# Patient Record
Sex: MALE | Race: WHITE | NOT HISPANIC OR LATINO | Employment: OTHER | URBAN - METROPOLITAN AREA
[De-identification: names, ages, dates, MRNs, and addresses within clinical notes are randomized per-mention and may not be internally consistent; named-entity substitution may affect disease eponyms.]

---

## 2017-01-03 ENCOUNTER — TELEPHONE (OUTPATIENT)
Dept: INTERNAL MEDICINE | Age: 67
End: 2017-01-03

## 2017-01-03 ENCOUNTER — ANTICOAGULATION VISIT (OUTPATIENT)
Dept: INTERNAL MEDICINE | Age: 67
End: 2017-01-03

## 2017-01-03 DIAGNOSIS — I80.202: Primary | ICD-10-CM

## 2017-01-03 LAB — INR PPP: 2.1 (ref 2–3)

## 2017-01-03 PROCEDURE — 36416 COLLJ CAPILLARY BLOOD SPEC: CPT | Performed by: INTERNAL MEDICINE

## 2017-01-03 PROCEDURE — 85610 PROTHROMBIN TIME: CPT | Performed by: INTERNAL MEDICINE

## 2017-01-03 NOTE — TELEPHONE ENCOUNTER
Patient called to let us know what his INR result was. States that he takes 5 mg daily and his INR was 2.1

## 2017-01-03 NOTE — TELEPHONE ENCOUNTER
----- Message from George Mooney MD sent at 1/3/2017  8:31 AM EST -----  Please call the patient regarding his result.  INR is therapeutic, continue present dosage of Coumadin and repeat INR in 4 weeks

## 2017-01-10 DIAGNOSIS — I80.209: ICD-10-CM

## 2017-01-10 RX ORDER — WARFARIN SODIUM 5 MG/1
TABLET ORAL
Qty: 90 TABLET | Refills: 0 | Status: SHIPPED | OUTPATIENT
Start: 2017-01-10 | End: 2017-04-17 | Stop reason: SDUPTHER

## 2017-01-16 ENCOUNTER — TELEPHONE (OUTPATIENT)
Dept: INTERNAL MEDICINE | Age: 67
End: 2017-01-16

## 2017-01-16 NOTE — TELEPHONE ENCOUNTER
----- Message from George Mooney MD sent at 1/16/2017  1:35 PM EST -----  Please call the patient regarding his  Result.  INR is therapeutic, continue present dosage of Coumadin, repeat INR 4 weeks.

## 2017-01-20 ENCOUNTER — TELEPHONE (OUTPATIENT)
Dept: INTERNAL MEDICINE | Age: 67
End: 2017-01-20

## 2017-01-20 NOTE — TELEPHONE ENCOUNTER
----- Message from George Mooney MD sent at 1/20/2017 11:35 AM EST -----  Please call the patient regarding his  Result.  INR is therapeutic, continue present dosage of Coumadin, repeat INR 4 weeks.

## 2017-01-26 ENCOUNTER — OFFICE VISIT (OUTPATIENT)
Dept: INTERNAL MEDICINE | Age: 67
End: 2017-01-26

## 2017-01-26 VITALS
TEMPERATURE: 97.7 F | HEIGHT: 77 IN | HEART RATE: 67 BPM | SYSTOLIC BLOOD PRESSURE: 120 MMHG | BODY MASS INDEX: 26.48 KG/M2 | OXYGEN SATURATION: 98 % | WEIGHT: 224.3 LBS | DIASTOLIC BLOOD PRESSURE: 62 MMHG

## 2017-01-26 DIAGNOSIS — Z00.00 MEDICARE ANNUAL WELLNESS VISIT, SUBSEQUENT: Primary | ICD-10-CM

## 2017-01-26 DIAGNOSIS — F32.9 REACTIVE DEPRESSION: ICD-10-CM

## 2017-01-26 DIAGNOSIS — M17.11 OSTEOARTHROSIS, LOCALIZED, PRIMARY, KNEE, RIGHT: ICD-10-CM

## 2017-01-26 DIAGNOSIS — F51.01 PRIMARY INSOMNIA: ICD-10-CM

## 2017-01-26 PROBLEM — I87.2 VENOUS STASIS DERMATITIS: Status: ACTIVE | Noted: 2017-01-26

## 2017-01-26 PROBLEM — N40.0 BENIGN PROSTATIC HYPERPLASIA: Status: ACTIVE | Noted: 2017-01-26

## 2017-01-26 LAB
CHOLEST SERPL-MCNC: 174 MG/DL (ref 0–200)
HDLC SERPL-MCNC: 67 MG/DL (ref 40–60)
LDLC SERPL CALC-MCNC: 98 MG/DL (ref 0–100)
TRIGL SERPL-MCNC: 47 MG/DL (ref 0–150)
VLDLC SERPL CALC-MCNC: 9.4 MG/DL (ref 5–40)

## 2017-01-26 PROCEDURE — G0439 PPPS, SUBSEQ VISIT: HCPCS | Performed by: INTERNAL MEDICINE

## 2017-01-26 PROCEDURE — 99214 OFFICE O/P EST MOD 30 MIN: CPT | Performed by: INTERNAL MEDICINE

## 2017-01-26 RX ORDER — PAROXETINE HYDROCHLORIDE 20 MG/1
20 TABLET, FILM COATED ORAL NIGHTLY
Qty: 30 TABLET | Refills: 3 | Status: SHIPPED | OUTPATIENT
Start: 2017-01-26 | End: 2017-05-30 | Stop reason: SDUPTHER

## 2017-01-26 NOTE — PATIENT INSTRUCTIONS
Medicare Wellness  Personal Prevention Plan of Service     Date of Office Visit:  2017  Encounter Provider:  George Mooney MD  Place of Service:  [facility] PRIMARY [facility]ARE  Patient Name: Jose Hurtado  :  1950    As part of the Medicare Wellness portion of your visit today, we are providing you with this personalized preventive plan of services (PPPS). This plan is based upon recommendations of the [place] Preventive Services Task Force (USPSTF) and the Advisory Committee on Immunization Practices (ACIP).    This lists the preventive care services that should be considered, and provides dates of when you are due. Items listed as completed are up-to-date and do not require any further intervention.    Health Maintenance   Topic Date Due   • PNEUMOCOCCAL VACCINES (65+ LOW/MEDIUM RISK) (2 of 2 - PPSV23) 08/15/2017   • MEDICARE ANNUAL WELLNESS  2018   • TDAP/TD VACCINES (2 - Td) 2018   • COLONOSCOPY  12/15/2024   • HEPATITIS C SCREENING  Addressed   • INFLUENZA VACCINE  Completed   • AAA SCREEN (ONE-TIME)  Completed   • ZOSTER VACCINE  Completed         Patient Self-Management and Personalized Health Advice  The patient has been provided with information about: diet, exercise and weight management and preventive services including:   · Advanced directives: has an advanced directive - a copy has been provided and is in file, Diabetes screening, see lab orders.    Visit Diagnoses:    ICD-10-CM ICD-9-CM   1. Medicare annual wellness visit, subsequent Z00.00 V70.0   2. Osteoarthrosis, localized, primary, knee, right M17.11 715.16   3. Primary insomnia F51.01 307.42   4. Reactive depression F32.9 300.4       Orders Placed This Encounter   Procedures   • Lipid Panel       Outpatient Encounter Prescriptions as of 2017   Medication Sig Dispense Refill   • Multiple Vitamin (MULTI-VITAMIN PO) Take 1 tablet by mouth daily.     • omeprazole (PriLOSEC) 20 MG capsule Take 2  capsules by mouth 2 (two) times a day.     • oxybutynin (DITROPAN) 5 MG tablet 5 mg 2 (two) times a day.     • pramipexole (MIRAPEX) 1.5 MG tablet TAKE 1 TABLET TWICE DAILY (Patient taking differently: TAKE 1 TABLET DAILY) 180 tablet 3   • warfarin (COUMADIN) 5 MG tablet TAKE 1 TABLET EVERY DAY 90 tablet 0   • [DISCONTINUED] FLUZONE HIGH-DOSE 0.5 ML suspension prefilled syringe injection      • [DISCONTINUED] Iron tablet Take 25 mg by mouth daily.       No facility-administered encounter medications on file as of 1/26/2017.        Reviewed use of high risk medication in the elderly: yes  Reviewed for potential of harmful drug interactions in the elderly: yes    Follow Up:  1 year    An After Visit Summary and PPPS with all of these plans were given to the patient.

## 2017-01-26 NOTE — PROGRESS NOTES
QUICK REFERENCE INFORMATION:  The ABCs of the Annual Wellness Visit    Subsequent Medicare Wellness Visit    HEALTH RISK ASSESSMENT    Recent Hospitalizations:  Recently treated at the following:  [facility].    CMP, CBC both done August 30, 2016    Current Medical Providers:  Patient Care Team:  George Mooney MD as PCP - General  George Phelan II, MD as Consulting Physician (Hematology and Oncology)  Jose Inman MD as Consulting Physician (Urology)  Mulugeta BLACKWELL MD as Consulting Physician (Gastroenterology)  Seth Solomon MD as Consulting Physician (Cardiology)        Smoking Status:  History   Smoking Status   • Former Smoker   • Packs/day: 2.00   • Years: 2.00   • Types: Cigarettes   • Quit date: 1974   Smokeless Tobacco   • Never Used       Alcohol Consumption:  History   Alcohol Use   • 1.8 oz/week   • 1 Glasses of wine, 1 Cans of beer, 1 Shots of liquor per week     Comment: occassionally       Depression Screen:   PHQ-9 Depression Screening 1/26/2017   Little interest or pleasure in doing things 0   Feeling down, depressed, or hopeless 1   Trouble falling or staying asleep, or sleeping too much 2   Feeling tired or having little energy 1   Poor appetite or overeating 0   Feeling bad about yourself - or that you are a failure or have let yourself or your family down 0   Trouble concentrating on things, such as reading the newspaper or watching television 0   Moving or speaking so slowly that other people could have noticed. Or the opposite - being so fidgety or restless that you have been moving around a lot more than usual 0   Thoughts that you would be better off dead, or of hurting yourself in some way 0   PHQ-9 Total Score 4   If you checked off any problems, how difficult have these problems made it for you to do your work, take care of things at home, or get along with other people? Somewhat difficult       Health Habits and Functional and Cognitive  Screening:  Functional & Cognitive Status 1/26/2017   Do you have difficulty moving around from place to place? Yes   In the past year have you fallen or experienced a near fall? No   Do you need help using the phone?  No   Are you deaf or do you have serious difficulty hearing?  Yes   Do you need help with transportation? No   Do you need help shopping? No   Do you need help preparing meals?  No   Do you need help with housework?  No   Do you need help with laundry? No   Do you need help taking your medications? No   Do you need help managing money? No   Do you have difficulty concentrating, remembering or making decisions? No    osteoarthritis of the knee present, bilateral hearing aids in use.           Does the patient have evidence of cognitive impairment? No    Asprin use counseling:Patient currently on lifelong anticoagulation with Coumadin    Finger Rub Hearing Test (right ear):passed  Finger Rub Hearing Test (left ear):passed    Recent Lab Results:  CMP:  Lab Results   Component Value Date    BUN 22 08/30/2016    CREATININE 1.20 10/13/2016    EGFRIFNONA 69 08/30/2016    EGFRIFAFRI  08/30/2016      Comment:      <15 Indicative of kidney failure.    BCR 20.8 08/30/2016     08/30/2016    K 4.5 08/30/2016    CO2 23.8 08/30/2016    CALCIUM 9.3 08/30/2016    ALBUMIN 4.30 08/30/2016    LABIL2 1.2 08/30/2016    BILITOT 0.5 08/30/2016    ALKPHOS 108 08/30/2016    AST 25 08/30/2016    ALT 26 08/30/2016     Lipid Panel:     LDL:     HbA1c:     Urine Microalbumin:     Visual Acuity:   Visual Acuity Screening    Right eye Left eye Both eyes   Without correction: 6 7 7   With correction:          Age-appropriate Screening Schedule:  Refer to the list below for future screening recommendations based on patient's age, sex and/or medical conditions. Orders for these recommended tests are listed in the plan section. The patient has been provided with a written plan.    Health Maintenance   Topic Date Due   •  PNEUMOCOCCAL VACCINES (65+ LOW/MEDIUM RISK) (2 of 2 - PPSV23) 08/15/2017   • TDAP/TD VACCINES (2 - Td) 02/01/2018   • COLONOSCOPY  12/15/2024   • INFLUENZA VACCINE  Completed   • ZOSTER VACCINE  Completed        Subjective   History of Present Illness    Jose Hurtado is a 66 y.o. male who presents for an Subsequent Wellness Visit. In addition, we addressed the following health issues: Osteoarthritis right knee, insomnia, depression    The following portions of the patient's history were reviewed and updated as appropriate: allergies, current medications, past family history, past medical history, past social history, past surgical history and problem list.    Outpatient Medications Prior to Visit   Medication Sig Dispense Refill   • Multiple Vitamin (MULTI-VITAMIN PO) Take 1 tablet by mouth daily.     • omeprazole (PriLOSEC) 20 MG capsule Take 2 capsules by mouth 2 (two) times a day.     • oxybutynin (DITROPAN) 5 MG tablet 5 mg 2 (two) times a day.     • pramipexole (MIRAPEX) 1.5 MG tablet TAKE 1 TABLET TWICE DAILY (Patient taking differently: TAKE 1 TABLET DAILY) 180 tablet 3   • warfarin (COUMADIN) 5 MG tablet TAKE 1 TABLET EVERY DAY 90 tablet 0   • FLUZONE HIGH-DOSE 0.5 ML suspension prefilled syringe injection      • Iron tablet Take 25 mg by mouth daily.       No facility-administered medications prior to visit.        Patient Active Problem List   Diagnosis   • Adenocarcinoma of esophagus   • Adenomatous polyp of colon   • Anemia   • Duodenal ulcer   • Insomnia   • Malignant neoplasm of prostate   • Restless legs syndrome   • Thrombophlebitis of deep veins of lower extremity   • Ventral hernia without obstruction or gangrene   • Incisional hernia, without obstruction or gangrene   • Hernia, incisional   • Osteoarthrosis, localized, primary, knee   • Benign prostatic hyperplasia   • Venous stasis dermatitis   • Medicare annual wellness visit, subsequent   • Reactive depression       Identification of Risk  Factors:  Risk factors include: weight , inactivity and depression.    Review of Systems   Constitutional: Negative.    HENT: Positive for hearing loss.         Patient uses bilateral hearing aids   Eyes: Negative.    Respiratory: Negative.    Cardiovascular: Negative.    Gastrointestinal: Negative.    Endocrine: Negative.    Genitourinary: Negative.    Musculoskeletal:        Patient has significant arthritis in the right knee which precludes adequate exercise at this time.  We did talk about the utility of extra strength Tylenol for pain relief which should not interfere with his Coumadin therapy.   Allergic/Immunologic: Negative for immunocompromised state.   Neurological: Negative.    Hematological: Negative.    Psychiatric/Behavioral: Positive for dysphoric mood and sleep disturbance.        Patient and his wife are having marital issues at this time.  We are actively seeing a counselor, they have been  for 34 years.  Patient believes that they can work.  Issues out at this time, however the counselor that he is seeing believes that he is depressed.  He has not been exercising, has gained weight, is having difficulty sleeping but there is no evidence of psychomotor retardation.       Compared to one year ago, the patient feels his physical health is better.  Compared to one year ago, the patient feels his mental health is the same.    Objective     Physical Exam   Constitutional: He is oriented to person, place, and time. He appears well-developed. No distress.   O/W   HENT:   Head: Normocephalic and atraumatic.   Right Ear: Tympanic membrane, external ear and ear canal normal.   Left Ear: Tympanic membrane, external ear and ear canal normal.   Mouth/Throat: Uvula is midline, oropharynx is clear and moist and mucous membranes are normal.   Eyes: Conjunctivae and EOM are normal. Pupils are equal, round, and reactive to light.   Neck: Normal range of motion. Neck supple. No JVD present. Carotid bruit is  "not present. No thyromegaly present.   Cardiovascular: Normal rate, regular rhythm, normal heart sounds and intact distal pulses.  Exam reveals no gallop and no friction rub.    No murmur heard.  Pulmonary/Chest: Effort normal and breath sounds normal. He has no wheezes. He has no rales.   Abdominal: Soft. Bowel sounds are normal. There is no hepatosplenomegaly. There is no tenderness.   Genitourinary:   Genitourinary Comments: Deferred to urology   Musculoskeletal: Normal range of motion. He exhibits no edema or deformity.   Lymphadenopathy:     He has no cervical adenopathy.   Neurological: He is alert and oriented to person, place, and time. He has normal strength and normal reflexes. No cranial nerve deficit or sensory deficit. Coordination normal.   Skin: Skin is warm and dry. No rash noted.   Psychiatric: He has a normal mood and affect. His speech is normal and behavior is normal. Judgment and thought content normal. Cognition and memory are normal.   Nursing note and vitals reviewed.      Vitals:    01/26/17 0755   BP: 120/62   Pulse: 67   Temp: 97.7 °F (36.5 °C)   SpO2: 98%   Weight: 224 lb 4.8 oz (102 kg)   Height: 77\" (195.6 cm)   PainSc: 0-No pain       Body mass index is 26.6 kg/(m^2).  Discussed the patient's BMI with him. The BMI is above average; BMI management plan is completed.    Assessment/Plan   Patient Self-Management and Personalized Health Advice  The patient has been provided with information about: diet, exercise and weight management and preventive services including:   · Advanced directives: has an advanced directive - a copy has been provided and is in file, Diabetes screening, see lab orders.    Visit Diagnoses:    ICD-10-CM ICD-9-CM   1. Medicare annual wellness visit, subsequent Z00.00 V70.0   2. Osteoarthrosis, localized, primary, knee, right M17.11 715.16   3. Primary insomnia F51.01 307.42   4. Reactive depression F32.9 300.4       Orders Placed This Encounter   Procedures   • " Lipid Panel       Outpatient Encounter Prescriptions as of 1/26/2017   Medication Sig Dispense Refill   • Multiple Vitamin (MULTI-VITAMIN PO) Take 1 tablet by mouth daily.     • omeprazole (PriLOSEC) 20 MG capsule Take 2 capsules by mouth 2 (two) times a day.     • oxybutynin (DITROPAN) 5 MG tablet 5 mg 2 (two) times a day.     • pramipexole (MIRAPEX) 1.5 MG tablet TAKE 1 TABLET TWICE DAILY (Patient taking differently: TAKE 1 TABLET DAILY) 180 tablet 3   • warfarin (COUMADIN) 5 MG tablet TAKE 1 TABLET EVERY DAY 90 tablet 0   • [DISCONTINUED] FLUZONE HIGH-DOSE 0.5 ML suspension prefilled syringe injection      • [DISCONTINUED] Iron tablet Take 25 mg by mouth daily.       No facility-administered encounter medications on file as of 1/26/2017.        Reviewed use of high risk medication in the elderly: yes  Reviewed for potential of harmful drug interactions in the elderly: yes    Follow Up:  1 year    An After Visit Summary and PPPS with all of these plans were given to the patient.

## 2017-01-26 NOTE — MR AVS SNAPSHOT
Jose Hurtado   1/26/2017 8:00 AM   Office Visit    Dept Phone:  [phone]   Encounter #:  [id number]    Provider:  George Mooney MD   Department:  [facility] PRIMARY [facility]ARE                Your Full Care Plan              Today's Medication Changes          These changes are accurate as of: 1/26/17  8:45 AM.  If you have any questions, ask your nurse or doctor.               New Medication(s)Ordered:     PARoxetine 20 MG tablet   Commonly known as:  PAXIL   Take 1 tablet by mouth Every Night.   Started by:  George Mooney MD         Stop taking medication(s)listed here:     FLUZONE HIGH-DOSE 0.5 ML suspension prefilled syringe injection   Generic drug:  influenza vac split high-dose   Stopped by:  George Mooney MD           Iron tablet   Stopped by:  George Mooney MD                Where to Get Your Medications      These medications were sent to [address] AT [facility] & ([facility] [phone] [facility] [phone] [place] [address]     Phone:  [phone]     PARoxetine 20 MG tablet                  Your Updated Medication List          This list is accurate as of: 1/26/17  8:45 AM.  Always use your most recent med list.                MULTI-VITAMIN PO       omeprazole 20 MG capsule   Commonly known as:  priLOSEC       oxybutynin 5 MG tablet   Commonly known as:  DITROPAN       PARoxetine 20 MG tablet   Commonly known as:  PAXIL   Take 1 tablet by mouth Every Night.       pramipexole 1.5 MG tablet   Commonly known as:  MIRAPEX   TAKE 1 TABLET TWICE DAILY       warfarin 5 MG tablet   Commonly known as:  COUMADIN   TAKE 1 TABLET EVERY DAY               We Performed the Following     Lipid Panel       You Were Diagnosed With        Codes Comments    Medicare annual wellness visit, subsequent    -  Primary ICD-10-CM: Z00.00  ICD-9-CM: V70.0     Osteoarthrosis, localized, primary, knee,  right     ICD-10-CM: M17.11  ICD-9-CM: 715.16     Primary insomnia     ICD-10-CM: F51.01  ICD-9-CM: 307.42     Reactive depression     ICD-10-CM: F32.9  ICD-9-CM: 300.4       Instructions      Medicare Wellness  Personal Prevention Plan of Service     Date of Office Visit:  2017  Encounter Provider:  George Mooney MD  Place of Service:  [facility] PRIMARY CARE  Patient Name: Jose Hurtado  :  1950    As part of the Medicare Wellness portion of your visit today, we are providing you with this personalized preventive plan of services (PPPS). This plan is based upon recommendations of the [place] Preventive Services Task Force (USPSTF) and the Advisory Committee on Immunization Practices (ACIP).    This lists the preventive care services that should be considered, and provides dates of when you are due. Items listed as completed are up-to-date and do not require any further intervention.    Health Maintenance   Topic Date Due   • PNEUMOCOCCAL VACCINES (65+ LOW/MEDIUM RISK) (2 of 2 - PPSV23) 08/15/2017   • MEDICARE ANNUAL WELLNESS  2018   • TDAP/TD VACCINES (2 - Td) 2018   • COLONOSCOPY  12/15/2024   • HEPATITIS C SCREENING  Addressed   • INFLUENZA VACCINE  Completed   • AAA SCREEN (ONE-TIME)  Completed   • ZOSTER VACCINE  Completed         Patient Self-Management and Personalized Health Advice  The patient has been provided with information about: diet, exercise and weight management and preventive services including:   · Advanced directives: has an advanced directive - a copy has been provided and is in file, Diabetes screening, see lab orders.    Visit Diagnoses:    ICD-10-CM ICD-9-CM   1. Medicare annual wellness visit, subsequent Z00.00 V70.0   2. Osteoarthrosis, localized, primary, knee, right M17.11 715.16   3. Primary insomnia F51.01 307.42   4. Reactive depression F32.9 300.4       Orders Placed This Encounter   Procedures   • Lipid Panel       Outpatient  Encounter Prescriptions as of 2017   Medication Sig Dispense Refill   • Multiple Vitamin (MULTI-VITAMIN PO) Take 1 tablet by mouth daily.     • omeprazole (PriLOSEC) 20 MG capsule Take 2 capsules by mouth 2 (two) times a day.     • oxybutynin (DITROPAN) 5 MG tablet 5 mg 2 (two) times a day.     • pramipexole (MIRAPEX) 1.5 MG tablet TAKE 1 TABLET TWICE DAILY (Patient taking differently: TAKE 1 TABLET DAILY) 180 tablet 3   • warfarin (COUMADIN) 5 MG tablet TAKE 1 TABLET EVERY DAY 90 tablet 0   • [DISCONTINUED] FLUZONE HIGH-DOSE 0.5 ML suspension prefilled syringe injection      • [DISCONTINUED] Iron tablet Take 25 mg by mouth daily.       No facility-administered encounter medications on file as of 2017.        Reviewed use of high risk medication in the elderly: yes  Reviewed for potential of harmful drug interactions in the elderly: yes    Follow Up:  1 year    An After Visit Summary and PPPS with all of these plans were given to the patient.              Patient Instructions History      Upcoming Appointments     Visit Type Date Time Department    WELCOME TO MEDICARE 2017  8:00 AM [facility]    FOLLOW UP 2017  9:00 AM [facility]    CT [facility] ABD PELVIS W CONTRAST 4/3/2017  9:30 AM [facility] PET    FOLLOW UP 1 UNIT 4/10/2017  9:40 AM [facility] ONC CBC [facility]    VITALS ONLY 4/10/2017  9:20 AM [facility] ONC CBC LAB [facility]      [facility] Signup     [facility] [employer] allows you to send messages to your doctor, view your test results, renew your prescriptions, schedule appointments, and more. To sign up, go to [employer] and click on the Sign Up Now link in the New User? box. Enter your [employer] Activation Code exactly as it appears below along with the last four digits of your Social Security Number and your Date of Birth () to complete the sign-up process. If you do not sign up before the expiration date, you must request a new code.    [employer] Activation Code:  [email or web address]  Expires: 2/9/2017  8:44 AM    If you have questions, you can email [email or web address] or call [phone] to talk to our MyChart staff. Remember, [employer] is NOT to be used for urgent needs. For medical emergencies, dial 911.               Other Info from Your Visit           Your Appointments     Feb 23, 2017  9:00 AM EST   Follow Up with Renny Solis MD   [facility] (--)    [address]  [facility] [address]   [phone]           Arrive 15 minutes prior to appointment.            Apr 03, 2017  9:30 AM EDT   CT adrian abd pelvis w contrast with ADRIAN PET CT   [facility] ([place])    [address]  [facility] [id number] [phone]           Patient may only have liquids 4 hrs prior to exam. Please arrive 1 hour prior to exam to drink barium contrast.            Apr 10, 2017  9:20 AM EDT   Vitals Only with VITALS ONLY CBC [facility] ONCOLOGY CBC LAB ([place])    [address]  [facility] [address] [phone]            Apr 10, 2017  9:40 AM EDT   FOLLOW UP with George Phelan II, MD   [facility] BLOOD [facility]ORDERS [facility]CER ([facility] [place])    [address]  [facility] [address] [phone]            Jan 30, 2018  8:00 AM EST   Subsequent Medicare Wellness with George Mooney MD   [facility] (--)    [address]  [facility] [address] [phone]              Allergies     No Known Allergies      Reason for Visit     Annual Exam AWV      Vital Signs     Blood Pressure Pulse Temperature Height Weight Oxygen Saturation    120/62 67 97.7 °F (36.5 °C) 77\" (195.6 cm) 224 lb 4.8 oz (102 kg) 98%    Body Mass Index Smoking Status                26.6 kg/m2 Former Smoker          Problems and Diagnoses Noted     Benign enlargement of prostate    Difficulty " falling or staying asleep    Medicare annual wellness visit, subsequent    Osteoarthritis (arthritis due to wear and tear of joints)    Reactive depression    Stasis dermatitis      No Longer an Issue     Cough    Abnormal weight loss    Difficulty swallowing    History of deep vein thrombosis    Hyperkalemia

## 2017-01-26 NOTE — PROGRESS NOTES
Subjective   Jose Hurtado is a 66 y.o. male.     History of Present Illness \    Osteoarthritis right knee, patient is currently seeing orthopedics had an injection in October of last year did discuss the possibility of him using extra strength Tylenol on a regular basis to provide him with some pain relief.  We also discussed swimming as possible exercise to keep stress off the knee.    Insomnia and depression: See review of systems in the annual [facility] visit portion of the exam for details.  Patient has not been sleeping over the past several years.  He has no difficulty falling asleep, but has difficulty staying asleep.  He gets adequate sleep during the course of the day with several naps during the daylight hours.    The following portions of the patient's history were reviewed and updated as appropriate: allergies, current medications, past family history, past medical history, past social history, past surgical history and problem list.    Review of Systems   Respiratory: Negative for chest tightness and shortness of breath.    Cardiovascular: Negative for chest pain and palpitations.   Neurological: Negative for dizziness, syncope, speech difficulty, weakness, light-headedness and headaches.       Objective   Physical Exam   Constitutional: He is oriented to person, place, and time. He appears well-developed and well-nourished. No distress.   Cardiovascular: Normal rate, regular rhythm, normal heart sounds and intact distal pulses.  Exam reveals no gallop and no friction rub.    No murmur heard.  Pulmonary/Chest: Effort normal and breath sounds normal. He has no wheezes. He has no rales.   Musculoskeletal: He exhibits no edema.   Neurological: He is alert and oriented to person, place, and time.   Skin: Skin is warm and dry. No rash noted.   Psychiatric: He has a normal mood and affect. His behavior is normal. Judgment and thought content normal.   Nursing note and vitals reviewed.      Assessment/Plan    Jose was seen today for annual exam.    Diagnoses and all orders for this visit:    Medicare annual wellness visit, subsequent  -     Lipid Panel    Osteoarthrosis, localized, primary, knee, right    Primary insomnia  -     PARoxetine (PAXIL) 20 MG tablet; Take 1 tablet by mouth Every Night.    Reactive depression  -     PARoxetine (PAXIL) 20 MG tablet; Take 1 tablet by mouth Every Night.        #1 subsequent annual Medicare wellness evaluation, currently stable.  Lab as above, follow-up results by mail.  Recommend swimming as a form of exercise.  Patient is only about 10 pounds above his ideal body weight, but weight loss would help his knee.    #2 osteoarthritis, recommend extra Tylenol as needed, weight loss.    #3 insomnia and #4  Depression would suggest treatment with Paxil 20 mg at bedtime this should help with both issues.  I suspect that if patient is able to sleep throughout the course of the night, he will not need as many naps during the day, which are free of time for exercise, and perhaps lessen the stress on his marital life.  She'll follow-up with me by phone in 30 days

## 2017-02-03 ENCOUNTER — TELEPHONE (OUTPATIENT)
Dept: INTERNAL MEDICINE | Age: 67
End: 2017-02-03

## 2017-02-03 NOTE — TELEPHONE ENCOUNTER
----- Message from George Mooney MD sent at 2/3/2017  1:39 PM EST -----  Please call the patient regarding his  result.  INR is therapeutic, continue present dosage of Coumadin, repeat INR in 4 weeks.

## 2017-02-03 NOTE — TELEPHONE ENCOUNTER
Called pt regarding PT/INR results. Pt was instructed to continue current dose of coumadin. Pt was instructed to recheck PT/INR in 4 weeks. Pt demonstrated understanding.    KD

## 2017-02-13 ENCOUNTER — TELEPHONE (OUTPATIENT)
Dept: INTERNAL MEDICINE | Age: 67
End: 2017-02-13

## 2017-02-14 ENCOUNTER — TELEPHONE (OUTPATIENT)
Dept: INTERNAL MEDICINE | Age: 67
End: 2017-02-14

## 2017-02-14 NOTE — TELEPHONE ENCOUNTER
----- Message from George Mooney MD sent at 2/13/2017  6:41 PM EST -----  INR is therapeutic, continue present dosage of Coumadin, and repeat INR in 4 weeks.

## 2017-02-14 NOTE — TELEPHONE ENCOUNTER
Patient will need bridging therapy because of prior history. I will handle this, letter with instructions will be forthcoming.(Date of surgery March 2, 2017).

## 2017-02-14 NOTE — TELEPHONE ENCOUNTER
Left VM for pt regarding PT/INR results. Pt was instructed to continue current dose. Pt was instructed to recheck in 4 weeks.    KD

## 2017-02-23 ENCOUNTER — OFFICE VISIT (OUTPATIENT)
Dept: ORTHOPEDIC SURGERY | Facility: CLINIC | Age: 67
End: 2017-02-23

## 2017-02-23 VITALS — HEIGHT: 77 IN | WEIGHT: 215 LBS | BODY MASS INDEX: 25.39 KG/M2 | TEMPERATURE: 98.4 F

## 2017-02-23 DIAGNOSIS — M17.11 PRIMARY OSTEOARTHRITIS OF RIGHT KNEE: Primary | ICD-10-CM

## 2017-02-23 PROCEDURE — 99213 OFFICE O/P EST LOW 20 MIN: CPT | Performed by: ORTHOPAEDIC SURGERY

## 2017-02-23 NOTE — PROGRESS NOTES
"Patient: Jose Hurtado  YOB: 1950 67 y.o. male  Medical Record Number: [id number]    Chief Complaints:   Chief Complaint   Patient presents with   • Right Knee - Follow-up       History of Present Illness:Jose Hurtado is a 67 y.o. male who presents for follow-up of  right greater than left knee pain which is moderate in nature.  He has known advanced osteoarthritis.  He is doing physical therapy exercises.  He knows he needs a knee replacement but is not quite ready to proceed.    Allergies: No Known Allergies    Medications:   Current Outpatient Prescriptions   Medication Sig Dispense Refill   • enoxaparin (LOVENOX) 30 MG/0.3ML solution syringe Inject 0.3 mL under the skin Every 12 (Twelve) Hours. 8 syringe 0   • Multiple Vitamin (MULTI-VITAMIN PO) Take 1 tablet by mouth daily.     • omeprazole (PriLOSEC) 20 MG capsule Take 2 capsules by mouth 2 (two) times a day.     • oxybutynin (DITROPAN) 5 MG tablet 5 mg 2 (two) times a day.     • PARoxetine (PAXIL) 20 MG tablet Take 1 tablet by mouth Every Night. 30 tablet 3   • pramipexole (MIRAPEX) 1.5 MG tablet TAKE 1 TABLET TWICE DAILY (Patient taking differently: TAKE 1 TABLET DAILY) 180 tablet 3   • warfarin (COUMADIN) 5 MG tablet TAKE 1 TABLET EVERY DAY 90 tablet 0     No current facility-administered medications for this visit.          The following portions of the patient's history were reviewed and updated as appropriate: allergies, current medications, past family history, past medical history, past social history, past surgical history and problem list.    Review of Systems:   A 14 point review of systems was performed. All systems negative except pertinent positives/negative listed in HPI above    Physical Exam:   Vitals:    02/23/17 0916   Temp: 98.4 °F (36.9 °C)   Weight: 215 lb (97.5 kg)   Height: 77\" (195.6 cm)       General: A and O x 3, ASA, NAD    SCLERA:    Normal    DENTITION:   Normal  Knee:  right    ALIGNMENT:     Valgus      GAIT:    " Antalgic    SKIN:    No abnormality    RANGE OF MOTION:   7  -  120   DEG    STRENGTH:   4  / 5    LIGAMENTS:    No varus / valgus instability.   Negative  Lachman.    MENISCUS:     Negative   Jackie       DISTAL PULSES:    Paplable    DISTAL SENSATION :   Intact    LYMPHATICS:     No   lymphadenopathy    OTHER:          - Positive   effusion      - Crepitance with ROM     Radiology:  Xrays 3views right knee (ap,lateral, sunrise) taken previously demonstratingadvanced valgus osteoarthritis with bone on bone articulation, subchondral cysts, and periarticular osteophytes    Assessment/Plan:  Advanced right greater than left knee osteoarthritis.  He will continue his therapy exercises.  I will see him in 6 months.  He is nearing the point of knee replacement but does not mentally ready given his recent health issues cancer and blood clots with pulmonary embolism.

## 2017-02-27 ENCOUNTER — OFFICE VISIT (OUTPATIENT)
Dept: INTERNAL MEDICINE | Age: 67
End: 2017-02-27

## 2017-02-27 ENCOUNTER — TELEPHONE (OUTPATIENT)
Dept: INTERNAL MEDICINE | Age: 67
End: 2017-02-27

## 2017-02-27 VITALS
OXYGEN SATURATION: 98 % | BODY MASS INDEX: 26.92 KG/M2 | HEIGHT: 77 IN | WEIGHT: 228 LBS | TEMPERATURE: 98.2 F | DIASTOLIC BLOOD PRESSURE: 72 MMHG | HEART RATE: 74 BPM | SYSTOLIC BLOOD PRESSURE: 138 MMHG

## 2017-02-27 DIAGNOSIS — R20.2 PARESTHESIA: Primary | ICD-10-CM

## 2017-02-27 PROCEDURE — 99213 OFFICE O/P EST LOW 20 MIN: CPT | Performed by: NURSE PRACTITIONER

## 2017-02-27 NOTE — PROGRESS NOTES
"Jose Hurtado / 67 y.o. / male  02/27/2017      CC:  Main reason(s) for today's visit: left ear numbness (x 2 days feels like it goes asleeps comes back if he rubs it)      HPI:   Patient presents to the office with C/O numbness in the left ear. States that this began about two days ago. He reports that it feels as if his ear is asleep and when he rubs the ear, the numbness is resolved. Denies any SOA, H/A, C/P, chest pressure. Denies any numbness or tingling in either arm. Also denies any speech or visual disturbances. Patient states that he has loss of hearing but states that this has been a chronic issue. No other complaints.     The following portions of the patient's history were reviewed and updated as appropriate: past medical history and past surgical history.    ASSESSMENT & PLAN:    Problem List Items Addressed This Visit     None      Visit Diagnoses     Paresthesia    -  Primary        No orders of the defined types were placed in this encounter.      · Summary/Discussion:     1. Paresthesia of the right ear: Patient presented to the office with complaints of numbness in the left outer ear.  Patient states this began to days ago.  Patient states that it feels as if he has \"lied down on it\" and the ear feels like it has \"fallen asleep\".  Patient reports that once he is awake and rubs the area, then the numbness goes away.  Upon physical examination, left ear and canal appeared normal.  No bony abnormalities noted.  Carotid pulses were palpable.  No jugular vein distention.  No lymphadenopathy, no redness, heat or swelling around the ear or structure surrounding the ear.  No other abnormalities noted.  Patient was advised to continue to monitor the symptoms and if symptoms worsen to contact the office for further evaluation.  Follow-up: No Follow-up on file.     Future Appointments  Date Time Provider Department Center   4/3/2017 9:30 AM TONIE PET CT  [facility]   4/10/2017 9:20 AM VITALS ONLY [facility]  " LAB [facility]   4/10/2017 9:40 AM George Phelan II, MD [facility]  [facility]   8/24/2017 9:00 AM Renny Solis MD [facility] None   1/30/2018 8:00 AM George Mooney MD [facility] None     ____________________________________________________________________    REVIEW OF SYSTEMS    Review of Systems   Constitutional: Negative for activity change, appetite change, chills, diaphoresis, fatigue and fever.   HENT: Negative for congestion, dental problem, ear discharge, facial swelling, hearing loss, mouth sores, sore throat, trouble swallowing and voice change.         Numbness in the outer portion of the left ear.    Eyes: Negative.    Respiratory: Negative for apnea, cough, choking, chest tightness, shortness of breath, wheezing and stridor.    Cardiovascular: Negative for chest pain, palpitations and leg swelling.   Gastrointestinal: Negative for abdominal distention, abdominal pain, anal bleeding, blood in stool, constipation, diarrhea, nausea, rectal pain and vomiting.   Endocrine: Negative for cold intolerance and heat intolerance.   Genitourinary: Negative for decreased urine volume, difficulty urinating, dysuria, enuresis, flank pain, frequency, genital sores, hematuria and urgency.   Musculoskeletal: Negative for arthralgias, back pain, gait problem, joint swelling, myalgias, neck pain and neck stiffness.   Skin: Negative for color change, pallor, rash and wound.   Allergic/Immunologic: Negative for environmental allergies, food allergies and immunocompromised state.   Neurological: Negative for dizziness, tremors, seizures, syncope, facial asymmetry, speech difficulty, weakness, light-headedness, numbness and headaches.   Hematological: Negative for adenopathy. Does not bruise/bleed easily.   Psychiatric/Behavioral: Negative for agitation, confusion, decreased concentration, self-injury, sleep disturbance and suicidal ideas. The patient is not nervous/anxious and is not hyperactive.    All other systems  "reviewed and are negative.    Other: As noted per HPI      VITALS    Visit Vitals   • /72   • Pulse 74   • Temp 98.2 °F (36.8 °C)   • Ht 77\" (195.6 cm)   • Wt 228 lb (103 kg)   • SpO2 98%   • BMI 27.04 kg/m2     BP Readings from Last 3 Encounters:   02/27/17 138/72   01/26/17 120/62   10/20/16 110/60     Wt Readings from Last 3 Encounters:   02/27/17 228 lb (103 kg)   02/23/17 215 lb (97.5 kg)   01/26/17 224 lb 4.8 oz (102 kg)      Body mass index is 27.04 kg/(m^2).    PHYSICAL EXAMINATION    Physical Exam   Constitutional: He is oriented to person, place, and time. He appears well-developed and well-nourished.   HENT:   Head: Normocephalic.   Right Ear: External ear normal.   Left Ear: External ear normal.   Nose: Nose normal.   Mouth/Throat: Oropharynx is clear and moist.   Eyes: Conjunctivae are normal. Pupils are equal, round, and reactive to light.   Neck: Normal range of motion.   Cardiovascular: Normal rate, regular rhythm, S1 normal, S2 normal, normal heart sounds, intact distal pulses and normal pulses.    Pulses:       Carotid pulses are 2+ on the right side, and 2+ on the left side.  Pulmonary/Chest: Effort normal and breath sounds normal.   Musculoskeletal: Normal range of motion.   Neurological: He is alert and oriented to person, place, and time.   Skin: Skin is warm and dry.   Psychiatric: He has a normal mood and affect. His behavior is normal.   Vitals reviewed.        REVIEWED DATA:    Labs:   Lab Results   Component Value Date     08/30/2016    K 4.5 08/30/2016    AST 25 08/30/2016    ALT 26 08/30/2016    BUN 22 08/30/2016    CREATININE 1.20 10/13/2016    CREATININE 1.06 08/30/2016    CREATININE 1.11 07/16/2016    EGFRIFNONA 69 08/30/2016    EGFRIFAFRI  08/30/2016      Comment:      <15 Indicative of kidney failure.       No results found for: GLU, HGBA1C    Lab Results   Component Value Date    LDL 98 01/26/2017    HDL 67 (H) 01/26/2017    TRIG 47 01/26/2017       Lab Results "   Component Value Date    TSH 3.29 04/28/2015          Lab Results   Component Value Date    WBC 4.16 (L) 08/30/2016    HGB 12.9 (L) 08/30/2016     08/30/2016        Imaging:        Medical Tests:        Summary of old records / correspondence / consultant report:        Request outside records:          ALLERGIES:    Review of patient's allergies indicates no known allergies.    MEDICATIONS:  Current Outpatient Prescriptions   Medication Sig Dispense Refill   • enoxaparin (LOVENOX) 30 MG/0.3ML solution syringe Inject 0.3 mL under the skin Every 12 (Twelve) Hours. 8 syringe 0   • Multiple Vitamin (MULTI-VITAMIN PO) Take 1 tablet by mouth daily.     • omeprazole (PriLOSEC) 20 MG capsule Take 2 capsules by mouth 2 (two) times a day.     • oxybutynin (DITROPAN) 5 MG tablet 5 mg 2 (two) times a day.     • PARoxetine (PAXIL) 20 MG tablet Take 1 tablet by mouth Every Night. 30 tablet 3   • pramipexole (MIRAPEX) 1.5 MG tablet TAKE 1 TABLET TWICE DAILY (Patient taking differently: TAKE 1 TABLET DAILY) 180 tablet 3   • warfarin (COUMADIN) 5 MG tablet TAKE 1 TABLET EVERY DAY 90 tablet 0     No current facility-administered medications for this visit.        [facility]    The following portions of the patient's history were reviewed and updated as appropriate: Allergies / Current Medications / Past Medical History / Surgical History / Social History / Family History    PROBLEM LIST:    Patient Active Problem List   Diagnosis   • Adenocarcinoma of esophagus   • Adenomatous polyp of colon   • Anemia   • Duodenal ulcer   • Insomnia   • Malignant neoplasm of prostate   • Restless legs syndrome   • Thrombophlebitis of deep veins of lower extremity   • Ventral hernia without obstruction or gangrene   • Incisional hernia, without obstruction or gangrene   • Hernia, incisional   • Osteoarthrosis, localized, primary, knee   • Benign prostatic hyperplasia   • Venous stasis dermatitis   • Medicare annual wellness visit, subsequent   •  Reactive depression       PAST MEDICAL HX:    Past Medical History   Diagnosis Date   • Anti-phospholipid syndrome      On lifelong anticoagulation therapy   • Atrial fibrillation      HAD IT FOR 3 HR AFTER SX ON ESOPHAGUS  AND CONVERTED BACK ON OWN   • Benign prostatic hypertrophy    • Bladder disorder      LEAKAGE   ON MED   • Clotting disorder    • Community acquired pneumonia    • Cough      Seeing pulmonary medicine February 2016   • Deep venous thrombosis 2008 2006   • Esophageal carcinoma 12/31/2014     had chemo and radiation prior surgery   • Esophageal reflux    • Fatigue    • GERD (gastroesophageal reflux disease)    • Hemorrhoids    • Hypertension    • Long-term (current) use of anticoagulants, INR goal 2.0-3.0    • Malignant neoplasm of prostate    • Pneumonia    • Radiation    • Restless legs syndrome    • 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   • Upper gastrointestinal endoscopy       Diagnostic   • Esophagectomy       April 2015, stage IIB esophageal carcinoma, sub-total resection.   • Esophagectomy       Esophagectomy Subtotal Andrea Joe Procedure   • Hammer toe repair  09/2014     Hammertoe Operation (Each Toe), 10/2014   • Hernia repair     • Jejunostomy       Laparoscopic   • Prostate surgery     • Pyloroplasty     • Tonsillectomy     • Upper gastrointestinal endoscopy       (Therapeutic)   • Appendectomy  1950   • Prostatectomy  2010   • Prostatectomy  2010   • Excision lesion  08/2012     Removal of Squamous Cell CA on Head   • Ventral/incisional hernia repair N/A 4/14/2016     Procedure: VENTRAL/INCISIONAL HERNIA REPAIR, open, with mesh, and component separation;  Surgeon: Darren Rivas MD;  Location: [facility]  MAIN OR;  Service:    • Spinal fusion  02/1998     C6-C7       SOCIAL HX:    Social History     Social History   • Marital status:      Spouse name: Lena   • Number of children: 0   • Years of education: College     Occupational History   •  Self-Employed     Social History Main Topics   • Smoking status: Former Smoker     Packs/day: 2.00     Years: 2.00     Types: Cigarettes     Quit date: 1974   • Smokeless tobacco: Never Used   • Alcohol use 1.8 oz/week     1 Glasses of wine, 1 Cans of beer, 1 Shots of liquor per week      Comment: occassionally   • Drug use: No   • Sexual activity: Not Asked     Other Topics Concern   • None     Social History Narrative    self-employed        FAMILY HX:    Family History   Problem Relation Age of Onset   • Cerebral aneurysm Mother      cerebral artery aneurysm   • Prostate cancer Brother 68

## 2017-02-27 NOTE — TELEPHONE ENCOUNTER
----- Message from George Mooney MD sent at 2/27/2017 12:15 PM EST -----  INR is therapeutic, continue present dosage of Coumadin, and repeat INR in 4 weeks.

## 2017-03-03 ENCOUNTER — TELEPHONE (OUTPATIENT)
Dept: INTERNAL MEDICINE | Age: 67
End: 2017-03-03

## 2017-03-03 NOTE — TELEPHONE ENCOUNTER
Left VM for pt regarding recent PT/INR results. Pt was instructed per Dr. Pandya to continue current dose of coumadin, 5mg QD. Pt was instructed per Dr. Pandya to recheck INR in 3 weeks. Pt was instructed to call office with any questions.    CHELITA

## 2017-03-06 ENCOUNTER — TELEPHONE (OUTPATIENT)
Dept: INTERNAL MEDICINE | Age: 67
End: 2017-03-06

## 2017-03-06 NOTE — TELEPHONE ENCOUNTER
Na,     Could you please call this patient and relay to him what Dr. Mooney has stated below?    KD

## 2017-03-06 NOTE — TELEPHONE ENCOUNTER
Pt states he did not receive letter regarding Lovenox dosing for upcoming surgery. Letter was sent according to chart 2/15/2017.     Date of surgery is different than stated in chart: 3/9/2017, not 3/2/2017.     Please advise dosing measures.    KD

## 2017-03-06 NOTE — TELEPHONE ENCOUNTER
He should've started his Lovenox on March 4 for 5 days finishing it on March 8.  He will need to contact his surgeon about readjusting timing of the surgery.  I am sorry that he did not receive the letter, but we did mail it on February 15.

## 2017-03-06 NOTE — TELEPHONE ENCOUNTER
Pt's new date of surgery for stone removal is 3/21/2017. He needs new instructions for Lovenox bridge.    Please advise.    KD

## 2017-03-15 ENCOUNTER — TRANSCRIBE ORDERS (OUTPATIENT)
Dept: LAB | Facility: HOSPITAL | Age: 67
End: 2017-03-15

## 2017-03-15 ENCOUNTER — HOSPITAL ENCOUNTER (OUTPATIENT)
Dept: CARDIOLOGY | Facility: HOSPITAL | Age: 67
Discharge: HOME | End: 2017-03-15
Admitting: ANESTHESIOLOGY

## 2017-03-15 DIAGNOSIS — Z01.818 PRE-OP TESTING: ICD-10-CM

## 2017-03-15 DIAGNOSIS — Z01.818 PRE-OP TESTING: Primary | ICD-10-CM

## 2017-03-15 PROCEDURE — 93010 ELECTROCARDIOGRAM REPORT: CPT | Performed by: INTERNAL MEDICINE

## 2017-03-15 PROCEDURE — 93005 ELECTROCARDIOGRAM TRACING: CPT | Performed by: ANESTHESIOLOGY

## 2017-04-03 ENCOUNTER — TELEPHONE (OUTPATIENT)
Dept: INTERNAL MEDICINE | Age: 67
End: 2017-04-03

## 2017-04-03 ENCOUNTER — HOSPITAL ENCOUNTER (OUTPATIENT)
Dept: PET IMAGING | Facility: HOSPITAL | Age: 67
Discharge: HOME | End: 2017-04-03
Attending: INTERNAL MEDICINE | Admitting: INTERNAL MEDICINE

## 2017-04-03 ENCOUNTER — LAB (OUTPATIENT)
Dept: LAB | Facility: HOSPITAL | Age: 67
End: 2017-04-03

## 2017-04-03 DIAGNOSIS — C15.9 ADENOCARCINOMA OF ESOPHAGUS (HCC): ICD-10-CM

## 2017-04-03 LAB
ALBUMIN SERPL-MCNC: 4.1 G/DL (ref 3.5–5.2)
ALBUMIN/GLOB SERPL: 1.5 G/DL (ref 1.1–2.4)
ALP SERPL-CCNC: 93 U/L (ref 38–116)
ALT SERPL W P-5'-P-CCNC: 27 U/L (ref 0–41)
ANION GAP SERPL CALCULATED.3IONS-SCNC: 13.9 MMOL/L
AST SERPL-CCNC: 33 U/L (ref 0–40)
BASOPHILS # BLD AUTO: 0.02 10*3/MM3 (ref 0–0.1)
BASOPHILS NFR BLD AUTO: 0.7 % (ref 0–1.1)
BILIRUB SERPL-MCNC: 0.4 MG/DL (ref 0.1–1.2)
BUN BLD-MCNC: 20 MG/DL (ref 6–20)
BUN/CREAT SERPL: 20.2 (ref 7.3–30)
CALCIUM SPEC-SCNC: 9 MG/DL (ref 8.5–10.2)
CHLORIDE SERPL-SCNC: 102 MMOL/L (ref 98–107)
CO2 SERPL-SCNC: 24.1 MMOL/L (ref 22–29)
CREAT BLD-MCNC: 0.99 MG/DL (ref 0.7–1.3)
CREAT BLDA-MCNC: 1 MG/DL (ref 0.6–1.3)
DEPRECATED RDW RBC AUTO: 50.8 FL (ref 37–49)
EOSINOPHIL # BLD AUTO: 0.18 10*3/MM3 (ref 0–0.36)
EOSINOPHIL NFR BLD AUTO: 6.6 % (ref 1–5)
ERYTHROCYTE [DISTWIDTH] IN BLOOD BY AUTOMATED COUNT: 14.5 % (ref 11.7–14.5)
GFR SERPL CREATININE-BSD FRML MDRD: 75 ML/MIN/1.73
GLOBULIN UR ELPH-MCNC: 2.8 GM/DL (ref 1.8–3.5)
GLUCOSE BLD-MCNC: 73 MG/DL (ref 74–124)
HCT VFR BLD AUTO: 35.8 % (ref 40–49)
HGB BLD-MCNC: 11.4 G/DL (ref 13.5–16.5)
IMM GRANULOCYTES # BLD: 0.01 10*3/MM3 (ref 0–0.03)
IMM GRANULOCYTES NFR BLD: 0.4 % (ref 0–0.5)
LYMPHOCYTES # BLD AUTO: 0.57 10*3/MM3 (ref 1–3.5)
LYMPHOCYTES NFR BLD AUTO: 20.8 % (ref 20–49)
MCH RBC QN AUTO: 30.9 PG (ref 27–33)
MCHC RBC AUTO-ENTMCNC: 31.8 G/DL (ref 32–35)
MCV RBC AUTO: 97 FL (ref 83–97)
MONOCYTES # BLD AUTO: 0.25 10*3/MM3 (ref 0.25–0.8)
MONOCYTES NFR BLD AUTO: 9.1 % (ref 4–12)
NEUTROPHILS # BLD AUTO: 1.71 10*3/MM3 (ref 1.5–7)
NEUTROPHILS NFR BLD AUTO: 62.4 % (ref 39–75)
NRBC BLD MANUAL-RTO: 0 /100 WBC (ref 0–0)
PLATELET # BLD AUTO: 124 10*3/MM3 (ref 150–375)
PMV BLD AUTO: 10.3 FL (ref 8.9–12.1)
POTASSIUM BLD-SCNC: 4.5 MMOL/L (ref 3.5–4.7)
PROT SERPL-MCNC: 6.9 G/DL (ref 6.3–8)
RBC # BLD AUTO: 3.69 10*6/MM3 (ref 4.3–5.5)
SODIUM BLD-SCNC: 140 MMOL/L (ref 134–145)
WBC NRBC COR # BLD: 2.74 10*3/MM3 (ref 4–10)

## 2017-04-03 PROCEDURE — 85025 COMPLETE CBC W/AUTO DIFF WBC: CPT

## 2017-04-03 PROCEDURE — 82565 ASSAY OF CREATININE: CPT

## 2017-04-03 PROCEDURE — 0 IOPAMIDOL 61 % SOLUTION: Performed by: INTERNAL MEDICINE

## 2017-04-03 PROCEDURE — 74177 CT ABD & PELVIS W/CONTRAST: CPT

## 2017-04-03 PROCEDURE — 80053 COMPREHEN METABOLIC PANEL: CPT

## 2017-04-03 PROCEDURE — 0 DIATRIZOATE MEGLUMINE & SODIUM PER 1 ML: Performed by: INTERNAL MEDICINE

## 2017-04-03 PROCEDURE — 36415 COLL VENOUS BLD VENIPUNCTURE: CPT

## 2017-04-03 PROCEDURE — 71260 CT THORAX DX C+: CPT

## 2017-04-03 RX ADMIN — IOPAMIDOL 85 ML: 612 INJECTION, SOLUTION INTRAVENOUS at 09:15

## 2017-04-03 RX ADMIN — DIATRIZOATE MEGLUMINE AND DIATRIZOATE SODIUM 30 ML: 660; 100 LIQUID ORAL; RECTAL at 08:15

## 2017-04-03 NOTE — TELEPHONE ENCOUNTER
Received PT/INR results via [employer].    INR 2.1 Current dose 5mg QD.    Left VM for pt regarding PT/INR results.     Per Dr. Pandya, pt is to continue current dose. Recheck in 1 week.    KD

## 2017-04-10 ENCOUNTER — OFFICE VISIT (OUTPATIENT)
Dept: ONCOLOGY | Facility: CLINIC | Age: 67
End: 2017-04-10

## 2017-04-10 ENCOUNTER — APPOINTMENT (OUTPATIENT)
Dept: LAB | Facility: HOSPITAL | Age: 67
End: 2017-04-10

## 2017-04-10 VITALS
DIASTOLIC BLOOD PRESSURE: 70 MMHG | SYSTOLIC BLOOD PRESSURE: 120 MMHG | RESPIRATION RATE: 16 BRPM | BODY MASS INDEX: 27.28 KG/M2 | HEIGHT: 76 IN | WEIGHT: 224 LBS | HEART RATE: 72 BPM | TEMPERATURE: 97.5 F

## 2017-04-10 DIAGNOSIS — C15.9 ADENOCARCINOMA OF ESOPHAGUS (HCC): Primary | ICD-10-CM

## 2017-04-10 PROCEDURE — G0463 HOSPITAL OUTPT CLINIC VISIT: HCPCS | Performed by: INTERNAL MEDICINE

## 2017-04-10 PROCEDURE — 99215 OFFICE O/P EST HI 40 MIN: CPT | Performed by: INTERNAL MEDICINE

## 2017-04-10 NOTE — PROGRESS NOTES
Subjective .     REASONS FOR FOLLOWUP:  Esophageal cancer    HISTORY OF PRESENT ILLNESS:  The patient is a 67 y.o. year old male  who is here for follow-up with the above-mentioned history.    Denies neurological symptoms.  Denies nausea   Denies weight loss.  Denies pain.    On average once every month or longer, he has emesis 5 hours or so after eating.  He states he received a card from Dr. Millan telling him it is time for another colonoscopy.  He plans to arrange this through Dr. Gonzalez's office.    No pain other than chronic arthritis.    States he is gaining weight.    Past Medical History:   Diagnosis Date   • Anti-phospholipid syndrome     On lifelong anticoagulation therapy   • Atrial fibrillation     HAD IT FOR 3 HR AFTER SX ON ESOPHAGUS  AND CONVERTED BACK ON OWN   • Benign prostatic hypertrophy    • Bladder disorder     LEAKAGE   ON MED   • Clotting disorder    • Community acquired pneumonia    • Cough     Seeing pulmonary medicine February 2016   • Deep venous thrombosis 2008 2006   • Esophageal carcinoma 12/31/2014    had chemo and radiation prior surgery   • Esophageal reflux    • Fatigue    • GERD (gastroesophageal reflux disease)    • Hemorrhoids    • Hypertension    • Long-term (current) use of anticoagulants, INR goal 2.0-3.0    • Malignant neoplasm of prostate    • Pneumonia    • Radiation    • Restless legs syndrome    • Squamous carcinoma     on the head   • Stasis dermatitis    • Weight loss      Past Surgical History:   Procedure Laterality Date   • APPENDECTOMY  1950   • BRONCHOSCOPY      (Diagnostic)   • COLONOSCOPY      Complete / Description: Hemorrhoids noted December 15, 2014, follow-up colonoscopy due in 5 years.   • ESOPHAGECTOMY      April 2015, stage IIB esophageal carcinoma, sub-total resection.   • ESOPHAGECTOMY      Esophagectomy Subtotal Andrea Joe Procedure   • EXCISION LESION  08/2012    Removal of Squamous Cell CA on Head   • HAMMER TOE REPAIR  09/2014    Elvia  Operation (Each Toe), 10/2014   • HERNIA REPAIR     • JEJUNOSTOMY      Laparoscopic   • PROSTATE SURGERY     • PROSTATECTOMY  2010   • PYLOROPLASTY     • SPINAL FUSION  02/1998    C6-C7   • TONSILLECTOMY     • UPPER GASTROINTESTINAL ENDOSCOPY      Diagnostic   • UPPER GASTROINTESTINAL ENDOSCOPY      (Therapeutic)   • VENTRAL/INCISIONAL HERNIA REPAIR N/A 4/14/2016    Procedure: VENTRAL/INCISIONAL HERNIA REPAIR, open, with mesh, and component separation;  Surgeon: Darren Rivas MD;  Location: [facility] OR;  Service:        HEMATOLOGIC/ONCOLOGIC HISTORY:  (History from previous dates can be found in the separate document.)    MEDICATIONS    Current Outpatient Prescriptions:   •  enoxaparin (LOVENOX) 30 MG/0.3ML solution syringe, Inject 0.3 mL under the skin Every 12 (Twelve) Hours., Disp: 8 syringe, Rfl: 0  •  Multiple Vitamin (MULTI-VITAMIN PO), Take 1 tablet by mouth daily., Disp: , Rfl:   •  omeprazole (PriLOSEC) 20 MG capsule, Take 2 capsules by mouth 2 (two) times a day., Disp: , Rfl:   •  oxybutynin (DITROPAN) 5 MG tablet, 5 mg 2 (two) times a day., Disp: , Rfl:   •  PARoxetine (PAXIL) 20 MG tablet, Take 1 tablet by mouth Every Night., Disp: 30 tablet, Rfl: 3  •  pramipexole (MIRAPEX) 1.5 MG tablet, TAKE 1 TABLET TWICE DAILY (Patient taking differently: TAKE 1 TABLET DAILY), Disp: 180 tablet, Rfl: 3  •  warfarin (COUMADIN) 5 MG tablet, TAKE 1 TABLET EVERY DAY, Disp: 90 tablet, Rfl: 0    ALLERGIES:   No Known Allergies    SOCIAL HISTORY:       Social History     Social History   • Marital status:      Spouse name: Lena   • Number of children: 0   • Years of education: College     Occupational History   •  Self-Employed     Social History Main Topics   • Smoking status: Former Smoker     Packs/day: 2.00     Years: 2.00     Types: Cigarettes     Quit date: 1974   • Smokeless tobacco: Never Used   • Alcohol use 1.8 oz/week     1 Glasses of wine, 1 Cans of beer, 1 Shots of liquor per  "week      Comment: occassionally   • Drug use: No   • Sexual activity: Not on file     Other Topics Concern   • Not on file     Social History Narrative    self-employed          FAMILY HISTORY:  Family History   Problem Relation Age of Onset   • Cerebral aneurysm Mother      cerebral artery aneurysm   • Prostate cancer Brother 68       REVIEW OF SYSTEMS:  GENERAL: No change in appetite or weight;   No fevers, chills, sweats.    SKIN: No nonhealing lesions.   No rashes.  HEME/LYMPH: No easy bruising, bleeding.   No swollen nodes.   EYES: No vision changes or diplopia.   ENT: No tinnitus, hearing loss, gum bleeding, epistaxis, hoarseness or dysphagia.   RESPIRATORY: No cough, shortness of breath, hemoptysis or wheezing.   CVS: No chest pain, palpitations, orthopnea, dyspnea on exertion or PND.   GI: see HPI   : No lower tract obstructive symptoms, dysuria or hematuria.   MUSCULOSKELETAL: see HPI   NEUROLOGICAL: No global weakness, loss of consciousness or seizures.   PSYCHIATRIC: No increased nervousness, mood changes or depression.       Objective    Vitals:    04/10/17 0937   BP: 120/70   Pulse: 72   Resp: 16   Temp: 97.5 °F (36.4 °C)   Weight: 224 lb (102 kg)   Height: 76.38\" (194 cm)  Comment: new ht.   PainSc: 0-No pain     Current Status 4/10/2017   ECOG score 0      PHYSICAL EXAM:    GENERAL:  Well-developed, well-nourished in no acute distress.   SKIN:  Warm, dry without rashes, purpura or petechiae.  HEAD:  Normocephalic.  EYES:  Pupils equal, round and reactive to light.  EOMs intact.  Conjunctivae normal.  EARS:  Hearing intact.  NOSE:  Septum midline.  No excoriations or nasal discharge.  MOUTH:  Tongue is well-papillated; no stomatitis or ulcers.  Lips normal.  THROAT:  Oropharynx without lesions or exudates.  NECK:  Supple with good range of motion; no thyromegaly or masses, no JVD.  LYMPHATICS:  No cervical, supraclavicular, axillary or inguinal adenopathy.  CHEST:  Lungs clear to percussion " and auscultation. Good airflow.  CARDIAC:  Regular rate and rhythm without murmurs, rubs or gallops. Normal S1,S2.  ABDOMEN:  Soft, nontender with no organomegaly or masses.  EXTREMITIES:  No clubbing, cyanosis or edema.  NEUROLOGICAL:  Cranial Nerves II-XII grossly intact.  No focal neurological deficits.  PSYCHIATRIC:  Normal affect and mood.      RECENT LABS:        WBC   Date/Time Value Ref Range Status   04/03/2017 09:00 AM 2.74 (L) 4.00 - 10.00 10*3/mm3 Final     Hemoglobin   Date/Time Value Ref Range Status   04/03/2017 09:00 AM 11.4 (L) 13.5 - 16.5 g/dL Final     Platelets   Date/Time Value Ref Range Status   04/03/2017 09:00  (L) 150 - 375 10*3/mm3 Final       Assessment/Plan     ASSESSMENT:  Adenocarcinoma of esophagus  - CBC & Differential  - Comprehensive Metabolic Panel  - CT Chest With Contrast  - CT Abdomen With Contrast      1.  Esophageal adenocarcinoma. Initially T2N0M0. After neoadjuvant carboplatin/Taxol with radiation, achieved a pathologic CR at resection, 4/24/2015.   As per NCCN guidelines, plan CAT scans every 6 months x1 year, then every 6 to 9 months on years 2 and years 3. Defer any surveillance EGDs to Dr. Gonzalez if he feels they are appropriate.   Also as per NCCN guidelines, plan H and P every 3 to 6 months on years 1 and years 2, then every 6 to 12 months' time on years 3 through years 5 and then annually.   Today I reviewed recent CT from 4/3/17 with him which shows no evidence of recurrence.  Images personally reviewed by me.  He appears to remain in remission.    2. Recurrent DVT.  Prior to cancer diagnosis.  Dr. George Mooney manage his Coumadin.      3.  Pancytopenia. He had a white blood cell count in the upper 3s and a hemoglobin in the upper 12s with a normal platelet count prior to beginning chemotherapy. We will monitor this.   Numbers basically stable.    4.  Persistent cough.  He did not complain of this today.  He has seen Dr. Maher Sayied for this.      5.  Left  lower lobe lung scarring.  Unchanged through  November 2015.      6.  Emesis occurring 5 hours after eating on average once every month or 2.  No nausea otherwise.  Denies dysphagia or odynophagia.  He plans to contact Dr. Gonzalez's office to see about getting a repeat EGD.    PLAN:  1.  M.D. in 6 months with CAT scan chest abdomen pelvis with contrast and CBC CMP 1 week prior    Per his request, we rereviewed prognosis today.

## 2017-04-17 DIAGNOSIS — I80.209: ICD-10-CM

## 2017-04-17 RX ORDER — WARFARIN SODIUM 5 MG/1
TABLET ORAL
Qty: 90 TABLET | Refills: 0 | Status: SHIPPED | OUTPATIENT
Start: 2017-04-17 | End: 2017-04-27 | Stop reason: SDUPTHER

## 2017-04-27 DIAGNOSIS — I80.209: ICD-10-CM

## 2017-04-27 RX ORDER — WARFARIN SODIUM 5 MG/1
5 TABLET ORAL
Qty: 30 TABLET | Refills: 0 | Status: SHIPPED | OUTPATIENT
Start: 2017-04-27 | End: 2017-08-21 | Stop reason: SDUPTHER

## 2017-05-01 ENCOUNTER — TELEPHONE (OUTPATIENT)
Dept: INTERNAL MEDICINE | Age: 67
End: 2017-05-01

## 2017-05-17 ENCOUNTER — TELEPHONE (OUTPATIENT)
Dept: INTERNAL MEDICINE | Age: 67
End: 2017-05-17

## 2017-05-18 DIAGNOSIS — K62.5 RECTAL BLEED: Primary | ICD-10-CM

## 2017-05-19 ENCOUNTER — TELEPHONE (OUTPATIENT)
Dept: INTERNAL MEDICINE | Age: 67
End: 2017-05-19

## 2017-05-24 ENCOUNTER — OFFICE VISIT (OUTPATIENT)
Dept: GASTROENTEROLOGY | Facility: CLINIC | Age: 67
End: 2017-05-24

## 2017-05-24 VITALS
HEIGHT: 77 IN | DIASTOLIC BLOOD PRESSURE: 64 MMHG | WEIGHT: 223.6 LBS | SYSTOLIC BLOOD PRESSURE: 110 MMHG | BODY MASS INDEX: 26.4 KG/M2

## 2017-05-24 DIAGNOSIS — K63.5 COLON POLYPS: ICD-10-CM

## 2017-05-24 DIAGNOSIS — K22.719 BARRETT'S ESOPHAGUS WITH DYSPLASIA: ICD-10-CM

## 2017-05-24 DIAGNOSIS — Z85.01 PERSONAL HISTORY OF ESOPHAGEAL CANCER: ICD-10-CM

## 2017-05-24 DIAGNOSIS — K62.5 RECTAL BLEEDING: Primary | ICD-10-CM

## 2017-05-24 PROCEDURE — 99213 OFFICE O/P EST LOW 20 MIN: CPT | Performed by: INTERNAL MEDICINE

## 2017-05-24 RX ORDER — SODIUM CHLORIDE 0.9 % (FLUSH) 0.9 %
1-10 SYRINGE (ML) INJECTION AS NEEDED
Status: CANCELLED | OUTPATIENT
Start: 2017-05-24

## 2017-05-30 DIAGNOSIS — F32.9 REACTIVE DEPRESSION: ICD-10-CM

## 2017-05-30 DIAGNOSIS — F51.01 PRIMARY INSOMNIA: ICD-10-CM

## 2017-05-30 RX ORDER — PAROXETINE HYDROCHLORIDE 20 MG/1
20 TABLET, FILM COATED ORAL NIGHTLY
Qty: 90 TABLET | Refills: 0 | Status: SHIPPED | OUTPATIENT
Start: 2017-05-30 | End: 2017-08-21 | Stop reason: SDUPTHER

## 2017-06-09 ENCOUNTER — PREP FOR SURGERY (OUTPATIENT)
Dept: OTHER | Facility: HOSPITAL | Age: 67
End: 2017-06-09

## 2017-06-09 DIAGNOSIS — R13.10 DYSPHAGIA, UNSPECIFIED TYPE: Primary | ICD-10-CM

## 2017-06-13 ENCOUNTER — HOSPITAL ENCOUNTER (OUTPATIENT)
Facility: HOSPITAL | Age: 67
Setting detail: HOSPITAL OUTPATIENT SURGERY
Discharge: HOME | End: 2017-06-13
Attending: THORACIC SURGERY (CARDIOTHORACIC VASCULAR SURGERY) | Admitting: THORACIC SURGERY (CARDIOTHORACIC VASCULAR SURGERY)

## 2017-06-13 ENCOUNTER — ANESTHESIA EVENT (OUTPATIENT)
Dept: GASTROENTEROLOGY | Facility: HOSPITAL | Age: 67
End: 2017-06-13

## 2017-06-13 ENCOUNTER — ANESTHESIA (OUTPATIENT)
Dept: GASTROENTEROLOGY | Facility: HOSPITAL | Age: 67
End: 2017-06-13

## 2017-06-13 VITALS
BODY MASS INDEX: 25.89 KG/M2 | WEIGHT: 219.25 LBS | SYSTOLIC BLOOD PRESSURE: 110 MMHG | TEMPERATURE: 97.6 F | RESPIRATION RATE: 20 BRPM | OXYGEN SATURATION: 99 % | DIASTOLIC BLOOD PRESSURE: 67 MMHG | HEIGHT: 77 IN | HEART RATE: 70 BPM

## 2017-06-13 DIAGNOSIS — K62.5 RECTAL BLEEDING: ICD-10-CM

## 2017-06-13 DIAGNOSIS — R13.10 DYSPHAGIA: ICD-10-CM

## 2017-06-13 DIAGNOSIS — K63.5 COLON POLYPS: ICD-10-CM

## 2017-06-13 PROCEDURE — S0260 H&P FOR SURGERY: HCPCS | Performed by: INTERNAL MEDICINE

## 2017-06-13 PROCEDURE — 45378 DIAGNOSTIC COLONOSCOPY: CPT | Performed by: INTERNAL MEDICINE

## 2017-06-13 PROCEDURE — S0260 H&P FOR SURGERY: HCPCS | Performed by: THORACIC SURGERY (CARDIOTHORACIC VASCULAR SURGERY)

## 2017-06-13 PROCEDURE — 25010000002 PROPOFOL 10 MG/ML EMULSION: Performed by: NURSE ANESTHETIST, CERTIFIED REGISTERED

## 2017-06-13 PROCEDURE — 88305 TISSUE EXAM BY PATHOLOGIST: CPT | Performed by: THORACIC SURGERY (CARDIOTHORACIC VASCULAR SURGERY)

## 2017-06-13 PROCEDURE — 88312 SPECIAL STAINS GROUP 1: CPT | Performed by: THORACIC SURGERY (CARDIOTHORACIC VASCULAR SURGERY)

## 2017-06-13 RX ORDER — SODIUM CHLORIDE, SODIUM LACTATE, POTASSIUM CHLORIDE, CALCIUM CHLORIDE 600; 310; 30; 20 MG/100ML; MG/100ML; MG/100ML; MG/100ML
30 INJECTION, SOLUTION INTRAVENOUS CONTINUOUS PRN
Status: DISCONTINUED | OUTPATIENT
Start: 2017-06-13 | End: 2017-06-13 | Stop reason: HOSPADM

## 2017-06-13 RX ORDER — ONDANSETRON 2 MG/ML
4 INJECTION INTRAMUSCULAR; INTRAVENOUS ONCE AS NEEDED
Status: DISCONTINUED | OUTPATIENT
Start: 2017-06-13 | End: 2017-06-13 | Stop reason: HOSPADM

## 2017-06-13 RX ORDER — SODIUM CHLORIDE 0.9 % (FLUSH) 0.9 %
1-10 SYRINGE (ML) INJECTION AS NEEDED
Status: DISCONTINUED | OUTPATIENT
Start: 2017-06-13 | End: 2017-06-13 | Stop reason: HOSPADM

## 2017-06-13 RX ORDER — PROMETHAZINE HYDROCHLORIDE 25 MG/ML
12.5 INJECTION, SOLUTION INTRAMUSCULAR; INTRAVENOUS ONCE AS NEEDED
Status: DISCONTINUED | OUTPATIENT
Start: 2017-06-13 | End: 2017-06-13 | Stop reason: HOSPADM

## 2017-06-13 RX ORDER — PROPOFOL 10 MG/ML
VIAL (ML) INTRAVENOUS CONTINUOUS PRN
Status: DISCONTINUED | OUTPATIENT
Start: 2017-06-13 | End: 2017-06-13 | Stop reason: SURG

## 2017-06-13 RX ADMIN — SODIUM CHLORIDE, POTASSIUM CHLORIDE, SODIUM LACTATE AND CALCIUM CHLORIDE 30 ML/HR: 600; 310; 30; 20 INJECTION, SOLUTION INTRAVENOUS at 12:20

## 2017-06-13 RX ADMIN — EPHEDRINE SULFATE 10 MG: 50 INJECTION INTRAMUSCULAR; INTRAVENOUS; SUBCUTANEOUS at 13:48

## 2017-06-13 RX ADMIN — PROPOFOL 160 MCG/KG/MIN: 10 INJECTION, EMULSION INTRAVENOUS at 13:09

## 2017-06-13 NOTE — DISCHARGE INSTRUCTIONS
For the next 24 hours patient needs to be with a responsible adult.    For 24 hours DO NOT drive, operate machinery, appliances, drink alcohol, make important decisions or sign legal documents.    Start with a light or bland diet and advance to regular diet as tolerated.    Follow recommendations on procedure report provided by your doctor.    Call Dr Millan for problems [phone]. Dr Gonzalez [phone]    Problems may include but not limited to: large amounts of bleeding, trouble breathing, repeated vomiting, severe unrelieved pain, fever or chills.

## 2017-06-13 NOTE — PLAN OF CARE
Problem: Patient Care Overview (Adult)  Goal: Plan of Care Review  Outcome: Ongoing (interventions implemented as appropriate)    06/13/17 1156   Coping/Psychosocial Response Interventions   Plan Of Care Reviewed With patient   Patient Care Overview   Progress no change       Goal: Adult Individualization and Mutuality  Outcome: Ongoing (interventions implemented as appropriate)  Goal: Discharge Needs Assessment  Outcome: Ongoing (interventions implemented as appropriate)    06/13/17 1156   Discharge Needs Assessment   Concerns To Be Addressed basic needs concerns   Equipment Needed After Discharge cane, straight   Discharge Disposition home or self-care   Self-Care   Equipment Currently Used at Home none   Living Environment   Transportation Available car         Problem: GI Endoscopy (Adult)  Goal: Signs and Symptoms of Listed Potential Problems Will be Absent or Manageable (GI Endoscopy)  Outcome: Ongoing (interventions implemented as appropriate)    06/13/17 1156   GI Endoscopy   Problems Assessed (GI Endoscopy) pain   Problems Present (GI Endoscopy) none

## 2017-06-13 NOTE — H&P
[facility]  Pre Procedure History & Physical    Chief Complaint:   History of polyps, rectal bleeding    Subjective     HPI:   This 67-year-old male presents to the endoscopy suite for colonoscopic evaluation.  He has a history of colon polyps and rectal bleeding.  His last colonoscopy of record was in December 2014.  He also has a history of Pardo's esophagus and esophageal cancer and is undergoing upper endoscopy by Dr. Lake Gonzalez.    Past Medical History:   Past Medical History:   Diagnosis Date   • Anti-phospholipid syndrome     On lifelong anticoagulation therapy   • Atrial fibrillation     HAD IT FOR 3 HR AFTER SX ON ESOPHAGUS  AND CONVERTED BACK ON OWN   • Benign prostatic hypertrophy    • Bladder disorder     LEAKAGE   ON MED   • Clotting disorder    • Community acquired pneumonia    • Cough     Seeing pulmonary medicine February 2016   • Deep venous thrombosis 2008 2006   • Esophageal carcinoma 12/31/2014    had chemo and radiation prior surgery   • Esophageal reflux    • Fatigue    • GERD (gastroesophageal reflux disease)    • Hemorrhoids    • Hypertension    • Long-term (current) use of anticoagulants, INR goal 2.0-3.0    • Malignant neoplasm of prostate    • Pneumonia    • Radiation    • Restless legs syndrome    • Squamous carcinoma     on the head   • Stasis dermatitis    • Weight loss        Family History:  Family History   Problem Relation Age of Onset   • Cerebral aneurysm Mother      cerebral artery aneurysm   • Prostate cancer Brother 68   • Malig Hyperthermia Neg Hx        Social History:   reports that he quit smoking about 43 years ago. His smoking use included Cigarettes. He has a 4.00 pack-year smoking history. He has never used smokeless tobacco. He reports that he drinks about 1.8 oz of alcohol per week  He reports that he does not use illicit drugs.    Medications:   Prescriptions Prior to Admission   Medication Sig Dispense Refill Last Dose   •  "oxybutynin (DITROPAN) 5 MG tablet Take 5 mg by mouth 2 (Two) Times a Day.   6/10/2017   • PARoxetine (PAXIL) 20 MG tablet Take 1 tablet by mouth Every Night. 90 tablet 0 6/10/2017   • pramipexole (MIRAPEX) 1.5 MG tablet TAKE 1 TABLET TWICE DAILY (Patient taking differently: TAKE 1 TABLET DAILY) 180 tablet 3 6/12/2017 at Unknown time   • warfarin (COUMADIN) 5 MG tablet Take 1 tablet by mouth Daily. (Patient taking differently: Take 5 mg by mouth Daily. HOLDING FOR PROCEDURE) 30 tablet 0 6/9/2017       Allergies:  Review of patient's allergies indicates no known allergies.    ROS:    Pertinent items are noted in HPI, all other systems reviewed and negative     Objective     Blood pressure 129/84, pulse 77, temperature 97.6 °F (36.4 °C), temperature source Oral, resp. rate 16, height 77\" (195.6 cm), weight 219 lb 4 oz (99.5 kg), SpO2 98 %.    Physical Exam   Constitutional: Pt is oriented to person, place, and time and well-developed, well-nourished, and in no distress.   HENT:   Mouth/Throat: Oropharynx is clear and moist.   Neck: Normal range of motion. Neck supple.   Cardiovascular: Normal rate, regular rhythm and normal heart sounds.    Pulmonary/Chest: Effort normal and breath sounds normal. No respiratory distress. No  wheezes.   Abdominal: Soft. Bowel sounds are normal.   Skin: Skin is warm and dry.   Psychiatric: Mood, memory, affect and judgment normal.     Assessment/Plan     Diagnosis:  History of polyps  Rectal bleeding  Esophagus  Esophageal cancer    Anticipated Surgical Procedure:  Colonoscopy    The risks, benefits, and alternatives of this procedure have been discussed with the patient or the responsible party- the patient understands and agrees to proceed.                                                                "

## 2017-06-13 NOTE — ANESTHESIA POSTPROCEDURE EVALUATION
Patient: Jose Hurtado    Procedure Summary     Date Anesthesia Start Anesthesia Stop Room / Location    06/13/17 [id number]  [facility]       Procedure Diagnosis Surgeon Provider    ESOPHAGOGASTRODUODENOSCOPY WITH COLD BIOPSY (N/A Esophagus); COLONOSCOPY TO CECUM AND INTO TERMINAL ILEUM (N/A ) Tortuous colon; Hemorrhoids Lake Gonzalez MD; MD Melly Carson MD          Anesthesia Type: MAC  Last vitals  BP 98/55 (06/13/17 1404)    Temp      Pulse 69 (06/13/17 1404)   Resp 16 (06/13/17 1404)    SpO2 98 % (06/13/17 1404)      Post Anesthesia Care and Evaluation    Patient location during evaluation: PHASE II  Anesthetic complications: No anesthetic complications

## 2017-06-13 NOTE — BRIEF OP NOTE
ESOPHAGOGASTRODUODENOSCOPY WITH DILATATION  Procedure Note    Jose Hurtado  6/13/2017    Pre-op Diagnosis:   Dysphagia   History of esophageal cancer   Post-op Diagnosis:     Same   Gastroparesis   Procedure/CPT® Codes:      Procedure(s):  ESOPHAGOGASTRODUODENOSCOPY WITH COLD BIOPSY  COLONOSCOPY    Surgeon(s):  MD Lake Carson MD    Anesthesia: Monitor Anesthesia Care    Staff:   Radiology Technologist: Christopher Rice RRT  Endo Technician: Mirtha Bhatt  Endo Nurse: Khushboo Fulton RN    Estimated Blood Loss: *No blood loss documented*  Urine Voided: * No values recorded between 6/13/2017  1:07 PM and 6/13/2017  1:39 PM *    Specimens:                  ID Type Source Tests Collected by Time Destination   A : ANASTOMOSIS AT 28 CM BIOPSY Tissue Esophagus TISSUE EXAM Lake Gonzalez MD 6/13/2017 1326          Drains:           Findings: Wide open anastomosis. Retained food in the stomach.     Complications: None       Lake Gonzalez MD     Date: 6/13/2017  Time: 1:39 PM

## 2017-06-13 NOTE — H&P
Subjective   Patient ID: Jose Hurtado is a 67 y.o. male is here today for follow-up.    History of Present Illness  Dear Colleague,  Jose Hurtado was seen in our office today for continued follow up and surveillance after his esophagectomy for esophageal cancer. He continues to have episodes of dysphagia to liquids and solids.  He denies any complaints of fever, chills, cough, hemoptysis, pleuritic chest pain, shortness of air, dyspnea with exertion, night sweats, hoarseness, or unintentional weight loss. Underlying medical conditions including BPH, restless leg syndrome and anemia remain stable.  He has no other somatic complaints or alleviating or exacerbating factors aside from those mentioned above.    The following portions of the patient's history were reviewed and updated as appropriate: allergies, current medications, past family history, past medical history, past social history, past surgical history and problem list.  Review of Systems   Gastrointestinal: Positive for change in bowel habit, constipation, diarrhea, dysphagia, nausea and vomiting.   All other systems reviewed and are negative.    Patient Active Problem List   Diagnosis   • Adenocarcinoma of esophagus   • Adenomatous polyp of colon   • Anemia   • Duodenal ulcer   • Insomnia   • Malignant neoplasm of prostate   • Restless legs syndrome   • Thrombophlebitis of deep veins of lower extremity   • Ventral hernia without obstruction or gangrene   • Incisional hernia, without obstruction or gangrene   • Hernia, incisional   • Osteoarthrosis, localized, primary, knee   • Benign prostatic hyperplasia   • Venous stasis dermatitis   • Medicare annual wellness visit, subsequent   • Reactive depression     Past Medical History:   Diagnosis Date   • Anti-phospholipid syndrome     On lifelong anticoagulation therapy   • Atrial fibrillation     HAD IT FOR 3 HR AFTER SX ON ESOPHAGUS  AND CONVERTED BACK ON OWN   • Benign prostatic hypertrophy    • Bladder  disorder     LEAKAGE   ON MED   • Clotting disorder    • Community acquired pneumonia    • Cough     Seeing pulmonary medicine February 2016   • Deep venous thrombosis 2008 2006   • Esophageal carcinoma 12/31/2014    had chemo and radiation prior surgery   • Esophageal reflux    • Fatigue    • GERD (gastroesophageal reflux disease)    • Hemorrhoids    • Hypertension    • Long-term (current) use of anticoagulants, INR goal 2.0-3.0    • Malignant neoplasm of prostate    • Pneumonia    • Radiation    • Restless legs syndrome    • Squamous carcinoma     on the head   • Stasis dermatitis    • Weight loss      Past Surgical History:   Procedure Laterality Date   • APPENDECTOMY  1950   • BRONCHOSCOPY      (Diagnostic)   • COLONOSCOPY  12/15/2014    Complete / Description: EH, IH, torts, stool, follow-up colonoscopy due in 5 years.   • ESOPHAGECTOMY      April 2015, stage IIB esophageal carcinoma, sub-total resection.   • ESOPHAGECTOMY      Esophagectomy Subtotal Warren Joe Procedure   • EXCISION LESION  08/2012    Removal of Squamous Cell CA on Head   • HAMMER TOE REPAIR  09/2014    Hammertoe Operation (Each Toe), 10/2014   • HERNIA REPAIR     • JEJUNOSTOMY      Laparoscopic   • PROSTATE SURGERY     • PROSTATECTOMY  2010   • PYLOROPLASTY     • SPINAL FUSION  02/1998    C6-C7   • TONSILLECTOMY     • UPPER GASTROINTESTINAL ENDOSCOPY  12/15/2014    LA Grade D esophagitis, Pardo's, HH, multiple duodenal ulcers   • UPPER GASTROINTESTINAL ENDOSCOPY      (Therapeutic)   • VENTRAL/INCISIONAL HERNIA REPAIR N/A 4/14/2016    Procedure: VENTRAL/INCISIONAL HERNIA REPAIR, open, with mesh, and component separation;  Surgeon: Darren Rivas MD;  Location: [facility];  Service:      Family History   Problem Relation Age of Onset   • Cerebral aneurysm Mother      cerebral artery aneurysm   • Prostate cancer Brother 68   • Malig Hyperthermia Neg Hx      Social History     Social History   • Marital status:       Spouse name: Lena   • Number of children: 0   • Years of education: College     Occupational History   •  Self-Employed     Social History Main Topics   • Smoking status: Former Smoker     Packs/day: 2.00     Years: 2.00     Types: Cigarettes     Quit date: 1974   • Smokeless tobacco: Never Used   • Alcohol use 1.8 oz/week     1 Glasses of wine, 1 Cans of beer, 1 Shots of liquor per week      Comment: occassionally   • Drug use: No   • Sexual activity: Not on file     Other Topics Concern   • Not on file     Social History Narrative    self-employed      No current facility-administered medications for this encounter.     Current Outpatient Prescriptions:   •  oxybutynin (DITROPAN) 5 MG tablet, Take 5 mg by mouth 2 (Two) Times a Day., Disp: , Rfl:   •  PARoxetine (PAXIL) 20 MG tablet, Take 1 tablet by mouth Every Night., Disp: 90 tablet, Rfl: 0  •  pramipexole (MIRAPEX) 1.5 MG tablet, TAKE 1 TABLET TWICE DAILY (Patient taking differently: TAKE 1 TABLET DAILY), Disp: 180 tablet, Rfl: 3  •  warfarin (COUMADIN) 5 MG tablet, Take 1 tablet by mouth Daily. (Patient taking differently: Take 5 mg by mouth Daily. HOLDING FOR PROCEDURE), Disp: 30 tablet, Rfl: 0  No Known Allergies     Objective   There were no vitals filed for this visit.  Physical Exam   Constitutional: He is oriented to person, place, and time. Vital signs are normal. He appears well-developed and well-nourished. No distress.   HENT:   Head: Normocephalic and atraumatic.   Eyes: EOM are normal. Pupils are equal, round, and reactive to light.   Neck: Normal range of motion. Neck supple. No JVD present. No tracheal deviation present.   Cardiovascular: Normal rate, regular rhythm, normal heart sounds and intact distal pulses.    No murmur heard.  Pulmonary/Chest: Effort normal and breath sounds normal. He has no wheezes. He has no rhonchi. He has no rales. He exhibits no tenderness.   Abdominal: Soft. He exhibits no mass. There is no  tenderness.   Musculoskeletal: Normal range of motion. He exhibits no edema or tenderness.   Lymphadenopathy:     He has no cervical adenopathy.   Neurological: He is alert and oriented to person, place, and time. He has normal strength.   Skin: Skin is warm and dry. No rash noted. No cyanosis or erythema.   Psychiatric: He has a normal mood and affect. His behavior is normal.     Independent Review of Radiographic Studies:    CT SCAN OF THE ABDOMEN AND PELVIS WITH CONTRAST      CLINICAL HISTORY: Esophageal carcinoma. Status post resection.  Restaging. Evaluate for metastatic disease.      TECHNIQUE: Spiral CT images were obtained through the chest, abdomen and  pelvis with oral and IV contrast and were reconstructed in 3 mm thick  axial slices.      COMPARISON: CT scans of the chest abdomen and pelvis dated 10/13/2016.      FINDINGS: The patient is status post esophagectomy with gastric  pull-through. There is no mediastinal or hilar or axillary  lymphadenopathy. There is a small posteriorly layering right pleural  effusion that has increased in size slightly in the interval. Lung  window images demonstrate mild curvilinear atelectasis at the right lung  base appears slightly more prominent. There are no lung nodules or focal  areas of consolidation. The liver and spleen and pancreas and adrenal  glands appear within normal limits. There are 2 small adjacent  nonobstructing calculi in the lower pole of the right kidney. A single  small nonobstructing calculus is present in the lower pole of the left  kidney. The colon and small bowel are unremarkable. No lymphadenopathy  is identified in the abdomen or pelvis. Prostate gland appears absent.  Bone window images demonstrate no lytic or blastic lesions.      IMPRESSION:  Status post changes as described. There is a tiny right  pleural effusion that appears slightly larger than on the previous CT  dated 10/13/2016. Otherwise unremarkable CT scan of the chest,  abdomen  and pelvis. There is no compelling evidence of metastatic disease.      This report was finalized on 4/3/2017 7:58 PM by Dr. Rios Denny MD.  Assessment/Plan   Assessment:  History of esophageal cancer T2N0M0. Status post Renny Adjuvant chemotherapy and radiation followed by surgery.   Emesis intermittently.   Plan:  No signs of recurrent disease on survalliance CT scans. Plan to perform EGD to evaluate anastomosis and stomach. Hold anticoagulation for three days prior.   [unfilled]

## 2017-06-13 NOTE — ANESTHESIA PREPROCEDURE EVALUATION
Anesthesia Evaluation     no history of anesthetic complications:  NPO Solid Status: > 6 hours       Airway   Mallampati: III  TM distance: >3 FB  Neck ROM: full  no difficulty expected  Dental - normal exam     Pulmonary - normal exam   Cardiovascular - normal exam    (+) hypertension,   (-) angina      Neuro/Psych  GI/Hepatic/Renal/Endo      ROS Comment: Esophageal cancer 2014    Musculoskeletal     Abdominal    Substance History      OB/GYN          Other                                        Anesthesia Plan    ASA 3     MAC     Anesthetic plan and risks discussed with patient.

## 2017-06-15 LAB
CYTO UR: NORMAL
LAB AP CASE REPORT: NORMAL
Lab: NORMAL
PATH REPORT.FINAL DX SPEC: NORMAL
PATH REPORT.GROSS SPEC: NORMAL

## 2017-06-22 ENCOUNTER — TELEPHONE (OUTPATIENT)
Dept: INTERNAL MEDICINE | Age: 67
End: 2017-06-22

## 2017-06-22 NOTE — TELEPHONE ENCOUNTER
Called pt with PT/INR results. Pt was instructed to continue current dose. Pt was instructed to recheck in 4 weeks. Pt demonstrated understanding.    KD

## 2017-06-28 RX ORDER — PRAMIPEXOLE DIHYDROCHLORIDE 1.5 MG/1
TABLET ORAL
Qty: 180 TABLET | Refills: 3 | Status: SHIPPED | OUTPATIENT
Start: 2017-06-28 | End: 2017-09-27

## 2017-07-25 ENCOUNTER — TELEPHONE (OUTPATIENT)
Dept: INTERNAL MEDICINE | Age: 67
End: 2017-07-25

## 2017-07-25 NOTE — TELEPHONE ENCOUNTER
----- Message from George Mooney MD sent at 7/25/2017 11:23 AM EDT -----  INR is therapeutic, continue present dosage of Coumadin, and repeat INR in 4 weeks.

## 2017-08-14 ENCOUNTER — TELEPHONE (OUTPATIENT)
Dept: INTERNAL MEDICINE | Age: 67
End: 2017-08-14

## 2017-08-14 NOTE — TELEPHONE ENCOUNTER
----- Message from George Mooney MD sent at 8/14/2017 11:12 AM EDT -----  Regarding: RE: referral  Contact: [phone]  Yes bring him in.  JS  ----- Message -----     From: Orin Eng LPN     Sent: 8/14/2017  11:08 AM       To: George Mooney MD  Subject: FW: referral                                     No documentation of edema in AWV or phone conversations.   Bring in for eval? MM  ----- Message -----     From: Gogo Obrien MA     Sent: 8/14/2017   8:31 AM       To: Orin Eng LPN  Subject: referral                                         Patient called 8-14-17 @ 8:32 stated that he needs a referral [facility]. He stated that  is aware of the problem. Last seen in the office this past January for an AWV. Please let him know.

## 2017-08-21 DIAGNOSIS — F51.01 PRIMARY INSOMNIA: ICD-10-CM

## 2017-08-21 DIAGNOSIS — F32.9 REACTIVE DEPRESSION: ICD-10-CM

## 2017-08-21 DIAGNOSIS — I80.209: ICD-10-CM

## 2017-08-21 RX ORDER — WARFARIN SODIUM 5 MG/1
TABLET ORAL
Qty: 90 TABLET | Refills: 0 | Status: SHIPPED | OUTPATIENT
Start: 2017-08-21 | End: 2017-09-27

## 2017-08-21 RX ORDER — PAROXETINE HYDROCHLORIDE 20 MG/1
TABLET, FILM COATED ORAL
Qty: 90 TABLET | Refills: 0 | Status: SHIPPED | OUTPATIENT
Start: 2017-08-21 | End: 2017-09-27

## 2017-08-24 ENCOUNTER — OFFICE VISIT (OUTPATIENT)
Dept: INTERNAL MEDICINE | Age: 67
End: 2017-08-24

## 2017-08-24 ENCOUNTER — OFFICE VISIT (OUTPATIENT)
Dept: ORTHOPEDIC SURGERY | Facility: CLINIC | Age: 67
End: 2017-08-24

## 2017-08-24 VITALS
BODY MASS INDEX: 28.1 KG/M2 | OXYGEN SATURATION: 97 % | TEMPERATURE: 98.2 F | HEIGHT: 77 IN | DIASTOLIC BLOOD PRESSURE: 72 MMHG | WEIGHT: 238 LBS | HEART RATE: 78 BPM | SYSTOLIC BLOOD PRESSURE: 124 MMHG

## 2017-08-24 VITALS — WEIGHT: 225 LBS | BODY MASS INDEX: 26.57 KG/M2 | TEMPERATURE: 97.7 F | HEIGHT: 77 IN

## 2017-08-24 DIAGNOSIS — I87.009 POSTPHLEBITIC SYNDROME: Primary | ICD-10-CM

## 2017-08-24 DIAGNOSIS — M17.11 PRIMARY OSTEOARTHRITIS OF RIGHT KNEE: Primary | ICD-10-CM

## 2017-08-24 DIAGNOSIS — I10 ESSENTIAL HYPERTENSION: Primary | ICD-10-CM

## 2017-08-24 PROBLEM — I82.409 DVT (DEEP VENOUS THROMBOSIS): Status: ACTIVE | Noted: 2017-08-24

## 2017-08-24 PROCEDURE — 99213 OFFICE O/P EST LOW 20 MIN: CPT | Performed by: INTERNAL MEDICINE

## 2017-08-24 PROCEDURE — 73562 X-RAY EXAM OF KNEE 3: CPT | Performed by: ORTHOPAEDIC SURGERY

## 2017-08-24 PROCEDURE — 99213 OFFICE O/P EST LOW 20 MIN: CPT | Performed by: ORTHOPAEDIC SURGERY

## 2017-08-24 RX ORDER — TRIAMTERENE AND HYDROCHLOROTHIAZIDE 37.5; 25 MG/1; MG/1
2 TABLET ORAL DAILY
Qty: 60 TABLET | Refills: 3 | Status: SHIPPED | OUTPATIENT
Start: 2017-08-24 | End: 2018-01-30 | Stop reason: DRUGHIGH

## 2017-08-24 RX ORDER — TRIAMTERENE AND HYDROCHLOROTHIAZIDE 37.5; 25 MG/1; MG/1
2 TABLET ORAL DAILY
Qty: 60 TABLET | Refills: 0 | COMMUNITY
Start: 2017-08-24 | End: 2017-08-24 | Stop reason: SDUPTHER

## 2017-08-24 NOTE — PROGRESS NOTES
"Patient: Jose Hurtado  YOB: 1950 67 y.o. male  Medical Record Number: [id number]    Chief Complaints:   Chief Complaint   Patient presents with   • Right Knee - Follow-up       History of Present Illness:Jose Hurtado is a 67 y.o. male who presents for follow-up of  Right knee advanced Auster arthritis.  He did some physical therapy about a year ago and was doing home exercises however he has had some issues which have essentially derailed him from his therapy program.  At this point he says is not severe enough to consider knee replacement.  He does have a constant ache and at times more pain.  He has a history of pulmonary embolism and is on Coumadin..    Allergies: No Known Allergies    Medications:   Current Outpatient Prescriptions   Medication Sig Dispense Refill   • oxybutynin (DITROPAN) 5 MG tablet Take 5 mg by mouth 2 (Two) Times a Day.     • PARoxetine (PAXIL) 20 MG tablet TAKE 1 TABLET EVERY NIGHT 90 tablet 0   • pramipexole (MIRAPEX) 1.5 MG tablet TAKE 1 TABLET TWICE DAILY 180 tablet 3   • warfarin (COUMADIN) 5 MG tablet TAKE 1 TABLET EVERY DAY 90 tablet 0     No current facility-administered medications for this visit.          The following portions of the patient's history were reviewed and updated as appropriate: allergies, current medications, past family history, past medical history, past social history, past surgical history and problem list.    Review of Systems:   A 14 point review of systems was performed. All systems negative except pertinent positives/negative listed in HPI above    Physical Exam:   Vitals:    08/24/17 0804   Temp: 97.7 °F (36.5 °C)   Weight: 225 lb (102 kg)   Height: 77\" (195.6 cm)       General: A and O x 3, ASA, NAD    SCLERA:    Normal    DENTITION:   Normal  Knee:  right    ALIGNMENT:     Valgus      GAIT:    Antalgic    SKIN:    No abnormality    RANGE OF MOTION:   7  -  95   DEG    STRENGTH:   4  / 5    LIGAMENTS:    No varus / valgus instability.   " Negative  Lachman.    MENISCUS:     Negative   Jackie       DISTAL PULSES:    Paplable    DISTAL SENSATION :   Intact    LYMPHATICS:     No   lymphadenopathy    OTHER:          - Positive   effusion      - Crepitance with ROM     Radiology:  Xrays 3views right knee  (ap,lateral, sunrise) were ordered and reviewed for evaluation of knee pain demonstratingadvanced valgus osteoarthritis with bone on bone articulation, subchondral cysts, and periarticular osteophytes  todays xrays were compared to previous xrays and demonstrate no change    Assessment/Plan:  Right knee advanced osteoarthritis.  Clearly he will require knee replacement at some point down the road.  We'll trying to continue with conservative measures.  Given his history of pulmonary embolism I think conservative measures are great option.  It's been a while since he was seen by his therapist would like to send him back for a refresher on what he supposed to be doing to help with the muscles and tendons surrounding the knee.  I'll see him back as needed in the future.

## 2017-09-01 ENCOUNTER — HOSPITAL ENCOUNTER (OUTPATIENT)
Dept: PHYSICAL THERAPY | Facility: HOSPITAL | Age: 67
Setting detail: THERAPIES SERIES
Discharge: HOME | End: 2017-09-01
Attending: ORTHOPAEDIC SURGERY

## 2017-09-01 ENCOUNTER — OFFICE VISIT (OUTPATIENT)
Dept: ORTHOPEDIC SURGERY | Facility: CLINIC | Age: 67
End: 2017-09-01

## 2017-09-01 VITALS — BODY MASS INDEX: 26.79 KG/M2 | WEIGHT: 220 LBS | HEIGHT: 76 IN | TEMPERATURE: 97.9 F

## 2017-09-01 DIAGNOSIS — M79.671 BILATERAL FOOT PAIN: Primary | ICD-10-CM

## 2017-09-01 DIAGNOSIS — M79.672 BILATERAL FOOT PAIN: Primary | ICD-10-CM

## 2017-09-01 DIAGNOSIS — M20.41 HAMMER TOES OF BOTH FEET: ICD-10-CM

## 2017-09-01 DIAGNOSIS — M17.11 PRIMARY OSTEOARTHRITIS OF RIGHT KNEE: Primary | ICD-10-CM

## 2017-09-01 DIAGNOSIS — R26.9 ABNORMAL GAIT: ICD-10-CM

## 2017-09-01 DIAGNOSIS — M25.561 RIGHT KNEE PAIN, UNSPECIFIED CHRONICITY: ICD-10-CM

## 2017-09-01 DIAGNOSIS — M20.42 HAMMER TOES OF BOTH FEET: ICD-10-CM

## 2017-09-01 PROCEDURE — G8979 MOBILITY GOAL STATUS: HCPCS | Performed by: PHYSICAL THERAPIST

## 2017-09-01 PROCEDURE — 99214 OFFICE O/P EST MOD 30 MIN: CPT | Performed by: ORTHOPAEDIC SURGERY

## 2017-09-01 PROCEDURE — G8978 MOBILITY CURRENT STATUS: HCPCS | Performed by: PHYSICAL THERAPIST

## 2017-09-01 PROCEDURE — 73630 X-RAY EXAM OF FOOT: CPT | Performed by: ORTHOPAEDIC SURGERY

## 2017-09-01 PROCEDURE — 97140 MANUAL THERAPY 1/> REGIONS: CPT | Performed by: PHYSICAL THERAPIST

## 2017-09-01 PROCEDURE — 97161 PT EVAL LOW COMPLEX 20 MIN: CPT | Performed by: PHYSICAL THERAPIST

## 2017-09-01 PROCEDURE — 97110 THERAPEUTIC EXERCISES: CPT | Performed by: PHYSICAL THERAPIST

## 2017-09-01 RX ORDER — CEFAZOLIN SODIUM 2 G/100ML
2 INJECTION, SOLUTION INTRAVENOUS ONCE
Status: CANCELLED | OUTPATIENT
Start: 2017-10-03 | End: 2017-09-01

## 2017-09-01 RX ORDER — SODIUM CHLORIDE 0.9 % (FLUSH) 0.9 %
1-10 SYRINGE (ML) INJECTION AS NEEDED
Status: CANCELLED | OUTPATIENT
Start: 2017-10-03

## 2017-09-01 NOTE — PROGRESS NOTES
"Foot Follow Up      Patient: Jose Hurtado    YOB: 1950 67 y.o. male    Chief Complaints: My toes hurt    History of Present Illness: Patient has a history of bilateral hammertoes done elsewhere in 2014. I had  seen him for this prior to that.  He apologized profusely to me today for going elsewhere and told him I completely understood.  He now has persistent complaints of moderate pain over the  left second third and fourth toes with a rub in his shoes.  He really has no significant complaints of pain at the PIP joints.  he's not had any ulceration or breakdown.  He has only mild complaints on the right  HPI    ROS: Foot pain no numbness or tingling nonantalgic gait.    Past Medical History:   Diagnosis Date   • Anti-phospholipid syndrome     On lifelong anticoagulation therapy   • Atrial fibrillation     HAD IT FOR 3 HR AFTER SX ON ESOPHAGUS  AND CONVERTED BACK ON OWN   • Benign prostatic hypertrophy    • Bladder disorder     LEAKAGE   ON MED   • Clotting disorder    • Community acquired pneumonia    • Cough     Seeing pulmonary medicine February 2016   • Deep venous thrombosis 2008 2006   • Esophageal carcinoma 12/31/2014    had chemo and radiation prior surgery   • Esophageal reflux    • Fatigue    • GERD (gastroesophageal reflux disease)    • Hemorrhoids    • Hypertension    • Long-term (current) use of anticoagulants, INR goal 2.0-3.0    • Malignant neoplasm of prostate    • Pneumonia    • Radiation    • Restless legs syndrome    • 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  There was really no focal pain or significant deformity at the PIP joint third second through fourth toes.  There was malleting of the second and third and some to the fourth toe at the " DIP joint that was not passively correctable to neutral.  Palpable dorsalis pedis pulse with brisk capillary refill to the toes.  Right foot showed minimal flexion deformity of the second and third more so than fourth toes but no callus.      Radiology: 3 views of both feet were ordered today to evaluate pain and reviewed these were compared with x-rays from 2014.  There appears to been fusion at the second third and fourth PIP joints with minimal deformity.  There is flexion deformity of the DIP joints of the second third and fourth toes.    Right foot shows previous surgery the second third and fourth PIP joints with less flexion deformity at the DIP joints        Assessment/Plan:  1.  Left foot second third and fourth mallet toes after hammertoe correction with PIP fusions.  2.  Right foot previous hammertoe correction with slight flexion deformity at the DIP joints    We discussed treatment options right foot is not terribly bothersome.  Left foot is and although no guarantees could be given patient like to proceed with operative treatment after discussion of operative and nonoperative treatment options.    I don't think we need to do anything with this PIP joints as there is only minimal deformity there and no pain.  I would recommend second third and fourth toe DIP joint resection and flexor tenotomies to get these in a more neutral alignment and prevent the pain is having over the tips of his toes and to prevent ulceration or callus.    I reviewed the procedure with him in detail with associated risks benefits Center outcomes and complications which can include but are not limited to heart attack stroke death pneumonia infection bleeding damage to blood vessels and nerves or tendons blood clots pulmonary embolism persistent or worsening pain stiffness recurrence of deformity and failure to return to presurgery and precondition levels of activity as well as loss of the toes.    He understands he will have pins  coming out of the tips of his toes for at least 3-4 weeks and will be limited in weightbearing during that time.    All of his questions were answered to his full satisfaction and we will schedule this on an outpatient basis at a mutually convenient time.    He is going to check with his PCP Dr. Mooney about bridging him off of his Coumadin prior to surgery.

## 2017-09-01 NOTE — THERAPY EVALUATION
Outpatient Physical Therapy Ortho Initial Evaluation  [facility]     Patient Name: Jose Hurtado  : 1950  MRN: [id number]  Today's Date: 2017      Visit Date: 2017    Patient Active Problem List   Diagnosis   • Adenocarcinoma of esophagus   • Adenomatous polyp of colon   • Anemia   • Duodenal ulcer   • Insomnia   • Malignant neoplasm of prostate   • RLS (restless legs syndrome)   • Thrombophlebitis of deep veins of lower extremity   • Ventral hernia without obstruction or gangrene   • Incisional hernia, without obstruction or gangrene   • Hernia, incisional   • Osteoarthrosis, localized, primary, knee   • BPH (benign prostatic hyperplasia)   • Stasis dermatitis   • Medicare annual wellness visit, subsequent   • Reactive depression   • DVT (deep venous thrombosis)        Past Medical History:   Diagnosis Date   • Anti-phospholipid syndrome     On lifelong anticoagulation therapy   • Atrial fibrillation     HAD IT FOR 3 HR AFTER SX ON ESOPHAGUS  AND CONVERTED BACK ON OWN   • Benign prostatic hypertrophy    • Bladder disorder     LEAKAGE   ON MED   • Clotting disorder    • Community acquired pneumonia    • Cough     Seeing pulmonary medicine 2016   • Deep venous thrombosis 2008   • Esophageal carcinoma 2014    had chemo and radiation prior surgery   • Esophageal reflux    • Fatigue    • GERD (gastroesophageal reflux disease)    • Hemorrhoids    • Hypertension    • Long-term (current) use of anticoagulants, INR goal 2.0-3.0    • Malignant neoplasm of prostate    • Pneumonia    • Radiation    • Restless legs syndrome    • 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   • COLONOSCOPY N/A 2017    Procedure:  COLONOSCOPY TO CECUM AND INTO TERMINAL ILEUM;  Surgeon: Mulugeta BALCKWELL MD;  Location: [facility] ENDOSCOPY;  Service:    • ENDOSCOPY N/A 6/13/2017    Procedure: ESOPHAGOGASTRODUODENOSCOPY WITH COLD BIOPSY;  Surgeon: Lake Gonzalez MD;  Location: [facility] ENDOSCOPY;  Service:    • ESOPHAGECTOMY      April 2015, stage IIB esophageal carcinoma, sub-total resection.   • ESOPHAGECTOMY      Esophagectomy Subtotal Andrea Joe Procedure   • EXCISION LESION  08/2012    Removal of Squamous Cell CA on Head   • HAMMER TOE REPAIR  09/2014    Hammertoe Operation (Each Toe), 10/2014   • HERNIA REPAIR     • JEJUNOSTOMY      Laparoscopic   • PROSTATE SURGERY     • PROSTATECTOMY  2010   • PYLOROPLASTY     • SPINAL FUSION  02/1998    C6-C7   • TONSILLECTOMY     • UPPER GASTROINTESTINAL ENDOSCOPY  12/15/2014    LA Grade D esophagitis, Pardo's, HH, multiple duodenal ulcers   • UPPER GASTROINTESTINAL ENDOSCOPY      (Therapeutic)   • VENTRAL/INCISIONAL HERNIA REPAIR N/A 4/14/2016    Procedure: VENTRAL/INCISIONAL HERNIA REPAIR, open, with mesh, and component separation;  Surgeon: Darren Rivas MD;  Location: [facility] MAIN OR;  Service:        Visit Dx:     ICD-10-CM ICD-9-CM   1. Primary osteoarthritis of right knee M17.11 715.16   2. Right knee pain, unspecified chronicity M25.561 719.46   3. Abnormal gait R26.9 781.2             Patient History       09/01/17 1000          History    Chief Complaint Difficulty Walking;Difficulty with daily activities;Joint stiffness;Muscle weakness;Pain  -      Type of Pain Knee pain   RIGHT  -      Date Current Problem(s) Began 08/03/16  -      Brief Description of Current Complaint Pt previously was attending PT for treatment regarding R knee OA. He reports that him and his wife were in the middle of a move recently and he has been unable to perform any therex. He is attending PT to resume therapy get back to regular performance of HEP.  -      Previous treatment for THIS PROBLEM  Injections;Medication  -LC      Onset Date- PT 9/1/17  -LC      Patient/Caregiver Goals Relieve pain;Return to prior level of function;Improve mobility;Improve strength  -LC      Patient's Rating of General Health Good  -LC      Occupation/sports/leisure activities semi retired   -LC      Pain     Pain Location Knee   RIGHT  -LC      Pain at Present 3  -LC      Pain at Best 1  -LC      Pain at Worst 5  -LC      Pain Frequency Constant/continuous  -LC      Pain Description Aching;Sore  -LC      What Performance Factors Make the Current Problem(s) WORSE? sitting for long period of time, getting in and out of truck  -LC      What Performance Factors Make the Current Problem(s) BETTER? ice, when pain is severe  -LC      Is your sleep disturbed? No  -LC      Difficulties with ADL's? walking after prolonged sitting, getting in and out of truck  -LC      Fall Risk Assessment    Any falls in the past year: Yes  -LC      Number of falls reported in the last 12 months 1  -LC      Factors that contributed to the fall: Tripped  -LC      Daily Activities    Primary Language English  -LC      Pt Participated in POC and Goals Yes  -LC      Safety    Are you being hurt, hit, or frightened by anyone at home or in your life? No  -LC      Are you being neglected by a caregiver No  -LC        User Key  (r) = Recorded By, (t) = Taken By, (c) = Cosigned By    Initials Name Provider Type    VICTORINA Leigh, PT DPT Physical Therapist                PT Ortho       09/01/17 1000    Left Knee    Extension/Flexion ROM Details 5-125  -LC    Right Knee    Extension/Flexion ROM Details   -LC    Left Hip    Hip Flexion Gross Movement (4/5) good  -LC    Hip ABduction Gross Movement (4/5) good  -LC    Hip Ext (Lat) Rotation Gross Movement (4/5) good  -LC    Right Hip    Hip Flexion Gross Movement (4/5) good  -LC    Hip ABduction Gross Movement (4/5) good  -LC    Hip Ext (Lat) Rotation Gross Movement (4/5) good  -LC    Left Knee    Knee  Extension Gross Movement (4/5) good  -LC    Knee Flexion Gross Movement (4/5) good  -LC    Right Knee    Knee Extension Gross Movement (4/5) good  -LC    Knee Flexion Gross Movement (4/5) good  -LC      User Key  (r) = Recorded By, (t) = Taken By, (c) = Cosigned By    Initials Name Provider Type    VICTORINA Leigh, PT DPT Physical Therapist                            Therapy Education       09/01/17 1104          Therapy Education    Given HEP;Symptoms/condition management;Pain management  -      Program New  -      How Provided Verbal;Demonstration;Written  -      Provided to Patient  -LC      Level of Understanding Teach back education performed;Verbalized;Demonstrated  -        User Key  (r) = Recorded By, (t) = Taken By, (c) = Cosigned By    Initials Name Provider Type    VICTORINA Leigh, PT DPT Physical Therapist                PT OP Goals       09/01/17 1100       PT Short Term Goals    STG 1 Pt will be independent with HEP for increasing R knee AROM and strength  -     STG 1 Progress New  -     STG 2 Pt will achieve R knee AROM 5-125  -LC     STG 2 Progress New  -LC     STG 3 Pt will report 15% improvement on Knee outcome survey.  -LC     STG 3 Progress New  -LC     STG 4 Pt will report pain no greater than 2/10 when he initially starts moving after sitting for a long period of time.  -     STG 4 Progress New  -     Time Calculation    PT Goal Re-Cert Due Date 10/02/17  -       User Key  (r) = Recorded By, (t) = Taken By, (c) = Cosigned By    Initials Name Provider Type    VICTORINA Leigh, PT JAKET Physical Therapist                PT Assessment/Plan       09/01/17 1113       PT Assessment    Functional Limitations Impaired gait;Impaired locomotion;Limitation in home management;Limitations in community activities;Performance in leisure activities;Limitations in functional capacity and performance  -     Impairments Balance;Range of motion;Muscle strength;Pain;Edema;Impaired  flexibility;Gait  -     Assessment Comments Pt is a 67 y.o. male who presents with hx of chronic R knee pain and stiffness due to primary OA. He was previously attending PT fall of 2016, but went through a move and stopped doing his HEP. he is returning to PT to begin therapy again and for reeducation on HEP. He has R knee AROM . He has RLE MMT grossly 4/5. Pt ambulates with genu valgum and shuffling steps due to knee extension contractures. He was educated on HEP and issued written copy. Emphasized hamstring stretching to improve R knee mobillity and knee extension.  -     Please refer to paper survey for additional self-reported information Yes  -     Rehab Potential Good  -     Patient/caregiver participated in establishment of treatment plan and goals Yes  -     PT Plan    PT Frequency 2x/week  -     Predicted Duration of Therapy Intervention (days/wks) 4 weeks  -     Planned CPT's? PT EVAL LOW COMPLEXITY: 88189;PT RE-EVAL: 91258;PT NEUROMUSC RE-EDUCATION EA 15 MIN: 03760;PT MANUAL THERAPY EA 15 MIN: 60854;PT THER PROC EA 15 MIN: 41555;PT THER ACT EA 15 MIN: 98833  -     Physical Therapy Interventions (Optional Details) stretching;strengthening;ROM (Range of Motion);manual therapy techniques;joint mobilization;home exercise program;patient/family education  -     PT Plan Comments Pt requires skilled therapy to improve functional AROM and functional strength of RLE to improve activity tolerance and safety with ambulation.  -       User Key  (r) = Recorded By, (t) = Taken By, (c) = Cosigned By    Initials Name Provider Type    VICTORINA Leigh PT DPT Physical Therapist                  Exercises       09/01/17 1100          Exercise 1    Exercise Name 1 see chart for exercises performed  -        User Key  (r) = Recorded By, (t) = Taken By, (c) = Cosigned By    Initials Name Provider Type    VICTORINA Leigh PT DPT Physical Therapist                              Outcome Measures        09/01/17 1100          Knee Outcome Score    Knee Outcome Score Comments 56%  -LC      Functional Assessment    Outcome Measure Options Knee Outcome Score- ADL  -LC        User Key  (r) = Recorded By, (t) = Taken By, (c) = Cosigned By    Initials Name Provider Type    LC Misael Leigh, PT DPT Physical Therapist            Time Calculation:   Start Time: 1045  Stop Time: 1130  Time Calculation (min): 45 min  Total Timed Code Minutes- PT: 45 minute(s)     Therapy Charges for Today     Code Description Service Date Service Provider Modifiers Qty    18680640095 HC PT EVAL LOW COMPLEXITY 1 9/1/2017 Misael Leigh, PT DPT GP 1    79818127781 HC PT THER PROC EA 15 MIN 9/1/2017 Misael Leigh, PT DPT GP 1    13853505983 HC PT MANUAL THERAPY EA 15 MIN 9/1/2017 Misael Leigh, PT DPT GP 1    30974942676 HC PT MOBILITY CURRENT 9/1/2017 Misael Leigh PT DPT GP, CK 1    16496467465 HC PT MOBILITY PROJECTED 9/1/2017 Misael Leigh PT DPT GP, CJ 1          PT G-Codes  PT Professional Judgement Used?: Yes  Outcome Measure Options: Knee Outcome Score- ADL  Score: 56%  Functional Limitation: Mobility: Walking and moving around  Mobility: Walking and Moving Around Current Status (): At least 40 percent but less than 60 percent impaired, limited or restricted  Mobility: Walking and Moving Around Goal Status (): At least 20 percent but less than 40 percent impaired, limited or restricted         Misael Leigh PT DPT  9/1/2017

## 2017-09-05 ENCOUNTER — TELEPHONE (OUTPATIENT)
Dept: INTERNAL MEDICINE | Age: 67
End: 2017-09-05

## 2017-09-05 NOTE — TELEPHONE ENCOUNTER
Pt wanted to let PCP know he is scheduled for surgery on 10/3/2017 to have toes repaired.  Pt will need a BRIDGE for surgery r/t coumadin. MM

## 2017-09-12 ENCOUNTER — HOSPITAL ENCOUNTER (OUTPATIENT)
Dept: PHYSICAL THERAPY | Facility: HOSPITAL | Age: 67
Setting detail: THERAPIES SERIES
Discharge: HOME | End: 2017-09-12

## 2017-09-12 DIAGNOSIS — M25.561 RIGHT KNEE PAIN, UNSPECIFIED CHRONICITY: Primary | ICD-10-CM

## 2017-09-12 PROCEDURE — 97110 THERAPEUTIC EXERCISES: CPT | Performed by: PHYSICAL THERAPIST

## 2017-09-12 NOTE — THERAPY TREATMENT NOTE
Outpatient Physical Therapy Ortho Treatment Note  [facility]     Patient Name: Jose Hurtado  : 1950  MRN: [id number]  Today's Date: 2017      Visit Date: 2017    Visit Dx:    ICD-10-CM ICD-9-CM   1. Right knee pain, unspecified chronicity M25.561 719.46       Patient Active Problem List   Diagnosis   • Adenocarcinoma of esophagus   • Adenomatous polyp of colon   • Anemia   • Duodenal ulcer   • Insomnia   • Malignant neoplasm of prostate   • RLS (restless legs syndrome)   • Thrombophlebitis of deep veins of lower extremity   • Ventral hernia without obstruction or gangrene   • Incisional hernia, without obstruction or gangrene   • Hernia, incisional   • Osteoarthrosis, localized, primary, knee   • BPH (benign prostatic hyperplasia)   • Stasis dermatitis   • Medicare annual wellness visit, subsequent   • Reactive depression   • DVT (deep venous thrombosis)   • Hammer toes of both feet        Past Medical History:   Diagnosis Date   • Anti-phospholipid syndrome     On lifelong anticoagulation therapy   • Atrial fibrillation     HAD IT FOR 3 HR AFTER SX ON ESOPHAGUS  AND CONVERTED BACK ON OWN   • Benign prostatic hypertrophy    • Bladder disorder     LEAKAGE   ON MED   • Clotting disorder    • Community acquired pneumonia    • Cough     Seeing pulmonary medicine 2016   • Deep venous thrombosis 2008   • Esophageal carcinoma 2014    had chemo and radiation prior surgery   • Esophageal reflux    • Fatigue    • GERD (gastroesophageal reflux disease)    • Hemorrhoids    • Hypertension    • Long-term (current) use of anticoagulants, INR goal 2.0-3.0    • Malignant neoplasm of prostate    • Pneumonia    • Radiation    • Restless legs syndrome    • Squamous carcinoma     on the head   • Stasis dermatitis    • Weight loss         Past Surgical History:   Procedure Laterality Date   • APPENDECTOMY     • BRONCHOSCOPY      (Diagnostic)   • COLONOSCOPY  12/15/2014    Complete /  Description: EH, IH, torts, stool, follow-up colonoscopy due in 5 years.   • COLONOSCOPY N/A 6/13/2017    Procedure: COLONOSCOPY TO CECUM AND INTO TERMINAL ILEUM;  Surgeon: Mulugeta BLACKWELL MD;  Location: [facility] ENDOSCOPY;  Service:    • ENDOSCOPY N/A 6/13/2017    Procedure: ESOPHAGOGASTRODUODENOSCOPY WITH COLD BIOPSY;  Surgeon: Lake Gonzalez MD;  Location: [facility] ENDOSCOPY;  Service:    • ESOPHAGECTOMY      April 2015, stage IIB esophageal carcinoma, sub-total resection.   • ESOPHAGECTOMY      Esophagectomy Subtotal [place] Joe Procedure   • EXCISION LESION  08/2012    Removal of Squamous Cell CA on Head   • HAMMER TOE REPAIR  09/2014    Hammertoe Operation (Each Toe), 10/2014   • HERNIA REPAIR     • JEJUNOSTOMY      Laparoscopic   • PROSTATE SURGERY     • PROSTATECTOMY  2010   • PYLOROPLASTY     • SPINAL FUSION  02/1998    C6-C7   • TONSILLECTOMY     • UPPER GASTROINTESTINAL ENDOSCOPY  12/15/2014    [place] Grade D esophagitis, Pardo's, HH, multiple duodenal ulcers   • UPPER GASTROINTESTINAL ENDOSCOPY      (Therapeutic)   • VENTRAL/INCISIONAL HERNIA REPAIR N/A 4/14/2016    Procedure: VENTRAL/INCISIONAL HERNIA REPAIR, open, with mesh, and component separation;  Surgeon: Darren Rivas MD;  Location: [facility] MAIN OR;  Service:              PT Ortho       09/12/17 1000    Subjective Comments    Subjective Comments Pt reports 75% compliance with HEP. He reports that he tries to do some of HEP daily. He continues to have difficulty ambulating due to knee flexion with standing. Pt demonstrates increased strength wtih therex.  -LC    Subjective Pain    Able to rate subjective pain? yes  -LC    Pre-Treatment Pain Level 0  -LC    Post-Treatment Pain Level 0  -LC      User Key  (r) = Recorded By, (t) = Taken By, (c) = Cosigned By    Initials Name Provider Type    VICTORINA Leigh, PT DPT Physical Therapist                            PT Assessment/Plan       09/12/17 1033       PT Assessment    Functional  "Limitations Impaired gait;Impaired locomotion;Limitation in home management;Limitations in community activities;Performance in leisure activities;Limitations in functional capacity and performance  -     Impairments Balance;Range of motion;Muscle strength;Pain;Edema;Impaired flexibility;Gait  -LC     Assessment Comments Pt reports 75% compliance with HEP. He reports that he tries to perform therex daily, but does not always get to all of his exercises. He continues to ambulate with B knees in flexion. He tolerated new therex well today and his HEP was progressed with written copy issued.  -LC     PT Plan    PT Plan Comments Pt requires skilled interventions to include therex, manual, strengthening, stretching and ROM.  -LC       User Key  (r) = Recorded By, (t) = Taken By, (c) = Cosigned By    Initials Name Provider Type    VICTORINA Leigh, PT DPT Physical Therapist                    Exercises       09/12/17 1000          Subjective Comments    Subjective Comments Pt reports 75% compliance with HEP. He reports that he tries to do some of HEP daily. He continues to have difficulty ambulating due to knee flexion with standing. Pt demonstrates increased strength wtih therex.  -LC      Subjective Pain    Able to rate subjective pain? yes  -LC      Pre-Treatment Pain Level 0  -LC      Post-Treatment Pain Level 0  -LC      Exercise 1    Exercise Name 1 Nu Step  -LC      Time (Minutes) 1 5  -LC      Additional Comments level 5  -LC      Exercise 2    Exercise Name 2 Step Up 8\"  -LC      Sets 2 3  -LC      Reps 2 8  -LC      Additional Comments forward, side RLE  -LC      Exercise 3    Exercise Name 3 Standing Hip EXT and ABD  -LC      Sets 3 3  -LC      Reps 3 8  -LC      Additional Comments GTB, BLE  -LC      Exercise 4    Exercise Name 4 LAQ  -LC      Sets 4 3  -LC      Reps 4 8  -LC      Additional Comments 5#s  -LC      Exercise 5    Exercise Name 5 SLR  -LC      Sets 5 3  -LC      Reps 5 8  -LC      Additional " Comments 5#s  -LC      Exercise 6    Exercise Name 6 bridge on ball  -LC      Sets 6 3  -LC      Reps 6 8  -LC      Additional Comments supine  -LC      Exercise 7    Exercise Name 7 HS curl on ball  -LC      Sets 7 3  -LC      Reps 7 8  -LC      Additional Comments RLE, supine  -LC        User Key  (r) = Recorded By, (t) = Taken By, (c) = Cosigned By    Initials Name Provider Type    VICTORINA Leigh, PT DPT Physical Therapist                               PT OP Goals       09/12/17 1000       PT Short Term Goals    STG 1 Pt will be independent with HEP for increasing R knee AROM and strength  -LC     STG 1 Progress Progressing  -LC     STG 2 Pt will achieve R knee AROM 5-125  -LC     STG 2 Progress Progressing  -LC     STG 3 Pt will report 15% improvement on Knee outcome survey.  -LC     STG 3 Progress Progressing  -LC     STG 4 Pt will report pain no greater than 2/10 when he initially starts moving after sitting for a long period of time.  -LC     STG 4 Progress Progressing  -LC       User Key  (r) = Recorded By, (t) = Taken By, (c) = Cosigned By    Initials Name Provider Type    VICTORINA Leigh, PT DPT Physical Therapist                Therapy Education       09/12/17 1032          Therapy Education    Given HEP;Symptoms/condition management;Pain management  -      Program Progressed  -      How Provided Verbal;Demonstration;Written  -LC      Provided to Patient  -LC      Level of Understanding Teach back education performed;Verbalized;Demonstrated  -        User Key  (r) = Recorded By, (t) = Taken By, (c) = Cosigned By    Initials Name Provider Type    VICTORINA Leigh, PT DPT Physical Therapist                Time Calculation:   Start Time: 0945  Stop Time: 1030  Time Calculation (min): 45 min  Total Timed Code Minutes- PT: 45 minute(s)    Therapy Charges for Today     Code Description Service Date Service Provider Modifiers Qty    40648864002 HC PT THER PROC EA 15 MIN 9/12/2017 Misael Leigh, PT  DPT GP 3                    Misael Leigh, PT DPT  9/12/2017

## 2017-09-14 ENCOUNTER — APPOINTMENT (OUTPATIENT)
Dept: PHYSICAL THERAPY | Facility: HOSPITAL | Age: 67
End: 2017-09-14

## 2017-09-15 ENCOUNTER — LAB (OUTPATIENT)
Dept: LAB | Facility: HOSPITAL | Age: 67
End: 2017-09-15

## 2017-09-15 ENCOUNTER — HOSPITAL ENCOUNTER (OUTPATIENT)
Dept: PET IMAGING | Facility: HOSPITAL | Age: 67
Discharge: HOME | End: 2017-09-15
Attending: INTERNAL MEDICINE | Admitting: INTERNAL MEDICINE

## 2017-09-15 DIAGNOSIS — C15.9 ADENOCARCINOMA OF ESOPHAGUS (HCC): ICD-10-CM

## 2017-09-15 LAB
ALBUMIN SERPL-MCNC: 3.7 G/DL (ref 3.5–5.2)
ALBUMIN/GLOB SERPL: 1.1 G/DL (ref 1.1–2.4)
ALP SERPL-CCNC: 101 U/L (ref 38–116)
ALT SERPL W P-5'-P-CCNC: 19 U/L (ref 0–41)
ANION GAP SERPL CALCULATED.3IONS-SCNC: 11.8 MMOL/L
AST SERPL-CCNC: 23 U/L (ref 0–40)
BASOPHILS # BLD AUTO: 0.02 10*3/MM3 (ref 0–0.1)
BASOPHILS NFR BLD AUTO: 0.6 % (ref 0–1.1)
BILIRUB SERPL-MCNC: 0.3 MG/DL (ref 0.1–1.2)
BUN BLD-MCNC: 22 MG/DL (ref 6–20)
BUN/CREAT SERPL: 17.7 (ref 7.3–30)
CALCIUM SPEC-SCNC: 9 MG/DL (ref 8.5–10.2)
CHLORIDE SERPL-SCNC: 97 MMOL/L (ref 98–107)
CO2 SERPL-SCNC: 28.2 MMOL/L (ref 22–29)
CREAT BLD-MCNC: 1.24 MG/DL (ref 0.7–1.3)
CREAT BLDA-MCNC: 1.3 MG/DL (ref 0.6–1.3)
DEPRECATED RDW RBC AUTO: 52.1 FL (ref 37–49)
EOSINOPHIL # BLD AUTO: 0.24 10*3/MM3 (ref 0–0.36)
EOSINOPHIL NFR BLD AUTO: 7.7 % (ref 1–5)
ERYTHROCYTE [DISTWIDTH] IN BLOOD BY AUTOMATED COUNT: 14.3 % (ref 11.7–14.5)
GFR SERPL CREATININE-BSD FRML MDRD: 58 ML/MIN/1.73
GLOBULIN UR ELPH-MCNC: 3.4 GM/DL (ref 1.8–3.5)
GLUCOSE BLD-MCNC: 86 MG/DL (ref 74–124)
HCT VFR BLD AUTO: 36.2 % (ref 40–49)
HGB BLD-MCNC: 11.4 G/DL (ref 13.5–16.5)
IMM GRANULOCYTES # BLD: 0.02 10*3/MM3 (ref 0–0.03)
IMM GRANULOCYTES NFR BLD: 0.6 % (ref 0–0.5)
LYMPHOCYTES # BLD AUTO: 0.68 10*3/MM3 (ref 1–3.5)
LYMPHOCYTES NFR BLD AUTO: 21.9 % (ref 20–49)
MCH RBC QN AUTO: 31.2 PG (ref 27–33)
MCHC RBC AUTO-ENTMCNC: 31.5 G/DL (ref 32–35)
MCV RBC AUTO: 99.2 FL (ref 83–97)
MONOCYTES # BLD AUTO: 0.3 10*3/MM3 (ref 0.25–0.8)
MONOCYTES NFR BLD AUTO: 9.6 % (ref 4–12)
NEUTROPHILS # BLD AUTO: 1.85 10*3/MM3 (ref 1.5–7)
NEUTROPHILS NFR BLD AUTO: 59.6 % (ref 39–75)
NRBC BLD MANUAL-RTO: 0 /100 WBC (ref 0–0)
PLATELET # BLD AUTO: 101 10*3/MM3 (ref 150–375)
PMV BLD AUTO: 11.1 FL (ref 8.9–12.1)
POTASSIUM BLD-SCNC: 4.5 MMOL/L (ref 3.5–4.7)
PROT SERPL-MCNC: 7.1 G/DL (ref 6.3–8)
RBC # BLD AUTO: 3.65 10*6/MM3 (ref 4.3–5.5)
SODIUM BLD-SCNC: 137 MMOL/L (ref 134–145)
WBC NRBC COR # BLD: 3.11 10*3/MM3 (ref 4–10)

## 2017-09-15 PROCEDURE — 36415 COLL VENOUS BLD VENIPUNCTURE: CPT | Performed by: INTERNAL MEDICINE

## 2017-09-15 PROCEDURE — 80053 COMPREHEN METABOLIC PANEL: CPT | Performed by: INTERNAL MEDICINE

## 2017-09-15 PROCEDURE — 71260 CT THORAX DX C+: CPT

## 2017-09-15 PROCEDURE — 85025 COMPLETE CBC W/AUTO DIFF WBC: CPT | Performed by: INTERNAL MEDICINE

## 2017-09-15 PROCEDURE — 0 IOPAMIDOL 61 % SOLUTION: Performed by: INTERNAL MEDICINE

## 2017-09-15 PROCEDURE — 74160 CT ABDOMEN W/CONTRAST: CPT

## 2017-09-15 PROCEDURE — 82565 ASSAY OF CREATININE: CPT

## 2017-09-15 PROCEDURE — 0 DIATRIZOATE MEGLUMINE & SODIUM PER 1 ML: Performed by: INTERNAL MEDICINE

## 2017-09-15 RX ADMIN — DIATRIZOATE MEGLUMINE AND DIATRIZOATE SODIUM 30 ML: 660; 100 LIQUID ORAL; RECTAL at 07:33

## 2017-09-15 RX ADMIN — IOPAMIDOL 85 ML: 612 INJECTION, SOLUTION INTRAVENOUS at 08:16

## 2017-09-19 ENCOUNTER — HOSPITAL ENCOUNTER (OUTPATIENT)
Dept: PHYSICAL THERAPY | Facility: HOSPITAL | Age: 67
Setting detail: THERAPIES SERIES
Discharge: HOME | End: 2017-09-19

## 2017-09-19 DIAGNOSIS — M25.561 RIGHT KNEE PAIN, UNSPECIFIED CHRONICITY: Primary | ICD-10-CM

## 2017-09-19 PROCEDURE — 97140 MANUAL THERAPY 1/> REGIONS: CPT | Performed by: PHYSICAL THERAPIST

## 2017-09-19 PROCEDURE — 97110 THERAPEUTIC EXERCISES: CPT | Performed by: PHYSICAL THERAPIST

## 2017-09-19 NOTE — THERAPY TREATMENT NOTE
Outpatient Physical Therapy Ortho Treatment Note  [facility]     Patient Name: Jose Hurtado  : 1950  MRN: [id number]  Today's Date: 2017      Visit Date: 2017    Visit Dx:    ICD-10-CM ICD-9-CM   1. Right knee pain, unspecified chronicity M25.561 719.46       Patient Active Problem List   Diagnosis   • Adenocarcinoma of esophagus   • Adenomatous polyp of colon   • Anemia   • Duodenal ulcer   • Insomnia   • Malignant neoplasm of prostate   • RLS (restless legs syndrome)   • Thrombophlebitis of deep veins of lower extremity   • Ventral hernia without obstruction or gangrene   • Incisional hernia, without obstruction or gangrene   • Hernia, incisional   • Osteoarthrosis, localized, primary, knee   • BPH (benign prostatic hyperplasia)   • Stasis dermatitis   • Medicare annual wellness visit, subsequent   • Reactive depression   • DVT (deep venous thrombosis)   • Hammer toes of both feet        Past Medical History:   Diagnosis Date   • Anti-phospholipid syndrome     On lifelong anticoagulation therapy   • Atrial fibrillation     HAD IT FOR 3 HR AFTER SX ON ESOPHAGUS  AND CONVERTED BACK ON OWN   • Benign prostatic hypertrophy    • Bladder disorder     LEAKAGE   ON MED   • Clotting disorder    • Community acquired pneumonia    • Cough     Seeing pulmonary medicine 2016   • Deep venous thrombosis ,     Left lower extremity   • Esophageal carcinoma 2014    had chemo and radiation prior surgery   • Esophageal reflux    • Fatigue    • GERD (gastroesophageal reflux disease)    • Hemorrhoids    • HH (hiatus hernia)    • History of nephrolithiasis    • Hypertension    • Long-term (current) use of anticoagulants, INR goal 2.0-3.0    • Malignant neoplasm of prostate    • Pancreatitis    • Pneumonia    • Radiation    • Restless legs syndrome    • Squamous carcinoma     on the head   • Stasis dermatitis    • Weight loss         Past Surgical History:   Procedure Laterality Date   •  APPENDECTOMY  1950   • BRONCHOSCOPY      (Diagnostic)   • CATARACT EXTRACTION Bilateral 2014   • COLONOSCOPY  12/15/2014    Complete / Description: EH, IH, torts, stool, follow-up colonoscopy due in 5 years.   • COLONOSCOPY N/A 6/13/2017    Procedure: COLONOSCOPY TO CECUM AND INTO TERMINAL ILEUM;  Surgeon: Mulugeta BLACKWELL MD;  Location: [facility] ENDOSCOPY;  Service:    • ENDOSCOPY N/A 6/13/2017    Procedure: ESOPHAGOGASTRODUODENOSCOPY WITH COLD BIOPSY;  Surgeon: Lake Gonzalez MD;  Location: [facility] ENDOSCOPY;  Service:    • ESOPHAGECTOMY      April 2015, stage IIB esophageal carcinoma, sub-total resection.   • ESOPHAGECTOMY      Esophagectomy Subtotal [place] [facility] Procedure   • EXCISION LESION  08/2012    Removal of Squamous Cell CA on Head   • HAMMER TOE REPAIR  09/2014    Hammertoe Operation (Each Toe), 10/2014   • HERNIA REPAIR     • JEJUNOSTOMY      Laparoscopic   • KNEE SURGERY Bilateral 1967, 1973, 1981   • PROSTATE SURGERY     • PROSTATECTOMY  2010   • PYLOROPLASTY     • SPINAL FUSION  02/1998    C6-C7   • TONSILLECTOMY  1955   • UPPER GASTROINTESTINAL ENDOSCOPY  12/15/2014    [place] Grade D esophagitis, Pardo's, HH, multiple duodenal ulcers   • UPPER GASTROINTESTINAL ENDOSCOPY      (Therapeutic)   • VENTRAL/INCISIONAL HERNIA REPAIR N/A 4/14/2016    Procedure: VENTRAL/INCISIONAL HERNIA REPAIR, open, with mesh, and component separation;  Surgeon: Darren Rivas MD;  Location: [facility] MAIN OR;  Service:              PT Ortho       09/19/17 0900    Subjective Comments    Subjective Comments Pt says that he continues to have difficulty with R knee terminal extension. He reports that his RLE feels stronger and he has less difficulty while walking.  -LC    Right Knee    Extension/Flexion ROM Details   -      User Key  (r) = Recorded By, (t) = Taken By, (c) = Cosigned By    Initials Name Provider Type    VICTORINA Leigh PT DPT Physical Therapist                            PT Assessment/Plan      "  09/19/17 1003       PT Assessment    Functional Limitations Impaired gait;Impaired locomotion;Limitation in home management;Limitations in community activities;Performance in leisure activities;Limitations in functional capacity and performance  -     Impairments Balance;Range of motion;Muscle strength;Pain;Edema;Impaired flexibility;Gait  -LC     Assessment Comments Pt performed AROM R knee  today. He demonstrates RLE MMT grossly 5/5. pt reports that he is walking with less difficulty and pain, but he continues to have difficulty with getting R knee into terminal extension while walking. Pt was educated on pairing hamstring and extension stretches followed by immediately performing quad strengthening and exercises to encourage extension.  -LC     PT Plan    PT Plan Comments Pt requires continued skilled therapy to improve R knee AROM and to increase functional mobility to allow for pain free leisure activities.  -LC       User Key  (r) = Recorded By, (t) = Taken By, (c) = Cosigned By    Initials Name Provider Type    VICTORINA Leigh, PT DPT Physical Therapist                    Exercises       09/19/17 0900          Subjective Comments    Subjective Comments Pt says that he continues to have difficulty with R knee terminal extension. He reports that his RLE feels stronger and he has less difficulty while walking.  -LC      Exercise 1    Exercise Name 1 Nu Step  -LC      Time (Minutes) 1 5  -LC      Additional Comments level 5  -LC      Exercise 2    Exercise Name 2 Step Up 8\"  -LC      Sets 2 3  -LC      Reps 2 8  -LC      Additional Comments forward, side RLE  -LC      Exercise 3    Exercise Name 3 Standing Hip EXT and ABD  -LC      Sets 3 3  -LC      Reps 3 8  -LC      Exercise 4    Exercise Name 4 LAQ  -LC      Sets 4 3  -LC      Reps 4 8  -LC      Additional Comments 5#s  -LC      Exercise 5    Exercise Name 5 SLR  -LC      Sets 5 3  -LC      Reps 5 8  -LC      Additional Comments 5#s  -LC      " Exercise 6    Exercise Name 6 bridge on ball  -LC      Sets 6 3  -LC      Reps 6 8  -LC      Additional Comments supine  -LC      Exercise 7    Exercise Name 7 HS curl on ball  -LC      Sets 7 3  -LC      Reps 7 8  -LC      Additional Comments RLE supine  -LC      Exercise 8    Exercise Name 8 TKE  -LC      Sets 8 3  -LC      Reps 8 8  -LC      Additional Comments BTB  -LC        User Key  (r) = Recorded By, (t) = Taken By, (c) = Cosigned By    Initials Name Provider Type    VICTORINA Leigh, PT DPT Physical Therapist                        Manual Rx (last 36 hours)      Manual Treatments       09/19/17 0900          Manual Rx 1    Manual Rx 1 Location R knee   -LC      Manual Rx 1 Type hamstring stretch, quad stretch, PROM  -LC      Manual Rx 1 Duration 15 min  -LC        User Key  (r) = Recorded By, (t) = Taken By, (c) = Cosigned By    Initials Name Provider Type    VICTORINA Leigh, PT DPT Physical Therapist                PT OP Goals       09/19/17 1000       PT Short Term Goals    STG 1 Pt will be independent with HEP for increasing R knee AROM and strength  -LC     STG 1 Progress Progressing  -LC     STG 2 Pt will achieve R knee AROM 5-125  -LC     STG 2 Progress Progressing  -LC     STG 2 Progress Comments   -LC     STG 3 Pt will report 15% improvement on Knee outcome survey.  -LC     STG 3 Progress Progressing  -LC     STG 4 Pt will report pain no greater than 2/10 when he initially starts moving after sitting for a long period of time.  -LC     STG 4 Progress Met  -LC       User Key  (r) = Recorded By, (t) = Taken By, (c) = Cosigned By    Initials Name Provider Type    VICTORINA Leigh, PT DPT Physical Therapist                Therapy Education       09/19/17 1002          Therapy Education    Given HEP;Symptoms/condition management;Pain management  -LC      Program Reinforced  -LC      How Provided Verbal;Demonstration  -LC      Provided to Patient  -LC      Level of Understanding Teach back  education performed;Verbalized;Demonstrated  -        User Key  (r) = Recorded By, (t) = Taken By, (c) = Cosigned By    Initials Name Provider Type    VICTORINA Leigh, PT DPT Physical Therapist                Time Calculation:   Start Time: 0900  Stop Time: 0945  Time Calculation (min): 45 min  Total Timed Code Minutes- PT: 45 minute(s)    Therapy Charges for Today     Code Description Service Date Service Provider Modifiers Qty    30781528209  PT THER PROC EA 15 MIN 9/19/2017 Misael Leigh, PT DPT GP 2    [id number]  PT MANUAL THERAPY EA 15 MIN 9/19/2017 Misael Leigh, PT DPT GP 1                    Misael Leigh, PT DPT  9/19/2017

## 2017-09-21 ENCOUNTER — HOSPITAL ENCOUNTER (OUTPATIENT)
Dept: PHYSICAL THERAPY | Facility: HOSPITAL | Age: 67
Setting detail: THERAPIES SERIES
Discharge: HOME | End: 2017-09-21

## 2017-09-21 DIAGNOSIS — M25.561 RIGHT KNEE PAIN, UNSPECIFIED CHRONICITY: Primary | ICD-10-CM

## 2017-09-21 PROCEDURE — 97140 MANUAL THERAPY 1/> REGIONS: CPT | Performed by: PHYSICAL THERAPIST

## 2017-09-21 PROCEDURE — 97110 THERAPEUTIC EXERCISES: CPT | Performed by: PHYSICAL THERAPIST

## 2017-09-21 NOTE — THERAPY TREATMENT NOTE
Outpatient Physical Therapy Ortho Treatment Note  [facility]     Patient Name: Jose Hurtado  : 1950  MRN: [id number]  Today's Date: 2017      Visit Date: 2017    Visit Dx:    ICD-10-CM ICD-9-CM   1. Right knee pain, unspecified chronicity M25.561 719.46       Patient Active Problem List   Diagnosis   • Adenocarcinoma of esophagus   • Adenomatous polyp of colon   • Anemia   • Duodenal ulcer   • Insomnia   • Malignant neoplasm of prostate   • RLS (restless legs syndrome)   • Thrombophlebitis of deep veins of lower extremity   • Ventral hernia without obstruction or gangrene   • Incisional hernia, without obstruction or gangrene   • Hernia, incisional   • Osteoarthrosis, localized, primary, knee   • BPH (benign prostatic hyperplasia)   • Stasis dermatitis   • Medicare annual wellness visit, subsequent   • Reactive depression   • DVT (deep venous thrombosis)   • Hammer toes of both feet        Past Medical History:   Diagnosis Date   • Anti-phospholipid syndrome     On lifelong anticoagulation therapy   • Atrial fibrillation     HAD IT FOR 3 HR AFTER SX ON ESOPHAGUS  AND CONVERTED BACK ON OWN   • Benign prostatic hypertrophy    • Bladder disorder     LEAKAGE   ON MED   • Clotting disorder    • Community acquired pneumonia    • Cough     Seeing pulmonary medicine 2016   • Deep venous thrombosis ,     Left lower extremity   • Esophageal carcinoma 2014    had chemo and radiation prior surgery   • Esophageal reflux    • Fatigue    • GERD (gastroesophageal reflux disease)    • Hemorrhoids    • HH (hiatus hernia)    • History of nephrolithiasis    • Hypertension    • Long-term (current) use of anticoagulants, INR goal 2.0-3.0    • Malignant neoplasm of prostate    • Pancreatitis    • Pneumonia    • Radiation    • Restless legs syndrome    • Squamous carcinoma     on the head   • Stasis dermatitis    • Weight loss         Past Surgical History:   Procedure Laterality Date   •  APPENDECTOMY  1950   • BRONCHOSCOPY      (Diagnostic)   • CATARACT EXTRACTION Bilateral 2014   • COLONOSCOPY  12/15/2014    Complete / Description: EH, IH, torts, stool, follow-up colonoscopy due in 5 years.   • COLONOSCOPY N/A 6/13/2017    Procedure: COLONOSCOPY TO CECUM AND INTO TERMINAL ILEUM;  Surgeon: Mulugeta BLACKWELL MD;  Location: [facility] ENDOSCOPY;  Service:    • ENDOSCOPY N/A 6/13/2017    Procedure: ESOPHAGOGASTRODUODENOSCOPY WITH COLD BIOPSY;  Surgeon: Lake Gonzalez MD;  Location: [facility] ENDOSCOPY;  Service:    • ESOPHAGECTOMY      April 2015, stage IIB esophageal carcinoma, sub-total resection.   • ESOPHAGECTOMY      Esophagectomy Subtotal [place] Joe Procedure   • EXCISION LESION  08/2012    Removal of Squamous Cell CA on Head   • HAMMER TOE REPAIR  09/2014    Hammertoe Operation (Each Toe), 10/2014   • HERNIA REPAIR     • JEJUNOSTOMY      Laparoscopic   • KNEE SURGERY Bilateral 1967, 1973, 1981   • PROSTATE SURGERY     • PROSTATECTOMY  2010   • PYLOROPLASTY     • SPINAL FUSION  02/1998    C6-C7   • TONSILLECTOMY  1955   • UPPER GASTROINTESTINAL ENDOSCOPY  12/15/2014    [place] Grade D esophagitis, Pardo's, HH, multiple duodenal ulcers   • UPPER GASTROINTESTINAL ENDOSCOPY      (Therapeutic)   • VENTRAL/INCISIONAL HERNIA REPAIR N/A 4/14/2016    Procedure: VENTRAL/INCISIONAL HERNIA REPAIR, open, with mesh, and component separation;  Surgeon: Darren Rivas MD;  Location: [facility] MAIN OR;  Service:              PT Ortho       09/21/17 0900    Subjective Comments    Subjective Comments Pt reports that HEP is going well and is ambulating with no pain. He says that he thinks he gets the most benefit with improved AROM and decreased pain following hamstring stretches. Pt wants to be instructed on progressed hamstring stretches, to hopefully improve his R knee extension  -LC    Subjective Pain    Able to rate subjective pain? yes  -LC    Pre-Treatment Pain Level 0  -LC    Post-Treatment Pain Level 0   -    Right Knee    Extension/Flexion ROM Details 9-120  -LC      09/19/17 0900    Subjective Comments    Subjective Comments Pt says that he continues to have difficulty with R knee terminal extension. He reports that his RLE feels stronger and he has less difficulty while walking.  -    Right Knee    Extension/Flexion ROM Details   -      User Key  (r) = Recorded By, (t) = Taken By, (c) = Cosigned By    Initials Name Provider Type    VICTORINA Leigh, PT DPT Physical Therapist                            PT Assessment/Plan       09/21/17 0928       PT Assessment    Functional Limitations Impaired gait;Impaired locomotion;Limitation in home management;Limitations in community activities;Performance in leisure activities;Limitations in functional capacity and performance  -     Impairments Balance;Range of motion;Muscle strength;Pain;Edema;Impaired flexibility;Gait  -     Assessment Comments Pt performed R knee AROM 9-120 today. He reports that somedays his right knee feels tighter than others. He was educated on new hamstring stretching techniques to encourage knee extension and was issued written copy of these.  -     PT Plan    PT Plan Comments Pt requires continued skilled therapy to include therex, manual, strengthening, stretching, and ROM .  -       User Key  (r) = Recorded By, (t) = Taken By, (c) = Cosigned By    Initials Name Provider Type    VICTORINA Leigh, PT DPT Physical Therapist                    Exercises       09/21/17 0900          Subjective Comments    Subjective Comments Pt reports that HEP is going well and is ambulating with no pain. He says that he thinks he gets the most benefit with improved AROM and decreased pain following hamstring stretches. Pt wants to be instructed on progressed hamstring stretches, to hopefully improve his R knee extension  -      Subjective Pain    Able to rate subjective pain? yes  -      Pre-Treatment Pain Level 0  -      Post-Treatment  "Pain Level 0  -LC      Exercise 1    Exercise Name 1 Nu Step  -LC      Time (Minutes) 1 5  -LC      Additional Comments level 5  -LC      Exercise 2    Exercise Name 2 Step Up 8\"  -LC      Sets 2 3  -LC      Reps 2 8  -LC      Exercise 3    Exercise Name 3 Standing Hip EXT and ABD  -LC      Sets 3 3  -LC      Reps 3 8  -LC      Additional Comments GTB  -LC      Exercise 4    Exercise Name 4 LAQ  -LC      Sets 4 3  -LC      Reps 4 8  -LC      Additional Comments 5#s  -LC      Exercise 5    Exercise Name 5 SLR  -LC      Sets 5 3  -LC      Reps 5 8  -LC      Additional Comments 5#s  -LC      Exercise 6    Exercise Name 6 bridge on ball  -LC      Sets 6 3  -LC      Reps 6 8  -LC      Exercise 7    Exercise Name 7 HS curl on ball  -LC      Sets 7 3  -LC      Reps 7 8  -LC      Exercise 8    Exercise Name 8 TKE  -LC      Sets 8 3  -LC      Reps 8 8  -LC      Additional Comments BTB  -LC      Exercise 9    Exercise Name 9 standing hamstring stretch  -LC      Reps 9 5  -LC      Time (Seconds) 9 15  -LC      Additional Comments foot propped  -LC      Exercise 10    Exercise Name 10 supine hamstring stretch  -LC      Reps 10 5  -LC      Time (Seconds) 10 15  -LC      Additional Comments strap, hip at 90  -LC        User Key  (r) = Recorded By, (t) = Taken By, (c) = Cosigned By    Initials Name Provider Type    VICTORINA Leigh, PT DPT Physical Therapist                        Manual Rx (last 36 hours)      Manual Treatments       09/21/17 0900          Manual Rx 1    Manual Rx 1 Location R knee   -LC      Manual Rx 1 Type hamstring stretch, quad stretch, PROM  -LC      Manual Rx 1 Duration 15 min  -LC        User Key  (r) = Recorded By, (t) = Taken By, (c) = Cosigned By    Initials Name Provider Type    VICTORINA Leigh, PT DPT Physical Therapist                    Therapy Education       09/21/17 0927          Therapy Education    Given HEP;Symptoms/condition management;Pain management  -LC      Program Reinforced  -LC   "    How Provided Verbal;Demonstration;Written  -LC      Provided to Patient  -LC      Level of Understanding Teach back education performed;Verbalized;Demonstrated  -LC        User Key  (r) = Recorded By, (t) = Taken By, (c) = Cosigned By    Initials Name Provider Type    LC Misael Leigh, PT DPT Physical Therapist                Time Calculation:   Start Time: 0900  Stop Time: 0940  Time Calculation (min): 40 min  Total Timed Code Minutes- PT: 40 minute(s)    Therapy Charges for Today     Code Description Service Date Service Provider Modifiers Qty    [id number]  PT THER PROC EA 15 MIN 9/21/2017 Misael Leigh PT DPT GP 2    [id number] HC PT MANUAL THERAPY EA 15 MIN 9/21/2017 Misael Leigh PT DPT GP 1                    Misael Leigh PT DPT  9/21/2017

## 2017-09-22 ENCOUNTER — APPOINTMENT (OUTPATIENT)
Dept: LAB | Facility: HOSPITAL | Age: 67
End: 2017-09-22

## 2017-09-22 ENCOUNTER — OFFICE VISIT (OUTPATIENT)
Dept: ONCOLOGY | Facility: CLINIC | Age: 67
End: 2017-09-22

## 2017-09-22 VITALS
OXYGEN SATURATION: 96 % | RESPIRATION RATE: 16 BRPM | BODY MASS INDEX: 27.74 KG/M2 | SYSTOLIC BLOOD PRESSURE: 124 MMHG | HEIGHT: 76 IN | HEART RATE: 75 BPM | TEMPERATURE: 98 F | WEIGHT: 227.8 LBS | DIASTOLIC BLOOD PRESSURE: 70 MMHG

## 2017-09-22 DIAGNOSIS — C15.9 ADENOCARCINOMA OF ESOPHAGUS (HCC): Primary | ICD-10-CM

## 2017-09-22 PROCEDURE — G0463 HOSPITAL OUTPT CLINIC VISIT: HCPCS | Performed by: INTERNAL MEDICINE

## 2017-09-22 PROCEDURE — 99215 OFFICE O/P EST HI 40 MIN: CPT | Performed by: INTERNAL MEDICINE

## 2017-09-22 NOTE — PROGRESS NOTES
Subjective .     REASONS FOR FOLLOWUP:  Esophageal cancer    HISTORY OF PRESENT ILLNESS:  The patient is a 67 y.o. year old male  who is here for follow-up with the above-mentioned history.    Eating well.  Has gained some weight.  Denies neuropathy.  Continues to rarely have emesis with eating but states this only occurs every couple of months or so.  Denies pain.    Past Medical History:   Diagnosis Date   • Anti-phospholipid syndrome     On lifelong anticoagulation therapy   • Atrial fibrillation     HAD IT FOR 3 HR AFTER SX ON ESOPHAGUS  AND CONVERTED BACK ON OWN   • Benign prostatic hypertrophy    • Bladder disorder     LEAKAGE   ON MED   • Clotting disorder    • Community acquired pneumonia    • Cough     Seeing pulmonary medicine February 2016   • Deep venous thrombosis 2006, 2008    Left lower extremity   • Esophageal carcinoma 12/31/2014    had chemo and radiation prior surgery   • Esophageal reflux    • Fatigue    • GERD (gastroesophageal reflux disease)    • Hemorrhoids    • HH (hiatus hernia)    • History of nephrolithiasis    • Hypertension    • Long-term (current) use of anticoagulants, INR goal 2.0-3.0    • Malignant neoplasm of prostate    • Pancreatitis 1994   • Pneumonia    • Radiation    • Restless legs syndrome    • Squamous carcinoma     on the head   • Stasis dermatitis    • Weight loss      Past Surgical History:   Procedure Laterality Date   • APPENDECTOMY  1950   • BRONCHOSCOPY      (Diagnostic)   • CATARACT EXTRACTION Bilateral 2014   • COLONOSCOPY  12/15/2014    Complete / Description: EH, IH, torts, stool, follow-up colonoscopy due in 5 years.   • COLONOSCOPY N/A 6/13/2017    Procedure: COLONOSCOPY TO CECUM AND INTO TERMINAL ILEUM;  Surgeon: Mulugeta BLACKWELL MD;  Location: [facility] ENDOSCOPY;  Service:    • ENDOSCOPY N/A 6/13/2017    Procedure: ESOPHAGOGASTRODUODENOSCOPY WITH COLD BIOPSY;  Surgeon: Lake Gonzalez MD;  Location: [facility] ENDOSCOPY;  Service:    • ESOPHAGECTOMY       April 2015, stage IIB esophageal carcinoma, sub-total resection.   • ESOPHAGECTOMY      Esophagectomy Subtotal [place] Procedure   • EXCISION LESION  08/2012    Removal of Squamous Cell CA on Head   • HAMMER TOE REPAIR  09/2014    Hammertoe Operation (Each Toe), 10/2014   • HERNIA REPAIR     • JEJUNOSTOMY      Laparoscopic   • KNEE SURGERY Bilateral 1967, 1973, 1981   • PROSTATE SURGERY     • PROSTATECTOMY  2010   • PYLOROPLASTY     • SPINAL FUSION  02/1998    C6-C7   • TONSILLECTOMY  1955   • UPPER GASTROINTESTINAL ENDOSCOPY  12/15/2014    [place] Grade D esophagitis, Pardo's, HH, multiple duodenal ulcers   • UPPER GASTROINTESTINAL ENDOSCOPY      (Therapeutic)   • VENTRAL/INCISIONAL HERNIA REPAIR N/A 4/14/2016    Procedure: VENTRAL/INCISIONAL HERNIA REPAIR, open, with mesh, and component separation;  Surgeon: Darren Rivas MD;  Location: [facility];  Service:        HEMATOLOGIC/ONCOLOGIC HISTORY:  (History from previous dates can be found in the separate document.)    MEDICATIONS    Current Outpatient Prescriptions:   •  oxybutynin (DITROPAN) 5 MG tablet, Take 5 mg by mouth 2 (Two) Times a Day., Disp: , Rfl:   •  PARoxetine (PAXIL) 20 MG tablet, TAKE 1 TABLET EVERY NIGHT, Disp: 90 tablet, Rfl: 0  •  pramipexole (MIRAPEX) 1.5 MG tablet, TAKE 1 TABLET TWICE DAILY, Disp: 180 tablet, Rfl: 3  •  triamterene-hydrochlorothiazide (MAXZIDE-25) 37.5-25 MG per tablet, Take 2 tablets by mouth Daily., Disp: 60 tablet, Rfl: 3  •  warfarin (COUMADIN) 5 MG tablet, TAKE 1 TABLET EVERY DAY, Disp: 90 tablet, Rfl: 0    ALLERGIES:   No Known Allergies    SOCIAL HISTORY:       Social History     Social History   • Marital status:      Spouse name: Lena   • Number of children: 0   • Years of education: College     Occupational History   •  Self-Employed   •  Retired     Social History Main Topics   • Smoking status: Former Smoker     Packs/day: 2.00     Years: 2.00     Types: Cigarettes     Quit date:  "1974   • Smokeless tobacco: Never Used   • Alcohol use 1.8 oz/week     1 Glasses of wine, 1 Cans of beer, 1 Shots of liquor per week      Comment: occassionally   • Drug use: No   • Sexual activity: Not on file     Other Topics Concern   • Not on file     Social History Narrative    self-employed          FAMILY HISTORY:  Family History   Problem Relation Age of Onset   • Cerebral aneurysm Mother      cerebral artery aneurysm   • Prostate cancer Brother 68   • Malig Hyperthermia Neg Hx        REVIEW OF SYSTEMS:    GENERAL: No change in appetite or weight;   No fevers, chills, sweats.    SKIN: No nonhealing lesions.   No rashes.  HEME/LYMPH: No easy bruising, bleeding.   No swollen nodes.   EYES: No vision changes or diplopia.   ENT: No tinnitus, hearing loss, gum bleeding, epistaxis, hoarseness or dysphagia.   RESPIRATORY: No cough, shortness of breath, hemoptysis or wheezing.   CVS: No chest pain, palpitations, orthopnea, dyspnea on exertion or PND.   GI: see HPI   : No lower tract obstructive symptoms, dysuria or hematuria.   MUSCULOSKELETAL: No bone pain.  No joint stiffness.   NEUROLOGICAL: No global weakness, loss of consciousness or seizures.   PSYCHIATRIC: No increased nervousness, mood changes or depression.          Objective    Vitals:    09/22/17 1050   BP: 124/70   Pulse: 75   Resp: 16   Temp: 98 °F (36.7 °C)   TempSrc: Oral   SpO2: 96%   Weight: 227 lb 12.8 oz (103 kg)   Height: 75.59\" (192 cm)  Comment: new height   PainSc: 0-No pain     Current Status 9/22/2017   ECOG score 0      PHYSICAL EXAM:    GENERAL:  Well-developed, well-nourished in no acute distress.   SKIN:  Warm, dry without rashes, purpura or petechiae.  HEAD:  Normocephalic.  EYES:  Pupils equal, round and reactive to light.  EOMs intact.  Conjunctivae normal.  EARS:  Hearing intact.  NOSE:  Septum midline.  No excoriations or nasal discharge.  MOUTH:  Tongue is well-papillated; no stomatitis or ulcers.  Lips normal.  THROAT:  " Oropharynx without lesions or exudates.  NECK:  Supple with good range of motion; no thyromegaly or masses, no JVD.  LYMPHATICS:  No cervical, supraclavicular, axillary or inguinal adenopathy.  CHEST:  Lungs clear to percussion and auscultation. Good airflow.  CARDIAC:  Regular rate and rhythm without murmurs, rubs or gallops. Normal S1,S2.  ABDOMEN:  Soft, nontender with no organomegaly or masses.  EXTREMITIES:  No clubbing, cyanosis or edema.  NEUROLOGICAL:  Cranial Nerves II-XII grossly intact.  No focal neurological deficits.  PSYCHIATRIC:  Normal affect and mood.      RECENT LABS:        WBC   Date/Time Value Ref Range Status   09/15/2017 08:00 AM 3.11 (L) 4.00 - 10.00 10*3/mm3 Final     Hemoglobin   Date/Time Value Ref Range Status   09/15/2017 08:00 AM 11.4 (L) 13.5 - 16.5 g/dL Final     Platelets   Date/Time Value Ref Range Status   09/15/2017 08:00  (L) 150 - 375 10*3/mm3 Final       Assessment/Plan     ASSESSMENT:  Adenocarcinoma of esophagus  - CBC & Differential  - Comprehensive Metabolic Panel  - CT Chest With Contrast  - CT Abdomen Pelvis With Contrast    1.  Esophageal adenocarcinoma. Initially T2N0M0. After neoadjuvant carboplatin/Taxol with radiation, achieved a pathologic CR at resection, 4/24/2015.   As per NCCN guidelines, plan CAT scans every 6 months x1 year, then every 6 to 9 months on years 2 and years 3. Defer any surveillance EGDs to Dr. Gonzalez if he feels they are appropriate.   Also as per NCCN guidelines, plan H and P every 3 to 6 months on years 1 and years 2, then every 6 to 12 months' time on years 3 through years 5 and then annually.   Today I reviewed recent CT from 9/15/17 with him which shows no evidence of recurrence.  Images personally reviewed by me.  He appears to remain in remission.  EGD 6/13/17 by Dr. Gonzalez: No evidence of recurrence.    2. Recurrent DVT, prior to cancer diagnosis.  Dr. George Mooney manages his Coumadin.      3.  Pancytopenia. He had a white blood cell  count in the upper 3s and a hemoglobin in the upper 12s with a normal platelet count prior to beginning chemotherapy. We will monitor this.   Numbers basically stable.    4.  Persistent cough.  He did not complain of this today.  He has seen Dr. Maher Sayied for this.      5.  Left lower lobe lung scarring.  Unchanged through  November 2015.      6.  Emesis occurring 5 hours after eating on average once every month or 2.  No nausea otherwise.  Denies dysphagia or odynophagia.       PLAN:  · M.D. in 6 months with CAT scan chest abdomen pelvis with contrast and CBC CMP 1 week prior (this will be his last plan CT scan, as it will complete 3 years of CT followup since surgery).  · He does not have a port.    Wife assisted with history.

## 2017-09-25 ENCOUNTER — TELEPHONE (OUTPATIENT)
Dept: INTERNAL MEDICINE | Age: 67
End: 2017-09-25

## 2017-09-25 NOTE — TELEPHONE ENCOUNTER
Pt called asking for instructions for Lovenox bridge due to him having hammertoe surgery on 10.03.17 by Dr. Lira.

## 2017-09-26 ENCOUNTER — HOSPITAL ENCOUNTER (OUTPATIENT)
Dept: PHYSICAL THERAPY | Facility: HOSPITAL | Age: 67
Setting detail: THERAPIES SERIES
Discharge: HOME | End: 2017-09-26

## 2017-09-26 DIAGNOSIS — M25.561 RIGHT KNEE PAIN, UNSPECIFIED CHRONICITY: Primary | ICD-10-CM

## 2017-09-26 PROCEDURE — 97110 THERAPEUTIC EXERCISES: CPT | Performed by: PHYSICAL THERAPIST

## 2017-09-26 PROCEDURE — 97140 MANUAL THERAPY 1/> REGIONS: CPT | Performed by: PHYSICAL THERAPIST

## 2017-09-26 NOTE — THERAPY TREATMENT NOTE
Outpatient Physical Therapy Ortho Treatment Note  [facility]     Patient Name: Jose Hurtado  : 1950  MRN: [id number]  Today's Date: 2017      Visit Date: 2017    Visit Dx:    ICD-10-CM ICD-9-CM   1. Right knee pain, unspecified chronicity M25.561 719.46       Patient Active Problem List   Diagnosis   • Adenocarcinoma of esophagus   • Adenomatous polyp of colon   • Anemia   • Duodenal ulcer   • Insomnia   • Malignant neoplasm of prostate   • RLS (restless legs syndrome)   • Thrombophlebitis of deep veins of lower extremity   • Ventral hernia without obstruction or gangrene   • Incisional hernia, without obstruction or gangrene   • Hernia, incisional   • Osteoarthrosis, localized, primary, knee   • BPH (benign prostatic hyperplasia)   • Stasis dermatitis   • Medicare annual wellness visit, subsequent   • Reactive depression   • DVT (deep venous thrombosis)   • Hammer toes of both feet        Past Medical History:   Diagnosis Date   • Anti-phospholipid syndrome     On lifelong anticoagulation therapy   • Atrial fibrillation     HAD IT FOR 3 HR AFTER SX ON ESOPHAGUS  AND CONVERTED BACK ON OWN   • Benign prostatic hypertrophy    • Bladder disorder     LEAKAGE   ON MED   • Clotting disorder    • Community acquired pneumonia    • Cough     Seeing pulmonary medicine 2016   • Deep venous thrombosis ,     Left lower extremity   • Esophageal carcinoma 2014    had chemo and radiation prior surgery   • Esophageal reflux    • Fatigue    • GERD (gastroesophageal reflux disease)    • Hemorrhoids    • HH (hiatus hernia)    • History of nephrolithiasis    • Hypertension    • Long-term (current) use of anticoagulants, INR goal 2.0-3.0    • Malignant neoplasm of prostate    • Pancreatitis    • Pneumonia    • Radiation    • Restless legs syndrome    • Squamous carcinoma     on the head   • Stasis dermatitis    • Weight loss         Past Surgical History:   Procedure Laterality Date   •  APPENDECTOMY  1950   • BRONCHOSCOPY      (Diagnostic)   • CATARACT EXTRACTION Bilateral 2014   • COLONOSCOPY  12/15/2014    Complete / Description: EH, IH, torts, stool, follow-up colonoscopy due in 5 years.   • COLONOSCOPY N/A 6/13/2017    Procedure: COLONOSCOPY TO CECUM AND INTO TERMINAL ILEUM;  Surgeon: Mulugeta BLACKWELL MD;  Location: [facility] ENDOSCOPY;  Service:    • ENDOSCOPY N/A 6/13/2017    Procedure: ESOPHAGOGASTRODUODENOSCOPY WITH COLD BIOPSY;  Surgeon: Lake Gonzalez MD;  Location: [facility] ENDOSCOPY;  Service:    • ESOPHAGECTOMY      April 2015, stage IIB esophageal carcinoma, sub-total resection.   • ESOPHAGECTOMY      Esophagectomy Subtotal [place] Procedure   • EXCISION LESION  08/2012    Removal of Squamous Cell CA on Head   • HAMMER TOE REPAIR  09/2014    Hammertoe Operation (Each Toe), 10/2014   • HERNIA REPAIR     • JEJUNOSTOMY      Laparoscopic   • KNEE SURGERY Bilateral 1967, 1973, 1981   • PROSTATE SURGERY     • PROSTATECTOMY  2010   • PYLOROPLASTY     • SPINAL FUSION  02/1998    C6-C7   • TONSILLECTOMY  1955   • UPPER GASTROINTESTINAL ENDOSCOPY  12/15/2014    [place] Grade D esophagitis, Pardo's, HH, multiple duodenal ulcers   • UPPER GASTROINTESTINAL ENDOSCOPY      (Therapeutic)   • VENTRAL/INCISIONAL HERNIA REPAIR N/A 4/14/2016    Procedure: VENTRAL/INCISIONAL HERNIA REPAIR, open, with mesh, and component separation;  Surgeon: Darren Rivas MD;  Location: [facility] MAIN OR;  Service:              PT Ortho       09/26/17 0900    Subjective Comments    Subjective Comments Pt reports that his HEP is going well and he is focusing on performing stretches as much as he possibly can. He says that walking is easier and his knees feels looser while he walks. He says that he is standing more upright during ambulation.  -LC    Subjective Pain    Able to rate subjective pain? yes  -LC    Pre-Treatment Pain Level 0  -LC    Post-Treatment Pain Level 0  -LC    Right Knee    Extension/Flexion ROM  "Details 7-120  -      User Key  (r) = Recorded By, (t) = Taken By, (c) = Cosigned By    Initials Name Provider Type    VICTORINA Leigh, PT DPT Physical Therapist                            PT Assessment/Plan       09/26/17 0922       PT Assessment    Functional Limitations Impaired gait;Impaired locomotion;Limitation in home management;Limitations in community activities;Performance in leisure activities;Limitations in functional capacity and performance  -     Impairments Balance;Range of motion;Muscle strength;Pain;Edema;Impaired flexibility;Gait  -     Assessment Comments Pt reports he has been focusing on hamstring stretches and feels that he is able to stand up straighter during walking. Pt achieved AROM R knee 7-120 today. He is demonstrating increased RLE strength and flexibility. He will be progressed to independence with HEP.  -     PT Plan    PT Plan Comments Pt requires continued skilled intervention to improve R knee flexibility and mobility during ambulation.  -       User Key  (r) = Recorded By, (t) = Taken By, (c) = Cosigned By    Initials Name Provider Type    VICTORINA Leigh, PT DPT Physical Therapist                    Exercises       09/26/17 0900          Subjective Comments    Subjective Comments Pt reports that his HEP is going well and he is focusing on performing stretches as much as he possibly can. He says that walking is easier and his knees feels looser while he walks. He says that he is standing more upright during ambulation.  -LC      Subjective Pain    Able to rate subjective pain? yes  -LC      Pre-Treatment Pain Level 0  -LC      Post-Treatment Pain Level 0  -LC      Exercise 1    Exercise Name 1 Nu Step  -LC      Time (Minutes) 1 5  -LC      Additional Comments level 5  -LC      Exercise 2    Exercise Name 2 Step Up 8\"  -LC      Sets 2 3  -LC      Reps 2 8  -LC      Exercise 3    Exercise Name 3 Standing Hip EXT and ABD  -LC      Sets 3 3  -LC      Reps 3 8  -LC      " Exercise 4    Exercise Name 4 LAQ  -LC      Sets 4 3  -LC      Reps 4 8  -LC      Exercise 5    Exercise Name 5 SLR  -LC      Sets 5 3  -LC      Reps 5 8  -LC      Exercise 6    Exercise Name 6 bridge on ball  -LC      Sets 6 3  -LC      Reps 6 8  -LC      Exercise 7    Exercise Name 7 HS curl on ball  -LC      Sets 7 3  -LC      Reps 7 8  -LC      Exercise 8    Exercise Name 8 TKE  -LC      Sets 8 3  -LC      Reps 8 8  -LC      Exercise 9    Exercise Name 9 standing hamstring stretch  -LC      Reps 9 5  -LC      Time (Seconds) 9 15  -LC      Exercise 10    Exercise Name 10 supine hamstring stretch  -LC      Reps 10 5  -LC      Time (Seconds) 10 15  -LC        User Key  (r) = Recorded By, (t) = Taken By, (c) = Cosigned By    Initials Name Provider Type    VICTORINA Leigh, PT DPT Physical Therapist                        Manual Rx (last 36 hours)      Manual Treatments       09/26/17 0900          Manual Rx 1    Manual Rx 1 Location R knee   -LC      Manual Rx 1 Type hamstring stretch, quad stretch, PROM  -LC      Manual Rx 1 Duration 15 min  -LC        User Key  (r) = Recorded By, (t) = Taken By, (c) = Cosigned By    Initials Name Provider Type    VICTORINA Leigh, PT DPT Physical Therapist                    Therapy Education       09/26/17 0921          Therapy Education    Given HEP;Symptoms/condition management;Pain management  -LC      Program Reinforced  -LC      How Provided Verbal;Demonstration  -LC      Provided to Patient  -LC      Level of Understanding Teach back education performed;Verbalized;Demonstrated  -LC        User Key  (r) = Recorded By, (t) = Taken By, (c) = Cosigned By    Initials Name Provider Type    VICTORINA Leigh, PT DPT Physical Therapist                Time Calculation:   Start Time: 0900  Stop Time: 0940  Time Calculation (min): 40 min  Total Timed Code Minutes- PT: 40 minute(s)    Therapy Charges for Today     Code Description Service Date Service Provider Modifiers Qty     25183706404  PT THER PROC EA 15 MIN 9/26/2017 Misael Leigh, PT DPT GP 2    97721845593 HC PT MANUAL THERAPY EA 15 MIN 9/26/2017 Misael Leigh, PT DPT GP 1                    Misael Leigh, PT DPT  9/26/2017

## 2017-09-27 ENCOUNTER — APPOINTMENT (OUTPATIENT)
Dept: PREADMISSION TESTING | Facility: HOSPITAL | Age: 67
End: 2017-09-27

## 2017-09-27 VITALS
HEIGHT: 77 IN | RESPIRATION RATE: 16 BRPM | WEIGHT: 233.2 LBS | OXYGEN SATURATION: 97 % | BODY MASS INDEX: 27.54 KG/M2 | DIASTOLIC BLOOD PRESSURE: 71 MMHG | SYSTOLIC BLOOD PRESSURE: 116 MMHG | HEART RATE: 62 BPM | TEMPERATURE: 98.2 F

## 2017-09-27 PROCEDURE — 93010 ELECTROCARDIOGRAM REPORT: CPT | Performed by: INTERNAL MEDICINE

## 2017-09-27 PROCEDURE — 93005 ELECTROCARDIOGRAM TRACING: CPT

## 2017-09-27 RX ORDER — PAROXETINE HYDROCHLORIDE 20 MG/1
20 TABLET, FILM COATED ORAL EVERY EVENING
COMMUNITY
End: 2017-11-13 | Stop reason: SDUPTHER

## 2017-09-27 RX ORDER — PRAMIPEXOLE DIHYDROCHLORIDE 1.5 MG/1
1.5 TABLET ORAL DAILY
COMMUNITY
End: 2018-07-05 | Stop reason: SDUPTHER

## 2017-09-27 RX ORDER — WARFARIN SODIUM 5 MG/1
5 TABLET ORAL
COMMUNITY
End: 2017-11-13 | Stop reason: SDUPTHER

## 2017-09-28 ENCOUNTER — HOSPITAL ENCOUNTER (OUTPATIENT)
Dept: PHYSICAL THERAPY | Facility: HOSPITAL | Age: 67
Setting detail: THERAPIES SERIES
Discharge: HOME | End: 2017-09-28

## 2017-09-28 DIAGNOSIS — M25.561 RIGHT KNEE PAIN, UNSPECIFIED CHRONICITY: Primary | ICD-10-CM

## 2017-09-28 PROCEDURE — G8979 MOBILITY GOAL STATUS: HCPCS | Performed by: PHYSICAL THERAPIST

## 2017-09-28 PROCEDURE — G8980 MOBILITY D/C STATUS: HCPCS | Performed by: PHYSICAL THERAPIST

## 2017-09-28 PROCEDURE — 97140 MANUAL THERAPY 1/> REGIONS: CPT | Performed by: PHYSICAL THERAPIST

## 2017-09-28 PROCEDURE — 97110 THERAPEUTIC EXERCISES: CPT | Performed by: PHYSICAL THERAPIST

## 2017-09-28 NOTE — THERAPY DISCHARGE NOTE
Outpatient Physical Therapy Ortho Treatment Note/Discharge Summary  [facility]     Patient Name: Jose Hurtado  : 1950  MRN: [id number]  Today's Date: 2017      Visit Date: 2017    Visit Dx:    ICD-10-CM ICD-9-CM   1. Right knee pain, unspecified chronicity M25.561 719.46       Patient Active Problem List   Diagnosis   • Adenocarcinoma of esophagus   • Adenomatous polyp of colon   • Anemia   • Duodenal ulcer   • Insomnia   • Malignant neoplasm of prostate   • RLS (restless legs syndrome)   • Thrombophlebitis of deep veins of lower extremity   • Ventral hernia without obstruction or gangrene   • Incisional hernia, without obstruction or gangrene   • Hernia, incisional   • Osteoarthrosis, localized, primary, knee   • BPH (benign prostatic hyperplasia)   • Stasis dermatitis   • Medicare annual wellness visit, subsequent   • Reactive depression   • DVT (deep venous thrombosis)   • Hammer toes of both feet        Past Medical History:   Diagnosis Date   • Anti-phospholipid syndrome     On lifelong anticoagulation therapy   • Arthritis     RIGHT KNEE   • Bladder disorder     LEAKAGE   ON MED   • Community acquired pneumonia     HISTORY OF IN    • Deep venous thrombosis ,     Left lower extremity   • Esophageal carcinoma 2014    had chemo and radiation prior surgery   • Fatigue    • Hammer toe of left foot    • Hemorrhoids    • HH (hiatus hernia)    • History of atrial fibrillation 2015    ONE EPISODE WHILE HOSPITALIZED   • History of kidney stones    • History of nephrolithiasis    • History of pancreatitis     PT STATES MANY YEARS AGO   • History of radiation therapy    • Hypertension    • Long-term (current) use of anticoagulants, INR goal 2.0-3.0    • Malignant neoplasm of prostate    • Restless legs syndrome    • Squamous carcinoma     on the head   • Stasis dermatitis         Past Surgical History:   Procedure Laterality Date   • APPENDECTOMY     • BRONCHOSCOPY       (Diagnostic)   • CATARACT EXTRACTION Bilateral 2014   • COLONOSCOPY  12/15/2014    Complete / Description: EH, IH, torts, stool, follow-up colonoscopy due in 5 years.   • COLONOSCOPY N/A 6/13/2017    Procedure: COLONOSCOPY TO CECUM AND INTO TERMINAL ILEUM;  Surgeon: Mulugeta BLACKWELL MD;  Location: [facility] ENDOSCOPY;  Service:    • ENDOSCOPY N/A 6/13/2017    Procedure: ESOPHAGOGASTRODUODENOSCOPY WITH COLD BIOPSY;  Surgeon: Lake Gonzalez MD;  Location: [facility] ENDOSCOPY;  Service:    • ESOPHAGECTOMY      April 2015, stage IIB esophageal carcinoma, sub-total resection.   • ESOPHAGECTOMY      Esophagectomy Subtotal [place] Procedure   • EXCISION LESION  08/2012    Removal of Squamous Cell CA on Head   • HAMMER TOE REPAIR  09/2014    Hammertoe Operation (Each Toe), 10/2014   • HERNIA REPAIR     • JEJUNOSTOMY      Laparoscopic   • KNEE SURGERY Bilateral 1967, 1973, 1981   • PROSTATECTOMY  2010   • PYLOROPLASTY     • SPINAL FUSION  02/1998    C 5,6   • TONSILLECTOMY  1955   • UPPER GASTROINTESTINAL ENDOSCOPY  12/15/2014    [place] Grade D esophagitis, Pardo's, HH, multiple duodenal ulcers   • UPPER GASTROINTESTINAL ENDOSCOPY      (Therapeutic)   • VENTRAL/INCISIONAL HERNIA REPAIR N/A 4/14/2016    Procedure: VENTRAL/INCISIONAL HERNIA REPAIR, open, with mesh, and component separation;  Surgeon: Darren Rivas MD;  Location: [facility] MAIN OR;  Service:              PT Ortho       09/28/17 0900    Subjective Comments    Subjective Comments Pt says that stretching his knees are part of his daily routine. he understands that he has to continue to be consistent with HEP to maintain good knee ROM and continue to improve normal gait pattern  -LC    Subjective Pain    Able to rate subjective pain? yes  -LC    Pre-Treatment Pain Level 0  -LC    Post-Treatment Pain Level 0  -LC    Right Knee    Extension/Flexion ROM Details 5-120  -LC      09/26/17 0900    Subjective Comments    Subjective Comments Pt reports that his  HEP is going well and he is focusing on performing stretches as much as he possibly can. He says that walking is easier and his knees feels looser while he walks. He says that he is standing more upright during ambulation.  -    Subjective Pain    Able to rate subjective pain? yes  -    Pre-Treatment Pain Level 0  -    Post-Treatment Pain Level 0  -    Right Knee    Extension/Flexion ROM Details 7-120  -      User Key  (r) = Recorded By, (t) = Taken By, (c) = Cosigned By    Initials Name Provider Type    VICTORINA Leigh, PT DPT Physical Therapist                            PT Assessment/Plan       09/28/17 0949       PT Assessment    Functional Limitations Impaired gait;Impaired locomotion;Limitation in home management;Limitations in community activities;Performance in leisure activities;Limitations in functional capacity and performance  -     Impairments Balance;Range of motion;Muscle strength;Pain;Edema;Impaired flexibility;Gait  -     Assessment Comments Pt demonstrated RIGHT knee AROM 5-120 today. He demonstrates the ability to perform sit to stand and squat with little to know difficulty. He reports that he is ambulating with more erect posture and decreased pain. He is very pleased with the progress he has made and feels comfortable D/C home with independence to perform his HEP.  -     PT Plan    PT Plan Comments Pt will D/C to independence with HEP.  -       User Key  (r) = Recorded By, (t) = Taken By, (c) = Cosigned By    Initials Name Provider Type    VICTORINA Leigh PT RENNY Physical Therapist                    Exercises       09/28/17 0900          Subjective Comments    Subjective Comments Pt says that stretching his knees are part of his daily routine. he understands that he has to continue to be consistent with HEP to maintain good knee ROM and continue to improve normal gait pattern  -      Subjective Pain    Able to rate subjective pain? yes  -      Pre-Treatment Pain Level  "0  -LC      Post-Treatment Pain Level 0  -LC      Exercise 1    Exercise Name 1 Nu Step  -LC      Time (Minutes) 1 5  -LC      Exercise 2    Exercise Name 2 Step Up 8\"  -LC      Sets 2 3  -LC      Reps 2 8  -LC      Exercise 3    Exercise Name 3 Standing Hip EXT and ABD  -LC      Sets 3 3  -LC      Reps 3 8  -LC      Additional Comments GTB  -LC      Exercise 4    Exercise Name 4 LAQ  -LC      Sets 4 3  -LC      Reps 4 8  -LC      Additional Comments 5#s  -LC      Exercise 5    Exercise Name 5 SLR  -LC      Sets 5 3  -LC      Reps 5 8  -LC      Additional Comments 5#s  -LC      Exercise 6    Exercise Name 6 bridge on ball  -LC      Sets 6 3  -LC      Reps 6 8  -LC      Exercise 7    Exercise Name 7 HS curl on ball  -LC      Sets 7 3  -LC      Reps 7 8  -LC      Exercise 8    Exercise Name 8 TKE  -LC      Sets 8 3  -LC      Reps 8 8  -LC      Exercise 9    Exercise Name 9 standing hamstring stretch  -LC      Reps 9 5  -LC      Time (Seconds) 9 15  -LC      Exercise 10    Exercise Name 10 supine hamstring stretch  -LC      Reps 10 5  -LC      Time (Seconds) 10 15  -LC        User Key  (r) = Recorded By, (t) = Taken By, (c) = Cosigned By    Initials Name Provider Type    VICTORINA Leigh, PT DPT Physical Therapist                        Manual Rx (last 36 hours)      Manual Treatments       09/28/17 0900          Manual Rx 1    Manual Rx 1 Location R knee   -LC      Manual Rx 1 Type hamstring stretch, quad stretch, PROM  -LC      Manual Rx 1 Duration 15 min  -LC        User Key  (r) = Recorded By, (t) = Taken By, (c) = Cosigned By    Initials Name Provider Type    VICTORINA Leigh, PT DPT Physical Therapist                PT OP Goals       09/28/17 0900       PT Short Term Goals    STG 1 Pt will be independent with HEP for increasing R knee AROM and strength  -LC     STG 1 Progress Met  -LC     STG 2 Pt will achieve R knee AROM 5-125  -LC     STG 2 Progress Partially Met  -LC     STG 2 Progress Comments 5-120  -LC  "    STG 3 Pt will report 15% improvement on Knee outcome survey.  -     STG 3 Progress Progressing;Not Met  -LC     STG 3 Progress Comments Pt did not complete Knee outcome survey at last visit  -     STG 4 Pt will report pain no greater than 2/10 when he initially starts moving after sitting for a long period of time.  -     STG 4 Progress Met  -LC       User Key  (r) = Recorded By, (t) = Taken By, (c) = Cosigned By    Initials Name Provider Type    VICTORINA Leigh, PT DPT Physical Therapist                Therapy Education       09/28/17 0915          Therapy Education    Given HEP;Symptoms/condition management;Pain management  -LC      Program Reinforced  -LC      How Provided Verbal;Demonstration  -LC      Provided to Patient  -LC      Level of Understanding Teach back education performed;Verbalized;Demonstrated  -LC        User Key  (r) = Recorded By, (t) = Taken By, (c) = Cosigned By    Initials Name Provider Type    VICTORINA Leigh, PT DPT Physical Therapist                Time Calculation:   Start Time: 0855  Stop Time: 0935  Time Calculation (min): 40 min  Total Timed Code Minutes- PT: 40 minute(s)    Therapy Charges for Today     Code Description Service Date Service Provider Modifiers Qty    14039539350 HC PT THER PROC EA 15 MIN 9/28/2017 Misael Leigh, PT DPT GP 2    98215508723 HC PT MANUAL THERAPY EA 15 MIN 9/28/2017 Misael Leigh, PT DPT GP 1    42878115760 HC PT MOBILITY PROJECTED 9/28/2017 Misael Leigh PT DPT GP, CJ 1    80422603541 HC PT MOBILITY DISCHARGE 9/28/2017 Misael Leigh PT DPT GP, CJ 1          PT G-Codes  PT Professional Judgement Used?: Yes  Functional Limitation: Mobility: Walking and moving around  Mobility: Walking and Moving Around Goal Status (): At least 20 percent but less than 40 percent impaired, limited or restricted  Mobility: Walking and Moving Around Discharge Status (): At least 20 percent but less than 40 percent impaired, limited or restricted      OP PT Discharge Summary  Date of Discharge: 09/28/17  Reason for Discharge: Maximum functional potential achieved, Independent  Outcomes Achieved: Patient able to partially acheive established goals, Refer to plan of care for updates on goals achieved  Discharge Destination: Home with home program  Discharge Instructions: Pt instructed to contact PT if any questions regarding HEP or any change in status.      Misael Leigh, PT DPT  9/28/2017

## 2017-10-03 ENCOUNTER — APPOINTMENT (OUTPATIENT)
Dept: GENERAL RADIOLOGY | Facility: HOSPITAL | Age: 67
End: 2017-10-03

## 2017-10-03 ENCOUNTER — ANESTHESIA (OUTPATIENT)
Dept: PERIOP | Facility: HOSPITAL | Age: 67
End: 2017-10-03

## 2017-10-03 ENCOUNTER — HOSPITAL ENCOUNTER (OUTPATIENT)
Facility: HOSPITAL | Age: 67
Setting detail: HOSPITAL OUTPATIENT SURGERY
Discharge: HOME | End: 2017-10-03
Attending: ORTHOPAEDIC SURGERY | Admitting: ORTHOPAEDIC SURGERY

## 2017-10-03 ENCOUNTER — ANESTHESIA EVENT (OUTPATIENT)
Dept: PERIOP | Facility: HOSPITAL | Age: 67
End: 2017-10-03

## 2017-10-03 VITALS
HEART RATE: 65 BPM | TEMPERATURE: 97.2 F | DIASTOLIC BLOOD PRESSURE: 79 MMHG | SYSTOLIC BLOOD PRESSURE: 133 MMHG | RESPIRATION RATE: 16 BRPM | OXYGEN SATURATION: 97 %

## 2017-10-03 DIAGNOSIS — M20.41 HAMMER TOES OF BOTH FEET: ICD-10-CM

## 2017-10-03 DIAGNOSIS — C15.9 ADENOCARCINOMA OF ESOPHAGUS (HCC): ICD-10-CM

## 2017-10-03 DIAGNOSIS — M20.42 HAMMER TOES OF BOTH FEET: ICD-10-CM

## 2017-10-03 LAB
INR PPP: 1.26 (ref 0.9–1.1)
PROTHROMBIN TIME: 15.4 SECONDS (ref 11.7–14.2)

## 2017-10-03 PROCEDURE — 25010000002 DEXAMETHASONE PER 1 MG: Performed by: ANESTHESIOLOGY

## 2017-10-03 PROCEDURE — 76000 FLUOROSCOPY <1 HR PHYS/QHP: CPT

## 2017-10-03 PROCEDURE — 73660 X-RAY EXAM OF TOE(S): CPT

## 2017-10-03 PROCEDURE — 25010000002 MIDAZOLAM PER 1 MG

## 2017-10-03 PROCEDURE — C1713 ANCHOR/SCREW BN/BN,TIS/BN: HCPCS | Performed by: ORTHOPAEDIC SURGERY

## 2017-10-03 PROCEDURE — 25010000002 FENTANYL CITRATE (PF) 100 MCG/2ML SOLUTION

## 2017-10-03 PROCEDURE — 25010000002 ONDANSETRON PER 1 MG: Performed by: NURSE ANESTHETIST, CERTIFIED REGISTERED

## 2017-10-03 PROCEDURE — 85610 PROTHROMBIN TIME: CPT | Performed by: ORTHOPAEDIC SURGERY

## 2017-10-03 PROCEDURE — 25010000002 PROPOFOL 10 MG/ML EMULSION: Performed by: NURSE ANESTHETIST, CERTIFIED REGISTERED

## 2017-10-03 PROCEDURE — 25010000003 CEFAZOLIN IN DEXTROSE 2-4 GM/100ML-% SOLUTION: Performed by: ORTHOPAEDIC SURGERY

## 2017-10-03 PROCEDURE — 25010000002 ROPIVACAINE PER 1 MG: Performed by: ANESTHESIOLOGY

## 2017-10-03 PROCEDURE — 28285 REPAIR OF HAMMERTOE: CPT | Performed by: ORTHOPAEDIC SURGERY

## 2017-10-03 DEVICE — KWIRE SMOTH DBL/TROC .054X4IN: Type: IMPLANTABLE DEVICE | Site: TOE SECOND | Status: FUNCTIONAL

## 2017-10-03 RX ORDER — PROMETHAZINE HYDROCHLORIDE 25 MG/1
25 TABLET ORAL ONCE AS NEEDED
Status: DISCONTINUED | OUTPATIENT
Start: 2017-10-03 | End: 2017-10-03 | Stop reason: HOSPADM

## 2017-10-03 RX ORDER — FENTANYL CITRATE 50 UG/ML
50 INJECTION, SOLUTION INTRAMUSCULAR; INTRAVENOUS
Status: DISCONTINUED | OUTPATIENT
Start: 2017-10-03 | End: 2017-10-03 | Stop reason: HOSPADM

## 2017-10-03 RX ORDER — OXYCODONE AND ACETAMINOPHEN 7.5; 325 MG/1; MG/1
1 TABLET ORAL ONCE AS NEEDED
Status: DISCONTINUED | OUTPATIENT
Start: 2017-10-03 | End: 2017-10-03 | Stop reason: HOSPADM

## 2017-10-03 RX ORDER — HYDROMORPHONE HYDROCHLORIDE 1 MG/ML
0.5 INJECTION, SOLUTION INTRAMUSCULAR; INTRAVENOUS; SUBCUTANEOUS
Status: DISCONTINUED | OUTPATIENT
Start: 2017-10-03 | End: 2017-10-03 | Stop reason: HOSPADM

## 2017-10-03 RX ORDER — PROMETHAZINE HYDROCHLORIDE 25 MG/1
12.5 TABLET ORAL ONCE AS NEEDED
Status: DISCONTINUED | OUTPATIENT
Start: 2017-10-03 | End: 2017-10-03 | Stop reason: HOSPADM

## 2017-10-03 RX ORDER — SODIUM CHLORIDE, SODIUM LACTATE, POTASSIUM CHLORIDE, CALCIUM CHLORIDE 600; 310; 30; 20 MG/100ML; MG/100ML; MG/100ML; MG/100ML
9 INJECTION, SOLUTION INTRAVENOUS CONTINUOUS
Status: DISCONTINUED | OUTPATIENT
Start: 2017-10-03 | End: 2017-10-03 | Stop reason: HOSPADM

## 2017-10-03 RX ORDER — MAGNESIUM HYDROXIDE 1200 MG/15ML
LIQUID ORAL AS NEEDED
Status: DISCONTINUED | OUTPATIENT
Start: 2017-10-03 | End: 2017-10-03 | Stop reason: HOSPADM

## 2017-10-03 RX ORDER — HYDRALAZINE HYDROCHLORIDE 20 MG/ML
5 INJECTION INTRAMUSCULAR; INTRAVENOUS
Status: DISCONTINUED | OUTPATIENT
Start: 2017-10-03 | End: 2017-10-03 | Stop reason: HOSPADM

## 2017-10-03 RX ORDER — PROMETHAZINE HYDROCHLORIDE 25 MG/ML
12.5 INJECTION, SOLUTION INTRAMUSCULAR; INTRAVENOUS ONCE AS NEEDED
Status: DISCONTINUED | OUTPATIENT
Start: 2017-10-03 | End: 2017-10-03 | Stop reason: HOSPADM

## 2017-10-03 RX ORDER — FAMOTIDINE 10 MG/ML
20 INJECTION, SOLUTION INTRAVENOUS ONCE
Status: DISCONTINUED | OUTPATIENT
Start: 2017-10-03 | End: 2017-10-03 | Stop reason: HOSPADM

## 2017-10-03 RX ORDER — OXYCODONE HYDROCHLORIDE AND ACETAMINOPHEN 5; 325 MG/1; MG/1
1 TABLET ORAL ONCE AS NEEDED
Status: DISCONTINUED | OUTPATIENT
Start: 2017-10-03 | End: 2017-10-03 | Stop reason: HOSPADM

## 2017-10-03 RX ORDER — ACETAMINOPHEN 650 MG
TABLET, EXTENDED RELEASE ORAL AS NEEDED
Status: DISCONTINUED | OUTPATIENT
Start: 2017-10-03 | End: 2017-10-03 | Stop reason: HOSPADM

## 2017-10-03 RX ORDER — PROMETHAZINE HYDROCHLORIDE 25 MG/1
25 SUPPOSITORY RECTAL ONCE AS NEEDED
Status: DISCONTINUED | OUTPATIENT
Start: 2017-10-03 | End: 2017-10-03 | Stop reason: HOSPADM

## 2017-10-03 RX ORDER — DEXAMETHASONE SODIUM PHOSPHATE 4 MG/ML
INJECTION, SOLUTION INTRA-ARTICULAR; INTRALESIONAL; INTRAMUSCULAR; INTRAVENOUS; SOFT TISSUE AS NEEDED
Status: DISCONTINUED | OUTPATIENT
Start: 2017-10-03 | End: 2017-10-03 | Stop reason: SURG

## 2017-10-03 RX ORDER — FAMOTIDINE 10 MG/ML
INJECTION, SOLUTION INTRAVENOUS
Status: COMPLETED
Start: 2017-10-03 | End: 2017-10-03

## 2017-10-03 RX ORDER — MIDAZOLAM HYDROCHLORIDE 1 MG/ML
1 INJECTION INTRAMUSCULAR; INTRAVENOUS
Status: DISCONTINUED | OUTPATIENT
Start: 2017-10-03 | End: 2017-10-03 | Stop reason: HOSPADM

## 2017-10-03 RX ORDER — HYDROCODONE BITARTRATE AND ACETAMINOPHEN 7.5; 325 MG/1; MG/1
1 TABLET ORAL ONCE AS NEEDED
Status: DISCONTINUED | OUTPATIENT
Start: 2017-10-03 | End: 2017-10-03 | Stop reason: HOSPADM

## 2017-10-03 RX ORDER — ONDANSETRON 2 MG/ML
INJECTION INTRAMUSCULAR; INTRAVENOUS AS NEEDED
Status: DISCONTINUED | OUTPATIENT
Start: 2017-10-03 | End: 2017-10-03 | Stop reason: SURG

## 2017-10-03 RX ORDER — MIDAZOLAM HYDROCHLORIDE 1 MG/ML
INJECTION INTRAMUSCULAR; INTRAVENOUS
Status: COMPLETED
Start: 2017-10-03 | End: 2017-10-03

## 2017-10-03 RX ORDER — CEFAZOLIN SODIUM 2 G/100ML
2 INJECTION, SOLUTION INTRAVENOUS ONCE
Status: COMPLETED | OUTPATIENT
Start: 2017-10-03 | End: 2017-10-03

## 2017-10-03 RX ORDER — ONDANSETRON 2 MG/ML
4 INJECTION INTRAMUSCULAR; INTRAVENOUS ONCE AS NEEDED
Status: DISCONTINUED | OUTPATIENT
Start: 2017-10-03 | End: 2017-10-03 | Stop reason: HOSPADM

## 2017-10-03 RX ORDER — ROPIVACAINE HYDROCHLORIDE 5 MG/ML
INJECTION, SOLUTION EPIDURAL; INFILTRATION; PERINEURAL AS NEEDED
Status: DISCONTINUED | OUTPATIENT
Start: 2017-10-03 | End: 2017-10-03 | Stop reason: SURG

## 2017-10-03 RX ORDER — PROPOFOL 10 MG/ML
VIAL (ML) INTRAVENOUS AS NEEDED
Status: DISCONTINUED | OUTPATIENT
Start: 2017-10-03 | End: 2017-10-03 | Stop reason: SURG

## 2017-10-03 RX ORDER — OXYCODONE HYDROCHLORIDE AND ACETAMINOPHEN 5; 325 MG/1; MG/1
1-2 TABLET ORAL EVERY 4 HOURS PRN
Qty: 80 TABLET | Refills: 0 | Status: SHIPPED | OUTPATIENT
Start: 2017-10-03 | End: 2017-11-08

## 2017-10-03 RX ORDER — MIDAZOLAM HYDROCHLORIDE 1 MG/ML
2 INJECTION INTRAMUSCULAR; INTRAVENOUS
Status: DISCONTINUED | OUTPATIENT
Start: 2017-10-03 | End: 2017-10-03 | Stop reason: HOSPADM

## 2017-10-03 RX ORDER — SODIUM CHLORIDE 0.9 % (FLUSH) 0.9 %
1-10 SYRINGE (ML) INJECTION AS NEEDED
Status: DISCONTINUED | OUTPATIENT
Start: 2017-10-03 | End: 2017-10-03 | Stop reason: HOSPADM

## 2017-10-03 RX ORDER — EPHEDRINE SULFATE 50 MG/ML
5 INJECTION, SOLUTION INTRAVENOUS ONCE AS NEEDED
Status: DISCONTINUED | OUTPATIENT
Start: 2017-10-03 | End: 2017-10-03 | Stop reason: HOSPADM

## 2017-10-03 RX ORDER — FENTANYL CITRATE 50 UG/ML
INJECTION, SOLUTION INTRAMUSCULAR; INTRAVENOUS
Status: COMPLETED
Start: 2017-10-03 | End: 2017-10-03

## 2017-10-03 RX ORDER — LIDOCAINE HYDROCHLORIDE 20 MG/ML
INJECTION, SOLUTION INFILTRATION; PERINEURAL AS NEEDED
Status: DISCONTINUED | OUTPATIENT
Start: 2017-10-03 | End: 2017-10-03 | Stop reason: SURG

## 2017-10-03 RX ORDER — CEPHALEXIN 500 MG/1
500 CAPSULE ORAL EVERY 6 HOURS
Qty: 8 CAPSULE | Refills: 0 | Status: SHIPPED | OUTPATIENT
Start: 2017-10-03 | End: 2017-10-05

## 2017-10-03 RX ORDER — LABETALOL HYDROCHLORIDE 5 MG/ML
5 INJECTION, SOLUTION INTRAVENOUS
Status: DISCONTINUED | OUTPATIENT
Start: 2017-10-03 | End: 2017-10-03 | Stop reason: HOSPADM

## 2017-10-03 RX ORDER — DIPHENHYDRAMINE HYDROCHLORIDE 50 MG/ML
12.5 INJECTION INTRAMUSCULAR; INTRAVENOUS
Status: DISCONTINUED | OUTPATIENT
Start: 2017-10-03 | End: 2017-10-03 | Stop reason: HOSPADM

## 2017-10-03 RX ADMIN — FENTANYL CITRATE 50 MCG: 50 INJECTION INTRAMUSCULAR; INTRAVENOUS at 07:13

## 2017-10-03 RX ADMIN — DEXAMETHASONE SODIUM PHOSPHATE 4 MG: 4 INJECTION, SOLUTION INTRAMUSCULAR; INTRAVENOUS at 08:59

## 2017-10-03 RX ADMIN — MIDAZOLAM 2 MG: 1 INJECTION INTRAMUSCULAR; INTRAVENOUS at 07:13

## 2017-10-03 RX ADMIN — FAMOTIDINE 20 MG: 10 INJECTION, SOLUTION INTRAVENOUS at 07:13

## 2017-10-03 RX ADMIN — SODIUM CHLORIDE, POTASSIUM CHLORIDE, SODIUM LACTATE AND CALCIUM CHLORIDE 9 ML/HR: 600; 310; 30; 20 INJECTION, SOLUTION INTRAVENOUS at 06:34

## 2017-10-03 RX ADMIN — PROPOFOL 150 MG: 10 INJECTION, EMULSION INTRAVENOUS at 07:33

## 2017-10-03 RX ADMIN — CEFAZOLIN SODIUM 2 G: 2 INJECTION, SOLUTION INTRAVENOUS at 07:28

## 2017-10-03 RX ADMIN — LIDOCAINE HYDROCHLORIDE 100 MG: 20 INJECTION, SOLUTION INFILTRATION; PERINEURAL at 07:33

## 2017-10-03 RX ADMIN — ONDANSETRON 4 MG: 2 INJECTION INTRAMUSCULAR; INTRAVENOUS at 08:59

## 2017-10-03 RX ADMIN — DEXAMETHASONE SODIUM PHOSPHATE 4 MG: 4 INJECTION, SOLUTION INTRAMUSCULAR; INTRAVENOUS at 07:18

## 2017-10-03 RX ADMIN — ROPIVACAINE HYDROCHLORIDE 30 ML: 5 INJECTION, SOLUTION EPIDURAL; INFILTRATION; PERINEURAL at 07:18

## 2017-10-03 NOTE — PLAN OF CARE
Problem: Perioperative Period (Adult)  Goal: Signs and Symptoms of Listed Potential Problems Will be Absent or Manageable (Perioperative Period)  Outcome: Outcome(s) achieved Date Met:  10/03/17    10/03/17 1016   Perioperative Period   Problems Assessed (Perioperative Period) all   Problems Present (Perioperative Period) none

## 2017-10-03 NOTE — ANESTHESIA POSTPROCEDURE EVALUATION
Patient: Jose Hurtado    Procedure Summary     Date Anesthesia Start Anesthesia Stop Room / Location    10/03/17 0728 0928  [facility]  /  [facility]       Procedure Diagnosis Surgeon Provider    Left second third and fourth distal interphalangeal joint resection with flexor tenotomy (Left Toes) Hammer toes of both feet  (Hammer toes of both feet [M20.41, M20.42]) MD Sumanth Raymond MD          Anesthesia Type: general, regional  Last vitals  BP   133/79 (10/03/17 1011)    Temp   36.2 °C (97.2 °F) (10/03/17 1000)    Pulse   65 (10/03/17 1011)   Resp   16 (10/03/17 1011)    SpO2   97 % (10/03/17 1011)      Post Anesthesia Care and Evaluation    Patient location during evaluation: bedside  Patient participation: complete - patient participated  Level of consciousness: awake  Pain score: 1  Pain management: adequate  Airway patency: patent  Anesthetic complications: No anesthetic complications    Cardiovascular status: acceptable  Respiratory status: acceptable  Hydration status: acceptable    Comments: --------------------            10/03/17               1011     --------------------   BP:       133/79     Pulse:      65       Resp:       16       Temp:                SpO2:      97%      --------------------

## 2017-10-03 NOTE — ANESTHESIA PROCEDURE NOTES
Peripheral Block    Patient location during procedure: holding area  Start time: 10/3/2017 7:08 AM  Stop time: 10/3/2017 7:19 AM  Reason for block: at surgeon's request and post-op pain management  Performed by  Anesthesiologist: TENZIN STEELE  Preanesthetic Checklist  Completed: patient identified, site marked, surgical consent, pre-op evaluation, timeout performed, IV checked, risks and benefits discussed and monitors and equipment checked  Prep:  Sterile barriers:gloves and cap  Prep: ChloraPrep  Patient monitoring: blood pressure monitoring, continuous pulse oximetry and EKG  Procedure  Sedation:yes  Performed under: local infiltration  Guidance:ultrasound guided  ULTRASOUND INTERPRETATION. Using ultrasound guidance a 21 G gauge needle was placed in close proximity to the nerve, at which point, under ultrasound guidance anesthetic was injected in the area of the nerve and spread of the anesthesia was seen on ultrasound in close proximity thereto.  There were no abnormalities seen on ultrasound; a digital image was taken; and the patient tolerated the procedure with no complications. Images:still images obtained    Laterality:left  Block Type:ankle  Injection Technique:single-shot  Needle Type:echogenic  Needle Gauge:25 G  Resistance on Injection: none  Medications  Comment:Dexamethasone 4 mg  Local Injected:ropivacaine 0.5% without epinephrine Local Amount Injected:30mL  Post Assessment  Injection Assessment: negative aspiration for heme, no paresthesia on injection and incremental injection  Patient Tolerance:comfortable throughout block  Complications:no  Additional Notes

## 2017-10-03 NOTE — PLAN OF CARE
Problem: Patient Care Overview (Adult)  Goal: Discharge Needs Assessment  Outcome: Ongoing (interventions implemented as appropriate)    10/03/17 0535   Discharge Needs Assessment   Concerns To Be Addressed no discharge needs identified   Equipment Needed After Discharge crutches, axillary   Discharge Disposition home or self-care   Self-Care   Equipment Currently Used at Home none   Living Environment   Transportation Available car;family or friend will provide

## 2017-10-03 NOTE — PLAN OF CARE
Problem: Patient Care Overview (Adult)  Goal: Plan of Care Review  Outcome: Ongoing (interventions implemented as appropriate)    10/03/17 1006   Coping/Psychosocial Response Interventions   Plan Of Care Reviewed With patient   Patient Care Overview   Progress improving   Outcome Evaluation   Outcome Summary/Follow up Plan stable pain free recovery       Goal: Adult Individualization and Mutuality  Outcome: Ongoing (interventions implemented as appropriate)  Goal: Discharge Needs Assessment  Outcome: Ongoing (interventions implemented as appropriate)

## 2017-10-03 NOTE — ANESTHESIA PROCEDURE NOTES
Airway  Urgency: elective    Airway not difficult    General Information and Staff    Patient location during procedure: OR  Anesthesiologist: TENZIN STEELE  CRNA: EULALIA SMITH    Indications and Patient Condition  Indications for airway management: airway protection    Preoxygenated: yes  Mask difficulty assessment: 0 - not attempted    Final Airway Details  Final airway type: supraglottic airway      Successful airway: classic  Size 5    Number of attempts at approach: 1    Additional Comments  LMA inserted without difficulty.  Lips and teeth intact as preop condition.  No signs or symptoms of gastric regurgitation.  Seal with minimal pressure and secure.

## 2017-10-03 NOTE — ANESTHESIA PREPROCEDURE EVALUATION
Anesthesia Evaluation     Patient summary reviewed and Nursing notes reviewed   NPO Solid Status: > 8 hours       Airway   Mallampati: II  TM distance: >3 FB  Neck ROM: full  Dental - normal exam     Pulmonary - normal exam    breath sounds clear to auscultation  (+) pneumonia ,   Cardiovascular - normal exam    ECG reviewed  Rhythm: regular  Rate: normal    (+) hypertension, PVD, DVT resolved,   (-) angina, orthopnea, PND, AGUILERA      Neuro/Psych- negative ROS  GI/Hepatic/Renal/Endo    (+)  hiatal hernia,     Musculoskeletal     Abdominal    Substance History - negative use     OB/GYN negative ob/gyn ROS         Other   (+) arthritis   history of cancer (Adenocarcinoma of esophagus)                                  Anesthesia Plan    ASA 3     general and regional   (Popliteal for PO pain)  intravenous induction   Anesthetic plan and risks discussed with patient.

## 2017-10-03 NOTE — PLAN OF CARE
Problem: Patient Care Overview (Adult)  Goal: Plan of Care Review  Outcome: Ongoing (interventions implemented as appropriate)    10/03/17 0556   Coping/Psychosocial Response Interventions   Plan Of Care Reviewed With patient   Patient Care Overview   Progress improving

## 2017-10-03 NOTE — PLAN OF CARE
Problem: Patient Care Overview (Adult)  Goal: Discharge Needs Assessment  Outcome: Outcome(s) achieved Date Met:  10/03/17    10/03/17 1016   Discharge Needs Assessment   Concerns To Be Addressed no discharge needs identified   Equipment Needed After Discharge crutches, axillary   Discharge Disposition home or self-care   Self-Care   Equipment Currently Used at Home none   Living Environment   Transportation Available car;family or friend will provide   Current Health   Anticipated Changes Related to Illness none

## 2017-10-03 NOTE — PLAN OF CARE
Problem: Patient Care Overview (Adult)  Goal: Plan of Care Review  Outcome: Outcome(s) achieved Date Met:  10/03/17    10/03/17 1017   Coping/Psychosocial Response Interventions   Plan Of Care Reviewed With patient   Patient Care Overview   Progress improving

## 2017-10-03 NOTE — OP NOTE
Operative Note      Facility: [facility]  Patient Name: Jose Hurtado  YOB: 1950  Date: 10/3/2017  Medical Record Number: [id number]      Pre-op Diagnosis:   1.  Left second mallet toe status post previous hammertoe repair at the PIP joint with mild flexion deformity  2.  Left third mallet toe status post previous hammertoe repair at the PIP joint with mild flexion deformity  3.  Left fourth mallet toe status post previous hammertoe repair at the PIP jointwith mild flexion deformity    Post-op Diagnosis:     1.  Left second mallet toe status post previous hammertoe repair at the PIP joint with mild flexion deformity  2.  Left third mallet toe status post previous hammertoe repair at the PIP joint with mild flexion deformity  3.  Left fourth mallet toe status post previous hammertoe repair at the PIP jointwith mild flexion deformity      Procedure(s):  1.  Left second DIP joint fusion with flexor tenotomy  2.  Left third DIP joint fusion with flexor tenotomy  1.  Left fourth DIP joint fusion with flexor tenotomy  Surgeon(s):  Mulugeta Lira MD    Anesthesia: General  Anesthesiologist: Sumanth Diggs MD  CRNA: Astrid Pisano CRNA    Staff:   Circulator: Tammie Ambriz RN  Radiology Technologist: Savannah Sanchez RRT  Scrub Person: Caprice To  Assistants : none      Tourniquet time: None      Estimated Blood Loss: Minimal     Drains: None    Findings: See Dictation    Complications: None      Indications for Procedure: Patient has a history of left second third and fourth hammertoe corrections done elsewhere with some slight residual deformity at the PIP joint but has had recurrence of significant flexion deformity of the DIP joints with pain over the tips of his toes that rub in his shoes.  Although no guarantees could be given mutually decision was made to proceed with operative treatment after discussion of risks benefits potential outcomes and complications of  operative and nonoperative treatment options          Description of Procedure: Preoperative informed consent and anesthesia evaluation were obtained IV antibiotics were administered in the surgical site was marked.  Ankle block was administered by anesthesia.  He was brought to the operating room and placed in a supine position.  Anesthesia was induced LMA was positioned and well-padded tourniquet was placed over left distal leg but was never inflated.    The left foot was prepped and draped in a sterile fashion surgical timeout was performed.  I then carefully designed and performed an elliptical incision over the DIP joint of the second toe.  Skin ellipse and capsular ellipse was removed.Hemostasis was obtained.  I was then able to release the collateral ligaments and exposed the distal aspect of the middle phalanx.  This was unresectable of the condylar flare.  I then denuded the base of the distal phalanx of cartilage.  I then divided the plantar plate and under direct visualization divided the flexor tendons.  In doing so there was some increased extension to the PIP joint and I was able to bring the DIP joint into neutral alignment without undue tension.  It was irrigated with dilute Betadine solution.    The third toe was then approached where skin and capsular ellipses were removed from the DIP joint.  Hemostasis was obtained.  The collaterals were released and I was able to expose the distal aspect of the middle phalanx.  This was resected below the condylar flare with an oscillating saw.  I then denuded the base of the distal phalanx of cartilage.  The plantar plate was divided and the flexor tendons were divided under direct visualization and in doing so there was some increased extension to the PIP joint.  I was again able to bring the DIP joint into neutral alignment without undue tension with nice alignment.  It was irrigated with dilute Betadine solution.    The fourth toe was then approached there  was flexion as well as slight medial deviation to this.  A slightly oblique elliptical skin incision was then performed and skin and capsular ellipses were removed. Hemostasis was obtained. The collaterals were released and I exposed the distal aspect of the middle phalanx.  This was resected below the condylar flare at a very slight angle to account for the slight angular deformity.  The base of the distal phalanx was then denuded of cartilage. The plantar plate was divided and the flexor tendons were divided under direct visualization again with some relaxation to the PIP joint.  I was able to bring the DIP joint into neutral alignment without undue tension.  Wound was irrigated with dilute Betadine solution.    Each toe was then sequentially pinned but bringing a 0.054 mm K wire out to the base of the distal phalanx and then reducing the DIP joint anatomically and cross pinning this and extending the pin down to the base of the proximal phalanx.  This was done for each of the second third and fourth toes under x-ray guidance.  X-ray showed good alignment and containment of the hardware and clinically the toes were in a markedly improved position.    Each of the toes maintained brisk capillary refill and the tourniquet had never been inflated.    Each of the wounds were then closed with a central stitch incorporating the extensor tendon as well as medial and lateral interrupted sutures of 4-0 nylon.  The pins were bent and clipped and capped.  A sterile forefoot and ankle dressing was applied.  Postoperative shoe was placed.  All the toes maintained brisk capillary refill.  He was awakened,  transferred to the stretcher and taken to recovery in stable condition

## 2017-10-03 NOTE — PLAN OF CARE
Problem: Perioperative Period (Adult)  Goal: Signs and Symptoms of Listed Potential Problems Will be Absent or Manageable (Perioperative Period)  Outcome: Ongoing (interventions implemented as appropriate)    10/03/17 1005   Perioperative Period   Problems Assessed (Perioperative Period) pain;wound complications;hemorrhage;hypothermia;hypoxia/hypoxemia;perioperative injury;physiologic stress response;situational response   Problems Present (Perioperative Period) none

## 2017-10-03 NOTE — PLAN OF CARE
Problem: Perioperative Period (Adult)  Goal: Signs and Symptoms of Listed Potential Problems Will be Absent or Manageable (Perioperative Period)  Outcome: Ongoing (interventions implemented as appropriate)    10/03/17 0556   Perioperative Period   Problems Assessed (Perioperative Period) all   Problems Present (Perioperative Period) none

## 2017-10-03 NOTE — BRIEF OP NOTE
HAMMER TOE REPAIR  Progress Note    Jose Hurtado  10/3/2017    Pre-op Diagnosis:   Left mallet toes 2-4    Post-op Diagnosis:     Left foot mallet toes 2-4  Procedure/CPT® Codes:      Procedure(s):  Left second third and fourth distal interphalangeal joint resection with flexor tenotomy    Surgeon(s):  Mulugeta Lira MD    Anesthesia: General    Staff:   Circulator: Tammie Ambriz RN  Radiology Technologist: Savannah Sanchez RRT  Scrub Person: Caprice To    Estimated Blood Loss: minimal    Urine Voided: * No values recorded between 10/3/2017  7:26 AM and 10/3/2017  9:12 AM *    Specimens:                None      Drains:               Complications: none      Mulugeta Lira MD     Date: 10/3/2017  Time: 9:21 AM

## 2017-10-11 ENCOUNTER — OFFICE VISIT (OUTPATIENT)
Dept: ORTHOPEDIC SURGERY | Facility: CLINIC | Age: 67
End: 2017-10-11

## 2017-10-11 DIAGNOSIS — Z98.890 STATUS POST LEFT FOOT SURGERY: Primary | ICD-10-CM

## 2017-10-11 DIAGNOSIS — M20.42 OTHER HAMMER TOE(S) (ACQUIRED), LEFT FOOT: ICD-10-CM

## 2017-10-11 PROCEDURE — 99024 POSTOP FOLLOW-UP VISIT: CPT | Performed by: ORTHOPAEDIC SURGERY

## 2017-10-11 PROCEDURE — 73630 X-RAY EXAM OF FOOT: CPT | Performed by: ORTHOPAEDIC SURGERY

## 2017-10-11 PROCEDURE — 29540 STRAPPING ANKLE &/FOOT: CPT | Performed by: ORTHOPAEDIC SURGERY

## 2017-10-11 NOTE — PROGRESS NOTES
Foot Follow Up      Patient: Jose Hurtado    YOB: 1950 67 y.o. male    Chief Complaints: Foot pain    History of Present Illness: Patient had multiple left DIP joint fusion and flexor tenotomies of the second third and fourth toes from 10/3/17.  Overall he feels good and is only minimal complaints of minor aching pain.  He's been doing foot flat weightbearing partially with crutches.  HPI    ROS: Foot pain, no fevers chills chest pain or shortness of breath  Past Medical History:   Diagnosis Date   • Anti-phospholipid syndrome     On lifelong anticoagulation therapy   • Arthritis     RIGHT KNEE   • Bladder disorder     LEAKAGE   ON MED   • Community acquired pneumonia     HISTORY OF IN 2014   • Deep venous thrombosis 2006, 2008    Left lower extremity   • Esophageal carcinoma 12/31/2014    had chemo and radiation prior surgery   • Fatigue    • Hammer toe of left foot    • Hemorrhoids    • HH (hiatus hernia)    • History of atrial fibrillation 2015    ONE EPISODE WHILE HOSPITALIZED   • History of kidney stones    • History of nephrolithiasis    • History of pancreatitis     PT STATES MANY YEARS AGO   • History of radiation therapy    • Hypertension    • Long-term (current) use of anticoagulants, INR goal 2.0-3.0    • Malignant neoplasm of prostate    • Restless legs syndrome    • 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  Well developed with normal mood.Left second third and fourth toes are in neutral alignment at the DIP joint tendons appear to be well contained and not backed out clinically and are not abutting the skin.  There is brisk capillary refill to the tips of all the toes.  Clinically the toes are well aligned.  I tried to compress some at the DIP joint of each pin with minimal excursion.      Radiology: 3 views left foot ordered to evaluate alignment reviewed and compared with intraoperative x-rays.  Hardware  does not appear to be significantly changed in alignment and the pins traversed the DIP joint and down into the proximal phalanx.  There does appear to be some increased distraction at the second third and fourth PIP joints      Assessment/Plan:  Status post left second third and fourth mallet toe correction with flexor tenotomy and DIP joint fusion.  Overall he feels that his toes looks much better and feel better than they did preoperatively.  It's too early to take his stitches out.  He was placed back into a 4 foot and ankle hammertoe spica-type dressing.  He will continue with partial foot flat weightbearing using crutches.  He has a nice protective cover to protect the pins.    I'll see him back in 2 weeks' x-rays of his left foot.  We'll plan on suture and pin removal at that time.    Overall he and his wife told me how please they are so far with the outcome and with my care

## 2017-10-25 ENCOUNTER — OFFICE VISIT (OUTPATIENT)
Dept: ORTHOPEDIC SURGERY | Facility: CLINIC | Age: 67
End: 2017-10-25

## 2017-10-25 VITALS — HEIGHT: 76 IN | TEMPERATURE: 97.8 F | BODY MASS INDEX: 26.79 KG/M2 | WEIGHT: 220 LBS

## 2017-10-25 DIAGNOSIS — M20.42 OTHER HAMMER TOE(S) (ACQUIRED), LEFT FOOT: ICD-10-CM

## 2017-10-25 DIAGNOSIS — Z98.890 S/P FOOT SURGERY, LEFT: Primary | ICD-10-CM

## 2017-10-25 PROCEDURE — 29540 STRAPPING ANKLE &/FOOT: CPT | Performed by: ORTHOPAEDIC SURGERY

## 2017-10-25 PROCEDURE — 20670 REMOVAL IMPLANT SUPERFICIAL: CPT | Performed by: ORTHOPAEDIC SURGERY

## 2017-10-25 PROCEDURE — 73630 X-RAY EXAM OF FOOT: CPT | Performed by: ORTHOPAEDIC SURGERY

## 2017-10-25 PROCEDURE — 99024 POSTOP FOLLOW-UP VISIT: CPT | Performed by: ORTHOPAEDIC SURGERY

## 2017-10-25 NOTE — PROGRESS NOTES
"Foot Follow Up      Patient: Jose Hurtado    YOB: 1950 67 y.o. male    Chief Complaints: Foot doing fine    History of Present Illness: Follows up left second third and fourth DIP fusion and flexor tenotomy from 10/3/17.  Last seen on 10/11/17.  Since then he has been using a postoperative shoe and toe protector and doing only partial foot flat weightbearing.  Really no significant complaints regarding pain.  HPI    ROS: No Foot pain  Past Medical History:   Diagnosis Date   • Anti-phospholipid syndrome     On lifelong anticoagulation therapy   • Arthritis     RIGHT KNEE   • Bladder disorder     LEAKAGE   ON MED   • Community acquired pneumonia     HISTORY OF IN 2014   • Deep venous thrombosis 2006, 2008    Left lower extremity   • Esophageal carcinoma 12/31/2014    had chemo and radiation prior surgery   • Fatigue    • Hammer toe of left foot    • Hemorrhoids    • HH (hiatus hernia)    • History of atrial fibrillation 2015    ONE EPISODE WHILE HOSPITALIZED   • History of kidney stones    • History of nephrolithiasis    • History of pancreatitis     PT STATES MANY YEARS AGO   • History of radiation therapy    • Hypertension    • Long-term (current) use of anticoagulants, INR goal 2.0-3.0    • Malignant neoplasm of prostate    • Restless legs syndrome    • Squamous carcinoma     on the head   • Stasis dermatitis      Physical Exam:   Vitals:    10/25/17 0916   Temp: 97.8 °F (36.6 °C)   Weight: 220 lb (99.8 kg)   Height: 76\" (193 cm)     Well developed with normal mood.Left second third and fourth toes show neutral alignment.  All the pin sites are clear without erythema or drainage.  Only minimal swelling to the toes with neutral alignment to the DIP joints.  Brisk capillary refill to all toes      Radiology: 3 views of left foot ordered to evaluate postoperative alignment reviewed and compared with previous x-rays.  I don't see any change in alignment to the hardware.  There remains some distraction " DIP joint but there is neutral alignment.      Assessment/Plan:  Status post left second third and fourth PIP fusion for mallet toes.  Reviewed x-ray findings with him today and although we don't see good bony abutment as far as fusion toes are in neutral alignment and feel much better.    Sutures removed Steri-Strips are applied all of his pins removed from the second third and fourth toe without discomfort.  There was no bleeding or drainage from the pin sites.    He is placed back into a fore foot and ankle hammertoe spica-type dressing.  He will continue with partial foot flat weightbearing in the postoperative shoe and I will see him back in 2 weeks' x-rays of his left foot.    he may remove the dressing in one week for showering and will then alphonso tape all of his toes together.  He will continue with the postop shoe with toe protector    He and his wife told me how much they appreciate my care and how much better his toes feel.  Pain today is 0 out of 10

## 2017-10-30 ENCOUNTER — TELEPHONE (OUTPATIENT)
Dept: INTERNAL MEDICINE | Age: 67
End: 2017-10-30

## 2017-10-30 NOTE — TELEPHONE ENCOUNTER
----- Message from George Mooney MD sent at 10/29/2017  7:56 AM EDT -----  INR is therapeutic, continue present dosage of Coumadin, and repeat INR in 4 weeks.

## 2017-11-07 ENCOUNTER — TELEPHONE (OUTPATIENT)
Dept: INTERNAL MEDICINE | Age: 67
End: 2017-11-07

## 2017-11-07 NOTE — TELEPHONE ENCOUNTER
Astrid schilling/Dr.Tim Dupree, Dental Oral Surgeon is requesting a verbal consent for pt to come off his coumadin 3 days prior to teeth extraction.    Pls call Astrid schilling/[facility]-Dr.Tim Dupree, Dental Oral Surgeon #[phone] or F#[phone].

## 2017-11-07 NOTE — TELEPHONE ENCOUNTER
I think this will be safe to discontinue his Coumadin for 3 days.  Current literature does not support bridging in someone with prior venous thromboembolism unless it has occurred within 3 months.

## 2017-11-08 ENCOUNTER — OFFICE VISIT (OUTPATIENT)
Dept: ORTHOPEDIC SURGERY | Facility: CLINIC | Age: 67
End: 2017-11-08

## 2017-11-08 VITALS — HEIGHT: 77 IN | BODY MASS INDEX: 27.39 KG/M2 | WEIGHT: 232 LBS | TEMPERATURE: 98 F

## 2017-11-08 DIAGNOSIS — Z09 SURGERY FOLLOW-UP: Primary | ICD-10-CM

## 2017-11-08 PROCEDURE — 99024 POSTOP FOLLOW-UP VISIT: CPT | Performed by: ORTHOPAEDIC SURGERY

## 2017-11-08 PROCEDURE — 73630 X-RAY EXAM OF FOOT: CPT | Performed by: ORTHOPAEDIC SURGERY

## 2017-11-08 NOTE — PROGRESS NOTES
"Foot Follow Up      Patient: Jose Hurtado    YOB: 1950 67 y.o. male    Chief Complaints: Foot feeling better    History of Present Illness: Follows up left second third and fourth DIP fusions from 10/3/17 with flexor tenotomy's.    At last visit on 10/25/17 pins were removed.  He said his toes feel much better not really having any pain.  He's been using his postop shoe with a toe protector  HPI    ROS: No Foot pain  Past Medical History:   Diagnosis Date   • Anti-phospholipid syndrome     On lifelong anticoagulation therapy   • Arthritis     RIGHT KNEE   • Bladder disorder     LEAKAGE   ON MED   • Community acquired pneumonia     HISTORY OF IN 2014   • Deep venous thrombosis 2006, 2008    Left lower extremity   • Esophageal carcinoma 12/31/2014    had chemo and radiation prior surgery   • Fatigue    • Hammer toe of left foot    • Hemorrhoids    • HH (hiatus hernia)    • History of atrial fibrillation 2015    ONE EPISODE WHILE HOSPITALIZED   • History of kidney stones    • History of nephrolithiasis    • History of pancreatitis     PT STATES MANY YEARS AGO   • History of radiation therapy    • Hypertension    • Long-term (current) use of anticoagulants, INR goal 2.0-3.0    • Malignant neoplasm of prostate    • Restless legs syndrome    • 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  There is mild swelling to the toes but brisk capillary refill.      Radiology: 3 views of the left foot were ordered to evaluate alignment reviewed and compared with previous x-rays.  Does appear to be gapping at the PIP joints of each toe consistent with resection arthroplasty doesn't look like we " had good apposition as far as fusing but he is asymptomatic.      Assessment/Plan:  Status post left second third and fourth DIP fusion/resection arthroplasty with flexor tenotomy.  Overall he is very pleased with the alignment of his toes and is feeling much better.  He will continue alphonso taping all of his toes and will continue in his postop shoe with toe protector for another week and a half.  At that point he may progress to a stiff soled close toed wide deep shoe.    Follow-up in 3 weeks' x-rays left foot

## 2017-11-13 RX ORDER — PAROXETINE HYDROCHLORIDE 20 MG/1
TABLET, FILM COATED ORAL
Qty: 90 TABLET | Refills: 0 | Status: SHIPPED | OUTPATIENT
Start: 2017-11-13 | End: 2018-01-30 | Stop reason: SDUPTHER

## 2017-11-13 RX ORDER — WARFARIN SODIUM 5 MG/1
TABLET ORAL
Qty: 90 TABLET | Refills: 0 | Status: SHIPPED | OUTPATIENT
Start: 2017-11-13 | End: 2018-02-05 | Stop reason: SDUPTHER

## 2017-11-30 ENCOUNTER — OFFICE VISIT (OUTPATIENT)
Dept: ORTHOPEDIC SURGERY | Facility: CLINIC | Age: 67
End: 2017-11-30

## 2017-11-30 VITALS — HEIGHT: 77 IN | BODY MASS INDEX: 25.98 KG/M2 | TEMPERATURE: 97.6 F | WEIGHT: 220 LBS

## 2017-11-30 DIAGNOSIS — Z09 SURGERY FOLLOW-UP: Primary | ICD-10-CM

## 2017-11-30 PROCEDURE — 73630 X-RAY EXAM OF FOOT: CPT | Performed by: ORTHOPAEDIC SURGERY

## 2017-11-30 PROCEDURE — 99024 POSTOP FOLLOW-UP VISIT: CPT | Performed by: ORTHOPAEDIC SURGERY

## 2017-11-30 NOTE — PROGRESS NOTES
"Foot Follow Up      Patient: Jose Hurtado    YOB: 1950 67 y.o. male    Chief Complaints: Toes are doing great    History of Present Illness: Follows up left second third and fourth DIP fusion from 10/3/17 with flexor tenotomies.    Said his toes are doing great he can feel the mall and they're not hurting.    He is recently had an ingrown toenail addressed by a podiatrist on this side in the toe is bandaged but has not had any pain warmth or increased swelling to his lesser toes.       pHPI    ROS: Foot pain  Past Medical History:   Diagnosis Date   • Anti-phospholipid syndrome     On lifelong anticoagulation therapy   • Arthritis     RIGHT KNEE   • Bladder disorder     LEAKAGE   ON MED   • Community acquired pneumonia     HISTORY OF IN 2014   • Deep venous thrombosis 2006, 2008    Left lower extremity   • Esophageal carcinoma 12/31/2014    had chemo and radiation prior surgery   • Fatigue    • Hammer toe of left foot    • Hemorrhoids    • HH (hiatus hernia)    • History of atrial fibrillation 2015    ONE EPISODE WHILE HOSPITALIZED   • History of kidney stones    • History of nephrolithiasis    • History of pancreatitis     PT STATES MANY YEARS AGO   • History of radiation therapy    • Hypertension    • Long-term (current) use of anticoagulants, INR goal 2.0-3.0    • Malignant neoplasm of prostate    • Restless legs syndrome    • Squamous carcinoma     on the head   • Stasis dermatitis      Physical Exam:   Vitals:    11/30/17 1354   Temp: 97.6 °F (36.4 °C)   Weight: 220 lb (99.8 kg)   Height: 77\" (195.6 cm)     Well developed with normal mood.Nonantalgic gait in athletic shoes.  Great toe was bandaged but the visible portion of it did not show any obvious warmth erythema the left second third and fourth toes were neutral alignment with only minimal swelling and no erythema.  Brisk cap refill to all the toes all of the lesser toes are grossly sensate to light touch       Radiology: 3 views left foot " were ordered to evaluate alignment reviewed and compared with previous x-rays.  Alignment remains good to the DIP joints appears that he has pseudoarthrosis of these.  It is not symptomatic       Assessment/Plan:   Status post left second third and fourth toe DIP fusion with pseudoarthrosis.    Overall he seems be doing quite well and is not symptomatic and would not recommend anything further as far surgery.  He told me he is very pleased with his care and with his outcome.    Counseled him to continue in closed toed shoes for least another 6-8 weeks to avoid bumping or hitting these.    He'll follow-up with his podiatrist regarding his ingrown nail and I will see him back as needed

## 2018-01-30 ENCOUNTER — OFFICE VISIT (OUTPATIENT)
Dept: INTERNAL MEDICINE | Age: 68
End: 2018-01-30

## 2018-01-30 VITALS
SYSTOLIC BLOOD PRESSURE: 114 MMHG | BODY MASS INDEX: 28.97 KG/M2 | OXYGEN SATURATION: 98 % | DIASTOLIC BLOOD PRESSURE: 56 MMHG | TEMPERATURE: 97.6 F | HEART RATE: 75 BPM | WEIGHT: 245.4 LBS | HEIGHT: 77 IN

## 2018-01-30 DIAGNOSIS — I10 ESSENTIAL HYPERTENSION: ICD-10-CM

## 2018-01-30 DIAGNOSIS — Z00.00 MEDICARE ANNUAL WELLNESS VISIT, SUBSEQUENT: Primary | ICD-10-CM

## 2018-01-30 DIAGNOSIS — G47.00 INSOMNIA, UNSPECIFIED TYPE: ICD-10-CM

## 2018-01-30 DIAGNOSIS — F32.9 REACTIVE DEPRESSION: ICD-10-CM

## 2018-01-30 DIAGNOSIS — I82.5Y2 CHRONIC DEEP VEIN THROMBOSIS (DVT) OF PROXIMAL VEIN OF LEFT LOWER EXTREMITY (HCC): ICD-10-CM

## 2018-01-30 PROBLEM — E78.49 OTHER HYPERLIPIDEMIA: Status: ACTIVE | Noted: 2018-01-30

## 2018-01-30 LAB
CHOLEST SERPL-MCNC: 183 MG/DL (ref 0–200)
HDLC SERPL-MCNC: 55 MG/DL (ref 40–60)
LDLC SERPL CALC-MCNC: 113 MG/DL (ref 0–100)
TRIGL SERPL-MCNC: 74 MG/DL (ref 0–150)
VLDLC SERPL CALC-MCNC: 14.8 MG/DL (ref 5–40)

## 2018-01-30 PROCEDURE — G0439 PPPS, SUBSEQ VISIT: HCPCS | Performed by: INTERNAL MEDICINE

## 2018-01-30 PROCEDURE — 96160 PT-FOCUSED HLTH RISK ASSMT: CPT | Performed by: INTERNAL MEDICINE

## 2018-01-30 PROCEDURE — 99214 OFFICE O/P EST MOD 30 MIN: CPT | Performed by: INTERNAL MEDICINE

## 2018-01-30 RX ORDER — PAROXETINE 30 MG/1
30 TABLET, FILM COATED ORAL NIGHTLY
Qty: 30 TABLET | Refills: 2 | Status: SHIPPED | OUTPATIENT
Start: 2018-01-30 | End: 2018-05-10 | Stop reason: SDUPTHER

## 2018-01-30 RX ORDER — TRIAMTERENE AND HYDROCHLOROTHIAZIDE 75; 50 MG/1; MG/1
1 TABLET ORAL DAILY
Qty: 90 TABLET | Refills: 3 | Status: SHIPPED | OUTPATIENT
Start: 2018-01-30 | End: 2018-04-12 | Stop reason: SINTOL

## 2018-01-30 NOTE — PROGRESS NOTES
Subjective   Jose Hurtado is a 67 y.o. male.     History of Present Illness     Patient presents today for subsequent annual Medicare wellness evaluation.    Chronic DVT, maintained on Coumadin.  Patient monitors INR at home, last INR was 3 days ago with a value of 2.4.    Depression on Paxil he has compliant with medication, eating, getting up interacting with life daily but still notes problems with sleeping despite using Paxil at night.    Health maintenance: Last ophthalmology visit was December 2017, dentist within the past week.  Exercise stationary bike ×30 minutes daily.    History of esophageal cancer followed by thoracic surgery, Dr. Christine, next visit February 2018    History of prostate cancer: Followed by urology last visit within the past 2 weeks.    Other physician seen: Dermatology, oncology, gastroenterology.    Laboratory review September 2017 CMP creatinine 1.24, potassium 4.5, liver functions normal.  CBC hemoglobin 11.4, hematocrit 36.2, platelet count 101,000 (is followed by oncology).    The following portions of the patient's history were reviewed and updated as appropriate: allergies, current medications, past family history, past medical history, past social history, past surgical history and problem list.    Review of Systems   Constitutional: Negative.    HENT: Positive for hearing loss.         Patient is using hearing aids bilaterally.   Eyes: Negative.    Respiratory: Negative.    Cardiovascular: Negative.    Gastrointestinal: Negative.         Occasional constipation.  No difficulty swallowing noted.   Endocrine: Negative.    Genitourinary: Positive for frequency and urgency.        Treated by urology   Musculoskeletal: Positive for arthralgias.        Right knee no treatment desired at this time.   Allergic/Immunologic: Negative for immunocompromised state.   Neurological: Negative.    Hematological: Negative.    Psychiatric/Behavioral: Negative.        Objective   Physical Exam    Constitutional: He is oriented to person, place, and time. He appears well-developed. No distress.   O/W   HENT:   Head: Normocephalic and atraumatic.   Right Ear: Tympanic membrane, external ear and ear canal normal.   Left Ear: Tympanic membrane, external ear and ear canal normal.   Mouth/Throat: Uvula is midline, oropharynx is clear and moist and mucous membranes are normal.   Eyes: Conjunctivae and EOM are normal. Pupils are equal, round, and reactive to light.   Neck: Normal range of motion. Neck supple. No JVD present. Carotid bruit is not present. No thyromegaly present.   Cardiovascular: Normal rate, regular rhythm, normal heart sounds and intact distal pulses.  Exam reveals no gallop and no friction rub.    No murmur heard.  Pulmonary/Chest: Effort normal and breath sounds normal. He has no wheezes. He has no rales.   Abdominal: Soft. Bowel sounds are normal. There is no hepatosplenomegaly. There is no tenderness.   Genitourinary:   Genitourinary Comments: Defer to urology   Musculoskeletal: Normal range of motion. He exhibits edema. He exhibits no deformity.   Chronic left lower extremity edema status post DVT.   Lymphadenopathy:     He has no cervical adenopathy.   Neurological: He is alert and oriented to person, place, and time. He has normal strength and normal reflexes. No cranial nerve deficit or sensory deficit. Coordination normal.   Skin: Skin is warm and dry. No rash noted.   Psychiatric: He has a normal mood and affect. His speech is normal and behavior is normal. Judgment and thought content normal. Cognition and memory are normal.   Nursing note and vitals reviewed.      Assessment/Plan   Jose was seen today for annual exam.    Diagnoses and all orders for this visit:    Medicare annual wellness visit, subsequent  -     Lipid Panel    Chronic deep vein thrombosis (DVT) of proximal vein of left lower extremity    Reactive depression  -     PARoxetine (PAXIL) 30 MG tablet; Take 1 tablet by  mouth Every Night.    Insomnia, unspecified type      Number-one subsequent annual Medicare wellness evaluation, clinically stable, see comments above.  My main concern today is patient's weight, we did talk about watching his dietary intake.  Recommend lab as above, follow-up results by mail and repeat exam in one year.    #2 chronic DVT issues with left lower extremity, the only postphlebitic symptom is that of chronic swelling.  Continue Coumadin, patient will continue to monitor this at home.  Refill Maxide 7550×1 year.  Patient will continue to wear support hose, elevate leg and continue exercise.    #3 reactive depression and insomnia, we'll try increasing Paxil to 30 mg per day to be used at bedtime, an effort to improve sleep.  Patient will contact me in about one month by phone with an update on his symptoms.

## 2018-01-30 NOTE — PROGRESS NOTES
QUICK REFERENCE INFORMATION:  The ABCs of the Annual Wellness Visit    Subsequent Medicare Wellness Visit    HEALTH RISK ASSESSMENT    1950    Recent Hospitalizations:  No hospitalization(s) within the last year..        Current Medical Providers:  Patient Care Team:  George Mooney MD as PCP - General  George Phelan II, MD as Consulting Physician (Hematology and Oncology)  Jose Inman MD as Consulting Physician (Urology)  Mulugeta BLACKWELL MD as Consulting Physician (Gastroenterology)  Seth Solomon MD as Consulting Physician (Cardiology)  Jose Christine III, MD as Referring Physician (Thoracic Surgery)        Smoking Status:  History   Smoking Status   • Former Smoker   • Packs/day: 2.00   • Years: 2.00   • Types: Cigarettes   • Quit date: 1974   Smokeless Tobacco   • Never Used       Alcohol Consumption:  History   Alcohol Use   • 1.8 oz/week   • 1 Glasses of wine, 1 Cans of beer, 1 Shots of liquor per week     Comment: occassionally       Depression Screen:   PHQ-2/PHQ-9 Depression Screening 1/30/2018   Little interest or pleasure in doing things 0   Feeling down, depressed, or hopeless 0   Trouble falling or staying asleep, or sleeping too much -   Feeling tired or having little energy -   Poor appetite or overeating -   Feeling bad about yourself - or that you are a failure or have let yourself or your family down -   Trouble concentrating on things, such as reading the newspaper or watching television -   Moving or speaking so slowly that other people could have noticed. Or the opposite - being so fidgety or restless that you have been moving around a lot more than usual -   Thoughts that you would be better off dead, or of hurting yourself in some way -   Total Score 0   If you checked off any problems, how difficult have these problems made it for you to do your work, take care of things at home, or get along with other people? -       Health Habits and Functional and Cognitive  Screening:  Functional & Cognitive Status 1/30/2018   Do you have difficulty preparing food and eating? No   Do you have difficulty bathing yourself, getting dressed or grooming yourself? No   Do you have difficulty using the toilet? No   Do you have difficulty moving around from place to place? No   Do you have trouble with steps or getting out of a bed or a chair? No   In the past year have you fallen or experienced a near fall? No   Do you need help using the phone?  No   Are you deaf or do you have serious difficulty hearing?  Yes   Do you need help with transportation? No   Do you need help shopping? No   Do you need help preparing meals?  No   Do you need help with housework?  No   Do you need help with laundry? No   Do you need help taking your medications? No   Do you need help managing money? No   Have you felt unusual stress, anger or loneliness in the last month? No   Who do you live with? Spouse   If you need help, do you have trouble finding someone available to you? No   Do you have difficulty concentrating, remembering or making decisions? No           Does the patient have evidence of cognitive impairment? No    Aspirin use counseling: He is on Coumadin at this time      Recent Lab Results:  CMP:  Lab Results   Component Value Date    BUN 22 (H) 09/15/2017    CREATININE 1.30 09/15/2017    EGFRIFNONA 58 (L) 09/15/2017    EGFRIFAFRI  08/30/2016      Comment:      <15 Indicative of kidney failure.    BCR 17.7 09/15/2017     09/15/2017    K 4.5 09/15/2017    CO2 28.2 09/15/2017    CALCIUM 9.0 09/15/2017    ALBUMIN 3.70 09/15/2017    LABIL2 1.1 09/15/2017    BILITOT 0.3 09/15/2017    ALKPHOS 101 09/15/2017    AST 23 09/15/2017    ALT 19 09/15/2017     Lipid Panel:  Lab Results   Component Value Date    TRIG 47 01/26/2017    HDL 67 (H) 01/26/2017    VLDL 9.4 01/26/2017     HbA1c:       Visual Acuity:  No exam data present    Age-appropriate Screening Schedule:  Refer to the list below for future  screening recommendations based on patient's age, sex and/or medical conditions. Orders for these recommended tests are listed in the plan section. The patient has been provided with a written plan.    Health Maintenance   Topic Date Due   • TDAP/TD VACCINES (2 - Td) 02/01/2018   • COLONOSCOPY  06/13/2022   • INFLUENZA VACCINE  Completed   • PNEUMOCOCCAL VACCINES (65+ LOW/MEDIUM RISK)  Addressed   • ZOSTER VACCINE  Completed        Subjective   History of Present Illness    Jose Hurtado is a 67 y.o. male who presents for an Subsequent Wellness Visit.    The following portions of the patient's history were reviewed and updated as appropriate: allergies, current medications, past family history, past medical history, past social history, past surgical history and problem list.    Outpatient Medications Prior to Visit   Medication Sig Dispense Refill   • oxybutynin (DITROPAN) 5 MG tablet Take 5 mg by mouth 2 (Two) Times a Day.     • PARoxetine (PAXIL) 20 MG tablet TAKE 1 TABLET EVERY NIGHT 90 tablet 0   • pramipexole (MIRAPEX) 1.5 MG tablet Take 1.5 mg by mouth 2 (Two) Times a Day.     • triamterene-hydrochlorothiazide (MAXZIDE-25) 37.5-25 MG per tablet Take 2 tablets by mouth Daily. 60 tablet 3   • warfarin (COUMADIN) 5 MG tablet TAKE 1 TABLET EVERY DAY 90 tablet 0     No facility-administered medications prior to visit.        Patient Active Problem List   Diagnosis   • Adenocarcinoma of esophagus   • Adenomatous polyp of colon   • Anemia   • Duodenal ulcer   • Insomnia   • Malignant neoplasm of prostate   • RLS (restless legs syndrome)   • Thrombophlebitis of deep veins of lower extremity   • Ventral hernia without obstruction or gangrene   • Incisional hernia, without obstruction or gangrene   • Hernia, incisional   • Osteoarthrosis, localized, primary, knee   • BPH (benign prostatic hyperplasia)   • Stasis dermatitis   • Medicare annual wellness visit, subsequent   • Reactive depression   • DVT (deep venous  "thrombosis)   • Hammer toes of both feet   • Other hammer toe(s) (acquired), left foot   • Status post left foot surgery   • Surgery follow-up-Left second third and fourth distal interphalangeal joint resection with flexor tenotomy - Left       Advance Care Planning:  has an advance directive - a copy has been provided and is in file    Identification of Risk Factors:  Risk factors include: weight .    Review of Systems    Compared to one year ago, the patient feels his physical health is better.  Compared to one year ago, the patient feels his mental health is better.    Objective     Physical Exam    Vitals:    01/30/18 0800   BP: 114/56   Pulse: 75   Temp: 97.6 °F (36.4 °C)   SpO2: 98%   Weight: 111 kg (245 lb 6.4 oz)   Height: 195.6 cm (77.01\")   PainSc:   2   PainLoc: Knee       Body mass index is 29.09 kg/(m^2).  Discussed the patient's BMI with him. BMI is above normal parameters. Follow-up plan includes:  exercise counseling.    Assessment/Plan   Patient Self-Management and Personalized Health Advice  The patient has been provided with information about: diet, exercise and weight management and preventive services including:   · Diabetes screening, see lab orders, We discussed the new shingles vaccine and I have recommended that he obtain this..    Visit Diagnoses:    ICD-10-CM ICD-9-CM   1. Medicare annual wellness visit, subsequent Z00.00 V70.0   2. Chronic deep vein thrombosis (DVT) of proximal vein of left lower extremity I82.5Y2 453.51       No orders of the defined types were placed in this encounter.      Outpatient Encounter Prescriptions as of 1/30/2018   Medication Sig Dispense Refill   • oxybutynin (DITROPAN) 5 MG tablet Take 5 mg by mouth 2 (Two) Times a Day.     • PARoxetine (PAXIL) 20 MG tablet TAKE 1 TABLET EVERY NIGHT 90 tablet 0   • pramipexole (MIRAPEX) 1.5 MG tablet Take 1.5 mg by mouth 2 (Two) Times a Day.     • triamterene-hydrochlorothiazide (MAXZIDE-25) 37.5-25 MG per tablet Take 2 " tablets by mouth Daily. 60 tablet 3   • warfarin (COUMADIN) 5 MG tablet TAKE 1 TABLET EVERY DAY 90 tablet 0     No facility-administered encounter medications on file as of 1/30/2018.        Reviewed use of high risk medication in the elderly: yes  Reviewed for potential of harmful drug interactions in the elderly: yes    Follow Up:  1 YEAR    An After Visit Summary and PPPS with all of these plans were given to the patient.

## 2018-02-02 ENCOUNTER — TELEPHONE (OUTPATIENT)
Dept: INTERNAL MEDICINE | Age: 68
End: 2018-02-02

## 2018-02-02 NOTE — TELEPHONE ENCOUNTER
I calculated out his cardiovascular risk of stroke or heart attack over the next 10 years.  This formula was devised by the [employer] which encompasses blood pressure, cholesterol, HDL, treatment of hypertension, and age.  The definition of high risk is 7.5% or above.  Anything above 7.5 %, they suggest treating with medication.  His risk was 12%.  If there are are any other questions, please let me know.

## 2018-02-02 NOTE — TELEPHONE ENCOUNTER
Pt would like to know why starting medication is mentioned. He would like to know where the 7.5 measurement comes from.  Please advise. MM

## 2018-02-05 RX ORDER — WARFARIN SODIUM 5 MG/1
TABLET ORAL
Qty: 90 TABLET | Refills: 0 | Status: SHIPPED | OUTPATIENT
Start: 2018-02-05 | End: 2018-03-15

## 2018-02-14 ENCOUNTER — TELEPHONE (OUTPATIENT)
Dept: INTERNAL MEDICINE | Age: 68
End: 2018-02-14

## 2018-02-14 NOTE — TELEPHONE ENCOUNTER
"Pt called stating he has been experiencing \"dry mouth\" for several months but it seems to be getting worse. Pt asking if Dr. Mooney would want to consider alternative medications to his medications he takes?  Pt's # [phone]  Thanks SP  "

## 2018-02-15 NOTE — TELEPHONE ENCOUNTER
Have him discontinue the medication, see if this dry mouth improves, but the urinary frequency is going to worsen.  Have him contact us in one week with an update on how he is doing.

## 2018-02-15 NOTE — TELEPHONE ENCOUNTER
Dry mouth is most likely due to the oxybutynin.  Who  put him on this?  For what reason?  Is he willing to discontinue it?

## 2018-02-20 ENCOUNTER — OFFICE VISIT (OUTPATIENT)
Dept: ORTHOPEDIC SURGERY | Facility: CLINIC | Age: 68
End: 2018-02-20

## 2018-02-20 VITALS — HEIGHT: 77 IN | TEMPERATURE: 97.7 F | WEIGHT: 245.6 LBS | BODY MASS INDEX: 29 KG/M2

## 2018-02-20 DIAGNOSIS — M17.11 PRIMARY OSTEOARTHRITIS OF RIGHT KNEE: Primary | ICD-10-CM

## 2018-02-20 PROCEDURE — 99214 OFFICE O/P EST MOD 30 MIN: CPT | Performed by: ORTHOPAEDIC SURGERY

## 2018-02-20 RX ORDER — MELOXICAM 7.5 MG/1
15 TABLET ORAL ONCE
Status: CANCELLED | OUTPATIENT
Start: 2018-03-26 | End: 2018-03-26

## 2018-02-20 RX ORDER — PREGABALIN 25 MG/1
150 CAPSULE ORAL ONCE
Status: CANCELLED | OUTPATIENT
Start: 2018-03-26 | End: 2018-03-26

## 2018-02-20 RX ORDER — CEFAZOLIN SODIUM 2 G/100ML
2 INJECTION, SOLUTION INTRAVENOUS ONCE
Status: CANCELLED | OUTPATIENT
Start: 2018-03-26 | End: 2018-03-26

## 2018-02-20 NOTE — PROGRESS NOTES
"Patient: Jose Hurtado  YOB: 1950 68 y.o. male  Medical Record Number: [id number]    Chief Complaints:   Chief Complaint   Patient presents with   • Right Knee - Follow-up, Pain       History of Present Illness:Jose Hurtado is a 68 y.o. male who presents for follow-up of  Right knee pain which is severe and constant in nature.  He describes a stabbing aching pain about the medial and lateral aspect of the knee worse with weightbearing.  He is limited in his walking tolerance and can only walk short distances due to the pain.  Injections of been short-lived.  He does have a history of DVT in 2010.  He is on Coumadin    Allergies: No Known Allergies    Medications:   Current Outpatient Prescriptions   Medication Sig Dispense Refill   • PARoxetine (PAXIL) 30 MG tablet Take 1 tablet by mouth Every Night. 30 tablet 2   • pramipexole (MIRAPEX) 1.5 MG tablet Take 1.5 mg by mouth 2 (Two) Times a Day.     • triamterene-hydrochlorothiazide (MAXZIDE) 75-50 MG per tablet Take 1 tablet by mouth Daily. 90 tablet 3   • warfarin (COUMADIN) 5 MG tablet TAKE 1 TABLET EVERY DAY 90 tablet 0     No current facility-administered medications for this visit.          The following portions of the patient's history were reviewed and updated as appropriate: allergies, current medications, past family history, past medical history, past social history, past surgical history and problem list.    Review of Systems:   A 14 point review of systems was performed. All systems negative except pertinent positives/negative listed in HPI above    Physical Exam:   Vitals:    02/20/18 0819   Temp: 97.7 °F (36.5 °C)   TempSrc: Temporal Artery    Weight: 111 kg (245 lb 9.6 oz)   Height: 195.6 cm (77\")       General: A and O x 3, ASA, NAD    SCLERA:    Normal    DENTITION:   Normal  Knee:  right    ALIGNMENT:     Valgus      GAIT:    Antalgic    SKIN:    No abnormality    RANGE OF MOTION:   10  -  98   DEG    STRENGTH:   4  / 5    LIGAMENTS:    " No varus / valgus instability.   Negative  Lachman.    MENISCUS:     Negative   Jackie       DISTAL PULSES:    Paplable    DISTAL SENSATION :   Intact    LYMPHATICS:     No   lymphadenopathy    OTHER:          - Positive   effusion      - Crepitance with ROM     Radiology:  Xrays 3views right knee (ap,lateral, sunrise) were  reviewed for evaluation of knee pain demonstrating advanced valgus osteoarthritis with bone on bone articulation, subchondral cysts, and periarticular osteophytes      Assessment/Plan:    Right knee advanced end-stage osteoarthritis.  He has failed the full complement of conservative measures.  Continuation of conservative management vs. TKA discussed.  The patient wishes to proceed with total knee replacement.  At this point the patient has failed the full compliment of conservative treatment and stating complete understanding of the risks/benefits/ anternatives wishes to proceed with surgical treatment.    Risk and benefits of surgery were reviewed.  Including, but not limited to, blood clots or pulmonary embolism, anesthesia risk, infection, fracture, skin/leg numbness, persistent pain/crepitance/popping/catching, failure of the implant, need for future surgeries, hematoma, possible nerve or blood vessel injury, need for transfusion, and potential risk of stroke,heart attack or death, among others.  The patient understands and wishes to proceed.     It was explained that if tissue has been repaired or reconstructed, there is also an increased chance of failure which may require further management.  Following the completion of the discussion, the patient expressed understanding of this planned course of care, all their questions were answered and consent will be obtained preoperatively.    Operative Plan: right [employer] Total Knee Replacement a 1-2 day staywith home health rehab.  He will need a Lovenox bridge prior to surgery and has managed this with his primary care  physician in the past so I'll have him bring it up with his primary care physician.  I would ask that he not have a Lovenox dose the night before surgery.  Additionally we will start him on Lovenox and Coumadin postoperatively and will keep him in-house 2 days and ultrasound his legs prior to discharge home.

## 2018-02-22 PROBLEM — M17.11 PRIMARY OSTEOARTHRITIS OF RIGHT KNEE: Status: ACTIVE | Noted: 2018-02-22

## 2018-03-01 ENCOUNTER — TELEPHONE (OUTPATIENT)
Dept: ORTHOPEDIC SURGERY | Facility: CLINIC | Age: 68
End: 2018-03-01

## 2018-03-01 ENCOUNTER — TELEPHONE (OUTPATIENT)
Dept: INTERNAL MEDICINE | Age: 68
End: 2018-03-01

## 2018-03-01 NOTE — TELEPHONE ENCOUNTER
Pt called wanting to let Dr Mooney know he is having tkr on 26th and also dental work in a few days. Pt asked if you would call him back so he can talk to you.  Pt's # [phone]  Thanks SP

## 2018-03-02 ENCOUNTER — HOSPITAL ENCOUNTER (OUTPATIENT)
Dept: PET IMAGING | Facility: HOSPITAL | Age: 68
Discharge: HOME | End: 2018-03-02
Attending: INTERNAL MEDICINE | Admitting: INTERNAL MEDICINE

## 2018-03-02 DIAGNOSIS — C15.9 ADENOCARCINOMA OF ESOPHAGUS (HCC): ICD-10-CM

## 2018-03-02 LAB — CREAT BLDA-MCNC: 1.2 MG/DL (ref 0.6–1.3)

## 2018-03-02 PROCEDURE — 74177 CT ABD & PELVIS W/CONTRAST: CPT

## 2018-03-02 PROCEDURE — 0 DIATRIZOATE MEGLUMINE & SODIUM PER 1 ML: Performed by: INTERNAL MEDICINE

## 2018-03-02 PROCEDURE — 82565 ASSAY OF CREATININE: CPT

## 2018-03-02 PROCEDURE — 71260 CT THORAX DX C+: CPT

## 2018-03-02 PROCEDURE — 0 IOPAMIDOL 61 % SOLUTION: Performed by: INTERNAL MEDICINE

## 2018-03-02 RX ADMIN — IOPAMIDOL 85 ML: 612 INJECTION, SOLUTION INTRAVENOUS at 08:20

## 2018-03-02 RX ADMIN — DIATRIZOATE MEGLUMINE AND DIATRIZOATE SODIUM 30 ML: 660; 100 LIQUID ORAL; RECTAL at 07:30

## 2018-03-06 ENCOUNTER — TELEPHONE (OUTPATIENT)
Dept: INTERNAL MEDICINE | Age: 68
End: 2018-03-06

## 2018-03-06 NOTE — TELEPHONE ENCOUNTER
----- Message from George Mooney MD sent at 3/6/2018  8:34 AM EST -----  INR is therapeutic, continue present dosage of Coumadin, and repeat INR in 4 weeks.

## 2018-03-06 NOTE — TELEPHONE ENCOUNTER
----- Message from Mervat Marte sent at 3/6/2018  1:03 PM EST -----  Contact: [phone]  Dr. Mooney tried to call this patient but he was in the shower. He will be by his phone the rest of the day when he mas a min to call him back.

## 2018-03-06 NOTE — TELEPHONE ENCOUNTER
Knee surg is on 3/26/18. Pt wanting to know if he needs a bridge and also toot extraction prior to knee. Pt needs to know how many days to hold Coumadin prior to extraction. NEREIDA

## 2018-03-06 NOTE — TELEPHONE ENCOUNTER
Contact patient, I assume that he is talking about Tuesday 13 March.  Correctly if I'm wrong.  Tell her we will get back to him tomorrow after I have a chance to work out the game plan for the 2 surgical procedures.

## 2018-03-08 ENCOUNTER — OFFICE VISIT (OUTPATIENT)
Dept: ONCOLOGY | Facility: CLINIC | Age: 68
End: 2018-03-08

## 2018-03-08 ENCOUNTER — APPOINTMENT (OUTPATIENT)
Dept: LAB | Facility: HOSPITAL | Age: 68
End: 2018-03-08

## 2018-03-08 VITALS
TEMPERATURE: 98 F | DIASTOLIC BLOOD PRESSURE: 68 MMHG | HEIGHT: 75 IN | HEART RATE: 77 BPM | RESPIRATION RATE: 12 BRPM | BODY MASS INDEX: 31.38 KG/M2 | OXYGEN SATURATION: 98 % | SYSTOLIC BLOOD PRESSURE: 132 MMHG | WEIGHT: 252.4 LBS

## 2018-03-08 DIAGNOSIS — C15.9 ADENOCARCINOMA OF ESOPHAGUS (HCC): Primary | ICD-10-CM

## 2018-03-08 LAB
ALBUMIN SERPL-MCNC: 3.8 G/DL (ref 3.5–5.2)
ALBUMIN/GLOB SERPL: 1.1 G/DL (ref 1.1–2.4)
ALP SERPL-CCNC: 113 U/L (ref 38–116)
ALT SERPL W P-5'-P-CCNC: 19 U/L (ref 0–41)
ANION GAP SERPL CALCULATED.3IONS-SCNC: 8.9 MMOL/L
AST SERPL-CCNC: 26 U/L (ref 0–40)
BASOPHILS # BLD AUTO: 0.02 10*3/MM3 (ref 0–0.1)
BASOPHILS NFR BLD AUTO: 0.5 % (ref 0–1.1)
BILIRUB SERPL-MCNC: 0.2 MG/DL (ref 0.1–1.2)
BUN BLD-MCNC: 25 MG/DL (ref 6–20)
BUN/CREAT SERPL: 21.4 (ref 7.3–30)
CALCIUM SPEC-SCNC: 8.7 MG/DL (ref 8.5–10.2)
CHLORIDE SERPL-SCNC: 103 MMOL/L (ref 98–107)
CO2 SERPL-SCNC: 29.1 MMOL/L (ref 22–29)
CREAT BLD-MCNC: 1.17 MG/DL (ref 0.7–1.3)
DEPRECATED RDW RBC AUTO: 50.4 FL (ref 37–49)
EOSINOPHIL # BLD AUTO: 0.25 10*3/MM3 (ref 0–0.36)
EOSINOPHIL NFR BLD AUTO: 6.6 % (ref 1–5)
ERYTHROCYTE [DISTWIDTH] IN BLOOD BY AUTOMATED COUNT: 15.1 % (ref 11.7–14.5)
GFR SERPL CREATININE-BSD FRML MDRD: 62 ML/MIN/1.73
GLOBULIN UR ELPH-MCNC: 3.4 GM/DL (ref 1.8–3.5)
GLUCOSE BLD-MCNC: 103 MG/DL (ref 74–124)
HCT VFR BLD AUTO: 34.6 % (ref 40–49)
HGB BLD-MCNC: 10.6 G/DL (ref 13.5–16.5)
IMM GRANULOCYTES # BLD: 0.01 10*3/MM3 (ref 0–0.03)
IMM GRANULOCYTES NFR BLD: 0.3 % (ref 0–0.5)
LYMPHOCYTES # BLD AUTO: 0.97 10*3/MM3 (ref 1–3.5)
LYMPHOCYTES NFR BLD AUTO: 25.6 % (ref 20–49)
MCH RBC QN AUTO: 28 PG (ref 27–33)
MCHC RBC AUTO-ENTMCNC: 30.6 G/DL (ref 32–35)
MCV RBC AUTO: 91.3 FL (ref 83–97)
MONOCYTES # BLD AUTO: 0.38 10*3/MM3 (ref 0.25–0.8)
MONOCYTES NFR BLD AUTO: 10 % (ref 4–12)
NEUTROPHILS # BLD AUTO: 2.16 10*3/MM3 (ref 1.5–7)
NEUTROPHILS NFR BLD AUTO: 57 % (ref 39–75)
NRBC BLD MANUAL-RTO: 0 /100 WBC (ref 0–0)
PLATELET # BLD AUTO: 164 10*3/MM3 (ref 150–375)
PMV BLD AUTO: 8.5 FL (ref 8.9–12.1)
POTASSIUM BLD-SCNC: 4.1 MMOL/L (ref 3.5–4.7)
PROT SERPL-MCNC: 7.2 G/DL (ref 6.3–8)
RBC # BLD AUTO: 3.79 10*6/MM3 (ref 4.3–5.5)
SODIUM BLD-SCNC: 141 MMOL/L (ref 134–145)
WBC NRBC COR # BLD: 3.79 10*3/MM3 (ref 4–10)

## 2018-03-08 PROCEDURE — 80053 COMPREHEN METABOLIC PANEL: CPT | Performed by: INTERNAL MEDICINE

## 2018-03-08 PROCEDURE — 36415 COLL VENOUS BLD VENIPUNCTURE: CPT | Performed by: INTERNAL MEDICINE

## 2018-03-08 PROCEDURE — 99215 OFFICE O/P EST HI 40 MIN: CPT | Performed by: INTERNAL MEDICINE

## 2018-03-08 PROCEDURE — 85025 COMPLETE CBC W/AUTO DIFF WBC: CPT | Performed by: INTERNAL MEDICINE

## 2018-03-08 NOTE — PROGRESS NOTES
Subjective .     REASONS FOR FOLLOWUP:  Esophageal cancer, cytopenias     HISTORY OF PRESENT ILLNESS:  The patient is a 68 y.o. year old male  who is here for follow-up with the above-mentioned history.    Denies pain.  Eating well.  Denies weight loss.  Denies dysphagia or odynophagia.    Past Medical History:   Diagnosis Date   • Anemia    • Anti-phospholipid syndrome     On lifelong anticoagulation therapy   • Arthritis     RIGHT KNEE   • Bladder disorder     LEAKAGE   ON MED   • Community acquired pneumonia     HISTORY OF IN 2014   • Deep venous thrombosis 2006, 2008    Left lower extremity   • Depression    • Erectile dysfunction    • Esophageal carcinoma 12/31/2014    had chemo and radiation prior surgery   • Fatigue    • Hammer toe of left foot    • Hemorrhoids    • HH (hiatus hernia)    • History of atrial fibrillation 2015    ONE EPISODE WHILE HOSPITALIZED   • History of kidney stones    • History of nephrolithiasis    • History of pancreatitis     PT STATES MANY YEARS AGO   • History of radiation therapy    • Hypertension    • Long-term (current) use of anticoagulants, INR goal 2.0-3.0    • Malignant neoplasm of prostate    • Restless legs syndrome    • Squamous carcinoma     on the head   • Stasis dermatitis      Past Surgical History:   Procedure Laterality Date   • APPENDECTOMY  1950   • BRONCHOSCOPY      (Diagnostic)   • CATARACT EXTRACTION Bilateral 2014   • COLONOSCOPY  12/15/2014    Complete / Description: EH, IH, torts, stool, follow-up colonoscopy due in 5 years.   • COLONOSCOPY N/A 6/13/2017    Procedure: COLONOSCOPY TO CECUM AND INTO TERMINAL ILEUM;  Surgeon: Mulugeta BLACKWELL MD;  Location: [facility] ENDOSCOPY;  Service:    • ENDOSCOPY N/A 6/13/2017    Procedure: ESOPHAGOGASTRODUODENOSCOPY WITH COLD BIOPSY;  Surgeon: Lake Gonzalez MD;  Location: [facility] ENDOSCOPY;  Service:    • ESOPHAGECTOMY      April 2015, stage IIB esophageal carcinoma, sub-total resection.   • ESOPHAGECTOMY       Esophagectomy Subtotal [place] Joe Procedure   • EXCISION LESION  08/2012    Removal of Squamous Cell CA on Head   • FOOT SURGERY     • HAMMER TOE REPAIR  09/2014    Hammertoe Operation (Each Toe), 10/2014   • HAMMER TOE REPAIR Left 10/3/2017    Procedure: Left second third and fourth distal interphalangeal joint resection with flexor tenotomy;  Surgeon: Mulugeta Lira MD;  Location: [facility] OR [facility] [place];  Service:    • HERNIA REPAIR     • JEJUNOSTOMY      Laparoscopic   • KNEE SURGERY Bilateral 1967, 1973, 1981   • PROSTATECTOMY  2010   • PYLOROPLASTY     • SPINAL FUSION  02/1998    C 5,6   • TONSILLECTOMY  1955   • UPPER GASTROINTESTINAL ENDOSCOPY  12/15/2014    LA Grade D esophagitis, Pardo's, HH, multiple duodenal ulcers   • UPPER GASTROINTESTINAL ENDOSCOPY      (Therapeutic)   • VENTRAL/INCISIONAL HERNIA REPAIR N/A 4/14/2016    Procedure: VENTRAL/INCISIONAL HERNIA REPAIR, open, with mesh, and component separation;  Surgeon: Darren Rivas MD;  Location: [facility] OR;  Service:        HEMATOLOGIC/ONCOLOGIC HISTORY:  (History from previous dates can be found in the separate document.)    MEDICATIONS    Current Outpatient Prescriptions:   •  enoxaparin (LOVENOX) 100 MG/ML solution syringe, 100 mg SQ BID. 5 doses before surgery. 10 doses after surgery., Disp: 15 syringe, Rfl: 0  •  PARoxetine (PAXIL) 30 MG tablet, Take 1 tablet by mouth Every Night., Disp: 30 tablet, Rfl: 2  •  pramipexole (MIRAPEX) 1.5 MG tablet, Take 1.5 mg by mouth 2 (Two) Times a Day., Disp: , Rfl:   •  triamterene-hydrochlorothiazide (MAXZIDE) 75-50 MG per tablet, Take 1 tablet by mouth Daily., Disp: 90 tablet, Rfl: 3  •  warfarin (COUMADIN) 5 MG tablet, TAKE 1 TABLET EVERY DAY, Disp: 90 tablet, Rfl: 0    ALLERGIES:   No Known Allergies    SOCIAL HISTORY:       Social History     Social History   • Marital status:      Spouse name: Lena   • Number of children: 0   • Years of education: College     Occupational History  "  •  Self-Employed   •  Retired     Social History Main Topics   • Smoking status: Former Smoker     Packs/day: 2.00     Years: 2.00     Types: Cigarettes     Quit date: 1974   • Smokeless tobacco: Never Used   • Alcohol use 1.8 oz/week     1 Glasses of wine, 1 Cans of beer, 1 Shots of liquor per week      Comment: occassionally   • Drug use: No   • Sexual activity: Not on file     Other Topics Concern   • Not on file     Social History Narrative    self-employed          FAMILY HISTORY:  Family History   Problem Relation Age of Onset   • Cerebral aneurysm Mother      cerebral artery aneurysm   • Prostate cancer Brother 68   • Anxiety disorder Father    • Malig Hyperthermia Neg Hx        REVIEW OF SYSTEMS:    Review of Systems   Constitutional: Negative for activity change.   HENT: Negative for nosebleeds and trouble swallowing.    Respiratory: Negative for shortness of breath and wheezing.    Cardiovascular: Negative for chest pain and palpitations.   Gastrointestinal: Negative for constipation, diarrhea and nausea.   Genitourinary: Negative for dysuria and hematuria.   Musculoskeletal: Negative for arthralgias and myalgias.   Neurological: Negative for seizures and syncope.   Hematological: Negative for adenopathy. Does not bruise/bleed easily.   Psychiatric/Behavioral: Negative for confusion.           Objective    Vitals:    03/08/18 1228   BP: 132/68   Pulse: 77   Resp: 12   Temp: 98 °F (36.7 °C)   TempSrc: Oral   SpO2: 98%   Weight: 114 kg (252 lb 6.4 oz)   Height: 191 cm (75.2\")   PainSc: 3  Comment: RT knee pain     Current Status 3/8/2018   ECOG score 1      PHYSICAL EXAM:    CONSTITUTIONAL:  Vital signs reviewed.  No distress, looks comfortable.  EYES:  Conjunctiva and lids unremarkable.  PERRLA  EARS,NOSE,MOUTH,THROAT:  Ears and nose appear unremarkable.  Lips, teeth, gums appear unremarkable.  RESPIRATORY:  Normal respiratory effort.  Lungs clear to auscultation " bilaterally.  CARDIOVASCULAR:  Normal S1, S2.  No murmurs rubs or gallops.  No significant lower extremity edema.  GASTROINTESTINAL: Abdomen appears unremarkable.  Nontender.  No hepatomegaly.  No splenomegaly.  LYMPHATIC:  No cervical, supraclavicular, axillary lymphadenopathy.  MUSCULOSKELETAL:  Unremarkable gait and station.  Unremarkable digits/nails.  No cyanosis or clubbing.  SKIN:  Warm.  No rashes.  PSYCHIATRIC:  Normal judgment and insight.  Normal mood and affect.     RECENT LABS:        WBC   Date/Time Value Ref Range Status   09/15/2017 08:00 AM 3.11 (L) 4.00 - 10.00 10*3/mm3 Final     Hemoglobin   Date/Time Value Ref Range Status   09/15/2017 08:00 AM 11.4 (L) 13.5 - 16.5 g/dL Final     Platelets   Date/Time Value Ref Range Status   09/15/2017 08:00  (L) 150 - 375 10*3/mm3 Final       Assessment/Plan     ASSESSMENT:  Adenocarcinoma of esophagus  - CBC & Differential  - Comprehensive Metabolic Panel  - CBC & Differential  - Comprehensive Metabolic Panel    *Esophageal adenocarcinoma. Initially T2N0M0. After neoadjuvant carboplatin/Taxol with radiation, achieved a pathologic CR at resection, 4/24/2015.   As per NCCN guidelines, plan CAT scans every 6 months x1 year, then every 6 to 9 months on years 2 and years 3. Defer any surveillance EGDs to Dr. Gonzalez if he feels they are appropriate.   Also as per NCCN guidelines, plan H and P every 3 to 6 months on years 1 and years 2, then every 6 to 12 months' time on years 3 through years 5 and then annually.   Today I reviewed recent CT from 9/15/17 with him which shows no evidence of recurrence.  Images personally reviewed by me.  He appears to remain in remission.  EGD 6/13/17 by Dr. Gonzalez: No evidence of recurrence.  CT scan 3/2/18: No evidence of recurrence.  Images personally by me.  This was reviewed with the patient.  This completes 3 years of surveillance CT scans.  We'll not plan any more CT scans.    *Recurrent DVT, prior to cancer diagnosis.    George Mooney manages his Coumadin.      *Pancytopenia. He had a white blood cell count in the upper 3s and a hemoglobin in the upper 12s with a normal platelet count prior to beginning chemotherapy. We will monitor this.   CBC was not done as requested during CT scan.  I sent him back to the lab today to have this done.  Await results.    *Persistent cough.  He did not complain of this today.  He has seen Dr. Maher Sayied for this.      *Emesis occurring 5 hours after eating on average once every month or 2.  No nausea otherwise.  Denies dysphagia or odynophagia.  He did not complain of this today.      PLAN:  · M.D. CBC 6 months  · Return to lab today for CBC and CMP.  · No more surveillance CT scans.  · He does not have a port.    Wife assisted with history.

## 2018-03-09 ENCOUNTER — TELEPHONE (OUTPATIENT)
Dept: ONCOLOGY | Facility: HOSPITAL | Age: 68
End: 2018-03-09

## 2018-03-15 ENCOUNTER — APPOINTMENT (OUTPATIENT)
Dept: PREADMISSION TESTING | Facility: HOSPITAL | Age: 68
End: 2018-03-15

## 2018-03-15 VITALS
HEART RATE: 73 BPM | SYSTOLIC BLOOD PRESSURE: 127 MMHG | WEIGHT: 240 LBS | BODY MASS INDEX: 28.34 KG/M2 | DIASTOLIC BLOOD PRESSURE: 72 MMHG | OXYGEN SATURATION: 98 % | HEIGHT: 77 IN | RESPIRATION RATE: 20 BRPM | TEMPERATURE: 98.1 F

## 2018-03-15 DIAGNOSIS — M17.11 PRIMARY OSTEOARTHRITIS OF RIGHT KNEE: ICD-10-CM

## 2018-03-15 LAB
ANION GAP SERPL CALCULATED.3IONS-SCNC: 13.3 MMOL/L
BILIRUB UR QL STRIP: NEGATIVE
BUN BLD-MCNC: 23 MG/DL (ref 8–23)
BUN/CREAT SERPL: 19.8 (ref 7–25)
CALCIUM SPEC-SCNC: 9.6 MG/DL (ref 8.6–10.5)
CHLORIDE SERPL-SCNC: 94 MMOL/L (ref 98–107)
CLARITY UR: CLEAR
CO2 SERPL-SCNC: 27.7 MMOL/L (ref 22–29)
COLOR UR: YELLOW
CREAT BLD-MCNC: 1.16 MG/DL (ref 0.76–1.27)
DEPRECATED RDW RBC AUTO: 51.4 FL (ref 37–54)
ERYTHROCYTE [DISTWIDTH] IN BLOOD BY AUTOMATED COUNT: 15.3 % (ref 11.5–14.5)
GFR SERPL CREATININE-BSD FRML MDRD: 62 ML/MIN/1.73
GLUCOSE BLD-MCNC: 108 MG/DL (ref 65–99)
GLUCOSE UR STRIP-MCNC: NEGATIVE MG/DL
HCT VFR BLD AUTO: 36.1 % (ref 40.4–52.2)
HGB BLD-MCNC: 11.1 G/DL (ref 13.7–17.6)
HGB UR QL STRIP.AUTO: NEGATIVE
INR PPP: 1.36 (ref 0.9–1.1)
KETONES UR QL STRIP: NEGATIVE
LEUKOCYTE ESTERASE UR QL STRIP.AUTO: NEGATIVE
MCH RBC QN AUTO: 28.1 PG (ref 27–32.7)
MCHC RBC AUTO-ENTMCNC: 30.7 G/DL (ref 32.6–36.4)
MCV RBC AUTO: 91.4 FL (ref 79.8–96.2)
NITRITE UR QL STRIP: NEGATIVE
PH UR STRIP.AUTO: 6.5 [PH] (ref 5–8)
PLATELET # BLD AUTO: 164 10*3/MM3 (ref 140–500)
PMV BLD AUTO: 9.3 FL (ref 6–12)
POTASSIUM BLD-SCNC: 4.3 MMOL/L (ref 3.5–5.2)
PROT UR QL STRIP: NEGATIVE
PROTHROMBIN TIME: 16.5 SECONDS (ref 11.7–14.2)
RBC # BLD AUTO: 3.95 10*6/MM3 (ref 4.6–6)
SODIUM BLD-SCNC: 135 MMOL/L (ref 136–145)
SP GR UR STRIP: 1.01 (ref 1–1.03)
UROBILINOGEN UR QL STRIP: NORMAL
WBC NRBC COR # BLD: 3.44 10*3/MM3 (ref 4.5–10.7)

## 2018-03-15 PROCEDURE — 85610 PROTHROMBIN TIME: CPT | Performed by: ORTHOPAEDIC SURGERY

## 2018-03-15 PROCEDURE — 80048 BASIC METABOLIC PNL TOTAL CA: CPT | Performed by: ORTHOPAEDIC SURGERY

## 2018-03-15 PROCEDURE — 36415 COLL VENOUS BLD VENIPUNCTURE: CPT

## 2018-03-15 PROCEDURE — 85027 COMPLETE CBC AUTOMATED: CPT | Performed by: ORTHOPAEDIC SURGERY

## 2018-03-15 PROCEDURE — 81003 URINALYSIS AUTO W/O SCOPE: CPT | Performed by: ORTHOPAEDIC SURGERY

## 2018-03-15 RX ORDER — ACETAMINOPHEN 325 MG/1
650 TABLET ORAL EVERY 6 HOURS PRN
COMMUNITY
End: 2018-03-30 | Stop reason: HOSPADM

## 2018-03-15 RX ORDER — WARFARIN SODIUM 5 MG/1
5 TABLET ORAL
COMMUNITY
End: 2018-07-05 | Stop reason: SDUPTHER

## 2018-03-15 RX ORDER — CHLORHEXIDINE GLUCONATE 500 MG/1
1 CLOTH TOPICAL SEE ADMIN INSTRUCTIONS
COMMUNITY
End: 2018-03-30 | Stop reason: HOSPADM

## 2018-03-15 ASSESSMENT — KOOS JR
KOOS JR SCORE: 20
KOOS JR SCORE: 36.931

## 2018-03-15 NOTE — DISCHARGE INSTRUCTIONS
Take the following medications the morning of surgery with a small sip of water:        General Instructions:  • Do not eat solid food after midnight the night before surgery.  • You may drink clear liquids day of surgery but must stop at least one hour before your hospital arrival time.  • It is beneficial for you to have a clear drink that contains carbohydrates the day of surgery.  We suggest a 12 to 20 ounce bottle of Gatorade or Powerade for non-diabetic patients or a 12 to 20 ounce bottle of G2 or Powerade Zero for diabetic patients. (Pediatric patients, are not advised to drink a 12 to 20 ounce carbohydrate drink)    Clear liquids are liquids you can see through.  Nothing red in color.     Plain water                               Sports drinks  Sodas                                   Gelatin (Jell-O)  Fruit juices without pulp such as white grape juice and apple juice  Popsicles that contain no fruit or yogurt  Tea or coffee (no cream or milk added)  Gatorade / Powerade  G2 / Powerade Zero    • Infants may have breast milk up to four hours before surgery.  • Infants drinking formula may drink formula up to six hours before surgery.   • Patients who avoid smoking, chewing tobacco and alcohol for 4 weeks prior to surgery have a reduced risk of post-operative complications.  Quit smoking as many days before surgery as you can.  • Do not smoke, use chewing tobacco or drink alcohol the day of surgery.   • If applicable bring your C-PAP/ BI-PAP machine.  • Bring any papers given to you in the doctor’s office.  • Wear clean comfortable clothes and socks.  • Do not wear contact lenses or make-up.  Bring a case for your glasses.   • Bring crutches or walker if applicable.  • Remove all piercings.  Leave jewelry and any other valuables at home.  • Hair extensions with metal clips must be removed prior to surgery.  • The Pre-Admission Testing nurse will instruct you to bring medications if unable to obtain an accurate  list in Pre-Admission Testing.        If you were given a blood bank ID arm band remember to bring it with you the day of surgery.    Preventing a Surgical Site Infection:  • For 2 to 3 days before surgery, avoid shaving with a razor because the razor can irritate skin and make it easier to develop an infection.  • The night prior to surgery sleep in a clean bed with clean clothing.  Do not allow pets to sleep with you.  • Shower on the morning of surgery using a fresh bar of anti-bacterial soap (such as Dial) and clean washcloth.  Dry with a clean towel and dress in clean clothing.  • Ask your surgeon if you will be receiving antibiotics prior to surgery.  • Make sure you, your family, and all healthcare providers clean their hands with soap and water or an alcohol based hand  before caring for you or your wound.    Day of surgery:3/26/18  0900  Upon arrival, a Pre-op nurse and Anesthesiologist will review your health history, obtain vital signs, and answer questions you may have.  The only belongings needed at this time will be your home medications and if applicable your C-PAP/BI-PAP machine.  If you are staying overnight your family can leave the rest of your belongings in the car and bring them to your room later.  A Pre-op nurse will start an IV and you may receive medication in preparation for surgery, including something to help you relax.  Your family will be able to see you in the Pre-op area.  While you are in surgery your family should notify the waiting room  if they leave the waiting room area and provide a contact phone number.    Please be aware that surgery does come with discomfort.  We want to make every effort to control your discomfort so please discuss any uncontrolled symptoms with your nurse.   Your doctor will most likely have prescribed pain medications.      If you are going home after surgery you will receive individualized written care instructions before being  discharged.  A responsible adult must drive you to and from the hospital on the day of your surgery and stay with you for 24 hours.    If you are staying overnight following surgery, you will be transported to your hospital room following the recovery period.  [facility] has all private rooms.    If you have any questions please call Pre-Admission Testing at [phone].  Deductibles and co-payments are collected on the day of service. Please be prepared to pay the required co-pay, deductible or deposit on the day of service as defined by your plan.    2% CHLORAHEXIDINE GLUCONATE* CLOTH  Preparing or “prepping” skin before surgery can reduce the risk of infection at the surgical site. To make the process easier, [facility] has chosen disposable cloths moistened with a rinse-free, 2% Chlorhexidine Gluconate (CHG) antiseptic solution. The steps below outline the prepping process and should be carefully followed.        Use the prep cloth on the area that is circled in the diagram             Directions Night before Surgery  1) Shower using a fresh bar of anti-bacterial soap (such as Dial) and clean washcloth.  Use a clean towel to completely dry your skin.  2) Do not use any lotions, oils or creams on your skin.  3) Open the package and remove 1 cloth, wipe your skin for 30 seconds in a circular motion.  Allow to dry for 3 minutes.  4) Repeat #3 with second cloth.  5) Do not touch your eyes, ears, or mouth with the prep cloth.  6) Allow the wet prep solution to air dry.  7) Discard the prep cloth and wash your hands with soap and water.   8) Dress in clean bed clothes and sleep on fresh clean bed sheets.   9) You may experience some temporary itching after the prep.    Directions Day of Surgery  1) Repeat steps 1,2,3,4,5,6,7, and 9.   2) Dress in clean clothes before coming to the hospital.    BACTROBAN NASAL OINTMENT  There are many germs normally in your nose. Bactroban is an ointment that  will help reduce these germs. Please follow these instructions for Bactroban use:    ____Two days before surgery in the evening Date________    __1__The day before surgery in the morning  Date__3/25/18______    _2___The day before surgery in the evening              Date__3/25/18______    __3__The day of surgery in the morning    Date__3/26/18______    **Squirt ½ package of Bactroban Ointment onto a cotton applicator and apply to inside of 1st nostril.  Squirt the remaining Bactroban and apply to the inside of the other nostril.    PERIDEX- ORAL:  Use only if your surgeon has ordered  Use the night before and morning of surgery - Swish, gargle, and spit - do not swallow.

## 2018-03-22 ENCOUNTER — OFFICE VISIT (OUTPATIENT)
Dept: ORTHOPEDIC SURGERY | Facility: CLINIC | Age: 68
End: 2018-03-22

## 2018-03-22 VITALS
DIASTOLIC BLOOD PRESSURE: 72 MMHG | HEIGHT: 76 IN | BODY MASS INDEX: 29.03 KG/M2 | SYSTOLIC BLOOD PRESSURE: 140 MMHG | WEIGHT: 238.4 LBS | TEMPERATURE: 98.7 F

## 2018-03-22 DIAGNOSIS — M17.11 PRIMARY OSTEOARTHRITIS OF RIGHT KNEE: Primary | ICD-10-CM

## 2018-03-22 PROCEDURE — S0260 H&P FOR SURGERY: HCPCS | Performed by: NURSE PRACTITIONER

## 2018-03-22 PROCEDURE — 73562 X-RAY EXAM OF KNEE 3: CPT | Performed by: NURSE PRACTITIONER

## 2018-03-22 NOTE — H&P
Patient: Jose Hurtado    Date of Admission: 3/26/18    YOB: 1950    Medical Record Number: [id number]    Admitting Physician: Dr. Renny Solis    Reason for Admission: End Stage Right Knee OA    History of Present Illness: 68 y.o. male presents with severe end stage knee osteoarthritis which has not been responsive to the full compliment of conservative measures. Despite conservative attempts, there is still severe, constant activity limiting pain. Given the severity of the pain, the patient has elected to proceed with knee replacement.    Allergies: No Known Allergies      Current Medications:  Scheduled Meds:  PRN Meds:.    PMH:     Past Medical History:   Diagnosis Date   • Anemia    • Anti-phospholipid syndrome     On lifelong anticoagulation therapy   • Arthritis     RIGHT KNEE   • Bladder disorder     LEAKAGE   ON MED  wers pads   • Community acquired pneumonia     HISTORY OF IN 2014   • Deep venous thrombosis 2006, 2008    Left lower extremity multiple   • Depression    • Esophageal carcinoma 12/31/2014    had chemo and radiation prior surgery   • Fatigue    • Hammer toe of left foot     had surgery   • Hemorrhoids    • HH (hiatus hernia)    • History of atrial fibrillation 2015    ONE EPISODE WHILE HOSPITALIZED   • History of kidney stones    • History of nephrolithiasis    • History of pancreatitis     PT STATES MANY YEARS AGO   • History of radiation therapy    • Hypertension    • Long-term (current) use of anticoagulants, INR goal 2.0-3.0    • Malignant neoplasm of prostate    • Restless legs syndrome    • Squamous carcinoma     on the head   • Stasis dermatitis    • Warfarin anticoagulation        PF/Surg/Soc Hx:     Past Surgical History:   Procedure Laterality Date   • APPENDECTOMY  1950   • BRONCHOSCOPY      (Diagnostic)   • CATARACT EXTRACTION Bilateral 2014   • COLONOSCOPY  12/15/2014    Complete / Description: EH, IH, torts, stool, follow-up colonoscopy due in 5 years.   • COLONOSCOPY  N/A 6/13/2017    Procedure: COLONOSCOPY TO CECUM AND INTO TERMINAL ILEUM;  Surgeon: Mulugeta BLACKWELL MD;  Location: [facility] ENDOSCOPY;  Service:    • CYSTOSCOPY W/ LASER LITHOTRIPSY     • ENDOSCOPY N/A 6/13/2017    Procedure: ESOPHAGOGASTRODUODENOSCOPY WITH COLD BIOPSY;  Surgeon: Lake Gonzalez MD;  [facility];  Service:    • ESOPHAGECTOMY      April 2015, stage IIB esophageal carcinoma, sub-total resection.   • ESOPHAGECTOMY      Esophagectomy Subtotal [place] Joe Procedure   • EXCISION LESION  08/2012    Removal of Squamous Cell CA on Head   • HAMMER TOE REPAIR  09/2014    Hammertoe Operation (Each Toe), 10/2014   • HAMMER TOE REPAIR Left 10/3/2017    Procedure: Left second third and fourth distal interphalangeal joint resection with flexor tenotomy;  Surgeon: Mulugeta Lira MD;  Location:  [facility];  Service:    • HERNIA REPAIR      incisional   • JEJUNOSTOMY      Laparoscopic   • JEJUNOSTOMY      tube removal    • KNEE SURGERY Bilateral 1967, 1973, 1981   • PATELLA SURGERY Left     removed   • PILONIDAL CYST / SINUS EXCISION     • PROSTATECTOMY  2010   • PYLOROPLASTY     • SPINAL FUSION  02/1998    C 5,6   • TONSILLECTOMY  1955   • TONSILLECTOMY     • UPPER GASTROINTESTINAL ENDOSCOPY  12/15/2014    LA Grade D esophagitis, Pardo's, HH, multiple duodenal ulcers   • UPPER GASTROINTESTINAL ENDOSCOPY      (Therapeutic)   • VENTRAL/INCISIONAL HERNIA REPAIR N/A 4/14/2016    Procedure: VENTRAL/INCISIONAL HERNIA REPAIR, open, with mesh, and component separation;  Surgeon: Darren Rivas MD;  Location: [facility] MAIN OR;  Service:         Social History     Occupational History   •  Self-Employed   •  Retired     Social History Main Topics   • Smoking status: Former Smoker     Packs/day: 2.00     Years: 2.00     Types: Cigarettes     Quit date: 1974   • Smokeless tobacco: Never Used   • Alcohol use 1.8 oz/week     1 Glasses of wine, 1 Cans of beer, 1 Shots of liquor per  "week   • Drug use: No   • Sexual activity: Defer      Social History     Social History Narrative    self-employed         Family History   Problem Relation Age of Onset   • Cerebral aneurysm Mother      cerebral artery aneurysm   • Prostate cancer Brother 68   • Anxiety disorder Father    • Malig Hyperthermia Neg Hx          Review of Systems:   A 14 point review of systems was performed, pertinent positives discussed above, all other systems are negative    Physical Exam: 68 y.o. male  Vital Signs :   Vitals:    03/22/18 1418   BP: 140/72   BP Location: Left arm   Patient Position: Sitting   Cuff Size: Adult   Temp: 98.7 °F (37.1 °C)   TempSrc: Temporal Artery    Weight: 108 kg (238 lb 6.4 oz)   Height: 193 cm (76\")     General: Alert and Oriented x 3, No acute distress.  Psych: mood and affect appropriate; recent and remote memory intact  Eyes: conjunctiva clear; pupils equally round and reactive, sclera nonicteric  CV: no peripheral edema  Resp: normal respiratory effort  Skin: no rashes or wounds; normal turgor  Musculosketetal; pain and crepitance with knee range of motion  Vascular: palpable distal pulses    Xrays:  -3 views (AP, lateral, and sunrise)Right knee were ordered and reviewed demonstrating end-stage OA with bone on bone articulation.    Assessment:  End-stage Right knee osteoarthritis. Conservative measures have failed.      Plan:  The plan is to proceed with Right Total Knee Replacement. The patient voiced understanding of the risks, benefits, and alternative forms of treatment that were discussed with Dr Solis at the time of scheduling. Patient is planning on going home with home health after 1-2 day stay.  He has a history of DVT so T acid to begin locally, and we will resume Lovenox and Coumadin post operatively and ultrasound both legs prior to discharge    Luba Osuna, APRN  3/22/2018        "

## 2018-03-26 ENCOUNTER — ANESTHESIA (OUTPATIENT)
Dept: PERIOP | Facility: HOSPITAL | Age: 68
End: 2018-03-26

## 2018-03-26 ENCOUNTER — APPOINTMENT (OUTPATIENT)
Dept: GENERAL RADIOLOGY | Facility: HOSPITAL | Age: 68
End: 2018-03-26

## 2018-03-26 ENCOUNTER — ANESTHESIA EVENT (OUTPATIENT)
Dept: PERIOP | Facility: HOSPITAL | Age: 68
End: 2018-03-26

## 2018-03-26 ENCOUNTER — HOSPITAL ENCOUNTER (INPATIENT)
Facility: HOSPITAL | Age: 68
LOS: 4 days | Discharge: HOME HEALTH | End: 2018-03-30
Attending: ORTHOPAEDIC SURGERY | Admitting: ORTHOPAEDIC SURGERY

## 2018-03-26 DIAGNOSIS — M17.11 PRIMARY OSTEOARTHRITIS OF RIGHT KNEE: ICD-10-CM

## 2018-03-26 DIAGNOSIS — Z96.651 STATUS POST TOTAL RIGHT KNEE REPLACEMENT: Primary | ICD-10-CM

## 2018-03-26 PROBLEM — M17.9 OA (OSTEOARTHRITIS) OF KNEE: Status: ACTIVE | Noted: 2018-03-26

## 2018-03-26 LAB
APTT PPP: 39.9 SECONDS (ref 22.7–35.4)
INR PPP: 1.29 (ref 0.9–1.1)
PROTHROMBIN TIME: 15.8 SECONDS (ref 11.7–14.2)

## 2018-03-26 PROCEDURE — 73560 X-RAY EXAM OF KNEE 1 OR 2: CPT

## 2018-03-26 PROCEDURE — C1776 JOINT DEVICE (IMPLANTABLE): HCPCS | Performed by: ORTHOPAEDIC SURGERY

## 2018-03-26 PROCEDURE — 85730 THROMBOPLASTIN TIME PARTIAL: CPT | Performed by: ORTHOPAEDIC SURGERY

## 2018-03-26 PROCEDURE — 25010000002 NEOSTIGMINE PER 0.5 MG: Performed by: NURSE ANESTHETIST, CERTIFIED REGISTERED

## 2018-03-26 PROCEDURE — 25010000003 CEFAZOLIN IN DEXTROSE 2-4 GM/100ML-% SOLUTION: Performed by: NURSE PRACTITIONER

## 2018-03-26 PROCEDURE — 25010000002 MIDAZOLAM PER 1 MG: Performed by: ANESTHESIOLOGY

## 2018-03-26 PROCEDURE — 25010000003 CEFAZOLIN IN DEXTROSE 2-4 GM/100ML-% SOLUTION: Performed by: ORTHOPAEDIC SURGERY

## 2018-03-26 PROCEDURE — 85610 PROTHROMBIN TIME: CPT | Performed by: ORTHOPAEDIC SURGERY

## 2018-03-26 PROCEDURE — 25010000002 FENTANYL CITRATE (PF) 100 MCG/2ML SOLUTION: Performed by: NURSE ANESTHETIST, CERTIFIED REGISTERED

## 2018-03-26 PROCEDURE — 25010000002 DEXAMETHASONE PER 1 MG: Performed by: NURSE ANESTHETIST, CERTIFIED REGISTERED

## 2018-03-26 PROCEDURE — 25010000002 ENOXAPARIN PER 10 MG: Performed by: NURSE PRACTITIONER

## 2018-03-26 PROCEDURE — 0SRC0J9 REPLACEMENT OF RIGHT KNEE JOINT WITH SYNTHETIC SUBSTITUTE, CEMENTED, OPEN APPROACH: ICD-10-PCS | Performed by: ORTHOPAEDIC SURGERY

## 2018-03-26 PROCEDURE — 97162 PT EVAL MOD COMPLEX 30 MIN: CPT

## 2018-03-26 PROCEDURE — 25010000002 HYDROMORPHONE PER 4 MG: Performed by: NURSE ANESTHETIST, CERTIFIED REGISTERED

## 2018-03-26 PROCEDURE — 25010000002 PROPOFOL 10 MG/ML EMULSION: Performed by: NURSE ANESTHETIST, CERTIFIED REGISTERED

## 2018-03-26 PROCEDURE — 25010000002 FENTANYL CITRATE (PF) 100 MCG/2ML SOLUTION: Performed by: ANESTHESIOLOGY

## 2018-03-26 PROCEDURE — C1713 ANCHOR/SCREW BN/BN,TIS/BN: HCPCS | Performed by: ORTHOPAEDIC SURGERY

## 2018-03-26 PROCEDURE — 25010000002 KETOROLAC TROMETHAMINE PER 15 MG: Performed by: NURSE PRACTITIONER

## 2018-03-26 PROCEDURE — 25010000002 ROPIVACAINE PER 1 MG: Performed by: ANESTHESIOLOGY

## 2018-03-26 PROCEDURE — 25010000002 ONDANSETRON PER 1 MG: Performed by: NURSE ANESTHETIST, CERTIFIED REGISTERED

## 2018-03-26 PROCEDURE — 97110 THERAPEUTIC EXERCISES: CPT

## 2018-03-26 PROCEDURE — 25010000002 VANCOMYCIN 10 G RECONSTITUTED SOLUTION: Performed by: ORTHOPAEDIC SURGERY

## 2018-03-26 DEVICE — CMT BONE PALACOS 120001: Type: IMPLANTABLE DEVICE | Site: KNEE | Status: FUNCTIONAL

## 2018-03-26 DEVICE — GENESIS II NON-POROUS TIBIAL                                    BASEPLATE SIZE 7 RIGHT
Type: IMPLANTABLE DEVICE | Site: KNEE | Status: FUNCTIONAL
Brand: GENESIS II

## 2018-03-26 DEVICE — GEN II 7.5MM RESUR PAT 35MM
Type: IMPLANTABLE DEVICE | Site: KNEE | Status: FUNCTIONAL
Brand: GENESIS II

## 2018-03-26 DEVICE — IMPLANTABLE DEVICE: Type: IMPLANTABLE DEVICE | Site: KNEE | Status: FUNCTIONAL

## 2018-03-26 RX ORDER — EPHEDRINE SULFATE 50 MG/ML
INJECTION, SOLUTION INTRAVENOUS AS NEEDED
Status: DISCONTINUED | OUTPATIENT
Start: 2018-03-26 | End: 2018-03-26 | Stop reason: SURG

## 2018-03-26 RX ORDER — MAGNESIUM HYDROXIDE 1200 MG/15ML
LIQUID ORAL AS NEEDED
Status: DISCONTINUED | OUTPATIENT
Start: 2018-03-26 | End: 2018-03-26 | Stop reason: HOSPADM

## 2018-03-26 RX ORDER — HYDROMORPHONE HCL 110MG/55ML
PATIENT CONTROLLED ANALGESIA SYRINGE INTRAVENOUS AS NEEDED
Status: DISCONTINUED | OUTPATIENT
Start: 2018-03-26 | End: 2018-03-26 | Stop reason: SURG

## 2018-03-26 RX ORDER — PROMETHAZINE HYDROCHLORIDE 25 MG/ML
6.25 INJECTION, SOLUTION INTRAMUSCULAR; INTRAVENOUS ONCE AS NEEDED
Status: DISCONTINUED | OUTPATIENT
Start: 2018-03-26 | End: 2018-03-26 | Stop reason: HOSPADM

## 2018-03-26 RX ORDER — ONDANSETRON 4 MG/1
4 TABLET, ORALLY DISINTEGRATING ORAL EVERY 6 HOURS PRN
Status: DISCONTINUED | OUTPATIENT
Start: 2018-03-26 | End: 2018-03-30 | Stop reason: HOSPADM

## 2018-03-26 RX ORDER — ACETAMINOPHEN 10 MG/ML
1000 INJECTION, SOLUTION INTRAVENOUS ONCE
Status: COMPLETED | OUTPATIENT
Start: 2018-03-26 | End: 2018-03-26

## 2018-03-26 RX ORDER — ROPIVACAINE HYDROCHLORIDE 5 MG/ML
INJECTION, SOLUTION EPIDURAL; INFILTRATION; PERINEURAL AS NEEDED
Status: DISCONTINUED | OUTPATIENT
Start: 2018-03-26 | End: 2018-03-26 | Stop reason: SURG

## 2018-03-26 RX ORDER — HYDROCODONE BITARTRATE AND ACETAMINOPHEN 7.5; 325 MG/1; MG/1
1 TABLET ORAL EVERY 4 HOURS PRN
Status: DISCONTINUED | OUTPATIENT
Start: 2018-03-26 | End: 2018-03-30 | Stop reason: HOSPADM

## 2018-03-26 RX ORDER — FLUMAZENIL 0.1 MG/ML
0.2 INJECTION INTRAVENOUS AS NEEDED
Status: DISCONTINUED | OUTPATIENT
Start: 2018-03-26 | End: 2018-03-26 | Stop reason: HOSPADM

## 2018-03-26 RX ORDER — TRIAMTERENE AND HYDROCHLOROTHIAZIDE 75; 50 MG/1; MG/1
1 TABLET ORAL NIGHTLY
Status: DISCONTINUED | OUTPATIENT
Start: 2018-03-26 | End: 2018-03-30 | Stop reason: HOSPADM

## 2018-03-26 RX ORDER — FENTANYL CITRATE 50 UG/ML
INJECTION, SOLUTION INTRAMUSCULAR; INTRAVENOUS AS NEEDED
Status: DISCONTINUED | OUTPATIENT
Start: 2018-03-26 | End: 2018-03-26 | Stop reason: SURG

## 2018-03-26 RX ORDER — PREGABALIN 75 MG/1
150 CAPSULE ORAL ONCE
Status: COMPLETED | OUTPATIENT
Start: 2018-03-26 | End: 2018-03-26

## 2018-03-26 RX ORDER — ONDANSETRON 2 MG/ML
4 INJECTION INTRAMUSCULAR; INTRAVENOUS ONCE AS NEEDED
Status: DISCONTINUED | OUTPATIENT
Start: 2018-03-26 | End: 2018-03-26 | Stop reason: HOSPADM

## 2018-03-26 RX ORDER — WARFARIN SODIUM 5 MG/1
5 TABLET ORAL
Status: DISCONTINUED | OUTPATIENT
Start: 2018-03-26 | End: 2018-03-30 | Stop reason: HOSPADM

## 2018-03-26 RX ORDER — FENTANYL CITRATE 50 UG/ML
50 INJECTION, SOLUTION INTRAMUSCULAR; INTRAVENOUS
Status: DISCONTINUED | OUTPATIENT
Start: 2018-03-26 | End: 2018-03-26 | Stop reason: HOSPADM

## 2018-03-26 RX ORDER — MIDAZOLAM HYDROCHLORIDE 1 MG/ML
1 INJECTION INTRAMUSCULAR; INTRAVENOUS
Status: DISCONTINUED | OUTPATIENT
Start: 2018-03-26 | End: 2018-03-26 | Stop reason: HOSPADM

## 2018-03-26 RX ORDER — PROPOFOL 10 MG/ML
VIAL (ML) INTRAVENOUS AS NEEDED
Status: DISCONTINUED | OUTPATIENT
Start: 2018-03-26 | End: 2018-03-26 | Stop reason: SURG

## 2018-03-26 RX ORDER — ALBUTEROL SULFATE 2.5 MG/3ML
2.5 SOLUTION RESPIRATORY (INHALATION) ONCE AS NEEDED
Status: DISCONTINUED | OUTPATIENT
Start: 2018-03-26 | End: 2018-03-26 | Stop reason: HOSPADM

## 2018-03-26 RX ORDER — KETOROLAC TROMETHAMINE 30 MG/ML
15 INJECTION, SOLUTION INTRAMUSCULAR; INTRAVENOUS EVERY 8 HOURS
Status: DISPENSED | OUTPATIENT
Start: 2018-03-26 | End: 2018-03-27

## 2018-03-26 RX ORDER — LABETALOL HYDROCHLORIDE 5 MG/ML
5 INJECTION, SOLUTION INTRAVENOUS
Status: DISCONTINUED | OUTPATIENT
Start: 2018-03-26 | End: 2018-03-26 | Stop reason: HOSPADM

## 2018-03-26 RX ORDER — HYDROCODONE BITARTRATE AND ACETAMINOPHEN 7.5; 325 MG/1; MG/1
2 TABLET ORAL EVERY 4 HOURS PRN
Status: DISCONTINUED | OUTPATIENT
Start: 2018-03-26 | End: 2018-03-30 | Stop reason: HOSPADM

## 2018-03-26 RX ORDER — PAROXETINE 30 MG/1
30 TABLET, FILM COATED ORAL NIGHTLY
Status: DISCONTINUED | OUTPATIENT
Start: 2018-03-26 | End: 2018-03-30 | Stop reason: HOSPADM

## 2018-03-26 RX ORDER — MELOXICAM 15 MG/1
15 TABLET ORAL ONCE
Status: COMPLETED | OUTPATIENT
Start: 2018-03-26 | End: 2018-03-26

## 2018-03-26 RX ORDER — PRAMIPEXOLE DIHYDROCHLORIDE 1.5 MG/1
1.5 TABLET ORAL EVERY 12 HOURS SCHEDULED
Status: DISCONTINUED | OUTPATIENT
Start: 2018-03-26 | End: 2018-03-30 | Stop reason: HOSPADM

## 2018-03-26 RX ORDER — LIDOCAINE HYDROCHLORIDE 10 MG/ML
0.5 INJECTION, SOLUTION EPIDURAL; INFILTRATION; INTRACAUDAL; PERINEURAL ONCE AS NEEDED
Status: DISCONTINUED | OUTPATIENT
Start: 2018-03-26 | End: 2018-03-26 | Stop reason: HOSPADM

## 2018-03-26 RX ORDER — UREA 10 %
3 LOTION (ML) TOPICAL NIGHTLY PRN
Status: DISCONTINUED | OUTPATIENT
Start: 2018-03-26 | End: 2018-03-30 | Stop reason: HOSPADM

## 2018-03-26 RX ORDER — NALOXONE HCL 0.4 MG/ML
0.2 VIAL (ML) INJECTION AS NEEDED
Status: DISCONTINUED | OUTPATIENT
Start: 2018-03-26 | End: 2018-03-26 | Stop reason: HOSPADM

## 2018-03-26 RX ORDER — MIDAZOLAM HYDROCHLORIDE 1 MG/ML
2 INJECTION INTRAMUSCULAR; INTRAVENOUS
Status: DISCONTINUED | OUTPATIENT
Start: 2018-03-26 | End: 2018-03-26 | Stop reason: HOSPADM

## 2018-03-26 RX ORDER — DIPHENHYDRAMINE HYDROCHLORIDE 50 MG/ML
12.5 INJECTION INTRAMUSCULAR; INTRAVENOUS
Status: DISCONTINUED | OUTPATIENT
Start: 2018-03-26 | End: 2018-03-26 | Stop reason: HOSPADM

## 2018-03-26 RX ORDER — LIDOCAINE HYDROCHLORIDE 20 MG/ML
INJECTION, SOLUTION INFILTRATION; PERINEURAL AS NEEDED
Status: DISCONTINUED | OUTPATIENT
Start: 2018-03-26 | End: 2018-03-26 | Stop reason: SURG

## 2018-03-26 RX ORDER — SODIUM CHLORIDE 0.9 % (FLUSH) 0.9 %
1-10 SYRINGE (ML) INJECTION AS NEEDED
Status: DISCONTINUED | OUTPATIENT
Start: 2018-03-26 | End: 2018-03-26 | Stop reason: HOSPADM

## 2018-03-26 RX ORDER — ONDANSETRON 2 MG/ML
INJECTION INTRAMUSCULAR; INTRAVENOUS AS NEEDED
Status: DISCONTINUED | OUTPATIENT
Start: 2018-03-26 | End: 2018-03-26 | Stop reason: SURG

## 2018-03-26 RX ORDER — ONDANSETRON 4 MG/1
4 TABLET, FILM COATED ORAL EVERY 6 HOURS PRN
Status: DISCONTINUED | OUTPATIENT
Start: 2018-03-26 | End: 2018-03-30 | Stop reason: HOSPADM

## 2018-03-26 RX ORDER — OXYCODONE AND ACETAMINOPHEN 7.5; 325 MG/1; MG/1
1 TABLET ORAL ONCE AS NEEDED
Status: DISCONTINUED | OUTPATIENT
Start: 2018-03-26 | End: 2018-03-26 | Stop reason: HOSPADM

## 2018-03-26 RX ORDER — HYDRALAZINE HYDROCHLORIDE 20 MG/ML
5 INJECTION INTRAMUSCULAR; INTRAVENOUS
Status: DISCONTINUED | OUTPATIENT
Start: 2018-03-26 | End: 2018-03-26 | Stop reason: HOSPADM

## 2018-03-26 RX ORDER — BISACODYL 10 MG
10 SUPPOSITORY, RECTAL RECTAL DAILY PRN
Status: DISCONTINUED | OUTPATIENT
Start: 2018-03-26 | End: 2018-03-30 | Stop reason: HOSPADM

## 2018-03-26 RX ORDER — HYDROMORPHONE HCL 110MG/55ML
0.5 PATIENT CONTROLLED ANALGESIA SYRINGE INTRAVENOUS
Status: DISCONTINUED | OUTPATIENT
Start: 2018-03-26 | End: 2018-03-26 | Stop reason: HOSPADM

## 2018-03-26 RX ORDER — ROCURONIUM BROMIDE 10 MG/ML
INJECTION, SOLUTION INTRAVENOUS AS NEEDED
Status: DISCONTINUED | OUTPATIENT
Start: 2018-03-26 | End: 2018-03-26 | Stop reason: SURG

## 2018-03-26 RX ORDER — PANTOPRAZOLE SODIUM 40 MG/1
40 TABLET, DELAYED RELEASE ORAL EVERY MORNING
Status: DISCONTINUED | OUTPATIENT
Start: 2018-03-27 | End: 2018-03-30 | Stop reason: HOSPADM

## 2018-03-26 RX ORDER — CEFAZOLIN SODIUM 2 G/100ML
2 INJECTION, SOLUTION INTRAVENOUS EVERY 8 HOURS
Status: COMPLETED | OUTPATIENT
Start: 2018-03-26 | End: 2018-03-27

## 2018-03-26 RX ORDER — GLYCOPYRROLATE 0.2 MG/ML
INJECTION INTRAMUSCULAR; INTRAVENOUS AS NEEDED
Status: DISCONTINUED | OUTPATIENT
Start: 2018-03-26 | End: 2018-03-26 | Stop reason: SURG

## 2018-03-26 RX ORDER — DEXAMETHASONE SODIUM PHOSPHATE 10 MG/ML
INJECTION INTRAMUSCULAR; INTRAVENOUS AS NEEDED
Status: DISCONTINUED | OUTPATIENT
Start: 2018-03-26 | End: 2018-03-26 | Stop reason: SURG

## 2018-03-26 RX ORDER — FAMOTIDINE 10 MG/ML
20 INJECTION, SOLUTION INTRAVENOUS ONCE
Status: COMPLETED | OUTPATIENT
Start: 2018-03-26 | End: 2018-03-26

## 2018-03-26 RX ORDER — MELOXICAM 7.5 MG/1
7.5 TABLET ORAL DAILY
Status: DISCONTINUED | OUTPATIENT
Start: 2018-03-27 | End: 2018-03-30 | Stop reason: HOSPADM

## 2018-03-26 RX ORDER — ACETAMINOPHEN 325 MG/1
650 TABLET ORAL ONCE AS NEEDED
Status: DISCONTINUED | OUTPATIENT
Start: 2018-03-26 | End: 2018-03-26 | Stop reason: HOSPADM

## 2018-03-26 RX ORDER — PROMETHAZINE HYDROCHLORIDE 25 MG/1
25 SUPPOSITORY RECTAL ONCE AS NEEDED
Status: DISCONTINUED | OUTPATIENT
Start: 2018-03-26 | End: 2018-03-26 | Stop reason: HOSPADM

## 2018-03-26 RX ORDER — WOUND DRESSING ADHESIVE - LIQUID
LIQUID MISCELLANEOUS AS NEEDED
Status: DISCONTINUED | OUTPATIENT
Start: 2018-03-26 | End: 2018-03-26 | Stop reason: HOSPADM

## 2018-03-26 RX ORDER — SODIUM CHLORIDE, SODIUM LACTATE, POTASSIUM CHLORIDE, CALCIUM CHLORIDE 600; 310; 30; 20 MG/100ML; MG/100ML; MG/100ML; MG/100ML
9 INJECTION, SOLUTION INTRAVENOUS CONTINUOUS
Status: DISCONTINUED | OUTPATIENT
Start: 2018-03-26 | End: 2018-03-26 | Stop reason: HOSPADM

## 2018-03-26 RX ORDER — PROMETHAZINE HYDROCHLORIDE 25 MG/1
25 TABLET ORAL ONCE AS NEEDED
Status: DISCONTINUED | OUTPATIENT
Start: 2018-03-26 | End: 2018-03-26 | Stop reason: HOSPADM

## 2018-03-26 RX ORDER — CEFAZOLIN SODIUM 2 G/100ML
2 INJECTION, SOLUTION INTRAVENOUS ONCE
Status: COMPLETED | OUTPATIENT
Start: 2018-03-26 | End: 2018-03-26

## 2018-03-26 RX ORDER — DOCUSATE SODIUM 100 MG/1
100 CAPSULE, LIQUID FILLED ORAL 2 TIMES DAILY
Status: DISCONTINUED | OUTPATIENT
Start: 2018-03-26 | End: 2018-03-30 | Stop reason: HOSPADM

## 2018-03-26 RX ORDER — ONDANSETRON 2 MG/ML
4 INJECTION INTRAMUSCULAR; INTRAVENOUS EVERY 6 HOURS PRN
Status: DISCONTINUED | OUTPATIENT
Start: 2018-03-26 | End: 2018-03-30 | Stop reason: HOSPADM

## 2018-03-26 RX ADMIN — PROPOFOL 200 MG: 10 INJECTION, EMULSION INTRAVENOUS at 11:17

## 2018-03-26 RX ADMIN — FENTANYL CITRATE 25 MCG: 50 INJECTION INTRAMUSCULAR; INTRAVENOUS at 12:08

## 2018-03-26 RX ADMIN — ACETAMINOPHEN 1000 MG: 10 INJECTION, SOLUTION INTRAVENOUS at 11:30

## 2018-03-26 RX ADMIN — NEOSTIGMINE METHYLSULFATE 2.5 MG: 1 INJECTION INTRAMUSCULAR; INTRAVENOUS; SUBCUTANEOUS at 13:07

## 2018-03-26 RX ADMIN — FAMOTIDINE 20 MG: 10 INJECTION INTRAVENOUS at 11:00

## 2018-03-26 RX ADMIN — SODIUM CHLORIDE, POTASSIUM CHLORIDE, SODIUM LACTATE AND CALCIUM CHLORIDE: 600; 310; 30; 20 INJECTION, SOLUTION INTRAVENOUS at 12:45

## 2018-03-26 RX ADMIN — PRAMIPEXOLE DIHYDROCHLORIDE 1.5 MG: 1.5 TABLET ORAL at 21:41

## 2018-03-26 RX ADMIN — MIDAZOLAM 1 MG: 1 INJECTION INTRAMUSCULAR; INTRAVENOUS at 10:58

## 2018-03-26 RX ADMIN — LIDOCAINE HYDROCHLORIDE 100 MG: 20 INJECTION, SOLUTION INFILTRATION; PERINEURAL at 11:17

## 2018-03-26 RX ADMIN — VANCOMYCIN HYDROCHLORIDE 1750 MG: 10 INJECTION, POWDER, LYOPHILIZED, FOR SOLUTION INTRAVENOUS at 10:15

## 2018-03-26 RX ADMIN — SODIUM CHLORIDE, POTASSIUM CHLORIDE, SODIUM LACTATE AND CALCIUM CHLORIDE: 600; 310; 30; 20 INJECTION, SOLUTION INTRAVENOUS at 11:45

## 2018-03-26 RX ADMIN — HYDROCODONE BITARTRATE AND ACETAMINOPHEN 1 TABLET: 7.5; 325 TABLET ORAL at 18:58

## 2018-03-26 RX ADMIN — DEXAMETHASONE SODIUM PHOSPHATE 8 MG: 10 INJECTION INTRAMUSCULAR; INTRAVENOUS at 11:33

## 2018-03-26 RX ADMIN — DOCUSATE SODIUM 100 MG: 100 CAPSULE, LIQUID FILLED ORAL at 21:41

## 2018-03-26 RX ADMIN — MAXZIDE 1 TABLET: 75; 50 TABLET ORAL at 21:41

## 2018-03-26 RX ADMIN — ENOXAPARIN SODIUM 100 MG: 100 INJECTION SUBCUTANEOUS at 21:41

## 2018-03-26 RX ADMIN — PAROXETINE 30 MG: 30 TABLET, FILM COATED ORAL at 21:41

## 2018-03-26 RX ADMIN — ONDANSETRON 4 MG: 2 INJECTION INTRAMUSCULAR; INTRAVENOUS at 13:05

## 2018-03-26 RX ADMIN — FENTANYL CITRATE 50 MCG: 50 INJECTION INTRAMUSCULAR; INTRAVENOUS at 10:57

## 2018-03-26 RX ADMIN — MUPIROCIN: 20 OINTMENT TOPICAL at 21:41

## 2018-03-26 RX ADMIN — ROPIVACAINE HYDROCHLORIDE 30 ML: 5 INJECTION, SOLUTION EPIDURAL; INFILTRATION; PERINEURAL at 11:00

## 2018-03-26 RX ADMIN — EPHEDRINE SULFATE 10 MG: 50 INJECTION INTRAMUSCULAR; INTRAVENOUS; SUBCUTANEOUS at 12:57

## 2018-03-26 RX ADMIN — ROCURONIUM BROMIDE 40 MG: 10 INJECTION INTRAVENOUS at 11:17

## 2018-03-26 RX ADMIN — WARFARIN SODIUM 5 MG: 5 TABLET ORAL at 17:16

## 2018-03-26 RX ADMIN — CEFAZOLIN SODIUM 2 G: 2 INJECTION, SOLUTION INTRAVENOUS at 17:13

## 2018-03-26 RX ADMIN — PREGABALIN 150 MG: 75 CAPSULE ORAL at 10:37

## 2018-03-26 RX ADMIN — CEFAZOLIN SODIUM 2 G: 2 INJECTION, SOLUTION INTRAVENOUS at 11:25

## 2018-03-26 RX ADMIN — HYDROCODONE BITARTRATE AND ACETAMINOPHEN 1 TABLET: 7.5; 325 TABLET ORAL at 19:05

## 2018-03-26 RX ADMIN — EPHEDRINE SULFATE 10 MG: 50 INJECTION INTRAMUSCULAR; INTRAVENOUS; SUBCUTANEOUS at 12:19

## 2018-03-26 RX ADMIN — SODIUM CHLORIDE, POTASSIUM CHLORIDE, SODIUM LACTATE AND CALCIUM CHLORIDE 9 ML/HR: 600; 310; 30; 20 INJECTION, SOLUTION INTRAVENOUS at 10:19

## 2018-03-26 RX ADMIN — GLYCOPYRROLATE 0.4 MG: 0.2 INJECTION INTRAMUSCULAR; INTRAVENOUS at 13:07

## 2018-03-26 RX ADMIN — MELOXICAM 15 MG: 15 TABLET ORAL at 10:37

## 2018-03-26 RX ADMIN — FENTANYL CITRATE 75 MCG: 50 INJECTION INTRAMUSCULAR; INTRAVENOUS at 11:15

## 2018-03-26 RX ADMIN — HYDROMORPHONE HYDROCHLORIDE 0.5 MG: 2 INJECTION INTRAMUSCULAR; INTRAVENOUS; SUBCUTANEOUS at 13:12

## 2018-03-26 RX ADMIN — POLYETHYLENE GLYCOL 3350 17 G: 17 POWDER, FOR SOLUTION ORAL at 21:41

## 2018-03-26 NOTE — ANESTHESIA PREPROCEDURE EVALUATION
Anesthesia Evaluation     Patient summary reviewed and Nursing notes reviewed   no history of anesthetic complications:               Airway   Mallampati: II  TM distance: >3 FB  Neck ROM: full  no difficulty expected  Dental - normal exam     Pulmonary     breath sounds clear to auscultation  (+) pneumonia resolved ,   (-) shortness of breath, sleep apnea, decreased breath sounds, wheezes  Cardiovascular - normal exam  Exercise tolerance: good (4-7 METS)    Rhythm: regular  Rate: normal    (+) hypertension, PVD, DVT resolved, hyperlipidemia,   (-) past MI, angina, CHF, orthopnea, PND, AGUILERA      Neuro/Psych  (+) psychiatric history Anxiety and Depression,     (-) seizures, neuromuscular disease, TIA, CVA, dizziness/light headedness, weakness, numbness  GI/Hepatic/Renal/Endo    (+)  hiatal hernia,    (-) liver disease, diabetes    Musculoskeletal     (+) chronic pain, gait problem, joint swelling,   Abdominal  - normal exam   Substance History - negative use  (-) alcohol use, drug use     OB/GYN negative ob/gyn ROS         Other   (+) arthritis   history of cancer (Esopageal) remission                    Anesthesia Plan    ASA 3     general   (With AC block)  intravenous induction   Anesthetic plan and risks discussed with patient.    Plan discussed with CRNA.

## 2018-03-26 NOTE — ANESTHESIA POSTPROCEDURE EVALUATION
"Patient: Jose Hurtado    Procedure Summary     Date:  03/26/18 Room / Location:  [facility] OR  / [facility] MAIN OR    Anesthesia Start:  1113 Anesthesia Stop:  1330    Procedure:  TOTAL KNEE ARTHROPLASTY (Right Knee) Diagnosis:       Primary osteoarthritis of right knee      (Primary osteoarthritis of right knee [M17.11])    Surgeon:  Renny Solis MD Provider:  Rome Dwyer MD    Anesthesia Type:  general ASA Status:  3          Anesthesia Type: general  Last vitals  BP   138/76 (03/26/18 1400)   Temp   36.6 °C (97.8 °F) (03/26/18 1326)   Pulse   73 (03/26/18 1400)   Resp   16 (03/26/18 1400)     SpO2   100 % (03/26/18 1400)     Post Anesthesia Care and Evaluation    Patient location during evaluation: PACU  Patient participation: complete - patient participated  Level of consciousness: awake and alert  Pain management: adequate  Airway patency: patent  Anesthetic complications: No anesthetic complications    Cardiovascular status: acceptable  Respiratory status: acceptable  Hydration status: acceptable    Comments: /76   Pulse 73   Temp 36.6 °C (97.8 °F) (Oral)   Resp 16   Ht 195.6 cm (77\")   Wt 108 kg (237 lb)   SpO2 100%   BMI 28.10 kg/m²       "

## 2018-03-26 NOTE — ANESTHESIA PROCEDURE NOTES
Airway  Urgency: elective    Airway not difficult    General Information and Staff    Patient location during procedure: OR  Anesthesiologist: MAT BATRES  CRNA: EULALIA TAO    Indications and Patient Condition  Indications for airway management: airway protection    Preoxygenated: yes (pt pre-O2 with 100% O2)  MILS not maintained throughout  Mask difficulty assessment: 2 - vent by mask + OA or adjuvant +/- NMBA (easy BMV )    Final Airway Details  Final airway type: endotracheal airway      Successful airway: ETT  Cuffed: yes   Successful intubation technique: direct laryngoscopy  Endotracheal tube insertion site: oral  Blade: Hakeem  Blade size: #4  ETT size: 8.0 mm  Cormack-Lehane Classification: grade I - full view of glottis  Placement verified by: chest auscultation and capnometry   Cuff volume (mL): 7  Measured from: lips  ETT to lips (cm): 24  Number of attempts at approach: 1    Additional Comments  ATOETx1. No change in dentition.

## 2018-03-26 NOTE — ANESTHESIA PROCEDURE NOTES
Peripheral Block    Patient location during procedure: holding area  Start time: 3/26/2018 10:58 AM  Stop time: 3/26/2018 11:03 AM  Reason for block: at surgeon's request and post-op pain management  Performed by  Anesthesiologist: MORENA MURDOCK  Preanesthetic Checklist  Completed: patient identified, site marked, surgical consent, pre-op evaluation, timeout performed, IV checked, risks and benefits discussed and monitors and equipment checked  Prep:  Sterile barriers:gloves and cap  Prep: ChloraPrep  Patient monitoring: blood pressure monitoring, continuous pulse oximetry and EKG  Procedure  Sedation:yes    Guidance:ultrasound guided  ULTRASOUND INTERPRETATION.  Using ultrasound guidance a 22 G gauge needle was placed in close proximity to the femoral nerve, at which point, under ultrasound guidance anesthetic was injected in the area of the nerve and spread of the anesthesia was seen on ultrasound in close proximity thereto.  There were no abnormalities seen on ultrasound; a digital image was taken; and the patient tolerated the procedure with no complications. Images:still images obtained    Laterality:right  Block Type:adductor canal block  Injection Technique:single-shot  Needle Type:echogenic  Needle Gauge:21 G    Medications  Local Injected:ropivacaine 0.5% without epinephrine Local Amount Injected:30mL  Post Assessment  Injection Assessment: incremental injection, no paresthesia on injection and negative aspiration for heme  Patient Tolerance:comfortable throughout block  Complications:no

## 2018-03-27 ENCOUNTER — APPOINTMENT (OUTPATIENT)
Dept: CARDIOLOGY | Facility: HOSPITAL | Age: 68
End: 2018-03-27
Attending: ORTHOPAEDIC SURGERY

## 2018-03-27 LAB
BH CV LOW VAS RIGHT GREATER SAPH BK VESSEL: 1
BH CV LOWER VASCULAR LEFT COMMON FEMORAL AUGMENT: NORMAL
BH CV LOWER VASCULAR LEFT COMMON FEMORAL COMPETENT: NORMAL
BH CV LOWER VASCULAR LEFT COMMON FEMORAL COMPRESS: NORMAL
BH CV LOWER VASCULAR LEFT COMMON FEMORAL PHASIC: NORMAL
BH CV LOWER VASCULAR LEFT COMMON FEMORAL SPONT: NORMAL
BH CV LOWER VASCULAR LEFT DISTAL FEMORAL COMPRESS: NORMAL
BH CV LOWER VASCULAR LEFT GASTRONEMIUS COMPRESS: NORMAL
BH CV LOWER VASCULAR LEFT GREATER SAPH AK COMPRESS: NORMAL
BH CV LOWER VASCULAR LEFT GREATER SAPH BK COMPRESS: NORMAL
BH CV LOWER VASCULAR LEFT MID FEMORAL AUGMENT: NORMAL
BH CV LOWER VASCULAR LEFT MID FEMORAL COMPETENT: NORMAL
BH CV LOWER VASCULAR LEFT MID FEMORAL COMPRESS: NORMAL
BH CV LOWER VASCULAR LEFT MID FEMORAL PHASIC: NORMAL
BH CV LOWER VASCULAR LEFT MID FEMORAL SPONT: NORMAL
BH CV LOWER VASCULAR LEFT PERONEAL COMPRESS: NORMAL
BH CV LOWER VASCULAR LEFT POPLITEAL AUGMENT: NORMAL
BH CV LOWER VASCULAR LEFT POPLITEAL COMPETENT: NORMAL
BH CV LOWER VASCULAR LEFT POPLITEAL COMPRESS: NORMAL
BH CV LOWER VASCULAR LEFT POPLITEAL PHASIC: NORMAL
BH CV LOWER VASCULAR LEFT POPLITEAL SPONT: NORMAL
BH CV LOWER VASCULAR LEFT POSTERIOR TIBIAL COMPRESS: NORMAL
BH CV LOWER VASCULAR LEFT PROXIMAL FEMORAL COMPRESS: NORMAL
BH CV LOWER VASCULAR LEFT SAPHENOFEMORAL JUNCTION AUGMENT: NORMAL
BH CV LOWER VASCULAR LEFT SAPHENOFEMORAL JUNCTION COMPETENT: NORMAL
BH CV LOWER VASCULAR LEFT SAPHENOFEMORAL JUNCTION COMPRESS: NORMAL
BH CV LOWER VASCULAR LEFT SAPHENOFEMORAL JUNCTION PHASIC: NORMAL
BH CV LOWER VASCULAR LEFT SAPHENOFEMORAL JUNCTION SPONT: NORMAL
BH CV LOWER VASCULAR RIGHT COMMON FEMORAL AUGMENT: NORMAL
BH CV LOWER VASCULAR RIGHT COMMON FEMORAL COMPETENT: NORMAL
BH CV LOWER VASCULAR RIGHT COMMON FEMORAL COMPRESS: NORMAL
BH CV LOWER VASCULAR RIGHT COMMON FEMORAL PHASIC: NORMAL
BH CV LOWER VASCULAR RIGHT COMMON FEMORAL SPONT: NORMAL
BH CV LOWER VASCULAR RIGHT DISTAL FEMORAL COMPRESS: NORMAL
BH CV LOWER VASCULAR RIGHT GASTRONEMIUS COMPRESS: NORMAL
BH CV LOWER VASCULAR RIGHT GREATER SAPH AK COMPRESS: NORMAL
BH CV LOWER VASCULAR RIGHT GREATER SAPH BK COMPRESS: NORMAL
BH CV LOWER VASCULAR RIGHT GREATER SAPH BK THROMBUS: NORMAL
BH CV LOWER VASCULAR RIGHT MID FEMORAL AUGMENT: NORMAL
BH CV LOWER VASCULAR RIGHT MID FEMORAL COMPETENT: NORMAL
BH CV LOWER VASCULAR RIGHT MID FEMORAL COMPRESS: NORMAL
BH CV LOWER VASCULAR RIGHT MID FEMORAL PHASIC: NORMAL
BH CV LOWER VASCULAR RIGHT MID FEMORAL SPONT: NORMAL
BH CV LOWER VASCULAR RIGHT PERONEAL COMPRESS: NORMAL
BH CV LOWER VASCULAR RIGHT POPLITEAL AUGMENT: NORMAL
BH CV LOWER VASCULAR RIGHT POPLITEAL COMPETENT: NORMAL
BH CV LOWER VASCULAR RIGHT POPLITEAL COMPRESS: NORMAL
BH CV LOWER VASCULAR RIGHT POPLITEAL PHASIC: NORMAL
BH CV LOWER VASCULAR RIGHT POPLITEAL SPONT: NORMAL
BH CV LOWER VASCULAR RIGHT POSTERIOR TIBIAL COMPRESS: NORMAL
BH CV LOWER VASCULAR RIGHT PROXIMAL FEMORAL COMPRESS: NORMAL
BH CV LOWER VASCULAR RIGHT SAPHENOFEMORAL JUNCTION AUGMENT: NORMAL
BH CV LOWER VASCULAR RIGHT SAPHENOFEMORAL JUNCTION COMPETENT: NORMAL
BH CV LOWER VASCULAR RIGHT SAPHENOFEMORAL JUNCTION COMPRESS: NORMAL
BH CV LOWER VASCULAR RIGHT SAPHENOFEMORAL JUNCTION PHASIC: NORMAL
BH CV LOWER VASCULAR RIGHT SAPHENOFEMORAL JUNCTION SPONT: NORMAL
HCT VFR BLD AUTO: 30.2 % (ref 40.4–52.2)
HGB BLD-MCNC: 9.1 G/DL (ref 13.7–17.6)
INR PPP: 1.32 (ref 0.9–1.1)
PROTHROMBIN TIME: 16.2 SECONDS (ref 11.7–14.2)

## 2018-03-27 PROCEDURE — 97110 THERAPEUTIC EXERCISES: CPT

## 2018-03-27 PROCEDURE — 25010000002 ENOXAPARIN PER 10 MG: Performed by: NURSE PRACTITIONER

## 2018-03-27 PROCEDURE — 85610 PROTHROMBIN TIME: CPT | Performed by: NURSE PRACTITIONER

## 2018-03-27 PROCEDURE — 27447 TOTAL KNEE ARTHROPLASTY: CPT | Performed by: ORTHOPAEDIC SURGERY

## 2018-03-27 PROCEDURE — 93970 EXTREMITY STUDY: CPT

## 2018-03-27 PROCEDURE — 25010000003 CEFAZOLIN IN DEXTROSE 2-4 GM/100ML-% SOLUTION: Performed by: NURSE PRACTITIONER

## 2018-03-27 PROCEDURE — 85018 HEMOGLOBIN: CPT | Performed by: NURSE PRACTITIONER

## 2018-03-27 PROCEDURE — 97150 GROUP THERAPEUTIC PROCEDURES: CPT

## 2018-03-27 PROCEDURE — 85014 HEMATOCRIT: CPT | Performed by: NURSE PRACTITIONER

## 2018-03-27 RX ADMIN — PRAMIPEXOLE DIHYDROCHLORIDE 1.5 MG: 1.5 TABLET ORAL at 08:40

## 2018-03-27 RX ADMIN — PRAMIPEXOLE DIHYDROCHLORIDE 1.5 MG: 1.5 TABLET ORAL at 21:26

## 2018-03-27 RX ADMIN — HYDROCODONE BITARTRATE AND ACETAMINOPHEN 2 TABLET: 7.5; 325 TABLET ORAL at 16:37

## 2018-03-27 RX ADMIN — POLYETHYLENE GLYCOL 3350 17 G: 17 POWDER, FOR SOLUTION ORAL at 08:39

## 2018-03-27 RX ADMIN — MUPIROCIN: 20 OINTMENT TOPICAL at 08:41

## 2018-03-27 RX ADMIN — HYDROCODONE BITARTRATE AND ACETAMINOPHEN 1 TABLET: 7.5; 325 TABLET ORAL at 21:26

## 2018-03-27 RX ADMIN — DOCUSATE SODIUM 100 MG: 100 CAPSULE, LIQUID FILLED ORAL at 08:41

## 2018-03-27 RX ADMIN — WARFARIN SODIUM 5 MG: 5 TABLET ORAL at 18:25

## 2018-03-27 RX ADMIN — PAROXETINE 30 MG: 30 TABLET, FILM COATED ORAL at 21:26

## 2018-03-27 RX ADMIN — HYDROCODONE BITARTRATE AND ACETAMINOPHEN 2 TABLET: 7.5; 325 TABLET ORAL at 12:09

## 2018-03-27 RX ADMIN — ENOXAPARIN SODIUM 100 MG: 100 INJECTION SUBCUTANEOUS at 08:41

## 2018-03-27 RX ADMIN — MELOXICAM 7.5 MG: 7.5 TABLET ORAL at 08:41

## 2018-03-27 RX ADMIN — ENOXAPARIN SODIUM 100 MG: 100 INJECTION SUBCUTANEOUS at 21:26

## 2018-03-27 RX ADMIN — CEFAZOLIN SODIUM 2 G: 2 INJECTION, SOLUTION INTRAVENOUS at 01:00

## 2018-03-28 LAB
HCT VFR BLD AUTO: 27.6 % (ref 40.4–52.2)
HGB BLD-MCNC: 8.4 G/DL (ref 13.7–17.6)

## 2018-03-28 PROCEDURE — 85018 HEMOGLOBIN: CPT | Performed by: NURSE PRACTITIONER

## 2018-03-28 PROCEDURE — 25010000002 ENOXAPARIN PER 10 MG: Performed by: NURSE PRACTITIONER

## 2018-03-28 PROCEDURE — 97110 THERAPEUTIC EXERCISES: CPT

## 2018-03-28 PROCEDURE — 99024 POSTOP FOLLOW-UP VISIT: CPT | Performed by: ORTHOPAEDIC SURGERY

## 2018-03-28 PROCEDURE — 97150 GROUP THERAPEUTIC PROCEDURES: CPT

## 2018-03-28 PROCEDURE — 85014 HEMATOCRIT: CPT | Performed by: NURSE PRACTITIONER

## 2018-03-28 RX ADMIN — WARFARIN SODIUM 5 MG: 5 TABLET ORAL at 17:57

## 2018-03-28 RX ADMIN — DOCUSATE SODIUM 100 MG: 100 CAPSULE, LIQUID FILLED ORAL at 09:05

## 2018-03-28 RX ADMIN — ENOXAPARIN SODIUM 100 MG: 100 INJECTION SUBCUTANEOUS at 22:24

## 2018-03-28 RX ADMIN — PAROXETINE 30 MG: 30 TABLET, FILM COATED ORAL at 22:24

## 2018-03-28 RX ADMIN — POLYETHYLENE GLYCOL 3350 17 G: 17 POWDER, FOR SOLUTION ORAL at 09:05

## 2018-03-28 RX ADMIN — DOCUSATE SODIUM 100 MG: 100 CAPSULE, LIQUID FILLED ORAL at 22:24

## 2018-03-28 RX ADMIN — HYDROCODONE BITARTRATE AND ACETAMINOPHEN 2 TABLET: 7.5; 325 TABLET ORAL at 14:01

## 2018-03-28 RX ADMIN — ENOXAPARIN SODIUM 100 MG: 100 INJECTION SUBCUTANEOUS at 09:05

## 2018-03-28 RX ADMIN — PRAMIPEXOLE DIHYDROCHLORIDE 1.5 MG: 1.5 TABLET ORAL at 09:05

## 2018-03-28 RX ADMIN — PRAMIPEXOLE DIHYDROCHLORIDE 1.5 MG: 1.5 TABLET ORAL at 22:23

## 2018-03-28 RX ADMIN — HYDROCODONE BITARTRATE AND ACETAMINOPHEN 2 TABLET: 7.5; 325 TABLET ORAL at 22:24

## 2018-03-28 RX ADMIN — POLYETHYLENE GLYCOL 3350 17 G: 17 POWDER, FOR SOLUTION ORAL at 22:24

## 2018-03-28 RX ADMIN — PANTOPRAZOLE SODIUM 40 MG: 40 TABLET, DELAYED RELEASE ORAL at 05:56

## 2018-03-28 RX ADMIN — MELOXICAM 7.5 MG: 7.5 TABLET ORAL at 09:05

## 2018-03-29 LAB
ABO GROUP BLD: NORMAL
BLD GP AB SCN SERPL QL: NEGATIVE
HCT VFR BLD AUTO: 24.6 % (ref 40.4–52.2)
HGB BLD-MCNC: 7.6 G/DL (ref 13.7–17.6)
INR PPP: 1.54 (ref 0.9–1.1)
PROTHROMBIN TIME: 18.2 SECONDS (ref 11.7–14.2)
RH BLD: POSITIVE
T&S EXPIRATION DATE: NORMAL

## 2018-03-29 PROCEDURE — 36430 TRANSFUSION BLD/BLD COMPNT: CPT

## 2018-03-29 PROCEDURE — 99024 POSTOP FOLLOW-UP VISIT: CPT | Performed by: NURSE PRACTITIONER

## 2018-03-29 PROCEDURE — P9016 RBC LEUKOCYTES REDUCED: HCPCS

## 2018-03-29 PROCEDURE — 85610 PROTHROMBIN TIME: CPT | Performed by: ORTHOPAEDIC SURGERY

## 2018-03-29 PROCEDURE — 86900 BLOOD TYPING SEROLOGIC ABO: CPT | Performed by: ORTHOPAEDIC SURGERY

## 2018-03-29 PROCEDURE — 97110 THERAPEUTIC EXERCISES: CPT

## 2018-03-29 PROCEDURE — 86901 BLOOD TYPING SEROLOGIC RH(D): CPT | Performed by: ORTHOPAEDIC SURGERY

## 2018-03-29 PROCEDURE — 85014 HEMATOCRIT: CPT | Performed by: NURSE PRACTITIONER

## 2018-03-29 PROCEDURE — 86900 BLOOD TYPING SEROLOGIC ABO: CPT

## 2018-03-29 PROCEDURE — 86923 COMPATIBILITY TEST ELECTRIC: CPT

## 2018-03-29 PROCEDURE — 97150 GROUP THERAPEUTIC PROCEDURES: CPT

## 2018-03-29 PROCEDURE — 86850 RBC ANTIBODY SCREEN: CPT | Performed by: ORTHOPAEDIC SURGERY

## 2018-03-29 PROCEDURE — 25010000002 ENOXAPARIN PER 10 MG: Performed by: NURSE PRACTITIONER

## 2018-03-29 PROCEDURE — 86901 BLOOD TYPING SEROLOGIC RH(D): CPT

## 2018-03-29 PROCEDURE — 85018 HEMOGLOBIN: CPT | Performed by: NURSE PRACTITIONER

## 2018-03-29 RX ADMIN — ENOXAPARIN SODIUM 100 MG: 100 INJECTION SUBCUTANEOUS at 21:45

## 2018-03-29 RX ADMIN — WARFARIN SODIUM 5 MG: 5 TABLET ORAL at 18:46

## 2018-03-29 RX ADMIN — PAROXETINE 30 MG: 30 TABLET, FILM COATED ORAL at 21:45

## 2018-03-29 RX ADMIN — MELOXICAM 7.5 MG: 7.5 TABLET ORAL at 10:26

## 2018-03-29 RX ADMIN — DOCUSATE SODIUM 100 MG: 100 CAPSULE, LIQUID FILLED ORAL at 21:45

## 2018-03-29 RX ADMIN — POLYETHYLENE GLYCOL 3350 17 G: 17 POWDER, FOR SOLUTION ORAL at 21:45

## 2018-03-29 RX ADMIN — HYDROCODONE BITARTRATE AND ACETAMINOPHEN 2 TABLET: 7.5; 325 TABLET ORAL at 06:12

## 2018-03-29 RX ADMIN — PRAMIPEXOLE DIHYDROCHLORIDE 1.5 MG: 1.5 TABLET ORAL at 10:26

## 2018-03-29 RX ADMIN — DOCUSATE SODIUM 100 MG: 100 CAPSULE, LIQUID FILLED ORAL at 10:27

## 2018-03-29 RX ADMIN — PRAMIPEXOLE DIHYDROCHLORIDE 1.5 MG: 1.5 TABLET ORAL at 21:45

## 2018-03-29 RX ADMIN — POLYETHYLENE GLYCOL 3350 17 G: 17 POWDER, FOR SOLUTION ORAL at 10:26

## 2018-03-29 RX ADMIN — HYDROCODONE BITARTRATE AND ACETAMINOPHEN 1 TABLET: 7.5; 325 TABLET ORAL at 18:49

## 2018-03-29 RX ADMIN — HYDROCODONE BITARTRATE AND ACETAMINOPHEN 2 TABLET: 7.5; 325 TABLET ORAL at 10:27

## 2018-03-29 RX ADMIN — ENOXAPARIN SODIUM 100 MG: 100 INJECTION SUBCUTANEOUS at 10:27

## 2018-03-29 RX ADMIN — PANTOPRAZOLE SODIUM 40 MG: 40 TABLET, DELAYED RELEASE ORAL at 06:12

## 2018-03-30 VITALS
HEART RATE: 74 BPM | BODY MASS INDEX: 27.98 KG/M2 | RESPIRATION RATE: 16 BRPM | TEMPERATURE: 97 F | HEIGHT: 77 IN | WEIGHT: 237 LBS | SYSTOLIC BLOOD PRESSURE: 125 MMHG | OXYGEN SATURATION: 97 % | DIASTOLIC BLOOD PRESSURE: 64 MMHG

## 2018-03-30 LAB
ABO + RH BLD: NORMAL
ABO + RH BLD: NORMAL
BH BB BLOOD EXPIRATION DATE: NORMAL
BH BB BLOOD EXPIRATION DATE: NORMAL
BH BB BLOOD TYPE BARCODE: 5100
BH BB BLOOD TYPE BARCODE: 5100
BH BB DISPENSE STATUS: NORMAL
BH BB DISPENSE STATUS: NORMAL
BH BB PRODUCT CODE: NORMAL
BH BB PRODUCT CODE: NORMAL
BH BB UNIT NUMBER: NORMAL
BH BB UNIT NUMBER: NORMAL
HCT VFR BLD AUTO: 29.2 % (ref 40.4–52.2)
HGB BLD-MCNC: 9 G/DL (ref 13.7–17.6)
UNIT  ABO: NORMAL
UNIT  ABO: NORMAL
UNIT  RH: NORMAL
UNIT  RH: NORMAL

## 2018-03-30 PROCEDURE — 85014 HEMATOCRIT: CPT | Performed by: NURSE PRACTITIONER

## 2018-03-30 PROCEDURE — 97110 THERAPEUTIC EXERCISES: CPT

## 2018-03-30 PROCEDURE — 85018 HEMOGLOBIN: CPT | Performed by: NURSE PRACTITIONER

## 2018-03-30 PROCEDURE — 25010000002 ENOXAPARIN PER 10 MG: Performed by: NURSE PRACTITIONER

## 2018-03-30 PROCEDURE — 99024 POSTOP FOLLOW-UP VISIT: CPT | Performed by: NURSE PRACTITIONER

## 2018-03-30 PROCEDURE — 97150 GROUP THERAPEUTIC PROCEDURES: CPT

## 2018-03-30 RX ORDER — MELOXICAM 7.5 MG/1
7.5 TABLET ORAL DAILY
Qty: 6 TABLET | Refills: 0 | Status: SHIPPED | OUTPATIENT
Start: 2018-03-31 | End: 2018-04-06

## 2018-03-30 RX ORDER — ONDANSETRON 4 MG/1
4 TABLET, FILM COATED ORAL EVERY 6 HOURS PRN
Qty: 10 TABLET | Refills: 0 | Status: SHIPPED | OUTPATIENT
Start: 2018-03-30 | End: 2018-05-10

## 2018-03-30 RX ORDER — PSEUDOEPHEDRINE HCL 30 MG
100 TABLET ORAL 2 TIMES DAILY
Qty: 21 CAPSULE | Refills: 0 | Status: SHIPPED | OUTPATIENT
Start: 2018-03-30 | End: 2018-04-10

## 2018-03-30 RX ORDER — PANTOPRAZOLE SODIUM 40 MG/1
40 TABLET, DELAYED RELEASE ORAL EVERY MORNING
Qty: 14 TABLET | Refills: 0 | Status: SHIPPED | OUTPATIENT
Start: 2018-03-31 | End: 2018-04-12

## 2018-03-30 RX ORDER — HYDROCODONE BITARTRATE AND ACETAMINOPHEN 7.5; 325 MG/1; MG/1
2 TABLET ORAL EVERY 4 HOURS PRN
Qty: 60 TABLET | Refills: 0 | Status: SHIPPED | OUTPATIENT
Start: 2018-03-30 | End: 2018-04-05

## 2018-03-30 RX ADMIN — PRAMIPEXOLE DIHYDROCHLORIDE 1.5 MG: 1.5 TABLET ORAL at 09:43

## 2018-03-30 RX ADMIN — POLYETHYLENE GLYCOL 3350 17 G: 17 POWDER, FOR SOLUTION ORAL at 09:43

## 2018-03-30 RX ADMIN — HYDROCODONE BITARTRATE AND ACETAMINOPHEN 1 TABLET: 7.5; 325 TABLET ORAL at 09:43

## 2018-03-30 RX ADMIN — ENOXAPARIN SODIUM 100 MG: 100 INJECTION SUBCUTANEOUS at 09:43

## 2018-03-30 RX ADMIN — MELOXICAM 7.5 MG: 7.5 TABLET ORAL at 09:43

## 2018-03-30 RX ADMIN — PANTOPRAZOLE SODIUM 40 MG: 40 TABLET, DELAYED RELEASE ORAL at 05:57

## 2018-03-30 RX ADMIN — DOCUSATE SODIUM 100 MG: 100 CAPSULE, LIQUID FILLED ORAL at 09:43

## 2018-04-02 ENCOUNTER — TELEPHONE (OUTPATIENT)
Dept: ORTHOPEDIC SURGERY | Facility: CLINIC | Age: 68
End: 2018-04-02

## 2018-04-02 ENCOUNTER — TELEPHONE (OUTPATIENT)
Dept: INTERNAL MEDICINE | Age: 68
End: 2018-04-02

## 2018-04-02 NOTE — TELEPHONE ENCOUNTER
ProTime today is 31.3 INR is 2.6 will continue his Coumadin at 5 mg a day and recheck his ProTime again on Thursday.  Took his last dose of Lovenox yesterday.  He was on Coumadin prior to surgery and will be doing his own pro times

## 2018-04-02 NOTE — TELEPHONE ENCOUNTER
If this is a solitary skipped beat, I don't think it's anything serious.  However if this is a recurring pattern, it could represent a more significant rhythm disturbance.  The only way to find out is if he is seen, listened to, and consider for Holter monitor.  If he wants to be seen today or tomorrow bring him in.

## 2018-04-02 NOTE — TELEPHONE ENCOUNTER
Pt states [facility] nurse felt a skip in pt's heart beat on Monday 4/2/18 at approx 10:15a.    Pt is curious if he needs to be seen by .    Pls advise.    Pls call pt #[phone].

## 2018-04-05 ENCOUNTER — TELEPHONE (OUTPATIENT)
Dept: ORTHOPEDIC SURGERY | Facility: CLINIC | Age: 68
End: 2018-04-05

## 2018-04-05 NOTE — TELEPHONE ENCOUNTER
ProTime today is 41.9 INR is 3.5.  Have left instructions for him to hold his Coumadin today and tomorrow and then will begin alternating Coumadin 2.5 with 5 mg every other day.  Check his ProTime again on Monday per RBB

## 2018-04-09 ENCOUNTER — TELEPHONE (OUTPATIENT)
Dept: ORTHOPEDIC SURGERY | Facility: CLINIC | Age: 68
End: 2018-04-09

## 2018-04-09 NOTE — TELEPHONE ENCOUNTER
ProTime today was 19 INR was 1.6.  Patient was on long-term Coumadin therapy prior to surgery and managed his own pro times at home.  Discharged from home health and I have left a message for him to increase his Coumadin to take 2.5 mg 1 day and 5 mg for 2 days and then repeat this sequence.  I've also left a message for him to contact me the office regarding further pro times be

## 2018-04-10 ENCOUNTER — OFFICE VISIT (OUTPATIENT)
Dept: ORTHOPEDIC SURGERY | Facility: CLINIC | Age: 68
End: 2018-04-10

## 2018-04-10 VITALS — HEIGHT: 77 IN | WEIGHT: 240 LBS | TEMPERATURE: 98.6 F | BODY MASS INDEX: 28.34 KG/M2

## 2018-04-10 DIAGNOSIS — Z96.651 HISTORY OF TOTAL RIGHT KNEE REPLACEMENT: Primary | ICD-10-CM

## 2018-04-10 PROCEDURE — 99024 POSTOP FOLLOW-UP VISIT: CPT | Performed by: ORTHOPAEDIC SURGERY

## 2018-04-10 NOTE — PROGRESS NOTES
Jose Hurtado : 1950 MRN: [id number] DATE: 4/10/2018    DIAGNOSIS: 2 week follow up right total knee      SUBJECTIVE:Patient returns today for 2 week follow up of right total knee replacement. Patient reports doing well with no unusual complaints. Appears to be progressing appropriately.    OBJECTIVE:   Exam:. The incision is healing appropriately. No sign of infection. Range of motion is progressing as expected. The calf is soft and nontender with a negative Homans sign.    ASSESSMENT: 2 week status post right knee replacement.    PLAN: 1) Staples removed and steri strips applied   2) Order given for PT   3) Discontinue JEANNIE hose   4) Continue ice PRN   5) warfarin for lifetime - I will now defer to he and Dr. Mooney as far as dosage.  He checks his INR on his own at home and is very familiar with   6) Follow up in 6 weeks with repeat Xrays of right knee (3views)    Renny Solis MD  4/10/2018

## 2018-04-11 ENCOUNTER — TREATMENT (OUTPATIENT)
Dept: PHYSICAL THERAPY | Facility: CLINIC | Age: 68
End: 2018-04-11

## 2018-04-11 DIAGNOSIS — Z96.651 TOTAL KNEE REPLACEMENT STATUS, RIGHT: Primary | ICD-10-CM

## 2018-04-11 PROCEDURE — 97140 MANUAL THERAPY 1/> REGIONS: CPT | Performed by: PHYSICAL THERAPIST

## 2018-04-11 PROCEDURE — 97161 PT EVAL LOW COMPLEX 20 MIN: CPT | Performed by: PHYSICAL THERAPIST

## 2018-04-11 PROCEDURE — 97110 THERAPEUTIC EXERCISES: CPT | Performed by: PHYSICAL THERAPIST

## 2018-04-11 NOTE — PROGRESS NOTES
"Physical Therapy Initial Evaluation and Plan of Care      Patient: Jose Hurtado   : 1950  Diagnosis/ICD-10 Code:  Total knee replacement status, right [Z96.651]  Referring practitioner: Renny Solis MD    Subjective Evaluation    History of Present Illness  Date of surgery: 3/26/2018  Mechanism of injury: Chronic knee pain for years. \"Wear and tear\"  PMH significant for L knee surgery in the \"s: had patella removed. No problem with L knee  HAd 5 HH visits.   Weaning off pain meds. USing ice  History of DVT's LLE and lymphedema      Patient Occupation: semi retired  Pain  Current pain ratin  At worst pain ratin  Quality: dull ache, tight and discomfort  Relieving factors: ice, medications and rest  Aggravating factors: movement, stairs, standing, squatting and ambulation  Progression: improved    Social Support  Lives in: multiple-level home  Lives with: spouse    Treatments  Previous treatment: physical therapy  Discharged from (in last 30 days): inpatient hospitalization  Patient Goals  Patient goals for therapy: decreased edema, decreased pain, increased motion, improved balance, increased strength and independence with ADLs/IADLs  Patient goal: just walk, stairs, normal daily activities, light yardwork           Objective       Observations     Right Knee   Positive for edema and incision.     Additional Observation Details  steristrips in place    Active Range of Motion     Right Knee   Flexion: 86 degrees   Extension: Right knee active extension: -10.     Passive Range of Motion     Right Knee   Flexion: 98 (After treatment) degrees   Extension: Right knee passive extension: -5.     Swelling     Left Knee Girth Measurement (cm)   Joint line: 46 cm    Right Knee Girth Measurement (cm)   Joint line: 50 cm         Assessment & Plan     Assessment  Impairments: abnormal gait, abnormal or restricted ROM, activity intolerance, impaired balance, impaired physical strength, lacks " appropriate home exercise program and pain with function  Assessment details:     Pt is a good candidate for skilled PT intervention to restore functional AROM and strength to return to previous level of ADL's.  Prognosis: good  Prognosis details:     Short Term Goals (2 weeks)  1. Pt to exhibit 5-100 degrees of knee AROM to allow for improved gait and stairclimbing  2. Pt to report less pain with ADL's  3. Pt able to ambulate household and clinic distances with straight cane and good heel strike    Long Term Goals: (4 weeks)  1. Pt to exhibit 0-120 degrees  Knee AROM to allow for traversing curbs and ascending./ descending stairs  2. Pt to report min to no pain with stairclimbing  3. Pt to score >50/80 on LEFS  4. Pt to exhibit 5/5 strength to allow for more strenuous ADL's and recreational activities  Functional Limitations: walking, uncomfortable because of pain, moving in bed, standing and unable to perform repetitive tasks  Plan  Therapy options: will be seen for skilled physical therapy services  Planned modality interventions: cryotherapy and electrical stimulation/Russian stimulation  Planned therapy interventions: manual therapy, soft tissue mobilization, strengthening, stretching, therapeutic activities, transfer training, joint mobilization, home exercise program, gait training, functional ROM exercises and flexibility  Frequency: 3x week  Duration in weeks: 4  Treatment plan discussed with: patient        Manual Therapy:    12     mins  98933;  Therapeutic Exercise:    15     mins  67768;     Neuromuscular Wil:        mins  47927;    Therapeutic Activity:          mins  23593;     Gait Training:           mins  56380;     Ultrasound:          mins  27648;    Electrical Stimulation:         mins  77343 ( );  Dry Needling          mins self-pay    Timed Treatment:   27   mins   Total Treatment:     57   mins    PT SIGNATURE: Caroline Vasquez PT   [place] License # [id number]  DATE TREATMENT INITIATED:  4/12/2018    Initial Certification  Certification Period: 7/11/2018  I certify that the therapy services are furnished while this patient is under my care.  The services outlined above are required by this patient, and will be reviewed every 90 days.     PHYSICIAN: Renny Solis MD      DATE:     Please sign and return via fax to [phone].. Thank you, [facility].

## 2018-04-12 ENCOUNTER — OFFICE VISIT (OUTPATIENT)
Dept: INTERNAL MEDICINE | Age: 68
End: 2018-04-12

## 2018-04-12 VITALS
DIASTOLIC BLOOD PRESSURE: 50 MMHG | HEIGHT: 77 IN | HEART RATE: 86 BPM | TEMPERATURE: 98.5 F | OXYGEN SATURATION: 96 % | SYSTOLIC BLOOD PRESSURE: 122 MMHG | BODY MASS INDEX: 28.43 KG/M2 | WEIGHT: 240.8 LBS

## 2018-04-12 DIAGNOSIS — R32 URINARY INCONTINENCE, UNSPECIFIED TYPE: ICD-10-CM

## 2018-04-12 DIAGNOSIS — G25.81 RLS (RESTLESS LEGS SYNDROME): ICD-10-CM

## 2018-04-12 DIAGNOSIS — I10 ESSENTIAL HYPERTENSION: ICD-10-CM

## 2018-04-12 DIAGNOSIS — Z23 ENCOUNTER FOR IMMUNIZATION: ICD-10-CM

## 2018-04-12 DIAGNOSIS — Z87.59 HISTORY OF MATERNAL DEEP VEIN THROMBOSIS (DVT): Primary | ICD-10-CM

## 2018-04-12 DIAGNOSIS — Z86.718 HISTORY OF MATERNAL DEEP VEIN THROMBOSIS (DVT): Primary | ICD-10-CM

## 2018-04-12 LAB — INR PPP: 2.1 (ref 2–3)

## 2018-04-12 PROCEDURE — 85610 PROTHROMBIN TIME: CPT | Performed by: INTERNAL MEDICINE

## 2018-04-12 PROCEDURE — 90471 IMMUNIZATION ADMIN: CPT | Performed by: INTERNAL MEDICINE

## 2018-04-12 PROCEDURE — 36416 COLLJ CAPILLARY BLOOD SPEC: CPT | Performed by: INTERNAL MEDICINE

## 2018-04-12 PROCEDURE — 99214 OFFICE O/P EST MOD 30 MIN: CPT | Performed by: INTERNAL MEDICINE

## 2018-04-12 PROCEDURE — 90714 TD VACC NO PRESV 7 YRS+ IM: CPT | Performed by: INTERNAL MEDICINE

## 2018-04-12 NOTE — PROGRESS NOTES
"  Jose FITCH Hurtado / 68 y.o. / male  04/12/2018      ASSESSMENT & PLAN:    Problem List Items Addressed This Visit        Unprioritized    RLS (restless legs syndrome)    Hypertension      Other Visit Diagnoses     History of maternal deep vein thrombosis (DVT)    -  Primary    Relevant Orders    POC INR (Completed)    Encounter for immunization        Urinary incontinence, unspecified type            Orders Placed This Encounter   Procedures   • POC INR       Summary/Discussion:    Number-one history of DVT, on therapeutic dose of anticoagulation at this time with no adverse bleeding noted.  Plan: Continue same, patient continue to monitor INR home and for results on to me.  Patient will present fasting at her next visit for  lipid.    #2 hypertension stable on a fairly mild dose of diuretic.  Patient is having side effect of medication.  I suggested we discontinue medication at this time, he will monitor blood pressure at home and notify me if the pressures consistently above 150/100.  We'll recheck him in 4 weeks and add medication if indicated at that time.    #3 restless leg syndrome  on Mirapex, with questionable benefit.  Patient will wean this over the next 2 weeks, and observe, we will discuss this at her next visit in May.    #4 immunization update provide tetanus toxoid today.    Return in about 4 weeks (around 5/10/2018) for Recheck, Blood pressure check.    ____________________________________________________________________    VITALS    Visit Vitals  /50   Pulse 86   Temp 98.5 °F (36.9 °C)   Ht 195.6 cm (77.01\")   Wt 109 kg (240 lb 12.8 oz)   SpO2 96%   BMI 28.55 kg/m²       BP Readings from Last 3 Encounters:   04/12/18 122/50   03/30/18 125/64   03/22/18 140/72     Wt Readings from Last 3 Encounters:   04/12/18 109 kg (240 lb 12.8 oz)   04/10/18 109 kg (240 lb)   03/26/18 108 kg (237 lb)      Body mass index is 28.55 kg/m².    CC:  Main reason(s) for today's visit: Follow-up (knee replacement) " and Med Refill (would like changes)      HPI:     Patient presents this morning for follow-up of his recent right knee replacement on March 26.  He did well to surgery without any postop complications.  He is now back on his Coumadin therapy regularly, his INR as done at Home this  morning was normal at 2.1.  There are no bleeding complications noted.    Patient has several issues with medications.  He has problems with urinary continence, and is presently being maintained on Maxide.  Yes that this can be changed to something that will not worsen his urinary continence issues.  He does have follow-up with urology within the next 2 weeks.    RLS versus leg cramps: He is not sure of the Mirapex is actually helping or not.  He would like to come off of this.  We did discuss weaning over the next week to 10 days and then comparing symptoms on and off medication.  That will tell us whether or not the medication is helpful or not.        Patient Care Team:  George Mooney MD as PCP - General  George Phelan II, MD as Consulting Physician (Hematology and Oncology)  Jose Inman MD as Consulting Physician (Urology)  Mulugeta BLACKWELL MD as Consulting Physician (Gastroenterology)  Seth Solomon MD as Consulting Physician (Cardiology)  Jose Christine III, MD as Referring Physician (Thoracic Surgery)  ____________________________________________________________________    REVIEW OF SYSTEMS    Review of Systems   Respiratory: Negative for chest tightness and shortness of breath.    Cardiovascular: Negative for chest pain and palpitations.   Neurological: Negative for dizziness, syncope, speech difficulty, weakness, light-headedness and headaches.         PHYSICAL EXAMINATION    Physical Exam   Constitutional: He is oriented to person, place, and time. He appears well-developed and well-nourished. No distress.   Cardiovascular: Normal rate, regular rhythm, normal heart sounds and intact distal pulses.  Exam reveals  no gallop and no friction rub.    No murmur heard.  Pulmonary/Chest: Effort normal and breath sounds normal. He has no wheezes. He has no rales.   Musculoskeletal: He exhibits edema.   Edema lower extremities left greater than right.  Left side is the site of prior clot.  Patient had ultrasound done at the end of March which showed no evidence of DVT.  He does have support hose to wear on his left side.  Because of the ongoing physical therapy necessity's on the right knee, he has not been elevating the left leg.  He is aware that that will help the edema as well.   Neurological: He is alert and oriented to person, place, and time.   Skin: Skin is warm and dry. No rash noted.   Psychiatric: He has a normal mood and affect. His behavior is normal. Judgment and thought content normal.   Nursing note and vitals reviewed.      REVIEWED DATA:    Labs:     Lab Results   Component Value Date     (L) 03/15/2018    K 4.3 03/15/2018    AST 26 03/08/2018    ALT 19 03/08/2018    BUN 23 03/15/2018    CREATININE 1.16 03/15/2018    CREATININE 1.17 03/08/2018    CREATININE 1.20 03/02/2018    EGFRIFNONA 62 03/15/2018    EGFRIFAFRI  08/30/2016      Comment:      <15 Indicative of kidney failure.       Lab Results   Component Value Date    GLUCOSE 108 (H) 03/15/2018    GLUCOSE 103 03/08/2018    GLUCOSE 86 09/15/2017       Lab Results   Component Value Date     (H) 01/30/2018    LDL 98 01/26/2017    HDL 55 01/30/2018    TRIG 74 01/30/2018       Lab Results   Component Value Date    TSH 3.29 04/28/2015       Lab Results   Component Value Date    WBC 3.44 (L) 03/15/2018    HGB 9.0 (L) 03/30/2018    HGB 7.6 (L) 03/29/2018    HGB 8.4 (L) 03/28/2018     03/15/2018       Imaging:         Medical Tests:         Summary of old records / correspondence / consultant report:         Request outside records:         ALLERGIES  No Known Allergies     MEDICATIONS  Current Outpatient Prescriptions   Medication Sig Dispense Refill    • ondansetron (ZOFRAN) 4 MG tablet Take 1 tablet by mouth Every 6 (Six) Hours As Needed for Nausea or Vomiting. 10 tablet 0   • PARoxetine (PAXIL) 30 MG tablet Take 1 tablet by mouth Every Night. 30 tablet 2   • pramipexole (MIRAPEX) 1.5 MG tablet Take 1.5 mg by mouth 2 (Two) Times a Day.     • warfarin (COUMADIN) 5 MG tablet Take 5 mg by mouth Daily. Pt to stop 6 days before surgery & bridge with lovenox       No current facility-administered medications for this visit.        PFSH:     The following portions of the patient's history were reviewed and updated as appropriate: Allergies / Current Medications / Past Medical History / Surgical History / Social History / Family History    PROBLEM LIST   Patient Active Problem List   Diagnosis   • Adenocarcinoma of esophagus   • Adenomatous polyp of colon   • Anemia   • Duodenal ulcer   • Insomnia   • Malignant neoplasm of prostate   • RLS (restless legs syndrome)   • Thrombophlebitis of deep veins of lower extremity   • Ventral hernia without obstruction or gangrene   • Incisional hernia, without obstruction or gangrene   • Hernia, incisional   • Osteoarthrosis, localized, primary, knee   • BPH (benign prostatic hyperplasia)   • Stasis dermatitis   • Medicare annual wellness visit, subsequent   • Reactive depression   • DVT (deep venous thrombosis)   • Hammer toes of both feet   • Other hammer toe(s) (acquired), left foot   • Status post left foot surgery   • Surgery follow-up-Left second third and fourth distal interphalangeal joint resection with flexor tenotomy - Left   • Other hyperlipidemia   • Primary osteoarthritis of right knee   • OA (osteoarthritis) of knee   • Hypertension       PAST MEDICAL HISTORY  Past Medical History:   Diagnosis Date   • Anemia    • Anti-phospholipid syndrome     On lifelong anticoagulation therapy   • Arthritis     RIGHT KNEE   • Bladder disorder     LEAKAGE   ON MED  wers pads   • Community acquired pneumonia     HISTORY OF IN 2014    • Deep venous thrombosis 2006, 2008    Left lower extremity multiple   • Depression    • Esophageal carcinoma 12/31/2014    had chemo and radiation prior surgery   • Fatigue    • Hammer toe of left foot     had surgery   • Hemorrhoids    • HH (hiatus hernia)    • History of atrial fibrillation 2015    ONE EPISODE WHILE HOSPITALIZED   • History of kidney stones    • History of nephrolithiasis    • History of pancreatitis     PT STATES MANY YEARS AGO   • History of radiation therapy    • Hypertension    • Long-term (current) use of anticoagulants, INR goal 2.0-3.0    • Malignant neoplasm of prostate    • Restless legs syndrome    • Squamous carcinoma     on the head   • Stasis dermatitis    • Warfarin anticoagulation        SURGICAL HISTORY  Past Surgical History:   Procedure Laterality Date   • APPENDECTOMY  1950   • BRONCHOSCOPY      (Diagnostic)   • CATARACT EXTRACTION Bilateral 2014   • COLONOSCOPY  12/15/2014    Complete / Description: EH, IH, torts, stool, follow-up colonoscopy due in 5 years.   • COLONOSCOPY N/A 6/13/2017    Procedure: COLONOSCOPY TO CECUM AND INTO TERMINAL ILEUM;  Surgeon: Mulugeta BLACKWELL MD;  Location: [facility] ENDOSCOPY;  Service:    • CYSTOSCOPY W/ LASER LITHOTRIPSY     • ENDOSCOPY N/A 6/13/2017    Procedure: ESOPHAGOGASTRODUODENOSCOPY WITH COLD BIOPSY;  Surgeon: Lake Gonzalez MD;  Location: [facility] ENDOSCOPY;  Service:    • ESOPHAGECTOMY      April 2015, stage IIB esophageal carcinoma, sub-total resection.   • ESOPHAGECTOMY      Esophagectomy Subtotal [place] Procedure   • EXCISION LESION  08/2012    Removal of Squamous Cell CA on Head   • HAMMER TOE REPAIR  09/2014    Hammertoe Operation (Each Toe), 10/2014   • HAMMER TOE REPAIR Left 10/3/2017    Procedure: Left second third and fourth distal interphalangeal joint resection with flexor tenotomy;  Surgeon: Mulugeta Lira MD;  [place] [facility] [place] [facility] [place]  Service:    • HERNIA REPAIR      incisional   • JEJUNOSTOMY       Laparoscopic   • JEJUNOSTOMY      tube removal    • KNEE SURGERY Bilateral 1967, 1973, 1981   • KNEE SURGERY Right 03/26/2018    TKR   • PATELLA SURGERY Left     removed   • PILONIDAL CYST / SINUS EXCISION     • CO TOTAL KNEE ARTHROPLASTY Right 3/26/2018    Procedure: TOTAL KNEE ARTHROPLASTY;  Surgeon: Renny Solis MD;  Location: [facility];  Service: Orthopedics   • PROSTATECTOMY  2010   • PYLOROPLASTY     • SPINAL FUSION  02/1998    C 5,6   • TONSILLECTOMY  1955   • TONSILLECTOMY     • UPPER GASTROINTESTINAL ENDOSCOPY  12/15/2014    [place] Grade D esophagitis, Pardo's, HH, multiple duodenal ulcers   • UPPER GASTROINTESTINAL ENDOSCOPY      (Therapeutic)   • VENTRAL/INCISIONAL HERNIA REPAIR N/A 4/14/2016    Procedure: VENTRAL/INCISIONAL HERNIA REPAIR, open, with mesh, and component separation;  Surgeon: Darren Rivas MD;  Location: [facility];  Service:        SOCIAL HISTORY  Social History     Social History   • Marital status:      Spouse name: Lena   • Number of children: 0   • Years of education: College     Occupational History   •  Self-Employed   •  Retired     Social History Main Topics   • Smoking status: Former Smoker     Packs/day: 2.00     Years: 2.00     Types: Cigarettes     Quit date: 1974   • Smokeless tobacco: Never Used   • Alcohol use 1.8 oz/week     1 Glasses of wine, 1 Cans of beer, 1 Shots of liquor per week   • Drug use: No   • Sexual activity: Defer     Other Topics Concern   • Not on file     Social History Narrative    self-employed        FAMILY HISTORY  Family History   Problem Relation Age of Onset   • Cerebral aneurysm Mother      cerebral artery aneurysm   • Prostate cancer Brother 68   • Anxiety disorder Father    • Malig Hyperthermia Neg Hx          **Dragon Disclaimer:   Much of this encounter note is an electronic transcription/translation of spoken language to printed text. The electronic translation of spoken language may permit  erroneous, or at times, nonsensical words or phrases to be inadvertently transcribed. Although I have reviewed the note for such errors, some may still exist.

## 2018-04-13 ENCOUNTER — OFFICE VISIT (OUTPATIENT)
Dept: PHYSICAL THERAPY | Facility: CLINIC | Age: 68
End: 2018-04-13

## 2018-04-13 DIAGNOSIS — Z96.651 TOTAL KNEE REPLACEMENT STATUS, RIGHT: Primary | ICD-10-CM

## 2018-04-13 DIAGNOSIS — R26.9 ABNORMAL GAIT: ICD-10-CM

## 2018-04-13 DIAGNOSIS — M25.561 RIGHT KNEE PAIN, UNSPECIFIED CHRONICITY: ICD-10-CM

## 2018-04-13 PROCEDURE — 97140 MANUAL THERAPY 1/> REGIONS: CPT | Performed by: PHYSICAL THERAPIST

## 2018-04-13 PROCEDURE — 97110 THERAPEUTIC EXERCISES: CPT | Performed by: PHYSICAL THERAPIST

## 2018-04-13 PROCEDURE — 97530 THERAPEUTIC ACTIVITIES: CPT | Performed by: PHYSICAL THERAPIST

## 2018-04-13 NOTE — PROGRESS NOTES
Physical Therapy Daily Progress Note    Time In 0930  Time Out 1025    Jose Hurtado reports:     Subjective     Objective       Observations     Right Knee   Positive for edema.     Additional Observation Details  Ambulates with rolling walking in/out of clinic  Noted antalgia R LE with decreased step/stride length.  Noted decreased capability to full extend R LE    Active Range of Motion     Right Knee   Flexion: 110 degrees with pain  Extension: 5 (lacking 5 deg from 0) degrees with pain     See Exercise, Manual, and Modality Logs for complete treatment.   Added heel slides/stretches with strap, supine hamstring stretch, seated gastroc stretch, quad sets with SLR supine  Exercise rationale, progression   Anatomy/structure of affected musculature.   Continued emphasis on increased ice application at home.  Discussed questions with patient and caregiver present.       Assessment/Plan  Presents with increased noted edema right knee with subsequent decrease in pain free capability for right knee flexion and extension. Able to progress exercises and activity without increased symptoms as well as good performance and recall.  AROM improved versus initial measurement R LE>      Progress strengthening /stabilization /functional activity as symptoms allow. Continue with manual efforts.           Manual Therapy:    10     mins  53512;  Therapeutic Exercise:      10   mins  89617;     Neuromuscular Wil:        mins  50308;    Therapeutic Activity:   30    mins  78311;     Gait Training:          mins  36717;     Ultrasound:          mins  39376;    Electrical Stimulation:       mins  05583 ( );      Timed Treatment: 50  mins   Total Treatment:    55  mins    Rey Sterling PTA    Physical Therapist Assistant [phone]

## 2018-04-16 ENCOUNTER — OFFICE VISIT (OUTPATIENT)
Dept: PHYSICAL THERAPY | Facility: CLINIC | Age: 68
End: 2018-04-16

## 2018-04-16 DIAGNOSIS — Z96.651 TOTAL KNEE REPLACEMENT STATUS, RIGHT: Primary | ICD-10-CM

## 2018-04-16 DIAGNOSIS — M17.11 PRIMARY OSTEOARTHRITIS OF RIGHT KNEE: ICD-10-CM

## 2018-04-16 DIAGNOSIS — R26.9 ABNORMAL GAIT: ICD-10-CM

## 2018-04-16 PROCEDURE — G0283 ELEC STIM OTHER THAN WOUND: HCPCS | Performed by: PHYSICAL THERAPIST

## 2018-04-16 PROCEDURE — 97140 MANUAL THERAPY 1/> REGIONS: CPT | Performed by: PHYSICAL THERAPIST

## 2018-04-16 PROCEDURE — 97110 THERAPEUTIC EXERCISES: CPT | Performed by: PHYSICAL THERAPIST

## 2018-04-16 PROCEDURE — 97530 THERAPEUTIC ACTIVITIES: CPT | Performed by: PHYSICAL THERAPIST

## 2018-04-16 NOTE — PROGRESS NOTES
Physical Therapy Daily Progress Note    Time In 0930  Time Out 1100    Jose Estevezut reports: worked hard on exercises over the weekend, feel like I made myself really sore and that knee doesn't want to bend as well.    Subjective     Objective       Observations     Right Knee   Positive for edema.     Additional Observation Details  Noted increased edema diffusely right knee.  Noted increased pocket of edema along right popliteal space.    No noted redness right knee/calf or increased tenderness    Active Range of Motion     Right Knee   Flexion: 95 (limited by swelling popliteal space) degrees with pain  Extension: 5 (lacks 5 deg from 0) degrees with pain    Swelling     Right Knee Girth Measurement (cm)   Joint line: 50 (49.5 cm  pre PT/estim 48..5 cm post PT/estim/ice) cm    Ambulation     Observational Gait     Additional Observational Gait Details  Ambulates with rolling walker and noted increased knee flexion R LE  Ambulates with dependence on assistive device as well as decreased step/stride length.     See Exercise, Manual, and Modality Logs for complete treatment.   Added:  SAQ, supine extension hang with 5 lbs x 4 min;   Continued emphasis on ice for edema control.      Assessment/Plan  Presents this session with increased right knee swelling/edema with noted pocket edema in/along popliteal space.  No noted redness or tenderness/knee or calf.  Swelling affects pain free flexion with manual efforts.  Able to progress exercise regimen for right quad without discomfort, though fatigues easily and requires frequent rest breaks.  Ambulates with noted antalgia increased knee flexion.  ROM right knee decreased versus last measurement due to swelling.  Girth measurement decreased post application of high volt stimulation and ice.    Progress per Plan of Care and Progress strengthening /stabilization /functional activity as symptoms allow.  Measure girth next visit to see if improvement.           Manual Therapy:    13     mins  77274;  Therapeutic Exercise:   18     mins  70982;     Neuromuscular Wil:     mins  44529;    Therapeutic Activity:    20     mins  60925;     Gait Training:           mins  87042;     Ultrasound:         mins  53847;    Electrical Stimulation:   15    mins  81160 ( );      Timed Treatment:  51   mins   Total Treatment:    90   mins    Rey Sterling PTA    Physical Therapist Assistant [place] [address]

## 2018-04-17 ENCOUNTER — TELEPHONE (OUTPATIENT)
Dept: INTERNAL MEDICINE | Age: 68
End: 2018-04-17

## 2018-04-17 NOTE — TELEPHONE ENCOUNTER
Pt called to inform Dr Mooney he was seen at [facility] ER ([facility]) last night because he was throwing up some blood. Pt stated he was diagnosed with irritated stomach and esophagus.  Pt's # [phone]  Thanks SP

## 2018-04-17 NOTE — TELEPHONE ENCOUNTER
Have him continue the medications that were prescribed at the emergency department.  If he has any additional problems with this issue, please let me know, otherwise keep appointment with me on May 10 as previously scheduled.

## 2018-04-18 ENCOUNTER — OFFICE VISIT (OUTPATIENT)
Dept: PHYSICAL THERAPY | Facility: CLINIC | Age: 68
End: 2018-04-18

## 2018-04-18 DIAGNOSIS — Z96.651 TOTAL KNEE REPLACEMENT STATUS, RIGHT: Primary | ICD-10-CM

## 2018-04-18 DIAGNOSIS — R26.9 ABNORMAL GAIT: ICD-10-CM

## 2018-04-18 DIAGNOSIS — M17.11 PRIMARY OSTEOARTHRITIS OF RIGHT KNEE: ICD-10-CM

## 2018-04-18 PROCEDURE — 97110 THERAPEUTIC EXERCISES: CPT | Performed by: PHYSICAL THERAPIST

## 2018-04-18 PROCEDURE — G0283 ELEC STIM OTHER THAN WOUND: HCPCS | Performed by: PHYSICAL THERAPIST

## 2018-04-18 PROCEDURE — 97140 MANUAL THERAPY 1/> REGIONS: CPT | Performed by: PHYSICAL THERAPIST

## 2018-04-19 NOTE — PROGRESS NOTES
Physical Therapy Daily Progress Note    Time In 0930 Time Out 1050    Jose Hurtado reports: right knee feels a little better, seems to be less swollen than previous session.  States he had to visit ER last night due to vomiting/coughing up some blood yesterday.    Subjective     Objective       Swelling     Right Knee Girth Measurement (cm)   Joint line: 49 (cm pre PT; 48 cm post PT/estim) cm    Ambulation     Observational Gait     Additional Observational Gait Details  Ambulates with rolling walker.  Noted holding right knee in partial flexion position with ambulation.     See Exercise, Manual, and Modality Logs for complete treatment.       Assessment/Plan  Presents with lessened observed swelling right knee but continues to ambulate and exhibit decreased right knee extension.  PROM limited both flexion and extension planes due to guarding/discomfort and tightness of affected musculature.  Exhibits good performance of current exercise regimen.  Quad contraction improving with SLR performance.    Progress per Plan of Care toward all goals. Continue with manual efforts to increase right knee ROM and modalities for edema control.           Manual Therapy:   10      mins  54619;  Therapeutic Exercise:   25    mins  37469;     Neuromuscular Wil:      mins  29722;    Therapeutic Activity:      5  mins  25924;     Gait Training:           mins  91358;     Ultrasound:      mins  98698;    Electrical Stimulation: 15       mins  70219 ( );      Timed Treatment: 40  mins   Total Treatment:   80    mins    Rey Sterling PTA    Physical Therapist Assistant [place] [address]

## 2018-04-20 ENCOUNTER — OFFICE VISIT (OUTPATIENT)
Dept: PHYSICAL THERAPY | Facility: CLINIC | Age: 68
End: 2018-04-20

## 2018-04-20 DIAGNOSIS — Z96.651 TOTAL KNEE REPLACEMENT STATUS, RIGHT: Primary | ICD-10-CM

## 2018-04-20 PROCEDURE — 97140 MANUAL THERAPY 1/> REGIONS: CPT | Performed by: PHYSICAL THERAPIST

## 2018-04-20 PROCEDURE — 97110 THERAPEUTIC EXERCISES: CPT | Performed by: PHYSICAL THERAPIST

## 2018-04-23 ENCOUNTER — TELEPHONE (OUTPATIENT)
Dept: INTERNAL MEDICINE | Age: 68
End: 2018-04-23

## 2018-04-23 ENCOUNTER — OFFICE VISIT (OUTPATIENT)
Dept: PHYSICAL THERAPY | Facility: CLINIC | Age: 68
End: 2018-04-23

## 2018-04-23 ENCOUNTER — ANTICOAGULATION VISIT (OUTPATIENT)
Dept: INTERNAL MEDICINE | Age: 68
End: 2018-04-23

## 2018-04-23 DIAGNOSIS — Z96.651 TOTAL KNEE REPLACEMENT STATUS, RIGHT: Primary | ICD-10-CM

## 2018-04-23 LAB — INR PPP: 3.5 (ref 2–3)

## 2018-04-23 PROCEDURE — 97110 THERAPEUTIC EXERCISES: CPT | Performed by: PHYSICAL THERAPIST

## 2018-04-23 PROCEDURE — 85610 PROTHROMBIN TIME: CPT | Performed by: INTERNAL MEDICINE

## 2018-04-23 PROCEDURE — 97140 MANUAL THERAPY 1/> REGIONS: CPT | Performed by: PHYSICAL THERAPIST

## 2018-04-23 PROCEDURE — 36416 COLLJ CAPILLARY BLOOD SPEC: CPT | Performed by: INTERNAL MEDICINE

## 2018-04-23 PROCEDURE — 97530 THERAPEUTIC ACTIVITIES: CPT | Performed by: PHYSICAL THERAPIST

## 2018-04-23 NOTE — PROGRESS NOTES
Physical Therapy Daily Progress Note    Time In 0935  Time Out 1040    Jose Hurtado reports: worked hard on exercises and ROM over weekend.  Using cane now versus walker at home and when out.    Subjective     Objective   See Exercise, Manual, and Modality Logs for complete treatment.   Ambulated in clinic with straight cane and verbal cues for proper gait sequencing and right knee extension  Added // bar ambulation with unilateral support to include high marches and 4 inch step up/over/down        Assessment/Plan  Presents with straight cane as assistive device versus rolling walker.  Exhibits steadiness of gait though still benefits from cuing for proper sequencing, step and stride length as well as verbal reminders for posture and knee extension.  Quad/hip weakness evident with step over/down activities.    Progress per Plan of Care toward all goals           Manual Therapy:   13   mins  78336;  Therapeutic Exercise: 30 mins  73378;     Neuromuscular Wil:     mins  01953;    Therapeutic Activity:   12     mins  35047;     Gait Training:        mins  15662;     Ultrasound:       mins  90349;    Electrical Stimulation:         mins  83566 ( );      Timed Treatment:55  mins   Total Treatment:     65   mins    Rey Sterling PTA    Physical Therapist Assistant [place] [address]

## 2018-04-23 NOTE — TELEPHONE ENCOUNTER
----- Message from George Mooney MD sent at 4/23/2018  2:05 PM EDT -----  INR remains elevated, no Coumadin ×48 hours, repeat INR 2 days,25 April 2018

## 2018-04-25 ENCOUNTER — OFFICE VISIT (OUTPATIENT)
Dept: PHYSICAL THERAPY | Facility: CLINIC | Age: 68
End: 2018-04-25

## 2018-04-25 DIAGNOSIS — Z96.651 TOTAL KNEE REPLACEMENT STATUS, RIGHT: Primary | ICD-10-CM

## 2018-04-25 DIAGNOSIS — R26.9 ABNORMAL GAIT: ICD-10-CM

## 2018-04-25 PROCEDURE — 97530 THERAPEUTIC ACTIVITIES: CPT | Performed by: PHYSICAL THERAPIST

## 2018-04-25 PROCEDURE — 97140 MANUAL THERAPY 1/> REGIONS: CPT | Performed by: PHYSICAL THERAPIST

## 2018-04-25 PROCEDURE — 97110 THERAPEUTIC EXERCISES: CPT | Performed by: PHYSICAL THERAPIST

## 2018-04-25 NOTE — PROGRESS NOTES
Physical Therapy Daily Progress Note    Time In 0930  Time Out 1040    Jose Hurtado reports:  Feeling stronger in right knee, though bending knee still gives me most trouble.    Subjective     Objective       Ambulation     Observational Gait     Additional Observational Gait Details  Presents ambulating with straight cane     See Exercise, Manual, and Modality Logs for complete treatment.   Added bridges with ball squeezes, leg press machine B LE      Assessment/Plan  Continues to progress well in regards to exercise capability without increased symptoms.  Manual efforts right knee flexion still encounter self guarding and tightness post 110 deg.  Passive extension full but ambulates with increased right knee flexion, but able to correct/decrease with verbal cues.    Progress strengthening /stabilization /functional activity           Manual Therapy: 13       mins  00972;  Therapeutic Exercise:   35    mins  39993;     Neuromuscular Wil:        mins  15576;    Therapeutic Activity:     15     mins  57770;     Gait Training:           mins  50835;     Ultrasound:        mins  71156;    Electrical Stimulation:       mins  32094 ( );      Timed Treatment: 63   mins   Total Treatment:    70    mins    Rey Sterling PTA    Physical Therapist Assistant [place] [address]

## 2018-04-26 ENCOUNTER — HOSPITAL ENCOUNTER (OUTPATIENT)
Dept: GENERAL RADIOLOGY | Facility: HOSPITAL | Age: 68
Discharge: HOME | End: 2018-04-26
Attending: UROLOGY | Admitting: UROLOGY

## 2018-04-26 ENCOUNTER — TRANSCRIBE ORDERS (OUTPATIENT)
Dept: ADMINISTRATIVE | Facility: HOSPITAL | Age: 68
End: 2018-04-26

## 2018-04-26 DIAGNOSIS — N20.0 URIC ACID NEPHROLITHIASIS: ICD-10-CM

## 2018-04-26 DIAGNOSIS — N20.0 URIC ACID NEPHROLITHIASIS: Primary | ICD-10-CM

## 2018-04-26 PROCEDURE — 74018 RADEX ABDOMEN 1 VIEW: CPT

## 2018-04-26 NOTE — PROGRESS NOTES
Physical Therapy Daily Progress Note    Time In  1530 Time Out 1635    Jose Hurtado reports: that he feels the knee is slowly getting better.    Subjective     Objective       Active Range of Motion     Right Knee   Flexion: 108 (supine degrees) degrees with pain  Extension: 2 (lacks 2 degrees from 0) degrees     Passive Range of Motion     Right Knee   Flexion: 114 (supine  degrees) degrees   Extension: 0 degrees      See Exercise, Manual, and Modality Logs for complete treatment.   VMO SLR added along with hamstring curls on swiss ball  Increased t band with TKE      Assessment/Plan  Pt continues to exhibit right knee extension deficit with ambulation.  Tightness/guarding/pain affect PROM flexion movement, passive extension improving.  A/PROM is improved this session versus previous measurement, albeit not without effort.    Progress strengthening /stabilization /functional activity R knee.  To bring straight cane next visit to begin transition from rolling walker to cane.           Manual Therapy:  10       mins  74844;  Therapeutic Exercise:    35   mins  47288;     Neuromuscular Wil:        mins  51035;    Therapeutic Activity:     5     mins  72113;     Gait Training:         mins  48080;     Ultrasound:        mins  45783;    Electrical Stimulation:       mins  17438 ( );      Timed Treatment: 50  mins   Total Treatment:  65    mins    Rey Sterling PTA    Physical Therapist Assistant [place] [address]

## 2018-04-27 ENCOUNTER — TELEPHONE (OUTPATIENT)
Dept: INTERNAL MEDICINE | Age: 68
End: 2018-04-27

## 2018-04-27 ENCOUNTER — OFFICE VISIT (OUTPATIENT)
Dept: PHYSICAL THERAPY | Facility: CLINIC | Age: 68
End: 2018-04-27

## 2018-04-27 DIAGNOSIS — R26.9 ABNORMAL GAIT: ICD-10-CM

## 2018-04-27 DIAGNOSIS — Z96.651 TOTAL KNEE REPLACEMENT STATUS, RIGHT: Primary | ICD-10-CM

## 2018-04-27 PROCEDURE — 97116 GAIT TRAINING THERAPY: CPT | Performed by: PHYSICAL THERAPIST

## 2018-04-27 PROCEDURE — 97530 THERAPEUTIC ACTIVITIES: CPT | Performed by: PHYSICAL THERAPIST

## 2018-04-27 PROCEDURE — 97110 THERAPEUTIC EXERCISES: CPT | Performed by: PHYSICAL THERAPIST

## 2018-04-27 NOTE — TELEPHONE ENCOUNTER
Pt called stating he tested himself for his INR and reading last night was 1.9, so pt said, he took 5 mg of Coumadin last night also.   Pt said, if Dr Mooney wants to change his medication/ dosage to call him at # [phone]  Thanks SP

## 2018-04-27 NOTE — PROGRESS NOTES
Physical Therapy Daily Progress Note    Time In 0930  Time Out 1040    Jose Stout reports: feels that his knee is continuing to come along     Subjective     Objective   See Exercise, Manual, and Modality Logs for complete treatment.   Added partial squats to chair x 10 reps    Gait training with cane in clinic x 10 min.  Ambulates with straight cane and SBA x 1 up and down 60 foot walking track x 4 times.  Ambulates up/down stair module with both step to and reciprocal stepping up/down 4 steps x 10 times      Assessment/Plan  Presents with improved stability and capability for ambulation with assistive device with noted improved posture, step/stride length and stair climbing without noted LOB episodes.  Progressing well with current exercise regimen.  AROM improving both flexion and extension planes(observed), though evident tightness remains with right knee flexion.    Progress strengthening /stabilization /functional activity           Manual Therapy:         mins  70129;  Therapeutic Exercise:    30    mins  26490;     Neuromuscular Wil:        mins  20545;    Therapeutic Activity:   10     mins  94459;     Gait Training:   10     mins  57716;     Ultrasound:        mins  37218;    Electrical Stimulation:        mins  64244 ( );      Timed Treatment: 50   mins   Total Treatment:   70  mins    Rey Sterling PTA    Physical Therapist Assistant [place] [address]

## 2018-04-30 ENCOUNTER — OFFICE VISIT (OUTPATIENT)
Dept: PHYSICAL THERAPY | Facility: CLINIC | Age: 68
End: 2018-04-30

## 2018-04-30 DIAGNOSIS — Z96.651 TOTAL KNEE REPLACEMENT STATUS, RIGHT: Primary | ICD-10-CM

## 2018-04-30 PROCEDURE — 97110 THERAPEUTIC EXERCISES: CPT | Performed by: PHYSICAL THERAPIST

## 2018-04-30 PROCEDURE — 97140 MANUAL THERAPY 1/> REGIONS: CPT | Performed by: PHYSICAL THERAPIST

## 2018-04-30 NOTE — PROGRESS NOTES
Physical Therapy Daily Progress Note    Time In 0930 Time Out 1030    Jose Hurtado reports:  R knee is continuing to feel better each treatment session. Patient verbalizes compliance with HEP over the weekend.    Subjective     Objective       Active Range of Motion     Right Knee   Flexion: 113 degrees with pain  Extension: 5 (lacking 5 deg) degrees     General Comments     Knee Comments  Diffuse/global swelling in the R knee.     See Exercise, Manual, and Modality Logs for complete treatment.       Assessment/Plan  Progressing well with rehab efforts.  Ambulating better with improved posture and positioning of right knee and demonstrates improved AROM flexion right knee. Remains complaint, cooperative and motivated with rehab efforts.    Progress per Plan of Care toward all goals           Manual Therapy:   10      mins  24234;  Therapeutic Exercise:     30    mins  57648;     Neuromuscular Wil:        mins  02377;    Therapeutic Activity:          mins  87519;     Gait Training:         mins  81425;     Ultrasound:          mins  25573;    Electrical Stimulation:      mins  85896 ( );      Timed Treatment:   40   mins   Total Treatment:     60_ mins    Rey Sterling PTA    Physical Therapist Assistant [place] [address]

## 2018-05-02 ENCOUNTER — OFFICE VISIT (OUTPATIENT)
Dept: PHYSICAL THERAPY | Facility: CLINIC | Age: 68
End: 2018-05-02

## 2018-05-02 DIAGNOSIS — Z96.651 TOTAL KNEE REPLACEMENT STATUS, RIGHT: Primary | ICD-10-CM

## 2018-05-02 DIAGNOSIS — R26.9 ABNORMAL GAIT: ICD-10-CM

## 2018-05-02 PROCEDURE — 97530 THERAPEUTIC ACTIVITIES: CPT | Performed by: PHYSICAL THERAPIST

## 2018-05-02 PROCEDURE — 97140 MANUAL THERAPY 1/> REGIONS: CPT | Performed by: PHYSICAL THERAPIST

## 2018-05-02 PROCEDURE — 97110 THERAPEUTIC EXERCISES: CPT | Performed by: PHYSICAL THERAPIST

## 2018-05-03 NOTE — PROGRESS NOTES
Physical Therapy Daily Progress Note    Time In 930  Time Out 1035    Jose Hurtado reports:     Subjective     Objective       Active Range of Motion     Right Knee   Flexion: 108 degrees      AAROM right knee flexion 114 deg  AAROM right knee extension -2 deg    See Exercise, Manual, and Modality Logs for complete treatment.   Added 8 inch step up and over x 20 reps   Ambulating without straight cane 60 foot walking track x 4 and SBA      Assessment/Plan Pt remains complaint/cooperative and motivated.  Continues to push self with exercise regimen and ROM measurements.  Gait is improving with pt exhibiting increased awareness of his posture.  Able to ambulate short distances without assistive device but benefits from frequent rest breaks    Other  Check progression toward goals next visit.  Check mm strength right LE           Manual Therapy: 10      mins  10131;  Therapeutic Exercise:    28     mins  44934;     Neuromuscular Wil:      mins  74510;    Therapeutic Activity:   10       mins  43786;     Gait Trainin     mins  85776;     Ultrasound:        mins  95226;    Electrical Stimulation:      mins  49153 ( );      Timed Treatment:  53  mins   Total Treatment:    65    mins    Rey Sterling PTA    Physical Therapist Assistant [place] [address]

## 2018-05-04 ENCOUNTER — OFFICE VISIT (OUTPATIENT)
Dept: PHYSICAL THERAPY | Facility: CLINIC | Age: 68
End: 2018-05-04

## 2018-05-04 DIAGNOSIS — Z96.651 TOTAL KNEE REPLACEMENT STATUS, RIGHT: Primary | ICD-10-CM

## 2018-05-04 PROCEDURE — 97110 THERAPEUTIC EXERCISES: CPT | Performed by: PHYSICAL THERAPIST

## 2018-05-04 PROCEDURE — 97530 THERAPEUTIC ACTIVITIES: CPT | Performed by: PHYSICAL THERAPIST

## 2018-05-04 NOTE — PROGRESS NOTES
"8Physical Therapy Daily Progress Note    Time In 0915  Time Out 1015    Jose Hurtado reports: increase in pain (3/10) in R knee today due to \"overworking\" it on his bike at home.     Subjective     Objective       Ambulation   Weight-Bearing Status   Assistive device used: single point cane    Observational Gait     Additional Observational Gait Details  Patient's R knee remains slightly flexed during swing through phase of gait. Verbal/tactile cues were utilized for proper posture and to exaggerate knee extension during heel strike.     See Exercise, Manual, and Modality Logs for complete treatment.   Gait training was utilized on walking track (60ft.) down and back 3x to assess patient's current gait pattern. Observations included above.  Added fwd and lateral step ups on 8\" step up 2 x 10 each.  Encouraged patient to continue current HEP, LE bike, and ice at home. Patient verbalized understand of HEP regimen.      Assessment/Plan  Patient exhibits symptoms of pain/discomfort during today's therapy session. His current exercise regimen was continued with the addition of forward and lateral step ups to enhance patient's demonstration/confidence with stair use. Patient demonstrates appropriate gait pattern based off the current condition of his R knee. After observation, patient exhibits slight knee flexion during swing phase and forward trunk lean. Patient benefits from physical therapy for verbal/tactile cueing throughout ambulation. Patient remains complaint/cooperative/motivated in rehab efforts to increase R knee A/PROM, strength, and enhance gait pattern to a more normalized gait pattern.     Progress strengthening /stabilization /functional activity in affected musculature in R knee.           Manual Therapy:         mins  61461;  Therapeutic Exercise:    38     mins  56181;     Neuromuscular Wil:        mins  44514;    Therapeutic Activity:    8      mins  56919;     Gait Training:           mins  79533;   "   Ultrasound:          mins  36957;    Electrical Stimulation:         mins  98428 ( );      Timed Treatment:   46   mins   Total Treatment:     60   mins    Rey Sterling, PTA    Physical Therapist Assistant [place] [address]

## 2018-05-07 ENCOUNTER — OFFICE VISIT (OUTPATIENT)
Dept: PHYSICAL THERAPY | Facility: CLINIC | Age: 68
End: 2018-05-07

## 2018-05-07 DIAGNOSIS — Z96.651 TOTAL KNEE REPLACEMENT STATUS, RIGHT: Primary | ICD-10-CM

## 2018-05-07 PROCEDURE — 97110 THERAPEUTIC EXERCISES: CPT | Performed by: PHYSICAL THERAPIST

## 2018-05-07 NOTE — PROGRESS NOTES
"Physical Therapy Daily Progress Note    Time In 0930  Time Out 1030    Jose Hurtado reports: I know I am walking better and can tell that standing up straighter makes my walking feel \"smoother\".      Subjective     Objective       Active Range of Motion   Left Knee   Flexion: 111 (supine) degrees     Passive Range of Motion   Left Knee   Flexion: 115 (supine) degrees      See Exercise, Manual, and Modality Logs for complete treatment.   Emphasis on continued home exercise performance for strengthening as well as continuation of home stretches for knee flexion and extension.  Verbal and tactile cues for proper exercise technique and performance as well as awareness of posture and R LE with ambulation.      Assessment/Plan  Progressing well with therapeutic exercise regimen.  Continues to benefit from verbal/tactile cues to ensure proper exercise technique, hold time and performance to maximize mm strength and control.  Continues to ambulate with right knee held in increased flexion position but can correct with verbal/tactile cues with only mild extension deficit noted.  A/PROM right knee continues to slowly improve.    Progress strengthening /stabilization /functional activity as able to increase ROM           Manual Therapy:         mins  96562;  Therapeutic Exercise:   40    mins  06791;     Neuromuscular Wil:     mins  58029;    Therapeutic Activity:    5    mins  60481;     Gait Training:         mins  60158;     Ultrasound:          mins  73031;    Electrical Stimulation:       mins  18843 ( );      Timed Treatment:  45    mins   Total Treatment:     60   mins    Rey Sterling PTA    Physical Therapist Assistant [place] [address]    "

## 2018-05-08 ENCOUNTER — TELEPHONE (OUTPATIENT)
Dept: ORTHOPEDIC SURGERY | Facility: CLINIC | Age: 68
End: 2018-05-08

## 2018-05-08 RX ORDER — HYDROCODONE BITARTRATE AND ACETAMINOPHEN 7.5; 325 MG/1; MG/1
1 TABLET ORAL EVERY 4 HOURS PRN
Qty: 60 TABLET | Refills: 0 | Status: SHIPPED | OUTPATIENT
Start: 2018-05-08 | End: 2018-07-31

## 2018-05-09 ENCOUNTER — OFFICE VISIT (OUTPATIENT)
Dept: PHYSICAL THERAPY | Facility: CLINIC | Age: 68
End: 2018-05-09

## 2018-05-09 DIAGNOSIS — Z96.651 TOTAL KNEE REPLACEMENT STATUS, RIGHT: Primary | ICD-10-CM

## 2018-05-09 DIAGNOSIS — R26.9 ABNORMAL GAIT: ICD-10-CM

## 2018-05-09 PROCEDURE — 97140 MANUAL THERAPY 1/> REGIONS: CPT | Performed by: PHYSICAL THERAPIST

## 2018-05-09 PROCEDURE — 97110 THERAPEUTIC EXERCISES: CPT | Performed by: PHYSICAL THERAPIST

## 2018-05-09 NOTE — PROGRESS NOTES
Physical Therapy Daily Progress Note    Time In 0915  Time Out 1030    Jose Hurtado reports: R knee feels pretty sore today. Patient reports completion of HEP on days he does not have therapy and states he does feels like his R knee pain/mobility is continuing to improve.    Subjective     Objective       Ambulation     Observational Gait   Decreased walking speed.     Additional Observational Gait Details  Patient benefits from verbal/tactile cuing to improve posture and exaggerate heel strike to get full extension in his R knee.     See Exercise, Manual, and Modality Logs for complete treatment.   Progressed:  Partial squat to chair in // bars 20x compared to 10x last session; black Tband for HS curls,   Added:  Standing 4way hip 2 x 10 B LE's with blue Tband.  Patient was encouraged to continue HEP and use of ice at home. Patient verbalized understanding of HEP.    Assessment/Plan   Compliant/cooperative/motivated with rehab efforts.  Able to progress exercise regimen without increased right knee pain/discomfort.  Continues to present with tightness in hamstrings and quad along with noted hip/glut weakness with partial squat activity.  Continued manual efforts along with home stretching should allow for continued improvement of joint motion/mobility before next MD follow up.  Met all STG's and LTG #2 as established for patient upon initial evaluation.    Progress strengthening /stabilization /functional activity affected musculature in R knee.   Re eval next session by primary PT.  Recheck  LE functional Index Questionnaire.           Manual Therapy:     10    mins  96964;  Therapeutic Exercise:    40     mins  46358;     Neuromuscular Wil:      mins  44361;    Therapeutic Activity:         mins  86789;     Gait Training:           mins  61966;     Ultrasound:          mins  87572;    Electrical Stimulation:         mins  02787 ( );      Timed Treatment:   50   mins   Total Treatment:    75   mins    Rey  PATRICIO Sterling, [facility]    Physical Therapist Assistant [place] [address]

## 2018-05-10 ENCOUNTER — TELEPHONE (OUTPATIENT)
Dept: INTERNAL MEDICINE | Age: 68
End: 2018-05-10

## 2018-05-10 ENCOUNTER — OFFICE VISIT (OUTPATIENT)
Dept: INTERNAL MEDICINE | Age: 68
End: 2018-05-10

## 2018-05-10 VITALS
HEIGHT: 77 IN | OXYGEN SATURATION: 97 % | BODY MASS INDEX: 28.34 KG/M2 | SYSTOLIC BLOOD PRESSURE: 138 MMHG | DIASTOLIC BLOOD PRESSURE: 72 MMHG | HEART RATE: 75 BPM | WEIGHT: 240 LBS | TEMPERATURE: 98.1 F

## 2018-05-10 DIAGNOSIS — F32.9 REACTIVE DEPRESSION: ICD-10-CM

## 2018-05-10 DIAGNOSIS — D68.61 ANTI-PHOSPHOLIPID SYNDROME (HCC): ICD-10-CM

## 2018-05-10 DIAGNOSIS — I10 ESSENTIAL HYPERTENSION: ICD-10-CM

## 2018-05-10 DIAGNOSIS — I82.4Y9 DEEP VEIN THROMBOSIS (DVT) OF PROXIMAL LOWER EXTREMITY, UNSPECIFIED CHRONICITY, UNSPECIFIED LATERALITY (HCC): Primary | ICD-10-CM

## 2018-05-10 LAB
ALBUMIN SERPL-MCNC: 3.7 G/DL (ref 3.5–5.2)
ALBUMIN/GLOB SERPL: 1.4 G/DL
ALP SERPL-CCNC: 124 U/L (ref 39–117)
ALT SERPL-CCNC: 15 U/L (ref 1–41)
AST SERPL-CCNC: 19 U/L (ref 1–40)
BILIRUB SERPL-MCNC: 0.2 MG/DL (ref 0.1–1.2)
BUN SERPL-MCNC: 18 MG/DL (ref 8–23)
BUN/CREAT SERPL: 19.4 (ref 7–25)
CALCIUM SERPL-MCNC: 8.7 MG/DL (ref 8.6–10.5)
CHLORIDE SERPL-SCNC: 105 MMOL/L (ref 98–107)
CHOLEST SERPL-MCNC: 153 MG/DL (ref 0–200)
CO2 SERPL-SCNC: 25.7 MMOL/L (ref 22–29)
CREAT SERPL-MCNC: 0.93 MG/DL (ref 0.76–1.27)
GFR SERPLBLD CREATININE-BSD FMLA CKD-EPI: 80 ML/MIN/1.73
GFR SERPLBLD CREATININE-BSD FMLA CKD-EPI: 97 ML/MIN/1.73
GLOBULIN SER CALC-MCNC: 2.7 GM/DL
GLUCOSE SERPL-MCNC: 102 MG/DL (ref 65–99)
HDLC SERPL-MCNC: 45 MG/DL (ref 40–60)
INR PPP: 5.2 (ref 2–3)
LDLC SERPL CALC-MCNC: 92 MG/DL (ref 0–100)
POTASSIUM SERPL-SCNC: 4.8 MMOL/L (ref 3.5–5.2)
PROT SERPL-MCNC: 6.4 G/DL (ref 6–8.5)
SODIUM SERPL-SCNC: 143 MMOL/L (ref 136–145)
TRIGL SERPL-MCNC: 79 MG/DL (ref 0–150)
VLDLC SERPL CALC-MCNC: 15.8 MG/DL (ref 5–40)

## 2018-05-10 PROCEDURE — 85610 PROTHROMBIN TIME: CPT | Performed by: INTERNAL MEDICINE

## 2018-05-10 PROCEDURE — 99213 OFFICE O/P EST LOW 20 MIN: CPT | Performed by: INTERNAL MEDICINE

## 2018-05-10 PROCEDURE — 36416 COLLJ CAPILLARY BLOOD SPEC: CPT | Performed by: INTERNAL MEDICINE

## 2018-05-10 RX ORDER — PAROXETINE 30 MG/1
30 TABLET, FILM COATED ORAL NIGHTLY
Qty: 90 TABLET | Refills: 1 | Status: SHIPPED | OUTPATIENT
Start: 2018-05-10 | End: 2018-06-27 | Stop reason: SDUPTHER

## 2018-05-10 NOTE — TELEPHONE ENCOUNTER
----- Message from George Mooney MD sent at 5/10/2018  9:38 AM EDT -----  Please do not use Coumadin on May 10 or 11th.  Check INR at home on Saturday, May 12.  If INR is above 3, do not take Coumadin on Saturday or Sunday and repeat the INR Monday, May 14 and contact me.  If INR is less than 3, use 2.5 mg of Coumadin Saturday and Sunday and repeat INR on Monday, May 14 and call me with results.

## 2018-05-10 NOTE — PROGRESS NOTES
"[facility] [place] INTERNAL MEDICINE        Jose FITCH Hurtado / 68 y.o. / male  05/10/2018      ASSESSMENT & PLAN:    Problem List Items Addressed This Visit        Unprioritized    DVT (deep venous thrombosis) - Primary    Relevant Orders    POCT INR (Completed)    Anti-phospholipid syndrome    Overview     On lifelong anticoagulation therapy         Relevant Medications    warfarin (COUMADIN) 5 MG tablet      Other Visit Diagnoses    None.       Orders Placed This Encounter   Procedures   • POCT INR       Summary/Discussion:    Number-one over anticoagulation without bleeding at this point.  Recommend patient discontinue Coumadin today and tomorrow.  On 12 May (Saturday) sees patient will check his INR at home.  If it is still above 3, he will take no Coumadin over the weekend and on 14 may recheck INR and contact me with results.  If his results is under 3 he can take 2.5 mg Saturday and Sunday and recheck his INR on Monday and contact me with the result.    2 hypertension stable on current medication.  Plan: Same meds, blood pressure check 4 months, patient has appointment scheduled with me for July 31.  Will check CMP and lipid today since he is fasting, follow-up results by mail adjust treatment as indicated.      No Follow-up on file.    ____________________________________________________________________    VITALS    Visit Vitals  /72   Pulse 75   Temp 98.1 °F (36.7 °C)   Ht 195.6 cm (77.01\")   Wt 109 kg (240 lb)   SpO2 97%   BMI 28.45 kg/m²       BP Readings from Last 3 Encounters:   05/10/18 138/72   04/12/18 122/50   03/30/18 125/64     Wt Readings from Last 3 Encounters:   05/10/18 109 kg (240 lb)   04/12/18 109 kg (240 lb 12.8 oz)   04/10/18 109 kg (240 lb)      Body mass index is 28.45 kg/m².    CC:  Main reason(s) for today's visit: Follow-up and Coagulation Disorder (5.7 on 5/10/18, taking 5mg Coumadin daily)      HPI:   Patient presents today for follow-up of coagulopathy.  His history of DVT and " antiphospholipid syndrome.  He is recently status post right-sided knee replacement and on Coumadin.  His been taking his normal dose of 5 mg daily, however today's INR is 5.7.  Patient denied any bleeding from mouth or nose urine or rectum.  He notes that his diet has changed slightly with increased consumption of vegetables over the past several weeks.          Patient Care Team:  George Mooney MD as PCP - General  George Phelan II, MD as Consulting Physician (Hematology and Oncology)  Jose Inman MD as Consulting Physician (Urology)  Mulugeta BLACKWELL MD as Consulting Physician (Gastroenterology)  Seth Solomon MD as Consulting Physician (Cardiology)  Jose Christine III, MD as Referring Physician (Thoracic Surgery)  ____________________________________________________________________    REVIEW OF SYSTEMS    Review of Systems   Respiratory: Negative for chest tightness and shortness of breath.    Cardiovascular: Negative for chest pain and palpitations.   Neurological: Negative for dizziness, syncope, speech difficulty, weakness, light-headedness and headaches.         PHYSICAL EXAMINATION    Physical Exam   Constitutional: He is oriented to person, place, and time. He appears well-developed and well-nourished. No distress.   Cardiovascular: Normal rate, regular rhythm, normal heart sounds and intact distal pulses.  Exam reveals no gallop and no friction rub.    No murmur heard.  Pulmonary/Chest: Effort normal. He has no wheezes. He has no rales.   Decreased breath sounds, secondary to chronic loculated pleural effusion right base   Musculoskeletal: He exhibits edema.   Chronic bilateral edema left greater than right secondary to prior DVT   Neurological: He is alert and oriented to person, place, and time.   Skin: Skin is warm and dry. No rash noted.   Psychiatric: He has a normal mood and affect. His behavior is normal. Judgment and thought content normal.   Nursing note and vitals  reviewed.      REVIEWED DATA:    Labs:     Lab Results   Component Value Date     (L) 03/15/2018    K 4.3 03/15/2018    AST 26 03/08/2018    ALT 19 03/08/2018    BUN 23 03/15/2018    CREATININE 1.16 03/15/2018    CREATININE 1.17 03/08/2018    CREATININE 1.20 03/02/2018    EGFRIFNONA 62 03/15/2018    EGFRIFAFRI  08/30/2016      Comment:      <15 Indicative of kidney failure.       Lab Results   Component Value Date    GLUCOSE 108 (H) 03/15/2018    GLUCOSE 103 03/08/2018    GLUCOSE 86 09/15/2017       Lab Results   Component Value Date     (H) 01/30/2018    LDL 98 01/26/2017    HDL 55 01/30/2018    TRIG 74 01/30/2018       Lab Results   Component Value Date    TSH 3.29 04/28/2015       Lab Results   Component Value Date    WBC 3.44 (L) 03/15/2018    HGB 9.0 (L) 03/30/2018    HGB 7.6 (L) 03/29/2018    HGB 8.4 (L) 03/28/2018     03/15/2018       Imaging:         Medical Tests:         Summary of old records / correspondence / consultant report:         Request outside records:         ALLERGIES  No Known Allergies     MEDICATIONS  Current Outpatient Prescriptions   Medication Sig Dispense Refill   • HYDROcodone-acetaminophen (NORCO) 7.5-325 MG per tablet Take 1 tablet by mouth Every 4 (Four) Hours As Needed for Moderate Pain . 60 tablet 0   • PARoxetine (PAXIL) 30 MG tablet Take 1 tablet by mouth Every Night. 30 tablet 2   • pramipexole (MIRAPEX) 1.5 MG tablet Take 1.5 mg by mouth Daily.     • warfarin (COUMADIN) 5 MG tablet Take 5 mg by mouth Daily. Pt to stop 6 days before surgery & bridge with lovenox       No current facility-administered medications for this visit.        PFSH:     The following portions of the patient's history were reviewed and updated as appropriate: Allergies / Current Medications / Past Medical History / Surgical History / Social History / Family History    PROBLEM LIST   Patient Active Problem List   Diagnosis   • Adenocarcinoma of esophagus   • Adenomatous polyp of colon    • Anemia   • Duodenal ulcer   • Insomnia   • Malignant neoplasm of prostate   • RLS (restless legs syndrome)   • Thrombophlebitis of deep veins of lower extremity   • Ventral hernia without obstruction or gangrene   • Incisional hernia, without obstruction or gangrene   • Hernia, incisional   • Osteoarthrosis, localized, primary, knee   • BPH (benign prostatic hyperplasia)   • Stasis dermatitis   • Medicare annual wellness visit, subsequent   • Reactive depression   • DVT (deep venous thrombosis)   • Hammer toes of both feet   • Other hammer toe(s) (acquired), left foot   • Status post left foot surgery   • Surgery follow-up-Left second third and fourth distal interphalangeal joint resection with flexor tenotomy - Left   • Other hyperlipidemia   • Primary osteoarthritis of right knee   • OA (osteoarthritis) of knee   • Hypertension   • Anti-phospholipid syndrome       PAST MEDICAL HISTORY  Past Medical History:   Diagnosis Date   • Anemia    • Anti-phospholipid syndrome     On lifelong anticoagulation therapy   • Arthritis     RIGHT KNEE   • Bladder disorder     LEAKAGE   ON MED  wers pads   • Community acquired pneumonia     HISTORY OF IN 2014   • Deep venous thrombosis 2006, 2008    Left lower extremity multiple   • Depression    • Esophageal carcinoma 12/31/2014    had chemo and radiation prior surgery   • Fatigue    • Hammer toe of left foot     had surgery   • Hemorrhoids    • HH (hiatus hernia)    • History of atrial fibrillation 2015    ONE EPISODE WHILE HOSPITALIZED   • History of kidney stones    • History of nephrolithiasis    • History of pancreatitis     PT STATES MANY YEARS AGO   • History of radiation therapy    • Hypertension    • Long-term (current) use of anticoagulants, INR goal 2.0-3.0    • Malignant neoplasm of prostate    • Restless legs syndrome    • Squamous carcinoma     on the head   • Stasis dermatitis    • Warfarin anticoagulation        SURGICAL HISTORY  Past Surgical History:    Procedure Laterality Date   • APPENDECTOMY  1950   • BRONCHOSCOPY      (Diagnostic)   • CATARACT EXTRACTION Bilateral 2014   • COLONOSCOPY  12/15/2014    Complete / Description: EH, IH, torts, stool, follow-up colonoscopy due in 5 years.   • COLONOSCOPY N/A 6/13/2017    Procedure: COLONOSCOPY TO CECUM AND INTO TERMINAL ILEUM;  Surgeon: Mulugeta BLACKWELL MD;  Location: [facility] ENDOSCOPY;  Service:    • CYSTOSCOPY W/ LASER LITHOTRIPSY     • ENDOSCOPY N/A 6/13/2017    Procedure: ESOPHAGOGASTRODUODENOSCOPY WITH COLD BIOPSY;  Surgeon: Lake Gonzalez MD;  Location: [facility] ENDOSCOPY;  Service:    • ESOPHAGECTOMY      April 2015, stage IIB esophageal carcinoma, sub-total resection.   • ESOPHAGECTOMY      Esophagectomy Subtotal [place] Joe Procedure   • EXCISION LESION  08/2012    Removal of Squamous Cell CA on Head   • HAMMER TOE REPAIR  09/2014    Hammertoe Operation (Each Toe), 10/2014   • HAMMER TOE REPAIR Left 10/3/2017    Procedure: Left second third and fourth distal interphalangeal joint resection with flexor tenotomy;  Surgeon: Mulugeta Lira MD;  Location: [facility] OR OSC;  Service:    • HERNIA REPAIR      incisional   • JEJUNOSTOMY      Laparoscopic   • JEJUNOSTOMY      tube removal    • KNEE SURGERY Bilateral 1967, 1973, 1981   • KNEE SURGERY Right 03/26/2018    TKR   • PATELLA SURGERY Left     removed   • PILONIDAL CYST / SINUS EXCISION     • [place] TOTAL KNEE ARTHROPLASTY Right 3/26/2018    Procedure: TOTAL KNEE ARTHROPLASTY;  Surgeon: Renny Solis MD;  Location: [facility] MAIN OR;  Service: Orthopedics   • PROSTATECTOMY  2010   • PYLOROPLASTY     • SPINAL FUSION  02/1998    C 5,6   • TONSILLECTOMY  1955   • TONSILLECTOMY     • UPPER GASTROINTESTINAL ENDOSCOPY  12/15/2014    LA Grade D esophagitis, Pardo's, HH, multiple duodenal ulcers   • UPPER GASTROINTESTINAL ENDOSCOPY      (Therapeutic)   • VENTRAL/INCISIONAL HERNIA REPAIR N/A 4/14/2016    Procedure: VENTRAL/INCISIONAL HERNIA REPAIR, open, with  mesh, and component separation;  Surgeon: Darren Rivas MD;  Location: [facility] MAIN OR;  Service:        SOCIAL HISTORY  Social History     Social History   • Marital status:      Spouse name: Lena   • Number of children: 0   • Years of education: College     Occupational History   •  Self-Employed   •  Retired     Social History Main Topics   • Smoking status: Former Smoker     Packs/day: 2.00     Years: 2.00     Types: Cigarettes     Quit date: 1974   • Smokeless tobacco: Never Used   • Alcohol use 1.8 oz/week     1 Glasses of wine, 1 Cans of beer, 1 Shots of liquor per week   • Drug use: No   • Sexual activity: Defer     Other Topics Concern   • Not on file     Social History Narrative    self-employed        FAMILY HISTORY  Family History   Problem Relation Age of Onset   • Cerebral aneurysm Mother      cerebral artery aneurysm   • Prostate cancer Brother 68   • Anxiety disorder Father    • Malig Hyperthermia Neg Hx          **Dragon Disclaimer:   Much of this encounter note is an electronic transcription/translation of spoken language to printed text. The electronic translation of spoken language may permit erroneous, or at times, nonsensical words or phrases to be inadvertently transcribed. Although I have reviewed the note for such errors, some may still exist.

## 2018-05-11 ENCOUNTER — TREATMENT (OUTPATIENT)
Dept: PHYSICAL THERAPY | Facility: CLINIC | Age: 68
End: 2018-05-11

## 2018-05-11 DIAGNOSIS — R26.9 ABNORMAL GAIT: ICD-10-CM

## 2018-05-11 DIAGNOSIS — Z96.651 TOTAL KNEE REPLACEMENT STATUS, RIGHT: Primary | ICD-10-CM

## 2018-05-11 PROCEDURE — 97140 MANUAL THERAPY 1/> REGIONS: CPT | Performed by: PHYSICAL THERAPIST

## 2018-05-11 PROCEDURE — 97110 THERAPEUTIC EXERCISES: CPT | Performed by: PHYSICAL THERAPIST

## 2018-05-11 PROCEDURE — 97530 THERAPEUTIC ACTIVITIES: CPT | Performed by: PHYSICAL THERAPIST

## 2018-05-11 NOTE — PROGRESS NOTES
Re-Assessment / Re-Certification        Patient: Jose Hurtado   : 1950  Diagnosis/ICD-10 Code:  Total knee replacement status, right [Z96.651]  Referring practitioner: Renny Solis MD  Date of Initial Visit: 2018  Today's Date: 2018  Patient seen for 14 sessions      Subjective:   Jose Hurtado reports: Date of surgery: 3/26/2018 (patient is ~ 6.5 weeks post op). Reports that he is feeling well. He continues to have trouble walking and uses his cane whenever he is not at home. He does feel that his balance and pain levels have improved quite a bit during the past month in therapy.     Pain level 3/10    Clinical Progress: improved  Home Program Compliance: Yes  Treatment has included: therapeutic exercise, neuromuscular re-education and manual therapy    Subjective   Objective       Ambulation     Observational Gait   Gait: asymmetric   Decreased walking speed, stride length, right stance time, left swing time and left step length.     Additional Observational Gait Details  Patient has significant deficits into R knee extension during gait.        Strength  R knee     Flexion   4+5  Extension  4+/5  Hip flexion 4/5    L knee  Flexion  5/5  Extension   5/5   Hip flexion 4/5  DF 5/5    Patient has notable functional strength deficits in hip extension and abduction during TE/ TA.     Active Range of Motion      Right Knee   Flexion: 105 degrees   Extension: Right knee active extension: -6.      Passive Range of Motion      Right Knee   Flexion: 108 degrees   Extension: Right knee passive extension: -3.         Assessment/Plan   Patient tolerated all treatment well on this date. He has had good progress over the past month in terms of his pain levels, subjective activity tolerance and reduced limitations with ADL's. He continues to have significant gait deficits primarily due to a lack of R knee extension during stance phase of RLE. Patient continues to use AD for gait when not in his home and does  continue to have increased fall risk due to gait deficits. He continues to need skilled therapy to promote improved gait pattern, safety, ADL ability, activity tolerance, and functional mobility.     Progress toward previous goals: Partially Met     Goals    Short Term Goals (2 weeks)  1. Pt to exhibit 5-100 degrees of knee AROM to allow for improved gait and stair climbing  MET  2. Pt to report less pain with ADL's  MET  3. Pt able to ambulate household and clinic distances with straight cane and good heel strike  MET    Long Term Goals: (4 weeks)  1. Pt to exhibit 0-120 degrees  Knee AROM to allow for traversing curbs and ascending./ descending stairs  PROGRESSING   2. Pt to report min to no pain with stair climbing    PROGRESSING  3. Pt to score >50/80 on LEFS   PROGRESSING    Remaining goals appropriate at this time.  New goals  (1 month)   1. Patient will demonstrate improved gait speed, foot clearance, and equal stride length during flat ground ambulation.   2. Patient will tolerate > 45 min yard work without limitations   3. Patient will demonstrate minimal extension deficit with flat ground ambulation.         Recommendations: Continue as planned and increase priority on gait based functional exercises.   Timeframe: 1 month    Prognosis to achieve goals: good    PT Signature: Gonzalo Solis, PT T  [place] License # [id number]      Based upon review of the patient's progress and continued therapy plan, it is my medical opinion that Jose Hurtado should continue physical therapy treatment at [facility] PH[facility] TH[facility]  [address] [place] [id number] [phone].    Signature: __________________________________  Renny Solis MD    Manual Therapy:    8     mins  09556;  Therapeutic Exercise:    16     mins  97572;     Neuromuscular Wil:        mins  94024;    Therapeutic Activity:     26     mins  46910;     Gait Training:           mins  46542;     Ultrasound:           mins  48538;    Electrical Stimulation:         mins  01125 ( );  Dry Needling          mins self-pay    Timed Treatment:   40   mins (one on one)    Total Treatment:     60   mins    (Additional time past 40 in was not one on one with patient and will not be billed)

## 2018-05-14 ENCOUNTER — TELEPHONE (OUTPATIENT)
Dept: INTERNAL MEDICINE | Age: 68
End: 2018-05-14

## 2018-05-14 ENCOUNTER — OFFICE VISIT (OUTPATIENT)
Dept: PHYSICAL THERAPY | Facility: CLINIC | Age: 68
End: 2018-05-14

## 2018-05-14 DIAGNOSIS — Z96.651 TOTAL KNEE REPLACEMENT STATUS, RIGHT: Primary | ICD-10-CM

## 2018-05-14 DIAGNOSIS — R26.9 ABNORMAL GAIT: ICD-10-CM

## 2018-05-14 PROCEDURE — 97140 MANUAL THERAPY 1/> REGIONS: CPT | Performed by: PHYSICAL THERAPIST

## 2018-05-14 PROCEDURE — 97110 THERAPEUTIC EXERCISES: CPT | Performed by: PHYSICAL THERAPIST

## 2018-05-14 NOTE — PROGRESS NOTES
"Physical Therapy Daily Progress Note    Time In 0920  Time Out 1023    Jose Hurtado reports: he has been working really hard on ROM at home.  Unsure if he can walk with legs totally straight as he has always been told he \"walked like Darian Ramos\".    Subjective     Objective       Active Range of Motion   Left Knee   Flexion: 110 (116 AAROM) degrees   Extension: 5 (lacks 5 deg) degrees     Passive Range of Motion   Left Knee   Flexion: 120 degrees   Extension: 0 degrees      See Exercise, Manual, and Modality Logs for complete treatment.   Utilized contract/relax technique with passive left knee stretching    Advised patient to check with MD regarding his home performance of quadraped knee flexion stretch.    Assessment/Plan  Functional active and active assisted ROM in flexion and extensions plane, though patient presents with decreased awareness of trunk posture and knee positioning with ambulation activities.  Benefits from verbal/tactile cues to encourage proper posture and gait. Compliant/cooperative and motivated with rehab efforts.    Other  Continue with manual efforts to increase Left Knee ROM.  Progress strengthening and balance activities for affected musculature.           Manual Therapy:  11      mins  48246;  Therapeutic Exercise:     28    mins  43764;     Neuromuscular Wil:        mins  19257;    Therapeutic Activity:     5    mins  42379;     Gait Training:          mins  82478;     Ultrasound:      mins  54261;    Electrical Stimulation:      mins  37461 ( );      Timed Treatment: 44   mins   Total Treatment:     63  mins    Rey Sterling PTA    Physical Therapist Assistant [place] [id number]    "

## 2018-05-16 ENCOUNTER — OFFICE VISIT (OUTPATIENT)
Dept: PHYSICAL THERAPY | Facility: CLINIC | Age: 68
End: 2018-05-16

## 2018-05-16 DIAGNOSIS — Z96.651 TOTAL KNEE REPLACEMENT STATUS, RIGHT: Primary | ICD-10-CM

## 2018-05-16 PROCEDURE — 97140 MANUAL THERAPY 1/> REGIONS: CPT | Performed by: PHYSICAL THERAPIST

## 2018-05-16 PROCEDURE — 97110 THERAPEUTIC EXERCISES: CPT | Performed by: PHYSICAL THERAPIST

## 2018-05-20 NOTE — PROGRESS NOTES
Physical Therapy Daily Progress Note    Time In 09  Time Out  1020    Jose Hurtado reports: I am going without the cane to force myself to walk better.    Subjective     Objective       Observations     Additional Observation Details  Presents without assistive device.  Walking unaided with increased focus on posture and gait(knees straight, upright posture)     See Exercise, Manual, and Modality Logs for complete treatment.     Utilized full length mirror in clinic with gait/ambulation training on the walking track for visual cuing.  Up and down waking track x 6 reps(60 foot track).      Assessment/Plan  Visual feedback assists with ensuring/aiding proper gait as well as increased awareness of posture.  Improved step length and control with 4 way cable walk activities.    Progress strengthening /stabilization /functional activity for Right knee.  To MD on 18           Manual Therapy:    11    mins  32926;  Therapeutic Exercise:    25    mins  37633;     Neuromuscular Wil:        mins  36875;    Therapeutic Activity:   5    mins  75319;     Gait Trainin    mins  43504;     Ultrasound:          mins  81432;    Electrical Stimulation:      mins  15090 ( );      Timed Treatment: 46  Mins  Total Treatment  60  mins    Rey Sterling PTA    Physical Therapist Assistant [place] [address]

## 2018-05-21 ENCOUNTER — OFFICE VISIT (OUTPATIENT)
Dept: SURGERY | Facility: CLINIC | Age: 68
End: 2018-05-21

## 2018-05-21 ENCOUNTER — OFFICE VISIT (OUTPATIENT)
Dept: PHYSICAL THERAPY | Facility: CLINIC | Age: 68
End: 2018-05-21

## 2018-05-21 VITALS
SYSTOLIC BLOOD PRESSURE: 120 MMHG | OXYGEN SATURATION: 98 % | WEIGHT: 240 LBS | BODY MASS INDEX: 28.34 KG/M2 | HEART RATE: 88 BPM | DIASTOLIC BLOOD PRESSURE: 72 MMHG | HEIGHT: 77 IN

## 2018-05-21 DIAGNOSIS — C15.9 ADENOCARCINOMA OF ESOPHAGUS (HCC): Primary | ICD-10-CM

## 2018-05-21 DIAGNOSIS — Z96.651 TOTAL KNEE REPLACEMENT STATUS, RIGHT: Primary | ICD-10-CM

## 2018-05-21 PROCEDURE — 97140 MANUAL THERAPY 1/> REGIONS: CPT | Performed by: PHYSICAL THERAPIST

## 2018-05-21 PROCEDURE — 99213 OFFICE O/P EST LOW 20 MIN: CPT | Performed by: THORACIC SURGERY (CARDIOTHORACIC VASCULAR SURGERY)

## 2018-05-21 PROCEDURE — 97110 THERAPEUTIC EXERCISES: CPT | Performed by: PHYSICAL THERAPIST

## 2018-05-21 PROCEDURE — 97530 THERAPEUTIC ACTIVITIES: CPT | Performed by: PHYSICAL THERAPIST

## 2018-05-21 NOTE — PROGRESS NOTES
Subjective   Patient ID: Jose Hurtado is a 68 y.o. male is here today for follow-up.    History of Present Illness  Dear Colleague,  Jose Hurtado was seen in our office today for continued follow up of an ivory Joe esophagogastrectomy performed April 24, 2015 for a T2 N0 M0 adenocarcinoma of the esophagus.  Pathology following neoadjuvant radiation chemotherapy and resection showed no residual cancer.  The patient has been doing very well.  He is able to eat and swallow without difficulty.  There has been no dysphasia or painful swallowing.  He is gaining weight.  He is also followed by Dr. Phelan of the [facility] oncology group.  Recent CT scans showed no evidence of recurrence.    The following portions of the patient's history were reviewed and updated as appropriate: allergies, current medications, past family history, past medical history, past social history, past surgical history and problem list.  Review of Systems   Constitution: Negative.   HENT: Negative.    Eyes: Negative.    Cardiovascular: Negative.    Respiratory: Negative.    Endocrine: Negative.    Hematologic/Lymphatic: Negative.    Skin: Negative.    Musculoskeletal: Negative.    Gastrointestinal: Negative.    Genitourinary: Negative.    Neurological: Negative.    Psychiatric/Behavioral: Negative.      Patient Active Problem List   Diagnosis   • Adenocarcinoma of esophagus   • Adenomatous polyp of colon   • Anemia   • Duodenal ulcer   • Insomnia   • Malignant neoplasm of prostate   • RLS (restless legs syndrome)   • Thrombophlebitis of deep veins of lower extremity   • Ventral hernia without obstruction or gangrene   • Incisional hernia, without obstruction or gangrene   • Hernia, incisional   • Osteoarthrosis, localized, primary, knee   • BPH (benign prostatic hyperplasia)   • Stasis dermatitis   • Medicare annual wellness visit, subsequent   • Reactive depression   • DVT (deep venous thrombosis)   • Hammer toes of both feet   • Other hammer toe(s)  (acquired), left foot   • Status post left foot surgery   • Surgery follow-up-Left second third and fourth distal interphalangeal joint resection with flexor tenotomy - Left   • Other hyperlipidemia   • Primary osteoarthritis of right knee   • OA (osteoarthritis) of knee   • Hypertension   • Anti-phospholipid syndrome     Past Medical History:   Diagnosis Date   • Anemia    • Anti-phospholipid syndrome     On lifelong anticoagulation therapy   • Arthritis     RIGHT KNEE   • Bladder disorder     LEAKAGE   ON MED  wers pads   • Community acquired pneumonia     HISTORY OF IN 2014   • Deep venous thrombosis 2006, 2008    Left lower extremity multiple   • Depression    • Esophageal carcinoma 12/31/2014    had chemo and radiation prior surgery   • Fatigue    • Hammer toe of left foot     had surgery   • Hemorrhoids    • HH (hiatus hernia)    • History of atrial fibrillation 2015    ONE EPISODE WHILE HOSPITALIZED   • History of kidney stones    • History of nephrolithiasis    • History of pancreatitis     PT STATES MANY YEARS AGO   • History of radiation therapy    • Hypertension    • Long-term (current) use of anticoagulants, INR goal 2.0-3.0    • Malignant neoplasm of prostate    • Restless legs syndrome    • Squamous 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   • COLONOSCOPY N/A 6/13/2017    Procedure: COLONOSCOPY TO CECUM AND INTO TERMINAL ILEUM;  Surgeon: Mulugeta BLACKWELL MD;  Location: [facility] ENDOSCOPY;  Service:    • CYSTOSCOPY W/ LASER LITHOTRIPSY     • ENDOSCOPY N/A 6/13/2017    Procedure: ESOPHAGOGASTRODUODENOSCOPY WITH COLD BIOPSY;  Surgeon: Lake Gonzalez MD;  Location: [facility] ENDOSCOPY;  Service:    •  ESOPHAGECTOMY      April 2015, stage IIB esophageal carcinoma, sub-total resection.   • ESOPHAGECTOMY      Esophagectomy Subtotal Andrea Joe Procedure   • EXCISION LESION  08/2012    Removal of Squamous Cell CA on Head   • HAMMER TOE REPAIR  09/2014    Hammertoe Operation (Each Toe), 10/2014   • HAMMER TOE REPAIR Left 10/3/2017    Procedure: Left second third and fourth distal interphalangeal joint resection with flexor tenotomy;  Surgeon: Mulugeta Lira MD;  Location: [facility];  Service:    • HERNIA REPAIR      incisional   • JEJUNOSTOMY      Laparoscopic   • JEJUNOSTOMY      tube removal    • KNEE SURGERY Bilateral 1967, 1973, 1981   • KNEE SURGERY Right 03/26/2018    TKR   • PATELLA SURGERY Left     removed   • PILONIDAL CYST / SINUS EXCISION     • CA TOTAL KNEE ARTHROPLASTY Right 3/26/2018    Procedure: TOTAL KNEE ARTHROPLASTY;  Surgeon: Renny Solis MD;  Location: [facility];  Service: Orthopedics   • PROSTATECTOMY  2010   • PYLOROPLASTY     • SPINAL FUSION  02/1998    C 5,6   • TONSILLECTOMY  1955   • TONSILLECTOMY     • UPPER GASTROINTESTINAL ENDOSCOPY  12/15/2014    LA Grade D esophagitis, Pardo's, HH, multiple duodenal ulcers   • UPPER GASTROINTESTINAL ENDOSCOPY      (Therapeutic)   • VENTRAL/INCISIONAL HERNIA REPAIR N/A 4/14/2016    Procedure: VENTRAL/INCISIONAL HERNIA REPAIR, open, with mesh, and component separation;  Surgeon: Darren Rivas MD;  Location: [facility];  Service:      Family History   Problem Relation Age of Onset   • Cerebral aneurysm Mother         cerebral artery aneurysm   • Prostate cancer Brother 68   • Anxiety disorder Father    • Malig Hyperthermia Neg Hx      Social History     Social History   • Marital status:      Spouse name: Lena   • Number of children: 0   • Years of education: College     Occupational History   •  Self-Employed   •  Retired     Social History Main Topics   • Smoking status: Former Smoker     Packs/day:  2.00     Years: 2.00     Types: Cigarettes     Quit date: 1974   • Smokeless tobacco: Never Used   • Alcohol use 1.8 oz/week     1 Glasses of wine, 1 Cans of beer, 1 Shots of liquor per week   • Drug use: No   • Sexual activity: Defer     Other Topics Concern   • Not on file     Social History Narrative    self-employed        Current Outpatient Prescriptions:   •  HYDROcodone-acetaminophen (NORCO) 7.5-325 MG per tablet, Take 1 tablet by mouth Every 4 (Four) Hours As Needed for Moderate Pain ., Disp: 60 tablet, Rfl: 0  •  PARoxetine (PAXIL) 30 MG tablet, Take 1 tablet by mouth Every Night., Disp: 90 tablet, Rfl: 1  •  pramipexole (MIRAPEX) 1.5 MG tablet, Take 1.5 mg by mouth Daily., Disp: , Rfl:   •  warfarin (COUMADIN) 5 MG tablet, Take 5 mg by mouth Daily. Pt to stop 6 days before surgery & bridge with lovenox, Disp: , Rfl:   No Known Allergies     Objective   Vitals:    05/21/18 1335   BP: 120/72   Pulse: 88   SpO2: 98%     Physical Exam   Constitutional: He is oriented to person, place, and time. He appears well-developed and well-nourished.   HENT:   Head: Normocephalic.   Eyes: Conjunctivae, EOM and lids are normal. Pupils are equal, round, and reactive to light.   Neck: Trachea normal and normal range of motion. Neck supple. No hepatojugular reflux and no JVD present. Carotid bruit is not present. No thyroid mass and no thyromegaly present.   Cardiovascular: Normal rate, regular rhythm, S1 normal, S2 normal, normal heart sounds and normal pulses.   No extrasystoles are present. PMI is not displaced.    Pulmonary/Chest: Effort normal and breath sounds normal.   Abdominal: Soft. Normal appearance and bowel sounds are normal. He exhibits no mass. There is no hepatosplenomegaly. There is no tenderness. No hernia.   Musculoskeletal: Normal range of motion.   Neurological: He is alert and oriented to person, place, and time. He has normal strength and normal reflexes. No cranial nerve deficit or sensory  deficit. He displays a negative Romberg sign.   Skin: Skin is warm, dry and intact.   Psychiatric: He has a normal mood and affect. His speech is normal and behavior is normal. Judgment and thought content normal. Cognition and memory are normal.     Independent Review of Radiographic Studies:    CT scan of the chest abdomen and pelvis performed March 2, 2018 was independently reviewed.CHEST: The gastric pull-through appears unremarkable. There is no  mediastinal or hilar lymphadenopathy. The very small loculated right  pleural effusion and the scarring at the right lower lobe and right  middle lobe appear unchanged. There are no new pulmonary opacities.     ABDOMEN/PELVIS: The liver, gallbladder, spleen, pancreas, adrenals, and  kidneys appear unremarkable other than a few nonobstructing stones  within the kidneys. No acute bowel abnormality is seen. The appendix is  surgically absent. There is no free fluid or lymphadenopathy.    Assessment/Plan       The patient is now 3 years out from his primary treatment.  He is showing no evidence of recurrent cancer.  I will see him back in the office in one year with CT of the chest abdomen and pelvis for further follow-up.  I will be glad to see him sooner if you feel it's necessary.  Thank you for allowing us to participate in the care Mr. Hurtado.    Diagnoses and all orders for this visit:    Adenocarcinoma of esophagus

## 2018-05-21 NOTE — PROGRESS NOTES
Physical Therapy Progress Note    Time In 0930  Time Out 1030      2018  Renny Solis MD    Re: Jose Hurtado  ________________________________________________________________    Mr. Jose Hurtado, has attended 17/18 times for PT to right knee.  Treatment has consisted of: therapeutic exercise, manual techniques to increase ROM and gait training as well as patient education.     S: Mr. Jose Hurtado states:right knee feels better since starting PT/last seeing MD.  States that he is experiencing less pain in his right knee, that is moves a little better and feels stronger.  Notes that his right knee doesn't move as good as he thinks it should and that he still experiences pain during exercise/activity.  Knee gets stiff quickly after sitting for period of time and makes getting up and moving difficult.    Subjective Evaluation    Pain  Current pain rating: 3  At best pain ratin  At worst pain ratin (first getting up and moving after having sat for awhile.)  Quality: dull ache  Relieving factors: change in position, rest and support  Aggravating factors: prolonged positioning           Objective       Active Range of Motion     Right Knee   Flexion: 120 degrees   Extension: 2 (lacking 2 degrees from 0) degrees     Passive Range of Motion     Right Knee   Extension: 0 degrees     Strength/Myotome Testing     Right Knee   Flexion: 4+  Extension: 4+  Quadriceps contraction: good     See Exercise, Manual, and Modality Logs for complete treatment.   Performed 8 mins on recumbent bike for warm up and ROM right knee(full revolutions seat 14)    Added elliptical walker x 3 min    Ambulated up/down 1 flight of steps in the stairwell with reciprocal gait x 4.  Ambulated 500 ft without assistive device and concentrated effort on posture/arm swing x 5 min      Assessment/Plan  Complaint/cooperative/motivated with rehab efforts.  Subjectively, he reports working hard on exercises at home and feels his knee is  moving better, though it stiffens up quickly if he is in one position for any period of time(ie. Sitting, R knee in extreme flexion or ext range).  Objectively he presents with improved right knee ROM and mm strength with subsequent decrease in edema. Performing home exercise program for affected musculature and exhibits good compliance/recall/performance.  He is progressing well towards all LTG's(met LTG #1, #3) in an effort to return him to previous functional activity level.  Feel he would benefit from a continuation of PT services to further resolve his right LE gait issues.  Please advise after your exam!    Other  To MD 18 with letter and recommendations. Await further orders if continued PT intervention indicated.           Manual Therapy:  15    mins  32447;  Therapeutic Exercise:     8    mins  38037;     Neuromuscular Wil:        mins  61693;    Therapeutic Activity:  18     mins  10048;     Gait Trainin     mins  72428;     Ultrasound:          mins  25882;    Electrical Stimulation:         mins  11755 ( );      Timed Treatment:   45   mins   Total Treatment:   60   mins    Rey Sterling PTA,   Physical Therapist Assistant # [id number]

## 2018-05-22 ENCOUNTER — OFFICE VISIT (OUTPATIENT)
Dept: ORTHOPEDIC SURGERY | Facility: CLINIC | Age: 68
End: 2018-05-22

## 2018-05-22 VITALS — BODY MASS INDEX: 32.37 KG/M2 | TEMPERATURE: 98.7 F | WEIGHT: 239 LBS | HEIGHT: 72 IN

## 2018-05-22 DIAGNOSIS — Z98.890 S/P KNEE SURGERY: Primary | ICD-10-CM

## 2018-05-22 PROCEDURE — 73562 X-RAY EXAM OF KNEE 3: CPT | Performed by: NURSE PRACTITIONER

## 2018-05-22 PROCEDURE — 99024 POSTOP FOLLOW-UP VISIT: CPT | Performed by: NURSE PRACTITIONER

## 2018-05-22 NOTE — PROGRESS NOTES
Jose Hurtado : 1950 MRN: [id number] DATE: 2018    DIAGNOSIS: 8 week follow up right total knee      SUBJECTIVE:Patient returns today for 8 week follow up of right total knee replacement. Patient reports doing well with no unusual complaints. Appears to be progressing appropriately.    OBJECTIVE:   Exam:. The incision is well healed. No sign of infection. Range of motion is measured at . The calf is soft and nontender with a negative Homans sign. Strength is progressing and the patient is ambulating appropriately.    DIAGNOSTIC STUDIES  Xrays: 3 views of the right knee (AP, lateral, and sunrise) were ordered and reviewed for evaluation of recent knee replacement. They demonstrate a well positioned, well aligned knee replacement without complicating factors noted. In comparison with previous films there has been no change.    ASSESSMENT: 8 week status post right knee replacement.    PLAN: 1) Continue with PT exercises as prescribed   2) Follow up in 6 months    Luba Osuna, APRN  2018

## 2018-05-23 ENCOUNTER — OFFICE VISIT (OUTPATIENT)
Dept: PHYSICAL THERAPY | Facility: CLINIC | Age: 68
End: 2018-05-23

## 2018-05-23 DIAGNOSIS — Z96.651 TOTAL KNEE REPLACEMENT STATUS, RIGHT: Primary | ICD-10-CM

## 2018-05-23 PROCEDURE — 97530 THERAPEUTIC ACTIVITIES: CPT | Performed by: PHYSICAL THERAPIST

## 2018-05-23 PROCEDURE — 97110 THERAPEUTIC EXERCISES: CPT | Performed by: PHYSICAL THERAPIST

## 2018-05-23 NOTE — PROGRESS NOTES
Physical Therapy Daily Progress Note    Time In 0930 Time Out 1015    Jose Hurtado reports: knee still feels stiff/tight but it is bending better.    Subjective     Objective   See Exercise, Manual, and Modality Logs for complete treatment.       Assessment/Plan  Noted improved knee flexion/extension with exercises, especially with leg press activities.  Limited exercise/activity performance this session due to other medical appointments.  Able to perform elliptical machine activities with increased knee symptoms.  Performs with improved hip and knee movement with subsequent improvement noted with ambulation.    Progress strengthening /stabilization /functional activity           Manual Therapy:       mins  18335;  Therapeutic Exercise:   15    mins  64030;     Neuromuscular Wil:        mins  89798;    Therapeutic Activity:   15     mins  17409;     Gait Training:         mins  84358;     Ultrasound:          mins  85853;    Electrical Stimulation:         mins  68506 ( );      Timed Treatment:   30   mins   Total Treatment:   45     mins    Rey Sterling PTA    Physical Therapist Assistant [place] [id number]

## 2018-05-25 ENCOUNTER — OFFICE VISIT (OUTPATIENT)
Dept: PHYSICAL THERAPY | Facility: CLINIC | Age: 68
End: 2018-05-25

## 2018-05-25 DIAGNOSIS — Z96.651 TOTAL KNEE REPLACEMENT STATUS, RIGHT: Primary | ICD-10-CM

## 2018-05-25 PROCEDURE — 97110 THERAPEUTIC EXERCISES: CPT | Performed by: PHYSICAL THERAPIST

## 2018-05-25 PROCEDURE — 97140 MANUAL THERAPY 1/> REGIONS: CPT | Performed by: PHYSICAL THERAPIST

## 2018-05-29 ENCOUNTER — TELEPHONE (OUTPATIENT)
Dept: INTERNAL MEDICINE | Age: 68
End: 2018-05-29

## 2018-05-29 NOTE — TELEPHONE ENCOUNTER
Sir, this is the updated dose from 2 wks ago.  Coumadin 2.5 mg per day ×5 days, 5 mg ×2 days, recheck INR 2 weeks.

## 2018-05-29 NOTE — TELEPHONE ENCOUNTER
[facility] [place] the patient checked his INR on 5/26/18 and got 1.4.

## 2018-05-30 ENCOUNTER — OFFICE VISIT (OUTPATIENT)
Dept: PHYSICAL THERAPY | Facility: CLINIC | Age: 68
End: 2018-05-30

## 2018-05-30 DIAGNOSIS — Z96.651 TOTAL KNEE REPLACEMENT STATUS, RIGHT: Primary | ICD-10-CM

## 2018-05-30 PROCEDURE — 97530 THERAPEUTIC ACTIVITIES: CPT | Performed by: PHYSICAL THERAPIST

## 2018-05-30 PROCEDURE — 97110 THERAPEUTIC EXERCISES: CPT | Performed by: PHYSICAL THERAPIST

## 2018-05-30 NOTE — PROGRESS NOTES
Physical Therapy Daily Progress Note    Time In 0835  Time Out 0925    Jose Hurtado reports: that he is paying attention to how he is standing and walking more especially when coming from a seated position.    Subjective     Objective       Observations     Additional Observation Details  Noted left LE swelling into distal calf/ankle.  Pt states he has past history of DVT and some days will have more swelling than others.  Discussed importance of wearing compression stockings daily B LE's    Ambulates without assistive device in/out of clinic.  Gait remains mildly antalgic with noted increased knee flexion     Active Range of Motion     Right Knee   Flexion: 124 (active assisted) degrees   Extension: 2 (lacking 2 deg of full extension) degrees      See Exercise, Manual, and Modality Logs for complete treatment.   Increased time with elliptical    Assessment/Plan  Exhibits increased active assisted ROM right knee versus last measurement.  Continuing to progress well toward all goals.  Gait is improving with noted increased ambulation without assistive device. Pt remains motivated and determined with rehab efforts in regards to pushing self for maximum strength gains.    Progress per Plan of Care toward all goals.           Manual Therapy:       mins  03042;  Therapeutic Exercise:    30     mins  28691;     Neuromuscular Wil:        mins  28057;    Therapeutic Activity:     15     mins  14768;     Gait Training:           mins  15535;     Ultrasound:        mins  74181;    Electrical Stimulation:       mins  87785 ( );      Timed Treatment:   45   mins   Total Treatment:    55    mins    Rey Sterling PTA    Physical Therapist Assistant [place] [id number]

## 2018-05-31 NOTE — PROGRESS NOTES
Physical Therapy Daily Progress Note    Time In 0910  Time Out 1010    Jose Estevezut reports: right knee seems like it is moving better.    Subjective     Objective       Passive Range of Motion     Right Knee   Flexion: 127 degrees   Extension: 2 (lacking 2 deg from 0) degrees      See Exercise, Manual, and Modality Logs for complete treatment.   Increased time on elliptical machine  Added TKE and rev TKE on cable machine      Assessment/Plan  PROM Right LE improving with less pain.  Pt right knee still exhibits some swelling but noted increased pain free capability in regards to flexion and extension movement.  Continues to challenge self with cable machine activities and is noted to perform with improved balance and control of activities without right knee symptoms/discomfort reported.    Progress strengthening /stabilization /functional activity for affected musculature.           Manual Therapy:   10      mins  15510;  Therapeutic Exercise:    30     mins  24842;     Neuromuscular Wil:      mins  97596;    Therapeutic Activity:    10    mins  34161;     Gait Training:         mins  97408;     Ultrasound:       mins  04890;    Electrical Stimulation:       mins  19242 ( );      Timed Treatment:  50   mins   Total Treatment:  60  mins    Rey Sterling PTA    Physical Therapist Assistant [place] [facility]

## 2018-06-01 ENCOUNTER — OFFICE VISIT (OUTPATIENT)
Dept: PHYSICAL THERAPY | Facility: CLINIC | Age: 68
End: 2018-06-01

## 2018-06-01 DIAGNOSIS — Z96.651 TOTAL KNEE REPLACEMENT STATUS, RIGHT: Primary | ICD-10-CM

## 2018-06-01 PROCEDURE — 97530 THERAPEUTIC ACTIVITIES: CPT | Performed by: PHYSICAL THERAPIST

## 2018-06-01 PROCEDURE — 97140 MANUAL THERAPY 1/> REGIONS: CPT | Performed by: PHYSICAL THERAPIST

## 2018-06-01 PROCEDURE — 97110 THERAPEUTIC EXERCISES: CPT | Performed by: PHYSICAL THERAPIST

## 2018-06-01 NOTE — PROGRESS NOTES
Physical Therapy Daily Progress Note    Time In 0830  Time Out 0945    Jose Hurtado reports: that he has trouble working in his garden and getting up without having to crawl to get support and pull himself up.    Subjective     Objective       Active Range of Motion     Right Knee   Flexion: 124 degrees     Passive Range of Motion     Right Knee   Flexion: 129 degrees      See Exercise, Manual, and Modality Logs for complete treatment.     Airbike x 10 min Warmup, endurance  Elliptical x 5 min fwd  Leg press Bilateral 140 lbs 3 x 10   TKE on cable machine R LE 3 x 10  Reverse TKE on cable machine R LE 3 x 10    Instructed/demonstrated and performed with patient on turf field how to get self up from quadraped position by bringing self into half kneeling position and then pushing with hands through  Left thigh and with foot of R LE..    Added: lunges with chair support to HEP. X 5 each LE    Issued blue and black t band for patient home performance and progression.    Assessment/Plan  Pt reporting increased functional activity performance at home(both inside and outside) and benefits from instruction/demonstration and performance of how to return to a standing position from quadraped.  Continues to improve and progress with strengthening exercise regimen.  PROM increased with measurement this session versus previously.    Progress strengthening /stabilization /functional activity as appropriate.           Manual Therapy: 10        mins  12519;  Therapeutic Exercise:    30    mins  00857;     Neuromuscular Wil:      mins  00965;    Therapeutic Activity:    15      mins  88825;     Gait Training:           mins  55680;     Ultrasound:          mins  16072;    Electrical Stimulation:         mins  07139 ( );      Timed Treatment:   55   mins   Total Treatment:   75    mins    Rey Sterling, PTA    Physical Therapist Assistant [place] [facility]

## 2018-06-05 ENCOUNTER — OFFICE VISIT (OUTPATIENT)
Dept: PHYSICAL THERAPY | Facility: CLINIC | Age: 68
End: 2018-06-05

## 2018-06-05 DIAGNOSIS — Z96.651 TOTAL KNEE REPLACEMENT STATUS, RIGHT: Primary | ICD-10-CM

## 2018-06-05 PROCEDURE — 97110 THERAPEUTIC EXERCISES: CPT | Performed by: PHYSICAL THERAPIST

## 2018-06-05 PROCEDURE — 97530 THERAPEUTIC ACTIVITIES: CPT | Performed by: PHYSICAL THERAPIST

## 2018-06-05 NOTE — PROGRESS NOTES
Physical Therapy Daily Progress Note    Time In 0830  Time Out 0925    Jose Hurtado reports: that he has been doing more outside lately getting down onto his knees, but that he still has difficulty getting up without having to crawl to something to pull self up.    Subjective     Objective   See Exercise, Manual, and Modality Logs for complete treatment.   Added glute bridges with green t band; sidelying clamshells with green band    Assessment/Plan  Continues to progress well with therapeutic exercise and activities without increased symptoms.  Still exhibits initial antalgia R LE with ambulation after coming from a prolonged position(sitting) and early fatigue with isolated resistive mm exercises.  Met LTG #2, #3.  Compliant, cooperative and motivated with rehab efforts nearing point of discharge.    Progress strengthening /stabilization /functional activity           Manual Therapy:         mins  48453;  Therapeutic Exercise:    35     mins  54277;     Neuromuscular Wil:        mins  06095;    Therapeutic Activity:    10      mins  57412;     Gait Training:         mins  72978;     Ultrasound:          mins  82849;    Electrical Stimulation:       mins  64708 ( );      Timed Treatment:  45   mins   Total Treatment:     55   mins    Rey Sterling PTA    Physical Therapist Assistant [place] [facility]45

## 2018-06-07 ENCOUNTER — OFFICE VISIT (OUTPATIENT)
Dept: PHYSICAL THERAPY | Facility: CLINIC | Age: 68
End: 2018-06-07

## 2018-06-07 DIAGNOSIS — Z96.651 TOTAL KNEE REPLACEMENT STATUS, RIGHT: Primary | ICD-10-CM

## 2018-06-07 PROCEDURE — 97110 THERAPEUTIC EXERCISES: CPT | Performed by: PHYSICAL THERAPIST

## 2018-06-07 PROCEDURE — 97530 THERAPEUTIC ACTIVITIES: CPT | Performed by: PHYSICAL THERAPIST

## 2018-06-07 NOTE — PROGRESS NOTES
Physical Therapy Daily Progress Note    Time In 0805 Time Out 0900    Jose Estevezut reports: soreness along both thighs from home activity and exercises    Subjective     Objective       Observations     Additional Observation Details  Ambulates with noted decreased dependence upon straight cane.  Ambulates with lessened though still noted knee flexion bilaterally, corrects with cuing(verbal/tactile)     See Exercise, Manual, and Modality Logs for complete treatment.   Added sumo deadlifts with 10 and 20 lb kettle bell  Discussed breaking up of project tasks at home(i.e. [place] duration, alternating positions, etc) to minimize knee/quad soreness as well as utilization of toolbox/bucket to assist with rising up from knees.  Discussed continued healing process in regards to right and associated time line.    Practiced with patient ability to bring self up from floor x 10 min:  Functional activity as patient indicates gardening and brick laying work around the house and difficulty getting self back up without having to crawl to grab something to pull self up.   Practiced coming up from quadraped to half kneeling to upright with both left and right LE bent x 3 attempts each LE bent.      Assessment/Plan  Increased activity at home(patio project) with subsequent increase in mm soreness bilateral quads.  Gait normalizing for patient, but still ambulates with increased knee flexion bilaterally.  Able to correct with verbal/tactile cues.  Progressing well with increased quad/squat activities and exhibits improved capability with rising from floor via half kneeling position.  Good overall progress to date with R knee replacement.    Other  Re eval per primary PT.  Update LEFS questionnaire           Manual Therapy:       mins  36918;  Therapeutic Exercise:    20     mins  59035;     Neuromuscular Wil:        mins  43509;    Therapeutic Activity:     15     mins  82138;     Gait Training:           mins  58823;      Ultrasound:          mins  07859;    Electrical Stimulation:       mins  51826 ( );      Timed Treatment:  35    mins   Total Treatment:    55    mins    Rey Sterling, PTA    Physical Therapist Assistant [place] [facility]

## 2018-06-11 ENCOUNTER — TREATMENT (OUTPATIENT)
Dept: PHYSICAL THERAPY | Facility: CLINIC | Age: 68
End: 2018-06-11

## 2018-06-11 DIAGNOSIS — Z96.651 TOTAL KNEE REPLACEMENT STATUS, RIGHT: Primary | ICD-10-CM

## 2018-06-11 PROCEDURE — 97140 MANUAL THERAPY 1/> REGIONS: CPT | Performed by: PHYSICAL THERAPIST

## 2018-06-11 PROCEDURE — G8978 MOBILITY CURRENT STATUS: HCPCS | Performed by: PHYSICAL THERAPIST

## 2018-06-11 PROCEDURE — 97116 GAIT TRAINING THERAPY: CPT | Performed by: PHYSICAL THERAPIST

## 2018-06-11 PROCEDURE — 97110 THERAPEUTIC EXERCISES: CPT | Performed by: PHYSICAL THERAPIST

## 2018-06-11 PROCEDURE — G8979 MOBILITY GOAL STATUS: HCPCS | Performed by: PHYSICAL THERAPIST

## 2018-06-11 NOTE — PROGRESS NOTES
Re-Assessment / Re-Certification      Patient: Jose Hurtado   : 1950  Diagnosis/ICD-10 Code:  Total knee replacement status, right [Z96.651]  Referring practitioner: Renny Solis MD  Date of Initial Visit: 2018  Today's Date: 2018  Patient seen for 24 sessions      Subjective:   Jose Hurtado reports: I still can't get up from floor wihtout holding on to something. My balance and gait are improved. Please with my progress.  Subjective Questionnaire: LEFS: 37/80  Clinical Progress: improved  Home Program Compliance: Yes  Treatment has included: therapeutic exercise, neuromuscular re-education, manual therapy, therapeutic activity, gait training, electrical stimulation and cryotherapy    Objective   R Knee PROM :2-125  Strength :   Hip flex 4+/5  Quads and hams 5/5  HIp IR/ER 4+/5  Hip ABduction 5/5    Assessment/Plan   Short Term Goals (2 weeks)  1. Pt to exhibit 5-100 degrees of knee AROM to allow for improved gait and stair climbing  MET  2. Pt to report less pain with ADL's  MET  3. Pt able to ambulate household and clinic distances with straight cane and good heel strike  MET    Long Term Goals: (4 weeks)  1. Pt to exhibit 0-120 degrees  Knee AROM to allow for traversing curbs and ascending./ descending stairs MET FLEXION   2. Pt to report min to no pain with stair climbing    MET  3. Pt to score >50/80 on LEFS   PROGRESSING     Remaining goals appropriate at this time.  New goals  (1 month)   1. Patient will demonstrate improved gait speed, foot clearance, and equal stride length during flat ground ambulation. MET  2. Patient will tolerate > 45 min yard work without limitations PROGRESSING  3. Patient will demonstrate minimal extension deficit with flat ground ambulation. MET   4. Pt to perform floor to stand transfer without assistance.    Progress toward previous goals: Partially Met   Pt able to do 120# U leg press        Recommendations: Continue as planned  Timeframe: 3 weeks  Prognosis  to achieve goals: good    PT Signature: Caroline Vasquez, PT  [place] License # [id number]    Based upon review of the patient's progress and continued therapy plan, it is my medical opinion that Jose Hurtado should continue physical therapy treatment at [facility] PHYS[facility] THERAP[facility]  [address] [facility], [address] [address] [phone].    Signature: __________________________________  Renny Solis MD    Manual Therapy:    12     mins  10685;  Therapeutic Exercise:    25     mins  11369;     Neuromuscular Wil:        mins  26384;    Therapeutic Activity:     5     mins  36964;     Gait Trainin     mins  86869;     Ultrasound:          mins  24674;    Electrical Stimulation:         mins  84069 ( );  Dry Needling          mins self-pay    Timed Treatment:   50   mins   Total Treatment:     60   mins

## 2018-06-13 ENCOUNTER — TELEPHONE (OUTPATIENT)
Dept: INTERNAL MEDICINE | Age: 68
End: 2018-06-13

## 2018-06-13 ENCOUNTER — OFFICE VISIT (OUTPATIENT)
Dept: PHYSICAL THERAPY | Facility: CLINIC | Age: 68
End: 2018-06-13

## 2018-06-13 DIAGNOSIS — Z96.651 TOTAL KNEE REPLACEMENT STATUS, RIGHT: Primary | ICD-10-CM

## 2018-06-13 PROCEDURE — 97116 GAIT TRAINING THERAPY: CPT | Performed by: PHYSICAL THERAPIST

## 2018-06-13 PROCEDURE — 97110 THERAPEUTIC EXERCISES: CPT | Performed by: PHYSICAL THERAPIST

## 2018-06-13 NOTE — PROGRESS NOTES
Physical Therapy Daily Progress Note    Time In 08:30  Time Out 0920    Jose Hurtado reports: still doesn't feel his strength in his legs is where it should be at.  States that is still prevents him from doing things he wants to do.    Subjective     Objective   See Exercise, Manual, and Modality Logs for complete treatment.     Verbal and tactile cues to ensure proper step and squat with lunges(keep knee from going over toes).    Gait training:  Ambulated down hallway without assistive device ~ 300 feet.  Ambulated up and down one flight of stairs in stairwell x 4 times.  Use of rail with UE and cues toward reciprocal gait with descending of stairs and emphasis on heel strike.      Assessment/Plan:  Subjectively reports still with feeling of quad weakness in right LE which affects him with stairs and other ADL and functional activities around his house.   Benefits from continued quad strengthening efforts as well as verbal/tactile cueing for correct exercise performance and foot placement/positioning with ambulation and stairs.    Progress strengthening /stabilization /functional activity right quad           Manual Therapy:         mins  41955;  Therapeutic Exercise:   25   mins  73486;     Neuromuscular Wil:        mins  81029;    Therapeutic Activity:          mins  82148;     Gait Trainin    mins  63720;     Ultrasound:          mins  56114;    Electrical Stimulation:       mins  43078 ( );      Timed Treatment:  37    mins   Total Treatment:    50   mins    Rey Sterling PTA    Physical Therapist Assistant [place] [facility]

## 2018-06-13 NOTE — TELEPHONE ENCOUNTER
Pt called stating he was going to have a procedure done on 7-3-18 and needs to be off his coumadin for 10 days. He mentioned starting Lovenox.  Pt's # [phone]  [facility] pharmacy  Thanks SP

## 2018-06-14 NOTE — TELEPHONE ENCOUNTER
Pt called back, stated he is having urinary issues and Dr. Inman was going to do a Botox procedure with his bladder. It is scheduled for 07-03-18.  Thanks SP

## 2018-06-14 NOTE — TELEPHONE ENCOUNTER
I think we just did this for him (bridging).  Please check in the media, print out the letter that we sent so we can just change the dates.

## 2018-06-15 ENCOUNTER — OFFICE VISIT (OUTPATIENT)
Dept: PHYSICAL THERAPY | Facility: CLINIC | Age: 68
End: 2018-06-15

## 2018-06-15 DIAGNOSIS — Z96.651 TOTAL KNEE REPLACEMENT STATUS, RIGHT: Primary | ICD-10-CM

## 2018-06-15 PROCEDURE — 97116 GAIT TRAINING THERAPY: CPT | Performed by: PHYSICAL THERAPIST

## 2018-06-15 PROCEDURE — 97110 THERAPEUTIC EXERCISES: CPT | Performed by: PHYSICAL THERAPIST

## 2018-06-20 ENCOUNTER — TREATMENT (OUTPATIENT)
Dept: PHYSICAL THERAPY | Facility: CLINIC | Age: 68
End: 2018-06-20

## 2018-06-20 DIAGNOSIS — R26.9 ABNORMAL GAIT: ICD-10-CM

## 2018-06-20 DIAGNOSIS — Z96.651 TOTAL KNEE REPLACEMENT STATUS, RIGHT: Primary | ICD-10-CM

## 2018-06-20 PROCEDURE — 97140 MANUAL THERAPY 1/> REGIONS: CPT | Performed by: PHYSICAL THERAPIST

## 2018-06-20 PROCEDURE — 97110 THERAPEUTIC EXERCISES: CPT | Performed by: PHYSICAL THERAPIST

## 2018-06-20 NOTE — PROGRESS NOTES
Physical Therapy Daily Progress Note        Patient: Jose Hurtado   : 1950  Diagnosis/ICD-10 Code:  Total knee replacement status, right [Z96.651]  Referring practitioner: Renny Solis MD  Date of Initial Visit: Type: THERAPY  Noted: 2018  Today's Date: 2018  Patient seen for 27 sessions         Jose Hurtado reports:       Subjective   Patient reports his knee continues to be still and his walking is difficult at times.  Reports he is not wearing his compression garments today.    Objective   See Exercise, Manual, and Modality Logs for complete treatment.       Assessment/Plan  Patient tolerated treatment well with no adverse symptoms.  Improved patellar mobility post manual therapy.  Encouraged patient to wear compression garments and recommend active lymphedema treatment.  Progress per Plan of Care           Manual Therapy:    10     mins  72544;  Therapeutic Exercise:    15     mins  76543 +10 minutes concurrently;     Neuromuscular Wil:        mins  12706;    Therapeutic Activity:          mins  15770;     Gait Training:           mins  37450;     Ultrasound:          mins  31982;    Electrical Stimulation:         mins  35030 ( );  Dry Needling          mins self-pay    Timed Treatment:   25   mins   Total Treatment:     35   mins    Mervat Ramachandran PT  Physical Therapist

## 2018-06-21 ENCOUNTER — TELEPHONE (OUTPATIENT)
Dept: INTERNAL MEDICINE | Age: 68
End: 2018-06-21

## 2018-06-21 DIAGNOSIS — R60.9 EDEMA, UNSPECIFIED TYPE: Primary | ICD-10-CM

## 2018-06-21 NOTE — TELEPHONE ENCOUNTER
Patient had two questions for Dr. Mooney.    1. Want to know if he would put in a refferal to a Lymphedema clinic?     2. Wants to know if he would put in an order for him tog et some compression boots for him to wear a couple hours a day at home?     Patient is aware Dr. Mooney is out of the office and will return on Monday morning.

## 2018-06-21 NOTE — TELEPHONE ENCOUNTER
It sounds as if this patient is willing to wait for Dr. Mooney's return.  If so bring this message to his attention on Monday.

## 2018-06-25 NOTE — TELEPHONE ENCOUNTER
Okay to schedule consult with lymphedema clinic.  Diagnosis edema.  They will help him with the decision whether or not to use a boot.

## 2018-06-27 ENCOUNTER — OFFICE VISIT (OUTPATIENT)
Dept: PHYSICAL THERAPY | Facility: CLINIC | Age: 68
End: 2018-06-27

## 2018-06-27 ENCOUNTER — TELEPHONE (OUTPATIENT)
Dept: INTERNAL MEDICINE | Age: 68
End: 2018-06-27

## 2018-06-27 DIAGNOSIS — F32.A DEPRESSION, UNSPECIFIED DEPRESSION TYPE: Primary | ICD-10-CM

## 2018-06-27 DIAGNOSIS — R26.9 ABNORMAL GAIT: ICD-10-CM

## 2018-06-27 DIAGNOSIS — F32.9 REACTIVE DEPRESSION: ICD-10-CM

## 2018-06-27 DIAGNOSIS — Z96.651 TOTAL KNEE REPLACEMENT STATUS, RIGHT: Primary | ICD-10-CM

## 2018-06-27 PROCEDURE — 97116 GAIT TRAINING THERAPY: CPT | Performed by: PHYSICAL THERAPIST

## 2018-06-27 PROCEDURE — 97110 THERAPEUTIC EXERCISES: CPT | Performed by: PHYSICAL THERAPIST

## 2018-06-27 RX ORDER — DIAZEPAM 10 MG/1
TABLET ORAL
COMMUNITY
Start: 2018-05-29 | End: 2018-07-31

## 2018-06-27 RX ORDER — PAROXETINE 30 MG/1
30 TABLET, FILM COATED ORAL NIGHTLY
Qty: 90 TABLET | Refills: 1 | Status: SHIPPED | OUTPATIENT
Start: 2018-06-27 | End: 2018-07-05 | Stop reason: SDUPTHER

## 2018-06-27 NOTE — TELEPHONE ENCOUNTER
Please call in [employer] before his surgery on 7/3. He will not be taking his warfarin (COUMADIN) 5 MG tablet before the surgery.    DENIS RUIZ [address] [facility] [address] AT [facility] - [phone] [facility] [phone] FX    Thank you

## 2018-06-29 ENCOUNTER — OFFICE VISIT (OUTPATIENT)
Dept: PHYSICAL THERAPY | Facility: CLINIC | Age: 68
End: 2018-06-29

## 2018-06-29 DIAGNOSIS — M25.561 RIGHT KNEE PAIN, UNSPECIFIED CHRONICITY: ICD-10-CM

## 2018-06-29 DIAGNOSIS — R26.9 ABNORMAL GAIT: ICD-10-CM

## 2018-06-29 DIAGNOSIS — Z96.651 TOTAL KNEE REPLACEMENT STATUS, RIGHT: Primary | ICD-10-CM

## 2018-06-29 DIAGNOSIS — M17.11 PRIMARY OSTEOARTHRITIS OF RIGHT KNEE: ICD-10-CM

## 2018-06-29 PROCEDURE — 97110 THERAPEUTIC EXERCISES: CPT | Performed by: PHYSICAL THERAPIST

## 2018-06-29 PROCEDURE — 97530 THERAPEUTIC ACTIVITIES: CPT | Performed by: PHYSICAL THERAPIST

## 2018-07-03 NOTE — PROGRESS NOTES
Physical Therapy Daily Progress Note    Time In 0830  Time Out 930    Jose Hurtado reports: feels that his walking has improved with increased focus and awareness this past week.  States that he has been working on exercises diligently and has been using his treadmill at home on an increased basis.  Wishes to continue independently with exercises and activities and to return to PT only if issues arise or he experiences set back.      Subjective Evaluation    Pain  Current pain ratin  At best pain ratin  At worst pain ratin (ache)           Objective       Observations     Additional Observation Details  Noted improved arm swing with ambulation.  Still benefits from postural cues  Ambulating without assistive device more frequently(per patient report)    Active Range of Motion     Right Knee   Flexion: 125 degrees   Extension: 0 degrees     Strength/Myotome Testing     Right Knee   Flexion: 5  Extension: 5  Quadriceps contraction: good     See Exercise, Manual, and Modality Logs for complete treatment.   Importance of exercise performance continuation and progression.  Consider MD follow up regarding persistent LE swelling - may make ambulation and ROM easier     LEFS 58/80    Assessment/Plan  Patient has continued to improve with continued rehab efforts and is nearing/at point of discharge.  Subjective complaints of right knee pain have essentially resolved with only mild ache at times with certain activities.  Objectively, he presents with full right knee AROM and normal LE mm strength in regards to quad and hamstring.   He is Independent with his HEP and understands need for continuation and progression to maximize strength gains.  He has met his original LTG's that were previously unmet(#1 and #3_ as well as new updated LTG's(#2 and #4).  Pt exhibited improved LEFS score versus last measurement, safe capability for stairs and reports ability to progress safely with a wide assortment of outdoor yard  tasks/projects.  Should do well with discharge from PT.    Other  Pt to continue HEP independently x 2 weeks.  Will return to PT if difficulty during that time period, otherwise will consider discharged from PT.           Manual Therapy:       mins  73961;  Therapeutic Exercise:    20    mins  82685;     Neuromuscular Wil:      mins  72765;    Therapeutic Activity:     20     mins  00367;     Gait Training:           mins  89666;     Ultrasound:        mins  72277;    Electrical Stimulation:       mins  31691 ( );      Timed Treatment: 40     mins   Total Treatment:     60  mins    Rey Sterling PTA    Physical Therapist Assistant [place] [address]

## 2018-07-05 DIAGNOSIS — F32.9 REACTIVE DEPRESSION: ICD-10-CM

## 2018-07-05 RX ORDER — WARFARIN SODIUM 5 MG/1
5 TABLET ORAL
Qty: 30 TABLET | Refills: 2 | Status: SHIPPED | OUTPATIENT
Start: 2018-07-05 | End: 2018-08-14 | Stop reason: SDUPTHER

## 2018-07-05 RX ORDER — PAROXETINE 30 MG/1
30 TABLET, FILM COATED ORAL NIGHTLY
Qty: 90 TABLET | Refills: 1 | Status: SHIPPED | OUTPATIENT
Start: 2018-07-05 | End: 2019-02-13 | Stop reason: SDUPTHER

## 2018-07-05 RX ORDER — PRAMIPEXOLE DIHYDROCHLORIDE 1.5 MG/1
1.5 TABLET ORAL DAILY
Qty: 90 TABLET | Refills: 1 | Status: SHIPPED | OUTPATIENT
Start: 2018-07-05 | End: 2018-08-03 | Stop reason: SDUPTHER

## 2018-07-28 LAB — INR PPP: 1.9 (ref 2–3)

## 2018-07-30 ENCOUNTER — ANTICOAGULATION VISIT (OUTPATIENT)
Dept: INTERNAL MEDICINE | Age: 68
End: 2018-07-30

## 2018-07-31 ENCOUNTER — OFFICE VISIT (OUTPATIENT)
Dept: ORTHOPEDIC SURGERY | Facility: CLINIC | Age: 68
End: 2018-07-31

## 2018-07-31 VITALS — WEIGHT: 225 LBS | BODY MASS INDEX: 26.57 KG/M2 | TEMPERATURE: 99.1 F | HEIGHT: 77 IN

## 2018-07-31 DIAGNOSIS — Z96.651 STATUS POST TOTAL RIGHT KNEE REPLACEMENT: Primary | ICD-10-CM

## 2018-07-31 PROCEDURE — 99213 OFFICE O/P EST LOW 20 MIN: CPT | Performed by: NURSE PRACTITIONER

## 2018-07-31 PROCEDURE — 73562 X-RAY EXAM OF KNEE 3: CPT | Performed by: NURSE PRACTITIONER

## 2018-07-31 NOTE — PROGRESS NOTES
"Patient: Jose Hurtado  YOB: 1950 68 y.o. male  Medical Record Number: [id number]    Chief Complaints:   Chief Complaint   Patient presents with   • Right Knee - Follow-up, Pain   • Pain     right knee x 1 mth       History of Present Illness:Jose Hurtado is a 68 y.o. male who presents for follow-up of  Right total knee replacement.  Patient is been more active recently and is actually been feeling quite a bit doing some gardening.  He noticed some anterior knee pain just couple weeks ago.  He is here today to have that checked.    Allergies: No Known Allergies    Medications:   Current Outpatient Prescriptions   Medication Sig Dispense Refill   • PARoxetine (PAXIL) 30 MG tablet Take 1 tablet by mouth Every Night. 90 tablet 1   • pramipexole (MIRAPEX) 1.5 MG tablet Take 1 tablet by mouth Daily. 90 tablet 1   • warfarin (COUMADIN) 5 MG tablet Take 1 tablet by mouth Daily. Pt to stop 6 days before surgery & bridge with lovenox 30 tablet 2     No current facility-administered medications for this visit.          The following portions of the patient's history were reviewed and updated as appropriate: allergies, current medications, past family history, past medical history, past social history, past surgical history and problem list.    Review of Systems:   A 14 point review of systems was performed. All systems negative except pertinent positives/negative listed in HPI above    Physical Exam:   Vitals:    07/31/18 1418   Temp: 99.1 °F (37.3 °C)   Weight: 102 kg (225 lb)   Height: 195.6 cm (77\")       General: A and O x 3, ASA, NAD    SCLERA:    Normal    DENTITION:   Normal  Skin clear no unusual lesions noted  Right knee patient is nontender palpation he has excellent range of motion with no instability calf is soft and nontender    Radiology:  Xrays 3views (ap,lateral, sunrise) right knee were ordered and reviewed today secondary to pain and show well-placed well-positioned right total knee " replacement.  Comparative views are unchanged     Assessment/Plan:  Status post right TKA    Patient was given samples of Pennsaid gel to apply twice a day as needed.  He will let us know if he would like prescription.  He was also referred back to outpatient physical therapy for some gait training and he will keep follow-up appointment in November as scheduled

## 2018-08-03 ENCOUNTER — TREATMENT (OUTPATIENT)
Dept: PHYSICAL THERAPY | Facility: CLINIC | Age: 68
End: 2018-08-03

## 2018-08-03 ENCOUNTER — OFFICE VISIT (OUTPATIENT)
Dept: INTERNAL MEDICINE | Age: 68
End: 2018-08-03

## 2018-08-03 VITALS
TEMPERATURE: 97.5 F | BODY MASS INDEX: 27.56 KG/M2 | HEIGHT: 77 IN | OXYGEN SATURATION: 95 % | SYSTOLIC BLOOD PRESSURE: 130 MMHG | HEART RATE: 70 BPM | DIASTOLIC BLOOD PRESSURE: 68 MMHG | WEIGHT: 233.4 LBS

## 2018-08-03 DIAGNOSIS — M25.661 KNEE JOINT STIFFNESS, BILATERAL: ICD-10-CM

## 2018-08-03 DIAGNOSIS — I10 ESSENTIAL HYPERTENSION: Primary | ICD-10-CM

## 2018-08-03 DIAGNOSIS — R26.9 GAIT DISTURBANCE: Primary | ICD-10-CM

## 2018-08-03 DIAGNOSIS — R60.0 LOCALIZED EDEMA: ICD-10-CM

## 2018-08-03 DIAGNOSIS — G47.00 INSOMNIA, UNSPECIFIED TYPE: ICD-10-CM

## 2018-08-03 DIAGNOSIS — R29.898 WEAKNESS OF BOTH LEGS: ICD-10-CM

## 2018-08-03 DIAGNOSIS — G25.81 RLS (RESTLESS LEGS SYNDROME): ICD-10-CM

## 2018-08-03 DIAGNOSIS — M25.662 KNEE JOINT STIFFNESS, BILATERAL: ICD-10-CM

## 2018-08-03 PROCEDURE — 97161 PT EVAL LOW COMPLEX 20 MIN: CPT | Performed by: PHYSICAL THERAPIST

## 2018-08-03 PROCEDURE — G8978 MOBILITY CURRENT STATUS: HCPCS | Performed by: PHYSICAL THERAPIST

## 2018-08-03 PROCEDURE — 97110 THERAPEUTIC EXERCISES: CPT | Performed by: PHYSICAL THERAPIST

## 2018-08-03 PROCEDURE — G8979 MOBILITY GOAL STATUS: HCPCS | Performed by: PHYSICAL THERAPIST

## 2018-08-03 PROCEDURE — 99214 OFFICE O/P EST MOD 30 MIN: CPT | Performed by: INTERNAL MEDICINE

## 2018-08-03 RX ORDER — BUMETANIDE 2 MG/1
2 TABLET ORAL DAILY
Qty: 30 TABLET | Refills: 1 | Status: SHIPPED | OUTPATIENT
Start: 2018-08-03 | End: 2018-09-13

## 2018-08-03 RX ORDER — POTASSIUM CHLORIDE 750 MG/1
10 TABLET, EXTENDED RELEASE ORAL DAILY
Qty: 30 TABLET | Refills: 1 | Status: SHIPPED | OUTPATIENT
Start: 2018-08-03 | End: 2018-09-13

## 2018-08-03 RX ORDER — ACETAMINOPHEN,DIPHENHYDRAMINE HCL 500; 25 MG/1; MG/1
2 TABLET, FILM COATED ORAL NIGHTLY PRN
COMMUNITY
End: 2018-10-31

## 2018-08-03 RX ORDER — PRAMIPEXOLE DIHYDROCHLORIDE 1.5 MG/1
3 TABLET ORAL NIGHTLY
Qty: 90 TABLET | Refills: 1
Start: 2018-08-03 | End: 2018-12-07 | Stop reason: DRUGHIGH

## 2018-08-03 NOTE — PROGRESS NOTES
Physical Therapy Initial Evaluation and Plan of Care      Patient: Jose Hurtado   : 1950  Diagnosis/ICD-10 Code:  Gait disturbance [R26.9]  Referring practitioner: FAYE Stern  Date of Initial Visit: 8/3/2018  Today's Date: 8/3/2018          Subjective Evaluation    History of Present Illness  Date of surgery: 3/29/2018 (3/29/18)  Mechanism of injury: Patient reports that since his R knee replacement his range of motion has improved quite a bit as has his strength. He feels however that his gait is forced. He has improved gait with AD (S. Cane) but he would not like to continue using this for normal gait. He reports that his gait feels forced. He feels that he has to think about walking more than he should. He reports that his knee pain has diminished some but that he continues to have anterior knee pain. Primary complaint at this time is his gait.     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     Tenderness     Additional Tenderness Details  Some soreness at anterior/ lateral R knee with gait and palpation    Neurological Testing     Sensation     Knee   Left Knee   Intact: light touch    Right Knee   Intact: light touch     Active Range of Motion   Left Knee   Flexion: 110 degrees   Extensor lag: 10 degrees     Right Knee   Flexion: 114 degrees   Extensor la degrees     Passive Range of Motion   Left Knee   Flexion: 120 degrees     Additional Passive Range  of Motion Details  Able to push to 5 deg short of neutral with R knee    Strength/Myotome Testing     Left Hip   Planes of Motion   Flexion: 4-  Abduction: 3    Right Hip   Planes of Motion   Flexion: 4  Abduction: 3    Left Knee   Flexion: 4+  Extension: 4+  Quadriceps contraction: fair    Right Knee   Flexion: 4+  Extension: 4+  Quadriceps contraction: fair    Tests     Additional Tests Details  Gait eval notes in ambulation portion of this note.     Ambulation     Ambulation: Stairs   Pattern: reciprocal  Railings: one rail  Pattern: reciprocal  Railings: one rail    Observational Gait   Decreased walking speed and stride length.   Left arm swing: decreased  Right arm swing: decreased    Additional Observational Gait Details  Forward bend  Rigid trunk  Reduced speed  Reduced clearance   Lack of knee extension   increased valgus at L knee  (reports that he had his L patella removed in 1973)      Functional Assessment     Comments  Sit to stand (table height 23 inches)  5x STS time 18 seconds  Increase valgus (B) (hip weakness)  Increased weight distribution to LLE  Sitting to (L) weight shift    Sitting to 18 inch height: unable to control motion at bottom range  Unable to stand without UE on 1st attempt and using significant momentum to stand on 2nd trail.              Assessment & Plan     Assessment  Impairments: abnormal coordination, abnormal gait, abnormal or restricted ROM, activity intolerance, impaired balance, impaired physical strength, lacks appropriate home exercise program, pain with function and safety issue  Assessment details: Patient presents with gait deficits (CC) and R knee pain. Hx of (R) knee replacement in march of 2018. He has limitations in ROM in both knees, significant deficits in gait mechanics, hip weakness, and increased fall risk.   Pt would benefit from skilled PT services in order to address listed impairments and increase tolerance to normal daily activities including ADLs, work  "and recreational activities.             Prognosis: fair  Prognosis details: Goals  3 weeks. Pt will:  1. Demonstrate improve R knee AROM extension to < 8 deg   2. Patient will perform squat from 22\" height with minimal weight shifting  3. Patient will Perform sit<>stand from chair with good control  4. Patient will be (I) with initial HEP for mobility and strengthening.     6 weeks. Pt will:  1. Demonstrate improved (B) hip MMT of >/= 4/5 globally   2. Report pain of </= 2/10 with all ADLs  3. LEFS >/= 50/80 showing a significant change in functional status.  4. Perform 5x sit to stand from 23\" height in < 15 seconds with good symmetry.   5. Improve knee extension AROM to < 5  deg short or extension AROM  6. Patient will report > 50 % improvement in ease of daily ambulation     Functional Limitations: walking and standing  Plan  Therapy options: will be seen for skilled physical therapy services  Planned modality interventions: electrical stimulation/Russian stimulation, high voltage pulsed current (pain management), iontophoresis, microcurrent electrical stimulation, TENS and ultrasound  Planned therapy interventions: manual therapy, motor coordination training, neuromuscular re-education, postural training, soft tissue mobilization, spinal/joint mobilization, strengthening, stretching, therapeutic activities, joint mobilization, IADL retraining, home exercise program, gait training, functional ROM exercises, flexibility, fine motor coordination training, body mechanics training, balance/weight-bearing training, ADL retraining and abdominal trunk stabilization  Frequency: 2x week  Duration in weeks: 8  Treatment plan discussed with: patient  Plan details: Initial HEP was given on this date.         Manual Therapy:         mins  31705;  Therapeutic Exercise:    15     mins  70830;     Neuromuscular Wil:        mins  12222;    Therapeutic Activity:          mins  80218;     Gait Training:           mins  55550;   "   Ultrasound:          mins  29449;    Electrical Stimulation:         mins  97498 ( );  Dry Needling          mins self-pay    Timed Treatment:   15   mins   Total Treatment:     48   mins    PT SIGNATURE: Gonzalo Solis PT DPT   [place] License # [id number]  DATE TREATMENT INITIATED: 8/3/2018    Initial Certification  Certification Period: 11/1/2018  I certify that the therapy services are furnished while this patient is under my care.  The services outlined above are required by this patient, and will be reviewed every 90 days.     PHYSICIAN: Luba Osuna, FAYE   ________________________________     DATE: ______________    Please sign and return via fax to [phone].. Thank you, [facility].

## 2018-08-03 NOTE — PATIENT INSTRUCTIONS
Access Code: [email or web address]   URL: [email or web address]   Date: 08/03/2018   Prepared by: Gonzalo Solis     Exercises  Supine Bridge - 12 reps - 2 sets - 2 hold - 1x daily - 5x weekly  Standing Hip Abduction with Counter Support - 15 reps - 2 sets - 2 hold - 1x daily - 5x weekly  Standing Hip Extension with Unilateral Counter Support - 15 reps - 2 sets - 2 hold - 1x daily - 5x weekly  Standing Marching - 20 reps - 2 sets - 2 hold - 1x daily - 5x weekly                   Side Stepping with Counter Support - 15 reps - 2 sets - 2 hold - 1x daily - 5x weekly

## 2018-08-03 NOTE — PROGRESS NOTES
"[facility] [place] [facility]  MD Jose VARGAS W Hurtado / 68 y.o. / male  08/03/2018      ASSESSMENT & PLAN:    Problem List Items Addressed This Visit        Unprioritized    Insomnia    Relevant Orders    Ambulatory Referral to Sleep Medicine    RLS (restless legs syndrome)    Hypertension - Primary    Relevant Medications    bumetanide (BUMEX) 2 MG tablet      Other Visit Diagnoses     Localized edema        Relevant Medications    bumetanide (BUMEX) 2 MG tablet    potassium chloride (K-DUR,KLOR-CON) 10 MEQ CR tablet        Orders Placed This Encounter   Procedures   • Ambulatory Referral to Sleep Medicine       Summary/Discussion: Number-one hypertension by history, patient is no longer on medication, I believe that this problem has been resolved very no additional follow-up needed.    #2 restless leg syndrome stable on current dose of medication.  Plan: Continue same.    #3 resistant edema since spring of this year, evaluation for DVT was negative earlier this year as well.  Recommend patient continue use of stockings on a regular basis elevate his legs, and Bumex 2 mg a day along with 10 mEq of potassium daily in the morning.  Hopefully this will eliminate some of the swelling make his leg less uncomfortable.  We'll see him back in about 6 weeks.  Patient is advised that if he becomes hypotensive and orthostatic to stop the medicine and contact me.    #4 insomnia refer patient sleep medicine.      Return in about 6 weeks (around 9/14/2018).    ____________________________________________________________________    VITALS    Visit Vitals  /68   Pulse 70   Temp 97.5 °F (36.4 °C)   Ht 195.6 cm (77.01\")   Wt 106 kg (233 lb 6.4 oz)   SpO2 95%   BMI 27.67 kg/m²       BP Readings from Last 3 Encounters:   08/03/18 130/68   05/21/18 120/72   05/10/18 138/72     Wt Readings from Last 3 Encounters:   08/03/18 106 kg (233 lb 6.4 oz)   07/31/18 102 kg (225 lb)   05/22/18 108 kg (239 lb)      Body mass index is " 27.67 kg/m².    CC:  Main reason(s) for today's visit: Hypertension (6 mth F/U )      HPI:     Patient presents this morning for follow-up of a history of hypertension.  He has been off medication since at least May of this year and is doing well.  Blood pressure at home has been running in the 120s over 50s range.  He is also compliant with dietary salt restriction.  He is exercising and intentionally losing weight, his lost approximate 6 pounds since earlier this year    .  Patient has restless leg syndrome is using Mirapex at 1.5 mg tablets 2 tabs at bedtime.  This does exceed the recommended dosage for treatment of restless leg syndrome, however the medication can be safely used in case of Parkinson disease up to 4.5 mg per day.  Since his symptoms are resolved, and we are within the therapeutic range of the usage of the medication, I felt comfortable with him at this dosage.    Laboratory review May 10, 2018 CMP and lipids normal, see below for specific values.  No labs needed today.  INR was 2.5 last night.    Patient had tense edema since spring of this year.  He has somewhat compliant using his support stockings.  Agreeable to trying Bumex 4 edema reduction.    Patient requests referral to sleep medicine.  Has difficulty sleeping, has daytime somnolence, and awakens feeling unrested.    Patient Care Team:  George Mooney MD as PCP - General  Code, George THORPE II, MD as Consulting Physician (Hematology and Oncology)  Jose Inman MD as Consulting Physician (Urology)  Mulugeta Millan MD as Consulting Physician (Gastroenterology)  Seth Solomon MD as Consulting Physician (Cardiology)  Jose Christine III, MD as Referring Physician (Thoracic Surgery)  ____________________________________________________________________    REVIEW OF SYSTEMS    Review of Systems   Respiratory: Negative for chest tightness and shortness of breath.    Cardiovascular: Negative for chest pain and palpitations.    Neurological: Negative for dizziness, syncope, speech difficulty, weakness, light-headedness and headaches.         PHYSICAL EXAMINATION    Physical Exam   Constitutional: He is oriented to person, place, and time. He appears well-developed and well-nourished. No distress.   Cardiovascular: Normal rate, regular rhythm, normal heart sounds and intact distal pulses.  Exam reveals no gallop and no friction rub.    No murmur heard.  Pulmonary/Chest: Effort normal and breath sounds normal. He has no wheezes. He has no rales.   Musculoskeletal: He exhibits edema.   Tense edema noted left lower extremity greater than right.  This is been present since the spring of this year.  Imaging has been done to evaluate for DVT which was negative.   Neurological: He is alert and oriented to person, place, and time.   Skin: Skin is warm and dry. No rash noted.   Psychiatric: He has a normal mood and affect. His behavior is normal. Judgment and thought content normal.   Nursing note and vitals reviewed.        REVIEWED DATA:    Labs:     Lab Results   Component Value Date     05/10/2018    K 4.8 05/10/2018    AST 19 05/10/2018    ALT 15 05/10/2018    BUN 18 05/10/2018    CREATININE 0.93 05/10/2018    CREATININE 1.16 03/15/2018    CREATININE 1.17 03/08/2018    EGFRIFNONA 80 05/10/2018    EGFRIFAFRI 97 05/10/2018       Lab Results   Component Value Date    GLUCOSE 108 (H) 03/15/2018    GLUCOSE 103 03/08/2018    GLUCOSE 86 09/15/2017       Lab Results   Component Value Date    LDL 92 05/10/2018     (H) 01/30/2018    LDL 98 01/26/2017    HDL 45 05/10/2018    TRIG 79 05/10/2018       Lab Results   Component Value Date    TSH 3.29 04/28/2015       Lab Results   Component Value Date    WBC 3.44 (L) 03/15/2018    HGB 9.0 (L) 03/30/2018    HGB 7.6 (L) 03/29/2018    HGB 8.4 (L) 03/28/2018     03/15/2018       No results found for: PSA      Imaging:         Medical Tests:         Summary of old records / correspondence /  consultant report:         Request outside records:         ALLERGIES  No Known Allergies     MEDICATIONS  Current Outpatient Prescriptions   Medication Sig Dispense Refill   • diphenhydrAMINE-acetaminophen (TYLENOL PM)  MG tablet per tablet Take 2 tablets by mouth At Night As Needed.     • PARoxetine (PAXIL) 30 MG tablet Take 1 tablet by mouth Every Night. 90 tablet 1   • pramipexole (MIRAPEX) 1.5 MG tablet Take 2 tablets by mouth Every Night. 90 tablet 1   • warfarin (COUMADIN) 5 MG tablet Take 1 tablet by mouth Daily. Pt to stop 6 days before surgery & bridge with lovenox 30 tablet 2   • bumetanide (BUMEX) 2 MG tablet Take 1 tablet by mouth Daily. 30 tablet 1   • potassium chloride (K-DUR,KLOR-CON) 10 MEQ CR tablet Take 1 tablet by mouth Daily. 30 tablet 1     No current facility-administered medications for this visit.        PFSH:     The following portions of the patient's history were reviewed and updated as appropriate: Allergies / Current Medications / Past Medical History / Surgical History / Social History / Family History    PROBLEM LIST   Patient Active Problem List   Diagnosis   • Adenocarcinoma of esophagus (CMS/HCC)   • Adenomatous polyp of colon   • Anemia   • Duodenal ulcer   • Insomnia   • Malignant neoplasm of prostate (CMS/HCC)   • RLS (restless legs syndrome)   • Thrombophlebitis of deep veins of lower extremity (CMS/HCC)   • Ventral hernia without obstruction or gangrene   • Incisional hernia, without obstruction or gangrene   • Hernia, incisional   • Osteoarthrosis, localized, primary, knee   • BPH (benign prostatic hyperplasia)   • Stasis dermatitis   • Medicare annual wellness visit, subsequent   • Reactive depression   • DVT (deep venous thrombosis) (CMS/HCC)   • Hammer toes of both feet   • Other hammer toe(s) (acquired), left foot   • Status post left foot surgery   • Surgery follow-up-Left second third and fourth distal interphalangeal joint resection with flexor tenotomy - Left    • Other hyperlipidemia   • Primary osteoarthritis of right knee   • OA (osteoarthritis) of knee   • Hypertension   • Anti-phospholipid syndrome (CMS/HCC)       PAST MEDICAL HISTORY  Past Medical History:   Diagnosis Date   • Anemia    • Anti-phospholipid syndrome (CMS/HCC)     On lifelong anticoagulation therapy   • Arthritis     RIGHT KNEE   • Bladder disorder     LEAKAGE   ON MED  wers pads   • Community acquired pneumonia     HISTORY OF IN 2014   • Deep venous thrombosis (CMS/HCC) 2006, 2008    Left lower extremity multiple   • Depression    • Esophageal carcinoma (CMS/HCC) 12/31/2014    had chemo and radiation prior surgery   • Fatigue    • Hammer toe of left foot     had surgery   • Hemorrhoids    • HH (hiatus hernia)    • History of atrial fibrillation 2015    ONE EPISODE WHILE HOSPITALIZED   • History of kidney stones    • History of nephrolithiasis    • History of pancreatitis     PT STATES MANY YEARS AGO   • History of radiation therapy    • Hypertension    • Long-term (current) use of anticoagulants, INR goal 2.0-3.0    • Malignant neoplasm of prostate (CMS/HCC)    • Restless legs syndrome    • Squamous carcinoma     on the head   • Stasis dermatitis    • Warfarin anticoagulation        SURGICAL HISTORY  Past Surgical History:   Procedure Laterality Date   • APPENDECTOMY  1950   • BRONCHOSCOPY      (Diagnostic)   • CATARACT EXTRACTION Bilateral 2014   • COLONOSCOPY  12/15/2014    Complete / Description: EH, IH, torts, stool, follow-up colonoscopy due in 5 years.   • COLONOSCOPY N/A 6/13/2017    Procedure: COLONOSCOPY TO CECUM AND INTO TERMINAL ILEUM;  Surgeon: Mulugeta BLACKWELL MD;  Location: [facility] ENDOSCOPY;  Service:    • CYSTOSCOPY W/ LASER LITHOTRIPSY     • ENDOSCOPY N/A 6/13/2017    Procedure: ESOPHAGOGASTRODUODENOSCOPY WITH COLD BIOPSY;  Surgeon: Lake Gonzalez MD;  Location: [facility] ENDOSCOPY;  Service:    • ESOPHAGECTOMY      April 2015, stage IIB esophageal carcinoma, sub-total resection.    • ESOPHAGECTOMY      Esophagectomy Subtotal Andrea Joe Procedure   • EXCISION LESION  08/2012    Removal of Squamous Cell CA on Head   • HAMMER TOE REPAIR  09/2014    Hammertoe Operation (Each Toe), 10/2014   • HAMMER TOE REPAIR Left 10/3/2017    Procedure: Left second third and fourth distal interphalangeal joint resection with flexor tenotomy;  Surgeon: Mulugeta Lira MD;  Location: [facility];  Service:    • HERNIA REPAIR      incisional   • JEJUNOSTOMY      Laparoscopic   • JEJUNOSTOMY      tube removal    • KNEE SURGERY Bilateral 1967, 1973, 1981   • KNEE SURGERY Right 03/26/2018    TKR   • PATELLA SURGERY Left     removed   • PILONIDAL CYST / SINUS EXCISION     • AK TOTAL KNEE ARTHROPLASTY Right 3/26/2018    Procedure: TOTAL KNEE ARTHROPLASTY;  Surgeon: Renny Solis MD;  Location: [facility];  Service: Orthopedics   • PROSTATECTOMY  2010   • PYLOROPLASTY     • SPINAL FUSION  02/1998    C 5,6   • TONSILLECTOMY  1955   • TONSILLECTOMY     • UPPER GASTROINTESTINAL ENDOSCOPY  12/15/2014    LA Grade D esophagitis, Pardo's, HH, multiple duodenal ulcers   • UPPER GASTROINTESTINAL ENDOSCOPY      (Therapeutic)   • VENTRAL/INCISIONAL HERNIA REPAIR N/A 4/14/2016    Procedure: VENTRAL/INCISIONAL HERNIA REPAIR, open, with mesh, and component separation;  Surgeon: Darren Rivas MD;  Location: [facility];  Service:        SOCIAL HISTORY  Social History     Social History   • Marital status:      Spouse name: Lena   • Number of children: 0   • Years of education: College     Occupational History   •  Self-Employed   •  Retired     Social History Main Topics   • Smoking status: Former Smoker     Packs/day: 2.00     Years: 2.00     Types: Cigarettes     Quit date: 1974   • Smokeless tobacco: Never Used   • Alcohol use 1.8 oz/week     1 Glasses of wine, 1 Cans of beer, 1 Shots of liquor per week   • Drug use: No   • Sexual activity: Defer     Other Topics Concern   • Not on  file     Social History Narrative    self-employed        FAMILY HISTORY  Family History   Problem Relation Age of Onset   • Cerebral aneurysm Mother         cerebral artery aneurysm   • Prostate cancer Brother 68   • Anxiety disorder Father    • Malig Hyperthermia Neg Hx        Health Maintenance Due   Topic   • ZOSTER VACCINE (2 of 3)   • INFLUENZA VACCINE        Overdue health maintenance interventions  reviewed with patient.    Dictated utilizing Dragon voice recognition software

## 2018-08-06 ENCOUNTER — OFFICE VISIT (OUTPATIENT)
Dept: PHYSICAL THERAPY | Facility: CLINIC | Age: 68
End: 2018-08-06

## 2018-08-06 DIAGNOSIS — M25.662 KNEE JOINT STIFFNESS, BILATERAL: ICD-10-CM

## 2018-08-06 DIAGNOSIS — M25.661 KNEE JOINT STIFFNESS, BILATERAL: ICD-10-CM

## 2018-08-06 DIAGNOSIS — R26.9 GAIT DISTURBANCE: Primary | ICD-10-CM

## 2018-08-06 DIAGNOSIS — R29.898 WEAKNESS OF BOTH LEGS: ICD-10-CM

## 2018-08-06 PROCEDURE — 97110 THERAPEUTIC EXERCISES: CPT | Performed by: PHYSICAL THERAPIST

## 2018-08-06 PROCEDURE — 97530 THERAPEUTIC ACTIVITIES: CPT | Performed by: PHYSICAL THERAPIST

## 2018-08-06 NOTE — PROGRESS NOTES
Physical Therapy Daily Progress Note    Time In 0805  Time Out 0857    Jose Hurtado reports: worked hard on exercises last night. Still has a little bit of right knee pain(along the front)    Subjective     Objective   See Exercise, Manual, and Modality Logs for complete treatment.   Increased reps with exercises  Added ball squeezes and swiss ball to bridges    Assessment/Plan  Subjective complaints of right knee soreness(mm) and discomfort(infrapatella) persist.  Able to increase exercise repetitions/difficulty with bridges.  Weakness noted in regards to right hip flexion and R LE balance vs right especially with low julieta activities.    Progress strengthening for affected musculature to include increased balance activities.           Manual Therapy:         mins  42626;  Therapeutic Exercise:    25     mins  07249;     Neuromuscular Wil:        mins  97848;    Therapeutic Activity:     15     mins  20390;     Gait Training:           mins  85302;     Ultrasound:          mins  34218;    Electrical Stimulation:         mins  54320 ( );      Timed Treatment:  40    mins   Total Treatment:    52    mins    Rey Sterling PTA    Physical Therapist Assistant [place] 4750

## 2018-08-08 ENCOUNTER — OFFICE VISIT (OUTPATIENT)
Dept: PHYSICAL THERAPY | Facility: CLINIC | Age: 68
End: 2018-08-08

## 2018-08-08 DIAGNOSIS — M25.662 KNEE JOINT STIFFNESS, BILATERAL: ICD-10-CM

## 2018-08-08 DIAGNOSIS — M25.661 KNEE JOINT STIFFNESS, BILATERAL: ICD-10-CM

## 2018-08-08 DIAGNOSIS — R26.9 GAIT DISTURBANCE: Primary | ICD-10-CM

## 2018-08-08 DIAGNOSIS — R29.898 WEAKNESS OF BOTH LEGS: ICD-10-CM

## 2018-08-08 PROCEDURE — 97110 THERAPEUTIC EXERCISES: CPT | Performed by: PHYSICAL THERAPIST

## 2018-08-08 PROCEDURE — 97530 THERAPEUTIC ACTIVITIES: CPT | Performed by: PHYSICAL THERAPIST

## 2018-08-08 PROCEDURE — 97140 MANUAL THERAPY 1/> REGIONS: CPT | Performed by: PHYSICAL THERAPIST

## 2018-08-08 NOTE — PROGRESS NOTES
Physical Therapy Daily Progress Note    Time In 0815  Time Out 0925    Jose Hurtado reports: right leg is more swollen today.  Notes soreness along the outside of his right knee.    Subjective     Objective   Noted increased edema/swelling generalized throughout right LE(previous history of lymphodema and lower leg edema)  Palpable tenderness along right lateral joint line/lateral knee(lateral hamstring insertion site)  Hamstring restrictions noted bilaterally;  Ambulates with noted knee flexion and mildly forward trunk posture/positioning.    Right knee flexion 122 deg; 0 deg ext    See Exercise, Manual, and Modality Logs for complete treatment.   Continued use of home ice and performance of previous quad/hamstring and hip/glut exercises.  Manual stretch of bilateral hamstrings, ITB and quads      Assessment/Plan  Lateral right knee tenderness primarily along lateral hamstring due to mm tightness..  Benefits from manual stretching of affected musculature designed to allow for improved pelvic and LE positioning with ambulation activities.      Progress per Plan of Care toward all goals.  Progress isolated musculature strengthening exercises for affected musclature           Manual Therapy:   15      mins  27778;  Therapeutic Exercise:   20     mins  37936;     Neuromuscular Wil:        mins  45579;    Therapeutic Activity:   15      mins  04844;     Gait Training:           mins  72896;     Ultrasound:          mins  23556;    Electrical Stimulation:         mins  20622 ( );      Timed Treatment:  50    mins   Total Treatment:    70    mins    Rey Sterling PTA    Physical Therapist Assistant [place] 2377

## 2018-08-10 ENCOUNTER — OFFICE VISIT (OUTPATIENT)
Dept: PHYSICAL THERAPY | Facility: CLINIC | Age: 68
End: 2018-08-10

## 2018-08-10 DIAGNOSIS — R26.9 GAIT DISTURBANCE: Primary | ICD-10-CM

## 2018-08-10 DIAGNOSIS — M25.661 KNEE JOINT STIFFNESS, BILATERAL: ICD-10-CM

## 2018-08-10 DIAGNOSIS — M25.662 KNEE JOINT STIFFNESS, BILATERAL: ICD-10-CM

## 2018-08-10 DIAGNOSIS — R29.898 WEAKNESS OF BOTH LEGS: ICD-10-CM

## 2018-08-10 PROCEDURE — 97530 THERAPEUTIC ACTIVITIES: CPT | Performed by: PHYSICAL THERAPIST

## 2018-08-10 PROCEDURE — 97116 GAIT TRAINING THERAPY: CPT | Performed by: PHYSICAL THERAPIST

## 2018-08-10 NOTE — PROGRESS NOTES
Physical Therapy Daily Progress Note    Time In 0744  Time Out 0830    Jose Hurtado reports: feel like my walking is getting better.    Subjective     Objective   See Exercise, Manual, and Modality Logs for complete treatment.   Added: seated hip flexion marches with black t band; standing hip flexion and abd B LE with red t band; standing hip abd walking with red t band; standing hip flexion with toe taps on foam roll; 6 inch step up with knee  B LE     Gait training in hallway x 10 min.  Ambulated 300 ft in 41 sec.  Practiced proper trunk posture and positioning as well as purposeful gait with normal step and stride length awareness.  Improved after 4 attempts to be able to perform in 37 seconds.    Assessment/Plan  Improvement in regards to gait(step and stride length) and posture/positioning of trunk and LE with repetitive ambulation efforts(verbal/tactile cues) as evidenced by decrease in time to cover same footage.  Able to perform isolated, resistive mm strengthening exercises/activitiesfor affected musculature though some decreased balance and hip flexor strength noted left LE with performance.    Progress strengthening /stabilization /functional activity           Manual Therapy:         mins  98940;  Therapeutic Exercise:         mins  54407;     Neuromuscular Wil:       mins  89499;    Therapeutic Activity:    30     mins  52671;     Gait Training:    10     mins  43706;     Ultrasound:         mins  13772;    Electrical Stimulation:        mins  78837 ( );      Timed Treatment:   40   mins   Total Treatment:    46   mins    Rey Sterling PTA    Physical Therapist Assistant [place] 1987

## 2018-08-13 ENCOUNTER — TELEPHONE (OUTPATIENT)
Dept: INTERNAL MEDICINE | Age: 68
End: 2018-08-13

## 2018-08-13 ENCOUNTER — OFFICE VISIT (OUTPATIENT)
Dept: PHYSICAL THERAPY | Facility: CLINIC | Age: 68
End: 2018-08-13

## 2018-08-13 DIAGNOSIS — M25.662 KNEE JOINT STIFFNESS, BILATERAL: ICD-10-CM

## 2018-08-13 DIAGNOSIS — R26.9 GAIT DISTURBANCE: Primary | ICD-10-CM

## 2018-08-13 DIAGNOSIS — R29.898 WEAKNESS OF BOTH LEGS: ICD-10-CM

## 2018-08-13 DIAGNOSIS — M25.661 KNEE JOINT STIFFNESS, BILATERAL: ICD-10-CM

## 2018-08-13 PROCEDURE — 97110 THERAPEUTIC EXERCISES: CPT | Performed by: PHYSICAL THERAPIST

## 2018-08-13 PROCEDURE — 97530 THERAPEUTIC ACTIVITIES: CPT | Performed by: PHYSICAL THERAPIST

## 2018-08-13 NOTE — PROGRESS NOTES
"Physical Therapy Daily Progress Note    Time In 0825  Time Out 0915    Jose Hurtado reports: hip exercises are working me good.  \" I can tell that is what I need, especially on the left\" states patient.     Subjective     Objective   See Exercise, Manual, and Modality Logs for complete treatment.   Treadmill x 8 min fwd      Assessment/Plan  Pt encouraged to perform exercises with slow, controlled movements to maximize benefits and strength gains. Gait is improved though still exhibits guarded upper extremity movements.  Noted use of increased UE high guarding to assist with maintaining balance with julieta activities.  Most likely associated with fatigue from near constant activity/exercise with minimal rest break.  Compliant/cooperative/motivated with rehab efforts.    Progress per Plan of Care toward all goals. Continue with hip strengthening and gait activities.           Manual Therapy:         mins  43106;  Therapeutic Exercise:    30   mins  18294;     Neuromuscular Wil:       mins  60453;    Therapeutic Activity:    10      mins  85104;     Gait Training:           mins  64380;     Ultrasound:          mins  08667;    Electrical Stimulation:         mins  36280 ( );      Timed Treatment:  40    mins   Total Treatment:     50   mins    Rey Sterling PTA    Physical Therapist Assistant [place] 1181    "

## 2018-08-14 RX ORDER — WARFARIN SODIUM 5 MG/1
TABLET ORAL
Qty: 30 TABLET | Refills: 1 | Status: SHIPPED | OUTPATIENT
Start: 2018-08-14 | End: 2018-10-28 | Stop reason: SDUPTHER

## 2018-08-16 ENCOUNTER — TREATMENT (OUTPATIENT)
Dept: PHYSICAL THERAPY | Facility: CLINIC | Age: 68
End: 2018-08-16

## 2018-08-16 DIAGNOSIS — M25.662 KNEE JOINT STIFFNESS, BILATERAL: ICD-10-CM

## 2018-08-16 DIAGNOSIS — R29.898 WEAKNESS OF BOTH LEGS: ICD-10-CM

## 2018-08-16 DIAGNOSIS — R26.9 GAIT DISTURBANCE: Primary | ICD-10-CM

## 2018-08-16 DIAGNOSIS — M25.661 KNEE JOINT STIFFNESS, BILATERAL: ICD-10-CM

## 2018-08-16 PROCEDURE — 97110 THERAPEUTIC EXERCISES: CPT | Performed by: PHYSICAL THERAPIST

## 2018-08-16 NOTE — PROGRESS NOTES
Physical Therapy Daily Progress Note        Patient: Jose Hurtado   : 1950  Diagnosis/ICD-10 Code:  Gait disturbance [R26.9]  Referring practitioner: FAYE Stern  Date of Initial Visit: Type: THERAPY  Noted: 8/3/2018  Today's Date: 2018  Patient seen for 6 sessions         Jose Hurtado reports:       Subjective   Patient reports his legs are more swollen and feel tighter than usual today.    Objective   See Exercise, Manual, and Modality Logs for complete treatment.       Assessment/Plan  Patient presents with increased bilateral hip and knee flexion in standing and during gait.  Noted increased LE edema in bilateral LEs.  Added hamstring and hip flexor stretches to exercise program.  Patient would benefit from lymphedema care to reduce bilateral LE edema.  Progress per Plan of Care           Manual Therapy:         mins  42158;  Therapeutic Exercise:    40     mins  62236;     Neuromuscular Wil:        mins  98557;    Therapeutic Activity:          mins  34816;     Gait Training:           mins  80508;     Ultrasound:          mins  66522;    Electrical Stimulation:         mins  40554 ( );  Dry Needling          mins self-pay    Timed Treatment:   40   mins   Total Treatment:     40   mins    Mervat Ramachandran, PT  Physical Therapist

## 2018-08-20 ENCOUNTER — TREATMENT (OUTPATIENT)
Dept: PHYSICAL THERAPY | Facility: CLINIC | Age: 68
End: 2018-08-20

## 2018-08-20 DIAGNOSIS — R26.9 GAIT DISTURBANCE: Primary | ICD-10-CM

## 2018-08-20 PROCEDURE — 97140 MANUAL THERAPY 1/> REGIONS: CPT | Performed by: PHYSICAL THERAPIST

## 2018-08-20 PROCEDURE — 97110 THERAPEUTIC EXERCISES: CPT | Performed by: PHYSICAL THERAPIST

## 2018-08-20 PROCEDURE — 97112 NEUROMUSCULAR REEDUCATION: CPT | Performed by: PHYSICAL THERAPIST

## 2018-08-20 NOTE — PROGRESS NOTES
Physical Therapy Daily Progress Note        Subjective     Jose Hurtado reports: I am some better but  Gait not as smooth as I would like    Objective   See Exercise, Manual, and Modality Logs for complete treatment.       Assessment/Plan  Worked a lot on increasing hip extension with gait cycle and repeated reminders to stand tall. Felt stretch in back with ball flex up wall as well as shoulder stretch. Pt struggled with SLS reach exercises. LLE less stable than R    Progress per Plan of Care           Manual Therapy:  6        mins  23775;  Therapeutic Exercise: 20      mins  37412;     Neuromuscular Wil:10       mins  79576;    Therapeutic Activity:         mins  17005;     Gait Trainin     mins  77544;     Ultrasound:         mins  60381;    Electrical Stimulation:        mins  37407 ( );  Dry Needling         mins self-pay    Timed Treatment:   46   mins   Total Treatment:     60   mins    Caroline Vasquez, PT  Physical Therapist  [place] License # [id number]

## 2018-08-22 ENCOUNTER — OFFICE VISIT (OUTPATIENT)
Dept: PHYSICAL THERAPY | Facility: CLINIC | Age: 68
End: 2018-08-22

## 2018-08-22 DIAGNOSIS — M25.661 KNEE JOINT STIFFNESS, BILATERAL: ICD-10-CM

## 2018-08-22 DIAGNOSIS — M25.662 KNEE JOINT STIFFNESS, BILATERAL: ICD-10-CM

## 2018-08-22 DIAGNOSIS — R26.9 GAIT DISTURBANCE: Primary | ICD-10-CM

## 2018-08-22 DIAGNOSIS — R29.898 WEAKNESS OF BOTH LEGS: ICD-10-CM

## 2018-08-22 PROCEDURE — 97112 NEUROMUSCULAR REEDUCATION: CPT | Performed by: PHYSICAL THERAPIST

## 2018-08-22 PROCEDURE — 97140 MANUAL THERAPY 1/> REGIONS: CPT | Performed by: PHYSICAL THERAPIST

## 2018-08-22 PROCEDURE — 97110 THERAPEUTIC EXERCISES: CPT | Performed by: PHYSICAL THERAPIST

## 2018-08-23 ENCOUNTER — TELEPHONE (OUTPATIENT)
Dept: INTERNAL MEDICINE | Age: 68
End: 2018-08-23

## 2018-08-23 DIAGNOSIS — R60.9 EDEMA, UNSPECIFIED TYPE: Primary | ICD-10-CM

## 2018-08-23 NOTE — TELEPHONE ENCOUNTER
Pt is requesting a referral for the [facility] re: extreme swelling in the legs.    Pls advise.    Pt can be reached #[phone].

## 2018-08-24 ENCOUNTER — OFFICE VISIT (OUTPATIENT)
Dept: PHYSICAL THERAPY | Facility: CLINIC | Age: 68
End: 2018-08-24

## 2018-08-24 DIAGNOSIS — M25.662 KNEE JOINT STIFFNESS, BILATERAL: ICD-10-CM

## 2018-08-24 DIAGNOSIS — R29.898 WEAKNESS OF BOTH LEGS: ICD-10-CM

## 2018-08-24 DIAGNOSIS — R26.9 GAIT DISTURBANCE: Primary | ICD-10-CM

## 2018-08-24 DIAGNOSIS — M25.661 KNEE JOINT STIFFNESS, BILATERAL: ICD-10-CM

## 2018-08-24 PROCEDURE — 97140 MANUAL THERAPY 1/> REGIONS: CPT | Performed by: PHYSICAL THERAPIST

## 2018-08-24 PROCEDURE — 97112 NEUROMUSCULAR REEDUCATION: CPT | Performed by: PHYSICAL THERAPIST

## 2018-08-24 PROCEDURE — 97110 THERAPEUTIC EXERCISES: CPT | Performed by: PHYSICAL THERAPIST

## 2018-08-27 ENCOUNTER — OFFICE VISIT (OUTPATIENT)
Dept: PHYSICAL THERAPY | Facility: CLINIC | Age: 68
End: 2018-08-27

## 2018-08-27 DIAGNOSIS — R29.898 WEAKNESS OF BOTH LEGS: ICD-10-CM

## 2018-08-27 DIAGNOSIS — R26.9 GAIT DISTURBANCE: Primary | ICD-10-CM

## 2018-08-27 DIAGNOSIS — M25.661 KNEE JOINT STIFFNESS, BILATERAL: ICD-10-CM

## 2018-08-27 DIAGNOSIS — M25.662 KNEE JOINT STIFFNESS, BILATERAL: ICD-10-CM

## 2018-08-27 PROCEDURE — 97110 THERAPEUTIC EXERCISES: CPT | Performed by: PHYSICAL THERAPIST

## 2018-08-27 PROCEDURE — 97530 THERAPEUTIC ACTIVITIES: CPT | Performed by: PHYSICAL THERAPIST

## 2018-08-27 PROCEDURE — 97112 NEUROMUSCULAR REEDUCATION: CPT | Performed by: PHYSICAL THERAPIST

## 2018-08-27 NOTE — PROGRESS NOTES
"Physical Therapy Daily Progress Note    Time In 0825  Time Out 0920    Jose Hurtado reports: felt like the \"massage\" on my kneecap really helped last time.  Kneecap feels more mobile states pt.  Reports he experienced a couple of episodes of LOB over weekend but denies falling.    Subjective     Objective       Ambulation     Observational Gait     Additional Observational Gait Details  Presents with noted ambulation in which bilateral knees remain slightly flexed along with some increased kyphosis of upper trunk.  Corrects with verbal and tactile cues     See Exercise, Manual, and Modality Logs for complete treatment.   Added: wobble board fwd and side/side 2 min each directions  Walking with 4-8 lbs up/down walking track  Unilateral stance R LE with 3 way balance activities  Encouraged to continue stretches at home for quad and hamstring musculature.          Assessment/Plan  Noted increased difficulty with low julieta activities today as pt felt that his balance was off with performance.  Exhibited deviation of trunk from midline while attempting to maintain balance with julieta stepping today.  Able to perform balance board without LOB but did require hand rail to hold onto. Improved superior glide of patella R LE and subjectively reports symptom improvement with manual techniques.    Progress per Plan of Care toward all goals           Manual Therapy:  15       mins  68201;  Therapeutic Exercise:   20      mins  82991;     Neuromuscular Wil:    8   mins  89748;    Therapeutic Activity:         mins  70660;     Gait Training:           mins  41958;     Ultrasound:          mins  83678;    Electrical Stimulation:         mins  26514 ( );      Timed Treatment: 43    mins   Total Treatment:     55   mins    Rey Sterling, PTA    Physical Therapist Assistant [place] 1181    "

## 2018-08-29 ENCOUNTER — OFFICE VISIT (OUTPATIENT)
Dept: PHYSICAL THERAPY | Facility: CLINIC | Age: 68
End: 2018-08-29

## 2018-08-29 DIAGNOSIS — R26.9 GAIT DISTURBANCE: Primary | ICD-10-CM

## 2018-08-29 DIAGNOSIS — M25.662 KNEE JOINT STIFFNESS, BILATERAL: ICD-10-CM

## 2018-08-29 DIAGNOSIS — M25.661 KNEE JOINT STIFFNESS, BILATERAL: ICD-10-CM

## 2018-08-29 DIAGNOSIS — R29.898 WEAKNESS OF BOTH LEGS: ICD-10-CM

## 2018-08-29 PROCEDURE — 97140 MANUAL THERAPY 1/> REGIONS: CPT | Performed by: PHYSICAL THERAPIST

## 2018-08-29 PROCEDURE — 97112 NEUROMUSCULAR REEDUCATION: CPT | Performed by: PHYSICAL THERAPIST

## 2018-08-29 PROCEDURE — 97110 THERAPEUTIC EXERCISES: CPT | Performed by: PHYSICAL THERAPIST

## 2018-08-30 LAB — INR PPP: 2.7 (ref 2–3)

## 2018-08-31 ENCOUNTER — TREATMENT (OUTPATIENT)
Dept: PHYSICAL THERAPY | Facility: CLINIC | Age: 68
End: 2018-08-31

## 2018-08-31 DIAGNOSIS — R29.898 WEAKNESS OF BOTH LEGS: ICD-10-CM

## 2018-08-31 DIAGNOSIS — R26.9 GAIT DISTURBANCE: Primary | ICD-10-CM

## 2018-08-31 DIAGNOSIS — M25.662 KNEE JOINT STIFFNESS, BILATERAL: ICD-10-CM

## 2018-08-31 DIAGNOSIS — M25.661 KNEE JOINT STIFFNESS, BILATERAL: ICD-10-CM

## 2018-08-31 PROCEDURE — G8978 MOBILITY CURRENT STATUS: HCPCS | Performed by: PHYSICAL THERAPIST

## 2018-08-31 PROCEDURE — 97110 THERAPEUTIC EXERCISES: CPT | Performed by: PHYSICAL THERAPIST

## 2018-08-31 PROCEDURE — 97140 MANUAL THERAPY 1/> REGIONS: CPT | Performed by: PHYSICAL THERAPIST

## 2018-08-31 PROCEDURE — 97530 THERAPEUTIC ACTIVITIES: CPT | Performed by: PHYSICAL THERAPIST

## 2018-08-31 PROCEDURE — G8979 MOBILITY GOAL STATUS: HCPCS | Performed by: PHYSICAL THERAPIST

## 2018-09-04 ENCOUNTER — ANTICOAGULATION VISIT (OUTPATIENT)
Dept: INTERNAL MEDICINE | Age: 68
End: 2018-09-04

## 2018-09-04 ENCOUNTER — OFFICE VISIT (OUTPATIENT)
Dept: ONCOLOGY | Facility: CLINIC | Age: 68
End: 2018-09-04

## 2018-09-04 ENCOUNTER — LAB (OUTPATIENT)
Dept: OTHER | Facility: HOSPITAL | Age: 68
End: 2018-09-04

## 2018-09-04 VITALS
RESPIRATION RATE: 18 BRPM | HEART RATE: 66 BPM | WEIGHT: 233 LBS | TEMPERATURE: 97.7 F | HEIGHT: 77 IN | DIASTOLIC BLOOD PRESSURE: 64 MMHG | OXYGEN SATURATION: 100 % | BODY MASS INDEX: 27.51 KG/M2 | SYSTOLIC BLOOD PRESSURE: 138 MMHG

## 2018-09-04 DIAGNOSIS — C15.9 ADENOCARCINOMA OF ESOPHAGUS (HCC): Primary | ICD-10-CM

## 2018-09-04 DIAGNOSIS — C15.9 ADENOCARCINOMA OF ESOPHAGUS (HCC): ICD-10-CM

## 2018-09-04 LAB
ALBUMIN SERPL-MCNC: 4 G/DL (ref 3.5–5.2)
ALBUMIN/GLOB SERPL: 1.3 G/DL
ALP SERPL-CCNC: 129 U/L (ref 39–117)
ALT SERPL W P-5'-P-CCNC: 18 U/L (ref 1–41)
ANION GAP SERPL CALCULATED.3IONS-SCNC: 9.2 MMOL/L
AST SERPL-CCNC: 23 U/L (ref 1–40)
BASOPHILS # BLD AUTO: 0.02 10*3/MM3 (ref 0–0.2)
BASOPHILS NFR BLD AUTO: 0.6 % (ref 0–1.5)
BILIRUB SERPL-MCNC: 0.3 MG/DL (ref 0.1–1.2)
BUN BLD-MCNC: 30 MG/DL (ref 8–23)
BUN/CREAT SERPL: 26.3 (ref 7–25)
CALCIUM SPEC-SCNC: 9.2 MG/DL (ref 8.6–10.5)
CHLORIDE SERPL-SCNC: 108 MMOL/L (ref 98–107)
CO2 SERPL-SCNC: 27.8 MMOL/L (ref 22–29)
CREAT BLD-MCNC: 1.14 MG/DL (ref 0.76–1.27)
DEPRECATED RDW RBC AUTO: 54.5 FL (ref 37–54)
EOSINOPHIL # BLD AUTO: 0.21 10*3/MM3 (ref 0–0.7)
EOSINOPHIL NFR BLD AUTO: 6.7 % (ref 0.3–6.2)
ERYTHROCYTE [DISTWIDTH] IN BLOOD BY AUTOMATED COUNT: 15.9 % (ref 11.5–14.5)
GFR SERPL CREATININE-BSD FRML MDRD: 64 ML/MIN/1.73
GLOBULIN UR ELPH-MCNC: 3.1 GM/DL
GLUCOSE BLD-MCNC: 66 MG/DL (ref 65–99)
HCT VFR BLD AUTO: 34.4 % (ref 40.4–52.2)
HGB BLD-MCNC: 10.9 G/DL (ref 13.7–17.6)
IMM GRANULOCYTES # BLD: 0 10*3/MM3 (ref 0–0.03)
IMM GRANULOCYTES NFR BLD: 0 % (ref 0–0.5)
LYMPHOCYTES # BLD AUTO: 0.99 10*3/MM3 (ref 0.9–4.8)
LYMPHOCYTES NFR BLD AUTO: 31.5 % (ref 19.6–45.3)
MCH RBC QN AUTO: 29.7 PG (ref 27–32.7)
MCHC RBC AUTO-ENTMCNC: 31.7 G/DL (ref 32.6–36.4)
MCV RBC AUTO: 93.7 FL (ref 79.8–96.2)
MONOCYTES # BLD AUTO: 0.35 10*3/MM3 (ref 0.2–1.2)
MONOCYTES NFR BLD AUTO: 11.1 % (ref 5–12)
NEUTROPHILS # BLD AUTO: 1.57 10*3/MM3 (ref 1.9–8.1)
NEUTROPHILS NFR BLD AUTO: 50.1 % (ref 42.7–76)
NRBC BLD MANUAL-RTO: 0 /100 WBC (ref 0–0)
PLATELET # BLD AUTO: 140 10*3/MM3 (ref 140–500)
PMV BLD AUTO: 8.6 FL (ref 6–12)
POTASSIUM BLD-SCNC: 4.2 MMOL/L (ref 3.5–5.2)
PROT SERPL-MCNC: 7.1 G/DL (ref 6–8.5)
RBC # BLD AUTO: 3.67 10*6/MM3 (ref 4.6–6)
SODIUM BLD-SCNC: 145 MMOL/L (ref 136–145)
WBC NRBC COR # BLD: 3.14 10*3/MM3 (ref 4.5–10.7)

## 2018-09-04 PROCEDURE — 99214 OFFICE O/P EST MOD 30 MIN: CPT | Performed by: INTERNAL MEDICINE

## 2018-09-04 PROCEDURE — 80053 COMPREHEN METABOLIC PANEL: CPT | Performed by: INTERNAL MEDICINE

## 2018-09-04 PROCEDURE — 36415 COLL VENOUS BLD VENIPUNCTURE: CPT

## 2018-09-04 PROCEDURE — 85025 COMPLETE CBC W/AUTO DIFF WBC: CPT | Performed by: INTERNAL MEDICINE

## 2018-09-04 NOTE — PROGRESS NOTES
Subjective .     REASONS FOR FOLLOWUP:  Esophageal cancer, cytopenias     HISTORY OF PRESENT ILLNESS:  The patient is a 68 y.o. year old male  who is here for follow-up with the above-mentioned history.    Denies pain or problems swallowing.  Has lost 12 pounds with effort.  He wants to lose at least another 10 pounds.    Currently still undergoing physical therapy since right knee replacement.    Past Medical History:   Diagnosis Date   • Anemia    • Anti-phospholipid syndrome (CMS/HCC)     On lifelong anticoagulation therapy   • Arthritis     RIGHT KNEE   • Bladder disorder     LEAKAGE   ON MED  wers pads   • Community acquired pneumonia     HISTORY OF IN 2014   • Deep venous thrombosis (CMS/HCC) 2006, 2008    Left lower extremity multiple   • Depression    • Esophageal carcinoma (CMS/HCC) 12/31/2014    had chemo and radiation prior surgery   • Fatigue    • Hammer toe of left foot     had surgery   • Hemorrhoids    • HH (hiatus hernia)    • History of atrial fibrillation 2015    ONE EPISODE WHILE HOSPITALIZED   • History of kidney stones    • History of nephrolithiasis    • History of pancreatitis     PT STATES MANY YEARS AGO   • History of radiation therapy    • Hypertension    • Long-term (current) use of anticoagulants, INR goal 2.0-3.0    • Malignant neoplasm of prostate (CMS/HCC)    • Restless legs syndrome    • Squamous 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   • COLONOSCOPY N/A 6/13/2017    Procedure: COLONOSCOPY TO CECUM AND INTO TERMINAL ILEUM;  Surgeon: Mulugeta BLACKWELL MD;  Location: [facility];  Service:    • CYSTOSCOPY W/ LASER LITHOTRIPSY     • ENDOSCOPY N/A 6/13/2017    Procedure:  ESOPHAGOGASTRODUODENOSCOPY WITH COLD BIOPSY;  Surgeon: Laek Gonzalez MD;  Location: [facility] ENDOSCOPY;  Service:    • ESOPHAGECTOMY      April 2015, stage IIB esophageal carcinoma, sub-total resection.   • ESOPHAGECTOMY      Esophagectomy Subtotal [place] [facility] Procedure   • EXCISION LESION  08/2012    Removal of Squamous Cell CA on Head   • HAMMER TOE REPAIR  09/2014    Hammertoe Operation (Each Toe), 10/2014   • HAMMER TOE REPAIR Left 10/3/2017    Procedure: Left second third and fourth distal interphalangeal joint resection with flexor tenotomy;  Surgeon: Mulugeta Lira MD;  Location:  [facility];  Service:    • HERNIA REPAIR      incisional   • JEJUNOSTOMY      Laparoscopic   • JEJUNOSTOMY      tube removal    • KNEE SURGERY Bilateral 1967, 1973, 1981   • KNEE SURGERY Right 03/26/2018    TKR   • PATELLA SURGERY Left     removed   • PILONIDAL CYST / SINUS EXCISION     • WI TOTAL KNEE ARTHROPLASTY Right 3/26/2018    Procedure: TOTAL KNEE ARTHROPLASTY;  Surgeon: Renny Solis MD;  Location: [facility];  Service: Orthopedics   • PROSTATECTOMY  2010   • PYLOROPLASTY     • SPINAL FUSION  02/1998    C 5,6   • TONSILLECTOMY  1955   • TONSILLECTOMY     • UPPER GASTROINTESTINAL ENDOSCOPY  12/15/2014    LA Grade D esophagitis, Pardo's, HH, multiple duodenal ulcers   • UPPER GASTROINTESTINAL ENDOSCOPY      (Therapeutic)   • VENTRAL/INCISIONAL HERNIA REPAIR N/A 4/14/2016    Procedure: VENTRAL/INCISIONAL HERNIA REPAIR, open, with mesh, and component separation;  Surgeon: Darren Rivas MD;  Location: [facility];  Service:        HEMATOLOGIC/ONCOLOGIC HISTORY:  (History from previous dates can be found in the separate document.)    MEDICATIONS    Current Outpatient Prescriptions:   •  bumetanide (BUMEX) 2 MG tablet, Take 1 tablet by mouth Daily., Disp: 30 tablet, Rfl: 1  •  diphenhydrAMINE-acetaminophen (TYLENOL PM)  MG tablet per tablet, Take 2 tablets by mouth At Night As Needed., Disp:  , Rfl:   •  PARoxetine (PAXIL) 30 MG tablet, Take 1 tablet by mouth Every Night., Disp: 90 tablet, Rfl: 1  •  potassium chloride (K-DUR,KLOR-CON) 10 MEQ CR tablet, Take 1 tablet by mouth Daily., Disp: 30 tablet, Rfl: 1  •  pramipexole (MIRAPEX) 1.5 MG tablet, Take 2 tablets by mouth Every Night., Disp: 90 tablet, Rfl: 1  •  warfarin (COUMADIN) 5 MG tablet, TAKE ONE TABLET BY MOUTH DAILY - STOP 6 DAYS BEFORE SURGERY AND BRIDGE WITH LOVENOX, Disp: 30 tablet, Rfl: 1    ALLERGIES:   No Known Allergies    SOCIAL HISTORY:       Social History     Social History   • Marital status:      Spouse name: Lena   • Number of children: 0   • Years of education: College     Occupational History   •  Self-Employed   •  Retired     Social History Main Topics   • Smoking status: Former Smoker     Packs/day: 2.00     Years: 2.00     Types: Cigarettes     Quit date: 1974   • Smokeless tobacco: Never Used   • Alcohol use 1.8 oz/week     1 Glasses of wine, 1 Cans of beer, 1 Shots of liquor per week   • Drug use: No   • Sexual activity: Defer     Other Topics Concern   • Not on file     Social History Narrative    self-employed          FAMILY HISTORY:  Family History   Problem Relation Age of Onset   • Cerebral aneurysm Mother         cerebral artery aneurysm   • Prostate cancer Brother 68   • Anxiety disorder Father    • Malig Hyperthermia Neg Hx        REVIEW OF SYSTEMS:    Review of Systems   Constitutional: Negative for activity change.   HENT: Negative for nosebleeds and trouble swallowing.    Respiratory: Negative for shortness of breath and wheezing.    Cardiovascular: Negative for chest pain and palpitations.   Gastrointestinal: Negative for constipation, diarrhea and nausea.   Genitourinary: Negative for dysuria and hematuria.   Musculoskeletal: Negative for arthralgias and myalgias.   Neurological: Negative for seizures and syncope.   Hematological: Negative for adenopathy. Does not bruise/bleed easily.  "  Psychiatric/Behavioral: Negative for confusion.           Objective    Vitals:    09/04/18 0815   BP: 138/64   Pulse: 66   Resp: 18   Temp: 97.7 °F (36.5 °C)   TempSrc: Oral   SpO2: 100%   Weight: 106 kg (233 lb)   Height: 195.6 cm (77.01\")   PainSc: 0-No pain     Current Status 9/4/2018   ECOG score 0      PHYSICAL EXAM:    CONSTITUTIONAL:  Vital signs reviewed.  No distress, looks comfortable.  EYES:  Conjunctiva and lids unremarkable.  PERRLA  EARS,NOSE,MOUTH,THROAT:  Ears and nose appear unremarkable.  Lips, teeth, gums appear unremarkable.  RESPIRATORY:  Normal respiratory effort.  Lungs clear to auscultation bilaterally.  CARDIOVASCULAR:  Normal S1, S2.  No murmurs rubs or gallops.  No significant lower extremity edema.  GASTROINTESTINAL: Abdomen appears unremarkable.  Nontender.  No hepatomegaly.  No splenomegaly.  NEURO: cranial nerves 2-12 grossly intact.  No focal deficits.  Appears to have equal strength all 4 extremities.  MUSCULOSKELETAL:  Unremarkable digits/nails.  No cyanosis or clubbing.  SKIN:  Warm.  No rashes.  PSYCHIATRIC:  Normal judgment and insight.  Normal mood and affect.     RECENT LABS:        WBC   Date/Time Value Ref Range Status   09/04/2018 08:05 AM 3.14 (L) 4.50 - 10.70 10*3/mm3 Final     Hemoglobin   Date/Time Value Ref Range Status   09/04/2018 08:05 AM 10.9 (L) 13.7 - 17.6 g/dL Final     Platelets   Date/Time Value Ref Range Status   09/04/2018 08:05  140 - 500 10*3/mm3 Final       Assessment/Plan     ASSESSMENT:  There are no diagnoses linked to this encounter.    *Esophageal adenocarcinoma. Initially T2N0M0. After neoadjuvant carboplatin/Taxol with radiation, achieved a pathologic CR at resection, 4/24/2015.   As per NCCN guidelines, plan CAT scans every 6 months x1 year, then every 6 to 9 months on years 2 and years 3. Defer any surveillance EGDs to Dr. Gonzalez if he feels they are appropriate.   Also as per NCCN guidelines, plan H and P every 3 to 6 months on years 1 and " years 2, then every 6 to 12 months' time on years 3 through years 5 and then annually.   Today I reviewed recent CT from 9/15/17 with him which shows no evidence of recurrence.  Images personally reviewed by me.  He appears to remain in remission.  EGD 6/13/17 by Dr. Gonzalez: No evidence of recurrence.  CT scan 3/2/18 (this completed 3 years of surveillance CT): No evidence of recurrence.  Plan no more surveillance CT.  Appears to remain in remission.    *Recurrent DVT, prior to cancer diagnosis.  Dr. George Mooney manages his Coumadin.      *Leukocytopenia, anemia, thrombocytopenia.    He had a white blood cell count in the upper 3s and a hemoglobin in the upper 12s with a normal platelet count prior to beginning chemotherapy. We will monitor this.   CBC relatively stable today..    *Persistent cough.  He has seen Dr. Maher Sayied for this.    He did not complain of this today.    *Emesis occurring 5 hours after eating on average once every month or 2.  No nausea otherwise.  Denies dysphagia or odynophagia.    He did not complain of this today.    PLAN:  · M.D. CBC 6 months  · No more surveillance CT scans.  · He does not have a port.    Wife assisted with history.

## 2018-09-05 ENCOUNTER — OFFICE VISIT (OUTPATIENT)
Dept: PHYSICAL THERAPY | Facility: CLINIC | Age: 68
End: 2018-09-05

## 2018-09-05 DIAGNOSIS — M25.661 KNEE JOINT STIFFNESS, BILATERAL: ICD-10-CM

## 2018-09-05 DIAGNOSIS — R26.9 GAIT DISTURBANCE: Primary | ICD-10-CM

## 2018-09-05 DIAGNOSIS — R29.898 WEAKNESS OF BOTH LEGS: ICD-10-CM

## 2018-09-05 DIAGNOSIS — M25.662 KNEE JOINT STIFFNESS, BILATERAL: ICD-10-CM

## 2018-09-05 PROCEDURE — 97110 THERAPEUTIC EXERCISES: CPT | Performed by: PHYSICAL THERAPIST

## 2018-09-05 PROCEDURE — 97140 MANUAL THERAPY 1/> REGIONS: CPT | Performed by: PHYSICAL THERAPIST

## 2018-09-05 PROCEDURE — 97112 NEUROMUSCULAR REEDUCATION: CPT | Performed by: PHYSICAL THERAPIST

## 2018-09-06 NOTE — PROGRESS NOTES
"Physical Therapy Daily Progress Note    Time In   Time Out     Jose Hurtado reports that he felt that his \"balance\" was off this weekend.    Subjective     Objective     -hamstring tightness noted bilateral LE   -increased edema/lymphedema noted bilateral LE's(primarily below knees L > R).  -ambulates with mildly increased knee flexion bilaterally.    See Exercise, Manual, and Modality Logs for complete treatment.   Added:  Standing bend and reach to 12 inch cone while holding onto foam roller x 10 reps each LE  Elliptical x 7 min          Assessment/Plan  Observed ambulating with mildly increased flexed knee positioning. Noted increased bilateral hamstring tightness but decreases post manual stretching.  Exhibits increased unsteadiness with low julieta, bend and reach and balance board with multiple episodes of unsteadiness to the left noted.      Progress per Plan of Care toward all goals.           Manual Therapy: 15      mins  84061;  Therapeutic Exercise:   20    mins  32009;     Neuromuscular Wil:  13    mins  23340;    Therapeutic Activity:          mins  40443;     Gait Training:         mins  95284;     Ultrasound:          mins  49305;    Electrical Stimulation:       mins  82535 ( );      Timed Treatment:   48   mins   Total Treatment:     48   mins    Rey Sterling PTA    Physical Therapist Assistant [place] [id number]    "

## 2018-09-07 ENCOUNTER — OFFICE VISIT (OUTPATIENT)
Dept: PHYSICAL THERAPY | Facility: CLINIC | Age: 68
End: 2018-09-07

## 2018-09-07 DIAGNOSIS — R29.898 WEAKNESS OF BOTH LEGS: ICD-10-CM

## 2018-09-07 DIAGNOSIS — M25.661 KNEE JOINT STIFFNESS, BILATERAL: ICD-10-CM

## 2018-09-07 DIAGNOSIS — M25.662 KNEE JOINT STIFFNESS, BILATERAL: ICD-10-CM

## 2018-09-07 DIAGNOSIS — R26.9 GAIT DISTURBANCE: Primary | ICD-10-CM

## 2018-09-07 PROCEDURE — 97140 MANUAL THERAPY 1/> REGIONS: CPT | Performed by: PHYSICAL THERAPIST

## 2018-09-07 PROCEDURE — 97110 THERAPEUTIC EXERCISES: CPT | Performed by: PHYSICAL THERAPIST

## 2018-09-07 PROCEDURE — 97112 NEUROMUSCULAR REEDUCATION: CPT | Performed by: PHYSICAL THERAPIST

## 2018-09-10 ENCOUNTER — OFFICE VISIT (OUTPATIENT)
Dept: PHYSICAL THERAPY | Facility: CLINIC | Age: 68
End: 2018-09-10

## 2018-09-10 DIAGNOSIS — R29.898 WEAKNESS OF BOTH LEGS: ICD-10-CM

## 2018-09-10 DIAGNOSIS — R26.9 GAIT DISTURBANCE: Primary | ICD-10-CM

## 2018-09-10 DIAGNOSIS — M25.661 KNEE JOINT STIFFNESS, BILATERAL: ICD-10-CM

## 2018-09-10 DIAGNOSIS — M25.662 KNEE JOINT STIFFNESS, BILATERAL: ICD-10-CM

## 2018-09-10 PROCEDURE — 97140 MANUAL THERAPY 1/> REGIONS: CPT | Performed by: PHYSICAL THERAPIST

## 2018-09-10 PROCEDURE — 97530 THERAPEUTIC ACTIVITIES: CPT | Performed by: PHYSICAL THERAPIST

## 2018-09-12 ENCOUNTER — OFFICE VISIT (OUTPATIENT)
Dept: PHYSICAL THERAPY | Facility: CLINIC | Age: 68
End: 2018-09-12

## 2018-09-12 DIAGNOSIS — R29.898 WEAKNESS OF BOTH LEGS: ICD-10-CM

## 2018-09-12 DIAGNOSIS — M25.662 KNEE JOINT STIFFNESS, BILATERAL: ICD-10-CM

## 2018-09-12 DIAGNOSIS — M25.661 KNEE JOINT STIFFNESS, BILATERAL: ICD-10-CM

## 2018-09-12 DIAGNOSIS — R26.9 GAIT DISTURBANCE: Primary | ICD-10-CM

## 2018-09-12 PROCEDURE — 97112 NEUROMUSCULAR REEDUCATION: CPT | Performed by: PHYSICAL THERAPIST

## 2018-09-12 PROCEDURE — 97140 MANUAL THERAPY 1/> REGIONS: CPT | Performed by: PHYSICAL THERAPIST

## 2018-09-12 PROCEDURE — 97110 THERAPEUTIC EXERCISES: CPT | Performed by: PHYSICAL THERAPIST

## 2018-09-13 ENCOUNTER — OFFICE VISIT (OUTPATIENT)
Dept: INTERNAL MEDICINE | Age: 68
End: 2018-09-13

## 2018-09-13 VITALS
OXYGEN SATURATION: 98 % | SYSTOLIC BLOOD PRESSURE: 115 MMHG | TEMPERATURE: 97.7 F | DIASTOLIC BLOOD PRESSURE: 60 MMHG | HEART RATE: 80 BPM | BODY MASS INDEX: 27.87 KG/M2 | WEIGHT: 236 LBS | HEIGHT: 77 IN

## 2018-09-13 DIAGNOSIS — Z23 ENCOUNTER FOR IMMUNIZATION: ICD-10-CM

## 2018-09-13 DIAGNOSIS — R60.0 LOCALIZED EDEMA: ICD-10-CM

## 2018-09-13 DIAGNOSIS — I10 ESSENTIAL HYPERTENSION: Primary | ICD-10-CM

## 2018-09-13 PROCEDURE — 90471 IMMUNIZATION ADMIN: CPT | Performed by: INTERNAL MEDICINE

## 2018-09-13 PROCEDURE — 99213 OFFICE O/P EST LOW 20 MIN: CPT | Performed by: INTERNAL MEDICINE

## 2018-09-13 PROCEDURE — 90632 HEPA VACCINE ADULT IM: CPT | Performed by: INTERNAL MEDICINE

## 2018-09-14 ENCOUNTER — OFFICE VISIT (OUTPATIENT)
Dept: PHYSICAL THERAPY | Facility: CLINIC | Age: 68
End: 2018-09-14

## 2018-09-14 ENCOUNTER — HOSPITAL ENCOUNTER (OUTPATIENT)
Dept: PHYSICAL THERAPY | Facility: HOSPITAL | Age: 68
Setting detail: THERAPIES SERIES
Discharge: HOME | End: 2018-09-14

## 2018-09-14 DIAGNOSIS — M25.661 KNEE JOINT STIFFNESS, BILATERAL: ICD-10-CM

## 2018-09-14 DIAGNOSIS — M25.662 KNEE JOINT STIFFNESS, BILATERAL: ICD-10-CM

## 2018-09-14 DIAGNOSIS — R29.898 WEAKNESS OF BOTH LEGS: ICD-10-CM

## 2018-09-14 DIAGNOSIS — I89.0 LYMPHEDEMA: Primary | ICD-10-CM

## 2018-09-14 DIAGNOSIS — R26.9 GAIT DISTURBANCE: Primary | ICD-10-CM

## 2018-09-14 PROCEDURE — 97116 GAIT TRAINING THERAPY: CPT | Performed by: PHYSICAL THERAPIST

## 2018-09-14 PROCEDURE — 97112 NEUROMUSCULAR REEDUCATION: CPT | Performed by: PHYSICAL THERAPIST

## 2018-09-14 PROCEDURE — 97162 PT EVAL MOD COMPLEX 30 MIN: CPT

## 2018-09-14 PROCEDURE — G8978 MOBILITY CURRENT STATUS: HCPCS

## 2018-09-14 PROCEDURE — 97110 THERAPEUTIC EXERCISES: CPT | Performed by: PHYSICAL THERAPIST

## 2018-09-14 PROCEDURE — G8979 MOBILITY GOAL STATUS: HCPCS

## 2018-09-14 NOTE — PROGRESS NOTES
Physical Therapy Daily Progress Note    Time In  Time Out     Jose Hurtado reports: that he has been focusing on walking at home while holding hand weights and feels that he is improving in regards to posture and walking.  Balance still feels off but going to lymphedema clinic this afternoon which hopefully will help.    Subjective     Objective   See Exercise, Manual, and Modality Logs for complete treatment.   Timed walking up/down hallway ~ 200 ft   Trial 1 35.78 sec (prior to exercise performance)  Trial 2 35.63 sec   Trial 3 32.09 sec (post exercise performance)    Added sit to stands with 12.5 lb kettle bell x 10       Assessment/Plan  Improved gait in regards to step/stride length and jf.  Improved ambulation time trials in hallway versus last testing(32 sec vs 43 sec).  Continues to benefit from postural cues to stand up straight.  L LE balance remains altered with increased lymphedema as possible culprit.    Progress per Plan of Care toward all goals           Manual Therapy:         mins  32200;  Therapeutic Exercise:    20     mins  70079;     Neuromuscular Wil:   10   mins  48868;    Therapeutic Activity:        mins  32437;     Gait Training:      10     mins  44159;     Ultrasound:          mins  56529;    Electrical Stimulation:       mins  99557 ( );      Timed Treatment:   40  mins   Total Treatment:    40  mins    Rey Sterling PTA    Physical Therapist Assistant [place] [phone]

## 2018-09-14 NOTE — THERAPY EVALUATION
Physical Therapy Lymphedema Initial Evaluation  [facility]     Patient Name: Jose Hurtado  : 1950  MRN: [id number]  Today's Date: 2018      Visit Date: 2018    Visit Dx:    ICD-10-CM ICD-9-CM   1. Lymphedema I89.0 457.1       Patient Active Problem List   Diagnosis   • Adenocarcinoma of esophagus (CMS/HCC)   • Adenomatous polyp of colon   • Anemia   • Duodenal ulcer   • Insomnia   • Malignant neoplasm of prostate (CMS/HCC)   • RLS (restless legs syndrome)   • Thrombophlebitis of deep veins of lower extremity (CMS/HCC)   • Ventral hernia without obstruction or gangrene   • Incisional hernia, without obstruction or gangrene   • Hernia, incisional   • Osteoarthrosis, localized, primary, knee   • BPH (benign prostatic hyperplasia)   • Stasis dermatitis   • Medicare annual wellness visit, subsequent   • Reactive depression   • DVT (deep venous thrombosis) (CMS/HCC)   • Hammer toes of both feet   • Other hammer toe(s) (acquired), left foot   • Status post left foot surgery   • Surgery follow-up-Left second third and fourth distal interphalangeal joint resection with flexor tenotomy - Left   • Other hyperlipidemia   • Primary osteoarthritis of right knee   • OA (osteoarthritis) of knee   • Hypertension   • Anti-phospholipid syndrome (CMS/HCC)        Past Medical History:   Diagnosis Date   • Anemia    • Anti-phospholipid syndrome (CMS/HCC)     On lifelong anticoagulation therapy   • Arthritis     RIGHT KNEE   • Bladder disorder     LEAKAGE   ON MED  wers pads   • Community acquired pneumonia     HISTORY OF IN    • Deep venous thrombosis (CMS/HCC) ,     Left lower extremity multiple   • Depression    • Esophageal carcinoma (CMS/HCC) 2014    had chemo and radiation prior surgery   • Fatigue    • Hammer toe of left foot     had surgery   • Hemorrhoids    • HH (hiatus hernia)    • History of atrial fibrillation 2015    ONE EPISODE WHILE HOSPITALIZED   • History of kidney stones    •  History of nephrolithiasis    • History of pancreatitis     PT STATES MANY YEARS AGO   • History of radiation therapy    • Hypertension    • Long-term (current) use of anticoagulants, INR goal 2.0-3.0    • Malignant neoplasm of prostate (CMS/HCC)    • Restless legs syndrome    • Squamous 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   • COLONOSCOPY N/A 6/13/2017    Procedure: COLONOSCOPY TO CECUM AND INTO TERMINAL ILEUM;  Surgeon: Mulugeta BLACKWELL MD;  Location: [facility] ENDOSCOPY;  Service:    • CYSTOSCOPY W/ LASER LITHOTRIPSY     • ENDOSCOPY N/A 6/13/2017    Procedure: ESOPHAGOGASTRODUODENOSCOPY WITH COLD BIOPSY;  Surgeon: Lake Gonzalez MD;  Location: [facility] ENDOSCOPY;  Service:    • ESOPHAGECTOMY      April 2015, stage IIB esophageal carcinoma, sub-total resection.   • ESOPHAGECTOMY      Esophagectomy Subtotal [place] Joe Procedure   • EXCISION LESION  08/2012    Removal of Squamous Cell CA on Head   • HAMMER TOE REPAIR  09/2014    Hammertoe Operation (Each Toe), 10/2014   • HAMMER TOE REPAIR Left 10/3/2017    Procedure: Left second third and fourth distal interphalangeal joint resection with flexor tenotomy;  Surgeon: Mulugeta Lira MD;  Location: [facility] OR OSC;  Service:    • HERNIA REPAIR      incisional   • JEJUNOSTOMY      Laparoscopic   • JEJUNOSTOMY      tube removal    • KNEE SURGERY Bilateral 1967, 1973, 1981   • KNEE SURGERY Right 03/26/2018    TKR   • PATELLA SURGERY Left     removed   • PILONIDAL CYST / SINUS EXCISION     • LA TOTAL KNEE ARTHROPLASTY Right 3/26/2018    Procedure: TOTAL KNEE ARTHROPLASTY;  Surgeon: Renny Solis MD;  Location: [facility] MAIN OR;  Service: Orthopedics   •  PROSTATECTOMY  2010   • PYLOROPLASTY     • SPINAL FUSION  02/1998    C 5,6   • TONSILLECTOMY  1955   • TONSILLECTOMY     • UPPER GASTROINTESTINAL ENDOSCOPY  12/15/2014    LA Grade D esophagitis, Pardo's, HH, multiple duodenal ulcers   • UPPER GASTROINTESTINAL ENDOSCOPY      (Therapeutic)   • VENTRAL/INCISIONAL HERNIA REPAIR N/A 4/14/2016    Procedure: VENTRAL/INCISIONAL HERNIA REPAIR, open, with mesh, and component separation;  Surgeon: Darren Rivas MD;  Location: [facility] OR;  Service:        Visit Dx:    ICD-10-CM ICD-9-CM   1. Lymphedema I89.0 457.1             Patient History     Row Name 09/14/18 1500             History    Chief Complaint Swelling  -PC      Date Current Problem(s) Began 03/26/18  -PC      Brief Description of Current Complaint Pt began having swelling in his legs in 2003 after a DVT in left leg. He was treated here in Dec 2013/Jan 2014.  Has been wearing compression stockings.  Diagnosed with esophageal Cancer 12/2014, underwent chemo and radiation, then an esophagectomy April 2015. While in  hospital developed pneumonia, difficulty swallowing, and heart arrythmia.  He had a feeding tube for 11 weeks. In April 2016 he had surgery for a hernia in his chest incision. In March 2018 he had a R TKR and his swelling became much worse.  He has been getting therapy for his gait problems.  -PC      Previous treatment for THIS PROBLEM Other (comment)   Treated here in 12/13-1/14  -PC      Patient/Caregiver Goals Decrease swelling;Improve mobility  -PC         Pain     Pain Location Knee  -PC      Pain at Present 2  -PC      Pain at Best 0  -PC      Pain at Worst 2  -PC      Difficulties with ADL's? Swelling adds to his gait difficulties  -PC         Fall Risk Assessment    Any falls in the past year: No  -PC         Services    Are you currently receiving Home Health services No  -PC         Daily Activities    Primary Language English  -PC      How does patient learn best? Reading  -PC       Teaching needs identified Management of Condition  -PC      Patient is concerned about/has problems with Walking;Performing home management (household chores, shopping, care of dependents)  -PC      Does patient have problems with the following? Depression  -PC      Barriers to learning None  -PC      Functional Status mobility issues preventing performance of daily activities  -PC      Pt Participated in POC and Goals Yes  -PC         Safety    Are you being hurt, hit, or frightened by anyone at home or in your life? No  -PC      Are you being neglected by a caregiver No  -PC        User Key  (r) = Recorded By, (t) = Taken By, (c) = Cosigned By    Initials Name Provider Type    Gogo Carroll, PT Physical Therapist                Lymphedema     Row Name 09/14/18 1600             Subjective Pain    Pre-Treatment Pain Level 2  -PC      Post-Treatment Pain Level 2  -PC      Subjective Pain Comment Pain is in knee, no pain in lower legs from the swelling.  -PC         Subjective Comments    Subjective Comments States the stockings controlled his swelling well until he had the TKR surgery.  Thinks he will need stronger stockings this time.  -PC         Lymphedema Assessment    Lymphedema Classification RLE:;LLE:;secondary;stage 2 (Spontaneously Irreversible)  -PC      Lymphedema Surgery Comments Prostatectomy 2010, esophagectomy 4/2015  -PC      Chemo Received yes  -PC      Chemo Treatments #/Timeframe 5 times  -PC      Radiation Therapy Received yes  -PC      Radiation Treatments #/Timeframe 26  -PC      Infections or Cellulitis? no  -PC         Lymphedema Edema Assessment    Ptting Edema Category By grade out of 4  -PC      Pitting Edema + 3/4  -PC      Edema Assessment Comment Pt presents with mod edema in left lower leg, min in right lower leg.  -PC         Skin Changes/Observations    Skin Observations Comment skin is intact, pt has spider veins on feet  -PC         Lymphedema Sensation    Lymphedema  Sensation Tests light touch  -PC      Lymphedema Light Touch WNL  -PC         Lymphedema Measurements    Measurement Type(s) Circumferential  -PC      Circumferential Areas Lower extremities  -PC         BLE Circumferential (cm)    Measurement Location 1 Knee  -PC      Left 1 42.5 cm  -PC      Right 1 44.8 cm  -PC      Measurement Location 2 10cm below knee  -PC      Left 2 39.1 cm  -PC      Right 2 38 cm  -PC      Measurement Location 3 20cm below knee  -PC      Left 3 46 cm  -PC      Right 3 41.2 cm  -PC      Measurement Location 4 30cm below knee  -PC      Left 4 38.2 cm  -PC      Right 4 35.4 cm  -PC      Measurement Location 5 Ankle  -PC      Left 5 30 cm  -PC      Right 5 26 cm  -PC      Measurement Location 6 Midfoot  -PC      Left 6 26.8 cm  -PC      Right 6 25 cm  -PC      LLE Circumferential Total 222.6 cm  -PC      RLE Circumferential Total 210.4 cm  -PC        User Key  (r) = Recorded By, (t) = Taken By, (c) = Cosigned By    Initials Name Provider Type    Gogo Carroll PT Physical Therapist                  PT Ortho     Row Name 09/14/18 1600       Posture/Observations    Posture/Observations Comments Pt ambulates into clinic slowly and with effort.    -PC       General ROM    GENERAL ROM COMMENTS Left toes and ankle limited by edema  -PC      User Key  (r) = Recorded By, (t) = Taken By, (c) = Cosigned By    Initials Name Provider Type    Gogo Carroll, PT Physical Therapist                    Therapy Education  Education Details: Reviewed treatment program and plan of care.  Given: Other (comment)  Program: Reinforced  How Provided: Verbal  Provided to: Patient  Level of Understanding: Verbalized            Exercises     Row Name 09/14/18 1600             Subjective Comments    Subjective Comments States the stockings controlled his swelling well until he had the TKR surgery.  Thinks he will need stronger stockings this time.  -PC         Subjective Pain    Pre-Treatment Pain Level 2  -PC       Post-Treatment Pain Level 2  -PC      Subjective Pain Comment Pain is in knee, no pain in lower legs from the swelling.  -PC        User Key  (r) = Recorded By, (t) = Taken By, (c) = Cosigned By    Initials Name Provider Type    PC Gogo Mei, PT Physical Therapist                              PT OP Goals     Row Name 09/14/18 1600          PT Short Term Goals    STG Date to Achieve 09/28/18  -PC     STG 1 Pt demo awareness of condition and precautions for improved prevention, management, care of symptoms, and ease of transition to self-care of condition.  -PC     STG 1 Progress New  -PC     STG 2 Pt/family independent with self-wrapping techniques of compression bandages as indicated for improved self-management of condition.  -PC     STG 2 Progress New  -PC     STG 3 Pt demo decreased net edema of >/=5-10cm for decreased edema symptoms, decreased risk of infection, and improved skin care.  -PC     STG 3 Progress New  -PC        Long Term Goals    LTG Date to Achieve 10/14/18  -PC     LTG 1 Pt/family independent with self care techniques for self-management of condition.  -PC     LTG 1 Progress New  -PC     LTG 2 Pt demo decreased net edema of >/=10-20cm for decreased edema symptoms, decreased risk of infection, and improved skin care.  -PC     LTG 2 Progress New  -PC     LTG 3 Pt/family independent with compression garments as indicated for self-management of condition.  -PC     LTG 3 Progress New  -PC        Time Calculation    PT Goal Re-Cert Due Date 12/14/18  -PC       User Key  (r) = Recorded By, (t) = Taken By, (c) = Cosigned By    Initials Name Provider Type    Gogo Carroll, PT Physical Therapist                PT Assessment/Plan     Row Name 09/14/18 1628          PT Assessment    Functional Limitations Impaired gait;Limitation in home management;Limitations in community activities;Performance in leisure activities  -PC     Impairments Edema;Impaired lymphatic circulation;Gait  -PC      Assessment Comments Pt is a 68 yr old male who presents with mod edema in LLE and min edema in RLE.  He has limited ROM of his left toes and ankles due to the edema. He has difficulty with gait which is made worse by the edema. Pt has a history of prostate cancer, esophageal cancer, DVT's, and R TKR.  Pt was previously treated in Lymphedema clinic  in [id number], and pt has worn compression stockings since then to control edema.  He was doing well until after his TKR surgery when his swelling became much worse. He is a good candidate for treatment.    -PC     Please refer to paper survey for additional self-reported information Yes  -PC     Rehab Potential Good  -PC     Patient/caregiver participated in establishment of treatment plan and goals Yes  -PC     Patient would benefit from skilled therapy intervention Yes  -PC        PT Plan    PT Frequency 5x/week  -PC     Predicted Duration of Therapy Intervention (Therapy Eval) 4 weeks  -PC     Planned CPT's? PT EVAL MOD COMPLELITY: 20073;PT MANUAL THERAPY EA 15 MIN: 75335;PT SELF CARE/HOME MGMT/TRAIN EA 15: 35889  -PC     Physical Therapy Interventions (Optional Details) bandaging;home exercise program;manual lymphatic drainage;patient/family education  -PC     PT Plan Comments See POC.  -PC       User Key  (r) = Recorded By, (t) = Taken By, (c) = Cosigned By    Initials Name Provider Type    PC Gogo Mei, PT Physical Therapist                       Time Calculation:   Start Time: 1420  Stop Time: 1450  Time Calculation (min): 30 min   Therapy Suggested Charges     Code   Minutes Charges    None           Therapy Charges for Today     Code Description Service Date Service Provider Modifiers Qty    58163529993 HC PT MOBILITY CURRENT 9/14/2018 Gogo Mei, PT GP, CJ 1    09824635061 HC PT MOBILITY PROJECTED 9/14/2018 Gogo Mei, PT GP, CI 1    88633829097 HC PT EVAL MOD COMPLEXITY 2 9/14/2018 Gogo Mei, PT KX, GP 1          PT G-Codes  PT  Professional Judgement Used?: Yes  Functional Limitation: Mobility: Walking and moving around  Mobility: Walking and Moving Around Current Status (): At least 20 percent but less than 40 percent impaired, limited or restricted  Mobility: Walking and Moving Around Goal Status (): At least 1 percent but less than 20 percent impaired, limited or restricted         Gogo Mei, PT  9/14/2018

## 2018-09-16 NOTE — PROGRESS NOTES
Physical Therapy Daily Progress Note    Time In   Time Out    Jose Hurtado reports: balance feels off today.  Has noted that lymphedema in left leg is more persistent. Has appt for evaluation end of this week.    Subjective     Objective   See Exercise, Manual, and Modality Logs for complete treatment.       Assessment/Plan  Motivated with rehab efforts.  Does well with therapeutic exercise regimen(squats, cable walks, etc) but balance is off to the left today as noted with low julieta walks(clips julieta with left foot) and rocker board(requires more handhold/gripping to keep balance.    Other  Continue with gait, balance and LE strengthening activities as appropriate.           Manual Therapy:   15      mins  78239;  Therapeutic Exercise:    27  mins  43483;     Neuromuscular Wil:  10    mins  17392;    Therapeutic Activity:          mins  33311;     Gait Training:           mins  61831;     Ultrasound:          mins  11402;    Electrical Stimulation:       mins  36420 ( );      Timed Treatment: 52     mins   Total Treatment:   52   mins    Rey Sterling PTA    Physical Therapist Assistant [place] [id number]

## 2018-09-17 ENCOUNTER — OFFICE VISIT (OUTPATIENT)
Dept: PHYSICAL THERAPY | Facility: CLINIC | Age: 68
End: 2018-09-17

## 2018-09-17 DIAGNOSIS — M25.662 KNEE JOINT STIFFNESS, BILATERAL: ICD-10-CM

## 2018-09-17 DIAGNOSIS — R26.9 GAIT DISTURBANCE: Primary | ICD-10-CM

## 2018-09-17 DIAGNOSIS — M25.661 KNEE JOINT STIFFNESS, BILATERAL: ICD-10-CM

## 2018-09-17 DIAGNOSIS — R29.898 WEAKNESS OF BOTH LEGS: ICD-10-CM

## 2018-09-17 PROCEDURE — 97112 NEUROMUSCULAR REEDUCATION: CPT | Performed by: PHYSICAL THERAPIST

## 2018-09-17 PROCEDURE — 97140 MANUAL THERAPY 1/> REGIONS: CPT | Performed by: PHYSICAL THERAPIST

## 2018-09-19 ENCOUNTER — OFFICE VISIT (OUTPATIENT)
Dept: PHYSICAL THERAPY | Facility: CLINIC | Age: 68
End: 2018-09-19

## 2018-09-19 DIAGNOSIS — M25.661 KNEE JOINT STIFFNESS, BILATERAL: ICD-10-CM

## 2018-09-19 DIAGNOSIS — R29.898 WEAKNESS OF BOTH LEGS: ICD-10-CM

## 2018-09-19 DIAGNOSIS — M25.662 KNEE JOINT STIFFNESS, BILATERAL: ICD-10-CM

## 2018-09-19 DIAGNOSIS — R26.9 GAIT DISTURBANCE: Primary | ICD-10-CM

## 2018-09-19 PROCEDURE — 97112 NEUROMUSCULAR REEDUCATION: CPT | Performed by: PHYSICAL THERAPIST

## 2018-09-19 PROCEDURE — 97110 THERAPEUTIC EXERCISES: CPT | Performed by: PHYSICAL THERAPIST

## 2018-09-19 PROCEDURE — 97140 MANUAL THERAPY 1/> REGIONS: CPT | Performed by: PHYSICAL THERAPIST

## 2018-09-21 ENCOUNTER — OFFICE VISIT (OUTPATIENT)
Dept: PHYSICAL THERAPY | Facility: CLINIC | Age: 68
End: 2018-09-21

## 2018-09-21 DIAGNOSIS — M25.662 KNEE JOINT STIFFNESS, BILATERAL: ICD-10-CM

## 2018-09-21 DIAGNOSIS — R26.9 GAIT DISTURBANCE: Primary | ICD-10-CM

## 2018-09-21 DIAGNOSIS — M25.661 KNEE JOINT STIFFNESS, BILATERAL: ICD-10-CM

## 2018-09-21 DIAGNOSIS — R29.898 WEAKNESS OF BOTH LEGS: ICD-10-CM

## 2018-09-21 PROCEDURE — 97140 MANUAL THERAPY 1/> REGIONS: CPT | Performed by: PHYSICAL THERAPIST

## 2018-09-21 PROCEDURE — 97110 THERAPEUTIC EXERCISES: CPT | Performed by: PHYSICAL THERAPIST

## 2018-09-24 ENCOUNTER — OFFICE VISIT (OUTPATIENT)
Dept: PHYSICAL THERAPY | Facility: CLINIC | Age: 68
End: 2018-09-24

## 2018-09-24 DIAGNOSIS — M25.662 KNEE JOINT STIFFNESS, BILATERAL: ICD-10-CM

## 2018-09-24 DIAGNOSIS — R29.898 WEAKNESS OF BOTH LEGS: ICD-10-CM

## 2018-09-24 DIAGNOSIS — M25.661 KNEE JOINT STIFFNESS, BILATERAL: ICD-10-CM

## 2018-09-24 DIAGNOSIS — R26.9 GAIT DISTURBANCE: Primary | ICD-10-CM

## 2018-09-24 PROCEDURE — 97110 THERAPEUTIC EXERCISES: CPT | Performed by: PHYSICAL THERAPIST

## 2018-09-24 PROCEDURE — 97112 NEUROMUSCULAR REEDUCATION: CPT | Performed by: PHYSICAL THERAPIST

## 2018-09-26 ENCOUNTER — OFFICE VISIT (OUTPATIENT)
Dept: PHYSICAL THERAPY | Facility: CLINIC | Age: 68
End: 2018-09-26

## 2018-09-26 DIAGNOSIS — R29.898 WEAKNESS OF BOTH LEGS: ICD-10-CM

## 2018-09-26 DIAGNOSIS — R26.9 GAIT DISTURBANCE: Primary | ICD-10-CM

## 2018-09-26 DIAGNOSIS — M25.662 KNEE JOINT STIFFNESS, BILATERAL: ICD-10-CM

## 2018-09-26 DIAGNOSIS — M25.661 KNEE JOINT STIFFNESS, BILATERAL: ICD-10-CM

## 2018-09-26 PROCEDURE — 97110 THERAPEUTIC EXERCISES: CPT | Performed by: PHYSICAL THERAPIST

## 2018-09-26 PROCEDURE — 97140 MANUAL THERAPY 1/> REGIONS: CPT | Performed by: PHYSICAL THERAPIST

## 2018-09-27 ENCOUNTER — TREATMENT (OUTPATIENT)
Dept: PHYSICAL THERAPY | Facility: CLINIC | Age: 68
End: 2018-09-27

## 2018-09-27 DIAGNOSIS — R29.898 WEAKNESS OF BOTH LEGS: ICD-10-CM

## 2018-09-27 DIAGNOSIS — R26.9 GAIT DISTURBANCE: Primary | ICD-10-CM

## 2018-09-27 PROCEDURE — 97530 THERAPEUTIC ACTIVITIES: CPT | Performed by: PHYSICAL THERAPIST

## 2018-09-27 PROCEDURE — G8978 MOBILITY CURRENT STATUS: HCPCS | Performed by: PHYSICAL THERAPIST

## 2018-09-27 PROCEDURE — 97110 THERAPEUTIC EXERCISES: CPT | Performed by: PHYSICAL THERAPIST

## 2018-09-27 PROCEDURE — G8980 MOBILITY D/C STATUS: HCPCS | Performed by: PHYSICAL THERAPIST

## 2018-09-27 NOTE — PROGRESS NOTES
"Physical Therapy Daily Progress/ Discharge Note  20 treatments  Subjective     Jose Hurtado reports: he has seen good improvements over the past month in his gait, his LE strength, and some balance improvements. He reports that today will be his last visit and he feels (I) with all TE at this time. He would like to continue (I) of skilled therapy at this time.     LEFS: 55/80  49-64 20-39% CJ             Objective   See Exercise, Manual, and Modality Logs for complete treatment.     Active Range of Motion   Left Knee   Flexion: 120 degrees   Extensor la degrees      Right Knee   Flexion: 118 degrees   Extensor la degrees             Comments  Sit to stand (table height 23 inches) 5x STS time: 7.9 seconds  Patient continues to have increased valgus (B) (hip weakness) and has increased weight bearing on LLE.         Sitting to 18 inch height: 5x STS 14.8 seconds      3 weeks. Pt will:  1. Demonstrate improve R knee AROM extension to < 8 deg   2. Patient will perform squat from 22\" height with minimal weight shifting  3. Patient will Perform sit<>stand from chair with good control  4. Patient will be (I) with initial HEP for mobility and strengthening.      4/4 met    6 weeks. Pt will:  1. Demonstrate improved (B) hip MMT of >/= 4/5 globally   2. Report pain of </= 2/10 with all ADLs  3. LEFS >/= 50/80 showing a significant change in functional status.  4. Perform 5x sit to stand from 23\" height in < 15 seconds with good symmetry.   5. Improve knee extension AROM to < 5  deg short or extension AROM  6. Patient will report > 50 % improvement in ease of daily ambulation     4/6 met  Remaining goals were discussed with patient and are AFD             Assessment/Plan   All goals have been met or are adequate for discharge at this time. Patient has progressed well with PT POC. Patient has improved  strength, ROM, and stability along with improving gait and balance. He has shown reduction in fall risk and (I) safety " with ADL's. At this time patient is (I) with HEP and is appropriate to progress (I) of skilled therapy. Chart will be left open for 30 days in the case that patient would need to return for therapy under the current POC. After 30 days POC will be closed.       D/C to HEP           Manual Therapy:         mins  78359;  Therapeutic Exercise:    24     mins  78501;     Neuromuscular Wil:        mins  41734;    Therapeutic Activity:     16     mins  48060;     Gait Training:           mins  75102;     Ultrasound:          mins  85685;    Electrical Stimulation:         mins  53600 ( );  Dry Needling          mins self-pay    Timed Treatment:   40   mins   Total Treatment:     40   mins    Gonzalo Solis PT DPT  Physical Therapist  [place] License # [id number]

## 2018-09-28 LAB — INR PPP: 2.2 (ref 2–3)

## 2018-10-01 ENCOUNTER — ANTICOAGULATION VISIT (OUTPATIENT)
Dept: INTERNAL MEDICINE | Age: 68
End: 2018-10-01

## 2018-10-01 NOTE — PROGRESS NOTES
Physical Therapy Daily Progress Note    Time In   Time Out     Jose Hurtado reports: that he feels his right knee mobility is good and that balance/strength on that leg are good.  Left leg is the problem, especially in regards to balance and swelling.  States that he needs to get fluid out of leg before he can truly  deficits in left leg.    Subjective     Objective   See Exercise, Manual, and Modality Logs for complete treatment.     Performed therapeutic exercise activities consisting of:  Treadmill walking x 8 min fwd 1.0 - 1.4 mph.  Elliptical x 5 min  Partial squats 4 x 5 reps  Walking track ambulation with postural awareness while holding 8 lb hand weights.  Up/down 60 foot track.     Manual stretching B LE's affected musculature:  Hamstrings, ITB, pirformis and quads.    Assessment/Plan  Verbalizes importance of consistent exercise stretching performance and progression of balance/proprioceptive and strengthening activities with home program.  Working toward consistency in regards to regimen of exercise/activity on specific days to maximize performance.  Balance and gait L LE will benefit from lymphedema treatments.     Other  Finalize HEP activities for anticipated discharge this week.           Manual Therapy:  12    mins  89690;  Therapeutic Exercise:   23   mins  86050;     Neuromuscular Wil:        mins  85574;    Therapeutic Activity:        mins  32600;     Gait Training:         mins  81908;     Ultrasound:          mins  94236;    Electrical Stimulation:       mins  32023 ( );      Timed Treatment:35  mins   Total Treatment:  35   mins    Rey Sterling PTA    Physical Therapist Assistant [place] [address]

## 2018-10-18 ENCOUNTER — TELEPHONE (OUTPATIENT)
Dept: INTERNAL MEDICINE | Age: 68
End: 2018-10-18

## 2018-10-18 NOTE — TELEPHONE ENCOUNTER
Pt is complaining of difficulty breathing & soa. Pt states it does not occur all the time; however believes that scar tissue could be the issue in re: to the radiation he receives from his oncologist.    I spoke with DEIDRE Sr about this, this AM & was instructed to advise pt to seek his nearest ER for eval & tx due to they have drs on call that can assist the problem or issues he is having. Pt be came difficult & is now requesting a referral to see a pulmonologist.     Pls advise.    Pt can be reached #[phone].

## 2018-10-18 NOTE — TELEPHONE ENCOUNTER
Tried calling pt several times. Phone rings 1x & goes to voicemail. I left pt a detailed vm but yet to hear back from pt. MO

## 2018-10-19 ENCOUNTER — HOSPITAL ENCOUNTER (OUTPATIENT)
Dept: GENERAL RADIOLOGY | Facility: HOSPITAL | Age: 68
Discharge: HOME | End: 2018-10-19
Admitting: INTERNAL MEDICINE

## 2018-10-19 ENCOUNTER — OFFICE VISIT (OUTPATIENT)
Dept: INTERNAL MEDICINE | Age: 68
End: 2018-10-19

## 2018-10-19 ENCOUNTER — OFFICE VISIT (OUTPATIENT)
Dept: SLEEP MEDICINE | Facility: HOSPITAL | Age: 68
End: 2018-10-19
Attending: INTERNAL MEDICINE

## 2018-10-19 VITALS
HEART RATE: 67 BPM | OXYGEN SATURATION: 96 % | SYSTOLIC BLOOD PRESSURE: 140 MMHG | BODY MASS INDEX: 27.58 KG/M2 | HEIGHT: 77 IN | DIASTOLIC BLOOD PRESSURE: 67 MMHG | WEIGHT: 233.6 LBS

## 2018-10-19 VITALS
HEIGHT: 77 IN | WEIGHT: 233 LBS | TEMPERATURE: 95.8 F | HEART RATE: 82 BPM | BODY MASS INDEX: 27.51 KG/M2 | DIASTOLIC BLOOD PRESSURE: 80 MMHG | RESPIRATION RATE: 12 BRPM | SYSTOLIC BLOOD PRESSURE: 140 MMHG | OXYGEN SATURATION: 98 %

## 2018-10-19 DIAGNOSIS — G47.33 OSA (OBSTRUCTIVE SLEEP APNEA): ICD-10-CM

## 2018-10-19 DIAGNOSIS — G25.81 RESTLESS LEGS SYNDROME (RLS): ICD-10-CM

## 2018-10-19 DIAGNOSIS — R06.00 DYSPNEA, UNSPECIFIED TYPE: Primary | ICD-10-CM

## 2018-10-19 DIAGNOSIS — G47.00 PERSISTENT DISORDER OF INITIATING OR MAINTAINING SLEEP: ICD-10-CM

## 2018-10-19 DIAGNOSIS — G47.10 HYPERSOMNIA: Primary | ICD-10-CM

## 2018-10-19 DIAGNOSIS — I10 ESSENTIAL HYPERTENSION: ICD-10-CM

## 2018-10-19 PROCEDURE — 99214 OFFICE O/P EST MOD 30 MIN: CPT | Performed by: INTERNAL MEDICINE

## 2018-10-19 PROCEDURE — G0463 HOSPITAL OUTPT CLINIC VISIT: HCPCS

## 2018-10-19 PROCEDURE — 71046 X-RAY EXAM CHEST 2 VIEWS: CPT

## 2018-10-19 RX ORDER — ZOLPIDEM TARTRATE 5 MG/1
TABLET ORAL
Qty: 2 TABLET | Refills: 0 | Status: SHIPPED | OUTPATIENT
Start: 2018-10-19 | End: 2018-10-31

## 2018-10-19 NOTE — PROGRESS NOTES
[facility] SLEEP MEDICINE  [address] [facility]  [address] [facility] [address]  [phone]    Referring Physician: Dr. George Mooney  PCP: George Mooney MD    Reason for consult:  Sleep disorders of not sleeping well    Jose Hurtado is a 68 y.o.male was seen in the [facility] today. He reports difficulty with sleep maintenance, going on for many years. He sleeps from 10p to 6a. He wakes up tired and unrefreshed. He feels sleepy during the day. He naps during day - takes 2-3 naps of 20 mins each during day. He denies snoring or apneas. He is drowsy when he drives his car. He has RLS and takes Mirapex at night. He had an esophagectomy in April 2015 for cancer.    [place] Sleepiness Score: 14    SH-Caffeine intake 2-3 per day. Alcohol intake 2-3 per week.    Jose Hurtado  has a past medical history of Anemia; Anti-phospholipid syndrome (CMS/HCC); Arthritis; Bladder disorder; Community acquired pneumonia; Deep venous thrombosis (CMS/HCC) (2006, 2008); Depression; Esophageal carcinoma (CMS/HCC) (12/31/2014); Fatigue; Hammer toe of left foot; Hemorrhoids; HH (hiatus hernia); History of atrial fibrillation (2015); History of kidney stones; History of nephrolithiasis; History of pancreatitis; History of radiation therapy; Hypertension; Long-term (current) use of anticoagulants, INR goal 2.0-3.0; Malignant neoplasm of prostate (CMS/HCC); Restless legs syndrome; Squamous carcinoma; Stasis dermatitis; and Warfarin anticoagulation.     Current Medications:    Current Outpatient Prescriptions:   •  diphenhydrAMINE-acetaminophen (TYLENOL PM)  MG tablet per tablet, Take 2 tablets by mouth At Night As Needed., Disp: , Rfl:   •  PARoxetine (PAXIL) 30 MG tablet, Take 1 tablet by mouth Every Night., Disp: 90 tablet, Rfl: 1  •  pramipexole (MIRAPEX) 1.5 MG tablet, Take 2 tablets by mouth Every Night., Disp: 90 tablet, Rfl: 1  •  warfarin (COUMADIN) 5 MG tablet, TAKE ONE TABLET BY MOUTH DAILY - STOP 6  "DAYS BEFORE SURGERY AND BRIDGE WITH LOVENOX, Disp: 30 tablet, Rfl: 1  •  zolpidem (AMBIEN) 5 MG tablet, Bring to sleep lab DO NOT use at home. PLEASE take if not asleep within 30 mins of start of study., Disp: 2 tablet, Rfl: 0   also listed in Sleep Questionnaire.    FH: family history includes Anxiety disorder in his father; Cerebral aneurysm in his mother; Prostate cancer (age of onset: 68) in his brother.  SH:  reports that he quit smoking about 44 years ago. His smoking use included Cigarettes. He has a 4.00 pack-year smoking history. He has never used smokeless tobacco. He reports that he drinks about 1.8 oz of alcohol per week . He reports that he does not use drugs.    Pertinent Positive Review of Systems of frequent urination, painful joints, swelling ankles, depression, frequent heartburn, problems swallowing  Rest of Review of Systems was negative as recorded in Sleep Questionnaire.        Vital Signs: /67 (BP Location: Left arm, Patient Position: Sitting)   Pulse 67   Ht 195.6 cm (77\")   Wt 106 kg (233 lb 9.6 oz)   SpO2 96%   BMI 27.70 kg/m²     Body mass index is 27.7 kg/m².       Tongue: normal      Dentition: good       Pharynx: Posterior pharyngeal pillars are narrow   Mallampatti: III (soft and hard palate and base of uvula visible)        General: Alert. Cooperative. Well developed. No acute distress.             Head:  Normocephalic. Symmetrical. Atraumatic.              Eyes: Sclera clear. No icterus. PERRLA. Normal EOM.             Ears: No deformities. Normal hearing.             Nose: No septal deviation. No drainage.          Throat: No oral lesions. No thrush. Moist mucous membranes.    Chest Wall:  Normal shape. Symmetric expansion with respiration. No tenderness.             Neck:  Trachea midline.           Lungs:  Clear to auscultation bilaterally. No wheezes. No rhonchi. No rales. Respirations regular, even and unlabored.            Heart:  Regular rhythm and normal rate. " Normal S1 and S2. No murmur.     Abdomen:  Soft, non-tender and non-distended. Normal bowel sounds. No masses.  Extremities:  Moves all extremities well. edema.           Pulses: Pulses palpable and equal bilaterally.               Skin: Dry. Intact. No bleeding. No rash.           Neuro: Moves all 4 extremities and cranial nerves grossly intact.  Psychiatric: Normal mood and affect.    Impression:  1. Hypersomnia    2. Restless legs syndrome (RLS)    3. Persistent disorder of initiating or maintaining sleep    4. JAI (obstructive sleep apnea)        Plan:  Jose will be scheduled for PSG for evaluation of JAI.With prior esophagectomy I am concerned about GI causes for sleep disturbance as well. In-lab PSG therefore preferred. He has typical features of obstructive sleep apnea with hypersomnolence snoring and apneas along with elevated BMI and classic oropharyngeal structure.  Likelihood of obstructive sleep apnea is high and a polysomnogram study will therefore be requested.      Remains on Mirapex to help with RLS.    Some of the insomnia could be gi related or due to sdb or due to rls. PSG will help determine.    Possible diagnosis and pathophysiology of obstructive sleep apnea was discussed with the patient.  Health risks of untreated obstructive sleep apnea including cardiovascular risks were discussed.  Patient was cautioned about activities requiring full concentration especially driving at night or for longer distances, and until hypersomnolence is corrected.    Results of sleep testing will be reviewed to see if patient is a candidate for CPAP machine.  Alternatives to therapy include oral mandibular advancing device (OMAD) were discussed. However OMAD is usually only beneficial in mild obstructive sleep apnea.  The benefit of weight loss in reducing severity of obstructive sleep apnea was discussed.  Patient would benefit from adhering to a strict diet to achieve ideal BMI.     Patient will follow up in  this clinic after study.    Thank you for allowing me to participate in your patient's care.    Stephon Sommers MD    Part of this note may be an electronic transcription/translation of spoken language to printed text using the Dragon Dictation System.

## 2018-10-19 NOTE — PROGRESS NOTES
"  Jose Hurtado is a 68 y.o. male who presents with   Chief Complaint   Patient presents with   • Shortness of Breath   • Blood Pressure Check   .    68-year-old male.  Patient Dr. Mooney.  Presents with the complaint that since he had his right knee surgery in March he has been having off and on symptoms of a sensation that he \"cannot get full lungs of air\".  Also he has been having sensations of \"like someone sucking the air out of me\".  He denies any prior history of asthma or any lung problems that he knows of.  He is a former smoker.  He denies any relation of his symptoms to physical exertion or exercise of any type or any type of food intake.  He has had no fever or chills, he has had no cough or wheezing.  He has had a history of esophageal cancer in the past with radiation treatment and the radiation was given through the right lung area.  A CT scan of his chest in September 2017 showed a mild right pleural effusion but otherwise was negative.  His oxygen saturation on today's visit was 98% on room air which is consistent over the past several visits.  He is not visibly short of breath and does not appear to be in any distress.  He says basically the problem now is no worse then when it seemed to have begun in March.  He denies any calf swelling or tenderness.      Shortness of Breath   This is a chronic problem. The current episode started more than 1 month ago. The problem has been waxing and waning. Pertinent negatives include no abdominal pain, chest pain, claudication, fever, hemoptysis, leg pain, leg swelling, sputum production or wheezing. Nothing aggravates the symptoms. He has tried nothing for the symptoms.      Hypertension: He currently is on no blood pressure medication and today's blood pressure is 140/80.     The following portions of the patient's history were reviewed and updated as appropriate: allergies, current medications, past medical history and problem list.    Review of Systems "   Constitutional: Negative.  Negative for fever.   HENT: Negative.    Eyes: Negative.    Respiratory: Positive for shortness of breath. Negative for cough, hemoptysis, sputum production, choking, chest tightness and wheezing.    Cardiovascular: Negative.  Negative for chest pain, claudication and leg swelling.   Gastrointestinal: Negative for abdominal pain.   Genitourinary: Negative.    Musculoskeletal: Negative.    Skin: Negative.    Neurological: Negative.    Psychiatric/Behavioral: Negative.        Objective   Physical Exam   Constitutional: He is oriented to person, place, and time. He appears well-developed and well-nourished. No distress.   HENT:   Head: Normocephalic and atraumatic.   Eyes: Pupils are equal, round, and reactive to light. Conjunctivae and EOM are normal.   Neck: Normal range of motion. Neck supple. No thyromegaly present.   Neck exam negative.  Carotid auscultation normal-no bruits heard.   Cardiovascular: Normal rate, regular rhythm, normal heart sounds and intact distal pulses.  Exam reveals no gallop and no friction rub.    No murmur heard.  Pulmonary/Chest: Effort normal. No respiratory distress. He has no wheezes. He has no rales. He exhibits no tenderness.   Clinical exam of the chest reveals a right lateral thoracic scar from his previously mentioned esophageal surgery.  He does have diminished breath sounds on the right   Neurological: He is alert and oriented to person, place, and time.   Psychiatric: He has a normal mood and affect. His behavior is normal. Judgment and thought content normal.   Nursing note and vitals reviewed.      Assessment/Plan   Jose was seen today for shortness of breath and blood pressure check.    Diagnoses and all orders for this visit:    Dyspnea, unspecified type  -     XR Chest PA & Lateral    Essential hypertension        Plan: We'll send him for a plain chest x-ray today.  He is not in any distress and his own admission his problem is no worse now  than it was in March so it would appear that we have time to investigate and pursue this issue.  I have suggested to him that possibly pulmonary fibrosis from radiation may be a contributing factor to his shortness of breath or his symptoms that he cannot get a full breath of air into his lungs.  Also in the past he has seen pulmonary and perhaps a referral back to them may be in order.

## 2018-10-29 RX ORDER — WARFARIN SODIUM 5 MG/1
TABLET ORAL
Qty: 60 TABLET | Refills: 2 | Status: SHIPPED | OUTPATIENT
Start: 2018-10-29 | End: 2019-04-15 | Stop reason: SDUPTHER

## 2018-10-29 NOTE — PROGRESS NOTES
"Physical Therapy Daily Progress Note    Time In Time Out     Jose Hurtado reports: \" I still feel my walking is just not right\"    Subjective     Objective   See Exercise, Manual, and Modality Logs for complete treatment.     Manual therapy x 12 min B LE:  Passive stretching of ITB, hamstring, piriformis and quad    Neuromuscular re education consisting of balance board activities and Indo board activities as well as BOSU ball step ups with knee  and 5 inch julieta step overs.      Assessment/Plan  Progressing with exercise and activity without increased LE complaints.  Gait is improving but patient still reports gait as being \"not right\"    Progress per Plan of Care toward all goals           Manual Therapy: 12     mins  33484;  Therapeutic Exercise:       mins  03496;     Neuromuscular Wil: 15    mins  30384;    Therapeutic Activity:        mins  96307;     Gait Training:         mins  89320;     Ultrasound:        mins  33498;    Electrical Stimulation:       mins  85523 ( );      Timed Treatment:27   mins   Total Treatment:   27     mins    Rey Sterling PTA    Physical Therapist Assistant [place] [id number]    "

## 2018-10-31 ENCOUNTER — OFFICE VISIT (OUTPATIENT)
Dept: INTERNAL MEDICINE | Age: 68
End: 2018-10-31

## 2018-10-31 VITALS
HEIGHT: 77 IN | DIASTOLIC BLOOD PRESSURE: 70 MMHG | HEART RATE: 111 BPM | OXYGEN SATURATION: 93 % | TEMPERATURE: 97.6 F | BODY MASS INDEX: 27.51 KG/M2 | WEIGHT: 233 LBS | SYSTOLIC BLOOD PRESSURE: 128 MMHG

## 2018-10-31 DIAGNOSIS — R06.02 SHORTNESS OF BREATH: ICD-10-CM

## 2018-10-31 DIAGNOSIS — Z00.00 ROUTINE HEALTH MAINTENANCE: ICD-10-CM

## 2018-10-31 DIAGNOSIS — M79.89 LIMB SWELLING: ICD-10-CM

## 2018-10-31 DIAGNOSIS — Z01.30 BLOOD PRESSURE CHECK: Primary | ICD-10-CM

## 2018-10-31 PROCEDURE — 99214 OFFICE O/P EST MOD 30 MIN: CPT | Performed by: INTERNAL MEDICINE

## 2018-10-31 NOTE — PROGRESS NOTES
"[facility] INTERNAL MEDICINE  MD Jose VARGAS W Hurtado / 68 y.o. / male  10/31/2018      ASSESSMENT & PLAN:    Problem List Items Addressed This Visit     None      Visit Diagnoses     Blood pressure check    -  Primary    Limb swelling        Routine health maintenance        Shortness of breath            No orders of the defined types were placed in this encounter.      Summary/Discussion:    Number-one blood pressure check, no evidence of disease at this point, I see no need to start therapy at this time.  Would recommend blood pressure check in 4-6 months.    #2 left lower extremity swelling secondary to prior DVT.  Patient will be following up with lymphedema clinic.    #3 routine health maintenance, patient will be following up with Dr. Pandya as I leave the practice.    Number for dyspnea without any evidence of congestive heart failure on physical exam.  Symptoms are intermittent and may be related over the past year to his recent issues with worsening anemia.      Return in about 5 months (around 3/31/2019), or DR Pandya, for Blood pressure check.    ____________________________________________________________________    VITALS    Visit Vitals  /70   Pulse 111   Temp 97.6 °F (36.4 °C)   Ht 195.6 cm (77.01\")   Wt 106 kg (233 lb)   SpO2 93%   BMI 27.62 kg/m²       BP Readings from Last 3 Encounters:   10/31/18 128/70   10/19/18 140/67   10/19/18 140/80     Wt Readings from Last 3 Encounters:   10/31/18 106 kg (233 lb)   10/19/18 106 kg (233 lb 9.6 oz)   10/19/18 106 kg (233 lb)      Body mass index is 27.62 kg/m².    CC:  Main reason(s) for today's visit: Hypertension      HPI:     Patient presents this morning for blood pressure check.  In the past, he was treated with diuretic for edema.  That presumably His blood pressure in line. he does not typically use caffeine nor salt.  He does not monitor blood pressure the outpatient setting.  Family history is negative for hypertension.  Approximately " 10-15 pounds overweight. Next  visit with ophthalmology is in December.    He does have intentions of following up with the lymphedema clinic regarding persistent edema on the left lower extremity.  At this time, he would prefer to not restart the diarrhetic therapy.    Laboratory review September 2018 BMP acceptable CBC hemoglobin 10.9 (patient had a low-grade anemia for several years which is followed by hematology.  This most recent decrease was noted after his knee surgery in March 2018).  Last lipids May 2018 normal.    Patient Care Team:  George Mooney MD as PCP - General  Code, George THORPE II, MD as Consulting Physician (Hematology and Oncology)  Jose Inman MD as Consulting Physician (Urology)  Mulugeta Millan MD as Consulting Physician (Gastroenterology)  Seth Solomon MD as Consulting Physician (Cardiology)  Jose Christine III, MD as Referring Physician (Thoracic Surgery)  ____________________________________________________________________    REVIEW OF SYSTEMS    Review of Systems   Respiratory: Positive for shortness of breath. Negative for chest tightness.         Patient will have intermittent episodes of shortness of breath with minimal exertion.  He seemed to last about 30 minutes, not associated pain or wheezing.  Going on for the past year or so.  Last chest x-ray was done October of this year which is unremarkable.  There is no evidence of congestive heart failure.   Cardiovascular: Negative for chest pain and palpitations.   Neurological: Negative for dizziness, syncope, speech difficulty, weakness, light-headedness and headaches.         PHYSICAL EXAMINATION    Physical Exam   Constitutional: He is oriented to person, place, and time. He appears well-developed and well-nourished. No distress.   Cardiovascular: Normal rate, regular rhythm, normal heart sounds and intact distal pulses.  Exam reveals no gallop and no friction rub.    No murmur heard.  Pulmonary/Chest: Effort  normal and breath sounds normal. He has no wheezes. He has no rales.   Musculoskeletal: He exhibits edema.   edema still persistent in the left lower extremity which secondary to prior blood clot.   Neurological: He is alert and oriented to person, place, and time.   Skin: Skin is warm and dry. No rash noted.   Psychiatric: He has a normal mood and affect. His behavior is normal. Judgment and thought content normal.   Nursing note and vitals reviewed.        REVIEWED DATA:    Labs:     Lab Results   Component Value Date     09/04/2018    K 4.2 09/04/2018    AST 23 09/04/2018    ALT 18 09/04/2018    BUN 30 (H) 09/04/2018    CREATININE 1.14 09/04/2018    CREATININE 0.93 05/10/2018    CREATININE 1.16 03/15/2018    EGFRIFNONA 64 09/04/2018    EGFRIFAFRI 97 05/10/2018       Lab Results   Component Value Date    GLUCOSE 66 09/04/2018    GLUCOSE 108 (H) 03/15/2018    GLUCOSE 103 03/08/2018       Lab Results   Component Value Date    LDL 92 05/10/2018     (H) 01/30/2018    LDL 98 01/26/2017    HDL 45 05/10/2018    TRIG 79 05/10/2018       Lab Results   Component Value Date    TSH 3.29 04/28/2015       Lab Results   Component Value Date    WBC 3.14 (L) 09/04/2018    HGB 10.9 (L) 09/04/2018    HGB 9.0 (L) 03/30/2018    HGB 7.6 (L) 03/29/2018     09/04/2018       No results found for: PSA      Imaging:         Medical Tests:         Summary of old records / correspondence / consultant report:         Request outside records:         ALLERGIES  No Known Allergies     MEDICATIONS  Current Outpatient Prescriptions   Medication Sig Dispense Refill   • PARoxetine (PAXIL) 30 MG tablet Take 1 tablet by mouth Every Night. 90 tablet 1   • pramipexole (MIRAPEX) 1.5 MG tablet Take 2 tablets by mouth Every Night. 90 tablet 1   • warfarin (COUMADIN) 5 MG tablet TAKE ONE TABLET BY MOUTH DAILY - STOP 6 DAYS BEFORE SURGERY AND BRIDGE WITH LOVENOX 60 tablet 2     No current facility-administered medications for this visit.         PFSH:     The following portions of the patient's history were reviewed and updated as appropriate: Allergies / Current Medications / Past Medical History / Surgical History / Social History / Family History    PROBLEM LIST   Patient Active Problem List   Diagnosis   • Adenocarcinoma of esophagus (CMS/HCC)   • Adenomatous polyp of colon   • Anemia   • Duodenal ulcer   • Insomnia   • Malignant neoplasm of prostate (CMS/HCC)   • RLS (restless legs syndrome)   • Thrombophlebitis of deep veins of lower extremity (CMS/HCC)   • Ventral hernia without obstruction or gangrene   • Incisional hernia, without obstruction or gangrene   • Hernia, incisional   • Osteoarthrosis, localized, primary, knee   • BPH (benign prostatic hyperplasia)   • Stasis dermatitis   • Medicare annual wellness visit, subsequent   • Reactive depression   • DVT (deep venous thrombosis) (CMS/HCC)   • Hammer toes of both feet   • Other hammer toe(s) (acquired), left foot   • Status post left foot surgery   • Surgery follow-up-Left second third and fourth distal interphalangeal joint resection with flexor tenotomy - Left   • Other hyperlipidemia   • Primary osteoarthritis of right knee   • OA (osteoarthritis) of knee   • Hypertension   • Anti-phospholipid syndrome (CMS/HCC)       PAST MEDICAL HISTORY  Past Medical History:   Diagnosis Date   • Anemia    • Anti-phospholipid syndrome (CMS/HCC)     On lifelong anticoagulation therapy   • Arthritis     RIGHT KNEE   • Bladder disorder     LEAKAGE   ON MED  wers pads   • Community acquired pneumonia     HISTORY OF IN 2014   • Deep venous thrombosis (CMS/HCC) 2006, 2008    Left lower extremity multiple   • Depression    • Esophageal carcinoma (CMS/HCC) 12/31/2014    had chemo and radiation prior surgery   • Fatigue    • Hammer toe of left foot     had surgery   • Hemorrhoids    • HH (hiatus hernia)    • History of atrial fibrillation 2015    ONE EPISODE WHILE HOSPITALIZED   • History of kidney stones    •  History of nephrolithiasis    • History of pancreatitis     PT STATES MANY YEARS AGO   • History of radiation therapy    • Hypertension    • Long-term (current) use of anticoagulants, INR goal 2.0-3.0    • Malignant neoplasm of prostate (CMS/HCC)    • Restless legs syndrome    • Squamous carcinoma     on the head   • Stasis dermatitis    • Warfarin anticoagulation        SURGICAL HISTORY  Past Surgical History:   Procedure Laterality Date   • APPENDECTOMY  1950   • BRONCHOSCOPY      (Diagnostic)   • CATARACT EXTRACTION Bilateral 2014   • COLONOSCOPY  12/15/2014    Complete / Description: EH, IH, torts, stool, follow-up colonoscopy due in 5 years.   • COLONOSCOPY N/A 6/13/2017    Procedure: COLONOSCOPY TO CECUM AND INTO TERMINAL ILEUM;  Surgeon: Mulugeta BLACKWELL MD;  Location: [facility] ENDOSCOPY;  Service:    • CYSTOSCOPY W/ LASER LITHOTRIPSY     • ENDOSCOPY N/A 6/13/2017    Procedure: ESOPHAGOGASTRODUODENOSCOPY WITH COLD BIOPSY;  Surgeon: Lake Gonzalez MD;  Location: [facility] ENDOSCOPY;  Service:    • ESOPHAGECTOMY      April 2015, stage IIB esophageal carcinoma, sub-total resection.   • ESOPHAGECTOMY      Esophagectomy Subtotal Andrea Joe Procedure   • EXCISION LESION  08/2012    Removal of Squamous Cell CA on Head   • HAMMER TOE REPAIR  09/2014    Hammertoe Operation (Each Toe), 10/2014   • HAMMER TOE REPAIR Left 10/3/2017    Procedure: Left second third and fourth distal interphalangeal joint resection with flexor tenotomy;  Surgeon: Mulugeta Lira MD;  Location: [facility] OR OSC;  Service:    • HERNIA REPAIR      incisional   • JEJUNOSTOMY      Laparoscopic   • JEJUNOSTOMY      tube removal    • KNEE SURGERY Bilateral 1967, 1973, 1981   • KNEE SURGERY Right 03/26/2018    TKR   • PATELLA SURGERY Left     removed   • PILONIDAL CYST / SINUS EXCISION     • ND TOTAL KNEE ARTHROPLASTY Right 3/26/2018    Procedure: TOTAL KNEE ARTHROPLASTY;  Surgeon: Renny Solis MD;  Location: [facility] OR;  Service:  Orthopedics   • PROSTATECTOMY  2010   • PYLOROPLASTY     • SPINAL FUSION  02/1998    C 5,6   • TONSILLECTOMY  1955   • TONSILLECTOMY     • UPPER GASTROINTESTINAL ENDOSCOPY  12/15/2014    LA Grade D esophagitis, Pardo's, HH, multiple duodenal ulcers   • UPPER GASTROINTESTINAL ENDOSCOPY      (Therapeutic)   • VENTRAL/INCISIONAL HERNIA REPAIR N/A 4/14/2016    Procedure: VENTRAL/INCISIONAL HERNIA REPAIR, open, with mesh, and component separation;  Surgeon: Darren Rivas MD;  Location: [facility] OR;  Service:        SOCIAL HISTORY  Social History     Social History   • Marital status:      Spouse name: Lena   • Number of children: 0   • Years of education: College     Occupational History   •  Self-Employed   •  Retired     Social History Main Topics   • Smoking status: Former Smoker     Packs/day: 2.00     Years: 2.00     Types: Cigarettes     Quit date: 1974   • Smokeless tobacco: Never Used   • Alcohol use 1.8 oz/week     1 Glasses of wine, 1 Cans of beer, 1 Shots of liquor per week   • Drug use: No   • Sexual activity: Defer     Other Topics Concern   • Not on file     Social History Narrative    self-employed        FAMILY HISTORY  Family History   Problem Relation Age of Onset   • Cerebral aneurysm Mother         cerebral artery aneurysm   • Prostate cancer Brother 68   • Anxiety disorder Father    • Malig Hyperthermia Neg Hx        Health Maintenance Due   Topic   • ZOSTER VACCINE (2 of 3)       Overdue health maintenance interventions  reviewed with patient.    Dictated utilizing Dragon voice recognition software

## 2018-11-07 ENCOUNTER — HOSPITAL ENCOUNTER (OUTPATIENT)
Dept: SLEEP MEDICINE | Facility: HOSPITAL | Age: 68
Discharge: HOME | End: 2018-11-07
Attending: INTERNAL MEDICINE | Admitting: INTERNAL MEDICINE

## 2018-11-07 DIAGNOSIS — G47.33 OSA (OBSTRUCTIVE SLEEP APNEA): ICD-10-CM

## 2018-11-07 PROCEDURE — 95811 POLYSOM 6/>YRS CPAP 4/> PARM: CPT

## 2018-11-20 ENCOUNTER — OFFICE VISIT (OUTPATIENT)
Dept: ORTHOPEDIC SURGERY | Facility: CLINIC | Age: 68
End: 2018-11-20

## 2018-11-20 VITALS — TEMPERATURE: 98.3 F | HEIGHT: 76 IN | BODY MASS INDEX: 27.4 KG/M2 | WEIGHT: 225 LBS

## 2018-11-20 DIAGNOSIS — Z96.651 STATUS POST TOTAL RIGHT KNEE REPLACEMENT: Primary | ICD-10-CM

## 2018-11-20 PROCEDURE — 73562 X-RAY EXAM OF KNEE 3: CPT | Performed by: ORTHOPAEDIC SURGERY

## 2018-11-20 PROCEDURE — 99213 OFFICE O/P EST LOW 20 MIN: CPT | Performed by: ORTHOPAEDIC SURGERY

## 2018-11-20 NOTE — PROGRESS NOTES
"Patient: Jose Hurtado  YOB: 1950 68 y.o. male  Medical Record Number: [id number]    Chief Complaints:   Chief Complaint   Patient presents with   • Right Knee - Follow-up     SX TKA 03/26/18       History of Present Illness:Jose Hurtado is a 68 y.o. male who presents for follow-up of  Right total knee replacement.  He is about 6 months out from total knee and he still has some lateral soreness about the right knee tissue and he gets started tends to loosen up a little bit after he gets going.  He describes as aching pain about the lateral joint.  Overall his motion is doing well.    Allergies: No Known Allergies    Medications:   Current Outpatient Medications   Medication Sig Dispense Refill   • PARoxetine (PAXIL) 30 MG tablet Take 1 tablet by mouth Every Night. 90 tablet 1   • pramipexole (MIRAPEX) 1.5 MG tablet Take 2 tablets by mouth Every Night. 90 tablet 1   • warfarin (COUMADIN) 5 MG tablet TAKE ONE TABLET BY MOUTH DAILY - STOP 6 DAYS BEFORE SURGERY AND BRIDGE WITH LOVENOX 60 tablet 2     No current facility-administered medications for this visit.          The following portions of the patient's history were reviewed and updated as appropriate: allergies, current medications, past family history, past medical history, past social history, past surgical history and problem list.    Review of Systems:   A 14 point review of systems was performed. All systems negative except pertinent positives/negative listed in HPI above    Physical Exam:   Vitals:    11/20/18 0803   Temp: 98.3 °F (36.8 °C)   TempSrc: Temporal   Weight: 102 kg (225 lb)   Height: 193 cm (76\")       General: A and O x 3, ASA, NAD    SCLERA:    Normal    DENTITION:   Normal  Knee:  right    ALIGNMENT:     Neutral  ,   Patella  tracks  Midline without crepitance    GAIT:    Slight antalgic    SKIN:    Well healed midline incision, no erythema or fluctuance    RANGE OF MOTION:   0  -  120   DEG    STRENGTH:   5  / 5    LIGAMENTS: "    No varus / valgus instability.   No  Flexion   instability.       DISTAL PULSES:    Paplable    DISTAL SENSATION :   Intact    LYMPHATICS:     No   lymphadenopathy    OTHER:     No   Effusion      Calf soft / nontender ,   Negative Wendy's sign            Radiology:  Xrays 3views right knee(ap,lateral, sunrise) were ordered and reviewed for evaluation of knee pain demonstratinga well positioned knee replacement without evidence of wear, loosening or osteolysis  todays xrays were compared to previous xrays and demonstrate no change    Assessment/Plan:  Right lateral knee pain along the iliotibial band I'm going to prescribe him an anti-inflammatory gel which she can apply twice a day.  Repeat x-rays when he returns for his 1 year visit in April.  He can work on stretching the IT band as well.

## 2018-11-28 ENCOUNTER — TELEPHONE (OUTPATIENT)
Dept: SLEEP MEDICINE | Facility: HOSPITAL | Age: 68
End: 2018-11-28

## 2018-11-28 NOTE — TELEPHONE ENCOUNTER
Patient called back and tech went over study information. Patient confirmed [facility] and scheduled F/U appt. MAB

## 2018-11-28 NOTE — TELEPHONE ENCOUNTER
Tech called pts home #, no answer. Left vm informing pt to return call to go over study results and to go over DME choices and to schedule f/u appt. MAB

## 2018-12-03 ENCOUNTER — HOSPITAL ENCOUNTER (OUTPATIENT)
Dept: PHYSICAL THERAPY | Facility: HOSPITAL | Age: 68
Setting detail: THERAPIES SERIES
Discharge: HOME | End: 2018-12-03

## 2018-12-03 DIAGNOSIS — I89.0 LYMPHEDEMA: Primary | ICD-10-CM

## 2018-12-03 PROCEDURE — G8978 MOBILITY CURRENT STATUS: HCPCS

## 2018-12-03 PROCEDURE — 97140 MANUAL THERAPY 1/> REGIONS: CPT

## 2018-12-03 PROCEDURE — G8979 MOBILITY GOAL STATUS: HCPCS

## 2018-12-03 NOTE — THERAPY PROGRESS REPORT/RE-CERT
Physical Therapy Lymphedema Re-Assessment  [facility]     Patient Name: Jose Hurtado  : 1950  MRN: [id number]  Today's Date: 12/3/2018      Visit Date: 2018    Visit Dx:    ICD-10-CM ICD-9-CM   1. Lymphedema I89.0 457.1       Patient Active Problem List   Diagnosis   • Adenocarcinoma of esophagus (CMS/HCC)   • Adenomatous polyp of colon   • Anemia   • Duodenal ulcer   • Insomnia   • Malignant neoplasm of prostate (CMS/HCC)   • RLS (restless legs syndrome)   • Thrombophlebitis of deep veins of lower extremity (CMS/HCC)   • Ventral hernia without obstruction or gangrene   • Incisional hernia, without obstruction or gangrene   • Hernia, incisional   • Osteoarthrosis, localized, primary, knee   • BPH (benign prostatic hyperplasia)   • Stasis dermatitis   • Medicare annual wellness visit, subsequent   • Reactive depression   • DVT (deep venous thrombosis) (CMS/HCC)   • Hammer toes of both feet   • Other hammer toe(s) (acquired), left foot   • Status post left foot surgery   • Surgery follow-up-Left second third and fourth distal interphalangeal joint resection with flexor tenotomy - Left   • Other hyperlipidemia   • Primary osteoarthritis of right knee   • OA (osteoarthritis) of knee   • Hypertension   • Anti-phospholipid syndrome (CMS/HCC)        Past Medical History:   Diagnosis Date   • Anemia    • Anti-phospholipid syndrome (CMS/HCC)     On lifelong anticoagulation therapy   • Arthritis     RIGHT KNEE   • Bladder disorder     LEAKAGE   ON MED  wers pads   • Community acquired pneumonia     HISTORY OF IN    • Deep venous thrombosis (CMS/HCC) ,     Left lower extremity multiple   • Depression    • Esophageal carcinoma (CMS/HCC) 2014    had chemo and radiation prior surgery   • Fatigue    • Hammer toe of left foot     had surgery   • Hemorrhoids    • HH (hiatus hernia)    • History of atrial fibrillation 2015    ONE EPISODE WHILE HOSPITALIZED   • History of kidney stones    •  History of nephrolithiasis    • History of pancreatitis     PT STATES MANY YEARS AGO   • History of radiation therapy    • Hypertension    • Long-term (current) use of anticoagulants, INR goal 2.0-3.0    • Malignant neoplasm of prostate (CMS/HCC)    • Restless legs syndrome    • Squamous 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   • CYSTOSCOPY W/ LASER LITHOTRIPSY     • ESOPHAGECTOMY      April 2015, stage IIB esophageal carcinoma, sub-total resection.   • ESOPHAGECTOMY      Esophagectomy Subtotal [place] Procedure   • EXCISION LESION  08/2012    Removal of Squamous Cell CA on Head   • HAMMER TOE REPAIR  09/2014    Hammertoe Operation (Each Toe), 10/2014   • HERNIA REPAIR      incisional   • JEJUNOSTOMY      Laparoscopic   • JEJUNOSTOMY      tube removal    • KNEE SURGERY Bilateral 1967, 1973, 1981   • KNEE SURGERY Right 03/26/2018    TKR   • PATELLA SURGERY Left     removed   • PILONIDAL CYST / SINUS EXCISION     • PROSTATECTOMY  2010   • PYLOROPLASTY     • SPINAL FUSION  02/1998    C 5,6   • TONSILLECTOMY  1955   • TONSILLECTOMY     • UPPER GASTROINTESTINAL ENDOSCOPY  12/15/2014    LA Grade D esophagitis, Pardo's, HH, multiple duodenal ulcers   • UPPER GASTROINTESTINAL ENDOSCOPY      (Therapeutic)       Visit Dx:    ICD-10-CM ICD-9-CM   1. Lymphedema I89.0 457.1           Lymphedema     Row Name 12/03/18 1300             Subjective Pain    Able to rate subjective pain?  yes  -KD      Pre-Treatment Pain Level  2  -KD      Post-Treatment Pain Level  2  -KD      Subjective Pain Comment  knee pain  -KD         Subjective Comments    Subjective Comments  States he's planning to get stronger stockings this time.   -KD         Lymphedema Edema Assessment    Edema Assessment Comment  Pt presents with mod edema in left lower leg, min in right lower leg.  -KD         Skin Changes/Observations    Skin Observations Comment  Skin intact  -KD         Lymphedema Measurements    Measurement Type(s)  Circumferential  -KD      Circumferential Areas  Lower extremities  -KD         BLE Circumferential (cm)    Measurement Location 1  Knee  -KD      Left 1  43.5 cm  -KD      Right 1  45 cm  -KD      Measurement Location 2  10cm below knee  -KD      Left 2  41.5 cm  -KD      Right 2  39.5 cm  -KD      Measurement Location 3  20cm below knee  -KD      Left 3  45.5 cm  -KD      Right 3  42 cm  -KD      Measurement Location 4  30cm below knee  -KD      Left 4  41 cm  -KD      Right 4  35 cm  -KD      Measurement Location 5  Ankle  -KD      Left 5  31 cm  -KD      Right 5  29 cm  -KD      Measurement Location 6  Midfoot  -KD      Left 6  27.5 cm  -KD      Right 6  27 cm  -KD      LLE Circumferential Total  230 cm  -KD      RLE Circumferential Total  217.5 cm  -KD         Manual Lymphatic Drainage    Manual Lymphatic Drainage Comments  B inguinal, B LE's  -KD         Compression/Skin Care    Compression/Skin Care  skin care;wrapping location  -KD      Skin Care  moisturizing lotion applied Eucerin lotion  -KD      Wrapping Location  lower extremity  -KD      Wrapping Location LE  bilateral:;foot to knee;toes  -KD      Wrapping Comments  Tg9, Transleast on toes, Artiflex X 2, Short stretch bandages 1-8cm and 3-10cm.  -KD        User Key  (r) = Recorded By, (t) = Taken By, (c) = Cosigned By    Initials Name Provider Type    KD Ceci Ruby, PT Physical Therapist                          Therapy Education  Education Details: Reviewed treatment protocol, bandaging precautions.  Given: Bandaging/dressing change, Symptoms/condition management  Program: Reinforced  How Provided: Verbal  Provided to: Patient  Level of Understanding:  Verbalized      Exercises     Row Name 12/03/18 1328 12/03/18 1300          Subjective Comments    Subjective Comments  --  States he's planning to get stronger stockings this time.  -KD        Subjective Pain    Able to rate subjective pain?  --  yes  -KD     Pre-Treatment Pain Level  --  2  -KD     Post-Treatment Pain Level  --  2  -KD     Subjective Pain Comment  --  knee pain  -KD        Total Minutes    84330 - PT Manual Therapy Minutes  61  -KD  --       User Key  (r) = Recorded By, (t) = Taken By, (c) = Cosigned By    Initials Name Provider Type    KD Ceci Ruby, PT Physical Therapist                        PT OP Goals     Row Name 12/03/18 1300          PT Short Term Goals    STG Date to Achieve  12/17/18  -KD     STG 1  Pt demo awareness of condition and precautions for improved prevention, management, care of symptoms, and ease of transition to self-care of condition.  -KD     STG 1 Progress  New  -KD     STG 2  Pt/family independent with self-wrapping techniques of compression bandages as indicated for improved self-management of condition.  -KD     STG 2 Progress  New  -KD     STG 3  Pt demo decreased net edema of >/=5-10cm for decreased edema symptoms, decreased risk of infection, and improved skin care.  -KD     STG 3 Progress  New  -KD        Long Term Goals    LTG Date to Achieve  01/02/19  -KD     LTG 1  Pt/family independent with self care techniques for self-management of condition.  -KD     LTG 1 Progress  New  -KD     LTG 2  Pt demo decreased net edema of >/=10-20cm for decreased edema symptoms, decreased risk of infection, and improved skin care.  -KD     LTG 2 Progress  New  -KD     LTG 3  Pt/family independent with compression garments as indicated for self-management of condition.  -KD     LTG 3 Progress  New  -KD     LTG 5 Progress  -- not measured but improved  -KD        Time Calculation    PT Goal Re-Cert Due Date  12/14/18  -KD       User Key  (r) = Recorded By, (t) = Taken By, (c)  = Cosigned By    Initials Name Provider Type    Ceci Esposito PT Physical Therapist          PT Assessment/Plan     Row Name 12/03/18 1326          PT Assessment    Assessment Comments  Pt is beginning treatment today. Objective findings are per eval. Pt is expected to respond well to therapy.  -KD        PT Plan    PT Frequency  5x/week  -KD     Predicted Duration of Therapy Intervention (Therapy Eval)  4 weeks  -KD     PT Plan Comments  See pt per PT Plan.  -KD       User Key  (r) = Recorded By, (t) = Taken By, (c) = Cosigned By    Initials Name Provider Type    Ceci Esposito, PT Physical Therapist                       Time Calculation:   Start Time: 1058  Stop Time: 1159  Time Calculation (min): 61 min  Total Timed Code Minutes- PT: 61 minute(s)   Therapy Suggested Charges     Code   Minutes Charges    01546 (CPT®) Hc Pt Neuromusc Re Education Ea 15 Min      52174 (CPT®) Hc Pt Ther Proc Ea 15 Min      09999 (CPT®) Hc Gait Training Ea 15 Min      05926 (CPT®) Hc Pt Therapeutic Act Ea 15 Min      03236 (CPT®) Hc Pt Manual Therapy Ea 15 Min 61 4    06515 (CPT®) Hc Pt Ther Massage- Per 15 Min      37673 (CPT®) Hc Pt Iontophoresis Ea 15 Min      71062 (CPT®) Hc Pt Elec Stim Ea-Per 15 Min      24127 (CPT®) Hc Pt Ultrasound Ea 15 Min      76031 (CPT®) Hc Pt Self Care/Mgmt/Train Ea 15 Min      51800 (CPT®) Hc Pt Prosthetic (S) Train Initial Encounter, Each 15 Min      46949 (CPT®) Hc Orthotic(S) Mgmt/Train Initial Encounter, Each 15min      66155 (CPT®) Hc Pt Aquatic Therapy Ea 15 Min      44915 (CPT®) Hc Pt Orthotic(S)/Prosthetic(S) Encounter, Each 15 Min       (CPT®) Hc Pt Electrical Stim Unattended      Total  61 4        Therapy Charges for Today     Code Description Service Date Service Provider Modifiers Qty    94005198271 HC PT MOBILITY CURRENT 12/3/2018 Ceci Ruby, PT GP, CJ 1    21033214893 HC PT MOBILITY PROJECTED 12/3/2018 Ceci Ruby, PT GP, CI 1    [id number] HC PT MANUAL THERAPY  EA 15 MIN 12/3/2018 Ceci Ruby, PT GP 4          PT G-Codes  Functional Limitation: Mobility: Walking and moving around  Mobility: Walking and Moving Around Current Status (): At least 20 percent but less than 40 percent impaired, limited or restricted  Mobility: Walking and Moving Around Goal Status (): At least 1 percent but less than 20 percent impaired, limited or restricted         Ceci Ruby, MANDA  12/3/2018

## 2018-12-04 ENCOUNTER — HOSPITAL ENCOUNTER (OUTPATIENT)
Dept: PHYSICAL THERAPY | Facility: HOSPITAL | Age: 68
Setting detail: THERAPIES SERIES
Discharge: HOME | End: 2018-12-04

## 2018-12-04 DIAGNOSIS — I89.0 LYMPHEDEMA: Primary | ICD-10-CM

## 2018-12-04 PROCEDURE — 97140 MANUAL THERAPY 1/> REGIONS: CPT

## 2018-12-04 NOTE — THERAPY TREATMENT NOTE
Outpatient Physical Therapy Lymphedema Treatment Note  [facility]     Patient Name: Jose Hurtado  : 1950  MRN: [id number]  Today's Date: 2018        Visit Date: 2018    Visit Dx:    ICD-10-CM ICD-9-CM   1. Lymphedema I89.0 457.1       Patient Active Problem List   Diagnosis   • Adenocarcinoma of esophagus (CMS/HCC)   • Adenomatous polyp of colon   • Anemia   • Duodenal ulcer   • Insomnia   • Malignant neoplasm of prostate (CMS/HCC)   • RLS (restless legs syndrome)   • Thrombophlebitis of deep veins of lower extremity (CMS/HCC)   • Ventral hernia without obstruction or gangrene   • Incisional hernia, without obstruction or gangrene   • Hernia, incisional   • Osteoarthrosis, localized, primary, knee   • BPH (benign prostatic hyperplasia)   • Stasis dermatitis   • Medicare annual wellness visit, subsequent   • Reactive depression   • DVT (deep venous thrombosis) (CMS/HCC)   • Hammer toes of both feet   • Other hammer toe(s) (acquired), left foot   • Status post left foot surgery   • Surgery follow-up-Left second third and fourth distal interphalangeal joint resection with flexor tenotomy - Left   • Other hyperlipidemia   • Primary osteoarthritis of right knee   • OA (osteoarthritis) of knee   • Hypertension   • Anti-phospholipid syndrome (CMS/HCC)        Lymphedema     Row Name 18 1200 18 1300          Subjective Pain    Able to rate subjective pain?  yes  -KD  yes  -KD     Pre-Treatment Pain Level  2  -KD  2  -KD     Post-Treatment Pain Level  2  -KD  2  -KD     Subjective Pain Comment  knee pain  -KD  knee pain  -KD        Subjective Comments    Subjective Comments  States he tolerated the bandages without issues.  -KD  States he's planning to get stronger stockings this time.  -KD        Lymphedema Edema Assessment    Edema Assessment Comment  --  Pt presents with mod edema in left lower leg, min in right lower leg.  -KD        Skin Changes/Observations    Skin Observations  Comment  --  Skin intact  -KD        Lymphedema Measurements    Measurement Type(s)  --  Circumferential  -KD     Circumferential Areas  --  Lower extremities  -KD        BLE Circumferential (cm)    Measurement Location 1  --  Knee  -KD     Left 1  --  43.5 cm  -KD     Right 1  --  45 cm  -KD     Measurement Location 2  --  10cm below knee  -KD     Left 2  --  41.5 cm  -KD     Right 2  --  39.5 cm  -KD     Measurement Location 3  --  20cm below knee  -KD     Left 3  --  45.5 cm  -KD     Right 3  --  42 cm  -KD     Measurement Location 4  --  30cm below knee  -KD     Left 4  --  41 cm  -KD     Right 4  --  35 cm  -KD     Measurement Location 5  --  Ankle  -KD     Left 5  --  31 cm  -KD     Right 5  --  29 cm  -KD     Measurement Location 6  --  Midfoot  -KD     Left 6  --  27.5 cm  -KD     Right 6  --  27 cm  -KD     LLE Circumferential Total  --  230 cm  -KD     RLE Circumferential Total  --  217.5 cm  -KD        Manual Lymphatic Drainage    Manual Lymphatic Drainage Comments  B inguinal, B LE's  -KD  B inguinal, B LE's  -KD        Compression/Skin Care    Compression/Skin Care  skin care;wrapping location  -KD  skin care;wrapping location  -KD     Skin Care  moisturizing lotion applied Eucerin lotion  -KD  moisturizing lotion applied Eucerin lotion  -KD     Wrapping Location  lower extremity  -KD  lower extremity  -KD     Wrapping Location LE  bilateral:;foot to knee;toes  -KD  bilateral:;foot to knee;toes  -KD     Wrapping Comments  Tg9, Transleast on toes, Artiflex X 2, Short stretch bandages 1-8cm and 3-10cm.  -KD  Tg9, Transleast on toes, Artiflex X 2, Short stretch bandages 1-8cm and 3-10cm.  -KD       User Key  (r) = Recorded By, (t) = Taken By, (c) = Cosigned By    Initials Name Provider Type    KD Ceci Ruby, PT Physical Therapist                        PT Assessment/Plan     Row Name 12/04/18 1244 12/03/18 1326       PT Assessment    Assessment Comments  Tolerating bandages well thus far.  -KD  Pt is  beginning treatment today. Objective findings are per eval. Pt is expected to respond well to therapy.  -KD       PT Plan    PT Frequency  --  5x/week  -KD    Predicted Duration of Therapy Intervention (Therapy Eval)  --  4 weeks  -KD    PT Plan Comments  Cont  -KD  See pt per PT Plan.  -KD      User Key  (r) = Recorded By, (t) = Taken By, (c) = Cosigned By    Initials Name Provider Type    Ceci Esposito, PT Physical Therapist               Exercises     Row Name 12/04/18 1245 12/04/18 1200 12/03/18 1328       Subjective Comments    Subjective Comments  --  States he tolerated the bandages without issues.  -KD  --       Subjective Pain    Able to rate subjective pain?  --  yes  -KD  --    Pre-Treatment Pain Level  --  2  -KD  --    Post-Treatment Pain Level  --  2  -KD  --    Subjective Pain Comment  --  knee pain  -KD  --       Total Minutes    43164 - PT Manual Therapy Minutes  70  -KD  --  61  -KD    Row Name 12/03/18 1300             Subjective Comments    Subjective Comments  States he's planning to get stronger stockings this time.  -KD         Subjective Pain    Able to rate subjective pain?  yes  -KD      Pre-Treatment Pain Level  2  -KD      Post-Treatment Pain Level  2  -KD      Subjective Pain Comment  knee pain  -KD        User Key  (r) = Recorded By, (t) = Taken By, (c) = Cosigned By    Initials Name Provider Type    Ceci Esposito, PT Physical Therapist                        PT OP Goals     Row Name 12/03/18 1300          PT Short Term Goals    STG Date to Achieve  12/17/18  -KD     STG 1  Pt demo awareness of condition and precautions for improved prevention, management, care of symptoms, and ease of transition to self-care of condition.  -KD     STG 1 Progress  New  -KD     STG 2  Pt/family independent with self-wrapping techniques of compression bandages as indicated for improved self-management of condition.  -KD     STG 2 Progress  New  -KD     STG 3  Pt demo decreased net edema of  >/=5-10cm for decreased edema symptoms, decreased risk of infection, and improved skin care.  -KD     STG 3 Progress  New  -KD        Long Term Goals    LTG Date to Achieve  01/02/19  -KD     LTG 1  Pt/family independent with self care techniques for self-management of condition.  -KD     LTG 1 Progress  New  -KD     LTG 2  Pt demo decreased net edema of >/=10-20cm for decreased edema symptoms, decreased risk of infection, and improved skin care.  -KD     LTG 2 Progress  New  -KD     LTG 3  Pt/family independent with compression garments as indicated for self-management of condition.  -KD     LTG 3 Progress  New  -KD     LTG 5 Progress  -- not measured but improved  -KD        Time Calculation    PT Goal Re-Cert Due Date  12/14/18  -KD       User Key  (r) = Recorded By, (t) = Taken By, (c) = Cosigned By    Initials Name Provider Type    Ceci Esposito, PT Physical Therapist          Therapy Education  Education Details: Instructed pt in washing stockinette and toe bandages  Given: Bandaging/dressing change  Program: New  How Provided: Verbal  Provided to: Patient  Level of Understanding: Verbalized              Time Calculation:   Start Time: 0950  Stop Time: 1100  Time Calculation (min): 70 min  Total Timed Code Minutes- PT: 70 minute(s)   Therapy Suggested Charges     Code   Minutes Charges    88350 (CPT®) Hc Pt Neuromusc Re Education Ea 15 Min      24340 (CPT®) Hc Pt Ther Proc Ea 15 Min      28344 (CPT®) Hc Gait Training Ea 15 Min      20157 (CPT®) Hc Pt Therapeutic Act Ea 15 Min      71756 (CPT®) Hc Pt Manual Therapy Ea 15 Min 70 5    81956 (CPT®) Hc Pt Ther Massage- Per 15 Min      73727 (CPT®) Hc Pt Iontophoresis Ea 15 Min      19571 (CPT®) Hc Pt Elec Stim Ea-Per 15 Min      11895 (CPT®) Hc Pt Ultrasound Ea 15 Min      02843 (CPT®) Hc Pt Self Care/Mgmt/Train Ea 15 Min      63032 (CPT®) Hc Pt Prosthetic (S) Train Initial Encounter, Each 15 Min      73491 (CPT®) Hc Orthotic(S) Mgmt/Train Initial Encounter,  Each 15min      67697 (CPT®) Hc Pt Aquatic Therapy Ea 15 Min      59869 (CPT®) Hc Pt Orthotic(S)/Prosthetic(S) Encounter, Each 15 Min       (CPT®) Hc Pt Electrical Stim Unattended      Total  70 5        Therapy Charges for Today     Code Description Service Date Service Provider Modifiers Qty    78093025230 HC PT MANUAL THERAPY EA 15 MIN 12/4/2018 eCci Ruby, PT GP 5                    Ceci Ruby, PT  12/4/2018

## 2018-12-06 ENCOUNTER — HOSPITAL ENCOUNTER (OUTPATIENT)
Dept: PHYSICAL THERAPY | Facility: HOSPITAL | Age: 68
Setting detail: THERAPIES SERIES
Discharge: HOME | End: 2018-12-06

## 2018-12-06 DIAGNOSIS — I89.0 LYMPHEDEMA: Primary | ICD-10-CM

## 2018-12-06 LAB — INR PPP: 2.1 (ref 2–3)

## 2018-12-06 PROCEDURE — 97140 MANUAL THERAPY 1/> REGIONS: CPT

## 2018-12-06 RX ORDER — PRAMIPEXOLE DIHYDROCHLORIDE 1.5 MG/1
TABLET ORAL
Qty: 90 TABLET | Refills: 0 | Status: SHIPPED | OUTPATIENT
Start: 2018-12-06 | End: 2018-12-07 | Stop reason: DRUGHIGH

## 2018-12-06 NOTE — THERAPY TREATMENT NOTE
Outpatient Physical Therapy Lymphedema Treatment Note  [facility]     Patient Name: Jose Hurtado  : 1950  MRN: [id number]  Today's Date: 2018        Visit Date: 2018    Visit Dx:    ICD-10-CM ICD-9-CM   1. Lymphedema I89.0 457.1       Patient Active Problem List   Diagnosis   • Adenocarcinoma of esophagus (CMS/HCC)   • Adenomatous polyp of colon   • Anemia   • Duodenal ulcer   • Insomnia   • Malignant neoplasm of prostate (CMS/HCC)   • RLS (restless legs syndrome)   • Thrombophlebitis of deep veins of lower extremity (CMS/HCC)   • Ventral hernia without obstruction or gangrene   • Incisional hernia, without obstruction or gangrene   • Hernia, incisional   • Osteoarthrosis, localized, primary, knee   • BPH (benign prostatic hyperplasia)   • Stasis dermatitis   • Medicare annual wellness visit, subsequent   • Reactive depression   • DVT (deep venous thrombosis) (CMS/HCC)   • Hammer toes of both feet   • Other hammer toe(s) (acquired), left foot   • Status post left foot surgery   • Surgery follow-up-Left second third and fourth distal interphalangeal joint resection with flexor tenotomy - Left   • Other hyperlipidemia   • Primary osteoarthritis of right knee   • OA (osteoarthritis) of knee   • Hypertension   • Anti-phospholipid syndrome (CMS/HCC)        Lymphedema     Row Name 18 1200             Subjective Pain    Able to rate subjective pain?  yes  -KD      Pre-Treatment Pain Level  0  -KD      Post-Treatment Pain Level  0  -KD         Subjective Comments    Subjective Comments  No problems with the bandages.  -KD         Manual Lymphatic Drainage    Manual Lymphatic Drainage Comments  B inguinal, B LE's  -KD         Compression/Skin Care    Compression/Skin Care  skin care;wrapping location  -KD      Skin Care  moisturizing lotion applied Eucerin lotion  -KD      Wrapping Location  lower extremity  -KD      Wrapping Location LE  bilateral:;foot to knee;toes  -KD      Wrapping  Comments  Tg9, Transleast on toes, Artiflex X 2, Short stretch bandages 1-8cm and 3-10cm.  -KD        User Key  (r) = Recorded By, (t) = Taken By, (c) = Cosigned By    Initials Name Provider Type    Ceci Esposito, PT Physical Therapist                        PT Assessment/Plan     Row Name 12/06/18 1242          PT Assessment    Assessment Comments  Pt wants to try and leave bandages on until his appt on 12/10 but he was instructed in bandaging and given written instructions just in case his current bandages do not stay up that long.  -KD        PT Plan    PT Plan Comments  Cont.  -KD       User Key  (r) = Recorded By, (t) = Taken By, (c) = Cosigned By    Initials Name Provider Type    Ceci Esposito, MANDA Physical Therapist               Exercises     Row Name 12/06/18 1245 12/06/18 1200          Subjective Comments    Subjective Comments  --  No problems with the bandages.  -KD        Subjective Pain    Able to rate subjective pain?  --  yes  -KD     Pre-Treatment Pain Level  --  0  -KD     Post-Treatment Pain Level  --  0  -KD        Total Minutes    66258 - PT Manual Therapy Minutes  75  -KD  --       User Key  (r) = Recorded By, (t) = Taken By, (c) = Cosigned By    Initials Name Provider Type    Ceci Esposito, PT Physical Therapist                            Therapy Education  Education Details: Instructed pt in care of bandages and bandaging technique.  Given: Bandaging/dressing change  Program: New  How Provided: Verbal, Demonstration, Written  Provided to: Patient  Level of Understanding: Verbalized              Time Calculation:   Start Time: 1105  Stop Time: 1220  Time Calculation (min): 75 min  Total Timed Code Minutes- PT: 75 minute(s)   Therapy Suggested Charges     Code   Minutes Charges    34352 (CPT®) Hc Pt Neuromusc Re Education Ea 15 Min      86521 (CPT®) Hc Pt Ther Proc Ea 15 Min      92945 (CPT®) Hc Gait Training Ea 15 Min      71089 (CPT®) Hc Pt Therapeutic Act Ea 15 Min      42184  (CPT®) Hc Pt Manual Therapy Ea 15 Min 75 5    56278 (CPT®) Hc Pt Ther Massage- Per 15 Min      20753 (CPT®) Hc Pt Iontophoresis Ea 15 Min      10743 (CPT®) Hc Pt Elec Stim Ea-Per 15 Min      22473 (CPT®) Hc Pt Ultrasound Ea 15 Min      74706 (CPT®) Hc Pt Self Care/Mgmt/Train Ea 15 Min      66020 (CPT®) Hc Pt Prosthetic (S) Train Initial Encounter, Each 15 Min      49914 (CPT®) Hc Orthotic(S) Mgmt/Train Initial Encounter, Each 15min      69125 (CPT®) Hc Pt Aquatic Therapy Ea 15 Min      80753 (CPT®) Hc Pt Orthotic(S)/Prosthetic(S) Encounter, Each 15 Min       (CPT®) Hc Pt Electrical Stim Unattended      Total  75 5        Therapy Charges for Today     Code Description Service Date Service Provider Modifiers Qty    47794038475 HC PT MANUAL THERAPY EA 15 MIN 12/6/2018 Ceci Ruby, PT GP 5                    Ceci Ruby, PT  12/6/2018

## 2018-12-07 ENCOUNTER — TELEPHONE (OUTPATIENT)
Dept: INTERNAL MEDICINE | Age: 68
End: 2018-12-07

## 2018-12-07 ENCOUNTER — ANTICOAGULATION VISIT (OUTPATIENT)
Dept: INTERNAL MEDICINE | Age: 68
End: 2018-12-07

## 2018-12-07 RX ORDER — PRAMIPEXOLE DIHYDROCHLORIDE 1.5 MG/1
3 TABLET ORAL NIGHTLY
Qty: 180 TABLET | Refills: 1 | Status: SHIPPED | OUTPATIENT
Start: 2018-12-07 | End: 2018-12-07 | Stop reason: SDUPTHER

## 2018-12-07 RX ORDER — PRAMIPEXOLE DIHYDROCHLORIDE 1.5 MG/1
3 TABLET ORAL NIGHTLY
Qty: 28180 TABLET | Refills: 1 | Status: SHIPPED | OUTPATIENT
Start: 2018-12-07 | End: 2019-04-15

## 2018-12-07 NOTE — TELEPHONE ENCOUNTER
"pramipexole (MIRAPEX) 1.5 MG tablet    [facility] [address] [place], [place] - [address] AT [facility] - [phone] [facility] [phone]     Pt said Dr. Mooney changed the directions for this med to \"1 in am, 2 in pm\". He needs the script to be the new directions.    He indicated he is out of this med and needs it today.    Thank you  "

## 2018-12-10 ENCOUNTER — HOSPITAL ENCOUNTER (OUTPATIENT)
Dept: PHYSICAL THERAPY | Facility: HOSPITAL | Age: 68
Setting detail: THERAPIES SERIES
Discharge: HOME | End: 2018-12-10

## 2018-12-10 DIAGNOSIS — I89.0 LYMPHEDEMA: Primary | ICD-10-CM

## 2018-12-10 PROCEDURE — 97140 MANUAL THERAPY 1/> REGIONS: CPT

## 2018-12-10 NOTE — THERAPY TREATMENT NOTE
Outpatient Physical Therapy Lymphedema Treatment Note  [facility]     Patient Name: Jose Hurtado  : 1950  MRN: [id number]  Today's Date: 12/10/2018        Visit Date: 12/10/2018    Visit Dx:    ICD-10-CM ICD-9-CM   1. Lymphedema I89.0 457.1       Patient Active Problem List   Diagnosis   • Adenocarcinoma of esophagus (CMS/HCC)   • Adenomatous polyp of colon   • Anemia   • Duodenal ulcer   • Insomnia   • Malignant neoplasm of prostate (CMS/HCC)   • RLS (restless legs syndrome)   • Thrombophlebitis of deep veins of lower extremity (CMS/HCC)   • Ventral hernia without obstruction or gangrene   • Incisional hernia, without obstruction or gangrene   • Hernia, incisional   • Osteoarthrosis, localized, primary, knee   • BPH (benign prostatic hyperplasia)   • Stasis dermatitis   • Medicare annual wellness visit, subsequent   • Reactive depression   • DVT (deep venous thrombosis) (CMS/HCC)   • Hammer toes of both feet   • Other hammer toe(s) (acquired), left foot   • Status post left foot surgery   • Surgery follow-up-Left second third and fourth distal interphalangeal joint resection with flexor tenotomy - Left   • Other hyperlipidemia   • Primary osteoarthritis of right knee   • OA (osteoarthritis) of knee   • Hypertension   • Anti-phospholipid syndrome (CMS/HCC)        Lymphedema     Row Name 12/10/18 1500             Subjective Pain    Able to rate subjective pain?  yes  -PC      Pre-Treatment Pain Level  0  -PC      Post-Treatment Pain Level  0  -PC         Subjective Comments    Subjective Comments  States he was able to keep the bandages on over the weekend. States his right leg has gone down, but the left leg has been the worst.  -PC         Skin Changes/Observations    Skin Observations Comment  No redness or rash.  -PC         Manual Lymphatic Drainage    Manual Lymphatic Drainage Comments  B inguinal, B LE's  -PC         Compression/Skin Care    Compression/Skin Care  remove bandages  -PC      Skin  Care  washed/dried;moisturizing lotion applied Eucerin  -PC      Wrapping Location  lower extremity  -PC      Wrapping Location LE  bilateral:;toes;foot to knee  -PC      Wrapping Comments  Tg9, Transleast on toes, Artiflex X 2, Short stretch bandages 1-8cm and 3-10cm.  -PC        User Key  (r) = Recorded By, (t) = Taken By, (c) = Cosigned By    Initials Name Provider Type    Gogo Carroll, PT Physical Therapist                        PT Assessment/Plan     Row Name 12/10/18 1522          PT Assessment    Assessment Comments  Bandages stayed on well, legs are responding to treatment.  -PC        PT Plan    PT Plan Comments  Cont.  -PC       User Key  (r) = Recorded By, (t) = Taken By, (c) = Cosigned By    Initials Name Provider Type    Gogo Carroll, PT Physical Therapist               Exercises     Row Name 12/10/18 1524 12/10/18 1500          Subjective Comments    Subjective Comments  --  States he was able to keep the bandages on over the weekend. States his right leg has gone down, but the left leg has been the worst.  -PC        Subjective Pain    Able to rate subjective pain?  --  yes  -PC     Pre-Treatment Pain Level  --  0  -PC     Post-Treatment Pain Level  --  0  -PC        Total Minutes    87036 - PT Manual Therapy Minutes  80  -PC  --       User Key  (r) = Recorded By, (t) = Taken By, (c) = Cosigned By    Initials Name Provider Type    Gogo Carroll, PT Physical Therapist                            Therapy Education  Given: Bandaging/dressing change  Program: Reinforced  How Provided: Verbal, Demonstration  Provided to: Patient  Level of Understanding: Verbalized              Time Calculation:   Start Time: 1055  Stop Time: 1215  Time Calculation (min): 80 min  Total Timed Code Minutes- PT: 80 minute(s)   Therapy Suggested Charges     Code   Minutes Charges    90367 (CPT®) Hc Pt Neuromusc Re Education Ea 15 Min      47416 (CPT®) Hc Pt Ther Proc Ea 15 Min      62877 (CPT®) Hc Gait Training  Ea 15 Min      40705 (CPT®) Hc Pt Therapeutic Act Ea 15 Min      03433 (CPT®) Hc Pt Manual Therapy Ea 15 Min 80 5    39084 (CPT®) Hc Pt Ther Massage- Per 15 Min      16641 (CPT®) Hc Pt Iontophoresis Ea 15 Min      78875 (CPT®) Hc Pt Elec Stim Ea-Per 15 Min      68120 (CPT®) Hc Pt Ultrasound Ea 15 Min      96250 (CPT®) Hc Pt Self Care/Mgmt/Train Ea 15 Min      03819 (CPT®) Hc Pt Prosthetic (S) Train Initial Encounter, Each 15 Min      56511 (CPT®) Hc Orthotic(S) Mgmt/Train Initial Encounter, Each 15min      48582 (CPT®) Hc Pt Aquatic Therapy Ea 15 Min      66664 (CPT®) Hc Pt Orthotic(S)/Prosthetic(S) Encounter, Each 15 Min       (CPT®) Hc Pt Electrical Stim Unattended      Total  80 5        Therapy Charges for Today     Code Description Service Date Service Provider Modifiers Qty    92309105516 HC PT MANUAL THERAPY EA 15 MIN 12/10/2018 Gogo Mei, PT GP 5                    Gogo Mei, PT  12/10/2018

## 2018-12-11 ENCOUNTER — HOSPITAL ENCOUNTER (OUTPATIENT)
Dept: PHYSICAL THERAPY | Facility: HOSPITAL | Age: 68
Setting detail: THERAPIES SERIES
Discharge: HOME | End: 2018-12-11

## 2018-12-11 DIAGNOSIS — I89.0 LYMPHEDEMA: Primary | ICD-10-CM

## 2018-12-11 PROCEDURE — 97140 MANUAL THERAPY 1/> REGIONS: CPT

## 2018-12-11 NOTE — THERAPY TREATMENT NOTE
Outpatient Physical Therapy Lymphedema Treatment Note  [facility]     Patient Name: Jose Hurtado  : 1950  MRN: [id number]  Today's Date: 2018        Visit Date: 2018    Visit Dx:    ICD-10-CM ICD-9-CM   1. Lymphedema I89.0 457.1       Patient Active Problem List   Diagnosis   • Adenocarcinoma of esophagus (CMS/HCC)   • Adenomatous polyp of colon   • Anemia   • Duodenal ulcer   • Insomnia   • Malignant neoplasm of prostate (CMS/HCC)   • RLS (restless legs syndrome)   • Thrombophlebitis of deep veins of lower extremity (CMS/HCC)   • Ventral hernia without obstruction or gangrene   • Incisional hernia, without obstruction or gangrene   • Hernia, incisional   • Osteoarthrosis, localized, primary, knee   • BPH (benign prostatic hyperplasia)   • Stasis dermatitis   • Medicare annual wellness visit, subsequent   • Reactive depression   • DVT (deep venous thrombosis) (CMS/HCC)   • Hammer toes of both feet   • Other hammer toe(s) (acquired), left foot   • Status post left foot surgery   • Surgery follow-up-Left second third and fourth distal interphalangeal joint resection with flexor tenotomy - Left   • Other hyperlipidemia   • Primary osteoarthritis of right knee   • OA (osteoarthritis) of knee   • Hypertension   • Anti-phospholipid syndrome (CMS/HCC)        Lymphedema     Row Name 18 1200 12/10/18 1500          Subjective Pain    Able to rate subjective pain?  yes  -KD  yes  -PC     Pre-Treatment Pain Level  0  -KD  0  -PC     Post-Treatment Pain Level  0  -KD  0  -PC        Subjective Comments    Subjective Comments  No new complaints/comments today.  -KD  States he was able to keep the bandages on over the weekend. States his right leg has gone down, but the left leg has been the worst.  -PC        Skin Changes/Observations    Skin Observations Comment  --  No redness or rash.  -PC        Lymphedema Measurements    Measurement Type(s)  Circumferential  -KD  --     Circumferential Areas   Lower extremities  -KD  --        BLE Circumferential (cm)    Measurement Location 1  Knee  -KD  --     Left 1  44 cm  -KD  --     Right 1  45 cm  -KD  --     Measurement Location 2  10cm below knee  -KD  --     Left 2  41.5 cm  -KD  --     Right 2  39.7 cm  -KD  --     Measurement Location 3  20cm below knee  -KD  --     Left 3  44.5 cm  -KD  --     Right 3  40.5 cm  -KD  --     Measurement Location 4  30cm below knee  -KD  --     Left 4  39 cm  -KD  --     Right 4  32.5 cm  -KD  --     Measurement Location 5  Ankle  -KD  --     Left 5  31 cm  -KD  --     Right 5  29 cm  -KD  --     Measurement Location 6  Midfoot  -KD  --     Left 6  26.5 cm  -KD  --     Right 6  25 cm  -KD  --     LLE Circumferential Total  226.5 cm  -KD  --     RLE Circumferential Total  211.7 cm  -KD  --        Manual Lymphatic Drainage    Manual Lymphatic Drainage Comments  B inguinal, B LE's  -KD  B inguinal, B LE's  -PC        Compression/Skin Care    Compression/Skin Care  remove bandages  -KD  remove bandages  -PC     Skin Care  washed/dried;moisturizing lotion applied Eucerin  -KD  washed/dried;moisturizing lotion applied Eucerin  -PC     Wrapping Location  lower extremity  -KD  lower extremity  -PC     Wrapping Location LE  bilateral:;toes;foot to knee  -KD  bilateral:;toes;foot to knee  -PC     Wrapping Comments  Tg9, Transleast on toes, Artiflex X 2 on right/piece of Rosidal Soft on top of foot with Artiflex X 1 left, also Rosidal Soft ankle to knee on left, Short stretch bandages 1-8cm and 3-10cm.  -KD  Tg9, Transleast on toes, Artiflex X 2, Short stretch bandages 1-8cm and 3-10cm.  -PC       User Key  (r) = Recorded By, (t) = Taken By, (c) = Cosigned By    Initials Name Provider Type    PC Gogo Mei, PT Physical Therapist    Ceci Esposito, MANDA Physical Therapist                        PT Assessment/Plan     Row Name 12/11/18 5400 12/10/18 1910       PT Assessment    Assessment Comments  Measurements have decreased minimally  thus far. Right leg shows improved contours. Added Rosidal Soft foam padding today to left leg to encourage further softening and reduction of edema.  -KD  Bandages stayed on well, legs are responding to treatment.  -PC       PT Plan    PT Plan Comments  Cont. Monitor tolerance to foam padding.  -KD  Cont.  -PC      User Key  (r) = Recorded By, (t) = Taken By, (c) = Cosigned By    Initials Name Provider Type    Gogo Carroll, PT Physical Therapist    Ceci Esposito, PT Physical Therapist               Exercises     Row Name 12/11/18 1232 12/11/18 1200 12/10/18 1524       Subjective Comments    Subjective Comments  --  No new complaints/comments today.  -KD  --       Subjective Pain    Able to rate subjective pain?  --  yes  -KD  --    Pre-Treatment Pain Level  --  0  -KD  --    Post-Treatment Pain Level  --  0  -KD  --       Total Minutes    06579 - PT Manual Therapy Minutes  79  -KD  --  80  -PC    Row Name 12/10/18 1500             Subjective Comments    Subjective Comments  States he was able to keep the bandages on over the weekend. States his right leg has gone down, but the left leg has been the worst.  -PC         Subjective Pain    Able to rate subjective pain?  yes  -PC      Pre-Treatment Pain Level  0  -PC      Post-Treatment Pain Level  0  -PC        User Key  (r) = Recorded By, (t) = Taken By, (c) = Cosigned By    Initials Name Provider Type    PC Gogo Mei, PT Physical Therapist    Ceci Esposito, PT Physical Therapist                        PT OP Goals     Row Name 12/11/18 1200          PT Short Term Goals    STG Date to Achieve  12/17/18  -KD     STG 1  Pt demo awareness of condition and precautions for improved prevention, management, care of symptoms, and ease of transition to self-care of condition.  -KD     STG 1 Progress  Ongoing  -KD     STG 2  Pt/family independent with self-wrapping techniques of compression bandages as indicated for improved self-management of condition.   -KD     STG 2 Progress  New  -KD     STG 3  Pt demo decreased net edema of >/=5-10cm for decreased edema symptoms, decreased risk of infection, and improved skin care.  -KD     STG 3 Progress  Partially Met  -KD     STG 3 Progress Comments  Right leg has gone down by 5.8cm total thus far.  -KD        Long Term Goals    LTG Date to Achieve  01/02/19  -KD     LTG 1  Pt/family independent with self care techniques for self-management of condition.  -KD     LTG 1 Progress  New  -KD     LTG 2  Pt demo decreased net edema of >/=10-20cm for decreased edema symptoms, decreased risk of infection, and improved skin care.  -KD     LTG 2 Progress  New  -KD     LTG 3  Pt/family independent with compression garments as indicated for self-management of condition.  -KD     LTG 3 Progress  New  -KD        Time Calculation    PT Goal Re-Cert Due Date  12/14/18  -KD       User Key  (r) = Recorded By, (t) = Taken By, (c) = Cosigned By    Initials Name Provider Type    Ceci Esposito, PT Physical Therapist          Therapy Education  Education Details: Reviewed use of foam bandaging instead of as much cotton today.  Given: Bandaging/dressing change  Program: New  How Provided: Verbal, Demonstration  Provided to: Patient  Level of Understanding: Verbalized              Time Calculation:   Start Time: 1057  Stop Time: 1216  Time Calculation (min): 79 min  Total Timed Code Minutes- PT: 79 minute(s)   Therapy Suggested Charges     Code   Minutes Charges    02013 (CPT®) Hc Pt Neuromusc Re Education Ea 15 Min      22527 (CPT®) Hc Pt Ther Proc Ea 15 Min      48079 (CPT®) Hc Gait Training Ea 15 Min      16581 (CPT®) Hc Pt Therapeutic Act Ea 15 Min      54209 (CPT®) Hc Pt Manual Therapy Ea 15 Min 79 5    68826 (CPT®) Hc Pt Ther Massage- Per 15 Min      81049 (CPT®) Hc Pt Iontophoresis Ea 15 Min      94573 (CPT®) Hc Pt Elec Stim Ea-Per 15 Min      21106 (CPT®) Hc Pt Ultrasound Ea 15 Min      60604 (CPT®) Hc Pt Self Care/Mgmt/Train Ea 15 Min       33472 (CPT®) Hc Pt Prosthetic (S) Train Initial Encounter, Each 15 Min      06685 (CPT®) Hc Orthotic(S) Mgmt/Train Initial Encounter, Each 15min      37024 (CPT®) Hc Pt Aquatic Therapy Ea 15 Min      85287 (CPT®) Hc Pt Orthotic(S)/Prosthetic(S) Encounter, Each 15 Min       (CPT®) Hc Pt Electrical Stim Unattended      Total  79 5        Therapy Charges for Today     Code Description Service Date Service Provider Modifiers Qty    96465585853 HC PT MANUAL THERAPY EA 15 MIN 12/11/2018 Ceci Ruby, PT GP 5                    Ceci Ruby, PT  12/11/2018

## 2018-12-12 ENCOUNTER — HOSPITAL ENCOUNTER (OUTPATIENT)
Dept: PHYSICAL THERAPY | Facility: HOSPITAL | Age: 68
Setting detail: THERAPIES SERIES
Discharge: HOME | End: 2018-12-12

## 2018-12-12 DIAGNOSIS — I89.0 LYMPHEDEMA: Primary | ICD-10-CM

## 2018-12-12 PROCEDURE — 97140 MANUAL THERAPY 1/> REGIONS: CPT

## 2018-12-12 NOTE — THERAPY TREATMENT NOTE
Outpatient Physical Therapy Lymphedema Treatment Note  [facility]     Patient Name: Jose Hurtado  : 1950  MRN: [id number]  Today's Date: 2018        Visit Date: 2018    Visit Dx:    ICD-10-CM ICD-9-CM   1. Lymphedema I89.0 457.1       Patient Active Problem List   Diagnosis   • Adenocarcinoma of esophagus (CMS/HCC)   • Adenomatous polyp of colon   • Anemia   • Duodenal ulcer   • Insomnia   • Malignant neoplasm of prostate (CMS/HCC)   • RLS (restless legs syndrome)   • Thrombophlebitis of deep veins of lower extremity (CMS/HCC)   • Ventral hernia without obstruction or gangrene   • Incisional hernia, without obstruction or gangrene   • Hernia, incisional   • Osteoarthrosis, localized, primary, knee   • BPH (benign prostatic hyperplasia)   • Stasis dermatitis   • Medicare annual wellness visit, subsequent   • Reactive depression   • DVT (deep venous thrombosis) (CMS/HCC)   • Hammer toes of both feet   • Other hammer toe(s) (acquired), left foot   • Status post left foot surgery   • Surgery follow-up-Left second third and fourth distal interphalangeal joint resection with flexor tenotomy - Left   • Other hyperlipidemia   • Primary osteoarthritis of right knee   • OA (osteoarthritis) of knee   • Hypertension   • Anti-phospholipid syndrome (CMS/HCC)        Lymphedema     Row Name 18 1500             Subjective Pain    Able to rate subjective pain?  yes  -PC      Pre-Treatment Pain Level  0  -PC      Post-Treatment Pain Level  0  -PC         Subjective Comments    Subjective Comments  Can tell a difference with the foam.  States he's going to a nice dinner tonight and wonders if we can wrap his legs lighter so he can get in his regular clothes and shoes.  -PC         Manual Lymphatic Drainage    Manual Lymphatic Drainage Comments  B inguinal, B LE's  -PC         Compression/Skin Care    Compression/Skin Care  remove bandages  -PC      Skin Care  washed/dried;moisturizing lotion applied  Eucerin  -PC      Wrapping Location  lower extremity  -PC      Wrapping Location LE  bilateral:;toes;foot to knee  -PC      Wrapping Comments  Tg9,  Artiflex X 2, Short stretch bandages 1-8cm and 3-10cm.  -PC      Compression/Skin Care Comments  Did not wrap toes or use Rosidal soft today per pt request.  -PC        User Key  (r) = Recorded By, (t) = Taken By, (c) = Cosigned By    Initials Name Provider Type    Gogo Carroll, PT Physical Therapist                        PT Assessment/Plan     Row Name 12/12/18 1531          PT Assessment    Assessment Comments  Foam seemed to help left leg, will resume it tmro.  -PC        PT Plan    PT Plan Comments  Cont.  -PC       User Key  (r) = Recorded By, (t) = Taken By, (c) = Cosigned By    Initials Name Provider Type    Gogo Carroll, PT Physical Therapist               Exercises     Row Name 12/12/18 1546 12/12/18 1500          Subjective Comments    Subjective Comments  --  Can tell a difference with the foam.  States he's going to a nice dinner tonight and wonders if we can wrap his legs lighter so he can get in his regular clothes and shoes.  -PC        Subjective Pain    Able to rate subjective pain?  --  yes  -PC     Pre-Treatment Pain Level  --  0  -PC     Post-Treatment Pain Level  --  0  -PC        Total Minutes    74064 - PT Manual Therapy Minutes  60  -PC  --       User Key  (r) = Recorded By, (t) = Taken By, (c) = Cosigned By    Initials Name Provider Type    Gogo Carroll, PT Physical Therapist                            Therapy Education  Given: Bandaging/dressing change  Program: Reinforced  How Provided: Verbal  Provided to: Patient  Level of Understanding: Verbalized              Time Calculation:   Start Time: 1100  Stop Time: 1200  Time Calculation (min): 60 min  Total Timed Code Minutes- PT: 60 minute(s)   Therapy Suggested Charges     Code   Minutes Charges    99360 (CPT®) Hc Pt Neuromusc Re Education Ea 15 Min      62378 (CPT®) Hc Pt  Ther Proc Ea 15 Min      42520 (CPT®) Hc Gait Training Ea 15 Min      78025 (CPT®) Hc Pt Therapeutic Act Ea 15 Min      30497 (CPT®) Hc Pt Manual Therapy Ea 15 Min 60 4    18727 (CPT®) Hc Pt Ther Massage- Per 15 Min      09290 (CPT®) Hc Pt Iontophoresis Ea 15 Min      30018 (CPT®) Hc Pt Elec Stim Ea-Per 15 Min      21841 (CPT®) Hc Pt Ultrasound Ea 15 Min      28444 (CPT®) Hc Pt Self Care/Mgmt/Train Ea 15 Min      38199 (CPT®) Hc Pt Prosthetic (S) Train Initial Encounter, Each 15 Min      98630 (CPT®) Hc Orthotic(S) Mgmt/Train Initial Encounter, Each 15min      28029 (CPT®) Hc Pt Aquatic Therapy Ea 15 Min      49442 (CPT®) Hc Pt Orthotic(S)/Prosthetic(S) Encounter, Each 15 Min       (CPT®) Hc Pt Electrical Stim Unattended      Total  60 4        Therapy Charges for Today     Code Description Service Date Service Provider Modifiers Qty    69175896429 HC PT MANUAL THERAPY EA 15 MIN 12/12/2018 Gogo Mei, PT GP 4                    Gogo Mei, PT  12/12/2018

## 2018-12-13 ENCOUNTER — HOSPITAL ENCOUNTER (OUTPATIENT)
Dept: PHYSICAL THERAPY | Facility: HOSPITAL | Age: 68
Setting detail: THERAPIES SERIES
Discharge: HOME | End: 2018-12-13

## 2018-12-13 DIAGNOSIS — I89.0 LYMPHEDEMA: Primary | ICD-10-CM

## 2018-12-13 PROCEDURE — 97140 MANUAL THERAPY 1/> REGIONS: CPT

## 2018-12-13 NOTE — THERAPY TREATMENT NOTE
Outpatient Physical Therapy Lymphedema Treatment Note  [facility]     Patient Name: Jose Hurtado  : 1950  MRN: [id number]  Today's Date: 2018        Visit Date: 2018    Visit Dx:    ICD-10-CM ICD-9-CM   1. Lymphedema I89.0 457.1       Patient Active Problem List   Diagnosis   • Adenocarcinoma of esophagus (CMS/HCC)   • Adenomatous polyp of colon   • Anemia   • Duodenal ulcer   • Insomnia   • Malignant neoplasm of prostate (CMS/HCC)   • RLS (restless legs syndrome)   • Thrombophlebitis of deep veins of lower extremity (CMS/HCC)   • Ventral hernia without obstruction or gangrene   • Incisional hernia, without obstruction or gangrene   • Hernia, incisional   • Osteoarthrosis, localized, primary, knee   • BPH (benign prostatic hyperplasia)   • Stasis dermatitis   • Medicare annual wellness visit, subsequent   • Reactive depression   • DVT (deep venous thrombosis) (CMS/HCC)   • Hammer toes of both feet   • Other hammer toe(s) (acquired), left foot   • Status post left foot surgery   • Surgery follow-up-Left second third and fourth distal interphalangeal joint resection with flexor tenotomy - Left   • Other hyperlipidemia   • Primary osteoarthritis of right knee   • OA (osteoarthritis) of knee   • Hypertension   • Anti-phospholipid syndrome (CMS/HCC)        Lymphedema     Row Name 18 1100 18 1500          Subjective Pain    Able to rate subjective pain?  yes  -KD  yes  -PC     Pre-Treatment Pain Level  0  -KD  0  -PC     Post-Treatment Pain Level  0  -KD  0  -PC        Subjective Comments    Subjective Comments  Statse he thinks his left leg is a little puffier today. Seeing the podiatrist today to have toenails trimmed.  -KD  Can tell a difference with the foam.  States he's going to a nice dinner tonight and wonders if we can wrap his legs lighter so he can get in his regular clothes and shoes.  -PC        Manual Lymphatic Drainage    Manual Lymphatic Drainage Comments  --  B  inguinal, B LE's  -PC        Compression/Skin Care    Compression/Skin Care  remove bandages  -KD  remove bandages  -PC     Skin Care  washed/dried;moisturizing lotion applied Eucerin  -KD  washed/dried;moisturizing lotion applied Eucerin  -PC     Wrapping Location  lower extremity  -KD  lower extremity  -PC     Wrapping Location LE  bilateral:;toes;foot to knee  -KD  bilateral:;toes;foot to knee  -PC     Wrapping Comments  Tg9, Transleast on toes, Artiflex X 2 on right/piece of Rosidal Soft on top of foot with Artiflex X 1 left, also Rosidal Soft ankle to knee on left, Short stretch bandages 1-8cm and 3-10cm.  -KD  Tg9,  Artiflex X 2, Short stretch bandages 1-8cm and 3-10cm.  -PC     Compression/Skin Care Comments  --  Did not wrap toes or use Rosidal soft today per pt request.  -PC       User Key  (r) = Recorded By, (t) = Taken By, (c) = Cosigned By    Initials Name Provider Type    PC Gogo Mei, PT Physical Therapist    Ceci Esposito, PT Physical Therapist                        PT Assessment/Plan     Row Name 12/13/18 1118 12/12/18 1531       PT Assessment    Assessment Comments  Back to using foam roll today on the left leg. Pt having toenails trimmed today.  -KD  Foam seemed to help left leg, will resume it tmro.  -PC       PT Plan    PT Plan Comments  Cont.  -KD  Cont.  -PC      User Key  (r) = Recorded By, (t) = Taken By, (c) = Cosigned By    Initials Name Provider Type    Gogo Carrlol, PT Physical Therapist    Ceci Esposito, PT Physical Therapist               Exercises     Row Name 12/13/18 1121 12/13/18 1100 12/12/18 1546       Subjective Comments    Subjective Comments  --  Statse he thinks his left leg is a little puffier today. Seeing the podiatrist today to have toenails trimmed.  -KD  --       Subjective Pain    Able to rate subjective pain?  --  yes  -KD  --    Pre-Treatment Pain Level  --  0  -KD  --    Post-Treatment Pain Level  --  0  -KD  --       Total Minutes    94332 -  PT Manual Therapy Minutes  65  -KD  --  60  -PC    Row Name 12/12/18 1500             Subjective Comments    Subjective Comments  Can tell a difference with the foam.  States he's going to a nice dinner tonight and wonders if we can wrap his legs lighter so he can get in his regular clothes and shoes.  -PC         Subjective Pain    Able to rate subjective pain?  yes  -PC      Pre-Treatment Pain Level  0  -PC      Post-Treatment Pain Level  0  -PC        User Key  (r) = Recorded By, (t) = Taken By, (c) = Cosigned By    Initials Name Provider Type    PC Gogo Mei, PT Physical Therapist    Ceci Esposito, PT Physical Therapist                            Therapy Education  Education Details: Importance of skin/foot care.  Given: Symptoms/condition management  Program: Reinforced  How Provided: Verbal  Provided to: Patient  Level of Understanding: Verbalized              Time Calculation:   Start Time: 0835  Stop Time: 0940  Time Calculation (min): 65 min  Total Timed Code Minutes- PT: 65 minute(s)   Therapy Suggested Charges     Code   Minutes Charges    22972 (CPT®) Hc Pt Neuromusc Re Education Ea 15 Min      17281 (CPT®) Hc Pt Ther Proc Ea 15 Min      57180 (CPT®) Hc Gait Training Ea 15 Min      73668 (CPT®) Hc Pt Therapeutic Act Ea 15 Min      37736 (CPT®) Hc Pt Manual Therapy Ea 15 Min 65 4    09465 (CPT®) Hc Pt Ther Massage- Per 15 Min      62455 (CPT®) Hc Pt Iontophoresis Ea 15 Min      06171 (CPT®) Hc Pt Elec Stim Ea-Per 15 Min      28073 (CPT®) Hc Pt Ultrasound Ea 15 Min      57596 (CPT®) Hc Pt Self Care/Mgmt/Train Ea 15 Min      76000 (CPT®) Hc Pt Prosthetic (S) Train Initial Encounter, Each 15 Min      56679 (CPT®) Hc Orthotic(S) Mgmt/Train Initial Encounter, Each 15min      31806 (CPT®) Hc Pt Aquatic Therapy Ea 15 Min      60014 (CPT®) Hc Pt Orthotic(S)/Prosthetic(S) Encounter, Each 15 Min       (CPT®) Hc Pt Electrical Stim Unattended      Total  65 4        Therapy Charges for Today     Code  Description Service Date Service Provider Modifiers Qty    32903210990 HC PT MANUAL THERAPY EA 15 MIN 12/13/2018 Ceci Ruby, PT GP 4                    Ceci Ruby, PT  12/13/2018

## 2018-12-14 ENCOUNTER — HOSPITAL ENCOUNTER (OUTPATIENT)
Dept: PHYSICAL THERAPY | Facility: HOSPITAL | Age: 68
Setting detail: THERAPIES SERIES
Discharge: HOME | End: 2018-12-14

## 2018-12-14 DIAGNOSIS — I89.0 LYMPHEDEMA: Primary | ICD-10-CM

## 2018-12-14 PROCEDURE — 97140 MANUAL THERAPY 1/> REGIONS: CPT

## 2018-12-14 NOTE — THERAPY PROGRESS REPORT/RE-CERT
Physical Therapy Lymphedema Re-Assessment  [facility]     Patient Name: Jose Hurtado  : 1950  MRN: [id number]  Today's Date: 2018      Visit Date: 2018    Visit Dx:    ICD-10-CM ICD-9-CM   1. Lymphedema I89.0 457.1       Patient Active Problem List   Diagnosis   • Adenocarcinoma of esophagus (CMS/HCC)   • Adenomatous polyp of colon   • Anemia   • Duodenal ulcer   • Insomnia   • Malignant neoplasm of prostate (CMS/HCC)   • RLS (restless legs syndrome)   • Thrombophlebitis of deep veins of lower extremity (CMS/HCC)   • Ventral hernia without obstruction or gangrene   • Incisional hernia, without obstruction or gangrene   • Hernia, incisional   • Osteoarthrosis, localized, primary, knee   • BPH (benign prostatic hyperplasia)   • Stasis dermatitis   • Medicare annual wellness visit, subsequent   • Reactive depression   • DVT (deep venous thrombosis) (CMS/HCC)   • Hammer toes of both feet   • Other hammer toe(s) (acquired), left foot   • Status post left foot surgery   • Surgery follow-up-Left second third and fourth distal interphalangeal joint resection with flexor tenotomy - Left   • Other hyperlipidemia   • Primary osteoarthritis of right knee   • OA (osteoarthritis) of knee   • Hypertension   • Anti-phospholipid syndrome (CMS/HCC)        Past Medical History:   Diagnosis Date   • Anemia    • Anti-phospholipid syndrome (CMS/HCC)     On lifelong anticoagulation therapy   • Arthritis     RIGHT KNEE   • Bladder disorder     LEAKAGE   ON MED  wers pads   • Community acquired pneumonia     HISTORY OF IN    • Deep venous thrombosis (CMS/HCC) ,     Left lower extremity multiple   • Depression    • Esophageal carcinoma (CMS/HCC) 2014    had chemo and radiation prior surgery   • Fatigue    • Hammer toe of left foot     had surgery   • Hemorrhoids    • HH (hiatus hernia)    • History of atrial fibrillation 2015    ONE EPISODE WHILE HOSPITALIZED   • History of kidney stones    •  History of nephrolithiasis    • History of pancreatitis     PT STATES MANY YEARS AGO   • History of radiation therapy    • Hypertension    • Long-term (current) use of anticoagulants, INR goal 2.0-3.0    • Malignant neoplasm of prostate (CMS/HCC)    • Restless legs syndrome    • Squamous 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   • CYSTOSCOPY W/ LASER LITHOTRIPSY     • ESOPHAGECTOMY      April 2015, stage IIB esophageal carcinoma, sub-total resection.   • ESOPHAGECTOMY      Esophagectomy Subtotal Revillo Joe Procedure   • EXCISION LESION  08/2012    Removal of Squamous Cell CA on Head   • HAMMER TOE REPAIR  09/2014    Hammertoe Operation (Each Toe), 10/2014   • HERNIA REPAIR      incisional   • JEJUNOSTOMY      Laparoscopic   • JEJUNOSTOMY      tube removal    • KNEE SURGERY Bilateral 1967, 1973, 1981   • KNEE SURGERY Right 03/26/2018    TKR   • PATELLA SURGERY Left     removed   • PILONIDAL CYST / SINUS EXCISION     • PROSTATECTOMY  2010   • PYLOROPLASTY     • SPINAL FUSION  02/1998    C 5,6   • TONSILLECTOMY  1955   • TONSILLECTOMY     • UPPER GASTROINTESTINAL ENDOSCOPY  12/15/2014    LA Grade D esophagitis, Pardo's, HH, multiple duodenal ulcers   • UPPER GASTROINTESTINAL ENDOSCOPY      (Therapeutic)       Visit Dx:    ICD-10-CM ICD-9-CM   1. Lymphedema I89.0 457.1           Lymphedema     Row Name 12/14/18 1000 12/13/18 1100          Subjective Pain    Able to rate subjective pain?  yes  -PC  yes  -KD     Pre-Treatment Pain Level  0  -PC  0  -KD     Post-Treatment Pain Level  0  -PC  0  -KD        Subjective Comments    Subjective Comments  States he would like to hold on the foam wrap until Monday, use cotton  for the weekend.  -PC  Statse he thinks his left leg is a little puffier today. Seeing the podiatrist today to have toenails trimmed.  -KD        Lymphedema Edema Assessment    Edema Assessment Comment  Both legs appear to be decreasing in size.  -PC  --        Skin Changes/Observations    Skin Observations Comment  No redness or rash.  -PC  --        Manual Lymphatic Drainage    Manual Lymphatic Drainage Comments  B inguinal, B LE's  -PC  --        Compression/Skin Care    Compression/Skin Care  remove bandages  -PC  remove bandages  -KD     Skin Care  washed/dried;moisturizing lotion applied Eucerin  -PC  washed/dried;moisturizing lotion applied Eucerin  -KD     Wrapping Location  lower extremity  -PC  lower extremity  -KD     Wrapping Location LE  bilateral:;toes;foot to knee  -PC  bilateral:;toes;foot to knee  -KD     Wrapping Comments  Tg9, transelast to toes,  artiflex x2, short stretch bandages 1-8cm, 2-10cm, 1-12cm.  -PC  Tg9, Transleast on toes, Artiflex X 2 on right/piece of Rosidal Soft on top of foot with Artiflex X 1 left, also Rosidal Soft ankle to knee on left, Short stretch bandages 1-8cm and 3-10cm.  -KD     Compression/Skin Care Comments  Used cotton instead of foam per pt request.  -PC  --       User Key  (r) = Recorded By, (t) = Taken By, (c) = Cosigned By    Initials Name Provider Type    PC Gogo Mei, PT Physical Therapist    Ceci Esposito, PT Physical Therapist                          Therapy Education  Given: Edema management  Program: Reinforced  How Provided: Verbal  Provided to: Patient  Level of Understanding: Verbalized      Exercises     Row Name 12/14/18 1036 12/14/18 1000 12/13/18 1121       Subjective Comments    Subjective Comments  --  States he would like to hold on the foam wrap until Monday, use cotton for the weekend.  -PC  --       Subjective Pain    Able to rate subjective pain?  --  yes  -PC  --    Pre-Treatment Pain Level  --  0  -PC  --    Post-Treatment Pain  Level  --  0  -PC  --       Total Minutes    51003 - PT Manual Therapy Minutes  60  -PC  --  65  -KD    Row Name 12/13/18 1100             Subjective Comments    Subjective Comments  Statse he thinks his left leg is a little puffier today. Seeing the podiatrist today to have toenails trimmed.  -KD         Subjective Pain    Able to rate subjective pain?  yes  -KD      Pre-Treatment Pain Level  0  -KD      Post-Treatment Pain Level  0  -KD        User Key  (r) = Recorded By, (t) = Taken By, (c) = Cosigned By    Initials Name Provider Type    PC Gogo Mei, PT Physical Therapist    Ceci Esposito, PT Physical Therapist                        PT OP Goals     Row Name 12/14/18 1000          PT Short Term Goals    STG Date to Achieve  12/17/18  -PC     STG 1  Pt demo awareness of condition and precautions for improved prevention, management, care of symptoms, and ease of transition to self-care of condition.  -PC     STG 1 Progress  Ongoing  -PC     STG 2  Pt/family independent with self-wrapping techniques of compression bandages as indicated for improved self-management of condition.  -PC     STG 2 Progress  Ongoing  -PC     STG 3  Pt demo decreased net edema of >/=5-10cm for decreased edema symptoms, decreased risk of infection, and improved skin care.  -PC     STG 3 Progress  Progressing  -PC        Long Term Goals    LTG Date to Achieve  01/02/19  -PC     LTG 1  Pt/family independent with self care techniques for self-management of condition.  -PC     LTG 1 Progress  Ongoing  -PC     LTG 2  Pt demo decreased net edema of >/=10-20cm for decreased edema symptoms, decreased risk of infection, and improved skin care.  -PC     LTG 2 Progress  Ongoing  -PC     LTG 3  Pt/family independent with compression garments as indicated for self-management of condition.  -PC     LTG 3 Progress  Ongoing  -PC       User Key  (r) = Recorded By, (t) = Taken By, (c) = Cosigned By    Initials Name Provider Type    FRANCISCO Mei  Gogo DIAZ, PT Physical Therapist          PT Assessment/Plan     Row Name 12/14/18 1033 12/13/18 1118       PT Assessment    Assessment Comments  Pt is showing improvement with decreased measurements, improved leg contours, and improved skin condition.  -PC  Back to using foam roll today on the left leg. Pt having toenails trimmed today.  -KD       PT Plan    PT Plan Comments  Cont with POC.  -PC  Cont.  -KD      User Key  (r) = Recorded By, (t) = Taken By, (c) = Cosigned By    Initials Name Provider Type    PC Gogo Mei, PT Physical Therapist    KD Ceci Ruby, MANDA Physical Therapist                       Time Calculation:   Start Time: 0830  Stop Time: 0930  Time Calculation (min): 60 min  Total Timed Code Minutes- PT: 60 minute(s)   Therapy Suggested Charges     Code   Minutes Charges    92971 (CPT®) Hc Pt Neuromusc Re Education Ea 15 Min      98853 (CPT®) Hc Pt Ther Proc Ea 15 Min      02078 (CPT®) Hc Gait Training Ea 15 Min      98673 (CPT®) Hc Pt Therapeutic Act Ea 15 Min      06961 (CPT®) Hc Pt Manual Therapy Ea 15 Min 60 4    66258 (CPT®) Hc Pt Ther Massage- Per 15 Min      97484 (CPT®) Hc Pt Iontophoresis Ea 15 Min      28544 (CPT®) Hc Pt Elec Stim Ea-Per 15 Min      76181 (CPT®) Hc Pt Ultrasound Ea 15 Min      57620 (CPT®) Hc Pt Self Care/Mgmt/Train Ea 15 Min      66985 (CPT®) Hc Pt Prosthetic (S) Train Initial Encounter, Each 15 Min      09516 (CPT®) Hc Orthotic(S) Mgmt/Train Initial Encounter, Each 15min      79376 (CPT®) Hc Pt Aquatic Therapy Ea 15 Min      86852 (CPT®) Hc Pt Orthotic(S)/Prosthetic(S) Encounter, Each 15 Min       (CPT®) Hc Pt Electrical Stim Unattended      Total  60 4        Therapy Charges for Today     Code Description Service Date Service Provider Modifiers Qty    15640740356 HC PT MANUAL THERAPY EA 15 MIN 12/14/2018 Gogo Mei, PT GP 4                    Gogo Mei, PT  12/14/2018

## 2018-12-15 LAB — INR PPP: 1.9 (ref 2–3)

## 2018-12-17 ENCOUNTER — ANTICOAGULATION VISIT (OUTPATIENT)
Dept: INTERNAL MEDICINE | Age: 68
End: 2018-12-17

## 2018-12-17 ENCOUNTER — HOSPITAL ENCOUNTER (OUTPATIENT)
Dept: PHYSICAL THERAPY | Facility: HOSPITAL | Age: 68
Setting detail: THERAPIES SERIES
Discharge: HOME | End: 2018-12-17

## 2018-12-17 DIAGNOSIS — I89.0 LYMPHEDEMA: Primary | ICD-10-CM

## 2018-12-17 PROCEDURE — G8979 MOBILITY GOAL STATUS: HCPCS

## 2018-12-17 PROCEDURE — G8978 MOBILITY CURRENT STATUS: HCPCS

## 2018-12-17 PROCEDURE — 97140 MANUAL THERAPY 1/> REGIONS: CPT

## 2018-12-17 NOTE — THERAPY TREATMENT NOTE
Outpatient Physical Therapy Lymphedema Treatment Note  [facility]     Patient Name: Jose Hurtado  : 1950  MRN: [id number]  Today's Date: 2018        Visit Date: 2018    Visit Dx:    ICD-10-CM ICD-9-CM   1. Lymphedema I89.0 457.1       Patient Active Problem List   Diagnosis   • Adenocarcinoma of esophagus (CMS/HCC)   • Adenomatous polyp of colon   • Anemia   • Duodenal ulcer   • Insomnia   • Malignant neoplasm of prostate (CMS/HCC)   • RLS (restless legs syndrome)   • Thrombophlebitis of deep veins of lower extremity (CMS/HCC)   • Ventral hernia without obstruction or gangrene   • Incisional hernia, without obstruction or gangrene   • Hernia, incisional   • Osteoarthrosis, localized, primary, knee   • BPH (benign prostatic hyperplasia)   • Stasis dermatitis   • Medicare annual wellness visit, subsequent   • Reactive depression   • DVT (deep venous thrombosis) (CMS/HCC)   • Hammer toes of both feet   • Other hammer toe(s) (acquired), left foot   • Status post left foot surgery   • Surgery follow-up-Left second third and fourth distal interphalangeal joint resection with flexor tenotomy - Left   • Other hyperlipidemia   • Primary osteoarthritis of right knee   • OA (osteoarthritis) of knee   • Hypertension   • Anti-phospholipid syndrome (CMS/HCC)        Lymphedema     Row Name 18 0900             Subjective Pain    Able to rate subjective pain?  yes  -PC      Pre-Treatment Pain Level  0  -PC      Post-Treatment Pain Level  0  -PC         Subjective Comments    Subjective Comments  States he was able to leave bandages on all weekend with no problems.  -PC         Skin Changes/Observations    Skin Observations Comment  Few small red spots left knee just above top of bandages.   -PC         Manual Lymphatic Drainage    Manual Lymphatic Drainage Comments  B inguinal, B LE's  -PC         Compression/Skin Care    Compression/Skin Care  remove bandages  -PC      Skin Care   washed/dried;moisturizing lotion applied Eucerin, zinc oxide on red spots  -PC      Wrapping Location  lower extremity  -PC      Wrapping Location LE  bilateral:;toes;foot to knee  -PC      Wrapping Comments  Tg9, Transleast on toes, Artiflex X 2 on right/piece of Rosidal Soft on top of foot with Artiflex X 1 left, also Rosidal Soft ankle to knee on left, Short stretch bandages 1-8cm and 3-10cm.  -PC      Compression/Skin Care Comments  Resumed using foam on left leg  -PC        User Key  (r) = Recorded By, (t) = Taken By, (c) = Cosigned By    Initials Name Provider Type    Gogo Carroll, PT Physical Therapist                        PT Assessment/Plan     Row Name 12/17/18 0958          PT Assessment    Assessment Comments  Pt tolerates bandages well and is very compliant.  -PC        PT Plan    PT Plan Comments  Cont.  -PC       User Key  (r) = Recorded By, (t) = Taken By, (c) = Cosigned By    Initials Name Provider Type    Gogo Carroll, PT Physical Therapist               Exercises     Row Name 12/17/18 0958 12/17/18 0900          Subjective Comments    Subjective Comments  --  States he was able to leave bandages on all weekend with no problems.  -PC        Subjective Pain    Able to rate subjective pain?  --  yes  -PC     Pre-Treatment Pain Level  --  0  -PC     Post-Treatment Pain Level  --  0  -PC        Total Minutes    84430 - PT Manual Therapy Minutes  74  -PC  --       User Key  (r) = Recorded By, (t) = Taken By, (c) = Cosigned By    Initials Name Provider Type    Gogo Carroll, PT Physical Therapist                            Therapy Education  Education Details: Discussed compression over holiday weekend coming up.  Pt will leave bandages on as long as possible and then wear compression stockings with 1-2 bandages over them.  Given: Edema management  Program: Reinforced  How Provided: Verbal  Provided to: Patient  Level of Understanding: Verbalized              Time Calculation:   Start  Time: 0829  Stop Time: 0943  Time Calculation (min): 74 min  Total Timed Code Minutes- PT: 74 minute(s)   Therapy Suggested Charges     Code   Minutes Charges    56256 (CPT®) Hc Pt Neuromusc Re Education Ea 15 Min      42756 (CPT®) Hc Pt Ther Proc Ea 15 Min      09047 (CPT®) Hc Gait Training Ea 15 Min      38490 (CPT®) Hc Pt Therapeutic Act Ea 15 Min      22861 (CPT®) Hc Pt Manual Therapy Ea 15 Min 74 5    84405 (CPT®) Hc Pt Ther Massage- Per 15 Min      65936 (CPT®) Hc Pt Iontophoresis Ea 15 Min      73865 (CPT®) Hc Pt Elec Stim Ea-Per 15 Min      52529 (CPT®) Hc Pt Ultrasound Ea 15 Min      18833 (CPT®) Hc Pt Self Care/Mgmt/Train Ea 15 Min      59483 (CPT®) Hc Pt Prosthetic (S) Train Initial Encounter, Each 15 Min      49316 (CPT®) Hc Orthotic(S) Mgmt/Train Initial Encounter, Each 15min      13230 (CPT®) Hc Pt Aquatic Therapy Ea 15 Min      90937 (CPT®) Hc Pt Orthotic(S)/Prosthetic(S) Encounter, Each 15 Min       (CPT®) Hc Pt Electrical Stim Unattended      Total  74 5        Therapy Charges for Today     Code Description Service Date Service Provider Modifiers Qty    97818604176 HC PT MOBILITY CURRENT 12/17/2018 Gogo Mei, PT GP, CJ 1    56408936106 HC PT MOBILITY PROJECTED 12/17/2018 Gogo Mei, PT GP, CI 1    69501466733 HC PT MANUAL THERAPY EA 15 MIN 12/17/2018 Gogo Mei, PT GP 5          PT G-Codes  PT Professional Judgement Used?: Yes  Functional Limitation: Mobility: Walking and moving around  Mobility: Walking and Moving Around Current Status (): At least 20 percent but less than 40 percent impaired, limited or restricted  Mobility: Walking and Moving Around Goal Status (): At least 1 percent but less than 20 percent impaired, limited or restricted         Gogo Mei, PT  12/17/2018

## 2018-12-18 ENCOUNTER — HOSPITAL ENCOUNTER (OUTPATIENT)
Dept: PHYSICAL THERAPY | Facility: HOSPITAL | Age: 68
Setting detail: THERAPIES SERIES
Discharge: HOME | End: 2018-12-18

## 2018-12-18 DIAGNOSIS — I89.0 LYMPHEDEMA: Primary | ICD-10-CM

## 2018-12-18 PROCEDURE — G8978 MOBILITY CURRENT STATUS: HCPCS

## 2018-12-18 PROCEDURE — G8979 MOBILITY GOAL STATUS: HCPCS

## 2018-12-18 PROCEDURE — 97140 MANUAL THERAPY 1/> REGIONS: CPT

## 2018-12-18 NOTE — THERAPY TREATMENT NOTE
Outpatient Physical Therapy Lymphedema 10th Visit Report/ Progress Note  [facility]     Patient Name: Jose Hurtado  : 1950  MRN: [id number]  Today's Date: 2018        Visit Date: 2018    Visit Dx:    ICD-10-CM ICD-9-CM   1. Lymphedema I89.0 457.1       Patient Active Problem List   Diagnosis   • Adenocarcinoma of esophagus (CMS/HCC)   • Adenomatous polyp of colon   • Anemia   • Duodenal ulcer   • Insomnia   • Malignant neoplasm of prostate (CMS/HCC)   • RLS (restless legs syndrome)   • Thrombophlebitis of deep veins of lower extremity (CMS/HCC)   • Ventral hernia without obstruction or gangrene   • Incisional hernia, without obstruction or gangrene   • Hernia, incisional   • Osteoarthrosis, localized, primary, knee   • BPH (benign prostatic hyperplasia)   • Stasis dermatitis   • Medicare annual wellness visit, subsequent   • Reactive depression   • DVT (deep venous thrombosis) (CMS/HCC)   • Hammer toes of both feet   • Other hammer toe(s) (acquired), left foot   • Status post left foot surgery   • Surgery follow-up-Left second third and fourth distal interphalangeal joint resection with flexor tenotomy - Left   • Other hyperlipidemia   • Primary osteoarthritis of right knee   • OA (osteoarthritis) of knee   • Hypertension   • Anti-phospholipid syndrome (CMS/HCC)        Lymphedema     Row Name 18 0900             Subjective Pain    Able to rate subjective pain?  yes  -KD      Pre-Treatment Pain Level  0  -KD      Post-Treatment Pain Level  0  -KD         Subjective Comments    Subjective Comments  States he's going to wear stockings with bandages on top during  holiday.  -KD         Manual Lymphatic Drainage    Manual Lymphatic Drainage Comments  B inguinal, B LE's  -KD         Compression/Skin Care    Compression/Skin Care  remove bandages  -KD      Skin Care  washed/dried;moisturizing lotion applied Eucerin, zinc oxide on red spots  -KD      Wrapping Location  lower  extremity  -KD      Wrapping Location LE  bilateral:;toes;foot to knee  -KD      Wrapping Comments  Tg9, Transleast on toes, Artiflex X 2 on right/piece of Rosidal Soft on top of foot with Artiflex X 1 left, also Rosidal Soft ankle to knee on left, Short stretch bandages 1-8cm and 3-10cm.  -KD        User Key  (r) = Recorded By, (t) = Taken By, (c) = Cosigned By    Initials Name Provider Type    Ceci Esposito, PT Physical Therapist                        PT Assessment/Plan     Row Name 12/18/18 1001          PT Assessment    Assessment Comments  Doing well. Will measure 12/20 which should also help to determine if foam padding is helping to decrease edema in left leg.  -KD        PT Plan    PT Plan Comments  Cont. Measure 12/20.  -KD       User Key  (r) = Recorded By, (t) = Taken By, (c) = Cosigned By    Initials Name Provider Type    Ceci Esposito, PT Physical Therapist               Exercises     Row Name 12/18/18 1003 12/18/18 0900          Subjective Comments    Subjective Comments  --  States he's going to wear stockings with bandages on top during [place] holiday.  -KD        Subjective Pain    Able to rate subjective pain?  --  yes  -KD     Pre-Treatment Pain Level  --  0  -KD     Post-Treatment Pain Level  --  0  -KD        Total Minutes    92910 - PT Manual Therapy Minutes  73  -KD  --       User Key  (r) = Recorded By, (t) = Taken By, (c) = Cosigned By    Initials Name Provider Type    Ceci Esposito, PT Physical Therapist                        PT OP Goals     Row Name 12/18/18 0900          PT Short Term Goals    STG Date to Achieve  12/17/18  -KD     STG 1  Pt demo awareness of condition and precautions for improved prevention, management, care of symptoms, and ease of transition to self-care of condition.  -KD     STG 1 Progress  Met  -KD     STG 2  Pt/family independent with self-wrapping techniques of compression bandages as indicated for improved self-management of condition.  -KD      STG 2 Progress  Ongoing  -KD     STG 3  Pt demo decreased net edema of >/=5-10cm for decreased edema symptoms, decreased risk of infection, and improved skin care.  -KD     STG 3 Progress  Progressing  -KD        Long Term Goals    LTG Date to Achieve  01/02/19  -KD     LTG 1  Pt/family independent with self care techniques for self-management of condition.  -KD     LTG 1 Progress  Ongoing  -KD     LTG 2  Pt demo decreased net edema of >/=10-20cm for decreased edema symptoms, decreased risk of infection, and improved skin care.  -KD     LTG 2 Progress  Ongoing  -KD     LTG 3  Pt/family independent with compression garments as indicated for self-management of condition.  -KD     LTG 3 Progress  Ongoing  -KD        Time Calculation    PT Goal Re-Cert Due Date  12/14/18  -KD       User Key  (r) = Recorded By, (t) = Taken By, (c) = Cosigned By    Initials Name Provider Type    Ceci Esposito, PT Physical Therapist          Therapy Education  Education Details: Reviewed treatment schedule for next week and compression wear schedule.  Given: Edema management  Program: Reinforced  How Provided: Verbal  Provided to: Patient  Level of Understanding: Verbalized              Time Calculation:   Start Time: 0832  Stop Time: 0945  Time Calculation (min): 73 min  Total Timed Code Minutes- PT: 73 minute(s)   Therapy Suggested Charges     Code   Minutes Charges    71928 (CPT®) Hc Pt Neuromusc Re Education Ea 15 Min      08001 (CPT®) Hc Pt Ther Proc Ea 15 Min      35386 (CPT®) Hc Gait Training Ea 15 Min      05704 (CPT®) Hc Pt Therapeutic Act Ea 15 Min      05922 (CPT®) Hc Pt Manual Therapy Ea 15 Min 73 5    77344 (CPT®) Hc Pt Ther Massage- Per 15 Min      86535 (CPT®) Hc Pt Iontophoresis Ea 15 Min      86597 (CPT®) Hc Pt Elec Stim Ea-Per 15 Min      69473 (CPT®) Hc Pt Ultrasound Ea 15 Min      38081 (CPT®) Hc Pt Self Care/Mgmt/Train Ea 15 Min      23740 (CPT®) Hc Pt Prosthetic (S) Train Initial Encounter, Each 15 Min       88897 (CPT®) Hc Orthotic(S) Mgmt/Train Initial Encounter, Each 15min      29170 (CPT®) Hc Pt Aquatic Therapy Ea 15 Min      57274 (CPT®) Hc Pt Orthotic(S)/Prosthetic(S) Encounter, Each 15 Min       (CPT®) Hc Pt Electrical Stim Unattended      Total  73 5        Therapy Charges for Today     Code Description Service Date Service Provider Modifiers Qty    67523898023 HC PT MOBILITY CURRENT 12/18/2018 Ceci Ruby, PT GP, CJ 1    27451653500 HC PT MOBILITY PROJECTED 12/18/2018 Ceci Ruby, PT GP, CI 1    51596108093 HC PT MANUAL THERAPY EA 15 MIN 12/18/2018 Ceci Ruby, PT GP 5          PT G-Codes  PT Professional Judgement Used?: Yes  Mobility: Walking and Moving Around Current Status (): At least 20 percent but less than 40 percent impaired, limited or restricted  Mobility: Walking and Moving Around Goal Status (): At least 1 percent but less than 20 percent impaired, limited or restricted         Ceci Ruby, MANDA  12/18/2018

## 2018-12-19 ENCOUNTER — HOSPITAL ENCOUNTER (OUTPATIENT)
Dept: PHYSICAL THERAPY | Facility: HOSPITAL | Age: 68
Setting detail: THERAPIES SERIES
Discharge: HOME | End: 2018-12-19

## 2018-12-19 DIAGNOSIS — I89.0 LYMPHEDEMA: Primary | ICD-10-CM

## 2018-12-19 PROCEDURE — 97140 MANUAL THERAPY 1/> REGIONS: CPT

## 2018-12-19 NOTE — THERAPY TREATMENT NOTE
Outpatient Physical Therapy Lymphedema Treatment Note  [facility]     Patient Name: Jose Hurtado  : 1950  MRN: [id number]  Today's Date: 2018        Visit Date: 2018    Visit Dx:    ICD-10-CM ICD-9-CM   1. Lymphedema I89.0 457.1       Patient Active Problem List   Diagnosis   • Adenocarcinoma of esophagus (CMS/HCC)   • Adenomatous polyp of colon   • Anemia   • Duodenal ulcer   • Insomnia   • Malignant neoplasm of prostate (CMS/HCC)   • RLS (restless legs syndrome)   • Thrombophlebitis of deep veins of lower extremity (CMS/HCC)   • Ventral hernia without obstruction or gangrene   • Incisional hernia, without obstruction or gangrene   • Hernia, incisional   • Osteoarthrosis, localized, primary, knee   • BPH (benign prostatic hyperplasia)   • Stasis dermatitis   • Medicare annual wellness visit, subsequent   • Reactive depression   • DVT (deep venous thrombosis) (CMS/HCC)   • Hammer toes of both feet   • Other hammer toe(s) (acquired), left foot   • Status post left foot surgery   • Surgery follow-up-Left second third and fourth distal interphalangeal joint resection with flexor tenotomy - Left   • Other hyperlipidemia   • Primary osteoarthritis of right knee   • OA (osteoarthritis) of knee   • Hypertension   • Anti-phospholipid syndrome (CMS/HCC)        Lymphedema     Row Name 18 1000             Subjective Pain    Able to rate subjective pain?  yes  -PC      Pre-Treatment Pain Level  0  -PC      Post-Treatment Pain Level  0  -PC         Subjective Comments    Subjective Comments  States he can tell a difference with the foam.  -PC         Manual Lymphatic Drainage    Manual Lymphatic Drainage Comments  B inguinal, B LE's  -PC         Compression/Skin Care    Compression/Skin Care  remove bandages  -PC      Skin Care  washed/dried;moisturizing lotion applied Eucerin  -PC      Wrapping Location  lower extremity  -PC      Wrapping Location LE  bilateral:;toes;foot to knee  -PC       Wrapping Comments  Tg9, Transleast on toes, Artiflex X 2 on right/piece of Rosidal Soft on top of foot with Artiflex X 1 left, also Rosidal Soft ankle to knee on left, Short stretch bandages 1-8cm and 3-10cm.  -PC        User Key  (r) = Recorded By, (t) = Taken By, (c) = Cosigned By    Initials Name Provider Type    Gogo Carroll, PT Physical Therapist                        PT Assessment/Plan     Row Name 12/19/18 1023          PT Assessment    Assessment Comments  Left leg appears smaller after using the foam under the bandages.  -PC        PT Plan    PT Plan Comments  Cont.  -PC       User Key  (r) = Recorded By, (t) = Taken By, (c) = Cosigned By    Initials Name Provider Type    Gogo Carroll, PT Physical Therapist               Exercises     Row Name 12/19/18 1029 12/19/18 1000          Subjective Comments    Subjective Comments  --  States he can tell a difference with the foam.  -PC        Subjective Pain    Able to rate subjective pain?  --  yes  -PC     Pre-Treatment Pain Level  --  0  -PC     Post-Treatment Pain Level  --  0  -PC        Total Minutes    11795 - PT Manual Therapy Minutes  80  -PC  --       User Key  (r) = Recorded By, (t) = Taken By, (c) = Cosigned By    Initials Name Provider Type    Gogo Carroll, PT Physical Therapist                            Therapy Education  Given: Symptoms/condition management  Program: Reinforced  How Provided: Verbal  Provided to: Patient  Level of Understanding: Verbalized              Time Calculation:   Start Time: 0830  Stop Time: 0950  Time Calculation (min): 80 min  Total Timed Code Minutes- PT: 80 minute(s)   Therapy Suggested Charges     Code   Minutes Charges    59505 (CPT®) Hc Pt Neuromusc Re Education Ea 15 Min      10112 (CPT®) Hc Pt Ther Proc Ea 15 Min      68654 (CPT®) Hc Gait Training Ea 15 Min      97162 (CPT®) Hc Pt Therapeutic Act Ea 15 Min      81738 (CPT®) Hc Pt Manual Therapy Ea 15 Min 80 5    92120 (CPT®) Hc Pt Ther Massage-  Per 15 Min      92677 (CPT®) Hc Pt Iontophoresis Ea 15 Min      86154 (CPT®) Hc Pt Elec Stim Ea-Per 15 Min      67894 (CPT®) Hc Pt Ultrasound Ea 15 Min      65480 (CPT®) Hc Pt Self Care/Mgmt/Train Ea 15 Min      63956 (CPT®) Hc Pt Prosthetic (S) Train Initial Encounter, Each 15 Min      04533 (CPT®) Hc Orthotic(S) Mgmt/Train Initial Encounter, Each 15min      86311 (CPT®) Hc Pt Aquatic Therapy Ea 15 Min      90486 (CPT®) Hc Pt Orthotic(S)/Prosthetic(S) Encounter, Each 15 Min       (CPT®) Hc Pt Electrical Stim Unattended      Total  80 5        Therapy Charges for Today     Code Description Service Date Service Provider Modifiers Qty    11042893557 HC PT MANUAL THERAPY EA 15 MIN 12/19/2018 Gogo Mei, PT GP 5                    Gogo Mei, PT  12/19/2018

## 2018-12-20 ENCOUNTER — HOSPITAL ENCOUNTER (OUTPATIENT)
Dept: PHYSICAL THERAPY | Facility: HOSPITAL | Age: 68
Setting detail: THERAPIES SERIES
Discharge: HOME | End: 2018-12-20

## 2018-12-20 DIAGNOSIS — I89.0 LYMPHEDEMA: Primary | ICD-10-CM

## 2018-12-20 PROCEDURE — 97140 MANUAL THERAPY 1/> REGIONS: CPT

## 2018-12-20 NOTE — THERAPY TREATMENT NOTE
Outpatient Physical Therapy Lymphedema Treatment Note  [facility]     Patient Name: Jose Hurtado  : 1950  MRN: [id number]  Today's Date: 2018        Visit Date: 2018    Visit Dx:    ICD-10-CM ICD-9-CM   1. Lymphedema I89.0 457.1       Patient Active Problem List   Diagnosis   • Adenocarcinoma of esophagus (CMS/HCC)   • Adenomatous polyp of colon   • Anemia   • Duodenal ulcer   • Insomnia   • Malignant neoplasm of prostate (CMS/HCC)   • RLS (restless legs syndrome)   • Thrombophlebitis of deep veins of lower extremity (CMS/HCC)   • Ventral hernia without obstruction or gangrene   • Incisional hernia, without obstruction or gangrene   • Hernia, incisional   • Osteoarthrosis, localized, primary, knee   • BPH (benign prostatic hyperplasia)   • Stasis dermatitis   • Medicare annual wellness visit, subsequent   • Reactive depression   • DVT (deep venous thrombosis) (CMS/HCC)   • Hammer toes of both feet   • Other hammer toe(s) (acquired), left foot   • Status post left foot surgery   • Surgery follow-up-Left second third and fourth distal interphalangeal joint resection with flexor tenotomy - Left   • Other hyperlipidemia   • Primary osteoarthritis of right knee   • OA (osteoarthritis) of knee   • Hypertension   • Anti-phospholipid syndrome (CMS/HCC)        Lymphedema     Row Name 18 1100             Subjective Pain    Able to rate subjective pain?  yes  -KD      Pre-Treatment Pain Level  0  -KD      Post-Treatment Pain Level  0  -KD         Subjective Comments    Subjective Comments  States he thinks his left leg is a bit better since using the foam.  -KD         Lymphedema Measurements    Measurement Type(s)  Circumferential  -KD      Circumferential Areas  Lower extremities  -KD         BLE Circumferential (cm)    Measurement Location 1  Knee  -KD      Left 1  44.5 cm  -KD      Right 1  45 cm  -KD      Measurement Location 2  10cm below knee  -KD      Left 2  41.5 cm  -KD      Right 2   40 cm  -KD      Measurement Location 3  20cm below knee  -KD      Left 3  43.5 cm  -KD      Right 3  40.5 cm  -KD      Measurement Location 4  30cm below knee  -KD      Left 4  37.5 cm  -KD      Right 4  33 cm  -KD      Measurement Location 5  Ankle  -KD      Left 5  31 cm  -KD      Right 5  29 cm  -KD      Measurement Location 6  Midfoot  -KD      Left 6  25.5 cm  -KD      Right 6  24.7 cm  -KD      LLE Circumferential Total  223.5 cm  -KD      RLE Circumferential Total  212.2 cm  -KD         Manual Lymphatic Drainage    Manual Lymphatic Drainage Comments  B inguinal, B LE's  -KD         Compression/Skin Care    Compression/Skin Care  remove bandages  -KD      Skin Care  washed/dried;moisturizing lotion applied Eucerin  -KD      Wrapping Location  lower extremity  -KD      Wrapping Location LE  bilateral:;toes;foot to knee  -KD      Wrapping Comments  Tg9, Transleast on toes, Artiflex X 2 on right/piece of Rosidal Soft on top of foot with Artiflex X 1 left, also Rosidal Soft ankle to knee on left, Short stretch bandages 1-8cm and 3-10cm.  -KD        User Key  (r) = Recorded By, (t) = Taken By, (c) = Cosigned By    Initials Name Provider Type    Ceci Esposito, PT Physical Therapist                        PT Assessment/Plan     Row Name 12/20/18 1143          PT Assessment    Assessment Comments  Measurements decreased by 3 cm more on the left compared to last week. Total decrease of 6.5cm on left, 5.3cm on right.  -KD        PT Plan    PT Plan Comments  Cont.  -KD       User Key  (r) = Recorded By, (t) = Taken By, (c) = Cosigned By    Initials Name Provider Type    Ceci Esposito, PT Physical Therapist               Exercises     Row Name 12/20/18 1140 12/20/18 1100          Subjective Comments    Subjective Comments  --  States he thinks his left leg is a bit better since using the foam.  -KD        Subjective Pain    Able to rate subjective pain?  --  yes  -KD     Pre-Treatment Pain Level  --  0  -KD      Post-Treatment Pain Level  --  0  -KD        Total Minutes    90487 - PT Manual Therapy Minutes  74  -KD  --       User Key  (r) = Recorded By, (t) = Taken By, (c) = Cosigned By    Initials Name Provider Type    Ceci Esposito PT Physical Therapist                        PT OP Goals     Row Name 12/20/18 1100          PT Short Term Goals    STG Date to Achieve  12/17/18  -KD     STG 1  Pt demo awareness of condition and precautions for improved prevention, management, care of symptoms, and ease of transition to self-care of condition.  -KD     STG 1 Progress  Met  -KD     STG 2  Pt/family independent with self-wrapping techniques of compression bandages as indicated for improved self-management of condition.  -KD     STG 2 Progress  Ongoing  -KD     STG 3  Pt demo decreased net edema of >/=5-10cm for decreased edema symptoms, decreased risk of infection, and improved skin care.  -KD     STG 3 Progress  Met  -KD        Long Term Goals    LTG Date to Achieve  01/02/19  -KD     LTG 1  Pt/family independent with self care techniques for self-management of condition.  -KD     LTG 1 Progress  Ongoing  -KD     LTG 2  Pt demo decreased net edema of >/=10-20cm for decreased edema symptoms, decreased risk of infection, and improved skin care.  -KD     LTG 2 Progress  Ongoing  -KD     LTG 3  Pt/family independent with compression garments as indicated for self-management of condition.  -KD     LTG 3 Progress  Ongoing  -KD     LTG 5 Progress  -- not measured but improved  -KD       User Key  (r) = Recorded By, (t) = Taken By, (c) = Cosigned By    Initials Name Provider Type    Ceci Esposito PT Physical Therapist          Therapy Education  Education Details: Discussed compression wear over the next few days.  Given: Edema management, Symptoms/condition management  Program: Reinforced  How Provided: Verbal  Provided to: Patient  Level of Understanding: Verbalized              Time Calculation:   Start Time: 0833  Stop  Time: 0947  Time Calculation (min): 74 min  Total Timed Code Minutes- PT: 74 minute(s)   Therapy Suggested Charges     Code   Minutes Charges    50551 (CPT®) Hc Pt Neuromusc Re Education Ea 15 Min      91708 (CPT®) Hc Pt Ther Proc Ea 15 Min      66121 (CPT®) Hc Gait Training Ea 15 Min      31650 (CPT®) Hc Pt Therapeutic Act Ea 15 Min      21375 (CPT®) Hc Pt Manual Therapy Ea 15 Min 74 5    37073 (CPT®) Hc Pt Ther Massage- Per 15 Min      50506 (CPT®) Hc Pt Iontophoresis Ea 15 Min      07311 (CPT®) Hc Pt Elec Stim Ea-Per 15 Min      75892 (CPT®) Hc Pt Ultrasound Ea 15 Min      68062 (CPT®) Hc Pt Self Care/Mgmt/Train Ea 15 Min      04193 (CPT®) Hc Pt Prosthetic (S) Train Initial Encounter, Each 15 Min      75576 (CPT®) Hc Orthotic(S) Mgmt/Train Initial Encounter, Each 15min      78032 (CPT®) Hc Pt Aquatic Therapy Ea 15 Min      82746 (CPT®) Hc Pt Orthotic(S)/Prosthetic(S) Encounter, Each 15 Min       (CPT®) Hc Pt Electrical Stim Unattended      Total  74 5        Therapy Charges for Today     Code Description Service Date Service Provider Modifiers Qty    52095212041 HC PT MANUAL THERAPY EA 15 MIN 12/20/2018 Ceci Ruby, PT KX, GP 5                    Ceci Ruby, PT  12/20/2018

## 2018-12-21 ENCOUNTER — HOSPITAL ENCOUNTER (OUTPATIENT)
Dept: PHYSICAL THERAPY | Facility: HOSPITAL | Age: 68
Setting detail: THERAPIES SERIES
Discharge: HOME | End: 2018-12-21

## 2018-12-21 DIAGNOSIS — I89.0 LYMPHEDEMA: Primary | ICD-10-CM

## 2018-12-21 PROCEDURE — 97140 MANUAL THERAPY 1/> REGIONS: CPT

## 2018-12-21 NOTE — THERAPY TREATMENT NOTE
Outpatient Physical Therapy Lymphedema Treatment Note  [facility]     Patient Name: Jose Hurtado  : 1950  MRN: [id number]  Today's Date: 2018        Visit Date: 2018    Visit Dx:    ICD-10-CM ICD-9-CM   1. Lymphedema I89.0 457.1       Patient Active Problem List   Diagnosis   • Adenocarcinoma of esophagus (CMS/HCC)   • Adenomatous polyp of colon   • Anemia   • Duodenal ulcer   • Insomnia   • Malignant neoplasm of prostate (CMS/HCC)   • RLS (restless legs syndrome)   • Thrombophlebitis of deep veins of lower extremity (CMS/HCC)   • Ventral hernia without obstruction or gangrene   • Incisional hernia, without obstruction or gangrene   • Hernia, incisional   • Osteoarthrosis, localized, primary, knee   • BPH (benign prostatic hyperplasia)   • Stasis dermatitis   • Medicare annual wellness visit, subsequent   • Reactive depression   • DVT (deep venous thrombosis) (CMS/HCC)   • Hammer toes of both feet   • Other hammer toe(s) (acquired), left foot   • Status post left foot surgery   • Surgery follow-up-Left second third and fourth distal interphalangeal joint resection with flexor tenotomy - Left   • Other hyperlipidemia   • Primary osteoarthritis of right knee   • OA (osteoarthritis) of knee   • Hypertension   • Anti-phospholipid syndrome (CMS/HCC)        Lymphedema     Row Name 18 1100             Subjective Pain    Able to rate subjective pain?  yes  -PC      Pre-Treatment Pain Level  0  -PC      Post-Treatment Pain Level  0  -PC         Subjective Comments    Subjective Comments  Pt reports some itching ant left lower leg. States he will be out of town for the holidays.  Brought in a pr of stockings to see if they are ok to wear after taking off the bandages.  -PC         Skin Changes/Observations    Skin Observations Comment  Noted small light red dots ant both lower legs.  -PC         Manual Lymphatic Drainage    Manual Lymphatic Drainage Comments  B inguinal, B LE's  -PC          Compression/Skin Care    Compression/Skin Care  remove bandages  -PC      Skin Care  washed/dried;moisturizing lotion applied Eucerin, HCC to itchy areas left, zinc oxide to red areas  -PC      Wrapping Location  lower extremity  -PC      Wrapping Location LE  bilateral:;toes;foot to knee  -PC      Wrapping Comments  Tg9, Transleast on toes, Artiflex X 2 on right/piece of Rosidal Soft on top of foot with Artiflex X 1 left, also Rosidal Soft ankle to knee on left, Short stretch bandages 1-8cm and 3-10cm.  -PC      Compression/Skin Care Comments  Before bandaging- Applied pt's stockings and wrapped 2 bandages over top to show pt what to do over the holidays when he removes bandages.  -PC        User Key  (r) = Recorded By, (t) = Taken By, (c) = Cosigned By    Initials Name Provider Type    PC Gogo Mei, PT Physical Therapist                        PT Assessment/Plan     Row Name 12/21/18 1122 12/20/18 1143       PT Assessment    Assessment Comments  Pt has some red spots on legs today (petechiae?).  Applied zinc oxide and used HCC on the itchy areas.  -PC  Measurements decreased by 3 cm more on the left compared to last week. Total decrease of 6.5cm on left, 5.3cm on right.  -KD       PT Plan    PT Plan Comments  Cont.  -PC  Cont.  -KD      User Key  (r) = Recorded By, (t) = Taken By, (c) = Cosigned By    Initials Name Provider Type    PC Gogo Mei, PT Physical Therapist    KD Ceci Ruby, PT Physical Therapist               Exercises     Row Name 12/21/18 1127 12/21/18 1100 12/20/18 1145       Subjective Comments    Subjective Comments  --  Pt reports some itching ant left lower leg. States he will be out of town for the holidays.  Brought in a pr of stockings to see if they are ok to wear after taking off the bandages.  -PC  --       Subjective Pain    Able to rate subjective pain?  --  yes  -PC  --    Pre-Treatment Pain Level  --  0  -PC  --    Post-Treatment Pain Level  --  0  -PC  --       Total  Minutes    33292 - PT Manual Therapy Minutes  72  -PC  --  74  -KD      User Key  (r) = Recorded By, (t) = Taken By, (c) = Cosigned By    Initials Name Provider Type    PC Gogo Mei, PT Physical Therapist    Ceci Esposito, MANDA Physical Therapist                            Therapy Education  Education Details: Showed pt how to apply 2 bandages over the compression stockings.  He plans to leave bandages on a couple days and then remove and use combo of stockings/bandages.  Given: Bandaging/dressing change  Program: New  How Provided: Verbal, Demonstration  Provided to: Patient  Level of Understanding: Verbalized              Time Calculation:   Start Time: 0827  Stop Time: 0939  Time Calculation (min): 72 min  Total Timed Code Minutes- PT: 72 minute(s)   Therapy Suggested Charges     Code   Minutes Charges    48606 (CPT®) Hc Pt Neuromusc Re Education Ea 15 Min      22809 (CPT®) Hc Pt Ther Proc Ea 15 Min      69395 (CPT®) Hc Gait Training Ea 15 Min      52379 (CPT®) Hc Pt Therapeutic Act Ea 15 Min      59586 (CPT®) Hc Pt Manual Therapy Ea 15 Min 72 5    25411 (CPT®) Hc Pt Ther Massage- Per 15 Min      66505 (CPT®) Hc Pt Iontophoresis Ea 15 Min      58489 (CPT®) Hc Pt Elec Stim Ea-Per 15 Min      74020 (CPT®) Hc Pt Ultrasound Ea 15 Min      42894 (CPT®) Hc Pt Self Care/Mgmt/Train Ea 15 Min      06086 (CPT®) Hc Pt Prosthetic (S) Train Initial Encounter, Each 15 Min      94545 (CPT®) Hc Orthotic(S) Mgmt/Train Initial Encounter, Each 15min      16016 (CPT®) Hc Pt Aquatic Therapy Ea 15 Min      43734 (CPT®) Hc Pt Orthotic(S)/Prosthetic(S) Encounter, Each 15 Min       (CPT®) Hc Pt Electrical Stim Unattended      Total  72 5        Therapy Charges for Today     Code Description Service Date Service Provider Modifiers Qty    56693019106 HC PT MANUAL THERAPY EA 15 MIN 12/21/2018 Gogo Mei, PT GP, KX 5                    Gogo Mei, PT  12/21/2018

## 2018-12-27 ENCOUNTER — HOSPITAL ENCOUNTER (OUTPATIENT)
Dept: PHYSICAL THERAPY | Facility: HOSPITAL | Age: 68
Setting detail: THERAPIES SERIES
Discharge: HOME | End: 2018-12-27

## 2018-12-27 DIAGNOSIS — I89.0 LYMPHEDEMA: Primary | ICD-10-CM

## 2018-12-27 PROCEDURE — 97140 MANUAL THERAPY 1/> REGIONS: CPT

## 2018-12-27 NOTE — THERAPY TREATMENT NOTE
Outpatient Physical Therapy Lymphedema Treatment Note  [facility]     Patient Name: Jose Hurtado  : 1950  MRN: [id number]  Today's Date: 2018        Visit Date: 2018    Visit Dx:    ICD-10-CM ICD-9-CM   1. Lymphedema I89.0 457.1       Patient Active Problem List   Diagnosis   • Adenocarcinoma of esophagus (CMS/HCC)   • Adenomatous polyp of colon   • Anemia   • Duodenal ulcer   • Insomnia   • Malignant neoplasm of prostate (CMS/HCC)   • RLS (restless legs syndrome)   • Thrombophlebitis of deep veins of lower extremity (CMS/HCC)   • Ventral hernia without obstruction or gangrene   • Incisional hernia, without obstruction or gangrene   • Hernia, incisional   • Osteoarthrosis, localized, primary, knee   • BPH (benign prostatic hyperplasia)   • Stasis dermatitis   • Medicare annual wellness visit, subsequent   • Reactive depression   • DVT (deep venous thrombosis) (CMS/HCC)   • Hammer toes of both feet   • Other hammer toe(s) (acquired), left foot   • Status post left foot surgery   • Surgery follow-up-Left second third and fourth distal interphalangeal joint resection with flexor tenotomy - Left   • Other hyperlipidemia   • Primary osteoarthritis of right knee   • OA (osteoarthritis) of knee   • Hypertension   • Anti-phospholipid syndrome (CMS/HCC)        Lymphedema     Row Name 18 1100             Subjective Pain    Able to rate subjective pain?  yes  -KD      Pre-Treatment Pain Level  0  -KD      Post-Treatment Pain Level  0  -KD         Subjective Comments    Subjective Comments  States he left the bandages on for 6 days, syas left leg looks better than it has for along time.  -KD         Skin Changes/Observations    Skin Observations Comment  No red itchy areas noted today  -KD         Lymphedema Measurements    Measurement Type(s)  Circumferential  -KD      Circumferential Areas  Lower extremities  -KD         BLE Circumferential (cm)    Measurement Location 1  Knee  -KD      Left  1  45 cm  -KD      Right 1  45.5 cm  -KD      Measurement Location 2  10cm below knee  -KD      Left 2  42 cm  -KD      Right 2  41 cm  -KD      Measurement Location 3  20cm below knee  -KD      Left 3  42.7 cm  -KD      Right 3  40.5 cm  -KD      Measurement Location 4  30cm below knee  -KD      Left 4  36.3 cm  -KD      Right 4  33.5 cm  -KD      Measurement Location 5  Ankle  -KD      Left 5  31 cm  -KD      Right 5  28.8 cm  -KD      Measurement Location 6  Midfoot  -KD      Left 6  26 cm  -KD      Right 6  24.7 cm  -KD      LLE Circumferential Total  223 cm  -KD      RLE Circumferential Total  214 cm  -KD         Manual Lymphatic Drainage    Manual Lymphatic Drainage Comments  B inguinal, B LE's  -KD         Compression/Skin Care    Compression/Skin Care  remove bandages  -KD      Skin Care  washed/dried;moisturizing lotion applied Eucerin  -KD      Wrapping Location  lower extremity  -KD      Wrapping Location LE  bilateral:;toes;foot to knee  -KD      Wrapping Comments  Tg9, Transleast on toes, Artiflex X 2 on right/piece of Rosidal Soft on top of foot with Artiflex X 1 left, also Rosidal Soft ankle to knee on left, Short stretch bandages 1-8cm and 3-10cm.  -KD        User Key  (r) = Recorded By, (t) = Taken By, (c) = Cosigned By    Initials Name Provider Type    Ceci Esposito, PT Physical Therapist                        PT Assessment/Plan     Row Name 12/27/18 1111          PT Assessment    Assessment Comments  Skin looks good today. Measurements have begun to plateau. Now discussing long term compression-- feel that he needs class 2 stockings to wear daily.  -KD        PT Plan    PT Plan Comments  Cont.  -KD       User Key  (r) = Recorded By, (t) = Taken By, (c) = Cosigned By    Initials Name Provider Type    Ceci Esposito, PT Physical Therapist               Exercises     Row Name 12/27/18 1113 12/27/18 1100          Subjective Comments    Subjective Comments  --  States he left the bandages on  for 6 days, ankita left leg looks better than it has for along time.  -KD        Subjective Pain    Able to rate subjective pain?  --  yes  -KD     Pre-Treatment Pain Level  --  0  -KD     Post-Treatment Pain Level  --  0  -KD        Total Minutes    69285 - PT Manual Therapy Minutes  72  -KD  --       User Key  (r) = Recorded By, (t) = Taken By, (c) = Cosigned By    Initials Name Provider Type    Ceci Esposito, PT Physical Therapist                            Therapy Education  Education Details: Discussed stockings--needs 30-40mmHg.  Given: Edema management  Program: Reinforced  How Provided: Verbal  Provided to: Patient  Level of Understanding: Verbalized              Time Calculation:   Start Time: 0832  Stop Time: 0944  Time Calculation (min): 72 min  Total Timed Code Minutes- PT: 72 minute(s)   Therapy Suggested Charges     Code   Minutes Charges    16106 (CPT®) Hc Pt Neuromusc Re Education Ea 15 Min      32407 (CPT®) Hc Pt Ther Proc Ea 15 Min      79112 (CPT®) Hc Gait Training Ea 15 Min      94827 (CPT®) Hc Pt Therapeutic Act Ea 15 Min      31662 (CPT®) Hc Pt Manual Therapy Ea 15 Min 72 5    56665 (CPT®) Hc Pt Ther Massage- Per 15 Min      29245 (CPT®) Hc Pt Iontophoresis Ea 15 Min      18497 (CPT®) Hc Pt Elec Stim Ea-Per 15 Min      75075 (CPT®) Hc Pt Ultrasound Ea 15 Min      30731 (CPT®) Hc Pt Self Care/Mgmt/Train Ea 15 Min      84069 (CPT®) Hc Pt Prosthetic (S) Train Initial Encounter, Each 15 Min      46188 (CPT®) Hc Orthotic(S) Mgmt/Train Initial Encounter, Each 15min      61517 (CPT®) Hc Pt Aquatic Therapy Ea 15 Min      98928 (CPT®) Hc Pt Orthotic(S)/Prosthetic(S) Encounter, Each 15 Min       (CPT®) Hc Pt Electrical Stim Unattended      Total  72 5        Therapy Charges for Today     Code Description Service Date Service Provider Modifiers Qty    71525528505 HC PT MANUAL THERAPY EA 15 MIN 12/27/2018 Ceci Ruby, PT KX, GP 5                    Ceci Ruby, PT  12/27/2018

## 2018-12-28 ENCOUNTER — HOSPITAL ENCOUNTER (OUTPATIENT)
Dept: PHYSICAL THERAPY | Facility: HOSPITAL | Age: 68
Setting detail: THERAPIES SERIES
Discharge: HOME | End: 2018-12-28

## 2018-12-28 DIAGNOSIS — I89.0 LYMPHEDEMA: Primary | ICD-10-CM

## 2018-12-28 LAB — INR PPP: 3 (ref 2–3)

## 2018-12-28 PROCEDURE — 97140 MANUAL THERAPY 1/> REGIONS: CPT

## 2018-12-28 NOTE — THERAPY TREATMENT NOTE
Outpatient Physical Therapy Lymphedema Treatment Note  [facility]     Patient Name: Jose Hurtado  : 1950  MRN: [id number]  Today's Date: 2018        Visit Date: 2018    Visit Dx:    ICD-10-CM ICD-9-CM   1. Lymphedema I89.0 457.1       Patient Active Problem List   Diagnosis   • Adenocarcinoma of esophagus (CMS/HCC)   • Adenomatous polyp of colon   • Anemia   • Duodenal ulcer   • Insomnia   • Malignant neoplasm of prostate (CMS/HCC)   • RLS (restless legs syndrome)   • Thrombophlebitis of deep veins of lower extremity (CMS/HCC)   • Ventral hernia without obstruction or gangrene   • Incisional hernia, without obstruction or gangrene   • Hernia, incisional   • Osteoarthrosis, localized, primary, knee   • BPH (benign prostatic hyperplasia)   • Stasis dermatitis   • Medicare annual wellness visit, subsequent   • Reactive depression   • DVT (deep venous thrombosis) (CMS/HCC)   • Hammer toes of both feet   • Other hammer toe(s) (acquired), left foot   • Status post left foot surgery   • Surgery follow-up-Left second third and fourth distal interphalangeal joint resection with flexor tenotomy - Left   • Other hyperlipidemia   • Primary osteoarthritis of right knee   • OA (osteoarthritis) of knee   • Hypertension   • Anti-phospholipid syndrome (CMS/HCC)        Lymphedema     Row Name 18 1100             Subjective Pain    Able to rate subjective pain?  yes  -PC      Pre-Treatment Pain Level  0  -PC      Post-Treatment Pain Level  0  -PC         Subjective Comments    Subjective Comments  States he has worn the [employer] stockings in the past and would like to stick with those.  -PC         Manual Lymphatic Drainage    Manual Lymphatic Drainage Comments  B inguinal, B LE's  -PC         Compression/Skin Care    Compression/Skin Care  remove bandages  -PC      Skin Care  washed/dried;moisturizing lotion applied  -PC      Wrapping Location  lower extremity  -PC      Wrapping Location LE   bilateral:;toes;foot to knee  -PC      Wrapping Comments  Tg9, Transleast on toes, Artiflex X 2 on right/piece of Rosidal Soft on top of foot with Artiflex X 1 left, also Rosidal Soft ankle to knee on left, Short stretch bandages 1-8cm and 3-10cm.  -PC        User Key  (r) = Recorded By, (t) = Taken By, (c) = Cosigned By    Initials Name Provider Type    Gogo Carroll, PT Physical Therapist                        PT Assessment/Plan     Row Name 12/28/18 1150          PT Assessment    Assessment Comments  Feel pt will benefit from one more week of therapy.  He will order stockings that should be in by end of next week.  -PC        PT Plan    PT Plan Comments  Cont x 1 week.  -PC       User Key  (r) = Recorded By, (t) = Taken By, (c) = Cosigned By    Initials Name Provider Type    Gogo Carroll, PT Physical Therapist               Exercises     Row Name 12/28/18 1155 12/28/18 1100          Subjective Comments    Subjective Comments  --  States he has worn the [employer] stockings in the past and would like to stick with those.  -PC        Subjective Pain    Able to rate subjective pain?  --  yes  -PC     Pre-Treatment Pain Level  --  0  -PC     Post-Treatment Pain Level  --  0  -PC        Total Minutes    37857 - PT Manual Therapy Minutes  60  -PC  --       User Key  (r) = Recorded By, (t) = Taken By, (c) = Cosigned By    Initials Name Provider Type    Gogo Carroll, PT Physical Therapist                            Therapy Education  Education Details: Discussed ordering stockings- pt has one new pair but will order several more.  ([employer] 30-40mmHg size large)  Given: Edema management  Program: Reinforced  How Provided: Verbal  Provided to: Patient  Level of Understanding: Verbalized              Time Calculation:   Start Time: 0835  Stop Time: 0935  Time Calculation (min): 60 min  Total Timed Code Minutes- PT: 60 minute(s)   Therapy Suggested Charges     Code   Minutes Charges    44236 (CPT®)  Hc Pt Neuromusc Re Education Ea 15 Min      93609 (CPT®) Hc Pt Ther Proc Ea 15 Min      49148 (CPT®) Hc Gait Training Ea 15 Min      69108 (CPT®) Hc Pt Therapeutic Act Ea 15 Min      99670 (CPT®) Hc Pt Manual Therapy Ea 15 Min 60 4    91445 (CPT®) Hc Pt Ther Massage- Per 15 Min      08482 (CPT®) Hc Pt Iontophoresis Ea 15 Min      07562 (CPT®) Hc Pt Elec Stim Ea-Per 15 Min      83426 (CPT®) Hc Pt Ultrasound Ea 15 Min      64528 (CPT®) Hc Pt Self Care/Mgmt/Train Ea 15 Min      71710 (CPT®) Hc Pt Prosthetic (S) Train Initial Encounter, Each 15 Min      15516 (CPT®) Hc Orthotic(S) Mgmt/Train Initial Encounter, Each 15min      89081 (CPT®) Hc Pt Aquatic Therapy Ea 15 Min      50866 (CPT®) Hc Pt Orthotic(S)/Prosthetic(S) Encounter, Each 15 Min       (CPT®) Hc Pt Electrical Stim Unattended      Total  60 4        Therapy Charges for Today     Code Description Service Date Service Provider Modifiers Qty    47750003918 HC PT MANUAL THERAPY EA 15 MIN 12/28/2018 Gogo Mei, PT KX, GP 4                    Gogo Mei, PT  12/28/2018

## 2018-12-31 ENCOUNTER — ANTICOAGULATION VISIT (OUTPATIENT)
Dept: INTERNAL MEDICINE | Age: 68
End: 2018-12-31

## 2018-12-31 ENCOUNTER — APPOINTMENT (OUTPATIENT)
Dept: PHYSICAL THERAPY | Facility: HOSPITAL | Age: 68
End: 2018-12-31

## 2019-01-02 ENCOUNTER — HOSPITAL ENCOUNTER (OUTPATIENT)
Dept: PHYSICAL THERAPY | Facility: HOSPITAL | Age: 69
Setting detail: THERAPIES SERIES
Discharge: HOME | End: 2019-01-02

## 2019-01-02 DIAGNOSIS — I89.0 LYMPHEDEMA: Primary | ICD-10-CM

## 2019-01-02 PROCEDURE — 97140 MANUAL THERAPY 1/> REGIONS: CPT

## 2019-01-02 NOTE — THERAPY TREATMENT NOTE
Outpatient Physical Therapy Lymphedema Treatment Note  [facility]     Patient Name: Jose Hurtado  : 1950  MRN: [id number]  Today's Date: 2019        Visit Date: 2019    Visit Dx:    ICD-10-CM ICD-9-CM   1. Lymphedema I89.0 457.1       Patient Active Problem List   Diagnosis   • Adenocarcinoma of esophagus (CMS/HCC)   • Adenomatous polyp of colon   • Anemia   • Duodenal ulcer   • Insomnia   • Malignant neoplasm of prostate (CMS/HCC)   • RLS (restless legs syndrome)   • Thrombophlebitis of deep veins of lower extremity (CMS/HCC)   • Ventral hernia without obstruction or gangrene   • Incisional hernia, without obstruction or gangrene   • Hernia, incisional   • Osteoarthrosis, localized, primary, knee   • BPH (benign prostatic hyperplasia)   • Stasis dermatitis   • Medicare annual wellness visit, subsequent   • Reactive depression   • DVT (deep venous thrombosis) (CMS/HCC)   • Hammer toes of both feet   • Other hammer toe(s) (acquired), left foot   • Status post left foot surgery   • Surgery follow-up-Left second third and fourth distal interphalangeal joint resection with flexor tenotomy - Left   • Other hyperlipidemia   • Primary osteoarthritis of right knee   • OA (osteoarthritis) of knee   • Hypertension   • Anti-phospholipid syndrome (CMS/HCC)        Lymphedema     Row Name 19 1000             Subjective Pain    Able to rate subjective pain?  yes  -PC      Pre-Treatment Pain Level  0  -PC      Post-Treatment Pain Level  0  -PC         Subjective Comments    Subjective Comments  States he was ill for a couple days but feeling better.  Kept bandages on since last visit.  -PC         Manual Lymphatic Drainage    Manual Lymphatic Drainage Comments  B inguinal, B LE's  -PC         Compression/Skin Care    Compression/Skin Care  remove bandages  -PC      Skin Care  washed/dried;moisturizing lotion applied  -PC      Wrapping Location  lower extremity  -PC      Wrapping Location LE   bilateral:;toes;foot to knee  -PC      Wrapping Comments  Tg9, Transleast on toes, Artiflex X 2 on right/piece of Rosidal Soft on top of foot with Artiflex X 1 left, also Rosidal Soft ankle to knee on left, Short stretch bandages 1-8cm and 3-10cm.  -PC      Compression/Skin Care Comments  Started 1 bandage on foot,  2nd on ankle with figure 8 around ankle, then last 2 at ankle.  -PC        User Key  (r) = Recorded By, (t) = Taken By, (c) = Cosigned By    Initials Name Provider Type    PC Gogo Mei, PT Physical Therapist                        PT Assessment/Plan     Row Name 01/02/19 1010          PT Assessment    Assessment Comments  Right lower leg edematous today, so started 3 bandages at ankle to increase pressure on lower leg.  -PC        PT Plan    PT Plan Comments  Cont this week.  -PC       User Key  (r) = Recorded By, (t) = Taken By, (c) = Cosigned By    Initials Name Provider Type    Gogo Carroll, PT Physical Therapist               Exercises     Row Name 01/02/19 1015 01/02/19 1000          Subjective Comments    Subjective Comments  --  States he was ill for a couple days but feeling better.  Kept bandages on since last visit.  -PC        Subjective Pain    Able to rate subjective pain?  --  yes  -PC     Pre-Treatment Pain Level  --  0  -PC     Post-Treatment Pain Level  --  0  -PC        Total Minutes    83444 - PT Manual Therapy Minutes  70  -PC  --       User Key  (r) = Recorded By, (t) = Taken By, (c) = Cosigned By    Initials Name Provider Type    Gogo Carroll, PT Physical Therapist                            Therapy Education  Education Details: Discussed wearing time for stockings- remove at night.  Given: Edema management  Program: Reinforced  How Provided: Verbal  Provided to: Patient  Level of Understanding: Verbalized              Time Calculation:   Start Time: 0835  Stop Time: 0945  Time Calculation (min): 70 min  Total Timed Code Minutes- PT: 70 minute(s)   Therapy  Suggested Charges     Code   Minutes Charges    30315 (CPT®) Hc Pt Neuromusc Re Education Ea 15 Min      77940 (CPT®) Hc Pt Ther Proc Ea 15 Min      95070 (CPT®) Hc Gait Training Ea 15 Min      06937 (CPT®) Hc Pt Therapeutic Act Ea 15 Min      11753 (CPT®) Hc Pt Manual Therapy Ea 15 Min 70 5    90109 (CPT®) Hc Pt Ther Massage- Per 15 Min      99769 (CPT®) Hc Pt Iontophoresis Ea 15 Min      93369 (CPT®) Hc Pt Elec Stim Ea-Per 15 Min      52636 (CPT®) Hc Pt Ultrasound Ea 15 Min      95765 (CPT®) Hc Pt Self Care/Mgmt/Train Ea 15 Min      81905 (CPT®) Hc Pt Prosthetic (S) Train Initial Encounter, Each 15 Min      55532 (CPT®) Hc Orthotic(S) Mgmt/Train Initial Encounter, Each 15min      97154 (CPT®) Hc Pt Aquatic Therapy Ea 15 Min      90661 (CPT®) Hc Pt Orthotic(S)/Prosthetic(S) Encounter, Each 15 Min       (CPT®) Hc Pt Electrical Stim Unattended      Total  70 5        Therapy Charges for Today     Code Description Service Date Service Provider Modifiers Qty    44745111294 HC PT MANUAL THERAPY EA 15 MIN 1/2/2019 Gogo Mei, PT KX, GP 5                    Gogo Mei, PT  1/2/2019

## 2019-01-03 ENCOUNTER — HOSPITAL ENCOUNTER (OUTPATIENT)
Dept: PHYSICAL THERAPY | Facility: HOSPITAL | Age: 69
Setting detail: THERAPIES SERIES
Discharge: HOME | End: 2019-01-03

## 2019-01-03 DIAGNOSIS — I89.0 LYMPHEDEMA: Primary | ICD-10-CM

## 2019-01-03 PROCEDURE — 97140 MANUAL THERAPY 1/> REGIONS: CPT

## 2019-01-03 NOTE — THERAPY TREATMENT NOTE
Outpatient Physical Therapy Lymphedema Treatment Note  [facility]     Patient Name: Jose Hurtado  : 1950  MRN: [id number]  Today's Date: 1/3/2019        Visit Date: 2019    Visit Dx:    ICD-10-CM ICD-9-CM   1. Lymphedema I89.0 457.1       Patient Active Problem List   Diagnosis   • Adenocarcinoma of esophagus (CMS/HCC)   • Adenomatous polyp of colon   • Anemia   • Duodenal ulcer   • Insomnia   • Malignant neoplasm of prostate (CMS/HCC)   • RLS (restless legs syndrome)   • Thrombophlebitis of deep veins of lower extremity (CMS/HCC)   • Ventral hernia without obstruction or gangrene   • Incisional hernia, without obstruction or gangrene   • Hernia, incisional   • Osteoarthrosis, localized, primary, knee   • BPH (benign prostatic hyperplasia)   • Stasis dermatitis   • Medicare annual wellness visit, subsequent   • Reactive depression   • DVT (deep venous thrombosis) (CMS/HCC)   • Hammer toes of both feet   • Other hammer toe(s) (acquired), left foot   • Status post left foot surgery   • Surgery follow-up-Left second third and fourth distal interphalangeal joint resection with flexor tenotomy - Left   • Other hyperlipidemia   • Primary osteoarthritis of right knee   • OA (osteoarthritis) of knee   • Hypertension   • Anti-phospholipid syndrome (CMS/HCC)        Lymphedema     Row Name 19 1400             Subjective Pain    Able to rate subjective pain?  yes  -KD      Pre-Treatment Pain Level  0  -KD      Post-Treatment Pain Level  0  -KD         Subjective Comments    Subjective Comments  States he has ordered several pairs of class 2 stockings.  -KD         Lymphedema Measurements    Measurement Type(s)  Circumferential  -KD      Circumferential Areas  Lower extremities  -KD         BLE Circumferential (cm)    Measurement Location 1  Knee  -KD      Left 1  46 cm  -KD      Right 1  45.5 cm  -KD      Measurement Location 2  10cm below knee  -KD      Left 2  42.5 cm  -KD      Right 2  42.5 cm   -KD      Measurement Location 3  20cm below knee  -KD      Left 3  44 cm  -KD      Right 3  42 cm  -KD      Measurement Location 4  30cm below knee  -KD      Left 4  37 cm  -KD      Right 4  35.5 cm  -KD      Measurement Location 5  Ankle  -KD      Left 5  30.5 cm  -KD      Right 5  30 cm  -KD      Measurement Location 6  Midfoot  -KD      Left 6  25 cm  -KD      Right 6  24.5 cm  -KD      LLE Circumferential Total  225 cm  -KD      RLE Circumferential Total  220 cm  -KD         Manual Lymphatic Drainage    Manual Lymphatic Drainage Comments  B inguinal, B LE's  -KD         Compression/Skin Care    Compression/Skin Care  remove bandages  -KD      Skin Care  washed/dried;moisturizing lotion applied  -KD      Wrapping Location  lower extremity  -KD      Wrapping Location LE  bilateral:;toes;foot to knee  -KD      Wrapping Comments  Tg9, Transleast on toes, Artiflex X 2 on right/piece of Rosidal Soft on top of foot with Artiflex X 1 left, also Rosidal Soft ankle to knee on left, Short stretch bandages 1-8cm and 3-10cm.  -KD      Compression/Skin Care Comments  Started 1 bandage on foot,  2nd on ankle with figure 8 around ankle, then last 2 at ankle.  -KD        User Key  (r) = Recorded By, (t) = Taken By, (c) = Cosigned By    Initials Name Provider Type    Ceci Esposito, PT Physical Therapist                        PT Assessment/Plan     Row Name 01/03/19 1438          PT Assessment    Assessment Comments  Measurements no better today--in fact, right leg up some overall. Legs have good contours.  -KD        PT Plan    PT Plan Comments  Cont X 1.  -KD       User Key  (r) = Recorded By, (t) = Taken By, (c) = Cosigned By    Initials Name Provider Type    Ceci Esposito, PT Physical Therapist               Exercises     Row Name 01/03/19 1440 01/03/19 1400          Subjective Comments    Subjective Comments  --  States he has ordered several pairs of class 2 stockings.  -KD        Subjective Pain    Able to rate  subjective pain?  --  yes  -KD     Pre-Treatment Pain Level  --  0  -KD     Post-Treatment Pain Level  --  0  -KD        Total Minutes    17092 - PT Manual Therapy Minutes  77  -KD  --       User Key  (r) = Recorded By, (t) = Taken By, (c) = Cosigned By    Initials Name Provider Type    Ceci Esposito PT Physical Therapist                        PT OP Goals     Row Name 01/03/19 1400          PT Short Term Goals    STG Date to Achieve  12/28/18  -KD     STG 1  Pt demo awareness of condition and precautions for improved prevention, management, care of symptoms, and ease of transition to self-care of condition.  -KD     STG 1 Progress  Met  -KD     STG 2  Pt/family independent with self-wrapping techniques of compression bandages as indicated for improved self-management of condition.  -KD     STG 2 Progress  Ongoing  -KD     STG 2 Progress Comments  pt has been instructed on wearing stocking with bandages applied over the stocking, but has not actually done this at home.  -KD     STG 3  Pt demo decreased net edema of >/=5-10cm for decreased edema symptoms, decreased risk of infection, and improved skin care.  -KD     STG 3 Progress  Met  -KD        Long Term Goals    LTG Date to Achieve  01/13/19  -KD     LTG 1  Pt/family independent with self care techniques for self-management of condition.  -KD     LTG 1 Progress  Met  -KD     LTG 2  Pt demo decreased net edema of >/=10-20cm for decreased edema symptoms, decreased risk of infection, and improved skin care.  -KD     LTG 2 Progress  Not Met  -KD     LTG 3  Pt/family independent with compression garments as indicated for self-management of condition.  -KD     LTG 3 Progress  Met  -KD       User Key  (r) = Recorded By, (t) = Taken By, (c) = Cosigned By    Initials Name Provider Type    Ceci Esposito PT Physical Therapist          Therapy Education  Education Details: Discussed wear and replacement of stockings.  Given: Edema management  Program:  Reinforced  How Provided: Verbal  Provided to: Patient  Level of Understanding: Verbalized              Time Calculation:   Start Time: 1300  Stop Time: 1417  Time Calculation (min): 77 min  Total Timed Code Minutes- PT: 77 minute(s)   Therapy Suggested Charges     Code   Minutes Charges    30868 (CPT®) Hc Pt Neuromusc Re Education Ea 15 Min      08465 (CPT®) Hc Pt Ther Proc Ea 15 Min      84229 (CPT®) Hc Gait Training Ea 15 Min      33119 (CPT®) Hc Pt Therapeutic Act Ea 15 Min      51099 (CPT®) Hc Pt Manual Therapy Ea 15 Min 77 5    04803 (CPT®) Hc Pt Ther Massage- Per 15 Min      90248 (CPT®) Hc Pt Iontophoresis Ea 15 Min      66504 (CPT®) Hc Pt Elec Stim Ea-Per 15 Min      72703 (CPT®) Hc Pt Ultrasound Ea 15 Min      90276 (CPT®) Hc Pt Self Care/Mgmt/Train Ea 15 Min      31332 (CPT®) Hc Pt Prosthetic (S) Train Initial Encounter, Each 15 Min      66665 (CPT®) Hc Orthotic(S) Mgmt/Train Initial Encounter, Each 15min      09325 (CPT®) Hc Pt Aquatic Therapy Ea 15 Min      75997 (CPT®) Hc Pt Orthotic(S)/Prosthetic(S) Encounter, Each 15 Min       (CPT®) Hc Pt Electrical Stim Unattended      Total  77 5        Therapy Charges for Today     Code Description Service Date Service Provider Modifiers Qty    31675963969 HC PT MANUAL THERAPY EA 15 MIN 1/3/2019 Ceci Ruby, PT GP 5                    Ceci Ruyb, PT  1/3/2019

## 2019-01-04 ENCOUNTER — HOSPITAL ENCOUNTER (OUTPATIENT)
Dept: PHYSICAL THERAPY | Facility: HOSPITAL | Age: 69
Setting detail: THERAPIES SERIES
Discharge: HOME | End: 2019-01-04

## 2019-01-04 DIAGNOSIS — I89.0 LYMPHEDEMA: Primary | ICD-10-CM

## 2019-01-04 PROCEDURE — 97140 MANUAL THERAPY 1/> REGIONS: CPT

## 2019-01-04 NOTE — THERAPY DISCHARGE NOTE
Outpatient Physical Therapy Lymphedema Treatment Note/Discharge Summary  [facility]     Patient Name: Jose Hurtado  : 1950  MRN: [id number]  Today's Date: 2019      Visit Date: 2019    Visit Dx:    ICD-10-CM ICD-9-CM   1. Lymphedema I89.0 457.1       Patient Active Problem List   Diagnosis   • Adenocarcinoma of esophagus (CMS/HCC)   • Adenomatous polyp of colon   • Anemia   • Duodenal ulcer   • Insomnia   • Malignant neoplasm of prostate (CMS/HCC)   • RLS (restless legs syndrome)   • Thrombophlebitis of deep veins of lower extremity (CMS/HCC)   • Ventral hernia without obstruction or gangrene   • Incisional hernia, without obstruction or gangrene   • Hernia, incisional   • Osteoarthrosis, localized, primary, knee   • BPH (benign prostatic hyperplasia)   • Stasis dermatitis   • Medicare annual wellness visit, subsequent   • Reactive depression   • DVT (deep venous thrombosis) (CMS/HCC)   • Hammer toes of both feet   • Other hammer toe(s) (acquired), left foot   • Status post left foot surgery   • Surgery follow-up-Left second third and fourth distal interphalangeal joint resection with flexor tenotomy - Left   • Other hyperlipidemia   • Primary osteoarthritis of right knee   • OA (osteoarthritis) of knee   • Hypertension   • Anti-phospholipid syndrome (CMS/HCC)        Lymphedema     Row Name 19 1000 19 1400          Subjective Pain    Able to rate subjective pain?  yes  -PC  yes  -KD     Pre-Treatment Pain Level  0  -PC  0  -KD     Post-Treatment Pain Level  0  -PC  0  -KD        Subjective Comments    Subjective Comments  States he feels legs are much better now.  -PC  States he has ordered several pairs of class 2 stockings.  -KD        Skin Changes/Observations    Skin Observations Comment  No redness, cont with min dryness.  -PC  --        Lymphedema Measurements    Measurement Type(s)  --  Circumferential  -KD     Circumferential Areas  --  Lower extremities  -KD        BLE  Circumferential (cm)    Measurement Location 1  --  Knee  -KD     Left 1  --  46 cm  -KD     Right 1  --  45.5 cm  -KD     Measurement Location 2  --  10cm below knee  -KD     Left 2  --  42.5 cm  -KD     Right 2  --  42.5 cm  -KD     Measurement Location 3  --  20cm below knee  -KD     Left 3  --  44 cm  -KD     Right 3  --  42 cm  -KD     Measurement Location 4  --  30cm below knee  -KD     Left 4  --  37 cm  -KD     Right 4  --  35.5 cm  -KD     Measurement Location 5  --  Ankle  -KD     Left 5  --  30.5 cm  -KD     Right 5  --  30 cm  -KD     Measurement Location 6  --  Midfoot  -KD     Left 6  --  25 cm  -KD     Right 6  --  24.5 cm  -KD     LLE Circumferential Total  --  225 cm  -KD     RLE Circumferential Total  --  220 cm  -KD        Manual Lymphatic Drainage    Manual Lymphatic Drainage Comments  B inguinal, B LE's  -PC  B inguinal, B LE's  -KD        Compression/Skin Care    Compression/Skin Care  remove bandages  -PC  remove bandages  -KD     Skin Care  washed/dried;moisturizing lotion applied Eucerin  -PC  washed/dried;moisturizing lotion applied  -KD     Wrapping Location  lower extremity  -PC  lower extremity  -KD     Wrapping Location LE  bilateral:;foot to knee  -PC  bilateral:;toes;foot to knee  -KD     Wrapping Comments  Tg9,  Artiflex X 2 on right/piece of Rosidal Soft on top of foot with Artiflex X 1 left, also Rosidal Soft ankle to knee on left, Short stretch bandages 1-8cm and 3-10cm.  -PC  Tg9, Transleast on toes, Artiflex X 2 on right/piece of Rosidal Soft on top of foot with Artiflex X 1 left, also Rosidal Soft ankle to knee on left, Short stretch bandages 1-8cm and 3-10cm.  -KD     Compression/Skin Care Comments  Started 1 bandage on foot,  2nd on ankle with figure 8 around ankle, then last 2 at ankle. Did not wrap toes today.  -PC  Started 1 bandage on foot,  2nd on ankle with figure 8 around ankle, then last 2 at ankle.  -KD       User Key  (r) = Recorded By, (t) = Taken By, (c) =  Cosigned By    Initials Name Provider Type    PC Gogo Mei, PT Physical Therapist    KD Ceci Ruby, PT Physical Therapist                        PT Assessment/Plan     Row Name 01/04/19 1011 01/03/19 1438       PT Assessment    Assessment Comments  Progress has plateaued, so pt ready for d/c.   -PC  Measurements no better today--in fact, right leg up some overall. Legs have good contours.  -KD       PT Plan    PT Plan Comments  D/C.  -PC  Cont X 1.  -KD      User Key  (r) = Recorded By, (t) = Taken By, (c) = Cosigned By    Initials Name Provider Type    PC Gogo Mei, PT Physical Therapist    KD Ceci Ruby, PT Physical Therapist               Exercises     Row Name 01/04/19 1013 01/04/19 1000 01/03/19 1440       Subjective Comments    Subjective Comments  --  States he feels legs are much better now.  -PC  --       Subjective Pain    Able to rate subjective pain?  --  yes  -PC  --    Pre-Treatment Pain Level  --  0  -PC  --    Post-Treatment Pain Level  --  0  -PC  --       Total Minutes    64059 - PT Manual Therapy Minutes  58  -PC  --  77  -KD    Row Name 01/03/19 1400             Subjective Comments    Subjective Comments  States he has ordered several pairs of class 2 stockings.  -KD         Subjective Pain    Able to rate subjective pain?  yes  -KD      Pre-Treatment Pain Level  0  -KD      Post-Treatment Pain Level  0  -KD        User Key  (r) = Recorded By, (t) = Taken By, (c) = Cosigned By    Initials Name Provider Type    PC Gogo Mei, PT Physical Therapist    Ceci Esposito, PT Physical Therapist                        PT OP Goals     Row Name 01/04/19 1000 01/03/19 1400       PT Short Term Goals    STG Date to Achieve  --  12/28/18  -KD    STG 1  Pt demo awareness of condition and precautions for improved prevention, management, care of symptoms, and ease of transition to self-care of condition.  -PC  Pt demo awareness of condition and precautions for improved prevention,  management, care of symptoms, and ease of transition to self-care of condition.  -KD    STG 1 Progress  Met  -PC  Met  -KD    STG 2  Pt/family independent with self-wrapping techniques of compression bandages as indicated for improved self-management of condition.  -PC  Pt/family independent with self-wrapping techniques of compression bandages as indicated for improved self-management of condition.  -KD    STG 2 Progress  Not Met  -PC  Ongoing  -KD    STG 2 Progress Comments  --  pt has been instructed on wearing stocking with bandages applied over the stocking, but has not actually done this at home.  -KD    STG 3  Pt demo decreased net edema of >/=5-10cm for decreased edema symptoms, decreased risk of infection, and improved skin care.  -PC  Pt demo decreased net edema of >/=5-10cm for decreased edema symptoms, decreased risk of infection, and improved skin care.  -KD    STG 3 Progress  Met  -PC  Met  -KD       Long Term Goals    LTG Date to Achieve  --  01/13/19  -KD    LTG 1  Pt/family independent with self care techniques for self-management of condition.  -PC  Pt/family independent with self care techniques for self-management of condition.  -KD    LTG 1 Progress  Met  -PC  Met  -KD    LTG 2  Pt demo decreased net edema of >/=10-20cm for decreased edema symptoms, decreased risk of infection, and improved skin care.  -PC  Pt demo decreased net edema of >/=10-20cm for decreased edema symptoms, decreased risk of infection, and improved skin care.  -KD    LTG 2 Progress  Not Met  -PC  Not Met  -KD    LTG 3  Pt/family independent with compression garments as indicated for self-management of condition.  -PC  Pt/family independent with compression garments as indicated for self-management of condition.  -KD    LTG 3 Progress  Met  -PC  Met  -KD      User Key  (r) = Recorded By, (t) = Taken By, (c) = Cosigned By    Initials Name Provider Type    Gogo Carroll, PT Physical Therapist    Ceci Esposito, MANDA  Physical Therapist          Therapy Education  Education Details: Reviewed wearing time for stockings, skin care, elevation.  Given: Edema management  Program: Reinforced  How Provided: Verbal  Provided to: Patient  Level of Understanding: Verbalized              Time Calculation:   Start Time: 0830  Stop Time: 0928  Time Calculation (min): 58 min  Total Timed Code Minutes- PT: 58 minute(s)   Therapy Suggested Charges     Code   Minutes Charges    22397 (CPT®) Hc Pt Neuromusc Re Education Ea 15 Min      85878 (CPT®) Hc Pt Ther Proc Ea 15 Min      29879 (CPT®) Hc Gait Training Ea 15 Min      25515 (CPT®) Hc Pt Therapeutic Act Ea 15 Min      49412 (CPT®) Hc Pt Manual Therapy Ea 15 Min 58 4    84991 (CPT®) Hc Pt Ther Massage- Per 15 Min      71837 (CPT®) Hc Pt Iontophoresis Ea 15 Min      37224 (CPT®) Hc Pt Elec Stim Ea-Per 15 Min      33356 (CPT®) Hc Pt Ultrasound Ea 15 Min      44334 (CPT®) Hc Pt Self Care/Mgmt/Train Ea 15 Min      26692 (CPT®) Hc Pt Prosthetic (S) Train Initial Encounter, Each 15 Min      65654 (CPT®) Hc Orthotic(S) Mgmt/Train Initial Encounter, Each 15min      58626 (CPT®) Hc Pt Aquatic Therapy Ea 15 Min      51242 (CPT®) Hc Pt Orthotic(S)/Prosthetic(S) Encounter, Each 15 Min       (CPT®) Hc Pt Electrical Stim Unattended      Total  58 4        Therapy Charges for Today     Code Description Service Date Service Provider Modifiers Qty    29311550121 HC PT MANUAL THERAPY EA 15 MIN 1/4/2019 Gogo Mei, PT GP 4                 OP PT Discharge Summary  Date of Discharge: 01/04/19  Reason for Discharge: Maximum functional potential achieved  Outcomes Achieved: Patient able to partially acheive established goals  Discharge Destination: Home with home program  Discharge Instructions/Additional Comments: COnt wearing compression stockings during day, skin care, elevation, ex.      Gogo Mei, PT  1/4/2019

## 2019-01-05 LAB — INR PPP: 2.1 (ref 2–3)

## 2019-01-06 LAB — INR PPP: 2.1 (ref 2–3)

## 2019-01-07 ENCOUNTER — APPOINTMENT (OUTPATIENT)
Dept: PHYSICAL THERAPY | Facility: HOSPITAL | Age: 69
End: 2019-01-07

## 2019-01-07 ENCOUNTER — ANTICOAGULATION VISIT (OUTPATIENT)
Dept: INTERNAL MEDICINE | Age: 69
End: 2019-01-07

## 2019-01-08 ENCOUNTER — OFFICE VISIT (OUTPATIENT)
Dept: ORTHOPEDIC SURGERY | Facility: CLINIC | Age: 69
End: 2019-01-08

## 2019-01-08 ENCOUNTER — APPOINTMENT (OUTPATIENT)
Dept: PHYSICAL THERAPY | Facility: HOSPITAL | Age: 69
End: 2019-01-08

## 2019-01-08 VITALS — TEMPERATURE: 98.6 F | HEIGHT: 77 IN | WEIGHT: 225 LBS | BODY MASS INDEX: 26.57 KG/M2

## 2019-01-08 DIAGNOSIS — Z96.651 HISTORY OF TOTAL KNEE ARTHROPLASTY, RIGHT: Primary | ICD-10-CM

## 2019-01-08 DIAGNOSIS — G89.29 CHRONIC PAIN OF BOTH KNEES: ICD-10-CM

## 2019-01-08 DIAGNOSIS — M25.561 CHRONIC PAIN OF BOTH KNEES: ICD-10-CM

## 2019-01-08 DIAGNOSIS — M25.562 CHRONIC PAIN OF BOTH KNEES: ICD-10-CM

## 2019-01-08 PROCEDURE — 20610 DRAIN/INJ JOINT/BURSA W/O US: CPT | Performed by: NURSE PRACTITIONER

## 2019-01-08 PROCEDURE — 73562 X-RAY EXAM OF KNEE 3: CPT | Performed by: NURSE PRACTITIONER

## 2019-01-08 PROCEDURE — 99213 OFFICE O/P EST LOW 20 MIN: CPT | Performed by: NURSE PRACTITIONER

## 2019-01-08 RX ORDER — METHYLPREDNISOLONE ACETATE 80 MG/ML
80 INJECTION, SUSPENSION INTRA-ARTICULAR; INTRALESIONAL; INTRAMUSCULAR; SOFT TISSUE
Status: COMPLETED | OUTPATIENT
Start: 2019-01-08 | End: 2019-01-08

## 2019-01-08 RX ORDER — LIDOCAINE HYDROCHLORIDE 20 MG/ML
2 INJECTION, SOLUTION EPIDURAL; INFILTRATION; INTRACAUDAL; PERINEURAL
Status: COMPLETED | OUTPATIENT
Start: 2019-01-08 | End: 2019-01-08

## 2019-01-08 RX ADMIN — LIDOCAINE HYDROCHLORIDE 2 ML: 20 INJECTION, SOLUTION EPIDURAL; INFILTRATION; INTRACAUDAL; PERINEURAL at 14:11

## 2019-01-08 RX ADMIN — METHYLPREDNISOLONE ACETATE 80 MG: 80 INJECTION, SUSPENSION INTRA-ARTICULAR; INTRALESIONAL; INTRAMUSCULAR; SOFT TISSUE at 14:11

## 2019-01-08 NOTE — PROGRESS NOTES
"Patient: Jose Hurtado  YOB: 1950 68 y.o. male  Medical Record Number: [id number]    Chief Complaints:   Chief Complaint   Patient presents with   • Left Knee - Pain, Follow-up   • Right Knee - Follow-up, Pain       History of Present Illness:Jose Hurtado is a 68 y.o. male who presents with complaints of increased left knee pain.  He is about a year out from right total knee replacement he reports that knee is doing great.  Started with increased left knee pain about 6 months ago.  He describes the knee pain as a moderate to severe intermittent ache worse with standing walking, better with anti-inflammatories ice and rest.    Allergies: No Known Allergies    Medications:   Current Outpatient Medications   Medication Sig Dispense Refill   • PARoxetine (PAXIL) 30 MG tablet Take 1 tablet by mouth Every Night. 90 tablet 1   • pramipexole (MIRAPEX) 1.5 MG tablet Take 2 tablets by mouth Every Night. 06853 tablet 1   • warfarin (COUMADIN) 5 MG tablet TAKE ONE TABLET BY MOUTH DAILY - STOP 6 DAYS BEFORE SURGERY AND BRIDGE WITH LOVENOX 60 tablet 2     No current facility-administered medications for this visit.          The following portions of the patient's history were reviewed and updated as appropriate: allergies, current medications, past family history, past medical history, past social history, past surgical history and problem list.    Review of Systems:   A 14 point review of systems was performed. All systems negative except pertinent positives/negative listed in HPI above    Physical Exam:   Vitals:    01/08/19 1321   Temp: 98.6 °F (37 °C)   TempSrc: Temporal   Weight: 102 kg (225 lb)   Height: 195.6 cm (77\")       General: A and O x 3, ASA, NAD    SCLERA:    Normal    DENTITION:   Normal  Skin clear no unusual lesions noted  Left knee patient has trace amount of effusion noted with 110° flexion neutral in extension with a positive Jackie negative Lockman calf is soft and nontender    Radiology: "  Xrays 3 views of bilateral knees were ordered and reviewed today secondary to previous surgery and also increased left knee pain.  X-rays of the right knee show well-placed well-positioned right total knee replacement.  X-rays of left knee show significant arthritic changes noted as well as previous patellectomy.  Comparison films are unchanged     Assessment/Plan:  Status post right TKA stable  Left knee osteoarthritis    Patient I discussed treatment options.  He would like to proceed with left knee cortisone injection.  Prior to injection risks were discussed clean pain, infection, elevate blood sugar.  Patient verbalized understanding would like to proceed with injection.  We refer him to outpatient physical therapy and we will see him back as needed    Large Joint Arthrocentesis: L knee  Date/Time: 1/8/2019 2:11 PM  Consent given by: patient  Site marked: site marked  Timeout: Immediately prior to procedure a time out was called to verify the correct patient, procedure, equipment, support staff and site/side marked as required   Supporting Documentation  Indications: pain and joint swelling   Procedure Details  Location: knee - L knee  Preparation: Patient was prepped and draped in the usual sterile fashion  Needle gauge: 21 g.  Approach: anterolateral  Medications administered: 80 mg methylPREDNISolone acetate 80 MG/ML; 2 mL lidocaine PF 2% 2 %  Patient tolerance: patient tolerated the procedure well with no immediate complications

## 2019-01-09 ENCOUNTER — APPOINTMENT (OUTPATIENT)
Dept: PHYSICAL THERAPY | Facility: HOSPITAL | Age: 69
End: 2019-01-09

## 2019-01-10 ENCOUNTER — APPOINTMENT (OUTPATIENT)
Dept: PHYSICAL THERAPY | Facility: HOSPITAL | Age: 69
End: 2019-01-10

## 2019-01-11 ENCOUNTER — OFFICE VISIT (OUTPATIENT)
Dept: INTERNAL MEDICINE | Age: 69
End: 2019-01-11

## 2019-01-11 ENCOUNTER — APPOINTMENT (OUTPATIENT)
Dept: PHYSICAL THERAPY | Facility: HOSPITAL | Age: 69
End: 2019-01-11

## 2019-01-11 ENCOUNTER — HOSPITAL ENCOUNTER (OUTPATIENT)
Dept: GENERAL RADIOLOGY | Facility: HOSPITAL | Age: 69
Discharge: HOME | End: 2019-01-11
Admitting: INTERNAL MEDICINE

## 2019-01-11 VITALS
DIASTOLIC BLOOD PRESSURE: 70 MMHG | WEIGHT: 226.5 LBS | HEIGHT: 77 IN | TEMPERATURE: 97.5 F | OXYGEN SATURATION: 95 % | SYSTOLIC BLOOD PRESSURE: 130 MMHG | BODY MASS INDEX: 26.74 KG/M2 | RESPIRATION RATE: 13 BRPM | HEART RATE: 81 BPM

## 2019-01-11 DIAGNOSIS — J40 BRONCHITIS: Primary | ICD-10-CM

## 2019-01-11 DIAGNOSIS — I10 ESSENTIAL HYPERTENSION: ICD-10-CM

## 2019-01-11 PROCEDURE — 71046 X-RAY EXAM CHEST 2 VIEWS: CPT

## 2019-01-11 PROCEDURE — 99214 OFFICE O/P EST MOD 30 MIN: CPT | Performed by: INTERNAL MEDICINE

## 2019-01-11 RX ORDER — AMOXICILLIN AND CLAVULANATE POTASSIUM 875; 125 MG/1; MG/1
1 TABLET, FILM COATED ORAL 2 TIMES DAILY
Qty: 20 TABLET | Refills: 0 | Status: SHIPPED | OUTPATIENT
Start: 2019-01-11 | End: 2019-02-13

## 2019-01-11 NOTE — PROGRESS NOTES
Jose Hurtado is a 68 y.o. male who presents with   Chief Complaint   Patient presents with   • Bronchitis     2 weeks-cough, wheezing, phlegm production.  No fever or chills.  Oxygen saturation on room air 95% on today's visit.  Prior past history of esophageal cancer.  X-ray of the chest in October-no acute changes.   • Hypertension     Check blood pressure.   .    68-year-old male- patient of Dr. Mooney-presents with history as outlined above.  He does not have any respiratory distress and is fully awake and alert on today's visit.      Bronchitis   This is a new problem. The current episode started 1 to 4 weeks ago. The problem has been waxing and waning. Associated symptoms include congestion and coughing. Pertinent negatives include no fever. He has tried nothing for the symptoms.   Hypertension   This is a chronic problem. The current episode started more than 1 year ago. The problem is controlled. Current antihypertension treatment includes nothing.        The following portions of the patient's history were reviewed and updated as appropriate: allergies, current medications, past medical history and problem list.    Review of Systems   Constitutional: Negative.  Negative for fever.   HENT: Positive for congestion.    Eyes: Negative.    Respiratory: Positive for cough and wheezing.    Cardiovascular: Negative.    Genitourinary: Negative.    Musculoskeletal: Negative.    Skin: Negative.    Neurological: Negative.    Psychiatric/Behavioral: Negative.        Objective   Physical Exam   Constitutional: He is oriented to person, place, and time. He appears well-developed and well-nourished. No distress.   HENT:   Head: Normocephalic and atraumatic.   Eyes: Conjunctivae and EOM are normal. Pupils are equal, round, and reactive to light.   Neck: Normal range of motion. Neck supple. No thyromegaly present.   Neck exam negative.  Carotid auscultation normal-no bruits heard.   Cardiovascular: Normal rate, regular  rhythm, normal heart sounds and intact distal pulses. Exam reveals no gallop and no friction rub.   No murmur heard.  Pulmonary/Chest: Effort normal. No respiratory distress. He has no wheezes. He has no rales. He exhibits no tenderness.   Patient with a right pleural friction rub.  Also a previous surgical scar of the right lateral thorax which he says is from esophagectomy for esophageal cancer in the past.   Neurological: He is alert and oriented to person, place, and time.   Psychiatric: He has a normal mood and affect. His behavior is normal. Judgment and thought content normal.   Nursing note and vitals reviewed.      Assessment/Plan   Jose was seen today for bronchitis and hypertension.    Diagnoses and all orders for this visit:    Bronchitis  -     amoxicillin-clavulanate (AUGMENTIN) 875-125 MG per tablet; Take 1 tablet by mouth 2 (Two) Times a Day.  -     XR Chest PA & Lateral    Essential hypertension        Plan: Treatment as above.  Chest x-ray today.  Currently on no antihypertensive.  Continue all other medications as prescribed.  Follow-up for today's issue will be dictated by chest x-ray findings and response to current treatment

## 2019-01-14 ENCOUNTER — APPOINTMENT (OUTPATIENT)
Dept: PHYSICAL THERAPY | Facility: HOSPITAL | Age: 69
End: 2019-01-14

## 2019-01-15 ENCOUNTER — APPOINTMENT (OUTPATIENT)
Dept: PHYSICAL THERAPY | Facility: HOSPITAL | Age: 69
End: 2019-01-15

## 2019-01-16 ENCOUNTER — APPOINTMENT (OUTPATIENT)
Dept: PHYSICAL THERAPY | Facility: HOSPITAL | Age: 69
End: 2019-01-16

## 2019-01-17 ENCOUNTER — TREATMENT (OUTPATIENT)
Dept: PHYSICAL THERAPY | Facility: CLINIC | Age: 69
End: 2019-01-17

## 2019-01-17 ENCOUNTER — APPOINTMENT (OUTPATIENT)
Dept: PHYSICAL THERAPY | Facility: HOSPITAL | Age: 69
End: 2019-01-17

## 2019-01-17 DIAGNOSIS — M25.562 CHRONIC PAIN OF LEFT KNEE: Primary | ICD-10-CM

## 2019-01-17 DIAGNOSIS — R26.9 GAIT DISTURBANCE: ICD-10-CM

## 2019-01-17 DIAGNOSIS — G89.29 CHRONIC PAIN OF LEFT KNEE: Primary | ICD-10-CM

## 2019-01-17 PROCEDURE — 97140 MANUAL THERAPY 1/> REGIONS: CPT | Performed by: PHYSICAL THERAPIST

## 2019-01-17 PROCEDURE — 97161 PT EVAL LOW COMPLEX 20 MIN: CPT | Performed by: PHYSICAL THERAPIST

## 2019-01-17 PROCEDURE — 97110 THERAPEUTIC EXERCISES: CPT | Performed by: PHYSICAL THERAPIST

## 2019-01-17 NOTE — PROGRESS NOTES
Physical Therapy Initial Evaluation and Plan of Care      Patient: Jose Hurtado   : 1950  Diagnosis/ICD-10 Code:  Chronic pain of left knee [M25.562, G89.29]  Referring practitioner: FAYE Stern    Subjective Evaluation    History of Present Illness  Mechanism of injury: Chronic L knee pain. Worse recently. Injection 19.   PMH : R TKA about a year ago.   patellectomy from dislocation  Feels weak with walking. Feels like it will buckle      Patient Occupation: retired  Pain  Location: medial knee  Quality: dull ache (weak)  Aggravating factors: ambulation  Progression: improved    Social Support  Lives in: one-story house  Lives with: spouse    Diagnostic Tests  X-ray: abnormal    Treatments  Previous treatment: injection treatment  Patient Goals  Patient goals for therapy: decreased pain, increased strength and independence with ADLs/IADLs             Objective       Active Range of Motion   Left Knee   Flexion: 122 degrees   Extension: WFL    Right Knee   Flexion: 120 degrees   Extension: WFL    Strength/Myotome Testing     Left Hip   Planes of Motion   Flexion: 4  Extension: 3  Abduction: 3+  External rotation: 4  Internal rotation: 4+    Right Hip   Planes of Motion   Flexion: 5  Extension: 3  Abduction: 4+    Swelling     Left Knee Girth Measurement (cm)   Joint line: 45 cm  10 cm above joint line: 46 cm    Right Knee Girth Measurement (cm)   Joint line: 47 cm  10 cm above joint line: 48 cm         Assessment & Plan     Assessment  Impairments: abnormal gait, activity intolerance, impaired balance, impaired physical strength and lacks appropriate home exercise program  Assessment details: Pt is a good candidate for skilled PT intervention to restore functional AROM and strength to return to previous level of ADL's.  Prognosis: good  Prognosis details: Short Term Goals (3 weeks)  1. Pt to exhibit 5-125 degrees of knee AROM to allow for improved gait and stairclimbing  2. Pt to  report less frequent buckling  3. Pt to exhibit 4/5 L hip abduction strength to allow for improved stability with gait and less valgus at knee    Long Term Goals: (6 weeks)  1. Pt to ambulate community distances without assistive device and without buckling  2. Pt to report min to no pain with stairclimbing  3. Pt to score > 45/80 on LEFS  4. Pt to exhibit 5/5 hip abduction strength to allow for more strenuous ADL's and recreational activities    Functional Limitations: walking and standing  Plan  Therapy options: will be seen for skilled physical therapy services  Planned modality interventions: cryotherapy  Planned therapy interventions: manual therapy, balance/weight-bearing training, flexibility, gait training, home exercise program, joint mobilization, neuromuscular re-education, soft tissue mobilization, strengthening, stretching and therapeutic activities  Frequency: 1x week  Duration in weeks: 6  Treatment plan discussed with: patient        Manual Therapy:   5      mins  21460;  Therapeutic Exercise:    20    mins  55000;     Neuromuscular Wil:        mins  88668;    Therapeutic Activity:          mins  95487;     Gait Training:           mins  88952;     Ultrasound:          mins  08106;    Electrical Stimulation:         mins  96983 ( );  Dry Needling          mins self-pay    Timed Treatment:   25   mins   Total Treatment:     55   mins    PT SIGNATURE: Caroline Vasquez PT   [place] License # [id number]  DATE TREATMENT INITIATED: 1/18/2019    Medicare Initial Certification  Certification Period: 4/18/2019  I certify that the therapy services are furnished while this patient is under my care.  The services outlined above are required by this patient, and will be reviewed every 90 days.     PHYSICIAN: Luba Osuna, FAYE      DATE:     Please sign and return via fax to [phone].. Thank you, [facility].

## 2019-01-18 ENCOUNTER — APPOINTMENT (OUTPATIENT)
Dept: PHYSICAL THERAPY | Facility: HOSPITAL | Age: 69
End: 2019-01-18

## 2019-01-18 ENCOUNTER — OFFICE VISIT (OUTPATIENT)
Dept: SLEEP MEDICINE | Facility: HOSPITAL | Age: 69
End: 2019-01-18
Attending: INTERNAL MEDICINE

## 2019-01-18 VITALS
BODY MASS INDEX: 26.57 KG/M2 | OXYGEN SATURATION: 97 % | HEART RATE: 88 BPM | WEIGHT: 225 LBS | HEIGHT: 77 IN | SYSTOLIC BLOOD PRESSURE: 138 MMHG | DIASTOLIC BLOOD PRESSURE: 65 MMHG

## 2019-01-18 DIAGNOSIS — G25.81 RESTLESS LEGS SYNDROME (RLS): ICD-10-CM

## 2019-01-18 DIAGNOSIS — G47.33 OBSTRUCTIVE SLEEP APNEA: Primary | ICD-10-CM

## 2019-01-18 PROCEDURE — G0463 HOSPITAL OUTPT CLINIC VISIT: HCPCS

## 2019-01-18 NOTE — PATIENT INSTRUCTIONS
Pt was educated on the importance of their HEP and their current need for skilled physical therapy. Patients goals and potential limitations were discussed and pt is in agreement with current plan of care and treatment emphasis.

## 2019-01-18 NOTE — PROGRESS NOTES
[facility]  [address] [facility]  3rd Floor  [facility] [address]  [phone]    PCP: George Mooney MD    Reason for visit:  Sleep disorders: JAI    Jose is a 68 y.o.male who was seen in the [facility] today. He is here to review after PSG. Has ffm and straps don't feel right. He sleeps from 10p to 6a. He wakes up feeling tired. He can't tell if the machine is helping, so far poor compliance. His mouth is moderately dry. He is on 1.5 mg of pramipexole bid for RLS. He has chronic anemia, he is on coumadin and hx esophagectomy.  [place] Sleepiness Scale is 19. Caffeine 2-3 per day. Alcohol 3-4 per week.    Jose  reports that he quit smoking about 45 years ago. His smoking use included cigarettes. He has a 4.00 pack-year smoking history. he has never used smokeless tobacco.    Pertinent Positive Review of Systems of denies  Rest of Review of Systems was negative as recorded in Sleep Questionnaire.    Patient  has a past medical history of Anemia, Anti-phospholipid syndrome (CMS/HCC), Arthritis, Bladder disorder, Community acquired pneumonia, Deep venous thrombosis (CMS/HCC) (2006, 2008), Depression, Esophageal carcinoma (CMS/HCC) (12/31/2014), Fatigue, Hammer toe of left foot, Hemorrhoids, HH (hiatus hernia), History of atrial fibrillation (2015), History of kidney stones, History of nephrolithiasis, History of pancreatitis, History of radiation therapy, Hypertension, Long-term (current) use of anticoagulants, INR goal 2.0-3.0, Malignant neoplasm of prostate (CMS/HCC), Restless legs syndrome, Squamous carcinoma, Stasis dermatitis, and Warfarin anticoagulation.     Current Medications:    Current Outpatient Medications:   •  amoxicillin-clavulanate (AUGMENTIN) 875-125 MG per tablet, Take 1 tablet by mouth 2 (Two) Times a Day., Disp: 20 tablet, Rfl: 0  •  PARoxetine (PAXIL) 30 MG tablet, Take 1 tablet by mouth Every Night., Disp: 90 tablet, Rfl: 1  •  pramipexole (MIRAPEX) 1.5  "MG tablet, Take 2 tablets by mouth Every Night., Disp: 71537 tablet, Rfl: 1  •  warfarin (COUMADIN) 5 MG tablet, TAKE ONE TABLET BY MOUTH DAILY - STOP 6 DAYS BEFORE SURGERY AND BRIDGE WITH LOVENOX, Disp: 60 tablet, Rfl: 2   also entered in Sleep Questionnaire         Vital Signs: /65 (BP Location: Left arm, Patient Position: Sitting)   Pulse 88   Ht 195.6 cm (77\")   Wt 102 kg (225 lb)   SpO2 97%   BMI 26.68 kg/m²     Body mass index is 26.68 kg/m².       Tongue: normal      Dentition: good       Pharynx: Posterior pharyngeal pillars are narrow   Mallampatti: IV (only hard palate visible)        General: Alert. Cooperative. Well developed. No acute distress.             Head:  Normocephalic. Symmetrical. Atraumatic.              Nose: No septal deviation. No drainage.          Throat: No oral lesions. No thrush. Moist mucous membranes.    Chest Wall:  Normal shape. Symmetric expansion with respiration. No tenderness.             Neck:  Trachea midline.           Lungs:  Clear to auscultation bilaterally. No wheezes. No rhonchi. No rales. Respirations regular, even and unlabored.            Heart:  Regular rhythm and normal rate. Normal S1 and S2. No murmur.     Abdomen:  Soft, non-tender and non-distended. Normal bowel sounds. No masses.  Extremities:  Moves all extremities well. 2+ edema with compression stockings.   Psychiatric: Normal mood and affect.    Study:  · 11/8/18  Overnight split polysomnogram study.  Diagnostic study from 10 PM to 11:35 PM.  Sleep efficiency 74.6% with 1.18 hours total sleep time.  Sleep distribution showed absence of slow-wave sleep and REM sleep.  AHI index is 38.9 with RDI of 51.6.  This indicates severe obstructive sleep apnea.  Patient slept supine for the entire portion of the diagnostic study.  Due to absence of REM sleep severity may be underestimated.  Oxygen saturation remained above 88%.  Arousal index is 53 events an hour.  Patient had 6.56% time " snoring.  Titration study from 11:35 PM to 5:15 AM.  Sleep efficiency was poor at 46.9% with 2.66 hours total sleep time.  Some slow-wave sleep and REM sleep was seen during the titration study.  AHI index improved with application of positive airway pressure device.  Oxygen saturation remained above 88%.  CPAP increased from 5-14 cm water pressure.  Maximum sleep at 6 cm water pressure.  However this is not adequate to completely correct the obstructive sleep apnea.  CPAP was titrated to a maximum of 14 cm water     Testing:  · Compliance since that up is good.  However patient has only been able to use the device for 2 hours per night.  AHI is 4.4 with average CPAP pressure 6-7 cm.    [facility] [employer]: [employer]    Impression:  1. Obstructive sleep apnea    2. Restless legs syndrome (RLS)        Plan:  Jose tends to take the mask off. Try auto 10-15 to see if it allows him to keep the mask on.  He was fitted for a new mask by the technician today.  I impressed upon him the severity of his sleep apnea necessity of using the device nightly.      He has restless leg syndrome and remains on medications.  He has a history of esophagectomy and chronic anemia.  I advised him to recheck his hemoglobin level as well as iron levels.  He may require replacement which could help his restless leg syndrome.  He will continue current dose of pramipexole which is 1.5 mg twice a day, through his PCP.    He has a history of DVT and therefore has chronic leg edema.    I reiterated the importance of effective treatment of obstructive sleep apnea with PAP machine.  Cardiovascular health risks of untreated sleep apnea were again reviewed.  Patient was asked to remain cautious if there is persistent hypersomnolence. The benefit of weight loss in reducing severity of obstructive sleep apnea was discussed.  Patient would benefit from adhering to a strict diet to achieve ideal BMI.     Change of PAP supplies regularly is important for effective  use.  Change of cushion on the mask or plugs on nasal pillows along with disposable filters once every month and change of mask frame, tubing, headgear and Velcro straps every 6 months at the minimum was reiterated.    This patient is compliant with PAP machine and benefits from its use.  Apnea hypopneas index is corrected/improved.  Daytime hypersomnolence has resolved.     Patient will follow up in this clinic in 1 months APRN    Thank you for allowing me to participate in your patient's care.    Stephon Sommers MD    Part of this note may be an electronic transcription/translation of spoken language to printed text using the Dragon Dictation System.

## 2019-01-29 ENCOUNTER — TREATMENT (OUTPATIENT)
Dept: PHYSICAL THERAPY | Facility: CLINIC | Age: 69
End: 2019-01-29

## 2019-01-29 DIAGNOSIS — G89.29 CHRONIC PAIN OF LEFT KNEE: Primary | ICD-10-CM

## 2019-01-29 DIAGNOSIS — M25.562 CHRONIC PAIN OF LEFT KNEE: Primary | ICD-10-CM

## 2019-01-29 PROCEDURE — 97110 THERAPEUTIC EXERCISES: CPT | Performed by: PHYSICAL THERAPIST

## 2019-01-29 PROCEDURE — 97112 NEUROMUSCULAR REEDUCATION: CPT | Performed by: PHYSICAL THERAPIST

## 2019-01-29 NOTE — PROGRESS NOTES
Physical Therapy Daily Progress Note        Subjective     Jose Hurtado reports: Elliptical is really helping. I usually do 10-15 minutes a day.     Objective   See Exercise, Manual, and Modality Logs for complete treatment.       Assessment/Plan   Improving step up ability but still needs verbal and tactile cues to not allow valgus. Needed cueing for S/L abduction in neutral. He  Kept allowing hip to go into flexion    Progress per Plan of Care           Manual Therapy:   6      mins  02986;  Therapeutic Exercise:  20     mins  83943;     Neuromuscular Wil:6       mins  13991;    Therapeutic Activity:         mins  95470;     Gait Training:         mins  67695;     Ultrasound:         mins  78451;    Electrical Stimulation:        mins  64424 ( );  Dry Needling         mins self-pay    Timed Treatment:   32   mins   Total Treatment:     35   mins    Caroline Vasquez PT  Physical Therapist  [place] License # [id number]

## 2019-02-02 LAB — INR PPP: 3 (ref 2–3)

## 2019-02-04 ENCOUNTER — TREATMENT (OUTPATIENT)
Dept: PHYSICAL THERAPY | Facility: CLINIC | Age: 69
End: 2019-02-04

## 2019-02-04 DIAGNOSIS — G89.29 CHRONIC PAIN OF LEFT KNEE: Primary | ICD-10-CM

## 2019-02-04 DIAGNOSIS — R26.9 GAIT DISTURBANCE: ICD-10-CM

## 2019-02-04 DIAGNOSIS — M25.562 CHRONIC PAIN OF LEFT KNEE: Primary | ICD-10-CM

## 2019-02-04 PROCEDURE — 97140 MANUAL THERAPY 1/> REGIONS: CPT | Performed by: PHYSICAL THERAPIST

## 2019-02-04 PROCEDURE — 97110 THERAPEUTIC EXERCISES: CPT | Performed by: PHYSICAL THERAPIST

## 2019-02-04 PROCEDURE — 97112 NEUROMUSCULAR REEDUCATION: CPT | Performed by: PHYSICAL THERAPIST

## 2019-02-04 NOTE — PROGRESS NOTES
Physical Therapy Daily Progress Note        Subjective     Jose Hurtado reports: I did hip abduction with 6# cuff weights. Inner thigh muscles very sore today    Objective   See Exercise, Manual, and Modality Logs for complete treatment.       Assessment/Plan  Pt fatigued after visit. Added 17# kettle bell to sit to stands and added sliding lunge . ALso did 20 minutes on elliptical. Felt better in adductors after heat and [place]    Progress per Plan of Care           Manual Therapy:  10       mins  05966;  Therapeutic Exercise: 30      mins  32871;     Neuromuscular Wil:6       mins  45937;    Therapeutic Activity:         mins  45923;     Gait Training:         mins  05904;     Ultrasound:         mins  73477;    Electrical Stimulation:        mins  91755 ( );  Dry Needling         mins self-pay    Timed Treatment:   46   mins   Total Treatment:     56   mins    Caroline Vasquez, PT  Physical Therapist  [place] License # [id number]

## 2019-02-05 ENCOUNTER — ANTICOAGULATION VISIT (OUTPATIENT)
Dept: INTERNAL MEDICINE | Age: 69
End: 2019-02-05

## 2019-02-05 NOTE — PROGRESS NOTES
Call patient with INR result:    INR is within therapeutic range but on the high end. Change coumadin schedule to 5 mg everyday, except 2.5 mg on Tuesday, Thursday, and Saturday. Recheck INR in 3 weeks.

## 2019-02-09 LAB — INR PPP: 2.5 (ref 2–3)

## 2019-02-11 ENCOUNTER — ANTICOAGULATION VISIT (OUTPATIENT)
Dept: INTERNAL MEDICINE | Age: 69
End: 2019-02-11

## 2019-02-13 ENCOUNTER — OFFICE VISIT (OUTPATIENT)
Dept: INTERNAL MEDICINE | Age: 69
End: 2019-02-13

## 2019-02-13 VITALS
OXYGEN SATURATION: 98 % | DIASTOLIC BLOOD PRESSURE: 62 MMHG | SYSTOLIC BLOOD PRESSURE: 136 MMHG | TEMPERATURE: 98.1 F | HEART RATE: 74 BPM | WEIGHT: 235 LBS | BODY MASS INDEX: 27.75 KG/M2 | HEIGHT: 77 IN

## 2019-02-13 DIAGNOSIS — C61 MALIGNANT NEOPLASM OF PROSTATE (HCC): ICD-10-CM

## 2019-02-13 DIAGNOSIS — C15.9 ADENOCARCINOMA OF ESOPHAGUS (HCC): ICD-10-CM

## 2019-02-13 DIAGNOSIS — D12.6 ADENOMATOUS POLYP OF COLON, UNSPECIFIED PART OF COLON: ICD-10-CM

## 2019-02-13 DIAGNOSIS — F32.9 REACTIVE DEPRESSION: ICD-10-CM

## 2019-02-13 DIAGNOSIS — D68.61 ANTI-PHOSPHOLIPID SYNDROME (HCC): Primary | Chronic | ICD-10-CM

## 2019-02-13 DIAGNOSIS — F32.A DEPRESSION, UNSPECIFIED DEPRESSION TYPE: ICD-10-CM

## 2019-02-13 DIAGNOSIS — G25.81 RLS (RESTLESS LEGS SYNDROME): Chronic | ICD-10-CM

## 2019-02-13 DIAGNOSIS — I10 ESSENTIAL HYPERTENSION: Chronic | ICD-10-CM

## 2019-02-13 PROBLEM — I82.409 DVT (DEEP VENOUS THROMBOSIS) (HCC): Status: RESOLVED | Noted: 2017-08-24 | Resolved: 2019-02-13

## 2019-02-13 PROBLEM — E78.49 OTHER HYPERLIPIDEMIA: Status: RESOLVED | Noted: 2018-01-30 | Resolved: 2019-02-13

## 2019-02-13 PROCEDURE — 99215 OFFICE O/P EST HI 40 MIN: CPT | Performed by: INTERNAL MEDICINE

## 2019-02-13 RX ORDER — PAROXETINE 30 MG/1
30 TABLET, FILM COATED ORAL NIGHTLY
Qty: 90 TABLET | Refills: 1 | Status: SHIPPED | OUTPATIENT
Start: 2019-02-13 | End: 2019-04-15

## 2019-02-13 NOTE — ASSESSMENT & PLAN NOTE
Lab Results   Component Value Date    INR 2.50 02/09/2019    INR 3.00 02/02/2019    INR 2.10 01/05/2019    INR 2.10 01/05/2019    PROTIME 18.2 (H) 03/29/2018    PROTIME 16.2 (H) 03/27/2018    PROTIME 15.8 (H) 03/26/2018      On 2/5/2019 : INR was on the higher end of range.  Changed coumadin schedule to 5 mg everyday, except 2.5 mg on Tuesday, Thursday, and Saturday.     INR on 2/9/19 was 2.5. Continue current dose. Continue home INR monitoring weekly.

## 2019-02-13 NOTE — PROGRESS NOTES
[facility] [place] INTERNAL MEDICINE  RICHARDSON HEALY M.D.      Jose Hurtado / 69 y.o. / male  02/13/2019      ASSESSMENT & PLAN:    Problem List Items Addressed This Visit        High    Anti-phospholipid syndrome (CMS/HCC) - Primary (Chronic)    Overview     DVT LLE in 2003 and 2006  Antiphospholipid syndrome  On life-long AC on warfarin.   *INR monitored at home weekly.          Current Assessment & Plan     Lab Results   Component Value Date    INR 2.50 02/09/2019    INR 3.00 02/02/2019    INR 2.10 01/05/2019    INR 2.10 01/05/2019    PROTIME 18.2 (H) 03/29/2018    PROTIME 16.2 (H) 03/27/2018    PROTIME 15.8 (H) 03/26/2018      On 2/5/2019 : INR was on the higher end of range.  Changed coumadin schedule to 5 mg everyday, except 2.5 mg on Tuesday, Thursday, and Saturday.     INR on 2/9/19 was 2.5. Continue current dose. Continue home INR monitoring weekly.          Relevant Medications    warfarin (COUMADIN) 5 MG tablet    Other Relevant Orders    CBC & Differential       Medium    RLS (restless legs syndrome) (Chronic)    Overview     Stable. Continue pramipexole 1.5 mg two nightly.          Depression (Chronic)    Overview     Stable on paroxetine 30 mg qd.          Current Assessment & Plan     Refill sent to pharmacy.          Relevant Medications    PARoxetine (PAXIL) 30 MG tablet    Other Relevant Orders    TSH+Free T4    Hypertension (Chronic)    Current Assessment & Plan     Continue lifestyle modifications. No medication currently.             Low    Adenocarcinoma of esophagus (CMS/HCC)    Overview     Dx'ed 2014: s/p surgery (Carlos), XRT, chemotherapy  *CBC (Code)         Adenomatous polyp of colon    Overview     Colonoscopy every 5 years (Yana)         Malignant neoplasm of prostate (CMS/HCC)    Overview     Continue follow-up with urology.              Orders Placed This Encounter   Procedures   • TSH+Free T4   • CBC & Differential     New Medications Ordered This Visit   Medications   • PARoxetine (PAXIL) 30  "MG tablet     Sig: Take 1 tablet by mouth Every Night.     Dispense:  90 tablet     Refill:  1       Summary/Discussion:  Spent 45 minutes in face-to-face encounter time and >50% of time spent in counseling regarding above medical problems/issues.      Return in about 4 months (around 6/13/2019) for Reassess chronic medical problems.    ____________________________________________________________________    VITALS:    Visit Vitals  /62   Pulse 74   Temp 98.1 °F (36.7 °C)   Ht 195.6 cm (77.01\")   Wt 107 kg (235 lb)   SpO2 98%   BMI 27.86 kg/m²       BP Readings from Last 3 Encounters:   02/13/19 136/62   01/18/19 138/65   01/11/19 130/70     Wt Readings from Last 3 Encounters:   02/13/19 107 kg (235 lb)   01/18/19 102 kg (225 lb)   01/11/19 103 kg (226 lb 8 oz)      Body mass index is 27.86 kg/m².    CC: Main reason(s) for today's visit: Establish Care (referred by Dr Mooney pt)      HPI:    Patient is a 69 y.o. male who is here to establish care.     Previous 2 DVT's of LLE, noted to have APS, on life-long AC with warfarin, INR is monitored weekly at home. Denies bleeding recently. Warfarin dose was recently adjusted 2/5/19 for INR of 3.0. 2/9/19 INR was 2.5. Denies chest pains. Wears compression stockings for legs.     Depression: on paroxetine 30 mg and reports to doing fine. Denies problems with medication. Needs refill.     Hypertension is controlled without medication for now.     RLS: on pramipexole 1.5 mg two nightly. Denies compulsive behavior or mood problems.     History of esophageal cancer s/p surgery, xrt, and chemo. Followed by [facility].   History of colon polyps , colonoscopy every 5 years.   Followed by [facility] for prostate cancer history s/p prostatectomy.     Patient Care Team:  Brandin Bolton MD as PCP - General (Internal Medicine)  Tapan, George THORPE II, MD as Consulting Physician (Hematology and Oncology)  Jose Inman MD as Consulting Physician (Urology)  Mulugeta Millan MD as Consulting " Physician (Gastroenterology)  Seth Randhawa MD as Consulting Physician (Ophthalmology)  ____________________________________________________________________      REVIEW OF SYSTEMS    Review of Systems   Constitutional: Negative.  Negative for unexpected weight change.   HENT: Negative.    Eyes: Negative.    Respiratory: Negative.    Cardiovascular: Negative.    Gastrointestinal: Negative.    Endocrine: Negative.    Genitourinary: Negative.         Incontinence    Musculoskeletal: Positive for arthralgias.   Skin: Negative.    Allergic/Immunologic: Negative.    Neurological: Negative.    Hematological: Negative.    Psychiatric/Behavioral: Negative.        PHYSICAL EXAMINATION    Physical Exam   Constitutional: No distress.   HENT:   Right Ear: Tympanic membrane normal.   Left Ear: Tympanic membrane normal.   Mouth/Throat: Oropharynx is clear and moist. He has dentures (top). No oral lesions.   Uses hearing aids   Eyes: Conjunctivae are normal. No scleral icterus.   Neck: Neck supple. No tracheal deviation present. No thyromegaly present.   Cardiovascular: Normal rate, regular rhythm, normal heart sounds and intact distal pulses. Exam reveals no gallop and no friction rub.   No murmur heard.  Pulmonary/Chest: Effort normal and breath sounds normal.   Abdominal: Soft. Bowel sounds are normal. He exhibits no distension and no mass. There is no tenderness. No hernia.   Musculoskeletal: He exhibits edema (2+ BLE; stockings).   Lymphadenopathy:     He has no cervical adenopathy.        Right: No supraclavicular adenopathy present.        Left: No supraclavicular adenopathy present.   Neurological: He is alert. He has normal reflexes.   Skin: Skin is intact. No pallor.   No jaundice   Psychiatric: He has a normal mood and affect. His behavior is normal. Judgment and thought content normal. Cognition and memory are normal.         REVIEWED DATA:    Labs:   Lab Results   Component Value Date     09/04/2018    K 4.2  09/04/2018    AST 23 09/04/2018    ALT 18 09/04/2018    BUN 30 (H) 09/04/2018    CREATININE 1.14 09/04/2018    CREATININE 0.93 05/10/2018    CREATININE 1.16 03/15/2018    EGFRIFNONA 64 09/04/2018    EGFRIFAFRI 97 05/10/2018       Lab Results   Component Value Date    GLUCOSE 66 09/04/2018    GLUCOSE 108 (H) 03/15/2018    GLUCOSE 103 03/08/2018       Lab Results   Component Value Date    LDL 92 05/10/2018     (H) 01/30/2018    LDL 98 01/26/2017    HDL 45 05/10/2018    TRIG 79 05/10/2018       Lab Results   Component Value Date    TSH 3.29 04/28/2015          Lab Results   Component Value Date    WBC 3.14 (L) 09/04/2018    HGB 10.9 (L) 09/04/2018    HGB 9.0 (L) 03/30/2018    HGB 7.6 (L) 03/29/2018     09/04/2018       Lab Results   Component Value Date    INR 2.50 02/09/2019    INR 3.00 02/02/2019    INR 2.10 01/05/2019    INR 2.10 01/05/2019    PROTIME 18.2 (H) 03/29/2018    PROTIME 16.2 (H) 03/27/2018    PROTIME 15.8 (H) 03/26/2018      Imaging:        Medical Tests:        Summary of old records / correspondence / consultant report:        Request outside records:        ______________________________________________________________________    ALLERGIES  No Known Allergies     MEDICATIONS  Current Outpatient Medications on File Prior to Visit   Medication Sig   • pramipexole (MIRAPEX) 1.5 MG tablet Take 2 tablets by mouth Every Night.   • warfarin (COUMADIN) 5 MG tablet      PFSH:     The following portions of the patient's history were reviewed and updated as appropriate: Allergies / Current Medications / Past Medical History / Surgical History / Social History / Family History    PROBLEM LIST   Patient Active Problem List   Diagnosis   • Adenocarcinoma of esophagus (CMS/HCC)   • Adenomatous polyp of colon   • Malignant neoplasm of prostate (CMS/HCC)   • RLS (restless legs syndrome)   • Depression   • Hypertension   • Anti-phospholipid syndrome (CMS/HCC)       PAST MEDICAL HISTORY  Past Medical  History:   Diagnosis Date   • Anemia    • Anti-phospholipid syndrome (CMS/HCC)     On lifelong anticoagulation therapy   • Arthritis     RIGHT KNEE   • Bladder disorder     LEAKAGE   ON MED  wers pads   • Community acquired pneumonia     HISTORY OF IN 2014   • Deep venous thrombosis (CMS/HCC) 2006, 2008    Left lower extremity multiple   • Depression    • Esophageal carcinoma (CMS/HCC) 12/31/2014    had chemo and radiation prior surgery   • Fatigue    • Hammer toe of left foot     had surgery   • Hemorrhoids    • HH (hiatus hernia)    • History of atrial fibrillation 2015    ONE EPISODE WHILE HOSPITALIZED   • History of kidney stones    • History of nephrolithiasis    • History of pancreatitis     PT STATES MANY YEARS AGO   • History of radiation therapy    • Hypertension    • Long-term (current) use of anticoagulants, INR goal 2.0-3.0    • Malignant neoplasm of prostate (CMS/HCC)    • Other hyperlipidemia 1/30/2018 January 30, 2018 lipid panel risk 12.8%   • Restless legs syndrome    • Squamous carcinoma     on the head   • Stasis dermatitis    • Warfarin anticoagulation        SURGICAL HISTORY  Past Surgical History:   Procedure Laterality Date   • APPENDECTOMY  1950   • BRONCHOSCOPY      (Diagnostic)   • CATARACT EXTRACTION Bilateral 2014   • COLONOSCOPY  12/15/2014    Complete / Description: EH, IH, torts, stool, follow-up colonoscopy due in 5 years.   • COLONOSCOPY N/A 6/13/2017    Procedure: COLONOSCOPY TO CECUM AND INTO TERMINAL ILEUM;  Surgeon: Mulugeta BLACKWELL MD;  Location: [facility] ENDOSCOPY;  Service:    • CYSTOSCOPY W/ LASER LITHOTRIPSY     • ENDOSCOPY N/A 6/13/2017    Procedure: ESOPHAGOGASTRODUODENOSCOPY WITH COLD BIOPSY;  Surgeon: Lake Gonzalez MD;  Location: [facility] ENDOSCOPY;  Service:    • ESOPHAGECTOMY      April 2015, stage IIB esophageal carcinoma, sub-total resection.   • ESOPHAGECTOMY      Esophagectomy Subtotal [place] Procedure   • EXCISION LESION  08/2012    Removal of Squamous  Cell CA on Head   • HAMMER TOE REPAIR  2014    Hammertoe Operation (Each Toe), 10/2014   • HAMMER TOE REPAIR Left 10/3/2017    Procedure: Left second third and fourth distal interphalangeal joint resection with flexor tenotomy;  Surgeon: Mulugeta Lira MD;  Location: [facility];  Service:    • HERNIA REPAIR      incisional   • JEJUNOSTOMY      Laparoscopic   • JEJUNOSTOMY      tube removal    • KNEE SURGERY Bilateral , ,    • PATELLA SURGERY Left     removed   • PILONIDAL CYST / SINUS EXCISION     • MO TOTAL KNEE ARTHROPLASTY Right 3/26/2018    Procedure: TOTAL KNEE ARTHROPLASTY;  Surgeon: Renny Solis MD;  Location: [facility] OR;  Service: Orthopedics   • PROSTATECTOMY     • PYLOROPLASTY     • SPINAL FUSION  1998    C 5,6   • TONSILLECTOMY     • TONSILLECTOMY     • UPPER GASTROINTESTINAL ENDOSCOPY  12/15/2014    LA Grade D esophagitis, Pardo's, HH, multiple duodenal ulcers   • VENTRAL/INCISIONAL HERNIA REPAIR N/A 2016    Procedure: VENTRAL/INCISIONAL HERNIA REPAIR, open, with mesh, and component separation;  Surgeon: Darren Rivas MD;  Location: [facility] OR;  Service:        SOCIAL HISTORY  Social History     Socioeconomic History   • Marital status:      Spouse name: Lena   • Number of children: 0   • Years of education: College   • Highest education level: Not on file   Occupational History   • Occupation: Retired      Comment: Retired    Tobacco Use   • Smoking status: Former Smoker     Packs/day: 2.00     Years: 3.00     Pack years: 6.00     Types: Cigarettes     Last attempt to quit: 1974     Years since quittin.1   • Smokeless tobacco: Never Used   Substance and Sexual Activity   • Alcohol use: Yes     Frequency: 2-3 times a week   • Drug use: No   • Sexual activity: Defer   Social History Narrative    self-employed        FAMILY HISTORY  Family History   Problem Relation Age of Onset   • Cerebral  aneurysm Mother         cerebral artery aneurysm ( age 56)   • Prostate cancer Brother 68   • Anxiety disorder Father    • Suicide Attempts Father          of suicide   • Cancer Father         bladder   • No Known Problems Brother    • Colon cancer Neg Hx    • Esophageal cancer Neg Hx    • Dementia Neg Hx          **Dragon Disclaimer:   Much of this encounter note is an electronic transcription/translation of spoken language to printed text. The electronic translation of spoken language may permit erroneous, or at times, nonsensical words or phrases to be inadvertently transcribed. Although I have reviewed the note for such errors, some may still exist.       Template created by Moose Bolton MD

## 2019-02-14 ENCOUNTER — TELEPHONE (OUTPATIENT)
Dept: INTERNAL MEDICINE | Age: 69
End: 2019-02-14

## 2019-02-14 LAB
BASOPHILS # BLD AUTO: 0 X10E3/UL (ref 0–0.2)
BASOPHILS NFR BLD AUTO: 0 %
EOSINOPHIL # BLD AUTO: 0.2 X10E3/UL (ref 0–0.4)
EOSINOPHIL NFR BLD AUTO: 5 %
ERYTHROCYTE [DISTWIDTH] IN BLOOD BY AUTOMATED COUNT: 15.5 % (ref 12.3–15.4)
HCT VFR BLD AUTO: 34.4 % (ref 37.5–51)
HGB BLD-MCNC: 11.2 G/DL (ref 13–17.7)
IMM GRANULOCYTES # BLD AUTO: 0 X10E3/UL (ref 0–0.1)
IMM GRANULOCYTES NFR BLD AUTO: 0 %
LYMPHOCYTES # BLD AUTO: 1 X10E3/UL (ref 0.7–3.1)
LYMPHOCYTES NFR BLD AUTO: 26 %
MCH RBC QN AUTO: 29.2 PG (ref 26.6–33)
MCHC RBC AUTO-ENTMCNC: 32.6 G/DL (ref 31.5–35.7)
MCV RBC AUTO: 90 FL (ref 79–97)
MONOCYTES # BLD AUTO: 0.4 X10E3/UL (ref 0.1–0.9)
MONOCYTES NFR BLD AUTO: 9 %
NEUTROPHILS # BLD AUTO: 2.4 X10E3/UL (ref 1.4–7)
NEUTROPHILS NFR BLD AUTO: 60 %
PLATELET # BLD AUTO: 139 X10E3/UL (ref 150–379)
RBC # BLD AUTO: 3.83 X10E6/UL (ref 4.14–5.8)
T4 FREE SERPL-MCNC: 1.33 NG/DL (ref 0.82–1.77)
TSH SERPL DL<=0.005 MIU/L-ACNC: 3.44 UIU/ML (ref 0.45–4.5)
WBC # BLD AUTO: 4 X10E3/UL (ref 3.4–10.8)

## 2019-02-14 NOTE — PROGRESS NOTES
Send results    Mild anemia and mildly low platelets notes. Will assess further on next visit.     Thyroid is okay.

## 2019-02-14 NOTE — TELEPHONE ENCOUNTER
----- Message from Brandin Bolton MD sent at 2/14/2019  7:06 AM EST -----  Send results    Mild anemia and mildly low platelets notes. Will assess further on next visit.     Thyroid is okay.

## 2019-02-15 ENCOUNTER — TELEPHONE (OUTPATIENT)
Dept: INTERNAL MEDICINE | Age: 69
End: 2019-02-15

## 2019-02-15 ENCOUNTER — OFFICE VISIT (OUTPATIENT)
Dept: SLEEP MEDICINE | Facility: HOSPITAL | Age: 69
End: 2019-02-15
Attending: INTERNAL MEDICINE

## 2019-02-15 ENCOUNTER — OFFICE VISIT (OUTPATIENT)
Dept: GASTROENTEROLOGY | Facility: CLINIC | Age: 69
End: 2019-02-15

## 2019-02-15 VITALS
OXYGEN SATURATION: 96 % | DIASTOLIC BLOOD PRESSURE: 71 MMHG | SYSTOLIC BLOOD PRESSURE: 142 MMHG | WEIGHT: 238 LBS | BODY MASS INDEX: 28.1 KG/M2 | HEART RATE: 80 BPM | HEIGHT: 77 IN

## 2019-02-15 VITALS
DIASTOLIC BLOOD PRESSURE: 72 MMHG | TEMPERATURE: 98 F | BODY MASS INDEX: 27.98 KG/M2 | HEIGHT: 77 IN | SYSTOLIC BLOOD PRESSURE: 134 MMHG | WEIGHT: 237 LBS

## 2019-02-15 DIAGNOSIS — G47.10 PERSISTENT HYPERSOMNOLENCE DISORDER: ICD-10-CM

## 2019-02-15 DIAGNOSIS — C15.9 ADENOCARCINOMA OF ESOPHAGUS (HCC): ICD-10-CM

## 2019-02-15 DIAGNOSIS — G47.33 OBSTRUCTIVE SLEEP APNEA: Primary | ICD-10-CM

## 2019-02-15 DIAGNOSIS — R19.4 CHANGE IN BOWEL HABITS: ICD-10-CM

## 2019-02-15 DIAGNOSIS — D12.6 ADENOMATOUS POLYP OF COLON, UNSPECIFIED PART OF COLON: ICD-10-CM

## 2019-02-15 DIAGNOSIS — E66.3 OVERWEIGHT (BMI 25.0-29.9): ICD-10-CM

## 2019-02-15 DIAGNOSIS — G25.81 RESTLESS LEGS SYNDROME (RLS): ICD-10-CM

## 2019-02-15 DIAGNOSIS — K62.5 RECTAL BLEEDING: Primary | ICD-10-CM

## 2019-02-15 DIAGNOSIS — C61 MALIGNANT NEOPLASM OF PROSTATE (HCC): ICD-10-CM

## 2019-02-15 PROCEDURE — 99214 OFFICE O/P EST MOD 30 MIN: CPT | Performed by: NURSE PRACTITIONER

## 2019-02-15 PROCEDURE — G0463 HOSPITAL OUTPT CLINIC VISIT: HCPCS

## 2019-02-15 NOTE — PROGRESS NOTES
[facility] SLEEP MEDICINE  [address]  [address]  [place] [address]  [phone]    PCP: Brandin Bolton MD    Reason for visit:  Sleep disorders: JAI    Jose is a 69 y.o.male who was seen in the Sleep Disorders Center today. We changed pressure last time and his compliance has improved. Mask feels comfortable now. Sleeps from 10p to 6a. He wakes up somewhat rested, more than before, but still somewhat sleepy. Pressure feels good now. Takes mirapex for RLS, controlled.  [place] Sleepiness Scale is 16. Caffeine 1-2 per day. Alcohol 3-4 per week.    Jose  reports that he quit smoking about 45 years ago. His smoking use included cigarettes. He has a 6.00 pack-year smoking history. he has never used smokeless tobacco.    Pertinent Positive Review of Systems of nasal congestion, pnd, cough  Rest of Review of Systems was negative as recorded in Sleep Questionnaire.    Patient  has a past medical history of Anemia, Anti-phospholipid syndrome (CMS/HCC), Arthritis, Bladder disorder, Community acquired pneumonia, Deep venous thrombosis (CMS/HCC) (2006, 2008), Depression, Esophageal carcinoma (CMS/HCC) (12/31/2014), Fatigue, Hammer toe of left foot, Hemorrhoids, HH (hiatus hernia), History of atrial fibrillation (2015), History of kidney stones, History of nephrolithiasis, History of pancreatitis, History of radiation therapy, Hypertension, Long-term (current) use of anticoagulants, INR goal 2.0-3.0, Malignant neoplasm of prostate (CMS/HCC), Other hyperlipidemia (1/30/2018), Restless legs syndrome, Squamous carcinoma, Stasis dermatitis, and Warfarin anticoagulation.     Current Medications:    Current Outpatient Medications:   •  PARoxetine (PAXIL) 30 MG tablet, Take 1 tablet by mouth Every Night., Disp: 90 tablet, Rfl: 1  •  pramipexole (MIRAPEX) 1.5 MG tablet, Take 2 tablets by mouth Every Night., Disp: 98198 tablet, Rfl: 1  •  warfarin (COUMADIN) 5 MG tablet, TAKE ONE TABLET BY MOUTH DAILY - STOP 6  "DAYS BEFORE SURGERY AND BRIDGE WITH LOVENOX, Disp: 60 tablet, Rfl: 2   also entered in Sleep Questionnaire         Vital Signs: /71   Pulse 80   Ht 195.6 cm (77\")   Wt 108 kg (238 lb)   SpO2 96%   BMI 28.22 kg/m²     Body mass index is 28.22 kg/m².       Tongue: normal      Dentition: good       Pharynx: Posterior pharyngeal pillars are wide   Mallampatti: IV (only hard palate visible)        General: Alert. Cooperative. Well developed. No acute distress.             Head:  Normocephalic. Symmetrical. Atraumatic.              Nose: No septal deviation. No drainage.          Throat: No oral lesions. No thrush. Moist mucous membranes.    Chest Wall:  Normal shape. Symmetric expansion with respiration. No tenderness.             Neck:  Trachea midline.           Lungs:  Clear to auscultation bilaterally. No wheezes. No rhonchi. No rales. Respirations regular, even and unlabored.            Heart:  Regular rhythm and normal rate. Normal S1 and S2. No murmur.     Abdomen:  Soft, non-tender and non-distended. Normal bowel sounds. No masses.  Extremities:  Moves all extremities well. No edema.    Psychiatric: Normal mood and affect.    Study:  · 11/8/18  Overnight split polysomnogram study.  Diagnostic study from 10 PM to 11:35 PM.  Sleep efficiency 74.6% with 1.18 hours total sleep time.  Sleep distribution showed absence of slow-wave sleep and REM sleep.  AHI index is 38.9 with RDI of 51.6.  This indicates severe obstructive sleep apnea.  Patient slept supine for the entire portion of the diagnostic study.  Due to absence of REM sleep severity may be underestimated.  Oxygen saturation remained above 88%.  Arousal index is 53 events an hour.  Patient had 6.56% time snoring.  Titration study from 11:35 PM to 5:15 AM.  Sleep efficiency was poor at 46.9% with 2.66 hours total sleep time.  Some slow-wave sleep and REM sleep was seen during the titration study.  AHI index improved with application of positive airway " pressure device.  Oxygen saturation remained above 88%.  CPAP increased from 5-14 cm water pressure.  Maximum sleep at 6 cm water pressure.  However this is not adequate to completely correct the obstructive sleep apnea.  CPAP was titrated to a maximum of 14 cm water       Testing:  · Averages about 7-8 hours recently since change in pressures to auto 10-15.  AHI 2.9.    DME Company: [employer]    Impression:  1. Obstructive sleep apnea    2. Persistent hypersomnolence disorder    3. Restless legs syndrome (RLS)    4. Overweight (BMI 25.0-29.9)        Plan:  Jose is now compliant. Keep same pressures.  Still some hypersomnolence but I would not adjust CPAP pressures for now and watch for next 6 months to see how he does.    RLS is controlled on Mirapex and he was advised to continue.    I reiterated the importance of effective treatment of obstructive sleep apnea with PAP machine.  Cardiovascular health risks of untreated sleep apnea were again reviewed.  Patient was asked to remain cautious if there is persistent hypersomnolence. The benefit of weight loss in reducing severity of obstructive sleep apnea was discussed.  Patient would benefit from adhering to a strict diet to achieve ideal BMI.     Change of PAP supplies regularly is important for effective use.  Change of cushion on the mask or plugs on nasal pillows along with disposable filters once every month and change of mask frame, tubing, headgear and Velcro straps every 6 months at the minimum was reiterated.    This patient is compliant with PAP machine and benefits from its use.  Apnea hypopneas index is corrected/improved.  Daytime hypersomnolence has resolved.     Patient will follow up in this clinic in 6 months  APRN    Thank you for allowing me to participate in your patient's care.    Stephon Sommers MD    Part of this note may be an electronic transcription/translation of spoken language to printed text using the Dragon Dictation System.

## 2019-02-15 NOTE — PROGRESS NOTES
Chief Complaint   Patient presents with   • Rectal Bleeding   • Changes in bowels       Jose Hurtado is a  69 y.o. male here for rectal bleeding.    HPI  68 yo established m presents today for rectal bleeding and change in bowel habits. He is a patient of Dr. Millan. He had his last colonoscopy in 6/2017. At that time it was poor prep and not much was seen. Patient does have hx adenomatous colon polyps as well as esophageal and prostate cancer. He admits for the past several weeks he has had some rectal bleeding off and on and change in bowels. Normally he will have a solid BM every 2-3 days. But lately he has been having several episodes a day of loose stools. He denies any dysphagia, reflux, abd pain, N&V, diarrhea, constipation or melena. He admits the stools are brown and loose but not diarrhea. He also has some fecal urgency with them. He tells the bright red blood has been on the paper when he wipes as well as in the toilet. He does have hx internal and external hemorrhoids. He denies any rectal pain or external hemorrhoids. He is on coumadin daily but admits soon he will be off of it for 10 days while he is getting Botox injections into his bladder. He would like to have the colonoscopy while he is off the coumadin. He admits his appetite is good and his weight is stable. He follows up with Dr. Phelan and Dr. Christine about his hx esophageal cancer (he is 4 years out from treatment).       Past Medical History:   Diagnosis Date   • Anemia    • Anti-phospholipid syndrome (CMS/HCC)     On lifelong anticoagulation therapy   • Arthritis     RIGHT KNEE   • Bladder disorder     LEAKAGE   ON MED  wers pads   • Community acquired pneumonia     HISTORY OF IN 2014   • Deep venous thrombosis (CMS/HCC) 2006, 2008    Left lower extremity multiple   • Depression    • Esophageal carcinoma (CMS/HCC) 12/31/2014    had chemo and radiation prior surgery   • Fatigue    • Hammer toe of left foot     had surgery   • Hemorrhoids     • HH (hiatus hernia)    • History of atrial fibrillation 2015    ONE EPISODE WHILE HOSPITALIZED   • History of kidney stones    • History of nephrolithiasis    • History of pancreatitis     PT STATES MANY YEARS AGO   • History of radiation therapy    • Hypertension    • Long-term (current) use of anticoagulants, INR goal 2.0-3.0    • Malignant neoplasm of prostate (CMS/HCC)    • Other hyperlipidemia 1/30/2018 January 30, 2018 lipid panel risk 12.8%   • Restless legs syndrome    • Squamous carcinoma     on the head   • Stasis dermatitis    • Warfarin anticoagulation        Past Surgical History:   Procedure Laterality Date   • APPENDECTOMY  1950   • BRONCHOSCOPY      (Diagnostic)   • CATARACT EXTRACTION Bilateral 2014   • COLONOSCOPY  12/15/2014    Complete / Description: EH, IH, torts, stool, follow-up colonoscopy due in 5 years.   • COLONOSCOPY N/A 6/13/2017    non-thrombosed external hemorrhoids, normal examined ileum, IH   • CYSTOSCOPY W/ LASER LITHOTRIPSY     • ENDOSCOPY N/A 6/13/2017    Procedure: ESOPHAGOGASTRODUODENOSCOPY WITH COLD BIOPSY;  Surgeon: Lake Gonzalez MD;  Location: [facility] ENDOSCOPY;  Service:    • ESOPHAGECTOMY      April 2015, stage IIB esophageal carcinoma, sub-total resection.   • ESOPHAGECTOMY      Esophagectomy Subtotal [place] Joe Procedure   • EXCISION LESION  08/2012    Removal of Squamous Cell CA on Head   • HAMMER TOE REPAIR  09/2014    Hammertoe Operation (Each Toe), 10/2014   • HAMMER TOE REPAIR Left 10/3/2017    Procedure: Left second third and fourth distal interphalangeal joint resection with flexor tenotomy;  Surgeon: Mulugeta Lira MD;  [facility];  Service:    • HERNIA REPAIR      incisional   • JEJUNOSTOMY      Laparoscopic   • JEJUNOSTOMY      tube removal    • KNEE SURGERY Bilateral 1967, 1973, 1981   • PATELLA SURGERY Left     removed   • PILONIDAL CYST / SINUS EXCISION     • NV TOTAL KNEE ARTHROPLASTY Right 3/26/2018    Procedure: TOTAL KNEE  ARTHROPLASTY;  Surgeon: Renny Solis MD;  Location: [facility];  Service: Orthopedics   • PROSTATECTOMY     • PYLOROPLASTY     • SPINAL FUSION  1998    C 5,6   • TONSILLECTOMY     • TONSILLECTOMY     • UPPER GASTROINTESTINAL ENDOSCOPY  12/15/2014    LA Grade D esophagitis, Pardo's, HH, multiple duodenal ulcers   • VENTRAL/INCISIONAL HERNIA REPAIR N/A 2016    Procedure: VENTRAL/INCISIONAL HERNIA REPAIR, open, with mesh, and component separation;  Surgeon: Darren Rivas MD;  Location: [facility] OR;  Service:        Scheduled Meds:    Continuous Infusions:  No current facility-administered medications for this visit.     PRN Meds:.    No Known Allergies    Social History     Socioeconomic History   • Marital status:      Spouse name: Lena   • Number of children: 0   • Years of education: College   • Highest education level: Not on file   Social Needs   • Financial resource strain: Not on file   • Food insecurity - worry: Not on file   • Food insecurity - inability: Not on file   • Transportation needs - medical: Not on file   • Transportation needs - non-medical: Not on file   Occupational History   • Occupation: Retired      Comment: Retired    Tobacco Use   • Smoking status: Former Smoker     Packs/day: 2.00     Years: 3.00     Pack years: 6.00     Types: Cigarettes     Last attempt to quit: 1974     Years since quittin.1   • Smokeless tobacco: Never Used   Substance and Sexual Activity   • Alcohol use: Yes     Frequency: 2-3 times a week   • Drug use: No   • Sexual activity: Defer   Other Topics Concern   • Not on file   Social History Narrative    self-employed        Family History   Problem Relation Age of Onset   • Cerebral aneurysm Mother         cerebral artery aneurysm ( age 56)   • Prostate cancer Brother 68   • Anxiety disorder Father    • Suicide Attempts Father          of suicide   • Cancer Father         bladder    • No Known Problems Brother    • Colon cancer Neg Hx    • Esophageal cancer Neg Hx    • Dementia Neg Hx        Review of Systems   Constitutional: Negative for appetite change, chills, diaphoresis, fatigue, fever and unexpected weight change.   HENT: Negative for nosebleeds, postnasal drip, sore throat, trouble swallowing and voice change.    Respiratory: Negative for cough, choking, chest tightness, shortness of breath and wheezing.    Cardiovascular: Negative for chest pain.   Gastrointestinal: Positive for anal bleeding. Negative for abdominal distention, abdominal pain, blood in stool, constipation, diarrhea, nausea, rectal pain and vomiting.   Endocrine: Negative for polydipsia, polyphagia and polyuria.   Musculoskeletal: Negative for gait problem.   Skin: Negative for rash and wound.   Allergic/Immunologic: Negative for food allergies.   Neurological: Negative for dizziness, speech difficulty and light-headedness.   Psychiatric/Behavioral: Negative for confusion, self-injury, sleep disturbance and suicidal ideas.       Vitals:    02/15/19 1525   BP: 134/72   Temp: 98 °F (36.7 °C)       Physical Exam   Constitutional: He is oriented to person, place, and time. He appears well-developed and well-nourished. He does not appear ill. No distress.   HENT:   Head: Normocephalic.   Eyes: Pupils are equal, round, and reactive to light.   Cardiovascular: Normal rate, regular rhythm and normal heart sounds.   Pulmonary/Chest: Effort normal and breath sounds normal.   Abdominal: Soft. Bowel sounds are normal. He exhibits no distension and no mass. There is no hepatosplenomegaly. There is no tenderness. There is no rebound and no guarding. No hernia.   Musculoskeletal: Normal range of motion.   Neurological: He is alert and oriented to person, place, and time.   Skin: Skin is warm and dry.   Psychiatric: He has a normal mood and affect. His speech is normal and behavior is normal. Judgment normal.       No images are  attached to the encounter.    Assessment & plan     1. Rectal bleeding  - Case Request; Standing  - Case Request    2. Change in bowel habits  - Case Request; Standing  - Case Request    3. Adenomatous polyp of colon, unspecified part of colon  - Case Request; Standing  - Case Request    4. Malignant neoplasm of prostate (CMS/HCC)  - Case Request; Standing  - Case Request    5. Adenocarcinoma of esophagus (CMS/HCC)  - Case Request; Standing  - Case Request    Hgb was stable at 11.2 on 2/13/19. Given his hx and current symptoms recommend Colonoscopy with Dr. Millan for further evaluation. Patient is agreeable to proceed with the scope. Call office with any issues.

## 2019-02-15 NOTE — TELEPHONE ENCOUNTER
Pt is scheduled to have Botox on 2/21/19 & is to stop coumadin 7 days prior to & then again 3 days post.    Also pt is scheduled for a colonoscopy with  on 2/26/19 & is required to stop coumadin 2 days prior to.    Pt is asking if  wants a heparin bridge performed?    Pls advise.    Pt can be reached #[phone].

## 2019-02-18 ENCOUNTER — TREATMENT (OUTPATIENT)
Dept: PHYSICAL THERAPY | Facility: CLINIC | Age: 69
End: 2019-02-18

## 2019-02-18 DIAGNOSIS — G89.29 CHRONIC PAIN OF LEFT KNEE: Primary | ICD-10-CM

## 2019-02-18 DIAGNOSIS — M25.562 CHRONIC PAIN OF LEFT KNEE: Primary | ICD-10-CM

## 2019-02-18 DIAGNOSIS — R26.9 GAIT DISTURBANCE: ICD-10-CM

## 2019-02-18 PROCEDURE — 97110 THERAPEUTIC EXERCISES: CPT | Performed by: PHYSICAL THERAPIST

## 2019-02-18 PROCEDURE — 97530 THERAPEUTIC ACTIVITIES: CPT | Performed by: PHYSICAL THERAPIST

## 2019-02-18 NOTE — PROGRESS NOTES
Re-Assessment / Re-Certification      Patient: Jose Hurtado   : 1950  Diagnosis/ICD-10 Code:  Chronic pain of left knee [M25.562, G89.29]  Referring practitioner: FAYE Stern  Date of Initial Visit: 2019  Today's Date: 2019  Patient seen for 5 sessions      Subjective:   Jose Hurtado reports: I haven't exercised as much as I should have the past 2 weeks. Tired of the cold weather. My knee is doing better. Feels more stable      Subjective Questionnaire: LEFS: NT  Clinical Progress: improved  Home Program Compliance: Yes  Treatment has included: therapeutic exercise, neuromuscular re-education, manual therapy, therapeutic activity and gait training    Objective   Hip abduction 5/5  Knee AROM: 5-125    Assessment/Plan   Short Term Goals (3 weeks) MET  1. Pt to exhibit 5-125 degrees of knee AROM to allow for improved gait and stairclimbing  2. Pt to report less frequent buckling  3. Pt to exhibit 4/5 L hip abduction strength to allow for improved stability with gait and less valgus at knee    Long Term Goals: (6 weeks)  1. Pt to ambulate community distances without assistive device and without buckling PROGRESSING  2. Pt to report min to no pain with stairclimbing MET  3. Pt to score > 45/80 on LEFS NT  4. Pt to exhibit 5/5 hip abduction strength to allow for more strenuous ADL's and recreational activities MET  Progress toward previous goals: Partially Met        Recommendations: Continue as planned  Timeframe: 1 month  Prognosis to achieve goals: good    PT Signature: Caroline Vasquez, PT  [place] License # [id number]    Based upon review of the patient's progress and continued therapy plan, it is my medical opinion that Jose Hurtado should continue physical therapy treatment at [facility] PHYSICAL THERAPY  [address] [place] [id number] [phone].    Signature: __________________________________  Luba Osuna APRN    Manual Therapy:    5      mins  32668;  Therapeutic Exercise:    30     mins  46967;     Neuromuscular Wil:        mins  47342;    Therapeutic Activity:     5     mins  96349;     Gait Training:           mins  26467;     Ultrasound:          mins  37636;    Electrical Stimulation:         mins  57601 ( );  Dry Needling          mins self-pay    Timed Treatment:   40   mins   Total Treatment:     40   mins

## 2019-02-18 NOTE — TELEPHONE ENCOUNTER
Probably not. However just depends on the scope of the procedure being performed. Therefore he should direct this question to the specialist performing procedures.

## 2019-02-19 ENCOUNTER — OFFICE VISIT (OUTPATIENT)
Dept: ONCOLOGY | Facility: CLINIC | Age: 69
End: 2019-02-19

## 2019-02-19 ENCOUNTER — LAB (OUTPATIENT)
Dept: OTHER | Facility: HOSPITAL | Age: 69
End: 2019-02-19

## 2019-02-19 VITALS
HEART RATE: 70 BPM | HEIGHT: 77 IN | RESPIRATION RATE: 18 BRPM | OXYGEN SATURATION: 98 % | DIASTOLIC BLOOD PRESSURE: 67 MMHG | TEMPERATURE: 97.5 F | WEIGHT: 235 LBS | SYSTOLIC BLOOD PRESSURE: 136 MMHG | BODY MASS INDEX: 27.75 KG/M2

## 2019-02-19 DIAGNOSIS — C15.9 ADENOCARCINOMA OF ESOPHAGUS (HCC): ICD-10-CM

## 2019-02-19 DIAGNOSIS — C15.9 ADENOCARCINOMA OF ESOPHAGUS (HCC): Primary | ICD-10-CM

## 2019-02-19 LAB
BASOPHILS # BLD AUTO: 0.03 10*3/MM3 (ref 0–0.2)
BASOPHILS NFR BLD AUTO: 0.7 % (ref 0–1.5)
DEPRECATED RDW RBC AUTO: 48.6 FL (ref 37–54)
EOSINOPHIL # BLD AUTO: 0.21 10*3/MM3 (ref 0–0.4)
EOSINOPHIL NFR BLD AUTO: 5.1 % (ref 0.3–6.2)
ERYTHROCYTE [DISTWIDTH] IN BLOOD BY AUTOMATED COUNT: 14.7 % (ref 12.3–15.4)
HCT VFR BLD AUTO: 33.8 % (ref 37.5–51)
HGB BLD-MCNC: 11.1 G/DL (ref 13–17.7)
IMM GRANULOCYTES # BLD AUTO: 0.03 10*3/MM3 (ref 0–0.05)
IMM GRANULOCYTES NFR BLD AUTO: 0.7 % (ref 0–0.5)
LYMPHOCYTES # BLD AUTO: 1.01 10*3/MM3 (ref 0.7–3.1)
LYMPHOCYTES NFR BLD AUTO: 24.5 % (ref 19.6–45.3)
MCH RBC QN AUTO: 29.8 PG (ref 26.6–33)
MCHC RBC AUTO-ENTMCNC: 32.8 G/DL (ref 31.5–35.7)
MCV RBC AUTO: 90.6 FL (ref 79–97)
MONOCYTES # BLD AUTO: 0.51 10*3/MM3 (ref 0.1–0.9)
MONOCYTES NFR BLD AUTO: 12.3 % (ref 5–12)
NEUTROPHILS # BLD AUTO: 2.34 10*3/MM3 (ref 1.4–7)
NEUTROPHILS NFR BLD AUTO: 56.7 % (ref 42.7–76)
NRBC BLD AUTO-RTO: 0 /100 WBC (ref 0–0)
PLATELET # BLD AUTO: 140 10*3/MM3 (ref 140–450)
PMV BLD AUTO: 9.3 FL (ref 6–12)
RBC # BLD AUTO: 3.73 10*6/MM3 (ref 4.14–5.8)
WBC NRBC COR # BLD: 4.13 10*3/MM3 (ref 3.4–10.8)

## 2019-02-19 PROCEDURE — 99214 OFFICE O/P EST MOD 30 MIN: CPT | Performed by: INTERNAL MEDICINE

## 2019-02-19 PROCEDURE — 85025 COMPLETE CBC W/AUTO DIFF WBC: CPT | Performed by: INTERNAL MEDICINE

## 2019-02-19 PROCEDURE — 36415 COLL VENOUS BLD VENIPUNCTURE: CPT

## 2019-02-19 NOTE — PROGRESS NOTES
Subjective .     REASONS FOR FOLLOWUP:  Esophageal cancer, cytopenias     HISTORY OF PRESENT ILLNESS:  The patient is a 69 y.o. year old male  who is here for follow-up with the above-mentioned history.    No new problems.  Denies pain.  Eating well.    Continues to have nausea with emesis on average once every couple of months.  Unchanged.    Past Medical History:   Diagnosis Date   • Anemia    • Anti-phospholipid syndrome (CMS/HCC)     On lifelong anticoagulation therapy   • Arthritis     RIGHT KNEE   • Bladder disorder     LEAKAGE   ON MED  wers pads   • Community acquired pneumonia     HISTORY OF IN 2014   • Deep venous thrombosis (CMS/HCC) 2006, 2008    Left lower extremity multiple   • Depression    • Esophageal carcinoma (CMS/HCC) 12/31/2014    had chemo and radiation prior surgery   • Fatigue    • Hammer toe of left foot     had surgery   • Hemorrhoids    • HH (hiatus hernia)    • History of atrial fibrillation 2015    ONE EPISODE WHILE HOSPITALIZED   • History of kidney stones    • History of nephrolithiasis    • History of pancreatitis     PT STATES MANY YEARS AGO   • History of radiation therapy    • Hypertension    • Long-term (current) use of anticoagulants, INR goal 2.0-3.0    • Malignant neoplasm of prostate (CMS/HCC)    • Other hyperlipidemia 1/30/2018 January 30, 2018 lipid panel risk 12.8%   • Restless legs syndrome    • Squamous 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   • COLONOSCOPY N/A 6/13/2017    non-thrombosed external hemorrhoids, normal examined ileum, IH   • CYSTOSCOPY W/ LASER LITHOTRIPSY     • ENDOSCOPY N/A 6/13/2017    Procedure: ESOPHAGOGASTRODUODENOSCOPY WITH COLD BIOPSY;   Surgeon: Lake Gonzalez MD;  Location: [facility] ENDOSCOPY;  Service:    • ESOPHAGECTOMY      April 2015, stage IIB esophageal carcinoma, sub-total resection.   • ESOPHAGECTOMY      Esophagectomy Subtotal Andrea Joe Procedure   • EXCISION LESION  08/2012    Removal of Squamous Cell CA on Head   • HAMMER TOE REPAIR  09/2014    Hammertoe Operation (Each Toe), 10/2014   • HAMMER TOE REPAIR Left 10/3/2017    Procedure: Left second third and fourth distal interphalangeal joint resection with flexor tenotomy;  Surgeon: Mulugeta Lira MD;  Location: [facility] OR [facility];  Service:    • HERNIA REPAIR      incisional   • JEJUNOSTOMY      Laparoscopic   • JEJUNOSTOMY      tube removal    • KNEE SURGERY Bilateral 1967, 1973, 1981   • PATELLA SURGERY Left     removed   • PILONIDAL CYST / SINUS EXCISION     • TN TOTAL KNEE ARTHROPLASTY Right 3/26/2018    Procedure: TOTAL KNEE ARTHROPLASTY;  Surgeon: Renny Solis MD;  Location: [facility] MAIN OR;  Service: Orthopedics   • PROSTATECTOMY  2010   • PYLOROPLASTY     • SPINAL FUSION  02/1998    C 5,6   • TONSILLECTOMY  1955   • TONSILLECTOMY     • UPPER GASTROINTESTINAL ENDOSCOPY  12/15/2014    LA Grade D esophagitis, Pardo's, HH, multiple duodenal ulcers   • VENTRAL/INCISIONAL HERNIA REPAIR N/A 4/14/2016    Procedure: VENTRAL/INCISIONAL HERNIA REPAIR, open, with mesh, and component separation;  Surgeon: Darren Rivas MD;  Location: [facility] OR;  Service:        HEMATOLOGIC/ONCOLOGIC HISTORY:  (History from previous dates can be found in the separate document.)    MEDICATIONS    Current Outpatient Medications:   •  PARoxetine (PAXIL) 30 MG tablet, Take 1 tablet by mouth Every Night., Disp: 90 tablet, Rfl: 1  •  pramipexole (MIRAPEX) 1.5 MG tablet, Take 2 tablets by mouth Every Night., Disp: 43300 tablet, Rfl: 1  •  warfarin (COUMADIN) 5 MG tablet, TAKE ONE TABLET BY MOUTH DAILY - STOP 6 DAYS BEFORE SURGERY AND BRIDGE WITH LOVENOX, Disp: 60 tablet, Rfl: 2    ALLERGIES:    No Known Allergies    SOCIAL HISTORY:       Social History     Socioeconomic History   • Marital status:      Spouse name: Lena   • Number of children: 0   • Years of education: College   • Highest education level: Not on file   Social Needs   • Financial resource strain: Not on file   • Food insecurity - worry: Not on file   • Food insecurity - inability: Not on file   • Transportation needs - medical: Not on file   • Transportation needs - non-medical: Not on file   Occupational History   • Occupation: Retired      Comment: Retired    Tobacco Use   • Smoking status: Former Smoker     Packs/day: 2.00     Years: 3.00     Pack years: 6.00     Types: Cigarettes     Last attempt to quit:      Years since quittin.1   • Smokeless tobacco: Never Used   Substance and Sexual Activity   • Alcohol use: Yes     Frequency: 2-3 times a week     Comment: 2-3 x per week   • Drug use: No   • Sexual activity: Defer   Other Topics Concern   • Not on file   Social History Narrative    self-employed          FAMILY HISTORY:  Family History   Problem Relation Age of Onset   • Cerebral aneurysm Mother         cerebral artery aneurysm ( age 56)   • Prostate cancer Brother 68   • Anxiety disorder Father    • Suicide Attempts Father          of suicide   • Cancer Father         bladder   • No Known Problems Brother    • Colon cancer Neg Hx    • Esophageal cancer Neg Hx    • Dementia Neg Hx        REVIEW OF SYSTEMS:    Review of Systems   Constitutional: Negative for activity change.   HENT: Negative for nosebleeds and trouble swallowing.    Respiratory: Negative for shortness of breath and wheezing.    Cardiovascular: Negative for chest pain and palpitations.   Gastrointestinal: Positive for nausea. Negative for constipation and diarrhea.   Genitourinary: Negative for dysuria and hematuria.   Musculoskeletal: Negative for arthralgias and myalgias.   Neurological: Negative for  "seizures and syncope.   Hematological: Negative for adenopathy. Does not bruise/bleed easily.   Psychiatric/Behavioral: Negative for confusion.           Objective    Vitals:    02/19/19 0850   BP: 136/67   Pulse: 70   Resp: 18   Temp: 97.5 °F (36.4 °C)   TempSrc: Oral   SpO2: 98%   Weight: 107 kg (235 lb)   Height: 195.6 cm (77.01\")   PainSc: 0-No pain     Current Status 2/19/2019   ECOG score 0      PHYSICAL EXAM:    CONSTITUTIONAL:  Vital signs reviewed.  No distress, looks comfortable.  EYES:  Conjunctiva and lids unremarkable.  PERRLA  EARS,NOSE,MOUTH,THROAT:  Ears and nose appear unremarkable.  Lips, teeth, gums appear unremarkable.  RESPIRATORY:  Normal respiratory effort.  Lungs clear to auscultation bilaterally.  CARDIOVASCULAR:  Normal S1, S2.  No murmurs rubs or gallops.  No significant lower extremity edema.  GASTROINTESTINAL: Abdomen appears unremarkable.  Nontender.  No hepatomegaly.  No splenomegaly.  LYMPHATIC:  No cervical, supraclavicular, axillary lymphadenopathy.  SKIN:  Warm.  No rashes.  PSYCHIATRIC:  Normal judgment and insight.  Normal mood and affect.    RECENT LABS:        WBC   Date/Time Value Ref Range Status   02/19/2019 08:39 AM 4.13 3.40 - 10.80 10*3/mm3 Final   02/13/2019 10:14 AM 4.0 3.4 - 10.8 x10E3/uL Final     Hemoglobin   Date/Time Value Ref Range Status   02/19/2019 08:39 AM 11.1 (L) 13.0 - 17.7 g/dL Final     Platelets   Date/Time Value Ref Range Status   02/19/2019 08:39  140 - 450 10*3/mm3 Final       Assessment/Plan     ASSESSMENT:  There are no diagnoses linked to this encounter.    *Esophageal adenocarcinoma. Initially T2N0M0. After neoadjuvant carboplatin/Taxol with radiation, achieved a pathologic CR at resection, 4/24/2015.   · As per NCCN guidelines, plan CAT scans every 6 months x1 year, then every 6 to 9 months on years 2 and years 3. Defer any surveillance EGDs to Dr. Gonzalez if he feels they are appropriate.   · Also as per NCCN guidelines, plan H and P every 3 " to 6 months on years 1 and years 2, then every 6 to 12 months' time on years 3 through years 5 and then annually.   · EGD 6/13/17 by Dr. Gonzalez: No evidence of recurrence.  · CT scan 3/2/18 (this completed 3 years of surveillance CT): No evidence of recurrence.  Plan no more surveillance CT.  Remains in remission.    *Recurrent DVT, prior to cancer diagnosis.  Dr. George Mooney manages his Coumadin.   No signs of further clotting.    *Leukocytopenia, anemia, thrombocytopenia.    He had a white blood cell count in the upper 3s and a hemoglobin in the upper 12s with a normal platelet count prior to beginning chemotherapy. We will monitor this.   WBC and PLT normal.  HPV mildly low.    *Persistent cough.  He has seen Dr. Maher Sayied for this.    He did not complain of this today.    *Emesis occurring 5 hours after eating on average once every month or 2.  No nausea otherwise.  Denies dysphagia or odynophagia.    Unchanged.  Occurring on average once every couple of months.    PLAN:  · M.D. CBC 6 months  · No more surveillance CT scans.  · He does not have a port.

## 2019-02-20 ENCOUNTER — APPOINTMENT (OUTPATIENT)
Dept: SLEEP MEDICINE | Facility: HOSPITAL | Age: 69
End: 2019-02-20
Attending: INTERNAL MEDICINE

## 2019-02-26 ENCOUNTER — ANESTHESIA EVENT (OUTPATIENT)
Dept: GASTROENTEROLOGY | Facility: HOSPITAL | Age: 69
End: 2019-02-26

## 2019-02-26 ENCOUNTER — HOSPITAL ENCOUNTER (OUTPATIENT)
Facility: HOSPITAL | Age: 69
Setting detail: HOSPITAL OUTPATIENT SURGERY
Discharge: HOME | End: 2019-02-26
Attending: INTERNAL MEDICINE | Admitting: INTERNAL MEDICINE

## 2019-02-26 ENCOUNTER — ANESTHESIA (OUTPATIENT)
Dept: GASTROENTEROLOGY | Facility: HOSPITAL | Age: 69
End: 2019-02-26

## 2019-02-26 VITALS
HEIGHT: 77 IN | WEIGHT: 226.8 LBS | OXYGEN SATURATION: 100 % | DIASTOLIC BLOOD PRESSURE: 79 MMHG | RESPIRATION RATE: 16 BRPM | SYSTOLIC BLOOD PRESSURE: 140 MMHG | BODY MASS INDEX: 26.78 KG/M2 | TEMPERATURE: 97.6 F | HEART RATE: 65 BPM

## 2019-02-26 DIAGNOSIS — R19.4 CHANGE IN BOWEL HABITS: ICD-10-CM

## 2019-02-26 DIAGNOSIS — C15.9 ADENOCARCINOMA OF ESOPHAGUS (HCC): ICD-10-CM

## 2019-02-26 DIAGNOSIS — K62.5 RECTAL BLEEDING: ICD-10-CM

## 2019-02-26 DIAGNOSIS — C61 MALIGNANT NEOPLASM OF PROSTATE (HCC): ICD-10-CM

## 2019-02-26 DIAGNOSIS — D12.6 ADENOMATOUS POLYP OF COLON, UNSPECIFIED PART OF COLON: ICD-10-CM

## 2019-02-26 PROCEDURE — 25010000002 PROPOFOL 10 MG/ML EMULSION: Performed by: NURSE ANESTHETIST, CERTIFIED REGISTERED

## 2019-02-26 PROCEDURE — 88305 TISSUE EXAM BY PATHOLOGIST: CPT | Performed by: INTERNAL MEDICINE

## 2019-02-26 PROCEDURE — S0260 H&P FOR SURGERY: HCPCS | Performed by: INTERNAL MEDICINE

## 2019-02-26 PROCEDURE — 45380 COLONOSCOPY AND BIOPSY: CPT | Performed by: INTERNAL MEDICINE

## 2019-02-26 RX ORDER — PROPOFOL 10 MG/ML
VIAL (ML) INTRAVENOUS CONTINUOUS PRN
Status: DISCONTINUED | OUTPATIENT
Start: 2019-02-26 | End: 2019-02-26 | Stop reason: SURG

## 2019-02-26 RX ORDER — SODIUM CHLORIDE 0.9 % (FLUSH) 0.9 %
3 SYRINGE (ML) INJECTION EVERY 12 HOURS SCHEDULED
Status: DISCONTINUED | OUTPATIENT
Start: 2019-02-26 | End: 2019-02-26 | Stop reason: HOSPADM

## 2019-02-26 RX ORDER — SODIUM CHLORIDE 0.9 % (FLUSH) 0.9 %
3-10 SYRINGE (ML) INJECTION AS NEEDED
Status: DISCONTINUED | OUTPATIENT
Start: 2019-02-26 | End: 2019-02-26 | Stop reason: HOSPADM

## 2019-02-26 RX ORDER — SODIUM CHLORIDE, SODIUM LACTATE, POTASSIUM CHLORIDE, CALCIUM CHLORIDE 600; 310; 30; 20 MG/100ML; MG/100ML; MG/100ML; MG/100ML
30 INJECTION, SOLUTION INTRAVENOUS CONTINUOUS PRN
Status: DISCONTINUED | OUTPATIENT
Start: 2019-02-26 | End: 2019-02-26 | Stop reason: HOSPADM

## 2019-02-26 RX ORDER — LIDOCAINE HYDROCHLORIDE 20 MG/ML
INJECTION, SOLUTION INFILTRATION; PERINEURAL AS NEEDED
Status: DISCONTINUED | OUTPATIENT
Start: 2019-02-26 | End: 2019-02-26 | Stop reason: SURG

## 2019-02-26 RX ADMIN — LIDOCAINE HYDROCHLORIDE 60 MG: 20 INJECTION, SOLUTION INFILTRATION; PERINEURAL at 13:08

## 2019-02-26 RX ADMIN — SODIUM CHLORIDE, POTASSIUM CHLORIDE, SODIUM LACTATE AND CALCIUM CHLORIDE 30 ML/HR: 600; 310; 30; 20 INJECTION, SOLUTION INTRAVENOUS at 11:58

## 2019-02-26 RX ADMIN — PROPOFOL 180 MCG/KG/MIN: 10 INJECTION, EMULSION INTRAVENOUS at 13:08

## 2019-02-26 NOTE — ANESTHESIA POSTPROCEDURE EVALUATION
Patient: Jose Hurtado    Procedure Summary     Date:  02/26/19 Room / Location:  [facility] ENDOSCOPY 1 /  [facility]    Anesthesia Start:  1258 Anesthesia Stop:  1329    Procedure:  COLONOSCOPY with Cold Polypectomy (N/A ) Diagnosis:       Polyp of colon      (Rectal bleeding [K62.5])      (Change in bowel habits [R19.4])      (Adenomatous polyp of colon, unspecified part of colon [D12.6])      (Malignant neoplasm of prostate (CMS/HCC) [C61])      (Adenocarcinoma of esophagus (CMS/HCC) [C15.9])    Surgeon:  Mulugeta Millan MD Provider:  Darian Pérez MD    Anesthesia Type:  MAC ASA Status:  3          Anesthesia Type: MAC  Last vitals  BP   96/67 (02/26/19 1343)   Temp   36.4 °C (97.6 °F) (02/26/19 1151)   Pulse   65 (02/26/19 1343)   Resp   16 (02/26/19 1343)     SpO2   99 % (02/26/19 1343)     Post Anesthesia Care and Evaluation    Patient location during evaluation: bedside  Patient participation: complete - patient participated  Level of consciousness: awake and alert  Pain score: 0  Pain management: adequate  Airway patency: patent  Anesthetic complications: No anesthetic complications  PONV Status: none  Cardiovascular status: acceptable  Respiratory status: acceptable  Hydration status: acceptable  Post Neuraxial Block status: Motor and sensory function returned to baseline

## 2019-02-26 NOTE — ANESTHESIA PREPROCEDURE EVALUATION
Anesthesia Evaluation     Patient summary reviewed                Airway   Mallampati: III  TM distance: >3 FB  Neck ROM: full  No difficulty expected  Dental    (+) upper dentures    Pulmonary     breath sounds clear to auscultation  Cardiovascular   Exercise tolerance: good (4-7 METS)    Rhythm: regular  Rate: normal        Neuro/Psych  GI/Hepatic/Renal/Endo      Musculoskeletal     Abdominal    Substance History      OB/GYN          Other                      Anesthesia Plan    ASA 3     MAC     intravenous induction   Anesthetic plan, all risks, benefits, and alternatives have been provided, discussed and informed consent has been obtained with: patient.

## 2019-02-27 LAB
CYTO UR: NORMAL
LAB AP CASE REPORT: NORMAL
PATH REPORT.FINAL DX SPEC: NORMAL
PATH REPORT.GROSS SPEC: NORMAL

## 2019-02-28 ENCOUNTER — TELEPHONE (OUTPATIENT)
Dept: GASTROENTEROLOGY | Facility: CLINIC | Age: 69
End: 2019-02-28

## 2019-02-28 ENCOUNTER — TREATMENT (OUTPATIENT)
Dept: PHYSICAL THERAPY | Facility: CLINIC | Age: 69
End: 2019-02-28

## 2019-02-28 DIAGNOSIS — R26.9 GAIT DISTURBANCE: ICD-10-CM

## 2019-02-28 DIAGNOSIS — M25.562 CHRONIC PAIN OF LEFT KNEE: Primary | ICD-10-CM

## 2019-02-28 DIAGNOSIS — G89.29 CHRONIC PAIN OF LEFT KNEE: Primary | ICD-10-CM

## 2019-02-28 PROCEDURE — 97140 MANUAL THERAPY 1/> REGIONS: CPT | Performed by: PHYSICAL THERAPIST

## 2019-02-28 PROCEDURE — 97110 THERAPEUTIC EXERCISES: CPT | Performed by: PHYSICAL THERAPIST

## 2019-02-28 NOTE — TELEPHONE ENCOUNTER
----- Message from Mulugeta BLACKWELL MD sent at 2/27/2019  4:25 PM EST -----  Regarding: Biopsy results  Okay to call results, recommend follow-up colonoscopy in 3-5 years.  If bowel pattern changes persist, can follow-up in office to discuss workup of small bowel.  ----- Message -----  From: Lab, Background User  Sent: 2/27/2019  12:14 PM  To: Mulugeta BLACKWELL MD

## 2019-03-06 LAB — INR PPP: 2.2 (ref 2–3)

## 2019-03-08 ENCOUNTER — ANTICOAGULATION VISIT (OUTPATIENT)
Dept: INTERNAL MEDICINE | Age: 69
End: 2019-03-08

## 2019-03-22 LAB — INR PPP: 2.4 (ref 2–3)

## 2019-03-25 ENCOUNTER — ANTICOAGULATION VISIT (OUTPATIENT)
Dept: INTERNAL MEDICINE | Age: 69
End: 2019-03-25

## 2019-04-09 ENCOUNTER — CLINICAL SUPPORT (OUTPATIENT)
Dept: ORTHOPEDIC SURGERY | Facility: CLINIC | Age: 69
End: 2019-04-09

## 2019-04-09 VITALS — WEIGHT: 231 LBS | HEIGHT: 77 IN | BODY MASS INDEX: 27.28 KG/M2

## 2019-04-09 DIAGNOSIS — M17.12 PRIMARY OSTEOARTHRITIS OF LEFT KNEE: Primary | ICD-10-CM

## 2019-04-09 PROCEDURE — 20610 DRAIN/INJ JOINT/BURSA W/O US: CPT | Performed by: NURSE PRACTITIONER

## 2019-04-09 RX ORDER — METHYLPREDNISOLONE ACETATE 80 MG/ML
80 INJECTION, SUSPENSION INTRA-ARTICULAR; INTRALESIONAL; INTRAMUSCULAR; SOFT TISSUE
Status: COMPLETED | OUTPATIENT
Start: 2019-04-09 | End: 2019-04-09

## 2019-04-09 RX ADMIN — METHYLPREDNISOLONE ACETATE 80 MG: 80 INJECTION, SUSPENSION INTRA-ARTICULAR; INTRALESIONAL; INTRAMUSCULAR; SOFT TISSUE at 08:59

## 2019-04-09 NOTE — PROGRESS NOTES
4/9/2019    Jose Hurtado is here today for worsening knee pain. Pt has undergone injection of the knee in the past with good resolution of symptoms. Pt is requesting a repeat injection.     KNEE Injection Procedure Note:    Large Joint Arthrocentesis: L knee  Date/Time: 4/9/2019 8:59 AM  Consent given by: patient  Site marked: site marked  Timeout: Immediately prior to procedure a time out was called to verify the correct patient, procedure, equipment, support staff and site/side marked as required   Supporting Documentation  Indications: pain   Procedure Details  Location: knee - L knee  Preparation: Patient was prepped and draped in the usual sterile fashion  Needle size: 22 G  Approach: anterolateral  Medications administered: 80 mg methylPREDNISolone acetate 80 MG/ML; 2 mL lidocaine (cardiac) 20 MG/ML  Patient tolerance: patient tolerated the procedure well with no immediate complications      Prior to injection risks were discussed including pain, infection, elevated blood sugar.  Patient verbalized understanding would like to proceed with injection    At the conclusion of the injection I discussed the importance of continued quad strengthening exercises on a daily basis. I will see the patient back if the symptoms should fail to improve or worsen.    Luba Osuna APRN  4/9/2019

## 2019-04-15 ENCOUNTER — OFFICE VISIT (OUTPATIENT)
Dept: INTERNAL MEDICINE | Age: 69
End: 2019-04-15

## 2019-04-15 VITALS
HEART RATE: 76 BPM | SYSTOLIC BLOOD PRESSURE: 138 MMHG | DIASTOLIC BLOOD PRESSURE: 78 MMHG | WEIGHT: 231 LBS | HEIGHT: 77 IN | OXYGEN SATURATION: 97 % | BODY MASS INDEX: 27.28 KG/M2 | TEMPERATURE: 98.5 F

## 2019-04-15 DIAGNOSIS — G25.81 RLS (RESTLESS LEGS SYNDROME): Primary | ICD-10-CM

## 2019-04-15 DIAGNOSIS — D68.61 ANTI-PHOSPHOLIPID SYNDROME (HCC): Chronic | ICD-10-CM

## 2019-04-15 DIAGNOSIS — F32.A DEPRESSION, UNSPECIFIED DEPRESSION TYPE: ICD-10-CM

## 2019-04-15 DIAGNOSIS — D64.9 ANEMIA, UNSPECIFIED TYPE: ICD-10-CM

## 2019-04-15 DIAGNOSIS — F32.9 MAJOR DEPRESSIVE DISORDER WITH CURRENT ACTIVE EPISODE, UNSPECIFIED DEPRESSION EPISODE SEVERITY, UNSPECIFIED WHETHER RECURRENT: Chronic | ICD-10-CM

## 2019-04-15 PROCEDURE — 99214 OFFICE O/P EST MOD 30 MIN: CPT | Performed by: INTERNAL MEDICINE

## 2019-04-15 RX ORDER — ROPINIROLE 0.5 MG/1
0.5 TABLET, FILM COATED ORAL NIGHTLY
Qty: 60 TABLET | Refills: 3 | Status: SHIPPED | OUTPATIENT
Start: 2019-04-15 | End: 2019-04-22

## 2019-04-15 RX ORDER — PAROXETINE 30 MG/1
30 TABLET, FILM COATED ORAL EVERY MORNING
Qty: 90 TABLET | Refills: 1
Start: 2019-04-15 | End: 2019-10-09 | Stop reason: SDUPTHER

## 2019-04-15 RX ORDER — WARFARIN SODIUM 5 MG/1
5 TABLET ORAL DAILY
Qty: 90 TABLET | Refills: 2 | Status: SHIPPED | OUTPATIENT
Start: 2019-04-15 | End: 2020-01-31

## 2019-04-15 NOTE — ASSESSMENT & PLAN NOTE
He would like to have INR monitored here going forward.   Today's INR from home was 2.3. Taking 5 mg daily, except 2.5 mg 3 days of week.   Send Rx for warfarin 5 mg strength to pharmacy. Schedule next INR here in 2 weeks.   Goal INR 2-3.

## 2019-04-15 NOTE — PROGRESS NOTES
[facility]  RICHARDSON HEALY M.D.      Jose FITCH Hurtado / 69 y.o. / male  04/15/2019      ASSESSMENT & PLAN:    Problem List Items Addressed This Visit        High    RLS (restless legs syndrome) - Primary (Chronic)    Current Assessment & Plan     Uncontrolled. Discontinue pramipexole and start ropinirole 0.5 mg 1 hour before bedtime and then at bedtime. Check CBC and iron studies. Try taking paroxetine in the morning instead of bedtime. Educational handout given on RLS.          Relevant Medications    rOPINIRole (REQUIP) 0.5 MG tablet    Other Relevant Orders    CBC & Differential    Iron and TIBC    Ferritin       Medium    Depression (Chronic)    Overview     On paroxetine 30 mg qd.          Current Assessment & Plan     Due to restless leg syndrome try taking in AM instead. Will consider trial off medication on follow-up. Discussed with patient.          Relevant Medications    PARoxetine (PAXIL) 30 MG tablet    Anti-phospholipid syndrome (CMS/HCC) (Chronic)    Overview     DVT LLE in 2003 and 2006  Antiphospholipid syndrome  On life-long AC on warfarin.   GOAL INR : 2 -3          Current Assessment & Plan     He would like to have INR monitored here going forward.   Today's INR from home was 2.3. Taking 5 mg daily, except 2.5 mg 3 days of week.   Send Rx for warfarin 5 mg strength to pharmacy. Schedule next INR here in 2 weeks.   Goal INR 2-3.          Relevant Medications    warfarin (COUMADIN) 5 MG tablet      Other Visit Diagnoses     Anemia, unspecified type        Relevant Orders    CBC & Differential    Iron and TIBC    Ferritin        Orders Placed This Encounter   Procedures   • Iron and TIBC   • Ferritin   • CBC & Differential     New Medications Ordered This Visit   Medications   • rOPINIRole (REQUIP) 0.5 MG tablet     Sig: Take 1 tablet by mouth Every Night. Take 1 hour before bedtime.     Dispense:  60 tablet     Refill:  3   • PARoxetine (PAXIL) 30 MG tablet     Sig: Take 1 tablet by mouth  "Every Morning.     Dispense:  90 tablet     Refill:  1   • warfarin (COUMADIN) 5 MG tablet     Sig: Take 1 tablet by mouth Daily.     Dispense:  90 tablet     Refill:  2       Summary/Discussion:  ·     No Follow-up on file.  6/20/2019  ____________________________________________________________________    MEDICATIONS  Current Outpatient Medications on File Prior to Visit   Medication Sig Dispense Refill   • PARoxetine (PAXIL) 30 MG tablet Take 1 tablet by mouth Every Night. 90 tablet 1   • pramipexole (MIRAPEX) 1.5 MG tablet Take 2 tablets by mouth Every Night. 69263 tablet 1   • warfarin (COUMADIN) 5 MG tablet TAKE ONE TABLET BY MOUTH DAILY - STOP 6 DAYS BEFORE SURGERY AND BRIDGE WITH LOVENOX 60 tablet 2     No current facility-administered medications on file prior to visit.         VITALS    Visit Vitals  /78   Pulse 76   Temp 98.5 °F (36.9 °C)   Ht 195.6 cm (77.01\")   Wt 105 kg (231 lb)   SpO2 97%   BMI 27.39 kg/m²       BP Readings from Last 3 Encounters:   04/15/19 138/78   02/26/19 140/79   02/19/19 136/67     Wt Readings from Last 3 Encounters:   04/15/19 105 kg (231 lb)   04/09/19 105 kg (231 lb)   02/26/19 103 kg (226 lb 12.8 oz)      Body mass index is 27.39 kg/m².    CC:  Main reason(s) for today's visit: Insomnia ( x long time may be due to medication)      HPI:     RLS: uncontrolled on Mirapex 1.5 at bedtime. Interferes with sleep. Bothers him only at night. Takes Paxil for depression at bedtime. Has history of persistent mild anemia. No CKD.     On warfarin for APS, INR today at home was 2.3. Wants to start checking INR here.     Patient Care Team:  Brandin Bolton MD as PCP - General (Internal Medicine)  Tapan, George THORPE II, MD as Consulting Physician (Hematology and Oncology)  Jose Inman MD as Consulting Physician (Urology)  Mulugeta Millan MD as Consulting Physician (Gastroenterology)  Seth Randhawa MD as Consulting Physician " (Ophthalmology)  ____________________________________________________________________    REVIEW OF SYSTEMS    Review of Systems  Constitutional neg  Resp neg  CV neg   GI neg for melena or bleeding; history of esoph cancer   Heme no easy bruising or bleeding     PHYSICAL EXAMINATION    Physical Exam  Alert  Normal thought and judgment     REVIEWED DATA:    Labs:     INR at home was 2.3 today (on 5 mg daily, except 2.5 mg 3 days of week)    Lab Results   Component Value Date     09/04/2018    K 4.2 09/04/2018    CALCIUM 9.2 09/04/2018    AST 23 09/04/2018    ALT 18 09/04/2018    BUN 30 (H) 09/04/2018    CREATININE 1.14 09/04/2018    CREATININE 0.93 05/10/2018    CREATININE 1.16 03/15/2018    EGFRIFNONA 64 09/04/2018    EGFRIFAFRI 97 05/10/2018       Lab Results   Component Value Date    GLUCOSE 66 09/04/2018    GLUCOSE 108 (H) 03/15/2018    GLUCOSE 103 03/08/2018       Lab Results   Component Value Date    LDL 92 05/10/2018     (H) 01/30/2018    LDL 98 01/26/2017    HDL 45 05/10/2018    HDL 55 01/30/2018    HDL 67 (H) 01/26/2017    TRIG 79 05/10/2018    TRIG 74 01/30/2018    TRIG 47 01/26/2017       Lab Results   Component Value Date    TSH 3.440 02/13/2019    FREET4 1.33 02/13/2019       Lab Results   Component Value Date    WBC 4.13 02/19/2019    HGB 11.1 (L) 02/19/2019    HGB 11.2 (L) 02/13/2019    HGB 10.9 (L) 09/04/2018     02/19/2019       Lab Results   Component Value Date    GLUCOSEU Negative 03/15/2018    BLOODU Negative 03/15/2018    NITRITEU Negative 03/15/2018    LEUKOCYTESUR Negative 03/15/2018        Imaging:         Medical Tests:         Summary of old records / correspondence / consultant report:         Request outside records:         ALLERGIES  No Known Allergies     PFSH:     The following portions of the patient's history were reviewed and updated as appropriate: Allergies / Current Medications / Past Medical History / Surgical History / Social History / Family  History    PROBLEM LIST   Patient Active Problem List   Diagnosis   • Adenocarcinoma of esophagus (CMS/HCC)   • Adenomatous polyp of colon   • Malignant neoplasm of prostate (CMS/HCC)   • RLS (restless legs syndrome)   • Depression   • Hypertension   • Anti-phospholipid syndrome (CMS/HCC)   • Rectal bleeding   • Change in bowel habits       PAST MEDICAL HISTORY  Past Medical History:   Diagnosis Date   • Anemia    • Anti-phospholipid syndrome (CMS/HCC)     On lifelong anticoagulation therapy   • Arthritis     RIGHT KNEE   • Bladder disorder     LEAKAGE   ON MED  wers pads   • Community acquired pneumonia     HISTORY OF IN 2014   • Deep venous thrombosis (CMS/HCC) 2006, 2008    Left lower extremity multiple   • Depression    • Esophageal carcinoma (CMS/HCC) 12/31/2014    had chemo and radiation prior surgery   • Fatigue    • Hammer toe of left foot     had surgery   • Hemorrhoids    • HH (hiatus hernia)    • History of atrial fibrillation 2015    ONE EPISODE WHILE HOSPITALIZED   • History of kidney stones    • History of nephrolithiasis    • History of pancreatitis     PT STATES MANY YEARS AGO   • History of radiation therapy    • Hypertension    • Long-term (current) use of anticoagulants, INR goal 2.0-3.0    • Malignant neoplasm of prostate (CMS/HCC)    • Other hyperlipidemia 1/30/2018 January 30, 2018 lipid panel risk 12.8%   • Restless legs syndrome    • Squamous carcinoma     on the head   • Stasis dermatitis    • Warfarin anticoagulation        SURGICAL HISTORY  Past Surgical History:   Procedure Laterality Date   • APPENDECTOMY  1950   • BRONCHOSCOPY      (Diagnostic)   • CATARACT EXTRACTION Bilateral 2014   • COLONOSCOPY  12/15/2014    Complete / Description: EH, IH, torts, stool, follow-up colonoscopy due in 5 years.   • COLONOSCOPY N/A 6/13/2017    non-thrombosed external hemorrhoids, normal examined ileum, IH   • COLONOSCOPY N/A 2/26/2019    Procedure: COLONOSCOPY with Cold Polypectomy;  Surgeon:  Mulugeta Millan MD;  Location: [facility] ENDOSCOPY;  Service: Gastroenterology   • CYSTOSCOPY W/ LASER LITHOTRIPSY     • ENDOSCOPY N/A 6/13/2017    Procedure: ESOPHAGOGASTRODUODENOSCOPY WITH COLD BIOPSY;  Surgeon: Lake Gonzalez MD;  Location: [facility] ENDOSCOPY;  Service:    • ESOPHAGECTOMY      April 2015, stage IIB esophageal carcinoma, sub-total resection.   • ESOPHAGECTOMY      Esophagectomy Subtotal Andrea Joe Procedure   • EXCISION LESION  08/2012    Removal of Squamous Cell CA on Head   • HAMMER TOE REPAIR  09/2014    Hammertoe Operation (Each Toe), 10/2014   • HAMMER TOE REPAIR Left 10/3/2017    Procedure: Left second third and fourth distal interphalangeal joint resection with flexor tenotomy;  Surgeon: Mulugeta Lira MD;  Location: [facility] OR OSC;  Service:    • HERNIA REPAIR      incisional   • JEJUNOSTOMY      Laparoscopic   • JEJUNOSTOMY      tube removal    • KNEE SURGERY Bilateral 1967, 1973, 1981   • PATELLA SURGERY Left     removed   • PILONIDAL CYST / SINUS EXCISION     • WA TOTAL KNEE ARTHROPLASTY Right 3/26/2018    Procedure: TOTAL KNEE ARTHROPLASTY;  Surgeon: Renny Solis MD;  Location: [facility] MAIN OR;  Service: Orthopedics   • PROSTATECTOMY  2010   • PYLOROPLASTY     • SPINAL FUSION  02/1998    C 5,6   • TONSILLECTOMY  1955   • TONSILLECTOMY     • UPPER GASTROINTESTINAL ENDOSCOPY  12/15/2014    LA Grade D esophagitis, Pardo's, HH, multiple duodenal ulcers   • VENTRAL/INCISIONAL HERNIA REPAIR N/A 4/14/2016    Procedure: VENTRAL/INCISIONAL HERNIA REPAIR, open, with mesh, and component separation;  Surgeon: Darren Rivas MD;  Location: [facility] OR;  Service:        SOCIAL HISTORY  Social History     Socioeconomic History   • Marital status:      Spouse name: Lena   • Number of children: 0   • Years of education: College   • Highest education level: Not on file   Occupational History   • Occupation: Retired      Comment: Retired 2014    Tobacco Use   • Smoking status: Former Smoker     Packs/day: 2.00     Years: 3.00     Pack years: 6.00     Types: Cigarettes     Last attempt to quit: 1974     Years since quittin.3   • Smokeless tobacco: Never Used   Substance and Sexual Activity   • Alcohol use: Yes     Frequency: 2-3 times a week     Comment: 2-3 x per week   • Drug use: No   • Sexual activity: Defer   Social History Narrative    self-employed        FAMILY HISTORY  Family History   Problem Relation Age of Onset   • Cerebral aneurysm Mother         cerebral artery aneurysm ( age 56)   • Prostate cancer Brother 68   • Anxiety disorder Father    • Suicide Attempts Father          of suicide   • Cancer Father         bladder   • No Known Problems Brother    • Colon cancer Neg Hx    • Esophageal cancer Neg Hx    • Dementia Neg Hx            **Dragon Disclaimer:   Much of this encounter note is an electronic transcription/translation of spoken language to printed text. The electronic translation of spoken language may permit erroneous, or at times, nonsensical words or phrases to be inadvertently transcribed. Although I have reviewed the note for such errors, some may still exist.       Template created by Moose Bolton MD

## 2019-04-15 NOTE — ASSESSMENT & PLAN NOTE
Uncontrolled. Discontinue pramipexole and start ropinirole 0.5 mg 1 hour before bedtime and then at bedtime. Check CBC and iron studies. Try taking paroxetine in the morning instead of bedtime. Educational handout given on RLS.

## 2019-04-15 NOTE — ASSESSMENT & PLAN NOTE
Due to restless leg syndrome try taking in AM instead. Will consider trial off medication on follow-up. Discussed with patient.

## 2019-04-16 LAB
BASOPHILS # BLD AUTO: 0.02 10*3/MM3 (ref 0–0.2)
BASOPHILS NFR BLD AUTO: 0.6 % (ref 0–1.5)
EOSINOPHIL # BLD AUTO: 0.14 10*3/MM3 (ref 0–0.4)
EOSINOPHIL NFR BLD AUTO: 4 % (ref 0.3–6.2)
ERYTHROCYTE [DISTWIDTH] IN BLOOD BY AUTOMATED COUNT: 15.2 % (ref 12.3–15.4)
FERRITIN SERPL-MCNC: 29.7 NG/ML (ref 30–400)
HCT VFR BLD AUTO: 37.3 % (ref 37.5–51)
HGB BLD-MCNC: 11.2 G/DL (ref 13–17.7)
IMM GRANULOCYTES # BLD AUTO: 0.01 10*3/MM3 (ref 0–0.05)
IMM GRANULOCYTES NFR BLD AUTO: 0.3 % (ref 0–0.5)
IRON SATN MFR SERPL: 13 % (ref 20–50)
IRON SERPL-MCNC: 56 MCG/DL (ref 59–158)
LYMPHOCYTES # BLD AUTO: 0.89 10*3/MM3 (ref 0.7–3.1)
LYMPHOCYTES NFR BLD AUTO: 25.2 % (ref 19.6–45.3)
MCH RBC QN AUTO: 28.9 PG (ref 26.6–33)
MCHC RBC AUTO-ENTMCNC: 30 G/DL (ref 31.5–35.7)
MCV RBC AUTO: 96.1 FL (ref 79–97)
MONOCYTES # BLD AUTO: 0.33 10*3/MM3 (ref 0.1–0.9)
MONOCYTES NFR BLD AUTO: 9.3 % (ref 5–12)
NEUTROPHILS # BLD AUTO: 2.14 10*3/MM3 (ref 1.4–7)
NEUTROPHILS NFR BLD AUTO: 60.6 % (ref 42.7–76)
NRBC BLD AUTO-RTO: 0 /100 WBC (ref 0–0)
PLATELET # BLD AUTO: 119 10*3/MM3 (ref 140–450)
RBC # BLD AUTO: 3.88 10*6/MM3 (ref 4.14–5.8)
TIBC SERPL-MCNC: 430 MCG/DL
UIBC SERPL-MCNC: 374 MCG/DL
WBC # BLD AUTO: 3.53 10*3/MM3 (ref 3.4–10.8)

## 2019-04-16 NOTE — PROGRESS NOTES
Call patient with his test result(s) and mail the results to him if MyChart is NOT active.    Overall anemia (hemoglobin) is stable.   Iron is marginally low. If not taking any iron supplement, recommend taking Centrum with iron once daily. This may be helpful for restless leg.   Platelets are lower than before. Will recheck this on follow-up in June.

## 2019-04-17 ENCOUNTER — TELEPHONE (OUTPATIENT)
Dept: INTERNAL MEDICINE | Age: 69
End: 2019-04-17

## 2019-04-17 NOTE — TELEPHONE ENCOUNTER
----- Message from Brandin Bolton MD sent at 4/16/2019  6:09 PM EDT -----  Call patient with his test result(s) and mail the results to him if MyChart is NOT active.    Overall anemia (hemoglobin) is stable.   Iron is marginally low. If not taking any iron supplement, recommend taking Centrum with iron once daily. This may be helpful for restless leg.   Platelets are lower than before. Will recheck this on follow-up in June.

## 2019-04-18 ENCOUNTER — APPOINTMENT (OUTPATIENT)
Dept: CT IMAGING | Facility: HOSPITAL | Age: 69
End: 2019-04-18

## 2019-04-18 ENCOUNTER — APPOINTMENT (OUTPATIENT)
Dept: GENERAL RADIOLOGY | Facility: HOSPITAL | Age: 69
End: 2019-04-18

## 2019-04-18 ENCOUNTER — HOSPITAL ENCOUNTER (OUTPATIENT)
Facility: HOSPITAL | Age: 69
Setting detail: OBSERVATION
Discharge: HOME | End: 2019-04-19
Attending: EMERGENCY MEDICINE | Admitting: EMERGENCY MEDICINE

## 2019-04-18 DIAGNOSIS — R77.8 ELEVATED TROPONIN: ICD-10-CM

## 2019-04-18 DIAGNOSIS — R20.2 PARESTHESIA OF ARM: ICD-10-CM

## 2019-04-18 DIAGNOSIS — R07.9 CHEST PAIN, UNSPECIFIED TYPE: Primary | ICD-10-CM

## 2019-04-18 PROBLEM — D64.9 ANEMIA: Status: ACTIVE | Noted: 2019-04-18

## 2019-04-18 LAB
ALBUMIN SERPL-MCNC: 3.9 G/DL (ref 3.5–5.2)
ALBUMIN/GLOB SERPL: 1.1 G/DL
ALP SERPL-CCNC: 122 U/L (ref 39–117)
ALT SERPL W P-5'-P-CCNC: 19 U/L (ref 1–41)
ANION GAP SERPL CALCULATED.3IONS-SCNC: 12.7 MMOL/L
ANION GAP SERPL CALCULATED.3IONS-SCNC: 12.8 MMOL/L
APTT PPP: 39.4 SECONDS (ref 22.7–35.4)
AST SERPL-CCNC: 23 U/L (ref 1–40)
BASOPHILS # BLD AUTO: 0.02 10*3/MM3 (ref 0–0.2)
BASOPHILS NFR BLD AUTO: 0.5 % (ref 0–1.5)
BILIRUB SERPL-MCNC: 0.3 MG/DL (ref 0.2–1.2)
BUN BLD-MCNC: 22 MG/DL (ref 8–23)
BUN BLD-MCNC: 23 MG/DL (ref 8–23)
BUN/CREAT SERPL: 19.6 (ref 7–25)
BUN/CREAT SERPL: 22.1 (ref 7–25)
CALCIUM SPEC-SCNC: 8.7 MG/DL (ref 8.6–10.5)
CALCIUM SPEC-SCNC: 8.7 MG/DL (ref 8.6–10.5)
CHLORIDE SERPL-SCNC: 105 MMOL/L (ref 98–107)
CHLORIDE SERPL-SCNC: 106 MMOL/L (ref 98–107)
CO2 SERPL-SCNC: 23.2 MMOL/L (ref 22–29)
CO2 SERPL-SCNC: 24.3 MMOL/L (ref 22–29)
CREAT BLD-MCNC: 1.04 MG/DL (ref 0.76–1.27)
CREAT BLD-MCNC: 1.12 MG/DL (ref 0.76–1.27)
DEPRECATED RDW RBC AUTO: 50.5 FL (ref 37–54)
DEPRECATED RDW RBC AUTO: 50.6 FL (ref 37–54)
EOSINOPHIL # BLD AUTO: 0.21 10*3/MM3 (ref 0–0.4)
EOSINOPHIL NFR BLD AUTO: 5.1 % (ref 0.3–6.2)
ERYTHROCYTE [DISTWIDTH] IN BLOOD BY AUTOMATED COUNT: 14.9 % (ref 12.3–15.4)
ERYTHROCYTE [DISTWIDTH] IN BLOOD BY AUTOMATED COUNT: 15.1 % (ref 12.3–15.4)
GFR SERPL CREATININE-BSD FRML MDRD: 65 ML/MIN/1.73
GFR SERPL CREATININE-BSD FRML MDRD: 70 ML/MIN/1.73
GLOBULIN UR ELPH-MCNC: 3.4 GM/DL
GLUCOSE BLD-MCNC: 103 MG/DL (ref 65–99)
GLUCOSE BLD-MCNC: 104 MG/DL (ref 65–99)
HCT VFR BLD AUTO: 34.7 % (ref 37.5–51)
HCT VFR BLD AUTO: 35.7 % (ref 37.5–51)
HGB BLD-MCNC: 10.7 G/DL (ref 13–17.7)
HGB BLD-MCNC: 11.3 G/DL (ref 13–17.7)
IMM GRANULOCYTES # BLD AUTO: 0.01 10*3/MM3 (ref 0–0.05)
IMM GRANULOCYTES NFR BLD AUTO: 0.2 % (ref 0–0.5)
INR PPP: 1.87 (ref 0.9–1.1)
INR PPP: 2 (ref 0.9–1.1)
LYMPHOCYTES # BLD AUTO: 0.92 10*3/MM3 (ref 0.7–3.1)
LYMPHOCYTES NFR BLD AUTO: 22.2 % (ref 19.6–45.3)
MCH RBC QN AUTO: 28.9 PG (ref 26.6–33)
MCH RBC QN AUTO: 28.9 PG (ref 26.6–33)
MCHC RBC AUTO-ENTMCNC: 30.8 G/DL (ref 31.5–35.7)
MCHC RBC AUTO-ENTMCNC: 31.7 G/DL (ref 31.5–35.7)
MCV RBC AUTO: 91.3 FL (ref 79–97)
MCV RBC AUTO: 93.8 FL (ref 79–97)
MONOCYTES # BLD AUTO: 0.46 10*3/MM3 (ref 0.1–0.9)
MONOCYTES NFR BLD AUTO: 11.1 % (ref 5–12)
NEUTROPHILS # BLD AUTO: 2.52 10*3/MM3 (ref 1.4–7)
NEUTROPHILS NFR BLD AUTO: 60.9 % (ref 42.7–76)
NRBC BLD AUTO-RTO: 0 /100 WBC (ref 0–0)
NT-PROBNP SERPL-MCNC: 187.1 PG/ML (ref 5–900)
PLATELET # BLD AUTO: 164 10*3/MM3 (ref 140–450)
PLATELET # BLD AUTO: 177 10*3/MM3 (ref 140–450)
PMV BLD AUTO: 9.7 FL (ref 6–12)
PMV BLD AUTO: 9.8 FL (ref 6–12)
POTASSIUM BLD-SCNC: 4.3 MMOL/L (ref 3.5–5.2)
POTASSIUM BLD-SCNC: 4.4 MMOL/L (ref 3.5–5.2)
PROT SERPL-MCNC: 7.3 G/DL (ref 6–8.5)
PROTHROMBIN TIME: 21.2 SECONDS (ref 11.7–14.2)
PROTHROMBIN TIME: 22.4 SECONDS (ref 11.7–14.2)
RBC # BLD AUTO: 3.7 10*6/MM3 (ref 4.14–5.8)
RBC # BLD AUTO: 3.91 10*6/MM3 (ref 4.14–5.8)
SODIUM BLD-SCNC: 141 MMOL/L (ref 136–145)
SODIUM BLD-SCNC: 143 MMOL/L (ref 136–145)
TROPONIN T SERPL-MCNC: 0.02 NG/ML (ref 0–0.03)
TROPONIN T SERPL-MCNC: 0.03 NG/ML (ref 0–0.03)
TROPONIN T SERPL-MCNC: 0.03 NG/ML (ref 0–0.03)
WBC NRBC COR # BLD: 3.87 10*3/MM3 (ref 3.4–10.8)
WBC NRBC COR # BLD: 4.14 10*3/MM3 (ref 3.4–10.8)

## 2019-04-18 PROCEDURE — 85027 COMPLETE CBC AUTOMATED: CPT | Performed by: NURSE PRACTITIONER

## 2019-04-18 PROCEDURE — G0378 HOSPITAL OBSERVATION PER HR: HCPCS

## 2019-04-18 PROCEDURE — 85610 PROTHROMBIN TIME: CPT | Performed by: EMERGENCY MEDICINE

## 2019-04-18 PROCEDURE — 71260 CT THORAX DX C+: CPT

## 2019-04-18 PROCEDURE — 80053 COMPREHEN METABOLIC PANEL: CPT | Performed by: EMERGENCY MEDICINE

## 2019-04-18 PROCEDURE — 83880 ASSAY OF NATRIURETIC PEPTIDE: CPT | Performed by: EMERGENCY MEDICINE

## 2019-04-18 PROCEDURE — 85610 PROTHROMBIN TIME: CPT | Performed by: HOSPITALIST

## 2019-04-18 PROCEDURE — 71046 X-RAY EXAM CHEST 2 VIEWS: CPT

## 2019-04-18 PROCEDURE — 93005 ELECTROCARDIOGRAM TRACING: CPT | Performed by: EMERGENCY MEDICINE

## 2019-04-18 PROCEDURE — 84484 ASSAY OF TROPONIN QUANT: CPT | Performed by: NURSE PRACTITIONER

## 2019-04-18 PROCEDURE — 36415 COLL VENOUS BLD VENIPUNCTURE: CPT | Performed by: NURSE PRACTITIONER

## 2019-04-18 PROCEDURE — 84484 ASSAY OF TROPONIN QUANT: CPT | Performed by: EMERGENCY MEDICINE

## 2019-04-18 PROCEDURE — 70450 CT HEAD/BRAIN W/O DYE: CPT

## 2019-04-18 PROCEDURE — 93010 ELECTROCARDIOGRAM REPORT: CPT | Performed by: INTERNAL MEDICINE

## 2019-04-18 PROCEDURE — 85730 THROMBOPLASTIN TIME PARTIAL: CPT | Performed by: EMERGENCY MEDICINE

## 2019-04-18 PROCEDURE — 99285 EMERGENCY DEPT VISIT HI MDM: CPT

## 2019-04-18 PROCEDURE — 25010000002 IOPAMIDOL 61 % SOLUTION: Performed by: HOSPITALIST

## 2019-04-18 PROCEDURE — 85025 COMPLETE CBC W/AUTO DIFF WBC: CPT | Performed by: EMERGENCY MEDICINE

## 2019-04-18 RX ORDER — WARFARIN SODIUM 5 MG/1
5 TABLET ORAL DAILY
Status: DISCONTINUED | OUTPATIENT
Start: 2019-04-18 | End: 2019-04-19 | Stop reason: HOSPADM

## 2019-04-18 RX ORDER — ONDANSETRON 2 MG/ML
4 INJECTION INTRAMUSCULAR; INTRAVENOUS EVERY 6 HOURS PRN
Status: DISCONTINUED | OUTPATIENT
Start: 2019-04-18 | End: 2019-04-19 | Stop reason: SDUPTHER

## 2019-04-18 RX ORDER — ACETAMINOPHEN 325 MG/1
650 TABLET ORAL EVERY 4 HOURS PRN
Status: DISCONTINUED | OUTPATIENT
Start: 2019-04-18 | End: 2019-04-19 | Stop reason: HOSPADM

## 2019-04-18 RX ORDER — ONDANSETRON 2 MG/ML
4 INJECTION INTRAMUSCULAR; INTRAVENOUS EVERY 6 HOURS PRN
Status: DISCONTINUED | OUTPATIENT
Start: 2019-04-18 | End: 2019-04-19 | Stop reason: HOSPADM

## 2019-04-18 RX ORDER — ONDANSETRON 4 MG/1
4 TABLET, FILM COATED ORAL EVERY 6 HOURS PRN
Status: DISCONTINUED | OUTPATIENT
Start: 2019-04-18 | End: 2019-04-19 | Stop reason: HOSPADM

## 2019-04-18 RX ORDER — SODIUM CHLORIDE 0.9 % (FLUSH) 0.9 %
3-10 SYRINGE (ML) INJECTION AS NEEDED
Status: DISCONTINUED | OUTPATIENT
Start: 2019-04-18 | End: 2019-04-19 | Stop reason: HOSPADM

## 2019-04-18 RX ORDER — ROPINIROLE 0.5 MG/1
0.5 TABLET, FILM COATED ORAL NIGHTLY
Status: DISCONTINUED | OUTPATIENT
Start: 2019-04-18 | End: 2019-04-19 | Stop reason: HOSPADM

## 2019-04-18 RX ORDER — ACETAMINOPHEN 325 MG/1
650 TABLET ORAL EVERY 4 HOURS PRN
Status: DISCONTINUED | OUTPATIENT
Start: 2019-04-18 | End: 2019-04-19 | Stop reason: SDUPTHER

## 2019-04-18 RX ORDER — SODIUM CHLORIDE 0.9 % (FLUSH) 0.9 %
1-10 SYRINGE (ML) INJECTION AS NEEDED
Status: DISCONTINUED | OUTPATIENT
Start: 2019-04-18 | End: 2019-04-19 | Stop reason: HOSPADM

## 2019-04-18 RX ORDER — PAROXETINE 30 MG/1
30 TABLET, FILM COATED ORAL EVERY MORNING
Status: DISCONTINUED | OUTPATIENT
Start: 2019-04-18 | End: 2019-04-19 | Stop reason: HOSPADM

## 2019-04-18 RX ORDER — SODIUM CHLORIDE 0.9 % (FLUSH) 0.9 %
3 SYRINGE (ML) INJECTION EVERY 12 HOURS SCHEDULED
Status: DISCONTINUED | OUTPATIENT
Start: 2019-04-18 | End: 2019-04-19 | Stop reason: HOSPADM

## 2019-04-18 RX ORDER — NITROGLYCERIN 0.4 MG/1
0.4 TABLET SUBLINGUAL
Status: DISCONTINUED | OUTPATIENT
Start: 2019-04-18 | End: 2019-04-19 | Stop reason: HOSPADM

## 2019-04-18 RX ORDER — ONDANSETRON 4 MG/1
4 TABLET, FILM COATED ORAL EVERY 6 HOURS PRN
Status: DISCONTINUED | OUTPATIENT
Start: 2019-04-18 | End: 2019-04-19 | Stop reason: SDUPTHER

## 2019-04-18 RX ORDER — SODIUM CHLORIDE 0.9 % (FLUSH) 0.9 %
10 SYRINGE (ML) INJECTION AS NEEDED
Status: DISCONTINUED | OUTPATIENT
Start: 2019-04-18 | End: 2019-04-19 | Stop reason: HOSPADM

## 2019-04-18 RX ADMIN — ROPINIROLE HYDROCHLORIDE 0.5 MG: 0.5 TABLET, FILM COATED ORAL at 20:49

## 2019-04-18 RX ADMIN — IOPAMIDOL 75 ML: 612 INJECTION, SOLUTION INTRAVENOUS at 18:00

## 2019-04-18 RX ADMIN — SODIUM CHLORIDE, PRESERVATIVE FREE 3 ML: 5 INJECTION INTRAVENOUS at 20:49

## 2019-04-18 RX ADMIN — WARFARIN SODIUM 5 MG: 5 TABLET ORAL at 17:45

## 2019-04-18 RX ADMIN — Medication 3 ML: at 09:45

## 2019-04-18 RX ADMIN — PAROXETINE HYDROCHLORIDE 30 MG: 30 TABLET, FILM COATED ORAL at 12:55

## 2019-04-18 NOTE — ED NOTES
"Nursing report ED to floor  Jose Hurtado  69 y.o.  male    HPI (triage note):   Chief Complaint   Patient presents with   • Numbness     left arm that began upon waking from sleep this morning   • Shortness of Breath       Admitting doctor:   Lake Goode MD    Admitting diagnosis:   The primary encounter diagnosis was Chest pain, unspecified type. Diagnoses of Elevated troponin and Paresthesia of arm were also pertinent to this visit.    Code status:   Current Code Status     Date Active Code Status Order ID Comments User Context       4/18/2019 04:53 CPR [id number]  Camila Cummings APRN ED       Questions for Current Code Status     Question Answer Comment    Code Status CPR     Medical Interventions (Level of Support Prior to Arrest) Full           Allergies:   Patient has no known allergies.    Weight:       04/18/19 0311   Weight: 103 kg (226 lb 6.4 oz)       Most recent vitals:   Vitals:    04/18/19 0302 04/18/19 0311 04/18/19 0348 04/18/19 0447   BP:  130/74 128/68 141/75   Pulse: 105 83 80 75   Resp: 22  20 20   Temp: 98.3 °F (36.8 °C)      TempSrc: Tympanic      SpO2: 96%  95% 96%   Weight:  103 kg (226 lb 6.4 oz)     Height: 195.6 cm (77\")          Active LDAs/IV Access:   Lines, Drains & Airways    Active LDAs     Name:   Placement date:   Placement time:   Site:   Days:    Peripheral IV 04/18/19 0319 Right Antecubital   04/18/19 0319    Antecubital   less than 1                Labs (abnormal labs have a star):   Labs Reviewed   COMPREHENSIVE METABOLIC PANEL - Abnormal; Notable for the following components:       Result Value    Glucose 104 (*)     Alkaline Phosphatase 122 (*)     All other components within normal limits    Narrative:     GFR Normal >60  Chronic Kidney Disease <60  Kidney Failure <15   PROTIME-INR - Abnormal; Notable for the following components:    Protime 21.2 (*)     INR 1.87 (*)     All other components within normal limits   APTT - Abnormal; Notable for the following " components:    PTT 39.4 (*)     All other components within normal limits   CBC WITH AUTO DIFFERENTIAL - Abnormal; Notable for the following components:    RBC 3.91 (*)     Hemoglobin 11.3 (*)     Hematocrit 35.7 (*)     All other components within normal limits   BNP (IN-HOUSE) - Normal    Narrative:     Among patients with dyspnea, NT-proBNP is highly sensitive for the detection of acute congestive heart failure. In addition NT-proBNP of <300 pg/ml effectively rules out acute congestive heart failure with 99% negative predictive value.   TROPONIN (IN-HOUSE) - Normal    Narrative:     Troponin T Reference Range:  <= 0.03 ng/mL-   Negative for AMI  >0.03 ng/mL-     Abnormal for myocardial necrosis.  Clinicians would have to utilize clinical acumen, EKG, Troponin and serial changes to determine if it is an Acute Myocardial Infarction or myocardial injury due to an underlying chronic condition.    BASIC METABOLIC PANEL   CBC (NO DIFF)   TROPONIN (IN-HOUSE)   CBC AND DIFFERENTIAL    Narrative:     The following orders were created for panel order CBC & Differential.  Procedure                               Abnormality         Status                     ---------                               -----------         ------                     CBC Auto Differential[[id number]]        Abnormal            Final result                 Please view results for these tests on the individual orders.       EKG:   ECG 12 Lead   Preliminary Result   HEART RATE= 83  bpm   RR Interval= 740  ms   ID Interval= 179  ms   P Horizontal Axis= -83  deg   P Front Axis= -30  deg   QRSD Interval= 104  ms   QT Interval= 375  ms   QRS Axis= -2  deg   T Wave Axis= 26  deg   - ABNORMAL ECG -   Sinus rhythm   Multiform ventricular premature complexes   Electronically Signed By:    Date and Time of Study: 2019-04-18 03:21:08          Meds given in ED:   Medications   sodium chloride 0.9 % flush 10 mL (not administered)   sodium chloride 0.9 % flush 3 mL  (not administered)   sodium chloride 0.9 % flush 1-10 mL (not administered)   nitroglycerin (NITROSTAT) SL tablet 0.4 mg (not administered)   acetaminophen (TYLENOL) tablet 650 mg (not administered)   ondansetron (ZOFRAN) tablet 4 mg (not administered)     Or   ondansetron (ZOFRAN) injection 4 mg (not administered)       Imaging results:  Xr Chest 2 View    Result Date: 2019  No acute findings.  This report was finalized on 2019 3:44 AM by Dr. Melani Washington M.D.      Ct Head Without Contrast    Result Date: 2019  No acute intracranial process.  Radiation dose reduction techniques were utilized, including automated exposure control and exposure modulation based on body size.  This report was finalized on 2019 4:16 AM by Dr. Melani Washington M.D.        Ambulatory status:   - up w/ assist    Social issues:   Social History     Socioeconomic History   • Marital status:      Spouse name: Lnea   • Number of children: 0   • Years of education: College   • Highest education level: Not on file   Occupational History   • Occupation: Retired      Comment: Retired    Tobacco Use   • Smoking status: Former Smoker     Packs/day: 2.00     Years: 3.00     Pack years: 6.00     Types: Cigarettes     Last attempt to quit: 1974     Years since quittin.3   • Smokeless tobacco: Never Used   Substance and Sexual Activity   • Alcohol use: Yes     Frequency: 2-3 times a week     Comment: 2-3 x per week   • Drug use: No   • Sexual activity: Defer   Social History Narrative    self-employed           ColbyDaria RN  [phone]

## 2019-04-18 NOTE — H&P
HISTORY AND PHYSICAL   [facility]        Patient Identification:  Name: Jose Hurtado  Age: 69 y.o.  Sex: male  :  1950  MRN: [id number]                     Primary Care Physician: Brandin Bolton MD    Chief Complaint:  Left arm numbness and tingling with chest pain    History of Present Illness:        The patient is a 69-year-old white male with history of antiphospholipid syndrome on chronic anticoagulation, history of esophageal cancer post chemoradiation and surgery, history of hiatal hernia, history of A. fib, history of kidney stones, history of prostate cancer, hypertension and restless leg syndrome who was admitted with history of waking up with some numbness and tingling in his left arm with some tightness in his chest at rest with associated shortness of air.  He has not had any nausea or vomiting.  He has not had any cold or flu symptoms.  He has not had any fever or chills.  He has not had CT of his chest for over a year for follow-up on his esophageal cancer.  The patient was evaluated in the ER and admitted for further evaluation treatment.  Head CT was negative and chest x-ray showed no acute process.  His troponins were normal.  His chest x-ray was normal and EKG showed sinus rhythm with frequent PVCs.  The patient was admitted for further workup of his chest pain.    Past Medical History:  Past Medical History:   Diagnosis Date   • Anemia    • Anti-phospholipid syndrome (CMS/HCC)     On lifelong anticoagulation therapy   • Bladder disorder     LEAKAGE   ON MED  wers pads   • Community acquired pneumonia     HISTORY OF IN    • Deep venous thrombosis (CMS/HCC) ,     Left lower extremity multiple   • Depression    • Esophageal carcinoma (CMS/HCC) 2014    had chemo and radiation prior surgery   • Hemorrhoids    • HH (hiatus hernia)    • History of atrial fibrillation     ONE EPISODE WHILE HOSPITALIZED   • History of kidney stones    • History of  nephrolithiasis    • History of pancreatitis     PT STATES MANY YEARS AGO   • History of radiation therapy    • Hypertension    • Long-term (current) use of anticoagulants, INR goal 2.0-3.0    • Malignant neoplasm of prostate (CMS/HCC)    • Other hyperlipidemia 1/30/2018 January 30, 2018 lipid panel risk 12.8%   • Restless legs syndrome    • Squamous carcinoma     on the head     Past Surgical History:  Past Surgical History:   Procedure Laterality Date   • APPENDECTOMY  1950   • BRONCHOSCOPY      (Diagnostic)   • CATARACT EXTRACTION Bilateral 2014   • COLONOSCOPY  12/15/2014    Complete / Description: EH, IH, torts, stool, follow-up colonoscopy due in 5 years.   • COLONOSCOPY N/A 6/13/2017    non-thrombosed external hemorrhoids, normal examined ileum, IH   • COLONOSCOPY N/A 2/26/2019    Procedure: COLONOSCOPY with Cold Polypectomy;  Surgeon: Mulugeta Millan MD;  Location: [facility] ENDOSCOPY;  Service: Gastroenterology   • CYSTOSCOPY W/ LASER LITHOTRIPSY     • ENDOSCOPY N/A 6/13/2017    Procedure: ESOPHAGOGASTRODUODENOSCOPY WITH COLD BIOPSY;  Surgeon: Lake Gonzalez MD;  Location: [facility] ENDOSCOPY;  Service:    • ESOPHAGECTOMY      April 2015, stage IIB esophageal carcinoma, sub-total resection.   • ESOPHAGECTOMY      Esophagectomy Subtotal Tornillo Joe Procedure   • EXCISION LESION  08/2012    Removal of Squamous Cell CA on Head   • HAMMER TOE REPAIR  09/2014    Hammertoe Operation (Each Toe), 10/2014   • HAMMER TOE REPAIR Left 10/3/2017    Procedure: Left second third and fourth distal interphalangeal joint resection with flexor tenotomy;  Surgeon: Mulugeta Lira MD;  Location: [facility] OR [facility];  Service:    • HERNIA REPAIR      incisional   • JEJUNOSTOMY      Laparoscopic   • JEJUNOSTOMY      tube removal    • KNEE SURGERY Bilateral 1967, 1973, 1981   • PATELLA SURGERY Left     removed   • PILONIDAL CYST / SINUS EXCISION     • MI TOTAL KNEE ARTHROPLASTY Right 3/26/2018    Procedure: TOTAL KNEE  ARTHROPLASTY;  Surgeon: Renny Solis MD;  Location: [facility] OR;  Service: Orthopedics   • PROSTATECTOMY  2010   • PYLOROPLASTY     • SPINAL FUSION  02/1998    C 5,6   • TONSILLECTOMY  1955   • TONSILLECTOMY     • UPPER GASTROINTESTINAL ENDOSCOPY  12/15/2014    LA Grade D esophagitis, Pardo's, HH, multiple duodenal ulcers   • VENTRAL/INCISIONAL HERNIA REPAIR N/A 4/14/2016    Procedure: VENTRAL/INCISIONAL HERNIA REPAIR, open, with mesh, and component separation;  Surgeon: Darren Rivas MD;  Location: [facility] MAIN OR;  Service:       Home Meds:  Medications Prior to Admission   Medication Sig Dispense Refill Last Dose   • PARoxetine (PAXIL) 30 MG tablet Take 1 tablet by mouth Every Morning. 90 tablet 1    • rOPINIRole (REQUIP) 0.5 MG tablet Take 1 tablet by mouth Every Night. Take 1 hour before bedtime. 60 tablet 3    • warfarin (COUMADIN) 5 MG tablet Take 1 tablet by mouth Daily. 90 tablet 2      Current meds    Current Facility-Administered Medications:   •  acetaminophen (TYLENOL) tablet 650 mg, 650 mg, Oral, Q4H PRN, Camila Cummings APRSANAZ  •  nitroglycerin (NITROSTAT) SL tablet 0.4 mg, 0.4 mg, Sublingual, Q5 Min PRN, Camila Cummings APRN  •  ondansetron (ZOFRAN) tablet 4 mg, 4 mg, Oral, Q6H PRN **OR** ondansetron (ZOFRAN) injection 4 mg, 4 mg, Intravenous, Q6H PRN, Camila Cummings APRSANAZ  •  PARoxetine (PAXIL) tablet 30 mg, 30 mg, Oral, QAM, Fernando Fuchs MD, 30 mg at 04/18/19 1255  •  rOPINIRole (REQUIP) tablet 0.5 mg, 0.5 mg, Oral, Nightly, Fernando Fuchs MD  •  sodium chloride 0.9 % flush 1-10 mL, 1-10 mL, Intravenous, PRN, Camila Cummings APRSANAZ  •  [COMPLETED] Insert peripheral IV, , , Once **AND** sodium chloride 0.9 % flush 10 mL, 10 mL, Intravenous, PRN, Levar Barba MD  •  sodium chloride 0.9 % flush 3 mL, 3 mL, Intravenous, Q12H, Camila Cummings, FAYE, 3 mL at 04/18/19 2368  •  warfarin (COUMADIN) tablet 5 mg, 5 mg, Oral, Daily, Fernando Fuchs MD  Allergies:  No Known  Allergies  Immunizations:  Immunization History   Administered Date(s) Administered   • DTaP 2007, 2007   • Flu Mist 2015   • Flu Vaccine High Dose PF 65YR+ 2018   • Hepatitis A 2018   • Influenza TIV (IM) 10/02/2011   • Pneumococcal Conjugate 13-Valent (PCV13) 08/15/2016   • Pneumococcal, Unspecified 2013   • Td 2018   • Tdap 2008   • Zostavax 2013     Social History:   Social History     Social History Narrative    self-employed      Social History     Socioeconomic History   • Marital status:      Spouse name: Lena   • Number of children: 0   • Years of education: College   • Highest education level: Not on file   Occupational History   • Occupation: Retired      Comment: Retired    Tobacco Use   • Smoking status: Former Smoker     Packs/day: 2.00     Years: 3.00     Pack years: 6.00     Types: Cigarettes     Last attempt to quit: 1974     Years since quittin.3   • Smokeless tobacco: Never Used   Substance and Sexual Activity   • Alcohol use: Yes     Frequency: 2-3 times a week     Comment: 2-3 x per week   • Drug use: No   • Sexual activity: Defer   Social History Narrative    self-employed        Family History:  Family History   Problem Relation Age of Onset   • Cerebral aneurysm Mother         cerebral artery aneurysm ( age 56)   • Prostate cancer Brother 68   • Anxiety disorder Father    • Suicide Attempts Father          of suicide   • Cancer Father         bladder   • No Known Problems Brother    • Colon cancer Neg Hx    • Esophageal cancer Neg Hx    • Dementia Neg Hx         Review of Systems  See history of present illness and past medical history.  Patient denies headache, dizziness, syncope, falls, trauma, change in vision, change in hearing, change in taste, changes in weight, changes in appetite, focal weakness, numbness, or paresthesia.  Patient denies  palpitations,  orthopnea, PND,  "cough, sinus pressure, rhinorrhea, epistaxis, hemoptysis, nausea, vomiting, hematemesis, diarrhea, constipation or hematchezia.  Denies cold or heat intolerance, polydipsia, polyuria, polyphagia. Denies hematuria, pyuria, dysuria, hesitancy, frequency or urgency.   Denies fever, chills, sweats, night sweats.   Remainder of ROS is negative.    Objective:  tMax 24 hrs: Temp (24hrs), Av.1 °F (36.7 °C), Min:97.6 °F (36.4 °C), Max:98.6 °F (37 °C)    Vitals Ranges:   Temp:  [97.6 °F (36.4 °C)-98.6 °F (37 °C)] 97.8 °F (36.6 °C)  Heart Rate:  [] 68  Resp:  [20-22] 20  BP: (123-178)/(68-89) 123/73      Exam:  /73 (BP Location: Right arm, Patient Position: Sitting)   Pulse 68   Temp 97.8 °F (36.6 °C) (Oral)   Resp 20   Ht 195.6 cm (77\")   Wt 102 kg (224 lb 12.8 oz)   SpO2 96%   BMI 26.66 kg/m²     General Appearance:    Alert, cooperative, no distress, appears stated age   Head:    Normocephalic, without obvious abnormality, atraumatic   Eyes:    PERRL, conjunctiva/corneas clear, EOM's intact, both eyes   Ears:    Normal external ear canals, both ears   Nose:   Nares normal, septum midline, mucosa normal, no drainage    or sinus tenderness   Throat:   Lips, mucosa, and tongue normal   Neck:   Supple, symmetrical, trachea midline, no adenopathy;     thyroid:  no enlargement/tenderness/nodules; no carotid    bruit or JVD   Back:     Symmetric, no curvature, ROM normal, no CVA tenderness   Lungs:     Clear to auscultation bilaterally, respirations unlabored   Chest Wall:    No tenderness or deformity    Heart:    Regular rate and rhythm, S1 and S2 normal, no murmur, rub   or gallop   Abdomen:     Soft, non-tender, bowel sounds active all four quadrants,     no masses, no hepatomegaly, no splenomegaly   Extremities:   Extremities normal, atraumatic, no cyanosis or edema   Pulses:   2+ and symmetric all extremities   Skin:   Skin color, texture, turgor normal, no rashes or lesions   Lymph nodes:   Cervical, " supraclavicular, and axillary nodes normal   Neurologic:   CNII-XII intact, normal strength, sensation intact throughout      .    Data Review:  Lab Results (last 72 hours)     Procedure Component Value Units Date/Time    Troponin [[id number]] Collected:  04/18/19 1525    Specimen:  Blood Updated:  04/18/19 1532    Protime-INR [[id number]]  (Abnormal) Collected:  04/18/19 1051    Specimen:  Blood Updated:  04/18/19 1125     Protime 22.4 Seconds      INR 2.00    Troponin [[id number]]  (Normal) Collected:  04/18/19 0923    Specimen:  Blood Updated:  04/18/19 1007     Troponin T 0.025 ng/mL     Narrative:       Troponin T Reference Range:  <= 0.03 ng/mL-   Negative for AMI  >0.03 ng/mL-     Abnormal for myocardial necrosis.  Clinicians would have to utilize clinical acumen, EKG, Troponin and serial changes to determine if it is an Acute Myocardial Infarction or myocardial injury due to an underlying chronic condition.     Basic Metabolic Panel [[id number]]  (Abnormal) Collected:  04/18/19 0614    Specimen:  Blood Updated:  04/18/19 0731     Glucose 103 mg/dL      BUN 23 mg/dL      Creatinine 1.04 mg/dL      Sodium 141 mmol/L      Potassium 4.3 mmol/L      Chloride 105 mmol/L      CO2 23.2 mmol/L      Calcium 8.7 mg/dL      eGFR Non African Amer 70 mL/min/1.73      BUN/Creatinine Ratio 22.1     Anion Gap 12.8 mmol/L     Narrative:       GFR Normal >60  Chronic Kidney Disease <60  Kidney Failure <15    CBC (No Diff) [[id number]]  (Abnormal) Collected:  04/18/19 0614    Specimen:  Blood Updated:  04/18/19 0706     WBC 3.87 10*3/mm3      RBC 3.70 10*6/mm3      Hemoglobin 10.7 g/dL      Hematocrit 34.7 %      MCV 93.8 fL      MCH 28.9 pg      MCHC 30.8 g/dL      RDW 14.9 %      RDW-SD 50.6 fl      MPV 9.8 fL      Platelets 164 10*3/mm3     Protime-INR [[id number]]  (Abnormal) Collected:  04/18/19 0324    Specimen:  Blood Updated:  04/18/19 0359     Protime 21.2 Seconds      INR 1.87    aPTT [[id number]]  (Abnormal) Collected:   04/18/19 0324    Specimen:  Blood Updated:  04/18/19 0359     PTT 39.4 seconds     Comprehensive Metabolic Panel [[id number]]  (Abnormal) Collected:  04/18/19 0324    Specimen:  Blood Updated:  04/18/19 0359     Glucose 104 mg/dL      BUN 22 mg/dL      Creatinine 1.12 mg/dL      Sodium 143 mmol/L      Potassium 4.4 mmol/L      Chloride 106 mmol/L      CO2 24.3 mmol/L      Calcium 8.7 mg/dL      Total Protein 7.3 g/dL      Albumin 3.90 g/dL      ALT (SGPT) 19 U/L      AST (SGOT) 23 U/L      Alkaline Phosphatase 122 U/L      Total Bilirubin 0.3 mg/dL      eGFR Non African Amer 65 mL/min/1.73      Globulin 3.4 gm/dL      A/G Ratio 1.1 g/dL      BUN/Creatinine Ratio 19.6     Anion Gap 12.7 mmol/L     Narrative:       GFR Normal >60  Chronic Kidney Disease <60  Kidney Failure <15    Troponin [[id number]]  (Normal) Collected:  04/18/19 0324    Specimen:  Blood Updated:  04/18/19 0359     Troponin T 0.029 ng/mL     Narrative:       Troponin T Reference Range:  <= 0.03 ng/mL-   Negative for AMI  >0.03 ng/mL-     Abnormal for myocardial necrosis.  Clinicians would have to utilize clinical acumen, EKG, Troponin and serial changes to determine if it is an Acute Myocardial Infarction or myocardial injury due to an underlying chronic condition.     BNP [[id number]]  (Normal) Collected:  04/18/19 0324    Specimen:  Blood Updated:  04/18/19 0358     proBNP 187.1 pg/mL     Narrative:       Among patients with dyspnea, NT-proBNP is highly sensitive for the detection of acute congestive heart failure. In addition NT-proBNP of <300 pg/ml effectively rules out acute congestive heart failure with 99% negative predictive value.    CBC & Differential [[id number]] Collected:  04/18/19 0324    Specimen:  Blood Updated:  04/18/19 0344    Narrative:       The following orders were created for panel order CBC & Differential.  Procedure                               Abnormality         Status                     ---------                                -----------         ------                     CBC Auto Differential[[id number]]        Abnormal            Final result                 Please view results for these tests on the individual orders.    CBC Auto Differential [[id number]]  (Abnormal) Collected:  04/18/19 0324    Specimen:  Blood Updated:  04/18/19 0344     WBC 4.14 10*3/mm3      RBC 3.91 10*6/mm3      Hemoglobin 11.3 g/dL      Hematocrit 35.7 %      MCV 91.3 fL      MCH 28.9 pg      MCHC 31.7 g/dL      RDW 15.1 %      RDW-SD 50.5 fl      MPV 9.7 fL      Platelets 177 10*3/mm3      Neutrophil % 60.9 %      Lymphocyte % 22.2 %      Monocyte % 11.1 %      Eosinophil % 5.1 %      Basophil % 0.5 %      Immature Grans % 0.2 %      Neutrophils, Absolute 2.52 10*3/mm3      Lymphocytes, Absolute 0.92 10*3/mm3      Monocytes, Absolute 0.46 10*3/mm3      Eosinophils, Absolute 0.21 10*3/mm3      Basophils, Absolute 0.02 10*3/mm3      Immature Grans, Absolute 0.01 10*3/mm3      nRBC 0.0 /100 WBC                    Imaging Results (all)     Procedure Component Value Units Date/Time    CT Head Without Contrast [[id number]] Collected:  04/18/19 0413     Updated:  04/18/19 0419    Narrative:       CT OF THE HEAD WITHOUT CONTRAST     HISTORY: Tingling and numbness in the left arm     COMPARISON: None available.     TECHNIQUE: Axial CT imaging was obtained from vertex of the skull to the  skull base. No IV contrast was administered. .     FINDINGS:  No acute intracranial hemorrhage is seen. There is mild atrophy, in  keeping with the age of 69. There is no midline shift or mass effect. No  focal areas of decreased attenuation are seen. There is some mucosal  thickening identified within the ethmoid sinuses and mastoid air cells  appear clear. No focal soft tissue abnormalities are seen       Impression:       No acute intracranial process.     Radiation dose reduction techniques were utilized, including automated  exposure control and exposure modulation based on body  size.     This report was finalized on 4/18/2019 4:16 AM by Dr. Melani Washington M.D.       XR Chest 2 View [[id number]] Collected:  04/18/19 0341     Updated:  04/18/19 0347    Narrative:       PA AND LATERAL CHEST RADIOGRAPH     HISTORY: Shortness of air     COMPARISON: 01/11/2019     FINDINGS:  Cardiomediastinal silhouette is stable. Chronic scarring is identified  within the right hemithorax. This appears stable when compared to  January 2019. No acute infiltrates are identified. There is no  pneumothorax.       Impression:       No acute findings.     This report was finalized on 4/18/2019 3:44 AM by Dr. Melani Washington M.D.           Past Medical History:   Diagnosis Date   • Anemia    • Anti-phospholipid syndrome (CMS/HCC)     On lifelong anticoagulation therapy   • Bladder disorder     LEAKAGE   ON MED  wers pads   • Community acquired pneumonia     HISTORY OF IN 2014   • Deep venous thrombosis (CMS/HCC) 2006, 2008    Left lower extremity multiple   • Depression    • Esophageal carcinoma (CMS/HCC) 12/31/2014    had chemo and radiation prior surgery   • Hemorrhoids    • HH (hiatus hernia)    • History of atrial fibrillation 2015    ONE EPISODE WHILE HOSPITALIZED   • History of kidney stones    • History of nephrolithiasis    • History of pancreatitis     PT STATES MANY YEARS AGO   • History of radiation therapy    • Hypertension    • Long-term (current) use of anticoagulants, INR goal 2.0-3.0    • Malignant neoplasm of prostate (CMS/HCC)    • Other hyperlipidemia 1/30/2018 January 30, 2018 lipid panel risk 12.8%   • Restless legs syndrome    • Squamous carcinoma     on the head       Assessment:  Active Hospital Problems    Diagnosis  POA   • **Chest pain [R07.9]  Yes   • Anemia [D64.9]  Unknown   • Anti-phospholipid syndrome (CMS/HCC) [D68.61]  Yes   • Hypertension [I10]  Yes   • Depression [F32.9]  Yes   • Adenocarcinoma of esophagus (CMS/HCC) [C15.9]  Yes   • RLS (restless legs syndrome) [G25.81]   Yes      Resolved Hospital Problems   No resolved problems to display.       Plan:  The patient admitted to the hospital we will check serial cardiac enzymes to rule out MI.  We will get CT scan of chest to evaluate further and also check echocardiogram.  If his enzymes remain negative and chest CT does not show any significant pathology will ask for cardiology to see in schedule for stress test tomorrow.    Fernando Fuchs MD  4/18/2019  3:36 PM

## 2019-04-18 NOTE — PLAN OF CARE
"Problem: Patient Care Overview  Goal: Plan of Care Review  Outcome: Ongoing (interventions implemented as appropriate)   04/18/19 0686   Coping/Psychosocial   Plan of Care Reviewed With patient;spouse   Plan of Care Review   Progress no change   OTHER   Outcome Summary Patient admitted through ED with chest tightness, that the patient describes as more \"shortness of air\" than pain. Serial troponins. Occasional PVC. Chronic Coumadin use for HX fo DVT LE. Patient states he has JAI. Patient has not been wearing a cpap yet.       Problem: Cardiac: ACS (Acute Coronary Syndrome) (Adult)  Goal: Signs and Symptoms of Listed Potential Problems Will be Absent, Minimized or Managed (Cardiac: ACS)  Outcome: Ongoing (interventions implemented as appropriate)      Problem: Fall Risk (Adult)  Goal: Identify Related Risk Factors and Signs and Symptoms  Outcome: Ongoing (interventions implemented as appropriate)    Goal: Absence of Fall  Outcome: Ongoing (interventions implemented as appropriate)        "

## 2019-04-18 NOTE — ED PROVIDER NOTES
EMERGENCY DEPARTMENT ENCOUNTER    CHIEF COMPLAINT  Chief Complaint: Numbness  History given by: patient   History limited by: n/a   Room Number: S512/1  PMD: Brandin Bolton MD      HPI:  Pt is a 69 y.o. male who presents complaining of left arm numbness/tingling that was present upon waking from sleep tonight. Pt also reports associated chest tightness and SOA. He denies increased BLE edema, focal weakness, or any other sx. Pt states that he is on Coumadin s/t a hx of DVT    Duration:  Prior to arrival  Onset: present upon waking   Timing: constant   Location: left arm  Radiation: none  Quality: numbness/weakness   Intensity/Severity: moderate  Progression: unchanged   Associated Symptoms: CP, SOA  Aggravating Factors: none  Alleviating Factors: none    PAST MEDICAL HISTORY  Active Ambulatory Problems     Diagnosis Date Noted   • Adenocarcinoma of esophagus (CMS/HCC) 02/11/2016   • Adenomatous polyp of colon 02/11/2016   • Malignant neoplasm of prostate (CMS/HCC) 02/11/2016   • RLS (restless legs syndrome) 02/11/2016   • Depression 01/26/2017   • Hypertension 04/12/2018   • Anti-phospholipid syndrome (CMS/HCC) 05/10/2018   • Rectal bleeding 02/15/2019   • Change in bowel habits 02/15/2019     Resolved Ambulatory Problems     Diagnosis Date Noted   • Dysphagia 02/11/2016   • Duodenal ulcer 02/11/2016   • Decreased body weight 02/11/2016   • Cough    • Hyperkalemia 02/15/2016   • History of deep vein thrombosis 03/22/2016   • DVT (deep venous thrombosis) (CMS/HCC) 08/24/2017   • Other hyperlipidemia 01/30/2018     Past Medical History:   Diagnosis Date   • Anemia    • Anti-phospholipid syndrome (CMS/HCC)    • Bladder disorder    • Community acquired pneumonia    • Deep venous thrombosis (CMS/HCC) 2006, 2008   • Depression    • Esophageal carcinoma (CMS/HCC) 12/31/2014   • Hemorrhoids    • HH (hiatus hernia)    • History of atrial fibrillation 2015   • History of kidney stones    • History of nephrolithiasis    •  History of pancreatitis    • History of radiation therapy    • Hypertension    • Long-term (current) use of anticoagulants, INR goal 2.0-3.0    • Malignant neoplasm of prostate (CMS/HCC)    • Other hyperlipidemia 1/30/2018   • Restless legs syndrome    • Squamous carcinoma        PAST SURGICAL HISTORY  Past Surgical History:   Procedure Laterality Date   • APPENDECTOMY  1950   • BRONCHOSCOPY      (Diagnostic)   • CATARACT EXTRACTION Bilateral 2014   • COLONOSCOPY  12/15/2014    Complete / Description: EH, IH, torts, stool, follow-up colonoscopy due in 5 years.   • COLONOSCOPY N/A 6/13/2017    non-thrombosed external hemorrhoids, normal examined ileum, IH   • COLONOSCOPY N/A 2/26/2019    Procedure: COLONOSCOPY with Cold Polypectomy;  Surgeon: Mulugeta Millan MD;  Location: [facility];  Service: Gastroenterology   • CYSTOSCOPY W/ LASER LITHOTRIPSY     • ENDOSCOPY N/A 6/13/2017    Procedure: ESOPHAGOGASTRODUODENOSCOPY WITH COLD BIOPSY;  Surgeon: Lake Gonzalez MD;  Location: [facility];  Service:    • ESOPHAGECTOMY      April 2015, stage IIB esophageal carcinoma, sub-total resection.   • ESOPHAGECTOMY      Esophagectomy Subtotal Andrea Joe Procedure   • EXCISION LESION  08/2012    Removal of Squamous Cell CA on Head   • HAMMER TOE REPAIR  09/2014    Hammertoe Operation (Each Toe), 10/2014   • HAMMER TOE REPAIR Left 10/3/2017    Procedure: Left second third and fourth distal interphalangeal joint resection with flexor tenotomy;  Surgeon: Mulugeta Lira MD;  Location: [facility];  Service:    • HERNIA REPAIR      incisional   • JEJUNOSTOMY      Laparoscopic   • JEJUNOSTOMY      tube removal    • KNEE SURGERY Bilateral 1967, 1973, 1981   • PATELLA SURGERY Left     removed   • PILONIDAL CYST / SINUS EXCISION     • MD TOTAL KNEE ARTHROPLASTY Right 3/26/2018    Procedure: TOTAL KNEE ARTHROPLASTY;  Surgeon: Renny Solis MD;  Location: [facility] OR;  Service: Orthopedics   • PROSTATECTOMY      • PYLOROPLASTY     • SPINAL FUSION  1998    C 5,6   • TONSILLECTOMY     • TONSILLECTOMY     • UPPER GASTROINTESTINAL ENDOSCOPY  12/15/2014    LA Grade D esophagitis, Pardo's, HH, multiple duodenal ulcers   • VENTRAL/INCISIONAL HERNIA REPAIR N/A 2016    Procedure: VENTRAL/INCISIONAL HERNIA REPAIR, open, with mesh, and component separation;  Surgeon: Darren Rivas MD;  Location: [facility] OR;  Service:        FAMILY HISTORY  Family History   Problem Relation Age of Onset   • Cerebral aneurysm Mother         cerebral artery aneurysm ( age 56)   • Prostate cancer Brother 68   • Anxiety disorder Father    • Suicide Attempts Father          of suicide   • Cancer Father         bladder   • No Known Problems Brother    • Colon cancer Neg Hx    • Esophageal cancer Neg Hx    • Dementia Neg Hx        SOCIAL HISTORY  Social History     Socioeconomic History   • Marital status:      Spouse name: Lena   • Number of children: 0   • Years of education: College   • Highest education level: Not on file   Occupational History   • Occupation: Retired      Comment: Retired    Tobacco Use   • Smoking status: Former Smoker     Packs/day: 2.00     Years: 3.00     Pack years: 6.00     Types: Cigarettes     Last attempt to quit: 1974     Years since quittin.3   • Smokeless tobacco: Never Used   Substance and Sexual Activity   • Alcohol use: Yes     Frequency: 2-3 times a week     Comment: 2-3 x per week   • Drug use: No   • Sexual activity: Defer   Social History Narrative    self-employed        ALLERGIES  Patient has no known allergies.    REVIEW OF SYSTEMS  Review of Systems   Constitutional: Negative for activity change, appetite change and fever.   HENT: Negative for congestion and sore throat.    Eyes: Negative.    Respiratory: Positive for shortness of breath. Negative for cough.    Cardiovascular: Positive for chest pain. Negative for leg  swelling.   Gastrointestinal: Negative for abdominal pain, diarrhea and vomiting.   Endocrine: Negative.    Genitourinary: Negative for decreased urine volume and dysuria.   Musculoskeletal: Negative for neck pain.   Skin: Negative for rash and wound.   Allergic/Immunologic: Negative.    Neurological: Positive for numbness (LUE). Negative for weakness and headaches.   Hematological: Negative.    Psychiatric/Behavioral: Negative.    All other systems reviewed and are negative.      PHYSICAL EXAM  ED Triage Vitals   Temp Heart Rate Resp BP SpO2   04/18/19 0302 04/18/19 0302 04/18/19 0302 04/18/19 0311 04/18/19 0302   98.3 °F (36.8 °C) 105 22 130/74 96 %      Temp src Heart Rate Source Patient Position BP Location FiO2 (%)   04/18/19 0302 04/18/19 0302 -- -- --   Tympanic Monitor          Physical Exam   Constitutional: He is oriented to person, place, and time. No distress.   HENT:   Head: Normocephalic and atraumatic.   Eyes: EOM are normal. Pupils are equal, round, and reactive to light.   Neck: Normal range of motion. Neck supple.   Cardiovascular: Normal rate, regular rhythm and normal heart sounds.   Pulmonary/Chest: Effort normal and breath sounds normal. No respiratory distress.   Abdominal: Soft. There is no tenderness. There is no rebound and no guarding.   Musculoskeletal: Normal range of motion. He exhibits no edema.   Neurological: He is alert and oriented to person, place, and time. He has normal sensation and normal strength.   Skin: Skin is warm and dry.   Psychiatric: Mood and affect normal.   Nursing note and vitals reviewed.      LAB RESULTS  Lab Results (last 24 hours)     Procedure Component Value Units Date/Time    CBC & Differential [[id number]] Collected:  04/18/19 0324    Specimen:  Blood Updated:  04/18/19 0344    Narrative:       The following orders were created for panel order CBC & Differential.  Procedure                               Abnormality         Status                      ---------                               -----------         ------                     CBC Auto Differential[[id number]]        Abnormal            Final result                 Please view results for these tests on the individual orders.    Comprehensive Metabolic Panel [[id number]]  (Abnormal) Collected:  04/18/19 0324    Specimen:  Blood Updated:  04/18/19 0359     Glucose 104 mg/dL      BUN 22 mg/dL      Creatinine 1.12 mg/dL      Sodium 143 mmol/L      Potassium 4.4 mmol/L      Chloride 106 mmol/L      CO2 24.3 mmol/L      Calcium 8.7 mg/dL      Total Protein 7.3 g/dL      Albumin 3.90 g/dL      ALT (SGPT) 19 U/L      AST (SGOT) 23 U/L      Alkaline Phosphatase 122 U/L      Total Bilirubin 0.3 mg/dL      eGFR Non African Amer 65 mL/min/1.73      Globulin 3.4 gm/dL      A/G Ratio 1.1 g/dL      BUN/Creatinine Ratio 19.6     Anion Gap 12.7 mmol/L     Narrative:       GFR Normal >60  Chronic Kidney Disease <60  Kidney Failure <15    Protime-INR [[id number]]  (Abnormal) Collected:  04/18/19 0324    Specimen:  Blood Updated:  04/18/19 0359     Protime 21.2 Seconds      INR 1.87    aPTT [[id number]]  (Abnormal) Collected:  04/18/19 0324    Specimen:  Blood Updated:  04/18/19 0359     PTT 39.4 seconds     BNP [[id number]]  (Normal) Collected:  04/18/19 0324    Specimen:  Blood Updated:  04/18/19 0358     proBNP 187.1 pg/mL     Narrative:       Among patients with dyspnea, NT-proBNP is highly sensitive for the detection of acute congestive heart failure. In addition NT-proBNP of <300 pg/ml effectively rules out acute congestive heart failure with 99% negative predictive value.    Troponin [[id number]]  (Normal) Collected:  04/18/19 0324    Specimen:  Blood Updated:  04/18/19 0359     Troponin T 0.029 ng/mL     Narrative:       Troponin T Reference Range:  <= 0.03 ng/mL-   Negative for AMI  >0.03 ng/mL-     Abnormal for myocardial necrosis.  Clinicians would have to utilize clinical acumen, EKG, Troponin and serial changes  to determine if it is an Acute Myocardial Infarction or myocardial injury due to an underlying chronic condition.     CBC Auto Differential [[id number]]  (Abnormal) Collected:  04/18/19 0324    Specimen:  Blood Updated:  04/18/19 0344     WBC 4.14 10*3/mm3      RBC 3.91 10*6/mm3      Hemoglobin 11.3 g/dL      Hematocrit 35.7 %      MCV 91.3 fL      MCH 28.9 pg      MCHC 31.7 g/dL      RDW 15.1 %      RDW-SD 50.5 fl      MPV 9.7 fL      Platelets 177 10*3/mm3      Neutrophil % 60.9 %      Lymphocyte % 22.2 %      Monocyte % 11.1 %      Eosinophil % 5.1 %      Basophil % 0.5 %      Immature Grans % 0.2 %      Neutrophils, Absolute 2.52 10*3/mm3      Lymphocytes, Absolute 0.92 10*3/mm3      Monocytes, Absolute 0.46 10*3/mm3      Eosinophils, Absolute 0.21 10*3/mm3      Basophils, Absolute 0.02 10*3/mm3      Immature Grans, Absolute 0.01 10*3/mm3      nRBC 0.0 /100 WBC     Basic Metabolic Panel [[id number]] Collected:  04/18/19 0614    Specimen:  Blood Updated:  04/18/19 0656    CBC (No Diff) [[id number]] Collected:  04/18/19 0614    Specimen:  Blood Updated:  04/18/19 0655          I ordered the above labs and reviewed the results    RADIOLOGY  CT Head Without Contrast   Final Result   No acute intracranial process.       Radiation dose reduction techniques were utilized, including automated   exposure control and exposure modulation based on body size.       This report was finalized on 4/18/2019 4:16 AM by Dr. Melani Washington M.D.          XR Chest 2 View   Final Result   No acute findings.       This report was finalized on 4/18/2019 3:44 AM by Dr. Melani Washington M.D.               I ordered the above noted radiological studies. Interpreted by radiologist.. Reviewed by me in PACS.       PROCEDURES  Procedures    EKG          EKG time: 03:21  Rhythm/Rate: NSR 83  PVC's  P waves and DE: nml  QRS, axis: nml   ST and T waves: nml     Interpreted Contemporaneously by me, independently viewed  Unchanged compared to  prior 9/27/17    PROGRESS AND CONSULTS       03:04  CXR, CMP, CBC, BNP, troponin, PT/INR, APTT, and EKG ordered.     03:16  BP- 130/74 HR- 83 Temp- 98.3 °F (36.8 °C) (Tympanic) O2 sat- 96%  Informed pt of the plan for labs, CXR, CT head, and EKG. Pt understands and agrees with the plan, all questions answered.    03:44  CT head ordered.     04:28  BP- 128/68 HR- 80 Temp- 98.3 °F (36.8 °C) (Tympanic) O2 sat- 95%  Rechecked the patient who is in NAD and is resting comfortably. Informed pt that he CT head shows NAD. Informed pt that his troponin is WNL, but not at 0. Informed pt of the plan for admission for serial troponins. Pt understands and agrees with the plan, all questions answered.    04:40  Call placed to A for admission.    04:50  Discussed pt's case with FAYE Falk ([facility]), who agrees to admit the pt    MEDICAL DECISION MAKING  Results were reviewed/discussed with the patient and they were also made aware of online access. Pt also made aware that some labs, such as cultures, will not be resulted during ER visit and follow up with PMD is necessary.     MDM  Number of Diagnoses or Management Options  Chest pain, unspecified type:   Elevated troponin:   Paresthesia of arm:      Amount and/or Complexity of Data Reviewed  Clinical lab tests: ordered and reviewed (Troponin=0.029)  Tests in the radiology section of CPT®: ordered and reviewed (CT head shows NAD. CXR shows NAD)  Tests in the medicine section of CPT®: ordered and reviewed (See EKG procedure note. )  Discuss the patient with other providers: yes (FAYE Falk ([facility]))    Patient Progress  Patient progress: stable         DIAGNOSIS  Final diagnoses:   Chest pain, unspecified type   Elevated troponin   Paresthesia of arm       DISPOSITION  ADMISSION    Discussed treatment plan and reason for admission with pt/family and admitting physician.  Pt/family voiced understanding of the plan for admission for further testing/treatment as needed.      Latest Documented Vital Signs:  As of 6:58 AM  BP- 141/75 HR- 75 Temp- 97.6 °F (36.4 °C) (Oral) O2 sat- 96%    --  Documentation assistance provided by eunice Hogan for Dr Barba.  Information recorded by the scribe was done at my direction and has been verified and validated by me.       Dotty Hogan  04/18/19 2285       Levar Barba MD  04/18/19 4782

## 2019-04-19 ENCOUNTER — APPOINTMENT (OUTPATIENT)
Dept: NUCLEAR MEDICINE | Facility: HOSPITAL | Age: 69
End: 2019-04-19

## 2019-04-19 ENCOUNTER — APPOINTMENT (OUTPATIENT)
Dept: CARDIOLOGY | Facility: HOSPITAL | Age: 69
End: 2019-04-19

## 2019-04-19 VITALS
TEMPERATURE: 98.1 F | HEIGHT: 77 IN | BODY MASS INDEX: 26.09 KG/M2 | DIASTOLIC BLOOD PRESSURE: 75 MMHG | HEART RATE: 80 BPM | WEIGHT: 221 LBS | RESPIRATION RATE: 22 BRPM | SYSTOLIC BLOOD PRESSURE: 147 MMHG | OXYGEN SATURATION: 96 %

## 2019-04-19 LAB
ANION GAP SERPL CALCULATED.3IONS-SCNC: 12.7 MMOL/L
BASOPHILS # BLD AUTO: 0.02 10*3/MM3 (ref 0–0.2)
BASOPHILS NFR BLD AUTO: 0.6 % (ref 0–1.5)
BH CV ECHO MEAS - ACS: 2.6 CM
BH CV ECHO MEAS - AO MEAN PG (FULL): 1 MMHG
BH CV ECHO MEAS - AO MEAN PG: 4 MMHG
BH CV ECHO MEAS - AO ROOT AREA (BSA CORRECTED): 1.6
BH CV ECHO MEAS - AO ROOT AREA: 11.3 CM^2
BH CV ECHO MEAS - AO ROOT DIAM: 3.8 CM
BH CV ECHO MEAS - AO V2 MEAN: 87.2 CM/SEC
BH CV ECHO MEAS - AO V2 VTI: 28.3 CM
BH CV ECHO MEAS - AVA(I,A): 4.6 CM^2
BH CV ECHO MEAS - AVA(I,D): 4.6 CM^2
BH CV ECHO MEAS - BSA(HAYCOCK): 2.3 M^2
BH CV ECHO MEAS - BSA: 2.3 M^2
BH CV ECHO MEAS - BZI_BMI: 26.2 KILOGRAMS/M^2
BH CV ECHO MEAS - BZI_METRIC_HEIGHT: 195.6 CM
BH CV ECHO MEAS - BZI_METRIC_WEIGHT: 100.2 KG
BH CV ECHO MEAS - EDV(CUBED): 64 ML
BH CV ECHO MEAS - EDV(MOD-SP2): 124 ML
BH CV ECHO MEAS - EDV(MOD-SP4): 84 ML
BH CV ECHO MEAS - EDV(TEICH): 70 ML
BH CV ECHO MEAS - EF(CUBED): 75.6 %
BH CV ECHO MEAS - EF(MOD-BP): 54 %
BH CV ECHO MEAS - EF(MOD-SP2): 58.9 %
BH CV ECHO MEAS - EF(MOD-SP4): 52.4 %
BH CV ECHO MEAS - EF(TEICH): 68.1 %
BH CV ECHO MEAS - ESV(CUBED): 15.6 ML
BH CV ECHO MEAS - ESV(MOD-SP2): 51 ML
BH CV ECHO MEAS - ESV(MOD-SP4): 40 ML
BH CV ECHO MEAS - ESV(TEICH): 22.3 ML
BH CV ECHO MEAS - FS: 37.5 %
BH CV ECHO MEAS - IVS/LVPW: 1.1
BH CV ECHO MEAS - IVSD: 1.4 CM
BH CV ECHO MEAS - LA DIMENSION: 3.9 CM
BH CV ECHO MEAS - LA/AO: 1
BH CV ECHO MEAS - LAT PEAK E' VEL: 10 CM/SEC
BH CV ECHO MEAS - LV DIASTOLIC VOL/BSA (35-75): 36 ML/M^2
BH CV ECHO MEAS - LV MASS(C)D: 197.6 GRAMS
BH CV ECHO MEAS - LV MASS(C)DI: 84.7 GRAMS/M^2
BH CV ECHO MEAS - LV MEAN PG: 3 MMHG
BH CV ECHO MEAS - LV SYSTOLIC VOL/BSA (12-30): 17.1 ML/M^2
BH CV ECHO MEAS - LV V1 MEAN: 71.5 CM/SEC
BH CV ECHO MEAS - LV V1 VTI: 28.7 CM
BH CV ECHO MEAS - LVIDD: 4 CM
BH CV ECHO MEAS - LVIDS: 2.5 CM
BH CV ECHO MEAS - LVLD AP2: 8 CM
BH CV ECHO MEAS - LVLD AP4: 6.9 CM
BH CV ECHO MEAS - LVLS AP2: 7.3 CM
BH CV ECHO MEAS - LVLS AP4: 6.1 CM
BH CV ECHO MEAS - LVOT AREA (M): 4.5 CM^2
BH CV ECHO MEAS - LVOT AREA: 4.5 CM^2
BH CV ECHO MEAS - LVOT DIAM: 2.4 CM
BH CV ECHO MEAS - LVPWD: 1.3 CM
BH CV ECHO MEAS - MED PEAK E' VEL: 6 CM/SEC
BH CV ECHO MEAS - MV A DUR: 0.12 SEC
BH CV ECHO MEAS - MV A MAX VEL: 105 CM/SEC
BH CV ECHO MEAS - MV DEC SLOPE: 380 CM/SEC^2
BH CV ECHO MEAS - MV DEC TIME: 0.25 SEC
BH CV ECHO MEAS - MV E MAX VEL: 87.9 CM/SEC
BH CV ECHO MEAS - MV E/A: 0.84
BH CV ECHO MEAS - MV MEAN PG: 2 MMHG
BH CV ECHO MEAS - MV P1/2T MAX VEL: 88.2 CM/SEC
BH CV ECHO MEAS - MV P1/2T: 68 MSEC
BH CV ECHO MEAS - MV V2 MEAN: 70.1 CM/SEC
BH CV ECHO MEAS - MV V2 VTI: 34.3 CM
BH CV ECHO MEAS - MVA P1/2T LCG: 2.5 CM^2
BH CV ECHO MEAS - MVA(P1/2T): 3.2 CM^2
BH CV ECHO MEAS - MVA(VTI): 3.8 CM^2
BH CV ECHO MEAS - PA ACC SLOPE: 1349 CM/SEC^2
BH CV ECHO MEAS - PA ACC TIME: 0.08 SEC
BH CV ECHO MEAS - PA MAX PG (FULL): 1.6 MMHG
BH CV ECHO MEAS - PA MAX PG: 4.6 MMHG
BH CV ECHO MEAS - PA PR(ACCEL): 44.4 MMHG
BH CV ECHO MEAS - PA V2 MAX: 107 CM/SEC
BH CV ECHO MEAS - PULM A REVS DUR: 0.16 SEC
BH CV ECHO MEAS - PULM A REVS VEL: 39.4 CM/SEC
BH CV ECHO MEAS - PULM DIAS VEL: 51.1 CM/SEC
BH CV ECHO MEAS - PULM S/D: 1.7
BH CV ECHO MEAS - PULM SYS VEL: 84.9 CM/SEC
BH CV ECHO MEAS - PVA(V,A): 2.8 CM^2
BH CV ECHO MEAS - PVA(V,D): 2.8 CM^2
BH CV ECHO MEAS - QP/QS: 0.55
BH CV ECHO MEAS - RAP SYSTOLE: 3 MMHG
BH CV ECHO MEAS - RV MAX PG: 3 MMHG
BH CV ECHO MEAS - RV MEAN PG: 1 MMHG
BH CV ECHO MEAS - RV V1 MAX: 86.5 CM/SEC
BH CV ECHO MEAS - RV V1 MEAN: 50.6 CM/SEC
BH CV ECHO MEAS - RV V1 VTI: 20.7 CM
BH CV ECHO MEAS - RVOT AREA: 3.5 CM^2
BH CV ECHO MEAS - RVOT DIAM: 2.1 CM
BH CV ECHO MEAS - RVSP: 32 MMHG
BH CV ECHO MEAS - SI(AO): 137.5 ML/M^2
BH CV ECHO MEAS - SI(CUBED): 20.7 ML/M^2
BH CV ECHO MEAS - SI(LVOT): 55.6 ML/M^2
BH CV ECHO MEAS - SI(MOD-SP2): 31.3 ML/M^2
BH CV ECHO MEAS - SI(MOD-SP4): 18.9 ML/M^2
BH CV ECHO MEAS - SI(TEICH): 20.4 ML/M^2
BH CV ECHO MEAS - SV(AO): 321 ML
BH CV ECHO MEAS - SV(CUBED): 48.4 ML
BH CV ECHO MEAS - SV(LVOT): 129.8 ML
BH CV ECHO MEAS - SV(MOD-SP2): 73 ML
BH CV ECHO MEAS - SV(MOD-SP4): 44 ML
BH CV ECHO MEAS - SV(RVOT): 71.7 ML
BH CV ECHO MEAS - SV(TEICH): 47.7 ML
BH CV ECHO MEAS - TAPSE (>1.6): 2.5 CM2
BH CV ECHO MEAS - TR MAX VEL: 282 CM/SEC
BH CV ECHO MEASUREMENTS AVERAGE E/E' RATIO: 10.99
BH CV STRESS BP STAGE 1: NORMAL
BH CV STRESS BP STAGE 2: NORMAL
BH CV STRESS BP STAGE 3: NORMAL
BH CV STRESS DURATION MIN STAGE 1: 3
BH CV STRESS DURATION MIN STAGE 2: 3
BH CV STRESS DURATION MIN STAGE 3: 3
BH CV STRESS DURATION SEC STAGE 1: 0
BH CV STRESS DURATION SEC STAGE 2: 0
BH CV STRESS DURATION SEC STAGE 3: 50
BH CV STRESS GRADE STAGE 1: 0
BH CV STRESS GRADE STAGE 2: 5
BH CV STRESS GRADE STAGE 3: 8
BH CV STRESS HR STAGE 1: 97
BH CV STRESS HR STAGE 2: 111
BH CV STRESS HR STAGE 3: 127
BH CV STRESS METS STAGE 1: 2.3
BH CV STRESS METS STAGE 2: 3.5
BH CV STRESS METS STAGE 3: 4.6
BH CV STRESS PROTOCOL 1: NORMAL
BH CV STRESS RECOVERY BP: NORMAL MMHG
BH CV STRESS RECOVERY HR: 75 BPM
BH CV STRESS SPEED STAGE 1: 1.7
BH CV STRESS SPEED STAGE 2: 2.1
BH CV STRESS SPEED STAGE 3: 2.5
BH CV STRESS STAGE 1: 1
BH CV STRESS STAGE 2: 2
BH CV STRESS STAGE 3: 3
BH CV VAS BP RIGHT ARM: NORMAL MMHG
BH CV XLRA - RV BASE: 4.3 CM
BH CV XLRA - TDI S': 18 CM/SEC
BUN BLD-MCNC: 20 MG/DL (ref 8–23)
BUN/CREAT SERPL: 21.1 (ref 7–25)
CALCIUM SPEC-SCNC: 8.6 MG/DL (ref 8.6–10.5)
CHLORIDE SERPL-SCNC: 104 MMOL/L (ref 98–107)
CO2 SERPL-SCNC: 22.3 MMOL/L (ref 22–29)
CREAT BLD-MCNC: 0.95 MG/DL (ref 0.76–1.27)
DEPRECATED RDW RBC AUTO: 52 FL (ref 37–54)
EOSINOPHIL # BLD AUTO: 0.23 10*3/MM3 (ref 0–0.4)
EOSINOPHIL NFR BLD AUTO: 6.3 % (ref 0.3–6.2)
ERYTHROCYTE [DISTWIDTH] IN BLOOD BY AUTOMATED COUNT: 15.1 % (ref 12.3–15.4)
GFR SERPL CREATININE-BSD FRML MDRD: 78 ML/MIN/1.73
GLUCOSE BLD-MCNC: 88 MG/DL (ref 65–99)
HCT VFR BLD AUTO: 36.9 % (ref 37.5–51)
HGB BLD-MCNC: 11.3 G/DL (ref 13–17.7)
IMM GRANULOCYTES # BLD AUTO: 0.01 10*3/MM3 (ref 0–0.05)
IMM GRANULOCYTES NFR BLD AUTO: 0.3 % (ref 0–0.5)
INR PPP: 2.04 (ref 0.9–1.1)
LEFT ATRIUM VOLUME INDEX: 34 ML/M2
LV EF 2D ECHO EST: 54 %
LV EF NUC BP: 70 %
LYMPHOCYTES # BLD AUTO: 0.92 10*3/MM3 (ref 0.7–3.1)
LYMPHOCYTES NFR BLD AUTO: 25.3 % (ref 19.6–45.3)
MAXIMAL PREDICTED HEART RATE: 149 BPM
MAXIMAL PREDICTED HEART RATE: 149 BPM
MCH RBC QN AUTO: 28.9 PG (ref 26.6–33)
MCHC RBC AUTO-ENTMCNC: 30.6 G/DL (ref 31.5–35.7)
MCV RBC AUTO: 94.4 FL (ref 79–97)
MONOCYTES # BLD AUTO: 0.39 10*3/MM3 (ref 0.1–0.9)
MONOCYTES NFR BLD AUTO: 10.7 % (ref 5–12)
NEUTROPHILS # BLD AUTO: 2.06 10*3/MM3 (ref 1.4–7)
NEUTROPHILS NFR BLD AUTO: 56.8 % (ref 42.7–76)
NRBC BLD AUTO-RTO: 0 /100 WBC (ref 0–0)
PERCENT MAX PREDICTED HR: 85.23 %
PLATELET # BLD AUTO: 146 10*3/MM3 (ref 140–450)
PMV BLD AUTO: 9.5 FL (ref 6–12)
POTASSIUM BLD-SCNC: 4.4 MMOL/L (ref 3.5–5.2)
PROTHROMBIN TIME: 22.7 SECONDS (ref 11.7–14.2)
RBC # BLD AUTO: 3.91 10*6/MM3 (ref 4.14–5.8)
SODIUM BLD-SCNC: 139 MMOL/L (ref 136–145)
STRESS BASELINE BP: NORMAL MMHG
STRESS BASELINE HR: 70 BPM
STRESS PERCENT HR: 100 %
STRESS POST ESTIMATED WORKLOAD: 5.7 METS
STRESS POST EXERCISE DUR MIN: 9 MIN
STRESS POST EXERCISE DUR SEC: 50 SEC
STRESS POST PEAK BP: NORMAL MMHG
STRESS POST PEAK HR: 127 BPM
STRESS TARGET HR: 127 BPM
STRESS TARGET HR: 127 BPM
WBC NRBC COR # BLD: 3.63 10*3/MM3 (ref 3.4–10.8)

## 2019-04-19 PROCEDURE — G0378 HOSPITAL OBSERVATION PER HR: HCPCS

## 2019-04-19 PROCEDURE — 25010000002 PERFLUTREN (DEFINITY) 8.476 MG IN SODIUM CHLORIDE 0.9 % 10 ML INJECTION: Performed by: HOSPITALIST

## 2019-04-19 PROCEDURE — 85025 COMPLETE CBC W/AUTO DIFF WBC: CPT | Performed by: HOSPITALIST

## 2019-04-19 PROCEDURE — 85610 PROTHROMBIN TIME: CPT | Performed by: HOSPITALIST

## 2019-04-19 PROCEDURE — 80048 BASIC METABOLIC PNL TOTAL CA: CPT | Performed by: HOSPITALIST

## 2019-04-19 PROCEDURE — 0 TECHNETIUM SESTAMIBI: Performed by: HOSPITALIST

## 2019-04-19 PROCEDURE — 99204 OFFICE O/P NEW MOD 45 MIN: CPT | Performed by: INTERNAL MEDICINE

## 2019-04-19 PROCEDURE — 93306 TTE W/DOPPLER COMPLETE: CPT | Performed by: INTERNAL MEDICINE

## 2019-04-19 PROCEDURE — A9500 TC99M SESTAMIBI: HCPCS | Performed by: HOSPITALIST

## 2019-04-19 PROCEDURE — 78452 HT MUSCLE IMAGE SPECT MULT: CPT | Performed by: INTERNAL MEDICINE

## 2019-04-19 PROCEDURE — 93306 TTE W/DOPPLER COMPLETE: CPT

## 2019-04-19 PROCEDURE — 93017 CV STRESS TEST TRACING ONLY: CPT

## 2019-04-19 PROCEDURE — 93018 CV STRESS TEST I&R ONLY: CPT | Performed by: INTERNAL MEDICINE

## 2019-04-19 PROCEDURE — 93016 CV STRESS TEST SUPVJ ONLY: CPT | Performed by: INTERNAL MEDICINE

## 2019-04-19 PROCEDURE — 78452 HT MUSCLE IMAGE SPECT MULT: CPT

## 2019-04-19 PROCEDURE — 93005 ELECTROCARDIOGRAM TRACING: CPT | Performed by: INTERNAL MEDICINE

## 2019-04-19 PROCEDURE — 93010 ELECTROCARDIOGRAM REPORT: CPT | Performed by: INTERNAL MEDICINE

## 2019-04-19 RX ORDER — FERROUS SULFATE 325(65) MG
325 TABLET ORAL
Qty: 30 TABLET | Refills: 0 | Status: SHIPPED | OUTPATIENT
Start: 2019-04-19 | End: 2019-07-09 | Stop reason: SDUPTHER

## 2019-04-19 RX ADMIN — PAROXETINE HYDROCHLORIDE 30 MG: 30 TABLET, FILM COATED ORAL at 06:20

## 2019-04-19 RX ADMIN — TECHNETIUM TC 99M SESTAMIBI 1 DOSE: 1 INJECTION INTRAVENOUS at 10:45

## 2019-04-19 RX ADMIN — PERFLUTREN 3 ML: 6.52 INJECTION, SUSPENSION INTRAVENOUS at 09:00

## 2019-04-19 RX ADMIN — SODIUM CHLORIDE, PRESERVATIVE FREE 3 ML: 5 INJECTION INTRAVENOUS at 10:12

## 2019-04-19 RX ADMIN — TECHNETIUM TC 99M SESTAMIBI 1 DOSE: 1 INJECTION INTRAVENOUS at 12:15

## 2019-04-19 RX ADMIN — Medication 3 ML: at 10:12

## 2019-04-19 NOTE — PROGRESS NOTES
Continued Stay Note  [facility]     Patient Name: Jose Hurtado  MRN: [id number]  Today's Date: 4/19/2019    Admit Date: 4/18/2019    Discharge Plan     Row Name 04/19/19 1356       Plan    Plan  Plans are home.  CCP will follow.   Sara Hernandez RN/CCP        Discharge Codes    No documentation.             Sara Hernandez RN

## 2019-04-19 NOTE — CONSULTS
Patient Name: Jose Hurtado  :1950  69 y.o.    Date of Admission: 2019  Date of Consultation:  19  Encounter Provider: Jose Jorge MD  Place of Service: [facility] CARDIOLOGY  Referring Provider: Lake Goode MD  Patient Care Team:  Brandin Bolton MD as PCP - General (Internal Medicine)  Code, Georeg THORPE II, MD as Consulting Physician (Hematology and Oncology)  Jose Inman MD as Consulting Physician (Urology)  Mulugeta Millan MD as Consulting Physician (Gastroenterology)  Seth Randhawa MD as Consulting Physician (Ophthalmology)      Chief complaint: Numbing/Tingling, chest pain    History of Present Illness:  This is a 69 year old male with history of HTN, esophageal cancer s/p chemo and radiation, antiphospholipid syndrome, and DVT in  an . He was seen by Dr. Solomon in  for a follow up for lower extremity swelling. At this time he was told to continue to use elevation and compression stockings. He was to see cardiology as needed and was to remain on coumadin. His last echocardiogram was in  which found that he had an EF of 55-60%, mild LVH, and mild tricuspid regurgitation.     He came into the ER yesterday for left arm numbing and tingling sensation that began when he woke up. He also felt some chest tightness with some SOA.  CT of head and chest x ray were negative. CT of chest found small right pleural effusion with some mild atelectasis. EKG found normal sinus with frequent PVCs.     EKG this am pending. Echocardiogram pending. Stress test pending.     1st troponin of 0.029. 2nd troponin of 0.025. Third troponin of 0.017. INR of 2.04 today.     CT of chest on 19-  FINDINGS: No pathologically enlarged hilar or mediastinal lymph nodes  are seen. Patient has had a previous partial esophagectomy with gastric  pull-through procedure. There is some food material in the intrathoracic  stomach. Small partially loculated right  pleural effusion is seen with  some mild patchy atelectasis, scar or inflammatory infiltrate in the  right lower lobe. Left lung appears clear. There is a small amount of  fluid in the major fissure on the right.     No pneumothorax is seen. Deformity of the posterior right rib is seen  which may be posttraumatic or postsurgical.     IMPRESSION:  1. Small partially loculated right pleural effusion with some mild  atelectasis, scar or inflammatory infiltrate in the right lower lobe.  2. Partial gastrectomy with intrathoracic stomach appears unremarkable.    XR chest 2 vw on 4/18/19-  IMPRESSION:  No acute findings.    CT of head on 4/18/19-  IMPRESSION:  No acute intracranial process.    Previous Testing-  Echocardiogram on 4/24/15-  Conclusions  Mild concentric left ventricular hypertrophy is observed.  Global left ventricular wall motion and contractility are within normal  limits.  The estimated ejection fraction is 55-60%.   There is mild tricuspid regurgitation.  The right ventricular systolic pressure is calculated at 37 mmHg.     Past Medical History:   Diagnosis Date   • Anemia    • Anti-phospholipid syndrome (CMS/HCC)     On lifelong anticoagulation therapy   • Bladder disorder     LEAKAGE   ON MED  wers pads   • Community acquired pneumonia     HISTORY OF IN 2014   • Deep venous thrombosis (CMS/HCC) 2006, 2008    Left lower extremity multiple   • Depression    • Esophageal carcinoma (CMS/HCC) 12/31/2014    had chemo and radiation prior surgery   • Hemorrhoids    • HH (hiatus hernia)    • History of atrial fibrillation 2015    ONE EPISODE WHILE HOSPITALIZED   • History of kidney stones    • History of nephrolithiasis    • History of pancreatitis     PT STATES MANY YEARS AGO   • History of radiation therapy    • Hypertension    • Long-term (current) use of anticoagulants, INR goal 2.0-3.0    • Malignant neoplasm of prostate (CMS/HCC)    • Other hyperlipidemia 1/30/2018 January 30, 2018 lipid panel risk  12.8%   • Restless legs syndrome    • Squamous carcinoma     on the head       Past Surgical History:   Procedure Laterality Date   • APPENDECTOMY  1950   • BRONCHOSCOPY      (Diagnostic)   • CATARACT EXTRACTION Bilateral 2014   • COLONOSCOPY  12/15/2014    Complete / Description: EH, IH, torts, stool, follow-up colonoscopy due in 5 years.   • COLONOSCOPY N/A 6/13/2017    non-thrombosed external hemorrhoids, normal examined ileum, IH   • COLONOSCOPY N/A 2/26/2019    Procedure: COLONOSCOPY with Cold Polypectomy;  Surgeon: Mulugeta Millan MD;  Location: [facility] ENDOSCOPY;  Service: Gastroenterology   • CYSTOSCOPY W/ LASER LITHOTRIPSY     • ENDOSCOPY N/A 6/13/2017    Procedure: ESOPHAGOGASTRODUODENOSCOPY WITH COLD BIOPSY;  Surgeon: Lake Gonzalez MD;  Location: [facility] ENDOSCOPY;  Service:    • ESOPHAGECTOMY      April 2015, stage IIB esophageal carcinoma, sub-total resection.   • ESOPHAGECTOMY      Esophagectomy Subtotal [place] Joe Procedure   • EXCISION LESION  08/2012    Removal of Squamous Cell CA on Head   • HAMMER TOE REPAIR  09/2014    Hammertoe Operation (Each Toe), 10/2014   • HAMMER TOE REPAIR Left 10/3/2017    Procedure: Left second third and fourth distal interphalangeal joint resection with flexor tenotomy;  Surgeon: Mulugeta Lira MD;  Location: [facility] OR [facility];  Service:    • HERNIA REPAIR      incisional   • JEJUNOSTOMY      Laparoscopic   • JEJUNOSTOMY      tube removal    • KNEE SURGERY Bilateral 1967, 1973, 1981   • PATELLA SURGERY Left     removed   • PILONIDAL CYST / SINUS EXCISION     • HI TOTAL KNEE ARTHROPLASTY Right 3/26/2018    Procedure: TOTAL KNEE ARTHROPLASTY;  Surgeon: Renny Solis MD;  Location: [facility] OR;  Service: Orthopedics   • PROSTATECTOMY  2010   • PYLOROPLASTY     • SPINAL FUSION  02/1998    C 5,6   • TONSILLECTOMY  1955   • TONSILLECTOMY     • UPPER GASTROINTESTINAL ENDOSCOPY  12/15/2014    LA Grade D esophagitis, Pardo's, HH, multiple duodenal ulcers   •  VENTRAL/INCISIONAL HERNIA REPAIR N/A 2016    Procedure: VENTRAL/INCISIONAL HERNIA REPAIR, open, with mesh, and component separation;  Surgeon: Darren Rivas MD;  Location: [facility] OR;  Service:          Prior to Admission medications    Medication Sig Start Date End Date Taking? Authorizing Provider   PARoxetine (PAXIL) 30 MG tablet Take 1 tablet by mouth Every Morning. 4/15/19  Yes Brandin Bolton MD   rOPINIRole (REQUIP) 0.5 MG tablet Take 1 tablet by mouth Every Night. Take 1 hour before bedtime. 4/15/19  Yes Brandin Bolton MD   warfarin (COUMADIN) 5 MG tablet Take 1 tablet by mouth Daily. 4/15/19  Yes Brandin Bolton MD       No Known Allergies    Social History     Socioeconomic History   • Marital status:      Spouse name: Lena   • Number of children: 0   • Years of education: College   • Highest education level: Not on file   Occupational History   • Occupation: Retired      Comment: Retired    Tobacco Use   • Smoking status: Former Smoker     Packs/day: 2.00     Years: 3.00     Pack years: 6.00     Types: Cigarettes     Last attempt to quit: 1974     Years since quittin.3   • Smokeless tobacco: Never Used   Substance and Sexual Activity   • Alcohol use: Yes     Frequency: 2-3 times a week     Comment: 2-3 x per week   • Drug use: No   • Sexual activity: Defer   Social History Narrative    self-employed        Family History   Problem Relation Age of Onset   • Cerebral aneurysm Mother         cerebral artery aneurysm ( age 56)   • Prostate cancer Brother 68   • Anxiety disorder Father    • Suicide Attempts Father          of suicide   • Cancer Father         bladder   • No Known Problems Brother    • Colon cancer Neg Hx    • Esophageal cancer Neg Hx    • Dementia Neg Hx        REVIEW OF SYSTEMS:   All systems reviewed.  Pertinent positives identified in HPI.  All other systems are negative.      Objective:     Vitals:    19 0500 19  "0731 04/19/19 0913 04/19/19 1339   BP:  143/74  147/75   BP Location:  Left arm     Patient Position:  Sitting     Pulse:  80     Resp:  22     Temp:  98.3 °F (36.8 °C)  98.1 °F (36.7 °C)   TempSrc:  Oral  Oral   SpO2:  96%     Weight: 100 kg (221 lb 4.8 oz)  100 kg (221 lb)    Height:   195.6 cm (77\")      Body mass index is 26.21 kg/m².    General Appearance:    Alert, cooperative, in no acute distress   Head:    Normocephalic, without obvious abnormality, atraumatic   Eyes:            Lids and lashes normal, conjunctivae and sclerae normal, no   icterus, no pallor, corneas clear, PERRLA   Ears:    Ears appear intact with no abnormalities noted   Throat:   No oral lesions, no thrush, oral mucosa moist   Neck:   No adenopathy, supple, trachea midline, no thyromegaly, no   carotid bruit, no JVD   Back:     No kyphosis present, no scoliosis present, no skin lesions, erythema or scars, no tenderness to percussion or palpation, range of motion normal   Lungs:     Clear to auscultation,respirations regular, even and unlabored    Heart:    Regular rhythm and normal rate, normal S1 and S2, no murmur, no gallop, no rub, no click   Chest Wall:    No abnormalities observed   Abdomen:     Normal bowel sounds, no masses, no organomegaly, soft        non-tender, non-distended, no guarding, no rebound  tenderness   Extremities:   Moves all extremities well, no edema, no cyanosis, no redness   Pulses:   Pulses palpable and equal bilaterally. Normal radial, carotid, femoral, dorsalis pedis and posterior tibial pulses bilaterally. Normal abdominal aorta   Skin:  Psychiatric:   No bleeding, bruising or rash    Alert and oriented x 3, normal mood and affect   Lab Review:     Results from last 7 days   Lab Units 04/19/19  0642  04/18/19  0324   SODIUM mmol/L 139   < > 143   POTASSIUM mmol/L 4.4   < > 4.4   CHLORIDE mmol/L 104   < > 106   CO2 mmol/L 22.3   < > 24.3   BUN mg/dL 20   < > 22   CREATININE mg/dL 0.95   < > 1.12   CALCIUM " mg/dL 8.6   < > 8.7   BILIRUBIN mg/dL  --   --  0.3   ALK PHOS U/L  --   --  122*   ALT (SGPT) U/L  --   --  19   AST (SGOT) U/L  --   --  23   GLUCOSE mg/dL 88   < > 104*    < > = values in this interval not displayed.     Results from last 7 days   Lab Units 04/18/19  1525 04/18/19  0923 04/18/19  0324   TROPONIN T ng/mL 0.017 0.025 0.029     Results from last 7 days   Lab Units 04/19/19  0642   WBC 10*3/mm3 3.63   HEMOGLOBIN g/dL 11.3*   HEMATOCRIT % 36.9*   PLATELETS 10*3/mm3 146     Results from last 7 days   Lab Units 04/19/19  0642 04/18/19  1051 04/18/19  0324   INR  2.04* 2.00* 1.87*   APTT seconds  --   --  39.4*                     EKG on 4/18/19-    I personally viewed and interpreted the patient's EKG/Telemetry data.        Assessment and Plan:       69 year old man with hypercoagulability, on chronic warfarin.  Here for left arm numbness and tingling.  Troponins are borderline, but symptoms are atypical.  He will have a stress test today.    Jose Jorge MD  04/19/19  2:18 PM

## 2019-04-19 NOTE — DISCHARGE SUMMARY
PHYSICIAN DISCHARGE SUMMARY                                                                        [facility]    Patient Identification:  Name: Jose Hurtado  Age: 69 y.o.  Sex: male  :  1950  MRN: [id number]  Primary Care Physician: Brandin Bolton MD    Admit date: 2019  Discharge date and time:2019  Discharged Condition: good    Discharge Diagnoses:  Active Hospital Problems    Diagnosis  POA   • **Chest pain [R07.9]  Yes   • Anemia [D64.9]  Unknown   • Anti-phospholipid syndrome (CMS/HCC) [D68.61]  Yes   • Hypertension [I10]  Yes   • Depression [F32.9]  Yes   • Adenocarcinoma of esophagus (CMS/HCC) [C15.9]  Yes   • RLS (restless legs syndrome) [G25.81]  Yes      Resolved Hospital Problems   No resolved problems to display.          PMHX:   Past Medical History:   Diagnosis Date   • Anemia    • Anti-phospholipid syndrome (CMS/HCC)     On lifelong anticoagulation therapy   • Bladder disorder     LEAKAGE   ON MED  wers pads   • Community acquired pneumonia     HISTORY OF IN    • Deep venous thrombosis (CMS/HCC) ,     Left lower extremity multiple   • Depression    • Esophageal carcinoma (CMS/HCC) 2014    had chemo and radiation prior surgery   • Hemorrhoids    • HH (hiatus hernia)    • History of atrial fibrillation 2015    ONE EPISODE WHILE HOSPITALIZED   • History of kidney stones    • History of nephrolithiasis    • History of pancreatitis     PT STATES MANY YEARS AGO   • History of radiation therapy    • Hypertension    • Long-term (current) use of anticoagulants, INR goal 2.0-3.0    • Malignant neoplasm of prostate (CMS/HCC)    • Other hyperlipidemia 2018 lipid panel risk 12.8%   • Restless legs syndrome    • Squamous 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   • COLONOSCOPY N/A 6/13/2017    non-thrombosed external hemorrhoids, normal examined ileum, IH   • COLONOSCOPY N/A 2/26/2019    Procedure: COLONOSCOPY with Cold Polypectomy;  Surgeon: Mulugeta Millan MD;  Location: [facility] ENDOSCOPY;  Service: Gastroenterology   • CYSTOSCOPY W/ LASER LITHOTRIPSY     • ENDOSCOPY N/A 6/13/2017    Procedure: ESOPHAGOGASTRODUODENOSCOPY WITH COLD BIOPSY;  Surgeon: Lake Gonzalez MD;  Location: [facility] ENDOSCOPY;  Service:    • ESOPHAGECTOMY      April 2015, stage IIB esophageal carcinoma, sub-total resection.   • ESOPHAGECTOMY      Esophagectomy Subtotal [place] Joe Procedure   • EXCISION LESION  08/2012    Removal of Squamous Cell CA on Head   • HAMMER TOE REPAIR  09/2014    Hammertoe Operation (Each Toe), 10/2014   • HAMMER TOE REPAIR Left 10/3/2017    Procedure: Left second third and fourth distal interphalangeal joint resection with flexor tenotomy;  Surgeon: Mulugeta Lira MD;  Location: [facility] OR OSC;  Service:    • HERNIA REPAIR      incisional   • JEJUNOSTOMY      Laparoscopic   • JEJUNOSTOMY      tube removal    • KNEE SURGERY Bilateral 1967, 1973, 1981   • PATELLA SURGERY Left     removed   • PILONIDAL CYST / SINUS EXCISION     • AR TOTAL KNEE ARTHROPLASTY Right 3/26/2018    Procedure: TOTAL KNEE ARTHROPLASTY;  Surgeon: Renny Solis MD;  Location: [facility] MAIN OR;  Service: Orthopedics   • PROSTATECTOMY  2010   • PYLOROPLASTY     • SPINAL FUSION  02/1998    C 5,6   • TONSILLECTOMY  1955   • TONSILLECTOMY     • UPPER GASTROINTESTINAL ENDOSCOPY  12/15/2014    LA Grade D esophagitis, Pardo's, HH, multiple duodenal ulcers   • VENTRAL/INCISIONAL HERNIA REPAIR N/A 4/14/2016    Procedure: VENTRAL/INCISIONAL HERNIA REPAIR, open, with mesh, and component separation;  Surgeon: Darren Rivas MD;  Location:   [facility];  Service:        Hospital Course: Jose Hurtado is a 69-year-old white male with history of antiphospholipid syndrome on chronic anticoagulation, history of esophageal cancer post chemoradiation and surgery, history of hiatal hernia, history of A. fib, history of kidney stones, history of prostate cancer, hypertension and restless leg syndrome who was admitted with history of waking up with some numbness and tingling in his left arm with some tightness in his chest at rest with associated shortness of air. He has not had any nausea or vomiting. He has not had any cold or flu symptoms. He has not had any fever or chills. He has not had CT of his chest for over a year for follow-up on his esophageal cancer. The patient was evaluated in the ER and admitted for further evaluation treatment. Head CT was negative and chest x-ray showed no acute process. His troponins were normal. His chest x-ray was normal and EKG showed sinus rhythm with frequent PVCs. The patient was admitted for further workup of his chest pain.         The patient was admitted hospital chest pain and had CT chest with IV contrast which did not show any acute changes.  He was seen by cardiology and had stress test and echocardiogram which were negative.  The patient was felt to have noncardiac chest pain and his symptoms seem to resolve.  He looked well enough to go home and will follow up with his primary care doctor in 1 week for continuing care.  He will also follow-up with oncology as usual.  He is a little bit anemic and his iron levels were a little low suggested he take some iron pills and discuss his anemia further with his oncologist.  We will continue with his current other medicines for the time being.    Consults:     Consults     Date and Time Order Name Status Description    4/18/2019 2042 Inpatient Cardiology Consult Completed     4/18/2019 0440 [facility] (on-call MD unless specified)          Results from last 7 days   Lab Units  04/19/19  0642   WBC 10*3/mm3 3.63   HEMOGLOBIN g/dL 11.3*   HEMATOCRIT % 36.9*   PLATELETS 10*3/mm3 146     Results from last 7 days   Lab Units 04/19/19  0642   SODIUM mmol/L 139   POTASSIUM mmol/L 4.4   CHLORIDE mmol/L 104   CO2 mmol/L 22.3   BUN mg/dL 20   CREATININE mg/dL 0.95   GLUCOSE mg/dL 88   CALCIUM mg/dL 8.6     Significant Diagnostic Studies:   Lab Results   Component Value Date    WBC 3.63 04/19/2019    HGB 11.3 (L) 04/19/2019    HCT 36.9 (L) 04/19/2019     04/19/2019     Lab Results   Component Value Date     04/19/2019    K 4.4 04/19/2019     04/19/2019    CO2 22.3 04/19/2019    BUN 20 04/19/2019    CREATININE 0.95 04/19/2019    GLUCOSE 88 04/19/2019     Lab Results   Component Value Date    CALCIUM 8.6 04/19/2019     Lab Results   Component Value Date    AST 23 04/18/2019    ALT 19 04/18/2019    ALKPHOS 122 (H) 04/18/2019     Lab Results   Component Value Date    INR 2.04 (H) 04/19/2019     No results found for: COLORU, CLARITYU, SPECGRAV, PHUR, PROTEINUR, GLUCOSEU, KETONESU, BLOODU, NITRITE, LEUKOCYTESUR, BILIRUBINUR, UROBILINOGEN, RBCUA, WBCUA, BACTERIA, UACOMMENT  Lab Results   Component Value Date    TROPONINT 0.017 04/18/2019     No components found for: HGBA1C;2  No components found for: TSH;2  Imaging Results (all)     Procedure Component Value Units Date/Time    CT Chest With Contrast [[id number]] Collected:  04/18/19 1917     Updated:  04/19/19 0738    Narrative:       CT OF THE CHEST WITH CONTRAST 04/18/2019.     HISTORY: Chest pain.     TECHNIQUE: Axial images were obtained from the lung apices to the upper  abdomen after intravenous contrast.     FINDINGS: No pathologically enlarged hilar or mediastinal lymph nodes  are seen. Patient has had a previous partial esophagectomy with gastric  pull-through procedure. There is some food material in the intrathoracic  stomach. Small partially loculated right pleural effusion is seen with  some mild patchy atelectasis, scar or  inflammatory infiltrate in the  right lower lobe. Left lung appears clear. There is a small amount of  fluid in the major fissure on the right.     No pneumothorax is seen. Deformity of the posterior right rib is seen  which may be posttraumatic or postsurgical.       Impression:       1. Small partially loculated right pleural effusion with some mild  atelectasis, scar or inflammatory infiltrate in the right lower lobe.  2. Partial gastrectomy with intrathoracic stomach appears unremarkable.     Radiation dose reduction techniques were utilized, including automated  exposure control and exposure modulation based on body size.     This report was finalized on 4/19/2019 7:35 AM by Dr. Hever Traylor M.D.       CT Head Without Contrast [[id number]] Collected:  04/18/19 0413     Updated:  04/18/19 0419    Narrative:       CT OF THE HEAD WITHOUT CONTRAST     HISTORY: Tingling and numbness in the left arm     COMPARISON: None available.     TECHNIQUE: Axial CT imaging was obtained from vertex of the skull to the  skull base. No IV contrast was administered. .     FINDINGS:  No acute intracranial hemorrhage is seen. There is mild atrophy, in  keeping with the age of 69. There is no midline shift or mass effect. No  focal areas of decreased attenuation are seen. There is some mucosal  thickening identified within the ethmoid sinuses and mastoid air cells  appear clear. No focal soft tissue abnormalities are seen       Impression:       No acute intracranial process.     Radiation dose reduction techniques were utilized, including automated  exposure control and exposure modulation based on body size.     This report was finalized on 4/18/2019 4:16 AM by Dr. Melani Washington M.D.       XR Chest 2 View [[id number]] Collected:  04/18/19 0341     Updated:  04/18/19 0347    Narrative:       PA AND LATERAL CHEST RADIOGRAPH     HISTORY: Shortness of air     COMPARISON: 01/11/2019     FINDINGS:  Cardiomediastinal silhouette is  stable. Chronic scarring is identified  within the right hemithorax. This appears stable when compared to  January 2019. No acute infiltrates are identified. There is no  pneumothorax.       Impression:       No acute findings.     This report was finalized on 4/18/2019 3:44 AM by Dr. Melani Washington M.D.           Lab Results (last 7 days)     Procedure Component Value Units Date/Time    Basic Metabolic Panel [[id number]] Collected:  04/19/19 0642    Specimen:  Blood Updated:  04/19/19 0814     Glucose 88 mg/dL      BUN 20 mg/dL      Creatinine 0.95 mg/dL      Sodium 139 mmol/L      Potassium 4.4 mmol/L      Chloride 104 mmol/L      CO2 22.3 mmol/L      Calcium 8.6 mg/dL      eGFR Non African Amer 78 mL/min/1.73      BUN/Creatinine Ratio 21.1     Anion Gap 12.7 mmol/L     Narrative:       GFR Normal >60  Chronic Kidney Disease <60  Kidney Failure <15    Protime-INR [[id number]]  (Abnormal) Collected:  04/19/19 0642    Specimen:  Blood Updated:  04/19/19 0755     Protime 22.7 Seconds      INR 2.04    CBC Auto Differential [[id number]]  (Abnormal) Collected:  04/19/19 0642    Specimen:  Blood Updated:  04/19/19 0745     WBC 3.63 10*3/mm3      RBC 3.91 10*6/mm3      Hemoglobin 11.3 g/dL      Hematocrit 36.9 %      MCV 94.4 fL      MCH 28.9 pg      MCHC 30.6 g/dL      RDW 15.1 %      RDW-SD 52.0 fl      MPV 9.5 fL      Platelets 146 10*3/mm3      Neutrophil % 56.8 %      Lymphocyte % 25.3 %      Monocyte % 10.7 %      Eosinophil % 6.3 %      Basophil % 0.6 %      Immature Grans % 0.3 %      Neutrophils, Absolute 2.06 10*3/mm3      Lymphocytes, Absolute 0.92 10*3/mm3      Monocytes, Absolute 0.39 10*3/mm3      Eosinophils, Absolute 0.23 10*3/mm3      Basophils, Absolute 0.02 10*3/mm3      Immature Grans, Absolute 0.01 10*3/mm3      nRBC 0.0 /100 WBC     Troponin [[id number]]  (Normal) Collected:  04/18/19 1525    Specimen:  Blood Updated:  04/18/19 1611     Troponin T 0.017 ng/mL     Narrative:       Troponin T Reference  Range:  <= 0.03 ng/mL-   Negative for AMI  >0.03 ng/mL-     Abnormal for myocardial necrosis.  Clinicians would have to utilize clinical acumen, EKG, Troponin and serial changes to determine if it is an Acute Myocardial Infarction or myocardial injury due to an underlying chronic condition.     Protime-INR [[id number]]  (Abnormal) Collected:  04/18/19 1051    Specimen:  Blood Updated:  04/18/19 1125     Protime 22.4 Seconds      INR 2.00    Troponin [[id number]]  (Normal) Collected:  04/18/19 0923    Specimen:  Blood Updated:  04/18/19 1007     Troponin T 0.025 ng/mL     Narrative:       Troponin T Reference Range:  <= 0.03 ng/mL-   Negative for AMI  >0.03 ng/mL-     Abnormal for myocardial necrosis.  Clinicians would have to utilize clinical acumen, EKG, Troponin and serial changes to determine if it is an Acute Myocardial Infarction or myocardial injury due to an underlying chronic condition.     Basic Metabolic Panel [[id number]]  (Abnormal) Collected:  04/18/19 0614    Specimen:  Blood Updated:  04/18/19 0731     Glucose 103 mg/dL      BUN 23 mg/dL      Creatinine 1.04 mg/dL      Sodium 141 mmol/L      Potassium 4.3 mmol/L      Chloride 105 mmol/L      CO2 23.2 mmol/L      Calcium 8.7 mg/dL      eGFR Non African Amer 70 mL/min/1.73      BUN/Creatinine Ratio 22.1     Anion Gap 12.8 mmol/L     Narrative:       GFR Normal >60  Chronic Kidney Disease <60  Kidney Failure <15    CBC (No Diff) [[id number]]  (Abnormal) Collected:  04/18/19 0614    Specimen:  Blood Updated:  04/18/19 0706     WBC 3.87 10*3/mm3      RBC 3.70 10*6/mm3      Hemoglobin 10.7 g/dL      Hematocrit 34.7 %      MCV 93.8 fL      MCH 28.9 pg      MCHC 30.8 g/dL      RDW 14.9 %      RDW-SD 50.6 fl      MPV 9.8 fL      Platelets 164 10*3/mm3     Protime-INR [[id number]]  (Abnormal) Collected:  04/18/19 0324    Specimen:  Blood Updated:  04/18/19 0359     Protime 21.2 Seconds      INR 1.87    aPTT [[id number]]  (Abnormal) Collected:  04/18/19 0324     Specimen:  Blood Updated:  04/18/19 0359     PTT 39.4 seconds     Comprehensive Metabolic Panel [[id number]]  (Abnormal) Collected:  04/18/19 0324    Specimen:  Blood Updated:  04/18/19 0359     Glucose 104 mg/dL      BUN 22 mg/dL      Creatinine 1.12 mg/dL      Sodium 143 mmol/L      Potassium 4.4 mmol/L      Chloride 106 mmol/L      CO2 24.3 mmol/L      Calcium 8.7 mg/dL      Total Protein 7.3 g/dL      Albumin 3.90 g/dL      ALT (SGPT) 19 U/L      AST (SGOT) 23 U/L      Alkaline Phosphatase 122 U/L      Total Bilirubin 0.3 mg/dL      eGFR Non African Amer 65 mL/min/1.73      Globulin 3.4 gm/dL      A/G Ratio 1.1 g/dL      BUN/Creatinine Ratio 19.6     Anion Gap 12.7 mmol/L     Narrative:       GFR Normal >60  Chronic Kidney Disease <60  Kidney Failure <15    Troponin [[id number]]  (Normal) Collected:  04/18/19 0324    Specimen:  Blood Updated:  04/18/19 0359     Troponin T 0.029 ng/mL     Narrative:       Troponin T Reference Range:  <= 0.03 ng/mL-   Negative for AMI  >0.03 ng/mL-     Abnormal for myocardial necrosis.  Clinicians would have to utilize clinical acumen, EKG, Troponin and serial changes to determine if it is an Acute Myocardial Infarction or myocardial injury due to an underlying chronic condition.     BNP [[id number]]  (Normal) Collected:  04/18/19 0324    Specimen:  Blood Updated:  04/18/19 0358     proBNP 187.1 pg/mL     Narrative:       Among patients with dyspnea, NT-proBNP is highly sensitive for the detection of acute congestive heart failure. In addition NT-proBNP of <300 pg/ml effectively rules out acute congestive heart failure with 99% negative predictive value.    CBC & Differential [[id number]] Collected:  04/18/19 0324    Specimen:  Blood Updated:  04/18/19 0344    Narrative:       The following orders were created for panel order CBC & Differential.  Procedure                               Abnormality         Status                     ---------                               -----------    "      ------                     CBC Auto Differential[[id number]]        Abnormal            Final result                 Please view results for these tests on the individual orders.    CBC Auto Differential [[id number]]  (Abnormal) Collected:  04/18/19 0324    Specimen:  Blood Updated:  04/18/19 0344     WBC 4.14 10*3/mm3      RBC 3.91 10*6/mm3      Hemoglobin 11.3 g/dL      Hematocrit 35.7 %      MCV 91.3 fL      MCH 28.9 pg      MCHC 31.7 g/dL      RDW 15.1 %      RDW-SD 50.5 fl      MPV 9.7 fL      Platelets 177 10*3/mm3      Neutrophil % 60.9 %      Lymphocyte % 22.2 %      Monocyte % 11.1 %      Eosinophil % 5.1 %      Basophil % 0.5 %      Immature Grans % 0.2 %      Neutrophils, Absolute 2.52 10*3/mm3      Lymphocytes, Absolute 0.92 10*3/mm3      Monocytes, Absolute 0.46 10*3/mm3      Eosinophils, Absolute 0.21 10*3/mm3      Basophils, Absolute 0.02 10*3/mm3      Immature Grans, Absolute 0.01 10*3/mm3      nRBC 0.0 /100 WBC         /75   Pulse 80   Temp 98.1 °F (36.7 °C) (Oral)   Resp 22   Ht 195.6 cm (77\")   Wt 100 kg (221 lb)   SpO2 96%   BMI 26.21 kg/m²     Discharge Exam:  General Appearance:    Alert, cooperative, no distress                          Head:    Normocephalic, without obvious abnormality, atraumatic                          Eyes:                            Throat:   Lips, tongue, gums normal                          Neck:   Supple, symmetrical, trachea midline, no JVD                        Lungs:     Clear to auscultation bilaterally, respirations unlabored                Chest Wall:    No tenderness or deformity                        Heart:    Regular rate and rhythm, S1 and S2 normal, no murmur,no  Rub or gallop                  Abdomen:     Soft, non-tender, bowel sounds active, no masses, no organomegaly                  Extremities:   Extremities normal, atraumatic, no cyanosis or edema                             Skin:   Skin is warm and dry,  no rashes or palpable " lesions                  Neurologic:   no focal deficits noted     Disposition:  Home    Patient Instructions:      Discharge Medications      New Medications      Instructions Start Date   ferrous sulfate 325 (65 FE) MG tablet   325 mg, Oral, Daily With Breakfast         Continue These Medications      Instructions Start Date   PARoxetine 30 MG tablet  Commonly known as:  PAXIL   30 mg, Oral, Every Morning      rOPINIRole 0.5 MG tablet  Commonly known as:  REQUIP   0.5 mg, Oral, Nightly, Take 1 hour before bedtime.      warfarin 5 MG tablet  Commonly known as:  COUMADIN   5 mg, Oral, Daily           Future Appointments   Date Time Provider Department Center   4/23/2019  9:00 AM Luba Osuna APRN [facility] None   4/29/2019  8:10 AM MA [facility]002 [facility] None   6/20/2019  8:45 AM Brandin Bolton MD [facility] None   7/9/2019  9:40 AM Luba Osuna APRN [facility]   8/13/2019  9:10 AM LAB CHAIR [facility] None   8/13/2019  9:40 AM George Phelan II, MD [facility]   8/21/2019  1:30 PM Evie Malik APRN [facility] None     Follow-up Information     Brandin Bolton MD Follow up in 1 week(s).    Specialty:  Internal Medicine  Contact information:  [address] [place] [address]  [phone]                 Discharge Order (From admission, onward)    Start     Ordered    04/19/19 1609  Discharge patient  Once     Expected Discharge Date:  04/19/19    Discharge Disposition:  Home or Self Care    Physician of Record for Attribution - Please select from Treatment Team:  DUSTIN QUINTANA [[id number]]    Review needed by CMO to determine Physician of Record:  No       Question Answer Comment   Physician of Record for Attribution - Please select from Treatment Team DUSTIN QUINTANA    Review needed by CMO to determine Physician of Record No        04/19/19 1608          Total time spent discharging patient including evaluation,post hospitalization follow up,   medication and post hospitalization instructions and education total time exceeds 30 minutes.    Signed:  Fernando Fuchs MD  4/19/2019  4:13 PM

## 2019-04-19 NOTE — PLAN OF CARE
Problem: Patient Care Overview  Goal: Plan of Care Review  Outcome: Ongoing (interventions implemented as appropriate)   04/19/19 8677   Coping/Psychosocial   Plan of Care Reviewed With patient   Plan of Care Review   Progress improving   OTHER   Outcome Summary VSS. No new complaints. Patient ambulating independently around the nurses station. Cardiology consulted to see today. Echo to be completed. Will continue to monitor.        Problem: Cardiac: ACS (Acute Coronary Syndrome) (Adult)  Goal: Signs and Symptoms of Listed Potential Problems Will be Absent, Minimized or Managed (Cardiac: ACS)  Outcome: Ongoing (interventions implemented as appropriate)      Problem: Fall Risk (Adult)  Goal: Absence of Fall  Outcome: Ongoing (interventions implemented as appropriate)

## 2019-04-22 ENCOUNTER — TELEPHONE (OUTPATIENT)
Dept: INTERNAL MEDICINE | Age: 69
End: 2019-04-22

## 2019-04-22 DIAGNOSIS — G25.81 RLS (RESTLESS LEGS SYNDROME): Chronic | ICD-10-CM

## 2019-04-22 RX ORDER — PRAMIPEXOLE DIHYDROCHLORIDE 1.5 MG/1
1.5 TABLET ORAL 2 TIMES DAILY
Qty: 180 TABLET | Refills: 1 | Status: SHIPPED | OUTPATIENT
Start: 2019-04-22 | End: 2020-04-06 | Stop reason: SDUPTHER

## 2019-04-22 NOTE — TELEPHONE ENCOUNTER
Pt states the Requip 0.5mg is causing him to shake & jerk all over.    Pls advise.    Pt can be reached #[phone].

## 2019-04-22 NOTE — TELEPHONE ENCOUNTER
Discontinue ropinirole and go back to Mirapex for now.   See if he wants to return in about a month to consider other options for RLS.

## 2019-04-23 ENCOUNTER — OFFICE VISIT (OUTPATIENT)
Dept: ORTHOPEDIC SURGERY | Facility: CLINIC | Age: 69
End: 2019-04-23

## 2019-04-23 VITALS — WEIGHT: 219.4 LBS | TEMPERATURE: 98 F | BODY MASS INDEX: 25.91 KG/M2 | HEIGHT: 77 IN

## 2019-04-23 DIAGNOSIS — Z96.651 HISTORY OF TOTAL KNEE ARTHROPLASTY, RIGHT: Primary | ICD-10-CM

## 2019-04-23 PROCEDURE — 99212 OFFICE O/P EST SF 10 MIN: CPT | Performed by: NURSE PRACTITIONER

## 2019-04-23 PROCEDURE — 73562 X-RAY EXAM OF KNEE 3: CPT | Performed by: NURSE PRACTITIONER

## 2019-04-23 NOTE — PROGRESS NOTES
"Jose Hurtado : 1950 MRN: [id number] DATE: 2019    DIAGNOSIS: Annual follow up right total knee      SUBJECTIVE:Patient returns today for  1 year follow up of right total knee replacement. Patient reports doing well with no unusual complaints. Denies any limitations due to the knee.    OBJECTIVE:    Temp 98 °F (36.7 °C) (Temporal)   Ht 195.6 cm (77\")   Wt 99.5 kg (219 lb 6.4 oz)   BMI 26.02 kg/m²   Family History   Problem Relation Age of Onset   • Cerebral aneurysm Mother         cerebral artery aneurysm ( age 56)   • Prostate cancer Brother 68   • Anxiety disorder Father    • Suicide Attempts Father          of suicide   • Cancer Father         bladder   • No Known Problems Brother    • Colon cancer Neg Hx    • Esophageal cancer Neg Hx    • Dementia Neg Hx      Past Medical History:   Diagnosis Date   • Anemia    • Anti-phospholipid syndrome (CMS/HCC)     On lifelong anticoagulation therapy   • Bladder disorder     LEAKAGE   ON MED  wers pads   • Community acquired pneumonia     HISTORY OF IN    • Deep venous thrombosis (CMS/HCC) ,     Left lower extremity multiple   • Depression    • Esophageal carcinoma (CMS/HCC) 2014    had chemo and radiation prior surgery   • Hemorrhoids    • HH (hiatus hernia)    • History of atrial fibrillation 2015    ONE EPISODE WHILE HOSPITALIZED   • History of kidney stones    • History of nephrolithiasis    • History of pancreatitis     PT STATES MANY YEARS AGO   • History of radiation therapy    • Hypertension    • Long-term (current) use of anticoagulants, INR goal 2.0-3.0    • Malignant neoplasm of prostate (CMS/HCC)    • Other hyperlipidemia 2018 lipid panel risk 12.8%   • Restless legs syndrome    • Squamous carcinoma     on the head     Past Surgical History:   Procedure Laterality Date   • APPENDECTOMY     • BRONCHOSCOPY      (Diagnostic)   • CATARACT EXTRACTION Bilateral    • COLONOSCOPY  12/15/2014    " Complete / Description: EH, IH, torts, stool, follow-up colonoscopy due in 5 years.   • COLONOSCOPY N/A 6/13/2017    non-thrombosed external hemorrhoids, normal examined ileum, IH   • COLONOSCOPY N/A 2/26/2019    Procedure: COLONOSCOPY with Cold Polypectomy;  Surgeon: Mulugeta Millan MD;  Location: [facility] ENDOSCOPY;  Service: Gastroenterology   • CYSTOSCOPY W/ LASER LITHOTRIPSY     • ENDOSCOPY N/A 6/13/2017    Procedure: ESOPHAGOGASTRODUODENOSCOPY WITH COLD BIOPSY;  Surgeon: Lake Gonzalez MD;  Location: [facility] ENDOSCOPY;  Service:    • ESOPHAGECTOMY      April 2015, stage IIB esophageal carcinoma, sub-total resection.   • ESOPHAGECTOMY      Esophagectomy Subtotal [place] Joe Procedure   • EXCISION LESION  08/2012    Removal of Squamous Cell CA on Head   • HAMMER TOE REPAIR  09/2014    Hammertoe Operation (Each Toe), 10/2014   • HAMMER TOE REPAIR Left 10/3/2017    Procedure: Left second third and fourth distal interphalangeal joint resection with flexor tenotomy;  Surgeon: Mulugeta Lira MD;  Location: [facility];  Service:    • HERNIA REPAIR      incisional   • JEJUNOSTOMY      Laparoscopic   • JEJUNOSTOMY      tube removal    • KNEE SURGERY Bilateral 1967, 1973, 1981   • PATELLA SURGERY Left     removed   • PILONIDAL CYST / SINUS EXCISION     • VT TOTAL KNEE ARTHROPLASTY Right 3/26/2018    Procedure: TOTAL KNEE ARTHROPLASTY;  Surgeon: Renny Solis MD;  Location: [facility] OR;  Service: Orthopedics   • PROSTATECTOMY  2010   • PYLOROPLASTY     • SPINAL FUSION  02/1998    C 5,6   • TONSILLECTOMY  1955   • TONSILLECTOMY     • UPPER GASTROINTESTINAL ENDOSCOPY  12/15/2014    LA Grade D esophagitis, Pardo's, HH, multiple duodenal ulcers   • VENTRAL/INCISIONAL HERNIA REPAIR N/A 4/14/2016    Procedure: VENTRAL/INCISIONAL HERNIA REPAIR, open, with mesh, and component separation;  Surgeon: Darren Rivas MD;  Location: [facility] OR;  Service:      Social History     Socioeconomic History    • Marital status:      Spouse name: Lena   • Number of children: 0   • Years of education: College   • Highest education level: Not on file   Occupational History   • Occupation: Retired      Comment: Retired    Tobacco Use   • Smoking status: Former Smoker     Packs/day: 2.00     Years: 3.00     Pack years: 6.00     Types: Cigarettes     Last attempt to quit: 1974     Years since quittin.3   • Smokeless tobacco: Never Used   Substance and Sexual Activity   • Alcohol use: Yes     Frequency: 2-3 times a week     Comment: 2-3 x per week   • Drug use: No   • Sexual activity: Defer   Social History Narrative    self-employed      Review of Systems: 14 point review of systems performed, all systems negative     Exam:. The incision is well healed. Range of motion is measured at 0 to 115. The calf is soft and nontender with a negative Homans sign. Alignment is neutral. Good quad strength. There is no evidence of varus/valgus or flexion instability. No effusion. Intact to light touch with palpable distal pulses.     DIAGNOSTIC STUDIES  Xrays: 3 views(AP bilateral knees, lateral right, and sunrise bilateral knees) were ordered and reviewed for evaluation of right knee replacement. They demonstrate a well positioned, well aligned knee replacement without complicating factors noted. In comparison with previous films there has been no change.    ASSESSMENT: Annual follow up right knee replacement.    PLAN:  Continue activities as tolerated    Follow up prmariia Osuna, APRN  2019

## 2019-04-29 ENCOUNTER — ANTICOAGULATION VISIT (OUTPATIENT)
Dept: INTERNAL MEDICINE | Age: 69
End: 2019-04-29

## 2019-04-29 LAB — INR PPP: 2.5 (ref 2–3)

## 2019-04-29 PROCEDURE — 36416 COLLJ CAPILLARY BLOOD SPEC: CPT | Performed by: INTERNAL MEDICINE

## 2019-04-29 PROCEDURE — 85610 PROTHROMBIN TIME: CPT | Performed by: INTERNAL MEDICINE

## 2019-04-29 PROCEDURE — 93793 ANTICOAG MGMT PT WARFARIN: CPT | Performed by: INTERNAL MEDICINE

## 2019-05-13 ENCOUNTER — TELEPHONE (OUTPATIENT)
Dept: INTERNAL MEDICINE | Age: 69
End: 2019-05-13

## 2019-05-13 ENCOUNTER — ANTICOAGULATION VISIT (OUTPATIENT)
Dept: INTERNAL MEDICINE | Age: 69
End: 2019-05-13

## 2019-05-13 LAB — INR PPP: 2.9 (ref 2–3)

## 2019-05-13 PROCEDURE — 36416 COLLJ CAPILLARY BLOOD SPEC: CPT | Performed by: INTERNAL MEDICINE

## 2019-05-13 PROCEDURE — 93793 ANTICOAG MGMT PT WARFARIN: CPT | Performed by: INTERNAL MEDICINE

## 2019-05-13 PROCEDURE — 85610 PROTHROMBIN TIME: CPT | Performed by: INTERNAL MEDICINE

## 2019-05-13 NOTE — TELEPHONE ENCOUNTER
"Pt and his wife are wanting to know if he should be checked for diabetes due to him not being checked for an A1C ever and he has a \"sweet tooth\" and does urinate a lot, however he does drink a lot of water due to a kidney stone at the current time.     Please advise.   "

## 2019-05-23 ENCOUNTER — OFFICE VISIT (OUTPATIENT)
Dept: SLEEP MEDICINE | Facility: HOSPITAL | Age: 69
End: 2019-05-23

## 2019-05-23 VITALS
HEART RATE: 74 BPM | HEIGHT: 77 IN | WEIGHT: 227 LBS | OXYGEN SATURATION: 97 % | BODY MASS INDEX: 26.8 KG/M2 | DIASTOLIC BLOOD PRESSURE: 67 MMHG | SYSTOLIC BLOOD PRESSURE: 132 MMHG

## 2019-05-23 DIAGNOSIS — G47.33 OBSTRUCTIVE SLEEP APNEA: Primary | ICD-10-CM

## 2019-05-23 PROCEDURE — G0463 HOSPITAL OUTPT CLINIC VISIT: HCPCS

## 2019-05-23 NOTE — PROGRESS NOTES
[facility]  Telephone: [phone] / Fax: [phone] [place]  Telephone: [phone] / Fax: [phone] Rosamraia Husain    PCP: Brandin Bolton MD    Reason for visit: JAI f/u    Jose Hurtado is a 69 y.o.male  was last seen at [facility] sleep lab in February 2019. He has been using the CPAP machine nightly.  However, he wakes up in the middle of the night-unable to catch his breath, and takes his mask off at night. Mask seems to be fitting ok. Air does not seem to be leaking.  He feels that the pressures may not be high enough.  He uses Mirapex and it appears to be working well. No RLS.  His current pressures are set at 10-15 cm.  His sleep schedule is 10 PM-5 AM.  His ESS is 18.    Social history, no tobacco, 2-3 alcoholic drinks a week, 2-3 caffeine a day.    Review of systems, positive for nasal congestion, shortness of breath    Current Medications:    Current Outpatient Medications:   •  ferrous sulfate 325 (65 FE) MG tablet, Take 1 tablet by mouth Daily With Breakfast., Disp: 30 tablet, Rfl: 0  •  PARoxetine (PAXIL) 30 MG tablet, Take 1 tablet by mouth Every Morning., Disp: 90 tablet, Rfl: 1  •  pramipexole (MIRAPEX) 1.5 MG tablet, Take 1 tablet by mouth 2 (Two) Times a Day., Disp: 180 tablet, Rfl: 1  •  warfarin (COUMADIN) 5 MG tablet, Take 1 tablet by mouth Daily., Disp: 90 tablet, Rfl: 2   also entered in Sleep Questionnaire    Patient  has a past medical history of Anemia, Anti-phospholipid syndrome (CMS/HCC), Bladder disorder, Community acquired pneumonia, Deep venous thrombosis (CMS/HCC) (2006, 2008), Depression, Esophageal carcinoma (CMS/HCC) (12/31/2014), Hemorrhoids, HH (hiatus hernia), History of atrial fibrillation (2015), History of kidney stones, History of nephrolithiasis, History of pancreatitis, History of radiation therapy, Hypertension, Long-term (current) use of anticoagulants, INR goal 2.0-3.0, Malignant neoplasm of prostate (CMS/HCC), Other hyperlipidemia (1/30/2018),  "Restless legs syndrome, and Squamous carcinoma.    I have reviewed the Past Medical History, Past Surgical History, Social History and Family History.            ESS  18   Vital Signs /67   Pulse 74   Ht 195.6 cm (77\")   Wt 103 kg (227 lb)   SpO2 97%   BMI 26.92 kg/m²  Body mass index is 26.92 kg/m².    General Alert and oriented. No acute distress noted   Pharynx/Throat Class IV Mallampati airway, large tongue, no evidence of redundant lateral pharyngeal tissue. No oral lesions. No thrush. Moist mucous membranes.   Head Normocephalic. Symmetrical. Atraumatic.    Nose No septal deviation. No drainage   Chest Wall Normal shape. Symmetric expansion with respiration. No tenderness.   Neck Trachea midline, no thyromegaly or adenopathy    Lungs Clear to auscultation bilaterally. No wheezes. No rhonchi. No rales. Respirations regular, even and unlabored.   Heart Regular rhythm and normal rate. Normal S1 and S2. No murmur   Abdomen Soft, non-tender and non-distended. Normal bowel sounds. No masses.   Extremities Moves all extremities well. No edema   Psychiatric Normal mood and affect.     Testing:  Download February 22, 2019-May 22, 2019, 95% compliance with average nightly use of 2 hours and 1 minute on auto CPAP 10-15 cm with average pressure of 10.6, average AHI 2.6.      Overnight split polysomnogram study- 11/2018  Diagnostic study from 10 PM to 11:35 PM.  Sleep efficiency 74.6% with 1.18 hours total sleep time.  Sleep distribution showed absence of slow-wave sleep and REM sleep.  AHI index is 38.9 with RDI of 51.6.  This indicates severe obstructive sleep apnea.  Patient slept supine for the entire portion of the diagnostic study.  Due to absence of REM sleep severity may be underestimated.  Oxygen saturation remained above 88%.  Arousal index is 53 events an hour.  Patient had 6.56% time snoring.    Titration study from 11:35 PM to 5:15 AM.  Sleep efficiency was poor at 46.9% with 2.66 hours total sleep " time.  Some slow-wave sleep and REM sleep was seen during the titration study.  AHI index improved with application of positive airway pressure device.  Oxygen saturation remained above 88%.  CPAP increased from 5-14 cm water pressure.  Maximum sleep at 6 cm water pressure.  However this is not adequate to completely correct the obstructive sleep apnea.  CPAP was titrated to a maximum of 14 cm water pressure.       Impression:  1. Obstructive sleep apnea          Plan:  Change pressures to 12-15 as he wakes up unable to catch his breath and feels pressures are too low. I discussed importance of regular cleaning of CPAP accessories to prevent infections. He will consider getting Soclean. Info on how to clean his mask was provided. I asked him to RTC in 4 months to reevaluate. He uses the CPAP device and benefits from its use in terms of reduction of hypersomnia and snoring.  AHI appears to be within adequate range. I reviewed download report with the patient. Weight loss will be strongly beneficial to reduce the severity of sleep-disordered breathing.  Caution during activities that require prolonged concentration is strongly advised if sleepiness returns. Changing of PAP supplies regularly is important for effective use. Patient needs to change cushion on the mask or plugs on nasal pillows along with disposable filters once every month and change mask frame, tubing, headgear and Velcro straps every 6 months at the minimum.       Thank you for allowing me to participate in your patient's care.      FAYE Rivero  [facility]  Phone: [phone]      Part of this note may be an electronic transcription/translation of spoken language to printed text using the Dragon Dictation System.

## 2019-05-29 ENCOUNTER — TELEPHONE (OUTPATIENT)
Dept: SLEEP MEDICINE | Facility: HOSPITAL | Age: 69
End: 2019-05-29

## 2019-05-29 NOTE — TELEPHONE ENCOUNTER
Tech called pts home #, no answer. Pt had called into sleep center left vm informing was having issues with pap pressure but no other information given. Tech lvm for pt to return call to go over issues. MAB

## 2019-05-30 NOTE — PROGRESS NOTES
"[facility] INTERNAL MEDICINE  MD Jose VARGAS W Hurtado / 68 y.o. / male  09/13/2018      ASSESSMENT & PLAN:    Problem List Items Addressed This Visit        Unprioritized    Hypertension - Primary      Other Visit Diagnoses     Localized edema        Encounter for immunization        Relevant Orders    Hepatitis A Vaccine Adult IM        Orders Placed This Encounter   Procedures   • Hepatitis A Vaccine Adult IM       Summary/Discussion:    Number-one hypertension stable on current medication.  Plan: Same meds, blood pressure check 4 months.    #2 edema proceed with scheduled visit with lymphedema clinic tomorrow.    #3 hepatitis a shot #1 today          Return in about 4 months (around 1/13/2019) for Blood pressure check.    ____________________________________________________________________    VITALS    Visit Vitals  /60   Pulse 80   Temp 97.7 °F (36.5 °C)   Ht 195.6 cm (77.01\")   Wt 107 kg (236 lb)   SpO2 98%   BMI 27.98 kg/m²       BP Readings from Last 3 Encounters:   09/13/18 115/60   09/04/18 138/64   08/03/18 130/68     Wt Readings from Last 3 Encounters:   09/13/18 107 kg (236 lb)   09/04/18 106 kg (233 lb)   08/03/18 106 kg (233 lb 6.4 oz)      Body mass index is 27.98 kg/m².    CC:  Main reason(s) for today's visit: Hypertension      HPI:     Patient presents this afternoon for follow-up of hypertension.  He is compliant with medication, dietary salt restriction, regular activity, and home blood pressure monitoring.  Blood pressure at home this morning 124/62    Edema: Patient is wearing compression hose, we did try Bumex and potassium but unfortunately, that did not help with improvement of symptoms.  Has appointment tomorrow with the lymphedema clinic.    Immunization update, recommend high-dose flu shot next month, shingles vaccine after the holidays, patient would like his second hepatitis A immunization today.    Laboratory review September 2018, CBC hemoglobin 10.9, white count 3.14, " Start stretches twice daily  Start mobic 15mg daily   Please call [phone] to schedule your colonoscopy     this is followed by hematology.  Lipids normal May 2018: See below for specific values.  CMP normal September 2018        Patient Care Team:  George Mooney MD as PCP - General  Code, George THORPE II, MD as Consulting Physician (Hematology and Oncology)  Jose Inman MD as Consulting Physician (Urology)  Mulugeta Millan MD as Consulting Physician (Gastroenterology)  Seth Solomon MD as Consulting Physician (Cardiology)  Jose Christine III, MD as Referring Physician (Thoracic Surgery)  ____________________________________________________________________    REVIEW OF SYSTEMS    Review of Systems   Respiratory: Negative for chest tightness and shortness of breath.    Cardiovascular: Negative for chest pain and palpitations.   Musculoskeletal:        Patient is having some issues with an altered gait following his right knee replacement in March of this year.  He is seeing orthopedics and physical therapy in an effort to rectify this situation.   Neurological: Negative for dizziness, syncope, speech difficulty, weakness, light-headedness and headaches.         PHYSICAL EXAMINATION    Physical Exam   Constitutional: He is oriented to person, place, and time. He appears well-developed. No distress.   o/w   Cardiovascular: Normal rate, regular rhythm, normal heart sounds and intact distal pulses.  Exam reveals no gallop and no friction rub.    No murmur heard.  Pulmonary/Chest: Effort normal and breath sounds normal. He has no wheezes. He has no rales.   Musculoskeletal: He exhibits no edema.   Neurological: He is alert and oriented to person, place, and time.   Skin: Skin is warm and dry. No rash noted.   Psychiatric: He has a normal mood and affect. His behavior is normal. Judgment and thought content normal.   Nursing note and vitals reviewed.        REVIEWED DATA:    Labs:     Lab Results   Component Value Date     09/04/2018    K 4.2 09/04/2018    AST 23 09/04/2018    ALT 18 09/04/2018    BUN  30 (H) 09/04/2018    CREATININE 1.14 09/04/2018    CREATININE 0.93 05/10/2018    CREATININE 1.16 03/15/2018    EGFRIFNONA 64 09/04/2018    EGFRIFAFRI 97 05/10/2018       Lab Results   Component Value Date    GLUCOSE 66 09/04/2018    GLUCOSE 108 (H) 03/15/2018    GLUCOSE 103 03/08/2018       Lab Results   Component Value Date    LDL 92 05/10/2018     (H) 01/30/2018    LDL 98 01/26/2017    HDL 45 05/10/2018    TRIG 79 05/10/2018       Lab Results   Component Value Date    TSH 3.29 04/28/2015       Lab Results   Component Value Date    WBC 3.14 (L) 09/04/2018    HGB 10.9 (L) 09/04/2018    HGB 9.0 (L) 03/30/2018    HGB 7.6 (L) 03/29/2018     09/04/2018       No results found for: PSA      Imaging:         Medical Tests:         Summary of old records / correspondence / consultant report:         Request outside records:         ALLERGIES  No Known Allergies     MEDICATIONS  Current Outpatient Prescriptions   Medication Sig Dispense Refill   • diphenhydrAMINE-acetaminophen (TYLENOL PM)  MG tablet per tablet Take 2 tablets by mouth At Night As Needed.     • PARoxetine (PAXIL) 30 MG tablet Take 1 tablet by mouth Every Night. 90 tablet 1   • pramipexole (MIRAPEX) 1.5 MG tablet Take 2 tablets by mouth Every Night. 90 tablet 1   • warfarin (COUMADIN) 5 MG tablet TAKE ONE TABLET BY MOUTH DAILY - STOP 6 DAYS BEFORE SURGERY AND BRIDGE WITH LOVENOX 30 tablet 1     No current facility-administered medications for this visit.        PFSH:     The following portions of the patient's history were reviewed and updated as appropriate: Allergies / Current Medications / Past Medical History / Surgical History / Social History / Family History    PROBLEM LIST   Patient Active Problem List   Diagnosis   • Adenocarcinoma of esophagus (CMS/HCC)   • Adenomatous polyp of colon   • Anemia   • Duodenal ulcer   • Insomnia   • Malignant neoplasm of prostate (CMS/HCC)   • RLS (restless legs syndrome)   • Thrombophlebitis of deep  veins of lower extremity (CMS/HCC)   • Ventral hernia without obstruction or gangrene   • Incisional hernia, without obstruction or gangrene   • Hernia, incisional   • Osteoarthrosis, localized, primary, knee   • BPH (benign prostatic hyperplasia)   • Stasis dermatitis   • Medicare annual wellness visit, subsequent   • Reactive depression   • DVT (deep venous thrombosis) (CMS/HCC)   • Hammer toes of both feet   • Other hammer toe(s) (acquired), left foot   • Status post left foot surgery   • Surgery follow-up-Left second third and fourth distal interphalangeal joint resection with flexor tenotomy - Left   • Other hyperlipidemia   • Primary osteoarthritis of right knee   • OA (osteoarthritis) of knee   • Hypertension   • Anti-phospholipid syndrome (CMS/HCC)       PAST MEDICAL HISTORY  Past Medical History:   Diagnosis Date   • Anemia    • Anti-phospholipid syndrome (CMS/HCC)     On lifelong anticoagulation therapy   • Arthritis     RIGHT KNEE   • Bladder disorder     LEAKAGE   ON MED  wers pads   • Community acquired pneumonia     HISTORY OF IN 2014   • Deep venous thrombosis (CMS/HCC) 2006, 2008    Left lower extremity multiple   • Depression    • Esophageal carcinoma (CMS/HCC) 12/31/2014    had chemo and radiation prior surgery   • Fatigue    • Hammer toe of left foot     had surgery   • Hemorrhoids    • HH (hiatus hernia)    • History of atrial fibrillation 2015    ONE EPISODE WHILE HOSPITALIZED   • History of kidney stones    • History of nephrolithiasis    • History of pancreatitis     PT STATES MANY YEARS AGO   • History of radiation therapy    • Hypertension    • Long-term (current) use of anticoagulants, INR goal 2.0-3.0    • Malignant neoplasm of prostate (CMS/HCC)    • Restless legs syndrome    • Squamous carcinoma     on the head   • Stasis dermatitis    • Warfarin anticoagulation        SURGICAL HISTORY  Past Surgical History:   Procedure Laterality Date   • APPENDECTOMY  1950   • BRONCHOSCOPY       (Diagnostic)   • CATARACT EXTRACTION Bilateral 2014   • COLONOSCOPY  12/15/2014    Complete / Description: EH, IH, torts, stool, follow-up colonoscopy due in 5 years.   • COLONOSCOPY N/A 6/13/2017    Procedure: COLONOSCOPY TO CECUM AND INTO TERMINAL ILEUM;  Surgeon: Mulugeta BLACKWELL MD;  Location: [facility] ENDOSCOPY;  Service:    • CYSTOSCOPY W/ LASER LITHOTRIPSY     • ENDOSCOPY N/A 6/13/2017    Procedure: ESOPHAGOGASTRODUODENOSCOPY WITH COLD BIOPSY;  Surgeon: Lake Gonzalez MD;  Location: [facility] ENDOSCOPY;  Service:    • ESOPHAGECTOMY      April 2015, stage IIB esophageal carcinoma, sub-total resection.   • ESOPHAGECTOMY      Esophagectomy Subtotal [place] Procedure   • EXCISION LESION  08/2012    Removal of Squamous Cell CA on Head   • HAMMER TOE REPAIR  09/2014    Hammertoe Operation (Each Toe), 10/2014   • HAMMER TOE REPAIR Left 10/3/2017    Procedure: Left second third and fourth distal interphalangeal joint resection with flexor tenotomy;  Surgeon: Mulugeta Lira MD;  Location: [facility] OR OSC;  Service:    • HERNIA REPAIR      incisional   • JEJUNOSTOMY      Laparoscopic   • JEJUNOSTOMY      tube removal    • KNEE SURGERY Bilateral 1967, 1973, 1981   • KNEE SURGERY Right 03/26/2018    TKR   • PATELLA SURGERY Left     removed   • PILONIDAL CYST / SINUS EXCISION     • ND TOTAL KNEE ARTHROPLASTY Right 3/26/2018    Procedure: TOTAL KNEE ARTHROPLASTY;  Surgeon: Renny Solis MD;  Location: [facility] MAIN OR;  Service: Orthopedics   • PROSTATECTOMY  2010   • PYLOROPLASTY     • SPINAL FUSION  02/1998    C 5,6   • TONSILLECTOMY  1955   • TONSILLECTOMY     • UPPER GASTROINTESTINAL ENDOSCOPY  12/15/2014    [place] Grade D esophagitis, Pardo's, HH, multiple duodenal ulcers   • UPPER GASTROINTESTINAL ENDOSCOPY      (Therapeutic)   • VENTRAL/INCISIONAL HERNIA REPAIR N/A 4/14/2016    Procedure: VENTRAL/INCISIONAL HERNIA REPAIR, open, with mesh, and component separation;  Surgeon: Darren Rivas MD;   Location: [facility] MAIN OR;  Service:        SOCIAL HISTORY  Social History     Social History   • Marital status:      Spouse name: Lena   • Number of children: 0   • Years of education: College     Occupational History   •  Self-Employed   •  Retired     Social History Main Topics   • Smoking status: Former Smoker     Packs/day: 2.00     Years: 2.00     Types: Cigarettes     Quit date: 1974   • Smokeless tobacco: Never Used   • Alcohol use 1.8 oz/week     1 Glasses of wine, 1 Cans of beer, 1 Shots of liquor per week   • Drug use: No   • Sexual activity: Defer     Other Topics Concern   • Not on file     Social History Narrative    self-employed        FAMILY HISTORY  Family History   Problem Relation Age of Onset   • Cerebral aneurysm Mother         cerebral artery aneurysm   • Prostate cancer Brother 68   • Anxiety disorder Father    • Malig Hyperthermia Neg Hx        Health Maintenance Due   Topic   • ZOSTER VACCINE (2 of 3)   • INFLUENZA VACCINE        Overdue health maintenance interventions  reviewed with patient.    Dictated utilizing Dragon voice recognition software

## 2019-05-31 ENCOUNTER — TELEPHONE (OUTPATIENT)
Dept: SLEEP MEDICINE | Facility: HOSPITAL | Age: 69
End: 2019-05-31

## 2019-05-31 NOTE — TELEPHONE ENCOUNTER
Pt called today to report he is getting better sleep with new pressure adjustment.  Pt's concern is that he only gets 1hr sleep at a time.  Pt is asking for prescription for a sleeping pill because his sleep is not contiguous and he feels that is a problem.  Notes forwarded to FAYE Lucero for review and recommendations. fabby

## 2019-06-03 ENCOUNTER — TELEPHONE (OUTPATIENT)
Dept: INTERNAL MEDICINE | Age: 69
End: 2019-06-03

## 2019-06-03 ENCOUNTER — TELEPHONE (OUTPATIENT)
Dept: SLEEP MEDICINE | Facility: HOSPITAL | Age: 69
End: 2019-06-03

## 2019-06-03 NOTE — TELEPHONE ENCOUNTER
Tech left message for pt to CB to discuss recommendations by FAYE below as to his request for sleeping medication. jbo        ----- Message from FAYE Rivero sent at 5/31/2019  2:32 PM EDT -----  Astrid, I reviewed your conversation msg with patient. I am happy that he is doing better on higher pressures. For insomnia, he could try Melatonin 3mg at bedtime. I want him to use it consistently for next 4-6 weeks. If not better, ask him to give us a call and we can discuss prescription low dose trazodone. Trazodone is an anti-depressant that is also used for insomnia as it is sedating. It has some side effects that he should be aware of. Go over those with him. See what he thinks.    Adverse Effects     Common    Gastrointestinal: Constipation (7% to 8% ), Diarrhea (up to 9% ), Nausea (21% ), Vomiting (at least 1% ), Xerostomia (14% to 33.8% )  Musculoskeletal: Backache (5% )  Neurologic: Confusion (up to 5.7% ), Dizziness (25% ), Headache (9.9% to 33% ), Insomnia (6.4% to 9.9% ), Somnolence (23.9% to 46% )  Ophthalmic: Blurred vision (5% to 14.7% )  Psychiatric: Dream disorder (up to 5.1% ), Feeling nervous (6.4% to 14.8% )  Other: Fatigue (5.7% to 15% )    Serious    Cardiovascular: Cardiac dysrhythmia, Hypotension (3.8% to 7% ), Prolonged QT interval, Torsades de pointes  Immunologic: Hypersensitivity reaction (less than 1% )  Neurologic: Seizure, Serotonin syndrome  Psychiatric: Suicidal thoughts (rare), Suicide  Reproductive: Priapism

## 2019-06-06 ENCOUNTER — OFFICE VISIT (OUTPATIENT)
Dept: INTERNAL MEDICINE | Age: 69
End: 2019-06-06

## 2019-06-06 VITALS
DIASTOLIC BLOOD PRESSURE: 66 MMHG | SYSTOLIC BLOOD PRESSURE: 114 MMHG | WEIGHT: 228 LBS | TEMPERATURE: 98.9 F | HEIGHT: 77 IN | BODY MASS INDEX: 26.92 KG/M2 | OXYGEN SATURATION: 97 % | HEART RATE: 76 BPM

## 2019-06-06 DIAGNOSIS — G62.9 PERIPHERAL POLYNEUROPATHY: Primary | ICD-10-CM

## 2019-06-06 DIAGNOSIS — F51.05 INSOMNIA DUE TO OTHER MENTAL DISORDER: ICD-10-CM

## 2019-06-06 DIAGNOSIS — R53.83 FATIGUE, UNSPECIFIED TYPE: ICD-10-CM

## 2019-06-06 DIAGNOSIS — F99 INSOMNIA DUE TO OTHER MENTAL DISORDER: ICD-10-CM

## 2019-06-06 PROCEDURE — 99214 OFFICE O/P EST MOD 30 MIN: CPT | Performed by: INTERNAL MEDICINE

## 2019-06-06 RX ORDER — TRAZODONE HYDROCHLORIDE 50 MG/1
50 TABLET ORAL NIGHTLY
Qty: 30 TABLET | Refills: 0 | Status: SHIPPED | OUTPATIENT
Start: 2019-06-06 | End: 2019-06-28 | Stop reason: SDUPTHER

## 2019-06-06 RX ORDER — TAMSULOSIN HYDROCHLORIDE 0.4 MG/1
1 CAPSULE ORAL NIGHTLY
COMMUNITY
End: 2020-09-01

## 2019-06-06 RX ORDER — CHOLECALCIFEROL (VITAMIN D3) 125 MCG
5 CAPSULE ORAL
COMMUNITY
End: 2019-06-21

## 2019-06-06 NOTE — ASSESSMENT & PLAN NOTE
This is a new problem to this examiner.   Check B12 and TSH and Free T4.   Consider NCV / EMG for lower extremities.   Consider MRI of lumbar spine.

## 2019-06-06 NOTE — PROGRESS NOTES
[facility] [place] INTERNAL MEDICINE  RICHARDSON BOLTON M.D.      Jose FITCH Hurtado / 69 y.o. / male  06/06/2019      CC:  Main reason(s) for today's visit: Numbness (and feet x 1 year) and Extremity Weakness      HPI:     Complains of moderate paresthesia and shooting/burning discomfort of feet (left>>right). Has RLS but this seems different.   Complains of chronic weakness of strength of legs. Had right TKR 3/2018, and low back pain. Denies bowel/bladder control problems. Denies injury or trauma. Has moderate difficulty walking and started using a cane. S/p prostatectomy 2010 for cancer. Complains of chronic sleep problem, difficulty staying asleep. Tired during daytime.     Patient Care Team:  Brandin Bolton MD as PCP - General (Internal Medicine)  Tapan, George THORPE II, MD as Consulting Physician (Hematology and Oncology)  Jose Inman MD as Consulting Physician (Urology)  Mulugeta Millan MD as Consulting Physician (Gastroenterology)  Seth Randhawa MD as Consulting Physician (Ophthalmology)    ASSESSMENT & PLAN:    1. Peripheral polyneuropathy    2. Fatigue, unspecified type    3. Insomnia due to other mental disorder      Orders Placed This Encounter   Procedures   • Vitamin B12 & Folate   • TSH+Free T4     New Medications Ordered This Visit   Medications   • traZODone (DESYREL) 50 MG tablet     Sig: Take 1 tablet by mouth Every Night.     Dispense:  30 tablet     Refill:  0        Summary/Discussion:  Check B12 level and TSH and Free T4.   Consider NCV/EMG study of BLE.   Consider MRI of lumbar spine for further evaluation.   Trial trazodone 50 mg 1/2-1 qHS for sleep.       Next Appointment with me: 6/20/2019    Return for Next scheduled follow up.    ____________________________________________________________________    MEDICATIONS  Current Outpatient Medications   Medication Sig Dispense Refill   • ferrous sulfate 325 (65 FE) MG tablet Take 1 tablet by mouth Daily With Breakfast. 30 tablet 0   • melatonin 5 MG tablet  "tablet Take 5 mg by mouth.     • PARoxetine (PAXIL) 30 MG tablet Take 1 tablet by mouth Every Morning. 90 tablet 1   • pramipexole (MIRAPEX) 1.5 MG tablet Take 1 tablet by mouth 2 (Two) Times a Day. 180 tablet 1   • tamsulosin (FLOMAX) 0.4 MG capsule 24 hr capsule Take 1 capsule by mouth Every Night.     • warfarin (COUMADIN) 5 MG tablet Take 1 tablet by mouth Daily. 90 tablet 2   • traZODone (DESYREL) 50 MG tablet Take 1 tablet by mouth Every Night. 30 tablet 0     No current facility-administered medications for this visit.               ____________________________________________________________________      REVIEW OF SYSTEMS    Review of Systems  Fatigue, no night sweat or abnormal weight loss  No chest pain  No shortness of breath  Neuro paresthesia of feet, weakness of legs (chronic)  Psych depression same     VITALS    Visit Vitals  /66   Pulse 76   Temp 98.9 °F (37.2 °C)   Ht 195.6 cm (77.01\")   Wt 103 kg (228 lb)   SpO2 97%   BMI 27.03 kg/m²       BP Readings from Last 3 Encounters:   06/06/19 114/66   05/23/19 132/67   04/19/19 147/75     Wt Readings from Last 3 Encounters:   06/06/19 103 kg (228 lb)   05/23/19 103 kg (227 lb)   04/23/19 99.5 kg (219 lb 6.4 oz)      Body mass index is 27.03 kg/m².    PHYSICAL EXAMINATION    Physical Exam  No acute distress   Walks with cane, abnormal gait with knees somewhat flexed, no shuffling gait  Mood and judgment intact       REVIEWED DATA:    Labs:   Lab Results   Component Value Date    WBC 3.63 04/19/2019    HGB 11.3 (L) 04/19/2019     04/19/2019      Lab Results   Component Value Date    TSH 3.440 02/13/2019    TSH 3.29 04/28/2015    FREET4 1.33 02/13/2019      Lab Results   Component Value Date     04/19/2019    K 4.4 04/19/2019    AST 23 04/18/2019    ALT 19 04/18/2019    BUN 20 04/19/2019    CREATININE 0.95 04/19/2019    CREATININE 1.04 04/18/2019    CREATININE 1.12 04/18/2019        Imaging:        Medical Tests:       Summary of old " records / correspondence / consultant report:        Request outside records:       ALLERGIES  No Known Allergies     PFSH:     The following portions of the patient's history were reviewed and updated as appropriate: Allergies / Current Medications / Past Medical History / Surgical History / Social History / Family History    PROBLEM LIST   Patient Active Problem List   Diagnosis   • Adenocarcinoma of esophagus (CMS/HCC)   • Adenomatous polyp of colon   • Malignant neoplasm of prostate (CMS/HCC)   • RLS (restless legs syndrome)   • Depression   • Hypertension   • Anti-phospholipid syndrome (CMS/HCC)   • Rectal bleeding   • Change in bowel habits   • Chest pain   • Anemia   • Insomnia due to other mental disorder       PAST MEDICAL HISTORY  Past Medical History:   Diagnosis Date   • Anemia    • Anti-phospholipid syndrome (CMS/HCC)     On lifelong anticoagulation therapy   • Bladder disorder     LEAKAGE   ON MED  wers pads   • Community acquired pneumonia     HISTORY OF IN 2014   • Deep venous thrombosis (CMS/HCC) 2006, 2008    Left lower extremity multiple   • Depression    • Esophageal carcinoma (CMS/HCC) 12/31/2014    had chemo and radiation prior surgery   • Hemorrhoids    • HH (hiatus hernia)    • History of atrial fibrillation 2015    ONE EPISODE WHILE HOSPITALIZED   • History of kidney stones    • History of nephrolithiasis    • History of pancreatitis     PT STATES MANY YEARS AGO   • History of radiation therapy    • Hypertension    • Long-term (current) use of anticoagulants, INR goal 2.0-3.0    • Malignant neoplasm of prostate (CMS/HCC)    • Other hyperlipidemia 1/30/2018 January 30, 2018 lipid panel risk 12.8%   • Restless legs syndrome    • Squamous carcinoma     on the head       SURGICAL HISTORY  Past Surgical History:   Procedure Laterality Date   • APPENDECTOMY  1950   • BRONCHOSCOPY      (Diagnostic)   • CATARACT EXTRACTION Bilateral 2014   • COLONOSCOPY  12/15/2014    Complete / Description: NOAH  IH, torts, stool, follow-up colonoscopy due in 5 years.   • COLONOSCOPY N/A 6/13/2017    non-thrombosed external hemorrhoids, normal examined ileum, IH   • COLONOSCOPY N/A 2/26/2019    Procedure: COLONOSCOPY with Cold Polypectomy;  Surgeon: Mulugeta Millan MD;  Location: [facility] ENDOSCOPY;  Service: Gastroenterology   • CYSTOSCOPY W/ LASER LITHOTRIPSY     • ENDOSCOPY N/A 6/13/2017    Procedure: ESOPHAGOGASTRODUODENOSCOPY WITH COLD BIOPSY;  Surgeon: Lake Gonzalez MD;  Location: [facility] ENDOSCOPY;  Service:    • ESOPHAGECTOMY      April 2015, stage IIB esophageal carcinoma, sub-total resection.   • ESOPHAGECTOMY      Esophagectomy Subtotal [place] Joe Procedure   • EXCISION LESION  08/2012    Removal of Squamous Cell CA on Head   • HAMMER TOE REPAIR  09/2014    Hammertoe Operation (Each Toe), 10/2014   • HAMMER TOE REPAIR Left 10/3/2017    Procedure: Left second third and fourth distal interphalangeal joint resection with flexor tenotomy;  Surgeon: Mulugeta Lira MD;  Location: [facility];  Service:    • HERNIA REPAIR      incisional   • JEJUNOSTOMY      Laparoscopic   • JEJUNOSTOMY      tube removal    • KNEE SURGERY Bilateral 1967, 1973, 1981   • PATELLA SURGERY Left     removed   • PILONIDAL CYST / SINUS EXCISION     • [place] TOTAL KNEE ARTHROPLASTY Right 3/26/2018    Procedure: TOTAL KNEE ARTHROPLASTY;  Surgeon: Renny Solis MD;  Location: [facility] OR;  Service: Orthopedics   • PROSTATECTOMY  2010   • PYLOROPLASTY     • SPINAL FUSION  02/1998    C 5,6   • TONSILLECTOMY  1955   • TONSILLECTOMY     • UPPER GASTROINTESTINAL ENDOSCOPY  12/15/2014    LA Grade D esophagitis, Pardo's, HH, multiple duodenal ulcers   • VENTRAL/INCISIONAL HERNIA REPAIR N/A 4/14/2016    Procedure: VENTRAL/INCISIONAL HERNIA REPAIR, open, with mesh, and component separation;  Surgeon: Darren Rivas MD;  Location: [facility] OR;  Service:        SOCIAL HISTORY  Social History     Socioeconomic History   •  Marital status:      Spouse name: Lena   • Number of children: 0   • Years of education: College   • Highest education level: Not on file   Occupational History   • Occupation: Retired      Comment: Retired 2014   Tobacco Use   • Smoking status: Former Smoker     Packs/day: 2.00     Years: 3.00     Pack years: 6.00     Types: Cigarettes     Last attempt to quit: 1974     Years since quittin.4   • Smokeless tobacco: Never Used   Substance and Sexual Activity   • Alcohol use: Yes     Frequency: 2-3 times a week     Comment: 2-3 x per week   • Drug use: No   • Sexual activity: Defer   Social History Narrative    self-employed        FAMILY HISTORY  Family History   Problem Relation Age of Onset   • Cerebral aneurysm Mother         cerebral artery aneurysm ( age 56)   • Prostate cancer Brother 68   • Anxiety disorder Father    • Suicide Attempts Father          of suicide   • Cancer Father         bladder   • No Known Problems Brother    • Colon cancer Neg Hx    • Esophageal cancer Neg Hx    • Dementia Neg Hx          **Dragon Disclaimer:   Much of this encounter note is an electronic transcription/translation of spoken language to printed text. The electronic translation of spoken language may permit erroneous, or at times, nonsensical words or phrases to be inadvertently transcribed. Although I have reviewed the note for such errors, some may still exist.       Template created by Moose Bolton MD

## 2019-06-07 ENCOUNTER — TELEPHONE (OUTPATIENT)
Dept: INTERNAL MEDICINE | Age: 69
End: 2019-06-07

## 2019-06-07 PROBLEM — E53.8 B12 DEFICIENCY: Status: ACTIVE | Noted: 2019-06-07

## 2019-06-07 LAB
FOLATE SERPL-MCNC: 10.7 NG/ML (ref 4.78–24.2)
T4 FREE SERPL-MCNC: 1.18 NG/DL (ref 0.93–1.7)
TSH SERPL DL<=0.005 MIU/L-ACNC: 2.05 MIU/ML (ref 0.27–4.2)
VIT B12 SERPL-MCNC: <150 PG/ML (ref 211–946)

## 2019-06-07 NOTE — TELEPHONE ENCOUNTER
----- Message from Brandin Bolton MD sent at 6/7/2019  9:57 AM EDT -----  Call patient with his test result(s) and mail the results to him if MyChart is NOT active.    B12 level is extremely low. This is probably the cause of his symptoms. Start B12 injections:   Start B12 injections 1000 mcg SC/IM daily x 7 days; then once weekly for 1 month; then every 2 weeks for 1 month; then monthly going forward (Dx: B12 deficiency)    (REMINDER: Update med list, maintain dx association, and update change on CURRENT ASSESSMENT/PLAN)

## 2019-06-07 NOTE — PROGRESS NOTES
Call patient with his test result(s) and mail the results to him if MyChart is NOT active.    B12 level is extremely low. This is probably the cause of his symptoms. Start B12 injections:   Start B12 injections 1000 mcg SC/IM daily x 7 days; then once weekly for 1 month; then every 2 weeks for 1 month; then monthly going forward (Dx: B12 deficiency)    (REMINDER: Update med list, maintain dx association, and update change on CURRENT ASSESSMENT/PLAN)

## 2019-06-07 NOTE — ASSESSMENT & PLAN NOTE
Pt to Start B12 injections 1000 mcg SC/IM daily x 7 days; then once weekly for 1 month; then every 2 weeks for 1 month; then monthly going forward per Dr Bolton. MARTIN

## 2019-06-11 ENCOUNTER — CLINICAL SUPPORT (OUTPATIENT)
Dept: INTERNAL MEDICINE | Age: 69
End: 2019-06-11

## 2019-06-11 DIAGNOSIS — E53.8 B12 DEFICIENCY: Primary | ICD-10-CM

## 2019-06-11 PROCEDURE — 96372 THER/PROPH/DIAG INJ SC/IM: CPT | Performed by: INTERNAL MEDICINE

## 2019-06-11 RX ORDER — CYANOCOBALAMIN 1000 UG/ML
1000 INJECTION, SOLUTION INTRAMUSCULAR; SUBCUTANEOUS
Status: DISCONTINUED | OUTPATIENT
Start: 2019-06-11 | End: 2022-03-04

## 2019-06-11 RX ADMIN — CYANOCOBALAMIN 1000 MCG: 1000 INJECTION, SOLUTION INTRAMUSCULAR; SUBCUTANEOUS at 08:56

## 2019-06-12 ENCOUNTER — CLINICAL SUPPORT (OUTPATIENT)
Dept: INTERNAL MEDICINE | Age: 69
End: 2019-06-12

## 2019-06-12 ENCOUNTER — ANTICOAGULATION VISIT (OUTPATIENT)
Dept: INTERNAL MEDICINE | Age: 69
End: 2019-06-12

## 2019-06-12 LAB — INR PPP: 1.9 (ref 2–3)

## 2019-06-12 PROCEDURE — 96372 THER/PROPH/DIAG INJ SC/IM: CPT | Performed by: INTERNAL MEDICINE

## 2019-06-12 RX ADMIN — CYANOCOBALAMIN 1000 MCG: 1000 INJECTION, SOLUTION INTRAMUSCULAR; SUBCUTANEOUS at 09:02

## 2019-06-13 ENCOUNTER — CLINICAL SUPPORT (OUTPATIENT)
Dept: INTERNAL MEDICINE | Age: 69
End: 2019-06-13

## 2019-06-13 PROCEDURE — 96372 THER/PROPH/DIAG INJ SC/IM: CPT | Performed by: INTERNAL MEDICINE

## 2019-06-13 RX ADMIN — CYANOCOBALAMIN 1000 MCG: 1000 INJECTION, SOLUTION INTRAMUSCULAR; SUBCUTANEOUS at 14:56

## 2019-06-14 ENCOUNTER — CLINICAL SUPPORT (OUTPATIENT)
Dept: INTERNAL MEDICINE | Age: 69
End: 2019-06-14

## 2019-06-14 PROCEDURE — 96372 THER/PROPH/DIAG INJ SC/IM: CPT | Performed by: INTERNAL MEDICINE

## 2019-06-14 RX ADMIN — CYANOCOBALAMIN 1000 MCG: 1000 INJECTION, SOLUTION INTRAMUSCULAR; SUBCUTANEOUS at 08:38

## 2019-06-17 ENCOUNTER — CLINICAL SUPPORT (OUTPATIENT)
Dept: INTERNAL MEDICINE | Age: 69
End: 2019-06-17

## 2019-06-17 PROCEDURE — 96372 THER/PROPH/DIAG INJ SC/IM: CPT | Performed by: INTERNAL MEDICINE

## 2019-06-17 RX ADMIN — CYANOCOBALAMIN 1000 MCG: 1000 INJECTION, SOLUTION INTRAMUSCULAR; SUBCUTANEOUS at 09:13

## 2019-06-21 ENCOUNTER — OFFICE VISIT (OUTPATIENT)
Dept: INTERNAL MEDICINE | Age: 69
End: 2019-06-21

## 2019-06-21 VITALS
HEIGHT: 77 IN | TEMPERATURE: 98.2 F | WEIGHT: 219 LBS | BODY MASS INDEX: 25.86 KG/M2 | HEART RATE: 66 BPM | SYSTOLIC BLOOD PRESSURE: 128 MMHG | DIASTOLIC BLOOD PRESSURE: 72 MMHG | OXYGEN SATURATION: 97 %

## 2019-06-21 DIAGNOSIS — G62.9 PERIPHERAL POLYNEUROPATHY: Primary | Chronic | ICD-10-CM

## 2019-06-21 DIAGNOSIS — E53.8 B12 DEFICIENCY: Chronic | ICD-10-CM

## 2019-06-21 PROCEDURE — 99213 OFFICE O/P EST LOW 20 MIN: CPT | Performed by: INTERNAL MEDICINE

## 2019-06-21 NOTE — ASSESSMENT & PLAN NOTE
This is a new problem to this examiner.  Started on B12 injection and the peripheral neuropathy symptoms appear to be improving. Continue same. Consider checking Ab to intrinsic factor on next lab.

## 2019-06-21 NOTE — PROGRESS NOTES
[facility] [place] INTERNAL MEDICINE  RICHARDSON HEALY M.D.      Jose FITCH Hurtado / 69 y.o. / male  06/21/2019      ASSESSMENT & PLAN:    Problem List Items Addressed This Visit        High    Peripheral polyneuropathy - Primary (Chronic)    Overview     + severe B12 deficiency (6/7/19)         Current Assessment & Plan     Started on B12 injections. Improving.          B12 deficiency (Chronic)    Current Assessment & Plan     This is a new problem to this examiner.  Started on B12 injection and the peripheral neuropathy symptoms appear to be improving. Continue same. Consider checking Ab to intrinsic factor on next lab.          Relevant Medications    cyanocobalamin injection 1,000 mcg        No orders of the defined types were placed in this encounter.    No orders of the defined types were placed in this encounter.      Summary/Discussion:  ·     Next Appointment with me: 9/24/2019    Return in about 3 months (around 9/21/2019) for Reassess chronic medical problems.    ____________________________________________________________________    MEDICATIONS  Current Outpatient Medications   Medication Sig Dispense Refill   • ferrous sulfate 325 (65 FE) MG tablet Take 1 tablet by mouth Daily With Breakfast. 30 tablet 0   • PARoxetine (PAXIL) 30 MG tablet Take 1 tablet by mouth Every Morning. 90 tablet 1   • pramipexole (MIRAPEX) 1.5 MG tablet Take 1 tablet by mouth 2 (Two) Times a Day. 180 tablet 1   • tamsulosin (FLOMAX) 0.4 MG capsule 24 hr capsule Take 1 capsule by mouth Every Night.     • traZODone (DESYREL) 50 MG tablet Take 1 tablet by mouth Every Night. 30 tablet 0   • warfarin (COUMADIN) 5 MG tablet Take 1 tablet by mouth Daily. 90 tablet 2     Current Facility-Administered Medications   Medication Dose Route Frequency Provider Last Rate Last Dose   • cyanocobalamin injection 1,000 mcg  1,000 mcg Intramuscular Q28 Days Brandin Healy MD   1,000 mcg at 06/17/19 0913        VITALS    Visit Vitals  /72   Pulse 66   Temp 98.2 °F  "(36.8 °C)   Ht 195.6 cm (77.01\")   Wt 99.3 kg (219 lb)   SpO2 97%   BMI 25.96 kg/m²       BP Readings from Last 3 Encounters:   06/21/19 128/72   06/06/19 114/66   05/23/19 132/67     Wt Readings from Last 3 Encounters:   06/21/19 99.3 kg (219 lb)   06/06/19 103 kg (228 lb)   05/23/19 103 kg (227 lb)      Body mass index is 25.96 kg/m².    CC:  Main reason(s) for today's visit: Peripheral polyneuropathy and Hypertension      HPI:     Noted to have severe B12 deficiency and he has been started on B12 replacement and notices improvement in peripheral neuropathy symptoms of paresthesia, numbness, and pain.     Patient Care Team:  Brandin Bolton MD as PCP - General (Internal Medicine)  Tapan, George THORPE II, MD as Consulting Physician (Hematology and Oncology)  Jose Inman MD as Consulting Physician (Urology)  Mulugeta Millan MD as Consulting Physician (Gastroenterology)  Seth Randhawa MD as Consulting Physician (Ophthalmology)  ____________________________________________________________________    REVIEW OF SYSTEMS    Review of Systems  Neuro improving paresthesia, numbness, pain   Psych stable     PHYSICAL EXAMINATION    Physical Exam  Alert, looks better   Normal thought and judgment     REVIEWED DATA:    Labs:     Lab Results   Component Value Date    AYKQURIY74 <150 (L) 06/06/2019      Lab Results   Component Value Date    TIBC 430 04/15/2019    FERRITIN 29.70 (L) 04/15/2019      Lab Results   Component Value Date     04/19/2019    K 4.4 04/19/2019    CALCIUM 8.6 04/19/2019    AST 23 04/18/2019    ALT 19 04/18/2019    BUN 20 04/19/2019    CREATININE 0.95 04/19/2019    CREATININE 1.04 04/18/2019    CREATININE 1.12 04/18/2019    EGFRIFNONA 78 04/19/2019    EGFRIFAFRI 97 05/10/2018       Lab Results   Component Value Date    GLUCOSE 88 04/19/2019    GLUCOSE 103 (H) 04/18/2019    GLUCOSE 104 (H) 04/18/2019       Lab Results   Component Value Date    LDL 92 05/10/2018     (H) 01/30/2018    LDL " 98 01/26/2017    HDL 45 05/10/2018    HDL 55 01/30/2018    HDL 67 (H) 01/26/2017    TRIG 79 05/10/2018    TRIG 74 01/30/2018    TRIG 47 01/26/2017       Lab Results   Component Value Date    TSH 2.050 06/06/2019    FREET4 1.18 06/06/2019       Lab Results   Component Value Date    WBC 3.63 04/19/2019    HGB 11.3 (L) 04/19/2019    HGB 10.7 (L) 04/18/2019    HGB 11.3 (L) 04/18/2019     04/19/2019       Lab Results   Component Value Date    GLUCOSEU Negative 03/15/2018    BLOODU Negative 03/15/2018    NITRITEU Negative 03/15/2018    LEUKOCYTESUR Negative 03/15/2018        Imaging:         Medical Tests:         Summary of old records / correspondence / consultant report:         Request outside records:         ALLERGIES  No Known Allergies     PFSH:     The following portions of the patient's history were reviewed and updated as appropriate: Allergies / Current Medications / Past Medical History / Surgical History / Social History / Family History    PROBLEM LIST   Patient Active Problem List   Diagnosis   • Adenocarcinoma of esophagus (CMS/HCC)   • Adenomatous polyp of colon   • Malignant neoplasm of prostate (CMS/HCC)   • RLS (restless legs syndrome)   • Depression   • Hypertension   • Anti-phospholipid syndrome (CMS/HCC)   • Anemia   • Insomnia due to other mental disorder   • Peripheral polyneuropathy   • B12 deficiency       PAST MEDICAL HISTORY  Past Medical History:   Diagnosis Date   • Anemia    • Anti-phospholipid syndrome (CMS/HCC)     On lifelong anticoagulation therapy   • Bladder disorder     LEAKAGE   ON MED  wers pads   • Community acquired pneumonia     HISTORY OF IN 2014   • Deep venous thrombosis (CMS/HCC) 2006, 2008    Left lower extremity multiple   • Depression    • Esophageal carcinoma (CMS/HCC) 12/31/2014    had chemo and radiation prior surgery   • Hemorrhoids    • HH (hiatus hernia)    • History of atrial fibrillation 2015    ONE EPISODE WHILE HOSPITALIZED   • History of kidney stones     • History of nephrolithiasis    • History of pancreatitis     PT STATES MANY YEARS AGO   • History of radiation therapy    • Hypertension    • Long-term (current) use of anticoagulants, INR goal 2.0-3.0    • Malignant neoplasm of prostate (CMS/HCC)    • Other hyperlipidemia 1/30/2018 January 30, 2018 lipid panel risk 12.8%   • Restless legs syndrome    • Squamous carcinoma     on the head       SURGICAL HISTORY  Past Surgical History:   Procedure Laterality Date   • APPENDECTOMY  1950   • BRONCHOSCOPY      (Diagnostic)   • CATARACT EXTRACTION Bilateral 2014   • COLONOSCOPY  12/15/2014    Complete / Description: EH, IH, torts, stool, follow-up colonoscopy due in 5 years.   • COLONOSCOPY N/A 6/13/2017    non-thrombosed external hemorrhoids, normal examined ileum, IH   • COLONOSCOPY N/A 2/26/2019    Procedure: COLONOSCOPY with Cold Polypectomy;  Surgeon: Mulugeta Millan MD;  Location: [facility] ENDOSCOPY;  Service: Gastroenterology   • CYSTOSCOPY W/ LASER LITHOTRIPSY     • ENDOSCOPY N/A 6/13/2017    Procedure: ESOPHAGOGASTRODUODENOSCOPY WITH COLD BIOPSY;  Surgeon: Lake Gonzalez MD;  Location: [facility] ENDOSCOPY;  Service:    • ESOPHAGECTOMY      April 2015, stage IIB esophageal carcinoma, sub-total resection.   • ESOPHAGECTOMY      Esophagectomy Subtotal [place] Joe Procedure   • EXCISION LESION  08/2012    Removal of Squamous Cell CA on Head   • HAMMER TOE REPAIR  09/2014    Hammertoe Operation (Each Toe), 10/2014   • HAMMER TOE REPAIR Left 10/3/2017    Procedure: Left second third and fourth distal interphalangeal joint resection with flexor tenotomy;  Surgeon: Mulugeta Lira MD;  Location: [facility] [place];  Service:    • HERNIA REPAIR      incisional   • JEJUNOSTOMY      Laparoscopic   • JEJUNOSTOMY      tube removal    • KNEE SURGERY Bilateral 1967, 1973, 1981   • PATELLA SURGERY Left     removed   • PILONIDAL CYST / SINUS EXCISION     • [place] TOTAL KNEE ARTHROPLASTY Right 3/26/2018    Procedure:  TOTAL KNEE ARTHROPLASTY;  Surgeon: Renny Solis MD;  Location: [facility];  Service: Orthopedics   • PROSTATECTOMY     • PYLOROPLASTY     • SPINAL FUSION  1998    C 5,6   • TONSILLECTOMY     • TONSILLECTOMY     • UPPER GASTROINTESTINAL ENDOSCOPY  12/15/2014    LA Grade D esophagitis, Pardo's, HH, multiple duodenal ulcers   • VENTRAL/INCISIONAL HERNIA REPAIR N/A 2016    Procedure: VENTRAL/INCISIONAL HERNIA REPAIR, open, with mesh, and component separation;  Surgeon: Darren Rivas MD;  Location: [facility] OR;  Service:        SOCIAL HISTORY  Social History     Socioeconomic History   • Marital status:      Spouse name: Lena   • Number of children: 0   • Years of education: College   • Highest education level: Not on file   Occupational History   • Occupation: Retired      Comment: Retired    Tobacco Use   • Smoking status: Former Smoker     Packs/day: 2.00     Years: 3.00     Pack years: 6.00     Types: Cigarettes     Last attempt to quit: 1974     Years since quittin.4   • Smokeless tobacco: Never Used   Substance and Sexual Activity   • Alcohol use: Yes     Frequency: 2-3 times a week     Comment: 2-3 x per week   • Drug use: No   • Sexual activity: Defer   Social History Narrative    self-employed        FAMILY HISTORY  Family History   Problem Relation Age of Onset   • Cerebral aneurysm Mother         cerebral artery aneurysm ( age 56)   • Prostate cancer Brother 68   • Anxiety disorder Father    • Suicide Attempts Father          of suicide   • Cancer Father         bladder   • No Known Problems Brother    • Colon cancer Neg Hx    • Esophageal cancer Neg Hx    • Dementia Neg Hx            **Dragon Disclaimer:   Much of this encounter note is an electronic transcription/translation of spoken language to printed text. The electronic translation of spoken language may permit erroneous, or at times, nonsensical words or phrases  to be inadvertently transcribed. Although I have reviewed the note for such errors, some may still exist.       Template created by Moose Bolton MD

## 2019-06-25 ENCOUNTER — OFFICE VISIT (OUTPATIENT)
Dept: ORTHOPEDIC SURGERY | Facility: CLINIC | Age: 69
End: 2019-06-25

## 2019-06-25 VITALS — HEIGHT: 77 IN | WEIGHT: 210 LBS | TEMPERATURE: 98.6 F | BODY MASS INDEX: 24.79 KG/M2

## 2019-06-25 DIAGNOSIS — Z96.651 HISTORY OF TOTAL KNEE ARTHROPLASTY, RIGHT: Primary | ICD-10-CM

## 2019-06-25 DIAGNOSIS — R29.898 WEAKNESS OF BOTH LOWER EXTREMITIES: ICD-10-CM

## 2019-06-25 PROCEDURE — 99213 OFFICE O/P EST LOW 20 MIN: CPT | Performed by: ORTHOPAEDIC SURGERY

## 2019-06-25 PROCEDURE — 73562 X-RAY EXAM OF KNEE 3: CPT | Performed by: ORTHOPAEDIC SURGERY

## 2019-06-25 NOTE — PROGRESS NOTES
Patient: Jose Hurtado  YOB: 1950 69 y.o. male  Medical Record Number: [id number]    Chief Complaints:   Chief Complaint   Patient presents with   • Right Knee - Follow-up, Pain       History of Present Illness:Jose Hurtado is a 69 y.o. male who presents for follow-up of right total knee overall knee doing well having some issues with walking feelings of instability he notes his left leg turns and and he tends to be hunched over at the lumbar spine.  He does have some low back pain as well as pain which I radiates down both legs additionally he has problems with strength and stability of the legs.    Allergies: No Known Allergies    Medications:   Current Outpatient Medications   Medication Sig Dispense Refill   • ferrous sulfate 325 (65 FE) MG tablet Take 1 tablet by mouth Daily With Breakfast. 30 tablet 0   • PARoxetine (PAXIL) 30 MG tablet Take 1 tablet by mouth Every Morning. 90 tablet 1   • pramipexole (MIRAPEX) 1.5 MG tablet Take 1 tablet by mouth 2 (Two) Times a Day. 180 tablet 1   • tamsulosin (FLOMAX) 0.4 MG capsule 24 hr capsule Take 1 capsule by mouth Every Night.     • traZODone (DESYREL) 50 MG tablet Take 1 tablet by mouth Every Night. 30 tablet 0   • warfarin (COUMADIN) 5 MG tablet Take 1 tablet by mouth Daily. 90 tablet 2     Current Facility-Administered Medications   Medication Dose Route Frequency Provider Last Rate Last Dose   • cyanocobalamin injection 1,000 mcg  1,000 mcg Intramuscular Q28 Days Brandin Bolton MD   1,000 mcg at 06/17/19 0913         The following portions of the patient's history were reviewed and updated as appropriate: allergies, current medications, past family history, past medical history, past social history, past surgical history and problem list.    Review of Systems:   A 14 point review of systems was performed. All systems negative except pertinent positives/negative listed in HPI above    Physical Exam:   Vitals:    06/25/19 1433   Temp: 98.6 °F (37 °C)  "  Weight: 95.3 kg (210 lb)   Height: 195.6 cm (77\")       General: A and O x 3, ASA, NAD    SCLERA:    Normal    DENTITION:   Normal  He walks with a relatively slow short stride length gait there is some flexion of the lumbar spine there is moderately ataxic component to the gait.  His knees show overall good range of motion no ligamentous instability he does not like extension.  Quad strength is 4 out of 5 bilaterally.    Radiology:  Xrays 3views right knee  (ap,lateral, sunrise) were ordered and reviewed for evaluation of knee pain demonstratinga well positioned knee replacement without evidence of wear, loosening or osteolysis  todays xrays were compared to previous xrays and demonstrate no change    Assessment/Plan:  Bilateral leg weakness and instability difficulty with leg control with gait.  I am concerned based on his gait pattern that he has lumbar radiculopathy causing leg weakness and instability.  I see no problems with hip knee or ankle instability on exam or x-ray  "

## 2019-06-28 DIAGNOSIS — F99 INSOMNIA DUE TO OTHER MENTAL DISORDER: ICD-10-CM

## 2019-06-28 DIAGNOSIS — F51.05 INSOMNIA DUE TO OTHER MENTAL DISORDER: ICD-10-CM

## 2019-07-01 RX ORDER — TRAZODONE HYDROCHLORIDE 50 MG/1
TABLET ORAL
Qty: 90 TABLET | Refills: 3 | Status: SHIPPED | OUTPATIENT
Start: 2019-07-01 | End: 2019-11-22

## 2019-07-03 ENCOUNTER — TELEPHONE (OUTPATIENT)
Dept: INTERNAL MEDICINE | Age: 69
End: 2019-07-03

## 2019-07-03 NOTE — TELEPHONE ENCOUNTER
Pt would like to know if he supposed to keep taking the iron he was prescribed on his last ER visit at [facility] from 4/16/2019

## 2019-07-09 ENCOUNTER — CLINICAL SUPPORT (OUTPATIENT)
Dept: INTERNAL MEDICINE | Age: 69
End: 2019-07-09

## 2019-07-09 ENCOUNTER — CLINICAL SUPPORT (OUTPATIENT)
Dept: ORTHOPEDIC SURGERY | Facility: CLINIC | Age: 69
End: 2019-07-09

## 2019-07-09 VITALS — BODY MASS INDEX: 24.87 KG/M2 | WEIGHT: 210.6 LBS | HEIGHT: 77 IN | TEMPERATURE: 98.4 F

## 2019-07-09 DIAGNOSIS — M17.12 PRIMARY OSTEOARTHRITIS OF LEFT KNEE: Primary | ICD-10-CM

## 2019-07-09 PROCEDURE — 20610 DRAIN/INJ JOINT/BURSA W/O US: CPT | Performed by: NURSE PRACTITIONER

## 2019-07-09 PROCEDURE — 96372 THER/PROPH/DIAG INJ SC/IM: CPT | Performed by: INTERNAL MEDICINE

## 2019-07-09 RX ORDER — LIDOCAINE HYDROCHLORIDE 10 MG/ML
2 INJECTION, SOLUTION EPIDURAL; INFILTRATION; INTRACAUDAL; PERINEURAL
Status: COMPLETED | OUTPATIENT
Start: 2019-07-09 | End: 2019-07-09

## 2019-07-09 RX ORDER — METHYLPREDNISOLONE ACETATE 80 MG/ML
80 INJECTION, SUSPENSION INTRA-ARTICULAR; INTRALESIONAL; INTRAMUSCULAR; SOFT TISSUE
Status: COMPLETED | OUTPATIENT
Start: 2019-07-09 | End: 2019-07-09

## 2019-07-09 RX ORDER — FERROUS SULFATE 325(65) MG
325 TABLET ORAL
Qty: 90 TABLET | Refills: 0 | Status: SHIPPED | OUTPATIENT
Start: 2019-07-09 | End: 2019-09-14 | Stop reason: SDUPTHER

## 2019-07-09 RX ADMIN — METHYLPREDNISOLONE ACETATE 80 MG: 80 INJECTION, SUSPENSION INTRA-ARTICULAR; INTRALESIONAL; INTRAMUSCULAR; SOFT TISSUE at 09:50

## 2019-07-09 RX ADMIN — LIDOCAINE HYDROCHLORIDE 2 ML: 10 INJECTION, SOLUTION EPIDURAL; INFILTRATION; INTRACAUDAL; PERINEURAL at 09:50

## 2019-07-09 RX ADMIN — CYANOCOBALAMIN 1000 MCG: 1000 INJECTION, SOLUTION INTRAMUSCULAR; SUBCUTANEOUS at 08:34

## 2019-07-09 NOTE — PROGRESS NOTES
7/9/2019    Jose Hurtado is here today for worsening knee pain. Pt has undergone injection of the knee in the past with good resolution of symptoms. Pt is requesting a repeat injection.     KNEE Injection Procedure Note:    Large Joint Arthrocentesis: L knee  Date/Time: 7/9/2019 9:50 AM  Consent given by: patient  Site marked: site marked  Timeout: Immediately prior to procedure a time out was called to verify the correct patient, procedure, equipment, support staff and site/side marked as required   Supporting Documentation  Indications: pain   Procedure Details  Location: knee - L knee  Preparation: Patient was prepped and draped in the usual sterile fashion  Needle size: 22 G  Approach: anterolateral  Medications administered: 80 mg methylPREDNISolone acetate 80 MG/ML; 2 mL lidocaine PF 1% 1 %  Patient tolerance: patient tolerated the procedure well with no immediate complications      Prior to injection risks were discussed including pain, infection, elevated blood sugar.  Patient verbalized understanding would like to proceed with injection    At the conclusion of the injection I discussed the importance of continued quad strengthening exercises on a daily basis. I will see the patient back if the symptoms should fail to improve or worsen.    Luba Osuna APRN  7/9/2019

## 2019-07-10 ENCOUNTER — HOSPITAL ENCOUNTER (OUTPATIENT)
Dept: MRI IMAGING | Facility: HOSPITAL | Age: 69
Discharge: HOME | End: 2019-07-10
Admitting: ORTHOPAEDIC SURGERY

## 2019-07-10 DIAGNOSIS — R29.898 WEAKNESS OF BOTH LOWER EXTREMITIES: ICD-10-CM

## 2019-07-10 PROCEDURE — 72148 MRI LUMBAR SPINE W/O DYE: CPT

## 2019-07-15 ENCOUNTER — OFFICE VISIT (OUTPATIENT)
Dept: GASTROENTEROLOGY | Facility: CLINIC | Age: 69
End: 2019-07-15

## 2019-07-15 VITALS
HEIGHT: 77 IN | WEIGHT: 209 LBS | BODY MASS INDEX: 24.68 KG/M2 | TEMPERATURE: 98.7 F | DIASTOLIC BLOOD PRESSURE: 74 MMHG | SYSTOLIC BLOOD PRESSURE: 132 MMHG

## 2019-07-15 DIAGNOSIS — R19.4 CHANGE IN BOWEL HABITS: Primary | ICD-10-CM

## 2019-07-15 DIAGNOSIS — R10.30 LOWER ABDOMINAL PAIN: ICD-10-CM

## 2019-07-15 DIAGNOSIS — R14.3 EXCESSIVE GAS: ICD-10-CM

## 2019-07-15 DIAGNOSIS — D12.6 ADENOMATOUS POLYP OF COLON, UNSPECIFIED PART OF COLON: ICD-10-CM

## 2019-07-15 PROCEDURE — 99214 OFFICE O/P EST MOD 30 MIN: CPT | Performed by: NURSE PRACTITIONER

## 2019-07-15 NOTE — PROGRESS NOTES
Chief Complaint   Patient presents with   • Change in bowel       Jose Hurtado is a  69 y.o. male here for a follow up visit for change in bowel habits.     HPI  70 yo m presents today for follow up visit for change in bowel habits. He is a patient of Dr. Millan. He was last seen in the office on 2/15/19. He underwent Colonoscopy on 2/26/19 that showed NTEH, NBIH and 1 adenomatous colon polyp. He admits he continues to have issues with constipation for about a week then he will have a day or two of diarrhea. He does not take any fiber or probiotics. He denies using anything for the constipation. He denies any dysphagia, reflux, N&V, rectal bleeding or melena. His weight has dropped about 10 lbs in the last month. He admits he hasn't felt as hungry when he feels constipated and when he feels constipated he will have lots of gas and some lower abd cramping.       Past Medical History:   Diagnosis Date   • Anemia    • Anti-phospholipid syndrome (CMS/HCC)     On lifelong anticoagulation therapy   • Bladder disorder     LEAKAGE   ON MED  wers pads   • Community acquired pneumonia     HISTORY OF IN 2014   • Deep venous thrombosis (CMS/HCC) 2006, 2008    Left lower extremity multiple   • Depression    • Esophageal carcinoma (CMS/HCC) 12/31/2014    had chemo and radiation prior surgery   • Hemorrhoids    • HH (hiatus hernia)    • History of atrial fibrillation 2015    ONE EPISODE WHILE HOSPITALIZED   • History of kidney stones    • History of nephrolithiasis    • History of pancreatitis     PT STATES MANY YEARS AGO   • History of radiation therapy    • Hypertension    • Long-term (current) use of anticoagulants, INR goal 2.0-3.0    • Malignant neoplasm of prostate (CMS/HCC)    • Other hyperlipidemia 1/30/2018 January 30, 2018 lipid panel risk 12.8%   • Restless legs syndrome    • 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   • COLONOSCOPY N/A 6/13/2017    non-thrombosed external hemorrhoids, normal examined ileum, IH   • COLONOSCOPY N/A 2/26/2019    Procedure: COLONOSCOPY with Cold Polypectomy;  Surgeon: Mulugeta Millan MD;  Location: [facility] ENDOSCOPY;  Service: Gastroenterology   • CYSTOSCOPY W/ LASER LITHOTRIPSY     • ENDOSCOPY N/A 6/13/2017    Procedure: ESOPHAGOGASTRODUODENOSCOPY WITH COLD BIOPSY;  Surgeon: Lake Gonzalez MD;  Location: [facility] ENDOSCOPY;  Service:    • ESOPHAGECTOMY      April 2015, stage IIB esophageal carcinoma, sub-total resection.   • ESOPHAGECTOMY      Esophagectomy Subtotal [place] Procedure   • EXCISION LESION  08/2012    Removal of Squamous Cell CA on Head   • HAMMER TOE REPAIR  09/2014    Hammertoe Operation (Each Toe), 10/2014   • HAMMER TOE REPAIR Left 10/3/2017    Procedure: Left second third and fourth distal interphalangeal joint resection with flexor tenotomy;  Surgeon: Mulugeta Lira MD;  Location: [facility] OR OSC;  Service:    • HERNIA REPAIR      incisional   • JEJUNOSTOMY      Laparoscopic   • JEJUNOSTOMY      tube removal    • KNEE SURGERY Bilateral 1967, 1973, 1981   • PATELLA SURGERY Left     removed   • PILONIDAL CYST / SINUS EXCISION     • AL TOTAL KNEE ARTHROPLASTY Right 3/26/2018    Procedure: TOTAL KNEE ARTHROPLASTY;  Surgeon: Renny Solis MD;  Location: [facility] MAIN OR;  Service: Orthopedics   • PROSTATECTOMY  2010   • PYLOROPLASTY     • SPINAL FUSION  02/1998    C 5,6   • TONSILLECTOMY  1955   • TONSILLECTOMY     • UPPER GASTROINTESTINAL ENDOSCOPY  12/15/2014    LA Grade D esophagitis, Pardo's, HH, multiple duodenal ulcers   • VENTRAL/INCISIONAL HERNIA REPAIR N/A 4/14/2016    Procedure: VENTRAL/INCISIONAL HERNIA REPAIR, open, with mesh, and component separation;  Surgeon: Darren Rivas MD;   Location: [facility] OR;  Service:        Scheduled Meds:    cyanocobalamin 1,000 mcg Intramuscular Q28 Days       Continuous Infusions:     PRN Meds:.    No Known Allergies    Social History     Socioeconomic History   • Marital status:      Spouse name: Lena   • Number of children: 0   • Years of education: College   • Highest education level: Not on file   Occupational History   • Occupation: Retired      Comment: Retired    Tobacco Use   • Smoking status: Former Smoker     Packs/day: 2.00     Years: 3.00     Pack years: 6.00     Types: Cigarettes     Last attempt to quit: 1974     Years since quittin.5   • Smokeless tobacco: Never Used   Substance and Sexual Activity   • Alcohol use: Yes     Frequency: 2-3 times a week     Comment: 2-3 x per week   • Drug use: No   • Sexual activity: Defer   Social History Narrative    self-employed        Family History   Problem Relation Age of Onset   • Cerebral aneurysm Mother         cerebral artery aneurysm ( age 56)   • Prostate cancer Brother 68   • Anxiety disorder Father    • Suicide Attempts Father          of suicide   • Cancer Father         bladder   • No Known Problems Brother    • Colon cancer Neg Hx    • Esophageal cancer Neg Hx    • Dementia Neg Hx        Review of Systems   Constitutional: Negative for appetite change, chills, diaphoresis, fatigue, fever and unexpected weight change.   HENT: Negative for nosebleeds, postnasal drip, sore throat, trouble swallowing and voice change.    Respiratory: Negative for cough, choking, chest tightness, shortness of breath and wheezing.    Cardiovascular: Negative for chest pain, palpitations and leg swelling.   Gastrointestinal: Positive for abdominal distention and constipation. Negative for abdominal pain, anal bleeding, blood in stool, diarrhea, nausea, rectal pain and vomiting.   Endocrine: Negative for polydipsia, polyphagia and polyuria.   Musculoskeletal:  Negative for gait problem.   Skin: Negative for rash and wound.   Allergic/Immunologic: Negative for food allergies.   Neurological: Negative for dizziness, speech difficulty and light-headedness.   Psychiatric/Behavioral: Negative for confusion, self-injury, sleep disturbance and suicidal ideas.       Vitals:    07/15/19 1425   BP: 132/74   Temp: 98.7 °F (37.1 °C)       Physical Exam   Constitutional: He is oriented to person, place, and time. He appears well-developed and well-nourished. He does not appear ill. No distress.   HENT:   Head: Normocephalic.   Eyes: Pupils are equal, round, and reactive to light.   Cardiovascular: Normal rate, regular rhythm and normal heart sounds.   Pulmonary/Chest: Effort normal and breath sounds normal.   Abdominal: Soft. Bowel sounds are normal. He exhibits no distension and no mass. There is no hepatosplenomegaly. There is no tenderness. There is no rebound and no guarding. No hernia.   Musculoskeletal: Normal range of motion.   Neurological: He is alert and oriented to person, place, and time.   Skin: Skin is warm and dry.   Psychiatric: He has a normal mood and affect. His speech is normal and behavior is normal. Judgment normal.       No images are attached to the encounter.    Assessment & Plan     1. Change in bowel habits    2. Lower abdominal pain    3. Excessive gas    4. Adenomatous polyp of colon, unspecified part of colon      Reviewed results of the colonoscopy with the patient. Still having change of bowel habits (from constipation x a week then diarrhea). Will have him add some daily fiber supplementation. Recommend he eats more fruits, veggies and whole grains. Increase water intake. Call office with update in 1-2 weeks. Follow up with me in 1 month.

## 2019-07-16 ENCOUNTER — OFFICE VISIT (OUTPATIENT)
Dept: ORTHOPEDIC SURGERY | Facility: CLINIC | Age: 69
End: 2019-07-16

## 2019-07-16 VITALS — BODY MASS INDEX: 24.68 KG/M2 | TEMPERATURE: 97.9 F | HEIGHT: 77 IN | WEIGHT: 209 LBS

## 2019-07-16 DIAGNOSIS — M79.604 PAIN IN BOTH LOWER EXTREMITIES: Primary | ICD-10-CM

## 2019-07-16 DIAGNOSIS — M79.605 PAIN IN BOTH LOWER EXTREMITIES: Primary | ICD-10-CM

## 2019-07-16 PROCEDURE — 99213 OFFICE O/P EST LOW 20 MIN: CPT | Performed by: ORTHOPAEDIC SURGERY

## 2019-07-16 NOTE — PROGRESS NOTES
"Patient: Jose Hurtado  YOB: 1950 69 y.o. male  Medical Record Number: [id number]    Chief Complaints:   Chief Complaint   Patient presents with   • Lumbar Spine - Follow-up       History of Present Illness:Jose Hurtado is a 69 y.o. male who presents for follow-up of bilateral leg weakness and pain.  He had an MRI and is here for follow-up he still has persistent anterior leg pain.    Allergies: No Known Allergies    Medications:   Current Outpatient Medications   Medication Sig Dispense Refill   • ferrous sulfate 325 (65 FE) MG tablet Take 1 tablet by mouth Daily With Breakfast. 90 tablet 0   • PARoxetine (PAXIL) 30 MG tablet Take 1 tablet by mouth Every Morning. 90 tablet 1   • pramipexole (MIRAPEX) 1.5 MG tablet Take 1 tablet by mouth 2 (Two) Times a Day. 180 tablet 1   • tamsulosin (FLOMAX) 0.4 MG capsule 24 hr capsule Take 1 capsule by mouth Every Night.     • traZODone (DESYREL) 50 MG tablet TAKE ONE TABLET BY MOUTH ONCE NIGHTLY 90 tablet 3   • warfarin (COUMADIN) 5 MG tablet Take 1 tablet by mouth Daily. 90 tablet 2     Current Facility-Administered Medications   Medication Dose Route Frequency Provider Last Rate Last Dose   • cyanocobalamin injection 1,000 mcg  1,000 mcg Intramuscular Q28 Days Brandin Bolton MD   1,000 mcg at 07/09/19 0834         The following portions of the patient's history were reviewed and updated as appropriate: allergies, current medications, past family history, past medical history, past social history, past surgical history and problem list.    Review of Systems:   A 14 point review of systems was performed. All systems negative except pertinent positives/negative listed in HPI above    Physical Exam:   Vitals:    07/16/19 1007   Temp: 97.9 °F (36.6 °C)   Weight: 94.8 kg (209 lb)   Height: 195.6 cm (77.01\")       General: A and O x 3, ASA, NAD    SCLERA:    Normal    DENTITION:   Normal  Walks with a stooped gait.  Knees are well aligned no effusion no " instability  Radiology: MRI of lumbar spine possibly consistent with tethered cord also some degenerative lower lumbar stenosis     Aassessment/Plan:  Leg pain and weakness MRI findings possible tethered cord I would think it possible that he is having weakness and pain from his lower lumbar findings.  I am going to have him see Dr. Flores for further management and would defer to him as it this point I have no knee or hip issues which I think are giving rise to his gait pattern.

## 2019-07-30 ENCOUNTER — CLINICAL SUPPORT (OUTPATIENT)
Dept: INTERNAL MEDICINE | Age: 69
End: 2019-07-30

## 2019-07-30 DIAGNOSIS — E53.8 B12 DEFICIENCY: Primary | Chronic | ICD-10-CM

## 2019-07-30 PROCEDURE — 96372 THER/PROPH/DIAG INJ SC/IM: CPT | Performed by: INTERNAL MEDICINE

## 2019-07-30 RX ADMIN — CYANOCOBALAMIN 1000 MCG: 1000 INJECTION, SOLUTION INTRAMUSCULAR; SUBCUTANEOUS at 08:22

## 2019-08-20 ENCOUNTER — TELEPHONE (OUTPATIENT)
Dept: INTERNAL MEDICINE | Age: 69
End: 2019-08-20

## 2019-08-20 NOTE — TELEPHONE ENCOUNTER
Pt Pharmacy is asking for a refill of Bumex 2mg for patient. Please advise, patient has not been on medication since 08.03.18.

## 2019-08-20 NOTE — TELEPHONE ENCOUNTER
Denied. As I do not have this listed as active medication.   Please call patient to inquire about this medication.

## 2019-08-21 ENCOUNTER — TELEPHONE (OUTPATIENT)
Dept: SLEEP MEDICINE | Facility: HOSPITAL | Age: 69
End: 2019-08-21

## 2019-08-21 ENCOUNTER — CONSULT (OUTPATIENT)
Dept: ORTHOPEDIC SURGERY | Facility: CLINIC | Age: 69
End: 2019-08-21

## 2019-08-21 VITALS — TEMPERATURE: 97.7 F | BODY MASS INDEX: 25.98 KG/M2 | HEIGHT: 77 IN | WEIGHT: 220 LBS

## 2019-08-21 DIAGNOSIS — M54.50 LUMBAR SPINE PAIN: Primary | ICD-10-CM

## 2019-08-21 PROCEDURE — 72100 X-RAY EXAM L-S SPINE 2/3 VWS: CPT | Performed by: ORTHOPAEDIC SURGERY

## 2019-08-21 PROCEDURE — 99214 OFFICE O/P EST MOD 30 MIN: CPT | Performed by: ORTHOPAEDIC SURGERY

## 2019-08-21 NOTE — PROGRESS NOTES
New patient or new problem visit    Chief Complaint   Patient presents with   • Lumbar Spine - Pain       HPI: He complains of lumbar back pain which is chronic in nature and ongoing for many months no history of trauma.  He had a knee replacement early spring and still had a bit of right leg pain following this but mostly back pain.  A brace was tried at some point in the past.  He is seen today at request of Dr. Solis    [facility]: See chart- reviewed.  He has undergone patellectomy on the left and total knee replacement on the right    Review of Systems   Constitutional: Negative for chills, fever and unexpected weight change.   HENT: Negative for trouble swallowing and voice change.    Eyes: Negative for visual disturbance.   Respiratory: Negative for cough and shortness of breath.    Cardiovascular: Negative for chest pain and leg swelling (BLE).   Gastrointestinal: Negative for abdominal pain, nausea and vomiting.   Endocrine: Negative for cold intolerance and heat intolerance.   Genitourinary: Positive for difficulty urinating, frequency and testicular pain. Negative for urgency.   Skin: Negative for rash and wound.   Allergic/Immunologic: Negative for immunocompromised state.   Neurological: Positive for weakness (kamran feet). Negative for numbness.   Hematological: Does not bruise/bleed easily.   Psychiatric/Behavioral: Negative for dysphoric mood. The patient is not nervous/anxious.        PE: Constitutional: Vital signs above-noted.  Awake, alert and oriented    Psychiatric: Affect and insight do not appear grossly disturbed.    Pulmonary: Breathing is unlabored, color is good.    Skin: Warm, dry and normal turgor    Cardiac: Pedal pulses intact.  Bilateral edema in the lower extremity.    Eyesight and hearing appear adequate for examination purposes      Musculoskeletal:  There is some tenderness to percussion and palpation of the spine. Motion appears stiff.  Posture is unremarkable to coronal and sagittal  inspection.    The skin about the area is intact.  There is no palpable or visible deformity.  There is no local spasm.       Neurologic:   Reflexes are absent in the patellae and achilles.   Motor function is undisturbed in quadriceps, EHL, and gastrocnemius   sensation appears symmetrically intact to light touch.  In the bilateral lower extremities there is no evidence of atrophy.   Clonus is absent..  Gait appears undisturbed.  SLR test negative      MEDICAL DECISION MAKING    XRAY: Plain film x-rays of the lumbar spine show very good lordotic posture but multilevel degenerative mild change.  Minimal scoliotic change in the AP view.  MRI scan does show a somewhat low conus for a male but I see no evidence of tethered cord.  Indeed multilevel disc degeneration probably makes his spine a bit shorter than it used to be.    Other: n/a    Impression: There is inconclusive evidence for tethered cord which in any event does not correlate with his presentation.  I do not think there is any indication for further imaging but if he worsens a myelogram and CT scan might settle the question definitively.  In any event he is not growing indeed shrinking and unlikely to be stretching his nerve roots.  With regard to the back physical therapy should help    Plan: Plan physical therapy and PRN follow-up.

## 2019-08-21 NOTE — TELEPHONE ENCOUNTER
Patient called into sleep center this afternoon. Patient had complaints of not liking his current mask, stated he would like to be shown something different. After looking at patients usage he is only wearing the device for about 1.23hrs and has a leak of 73.8 with an ahi of 4.2. Tech will see if can get an order to do a mask fit at [facility] as patient wants something done ASAP and did not want to wait to come in for appointment time. MAB

## 2019-08-23 ENCOUNTER — TELEPHONE (OUTPATIENT)
Dept: ONCOLOGY | Facility: HOSPITAL | Age: 69
End: 2019-08-23

## 2019-08-23 ENCOUNTER — LAB (OUTPATIENT)
Dept: OTHER | Facility: HOSPITAL | Age: 69
End: 2019-08-23

## 2019-08-23 ENCOUNTER — OFFICE VISIT (OUTPATIENT)
Dept: ONCOLOGY | Facility: CLINIC | Age: 69
End: 2019-08-23

## 2019-08-23 VITALS
WEIGHT: 218.6 LBS | HEART RATE: 73 BPM | TEMPERATURE: 97.8 F | HEIGHT: 76 IN | BODY MASS INDEX: 26.62 KG/M2 | OXYGEN SATURATION: 100 % | SYSTOLIC BLOOD PRESSURE: 137 MMHG | RESPIRATION RATE: 16 BRPM | DIASTOLIC BLOOD PRESSURE: 75 MMHG

## 2019-08-23 DIAGNOSIS — D50.9 IRON DEFICIENCY ANEMIA, UNSPECIFIED IRON DEFICIENCY ANEMIA TYPE: ICD-10-CM

## 2019-08-23 DIAGNOSIS — C15.9 ADENOCARCINOMA OF ESOPHAGUS (HCC): ICD-10-CM

## 2019-08-23 DIAGNOSIS — C15.9 ADENOCARCINOMA OF ESOPHAGUS (HCC): Primary | ICD-10-CM

## 2019-08-23 LAB
BASOPHILS # BLD AUTO: 0.02 10*3/MM3 (ref 0–0.2)
BASOPHILS NFR BLD AUTO: 0.6 % (ref 0–1.5)
DEPRECATED RDW RBC AUTO: 50.8 FL (ref 37–54)
EOSINOPHIL # BLD AUTO: 0.18 10*3/MM3 (ref 0–0.4)
EOSINOPHIL NFR BLD AUTO: 5 % (ref 0.3–6.2)
ERYTHROCYTE [DISTWIDTH] IN BLOOD BY AUTOMATED COUNT: 15.1 % (ref 12.3–15.4)
FERRITIN SERPL-MCNC: 65.4 NG/ML (ref 30–400)
HCT VFR BLD AUTO: 31.7 % (ref 37.5–51)
HGB BLD-MCNC: 10.4 G/DL (ref 13–17.7)
HGB RETIC QN AUTO: 31.3 PG (ref 29.8–36.1)
IMM GRANULOCYTES # BLD AUTO: 0.01 10*3/MM3 (ref 0–0.05)
IMM GRANULOCYTES NFR BLD AUTO: 0.3 % (ref 0–0.5)
IMM RETICS NFR: 12.1 % (ref 3–15.8)
IRON 24H UR-MRATE: 45 MCG/DL (ref 59–158)
IRON SATN MFR SERPL: 14 % (ref 20–50)
LYMPHOCYTES # BLD AUTO: 0.95 10*3/MM3 (ref 0.7–3.1)
LYMPHOCYTES NFR BLD AUTO: 26.6 % (ref 19.6–45.3)
MCH RBC QN AUTO: 30.2 PG (ref 26.6–33)
MCHC RBC AUTO-ENTMCNC: 32.8 G/DL (ref 31.5–35.7)
MCV RBC AUTO: 92.2 FL (ref 79–97)
MONOCYTES # BLD AUTO: 0.4 10*3/MM3 (ref 0.1–0.9)
MONOCYTES NFR BLD AUTO: 11.2 % (ref 5–12)
NEUTROPHILS # BLD AUTO: 2.01 10*3/MM3 (ref 1.7–7)
NEUTROPHILS NFR BLD AUTO: 56.3 % (ref 42.7–76)
NRBC BLD AUTO-RTO: 0 /100 WBC (ref 0–0.2)
PLATELET # BLD AUTO: 151 10*3/MM3 (ref 140–450)
PMV BLD AUTO: 9.2 FL (ref 6–12)
RBC # BLD AUTO: 3.44 10*6/MM3 (ref 4.14–5.8)
RETICS/RBC NFR AUTO: 1.06 % (ref 0.7–1.9)
TIBC SERPL-MCNC: 331 MCG/DL (ref 298–536)
TRANSFERRIN SERPL-MCNC: 222 MG/DL (ref 200–360)
WBC NRBC COR # BLD: 3.57 10*3/MM3 (ref 3.4–10.8)

## 2019-08-23 PROCEDURE — 85046 RETICYTE/HGB CONCENTRATE: CPT | Performed by: INTERNAL MEDICINE

## 2019-08-23 PROCEDURE — 99214 OFFICE O/P EST MOD 30 MIN: CPT | Performed by: INTERNAL MEDICINE

## 2019-08-23 PROCEDURE — 84466 ASSAY OF TRANSFERRIN: CPT | Performed by: INTERNAL MEDICINE

## 2019-08-23 PROCEDURE — 36415 COLL VENOUS BLD VENIPUNCTURE: CPT

## 2019-08-23 PROCEDURE — 85025 COMPLETE CBC W/AUTO DIFF WBC: CPT | Performed by: INTERNAL MEDICINE

## 2019-08-23 PROCEDURE — 83540 ASSAY OF IRON: CPT | Performed by: INTERNAL MEDICINE

## 2019-08-23 PROCEDURE — 82728 ASSAY OF FERRITIN: CPT | Performed by: INTERNAL MEDICINE

## 2019-08-23 NOTE — PROGRESS NOTES
Subjective .     REASONS FOR FOLLOWUP:  Esophageal cancer, cytopenias     HISTORY OF PRESENT ILLNESS:  The patient is a 69 y.o. year old male  who is here for follow-up with the above-mentioned history.    Having some back pain.  Saw Dr. Lassiter of orthopedic surgery and some physical therapy was recommended.    Denies pain elsewhere.  Denies problems eating or weight loss or nausea or shortness of breath.    Has been taking oral iron 1 tablet/day through PCP.  Having no GI side effects.  Has been on B12 supplementation as well.    Past Medical History:   Diagnosis Date   • Anemia    • Anti-phospholipid syndrome (CMS/HCC)     On lifelong anticoagulation therapy   • Bladder disorder     LEAKAGE   ON MED  wers pads   • Community acquired pneumonia     HISTORY OF IN 2014   • Deep venous thrombosis (CMS/HCC) 2006, 2008    Left lower extremity multiple   • Depression    • Esophageal carcinoma (CMS/HCC) 12/31/2014    had chemo and radiation prior surgery   • Hemorrhoids    • HH (hiatus hernia)    • History of atrial fibrillation 2015    ONE EPISODE WHILE HOSPITALIZED   • History of kidney stones    • History of nephrolithiasis    • History of pancreatitis     PT STATES MANY YEARS AGO   • History of radiation therapy    • Hypertension    • Long-term (current) use of anticoagulants, INR goal 2.0-3.0    • Malignant neoplasm of prostate (CMS/HCC)    • Other hyperlipidemia 1/30/2018 January 30, 2018 lipid panel risk 12.8%   • Restless legs syndrome    • Squamous carcinoma     on the head     Past Surgical History:   Procedure Laterality Date   • APPENDECTOMY  1950   • BRONCHOSCOPY      (Diagnostic)   • CATARACT EXTRACTION Bilateral 2014   • COLONOSCOPY  12/15/2014    Complete / Description: EH, IH, torts, stool, follow-up colonoscopy due in 5 years.   • COLONOSCOPY N/A 6/13/2017    non-thrombosed external hemorrhoids, normal examined ileum, IH   • COLONOSCOPY N/A 2/26/2019    Procedure: COLONOSCOPY with Cold Polypectomy;   Surgeon: Mulugeta Millan MD;  Location: [facility] ENDOSCOPY;  Service: Gastroenterology   • CYSTOSCOPY W/ LASER LITHOTRIPSY     • ENDOSCOPY N/A 6/13/2017    Procedure: ESOPHAGOGASTRODUODENOSCOPY WITH COLD BIOPSY;  Surgeon: Lake Gonzalez MD;  Location: [facility] ENDOSCOPY;  Service:    • ESOPHAGECTOMY      April 2015, stage IIB esophageal carcinoma, sub-total resection.   • ESOPHAGECTOMY      Esophagectomy Subtotal [place] Procedure   • EXCISION LESION  08/2012    Removal of Squamous Cell CA on Head   • HAMMER TOE REPAIR  09/2014    Hammertoe Operation (Each Toe), 10/2014   • HAMMER TOE REPAIR Left 10/3/2017    Procedure: Left second third and fourth distal interphalangeal joint resection with flexor tenotomy;  Surgeon: Mulugeta Lira MD;  Location: [facility] OR OSC;  Service:    • HERNIA REPAIR      incisional   • JEJUNOSTOMY      Laparoscopic   • JEJUNOSTOMY      tube removal    • KNEE SURGERY Bilateral 1967, 1973, 1981   • PATELLA SURGERY Left     removed   • PILONIDAL CYST / SINUS EXCISION     • OH TOTAL KNEE ARTHROPLASTY Right 3/26/2018    Procedure: TOTAL KNEE ARTHROPLASTY;  Surgeon: Renny Solis MD;  Location: [facility] MAIN OR;  Service: Orthopedics   • PROSTATECTOMY  2010   • PYLOROPLASTY     • SPINAL FUSION  02/1998    C 5,6   • TONSILLECTOMY  1955   • TONSILLECTOMY     • UPPER GASTROINTESTINAL ENDOSCOPY  12/15/2014    LA Grade D esophagitis, Pardo's, HH, multiple duodenal ulcers   • VENTRAL/INCISIONAL HERNIA REPAIR N/A 4/14/2016    Procedure: VENTRAL/INCISIONAL HERNIA REPAIR, open, with mesh, and component separation;  Surgeon: Darren Rivas MD;  Location: [facility] MAIN OR;  Service:        HEMATOLOGIC/ONCOLOGIC HISTORY:  (History from previous dates can be found in the separate document.)    MEDICATIONS    Current Outpatient Medications:   •  ferrous sulfate 325 (65 FE) MG tablet, Take 1 tablet by mouth Daily With Breakfast., Disp: 90 tablet, Rfl: 0  •  PARoxetine (PAXIL) 30 MG  tablet, Take 1 tablet by mouth Every Morning., Disp: 90 tablet, Rfl: 1  •  pramipexole (MIRAPEX) 1.5 MG tablet, Take 1 tablet by mouth 2 (Two) Times a Day., Disp: 180 tablet, Rfl: 1  •  tamsulosin (FLOMAX) 0.4 MG capsule 24 hr capsule, Take 1 capsule by mouth Every Night., Disp: , Rfl:   •  traZODone (DESYREL) 50 MG tablet, TAKE ONE TABLET BY MOUTH ONCE NIGHTLY, Disp: 90 tablet, Rfl: 3  •  warfarin (COUMADIN) 5 MG tablet, Take 1 tablet by mouth Daily., Disp: 90 tablet, Rfl: 2    Current Facility-Administered Medications:   •  cyanocobalamin injection 1,000 mcg, 1,000 mcg, Intramuscular, Q28 Days, Brandin Bolton MD, 1,000 mcg at 19 0822    ALLERGIES:   No Known Allergies    SOCIAL HISTORY:       Social History     Socioeconomic History   • Marital status:      Spouse name: Lena   • Number of children: 0   • Years of education: College   • Highest education level: Not on file   Occupational History   • Occupation: Retired      Comment: Retired    Tobacco Use   • Smoking status: Former Smoker     Packs/day: 2.00     Years: 3.00     Pack years: 6.00     Types: Cigarettes     Last attempt to quit: 1974     Years since quittin.6   • Smokeless tobacco: Never Used   Substance and Sexual Activity   • Alcohol use: Yes     Frequency: 2-3 times a week     Comment: 2-3 x per week   • Drug use: No   • Sexual activity: Defer   Social History Narrative    self-employed          FAMILY HISTORY:  Family History   Problem Relation Age of Onset   • Cerebral aneurysm Mother         cerebral artery aneurysm ( age 56)   • Prostate cancer Brother 68   • Anxiety disorder Father    • Suicide Attempts Father          of suicide   • Cancer Father         bladder   • No Known Problems Brother    • Colon cancer Neg Hx    • Esophageal cancer Neg Hx    • Dementia Neg Hx        REVIEW OF SYSTEMS:    Review of Systems   Constitutional: Negative for activity change.   HENT: Negative for  "nosebleeds and trouble swallowing.    Respiratory: Negative for shortness of breath and wheezing.    Cardiovascular: Negative for chest pain and palpitations.   Gastrointestinal: Negative for constipation and diarrhea.   Genitourinary: Negative for dysuria and hematuria.   Musculoskeletal: Positive for back pain. Negative for arthralgias and myalgias.   Neurological: Negative for seizures and syncope.   Hematological: Negative for adenopathy. Does not bruise/bleed easily.   Psychiatric/Behavioral: Negative for confusion.           Objective    Vitals:    08/23/19 1015   BP: 137/75   Pulse: 73   Resp: 16   Temp: 97.8 °F (36.6 °C)   SpO2: 100%   Weight: 99.2 kg (218 lb 9.6 oz)   Height: 192 cm (75.59\")  Comment: new ht.   PainSc: 0-No pain     Current Status 8/23/2019   ECOG score 0      PHYSICAL EXAM:    CONSTITUTIONAL:  Vital signs reviewed.  No distress, looks comfortable.  EYES:  Conjunctiva and lids unremarkable.  PERRLA  EARS,NOSE,MOUTH,THROAT:  Ears and nose appear unremarkable.  Lips, teeth, gums appear unremarkable.  RESPIRATORY:  Normal respiratory effort.  Lungs clear to auscultation bilaterally.  CARDIOVASCULAR:  Normal S1, S2.  No murmurs rubs or gallops.  No significant lower extremity edema.  GASTROINTESTINAL: Abdomen appears unremarkable.  Nontender.  No hepatomegaly.  No splenomegaly.  LYMPHATIC:  No cervical, supraclavicular, axillary lymphadenopathy.  SKIN:  Warm.  No rashes.  PSYCHIATRIC:  Normal judgment and insight.  Normal mood and affect.    RECENT LABS:        WBC   Date/Time Value Ref Range Status   08/23/2019 10:03 AM 3.57 3.40 - 10.80 10*3/mm3 Final   04/15/2019 12:31 PM 3.53 3.40 - 10.80 10*3/mm3 Final   04/16/2018 04:25 PM 4.23 (L) 4.5 - 11.0 10*3/uL Final     Hemoglobin   Date/Time Value Ref Range Status   08/23/2019 10:03 AM 10.4 (L) 13.0 - 17.7 g/dL Final   04/16/2018 04:25 PM 9.9 (L) 13.5 - 17.5 g/dL Final     Platelets   Date/Time Value Ref Range Status   08/23/2019 10:03  140 " - 450 10*3/mm3 Final   04/16/2018 04:25  140 - 440 10*3/uL Final       Assessment/Plan     ASSESSMENT:  There are no diagnoses linked to this encounter.    *Esophageal adenocarcinoma. Initially T2N0M0. After neoadjuvant carboplatin/Taxol with radiation, achieved a pathologic CR at resection, 4/24/2015.   · As per NCCN guidelines, plan CAT scans every 6 months x1 year, then every 6 to 9 months on years 2 and years 3. Defer any surveillance EGDs to Dr. Gonzalez if he feels they are appropriate.   · Also as per NCCN guidelines, plan H and P every 3 to 6 months on years 1 and years 2, then every 6 to 12 months' time on years 3 through years 5 and then annually.   · EGD 6/13/17 by Dr. Gonzalez: No evidence of recurrence.  · CT scan 3/2/18 (this completed 3 years of surveillance CT): No evidence of recurrence.  Plan no more surveillance CT.  Remission continues.    *Recurrent DVT, prior to cancer diagnosis.    · Dr. Bolton manages his Coumadin.   No clinical evidence of additional clotting    *Leukocytopenia, anemia, thrombocytopenia.    He had a white blood cell count in the upper 3s and a hemoglobin in the upper 12s with a normal platelet count prior to beginning chemotherapy. We will monitor this.   WBC and PLT normal.  HPV mildly low.    *Persistent cough.  He has seen Dr. Maher Sayied for this.    He did not complain of this today.    *Previously complained of emesis occurring 5 hours after eating on average once every month or 2.  No nausea otherwise.  Denies dysphagia or odynophagia.    Unchanged.  Occurring on average once every couple of months.  He did not complain of this today    *Iron deficiency anemia  · Hb mostly around 11  · On 4/15/2019, ferritin 29.7, 13% saturation.  Serum folate normal.  · On oral iron daily through PCP.    *Source of iron deficiency.  · Last colonoscopy 2/26/2019 by Dr. Millan.  Last EGD 6/13/2017 by Dr. Gonzalez.  · Suspect he has an absorption issue due to previous esophageal  surgery.    *B12 deficiency.  · On 6/6/2019, B12 <150  · On B12 injections through PCP.    PLAN:  · M.D. CBC 6 months  · No more surveillance CT scans.  · He does not have a port.  · Iron labs today to see if he would benefit from an increase in iron.  · He agrees if we need to increase his iron, we will likely make his follow-up sooner than 6 months    I reviewed and summarized records from PCP and GI    ADDENDUM:  Ferritin 65.  14% saturation.  Advised to increase iron up to 3 times per day if tolerated.  He will see me in roughly 2 months with iron labs 1 week prior

## 2019-08-23 NOTE — TELEPHONE ENCOUNTER
----- Message from Melly Salinas sent at 8/23/2019 12:05 PM EDT -----  [phone]    Saw Dr. Phelan this morning he was concerned that his HGB had dropped in the last couple of months.  Pt wants to know what that could mean.    Reviewed today's labs with Dr. Phelan, pt is iron deficient. Taking oral iron daily. Dr. Phelan would like pt to try taking oral iron twice a day for a few days, if able to tolerate increase to 3 times. Dr. Phelan would like pt to come in to see him in 2 months with lab work (CBC, ferritin, iron panel, reticulated HGB) 1 week before seeing Dr. Phelan. Attempted to call pt no answer, left VM to call back. Labs placed, message sent to scheduling.     Pt called back. Informed him of instructions above and scheduled appts. Pt v/u

## 2019-08-29 ENCOUNTER — TREATMENT (OUTPATIENT)
Dept: PHYSICAL THERAPY | Facility: CLINIC | Age: 69
End: 2019-08-29

## 2019-08-29 DIAGNOSIS — R29.898 WEAKNESS OF BOTH LEGS: ICD-10-CM

## 2019-08-29 DIAGNOSIS — R26.9 GAIT DISTURBANCE: ICD-10-CM

## 2019-08-29 DIAGNOSIS — G89.29 CHRONIC BILATERAL LOW BACK PAIN WITHOUT SCIATICA: Primary | ICD-10-CM

## 2019-08-29 DIAGNOSIS — M54.50 CHRONIC BILATERAL LOW BACK PAIN WITHOUT SCIATICA: Primary | ICD-10-CM

## 2019-08-29 PROCEDURE — 97140 MANUAL THERAPY 1/> REGIONS: CPT | Performed by: PHYSICAL THERAPIST

## 2019-08-29 PROCEDURE — 97161 PT EVAL LOW COMPLEX 20 MIN: CPT | Performed by: PHYSICAL THERAPIST

## 2019-08-29 PROCEDURE — 97110 THERAPEUTIC EXERCISES: CPT | Performed by: PHYSICAL THERAPIST

## 2019-08-29 NOTE — PROGRESS NOTES
Physical Therapy Initial Evaluation and Plan of Care      Patient: Jose Hurtado   : 1950  Diagnosis/ICD-10 Code:  Chronic bilateral low back pain without sciatica [M54.5, G89.29]  Referring practitioner: Fernando Flores MD    Subjective Evaluation    History of Present Illness  Mechanism of injury: Chronic back pain;  R TKR  SAw Dr. Flores. Multilevel disc degeneration.  I cant stand up straight or my back starts hurting. Limited ability to stand and walk.      Patient Occupation: retired  Pain  Quality: dull ache, discomfort and tight  Relieving factors: rest and change in position  Aggravating factors: ambulation and prolonged positioning  Progression: worsening    Social Support  Lives with: spouse    Diagnostic Tests  X-ray: abnormal    Treatments  Previous treatment: physical therapy (for knees)  Patient Goals  Patient goals for therapy: increased motion, increased strength, independence with ADLs/IADLs and decreased pain             Objective       Static Posture     Head  Forward.    Shoulders  Rounded.    Lumbar Spine   Decreased lordosis.     Knee   Genu valgus.     Active Range of Motion   Cervical/Thoracic Spine     Thoracic   Flexion: WFL  Extension: Active thoracic extension: 10% with pain  Left rotation: Active left thoracic rotation: 100%   Right rotation: Active right thoracic rotation: 75% with pain    Additional Active Range of Motion Details  Decreased segmental mobility L > R lumbar and  lower thoracic    Strength/Myotome Testing     Left Hip   Planes of Motion   Flexion: 4  Extension: 4-  Abduction: 4    Right Hip   Planes of Motion   Flexion: 5  Extension: 4  Abduction: 4+    Left Knee   Extension: 5    Right Knee   Extension: 5    Left Ankle/Foot   Dorsiflexion: 5    Right Ankle/Foot   Dorsiflexion: 5         Assessment & Plan     Assessment  Impairments: abnormal muscle tone, abnormal or restricted ROM, activity intolerance, impaired physical strength, lacks appropriate home  exercise program and pain with function  Assessment details: Pt is a good candidate for skilled PT intervention, jolly manual therapy for spinal joint mobility, alignment, postural restoration and core strengthening to restore functional AROM and strength to return to previous level of ADL's.  Prognosis: fair  Prognosis details: Short Term Goals: ( 4 weeks)  1.Pt to be independent with HEP  2. Pt to exhibit improved lumbar extension to 25% to allow for upright standing and walking posture  3. Pt to demonstrate proper lifting technique  4. Pt to improve L hip extension  strength to 4/5 to allow for improved standing/stairclimbing tolerance    Long Term Goals (8 weeks )  1. Pt to demonstrate ability to lift safely without LBP  2. Pt to exhibit 4/5 lower abdominal strength to allow for more strenuous daily activities  3. Pt to exhibit lumbar rotational AROM to 100% to allow for reaching and bending with min to no pain  4. Pt to score <25% on Back Index  Functional Limitations: walking, uncomfortable because of pain and standing  Plan  Therapy options: will be seen for skilled physical therapy services  Planned therapy interventions: abdominal trunk stabilization, body mechanics training, balance/weight-bearing training, flexibility, functional ROM exercises, home exercise program, joint mobilization, manual therapy, neuromuscular re-education, postural training, soft tissue mobilization, spinal/joint mobilization, stretching, strengthening and therapeutic activities  Frequency: 1x week  Duration in weeks: 8        Manual Therapy:    12     mins  28852;  Therapeutic Exercise:    12     mins  93319;     Neuromuscular Wil:        mins  06324;    Therapeutic Activity:          mins  86036;     Gait Training:           mins  82089;     Ultrasound:          mins  05762;    Electrical Stimulation:         mins  08376 ( );  Dry Needling          mins self-pay    Timed Treatment:   24   mins   Total Treatment:     54    mins    PT SIGNATURE: Caroline Vasquez, PT   [place] License # [id number]  DATE TREATMENT INITIATED: 8/29/2019    Medicare Initial Certification  Certification Period: 11/27/2019  I certify that the therapy services are furnished while this patient is under my care.  The services outlined above are required by this patient, and will be reviewed every 90 days.     PHYSICIAN: Fernando Flores MD      DATE:     Please sign and return via fax to [phone].. Thank you, [facility].

## 2019-08-30 ENCOUNTER — ANTICOAGULATION VISIT (OUTPATIENT)
Dept: INTERNAL MEDICINE | Age: 69
End: 2019-08-30

## 2019-08-30 ENCOUNTER — CLINICAL SUPPORT (OUTPATIENT)
Dept: INTERNAL MEDICINE | Age: 69
End: 2019-08-30

## 2019-08-30 LAB — INR PPP: 2.3 (ref 2–3)

## 2019-08-30 PROCEDURE — 96372 THER/PROPH/DIAG INJ SC/IM: CPT | Performed by: INTERNAL MEDICINE

## 2019-08-30 PROCEDURE — 36416 COLLJ CAPILLARY BLOOD SPEC: CPT | Performed by: INTERNAL MEDICINE

## 2019-08-30 PROCEDURE — 85610 PROTHROMBIN TIME: CPT | Performed by: INTERNAL MEDICINE

## 2019-08-30 RX ADMIN — CYANOCOBALAMIN 1000 MCG: 1000 INJECTION, SOLUTION INTRAMUSCULAR; SUBCUTANEOUS at 10:43

## 2019-09-04 ENCOUNTER — OFFICE VISIT (OUTPATIENT)
Dept: PHYSICAL THERAPY | Facility: CLINIC | Age: 69
End: 2019-09-04

## 2019-09-04 DIAGNOSIS — G89.29 CHRONIC BILATERAL LOW BACK PAIN WITHOUT SCIATICA: Primary | ICD-10-CM

## 2019-09-04 DIAGNOSIS — R29.898 WEAKNESS OF BOTH LEGS: ICD-10-CM

## 2019-09-04 DIAGNOSIS — R26.9 GAIT DISTURBANCE: ICD-10-CM

## 2019-09-04 DIAGNOSIS — M54.50 CHRONIC BILATERAL LOW BACK PAIN WITHOUT SCIATICA: Primary | ICD-10-CM

## 2019-09-04 PROCEDURE — 97110 THERAPEUTIC EXERCISES: CPT | Performed by: PHYSICAL THERAPIST

## 2019-09-04 PROCEDURE — 97140 MANUAL THERAPY 1/> REGIONS: CPT | Performed by: PHYSICAL THERAPIST

## 2019-09-04 PROCEDURE — 97530 THERAPEUTIC ACTIVITIES: CPT | Performed by: PHYSICAL THERAPIST

## 2019-09-04 NOTE — PROGRESS NOTES
Physical Therapy Daily Progress Note      Jose Hurtado reports: LBP present, difficulty straightening legs and self upright when walking.     Subjective     Objective   -increased dyspnea noted latter end of treadmill walking  -noted observed decreased difficulty sit to stand and with ambulation with proper posture and fully extended LE when coming from waiting room to therapy clinic.    See Exercise, Manual, and Modality Logs for complete treatment.   Exercise rationale/ pain free exercise performance  Anatomy and structure of affected musculature  Posture/Postural awareness  Lumbar support  Sleeping positions with pillows  Alternate exercise positions  Verbal/Tactile cues to ensure correct exercise performance/technique TA contraction/positioning as well as hold time.    Added:LTR/LTR stretch, Treadmill x 6 min(1.0-1.5 mph); B UE OH pulldowns with t band    - issued green t band home use    - O2 sats 96% post treadmill      Assessment/Plan  Noted increased dyspnea with treadmill activities, though O2 sats remain well above 90% post completion.  Objectively, he presents with decreased postural awareness and bent knee ambulation, though able to correct with verbal cues.  Able to progress exercise/activity without increased LB symptoms though evident tightness B hamstrings as well as core musculature(TA) weakness.  Positive response noted post manual stretching intervention this session.      Progress strengthening /stabilization /functional activity as symptoms allow.           Manual Therapy:     15    mins  81431;  Therapeutic Exercise:   18      mins  90646;     Neuromuscular Wil:        mins  58711;    Therapeutic Activity:   12       mins  36396;     Gait Trainin       mins  32235;     Ultrasound:          mins  51195;    Electrical Stimulation:         mins  46546 ( );      Timed Treatment:  51    mins   Total Treatment:    63    mins    Rey Sterling PTA    Physical Therapist Assistant [place]  [phone]

## 2019-09-11 ENCOUNTER — APPOINTMENT (OUTPATIENT)
Dept: SLEEP MEDICINE | Facility: HOSPITAL | Age: 69
End: 2019-09-11

## 2019-09-12 ENCOUNTER — OFFICE VISIT (OUTPATIENT)
Dept: SLEEP MEDICINE | Facility: HOSPITAL | Age: 69
End: 2019-09-12

## 2019-09-12 VITALS
DIASTOLIC BLOOD PRESSURE: 65 MMHG | HEART RATE: 84 BPM | WEIGHT: 220.4 LBS | SYSTOLIC BLOOD PRESSURE: 153 MMHG | OXYGEN SATURATION: 96 % | BODY MASS INDEX: 26.02 KG/M2 | HEIGHT: 77 IN

## 2019-09-12 DIAGNOSIS — F51.04 PSYCHOPHYSIOLOGICAL INSOMNIA: ICD-10-CM

## 2019-09-12 DIAGNOSIS — G25.81 RESTLESS LEGS SYNDROME (RLS): ICD-10-CM

## 2019-09-12 DIAGNOSIS — G47.33 OBSTRUCTIVE SLEEP APNEA: Primary | ICD-10-CM

## 2019-09-12 PROCEDURE — G0463 HOSPITAL OUTPT CLINIC VISIT: HCPCS

## 2019-09-12 NOTE — PROGRESS NOTES
[facility]  Telephone: [phone] / Fax: [phone] [place]  Telephone: [phone] / Fax: [phone] [place]    PCP: Brandin Bolton MD    Reason for visit: JAI f/u    Jose Hurtado is a 69 y.o.male  was last seen at [facility] sleep lab in May 2019. He changed the mask from FFM to nasal mask and it works well for him. He is using the CPAP device for about 1 hour nightly. His main concern today is insomnia. He takes 50mg of trazodone and falls asleep within 10-15min. However, he wakes up frequently at night. He takes Mirapex 1.5mg for RLS BID. It helps his symptoms. He feels that his sleep quality has improved in comparison to prior treatment, and excessive sleepiness/snoring is no longer an issue.  There is no complaint of aerophagia, excessive dry mouth or air leak.     SH- no tobacco, drinks 3-4 alc per week, 4-5 soda per day.    ROS-+nasal congestion, +shortness of breath. Rest is negative.    DME-[employer].     Current Medications:    Current Outpatient Medications:   •  ferrous sulfate 325 (65 FE) MG tablet, Take 1 tablet by mouth Daily With Breakfast., Disp: 90 tablet, Rfl: 0  •  PARoxetine (PAXIL) 30 MG tablet, Take 1 tablet by mouth Every Morning., Disp: 90 tablet, Rfl: 1  •  pramipexole (MIRAPEX) 1.5 MG tablet, Take 1 tablet by mouth 2 (Two) Times a Day., Disp: 180 tablet, Rfl: 1  •  tamsulosin (FLOMAX) 0.4 MG capsule 24 hr capsule, Take 1 capsule by mouth Every Night., Disp: , Rfl:   •  traZODone (DESYREL) 50 MG tablet, TAKE ONE TABLET BY MOUTH ONCE NIGHTLY, Disp: 90 tablet, Rfl: 3  •  warfarin (COUMADIN) 5 MG tablet, Take 1 tablet by mouth Daily., Disp: 90 tablet, Rfl: 2    Current Facility-Administered Medications:   •  cyanocobalamin injection 1,000 mcg, 1,000 mcg, Intramuscular, Q28 Days, Barndin Bolton MD, 1,000 mcg at 08/30/19 1043   also entered in Sleep Questionnaire    Patient  has a past medical history of Anemia, Anti-phospholipid syndrome (CMS/HCC), Bladder disorder,  "Community acquired pneumonia, Deep venous thrombosis (CMS/HCC) (2006, 2008), Depression, Esophageal carcinoma (CMS/HCC) (12/31/2014), Hemorrhoids, HH (hiatus hernia), History of atrial fibrillation (2015), History of kidney stones, History of nephrolithiasis, History of pancreatitis, History of radiation therapy, Hypertension, Long-term (current) use of anticoagulants, INR goal 2.0-3.0, Malignant neoplasm of prostate (CMS/HCC), Other hyperlipidemia (1/30/2018), Restless legs syndrome, and Squamous carcinoma.    I have reviewed the Past Medical History, Past Surgical History, Social History and Family History.    Vital Signs /65 (BP Location: Right arm, Patient Position: Sitting)   Pulse 84   Ht 195.6 cm (77\")   Wt 100 kg (220 lb 6.4 oz)   SpO2 96%   BMI 26.14 kg/m²  Body mass index is 26.14 kg/m².    General Alert and oriented. No acute distress noted   Pharynx/Throat Class IV Mallampati airway, large tongue, no evidence of redundant lateral pharyngeal tissue. No oral lesions. No thrush. Moist mucous membranes.   Head Normocephalic. Symmetrical. Atraumatic.    Nose No septal deviation. No drainage   Chest Wall Normal shape. Symmetric expansion with respiration. No tenderness.   Neck Trachea midline, no thyromegaly or adenopathy    Lungs Clear to auscultation bilaterally. No wheezes. No rhonchi. No rales. Respirations regular, even and unlabored.   Heart Regular rhythm and normal rate. Normal S1 and S2. No murmur   Abdomen Soft, non-tender and non-distended. Normal bowel sounds. No masses.   Extremities Moves all extremities well. No edema   Psychiatric Normal mood and affect.     Testing:  · Download 8/13/19-9/11/19- 96% use with average nightly use of 1 hour and 19 minutes on auto CPAP 12-15cm with average pressure of 12.7, AHI 3.7.      Study-Overnight split polysomnogram study- 11/2018  Diagnostic study from 10 PM to 11:35 PM.  Sleep efficiency 74.6% with 1.18 hours total sleep time.  Sleep " distribution showed absence of slow-wave sleep and REM sleep.  AHI index is 38.9 with RDI of 51.6.  This indicates severe obstructive sleep apnea.  Patient slept supine for the entire portion of the diagnostic study.  Due to absence of REM sleep severity may be underestimated.  Oxygen saturation remained above 88%.  Arousal index is 53 events an hour.  Patient had 6.56% time snoring.     Titration study from 11:35 PM to 5:15 AM.  Sleep efficiency was poor at 46.9% with 2.66 hours total sleep time.  Some slow-wave sleep and REM sleep was seen during the titration study.  AHI index improved with application of positive airway pressure device.  Oxygen saturation remained above 88%.  CPAP increased from 5-14 cm water pressure.  Maximum sleep at 6 cm water pressure.  However this is not adequate to completely correct the obstructive sleep apnea.  CPAP was titrated to a maximum of 14 cm water pressure.    Impression:  1. Obstructive sleep apnea    2. Psychophysiological insomnia    3. Restless legs syndrome (RLS)          Plan:  I advised him to increase CPAP use to at least 4 hours nightly to prevent complications. Sleep hygiene strategies were also reviewed: no alcohol, caffeine, no naps, reg sleep wake schedule, avoid meals/sugary snacks prior to bedtime, avoid bright light exposure in the evening hours. Continue PRN trazodone for insomnia and mirapex for RLS.  He feels that the machine has helped him overall to improve sleep quality. He benefits from its use.    Patient uses the CPAP device and benefits from its use in terms of reduction of hypersomnia and snoring.  AHI appears to be within adequate range. I reviewed download report and original sleep study report with the patient.     Weight loss will be strongly beneficial to reduce the severity of sleep-disordered breathing.  Caution during activities that require prolonged concentration is strongly advised if sleepiness returns. Changing of PAP supplies regularly  is important for effective use. Patient needs to change cushion on the mask or plugs on nasal pillows along with disposable filters once every month and change mask frame, tubing, headgear and Velcro straps every 6 months at the minimum.       Follow up with Dr. Sommers in 2 months to make sure compliance with CPAP improved.    Thank you for allowing me to participate in your patient's care.      FAYE Rivero  [facility]  Phone: [phone]      Part of this note may be an electronic transcription/translation of spoken language to printed text using the Dragon Dictation System.

## 2019-09-16 RX ORDER — FERROUS SULFATE 325(65) MG
TABLET ORAL
Qty: 90 TABLET | Refills: 0 | Status: SHIPPED | OUTPATIENT
Start: 2019-09-16 | End: 2019-11-08 | Stop reason: SDUPTHER

## 2019-09-24 ENCOUNTER — OFFICE VISIT (OUTPATIENT)
Dept: INTERNAL MEDICINE | Age: 69
End: 2019-09-24

## 2019-09-24 VITALS
TEMPERATURE: 97.7 F | HEIGHT: 77 IN | SYSTOLIC BLOOD PRESSURE: 130 MMHG | WEIGHT: 217 LBS | HEART RATE: 87 BPM | DIASTOLIC BLOOD PRESSURE: 58 MMHG | OXYGEN SATURATION: 99 % | BODY MASS INDEX: 25.62 KG/M2

## 2019-09-24 DIAGNOSIS — F99 INSOMNIA DUE TO OTHER MENTAL DISORDER: Chronic | ICD-10-CM

## 2019-09-24 DIAGNOSIS — E53.8 B12 DEFICIENCY: Chronic | ICD-10-CM

## 2019-09-24 DIAGNOSIS — G62.9 PERIPHERAL POLYNEUROPATHY: Chronic | ICD-10-CM

## 2019-09-24 DIAGNOSIS — Z23 ENCOUNTER FOR IMMUNIZATION: Primary | ICD-10-CM

## 2019-09-24 DIAGNOSIS — H91.93 BILATERAL HEARING LOSS, UNSPECIFIED HEARING LOSS TYPE: ICD-10-CM

## 2019-09-24 DIAGNOSIS — G25.81 RLS (RESTLESS LEGS SYNDROME): Chronic | ICD-10-CM

## 2019-09-24 DIAGNOSIS — F51.05 INSOMNIA DUE TO OTHER MENTAL DISORDER: Chronic | ICD-10-CM

## 2019-09-24 DIAGNOSIS — F32.9 MAJOR DEPRESSIVE DISORDER WITH CURRENT ACTIVE EPISODE, UNSPECIFIED DEPRESSION EPISODE SEVERITY, UNSPECIFIED WHETHER RECURRENT: Chronic | ICD-10-CM

## 2019-09-24 PROCEDURE — G0008 ADMIN INFLUENZA VIRUS VAC: HCPCS | Performed by: INTERNAL MEDICINE

## 2019-09-24 PROCEDURE — 99214 OFFICE O/P EST MOD 30 MIN: CPT | Performed by: INTERNAL MEDICINE

## 2019-09-24 PROCEDURE — 90653 IIV ADJUVANT VACCINE IM: CPT | Performed by: INTERNAL MEDICINE

## 2019-09-24 NOTE — PROGRESS NOTES
[facility] INTERNAL MEDICINE  RICHARDSON HEALY M.D.      Jose FITCH Hurtado / 69 y.o. / male  09/24/2019      CHIEF COMPLAINT     Peripheral polyneuropathy (3 month f/u); B12 deficiency; Depression; and Restless Legs Syndrome      ASSESSMENT & PLAN     Problem List Items Addressed This Visit        High    Depression (Chronic)    Overview     On paroxetine 30 mg qd.          Relevant Medications    PARoxetine (PAXIL) 30 MG tablet    traZODone (DESYREL) 50 MG tablet       Medium    RLS (restless legs syndrome) (Chronic)    Overview     Continue pramipexole 1.5 mg BID.          Current Assessment & Plan     Agree with increasing iron supplement as recommended by hematologist.          Relevant Medications    pramipexole (MIRAPEX) 1.5 MG tablet    Insomnia due to other mental disorder (Chronic)    Overview     Continue trazodone 50 mg qHS.          Relevant Medications    PARoxetine (PAXIL) 30 MG tablet    traZODone (DESYREL) 50 MG tablet    Peripheral polyneuropathy (Chronic)    Overview     + severe B12 deficiency (6/7/19)  Continue B12 injections monthly         B12 deficiency (Chronic)    Overview     Continue B12 injections monthly.          Relevant Medications    cyanocobalamin injection 1,000 mcg      Other Visit Diagnoses     Encounter for immunization    -  Primary    Relevant Orders    Fluzone High Dose =>65Years (Completed)    Bilateral hearing loss, unspecified hearing loss type        Relevant Orders    Ambulatory Referral to ENT (Otolaryngology)        Orders Placed This Encounter   Procedures   • Fluzone High Dose =>65Years   • Ambulatory Referral to ENT (Otolaryngology)     No orders of the defined types were placed in this encounter.      Summary/Discussion:  ·       Next Appointment with me: Visit date not found    Return in about 6 months (around 3/24/2020) for Reassess chronic medical problems.      MEDICATIONS     Current Outpatient Medications   Medication Sig Dispense Refill   • ferrous sulfate 325 (65 FE) MG  "tablet TAKE ONE TABLET BY MOUTH DAILY WITH BREAKFAST 90 tablet 0   • PARoxetine (PAXIL) 30 MG tablet Take 1 tablet by mouth Every Morning. 90 tablet 1   • pramipexole (MIRAPEX) 1.5 MG tablet Take 1 tablet by mouth 2 (Two) Times a Day. 180 tablet 1   • tamsulosin (FLOMAX) 0.4 MG capsule 24 hr capsule Take 1 capsule by mouth Every Night.     • traZODone (DESYREL) 50 MG tablet TAKE ONE TABLET BY MOUTH ONCE NIGHTLY 90 tablet 3   • warfarin (COUMADIN) 5 MG tablet Take 1 tablet by mouth Daily. 90 tablet 2     Current Facility-Administered Medications   Medication Dose Route Frequency Provider Last Rate Last Dose   • cyanocobalamin injection 1,000 mcg  1,000 mcg Intramuscular Q28 Days Brandin Bolton MD   1,000 mcg at 08/30/19 1043          VITAL SIGNS     Visit Vitals  /58   Pulse 87   Temp 97.7 °F (36.5 °C)   Ht 195.6 cm (77\")   Wt 98.4 kg (217 lb)   SpO2 99%   BMI 25.73 kg/m²       BP Readings from Last 3 Encounters:   09/24/19 130/58   09/12/19 153/65   08/23/19 137/75     Wt Readings from Last 3 Encounters:   09/24/19 98.4 kg (217 lb)   09/12/19 100 kg (220 lb 6.4 oz)   08/23/19 99.2 kg (218 lb 9.6 oz)      Body mass index is 25.73 kg/m².      HISTORY OF PRESENT ILLNESS     Reviewed chief complaint and details of complaint as documented by staff.     On B12 injection monthly for B12 deficiency and peripheral neuropathy.   On pramipexole for RLS, known history of iron deficiency, hematologist recommended increasing iron supplement for iron deficiency anemia.   Depression is overall stable on paroxetine 30 mg, takes trazodone 50 mg qHS for sleep.   On warfarin for APS. No bleeding problems.   Lab Results   Component Value Date    INR 2.30 (A) 08/30/2019    INR 1.90 (A) 06/12/2019    INR 2.90 (A) 05/13/2019    PROTIME 22.7 (H) 04/19/2019    PROTIME 22.4 (H) 04/18/2019    PROTIME 21.2 (H) 04/18/2019        Patient Care Team:  Brandin Bolton MD as PCP - General (Internal Medicine)  Code, George THORPE II, MD as Consulting " Physician (Hematology and Oncology)  Jose Inman MD as Consulting Physician (Urology)  Mulugeta Millan MD as Consulting Physician (Gastroenterology)  Seth Randhawa MD as Consulting Physician (Ophthalmology)      REVIEW OF SYSTEMS     Review of Systems  Constitutional neg  Resp neg  CV neg  GI neg bleeding or melena  Psych/neuro neg for change       PHYSICAL EXAMINATION     Physical Exam  Constitutional  No distress  Cardiovascular Rate  normal . Rhythm: regular . Heart sounds:  Normal  Psychiatric  Alert. Judgment intact. Thought content normal. Mood normal      REVIEWED DATA     Labs:     Lab Results   Component Value Date     04/19/2019    K 4.4 04/19/2019    CALCIUM 8.6 04/19/2019    AST 23 04/18/2019    ALT 19 04/18/2019    BUN 20 04/19/2019    CREATININE 0.95 04/19/2019    CREATININE 1.04 04/18/2019    CREATININE 1.12 04/18/2019    EGFRIFNONA 78 04/19/2019    EGFRIFAFRI 97 05/10/2018       Lab Results   Component Value Date     (H) 05/10/2018    GLU 91 04/16/2018       Lab Results   Component Value Date    LDL 92 05/10/2018     (H) 01/30/2018    LDL 98 01/26/2017    HDL 45 05/10/2018    HDL 55 01/30/2018    HDL 67 (H) 01/26/2017    TRIG 79 05/10/2018    TRIG 74 01/30/2018    TRIG 47 01/26/2017       Lab Results   Component Value Date    TSH 2.050 06/06/2019    FREET4 1.18 06/06/2019       Lab Results   Component Value Date    WBC 3.57 08/23/2019    HGB 10.4 (L) 08/23/2019    HGB 11.3 (L) 04/19/2019    HGB 10.7 (L) 04/18/2019     08/23/2019       Lab Results   Component Value Date    GLUCOSEU Negative 03/15/2018    BLOODU Negative 03/15/2018    NITRITEU Negative 03/15/2018    LEUKOCYTESUR Negative 03/15/2018       Imaging:         Medical Tests:         Summary of old records / correspondence / consultant report:         Request outside records:         ALLERGIES  No Known Allergies     PFSH:     The following portions of the patient's history were reviewed and  updated as appropriate: Allergies / Current Medications / Past Medical History / Surgical History / Social History / Family History    PROBLEM LIST   Patient Active Problem List   Diagnosis   • Adenocarcinoma of esophagus (CMS/HCC)   • Adenomatous polyp of colon   • Malignant neoplasm of prostate (CMS/HCC)   • RLS (restless legs syndrome)   • Depression   • Hypertension   • Anti-phospholipid syndrome (CMS/HCC)   • Anemia   • Insomnia due to other mental disorder   • Peripheral polyneuropathy   • B12 deficiency       PAST MEDICAL HISTORY  Past Medical History:   Diagnosis Date   • Anemia    • Anti-phospholipid syndrome (CMS/HCC)     On lifelong anticoagulation therapy   • Bladder disorder     LEAKAGE   ON MED  wers pads   • Community acquired pneumonia     HISTORY OF IN 2014   • Deep venous thrombosis (CMS/HCC) 2006, 2008    Left lower extremity multiple   • Depression    • Esophageal carcinoma (CMS/HCC) 12/31/2014    had chemo and radiation prior surgery   • Hemorrhoids    • HH (hiatus hernia)    • History of atrial fibrillation 2015    ONE EPISODE WHILE HOSPITALIZED   • History of kidney stones    • History of nephrolithiasis    • History of pancreatitis     PT STATES MANY YEARS AGO   • History of radiation therapy    • Hypertension    • Long-term (current) use of anticoagulants, INR goal 2.0-3.0    • Malignant neoplasm of prostate (CMS/HCC)    • Other hyperlipidemia 1/30/2018 January 30, 2018 lipid panel risk 12.8%   • Restless legs syndrome    • Squamous carcinoma     on the head       SURGICAL HISTORY  Past Surgical History:   Procedure Laterality Date   • APPENDECTOMY  1950   • BRONCHOSCOPY      (Diagnostic)   • CATARACT EXTRACTION Bilateral 2014   • COLONOSCOPY  12/15/2014    Complete / Description: EH, IH, torts, stool, follow-up colonoscopy due in 5 years.   • COLONOSCOPY N/A 6/13/2017    non-thrombosed external hemorrhoids, normal examined ileum, IH   • COLONOSCOPY N/A 2/26/2019    Procedure: COLONOSCOPY  with Cold Polypectomy;  Surgeon: Mulugeta Millan MD;  Location: [facility];  Service: Gastroenterology   • CYSTOSCOPY W/ LASER LITHOTRIPSY     • ENDOSCOPY N/A 6/13/2017    Procedure: ESOPHAGOGASTRODUODENOSCOPY WITH COLD BIOPSY;  Surgeon: Lake Gonzalez MD;  Location: [facility] END[facility];  Service:    • ESOPHAGECTOMY      April 2015, stage IIB esophageal carcinoma, sub-total resection.   • ESOPHAGECTOMY      Esophagectomy Subtotal Andrea Joe Procedure   • EXCISION LESION  08/2012    Removal of Squamous Cell CA on Head   • HAMMER TOE REPAIR  09/2014    Hammertoe Operation (Each Toe), 10/2014   • HAMMER TOE REPAIR Left 10/3/2017    Procedure: Left second third and fourth distal interphalangeal joint resection with flexor tenotomy;  Surgeon: Mulugeta Lira MD;  Location: [facility] OR OSC;  Service:    • HERNIA REPAIR      incisional   • JEJUNOSTOMY      Laparoscopic   • JEJUNOSTOMY      tube removal    • KNEE SURGERY Bilateral 1967, 1973, 1981   • PATELLA SURGERY Left     removed   • PILONIDAL CYST / SINUS EXCISION     • LA TOTAL KNEE ARTHROPLASTY Right 3/26/2018    Procedure: TOTAL KNEE ARTHROPLASTY;  Surgeon: Renny Solis MD;  Location: [facility] MA[facility] OR;  Service: Orthopedics   • PROSTATECTOMY  2010   • PYLOROPLASTY     • SPINAL FUSION  02/1998    C 5,6   • TONSILLECTOMY  1955   • TONSILLECTOMY     • UPPER GASTROINTESTINAL ENDOSCOPY  12/15/2014    LA Grade D esophagitis, Pardo's, HH, multiple duodenal ulcers   • VENTRAL/INCISIONAL HERNIA REPAIR N/A 4/14/2016    Procedure: VENTRAL/INCISIONAL HERNIA REPAIR, open, with mesh, and component separation;  Surgeon: Darren Rivas MD;  Location: [facility] MAIN OR;  Service:        SOCIAL HISTORY  Social History     Socioeconomic History   • Marital status:      Spouse name: Lena   • Number of children: 0   • Years of education: College   • Highest education level: Not on file   Occupational History   • Occupation: Retired Prosecuting       Comment: Retired 2014   Tobacco Use   • Smoking status: Former Smoker     Packs/day: 2.00     Years: 3.00     Pack years: 6.00     Types: Cigarettes     Last attempt to quit: 1974     Years since quittin.7   • Smokeless tobacco: Never Used   Substance and Sexual Activity   • Alcohol use: Yes     Frequency: 2-3 times a week     Comment: 2-3 x per week   • Drug use: No   • Sexual activity: Defer   Social History Narrative    self-employed        FAMILY HISTORY  Family History   Problem Relation Age of Onset   • Cerebral aneurysm Mother         cerebral artery aneurysm ( age 56)   • Prostate cancer Brother 68   • Anxiety disorder Father    • Suicide Attempts Father          of suicide   • Cancer Father         bladder   • No Known Problems Brother    • Colon cancer Neg Hx    • Esophageal cancer Neg Hx    • Dementia Neg Hx          **Dragon Disclaimer:   Much of this encounter note is an electronic transcription/translation of spoken language to printed text. The electronic translation of spoken language may permit erroneous, or at times, nonsensical words or phrases to be inadvertently transcribed. Although I have reviewed the note for such errors, some may still exist.       Template created by Moose Bolton MD

## 2019-09-26 ENCOUNTER — TREATMENT (OUTPATIENT)
Dept: PHYSICAL THERAPY | Facility: CLINIC | Age: 69
End: 2019-09-26

## 2019-09-26 DIAGNOSIS — G89.29 CHRONIC BILATERAL LOW BACK PAIN WITHOUT SCIATICA: Primary | ICD-10-CM

## 2019-09-26 DIAGNOSIS — R29.898 WEAKNESS OF BOTH LEGS: ICD-10-CM

## 2019-09-26 DIAGNOSIS — M54.50 CHRONIC BILATERAL LOW BACK PAIN WITHOUT SCIATICA: Primary | ICD-10-CM

## 2019-09-26 PROCEDURE — 97140 MANUAL THERAPY 1/> REGIONS: CPT | Performed by: PHYSICAL THERAPIST

## 2019-09-26 PROCEDURE — 97110 THERAPEUTIC EXERCISES: CPT | Performed by: PHYSICAL THERAPIST

## 2019-09-26 NOTE — PROGRESS NOTES
"Physical Therapy Daily Progress Note        Subjective     Jose Hurtado reports: Exercises helping my back. I need new ones that are a bit more challenging. LTR loosens me up in the morning    Objective   See Exercise, Manual, and Modality Logs for complete treatment.       Assessment/Plan  Pt able to do ab and bridge progression but stated, \"these are hard but not painful\".    Progress per Plan of Care           Manual Therapy:  8       mins  11157;  Therapeutic Exercise: 20      mins  23648;     Neuromuscular Wil:       mins  11614;    Therapeutic Activity:         mins  23013;     Gait Training:         mins  92607;     Ultrasound:         mins  68971;    Electrical Stimulation:        mins  69771 ( );  Dry Needling         mins self-pay    Timed Treatment:   28   mins   Total Treatment:     28   mins    Caroline Vasquez, PT  Physical Therapist  [place] License # [id number]  "

## 2019-09-30 ENCOUNTER — CLINICAL SUPPORT (OUTPATIENT)
Dept: INTERNAL MEDICINE | Age: 69
End: 2019-09-30

## 2019-09-30 DIAGNOSIS — E53.8 B12 DEFICIENCY: Primary | Chronic | ICD-10-CM

## 2019-09-30 DIAGNOSIS — D68.61 ANTI-PHOSPHOLIPID SYNDROME (HCC): Chronic | ICD-10-CM

## 2019-09-30 LAB — INR PPP: 4 (ref 2–3)

## 2019-09-30 PROCEDURE — 96372 THER/PROPH/DIAG INJ SC/IM: CPT | Performed by: INTERNAL MEDICINE

## 2019-09-30 PROCEDURE — 93793 ANTICOAG MGMT PT WARFARIN: CPT | Performed by: NURSE PRACTITIONER

## 2019-09-30 PROCEDURE — 36416 COLLJ CAPILLARY BLOOD SPEC: CPT | Performed by: NURSE PRACTITIONER

## 2019-09-30 PROCEDURE — 85610 PROTHROMBIN TIME: CPT | Performed by: NURSE PRACTITIONER

## 2019-09-30 RX ADMIN — CYANOCOBALAMIN 1000 MCG: 1000 INJECTION, SOLUTION INTRAMUSCULAR; SUBCUTANEOUS at 09:28

## 2019-10-07 DIAGNOSIS — F32.A DEPRESSION, UNSPECIFIED DEPRESSION TYPE: ICD-10-CM

## 2019-10-09 ENCOUNTER — TREATMENT (OUTPATIENT)
Dept: PHYSICAL THERAPY | Facility: CLINIC | Age: 69
End: 2019-10-09

## 2019-10-09 DIAGNOSIS — M54.50 CHRONIC BILATERAL LOW BACK PAIN WITHOUT SCIATICA: Primary | ICD-10-CM

## 2019-10-09 DIAGNOSIS — R29.898 WEAKNESS OF BOTH LEGS: ICD-10-CM

## 2019-10-09 DIAGNOSIS — G89.29 CHRONIC BILATERAL LOW BACK PAIN WITHOUT SCIATICA: Primary | ICD-10-CM

## 2019-10-09 PROCEDURE — 97110 THERAPEUTIC EXERCISES: CPT | Performed by: PHYSICAL THERAPIST

## 2019-10-09 PROCEDURE — 97140 MANUAL THERAPY 1/> REGIONS: CPT | Performed by: PHYSICAL THERAPIST

## 2019-10-09 RX ORDER — PAROXETINE 30 MG/1
TABLET, FILM COATED ORAL
Qty: 90 TABLET | Refills: 1 | OUTPATIENT
Start: 2019-10-09

## 2019-10-09 RX ORDER — PAROXETINE 30 MG/1
30 TABLET, FILM COATED ORAL EVERY MORNING
Qty: 90 TABLET | Refills: 1 | Status: SHIPPED | OUTPATIENT
Start: 2019-10-09 | End: 2020-01-31

## 2019-10-09 NOTE — PROGRESS NOTES
Re-Assessment / Re-Certification      Patient: Jose Hurtado   : 1950  Diagnosis/ICD-10 Code:  Chronic bilateral low back pain without sciatica [M54.5, G89.29]  Referring practitioner: Fernando Flores MD  Date of Initial Visit: 2019  Today's Date: 10/9/2019  Patient seen for 4 sessions      Subjective:   Jose Hurtado reports: My back is feeling much better. My knees have been hurting more past 4-5 days, especially the L  Subjective Questionnaire: Back Index NT   Clinical Progress: improved  Home Program Compliance: Yes  Treatment has included: therapeutic exercise and manual therapy    Objective   BAck extension 25%  Hip Extension L 4/5; R 4+/5  Knee AROM: L 0-116; R 0-118  Quad 5/5 B    Assessment/Plan   Knees feel much better after treatment. Melody and upright posture improved  Short Term Goals: ( 4 weeks) MET  1.Pt to be independent with HEP  2. Pt to exhibit improved lumbar extension to 25% to allow for upright standing and walking posture  3. Pt to demonstrate proper lifting technique  4. Pt to improve L hip extension  strength to 4/5 to allow for improved standing/stairclimbing tolerance    Long Term Goals (8 weeks ) PROGRESSING  1. Pt to demonstrate ability to lift safely without LBP  2. Pt to exhibit 4/5 lower abdominal strength to allow for more strenuous daily activities  3. Pt to exhibit lumbar rotational AROM to 100% to allow for reaching and bending with min to no pain  4. Pt to score <25% on Back Index  Progress toward previous goals: Partially Met        Recommendations: Continue as planned  Timeframe: 1 month  Prognosis to achieve goals: good    PT Signature: Caroline Vasquez, PT  [place] License # [id number]    Based upon review of the patient's progress and continued therapy plan, it is my medical opinion that Jose Hurtado should continue physical therapy treatment at [facility]  [address] [place] [address]  [id number]  [phone].    Signature: __________________________________  Fernando Flores MD    Manual Therapy:    15     mins  44001;  Therapeutic Exercise:    8     mins  99304;     Neuromuscular Wil:        mins  10414;    Therapeutic Activity:          mins  18160;     Gait Training:           mins  11518;     Ultrasound:          mins  96710;    Electrical Stimulation:         mins  83950 ( );  Dry Needling          mins self-pay    Timed Treatment:   23   mins   Total Treatment:     38   mins

## 2019-10-15 ENCOUNTER — LAB (OUTPATIENT)
Dept: OTHER | Facility: HOSPITAL | Age: 69
End: 2019-10-15

## 2019-10-15 ENCOUNTER — CLINICAL SUPPORT (OUTPATIENT)
Dept: ORTHOPEDIC SURGERY | Facility: CLINIC | Age: 69
End: 2019-10-15

## 2019-10-15 VITALS — HEIGHT: 77 IN | WEIGHT: 210 LBS | BODY MASS INDEX: 24.79 KG/M2

## 2019-10-15 DIAGNOSIS — D50.9 IRON DEFICIENCY ANEMIA, UNSPECIFIED IRON DEFICIENCY ANEMIA TYPE: ICD-10-CM

## 2019-10-15 DIAGNOSIS — M17.12 PRIMARY OSTEOARTHRITIS OF LEFT KNEE: Primary | ICD-10-CM

## 2019-10-15 LAB
BASOPHILS # BLD AUTO: 0.02 10*3/MM3 (ref 0–0.2)
BASOPHILS NFR BLD AUTO: 0.6 % (ref 0–1.5)
DEPRECATED RDW RBC AUTO: 50.2 FL (ref 37–54)
EOSINOPHIL # BLD AUTO: 0.14 10*3/MM3 (ref 0–0.4)
EOSINOPHIL NFR BLD AUTO: 4.5 % (ref 0.3–6.2)
ERYTHROCYTE [DISTWIDTH] IN BLOOD BY AUTOMATED COUNT: 14.3 % (ref 12.3–15.4)
FERRITIN SERPL-MCNC: 72.1 NG/ML (ref 30–400)
HCT VFR BLD AUTO: 31.8 % (ref 37.5–51)
HGB BLD-MCNC: 9.9 G/DL (ref 13–17.7)
HGB RETIC QN AUTO: 31.7 PG (ref 29.8–36.1)
IMM GRANULOCYTES # BLD AUTO: 0 10*3/MM3 (ref 0–0.05)
IMM GRANULOCYTES NFR BLD AUTO: 0 % (ref 0–0.5)
IMM RETICS NFR: 14.7 % (ref 3–15.8)
IRON 24H UR-MRATE: 34 MCG/DL (ref 59–158)
IRON SATN MFR SERPL: 10 % (ref 20–50)
LYMPHOCYTES # BLD AUTO: 0.77 10*3/MM3 (ref 0.7–3.1)
LYMPHOCYTES NFR BLD AUTO: 24.5 % (ref 19.6–45.3)
MCH RBC QN AUTO: 29.8 PG (ref 26.6–33)
MCHC RBC AUTO-ENTMCNC: 31.1 G/DL (ref 31.5–35.7)
MCV RBC AUTO: 95.8 FL (ref 79–97)
MONOCYTES # BLD AUTO: 0.31 10*3/MM3 (ref 0.1–0.9)
MONOCYTES NFR BLD AUTO: 9.9 % (ref 5–12)
NEUTROPHILS # BLD AUTO: 1.9 10*3/MM3 (ref 1.7–7)
NEUTROPHILS NFR BLD AUTO: 60.5 % (ref 42.7–76)
NRBC BLD AUTO-RTO: 0 /100 WBC (ref 0–0.2)
PLATELET # BLD AUTO: 119 10*3/MM3 (ref 140–450)
PMV BLD AUTO: 8.9 FL (ref 6–12)
RBC # BLD AUTO: 3.32 10*6/MM3 (ref 4.14–5.8)
RETICS/RBC NFR AUTO: 0.9 % (ref 0.7–1.9)
TIBC SERPL-MCNC: 325 MCG/DL (ref 298–536)
TRANSFERRIN SERPL-MCNC: 218 MG/DL (ref 200–360)
WBC NRBC COR # BLD: 3.14 10*3/MM3 (ref 3.4–10.8)

## 2019-10-15 PROCEDURE — 20610 DRAIN/INJ JOINT/BURSA W/O US: CPT | Performed by: NURSE PRACTITIONER

## 2019-10-15 PROCEDURE — 83540 ASSAY OF IRON: CPT | Performed by: INTERNAL MEDICINE

## 2019-10-15 PROCEDURE — 85046 RETICYTE/HGB CONCENTRATE: CPT | Performed by: INTERNAL MEDICINE

## 2019-10-15 PROCEDURE — 84466 ASSAY OF TRANSFERRIN: CPT | Performed by: INTERNAL MEDICINE

## 2019-10-15 PROCEDURE — 82728 ASSAY OF FERRITIN: CPT | Performed by: INTERNAL MEDICINE

## 2019-10-15 PROCEDURE — 36415 COLL VENOUS BLD VENIPUNCTURE: CPT

## 2019-10-15 PROCEDURE — 85025 COMPLETE CBC W/AUTO DIFF WBC: CPT | Performed by: INTERNAL MEDICINE

## 2019-10-15 RX ORDER — METHYLPREDNISOLONE ACETATE 80 MG/ML
80 INJECTION, SUSPENSION INTRA-ARTICULAR; INTRALESIONAL; INTRAMUSCULAR; SOFT TISSUE
Status: COMPLETED | OUTPATIENT
Start: 2019-10-15 | End: 2019-10-15

## 2019-10-15 RX ORDER — CEPHALEXIN 500 MG/1
500 CAPSULE ORAL
COMMUNITY
Start: 2019-10-14 | End: 2019-10-25

## 2019-10-15 RX ORDER — ZOSTER VACCINE RECOMBINANT, ADJUVANTED 50 MCG/0.5
KIT INTRAMUSCULAR
COMMUNITY
Start: 2019-10-12 | End: 2019-11-27

## 2019-10-15 RX ADMIN — METHYLPREDNISOLONE ACETATE 80 MG: 80 INJECTION, SUSPENSION INTRA-ARTICULAR; INTRALESIONAL; INTRAMUSCULAR; SOFT TISSUE at 08:04

## 2019-10-15 NOTE — PROGRESS NOTES
10/15/2019    Jose Hurtado is here today for worsening knee pain. Pt has undergone injection of the knee in the past with good resolution of symptoms. Pt is requesting a repeat injection.     KNEE Injection Procedure Note:    Large Joint Arthrocentesis: L knee  Date/Time: 10/15/2019 8:04 AM  Consent given by: patient  Site marked: site marked  Timeout: Immediately prior to procedure a time out was called to verify the correct patient, procedure, equipment, support staff and site/side marked as required   Supporting Documentation  Indications: pain and joint swelling   Procedure Details  Location: knee - L knee  Preparation: Patient was prepped and draped in the usual sterile fashion  Needle gauge: 21.  Approach: anterolateral  Medications administered: 4 mL lidocaine (cardiac); 80 mg methylPREDNISolone acetate 80 MG/ML  Patient tolerance: patient tolerated the procedure well with no immediate complications          At the conclusion of the injection I discussed the importance of continued quad strengthening exercises on a daily basis. I will see the patient back if the symptoms should fail to improve or worsen.    Luba Osuna APRN  10/15/2019

## 2019-10-18 ENCOUNTER — APPOINTMENT (OUTPATIENT)
Dept: OTHER | Facility: HOSPITAL | Age: 69
End: 2019-10-18

## 2019-10-23 ENCOUNTER — TREATMENT (OUTPATIENT)
Dept: PHYSICAL THERAPY | Facility: CLINIC | Age: 69
End: 2019-10-23

## 2019-10-23 DIAGNOSIS — G89.29 CHRONIC BILATERAL LOW BACK PAIN WITHOUT SCIATICA: Primary | ICD-10-CM

## 2019-10-23 DIAGNOSIS — R26.9 GAIT DISTURBANCE: ICD-10-CM

## 2019-10-23 DIAGNOSIS — M54.50 CHRONIC BILATERAL LOW BACK PAIN WITHOUT SCIATICA: Primary | ICD-10-CM

## 2019-10-23 DIAGNOSIS — R29.898 WEAKNESS OF BOTH LEGS: ICD-10-CM

## 2019-10-23 PROCEDURE — 97140 MANUAL THERAPY 1/> REGIONS: CPT | Performed by: PHYSICAL THERAPIST

## 2019-10-23 PROCEDURE — 97110 THERAPEUTIC EXERCISES: CPT | Performed by: PHYSICAL THERAPIST

## 2019-10-23 PROCEDURE — 97112 NEUROMUSCULAR REEDUCATION: CPT | Performed by: PHYSICAL THERAPIST

## 2019-10-23 NOTE — PROGRESS NOTES
"Physical Therapy Discharge Note  IE 8/29/19  VIsit # 5      Subjective     Jose Hurtado reports: My back is doing well. I am going to see Dr. Solis about getting surgery on L knee.    Objective   See Exercise, Manual, and Modality Logs for complete treatment.       Assessment/Plan   Short Term Goals: ( 4 weeks) MET  1.Pt to be independent with HEP  2. Pt to exhibit improved lumbar extension to 25% to allow for upright standing and walking posture  3. Pt to demonstrate proper lifting technique  4. Pt to improve L hip extension  strength to 4/5 to allow for improved standing/stairclimbing tolerance    Long Term Goals (8 weeks )  1. Pt to demonstrate ability to lift safely without LBP MET  2. Pt to exhibit 4/5 lower abdominal strength to allow for more strenuous daily activities MET  3. Pt to exhibit lumbar rotational AROM to 100% to allow for reaching and bending with min to no pain MET  4. Pt to score <25% on Back Index NT    Reviewed and perform \"prehab\" exercises for L knee. Reviewed back therex and doing well.    DC PT           Manual Therapy:  12       mins  07819;  Therapeutic Exercise: 22     mins  41105;     Neuromuscular Wil:5       mins  46641;    Therapeutic Activity:         mins  71477;     Gait Training:         mins  53229;     Ultrasound:         mins  30369;    Electrical Stimulation:        mins  64813 ( );  Dry Needling         mins self-pay    Timed Treatment:  39    mins   Total Treatment:     39  mins    Caroline Vasquez, PT  Physical Therapist  [place] License # [id number]  "

## 2019-10-24 NOTE — PROGRESS NOTES
Subjective .     REASONS FOR FOLLOWUP:  Esophageal cancer, cytopenias     HISTORY OF PRESENT ILLNESS:  The patient is a 69 y.o. year old male  who is here for follow-up with the above-mentioned history.    No new problems since last visit.  Denies pain, problems eating, weight loss, nausea    Having no problems tolerating oral iron twice per day.    Past Medical History:   Diagnosis Date   • Anemia    • Anti-phospholipid syndrome (CMS/HCC)     On lifelong anticoagulation therapy   • Bladder disorder     LEAKAGE   ON MED  wers pads   • Community acquired pneumonia     HISTORY OF IN 2014   • Deep venous thrombosis (CMS/HCC) 2006, 2008    Left lower extremity multiple   • Depression    • Esophageal carcinoma (CMS/HCC) 12/31/2014    had chemo and radiation prior surgery   • Hemorrhoids    • HH (hiatus hernia)    • History of atrial fibrillation 2015    ONE EPISODE WHILE HOSPITALIZED   • History of kidney stones    • History of nephrolithiasis    • History of pancreatitis     PT STATES MANY YEARS AGO   • History of radiation therapy    • Hypertension    • Long-term (current) use of anticoagulants, INR goal 2.0-3.0    • Malignant neoplasm of prostate (CMS/HCC)    • Other hyperlipidemia 1/30/2018 January 30, 2018 lipid panel risk 12.8%   • Restless legs syndrome    • Squamous carcinoma     on the head     Past Surgical History:   Procedure Laterality Date   • APPENDECTOMY  1950   • BRONCHOSCOPY      (Diagnostic)   • CATARACT EXTRACTION Bilateral 2014   • COLONOSCOPY  12/15/2014    Complete / Description: EH, IH, torts, stool, follow-up colonoscopy due in 5 years.   • COLONOSCOPY N/A 6/13/2017    non-thrombosed external hemorrhoids, normal examined ileum, IH   • COLONOSCOPY N/A 2/26/2019    Procedure: COLONOSCOPY with Cold Polypectomy;  Surgeon: Mulugeta Millan MD;  Location: [facility] ENDOSCOPY;  Service: Gastroenterology   • CYSTOSCOPY W/ LASER LITHOTRIPSY     • ENDOSCOPY N/A 6/13/2017    Procedure:  ESOPHAGOGASTRODUODENOSCOPY WITH COLD BIOPSY;  Surgeon: Lake Gonzalez MD;  Location: [facility] ENDOSCOPY;  Service:    • ESOPHAGECTOMY      April 2015, stage IIB esophageal carcinoma, sub-total resection.   • ESOPHAGECTOMY      Esophagectomy Subtotal Chandler Joe Procedure   • EXCISION LESION  08/2012    Removal of Squamous Cell CA on Head   • HAMMER TOE REPAIR  09/2014    Hammertoe Operation (Each Toe), 10/2014   • HAMMER TOE REPAIR Left 10/3/2017    Procedure: Left second third and fourth distal interphalangeal joint resection with flexor tenotomy;  Surgeon: Mulugeta Lira MD;  Location:  [facility];  Service:    • HERNIA REPAIR      incisional   • JEJUNOSTOMY      Laparoscopic   • JEJUNOSTOMY      tube removal    • KNEE SURGERY Bilateral 1967, 1973, 1981   • PATELLA SURGERY Left     removed   • PILONIDAL CYST / SINUS EXCISION     • ID TOTAL KNEE ARTHROPLASTY Right 3/26/2018    Procedure: TOTAL KNEE ARTHROPLASTY;  Surgeon: Renny Solis MD;  Location: [facility] OR;  Service: Orthopedics   • PROSTATECTOMY  2010   • PYLOROPLASTY     • SPINAL FUSION  02/1998    C 5,6   • TONSILLECTOMY  1955   • TONSILLECTOMY     • UPPER GASTROINTESTINAL ENDOSCOPY  12/15/2014    LA Grade D esophagitis, Pardo's, HH, multiple duodenal ulcers   • VENTRAL/INCISIONAL HERNIA REPAIR N/A 4/14/2016    Procedure: VENTRAL/INCISIONAL HERNIA REPAIR, open, with mesh, and component separation;  Surgeon: Darren Rivas MD;  Location: [facility] MAIN OR;  Service:        HEMATOLOGIC/ONCOLOGIC HISTORY:  (History from previous dates can be found in the separate document.)    MEDICATIONS    Current Outpatient Medications:   •  ferrous sulfate 325 (65 FE) MG tablet, TAKE ONE TABLET BY MOUTH DAILY WITH BREAKFAST, Disp: 90 tablet, Rfl: 0  •  PARoxetine (PAXIL) 30 MG tablet, Take 1 tablet by mouth Every Morning., Disp: 90 tablet, Rfl: 1  •  pramipexole (MIRAPEX) 1.5 MG tablet, Take 1 tablet by mouth 2 (Two) Times a Day., Disp: 180 tablet,  Rfl: 1  •  SHINGRIX 50 MCG/0.5ML reconstituted suspension, , Disp: , Rfl:   •  tamsulosin (FLOMAX) 0.4 MG capsule 24 hr capsule, Take 1 capsule by mouth Every Night., Disp: , Rfl:   •  traZODone (DESYREL) 50 MG tablet, TAKE ONE TABLET BY MOUTH ONCE NIGHTLY, Disp: 90 tablet, Rfl: 3  •  warfarin (COUMADIN) 5 MG tablet, Take 1 tablet by mouth Daily., Disp: 90 tablet, Rfl: 2    Current Facility-Administered Medications:   •  cyanocobalamin injection 1,000 mcg, 1,000 mcg, Intramuscular, Q28 Days, Brandin Bolton MD, 1,000 mcg at 19 09    ALLERGIES:   No Known Allergies    SOCIAL HISTORY:       Social History     Socioeconomic History   • Marital status:      Spouse name: Lena   • Number of children: 0   • Years of education: College   • Highest education level: Not on file   Occupational History   • Occupation: Retired      Comment: Retired    Tobacco Use   • Smoking status: Former Smoker     Packs/day: 2.00     Years: 3.00     Pack years: 6.00     Types: Cigarettes     Last attempt to quit:      Years since quittin.8   • Smokeless tobacco: Never Used   Substance and Sexual Activity   • Alcohol use: Yes     Frequency: 2-3 times a week     Comment: 2-3 x per week   • Drug use: No   • Sexual activity: Defer   Social History Narrative    self-employed          FAMILY HISTORY:  Family History   Problem Relation Age of Onset   • Cerebral aneurysm Mother         cerebral artery aneurysm ( age 56)   • Prostate cancer Brother 68   • Anxiety disorder Father    • Suicide Attempts Father          of suicide   • Cancer Father         bladder   • No Known Problems Brother    • Colon cancer Neg Hx    • Esophageal cancer Neg Hx    • Dementia Neg Hx        REVIEW OF SYSTEMS:    Review of Systems   Constitutional: Negative for activity change.   HENT: Negative for nosebleeds and trouble swallowing.    Respiratory: Negative for shortness of breath and wheezing.   "  Cardiovascular: Negative for chest pain and palpitations.   Gastrointestinal: Negative for constipation and diarrhea.   Genitourinary: Negative for dysuria and hematuria.   Musculoskeletal: Positive for back pain. Negative for arthralgias and myalgias.   Neurological: Negative for seizures and syncope.   Hematological: Negative for adenopathy. Does not bruise/bleed easily.   Psychiatric/Behavioral: Negative for confusion.           Objective    Vitals:    10/25/19 1048   BP: 129/69   Pulse: 72   Resp: 16   Temp: 97.6 °F (36.4 °C)   SpO2: 100%   Weight: 99.4 kg (219 lb 3.2 oz)   Height: 195.6 cm (77.01\")   PainSc: 0-No pain     Current Status 10/25/2019   ECOG score 0      PHYSICAL EXAM:    CONSTITUTIONAL:  Vital signs reviewed.  No distress, looks comfortable.  EYES:  Conjunctiva and lids unremarkable.  PERRLA  EARS,NOSE,MOUTH,THROAT:  Ears and nose appear unremarkable.  Lips, teeth, gums appear unremarkable.  RESPIRATORY:  Normal respiratory effort.  Lungs clear to auscultation bilaterally.  CARDIOVASCULAR:  Normal S1, S2.  No murmurs rubs or gallops.  Left leg significantly larger than right, chronic since DVT  GASTROINTESTINAL: Abdomen appears unremarkable.  Nontender.  No hepatomegaly.  No splenomegaly.  LYMPHATIC:  No cervical, supraclavicular, axillary lymphadenopathy.  SKIN:  Warm.  No rashes.  PSYCHIATRIC:  Normal judgment and insight.  Normal mood and affect.      RECENT LABS:        WBC   Date/Time Value Ref Range Status   10/15/2019 10:47 AM 3.14 (L) 3.40 - 10.80 10*3/mm3 Final   04/15/2019 12:31 PM 3.53 3.40 - 10.80 10*3/mm3 Final   04/16/2018 04:25 PM 4.23 (L) 4.5 - 11.0 10*3/uL Final     Hemoglobin   Date/Time Value Ref Range Status   10/15/2019 10:47 AM 9.9 (L) 13.0 - 17.7 g/dL Final   04/16/2018 04:25 PM 9.9 (L) 13.5 - 17.5 g/dL Final     Platelets   Date/Time Value Ref Range Status   10/15/2019 10:47  (L) 140 - 450 10*3/mm3 Final   04/16/2018 04:25  140 - 440 10*3/uL Final "       Assessment/Plan     ASSESSMENT:  Adenocarcinoma of esophagus (CMS/HCC)  - CBC & Differential  - Retic With IRF & RET-He  - Iron Profile  - Ferritin      *Esophageal adenocarcinoma. Initially T2N0M0. After neoadjuvant carboplatin/Taxol with radiation, achieved a pathologic CR at resection, 4/24/2015.   · As per NCCN guidelines, plan CAT scans every 6 months x1 year, then every 6 to 9 months on years 2 and years 3. Defer any surveillance EGDs to Dr. Gonzalez if he feels they are appropriate.   · Also as per NCCN guidelines, plan H and P every 3 to 6 months on years 1 and years 2, then every 6 to 12 months' time on years 3 through years 5 and then annually.   · EGD 6/13/17 by Dr. Gonzalez: No evidence of recurrence.  · CT scan 3/2/18 (this completed 3 years of surveillance CT): No evidence of recurrence.  Plan no more surveillance CT.  No clinical signs of recurrence.  Remains in remission.    *Recurrent DVT, prior to cancer diagnosis.    · Dr. Bolton manages his Coumadin.   · Chronic left leg larger than right, since DVT  No clinical signs of recurrent clotting today    *Leukocytopenia, anemia, thrombocytopenia.   ·  He had a white blood cell count in the upper 3s and a hemoglobin in the upper 12s with a normal platelet count prior to beginning chemotherapy. We will monitor this.   WBC 3.1, Hb 9.9, .    *Persistent cough.  He has seen Dr. Maher Sayied for this.    He did not complain of this today.    *Previously complained of emesis occurring 5 hours after eating on average once every month or 2.  No nausea otherwise.  Denies dysphagia or odynophagia.    Unchanged.  Occurring on average once every couple of months.  He did not complain of this today    *Iron deficiency anemia  · Hb mostly around 11  · On 4/15/2019, ferritin 29.7, 13% saturation.  Serum folate normal.  · On oral iron daily through PCP.  · On 8/23/19, ferritin 65, 14%.   · Increased oral iron.   · On 10/15/19, ferritin 72, 10%.  · On 10/25/2019,  stopped oral iron since it was not helping.  Arranged 2 doses Injectafer.  I explained IV iron as a possibility of life-threatening allergic reactions.  Patient understands this and wants to proceed.     *Source of iron deficiency.  · Last colonoscopy 2/26/2019 by Dr. Millan.  Last EGD 6/13/2017 by Dr. Gonzalez.  · Suspect he has an absorption issue due to previous esophageal surgery.    *B12 deficiency.  · On 6/6/2019, B12 <150  · On B12 injections through PCP.  Today he states he remains on B12 injections.    PLAN:  2 doses Injectafer  MD 2 months.  Iron labs 1 week prior    Plan was:  · M.D. CBC 6 months  · No more surveillance CT scans.  · He does not have a port.  · Iron labs today to see if he would benefit from an increase in iron.  · He agrees if we need to increase his iron, we will likely make his follow-up sooner than 6 months    Wife assisted with history

## 2019-10-25 ENCOUNTER — OFFICE VISIT (OUTPATIENT)
Dept: ONCOLOGY | Facility: CLINIC | Age: 69
End: 2019-10-25

## 2019-10-25 ENCOUNTER — APPOINTMENT (OUTPATIENT)
Dept: OTHER | Facility: HOSPITAL | Age: 69
End: 2019-10-25

## 2019-10-25 VITALS
BODY MASS INDEX: 25.88 KG/M2 | RESPIRATION RATE: 16 BRPM | OXYGEN SATURATION: 100 % | SYSTOLIC BLOOD PRESSURE: 129 MMHG | HEIGHT: 77 IN | WEIGHT: 219.2 LBS | HEART RATE: 72 BPM | DIASTOLIC BLOOD PRESSURE: 69 MMHG | TEMPERATURE: 97.6 F

## 2019-10-25 DIAGNOSIS — C15.9 ADENOCARCINOMA OF ESOPHAGUS (HCC): Primary | ICD-10-CM

## 2019-10-25 PROBLEM — K91.2 POSTSURGICAL MALABSORPTION: Status: ACTIVE | Noted: 2019-10-25

## 2019-10-25 PROCEDURE — 99214 OFFICE O/P EST MOD 30 MIN: CPT | Performed by: INTERNAL MEDICINE

## 2019-10-25 RX ORDER — SODIUM CHLORIDE 9 MG/ML
250 INJECTION, SOLUTION INTRAVENOUS ONCE
Status: CANCELLED | OUTPATIENT
Start: 2019-10-29

## 2019-10-25 RX ORDER — PROCHLORPERAZINE MALEATE 10 MG
10 TABLET ORAL ONCE
Status: CANCELLED
Start: 2019-11-08 | End: 2019-11-04

## 2019-10-25 RX ORDER — PROCHLORPERAZINE MALEATE 10 MG
10 TABLET ORAL ONCE
Status: CANCELLED
Start: 2019-10-29 | End: 2019-10-28

## 2019-10-25 RX ORDER — SODIUM CHLORIDE 9 MG/ML
250 INJECTION, SOLUTION INTRAVENOUS ONCE
Status: CANCELLED | OUTPATIENT
Start: 2019-11-08

## 2019-10-25 NOTE — PROGRESS NOTES
Supportive plan entered for Injectafer 750 mg IV x 2 doses with Compazine 10 mg PO to be given prior to each dose V.OEmperatriz Phelan

## 2019-10-29 ENCOUNTER — INFUSION (OUTPATIENT)
Dept: ONCOLOGY | Facility: HOSPITAL | Age: 69
End: 2019-10-29

## 2019-10-29 VITALS
OXYGEN SATURATION: 97 % | TEMPERATURE: 97.9 F | BODY MASS INDEX: 25.92 KG/M2 | HEART RATE: 85 BPM | DIASTOLIC BLOOD PRESSURE: 73 MMHG | WEIGHT: 218.6 LBS | SYSTOLIC BLOOD PRESSURE: 148 MMHG

## 2019-10-29 DIAGNOSIS — K91.2 POSTSURGICAL MALABSORPTION: Primary | ICD-10-CM

## 2019-10-29 DIAGNOSIS — D50.9 IRON DEFICIENCY ANEMIA, UNSPECIFIED IRON DEFICIENCY ANEMIA TYPE: ICD-10-CM

## 2019-10-29 DIAGNOSIS — C15.9 ADENOCARCINOMA OF ESOPHAGUS (HCC): ICD-10-CM

## 2019-10-29 PROCEDURE — 96374 THER/PROPH/DIAG INJ IV PUSH: CPT | Performed by: INTERNAL MEDICINE

## 2019-10-29 PROCEDURE — 63710000001 PROCHLORPERAZINE MALEATE PER 5 MG: Performed by: INTERNAL MEDICINE

## 2019-10-29 PROCEDURE — 25010000002 FERRIC CARBOXYMALTOSE 750 MG/15ML SOLUTION 15 ML VIAL: Performed by: INTERNAL MEDICINE

## 2019-10-29 RX ORDER — SODIUM CHLORIDE 9 MG/ML
250 INJECTION, SOLUTION INTRAVENOUS ONCE
Status: COMPLETED | OUTPATIENT
Start: 2019-10-29 | End: 2019-10-29

## 2019-10-29 RX ORDER — PROCHLORPERAZINE MALEATE 5 MG/1
10 TABLET ORAL ONCE
Status: COMPLETED | OUTPATIENT
Start: 2019-10-29 | End: 2019-10-29

## 2019-10-29 RX ADMIN — SODIUM CHLORIDE 250 ML: 900 INJECTION, SOLUTION INTRAVENOUS at 15:28

## 2019-10-29 RX ADMIN — PROCHLORPERAZINE MALEATE 10 MG: 5 TABLET, FILM COATED ORAL at 15:34

## 2019-10-29 RX ADMIN — FERRIC CARBOXYMALTOSE INJECTION 750 MG: 50 INJECTION, SOLUTION INTRAVENOUS at 15:37

## 2019-10-30 ENCOUNTER — CLINICAL SUPPORT (OUTPATIENT)
Dept: INTERNAL MEDICINE | Age: 69
End: 2019-10-30

## 2019-10-30 ENCOUNTER — ANTICOAGULATION VISIT (OUTPATIENT)
Dept: INTERNAL MEDICINE | Age: 69
End: 2019-10-30

## 2019-10-30 DIAGNOSIS — Z23 NEED FOR HEPATITIS A IMMUNIZATION: Primary | ICD-10-CM

## 2019-10-30 DIAGNOSIS — D68.61 ANTI-PHOSPHOLIPID SYNDROME (HCC): ICD-10-CM

## 2019-10-30 LAB — INR PPP: 1.7 (ref 2–3)

## 2019-10-30 PROCEDURE — 85610 PROTHROMBIN TIME: CPT | Performed by: INTERNAL MEDICINE

## 2019-10-30 PROCEDURE — 90471 IMMUNIZATION ADMIN: CPT | Performed by: INTERNAL MEDICINE

## 2019-10-30 PROCEDURE — 90632 HEPA VACCINE ADULT IM: CPT | Performed by: INTERNAL MEDICINE

## 2019-10-30 PROCEDURE — 93793 ANTICOAG MGMT PT WARFARIN: CPT | Performed by: INTERNAL MEDICINE

## 2019-10-30 PROCEDURE — 36416 COLLJ CAPILLARY BLOOD SPEC: CPT | Performed by: INTERNAL MEDICINE

## 2019-10-30 PROCEDURE — 96372 THER/PROPH/DIAG INJ SC/IM: CPT | Performed by: INTERNAL MEDICINE

## 2019-10-30 RX ADMIN — CYANOCOBALAMIN 1000 MCG: 1000 INJECTION, SOLUTION INTRAMUSCULAR; SUBCUTANEOUS at 09:59

## 2019-10-30 NOTE — PROGRESS NOTES
Call patient with INR result:    Comment: 1.7 / 20.5 DOSAGE 5mg MON,TU,TH,FRI / 2.5mg SUN,WED,SAT     INR is below therapeutic level. Take EXTRA 2.5 mg today only. Then take 5 mg everyday, except 2.5 mg on Tuesdays and Saturdays. Recheck INR in 2 weeks.      Lab Results       Component                Value               Date                       INR                      1.70 (A)            10/30/2019                 INR                      4.00 (A)            09/30/2019                 INR                      2.30 (A)            08/30/2019

## 2019-11-08 ENCOUNTER — INFUSION (OUTPATIENT)
Dept: ONCOLOGY | Facility: HOSPITAL | Age: 69
End: 2019-11-08

## 2019-11-08 VITALS
DIASTOLIC BLOOD PRESSURE: 68 MMHG | WEIGHT: 220 LBS | SYSTOLIC BLOOD PRESSURE: 145 MMHG | BODY MASS INDEX: 26.08 KG/M2 | OXYGEN SATURATION: 97 % | HEART RATE: 80 BPM | RESPIRATION RATE: 16 BRPM | TEMPERATURE: 97.9 F

## 2019-11-08 DIAGNOSIS — D50.9 IRON DEFICIENCY ANEMIA, UNSPECIFIED IRON DEFICIENCY ANEMIA TYPE: ICD-10-CM

## 2019-11-08 DIAGNOSIS — K91.2 POSTSURGICAL MALABSORPTION: Primary | ICD-10-CM

## 2019-11-08 DIAGNOSIS — C15.9 ADENOCARCINOMA OF ESOPHAGUS (HCC): ICD-10-CM

## 2019-11-08 PROCEDURE — 96374 THER/PROPH/DIAG INJ IV PUSH: CPT | Performed by: INTERNAL MEDICINE

## 2019-11-08 PROCEDURE — 25010000002 FERRIC CARBOXYMALTOSE 750 MG/15ML SOLUTION 15 ML VIAL: Performed by: INTERNAL MEDICINE

## 2019-11-08 PROCEDURE — 63710000001 PROCHLORPERAZINE MALEATE PER 5 MG: Performed by: INTERNAL MEDICINE

## 2019-11-08 RX ORDER — SODIUM CHLORIDE 9 MG/ML
250 INJECTION, SOLUTION INTRAVENOUS ONCE
Status: COMPLETED | OUTPATIENT
Start: 2019-11-08 | End: 2019-11-08

## 2019-11-08 RX ORDER — PROCHLORPERAZINE MALEATE 5 MG/1
10 TABLET ORAL ONCE
Status: COMPLETED | OUTPATIENT
Start: 2019-11-08 | End: 2019-11-08

## 2019-11-08 RX ADMIN — SODIUM CHLORIDE 250 ML: 900 INJECTION, SOLUTION INTRAVENOUS at 14:42

## 2019-11-08 RX ADMIN — PROCHLORPERAZINE MALEATE 10 MG: 5 TABLET ORAL at 14:42

## 2019-11-08 RX ADMIN — FERRIC CARBOXYMALTOSE INJECTION 750 MG: 50 INJECTION, SOLUTION INTRAVENOUS at 14:52

## 2019-11-11 RX ORDER — FERROUS SULFATE 325(65) MG
TABLET ORAL
Qty: 90 TABLET | Refills: 0 | Status: ON HOLD | OUTPATIENT
Start: 2019-11-11 | End: 2020-02-19 | Stop reason: SDUPTHER

## 2019-11-11 NOTE — TELEPHONE ENCOUNTER
LOV  09.24.19  NOV  03.24.20     Phoned patient no answer voicemail box full unable to leave a message

## 2019-11-21 ENCOUNTER — OFFICE VISIT (OUTPATIENT)
Dept: ORTHOPEDIC SURGERY | Facility: CLINIC | Age: 69
End: 2019-11-21

## 2019-11-21 VITALS — BODY MASS INDEX: 24.79 KG/M2 | HEIGHT: 77 IN | TEMPERATURE: 98.9 F | WEIGHT: 210 LBS

## 2019-11-21 DIAGNOSIS — M25.562 CHRONIC PAIN OF LEFT KNEE: Primary | ICD-10-CM

## 2019-11-21 DIAGNOSIS — G89.29 CHRONIC PAIN OF LEFT KNEE: Primary | ICD-10-CM

## 2019-11-21 PROCEDURE — 73562 X-RAY EXAM OF KNEE 3: CPT | Performed by: ORTHOPAEDIC SURGERY

## 2019-11-21 PROCEDURE — 99213 OFFICE O/P EST LOW 20 MIN: CPT | Performed by: ORTHOPAEDIC SURGERY

## 2019-11-21 NOTE — PROGRESS NOTES
"Patient: Jose Hurtado  YOB: 1950 69 y.o. male  Medical Record Number: [id number]    Chief Complaints:   Chief Complaint   Patient presents with   • Left Knee - Follow-up, Pain       History of Present Illness:Jose Hurtado is a 69 y.o. male who presents for follow-up of  Left knee pain -  Moderate anterior ache    Allergies: No Known Allergies    Medications:   Current Outpatient Medications   Medication Sig Dispense Refill   • PARoxetine (PAXIL) 30 MG tablet Take 1 tablet by mouth Every Morning. 90 tablet 1   • pramipexole (MIRAPEX) 1.5 MG tablet Take 1 tablet by mouth 2 (Two) Times a Day. 180 tablet 1   • SHINGRIX 50 MCG/0.5ML reconstituted suspension      • tamsulosin (FLOMAX) 0.4 MG capsule 24 hr capsule Take 1 capsule by mouth Every Night.     • traZODone (DESYREL) 50 MG tablet TAKE ONE TABLET BY MOUTH ONCE NIGHTLY 90 tablet 3   • warfarin (COUMADIN) 5 MG tablet Take 1 tablet by mouth Daily. 90 tablet 2   • ferrous sulfate 325 (65 FE) MG tablet TAKE ONE TABLET BY MOUTH DAILY WITH BREAKFAST 90 tablet 0     Current Facility-Administered Medications   Medication Dose Route Frequency Provider Last Rate Last Dose   • cyanocobalamin injection 1,000 mcg  1,000 mcg Intramuscular Q28 Days Brandin Bolton MD   1,000 mcg at 10/30/19 0959         The following portions of the patient's history were reviewed and updated as appropriate: allergies, current medications, past family history, past medical history, past social history, past surgical history and problem list.    Review of Systems:   A 14 point review of systems was performed. All systems negative except pertinent positives/negative listed in HPI above    Physical Exam:   Vitals:    11/21/19 1501   Temp: 98.9 °F (37.2 °C)   TempSrc: Temporal   Weight: 95.3 kg (210 lb)   Height: 195.6 cm (77\")   PainSc:   3   PainLoc: Knee       General: A and O x 3, ASA, NAD    SCLERA:    Normal    DENTITION:   Normal  Knee:  left    ALIGNMENT:     Varus  ,   Patella  " tracks  midline    GAIT:    Antalgic - flexed knee gait - ataxic    SKIN:    No abnormality    RANGE OF MOTION:   10  -  110   DEG    STRENGTH:   4  / 5    LIGAMENTS:    No varus / valgus instability.   Negative  Lachman.    MENISCUS:     Negative   Jackie       DISTAL PULSES:    Paplable    DISTAL SENSATION :   Intact    LYMPHATICS:     No   lymphadenopathy    OTHER:          - No   effusion      - Crepitance with ROM       Radiology:  Xrays 3views left  knee (ap,lateral, sunrise) were ordered and reviewed for evaluation of knee pain demonstratingmoderate joint space narrowing, chondrocalcinosis - s/p patellectomy  todays xrays were compared to previous xrays and demonstrate no change    Assessment/Plan:  Bilateral knee pain I think most of this is muscular in nature and I do not feel that is something I can fix with joint replacement surgery.  I recommended water therapy is he will do that on his own but if he like he can call back and we will set up formal PT

## 2019-11-22 ENCOUNTER — OFFICE VISIT (OUTPATIENT)
Dept: SLEEP MEDICINE | Facility: HOSPITAL | Age: 69
End: 2019-11-22

## 2019-11-22 VITALS
HEIGHT: 77 IN | HEART RATE: 87 BPM | OXYGEN SATURATION: 96 % | WEIGHT: 210 LBS | DIASTOLIC BLOOD PRESSURE: 66 MMHG | BODY MASS INDEX: 24.79 KG/M2 | SYSTOLIC BLOOD PRESSURE: 140 MMHG

## 2019-11-22 DIAGNOSIS — F99 INSOMNIA DUE TO OTHER MENTAL DISORDER: ICD-10-CM

## 2019-11-22 DIAGNOSIS — G47.00 INSOMNIA, UNSPECIFIED TYPE: ICD-10-CM

## 2019-11-22 DIAGNOSIS — G25.81 RLS (RESTLESS LEGS SYNDROME): Chronic | ICD-10-CM

## 2019-11-22 DIAGNOSIS — G25.81 RESTLESS LEGS SYNDROME (RLS): ICD-10-CM

## 2019-11-22 DIAGNOSIS — G47.33 OBSTRUCTIVE SLEEP APNEA: Primary | ICD-10-CM

## 2019-11-22 DIAGNOSIS — F51.05 INSOMNIA DUE TO OTHER MENTAL DISORDER: ICD-10-CM

## 2019-11-22 PROCEDURE — G0463 HOSPITAL OUTPT CLINIC VISIT: HCPCS

## 2019-11-22 RX ORDER — TRAZODONE HYDROCHLORIDE 100 MG/1
100 TABLET ORAL NIGHTLY
Qty: 30 TABLET | Refills: 5 | Status: SHIPPED | OUTPATIENT
Start: 2019-11-22 | End: 2020-05-20

## 2019-11-22 NOTE — PROGRESS NOTES
[facility] SLEEP MEDICINE  [address] [facility]  [address] [facility] [address]  [phone]    PCP: Brandin Bolton MD    Reason for visit:  Sleep disorders: JAI    Jose is a 69 y.o.male who was seen in the [facility] today. He is not able to sleep consistently. Wakes up after 1 hr or so. Then does work around house and then may sleep in recliner. Somewhat sleepy during day. Sleeps 10pm to 5am. Using nasal mask, fits well. No discomfort. Dry mouth but not from CPAP pressures. Mask leaks some. He changes mask regularly. He takes Trazodone but does not keep him asleep. He takes pramipexole 1.5 mg qhs for RLS. It helps a lot.  Rice Sleepiness Scale is 13. Caffeine 0 per day. Alcohol 2-3 per week.    Jose  reports that he quit smoking about 45 years ago. His smoking use included cigarettes. He has a 6.00 pack-year smoking history. He has never used smokeless tobacco.    Pertinent Positive Review of Systems of nasal congestion  Rest of Review of Systems was negative as recorded in Sleep Questionnaire.    Patient  has a past medical history of Anemia, Anti-phospholipid syndrome (CMS/HCC), Bladder disorder, Community acquired pneumonia, Deep venous thrombosis (CMS/HCC) (2006, 2008), Depression, Esophageal carcinoma (CMS/HCC) (12/31/2014), Hemorrhoids, HH (hiatus hernia), History of atrial fibrillation (2015), History of kidney stones, History of nephrolithiasis, History of pancreatitis, History of radiation therapy, Hypertension, Long-term (current) use of anticoagulants, INR goal 2.0-3.0, Malignant neoplasm of prostate (CMS/HCC), Other hyperlipidemia (1/30/2018), Restless legs syndrome, and Squamous carcinoma.     Current Medications:    Current Outpatient Medications:   •  ferrous sulfate 325 (65 FE) MG tablet, TAKE ONE TABLET BY MOUTH DAILY WITH BREAKFAST, Disp: 90 tablet, Rfl: 0  •  PARoxetine (PAXIL) 30 MG tablet, Take 1 tablet by mouth Every Morning., Disp: 90 tablet, Rfl: 1  •   "pramipexole (MIRAPEX) 1.5 MG tablet, Take 1 tablet by mouth 2 (Two) Times a Day., Disp: 180 tablet, Rfl: 1  •  SHINGRIX 50 MCG/0.5ML reconstituted suspension, , Disp: , Rfl:   •  tamsulosin (FLOMAX) 0.4 MG capsule 24 hr capsule, Take 1 capsule by mouth Every Night., Disp: , Rfl:   •  traZODone (DESYREL) 100 MG tablet, Take 1 tablet by mouth Every Night for 180 days., Disp: 30 tablet, Rfl: 5  •  warfarin (COUMADIN) 5 MG tablet, Take 1 tablet by mouth Daily., Disp: 90 tablet, Rfl: 2    Current Facility-Administered Medications:   •  cyanocobalamin injection 1,000 mcg, 1,000 mcg, Intramuscular, Q28 Days, Brandin Bolton MD, 1,000 mcg at 10/30/19 0959   also entered in Sleep Questionnaire         Vital Signs: /66   Pulse 87   Ht 195.6 cm (77\")   Wt 95.3 kg (210 lb)   SpO2 96%   BMI 24.90 kg/m²     Body mass index is 24.9 kg/m².       Tongue: large      Dentition: good       Pharynx: Posterior pharyngeal pillars are wide   Mallampatti: III (soft and hard palate and base of uvula visible)        General: Alert. Cooperative. Well developed. No acute distress.             Head:  Normocephalic. Symmetrical. Atraumatic.              Nose: No septal deviation. No drainage.          Throat: No oral lesions. No thrush. Moist mucous membranes.    Chest Wall:  Normal shape. Symmetric expansion with respiration. No tenderness.             Neck:  Trachea midline.           Lungs:  Clear to auscultation bilaterally. No wheezes. No rhonchi. No rales. Respirations regular, even and unlabored.            Heart:  Regular rhythm and normal rate. Normal S1 and S2. No murmur.     Abdomen:  Soft, non-tender and non-distended. Normal bowel sounds. No masses.  Extremities:  Moves all extremities well. No edema.    Psychiatric: Normal mood and affect.    Study:  · 11/8/18  Overnight split polysomnogram study.  Diagnostic study from 10 PM to 11:35 PM.  Sleep efficiency 74.6% with 1.18 hours total sleep time.  Sleep distribution showed " absence of slow-wave sleep and REM sleep.  AHI index is 38.9 with RDI of 51.6.  This indicates severe obstructive sleep apnea.  Patient slept supine for the entire portion of the diagnostic study.  Due to absence of REM sleep severity may be underestimated.  Oxygen saturation remained above 88%.  Arousal index is 53 events an hour.  Patient had 6.56% time snoring.  Titration study from 11:35 PM to 5:15 AM.  Sleep efficiency was poor at 46.9% with 2.66 hours total sleep time.  Some slow-wave sleep and REM sleep was seen during the titration study.  AHI index improved with application of positive airway pressure device.  Oxygen saturation remained above 88%.  CPAP increased from 5-14 cm water pressure.  Maximum sleep at 6 cm water pressure.  However this is not adequate to completely correct the obstructive sleep apnea.  CPAP was titrated to a maximum of 14 cm water pressure.    Testing:  · 97.8% compliance.  Average usage only 1-1/2 hours.  AHI 4.0.  Auto CPAP between 12 and 13 cm.  Auto CPAP is set between 12 and 15 cm.    [facility]: [employer]    Impression:  1. Obstructive sleep apnea    2. Insomnia due to other mental disorder    3. RLS (restless legs syndrome)    4. Insomnia, unspecified type    5. Restless legs syndrome (RLS)        Plan:  Jose is not using long enough though he puts the device on every night. Encouraged to use nightly and for entire sleep time.  He was more compliant back in February of this year.  Since then he has slacked off.  He does not have any specific issues with the device.    Insomnia main problem per the patient. Increase trazodone to 100 mg qhs to see if helps.    RLS controlled on pramipexole 3.0 mg qhs. However that is a very high dose. He needs to dw PCP about coming down to 1-1.5 mg qhs. He has iron def and anemia - adding iron will help RLS, he will dw PCP (previously on ferrous sulfate).    I reiterated the importance of effective treatment of obstructive sleep apnea with PAP  machine.  Cardiovascular health risks of untreated sleep apnea were again reviewed.  Patient was asked to remain cautious if there is persistent hypersomnolence. The benefit of weight loss in reducing severity of obstructive sleep apnea was discussed.  Patient would benefit from adhering to a strict diet to achieve ideal BMI.     Change of PAP supplies regularly is important for effective use.  Change of cushion on the mask or plugs on nasal pillows along with disposable filters once every month and change of mask frame, tubing, headgear and Velcro straps every 6 months at the minimum was reiterated.    Patient will follow up in this clinic in 6 months  APRN.    Thank you for allowing me to participate in your patient's care.    Stephon Sommers MD    Part of this note may be an electronic transcription/translation of spoken language to printed text using the Dragon Dictation System.

## 2019-11-27 ENCOUNTER — CLINICAL SUPPORT (OUTPATIENT)
Dept: INTERNAL MEDICINE | Age: 69
End: 2019-11-27

## 2019-11-27 ENCOUNTER — OFFICE VISIT (OUTPATIENT)
Dept: INTERNAL MEDICINE | Age: 69
End: 2019-11-27

## 2019-11-27 ENCOUNTER — ANTICOAGULATION VISIT (OUTPATIENT)
Dept: INTERNAL MEDICINE | Age: 69
End: 2019-11-27

## 2019-11-27 VITALS
HEART RATE: 68 BPM | BODY MASS INDEX: 24.79 KG/M2 | HEIGHT: 77 IN | OXYGEN SATURATION: 99 % | DIASTOLIC BLOOD PRESSURE: 62 MMHG | SYSTOLIC BLOOD PRESSURE: 130 MMHG | TEMPERATURE: 97.3 F | WEIGHT: 210 LBS

## 2019-11-27 DIAGNOSIS — D68.61 ANTI-PHOSPHOLIPID SYNDROME (HCC): Primary | Chronic | ICD-10-CM

## 2019-11-27 DIAGNOSIS — D68.61 ANTI-PHOSPHOLIPID SYNDROME (HCC): ICD-10-CM

## 2019-11-27 LAB — INR PPP: 6.8 (ref 2–3)

## 2019-11-27 PROCEDURE — 99213 OFFICE O/P EST LOW 20 MIN: CPT | Performed by: INTERNAL MEDICINE

## 2019-11-27 PROCEDURE — 36416 COLLJ CAPILLARY BLOOD SPEC: CPT | Performed by: INTERNAL MEDICINE

## 2019-11-27 PROCEDURE — 96372 THER/PROPH/DIAG INJ SC/IM: CPT | Performed by: INTERNAL MEDICINE

## 2019-11-27 PROCEDURE — 85610 PROTHROMBIN TIME: CPT | Performed by: INTERNAL MEDICINE

## 2019-11-27 RX ADMIN — CYANOCOBALAMIN 1000 MCG: 1000 INJECTION, SOLUTION INTRAMUSCULAR; SUBCUTANEOUS at 12:42

## 2019-11-27 NOTE — PROGRESS NOTES
[facility] [place] INTERNAL MEDICINE  RICHARDSON BOLTON M.D.      Jose FITCH Hurtado / 69 y.o. / male  11/27/2019      CC:  Main reason(s) for today's visit: high INR      HPI:     Taking warfarin for APS. INR today was 6.8.   Lab Results   Component Value Date    INR 6.80 (A) 11/27/2019    INR 1.70 (A) 10/30/2019    INR 4.00 (A) 09/30/2019    PROTIME 22.7 (H) 04/19/2019    PROTIME 22.4 (H) 04/18/2019    PROTIME 21.2 (H) 04/18/2019     No headaches or bleeding/bruising.     Only change in medications: taking 2 tylenols at night over last 3 days.   No recent prescription change.       Patient Care Team:  Brandin Bolton MD as PCP - General (Internal Medicine)  Code, George THORPE II, MD as Consulting Physician (Hematology and Oncology)  Jose Inman MD as Consulting Physician (Urology)  Mulugeta Millan MD as Consulting Physician (Gastroenterology)  eSth Randhawa MD as Consulting Physician (Ophthalmology)    ASSESSMENT & PLAN:    1. Anti-phospholipid syndrome (CMS/HCC)      No orders of the defined types were placed in this encounter.    No orders of the defined types were placed in this encounter.       Summary/Discussion:  COUMADIN INSTRUCTIONS:    1. HOLD COUMADIN WED, THU, AND FRIDAY.   2. RESUME COUMADIN ON SATURDAY AT 5 MG DAILY.   3. COME IN FOR INR ON MONDAY.         Next Appointment with me: 3/24/2020    No Follow-up on file.    ____________________________________________________________________    MEDICATIONS  Current Outpatient Medications   Medication Sig Dispense Refill   • ferrous sulfate 325 (65 FE) MG tablet TAKE ONE TABLET BY MOUTH DAILY WITH BREAKFAST (Patient taking differently: TAKE TWO TABLET BY MOUTH DAILY WITH BREAKFAST) 90 tablet 0   • PARoxetine (PAXIL) 30 MG tablet Take 1 tablet by mouth Every Morning. 90 tablet 1   • pramipexole (MIRAPEX) 1.5 MG tablet Take 1 tablet by mouth 2 (Two) Times a Day. 180 tablet 1   • tamsulosin (FLOMAX) 0.4 MG capsule 24 hr capsule Take 1 capsule by mouth Every Night.     •  "traZODone (DESYREL) 100 MG tablet Take 1 tablet by mouth Every Night for 180 days. 30 tablet 5   • warfarin (COUMADIN) 5 MG tablet Take 1 tablet by mouth Daily. (Patient taking differently: Take 5 mg by mouth Daily. 5 MG 5 DAYS A WEEK AND 2.5MG 2 DAYS A WEEK) 90 tablet 2     Current Facility-Administered Medications   Medication Dose Route Frequency Provider Last Rate Last Dose   • cyanocobalamin injection 1,000 mcg  1,000 mcg Intramuscular Q28 Days Brandin Bolton MD   1,000 mcg at 10/30/19 0959          ____________________________________________________________________      REVIEW OF SYSTEMS    Review of Systems  No bruising  No melena or blood in stool     VITALS    Visit Vitals  /62   Pulse 68   Temp 97.3 °F (36.3 °C)   Ht 195.6 cm (77\")   Wt 95.3 kg (210 lb)   SpO2 99%   BMI 24.90 kg/m²       BP Readings from Last 3 Encounters:   11/27/19 130/62   11/22/19 140/66   11/08/19 145/68     Wt Readings from Last 3 Encounters:   11/27/19 95.3 kg (210 lb)   11/22/19 95.3 kg (210 lb)   11/21/19 95.3 kg (210 lb)      Body mass index is 24.9 kg/m².    PHYSICAL EXAMINATION    Physical Exam  No acute distress   Alert and normal judgment       REVIEWED DATA:    Labs:     Lab Results   Component Value Date    INR 6.80 (A) 11/27/2019    INR 1.70 (A) 10/30/2019    INR 4.00 (A) 09/30/2019    PROTIME 22.7 (H) 04/19/2019    PROTIME 22.4 (H) 04/18/2019    PROTIME 21.2 (H) 04/18/2019      Imaging:        Medical Tests:       Summary of old records / correspondence / consultant report:        Request outside records:       ALLERGIES  No Known Allergies     PFSH:     The following portions of the patient's history were reviewed and updated as appropriate: Allergies / Current Medications / Past Medical History / Surgical History / Social History / Family History    PROBLEM LIST   Patient Active Problem List   Diagnosis   • Adenocarcinoma of esophagus (CMS/HCC)   • Adenomatous polyp of colon   • Malignant neoplasm of prostate " (CMS/HCC)   • RLS (restless legs syndrome)   • Depression   • Hypertension   • Anti-phospholipid syndrome (CMS/HCC)   • Anemia   • Insomnia due to other mental disorder   • Peripheral polyneuropathy   • B12 deficiency   • Postsurgical malabsorption       PAST MEDICAL HISTORY  Past Medical History:   Diagnosis Date   • Anemia    • Anti-phospholipid syndrome (CMS/HCC)     On lifelong anticoagulation therapy   • Bladder disorder     LEAKAGE   ON MED  wers pads   • Community acquired pneumonia     HISTORY OF IN 2014   • Deep venous thrombosis (CMS/HCC) 2006, 2008    Left lower extremity multiple   • Depression    • Esophageal carcinoma (CMS/HCC) 12/31/2014    had chemo and radiation prior surgery   • Hemorrhoids    • HH (hiatus hernia)    • History of atrial fibrillation 2015    ONE EPISODE WHILE HOSPITALIZED   • History of kidney stones    • History of nephrolithiasis    • History of pancreatitis     PT STATES MANY YEARS AGO   • History of radiation therapy    • Hypertension    • Long-term (current) use of anticoagulants, INR goal 2.0-3.0    • Malignant neoplasm of prostate (CMS/HCC)    • Other hyperlipidemia 1/30/2018 January 30, 2018 lipid panel risk 12.8%   • Restless legs syndrome    • Squamous carcinoma     on the head       SURGICAL HISTORY  Past Surgical History:   Procedure Laterality Date   • APPENDECTOMY  1950   • BRONCHOSCOPY      (Diagnostic)   • CATARACT EXTRACTION Bilateral 2014   • COLONOSCOPY  12/15/2014    Complete / Description: EH, IH, torts, stool, follow-up colonoscopy due in 5 years.   • COLONOSCOPY N/A 6/13/2017    non-thrombosed external hemorrhoids, normal examined ileum, IH   • COLONOSCOPY N/A 2/26/2019    Procedure: COLONOSCOPY with Cold Polypectomy;  Surgeon: Mulugeta Millan MD;  Location: [facility] ENDOSCOPY;  Service: Gastroenterology   • CYSTOSCOPY W/ LASER LITHOTRIPSY     • ENDOSCOPY N/A 6/13/2017    Procedure: ESOPHAGOGASTRODUODENOSCOPY WITH COLD BIOPSY;  Surgeon: Lake ALVARADO  MD Carlos;  Location: [facility] ENDOSCOPY;  Service:    • ESOPHAGECTOMY      2015, stage IIB esophageal carcinoma, sub-total resection.   • ESOPHAGECTOMY      Esophagectomy Subtotal Andrea Joe Procedure   • EXCISION LESION  2012    Removal of Squamous Cell CA on Head   • HAMMER TOE REPAIR  2014    Hammertoe Operation (Each Toe), 10/2014   • HAMMER TOE REPAIR Left 10/3/2017    Procedure: Left second third and fourth distal interphalangeal joint resection with flexor tenotomy;  Surgeon: Mulugeta Lira MD;  Location: [facility] OSC;  Service:    • HERNIA REPAIR      incisional   • JEJUNOSTOMY      Laparoscopic   • JEJUNOSTOMY      tube removal    • KNEE SURGERY Bilateral , ,    • PATELLA SURGERY Left     removed   • PILONIDAL CYST / SINUS EXCISION     • IA TOTAL KNEE ARTHROPLASTY Right 3/26/2018    Procedure: TOTAL KNEE ARTHROPLASTY;  Surgeon: Renny Solis MD;  Location: [facility] OR;  Service: Orthopedics   • PROSTATECTOMY     • PYLOROPLASTY     • SPINAL FUSION  1998    C 5,6   • TONSILLECTOMY     • TONSILLECTOMY     • UPPER GASTROINTESTINAL ENDOSCOPY  12/15/2014    LA Grade D esophagitis, Pardo's, HH, multiple duodenal ulcers   • VENTRAL/INCISIONAL HERNIA REPAIR N/A 2016    Procedure: VENTRAL/INCISIONAL HERNIA REPAIR, open, with mesh, and component separation;  Surgeon: Darren Rivas MD;  Location: [facility] OR;  Service:        SOCIAL HISTORY  Social History     Socioeconomic History   • Marital status:      Spouse name: Lena   • Number of children: 0   • Years of education: College   • Highest education level: Not on file   Occupational History   • Occupation: Retired      Comment: Retired    Tobacco Use   • Smoking status: Former Smoker     Packs/day: 2.00     Years: 3.00     Pack years: 6.00     Types: Cigarettes     Last attempt to quit: 1974     Years since quittin.9   • Smokeless tobacco: Never Used    Substance and Sexual Activity   • Alcohol use: Yes     Frequency: 2-3 times a week     Comment: 2-3 x per week   • Drug use: No   • Sexual activity: Defer   Social History Narrative    self-employed        FAMILY HISTORY  Family History   Problem Relation Age of Onset   • Cerebral aneurysm Mother         cerebral artery aneurysm ( age 56)   • Prostate cancer Brother 68   • Anxiety disorder Father    • Suicide Attempts Father          of suicide   • Cancer Father         bladder   • No Known Problems Brother    • Colon cancer Neg Hx    • Esophageal cancer Neg Hx    • Dementia Neg Hx          **Dragon Disclaimer:   Much of this encounter note is an electronic transcription/translation of spoken language to printed text. The electronic translation of spoken language may permit erroneous, or at times, nonsensical words or phrases to be inadvertently transcribed. Although I have reviewed the note for such errors, some may still exist.       Template created by Moose Bolton MD

## 2019-11-27 NOTE — PATIENT INSTRUCTIONS
** IMPORTANT MESSAGE FROM DR. HEALY **    In our office, your satisfaction is VERY important to us.     You may receive a survey from [employer] by mail or E-mail for you to provide feedback about your visit. This information is invaluable for me to know what we can do to improve our services.     I ask that you please take a few minutes to complete the survey and let us know how we are doing in serving your needs. (You may receive the survey more than once for multiple visits)    Thank You !    Dr. Healy & Staff    __________________________________________________________      ADDITIONAL INSTRUCTION / REMINDERS FROM DR. HEALY    COUMADIN INSTRUCTIONS:    1. HOLD COUMADIN WED, THU, AND FRIDAY.   2. RESUME COUMADIN ON SATURDAY AT 5 MG DAILY.   3. COME IN FOR INR ON MONDAY.

## 2019-12-02 ENCOUNTER — ANTICOAGULATION VISIT (OUTPATIENT)
Dept: INTERNAL MEDICINE | Age: 69
End: 2019-12-02

## 2019-12-02 DIAGNOSIS — D68.61 ANTI-PHOSPHOLIPID SYNDROME (HCC): ICD-10-CM

## 2019-12-02 LAB — INR PPP: 1.8 (ref 0.9–1.1)

## 2019-12-02 PROCEDURE — 93793 ANTICOAG MGMT PT WARFARIN: CPT | Performed by: INTERNAL MEDICINE

## 2019-12-02 PROCEDURE — 36416 COLLJ CAPILLARY BLOOD SPEC: CPT | Performed by: INTERNAL MEDICINE

## 2019-12-02 PROCEDURE — 85610 PROTHROMBIN TIME: CPT | Performed by: INTERNAL MEDICINE

## 2019-12-03 NOTE — PROGRESS NOTES
Call patient with INR result:    HOLD COUMADIN WED, THU, AND FRIDAY. RESUME SAT at 5 mg daily.  COME IN ON MONDAY 12.02.19    INR is below therapeutic level. Continue coumadin at 5 mg daily. Recheck INR in 1 week.    Lab Results       Component                Value               Date                       INR                      1.80 (A)            12/02/2019                 INR                      6.80 (A)            11/27/2019                 INR                      1.70 (A)            10/30/2019

## 2019-12-09 ENCOUNTER — CLINICAL SUPPORT (OUTPATIENT)
Dept: INTERNAL MEDICINE | Age: 69
End: 2019-12-09

## 2019-12-09 ENCOUNTER — ANTICOAGULATION VISIT (OUTPATIENT)
Dept: INTERNAL MEDICINE | Age: 69
End: 2019-12-09

## 2019-12-09 DIAGNOSIS — D68.61 ANTI-PHOSPHOLIPID SYNDROME (HCC): ICD-10-CM

## 2019-12-09 LAB
INR PPP: 4.5 (ref 2–3)
INR PPP: 5 (ref 2–3)

## 2019-12-09 PROCEDURE — 93793 ANTICOAG MGMT PT WARFARIN: CPT | Performed by: INTERNAL MEDICINE

## 2019-12-09 PROCEDURE — 85610 PROTHROMBIN TIME: CPT | Performed by: INTERNAL MEDICINE

## 2019-12-09 PROCEDURE — 36416 COLLJ CAPILLARY BLOOD SPEC: CPT | Performed by: INTERNAL MEDICINE

## 2019-12-09 NOTE — PROGRESS NOTES
Call patient with INR result:    Coumadin held 12/8/19. Previous dosage 5 mg daily    INR is above therapeutic level. Hold 2 doses of coumadin. Resume at 4 mg daily. Recheck INR in 1 week.   Verify whether he is taking any new medication (including OTC) or had a change in his diet.   Review dietary information carefully with him.     Lab Results       Component                Value               Date                       INR                      4.50 (A)            12/09/2019                 INR                      5.00 (A)            12/08/2019                 INR                      1.80 (A)            12/02/2019

## 2019-12-13 ENCOUNTER — LAB (OUTPATIENT)
Dept: OTHER | Facility: HOSPITAL | Age: 69
End: 2019-12-13

## 2019-12-13 DIAGNOSIS — C15.9 ADENOCARCINOMA OF ESOPHAGUS (HCC): ICD-10-CM

## 2019-12-13 LAB
BASOPHILS # BLD AUTO: 0.02 10*3/MM3 (ref 0–0.2)
BASOPHILS NFR BLD AUTO: 0.7 % (ref 0–1.5)
DEPRECATED RDW RBC AUTO: 50.8 FL (ref 37–54)
EOSINOPHIL # BLD AUTO: 0.17 10*3/MM3 (ref 0–0.4)
EOSINOPHIL NFR BLD AUTO: 5.9 % (ref 0.3–6.2)
ERYTHROCYTE [DISTWIDTH] IN BLOOD BY AUTOMATED COUNT: 14.6 % (ref 12.3–15.4)
FERRITIN SERPL-MCNC: 708.1 NG/ML (ref 30–400)
HCT VFR BLD AUTO: 33.1 % (ref 37.5–51)
HGB BLD-MCNC: 10.4 G/DL (ref 13–17.7)
HGB RETIC QN AUTO: 31.8 PG (ref 29.8–36.1)
IMM GRANULOCYTES # BLD AUTO: 0 10*3/MM3 (ref 0–0.05)
IMM GRANULOCYTES NFR BLD AUTO: 0 % (ref 0–0.5)
IMM RETICS NFR: 8.9 % (ref 3–15.8)
IRON 24H UR-MRATE: 41 MCG/DL (ref 59–158)
IRON SATN MFR SERPL: 15 % (ref 20–50)
LYMPHOCYTES # BLD AUTO: 0.86 10*3/MM3 (ref 0.7–3.1)
LYMPHOCYTES NFR BLD AUTO: 29.8 % (ref 19.6–45.3)
MCH RBC QN AUTO: 29.6 PG (ref 26.6–33)
MCHC RBC AUTO-ENTMCNC: 31.4 G/DL (ref 31.5–35.7)
MCV RBC AUTO: 94.3 FL (ref 79–97)
MONOCYTES # BLD AUTO: 0.36 10*3/MM3 (ref 0.1–0.9)
MONOCYTES NFR BLD AUTO: 12.5 % (ref 5–12)
NEUTROPHILS # BLD AUTO: 1.48 10*3/MM3 (ref 1.7–7)
NEUTROPHILS NFR BLD AUTO: 51.1 % (ref 42.7–76)
NRBC BLD AUTO-RTO: 0 /100 WBC (ref 0–0.2)
PLATELET # BLD AUTO: 164 10*3/MM3 (ref 140–450)
PMV BLD AUTO: 9.1 FL (ref 6–12)
RBC # BLD AUTO: 3.51 10*6/MM3 (ref 4.14–5.8)
RETICS/RBC NFR AUTO: 0.56 % (ref 0.7–1.9)
TIBC SERPL-MCNC: 268 MCG/DL (ref 298–536)
TRANSFERRIN SERPL-MCNC: 180 MG/DL (ref 200–360)
WBC NRBC COR # BLD: 2.89 10*3/MM3 (ref 3.4–10.8)

## 2019-12-13 PROCEDURE — 85025 COMPLETE CBC W/AUTO DIFF WBC: CPT | Performed by: INTERNAL MEDICINE

## 2019-12-13 PROCEDURE — 83540 ASSAY OF IRON: CPT | Performed by: INTERNAL MEDICINE

## 2019-12-13 PROCEDURE — 82728 ASSAY OF FERRITIN: CPT | Performed by: INTERNAL MEDICINE

## 2019-12-13 PROCEDURE — 85046 RETICYTE/HGB CONCENTRATE: CPT | Performed by: INTERNAL MEDICINE

## 2019-12-13 PROCEDURE — 84466 ASSAY OF TRANSFERRIN: CPT | Performed by: INTERNAL MEDICINE

## 2019-12-13 PROCEDURE — 36415 COLL VENOUS BLD VENIPUNCTURE: CPT

## 2019-12-16 ENCOUNTER — TELEPHONE (OUTPATIENT)
Dept: ORTHOPEDIC SURGERY | Facility: CLINIC | Age: 69
End: 2019-12-16

## 2019-12-16 NOTE — TELEPHONE ENCOUNTER
Mr. Hurtado came by the office and is requesting additional PT visits.  He stated that you said if he needed any additional visits you would prescribe.    Please advise

## 2019-12-17 ENCOUNTER — CLINICAL SUPPORT (OUTPATIENT)
Dept: INTERNAL MEDICINE | Age: 69
End: 2019-12-17

## 2019-12-17 DIAGNOSIS — D68.61 ANTI-PHOSPHOLIPID SYNDROME (HCC): ICD-10-CM

## 2019-12-17 LAB — INR PPP: 2.3 (ref 2–3)

## 2019-12-17 PROCEDURE — 85610 PROTHROMBIN TIME: CPT | Performed by: INTERNAL MEDICINE

## 2019-12-17 PROCEDURE — 36416 COLLJ CAPILLARY BLOOD SPEC: CPT | Performed by: INTERNAL MEDICINE

## 2019-12-19 NOTE — PROGRESS NOTES
Subjective .     REASONS FOR FOLLOWUP:  Esophageal cancer, cytopenias     HISTORY OF PRESENT ILLNESS:  The patient is a 69 y.o. year old male  who is here for follow-up with the above-mentioned history.    He has lost some weight but states this was not with effort as he is trying to eat less.    No problems eating.  No pain.  No nausea.  No fever, chills, night sweats.    Past Medical History:   Diagnosis Date   • Anemia    • Anti-phospholipid syndrome (CMS/HCC)     On lifelong anticoagulation therapy   • Bladder disorder     LEAKAGE   ON MED  wers pads   • Community acquired pneumonia     HISTORY OF IN 2014   • Deep venous thrombosis (CMS/HCC) 2006, 2008    Left lower extremity multiple   • Depression    • Esophageal carcinoma (CMS/HCC) 12/31/2014    had chemo and radiation prior surgery   • Hemorrhoids    • HH (hiatus hernia)    • History of atrial fibrillation 2015    ONE EPISODE WHILE HOSPITALIZED   • History of kidney stones    • History of nephrolithiasis    • History of pancreatitis     PT STATES MANY YEARS AGO   • History of radiation therapy    • Hypertension    • Long-term (current) use of anticoagulants, INR goal 2.0-3.0    • Malignant neoplasm of prostate (CMS/HCC)    • Other hyperlipidemia 1/30/2018 January 30, 2018 lipid panel risk 12.8%   • Restless legs syndrome    • Squamous carcinoma     on the head     Past Surgical History:   Procedure Laterality Date   • APPENDECTOMY  1950   • BRONCHOSCOPY      (Diagnostic)   • CATARACT EXTRACTION Bilateral 2014   • COLONOSCOPY  12/15/2014    Complete / Description: EH, IH, torts, stool, follow-up colonoscopy due in 5 years.   • COLONOSCOPY N/A 6/13/2017    non-thrombosed external hemorrhoids, normal examined ileum, IH   • COLONOSCOPY N/A 2/26/2019    Procedure: COLONOSCOPY with Cold Polypectomy;  Surgeon: Mulugeta Millan MD;  Location: [facility] ENDOSCOPY;  Service: Gastroenterology   • CYSTOSCOPY W/ LASER LITHOTRIPSY     • ENDOSCOPY N/A 6/13/2017     Procedure: ESOPHAGOGASTRODUODENOSCOPY WITH COLD BIOPSY;  Surgeon: Lake Gonzalez MD;  Location: [facility] ENDOSCOPY;  Service:    • ESOPHAGECTOMY      April 2015, stage IIB esophageal carcinoma, sub-total resection.   • ESOPHAGECTOMY      Esophagectomy Subtotal Andrea Joe Procedure   • EXCISION LESION  08/2012    Removal of Squamous Cell CA on Head   • HAMMER TOE REPAIR  09/2014    Hammertoe Operation (Each Toe), 10/2014   • HAMMER TOE REPAIR Left 10/3/2017    Procedure: Left second third and fourth distal interphalangeal joint resection with flexor tenotomy;  Surgeon: Mulugeta Lira MD;  Location:  [facility];  Service:    • HERNIA REPAIR      incisional   • JEJUNOSTOMY      Laparoscopic   • JEJUNOSTOMY      tube removal    • KNEE SURGERY Bilateral 1967, 1973, 1981   • PATELLA SURGERY Left     removed   • PILONIDAL CYST / SINUS EXCISION     • NC TOTAL KNEE ARTHROPLASTY Right 3/26/2018    Procedure: TOTAL KNEE ARTHROPLASTY;  Surgeon: Renny Solis MD;  Location: [facility] OR;  Service: Orthopedics   • PROSTATECTOMY  2010   • PYLOROPLASTY     • SPINAL FUSION  02/1998    C 5,6   • TONSILLECTOMY  1955   • TONSILLECTOMY     • UPPER GASTROINTESTINAL ENDOSCOPY  12/15/2014    LA Grade D esophagitis, Pardo's, HH, multiple duodenal ulcers   • VENTRAL/INCISIONAL HERNIA REPAIR N/A 4/14/2016    Procedure: VENTRAL/INCISIONAL HERNIA REPAIR, open, with mesh, and component separation;  Surgeon: Darren Rivas MD;  Location: [facility] OR;  Service:        HEMATOLOGIC/ONCOLOGIC HISTORY:  (History from previous dates can be found in the separate document.)    MEDICATIONS    Current Outpatient Medications:   •  ferrous sulfate 325 (65 FE) MG tablet, TAKE ONE TABLET BY MOUTH DAILY WITH BREAKFAST (Patient taking differently: TAKE TWO TABLET BY MOUTH DAILY WITH BREAKFAST), Disp: 90 tablet, Rfl: 0  •  PARoxetine (PAXIL) 30 MG tablet, Take 1 tablet by mouth Every Morning., Disp: 90 tablet, Rfl: 1  •  pramipexole  (MIRAPEX) 1.5 MG tablet, Take 1 tablet by mouth 2 (Two) Times a Day., Disp: 180 tablet, Rfl: 1  •  tamsulosin (FLOMAX) 0.4 MG capsule 24 hr capsule, Take 1 capsule by mouth Every Night., Disp: , Rfl:   •  traZODone (DESYREL) 100 MG tablet, Take 1 tablet by mouth Every Night for 180 days., Disp: 30 tablet, Rfl: 5  •  warfarin (COUMADIN) 5 MG tablet, Take 1 tablet by mouth Daily. (Patient taking differently: Take 5 mg by mouth Daily. 5 MG 5 DAYS A WEEK AND 2.5MG 2 DAYS A WEEK), Disp: 90 tablet, Rfl: 2    Current Facility-Administered Medications:   •  cyanocobalamin injection 1,000 mcg, 1,000 mcg, Intramuscular, Q28 Days, Brandin Bolton MD, 1,000 mcg at 19 1242    ALLERGIES:   No Known Allergies    SOCIAL HISTORY:       Social History     Socioeconomic History   • Marital status:      Spouse name: Lena   • Number of children: 0   • Years of education: College   • Highest education level: Not on file   Occupational History   • Occupation: Retired      Comment: Retired    Tobacco Use   • Smoking status: Former Smoker     Packs/day: 2.00     Years: 3.00     Pack years: 6.00     Types: Cigarettes     Last attempt to quit: 1974     Years since quittin.9   • Smokeless tobacco: Never Used   Substance and Sexual Activity   • Alcohol use: Yes     Frequency: 2-3 times a week     Comment: 2-3 x per week   • Drug use: No   • Sexual activity: Defer   Social History Narrative    self-employed          FAMILY HISTORY:  Family History   Problem Relation Age of Onset   • Cerebral aneurysm Mother         cerebral artery aneurysm ( age 56)   • Prostate cancer Brother 68   • Anxiety disorder Father    • Suicide Attempts Father          of suicide   • Cancer Father         bladder   • No Known Problems Brother    • Colon cancer Neg Hx    • Esophageal cancer Neg Hx    • Dementia Neg Hx        REVIEW OF SYSTEMS:    Review of Systems   Constitutional: Negative for activity change.    HENT: Negative for nosebleeds and trouble swallowing.    Respiratory: Negative for shortness of breath and wheezing.    Cardiovascular: Negative for chest pain and palpitations.   Gastrointestinal: Negative for constipation and diarrhea.   Genitourinary: Negative for dysuria and hematuria.   Musculoskeletal: Positive for back pain. Negative for arthralgias and myalgias.   Neurological: Negative for seizures and syncope.   Hematological: Negative for adenopathy. Does not bruise/bleed easily.   Psychiatric/Behavioral: Negative for confusion.           Objective    There were no vitals filed for this visit.  Current Status 10/25/2019   ECOG score 0      PHYSICAL EXAM:    CONSTITUTIONAL:  Vital signs reviewed.  No distress, looks comfortable.  Pleasant man  EYES:  Conjunctiva and lids unremarkable.  PERRLA  EARS,NOSE,MOUTH,THROAT:  Ears and nose appear unremarkable.  Lips, teeth, gums appear unremarkable.  RESPIRATORY:  Normal respiratory effort.  Lungs clear to auscultation bilaterally.  CARDIOVASCULAR:  Normal S1, S2.  No murmurs rubs or gallops.  Left leg significantly larger than right, chronic since DVT  GASTROINTESTINAL: Abdomen appears unremarkable.  Nontender.  No hepatomegaly.  No splenomegaly.  LYMPHATIC:  No cervical, supraclavicular, axillary lymphadenopathy.  SKIN:  Warm.  No rashes.  PSYCHIATRIC:  Normal judgment and insight.  Normal mood and affect.      RECENT LABS:        WBC   Date/Time Value Ref Range Status   12/13/2019 10:18 AM 2.89 (L) 3.40 - 10.80 10*3/mm3 Final   04/15/2019 12:31 PM 3.53 3.40 - 10.80 10*3/mm3 Final   04/16/2018 04:25 PM 4.23 (L) 4.5 - 11.0 10*3/uL Final     Hemoglobin   Date/Time Value Ref Range Status   12/13/2019 10:18 AM 10.4 (L) 13.0 - 17.7 g/dL Final   04/16/2018 04:25 PM 9.9 (L) 13.5 - 17.5 g/dL Final     Platelets   Date/Time Value Ref Range Status   12/13/2019 10:18  140 - 450 10*3/mm3 Final   04/16/2018 04:25  140 - 440 10*3/uL Final       Assessment/Plan      ASSESSMENT:  There are no diagnoses linked to this encounter.    *Esophageal adenocarcinoma. Initially T2N0M0. After neoadjuvant carboplatin/Taxol with radiation, achieved a pathologic CR at resection, 4/24/2015.   · As per NCCN guidelines, plan CAT scans every 6 months x1 year, then every 6 to 9 months on years 2 and years 3. Defer any surveillance EGDs to Dr. Gonzalez if he feels they are appropriate.   · Also as per NCCN guidelines, plan H and P every 3 to 6 months on years 1 and years 2, then every 6 to 12 months' time on years 3 through years 5 and then annually.   · EGD 6/13/17 by Dr. Gonzalez: No evidence of recurrence.  · CT scan 3/2/18 (this completed 3 years of surveillance CT): No evidence of recurrence.  Plan no more surveillance CT.  No evidence of recurrence.  Remission continues.    *Recurrent DVT, prior to cancer diagnosis.    · Dr. Bolton manages his Coumadin.   · Chronic left leg larger than right, since DVT  No evidence of recurrent clots.    *Leukocytopenia, anemia, thrombocytopenia.   ·  He had a white blood cell count in the upper 3s and a hemoglobin in the upper 12s with a normal platelet count prior to beginning chemotherapy. We will monitor this.   Counts remain low but similar to baseline.    *Persistent cough.  He has seen Dr. Maher Sayied for this.    He did not complain of this today.    *Previously complained of emesis occurring 5 hours after eating on average once every month or 2.  No nausea otherwise.  Denies dysphagia or odynophagia.    Unchanged.  Occurring on average once every couple of months.  He did not complain of this today    *Iron deficiency anemia  · Hb mostly around 11  · On 4/15/2019, ferritin 29.7, 13% saturation.  Serum folate normal.  · On oral iron daily through PCP.  · On 8/23/19, ferritin 65, 14%.   · Increased oral iron.   · On 10/15/19, ferritin 72, 10%.  · On 10/25/2019, stopped oral iron since it was not helping.    · 2 doses Injectafer.  · On 12/13/2019, ferritin  708, 15% saturation, Hb 10.4.  · Therefore, no IV iron.  Monitor.    *Source of iron deficiency.  · Last colonoscopy 2/26/2019 by Dr. Millan.  Last EGD 6/13/2017 by Dr. Gonzalez.  · Suspect he has an absorption issue due to previous esophageal surgery.    *B12 deficiency.  · On 6/6/2019, B12 <150  · On B12 injections through PCP.  Today he states he remains on B12 injections.    PLAN:  · M.D. 6 months.  1 week prior: Iron labs.  · No more surveillance CT scans.  · He does not have a port.    Long discussion today about his persistent cytopenias.  I told him at some point we may want to do a bone marrow biopsy if numbers should significantly drop.    Wife assisted with history

## 2019-12-20 ENCOUNTER — OFFICE VISIT (OUTPATIENT)
Dept: ONCOLOGY | Facility: CLINIC | Age: 69
End: 2019-12-20

## 2019-12-20 ENCOUNTER — APPOINTMENT (OUTPATIENT)
Dept: OTHER | Facility: HOSPITAL | Age: 69
End: 2019-12-20

## 2019-12-20 VITALS
HEIGHT: 77 IN | TEMPERATURE: 97.5 F | RESPIRATION RATE: 16 BRPM | HEART RATE: 68 BPM | BODY MASS INDEX: 24.29 KG/M2 | OXYGEN SATURATION: 100 % | SYSTOLIC BLOOD PRESSURE: 150 MMHG | DIASTOLIC BLOOD PRESSURE: 75 MMHG | WEIGHT: 205.7 LBS

## 2019-12-20 DIAGNOSIS — C15.9 ADENOCARCINOMA OF ESOPHAGUS (HCC): Primary | ICD-10-CM

## 2019-12-20 PROCEDURE — 99214 OFFICE O/P EST MOD 30 MIN: CPT | Performed by: INTERNAL MEDICINE

## 2019-12-20 PROCEDURE — G0463 HOSPITAL OUTPT CLINIC VISIT: HCPCS | Performed by: INTERNAL MEDICINE

## 2019-12-27 ENCOUNTER — OFFICE VISIT (OUTPATIENT)
Dept: INTERNAL MEDICINE | Age: 69
End: 2019-12-27

## 2019-12-27 ENCOUNTER — CLINICAL SUPPORT (OUTPATIENT)
Dept: INTERNAL MEDICINE | Age: 69
End: 2019-12-27

## 2019-12-27 VITALS
HEART RATE: 68 BPM | SYSTOLIC BLOOD PRESSURE: 118 MMHG | TEMPERATURE: 97.1 F | BODY MASS INDEX: 24.21 KG/M2 | DIASTOLIC BLOOD PRESSURE: 62 MMHG | WEIGHT: 205 LBS | OXYGEN SATURATION: 100 % | HEIGHT: 77 IN

## 2019-12-27 DIAGNOSIS — D68.61 ANTI-PHOSPHOLIPID SYNDROME (HCC): ICD-10-CM

## 2019-12-27 LAB — INR PPP: 7.1 (ref 2–3)

## 2019-12-27 PROCEDURE — 99214 OFFICE O/P EST MOD 30 MIN: CPT | Performed by: NURSE PRACTITIONER

## 2019-12-27 PROCEDURE — 96372 THER/PROPH/DIAG INJ SC/IM: CPT | Performed by: INTERNAL MEDICINE

## 2019-12-27 PROCEDURE — 85610 PROTHROMBIN TIME: CPT | Performed by: NURSE PRACTITIONER

## 2019-12-27 PROCEDURE — 36416 COLLJ CAPILLARY BLOOD SPEC: CPT | Performed by: NURSE PRACTITIONER

## 2019-12-27 RX ADMIN — CYANOCOBALAMIN 1000 MCG: 1000 INJECTION, SOLUTION INTRAMUSCULAR; SUBCUTANEOUS at 11:28

## 2019-12-27 NOTE — PROGRESS NOTES
"Jose Hurtado / 69 y.o. / male  Encounter Date: 12/27/2019    ASSESSMENT & PLAN:    Problem List Items Addressed This Visit        Immune and Lymphatic    Anti-phospholipid syndrome (CMS/HCC) (Chronic)    Overview     DVT LLE in 2003 and 2006  Antiphospholipid syndrome  On life-long AC on warfarin.   GOAL INR : 2 -3          Relevant Medications    warfarin (COUMADIN) 5 MG tablet    Other Relevant Orders    POC INR (Completed)        Orders Placed This Encounter   Procedures   • POC INR     No orders of the defined types were placed in this encounter.      Summary/Discussion:  1. Anti-phospholipid syndrome (CMS/HCC)  -Advised patient on Coumadin dosing, reviewed in office today.  Reviewed diet, medications, alcohol intake.  Recheck INR in 1 week.  - POC INR    Return in about 1 week (around 1/3/2020), or if symptoms worsen or fail to improve, for Recheck INR .  ________________________________________________________________    VITALS:    Visit Vitals  /62   Pulse 68   Temp 97.1 °F (36.2 °C) (Temporal)   Ht 195.6 cm (77.01\")   Wt 93 kg (205 lb)   SpO2 100%   BMI 24.30 kg/m²       BP Readings from Last 3 Encounters:   12/27/19 118/62   12/20/19 150/75   11/27/19 130/62     Wt Readings from Last 3 Encounters:   12/27/19 93 kg (205 lb)   12/20/19 93.3 kg (205 lb 11.2 oz)   11/27/19 95.3 kg (210 lb)      Body mass index is 24.3 kg/m².    CC: Main reason(s) for today's visit: Coagulation Disorder (7.1)      HPI    Patient is a 69 y.o. male who is here for INR check, patient was found to be critically elevated at 7.1.  His previous INR 10 days ago was not reviewed or therapy directed.  He has been taking 5 mg of Coumadin once every day.  He has not yet taken his dose for today.  He denies any medication, diet, alcohol changes.  He is only had 1 to 2 glasses of champagne on Yogesh day, otherwise no significant alcohol intake.  He denies any signs of bleeding, or physical symptoms.  He does have dark stools, " however he is taking iron supplementation.    Patient Care Team:  Brandin Bolton MD as PCP - General (Internal Medicine)  Tapan, George THORPE II, MD as Consulting Physician (Hematology and Oncology)  Jose Inman MD as Consulting Physician (Urology)  Mulugeta Millan MD as Consulting Physician (Gastroenterology)  Seth Randhawa MD as Consulting Physician (Ophthalmology)  ____________________________________________________________________    REVIEW OF SYSTEMS    Review of Systems  As noted per HPI  Constitutional neg  Resp neg  CV neg     PHYSICAL EXAMINATION    Physical Exam   Constitutional: He is oriented to person, place, and time. He appears well-developed and well-nourished. No distress.   Cardiovascular: Normal rate and regular rhythm.   Pulmonary/Chest: Effort normal.   Neurological: He is alert and oriented to person, place, and time.   Skin: Skin is warm and dry.   Psychiatric: He has a normal mood and affect.   Vitals reviewed.    REVIEWED DATA:    Labs:   Lab Results   Component Value Date     04/19/2019    K 4.4 04/19/2019    AST 23 04/18/2019    ALT 19 04/18/2019    BUN 20 04/19/2019    CREATININE 0.95 04/19/2019    CREATININE 1.04 04/18/2019    CREATININE 1.12 04/18/2019    EGFRIFNONA 78 04/19/2019    EGFRIFAFRI 97 05/10/2018       Lab Results   Component Value Date    GLUCOSE 88 04/19/2019    GLUCOSE 103 (H) 04/18/2019    GLUCOSE 104 (H) 04/18/2019       Lab Results   Component Value Date    LDL 92 05/10/2018     (H) 01/30/2018    LDL 98 01/26/2017    HDL 45 05/10/2018    TRIG 79 05/10/2018       Lab Results   Component Value Date    TSH 2.050 06/06/2019    FREET4 1.18 06/06/2019          Lab Results   Component Value Date    WBC 2.89 (L) 12/13/2019    HGB 10.4 (L) 12/13/2019    HGB 9.9 (L) 10/15/2019    HGB 10.4 (L) 08/23/2019     12/13/2019         Imaging:      Medical Tests:      Summary of old records / correspondence / consultant report:      Request outside records:    ______________________________________________________________________    ALLERGIES  No Known Allergies     MEDICATIONS  Current Outpatient Medications on File Prior to Visit   Medication Sig   • ferrous sulfate 325 (65 FE) MG tablet TAKE ONE TABLET BY MOUTH DAILY WITH BREAKFAST (Patient taking differently: TAKE TWO TABLET BY MOUTH DAILY WITH BREAKFAST)   • PARoxetine (PAXIL) 30 MG tablet Take 1 tablet by mouth Every Morning.   • pramipexole (MIRAPEX) 1.5 MG tablet Take 1 tablet by mouth 2 (Two) Times a Day.   • tamsulosin (FLOMAX) 0.4 MG capsule 24 hr capsule Take 1 capsule by mouth Every Night.   • traZODone (DESYREL) 100 MG tablet Take 1 tablet by mouth Every Night for 180 days.   • warfarin (COUMADIN) 5 MG tablet Take 1 tablet by mouth Daily. (Patient taking differently: Take 5 mg by mouth Daily. 5 MG 5 DAYS A WEEK AND 2.5MG 2 DAYS A WEEK)     Current Facility-Administered Medications on File Prior to Visit   Medication   • cyanocobalamin injection 1,000 mcg       PFSH:     The following portions of the patient's history were reviewed and updated as appropriate: Allergies / Current Medications / Past Medical History / Surgical History / Social History / Family History    PROBLEM LIST   Patient Active Problem List   Diagnosis   • Adenocarcinoma of esophagus (CMS/HCC)   • Adenomatous polyp of colon   • Malignant neoplasm of prostate (CMS/HCC)   • RLS (restless legs syndrome)   • Depression   • Hypertension   • Anti-phospholipid syndrome (CMS/HCC)   • Anemia   • Insomnia due to other mental disorder   • Peripheral polyneuropathy   • B12 deficiency   • Postsurgical malabsorption       PAST MEDICAL HISTORY  Past Medical History:   Diagnosis Date   • Anemia    • Anti-phospholipid syndrome (CMS/HCC)     On lifelong anticoagulation therapy   • Bladder disorder     LEAKAGE   ON MED  wers pads   • Community acquired pneumonia     HISTORY OF IN 2014   • Deep venous thrombosis (CMS/HCC) 2006, 2008    Left lower extremity  multiple   • Depression    • Esophageal carcinoma (CMS/HCC) 12/31/2014    had chemo and radiation prior surgery   • Hemorrhoids    • HH (hiatus hernia)    • History of atrial fibrillation 2015    ONE EPISODE WHILE HOSPITALIZED   • History of kidney stones    • History of nephrolithiasis    • History of pancreatitis     PT STATES MANY YEARS AGO   • History of radiation therapy    • Hypertension    • Long-term (current) use of anticoagulants, INR goal 2.0-3.0    • Malignant neoplasm of prostate (CMS/HCC)    • Other hyperlipidemia 1/30/2018 January 30, 2018 lipid panel risk 12.8%   • Restless legs syndrome    • Squamous carcinoma     on the head       SURGICAL HISTORY  Past Surgical History:   Procedure Laterality Date   • APPENDECTOMY  1950   • BRONCHOSCOPY      (Diagnostic)   • CATARACT EXTRACTION Bilateral 2014   • COLONOSCOPY  12/15/2014    Complete / Description: EH, IH, torts, stool, follow-up colonoscopy due in 5 years.   • COLONOSCOPY N/A 6/13/2017    non-thrombosed external hemorrhoids, normal examined ileum, IH   • COLONOSCOPY N/A 2/26/2019    Procedure: COLONOSCOPY with Cold Polypectomy;  Surgeon: Mulugeta Millan MD;  Location: [facility] ENDOSCOPY;  Service: Gastroenterology   • CYSTOSCOPY W/ LASER LITHOTRIPSY     • ENDOSCOPY N/A 6/13/2017    Procedure: ESOPHAGOGASTRODUODENOSCOPY WITH COLD BIOPSY;  Surgeon: Lake Gonzalez MD;  Location: [facility] ENDOSCOPY;  Service:    • ESOPHAGECTOMY      April 2015, stage IIB esophageal carcinoma, sub-total resection.   • ESOPHAGECTOMY      Esophagectomy Subtotal [place]acho Joe Procedure   • EXCISION LESION  08/2012    Removal of Squamous Cell CA on Head   • HAMMER TOE REPAIR  09/2014    Hammertoe Operation (Each Toe), 10/2014   • HAMMER TOE REPAIR Left 10/3/2017    Procedure: Left second third and fourth distal interphalangeal joint resection with flexor tenotomy;  Surgeon: Mulugeta Lira MD;  Location: [facility] OR [facility];  Service:    • HERNIA REPAIR       incisional   • JEJUNOSTOMY      Laparoscopic   • JEJUNOSTOMY      tube removal    • KNEE SURGERY Bilateral , ,    • PATELLA SURGERY Left     removed   • PILONIDAL CYST / SINUS EXCISION     • AR TOTAL KNEE ARTHROPLASTY Right 3/26/2018    Procedure: TOTAL KNEE ARTHROPLASTY;  Surgeon: Renny Solis MD;  Location: [facility];  Service: Orthopedics   • PROSTATECTOMY     • PYLOROPLASTY     • SPINAL FUSION  1998    C 5,6   • TONSILLECTOMY     • TONSILLECTOMY     • UPPER GASTROINTESTINAL ENDOSCOPY  12/15/2014    LA Grade D esophagitis, Pardo's, HH, multiple duodenal ulcers   • VENTRAL/INCISIONAL HERNIA REPAIR N/A 2016    Procedure: VENTRAL/INCISIONAL HERNIA REPAIR, open, with mesh, and component separation;  Surgeon: Darren Rivas MD;  Location: [facility];  Service:        SOCIAL HISTORY  Social History     Socioeconomic History   • Marital status:      Spouse name: Lena   • Number of children: 0   • Years of education: College   • Highest education level: Not on file   Occupational History   • Occupation: Retired      Comment: Retired    Tobacco Use   • Smoking status: Former Smoker     Packs/day: 2.00     Years: 3.00     Pack years: 6.00     Types: Cigarettes     Last attempt to quit:      Years since quittin.0   • Smokeless tobacco: Never Used   Substance and Sexual Activity   • Alcohol use: Yes     Frequency: 2-3 times a week     Comment: 2-3 x per week   • Drug use: No   • Sexual activity: Defer   Social History Narrative    self-employed        FAMILY HISTORY  Family History   Problem Relation Age of Onset   • Cerebral aneurysm Mother         cerebral artery aneurysm ( age 56)   • Prostate cancer Brother 68   • Anxiety disorder Father    • Suicide Attempts Father          of suicide   • Cancer Father         bladder   • No Known Problems Brother    • Colon cancer Neg Hx    • Esophageal cancer Neg Hx    •  Dementia Neg Hx

## 2020-01-03 ENCOUNTER — ANTICOAGULATION VISIT (OUTPATIENT)
Dept: INTERNAL MEDICINE | Age: 70
End: 2020-01-03

## 2020-01-03 ENCOUNTER — OFFICE VISIT (OUTPATIENT)
Dept: INTERNAL MEDICINE | Age: 70
End: 2020-01-03

## 2020-01-03 VITALS
HEIGHT: 77 IN | DIASTOLIC BLOOD PRESSURE: 60 MMHG | HEART RATE: 74 BPM | WEIGHT: 199 LBS | BODY MASS INDEX: 23.5 KG/M2 | SYSTOLIC BLOOD PRESSURE: 118 MMHG | OXYGEN SATURATION: 97 % | TEMPERATURE: 97.7 F

## 2020-01-03 DIAGNOSIS — D68.61 ANTI-PHOSPHOLIPID SYNDROME (HCC): ICD-10-CM

## 2020-01-03 LAB
INR PPP: 8 (ref 0.9–1.1)
INR PPP: 8 (ref 2–3)

## 2020-01-03 PROCEDURE — 99213 OFFICE O/P EST LOW 20 MIN: CPT | Performed by: NURSE PRACTITIONER

## 2020-01-03 PROCEDURE — 85610 PROTHROMBIN TIME: CPT | Performed by: NURSE PRACTITIONER

## 2020-01-03 NOTE — ASSESSMENT & PLAN NOTE
Current INR today: 8.0  Coumadin dose instructions: HOLD ALL COUMADIN UNTIL INR RECHECK IN 4 DAYS.  Monitor for bleeding. Avoid alcohol, NSAIDs, tylenol, supplements, or dietary changes.

## 2020-01-03 NOTE — PROGRESS NOTES
"Jose Hurtado / 69 y.o. / male  Encounter Date: 01/03/2020    ASSESSMENT & PLAN:    Problem List Items Addressed This Visit        Immune and Lymphatic    Anti-phospholipid syndrome (CMS/HCC) (Chronic)    Overview     DVT LLE in 2003 and 2006  Antiphospholipid syndrome  On life-long AC on warfarin.   GOAL INR : 2 -3          Current Assessment & Plan     Current INR today: 8.0  Coumadin dose instructions: HOLD ALL COUMADIN UNTIL INR RECHECK IN 4 DAYS.  Monitor for bleeding. Avoid alcohol, NSAIDs, tylenol, supplements, or dietary changes.          Relevant Medications    warfarin (COUMADIN) 5 MG tablet    Other Relevant Orders    POC INR (Completed)        Orders Placed This Encounter   Procedures   • POC INR     No orders of the defined types were placed in this encounter.      Summary/Discussion:  As noted above.     Return in about 4 days (around 1/7/2020) for Recheck INR.  ________________________________________________________________    VITALS:    Visit Vitals  /60   Pulse 74   Temp 97.7 °F (36.5 °C) (Temporal)   Ht 195.6 cm (77.01\")   Wt 90.3 kg (199 lb)   SpO2 97%   BMI 23.59 kg/m²       BP Readings from Last 3 Encounters:   01/03/20 118/60   12/27/19 118/62   12/20/19 150/75     Wt Readings from Last 3 Encounters:   01/03/20 90.3 kg (199 lb)   12/27/19 93 kg (205 lb)   12/20/19 93.3 kg (205 lb 11.2 oz)      Body mass index is 23.59 kg/m².    CC: Main reason(s) for today's visit: Coagulation Disorder      HPI    Patient is a 69 y.o. male who is here for 1 week follow up on INR check after last week's elevated INR of 7.1. He is 8.0 INR today, asymptomatic. He denies bleeding, changes in diet, supplements, or medications. He reports drinking 1 glass of champagne daily since the holiday time. He denies any other interval changes. He has been taking coumadin as prescribed this week: 2.5 mg wed/sat, and 5 mg all other days (which was a decrease from previous dose of 5mg daily).     Patient Care Team:  Che" Brandin Sales MD as PCP - General (Internal Medicine)  George Phelan II, MD as Consulting Physician (Hematology and Oncology)  Jose Inman MD as Consulting Physician (Urology)  Mulugeta Millan MD as Consulting Physician (Gastroenterology)  Seth Randhawa MD as Consulting Physician (Ophthalmology)  ____________________________________________________________________    REVIEW OF SYSTEMS    Review of Systems  As noted per HPI  Constitutional neg  Resp neg  CV neg     PHYSICAL EXAMINATION    Physical Exam   Constitutional: He is oriented to person, place, and time. He appears well-developed and well-nourished. No distress.   Pulmonary/Chest: Effort normal.   Musculoskeletal: Normal range of motion.   Neurological: He is alert and oriented to person, place, and time.   Skin: Skin is warm and dry.   Psychiatric: He has a normal mood and affect.   Vitals reviewed.    REVIEWED DATA:    Labs:   Lab Results   Component Value Date     04/19/2019    K 4.4 04/19/2019    AST 23 04/18/2019    ALT 19 04/18/2019    BUN 20 04/19/2019    CREATININE 0.95 04/19/2019    CREATININE 1.04 04/18/2019    CREATININE 1.12 04/18/2019    EGFRIFNONA 78 04/19/2019    EGFRIFAFRI 97 05/10/2018       Lab Results   Component Value Date    GLUCOSE 88 04/19/2019    GLUCOSE 103 (H) 04/18/2019    GLUCOSE 104 (H) 04/18/2019       Lab Results   Component Value Date    LDL 92 05/10/2018     (H) 01/30/2018    LDL 98 01/26/2017    HDL 45 05/10/2018    TRIG 79 05/10/2018       Lab Results   Component Value Date    TSH 2.050 06/06/2019    FREET4 1.18 06/06/2019          Lab Results   Component Value Date    WBC 2.89 (L) 12/13/2019    HGB 10.4 (L) 12/13/2019    HGB 9.9 (L) 10/15/2019    HGB 10.4 (L) 08/23/2019     12/13/2019         Imaging:      Medical Tests:      Summary of old records / correspondence / consultant report:      Request outside records:    ______________________________________________________________________    ALLERGIES  No Known Allergies     MEDICATIONS  Current Outpatient Medications on File Prior to Visit   Medication Sig   • ferrous sulfate 325 (65 FE) MG tablet TAKE ONE TABLET BY MOUTH DAILY WITH BREAKFAST (Patient taking differently: TAKE TWO TABLET BY MOUTH DAILY WITH BREAKFAST)   • PARoxetine (PAXIL) 30 MG tablet Take 1 tablet by mouth Every Morning.   • pramipexole (MIRAPEX) 1.5 MG tablet Take 1 tablet by mouth 2 (Two) Times a Day.   • tamsulosin (FLOMAX) 0.4 MG capsule 24 hr capsule Take 1 capsule by mouth Every Night.   • traZODone (DESYREL) 100 MG tablet Take 1 tablet by mouth Every Night for 180 days.   • warfarin (COUMADIN) 5 MG tablet Take 1 tablet by mouth Daily. (Patient taking differently: Take 5 mg by mouth Daily. 5 MG 5 DAYS A WEEK AND 2.5MG 2 DAYS A WEEK)     Current Facility-Administered Medications on File Prior to Visit   Medication   • cyanocobalamin injection 1,000 mcg       PFSH:     The following portions of the patient's history were reviewed and updated as appropriate: Allergies / Current Medications / Past Medical History / Surgical History / Social History / Family History    PROBLEM LIST   Patient Active Problem List   Diagnosis   • Adenocarcinoma of esophagus (CMS/HCC)   • Adenomatous polyp of colon   • Malignant neoplasm of prostate (CMS/HCC)   • RLS (restless legs syndrome)   • Depression   • Hypertension   • Anti-phospholipid syndrome (CMS/HCC)   • Anemia   • Insomnia due to other mental disorder   • Peripheral polyneuropathy   • B12 deficiency   • Postsurgical malabsorption       PAST MEDICAL HISTORY  Past Medical History:   Diagnosis Date   • Anemia    • Anti-phospholipid syndrome (CMS/HCC)     On lifelong anticoagulation therapy   • Bladder disorder     LEAKAGE   ON MED  wers pads   • Community acquired pneumonia     HISTORY OF IN 2014   • Deep venous thrombosis (CMS/HCC) 2006, 2008    Left lower extremity  multiple   • Depression    • Esophageal carcinoma (CMS/HCC) 12/31/2014    had chemo and radiation prior surgery   • Hemorrhoids    • HH (hiatus hernia)    • History of atrial fibrillation 2015    ONE EPISODE WHILE HOSPITALIZED   • History of kidney stones    • History of nephrolithiasis    • History of pancreatitis     PT STATES MANY YEARS AGO   • History of radiation therapy    • Hypertension    • Long-term (current) use of anticoagulants, INR goal 2.0-3.0    • Malignant neoplasm of prostate (CMS/HCC)    • Other hyperlipidemia 1/30/2018 January 30, 2018 lipid panel risk 12.8%   • Restless legs syndrome    • Squamous carcinoma     on the head       SURGICAL HISTORY  Past Surgical History:   Procedure Laterality Date   • APPENDECTOMY  1950   • BRONCHOSCOPY      (Diagnostic)   • CATARACT EXTRACTION Bilateral 2014   • COLONOSCOPY  12/15/2014    Complete / Description: EH, IH, torts, stool, follow-up colonoscopy due in 5 years.   • COLONOSCOPY N/A 6/13/2017    non-thrombosed external hemorrhoids, normal examined ileum, IH   • COLONOSCOPY N/A 2/26/2019    Procedure: COLONOSCOPY with Cold Polypectomy;  Surgeon: Mulugeta Millan MD;  Location: [facility] ENDOSCOPY;  Service: Gastroenterology   • CYSTOSCOPY W/ LASER LITHOTRIPSY     • ENDOSCOPY N/A 6/13/2017    Procedure: ESOPHAGOGASTRODUODENOSCOPY WITH COLD BIOPSY;  Surgeon: Lake Gonzalez MD;  Location: [facility] ENDOSCOPY;  Service:    • ESOPHAGECTOMY      April 2015, stage IIB esophageal carcinoma, sub-total resection.   • ESOPHAGECTOMY      Esophagectomy Subtotal [place] Joe Procedure   • EXCISION LESION  08/2012    Removal of Squamous Cell CA on Head   • HAMMER TOE REPAIR  09/2014    Hammertoe Operation (Each Toe), 10/2014   • HAMMER TOE REPAIR Left 10/3/2017    Procedure: Left second third and fourth distal interphalangeal joint resection with flexor tenotomy;  Surgeon: Mulugeta Lira MD;  [place] [facility] [place] [facility] [place]  Service:    • HERNIA REPAIR       incisional   • JEJUNOSTOMY      Laparoscopic   • JEJUNOSTOMY      tube removal    • KNEE SURGERY Bilateral , ,    • PATELLA SURGERY Left     removed   • PILONIDAL CYST / SINUS EXCISION     • NC TOTAL KNEE ARTHROPLASTY Right 3/26/2018    Procedure: TOTAL KNEE ARTHROPLASTY;  Surgeon: Renny Solis MD;  Location: [facility];  Service: Orthopedics   • PROSTATECTOMY     • PYLOROPLASTY     • SPINAL FUSION  1998    C 5,6   • TONSILLECTOMY     • TONSILLECTOMY     • UPPER GASTROINTESTINAL ENDOSCOPY  12/15/2014    LA Grade D esophagitis, Pardo's, HH, multiple duodenal ulcers   • VENTRAL/INCISIONAL HERNIA REPAIR N/A 2016    Procedure: VENTRAL/INCISIONAL HERNIA REPAIR, open, with mesh, and component separation;  Surgeon: Darren Rivas MD;  Location: [facility];  Service:        SOCIAL HISTORY  Social History     Socioeconomic History   • Marital status:      Spouse name: Lena   • Number of children: 0   • Years of education: College   • Highest education level: Not on file   Occupational History   • Occupation: Retired      Comment: Retired    Tobacco Use   • Smoking status: Former Smoker     Packs/day: 2.00     Years: 3.00     Pack years: 6.00     Types: Cigarettes     Last attempt to quit:      Years since quittin.0   • Smokeless tobacco: Never Used   Substance and Sexual Activity   • Alcohol use: Yes     Frequency: 2-3 times a week     Comment: 2-3 x per week   • Drug use: No   • Sexual activity: Defer   Social History Narrative    self-employed        FAMILY HISTORY  Family History   Problem Relation Age of Onset   • Cerebral aneurysm Mother         cerebral artery aneurysm ( age 56)   • Prostate cancer Brother 68   • Anxiety disorder Father    • Suicide Attempts Father          of suicide   • Cancer Father         bladder   • No Known Problems Brother    • Colon cancer Neg Hx    • Esophageal cancer Neg Hx    •  Dementia Neg Hx

## 2020-01-07 ENCOUNTER — OFFICE VISIT (OUTPATIENT)
Dept: INTERNAL MEDICINE | Age: 70
End: 2020-01-07

## 2020-01-07 VITALS
DIASTOLIC BLOOD PRESSURE: 70 MMHG | BODY MASS INDEX: 23.51 KG/M2 | TEMPERATURE: 97.4 F | SYSTOLIC BLOOD PRESSURE: 142 MMHG | OXYGEN SATURATION: 98 % | HEIGHT: 77 IN | WEIGHT: 199.08 LBS | HEART RATE: 107 BPM

## 2020-01-07 DIAGNOSIS — D68.61 ANTI-PHOSPHOLIPID SYNDROME (HCC): ICD-10-CM

## 2020-01-07 LAB — INR PPP: 4.8 (ref 2–3)

## 2020-01-07 PROCEDURE — 36416 COLLJ CAPILLARY BLOOD SPEC: CPT | Performed by: NURSE PRACTITIONER

## 2020-01-07 PROCEDURE — 85610 PROTHROMBIN TIME: CPT | Performed by: NURSE PRACTITIONER

## 2020-01-07 PROCEDURE — 99214 OFFICE O/P EST MOD 30 MIN: CPT | Performed by: NURSE PRACTITIONER

## 2020-01-07 NOTE — ASSESSMENT & PLAN NOTE
INR today 4.8 after holding all coumadin dosing for 5 days.   Pt to hold dose today, resume every other day dosing at 2.5 mg starting tomorrow. Recheck INR on 1/15/20 and adjust dose as needed at that time.

## 2020-01-07 NOTE — PROGRESS NOTES
"Jose Hurtado / 69 y.o. / male  Encounter Date: 01/07/2020    ASSESSMENT & PLAN:    Problem List Items Addressed This Visit        Immune and Lymphatic    Anti-phospholipid syndrome (CMS/HCC) (Chronic)    Overview     DVT LLE in 2003 and 2006  Antiphospholipid syndrome  On life-long AC on warfarin.   GOAL INR : 2 -3          Current Assessment & Plan     INR today 4.8 after holding all coumadin dosing for 5 days.   Pt to hold dose today, resume every other day dosing at 2.5 mg starting tomorrow. Recheck INR on 1/15/20 and adjust dose as needed at that time.          Relevant Medications    warfarin (COUMADIN) 5 MG tablet    Other Relevant Orders    POC INR (Completed)        Orders Placed This Encounter   Procedures   • POC INR     No orders of the defined types were placed in this encounter.      Summary/Discussion:  As above.     Return in about 8 days (around 1/15/2020), or if symptoms worsen or fail to improve, for Recheck INR on MA schedule only, then follow scheduled appt after..  ________________________________________________________________    VITALS:    Visit Vitals  /70   Pulse 107   Temp 97.4 °F (36.3 °C) (Temporal)   Ht 195.6 cm (77.01\")   Wt 90.3 kg (199 lb 1.2 oz)   SpO2 98%   BMI 23.60 kg/m²       BP Readings from Last 3 Encounters:   01/07/20 142/70   01/03/20 118/60   12/27/19 118/62     Wt Readings from Last 3 Encounters:   01/07/20 90.3 kg (199 lb 1.2 oz)   01/03/20 90.3 kg (199 lb)   12/27/19 93 kg (205 lb)      Body mass index is 23.6 kg/m².    CC: Main reason(s) for today's visit: Coagulation Disorder      HPI    Patient is a 69 y.o. male who is here for 4 day follow up on INR check after last week's elevated INR of 8. He has been holding his coumadin completely since last check. Today his INR is 4.8.   He denies bleeding, changes in diet, supplements, or medications. He reports no alcohol at all since his last visit. He denies any other interval changes.     Patient Care Team:  Che, " Brandin Sales MD as PCP - General (Internal Medicine)  George Phelan II, MD as Consulting Physician (Hematology and Oncology)  Jose Inman MD as Consulting Physician (Urology)  Mulugeta Millan MD as Consulting Physician (Gastroenterology)  Seth Randhawa MD as Consulting Physician (Ophthalmology)  ____________________________________________________________________    REVIEW OF SYSTEMS    Review of Systems  As noted per HPI  Constitutional neg  Resp neg  CV neg     PHYSICAL EXAMINATION    Physical Exam   Constitutional: He is oriented to person, place, and time. He appears well-developed and well-nourished. No distress.   Cardiovascular: Normal rate.   Pulmonary/Chest: Effort normal.   Abdominal: Soft. He exhibits no distension and no mass. There is no tenderness.   Neurological: He is alert and oriented to person, place, and time.   Skin: Skin is warm.   Psychiatric: He has a normal mood and affect.   Vitals reviewed.    REVIEWED DATA:    Labs:   Lab Results   Component Value Date     04/19/2019    K 4.4 04/19/2019    AST 23 04/18/2019    ALT 19 04/18/2019    BUN 20 04/19/2019    CREATININE 0.95 04/19/2019    CREATININE 1.04 04/18/2019    CREATININE 1.12 04/18/2019    EGFRIFNONA 78 04/19/2019    EGFRIFAFRI 97 05/10/2018       Lab Results   Component Value Date    GLUCOSE 88 04/19/2019    GLUCOSE 103 (H) 04/18/2019    GLUCOSE 104 (H) 04/18/2019       Lab Results   Component Value Date    LDL 92 05/10/2018     (H) 01/30/2018    LDL 98 01/26/2017    HDL 45 05/10/2018    TRIG 79 05/10/2018       Lab Results   Component Value Date    TSH 2.050 06/06/2019    FREET4 1.18 06/06/2019          Lab Results   Component Value Date    WBC 2.89 (L) 12/13/2019    HGB 10.4 (L) 12/13/2019    HGB 9.9 (L) 10/15/2019    HGB 10.4 (L) 08/23/2019     12/13/2019         Imaging:      Medical Tests:      Summary of old records / correspondence / consultant report:      Request outside records:    ______________________________________________________________________    ALLERGIES  No Known Allergies     MEDICATIONS  Current Outpatient Medications on File Prior to Visit   Medication Sig   • ferrous sulfate 325 (65 FE) MG tablet TAKE ONE TABLET BY MOUTH DAILY WITH BREAKFAST (Patient taking differently: TAKE TWO TABLET BY MOUTH DAILY WITH BREAKFAST)   • PARoxetine (PAXIL) 30 MG tablet Take 1 tablet by mouth Every Morning.   • pramipexole (MIRAPEX) 1.5 MG tablet Take 1 tablet by mouth 2 (Two) Times a Day.   • tamsulosin (FLOMAX) 0.4 MG capsule 24 hr capsule Take 1 capsule by mouth Every Night.   • traZODone (DESYREL) 100 MG tablet Take 1 tablet by mouth Every Night for 180 days.   • warfarin (COUMADIN) 5 MG tablet Take 1 tablet by mouth Daily. (Patient taking differently: Take 5 mg by mouth Daily. 5 MG 5 DAYS A WEEK AND 2.5MG 2 DAYS A WEEK)     Current Facility-Administered Medications on File Prior to Visit   Medication   • cyanocobalamin injection 1,000 mcg       PFSH:     The following portions of the patient's history were reviewed and updated as appropriate: Allergies / Current Medications / Past Medical History / Surgical History / Social History / Family History    PROBLEM LIST   Patient Active Problem List   Diagnosis   • Adenocarcinoma of esophagus (CMS/HCC)   • Adenomatous polyp of colon   • Malignant neoplasm of prostate (CMS/HCC)   • RLS (restless legs syndrome)   • Depression   • Hypertension   • Anti-phospholipid syndrome (CMS/HCC)   • Anemia   • Insomnia due to other mental disorder   • Peripheral polyneuropathy   • B12 deficiency   • Postsurgical malabsorption       PAST MEDICAL HISTORY  Past Medical History:   Diagnosis Date   • Anemia    • Anti-phospholipid syndrome (CMS/HCC)     On lifelong anticoagulation therapy   • Bladder disorder     LEAKAGE   ON MED  wers pads   • Community acquired pneumonia     HISTORY OF IN 2014   • Deep venous thrombosis (CMS/HCC) 2006, 2008    Left lower extremity  multiple   • Depression    • Esophageal carcinoma (CMS/HCC) 12/31/2014    had chemo and radiation prior surgery   • Hemorrhoids    • HH (hiatus hernia)    • History of atrial fibrillation 2015    ONE EPISODE WHILE HOSPITALIZED   • History of kidney stones    • History of nephrolithiasis    • History of pancreatitis     PT STATES MANY YEARS AGO   • History of radiation therapy    • Hypertension    • Long-term (current) use of anticoagulants, INR goal 2.0-3.0    • Malignant neoplasm of prostate (CMS/HCC)    • Other hyperlipidemia 1/30/2018 January 30, 2018 lipid panel risk 12.8%   • Restless legs syndrome    • Squamous carcinoma     on the head       SURGICAL HISTORY  Past Surgical History:   Procedure Laterality Date   • APPENDECTOMY  1950   • BRONCHOSCOPY      (Diagnostic)   • CATARACT EXTRACTION Bilateral 2014   • COLONOSCOPY  12/15/2014    Complete / Description: EH, IH, torts, stool, follow-up colonoscopy due in 5 years.   • COLONOSCOPY N/A 6/13/2017    non-thrombosed external hemorrhoids, normal examined ileum, IH   • COLONOSCOPY N/A 2/26/2019    Procedure: COLONOSCOPY with Cold Polypectomy;  Surgeon: Mulugeta Millan MD;  Location: [facility] ENDOSCOPY;  Service: Gastroenterology   • CYSTOSCOPY W/ LASER LITHOTRIPSY     • ENDOSCOPY N/A 6/13/2017    Procedure: ESOPHAGOGASTRODUODENOSCOPY WITH COLD BIOPSY;  Surgeon: Lake Gonzalez MD;  Location: [facility] ENDOSCOPY;  Service:    • ESOPHAGECTOMY      April 2015, stage IIB esophageal carcinoma, sub-total resection.   • ESOPHAGECTOMY      Esophagectomy Subtotal [place] Procedure   • EXCISION LESION  08/2012    Removal of Squamous Cell CA on Head   • HAMMER TOE REPAIR  09/2014    Hammertoe Operation (Each Toe), 10/2014   • HAMMER TOE REPAIR Left 10/3/2017    Procedure: Left second third and fourth distal interphalangeal joint resection with flexor tenotomy;  Surgeon: Mulugeta Lira MD;  Location: [facility] OR [facility];  Service:    • HERNIA REPAIR       incisional   • JEJUNOSTOMY      Laparoscopic   • JEJUNOSTOMY      tube removal    • KNEE SURGERY Bilateral , ,    • PATELLA SURGERY Left     removed   • PILONIDAL CYST / SINUS EXCISION     • CT TOTAL KNEE ARTHROPLASTY Right 3/26/2018    Procedure: TOTAL KNEE ARTHROPLASTY;  Surgeon: Renny Soils MD;  Location: [facility];  Service: Orthopedics   • PROSTATECTOMY     • PYLOROPLASTY     • SPINAL FUSION  1998    C 5,6   • TONSILLECTOMY     • TONSILLECTOMY     • UPPER GASTROINTESTINAL ENDOSCOPY  12/15/2014    LA Grade D esophagitis, Pardo's, HH, multiple duodenal ulcers   • VENTRAL/INCISIONAL HERNIA REPAIR N/A 2016    Procedure: VENTRAL/INCISIONAL HERNIA REPAIR, open, with mesh, and component separation;  Surgeon: Darren Rivas MD;  Location: [facility];  Service:        SOCIAL HISTORY  Social History     Socioeconomic History   • Marital status:      Spouse name: Lena   • Number of children: 0   • Years of education: College   • Highest education level: Not on file   Occupational History   • Occupation: Retired      Comment: Retired    Tobacco Use   • Smoking status: Former Smoker     Packs/day: 2.00     Years: 3.00     Pack years: 6.00     Types: Cigarettes     Last attempt to quit:      Years since quittin.0   • Smokeless tobacco: Never Used   Substance and Sexual Activity   • Alcohol use: Yes     Frequency: 2-3 times a week     Comment: 2-3 x per week   • Drug use: No   • Sexual activity: Defer   Social History Narrative    self-employed        FAMILY HISTORY  Family History   Problem Relation Age of Onset   • Cerebral aneurysm Mother         cerebral artery aneurysm ( age 56)   • Prostate cancer Brother 68   • Anxiety disorder Father    • Suicide Attempts Father          of suicide   • Cancer Father         bladder   • No Known Problems Brother    • Colon cancer Neg Hx    • Esophageal cancer Neg Hx    •  Dementia Neg Hx

## 2020-01-14 ENCOUNTER — HOSPITAL ENCOUNTER (OUTPATIENT)
Facility: HOSPITAL | Age: 70
Setting detail: OBSERVATION
Discharge: HOME | End: 2020-01-16
Attending: EMERGENCY MEDICINE | Admitting: INTERNAL MEDICINE

## 2020-01-14 ENCOUNTER — APPOINTMENT (OUTPATIENT)
Dept: GENERAL RADIOLOGY | Facility: HOSPITAL | Age: 70
End: 2020-01-14

## 2020-01-14 ENCOUNTER — DOCUMENTATION (OUTPATIENT)
Dept: INTERNAL MEDICINE | Age: 70
End: 2020-01-14

## 2020-01-14 DIAGNOSIS — R09.02 HYPOXIA: ICD-10-CM

## 2020-01-14 DIAGNOSIS — R60.0 BILATERAL LEG EDEMA: ICD-10-CM

## 2020-01-14 DIAGNOSIS — J90 PLEURAL EFFUSION ON RIGHT: ICD-10-CM

## 2020-01-14 DIAGNOSIS — E53.8 B12 DEFICIENCY: ICD-10-CM

## 2020-01-14 DIAGNOSIS — R06.02 SHORTNESS OF BREATH: Primary | ICD-10-CM

## 2020-01-14 PROBLEM — I89.0 LYMPHEDEMA: Status: ACTIVE | Noted: 2020-01-14

## 2020-01-14 PROBLEM — Z79.01 LONG-TERM (CURRENT) USE OF ANTICOAGULANTS, INR GOAL 2.0-3.0: Status: ACTIVE | Noted: 2020-01-14

## 2020-01-14 LAB
ALBUMIN SERPL-MCNC: 3.4 G/DL (ref 3.5–5.2)
ALBUMIN/GLOB SERPL: 0.9 G/DL
ALP SERPL-CCNC: 106 U/L (ref 39–117)
ALT SERPL W P-5'-P-CCNC: 9 U/L (ref 1–41)
ANION GAP SERPL CALCULATED.3IONS-SCNC: 11.8 MMOL/L (ref 5–15)
AST SERPL-CCNC: 14 U/L (ref 1–40)
B PARAPERT DNA SPEC QL NAA+PROBE: NOT DETECTED
B PERT DNA SPEC QL NAA+PROBE: NOT DETECTED
BASOPHILS # BLD AUTO: 0.02 10*3/MM3 (ref 0–0.2)
BASOPHILS NFR BLD AUTO: 0.4 % (ref 0–1.5)
BILIRUB SERPL-MCNC: 0.4 MG/DL (ref 0.2–1.2)
BUN BLD-MCNC: 20 MG/DL (ref 8–23)
BUN/CREAT SERPL: 16.9 (ref 7–25)
C PNEUM DNA NPH QL NAA+NON-PROBE: NOT DETECTED
CALCIUM SPEC-SCNC: 8.6 MG/DL (ref 8.6–10.5)
CHLORIDE SERPL-SCNC: 101 MMOL/L (ref 98–107)
CO2 SERPL-SCNC: 25.2 MMOL/L (ref 22–29)
CREAT BLD-MCNC: 1.18 MG/DL (ref 0.76–1.27)
D-LACTATE SERPL-SCNC: 0.7 MMOL/L (ref 0.5–2)
DEPRECATED RDW RBC AUTO: 45.7 FL (ref 37–54)
EOSINOPHIL # BLD AUTO: 0.01 10*3/MM3 (ref 0–0.4)
EOSINOPHIL NFR BLD AUTO: 0.2 % (ref 0.3–6.2)
ERYTHROCYTE [DISTWIDTH] IN BLOOD BY AUTOMATED COUNT: 14.2 % (ref 12.3–15.4)
FLUAV H1 2009 PAND RNA NPH QL NAA+PROBE: NOT DETECTED
FLUAV H1 HA GENE NPH QL NAA+PROBE: NOT DETECTED
FLUAV H3 RNA NPH QL NAA+PROBE: NOT DETECTED
FLUAV SUBTYP SPEC NAA+PROBE: NOT DETECTED
FLUBV RNA ISLT QL NAA+PROBE: NOT DETECTED
GFR SERPL CREATININE-BSD FRML MDRD: 61 ML/MIN/1.73
GLOBULIN UR ELPH-MCNC: 3.8 GM/DL
GLUCOSE BLD-MCNC: 99 MG/DL (ref 65–99)
HADV DNA SPEC NAA+PROBE: NOT DETECTED
HCOV 229E RNA SPEC QL NAA+PROBE: NOT DETECTED
HCOV HKU1 RNA SPEC QL NAA+PROBE: NOT DETECTED
HCOV NL63 RNA SPEC QL NAA+PROBE: NOT DETECTED
HCOV OC43 RNA SPEC QL NAA+PROBE: NOT DETECTED
HCT VFR BLD AUTO: 30.9 % (ref 37.5–51)
HGB BLD-MCNC: 10.2 G/DL (ref 13–17.7)
HMPV RNA NPH QL NAA+NON-PROBE: NOT DETECTED
HOLD SPECIMEN: NORMAL
HOLD SPECIMEN: NORMAL
HPIV1 RNA SPEC QL NAA+PROBE: NOT DETECTED
HPIV2 RNA SPEC QL NAA+PROBE: NOT DETECTED
HPIV3 RNA NPH QL NAA+PROBE: NOT DETECTED
HPIV4 P GENE NPH QL NAA+PROBE: NOT DETECTED
IMM GRANULOCYTES # BLD AUTO: 0.02 10*3/MM3 (ref 0–0.05)
IMM GRANULOCYTES NFR BLD AUTO: 0.4 % (ref 0–0.5)
INR PPP: 1.76 (ref 0.9–1.1)
LYMPHOCYTES # BLD AUTO: 0.44 10*3/MM3 (ref 0.7–3.1)
LYMPHOCYTES NFR BLD AUTO: 7.8 % (ref 19.6–45.3)
M PNEUMO IGG SER IA-ACNC: NOT DETECTED
MCH RBC QN AUTO: 29.5 PG (ref 26.6–33)
MCHC RBC AUTO-ENTMCNC: 33 G/DL (ref 31.5–35.7)
MCV RBC AUTO: 89.3 FL (ref 79–97)
MONOCYTES # BLD AUTO: 0.25 10*3/MM3 (ref 0.1–0.9)
MONOCYTES NFR BLD AUTO: 4.4 % (ref 5–12)
NEUTROPHILS # BLD AUTO: 4.88 10*3/MM3 (ref 1.7–7)
NEUTROPHILS NFR BLD AUTO: 86.8 % (ref 42.7–76)
NRBC BLD AUTO-RTO: 0 /100 WBC (ref 0–0.2)
NT-PROBNP SERPL-MCNC: 551.4 PG/ML (ref 5–900)
PLATELET # BLD AUTO: 185 10*3/MM3 (ref 140–450)
PMV BLD AUTO: 8.7 FL (ref 6–12)
POTASSIUM BLD-SCNC: 4.4 MMOL/L (ref 3.5–5.2)
PROCALCITONIN SERPL-MCNC: 0.15 NG/ML (ref 0.1–0.25)
PROT SERPL-MCNC: 7.2 G/DL (ref 6–8.5)
PROTHROMBIN TIME: 20.2 SECONDS (ref 11.7–14.2)
RBC # BLD AUTO: 3.46 10*6/MM3 (ref 4.14–5.8)
RHINOVIRUS RNA SPEC NAA+PROBE: NOT DETECTED
RSV RNA NPH QL NAA+NON-PROBE: NOT DETECTED
SODIUM BLD-SCNC: 138 MMOL/L (ref 136–145)
TROPONIN T SERPL-MCNC: 0.03 NG/ML (ref 0–0.03)
TROPONIN T SERPL-MCNC: 0.04 NG/ML (ref 0–0.03)
WBC NRBC COR # BLD: 5.62 10*3/MM3 (ref 3.4–10.8)
WHOLE BLOOD HOLD SPECIMEN: NORMAL
WHOLE BLOOD HOLD SPECIMEN: NORMAL

## 2020-01-14 PROCEDURE — 84145 PROCALCITONIN (PCT): CPT | Performed by: EMERGENCY MEDICINE

## 2020-01-14 PROCEDURE — 87040 BLOOD CULTURE FOR BACTERIA: CPT | Performed by: EMERGENCY MEDICINE

## 2020-01-14 PROCEDURE — 0100U HC BIOFIRE FILMARRAY RESP PANEL 2: CPT | Performed by: EMERGENCY MEDICINE

## 2020-01-14 PROCEDURE — 83880 ASSAY OF NATRIURETIC PEPTIDE: CPT | Performed by: EMERGENCY MEDICINE

## 2020-01-14 PROCEDURE — 25010000002 FUROSEMIDE PER 20 MG: Performed by: EMERGENCY MEDICINE

## 2020-01-14 PROCEDURE — 94799 UNLISTED PULMONARY SVC/PX: CPT

## 2020-01-14 PROCEDURE — 96375 TX/PRO/DX INJ NEW DRUG ADDON: CPT

## 2020-01-14 PROCEDURE — 80053 COMPREHEN METABOLIC PANEL: CPT | Performed by: EMERGENCY MEDICINE

## 2020-01-14 PROCEDURE — 71046 X-RAY EXAM CHEST 2 VIEWS: CPT

## 2020-01-14 PROCEDURE — 96374 THER/PROPH/DIAG INJ IV PUSH: CPT

## 2020-01-14 PROCEDURE — 25010000002 METHYLPREDNISOLONE PER 125 MG: Performed by: EMERGENCY MEDICINE

## 2020-01-14 PROCEDURE — G0378 HOSPITAL OBSERVATION PER HR: HCPCS

## 2020-01-14 PROCEDURE — 93010 ELECTROCARDIOGRAM REPORT: CPT | Performed by: INTERNAL MEDICINE

## 2020-01-14 PROCEDURE — 85025 COMPLETE CBC W/AUTO DIFF WBC: CPT | Performed by: EMERGENCY MEDICINE

## 2020-01-14 PROCEDURE — 83605 ASSAY OF LACTIC ACID: CPT | Performed by: EMERGENCY MEDICINE

## 2020-01-14 PROCEDURE — 99285 EMERGENCY DEPT VISIT HI MDM: CPT

## 2020-01-14 PROCEDURE — 85610 PROTHROMBIN TIME: CPT | Performed by: INTERNAL MEDICINE

## 2020-01-14 PROCEDURE — 85610 PROTHROMBIN TIME: CPT | Performed by: EMERGENCY MEDICINE

## 2020-01-14 PROCEDURE — 84484 ASSAY OF TROPONIN QUANT: CPT | Performed by: EMERGENCY MEDICINE

## 2020-01-14 PROCEDURE — 94640 AIRWAY INHALATION TREATMENT: CPT

## 2020-01-14 PROCEDURE — 93005 ELECTROCARDIOGRAM TRACING: CPT | Performed by: EMERGENCY MEDICINE

## 2020-01-14 RX ORDER — ALBUTEROL SULFATE 2.5 MG/3ML
2.5 SOLUTION RESPIRATORY (INHALATION) ONCE
Status: COMPLETED | OUTPATIENT
Start: 2020-01-14 | End: 2020-01-14

## 2020-01-14 RX ORDER — TRAZODONE HYDROCHLORIDE 100 MG/1
100 TABLET ORAL NIGHTLY
Status: DISCONTINUED | OUTPATIENT
Start: 2020-01-15 | End: 2020-01-16 | Stop reason: HOSPADM

## 2020-01-14 RX ORDER — ONDANSETRON 2 MG/ML
4 INJECTION INTRAMUSCULAR; INTRAVENOUS EVERY 6 HOURS PRN
Status: DISCONTINUED | OUTPATIENT
Start: 2020-01-14 | End: 2020-01-16 | Stop reason: HOSPADM

## 2020-01-14 RX ORDER — ACETAMINOPHEN 160 MG/5ML
650 SOLUTION ORAL EVERY 4 HOURS PRN
Status: DISCONTINUED | OUTPATIENT
Start: 2020-01-14 | End: 2020-01-16 | Stop reason: HOSPADM

## 2020-01-14 RX ORDER — ALBUTEROL SULFATE 2.5 MG/3ML
2.5 SOLUTION RESPIRATORY (INHALATION) EVERY 6 HOURS PRN
Status: DISCONTINUED | OUTPATIENT
Start: 2020-01-14 | End: 2020-01-16 | Stop reason: HOSPADM

## 2020-01-14 RX ORDER — NITROGLYCERIN 0.4 MG/1
0.4 TABLET SUBLINGUAL
Status: DISCONTINUED | OUTPATIENT
Start: 2020-01-14 | End: 2020-01-16 | Stop reason: HOSPADM

## 2020-01-14 RX ORDER — ACETAMINOPHEN 650 MG/1
650 SUPPOSITORY RECTAL EVERY 4 HOURS PRN
Status: DISCONTINUED | OUTPATIENT
Start: 2020-01-14 | End: 2020-01-16 | Stop reason: HOSPADM

## 2020-01-14 RX ORDER — ACETAMINOPHEN 325 MG/1
650 TABLET ORAL EVERY 4 HOURS PRN
Status: DISCONTINUED | OUTPATIENT
Start: 2020-01-14 | End: 2020-01-16 | Stop reason: HOSPADM

## 2020-01-14 RX ORDER — FUROSEMIDE 10 MG/ML
20 INJECTION INTRAMUSCULAR; INTRAVENOUS ONCE
Status: COMPLETED | OUTPATIENT
Start: 2020-01-14 | End: 2020-01-14

## 2020-01-14 RX ORDER — TAMSULOSIN HYDROCHLORIDE 0.4 MG/1
0.4 CAPSULE ORAL NIGHTLY
Status: DISCONTINUED | OUTPATIENT
Start: 2020-01-15 | End: 2020-01-16 | Stop reason: HOSPADM

## 2020-01-14 RX ORDER — WARFARIN SODIUM 5 MG/1
5 TABLET ORAL ONCE
Status: COMPLETED | OUTPATIENT
Start: 2020-01-15 | End: 2020-01-15

## 2020-01-14 RX ORDER — SODIUM CHLORIDE 0.9 % (FLUSH) 0.9 %
10 SYRINGE (ML) INJECTION AS NEEDED
Status: DISCONTINUED | OUTPATIENT
Start: 2020-01-14 | End: 2020-01-16 | Stop reason: HOSPADM

## 2020-01-14 RX ORDER — METHYLPREDNISOLONE SODIUM SUCCINATE 125 MG/2ML
125 INJECTION, POWDER, LYOPHILIZED, FOR SOLUTION INTRAMUSCULAR; INTRAVENOUS ONCE
Status: COMPLETED | OUTPATIENT
Start: 2020-01-14 | End: 2020-01-14

## 2020-01-14 RX ORDER — IPRATROPIUM BROMIDE AND ALBUTEROL SULFATE 2.5; .5 MG/3ML; MG/3ML
3 SOLUTION RESPIRATORY (INHALATION) ONCE
Status: COMPLETED | OUTPATIENT
Start: 2020-01-14 | End: 2020-01-14

## 2020-01-14 RX ORDER — WARFARIN SODIUM 5 MG/1
5 TABLET ORAL DAILY
Status: DISCONTINUED | OUTPATIENT
Start: 2020-01-15 | End: 2020-01-14 | Stop reason: DRUGHIGH

## 2020-01-14 RX ORDER — FERROUS SULFATE 325(65) MG
325 TABLET ORAL
Status: DISCONTINUED | OUTPATIENT
Start: 2020-01-15 | End: 2020-01-16 | Stop reason: HOSPADM

## 2020-01-14 RX ORDER — CYANOCOBALAMIN 1000 UG/ML
1000 INJECTION, SOLUTION INTRAMUSCULAR; SUBCUTANEOUS
Status: DISCONTINUED | OUTPATIENT
Start: 2020-01-21 | End: 2020-01-16 | Stop reason: HOSPADM

## 2020-01-14 RX ORDER — FUROSEMIDE 10 MG/ML
20 INJECTION INTRAMUSCULAR; INTRAVENOUS
Status: DISCONTINUED | OUTPATIENT
Start: 2020-01-15 | End: 2020-01-16 | Stop reason: HOSPADM

## 2020-01-14 RX ORDER — PAROXETINE 30 MG/1
30 TABLET, FILM COATED ORAL DAILY
Status: DISCONTINUED | OUTPATIENT
Start: 2020-01-15 | End: 2020-01-16 | Stop reason: HOSPADM

## 2020-01-14 RX ORDER — SODIUM CHLORIDE 0.9 % (FLUSH) 0.9 %
10 SYRINGE (ML) INJECTION EVERY 12 HOURS SCHEDULED
Status: DISCONTINUED | OUTPATIENT
Start: 2020-01-15 | End: 2020-01-16 | Stop reason: HOSPADM

## 2020-01-14 RX ADMIN — ALBUTEROL SULFATE 2.5 MG: 2.5 SOLUTION RESPIRATORY (INHALATION) at 14:58

## 2020-01-14 RX ADMIN — SODIUM CHLORIDE, PRESERVATIVE FREE 10 ML: 5 INJECTION INTRAVENOUS at 15:03

## 2020-01-14 RX ADMIN — IPRATROPIUM BROMIDE AND ALBUTEROL SULFATE 3 ML: 2.5; .5 SOLUTION RESPIRATORY (INHALATION) at 14:58

## 2020-01-14 RX ADMIN — METHYLPREDNISOLONE SODIUM SUCCINATE 125 MG: 125 INJECTION, POWDER, FOR SOLUTION INTRAMUSCULAR; INTRAVENOUS at 17:17

## 2020-01-14 RX ADMIN — FUROSEMIDE 20 MG: 20 INJECTION, SOLUTION INTRAMUSCULAR; INTRAVENOUS at 17:33

## 2020-01-14 NOTE — ED NOTES
Patient to er with c/o shortness of breath for aprox a week. Today had increase in shortness of breath. Patient transported per ems from home.      Miki Davies RN  01/14/20 [id number]

## 2020-01-14 NOTE — ED NOTES
Pt reports SOB x 1 week, worsening with time. PCP recently took him off his lasix. Pt has bilat edema in lower extremities, states it it normal for him. History of DVT in the left calf.      Park Stewart, RN  01/14/20 [id number]

## 2020-01-14 NOTE — ED PROVIDER NOTES
" EMERGENCY DEPARTMENT ENCOUNTER    CHIEF COMPLAINT  Chief Complaint: SOA  History given by: self  History limited by: nothing  Room Number: 26/26  PMD: Brandin Bolton MD      HPI:  Pt is a 69 y.o. male who presents complaining of progressively worsening SOA that started one week ago. Pt also c/o productive cough with clear sputum and chronic BLE edema. Pt denies fever, chills and CP. The pt reports becoming mildly short of air approximately one week ago. Since onset, he has developed a productive cough with worsening sx's. He woke up today, \"gasping\" for air, so he came to the ED for further evaluation. While in route, EMS found the pt to be slightly hypoxic with Sat O2 of 90% on RA so he was placed on O2 4 L NC. Pt states he is normally not on supplemental oxygen. The pt has a hx of lymphedema for which he states he has constant BLE edema. Pt was taken off Lasix 6 months ago by Dr. Bolton (PMD) for unknown reasons. Pt is anticoagulated on Coumadin. Pt is UTD on influenza vaccination. Hx of DVT, HTN, lymphedema, AFIB malignany neoplasm of prostate, esophageal carcinoma and AFIB. PSHx of esophagectomy.     Duration:  One week  Onset: gradual  Timing: constant  Location: chest  Radiation: none stated  Quality: SOA  Intensity/Severity: moderate  Progression: worsening  Associated Symptoms: productive cough with clear sputum and chronic BLE edema  Aggravating Factors: none stated  Alleviating Factors: none stated  Previous Episodes: none stated  Treatment before arrival: none stated    PAST MEDICAL HISTORY  Active Ambulatory Problems     Diagnosis Date Noted   • Adenocarcinoma of esophagus (CMS/HCC) 02/11/2016   • Adenomatous polyp of colon 02/11/2016   • Malignant neoplasm of prostate (CMS/HCC) 02/11/2016   • RLS (restless legs syndrome) 02/11/2016   • Depression 01/26/2017   • Hypertension 04/12/2018   • Anti-phospholipid syndrome (CMS/HCC) 05/10/2018   • Anemia 04/18/2019   • Insomnia due to other mental disorder " 06/06/2019   • Peripheral polyneuropathy 06/06/2019   • B12 deficiency 06/07/2019   • Postsurgical malabsorption 10/25/2019     Resolved Ambulatory Problems     Diagnosis Date Noted   • Dysphagia 02/11/2016   • Duodenal ulcer 02/11/2016   • Decreased body weight 02/11/2016   • Cough    • Hyperkalemia 02/15/2016   • History of deep vein thrombosis 03/22/2016   • DVT (deep venous thrombosis) (CMS/HCC) 08/24/2017   • Other hyperlipidemia 01/30/2018     Past Medical History:   Diagnosis Date   • Bladder disorder    • Community acquired pneumonia    • Deep venous thrombosis (CMS/HCC) 2006, 2008   • Esophageal carcinoma (CMS/HCC) 12/31/2014   • Hemorrhoids    • HH (hiatus hernia)    • History of atrial fibrillation 2015   • History of kidney stones    • History of nephrolithiasis    • History of pancreatitis    • History of radiation therapy    • Long-term (current) use of anticoagulants, INR goal 2.0-3.0    • Restless legs syndrome    • Squamous carcinoma        PAST SURGICAL HISTORY  Past Surgical History:   Procedure Laterality Date   • APPENDECTOMY  1950   • BRONCHOSCOPY      (Diagnostic)   • CATARACT EXTRACTION Bilateral 2014   • COLONOSCOPY  12/15/2014    Complete / Description: EH, IH, torts, stool, follow-up colonoscopy due in 5 years.   • COLONOSCOPY N/A 6/13/2017    non-thrombosed external hemorrhoids, normal examined ileum, IH   • COLONOSCOPY N/A 2/26/2019    Procedure: COLONOSCOPY with Cold Polypectomy;  Surgeon: Mulugeta Millan MD;  Location: [facility] ENDOSCOPY;  Service: Gastroenterology   • CYSTOSCOPY W/ LASER LITHOTRIPSY     • ENDOSCOPY N/A 6/13/2017    Procedure: ESOPHAGOGASTRODUODENOSCOPY WITH COLD BIOPSY;  Surgeon: Lake Gonzalez MD;  Location: [facility] ENDOSCOPY;  Service:    • ESOPHAGECTOMY      April 2015, stage IIB esophageal carcinoma, sub-total resection.   • ESOPHAGECTOMY      Esophagectomy Subtotal [place] Procedure   • EXCISION LESION  08/2012    Removal of Squamous Cell CA on Head   •  HAMMER TOE REPAIR  2014    Hammertoe Operation (Each Toe), 10/2014   • HAMMER TOE REPAIR Left 10/3/2017    Procedure: Left second third and fourth distal interphalangeal joint resection with flexor tenotomy;  Surgeon: Mulugeta Lira MD;  Location: [facility];  Service:    • HERNIA REPAIR      incisional   • JEJUNOSTOMY      Laparoscopic   • JEJUNOSTOMY      tube removal    • KNEE SURGERY Bilateral , ,    • PATELLA SURGERY Left     removed   • PILONIDAL CYST / SINUS EXCISION     • RI TOTAL KNEE ARTHROPLASTY Right 3/26/2018    Procedure: TOTAL KNEE ARTHROPLASTY;  Surgeon: Renny Solis MD;  Location: [facility] OR;  Service: Orthopedics   • PROSTATECTOMY     • PYLOROPLASTY     • SPINAL FUSION  1998    C 5,6   • TONSILLECTOMY     • TONSILLECTOMY     • UPPER GASTROINTESTINAL ENDOSCOPY  12/15/2014    LA Grade D esophagitis, Pardo's, HH, multiple duodenal ulcers   • VENTRAL/INCISIONAL HERNIA REPAIR N/A 2016    Procedure: VENTRAL/INCISIONAL HERNIA REPAIR, open, with mesh, and component separation;  Surgeon: Darren Rivas MD;  Location: [facility];  Service:        FAMILY HISTORY  Family History   Problem Relation Age of Onset   • Cerebral aneurysm Mother         cerebral artery aneurysm ( age 56)   • Prostate cancer Brother 68   • Anxiety disorder Father    • Suicide Attempts Father          of suicide   • Cancer Father         bladder   • No Known Problems Brother    • Colon cancer Neg Hx    • Esophageal cancer Neg Hx    • Dementia Neg Hx        SOCIAL HISTORY  Social History     Socioeconomic History   • Marital status:      Spouse name: Lena   • Number of children: 0   • Years of education: College   • Highest education level: Not on file   Occupational History   • Occupation: Retired      Comment: Retired    Tobacco Use   • Smoking status: Former Smoker     Packs/day: 2.00     Years: 3.00     Pack years: 6.00      Types: Cigarettes     Last attempt to quit: 1974     Years since quittin.0   • Smokeless tobacco: Never Used   Substance and Sexual Activity   • Alcohol use: Yes     Frequency: 2-3 times a week     Comment: 2-3 x per week   • Drug use: No   • Sexual activity: Defer   Social History Narrative    self-employed        ALLERGIES  Patient has no known allergies.    REVIEW OF SYSTEMS  Review of Systems   Constitutional: Negative for activity change, appetite change and fever.   HENT: Negative for congestion and sore throat.    Respiratory: Positive for cough (productive with clear sputum) and shortness of breath.    Cardiovascular: Positive for leg swelling (chronic BLE). Negative for chest pain.   Gastrointestinal: Negative for abdominal pain, diarrhea and vomiting.   Genitourinary: Negative for decreased urine volume and dysuria.   Musculoskeletal: Negative for neck pain.   Skin: Negative for rash and wound.   Neurological: Negative for weakness, numbness and headaches.   All other systems reviewed and are negative.      PHYSICAL EXAM  ED Triage Vitals [20 1427]   Temp Heart Rate Resp BP SpO2   99.3 °F (37.4 °C) 93 -- 170/72 97 %      Temp src Heart Rate Source Patient Position BP Location FiO2 (%)   Oral -- -- -- --       Physical Exam   Constitutional: He is oriented to person, place, and time. No distress.   HENT:   Head: Normocephalic and atraumatic.   Eyes: Pupils are equal, round, and reactive to light. EOM are normal.   Neck: Normal range of motion. Neck supple.   Cardiovascular: Normal rate, regular rhythm and normal heart sounds.   Pulmonary/Chest: Effort normal. No respiratory distress. He has decreased breath sounds in the left lower field. He has rales.   Abdominal: Soft. There is no tenderness. There is no rebound and no guarding.   Musculoskeletal: Normal range of motion.        Right lower leg: He exhibits edema (4+).        Left lower leg: He exhibits edema (4+).   Neurological: He is  alert and oriented to person, place, and time. He has normal sensation and normal strength.   Skin: Skin is warm and dry.   Psychiatric: Mood and affect normal.   Nursing note and vitals reviewed.      LAB RESULTS  Lab Results (last 24 hours)     Procedure Component Value Units Date/Time    CBC & Differential [[id number]] Collected:  01/14/20 1500    Specimen:  Blood Updated:  01/14/20 1517    Narrative:       The following orders were created for panel order CBC & Differential.  Procedure                               Abnormality         Status                     ---------                               -----------         ------                     CBC Auto Differential[[id number]]        Abnormal            Final result                 Please view results for these tests on the individual orders.    Comprehensive Metabolic Panel [[id number]]  (Abnormal) Collected:  01/14/20 1500    Specimen:  Blood Updated:  01/14/20 1552     Glucose 99 mg/dL      BUN 20 mg/dL      Creatinine 1.18 mg/dL      Sodium 138 mmol/L      Potassium 4.4 mmol/L      Chloride 101 mmol/L      CO2 25.2 mmol/L      Calcium 8.6 mg/dL      Total Protein 7.2 g/dL      Albumin 3.40 g/dL      ALT (SGPT) 9 U/L      AST (SGOT) 14 U/L      Alkaline Phosphatase 106 U/L      Total Bilirubin 0.4 mg/dL      eGFR Non African Amer 61 mL/min/1.73      Globulin 3.8 gm/dL      A/G Ratio 0.9 g/dL      BUN/Creatinine Ratio 16.9     Anion Gap 11.8 mmol/L     Narrative:       GFR Normal >60  Chronic Kidney Disease <60  Kidney Failure <15      BNP [[id number]]  (Normal) Collected:  01/14/20 1500    Specimen:  Blood Updated:  01/14/20 1548     proBNP 551.4 pg/mL     Narrative:       Among patients with dyspnea, NT-proBNP is highly sensitive for the detection of acute congestive heart failure. In addition NT-proBNP of <300 pg/ml effectively rules out acute congestive heart failure with 99% negative predictive value.    Results may be falsely decreased if patient taking  "Biotin.      Troponin [[id number]]  (Abnormal) Collected:  01/14/20 1500    Specimen:  Blood Updated:  01/14/20 1553     Troponin T 0.033 ng/mL     Narrative:       Troponin T Reference Range:  <= 0.03 ng/mL-   Negative for AMI  >0.03 ng/mL-     Abnormal for myocardial necrosis.  Clinicians would have to utilize clinical acumen, EKG, Troponin and serial changes to determine if it is an Acute Myocardial Infarction or myocardial injury due to an underlying chronic condition.       Results may be falsely decreased if patient taking Biotin.      Procalcitonin [[id number]]  (Normal) Collected:  01/14/20 1500    Specimen:  Blood Updated:  01/14/20 1555     Procalcitonin 0.15 ng/mL     Narrative:       As a Marker for Sepsis (Non-Neonates):   1. <0.5 ng/mL represents a low risk of severe sepsis and/or septic shock.  1. >2 ng/mL represents a high risk of severe sepsis and/or septic shock.    As a Marker for Lower Respiratory Tract Infections that require antibiotic therapy:  PCT on Admission     Antibiotic Therapy             6-12 Hrs later  > 0.5                Strongly Recommended            >0.25 - <0.5         Recommended  0.1 - 0.25           Discouraged                   Remeasure/reassess PCT  <0.1                 Strongly Discouraged          Remeasure/reassess PCT      As 28 day mortality risk marker: \"Change in Procalcitonin Result\" (> 80 % or <=80 %) if Day 0 (or Day 1) and Day 4 values are available. Refer to http://www.[facility]-pct-calculator.com/   Change in PCT <=80 %   A decrease of PCT levels below or equal to 80 % defines a positive change in PCT test result representing a higher risk for 28-day all-cause mortality of patients diagnosed with severe sepsis or septic shock.  Change in PCT > 80 %   A decrease of PCT levels of more than 80 % defines a negative change in PCT result representing a lower risk for 28-day all-cause mortality of patients diagnosed with severe sepsis or septic " shock.                Results may be falsely decreased if patient taking Biotin.     Lactic Acid, Plasma [[id number]]  (Normal) Collected:  01/14/20 1500    Specimen:  Blood Updated:  01/14/20 1532     Lactate 0.7 mmol/L     Blood Culture - Blood, Arm, Right [[id number]] Collected:  01/14/20 1500    Specimen:  Blood from Arm, Right Updated:  01/14/20 1511    Blood Culture - Blood, Arm, Right [[id number]] Collected:  01/14/20 1500    Specimen:  Blood from Arm, Right Updated:  01/14/20 1511    Protime-INR [[id number]]  (Abnormal) Collected:  01/14/20 1500    Specimen:  Blood Updated:  01/14/20 1532     Protime 20.2 Seconds      INR 1.76    CBC Auto Differential [[id number]]  (Abnormal) Collected:  01/14/20 1500    Specimen:  Blood Updated:  01/14/20 1517     WBC 5.62 10*3/mm3      RBC 3.46 10*6/mm3      Hemoglobin 10.2 g/dL      Hematocrit 30.9 %      MCV 89.3 fL      MCH 29.5 pg      MCHC 33.0 g/dL      RDW 14.2 %      RDW-SD 45.7 fl      MPV 8.7 fL      Platelets 185 10*3/mm3      Neutrophil % 86.8 %      Lymphocyte % 7.8 %      Monocyte % 4.4 %      Eosinophil % 0.2 %      Basophil % 0.4 %      Immature Grans % 0.4 %      Neutrophils, Absolute 4.88 10*3/mm3      Lymphocytes, Absolute 0.44 10*3/mm3      Monocytes, Absolute 0.25 10*3/mm3      Eosinophils, Absolute 0.01 10*3/mm3      Basophils, Absolute 0.02 10*3/mm3      Immature Grans, Absolute 0.02 10*3/mm3      nRBC 0.0 /100 WBC     Respiratory Panel, PCR - Swab, Nasopharynx [[id number]]  (Normal) Collected:  01/14/20 1506    Specimen:  Swab from Nasopharynx Updated:  01/14/20 1612     ADENOVIRUS, PCR Not Detected     Coronavirus 229E Not Detected     Coronavirus HKU1 Not Detected     Coronavirus NL63 Not Detected     Coronavirus OC43 Not Detected     Human Metapneumovirus Not Detected     Human Rhinovirus/Enterovirus Not Detected     Influenza B PCR Not Detected     Parainfluenza Virus 1 Not Detected     Parainfluenza Virus 2 Not Detected     Parainfluenza Virus 3 Not  Detected     Parainfluenza Virus 4 Not Detected     Bordetella pertussis pcr Not Detected     Influenza A H1 2009 PCR Not Detected     Chlamydophila pneumoniae PCR Not Detected     Mycoplasma pneumo by PCR Not Detected     Influenza A PCR Not Detected     Influenza A H3 Not Detected     Influenza A H1 Not Detected     RSV, PCR Not Detected     Bordetella parapertussis PCR Not Detected          I ordered the above labs and reviewed the results    RADIOLOGY  XR Chest 2 View   Final Result   Mild to moderate right pleural effusion and right basilar   atelectasis or infiltrate, follow-up recommended. Tortuous aorta.       This report was finalized on 1/14/2020 3:54 PM by Dr. Sumanth Boyer M.D.               I ordered the above noted radiological studies. Interpreted by radiologist. Reviewed by me in PACS.       PROCEDURES  EKG          EKG time: 1452  Rhythm/Rate: SR, 90  P waves and AL: normal  QRS, axis: normal   ST and T waves: normal     Interpreted Contemporaneously by me, independently viewed  Previously seen PVC's have resolved when compared to prior 4/19/19      PROGRESS AND CONSULTS     1441 CXR ordered. Proventil ordered. Duo-neb ordered. INR ordered. CMP ordered. BNP ordered. Troponin ordered. Respiratory panel ordered. Procalcitonin ordered. Lactic acid ordered. Blood culture ordered.    1701 Troponin ordered.    1702 Patient is resting comfortably and in NAD. Patient is stable. BP- 156/96 HR- 90 Temp- 99.3 °F (37.4 °C) (Oral) O2 sat- 100%. Upon re-evaluation of pt, his air movement has improved. Informed the patient results of CXR show suspected PNA. Discussed the plan for admission. Pt understands and agrees with the plan, all questions answered.    1704 Solu-medrol ordered. Call made to [facility]    1715 Troponin ordered.    1717 Discussed pt w/Dr. Osborne ([facility]) who agrees to admit pt to telemetry.    MEDICAL DECISION MAKING  Results were reviewed/discussed with the patient and they were also made aware of  online access. Pt also made aware that some labs, such as cultures, will not be resulted during ER visit and follow up with PMD is necessary.     MDM  Number of Diagnoses or Management Options     Amount and/or Complexity of Data Reviewed  Clinical lab tests: ordered and reviewed (Troponin 0.033  INR 1.76  WBC 5.62)  Tests in the radiology section of CPT®: ordered and reviewed (CXR)  Tests in the medicine section of CPT®: ordered and reviewed (See EKG procedure notes)  Decide to obtain previous medical records or to obtain history from someone other than the patient: yes  Discuss the patient with other providers: yes (Dr. Osborne ([facility]))  Independent visualization of images, tracings, or specimens: yes           DIAGNOSIS  Final diagnoses:   Shortness of breath   Hypoxia   Pleural effusion on right   Bilateral leg edema       DISPOSITION  ADMISSION    Discussed treatment plan and reason for admission with pt/family and admitting physician.  Pt/family voiced understanding of the plan for admission for further testing/treatment as needed.       Latest Documented Vital Signs:  As of 5:23 PM  BP- 156/69 HR- 92 Temp- 99.3 °F (37.4 °C) (Oral) O2 sat- 98%    --  Documentation assistance provided by eunice Strong for Dr. Felix Nelson.  Information recorded by the scribe was done at my direction and has been verified and validated by me.     Tae Ortega  01/14/20 3948       Felix Nelson MD  01/14/20 7915

## 2020-01-15 ENCOUNTER — APPOINTMENT (OUTPATIENT)
Dept: CARDIOLOGY | Facility: HOSPITAL | Age: 70
End: 2020-01-15

## 2020-01-15 ENCOUNTER — APPOINTMENT (OUTPATIENT)
Dept: CT IMAGING | Facility: HOSPITAL | Age: 70
End: 2020-01-15

## 2020-01-15 LAB
ALBUMIN SERPL-MCNC: 2.9 G/DL (ref 3.5–5.2)
ALBUMIN/GLOB SERPL: 0.8 G/DL
ALP SERPL-CCNC: 93 U/L (ref 39–117)
ALT SERPL W P-5'-P-CCNC: 11 U/L (ref 1–41)
ANION GAP SERPL CALCULATED.3IONS-SCNC: 15.6 MMOL/L (ref 5–15)
AORTIC DIMENSIONLESS INDEX: 0.9 (DI)
ARTERIAL PATENCY WRIST A: POSITIVE
AST SERPL-CCNC: 11 U/L (ref 1–40)
ATMOSPHERIC PRESS: 754.8 MMHG
BASE EXCESS BLDA CALC-SCNC: 1.1 MMOL/L (ref 0–2)
BASOPHILS # BLD AUTO: 0 10*3/MM3 (ref 0–0.2)
BASOPHILS NFR BLD AUTO: 0 % (ref 0–1.5)
BDY SITE: ABNORMAL
BH CV ECHO MEAS - ACS: 2.4 CM
BH CV ECHO MEAS - AO MAX PG: 7 MMHG
BH CV ECHO MEAS - AO MEAN PG (FULL): 1 MMHG
BH CV ECHO MEAS - AO MEAN PG: 4 MMHG
BH CV ECHO MEAS - AO ROOT AREA (BSA CORRECTED): 1.5
BH CV ECHO MEAS - AO ROOT AREA: 8.3 CM^2
BH CV ECHO MEAS - AO ROOT DIAM: 3.3 CM
BH CV ECHO MEAS - AO V2 MAX: 134 CM/SEC
BH CV ECHO MEAS - AO V2 MEAN: 89 CM/SEC
BH CV ECHO MEAS - AO V2 VTI: 32.2 CM
BH CV ECHO MEAS - AVA(I,A): 4.1 CM^2
BH CV ECHO MEAS - AVA(I,D): 4.1 CM^2
BH CV ECHO MEAS - BSA(HAYCOCK): 2.2 M^2
BH CV ECHO MEAS - BSA: 2.2 M^2
BH CV ECHO MEAS - BZI_BMI: 26.4 KILOGRAMS/M^2
BH CV ECHO MEAS - BZI_METRIC_HEIGHT: 188 CM
BH CV ECHO MEAS - BZI_METRIC_WEIGHT: 93.4 KG
BH CV ECHO MEAS - EDV(CUBED): 64 ML
BH CV ECHO MEAS - EDV(MOD-SP2): 123 ML
BH CV ECHO MEAS - EDV(MOD-SP4): 135 ML
BH CV ECHO MEAS - EDV(TEICH): 70 ML
BH CV ECHO MEAS - EF(CUBED): 75.6 %
BH CV ECHO MEAS - EF(MOD-BP): 72 %
BH CV ECHO MEAS - EF(MOD-SP2): 74.8 %
BH CV ECHO MEAS - EF(MOD-SP4): 70.4 %
BH CV ECHO MEAS - EF(TEICH): 68.1 %
BH CV ECHO MEAS - ESV(CUBED): 15.6 ML
BH CV ECHO MEAS - ESV(MOD-SP2): 31 ML
BH CV ECHO MEAS - ESV(MOD-SP4): 40 ML
BH CV ECHO MEAS - ESV(TEICH): 22.3 ML
BH CV ECHO MEAS - FS: 37.5 %
BH CV ECHO MEAS - IVS/LVPW: 1
BH CV ECHO MEAS - IVSD: 1.2 CM
BH CV ECHO MEAS - LA DIMENSION: 4.3 CM
BH CV ECHO MEAS - LA/AO: 1.3
BH CV ECHO MEAS - LAT PEAK E' VEL: 11.5 CM/SEC
BH CV ECHO MEAS - LV DIASTOLIC VOL/BSA (35-75): 61.3 ML/M^2
BH CV ECHO MEAS - LV MASS(C)D: 165.5 GRAMS
BH CV ECHO MEAS - LV MASS(C)DI: 75.2 GRAMS/M^2
BH CV ECHO MEAS - LV MEAN PG: 3 MMHG
BH CV ECHO MEAS - LV SYSTOLIC VOL/BSA (12-30): 18.2 ML/M^2
BH CV ECHO MEAS - LV V1 MAX: 119 CM/SEC
BH CV ECHO MEAS - LV V1 MEAN: 73.7 CM/SEC
BH CV ECHO MEAS - LV V1 VTI: 29.2 CM
BH CV ECHO MEAS - LVIDD: 4 CM
BH CV ECHO MEAS - LVIDS: 2.5 CM
BH CV ECHO MEAS - LVLD AP2: 8.8 CM
BH CV ECHO MEAS - LVLD AP4: 9.3 CM
BH CV ECHO MEAS - LVLS AP2: 7 CM
BH CV ECHO MEAS - LVLS AP4: 7.9 CM
BH CV ECHO MEAS - LVOT AREA (M): 4.5 CM^2
BH CV ECHO MEAS - LVOT AREA: 4.5 CM^2
BH CV ECHO MEAS - LVOT DIAM: 2.4 CM
BH CV ECHO MEAS - LVPWD: 1.2 CM
BH CV ECHO MEAS - MED PEAK E' VEL: 8.1 CM/SEC
BH CV ECHO MEAS - MV A DUR: 0.2 SEC
BH CV ECHO MEAS - MV A MAX VEL: 114 CM/SEC
BH CV ECHO MEAS - MV DEC SLOPE: 224 CM/SEC^2
BH CV ECHO MEAS - MV DEC TIME: 0.26 SEC
BH CV ECHO MEAS - MV E MAX VEL: 112 CM/SEC
BH CV ECHO MEAS - MV E/A: 0.98
BH CV ECHO MEAS - MV MEAN PG: 3 MMHG
BH CV ECHO MEAS - MV P1/2T MAX VEL: 113 CM/SEC
BH CV ECHO MEAS - MV P1/2T: 147.8 MSEC
BH CV ECHO MEAS - MV V2 MEAN: 85.4 CM/SEC
BH CV ECHO MEAS - MV V2 VTI: 43.7 CM
BH CV ECHO MEAS - MVA P1/2T LCG: 1.9 CM^2
BH CV ECHO MEAS - MVA(P1/2T): 1.5 CM^2
BH CV ECHO MEAS - MVA(VTI): 3 CM^2
BH CV ECHO MEAS - PA ACC SLOPE: 797 CM/SEC^2
BH CV ECHO MEAS - PA ACC TIME: 0.11 SEC
BH CV ECHO MEAS - PA MAX PG: 2.8 MMHG
BH CV ECHO MEAS - PA PR(ACCEL): 31.3 MMHG
BH CV ECHO MEAS - PA V2 MAX: 84.2 CM/SEC
BH CV ECHO MEAS - PULM A REVS DUR: 0.15 SEC
BH CV ECHO MEAS - PULM A REVS VEL: 30.1 CM/SEC
BH CV ECHO MEAS - PULM DIAS VEL: 32.4 CM/SEC
BH CV ECHO MEAS - PULM S/D: 1.5
BH CV ECHO MEAS - PULM SYS VEL: 48.6 CM/SEC
BH CV ECHO MEAS - QP/QS: 0.29
BH CV ECHO MEAS - RAP SYSTOLE: 3 MMHG
BH CV ECHO MEAS - RV MEAN PG: 1 MMHG
BH CV ECHO MEAS - RV V1 MEAN: 36.5 CM/SEC
BH CV ECHO MEAS - RV V1 VTI: 11.2 CM
BH CV ECHO MEAS - RVOT AREA: 3.5 CM^2
BH CV ECHO MEAS - RVOT DIAM: 2.1 CM
BH CV ECHO MEAS - RVSP: 27.4 MMHG
BH CV ECHO MEAS - SI(AO): 121.4 ML/M^2
BH CV ECHO MEAS - SI(CUBED): 22 ML/M^2
BH CV ECHO MEAS - SI(LVOT): 60 ML/M^2
BH CV ECHO MEAS - SI(MOD-SP2): 41.8 ML/M^2
BH CV ECHO MEAS - SI(MOD-SP4): 43.2 ML/M^2
BH CV ECHO MEAS - SI(TEICH): 21.7 ML/M^2
BH CV ECHO MEAS - SV(AO): 267.1 ML
BH CV ECHO MEAS - SV(CUBED): 48.4 ML
BH CV ECHO MEAS - SV(LVOT): 132.1 ML
BH CV ECHO MEAS - SV(MOD-SP2): 92 ML
BH CV ECHO MEAS - SV(MOD-SP4): 95 ML
BH CV ECHO MEAS - SV(RVOT): 38.8 ML
BH CV ECHO MEAS - SV(TEICH): 47.7 ML
BH CV ECHO MEAS - TAPSE (>1.6): 3.2 CM2
BH CV ECHO MEAS - TR MAX VEL: 247 CM/SEC
BH CV ECHO MEASUREMENTS AVERAGE E/E' RATIO: 11.43
BH CV XLRA - RV BASE: 3.8 CM
BH CV XLRA - RV LENGTH: 7.9 CM
BH CV XLRA - RV MID: 2.6 CM
BH CV XLRA - TDI S': 17.4 CM/SEC
BILIRUB SERPL-MCNC: 0.2 MG/DL (ref 0.2–1.2)
BUN BLD-MCNC: 23 MG/DL (ref 8–23)
BUN/CREAT SERPL: 20.9 (ref 7–25)
CALCIUM SPEC-SCNC: 8.5 MG/DL (ref 8.6–10.5)
CHLORIDE SERPL-SCNC: 100 MMOL/L (ref 98–107)
CO2 SERPL-SCNC: 23.4 MMOL/L (ref 22–29)
CREAT BLD-MCNC: 1.1 MG/DL (ref 0.76–1.27)
DEPRECATED RDW RBC AUTO: 45.1 FL (ref 37–54)
EOSINOPHIL # BLD AUTO: 0 10*3/MM3 (ref 0–0.4)
EOSINOPHIL NFR BLD AUTO: 0 % (ref 0.3–6.2)
ERYTHROCYTE [DISTWIDTH] IN BLOOD BY AUTOMATED COUNT: 13.9 % (ref 12.3–15.4)
GAS FLOW AIRWAY: 3 LPM
GFR SERPL CREATININE-BSD FRML MDRD: 66 ML/MIN/1.73
GLOBULIN UR ELPH-MCNC: 3.8 GM/DL
GLUCOSE BLD-MCNC: 252 MG/DL (ref 65–99)
GLUCOSE BLDC GLUCOMTR-MCNC: 139 MG/DL (ref 70–130)
GLUCOSE BLDC GLUCOMTR-MCNC: 184 MG/DL (ref 70–130)
GLUCOSE BLDC GLUCOMTR-MCNC: 197 MG/DL (ref 70–130)
HCO3 BLDA-SCNC: 25.5 MMOL/L (ref 22–28)
HCT VFR BLD AUTO: 27.5 % (ref 37.5–51)
HGB BLD-MCNC: 8.8 G/DL (ref 13–17.7)
IMM GRANULOCYTES # BLD AUTO: 0.02 10*3/MM3 (ref 0–0.05)
IMM GRANULOCYTES NFR BLD AUTO: 0.4 % (ref 0–0.5)
INR PPP: 1.72 (ref 0.9–1.1)
INR PPP: 1.92 (ref 0.9–1.1)
LEFT ATRIUM VOLUME INDEX: 23 ML/M2
LV EF 2D ECHO EST: 72 %
LYMPHOCYTES # BLD AUTO: 0.52 10*3/MM3 (ref 0.7–3.1)
LYMPHOCYTES NFR BLD AUTO: 9.3 % (ref 19.6–45.3)
MAXIMAL PREDICTED HEART RATE: 149 BPM
MCH RBC QN AUTO: 28.4 PG (ref 26.6–33)
MCHC RBC AUTO-ENTMCNC: 32 G/DL (ref 31.5–35.7)
MCV RBC AUTO: 88.7 FL (ref 79–97)
MODALITY: ABNORMAL
MONOCYTES # BLD AUTO: 0.05 10*3/MM3 (ref 0.1–0.9)
MONOCYTES NFR BLD AUTO: 0.9 % (ref 5–12)
NEUTROPHILS # BLD AUTO: 4.98 10*3/MM3 (ref 1.7–7)
NEUTROPHILS NFR BLD AUTO: 89.4 % (ref 42.7–76)
NRBC BLD AUTO-RTO: 0 /100 WBC (ref 0–0.2)
PCO2 BLDA: 38.8 MM HG (ref 35–45)
PH BLDA: 7.43 PH UNITS (ref 7.35–7.45)
PLATELET # BLD AUTO: 172 10*3/MM3 (ref 140–450)
PMV BLD AUTO: 8.9 FL (ref 6–12)
PO2 BLDA: 121.9 MM HG (ref 80–100)
POTASSIUM BLD-SCNC: 4 MMOL/L (ref 3.5–5.2)
PROT SERPL-MCNC: 6.7 G/DL (ref 6–8.5)
PROTHROMBIN TIME: 19.9 SECONDS (ref 11.7–14.2)
PROTHROMBIN TIME: 21.6 SECONDS (ref 11.7–14.2)
RBC # BLD AUTO: 3.1 10*6/MM3 (ref 4.14–5.8)
SAO2 % BLDCOA: 98.8 % (ref 92–99)
SODIUM BLD-SCNC: 139 MMOL/L (ref 136–145)
STRESS TARGET HR: 127 BPM
TOTAL RATE: 20 BREATHS/MINUTE
TROPONIN T SERPL-MCNC: 0.04 NG/ML (ref 0–0.03)
WBC NRBC COR # BLD: 5.57 10*3/MM3 (ref 3.4–10.8)

## 2020-01-15 PROCEDURE — 97162 PT EVAL MOD COMPLEX 30 MIN: CPT

## 2020-01-15 PROCEDURE — G0378 HOSPITAL OBSERVATION PER HR: HCPCS

## 2020-01-15 PROCEDURE — 96372 THER/PROPH/DIAG INJ SC/IM: CPT

## 2020-01-15 PROCEDURE — 93306 TTE W/DOPPLER COMPLETE: CPT

## 2020-01-15 PROCEDURE — 80053 COMPREHEN METABOLIC PANEL: CPT | Performed by: INTERNAL MEDICINE

## 2020-01-15 PROCEDURE — 96376 TX/PRO/DX INJ SAME DRUG ADON: CPT

## 2020-01-15 PROCEDURE — 82962 GLUCOSE BLOOD TEST: CPT

## 2020-01-15 PROCEDURE — 25010000002 PERFLUTREN (DEFINITY) 8.476 MG IN SODIUM CHLORIDE (PF) 0.9 % 10 ML INJECTION: Performed by: INTERNAL MEDICINE

## 2020-01-15 PROCEDURE — 82803 BLOOD GASES ANY COMBINATION: CPT

## 2020-01-15 PROCEDURE — 97535 SELF CARE MNGMENT TRAINING: CPT | Performed by: OCCUPATIONAL THERAPIST

## 2020-01-15 PROCEDURE — 94799 UNLISTED PULMONARY SVC/PX: CPT

## 2020-01-15 PROCEDURE — 63710000001 INSULIN LISPRO (HUMAN) PER 5 UNITS: Performed by: INTERNAL MEDICINE

## 2020-01-15 PROCEDURE — 25010000002 ENOXAPARIN PER 10 MG: Performed by: INTERNAL MEDICINE

## 2020-01-15 PROCEDURE — 93306 TTE W/DOPPLER COMPLETE: CPT | Performed by: INTERNAL MEDICINE

## 2020-01-15 PROCEDURE — 99214 OFFICE O/P EST MOD 30 MIN: CPT | Performed by: NURSE PRACTITIONER

## 2020-01-15 PROCEDURE — 25010000002 FUROSEMIDE PER 20 MG: Performed by: INTERNAL MEDICINE

## 2020-01-15 PROCEDURE — 36600 WITHDRAWAL OF ARTERIAL BLOOD: CPT

## 2020-01-15 PROCEDURE — 0 IOPAMIDOL PER 1 ML: Performed by: INTERNAL MEDICINE

## 2020-01-15 PROCEDURE — 85610 PROTHROMBIN TIME: CPT | Performed by: INTERNAL MEDICINE

## 2020-01-15 PROCEDURE — 71275 CT ANGIOGRAPHY CHEST: CPT

## 2020-01-15 PROCEDURE — 97165 OT EVAL LOW COMPLEX 30 MIN: CPT | Performed by: OCCUPATIONAL THERAPIST

## 2020-01-15 PROCEDURE — 97110 THERAPEUTIC EXERCISES: CPT

## 2020-01-15 PROCEDURE — 85025 COMPLETE CBC W/AUTO DIFF WBC: CPT | Performed by: INTERNAL MEDICINE

## 2020-01-15 PROCEDURE — 84484 ASSAY OF TROPONIN QUANT: CPT | Performed by: NURSE PRACTITIONER

## 2020-01-15 RX ORDER — IPRATROPIUM BROMIDE AND ALBUTEROL SULFATE 2.5; .5 MG/3ML; MG/3ML
3 SOLUTION RESPIRATORY (INHALATION)
Status: DISCONTINUED | OUTPATIENT
Start: 2020-01-15 | End: 2020-01-16 | Stop reason: HOSPADM

## 2020-01-15 RX ORDER — NICOTINE POLACRILEX 4 MG
15 LOZENGE BUCCAL
Status: DISCONTINUED | OUTPATIENT
Start: 2020-01-15 | End: 2020-01-16 | Stop reason: HOSPADM

## 2020-01-15 RX ORDER — DEXTROSE MONOHYDRATE 25 G/50ML
25 INJECTION, SOLUTION INTRAVENOUS
Status: DISCONTINUED | OUTPATIENT
Start: 2020-01-15 | End: 2020-01-16 | Stop reason: HOSPADM

## 2020-01-15 RX ORDER — WARFARIN SODIUM 5 MG/1
5 TABLET ORAL ONCE
Status: COMPLETED | OUTPATIENT
Start: 2020-01-15 | End: 2020-01-15

## 2020-01-15 RX ADMIN — WARFARIN SODIUM 5 MG: 5 TABLET ORAL at 01:32

## 2020-01-15 RX ADMIN — FERROUS SULFATE TAB 325 MG (65 MG ELEMENTAL FE) 325 MG: 325 (65 FE) TAB at 09:17

## 2020-01-15 RX ADMIN — ENOXAPARIN SODIUM 90 MG: 100 INJECTION SUBCUTANEOUS at 23:53

## 2020-01-15 RX ADMIN — SODIUM CHLORIDE, PRESERVATIVE FREE 10 ML: 5 INJECTION INTRAVENOUS at 09:23

## 2020-01-15 RX ADMIN — TRAZODONE HYDROCHLORIDE 100 MG: 100 TABLET ORAL at 01:29

## 2020-01-15 RX ADMIN — ENOXAPARIN SODIUM 90 MG: 100 INJECTION SUBCUTANEOUS at 11:58

## 2020-01-15 RX ADMIN — SODIUM CHLORIDE, PRESERVATIVE FREE 10 ML: 5 INJECTION INTRAVENOUS at 21:04

## 2020-01-15 RX ADMIN — PERFLUTREN 2 ML: 6.52 INJECTION, SUSPENSION INTRAVENOUS at 09:15

## 2020-01-15 RX ADMIN — FUROSEMIDE 20 MG: 20 INJECTION, SOLUTION INTRAMUSCULAR; INTRAVENOUS at 17:39

## 2020-01-15 RX ADMIN — PRAMIPEXOLE DIHYDROCHLORIDE 0.75 MG: 0.5 TABLET ORAL at 01:29

## 2020-01-15 RX ADMIN — WARFARIN SODIUM 5 MG: 5 TABLET ORAL at 18:44

## 2020-01-15 RX ADMIN — FUROSEMIDE 20 MG: 20 INJECTION, SOLUTION INTRAMUSCULAR; INTRAVENOUS at 09:18

## 2020-01-15 RX ADMIN — TAMSULOSIN HYDROCHLORIDE 0.4 MG: 0.4 CAPSULE ORAL at 21:03

## 2020-01-15 RX ADMIN — PRAMIPEXOLE DIHYDROCHLORIDE 0.75 MG: 0.5 TABLET ORAL at 21:04

## 2020-01-15 RX ADMIN — INSULIN LISPRO 2 UNITS: 100 INJECTION, SOLUTION INTRAVENOUS; SUBCUTANEOUS at 21:04

## 2020-01-15 RX ADMIN — PRAMIPEXOLE DIHYDROCHLORIDE 0.75 MG: 0.5 TABLET ORAL at 09:18

## 2020-01-15 RX ADMIN — SODIUM CHLORIDE, PRESERVATIVE FREE 10 ML: 5 INJECTION INTRAVENOUS at 01:29

## 2020-01-15 RX ADMIN — IPRATROPIUM BROMIDE AND ALBUTEROL SULFATE 3 ML: 2.5; .5 SOLUTION RESPIRATORY (INHALATION) at 16:12

## 2020-01-15 RX ADMIN — PAROXETINE HYDROCHLORIDE 30 MG: 30 TABLET, FILM COATED ORAL at 09:18

## 2020-01-15 RX ADMIN — IPRATROPIUM BROMIDE AND ALBUTEROL SULFATE 3 ML: 2.5; .5 SOLUTION RESPIRATORY (INHALATION) at 11:41

## 2020-01-15 RX ADMIN — ENOXAPARIN SODIUM 90 MG: 100 INJECTION SUBCUTANEOUS at 12:30

## 2020-01-15 RX ADMIN — INSULIN LISPRO 2 UNITS: 100 INJECTION, SOLUTION INTRAVENOUS; SUBCUTANEOUS at 11:58

## 2020-01-15 RX ADMIN — IPRATROPIUM BROMIDE AND ALBUTEROL SULFATE 3 ML: 2.5; .5 SOLUTION RESPIRATORY (INHALATION) at 20:14

## 2020-01-15 RX ADMIN — TRAZODONE HYDROCHLORIDE 100 MG: 100 TABLET ORAL at 21:03

## 2020-01-15 RX ADMIN — TAMSULOSIN HYDROCHLORIDE 0.4 MG: 0.4 CAPSULE ORAL at 01:29

## 2020-01-15 RX ADMIN — SODIUM CHLORIDE, PRESERVATIVE FREE 10 ML: 5 INJECTION INTRAVENOUS at 23:54

## 2020-01-15 RX ADMIN — IOPAMIDOL 85 ML: 755 INJECTION, SOLUTION INTRAVENOUS at 14:00

## 2020-01-15 RX ADMIN — ENOXAPARIN SODIUM 90 MG: 100 INJECTION SUBCUTANEOUS at 01:29

## 2020-01-15 NOTE — PROGRESS NOTES
Discharge Planning Assessment  [facility]     Patient Name: Jose Hurtado  MRN: [id number]  Today's Date: 1/15/2020    Admit Date: 1/14/2020    Discharge Needs Assessment     Row Name 01/15/20 5239       Living Environment    Lives With  spouse    Name(s) of Who Lives With Patient  Lena Alvarado [phone]    Current Living Arrangements  home/apartment/condo    Primary Care Provided by  self    Provides Primary Care For  no one    Family Caregiver if Needed  spouse    Family Caregiver Names  Lena Alvarado (wife)    Quality of Family Relationships  helpful;involved;supportive    Able to Return to Prior Arrangements  yes       Resource/Environmental Concerns    Resource/Environmental Concerns  none    Transportation Concerns  car, none       Transition Planning    Patient/Family Anticipates Transition to  home with family    Patient/Family Anticipated Services at Transition  none    Transportation Anticipated  family or friend will provide       Discharge Needs Assessment    Readmission Within the Last 30 Days  no previous admission in last 30 days    Concerns to be Addressed  discharge planning    Equipment Currently Used at Home  bipap/cpap;cane, quad    Anticipated Changes Related to Illness  none    Equipment Needed After Discharge  none        Discharge Plan     Row Name 01/15/20 0043       Plan    Plan  Home with family    Patient/Family in Agreement with Plan  yes    Plan Comments  Facesheet verified. IMM given.  Explained my role as CCP. Plan is home with family.  He lives with his wife in a house with no stairs.  He does have a cane and bipap at home.  Does not look like he will need rehab or HH but if he does he would like to do [facility].  CCP will continue to follow closely for needs.                  Demographic Summary     Row Name 01/15/20 1336       General Information    Admission Type  inpatient    Arrived From  home    Required Notices Provided  Important Message from Medicare    Reason for  Consult  discharge planning    Preferred Language  English     Used During This Interaction  no       Contact Information    Permission Granted to Share Info With  family/designee    Contact Information Comments  Lena Alvarado (spouse) [phone]        Functional Status     Row Name 01/15/20 1337       Functional Status    Usual Activity Tolerance  good    Current Activity Tolerance  moderate       Functional Status, IADL    Medications  independent    Meal Preparation  independent    Housekeeping  independent    Laundry  independent    Shopping  independent       Mental Status    General Appearance WDL  WDL       Mental Status Summary    Recent Changes in Mental Status/Cognitive Functioning  no changes        Psychosocial     Row Name 01/15/20 1338       Behavior WDL    Behavior WDL  WDL       Emotion Mood WDL    Emotion/Mood/Affect WDL  WDL       Speech WDL    Speech WDL  WDL       Perceptual State WDL    Perceptual State WDL  WDL       Thought Process WDL    Thought Process WDL  WDL       Intellectual Performance WDL    Intellectual Performance WDL  WDL       Coping/Stress    Patient Personal Strengths  able to adapt    Sources of Support  adult child(mallory);spouse    Reaction to Health Status  accepting;adjusting    Understanding of Condition and Treatment  adequate understanding of medical condition       Developmental Stage (Eriksson's)    Developmental Stage  Stage 8 (63 years-death/Late Adulthood) Integrity vs. Despair        Abuse/Neglect     Row Name 01/15/20 1339       Personal Safety    Feels Unsafe at Home or Work/School  no    Feels Threatened by Someone  no    Does Anyone Try to Keep You From Having Contact with Others or Doing Things Outside Your Home?  no    Physical Signs of Abuse Present  no            Shannon Epley, RN

## 2020-01-15 NOTE — PROGRESS NOTES
[facility]               ASSOCIATES     LOS: 0 days     Name: Jose Hurtado  Age: 69 y.o.  Sex: male  :  1950  MRN: [id number]         Primary Care Physician: Brandin Bolton MD    Diet Regular; Cardiac    Subjective   Patient is seen by bedside, he feels better today.    Objective   Temp:  [97.4 °F (36.3 °C)-99.3 °F (37.4 °C)] 97.6 °F (36.4 °C)  Heart Rate:  [66-95] 66  Resp:  [17-20] 18  BP: (122-170)/(58-96) 127/80  SpO2:  [95 %-100 %] 97 %  on  Flow (L/min):  [1-4] 1;   Device (Oxygen Therapy): nasal cannula  Body mass index is 26.45 kg/m².    Physical Exam   General Appearance:    Alert, cooperative, no distress, appears stated age   Head:    Normocephalic, without obvious abnormality, atraumatic   Eyes:    PERRL, conjunctiva/corneas clear, EOM's intact, both eyes   Ears:    Normal external ear canals, both ears   Nose:   Nares normal, septum midline, mucosa normal, no drainage    or sinus tenderness   Throat:   Lips, tongue, gums normal; oral mucosa pink and moist   Neck:   Supple, symmetrical, trachea midline, no adenopathy;     thyroid:  no enlargement/tenderness/nodules; no carotid    bruit or JVD   Back:     Symmetric, no curvature, ROM normal, no CVA tenderness   Lungs:    Decreased breath sounds at the bases   Chest Wall:    No tenderness or deformity    Heart:    Regular rate and rhythm, S1 and S2 normal, no murmur, rub   or gallop   Abdomen:     Soft, non-tender, bowel sounds active all four quadrants,    Extremities:  Chronic bilateral lower extremity edema   Pulses:   Pulses palpable in all extremities; symmetric all extremities   Skin:   Skin color normal, Skin is warm and dry,  no rashes or palpable lesions   Neurologic:  Grossly nonfocal     Reviewed medications and new clinical results        Results from last 7 days   Lab Units 01/15/20  0442 20  1500   WBC 10*3/mm3 5.57 5.62   HEMOGLOBIN g/dL 8.8* 10.2*   PLATELETS 10*3/mm3 172 185     Results from  last 7 days   Lab Units 01/15/20  0442 01/14/20  1500   SODIUM mmol/L 139 138   POTASSIUM mmol/L 4.0 4.4   CHLORIDE mmol/L 100 101   CO2 mmol/L 23.4 25.2   BUN mg/dL 23 20   CREATININE mg/dL 1.10 1.18   CALCIUM mg/dL 8.5* 8.6   GLUCOSE mg/dL 252* 99     Lab Results   Component Value Date    ANIONGAP 15.6 (H) 01/15/2020     No results found for: POCGLU  No results found for: HGBA1C  Estimated Creatinine Clearance: 81.4 mL/min (by C-G formula based on SCr of 1.1 mg/dL).    Assessment/Plan   Active Hospital Problems    Diagnosis  POA   • **Shortness of breath [R06.02]  Yes   • Long-term (current) use of anticoagulants, INR goal 2.0-3.0 [Z79.01]  Not Applicable   • Lymphedema [I89.0]  Unknown   • Anti-phospholipid syndrome (CMS/HCC) [D68.61]  Yes   • Hypertension [I10]  Yes   • Malignant neoplasm of prostate (CMS/HCC) [C61]  Yes   • Adenocarcinoma of esophagus (CMS/HCC) [C15.9]  Yes      Resolved Hospital Problems   No resolved problems to display.     69 y.o. male     Assessment and plan:  1.  Acute respiratory failure with hypoxia.  Patient did complain of significant shortness of breath.  Echo has been done this morning, we will review the results.  Continue IV Lasix.  Pulmonary and cardiology consultation has been requested.  Coumadin was resumed, patient is currently on full dose Lovenox until INR is in therapeutic range.  2.  Anemia, drop in hemoglobin noted.  We will continue to follow hemoglobin trend.  3.  History of malignant neoplasm of prostate and adenocarcinoma of esophagus.  Chronic condition.  4.  Hypertension.  BP currently in acceptable range.  Continue to monitor.  5.  Depression.  Continue Paxil therapy.  6.  Diabetes mellitus.  I will initiate Accu-Cheks and sliding scale insulin coverage.  7.  Further plans based on hospital course.    Bill Glalo MD   01/15/20  10:49 AM

## 2020-01-15 NOTE — PLAN OF CARE
Cont to ditatye. Monitor labs.   Problem: Patient Care Overview  Goal: Plan of Care Review  Outcome: Ongoing (interventions implemented as appropriate)  Goal: Individualization and Mutuality  Outcome: Ongoing (interventions implemented as appropriate)  Goal: Discharge Needs Assessment  Outcome: Ongoing (interventions implemented as appropriate)  Goal: Interprofessional Rounds/Family Conf  Outcome: Ongoing (interventions implemented as appropriate)     Problem: Fall Risk (Adult)  Goal: Identify Related Risk Factors and Signs and Symptoms  Outcome: Ongoing (interventions implemented as appropriate)  Goal: Absence of Fall  Outcome: Ongoing (interventions implemented as appropriate)     Problem: Pain, Chronic (Adult)  Goal: Identify Related Risk Factors and Signs and Symptoms  Outcome: Ongoing (interventions implemented as appropriate)  Goal: Acceptable Pain/Comfort Level and Functional Ability  Outcome: Ongoing (interventions implemented as appropriate)     Problem: Cardiac: Heart Failure (Adult)  Goal: Signs and Symptoms of Listed Potential Problems Will be Absent, Minimized or Managed (Cardiac: Heart Failure)  Outcome: Ongoing (interventions implemented as appropriate)

## 2020-01-15 NOTE — PROGRESS NOTES
Pharmacy Consult: Warfarin Dosing/ Monitoring    Jose Hurtado is a 69 y.o. male, estimated creatinine clearance is 81.4 mL/min (by C-G formula based on SCr of 1.1 mg/dL). weighing 93.4 kg (206 lb).     has a past medical history of Anemia, Anti-phospholipid syndrome (CMS/HCC), Bladder disorder, Community acquired pneumonia, Deep venous thrombosis (CMS/HCC) (, ), Depression, Esophageal carcinoma (CMS/HCC) (2014), Hemorrhoids, HH (hiatus hernia), History of atrial fibrillation (), History of kidney stones, History of nephrolithiasis, History of pancreatitis, History of radiation therapy, Hypertension, Long-term (current) use of anticoagulants, INR goal 2.0-3.0, Malignant neoplasm of prostate (CMS/HCC), Other hyperlipidemia (2018), Restless legs syndrome, and Squamous carcinoma.    Social History     Tobacco Use    Smoking status: Former Smoker     Packs/day: 2.00     Years: 3.00     Pack years: 6.00     Types: Cigarettes     Last attempt to quit: 1974     Years since quittin.0    Smokeless tobacco: Never Used   Substance Use Topics    Alcohol use: Yes     Frequency: 2-3 times a week     Comment: 2-3 x per week    Drug use: No       Results from last 7 days   Lab Units 01/15/20  0442 20  2336 20  1500   INR  1.92* 1.72* 1.76*   HEMOGLOBIN g/dL 8.8*  --  10.2*   HEMATOCRIT % 27.5*  --  30.9*   PLATELETS 10*3/mm3 172  --  185     Results from last 7 days   Lab Units 01/15/20  0442 20  1500   SODIUM mmol/L 139 138   POTASSIUM mmol/L 4.0 4.4   CHLORIDE mmol/L 100 101   CO2 mmol/L 23.4 25.2   BUN mg/dL 23 20   CREATININE mg/dL 1.10 1.18   CALCIUM mg/dL 8.5* 8.6   BILIRUBIN mg/dL 0.2 0.4   ALK PHOS U/L 93 106   ALT (SGPT) U/L 11 9   AST (SGOT) U/L 11 14   GLUCOSE mg/dL 252* 99     Anticoagulation history: On 20 patient was instructed to take 2.5mg qod as INR was 4.8 at the time. Did show a history of 2.5mg on WSa, 5mg [facility] Anticoagulation:  Consulting  provider: Dr. Osborne  Start date: prior to admission but was on hold  Indication: DVT/PE active thrombosis  Target INR: 2-3  Expected duration: indefinite  Bridge Therapy: yes (90mg q12h) d/c once INR is therapeutic                Date 1/3/20 1/7/204 1/14/20 1/15/20         INR 8 4.8 1.76 1.92         Warfarin dose held held  5mg @  0132           Potential drug interactions:Concurrent use of PAROXETINE and ANTICOAGULANTS may result in an increased risk of bleeding. Concurrent use of TRAZODONE and ANTIPLATELETS, ANTICOAGULANTS, OR NSAIDS may result in increased risk of bleeding    Relevant nutrition status: regular cardiac diet    Education complete?/ Date: Pending    Assessment/Plan:  Dose:5mg given this am at 0132..will give another 5mg tonight at 1800 and this should put INR into desired target range tomorrow.    Monitor INR daily  Follow up tomorrow    Pharmacy will continue to follow until discharge or discontinuation of warfarin.   George Loza, ABDIAS  1/15/2020

## 2020-01-15 NOTE — CONSULTS
Referring Provider: Dr. Osborne  Reason for Consultation: Shortness of breath    Patient Care Team:  Brandin Bolton MD as PCP - General (Internal Medicine)  Tapan, George THORPE II, MD as Consulting Physician (Hematology and Oncology)  Jose Inman MD as Consulting Physician (Urology)  Mulugeta Millan MD as Consulting Physician (Gastroenterology)  Seth Randhawa MD as Consulting Physician (Ophthalmology)    Chief complaint:   Shortness of breath    History of present illness:  Subjective   This is a 69-year-old male patient, former minimal smoker (2 years) with no prior history of lung disease.      He had esophagectomy in 2015 for esophageal cancer.  He also has a history of DVT in the left leg and takes Coumadin.    He presented to the hospital yesterday for shortness of breath which started about a week ago.  Dyspnea occur on exertion and is relieved by rest.  At baseline, has shortness of breath on strenuous activity but recently has difficulty breathing was occurring on minimal activities such as walking across the room.  Dyspnea is relieved by rest.  There is mild associated cough with minimal phlegm but this is chronic and attributed to regurgitation since his esophagectomy in 2015.  No wheezing.    He stated that his not much physically active during the winter.  He can usually mow his yard and work in his yard in the summer but has been mostly sedentary in the winter and there are days when he stays in bed or on the couch the whole day.    Echo 1/15/2020:  EF 70%; RVSP 27; bicarb 23; hemoglobin 8.8; WBC normalized    Review of Systems  Constitutional: No fever or chills.  No change in appetite  ENMT: No sinus congestion or postnasal drip  Cardiovascular: No chest pain, palpitation but legs swelling, L greater than R attributed to prior DVT.  Respiratory: No dyspnea, cough or wheezing.  Gastrointestinal: No constipation, diarrhea or abdominal pain   Neurology: No headache, weakness,  numbness or dizziness.   Musculoskeletal: No joints pain, stiffness or swelling.   Psychiatry: No depression.  Genitourinary: No dysuria or frequent urination  Endo: No weight changes. No cold or warm intolerance.  Lymphatic: No swollen glands.  Integumentary: No rash.    History  Past Medical History:   Diagnosis Date   • Anemia    • Anti-phospholipid syndrome (CMS/HCC)     On lifelong anticoagulation therapy   • Bladder disorder     LEAKAGE   ON MED  wers pads   • Community acquired pneumonia     HISTORY OF IN 2014   • Deep venous thrombosis (CMS/HCC) 2006, 2008    Left lower extremity multiple   • Depression    • Esophageal carcinoma (CMS/HCC) 12/31/2014    had chemo and radiation prior surgery   • Hemorrhoids    • HH (hiatus hernia)    • History of atrial fibrillation 2015    ONE EPISODE WHILE HOSPITALIZED   • History of kidney stones    • History of nephrolithiasis    • History of pancreatitis     PT STATES MANY YEARS AGO   • History of radiation therapy    • Hypertension    • Long-term (current) use of anticoagulants, INR goal 2.0-3.0    • Malignant neoplasm of prostate (CMS/HCC)    • Other hyperlipidemia 1/30/2018 January 30, 2018 lipid panel risk 12.8%   • Restless legs syndrome    • Squamous carcinoma     on the head   ,   Past Surgical History:   Procedure Laterality Date   • APPENDECTOMY  1950   • BRONCHOSCOPY      (Diagnostic)   • CATARACT EXTRACTION Bilateral 2014   • COLONOSCOPY  12/15/2014    Complete / Description: EH, IH, torts, stool, follow-up colonoscopy due in 5 years.   • COLONOSCOPY N/A 6/13/2017    non-thrombosed external hemorrhoids, normal examined ileum, IH   • COLONOSCOPY N/A 2/26/2019    Procedure: COLONOSCOPY with Cold Polypectomy;  Surgeon: Mulugeta Millan MD;  Location: [facility];  Service: Gastroenterology   • CYSTOSCOPY W/ LASER LITHOTRIPSY     • ENDOSCOPY N/A 6/13/2017    Procedure: ESOPHAGOGASTRODUODENOSCOPY WITH COLD BIOPSY;  Surgeon: Lake Gonzalez MD;   Location: [facility] ENDOSCOPY;  Service:    • ESOPHAGECTOMY      2015, stage IIB esophageal carcinoma, sub-total resection.   • ESOPHAGECTOMY      Esophagectomy Subtotal Andrea Joe Procedure   • EXCISION LESION  2012    Removal of Squamous Cell CA on Head   • HAMMER TOE REPAIR  2014    Hammertoe Operation (Each Toe), 10/2014   • HAMMER TOE REPAIR Left 10/3/2017    Procedure: Left second third and fourth distal interphalangeal joint resection with flexor tenotomy;  Surgeon: Mulugeta Lira MD;  Location: [facility];  Service:    • HERNIA REPAIR      incisional   • JEJUNOSTOMY      Laparoscopic   • JEJUNOSTOMY      tube removal    • KNEE SURGERY Bilateral , ,    • PATELLA SURGERY Left     removed   • PILONIDAL CYST / SINUS EXCISION     • MD TOTAL KNEE ARTHROPLASTY Right 3/26/2018    Procedure: TOTAL KNEE ARTHROPLASTY;  Surgeon: Renny Solis MD;  Location: [facility] OR;  Service: Orthopedics   • PROSTATECTOMY     • PYLOROPLASTY     • SPINAL FUSION  1998    C 5,6   • TONSILLECTOMY     • TONSILLECTOMY     • UPPER GASTROINTESTINAL ENDOSCOPY  12/15/2014    LA Grade D esophagitis, Pardo's, HH, multiple duodenal ulcers   • VENTRAL/INCISIONAL HERNIA REPAIR N/A 2016    Procedure: VENTRAL/INCISIONAL HERNIA REPAIR, open, with mesh, and component separation;  Surgeon: Darren Rivas MD;  Location: [facility] OR;  Service:    ,   Family History   Problem Relation Age of Onset   • Cerebral aneurysm Mother         cerebral artery aneurysm ( age 56)   • Prostate cancer Brother 68   • Anxiety disorder Father    • Suicide Attempts Father          of suicide   • Cancer Father         bladder   • No Known Problems Brother    • Colon cancer Neg Hx    • Esophageal cancer Neg Hx    • Dementia Neg Hx    ,   Social History     Tobacco Use   • Smoking status: Former Smoker     Packs/day: 2.00     Years: 3.00     Pack years: 6.00     Types: Cigarettes     Last attempt to  quit: 1974     Years since quittin.0   • Smokeless tobacco: Never Used   Substance Use Topics   • Alcohol use: Yes     Frequency: 2-3 times a week     Comment: 2-3 x per week   • Drug use: No   ,   Facility-Administered Medications Prior to Admission   Medication Dose Route Frequency Provider Last Rate Last Dose   • cyanocobalamin injection 1,000 mcg  1,000 mcg Intramuscular Q28 Days Brandin Bolton MD   1,000 mcg at 19 1128     Medications Prior to Admission   Medication Sig Dispense Refill Last Dose   • warfarin (COUMADIN) 5 MG tablet Take 1 tablet by mouth Daily. (Patient taking differently: Take 5 mg by mouth Daily. 5 MG 5 DAYS A WEEK AND 2.5MG 2 DAYS A WEEK) 90 tablet 2 Patient Taking Differently at Unknown time   • ferrous sulfate 325 (65 FE) MG tablet TAKE ONE TABLET BY MOUTH DAILY WITH BREAKFAST (Patient taking differently: TAKE TWO TABLET BY MOUTH DAILY WITH BREAKFAST) 90 tablet 0 Taking   • PARoxetine (PAXIL) 30 MG tablet Take 1 tablet by mouth Every Morning. 90 tablet 1 Taking   • pramipexole (MIRAPEX) 1.5 MG tablet Take 1 tablet by mouth 2 (Two) Times a Day. 180 tablet 1 Taking   • tamsulosin (FLOMAX) 0.4 MG capsule 24 hr capsule Take 1 capsule by mouth Every Night.   Taking   • traZODone (DESYREL) 100 MG tablet Take 1 tablet by mouth Every Night for 180 days. 30 tablet 5 Taking   , Scheduled Meds:    [START ON 2020] cyanocobalamin 1,000 mcg Intramuscular Q28 Days   enoxaparin 1 mg/kg Subcutaneous Q12H   ferrous sulfate 325 mg Oral Daily With Breakfast   furosemide 20 mg Intravenous BID   insulin lispro 0-9 Units Subcutaneous 4x Daily With Meals & Nightly   ipratropium-albuterol 3 mL Nebulization 4x Daily - RT   PARoxetine 30 mg Oral Daily   pramipexole 0.75 mg Oral BID   sodium chloride 10 mL Intravenous Q12H   tamsulosin 0.4 mg Oral Nightly   traZODone 100 mg Oral Nightly   warfarin 5 mg Oral Once   , Continuous Infusions:    Pharmacy to dose warfarin     and Allergies:  Patient has no  known allergies.    Objective     Vital Signs   Temp:  [97.4 °F (36.3 °C)-97.6 °F (36.4 °C)] 97.5 °F (36.4 °C)  Heart Rate:  [61-92] 67  Resp:  [17-18] 18  BP: (121-156)/(58-84) 121/58    Physical Exam:  Constitutional: Not in acute distress.  Eyes: Injected conjunctivae, EOMI. pupils equal reactive to light.  ENMT: Delgado 3. No oral thrush. Tonsils grade 1  Neck: Trachea midline. No thyromegaly  Heart: RRR, no murmur  Lungs: minimal crackles in the left lower lobe.  Equal breath sounds bilaterally but slightly diminished on the right.  No wheezing.  Nonlabored breathing  Abdomen: Soft. No tenderness or dullness. No HSM.  Extremities: No cyanosis, clubbing but pitting edema, L >R.  Warm extremities and well-perfused.  Neuro: Conscious, alert, oriented x3.  Strength 5/5 in arms.  Psych: Appropriate mood and affect.    Integumentary: No rash.  Normal skin turgor  Lymphatic: No palpable cervical or supraclavicular lymph nodes.      Diagnostic imaging:  I personally and independently reviewed the following images:     CT chest 1/15/20:  Right pleural effusion. atelectasis in the right lower lobe with gastric pull-up.  Nodular infiltrates in the left lower lobe.  The infiltrates in the left lower lobe are new compared to CT and January 2019 which I reviewed.        Assessment   1. Exertional shortness of breath: Secondary to some restriction (atelectasis and pleural effusion) and anemia.  2. Right lower lobe atelectasis, secondary to gastric herniation into the chest.    3. Mild right pleural effusion, likely reactive/negative pressure pleural effusion from nearby atelectasis  4. Minimal left lower lobe pulmonary infiltrates, suspect aspiration.  Doubt pneumonia in the setting of lack of fever or leukocytosis and normal procalcitonin.  5. History of DVT, on anticoagulation with Coumadin  6. Subtherapeutic INR    Recommendations:    · Incentive spirometry  · Ambulation  · No need to drain the pleural effusion as it is  not significantly compromising his respiratory status.  In addition it is chronic and was present in April and likely related to negative pressure.  · Needs further evaluation for anemia and treatment since this could contribute to his shortness of breath.  · Would benefit from PFT as outpatient        Thank you for the consultation.  We will follow along        Esther Mac MD  01/15/20  3:14 PM

## 2020-01-15 NOTE — NURSING NOTE
PT seen to assess two areas of Stage 1 pressure injury noted per nursing staff on admission.  Pt tall and quite thin.  Pt's wife states she has noticed two reddened areas on his back and shoulder area at home and sometimes they go away completely and then they will return.  Pt states he sits in recliner most of the day.  Small blancheable areas of erythema on spine and right shoulder area.  Instructed pt and wife re: how pressure ulcers begin, and that blancheable areas of redness are the body's sign that the skin is starting to notice pressure or irritation at those areas.  Discussed using a waffle cushion at home in his recliner, to place behind his back for a little protection and instructed pt to shift weight and lean forward periodically to decrease pressure to these bony areas.  Also encouraged wife to continue to keep an eye on these areas and if open areas would develop to seek medical advice.  Wife and pt state understanding.  Spoke with WAQAS [place] and asked that she obtain a waffle cushion for him.

## 2020-01-15 NOTE — PLAN OF CARE
Problem: Patient Care Overview  Goal: Plan of Care Review  Flowsheets (Taken 1/15/2020 1222)  Progress: improving  Plan of Care Reviewed With: patient;spouse  Outcome Summary: pt evaluated for OT. He was independent w. bed mobility, no A w. LBD socks, Toileting SBA/mod I. toilet tsf, and walking to BR supervision to mod I. grooming skills supervision/mod I. UE very strong. no OT needs at this time. Pt and family understand. As he can complete ADLs well, tsfs, and is strong in B UEs. dc OT.

## 2020-01-15 NOTE — PLAN OF CARE
Pt arrived to the floor around 22:00 on 4 liters. Currently on 1 liter, will continue to wean. No c/o SOA at rest. Pt's lower extremities are +3 right and +4 left, pt states that's his normal. Wound consulted due to stage 1 pressure injury on mid spine and right shoulder blade. Cardio and pulmonary to see pt in the AM. PT and OT to follow. Lungs diminished with fine crackles in base. VSS, safety maintained, will continue to monitor.

## 2020-01-15 NOTE — PLAN OF CARE
Problem: Patient Care Overview  Goal: Plan of Care Review  Flowsheets  Taken 1/15/2020 1059  Plan of Care Reviewed With: patient;spouse  Taken 1/15/2020 1102  Outcome Summary: Patient is a 69 y.o. male admitted to [facility] for COPD, asthma, CHF on 1/14/2020. PMHx includes antiphospholipid syndrome, DVT, esophageal cancer. Patient is independent at baseline and lives with his spouse. Patient reports no stairs to enter home, uses a cane at times. Today, patient performed bed mobility independently, independent for transfers, and ambulated 200 feet with rwx and supervision.  Patient demonstrates impairments consisting of generalized weakness, decreased activity tolerance. Patient may benefit from skilled PT services acutely to address functional deficits as well as improve level of independence prior to discharge. Anticipate home with assist upon DC. Patient encouraged to ambulate with [facility] staff and/or family to maintain mobility status while hospitalized.

## 2020-01-15 NOTE — CONSULTS
Patient Name: Jose Hurtado  Age/Sex: 69 y.o. male  : 1950  MRN: [id number]    Date of Admission: 2020  Date of Encounter Visit: 01/15/20  Encounter Provider: FAYE Martinez  Place of Service: [facility] CARDIOLOG[facility]      Referring Provider: Jose Osborne MD  Patient Care Team:  Brandin Bolton MD as PCP - General (Internal Medicine)  Code, George THORPE II, MD as Consulting Physician (Hematology and Oncology)  Jose Imnan MD as Consulting Physician (Urology)  Mulugeta Millan MD as Consulting Physician (Gastroenterology)  Seth Randhawa MD as Consulting Physician (Ophthalmology)    Subjective:   Admitted/Consulted for: CHF, elevated troponin     Chief Complaint: SOB    History of Present Illness:  Jose Hurtado is a 69 y.o. male with history of HTN, esophageal cancer (s/p chemo/radiation), hypercoagulability from antiphospholipid syndrome on warfarin, lymphedema and prior DVT. He has chronic lower extremity edema L>R. He was first seen by Dr. Aguayo in  for lower extremity edema and it was recommended at that time the ise of compression stockings and elevation. He last presented in 2019 with reports of chest tightness and was seen by Dr. Jorge and underwent stress testing that was negative for ischemia. An echocardiogram performed 19 noted normal LVSF with EF 54% and mild TR/trace MR.     He presented to [facility] ED 20 with reports of progressive worsening shortness of breath that had started 1 week prior. He also reported associated productive cough and chronic BLE. EMS reported oxygenation saturations at 90% on RA, and he was placed on supplemental oxygen therapy. He stated he had been taken off his Lasix 6 months ago by his PCP Dr. Bolton. CXR completed upon arrival to the ED noted mild to moderate right sided pleural effusions and right sided atelectasis. Troponin was 0.033. Normal ProBNP of 551.4. INR 1.76. Hgb 10.2. He was  subsequently admitted and treated with IV steroids/Lasix. A repeated troponin remained eleavted at 0.037, for which we have been consulted. An echocardiogram has been ordered and is pending.      Patient denies any chest pain/pressure. He has not had any near syncope or syncope. He denies PND or orthopnea. He is not tachycardic or hypotensive. He denies fever, chills. He has a nonproductive cough.      CXR 1/14/20  IMPRESSION:  Mild to moderate right pleural effusion and right basilar  atelectasis or infiltrate, follow-up recommended. Tortuous aorta.    Previous Cardiac Testing:    Stress Test 4/19/19  · GI artifact is present.  · Left ventricular ejection fraction is normal (Calculated EF = 70%).  · Myocardial perfusion imaging indicates a normal myocardial perfusion study with no evidence of ischemia.  · Impressions are consistent with a low risk study.    Echocardiogram 4/19/19  · Left ventricular systolic function is normal. Calculated EF = 54.0%. Estimated EF was in agreement with the calculated EF. Estimated EF = 54%. Normal left ventricular cavity size noted. All left ventricular wall segments contract normally. Left ventricular wall thickness is consistent with mild concentric hypertrophy. Left ventricular diastolic function is normal.  · There is moderate thickening of the aortic valve. No aortic valve regurgitation is present. No aortic valve stenosis is present.  · Trace mitral valve regurgitation is present.  · Mild tricuspid valve regurgitation is present. Estimated right ventricular systolic pressure from tricuspid regurgitation is normal (<35 mmHg). Calculated right ventricular systolic pressure from tricuspid regurgitation is 32 mmHg.    Past Medical History:  Past Medical History:   Diagnosis Date   • Anemia    • Anti-phospholipid syndrome (CMS/HCC)     On lifelong anticoagulation therapy   • Bladder disorder     LEAKAGE   ON MED  wers pads   • Community acquired pneumonia     HISTORY OF IN 2014   •  Deep venous thrombosis (CMS/HCC) 2006, 2008    Left lower extremity multiple   • Depression    • Esophageal carcinoma (CMS/HCC) 12/31/2014    had chemo and radiation prior surgery   • Hemorrhoids    • HH (hiatus hernia)    • History of atrial fibrillation 2015    ONE EPISODE WHILE HOSPITALIZED   • History of kidney stones    • History of nephrolithiasis    • History of pancreatitis     PT STATES MANY YEARS AGO   • History of radiation therapy    • Hypertension    • Long-term (current) use of anticoagulants, INR goal 2.0-3.0    • Malignant neoplasm of prostate (CMS/HCC)    • Other hyperlipidemia 1/30/2018 January 30, 2018 lipid panel risk 12.8%   • Restless legs syndrome    • Squamous carcinoma     on the head       Past Surgical History:   Procedure Laterality Date   • APPENDECTOMY  1950   • BRONCHOSCOPY      (Diagnostic)   • CATARACT EXTRACTION Bilateral 2014   • COLONOSCOPY  12/15/2014    Complete / Description: EH, IH, torts, stool, follow-up colonoscopy due in 5 years.   • COLONOSCOPY N/A 6/13/2017    non-thrombosed external hemorrhoids, normal examined ileum, IH   • COLONOSCOPY N/A 2/26/2019    Procedure: COLONOSCOPY with Cold Polypectomy;  Surgeon: Mulugeta Millan MD;  Location: [facility] ENDOSCOPY;  Service: Gastroenterology   • CYSTOSCOPY W/ LASER LITHOTRIPSY     • ENDOSCOPY N/A 6/13/2017    Procedure: ESOPHAGOGASTRODUODENOSCOPY WITH COLD BIOPSY;  Surgeon: Lake Gonzalez MD;  Location: [facility] ENDOSCOPY;  Service:    • ESOPHAGECTOMY      April 2015, stage IIB esophageal carcinoma, sub-total resection.   • ESOPHAGECTOMY      Esophagectomy Subtotal Andrea Joe Procedure   • EXCISION LESION  08/2012    Removal of Squamous Cell CA on Head   • HAMMER TOE REPAIR  09/2014    Hammertoe Operation (Each Toe), 10/2014   • HAMMER TOE REPAIR Left 10/3/2017    Procedure: Left second third and fourth distal interphalangeal joint resection with flexor tenotomy;  Surgeon: Mulugeta Lira MD;  Location: [facility]  OR [facility]SC;  Service:    • HERNIA REPAIR      incisional   • JEJUNOSTOMY      Laparoscopic   • JEJUNOSTOMY      tube removal    • KNEE SURGERY Bilateral 1967, 1973, 1981   • PATELLA SURGERY Left     removed   • PILONIDAL CYST / SINUS EXCISION     • NC TOTAL KNEE ARTHROPLASTY Right 3/26/2018    Procedure: TOTAL KNEE ARTHROPLASTY;  Surgeon: Renny Solis MD;  Location: [facility] MAIN OR;  Service: Orthopedics   • PROSTATECTOMY  2010   • PYLOROPLASTY     • SPINAL FUSION  02/1998    C 5,6   • TONSILLECTOMY  1955   • TONSILLECTOMY     • UPPER GASTROINTESTINAL ENDOSCOPY  12/15/2014    LA Grade D esophagitis, Pardo's, HH, multiple duodenal ulcers   • VENTRAL/INCISIONAL HERNIA REPAIR N/A 4/14/2016    Procedure: VENTRAL/INCISIONAL HERNIA REPAIR, open, with mesh, and component separation;  Surgeon: Darren Rivas MD;  Location: [facility] MAIN OR;  Service:        Home Medications:   Facility-Administered Medications Prior to Admission   Medication Dose Route Frequency Provider Last Rate Last Dose   • cyanocobalamin injection 1,000 mcg  1,000 mcg Intramuscular Q28 Days Brandin Bolton MD   1,000 mcg at 12/27/19 1128     Medications Prior to Admission   Medication Sig Dispense Refill Last Dose   • warfarin (COUMADIN) 5 MG tablet Take 1 tablet by mouth Daily. (Patient taking differently: Take 5 mg by mouth Daily. 5 MG 5 DAYS A WEEK AND 2.5MG 2 DAYS A WEEK) 90 tablet 2 Patient Taking Differently at Unknown time   • ferrous sulfate 325 (65 FE) MG tablet TAKE ONE TABLET BY MOUTH DAILY WITH BREAKFAST (Patient taking differently: TAKE TWO TABLET BY MOUTH DAILY WITH BREAKFAST) 90 tablet 0 Taking   • PARoxetine (PAXIL) 30 MG tablet Take 1 tablet by mouth Every Morning. 90 tablet 1 Taking   • pramipexole (MIRAPEX) 1.5 MG tablet Take 1 tablet by mouth 2 (Two) Times a Day. 180 tablet 1 Taking   • tamsulosin (FLOMAX) 0.4 MG capsule 24 hr capsule Take 1 capsule by mouth Every Night.   Taking   • traZODone (DESYREL) 100 MG tablet Take 1  tablet by mouth Every Night for 180 days. 30 tablet 5 Taking       Allergies:  No Known Allergies    Past Social History:  Social History     Socioeconomic History   • Marital status:      Spouse name: Lena   • Number of children: 0   • Years of education: College   • Highest education level: Not on file   Occupational History   • Occupation: Retired      Comment: Retired    Tobacco Use   • Smoking status: Former Smoker     Packs/day: 2.00     Years: 3.00     Pack years: 6.00     Types: Cigarettes     Last attempt to quit: 1974     Years since quittin.0   • Smokeless tobacco: Never Used   Substance and Sexual Activity   • Alcohol use: Yes     Frequency: 2-3 times a week     Comment: 2-3 x per week   • Drug use: No   • Sexual activity: Defer   Social History Narrative    self-employed         Past Family History:  Family History   Problem Relation Age of Onset   • Cerebral aneurysm Mother         cerebral artery aneurysm ( age 56)   • Prostate cancer Brother 68   • Anxiety disorder Father    • Suicide Attempts Father          of suicide   • Cancer Father         bladder   • No Known Problems Brother    • Colon cancer Neg Hx    • Esophageal cancer Neg Hx    • Dementia Neg Hx        Review of Systems: All systems reviewed. Pertinent positives identified in HPI. All other systems are negative.           Objective:     Objective:  Temp:  [97.4 °F (36.3 °C)-99.3 °F (37.4 °C)] 97.6 °F (36.4 °C)  Heart Rate:  [66-95] 66  Resp:  [17-20] 18  BP: (122-170)/(58-96) 127/80    Intake/Output Summary (Last 24 hours) at 1/15/2020 1043  Last data filed at 1/15/2020 0732  Gross per 24 hour   Intake 240 ml   Output 0 ml   Net 240 ml     Body mass index is 26.45 kg/m².      20  1443 01/15/20  0746   Weight: 93.6 kg (206 lb 6.4 oz) 93.4 kg (206 lb)           Physical Exam:   Physical Exam   Constitutional: He is oriented to person, place, and time. He appears well-developed and  well-nourished.   HENT:   Head: Normocephalic.   Eyes: Pupils are equal, round, and reactive to light.   Neck: No JVD present.   Cardiovascular: Normal rate and regular rhythm. Exam reveals no gallop.   No murmur heard.  Pulmonary/Chest: Effort normal and breath sounds normal. No respiratory distress. He has no wheezes. He has no rales.   Abdominal: Soft. Bowel sounds are normal.   Musculoskeletal: He exhibits edema.   Left > right   Neurological: He is alert and oriented to person, place, and time.   Skin: Skin is warm and dry.   Psychiatric: He has a normal mood and affect. His behavior is normal. Thought content normal.         Lab Review:     Results from last 7 days   Lab Units 01/15/20  0442 01/14/20  1500   SODIUM mmol/L 139 138   POTASSIUM mmol/L 4.0 4.4   CHLORIDE mmol/L 100 101   CO2 mmol/L 23.4 25.2   BUN mg/dL 23 20   CREATININE mg/dL 1.10 1.18   GLUCOSE mg/dL 252* 99   CALCIUM mg/dL 8.5* 8.6       Results from last 7 days   Lab Units 01/14/20  1720 01/14/20  1500   TROPONIN T ng/mL 0.037* 0.033*     Results from last 7 days   Lab Units 01/15/20  0442   WBC 10*3/mm3 5.57   HEMOGLOBIN g/dL 8.8*   HEMATOCRIT % 27.5*   PLATELETS 10*3/mm3 172     Results from last 7 days   Lab Units 01/15/20  0442 01/14/20  2336 01/14/20  1500   INR  1.92* 1.72* 1.76*                 Results from last 7 days   Lab Units 01/14/20  1500   PROBNP pg/mL 551.4               Imaging:      Imaging Results (Most Recent)     Procedure Component Value Units Date/Time    XR Chest 2 View [[id number]] Collected:  01/14/20 1552     Updated:  01/14/20 1557    Narrative:       XR CHEST 2 VW-     HISTORY: Male who is 69 years-old, congestive heart failure     TECHNIQUE: Frontal and lateral views of the chest     COMPARISON: 04/18/2019     FINDINGS: Heart size is normal. Aorta is tortuous. Pulmonary vasculature  is unremarkable. Mild to moderate right pleural effusion and right  basilar atelectasis or infiltrate, follow-up recommended.  No  pneumothorax. Gastric pull-through changes are apparent. Chronic right  rib deformity       Impression:       Mild to moderate right pleural effusion and right basilar  atelectasis or infiltrate, follow-up recommended. Tortuous aorta.     This report was finalized on 1/14/2020 3:54 PM by Dr. Sumanth Boyer M.D.             EKG 1/14/20      Baseline 4/19/19        I personally viewed and interpreted the patient's EKG/Telemetry data.    Assessment:   Assessment/Plan     1. Dyspnea - evaluation underway  2. Elevated troponin - etiology unclear. Low suspicion for ACS based on ECG and symptoms. Preliminary review of echocardiogram shows normal wall motion and normal LVEF. Will await official read. He has a history of VTE due to hypercoagulable state. His INR is subtherapeutic. Reasonable to get CTA chest to rule out PE. He is not tachycardic or hypotensive. This will also allow us to further evaluate pleural effusion. He has normal LV function and a completely normal proBNP.   3. Right pleural effusion - will obtain CT scan as above. Would thoracentesis be helpful for diagnostic purposes? Pulmonary has been consulted. He has been started on gentle diuresis.   4. History of esophageal cancer s/p chemo/radiation  5. Lower extremity edema - L>R he states this is chronic and at baseline.   6. Stress in April 2019 without evidence of ischemia. I do not feel an ischemic evaluation is warranted at this time.   7. Antiphospholipid syndrome - on warfarin. INR is managed by PCP. Currently subtherapeutic. He is on therapeutic lovenox.     Thank you for allowing me to participate in the care of Jose Hurtado. Feel free to contact me directly with any further questions or concerns.     Discussed with Dr. Cyr.     FAYE Martinez  [facility]  01/15/20  10:43 AM

## 2020-01-15 NOTE — H&P
Internal medicine history and physical  INTERNAL MEDICINE   [facility]       Patient Identification:  Name: Jose Hurtado  Age: 69 y.o.  Sex: male  :  1950  MRN: [id number]                   Primary Care Physician: Brandin Bolton MD                                   Chief Complaint: Progressive shortness of breath over the course of last 1 week.    History of Present Illness:   Patient is a 69-year-old male with complicated past medical history including history of hypercoagulable state due to antiphospholipid syndrome which was diagnosed after he developed deep venous thrombosis in the past.  Patient has been on chronic anticoagulation therapy and has had history of paroxysmal atrial fibrillation that occurred once while he was hospitalized after his surgery.  Patient has history of coronary artery disease and has had echocardiogram performed in 2019 which showed ejection fraction of 54% with normal left ventricular size.  Patient follows up with his primary care physician and apparently 6 months ago his Lasix were discontinued.  In this background patient has been progressively getting weaker and weaker and to take could not get up and was getting out of breath.  He called his neighbor to help him out but since his front door was locked they are to call EMS to get him to him.  Patient does not remember any of that so except for feeling poorly initially.  Patient was noted to be hypoxemic initially and was improved with nasal cannula supplementation.  Patient has history of esophageal cancer as well as atrial fibrillation.  Patient denies any chest pain.    Past Medical History:  Past Medical History:   Diagnosis Date   • Anemia    • Anti-phospholipid syndrome (CMS/HCC)     On lifelong anticoagulation therapy   • Bladder disorder     LEAKAGE   ON MED  wers pads   • Community acquired pneumonia     HISTORY OF IN    • Deep venous thrombosis (CMS/HCC) ,     Left lower  extremity multiple   • Depression    • Esophageal carcinoma (CMS/HCC) 12/31/2014    had chemo and radiation prior surgery   • Hemorrhoids    • HH (hiatus hernia)    • History of atrial fibrillation 2015    ONE EPISODE WHILE HOSPITALIZED   • History of kidney stones    • History of nephrolithiasis    • History of pancreatitis     PT STATES MANY YEARS AGO   • History of radiation therapy    • Hypertension    • Long-term (current) use of anticoagulants, INR goal 2.0-3.0    • Malignant neoplasm of prostate (CMS/HCC)    • Other hyperlipidemia 1/30/2018 January 30, 2018 lipid panel risk 12.8%   • Restless legs syndrome    • Squamous carcinoma     on the head     Past Surgical History:  Past Surgical History:   Procedure Laterality Date   • APPENDECTOMY  1950   • BRONCHOSCOPY      (Diagnostic)   • CATARACT EXTRACTION Bilateral 2014   • COLONOSCOPY  12/15/2014    Complete / Description: EH, IH, torts, stool, follow-up colonoscopy due in 5 years.   • COLONOSCOPY N/A 6/13/2017    non-thrombosed external hemorrhoids, normal examined ileum, IH   • COLONOSCOPY N/A 2/26/2019    Procedure: COLONOSCOPY with Cold Polypectomy;  Surgeon: Mulugeta Millan MD;  Location: [facility] ENDOSCOPY;  Service: Gastroenterology   • CYSTOSCOPY W/ LASER LITHOTRIPSY     • ENDOSCOPY N/A 6/13/2017    Procedure: ESOPHAGOGASTRODUODENOSCOPY WITH COLD BIOPSY;  Surgeon: Lake Gonzalez MD;  Location: [facility] ENDOSCOPY;  Service:    • ESOPHAGECTOMY      April 2015, stage IIB esophageal carcinoma, sub-total resection.   • ESOPHAGECTOMY      Esophagectomy Subtotal [place] Procedure   • EXCISION LESION  08/2012    Removal of Squamous Cell CA on Head   • HAMMER TOE REPAIR  09/2014    Hammertoe Operation (Each Toe), 10/2014   • HAMMER TOE REPAIR Left 10/3/2017    Procedure: Left second third and fourth distal interphalangeal joint resection with flexor tenotomy;  Surgeon: Mulugeta Lira MD;  Location: [facility] OR [facility];  Service:    • HERNIA  REPAIR      incisional   • JEJUNOSTOMY      Laparoscopic   • JEJUNOSTOMY      tube removal    • KNEE SURGERY Bilateral 1967, 1973, 1981   • PATELLA SURGERY Left     removed   • PILONIDAL CYST / SINUS EXCISION     • ME TOTAL KNEE ARTHROPLASTY Right 3/26/2018    Procedure: TOTAL KNEE ARTHROPLASTY;  Surgeon: Renny Solis MD;  Location: [facility] OR;  Service: Orthopedics   • PROSTATECTOMY  2010   • PYLOROPLASTY     • SPINAL FUSION  02/1998    C 5,6   • TONSILLECTOMY  1955   • TONSILLECTOMY     • UPPER GASTROINTESTINAL ENDOSCOPY  12/15/2014    LA Grade D esophagitis, Pardo's, HH, multiple duodenal ulcers   • VENTRAL/INCISIONAL HERNIA REPAIR N/A 4/14/2016    Procedure: VENTRAL/INCISIONAL HERNIA REPAIR, open, with mesh, and component separation;  Surgeon: Darren Rivas MD;  Location: [facility] OR;  Service:       Home Meds:    (Not in a hospital admission)  Current Meds:     Current Facility-Administered Medications:   •  cyanocobalamin injection 1,000 mcg, 1,000 mcg, Intramuscular, Q28 Days, Brandin Bolton MD, 1,000 mcg at 12/27/19 1128  •  sodium chloride 0.9 % flush 10 mL, 10 mL, Intravenous, PRN, Felix Nelson MD, 10 mL at 01/14/20 1503    Current Outpatient Medications:   •  ferrous sulfate 325 (65 FE) MG tablet, TAKE ONE TABLET BY MOUTH DAILY WITH BREAKFAST (Patient taking differently: TAKE TWO TABLET BY MOUTH DAILY WITH BREAKFAST), Disp: 90 tablet, Rfl: 0  •  PARoxetine (PAXIL) 30 MG tablet, Take 1 tablet by mouth Every Morning., Disp: 90 tablet, Rfl: 1  •  pramipexole (MIRAPEX) 1.5 MG tablet, Take 1 tablet by mouth 2 (Two) Times a Day., Disp: 180 tablet, Rfl: 1  •  tamsulosin (FLOMAX) 0.4 MG capsule 24 hr capsule, Take 1 capsule by mouth Every Night., Disp: , Rfl:   •  traZODone (DESYREL) 100 MG tablet, Take 1 tablet by mouth Every Night for 180 days., Disp: 30 tablet, Rfl: 5  •  warfarin (COUMADIN) 5 MG tablet, Take 1 tablet by mouth Daily. (Patient taking differently: Take 5 mg by mouth Daily.  "5 MG 5 DAYS A WEEK AND 2.5MG 2 DAYS A WEEK), Disp: 90 tablet, Rfl: 2  Allergies:  No Known Allergies  Social History:   Social History     Tobacco Use   • Smoking status: Former Smoker     Packs/day: 2.00     Years: 3.00     Pack years: 6.00     Types: Cigarettes     Last attempt to quit: 1974     Years since quittin.0   • Smokeless tobacco: Never Used   Substance Use Topics   • Alcohol use: Yes     Frequency: 2-3 times a week     Comment: 2-3 x per week      Family History:  Family History   Problem Relation Age of Onset   • Cerebral aneurysm Mother         cerebral artery aneurysm ( age 56)   • Prostate cancer Brother 68   • Anxiety disorder Father    • Suicide Attempts Father          of suicide   • Cancer Father         bladder   • No Known Problems Brother    • Colon cancer Neg Hx    • Esophageal cancer Neg Hx    • Dementia Neg Hx           Review of Systems  See history of present illness and past medical history.   Constitutional: Negative for activity change, appetite change and fever.   HENT: Negative for congestion and sore throat.    Respiratory: Positive for cough (productive with clear sputum) and shortness of breath.    Cardiovascular: Positive for leg swelling (chronic BLE). Negative for chest pain.   Gastrointestinal: Negative for abdominal pain, diarrhea and vomiting.   Genitourinary: Negative for decreased urine volume and dysuria.   Musculoskeletal: Negative for neck pain.   Skin: Negative for rash and wound.   Neurological: Negative for weakness, numbness and headaches.   All other systems reviewed and are negative.    Vitals:   /58 (BP Location: Left arm, Patient Position: Sitting)   Pulse 78   Temp 99.3 °F (37.4 °C) (Oral)   Resp 17   Ht 188 cm (74\")   Wt 93.6 kg (206 lb 6.4 oz)   SpO2 98%   BMI 26.50 kg/m²   I/O: No intake or output data in the 24 hours ending 20 1900  Exam:  General Appearance:    Alert, cooperative, no distress, appears stated age   Head:    " Normocephalic, without obvious abnormality, atraumatic   Eyes:    PERRL, conjunctiva/corneas clear, EOM's intact, both eyes   Ears:    Normal external ear canals, both ears   Nose:   Nares normal, septum midline, mucosa normal, no drainage    or sinus tenderness   Throat:   Lips, tongue, gums normal; oral mucosa pink and moist   Neck:   Supple, symmetrical, trachea midline, no adenopathy;     thyroid:  no enlargement/tenderness/nodules; no carotid    bruit or JVD   Back:     Symmetric, no curvature, ROM normal, no CVA tenderness   Lungs:    Decreased breath sounds at the bases   Chest Wall:    No tenderness or deformity    Heart:    Regular rate and rhythm, S1 and S2 normal, no murmur, rub   or gallop   Abdomen:     Soft, non-tender, bowel sounds active all four quadrants,    Extremities:  Chronic bilateral lower extremity edema   Pulses:   Pulses palpable in all extremities; symmetric all extremities   Skin:   Skin color normal, Skin is warm and dry,  no rashes or palpable lesions   Neurologic:  Grossly nonfocal       Data Review:      I reviewed the patient's new clinical results.  Results from last 7 days   Lab Units 01/14/20  1500   WBC 10*3/mm3 5.62   HEMOGLOBIN g/dL 10.2*   PLATELETS 10*3/mm3 185     Results from last 7 days   Lab Units 01/14/20  1500   SODIUM mmol/L 138   POTASSIUM mmol/L 4.4   CHLORIDE mmol/L 101   CO2 mmol/L 25.2   BUN mg/dL 20   CREATININE mg/dL 1.18   CALCIUM mg/dL 8.6   GLUCOSE mg/dL 99       Assessment:  Active Hospital Problems    Diagnosis POA   • **Shortness of breath [R06.02] Yes   • Long-term (current) use of anticoagulants, INR goal 2.0-3.0 [Z79.01] Not Applicable   • Lymphedema [I89.0] Unknown   • Anti-phospholipid syndrome (CMS/HCC) [D68.61] Yes     DVT LLE in 2003 and 2006  Antiphospholipid syndrome  On life-long AC on warfarin.   GOAL INR : 2 -3      • Hypertension [I10] Yes   • Malignant neoplasm of prostate (CMS/HCC) [C61] Yes     *Storm  S/p prostatectomy 2010  Continue  follow-up with urology.      • Adenocarcinoma of esophagus (CMS/HCC) [C15.9] Yes     Dx'ed 2014: s/p surgery ([facility]), XRT, chemotherapy  *CBC (Code)         Medical decision making:  Significant shortness of breath in the context of recent discontinuation of Lasix as well as ongoing lower extremity edema-this likely represent exacerbation of underlying COPD/asthma versus exacerbation of congestive heart failure and volume overload versus recurrent PE as his INR is subtherapeutic.  Plan is to continue with nebulizer treatment check stat ABG and consult cardiology and pulmonary service.Continue iv diuretics and continue his warfarin.  Provide with therapeutic dose of Lovenox until INR is therapeutic.  Will defer it to pulmonary or cardiology service if formal testing to rule out PE is indicated in this situation and if that is the case patient may need CT scan of the chest PE protocol.  Elevated troponin with no symptoms of chest pain-significance unclear-plan is to check serial troponin and cardiology consultation.  Cancer of the esophagus status post chemotherapy and surgery.  Hypertension-continue antihypertensive regimen  History of DVT with hypercoagulable state last DVT in 2006 and recommended to have long-term anticoagulation therapy with warfarin.  Anemia multifactorial-monitor.    Jose Osborne MD   1/14/2020  7:00 PM  Much of this encounter note is an electronic transcription/translation of spoken language to printed text. The electronic translation of spoken language may permit erroneous, or at times, nonsensical words or phrases to be inadvertently transcribed; Although I have reviewed the note for such errors, some may still exist

## 2020-01-15 NOTE — THERAPY DISCHARGE NOTE
Acute Care - Occupational Therapy Initial Eval/Discharge  [facility]     Patient Name: Jose Hurtado  : 1950  MRN: [id number]  Today's Date: 1/15/2020               Admit Date: 2020       ICD-10-CM ICD-9-CM   1. Shortness of breath R06.02 786.05   2. Hypoxia R09.02 799.02   3. Pleural effusion on right J90 511.9   4. Bilateral leg edema R60.0 782.3   5. B12 deficiency E53.8 266.2     Patient Active Problem List   Diagnosis   • Adenocarcinoma of esophagus (CMS/HCC)   • Adenomatous polyp of colon   • Malignant neoplasm of prostate (CMS/HCC)   • RLS (restless legs syndrome)   • Depression   • Hypertension   • Anti-phospholipid syndrome (CMS/HCC)   • Anemia   • Insomnia due to other mental disorder   • Peripheral polyneuropathy   • B12 deficiency   • Postsurgical malabsorption   • Shortness of breath   • Long-term (current) use of anticoagulants, INR goal 2.0-3.0   • Lymphedema     Past Medical History:   Diagnosis Date   • Anemia    • Anti-phospholipid syndrome (CMS/HCC)     On lifelong anticoagulation therapy   • Bladder disorder     LEAKAGE   ON MED  wers pads   • Community acquired pneumonia     HISTORY OF IN    • Deep venous thrombosis (CMS/HCC) ,     Left lower extremity multiple   • Depression    • Esophageal carcinoma (CMS/HCC) 2014    had chemo and radiation prior surgery   • Hemorrhoids    • HH (hiatus hernia)    • History of atrial fibrillation 2015    ONE EPISODE WHILE HOSPITALIZED   • History of kidney stones    • History of nephrolithiasis    • History of pancreatitis     PT STATES MANY YEARS AGO   • History of radiation therapy    • Hypertension    • Long-term (current) use of anticoagulants, INR goal 2.0-3.0    • Malignant neoplasm of prostate (CMS/HCC)    • Other hyperlipidemia 2018 lipid panel risk 12.8%   • Restless legs syndrome    • Squamous carcinoma     on the head     Past Surgical History:   Procedure Laterality Date   • APPENDECTOMY   1950   • BRONCHOSCOPY      (Diagnostic)   • CATARACT EXTRACTION Bilateral 2014   • COLONOSCOPY  12/15/2014    Complete / Description: EH, IH, torts, stool, follow-up colonoscopy due in 5 years.   • COLONOSCOPY N/A 6/13/2017    non-thrombosed external hemorrhoids, normal examined ileum, IH   • COLONOSCOPY N/A 2/26/2019    Procedure: COLONOSCOPY with Cold Polypectomy;  Surgeon: Mulugeta Millan MD;  Location: [facility] ENDOSCOPY;  Service: Gastroenterology   • CYSTOSCOPY W/ LASER LITHOTRIPSY     • ENDOSCOPY N/A 6/13/2017    Procedure: ESOPHAGOGASTRODUODENOSCOPY WITH COLD BIOPSY;  Surgeon: Lake Gonzalez MD;  Location: [facility] ENDOSCOPY;  Service:    • ESOPHAGECTOMY      April 2015, stage IIB esophageal carcinoma, sub-total resection.   • ESOPHAGECTOMY      Esophagectomy Subtotal Andrea Joe Procedure   • EXCISION LESION  08/2012    Removal of Squamous Cell CA on Head   • HAMMER TOE REPAIR  09/2014    Hammertoe Operation (Each Toe), 10/2014   • HAMMER TOE REPAIR Left 10/3/2017    Procedure: Left second third and fourth distal interphalangeal joint resection with flexor tenotomy;  Surgeon: Mulugeta Lira MD;  Location: [facility] OR OSC;  Service:    • HERNIA REPAIR      incisional   • JEJUNOSTOMY      Laparoscopic   • JEJUNOSTOMY      tube removal    • KNEE SURGERY Bilateral 1967, 1973, 1981   • PATELLA SURGERY Left     removed   • PILONIDAL CYST / SINUS EXCISION     • NJ TOTAL KNEE ARTHROPLASTY Right 3/26/2018    Procedure: TOTAL KNEE ARTHROPLASTY;  Surgeon: Renny Solis MD;  Location: [facility] MAIN OR;  Service: Orthopedics   • PROSTATECTOMY  2010   • PYLOROPLASTY     • SPINAL FUSION  02/1998    C 5,6   • TONSILLECTOMY  1955   • TONSILLECTOMY     • UPPER GASTROINTESTINAL ENDOSCOPY  12/15/2014    LA Grade D esophagitis, Pardo's, HH, multiple duodenal ulcers   • VENTRAL/INCISIONAL HERNIA REPAIR N/A 4/14/2016    Procedure: VENTRAL/INCISIONAL HERNIA REPAIR, open, with mesh, and component separation;  Surgeon:  Darren Rivas MD;  Location: [facility] MAIN OR;  Service:           OT ASSESSMENT FLOWSHEET (last 12 hours)      Occupational Therapy Evaluation     Row Name 01/15/20 1222                   OT Evaluation Time/Intention    Subjective Information  no complaints  -        Document Type  discharge evaluation/summary  -        Mode of Treatment  individual therapy;occupational therapy  -        Patient Effort  excellent  -KP        Symptoms Noted During/After Treatment  none  -KP           General Information    Patient Profile Reviewed?  yes  -KP        Patient Observations  cooperative;agree to therapy;alert  -        Patient/Family Observations  pt supine in bed. wife present in room.  -        Prior Level of Function  independent:;transfer;ADL's  -        Existing Precautions/Restrictions  no known precautions/restrictions  -           Cognitive Assessment/Intervention- PT/OT    Orientation Status (Cognition)  oriented x 4  -        Follows Commands (Cognition)  WFL  -           Bed Mobility Assessment/Treatment    Supine-Sit [place] (Bed Mobility)  independent  -        Sit-Supine [place] (Bed Mobility)  independent  -           Functional Mobility    Functional Mobility- Ind. Level  conditional independence;supervision required  -        Functional Mobility- Comment  in room, to BR, to bed  -           Transfer Assessment/Treatment    Transfer Assessment/Treatment  sit-stand transfer;stand-sit transfer;toilet transfer  -           Sit-Stand Transfer    Sit-Stand [place] (Transfers)  conditional independence  -           Stand-Sit Transfer    Stand-Sit [place] (Transfers)  set up;conditional independence  -           Toilet Transfer    Type (Toilet Transfer)  sit-stand;stand-sit  -        [place] Level (Toilet Transfer)  conditional independence  -        Assistive Device (Toilet Transfer)  grab bars/safety frame  -           ADL  Assessment/Intervention    BADL Assessment/Intervention  grooming;lower body dressing;toileting  -           Lower Body Dressing Assessment/Training    Lower Body Dressing [place] Level  lower body dressing skills;doff;don;socks;independent  -        Lower Body Dressing Position  edge of bed sitting  -           Grooming Assessment/Training    [place] Level (Grooming)  grooming skills;hair care, combing/brushing;wash face, hands;independent  -        Grooming Position  sink side  -           Toileting Assessment/Training    [place] Level (Toileting)  toileting skills;conditional independence;change pad/brief;adjust/manage clothing  -        Toileting Position  supported standing;unsupported sitting  -           General ROM    GENERAL ROM COMMENTS  B UE 8/8  -KP           MMT (Manual Muscle Testing)    General MMT Comments  B UE 5/5  -           Motor Assessment/Interventions    Additional Documentation  Balance (Group);Fine Motor Testing & Training (Group);Balance Interventions (Group)  -           Balance    Balance  static sitting balance;static standing balance  -           Static Sitting Balance    Level of [place] (Unsupported Sitting, Static Balance)  independent  -        Sitting Position (Unsupported Sitting, Static Balance)  sitting on edge of bed  -        Time Able to Maintain Position (Unsupported Sitting, Static Balance)  2 to 3 minutes  -           Static Standing Balance    Level of [place] (Supported Standing, Static Balance)  supervision;conditional independence  -        Time Able to Maintain Position (Supported Standing, Static Balance)  3 to 4 minutes  -           Positioning and Restraints    Pre-Treatment Position  in bed  -KP        Post Treatment Position  bed  -KP        In Bed  supine;call light within reach;encouraged to call for assist;with family/caregiver  -           Pain Scale: Numbers Pre/Post-Treatment    Pain Scale: Numbers,  Pretreatment  0/10 - no pain  -KP           Wound 01/14/20 2255 midline thoracic spine Pressure Injury    Wound - Properties Group Date first assessed: 01/14/20  -TS Time first assessed: 2255  -TS Present on Hospital Admission: Y  -TS Orientation: midline  -TS Location: thoracic spine  -TS Primary Wound Type: Pressure inj  -TS Stage, Pressure Injury: Stage 1  -TS       Wound 01/14/20 2255 Right lower;posterior shoulder Pressure Injury    Wound - Properties Group Date first assessed: 01/14/20  -TS Time first assessed: 2255  -TS Present on Hospital Admission: Y  -TS Side: Right  -TS Orientation: lower;posterior  -TS Location: shoulder  -TS Primary Wound Type: Pressure inj  -TS Stage, Pressure Injury: Stage 1  -TS       Plan of Care Review    Plan of Care Reviewed With  patient;spouse  -KP        Progress  improving  -KP        Outcome Summary  pt evaluated for OT. He was independent w. bed mobility, no A w. LBD socks, Toileting SBA/mod I. toilet tsf, and walking to BR supervision to mod I. grooming skills supervision/mod I. UE very strong. no OT needs at this time. Pt and family understand. As he can complete ADLs well, tsfs, and is strong in B UEs. dc OT.  -KP           Clinical Impression (OT)    Criteria for Skilled Therapeutic Interventions Met (OT Eval)  no problems identified which require skilled intervention  -KP        Therapy Frequency (OT Eval)  evaluation only  -KP           Patient Education Goal (OT)    Activity (Patient Education Goal, OT)  ed on toilet tsf and ADL safety  -KP        [place]/Cues/Accuracy (Memory Goal 2, OT)  demonstrates adequately;verbalizes understanding  -KP        Time Frame (Patient Education Goal, OT)  short term goal (STG);1 day  -KP        Progress/Outcome (Patient Education Goal, OT)  goal met  -KP           Discharge Summary (Occupational Therapy)    Additional Documentation  Discharge Summary, OT Eval (Group)  -KP           Discharge Summary, OT Eval    Reason for  Discharge (OT Discharge Summary)  patient met all goals and outcomes;no further needs identified  -KP        Outcomes Achieved Upon Discharge (OT Discharge Summary)  able to achieve all goals within established timeline  -KP          User Key  (r) = Recorded By, (t) = Taken By, (c) = Cosigned By    Initials Name Effective Dates     Rubina George, OTR 06/08/18 -     TS Guera Wynne RN 07/17/19 -               OT Recommendation and Plan  Outcome Summary/Treatment Plan (OT)  Reason for Discharge (OT Discharge Summary): patient met all goals and outcomes, no further needs identified  Therapy Frequency (OT Eval): evaluation only  Plan of Care Review  Plan of Care Reviewed With: patient, spouse  Plan of Care Reviewed With: patient, spouse  Outcome Summary: pt evaluated for OT. He was independent w. bed mobility, no A w. LBD socks, Toileting SBA/mod I. toilet tsf, and walking to BR supervision to mod I. grooming skills supervision/mod I. UE very strong. no OT needs at this time. Pt and family understand. As he can complete ADLs well, tsfs, and is strong in B UEs. dc OT.     Rehab Goal Summary     Row Name 01/15/20 1222 01/15/20 1100          Gait Training Goal 1 (PT)    Activity/Assistive Device (Gait Training Goal 1, PT)  --  gait (walking locomotion) with least restrictive AD   -EM     [place] Level (Gait Training Goal 1, PT)  --  supervision required  -EM     Distance (Gait Goal 1, PT)  --  250  -EM     Time Frame (Gait Training Goal 1, PT)  --  1 week  -EM        Patient Education Goal (PT)    Activity (Patient Education Goal, PT)  --  patient able to complete HEP for LE strengthening  -EM     [place]/Cues/Accuracy (Memory Goal 2, PT)  --  demonstrates adequately  -EM     Time Frame (Patient Education Goal, PT)  --  1 week  -EM        Patient Education Goal (OT)    Activity (Patient Education Goal, OT)  ed on toilet tsf and ADL safety  -KP  --     [place]/Cues/Accuracy (Memory Goal 2,  OT)  demonstrates adequately;verbalizes understanding  -  --     Time Frame (Patient Education Goal, OT)  short term goal (STG);1 day  -  --     Progress/Outcome (Patient Education Goal, OT)  goal met  -  --       User Key  (r) = Recorded By, (t) = Taken By, (c) = Cosigned By    Initials Name Provider Type Discipline    Rubina Marr OTR Occupational Therapist OT    EM Radha Couch, PT Physical Therapist PT          Outcome Measures     Row Name 01/15/20 1231             How much help from another is currently needed...    Putting on and taking off regular lower body clothing?  4  -KP      Bathing (including washing, rinsing, and drying)  4  -KP      Toileting (which includes using toilet bed pan or urinal)  4  -KP      Putting on and taking off regular upper body clothing  4  -KP      Taking care of personal grooming (such as brushing teeth)  4  -KP      Eating meals  4  -KP      AM-PAC 6 Clicks Score (OT)  24  -         Functional Assessment    Outcome Measure Options  AM-PAC 6 Clicks Daily Activity (OT)  -        User Key  (r) = Recorded By, (t) = Taken By, (c) = Cosigned By    Initials Name Provider Type    Rubina Marr OTR Occupational Therapist          Time Calculation:   Time Calculation- OT     Row Name 01/15/20 1231             Time Calculation- OT    OT Start Time  1014  -      OT Stop Time  1032  -      OT Time Calculation (min)  18 min  -      Total Timed Code Minutes- OT  10 minute(s)  -      OT Received On  01/15/20  -        User Key  (r) = Recorded By, (t) = Taken By, (c) = Cosigned By    Initials Name Provider Type    Rubina Marr OTR Occupational Therapist        Therapy Suggested Charges     Code   Minutes Charges    None           Therapy Charges for Today     Code Description Service Date Service Provider Modifiers Qty    [id number] HC OT SELF CARE/MGMT/TRAIN EA 15 MIN 1/15/2020 Rubina George OTR GO 1    [id number]  HC OT EVAL LOW COMPLEXITY 2 1/15/2020 Rubina George, OTR GO 1                    Rubina George, OTR  1/15/2020

## 2020-01-15 NOTE — PROGRESS NOTES
Pharmacy Consult: Warfarin Dosing/ Monitoring    Jose Hurtado is a 69 y.o. male, estimated creatinine clearance is 76 mL/min (by C-G formula based on SCr of 1.18 mg/dL). weighing 93.6 kg (206 lb 6.4 oz).    He has a past medical history of Anemia, Anti-phospholipid syndrome (CMS/HCC), Bladder disorder, Community acquired pneumonia, Deep venous thrombosis (CMS/HCC) (, ), Depression, Esophageal carcinoma (CMS/HCC) (2014), Hemorrhoids, HH (hiatus hernia), History of atrial fibrillation (), History of kidney stones, History of nephrolithiasis, History of pancreatitis, History of radiation therapy, Hypertension, Long-term (current) use of anticoagulants, INR goal 2.0-3.0, Malignant neoplasm of prostate (CMS/[facility]), Other hyperlipidemia (2018), Restless legs syndrome, and Squamous carcinoma.    Social History     Tobacco Use    Smoking status: Former Smoker     Packs/day: 2.00     Years: 3.00     Pack years: 6.00     Types: Cigarettes     Last attempt to quit: 1974     Years since quittin.0    Smokeless tobacco: Never Used   Substance Use Topics    Alcohol use: Yes     Frequency: 2-3 times a week     Comment: 2-3 x per week    Drug use: No       Results from last 7 days   Lab Units 20  2336 20  1500   INR  1.72* 1.76*   HEMOGLOBIN g/dL  --  10.2*   HEMATOCRIT %  --  30.9*   PLATELETS 10*3/mm3  --  185     Results from last 7 days   Lab Units 20  1500   SODIUM mmol/L 138   POTASSIUM mmol/L 4.4   CHLORIDE mmol/L 101   CO2 mmol/L 25.2   BUN mg/dL 20   CREATININE mg/dL 1.18   CALCIUM mg/dL 8.6   BILIRUBIN mg/dL 0.4   ALK PHOS U/L 106   ALT (SGPT) U/L 9   AST (SGOT) U/L 14   GLUCOSE mg/dL 99     Anticoagulation history: Patient instructed to decrease warfarin dosing regimen to 2.5mg every other day on 20 due to an INR of 4.8    Hospital Anticoagulation:  Consulting provider: Dr. Osborne  Start date: prior to admission  Indication: atrial fibrillation, DVT/PE  Target INR:  2-3  Expected duration: indefinite   Bridge Therapy: Yes- Lovenox 1mg/kg q12h               Date 1/15 1/7 1/3          INR 1.76 4.8 8.0          Warfarin dose None yet Held- told to restart on 1/8 held            Potential drug interactions: increased bleeding risk with paroxetine and trazodone    Education complete?/ Date: TBD    Assessment/Plan:  INR currently subtherapeutic at 1.76. Will order warfarin 5mg dose for tonight. Dr. Osborne ordered therapeutic Lovenox to be used as a bridge until the INR is therapeutic.  Daily INR is ordered to evaluate therapy.    Pharmacy will continue to follow until discharge or discontinuation of warfarin.     Mary Camilo, ABDIAS  1/15/2020

## 2020-01-15 NOTE — THERAPY EVALUATION
Patient Name: Jose Hurtado  : 1950    MRN: [id number]                              Today's Date: 1/15/2020       Admit Date: 2020    Visit Dx:     ICD-10-CM ICD-9-CM   1. Shortness of breath R06.02 786.05   2. Hypoxia R09.02 799.02   3. Pleural effusion on right J90 511.9   4. Bilateral leg edema R60.0 782.3   5. B12 deficiency E53.8 266.2     Patient Active Problem List   Diagnosis   • Adenocarcinoma of esophagus (CMS/HCC)   • Adenomatous polyp of colon   • Malignant neoplasm of prostate (CMS/HCC)   • RLS (restless legs syndrome)   • Depression   • Hypertension   • Anti-phospholipid syndrome (CMS/HCC)   • Anemia   • Insomnia due to other mental disorder   • Peripheral polyneuropathy   • B12 deficiency   • Postsurgical malabsorption   • Shortness of breath   • Long-term (current) use of anticoagulants, INR goal 2.0-3.0   • Lymphedema     Past Medical History:   Diagnosis Date   • Anemia    • Anti-phospholipid syndrome (CMS/HCC)     On lifelong anticoagulation therapy   • Bladder disorder     LEAKAGE   ON MED  wers pads   • Community acquired pneumonia     HISTORY OF IN    • Deep venous thrombosis (CMS/HCC) ,     Left lower extremity multiple   • Depression    • Esophageal carcinoma (CMS/HCC) 2014    had chemo and radiation prior surgery   • Hemorrhoids    • HH (hiatus hernia)    • History of atrial fibrillation 2015    ONE EPISODE WHILE HOSPITALIZED   • History of kidney stones    • History of nephrolithiasis    • History of pancreatitis     PT STATES MANY YEARS AGO   • History of radiation therapy    • Hypertension    • Long-term (current) use of anticoagulants, INR goal 2.0-3.0    • Malignant neoplasm of prostate (CMS/HCC)    • Other hyperlipidemia 2018 lipid panel risk 12.8%   • Restless legs syndrome    • Squamous carcinoma     on the head     Past Surgical History:   Procedure Laterality Date   • APPENDECTOMY     • BRONCHOSCOPY      (Diagnostic)   •  CATARACT EXTRACTION Bilateral 2014   • COLONOSCOPY  12/15/2014    Complete / Description: EH, IH, torts, stool, follow-up colonoscopy due in 5 years.   • COLONOSCOPY N/A 6/13/2017    non-thrombosed external hemorrhoids, normal examined ileum, IH   • COLONOSCOPY N/A 2/26/2019    Procedure: COLONOSCOPY with Cold Polypectomy;  Surgeon: Mulugeta Millan MD;  Location: [facility];  Service: Gastroenterology   • CYSTOSCOPY W/ LASER LITHOTRIPSY     • ENDOSCOPY N/A 6/13/2017    Procedure: ESOPHAGOGASTRODUODENOSCOPY WITH COLD BIOPSY;  Surgeon: Lake Gonzalez MD;  Location: [facility];  Service:    • ESOPHAGECTOMY      April 2015, stage IIB esophageal carcinoma, sub-total resection.   • ESOPHAGECTOMY      Esophagectomy Subtotal Wells Joe Procedure   • EXCISION LESION  08/2012    Removal of Squamous Cell CA on Head   • HAMMER TOE REPAIR  09/2014    Hammertoe Operation (Each Toe), 10/2014   • HAMMER TOE REPAIR Left 10/3/2017    Procedure: Left second third and fourth distal interphalangeal joint resection with flexor tenotomy;  Surgeon: Mulugeta Lira MD;  Location: [facility];  Service:    • HERNIA REPAIR      incisional   • JEJUNOSTOMY      Laparoscopic   • JEJUNOSTOMY      tube removal    • KNEE SURGERY Bilateral 1967, 1973, 1981   • PATELLA SURGERY Left     removed   • PILONIDAL CYST / SINUS EXCISION     • ME TOTAL KNEE ARTHROPLASTY Right 3/26/2018    Procedure: TOTAL KNEE ARTHROPLASTY;  Surgeon: Renny Solis MD;  Location: [facility];  Service: Orthopedics   • PROSTATECTOMY  2010   • PYLOROPLASTY     • SPINAL FUSION  02/1998    C 5,6   • TONSILLECTOMY  1955   • TONSILLECTOMY     • UPPER GASTROINTESTINAL ENDOSCOPY  12/15/2014    LA Grade D esophagitis, Pardo's, HH, multiple duodenal ulcers   • VENTRAL/INCISIONAL HERNIA REPAIR N/A 4/14/2016    Procedure: VENTRAL/INCISIONAL HERNIA REPAIR, open, with mesh, and component separation;  Surgeon: Darren Rivas MD;  Location:   [facility];  Service:      General Information     Row Name 01/15/20 1055          PT Evaluation Time/Intention    Document Type  evaluation  -EM     Mode of Treatment  individual therapy;physical therapy  -EM     Row Name 01/15/20 1055          General Information    Patient Profile Reviewed?  yes  -EM     Prior Level of Function  independent:;all household mobility  -EM     Existing Precautions/Restrictions  no known precautions/restrictions  -EM     Row Name 01/15/20 1055          Relationship/Environment    Lives With  spouse  -EM     Row Name 01/15/20 1055          Resource/Environmental Concerns    Current Living Arrangements  home/apartment/condo  -EM     Row Name 01/15/20 1055          Home Main Entrance    Number of Stairs, Main Entrance  none  -EM     Row Name 01/15/20 1055          Cognitive Assessment/Intervention- PT/OT    Orientation Status (Cognition)  oriented x 4  -EM     Row Name 01/15/20 1055          Safety Issues, Functional Mobility    Impairments Affecting Function (Mobility)  endurance/activity tolerance;strength  -EM       User Key  (r) = Recorded By, (t) = Taken By, (c) = Cosigned By    Initials Name Provider Type    EM Radha Couch, PT Physical Therapist        Mobility     Row Name 01/15/20 1057          Bed Mobility Assessment/Treatment    Bed Mobility Assessment/Treatment  supine-sit;sit-supine  -EM     Supine-Sit [place] (Bed Mobility)  independent  -EM     Sit-Supine [place] (Bed Mobility)  independent  -EM     Assistive Device (Bed Mobility)  head of bed elevated  -EM     Row Name 01/15/20 1057          Sit-Stand Transfer    Sit-Stand [place] (Transfers)  conditional independence  -EM     Assistive Device (Sit-Stand Transfers)  walker, front-wheeled  -EM     Row Name 01/15/20 1057          Gait/Stairs Assessment/Training    [place] Level (Gait)  supervision  -EM     Assistive Device (Gait)  walker, front-wheeled  -EM     Distance in Feet (Gait)  200  -EM      Deviations/Abnormal Patterns (Gait)  stride length decreased;base of support, narrow  -EM     Bilateral Gait Deviations  forward flexed posture  -EM       User Key  (r) = Recorded By, (t) = Taken By, (c) = Cosigned By    Initials Name Provider Type    EM Radha Couch PT Physical Therapist        Obj/Interventions     Row Name 01/15/20 1058          General ROM    GENERAL ROM COMMENTS  ROM WNL   -EM     Row Name 01/15/20 1058          MMT (Manual Muscle Testing)    General MMT Comments  no focal deficits identified   -EM     Row Name 01/15/20 1058          Static Sitting Balance    Level of [place] (Unsupported Sitting, Static Balance)  independent  -EM     Sitting Position (Unsupported Sitting, Static Balance)  sitting on edge of bed  -EM     Row Name 01/15/20 1058          Dynamic Sitting Balance    Level of [place], Reaches Outside Midline (Sitting, Dynamic Balance)  independent  -EM     Sitting Position, Reaches Outside Midline (Sitting, Dynamic Balance)  sitting on edge of bed  -EM     Row Name 01/15/20 1058          Static Standing Balance    Level of [place] (Supported Standing, Static Balance)  supervision  -EM     Assistive Device Utilized (Supported Standing, Static Balance)  walker, rolling  -EM     Row Name 01/15/20 1058          Dynamic Standing Balance    Level of [place], Reaches Outside Midline (Standing, Dynamic Balance)  supervision  -EM     Assistive Device Utilized (Supported Standing, Dynamic Balance)  walker, rolling  -     Row Name 01/15/20 1058          Sensory Assessment/Intervention    Sensory General Assessment  no sensation deficits identified  -EM       User Key  (r) = Recorded By, (t) = Taken By, (c) = Cosigned By    Initials Name Provider Type    EM Radha Couch PT Physical Therapist        Goals/Plan     Row Name 01/15/20 1100          Gait Training Goal 1 (PT)    Activity/Assistive Device (Gait Training Goal 1, PT)  gait (walking locomotion)  with least restrictive AD   -EM     [place]eter Level (Gait Training Goal 1, PT)  supervision required  -EM     Distance (Gait Goal 1, PT)  250  -EM     Time Frame (Gait Training Goal 1, PT)  1 week  -EM     Row Name 01/15/20 1100          Patient Education Goal (PT)    Activity (Patient Education Goal, PT)  patient able to complete HEP for LE strengthening  -EM     [place]/Cues/Accuracy (Memory Goal 2, PT)  demonstrates adequately  -EM     Time Frame (Patient Education Goal, PT)  1 week  -EM       User Key  (r) = Recorded By, (t) = Taken By, (c) = Cosigned By    Initials Name Provider Type    Radha Kohler, PT Physical Therapist        Clinical Impression     Row Name 01/15/20 1059          Pain Assessment    Additional Documentation  Pain Scale: Numbers Pre/Post-Treatment (Group)  -EM     Row Name 01/15/20 1059          Pain Scale: Numbers Pre/Post-Treatment    Pain Scale: Numbers, Pretreatment  0/10 - no pain  -EM     Row Name 01/15/20 1059          Plan of Care Review    Plan of Care Reviewed With  patient;spouse  -EM     Row Name 01/15/20 1059          Physical Therapy Clinical Impression    Patient/Family Goals Statement (PT Clinical Impression)  get stronger, go home   -EM     Criteria for Skilled Interventions Met (PT Clinical Impression)  yes;treatment indicated  -EM     Rehab Potential (PT Clinical Summary)  good, to achieve stated therapy goals  -EM     Row Name 01/15/20 1059          Positioning and Restraints    Pre-Treatment Position  in bed  -EM     Post Treatment Position  bed  -EM     In Bed  sitting;call light within reach;with family/caregiver  -EM       User Key  (r) = Recorded By, (t) = Taken By, (c) = Cosigned By    Initials Name Provider Type    Radha Kohler, PT Physical Therapist        Outcome Measures     Row Name 01/15/20 1102          How much help from another person do you currently need...    Turning from your back to your side while in flat bed without using  bedrails?  4  -EM     Moving from lying on back to sitting on the side of a flat bed without bedrails?  4  -EM     Moving to and from a bed to a chair (including a wheelchair)?  3  -EM     Standing up from a chair using your arms (e.g., wheelchair, bedside chair)?  3  -EM     Climbing 3-5 steps with a railing?  3  -EM     To walk in hospital room?  3  -EM     AM-PAC 6 Clicks Score (PT)  20  -EM     Row Name 01/15/20 1102          Functional Assessment    Outcome Measure Options  AM-PAC 6 Clicks Basic Mobility (PT)  -EM       User Key  (r) = Recorded By, (t) = Taken By, (c) = Cosigned By    Initials Name Provider Type    Radha Kohler, PT Physical Therapist          PT Recommendation and Plan  Planned Therapy Interventions (PT Eval): gait training, home exercise program  Outcome Summary/Treatment Plan (PT)  Anticipated Discharge Disposition (PT): home with assist  Plan of Care Reviewed With: patient, spouse  Outcome Summary: Patient is a 69 y.o. male admitted to [facility] for COPD, asthma, CHF on 1/14/2020. PMHx includes antiphospholipid syndrome, DVT, esophageal cancer. Patient is independent at baseline and lives with his spouse. Patient reports no stairs to enter home, uses a cane at times. Today, patient performed bed mobility independently, independent for transfers, and ambulated 200 feet with rwx and supervision.  Patient demonstrates impairments consisting of generalized weakness, decreased activity tolerance. Patient may benefit from skilled PT services acutely to address functional deficits as well as improve level of independence prior to discharge. Anticipate home with assist upon DC. Patient encouraged to ambulate with [facility] staff and/or family to maintain mobility status while hospitalized.     Time Calculation:   PT Charges     Row Name 01/15/20 1106             Time Calculation    Start Time  1030  -EM      Stop Time  1051  -EM      Time Calculation (min)  21 min  -EM      PT Received On  01/15/20   -EM      PT - Next Appointment  01/17/20  -EM      PT Goal Re-Cert Due Date  01/22/20  -EM         Time Calculation- PT    Total Timed Code Minutes- PT  10 minute(s)  -EM        User Key  (r) = Recorded By, (t) = Taken By, (c) = Cosigned By    Initials Name Provider Type    EM Radha Couch, PT Physical Therapist        Therapy Charges for Today     Code Description Service Date Service Provider Modifiers Qty    36326532061 HC PT EVAL MOD COMPLEXITY 2 1/15/2020 Radha Couch, PT GP 1    90085299458 HC PT THER PROC EA 15 MIN 1/15/2020 Radha Couch, PT GP 1          PT G-Codes  Outcome Measure Options: AM-PAC 6 Clicks Basic Mobility (PT)  AM-PAC 6 Clicks Score (PT): 20    Radha Couch PT  1/15/2020

## 2020-01-15 NOTE — ED NOTES
/  Nursing report ED to floor  Jose Hurtado  69 y.o.  male    HPI (triage note):   Chief Complaint   Patient presents with   • Shortness of Breath   • Cough       Admitting doctor:   Jose Osborne MD    Admitting diagnosis:   The primary encounter diagnosis was Shortness of breath. Diagnoses of Hypoxia, Pleural effusion on right, Bilateral leg edema, and B12 deficiency were also pertinent to this visit.    Code status:   Current Code Status     Date Active Code Status Order ID Comments User Context       1/14/2020 1904 CPR [id number]  Jose Osborne MD ED       Questions for Current Code Status     Question Answer Comment    Code Status CPR     Medical Interventions (Level of Support Prior to Arrest) Full           Allergies:   Patient has no known allergies.    Weight:       01/14/20  1443   Weight: 93.6 kg (206 lb 6.4 oz)       Most recent vitals:   Vitals:    01/14/20 1716 01/14/20 1733 01/14/20 1834 01/14/20 1855   BP: 156/69 122/58 138/84 122/58   BP Location: Left arm  Left arm Left arm   Patient Position: Sitting  Sitting Sitting   Pulse: 92 89 84 78   Resp: 18   17   Temp:       TempSrc:       SpO2: 98%  98% 98%   Weight:       Height:           Active LDAs/IV Access:   Lines, Drains & Airways    Active LDAs     Name:   Placement date:   Placement time:   Site:   Days:    Peripheral IV 01/14/20 1502 Left Antecubital   01/14/20    1502    Antecubital   less than 1    Peripheral IV 01/14/20 1502 Right Antecubital   01/14/20    1502    Antecubital   less than 1                Labs (abnormal labs have a star):   Labs Reviewed   COMPREHENSIVE METABOLIC PANEL - Abnormal; Notable for the following components:       Result Value    Albumin 3.40 (*)     All other components within normal limits    Narrative:     GFR Normal >60  Chronic Kidney Disease <60  Kidney Failure <15     TROPONIN (IN-HOUSE) - Abnormal; Notable for the following components:    Troponin T 0.033 (*)     All other components within normal limits     Narrative:     Troponin T Reference Range:  <= 0.03 ng/mL-   Negative for AMI  >0.03 ng/mL-     Abnormal for myocardial necrosis.  Clinicians would have to utilize clinical acumen, EKG, Troponin and serial changes to determine if it is an Acute Myocardial Infarction or myocardial injury due to an underlying chronic condition.       Results may be falsely decreased if patient taking Biotin.     PROTIME-INR - Abnormal; Notable for the following components:    Protime 20.2 (*)     INR 1.76 (*)     All other components within normal limits   CBC WITH AUTO DIFFERENTIAL - Abnormal; Notable for the following components:    RBC 3.46 (*)     Hemoglobin 10.2 (*)     Hematocrit 30.9 (*)     Neutrophil % 86.8 (*)     Lymphocyte % 7.8 (*)     Monocyte % 4.4 (*)     Eosinophil % 0.2 (*)     Lymphocytes, Absolute 0.44 (*)     All other components within normal limits   TROPONIN (IN-HOUSE) - Abnormal; Notable for the following components:    Troponin T 0.037 (*)     All other components within normal limits    Narrative:     Troponin T Reference Range:  <= 0.03 ng/mL-   Negative for AMI  >0.03 ng/mL-     Abnormal for myocardial necrosis.  Clinicians would have to utilize clinical acumen, EKG, Troponin and serial changes to determine if it is an Acute Myocardial Infarction or myocardial injury due to an underlying chronic condition.       Results may be falsely decreased if patient taking Biotin.     RESPIRATORY PANEL, PCR - Normal   BNP (IN-HOUSE) - Normal    Narrative:     Among patients with dyspnea, NT-proBNP is highly sensitive for the detection of acute congestive heart failure. In addition NT-proBNP of <300 pg/ml effectively rules out acute congestive heart failure with 99% negative predictive value.    Results may be falsely decreased if patient taking Biotin.     PROCALCITONIN - Normal    Narrative:     As a Marker for Sepsis (Non-Neonates):   1. <0.5 ng/mL represents a low risk of severe sepsis and/or septic shock.  1. >2  "ng/mL represents a high risk of severe sepsis and/or septic shock.    As a Marker for Lower Respiratory Tract Infections that require antibiotic therapy:  PCT on Admission     Antibiotic Therapy             6-12 Hrs later  > 0.5                Strongly Recommended            >0.25 - <0.5         Recommended  0.1 - 0.25           Discouraged                   Remeasure/reassess PCT  <0.1                 Strongly Discouraged          Remeasure/reassess PCT      As 28 day mortality risk marker: \"Change in Procalcitonin Result\" (> 80 % or <=80 %) if Day 0 (or Day 1) and Day 4 values are available. Refer to http://www.[place]-calculator.com/   Change in PCT <=80 %   A decrease of PCT levels below or equal to 80 % defines a positive change in PCT test result representing a higher risk for 28-day all-cause mortality of patients diagnosed with severe sepsis or septic shock.  Change in PCT > 80 %   A decrease of PCT levels of more than 80 % defines a negative change in PCT result representing a lower risk for 28-day all-cause mortality of patients diagnosed with severe sepsis or septic shock.                Results may be falsely decreased if patient taking Biotin.    LACTIC ACID, PLASMA - Normal   BLOOD CULTURE   BLOOD CULTURE   RAINBOW DRAW    Narrative:     The following orders were created for panel order [place] Draw.  Procedure                               Abnormality         Status                     ---------                               -----------         ------                     Light Blue Top[[id number]]                                   Final result               Green Top (Gel)[[id number]]                                  Final result               Lavender Top[[id number]]                                     Final result               Gold Top - SST[[id number]]                                   Final result                 Please view results for these tests on the individual orders.   CBC AND DIFFERENTIAL "    Narrative:     The following orders were created for panel order CBC & Differential.  Procedure                               Abnormality         Status                     ---------                               -----------         ------                     CBC Auto Differential[[id number]]        Abnormal            Final result                 Please view results for these tests on the individual orders.   LIGHT BLUE TOP   GREEN TOP   LAVENDER TOP   GOLD TOP - SST       EKG:   ECG 12 Lead   Final Result   HEART RATE= 90  bpm   RR Interval= 668  ms   RI Interval= 171  ms   P Horizontal Axis= 73  deg   P Front Axis= -18  deg   QRSD Interval= 97  ms   QT Interval= 362  ms   QRS Axis= 12  deg   T Wave Axis= 54  deg   - OTHERWISE NORMAL ECG -   Sinus rhythm   Minimal ST depression, inferior leads   No change from prior tracing   Electronically Signed By: Jose ChanelCONCEPCIÓN) ([facility]) 14-Jan-2020 19:22:49   Date and Time of Study: 2020-01-14 14:52:21          Meds given in ED:   Medications   sodium chloride 0.9 % flush 10 mL (10 mL Intravenous Given 1/14/20 1503)   ipratropium-albuterol (DUO-NEB) nebulizer solution 3 mL (3 mL Nebulization Given 1/14/20 1458)   albuterol (PROVENTIL) nebulizer solution 0.083% 2.5 mg/3mL (2.5 mg Nebulization Given 1/14/20 1458)   methylPREDNISolone sodium succinate (SOLU-Medrol) injection 125 mg (125 mg Intravenous Given 1/14/20 1717)   furosemide (LASIX) injection 20 mg (20 mg Intravenous Given 1/14/20 1733)       Imaging results:  Xr Chest 2 View    Result Date: 1/14/2020  Mild to moderate right pleural effusion and right basilar atelectasis or infiltrate, follow-up recommended. Tortuous aorta.  This report was finalized on 1/14/2020 3:54 PM by Dr. Sumanth Boyer M.D.        Ambulatory status:   - up with assist    Social issues:   Social History     Socioeconomic History   • Marital status:      Spouse name: Lena   • Number of children: 0   • Years of education: College    • Highest education level: Not on file   Occupational History   • Occupation: Retired      Comment: Retired    Tobacco Use   • Smoking status: Former Smoker     Packs/day: 2.00     Years: 3.00     Pack years: 6.00     Types: Cigarettes     Last attempt to quit: 1974     Years since quittin.0   • Smokeless tobacco: Never Used   Substance and Sexual Activity   • Alcohol use: Yes     Frequency: 2-3 times a week     Comment: 2-3 x per week   • Drug use: No   • Sexual activity: Defer   Social History Narrative    self-employed         Maria Del Carmen Espinoza RN  20

## 2020-01-16 VITALS
HEART RATE: 78 BPM | OXYGEN SATURATION: 94 % | TEMPERATURE: 97.6 F | SYSTOLIC BLOOD PRESSURE: 150 MMHG | DIASTOLIC BLOOD PRESSURE: 66 MMHG | HEIGHT: 74 IN | WEIGHT: 189 LBS | RESPIRATION RATE: 18 BRPM | BODY MASS INDEX: 24.26 KG/M2

## 2020-01-16 LAB
ANION GAP SERPL CALCULATED.3IONS-SCNC: 14 MMOL/L (ref 5–15)
BASOPHILS # BLD AUTO: 0.02 10*3/MM3 (ref 0–0.2)
BASOPHILS NFR BLD AUTO: 0.4 % (ref 0–1.5)
BUN BLD-MCNC: 26 MG/DL (ref 8–23)
BUN/CREAT SERPL: 25 (ref 7–25)
CALCIUM SPEC-SCNC: 8.7 MG/DL (ref 8.6–10.5)
CHLORIDE SERPL-SCNC: 102 MMOL/L (ref 98–107)
CO2 SERPL-SCNC: 25 MMOL/L (ref 22–29)
CREAT BLD-MCNC: 1.04 MG/DL (ref 0.76–1.27)
DEPRECATED RDW RBC AUTO: 45.3 FL (ref 37–54)
EOSINOPHIL # BLD AUTO: 0.06 10*3/MM3 (ref 0–0.4)
EOSINOPHIL NFR BLD AUTO: 1.2 % (ref 0.3–6.2)
ERYTHROCYTE [DISTWIDTH] IN BLOOD BY AUTOMATED COUNT: 14.1 % (ref 12.3–15.4)
GFR SERPL CREATININE-BSD FRML MDRD: 70 ML/MIN/1.73
GLUCOSE BLD-MCNC: 125 MG/DL (ref 65–99)
GLUCOSE BLDC GLUCOMTR-MCNC: 109 MG/DL (ref 70–130)
GLUCOSE BLDC GLUCOMTR-MCNC: 117 MG/DL (ref 70–130)
GLUCOSE BLDC GLUCOMTR-MCNC: 135 MG/DL (ref 70–130)
HCT VFR BLD AUTO: 29.9 % (ref 37.5–51)
HGB BLD-MCNC: 9.9 G/DL (ref 13–17.7)
IMM GRANULOCYTES # BLD AUTO: 0.01 10*3/MM3 (ref 0–0.05)
IMM GRANULOCYTES NFR BLD AUTO: 0.2 % (ref 0–0.5)
INR PPP: 2.91 (ref 0.9–1.1)
LYMPHOCYTES # BLD AUTO: 0.79 10*3/MM3 (ref 0.7–3.1)
LYMPHOCYTES NFR BLD AUTO: 15.2 % (ref 19.6–45.3)
MCH RBC QN AUTO: 29.5 PG (ref 26.6–33)
MCHC RBC AUTO-ENTMCNC: 33.1 G/DL (ref 31.5–35.7)
MCV RBC AUTO: 89 FL (ref 79–97)
MONOCYTES # BLD AUTO: 0.26 10*3/MM3 (ref 0.1–0.9)
MONOCYTES NFR BLD AUTO: 5 % (ref 5–12)
NEUTROPHILS # BLD AUTO: 4.07 10*3/MM3 (ref 1.7–7)
NEUTROPHILS NFR BLD AUTO: 78 % (ref 42.7–76)
NRBC BLD AUTO-RTO: 0 /100 WBC (ref 0–0.2)
PLATELET # BLD AUTO: 194 10*3/MM3 (ref 140–450)
PMV BLD AUTO: 9.4 FL (ref 6–12)
POTASSIUM BLD-SCNC: 3.6 MMOL/L (ref 3.5–5.2)
PROTHROMBIN TIME: 30.1 SECONDS (ref 11.7–14.2)
RBC # BLD AUTO: 3.36 10*6/MM3 (ref 4.14–5.8)
SODIUM BLD-SCNC: 141 MMOL/L (ref 136–145)
WBC NRBC COR # BLD: 5.21 10*3/MM3 (ref 3.4–10.8)

## 2020-01-16 PROCEDURE — 85025 COMPLETE CBC W/AUTO DIFF WBC: CPT | Performed by: INTERNAL MEDICINE

## 2020-01-16 PROCEDURE — 82962 GLUCOSE BLOOD TEST: CPT

## 2020-01-16 PROCEDURE — 96376 TX/PRO/DX INJ SAME DRUG ADON: CPT

## 2020-01-16 PROCEDURE — 94799 UNLISTED PULMONARY SVC/PX: CPT

## 2020-01-16 PROCEDURE — 80048 BASIC METABOLIC PNL TOTAL CA: CPT | Performed by: INTERNAL MEDICINE

## 2020-01-16 PROCEDURE — 99213 OFFICE O/P EST LOW 20 MIN: CPT | Performed by: INTERNAL MEDICINE

## 2020-01-16 PROCEDURE — 85610 PROTHROMBIN TIME: CPT | Performed by: INTERNAL MEDICINE

## 2020-01-16 PROCEDURE — G0378 HOSPITAL OBSERVATION PER HR: HCPCS

## 2020-01-16 PROCEDURE — 25010000002 FUROSEMIDE PER 20 MG: Performed by: INTERNAL MEDICINE

## 2020-01-16 RX ADMIN — PAROXETINE HYDROCHLORIDE 30 MG: 30 TABLET, FILM COATED ORAL at 08:20

## 2020-01-16 RX ADMIN — FERROUS SULFATE TAB 325 MG (65 MG ELEMENTAL FE) 325 MG: 325 (65 FE) TAB at 08:20

## 2020-01-16 RX ADMIN — FUROSEMIDE 20 MG: 20 INJECTION, SOLUTION INTRAMUSCULAR; INTRAVENOUS at 08:20

## 2020-01-16 RX ADMIN — SODIUM CHLORIDE, PRESERVATIVE FREE 10 ML: 5 INJECTION INTRAVENOUS at 08:21

## 2020-01-16 RX ADMIN — IPRATROPIUM BROMIDE AND ALBUTEROL SULFATE 3 ML: 2.5; .5 SOLUTION RESPIRATORY (INHALATION) at 07:13

## 2020-01-16 RX ADMIN — IPRATROPIUM BROMIDE AND ALBUTEROL SULFATE 3 ML: 2.5; .5 SOLUTION RESPIRATORY (INHALATION) at 10:57

## 2020-01-16 RX ADMIN — METOPROLOL TARTRATE 25 MG: 25 TABLET ORAL at 13:04

## 2020-01-16 RX ADMIN — PRAMIPEXOLE DIHYDROCHLORIDE 0.75 MG: 0.5 TABLET ORAL at 08:20

## 2020-01-16 NOTE — PLAN OF CARE
Lungs diminsihed with fine crackles in left lower base. Bilateral leg edema is still moderate on right leg and severe on left leg but less edematous than previous night. Pt ambulated this shift. VSS, on RA, safety maintained, will continue to monitor.

## 2020-01-16 NOTE — PROGRESS NOTES
Patient Name: Jose Hurtado  Patient : 1950        Date of Service:20  Provider of Service: James yCr MD  Place of Service: [facility]  Referral Provider: Jose Osborne MD          Follow Up: Shortness of breath, elevated troponin.  Interval Hx: Patient feeling much better.  Echocardiogram shows hyperdynamic left ventricular function without evidence of wall motion abnormality.  CT scan negative for pulmonary embolus.        OBJECTIVE  Temp:  [97.5 °F (36.4 °C)-98 °F (36.7 °C)] 97.6 °F (36.4 °C)  Heart Rate:  [61-90] 78  Resp:  [16-20] 16  BP: (104-150)/(54-66) 150/66     Intake/Output Summary (Last 24 hours) at 2020 0844  Last data filed at 1/15/2020 2350  Gross per 24 hour   Intake 960 ml   Output --   Net 960 ml     Body mass index is 24.27 kg/m².      20  1443 01/15/20  0746 20  0520   Weight: 93.6 kg (206 lb 6.4 oz) 93.4 kg (206 lb) 85.7 kg (189 lb)         Physical Exam:   Physical Exam   Constitutional: He is oriented to person, place, and time. He appears well-developed and well-nourished.   HENT:   Head: Normocephalic.   Eyes: Pupils are equal, round, and reactive to light.   Neck: Normal range of motion. No JVD present. Carotid bruit is not present. No thyromegaly present.   Cardiovascular: Normal rate, regular rhythm, S1 normal, S2 normal, normal heart sounds and intact distal pulses. Exam reveals no gallop and no friction rub.   No murmur heard.  Pulmonary/Chest: Effort normal and breath sounds normal.   Abdominal: Soft. Bowel sounds are normal.   Musculoskeletal: He exhibits no edema.   Neurological: He is alert and oriented to person, place, and time.   Skin: Skin is warm, dry and intact. No erythema.   Psychiatric: He has a normal mood and affect.   Vitals reviewed.        CURRENT MEDS    Scheduled Meds:  [START ON 2020] cyanocobalamin 1,000 mcg Intramuscular Q28 Days   enoxaparin 1 mg/kg Subcutaneous Q12H   ferrous sulfate 325 mg  Oral Daily With Breakfast   furosemide 20 mg Intravenous BID   insulin lispro 0-9 Units Subcutaneous 4x Daily With Meals & Nightly   ipratropium-albuterol 3 mL Nebulization 4x Daily - RT   PARoxetine 30 mg Oral Daily   pramipexole 0.75 mg Oral BID   sodium chloride 10 mL Intravenous Q12H   tamsulosin 0.4 mg Oral Nightly   traZODone 100 mg Oral Nightly     Continuous Infusions:  Pharmacy to dose warfarin          Lab Review:   Results from last 7 days   Lab Units 01/16/20  0242 01/15/20  0442 01/14/20  1500   SODIUM mmol/L 141 139 138   POTASSIUM mmol/L 3.6 4.0 4.4   CHLORIDE mmol/L 102 100 101   CO2 mmol/L 25.0 23.4 25.2   BUN mg/dL 26* 23 20   CREATININE mg/dL 1.04 1.10 1.18   GLUCOSE mg/dL 125* 252* 99   CALCIUM mg/dL 8.7 8.5* 8.6   AST (SGOT) U/L  --  11 14   ALT (SGPT) U/L  --  11 9     Results from last 7 days   Lab Units 01/15/20  1133 01/14/20  1720 01/14/20  1500   TROPONIN T ng/mL 0.035* 0.037* 0.033*     Results from last 7 days   Lab Units 01/16/20  0242 01/15/20  0442   WBC 10*3/mm3 5.21 5.57   HEMOGLOBIN g/dL 9.9* 8.8*   HEMATOCRIT % 29.9* 27.5*   PLATELETS 10*3/mm3 194 172     Results from last 7 days   Lab Units 01/16/20  0242 01/15/20  0442   INR  2.91* 1.92*             Results from last 7 days   Lab Units 01/14/20  1500   PROBNP pg/mL 551.4           I personally reviewed the patient's ECG and telemetry data    ASSESSMENT & PLAN    Shortness of breath    Adenocarcinoma of esophagus (CMS/HCC)    Malignant neoplasm of prostate (CMS/HCC)    Hypertension    Anti-phospholipid syndrome (CMS/HCC)    Long-term (current) use of anticoagulants, INR goal 2.0-3.0    Lymphedema    1.  Shortness of breath: Multifactorial.  It appears that his dyspnea may be related to pleural effusion.  2.  Elevated troponin: No evidence of acute coronary syndrome at this time.  3.  Hyperdynamic left ventricular function: Suggest adding beta-blocker  4.  Hypertension: Add beta-blocker  5.  Anemia  6.  Adenocarcinoma of the  esophagus  7.  Antiphospholipid syndrome: On anticoagulation.    We will place the patient on metoprolol tartrate twice daily to control his blood pressure and hopefully decrease his left ventricular contractility.      James Cyr MD  01/16/20

## 2020-01-16 NOTE — DISCHARGE SUMMARY
[facility]    Date of Discharge:  1/16/2020    PCP: Brandin Bolton MD    Discharge Diagnosis:   Active Hospital Problems    Diagnosis  POA   • **Shortness of breath [R06.02]  Yes   • Long-term (current) use of anticoagulants, INR goal 2.0-3.0 [Z79.01]  Not Applicable   • Lymphedema [I89.0]  Unknown   • Anti-phospholipid syndrome (CMS/HCC) [D68.61]  Yes   • Hypertension [I10]  Yes   • Malignant neoplasm of prostate (CMS/HCC) [C61]  Yes   • Adenocarcinoma of esophagus (CMS/HCC) [C15.9]  Yes      Resolved Hospital Problems   No resolved problems to display.     Procedures Performed       Consults     Date and Time Order Name Status Description    1/14/2020 1011 Inpatient Pulmonology Consult Completed     1/14/2020 7274 Inpatient Cardiology Consult Completed     1/14/2020 1701 [facility] (on-call MD unless specified) Details Completed         Hospital Course  This is a 69-year-old male who was admitted to the hospital on 1/14/2020 due to progressive shortness of breath.  Patient does have history of hypercoagulable state due to antiphospholipid antibody syndrome.  Patient's INR is in the therapeutic range.  Patient's last echo done in April 2019 showed ejection fraction of 54%.  Patient was started on IV Lasix here in the hospital, his shortness of breath improved.  Patient has been evaluated by cardiology service.  Patient has been started on beta-blocker therapy, clinically he is much more improved.  His shortness of breath is improved, he is currently on room air.  Patient is ambulating in the hallways.  He will closely follow-up with pulmonary and cardiology on outpatient basis.  I have seen and examined patient by the bedside today on the day of discharge and total time spent is more than 30 minutes.  Plan of care was discussed with the patient and he has verbalized understanding.    Patient is status post chemotherapy and surgery for cancer of the esophagus in the  past.  Patient also has history of DVT with hypercoagulable state, he is on long-term Coumadin therapy.  Patient's primary care physician will also follow-up on INR.    Condition on Discharge: Improved.     Temp:  [97.5 °F (36.4 °C)-98 °F (36.7 °C)] 97.6 °F (36.4 °C)  Heart Rate:  [67-90] 78  Resp:  [16-20] 18  BP: (104-150)/(54-66) 150/66  Body mass index is 24.27 kg/m².    Physical Exam   General Appearance:    Alert, cooperative, no distress, appears stated age   Head:    Normocephalic, without obvious abnormality, atraumatic   Eyes:    PERRL, conjunctiva/corneas clear, EOM's intact, both eyes   Ears:    Normal external ear canals, both ears   Nose:   Nares normal, septum midline, mucosa normal, no drainage    or sinus tenderness   Throat:   Lips, tongue, gums normal; oral mucosa pink and moist   Neck:   Supple, symmetrical, trachea midline, no adenopathy;     thyroid:  no enlargement/tenderness/nodules; no carotid    bruit or JVD   Back:     Symmetric, no curvature, ROM normal, no CVA tenderness   Lungs:    Decreased breath sounds at the bases   Chest Wall:    No tenderness or deformity    Heart:    Regular rate and rhythm, S1 and S2 normal, no murmur, rub   or gallop   Abdomen:     Soft, non-tender, bowel sounds active all four quadrants,    Extremities:  Chronic bilateral lower extremity edema   Pulses:   Pulses palpable in all extremities; symmetric all extremities   Skin:   Skin color normal, Skin is warm and dry,  no rashes or palpable lesions   Neurologic:  Grossly nonfocal              Discharge Medications      New Medications      Instructions Start Date   metoprolol tartrate 25 MG tablet  Commonly known as:  LOPRESSOR   25 mg, Oral, Every 12 Hours Scheduled         Changes to Medications      Instructions Start Date   ferrous sulfate 325 (65 FE) MG tablet  What changed:    · how much to take  · how to take this  · when to take this   TAKE ONE TABLET BY MOUTH DAILY WITH BREAKFAST      warfarin 5 MG  tablet  Commonly known as:  COUMADIN  What changed:  additional instructions   5 mg, Oral, Daily         Continue These Medications      Instructions Start Date   PARoxetine 30 MG tablet  Commonly known as:  PAXIL   30 mg, Oral, Every Morning      pramipexole 1.5 MG tablet  Commonly known as:  MIRAPEX   1.5 mg, Oral, 2 Times Daily      tamsulosin 0.4 MG capsule 24 hr capsule  Commonly known as:  FLOMAX   1 capsule, Oral, Nightly      traZODone 100 MG tablet  Commonly known as:  DESYREL   100 mg, Oral, Nightly              Additional Instructions for the Follow-ups that You Need to Schedule     Discharge Follow-up with PCP   As directed       Currently Documented PCP:    Brandin Bolton MD    PCP Phone Number:    [phone]     Follow Up Details:  Follow-up with PCP in 7 days.         Discharge Follow-up with Specialty: Follow-up with pulmonary in 2 weeks.; 2 Weeks   As directed      Specialty:  Follow-up with pulmonary in 2 weeks.    Follow Up:  2 Weeks         Discharge Follow-up with Specified Provider: Follow-up with cardiology in 2 weeks.; 2 Weeks   As directed      To:  Follow-up with cardiology in 2 weeks.    Follow Up:  2 Weeks           Follow-up Information     Brandin Bolton MD .    Specialty:  Internal Medicine  Why:  Follow-up with PCP in 7 days.  Contact information:  [address] [address]  [phone]                 Test Results Pending at Discharge   Order Current Status    Blood Culture - Blood, Arm, Right Preliminary result    Blood Culture - Blood, Arm, Right Preliminary result         Bill Gallo MD  01/16/20  12:37 PM    Discharge time spent greater than 30 minutes.

## 2020-01-16 NOTE — PROGRESS NOTES
Pharmacy Consult: Warfarin Dosing/ Monitoring    Jose Hurtado is a 69 y.o. male, estimated creatinine clearance is 79 mL/min (by C-G formula based on SCr of 1.04 mg/dL). weighing 85.7 kg (189 lb).     has a past medical history of Anemia, Anti-phospholipid syndrome (CMS/HCC), Bladder disorder, Community acquired pneumonia, Deep venous thrombosis (CMS/HCC) (, ), Depression, Esophageal carcinoma (CMS/HCC) (2014), Hemorrhoids, HH (hiatus hernia), History of atrial fibrillation (), History of kidney stones, History of nephrolithiasis, History of pancreatitis, History of radiation therapy, Hypertension, Long-term (current) use of anticoagulants, INR goal 2.0-3.0, Malignant neoplasm of prostate (CMS/HCC), Other hyperlipidemia (2018), Restless legs syndrome, and Squamous carcinoma.    Social History     Tobacco Use    Smoking status: Former Smoker     Packs/day: 2.00     Years: 3.00     Pack years: 6.00     Types: Cigarettes     Last attempt to quit: 1974     Years since quittin.0    Smokeless tobacco: Never Used   Substance Use Topics    Alcohol use: Yes     Frequency: 2-3 times a week     Comment: 2-3 x per week    Drug use: No       Results from last 7 days   Lab Units 01/16/20  0242 01/15/20  0442 01/14/20  2336 20  1500   INR  2.91* 1.92* 1.72* 1.76*   HEMOGLOBIN g/dL 9.9* 8.8*  --  10.2*   HEMATOCRIT % 29.9* 27.5*  --  30.9*   PLATELETS 10*3/mm3 194 172  --  185     Results from last 7 days   Lab Units 20  0242 01/15/20  0442 01/14/20  1500   SODIUM mmol/L 141 139 138   POTASSIUM mmol/L 3.6 4.0 4.4   CHLORIDE mmol/L 102 100 101   CO2 mmol/L 25.0 23.4 25.2   BUN mg/dL 26* 23 20   CREATININE mg/dL 1.04 1.10 1.18   CALCIUM mg/dL 8.7 8.5* 8.6   BILIRUBIN mg/dL  --  0.2 0.4   ALK PHOS U/L  --  93 106   ALT (SGPT) U/L  --  11 9   AST (SGOT) U/L  --  11 14   GLUCOSE mg/dL 125* 252* 99     Anticoagulation history: On 20 patient was instructed to take 2.5mg qod as INR was 4.8 at  the time. Did show a history of 2.5mg on a, 5mg [facility] Anticoagulation:  Consulting provider: Dr. Osborne  Start date: prior to admission but was on hold  Indication: DVT/PE active thrombosis  Target INR: 2-3  Expected duration: indefinite  Bridge Therapy: yes (90mg q12h) d/c once INR is therapeutic                Date 1/3/20 1/7/204 1/14/20 1/15/20 1/16/20        INR 8 4.8 1.76 1.92 2.91        Warfarin dose held held  5mg @  0132  * hold        *and 5mg given @ 1844 1/15/20    Potential drug interactions:Concurrent use of PAROXETINE and ANTICOAGULANTS may result in an increased risk of bleeding. Concurrent use of TRAZODONE and ANTIPLATELETS, ANTICOAGULANTS, OR NSAIDS may result in increased risk of bleeding    Relevant nutrition status: regular cardiac diet    Education complete? Yes / Date: 1/16/20    Assessment/Plan:  Dose: As expected INR increased from the 5mg dose given last night at 1844 but is at upper end of target at 2.91. Therefore will d/c the lovenox per Dr. Osborne's order to d/c lovenox once INR is therapeutic but will hold warfarin dosing for today.   Monitor INR daily  Follow up tomorrow    Pharmacy will continue to follow until discharge or discontinuation of warfarin.   George Loza, BROWN  1/16/2020

## 2020-01-17 ENCOUNTER — OFFICE VISIT (OUTPATIENT)
Dept: INTERNAL MEDICINE | Age: 70
End: 2020-01-17

## 2020-01-17 VITALS
WEIGHT: 195 LBS | HEART RATE: 74 BPM | DIASTOLIC BLOOD PRESSURE: 58 MMHG | TEMPERATURE: 97.1 F | SYSTOLIC BLOOD PRESSURE: 100 MMHG | HEIGHT: 74 IN | BODY MASS INDEX: 25.03 KG/M2 | OXYGEN SATURATION: 99 %

## 2020-01-17 DIAGNOSIS — R06.02 SHORTNESS OF BREATH: Primary | ICD-10-CM

## 2020-01-17 DIAGNOSIS — D64.9 ANEMIA, UNSPECIFIED TYPE: ICD-10-CM

## 2020-01-17 DIAGNOSIS — I10 ESSENTIAL HYPERTENSION: Chronic | ICD-10-CM

## 2020-01-17 DIAGNOSIS — R73.9 HYPERGLYCEMIA: ICD-10-CM

## 2020-01-17 LAB
HBA1C MFR BLD: 6.2 % (ref 4.8–5.6)
T4 FREE SERPL-MCNC: 1.52 NG/DL (ref 0.93–1.7)
TSH SERPL DL<=0.005 MIU/L-ACNC: 2.71 UIU/ML (ref 0.27–4.2)

## 2020-01-17 PROCEDURE — 99214 OFFICE O/P EST MOD 30 MIN: CPT | Performed by: INTERNAL MEDICINE

## 2020-01-17 NOTE — PROGRESS NOTES
Case Management Discharge Note      Final Note: Home with family              Transportation Services  Private: Car    Final Discharge Disposition Code: 01 - home or self-care

## 2020-01-17 NOTE — PROGRESS NOTES
"[facility] [place]  RICHARDSON BOLTON M.D.      Jose LITTLE Hurtado / 69 y.o. / male  01/17/2020    VITALS    Visit Vitals  /58   Pulse 74   Temp 97.1 °F (36.2 °C)   Ht 188 cm (74\")   Wt 88.5 kg (195 lb)   SpO2 99%   BMI 25.04 kg/m²       BP Readings from Last 3 Encounters:   01/17/20 100/58   01/16/20 150/66   01/07/20 142/70     Wt Readings from Last 3 Encounters:   01/17/20 88.5 kg (195 lb)   01/16/20 85.7 kg (189 lb)   01/07/20 90.3 kg (199 lb 1.2 oz)      Body mass index is 25.04 kg/m².    CC:  Main reason(s) for today's visit: hospital f/u (1/14/20-1/16/20 SOB, HYPOXIA)      HPI:     Date of emergency department encounter: 1/14/20-1/16/20  Emergency department: [facility]  Principle Dx: Dyspnea  Secondary Dx:   History prior to ED visit: Increasing cough and shortness of breath. Reports having had sputum without fever or chills. Known history of APS on warfarin. No history of heart failure. + history of anemia followed by hematologist. No melena or recent bleeding. No chest pain or palpitations.   Evaluation/Treatment: CT was negative for PE, small increased right pleural effusion. ? Mild atelectasis or pneumonia LLL. Troponin mildly elevated. Acute drop in hemoglobin to 8.8 (baseline > 10).   Course: Denies worsening cough, shortness of breath, swelling. No fever or chills. No melena or bleeding. Denies chest pain or palpitations.       Patient Care Team:  Brandin Bolton MD as PCP - General (Internal Medicine)  Tapan, George THORPE II, MD as Consulting Physician (Hematology and Oncology)  Jose Inman MD as Consulting Physician (Urology)  Mulugeta Millan MD as Consulting Physician (Gastroenterology)  Seth Randhawa MD as Consulting Physician (Ophthalmology)  ____________________________________________________________________    ASSESSMENT & PLAN:    1. Shortness of breath    2. Hyperglycemia    3. Anemia, unspecified type    4. Essential hypertension      Orders Placed This Encounter "   Procedures   • Hemoglobin A1c   • TSH+Free T4     New Medications Ordered This Visit   Medications   • metoprolol tartrate (LOPRESSOR) 25 MG tablet     Sig: Take 0.5 tablets by mouth Every 12 (Twelve) Hours.     Dispense:  60 tablet     Refill:  0       Summary/Discussion:  · Decrease metoprolol tartrate to 1/2 of 25 mg BID  · Make appointment with cardiologist as directed, will need ischemic heart disease evaluation   · Discuss with Dr. Phelan re: anemia, will recheck HH on follow-up in 2 weeks (no clinical evidence of bleeding, Hgb was improving yesterday)  · Consider diuretic therapy if blood pressure allows     He was instructed to call or return if the condition is not improving or worsening. He expressed understanding and agreed to follow above instructions.      Return in about 2 weeks (around 1/31/2020) for Reassess today's problem(s).    Future Appointments   Date Time Provider Department Center   1/31/2020 10:45 AM Brandin Bolton MD [facility] None   2/7/2020  9:30 AM LAB CHAIR CBC LAB [facility]   2/7/2020 10:00 AM George Phelan II, MD [facility]   3/24/2020 10:00 AM Brandin Bolton MD [facility] None   5/15/2020  8:45 AM Stephon Sommers MD [facility] None   6/5/2020 10:30 AM LAB CHAIR CBC LAB [facility]   6/12/2020 10:00 AM VITALS ONLY CBC LAB [facility]   6/12/2020 10:20 AM George Phelan II, MD [facility]     ____________________________________________________________________    REVIEW OF SYSTEMS    Review of Systems  No fever or chills  No chest pain, PND/orthopnea, increased edema of legs  No worsening cough or shortness of breath  GI neg   neg    PHYSICAL EXAMINATION    Physical Exam  No acute distress, appears tired, mildly pale  Lungs clear to auscultation without wheezing or rales  Hearts sounds are normal   Chronic bilateral lower extremities with lymphedema (non-pitting)    REVIEWED DATA:    Labs:   Lab Results   Component Value Date     HGB 9.9 (L) 01/16/2020    HGB 8.8 (L) 01/15/2020    HGB 10.2 (L) 01/14/2020       Lab Results   Component Value Date     01/16/2020    K 3.6 01/16/2020    AST 11 01/15/2020    ALT 11 01/15/2020    BUN 26 (H) 01/16/2020    CREATININE 1.04 01/16/2020    CREATININE 1.10 01/15/2020    CREATININE 1.18 01/14/2020          Imaging:   Xr Chest 2 View    Result Date: 1/14/2020  Narrative: XR CHEST 2 VW-  HISTORY: Male who is 69 years-old, congestive heart failure  TECHNIQUE: Frontal and lateral views of the chest  COMPARISON: 04/18/2019  FINDINGS: Heart size is normal. Aorta is tortuous. Pulmonary vasculature is unremarkable. Mild to moderate right pleural effusion and right basilar atelectasis or infiltrate, follow-up recommended. No pneumothorax. Gastric pull-through changes are apparent. Chronic right rib deformity      Impression: Mild to moderate right pleural effusion and right basilar atelectasis or infiltrate, follow-up recommended. Tortuous aorta.  This report was finalized on 1/14/2020 3:54 PM by Dr. Sumanth Boyer M.D.      Ct Angiogram Chest With & Without Contrast    Result Date: 1/16/2020  Narrative: CT ANGIOGRAM OF THE CHEST WITH CONTRAST INCLUDING RECONSTRUCTION IMAGES 01/15/2020  HISTORY: Shortness of breath. Possible pulmonary embolus.  TECHNIQUE: Following the intravenous contrast injection CT angiography was performed through the chest. Sagittal, coronal and 3D reconstruction images were reviewed.  FINDINGS: The pulmonary arterial system is well opacified. There was no evidence of pulmonary embolus. Small amount of aortic calcification is seen. There has been a partial esophagectomy with a gastric pull-through procedure with intrathoracic stomach seen in the posterior right hemithorax. There is some adjacent atelectasis or scarring in the right lower lobe and this is also present on the 04/18/2019 study. There is a small right pleural effusion which has increased slightly in size from the  04/18/2019 study. Small amount of fluid tracks into the right major fissure.  No new lung masses are seen. Postoperative changes of right ribs is seen.  There is minimal patchy atelectasis or pneumonia in the left lung base.  No pathologically enlarged lymph nodes are seen. Lower cervical fusion plate is seen.      Impression: 1. No evidence of pulmonary embolus. 2. Scar or atelectasis in the right lower lobe. 3. Small right pleural effusion slightly increased in size from the 04/18/2019 study. 4. Mild atelectasis or developing pneumonia in the left lower lobe. 5. Status post esophagectomy with gastric pull-through procedure.  Radiation dose reduction techniques were utilized, including automated exposure control and exposure modulation based on body size.  This report was finalized on 1/16/2020 10:15 AM by Dr. Hever Traylor M.D.           Medical Tests:         Summary of old records / correspondence / consultant report:   DC summary re: issues addressed on HPI    Request outside records:       ALLERGIES  No Known Allergies     MEDICATIONS  Current Outpatient Medications   Medication Sig Dispense Refill   • ferrous sulfate 325 (65 FE) MG tablet TAKE ONE TABLET BY MOUTH DAILY WITH BREAKFAST (Patient taking differently: TAKE TWO TABLET BY MOUTH DAILY WITH BREAKFAST) 90 tablet 0   • metoprolol tartrate (LOPRESSOR) 25 MG tablet Take 0.5 tablets by mouth Every 12 (Twelve) Hours. 60 tablet 0   • PARoxetine (PAXIL) 30 MG tablet Take 1 tablet by mouth Every Morning. 90 tablet 1   • pramipexole (MIRAPEX) 1.5 MG tablet Take 1 tablet by mouth 2 (Two) Times a Day. 180 tablet 1   • tamsulosin (FLOMAX) 0.4 MG capsule 24 hr capsule Take 1 capsule by mouth Every Night.     • traZODone (DESYREL) 100 MG tablet Take 1 tablet by mouth Every Night for 180 days. 30 tablet 5   • warfarin (COUMADIN) 5 MG tablet Take 1 tablet by mouth Daily. (Patient taking differently: Take 5 mg by mouth Daily. 5 MG 5 DAYS A WEEK AND 2.5MG 2 DAYS A  WEEK) 90 tablet 2     Current Facility-Administered Medications   Medication Dose Route Frequency Provider Last Rate Last Dose   • cyanocobalamin injection 1,000 mcg  1,000 mcg Intramuscular Q28 Days Brandin Bolton MD   1,000 mcg at 12/27/19 1128       [facility]:     The following portions of the patient's history were reviewed and updated as appropriate: Allergies / Current Medications / Past Medical History / Surgical History / Social History / Family History    PROBLEM LIST   Patient Active Problem List   Diagnosis   • Adenocarcinoma of esophagus (CMS/HCC)   • Adenomatous polyp of colon   • Malignant neoplasm of prostate (CMS/HCC)   • RLS (restless legs syndrome)   • Depression   • Hypertension   • Anti-phospholipid syndrome (CMS/HCC)   • Anemia   • Insomnia due to other mental disorder   • Peripheral polyneuropathy   • B12 deficiency   • Postsurgical malabsorption   • Shortness of breath   • Long-term (current) use of anticoagulants, INR goal 2.0-3.0   • Lymphedema       PAST MEDICAL HISTORY  Past Medical History:   Diagnosis Date   • Anemia    • Anti-phospholipid syndrome (CMS/HCC)     On lifelong anticoagulation therapy   • Bladder disorder     LEAKAGE   ON MED  wers pads   • Community acquired pneumonia     HISTORY OF IN 2014   • Deep venous thrombosis (CMS/HCC) 2006, 2008    Left lower extremity multiple   • Depression    • Esophageal carcinoma (CMS/HCC) 12/31/2014    had chemo and radiation prior surgery   • Hemorrhoids    • HH (hiatus hernia)    • History of atrial fibrillation 2015    ONE EPISODE WHILE HOSPITALIZED   • History of kidney stones    • History of nephrolithiasis    • History of pancreatitis     PT STATES MANY YEARS AGO   • History of radiation therapy    • Hypertension    • Long-term (current) use of anticoagulants, INR goal 2.0-3.0    • Malignant neoplasm of prostate (CMS/HCC)    • Other hyperlipidemia 1/30/2018 January 30, 2018 lipid panel risk 12.8%   • Restless legs syndrome    • Squamous  carcinoma     on the head       SURGICAL HISTORY  Past Surgical History:   Procedure Laterality Date   • APPENDECTOMY  1950   • BRONCHOSCOPY      (Diagnostic)   • CATARACT EXTRACTION Bilateral 2014   • COLONOSCOPY  12/15/2014    Complete / Description: EH, IH, torts, stool, follow-up colonoscopy due in 5 years.   • COLONOSCOPY N/A 6/13/2017    non-thrombosed external hemorrhoids, normal examined ileum, IH   • COLONOSCOPY N/A 2/26/2019    Procedure: COLONOSCOPY with Cold Polypectomy;  Surgeon: Mulugeta Millan MD;  Location: [facility] ENDOSCOPY;  Service: Gastroenterology   • CYSTOSCOPY W/ LASER LITHOTRIPSY     • ENDOSCOPY N/A 6/13/2017    Procedure: ESOPHAGOGASTRODUODENOSCOPY WITH COLD BIOPSY;  Surgeon: Lake Gonzalez MD;  Location: [facility] ENDOSCOPY;  Service:    • ESOPHAGECTOMY      April 2015, stage IIB esophageal carcinoma, sub-total resection.   • ESOPHAGECTOMY      Esophagectomy Subtotal [place] Joe Procedure   • EXCISION LESION  08/2012    Removal of Squamous Cell CA on Head   • HAMMER TOE REPAIR  09/2014    Hammertoe Operation (Each Toe), 10/2014   • HAMMER TOE REPAIR Left 10/3/2017    Procedure: Left second third and fourth distal interphalangeal joint resection with flexor tenotomy;  Surgeon: Mulugeta Lira MD;  Location: [facility] OR OSC;  Service:    • HERNIA REPAIR      incisional   • JEJUNOSTOMY      Laparoscopic   • JEJUNOSTOMY      tube removal    • KNEE SURGERY Bilateral 1967, 1973, 1981   • PATELLA SURGERY Left     removed   • PILONIDAL CYST / SINUS EXCISION     • MT TOTAL KNEE ARTHROPLASTY Right 3/26/2018    Procedure: TOTAL KNEE ARTHROPLASTY;  Surgeon: Renny Solis MD;  Location: [facility] MAIN OR;  Service: Orthopedics   • PROSTATECTOMY  2010   • PYLOROPLASTY     • SPINAL FUSION  02/1998    C 5,6   • TONSILLECTOMY  1955   • TONSILLECTOMY     • UPPER GASTROINTESTINAL ENDOSCOPY  12/15/2014    LA Grade D esophagitis, Pardo's, HH, multiple duodenal ulcers   • VENTRAL/INCISIONAL HERNIA  REPAIR N/A 2016    Procedure: VENTRAL/INCISIONAL HERNIA REPAIR, open, with mesh, and component separation;  Surgeon: Darren Rivas MD;  Location: [facility];  Service:        SOCIAL HISTORY  Social History     Socioeconomic History   • Marital status:      Spouse name: Lena   • Number of children: 0   • Years of education: College   • Highest education level: Not on file   Occupational History   • Occupation: Retired      Comment: Retired    Tobacco Use   • Smoking status: Former Smoker     Packs/day: 2.00     Years: 3.00     Pack years: 6.00     Types: Cigarettes     Last attempt to quit: 1974     Years since quittin.0   • Smokeless tobacco: Never Used   Substance and Sexual Activity   • Alcohol use: Yes     Frequency: 2-3 times a week     Comment: 2-3 x per week   • Drug use: No   • Sexual activity: Defer   Social History Narrative    self-employed        FAMILY HISTORY  Family History   Problem Relation Age of Onset   • Cerebral aneurysm Mother         cerebral artery aneurysm ( age 56)   • Prostate cancer Brother 68   • Anxiety disorder Father    • Suicide Attempts Father          of suicide   • Cancer Father         bladder   • No Known Problems Brother    • Colon cancer Neg Hx    • Esophageal cancer Neg Hx    • Dementia Neg Hx          **Dragon Disclaimer:   Much of this encounter note is an electronic transcription/translation of spoken language to printed text. The electronic translation of spoken language may permit erroneous, or at times, nonsensical words or phrases to be inadvertently transcribed. Although I have reviewed the note for such errors, some may still exist.       Template created by Moose Bolton MD

## 2020-01-19 LAB
BACTERIA SPEC AEROBE CULT: NORMAL
BACTERIA SPEC AEROBE CULT: NORMAL

## 2020-01-27 ENCOUNTER — TRANSCRIBE ORDERS (OUTPATIENT)
Dept: ADMINISTRATIVE | Facility: HOSPITAL | Age: 70
End: 2020-01-27

## 2020-01-27 DIAGNOSIS — Z87.19 H/O GASTROESOPHAGEAL REFLUX (GERD): Primary | ICD-10-CM

## 2020-01-27 DIAGNOSIS — C15.9 MALIGNANT NEOPLASM OF ESOPHAGUS, UNSPECIFIED LOCATION (HCC): ICD-10-CM

## 2020-01-28 ENCOUNTER — TRANSCRIBE ORDERS (OUTPATIENT)
Dept: ADMINISTRATIVE | Facility: HOSPITAL | Age: 70
End: 2020-01-28

## 2020-01-28 ENCOUNTER — OFFICE VISIT (OUTPATIENT)
Dept: CARDIOLOGY | Facility: CLINIC | Age: 70
End: 2020-01-28

## 2020-01-28 VITALS
HEART RATE: 79 BPM | OXYGEN SATURATION: 100 % | WEIGHT: 205 LBS | DIASTOLIC BLOOD PRESSURE: 68 MMHG | HEIGHT: 75 IN | BODY MASS INDEX: 25.49 KG/M2 | SYSTOLIC BLOOD PRESSURE: 132 MMHG

## 2020-01-28 DIAGNOSIS — R06.02 SHORTNESS OF BREATH: Primary | ICD-10-CM

## 2020-01-28 DIAGNOSIS — I10 ESSENTIAL HYPERTENSION: Chronic | ICD-10-CM

## 2020-01-28 PROCEDURE — 99213 OFFICE O/P EST LOW 20 MIN: CPT | Performed by: INTERNAL MEDICINE

## 2020-01-28 PROCEDURE — 93000 ELECTROCARDIOGRAM COMPLETE: CPT | Performed by: INTERNAL MEDICINE

## 2020-01-28 RX ORDER — TIOTROPIUM BROMIDE AND OLODATEROL 3.124; 2.736 UG/1; UG/1
SPRAY, METERED RESPIRATORY (INHALATION)
Status: ON HOLD | COMMUNITY
Start: 2020-01-27 | End: 2020-02-19 | Stop reason: SDUPTHER

## 2020-01-28 RX ORDER — ALBUTEROL SULFATE 90 UG/1
AEROSOL, METERED RESPIRATORY (INHALATION)
Status: ON HOLD | COMMUNITY
Start: 2020-01-27 | End: 2020-02-19 | Stop reason: SDUPTHER

## 2020-01-28 RX ORDER — CEPHALEXIN 500 MG/1
1 CAPSULE ORAL 3 TIMES DAILY
COMMUNITY
Start: 2020-01-23 | End: 2020-01-31

## 2020-01-28 NOTE — PROGRESS NOTES
Date of Office Visit: 2020    Patient Name: Jose Hurtado  : 1950    Encounter Provider: James Cyr MD  Referring Provider: No ref. provider found  Place of Service: [facility] CARDIOLOGY  Patient Care Team:  Brandin Bolton MD as PCP - General (Internal Medicine)  Tapan, George THORPE II, MD as Consulting Physician (Hematology and Oncology)  Jose Inman MD as Consulting Physician (Urology)  Mulugeta Millan MD as Consulting Physician (Gastroenterology)  Seth Randhawa MD as Consulting Physician (Ophthalmology)      Chief Complaint   Patient presents with   • Follow-up     20 [facility]     History of Present Illness    The patient is a 69-year-old white male who was recently seen in the hospital.  His initial presentation was that of shortness of breath.  An echocardiogram was performed that demonstrated a hyperdynamic left ventricular function.  He was placed on metoprolol tartrate and appears to be feeling better.  He was also given some intravenous Lasix.    The patient has a history of chronic DVT.  He has chronic peripheral edema.  He also has a history of a hypercoagulable state.    Symptomatically he still does have some shortness of breath.  Again the edema is chronic.  Past Medical History:   Diagnosis Date   • Anemia    • Anti-phospholipid syndrome (CMS/HCC)     On lifelong anticoagulation therapy   • Bladder disorder     LEAKAGE   ON MED  wers pads   • Community acquired pneumonia     HISTORY OF IN    • Deep venous thrombosis (CMS/HCC) ,     Left lower extremity multiple   • Depression    • Esophageal carcinoma (CMS/HCC) 2014    had chemo and radiation prior surgery   • Hemorrhoids    • HH (hiatus hernia)    • History of atrial fibrillation 2015    ONE EPISODE WHILE HOSPITALIZED   • History of kidney stones    • History of nephrolithiasis    • History of pancreatitis     PT STATES MANY YEARS AGO   • History of radiation  therapy    • Hypertension    • Long-term (current) use of anticoagulants, INR goal 2.0-3.0    • Lymphedema    • Malignant neoplasm of prostate (CMS/HCC)    • Other hyperlipidemia 1/30/2018 January 30, 2018 lipid panel risk 12.8%   • Restless legs syndrome    • Shortness of breath    • Squamous carcinoma     on the head         Past Surgical History:   Procedure Laterality Date   • APPENDECTOMY  1950   • BRONCHOSCOPY      (Diagnostic)   • CATARACT EXTRACTION Bilateral 2014   • COLONOSCOPY  12/15/2014    Complete / Description: EH, IH, torts, stool, follow-up colonoscopy due in 5 years.   • COLONOSCOPY N/A 6/13/2017    non-thrombosed external hemorrhoids, normal examined ileum, IH   • COLONOSCOPY N/A 2/26/2019    Procedure: COLONOSCOPY with Cold Polypectomy;  Surgeon: Mulugeta Millan MD;  Location: [facility] ENDOSCOPY;  Service: Gastroenterology   • CYSTOSCOPY W/ LASER LITHOTRIPSY     • ENDOSCOPY N/A 6/13/2017    Procedure: ESOPHAGOGASTRODUODENOSCOPY WITH COLD BIOPSY;  Surgeon: Lake Gonzalez MD;  Location: [facility] ENDOSCOPY;  Service:    • ESOPHAGECTOMY      April 2015, stage IIB esophageal carcinoma, sub-total resection.   • ESOPHAGECTOMY      Esophagectomy Subtotal [place] Joe Procedure   • EXCISION LESION  08/2012    Removal of Squamous Cell CA on Head   • HAMMER TOE REPAIR  09/2014    Hammertoe Operation (Each Toe), 10/2014   • HAMMER TOE REPAIR Left 10/3/2017    Procedure: Left second third and fourth distal interphalangeal joint resection with flexor tenotomy;  Surgeon: Mulugeta Lira MD;  Location: [facility] OR OSC;  Service:    • HERNIA REPAIR      incisional   • JEJUNOSTOMY      Laparoscopic   • JEJUNOSTOMY      tube removal    • KNEE SURGERY Bilateral 1967, 1973, 1981   • PATELLA SURGERY Left     removed   • PILONIDAL CYST / SINUS EXCISION     • VT TOTAL KNEE ARTHROPLASTY Right 3/26/2018    Procedure: TOTAL KNEE ARTHROPLASTY;  Surgeon: Renny Solis MD;  Location: [facility] MAIN OR;  Service:  Orthopedics   • PROSTATECTOMY  2010   • PYLOROPLASTY     • SPINAL FUSION  02/1998    C 5,6   • TONSILLECTOMY  1955   • TONSILLECTOMY     • UPPER GASTROINTESTINAL ENDOSCOPY  12/15/2014    LA Grade D esophagitis, Pardo's, HH, multiple duodenal ulcers   • VENTRAL/INCISIONAL HERNIA REPAIR N/A 4/14/2016    Procedure: VENTRAL/INCISIONAL HERNIA REPAIR, open, with mesh, and component separation;  Surgeon: Darren Rivas MD;  Location: [facility];  Service:            Current Outpatient Medications:   •  albuterol sulfate  (90 Base) MCG/ACT inhaler, , Disp: , Rfl:   •  cephalexin (KEFLEX) 500 MG capsule, Take 1 capsule by mouth 3 (Three) Times a Day., Disp: , Rfl:   •  ferrous sulfate 325 (65 FE) MG tablet, TAKE ONE TABLET BY MOUTH DAILY WITH BREAKFAST (Patient taking differently: TAKE TWO TABLET BY MOUTH DAILY WITH BREAKFAST), Disp: 90 tablet, Rfl: 0  •  metoprolol tartrate (LOPRESSOR) 25 MG tablet, Take 0.5 tablets by mouth Every 12 (Twelve) Hours., Disp: 60 tablet, Rfl: 0  •  PARoxetine (PAXIL) 30 MG tablet, Take 1 tablet by mouth Every Morning., Disp: 90 tablet, Rfl: 1  •  pramipexole (MIRAPEX) 1.5 MG tablet, Take 1 tablet by mouth 2 (Two) Times a Day., Disp: 180 tablet, Rfl: 1  •  STIOLTO RESPIMAT 2.5-2.5 MCG/ACT aerosol solution inhaler, , Disp: , Rfl:   •  tamsulosin (FLOMAX) 0.4 MG capsule 24 hr capsule, Take 1 capsule by mouth Every Night., Disp: , Rfl:   •  traZODone (DESYREL) 100 MG tablet, Take 1 tablet by mouth Every Night for 180 days., Disp: 30 tablet, Rfl: 5  •  warfarin (COUMADIN) 5 MG tablet, Take 1 tablet by mouth Daily. (Patient taking differently: Take 5 mg by mouth Daily. 5 MG 5 DAYS A WEEK AND 2.5MG 2 DAYS A WEEK), Disp: 90 tablet, Rfl: 2    Current Facility-Administered Medications:   •  cyanocobalamin injection 1,000 mcg, 1,000 mcg, Intramuscular, Q28 Days, Brandin Bolton MD, 1,000 mcg at 12/27/19 1128      Social History     Socioeconomic History   • Marital status:       "Spouse name: Lena   • Number of children: 0   • Years of education: College   • Highest education level: Not on file   Occupational History   • Occupation: Retired      Comment: Retired    Tobacco Use   • Smoking status: Former Smoker     Packs/day: 2.00     Years: 3.00     Pack years: 6.00     Types: Cigarettes     Last attempt to quit: 1974     Years since quittin.1   • Smokeless tobacco: Never Used   • Tobacco comment: no caffeine    Substance and Sexual Activity   • Alcohol use: Yes     Frequency: 2-3 times a week     Comment: 2-3 x per week   • Drug use: No   • Sexual activity: Defer   Social History Narrative    self-employed          Review of Systems   Constitution: Negative.   HENT: Negative.    Eyes: Negative.    Cardiovascular: Positive for dyspnea on exertion and leg swelling.   Respiratory: Negative.    Endocrine: Negative.    Skin: Negative.    Musculoskeletal: Negative.    Gastrointestinal: Negative.    Neurological: Negative.    Psychiatric/Behavioral: Negative.        Procedures      ECG 12 Lead  Date/Time: 2020 10:12 AM  Performed by: James Cyr MD  Authorized by: James Cyr MD   Comparison: compared with previous ECG from 2020  Rhythm: sinus rhythm  Ectopy: unifocal PVCs  Rate: normal  Conduction: conduction normal  ST Segments: ST segments normal  QRS axis: normal                  Objective:    /68 (BP Location: Left arm, Patient Position: Sitting, Cuff Size: Adult)   Pulse 79   Ht 190.5 cm (75\")   Wt 93 kg (205 lb)   SpO2 100%   BMI 25.62 kg/m²         Physical Exam   Constitutional: He is oriented to person, place, and time. He appears well-developed and well-nourished.   HENT:   Head: Normocephalic.   Eyes: Pupils are equal, round, and reactive to light.   Neck: Normal range of motion. No JVD present. Carotid bruit is not present. No thyromegaly present.   Cardiovascular: Normal rate, regular rhythm, S1 normal, S2 " normal, normal heart sounds and intact distal pulses. Exam reveals no gallop and no friction rub.   No murmur heard.  Pulmonary/Chest: Effort normal and breath sounds normal.   Abdominal: Soft. Bowel sounds are normal.   Musculoskeletal: He exhibits no edema.   Neurological: He is alert and oriented to person, place, and time.   Skin: Skin is warm, dry and intact. No erythema.   Psychiatric: He has a normal mood and affect.   Vitals reviewed.          Assessment:       Diagnosis Plan   1. Shortness of breath     2. Essential hypertension         1.  Dyspnea: Multifactorial  2.  Hypertension: Controlled  3.  Hyperdynamic left ventricular function: Continue beta-blocker.     Plan:

## 2020-01-31 ENCOUNTER — OFFICE VISIT (OUTPATIENT)
Dept: INTERNAL MEDICINE | Age: 70
End: 2020-01-31

## 2020-01-31 VITALS
WEIGHT: 207 LBS | DIASTOLIC BLOOD PRESSURE: 58 MMHG | HEIGHT: 74 IN | BODY MASS INDEX: 26.56 KG/M2 | SYSTOLIC BLOOD PRESSURE: 138 MMHG | HEART RATE: 76 BPM | TEMPERATURE: 96.4 F | OXYGEN SATURATION: 97 %

## 2020-01-31 DIAGNOSIS — D68.61 ANTI-PHOSPHOLIPID SYNDROME (HCC): ICD-10-CM

## 2020-01-31 DIAGNOSIS — F32.9 MAJOR DEPRESSIVE DISORDER WITH CURRENT ACTIVE EPISODE, UNSPECIFIED DEPRESSION EPISODE SEVERITY, UNSPECIFIED WHETHER RECURRENT: ICD-10-CM

## 2020-01-31 DIAGNOSIS — R06.02 SHORTNESS OF BREATH: Primary | Chronic | ICD-10-CM

## 2020-01-31 LAB — INR PPP: 4.2 (ref 2–3)

## 2020-01-31 PROCEDURE — 99214 OFFICE O/P EST MOD 30 MIN: CPT | Performed by: INTERNAL MEDICINE

## 2020-01-31 PROCEDURE — 96372 THER/PROPH/DIAG INJ SC/IM: CPT | Performed by: INTERNAL MEDICINE

## 2020-01-31 PROCEDURE — 85610 PROTHROMBIN TIME: CPT | Performed by: INTERNAL MEDICINE

## 2020-01-31 RX ORDER — WARFARIN SODIUM 5 MG/1
TABLET ORAL
Qty: 90 TABLET | Refills: 2
Start: 2020-01-31 | End: 2020-03-27 | Stop reason: HOSPADM

## 2020-01-31 RX ORDER — FUROSEMIDE 20 MG/1
20 TABLET ORAL DAILY
Qty: 30 TABLET | Refills: 0 | Status: SHIPPED | OUTPATIENT
Start: 2020-01-31 | End: 2020-05-01

## 2020-01-31 RX ORDER — PAROXETINE HYDROCHLORIDE 40 MG/1
40 TABLET, FILM COATED ORAL EVERY MORNING
Qty: 30 TABLET | Refills: 3 | Status: SHIPPED | OUTPATIENT
Start: 2020-01-31 | End: 2020-08-31 | Stop reason: SDUPTHER

## 2020-01-31 RX ORDER — POTASSIUM CHLORIDE 750 MG/1
10 TABLET, FILM COATED, EXTENDED RELEASE ORAL DAILY
Qty: 30 TABLET | Refills: 0 | Status: SHIPPED | OUTPATIENT
Start: 2020-01-31 | End: 2020-03-06

## 2020-01-31 RX ADMIN — CYANOCOBALAMIN 1000 MCG: 1000 INJECTION, SOLUTION INTRAMUSCULAR; SUBCUTANEOUS at 11:09

## 2020-01-31 NOTE — ASSESSMENT & PLAN NOTE
Overall stable but there is some increased bilateral lower extremities edema and weight gain. Was not discharged on furosemide on recent hospitalization for fluid overload/sob.     Start furosemide 20 mg qd and KCL 10 meq daily.     Lab Results   Component Value Date    GLUCOSE 125 (H) 01/16/2020    CALCIUM 8.7 01/16/2020     01/16/2020    K 3.6 01/16/2020    CO2 25.0 01/16/2020     01/16/2020    BUN 26 (H) 01/16/2020    CREATININE 1.04 01/16/2020    EGFRIFAFRI 97 05/10/2018    EGFRIFNONA 70 01/16/2020    BCR 25.0 01/16/2020    ANIONGAP 14.0 01/16/2020

## 2020-01-31 NOTE — PROGRESS NOTES
[facility] INTERNAL MEDICINE  RICHARDSON HEALY M.D.      Jose LITTLE Hurtado / 69 y.o. / male  01/31/2020      CHIEF COMPLAINT     Shortness of Breath; Hyperglycemia; Anemia; and Hypertension      ASSESSMENT & PLAN     Problem List Items Addressed This Visit        High    Depression (Chronic)    Current Assessment & Plan     Increase paroxetine 40 mg qd.          Relevant Medications    traZODone (DESYREL) 100 MG tablet    PARoxetine (PAXIL) 40 MG tablet    Shortness of breath - Primary (Chronic)    Current Assessment & Plan     Overall stable but there is some increased bilateral lower extremities edema and weight gain. Was not discharged on furosemide on recent hospitalization for fluid overload/sob.     Start furosemide 20 mg qd and KCL 10 meq daily.     Lab Results   Component Value Date    GLUCOSE 125 (H) 01/16/2020    CALCIUM 8.7 01/16/2020     01/16/2020    K 3.6 01/16/2020    CO2 25.0 01/16/2020     01/16/2020    BUN 26 (H) 01/16/2020    CREATININE 1.04 01/16/2020    EGFRIFAFRI 97 05/10/2018    EGFRIFNONA 70 01/16/2020    BCR 25.0 01/16/2020    ANIONGAP 14.0 01/16/2020                Medium    Anti-phospholipid syndrome (CMS/HCC) (Chronic)    Overview     DVT LLE in 2003 and 2006  Antiphospholipid syndrome  On life-long AC on warfarin.   GOAL INR : 2 -3          Current Assessment & Plan     Lab Results   Component Value Date    INR 4.20 (A) 01/31/2020    INR 2.91 (H) 01/16/2020    INR 1.92 (H) 01/15/2020    PROTIME 30.1 (H) 01/16/2020    PROTIME 21.6 (H) 01/15/2020    PROTIME 19.9 (H) 01/14/2020        Warfarin dose change:    - Hold 1 dose of warfarin for today  - Then take 2.5 mg M, W, F, Sundays; take 5 mg on Tuesday, Thur, and Saturdays.   - Recheck INR in 10 days           Relevant Medications    warfarin (COUMADIN) 5 MG tablet    Other Relevant Orders    POC INR (Completed)        Orders Placed This Encounter   Procedures   • POC INR     New Medications Ordered This Visit   Medications   • metoprolol  tartrate (LOPRESSOR) 25 MG tablet     Sig: Take 1 tablet by mouth Every 12 (Twelve) Hours.     Dispense:  60 tablet     Refill:  0   • PARoxetine (PAXIL) 40 MG tablet     Sig: Take 1 tablet by mouth Every Morning.     Dispense:  30 tablet     Refill:  3   • furosemide (LASIX) 20 MG tablet     Sig: Take 1 tablet by mouth Daily.     Dispense:  30 tablet     Refill:  0   • potassium chloride (K-DUR) 10 MEQ CR tablet     Sig: Take 1 tablet by mouth Daily. Only while taking furosemide.     Dispense:  30 tablet     Refill:  0   • warfarin (COUMADIN) 5 MG tablet     Si.5 mg Monday, Wednesday, Friday,  ; 5 mg on Tuesday, Thursday, Saturday     Dispense:  90 tablet     Refill:  2       Summary/Discussion:  ·       Next Appointment with me: 3/24/2020    Return for Next scheduled follow up.      MEDICATIONS     Current Outpatient Medications   Medication Sig Dispense Refill   • albuterol sulfate  (90 Base) MCG/ACT inhaler      • ferrous sulfate 325 (65 FE) MG tablet TAKE ONE TABLET BY MOUTH DAILY WITH BREAKFAST (Patient taking differently: TAKE TWO TABLET BY MOUTH DAILY WITH BREAKFAST) 90 tablet 0   • metoprolol tartrate (LOPRESSOR) 25 MG tablet Take 1 tablet by mouth Every 12 (Twelve) Hours. 60 tablet 0   • PARoxetine (PAXIL) 40 MG tablet Take 1 tablet by mouth Every Morning. 30 tablet 3   • pramipexole (MIRAPEX) 1.5 MG tablet Take 1 tablet by mouth 2 (Two) Times a Day. 180 tablet 1   • STIOLTO RESPIMAT 2.5-2.5 MCG/ACT aerosol solution inhaler      • tamsulosin (FLOMAX) 0.4 MG capsule 24 hr capsule Take 1 capsule by mouth Every Night.     • traZODone (DESYREL) 100 MG tablet Take 1 tablet by mouth Every Night for 180 days. 30 tablet 5   • warfarin (COUMADIN) 5 MG tablet PER FLOWSHEET 90 tablet 2     Current Facility-Administered Medications   Medication Dose Route Frequency Provider Last Rate Last Dose   • cyanocobalamin injection 1,000 mcg  1,000 mcg Intramuscular Q28 Days Brandin Bolton MD   1,000 mcg at  "01/31/20 1109          VITAL SIGNS     Visit Vitals  /58   Pulse 76   Temp 96.4 °F (35.8 °C)   Ht 188 cm (74\")   Wt 93.9 kg (207 lb)   SpO2 97%   BMI 26.58 kg/m²       BP Readings from Last 3 Encounters:   01/31/20 138/58   01/28/20 132/68   01/17/20 100/58     Wt Readings from Last 3 Encounters:   01/31/20 93.9 kg (207 lb)   01/28/20 93 kg (205 lb)   01/17/20 88.5 kg (195 lb)      Body mass index is 26.58 kg/m².      HISTORY OF PRESENT ILLNESS     Anti-phospholipid Ab syndrome on chronic AC therapy: no bleeding/bruising. INR today is high 4.2. No change in medications/diet. Was recently hospitalized for shortness of breath and treated with IV diuresis but was not discharged home on diuretic. Weight is coming up and edema of legs have increased a bit. Denies worsening shortness of breath. Blood pressure is coming back up now since decreasing metoprolol dose on last visit. Home blood pressure is generally > 120-130's SBP. Thinks he could use a slight bump in paroxetine dose for depression.       Patient Care Team:  Brandin Bolton MD as PCP - General (Internal Medicine)  Tapan, George THORPE II, MD as Consulting Physician (Hematology and Oncology)  Jose Inman MD as Consulting Physician (Urology)  Mulugeta Millan MD as Consulting Physician (Gastroenterology)  Seth Rnadhawa MD as Consulting Physician (Ophthalmology)      REVIEW OF SYSTEMS     Review of Systems  Constitutional neg  Resp neg for worsening shortness of breath   CV neg for chest pain   GI negative for melena or bleeding  Heme neg for bruising    PHYSICAL EXAMINATION     Physical Exam  Constitutional  No distress  Cardiovascular Rate  normal . Rhythm: regular . Heart sounds:  Normal. 2+ bilateral lower extremities edema.   Lungs: crackles at lung bases   Psychiatric  Alert. Judgment intact. Thought content normal. Mood stable     REVIEWED DATA     Labs:     Lab Results   Component Value Date    INR 4.20 (A) 01/31/2020    INR 2.91 (H) " 01/16/2020    INR 1.92 (H) 01/15/2020      Lab Results   Component Value Date     01/16/2020    K 3.6 01/16/2020    CALCIUM 8.7 01/16/2020    AST 11 01/15/2020    ALT 11 01/15/2020    BUN 26 (H) 01/16/2020    CREATININE 1.04 01/16/2020    CREATININE 1.10 01/15/2020    CREATININE 1.18 01/14/2020    EGFRIFNONA 70 01/16/2020    EGFRIFAFRI 97 05/10/2018       Lab Results   Component Value Date    HGBA1C 6.20 (H) 01/17/2020     (H) 05/10/2018    GLU 91 04/16/2018       Lab Results   Component Value Date    LDL 92 05/10/2018     (H) 01/30/2018    LDL 98 01/26/2017    HDL 45 05/10/2018    HDL 55 01/30/2018    HDL 67 (H) 01/26/2017    TRIG 79 05/10/2018    TRIG 74 01/30/2018    TRIG 47 01/26/2017       Lab Results   Component Value Date    TSH 2.710 01/17/2020    FREET4 1.52 01/17/2020       Lab Results   Component Value Date    WBC 5.21 01/16/2020    HGB 9.9 (L) 01/16/2020    HGB 8.8 (L) 01/15/2020    HGB 10.2 (L) 01/14/2020     01/16/2020       Lab Results   Component Value Date    GLUCOSEU Negative 03/15/2018    BLOODU Negative 03/15/2018    NITRITEU Negative 03/15/2018    LEUKOCYTESUR Negative 03/15/2018       Imaging:         Medical Tests:     Echocardiogram 1/15/20:   · Estimated EF = 72%.  · Left ventricular systolic function is hyperdynamic (EF > 70).  · Calculated right ventricular systolic pressure from tricuspid regurgitation is 27.4 mmHg.    Summary of old records / correspondence / consultant report:         Request outside records:         ALLERGIES  No Known Allergies     PFSH:     The following portions of the patient's history were reviewed and updated as appropriate: Allergies / Current Medications / Past Medical History / Surgical History / Social History / Family History    PROBLEM LIST   Patient Active Problem List   Diagnosis   • Adenocarcinoma of esophagus (CMS/HCC)   • Adenomatous polyp of colon   • Malignant neoplasm of prostate (CMS/HCC)   • RLS (restless legs syndrome)    • Depression   • Hypertension   • Anti-phospholipid syndrome (CMS/HCC)   • Anemia   • Insomnia due to other mental disorder   • Peripheral polyneuropathy   • B12 deficiency   • Postsurgical malabsorption   • Shortness of breath   • Long-term (current) use of anticoagulants, INR goal 2.0-3.0   • Lymphedema       PAST MEDICAL HISTORY  Past Medical History:   Diagnosis Date   • Anemia    • Anti-phospholipid syndrome (CMS/HCC)     On lifelong anticoagulation therapy   • Bladder disorder     LEAKAGE   ON MED  wers pads   • Community acquired pneumonia     HISTORY OF IN 2014   • Deep venous thrombosis (CMS/HCC) 2006, 2008    Left lower extremity multiple   • Depression    • Esophageal carcinoma (CMS/HCC) 12/31/2014    had chemo and radiation prior surgery   • Hemorrhoids    • HH (hiatus hernia)    • History of atrial fibrillation 2015    ONE EPISODE WHILE HOSPITALIZED   • History of kidney stones    • History of nephrolithiasis    • History of pancreatitis     PT STATES MANY YEARS AGO   • History of radiation therapy    • Hypertension    • Long-term (current) use of anticoagulants, INR goal 2.0-3.0    • Lymphedema    • Malignant neoplasm of prostate (CMS/HCC)    • Other hyperlipidemia 1/30/2018 January 30, 2018 lipid panel risk 12.8%   • Restless legs syndrome    • Shortness of breath    • Squamous carcinoma     on the head       SURGICAL HISTORY  Past Surgical History:   Procedure Laterality Date   • APPENDECTOMY  1950   • BRONCHOSCOPY      (Diagnostic)   • CATARACT EXTRACTION Bilateral 2014   • COLONOSCOPY  12/15/2014    Complete / Description: EH, IH, torts, stool, follow-up colonoscopy due in 5 years.   • COLONOSCOPY N/A 6/13/2017    non-thrombosed external hemorrhoids, normal examined ileum, IH   • COLONOSCOPY N/A 2/26/2019    Procedure: COLONOSCOPY with Cold Polypectomy;  Surgeon: Mulugeta Millan MD;  Location: [facility] ENDOSCOPY;  Service: Gastroenterology   • CYSTOSCOPY W/ LASER LITHOTRIPSY     •  ENDOSCOPY N/A 6/13/2017    Procedure: ESOPHAGOGASTRODUODENOSCOPY WITH COLD BIOPSY;  Surgeon: Lake Gonzalez MD;  Location: [facility] ENDOSCOPY;  Service:    • ESOPHAGECTOMY      April 2015, stage IIB esophageal carcinoma, sub-total resection.   • ESOPHAGECTOMY      Esophagectomy Subtotal Andrea Joe Procedure   • EXCISION LESION  08/2012    Removal of Squamous Cell CA on Head   • HAMMER TOE REPAIR  09/2014    Hammertoe Operation (Each Toe), 10/2014   • HAMMER TOE REPAIR Left 10/3/2017    Procedure: Left second third and fourth distal interphalangeal joint resection with flexor tenotomy;  Surgeon: Mulugeta Lira MD;  Location: [facility] OR OSC;  Service:    • HERNIA REPAIR      incisional   • JEJUNOSTOMY      Laparoscopic   • JEJUNOSTOMY      tube removal    • KNEE SURGERY Bilateral 1967, 1973, 1981   • PATELLA SURGERY Left     removed   • PILONIDAL CYST / SINUS EXCISION     • ND TOTAL KNEE ARTHROPLASTY Right 3/26/2018    Procedure: TOTAL KNEE ARTHROPLASTY;  Surgeon: Renny Solis MD;  Location: [facility] OR;  Service: Orthopedics   • PROSTATECTOMY  2010   • PYLOROPLASTY     • SPINAL FUSION  02/1998    C 5,6   • TONSILLECTOMY  1955   • TONSILLECTOMY     • UPPER GASTROINTESTINAL ENDOSCOPY  12/15/2014    LA Grade D esophagitis, Pardo's, HH, multiple duodenal ulcers   • VENTRAL/INCISIONAL HERNIA REPAIR N/A 4/14/2016    Procedure: VENTRAL/INCISIONAL HERNIA REPAIR, open, with mesh, and component separation;  Surgeon: Darren Rivas MD;  Location: [facility] OR;  Service:        SOCIAL HISTORY  Social History     Socioeconomic History   • Marital status:      Spouse name: Lena   • Number of children: 0   • Years of education: College   • Highest education level: Not on file   Occupational History   • Occupation: Retired      Comment: Retired 2014   Tobacco Use   • Smoking status: Former Smoker     Packs/day: 2.00     Years: 3.00     Pack years: 6.00     Types: Cigarettes      Last attempt to quit: 1974     Years since quittin.1   • Smokeless tobacco: Never Used   • Tobacco comment: no caffeine    Substance and Sexual Activity   • Alcohol use: Yes     Frequency: 2-3 times a week     Comment: 2-3 x per week   • Drug use: No   • Sexual activity: Defer   Social History Narrative    self-employed        FAMILY HISTORY  Family History   Problem Relation Age of Onset   • Cerebral aneurysm Mother         cerebral artery aneurysm ( age 56)   • Prostate cancer Brother 68   • Anxiety disorder Father    • Suicide Attempts Father          of suicide   • Cancer Father         bladder   • No Known Problems Brother    • Colon cancer Neg Hx    • Esophageal cancer Neg Hx    • Dementia Neg Hx          **Dragon Disclaimer:   Much of this encounter note is an electronic transcription/translation of spoken language to printed text. The electronic translation of spoken language may permit erroneous, or at times, nonsensical words or phrases to be inadvertently transcribed. Although I have reviewed the note for such errors, some may still exist.       Template created by Moose Bolton MD

## 2020-01-31 NOTE — ASSESSMENT & PLAN NOTE
Lab Results   Component Value Date    INR 4.20 (A) 01/31/2020    INR 2.91 (H) 01/16/2020    INR 1.92 (H) 01/15/2020    PROTIME 30.1 (H) 01/16/2020    PROTIME 21.6 (H) 01/15/2020    PROTIME 19.9 (H) 01/14/2020        Warfarin dose change:    - Hold 1 dose of warfarin for today  - Then take 2.5 mg M, W, F, Sundays; take 5 mg on Tuesday, Thur, and Saturdays.   - Recheck INR in 10 days

## 2020-01-31 NOTE — PATIENT INSTRUCTIONS
** IMPORTANT MESSAGE FROM DR. HEALY **    In our office, your satisfaction is VERY important to us.     You may receive a survey from [facility] by mail or E-mail for you to provide feedback about your visit. This information is invaluable for me to know what we can do to improve our services.     I ask that you please take a few minutes to complete the survey and let us know how we are doing in serving your needs. (You may receive the survey more than once for multiple visits)    Thank You !    Dr. Healy & Staff    _________________________________________________________________________________________________________________________      ** ADDITIONAL INSTRUCTION / REMINDERS FROM DR. HEALY **    Warfarin dose change:    - Hold 1 dose of warfarin for today  - Then take 2.5 mg M, W, F, Sundays; take 5 mg on Tuesday, Thur, and Saturdays.   - Recheck INR in 10 days

## 2020-01-31 NOTE — PROGRESS NOTES
Update INR flowsheet. Instructions were given to patient during visit.     INR is high at 4.2.     - Hold 1 dose of warfarin for today  - Then take 2.5 mg M, W, F, Sundays; take 5 mg on Tuesday, Thur, and Saturdays.   - Recheck INR in 10 days

## 2020-02-05 ENCOUNTER — LAB (OUTPATIENT)
Dept: OTHER | Facility: HOSPITAL | Age: 70
End: 2020-02-05

## 2020-02-05 DIAGNOSIS — C15.9 ADENOCARCINOMA OF ESOPHAGUS (HCC): ICD-10-CM

## 2020-02-05 LAB
BASOPHILS # BLD AUTO: 0.03 10*3/MM3 (ref 0–0.2)
BASOPHILS NFR BLD AUTO: 0.7 % (ref 0–1.5)
DEPRECATED RDW RBC AUTO: 50.9 FL (ref 37–54)
EOSINOPHIL # BLD AUTO: 0.21 10*3/MM3 (ref 0–0.4)
EOSINOPHIL NFR BLD AUTO: 5.1 % (ref 0.3–6.2)
ERYTHROCYTE [DISTWIDTH] IN BLOOD BY AUTOMATED COUNT: 15 % (ref 12.3–15.4)
HCT VFR BLD AUTO: 28.9 % (ref 37.5–51)
HGB BLD-MCNC: 9.2 G/DL (ref 13–17.7)
IMM GRANULOCYTES # BLD AUTO: 0.02 10*3/MM3 (ref 0–0.05)
IMM GRANULOCYTES NFR BLD AUTO: 0.5 % (ref 0–0.5)
LYMPHOCYTES # BLD AUTO: 1.28 10*3/MM3 (ref 0.7–3.1)
LYMPHOCYTES NFR BLD AUTO: 31.1 % (ref 19.6–45.3)
MCH RBC QN AUTO: 29.2 PG (ref 26.6–33)
MCHC RBC AUTO-ENTMCNC: 31.8 G/DL (ref 31.5–35.7)
MCV RBC AUTO: 91.7 FL (ref 79–97)
MONOCYTES # BLD AUTO: 0.44 10*3/MM3 (ref 0.1–0.9)
MONOCYTES NFR BLD AUTO: 10.7 % (ref 5–12)
NEUTROPHILS # BLD AUTO: 2.13 10*3/MM3 (ref 1.7–7)
NEUTROPHILS NFR BLD AUTO: 51.9 % (ref 42.7–76)
NRBC BLD AUTO-RTO: 0 /100 WBC (ref 0–0.2)
PLATELET # BLD AUTO: 187 10*3/MM3 (ref 140–450)
PMV BLD AUTO: 9.3 FL (ref 6–12)
RBC # BLD AUTO: 3.15 10*6/MM3 (ref 4.14–5.8)
WBC NRBC COR # BLD: 4.11 10*3/MM3 (ref 3.4–10.8)

## 2020-02-05 PROCEDURE — 85025 COMPLETE CBC W/AUTO DIFF WBC: CPT | Performed by: INTERNAL MEDICINE

## 2020-02-05 PROCEDURE — 36415 COLL VENOUS BLD VENIPUNCTURE: CPT

## 2020-02-07 ENCOUNTER — OFFICE VISIT (OUTPATIENT)
Dept: ONCOLOGY | Facility: CLINIC | Age: 70
End: 2020-02-07

## 2020-02-07 ENCOUNTER — APPOINTMENT (OUTPATIENT)
Dept: OTHER | Facility: HOSPITAL | Age: 70
End: 2020-02-07

## 2020-02-07 VITALS
TEMPERATURE: 97.4 F | OXYGEN SATURATION: 100 % | DIASTOLIC BLOOD PRESSURE: 68 MMHG | HEIGHT: 74 IN | SYSTOLIC BLOOD PRESSURE: 138 MMHG | RESPIRATION RATE: 16 BRPM | HEART RATE: 60 BPM | BODY MASS INDEX: 27.86 KG/M2 | WEIGHT: 217.1 LBS

## 2020-02-07 DIAGNOSIS — C15.9 ADENOCARCINOMA OF ESOPHAGUS (HCC): Primary | ICD-10-CM

## 2020-02-07 LAB
ALBUMIN SERPL-MCNC: 3.5 G/DL (ref 3.5–5.2)
ALBUMIN/GLOB SERPL: 1 G/DL
ALP SERPL-CCNC: 117 U/L (ref 39–117)
ALT SERPL W P-5'-P-CCNC: 25 U/L (ref 1–41)
ANION GAP SERPL CALCULATED.3IONS-SCNC: 9.8 MMOL/L (ref 5–15)
AST SERPL-CCNC: 52 U/L (ref 1–40)
BILIRUB SERPL-MCNC: 0.2 MG/DL (ref 0.1–1.2)
BUN BLD-MCNC: 26 MG/DL (ref 8–23)
BUN/CREAT SERPL: 17.6 (ref 7–25)
CALCIUM SPEC-SCNC: 9 MG/DL (ref 8.6–10.5)
CHLORIDE SERPL-SCNC: 103 MMOL/L (ref 98–107)
CO2 SERPL-SCNC: 27.2 MMOL/L (ref 22–29)
CREAT BLD-MCNC: 1.48 MG/DL (ref 0.76–1.27)
DAT POLY-SP REAG RBC QL: NEGATIVE
FERRITIN SERPL-MCNC: 616.2 NG/ML (ref 30–400)
GFR SERPL CREATININE-BSD FRML MDRD: 47 ML/MIN/1.73
GLOBULIN UR ELPH-MCNC: 3.6 GM/DL
GLUCOSE BLD-MCNC: 113 MG/DL (ref 65–99)
HAPTOGLOB SERPL-MCNC: 355 MG/DL (ref 30–200)
HGB RETIC QN AUTO: 30.8 PG (ref 29.8–36.1)
IMM RETICS NFR: 14.3 % (ref 3–15.8)
IRON 24H UR-MRATE: 41 MCG/DL (ref 59–158)
IRON SATN MFR SERPL: 15 % (ref 20–50)
LDH SERPL-CCNC: 180 U/L (ref 135–225)
POTASSIUM BLD-SCNC: 4.7 MMOL/L (ref 3.5–5.2)
PROT SERPL-MCNC: 7.1 G/DL (ref 6–8.5)
RETICS/RBC NFR AUTO: 1.41 % (ref 0.7–1.9)
SODIUM BLD-SCNC: 140 MMOL/L (ref 136–145)
TIBC SERPL-MCNC: 267 MCG/DL (ref 298–536)
TRANSFERRIN SERPL-MCNC: 179 MG/DL (ref 200–360)
VIT B12 BLD-MCNC: 757 PG/ML (ref 211–946)

## 2020-02-07 PROCEDURE — 86880 COOMBS TEST DIRECT: CPT | Performed by: INTERNAL MEDICINE

## 2020-02-07 PROCEDURE — 99214 OFFICE O/P EST MOD 30 MIN: CPT | Performed by: INTERNAL MEDICINE

## 2020-02-07 PROCEDURE — 85046 RETICYTE/HGB CONCENTRATE: CPT | Performed by: INTERNAL MEDICINE

## 2020-02-07 PROCEDURE — 83540 ASSAY OF IRON: CPT | Performed by: INTERNAL MEDICINE

## 2020-02-07 PROCEDURE — 36415 COLL VENOUS BLD VENIPUNCTURE: CPT | Performed by: INTERNAL MEDICINE

## 2020-02-07 PROCEDURE — 85014 HEMATOCRIT: CPT | Performed by: INTERNAL MEDICINE

## 2020-02-07 PROCEDURE — 83010 ASSAY OF HAPTOGLOBIN QUANT: CPT | Performed by: INTERNAL MEDICINE

## 2020-02-07 PROCEDURE — 82747 ASSAY OF FOLIC ACID RBC: CPT | Performed by: INTERNAL MEDICINE

## 2020-02-07 PROCEDURE — 80053 COMPREHEN METABOLIC PANEL: CPT | Performed by: INTERNAL MEDICINE

## 2020-02-07 PROCEDURE — 83615 LACTATE (LD) (LDH) ENZYME: CPT | Performed by: INTERNAL MEDICINE

## 2020-02-07 PROCEDURE — 82728 ASSAY OF FERRITIN: CPT | Performed by: INTERNAL MEDICINE

## 2020-02-07 PROCEDURE — 82607 VITAMIN B-12: CPT | Performed by: INTERNAL MEDICINE

## 2020-02-07 PROCEDURE — 84466 ASSAY OF TRANSFERRIN: CPT | Performed by: INTERNAL MEDICINE

## 2020-02-07 NOTE — PROGRESS NOTES
Subjective .     REASONS FOR FOLLOWUP:  Esophageal cancer, cytopenias     HISTORY OF PRESENT ILLNESS:  The patient is a 70 y.o. year old male  who is here for follow-up with the above-mentioned history.    Having some mild dyspnea on exertion recently.  Has seen Dr. Sommers of pulmonology and Dr. Keyes of cardiology for this within the past few weeks.  He states he was told he had some scarring on his lung which may be causing the AGUILERA.    Denies bleeding, chest pain.    Past Medical History:   Diagnosis Date   • Anemia    • Anti-phospholipid syndrome (CMS/HCC)     On lifelong anticoagulation therapy   • Bladder disorder     LEAKAGE   ON MED  wers pads   • Community acquired pneumonia     HISTORY OF IN 2014   • Deep venous thrombosis (CMS/HCC) 2006, 2008    Left lower extremity multiple   • Depression    • Esophageal carcinoma (CMS/HCC) 12/31/2014    had chemo and radiation prior surgery   • Hemorrhoids    • HH (hiatus hernia)    • History of atrial fibrillation 2015    ONE EPISODE WHILE HOSPITALIZED   • History of kidney stones    • History of nephrolithiasis    • History of pancreatitis     PT STATES MANY YEARS AGO   • History of radiation therapy    • Hypertension    • Long-term (current) use of anticoagulants, INR goal 2.0-3.0    • Lymphedema    • Malignant neoplasm of prostate (CMS/HCC)    • Other hyperlipidemia 1/30/2018 January 30, 2018 lipid panel risk 12.8%   • Restless legs syndrome    • Shortness of breath    • Squamous carcinoma     on the head     Past Surgical History:   Procedure Laterality Date   • APPENDECTOMY  1950   • BRONCHOSCOPY      (Diagnostic)   • CATARACT EXTRACTION Bilateral 2014   • COLONOSCOPY  12/15/2014    Complete / Description: EH, IH, torts, stool, follow-up colonoscopy due in 5 years.   • COLONOSCOPY N/A 6/13/2017    non-thrombosed external hemorrhoids, normal examined ileum, IH   • COLONOSCOPY N/A 2/26/2019    Procedure: COLONOSCOPY with Cold Polypectomy;  Surgeon: Yana  Mulugeta BLACKWELL MD;  Location: [facility] ENDOSCOPY;  Service: Gastroenterology   • CYSTOSCOPY W/ LASER LITHOTRIPSY     • ENDOSCOPY N/A 6/13/2017    Procedure: ESOPHAGOGASTRODUODENOSCOPY WITH COLD BIOPSY;  Surgeon: Lake Gonzalez MD;  Location: [facility] ENDOSCOPY;  Service:    • ESOPHAGECTOMY      April 2015, stage IIB esophageal carcinoma, sub-total resection.   • ESOPHAGECTOMY      Esophagectomy Subtotal Andrea Joe Procedure   • EXCISION LESION  08/2012    Removal of Squamous Cell CA on Head   • HAMMER TOE REPAIR  09/2014    Hammertoe Operation (Each Toe), 10/2014   • HAMMER TOE REPAIR Left 10/3/2017    Procedure: Left second third and fourth distal interphalangeal joint resection with flexor tenotomy;  Surgeon: Mulugeta Lira MD;  Location: [facility] OR OSC;  Service:    • HERNIA REPAIR      incisional   • JEJUNOSTOMY      Laparoscopic   • JEJUNOSTOMY      tube removal    • KNEE SURGERY Bilateral 1967, 1973, 1981   • PATELLA SURGERY Left     removed   • PILONIDAL CYST / SINUS EXCISION     • IN TOTAL KNEE ARTHROPLASTY Right 3/26/2018    Procedure: TOTAL KNEE ARTHROPLASTY;  Surgeon: Renny Solis MD;  Location: [facility] MAIN OR;  Service: Orthopedics   • PROSTATECTOMY  2010   • PYLOROPLASTY     • SPINAL FUSION  02/1998    C 5,6   • TONSILLECTOMY  1955   • TONSILLECTOMY     • UPPER GASTROINTESTINAL ENDOSCOPY  12/15/2014    LA Grade D esophagitis, Pardo's, HH, multiple duodenal ulcers   • VENTRAL/INCISIONAL HERNIA REPAIR N/A 4/14/2016    Procedure: VENTRAL/INCISIONAL HERNIA REPAIR, open, with mesh, and component separation;  Surgeon: Darren Rivas MD;  Location: [facility] OR;  Service:        HEMATOLOGIC/ONCOLOGIC HISTORY:  (History from previous dates can be found in the separate document.)    MEDICATIONS    Current Outpatient Medications:   •  albuterol sulfate  (90 Base) MCG/ACT inhaler, , Disp: , Rfl:   •  ferrous sulfate 325 (65 FE) MG tablet, TAKE ONE TABLET BY MOUTH DAILY WITH BREAKFAST  (Patient taking differently: TAKE TWO TABLET BY MOUTH DAILY WITH BREAKFAST), Disp: 90 tablet, Rfl: 0  •  furosemide (LASIX) 20 MG tablet, Take 1 tablet by mouth Daily., Disp: 30 tablet, Rfl: 0  •  metoprolol tartrate (LOPRESSOR) 25 MG tablet, Take 1 tablet by mouth Every 12 (Twelve) Hours., Disp: 60 tablet, Rfl: 0  •  PARoxetine (PAXIL) 40 MG tablet, Take 1 tablet by mouth Every Morning., Disp: 30 tablet, Rfl: 3  •  potassium chloride (K-DUR) 10 MEQ CR tablet, Take 1 tablet by mouth Daily. Only while taking furosemide., Disp: 30 tablet, Rfl: 0  •  pramipexole (MIRAPEX) 1.5 MG tablet, Take 1 tablet by mouth 2 (Two) Times a Day., Disp: 180 tablet, Rfl: 1  •  STIOLTO RESPIMAT 2.5-2.5 MCG/ACT aerosol solution inhaler, , Disp: , Rfl:   •  tamsulosin (FLOMAX) 0.4 MG capsule 24 hr capsule, Take 1 capsule by mouth Every Night., Disp: , Rfl:   •  traZODone (DESYREL) 100 MG tablet, Take 1 tablet by mouth Every Night for 180 days., Disp: 30 tablet, Rfl: 5  •  warfarin (COUMADIN) 5 MG tablet, 2.5 mg Monday, Wednesday, Friday,  ; 5 mg on Tuesday, Thursday, Saturday, Disp: 90 tablet, Rfl: 2    Current Facility-Administered Medications:   •  cyanocobalamin injection 1,000 mcg, 1,000 mcg, Intramuscular, Q28 Days, Brandin Bolton MD, 1,000 mcg at 20 1109    ALLERGIES:   No Known Allergies    SOCIAL HISTORY:       Social History     Socioeconomic History   • Marital status:      Spouse name: Lena   • Number of children: 0   • Years of education: College   • Highest education level: Not on file   Occupational History   • Occupation: Retired      Comment: Retired    Tobacco Use   • Smoking status: Former Smoker     Packs/day: 2.00     Years: 3.00     Pack years: 6.00     Types: Cigarettes     Last attempt to quit: 1974     Years since quittin.1   • Smokeless tobacco: Never Used   • Tobacco comment: no caffeine    Substance and Sexual Activity   • Alcohol use: Yes     Frequency: 2-3 times  "a week     Comment: 2-3 x per week   • Drug use: No   • Sexual activity: Defer   Social History Narrative    self-employed          FAMILY HISTORY:  Family History   Problem Relation Age of Onset   • Cerebral aneurysm Mother         cerebral artery aneurysm ( age 56)   • Prostate cancer Brother 68   • Anxiety disorder Father    • Suicide Attempts Father          of suicide   • Cancer Father         bladder   • No Known Problems Brother    • Colon cancer Neg Hx    • Esophageal cancer Neg Hx    • Dementia Neg Hx        REVIEW OF SYSTEMS:    Review of Systems   Constitutional: Negative for activity change.   HENT: Negative for nosebleeds and trouble swallowing.    Respiratory: Negative for shortness of breath and wheezing.    Cardiovascular: Negative for chest pain and palpitations.   Gastrointestinal: Negative for constipation and diarrhea.   Genitourinary: Negative for dysuria and hematuria.   Musculoskeletal: Positive for back pain. Negative for arthralgias and myalgias.   Neurological: Negative for seizures and syncope.   Hematological: Negative for adenopathy. Does not bruise/bleed easily.   Psychiatric/Behavioral: Negative for confusion.           Objective    Vitals:    20 1008   BP: 138/68   Pulse: 60   Resp: 16   Temp: 97.4 °F (36.3 °C)   TempSrc: Oral   SpO2: 100%   Weight: 98.5 kg (217 lb 1.6 oz)   Height: 188 cm (74.02\")   PainSc: 0-No pain     Current Status 2020   ECOG score 0      PHYSICAL EXAM:    CONSTITUTIONAL:  Vital signs reviewed.  He is in no distress and he looks comfortable.  Pleasant as usual.  EYES:  Conjunctiva and lids unremarkable.  PERRLA  EARS,NOSE,MOUTH,THROAT:  Ears and nose appear unremarkable.  Lips, teeth, gums appear unremarkable.  RESPIRATORY:  Normal respiratory effort.  Lungs clear to auscultation bilaterally.  CARDIOVASCULAR:  Normal S1, S2.  No murmurs rubs or gallops.  Left leg significantly larger than right, chronic since DVT  GASTROINTESTINAL: " Abdomen appears unremarkable.  Nontender.  No hepatomegaly.  No splenomegaly.  LYMPHATIC:  No cervical, supraclavicular, axillary lymphadenopathy.  SKIN:  Warm.  No rashes.  PSYCHIATRIC:  Normal judgment and insight.  Normal mood and affect.      RECENT LABS:        WBC   Date/Time Value Ref Range Status   02/05/2020 11:22 AM 4.11 3.40 - 10.80 10*3/mm3 Final   04/15/2019 12:31 PM 3.53 3.40 - 10.80 10*3/mm3 Final   04/16/2018 04:25 PM 4.23 (L) 4.5 - 11.0 10*3/uL Final     Hemoglobin   Date/Time Value Ref Range Status   02/05/2020 11:22 AM 9.2 (L) 13.0 - 17.7 g/dL Final   04/16/2018 04:25 PM 9.9 (L) 13.5 - 17.5 g/dL Final     Platelets   Date/Time Value Ref Range Status   02/05/2020 11:22  140 - 450 10*3/mm3 Final   04/16/2018 04:25  140 - 440 10*3/uL Final       Assessment/Plan     ASSESSMENT:  There are no diagnoses linked to this encounter.    *Esophageal adenocarcinoma. Initially T2N0M0. After neoadjuvant carboplatin/Taxol with radiation, achieved a pathologic CR at resection, 4/24/2015.   · As per NCCN guidelines, plan CAT scans every 6 months x1 year, then every 6 to 9 months on years 2 and years 3. Defer any surveillance EGDs to Dr. Gonzalez if he feels they are appropriate.   · Also as per NCCN guidelines, plan H and P every 3 to 6 months on years 1 and years 2, then every 6 to 12 months' time on years 3 through years 5 and then annually.   · EGD 6/13/17 by Dr. Gonzalez: No evidence of recurrence.  · CT scan 3/2/18 (this completed 3 years of surveillance CT): No evidence of recurrence.  Plan no more surveillance CT.  No signs of recurrence.  Remains in remission.    *Recurrent DVT, prior to cancer diagnosis.    · Dr. Bolton manages his Coumadin.   · Chronic left leg larger than right, since DVT  No signs of recurrent clots.    *Leukocytopenia, anemia, thrombocytopenia.   ·  He had a white blood cell count in the upper 3s and a hemoglobin in the upper 12s with a normal platelet count prior to beginning  chemotherapy. We will monitor this.   WBC 4.1    *Persistent cough.  He has seen Dr. Maher Sayied for this.    He did not complain of this today.    *Previously complained of emesis occurring 5 hours after eating on average once every month or 2.  No nausea otherwise.  Denies dysphagia or odynophagia.    Unchanged.  Occurring on average once every couple of months.  He did not complain of this today    *Iron deficiency anemia  · Hb mostly around 11  · On 4/15/2019, ferritin 29.7, 13% saturation.  Serum folate normal.  · On oral iron daily through PCP.  · On 8/23/19, ferritin 65, 14%.   · Increased oral iron.   · On 10/15/19, ferritin 72, 10%.  · On 10/25/2019, stopped oral iron since it was not helping.    · 2 doses Injectafer.  · On 12/13/2019, ferritin 708, 15% saturation, Hb 10.4.  · Therefore, no IV iron.  Monitor.  Hb 9.2  Recheck anemia labs today due to persistent anemia.    *Source of iron deficiency.  · Last colonoscopy 2/26/2019 by Dr. Millan.  Last EGD 6/13/2017 by Dr. Gonzalez.  · Suspect he has an absorption issue due to previous esophageal surgery.    *B12 deficiency.  · On 6/6/2019, B12 <150  · On B12 injections through PCP.  Today he states he remains on B12 injections.    PLAN:  MD 2 months.  CBC and iron labs 1 week prior  Anemia labs today  I told him if Hb remains around 9 or so, we may want to consider a bone marrow biopsy at some point.    Previous plan was:  · M.D. 6 months.  1 week prior: Iron labs.  · No more surveillance CT scans.  · He does not have a port.    Addendum  Creatinine and AST elevated. I do not think these are the reasons for the ongoing worsened Hb as his Hb is at a similar level compared to January when creatinine and AST were normal.    I sent a message to his PCP regarding these lab elevations.

## 2020-02-10 ENCOUNTER — HOSPITAL ENCOUNTER (INPATIENT)
Facility: HOSPITAL | Age: 70
LOS: 9 days | Discharge: NURSING, REHAB OR LONG-TERM CARE | End: 2020-02-19
Attending: EMERGENCY MEDICINE | Admitting: INTERNAL MEDICINE

## 2020-02-10 ENCOUNTER — TELEPHONE (OUTPATIENT)
Dept: INTERNAL MEDICINE | Age: 70
End: 2020-02-10

## 2020-02-10 ENCOUNTER — APPOINTMENT (OUTPATIENT)
Dept: GENERAL RADIOLOGY | Facility: HOSPITAL | Age: 70
End: 2020-02-10

## 2020-02-10 DIAGNOSIS — J18.9 PNEUMONIA OF RIGHT LOWER LOBE DUE TO INFECTIOUS ORGANISM: Primary | ICD-10-CM

## 2020-02-10 DIAGNOSIS — R68.89 RIGORS: ICD-10-CM

## 2020-02-10 DIAGNOSIS — I50.9 CONGESTIVE HEART FAILURE, UNSPECIFIED HF CHRONICITY, UNSPECIFIED HEART FAILURE TYPE (HCC): Primary | ICD-10-CM

## 2020-02-10 PROBLEM — E61.1 IRON DEFICIENCY: Chronic | Status: ACTIVE | Noted: 2020-02-10

## 2020-02-10 PROBLEM — J44.1 COPD EXACERBATION: Status: ACTIVE | Noted: 2020-02-10

## 2020-02-10 LAB
ALBUMIN SERPL-MCNC: 3.7 G/DL (ref 3.5–5.2)
ALBUMIN/GLOB SERPL: 1 G/DL
ALP SERPL-CCNC: 112 U/L (ref 39–117)
ALT SERPL W P-5'-P-CCNC: 23 U/L (ref 1–41)
ANION GAP SERPL CALCULATED.3IONS-SCNC: 11 MMOL/L (ref 5–15)
AST SERPL-CCNC: 36 U/L (ref 1–40)
B PARAPERT DNA SPEC QL NAA+PROBE: NOT DETECTED
B PERT DNA SPEC QL NAA+PROBE: NOT DETECTED
BACTERIA UR QL AUTO: ABNORMAL /HPF
BASOPHILS # BLD AUTO: 0.01 10*3/MM3 (ref 0–0.2)
BASOPHILS NFR BLD AUTO: 0.2 % (ref 0–1.5)
BILIRUB SERPL-MCNC: 0.3 MG/DL (ref 0.2–1.2)
BILIRUB UR QL STRIP: NEGATIVE
BUN BLD-MCNC: 22 MG/DL (ref 8–23)
BUN/CREAT SERPL: 17.9 (ref 7–25)
C PNEUM DNA NPH QL NAA+NON-PROBE: NOT DETECTED
CALCIUM SPEC-SCNC: 9 MG/DL (ref 8.6–10.5)
CHLORIDE SERPL-SCNC: 104 MMOL/L (ref 98–107)
CLARITY UR: CLEAR
CO2 SERPL-SCNC: 26 MMOL/L (ref 22–29)
COLOR UR: YELLOW
CREAT BLD-MCNC: 1.23 MG/DL (ref 0.76–1.27)
D-LACTATE SERPL-SCNC: 1 MMOL/L (ref 0.5–2)
DEPRECATED RDW RBC AUTO: 46.3 FL (ref 37–54)
EOSINOPHIL # BLD AUTO: 0.07 10*3/MM3 (ref 0–0.4)
EOSINOPHIL NFR BLD AUTO: 1.5 % (ref 0.3–6.2)
ERYTHROCYTE [DISTWIDTH] IN BLOOD BY AUTOMATED COUNT: 14.1 % (ref 12.3–15.4)
FLUAV H1 2009 PAND RNA NPH QL NAA+PROBE: NOT DETECTED
FLUAV H1 HA GENE NPH QL NAA+PROBE: NOT DETECTED
FLUAV H3 RNA NPH QL NAA+PROBE: NOT DETECTED
FLUAV SUBTYP SPEC NAA+PROBE: NOT DETECTED
FLUBV RNA ISLT QL NAA+PROBE: NOT DETECTED
FOLATE BLD-MCNC: 382 NG/ML
FOLATE RBC-MCNC: 1313 NG/ML
GFR SERPL CREATININE-BSD FRML MDRD: 58 ML/MIN/1.73
GLOBULIN UR ELPH-MCNC: 3.6 GM/DL
GLUCOSE BLD-MCNC: 98 MG/DL (ref 65–99)
GLUCOSE UR STRIP-MCNC: NEGATIVE MG/DL
HADV DNA SPEC NAA+PROBE: NOT DETECTED
HCOV 229E RNA SPEC QL NAA+PROBE: NOT DETECTED
HCOV HKU1 RNA SPEC QL NAA+PROBE: NOT DETECTED
HCOV NL63 RNA SPEC QL NAA+PROBE: NOT DETECTED
HCOV OC43 RNA SPEC QL NAA+PROBE: NOT DETECTED
HCT VFR BLD AUTO: 29.1 % (ref 37.5–51)
HCT VFR BLD AUTO: 31.2 % (ref 37.5–51)
HGB BLD-MCNC: 10.1 G/DL (ref 13–17.7)
HGB UR QL STRIP.AUTO: ABNORMAL
HMPV RNA NPH QL NAA+NON-PROBE: NOT DETECTED
HOLD SPECIMEN: NORMAL
HOLD SPECIMEN: NORMAL
HPIV1 RNA SPEC QL NAA+PROBE: NOT DETECTED
HPIV2 RNA SPEC QL NAA+PROBE: NOT DETECTED
HPIV3 RNA NPH QL NAA+PROBE: NOT DETECTED
HPIV4 P GENE NPH QL NAA+PROBE: NOT DETECTED
HYALINE CASTS UR QL AUTO: ABNORMAL /LPF
IMM GRANULOCYTES # BLD AUTO: 0.01 10*3/MM3 (ref 0–0.05)
IMM GRANULOCYTES NFR BLD AUTO: 0.2 % (ref 0–0.5)
INR PPP: 3.04 (ref 0.9–1.1)
INR PPP: 3.76 (ref 0.9–1.1)
KETONES UR QL STRIP: NEGATIVE
LEUKOCYTE ESTERASE UR QL STRIP.AUTO: ABNORMAL
LYMPHOCYTES # BLD AUTO: 0.58 10*3/MM3 (ref 0.7–3.1)
LYMPHOCYTES NFR BLD AUTO: 12.3 % (ref 19.6–45.3)
M PNEUMO IGG SER IA-ACNC: NOT DETECTED
MCH RBC QN AUTO: 29.1 PG (ref 26.6–33)
MCHC RBC AUTO-ENTMCNC: 32.4 G/DL (ref 31.5–35.7)
MCV RBC AUTO: 89.9 FL (ref 79–97)
MONOCYTES # BLD AUTO: 0.23 10*3/MM3 (ref 0.1–0.9)
MONOCYTES NFR BLD AUTO: 4.9 % (ref 5–12)
NEUTROPHILS # BLD AUTO: 3.81 10*3/MM3 (ref 1.7–7)
NEUTROPHILS NFR BLD AUTO: 80.9 % (ref 42.7–76)
NITRITE UR QL STRIP: NEGATIVE
NRBC BLD AUTO-RTO: 0 /100 WBC (ref 0–0.2)
PH UR STRIP.AUTO: 6.5 [PH] (ref 5–8)
PLATELET # BLD AUTO: 198 10*3/MM3 (ref 140–450)
PMV BLD AUTO: 8.9 FL (ref 6–12)
POTASSIUM BLD-SCNC: 4.8 MMOL/L (ref 3.5–5.2)
PROT SERPL-MCNC: 7.3 G/DL (ref 6–8.5)
PROT UR QL STRIP: NEGATIVE
PROTHROMBIN TIME: 31.2 SECONDS (ref 11.7–14.2)
PROTHROMBIN TIME: 36.9 SECONDS (ref 11.7–14.2)
RBC # BLD AUTO: 3.47 10*6/MM3 (ref 4.14–5.8)
RBC # UR: ABNORMAL /HPF
REF LAB TEST METHOD: ABNORMAL
RHINOVIRUS RNA SPEC NAA+PROBE: NOT DETECTED
RSV RNA NPH QL NAA+NON-PROBE: NOT DETECTED
SODIUM BLD-SCNC: 141 MMOL/L (ref 136–145)
SP GR UR STRIP: 1.01 (ref 1–1.03)
SQUAMOUS #/AREA URNS HPF: ABNORMAL /HPF
UROBILINOGEN UR QL STRIP: ABNORMAL
WBC NRBC COR # BLD: 4.71 10*3/MM3 (ref 3.4–10.8)
WBC UR QL AUTO: ABNORMAL /HPF
WHOLE BLOOD HOLD SPECIMEN: NORMAL

## 2020-02-10 PROCEDURE — 99285 EMERGENCY DEPT VISIT HI MDM: CPT

## 2020-02-10 PROCEDURE — 80053 COMPREHEN METABOLIC PANEL: CPT | Performed by: EMERGENCY MEDICINE

## 2020-02-10 PROCEDURE — 94799 UNLISTED PULMONARY SVC/PX: CPT

## 2020-02-10 PROCEDURE — 25010000002 AZITHROMYCIN PER 500 MG: Performed by: EMERGENCY MEDICINE

## 2020-02-10 PROCEDURE — 85025 COMPLETE CBC W/AUTO DIFF WBC: CPT | Performed by: EMERGENCY MEDICINE

## 2020-02-10 PROCEDURE — 81001 URINALYSIS AUTO W/SCOPE: CPT | Performed by: EMERGENCY MEDICINE

## 2020-02-10 PROCEDURE — 83605 ASSAY OF LACTIC ACID: CPT | Performed by: EMERGENCY MEDICINE

## 2020-02-10 PROCEDURE — 0100U HC BIOFIRE FILMARRAY RESP PANEL 2: CPT | Performed by: EMERGENCY MEDICINE

## 2020-02-10 PROCEDURE — 25010000002 CEFTRIAXONE PER 250 MG: Performed by: INTERNAL MEDICINE

## 2020-02-10 PROCEDURE — 85610 PROTHROMBIN TIME: CPT | Performed by: INTERNAL MEDICINE

## 2020-02-10 PROCEDURE — 36415 COLL VENOUS BLD VENIPUNCTURE: CPT

## 2020-02-10 PROCEDURE — 25010000002 CEFTRIAXONE PER 250 MG: Performed by: EMERGENCY MEDICINE

## 2020-02-10 PROCEDURE — 85610 PROTHROMBIN TIME: CPT | Performed by: EMERGENCY MEDICINE

## 2020-02-10 PROCEDURE — 71046 X-RAY EXAM CHEST 2 VIEWS: CPT

## 2020-02-10 PROCEDURE — 87040 BLOOD CULTURE FOR BACTERIA: CPT | Performed by: EMERGENCY MEDICINE

## 2020-02-10 RX ORDER — DOCUSATE SODIUM 100 MG/1
100 CAPSULE, LIQUID FILLED ORAL 2 TIMES DAILY PRN
Status: DISCONTINUED | OUTPATIENT
Start: 2020-02-10 | End: 2020-02-18

## 2020-02-10 RX ORDER — ONDANSETRON 2 MG/ML
4 INJECTION INTRAMUSCULAR; INTRAVENOUS EVERY 6 HOURS PRN
Status: DISCONTINUED | OUTPATIENT
Start: 2020-02-10 | End: 2020-02-19 | Stop reason: HOSPADM

## 2020-02-10 RX ORDER — ACETAMINOPHEN 500 MG
1000 TABLET ORAL ONCE
Status: COMPLETED | OUTPATIENT
Start: 2020-02-10 | End: 2020-02-10

## 2020-02-10 RX ORDER — CALCIUM CARBONATE 200(500)MG
2 TABLET,CHEWABLE ORAL 2 TIMES DAILY PRN
Status: DISCONTINUED | OUTPATIENT
Start: 2020-02-10 | End: 2020-02-19 | Stop reason: HOSPADM

## 2020-02-10 RX ORDER — POTASSIUM CHLORIDE 750 MG/1
10 CAPSULE, EXTENDED RELEASE ORAL DAILY
Status: DISCONTINUED | OUTPATIENT
Start: 2020-02-11 | End: 2020-02-19 | Stop reason: HOSPADM

## 2020-02-10 RX ORDER — FERROUS SULFATE 325(65) MG
325 TABLET ORAL
Status: DISCONTINUED | OUTPATIENT
Start: 2020-02-11 | End: 2020-02-16

## 2020-02-10 RX ORDER — ACETAMINOPHEN 325 MG/1
650 TABLET ORAL EVERY 4 HOURS PRN
Status: DISCONTINUED | OUTPATIENT
Start: 2020-02-10 | End: 2020-02-19 | Stop reason: HOSPADM

## 2020-02-10 RX ORDER — WARFARIN SODIUM 2.5 MG/1
2.5 TABLET ORAL
Status: DISCONTINUED | OUTPATIENT
Start: 2020-02-10 | End: 2020-02-10 | Stop reason: DRUGHIGH

## 2020-02-10 RX ORDER — PRAMIPEXOLE DIHYDROCHLORIDE 0.5 MG/1
0.5 TABLET ORAL 2 TIMES DAILY
Status: DISCONTINUED | OUTPATIENT
Start: 2020-02-11 | End: 2020-02-19 | Stop reason: HOSPADM

## 2020-02-10 RX ORDER — PAROXETINE HYDROCHLORIDE 20 MG/1
40 TABLET, FILM COATED ORAL EVERY MORNING
Status: DISCONTINUED | OUTPATIENT
Start: 2020-02-11 | End: 2020-02-19 | Stop reason: HOSPADM

## 2020-02-10 RX ORDER — NALOXONE HCL 0.4 MG/ML
0.4 VIAL (ML) INJECTION
Status: DISCONTINUED | OUTPATIENT
Start: 2020-02-10 | End: 2020-02-13

## 2020-02-10 RX ORDER — CEFTRIAXONE SODIUM 1 G/50ML
1 INJECTION, SOLUTION INTRAVENOUS EVERY 24 HOURS
Status: COMPLETED | OUTPATIENT
Start: 2020-02-10 | End: 2020-02-11

## 2020-02-10 RX ORDER — ALBUTEROL SULFATE 2.5 MG/3ML
2.5 SOLUTION RESPIRATORY (INHALATION) EVERY 4 HOURS PRN
Status: DISCONTINUED | OUTPATIENT
Start: 2020-02-10 | End: 2020-02-18

## 2020-02-10 RX ORDER — TRAZODONE HYDROCHLORIDE 100 MG/1
100 TABLET ORAL NIGHTLY
Status: DISCONTINUED | OUTPATIENT
Start: 2020-02-10 | End: 2020-02-19 | Stop reason: HOSPADM

## 2020-02-10 RX ORDER — ACETAMINOPHEN 160 MG/5ML
650 SOLUTION ORAL EVERY 4 HOURS PRN
Status: DISCONTINUED | OUTPATIENT
Start: 2020-02-10 | End: 2020-02-19 | Stop reason: HOSPADM

## 2020-02-10 RX ORDER — IPRATROPIUM BROMIDE AND ALBUTEROL SULFATE 2.5; .5 MG/3ML; MG/3ML
3 SOLUTION RESPIRATORY (INHALATION)
Status: DISCONTINUED | OUTPATIENT
Start: 2020-02-10 | End: 2020-02-11

## 2020-02-10 RX ORDER — CEFTRIAXONE SODIUM 1 G/50ML
1 INJECTION, SOLUTION INTRAVENOUS ONCE
Status: COMPLETED | OUTPATIENT
Start: 2020-02-10 | End: 2020-02-10

## 2020-02-10 RX ORDER — GUAIFENESIN 600 MG/1
1200 TABLET, EXTENDED RELEASE ORAL EVERY 12 HOURS
Status: DISCONTINUED | OUTPATIENT
Start: 2020-02-10 | End: 2020-02-19 | Stop reason: HOSPADM

## 2020-02-10 RX ORDER — FUROSEMIDE 20 MG/1
20 TABLET ORAL DAILY
Status: DISCONTINUED | OUTPATIENT
Start: 2020-02-11 | End: 2020-02-19 | Stop reason: HOSPADM

## 2020-02-10 RX ORDER — CEFTRIAXONE SODIUM 2 G/50ML
2 INJECTION, SOLUTION INTRAVENOUS EVERY 24 HOURS
Status: DISCONTINUED | OUTPATIENT
Start: 2020-02-11 | End: 2020-02-12

## 2020-02-10 RX ORDER — ALBUTEROL SULFATE 2.5 MG/3ML
2.5 SOLUTION RESPIRATORY (INHALATION) ONCE AS NEEDED
Status: DISCONTINUED | OUTPATIENT
Start: 2020-02-10 | End: 2020-02-19 | Stop reason: HOSPADM

## 2020-02-10 RX ORDER — TAMSULOSIN HYDROCHLORIDE 0.4 MG/1
0.4 CAPSULE ORAL NIGHTLY
Status: DISCONTINUED | OUTPATIENT
Start: 2020-02-10 | End: 2020-02-19 | Stop reason: HOSPADM

## 2020-02-10 RX ORDER — BISACODYL 10 MG
10 SUPPOSITORY, RECTAL RECTAL DAILY PRN
Status: DISCONTINUED | OUTPATIENT
Start: 2020-02-10 | End: 2020-02-19 | Stop reason: HOSPADM

## 2020-02-10 RX ORDER — ACETAMINOPHEN 650 MG/1
650 SUPPOSITORY RECTAL EVERY 4 HOURS PRN
Status: DISCONTINUED | OUTPATIENT
Start: 2020-02-10 | End: 2020-02-19 | Stop reason: HOSPADM

## 2020-02-10 RX ORDER — MUSCLE RUB CREAM 100; 150 MG/G; MG/G
CREAM TOPICAL
Status: DISCONTINUED | OUTPATIENT
Start: 2020-02-10 | End: 2020-02-19 | Stop reason: HOSPADM

## 2020-02-10 RX ORDER — SODIUM CHLORIDE 0.9 % (FLUSH) 0.9 %
10 SYRINGE (ML) INJECTION AS NEEDED
Status: DISCONTINUED | OUTPATIENT
Start: 2020-02-10 | End: 2020-02-19 | Stop reason: HOSPADM

## 2020-02-10 RX ORDER — UREA 10 %
1 LOTION (ML) TOPICAL NIGHTLY PRN
Status: DISCONTINUED | OUTPATIENT
Start: 2020-02-10 | End: 2020-02-19 | Stop reason: HOSPADM

## 2020-02-10 RX ORDER — NITROGLYCERIN 0.4 MG/1
0.4 TABLET SUBLINGUAL
Status: DISCONTINUED | OUTPATIENT
Start: 2020-02-10 | End: 2020-02-19 | Stop reason: HOSPADM

## 2020-02-10 RX ORDER — ONDANSETRON 4 MG/1
4 TABLET, FILM COATED ORAL EVERY 6 HOURS PRN
Status: DISCONTINUED | OUTPATIENT
Start: 2020-02-10 | End: 2020-02-19 | Stop reason: HOSPADM

## 2020-02-10 RX ORDER — MORPHINE SULFATE 2 MG/ML
2 INJECTION, SOLUTION INTRAMUSCULAR; INTRAVENOUS EVERY 4 HOURS PRN
Status: DISCONTINUED | OUTPATIENT
Start: 2020-02-10 | End: 2020-02-13

## 2020-02-10 RX ADMIN — CEFTRIAXONE SODIUM 1 G: 1 INJECTION, SOLUTION INTRAVENOUS at 15:32

## 2020-02-10 RX ADMIN — TAMSULOSIN HYDROCHLORIDE 0.4 MG: 0.4 CAPSULE ORAL at 23:56

## 2020-02-10 RX ADMIN — CEFTRIAXONE SODIUM 1 G: 1 INJECTION, SOLUTION INTRAVENOUS at 23:57

## 2020-02-10 RX ADMIN — IPRATROPIUM BROMIDE AND ALBUTEROL SULFATE 3 ML: 2.5; .5 SOLUTION RESPIRATORY (INHALATION) at 21:23

## 2020-02-10 RX ADMIN — IPRATROPIUM BROMIDE AND ALBUTEROL SULFATE 3 ML: 2.5; .5 SOLUTION RESPIRATORY (INHALATION) at 23:41

## 2020-02-10 RX ADMIN — GUAIFENESIN 1200 MG: 600 TABLET, EXTENDED RELEASE ORAL at 23:56

## 2020-02-10 RX ADMIN — ACETAMINOPHEN 1000 MG: 500 TABLET, FILM COATED ORAL at 13:27

## 2020-02-10 RX ADMIN — AZITHROMYCIN MONOHYDRATE 500 MG: 500 INJECTION, POWDER, LYOPHILIZED, FOR SOLUTION INTRAVENOUS at 16:05

## 2020-02-10 RX ADMIN — TRAZODONE HYDROCHLORIDE 100 MG: 100 TABLET ORAL at 23:56

## 2020-02-10 NOTE — TELEPHONE ENCOUNTER
----- Message from Brandin Bolton MD sent at 2/10/2020  7:14 AM EST -----  Regarding: FW: creatinine and ast  Kylah, have him come in for BMP and BNP labs this Wednesday. (Dx CHF)  ----- Message -----  From: George Phelan II, MD  Sent: 2/7/2020   3:31 PM EST  To: Brandin Bolton MD  Subject: creatinine and ast                               Hi Dr. Bolton,    This is Marcelo Phelan (hem onc).  On labs to evaluate ongoing worsened anemia, CMP today revealed a creatinine of 1.48 from 1.01 1/16/2020.  Also, AST 52 compared to 11 on 1/15/2020.    His Hb was at a similar level in January so I doubt these are the reasons for the ongoing worsened hemoglobin.  However, I wanted to bring these to your attention.    Thanks very much.  It is nice to work with you.    Marcelo.

## 2020-02-10 NOTE — ED NOTES
Pt c/o productive cough, chills, body aches and soa that started this am. Pt is currently being treated for  Uti. Pt is tachypneic and warm to the touch. Breath sounds diminished right lower lobe anteriorly     Yara Singh, RN  02/10/20 [id number]

## 2020-02-10 NOTE — ED PROVIDER NOTES
EMERGENCY DEPARTMENT ENCOUNTER    Room Number:  16/16  PCP: Brandin Bolton MD  Historian: patient  History Limited By: nothing      HPI  Chief Complaint: vomiting  Context: Jose Hurtado is a 70 y.o. male with a history of esophageal cancer s/p esophagectomy who presents to the ED c/o vomiting that started this morning. Patient also complains of SOA, cough (yesterday), rhinorrhea, congestion, sore throat and chills. His BLE are chronically swollen. Patient denies chest pain, diarrhea, black stool, abdominal pain, fever, rash and all other complaints at this time. Patient is not a smoker. Patient notes a history of HTN. Patient does not wear oxygen at home. Patient's last chemo treatment was >5 years ago.     Location: GI  Radiation: n/a  Character: vomiting  Duration: this morning  Severity: moderate  Progression: unchanged  Aggravating Factors: none  Alleviating Factors: none        PAST MEDICAL HISTORY  Active Ambulatory Problems     Diagnosis Date Noted   • Adenocarcinoma of esophagus (CMS/[facility]) 02/11/2016   • Adenomatous polyp of colon 02/11/2016   • Malignant neoplasm of prostate (CMS/[facility]) 02/11/2016   • RLS (restless legs syndrome) 02/11/2016   • Depression 01/26/2017   • Hypertension 04/12/2018   • Anti-phospholipid syndrome (CMS/[facility]) 05/10/2018   • Anemia 04/18/2019   • Insomnia due to other mental disorder 06/06/2019   • Peripheral polyneuropathy 06/06/2019   • B12 deficiency 06/07/2019   • Postsurgical malabsorption 10/25/2019   • Shortness of breath 01/14/2020   • Long-term (current) use of anticoagulants, INR goal 2.0-3.0 01/14/2020   • Lymphedema 01/14/2020     Resolved Ambulatory Problems     Diagnosis Date Noted   • Dysphagia 02/11/2016   • Duodenal ulcer 02/11/2016   • Decreased body weight 02/11/2016   • Cough    • Hyperkalemia 02/15/2016   • History of deep vein thrombosis 03/22/2016   • DVT (deep venous thrombosis) (CMS/[facility]) 08/24/2017   • Other hyperlipidemia 01/30/2018     Past Medical History:    Diagnosis Date   • Bladder disorder    • Community acquired pneumonia    • Deep venous thrombosis (CMS/HCC) 2006, 2008   • Esophageal carcinoma (CMS/HCC) 12/31/2014   • Hemorrhoids    • HH (hiatus hernia)    • History of atrial fibrillation 2015   • History of kidney stones    • History of nephrolithiasis    • History of pancreatitis    • History of radiation therapy    • Restless legs syndrome    • Squamous carcinoma          PAST SURGICAL HISTORY  Past Surgical History:   Procedure Laterality Date   • APPENDECTOMY  1950   • BRONCHOSCOPY      (Diagnostic)   • CATARACT EXTRACTION Bilateral 2014   • COLONOSCOPY  12/15/2014    Complete / Description: EH, IH, torts, stool, follow-up colonoscopy due in 5 years.   • COLONOSCOPY N/A 6/13/2017    non-thrombosed external hemorrhoids, normal examined ileum, IH   • COLONOSCOPY N/A 2/26/2019    Procedure: COLONOSCOPY with Cold Polypectomy;  Surgeon: Mulugeta Millan MD;  Location: [facility] ENDOSCOPY;  Service: Gastroenterology   • CYSTOSCOPY W/ LASER LITHOTRIPSY     • ENDOSCOPY N/A 6/13/2017    Procedure: ESOPHAGOGASTRODUODENOSCOPY WITH COLD BIOPSY;  Surgeon: Lake Gonzalez MD;  Location: [facility] ENDOSCOPY;  Service:    • ESOPHAGECTOMY      April 2015, stage IIB esophageal carcinoma, sub-total resection.   • ESOPHAGECTOMY      Esophagectomy Subtotal Andrea Joe Procedure   • EXCISION LESION  08/2012    Removal of Squamous Cell CA on Head   • HAMMER TOE REPAIR  09/2014    Hammertoe Operation (Each Toe), 10/2014   • HAMMER TOE REPAIR Left 10/3/2017    Procedure: Left second third and fourth distal interphalangeal joint resection with flexor tenotomy;  Surgeon: Mulugeta Lira MD;  Location: [facility] OR [facility];  Service:    • HERNIA REPAIR      incisional   • JEJUNOSTOMY      Laparoscopic   • JEJUNOSTOMY      tube removal    • KNEE SURGERY Bilateral 1967, 1973, 1981   • PATELLA SURGERY Left     removed   • PILONIDAL CYST / SINUS EXCISION     • IL TOTAL KNEE ARTHROPLASTY  Right 3/26/2018    Procedure: TOTAL KNEE ARTHROPLASTY;  Surgeon: Renny Solis MD;  Location: [facility] OR;  Service: Orthopedics   • PROSTATECTOMY     • PYLOROPLASTY     • SPINAL FUSION  1998    C 5,6   • TONSILLECTOMY     • TONSILLECTOMY     • UPPER GASTROINTESTINAL ENDOSCOPY  12/15/2014    LA Grade D esophagitis, Pardo's, HH, multiple duodenal ulcers   • VENTRAL/INCISIONAL HERNIA REPAIR N/A 2016    Procedure: VENTRAL/INCISIONAL HERNIA REPAIR, open, with mesh, and component separation;  Surgeon: Darren Rivas MD;  Location: [facility] MAIN OR;  Service:          FAMILY HISTORY  Family History   Problem Relation Age of Onset   • Cerebral aneurysm Mother         cerebral artery aneurysm ( age 56)   • Prostate cancer Brother 68   • Anxiety disorder Father    • Suicide Attempts Father          of suicide   • Cancer Father         bladder   • No Known Problems Brother    • Colon cancer Neg Hx    • Esophageal cancer Neg Hx    • Dementia Neg Hx          SOCIAL HISTORY  Social History     Socioeconomic History   • Marital status:      Spouse name: Lena   • Number of children: 0   • Years of education: College   • Highest education level: Not on file   Occupational History   • Occupation: Retired      Comment: Retired    Tobacco Use   • Smoking status: Former Smoker     Packs/day: 2.00     Years: 3.00     Pack years: 6.00     Types: Cigarettes     Last attempt to quit: 1974     Years since quittin.1   • Smokeless tobacco: Never Used   • Tobacco comment: no caffeine    Substance and Sexual Activity   • Alcohol use: Yes     Frequency: 2-3 times a week     Comment: 2-3 x per week   • Drug use: No   • Sexual activity: Defer   Social History Narrative    self-employed          ALLERGIES  Patient has no known allergies.        REVIEW OF SYSTEMS  Review of Systems   Constitutional: Positive for chills. Negative for activity change, appetite change  and fever.   HENT: Positive for congestion, rhinorrhea and sore throat.    Eyes: Negative.    Respiratory: Positive for cough and shortness of breath.    Cardiovascular: Positive for leg swelling (BLE). Negative for chest pain.   Gastrointestinal: Positive for vomiting. Negative for abdominal pain and diarrhea.   Endocrine: Negative.    Genitourinary: Negative for decreased urine volume and dysuria.   Musculoskeletal: Negative for neck pain.   Skin: Negative for rash and wound.   Allergic/Immunologic: Negative.    Neurological: Negative for weakness, numbness and headaches.   Hematological: Negative.    Psychiatric/Behavioral: Negative.    All other systems reviewed and are negative.           PHYSICAL EXAM  ED Triage Vitals   Temp Heart Rate Resp BP SpO2   02/10/20 1221 02/10/20 1220 02/10/20 1220 02/10/20 1220 02/10/20 1220   (!) 101.6 °F (38.7 °C) 86 22 175/84 97 %      Temp src Heart Rate Source Patient Position BP Location FiO2 (%)   02/10/20 1221 -- -- -- --   Tympanic           Physical Exam   Constitutional: He is oriented to person, place, and time. No distress.   HENT:   Head: Normocephalic and atraumatic.   Mouth/Throat: Mucous membranes are dry.   Dry oral mucosa.   Eyes: Pupils are equal, round, and reactive to light. EOM are normal.   Neck: Normal range of motion. Neck supple.   Cardiovascular: Normal rate, regular rhythm and normal heart sounds.   Pulmonary/Chest: Effort normal and breath sounds normal. No respiratory distress.   Abdominal: Soft. There is no tenderness. There is no rebound and no guarding.   Musculoskeletal: Normal range of motion. He exhibits no edema.   Lymphadenopathy:   Lymphedema to BLE.   Neurological: He is alert and oriented to person, place, and time. He has normal sensation and normal strength.   Skin: Skin is warm and dry.   Psychiatric: Mood and affect normal.   Nursing note and vitals reviewed.          LAB RESULTS  Recent Results (from the past 24 hour(s))    Comprehensive Metabolic Panel    Collection Time: 02/10/20 12:51 PM   Result Value Ref Range    Glucose 98 65 - 99 mg/dL    BUN 22 8 - 23 mg/dL    Creatinine 1.23 0.76 - 1.27 mg/dL    Sodium 141 136 - 145 mmol/L    Potassium 4.8 3.5 - 5.2 mmol/L    Chloride 104 98 - 107 mmol/L    CO2 26.0 22.0 - 29.0 mmol/L    Calcium 9.0 8.6 - 10.5 mg/dL    Total Protein 7.3 6.0 - 8.5 g/dL    Albumin 3.70 3.50 - 5.20 g/dL    ALT (SGPT) 23 1 - 41 U/L    AST (SGOT) 36 1 - 40 U/L    Alkaline Phosphatase 112 39 - 117 U/L    Total Bilirubin 0.3 0.2 - 1.2 mg/dL    eGFR Non African Amer 58 (L) >60 mL/min/1.73    Globulin 3.6 gm/dL    A/G Ratio 1.0 g/dL    BUN/Creatinine Ratio 17.9 7.0 - 25.0    Anion Gap 11.0 5.0 - 15.0 mmol/L   Lactic Acid, Plasma    Collection Time: 02/10/20 12:51 PM   Result Value Ref Range    Lactate 1.0 0.5 - 2.0 mmol/L   CBC Auto Differential    Collection Time: 02/10/20 12:51 PM   Result Value Ref Range    WBC 4.71 3.40 - 10.80 10*3/mm3    RBC 3.47 (L) 4.14 - 5.80 10*6/mm3    Hemoglobin 10.1 (L) 13.0 - 17.7 g/dL    Hematocrit 31.2 (L) 37.5 - 51.0 %    MCV 89.9 79.0 - 97.0 fL    MCH 29.1 26.6 - 33.0 pg    MCHC 32.4 31.5 - 35.7 g/dL    RDW 14.1 12.3 - 15.4 %    RDW-SD 46.3 37.0 - 54.0 fl    MPV 8.9 6.0 - 12.0 fL    Platelets 198 140 - 450 10*3/mm3    Neutrophil % 80.9 (H) 42.7 - 76.0 %    Lymphocyte % 12.3 (L) 19.6 - 45.3 %    Monocyte % 4.9 (L) 5.0 - 12.0 %    Eosinophil % 1.5 0.3 - 6.2 %    Basophil % 0.2 0.0 - 1.5 %    Immature Grans % 0.2 0.0 - 0.5 %    Neutrophils, Absolute 3.81 1.70 - 7.00 10*3/mm3    Lymphocytes, Absolute 0.58 (L) 0.70 - 3.10 10*3/mm3    Monocytes, Absolute 0.23 0.10 - 0.90 10*3/mm3    Eosinophils, Absolute 0.07 0.00 - 0.40 10*3/mm3    Basophils, Absolute 0.01 0.00 - 0.20 10*3/mm3    Immature Grans, Absolute 0.01 0.00 - 0.05 10*3/mm3    nRBC 0.0 0.0 - 0.2 /100 WBC   Green Top (Gel)    Collection Time: 02/10/20 12:51 PM   Result Value Ref Range    Extra Tube Hold for add-ons.    Lavender Top     Collection Time: 02/10/20 12:51 PM   Result Value Ref Range    Extra Tube hold for add-on    Gold Top - SST    Collection Time: 02/10/20 12:51 PM   Result Value Ref Range    Extra Tube Hold for add-ons.    Protime-INR    Collection Time: 02/10/20 12:51 PM   Result Value Ref Range    Protime 31.2 (H) 11.7 - 14.2 Seconds    INR 3.04 (H) 0.90 - 1.10   Respiratory Panel, PCR - Swab, Nasopharynx    Collection Time: 02/10/20  1:00 PM   Result Value Ref Range    ADENOVIRUS, PCR Not Detected Not Detected    Coronavirus 229E Not Detected Not Detected    Coronavirus HKU1 Not Detected Not Detected    Coronavirus NL63 Not Detected Not Detected    Coronavirus OC43 Not Detected Not Detected    Human Metapneumovirus Not Detected Not Detected    Human Rhinovirus/Enterovirus Not Detected Not Detected    Influenza B PCR Not Detected Not Detected    Parainfluenza Virus 1 Not Detected Not Detected    Parainfluenza Virus 2 Not Detected Not Detected    Parainfluenza Virus 3 Not Detected Not Detected    Parainfluenza Virus 4 Not Detected Not Detected    Bordetella pertussis pcr Not Detected Not Detected    Influenza A H1 2009 PCR Not Detected Not Detected    Chlamydophila pneumoniae PCR Not Detected Not Detected    Mycoplasma pneumo by PCR Not Detected Not Detected    Influenza A PCR Not Detected Not Detected    Influenza A H3 Not Detected Not Detected    Influenza A H1 Not Detected Not Detected    RSV, PCR Not Detected Not Detected    Bordetella parapertussis PCR Not Detected Not Detected   Urinalysis With Microscopic If Indicated (No Culture) - Urine, Clean Catch    Collection Time: 02/10/20  1:52 PM   Result Value Ref Range    Color, UA Yellow Yellow, Straw    Appearance, UA Clear Clear    pH, UA 6.5 5.0 - 8.0    Specific Gravity, UA 1.014 1.005 - 1.030    Glucose, UA Negative Negative    Ketones, UA Negative Negative    Bilirubin, UA Negative Negative    Blood, UA Small (1+) (A) Negative    Protein, UA Negative Negative    Leuk  Esterase, UA Small (1+) (A) Negative    Nitrite, UA Negative Negative    Urobilinogen, UA 0.2 E.U./dL 0.2 - 1.0 E.U./dL   Urinalysis, Microscopic Only - Urine, Clean Catch    Collection Time: 02/10/20  1:52 PM   Result Value Ref Range    RBC, UA 0-2 None Seen, 0-2 /HPF    WBC, UA 6-12 (A) None Seen, 0-2 /HPF    Bacteria, UA None Seen None Seen /HPF    Squamous Epithelial Cells, UA 0-2 None Seen, 0-2 /HPF    Hyaline Casts, UA None Seen None Seen /LPF    Methodology Manual Light Microscopy        Ordered the above labs and reviewed the results.        RADIOLOGY  XR Chest 2 View   Final Result   Persistent right pleural effusion. Increased opacity at the   right lower lung suggesting atelectasis/infiltrate, follow-up   recommended.       This report was finalized on 2/10/2020 1:48 PM by Dr. Sumanth Boyer M.D.               Ordered the above noted radiological studies. Reviewed by me in PACS.        PROCEDURES  Procedures            MEDICATIONS GIVEN IN ER  Medications   sodium chloride 0.9 % flush 10 mL (has no administration in time range)   cefTRIAXone (ROCEPHIN) IVPB 1 g (has no administration in time range)   azithromycin (ZITHROMAX) 500 mg 0.9% NaCl (Add-vantage) 250 mL (has no administration in time range)   acetaminophen (TYLENOL) tablet 1,000 mg (1,000 mg Oral Given 2/10/20 1327)         PROGRESS AND CONSULTS  ED Course as of Feb 10 1525   Mon Feb 10, 2020   1514 3:14 PM  Patient here for fevers.  Has been on levaquin.  Now with rigors and fevers here.  CXR shows worsening infiltrate on the right despite antibiotics.  Subjectively short of breath but room air O2 sats low 90s.      [SL]   1523 3:23 PM  Discussed with Dr. Barba and will admit.  Rocephin and azithromycin.    [SL]      ED Course User Index  [SL] Ghulam Fuentes MD     4444. Labs and CXR ordered.     1500  States he feels short of breath without oxygen.  Given rocephin and azithromycin since was on levaquin.      MEDICAL DECISION  MAKING      MDM  Number of Diagnoses or Management Options  Pneumonia of right lower lobe due to infectious organism (CMS/HCC):   Rigors:      Amount and/or Complexity of Data Reviewed  Clinical lab tests: ordered and reviewed (Normal lactate)  Tests in the radiology section of CPT®: ordered and reviewed (Right effusion with infiltrate)  Discuss the patient with other providers: (Dr. Barba)    Patient Progress  Patient progress: stable       DIAGNOSIS  Final diagnoses:   Pneumonia of right lower lobe due to infectious organism (CMS/HCC)   Rigors       DISPOSITION  admit    Latest Documented Vital Signs:  As of 3:25 PM  BP- 119/61 HR- 84 Temp- 100.2 °F (37.9 °C) (Tympanic) O2 sat- 96%    --  Documentation assistance provided by eunice Brannon for Dr. Diallo Fuentes MD.  Information recorded by the scribe was done at my direction and has been verified and validated by me.     Lorraine Brannon  02/10/20 [id number]       Ghulam Fuentes MD  02/10/20 4829

## 2020-02-10 NOTE — ED NOTES
Nursing report ED to floor  Jose Hurtado  70 y.o.  male    HPI (triage note):   Chief Complaint   Patient presents with   • Weakness - Generalized   • Flu Symptoms       Admitting doctor:   Fiona Barba MD    Admitting diagnosis:   The primary encounter diagnosis was Pneumonia of right lower lobe due to infectious organism (CMS/[facility]). A diagnosis of Rigors was also pertinent to this visit.    Code status:   Current Code Status     Date Active Code Status Order ID Comments User Context       Prior          Allergies:   Patient has no known allergies.    Weight:       02/10/20  1304   Weight: 93 kg (205 lb)       Most recent vitals:   Vitals:    02/10/20 1452 02/10/20 1508 02/10/20 1630 02/10/20 1641   BP:   107/56    Pulse: 84  78 73   Resp:       Temp:  100.2 °F (37.9 °C)     TempSrc:  Tympanic     SpO2: 96%  96% 97%   Weight:       Height:           Active LDAs/IV Access:   Lines, Drains & Airways    Active LDAs     Name:   Placement date:   Placement time:   Site:   Days:    Peripheral IV 02/10/20 Left Antecubital   02/10/20    --    Antecubital   less than 1                Labs (abnormal labs have a star):   Labs Reviewed   COMPREHENSIVE METABOLIC PANEL - Abnormal; Notable for the following components:       Result Value    eGFR Non  Amer 58 (*)     All other components within normal limits    Narrative:     GFR Normal >60  Chronic Kidney Disease <60  Kidney Failure <15     CBC WITH AUTO DIFFERENTIAL - Abnormal; Notable for the following components:    RBC 3.47 (*)     Hemoglobin 10.1 (*)     Hematocrit 31.2 (*)     Neutrophil % 80.9 (*)     Lymphocyte % 12.3 (*)     Monocyte % 4.9 (*)     Lymphocytes, Absolute 0.58 (*)     All other components within normal limits   PROTIME-INR - Abnormal; Notable for the following components:    Protime 31.2 (*)     INR 3.04 (*)     All other components within normal limits   URINALYSIS W/ MICROSCOPIC IF INDICATED (NO CULTURE) - Abnormal; Notable for the  following components:    Blood, UA Small (1+) (*)     Leuk Esterase, UA Small (1+) (*)     All other components within normal limits   URINALYSIS, MICROSCOPIC ONLY - Abnormal; Notable for the following components:    WBC, UA 6-12 (*)     All other components within normal limits   RESPIRATORY PANEL, PCR - Normal   LACTIC ACID, PLASMA - Normal   BLOOD CULTURE   BLOOD CULTURE   RAINBOW DRAW    Narrative:     The following orders were created for panel order [place] Draw.  Procedure                               Abnormality         Status                     ---------                               -----------         ------                     Light Blue Top[[id number]]                                                              Green Top (Gel)[[id number]]                                  Final result               Lavender Top[[id number]]                                     Final result               Gold Top - SST[[id number]]                                   Final result                 Please view results for these tests on the individual orders.   CBC AND DIFFERENTIAL    Narrative:     The following orders were created for panel order CBC & Differential.  Procedure                               Abnormality         Status                     ---------                               -----------         ------                     CBC Auto Differential[[id number]]        Abnormal            Final result                 Please view results for these tests on the individual orders.   GREEN TOP   LAVENDER TOP   GOLD TOP - SST       EKG:   No orders to display       Meds given in ED:   Medications   sodium chloride 0.9 % flush 10 mL (has no administration in time range)   acetaminophen (TYLENOL) tablet 1,000 mg (1,000 mg Oral Given 2/10/20 1327)   cefTRIAXone (ROCEPHIN) IVPB 1 g (0 g Intravenous Stopped 2/10/20 1558)   azithromycin (ZITHROMAX) 500 mg 0.9% NaCl (Add-vantage) 250 mL (500 mg Intravenous New Bag 2/10/20 1605)        Imaging results:  Xr Chest 2 View    Result Date: 2/10/2020  Persistent right pleural effusion. Increased opacity at the right lower lung suggesting atelectasis/infiltrate, follow-up recommended.  This report was finalized on 2/10/2020 1:48 PM by Dr. Sumanth Boyer M.D.        Ambulatory status:   - bedrest    Social issues:   Social History     Socioeconomic History   • Marital status:      Spouse name: Lena   • Number of children: 0   • Years of education: College   • Highest education level: Not on file   Occupational History   • Occupation: Retired      Comment: Retired    Tobacco Use   • Smoking status: Former Smoker     Packs/day: 2.00     Years: 3.00     Pack years: 6.00     Types: Cigarettes     Last attempt to quit: 1974     Years since quittin.1   • Smokeless tobacco: Never Used   • Tobacco comment: no caffeine    Substance and Sexual Activity   • Alcohol use: Yes     Frequency: 2-3 times a week     Comment: 2-3 x per week   • Drug use: No   • Sexual activity: Defer   Social History Narrative    self-employed         King, Varun, RN  02/10/20 5807

## 2020-02-11 ENCOUNTER — RESULTS ENCOUNTER (OUTPATIENT)
Dept: INTERNAL MEDICINE | Age: 70
End: 2020-02-11

## 2020-02-11 ENCOUNTER — APPOINTMENT (OUTPATIENT)
Dept: GENERAL RADIOLOGY | Facility: HOSPITAL | Age: 70
End: 2020-02-11

## 2020-02-11 DIAGNOSIS — I50.9 CONGESTIVE HEART FAILURE, UNSPECIFIED HF CHRONICITY, UNSPECIFIED HEART FAILURE TYPE (HCC): ICD-10-CM

## 2020-02-11 PROBLEM — R77.8 ELEVATED TROPONIN: Status: ACTIVE | Noted: 2020-02-11

## 2020-02-11 PROBLEM — R79.89 ELEVATED TROPONIN: Status: ACTIVE | Noted: 2020-02-11

## 2020-02-11 LAB
ANION GAP SERPL CALCULATED.3IONS-SCNC: 9.5 MMOL/L (ref 5–15)
BASOPHILS # BLD AUTO: 0.01 10*3/MM3 (ref 0–0.2)
BASOPHILS NFR BLD AUTO: 0.1 % (ref 0–1.5)
BUN BLD-MCNC: 20 MG/DL (ref 8–23)
BUN/CREAT SERPL: 19 (ref 7–25)
CALCIUM SPEC-SCNC: 8.3 MG/DL (ref 8.6–10.5)
CHLORIDE SERPL-SCNC: 104 MMOL/L (ref 98–107)
CO2 SERPL-SCNC: 25.5 MMOL/L (ref 22–29)
CREAT BLD-MCNC: 1.05 MG/DL (ref 0.76–1.27)
DEPRECATED RDW RBC AUTO: 44.6 FL (ref 37–54)
EOSINOPHIL # BLD AUTO: 0.18 10*3/MM3 (ref 0–0.4)
EOSINOPHIL NFR BLD AUTO: 2.5 % (ref 0.3–6.2)
ERYTHROCYTE [DISTWIDTH] IN BLOOD BY AUTOMATED COUNT: 14.1 % (ref 12.3–15.4)
FERRITIN SERPL-MCNC: 571 NG/ML (ref 30–400)
GFR SERPL CREATININE-BSD FRML MDRD: 69 ML/MIN/1.73
GLUCOSE BLD-MCNC: 117 MG/DL (ref 65–99)
HCT VFR BLD AUTO: 23.6 % (ref 37.5–51)
HGB BLD-MCNC: 7.8 G/DL (ref 13–17.7)
IMM GRANULOCYTES # BLD AUTO: 0.02 10*3/MM3 (ref 0–0.05)
IMM GRANULOCYTES NFR BLD AUTO: 0.3 % (ref 0–0.5)
INR PPP: 4.22 (ref 0.9–1.1)
IRON 24H UR-MRATE: 17 MCG/DL (ref 59–158)
IRON SATN MFR SERPL: 7 % (ref 20–50)
LYMPHOCYTES # BLD AUTO: 1.13 10*3/MM3 (ref 0.7–3.1)
LYMPHOCYTES NFR BLD AUTO: 15.7 % (ref 19.6–45.3)
MAGNESIUM SERPL-MCNC: 2 MG/DL (ref 1.6–2.4)
MCH RBC QN AUTO: 29.2 PG (ref 26.6–33)
MCHC RBC AUTO-ENTMCNC: 33.1 G/DL (ref 31.5–35.7)
MCV RBC AUTO: 88.4 FL (ref 79–97)
MONOCYTES # BLD AUTO: 0.33 10*3/MM3 (ref 0.1–0.9)
MONOCYTES NFR BLD AUTO: 4.6 % (ref 5–12)
NEUTROPHILS # BLD AUTO: 5.54 10*3/MM3 (ref 1.7–7)
NEUTROPHILS NFR BLD AUTO: 76.8 % (ref 42.7–76)
NRBC BLD AUTO-RTO: 0 /100 WBC (ref 0–0.2)
PHOSPHATE SERPL-MCNC: 2.3 MG/DL (ref 2.5–4.5)
PLATELET # BLD AUTO: 155 10*3/MM3 (ref 140–450)
PMV BLD AUTO: 9.3 FL (ref 6–12)
POTASSIUM BLD-SCNC: 4.1 MMOL/L (ref 3.5–5.2)
PROCALCITONIN SERPL-MCNC: 1.37 NG/ML (ref 0.1–0.25)
PROTHROMBIN TIME: 40.5 SECONDS (ref 11.7–14.2)
RBC # BLD AUTO: 2.67 10*6/MM3 (ref 4.14–5.8)
RETICS # AUTO: 0.04 10*6/MM3 (ref 0.02–0.13)
RETICS/RBC NFR AUTO: 1.38 % (ref 0.7–1.9)
SODIUM BLD-SCNC: 139 MMOL/L (ref 136–145)
TIBC SERPL-MCNC: 247 MCG/DL (ref 298–536)
TRANSFERRIN SERPL-MCNC: 166 MG/DL (ref 200–360)
TROPONIN T SERPL-MCNC: 0.04 NG/ML (ref 0–0.03)
TROPONIN T SERPL-MCNC: 0.05 NG/ML (ref 0–0.03)
WBC NRBC COR # BLD: 7.21 10*3/MM3 (ref 3.4–10.8)

## 2020-02-11 PROCEDURE — 84145 PROCALCITONIN (PCT): CPT | Performed by: INTERNAL MEDICINE

## 2020-02-11 PROCEDURE — 85045 AUTOMATED RETICULOCYTE COUNT: CPT | Performed by: INTERNAL MEDICINE

## 2020-02-11 PROCEDURE — 25010000002 AZITHROMYCIN PER 500 MG: Performed by: INTERNAL MEDICINE

## 2020-02-11 PROCEDURE — 97165 OT EVAL LOW COMPLEX 30 MIN: CPT

## 2020-02-11 PROCEDURE — 82728 ASSAY OF FERRITIN: CPT | Performed by: INTERNAL MEDICINE

## 2020-02-11 PROCEDURE — 94799 UNLISTED PULMONARY SVC/PX: CPT

## 2020-02-11 PROCEDURE — 74220 X-RAY XM ESOPHAGUS 1CNTRST: CPT

## 2020-02-11 PROCEDURE — 83540 ASSAY OF IRON: CPT | Performed by: INTERNAL MEDICINE

## 2020-02-11 PROCEDURE — 97162 PT EVAL MOD COMPLEX 30 MIN: CPT

## 2020-02-11 PROCEDURE — 80048 BASIC METABOLIC PNL TOTAL CA: CPT | Performed by: INTERNAL MEDICINE

## 2020-02-11 PROCEDURE — 83735 ASSAY OF MAGNESIUM: CPT | Performed by: INTERNAL MEDICINE

## 2020-02-11 PROCEDURE — 97140 MANUAL THERAPY 1/> REGIONS: CPT

## 2020-02-11 PROCEDURE — 25010000003 CEFTRIAXONE PER 250 MG: Performed by: INTERNAL MEDICINE

## 2020-02-11 PROCEDURE — 84484 ASSAY OF TROPONIN QUANT: CPT | Performed by: INTERNAL MEDICINE

## 2020-02-11 PROCEDURE — 84100 ASSAY OF PHOSPHORUS: CPT | Performed by: INTERNAL MEDICINE

## 2020-02-11 PROCEDURE — 97535 SELF CARE MNGMENT TRAINING: CPT

## 2020-02-11 PROCEDURE — 85610 PROTHROMBIN TIME: CPT | Performed by: INTERNAL MEDICINE

## 2020-02-11 PROCEDURE — 85025 COMPLETE CBC W/AUTO DIFF WBC: CPT | Performed by: INTERNAL MEDICINE

## 2020-02-11 PROCEDURE — 84466 ASSAY OF TRANSFERRIN: CPT | Performed by: INTERNAL MEDICINE

## 2020-02-11 RX ORDER — IPRATROPIUM BROMIDE AND ALBUTEROL SULFATE 2.5; .5 MG/3ML; MG/3ML
3 SOLUTION RESPIRATORY (INHALATION)
Status: DISCONTINUED | OUTPATIENT
Start: 2020-02-11 | End: 2020-02-18

## 2020-02-11 RX ADMIN — METRONIDAZOLE 500 MG: 500 INJECTION, SOLUTION INTRAVENOUS at 02:38

## 2020-02-11 RX ADMIN — BARIUM SULFATE 183 ML: 960 POWDER, FOR SUSPENSION ORAL at 14:25

## 2020-02-11 RX ADMIN — PRAMIPEXOLE DIHYDROCHLORIDE 0.5 MG: 0.5 TABLET ORAL at 08:12

## 2020-02-11 RX ADMIN — PRAMIPEXOLE DIHYDROCHLORIDE 0.5 MG: 0.5 TABLET ORAL at 20:56

## 2020-02-11 RX ADMIN — IPRATROPIUM BROMIDE AND ALBUTEROL SULFATE 3 ML: 2.5; .5 SOLUTION RESPIRATORY (INHALATION) at 03:42

## 2020-02-11 RX ADMIN — SODIUM CHLORIDE, PRESERVATIVE FREE 10 ML: 5 INJECTION INTRAVENOUS at 08:35

## 2020-02-11 RX ADMIN — METRONIDAZOLE 500 MG: 500 INJECTION, SOLUTION INTRAVENOUS at 18:43

## 2020-02-11 RX ADMIN — FERROUS SULFATE TAB 325 MG (65 MG ELEMENTAL FE) 325 MG: 325 (65 FE) TAB at 08:12

## 2020-02-11 RX ADMIN — METRONIDAZOLE 500 MG: 500 INJECTION, SOLUTION INTRAVENOUS at 08:12

## 2020-02-11 RX ADMIN — TAMSULOSIN HYDROCHLORIDE 0.4 MG: 0.4 CAPSULE ORAL at 20:56

## 2020-02-11 RX ADMIN — GUAIFENESIN 1200 MG: 600 TABLET, EXTENDED RELEASE ORAL at 08:17

## 2020-02-11 RX ADMIN — PAROXETINE 40 MG: 20 TABLET, FILM COATED ORAL at 08:11

## 2020-02-11 RX ADMIN — IPRATROPIUM BROMIDE AND ALBUTEROL SULFATE 3 ML: 2.5; .5 SOLUTION RESPIRATORY (INHALATION) at 07:18

## 2020-02-11 RX ADMIN — IPRATROPIUM BROMIDE AND ALBUTEROL SULFATE 3 ML: 2.5; .5 SOLUTION RESPIRATORY (INHALATION) at 19:55

## 2020-02-11 RX ADMIN — TRAZODONE HYDROCHLORIDE 100 MG: 100 TABLET ORAL at 20:56

## 2020-02-11 RX ADMIN — CEFTRIAXONE SODIUM 2 G: 2 INJECTION, SOLUTION INTRAVENOUS at 16:06

## 2020-02-11 RX ADMIN — POTASSIUM CHLORIDE 10 MEQ: 750 CAPSULE, EXTENDED RELEASE ORAL at 08:12

## 2020-02-11 RX ADMIN — FUROSEMIDE 20 MG: 20 TABLET ORAL at 08:12

## 2020-02-11 RX ADMIN — IPRATROPIUM BROMIDE AND ALBUTEROL SULFATE 3 ML: 2.5; .5 SOLUTION RESPIRATORY (INHALATION) at 16:34

## 2020-02-11 RX ADMIN — SODIUM CHLORIDE, PRESERVATIVE FREE 10 ML: 5 INJECTION INTRAVENOUS at 20:56

## 2020-02-11 RX ADMIN — AZITHROMYCIN DIHYDRATE 500 MG: 500 INJECTION, POWDER, LYOPHILIZED, FOR SOLUTION INTRAVENOUS at 16:05

## 2020-02-11 RX ADMIN — GUAIFENESIN 1200 MG: 600 TABLET, EXTENDED RELEASE ORAL at 20:56

## 2020-02-11 RX ADMIN — IPRATROPIUM BROMIDE AND ALBUTEROL SULFATE 3 ML: 2.5; .5 SOLUTION RESPIRATORY (INHALATION) at 10:46

## 2020-02-11 NOTE — CONSULTS
Adult Nutrition  Assessment/PES    Patient Name:  Jose Hurtado  YOB: 1950  MRN: [id number]  Admit Date:  2/10/2020    Assessment Date:  2/11/2020    Comments:  Consult d/t MST score of 2 per nurse admission screen.  Admitted with PNA.  Weakness, SOB.  Here in January with CHF and pleural effusion.  No reports of decreased appetite.  Eating well, 75% x 1 meal per chart PO data.  No weight loss noted per chart weight history.    RD to continue to follow as needed.    Reason for Assessment     Row Name 02/11/20 0941          Reason for Assessment    Reason For Assessment  nurse/nurse practitioner consult;identified at risk by screening criteria     Diagnosis  other (see comments);cardiac disease;pulmonary disease;cancer diagnosis/related complications antiphospholipid syndrome, Afib, CHF, pleural effusion, esophageal CA, DVT; adm with PNA     Identified At Risk by Screening Criteria  MST SCORE 2+;unintentional loss of 10 lbs or more in the past 2 mos         Nutrition/Diet History     Row Name 02/11/20 0941          Nutrition/Diet History    Typical Food/Fluid Intake  no decreased appetite per nurse admission screen; 75% x 1 meal per chart PO data         Anthropometrics     Row Name 02/11/20 0942 02/11/20 0427       Anthropometrics    Weight  --  93 kg (205 lb 1.6 oz)       Admit Weight    Admit Weight  -- 205# 2/11  --       Usual Body Weight (UBW)    Weight Loss Time Frame  does not appear to have had weight gain per chart weight history  --       Body Mass Index (BMI)    BMI Assessment  BMI 18.5-24.9: normal  --        Labs/Tests/Procedures/Meds     Row Name 02/11/20 0942          Labs/Procedures/Meds    Lab Results Reviewed  reviewed, pertinent     Lab Results Comments  Gluc, Ca, Phos, Hgb, Hct        Diagnostic Tests/Procedures    Diagnostic Test/Procedure Reviewed  reviewed, pertinent        Medications    Pertinent Medications Reviewed  reviewed, pertinent     Pertinent Medications Comments   zithromax, FeSO4, lasix, KCl         Physical Findings     Row Name 02/11/20 0943          Physical Findings    Skin  -- B=17, intact         Estimated/Assessed Needs     Row Name 02/11/20 0943          Calculation Measurements    Weight Used For Calculations  93 kg (205 lb 0.4 oz)        Estimated/Assessed Needs       KCAL/KG    KCAL/KG  25 Kcal/Kg (kcal)     25 Kcal/Kg (kcal)  2325        Protein Requirements    Weight Used For Protein Calculations  93 kg (205 lb 0.4 oz)     Est Protein Requirement Amount (gms/kg)  1.2 gm protein     Estimated Protein Requirements (gms/day)  111.6        Fluid Requirements    Estimated Fluid Requirements (mL/day)  2325         Nutrition Prescription Ordered     Row Name 02/11/20 0944          Nutrition Prescription PO    Current PO Diet  Regular     Common Modifiers  Cardiac;Low Sodium     Low Sodium Details  2,000 mg Sodium         Evaluation of Received Nutrient/Fluid Intake     Row Name 02/11/20 0944          PO Evaluation    Number of Meals  1     % PO Intake  75         Problem/Interventions:  Problem 1     Row Name 02/11/20 0944          Nutrition Diagnoses Problem 1    Problem 1  Nutrition Appropriate for Condition at this Time     Etiology (related to)  MNT for Treatment/Condition     Signs/Symptoms (evidenced by)  PO Intake     Percent (%) intake recorded  75 %     Over number of meals  1         Intervention Goal     Row Name 02/11/20 0945          Intervention Goal    General  Maintain nutrition;Reduce/improve symptoms;Disease management/therapy     PO  Maintain intake     Weight  No significant weight loss         Nutrition Intervention     Row Name 02/11/20 0946          Nutrition Intervention    RD/Tech Action  Follow Tx progress;Care plan reviewd;Await begin PO         Education/Evaluation     Row Name 02/11/20 0946          Education    Education  Will Instruct as appropriate        Monitor/Evaluation    Monitor  Per protocol;PO intake;Pertinent  labs;Weight;Symptoms           Electronically signed by:  Luda Puente RD  02/11/20 9:46 AM

## 2020-02-11 NOTE — PLAN OF CARE
Problem: Patient Care Overview  Goal: Plan of Care Review  Flowsheets (Taken 2/11/2020 1257)  Plan of Care Reviewed With: patient  Note:   OT: Pt. Is O x 4, AROM and strength WFLs with (B)UE.  Pt is (I) with bed mobility and sit to stand just requires SBA and extra time for safety.  Pt.'s LEs have mild edema, (L)LE is larger than (R) due to old DVT, pt. States this has been this way for a very long time. Pt. Also stated that he was seen in the [facility] several years ago for his LE edema.  Therapist applied Lymph wrap to (B)LE from his toes to his knees.  Pt. stated his legs felt better wrapped than unwrapped. Written instruction for LE wraps are in the pt's room.  OT will wrap the LEs Mon - Fri and RN to wrap on Sat & Sun.  Pt. Will benefit from LE wraps while here in the hospital.  Pt. Could also benefit from Out Patient [facility] Clinic referral if he continues to need this service at D/C.

## 2020-02-11 NOTE — THERAPY EVALUATION
Patient Name: Jose Hurtado  : 1950    MRN: [id number]                              Today's Date: 2020       Admit Date: 2/10/2020    Visit Dx:     ICD-10-CM ICD-9-CM   1. Pneumonia of right lower lobe due to infectious organism (CMS/HCC) J18.1 486   2. Rigors R68.89 780.99     Patient Active Problem List   Diagnosis   • Adenocarcinoma of esophagus (CMS/HCC)   • Adenomatous polyp of colon   • Malignant neoplasm of prostate (CMS/HCC)   • RLS (restless legs syndrome)   • Depression   • Hypertension   • Anti-phospholipid syndrome (CMS/HCC)   • Anemia   • Insomnia due to other mental disorder   • Peripheral polyneuropathy   • B12 deficiency   • Postsurgical malabsorption   • Shortness of breath   • Long-term (current) use of anticoagulants, INR goal 2.0-3.0   • Lymphedema   • Pneumonia of right lower lobe due to infectious organism (CMS/HCC)   • Iron deficiency     Past Medical History:   Diagnosis Date   • Anemia    • Anti-phospholipid syndrome (CMS/HCC)     On lifelong anticoagulation therapy   • Bladder disorder     LEAKAGE   ON MED  wers pads   • Community acquired pneumonia     HISTORY OF IN    • Deep venous thrombosis (CMS/HCC) ,     Left lower extremity multiple   • Depression    • Esophageal carcinoma (CMS/HCC) 2014    had chemo and radiation prior surgery   • Hemorrhoids    • HH (hiatus hernia)    • History of atrial fibrillation 2015    ONE EPISODE WHILE HOSPITALIZED   • History of kidney stones    • History of nephrolithiasis    • History of pancreatitis     PT STATES MANY YEARS AGO   • History of radiation therapy    • Hypertension    • Long-term (current) use of anticoagulants, INR goal 2.0-3.0    • Lymphedema    • Malignant neoplasm of prostate (CMS/HCC)    • Other hyperlipidemia 2018 lipid panel risk 12.8%   • Restless legs syndrome    • Shortness of breath    • Squamous carcinoma     on the head     Past Surgical History:   Procedure Laterality Date    • APPENDECTOMY  1950   • BRONCHOSCOPY      (Diagnostic)   • CATARACT EXTRACTION Bilateral 2014   • COLONOSCOPY  12/15/2014    Complete / Description: EH, IH, torts, stool, follow-up colonoscopy due in 5 years.   • COLONOSCOPY N/A 6/13/2017    non-thrombosed external hemorrhoids, normal examined ileum, IH   • COLONOSCOPY N/A 2/26/2019    Procedure: COLONOSCOPY with Cold Polypectomy;  Surgeon: Mulugeta Millan MD;  Location: [facility] ENDOSCOPY;  Service: Gastroenterology   • CYSTOSCOPY W/ LASER LITHOTRIPSY     • ENDOSCOPY N/A 6/13/2017    Procedure: ESOPHAGOGASTRODUODENOSCOPY WITH COLD BIOPSY;  Surgeon: Lake Gonzalez MD;  Location: [facility] ENDOSCOPY;  Service:    • ESOPHAGECTOMY      April 2015, stage IIB esophageal carcinoma, sub-total resection.   • ESOPHAGECTOMY      Esophagectomy Subtotal [place] Procedure   • EXCISION LESION  08/2012    Removal of Squamous Cell CA on Head   • HAMMER TOE REPAIR  09/2014    Hammertoe Operation (Each Toe), 10/2014   • HAMMER TOE REPAIR Left 10/3/2017    Procedure: Left second third and fourth distal interphalangeal joint resection with flexor tenotomy;  Surgeon: Mulugeta Lira MD;  Location: [facility] OR OSC;  Service:    • HERNIA REPAIR      incisional   • JEJUNOSTOMY      Laparoscopic   • JEJUNOSTOMY      tube removal    • KNEE SURGERY Bilateral 1967, 1973, 1981   • PATELLA SURGERY Left     removed   • PILONIDAL CYST / SINUS EXCISION     • MT TOTAL KNEE ARTHROPLASTY Right 3/26/2018    Procedure: TOTAL KNEE ARTHROPLASTY;  Surgeon: Renny Solis MD;  Location: [facility] MAIN OR;  Service: Orthopedics   • PROSTATECTOMY  2010   • PYLOROPLASTY     • SPINAL FUSION  02/1998    C 5,6   • TONSILLECTOMY  1955   • TONSILLECTOMY     • UPPER GASTROINTESTINAL ENDOSCOPY  12/15/2014    LA Grade D esophagitis, Pardo's, HH, multiple duodenal ulcers   • VENTRAL/INCISIONAL HERNIA REPAIR N/A 4/14/2016    Procedure: VENTRAL/INCISIONAL HERNIA REPAIR, open, with mesh, and component  separation;  Surgeon: Darren Rivas MD;  Location: [facility] OR;  Service:      General Information     Row Name 02/11/20 0957          PT Evaluation Time/Intention    Document Type  evaluation  -AE     Mode of Treatment  physical therapy  -AE     Row Name 02/11/20 0957          General Information    Patient Profile Reviewed?  yes  -AE     Prior Level of Function  independent:  -AE     Row Name 02/11/20 0957          Relationship/Environment    Lives With  spouse  -AE     Row Name 02/11/20 0957          Resource/Environmental Concerns    Current Living Arrangements  home/apartment/condo  -AE     Row Name 02/11/20 0957          Home Main Entrance    Number of Stairs, Main Entrance  none  -AE     Row Name 02/11/20 0957          Stairs Within Home, Primary    Number of Stairs, Within Home, Primary  none  -AE     Row Name 02/11/20 0957          Cognitive Assessment/Intervention- PT/OT    Orientation Status (Cognition)  oriented x 3  -AE     Row Name 02/11/20 0957          Safety Issues, Functional Mobility    Impairments Affecting Function (Mobility)  strength;shortness of breath  -AE       User Key  (r) = Recorded By, (t) = Taken By, (c) = Cosigned By    Initials Name Provider Type    AE Bailee Godfrey PT Physical Therapist        Mobility     Row Name 02/11/20 0959          Bed Mobility Assessment/Treatment    Bed Mobility Assessment/Treatment  bed mobility (all) activities  -AE     [place] Level (Bed Mobility)  conditional independence  -AE     Row Name 02/11/20 0959          Transfer Assessment/Treatment    Comment (Transfers)  SBA for all transfers with FWW  -AE     Row Name 02/11/20 0959          Bed-Chair Transfer    Bed-Chair [place] (Transfers)  stand by assist  -AE     Assistive Device (Bed-Chair Transfers)  walker, front-wheeled  -AE     Row Name 02/11/20 0959          Sit-Stand Transfer    Sit-Stand [place] (Transfers)  stand by assist  -AE     Assistive Device (Sit-Stand  Transfers)  walker, front-wheeled  -AE     Row Name 02/11/20 0959          Gait/Stairs Assessment/Training    Gait/Stairs Assessment/Training  gait/ambulation independence;gait/ambulation assistive device  -AE     [place] (Gait)  stand by assist  -AE     Assistive Device (Gait)  walker, front-wheeled  -AE     Distance in Feet (Gait)  140 ft; one standing rest break ~70 ft due to SOB  -AE     Pattern (Gait)  step-through  -AE     Deviations/Abnormal Patterns (Gait)  jf decreased;stride length decreased  -AE     Bilateral Gait Deviations  forward flexed posture  -AE       User Key  (r) = Recorded By, (t) = Taken By, (c) = Cosigned By    Initials Name Provider Type    AE Bailee Godfrey PT Physical Therapist        Obj/Interventions     Row Name 02/11/20 0959          General ROM    GENERAL ROM COMMENTS  BLE WFL  -AE     Row Name 02/11/20 0959          MMT (Manual Muscle Testing)    General MMT Comments  BLE grossly 4/5  -AE       User Key  (r) = Recorded By, (t) = Taken By, (c) = Cosigned By    Initials Name Provider Type    AE Bailee Godfrey, PT Physical Therapist        Goals/Plan     Row Name 02/11/20 1000          Gait Training Goal 1 (PT)    Activity/Assistive Device (Gait Training Goal 1, PT)  gait (walking locomotion)  -AE     [place] (Gait Training Goal 1, PT)  supervision required  -AE     Distance (Gait Goal 1, PT)  150 ft with single UE support only  -AE     Time Frame (Gait Training Goal 1, PT)  1 week  -AE     Progress/Outcome (Gait Training Goal 1, PT)  continuing progress toward goal  -AE       User Key  (r) = Recorded By, (t) = Taken By, (c) = Cosigned By    Initials Name Provider Type    AE Bailee Godfrey, PT Physical Therapist        Clinical Impression     Row Name 02/11/20 1000          Pain Assessment    Additional Documentation  Pain Scale: Numbers Pre/Post-Treatment (Group)  -AE     Row Name 02/11/20 1000          Pain Scale: Numbers Pre/Post-Treatment    Pain  Scale: Numbers, Pretreatment  0/10 - no pain  -AE     Pain Scale: Numbers, Post-Treatment  0/10 - no pain  -AE     Row Name 02/11/20 1000          Physical Therapy Clinical Impression    Criteria for Skilled Interventions Met (PT Clinical Impression)  yes;treatment indicated  -AE     Rehab Potential (PT Clinical Summary)  good, to achieve stated therapy goals  -AE     Row Name 02/11/20 1000          Positioning and Restraints    Pre-Treatment Position  in bed  -AE     Post Treatment Position  bed  -AE     In Bed  supine;call light within reach;encouraged to call for assist;exit alarm on  -AE       User Key  (r) = Recorded By, (t) = Taken By, (c) = Cosigned By    Initials Name Provider Type    Bailee Chaudhary PT Physical Therapist        Outcome Measures     Row Name 02/11/20 1001          How much help from another person do you currently need...    Turning from your back to your side while in flat bed without using bedrails?  4  -AE     Moving from lying on back to sitting on the side of a flat bed without bedrails?  3  -AE     Moving to and from a bed to a chair (including a wheelchair)?  4  -AE     Standing up from a chair using your arms (e.g., wheelchair, bedside chair)?  4  -AE     Climbing 3-5 steps with a railing?  3  -AE     To walk in hospital room?  3  -AE     AM-PAC 6 Clicks Score (PT)  21  -AE     Row Name 02/11/20 1001          Functional Assessment    Outcome Measure Options  AM-PAC 6 Clicks Basic Mobility (PT)  -AE       User Key  (r) = Recorded By, (t) = Taken By, (c) = Cosigned By    Initials Name Provider Type    Bailee Chaudhary PT Physical Therapist          PT Recommendation and Plan  Planned Therapy Interventions (PT Eval): balance training, bed mobility training, gait training, home exercise program, patient/family education, neuromuscular re-education, motor coordination training, postural re-education, ROM (range of motion), stair training, strengthening, stretching, transfer  training  Outcome Summary/Treatment Plan (PT)  Anticipated Discharge Disposition (PT): home, home with assist     Time Calculation:   PT Charges     Row Name 02/11/20 1005             Time Calculation    Start Time  0845  -AE      Stop Time  0915  -AE      Time Calculation (min)  30 min  -AE      PT Received On  02/11/20  -AE      PT - Next Appointment  02/12/20  -AE      PT Goal Re-Cert Due Date  02/18/20  -AE         Time Calculation- PT    Total Timed Code Minutes- PT  0 minute(s)  -AE        User Key  (r) = Recorded By, (t) = Taken By, (c) = Cosigned By    Initials Name Provider Type    AE Bailee Godfrey, PT Physical Therapist        Therapy Charges for Today     Code Description Service Date Service Provider Modifiers Qty    66886017898 HC PT EVAL MOD COMPLEXITY 2 2/11/2020 Bailee Godfrey, PT GP 1          PT G-Codes  Outcome Measure Options: AM-PAC 6 Clicks Basic Mobility (PT)  AM-PAC 6 Clicks Score (PT): 21    Bailee Godfrey PT  2/11/2020

## 2020-02-11 NOTE — PROGRESS NOTES
Pharmacy Consult: Warfarin Dosing/ Monitoring    Jose Hurtado is a 70 y.o. male, estimated creatinine clearance is 83.7 mL/min (by C-G formula based on SCr of 1.05 mg/dL). weighing 93 kg (205 lb 1.6 oz).     has a past medical history of Anemia, Anti-phospholipid syndrome (CMS/HCC), Bladder disorder, Community acquired pneumonia, Deep venous thrombosis (CMS/HCC) (, ), Depression, Esophageal carcinoma (CMS/HCC) (2014), Hemorrhoids, HH (hiatus hernia), History of atrial fibrillation (), History of kidney stones, History of nephrolithiasis, History of pancreatitis, History of radiation therapy, Hypertension, Long-term (current) use of anticoagulants, INR goal 2.0-3.0, Lymphedema, Malignant neoplasm of prostate (CMS/[facility]), Other hyperlipidemia (2018), Restless legs syndrome, Shortness of breath, and Squamous carcinoma.    Social History     Tobacco Use    Smoking status: Former Smoker     Packs/day: 2.00     Years: 3.00     Pack years: 6.00     Types: Cigarettes     Last attempt to quit: 1974     Years since quittin.1    Smokeless tobacco: Never Used    Tobacco comment: no caffeine    Substance Use Topics    Alcohol use: Yes     Frequency: 2-3 times a week     Comment: 2-3 x per week    Drug use: No       Results from last 7 days   Lab Units 02/11/20  0439 02/10/20  2048 02/10/20  1251 20  1043 20  1122   INR  4.22* 3.76* 3.04*  --   --    HEMOGLOBIN g/dL 7.8*  --  10.1*  --  9.2*   HEMATOCRIT % 23.6*  --  31.2* 29.1* 28.9*   PLATELETS 10*3/mm3 155  --  198  --  187     Results from last 7 days   Lab Units 02/11/20  0439 02/10/20  1251 20  1043   SODIUM mmol/L 139 141 140   POTASSIUM mmol/L 4.1 4.8 4.7   CHLORIDE mmol/L 104 104 103   CO2 mmol/L 25.5 26.0 27.2   BUN mg/dL 20 22 26*   CREATININE mg/dL 1.05 1.23 1.48*   CALCIUM mg/dL 8.3* 9.0 9.0   BILIRUBIN mg/dL  --  0.3 0.2   ALK PHOS U/L  --  112 117   ALT (SGPT) U/L  --  23 25   AST (SGOT) U/L  --  36 52*   GLUCOSE mg/dL  117* 98 113*     Anticoagulation history: warfarin 2.5 mg every M/W/F/Sun; 5 mg all other days     Hospital Anticoagulation:  Consulting provider: Dr. Fiona Barba MD  Start date: Prior to admission   Indication: antiphospholipid syndrome   Target INR: 2-3  Expected duration: indefinite   Bridge Therapy: No                Date 2/10 2/11           INR 3.04 4.22           Warfarin dose HOLD HOLD             Potential drug interactions: paroxetine, metronidazole, ceftriaxone, azithromycin--potential increased anticoagulant effects of warfarin      Relevant nutrition status: regular diet, cardiac low sodium    Education complete: Yes  Date: 2/11/2020     Assessment/Plan:  Patient's INR is supratherapeutic at 4.22. Will HOLD on a dose this evening and follow-up with tomorrow's INR.  Daily INR ordered and will be assessed to guide regimen moving forward.      Pharmacy will continue to follow until discharge or discontinuation of warfarin.   Satish Lee RPH  2/11/2020

## 2020-02-11 NOTE — PROGRESS NOTES
"  PROGRESS NOTE  Patient Name: Jose Hurtado  Age/Sex: 70 y.o. male  : 1950  MRN: [id number]    Date of Admission: 2/10/2020  Date of Encounter Visit: 20   LOS: 1 day   Patient Care Team:  Brandin Bolton MD as PCP - General (Internal Medicine)  Code, George THORPE II, MD as Consulting Physician (Hematology and Oncology)  Jose Inman MD as Consulting Physician (Urology)  Mulugeta Millan MD as Consulting Physician (Gastroenterology)  Seth Randhawa MD as Consulting Physician (Ophthalmology)    Chief Complaint: Coughing spells    Hospital course: Had suspected aspiration pneumonia, his esophagram was negative for direct laryngeal aspiration but he did have some reflux, and he had some positive basal water in the stomach.  He is on room air, still complaining of the cough and the dyspnea    Interval History: As per above    REVIEW OF SYSTEMS:   CONSTITUTIONAL: no fever or chills  CARDIOVASCULAR: No chest pain, chest pressure or chest discomfort. No palpitations or edema.   RESPIRATORY: Shortness of breath due to coughing spells otherwise doing fine in between    GASTROINTESTINAL: No anorexia, nausea, vomiting or diarrhea. No abdominal pain or blood.   HEMATOLOGIC: No bleeding or bruising.     Ventilator/Non-Invasive Ventilation Settings (From admission, onward)    None            Vital Signs  Temp:  [97.2 °F (36.2 °C)-98.1 °F (36.7 °C)] 97.2 °F (36.2 °C)  Heart Rate:  [66-82] 76  Resp:  [18-20] 18  BP: (103-121)/(52-60) 114/60  SpO2:  [93 %-100 %] 98 %  on  Flow (L/min):  [1] 1 Device (Oxygen Therapy): room air    Intake/Output Summary (Last 24 hours) at 2020 1734  Last data filed at 2020 1700  Gross per 24 hour   Intake 170 ml   Output 3050 ml   Net -2880 ml     Flowsheet Rows      First Filed Value   Admission Height  195.6 cm (77\") Documented at 02/10/2020 1304   Admission Weight  93 kg (205 lb) Documented at 02/10/2020 1304        Body mass index is 24.32 kg/m².      02/10/20  1304 " 02/10/20  2029 02/11/20  0427   Weight: 93 kg (205 lb) 93.1 kg (205 lb 4.8 oz) 93 kg (205 lb 1.6 oz)       Physical Exam:  GEN:  No acute distress, alert, cooperative, well developed   EYES:   Sclera clear. No icterus. PERRL. Normal EOM  ENT:   External ears/nose normal, no oral lesions, no thrush, mucous membranes moist  NECK:  Supple, midline trachea, no JVD  LUNGS: Normal chest on inspection, minimal wheezing mainly upon coughing otherwise quiet breathing was normal, the breathing was nonlabored, no more audible rales.   CV:  Regular rhythm and rate. Normal S1/S2. No murmurs, gallops, or rubs noted.  ABD:  Soft, nontender and nondistended. Normal bowel sounds. No guarding  EXT:  Moves all extremities well. No cyanosis. No redness. positive for lower extremities edema.   Skin: dry, intact, no bleeding    Results Review:      Results from last 7 days   Lab Units 02/11/20  0439 02/10/20  1251 02/07/20  1043   SODIUM mmol/L 139 141 140   POTASSIUM mmol/L 4.1 4.8 4.7   CHLORIDE mmol/L 104 104 103   CO2 mmol/L 25.5 26.0 27.2   BUN mg/dL 20 22 26*   CREATININE mg/dL 1.05 1.23 1.48*   CALCIUM mg/dL 8.3* 9.0 9.0   AST (SGOT) U/L  --  36 52*   ALT (SGPT) U/L  --  23 25   ANION GAP mmol/L 9.5 11.0 9.8   ALBUMIN g/dL  --  3.70 3.50     Results from last 7 days   Lab Units 02/11/20  1149 02/11/20  0439   TROPONIN T ng/mL 0.039* 0.046*             Results from last 7 days   Lab Units 02/11/20  0439 02/10/20  1251 02/07/20  1043 02/05/20  1122   WBC 10*3/mm3 7.21 4.71  --  4.11   HEMOGLOBIN g/dL 7.8* 10.1*  --  9.2*   HEMATOCRIT % 23.6* 31.2* 29.1* 28.9*   PLATELETS 10*3/mm3 155 198  --  187   MCV fL 88.4 89.9  --  91.7   NEUTROPHIL % % 76.8* 80.9*  --  51.9   LYMPHOCYTE % % 15.7* 12.3*  --  31.1   MONOCYTES % % 4.6* 4.9*  --  10.7   EOSINOPHIL % % 2.5 1.5  --  5.1   BASOPHIL % % 0.1 0.2  --  0.7   IMM GRAN % % 0.3 0.2  --  0.5     Results from last 7 days   Lab Units 02/11/20  0439 02/10/20  2048 02/10/20  1251   INR  4.22*  3.76* 3.04*     Results from last 7 days   Lab Units 02/11/20  0439   MAGNESIUM mg/dL 2.0           Invalid input(s): LDLCALC          No results found for: POCGLU  Results from last 7 days   Lab Units 02/11/20  0028 02/10/20  1251   PROCALCITONIN ng/mL 1.37*  --    LACTATE mmol/L  --  1.0     Results from last 7 days   Lab Units 02/10/20  1531 02/10/20  1251   BLOODCX  No growth at 24 hours No growth at 24 hours     Results from last 7 days   Lab Units 02/10/20  1352   NITRITE UA  Negative   WBC UA /HPF 6-12*   BACTERIA UA /HPF None Seen   SQUAM EPITHEL UA /HPF 0-2     Results from last 7 days   Lab Units 02/10/20  1300   ADENOVIRUS DETECTION BY PCR  Not Detected   CORONAVIRUS 229E  Not Detected   CORONAVIRUS HKU1  Not Detected   CORONAVIRUS NL63  Not Detected   CORONAVIRUS OC43  Not Detected   HUMAN METAPNEUMOVIRUS  Not Detected   HUMAN RHINOVIRUS/ENTEROVIRUS  Not Detected   INFLUENZA B PCR  Not Detected   PARAINFLUENZA 1  Not Detected   PARAINFLUENZA VIRUS 2  Not Detected   PARAINFLUENZA VIRUS 3  Not Detected   PARAINFLUENZA VIRUS 4  Not Detected   BORDETELLA PERTUSSIS PCR  Not Detected   CHLAMYDOPHILA PNEUMONIAE PCR  Not Detected   MYCOPLAMA PNEUMO PCR  Not Detected   INFLUENZA A PCR  Not Detected   INFLUENZA A H3  Not Detected   INFLUENZA A H1  Not Detected   RSV, PCR  Not Detected           Imaging:   Imaging Results (All)     Procedure Component Value Units Date/Time    FL Esophagram Complete [[id number]] Collected:  02/11/20 1423     Updated:  02/11/20 1452    Narrative:       CLINICAL HISTORY: 70-year-old man dysphagia. Previous distal  esophagectomy and gastric pull-through with upper thoracic  esophagogastric anastomosis. Right pleural effusion and bilateral patchy  atelectasis or infiltrate, along with recent CT scan demonstration of  right pleural effusion, demonstrated on the CT chest study of  01/15/2020.     EXAM: BARIUM SWALLOW ESOPHAGRAM DATED 02/11/2020      FINDINGS: The preliminary PA erect chest  radiographs are compared to CT  chest multiplanar sections of 01/15/2020 and with erect AP and lateral  chest radiographs of 02/10/2020 at 1318 hours. Blunting of right  costophrenic angle, trace blunting of left costophrenic angle, opacity  at the medial right lower chest compatible with gastric pull-through,  and patchy bilateral perihilar to basilar atelectasis or infiltrates are  again demonstrated. Borderline to mild cardiomegaly and some aortic  uncoiling are again demonstrated. Multiple right upper rib deformities,  apparently chronic, are again demonstrated. No pneumothorax or acute  change in bony thorax is seen. Metallic plate and screws at the lower  cervical spine are demonstrated. Monitoring lead wires are present.     The patient was reportedly unsafe to stand unsupported, and the current  exam was performed with patient on the x-ray table in multiple  projections and with the table and patient variably horizontal to steep  reverse Trendelenburg orientation. The patient ingested thin barium  liquid mixture and water for the current exam. Rapid sequence imaging of  the neck in lateral and AP projection as the patient ingested thin  barium liquid mixture demonstrated laryngeal penetration without  aspiration. There is low normal AP diameter of mid to upper cervical  esophagus with metallic plate and screws at C6-C7 levels possibly  responsible for a trace of effacement of posterior aspect of cervical  esophagus. The transverse caliber of cervical esophagus is within normal  limits. The short remaining upper thoracic segment of esophagus has  normal distensibility. The esophagogastric anastomosis is at the level  of the manubrium and has luminal diameter of at least 1.9 cm or greater.  A large amount of food material appears to be present in the stomach. I  cannot confirm or exclude bezoar as a component of this food material.  With the patient in left posterior oblique to left lateral decubitus  position  there is most optimal demonstrated emptying of some of the  gastric barium from the intrathoracic stomach into the distal  subdiaphragmatic antrum, and from there promptly into and through the  duodenum. Also in this position, the patient appears at greatest  vulnerability to gastroesophageal reflux at the anastomosis. Modest  reflux into the remaining upper thoracic esophagus was observed even  with mild reverse Trendelenburg orientation of table and patient, with  the patient in left lateral decubitus position.     A total of 2 minutes 50 seconds fluoroscopy was used. A total of 2  digital overhead radiographs and 103 digital fluoroscopic spot image  radiographs were acquired.     CONCLUSION: Laryngeal penetration without aspiration. Large amount of  food material versus food material or bezoar in the stomach. Prompt  passage of some of the ingested barium through the esophagus, stomach  and duodenum, dependent on patient position and orientation as discussed  above. Gastroesophageal reflux at the upper thoracic esophagogastric  anastomosis was observed.     This report was finalized on 2/11/2020 2:49 PM by Dr. George Veras M.D.       XR Chest 2 View [[id number]] Collected:  02/10/20 1345     Updated:  02/10/20 1351    Narrative:       XR CHEST 2 VW-     HISTORY: Male who is 70 years-old,  short of breath, fever     TECHNIQUE: Frontal and lateral views of the chest     COMPARISON: 01/14/2020     FINDINGS: Heart size is normal. Aorta is tortuous. Pulmonary vasculature  is unremarkable. Right pleural effusion appears similar to prior exam.  Opacity at the right base appears increased suggesting  atelectasis/infiltrate. Gastric pull-through changes are apparent. No  pneumothorax. No acute osseous process.       Impression:       Persistent right pleural effusion. Increased opacity at the  right lower lung suggesting atelectasis/infiltrate, follow-up  recommended.     This report was finalized on 2/10/2020 1:48 PM by  Dr. Sumanth Boyer M.D.             I reviewed the patient's new clinical results.  I personally viewed and interpreted the patient's imaging results:        Medication Review:     azithromycin 500 mg Intravenous Q24H   cefTRIAXone 2 g Intravenous Q24H   ferrous sulfate 325 mg Oral QAM AC   furosemide 20 mg Oral Daily   guaiFENesin 1,200 mg Oral Q12H   ipratropium-albuterol 3 mL Nebulization 4x Daily - RT   metroNIDAZOLE 500 mg Intravenous Q8H   PARoxetine 40 mg Oral QAM   potassium chloride 10 mEq Oral Daily   pramipexole 0.5 mg Oral BID   tamsulosin 0.4 mg Oral Nightly   traZODone 100 mg Oral Nightly         Pharmacy to dose warfarin        ASSESSMENT:   1. Pneumonia, suspecting reflux aspiration t vomiting  2. COPD  3. Chronic right-sided effusion likely loculated  4. Restrictive lung disease, part of it postsurgical  5. Obstructive sleep apnea  6. Vomiting  7. Anemia  8. Abnormal esophagram was positive reflux and was questionable bezoar in the stomach    PLAN:  Antibiotic were modified for anaerobic coverage by adding Flagyl,Viral panel were negative, INR is sepsis over therapeutic and managed by the primary team.  The patient has some mild increase in the white blood cell count but it is still within normal range, the left shift was noted yesterday and seems to be trending down.  The procalcitonin is positive consistent with a bacterial infection and the antibiotic needs to be continued for the time being with further de-escalation likely in the morning   Esophagram was done on 12/11/2020, it was positive for laryngeal penetration without aspiration with large amount of thick material versus food material or bezoar in the stomach the passage time through the esophagus was prompt esophageal reflux was noted.  GI evaluation is recommended given the possibility of bezoar in the stomach.    Discussed with patient    Disposition:     Alfie Hoffman MD  02/11/20  5:34 PM          Dictated utilizing Dragon  dictation

## 2020-02-11 NOTE — PLAN OF CARE
Problem: Patient Care Overview  Goal: Plan of Care Review  Outcome: Ongoing (interventions implemented as appropriate)  Flowsheets (Taken 2/11/2020 9050 by Naye Espinosa RN)  Plan of Care Reviewed With: patient  Note:   Pt is a 69 yo M admitted for pneumonia. Pt reports PLOF as independent, works part time as a . Pt lives with [place] in [facility] with no steps to enter, intermittently uses SPC for mobility, does own FWW- denied any DC needs. Pt was supervision for bed mobility, SBA for transfers with FWW, gait training ~140 ft with FWW and single standing rest break half way due to SOB. Skilled PT needed to address SOB, functional weakness, and gait training to return to PLOF. DC recs include home. Pt does not want therapy at home instead would like to follow up with his personal training. Likely only needs 1-2 sessions of gait training (unable to perform today due to INR>4).

## 2020-02-11 NOTE — THERAPY EVALUATION
Acute Care - Occupational Therapy Initial Evaluation  [facility]     Patient Name: Jose Hurtado  : 1950  MRN: [id number]  Today's Date: 2020             Admit Date: 2/10/2020       ICD-10-CM ICD-9-CM   1. Pneumonia of right lower lobe due to infectious organism (CMS/HCC) J18.1 486   2. Rigors R68.89 780.99     Patient Active Problem List   Diagnosis   • Adenocarcinoma of esophagus (CMS/HCC)   • Adenomatous polyp of colon   • Malignant neoplasm of prostate (CMS/HCC)   • RLS (restless legs syndrome)   • Depression   • Hypertension   • Anti-phospholipid syndrome (CMS/HCC)   • Anemia   • Insomnia due to other mental disorder   • Peripheral polyneuropathy   • B12 deficiency   • Postsurgical malabsorption   • Shortness of breath   • Long-term (current) use of anticoagulants, INR goal 2.0-3.0   • Lymphedema   • Pneumonia of right lower lobe due to infectious organism (CMS/HCC)   • Iron deficiency   • Elevated troponin     Past Medical History:   Diagnosis Date   • Anemia    • Anti-phospholipid syndrome (CMS/HCC)     On lifelong anticoagulation therapy   • Bladder disorder     LEAKAGE   ON MED  wers pads   • Community acquired pneumonia     HISTORY OF IN    • Deep venous thrombosis (CMS/HCC) ,     Left lower extremity multiple   • Depression    • Esophageal carcinoma (CMS/HCC) 2014    had chemo and radiation prior surgery   • Hemorrhoids    • HH (hiatus hernia)    • History of atrial fibrillation 2015    ONE EPISODE WHILE HOSPITALIZED   • History of kidney stones    • History of nephrolithiasis    • History of pancreatitis     PT STATES MANY YEARS AGO   • History of radiation therapy    • Hypertension    • Long-term (current) use of anticoagulants, INR goal 2.0-3.0    • Lymphedema    • Malignant neoplasm of prostate (CMS/HCC)    • Other hyperlipidemia 2018 lipid panel risk 12.8%   • Restless legs syndrome    • Shortness of breath    • Squamous carcinoma     on the  head     Past Surgical History:   Procedure Laterality Date   • APPENDECTOMY  1950   • BRONCHOSCOPY      (Diagnostic)   • CATARACT EXTRACTION Bilateral 2014   • COLONOSCOPY  12/15/2014    Complete / Description: EH, IH, torts, stool, follow-up colonoscopy due in 5 years.   • COLONOSCOPY N/A 6/13/2017    non-thrombosed external hemorrhoids, normal examined ileum, IH   • COLONOSCOPY N/A 2/26/2019    Procedure: COLONOSCOPY with Cold Polypectomy;  Surgeon: Mulugeta Millan MD;  Location: [facility] ENDOSCOPY;  Service: Gastroenterology   • CYSTOSCOPY W/ LASER LITHOTRIPSY     • ENDOSCOPY N/A 6/13/2017    Procedure: ESOPHAGOGASTRODUODENOSCOPY WITH COLD BIOPSY;  Surgeon: Lake Gonzalez MD;  Location: [facility] ENDOSCOPY;  Service:    • ESOPHAGECTOMY      April 2015, stage IIB esophageal carcinoma, sub-total resection.   • ESOPHAGECTOMY      Esophagectomy Subtotal [place] Procedure   • EXCISION LESION  08/2012    Removal of Squamous Cell CA on Head   • HAMMER TOE REPAIR  09/2014    Hammertoe Operation (Each Toe), 10/2014   • HAMMER TOE REPAIR Left 10/3/2017    Procedure: Left second third and fourth distal interphalangeal joint resection with flexor tenotomy;  Surgeon: Mulugeta Lira MD;  Location: [facility] OR OSC;  Service:    • HERNIA REPAIR      incisional   • JEJUNOSTOMY      Laparoscopic   • JEJUNOSTOMY      tube removal    • KNEE SURGERY Bilateral 1967, 1973, 1981   • PATELLA SURGERY Left     removed   • PILONIDAL CYST / SINUS EXCISION     • TX TOTAL KNEE ARTHROPLASTY Right 3/26/2018    Procedure: TOTAL KNEE ARTHROPLASTY;  Surgeon: Renny Solis MD;  Location: [facility] MAIN OR;  Service: Orthopedics   • PROSTATECTOMY  2010   • PYLOROPLASTY     • SPINAL FUSION  02/1998    C 5,6   • TONSILLECTOMY  1955   • TONSILLECTOMY     • UPPER GASTROINTESTINAL ENDOSCOPY  12/15/2014    LA Grade D esophagitis, Pardo's, HH, multiple duodenal ulcers   • VENTRAL/INCISIONAL HERNIA REPAIR N/A 4/14/2016    Procedure:  VENTRAL/INCISIONAL HERNIA REPAIR, open, with mesh, and component separation;  Surgeon: Darren Rivas MD;  Location: [facility] MAIN OR;  Service:           OT ASSESSMENT FLOWSHEET (last 12 hours)      Occupational Therapy Evaluation     Row Name 02/11/20 1040                   OT Evaluation Time/Intention    Subjective Information  no complaints  -VS        Document Type  evaluation  -VS        Mode of Treatment  individual therapy;occupational therapy  -VS        Patient Effort  good  -VS        Symptoms Noted During/After Treatment  none  -VS        Comment  Evluation for both OT and Lymphedema   -VS           General Information    Patient Profile Reviewed?  yes  -VS        General Observations of Patient  Pt. was supine when the OT entered the room  -VS        Prior Level of Function  independent:  -VS        Equipment Currently Used at Home  cane, straight;grab bar;shower chair;walker, standard  -VS           Cognitive Assessment/Intervention- PT/OT    Orientation Status (Cognition)  oriented x 4  -VS        Follows Commands (Cognition)  follows multi-step commands  -VS        Safety Deficit (Cognitive)  -- pt. needs to wear non-slipper socks with lymph bandages   -VS           Bed Mobility Assessment/Treatment    Bed Mobility Assessment/Treatment  bed mobility (all) activities  -VS        [place] Level (Bed Mobility)  conditional independence  -VS        Comment (Bed Mobility)  Increased time to complete   -VS           Transfer Assessment/Treatment    Transfer Assessment/Treatment  sit-stand transfer;stand-sit transfer  -VS           Sit-Stand Transfer    Sit-Stand [place] (Transfers)  contact guard;stand by assist  -VS           Stand-Sit Transfer    Stand-Sit [place] (Transfers)  stand by assist  -VS           ADL Assessment/Intervention    BADL Assessment/Intervention  bathing;lower body dressing  -VS           Bathing Assessment/Intervention    Comment (Bathing)  Therapist bathed the  "LE toe/knee and applied lotion   -VS           Lower Body Dressing Assessment/Training    Lower Body Dressing Wallback Level  doff;don;socks;independent  -VS        Lower Body Dressing Position  edge of bed sitting  -VS           General ROM    GENERAL ROM COMMENTS  AROM WFL with (B)UEs  -VS           MMT (Manual Muscle Testing)    General MMT Comments  MMT with (B)UE WFLs   -VS           Positioning and Restraints    Pre-Treatment Position  in bed  -VS        Post Treatment Position  bed  -VS        In Bed  notified nsg;supine;call light within reach;encouraged to call for assist;exit alarm on  -VS           Pain Scale: Numbers Pre/Post-Treatment    Pain Scale: Numbers, Pretreatment  0/10 - no pain  -VS        Pain Scale: Numbers, Post-Treatment  0/10 - no pain  -VS           Edema Assessment & Management    Location (Edema)  lower extremity, left;lower extremity, right  -VS        Additional Documentation  Edema Symptoms/Interventions (Group);Location (Edema) (Row)  -VS           Lower Extremity Edema Measures, Left    Lower Extremity, Left (Edema)  mid-calf  -VS           Lower Extremity Edema Measures, Right    Lower Extremity, Right (Edema)  mid-calf  -VS           Edema Symptoms/Interventions    Description (Edema)  localized;pitting  -VS        Pitting Scale (Edema)  2-->mild (L)LE greater than (R) due to old DVT   -VS        Treatment Interventions (Edema)  compression bandaging, short stretch;compression wrapping/taping #9[place],#15artiflex,#8 & 2x10 [place] toe/knee  -VS           Plan of Care Review    Plan of Care Reviewed With  patient  -VS           Clinical Impression (OT)    OT Diagnosis  Increase LE edema whihc causes safety issues with mobility   -VS        Functional Level at Time of Evaluation (OT Eval)  -- Pt. was SBA with all mobility and LE dressing skills   -VS        Patient/Family Goals Statement (OT Eval)  'I would like my leghs to be smaller just to be able to get around better\"  "  -VS        Criteria for Skilled Therapeutic Interventions Met (OT Eval)  treatment indicated  -VS        Rehab Potential (OT Eval)  good, to achieve stated therapy goals  -VS        Therapy Frequency (OT Eval)  5 times/wk  -VS        Care Plan Review (OT)  evaluation/treatment results reviewed;care plan/treatment goals reviewed;risks/benefits reviewed;patient/other agree to care plan  -VS           Planned OT Interventions    Planned Therapy Interventions (OT Eval)  edema control/reduction  -VS           OT Goals    Problem Specific Goal Selection (OT)  problem specific goal 1, OT  -VS        Additional Documentation  Problem Specific Goal Selection (OT) (Row)  -VS           Problem Specific Goal 1 (OT)    Problem Specific Goal 1 (OT)  Pt. to tolerate LE Lymph wraps on (B)LE to reduce swelling   -VS        Time Frame (Problem Specific Goal 1, OT)  short term goal (STG);2 weeks  -VS        Progress/Outcome (Problem Specific Goal 1, OT)  continuing progress toward goal  -VS          User Key  (r) = Recorded By, (t) = Taken By, (c) = Cosigned By    Initials Name Effective Dates    VS [place] Boyer OTR 06/08/18 -                OT Recommendation and Plan  Outcome Summary/Treatment Plan (OT)  Anticipated Discharge Disposition (OT): home, home with OP services  Planned Therapy Interventions (OT Eval): edema control/reduction  Therapy Frequency (OT Eval): 5 times/wk  Plan of Care Review  Plan of Care Reviewed With: patient  Plan of Care Reviewed With: patient    Outcome Measures     Row Name 02/11/20 1300             How much help from another is currently needed...    Putting on and taking off regular lower body clothing?  4  -VS      Bathing (including washing, rinsing, and drying)  3  -VS      Toileting (which includes using toilet bed pan or urinal)  3  -VS      Putting on and taking off regular upper body clothing  4  -VS      Taking care of personal grooming (such as brushing teeth)  4  -VS      Eating meals  4   -VS      AM-PAC 6 Clicks Score (OT)  22  -VS         Functional Assessment    Outcome Measure Options  AM-PAC 6 Clicks Daily Activity (OT)  -VS        User Key  (r) = Recorded By, (t) = Taken By, (c) = Cosigned By    Initials Name Provider Type    VS [place] Boyer OTR Occupational Therapist          Time Calculation:   Time Calculation- OT     Row Name 02/11/20 1306             Time Calculation- OT    OT Start Time  1000  -VS      OT Stop Time  1040  -VS      OT Time Calculation (min)  40 min  -VS      Total Timed Code Minutes- OT  30 minute(s)  -VS      OT Received On  02/11/20  -VS      OT Goal Re-Cert Due Date  02/25/20  -VS        User Key  (r) = Recorded By, (t) = Taken By, (c) = Cosigned By    Initials Name Provider Type    VS [place] Boyer OTR Occupational Therapist        Therapy Charges for Today     Code Description Service Date Service Provider Modifiers Qty    10529404395 HC OT EVAL LOW COMPLEXITY 3 2/11/2020 Florida Boeyr OTR GO 1    [id number]  OT MANUAL THERAPY EA 15 MIN 2/11/2020 Floriad Boyer OTR GO 2               BLUE [place]  2/11/2020

## 2020-02-11 NOTE — PROGRESS NOTES
Discharge Planning Assessment  [facility]     Patient Name: Jose Hurtado  MRN: [id number]  Today's Date: 2/11/2020    Admit Date: 2/10/2020    Discharge Needs Assessment     Row Name 02/11/20 1213       Discharge Needs Assessment    Equipment Currently Used at Home  cane, straight;grab bar;bipap/cpap;shower chair    Row Name 02/11/20 1211       Living Environment    Lives With  spouse    Current Living Arrangements  home/apartment/condo    Primary Care Provided by  self    Provides Primary Care For  no one, unable/limited ability to care for self    Family Caregiver if Needed  spouse    Family Caregiver Names  Bria Paredes    Quality of Family Relationships  helpful;involved;supportive    Able to Return to Prior Arrangements  yes       Resource/Environmental Concerns    Resource/Environmental Concerns  none    Transportation Concerns  car, none       Transition Planning    Patient/Family Anticipates Transition to  home with family    Patient/Family Anticipated Services at Transition  none    Transportation Anticipated  family or friend will provide       Discharge Needs Assessment    Readmission Within the Last 30 Days  no previous admission in last 30 days    Concerns to be Addressed  no discharge needs identified;denies needs/concerns at this time    Equipment Currently Used at Home  cane, straight    Anticipated Changes Related to Illness  none    Equipment Needed After Discharge  none    Provided post acute provider list?  N/A    N/A Provider List Comment  No needs        Discharge Plan     Row Name 02/11/20 1213       Plan    Plan Comments  Met with patient at the bedside. Explained CCP role and verified facesheet. Patient lives in a single story house with his wife. Patient has a cane he uses at times, a cpap, grab bars, and shower chair. Patient is still able to drive. He has used HH in the past but is unsure who the agency was, and he has been to [facility]. Patient denies having any needs at this  time. Current with PCP and pharmacy. Plans to return home at [place] and wife will provide transportation. Gogo Ray RN    Row Name 02/11/20 1211       Plan    Plan  Home with Spouse    Patient/Family in Agreement with Plan  yes               Demographic Summary     Row Name 02/11/20 1204       General Information    Admission Type  inpatient    Arrived From  emergency department    Referral Source  admission list    Reason for Consult  discharge planning    Preferred Language  English        Functional Status     Row Name 02/11/20 1205       Functional Status    Usual Activity Tolerance  moderate    Current Activity Tolerance  moderate       Functional Status, IADL    Medications  independent    Meal Preparation  independent    Housekeeping  independent    Laundry  independent    Shopping  independent       Mental Status    General Appearance WDL  WDL       Mental Status Summary    Recent Changes in Mental Status/Cognitive Functioning  no changes        Psychosocial     Row Name 02/11/20 1210       Coping/Stress    Sources of Support  spouse    Row Name 02/11/20 1205       Behavior WDL    Behavior WDL  WDL       Emotion Mood WDL    Emotion/Mood/Affect WDL  WDL       Speech WDL    Speech WDL  WDL       Perceptual State WDL    Perceptual State WDL  WDL       Thought Process WDL    Thought Process WDL  WDL       Intellectual Performance WDL    Intellectual Performance WDL  WDL       Coping/Stress    Major Change/Loss/Stressor  none    Patient Personal Strengths  able to adapt    Sources of Support  adult child(mallory)    Reaction to Health Status  accepting    Understanding of Condition and Treatment  adequate understanding of medical condition;adequate understanding of treatment       Developmental Stage (Eriksson's)    Developmental Stage  Stage 8 (65 years-death/Late Adulthood) Integrity vs. Despair        Abuse/Neglect     Row Name 02/11/20 1210       Personal Safety    Feels Unsafe at Home or Work/School  no    Feels  Threatened by Someone  no    Does Anyone Try to Keep You From Having Contact with Others or Doing Things Outside Your Home?  no    Physical Signs of Abuse Present  no                Gogo Ray RN

## 2020-02-11 NOTE — PLAN OF CARE
Problem: Patient Care Overview  Goal: Plan of Care Review  Outcome: Ongoing (interventions implemented as appropriate)  Flowsheets (Taken 2/11/2020 6521)  Progress: no change  Plan of Care Reviewed With: patient  Outcome Summary: Pt has no c/o pain, IV abx, pulm. consulted. Chronic lama cath in place. No s/s of distress noted will continue to monitor     Problem: Pain, Chronic (Adult)  Goal: Identify Related Risk Factors and Signs and Symptoms  Outcome: Ongoing (interventions implemented as appropriate)     Problem: Skin Injury Risk (Adult)  Goal: Identify Related Risk Factors and Signs and Symptoms  Outcome: Ongoing (interventions implemented as appropriate)     Problem: Fall Risk (Adult)  Goal: Identify Related Risk Factors and Signs and Symptoms  Outcome: Ongoing (interventions implemented as appropriate)

## 2020-02-11 NOTE — CONSULTS
[facility]    Reason for Consult: abnormal CXR    HPI:  Mr. Hurtado is a 69yo WM with a history esophagectomy s/p gastric pull through procedure resulting in chronic left lower lobe changes on CXR he has degree of chronic loculated right sided effusion as well.  He presented to the ER today complaining productive cough and shortness of breath.  He has been short of breath and weak with fevers for about a week. shotness of breath is present constantly worse on exertoin. HE has had a productive cough.  HE has had some wheezing.  He did get levaquin but it has not helped hist symptoms.  He follows with Dr. Sommers in our office, I reviewed his office note from 1/29    Past Medical History:   Diagnosis Date   • Anemia    • Anti-phospholipid syndrome (CMS/HCC)     On lifelong anticoagulation therapy   • Bladder disorder     LEAKAGE   ON MED  wers pads   • Community acquired pneumonia     HISTORY OF IN 2014   • Deep venous thrombosis (CMS/HCC) 2006, 2008    Left lower extremity multiple   • Depression    • Esophageal carcinoma (CMS/HCC) 12/31/2014    had chemo and radiation prior surgery   • Hemorrhoids    • HH (hiatus hernia)    • History of atrial fibrillation 2015    ONE EPISODE WHILE HOSPITALIZED   • History of kidney stones    • History of nephrolithiasis    • History of pancreatitis     PT STATES MANY YEARS AGO   • History of radiation therapy    • Hypertension    • Long-term (current) use of anticoagulants, INR goal 2.0-3.0    • Lymphedema    • Malignant neoplasm of prostate (CMS/HCC)    • Other hyperlipidemia 1/30/2018 January 30, 2018 lipid panel risk 12.8%   • Restless legs syndrome    • Shortness of breath    • Squamous carcinoma     on the head     Social History     Socioeconomic History   • Marital status:      Spouse name: Lena   • Number of children: 0   • Years of education: College   • Highest education level: Not on file   Occupational History   • Occupation: Retired Prosecuting       Comment: Retired 2014   Tobacco Use   • Smoking status: Former Smoker     Packs/day: 2.00     Years: 3.00     Pack years: 6.00     Types: Cigarettes     Last attempt to quit: 1974     Years since quittin.1   • Smokeless tobacco: Never Used   • Tobacco comment: no caffeine    Substance and Sexual Activity   • Alcohol use: Yes     Frequency: 2-3 times a week     Comment: 2-3 x per week   • Drug use: No   • Sexual activity: Defer   Social History Narrative    self-employed      Family History   Problem Relation Age of Onset   • Cerebral aneurysm Mother         cerebral artery aneurysm ( age 56)   • Prostate cancer Brother 68   • Anxiety disorder Father    • Suicide Attempts Father          of suicide   • Cancer Father         bladder   • No Known Problems Brother    • Colon cancer Neg Hx    • Esophageal cancer Neg Hx    • Dementia Neg Hx      MEDS: reviewed as per chart  ALL: NKDA  ROS:  Constitutional: Positive for chills. Negative for activity change, appetite change and fever.   HENT: Positive for congestion, rhinorrhea and sore throat.    Eyes: Negative.    Respiratory: Positive for cough and shortness of breath.    Cardiovascular: Positive for leg swelling (BLE). Negative for chest pain.   Gastrointestinal: Positive for vomiting. Negative for abdominal pain and diarrhea.   Endocrine: Negative.    Genitourinary: Negative for decreased urine volume and dysuria.   Musculoskeletal: Negative for neck pain.   Skin: Negative for rash and wound.   Allergic/Immunologic: Negative.    Neurological: Negative for weakness, numbness and headaches.   Hematological: Negative.    Psychiatric/Behavioral: Negative.    All other systems reviewed and are negative.    Vital Sign Min/Max for last 24 hours  Temp  Min: 97.4 °F (36.3 °C)  Max: 101.6 °F (38.7 °C)   BP  Min: 107/56  Max: 175/84   Pulse  Min: 68  Max: 86   Resp  Min: 18  Max: 22   SpO2  Min: 94 %  Max: 97 %   Flow (L/min)  Min: 1  Max: 4    Weight  Min: 93 kg (205 lb)  Max: 93.1 kg (205 lb 4.8 oz)     GEN:  appears ill, , AxOx3  HEENT: PERRL, EOMI, no icterus, mmm, no jvd, trachea midline, neck supple, no palpable thyroid nodules  CHEST: decreased bilat, no wheezes, + crackles, no use of accessory muscles  CV: RRR, no m/g/r  ABD: soft, nt, nd +bs, no hepatosplenomegaly  EXT: no c/c/ 1+edema  SKIN: no rashes, no xanthomas, nl turgor, warm, dry  LYMPH: no palpable cervical or supraclavicular lymphadenopathy  NEURO: CN 2-12 intact and symmetric bilaterally  PSYCH: nl affect, nl orientation, nl judgement, nl mood  MSK: + kyposcoliosis, 5/5 strength ue and le bilaterally    Labs: 2/10: reviewed:  inr 3.76  rpp negative  Glucose 98  Bun 22  Cr 1.23  Na 141  k 4.8  Bicarb 26  Lactate 1  Wbc 4.7  hgb 10.1  plts 198    CXR: 2/10: there is a chronic right sided pleural effusion and chronic right lower/middle lobe changes, I do think likely there is some degree of acute infitrate there when compared with prior  CT chest : jeffrey 15: reviewed    A/P:  1. Pneumonia -- suspect aspiration given his recent vomiting, h/o gastric pull through and right lower lobe locations. Will add anaerobic coverage.  2. COPD -- continue bronchodilators  3. Chronic right sided effusion -- not that big on last ct, likely loculated, I don't think drainage would be beneficial and likely not possible  4. Restrictive lung disease -- result of surgery  5. JAI -- non compliant with pap therapy  6. Vomiting -- has been a long standing issue, Dr. Sommers had suggested esophagram be done at last office visit 1/27  7. anemia

## 2020-02-11 NOTE — PROGRESS NOTES
Pharmacy Consult: Warfarin Dosing/ Monitoring    Jose Hurtado is a 70 y.o. male, estimated creatinine clearance is 71.4 mL/min (by C-G formula based on SCr of 1.23 mg/dL). weighing 93 kg (205 lb).     has a past medical history of Anemia, Anti-phospholipid syndrome (CMS/HCC), Bladder disorder, Community acquired pneumonia, Deep venous thrombosis (CMS/HCC) (, ), Depression, Esophageal carcinoma (CMS/HCC) (2014), Hemorrhoids, HH (hiatus hernia), History of atrial fibrillation (), History of kidney stones, History of nephrolithiasis, History of pancreatitis, History of radiation therapy, Hypertension, Long-term (current) use of anticoagulants, INR goal 2.0-3.0, Lymphedema, Malignant neoplasm of prostate (CMS/HCC), Other hyperlipidemia (2018), Restless legs syndrome, Shortness of breath, and Squamous carcinoma.    Social History     Tobacco Use    Smoking status: Former Smoker     Packs/day: 2.00     Years: 3.00     Pack years: 6.00     Types: Cigarettes     Last attempt to quit: 1974     Years since quittin.1    Smokeless tobacco: Never Used    Tobacco comment: no caffeine    Substance Use Topics    Alcohol use: Yes     Frequency: 2-3 times a week     Comment: 2-3 x per week    Drug use: No       Results from last 7 days   Lab Units 02/10/20  1251 20  1043 20  1122   INR  3.04*  --   --    HEMOGLOBIN g/dL 10.1*  --  9.2*   HEMATOCRIT % 31.2* 29.1* 28.9*   PLATELETS 10*3/mm3 198  --  187     Results from last 7 days   Lab Units 02/10/20  1251 20  1043   SODIUM mmol/L 141 140   POTASSIUM mmol/L 4.8 4.7   CHLORIDE mmol/L 104 103   CO2 mmol/L 26.0 27.2   BUN mg/dL 22 26*   CREATININE mg/dL 1.23 1.48*   CALCIUM mg/dL 9.0 9.0   BILIRUBIN mg/dL 0.3 0.2   ALK PHOS U/L 112 117   ALT (SGPT) U/L 23 25   AST (SGOT) U/L 36 52*   GLUCOSE mg/dL 98 113*     Anticoagulation history: warfarin 2.5 mg every M/W/F/Sun; 5 mg all other days     Hospital Anticoagulation:  Consulting provider:  Dr. Fiona Barba MD  Start date: Prior to admission   Indication: Anti-phospholipid syndrome   Target INR: 2-3  Expected duration: indefinite   Bridge Therapy: No                Date 2/10            INR 3.04            Warfarin dose HOLD              Potential drug interactions: paroxetine, ceftriaxone, azithromycin--potential increased anticoagulant effects of warfarin      Relevant nutrition status: regular diet, cardiac low sodium    Education complete?/ Date: TBD    Assessment/Plan:  Patient's INR is just slightly supratherapeutic at 3.04. Additionally, the patient has started new medications with the potential to further increase the anticoagulant effects of warfarin, namely azithromycin. Based on this information will hold on a dose this evening and follow-up with tomorrow's INR.  Daily INR ordered and will be assessed to guide regimen moving forward.      Pharmacy will continue to follow until discharge or discontinuation of warfarin.   Sadie Bains RPH  2/10/2020

## 2020-02-11 NOTE — PROGRESS NOTES
Name: Jose Hurtado ADMIT: 2/10/2020   : 1950  PCP: Brandin Bolton MD    MRN: [id number] LOS: 1 days   AGE/SEX: 70 y.o. male  ROOM: [facility]   Subjective   Chief Complaint   Patient presents with   • Weakness - Generalized   • Flu Symptoms     Dyspnea fair  Cough fair  On IV abx  Chronic swelling    ROS  No f/c  No n/v  No cp/palp  +soa/cough    Objective   Vital Signs  Temp:  [97.2 °F (36.2 °C)-100.2 °F (37.9 °C)] 97.2 °F (36.2 °C)  Heart Rate:  [66-84] 72  Resp:  [18-20] 18  BP: (103-152)/(52-64) 114/60  SpO2:  [93 %-100 %] 100 %  on  Flow (L/min):  [1-2] 1;   Device (Oxygen Therapy): room air  Body mass index is 24.32 kg/m².    Physical Exam   Constitutional: He is oriented to person, place, and time. He appears well-developed and well-nourished. No distress.   HENT:   Head: Normocephalic and atraumatic.   Mouth/Throat: Oropharynx is clear and moist.   Eyes: Conjunctivae are normal. No scleral icterus.   Neck: Normal range of motion. Neck supple.   Cardiovascular: Normal rate, regular rhythm and normal heart sounds.   Pulmonary/Chest: Effort normal. No respiratory distress. He has rales (slight).   Abdominal: Soft. Bowel sounds are normal. There is no tenderness.   Musculoskeletal: He exhibits edema (3+ LE edema). He exhibits no deformity.   Neurological: He is alert and oriented to person, place, and time.   Skin: Skin is warm and dry. He is not diaphoretic.   Psychiatric: He has a normal mood and affect. His behavior is normal. Thought content normal.   Nursing note and vitals reviewed.      Results Review:       I reviewed the patient's new clinical results.  Results from last 7 days   Lab Units 20  0439 02/10/20  1251 20  1122   WBC 10*3/mm3 7.21 4.71 4.11   HEMOGLOBIN g/dL 7.8* 10.1* 9.2*   PLATELETS 10*3/mm3 155 198 187     Results from last 7 days   Lab Units 20  0439 02/10/20  1251 20  1043   SODIUM mmol/L 139 141 140   POTASSIUM mmol/L 4.1 4.8 4.7   CHLORIDE mmol/L 104 104  103   CO2 mmol/L 25.5 26.0 27.2   BUN mg/dL 20 22 26*   CREATININE mg/dL 1.05 1.23 1.48*   GLUCOSE mg/dL 117* 98 113*   Estimated Creatinine Clearance: 83.7 mL/min (by C-G formula based on SCr of 1.05 mg/dL).  Results from last 7 days   Lab Units 02/10/20  1251 02/07/20  1043   ALBUMIN g/dL 3.70 3.50   BILIRUBIN mg/dL 0.3 0.2   ALK PHOS U/L 112 117   AST (SGOT) U/L 36 52*   ALT (SGPT) U/L 23 25     Results from last 7 days   Lab Units 02/11/20  0439 02/10/20  1251 02/07/20  1043   CALCIUM mg/dL 8.3* 9.0 9.0   ALBUMIN g/dL  --  3.70 3.50   MAGNESIUM mg/dL 2.0  --   --    PHOSPHORUS mg/dL 2.3*  --   --      Results from last 7 days   Lab Units 02/11/20  0028 02/10/20  1251   PROCALCITONIN ng/mL 1.37*  --    LACTATE mmol/L  --  1.0       Coag   Results from last 7 days   Lab Units 02/11/20  0439 02/10/20  2048 02/10/20  1251   INR  4.22* 3.76* 3.04*     HbA1C   Lab Results   Component Value Date    HGBA1C 6.20 (H) 01/17/2020     Infection   Results from last 7 days   Lab Units 02/11/20  0028 02/10/20  1531 02/10/20  1251   BLOODCX   --  No growth at less than 24 hours No growth at less than 24 hours   PROCALCITONIN ng/mL 1.37*  --   --      Radiology(recent) Xr Chest 2 View    Result Date: 2/10/2020  Persistent right pleural effusion. Increased opacity at the right lower lung suggesting atelectasis/infiltrate, follow-up recommended.  This report was finalized on 2/10/2020 1:48 PM by Dr. Sumanth Boyer M.D.      Troponin T   Date Value Ref Range Status   02/11/2020 0.039 (C) 0.000 - 0.030 ng/mL Final     No components found for: TSH;2      azithromycin 500 mg Intravenous Q24H   cefTRIAXone 2 g Intravenous Q24H   ferrous sulfate 325 mg Oral QAM AC   furosemide 20 mg Oral Daily   guaiFENesin 1,200 mg Oral Q12H   ipratropium-albuterol 3 mL Nebulization Q4H - RT   metroNIDAZOLE 500 mg Intravenous Q8H   PARoxetine 40 mg Oral QAM   potassium chloride 10 mEq Oral Daily   pramipexole 0.5 mg Oral BID   tamsulosin 0.4 mg Oral  Nightly   traZODone 100 mg Oral Nightly       Pharmacy to dose warfarin    Diet Regular; Cardiac, Low Sodium; 2,000 mg Na      Assessment/Plan      Active Hospital Problems    Diagnosis  POA   • Elevated troponin [R79.89]  Unknown   • Pneumonia of right lower lobe due to infectious organism (CMS/HCC) [J18.1]  Yes   • Iron deficiency [E61.1]  Yes   • Lymphedema [I89.0]  Yes   • Long-term (current) use of anticoagulants, INR goal 2.0-3.0 [Z79.01]  Not Applicable   • Postsurgical malabsorption [K91.2]  Yes   • Peripheral polyneuropathy [G62.9]  Yes   • Anemia [D64.9]  Yes   • Anti-phospholipid syndrome (CMS/HCC) [D68.61]  Yes   • Hypertension [I10]  Yes   • RLS (restless legs syndrome) [G25.81]  Yes      Resolved Hospital Problems   No resolved problems to display.       ·  Pneumonia of right lower lobe due to infectious organism with pleural effusion  -cxr Increasing opacity in the right lower lung.  --Bld cultures,  sputum cultures, IV antibiotics   -Incentive spirometer  -Recommend Follow-up Chest X-ray       · Pleural effusion-echo last month showed an EF of 72%  -Continue medical management  -Patient on low-dose diuretics  -Supplemental oxygen PRN  -Pulmonary has seen and feel thoracentesis unlikely beneficial or possible     ·   Lymphedema  -Wraps       ·     Anti-phospholipid syndrome /Long-term (current) use of anticoagulants, INR goal 2.0-3.0  -Stable, continue Coumadin-monitor INR and adjust as needed the azithromycin will interact      ·   RLS (restless legs syndrome)  -on  Mirapex and continue-  (Dose was initially decreased )     · Chronic King   -continue catheter care     ·   Postsurgical malabsorption/ Iron deficiency/ Hypertension/ Peripheral polyneuropathy  -Stable, continue medical management     -Anemia  No evidece for current bleeding. Check labs and monitor    -Elevated troponin  No symptoms of chest pain. Monitor but no further workup at this point.     +DVT proph: scd's  + Full code      DW  RN  Greater than 36 minutes spent with greater than 50% counseling and coordinating care     reviewed previous records    Juanpablo Garg MD  [facility]  02/11/20  10:29 AM

## 2020-02-12 LAB
ANION GAP SERPL CALCULATED.3IONS-SCNC: 10.4 MMOL/L (ref 5–15)
BASOPHILS # BLD AUTO: 0.03 10*3/MM3 (ref 0–0.2)
BASOPHILS NFR BLD AUTO: 0.6 % (ref 0–1.5)
BUN BLD-MCNC: 14 MG/DL (ref 8–23)
BUN/CREAT SERPL: 14 (ref 7–25)
CALCIUM SPEC-SCNC: 8.4 MG/DL (ref 8.6–10.5)
CHLORIDE SERPL-SCNC: 103 MMOL/L (ref 98–107)
CO2 SERPL-SCNC: 24.6 MMOL/L (ref 22–29)
CREAT BLD-MCNC: 1 MG/DL (ref 0.76–1.27)
DEPRECATED RDW RBC AUTO: 45.2 FL (ref 37–54)
EOSINOPHIL # BLD AUTO: 0.27 10*3/MM3 (ref 0–0.4)
EOSINOPHIL NFR BLD AUTO: 5 % (ref 0.3–6.2)
ERYTHROCYTE [DISTWIDTH] IN BLOOD BY AUTOMATED COUNT: 14.2 % (ref 12.3–15.4)
FOLATE SERPL-MCNC: 5.49 NG/ML (ref 4.78–24.2)
GFR SERPL CREATININE-BSD FRML MDRD: 73 ML/MIN/1.73
GLUCOSE BLD-MCNC: 89 MG/DL (ref 65–99)
HCT VFR BLD AUTO: 23.6 % (ref 37.5–51)
HGB BLD-MCNC: 7.8 G/DL (ref 13–17.7)
IMM GRANULOCYTES # BLD AUTO: 0.01 10*3/MM3 (ref 0–0.05)
IMM GRANULOCYTES NFR BLD AUTO: 0.2 % (ref 0–0.5)
INR PPP: 2.93 (ref 0.9–1.1)
LYMPHOCYTES # BLD AUTO: 0.75 10*3/MM3 (ref 0.7–3.1)
LYMPHOCYTES NFR BLD AUTO: 13.8 % (ref 19.6–45.3)
MCH RBC QN AUTO: 28.9 PG (ref 26.6–33)
MCHC RBC AUTO-ENTMCNC: 33.1 G/DL (ref 31.5–35.7)
MCV RBC AUTO: 87.4 FL (ref 79–97)
MONOCYTES # BLD AUTO: 0.38 10*3/MM3 (ref 0.1–0.9)
MONOCYTES NFR BLD AUTO: 7 % (ref 5–12)
NEUTROPHILS # BLD AUTO: 3.99 10*3/MM3 (ref 1.7–7)
NEUTROPHILS NFR BLD AUTO: 73.4 % (ref 42.7–76)
NRBC BLD AUTO-RTO: 0 /100 WBC (ref 0–0.2)
PLATELET # BLD AUTO: 161 10*3/MM3 (ref 140–450)
PMV BLD AUTO: 9.4 FL (ref 6–12)
POTASSIUM BLD-SCNC: 4.1 MMOL/L (ref 3.5–5.2)
PROTHROMBIN TIME: 30.2 SECONDS (ref 11.7–14.2)
RBC # BLD AUTO: 2.7 10*6/MM3 (ref 4.14–5.8)
SODIUM BLD-SCNC: 138 MMOL/L (ref 136–145)
VIT B12 BLD-MCNC: 556 PG/ML (ref 211–946)
WBC NRBC COR # BLD: 5.43 10*3/MM3 (ref 3.4–10.8)

## 2020-02-12 PROCEDURE — 85025 COMPLETE CBC W/AUTO DIFF WBC: CPT | Performed by: INTERNAL MEDICINE

## 2020-02-12 PROCEDURE — 80048 BASIC METABOLIC PNL TOTAL CA: CPT | Performed by: INTERNAL MEDICINE

## 2020-02-12 PROCEDURE — 94799 UNLISTED PULMONARY SVC/PX: CPT

## 2020-02-12 PROCEDURE — 99222 1ST HOSP IP/OBS MODERATE 55: CPT | Performed by: INTERNAL MEDICINE

## 2020-02-12 PROCEDURE — 85610 PROTHROMBIN TIME: CPT | Performed by: INTERNAL MEDICINE

## 2020-02-12 PROCEDURE — 25010000002 AZITHROMYCIN PER 500 MG: Performed by: INTERNAL MEDICINE

## 2020-02-12 PROCEDURE — 82607 VITAMIN B-12: CPT | Performed by: INTERNAL MEDICINE

## 2020-02-12 PROCEDURE — 25010000003 CEFTRIAXONE PER 250 MG: Performed by: INTERNAL MEDICINE

## 2020-02-12 PROCEDURE — 97140 MANUAL THERAPY 1/> REGIONS: CPT

## 2020-02-12 PROCEDURE — 82746 ASSAY OF FOLIC ACID SERUM: CPT | Performed by: INTERNAL MEDICINE

## 2020-02-12 RX ORDER — WARFARIN SODIUM 3 MG/1
1.5 TABLET ORAL
Status: DISCONTINUED | OUTPATIENT
Start: 2020-02-12 | End: 2020-02-14

## 2020-02-12 RX ORDER — AMOXICILLIN AND CLAVULANATE POTASSIUM 875; 125 MG/1; MG/1
1 TABLET, FILM COATED ORAL EVERY 12 HOURS SCHEDULED
Status: COMPLETED | OUTPATIENT
Start: 2020-02-12 | End: 2020-02-19

## 2020-02-12 RX ORDER — PANTOPRAZOLE SODIUM 40 MG/1
40 TABLET, DELAYED RELEASE ORAL
Status: DISCONTINUED | OUTPATIENT
Start: 2020-02-12 | End: 2020-02-19 | Stop reason: HOSPADM

## 2020-02-12 RX ADMIN — TAMSULOSIN HYDROCHLORIDE 0.4 MG: 0.4 CAPSULE ORAL at 20:12

## 2020-02-12 RX ADMIN — IPRATROPIUM BROMIDE AND ALBUTEROL SULFATE 3 ML: 2.5; .5 SOLUTION RESPIRATORY (INHALATION) at 16:08

## 2020-02-12 RX ADMIN — PANTOPRAZOLE SODIUM 40 MG: 40 TABLET, DELAYED RELEASE ORAL at 17:23

## 2020-02-12 RX ADMIN — FERROUS SULFATE TAB 325 MG (65 MG ELEMENTAL FE) 325 MG: 325 (65 FE) TAB at 06:35

## 2020-02-12 RX ADMIN — METRONIDAZOLE 500 MG: 500 INJECTION, SOLUTION INTRAVENOUS at 17:13

## 2020-02-12 RX ADMIN — PRAMIPEXOLE DIHYDROCHLORIDE 0.5 MG: 0.5 TABLET ORAL at 08:26

## 2020-02-12 RX ADMIN — CEFTRIAXONE SODIUM 2 G: 2 INJECTION, SOLUTION INTRAVENOUS at 15:44

## 2020-02-12 RX ADMIN — AZITHROMYCIN DIHYDRATE 500 MG: 500 INJECTION, POWDER, LYOPHILIZED, FOR SOLUTION INTRAVENOUS at 15:44

## 2020-02-12 RX ADMIN — AMOXICILLIN AND CLAVULANATE POTASSIUM 1 TABLET: 875; 125 TABLET, FILM COATED ORAL at 21:30

## 2020-02-12 RX ADMIN — PRAMIPEXOLE DIHYDROCHLORIDE 0.5 MG: 0.5 TABLET ORAL at 20:12

## 2020-02-12 RX ADMIN — PAROXETINE 40 MG: 20 TABLET, FILM COATED ORAL at 06:36

## 2020-02-12 RX ADMIN — IPRATROPIUM BROMIDE AND ALBUTEROL SULFATE 3 ML: 2.5; .5 SOLUTION RESPIRATORY (INHALATION) at 19:30

## 2020-02-12 RX ADMIN — METRONIDAZOLE 500 MG: 500 INJECTION, SOLUTION INTRAVENOUS at 01:00

## 2020-02-12 RX ADMIN — GUAIFENESIN 1200 MG: 600 TABLET, EXTENDED RELEASE ORAL at 08:26

## 2020-02-12 RX ADMIN — GUAIFENESIN 1200 MG: 600 TABLET, EXTENDED RELEASE ORAL at 20:12

## 2020-02-12 RX ADMIN — METRONIDAZOLE 500 MG: 500 INJECTION, SOLUTION INTRAVENOUS at 08:26

## 2020-02-12 RX ADMIN — FUROSEMIDE 20 MG: 20 TABLET ORAL at 08:26

## 2020-02-12 RX ADMIN — IPRATROPIUM BROMIDE AND ALBUTEROL SULFATE 3 ML: 2.5; .5 SOLUTION RESPIRATORY (INHALATION) at 11:23

## 2020-02-12 RX ADMIN — SODIUM CHLORIDE, PRESERVATIVE FREE 10 ML: 5 INJECTION INTRAVENOUS at 20:12

## 2020-02-12 RX ADMIN — IPRATROPIUM BROMIDE AND ALBUTEROL SULFATE 3 ML: 2.5; .5 SOLUTION RESPIRATORY (INHALATION) at 06:53

## 2020-02-12 RX ADMIN — POTASSIUM CHLORIDE 10 MEQ: 750 CAPSULE, EXTENDED RELEASE ORAL at 08:26

## 2020-02-12 RX ADMIN — TRAZODONE HYDROCHLORIDE 100 MG: 100 TABLET ORAL at 20:12

## 2020-02-12 RX ADMIN — WARFARIN 1.5 MG: 3 TABLET ORAL at 17:24

## 2020-02-12 NOTE — THERAPY TREATMENT NOTE
Acute Care - Occupational Therapy Treatment Note  [facility]     Patient Name: Jose Hurtado  : 1950  MRN: [id number]  Today's Date: 2020             Admit Date: 2/10/2020       ICD-10-CM ICD-9-CM   1. Pneumonia of right lower lobe due to infectious organism (CMS/HCC) J18.1 486   2. Rigors R68.89 780.99     Patient Active Problem List   Diagnosis   • Adenocarcinoma of esophagus (CMS/HCC)   • Adenomatous polyp of colon   • Malignant neoplasm of prostate (CMS/HCC)   • RLS (restless legs syndrome)   • Depression   • Hypertension   • Anti-phospholipid syndrome (CMS/HCC)   • Anemia   • Insomnia due to other mental disorder   • Peripheral polyneuropathy   • B12 deficiency   • Postsurgical malabsorption   • Shortness of breath   • Long-term (current) use of anticoagulants, INR goal 2.0-3.0   • Lymphedema   • Pneumonia of right lower lobe due to infectious organism (CMS/HCC)   • Iron deficiency   • Elevated troponin     Past Medical History:   Diagnosis Date   • Anemia    • Anti-phospholipid syndrome (CMS/HCC)     On lifelong anticoagulation therapy   • Bladder disorder     LEAKAGE   ON MED  wers pads   • Community acquired pneumonia     HISTORY OF IN    • Deep venous thrombosis (CMS/HCC) ,     Left lower extremity multiple   • Depression    • Esophageal carcinoma (CMS/HCC) 2014    had chemo and radiation prior surgery   • Hemorrhoids    • HH (hiatus hernia)    • History of atrial fibrillation 2015    ONE EPISODE WHILE HOSPITALIZED   • History of kidney stones    • History of nephrolithiasis    • History of pancreatitis     PT STATES MANY YEARS AGO   • History of radiation therapy    • Hypertension    • Long-term (current) use of anticoagulants, INR goal 2.0-3.0    • Lymphedema    • Malignant neoplasm of prostate (CMS/HCC)    • Other hyperlipidemia 2018 lipid panel risk 12.8%   • Restless legs syndrome    • Shortness of breath    • Squamous carcinoma     on the head      Past Surgical History:   Procedure Laterality Date   • APPENDECTOMY  1950   • BRONCHOSCOPY      (Diagnostic)   • CATARACT EXTRACTION Bilateral 2014   • COLONOSCOPY  12/15/2014    Complete / Description: EH, IH, torts, stool, follow-up colonoscopy due in 5 years.   • COLONOSCOPY N/A 6/13/2017    non-thrombosed external hemorrhoids, normal examined ileum, IH   • COLONOSCOPY N/A 2/26/2019    Procedure: COLONOSCOPY with Cold Polypectomy;  Surgeon: Mulugeta Millan MD;  Location: [facility] ENDOSCOPY;  Service: Gastroenterology   • CYSTOSCOPY W/ LASER LITHOTRIPSY     • ENDOSCOPY N/A 6/13/2017    Procedure: ESOPHAGOGASTRODUODENOSCOPY WITH COLD BIOPSY;  Surgeon: Lake Gonzalez MD;  Location: [facility] ENDOSCOPY;  Service:    • ESOPHAGECTOMY      April 2015, stage IIB esophageal carcinoma, sub-total resection.   • ESOPHAGECTOMY      Esophagectomy Subtotal Andrea Joe Procedure   • EXCISION LESION  08/2012    Removal of Squamous Cell CA on Head   • HAMMER TOE REPAIR  09/2014    Hammertoe Operation (Each Toe), 10/2014   • HAMMER TOE REPAIR Left 10/3/2017    Procedure: Left second third and fourth distal interphalangeal joint resection with flexor tenotomy;  Surgeon: Mulugeta Lira MD;  Location: [facility] OR OSC;  Service:    • HERNIA REPAIR      incisional   • JEJUNOSTOMY      Laparoscopic   • JEJUNOSTOMY      tube removal    • KNEE SURGERY Bilateral 1967, 1973, 1981   • PATELLA SURGERY Left     removed   • PILONIDAL CYST / SINUS EXCISION     • KS TOTAL KNEE ARTHROPLASTY Right 3/26/2018    Procedure: TOTAL KNEE ARTHROPLASTY;  Surgeon: Renny Solis MD;  Location: [facility] MAIN OR;  Service: Orthopedics   • PROSTATECTOMY  2010   • PYLOROPLASTY     • SPINAL FUSION  02/1998    C 5,6   • TONSILLECTOMY  1955   • TONSILLECTOMY     • UPPER GASTROINTESTINAL ENDOSCOPY  12/15/2014    LA Grade D esophagitis, Pardo's, HH, multiple duodenal ulcers   • VENTRAL/INCISIONAL HERNIA REPAIR N/A 4/14/2016    Procedure:  VENTRAL/INCISIONAL HERNIA REPAIR, open, with mesh, and component separation;  Surgeon: Darren Rivas MD;  Location: [facility] MAIN OR;  Service:        Therapy Treatment    Rehabilitation Treatment Summary     Row Name 02/12/20 1045             Treatment Time/Intention    Discipline  occupational therapist  -VS      Document Type  therapy note (daily note)  -VS      Subjective Information  -- i DID NOT HAVE A GOOD NIGHT DID NOT SLEEP AT ALL   -VS      Mode of Treatment  individual therapy;occupational therapy  -VS      Care Plan Review  care plan/treatment goals reviewed;risks/benefits reviewed  -VS      Comment  Edema has improved with (B)LEs, (L) still is larger than (R)   -VS      Existing Precautions/Restrictions  fall  -VS      Recorded by [VS] Florida Boyer OTR 02/12/20 1320      Row Name 02/12/20 1045             Cognitive Assessment/Intervention- PT/OT    Orientation Status (Cognition)  oriented x 4  -VS      Follows Commands (Cognition)  follows multi-step commands  -VS      Recorded by [VS] Florida Boyer OTR 02/12/20 1320      Row Name 02/12/20 1045             Bed Mobility Assessment/Treatment    Bed Mobility Assessment/Treatment  bed mobility (all) activities  -VS      [place] Level (Bed Mobility)  conditional independence  -VS      Recorded by [VS] Florida Boyer OTR 02/12/20 1320      Row Name 02/12/20 1045             Bathing Assessment/Intervention    Comment (Bathing)  tHERAPIST BATHE, DRIED AND APPLIED LOTION TO THE le TOES TO KNEE  -VS      Recorded by [VS] [place] Boyer OTR 02/12/20 1320      Row Name 02/12/20 1045             Edema Assessment & Management    Location (Edema)  lower extremity, left;lower extremity, right  -VS      Additional Documentation  Edema Symptoms/Interventions (Group)  -VS      Recorded by [VS] Florida Boyer OTR 02/12/20 1320      Row Name 02/12/20 1045             Lower Extremity Edema Measures, Left    Lower Extremity, Left (Edema)   mid-calf  -VS      Recorded by [VS] Florida Boyer OTR 02/12/20 1320      Row Name 02/12/20 1045             Lower Extremity Edema Measures, Right    Lower Extremity, Right (Edema)  mid-calf  -VS      Recorded by [VS] Florida Boeyr OTR 02/12/20 1320      Row Name 02/12/20 1045             Edema Symptoms/Interventions    Description (Edema)  localized;pitting  -VS      Pitting Scale (Edema)  2-->mild Mild on (R) and Mod on (L)  -VS      Treatment Interventions (Edema)  compression bandaging, short stretch;compression wrapping/taping #9[place],#15artiflex,#8 #10 x 2 Toes to knees (B)ly   -VS      Recorded by [VS] Florida Boyer OTR 02/12/20 1320        User Key  (r) = Recorded By, (t) = Taken By, (c) = Cosigned By    Initials Name Effective Dates Discipline    VS Florida Boyer OTR 06/08/18 -  OT                 OT Recommendation and Plan  Outcome Summary/Treatment Plan (OT)  Anticipated Discharge Disposition (OT): home, home with OP services  Planned Therapy Interventions (OT Eval): edema control/reduction  Therapy Frequency (OT Eval): 5 times/wk  Plan of Care Review  Plan of Care Reviewed With: patient  Plan of Care Reviewed With: patient  Outcome Measures     Row Name 02/11/20 1300             How much help from another is currently needed...    Putting on and taking off regular lower body clothing?  4  -VS      Bathing (including washing, rinsing, and drying)  3  -VS      Toileting (which includes using toilet bed pan or urinal)  3  -VS      Putting on and taking off regular upper body clothing  4  -VS      Taking care of personal grooming (such as brushing teeth)  4  -VS      Eating meals  4  -VS      AM-PAC 6 Clicks Score (OT)  22  -VS         Functional Assessment    Outcome Measure Options  AM-PAC 6 Clicks Daily Activity (OT)  -VS        User Key  (r) = Recorded By, (t) = Taken By, (c) = Cosigned By    Initials Name Provider Type    VS Florida Boyer OTR Occupational Therapist            Time Calculation:   Time Calculation- OT     Row Name 02/12/20 1323             Time Calculation- OT    OT Start Time  1017  -VS      OT Stop Time  1041  -VS      OT Time Calculation (min)  24 min  -VS      Total Timed Code Minutes- OT  24 minute(s)  -VS      OT Received On  02/12/20  -VS        User Key  (r) = Recorded By, (t) = Taken By, (c) = Cosigned By    Initials Name Provider Type    VS Florida Boyer OTR Occupational Therapist        Therapy Charges for Today     Code Description Service Date Service Provider Modifiers Qty    28175619375 HC OT EVAL LOW COMPLEXITY 3 2/11/2020 Florida Boyer, OTR GO 1    68661976148 HC OT MANUAL THERAPY EA 15 MIN 2/11/2020 Florida Boyer OTR GO 2    32432706188 HC OT MANUAL THERAPY EA 15 MIN 2/12/2020 Florida Boyer, OTR GO 2               Floridaparis Boyer OTR  2/12/2020

## 2020-02-12 NOTE — PLAN OF CARE
Problem: Patient Care Overview  Goal: Plan of Care Review  Flowsheets (Taken 2/12/2020 1320)  Plan of Care Reviewed With: patient  Note:   OT Lymph:  Pt. Tolerate the bandages well, (B)LE have decreased in size Mild with (R) and Mod with (L).  (L) has old DVT which has caused the difference.  Pt.  continues to benefit from LE wrapping.  OT will wrap Mon - Fri and RN and Sat & Sun.  Pt. May benefit from [facility] referral if the problems continue at the time of D/C

## 2020-02-12 NOTE — PROGRESS NOTES
Name: Jose Hurtado ADMIT: 2/10/2020   : 1950  PCP: Brandin Bolton MD    MRN: [id number] LOS: 2 days   AGE/SEX: 70 y.o. male  ROOM: [facility]   Subjective   Chief Complaint   Patient presents with   • Weakness - Generalized   • Flu Symptoms     Dyspnea fair  Cough fair  On IV abx  Chronic swelling  Had esophagram yesterday    ROS  No f/c  No n/v  No cp/palp  +soa/cough    Objective   Vital Signs  Temp:  [98.1 °F (36.7 °C)-98.4 °F (36.9 °C)] 98.1 °F (36.7 °C)  Heart Rate:  [73-87] 76  Resp:  [18-20] 18  BP: (130-149)/(66-75) 147/75  SpO2:  [94 %-100 %] 100 %  on   ;   Device (Oxygen Therapy): room air  Body mass index is 24.24 kg/m².    Physical Exam   Constitutional: He is oriented to person, place, and time. He appears well-developed and well-nourished. No distress.   HENT:   Head: Normocephalic and atraumatic.   Mouth/Throat: Oropharynx is clear and moist.   Eyes: Conjunctivae are normal. No scleral icterus.   Neck: Normal range of motion. Neck supple.   Cardiovascular: Normal rate, regular rhythm and normal heart sounds.   Pulmonary/Chest: Effort normal. No respiratory distress. He has rales (slight).   Abdominal: Soft. Bowel sounds are normal. There is no tenderness.   Musculoskeletal: He exhibits edema (3+ LE edema). He exhibits no deformity.   Neurological: He is alert and oriented to person, place, and time.   Skin: Skin is warm and dry. He is not diaphoretic.   Psychiatric: He has a normal mood and affect. His behavior is normal. Thought content normal.   Nursing note and vitals reviewed.      Results Review:       I reviewed the patient's new clinical results.  Results from last 7 days   Lab Units 20  0544 20  0439 02/10/20  1251   WBC 10*3/mm3 5.43 7.21 4.71   HEMOGLOBIN g/dL 7.8* 7.8* 10.1*   PLATELETS 10*3/mm3 161 155 198     Results from last 7 days   Lab Units 20  0544 20  0439 02/10/20  1251 20  1043   SODIUM mmol/L 138 139 141 140   POTASSIUM mmol/L 4.1 4.1 4.8  4.7   CHLORIDE mmol/L 103 104 104 103   CO2 mmol/L 24.6 25.5 26.0 27.2   BUN mg/dL 14 20 22 26*   CREATININE mg/dL 1.00 1.05 1.23 1.48*   GLUCOSE mg/dL 89 117* 98 113*   Estimated Creatinine Clearance: 87.6 mL/min (by C-G formula based on SCr of 1 mg/dL).  Results from last 7 days   Lab Units 02/10/20  1251 02/07/20  1043   ALBUMIN g/dL 3.70 3.50   BILIRUBIN mg/dL 0.3 0.2   ALK PHOS U/L 112 117   AST (SGOT) U/L 36 52*   ALT (SGPT) U/L 23 25     Results from last 7 days   Lab Units 02/12/20  0544 02/11/20  0439 02/10/20  1251 02/07/20  1043   CALCIUM mg/dL 8.4* 8.3* 9.0 9.0   ALBUMIN g/dL  --   --  3.70 3.50   MAGNESIUM mg/dL  --  2.0  --   --    PHOSPHORUS mg/dL  --  2.3*  --   --      Results from last 7 days   Lab Units 02/11/20  0028 02/10/20  1251   PROCALCITONIN ng/mL 1.37*  --    LACTATE mmol/L  --  1.0       Coag   Results from last 7 days   Lab Units 02/12/20  0543 02/11/20  0439 02/10/20  2048 02/10/20  1251   INR  2.93* 4.22* 3.76* 3.04*     HbA1C   Lab Results   Component Value Date    HGBA1C 6.20 (H) 01/17/2020     Infection   Results from last 7 days   Lab Units 02/11/20  0028 02/10/20  1531 02/10/20  1251   BLOODCX   --  No growth at 24 hours No growth at 24 hours   PROCALCITONIN ng/mL 1.37*  --   --      Radiology(recent) Xr Chest 2 View    Result Date: 2/10/2020  Persistent right pleural effusion. Increased opacity at the right lower lung suggesting atelectasis/infiltrate, follow-up recommended.  This report was finalized on 2/10/2020 1:48 PM by Dr. Sumanth Boyer M.D.      Troponin T   Date Value Ref Range Status   02/11/2020 0.039 (C) 0.000 - 0.030 ng/mL Final     No components found for: TSH;2      azithromycin 500 mg Intravenous Q24H   cefTRIAXone 2 g Intravenous Q24H   ferrous sulfate 325 mg Oral QAM AC   furosemide 20 mg Oral Daily   guaiFENesin 1,200 mg Oral Q12H   ipratropium-albuterol 3 mL Nebulization 4x Daily - RT   metroNIDAZOLE 500 mg Intravenous Q8H   PARoxetine 40 mg Oral QAM      potassium chloride 10 mEq Oral Daily   pramipexole 0.5 mg Oral BID   tamsulosin 0.4 mg Oral Nightly   traZODone 100 mg Oral Nightly   warfarin 1.5 mg Oral Daily       Pharmacy to dose warfarin    Diet Regular; Cardiac, Low Sodium; 2,000 mg Na      Assessment/Plan      Active Hospital Problems    Diagnosis  POA   • Elevated troponin [R79.89]  Yes   • Pneumonia of right lower lobe due to infectious organism (CMS/HCC) [J18.1]  Yes   • Iron deficiency [E61.1]  Yes   • Lymphedema [I89.0]  Yes   • Long-term (current) use of anticoagulants, INR goal 2.0-3.0 [Z79.01]  Not Applicable   • Postsurgical malabsorption [K91.2]  Yes   • Peripheral polyneuropathy [G62.9]  Yes   • Anemia [D64.9]  Yes   • Anti-phospholipid syndrome (CMS/HCC) [D68.61]  Yes   • Hypertension [I10]  Yes   • RLS (restless legs syndrome) [G25.81]  Yes      Resolved Hospital Problems   No resolved problems to display.       ·  Pneumonia of right lower lobe due to infectious organism with pleural effusion  --Bld cultures,  sputum cultures, IV antibiotics   -Incentive spirometer  -Recommend Follow-up Chest X-ray  -Pulmonary following    ·  Abnormal esophagram. Will consult GI    · Pleural effusion-echo last month showed an EF of 72%  -Continue medical management  -Patient on low-dose diuretics  -Supplemental oxygen PRN  -Pulmonary has seen and feel thoracentesis unlikely beneficial or possible     ·   Lymphedema  -Wraps       ·     Anti-phospholipid syndrome /Long-term (current) use of anticoagulants, INR goal 2.0-3.0  -Stable, continue Coumadin-monitor INR and adjust as needed the azithromycin will interact      ·   RLS (restless legs syndrome)  -on  Mirapex and continue-  (Dose was initially decreased )     · Chronic King   -continue catheter care     ·   Postsurgical malabsorption/ Iron deficiency/ Hypertension/ Peripheral polyneuropathy  -Stable, continue medical management     -Anemia  No evidece for current bleeding.   Labs consistent with anemia of  chronic disease, monitor    -Elevated troponin  No symptoms of chest pain. Monitor but no further workup at this point.     +DVT proph: scd's  + Full code      DW RN  Potential discharge in 1-2 days    reviewed previous records    Juanpablo Garg MD  [facility]  02/12/20  10:29 AM

## 2020-02-12 NOTE — PLAN OF CARE
Problem: Patient Care Overview  Goal: Plan of Care Review  Outcome: Ongoing (interventions implemented as appropriate)  Flowsheets (Taken 2/12/2020 0353)  Progress: no change  Plan of Care Reviewed With: patient  Outcome Summary: Pt has no c/o pain, IV abx continue, chronic lama in place. No s/s of distress noted will continue to monitor     Problem: Pain, Chronic (Adult)  Goal: Identify Related Risk Factors and Signs and Symptoms  Outcome: Ongoing (interventions implemented as appropriate)     Problem: Skin Injury Risk (Adult)  Goal: Skin Health and Integrity  Outcome: Ongoing (interventions implemented as appropriate)     Problem: Fall Risk (Adult)  Goal: Identify Related Risk Factors and Signs and Symptoms  Outcome: Ongoing (interventions implemented as appropriate)

## 2020-02-12 NOTE — PROGRESS NOTES
Pharmacy Consult: Warfarin Dosing/ Monitoring    Jose Hurtado is a 70 y.o. male, estimated creatinine clearance is 87.6 mL/min (by C-G formula based on SCr of 1 mg/dL). weighing 92.7 kg (204 lb 6.4 oz).    He has a past medical history of Anemia, Anti-phospholipid syndrome (CMS/HCC), Bladder disorder, Community acquired pneumonia, Deep venous thrombosis (CMS/HCC) (, ), Depression, Esophageal carcinoma (CMS/HCC) (2014), Hemorrhoids, HH (hiatus hernia), History of atrial fibrillation (), History of kidney stones, History of nephrolithiasis, History of pancreatitis, History of radiation therapy, Hypertension, Long-term (current) use of anticoagulants, INR goal 2.0-3.0, Lymphedema, Malignant neoplasm of prostate (CMS/HCC), Other hyperlipidemia (2018), Restless legs syndrome, Shortness of breath, and Squamous carcinoma.    Social History     Tobacco Use    Smoking status: Former Smoker     Packs/day: 2.00     Years: 3.00     Pack years: 6.00     Types: Cigarettes     Last attempt to quit: 1974     Years since quittin.1    Smokeless tobacco: Never Used    Tobacco comment: no caffeine    Substance Use Topics    Alcohol use: Yes     Frequency: 2-3 times a week     Comment: 2-3 x per week    Drug use: No       Results from last 7 days   Lab Units 20  0544 20  0543 20  0439 02/10/20  2048 02/10/20  1251 20  1043 20  1122   INR   --  2.93* 4.22* 3.76* 3.04*  --   --    HEMOGLOBIN g/dL 7.8*  --  7.8*  --  10.1*  --  9.2*   HEMATOCRIT % 23.6*  --  23.6*  --  31.2* 29.1* 28.9*   PLATELETS 10*3/mm3 161  --  155  --  198  --  187     Results from last 7 days   Lab Units 20  0544 20  0439 02/10/20  1251 20  1043   SODIUM mmol/L 138 139 141 140   POTASSIUM mmol/L 4.1 4.1 4.8 4.7   CHLORIDE mmol/L 103 104 104 103   CO2 mmol/L 24.6 25.5 26.0 27.2   BUN mg/dL 14 20 22 26*   CREATININE mg/dL 1.00 1.05 1.23 1.48*   CALCIUM mg/dL 8.4* 8.3* 9.0 9.0   BILIRUBIN  mg/dL  --   --  0.3 0.2   ALK PHOS U/L  --   --  112 117   ALT (SGPT) U/L  --   --  23 25   AST (SGOT) U/L  --   --  36 52*   GLUCOSE mg/dL 89 117* 98 113*     Anticoagulation history: warfarin 2.5 mg every M/W/F/Sun; 5 mg all other days     Hospital Anticoagulation:  Consulting provider: Dr. Fiona Barba MD  Start date: Prior to admission   Indication: antiphospholipid syndrome   Target INR: 2-3  Expected duration: indefinite   Bridge Therapy: No                Date 2/10 2/11 2/12          INR 3.04 4.22 2.93          Warfarin dose HOLD HOLD 1.5mg            Potential drug interactions: paroxetine  Metronidazole--major DDI, significant increase in INR  azithromycin--potential increased anticoagulant effects of warfarin    Relevant nutrition status: regular diet, cardiac low sodium    Education complete: Yes  Date: 2/11/2020    Assessment/Plan:  INR therapeutic today, large interval decrease after held doses x 2.  Addition of flagyl complicates INR management as this is a major DDI - would empirically reduce warfarin requirements ~50% for duration of flagyl. Home dose of warfarin was 25mg weekly - unclear if this was ideal baseline dose as pt presented with high INR.  Resume warfarin tonight at 1.5mg daily (provides 10.5mg weekly) and monitor daily INR.  Monitor s/sx of bleeding.  H/H stably low this am.    Pharmacy will continue to follow until discharge or discontinuation of warfarin.   Carmen Bowles [facility]  2/12/2020

## 2020-02-12 NOTE — CONSULTS
[facility]  Initial Inpatient Consult Note    Referring Provider: A    Reason for Consultation: Abnormal esophagram    Subjective     History of present illness:    70 y.o. male, followed as an outpatient by Dr. Millan, that we are asked to see for abnormal esophagram.    Patient was admitted 2 days ago with increasing shortness of air, weakness and fever.  Patient's past medical history significant for antiphospholipid antibody syndrome and he is on chronic anticoagulation for this as well as A. fib with Coumadin.  Patient has had a prior esophagectomy in 2015 due to a history of esophageal cancer.    Esophagram 2/11/2020 showed large amount of food material versus bezoar in the stomach.  Gastric reflux into the upper thoracic esophagus was noted.  Patient is not on acid suppression at home.  She had a known history of gastric reflux and Pardo's prior to his diagnosis of esophageal cancer.    Patient denies typical heartburn symptoms.  He does report episodic periods of coughing to the point that he will spit up what looks like phlegm.  He says it tastes like stomach acid.  The symptoms happens after eating.  He will sometimes retch.  He does not feel nauseated and he does not vomit.  He has no trouble swallowing.  He seen no blood in his stools.  He has had 20 pounds of weight loss recently with effort.  He has had no trouble managing his weight since his esophagectomy.    Past Medical History:  Past Medical History:   Diagnosis Date   • Anemia    • Anti-phospholipid syndrome (CMS/HCC)     On lifelong anticoagulation therapy   • Bladder disorder     LEAKAGE   ON MED  wers pads   • Community acquired pneumonia     HISTORY OF IN 2014   • Deep venous thrombosis (CMS/HCC) 2006, 2008    Left lower extremity multiple   • Depression    • Esophageal carcinoma (CMS/HCC) 12/31/2014    had chemo and radiation prior surgery   • Hemorrhoids    • HH (hiatus hernia)    • History of atrial  fibrillation 2015    ONE EPISODE WHILE HOSPITALIZED   • History of kidney stones    • History of nephrolithiasis    • History of pancreatitis     PT STATES MANY YEARS AGO   • History of radiation therapy    • Hypertension    • Long-term (current) use of anticoagulants, INR goal 2.0-3.0    • Lymphedema    • Malignant neoplasm of prostate (CMS/HCC)    • Other hyperlipidemia 1/30/2018 January 30, 2018 lipid panel risk 12.8%   • Restless legs syndrome    • Shortness of breath    • Squamous carcinoma     on the head     Past Surgical History:  Past Surgical History:   Procedure Laterality Date   • APPENDECTOMY  1950   • BRONCHOSCOPY      (Diagnostic)   • CATARACT EXTRACTION Bilateral 2014   • COLONOSCOPY  12/15/2014    Complete / Description: EH, IH, torts, stool, follow-up colonoscopy due in 5 years.   • COLONOSCOPY N/A 6/13/2017    non-thrombosed external hemorrhoids, normal examined ileum, IH   • COLONOSCOPY N/A 2/26/2019    Procedure: COLONOSCOPY with Cold Polypectomy;  Surgeon: Mulugeta Millan MD;  Location: [facility] ENDOSCOPY;  Service: Gastroenterology   • CYSTOSCOPY W/ LASER LITHOTRIPSY     • ENDOSCOPY N/A 6/13/2017    Procedure: ESOPHAGOGASTRODUODENOSCOPY WITH COLD BIOPSY;  Surgeon: Lake Gonzalez MD;  Location: [facility] ENDOSCOPY;  Service:    • ESOPHAGECTOMY      April 2015, stage IIB esophageal carcinoma, sub-total resection.   • ESOPHAGECTOMY      Esophagectomy Subtotal [place] Joe Procedure   • EXCISION LESION  08/2012    Removal of Squamous Cell CA on Head   • HAMMER TOE REPAIR  09/2014    Hammertoe Operation (Each Toe), 10/2014   • HAMMER TOE REPAIR Left 10/3/2017    Procedure: Left second third and fourth distal interphalangeal joint resection with flexor tenotomy;  Surgeon: Mulugeta Lira MD;  [place] [facility] [place] [facility] [place]  Service:    • HERNIA REPAIR      incisional   • JEJUNOSTOMY      Laparoscopic   • JEJUNOSTOMY      tube removal    • KNEE SURGERY Bilateral 1967, 1973, 1981   • PATELLA  SURGERY Left     removed   • PILONIDAL CYST / SINUS EXCISION     • WI TOTAL KNEE ARTHROPLASTY Right 3/26/2018    Procedure: TOTAL KNEE ARTHROPLASTY;  Surgeon: Renny Solis MD;  Location: [facility] OR;  Service: Orthopedics   • PROSTATECTOMY     • PYLOROPLASTY     • SPINAL FUSION  1998    C 5,6   • TONSILLECTOMY     • TONSILLECTOMY     • UPPER GASTROINTESTINAL ENDOSCOPY  12/15/2014    LA Grade D esophagitis, Pardo's, HH, multiple duodenal ulcers   • VENTRAL/INCISIONAL HERNIA REPAIR N/A 2016    Procedure: VENTRAL/INCISIONAL HERNIA REPAIR, open, with mesh, and component separation;  Surgeon: Darren Rivas MD;  Location: [facility] OR;  Service:       Social History:   Social History     Tobacco Use   • Smoking status: Former Smoker     Packs/day: 2.00     Years: 3.00     Pack years: 6.00     Types: Cigarettes     Last attempt to quit: 1974     Years since quittin.1   • Smokeless tobacco: Never Used   • Tobacco comment: no caffeine    Substance Use Topics   • Alcohol use: Yes     Frequency: 2-3 times a week     Comment: 2-3 x per week      Family History:  Family History   Problem Relation Age of Onset   • Cerebral aneurysm Mother         cerebral artery aneurysm ( age 56)   • Prostate cancer Brother 68   • Anxiety disorder Father    • Suicide Attempts Father          of suicide   • Cancer Father         bladder   • No Known Problems Brother    • Colon cancer Neg Hx    • Esophageal cancer Neg Hx    • Dementia Neg Hx        Home Meds:  Facility-Administered Medications Prior to Admission   Medication Dose Route Frequency Provider Last Rate Last Dose   • cyanocobalamin injection 1,000 mcg  1,000 mcg Intramuscular Q28 Days Brandin Bolton MD   1,000 mcg at 20 1109     Medications Prior to Admission   Medication Sig Dispense Refill Last Dose   • albuterol sulfate  (90 Base) MCG/ACT inhaler    Taking   • furosemide (LASIX) 20 MG tablet Take 1 tablet by mouth Daily. 30  tablet 0 Taking   • PARoxetine (PAXIL) 40 MG tablet Take 1 tablet by mouth Every Morning. 30 tablet 3 Taking   • potassium chloride (K-DUR) 10 MEQ CR tablet Take 1 tablet by mouth Daily. Only while taking furosemide. 30 tablet 0 Taking   • pramipexole (MIRAPEX) 1.5 MG tablet Take 1 tablet by mouth 2 (Two) Times a Day. 180 tablet 1 Taking   • STIOLTO RESPIMAT 2.5-2.5 MCG/ACT aerosol solution inhaler    Taking   • tamsulosin (FLOMAX) 0.4 MG capsule 24 hr capsule Take 1 capsule by mouth Every Night.   Taking   • traZODone (DESYREL) 100 MG tablet Take 1 tablet by mouth Every Night for 180 days. 30 tablet 5 Taking   • warfarin (COUMADIN) 5 MG tablet 2.5 mg Monday, Wednesday, Friday, Sunday ; 5 mg on Tuesday, Thursday, Saturday 90 tablet 2 Taking   • ferrous sulfate 325 (65 FE) MG tablet TAKE ONE TABLET BY MOUTH DAILY WITH BREAKFAST (Patient taking differently: TAKE TWO TABLET BY MOUTH DAILY WITH BREAKFAST) 90 tablet 0 Taking   • metoprolol tartrate (LOPRESSOR) 25 MG tablet Take 1 tablet by mouth Every 12 (Twelve) Hours. 60 tablet 0 Taking     Current Meds:     azithromycin 500 mg Intravenous Q24H   cefTRIAXone 2 g Intravenous Q24H   ferrous sulfate 325 mg Oral QAM AC   furosemide 20 mg Oral Daily   guaiFENesin 1,200 mg Oral Q12H   ipratropium-albuterol 3 mL Nebulization 4x Daily - RT   metroNIDAZOLE 500 mg Intravenous Q8H   PARoxetine 40 mg Oral QAM   potassium chloride 10 mEq Oral Daily   pramipexole 0.5 mg Oral BID   tamsulosin 0.4 mg Oral Nightly   traZODone 100 mg Oral Nightly   warfarin 1.5 mg Oral Daily     Allergies:  No Known Allergies  Review of Systems  Pertinent items are noted in HPI, all other systems reviewed and negative     Objective     Vital Signs  Temp:  [98.1 °F (36.7 °C)-98.4 °F (36.9 °C)] 98.1 °F (36.7 °C)  Heart Rate:  [73-87] 76  Resp:  [18-20] 18  BP: (130-149)/(66-75) 147/75  Physical Exam:  General Appearance:    Alert, cooperative, in no acute distress   Head:    Normocephalic, without obvious  abnormality, atraumatic   Eyes:          conjunctivae and sclerae normal, no   icterus   Throat:   no thrush, oral mucosa moist   Neck:   Supple, no adenopathy   Lungs:     Clear to auscultation bilaterally    Heart:    Regular rhythm and normal rate    Chest Wall:    No abnormalities observed   Abdomen:     Soft, nondistended, nontender; normal bowel sounds   Extremities:  Chronic lower extremity edema   Skin:  Chronic lower extremity edema with bilateral wraps in place   Psychiatric:  normal mood and insight     Results Review:   I reviewed the patient's new clinical results.    Results from last 7 days   Lab Units 02/12/20  0544 02/11/20  0439 02/10/20  1251   WBC 10*3/mm3 5.43 7.21 4.71   HEMOGLOBIN g/dL 7.8* 7.8* 10.1*   HEMATOCRIT % 23.6* 23.6* 31.2*   PLATELETS 10*3/mm3 161 155 198     Results from last 7 days   Lab Units 02/12/20  0544 02/11/20  0439 02/10/20  1251 02/07/20  1043   SODIUM mmol/L 138 139 141 140   POTASSIUM mmol/L 4.1 4.1 4.8 4.7   CHLORIDE mmol/L 103 104 104 103   CO2 mmol/L 24.6 25.5 26.0 27.2   BUN mg/dL 14 20 22 26*   CREATININE mg/dL 1.00 1.05 1.23 1.48*   CALCIUM mg/dL 8.4* 8.3* 9.0 9.0   BILIRUBIN mg/dL  --   --  0.3 0.2   ALK PHOS U/L  --   --  112 117   ALT (SGPT) U/L  --   --  23 25   AST (SGOT) U/L  --   --  36 52*   GLUCOSE mg/dL 89 117* 98 113*     Results from last 7 days   Lab Units 02/12/20  0543 02/11/20  0439 02/10/20  2048   INR  2.93* 4.22* 3.76*     Lab Results   Lab Value Date/Time    LIPASE 18 08/30/2016 2057       Radiology:  FL Esophagram Complete   Final Result      XR Chest 2 View   Final Result   Persistent right pleural effusion. Increased opacity at the   right lower lung suggesting atelectasis/infiltrate, follow-up   recommended.       This report was finalized on 2/10/2020 1:48 PM by Dr. Sumanth Boyer M.D.              Assessment/Plan   Patient Active Problem List   Diagnosis   • Adenocarcinoma of esophagus (CMS/HCC)   • Adenomatous polyp of colon   •  Malignant neoplasm of prostate (CMS/HCC)   • RLS (restless legs syndrome)   • Depression   • Hypertension   • Anti-phospholipid syndrome (CMS/HCC)   • Anemia   • Insomnia due to other mental disorder   • Peripheral polyneuropathy   • B12 deficiency   • Postsurgical malabsorption   • Shortness of breath   • Long-term (current) use of anticoagulants, INR goal 2.0-3.0   • Lymphedema   • Pneumonia of right lower lobe due to infectious organism (CMS/HCC)   • Iron deficiency   • Elevated troponin       Assessment:  1. GERD-demonstrated on esophagram.  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 Retained gastric contents on esophagram-concern for delayed gastric emptying contributing to symptoms  3. History of esophageal cancer status post esophagectomy  4. A. fib/antiphospholipid antibody syndrome on Coumadin    Plan:  · Discussed the importance of acid suppression given his history, continued GERD-we will start twice daily PPI  · Discussed GERD diet and lifestyle modification  · Concern for gastric emptying delay based on his esophagram.  He may have some degree of postsurgical gastroparesis.  We will plan for gastric emptying study tomorrow for further evaluation.  · We will give Reglan for a few doses after his gastric emptying study to clear his stomach  · Liquid diet until GES      I discussed the patients findings and my recommendations with patient and family.    Mesha Sanchez MD

## 2020-02-13 ENCOUNTER — APPOINTMENT (OUTPATIENT)
Dept: NUCLEAR MEDICINE | Facility: HOSPITAL | Age: 70
End: 2020-02-13

## 2020-02-13 LAB
ANION GAP SERPL CALCULATED.3IONS-SCNC: 10.5 MMOL/L (ref 5–15)
BASOPHILS # BLD AUTO: 0.01 10*3/MM3 (ref 0–0.2)
BASOPHILS NFR BLD AUTO: 0.2 % (ref 0–1.5)
BUN BLD-MCNC: 14 MG/DL (ref 8–23)
BUN/CREAT SERPL: 15.1 (ref 7–25)
CALCIUM SPEC-SCNC: 8.1 MG/DL (ref 8.6–10.5)
CHLORIDE SERPL-SCNC: 103 MMOL/L (ref 98–107)
CO2 SERPL-SCNC: 24.5 MMOL/L (ref 22–29)
CREAT BLD-MCNC: 0.93 MG/DL (ref 0.76–1.27)
DEPRECATED RDW RBC AUTO: 44.6 FL (ref 37–54)
EOSINOPHIL # BLD AUTO: 0.28 10*3/MM3 (ref 0–0.4)
EOSINOPHIL NFR BLD AUTO: 5.7 % (ref 0.3–6.2)
ERYTHROCYTE [DISTWIDTH] IN BLOOD BY AUTOMATED COUNT: 14.1 % (ref 12.3–15.4)
GFR SERPL CREATININE-BSD FRML MDRD: 80 ML/MIN/1.73
GLUCOSE BLD-MCNC: 89 MG/DL (ref 65–99)
HCT VFR BLD AUTO: 24.9 % (ref 37.5–51)
HGB BLD-MCNC: 8.1 G/DL (ref 13–17.7)
IMM GRANULOCYTES # BLD AUTO: 0.02 10*3/MM3 (ref 0–0.05)
IMM GRANULOCYTES NFR BLD AUTO: 0.4 % (ref 0–0.5)
INR PPP: 2.05 (ref 0.9–1.1)
LYMPHOCYTES # BLD AUTO: 0.58 10*3/MM3 (ref 0.7–3.1)
LYMPHOCYTES NFR BLD AUTO: 11.7 % (ref 19.6–45.3)
MCH RBC QN AUTO: 28.5 PG (ref 26.6–33)
MCHC RBC AUTO-ENTMCNC: 32.5 G/DL (ref 31.5–35.7)
MCV RBC AUTO: 87.7 FL (ref 79–97)
MONOCYTES # BLD AUTO: 0.4 10*3/MM3 (ref 0.1–0.9)
MONOCYTES NFR BLD AUTO: 8.1 % (ref 5–12)
NEUTROPHILS # BLD AUTO: 3.65 10*3/MM3 (ref 1.7–7)
NEUTROPHILS NFR BLD AUTO: 73.9 % (ref 42.7–76)
NRBC BLD AUTO-RTO: 0 /100 WBC (ref 0–0.2)
PLATELET # BLD AUTO: 159 10*3/MM3 (ref 140–450)
PMV BLD AUTO: 9.2 FL (ref 6–12)
POTASSIUM BLD-SCNC: 4.1 MMOL/L (ref 3.5–5.2)
PROTHROMBIN TIME: 22.8 SECONDS (ref 11.7–14.2)
RBC # BLD AUTO: 2.84 10*6/MM3 (ref 4.14–5.8)
SODIUM BLD-SCNC: 138 MMOL/L (ref 136–145)
WBC NRBC COR # BLD: 4.94 10*3/MM3 (ref 3.4–10.8)

## 2020-02-13 PROCEDURE — 85610 PROTHROMBIN TIME: CPT | Performed by: INTERNAL MEDICINE

## 2020-02-13 PROCEDURE — 94799 UNLISTED PULMONARY SVC/PX: CPT

## 2020-02-13 PROCEDURE — 85025 COMPLETE CBC W/AUTO DIFF WBC: CPT | Performed by: INTERNAL MEDICINE

## 2020-02-13 PROCEDURE — 80048 BASIC METABOLIC PNL TOTAL CA: CPT | Performed by: INTERNAL MEDICINE

## 2020-02-13 PROCEDURE — 25010000002 METOCLOPRAMIDE PER 10 MG: Performed by: INTERNAL MEDICINE

## 2020-02-13 PROCEDURE — 99232 SBSQ HOSP IP/OBS MODERATE 35: CPT | Performed by: INTERNAL MEDICINE

## 2020-02-13 PROCEDURE — 0 TECHNETIUM SULFUR COLLOID: Performed by: HOSPITALIST

## 2020-02-13 PROCEDURE — 78264 GASTRIC EMPTYING IMG STUDY: CPT

## 2020-02-13 PROCEDURE — A9541 TC99M SULFUR COLLOID: HCPCS | Performed by: HOSPITALIST

## 2020-02-13 PROCEDURE — 97110 THERAPEUTIC EXERCISES: CPT

## 2020-02-13 PROCEDURE — 97140 MANUAL THERAPY 1/> REGIONS: CPT

## 2020-02-13 RX ORDER — METOCLOPRAMIDE HYDROCHLORIDE 5 MG/ML
5 INJECTION INTRAMUSCULAR; INTRAVENOUS EVERY 8 HOURS SCHEDULED
Status: COMPLETED | OUTPATIENT
Start: 2020-02-13 | End: 2020-02-14

## 2020-02-13 RX ADMIN — GUAIFENESIN 1200 MG: 600 TABLET, EXTENDED RELEASE ORAL at 12:04

## 2020-02-13 RX ADMIN — IPRATROPIUM BROMIDE AND ALBUTEROL SULFATE 3 ML: 2.5; .5 SOLUTION RESPIRATORY (INHALATION) at 14:53

## 2020-02-13 RX ADMIN — GUAIFENESIN 1200 MG: 600 TABLET, EXTENDED RELEASE ORAL at 20:20

## 2020-02-13 RX ADMIN — PANTOPRAZOLE SODIUM 40 MG: 40 TABLET, DELAYED RELEASE ORAL at 17:59

## 2020-02-13 RX ADMIN — SODIUM CHLORIDE, PRESERVATIVE FREE 10 ML: 5 INJECTION INTRAVENOUS at 20:29

## 2020-02-13 RX ADMIN — TECHNETIUM TC 99M SULFUR COLLOID 1 DOSE: KIT at 06:45

## 2020-02-13 RX ADMIN — IPRATROPIUM BROMIDE AND ALBUTEROL SULFATE 3 ML: 2.5; .5 SOLUTION RESPIRATORY (INHALATION) at 21:47

## 2020-02-13 RX ADMIN — AMOXICILLIN AND CLAVULANATE POTASSIUM 1 TABLET: 875; 125 TABLET, FILM COATED ORAL at 20:20

## 2020-02-13 RX ADMIN — AMOXICILLIN AND CLAVULANATE POTASSIUM 1 TABLET: 875; 125 TABLET, FILM COATED ORAL at 12:09

## 2020-02-13 RX ADMIN — METOCLOPRAMIDE 5 MG: 5 INJECTION, SOLUTION INTRAMUSCULAR; INTRAVENOUS at 18:00

## 2020-02-13 RX ADMIN — FUROSEMIDE 20 MG: 20 TABLET ORAL at 12:11

## 2020-02-13 RX ADMIN — PRAMIPEXOLE DIHYDROCHLORIDE 0.5 MG: 0.5 TABLET ORAL at 20:20

## 2020-02-13 RX ADMIN — TAMSULOSIN HYDROCHLORIDE 0.4 MG: 0.4 CAPSULE ORAL at 20:20

## 2020-02-13 RX ADMIN — WARFARIN 1.5 MG: 3 TABLET ORAL at 17:59

## 2020-02-13 RX ADMIN — POTASSIUM CHLORIDE 10 MEQ: 750 CAPSULE, EXTENDED RELEASE ORAL at 12:11

## 2020-02-13 RX ADMIN — ACETAMINOPHEN 650 MG: 325 TABLET, FILM COATED ORAL at 20:27

## 2020-02-13 RX ADMIN — TRAZODONE HYDROCHLORIDE 100 MG: 100 TABLET ORAL at 20:20

## 2020-02-13 NOTE — THERAPY TREATMENT NOTE
Patient Name: Jose Hurtado  : 1950    MRN: [id number]                              Today's Date: 2020       Admit Date: 2/10/2020    Visit Dx:     ICD-10-CM ICD-9-CM   1. Pneumonia of right lower lobe due to infectious organism (CMS/HCC) J18.1 486   2. Rigors R68.89 780.99     Patient Active Problem List   Diagnosis   • Adenocarcinoma of esophagus (CMS/HCC)   • Adenomatous polyp of colon   • Malignant neoplasm of prostate (CMS/HCC)   • RLS (restless legs syndrome)   • Depression   • Hypertension   • Anti-phospholipid syndrome (CMS/HCC)   • Anemia   • Insomnia due to other mental disorder   • Peripheral polyneuropathy   • B12 deficiency   • Postsurgical malabsorption   • Shortness of breath   • Long-term (current) use of anticoagulants, INR goal 2.0-3.0   • Lymphedema   • Pneumonia of right lower lobe due to infectious organism (CMS/HCC)   • Iron deficiency   • Elevated troponin     Past Medical History:   Diagnosis Date   • Anemia    • Anti-phospholipid syndrome (CMS/HCC)     On lifelong anticoagulation therapy   • Bladder disorder     LEAKAGE   ON MED  wers pads   • Community acquired pneumonia     HISTORY OF IN    • Deep venous thrombosis (CMS/HCC) ,     Left lower extremity multiple   • Depression    • Esophageal carcinoma (CMS/HCC) 2014    had chemo and radiation prior surgery   • Hemorrhoids    • HH (hiatus hernia)    • History of atrial fibrillation 2015    ONE EPISODE WHILE HOSPITALIZED   • History of kidney stones    • History of nephrolithiasis    • History of pancreatitis     PT STATES MANY YEARS AGO   • History of radiation therapy    • Hypertension    • Long-term (current) use of anticoagulants, INR goal 2.0-3.0    • Lymphedema    • Malignant neoplasm of prostate (CMS/HCC)    • Other hyperlipidemia 2018 lipid panel risk 12.8%   • Restless legs syndrome    • Shortness of breath    • Squamous carcinoma     on the head     Past Surgical History:    Procedure Laterality Date   • APPENDECTOMY  1950   • BRONCHOSCOPY      (Diagnostic)   • CATARACT EXTRACTION Bilateral 2014   • COLONOSCOPY  12/15/2014    Complete / Description: EH, IH, torts, stool, follow-up colonoscopy due in 5 years.   • COLONOSCOPY N/A 6/13/2017    non-thrombosed external hemorrhoids, normal examined ileum, IH   • COLONOSCOPY N/A 2/26/2019    Procedure: COLONOSCOPY with Cold Polypectomy;  Surgeon: Mulugeta Millan MD;  Location: [facility] ENDOSCOPY;  Service: Gastroenterology   • CYSTOSCOPY W/ LASER LITHOTRIPSY     • ENDOSCOPY N/A 6/13/2017    Procedure: ESOPHAGOGASTRODUODENOSCOPY WITH COLD BIOPSY;  Surgeon: Lake Gonzalez MD;  Location: [facility] ENDOSCOPY;  Service:    • ESOPHAGECTOMY      April 2015, stage IIB esophageal carcinoma, sub-total resection.   • ESOPHAGECTOMY      Esophagectomy Subtotal [place] [facility] Procedure   • EXCISION LESION  08/2012    Removal of Squamous Cell CA on Head   • HAMMER TOE REPAIR  09/2014    Hammertoe Operation (Each Toe), 10/2014   • HAMMER TOE REPAIR Left 10/3/2017    Procedure: Left second third and fourth distal interphalangeal joint resection with flexor tenotomy;  Surgeon: Mulugeta Lira MD;  Location: [facility] OR [facility] [place];  Service:    • HERNIA REPAIR      incisional   • JEJUNOSTOMY      Laparoscopic   • JEJUNOSTOMY      tube removal    • KNEE SURGERY Bilateral 1967, 1973, 1981   • PATELLA SURGERY Left     removed   • PILONIDAL CYST / SINUS EXCISION     • HI TOTAL KNEE ARTHROPLASTY Right 3/26/2018    Procedure: TOTAL KNEE ARTHROPLASTY;  Surgeon: Renny Solis MD;  Location: [facility] MAIN OR;  Service: Orthopedics   • PROSTATECTOMY  2010   • PYLOROPLASTY     • SPINAL FUSION  02/1998    C 5,6   • TONSILLECTOMY  1955   • TONSILLECTOMY     • UPPER GASTROINTESTINAL ENDOSCOPY  12/15/2014    LA Grade D esophagitis, Pardo's, HH, multiple duodenal ulcers   • VENTRAL/INCISIONAL HERNIA REPAIR N/A 4/14/2016    Procedure: VENTRAL/INCISIONAL HERNIA REPAIR, open,  with mesh, and component separation;  Surgeon: Darren Rivas MD;  Location: [facility] MAIN OR;  Service:      General Information     Row Name 02/13/20 1611          PT Evaluation Time/Intention    Document Type  therapy note (daily note)  -     Mode of Treatment  physical therapy;individual therapy  -     Row Name 02/13/20 1611          General Information    Existing Precautions/Restrictions  fall  -     Row Name 02/13/20 1611          Cognitive Assessment/Intervention- PT/OT    Orientation Status (Cognition)  oriented x 4  -     Row Name 02/13/20 1611          Safety Issues, Functional Mobility    Impairments Affecting Function (Mobility)  strength  -       User Key  (r) = Recorded By, (t) = Taken By, (c) = Cosigned By    Initials Name Provider Type     Bernadette Henriquez PTA Physical Therapy Assistant        Mobility     Row Name 02/13/20 1612          Bed Mobility Assessment/Treatment    [place] Level (Bed Mobility)  conditional independence  -     Row Name 02/13/20 1612          Sit-Stand Transfer    Sit-Stand [place] (Transfers)  minimum assist (75% patient effort)  -     Assistive Device (Sit-Stand Transfers)  walker, front-wheeled  -     Row Name 02/13/20 1612          Gait/Stairs Assessment/Training    [place] Level (Gait)  stand by assist  -     Assistive Device (Gait)  walker, front-wheeled  -     Distance in Feet (Gait)  300 no rest breaks  -     Pattern (Gait)  step-through  -     Deviations/Abnormal Patterns (Gait)  jf decreased;stride length decreased  -     Bilateral Gait Deviations  forward flexed posture  -       User Key  (r) = Recorded By, (t) = Taken By, (c) = Cosigned By    Initials Name Provider Type     Bernadette Henriquez PTA Physical Therapy Assistant        Obj/Interventions     Row Name 02/13/20 1613          Therapeutic Exercise    Lower Extremity (Therapeutic Exercise)  gluteal sets;LAQ (long arc quad), bilateral;marching while  seated;SLR (straight leg raise), bilateral  -     Lower Extremity Range of Motion (Therapeutic Exercise)  ankle dorsiflexion/plantar flexion, bilateral  -     Exercise Type (Therapeutic Exercise)  AROM (active range of motion)  -     Position (Therapeutic Exercise)  seated  -     Sets/Reps (Therapeutic Exercise)  X10  -       User Key  (r) = Recorded By, (t) = Taken By, (c) = Cosigned By    Initials Name Provider Type     Bernadette Henriquez PTA Physical Therapy Assistant        Goals/Plan    No documentation.       Clinical Impression     Row Name 02/13/20 1613          Pain Scale: Numbers Pre/Post-Treatment    Pre/Post Treatment Pain Comment  pt c/o pain of left knee  -     Row Name 02/13/20 1613          Plan of Care Review    Plan of Care Reviewed With  patient  -     Progress  improving  -     Outcome Summary  Pt tolerated treatment with c/o pain of left knee and weakness. Pt is independent with bed mobility and required Kenneth for STS transfers. Pt ambulated 300 feet with rwx, SBA. Pt performed several bilateral LE exercises with no complaints of difficulty. Pt states of having walker and cane at home to use if needed. Will continue to progress pt as able.   -[facility] Name 02/13/20 1613          Positioning and Restraints    Pre-Treatment Position  in bed  -     Post Treatment Position  bed  -     In Bed  sitting EOB;call light within reach;encouraged to call for assist;with family/caregiver  -       User Key  (r) = Recorded By, (t) = Taken By, (c) = Cosigned By    Initials Name Provider Type     Bernadette Henriquez PTA Physical Therapy Assistant        Outcome Measures     Row Name 02/13/20 1615          How much help from another person do you currently need...    Turning from your back to your side while in flat bed without using bedrails?  4  -EH     Moving from lying on back to sitting on the side of a flat bed without bedrails?  4  -EH     Moving to and from a bed to a chair (including a  wheelchair)?  3  -EH     Standing up from a chair using your arms (e.g., wheelchair, bedside chair)?  4  -EH     Climbing 3-5 steps with a railing?  3  -EH     To walk in hospital room?  3  -EH     AM-PAC 6 Clicks Score (PT)  21  -       User Key  (r) = Recorded By, (t) = Taken By, (c) = Cosigned By    Initials Name Provider Type     Bernadette Henriquez PTA Physical Therapy Assistant          PT Recommendation and Plan     Plan of Care Reviewed With: patient  Progress: improving  Outcome Summary: Pt tolerated treatment with c/o pain of left knee and weakness. Pt is independent with bed mobility and required Kenneth for STS transfers. Pt ambulated 300 feet with rwx, SBA. Pt performed several bilateral LE exercises with no complaints of difficulty. Pt states of having walker and cane at home to use if needed. Will continue to progress pt as able.      Time Calculation:   PT Charges     Row Name 02/13/20 1610             Time Calculation    Start Time  1524  -      Stop Time  1542  -      Time Calculation (min)  18 min  -      PT Received On  02/13/20  -      PT - Next Appointment  02/14/20  -         Time Calculation- PT    Total Timed Code Minutes- PT  18 minute(s)  -        User Key  (r) = Recorded By, (t) = Taken By, (c) = Cosigned By    Initials Name Provider Type     Bernadette Henriquez PTA Physical Therapy Assistant        Therapy Charges for Today     Code Description Service Date Service Provider Modifiers Qty    17258333002 HC PT THER PROC EA 15 MIN 2/13/2020 Bernadette Henriquez PTA GP 1          PT G-Codes  Outcome Measure Options: AM-PAC 6 Clicks Daily Activity (OT)  AM-PAC 6 Clicks Score (PT): 21  AM-PAC 6 Clicks Score (OT): 23    Bernadette Henriquez PTA  2/13/2020

## 2020-02-13 NOTE — THERAPY TREATMENT NOTE
Acute Care - Occupational Therapy Treatment Note  [facility]     Patient Name: Jose Hurtado  : 1950  MRN: [id number]  Today's Date: 2020             Admit Date: 2/10/2020       ICD-10-CM ICD-9-CM   1. Pneumonia of right lower lobe due to infectious organism (CMS/HCC) J18.1 486   2. Rigors R68.89 780.99     Patient Active Problem List   Diagnosis   • Adenocarcinoma of esophagus (CMS/HCC)   • Adenomatous polyp of colon   • Malignant neoplasm of prostate (CMS/HCC)   • RLS (restless legs syndrome)   • Depression   • Hypertension   • Anti-phospholipid syndrome (CMS/HCC)   • Anemia   • Insomnia due to other mental disorder   • Peripheral polyneuropathy   • B12 deficiency   • Postsurgical malabsorption   • Shortness of breath   • Long-term (current) use of anticoagulants, INR goal 2.0-3.0   • Lymphedema   • Pneumonia of right lower lobe due to infectious organism (CMS/HCC)   • Iron deficiency   • Elevated troponin     Past Medical History:   Diagnosis Date   • Anemia    • Anti-phospholipid syndrome (CMS/HCC)     On lifelong anticoagulation therapy   • Bladder disorder     LEAKAGE   ON MED  wers pads   • Community acquired pneumonia     HISTORY OF IN    • Deep venous thrombosis (CMS/HCC) ,     Left lower extremity multiple   • Depression    • Esophageal carcinoma (CMS/HCC) 2014    had chemo and radiation prior surgery   • Hemorrhoids    • HH (hiatus hernia)    • History of atrial fibrillation 2015    ONE EPISODE WHILE HOSPITALIZED   • History of kidney stones    • History of nephrolithiasis    • History of pancreatitis     PT STATES MANY YEARS AGO   • History of radiation therapy    • Hypertension    • Long-term (current) use of anticoagulants, INR goal 2.0-3.0    • Lymphedema    • Malignant neoplasm of prostate (CMS/HCC)    • Other hyperlipidemia 2018 lipid panel risk 12.8%   • Restless legs syndrome    • Shortness of breath    • Squamous carcinoma     on the head      Past Surgical History:   Procedure Laterality Date   • APPENDECTOMY  1950   • BRONCHOSCOPY      (Diagnostic)   • CATARACT EXTRACTION Bilateral 2014   • COLONOSCOPY  12/15/2014    Complete / Description: EH, IH, torts, stool, follow-up colonoscopy due in 5 years.   • COLONOSCOPY N/A 6/13/2017    non-thrombosed external hemorrhoids, normal examined ileum, IH   • COLONOSCOPY N/A 2/26/2019    Procedure: COLONOSCOPY with Cold Polypectomy;  Surgeon: Mulugeta Millan MD;  Location: [facility];  Service: Gastroenterology   • CYSTOSCOPY W/ LASER LITHOTRIPSY     • ENDOSCOPY N/A 6/13/2017    Procedure: ESOPHAGOGASTRODUODENOSCOPY WITH COLD BIOPSY;  Surgeon: Lake Gonzalez MD;  Location: [facility] ENDOS[facility];  Service:    • ESOPHAGECTOMY      April 2015, stage IIB esophageal carcinoma, sub-total resection.   • ESOPHAGECTOMY      Esophagectomy Subtotal Andrea Joe Procedure   • EXCISION LESION  08/2012    Removal of Squamous Cell CA on Head   • HAMMER TOE REPAIR  09/2014    Hammertoe Operation (Each Toe), 10/2014   • HAMMER TOE REPAIR Left 10/3/2017    Procedure: Left second third and fourth distal interphalangeal joint resection with flexor tenotomy;  Surgeon: Mulugeta Lira MD;  Location: [facility] OR [facility];  Service:    • HERNIA REPAIR      incisional   • JEJUNOSTOMY      Laparoscopic   • JEJUNOSTOMY      tube removal    • KNEE SURGERY Bilateral 1967, 1973, 1981   • PATELLA SURGERY Left     removed   • PILONIDAL CYST / SINUS EXCISION     • NV TOTAL KNEE ARTHROPLASTY Right 3/26/2018    Procedure: TOTAL KNEE ARTHROPLASTY;  Surgeon: Renny Solis MD;  Location: [facility] MAIN OR;  Service: Orthopedics   • PROSTATECTOMY  2010   • PYLOROPLASTY     • SPINAL FUSION  02/1998    C 5,6   • TONSILLECTOMY  1955   • TONSILLECTOMY     • UPPER GASTROINTESTINAL ENDOSCOPY  12/15/2014    LA Grade D esophagitis, Pardo's, HH, multiple duodenal ulcers   • VENTRAL/INCISIONAL HERNIA REPAIR N/A 4/14/2016    Procedure:  "VENTRAL/INCISIONAL HERNIA REPAIR, open, with mesh, and component separation;  Surgeon: Darren Rivas MD;  Location: [facility] MAIN OR;  Service:        Therapy Treatment    Rehabilitation Treatment Summary     Row Name 02/13/20 1400             Treatment Time/Intention    Discipline  occupational therapist  -VS      Document Type  therapy note (daily note)  -VS      Subjective Information  no complaints  -VS      Mode of Treatment  occupational therapy  -VS      Care Plan Review  care plan/treatment goals reviewed;risks/benefits reviewed;patient/other agree to care plan  -VS      Patient Effort  excellent  -VS      Comment  Pt's wife was present for LE Lymph wrrapping instruction  -VS      Existing Precautions/Restrictions  fall  -VS      Patient Response to Treatment  'My legs look so small, smaller than they have been in a while\" pt. and pt's wife stated   -VS      Recorded by [VS] Florida Boyer OTR 02/13/20 1424      Row Name 02/13/20 1400             Cognitive Assessment/Intervention- PT/OT    Orientation Status (Cognition)  oriented x 4  -VS      Follows Commands (Cognition)  follows multi-step commands  -VS      Recorded by [VS] Florida Boyer OTR 02/13/20 1424      Row Name 02/13/20 1400             Bed Mobility Assessment/Treatment    Bed Mobility Assessment/Treatment  bed mobility (all) activities  -VS      Hamlin Level (Bed Mobility)  conditional independence  -VS      Recorded by [VS] Florida Boyer OTR 02/13/20 1424      Row Name 02/13/20 1400             ADL Assessment/Intervention    BADL Assessment/Intervention  bathing;lower body dressing  -VS      Recorded by [VS] Florida Boyer OTR 02/13/20 1424      Row Name 02/13/20 1400             Bathing Assessment/Intervention    Comment (Bathing)  Therapist bathed, dried and applied lotion to (B)LE toes to knees   -VS      Recorded by [VS] Florida Boyer OTR 02/13/20 1424      Row Name 02/13/20 1400             Lower Body " Dressing Assessment/Training    Lower Body Dressing Position  edge of bed sitting  -VS      Comment (Lower Body Dressing)  Therapist doon/doffed the pt. non-slip socks (B)ly   -VS      Recorded by [VS] Florida Boyer OTR 02/13/20 1424      Row Name 02/13/20 1400             Positioning and Restraints    Pre-Treatment Position  in bed  -VS      Post Treatment Position  bed  -VS      In Bed  sitting EOB;call light within reach;encouraged to call for assist;with family/caregiver  -VS      Recorded by [VS] Florida Boyer OTR 02/13/20 1424      Row Name 02/13/20 1400             Pain Scale: Numbers Pre/Post-Treatment    Pain Scale: Numbers, Pretreatment  0/10 - no pain  -VS      Pain Scale: Numbers, Post-Treatment  0/10 - no pain  -VS      Recorded by [VS] Florida Boyer, OTR 02/13/20 1424      Row Name 02/13/20 1400             Edema Assessment & Management    Location (Edema)  lower extremity, left;lower extremity, right  -VS      Recorded by [VS] Florida Boyer OTR 02/13/20 1424      Row Name 02/13/20 1400             Lower Extremity Edema Measures, Left    Lower Extremity, Left (Edema)  mid-calf  -VS      Recorded by [VS] Florida Boyer OTR 02/13/20 1424      Row Name 02/13/20 1400             Lower Extremity Edema Measures, Right    Lower Extremity, Right (Edema)  mid-calf  -VS      Recorded by [VS] Florida Boyer, OTR 02/13/20 1424      Row Name 02/13/20 1400             Edema Symptoms/Interventions    Description (Edema)  localized  -VS      Pitting Scale (Edema)  0-->no pitting present Pt's (L)LE is larger but pt. states this is due to old DVT   -VS      Treatment Interventions (Edema)  compression bandaging, short stretch;compression wrapping/taping #9stockinette,#15artiflex,#8 & 2x10 [place] Bandges toe/knee  -VS      Recorded by [VS] Florida Boyer OTR 02/13/20 1424      Row Name                Wound 01/14/20 1965 Right lower;posterior shoulder Pressure Injury    Wound - Properties Group  Date first assessed: 01/14/20 [TS] Time first assessed: 2255 [TS] Present on Hospital Admission: Y [TS] Side: Right [TS] Orientation: lower;posterior [TS] Location: shoulder [TS] Primary Wound Type: Pressure inj [TS] Stage, Pressure Injury: Stage 1 [TS] Recorded by:  [TS] Guera Wynne RN 01/15/20 0622    Row Name                Wound 01/14/20 2255 midline thoracic spine Pressure Injury    Wound - Properties Group Date first assessed: 01/14/20 [TS] Time first assessed: 2255 [TS] Present on Hospital Admission: Y [TS] Orientation: midline [TS] Location: thoracic spine [TS] Primary Wound Type: Pressure inj [TS] Stage, Pressure Injury: Stage 1 [TS] Recorded by:  [TS] Guera Wynne RN 01/15/20 0620      User Key  (r) = Recorded By, (t) = Taken By, (c) = Cosigned By    Initials Name Effective Dates Discipline    VS Florida Boyer OTR 06/08/18 -  OT    TS Guera Wynne RN 07/17/19 -  Nurse        Wound 01/14/20 2255 midline thoracic spine Pressure Injury (Active)   Dressing Appearance no drainage;open to air;dry;intact 2/12/2020  3:24 PM   Closure Open to air 2/12/2020  3:24 PM   Base blanchable 2/12/2020  3:24 PM   Periwound intact;dry 2/12/2020  3:24 PM   Periwound Temperature cool 2/12/2020  3:24 PM   Periwound Skin Turgor soft 2/12/2020  3:24 PM   Drainage Amount none 2/12/2020  3:24 PM       Wound 01/14/20 2255 Right lower;posterior shoulder Pressure Injury (Active)   Dressing Appearance intact;no drainage;open to air 2/12/2020  3:22 PM   Base blanchable 2/12/2020  3:22 PM   Periwound intact;redness;dry 2/12/2020  3:22 PM   Periwound Temperature cool 2/12/2020  3:22 PM   Periwound Skin Turgor soft 2/12/2020  3:22 PM   Drainage Amount none 2/12/2020  3:22 PM           OT Recommendation and Plan  Outcome Summary/Treatment Plan (OT)  Anticipated Discharge Disposition (OT): home, home with OP services  Planned Therapy Interventions (OT Eval): edema control/reduction  Therapy Frequency (OT Eval):  5 times/wk  Plan of Care Review  Plan of Care Reviewed With: patient, spouse  Plan of Care Reviewed With: patient, spouse  Outcome Measures     Row Name 02/13/20 1400 02/11/20 1300          How much help from another is currently needed...    Putting on and taking off regular lower body clothing?  4  -VS  4  -VS     Bathing (including washing, rinsing, and drying)  4  -VS  3  -VS     Toileting (which includes using toilet bed pan or urinal)  3  -VS  3  -VS     Putting on and taking off regular upper body clothing  4  -VS  4  -VS     Taking care of personal grooming (such as brushing teeth)  4  -VS  4  -VS     Eating meals  4  -VS  4  -VS     AM-PAC 6 Clicks Score (OT)  23  -VS  22  -VS        Functional Assessment    Outcome Measure Options  AM-PAC 6 Clicks Daily Activity (OT)  -VS  AM-PAC 6 Clicks Daily Activity (OT)  -VS       User Key  (r) = Recorded By, (t) = Taken By, (c) = Cosigned By    Initials Name Provider Type    VS Florida Boyer OTR Occupational Therapist           Time Calculation:   Time Calculation- OT     Row Name 02/13/20 1431             Time Calculation- OT    OT Start Time  1315  -VS      OT Stop Time  1350  -VS      OT Time Calculation (min)  35 min  -VS      Total Timed Code Minutes- OT  35 minute(s)  -VS      OT Received On  02/13/20  -VS        User Key  (r) = Recorded By, (t) = Taken By, (c) = Cosigned By    Initials Name Provider Type    VS Florida Boyer OTR Occupational Therapist        Therapy Charges for Today     Code Description Service Date Service Provider Modifiers Qty    27441044140 HC OT MANUAL THERAPY EA 15 MIN 2/12/2020 Florida Boyer OTSARA GO 2    93968989808 HC OT MANUAL THERAPY EA 15 MIN 2/13/2020 Florida Boyer OTSARA GO 2               Florida Boyer OTSARA  2/13/2020

## 2020-02-13 NOTE — PROGRESS NOTES
PROGRESS NOTE  Patient Name: Jose Hurtado  Age/Sex: 70 y.o. male  : 1950  MRN: [id number]    Date of Admission: 2/10/2020  Date of Encounter Visit: 20   LOS: 2 days   Patient Care Team:  Brandin Bolton MD as PCP - General (Internal Medicine)  Code, George THORPE II, MD as Consulting Physician (Hematology and Oncology)  Jose Inman MD as Consulting Physician (Urology)  Mulugeta Millan MD as Consulting Physician (Gastroenterology)  Seth Randhawa MD as Consulting Physician (Ophthalmology)    Chief Complaint: Doing better, ambulating on room air [facility]    Hospital course: Had suspected aspiration pneumonia, his esophagram was negative for direct laryngeal aspiration but he did have some reflux, and he had some positive basal water in the stomach.  He is on room air, still complaining of the cough and the dyspnea    Interval History: Tolerating p.o. diet, no cough, ambulating with minimal distress, positive dyspnea on moderate activity, afebrile, reporting subjective improvement.  On extensive antibiotic coverage at this point with 3 agent    REVIEW OF SYSTEMS:   CONSTITUTIONAL: no fever or chills  CARDIOVASCULAR: No chest pain, chest pressure or chest discomfort. No palpitations or edema.   RESPIRATORY: Improving shortness of breath, less cough and chest tightness fine in between    GASTROINTESTINAL: No anorexia, nausea, vomiting or diarrhea. No abdominal pain or blood.   HEMATOLOGIC: No bleeding or bruising.     Ventilator/Non-Invasive Ventilation Settings (From admission, onward)    None            Vital Signs  Temp:  [98.1 °F (36.7 °C)-98.4 °F (36.9 °C)] 98.2 °F (36.8 °C)  Heart Rate:  [73-87] 87  Resp:  [18-20] 20  BP: (130-149)/(66-75) 135/75  SpO2:  [94 %-100 %] 100 %  on    Device (Oxygen Therapy): room air    Intake/Output Summary (Last 24 hours) at 2020 1920  Last data filed at 2020 1300  Gross per 24 hour   Intake 480 ml   Output 2850 ml   Net -2370 ml     Flowsheet Rows       "First Filed Value   Admission Height  195.6 cm (77\") Documented at 02/10/2020 1304   Admission Weight  93 kg (205 lb) Documented at 02/10/2020 1304        Body mass index is 24.24 kg/m².      02/10/20  2029 02/11/20  0427 02/12/20  0611   Weight: 93.1 kg (205 lb 4.8 oz) 93 kg (205 lb 1.6 oz) 92.7 kg (204 lb 6.4 oz)       Physical Exam:  GEN:  No acute distress, alert, cooperative, well developed   EYES:   Sclera clear. No icterus. PERRL. Normal EOM  ENT:   External ears/nose normal, no oral lesions, no thrush, mucous membranes moist  NECK:  Supple, midline trachea, no JVD  LUNGS: Normal chest on inspection, resolved wheezing, breathing was normal, the breathing was nonlabored, no more audible rales.  He had more diminished breath sounds on the right as compared to the left  CV:  Regular rhythm and rate. Normal S1/S2. No murmurs, gallops, or rubs noted.  ABD:  Soft, nontender and nondistended. Normal bowel sounds. No guarding  EXT:  Moves all extremities well. No cyanosis. No redness. positive for lower extremities edema.   Skin: dry, intact, no bleeding    Results Review:      Results from last 7 days   Lab Units 02/12/20  0544 02/11/20  0439 02/10/20  1251 02/07/20  1043   SODIUM mmol/L 138 139 141 140   POTASSIUM mmol/L 4.1 4.1 4.8 4.7   CHLORIDE mmol/L 103 104 104 103   CO2 mmol/L 24.6 25.5 26.0 27.2   BUN mg/dL 14 20 22 26*   CREATININE mg/dL 1.00 1.05 1.23 1.48*   CALCIUM mg/dL 8.4* 8.3* 9.0 9.0   AST (SGOT) U/L  --   --  36 52*   ALT (SGPT) U/L  --   --  23 25   ANION GAP mmol/L 10.4 9.5 11.0 9.8   ALBUMIN g/dL  --   --  3.70 3.50     Results from last 7 days   Lab Units 02/11/20  1149 02/11/20  0439   TROPONIN T ng/mL 0.039* 0.046*             Results from last 7 days   Lab Units 02/12/20  0544 02/11/20  0439 02/10/20  1251 02/07/20  1043   WBC 10*3/mm3 5.43 7.21 4.71  --    HEMOGLOBIN g/dL 7.8* 7.8* 10.1*  --    HEMATOCRIT % 23.6* 23.6* 31.2* 29.1*   PLATELETS 10*3/mm3 161 155 198  --    MCV fL 87.4 88.4 " 89.9  --    NEUTROPHIL % % 73.4 76.8* 80.9*  --    LYMPHOCYTE % % 13.8* 15.7* 12.3*  --    MONOCYTES % % 7.0 4.6* 4.9*  --    EOSINOPHIL % % 5.0 2.5 1.5  --    BASOPHIL % % 0.6 0.1 0.2  --    IMM GRAN % % 0.2 0.3 0.2  --      Results from last 7 days   Lab Units 02/12/20  0543 02/11/20  0439 02/10/20  2048   INR  2.93* 4.22* 3.76*     Results from last 7 days   Lab Units 02/11/20  0439   MAGNESIUM mg/dL 2.0           Invalid input(s): LDLCALC          No results found for: POCGLU  Results from last 7 days   Lab Units 02/11/20  0028 02/10/20  1251   PROCALCITONIN ng/mL 1.37*  --    LACTATE mmol/L  --  1.0     Results from last 7 days   Lab Units 02/10/20  1531 02/10/20  1251   BLOODCX  No growth at 2 days No growth at 2 days     Results from last 7 days   Lab Units 02/10/20  1352   NITRITE UA  Negative   WBC UA /HPF 6-12*   BACTERIA UA /HPF None Seen   SQUAM EPITHEL UA /HPF 0-2     Results from last 7 days   Lab Units 02/10/20  1300   ADENOVIRUS DETECTION BY PCR  Not Detected   CORONAVIRUS 229E  Not Detected   CORONAVIRUS HKU1  Not Detected   CORONAVIRUS NL63  Not Detected   CORONAVIRUS OC43  Not Detected   HUMAN METAPNEUMOVIRUS  Not Detected   HUMAN RHINOVIRUS/ENTEROVIRUS  Not Detected   INFLUENZA B PCR  Not Detected   PARAINFLUENZA 1  Not Detected   PARAINFLUENZA VIRUS 2  Not Detected   PARAINFLUENZA VIRUS 3  Not Detected   PARAINFLUENZA VIRUS 4  Not Detected   BORDETELLA PERTUSSIS PCR  Not Detected   CHLAMYDOPHILA PNEUMONIAE PCR  Not Detected   MYCOPLAMA PNEUMO PCR  Not Detected   INFLUENZA A PCR  Not Detected   INFLUENZA A H3  Not Detected   INFLUENZA A H1  Not Detected   RSV, PCR  Not Detected           Imaging:   Imaging Results (All)           I reviewed the patient's new clinical results.  I personally viewed and interpreted the patient's imaging results: No recent film but the one from 2/10/2020 showed pleural effusion and right lower lobe infiltrate        Medication Review:     azithromycin 500 mg  Intravenous Q24H   cefTRIAXone 2 g Intravenous Q24H   ferrous sulfate 325 mg Oral QAM AC   furosemide 20 mg Oral Daily   guaiFENesin 1,200 mg Oral Q12H   ipratropium-albuterol 3 mL Nebulization 4x Daily - RT   metroNIDAZOLE 500 mg Intravenous Q8H   pantoprazole 40 mg Oral BID AC   PARoxetine 40 mg Oral QAM   potassium chloride 10 mEq Oral Daily   pramipexole 0.5 mg Oral BID   tamsulosin 0.4 mg Oral Nightly   traZODone 100 mg Oral Nightly   warfarin 1.5 mg Oral Daily         Pharmacy to dose warfarin        ASSESSMENT:   1. Pneumonia, suspecting reflux aspiration t vomiting  2. COPD  3. Chronic right-sided effusion likely loculated  4. Restrictive lung disease, part of it postsurgical  5. Obstructive sleep apnea  6. Vomiting  7. Anemia  8. Abnormal esophagram was positive reflux and was questionable bezoar in the stomach    PLAN:  Doing better, tolerating p.o. diet, exercising, ambulating on room air  No leukocytosis, no fever  We will start de-escalating the antibiotic and switching to p.o. Augmentin  We will consider thoracentesis down the road if needed but for the time being he is compensating pretty well and we will follow-up with another x-ray down the road  Consider further measures in case of worsening clinical picture or fever or other sign of sepsis    Discussed with patient    Disposition: Per primary team    Alfie Hoffman MD  02/12/20  7:20 PM          Dictated utilizing Dragon dictation

## 2020-02-13 NOTE — PROGRESS NOTES
"  PROGRESS NOTE  Patient Name: Jose Hurtado  Age/Sex: 70 y.o. male  : 1950  MRN: [id number]    Date of Admission: 2/10/2020  Date of Encounter Visit: 20   LOS: 3 days   Patient Care Team:  Brandin Bolton MD as PCP - General (Internal Medicine)  Code, George THORPE II, MD as Consulting Physician (Hematology and Oncology)  Jose Inman MD as Consulting Physician (Urology)  Mulugeta Millan MD as Consulting Physician (Gastroenterology)  Seth Randhawa MD as Consulting Physician (Ophthalmology)    Chief Complaint: Doing better, ambulating on room air [facility]    Hospital course: Had suspected aspiration pneumonia, his esophagram was negative for direct laryngeal aspiration but he did have some reflux, and he had some positive bezoar in the stomach.  He is on room air, still complaining of the cough and the dyspnea    Interval History: Tolerating p.o. diet, no cough, ambulating with minimal distress, positive dyspnea on moderate activity, afebrile, reporting subjective improvement.  Transitioned to PO antibiotics     REVIEW OF SYSTEMS:   CONSTITUTIONAL: no fever or chills  CARDIOVASCULAR: No chest pain, chest pressure or chest discomfort. No palpitations or edema.   RESPIRATORY: Improving shortness of breath, not much cough     GASTROINTESTINAL: No anorexia, nausea, vomiting or diarrhea. No abdominal pain or blood.   HEMATOLOGIC: No bleeding or bruising.     Ventilator/Non-Invasive Ventilation Settings (From admission, onward)    None            Vital Signs  Temp:  [97.4 °F (36.3 °C)-98.8 °F (37.1 °C)] 97.4 °F (36.3 °C)  Heart Rate:  [72-92] 72  Resp:  [18-20] 18  BP: (132-145)/(66-78) 145/77  SpO2:  [93 %-100 %] 97 %  on    Device (Oxygen Therapy): room air    Intake/Output Summary (Last 24 hours) at 2020 1519  Last data filed at 2020 1300  Gross per 24 hour   Intake 250 ml   Output 2600 ml   Net -2350 ml     Flowsheet Rows      First Filed Value   Admission Height  195.6 cm (77\") Documented " at 02/10/2020 1304   Admission Weight  93 kg (205 lb) Documented at 02/10/2020 1304        Body mass index is 23.37 kg/m².      02/11/20  0427 02/12/20  0611 02/13/20  0545   Weight: 93 kg (205 lb 1.6 oz) 92.7 kg (204 lb 6.4 oz) 89.4 kg (197 lb 1.6 oz)       Physical Exam:  GEN:  No acute distress, alert, cooperative, well developed   EYES:   Sclera clear. No icterus. PERRL. Normal EOM  ENT:   External ears/nose normal, no oral lesions, no thrush, mucous membranes moist  NECK:  Supple, midline trachea, no JVD  LUNGS: Normal chest on inspection,, breathing is nonlabored, minimal expiratory wheezes, positive crackles posteriorly  Nonlabored.  CV:  Regular rhythm and rate. Normal S1/S2. No murmurs, gallops, or rubs noted.  ABD:  Soft, nontender and nondistended. Normal bowel sounds. No guarding  EXT:  Moves all extremities well. No cyanosis. No redness. positive for lower extremities edema with Ace wraps.   Skin: dry, intact, no bleeding    Results Review:      Results from last 7 days   Lab Units 02/13/20  0533 02/12/20  0544 02/11/20  0439 02/10/20  1251 02/07/20  1043   SODIUM mmol/L 138 138 139 141 140   POTASSIUM mmol/L 4.1 4.1 4.1 4.8 4.7   CHLORIDE mmol/L 103 103 104 104 103   CO2 mmol/L 24.5 24.6 25.5 26.0 27.2   BUN mg/dL 14 14 20 22 26*   CREATININE mg/dL 0.93 1.00 1.05 1.23 1.48*   CALCIUM mg/dL 8.1* 8.4* 8.3* 9.0 9.0   AST (SGOT) U/L  --   --   --  36 52*   ALT (SGPT) U/L  --   --   --  23 25   ANION GAP mmol/L 10.5 10.4 9.5 11.0 9.8   ALBUMIN g/dL  --   --   --  3.70 3.50     Results from last 7 days   Lab Units 02/11/20  1149 02/11/20  0439   TROPONIN T ng/mL 0.039* 0.046*             Results from last 7 days   Lab Units 02/13/20  0533 02/12/20  0544 02/11/20  0439 02/10/20  1251 02/07/20  1043   WBC 10*3/mm3 4.94 5.43 7.21 4.71  --    HEMOGLOBIN g/dL 8.1* 7.8* 7.8* 10.1*  --    HEMATOCRIT % 24.9* 23.6* 23.6* 31.2* 29.1*   PLATELETS 10*3/mm3 159 161 155 198  --    MCV fL 87.7 87.4 88.4 89.9  --       NEUTROPHIL % % 73.9 73.4 76.8* 80.9*  --    LYMPHOCYTE % % 11.7* 13.8* 15.7* 12.3*  --    MONOCYTES % % 8.1 7.0 4.6* 4.9*  --    EOSINOPHIL % % 5.7 5.0 2.5 1.5  --    BASOPHIL % % 0.2 0.6 0.1 0.2  --    IMM GRAN % % 0.4 0.2 0.3 0.2  --      Results from last 7 days   Lab Units 02/13/20  0533 02/12/20  0543 02/11/20  0439   INR  2.05* 2.93* 4.22*     Results from last 7 days   Lab Units 02/11/20  0439   MAGNESIUM mg/dL 2.0           Invalid input(s): LDLCALC          No results found for: POCGLU  Results from last 7 days   Lab Units 02/11/20  0028 02/10/20  1251   PROCALCITONIN ng/mL 1.37*  --    LACTATE mmol/L  --  1.0     Results from last 7 days   Lab Units 02/10/20  1531 02/10/20  1251   BLOODCX  No growth at 2 days No growth at 2 days     Results from last 7 days   Lab Units 02/10/20  1352   NITRITE UA  Negative   WBC UA /HPF 6-12*   BACTERIA UA /HPF None Seen   SQUAM EPITHEL UA /HPF 0-2     Results from last 7 days   Lab Units 02/10/20  1300   ADENOVIRUS DETECTION BY PCR  Not Detected   CORONAVIRUS 229E  Not Detected   CORONAVIRUS HKU1  Not Detected   CORONAVIRUS NL63  Not Detected   CORONAVIRUS OC43  Not Detected   HUMAN METAPNEUMOVIRUS  Not Detected   HUMAN RHINOVIRUS/ENTEROVIRUS  Not Detected   INFLUENZA B PCR  Not Detected   PARAINFLUENZA 1  Not Detected   PARAINFLUENZA VIRUS 2  Not Detected   PARAINFLUENZA VIRUS 3  Not Detected   PARAINFLUENZA VIRUS 4  Not Detected   BORDETELLA PERTUSSIS PCR  Not Detected   CHLAMYDOPHILA PNEUMONIAE PCR  Not Detected   MYCOPLAMA PNEUMO PCR  Not Detected   INFLUENZA A PCR  Not Detected   INFLUENZA A H3  Not Detected   INFLUENZA A H1  Not Detected   RSV, PCR  Not Detected           Imaging:   Imaging Results (All)           I reviewed the patient's new clinical results.  I personally viewed and interpreted the patient's imaging results: No recent film but the one from 2/10/2020 showed pleural effusion and right lower lobe infiltrate        Medication Review:      amoxicillin-clavulanate 1 tablet Oral Q12H   ferrous sulfate 325 mg Oral QAM AC   furosemide 20 mg Oral Daily   guaiFENesin 1,200 mg Oral Q12H   ipratropium-albuterol 3 mL Nebulization 4x Daily - RT   pantoprazole 40 mg Oral BID AC   PARoxetine 40 mg Oral QAM   potassium chloride 10 mEq Oral Daily   pramipexole 0.5 mg Oral BID   tamsulosin 0.4 mg Oral Nightly   traZODone 100 mg Oral Nightly   warfarin 1.5 mg Oral Daily         Pharmacy to dose warfarin        ASSESSMENT:   1. Pneumonia, suspecting reflux aspiration t vomiting  2. COPD  3. Chronic right-sided effusion likely loculated  4. Restrictive lung disease, part of it postsurgical  5. Obstructive sleep apnea  6. Vomiting  7. Anemia  8. Abnormal esophagram was positive reflux and was questionable bezoar in the stomach    PLAN:  Doing better, tolerating p.o. diet, exercising, ambulating on room air  No leukocytosis, no fever, on oral antibiotic  We will consider thoracentesis down the road if needed but for the time being he is compensating pretty well and we will follow-up with another x-ray down the road, this can be done as an outpatient or sooner in case of any clinical decline while inpatient    Follow-up in 3 weeks post discharge with a chest x-ray otherwise we will see PRN during the remainder of the hospital stay    Discussed with patient    Disposition: Per primary team    Alfie Hoffman MD  02/13/20  3:19 PM          Dictated utilizing Dragon dictation

## 2020-02-13 NOTE — PLAN OF CARE
Problem: Patient Care Overview  Goal: Plan of Care Review  Flowsheets (Taken 2/13/2020 0715)  Progress: improving  Plan of Care Reviewed With: patient  Outcome Summary: denies pain, denies nausea, laron full liquids, vitals stable, gastric emptying test completed, ambulate the alvarez, will cont to monitor.

## 2020-02-13 NOTE — PLAN OF CARE
Problem: Patient Care Overview  Goal: Plan of Care Review  Flowsheets (Taken 2/13/2020 2043)  Plan of Care Reviewed With: patient; spouse  Note:   OT Lymph:  Pt. Continue to both tolerate and benefit from LE Lymph wraps.  PT's legs had no pitting edema present today, the (L) is greater than the (R) but that is due to an old DVT.  Pt. And his wife stated his legs were smaller and looked better than they have in a long time.  Pt. Has used the Lymph wraps for years and both he and his wife have applied the bandages.  OT review the Lymph wrapping process, demonstrated technique and provide the pt.with written instruction.  Pt. And his wife stated they were continue to do the wraps at home and felt they would have no issues with wrapping techniques.

## 2020-02-13 NOTE — PLAN OF CARE
Problem: Patient Care Overview  Goal: Plan of Care Review  Outcome: Ongoing (interventions implemented as appropriate)  Flowsheets (Taken 2/13/2020 1613)  Progress: improving  Plan of Care Reviewed With: patient  Outcome Summary: Pt tolerated treatment with c/o pain of left knee and weakness. Pt is independent with bed mobility and required Kenneth for STS transfers. Pt ambulated 300 feet with rwx, SBA. Pt performed several bilateral LE exercises with no complaints of difficulty. Pt states of having walker and cane at home to use if needed. Will continue to progress pt as able.

## 2020-02-13 NOTE — PROGRESS NOTES
[facility]  Inpatient Progress Note    Reason for Follow Up: Abnormal esophagus    Subjective     Interval History:   He has just returned from his gastric emptying study results pending    Current Facility-Administered Medications:   •  acetaminophen (TYLENOL) tablet 650 mg, 650 mg, Oral, Q4H PRN **OR** acetaminophen (TYLENOL) 160 MG/5ML solution 650 mg, 650 mg, Oral, Q4H PRN **OR** acetaminophen (TYLENOL) suppository 650 mg, 650 mg, Rectal, Q4H PRN, Fiona Barba MD  •  albuterol (PROVENTIL) nebulizer solution 0.083% 2.5 mg/3mL, 2.5 mg, Nebulization, Once PRN, Fiona Barba MD  •  albuterol (PROVENTIL) nebulizer solution 0.083% 2.5 mg/3mL, 2.5 mg, Nebulization, Q4H PRN, Fiona Barba MD  •  amoxicillin-clavulanate (AUGMENTIN) 875-125 MG per tablet 1 tablet, 1 tablet, Oral, Q12H, Alfie Hoffman MD, 1 tablet at 02/13/20 1209  •  bisacodyl (DULCOLAX) suppository 10 mg, 10 mg, Rectal, Daily PRN, Fiona Barba MD  •  calcium carbonate (TUMS) chewable tablet 500 mg (200 mg elemental), 2 tablet, Oral, BID PRN, Fiona Barba MD  •  docusate sodium (COLACE) capsule 100 mg, 100 mg, Oral, BID PRN, Fiona Barba MD  •  ferrous sulfate tablet 325 mg, 325 mg, Oral, QAM AC, Fiona Barba MD, 325 mg at 02/12/20 0635  •  furosemide (LASIX) tablet 20 mg, 20 mg, Oral, Daily, Fiona Barba MD, 20 mg at 02/13/20 1211  •  guaiFENesin (MUCINEX) 12 hr tablet 1,200 mg, 1,200 mg, Oral, Q12H, Fiona Barba MD, 1,200 mg at 02/13/20 1204  •  ipratropium-albuterol (DUO-NEB) nebulizer solution 3 mL, 3 mL, Nebulization, 4x Daily - RT, Juanpablo Garg MD, 3 mL at 02/13/20 1453  •  melatonin tablet 1 mg, 1 mg, Oral, Nightly PRN, Fiona Barba MD  •  muscle rub (Abraham-Ramirez) 10-15 % cream, , Topical, Q1H PRN, Fiona Barba MD  •  nitroglycerin (NITROSTAT) SL tablet 0.4 mg, 0.4 mg, Sublingual, Q5 Min PRN, Jameson  Fiona Isabel MD  •  ondansetron (ZOFRAN) tablet 4 mg, 4 mg, Oral, Q6H PRN **OR** ondansetron (ZOFRAN) injection 4 mg, 4 mg, Intravenous, Q6H PRN, Fiona Barba MD  •  pantoprazole (PROTONIX) EC tablet 40 mg, 40 mg, Oral, BID AC, Mesha Sanchez MD, 40 mg at 02/12/20 1723  •  PARoxetine (PAXIL) tablet 40 mg, 40 mg, Oral, QAM, Fiona Barba MD, 40 mg at 02/12/20 0636  •  Pharmacy to dose warfarin, , Does not apply, Continuous PRN, Fiona Barba MD  •  potassium chloride (MICRO-K) CR capsule 10 mEq, 10 mEq, Oral, Daily, Fiona Barba MD, 10 mEq at 02/13/20 1211  •  pramipexole (MIRAPEX) tablet 0.5 mg, 0.5 mg, Oral, BID, Fiona Barba MD, 0.5 mg at 02/12/20 2012  •  sodium chloride 0.9 % flush 10 mL, 10 mL, Intravenous, PRN, Fiona Barba MD, 10 mL at 02/12/20 2012  •  tamsulosin (FLOMAX) 24 hr capsule 0.4 mg, 0.4 mg, Oral, Nightly, Fiona Barba MD, 0.4 mg at 02/12/20 2012  •  traZODone (DESYREL) tablet 100 mg, 100 mg, Oral, Nightly, Fiona Barba MD, 100 mg at 02/12/20 2012  •  warfarin (COUMADIN) tablet 1.5 mg, 1.5 mg, Oral, Daily, Juanpablo Garg MD, 1.5 mg at 02/12/20 1724  Review of Systems:    His weakness all other systems reviewed and negative    Objective     Vital Signs  Temp:  [97.4 °F (36.3 °C)-98.8 °F (37.1 °C)] 97.4 °F (36.3 °C)  Heart Rate:  [72-92] 72  Resp:  [18-20] 18  BP: (132-145)/(66-78) 145/77  Body mass index is 23.37 kg/m².    Intake/Output Summary (Last 24 hours) at 2/13/2020 1532  Last data filed at 2/13/2020 1300  Gross per 24 hour   Intake 250 ml   Output 2600 ml   Net -2350 ml     I/O this shift:  In: 250 [P.O.:250]  Out: -      Physical Exam:   General: patient awake, alert and cooperative   Eyes: Normal lids and lashes, no scleral icterus   Neck: supple, normal ROM   Skin: warm and dry, not jaundiced   Cardiovascular: regular rhythm and rate, no murmurs auscultated   Pulm: clear to auscultation  bilaterally, regular and unlabored   Abdomen: soft, nontender, nondistended; normal bowel sounds   Extremities: no rash or edema   Psychiatric: Normal mood and behavior; memory intact     Results Review:     I reviewed the patient's new clinical results.    Results from last 7 days   Lab Units 02/13/20  0533 02/12/20  0544 02/11/20  0439   WBC 10*3/mm3 4.94 5.43 7.21   HEMOGLOBIN g/dL 8.1* 7.8* 7.8*   HEMATOCRIT % 24.9* 23.6* 23.6*   PLATELETS 10*3/mm3 159 161 155     Results from last 7 days   Lab Units 02/13/20  0533 02/12/20  0544 02/11/20  0439 02/10/20  1251 02/07/20  1043   SODIUM mmol/L 138 138 139 141 140   POTASSIUM mmol/L 4.1 4.1 4.1 4.8 4.7   CHLORIDE mmol/L 103 103 104 104 103   CO2 mmol/L 24.5 24.6 25.5 26.0 27.2   BUN mg/dL 14 14 20 22 26*   CREATININE mg/dL 0.93 1.00 1.05 1.23 1.48*   CALCIUM mg/dL 8.1* 8.4* 8.3* 9.0 9.0   BILIRUBIN mg/dL  --   --   --  0.3 0.2   ALK PHOS U/L  --   --   --  112 117   ALT (SGPT) U/L  --   --   --  23 25   AST (SGOT) U/L  --   --   --  36 52*   GLUCOSE mg/dL 89 89 117* 98 113*     Results from last 7 days   Lab Units 02/13/20  0533 02/12/20  0543 02/11/20  0439   INR  2.05* 2.93* 4.22*     Lab Results   Lab Value Date/Time    LIPASE 18 08/30/2016 2057       Radiology:  FL Esophagram Complete   Final Result      XR Chest 2 View   Final Result   Persistent right pleural effusion. Increased opacity at the   right lower lung suggesting atelectasis/infiltrate, follow-up   recommended.       This report was finalized on 2/10/2020 1:48 PM by Dr. Sumanth Boyer M.D.          NM Gastric Emptying    (Results Pending)       Assessment/Plan     Patient Active Problem List   Diagnosis   • Adenocarcinoma of esophagus (CMS/HCC)   • Adenomatous polyp of colon   • Malignant neoplasm of prostate (CMS/HCC)   • RLS (restless legs syndrome)   • Depression   • Hypertension   • Anti-phospholipid syndrome (CMS/HCC)   • Anemia   • Insomnia due to other mental disorder   • Peripheral  polyneuropathy   • B12 deficiency   • Postsurgical malabsorption   • Shortness of breath   • Long-term (current) use of anticoagulants, INR goal 2.0-3.0   • Lymphedema   • Pneumonia of right lower lobe due to infectious organism (CMS/HCC)   • Iron deficiency   • Elevated troponin       Assessment:  1. GERD-demonstrated on esophagram.  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 Retained gastric contents on esophagram-concern for delayed gastric emptying contributing to symptoms  3. History of esophageal cancer status post esophagectomy  4. A. fib/antiphospholipid antibody syndrome on Coumadin     Plan:  ·  twice daily PPI  · Discussed GERD diet and lifestyle modification  · Concern for gastric emptying delay based on his esophagram.  He may have some degree of postsurgical gastroparesis.    Oestreich emptying study results pending  · We will give Reglan for a few doses after his gastric emptying study to clear his stomach  · We can try a full liquid diet today  · Final recommendations for Dr. Jesus will be given tomorrow  I discussed the patients findings and my recommendations with patient, family and nursing staff.    Jameel Valencia MD

## 2020-02-13 NOTE — PLAN OF CARE
Problem: Patient Care Overview  Goal: Plan of Care Review  Outcome: Ongoing (interventions implemented as appropriate)  Flowsheets (Taken 2/13/2020 8737)  Progress: no change  Plan of Care Reviewed With: patient  Outcome Summary: Pt has no c/o pain, NPO at midnight for gastric emptying study, chronic lama in place. No s/s of distress noted will continue to monitor     Problem: Pain, Chronic (Adult)  Goal: Identify Related Risk Factors and Signs and Symptoms  Outcome: Ongoing (interventions implemented as appropriate)     Problem: Skin Injury Risk (Adult)  Goal: Identify Related Risk Factors and Signs and Symptoms  Outcome: Ongoing (interventions implemented as appropriate)     Problem: Fall Risk (Adult)  Goal: Identify Related Risk Factors and Signs and Symptoms  Outcome: Ongoing (interventions implemented as appropriate)

## 2020-02-14 ENCOUNTER — HOSPITAL ENCOUNTER (OUTPATIENT)
Dept: GENERAL RADIOLOGY | Facility: HOSPITAL | Age: 70
Discharge: HOME | End: 2020-02-14

## 2020-02-14 LAB
INR PPP: 1.99 (ref 0.9–1.1)
PROTHROMBIN TIME: 22.3 SECONDS (ref 11.7–14.2)

## 2020-02-14 PROCEDURE — 25010000002 ONDANSETRON PER 1 MG: Performed by: INTERNAL MEDICINE

## 2020-02-14 PROCEDURE — 94799 UNLISTED PULMONARY SVC/PX: CPT

## 2020-02-14 PROCEDURE — 99232 SBSQ HOSP IP/OBS MODERATE 35: CPT | Performed by: INTERNAL MEDICINE

## 2020-02-14 PROCEDURE — 85610 PROTHROMBIN TIME: CPT | Performed by: INTERNAL MEDICINE

## 2020-02-14 PROCEDURE — 97140 MANUAL THERAPY 1/> REGIONS: CPT

## 2020-02-14 PROCEDURE — 25010000002 METOCLOPRAMIDE PER 10 MG: Performed by: INTERNAL MEDICINE

## 2020-02-14 RX ORDER — WARFARIN SODIUM 2.5 MG/1
2.5 TABLET ORAL
Status: DISCONTINUED | OUTPATIENT
Start: 2020-02-14 | End: 2020-02-18

## 2020-02-14 RX ADMIN — SODIUM CHLORIDE, PRESERVATIVE FREE 10 ML: 5 INJECTION INTRAVENOUS at 21:07

## 2020-02-14 RX ADMIN — FERROUS SULFATE TAB 325 MG (65 MG ELEMENTAL FE) 325 MG: 325 (65 FE) TAB at 06:30

## 2020-02-14 RX ADMIN — TRAZODONE HYDROCHLORIDE 100 MG: 100 TABLET ORAL at 21:06

## 2020-02-14 RX ADMIN — TAMSULOSIN HYDROCHLORIDE 0.4 MG: 0.4 CAPSULE ORAL at 21:06

## 2020-02-14 RX ADMIN — GUAIFENESIN 1200 MG: 600 TABLET, EXTENDED RELEASE ORAL at 21:06

## 2020-02-14 RX ADMIN — PANTOPRAZOLE SODIUM 40 MG: 40 TABLET, DELAYED RELEASE ORAL at 06:30

## 2020-02-14 RX ADMIN — IPRATROPIUM BROMIDE AND ALBUTEROL SULFATE 3 ML: 2.5; .5 SOLUTION RESPIRATORY (INHALATION) at 11:14

## 2020-02-14 RX ADMIN — METOCLOPRAMIDE 5 MG: 5 INJECTION, SOLUTION INTRAMUSCULAR; INTRAVENOUS at 00:59

## 2020-02-14 RX ADMIN — IPRATROPIUM BROMIDE AND ALBUTEROL SULFATE 3 ML: 2.5; .5 SOLUTION RESPIRATORY (INHALATION) at 20:04

## 2020-02-14 RX ADMIN — AMOXICILLIN AND CLAVULANATE POTASSIUM 1 TABLET: 875; 125 TABLET, FILM COATED ORAL at 21:06

## 2020-02-14 RX ADMIN — METOCLOPRAMIDE 5 MG: 5 INJECTION, SOLUTION INTRAMUSCULAR; INTRAVENOUS at 05:58

## 2020-02-14 RX ADMIN — PRAMIPEXOLE DIHYDROCHLORIDE 0.5 MG: 0.5 TABLET ORAL at 09:15

## 2020-02-14 RX ADMIN — AMOXICILLIN AND CLAVULANATE POTASSIUM 1 TABLET: 875; 125 TABLET, FILM COATED ORAL at 09:15

## 2020-02-14 RX ADMIN — ONDANSETRON 4 MG: 2 INJECTION INTRAMUSCULAR; INTRAVENOUS at 14:13

## 2020-02-14 RX ADMIN — IPRATROPIUM BROMIDE AND ALBUTEROL SULFATE 3 ML: 2.5; .5 SOLUTION RESPIRATORY (INHALATION) at 07:07

## 2020-02-14 RX ADMIN — PANTOPRAZOLE SODIUM 40 MG: 40 TABLET, DELAYED RELEASE ORAL at 17:21

## 2020-02-14 RX ADMIN — PAROXETINE 40 MG: 20 TABLET, FILM COATED ORAL at 06:01

## 2020-02-14 RX ADMIN — METOCLOPRAMIDE 5 MG: 5 INJECTION, SOLUTION INTRAMUSCULAR; INTRAVENOUS at 13:36

## 2020-02-14 RX ADMIN — GUAIFENESIN 1200 MG: 600 TABLET, EXTENDED RELEASE ORAL at 09:15

## 2020-02-14 RX ADMIN — WARFARIN SODIUM 2.5 MG: 2.5 TABLET ORAL at 17:21

## 2020-02-14 RX ADMIN — IPRATROPIUM BROMIDE AND ALBUTEROL SULFATE 3 ML: 2.5; .5 SOLUTION RESPIRATORY (INHALATION) at 15:16

## 2020-02-14 RX ADMIN — PRAMIPEXOLE DIHYDROCHLORIDE 0.5 MG: 0.5 TABLET ORAL at 21:06

## 2020-02-14 RX ADMIN — FUROSEMIDE 20 MG: 20 TABLET ORAL at 09:15

## 2020-02-14 RX ADMIN — POTASSIUM CHLORIDE 10 MEQ: 750 CAPSULE, EXTENDED RELEASE ORAL at 09:15

## 2020-02-14 NOTE — PLAN OF CARE
Problem: Patient Care Overview  Goal: Plan of Care Review  Outcome: Ongoing (interventions implemented as appropriate)  Flowsheets (Taken 2/14/2020 1816)  Plan of Care Reviewed With: patient  Outcome Summary: no c/o pain, F/C to bedside drainage, OT rewrapped legs today they won't be here tomorrow to do wraps, no edema to BLE, FOB elevated, discharge some, will continue to monitor

## 2020-02-14 NOTE — PROGRESS NOTES
"    DAILY PROGRESS NOTE  [facility]    Patient Identification:  Name: Jose Hurtado  Age: 70 y.o.  Sex: male  :  1950  MRN: [id number]         Primary Care Physician: Brandin Bolton MD    Subjective:  Interval History: No new issues.  Counseled patient and spouse at bedside in regards to the entire hospitalization and what were looking into as well as going over all test results.  I even discussed Reglan and its side effect profile with both of them.  They seem up-to-date at this juncture.  No new issues overnight.  He is tolerating a full liquid diet with no issues of nausea vomiting or real abdominal pain.  Overall feeling better    Objective:    Scheduled Meds:  amoxicillin-clavulanate 1 tablet Oral Q12H   ferrous sulfate 325 mg Oral QAM AC   furosemide 20 mg Oral Daily   guaiFENesin 1,200 mg Oral Q12H   ipratropium-albuterol 3 mL Nebulization 4x Daily - RT   metoclopramide 5 mg Intravenous Q8H   pantoprazole 40 mg Oral BID AC   PARoxetine 40 mg Oral QAM   potassium chloride 10 mEq Oral Daily   pramipexole 0.5 mg Oral BID   tamsulosin 0.4 mg Oral Nightly   traZODone 100 mg Oral Nightly   warfarin 2.5 mg Oral Daily     Continuous Infusions:  Pharmacy to dose warfarin        Vital signs in last 24 hours:  Temp:  [97.4 °F (36.3 °C)-99.9 °F (37.7 °C)] 98.8 °F (37.1 °C)  Heart Rate:  [72-94] 75  Resp:  [18-20] 18  BP: (114-145)/(64-77) 123/66    Intake/Output:    Intake/Output Summary (Last 24 hours) at 2020 1244  Last data filed at 2020 1159  Gross per 24 hour   Intake 250 ml   Output 3800 ml   Net -3550 ml       Exam:  /66 (BP Location: Right arm, Patient Position: Lying)   Pulse 75   Temp 98.8 °F (37.1 °C) (Oral)   Resp 18   Ht 195.6 cm (77\")   Wt 95.1 kg (209 lb 11.2 oz)   SpO2 96%   BMI 24.87 kg/m²     General Appearance:    Alert, cooperative, no distress, AAOx3                         Throat:   Oral mucosa pink and moist                           Neck:   No JVD        "                  Lungs:    Clear to auscultation bilaterally, respirations unlabored                          Heart:    Regular rate and rhythm, S1 and S2 normal                  Abdomen:     Soft, non-tender, bowel sounds active                 Extremities: Legs wrapped with chronic lymphedema                             Data Review:  Labs in chart were reviewed.    Assessment:  Active Hospital Problems    Diagnosis  POA   • Elevated troponin [R79.89]  Yes   • Pneumonia of right lower lobe due to infectious organism (CMS/HCC) [J18.1]  Yes   • Iron deficiency [E61.1]  Yes   • Lymphedema [I89.0]  Yes   • Long-term (current) use of anticoagulants, INR goal 2.0-3.0 [Z79.01]  Not Applicable   • Postsurgical malabsorption [K91.2]  Yes   • Peripheral polyneuropathy [G62.9]  Yes   • Anemia [D64.9]  Yes   • Anti-phospholipid syndrome (CMS/HCC) [D68.61]  Yes   • Hypertension [I10]  Yes   • RLS (restless legs syndrome) [G25.81]  Yes      Resolved Hospital Problems   No resolved problems to display.       Plan:       Right lower lobe aspiration pneumonia with effusion              -Appreciate pulmonology input and assistance - they will reconsider thoracentesis at a later time pending suggested repeat chest x-ray in 3 weeks              -Probable aspiration component as esophagram abnormal with concerns for delayed gastric emptying   -GES positive and GI is stopping Reglan as of today.  Defer dietary advancement to GI but I think it would be smart to watch this patient overnight and ensure he does not rebound as well as I would like to see this patient tolerating more of a regular diet proper to discharge              -On room air w/ antibiotics transition to p.o. Augmentin               Chronic lymphedema continue leg wraps     Chronic anticoagulation due to antiphospholipid syndrome              -INR therapeutic at 2.05-1.99     Chronic RLS     Chronic King     Chronic anemia -hemoglobin stable and trending up     Elevated  troponin at admission without chest pain           Disposition -potential plan for discharge is noted for tomorrow    Mo Jesus MD  2/14/2020  12:44 PM

## 2020-02-14 NOTE — PLAN OF CARE
Problem: Patient Care Overview  Goal: Plan of Care Review  Outcome: Ongoing (interventions implemented as appropriate)  Flowsheets (Taken 2/14/2020 4729)  Plan of Care Reviewed With: patient  Outcome Summary: Very pleasant denies any complaints of pain or discomfort,vss tolerating full liquid diet will continue to monitor closely     Problem: Pain, Chronic (Adult)  Goal: Identify Related Risk Factors and Signs and Symptoms  Outcome: Ongoing (interventions implemented as appropriate)     Problem: Fall Risk (Adult)  Goal: Absence of Fall  Outcome: Ongoing (interventions implemented as appropriate)  Note:   No falls, bed alarms on siderails up times 2

## 2020-02-14 NOTE — PROGRESS NOTES
Pharmacy Consult: Warfarin Dosing/ Monitoring    Jose Hurtado is a 70 y.o. male, estimated creatinine clearance is 96.6 mL/min (by C-G formula based on SCr of 0.93 mg/dL). weighing 95.1 kg (209 lb 11.2 oz).    He has a past medical history of Anemia, Anti-phospholipid syndrome (CMS/HCC), Bladder disorder, Community acquired pneumonia, Deep venous thrombosis (CMS/HCC) (, ), Depression, Esophageal carcinoma (CMS/HCC) (2014), Hemorrhoids, HH (hiatus hernia), History of atrial fibrillation (), History of kidney stones, History of nephrolithiasis, History of pancreatitis, History of radiation therapy, Hypertension, Long-term (current) use of anticoagulants, INR goal 2.0-3.0, Lymphedema, Malignant neoplasm of prostate (CMS/HCC), Other hyperlipidemia (2018), Restless legs syndrome, Shortness of breath, and Squamous carcinoma.    Social History     Tobacco Use    Smoking status: Former Smoker     Packs/day: 2.00     Years: 3.00     Pack years: 6.00     Types: Cigarettes     Last attempt to quit: 1974     Years since quittin.1    Smokeless tobacco: Never Used    Tobacco comment: no caffeine    Substance Use Topics    Alcohol use: Yes     Frequency: 2-3 times a week     Comment: 2-3 x per week    Drug use: No       Results from last 7 days   Lab Units 20  0520  0533 20  0544 20  0543 20  0439 02/10/20  2048 02/10/20  12520  1043   INR  1.99* 2.05*  --  2.93* 4.22* 3.76* 3.04*  --    HEMOGLOBIN g/dL  --  8.1* 7.8*  --  7.8*  --  10.1*  --    HEMATOCRIT %  --  24.9* 23.6*  --  23.6*  --  31.2* 29.1*   PLATELETS 10*3/mm3  --  159 161  --  155  --  198  --      Results from last 7 days   Lab Units 20  0533 20  0544 20  0439 02/10/20  1251 20  1043   SODIUM mmol/L 138 138 139 141 140   POTASSIUM mmol/L 4.1 4.1 4.1 4.8 4.7   CHLORIDE mmol/L 103 103 104 104 103   CO2 mmol/L 24.5 24.6 25.5 26.0 27.2   BUN mg/dL 14 14 20 22 26*   CREATININE  mg/dL 0.93 1.00 1.05 1.23 1.48*   CALCIUM mg/dL 8.1* 8.4* 8.3* 9.0 9.0   BILIRUBIN mg/dL  --   --   --  0.3 0.2   ALK PHOS U/L  --   --   --  112 117   ALT (SGPT) U/L  --   --   --  23 25   AST (SGOT) U/L  --   --   --  36 52*   GLUCOSE mg/dL 89 89 117* 98 113*     Anticoagulation history: warfarin 2.5 mg every M/W/F/Sun; 5 mg all other days     Hospital Anticoagulation:  Consulting provider: Dr. Fiona Barba MD  Start date: Prior to admission   Indication: antiphospholipid syndrome   Target INR: 2-3  Expected duration: indefinite   Bridge Therapy: No                Date 2/10 2/11 2/12 2/13         INR 3.04 4.22 2.93 2.05         Warfarin dose HOLD HOLD 1.5mg 1.5           Potential drug interactions: paroxetine  Augmentin - inc INR    Relevant nutrition status: regular diet, cardiac low sodium    Education complete: Yes  Date: 2/11/2020    Assessment/Plan:  Abx narrowed to augmentin on 2/12, still has DDI with warfarin. Will adjust to 2.5 mg daily  Monitor s/sx of bleeding.  H/H stably low this am.    Pharmacy will continue to follow until discharge or discontinuation of warfarin.   Marcelo Tatum, [facility]  2/14/2020

## 2020-02-14 NOTE — NURSING NOTE
Spoke with Dr Hoffman about esophagram it ok to cancel it for today one was done on 02/10/2020 and was negative.

## 2020-02-14 NOTE — PLAN OF CARE
Problem: Patient Care Overview  Goal: Plan of Care Review  Flowsheets (Taken 2/14/2020 3254)  Plan of Care Reviewed With: patient  Note:   OT Lymph: Pt. Continues to tolerate the LE bandages.  Pt. Stated that his legs look better and are smaller than they have been in years.  Pt's (L) continues to be larger than (R) due to old DVT.  PT. Continues to benefit from Lymph LE wrapping, RNs to complete the wraps Sat & Linsey, directions are in the pt's room.  Pt. Will continue to use the Lymph Wraps at home after D/C both he and his wife have been instructed in wrapping techniques.

## 2020-02-14 NOTE — PROGRESS NOTES
[facility]  Inpatient Progress Note    Reason for Follow Up: Abnormal esophagram    Subjective     Interval History:   Patient asleep this morning, when I woke him up he had minimal answers to my questions, nursing staff tells me he had a very quiet night    Current Facility-Administered Medications:   •  acetaminophen (TYLENOL) tablet 650 mg, 650 mg, Oral, Q4H PRN, 650 mg at 02/13/20 2027 **OR** acetaminophen (TYLENOL) 160 MG/5ML solution 650 mg, 650 mg, Oral, Q4H PRN **OR** acetaminophen (TYLENOL) suppository 650 mg, 650 mg, Rectal, Q4H PRN, Fiona Barba MD  •  albuterol (PROVENTIL) nebulizer solution 0.083% 2.5 mg/3mL, 2.5 mg, Nebulization, Once PRN, Fiona Barba MD  •  albuterol (PROVENTIL) nebulizer solution 0.083% 2.5 mg/3mL, 2.5 mg, Nebulization, Q4H PRN, Fiona Barba MD  •  amoxicillin-clavulanate (AUGMENTIN) 875-125 MG per tablet 1 tablet, 1 tablet, Oral, Q12H, Alfie Hoffman MD, 1 tablet at 02/13/20 2020  •  bisacodyl (DULCOLAX) suppository 10 mg, 10 mg, Rectal, Daily PRN, Fiona Barba MD  •  calcium carbonate (TUMS) chewable tablet 500 mg (200 mg elemental), 2 tablet, Oral, BID PRN, Fiona Barba MD  •  docusate sodium (COLACE) capsule 100 mg, 100 mg, Oral, BID PRN, Fiona Barba MD  •  ferrous sulfate tablet 325 mg, 325 mg, Oral, QAM AC, Fiona Barba MD, 325 mg at 02/14/20 0630  •  furosemide (LASIX) tablet 20 mg, 20 mg, Oral, Daily, Fiona Barba MD, 20 mg at 02/13/20 1211  •  guaiFENesin (MUCINEX) 12 hr tablet 1,200 mg, 1,200 mg, Oral, Q12H, Fiona Barba MD, 1,200 mg at 02/13/20 2020  •  ipratropium-albuterol (DUO-NEB) nebulizer solution 3 mL, 3 mL, Nebulization, 4x Daily - RT, Juanpablo Garg MD, 3 mL at 02/14/20 0707  •  melatonin tablet 1 mg, 1 mg, Oral, Nightly PRN, Fiona Barba MD  •  metoclopramide (REGLAN) injection 5 mg, 5 mg, Intravenous, Q8H, Jameel Valencia  MD JONATHAN, 5 mg at 02/14/20 0558  •  muscle rub (Abraham-Ramirez) 10-15 % cream, , Topical, Q1H PRN, Fiona Barba MD  •  nitroglycerin (NITROSTAT) SL tablet 0.4 mg, 0.4 mg, Sublingual, Q5 Min PRN, Fiona Barba MD  •  ondansetron (ZOFRAN) tablet 4 mg, 4 mg, Oral, Q6H PRN **OR** ondansetron (ZOFRAN) injection 4 mg, 4 mg, Intravenous, Q6H PRN, Fiona Barba MD  •  pantoprazole (PROTONIX) EC tablet 40 mg, 40 mg, Oral, BID AC, Mesha Sanchez MD, 40 mg at 02/14/20 0630  •  PARoxetine (PAXIL) tablet 40 mg, 40 mg, Oral, QAM, Fiona Barba MD, 40 mg at 02/14/20 0601  •  Pharmacy to dose warfarin, , Does not apply, Continuous PRN, Fiona Barba MD  •  potassium chloride (MICRO-K) CR capsule 10 mEq, 10 mEq, Oral, Daily, Fiona Barba MD, 10 mEq at 02/13/20 1211  •  pramipexole (MIRAPEX) tablet 0.5 mg, 0.5 mg, Oral, BID, Fiona Barba MD, 0.5 mg at 02/13/20 2020  •  sodium chloride 0.9 % flush 10 mL, 10 mL, Intravenous, PRN, Fiona Barba MD, 10 mL at 02/13/20 2029  •  tamsulosin (FLOMAX) 24 hr capsule 0.4 mg, 0.4 mg, Oral, Nightly, Fiona Barba MD, 0.4 mg at 02/13/20 2020  •  traZODone (DESYREL) tablet 100 mg, 100 mg, Oral, Nightly, Fiona Barba MD, 100 mg at 02/13/20 2020  •  warfarin (COUMADIN) tablet 2.5 mg, 2.5 mg, Oral, Daily, Fiona Barba MD  Review of Systems:    He endorses tiredness all other systems reviewed and negative    Objective     Vital Signs  Temp:  [97.4 °F (36.3 °C)-99.9 °F (37.7 °C)] 98.8 °F (37.1 °C)  Heart Rate:  [72-94] 75  Resp:  [18-20] 18  BP: (114-145)/(64-77) 123/66  Body mass index is 24.87 kg/m².    Intake/Output Summary (Last 24 hours) at 2/14/2020 0821  Last data filed at 2/14/2020 0335  Gross per 24 hour   Intake 250 ml   Output 2800 ml   Net -2550 ml     No intake/output data recorded.     Physical Exam:   General: patient awake, alert and cooperative   Eyes: Normal lids and lashes, no  scleral icterus   Neck: supple, normal ROM   Skin: warm and dry, not jaundiced   Cardiovascular: regular rhythm and rate, no murmurs auscultated   Pulm: clear to auscultation bilaterally, regular and unlabored   Abdomen: soft, nontender, nondistended; normal bowel sounds   Extremities: no rash or edema   Psychiatric: Normal mood and behavior; memory intact     Results Review:     I reviewed the patient's new clinical results.    Results from last 7 days   Lab Units 02/13/20 0533 02/12/20  0544 02/11/20  0439   WBC 10*3/mm3 4.94 5.43 7.21   HEMOGLOBIN g/dL 8.1* 7.8* 7.8*   HEMATOCRIT % 24.9* 23.6* 23.6*   PLATELETS 10*3/mm3 159 161 155     Results from last 7 days   Lab Units 02/13/20  0533 02/12/20  0544 02/11/20  0439 02/10/20  1251 02/07/20  1043   SODIUM mmol/L 138 138 139 141 140   POTASSIUM mmol/L 4.1 4.1 4.1 4.8 4.7   CHLORIDE mmol/L 103 103 104 104 103   CO2 mmol/L 24.5 24.6 25.5 26.0 27.2   BUN mg/dL 14 14 20 22 26*   CREATININE mg/dL 0.93 1.00 1.05 1.23 1.48*   CALCIUM mg/dL 8.1* 8.4* 8.3* 9.0 9.0   BILIRUBIN mg/dL  --   --   --  0.3 0.2   ALK PHOS U/L  --   --   --  112 117   ALT (SGPT) U/L  --   --   --  23 25   AST (SGOT) U/L  --   --   --  36 52*   GLUCOSE mg/dL 89 89 117* 98 113*     Results from last 7 days   Lab Units 02/14/20  0529 02/13/20  0533 02/12/20  0543   INR  1.99* 2.05* 2.93*     Lab Results   Lab Value Date/Time    LIPASE 18 08/30/2016 2057       Radiology:  FL Esophagram Complete   Final Result      XR Chest 2 View   Final Result   Persistent right pleural effusion. Increased opacity at the   right lower lung suggesting atelectasis/infiltrate, follow-up   recommended.       This report was finalized on 2/10/2020 1:48 PM by Dr. Sumanth Boyer M.D.          NM Gastric Emptying    (Results Pending)       Assessment/Plan     Patient Active Problem List   Diagnosis   • Adenocarcinoma of esophagus (CMS/HCC)   • Adenomatous polyp of colon   • Malignant neoplasm of prostate (CMS/HCC)   •  RLS (restless legs syndrome)   • Depression   • Hypertension   • Anti-phospholipid syndrome (CMS/HCC)   • Anemia   • Insomnia due to other mental disorder   • Peripheral polyneuropathy   • B12 deficiency   • Postsurgical malabsorption   • Shortness of breath   • Long-term (current) use of anticoagulants, INR goal 2.0-3.0   • Lymphedema   • Pneumonia of right lower lobe due to infectious organism (CMS/HCC)   • Iron deficiency   • Elevated troponin       Assessment:  1. GERD-demonstrated on esophagram.  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 Retained gastric contents on esophagram-concern for delayed gastric emptying contributing to symptoms  3. History of esophageal cancer status post esophagectomy  4. A. fib/antiphospholipid antibody syndrome on Coumadin     Plan:  ·  twice daily PPI  · Discussed GERD diet and lifestyle modification with him previously  · Concern for gastric emptying delay based on his esophagram.  He may have some degree of postsurgical gastroparesis.      Gastric emptying study results still pending  ·  Reglan for a few doses after his gastric emptying study to clear his stomach, last dose this afternoon  · Continue current diet  · Final recommendations for Dr. Jesus will be given tomorrow after gastric emptying study is reviewed  I discussed the patients findings and my recommendations with patient and nursing staff.    Jameel Valencia MD

## 2020-02-14 NOTE — PROGRESS NOTES
"  PROGRESS NOTE  Patient Name: Jose Hurtado  Age/Sex: 70 y.o. male  : 1950  MRN: [id number]    Date of Admission: 2/10/2020  Date of Encounter Visit: 20   LOS: 4 days   Patient Care Team:  Brandin Bolton MD as PCP - General (Internal Medicine)  Code, George THORPE II, MD as Consulting Physician (Hematology and Oncology)  Jose Inman MD as Consulting Physician (Urology)  Mulugeta Millan MD as Consulting Physician (Gastroenterology)  Seth Randhawa MD as Consulting Physician (Ophthalmology)    Chief Complaint: Doing better, ambulating on room air     Hospital course: Had suspected aspiration pneumonia, his esophagram was negative for direct laryngeal aspiration but he did have some reflux, and he had some positive bezoar in the stomach.  He is on room air, still complaining of the cough and the dyspnea    Interval History: Tolerating p.o. diet, no respiratory complaint, on p.o. antibiotics    REVIEW OF SYSTEMS:   CONSTITUTIONAL: no fever or chills  CARDIOVASCULAR: No chest pain, chest pressure or chest discomfort. No palpitations or edema.   RESPIRATORY: Improving shortness of breath, not much cough     GASTROINTESTINAL: No anorexia, nausea, vomiting or diarrhea. No abdominal pain or blood.   HEMATOLOGIC: No bleeding or bruising.     Ventilator/Non-Invasive Ventilation Settings (From admission, onward)    None            Vital Signs  Temp:  [98.3 °F (36.8 °C)-99.9 °F (37.7 °C)] 98.3 °F (36.8 °C)  Heart Rate:  [73-94] 73  Resp:  [18-20] 18  BP: (114-141)/(64-70) 126/69  SpO2:  [93 %-96 %] 95 %  on    Device (Oxygen Therapy): room air    Intake/Output Summary (Last 24 hours) at 2020 1718  Last data filed at 2020 1159  Gross per 24 hour   Intake --   Output 2700 ml   Net -2700 ml     Flowsheet Rows      First Filed Value   Admission Height  195.6 cm (77\") Documented at 02/10/2020 1304   Admission Weight  93 kg (205 lb) Documented at 02/10/2020 1304        Body mass index is 24.87 " kg/m².      02/12/20  0611 02/13/20  0545 02/14/20  0552   Weight: 92.7 kg (204 lb 6.4 oz) 89.4 kg (197 lb 1.6 oz) 95.1 kg (209 lb 11.2 oz)       Physical Exam:  GEN:  No acute distress, alert, cooperative, well developed   LUNGS: Normal chest on inspection,, breathing is nonlabored, resolved expiratory wheezes, minimal crackles posteriorly,  Nonlabored.  Positive lower extremity edema    Results Review:      Results from last 7 days   Lab Units 02/13/20  0533 02/12/20  0544 02/11/20  0439 02/10/20  1251   SODIUM mmol/L 138 138 139 141   POTASSIUM mmol/L 4.1 4.1 4.1 4.8   CHLORIDE mmol/L 103 103 104 104   CO2 mmol/L 24.5 24.6 25.5 26.0   BUN mg/dL 14 14 20 22   CREATININE mg/dL 0.93 1.00 1.05 1.23   CALCIUM mg/dL 8.1* 8.4* 8.3* 9.0   AST (SGOT) U/L  --   --   --  36   ALT (SGPT) U/L  --   --   --  23   ANION GAP mmol/L 10.5 10.4 9.5 11.0   ALBUMIN g/dL  --   --   --  3.70     Results from last 7 days   Lab Units 02/11/20  1149 02/11/20  0439   TROPONIN T ng/mL 0.039* 0.046*             Results from last 7 days   Lab Units 02/13/20  0533 02/12/20  0544 02/11/20  0439 02/10/20  1251   WBC 10*3/mm3 4.94 5.43 7.21 4.71   HEMOGLOBIN g/dL 8.1* 7.8* 7.8* 10.1*   HEMATOCRIT % 24.9* 23.6* 23.6* 31.2*   PLATELETS 10*3/mm3 159 161 155 198   MCV fL 87.7 87.4 88.4 89.9   NEUTROPHIL % % 73.9 73.4 76.8* 80.9*   LYMPHOCYTE % % 11.7* 13.8* 15.7* 12.3*   MONOCYTES % % 8.1 7.0 4.6* 4.9*   EOSINOPHIL % % 5.7 5.0 2.5 1.5   BASOPHIL % % 0.2 0.6 0.1 0.2   IMM GRAN % % 0.4 0.2 0.3 0.2     Results from last 7 days   Lab Units 02/14/20  0529 02/13/20  0533 02/12/20  0543   INR  1.99* 2.05* 2.93*     Results from last 7 days   Lab Units 02/11/20  0439   MAGNESIUM mg/dL 2.0           Invalid input(s): LDLCALC          No results found for: POCGLU  Results from last 7 days   Lab Units 02/11/20  0028 02/10/20  1251   PROCALCITONIN ng/mL 1.37*  --    LACTATE mmol/L  --  1.0     Results from last 7 days   Lab Units 02/10/20  1531 02/10/20  1251    BLOODCX  No growth at 4 days No growth at 4 days     Results from last 7 days   Lab Units 02/10/20  1352   NITRITE UA  Negative   WBC UA /HPF 6-12*   BACTERIA UA /HPF None Seen   SQUAM EPITHEL UA /HPF 0-2     Results from last 7 days   Lab Units 02/10/20  1300   ADENOVIRUS DETECTION BY PCR  Not Detected   CORONAVIRUS 229E  Not Detected   CORONAVIRUS HKU1  Not Detected   CORONAVIRUS NL63  Not Detected   CORONAVIRUS OC43  Not Detected   HUMAN METAPNEUMOVIRUS  Not Detected   HUMAN RHINOVIRUS/ENTEROVIRUS  Not Detected   INFLUENZA B PCR  Not Detected   PARAINFLUENZA 1  Not Detected   PARAINFLUENZA VIRUS 2  Not Detected   PARAINFLUENZA VIRUS 3  Not Detected   PARAINFLUENZA VIRUS 4  Not Detected   BORDETELLA PERTUSSIS PCR  Not Detected   CHLAMYDOPHILA PNEUMONIAE PCR  Not Detected   MYCOPLAMA PNEUMO PCR  Not Detected   INFLUENZA A PCR  Not Detected   INFLUENZA A H3  Not Detected   INFLUENZA A H1  Not Detected   RSV, PCR  Not Detected           Imaging:   Imaging Results (All)           I reviewed the patient's new clinical results.  I personally viewed and interpreted the patient's imaging results: No recent film but the one from 2/10/2020 showed pleural effusion and right lower lobe infiltrate        Medication Review:     amoxicillin-clavulanate 1 tablet Oral Q12H   ferrous sulfate 325 mg Oral QAM AC   furosemide 20 mg Oral Daily   guaiFENesin 1,200 mg Oral Q12H   ipratropium-albuterol 3 mL Nebulization 4x Daily - RT   pantoprazole 40 mg Oral BID AC   PARoxetine 40 mg Oral QAM   potassium chloride 10 mEq Oral Daily   pramipexole 0.5 mg Oral BID   tamsulosin 0.4 mg Oral Nightly   traZODone 100 mg Oral Nightly   warfarin 2.5 mg Oral Daily         Pharmacy to dose warfarin        ASSESSMENT:   1. Pneumonia, suspecting reflux aspiration t vomiting  2. COPD  3. Chronic right-sided effusion likely loculated  4. Restrictive lung disease, part of it postsurgical  5. Obstructive sleep apnea  6. Vomiting  7. Anemia  8. Abnormal  esophagram was positive reflux and was questionable bezoar in the stomach    PLAN:  Arrangement for outpatient follow-up in 3 weeks  Discussed with the patient  We will follow-up PRN during the remainder of the hospital stay but apparently has been discharged home tomorrow    Discussed with patient    Disposition: Per primary team    Alfie Hoffman MD  02/14/20  5:18 PM          Dictated utilizing Dragon dictation

## 2020-02-14 NOTE — PROGRESS NOTES
Continued Stay Note  [facility]     Patient Name: Jose Hurtado  MRN: [id number]  Today's Date: 2/14/2020    Admit Date: 2/10/2020    Discharge Plan     Row Name 02/14/20 1553       Plan    Plan  Home with Spouse    Patient/Family in Agreement with Plan  yes    Plan Comments  Patient planning to return home with spouse at [place]. Declines any needs at this time. CCP to follow. Gogo Ray RN        Discharge Codes    No documentation.             Gogo Ray RN

## 2020-02-15 ENCOUNTER — INPATIENT HOSPITAL (OUTPATIENT)
Dept: URBAN - METROPOLITAN AREA HOSPITAL 113 | Facility: HOSPITAL | Age: 70
End: 2020-02-15
Payer: MEDICARE

## 2020-02-15 DIAGNOSIS — K31.84 GASTROPARESIS: ICD-10-CM

## 2020-02-15 DIAGNOSIS — K21.9 GASTRO-ESOPHAGEAL REFLUX DISEASE WITHOUT ESOPHAGITIS: ICD-10-CM

## 2020-02-15 LAB
BACTERIA SPEC AEROBE CULT: NORMAL
BACTERIA SPEC AEROBE CULT: NORMAL
HCT VFR BLD AUTO: 28.4 % (ref 37.5–51)
HGB BLD-MCNC: 9.3 G/DL (ref 13–17.7)
INR PPP: 2.32 (ref 0.9–1.1)
PROTHROMBIN TIME: 25.2 SECONDS (ref 11.7–14.2)

## 2020-02-15 PROCEDURE — 94799 UNLISTED PULMONARY SVC/PX: CPT

## 2020-02-15 PROCEDURE — 85018 HEMOGLOBIN: CPT | Performed by: HOSPITALIST

## 2020-02-15 PROCEDURE — 87070 CULTURE OTHR SPECIMN AEROBIC: CPT | Performed by: HOSPITALIST

## 2020-02-15 PROCEDURE — 99232 SBSQ HOSP IP/OBS MODERATE 35: CPT | Performed by: INTERNAL MEDICINE

## 2020-02-15 PROCEDURE — 85014 HEMATOCRIT: CPT | Performed by: HOSPITALIST

## 2020-02-15 PROCEDURE — 85610 PROTHROMBIN TIME: CPT | Performed by: INTERNAL MEDICINE

## 2020-02-15 PROCEDURE — 87205 SMEAR GRAM STAIN: CPT | Performed by: HOSPITALIST

## 2020-02-15 RX ORDER — AMOXICILLIN AND CLAVULANATE POTASSIUM 875; 125 MG/1; MG/1
1 TABLET, FILM COATED ORAL EVERY 12 HOURS SCHEDULED
Qty: 8 TABLET | Refills: 0 | Status: SHIPPED | OUTPATIENT
Start: 2020-02-15 | End: 2020-02-19 | Stop reason: HOSPADM

## 2020-02-15 RX ORDER — METOCLOPRAMIDE HYDROCHLORIDE 5 MG/5ML
5 SOLUTION ORAL
Status: DISCONTINUED | OUTPATIENT
Start: 2020-02-15 | End: 2020-02-19 | Stop reason: HOSPADM

## 2020-02-15 RX ORDER — PANTOPRAZOLE SODIUM 40 MG/1
40 TABLET, DELAYED RELEASE ORAL
Qty: 60 TABLET | Refills: 0 | Status: SHIPPED | OUTPATIENT
Start: 2020-02-15 | End: 2020-05-15 | Stop reason: SDUPTHER

## 2020-02-15 RX ORDER — METOCLOPRAMIDE HYDROCHLORIDE 5 MG/5ML
5 SOLUTION ORAL
Qty: 600 ML | Refills: 0 | Status: SHIPPED | OUTPATIENT
Start: 2020-02-15 | End: 2020-03-06

## 2020-02-15 RX ORDER — GUAIFENESIN 600 MG/1
1200 TABLET, EXTENDED RELEASE ORAL EVERY 12 HOURS
Qty: 40 TABLET | Refills: 0 | Status: SHIPPED | OUTPATIENT
Start: 2020-02-15 | End: 2020-02-25

## 2020-02-15 RX ADMIN — PAROXETINE 40 MG: 20 TABLET, FILM COATED ORAL at 06:54

## 2020-02-15 RX ADMIN — TAMSULOSIN HYDROCHLORIDE 0.4 MG: 0.4 CAPSULE ORAL at 21:08

## 2020-02-15 RX ADMIN — FERROUS SULFATE TAB 325 MG (65 MG ELEMENTAL FE) 325 MG: 325 (65 FE) TAB at 06:54

## 2020-02-15 RX ADMIN — IPRATROPIUM BROMIDE AND ALBUTEROL SULFATE 3 ML: 2.5; .5 SOLUTION RESPIRATORY (INHALATION) at 12:28

## 2020-02-15 RX ADMIN — SODIUM CHLORIDE, PRESERVATIVE FREE 10 ML: 5 INJECTION INTRAVENOUS at 21:08

## 2020-02-15 RX ADMIN — IPRATROPIUM BROMIDE AND ALBUTEROL SULFATE 3 ML: 2.5; .5 SOLUTION RESPIRATORY (INHALATION) at 20:49

## 2020-02-15 RX ADMIN — PRAMIPEXOLE DIHYDROCHLORIDE 0.5 MG: 0.5 TABLET ORAL at 08:18

## 2020-02-15 RX ADMIN — METOCLOPRAMIDE HYDROCHLORIDE 5 MG: 5 SOLUTION ORAL at 18:36

## 2020-02-15 RX ADMIN — TRAZODONE HYDROCHLORIDE 100 MG: 100 TABLET ORAL at 23:17

## 2020-02-15 RX ADMIN — SODIUM CHLORIDE, PRESERVATIVE FREE 10 ML: 5 INJECTION INTRAVENOUS at 08:18

## 2020-02-15 RX ADMIN — GUAIFENESIN 1200 MG: 600 TABLET, EXTENDED RELEASE ORAL at 08:17

## 2020-02-15 RX ADMIN — METOCLOPRAMIDE HYDROCHLORIDE 5 MG: 5 SOLUTION ORAL at 21:07

## 2020-02-15 RX ADMIN — POTASSIUM CHLORIDE 10 MEQ: 750 CAPSULE, EXTENDED RELEASE ORAL at 08:18

## 2020-02-15 RX ADMIN — FUROSEMIDE 20 MG: 20 TABLET ORAL at 08:17

## 2020-02-15 RX ADMIN — AMOXICILLIN AND CLAVULANATE POTASSIUM 1 TABLET: 875; 125 TABLET, FILM COATED ORAL at 08:17

## 2020-02-15 RX ADMIN — PRAMIPEXOLE DIHYDROCHLORIDE 0.5 MG: 0.5 TABLET ORAL at 18:40

## 2020-02-15 RX ADMIN — Medication 1 MG: at 02:16

## 2020-02-15 RX ADMIN — GUAIFENESIN 1200 MG: 600 TABLET, EXTENDED RELEASE ORAL at 21:08

## 2020-02-15 RX ADMIN — PANTOPRAZOLE SODIUM 40 MG: 40 TABLET, DELAYED RELEASE ORAL at 06:54

## 2020-02-15 RX ADMIN — METOCLOPRAMIDE HYDROCHLORIDE 5 MG: 5 SOLUTION ORAL at 15:00

## 2020-02-15 RX ADMIN — WARFARIN SODIUM 2.5 MG: 2.5 TABLET ORAL at 18:36

## 2020-02-15 RX ADMIN — AMOXICILLIN AND CLAVULANATE POTASSIUM 1 TABLET: 875; 125 TABLET, FILM COATED ORAL at 21:08

## 2020-02-15 RX ADMIN — PANTOPRAZOLE SODIUM 40 MG: 40 TABLET, DELAYED RELEASE ORAL at 18:36

## 2020-02-15 RX ADMIN — IPRATROPIUM BROMIDE AND ALBUTEROL SULFATE 3 ML: 2.5; .5 SOLUTION RESPIRATORY (INHALATION) at 08:03

## 2020-02-15 NOTE — PROGRESS NOTES
Pharmacy Consult: Warfarin Dosing/ Monitoring    Jose Hurtado is a 70 y.o. male, estimated creatinine clearance is 85.6 mL/min (by C-G formula based on SCr of 0.93 mg/dL). weighing 84.3 kg (185 lb 12.8 oz).    He has a past medical history of Anemia, Anti-phospholipid syndrome (CMS/HCC), Bladder disorder, Community acquired pneumonia, Deep venous thrombosis (CMS/HCC) (, ), Depression, Esophageal carcinoma (CMS/HCC) (2014), Hemorrhoids, HH (hiatus hernia), History of atrial fibrillation (), History of kidney stones, History of nephrolithiasis, History of pancreatitis, History of radiation therapy, Hypertension, Long-term (current) use of anticoagulants, INR goal 2.0-3.0, Lymphedema, Malignant neoplasm of prostate (CMS/HCC), Other hyperlipidemia (2018), Restless legs syndrome, Shortness of breath, and Squamous carcinoma.    Social History     Tobacco Use    Smoking status: Former Smoker     Packs/day: 2.00     Years: 3.00     Pack years: 6.00     Types: Cigarettes     Last attempt to quit: 1974     Years since quittin.1    Smokeless tobacco: Never Used    Tobacco comment: no caffeine    Substance Use Topics    Alcohol use: Yes     Frequency: 2-3 times a week     Comment: 2-3 x per week    Drug use: No       Results from last 7 days   Lab Units 02/15/20  0706 20  0529 20  0533 20  0544 20  0543 20  0439 02/10/20  2048 02/10/20  1251   INR  2.32* 1.99* 2.05*  --  2.93* 4.22* 3.76* 3.04*   HEMOGLOBIN g/dL 9.3*  --  8.1* 7.8*  --  7.8*  --  10.1*   HEMATOCRIT % 28.4*  --  24.9* 23.6*  --  23.6*  --  31.2*   PLATELETS 10*3/mm3  --   --  159 161  --  155  --  198     Results from last 7 days   Lab Units 20  0533 20  0544 20  0439 02/10/20  1251   SODIUM mmol/L 138 138 139 141   POTASSIUM mmol/L 4.1 4.1 4.1 4.8   CHLORIDE mmol/L 103 103 104 104   CO2 mmol/L 24.5 24.6 25.5 26.0   BUN mg/dL 14 14 20 22   CREATININE mg/dL 0.93 1.00 1.05 1.23   CALCIUM  mg/dL 8.1* 8.4* 8.3* 9.0   BILIRUBIN mg/dL  --   --   --  0.3   ALK PHOS U/L  --   --   --  112   ALT (SGPT) U/L  --   --   --  23   AST (SGOT) U/L  --   --   --  36   GLUCOSE mg/dL 89 89 117* 98     Anticoagulation history: warfarin 2.5 mg every M/W/F/Sun; 5 mg all other days     Hospital Anticoagulation:  Consulting provider: Dr. Fiona Barba MD  Start date: Prior to admission   Indication: antiphospholipid syndrome   Target INR: 2-3  Expected duration: indefinite   Bridge Therapy: No                Date 2/10 2/11 2/12 2/13 2/14        INR 3.04 4.22 2.93 2.05 2.32        Warfarin dose HOLD HOLD 1.5mg 1.5 1.5 mg          Potential drug interactions: paroxetine  Augmentin - inc INR    Relevant nutrition status: regular diet, cardiac low sodium    Education complete: Yes  Date: 2/11/2020    Assessment/Plan:  INR at goal, continue current dose.  Monitor s/sx of bleeding.  H/H stably low this am.    Pharmacy will continue to follow until discharge or discontinuation of warfarin.   Amilcar Garcia III, [facility]  2/15/2020

## 2020-02-15 NOTE — PLAN OF CARE
Problem: Fall Risk (Adult)  Goal: Identify Related Risk Factors and Signs and Symptoms  Outcome: Ongoing (interventions implemented as appropriate)     Problem: Skin Injury Risk (Adult)  Goal: Identify Related Risk Factors and Signs and Symptoms  Outcome: Ongoing (interventions implemented as appropriate)     Problem: Pain, Chronic (Adult)  Goal: Identify Related Risk Factors and Signs and Symptoms  Outcome: Ongoing (interventions implemented as appropriate)   Pt sleeping comfortably, vss, received melatonin r/t c/o not able to fall asleep, med effective, no s/sx of pain or distress. Vss, nsr on monitor,  accumax pump in place for preventive skin care. Ble elevated with compression wrap in place. Sputum sent for c/s per order, white, thick. No sob. Cont on po a/b with no adverse reactions.

## 2020-02-15 NOTE — PLAN OF CARE
Patient discharge cancelled today as patient was found to be incapable of standing under his own power.   PT needs to work with patient to assess his needs.

## 2020-02-15 NOTE — PROGRESS NOTES
"   LOS: 5 days   Patient Care Team:  Brandin Bolton MD as PCP - General (Internal Medicine)  [place], George THORPE II, MD as Consulting Physician (Hematology and Oncology)  Jose Inman MD as Consulting Physician (Urology)  Mulugeta Millan MD as Consulting Physician (Gastroenterology)  Seth Randhawa MD as Consulting Physician (Ophthalmology)    Chief Complaint: GERD    Subjective     History of Present Illness    Subjective:  Symptoms:  Improved.    Diet:  Adequate intake.    Activity level: Normal.    Pain:  He reports no pain.      TOLERATING diet well no vomiting or regurgitation   History taken from: patient chart    Objective     Vital Signs  Temp:  [98 °F (36.7 °C)-99.9 °F (37.7 °C)] 98 °F (36.7 °C)  Heart Rate:  [73-91] 83  Resp:  [16-20] 18  BP: (116-126)/(60-69) 116/62    Objective:  General Appearance:  Comfortable.    Vital signs: (most recent): Blood pressure 116/62, pulse 83, temperature 98 °F (36.7 °C), temperature source Oral, resp. rate 18, height 195.6 cm (77\"), weight 84.3 kg (185 lb 12.8 oz), SpO2 99 %.  Vital signs are normal.    Output: Producing urine.    Lungs:  Normal effort.    Heart: Normal rate.    Abdomen: Abdomen is soft.  There is no abdominal tenderness.               Results Review:     I reviewed the patient's new clinical results.  I reviewed the patient's new imaging results and agree with the interpretation.    Medication Review: done    Assessment/Plan       RLS (restless legs syndrome)    Hypertension    Anti-phospholipid syndrome (CMS/HCC)    Anemia    Peripheral polyneuropathy    Postsurgical malabsorption    Long-term (current) use of anticoagulants, INR goal 2.0-3.0    Lymphedema    Pneumonia of right lower lobe due to infectious organism (CMS/HCC)    Iron deficiency    Elevated troponin      Assessment & Plan  GERD-demonstrated on esophagram.  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 fib/antiphospholipid antibody syndrome on Coumadin     twice daily PPI  Discussed GERD diet and lifestyle modification with him previously   Reglan 5 mg ac and hs po   Soft,low residue diet.   If tolerates advanced diet home soon     Chris Rizzo MD  02/15/20  12:05 PM    Time: Critical care 15 min

## 2020-02-15 NOTE — DISCHARGE SUMMARY
Date of Admission: 2/10/2020  Date of Discharge:  2/15/2020  Primary Care Physician: Brandin Bolton MD     Discharge Diagnosis:  Active Hospital Problems    Diagnosis  POA   • Gastroparesis [K31.84]  Yes   • Elevated troponin [R79.89]  Yes   • Pneumonia of right lower lobe due to infectious organism (CMS/HCC) [J18.1]  Yes   • Iron deficiency [E61.1]  Yes   • Lymphedema [I89.0]  Yes   • Long-term (current) use of anticoagulants, INR goal 2.0-3.0 [Z79.01]  Not Applicable   • Postsurgical malabsorption [K91.2]  Yes   • Peripheral polyneuropathy [G62.9]  Yes   • Anemia [D64.9]  Yes   • Anti-phospholipid syndrome (CMS/HCC) [D68.61]  Yes   • Hypertension [I10]  Yes   • RLS (restless legs syndrome) [G25.81]  Yes      Resolved Hospital Problems   No resolved problems to display.       Presenting Problem/History of Present Illness:  Rigors [R68.89]  Pneumonia of right lower lobe due to infectious organism (CMS/HCC) [J18.1]     Hospital Course:  The patient is a 70 y.o. male with history of antiphospholipid syndrome and atrial fibrillation on chronic anticoagulation as well as esophageal cancer s/p esophagectomy in 2015 who presented with cough and shortness of breath.  Please see admission H&P from 2/10/20 for further details. He was found to have right lower lobe pneumonia and was started on IV antibiotics and pulmonary toilet. Pulmonology evaluated the patient and thought that this was aspiration pneumonia.  Given his history of esophageal cancer/esophagectomy an esophagram was performed which showed some laryngeal penetration without aspiration as well as some reflux and questionable retained material in the stomach. Gastroenterology evaluated the patient and based on this they were concerned for gastroparesis.  A gastric emptying study was done and this did in fact show delayed gastric emptying-this is thought to have been due to previous surgery.  He was started on reglan and twice daily protonix per GI with improvement  "in his symptoms.  He was tolerating a soft, low residue diet at the time of discharge.  His pulmonary symptoms improved as well and he was on room air by the time of discharge.  He will need to finish his course of antibiotics with augmentin as an outpatient.  He will need to keep follow up with pulmonology and GI. He is feeling much better and is ready for discharge.  I discussed with the patient and he agrees with the plan.    Exam Today:  Blood pressure 116/62, pulse 78, temperature 98 °F (36.7 °C), temperature source Oral, resp. rate 18, height 195.6 cm (77\"), weight 84.3 kg (185 lb 12.8 oz), SpO2 100 %.  General: Alert, no distress  HEENT: EOMI, PERRL  Neck: supple, no JVD  Heart: RRR  Lungs: CTAB, effort is normal  Abdomen: soft, non-tender, non-distended, normoactive bowel sounds  Musculoskeletal: + BLE lymphedema in wraps.  No tenderness  Neurologic: oriented x3, no gross deficits  Psychiatric: appropriate mood and affect    Procedures Performed:  EXAM: BARIUM SWALLOW ESOPHAGRAM DATED 02/11/2020      FINDINGS: The preliminary PA erect chest radiographs are compared to CT  chest multiplanar sections of 01/15/2020 and with erect AP and lateral  chest radiographs of 02/10/2020 at 1318 hours. Blunting of right  costophrenic angle, trace blunting of left costophrenic angle, opacity  at the medial right lower chest compatible with gastric pull-through,  and patchy bilateral perihilar to basilar atelectasis or infiltrates are  again demonstrated. Borderline to mild cardiomegaly and some aortic  uncoiling are again demonstrated. Multiple right upper rib deformities,  apparently chronic, are again demonstrated. No pneumothorax or acute  change in bony thorax is seen. Metallic plate and screws at the lower  cervical spine are demonstrated. Monitoring lead wires are present.     The patient was reportedly unsafe to stand unsupported, and the current  exam was performed with patient on the x-ray table in " multiple  projections and with the table and patient variably horizontal to steep  reverse Trendelenburg orientation. The patient ingested thin barium  liquid mixture and water for the current exam. Rapid sequence imaging of  the neck in lateral and AP projection as the patient ingested thin  barium liquid mixture demonstrated laryngeal penetration without  aspiration. There is low normal AP diameter of mid to upper cervical  esophagus with metallic plate and screws at C6-C7 levels possibly  responsible for a trace of effacement of posterior aspect of cervical  esophagus. The transverse caliber of cervical esophagus is within normal  limits. The short remaining upper thoracic segment of esophagus has  normal distensibility. The esophagogastric anastomosis is at the level  of the manubrium and has luminal diameter of at least 1.9 cm or greater.  A large amount of food material appears to be present in the stomach. I  cannot confirm or exclude bezoar as a component of this food material.  With the patient in left posterior oblique to left lateral decubitus  position there is most optimal demonstrated emptying of some of the  gastric barium from the intrathoracic stomach into the distal  subdiaphragmatic antrum, and from there promptly into and through the  duodenum. Also in this position, the patient appears at greatest  vulnerability to gastroesophageal reflux at the anastomosis. Modest  reflux into the remaining upper thoracic esophagus was observed even  with mild reverse Trendelenburg orientation of table and patient, with  the patient in left lateral decubitus position.     A total of 2 minutes 50 seconds fluoroscopy was used. A total of 2  digital overhead radiographs and 103 digital fluoroscopic spot image  radiographs were acquired.     CONCLUSION: Laryngeal penetration without aspiration. Large amount of  food material versus food material or bezoar in the stomach. Prompt  passage of some of the ingested  barium through the esophagus, stomach  and duodenum, dependent on patient position and orientation as discussed  above. Gastroesophageal reflux at the upper thoracic esophagogastric  anastomosis was observed.    NUCLEAR MEDICINE GASTRIC EMPTYING STUDY (FOUR HOUR PROTOCOL)-2/12/20     HISTORY: Male who is 70 years-old, with a history of retained gastric  contents on esophagram.. Prior esophagectomy with gastric pull-up.     FINDINGS:   Following the administration of 600 uCi of 99m technetium sulfur colloid  mixed in a standard meal, routine analysis was performed. Imaging and  measurements obtained 1 hour intervals through 4 hours.     Residual activity at 4 hours is 82%. Normal 4-hour residual is 10% or  less. On the static images acquired from 0 to 240 minutes, there is no  visually appreciable decrease in the distribution or the intensity of  the radiotracer activity in the pulled up stomach.     IMPRESSION:  Delayed gastric emptying.    Consults:   Consults     Date and Time Order Name Status Description    2/12/2020 1034 Inpatient Gastroenterology Consult Completed     2/10/2020 2254 Inpatient Pulmonology Consult Completed     2/10/2020 1458 [facility] (on-call MD unless specified) Details Completed     1/14/2020 2359 Inpatient Pulmonology Consult Completed     1/14/2020 2359 Inpatient Cardiology Consult Completed     1/14/2020 1704 [facility] (on-call MD unless specified) Details Completed            Discharge Disposition:  Home or Self Care    Discharge Medications:     Discharge Medications      New Medications      Instructions Start Date   amoxicillin-clavulanate 875-125 MG per tablet  Commonly known as:  AUGMENTIN   1 tablet, Oral, Every 12 Hours Scheduled      guaiFENesin 600 MG 12 hr tablet  Commonly known as:  MUCINEX   1,200 mg, Oral, Every 12 Hours      metoclopramide 10 MG/10ML solution solution  Commonly known as:  REGLAN   5 mg, Oral, 4 Times Daily Before Meals & Nightly      pantoprazole 40 MG EC  tablet  Commonly known as:  PROTONIX   40 mg, Oral, 2 Times Daily Before Meals         Changes to Medications      Instructions Start Date   ferrous sulfate 325 (65 FE) MG tablet  What changed:    · how much to take  · how to take this  · when to take this   TAKE ONE TABLET BY MOUTH DAILY WITH BREAKFAST         Continue These Medications      Instructions Start Date   albuterol sulfate  (90 Base) MCG/ACT inhaler  Commonly known as:  PROVENTIL HFA;VENTOLIN HFA;PROAIR HFA   No dose, route, or frequency recorded.      furosemide 20 MG tablet  Commonly known as:  LASIX   20 mg, Oral, Daily      metoprolol tartrate 25 MG tablet  Commonly known as:  LOPRESSOR   25 mg, Oral, Every 12 Hours Scheduled      PARoxetine 40 MG tablet  Commonly known as:  PAXIL   40 mg, Oral, Every Morning      potassium chloride 10 MEQ CR tablet  Commonly known as:  K-DUR   10 mEq, Oral, Daily, Only while taking furosemide.      pramipexole 1.5 MG tablet  Commonly known as:  MIRAPEX   1.5 mg, Oral, 2 Times Daily      STIOLTO RESPIMAT 2.5-2.5 MCG/ACT aerosol solution inhaler  Generic drug:  tiotropium bromide-olodaterol   No dose, route, or frequency recorded.      tamsulosin 0.4 MG capsule 24 hr capsule  Commonly known as:  FLOMAX   1 capsule, Oral, Nightly      traZODone 100 MG tablet  Commonly known as:  DESYREL   100 mg, Oral, Nightly      warfarin 5 MG tablet  Commonly known as:  COUMADIN   2.5 mg Monday, Wednesday, Friday, Sunday ; 5 mg on Tuesday, Thursday, Saturday             Discharge Diet:   Diet Instructions     Diet: Specialty Diet, Soft Texture; Thin Liquids, No Restrictions; Whole; Low Residue      Discharge Diet:   Specialty Diet  Soft Texture       Fluid Consistency:  Thin Liquids, No Restrictions    Soft Options:  Whole    Specialty Diets:  Low Residue          Activity at Discharge:   Activity Instructions     Activity as Tolerated            Follow-up Appointments:  Future Appointments   Date Time Provider Department  [place]   2/18/2020  9:30 AM Jose Christine III, MD [facility] None   2/28/2020 10:30 AM [facility] [id number] [facility] None   3/24/2020 10:00 AM Brandin Bolton MD [facility] None   4/3/2020 10:30 AM LAB CHAIR [facility]   4/10/2020 10:20 AM VITALS ONLY [facility]   4/10/2020 10:40 AM George Phelan II, MD [facility]   6/5/2020 10:30 AM LAB CHAIR [facility]   6/12/2020 10:00 AM VITALS ONLY [facility]   6/12/2020 10:20 AM George Phelan II, MD [facility]   7/29/2020 12:00 PM James Cyr MD [facility] None     Additional Instructions for the Follow-ups that You Need to Schedule     Discharge Follow-up with PCP   As directed       Currently Documented PCP:    Brandin Bolton MD    PCP Phone Number:    [phone]     Follow Up Details:  1 week         Discharge Follow-up with Specified Provider: Dr. Valencia (Gastroenterology)   As directed      To:  Dr. Valencia (Gastroenterology)    Follow Up Details:  2-3 weeks         Discharge Follow-up with Specified Provider: Pulmonology ([facility]); 3 Weeks   As directed      To:  Pulmonology ([facility])    Follow Up:  3 Weeks               Test Results Pending at Discharge:   Order Current Status    Blood Culture - Blood, Arm, Left Preliminary result    Blood Culture - Blood, Arm, Right Preliminary result    Respiratory Culture - Sputum, Cough Preliminary result           Albert Del Angel MD  02/15/20  1:15 PM    Time Spent on Discharge Activities: Greater than 30 minutes.

## 2020-02-16 LAB
ANION GAP SERPL CALCULATED.3IONS-SCNC: 12 MMOL/L (ref 5–15)
BASOPHILS # BLD AUTO: 0.03 10*3/MM3 (ref 0–0.2)
BASOPHILS NFR BLD AUTO: 0.5 % (ref 0–1.5)
BUN BLD-MCNC: 26 MG/DL (ref 8–23)
BUN/CREAT SERPL: 20.2 (ref 7–25)
CALCIUM SPEC-SCNC: 8.6 MG/DL (ref 8.6–10.5)
CHLORIDE SERPL-SCNC: 98 MMOL/L (ref 98–107)
CO2 SERPL-SCNC: 23 MMOL/L (ref 22–29)
CREAT BLD-MCNC: 1.29 MG/DL (ref 0.76–1.27)
DEPRECATED RDW RBC AUTO: 44 FL (ref 37–54)
EOSINOPHIL # BLD AUTO: 0.16 10*3/MM3 (ref 0–0.4)
EOSINOPHIL NFR BLD AUTO: 2.7 % (ref 0.3–6.2)
ERYTHROCYTE [DISTWIDTH] IN BLOOD BY AUTOMATED COUNT: 14 % (ref 12.3–15.4)
GFR SERPL CREATININE-BSD FRML MDRD: 55 ML/MIN/1.73
GLUCOSE BLD-MCNC: 116 MG/DL (ref 65–99)
HCT VFR BLD AUTO: 27.9 % (ref 37.5–51)
HGB BLD-MCNC: 9 G/DL (ref 13–17.7)
IMM GRANULOCYTES # BLD AUTO: 0.02 10*3/MM3 (ref 0–0.05)
IMM GRANULOCYTES NFR BLD AUTO: 0.3 % (ref 0–0.5)
INR PPP: 2.32 (ref 0.9–1.1)
LYMPHOCYTES # BLD AUTO: 0.71 10*3/MM3 (ref 0.7–3.1)
LYMPHOCYTES NFR BLD AUTO: 12 % (ref 19.6–45.3)
MCH RBC QN AUTO: 28.6 PG (ref 26.6–33)
MCHC RBC AUTO-ENTMCNC: 32.3 G/DL (ref 31.5–35.7)
MCV RBC AUTO: 88.6 FL (ref 79–97)
MONOCYTES # BLD AUTO: 0.61 10*3/MM3 (ref 0.1–0.9)
MONOCYTES NFR BLD AUTO: 10.3 % (ref 5–12)
NEUTROPHILS # BLD AUTO: 4.37 10*3/MM3 (ref 1.7–7)
NEUTROPHILS NFR BLD AUTO: 74.2 % (ref 42.7–76)
NRBC BLD AUTO-RTO: 0 /100 WBC (ref 0–0.2)
PLATELET # BLD AUTO: 205 10*3/MM3 (ref 140–450)
PMV BLD AUTO: 9.3 FL (ref 6–12)
POTASSIUM BLD-SCNC: 4.4 MMOL/L (ref 3.5–5.2)
PROTHROMBIN TIME: 25.2 SECONDS (ref 11.7–14.2)
RBC # BLD AUTO: 3.15 10*6/MM3 (ref 4.14–5.8)
SODIUM BLD-SCNC: 133 MMOL/L (ref 136–145)
URATE SERPL-MCNC: 5.4 MG/DL (ref 3.4–7)
WBC NRBC COR # BLD: 5.9 10*3/MM3 (ref 3.4–10.8)

## 2020-02-16 PROCEDURE — 85025 COMPLETE CBC W/AUTO DIFF WBC: CPT | Performed by: NURSE PRACTITIONER

## 2020-02-16 PROCEDURE — 99232 SBSQ HOSP IP/OBS MODERATE 35: CPT | Performed by: INTERNAL MEDICINE

## 2020-02-16 PROCEDURE — 94799 UNLISTED PULMONARY SVC/PX: CPT

## 2020-02-16 PROCEDURE — 80048 BASIC METABOLIC PNL TOTAL CA: CPT | Performed by: NURSE PRACTITIONER

## 2020-02-16 PROCEDURE — 97110 THERAPEUTIC EXERCISES: CPT

## 2020-02-16 PROCEDURE — 85610 PROTHROMBIN TIME: CPT | Performed by: INTERNAL MEDICINE

## 2020-02-16 PROCEDURE — 84550 ASSAY OF BLOOD/URIC ACID: CPT | Performed by: NURSE PRACTITIONER

## 2020-02-16 RX ORDER — FERROUS SULFATE 325(65) MG
325 TABLET ORAL 2 TIMES DAILY WITH MEALS
Status: DISCONTINUED | OUTPATIENT
Start: 2020-02-16 | End: 2020-02-19 | Stop reason: HOSPADM

## 2020-02-16 RX ADMIN — AMOXICILLIN AND CLAVULANATE POTASSIUM 1 TABLET: 875; 125 TABLET, FILM COATED ORAL at 08:36

## 2020-02-16 RX ADMIN — FERROUS SULFATE TAB 325 MG (65 MG ELEMENTAL FE) 325 MG: 325 (65 FE) TAB at 17:46

## 2020-02-16 RX ADMIN — IPRATROPIUM BROMIDE AND ALBUTEROL SULFATE 3 ML: 2.5; .5 SOLUTION RESPIRATORY (INHALATION) at 17:08

## 2020-02-16 RX ADMIN — METOCLOPRAMIDE HYDROCHLORIDE 5 MG: 5 SOLUTION ORAL at 22:18

## 2020-02-16 RX ADMIN — TAMSULOSIN HYDROCHLORIDE 0.4 MG: 0.4 CAPSULE ORAL at 22:19

## 2020-02-16 RX ADMIN — METOCLOPRAMIDE HYDROCHLORIDE 5 MG: 5 SOLUTION ORAL at 12:03

## 2020-02-16 RX ADMIN — POTASSIUM CHLORIDE 10 MEQ: 750 CAPSULE, EXTENDED RELEASE ORAL at 08:36

## 2020-02-16 RX ADMIN — PAROXETINE 40 MG: 20 TABLET, FILM COATED ORAL at 08:35

## 2020-02-16 RX ADMIN — FERROUS SULFATE TAB 325 MG (65 MG ELEMENTAL FE) 325 MG: 325 (65 FE) TAB at 08:35

## 2020-02-16 RX ADMIN — GUAIFENESIN 1200 MG: 600 TABLET, EXTENDED RELEASE ORAL at 22:17

## 2020-02-16 RX ADMIN — METOCLOPRAMIDE HYDROCHLORIDE 5 MG: 5 SOLUTION ORAL at 16:52

## 2020-02-16 RX ADMIN — SODIUM CHLORIDE, PRESERVATIVE FREE 10 ML: 5 INJECTION INTRAVENOUS at 22:21

## 2020-02-16 RX ADMIN — FUROSEMIDE 20 MG: 20 TABLET ORAL at 08:36

## 2020-02-16 RX ADMIN — SODIUM CHLORIDE, PRESERVATIVE FREE 10 ML: 5 INJECTION INTRAVENOUS at 08:36

## 2020-02-16 RX ADMIN — PANTOPRAZOLE SODIUM 40 MG: 40 TABLET, DELAYED RELEASE ORAL at 16:52

## 2020-02-16 RX ADMIN — WARFARIN SODIUM 2.5 MG: 2.5 TABLET ORAL at 17:46

## 2020-02-16 RX ADMIN — IPRATROPIUM BROMIDE AND ALBUTEROL SULFATE 3 ML: 2.5; .5 SOLUTION RESPIRATORY (INHALATION) at 09:08

## 2020-02-16 RX ADMIN — PRAMIPEXOLE DIHYDROCHLORIDE 0.5 MG: 0.5 TABLET ORAL at 08:36

## 2020-02-16 RX ADMIN — METOCLOPRAMIDE HYDROCHLORIDE 5 MG: 5 SOLUTION ORAL at 08:35

## 2020-02-16 RX ADMIN — IPRATROPIUM BROMIDE AND ALBUTEROL SULFATE 3 ML: 2.5; .5 SOLUTION RESPIRATORY (INHALATION) at 12:57

## 2020-02-16 RX ADMIN — PRAMIPEXOLE DIHYDROCHLORIDE 0.5 MG: 0.5 TABLET ORAL at 22:18

## 2020-02-16 RX ADMIN — GUAIFENESIN 1200 MG: 600 TABLET, EXTENDED RELEASE ORAL at 08:35

## 2020-02-16 RX ADMIN — IPRATROPIUM BROMIDE AND ALBUTEROL SULFATE 3 ML: 2.5; .5 SOLUTION RESPIRATORY (INHALATION) at 21:14

## 2020-02-16 RX ADMIN — TRAZODONE HYDROCHLORIDE 100 MG: 100 TABLET ORAL at 22:19

## 2020-02-16 RX ADMIN — AMOXICILLIN AND CLAVULANATE POTASSIUM 1 TABLET: 875; 125 TABLET, FILM COATED ORAL at 22:18

## 2020-02-16 RX ADMIN — PANTOPRAZOLE SODIUM 40 MG: 40 TABLET, DELAYED RELEASE ORAL at 08:35

## 2020-02-16 NOTE — PLAN OF CARE
Problem: Patient Care Overview  Goal: Plan of Care Review  Outcome: Ongoing (interventions implemented as appropriate)   No acute respiratory distress noted. Occasional nonproductive cough. No c/o pain or nausea. Sat in chair for long period, tolerated well. Bilateral lower extremities remain wrapped. Rested well. Will continue to monitor.

## 2020-02-16 NOTE — PROGRESS NOTES
Progress Note    Name: Jose Hurtado ADMIT: 2/10/2020   : 1950  PCP: Brandin Bolton MD    MRN: [id number] LOS: 6 days   AGE/SEX: 70 y.o. male  ROOM: N630/1   Date of Encounter Visit: 20    Subjective:     Interval History: patient was to be discharged yesterday, but was unable to stand. PT evaluated today and he was only able to walk 30 feet with LLE pain and weakness especially of left knee. He did not sleep well last night due to worsening restless legs.     REVIEW OF SYSTEMS:   no fever or chills.   No chest pain, palpitations. Edema overall improved.    No shortness of breath, cough or sputum.   No nausea, vomiting or diarrhea.   L knee pain. Restless legs worse last night.   Tired today.     Objective:   Temp:  [98 °F (36.7 °C)-99.8 °F (37.7 °C)] 98 °F (36.7 °C)  Heart Rate:  [78-92] 87  Resp:  [18-20] 20  BP: (111-133)/(62-76) 131/76   SpO2:  [94 %-100 %] 100 %  on    Device (Oxygen Therapy): room air    Intake/Output Summary (Last 24 hours) at 2020 1021  Last data filed at 2020 0900  Gross per 24 hour   Intake 700 ml   Output 1600 ml   Net -900 ml     Body mass index is 21.71 kg/m².      20  0552 02/15/20  0613 20  0540   Weight: 95.1 kg (209 lb 11.2 oz) 84.3 kg (185 lb 12.8 oz) 83.1 kg (183 lb 1.6 oz)     Weight change: -1.225 kg (-2 lb 11.2 oz)    Physical Exam   Constitutional: No distress.   HENT:   Head: Atraumatic.   Nose: Nose normal.   Eyes: Conjunctivae are normal.   Cardiovascular: Normal rate and regular rhythm.   No murmur heard.  Pulmonary/Chest: Effort normal. He has no wheezes. He has no rales.   Diminished bases   Abdominal: Soft. Bowel sounds are normal. There is no tenderness.   Genitourinary:   Genitourinary Comments: King present   Musculoskeletal: He exhibits edema (2-3+ BLE with bilateral wraps).   Decreased ROM of left knee and mild edema   Neurological: He is alert.   Skin: Skin is warm and dry. He is not diaphoretic.       Results Review:           Results from last 7 days   Lab Units 02/13/20  0533 02/12/20  0544 02/11/20  0439 02/10/20  1251   SODIUM mmol/L 138 138 139 141   POTASSIUM mmol/L 4.1 4.1 4.1 4.8   CHLORIDE mmol/L 103 103 104 104   CO2 mmol/L 24.5 24.6 25.5 26.0   BUN mg/dL 14 14 20 22   CREATININE mg/dL 0.93 1.00 1.05 1.23   GLUCOSE mg/dL 89 89 117* 98   CALCIUM mg/dL 8.1* 8.4* 8.3* 9.0   AST (SGOT) U/L  --   --   --  36   ALT (SGPT) U/L  --   --   --  23     Estimated Creatinine Clearance: 84.4 mL/min (by C-G formula based on SCr of 0.93 mg/dL).          Results from last 7 days   Lab Units 02/11/20  1149 02/11/20  0439   TROPONIN T ng/mL 0.039* 0.046*             Results from last 7 days   Lab Units 02/11/20  0439   MAGNESIUM mg/dL 2.0           Invalid input(s): LDLCALC  Results from last 7 days   Lab Units 02/15/20  0706 02/13/20  0533 02/12/20  0544 02/11/20  0439 02/10/20  1251   WBC 10*3/mm3  --  4.94 5.43 7.21 4.71   HEMOGLOBIN g/dL 9.3* 8.1* 7.8* 7.8* 10.1*   HEMATOCRIT % 28.4* 24.9* 23.6* 23.6* 31.2*   PLATELETS 10*3/mm3  --  159 161 155 198   MCV fL  --  87.7 87.4 88.4 89.9   MCH pg  --  28.5 28.9 29.2 29.1   MCHC g/dL  --  32.5 33.1 33.1 32.4   RDW %  --  14.1 14.2 14.1 14.1   RDW-SD fl  --  44.6 45.2 44.6 46.3   MPV fL  --  9.2 9.4 9.3 8.9   NEUTROPHIL % %  --  73.9 73.4 76.8* 80.9*   LYMPHOCYTE % %  --  11.7* 13.8* 15.7* 12.3*   MONOCYTES % %  --  8.1 7.0 4.6* 4.9*   EOSINOPHIL % %  --  5.7 5.0 2.5 1.5   BASOPHIL % %  --  0.2 0.6 0.1 0.2   IMM GRAN % %  --  0.4 0.2 0.3 0.2   NEUTROS ABS 10*3/mm3  --  3.65 3.99 5.54 3.81   LYMPHS ABS 10*3/mm3  --  0.58* 0.75 1.13 0.58*   MONOS ABS 10*3/mm3  --  0.40 0.38 0.33 0.23   EOS ABS 10*3/mm3  --  0.28 0.27 0.18 0.07   BASOS ABS 10*3/mm3  --  0.01 0.03 0.01 0.01   IMMATURE GRANS (ABS) 10*3/mm3  --  0.02 0.01 0.02 0.01   NRBC /100 WBC  --  0.0 0.0 0.0 0.0     Results from last 7 days   Lab Units 02/16/20  0633 02/15/20  0706 02/14/20  0529 02/13/20  0533 02/12/20  0543 02/11/20  0439  02/10/20  2048   INR  2.32* 2.32* 1.99* 2.05* 2.93* 4.22* 3.76*         Results from last 7 days   Lab Units 02/11/20  0028 02/10/20  1251   PROCALCITONIN ng/mL 1.37*  --    LACTATE mmol/L  --  1.0             Results from last 7 days   Lab Units 02/10/20  1531 02/10/20  1251   BLOODCX  No growth at 5 days No growth at 5 days     Results from last 7 days   Lab Units 02/10/20  1300   ADENOVIRUS DETECTION BY PCR  Not Detected   CORONAVIRUS 229E  Not Detected   CORONAVIRUS HKU1  Not Detected   CORONAVIRUS NL63  Not Detected   CORONAVIRUS OC43  Not Detected   HUMAN METAPNEUMOVIRUS  Not Detected   HUMAN RHINOVIRUS/ENTEROVIRUS  Not Detected   INFLUENZA B PCR  Not Detected   PARAINFLUENZA 1  Not Detected   PARAINFLUENZA VIRUS 2  Not Detected   PARAINFLUENZA VIRUS 3  Not Detected   PARAINFLUENZA VIRUS 4  Not Detected   BORDETELLA PERTUSSIS PCR  Not Detected   CHLAMYDOPHILA PNEUMONIAE PCR  Not Detected   MYCOPLAMA PNEUMO PCR  Not Detected   INFLUENZA A PCR  Not Detected   INFLUENZA A H3  Not Detected   INFLUENZA A H1  Not Detected   RSV, PCR  Not Detected     Results from last 7 days   Lab Units 02/10/20  1352   NITRITE UA  Negative   WBC UA /HPF 6-12*   BACTERIA UA /HPF None Seen   SQUAM EPITHEL UA /HPF 0-2           Imaging:  Imaging Results (Last 24 Hours)     ** No results found for the last 24 hours. **          I reviewed the patient's new clinical results and medications.         Medication Review:   Scheduled Meds:  amoxicillin-clavulanate 1 tablet Oral Q12H   ferrous sulfate 325 mg Oral QAM AC   furosemide 20 mg Oral Daily   guaiFENesin 1,200 mg Oral Q12H   ipratropium-albuterol 3 mL Nebulization 4x Daily - RT   metoclopramide 5 mg Oral 4x Daily AC & at Bedtime   pantoprazole 40 mg Oral BID AC   PARoxetine 40 mg Oral QAM   potassium chloride 10 mEq Oral Daily   pramipexole 0.5 mg Oral BID   tamsulosin 0.4 mg Oral Nightly   traZODone 100 mg Oral Nightly   warfarin 2.5 mg Oral Daily     Continuous Infusions:  Pharmacy  to dose warfarin      PRN Meds:.•  acetaminophen **OR** acetaminophen **OR** acetaminophen  •  albuterol  •  albuterol  •  bisacodyl  •  calcium carbonate  •  docusate sodium  •  melatonin  •  muscle rub  •  nitroglycerin  •  ondansetron **OR** ondansetron  •  Pharmacy to dose warfarin  •  sodium chloride    Problem List:     Active Hospital Problems    Diagnosis  POA   • Gastroparesis [K31.84]  Yes   • Elevated troponin [R79.89]  Yes   • Pneumonia of right lower lobe due to infectious organism (CMS/HCC) [J18.1]  Yes   • Iron deficiency [E61.1]  Yes   • Lymphedema [I89.0]  Yes   • Long-term (current) use of anticoagulants, INR goal 2.0-3.0 [Z79.01]  Not Applicable   • Postsurgical malabsorption [K91.2]  Yes   • Peripheral polyneuropathy [G62.9]  Yes   • Anemia [D64.9]  Yes   • Anti-phospholipid syndrome (CMS/HCC) [D68.61]  Yes   • Hypertension [I10]  Yes   • RLS (restless legs syndrome) [G25.81]  Yes      Resolved Hospital Problems   No resolved problems to display.       Assessment and Plan:     The patient is a 70 y.o. male with history of antiphospholipid syndrome, afib on coumadin and esophageal cancer s/p esophagectomy in 2015 who presented with cough and shortness of breath. He was found to have RLL pneumonia.     LLE weakness  -weakness and pain may be due to underlying OA vs possible gout.   -check BMP, CBC and uric acid    Iron deficiency anemia  -recent iron studies on 2/11/20 were low. Has been on chronic oral iron with worsening leg cramps.   -increased iron supplement to BID    RLL pneumonia/ pleural effusion  -possible aspiration component  -continue planned course of Augmentin  -follow up with cardiology     Gastroparesis  -delayed gastric emptying. Symptoms improved on Reglan.   -apprec GI input    Afib/ chronic anticoagulation  -INR therapeutic at 2.32  - continue coumadin     Urinary retention  -lama present on admit. Will continue  - follow up with urology as outpatient    VTE prophylaxis:  Warfarin (home med)  CODE status: DNR  Disposition: TBD. CCP consult to eval for rehab placement given mobility decline    I discussed the patients findings and my recommendations with patient and nursing staff.    FAYE George  02/16/20  10:21 AM

## 2020-02-16 NOTE — PROGRESS NOTES
Pharmacy Consult: Warfarin Dosing/ Monitoring    Jose Hurtado is a 70 y.o. male, estimated creatinine clearance is 85.6 mL/min (by C-G formula based on SCr of 0.93 mg/dL). weighing 84.3 kg (185 lb 12.8 oz).    He has a past medical history of Anemia, Anti-phospholipid syndrome (CMS/HCC), Bladder disorder, Community acquired pneumonia, Deep venous thrombosis (CMS/HCC) (, ), Depression, Esophageal carcinoma (CMS/HCC) (2014), Hemorrhoids, HH (hiatus hernia), History of atrial fibrillation (), History of kidney stones, History of nephrolithiasis, History of pancreatitis, History of radiation therapy, Hypertension, Long-term (current) use of anticoagulants, INR goal 2.0-3.0, Lymphedema, Malignant neoplasm of prostate (CMS/HCC), Other hyperlipidemia (2018), Restless legs syndrome, Shortness of breath, and Squamous carcinoma.    Social History     Tobacco Use    Smoking status: Former Smoker     Packs/day: 2.00     Years: 3.00     Pack years: 6.00     Types: Cigarettes     Last attempt to quit: 1974     Years since quittin.1    Smokeless tobacco: Never Used    Tobacco comment: no caffeine    Substance Use Topics    Alcohol use: Yes     Frequency: 2-3 times a week     Comment: 2-3 x per week    Drug use: No       Results from last 7 days   Lab Units 02/15/20  0706 20  0529 20  0533 20  0544 20  0543 20  0439 02/10/20  2048 02/10/20  1251   INR  2.32* 1.99* 2.05*  --  2.93* 4.22* 3.76* 3.04*   HEMOGLOBIN g/dL 9.3*  --  8.1* 7.8*  --  7.8*  --  10.1*   HEMATOCRIT % 28.4*  --  24.9* 23.6*  --  23.6*  --  31.2*   PLATELETS 10*3/mm3  --   --  159 161  --  155  --  198     Results from last 7 days   Lab Units 20  0533 20  0544 20  0439 02/10/20  1251   SODIUM mmol/L 138 138 139 141   POTASSIUM mmol/L 4.1 4.1 4.1 4.8   CHLORIDE mmol/L 103 103 104 104   CO2 mmol/L 24.5 24.6 25.5 26.0   BUN mg/dL 14 14 20 22   CREATININE mg/dL 0.93 1.00 1.05 1.23   CALCIUM  mg/dL 8.1* 8.4* 8.3* 9.0   BILIRUBIN mg/dL  --   --   --  0.3   ALK PHOS U/L  --   --   --  112   ALT (SGPT) U/L  --   --   --  23   AST (SGOT) U/L  --   --   --  36   GLUCOSE mg/dL 89 89 117* 98     Anticoagulation history: warfarin 2.5 mg every M/W/F/Sun; 5 mg all other days     Hospital Anticoagulation:  Consulting provider: Dr. Fiona Barba MD  Start date: Prior to admission   Indication: antiphospholipid syndrome   Target INR: 2-3  Expected duration: indefinite   Bridge Therapy: No                Date 2/10 2/11 2/12 2/13 2/14 2/15 2/16      INR 3.04 4.22 2.93 2.05 1.99 2.32 2.32      Warfarin dose HOLD HOLD 1.5mg 1.5 2.5 mg 2.5 mg 2.5 mg        Potential drug interactions: paroxetine  Augmentin - inc INR    Relevant nutrition status: regular diet, cardiac low sodium    Education complete: Yes  Date: 2/11/2020    Assessment/Plan:  INR at goal, continue current dose.  Monitor s/sx of bleeding.  H/H stably low this am.    Pharmacy will continue to follow until discharge or discontinuation of warfarin.   Amilcar Garcia III, [facility]  2/15/2020

## 2020-02-16 NOTE — THERAPY TREATMENT NOTE
Patient Name: Jose Hurtado  : 1950    MRN: [id number]                              Today's Date: 2020       Admit Date: 2/10/2020    Visit Dx:     ICD-10-CM ICD-9-CM   1. Pneumonia of right lower lobe due to infectious organism (CMS/HCC) J18.1 486   2. Rigors R68.89 780.99     Patient Active Problem List   Diagnosis   • Adenocarcinoma of esophagus (CMS/HCC)   • Adenomatous polyp of colon   • Malignant neoplasm of prostate (CMS/HCC)   • RLS (restless legs syndrome)   • Depression   • Hypertension   • Anti-phospholipid syndrome (CMS/HCC)   • Anemia   • Insomnia due to other mental disorder   • Peripheral polyneuropathy   • B12 deficiency   • Postsurgical malabsorption   • Shortness of breath   • Long-term (current) use of anticoagulants, INR goal 2.0-3.0   • Lymphedema   • Pneumonia of right lower lobe due to infectious organism (CMS/HCC)   • Iron deficiency   • Elevated troponin   • Gastroparesis     Past Medical History:   Diagnosis Date   • Anemia    • Anti-phospholipid syndrome (CMS/HCC)     On lifelong anticoagulation therapy   • Bladder disorder     LEAKAGE   ON MED  wers pads   • Community acquired pneumonia     HISTORY OF IN    • Deep venous thrombosis (CMS/HCC) ,     Left lower extremity multiple   • Depression    • Esophageal carcinoma (CMS/HCC) 2014    had chemo and radiation prior surgery   • Hemorrhoids    • HH (hiatus hernia)    • History of atrial fibrillation 2015    ONE EPISODE WHILE HOSPITALIZED   • History of kidney stones    • History of nephrolithiasis    • History of pancreatitis     PT STATES MANY YEARS AGO   • History of radiation therapy    • Hypertension    • Long-term (current) use of anticoagulants, INR goal 2.0-3.0    • Lymphedema    • Malignant neoplasm of prostate (CMS/HCC)    • Other hyperlipidemia 2018 lipid panel risk 12.8%   • Restless legs syndrome    • Shortness of breath    • Squamous carcinoma     on the head     Past  Surgical History:   Procedure Laterality Date   • APPENDECTOMY  1950   • BRONCHOSCOPY      (Diagnostic)   • CATARACT EXTRACTION Bilateral 2014   • COLONOSCOPY  12/15/2014    Complete / Description: EH, IH, torts, stool, follow-up colonoscopy due in 5 years.   • COLONOSCOPY N/A 6/13/2017    non-thrombosed external hemorrhoids, normal examined ileum, IH   • COLONOSCOPY N/A 2/26/2019    Procedure: COLONOSCOPY with Cold Polypectomy;  Surgeon: Mulugeta Millan MD;  Location: [facility] ENDOSCOPY;  Service: Gastroenterology   • CYSTOSCOPY W/ LASER LITHOTRIPSY     • ENDOSCOPY N/A 6/13/2017    Procedure: ESOPHAGOGASTRODUODENOSCOPY WITH COLD BIOPSY;  Surgeon: Lake Gonzalez MD;  Location: [facility] ENDOSCOPY;  Service:    • ESOPHAGECTOMY      April 2015, stage IIB esophageal carcinoma, sub-total resection.   • ESOPHAGECTOMY      Esophagectomy Subtotal [place] Procedure   • EXCISION LESION  08/2012    Removal of Squamous Cell CA on Head   • HAMMER TOE REPAIR  09/2014    Hammertoe Operation (Each Toe), 10/2014   • HAMMER TOE REPAIR Left 10/3/2017    Procedure: Left second third and fourth distal interphalangeal joint resection with flexor tenotomy;  Surgeon: Mulugeta Lira MD;  Location: [facility] OR OSC;  Service:    • HERNIA REPAIR      incisional   • JEJUNOSTOMY      Laparoscopic   • JEJUNOSTOMY      tube removal    • KNEE SURGERY Bilateral 1967, 1973, 1981   • PATELLA SURGERY Left     removed   • PILONIDAL CYST / SINUS EXCISION     • AK TOTAL KNEE ARTHROPLASTY Right 3/26/2018    Procedure: TOTAL KNEE ARTHROPLASTY;  Surgeon: Renny Solis MD;  Location: [facility] MAIN OR;  Service: Orthopedics   • PROSTATECTOMY  2010   • PYLOROPLASTY     • SPINAL FUSION  02/1998    C 5,6   • TONSILLECTOMY  1955   • TONSILLECTOMY     • UPPER GASTROINTESTINAL ENDOSCOPY  12/15/2014    LA Grade D esophagitis, Pardo's, HH, multiple duodenal ulcers   • VENTRAL/INCISIONAL HERNIA REPAIR N/A 4/14/2016    Procedure: VENTRAL/INCISIONAL  HERNIA REPAIR, open, with mesh, and component separation;  Surgeon: Darren Rivas MD;  Location: [facility] MAIN OR;  Service:      General Information     Row Name 02/16/20 0904          PT Evaluation Time/Intention    Document Type  therapy note (daily note)  -SI     Mode of Treatment  physical therapy  -SI     Row Name 02/16/20 0904          General Information    Existing Precautions/Restrictions  fall  -SI     Row Name 02/16/20 0904          Resource/Environmental Concerns    Current Living Arrangements  home/apartment/condo  -SI     Row Name 02/16/20 0904          Safety Issues, Functional Mobility    Impairments Affecting Function (Mobility)  strength  -SI     Comment, Safety Issues/Impairments (Mobility)  Pt upset that hasn't walked in couple days and LLE feeling weak  -SI       User Key  (r) = Recorded By, (t) = Taken By, (c) = Cosigned By    Initials Name Provider Type    SI Rebecca Graves PTA Physical Therapy Assistant        Mobility     Row Name 02/16/20 0905          Bed Mobility Assessment/Treatment    Bed Mobility Assessment/Treatment  bed mobility (all) activities  -SI     [place] Level (Bed Mobility)  independent  -SI     Row Name 02/16/20 0905          Sit-Stand Transfer    Sit-Stand [place] (Transfers)  minimum assist (75% patient effort)  -SI     Assistive Device (Sit-Stand Transfers)  walker, front-wheeled  -SI     Row Name 02/16/20 0905          Gait/Stairs Assessment/Training    Gait/Stairs Assessment/Training  gait/ambulation independence;gait/ambulation assistive device  -SI     [place] Level (Gait)  minimum assist (75% patient effort)  -SI     Assistive Device (Gait)  walker, front-wheeled  -SI     Distance in Feet (Gait)  30   -SI     Pattern (Gait)  step-through  -SI     Deviations/Abnormal Patterns (Gait)  jf decreased;stride length decreased bilat flex knee gait  -SI     Bilateral Gait Deviations  forward flexed posture  -SI       User Key  (r) = Recorded  By, (t) = Taken By, (c) = Cosigned By    Initials Name Provider Type    Rebecca Guzman PTA Physical Therapy Assistant        Obj/Interventions     Row Name 02/16/20 0906          Therapeutic Exercise    Lower Extremity Range of Motion (Therapeutic Exercise)  -- BLE AROM, and SAQ's x 20 (tight HS and decrease TKE  -SI     Row Name 02/16/20 0906          Static Sitting Balance    Level of [place] (Unsupported Sitting, Static Balance)  independent  -SI     Row Name 02/16/20 0906          Dynamic Sitting Balance    Level of [place], Reaches Outside Midline (Sitting, Dynamic Balance)  independent  -SI     Row Name 02/16/20 0906          Static Standing Balance    Level of [place] (Supported Standing, Static Balance)  contact guard assist  -SI       User Key  (r) = Recorded By, (t) = Taken By, (c) = Cosigned By    Initials Name Provider Type    Rebecca Guzman PTA Physical Therapy Assistant        Goals/Plan    No documentation.       Clinical Impression     Row Name 02/16/20 0907          Pain Scale: Numbers Pre/Post-Treatment    Pain Scale: Numbers, Pretreatment  1/10  -SI     Pain Scale: Numbers, Post-Treatment  1/10  -SI     Pain Location - Side  Left  -SI     Pain Location  knee  -SI     Pain Intervention(s)  Repositioned  -     Row Name 02/16/20 0907          Plan of Care Review    Plan of Care Reviewed With  patient  -SI     Progress  declining  -[facility] Name 02/16/20 0907          Vital Signs    Pre SpO2 (%)  94  -SI     O2 Delivery Pre Treatment  room air  -SI     Post SpO2 (%)  92  -SI     O2 Delivery Post Treatment  room air  -[facility] Name 02/16/20 0907          Positioning and Restraints    Pre-Treatment Position  in bed  -SI     Post Treatment Position  chair  -SI     In Chair  call light within reach;exit alarm on;reclined  -SI       User Key  (r) = Recorded By, (t) = Taken By, (c) = Cosigned By    Initials Name Provider Type    Rebecca Guzman PTA Physical Therapy  Assistant        Outcome Measures     Row Name 02/16/20 0909          How much help from another person do you currently need...    Turning from your back to your side while in flat bed without using bedrails?  4  -SI     Moving from lying on back to sitting on the side of a flat bed without bedrails?  4  -SI     Moving to and from a bed to a chair (including a wheelchair)?  3  -SI     Standing up from a chair using your arms (e.g., wheelchair, bedside chair)?  3  -SI     Climbing 3-5 steps with a railing?  2  -SI     To walk in hospital room?  3  -SI     AM-PAC 6 Clicks Score (PT)  19  -SI       User Key  (r) = Recorded By, (t) = Taken By, (c) = Cosigned By    Initials Name Provider Type    Rebecca Guzman PTA Physical Therapy Assistant          PT Recommendation and Plan     Plan of Care Reviewed With: patient  Progress: declining     Time Calculation:   PT Charges     Row Name 02/16/20 0912             Time Calculation    Start Time  0845  -SI      Stop Time  0905  -SI      Time Calculation (min)  20 min  -SI      PT Received On  02/16/20  -SI      PT - Next Appointment  02/17/20  -SI         Time Calculation- PT    Total Timed Code Minutes- PT  20 minute(s)  -SI        User Key  (r) = Recorded By, (t) = Taken By, (c) = Cosigned By    Initials Name Provider Type    Rebecca Guzman PTA Physical Therapy Assistant        Therapy Charges for Today     Code Description Service Date Service Provider Modifiers Qty    [id number] HC PT THER PROC EA 15 MIN 2/16/2020 Rebecca Graves PTA GP 1          PT G-Codes  Outcome Measure Options: AM-PAC 6 Clicks Daily Activity (OT)  AM-PAC 6 Clicks Score (PT): 19  AM-PAC 6 Clicks Score (OT): 23    Rebecca Graves PTA  2/16/2020

## 2020-02-16 NOTE — PLAN OF CARE
Pt requiring more assist than previous days for sit to stand and to ambulate 30 feet with RWX.  Pt instructed in positioning to prevent knee flexion contractures.  C/o left knee pain and LLE weakness.

## 2020-02-16 NOTE — PLAN OF CARE
Patient encourage to increase mobility.  Patient worked with PT.  Patient did exercises at the bedside in addition to PT.

## 2020-02-16 NOTE — PROGRESS NOTES
"   LOS: 6 days   Patient Care Team:  Brandin Bolton MD as PCP - General (Internal Medicine)  Tapan, George THORPE II, MD as Consulting Physician (Hematology and Oncology)  Jose Inman MD as Consulting Physician (Urology)  Mulugeta Millan MD as Consulting Physician (Gastroenterology)  Seth Randhawa MD as Consulting Physician (Ophthalmology)    Chief Complaint:  gastroparesis    Subjective     History of Present Illness  Eating well, less cough in good spirits   Subjective:  Symptoms:  Improved.    Diet:  Adequate intake.    Activity level: Impaired due to weakness.    Pain:  He reports no pain.        History taken from: patient chart family    Objective     Vital Signs  Temp:  [98 °F (36.7 °C)-99.8 °F (37.7 °C)] 98.7 °F (37.1 °C)  Heart Rate:  [75-92] 75  Resp:  [16-20] 18  BP: (119-133)/(62-76) 119/63    Objective:  General Appearance:  Comfortable.    Vital signs: (most recent): Blood pressure 119/63, pulse 75, temperature 98.7 °F (37.1 °C), temperature source Oral, resp. rate 18, height 195.6 cm (77\"), weight 83.1 kg (183 lb 1.6 oz), SpO2 94 %.  Vital signs are normal.    Output: Producing urine.    Lungs:  Normal effort.    Heart: Normal rate.    Abdomen: Abdomen is soft.  There is no abdominal tenderness.               Results Review:     I reviewed the patient's new clinical results.    Medication Review: done    Assessment/Plan       RLS (restless legs syndrome)    Hypertension    Anti-phospholipid syndrome (CMS/HCC)    Anemia    Peripheral polyneuropathy    Postsurgical malabsorption    Long-term (current) use of anticoagulants, INR goal 2.0-3.0    Lymphedema    Pneumonia of right lower lobe due to infectious organism (CMS/HCC)    Iron deficiency    Elevated troponin    Gastroparesis      Assessment & Plan      GERD-demonstrated on esophagram.  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    Stable for discharge, transfer to rehab from gi perspective  BGA happy to see as needed.maximo Rizzo MD  02/16/20  3:18 PM    Time: Critical care 12 min

## 2020-02-17 PROBLEM — M54.2 NECK PAIN: Status: ACTIVE | Noted: 2020-02-17

## 2020-02-17 LAB
ANION GAP SERPL CALCULATED.3IONS-SCNC: 11.2 MMOL/L (ref 5–15)
BACTERIA SPEC RESP CULT: ABNORMAL
BACTERIA SPEC RESP CULT: ABNORMAL
BUN BLD-MCNC: 24 MG/DL (ref 8–23)
BUN/CREAT SERPL: 22.2 (ref 7–25)
CALCIUM SPEC-SCNC: 8.7 MG/DL (ref 8.6–10.5)
CHLORIDE SERPL-SCNC: 100 MMOL/L (ref 98–107)
CO2 SERPL-SCNC: 24.8 MMOL/L (ref 22–29)
CREAT BLD-MCNC: 1.08 MG/DL (ref 0.76–1.27)
GFR SERPL CREATININE-BSD FRML MDRD: 67 ML/MIN/1.73
GLUCOSE BLD-MCNC: 112 MG/DL (ref 65–99)
GRAM STN SPEC: ABNORMAL
INR PPP: 2.23 (ref 0.9–1.1)
POTASSIUM BLD-SCNC: 4.3 MMOL/L (ref 3.5–5.2)
PROTHROMBIN TIME: 24.4 SECONDS (ref 11.7–14.2)
SODIUM BLD-SCNC: 136 MMOL/L (ref 136–145)

## 2020-02-17 PROCEDURE — 94799 UNLISTED PULMONARY SVC/PX: CPT

## 2020-02-17 PROCEDURE — 97530 THERAPEUTIC ACTIVITIES: CPT

## 2020-02-17 PROCEDURE — 85610 PROTHROMBIN TIME: CPT | Performed by: INTERNAL MEDICINE

## 2020-02-17 PROCEDURE — 80048 BASIC METABOLIC PNL TOTAL CA: CPT | Performed by: NURSE PRACTITIONER

## 2020-02-17 PROCEDURE — 97140 MANUAL THERAPY 1/> REGIONS: CPT

## 2020-02-17 PROCEDURE — 97116 GAIT TRAINING THERAPY: CPT

## 2020-02-17 RX ADMIN — PAROXETINE 40 MG: 20 TABLET, FILM COATED ORAL at 06:14

## 2020-02-17 RX ADMIN — SODIUM CHLORIDE, PRESERVATIVE FREE 10 ML: 5 INJECTION INTRAVENOUS at 21:22

## 2020-02-17 RX ADMIN — PRAMIPEXOLE DIHYDROCHLORIDE 0.5 MG: 0.5 TABLET ORAL at 21:22

## 2020-02-17 RX ADMIN — IPRATROPIUM BROMIDE AND ALBUTEROL SULFATE 3 ML: 2.5; .5 SOLUTION RESPIRATORY (INHALATION) at 11:47

## 2020-02-17 RX ADMIN — WARFARIN SODIUM 2.5 MG: 2.5 TABLET ORAL at 17:58

## 2020-02-17 RX ADMIN — FUROSEMIDE 20 MG: 20 TABLET ORAL at 08:06

## 2020-02-17 RX ADMIN — PANTOPRAZOLE SODIUM 40 MG: 40 TABLET, DELAYED RELEASE ORAL at 16:38

## 2020-02-17 RX ADMIN — PANTOPRAZOLE SODIUM 40 MG: 40 TABLET, DELAYED RELEASE ORAL at 08:06

## 2020-02-17 RX ADMIN — METOCLOPRAMIDE HYDROCHLORIDE 5 MG: 5 SOLUTION ORAL at 11:45

## 2020-02-17 RX ADMIN — PANTOPRAZOLE SODIUM 40 MG: 40 TABLET, DELAYED RELEASE ORAL at 06:14

## 2020-02-17 RX ADMIN — TAMSULOSIN HYDROCHLORIDE 0.4 MG: 0.4 CAPSULE ORAL at 21:22

## 2020-02-17 RX ADMIN — IPRATROPIUM BROMIDE AND ALBUTEROL SULFATE 3 ML: 2.5; .5 SOLUTION RESPIRATORY (INHALATION) at 20:01

## 2020-02-17 RX ADMIN — IPRATROPIUM BROMIDE AND ALBUTEROL SULFATE 3 ML: 2.5; .5 SOLUTION RESPIRATORY (INHALATION) at 04:50

## 2020-02-17 RX ADMIN — METOCLOPRAMIDE HYDROCHLORIDE 5 MG: 5 SOLUTION ORAL at 08:06

## 2020-02-17 RX ADMIN — METOCLOPRAMIDE HYDROCHLORIDE 5 MG: 5 SOLUTION ORAL at 06:14

## 2020-02-17 RX ADMIN — FERROUS SULFATE TAB 325 MG (65 MG ELEMENTAL FE) 325 MG: 325 (65 FE) TAB at 17:58

## 2020-02-17 RX ADMIN — GUAIFENESIN 1200 MG: 600 TABLET, EXTENDED RELEASE ORAL at 09:51

## 2020-02-17 RX ADMIN — GUAIFENESIN 1200 MG: 600 TABLET, EXTENDED RELEASE ORAL at 21:22

## 2020-02-17 RX ADMIN — TRAZODONE HYDROCHLORIDE 100 MG: 100 TABLET ORAL at 21:22

## 2020-02-17 RX ADMIN — FERROUS SULFATE TAB 325 MG (65 MG ELEMENTAL FE) 325 MG: 325 (65 FE) TAB at 08:06

## 2020-02-17 RX ADMIN — METOCLOPRAMIDE HYDROCHLORIDE 5 MG: 5 SOLUTION ORAL at 16:38

## 2020-02-17 RX ADMIN — AMOXICILLIN AND CLAVULANATE POTASSIUM 1 TABLET: 875; 125 TABLET, FILM COATED ORAL at 21:22

## 2020-02-17 RX ADMIN — AMOXICILLIN AND CLAVULANATE POTASSIUM 1 TABLET: 875; 125 TABLET, FILM COATED ORAL at 08:06

## 2020-02-17 RX ADMIN — PRAMIPEXOLE DIHYDROCHLORIDE 0.5 MG: 0.5 TABLET ORAL at 08:06

## 2020-02-17 RX ADMIN — POTASSIUM CHLORIDE 10 MEQ: 750 CAPSULE, EXTENDED RELEASE ORAL at 08:06

## 2020-02-17 NOTE — PROGRESS NOTES
Continued Stay Note  [facility]     Patient Name: Jose Hurtado  MRN: [id number]  Today's Date: 2/17/2020    Admit Date: 2/10/2020    Discharge Plan     Row Name 02/17/20 1514       Plan    Plan  [facility] - Pending pre-cert    Plan Comments  Outbound call to Alis to check status. No answer. Left VM. Gogo Ray RN    Row Name 02/17/20 1432       Plan    Plan  [facility]    Patient/Family in Agreement with Plan  yes    Plan Comments  [facility] is checking clinicals on patient to see if they can accept. CCP to follow. Gogo Ray RN    Row Name 02/17/20 1218       Plan    Plan Comments  Outbound call to Alis/Marysol Huntley. Alis to check on bed availability at [facility]. She is aware patient is ready to DC pending pre-cert. Gogo Ray RN        Discharge Codes    No documentation.       Expected Discharge Date and Time     Expected Discharge Date Expected Discharge Time    Feb 15, 2020             Gogo Ray RN

## 2020-02-17 NOTE — PLAN OF CARE
Problem: Patient Care Overview  Goal: Plan of Care Review  Outcome: Ongoing (interventions implemented as appropriate)  Flowsheets (Taken 2/17/2020 6907)  Outcome Summary: pt denies pain during shift. area on back and shoulder is red and blanchable.

## 2020-02-17 NOTE — PLAN OF CARE
Problem: Patient Care Overview  Goal: Plan of Care Review  Outcome: Ongoing (interventions implemented as appropriate)  Flowsheets  Taken 2/16/2020 0907 by Rebecca Graves PTA  Progress: declining  Taken 2/17/2020 1628 by Bailee Godfrey PT  Plan of Care Reviewed With: patient;spouse  Note:   Pt has had significant decline in functional mobility compared to initial evaluation. Pt was CGA for bed mobility, however maxA for initial sit<>stand transfer unable to achieve upright posture. Emphasized sit<>stand transfers with cues for anterior weight shifting, glute activation and optimum knee extension (unable to fully achieve). Performed 4-5 attempts with last episode able to perform with CGA.Pt ambulated ~50 ft with FWW limited due to fatigue and generalized weakness (crouched posture). Pt also has worsening cervical pain which he attributes to sleeping on it wrong last night. Less than 20 deg ROM in all  directions, painful  with palpation and all AROM. DC recs now include [facility].

## 2020-02-17 NOTE — PROGRESS NOTES
Continued Stay Note  [facility]     Patient Name: Jose Hurtado  MRN: [id number]  Today's Date: 2/17/2020    Admit Date: 2/10/2020    Discharge Plan     Row Name 02/17/20 1432       Plan    Plan  [facility]    Patient/Family in Agreement with Plan  yes    Plan Comments  [facility] is checking clinicals on patient to see if they can accept. CCP to follow. Gogo Ray RN    Row Name 02/17/20 1218       Plan    Plan Comments  [facility] Alis to check on bed availability at [facility]. She is aware patient is ready to DC pending pre-cert. Gogo Ray RN        Discharge Codes    No documentation.       Expected Discharge Date and Time     Expected Discharge Date Expected Discharge Time    Feb 15, 2020             Gogo Ray RN

## 2020-02-17 NOTE — THERAPY TREATMENT NOTE
Acute Care - Occupational Therapy Treatment Note  [facility]     Patient Name: Jose Hurtado  : 1950  MRN: [id number]  Today's Date: 2020             Admit Date: 2/10/2020       ICD-10-CM ICD-9-CM   1. Pneumonia of right lower lobe due to infectious organism (CMS/HCC) J18.1 486   2. Rigors R68.89 780.99     Patient Active Problem List   Diagnosis   • Adenocarcinoma of esophagus (CMS/HCC)   • Adenomatous polyp of colon   • Malignant neoplasm of prostate (CMS/HCC)   • RLS (restless legs syndrome)   • Depression   • Hypertension   • Anti-phospholipid syndrome (CMS/HCC)   • Anemia   • Insomnia due to other mental disorder   • Peripheral polyneuropathy   • B12 deficiency   • Postsurgical malabsorption   • Shortness of breath   • Long-term (current) use of anticoagulants, INR goal 2.0-3.0   • Lymphedema   • Pneumonia of right lower lobe due to infectious organism (CMS/HCC)   • Iron deficiency   • Elevated troponin   • Gastroparesis   • Neck pain     Past Medical History:   Diagnosis Date   • Anemia    • Anti-phospholipid syndrome (CMS/HCC)     On lifelong anticoagulation therapy   • Bladder disorder     LEAKAGE   ON MED  wers pads   • Community acquired pneumonia     HISTORY OF IN    • Deep venous thrombosis (CMS/HCC) ,     Left lower extremity multiple   • Depression    • Esophageal carcinoma (CMS/HCC) 2014    had chemo and radiation prior surgery   • Hemorrhoids    • HH (hiatus hernia)    • History of atrial fibrillation 2015    ONE EPISODE WHILE HOSPITALIZED   • History of kidney stones    • History of nephrolithiasis    • History of pancreatitis     PT STATES MANY YEARS AGO   • History of radiation therapy    • Hypertension    • Long-term (current) use of anticoagulants, INR goal 2.0-3.0    • Lymphedema    • Malignant neoplasm of prostate (CMS/HCC)    • Other hyperlipidemia 2018 lipid panel risk 12.8%   • Restless legs syndrome    • Shortness of breath    •  Squamous carcinoma     on the head     Past Surgical History:   Procedure Laterality Date   • APPENDECTOMY  1950   • BRONCHOSCOPY      (Diagnostic)   • CATARACT EXTRACTION Bilateral 2014   • COLONOSCOPY  12/15/2014    Complete / Description: EH, IH, torts, stool, follow-up colonoscopy due in 5 years.   • COLONOSCOPY N/A 6/13/2017    non-thrombosed external hemorrhoids, normal examined ileum, IH   • COLONOSCOPY N/A 2/26/2019    Procedure: COLONOSCOPY with Cold Polypectomy;  Surgeon: Mulugeta Millan MD;  Location: [facility] ENDOSCOPY;  Service: Gastroenterology   • CYSTOSCOPY W/ LASER LITHOTRIPSY     • ENDOSCOPY N/A 6/13/2017    Procedure: ESOPHAGOGASTRODUODENOSCOPY WITH COLD BIOPSY;  Surgeon: Lake Gonzalez MD;  Location: [facility] ENDOSCOPY;  Service:    • ESOPHAGECTOMY      April 2015, stage IIB esophageal carcinoma, sub-total resection.   • ESOPHAGECTOMY      Esophagectomy Subtotal Ethel Joe Procedure   • EXCISION LESION  08/2012    Removal of Squamous Cell CA on Head   • HAMMER TOE REPAIR  09/2014    Hammertoe Operation (Each Toe), 10/2014   • HAMMER TOE REPAIR Left 10/3/2017    Procedure: Left second third and fourth distal interphalangeal joint resection with flexor tenotomy;  Surgeon: Mulugeta Lira MD;  Location: [facility] OR OSC;  Service:    • HERNIA REPAIR      incisional   • JEJUNOSTOMY      Laparoscopic   • JEJUNOSTOMY      tube removal    • KNEE SURGERY Bilateral 1967, 1973, 1981   • PATELLA SURGERY Left     removed   • PILONIDAL CYST / SINUS EXCISION     • OH TOTAL KNEE ARTHROPLASTY Right 3/26/2018    Procedure: TOTAL KNEE ARTHROPLASTY;  Surgeon: Renny Solis MD;  Location: [facility] MAIN OR;  Service: Orthopedics   • PROSTATECTOMY  2010   • PYLOROPLASTY     • SPINAL FUSION  02/1998    C 5,6   • TONSILLECTOMY  1955   • TONSILLECTOMY     • UPPER GASTROINTESTINAL ENDOSCOPY  12/15/2014    LA Grade D esophagitis, Pardo's, HH, multiple duodenal ulcers   • VENTRAL/INCISIONAL HERNIA REPAIR N/A  "4/14/2016    Procedure: VENTRAL/INCISIONAL HERNIA REPAIR, open, with mesh, and component separation;  Surgeon: Darren Rivas MD;  Location: [facility];  Service:        Therapy Treatment    Rehabilitation Treatment Summary     Row Name 02/17/20 1133             Treatment Time/Intention    Discipline  occupational therapist  -VS      Document Type  therapy note (daily note)  -VS      Subjective Information  complains of;weakness;pain  -VS      Mode of Treatment  occupational therapy  -VS      Patient/Family Observations  Pt. was supine in bed with his wife at his side   -VS      Care Plan Review  evaluation/treatment results reviewed;care plan/treatment goals reviewed;risks/benefits reviewed;patient/other agree to care plan  -VS      Care Plan Review, Other Participant(s)  spouse  -VS      Patient Effort  adequate  -VS      Comment  Pt. required (A) for bed mobility and supine to sit.   -VS      Existing Precautions/Restrictions  fall  -VS      Patient Response to Treatment  \"I am not sure why I am so waek, and now my neck hurts all of the time\" pt. sitting with his neck fin forward flexion   -VS      Recorded by [VS] Florida Boyer OTR 02/17/20 1304      Row Name 02/17/20 1132             Cognitive Assessment/Intervention- PT/OT    Orientation Status (Cognition)  oriented x 4  -VS      Follows Commands (Cognition)  follows multi-step commands  -VS      Recorded by [VS] Florida Boyer OTR 02/17/20 1304      Row Name 02/17/20 1133             Bed Mobility Assessment/Treatment    Bed Mobility Assessment/Treatment  rolling right;supine-sit  -VS      Rolling Right [place] (Bed Mobility)  contact guard  -VS      Supine-Sit [place] (Bed Mobility)  minimum assist (75% patient effort)  -VS      Comment (Bed Mobility)  Pt. held on to the side rail to assist with sitting balance while on the EOB, this is new as of today.    -VS      Recorded by [VS] Florida Boyer OTR 02/17/20 1304      Row " Name 02/17/20 1133             Transfer Assessment/Treatment    Comment (Transfers)  Therapist did not have th pt. stand today  -VS      Recorded by [VS] Florida Boyer OTR 02/17/20 1304      Row Name 02/17/20 1133             Bathing Assessment/Intervention    Comment (Bathing)  Therapist bathed dried and applied lotiobn to both LE Toes to knees   -VS      Recorded by [VS] Florida Boyer OTR 02/17/20 1304      Row Name 02/17/20 1133             Lower Body Dressing Assessment/Training    Comment (Lower Body Dressing)  Therapist doffed and donned Non-slip socks   -VS      Recorded by [VS] Florida Boyer OTR 02/17/20 1304      Row Name 02/17/20 1133             Static Sitting Balance    Level of [place] (Unsupported Sitting, Static Balance)  -- Pt. used the side rail of the bed to (A) with sitting balanc  -VS      Recorded by [VS] Florida Boyer OTR 02/17/20 1304      Row Name 02/17/20 1133             Positioning and Restraints    Pre-Treatment Position  in bed  -VS      Post Treatment Position  bed  -VS      In Bed  notified nsg;sitting EOB;call light within reach;encouraged to call for assist;exit alarm on;with family/caregiver  -VS      Recorded by [VS] Florida Boyer OTR 02/17/20 1304      Row Name 02/17/20 1133             Pain Scale: Numbers Pre/Post-Treatment    Pain Scale: Numbers, Pretreatment  2/10  -VS      Pain Scale: Numbers, Post-Treatment  2/10  -VS      Pain Location - Side  other (see comments)  -VS      Pain Location  neck  -VS      Pain Intervention(s)  -- per RN OT made the pt. a warm compress for his nexk   -VS      Recorded by [VS] Florida Boyer OTR 02/17/20 1304      Row Name 02/17/20 1133             Edema Assessment & Management    Location (Edema)  lower extremity, left;lower extremity, right  -VS      Additional Documentation  Edema Symptoms/Interventions (Group)  -VS      Recorded by [VS] Florida Boyer OTR 02/17/20 1304      Row Name 02/17/20 1133              Lower Extremity Edema Measures, Left    Lower Extremity, Left (Edema)  mid-calf  -VS      Recorded by [VS] Florida Boyer, OTR 02/17/20 1304      Row Name 02/17/20 1133             Lower Extremity Edema Measures, Right    Lower Extremity, Right (Edema)  mid-calf  -VS      Recorded by [VS] Florida Boyer OTR 02/17/20 1304      Row Name 02/17/20 1133             Edema Symptoms/Interventions    Description (Edema)  localized  -VS      Pitting Scale (Edema)  0-->no pitting present (L) cintinues to be larger than (R), Old DVT in (L)LE   -VS      Treatment Interventions (Edema)  compression bandaging, short stretch;compression wrapping/taping #9stockinette,#15artiflex,#8 & 2x#10 bahdages Toe/kmee (B)   -VS      Recorded by [VS] Florida Boyer, OTR 02/17/20 1304      Row Name                Wound 01/14/20 2255 Right lower;posterior shoulder Pressure Injury    Wound - Properties Group Date first assessed: 01/14/20 [TS] Time first assessed: 2255 [TS] Present on Hospital Admission: Y [TS] Side: Right [TS] Orientation: lower;posterior [TS] Location: shoulder [TS] Primary Wound Type: Pressure inj [TS] Stage, Pressure Injury: Stage 1 [TS] Recorded by:  [TS] Guera Wynne RN 01/15/20 0622    Row Name                Wound 01/14/20 2255 midline thoracic spine Pressure Injury    Wound - Properties Group Date first assessed: 01/14/20 [TS] Time first assessed: 2255 [TS] Present on Hospital Admission: Y [TS] Orientation: midline [TS] Location: thoracic spine [TS] Primary Wound Type: Pressure inj [TS] Stage, Pressure Injury: Stage 1 [TS] Recorded by:  [TS] Guera Wynne RN 01/15/20 0620      User Key  (r) = Recorded By, (t) = Taken By, (c) = Cosigned By    Initials Name Effective Dates Discipline    VS Florida Boyer, OTR 06/08/18 -  OT    TS Guera Wynne RN 07/17/19 -  Nurse        Wound 01/14/20 2255 midline thoracic spine Pressure Injury (Active)   Dressing Appearance no drainage;open to  air;intact;dry 2/17/2020 12:00 PM   Closure Open to air 2/17/2020 12:00 PM   Base blanchable 2/17/2020 12:00 PM   Periwound intact;dry 2/17/2020 12:00 PM   Periwound Temperature warm 2/17/2020 12:00 PM   Periwound Skin Turgor soft 2/17/2020 12:00 PM   Drainage Amount none 2/17/2020 12:00 PM       Wound 01/14/20 2255 Right lower;posterior shoulder Pressure Injury (Active)   Dressing Appearance dry;intact 2/17/2020 12:00 PM   Base blanchable 2/17/2020 12:00 PM   Periwound intact;pink 2/17/2020 12:00 PM   Periwound Temperature warm 2/17/2020 12:00 PM   Periwound Skin Turgor soft 2/17/2020 12:00 PM   Drainage Amount none 2/17/2020 12:00 PM           OT Recommendation and Plan  Outcome Summary/Treatment Plan (OT)  Anticipated Discharge Disposition (OT): extended care facility, inpatient rehabilitation facility  Planned Therapy Interventions (OT Eval): edema control/reduction  Therapy Frequency (OT Eval): 5 times/wk  Plan of Care Review  Plan of Care Reviewed With: patient, spouse  Plan of Care Reviewed With: patient, spouse  Outcome Measures     Row Name 02/17/20 1300 02/14/20 1600          How much help from another is currently needed...    Putting on and taking off regular lower body clothing?  3  -VS  4  -VS     Bathing (including washing, rinsing, and drying)  3  -VS  4  -VS     Toileting (which includes using toilet bed pan or urinal)  3  -VS  3  -VS     Putting on and taking off regular upper body clothing  3  -VS  4  -VS     Taking care of personal grooming (such as brushing teeth)  4  -VS  4  -VS     Eating meals  4  -VS  4  -VS     AM-PAC 6 Clicks Score (OT)  20  -VS  23  -VS        Functional Assessment    Outcome Measure Options  AM-PAC 6 Clicks Daily Activity (OT)  -VS  AM-PAC 6 Clicks Daily Activity (OT)  -VS       User Key  (r) = Recorded By, (t) = Taken By, (c) = Cosigned By    Initials Name Provider Type    VS Florida Boyer OTR Occupational Therapist           Time Calculation:   Time Calculation- OT      Row Name 02/17/20 1311             Time Calculation- OT    OT Start Time  1050  -VS      OT Stop Time  1130  -VS      OT Time Calculation (min)  40 min  -VS      Total Timed Code Minutes- OT  40 minute(s)  -VS      OT Received On  02/17/20  -VS        User Key  (r) = Recorded By, (t) = Taken By, (c) = Cosigned By    Initials Name Provider Type    VS Florida Boyer OTR Occupational Therapist        Therapy Charges for Today     Code Description Service Date Service Provider Modifiers Qty    [id number] HC OT MANUAL THERAPY EA 15 MIN 2/17/2020 Florida Boyer OTR GO 3               BLUE [place]  2/17/2020

## 2020-02-17 NOTE — DISCHARGE PLACEMENT REQUEST
"Jose Bourgeois (70 y.o. Male)     Date of Birth Social Security Number Address Home Phone MRN    1950  [address] [id number] [phone] [id number]    Yarsani Marital Status          Congregation        Admission Date Admission Type Admitting Provider Attending Provider Department, Room/Bed    2/10/20 Emergency Fiona Barba MD Lykins, Matthew D, MD [address], [address]    Discharge Date Discharge Disposition Discharge Destination                       Attending Provider:  Albert Del Angel MD    Allergies:  No Known Allergies    Isolation:  None   Infection:  None   Code Status:  No CPR    Ht:  195.6 cm (77\")   Wt:  86.3 kg (190 lb 4.8 oz)    Admission Cmt:  None   Principal Problem:  None                Active Insurance as of 2/10/2020     Primary Coverage     Payor Plan Insurance Group Employer/Plan Group    HUMANA MEDICARE REPLACEMENT HUMANA MEDICARE REPLACEMENT [id number]     Payor Plan Address Payor Plan Phone Number Payor Plan Fax Number Effective Dates    [address] [address] [phone]  1/1/2018 - None Entered    [facility] [id number]       Subscriber Name Subscriber Birth Date Member ID       JOSE BOURGEOIS 1950 [id number]                 Emergency Contacts      (Rel.) Home Phone Work Phone Mobile Phone    Lena Alvarado (Spouse) [phone] -- [phone]              "

## 2020-02-17 NOTE — PLAN OF CARE
Problem: Patient Care Overview  Goal: Plan of Care Review  Flowsheets (Taken 2/17/2020 2204)  Plan of Care Reviewed With: patient; spouse  Note:   OT Lymph:  PT. Continues to tolerate the LE wraps from his toes to his knees (B)ly.  Currently the pt. Has no edema present in either leg but continue to need to Lymph wraps for external support.  Pt's wife and pt. Both know how to wrap the LEs because the pt. Has used the wraps at home before.  OT will continue to wraps (B)LE while the pt. Is an in patient.  Pt's mobility skills were decreased today, pt. Required CGA to roll to the (R) and Min (A) for supine to sit.  Pt. States he dopes not know why he is weaker at this time.  Pt's plans on going to a Sub Acute Rehab to continue to receive therapy to regain his (I).

## 2020-02-17 NOTE — PROGRESS NOTES
Continued Stay Note  [facility]     Patient Name: Jose Hurtado  MRN: [id number]  Today's Date: 2/17/2020    Admit Date: 2/10/2020    Discharge Plan     Row Name 02/17/20 0911       Plan    Plan  [facility] - pre-cert pending    Patient/Family in Agreement with Plan  yes    Plan Comments  Outbound call to spouse to discussed DC planning. Spouse would like a refferal sent to [facility] as it is right next door to their home. Referral placed in [facility]. Patient will need insurance pre-cert. Message left for [facility]. Gogo Meier, WAQAS        Discharge Codes    No documentation.       Expected Discharge Date and Time     Expected Discharge Date Expected Discharge Time    Feb 15, 2020             Gogo Ray RN

## 2020-02-17 NOTE — PROGRESS NOTES
Continued Stay Note  [facility]     Patient Name: Jose Hurtado  MRN: [id number]  Today's Date: 2/17/2020    Admit Date: 2/10/2020    Discharge Plan     Row Name 02/17/20 1218       Plan    Plan Comments  Outbound call to [facility]. Alis to check on bed availability at [facility]. She is aware patient is ready to DC pending pre-cert. Gogo Ray RN        Discharge Codes    No documentation.       Expected Discharge Date and Time     Expected Discharge Date Expected Discharge Time    Feb 15, 2020             Gogo Ray RN

## 2020-02-17 NOTE — PROGRESS NOTES
Progress Note    Name: Jose Hurtado ADMIT: 2/10/2020   : 1950  PCP: Brandin Bolton MD    MRN: [id number] LOS: 7 days   AGE/SEX: 70 y.o. male  ROOM: 30/   Date of Encounter Visit: 20    Subjective:     Interval History:awaiting rehab placement. Ambulated with PT yesterday. Labs reviewed.     REVIEW OF SYSTEMS:   no fever or chills.   No chest pain, palpitations. Edema overall improved.    No shortness of breath. Non productive cough.   No nausea, vomiting or diarrhea. BM 2 days ago  L knee pain. Restless legs a little better last night.   Reports neck pain and was bending neck forward in awkward position.     Objective:   Temp:  [98.6 °F (37 °C)-99.2 °F (37.3 °C)] 98.6 °F (37 °C)  Heart Rate:  [75-95] 82  Resp:  [16-18] 16  BP: (119-141)/(58-71) 126/68   SpO2:  [94 %-97 %] 96 %  on    Device (Oxygen Therapy): room air    Intake/Output Summary (Last 24 hours) at 2020 1045  Last data filed at 2020 0506  Gross per 24 hour   Intake 265 ml   Output 1600 ml   Net -1335 ml     Body mass index is 22.57 kg/m².      02/15/20  0613 20  0540 20  0530   Weight: 84.3 kg (185 lb 12.8 oz) 83.1 kg (183 lb 1.6 oz) 86.3 kg (190 lb 4.8 oz)     Weight change: 3.266 kg (7 lb 3.2 oz)    Physical Exam   Constitutional: No distress.   HENT:   Head: Atraumatic.   Nose: Nose normal.   Eyes: Conjunctivae are normal.   Neck:   Stiff neck with tenderness. Good ROM   Cardiovascular: Normal rate and regular rhythm.   No murmur heard.  Pulmonary/Chest: Effort normal. He has no wheezes. He has no rales.   Diminished bases   Abdominal: Soft. Bowel sounds are normal. There is no tenderness.   Genitourinary:   Genitourinary Comments: King present with yellow urine   Musculoskeletal: He exhibits edema (2-3+ BLE with bilateral wraps).   Decreased ROM of left knee and mild edema   Neurological: He is alert.   Skin: Skin is warm and dry. He is not diaphoretic.       Results Review:      Results from last 7 days   Lab  Units 02/17/20  0637 02/16/20  0633 02/13/20  0533 02/12/20  0544 02/11/20  0439 02/10/20  1251   SODIUM mmol/L 136 133* 138 138 139 141   POTASSIUM mmol/L 4.3 4.4 4.1 4.1 4.1 4.8   CHLORIDE mmol/L 100 98 103 103 104 104   CO2 mmol/L 24.8 23.0 24.5 24.6 25.5 26.0   BUN mg/dL 24* 26* 14 14 20 22   CREATININE mg/dL 1.08 1.29* 0.93 1.00 1.05 1.23   GLUCOSE mg/dL 112* 116* 89 89 117* 98   CALCIUM mg/dL 8.7 8.6 8.1* 8.4* 8.3* 9.0   AST (SGOT) U/L  --   --   --   --   --  36   ALT (SGPT) U/L  --   --   --   --   --  23     Estimated Creatinine Clearance: 75.5 mL/min (by C-G formula based on SCr of 1.08 mg/dL).          Results from last 7 days   Lab Units 02/11/20  1149 02/11/20  0439   TROPONIN T ng/mL 0.039* 0.046*             Results from last 7 days   Lab Units 02/11/20  0439   MAGNESIUM mg/dL 2.0           Invalid input(s): LDLCALC  Results from last 7 days   Lab Units 02/16/20  0633 02/15/20  0706 02/13/20  0533 02/12/20  0544 02/11/20  0439 02/10/20  1251   WBC 10*3/mm3 5.90  --  4.94 5.43 7.21 4.71   HEMOGLOBIN g/dL 9.0* 9.3* 8.1* 7.8* 7.8* 10.1*   HEMATOCRIT % 27.9* 28.4* 24.9* 23.6* 23.6* 31.2*   PLATELETS 10*3/mm3 205  --  159 161 155 198   MCV fL 88.6  --  87.7 87.4 88.4 89.9   MCH pg 28.6  --  28.5 28.9 29.2 29.1   MCHC g/dL 32.3  --  32.5 33.1 33.1 32.4   RDW % 14.0  --  14.1 14.2 14.1 14.1   RDW-SD fl 44.0  --  44.6 45.2 44.6 46.3   MPV fL 9.3  --  9.2 9.4 9.3 8.9   NEUTROPHIL % % 74.2  --  73.9 73.4 76.8* 80.9*   LYMPHOCYTE % % 12.0*  --  11.7* 13.8* 15.7* 12.3*   MONOCYTES % % 10.3  --  8.1 7.0 4.6* 4.9*   EOSINOPHIL % % 2.7  --  5.7 5.0 2.5 1.5   BASOPHIL % % 0.5  --  0.2 0.6 0.1 0.2   IMM GRAN % % 0.3  --  0.4 0.2 0.3 0.2   NEUTROS ABS 10*3/mm3 4.37  --  3.65 3.99 5.54 3.81   LYMPHS ABS 10*3/mm3 0.71  --  0.58* 0.75 1.13 0.58*   MONOS ABS 10*3/mm3 0.61  --  0.40 0.38 0.33 0.23   EOS ABS 10*3/mm3 0.16  --  0.28 0.27 0.18 0.07   BASOS ABS 10*3/mm3 0.03  --  0.01 0.03 0.01 0.01   IMMATURE GRANS (ABS)  10*3/mm3 0.02  --  0.02 0.01 0.02 0.01   NRBC /100 WBC 0.0  --  0.0 0.0 0.0 0.0     Results from last 7 days   Lab Units 02/17/20  0637 02/16/20  0633 02/15/20  0706 02/14/20  0529 02/13/20  0533 02/12/20  0543 02/11/20  0439   INR  2.23* 2.32* 2.32* 1.99* 2.05* 2.93* 4.22*         Results from last 7 days   Lab Units 02/11/20  0028 02/10/20  1251   PROCALCITONIN ng/mL 1.37*  --    LACTATE mmol/L  --  1.0             Results from last 7 days   Lab Units 02/15/20  0245 02/10/20  1531 02/10/20  1251   BLOODCX   --  No growth at 5 days No growth at 5 days   RESPCX  Light growth (2+) Candida albicans*  Scant growth (1+) Normal Respiratory Lanie  --   --      Results from last 7 days   Lab Units 02/10/20  1300   ADENOVIRUS DETECTION BY PCR  Not Detected   CORONAVIRUS 229E  Not Detected   CORONAVIRUS HKU1  Not Detected   CORONAVIRUS NL63  Not Detected   CORONAVIRUS OC43  Not Detected   HUMAN METAPNEUMOVIRUS  Not Detected   HUMAN RHINOVIRUS/ENTEROVIRUS  Not Detected   INFLUENZA B PCR  Not Detected   PARAINFLUENZA 1  Not Detected   PARAINFLUENZA VIRUS 2  Not Detected   PARAINFLUENZA VIRUS 3  Not Detected   PARAINFLUENZA VIRUS 4  Not Detected   BORDETELLA PERTUSSIS PCR  Not Detected   CHLAMYDOPHILA PNEUMONIAE PCR  Not Detected   MYCOPLAMA PNEUMO PCR  Not Detected   INFLUENZA A PCR  Not Detected   INFLUENZA A H3  Not Detected   INFLUENZA A H1  Not Detected   RSV, PCR  Not Detected     Results from last 7 days   Lab Units 02/10/20  1352   NITRITE UA  Negative   WBC UA /HPF 6-12*   BACTERIA UA /HPF None Seen   SQUAM EPITHEL UA /HPF 0-2     Results from last 7 days   Lab Units 02/16/20  0633   URIC ACID mg/dL 5.4       Imaging:  Imaging Results (Last 24 Hours)     ** No results found for the last 24 hours. **          I reviewed the patient's new clinical results and medications.         Medication Review:   Scheduled Meds:    amoxicillin-clavulanate 1 tablet Oral Q12H   ferrous sulfate 325 mg Oral BID With Meals   furosemide  20 mg Oral Daily   guaiFENesin 1,200 mg Oral Q12H   ipratropium-albuterol 3 mL Nebulization 4x Daily - RT   metoclopramide 5 mg Oral 4x Daily AC & at Bedtime   pantoprazole 40 mg Oral BID AC   PARoxetine 40 mg Oral QAM   potassium chloride 10 mEq Oral Daily   pramipexole 0.5 mg Oral BID   tamsulosin 0.4 mg Oral Nightly   traZODone 100 mg Oral Nightly   warfarin 2.5 mg Oral Daily     Continuous Infusions:    Pharmacy to dose warfarin      PRN Meds:.•  acetaminophen **OR** acetaminophen **OR** acetaminophen  •  albuterol  •  albuterol  •  bisacodyl  •  calcium carbonate  •  docusate sodium  •  melatonin  •  muscle rub  •  nitroglycerin  •  ondansetron **OR** ondansetron  •  Pharmacy to dose warfarin  •  sodium chloride    Problem List:     Active Hospital Problems    Diagnosis  POA   • Gastroparesis [K31.84]  Yes   • Elevated troponin [R79.89]  Yes   • Pneumonia of right lower lobe due to infectious organism (CMS/HCC) [J18.1]  Yes   • Iron deficiency [E61.1]  Yes   • Lymphedema [I89.0]  Yes   • Long-term (current) use of anticoagulants, INR goal 2.0-3.0 [Z79.01]  Not Applicable   • Postsurgical malabsorption [K91.2]  Yes   • Peripheral polyneuropathy [G62.9]  Yes   • Anemia [D64.9]  Yes   • Anti-phospholipid syndrome (CMS/HCC) [D68.61]  Yes   • Hypertension [I10]  Yes   • RLS (restless legs syndrome) [G25.81]  Yes      Resolved Hospital Problems   No resolved problems to display.       Assessment and Plan:     The patient is a 70 y.o. male with history of antiphospholipid syndrome, afib on coumadin and esophageal cancer s/p esophagectomy in 2015 who presented with cough and shortness of breath. He was found to have RLL pneumonia.     LLE weakness  -weakness and pain may be due to underlying OA and decreased mobility  -continue to work with PT and plan to go to rehab.   -encouraged follow up with orthopedic as outpatient    Iron deficiency anemia  -recent iron studies on 2/11/20 were low.   - iron supplement increased  to BID this admit    RLL pneumonia/ pleural effusion  -possible aspiration component  -continue planned course of Augmentin  -follow up with pulmonary in 3 weeks    Gastroparesis  -delayed gastric emptying. Symptoms improved on Reglan, PPI and low residue diet.   -apprec GI input    Afib/ chronic anticoagulation  -INR therapeutic at 2.23  - continue coumadin at same dose    Urinary retention  -lama present on admit. Will continue  - follow up with urology as outpatient    Neck pain  -likely from poor posture. Encouraged stretches and added support     VTE prophylaxis: Warfarin (home med)  CODE status: DNR  Disposition: TBD- possible dc tomorrow once precert approved and bed available at rehab.    I discussed the patients findings and my recommendations with patient and nursing staff.    Terri Hutchinson, FAYE  02/17/20  10:44 AM

## 2020-02-17 NOTE — THERAPY TREATMENT NOTE
Patient Name: Jose Hurtado  : 1950    MRN: [id number]                              Today's Date: 2020       Admit Date: 2/10/2020    Visit Dx:     ICD-10-CM ICD-9-CM   1. Pneumonia of right lower lobe due to infectious organism (CMS/HCC) J18.1 486   2. Rigors R68.89 780.99     Patient Active Problem List   Diagnosis   • Adenocarcinoma of esophagus (CMS/HCC)   • Adenomatous polyp of colon   • Malignant neoplasm of prostate (CMS/HCC)   • RLS (restless legs syndrome)   • Depression   • Hypertension   • Anti-phospholipid syndrome (CMS/HCC)   • Anemia   • Insomnia due to other mental disorder   • Peripheral polyneuropathy   • B12 deficiency   • Postsurgical malabsorption   • Shortness of breath   • Long-term (current) use of anticoagulants, INR goal 2.0-3.0   • Lymphedema   • Pneumonia of right lower lobe due to infectious organism (CMS/HCC)   • Iron deficiency   • Elevated troponin   • Gastroparesis   • Neck pain     Past Medical History:   Diagnosis Date   • Anemia    • Anti-phospholipid syndrome (CMS/HCC)     On lifelong anticoagulation therapy   • Bladder disorder     LEAKAGE   ON MED  wers pads   • Community acquired pneumonia     HISTORY OF IN    • Deep venous thrombosis (CMS/HCC) ,     Left lower extremity multiple   • Depression    • Esophageal carcinoma (CMS/HCC) 2014    had chemo and radiation prior surgery   • Hemorrhoids    • HH (hiatus hernia)    • History of atrial fibrillation 2015    ONE EPISODE WHILE HOSPITALIZED   • History of kidney stones    • History of nephrolithiasis    • History of pancreatitis     PT STATES MANY YEARS AGO   • History of radiation therapy    • Hypertension    • Long-term (current) use of anticoagulants, INR goal 2.0-3.0    • Lymphedema    • Malignant neoplasm of prostate (CMS/HCC)    • Other hyperlipidemia 2018 lipid panel risk 12.8%   • Restless legs syndrome    • Shortness of breath    • Squamous carcinoma     on the head      Past Surgical History:   Procedure Laterality Date   • APPENDECTOMY  1950   • BRONCHOSCOPY      (Diagnostic)   • CATARACT EXTRACTION Bilateral 2014   • COLONOSCOPY  12/15/2014    Complete / Description: EH, IH, torts, stool, follow-up colonoscopy due in 5 years.   • COLONOSCOPY N/A 6/13/2017    non-thrombosed external hemorrhoids, normal examined ileum, IH   • COLONOSCOPY N/A 2/26/2019    Procedure: COLONOSCOPY with Cold Polypectomy;  Surgeon: Mulugeta Millan MD;  Location: [facility] ENDOSCOPY;  Service: Gastroenterology   • CYSTOSCOPY W/ LASER LITHOTRIPSY     • ENDOSCOPY N/A 6/13/2017    Procedure: ESOPHAGOGASTRODUODENOSCOPY WITH COLD BIOPSY;  Surgeon: Lake Gonzalez MD;  Location: [facility] ENDOSCOPY;  Service:    • ESOPHAGECTOMY      April 2015, stage IIB esophageal carcinoma, sub-total resection.   • ESOPHAGECTOMY      Esophagectomy Subtotal Andrea Joe Procedure   • EXCISION LESION  08/2012    Removal of Squamous Cell CA on Head   • HAMMER TOE REPAIR  09/2014    Hammertoe Operation (Each Toe), 10/2014   • HAMMER TOE REPAIR Left 10/3/2017    Procedure: Left second third and fourth distal interphalangeal joint resection with flexor tenotomy;  Surgeon: Mulugeta Lira MD;  Location: [facility] OR OSC;  Service:    • HERNIA REPAIR      incisional   • JEJUNOSTOMY      Laparoscopic   • JEJUNOSTOMY      tube removal    • KNEE SURGERY Bilateral 1967, 1973, 1981   • PATELLA SURGERY Left     removed   • PILONIDAL CYST / SINUS EXCISION     • WA TOTAL KNEE ARTHROPLASTY Right 3/26/2018    Procedure: TOTAL KNEE ARTHROPLASTY;  Surgeon: Renny Solis MD;  Location: [facility] MAIN OR;  Service: Orthopedics   • PROSTATECTOMY  2010   • PYLOROPLASTY     • SPINAL FUSION  02/1998    C 5,6   • TONSILLECTOMY  1955   • TONSILLECTOMY     • UPPER GASTROINTESTINAL ENDOSCOPY  12/15/2014    LA Grade D esophagitis, Pardo's, HH, multiple duodenal ulcers   • VENTRAL/INCISIONAL HERNIA REPAIR N/A 4/14/2016    Procedure:  VENTRAL/INCISIONAL HERNIA REPAIR, open, with mesh, and component separation;  Surgeon: Darren Rivas MD;  Location: [facility] MAIN OR;  Service:      General Information     Row Name 02/17/20 1625          PT Evaluation Time/Intention    Document Type  therapy note (daily note)  -AE     Mode of Treatment  physical therapy  -AE     Row Name 02/17/20 1625          General Information    Patient Profile Reviewed?  yes  -AE       User Key  (r) = Recorded By, (t) = Taken By, (c) = Cosigned By    Initials Name Provider Type    AE Bailee Godfrey PT Physical Therapist        Mobility     Row Name 02/17/20 1625          Bed Mobility Assessment/Treatment    Supine-Sit [place] (Bed Mobility)  contact guard  -AE     Assistive Device (Bed Mobility)  bed rails;head of bed elevated  -AE     Row Name 02/17/20 1625          Transfer Assessment/Treatment    Comment (Transfers)  multiple sit<>stands improved from maxA->CGA  -AE     Row Name 02/17/20 1625          Sit-Stand Transfer    Sit-Stand [place] (Transfers)  maximum assist (25% patient effort);1 person assist  -AE     Assistive Device (Sit-Stand Transfers)  walker, front-wheeled  -AE     Row Name 02/17/20 1625          Gait/Stairs Assessment/Training    Gait/Stairs Assessment/Training  gait/ambulation independence;gait/ambulation assistive device  -AE     [place] Level (Gait)  minimum assist (75% patient effort);1 person assist  -AE     Assistive Device (Gait)  walker, front-wheeled  -AE     Distance in Feet (Gait)  50 ft  -AE     Pattern (Gait)  step-through  -AE     Deviations/Abnormal Patterns (Gait)  jf decreased;festinating/shuffling;steppage  -AE     Bilateral Gait Deviations  forward flexed posture;knee buckling, bilateral  -AE     Left Sided Gait Deviations  forward flexed posture  -AE       User Key  (r) = Recorded By, (t) = Taken By, (c) = Cosigned By    Initials Name Provider Type    AE Bailee Godfrey PT Physical Therapist         Obj/Interventions     Row Name 02/17/20 1628          General ROM    GENERAL ROM COMMENTS  cervical ROM severely limited <15deg ROM in all directions  -AE     Row Name 02/17/20 1628          Therapeutic Exercise    Comment (Therapeutic Exercise)  gentle cervical AROM and sitting LAQs  -AE       User Key  (r) = Recorded By, (t) = Taken By, (c) = Cosigned By    Initials Name Provider Type    AE Bailee Godfrey PT Physical Therapist        Goals/Plan    No documentation.       Clinical Impression     [facility] Name 02/17/20 1628          Pain Assessment    Additional Documentation  Pain Scale: Numbers Pre/Post-Treatment (Group)  -AE     [facility] Name 02/17/20 1628          Pain Scale: Numbers Pre/Post-Treatment    Pain Scale: Numbers, Pretreatment  2/10  -AE     Pain Scale: Numbers, Post-Treatment  4/10  -AE     Pain Location  neck  -AE     Pre/Post Treatment Pain Comment  pt c/o severe neck pain with palpation and AROM  -AE     Pain Intervention(s)  Repositioned;Ambulation/increased activity;Rest  -AE     Row Name 02/17/20 1628          Plan of Care Review    Plan of Care Reviewed With  patient;spouse  -AE     [facility] Name 02/17/20 1628          Positioning and Restraints    Pre-Treatment Position  in bed  -AE     Post Treatment Position  bed  -AE     In Bed  supine;call light within reach;encouraged to call for assist;exit alarm on  -AE       User Key  (r) = Recorded By, (t) = Taken By, (c) = Cosigned By    Initials Name Provider Type    AE Bailee Godfrey PT Physical Therapist        Outcome Measures     Row Name 02/17/20 1630          How much help from another person do you currently need...    Turning from your back to your side while in flat bed without using bedrails?  3  -AE     Moving from lying on back to sitting on the side of a flat bed without bedrails?  3  -AE     Moving to and from a bed to a chair (including a wheelchair)?  2  -AE     Standing up from a chair using your arms (e.g., wheelchair, bedside  chair)?  2  -AE     Climbing 3-5 steps with a railing?  1  -AE     To walk in hospital room?  3  -AE     AM-PAC 6 Clicks Score (PT)  14  -AE     Row Name 02/17/20 1630          Functional Assessment    Outcome Measure Options  AM-PAC 6 Clicks Basic Mobility (PT)  -AE       User Key  (r) = Recorded By, (t) = Taken By, (c) = Cosigned By    Initials Name Provider Type    Bailee Chaudhary, MANDA Physical Therapist          PT Recommendation and Plan  Planned Therapy Interventions (PT Eval): balance training, bed mobility training, gait training, home exercise program, patient/family education, neuromuscular re-education, motor coordination training, postural re-education, ROM (range of motion), stair training, strengthening, stretching, transfer training  Outcome Summary/Treatment Plan (PT)  Anticipated Discharge Disposition (PT): skilled nursing facility  Plan of Care Reviewed With: patient, spouse     Time Calculation:   PT Charges     Row Name 02/17/20 1633 02/17/20 0628          Time Calculation    Start Time  1600  -AE  --     Stop Time  1630  -AE  --     Time Calculation (min)  30 min  -AE  --     PT Received On  02/17/20  -AE  --     PT - Next Appointment  02/18/20  -AE  02/18/20  -ARYRAY     PT Goal Re-Cert Due Date  02/24/20  -AE  --        Time Calculation- PT    Total Timed Code Minutes- PT  30 minute(s)  -AE  --       User Key  (r) = Recorded By, (t) = Taken By, (c) = Cosigned By    Initials Name Provider Type    Jannette Garcia, PT Physical Therapist    AE Bailee Godfrey, MANDA Physical Therapist        Therapy Charges for Today     Code Description Service Date Service Provider Modifiers Qty    11863309888 HC PT THERAPEUTIC ACT EA 15 MIN 2/17/2020 Bailee Godfrey, PT GP 1    75990616578 HC GAIT TRAINING EA 15 MIN 2/17/2020 Bailee Godfrey, PT GP 1          PT G-Codes  Outcome Measure Options: AM-PAC 6 Clicks Basic Mobility (PT)  AM-PAC 6 Clicks Score (PT): 14  AM-PAC 6 Clicks Score (OT):  20    Bailee Godfrey, PT  2/17/2020

## 2020-02-18 PROBLEM — K59.00 CONSTIPATION: Status: ACTIVE | Noted: 2020-02-18

## 2020-02-18 LAB
INR PPP: 2.09 (ref 0.9–1.1)
PROTHROMBIN TIME: 23.2 SECONDS (ref 11.7–14.2)

## 2020-02-18 PROCEDURE — 85610 PROTHROMBIN TIME: CPT | Performed by: INTERNAL MEDICINE

## 2020-02-18 PROCEDURE — 94799 UNLISTED PULMONARY SVC/PX: CPT

## 2020-02-18 PROCEDURE — 97140 MANUAL THERAPY 1/> REGIONS: CPT

## 2020-02-18 PROCEDURE — 97116 GAIT TRAINING THERAPY: CPT

## 2020-02-18 RX ORDER — METHOCARBAMOL 500 MG/1
500 TABLET, FILM COATED ORAL EVERY 8 HOURS PRN
Status: DISCONTINUED | OUTPATIENT
Start: 2020-02-18 | End: 2020-02-19 | Stop reason: HOSPADM

## 2020-02-18 RX ORDER — COLCHICINE 0.6 MG/1
0.6 TABLET ORAL ONCE
Status: COMPLETED | OUTPATIENT
Start: 2020-02-18 | End: 2020-02-18

## 2020-02-18 RX ORDER — DOCUSATE SODIUM 100 MG/1
100 CAPSULE, LIQUID FILLED ORAL 2 TIMES DAILY
Status: DISCONTINUED | OUTPATIENT
Start: 2020-02-18 | End: 2020-02-19 | Stop reason: HOSPADM

## 2020-02-18 RX ORDER — IPRATROPIUM BROMIDE AND ALBUTEROL SULFATE 2.5; .5 MG/3ML; MG/3ML
3 SOLUTION RESPIRATORY (INHALATION) EVERY 6 HOURS PRN
Status: DISCONTINUED | OUTPATIENT
Start: 2020-02-18 | End: 2020-02-19 | Stop reason: HOSPADM

## 2020-02-18 RX ADMIN — IPRATROPIUM BROMIDE AND ALBUTEROL SULFATE 3 ML: 2.5; .5 SOLUTION RESPIRATORY (INHALATION) at 08:54

## 2020-02-18 RX ADMIN — POTASSIUM CHLORIDE 10 MEQ: 750 CAPSULE, EXTENDED RELEASE ORAL at 09:24

## 2020-02-18 RX ADMIN — TRAZODONE HYDROCHLORIDE 100 MG: 100 TABLET ORAL at 21:01

## 2020-02-18 RX ADMIN — METOCLOPRAMIDE HYDROCHLORIDE 5 MG: 5 SOLUTION ORAL at 09:24

## 2020-02-18 RX ADMIN — METOCLOPRAMIDE HYDROCHLORIDE 5 MG: 5 SOLUTION ORAL at 21:00

## 2020-02-18 RX ADMIN — FERROUS SULFATE TAB 325 MG (65 MG ELEMENTAL FE) 325 MG: 325 (65 FE) TAB at 17:26

## 2020-02-18 RX ADMIN — AMOXICILLIN AND CLAVULANATE POTASSIUM 1 TABLET: 875; 125 TABLET, FILM COATED ORAL at 21:01

## 2020-02-18 RX ADMIN — DOCUSATE SODIUM 100 MG: 100 CAPSULE, LIQUID FILLED ORAL at 21:02

## 2020-02-18 RX ADMIN — AMOXICILLIN AND CLAVULANATE POTASSIUM 1 TABLET: 875; 125 TABLET, FILM COATED ORAL at 09:24

## 2020-02-18 RX ADMIN — GUAIFENESIN 1200 MG: 600 TABLET, EXTENDED RELEASE ORAL at 21:01

## 2020-02-18 RX ADMIN — PRAMIPEXOLE DIHYDROCHLORIDE 0.5 MG: 0.5 TABLET ORAL at 09:24

## 2020-02-18 RX ADMIN — IPRATROPIUM BROMIDE AND ALBUTEROL SULFATE 3 ML: 2.5; .5 SOLUTION RESPIRATORY (INHALATION) at 11:40

## 2020-02-18 RX ADMIN — PAROXETINE 40 MG: 20 TABLET, FILM COATED ORAL at 06:19

## 2020-02-18 RX ADMIN — FUROSEMIDE 20 MG: 20 TABLET ORAL at 09:24

## 2020-02-18 RX ADMIN — PRAMIPEXOLE DIHYDROCHLORIDE 0.5 MG: 0.5 TABLET ORAL at 21:01

## 2020-02-18 RX ADMIN — TAMSULOSIN HYDROCHLORIDE 0.4 MG: 0.4 CAPSULE ORAL at 21:01

## 2020-02-18 RX ADMIN — FERROUS SULFATE TAB 325 MG (65 MG ELEMENTAL FE) 325 MG: 325 (65 FE) TAB at 09:24

## 2020-02-18 RX ADMIN — COLCHICINE 0.6 MG: 0.6 TABLET, FILM COATED ORAL at 16:00

## 2020-02-18 RX ADMIN — GUAIFENESIN 1200 MG: 600 TABLET, EXTENDED RELEASE ORAL at 09:24

## 2020-02-18 RX ADMIN — PANTOPRAZOLE SODIUM 40 MG: 40 TABLET, DELAYED RELEASE ORAL at 09:24

## 2020-02-18 RX ADMIN — METOCLOPRAMIDE HYDROCHLORIDE 5 MG: 5 SOLUTION ORAL at 11:45

## 2020-02-18 RX ADMIN — WARFARIN SODIUM 3.5 MG: 2.5 TABLET ORAL at 17:27

## 2020-02-18 RX ADMIN — METOCLOPRAMIDE HYDROCHLORIDE 5 MG: 5 SOLUTION ORAL at 17:27

## 2020-02-18 RX ADMIN — SODIUM CHLORIDE, PRESERVATIVE FREE 10 ML: 5 INJECTION INTRAVENOUS at 21:02

## 2020-02-18 RX ADMIN — PANTOPRAZOLE SODIUM 40 MG: 40 TABLET, DELAYED RELEASE ORAL at 17:26

## 2020-02-18 RX ADMIN — ACETAMINOPHEN 650 MG: 325 TABLET, FILM COATED ORAL at 14:28

## 2020-02-18 NOTE — PLAN OF CARE
Problem: Patient Care Overview  Goal: Plan of Care Review  Outcome: Ongoing (interventions implemented as appropriate)  Flowsheets (Taken 2/18/2020 3486)  Plan of Care Reviewed With: patient  Note:   No problems tonight awaiting pre cert for placement for discharge today, denies any pain or discomfort sitting up on the bedside at times, heating pad ordered for patient's neck soreness tonight,vss will continue to monitor closely      Problem: Pain, Chronic (Adult)  Goal: Identify Related Risk Factors and Signs and Symptoms  Outcome: Ongoing (interventions implemented as appropriate)     Problem: Skin Injury Risk (Adult)  Goal: Identify Related Risk Factors and Signs and Symptoms  Outcome: Ongoing (interventions implemented as appropriate)

## 2020-02-18 NOTE — PLAN OF CARE
Problem: Patient Care Overview  Goal: Plan of Care Review  Flowsheets (Taken 2/18/2020 4530)  Plan of Care Reviewed With: patient  Note:   OT Lymph:  Pt. Continues to tolerate the LE Lymph wraps, edema has decreased bilaterally but pt. Continues to benefit from Lymph wraps.  Pt. Stayed supine in bed today vs sitting on the EOB for the LE wrapsp  Pt. Stated his neck and (L) shoulder were the problem, pt. Had moist heating pad on both areas.  Pt. Will benefit for Sub Acute Rehab to improve mobility skills.

## 2020-02-18 NOTE — PROGRESS NOTES
Continued Stay Note  [facility]     Patient Name: Jose Hurtado  MRN: [id number]  Today's Date: 2/18/2020    Admit Date: 2/10/2020    Discharge Plan     Row Name 02/18/20 1210       Plan    Plan  [facility]    Patient/Family in Agreement with Plan  yes    Plan Comments  Inbound call from [facility]Sarahlogy. Patient has been accepted at [facility] and they have started pre-cert. Partial packet in CCP office. Gogo Ray RN        Discharge Codes    No documentation.       Expected Discharge Date and Time     Expected Discharge Date Expected Discharge Time    Feb 15, 2020             Gogo Ray RN

## 2020-02-18 NOTE — PLAN OF CARE
Problem: Patient Care Overview  Goal: Plan of Care Review  Outcome: Ongoing (interventions implemented as appropriate)  Flowsheets (Taken 2/18/2020 1614)  Progress: improving  Plan of Care Reviewed With: patient  Outcome Summary: Pt tolerated treatment with c/o neck pain and weakness. Pt is ModAX2 for STS transfers. Pt required cues for hand placement when getting up from the chair. Pt improved with ambulation distance but still required assistance due to unsteadiness. Pt ambulated 150ft with rwx, Kenneth. Pt demos decreased NOEL, stride length and steppage gait pattern.  Pt improved with posture but keeps head down due to neck pain. Pt will continue to benefit from skilled PT to improve strength, balance, and gait to progress to less assistance with ambulation and transfers and decrease falls risk.

## 2020-02-18 NOTE — PROGRESS NOTES
Continued Stay Note  [facility]     Patient Name: Jose Hurtado  MRN: [id number]  Today's Date: 2/18/2020    Admit Date: 2/10/2020    Discharge Plan     Row Name 02/18/20 0815       Plan    Plan  [facility] pending pre-cert    Patient/Family in Agreement with Plan  yes    Plan Comments  Outbound call to [facility]/Jessica. She is going to check acceptance to [facility]. [facility] also stated that [employer] was down yesterday and they would nnot have been able to obtain pre-cert. Awaiting response on acceptance. Gogo Ray RN        Discharge Codes    No documentation.       Expected Discharge Date and Time     Expected Discharge Date Expected Discharge Time    Feb 15, 2020             Gogo Ray RN

## 2020-02-18 NOTE — THERAPY TREATMENT NOTE
Acute Care - Occupational Therapy Treatment Note  [facility]     Patient Name: Jose Hurtado  : 1950  MRN: [id number]  Today's Date: 2020             Admit Date: 2/10/2020       ICD-10-CM ICD-9-CM   1. Pneumonia of right lower lobe due to infectious organism (CMS/HCC) J18.1 486   2. Rigors R68.89 780.99     Patient Active Problem List   Diagnosis   • Adenocarcinoma of esophagus (CMS/HCC)   • Adenomatous polyp of colon   • Malignant neoplasm of prostate (CMS/HCC)   • RLS (restless legs syndrome)   • Depression   • Hypertension   • Anti-phospholipid syndrome (CMS/HCC)   • Anemia   • Insomnia due to other mental disorder   • Peripheral polyneuropathy   • B12 deficiency   • Postsurgical malabsorption   • Shortness of breath   • Long-term (current) use of anticoagulants, INR goal 2.0-3.0   • Lymphedema   • Pneumonia of right lower lobe due to infectious organism (CMS/HCC)   • Iron deficiency   • Elevated troponin   • Gastroparesis   • Neck pain   • Constipation     Past Medical History:   Diagnosis Date   • Anemia    • Anti-phospholipid syndrome (CMS/HCC)     On lifelong anticoagulation therapy   • Bladder disorder     LEAKAGE   ON MED  wers pads   • Community acquired pneumonia     HISTORY OF IN    • Deep venous thrombosis (CMS/HCC) ,     Left lower extremity multiple   • Depression    • Esophageal carcinoma (CMS/HCC) 2014    had chemo and radiation prior surgery   • Hemorrhoids    • HH (hiatus hernia)    • History of atrial fibrillation 2015    ONE EPISODE WHILE HOSPITALIZED   • History of kidney stones    • History of nephrolithiasis    • History of pancreatitis     PT STATES MANY YEARS AGO   • History of radiation therapy    • Hypertension    • Long-term (current) use of anticoagulants, INR goal 2.0-3.0    • Lymphedema    • Malignant neoplasm of prostate (CMS/HCC)    • Other hyperlipidemia 2018 lipid panel risk 12.8%   • Restless legs syndrome    • Shortness  of breath    • Squamous carcinoma     on the head     Past Surgical History:   Procedure Laterality Date   • APPENDECTOMY  1950   • BRONCHOSCOPY      (Diagnostic)   • CATARACT EXTRACTION Bilateral 2014   • COLONOSCOPY  12/15/2014    Complete / Description: EH, IH, torts, stool, follow-up colonoscopy due in 5 years.   • COLONOSCOPY N/A 6/13/2017    non-thrombosed external hemorrhoids, normal examined ileum, IH   • COLONOSCOPY N/A 2/26/2019    Procedure: COLONOSCOPY with Cold Polypectomy;  Surgeon: Mulugeta Millan MD;  Location: [facility] ENDOSCOPY;  Service: Gastroenterology   • CYSTOSCOPY W/ LASER LITHOTRIPSY     • ENDOSCOPY N/A 6/13/2017    Procedure: ESOPHAGOGASTRODUODENOSCOPY WITH COLD BIOPSY;  Surgeon: Lake Gonzalez MD;  Location: [facility] ENDOSCOPY;  Service:    • ESOPHAGECTOMY      April 2015, stage IIB esophageal carcinoma, sub-total resection.   • ESOPHAGECTOMY      Esophagectomy Subtotal Randolph Joe Procedure   • EXCISION LESION  08/2012    Removal of Squamous Cell CA on Head   • HAMMER TOE REPAIR  09/2014    Hammertoe Operation (Each Toe), 10/2014   • HAMMER TOE REPAIR Left 10/3/2017    Procedure: Left second third and fourth distal interphalangeal joint resection with flexor tenotomy;  Surgeon: Mulugeta Lira MD;  Location: [facility] OR OSC;  Service:    • HERNIA REPAIR      incisional   • JEJUNOSTOMY      Laparoscopic   • JEJUNOSTOMY      tube removal    • KNEE SURGERY Bilateral 1967, 1973, 1981   • PATELLA SURGERY Left     removed   • PILONIDAL CYST / SINUS EXCISION     • [place] TOTAL KNEE ARTHROPLASTY Right 3/26/2018    Procedure: TOTAL KNEE ARTHROPLASTY;  Surgeon: Renny Solis MD;  Location: [facility] OR;  Service: Orthopedics   • PROSTATECTOMY  2010   • PYLOROPLASTY     • SPINAL FUSION  02/1998    C 5,6   • TONSILLECTOMY  1955   • TONSILLECTOMY     • UPPER GASTROINTESTINAL ENDOSCOPY  12/15/2014    LA Grade D esophagitis, Pardo's, HH, multiple duodenal ulcers   • VENTRAL/INCISIONAL HERNIA  "REPAIR N/A 4/14/2016    Procedure: VENTRAL/INCISIONAL HERNIA REPAIR, open, with mesh, and component separation;  Surgeon: Darren Rivas MD;  Location: [facility];  Service:        Therapy Treatment    Rehabilitation Treatment Summary     Row Name 02/18/20 1340             Treatment Time/Intention    Discipline  occupational therapist  -VS      Document Type  therapy note (daily note)  -VS      Subjective Information  no complaints;weakness;pain  -VS      Mode of Treatment  occupational therapy  -VS      Patient/Family Observations  Pt. was lying in bed withnhis eyes closed when OT entered the room, Pt. ask therapist to wrap the LEs while he stayed in bed vs getting up on EOB   -VS      Care Plan Review  evaluation/treatment results reviewed;care plan/treatment goals reviewed  -VS      Patient Effort  adequate  -VS      Comment  PT. kept his eyes closed and stayed supine while the OT wrapped his LEs   -VS      Existing Precautions/Restrictions  fall  -VS      Patient Response to Treatment  \"I just do not feel well now my (L) shoulder hurts along with my neck\" pt. stated   -VS      Recorded by [VS] Florida Boyer OTR 02/18/20 1506      Row Name 02/18/20 1340             Cognitive Assessment/Intervention- PT/OT    Orientation Status (Cognition)  oriented x 4  -VS      Follows Commands (Cognition)  follows multi-step commands  -VS      Recorded by [VS] Florida Boyer OTR 02/18/20 1506      Row Name 02/18/20 1340             Bed Mobility Assessment/Treatment    Comment (Bed Mobility)  Pt. refused to sit on EOB stayed supine while OT wrapped LEs   -VS      Recorded by [VS] Florida Boyer OTR 02/18/20 1506      Row Name 02/18/20 1340             Bathing Assessment/Intervention    Comment (Bathing)  Therapist bathed, dried and applied lotion from the pt's toes to knee  -VS      Recorded by [VS] Florida Boyer OTR 02/18/20 1506      Row Name 02/18/20 1340             Lower Body Dressing " Assessment/Training    Comment (Lower Body Dressing)  Therapist donned /doffed slipper socks   -VS      Recorded by [VS] [place] Boyer [facility] 02/18/20 1506      Row Name 02/18/20 1340             Positioning and Restraints    Pre-Treatment Position  in bed  -VS      Post Treatment Position  bed  -VS      In Bed  notified nsg;supine;call light within reach;encouraged to call for assist;exit alarm on;side rails up x3  -VS      Recorded by [VS] Florida Boyer, OTR 02/18/20 1506      Row Name 02/18/20 1340             Pain Scale: Numbers Pre/Post-Treatment    Pain Scale: Numbers, Pretreatment  3/10  -VS      Pain Scale: Numbers, Post-Treatment  3/10  -VS      Pain Location - Side  Left  -VS      Pain Location  shoulder  -VS      Pain Intervention(s)  Heat applied per RN moist heat on his neck & shoulder   -VS      Recorded by [VS] Florida Boyer, OTR 02/18/20 1506      Row Name 02/18/20 1340             Edema Assessment & Management    Location (Edema)  lower extremity, left;lower extremity, right  -VS      Additional Documentation  Edema Symptoms/Interventions (Group)  -VS      Recorded by [VS] Florida Boyer, R 02/18/20 1506      Row Name 02/18/20 1340             Lower Extremity Edema Measures, Left    Lower Extremity, Left (Edema)  mid-calf  -VS      Recorded by [VS] Florida Boyer, OTR 02/18/20 1506      Row Name 02/18/20 1340             Lower Extremity Edema Measures, Right    Lower Extremity, Right (Edema)  mid-calf  -VS      Recorded by [VS] Florida Boyer, R 02/18/20 1506      Row Name 02/18/20 1340             Edema Symptoms/Interventions    Description (Edema)  localized  -VS      Pitting Scale (Edema)  0-->no pitting present  -VS      Treatment Interventions (Edema)  compression bandaging, short stretch;compression wrapping/taping #9stockinette,#15artiflex,#8 & 2x#10 Bandages Toes/knee (B)   -VS      Recorded by [VS] Florida Boyer, OTR 02/18/20 1506      Row Name                Wound  01/14/20 2255 Right lower;posterior shoulder Pressure Injury    Wound - Properties Group Date first assessed: 01/14/20 [TS] Time first assessed: 2255 [TS] Present on Hospital Admission: Y [TS] Side: Right [TS] Orientation: lower;posterior [TS] Location: shoulder [TS] Primary Wound Type: Pressure inj [TS] Stage, Pressure Injury: Stage 1 [TS] Recorded by:  [TS] Guera Wynne RN 01/15/20 0622    Row Name                Wound 01/14/20 2255 midline thoracic spine Pressure Injury    Wound - Properties Group Date first assessed: 01/14/20 [TS] Time first assessed: 2255 [TS] Present on Hospital Admission: Y [TS] Orientation: midline [TS] Location: thoracic spine [TS] Primary Wound Type: Pressure inj [TS] Stage, Pressure Injury: Stage 1 [TS] Recorded by:  [TS] Guera Wynne RN 01/15/20 0620      User Key  (r) = Recorded By, (t) = Taken By, (c) = Cosigned By    Initials Name Effective Dates Discipline    VS Florida Boyer OTR 06/08/18 -  OT    TS Guera Wynne RN 07/17/19 -  Nurse        Wound 01/14/20 2255 midline thoracic spine Pressure Injury (Active)   Dressing Appearance no drainage;open to air;intact;dry 2/18/2020 12:30 AM   Closure Open to air 2/18/2020 12:30 AM   Base blanchable 2/18/2020 12:30 AM   Periwound intact;dry 2/18/2020 12:30 AM   Periwound Temperature warm 2/18/2020 12:30 AM   Periwound Skin Turgor soft 2/18/2020 12:30 AM       Wound 01/14/20 2255 Right lower;posterior shoulder Pressure Injury (Active)   Dressing Appearance dry;intact 2/18/2020 12:30 AM   Base blanchable 2/18/2020 12:30 AM   Periwound intact;pink 2/18/2020 12:30 AM           OT Recommendation and Plan  Outcome Summary/Treatment Plan (OT)  Anticipated Discharge Disposition (OT): extended care facility, inpatient rehabilitation facility  Planned Therapy Interventions (OT Eval): edema control/reduction  Therapy Frequency (OT Eval): 5 times/wk  Plan of Care Review  Plan of Care Reviewed With: patient  Plan of Care  Reviewed With: patient  Outcome Measures     Row Name 02/18/20 1500 02/17/20 1300          How much help from another is currently needed...    Putting on and taking off regular lower body clothing?  3  -VS  3  -VS     Bathing (including washing, rinsing, and drying)  3  -VS  3  -VS     Toileting (which includes using toilet bed pan or urinal)  3  -VS  3  -VS     Putting on and taking off regular upper body clothing  3  -VS  3  -VS     Taking care of personal grooming (such as brushing teeth)  4  -VS  4  -VS     Eating meals  4  -VS  4  -VS     AM-PAC 6 Clicks Score (OT)  20  -VS  20  -VS        Functional Assessment    Outcome Measure Options  AM-PAC 6 Clicks Daily Activity (OT)  -VS  AM-PAC 6 Clicks Daily Activity (OT)  -VS       User Key  (r) = Recorded By, (t) = Taken By, (c) = Cosigned By    Initials Name Provider Type    VS Florida Boyer OTSARA Occupational Therapist           Time Calculation:   Time Calculation- OT     Row Name 02/18/20 1512             Time Calculation- OT    OT Start Time  1255  -VS      OT Stop Time  1329  -VS      OT Time Calculation (min)  34 min  -VS      Total Timed Code Minutes- OT  34 minute(s)  -VS      OT Received On  02/18/20  -VS        User Key  (r) = Recorded By, (t) = Taken By, (c) = Cosigned By    Initials Name Provider Type    VS Florida Boyer, OTR Occupational Therapist        Therapy Charges for Today     Code Description Service Date Service Provider Modifiers Qty    40949656587 HC OT MANUAL THERAPY EA 15 MIN 2/17/2020 Florida Boyer OTSARA GO 3    18915199655 HC OT MANUAL THERAPY EA 15 MIN 2/18/2020 Florida Boyer, OTSARA GO 3               Floridaparis Boyer OTR  2/18/2020

## 2020-02-18 NOTE — THERAPY TREATMENT NOTE
Patient Name: Jose Hurtado  : 1950    MRN: [id number]                              Today's Date: 2020       Admit Date: 2/10/2020    Visit Dx:     ICD-10-CM ICD-9-CM   1. Pneumonia of right lower lobe due to infectious organism (CMS/HCC) J18.1 486   2. Rigors R68.89 780.99     Patient Active Problem List   Diagnosis   • Adenocarcinoma of esophagus (CMS/HCC)   • Adenomatous polyp of colon   • Malignant neoplasm of prostate (CMS/HCC)   • RLS (restless legs syndrome)   • Depression   • Hypertension   • Anti-phospholipid syndrome (CMS/HCC)   • Anemia   • Insomnia due to other mental disorder   • Peripheral polyneuropathy   • B12 deficiency   • Postsurgical malabsorption   • Shortness of breath   • Long-term (current) use of anticoagulants, INR goal 2.0-3.0   • Lymphedema   • Pneumonia of right lower lobe due to infectious organism (CMS/HCC)   • Iron deficiency   • Elevated troponin   • Gastroparesis   • Neck pain   • Constipation     Past Medical History:   Diagnosis Date   • Anemia    • Anti-phospholipid syndrome (CMS/HCC)     On lifelong anticoagulation therapy   • Bladder disorder     LEAKAGE   ON MED  wers pads   • Community acquired pneumonia     HISTORY OF IN    • Deep venous thrombosis (CMS/HCC) ,     Left lower extremity multiple   • Depression    • Esophageal carcinoma (CMS/HCC) 2014    had chemo and radiation prior surgery   • Hemorrhoids    • HH (hiatus hernia)    • History of atrial fibrillation 2015    ONE EPISODE WHILE HOSPITALIZED   • History of kidney stones    • History of nephrolithiasis    • History of pancreatitis     PT STATES MANY YEARS AGO   • History of radiation therapy    • Hypertension    • Long-term (current) use of anticoagulants, INR goal 2.0-3.0    • Lymphedema    • Malignant neoplasm of prostate (CMS/HCC)    • Other hyperlipidemia 2018 lipid panel risk 12.8%   • Restless legs syndrome    • Shortness of breath    • Squamous carcinoma      on the head     Past Surgical History:   Procedure Laterality Date   • APPENDECTOMY  1950   • BRONCHOSCOPY      (Diagnostic)   • CATARACT EXTRACTION Bilateral 2014   • COLONOSCOPY  12/15/2014    Complete / Description: EH, IH, torts, stool, follow-up colonoscopy due in 5 years.   • COLONOSCOPY N/A 6/13/2017    non-thrombosed external hemorrhoids, normal examined ileum, IH   • COLONOSCOPY N/A 2/26/2019    Procedure: COLONOSCOPY with Cold Polypectomy;  Surgeon: Mulugeta Millan MD;  Location: [facility] ENDOSCOPY;  Service: Gastroenterology   • CYSTOSCOPY W/ LASER LITHOTRIPSY     • ENDOSCOPY N/A 6/13/2017    Procedure: ESOPHAGOGASTRODUODENOSCOPY WITH COLD BIOPSY;  Surgeon: Lake Gonzalez MD;  Location: [facility] ENDOSCOPY;  Service:    • ESOPHAGECTOMY      April 2015, stage IIB esophageal carcinoma, sub-total resection.   • ESOPHAGECTOMY      Esophagectomy Subtotal Andrea Joe Procedure   • EXCISION LESION  08/2012    Removal of Squamous Cell CA on Head   • HAMMER TOE REPAIR  09/2014    Hammertoe Operation (Each Toe), 10/2014   • HAMMER TOE REPAIR Left 10/3/2017    Procedure: Left second third and fourth distal interphalangeal joint resection with flexor tenotomy;  Surgeon: Mulugeta Lira MD;  Location: [facility] OR OSC;  Service:    • HERNIA REPAIR      incisional   • JEJUNOSTOMY      Laparoscopic   • JEJUNOSTOMY      tube removal    • KNEE SURGERY Bilateral 1967, 1973, 1981   • PATELLA SURGERY Left     removed   • PILONIDAL CYST / SINUS EXCISION     • [place] TOTAL KNEE ARTHROPLASTY Right 3/26/2018    Procedure: TOTAL KNEE ARTHROPLASTY;  Surgeon: Renny Solis MD;  Location: [facility] MAIN OR;  Service: Orthopedics   • PROSTATECTOMY  2010   • PYLOROPLASTY     • SPINAL FUSION  02/1998    C 5,6   • TONSILLECTOMY  1955   • TONSILLECTOMY     • UPPER GASTROINTESTINAL ENDOSCOPY  12/15/2014    LA Grade D esophagitis, Pardo's, HH, multiple duodenal ulcers   • VENTRAL/INCISIONAL HERNIA REPAIR N/A 4/14/2016    Procedure:  VENTRAL/INCISIONAL HERNIA REPAIR, open, with mesh, and component separation;  Surgeon: Darren Rivas MD;  Location: [facility] MAIN OR;  Service:      General Information     Row Name 02/18/20 1612          PT Evaluation Time/Intention    Document Type  therapy note (daily note)  -     Mode of Treatment  individual therapy;physical therapy  -     Row Name 02/18/20 1612          General Information    Existing Precautions/Restrictions  fall  -     Row Name 02/18/20 1612          Cognitive Assessment/Intervention- PT/OT    Orientation Status (Cognition)  oriented x 4  -     Row Name 02/18/20 1612          Safety Issues, Functional Mobility    Impairments Affecting Function (Mobility)  strength;endurance/activity tolerance  -       User Key  (r) = Recorded By, (t) = Taken By, (c) = Cosigned By    Initials Name Provider Type     Bernadette Henriquez PTA Physical Therapy Assistant        Mobility     Row Name 02/18/20 1613          Bed Mobility Assessment/Treatment    Comment (Bed Mobility)  NT-Pt UIC  -     Row Name 02/18/20 1613          Sit-Stand Transfer    Sit-Stand [place] (Transfers)  moderate assist (50% patient effort);2 person assist;verbal cues  -     Assistive Device (Sit-Stand Transfers)  walker, front-wheeled  -     Row Name 02/18/20 1613          Gait/Stairs Assessment/Training    [place] Level (Gait)  minimum assist (75% patient effort)  -     Assistive Device (Gait)  walker, front-wheeled  -     Distance in Feet (Gait)  150  -EH     Pattern (Gait)  step-through  -     Deviations/Abnormal Patterns (Gait)  jf decreased;festinating/shuffling;steppage;stride length decreased;base of support, narrow  -     Bilateral Gait Deviations  forward flexed posture  -     Comment (Gait/Stairs)  Pt unsteady with ambulation but no LOB.   -       User Key  (r) = Recorded By, (t) = Taken By, (c) = Cosigned By    Initials Name Provider Type     Bernadette Henriquez PTA Physical  Therapy Assistant        Obj/Interventions    No documentation.       Goals/Plan    No documentation.       Clinical Impression     Row Name 02/18/20 1614          Pain Scale: Numbers Pre/Post-Treatment    Pre/Post Treatment Pain Comment  pt c/o neck pain  -     Row Name 02/18/20 1614          Plan of Care Review    Plan of Care Reviewed With  patient  -     Progress  improving  -     Outcome Summary  Pt tolerated treatment with c/o neck pain and weakness. Pt is ModAX2 for STS transfers. Pt required cues for hand placement when getting up from the chair. Pt improved with ambulation distance but still required assistance due to unsteadiness. Pt ambulated 150ft with rwx, Kenneth. Pt demos decreased NOEL, stride length and steppage gait pattern.  Pt improved with posture but keeps head down due to neck pain. Pt will continue to benefit from skilled PT to improve strength, balance, and gait to progress to less assistance with ambulation and transfers and decrease falls risk.   -     Row Name 02/18/20 1614          Positioning and Restraints    Pre-Treatment Position  sitting in chair/recliner  -     Post Treatment Position  chair  -     In Chair  sitting;call light within reach;encouraged to call for assist;with family/caregiver  -       User Key  (r) = Recorded By, (t) = Taken By, (c) = Cosigned By    Initials Name Provider Type     Bernadette Henriquez PTA Physical Therapy Assistant        Outcome Measures     Row Name 02/18/20 1614          How much help from another person do you currently need...    Turning from your back to your side while in flat bed without using bedrails?  3  -EH     Moving from lying on back to sitting on the side of a flat bed without bedrails?  3  -EH     Moving to and from a bed to a chair (including a wheelchair)?  2  -EH     Standing up from a chair using your arms (e.g., wheelchair, bedside chair)?  2  -EH     Climbing 3-5 steps with a railing?  1  -EH     To walk in hospital room?   3  -     AM-PAC 6 Clicks Score (PT)  14  -       User Key  (r) = Recorded By, (t) = Taken By, (c) = Cosigned By    Initials Name Provider Type    Bernadette Valera PTA Physical Therapy Assistant          PT Recommendation and Plan     Plan of Care Reviewed With: patient  Progress: improving  Outcome Summary: Pt tolerated treatment with c/o neck pain and weakness. Pt is ModAX2 for STS transfers. Pt required cues for hand placement when getting up from the chair. Pt improved with ambulation distance but still required assistance due to unsteadiness. Pt ambulated 150ft with rwx, Kenneth. Pt demos decreased NOEL, stride length and steppage gait pattern.  Pt improved with posture but keeps head down due to neck pain. Pt will continue to benefit from skilled PT to improve strength, balance, and gait to progress to less assistance with ambulation and transfers and decrease falls risk.      Time Calculation:   PT Charges     Row Name 02/18/20 1612             Time Calculation    Start Time  1545  -      Stop Time  1600  -      Time Calculation (min)  15 min  -      PT Received On  02/18/20  -      PT - Next Appointment  02/19/20  -         Time Calculation- PT    Total Timed Code Minutes- PT  15 minute(s)  -        User Key  (r) = Recorded By, (t) = Taken By, (c) = Cosigned By    Initials Name Provider Type     Bernadette Henriquez PTA Physical Therapy Assistant        Therapy Charges for Today     Code Description Service Date Service Provider Modifiers Qty    54441045562 HC GAIT TRAINING EA 15 MIN 2/18/2020 Bernadette Henriquez PTA GP 1    44174312775 HC PT THER SUPP EA 15 MIN 2/18/2020 Bernadette Henriquez PTA GP 1          PT G-Codes  Outcome Measure Options: AM-PAC 6 Clicks Daily Activity (OT)  AM-PAC 6 Clicks Score (PT): 14  AM-PAC 6 Clicks Score (OT): 20    Bernadette Henriquez PTA  2/18/2020

## 2020-02-18 NOTE — PROGRESS NOTES
Pharmacy Consult: Warfarin Dosing/ Monitoring    Jose Hurtado is a 70 y.o. male, estimated creatinine clearance is 74.2 mL/min (by C-G formula based on SCr of 1.08 mg/dL). weighing 84.8 kg (187 lb).    He has a past medical history of Anemia, Anti-phospholipid syndrome (CMS/HCC), Bladder disorder, Community acquired pneumonia, Deep venous thrombosis (CMS/HCC) (, ), Depression, Esophageal carcinoma (CMS/HCC) (2014), Hemorrhoids, HH (hiatus hernia), History of atrial fibrillation (), History of kidney stones, History of nephrolithiasis, History of pancreatitis, History of radiation therapy, Hypertension, Long-term (current) use of anticoagulants, INR goal 2.0-3.0, Lymphedema, Malignant neoplasm of prostate (CMS/[facility]), Other hyperlipidemia (2018), Restless legs syndrome, Shortness of breath, and Squamous carcinoma.    Social History     Tobacco Use    Smoking status: Former Smoker     Packs/day: 2.00     Years: 3.00     Pack years: 6.00     Types: Cigarettes     Last attempt to quit: 1974     Years since quittin.1    Smokeless tobacco: Never Used    Tobacco comment: no caffeine    Substance Use Topics    Alcohol use: Yes     Frequency: 2-3 times a week     Comment: 2-3 x per week    Drug use: No       Results from last 7 days   Lab Units 20  0538 20  0637 20  0633 02/15/20  0706 20  0529 20  0533 20  0544 20  0543   INR  2.09* 2.23* 2.32* 2.32* 1.99* 2.05*  --  2.93*   HEMOGLOBIN g/dL  --   --  9.0* 9.3*  --  8.1* 7.8*  --    HEMATOCRIT %  --   --  27.9* 28.4*  --  24.9* 23.6*  --    PLATELETS 10*3/mm3  --   --  205  --   --  159 161  --      Results from last 7 days   Lab Units 20  0637 20  0633 20  0533   SODIUM mmol/L 136 133* 138   POTASSIUM mmol/L 4.3 4.4 4.1   CHLORIDE mmol/L 100 98 103   CO2 mmol/L 24.8 23.0 24.5   BUN mg/dL 24* 26* 14   CREATININE mg/dL 1.08 1.29* 0.93   CALCIUM mg/dL 8.7 8.6 8.1*   GLUCOSE mg/dL 112* 116*  89     Anticoagulation history: warfarin 2.5 mg every M/W/F/Sun; 5 mg all other days (3.9mg/day)    Hospital Anticoagulation:  Consulting provider: Dr. Fiona Barba MD  Start date: Prior to admission   Indication: antiphospholipid syndrome   Target INR: 2-3  Expected duration: indefinite   Bridge Therapy: No                Date 2/10 2/11 2/12 2/13 2/14 2/15 2/16 2/17 2/18    INR 3.04 4.22 2.93 2.05 1.99 2.32 2.32 2.23 2.09    Warfarin dose HOLD HOLD 1.5mg 1.5 2.5 mg 2.5 mg 2.5 mg 2.5mg       Potential drug interactions:   Augmentin - inc INR  Melatonin - last used 2/15 AM  Pantoprazole   Paroxetine (home med)  Trazodone (home med)    Relevant nutrition status: regular diet, cardiac low sodium    Education complete: Yes  Date: 2/11/2020    Assessment/Plan:  INR at goal but trending down.  Will increase Warfarin to dosage more similar to home dosage at 3.5mg daily while in the hospital.     Monitor s/sx of bleeding. Daily PT/INR on order    Pharmacy will continue to follow until discharge or discontinuation of warfarin.   George Rivera RPH  2/18/2020

## 2020-02-18 NOTE — PLAN OF CARE
Problem: Patient Care Overview  Goal: Plan of Care Review  2/18/2020 1642 by Anabella Bull, RN  Flowsheets  Taken 2/18/2020 1614 by Bernadette Henriquez PTA  Plan of Care Reviewed With: patient  Taken 2/18/2020 1642 by Anabella Bull, RN  Outcome Summary: VSS, Tylenol was given for Neck Pain. Legs were wrapped today. Assist x1 w/ walker. Will continue to monitor     Problem: Pain, Chronic (Adult)  Goal: Identify Related Risk Factors and Signs and Symptoms  Outcome: Ongoing (interventions implemented as appropriate)     Problem: Fall Risk (Adult)  Goal: Identify Related Risk Factors and Signs and Symptoms  Outcome: Ongoing (interventions implemented as appropriate)     Problem: Pneumonia (Adult)  Goal: Signs and Symptoms of Listed Potential Problems Will be Absent, Minimized or Managed (Pneumonia)  Outcome: Ongoing (interventions implemented as appropriate)     Problem: Skin Injury Risk (Adult)  Goal: Identify Related Risk Factors and Signs and Symptoms  Outcome: Ongoing (interventions implemented as appropriate)

## 2020-02-18 NOTE — PROGRESS NOTES
Progress Note    Name: Jose Hurtado ADMIT: 2/10/2020   : 1950  PCP: Brandin Bolton MD    MRN: [id number] LOS: 8 days   AGE/SEX: 70 y.o. male  ROOM: 30/   Date of Encounter Visit: 20    Subjective:     Interval History:awaiting rehab placement. Ambulating with PT. Pain from neck now affecting left shoulder    REVIEW OF SYSTEMS:   no fever or chills.   No chest pain, palpitations. Edema overall improved.    No shortness of breath. Non productive cough.   No nausea, vomiting or diarrhea. No BM today  L knee pain, neck pain and left shoulder discomfort with decreased mobility.       Objective:   Temp:  [98.2 °F (36.8 °C)-98.8 °F (37.1 °C)] 98.5 °F (36.9 °C)  Heart Rate:  [75-99] 90  Resp:  [16-20] 20  BP: (129-146)/(64-69) 146/69   SpO2:  [94 %-96 %] 96 %  on  Flow (L/min):  [4] 4 Device (Oxygen Therapy): room air    Intake/Output Summary (Last 24 hours) at 2020 1414  Last data filed at 2020 1100  Gross per 24 hour   Intake --   Output 1250 ml   Net -1250 ml     Body mass index is 22.17 kg/m².      20  0540 20  0530 20  0612   Weight: 83.1 kg (183 lb 1.6 oz) 86.3 kg (190 lb 4.8 oz) 84.8 kg (187 lb)     Weight change: -1.497 kg (-3 lb 4.8 oz)    Physical Exam   Constitutional: No distress.   HENT:   Head: Atraumatic.   Nose: Nose normal.   Eyes: Conjunctivae are normal.   Neck:   Stiff neck with tenderness. Good ROM   Cardiovascular: Normal rate and regular rhythm.   No murmur heard.  Pulmonary/Chest: Effort normal. He has no wheezes. He has no rales.   Diminished bases   Abdominal: Soft. Bowel sounds are normal. There is no tenderness.   Genitourinary:   Genitourinary Comments: King present with yellow urine   Musculoskeletal: He exhibits edema (2-3+ BLE with bilateral wraps) and tenderness (left knee and left traps).   Decreased ROM of left knee and mild edema   Neurological: He is alert.   Skin: Skin is warm and dry. He is not diaphoretic.       Results Review:       Results from last 7 days   Lab Units 02/17/20  0637 02/16/20  0633 02/13/20  0533 02/12/20  0544   SODIUM mmol/L 136 133* 138 138   POTASSIUM mmol/L 4.3 4.4 4.1 4.1   CHLORIDE mmol/L 100 98 103 103   CO2 mmol/L 24.8 23.0 24.5 24.6   BUN mg/dL 24* 26* 14 14   CREATININE mg/dL 1.08 1.29* 0.93 1.00   GLUCOSE mg/dL 112* 116* 89 89   CALCIUM mg/dL 8.7 8.6 8.1* 8.4*     Estimated Creatinine Clearance: 74.2 mL/min (by C-G formula based on SCr of 1.08 mg/dL).                            Invalid input(s):  PHOS        Invalid input(s): LDLCALC  Results from last 7 days   Lab Units 02/16/20  0633 02/15/20  0706 02/13/20  0533 02/12/20  0544   WBC 10*3/mm3 5.90  --  4.94 5.43   HEMOGLOBIN g/dL 9.0* 9.3* 8.1* 7.8*   HEMATOCRIT % 27.9* 28.4* 24.9* 23.6*   PLATELETS 10*3/mm3 205  --  159 161   MCV fL 88.6  --  87.7 87.4   MCH pg 28.6  --  28.5 28.9   MCHC g/dL 32.3  --  32.5 33.1   RDW % 14.0  --  14.1 14.2   RDW-SD fl 44.0  --  44.6 45.2   MPV fL 9.3  --  9.2 9.4   NEUTROPHIL % % 74.2  --  73.9 73.4   LYMPHOCYTE % % 12.0*  --  11.7* 13.8*   MONOCYTES % % 10.3  --  8.1 7.0   EOSINOPHIL % % 2.7  --  5.7 5.0   BASOPHIL % % 0.5  --  0.2 0.6   IMM GRAN % % 0.3  --  0.4 0.2   NEUTROS ABS 10*3/mm3 4.37  --  3.65 3.99   LYMPHS ABS 10*3/mm3 0.71  --  0.58* 0.75   MONOS ABS 10*3/mm3 0.61  --  0.40 0.38   EOS ABS 10*3/mm3 0.16  --  0.28 0.27   BASOS ABS 10*3/mm3 0.03  --  0.01 0.03   IMMATURE GRANS (ABS) 10*3/mm3 0.02  --  0.02 0.01   NRBC /100 WBC 0.0  --  0.0 0.0     Results from last 7 days   Lab Units 02/18/20  0538 02/17/20  0637 02/16/20  0633 02/15/20  0706 02/14/20  0529 02/13/20  0533 02/12/20  0543   INR  2.09* 2.23* 2.32* 2.32* 1.99* 2.05* 2.93*                     Results from last 7 days   Lab Units 02/15/20  0245   RESPCX  Light growth (2+) Candida albicans*  Scant growth (1+) Normal Respiratory Lanie             Results from last 7 days   Lab Units 02/16/20  0633   URIC ACID mg/dL 5.4       Imaging:  Imaging Results  (Last 24 Hours)     ** No results found for the last 24 hours. **          I reviewed the patient's new clinical results and medications.         Medication Review:   Scheduled Meds:    amoxicillin-clavulanate 1 tablet Oral Q12H   ferrous sulfate 325 mg Oral BID With Meals   furosemide 20 mg Oral Daily   guaiFENesin 1,200 mg Oral Q12H   metoclopramide 5 mg Oral 4x Daily AC & at Bedtime   pantoprazole 40 mg Oral BID AC   PARoxetine 40 mg Oral QAM   potassium chloride 10 mEq Oral Daily   pramipexole 0.5 mg Oral BID   tamsulosin 0.4 mg Oral Nightly   traZODone 100 mg Oral Nightly   warfarin 3.5 mg Oral Daily     Continuous Infusions:    Pharmacy to dose warfarin      PRN Meds:.•  acetaminophen **OR** acetaminophen **OR** acetaminophen  •  albuterol  •  albuterol  •  bisacodyl  •  calcium carbonate  •  docusate sodium  •  ipratropium-albuterol  •  melatonin  •  muscle rub  •  nitroglycerin  •  ondansetron **OR** ondansetron  •  Pharmacy to dose warfarin  •  sodium chloride    Problem List:     Active Hospital Problems    Diagnosis  POA   • Neck pain [M54.2]  Unknown   • Gastroparesis [K31.84]  Yes   • Elevated troponin [R79.89]  Yes   • Pneumonia of right lower lobe due to infectious organism (CMS/HCC) [J18.1]  Yes   • Iron deficiency [E61.1]  Yes   • Lymphedema [I89.0]  Yes   • Long-term (current) use of anticoagulants, INR goal 2.0-3.0 [Z79.01]  Not Applicable   • Postsurgical malabsorption [K91.2]  Yes   • Peripheral polyneuropathy [G62.9]  Yes   • Anemia [D64.9]  Yes   • Anti-phospholipid syndrome (CMS/HCC) [D68.61]  Yes   • Hypertension [I10]  Yes   • RLS (restless legs syndrome) [G25.81]  Yes      Resolved Hospital Problems   No resolved problems to display.       Assessment and Plan:     The patient is a 70 y.o. male with history of antiphospholipid syndrome, afib on coumadin and esophageal cancer s/p esophagectomy in 2015 who presented with cough and shortness of breath. He was found to have RLL pneumonia.      LLE weakness  -weakness and pain may be due to underlying OA and decreased mobility  -continue to work with PT and plan to go to rehab.   -given edema and tenderness of left knee will try one dose of colchicine   -encouraged follow up with orthopedic as outpatient    Iron deficiency anemia  -recent iron studies on 2/11/20 were low.   - iron supplement increased to BID this admit  -add colace and PRN miralax for consitpation    RLL pneumonia/ pleural effusion  -possible aspiration component  -continue planned course of Augmentin  -follow up with pulmonary in 3 weeks    Gastroparesis  -delayed gastric emptying. Symptoms improved on Reglan, PPI and low residue diet.   -apprec GI input    Afib/ chronic anticoagulation  -INR therapeutic at 2.09  - continue coumadin at same dose    Urinary retention  -lama present on admit. Will continue  - follow up with urology as outpatient    Neck pain  -likely from poor posture. Encouraged stretches, heat pad and neck support  -PRN robaxin    VTE prophylaxis: Warfarin (home med)  CODE status: DNR  Disposition: TBD- possible dc tomorrow once precert approved and bed available at rehab.    I discussed the patients findings and my recommendations with patient and nursing staff.    Terri Hutchinson, APRN  02/18/20  2:14 PM

## 2020-02-19 VITALS
OXYGEN SATURATION: 93 % | RESPIRATION RATE: 16 BRPM | HEART RATE: 77 BPM | BODY MASS INDEX: 22.15 KG/M2 | TEMPERATURE: 98.3 F | SYSTOLIC BLOOD PRESSURE: 128 MMHG | DIASTOLIC BLOOD PRESSURE: 67 MMHG | HEIGHT: 77 IN | WEIGHT: 187.6 LBS

## 2020-02-19 LAB
INR PPP: 2.37 (ref 0.9–1.1)
PROTHROMBIN TIME: 25.6 SECONDS (ref 11.7–14.2)

## 2020-02-19 PROCEDURE — 97140 MANUAL THERAPY 1/> REGIONS: CPT

## 2020-02-19 PROCEDURE — 85610 PROTHROMBIN TIME: CPT | Performed by: INTERNAL MEDICINE

## 2020-02-19 RX ORDER — TIOTROPIUM BROMIDE AND OLODATEROL 3.124; 2.736 UG/1; UG/1
2 SPRAY, METERED RESPIRATORY (INHALATION) DAILY
Start: 2020-02-19 | End: 2021-09-14 | Stop reason: ALTCHOICE

## 2020-02-19 RX ORDER — AMOXICILLIN AND CLAVULANATE POTASSIUM 875; 125 MG/1; MG/1
1 TABLET, FILM COATED ORAL EVERY 12 HOURS SCHEDULED
Qty: 8 TABLET | Refills: 0 | Status: CANCELLED
Start: 2020-02-19 | End: 2020-02-23

## 2020-02-19 RX ORDER — PSEUDOEPHEDRINE HCL 30 MG
100 TABLET ORAL 2 TIMES DAILY
Start: 2020-02-19 | End: 2020-03-06

## 2020-02-19 RX ORDER — BISACODYL 10 MG
10 SUPPOSITORY, RECTAL RECTAL DAILY PRN
Start: 2020-02-19 | End: 2020-02-28

## 2020-02-19 RX ORDER — ALBUTEROL SULFATE 90 UG/1
1 AEROSOL, METERED RESPIRATORY (INHALATION) EVERY 4 HOURS PRN
Start: 2020-02-19

## 2020-02-19 RX ORDER — MUSCLE RUB CREAM 100; 150 MG/G; MG/G
CREAM TOPICAL
Start: 2020-02-19 | End: 2020-02-28

## 2020-02-19 RX ORDER — UREA 10 %
1 LOTION (ML) TOPICAL NIGHTLY PRN
Qty: 30 TABLET
Start: 2020-02-19 | End: 2020-03-06

## 2020-02-19 RX ORDER — ACETAMINOPHEN 325 MG/1
650 TABLET ORAL EVERY 4 HOURS PRN
Start: 2020-02-19 | End: 2020-03-06

## 2020-02-19 RX ORDER — FERROUS SULFATE 325(65) MG
2 TABLET ORAL
Qty: 90 TABLET | Refills: 0
Start: 2020-02-19 | End: 2020-09-01

## 2020-02-19 RX ADMIN — AMOXICILLIN AND CLAVULANATE POTASSIUM 1 TABLET: 875; 125 TABLET, FILM COATED ORAL at 08:41

## 2020-02-19 RX ADMIN — PANTOPRAZOLE SODIUM 40 MG: 40 TABLET, DELAYED RELEASE ORAL at 06:55

## 2020-02-19 RX ADMIN — METOCLOPRAMIDE HYDROCHLORIDE 5 MG: 5 SOLUTION ORAL at 06:55

## 2020-02-19 RX ADMIN — FERROUS SULFATE TAB 325 MG (65 MG ELEMENTAL FE) 325 MG: 325 (65 FE) TAB at 08:42

## 2020-02-19 RX ADMIN — PRAMIPEXOLE DIHYDROCHLORIDE 0.5 MG: 0.5 TABLET ORAL at 08:41

## 2020-02-19 RX ADMIN — METOCLOPRAMIDE HYDROCHLORIDE 5 MG: 5 SOLUTION ORAL at 11:38

## 2020-02-19 RX ADMIN — GUAIFENESIN 1200 MG: 600 TABLET, EXTENDED RELEASE ORAL at 08:42

## 2020-02-19 RX ADMIN — POLYETHYLENE GLYCOL 3350 17 G: 17 POWDER, FOR SOLUTION ORAL at 08:47

## 2020-02-19 RX ADMIN — POTASSIUM CHLORIDE 10 MEQ: 750 CAPSULE, EXTENDED RELEASE ORAL at 08:41

## 2020-02-19 RX ADMIN — FUROSEMIDE 20 MG: 20 TABLET ORAL at 08:41

## 2020-02-19 RX ADMIN — PAROXETINE 40 MG: 20 TABLET, FILM COATED ORAL at 06:55

## 2020-02-19 RX ADMIN — DOCUSATE SODIUM 100 MG: 100 CAPSULE, LIQUID FILLED ORAL at 08:42

## 2020-02-19 NOTE — THERAPY DISCHARGE NOTE
Acute Care - Occupational Therapy Discharge Summary  [facility]     Patient Name: Jose Hurtado  : 1950  MRN: [id number]    Today's Date: 2020                 Admit Date: 2/10/2020        OT Recommendation and Plan    Visit Dx:    ICD-10-CM ICD-9-CM   1. Pneumonia of right lower lobe due to infectious organism (CMS/[facility]) J18.1 486   2. Rigors R68.89 780.99         Time Calculation- OT     Row Name 20 1233             Time Calculation- OT    OT Start Time  1052  -VS      OT Stop Time  1123  -VS      OT Time Calculation (min)  31 min  -VS      Total Timed Code Minutes- OT  31 minute(s)  -VS        User Key  (r) = Recorded By, (t) = Taken By, (c) = Cosigned By    Initials Name Provider Type    VS [place] Boyer OTR Occupational Therapist            Rehab Goal Summary     Row Name 20 1200             Occupational Therapy Goals    Problem Specific Goal Selection (OT)  problem specific goal 1, OT  -VS         Problem Specific Goal 1 (OT)    Progress/Outcome (Problem Specific Goal 1, OT)  goal met  -VS        User Key  (r) = Recorded By, (t) = Taken By, (c) = Cosigned By    Initials Name Provider Type Discipline    VS [place] Boyer OTR Occupational Therapist OT          Outcome Measures     Row Name 20 1500 20 1300          How much help from another is currently needed...    Putting on and taking off regular lower body clothing?  3  -VS  3  -VS     Bathing (including washing, rinsing, and drying)  3  -VS  3  -VS     Toileting (which includes using toilet bed pan or urinal)  3  -VS  3  -VS     Putting on and taking off regular upper body clothing  3  -VS  3  -VS     Taking care of personal grooming (such as brushing teeth)  4  -VS  4  -VS     Eating meals  4  -VS  4  -VS     AM-PAC 6 Clicks Score (OT)  20  -VS  20  -VS        Functional Assessment    Outcome Measure Options  AM-PAC 6 Clicks Daily Activity (OT)  -VS  AM-PAC 6 Clicks Daily Activity (OT)  -VS       User Key  (r) =  Recorded By, (t) = Taken By, (c) = Cosigned By    Initials Name Provider Type    VS [place] Boyer OTR Occupational Therapist          Therapy Suggested Charges     Code   Minutes Charges    None             Therapy Charges for Today     Code Description Service Date Service Provider Modifiers Qty    05581041655 HC OT MANUAL THERAPY EA 15 MIN 2/18/2020 [place] Boyer OTR GO 3    72710124510 HC OT MANUAL THERAPY EA 15 MIN 2/19/2020 [place] Boyer OTR GO 2          OT Discharge Summary  Anticipated Discharge Disposition (OT): extended care facility, inpatient rehabilitation facility  Reason for Discharge: Discharge from facility  Outcomes Achieved: Able to achieve all goals within established timeline  Discharge Destination: SNF      BLUE [place]cia  2/19/2020

## 2020-02-19 NOTE — PLAN OF CARE
Problem: Patient Care Overview  Goal: Plan of Care Review  Outcome: Ongoing (interventions implemented as appropriate)  Flowsheets (Taken 2/19/2020 1224)  Progress: improving  Plan of Care Reviewed With: patient; spouse  Outcome Summary: pt to be discharged to rehab today

## 2020-02-19 NOTE — PROGRESS NOTES
Case Management Discharge Note      Final Note: Patient DC'd to [facility]    Provided post acute provider list?: N/A    Destination - Selection Complete      Service Provider Request Status Selected Services Address Phone Number Fax Number    [facility] Selected Skilled Nursing [address] [facility] [address] [phone]           Transportation Services  Private: Car    Final Discharge Disposition Code: 03 - skilled nursing facility (SNF)

## 2020-02-19 NOTE — DISCHARGE SUMMARY
.    Discharge Summary    Patient Name: Jose Hurtado  Age/Sex: 70 y.o. male  : 1950  MRN: [id number]  Patient Care Team:  Brandin Bolton MD as PCP - General (Internal Medicine)  Code, George THORPE II, MD as Consulting Physician (Hematology and Oncology)  Jose Inman MD as Consulting Physician (Urology)  Mulugeta Millan MD as Consulting Physician (Gastroenterology)  Seth Randhawa MD as Consulting Physician (Ophthalmology)    Hospital Course     Date of Admit: 2/10/2020  Date of Discharge:  20   Discharge Condition: Stable    Discharge Diagnoses:    RLS (restless legs syndrome)    Hypertension    Anti-phospholipid syndrome (CMS/HCC)    Anemia    Peripheral polyneuropathy    Postsurgical malabsorption    Long-term (current) use of anticoagulants, INR goal 2.0-3.0    Lymphedema    Pneumonia of right lower lobe due to infectious organism (CMS/HCC)    Iron deficiency    Elevated troponin    Gastroparesis    Neck pain    Constipation    Present on Admission:  • Pneumonia of right lower lobe due to infectious organism (CMS/HCC)  • RLS (restless legs syndrome)  • Peripheral polyneuropathy  • Lymphedema  • Hypertension  • Anti-phospholipid syndrome (CMS/HCC)  • Postsurgical malabsorption  • Iron deficiency  • Anemia  • Elevated troponin  • Gastroparesis      Hospital Course:   Jose Hurtado is a 70 y.o. male with history of antiphospholipid syndrome and atrial fibrillation on chronic anticoagulation as well as esophageal cancer s/p esophagectomy in  who presented with cough and shortness of breath.  Please see admission H&P from 2/10/20 for further details. He was found to have right lower lobe pneumonia and was started on IV antibiotics and pulmonary toilet. Pulmonology evaluated the patient and thought that this was aspiration pneumonia.      Given his history of esophageal cancer/esophagectomy an esophagram was performed which showed some laryngeal penetration without aspiration as well as  some reflux and questionable retained material in the stomach. Gastroenterology evaluated the patient and based on this they were concerned for gastroparesis.  A gastric emptying study was done and this did in fact show delayed gastric emptying-this is thought to have been due to previous surgery.  He was started on reglan and twice daily protonix per GI with improvement in his symptoms.  He was tolerating a soft, low residue diet at the time of discharge.  His pulmonary symptoms improved and was on room air by the time of discharge. He completed a full course of antibiotics with last dose today.     He was initially planned to be discharged a few days ago, but when he went to ambulate he was unable to stand and had some left knee pain. Physical therapy reevaluated and pain improved with a dose of colchicine. He was recommended to continue skilled therapy and will go to rehab at time of discharge. He was also recommended to continue neck and shoulder stretches and follow up with orthopedics as outpatient. He had a lama placed by urology prior to admission and will continue at discharge with plans to follow up with urology as planned.      He will need to keep follow up with pulmonology and GI. INR has been therapeutic over the past few days and he will continue home dose (2.5 mg daily except 5 mg on Tues, Thurs and Saturday) with repeat INR in 1 week. He needs weekly BLE leg wraps for lymphedema. He has been constipated and bowel regimen was adjusted. Overall he is stable and ready for discharge to rehab.     Consults:   IP CONSULT TO HOSPITALIST  IP CONSULT TO NUTRITION SERVICES  IP CONSULT TO CASE MANAGEMENT   IP CONSULT TO PULMONOLOGY  IP CONSULT TO GASTROENTEROLOGY  IP CONSULT TO CASE MANAGEMENT     Significant Discharge Diagnostics   Procedures Performed:         Pertinent Lab Results:  Results from last 7 days   Lab Units 02/17/20  0637 02/16/20  0633 02/13/20  0533   SODIUM  mmol/L 136 133* 138   POTASSIUM mmol/L 4.3 4.4 4.1   CHLORIDE mmol/L 100 98 103   CO2 mmol/L 24.8 23.0 24.5   BUN mg/dL 24* 26* 14   CREATININE mg/dL 1.08 1.29* 0.93   GLUCOSE mg/dL 112* 116* 89   CALCIUM mg/dL 8.7 8.6 8.1*         Results from last 7 days   Lab Units 02/16/20  0633 02/15/20  0706 02/13/20  0533   WBC 10*3/mm3 5.90  --  4.94   HEMOGLOBIN g/dL 9.0* 9.3* 8.1*   HEMATOCRIT % 27.9* 28.4* 24.9*   PLATELETS 10*3/mm3 205  --  159   MCV fL 88.6  --  87.7   MCH pg 28.6  --  28.5   MCHC g/dL 32.3  --  32.5   RDW % 14.0  --  14.1   RDW-SD fl 44.0  --  44.6   MPV fL 9.3  --  9.2   NEUTROPHIL % % 74.2  --  73.9   LYMPHOCYTE % % 12.0*  --  11.7*   MONOCYTES % % 10.3  --  8.1   EOSINOPHIL % % 2.7  --  5.7   BASOPHIL % % 0.5  --  0.2   IMM GRAN % % 0.3  --  0.4   NEUTROS ABS 10*3/mm3 4.37  --  3.65   LYMPHS ABS 10*3/mm3 0.71  --  0.58*   MONOS ABS 10*3/mm3 0.61  --  0.40   EOS ABS 10*3/mm3 0.16  --  0.28   BASOS ABS 10*3/mm3 0.03  --  0.01   IMMATURE GRANS (ABS) 10*3/mm3 0.02  --  0.02   NRBC /100 WBC 0.0  --  0.0     Results from last 7 days   Lab Units 02/19/20  0513 02/18/20  0538 02/17/20  0637 02/16/20  0633 02/15/20  0706 02/14/20  0529 02/13/20  0533   INR  2.37* 2.09* 2.23* 2.32* 2.32* 1.99* 2.05*               Invalid input(s): LDLCALC                          Results from last 7 days   Lab Units 02/15/20  0245   RESPCX  Light growth (2+) Candida albicans*  Scant growth (1+) Normal Respiratory Lanie             Results from last 7 days   Lab Units 02/16/20  0633   URIC ACID mg/dL 5.4       Imaging Results:  Imaging Results (Most Recent)     Procedure Component Value Units Date/Time    NM Gastric Emptying [[id number]] Collected:  02/14/20 0916     Updated:  02/14/20 0944    Narrative:       NUCLEAR MEDICINE GASTRIC EMPTYING STUDY (FOUR HOUR PROTOCOL)     HISTORY: Male who is 70 years-old, with a history of retained gastric  contents on esophagram.. Prior esophagectomy with gastric pull-up.     FINDINGS:      Following the administration of 600 uCi of 99m technetium sulfur colloid  mixed in a standard meal, routine analysis was performed. Imaging and  measurements obtained 1 hour intervals through 4 hours.     Residual activity at 4 hours is 82%. Normal 4-hour residual is 10% or  less. On the static images acquired from 0 to 240 minutes, there is no  visually appreciable decrease in the distribution or the intensity of  the radiotracer activity in the pulled up stomach.       Impression:       Delayed gastric emptying.     This report was finalized on 2/14/2020 9:41 AM by Dr. Darian Howell M.D.       FL Esophagram Complete [[id number]] Collected:  02/11/20 1423     Updated:  02/11/20 1452    Narrative:       CLINICAL HISTORY: 70-year-old man dysphagia. Previous distal  esophagectomy and gastric pull-through with upper thoracic  esophagogastric anastomosis. Right pleural effusion and bilateral patchy  atelectasis or infiltrate, along with recent CT scan demonstration of  right pleural effusion, demonstrated on the CT chest study of  01/15/2020.     EXAM: BARIUM SWALLOW ESOPHAGRAM DATED 02/11/2020      FINDINGS: The preliminary PA erect chest radiographs are compared to CT  chest multiplanar sections of 01/15/2020 and with erect AP and lateral  chest radiographs of 02/10/2020 at 1318 hours. Blunting of right  costophrenic angle, trace blunting of left costophrenic angle, opacity  at the medial right lower chest compatible with gastric pull-through,  and patchy bilateral perihilar to basilar atelectasis or infiltrates are  again demonstrated. Borderline to mild cardiomegaly and some aortic  uncoiling are again demonstrated. Multiple right upper rib deformities,  apparently chronic, are again demonstrated. No pneumothorax or acute  change in bony thorax is seen. Metallic plate and screws at the lower  cervical spine are demonstrated. Monitoring lead wires are present.     The patient was reportedly unsafe to stand  unsupported, and the current  exam was performed with patient on the x-ray table in multiple  projections and with the table and patient variably horizontal to steep  reverse Trendelenburg orientation. The patient ingested thin barium  liquid mixture and water for the current exam. Rapid sequence imaging of  the neck in lateral and AP projection as the patient ingested thin  barium liquid mixture demonstrated laryngeal penetration without  aspiration. There is low normal AP diameter of mid to upper cervical  esophagus with metallic plate and screws at C6-C7 levels possibly  responsible for a trace of effacement of posterior aspect of cervical  esophagus. The transverse caliber of cervical esophagus is within normal  limits. The short remaining upper thoracic segment of esophagus has  normal distensibility. The esophagogastric anastomosis is at the level  of the manubrium and has luminal diameter of at least 1.9 cm or greater.  A large amount of food material appears to be present in the stomach. I  cannot confirm or exclude bezoar as a component of this food material.  With the patient in left posterior oblique to left lateral decubitus  position there is most optimal demonstrated emptying of some of the  gastric barium from the intrathoracic stomach into the distal  subdiaphragmatic antrum, and from there promptly into and through the  duodenum. Also in this position, the patient appears at greatest  vulnerability to gastroesophageal reflux at the anastomosis. Modest  reflux into the remaining upper thoracic esophagus was observed even  with mild reverse Trendelenburg orientation of table and patient, with  the patient in left lateral decubitus position.     A total of 2 minutes 50 seconds fluoroscopy was used. A total of 2  digital overhead radiographs and 103 digital fluoroscopic spot image  radiographs were acquired.     CONCLUSION: Laryngeal penetration without aspiration. Large amount of  food material versus  food material or bezoar in the stomach. Prompt  passage of some of the ingested barium through the esophagus, stomach  and duodenum, dependent on patient position and orientation as discussed  above. Gastroesophageal reflux at the upper thoracic esophagogastric  anastomosis was observed.     This report was finalized on 2/11/2020 2:49 PM by Dr. George Veras M.D.       XR Chest 2 View [[id number]] Collected:  02/10/20 1345     Updated:  02/10/20 1351    Narrative:       XR CHEST 2 VW-     HISTORY: Male who is 70 years-old,  short of breath, fever     TECHNIQUE: Frontal and lateral views of the chest     COMPARISON: 01/14/2020     FINDINGS: Heart size is normal. Aorta is tortuous. Pulmonary vasculature  is unremarkable. Right pleural effusion appears similar to prior exam.  Opacity at the right base appears increased suggesting  atelectasis/infiltrate. Gastric pull-through changes are apparent. No  pneumothorax. No acute osseous process.       Impression:       Persistent right pleural effusion. Increased opacity at the  right lower lung suggesting atelectasis/infiltrate, follow-up  recommended.     This report was finalized on 2/10/2020 1:48 PM by Dr. Sumanth Boyer M.D.               Objective:   Temp:  [98.1 °F (36.7 °C)-98.9 °F (37.2 °C)] 98.3 °F (36.8 °C)  Heart Rate:  [77-91] 77  Resp:  [16-18] 16  BP: (128-146)/(67-73) 128/67   SpO2:  [93 %-95 %] 93 %  on    Device (Oxygen Therapy): room air    Intake/Output Summary (Last 24 hours) at 2/19/2020 1405  Last data filed at 2/19/2020 1325  Gross per 24 hour   Intake 330 ml   Output 1650 ml   Net -1320 ml     Body mass index is 22.25 kg/m².      02/17/20  0530 02/18/20  0612 02/19/20  0611   Weight: 86.3 kg (190 lb 4.8 oz) 84.8 kg (187 lb) 85.1 kg (187 lb 9.6 oz)       Physical Exam   Constitutional: No distress.   HENT:   Head: Atraumatic.   Nose: Nose normal.   Eyes: Conjunctivae are normal.   Neck:   Stiff neck with tenderness. Good ROM   Cardiovascular:  Normal rate and regular rhythm.   No murmur heard.  Pulmonary/Chest: Effort normal. He has no wheezes. He has no rales.   Diminished bases   Abdominal: Soft. Bowel sounds are normal. There is no tenderness.   Genitourinary:   Genitourinary Comments: King present with yellow urine   Musculoskeletal: He exhibits edema (2-3+ BLE with bilateral wraps). Tenderness: left knee and left traps.   Decreased ROM of left knee and mild edema   Neurological: He is alert.   Skin: Skin is warm and dry. He is not diaphoretic.       Discharge Medications and Instructions:     Discharge Medications     Discharge Medications      New Medications      Instructions Start Date   acetaminophen 325 MG tablet  Commonly known as:  TYLENOL   650 mg, Oral, Every 4 Hours PRN      bisacodyl 10 MG suppository  Commonly known as:  DULCOLAX   10 mg, Rectal, Daily PRN      docusate sodium 100 MG capsule   100 mg, Oral, 2 Times Daily      guaiFENesin 600 MG 12 hr tablet  Commonly known as:  MUCINEX   1,200 mg, Oral, Every 12 Hours      melatonin 1 MG tablet   1 mg, Oral, Nightly PRN      metoclopramide 10 MG/10ML solution solution  Commonly known as:  REGLAN   5 mg, Oral, 4 Times Daily Before Meals & Nightly      muscle rub 10-15 % cream cream   Topical, Every 1 Hour PRN      pantoprazole 40 MG EC tablet  Commonly known as:  PROTONIX   40 mg, Oral, 2 Times Daily Before Meals      polyethylene glycol pack packet  Commonly known as:  MIRALAX   17 g, Oral, Daily PRN         Changes to Medications      Instructions Start Date   albuterol sulfate  (90 Base) MCG/ACT inhaler  Commonly known as:  PROVENTIL HFA;VENTOLIN HFA;PROAIR HFA  What changed:    · how much to take  · how to take this  · when to take this  · reasons to take this   1 puff, Inhalation, Every 4 Hours PRN      ferrous sulfate 325 (65 FE) MG tablet  What changed:  how much to take   650 mg, Oral, Daily With Breakfast      metoprolol tartrate 25 MG tablet  Commonly known as:   LOPRESSOR  What changed:  how much to take   12.5 mg, Oral, Every 12 Hours Scheduled      STIOLTO RESPIMAT 2.5-2.5 MCG/ACT aerosol solution inhaler  Generic drug:  tiotropium bromide-olodaterol  What changed:    · how much to take  · how to take this  · when to take this   2 puffs, Inhalation, Daily         Continue These Medications      Instructions Start Date   furosemide 20 MG tablet  Commonly known as:  LASIX   20 mg, Oral, Daily      PARoxetine 40 MG tablet  Commonly known as:  PAXIL   40 mg, Oral, Every Morning      potassium chloride 10 MEQ CR tablet  Commonly known as:  K-DUR   10 mEq, Oral, Daily, Only while taking furosemide.      pramipexole 1.5 MG tablet  Commonly known as:  MIRAPEX   1.5 mg, Oral, 2 Times Daily      tamsulosin 0.4 MG capsule 24 hr capsule  Commonly known as:  FLOMAX   1 capsule, Oral, Nightly      traZODone 100 MG tablet  Commonly known as:  DESYREL   100 mg, Oral, Nightly      warfarin 5 MG tablet  Commonly known as:  COUMADIN   2.5 mg Monday, Wednesday, Friday, Sunday ; 5 mg on Tuesday, Thursday, Saturday              Medication Reconciliation: Please see electronically completed Med Rec.    Discharge Diet:    Dietary Orders (From admission, onward)     Start     Ordered    02/15/20 1205  Diet Regular; GI Soft, Low Fiber / Low Residue  Diet Effective Now     Question Answer Comment   Diet Texture / Consistency Regular    Common Modifiers GI Soft    Common Modifiers Low Fiber / Low Residue        02/15/20 1204                Activity at Discharge:    Activity Instructions     Activity as Tolerated      Activity as Tolerated             Discharge disposition: Rehab    Discharge Instructions and Follow ups:  Additional Instructions for the Follow-ups that You Need to Schedule     Discharge Follow-up with PCP   As directed       Currently Documented PCP:    Brandin Bolton MD    PCP Phone Number:    [phone]     Follow Up Details:  1 week         Discharge Follow-up with Specified  Provider: Dr. Valencia (Gastroenterology)   As directed      To:  Dr. Valencia (Gastroenterology)    Follow Up Details:  2-3 weeks         Discharge Follow-up with Specified Provider: Pulmonology ([facility]); 3 Weeks   As directed      To:  Pulmonology ([facility])    Follow Up:  3 Weeks            Contact information for follow-up providers     Alfie Hoffman MD Follow up in 3 week(s).    Specialties:  Pulmonary Disease, Sleep Medicine  Why:  with CXR  Contact information:  [address] [facility] [address]  [place] [address]  [phone]             Brandin Bolton MD .    Specialty:  Internal Medicine  Why:  1 week  Contact information:  [address] [facility] [address]  [facility] [address]  [phone]             [facility] GASTROENTEROLOGY Follow up.    Specialty:  Gastroenterology  Why:  as needed  Contact information:  [address] [facility]  [facility] [id number] [phone]  Additional information:  Phone Number: [phone]           urology Follow up.    Why:  as previously planned or in 1 week           orthopedics Follow up.                 Contact information for after-discharge care     Destination     [facility] .    Service:  Skilled Nursing  Contact information:  [address]  [facility] [address] [phone]                         INR in 1 week and pharmacy to dose coumadin.     Future Appointments   Date Time Provider Department Center   2/28/2020 10:30 AM [facility] None   3/24/2020 10:00 AM Brandin Bolton MD [facility] None   4/3/2020 10:30 AM LAB CHAIR [facility]   4/10/2020 10:20 AM VITALS ONLY [facility]   4/10/2020 10:40 AM George Phelan II, MD [facility]   6/5/2020 10:30 AM LAB CHAIR [facility]   6/12/2020 10:00 AM VITALS ONLY [facility]   6/12/2020 10:20 AM George Phelan II, MD [facility]   7/29/2020 12:00 PM Klarissa  James GREEN MD [facility] None        FAYE George  02/19/20  1:03 PM

## 2020-02-19 NOTE — PROGRESS NOTES
Continued Stay Note  [facility]     Patient Name: Jose Hurtado  MRN: [id number]  Today's Date: 2/19/2020    Admit Date: 2/10/2020    Discharge Plan     Row Name 02/19/20 0905       Plan    Plan  Marysol Huntley pending Pre-cert    Patient/Family in Agreement with Plan  yes    Plan Comments  Outbound call to Pao/Jessica. Pre-cert is still pending. Patient can DC anytime once pre-cert is obtained. Outbound call to Anabella/Sherri. She will check on pre-cert. Gogo Ray RN        Discharge Codes    No documentation.       Expected Discharge Date and Time     Expected Discharge Date Expected Discharge Time    Feb 15, 2020             Gogo Ray RN

## 2020-02-28 ENCOUNTER — OFFICE VISIT (OUTPATIENT)
Dept: INTERNAL MEDICINE | Age: 70
End: 2020-02-28

## 2020-02-28 ENCOUNTER — TELEPHONE (OUTPATIENT)
Dept: INTERNAL MEDICINE | Age: 70
End: 2020-02-28

## 2020-02-28 VITALS
OXYGEN SATURATION: 98 % | SYSTOLIC BLOOD PRESSURE: 112 MMHG | WEIGHT: 182 LBS | HEIGHT: 77 IN | HEART RATE: 70 BPM | BODY MASS INDEX: 21.49 KG/M2 | TEMPERATURE: 96.7 F | DIASTOLIC BLOOD PRESSURE: 70 MMHG

## 2020-02-28 DIAGNOSIS — K31.84 GASTROPARESIS: Chronic | ICD-10-CM

## 2020-02-28 DIAGNOSIS — C15.9 ADENOCARCINOMA OF ESOPHAGUS (HCC): Primary | ICD-10-CM

## 2020-02-28 DIAGNOSIS — D68.61 ANTI-PHOSPHOLIPID SYNDROME (HCC): ICD-10-CM

## 2020-02-28 DIAGNOSIS — J18.9 PNEUMONIA OF RIGHT LOWER LOBE DUE TO INFECTIOUS ORGANISM: Primary | ICD-10-CM

## 2020-02-28 LAB — INR PPP: 3.2 (ref 2–3)

## 2020-02-28 PROCEDURE — 96372 THER/PROPH/DIAG INJ SC/IM: CPT | Performed by: INTERNAL MEDICINE

## 2020-02-28 PROCEDURE — 99214 OFFICE O/P EST MOD 30 MIN: CPT | Performed by: INTERNAL MEDICINE

## 2020-02-28 RX ADMIN — CYANOCOBALAMIN 1000 MCG: 1000 INJECTION, SOLUTION INTRAMUSCULAR; SUBCUTANEOUS at 15:59

## 2020-02-28 NOTE — PATIENT INSTRUCTIONS
** IMPORTANT MESSAGE FROM DR. HEALY **    In our office, your satisfaction is VERY important to us.     You may receive a survey from [facility] by mail or E-mail for you to provide feedback about your visit. This information is invaluable for me to know what we can do to improve our services.     I ask that you please take a few minutes to complete the survey and let us know how we are doing in serving your needs. (You may receive the survey more than once for multiple visits)    Thank You !    Dr. Healy & Staff    _________________________________________________________________________________________________________________________      ** ADDITIONAL INSTRUCTION / REMINDERS FROM DR. HEALY **

## 2020-02-28 NOTE — PROGRESS NOTES
"[facility]  RICHARDSON BOLTON M.D.    Jose LITTLE Hurtado / 70 y.o. / male  02/28/2020      CC:   Hospital Follow Up for Pneumonia      VITALS    Visit Vitals  /70   Pulse 70   Temp 96.7 °F (35.9 °C) (Temporal)   Ht 195.6 cm (77.01\")   Wt 82.6 kg (182 lb)   SpO2 98%   BMI 21.58 kg/m²       BP Readings from Last 3 Encounters:   02/28/20 112/70   02/19/20 128/67   02/07/20 138/68     Wt Readings from Last 3 Encounters:   02/28/20 82.6 kg (182 lb)   02/19/20 85.1 kg (187 lb 9.6 oz)   02/07/20 98.5 kg (217 lb 1.6 oz)      Body mass index is 21.58 kg/m².    HPI:     Date of admission/discharge: 2/10/20 - 2/19/20  Hospital: [facility]  Principle Dx: RLL pneumonia   Secondary Dx: Gastroparesis   History prior to hospitalization: worsening cough and sob  Evaluation/Treatment: chest xray RLL opacity noted with persistent right side pleural effusion. Gastric emptying study showed delayed emptying and started on Reglan. He was treated for pneumonia and transferred to rehab from where he will be discharged tomorrow.   Course: denies fever or chills, worsening cough or shortness of breath.  INR today is 3.2 for APS.     Patient Care Team:  Brandin Bolton MD as PCP - General (Internal Medicine)  George Phelan II, MD as Consulting Physician (Hematology and Oncology)  Jose Inman MD as Consulting Physician (Urology)  Mulugeta Millan MD as Consulting Physician (Gastroenterology)  Seth Randhawa MD as Consulting Physician (Ophthalmology)  ____________________________________________________________________    ASSESSMENT & PLAN:    Problem List Items Addressed This Visit        High    Pneumonia of right lower lobe due to infectious organism (CMS/HCC) - Primary    Current Assessment & Plan     Possible aspiration pneumonia vs CAP.   Doing better after antibiotic therapy.   · Will need follow-up chest xray on follow-up with me or with pulmonologist.          Relevant Medications    albuterol " sulfate  (90 Base) MCG/ACT inhaler    STIOLTO RESPIMAT 2.5-2.5 MCG/ACT aerosol solution inhaler       Medium    Anti-phospholipid syndrome (CMS/HCC) (Chronic)    Overview     DVT LLE in 2003 and 2006  Antiphospholipid syndrome  On life-long AC on warfarin.   GOAL INR : 2 -3          Current Assessment & Plan     Lab Results   Component Value Date    INR 3.20 (A) 02/27/2020    INR 2.37 (H) 02/19/2020    INR 2.09 (H) 02/18/2020    PROTIME 25.6 (H) 02/19/2020    PROTIME 23.2 (H) 02/18/2020    PROTIME 24.4 (H) 02/17/2020        Advised to take 2.5 mg warfarin through the weekend. HH to recheck Monday.          Relevant Medications    warfarin (COUMADIN) 5 MG tablet    Gastroparesis (Chronic)    Current Assessment & Plan     Started on Reglan in hospital for delayed gastric emptying and gastroparesis.   Discuss with Dr. Millan re: concerns of safety. Discussed aspiration precautions.          Relevant Medications    metoclopramide (REGLAN) 10 MG/10ML solution solution    pantoprazole (PROTONIX) 40 MG EC tablet         Orders Placed This Encounter   Procedures   • Protime-INR       Summary/Discussion:      Return for Next scheduled follow up.    ____________________________________________________________________    REVIEW OF SYSTEMS    Review of Systems  No fever or chills, worsening cough or worsening shortness of breath  No chest pain, PND/orthopnea, or worsening edema  No abdominal pain, melena or bleeding, no nausea     PHYSICAL EXAMINATION    Physical Exam  No acute distress   Lungs right base with decreased breath sounds ; no rale or wheezing  Cardiovascular: Normal rate, regular rhythm and normal heart sounds.     REVIEWED DATA:    Labs:   Office Visit on 02/28/2020   Component Date Value Ref Range Status   • INR 02/27/2020 3.20* 2 - 3 Final    2.5 M,W,F sunday 5 tuesday and thursday    No results displayed because visit has over 200 results.          Imaging:   Xr Chest 2 View    Result Date:  2/10/2020  Narrative: XR CHEST 2 VW-  HISTORY: Male who is 70 years-old,  short of breath, fever  TECHNIQUE: Frontal and lateral views of the chest  COMPARISON: 01/14/2020  FINDINGS: Heart size is normal. Aorta is tortuous. Pulmonary vasculature is unremarkable. Right pleural effusion appears similar to prior exam. Opacity at the right base appears increased suggesting atelectasis/infiltrate. Gastric pull-through changes are apparent. No pneumothorax. No acute osseous process.      Impression: Persistent right pleural effusion. Increased opacity at the right lower lung suggesting atelectasis/infiltrate, follow-up recommended.  This report was finalized on 2/10/2020 1:48 PM by Dr. Sumanth Boyer M.D.      Nm Gastric Emptying    Result Date: 2/14/2020  Narrative: NUCLEAR MEDICINE GASTRIC EMPTYING STUDY (FOUR HOUR PROTOCOL)  HISTORY: Male who is 70 years-old, with a history of retained gastric contents on esophagram.. Prior esophagectomy with gastric pull-up.  FINDINGS: Following the administration of 600 uCi of 99m technetium sulfur colloid mixed in a standard meal, routine analysis was performed. Imaging and measurements obtained 1 hour intervals through 4 hours.  Residual activity at 4 hours is 82%. Normal 4-hour residual is 10% or less. On the static images acquired from 0 to 240 minutes, there is no visually appreciable decrease in the distribution or the intensity of the radiotracer activity in the pulled up stomach.      Impression: Delayed gastric emptying.  This report was finalized on 2/14/2020 9:41 AM by Dr. Darian Howell M.D.      Fl Esophagram Complete    Result Date: 2/11/2020  Narrative: CLINICAL HISTORY: 70-year-old man dysphagia. Previous distal esophagectomy and gastric pull-through with upper thoracic esophagogastric anastomosis. Right pleural effusion and bilateral patchy atelectasis or infiltrate, along with recent CT scan demonstration of right pleural effusion, demonstrated on the CT chest  study of 01/15/2020.  EXAM: BARIUM SWALLOW ESOPHAGRAM DATED 02/11/2020  FINDINGS: The preliminary PA erect chest radiographs are compared to CT chest multiplanar sections of 01/15/2020 and with erect AP and lateral chest radiographs of 02/10/2020 at 1318 hours. Blunting of right costophrenic angle, trace blunting of left costophrenic angle, opacity at the medial right lower chest compatible with gastric pull-through, and patchy bilateral perihilar to basilar atelectasis or infiltrates are again demonstrated. Borderline to mild cardiomegaly and some aortic uncoiling are again demonstrated. Multiple right upper rib deformities, apparently chronic, are again demonstrated. No pneumothorax or acute change in bony thorax is seen. Metallic plate and screws at the lower cervical spine are demonstrated. Monitoring lead wires are present.  The patient was reportedly unsafe to stand unsupported, and the current exam was performed with patient on the x-ray table in multiple projections and with the table and patient variably horizontal to steep reverse Trendelenburg orientation. The patient ingested thin barium liquid mixture and water for the current exam. Rapid sequence imaging of the neck in lateral and AP projection as the patient ingested thin barium liquid mixture demonstrated laryngeal penetration without aspiration. There is low normal AP diameter of mid to upper cervical esophagus with metallic plate and screws at C6-C7 levels possibly responsible for a trace of effacement of posterior aspect of cervical esophagus. The transverse caliber of cervical esophagus is within normal limits. The short remaining upper thoracic segment of esophagus has normal distensibility. The esophagogastric anastomosis is at the level of the manubrium and has luminal diameter of at least 1.9 cm or greater. A large amount of food material appears to be present in the stomach. I cannot confirm or exclude bezoar as a component of this food  material. With the patient in left posterior oblique to left lateral decubitus position there is most optimal demonstrated emptying of some of the gastric barium from the intrathoracic stomach into the distal subdiaphragmatic antrum, and from there promptly into and through the duodenum. Also in this position, the patient appears at greatest vulnerability to gastroesophageal reflux at the anastomosis. Modest reflux into the remaining upper thoracic esophagus was observed even with mild reverse Trendelenburg orientation of table and patient, with the patient in left lateral decubitus position.  A total of 2 minutes 50 seconds fluoroscopy was used. A total of 2 digital overhead radiographs and 103 digital fluoroscopic spot image radiographs were acquired.  CONCLUSION: Laryngeal penetration without aspiration. Large amount of food material versus food material or bezoar in the stomach. Prompt passage of some of the ingested barium through the esophagus, stomach and duodenum, dependent on patient position and orientation as discussed above. Gastroesophageal reflux at the upper thoracic esophagogastric anastomosis was observed.  This report was finalized on 2/11/2020 2:49 PM by Dr. George Veras M.D.         Medical Tests:        Summary of old records / correspondence / consultant report:   DC summary re: issues addressed on HPI    Request outside records:       ALLERGIES  No Known Allergies     MEDICATIONS   Current Outpatient Medications   Medication Sig Dispense Refill   • acetaminophen (TYLENOL) 325 MG tablet Take 2 tablets by mouth Every 4 (Four) Hours As Needed for Mild Pain .     • albuterol sulfate  (90 Base) MCG/ACT inhaler Inhale 1 puff Every 4 (Four) Hours As Needed for Wheezing.     • ferrous sulfate 325 (65 FE) MG tablet Take 2 tablets by mouth Daily With Breakfast. 90 tablet 0   • furosemide (LASIX) 20 MG tablet Take 1 tablet by mouth Daily. 30 tablet 0   • melatonin 1 MG tablet Take 1 tablet by  mouth At Night As Needed for Sleep. 30 tablet    • metoclopramide (REGLAN) 10 MG/10ML solution solution Take 5 mL by mouth 4 (Four) Times a Day Before Meals & at Bedtime. 600 mL 0   • metoprolol tartrate (LOPRESSOR) 25 MG tablet Take 0.5 tablets by mouth Every 12 (Twelve) Hours. 60 tablet 0   • pantoprazole (PROTONIX) 40 MG EC tablet Take 1 tablet by mouth 2 (Two) Times a Day Before Meals. 60 tablet 0   • PARoxetine (PAXIL) 40 MG tablet Take 1 tablet by mouth Every Morning. 30 tablet 3   • potassium chloride (K-DUR) 10 MEQ CR tablet Take 1 tablet by mouth Daily. Only while taking furosemide. 30 tablet 0   • pramipexole (MIRAPEX) 1.5 MG tablet Take 1 tablet by mouth 2 (Two) Times a Day. 180 tablet 1   • STIOLTO RESPIMAT 2.5-2.5 MCG/ACT aerosol solution inhaler Inhale 2 puffs Daily.     • tamsulosin (FLOMAX) 0.4 MG capsule 24 hr capsule Take 1 capsule by mouth Every Night.     • traZODone (DESYREL) 100 MG tablet Take 1 tablet by mouth Every Night for 180 days. 30 tablet 5   • warfarin (COUMADIN) 5 MG tablet 2.5 mg Monday, Wednesday, Friday, Sunday ; 5 mg on Tuesday, Thursday, Saturday 90 tablet 2   • docusate sodium 100 MG capsule Take 100 mg by mouth 2 (Two) Times a Day.       Current Facility-Administered Medications   Medication Dose Route Frequency Provider Last Rate Last Dose   • cyanocobalamin injection 1,000 mcg  1,000 mcg Intramuscular Q28 Days Brandin Bolton MD   1,000 mcg at 02/28/20 1559       Current outpatient and discharge medications have been reconciled for the patient.  Reviewed by: Brandin Bolton MD       Critical access hospital:     The following portions of the patient's history were reviewed and updated as appropriate: Allergies / Current Medications / Past Medical History / Surgical History / Social History / Family History    PROBLEM LIST   Patient Active Problem List   Diagnosis   • Adenocarcinoma of esophagus (CMS/HCC)   • Adenomatous polyp of colon   • Malignant neoplasm of prostate (CMS/HCC)   • RLS (restless legs  syndrome)   • Depression   • Hypertension   • Anti-phospholipid syndrome (CMS/HCC)   • Anemia   • Insomnia due to other mental disorder   • Peripheral polyneuropathy   • B12 deficiency   • Postsurgical malabsorption   • Shortness of breath   • Lymphedema   • Pneumonia of right lower lobe due to infectious organism (CMS/HCC)   • Iron deficiency   • Elevated troponin   • Gastroparesis       PAST MEDICAL HISTORY  Past Medical History:   Diagnosis Date   • Anemia    • Anti-phospholipid syndrome (CMS/HCC)     On lifelong anticoagulation therapy   • Bladder disorder     LEAKAGE   ON MED  wers pads   • Community acquired pneumonia     HISTORY OF IN 2014   • Deep venous thrombosis (CMS/HCC) 2006, 2008    Left lower extremity multiple   • Depression    • Esophageal carcinoma (CMS/HCC) 12/31/2014    had chemo and radiation prior surgery   • Hemorrhoids    • HH (hiatus hernia)    • History of atrial fibrillation 2015    ONE EPISODE WHILE HOSPITALIZED   • History of kidney stones    • History of nephrolithiasis    • History of pancreatitis     PT STATES MANY YEARS AGO   • History of radiation therapy    • Hypertension    • Long-term (current) use of anticoagulants, INR goal 2.0-3.0    • Lymphedema    • Malignant neoplasm of prostate (CMS/HCC)    • Other hyperlipidemia 1/30/2018 January 30, 2018 lipid panel risk 12.8%   • Restless legs syndrome    • Shortness of breath    • Squamous carcinoma     on the head       SURGICAL HISTORY  Past Surgical History:   Procedure Laterality Date   • APPENDECTOMY  1950   • BRONCHOSCOPY      (Diagnostic)   • CATARACT EXTRACTION Bilateral 2014   • COLONOSCOPY  12/15/2014    Complete / Description: EH, IH, torts, stool, follow-up colonoscopy due in 5 years.   • COLONOSCOPY N/A 6/13/2017    non-thrombosed external hemorrhoids, normal examined ileum, IH   • COLONOSCOPY N/A 2/26/2019    Procedure: COLONOSCOPY with Cold Polypectomy;  Surgeon: Mulugeta Millan MD;  Location: [facility] ENDOSCOPY;   Service: Gastroenterology   • CYSTOSCOPY W/ LASER LITHOTRIPSY     • ENDOSCOPY N/A 6/13/2017    Procedure: ESOPHAGOGASTRODUODENOSCOPY WITH COLD BIOPSY;  Surgeon: Lake Gonzalez MD;  Location: [facility] ENDOSCOPY;  Service:    • ESOPHAGECTOMY      April 2015, stage IIB esophageal carcinoma, sub-total resection.   • ESOPHAGECTOMY      Esophagectomy Subtotal Andrea Joe Procedure   • EXCISION LESION  08/2012    Removal of Squamous Cell CA on Head   • HAMMER TOE REPAIR  09/2014    Hammertoe Operation (Each Toe), 10/2014   • HAMMER TOE REPAIR Left 10/3/2017    Procedure: Left second third and fourth distal interphalangeal joint resection with flexor tenotomy;  Surgeon: Mulugeta Lira MD;  Location: [facility] OR OSC;  Service:    • HERNIA REPAIR      incisional   • JEJUNOSTOMY      Laparoscopic   • JEJUNOSTOMY      tube removal    • KNEE SURGERY Bilateral 1967, 1973, 1981   • PATELLA SURGERY Left     removed   • PILONIDAL CYST / SINUS EXCISION     • OR TOTAL KNEE ARTHROPLASTY Right 3/26/2018    Procedure: TOTAL KNEE ARTHROPLASTY;  Surgeon: Renny Solis MD;  Location: [facility] MAIN OR;  Service: Orthopedics   • PROSTATECTOMY  2010   • PYLOROPLASTY     • SPINAL FUSION  02/1998    C 5,6   • TONSILLECTOMY  1955   • TONSILLECTOMY     • UPPER GASTROINTESTINAL ENDOSCOPY  12/15/2014    LA Grade D esophagitis, Pardo's, HH, multiple duodenal ulcers   • VENTRAL/INCISIONAL HERNIA REPAIR N/A 4/14/2016    Procedure: VENTRAL/INCISIONAL HERNIA REPAIR, open, with mesh, and component separation;  Surgeon: Darren Rivas MD;  Location: [facility] OR;  Service:        SOCIAL HISTORY  Social History     Socioeconomic History   • Marital status:      Spouse name: Lena   • Number of children: 0   • Years of education: College   • Highest education level: Not on file   Occupational History   • Occupation: Retired      Comment: Retired 2014   Tobacco Use   • Smoking status: Former Smoker      Packs/day: 2.00     Years: 3.00     Pack years: 6.00     Types: Cigarettes     Last attempt to quit:      Years since quittin.1   • Smokeless tobacco: Never Used   • Tobacco comment: no caffeine    Substance and Sexual Activity   • Alcohol use: Yes     Frequency: 2-3 times a week     Comment: 2-3 x per week   • Drug use: No   • Sexual activity: Defer   Social History Narrative    self-employed        FAMILY HISTORY  Family History   Problem Relation Age of Onset   • Cerebral aneurysm Mother         cerebral artery aneurysm ( age 56)   • Prostate cancer Brother 68   • Anxiety disorder Father    • Suicide Attempts Father          of suicide   • Cancer Father         bladder   • No Known Problems Brother    • Colon cancer Neg Hx    • Esophageal cancer Neg Hx    • Dementia Neg Hx          **Dragon Disclaimer:   Much of this encounter note is an electronic transcription/translation of spoken language to printed text. The electronic translation of spoken language may permit erroneous, or at times, nonsensical words or phrases to be inadvertently transcribed. Although I have reviewed the note for such errors, some may still exist.       Template created by Moose Bolton MD

## 2020-02-28 NOTE — ASSESSMENT & PLAN NOTE
Possible aspiration pneumonia vs CAP.   Doing better after antibiotic therapy.   · Will need follow-up chest xray on follow-up with me or with pulmonologist.

## 2020-02-28 NOTE — PROGRESS NOTES
INR was discussed with patient during office visit today.   RECORD ON INR FLOWSHEET.     Currently takin.5 mg everyday, except 5 mg on Tuesday, Thur, and Sat.     INR is above therapeutic level. Take 2.5 mg today, Saturday and . Scheduled for  to recheck INR Monday.   Lab Results       Component                Value               Date                       INR                      3.20 (A)            2020                 INR                      2.37 (H)            2020                 INR                      2.09 (H)            2020

## 2020-02-28 NOTE — ASSESSMENT & PLAN NOTE
Lab Results   Component Value Date    INR 3.20 (A) 02/27/2020    INR 2.37 (H) 02/19/2020    INR 2.09 (H) 02/18/2020    PROTIME 25.6 (H) 02/19/2020    PROTIME 23.2 (H) 02/18/2020    PROTIME 24.4 (H) 02/17/2020        Advised to take 2.5 mg warfarin through the weekend. HH to recheck Monday.

## 2020-02-28 NOTE — ASSESSMENT & PLAN NOTE
Started on Reglan in hospital for delayed gastric emptying and gastroparesis.   Discuss with Dr. Millan re: concerns of safety. Discussed aspiration precautions.

## 2020-03-02 ENCOUNTER — TELEPHONE (OUTPATIENT)
Dept: GASTROENTEROLOGY | Facility: CLINIC | Age: 70
End: 2020-03-02

## 2020-03-02 NOTE — TELEPHONE ENCOUNTER
----- Message from Jennifer Pleitez sent at 3/2/2020  9:53 AM EST -----  Regarding: metoclopramide  Contact: [phone]  QUESTIONS ABOUT MED

## 2020-03-02 NOTE — TELEPHONE ENCOUNTER
Not clear if his symptoms are related to the Reglan, however, would stop the medication for the present and see if the handshaking resolves.  Pending his response, can follow-up in the office with myself or nurse practitioner to discuss other options for treatment.

## 2020-03-02 NOTE — TELEPHONE ENCOUNTER
"Call to pt.  Concerned to be continuing reglan 2nd possible side effects.  Notes occasional \"shaking hands\".  States GI symptoms well controlled.      Concern to Dr Millan.   "

## 2020-03-03 LAB — INR PPP: 2.9 (ref 2–3)

## 2020-03-03 NOTE — TELEPHONE ENCOUNTER
Call to pt.  Advise per Dr Millan note.  Verb understanding.  Has appt with JONATHAN Hale on 3/6.

## 2020-03-04 ENCOUNTER — TELEPHONE (OUTPATIENT)
Dept: INTERNAL MEDICINE | Age: 70
End: 2020-03-04

## 2020-03-04 ENCOUNTER — ANTICOAGULATION VISIT (OUTPATIENT)
Dept: INTERNAL MEDICINE | Age: 70
End: 2020-03-04

## 2020-03-04 DIAGNOSIS — D68.61 ANTI-PHOSPHOLIPID SYNDROME (HCC): ICD-10-CM

## 2020-03-04 NOTE — TELEPHONE ENCOUNTER
Jody from [facility] called stating that they faxed over patients PTINR and has not heard if it was received or not.  Jody can be called back at [phone]

## 2020-03-05 ENCOUNTER — TELEPHONE (OUTPATIENT)
Dept: INTERNAL MEDICINE | Age: 70
End: 2020-03-05

## 2020-03-05 NOTE — TELEPHONE ENCOUNTER
Left message for pt to callback to schedule annual medicare wellness visit. Ok for hub to schedule.

## 2020-03-06 ENCOUNTER — TELEPHONE (OUTPATIENT)
Dept: INTERNAL MEDICINE | Age: 70
End: 2020-03-06

## 2020-03-06 ENCOUNTER — OFFICE VISIT (OUTPATIENT)
Dept: GASTROENTEROLOGY | Facility: CLINIC | Age: 70
End: 2020-03-06

## 2020-03-06 VITALS
BODY MASS INDEX: 22.98 KG/M2 | DIASTOLIC BLOOD PRESSURE: 44 MMHG | HEIGHT: 77 IN | TEMPERATURE: 97.6 F | WEIGHT: 194.6 LBS | SYSTOLIC BLOOD PRESSURE: 82 MMHG

## 2020-03-06 DIAGNOSIS — K21.9 GASTROESOPHAGEAL REFLUX DISEASE, ESOPHAGITIS PRESENCE NOT SPECIFIED: ICD-10-CM

## 2020-03-06 DIAGNOSIS — C15.9 ADENOCARCINOMA OF ESOPHAGUS (HCC): ICD-10-CM

## 2020-03-06 DIAGNOSIS — K31.84 GASTROPARESIS: Primary | Chronic | ICD-10-CM

## 2020-03-06 DIAGNOSIS — D12.6 ADENOMATOUS POLYP OF COLON, UNSPECIFIED PART OF COLON: ICD-10-CM

## 2020-03-06 DIAGNOSIS — R13.10 DYSPHAGIA, UNSPECIFIED TYPE: ICD-10-CM

## 2020-03-06 PROCEDURE — 99214 OFFICE O/P EST MOD 30 MIN: CPT | Performed by: NURSE PRACTITIONER

## 2020-03-06 NOTE — PROGRESS NOTES
Chief Complaint   Patient presents with   • Follow-up     hospital follow up       Jose Hurtado is a  70 y.o. male here for a hospital follow up visit for GERD.    HPI  70-year-old male presents today accompanied by his wife for hospital follow-up for abnormal esophagram with GERD and gastroparesis.  He is a patient of Dr. Millan.  He was seen by our service at [facility] from 2/10 through 2/19/2020.  We were originally consulted for abnormal esophagram.  He had an esophagram done that showed:    CONCLUSION: Laryngeal penetration without aspiration. Large amount of  food material versus food material or bezoar in the stomach. Prompt  passage of some of the ingested barium through the esophagus, stomach  and duodenum, dependent on patient position and orientation as discussed  above. Gastroesophageal reflux at the upper thoracic esophagogastric  anastomosis was observed.   Patient was started on Protonix 40 mg twice daily and Reglan 5 mg before meals.  He started developing worsening of his hand tremors so he stopped the Reglan.  Since then he admits he has been doing really well.  His wife tells me however that every time he eats he still coughs a ton.  She tells me he really seems to have  trouble swallowing.  He has history of esophageal cancer from the past.  His wife tells me that he used to get speech therapy for his swallowing but he has not had that in at least for 5 years now.  Patient denies any abdominal pain, nausea and vomiting, diarrhea, constipation, rectal bleeding or melena.  He denies any breakthrough reflux at this time.  He did have a gastric emptying study done in the hospital which was very delayed.    Past Medical History:   Diagnosis Date   • Anemia    • Anti-phospholipid syndrome (CMS/HCC)     On lifelong anticoagulation therapy   • Bladder disorder     LEAKAGE   ON MED  wers pads   • Community acquired pneumonia     HISTORY OF IN 2014   • Deep venous thrombosis  (CMS/HCC) 2006, 2008    Left lower extremity multiple   • Depression    • Esophageal carcinoma (CMS/HCC) 12/31/2014    had chemo and radiation prior surgery   • Hemorrhoids    • HH (hiatus hernia)    • History of atrial fibrillation 2015    ONE EPISODE WHILE HOSPITALIZED   • History of kidney stones    • History of nephrolithiasis    • History of pancreatitis     PT STATES MANY YEARS AGO   • History of radiation therapy    • Hypertension    • Long-term (current) use of anticoagulants, INR goal 2.0-3.0    • Lymphedema    • Malignant neoplasm of prostate (CMS/HCC)    • Other hyperlipidemia 1/30/2018 January 30, 2018 lipid panel risk 12.8%   • Restless legs syndrome    • Shortness of breath    • Squamous carcinoma     on the head       Past Surgical History:   Procedure Laterality Date   • APPENDECTOMY  1950   • BRONCHOSCOPY      (Diagnostic)   • CATARACT EXTRACTION Bilateral 2014   • COLONOSCOPY  12/15/2014    Complete / Description: EH, IH, torts, stool, follow-up colonoscopy due in 5 years.   • COLONOSCOPY N/A 6/13/2017    non-thrombosed external hemorrhoids, normal examined ileum, IH   • COLONOSCOPY N/A 2/26/2019    Procedure: COLONOSCOPY with Cold Polypectomy;  Surgeon: Mulugeta Millan MD;  Location: [facility] ENDOSCOPY;  Service: Gastroenterology   • CYSTOSCOPY W/ LASER LITHOTRIPSY     • ENDOSCOPY N/A 6/13/2017    Procedure: ESOPHAGOGASTRODUODENOSCOPY WITH COLD BIOPSY;  Surgeon: Lake Gonzalez MD;  Location: [facility] ENDOSCOPY;  Service:    • ESOPHAGECTOMY      April 2015, stage IIB esophageal carcinoma, sub-total resection.   • ESOPHAGECTOMY      Esophagectomy Subtotal Andrea Joe Procedure   • EXCISION LESION  08/2012    Removal of Squamous Cell CA on Head   • HAMMER TOE REPAIR  09/2014    Hammertoe Operation (Each Toe), 10/2014   • HAMMER TOE REPAIR Left 10/3/2017    Procedure: Left second third and fourth distal interphalangeal joint resection with flexor tenotomy;  Surgeon: Mulugeta Lira MD;   Location: [facility];  Service:    • HERNIA REPAIR      incisional   • JEJUNOSTOMY      Laparoscopic   • JEJUNOSTOMY      tube removal    • KNEE SURGERY Bilateral , ,    • PATELLA SURGERY Left     removed   • PILONIDAL CYST / SINUS EXCISION     • NC TOTAL KNEE ARTHROPLASTY Right 3/26/2018    Procedure: TOTAL KNEE ARTHROPLASTY;  Surgeon: Renny Solis MD;  Location: [facility];  Service: Orthopedics   • PROSTATECTOMY     • PYLOROPLASTY     • SPINAL FUSION  1998    C 5,6   • TONSILLECTOMY     • TONSILLECTOMY     • UPPER GASTROINTESTINAL ENDOSCOPY  12/15/2014    LA Grade D esophagitis, Pardo's, HH, multiple duodenal ulcers   • VENTRAL/INCISIONAL HERNIA REPAIR N/A 2016    Procedure: VENTRAL/INCISIONAL HERNIA REPAIR, open, with mesh, and component separation;  Surgeon: Darren Rivas MD;  Location: [facility];  Service:        Scheduled Meds:    cyanocobalamin 1,000 mcg Intramuscular Q28 Days       Continuous Infusions:     PRN Meds:.    No Known Allergies    Social History     Socioeconomic History   • Marital status:      Spouse name: Lena   • Number of children: 0   • Years of education: College   • Highest education level: Not on file   Occupational History   • Occupation: Retired      Comment: Retired    Tobacco Use   • Smoking status: Former Smoker     Packs/day: 2.00     Years: 3.00     Pack years: 6.00     Types: Cigarettes     Last attempt to quit: 1974     Years since quittin.2   • Smokeless tobacco: Never Used   • Tobacco comment: no caffeine    Substance and Sexual Activity   • Alcohol use: Yes     Frequency: 2-3 times a week     Comment: 2-3 x per week   • Drug use: No   • Sexual activity: Defer   Social History Narrative    self-employed        Family History   Problem Relation Age of Onset   • Cerebral aneurysm Mother         cerebral artery aneurysm ( age 56)   • Prostate cancer Brother 68   • Anxiety  disorder Father    • Suicide Attempts Father          of suicide   • Cancer Father         bladder   • No Known Problems Brother    • Pancreatic cancer Nephew    • Colon cancer Neg Hx    • Esophageal cancer Neg Hx    • Dementia Neg Hx        Review of Systems   Constitutional: Negative for appetite change, chills, diaphoresis, fatigue, fever and unexpected weight change.   HENT: Positive for trouble swallowing. Negative for nosebleeds, postnasal drip, sore throat and voice change.    Respiratory: Positive for cough. Negative for choking, chest tightness, shortness of breath and wheezing.    Cardiovascular: Positive for leg swelling. Negative for chest pain and palpitations.   Gastrointestinal: Negative for abdominal distention, abdominal pain, anal bleeding, blood in stool, constipation, diarrhea, nausea, rectal pain and vomiting.   Endocrine: Negative for polydipsia, polyphagia and polyuria.   Musculoskeletal: Negative for gait problem.   Skin: Negative for rash and wound.   Allergic/Immunologic: Negative for food allergies.   Neurological: Negative for dizziness, speech difficulty and light-headedness.   Psychiatric/Behavioral: Negative for confusion, self-injury, sleep disturbance and suicidal ideas.       Vitals:    20 1431   BP: (!) 82/44   Temp: 97.6 °F (36.4 °C)       Physical Exam   Constitutional: He is oriented to person, place, and time. He appears well-developed and well-nourished. He does not appear ill. No distress.   HENT:   Head: Normocephalic.   Eyes: Pupils are equal, round, and reactive to light.   Cardiovascular: Normal rate, regular rhythm and normal heart sounds.   Pulmonary/Chest: Effort normal and breath sounds normal.   Abdominal: Soft. Bowel sounds are normal. He exhibits no distension and no mass. There is no hepatosplenomegaly. There is no tenderness. There is no rebound and no guarding. No hernia.   Musculoskeletal: Normal range of motion.   Ambulates with a cane    Neurological: He is alert and oriented to person, place, and time.   Skin: Skin is warm and dry.   Psychiatric: He has a normal mood and affect. His speech is normal and behavior is normal. Judgment normal.       No radiology results for the last 7 days     Assessment and plan     1. Gastroparesis  - FL Video Swallow With Speech; Future    2. Gastroesophageal reflux disease, esophagitis presence not specified  - FL Video Swallow With Speech; Future    3. Dysphagia, unspecified type  - FL Video Swallow With Speech; Future    4. Adenocarcinoma of esophagus (CMS/HCC)  - FL Video Swallow With Speech; Future    5. Adenomatous polyp of colon, unspecified part of colon    Repeat blood pressure was 88/50.  I did advise the patient and his wife to discuss his low blood pressure with his internist as well as his cardiologist.  They plan on discussing this Monday.  I did review his hospital records with him today.  He does seem to be having some issues with swallowing and a cough.  I will go ahead and order a video swallow study.  I want him to continue the Protonix 40 mg twice a day.  Patient to call the office with any issues.  Continue to eat small more frequent meals.  Patient to follow-up with Dr. Millan in 1 month.

## 2020-03-06 NOTE — TELEPHONE ENCOUNTER
PER LUCIANA AT [facility], PATIENT HAS DECIDED HE NO LONGER WANTS HOME HEALTH AND WOULD LIKE TO DO OUTPATIENT THERAPY. WOULD LIKE ORDERS SENT TO [facility].    LUCIANA CAN BE REACHED [phone].

## 2020-03-06 NOTE — TELEPHONE ENCOUNTER
PT CALLED HAS CONCERNS ABOUT MEDICATIONS.    REQUESTED AN APPT SOONER THAN 03/24. ADVISED THERE IS NONE AT THIS TIME, OFFERED TO PUT HIM ON WAIT LIST. HE'D LIKE A CALL IF FOR SOME REASON HE COULD BE WORKED IN WEEK OF 03/09.    PLEASE ADVISE.

## 2020-03-09 ENCOUNTER — TELEPHONE (OUTPATIENT)
Dept: CARDIOLOGY | Facility: CLINIC | Age: 70
End: 2020-03-09

## 2020-03-09 ENCOUNTER — OFFICE VISIT (OUTPATIENT)
Dept: INTERNAL MEDICINE | Age: 70
End: 2020-03-09

## 2020-03-09 VITALS
WEIGHT: 199 LBS | SYSTOLIC BLOOD PRESSURE: 110 MMHG | DIASTOLIC BLOOD PRESSURE: 50 MMHG | OXYGEN SATURATION: 98 % | HEART RATE: 73 BPM | BODY MASS INDEX: 23.5 KG/M2 | HEIGHT: 77 IN | TEMPERATURE: 98.2 F

## 2020-03-09 DIAGNOSIS — J18.9 PNEUMONIA OF RIGHT LOWER LOBE DUE TO INFECTIOUS ORGANISM: Primary | ICD-10-CM

## 2020-03-09 PROCEDURE — 99213 OFFICE O/P EST LOW 20 MIN: CPT | Performed by: INTERNAL MEDICINE

## 2020-03-09 RX ORDER — POTASSIUM CHLORIDE 750 MG/1
CAPSULE, EXTENDED RELEASE ORAL
COMMUNITY
Start: 2020-03-01 | End: 2020-05-04 | Stop reason: SDUPTHER

## 2020-03-09 NOTE — PROGRESS NOTES
[facility] [place] [facility]  RICHARDSON BOLTON M.D.      oJse FITCH Hurtado / 70 y.o. / male  03/09/2020      CC:  Main reason(s) for today's visit: medication concerns      HPI:     Wants to go over all his medications to review what they are for and whether he really needs them.   Also wants to change over to outpatient PT and discontinue HH/in-home PT.   Recovering from recent likely aspiration pneumonia. Denies fever, increased cough or shortness of breath.       Patient Care Team:  Brandin Bolton MD as PCP - General (Internal Medicine)  Tapan, George THORPE II, MD as Consulting Physician (Hematology and Oncology)  Jose Inman MD as Consulting Physician (Urology)  Mulugeta Millan MD as Consulting Physician (Gastroenterology)  Seth Randhawa MD as Consulting Physician (Ophthalmology)    ASSESSMENT & PLAN:    1. Pneumonia of right lower lobe due to infectious organism (CMS/[facility])      No orders of the defined types were placed in this encounter.    No orders of the defined types were placed in this encounter.       Summary/Discussion:  He wants to go to outpatient PT. Okay.   Reviewed all his medications/indications for them. He wants to continue all current medications.   Follow-up 3/24/20. Will needed follow-up chest xray at that time.       Next Appointment with me: 3/24/2020    Return for Next scheduled follow up.    ____________________________________________________________________    MEDICATIONS  Current Outpatient Medications   Medication Sig Dispense Refill   • albuterol sulfate  (90 Base) MCG/ACT inhaler Inhale 1 puff Every 4 (Four) Hours As Needed for Wheezing.     • ferrous sulfate 325 (65 FE) MG tablet Take 2 tablets by mouth Daily With Breakfast. 90 tablet 0   • furosemide (LASIX) 20 MG tablet Take 1 tablet by mouth Daily. 30 tablet 0   • metoprolol tartrate (LOPRESSOR) 25 MG tablet Take 0.5 tablets by mouth Every 12 (Twelve) Hours. 60 tablet 0   • pantoprazole (PROTONIX) 40 MG EC tablet Take 1 tablet  "by mouth 2 (Two) Times a Day Before Meals. 60 tablet 0   • PARoxetine (PAXIL) 40 MG tablet Take 1 tablet by mouth Every Morning. 30 tablet 3   • potassium chloride (MICRO-K) 10 MEQ CR capsule      • pramipexole (MIRAPEX) 1.5 MG tablet Take 1 tablet by mouth 2 (Two) Times a Day. 180 tablet 1   • STIOLTO RESPIMAT 2.5-2.5 MCG/ACT aerosol solution inhaler Inhale 2 puffs Daily.     • tamsulosin (FLOMAX) 0.4 MG capsule 24 hr capsule Take 1 capsule by mouth Every Night.     • traZODone (DESYREL) 100 MG tablet Take 1 tablet by mouth Every Night for 180 days. 30 tablet 5   • warfarin (COUMADIN) 5 MG tablet 2.5 mg Monday, Wednesday, Friday, Sunday ; 5 mg on Tuesday, Thursday, Saturday 90 tablet 2     Current Facility-Administered Medications   Medication Dose Route Frequency Provider Last Rate Last Dose   • cyanocobalamin injection 1,000 mcg  1,000 mcg Intramuscular Q28 Days Brandin Bolton MD   1,000 mcg at 02/28/20 1559          ____________________________________________________________________      REVIEW OF SYSTEMS    Review of Systems  No fever  No worsening cough or shortness of breath     VITALS    Visit Vitals  /50   Pulse 73   Temp 98.2 °F (36.8 °C)   Ht 195.6 cm (77\")   Wt 90.3 kg (199 lb)   SpO2 98%   BMI 23.60 kg/m²       BP Readings from Last 3 Encounters:   03/09/20 110/50   03/06/20 (!) 82/44   02/28/20 112/70     Wt Readings from Last 3 Encounters:   03/09/20 90.3 kg (199 lb)   03/06/20 88.3 kg (194 lb 9.6 oz)   02/28/20 82.6 kg (182 lb)      Body mass index is 23.6 kg/m².    PHYSICAL EXAMINATION    Physical Exam  Looks overall improved  Cardiovascular: Normal rate, regular rhythm and normal heart sounds.   Pulmonary/Chest: Effort normal and breath sounds normal.      REVIEWED DATA:    Labs:         Imaging:         Medical Tests:         Summary of old records / correspondence / consultant report:          Request outside records:         ALLERGIES  No Known Allergies     PFSH:     The following portions " of the patient's history were reviewed and updated as appropriate: Allergies / Current Medications / Past Medical History / Surgical History / Social History / Family History    PROBLEM LIST   Patient Active Problem List   Diagnosis   • Adenocarcinoma of esophagus (CMS/HCC)   • Adenomatous polyp of colon   • Malignant neoplasm of prostate (CMS/HCC)   • RLS (restless legs syndrome)   • Depression   • Hypertension   • Anti-phospholipid syndrome (CMS/HCC)   • Anemia   • Insomnia due to other mental disorder   • Peripheral polyneuropathy   • B12 deficiency   • Postsurgical malabsorption   • Shortness of breath   • Lymphedema   • Pneumonia of right lower lobe due to infectious organism (CMS/HCC)   • Iron deficiency   • Elevated troponin   • Gastroparesis       PAST MEDICAL HISTORY  Past Medical History:   Diagnosis Date   • Anemia    • Anti-phospholipid syndrome (CMS/HCC)     On lifelong anticoagulation therapy   • Bladder disorder     LEAKAGE   ON MED  wers pads   • Community acquired pneumonia     HISTORY OF IN 2014   • Deep venous thrombosis (CMS/HCC) 2006, 2008    Left lower extremity multiple   • Depression    • Esophageal carcinoma (CMS/HCC) 12/31/2014    had chemo and radiation prior surgery   • Hemorrhoids    • HH (hiatus hernia)    • History of atrial fibrillation 2015    ONE EPISODE WHILE HOSPITALIZED   • History of kidney stones    • History of nephrolithiasis    • History of pancreatitis     PT STATES MANY YEARS AGO   • History of radiation therapy    • Hypertension    • Long-term (current) use of anticoagulants, INR goal 2.0-3.0    • Lymphedema    • Malignant neoplasm of prostate (CMS/HCC)    • Other hyperlipidemia 1/30/2018 January 30, 2018 lipid panel risk 12.8%   • Restless legs syndrome    • Shortness of breath    • Squamous carcinoma     on the head       SURGICAL HISTORY  Past Surgical History:   Procedure Laterality Date   • APPENDECTOMY  1950   • BRONCHOSCOPY      (Diagnostic)   • CATARACT  EXTRACTION Bilateral 2014   • COLONOSCOPY  12/15/2014    Complete / Description: EH, IH, torts, stool, follow-up colonoscopy due in 5 years.   • COLONOSCOPY N/A 6/13/2017    non-thrombosed external hemorrhoids, normal examined ileum, IH   • COLONOSCOPY N/A 2/26/2019    Procedure: COLONOSCOPY with Cold Polypectomy;  Surgeon: Mulugeta Millan MD;  Location: [facility] ENDOSCOPY;  Service: Gastroenterology   • CYSTOSCOPY W/ LASER LITHOTRIPSY     • ENDOSCOPY N/A 6/13/2017    Procedure: ESOPHAGOGASTRODUODENOSCOPY WITH COLD BIOPSY;  Surgeon: Lake Gonzalez MD;  Location: [facility] ENDOSCOPY;  Service:    • ESOPHAGECTOMY      April 2015, stage IIB esophageal carcinoma, sub-total resection.   • ESOPHAGECTOMY      Esophagectomy Subtotal Andrea Joe Procedure   • EXCISION LESION  08/2012    Removal of Squamous Cell CA on Head   • HAMMER TOE REPAIR  09/2014    Hammertoe Operation (Each Toe), 10/2014   • HAMMER TOE REPAIR Left 10/3/2017    Procedure: Left second third and fourth distal interphalangeal joint resection with flexor tenotomy;  Surgeon: Mulugeta Lira MD;  Location: [facility];  Service:    • HERNIA REPAIR      incisional   • JEJUNOSTOMY      Laparoscopic   • JEJUNOSTOMY      tube removal    • KNEE SURGERY Bilateral 1967, 1973, 1981   • PATELLA SURGERY Left     removed   • PILONIDAL CYST / SINUS EXCISION     • NC TOTAL KNEE ARTHROPLASTY Right 3/26/2018    Procedure: TOTAL KNEE ARTHROPLASTY;  Surgeon: Renny Solis MD;  Location: [facility] OR;  Service: Orthopedics   • PROSTATECTOMY  2010   • PYLOROPLASTY     • SPINAL FUSION  02/1998    C 5,6   • TONSILLECTOMY  1955   • TONSILLECTOMY     • UPPER GASTROINTESTINAL ENDOSCOPY  12/15/2014    LA Grade D esophagitis, Pardo's, HH, multiple duodenal ulcers   • VENTRAL/INCISIONAL HERNIA REPAIR N/A 4/14/2016    Procedure: VENTRAL/INCISIONAL HERNIA REPAIR, open, with mesh, and component separation;  Surgeon: Darren Rivas MD;  Location: [facility]  OR;  Service:        SOCIAL HISTORY  Social History     Socioeconomic History   • Marital status:      Spouse name: Lena   • Number of children: 0   • Years of education: College   • Highest education level: Not on file   Occupational History   • Occupation: Retired      Comment: Retired    Tobacco Use   • Smoking status: Former Smoker     Packs/day: 2.00     Years: 3.00     Pack years: 6.00     Types: Cigarettes     Last attempt to quit: 1974     Years since quittin.2   • Smokeless tobacco: Never Used   • Tobacco comment: no caffeine    Substance and Sexual Activity   • Alcohol use: Yes     Frequency: 2-3 times a week     Comment: 2-3 x per week   • Drug use: No   • Sexual activity: Defer   Social History Narrative    self-employed        FAMILY HISTORY  Family History   Problem Relation Age of Onset   • Cerebral aneurysm Mother         cerebral artery aneurysm ( age 56)   • Prostate cancer Brother 68   • Anxiety disorder Father    • Suicide Attempts Father          of suicide   • Cancer Father         bladder   • No Known Problems Brother    • Pancreatic cancer Nephew    • Colon cancer Neg Hx    • Esophageal cancer Neg Hx    • Dementia Neg Hx          **Dragon Disclaimer:   Much of this encounter note is an electronic transcription/translation of spoken language to printed text. The electronic translation of spoken language may permit erroneous, or at times, nonsensical words or phrases to be inadvertently transcribed. Although I have reviewed the note for such errors, some may still exist.       Template created by Moose Bolton MD

## 2020-03-09 NOTE — TELEPHONE ENCOUNTER
03/09/20  11:02 AM  Jose Hurtado  1950  Home Phone [phone]       Jose Hurtado is a patient of dr Cyr.     He wanted to let you know his bp today was 97/58, he is asymptomatic.  He said his bp is up and down.  I have instructed him to continue checking and let us know if he becomes lightheaded or dizzy.  Thanks  Rachel House RN  Triage nurse

## 2020-03-16 ENCOUNTER — HOSPITAL ENCOUNTER (OUTPATIENT)
Dept: GENERAL RADIOLOGY | Facility: HOSPITAL | Age: 70
Discharge: HOME | End: 2020-03-16
Admitting: NURSE PRACTITIONER

## 2020-03-16 DIAGNOSIS — K21.9 GASTROESOPHAGEAL REFLUX DISEASE, ESOPHAGITIS PRESENCE NOT SPECIFIED: ICD-10-CM

## 2020-03-16 DIAGNOSIS — K31.84 GASTROPARESIS: Chronic | ICD-10-CM

## 2020-03-16 DIAGNOSIS — C15.9 ADENOCARCINOMA OF ESOPHAGUS (HCC): ICD-10-CM

## 2020-03-16 DIAGNOSIS — R13.10 DYSPHAGIA, UNSPECIFIED TYPE: ICD-10-CM

## 2020-03-16 PROCEDURE — 63710000001 BARIUM SULFATE 40 % SUSPENSION: Performed by: NURSE PRACTITIONER

## 2020-03-16 PROCEDURE — 92611 MOTION FLUOROSCOPY/SWALLOW: CPT

## 2020-03-16 PROCEDURE — 63710000001 BARIUM SULFATE 98 % RECONSTITUTED SUSPENSION: Performed by: NURSE PRACTITIONER

## 2020-03-16 PROCEDURE — 63710000001 BARIUM SULFATE 40 % RECONSTITUTED SUSPENSION: Performed by: NURSE PRACTITIONER

## 2020-03-16 PROCEDURE — A9270 NON-COVERED ITEM OR SERVICE: HCPCS | Performed by: NURSE PRACTITIONER

## 2020-03-16 PROCEDURE — 74230 X-RAY XM SWLNG FUNCJ C+: CPT

## 2020-03-16 RX ADMIN — BARIUM SULFATE 50 ML: 400 SUSPENSION ORAL at 11:38

## 2020-03-16 RX ADMIN — BARIUM SULFATE 4 ML: 980 POWDER, FOR SUSPENSION ORAL at 11:38

## 2020-03-16 RX ADMIN — BARIUM SULFATE 55 ML: 0.81 POWDER, FOR SUSPENSION ORAL at 11:38

## 2020-03-16 NOTE — MBS/VFSS/FEES
Outpatient Speech Language Pathology   Adult Swallow Initial Evaluation  [facility]     Patient Name: Jose Hurtado  : 1950  MRN: [id number]  Today's Date: 3/16/2020         Visit Date: 2020   Patient Active Problem List   Diagnosis   • Adenocarcinoma of esophagus (CMS/HCC)   • Adenomatous polyp of colon   • Malignant neoplasm of prostate (CMS/HCC)   • RLS (restless legs syndrome)   • Depression   • Hypertension   • Anti-phospholipid syndrome (CMS/HCC)   • Anemia   • Insomnia due to other mental disorder   • Peripheral polyneuropathy   • B12 deficiency   • Postsurgical malabsorption   • Shortness of breath   • Lymphedema   • Pneumonia of right lower lobe due to infectious organism (CMS/HCC)   • Iron deficiency   • Elevated troponin   • Gastroparesis        Past Medical History:   Diagnosis Date   • Anemia    • Anti-phospholipid syndrome (CMS/HCC)     On lifelong anticoagulation therapy   • Bladder disorder     LEAKAGE   ON MED  wers pads   • Community acquired pneumonia     HISTORY OF IN    • Deep venous thrombosis (CMS/HCC) ,     Left lower extremity multiple   • Depression    • Esophageal carcinoma (CMS/HCC) 2014    had chemo and radiation prior surgery   • Hemorrhoids    • HH (hiatus hernia)    • History of atrial fibrillation 2015    ONE EPISODE WHILE HOSPITALIZED   • History of kidney stones    • History of nephrolithiasis    • History of pancreatitis     PT STATES MANY YEARS AGO   • History of radiation therapy    • Hypertension    • Long-term (current) use of anticoagulants, INR goal 2.0-3.0    • Lymphedema    • Malignant neoplasm of prostate (CMS/HCC)    • Other hyperlipidemia 2018 lipid panel risk 12.8%   • Restless legs syndrome    • Shortness of breath    • Squamous carcinoma     on the head        Past Surgical History:   Procedure Laterality Date   • APPENDECTOMY     • BRONCHOSCOPY      (Diagnostic)   • CATARACT EXTRACTION Bilateral     • COLONOSCOPY  12/15/2014    Complete / Description: EH, IH, torts, stool, follow-up colonoscopy due in 5 years.   • COLONOSCOPY N/A 6/13/2017    non-thrombosed external hemorrhoids, normal examined ileum, IH   • COLONOSCOPY N/A 2/26/2019    Procedure: COLONOSCOPY with Cold Polypectomy;  Surgeon: Mulugeta Millan MD;  Location: [facility] ENDOSCOPY;  Service: Gastroenterology   • CYSTOSCOPY W/ LASER LITHOTRIPSY     • ENDOSCOPY N/A 6/13/2017    Procedure: ESOPHAGOGASTRODUODENOSCOPY WITH COLD BIOPSY;  Surgeon: Lake Gonzalez MD;  Location: [facility] ENDOSCOPY;  Service:    • ESOPHAGECTOMY      April 2015, stage IIB esophageal carcinoma, sub-total resection.   • ESOPHAGECTOMY      Esophagectomy Subtotal [place] Procedure   • EXCISION LESION  08/2012    Removal of Squamous Cell CA on Head   • HAMMER TOE REPAIR  09/2014    Hammertoe Operation (Each Toe), 10/2014   • HAMMER TOE REPAIR Left 10/3/2017    Procedure: Left second third and fourth distal interphalangeal joint resection with flexor tenotomy;  Surgeon: Mulugeta Lira MD;  Location: [facility] OR OSC;  Service:    • HERNIA REPAIR      incisional   • JEJUNOSTOMY      Laparoscopic   • JEJUNOSTOMY      tube removal    • KNEE SURGERY Bilateral 1967, 1973, 1981   • PATELLA SURGERY Left     removed   • PILONIDAL CYST / SINUS EXCISION     • [place] TOTAL KNEE ARTHROPLASTY Right 3/26/2018    Procedure: TOTAL KNEE ARTHROPLASTY;  Surgeon: Renny Solis MD;  Location: [facility] MAIN OR;  Service: Orthopedics   • PROSTATECTOMY  2010   • PYLOROPLASTY     • SPINAL FUSION  02/1998    C 5,6   • TONSILLECTOMY  1955   • TONSILLECTOMY     • UPPER GASTROINTESTINAL ENDOSCOPY  12/15/2014    LA Grade D esophagitis, Pardo's, HH, multiple duodenal ulcers   • VENTRAL/INCISIONAL HERNIA REPAIR N/A 4/14/2016    Procedure: VENTRAL/INCISIONAL HERNIA REPAIR, open, with mesh, and component separation;  Surgeon: Darren Rivas MD;  Location: [facility] OR;  Service:          Visit  Dx:     ICD-10-CM ICD-9-CM   1. Gastroparesis K31.84 536.3   2. Gastroesophageal reflux disease, esophagitis presence not specified K21.9 530.81   3. Dysphagia, unspecified type R13.10 787.20   4. Adenocarcinoma of esophagus (CMS/[facility]) C15.9 150.9           SLP Adult Swallow Evaluation     Row Name 03/16/20 1700       Rehab Evaluation    Document Type  evaluation  -AW    Subjective Information  no complaints  -AW    Patient Observations  alert;cooperative;agree to therapy  -AW    Patient/Family Observations  Pt able to provide history.  -AW    Patient Effort  good  -AW    Symptoms Noted During/After Treatment  none  -AW       General Information    Patient Profile Reviewed  yes  -AW    Pertinent History Of Current Problem  Pt c/o coughing when he eats and was recently hospitalized for aspiration pneumonia. Pt has a h/o an esophagectomy ~5 years ago and a spinal fusion C 5-6 in ~ 1994.   -AW    Current Method of Nutrition  regular textures;thin liquids  -AW    Precautions/Limitations, Vision  WFL;for purposes of eval  -AW    Precautions/Limitations, Hearing  WFL;for purposes of eval  -AW    Prior Level of Function-Communication  WFL  -AW    Prior Level of Function-Swallowing  no diet consistency restrictions  -AW    Plans/Goals Discussed with  patient;spouse/S.O.;agreed upon  -AW    Barriers to Rehab  none identified  -AW    Patient's Goals for Discharge  eat/drink without coughing/choking  -AW    Family Goals for Discharge  family did not state  -AW       Pain Assessment    Additional Documentation  Pain Scale: Numbers Pre/Post-Treatment (Group)  -AW       Pain Scale: Numbers Pre/Post-Treatment    Pain Scale: Numbers, Pretreatment  0/10 - no pain  -AW    Pain Scale: Numbers, Post-Treatment  0/10 - no pain  -AW       Oral Motor and Function    Dentition Assessment  natural, present and adequate  -AW    Secretion Management  WNL/WFL  -AW    Mucosal Quality  moist, healthy  -AW       Oral Musculature and Cranial Nerve  Assessment    Oral Motor General Assessment  generalized oral motor weakness  -AW       General Eating/Swallowing Observations    Respiratory Support Currently in Use  room air  -AW    Eating/Swallowing Skills  fed by SLP;self-fed  -AW    Positioning During Eating  upright in chair  -AW       MBS/VFSS    Utensils Used  spoon;cup;straw  -AW    Consistencies Trialed  regular textures;soft textures;pureed;thin liquids;nectar/syrup-thick liquids mixed  -AW       MBS/VFSS Interpretation    Oral Prep Phase  WFL  -AW    Oral Transit Phase  impaired  -AW    Oral Residue  WFL  -AW    VFSS Summary  Pt presented with mild oropharyngeal dysphagia characterized by mistiming, decreased base of tongue strength, and decreased pharyngeal constriction. Pt tolerated cup trials of thin and nectar thick liquids with no penetration or aspiration. Non-transient pentration was noted with straw sips of thin. With liquids, pt had trace to mild tongue base and vallecular residue, cleared with a spontaneous swallow. Pt had premature spillage to the valleculae with solids. With large bite of pureed, pt had penetration from pharyngeal residuals to the cords which elicited a cough and cleared the material. With small bite size, pt had epiglottic undercoating, with no significant penetration. Pt had trace penetration with soft/mixed consistencies. Pt had mild to moderate pharyngeal residuals with solids, which were assisted in clearing by spontaneous swallows and liquid assist. Feel pt is safe to continue on a regular diet with thin liquids with use of strategies to increase safety of swallow. Strategies demonstrated and provided in written form. Pt and wife also educated on a home exercise program to strengthen swallow function.  -AW       Initiation of Pharyngeal Swallow    Pharyngeal Phase  impaired pharyngeal phase of swallowing  -AW       SLP Communication to Radiology    Summary Statement  Pt presented with mild oropharyngeal dysphagia  characterized by mistiming, decreased base of tongue strength, and decreased pharyngeal constriction. Pt tolerated cup trials of thin and nectar thick liquids with no penetration or aspiration. Non-transient pentration was noted with straw sips of thin. With liquids, pt had trace to mild tongue base and vallecular residue, cleared with a spontaneous swallow. Pt had premature spillage to the valleculae with solids. With large bite of pureed, pt had penetration from pharyngeal residuals to the cords which elicited a cough and cleared the material. With small bite size, pt had epiglottic undercoating, with no significant penetration. Pt had trace penetration with soft/mixed consistencies. Pt had mild to moderate pharyngeal residuals with solids, which were assisted in clearing by spontaneous swallows and liquid assist.   -AW       Clinical Impression    SLP Swallowing Diagnosis  mild;oral dysfunction;pharyngeal dysfunction  -AW    Functional Impact  risk of aspiration/pneumonia  -AW    Swallow Criteria for Skilled Therapeutic Interventions Met  demonstrates skilled criteria  -AW       Recommendations    Therapy Frequency (Swallow)  evaluation only  -AW    SLP Diet Recommendation  regular textures;thin liquids  -AW    Recommended Precautions and Strategies  upright posture during/after eating;small bites of food and sips of liquid;no straw;multiple swallows per bite of food;multiple swallows per sip of liquid;alternate between small bites of food and sips of liquid;volitional throat clear  -AW    SLP Rec. for Method of Medication Administration  meds whole;with thin liquids;with pudding or applesauce;as tolerated  -AW    Monitor for Signs of Aspiration  yes  -AW    Anticipated Dischage Disposition  home  -AW      User Key  (r) = Recorded By, (t) = Taken By, (c) = Cosigned By    Initials Name Provider Type    Saray Prado MS CCC-SLP Speech and Language Pathologist                        OP SLP Education     Row Name  03/16/20 5391       Education    Barriers to Learning  No barriers identified  -AW    Education Provided  Described results of evaluation;Patient expressed understanding of evaluation;Family/caregivers expressed understanding of evaluation;Patient participated in establishing goals and treatment plan;Family/caregivers participated in establishing goals and treatment plan;Patient demonstrated recommended strategies;Family/caregivers demonstrated recommended strategies  -AW    Assessed  Learning needs;Learning motivation;Learning preferences;Learning readiness  -AW    Learning Motivation  Strong  -AW    Learning Method  Explanation;Demonstration;Teach back;Written materials  -AW    Teaching Response  Verbalized understanding;Demonstrated understanding  -AW      User Key  (r) = Recorded By, (t) = Taken By, (c) = Cosigned By    Initials Name Effective Dates    Saray Prado MS CCC-SLP 06/08/18 -               OP SLP Assessment/Plan - 03/16/20 1746        SLP Assessment    Functional Problems  Swallowing   -AW    Impact on Function: Swallowing  Risk of aspiration   -AW    Clinical Impression: Swallowing  Mild:;oropharyngeal phase dysphagia   -AW    Prognosis  Good (comment)   -AW    Patient/caregiver participated in establishment of treatment plan and goals  Yes   -AW    Patient would benefit from skilled therapy intervention  No    Pt given an HEP to complete 2/day.  -AW      User Key  (r) = Recorded By, (t) = Taken By, (c) = Cosigned By    Initials Name Provider Type    Saray Prado MS CCC-SLP Speech and Language Pathologist              SLP Outcome Measures (last 72 hours)      SLP Outcome Measures     Row Name 03/16/20 1700             SLP Outcome Measures    Outcome Measure Used?  Adult NOMS  -AW         Adult FCM Scores    FCM Chosen  Swallowing  -AW      Swallowing FCM Score  6  -AW        User Key  (r) = Recorded By, (t) = Taken By, (c) = Cosigned By    Initials Name Effective Dates    Saray Prado MS  CCC-SLP 06/08/18 -                Time Calculation:   SLP Start Time: 1100    Therapy Charges for Today     Code Description Service Date Service Provider Modifiers Qty    [id number] HC ST MOTION FLUORO EVAL SWALLOW 5 3/16/2020 Saray Sullivan, MS CCC-SLP GN 1                   Saray Sullivan MS CCC-SLP  3/16/2020

## 2020-03-18 ENCOUNTER — HOSPITAL ENCOUNTER (OUTPATIENT)
Dept: CT IMAGING | Facility: HOSPITAL | Age: 70
Discharge: HOME | End: 2020-03-18
Admitting: NURSE PRACTITIONER

## 2020-03-18 ENCOUNTER — APPOINTMENT (OUTPATIENT)
Dept: CT IMAGING | Facility: HOSPITAL | Age: 70
End: 2020-03-18

## 2020-03-18 DIAGNOSIS — C15.9 ADENOCARCINOMA OF ESOPHAGUS (HCC): ICD-10-CM

## 2020-03-18 LAB — CREAT BLDA-MCNC: 0.9 MG/DL (ref 0.6–1.3)

## 2020-03-18 PROCEDURE — 25010000002 IOPAMIDOL 61 % SOLUTION: Performed by: NURSE PRACTITIONER

## 2020-03-18 PROCEDURE — 82565 ASSAY OF CREATININE: CPT

## 2020-03-18 PROCEDURE — 74177 CT ABD & PELVIS W/CONTRAST: CPT

## 2020-03-18 PROCEDURE — 71260 CT THORAX DX C+: CPT

## 2020-03-18 RX ADMIN — IOPAMIDOL 85 ML: 612 INJECTION, SOLUTION INTRAVENOUS at 11:42

## 2020-03-19 ENCOUNTER — TELEPHONE (OUTPATIENT)
Dept: SURGERY | Facility: CLINIC | Age: 70
End: 2020-03-19

## 2020-03-19 DIAGNOSIS — C15.9 ADENOCARCINOMA OF ESOPHAGUS (HCC): Primary | ICD-10-CM

## 2020-03-19 NOTE — TELEPHONE ENCOUNTER
I spoke with Mr. Hurtado today March 19, 2020.  I discussed CT of the chest and abdomen with him.  I see no evidence for recurrent or metastatic cancer.  He is having some minor issues with swallowing and sounds as though he may be aspirating.  He has seen speech therapy and they are working with him.  This is not uncommon after this kind of surgery.  He seems to be doing well and we will follow-up with him in 1 year.  We will repeat the CT scans in 1 year.  It is been 3 years since his esophagectomy and I believe that he is doing quite well.    #3

## 2020-03-24 ENCOUNTER — OFFICE VISIT (OUTPATIENT)
Dept: INTERNAL MEDICINE | Age: 70
End: 2020-03-24

## 2020-03-24 ENCOUNTER — APPOINTMENT (OUTPATIENT)
Dept: CT IMAGING | Facility: HOSPITAL | Age: 70
End: 2020-03-24

## 2020-03-24 ENCOUNTER — APPOINTMENT (OUTPATIENT)
Dept: GENERAL RADIOLOGY | Facility: HOSPITAL | Age: 70
End: 2020-03-24

## 2020-03-24 ENCOUNTER — HOSPITAL ENCOUNTER (OUTPATIENT)
Dept: CARDIOLOGY | Facility: HOSPITAL | Age: 70
Discharge: HOME | End: 2020-03-24
Admitting: INTERNAL MEDICINE

## 2020-03-24 ENCOUNTER — HOSPITAL ENCOUNTER (INPATIENT)
Facility: HOSPITAL | Age: 70
LOS: 3 days | Discharge: HOME | End: 2020-03-27
Attending: EMERGENCY MEDICINE | Admitting: INTERNAL MEDICINE

## 2020-03-24 VITALS
HEART RATE: 67 BPM | WEIGHT: 222 LBS | TEMPERATURE: 96.9 F | HEIGHT: 77 IN | BODY MASS INDEX: 26.21 KG/M2 | OXYGEN SATURATION: 99 % | SYSTOLIC BLOOD PRESSURE: 140 MMHG | DIASTOLIC BLOOD PRESSURE: 66 MMHG

## 2020-03-24 DIAGNOSIS — I82.432 ACUTE DEEP VEIN THROMBOSIS (DVT) OF POPLITEAL VEIN OF LEFT LOWER EXTREMITY (HCC): Primary | ICD-10-CM

## 2020-03-24 DIAGNOSIS — R77.8 ELEVATED TROPONIN: ICD-10-CM

## 2020-03-24 DIAGNOSIS — R60.0 EDEMA OF BOTH LOWER EXTREMITIES: Primary | ICD-10-CM

## 2020-03-24 DIAGNOSIS — R60.0 PEDAL EDEMA: ICD-10-CM

## 2020-03-24 DIAGNOSIS — D68.61 ANTI-PHOSPHOLIPID SYNDROME (HCC): ICD-10-CM

## 2020-03-24 DIAGNOSIS — R26.2 DIFFICULTY WALKING: ICD-10-CM

## 2020-03-24 DIAGNOSIS — Z79.01 ANTICOAGULATED ON WARFARIN: ICD-10-CM

## 2020-03-24 PROBLEM — R79.1 SUPRATHERAPEUTIC INTERNATIONAL NORMALIZED RATIO (INR): Status: ACTIVE | Noted: 2020-03-24

## 2020-03-24 PROBLEM — J15.6 PNEUMONIA DUE TO GRAM-NEGATIVE BACTERIA (HCC): Status: ACTIVE | Noted: 2020-03-24

## 2020-03-24 PROBLEM — J15.69 PNEUMONIA DUE TO GRAM-NEGATIVE BACTERIA: Status: ACTIVE | Noted: 2020-03-24

## 2020-03-24 LAB
ALBUMIN SERPL-MCNC: 3.5 G/DL (ref 3.5–5.2)
ALBUMIN/GLOB SERPL: 1.2 G/DL
ALP SERPL-CCNC: 113 U/L (ref 39–117)
ALT SERPL W P-5'-P-CCNC: 16 U/L (ref 1–41)
ANION GAP SERPL CALCULATED.3IONS-SCNC: 7.7 MMOL/L (ref 5–15)
ANION GAP SERPL CALCULATED.3IONS-SCNC: 8.9 MMOL/L (ref 5–15)
AST SERPL-CCNC: 22 U/L (ref 1–40)
BASOPHILS # BLD AUTO: 0.02 10*3/MM3 (ref 0–0.2)
BASOPHILS # BLD AUTO: 0.02 10*3/MM3 (ref 0–0.2)
BASOPHILS NFR BLD AUTO: 0.7 % (ref 0–1.5)
BASOPHILS NFR BLD AUTO: 0.8 % (ref 0–1.5)
BH CV LOW VAS LEFT GASTRONEMIUS VESSEL: 1
BH CV LOW VAS LEFT POPLITEAL SPONT: 1
BH CV LOW VAS RIGHT GREATER SAPH BK VESSEL: 1
BH CV LOW VAS RIGHT LESSER SAPH VESSEL: 1
BH CV LOWER VASCULAR LEFT COMMON FEMORAL AUGMENT: NORMAL
BH CV LOWER VASCULAR LEFT COMMON FEMORAL COMPETENT: NORMAL
BH CV LOWER VASCULAR LEFT COMMON FEMORAL COMPRESS: NORMAL
BH CV LOWER VASCULAR LEFT COMMON FEMORAL PHASIC: NORMAL
BH CV LOWER VASCULAR LEFT COMMON FEMORAL SPONT: NORMAL
BH CV LOWER VASCULAR LEFT DISTAL FEMORAL COMPRESS: NORMAL
BH CV LOWER VASCULAR LEFT GASTRONEMIUS COMPRESS: NORMAL
BH CV LOWER VASCULAR LEFT GASTRONEMIUS THROMBUS: NORMAL
BH CV LOWER VASCULAR LEFT GREATER SAPH AK COMPRESS: NORMAL
BH CV LOWER VASCULAR LEFT GREATER SAPH BK COMPRESS: NORMAL
BH CV LOWER VASCULAR LEFT MID FEMORAL AUGMENT: NORMAL
BH CV LOWER VASCULAR LEFT MID FEMORAL COMPETENT: NORMAL
BH CV LOWER VASCULAR LEFT MID FEMORAL COMPRESS: NORMAL
BH CV LOWER VASCULAR LEFT MID FEMORAL PHASIC: NORMAL
BH CV LOWER VASCULAR LEFT MID FEMORAL SPONT: NORMAL
BH CV LOWER VASCULAR LEFT PERONEAL COMPRESS: NORMAL
BH CV LOWER VASCULAR LEFT POPLITEAL AUGMENT: NORMAL
BH CV LOWER VASCULAR LEFT POPLITEAL COMPRESS: NORMAL
BH CV LOWER VASCULAR LEFT POPLITEAL PHASIC: NORMAL
BH CV LOWER VASCULAR LEFT POPLITEAL SPONT: NORMAL
BH CV LOWER VASCULAR LEFT POPLITEAL THROMBUS: NORMAL
BH CV LOWER VASCULAR LEFT POSTERIOR TIBIAL COMPRESS: NORMAL
BH CV LOWER VASCULAR LEFT PROFUNDA FEMORAL COMPRESS: NORMAL
BH CV LOWER VASCULAR LEFT PROXIMAL FEMORAL COMPRESS: NORMAL
BH CV LOWER VASCULAR LEFT SAPHENOFEMORAL JUNCTION AUGMENT: NORMAL
BH CV LOWER VASCULAR LEFT SAPHENOFEMORAL JUNCTION COMPETENT: NORMAL
BH CV LOWER VASCULAR LEFT SAPHENOFEMORAL JUNCTION COMPRESS: NORMAL
BH CV LOWER VASCULAR LEFT SAPHENOFEMORAL JUNCTION PHASIC: NORMAL
BH CV LOWER VASCULAR LEFT SAPHENOFEMORAL JUNCTION SPONT: NORMAL
BH CV LOWER VASCULAR LEFT SOLEAL COMPRESS: NORMAL
BH CV LOWER VASCULAR RIGHT COMMON FEMORAL AUGMENT: NORMAL
BH CV LOWER VASCULAR RIGHT COMMON FEMORAL COMPETENT: NORMAL
BH CV LOWER VASCULAR RIGHT COMMON FEMORAL COMPRESS: NORMAL
BH CV LOWER VASCULAR RIGHT COMMON FEMORAL PHASIC: NORMAL
BH CV LOWER VASCULAR RIGHT COMMON FEMORAL SPONT: NORMAL
BH CV LOWER VASCULAR RIGHT DISTAL FEMORAL COMPRESS: NORMAL
BH CV LOWER VASCULAR RIGHT GASTRONEMIUS COMPRESS: NORMAL
BH CV LOWER VASCULAR RIGHT GREATER SAPH AK COMPRESS: NORMAL
BH CV LOWER VASCULAR RIGHT GREATER SAPH BK COMPRESS: NORMAL
BH CV LOWER VASCULAR RIGHT GREATER SAPH BK THROMBUS: NORMAL
BH CV LOWER VASCULAR RIGHT LESSER SAPH COMPRESS: NORMAL
BH CV LOWER VASCULAR RIGHT LESSER SAPH THROMBUS: NORMAL
BH CV LOWER VASCULAR RIGHT MID FEMORAL AUGMENT: NORMAL
BH CV LOWER VASCULAR RIGHT MID FEMORAL COMPETENT: NORMAL
BH CV LOWER VASCULAR RIGHT MID FEMORAL COMPRESS: NORMAL
BH CV LOWER VASCULAR RIGHT MID FEMORAL PHASIC: NORMAL
BH CV LOWER VASCULAR RIGHT MID FEMORAL SPONT: NORMAL
BH CV LOWER VASCULAR RIGHT PERONEAL COMPRESS: NORMAL
BH CV LOWER VASCULAR RIGHT POPLITEAL AUGMENT: NORMAL
BH CV LOWER VASCULAR RIGHT POPLITEAL COMPETENT: NORMAL
BH CV LOWER VASCULAR RIGHT POPLITEAL COMPRESS: NORMAL
BH CV LOWER VASCULAR RIGHT POPLITEAL PHASIC: NORMAL
BH CV LOWER VASCULAR RIGHT POPLITEAL SPONT: NORMAL
BH CV LOWER VASCULAR RIGHT POSTERIOR TIBIAL COMPRESS: NORMAL
BH CV LOWER VASCULAR RIGHT PROFUNDA FEMORAL COMPRESS: NORMAL
BH CV LOWER VASCULAR RIGHT PROXIMAL FEMORAL COMPRESS: NORMAL
BH CV LOWER VASCULAR RIGHT SAPHENOFEMORAL JUNCTION COMPRESS: NORMAL
BH CV LOWER VASCULAR RIGHT SOLEAL COMPRESS: NORMAL
BILIRUB SERPL-MCNC: 0.2 MG/DL (ref 0.2–1.2)
BUN BLD-MCNC: 19 MG/DL (ref 8–23)
BUN BLD-MCNC: 20 MG/DL (ref 8–23)
BUN/CREAT SERPL: 19.8 (ref 7–25)
BUN/CREAT SERPL: 20.8 (ref 7–25)
CALCIUM SPEC-SCNC: 8.8 MG/DL (ref 8.6–10.5)
CALCIUM SPEC-SCNC: 8.8 MG/DL (ref 8.6–10.5)
CHLORIDE SERPL-SCNC: 102 MMOL/L (ref 98–107)
CHLORIDE SERPL-SCNC: 103 MMOL/L (ref 98–107)
CO2 SERPL-SCNC: 27.1 MMOL/L (ref 22–29)
CO2 SERPL-SCNC: 28.3 MMOL/L (ref 22–29)
CREAT BLD-MCNC: 0.96 MG/DL (ref 0.76–1.27)
CREAT BLD-MCNC: 0.96 MG/DL (ref 0.76–1.27)
DEPRECATED RDW RBC AUTO: 48.9 FL (ref 37–54)
DEPRECATED RDW RBC AUTO: 52.1 FL (ref 37–54)
EOSINOPHIL # BLD AUTO: 0.22 10*3/MM3 (ref 0–0.4)
EOSINOPHIL # BLD AUTO: 0.29 10*3/MM3 (ref 0–0.4)
EOSINOPHIL NFR BLD AUTO: 10 % (ref 0.3–6.2)
EOSINOPHIL NFR BLD AUTO: 8.5 % (ref 0.3–6.2)
ERYTHROCYTE [DISTWIDTH] IN BLOOD BY AUTOMATED COUNT: 15.4 % (ref 12.3–15.4)
ERYTHROCYTE [DISTWIDTH] IN BLOOD BY AUTOMATED COUNT: 15.9 % (ref 12.3–15.4)
GFR SERPL CREATININE-BSD FRML MDRD: 77 ML/MIN/1.73
GFR SERPL CREATININE-BSD FRML MDRD: 77 ML/MIN/1.73
GLOBULIN UR ELPH-MCNC: 3 GM/DL
GLUCOSE BLD-MCNC: 100 MG/DL (ref 65–99)
GLUCOSE BLD-MCNC: 91 MG/DL (ref 65–99)
HCT VFR BLD AUTO: 26.9 % (ref 37.5–51)
HCT VFR BLD AUTO: 27.6 % (ref 37.5–51)
HGB BLD-MCNC: 8.6 G/DL (ref 13–17.7)
HGB BLD-MCNC: 9 G/DL (ref 13–17.7)
IMM GRANULOCYTES # BLD AUTO: 0 10*3/MM3 (ref 0–0.05)
IMM GRANULOCYTES # BLD AUTO: 0.01 10*3/MM3 (ref 0–0.05)
IMM GRANULOCYTES NFR BLD AUTO: 0 % (ref 0–0.5)
IMM GRANULOCYTES NFR BLD AUTO: 0.3 % (ref 0–0.5)
INR PPP: 3.3 (ref 0.9–1.1)
INR PPP: 3.9 (ref 2–3)
LYMPHOCYTES # BLD AUTO: 0.67 10*3/MM3 (ref 0.7–3.1)
LYMPHOCYTES # BLD AUTO: 0.73 10*3/MM3 (ref 0.7–3.1)
LYMPHOCYTES NFR BLD AUTO: 25.3 % (ref 19.6–45.3)
LYMPHOCYTES NFR BLD AUTO: 25.9 % (ref 19.6–45.3)
MCH RBC QN AUTO: 28.2 PG (ref 26.6–33)
MCH RBC QN AUTO: 29.4 PG (ref 26.6–33)
MCHC RBC AUTO-ENTMCNC: 32 G/DL (ref 31.5–35.7)
MCHC RBC AUTO-ENTMCNC: 32.6 G/DL (ref 31.5–35.7)
MCV RBC AUTO: 88.2 FL (ref 79–97)
MCV RBC AUTO: 90.2 FL (ref 79–97)
MONOCYTES # BLD AUTO: 0.28 10*3/MM3 (ref 0.1–0.9)
MONOCYTES # BLD AUTO: 0.28 10*3/MM3 (ref 0.1–0.9)
MONOCYTES NFR BLD AUTO: 10.8 % (ref 5–12)
MONOCYTES NFR BLD AUTO: 9.7 % (ref 5–12)
NEUTROPHILS # BLD AUTO: 1.4 10*3/MM3 (ref 1.7–7)
NEUTROPHILS # BLD AUTO: 1.56 10*3/MM3 (ref 1.7–7)
NEUTROPHILS NFR BLD AUTO: 54 % (ref 42.7–76)
NEUTROPHILS NFR BLD AUTO: 54 % (ref 42.7–76)
NRBC BLD AUTO-RTO: 0 /100 WBC (ref 0–0.2)
NRBC BLD AUTO-RTO: 0 /100 WBC (ref 0–0.2)
NT-PROBNP SERPL-MCNC: 1185 PG/ML (ref 5–900)
PLATELET # BLD AUTO: 130 10*3/MM3 (ref 140–450)
PLATELET # BLD AUTO: 137 10*3/MM3 (ref 140–450)
PMV BLD AUTO: 9.4 FL (ref 6–12)
PMV BLD AUTO: 9.5 FL (ref 6–12)
POTASSIUM BLD-SCNC: 3.8 MMOL/L (ref 3.5–5.2)
POTASSIUM BLD-SCNC: 3.9 MMOL/L (ref 3.5–5.2)
PROT SERPL-MCNC: 6.5 G/DL (ref 6–8.5)
PROTHROMBIN TIME: 33.3 SECONDS (ref 11.7–14.2)
RBC # BLD AUTO: 3.05 10*6/MM3 (ref 4.14–5.8)
RBC # BLD AUTO: 3.06 10*6/MM3 (ref 4.14–5.8)
SODIUM BLD-SCNC: 138 MMOL/L (ref 136–145)
SODIUM BLD-SCNC: 139 MMOL/L (ref 136–145)
TROPONIN T SERPL-MCNC: 0.04 NG/ML (ref 0–0.03)
WBC NRBC COR # BLD: 2.59 10*3/MM3 (ref 3.4–10.8)
WBC NRBC COR # BLD: 2.89 10*3/MM3 (ref 3.4–10.8)

## 2020-03-24 PROCEDURE — 85610 PROTHROMBIN TIME: CPT | Performed by: INTERNAL MEDICINE

## 2020-03-24 PROCEDURE — 36415 COLL VENOUS BLD VENIPUNCTURE: CPT | Performed by: INTERNAL MEDICINE

## 2020-03-24 PROCEDURE — 85025 COMPLETE CBC W/AUTO DIFF WBC: CPT | Performed by: EMERGENCY MEDICINE

## 2020-03-24 PROCEDURE — 0 IOPAMIDOL PER 1 ML: Performed by: EMERGENCY MEDICINE

## 2020-03-24 PROCEDURE — 83880 ASSAY OF NATRIURETIC PEPTIDE: CPT | Performed by: EMERGENCY MEDICINE

## 2020-03-24 PROCEDURE — 87040 BLOOD CULTURE FOR BACTERIA: CPT | Performed by: INTERNAL MEDICINE

## 2020-03-24 PROCEDURE — 71046 X-RAY EXAM CHEST 2 VIEWS: CPT

## 2020-03-24 PROCEDURE — 99215 OFFICE O/P EST HI 40 MIN: CPT | Performed by: INTERNAL MEDICINE

## 2020-03-24 PROCEDURE — 93005 ELECTROCARDIOGRAM TRACING: CPT | Performed by: EMERGENCY MEDICINE

## 2020-03-24 PROCEDURE — 85025 COMPLETE CBC W/AUTO DIFF WBC: CPT | Performed by: INTERNAL MEDICINE

## 2020-03-24 PROCEDURE — 93970 EXTREMITY STUDY: CPT

## 2020-03-24 PROCEDURE — 99284 EMERGENCY DEPT VISIT MOD MDM: CPT

## 2020-03-24 PROCEDURE — 25010000002 CEFTRIAXONE PER 250 MG: Performed by: INTERNAL MEDICINE

## 2020-03-24 PROCEDURE — 93010 ELECTROCARDIOGRAM REPORT: CPT | Performed by: INTERNAL MEDICINE

## 2020-03-24 PROCEDURE — 36416 COLLJ CAPILLARY BLOOD SPEC: CPT | Performed by: INTERNAL MEDICINE

## 2020-03-24 PROCEDURE — 84484 ASSAY OF TROPONIN QUANT: CPT | Performed by: EMERGENCY MEDICINE

## 2020-03-24 PROCEDURE — 71275 CT ANGIOGRAPHY CHEST: CPT

## 2020-03-24 PROCEDURE — 80053 COMPREHEN METABOLIC PANEL: CPT | Performed by: EMERGENCY MEDICINE

## 2020-03-24 RX ORDER — ACETAMINOPHEN 160 MG/5ML
650 SOLUTION ORAL EVERY 4 HOURS PRN
Status: DISCONTINUED | OUTPATIENT
Start: 2020-03-24 | End: 2020-03-27 | Stop reason: HOSPADM

## 2020-03-24 RX ORDER — FUROSEMIDE 20 MG/1
20 TABLET ORAL DAILY
Status: DISCONTINUED | OUTPATIENT
Start: 2020-03-24 | End: 2020-03-27 | Stop reason: HOSPADM

## 2020-03-24 RX ORDER — FERROUS SULFATE 325(65) MG
325 TABLET ORAL
Status: DISCONTINUED | OUTPATIENT
Start: 2020-03-25 | End: 2020-03-27 | Stop reason: HOSPADM

## 2020-03-24 RX ORDER — ONDANSETRON 2 MG/ML
4 INJECTION INTRAMUSCULAR; INTRAVENOUS EVERY 6 HOURS PRN
Status: DISCONTINUED | OUTPATIENT
Start: 2020-03-24 | End: 2020-03-27 | Stop reason: HOSPADM

## 2020-03-24 RX ORDER — SODIUM CHLORIDE 0.9 % (FLUSH) 0.9 %
10 SYRINGE (ML) INJECTION EVERY 12 HOURS SCHEDULED
Status: DISCONTINUED | OUTPATIENT
Start: 2020-03-24 | End: 2020-03-27 | Stop reason: HOSPADM

## 2020-03-24 RX ORDER — HYDROCODONE BITARTRATE AND ACETAMINOPHEN 5; 325 MG/1; MG/1
1 TABLET ORAL EVERY 4 HOURS PRN
Status: DISCONTINUED | OUTPATIENT
Start: 2020-03-24 | End: 2020-03-27 | Stop reason: HOSPADM

## 2020-03-24 RX ORDER — ALBUTEROL SULFATE 2.5 MG/3ML
2.5 SOLUTION RESPIRATORY (INHALATION) EVERY 4 HOURS PRN
Status: DISCONTINUED | OUTPATIENT
Start: 2020-03-24 | End: 2020-03-27 | Stop reason: HOSPADM

## 2020-03-24 RX ORDER — HYDROCODONE BITARTRATE AND ACETAMINOPHEN 10; 325 MG/1; MG/1
1 TABLET ORAL EVERY 4 HOURS PRN
Status: DISCONTINUED | OUTPATIENT
Start: 2020-03-24 | End: 2020-03-27 | Stop reason: HOSPADM

## 2020-03-24 RX ORDER — PANTOPRAZOLE SODIUM 40 MG/1
40 TABLET, DELAYED RELEASE ORAL
Status: DISCONTINUED | OUTPATIENT
Start: 2020-03-25 | End: 2020-03-27 | Stop reason: HOSPADM

## 2020-03-24 RX ORDER — TRAZODONE HYDROCHLORIDE 100 MG/1
100 TABLET ORAL NIGHTLY
Status: DISCONTINUED | OUTPATIENT
Start: 2020-03-24 | End: 2020-03-27 | Stop reason: HOSPADM

## 2020-03-24 RX ORDER — SODIUM CHLORIDE 0.9 % (FLUSH) 0.9 %
10 SYRINGE (ML) INJECTION AS NEEDED
Status: DISCONTINUED | OUTPATIENT
Start: 2020-03-24 | End: 2020-03-27 | Stop reason: HOSPADM

## 2020-03-24 RX ORDER — CEFTRIAXONE SODIUM 1 G/50ML
1 INJECTION, SOLUTION INTRAVENOUS EVERY 24 HOURS
Status: DISCONTINUED | OUTPATIENT
Start: 2020-03-24 | End: 2020-03-26

## 2020-03-24 RX ORDER — TAMSULOSIN HYDROCHLORIDE 0.4 MG/1
0.4 CAPSULE ORAL NIGHTLY
Status: DISCONTINUED | OUTPATIENT
Start: 2020-03-24 | End: 2020-03-27 | Stop reason: HOSPADM

## 2020-03-24 RX ORDER — ACETAMINOPHEN 325 MG/1
650 TABLET ORAL EVERY 4 HOURS PRN
Status: DISCONTINUED | OUTPATIENT
Start: 2020-03-24 | End: 2020-03-27 | Stop reason: HOSPADM

## 2020-03-24 RX ORDER — ACETAMINOPHEN 650 MG/1
650 SUPPOSITORY RECTAL EVERY 4 HOURS PRN
Status: DISCONTINUED | OUTPATIENT
Start: 2020-03-24 | End: 2020-03-27 | Stop reason: HOSPADM

## 2020-03-24 RX ORDER — PAROXETINE HYDROCHLORIDE 20 MG/1
40 TABLET, FILM COATED ORAL EVERY MORNING
Status: DISCONTINUED | OUTPATIENT
Start: 2020-03-25 | End: 2020-03-27 | Stop reason: HOSPADM

## 2020-03-24 RX ADMIN — TAMSULOSIN HYDROCHLORIDE 0.4 MG: 0.4 CAPSULE ORAL at 20:38

## 2020-03-24 RX ADMIN — SODIUM CHLORIDE, PRESERVATIVE FREE 10 ML: 5 INJECTION INTRAVENOUS at 21:15

## 2020-03-24 RX ADMIN — TRAZODONE HYDROCHLORIDE 100 MG: 100 TABLET ORAL at 22:29

## 2020-03-24 RX ADMIN — FUROSEMIDE 20 MG: 20 TABLET ORAL at 20:38

## 2020-03-24 RX ADMIN — IOPAMIDOL 95 ML: 755 INJECTION, SOLUTION INTRAVENOUS at 14:51

## 2020-03-24 RX ADMIN — DOXYCYCLINE 100 MG: 100 INJECTION, POWDER, LYOPHILIZED, FOR SOLUTION INTRAVENOUS at 20:39

## 2020-03-24 RX ADMIN — CEFTRIAXONE SODIUM 1 G: 1 INJECTION, SOLUTION INTRAVENOUS at 20:39

## 2020-03-24 RX ADMIN — SODIUM CHLORIDE, PRESERVATIVE FREE 10 ML: 5 INJECTION INTRAVENOUS at 14:02

## 2020-03-24 NOTE — PROGRESS NOTES
BLEV duplex complete, preliminary report show acute Lt leg DVT and chronic Rt leg STP. Called to Brandin Bolton MD. Dr instructed me to take patient to the ER.

## 2020-03-24 NOTE — PROGRESS NOTES
[facility] INTERNAL MEDICINE  RICHARDSON BOLTON M.D.      Jose FITCH Hurtado / 70 y.o. / male  03/24/2020      CC:  Main reason(s) for today's visit: Massive swelling of BLE      HPI:     Massive swelling for bilateral lower extremities: noticed it yesterday. Denies chest pain, palpitations, dyspnea, PND/orthopnea, change in urination.   Had CT with contrast (Dr. Christine) several days ago. Creatinine prior to CT was normal. Denies pain of the legs. There is some redness and warmth. Denies fever or chills. Did not take furosemide 20 mg dose last two days (forgot to take it). History of APS on warfarin with most recently INR's of   Lab Results   Component Value Date    INR 2.90 03/03/2020    INR 3.20 (A) 02/27/2020    INR 2.37 (H) 02/19/2020    PROTIME 25.6 (H) 02/19/2020    PROTIME 23.2 (H) 02/18/2020    PROTIME 24.4 (H) 02/17/2020          Patient Care Team:  Brandin Bolton MD as PCP - General (Internal Medicine)  Code, George THORPE II, MD as Consulting Physician (Hematology and Oncology)  Jose Inman MD as Consulting Physician (Urology)  Mulugeta Millan MD as Consulting Physician (Gastroenterology)  Seth Randhawa MD as Consulting Physician (Ophthalmology)    ASSESSMENT & PLAN:    1. Edema of both lower extremities    2. Anti-phospholipid syndrome (CMS/HCC)      Orders Placed This Encounter   Procedures   • XR Chest 2 View   • Comprehensive Metabolic Panel   • POCT INR     No orders of the defined types were placed in this encounter.       Summary/Discussion:  Massive edema of the legs without overt evidence of heart failure. Had contrasted CT recently.   INR is 3.9 today without evidence of PE. He had not taken furosemide 20 mg for 2 doses but this amount of swelling would be disproportional.   Check STAT CMP to assess renal function and liver labs.   Check STAT BLE venous duplex to r/o DVT (although he has therapeutic INR level, he has APS)  Check STAT CXR to r/o CHF.   Take furosemide 20 mg two daily until seen for  follow-up on Friday.     INR: Hold coumadin today, take just 2.5 mg tomorrow; then take 2.5 mg every day, except 5 mg on Saturdays; repeat INR in 2 weeks.         Next Appointment with me: Visit date not found    Return in about 3 days (around 3/27/2020) for Reassess today's problem(s).    ____________________________________________________________________    MEDICATIONS  Current Outpatient Medications   Medication Sig Dispense Refill   • albuterol sulfate  (90 Base) MCG/ACT inhaler Inhale 1 puff Every 4 (Four) Hours As Needed for Wheezing.     • ferrous sulfate 325 (65 FE) MG tablet Take 2 tablets by mouth Daily With Breakfast. 90 tablet 0   • furosemide (LASIX) 20 MG tablet Take 1 tablet by mouth Daily. 30 tablet 0   • metoprolol tartrate (LOPRESSOR) 25 MG tablet Take 0.5 tablets by mouth Every 12 (Twelve) Hours. 60 tablet 0   • pantoprazole (PROTONIX) 40 MG EC tablet Take 1 tablet by mouth 2 (Two) Times a Day Before Meals. 60 tablet 0   • PARoxetine (PAXIL) 40 MG tablet Take 1 tablet by mouth Every Morning. 30 tablet 3   • potassium chloride (MICRO-K) 10 MEQ CR capsule      • pramipexole (MIRAPEX) 1.5 MG tablet Take 1 tablet by mouth 2 (Two) Times a Day. 180 tablet 1   • STIOLTO RESPIMAT 2.5-2.5 MCG/ACT aerosol solution inhaler Inhale 2 puffs Daily. (Patient taking differently: Inhale 2 puffs Daily. prn)     • tamsulosin (FLOMAX) 0.4 MG capsule 24 hr capsule Take 1 capsule by mouth Every Night.     • traZODone (DESYREL) 100 MG tablet Take 1 tablet by mouth Every Night for 180 days. 30 tablet 5   • warfarin (COUMADIN) 5 MG tablet 2.5 mg Monday, Wednesday, Friday, Sunday ; 5 mg on Tuesday, Thursday, Saturday 90 tablet 2     Current Facility-Administered Medications   Medication Dose Route Frequency Provider Last Rate Last Dose   • cyanocobalamin injection 1,000 mcg  1,000 mcg Intramuscular Q28 Days Branidn Bolton MD   1,000 mcg at 02/28/20 7015     "      ____________________________________________________________________      REVIEW OF SYSTEMS    Review of Systems   Constitutional: Positive for unexpected weight change (23 lbs gain since 3/9/20).   Respiratory: Negative for cough and shortness of breath.    Cardiovascular: Positive for leg swelling. Negative for chest pain and palpitations.   Genitourinary: Negative for difficulty urinating and scrotal swelling.   Hematological: Does not bruise/bleed easily.   All other systems reviewed and negative       VITALS    Visit Vitals  /66   Pulse 67   Temp 96.9 °F (36.1 °C)   Ht 195.6 cm (77\")   Wt 101 kg (222 lb)   SpO2 99%   BMI 26.33 kg/m²       BP Readings from Last 3 Encounters:   03/24/20 140/66   03/09/20 110/50   03/06/20 (!) 82/44     Wt Readings from Last 3 Encounters:   03/24/20 101 kg (222 lb)   03/09/20 90.3 kg (199 lb)   03/06/20 88.3 kg (194 lb 9.6 oz)      Body mass index is 26.33 kg/m².    PHYSICAL EXAMINATION    Physical Exam   Constitutional: No distress.   Neck: No JVD present.   Cardiovascular: Normal rate, regular rhythm and normal heart sounds. Exam reveals no gallop and no friction rub.   4+ BLE edema of lower legs  Mild warmth and erythema of anterior lower legs  No weeping/drainage/skin break  No significant tenderness with palpation    Neurological: He is alert.   Psychiatric: Judgment normal.         REVIEWED DATA:    Labs:   Lab Results   Component Value Date     02/17/2020    K 4.3 02/17/2020    AST 36 02/10/2020    ALT 23 02/10/2020    BUN 24 (H) 02/17/2020    CREATININE 0.90 03/18/2020    CREATININE 1.08 02/17/2020    CREATININE 1.29 (H) 02/16/2020      Lab Results   Component Value Date    WBC 5.90 02/16/2020    HGB 9.0 (L) 02/16/2020     02/16/2020          Imaging:         Medical Tests:   Echocardiogram:  1/15/20 :      · Estimated EF = 72%.  · Left ventricular systolic function is hyperdynamic (EF > 70).  · Calculated right ventricular systolic pressure from " tricuspid regurgitation is 27.4 mmHg.    Summary of old records / correspondence / consultant report:   Oncology 2/7/20 re: adenocarcinoma history and anemia        Request outside records:         ALLERGIES  No Known Allergies     PFSH:     The following portions of the patient's history were reviewed and updated as appropriate: Allergies / Current Medications / Past Medical History / Surgical History / Social History / Family History    PROBLEM LIST   Patient Active Problem List   Diagnosis   • Adenocarcinoma of esophagus (CMS/HCC)   • Adenomatous polyp of colon   • Malignant neoplasm of prostate (CMS/HCC)   • RLS (restless legs syndrome)   • Depression   • Hypertension   • Anti-phospholipid syndrome (CMS/HCC)   • Anemia   • Insomnia due to other mental disorder   • Peripheral polyneuropathy   • B12 deficiency   • Postsurgical malabsorption   • Shortness of breath   • Lymphedema   • Pneumonia of right lower lobe due to infectious organism (CMS/HCC)   • Iron deficiency   • Elevated troponin   • Gastroparesis       PAST MEDICAL HISTORY  Past Medical History:   Diagnosis Date   • Anemia    • Anti-phospholipid syndrome (CMS/HCC)     On lifelong anticoagulation therapy   • Bladder disorder     LEAKAGE   ON MED  wers pads   • Community acquired pneumonia     HISTORY OF IN 2014   • Deep venous thrombosis (CMS/HCC) 2006, 2008    Left lower extremity multiple   • Depression    • Esophageal carcinoma (CMS/HCC) 12/31/2014    had chemo and radiation prior surgery   • Hemorrhoids    • HH (hiatus hernia)    • History of atrial fibrillation 2015    ONE EPISODE WHILE HOSPITALIZED   • History of kidney stones    • History of nephrolithiasis    • History of pancreatitis     PT STATES MANY YEARS AGO   • History of radiation therapy    • Hypertension    • Long-term (current) use of anticoagulants, INR goal 2.0-3.0    • Lymphedema    • Malignant neoplasm of prostate (CMS/HCC)    • Other hyperlipidemia 1/30/2018 January 30, 2018  lipid panel risk 12.8%   • Restless legs syndrome    • Shortness of breath    • Squamous carcinoma     on the head       SURGICAL HISTORY  Past Surgical History:   Procedure Laterality Date   • APPENDECTOMY  1950   • BRONCHOSCOPY      (Diagnostic)   • CATARACT EXTRACTION Bilateral 2014   • COLONOSCOPY  12/15/2014    Complete / Description: EH, IH, torts, stool, follow-up colonoscopy due in 5 years.   • COLONOSCOPY N/A 6/13/2017    non-thrombosed external hemorrhoids, normal examined ileum, IH   • COLONOSCOPY N/A 2/26/2019    Procedure: COLONOSCOPY with Cold Polypectomy;  Surgeon: Mulugeta Millan MD;  Location: [facility] ENDOSCOPY;  Service: Gastroenterology   • CYSTOSCOPY W/ LASER LITHOTRIPSY     • ENDOSCOPY N/A 6/13/2017    Procedure: ESOPHAGOGASTRODUODENOSCOPY WITH COLD BIOPSY;  Surgeon: Lake Gonzalez MD;  Location: [facility] ENDOSCOPY;  Service:    • ESOPHAGECTOMY      April 2015, stage IIB esophageal carcinoma, sub-total resection.   • ESOPHAGECTOMY      Esophagectomy Subtotal [place] Procedure   • EXCISION LESION  08/2012    Removal of Squamous Cell CA on Head   • HAMMER TOE REPAIR  09/2014    Hammertoe Operation (Each Toe), 10/2014   • HAMMER TOE REPAIR Left 10/3/2017    Procedure: Left second third and fourth distal interphalangeal joint resection with flexor tenotomy;  Surgeon: Mulugeta Lira MD;  Location: [facility] OR OSC;  Service:    • HERNIA REPAIR      incisional   • JEJUNOSTOMY      Laparoscopic   • JEJUNOSTOMY      tube removal    • KNEE SURGERY Bilateral 1967, 1973, 1981   • PATELLA SURGERY Left     removed   • PILONIDAL CYST / SINUS EXCISION     • VT TOTAL KNEE ARTHROPLASTY Right 3/26/2018    Procedure: TOTAL KNEE ARTHROPLASTY;  Surgeon: Renny Solis MD;  Location: [facility] OR;  Service: Orthopedics   • PROSTATECTOMY  2010   • PYLOROPLASTY     • SPINAL FUSION  02/1998    C 5,6   • TONSILLECTOMY  1955   • TONSILLECTOMY     • UPPER GASTROINTESTINAL ENDOSCOPY  12/15/2014    LA Grade  D esophagitis, Pardo's, HH, multiple duodenal ulcers   • VENTRAL/INCISIONAL HERNIA REPAIR N/A 2016    Procedure: VENTRAL/INCISIONAL HERNIA REPAIR, open, with mesh, and component separation;  Surgeon: Darren Rivas MD;  Location: [facility] OR;  Service:        SOCIAL HISTORY  Social History     Socioeconomic History   • Marital status:      Spouse name: Lena   • Number of children: 0   • Years of education: College   • Highest education level: Not on file   Occupational History   • Occupation: Retired      Comment: Retired    Tobacco Use   • Smoking status: Former Smoker     Packs/day: 2.00     Years: 3.00     Pack years: 6.00     Types: Cigarettes     Last attempt to quit: 1974     Years since quittin.2   • Smokeless tobacco: Never Used   • Tobacco comment: no caffeine    Substance and Sexual Activity   • Alcohol use: Yes     Frequency: 2-3 times a week     Comment: 2-3 x per week   • Drug use: No   • Sexual activity: Defer   Social History Narrative    self-employed        FAMILY HISTORY  Family History   Problem Relation Age of Onset   • Cerebral aneurysm Mother         cerebral artery aneurysm ( age 56)   • Prostate cancer Brother 68   • Anxiety disorder Father    • Suicide Attempts Father          of suicide   • Cancer Father         bladder   • No Known Problems Brother    • Pancreatic cancer Nephew    • Colon cancer Neg Hx    • Esophageal cancer Neg Hx    • Dementia Neg Hx          **Dragon Disclaimer:   Much of this encounter note is an electronic transcription/translation of spoken language to printed text. The electronic translation of spoken language may permit erroneous, or at times, nonsensical words or phrases to be inadvertently transcribed. Although I have reviewed the note for such errors, some may still exist.     Template created by Moose Bolton MD

## 2020-03-25 ENCOUNTER — TELEPHONE (OUTPATIENT)
Dept: INTERNAL MEDICINE | Age: 70
End: 2020-03-25

## 2020-03-25 LAB
ANION GAP SERPL CALCULATED.3IONS-SCNC: 10.4 MMOL/L (ref 5–15)
BASOPHILS # BLD AUTO: 0.02 10*3/MM3 (ref 0–0.2)
BASOPHILS NFR BLD AUTO: 0.5 % (ref 0–1.5)
BUN BLD-MCNC: 19 MG/DL (ref 8–23)
BUN/CREAT SERPL: 20 (ref 7–25)
CALCIUM SPEC-SCNC: 9 MG/DL (ref 8.6–10.5)
CHLORIDE SERPL-SCNC: 103 MMOL/L (ref 98–107)
CO2 SERPL-SCNC: 26.6 MMOL/L (ref 22–29)
CREAT BLD-MCNC: 0.95 MG/DL (ref 0.76–1.27)
DEPRECATED RDW RBC AUTO: 49.7 FL (ref 37–54)
EOSINOPHIL # BLD AUTO: 0.22 10*3/MM3 (ref 0–0.4)
EOSINOPHIL NFR BLD AUTO: 5.9 % (ref 0.3–6.2)
ERYTHROCYTE [DISTWIDTH] IN BLOOD BY AUTOMATED COUNT: 15.8 % (ref 12.3–15.4)
GFR SERPL CREATININE-BSD FRML MDRD: 78 ML/MIN/1.73
GLUCOSE BLD-MCNC: 90 MG/DL (ref 65–99)
HCT VFR BLD AUTO: 26.6 % (ref 37.5–51)
HGB BLD-MCNC: 8.8 G/DL (ref 13–17.7)
IMM GRANULOCYTES # BLD AUTO: 0.01 10*3/MM3 (ref 0–0.05)
IMM GRANULOCYTES NFR BLD AUTO: 0.3 % (ref 0–0.5)
INR PPP: 3.4 (ref 0.9–1.1)
LYMPHOCYTES # BLD AUTO: 1.34 10*3/MM3 (ref 0.7–3.1)
LYMPHOCYTES NFR BLD AUTO: 35.7 % (ref 19.6–45.3)
MAGNESIUM SERPL-MCNC: 1.8 MG/DL (ref 1.6–2.4)
MCH RBC QN AUTO: 29 PG (ref 26.6–33)
MCHC RBC AUTO-ENTMCNC: 33.1 G/DL (ref 31.5–35.7)
MCV RBC AUTO: 87.8 FL (ref 79–97)
MONOCYTES # BLD AUTO: 0.36 10*3/MM3 (ref 0.1–0.9)
MONOCYTES NFR BLD AUTO: 9.6 % (ref 5–12)
NEUTROPHILS # BLD AUTO: 1.8 10*3/MM3 (ref 1.7–7)
NEUTROPHILS NFR BLD AUTO: 48 % (ref 42.7–76)
NRBC BLD AUTO-RTO: 0 /100 WBC (ref 0–0.2)
PLATELET # BLD AUTO: 151 10*3/MM3 (ref 140–450)
PMV BLD AUTO: 9.6 FL (ref 6–12)
POTASSIUM BLD-SCNC: 4.1 MMOL/L (ref 3.5–5.2)
PROCALCITONIN SERPL-MCNC: 0.08 NG/ML (ref 0.1–0.25)
PROTHROMBIN TIME: 34.1 SECONDS (ref 11.7–14.2)
RBC # BLD AUTO: 3.03 10*6/MM3 (ref 4.14–5.8)
SODIUM BLD-SCNC: 140 MMOL/L (ref 136–145)
TROPONIN T SERPL-MCNC: 0.03 NG/ML (ref 0–0.03)
WBC NRBC COR # BLD: 3.75 10*3/MM3 (ref 3.4–10.8)

## 2020-03-25 PROCEDURE — 85598 HEXAGNAL PHOSPH PLTLT NEUTRL: CPT | Performed by: INTERNAL MEDICINE

## 2020-03-25 PROCEDURE — 86147 CARDIOLIPIN ANTIBODY EA IG: CPT | Performed by: INTERNAL MEDICINE

## 2020-03-25 PROCEDURE — 99222 1ST HOSP IP/OBS MODERATE 55: CPT | Performed by: INTERNAL MEDICINE

## 2020-03-25 PROCEDURE — 86146 BETA-2 GLYCOPROTEIN ANTIBODY: CPT | Performed by: INTERNAL MEDICINE

## 2020-03-25 PROCEDURE — 85670 THROMBIN TIME PLASMA: CPT | Performed by: INTERNAL MEDICINE

## 2020-03-25 PROCEDURE — 25010000002 CEFTRIAXONE PER 250 MG: Performed by: INTERNAL MEDICINE

## 2020-03-25 PROCEDURE — 84484 ASSAY OF TROPONIN QUANT: CPT | Performed by: INTERNAL MEDICINE

## 2020-03-25 PROCEDURE — 84145 PROCALCITONIN (PCT): CPT | Performed by: INTERNAL MEDICINE

## 2020-03-25 PROCEDURE — 25010000002 FUROSEMIDE PER 20 MG: Performed by: INTERNAL MEDICINE

## 2020-03-25 PROCEDURE — 83735 ASSAY OF MAGNESIUM: CPT | Performed by: INTERNAL MEDICINE

## 2020-03-25 PROCEDURE — 97161 PT EVAL LOW COMPLEX 20 MIN: CPT

## 2020-03-25 PROCEDURE — 85025 COMPLETE CBC W/AUTO DIFF WBC: CPT | Performed by: INTERNAL MEDICINE

## 2020-03-25 PROCEDURE — 85732 THROMBOPLASTIN TIME PARTIAL: CPT | Performed by: INTERNAL MEDICINE

## 2020-03-25 PROCEDURE — 85613 RUSSELL VIPER VENOM DILUTED: CPT | Performed by: INTERNAL MEDICINE

## 2020-03-25 PROCEDURE — 85610 PROTHROMBIN TIME: CPT | Performed by: INTERNAL MEDICINE

## 2020-03-25 PROCEDURE — 85705 THROMBOPLASTIN INHIBITION: CPT | Performed by: INTERNAL MEDICINE

## 2020-03-25 PROCEDURE — 80048 BASIC METABOLIC PNL TOTAL CA: CPT | Performed by: INTERNAL MEDICINE

## 2020-03-25 PROCEDURE — 97110 THERAPEUTIC EXERCISES: CPT

## 2020-03-25 RX ORDER — FUROSEMIDE 10 MG/ML
40 INJECTION INTRAMUSCULAR; INTRAVENOUS ONCE
Status: COMPLETED | OUTPATIENT
Start: 2020-03-25 | End: 2020-03-25

## 2020-03-25 RX ORDER — PRAMIPEXOLE DIHYDROCHLORIDE 1.5 MG/1
1.5 TABLET ORAL NIGHTLY
Status: DISCONTINUED | OUTPATIENT
Start: 2020-03-25 | End: 2020-03-27 | Stop reason: HOSPADM

## 2020-03-25 RX ADMIN — SODIUM CHLORIDE, PRESERVATIVE FREE 10 ML: 5 INJECTION INTRAVENOUS at 09:41

## 2020-03-25 RX ADMIN — PANTOPRAZOLE SODIUM 40 MG: 40 TABLET, DELAYED RELEASE ORAL at 18:08

## 2020-03-25 RX ADMIN — PRAMIPEXOLE DIHYDROCHLORIDE 1.5 MG: 1.5 TABLET ORAL at 20:47

## 2020-03-25 RX ADMIN — FERROUS SULFATE TAB 325 MG (65 MG ELEMENTAL FE) 325 MG: 325 (65 FE) TAB at 08:10

## 2020-03-25 RX ADMIN — DOXYCYCLINE 100 MG: 100 INJECTION, POWDER, LYOPHILIZED, FOR SOLUTION INTRAVENOUS at 08:10

## 2020-03-25 RX ADMIN — FUROSEMIDE 40 MG: 10 INJECTION, SOLUTION INTRAMUSCULAR; INTRAVENOUS at 15:13

## 2020-03-25 RX ADMIN — PAROXETINE HYDROCHLORIDE HEMIHYDRATE 40 MG: 20 TABLET, FILM COATED ORAL at 06:28

## 2020-03-25 RX ADMIN — DOXYCYCLINE 100 MG: 100 INJECTION, POWDER, LYOPHILIZED, FOR SOLUTION INTRAVENOUS at 20:47

## 2020-03-25 RX ADMIN — PANTOPRAZOLE SODIUM 40 MG: 40 TABLET, DELAYED RELEASE ORAL at 06:28

## 2020-03-25 RX ADMIN — METOPROLOL TARTRATE 12.5 MG: 25 TABLET ORAL at 20:47

## 2020-03-25 RX ADMIN — FUROSEMIDE 20 MG: 20 TABLET ORAL at 08:10

## 2020-03-25 RX ADMIN — SODIUM CHLORIDE, PRESERVATIVE FREE 10 ML: 5 INJECTION INTRAVENOUS at 20:50

## 2020-03-25 RX ADMIN — TAMSULOSIN HYDROCHLORIDE 0.4 MG: 0.4 CAPSULE ORAL at 20:47

## 2020-03-25 RX ADMIN — TRAZODONE HYDROCHLORIDE 100 MG: 100 TABLET ORAL at 20:47

## 2020-03-25 RX ADMIN — CEFTRIAXONE SODIUM 1 G: 1 INJECTION, SOLUTION INTRAVENOUS at 18:08

## 2020-03-26 ENCOUNTER — TELEPHONE (OUTPATIENT)
Dept: INTERNAL MEDICINE | Age: 70
End: 2020-03-26

## 2020-03-26 ENCOUNTER — APPOINTMENT (OUTPATIENT)
Dept: GENERAL RADIOLOGY | Facility: HOSPITAL | Age: 70
End: 2020-03-26

## 2020-03-26 PROBLEM — D68.61 ANTI-PHOSPHOLIPID SYNDROME: Chronic | Status: RESOLVED | Noted: 2018-05-10 | Resolved: 2020-03-26

## 2020-03-26 LAB
ALBUMIN SERPL-MCNC: 2.9 G/DL (ref 3.5–5.2)
ALBUMIN/GLOB SERPL: 1.1 G/DL
ALP SERPL-CCNC: 105 U/L (ref 39–117)
ALT SERPL W P-5'-P-CCNC: 19 U/L (ref 1–41)
ANION GAP SERPL CALCULATED.3IONS-SCNC: 8 MMOL/L (ref 5–15)
ANION GAP SERPL CALCULATED.3IONS-SCNC: 8.6 MMOL/L (ref 5–15)
AST SERPL-CCNC: 26 U/L (ref 1–40)
BASOPHILS # BLD AUTO: 0.02 10*3/MM3 (ref 0–0.2)
BASOPHILS NFR BLD AUTO: 0.7 % (ref 0–1.5)
BILIRUB SERPL-MCNC: 0.3 MG/DL (ref 0.2–1.2)
BUN BLD-MCNC: 17 MG/DL (ref 8–23)
BUN BLD-MCNC: 18 MG/DL (ref 8–23)
BUN/CREAT SERPL: 17.9 (ref 7–25)
BUN/CREAT SERPL: 20.5 (ref 7–25)
CALCIUM SPEC-SCNC: 8.3 MG/DL (ref 8.6–10.5)
CALCIUM SPEC-SCNC: 8.6 MG/DL (ref 8.6–10.5)
CARDIOLIPIN IGG SER IA-ACNC: 15 GPL U/ML (ref 0–14)
CARDIOLIPIN IGM SER IA-ACNC: 15 MPL U/ML (ref 0–12)
CHLORIDE SERPL-SCNC: 101 MMOL/L (ref 98–107)
CHLORIDE SERPL-SCNC: 102 MMOL/L (ref 98–107)
CO2 SERPL-SCNC: 27.4 MMOL/L (ref 22–29)
CO2 SERPL-SCNC: 28 MMOL/L (ref 22–29)
CREAT BLD-MCNC: 0.88 MG/DL (ref 0.76–1.27)
CREAT BLD-MCNC: 0.95 MG/DL (ref 0.76–1.27)
DEPRECATED RDW RBC AUTO: 50.6 FL (ref 37–54)
EOSINOPHIL # BLD AUTO: 0.24 10*3/MM3 (ref 0–0.4)
EOSINOPHIL NFR BLD AUTO: 8.3 % (ref 0.3–6.2)
ERYTHROCYTE [DISTWIDTH] IN BLOOD BY AUTOMATED COUNT: 15.5 % (ref 12.3–15.4)
GFR SERPL CREATININE-BSD FRML MDRD: 78 ML/MIN/1.73
GFR SERPL CREATININE-BSD FRML MDRD: 85 ML/MIN/1.73
GLOBULIN UR ELPH-MCNC: 2.7 GM/DL
GLUCOSE BLD-MCNC: 91 MG/DL (ref 65–99)
GLUCOSE BLD-MCNC: 96 MG/DL (ref 65–99)
HCT VFR BLD AUTO: 24.9 % (ref 37.5–51)
HGB BLD-MCNC: 8.3 G/DL (ref 13–17.7)
IMM GRANULOCYTES # BLD AUTO: 0.01 10*3/MM3 (ref 0–0.05)
IMM GRANULOCYTES NFR BLD AUTO: 0.3 % (ref 0–0.5)
INR PPP: 2.41 (ref 0.9–1.1)
LYMPHOCYTES # BLD AUTO: 0.92 10*3/MM3 (ref 0.7–3.1)
LYMPHOCYTES NFR BLD AUTO: 31.8 % (ref 19.6–45.3)
MAGNESIUM SERPL-MCNC: 2 MG/DL (ref 1.6–2.4)
MCH RBC QN AUTO: 29.7 PG (ref 26.6–33)
MCHC RBC AUTO-ENTMCNC: 33.3 G/DL (ref 31.5–35.7)
MCV RBC AUTO: 89.2 FL (ref 79–97)
MONOCYTES # BLD AUTO: 0.32 10*3/MM3 (ref 0.1–0.9)
MONOCYTES NFR BLD AUTO: 11.1 % (ref 5–12)
NEUTROPHILS # BLD AUTO: 1.38 10*3/MM3 (ref 1.7–7)
NEUTROPHILS NFR BLD AUTO: 47.8 % (ref 42.7–76)
NRBC BLD AUTO-RTO: 0 /100 WBC (ref 0–0.2)
PLATELET # BLD AUTO: 126 10*3/MM3 (ref 140–450)
PMV BLD AUTO: 9.9 FL (ref 6–12)
POTASSIUM BLD-SCNC: 4.1 MMOL/L (ref 3.5–5.2)
POTASSIUM BLD-SCNC: 4.1 MMOL/L (ref 3.5–5.2)
PROCALCITONIN SERPL-MCNC: 0.08 NG/ML (ref 0.1–0.25)
PROT SERPL-MCNC: 5.6 G/DL (ref 6–8.5)
PROTHROMBIN TIME: 25.9 SECONDS (ref 11.7–14.2)
RBC # BLD AUTO: 2.79 10*6/MM3 (ref 4.14–5.8)
SODIUM BLD-SCNC: 137 MMOL/L (ref 136–145)
SODIUM BLD-SCNC: 138 MMOL/L (ref 136–145)
WBC NRBC COR # BLD: 2.89 10*3/MM3 (ref 3.4–10.8)

## 2020-03-26 PROCEDURE — 71046 X-RAY EXAM CHEST 2 VIEWS: CPT

## 2020-03-26 PROCEDURE — 36415 COLL VENOUS BLD VENIPUNCTURE: CPT | Performed by: INTERNAL MEDICINE

## 2020-03-26 PROCEDURE — 85610 PROTHROMBIN TIME: CPT | Performed by: INTERNAL MEDICINE

## 2020-03-26 PROCEDURE — 83735 ASSAY OF MAGNESIUM: CPT | Performed by: INTERNAL MEDICINE

## 2020-03-26 PROCEDURE — 99232 SBSQ HOSP IP/OBS MODERATE 35: CPT | Performed by: INTERNAL MEDICINE

## 2020-03-26 PROCEDURE — 80053 COMPREHEN METABOLIC PANEL: CPT | Performed by: INTERNAL MEDICINE

## 2020-03-26 PROCEDURE — 84145 PROCALCITONIN (PCT): CPT | Performed by: INTERNAL MEDICINE

## 2020-03-26 PROCEDURE — 85025 COMPLETE CBC W/AUTO DIFF WBC: CPT | Performed by: INTERNAL MEDICINE

## 2020-03-26 RX ADMIN — DOXYCYCLINE 100 MG: 100 INJECTION, POWDER, LYOPHILIZED, FOR SOLUTION INTRAVENOUS at 09:19

## 2020-03-26 RX ADMIN — METOPROLOL TARTRATE 12.5 MG: 25 TABLET ORAL at 09:18

## 2020-03-26 RX ADMIN — TAMSULOSIN HYDROCHLORIDE 0.4 MG: 0.4 CAPSULE ORAL at 21:24

## 2020-03-26 RX ADMIN — SODIUM CHLORIDE, PRESERVATIVE FREE 10 ML: 5 INJECTION INTRAVENOUS at 21:24

## 2020-03-26 RX ADMIN — PAROXETINE HYDROCHLORIDE HEMIHYDRATE 40 MG: 20 TABLET, FILM COATED ORAL at 06:19

## 2020-03-26 RX ADMIN — TRAZODONE HYDROCHLORIDE 100 MG: 100 TABLET ORAL at 21:24

## 2020-03-26 RX ADMIN — PRAMIPEXOLE DIHYDROCHLORIDE 1.5 MG: 1.5 TABLET ORAL at 21:24

## 2020-03-26 RX ADMIN — APIXABAN 10 MG: 5 TABLET, FILM COATED ORAL at 21:23

## 2020-03-26 RX ADMIN — FERROUS SULFATE TAB 325 MG (65 MG ELEMENTAL FE) 325 MG: 325 (65 FE) TAB at 09:18

## 2020-03-26 RX ADMIN — SODIUM CHLORIDE, PRESERVATIVE FREE 10 ML: 5 INJECTION INTRAVENOUS at 09:20

## 2020-03-26 RX ADMIN — FUROSEMIDE 20 MG: 20 TABLET ORAL at 09:18

## 2020-03-26 RX ADMIN — PANTOPRAZOLE SODIUM 40 MG: 40 TABLET, DELAYED RELEASE ORAL at 09:19

## 2020-03-26 RX ADMIN — METOPROLOL TARTRATE 12.5 MG: 25 TABLET ORAL at 21:24

## 2020-03-26 RX ADMIN — PANTOPRAZOLE SODIUM 40 MG: 40 TABLET, DELAYED RELEASE ORAL at 17:03

## 2020-03-27 ENCOUNTER — TELEPHONE (OUTPATIENT)
Dept: ONCOLOGY | Facility: CLINIC | Age: 70
End: 2020-03-27

## 2020-03-27 VITALS
DIASTOLIC BLOOD PRESSURE: 80 MMHG | RESPIRATION RATE: 16 BRPM | HEART RATE: 58 BPM | OXYGEN SATURATION: 94 % | TEMPERATURE: 98.7 F | BODY MASS INDEX: 26.7 KG/M2 | WEIGHT: 226.1 LBS | HEIGHT: 77 IN | SYSTOLIC BLOOD PRESSURE: 140 MMHG

## 2020-03-27 DIAGNOSIS — C15.9 ADENOCARCINOMA OF ESOPHAGUS (HCC): ICD-10-CM

## 2020-03-27 DIAGNOSIS — R59.1 LYMPHADENOPATHY: Primary | ICD-10-CM

## 2020-03-27 LAB
ANION GAP SERPL CALCULATED.3IONS-SCNC: 8.7 MMOL/L (ref 5–15)
APTT HEX PL PPP: 0 SEC (ref 0–11)
B2 GLYCOPROT1 IGA SER-ACNC: <9 GPI IGA UNITS (ref 0–25)
B2 GLYCOPROT1 IGG SER-ACNC: <9 GPI IGG UNITS (ref 0–20)
B2 GLYCOPROT1 IGM SER-ACNC: <9 GPI IGM UNITS (ref 0–32)
BASOPHILS # BLD AUTO: 0.02 10*3/MM3 (ref 0–0.2)
BASOPHILS NFR BLD AUTO: 0.6 % (ref 0–1.5)
BUN BLD-MCNC: 16 MG/DL (ref 8–23)
BUN/CREAT SERPL: 16.5 (ref 7–25)
CALCIUM SPEC-SCNC: 8.3 MG/DL (ref 8.6–10.5)
CHLORIDE SERPL-SCNC: 103 MMOL/L (ref 98–107)
CO2 SERPL-SCNC: 26.3 MMOL/L (ref 22–29)
CREAT BLD-MCNC: 0.97 MG/DL (ref 0.76–1.27)
DEPRECATED RDW RBC AUTO: 50.4 FL (ref 37–54)
DRVVT IMM 1:2 NP PPP: 42.2 SEC (ref 0–47)
EOSINOPHIL # BLD AUTO: 0.29 10*3/MM3 (ref 0–0.4)
EOSINOPHIL NFR BLD AUTO: 9 % (ref 0.3–6.2)
ERYTHROCYTE [DISTWIDTH] IN BLOOD BY AUTOMATED COUNT: 15.5 % (ref 12.3–15.4)
GFR SERPL CREATININE-BSD FRML MDRD: 76 ML/MIN/1.73
GLUCOSE BLD-MCNC: 88 MG/DL (ref 65–99)
HCT VFR BLD AUTO: 25.1 % (ref 37.5–51)
HGB BLD-MCNC: 8.3 G/DL (ref 13–17.7)
IMM GRANULOCYTES # BLD AUTO: 0 10*3/MM3 (ref 0–0.05)
IMM GRANULOCYTES NFR BLD AUTO: 0 % (ref 0–0.5)
INR PPP: 2.28 (ref 0.9–1.1)
LA NT DPL PPP: 97.1 SEC (ref 0–55)
LA NT DPL/LA NT HPL PPP-RTO: 0.91 RATIO (ref 0–1.4)
LA NT PLATELET PPP: 71.6 SEC (ref 0–51.9)
LUPUS ANTICOAGULANT REFLEX: ABNORMAL
LYMPHOCYTES # BLD AUTO: 0.86 10*3/MM3 (ref 0.7–3.1)
LYMPHOCYTES NFR BLD AUTO: 26.8 % (ref 19.6–45.3)
MCH RBC QN AUTO: 29.4 PG (ref 26.6–33)
MCHC RBC AUTO-ENTMCNC: 33.1 G/DL (ref 31.5–35.7)
MCV RBC AUTO: 89 FL (ref 79–97)
MONOCYTES # BLD AUTO: 0.32 10*3/MM3 (ref 0.1–0.9)
MONOCYTES NFR BLD AUTO: 10 % (ref 5–12)
NEUTROPHILS # BLD AUTO: 1.72 10*3/MM3 (ref 1.7–7)
NEUTROPHILS NFR BLD AUTO: 53.6 % (ref 42.7–76)
NRBC BLD AUTO-RTO: 0 /100 WBC (ref 0–0.2)
PLATELET # BLD AUTO: 117 10*3/MM3 (ref 140–450)
PMV BLD AUTO: 10 FL (ref 6–12)
POTASSIUM BLD-SCNC: 4 MMOL/L (ref 3.5–5.2)
PROTHROMBIN TIME: 24.8 SECONDS (ref 11.7–14.2)
PTT LA MIX: 54.5 SEC (ref 0–48.9)
RBC # BLD AUTO: 2.82 10*6/MM3 (ref 4.14–5.8)
SCREEN DRVVT: 58.5 SEC (ref 0–47)
SODIUM BLD-SCNC: 138 MMOL/L (ref 136–145)
THROMBIN TIME: 15.9 SEC (ref 0–23)
WBC NRBC COR # BLD: 3.21 10*3/MM3 (ref 3.4–10.8)

## 2020-03-27 PROCEDURE — 85610 PROTHROMBIN TIME: CPT | Performed by: INTERNAL MEDICINE

## 2020-03-27 PROCEDURE — 80048 BASIC METABOLIC PNL TOTAL CA: CPT | Performed by: INTERNAL MEDICINE

## 2020-03-27 PROCEDURE — 97116 GAIT TRAINING THERAPY: CPT

## 2020-03-27 PROCEDURE — 36415 COLL VENOUS BLD VENIPUNCTURE: CPT | Performed by: INTERNAL MEDICINE

## 2020-03-27 PROCEDURE — 85025 COMPLETE CBC W/AUTO DIFF WBC: CPT | Performed by: INTERNAL MEDICINE

## 2020-03-27 RX ADMIN — APIXABAN 10 MG: 5 TABLET, FILM COATED ORAL at 08:36

## 2020-03-27 RX ADMIN — FUROSEMIDE 20 MG: 20 TABLET ORAL at 08:37

## 2020-03-27 RX ADMIN — FERROUS SULFATE TAB 325 MG (65 MG ELEMENTAL FE) 325 MG: 325 (65 FE) TAB at 08:37

## 2020-03-27 RX ADMIN — SODIUM CHLORIDE, PRESERVATIVE FREE 10 ML: 5 INJECTION INTRAVENOUS at 08:37

## 2020-03-27 RX ADMIN — METOPROLOL TARTRATE 12.5 MG: 25 TABLET ORAL at 08:36

## 2020-03-27 RX ADMIN — PAROXETINE HYDROCHLORIDE HEMIHYDRATE 40 MG: 20 TABLET, FILM COATED ORAL at 06:35

## 2020-03-27 RX ADMIN — PANTOPRAZOLE SODIUM 40 MG: 40 TABLET, DELAYED RELEASE ORAL at 06:36

## 2020-03-27 NOTE — TELEPHONE ENCOUNTER
PT CALLING WAS JUST DC'D FROM HOSP. ASKING ABOUT RX FOR ELIQUIS. HE SAID IT HAS ALREADY BEEN SENT IN. TOLD HIM HE NEEDS TO TAKE IT AS PRESCRIBED. PT V/U.

## 2020-03-27 NOTE — TELEPHONE ENCOUNTER
Patient was just discharged from hospital for a DVT and wants to know how to take eliquis and how medication is going to work for the DVT and what precautions patient needs to take       Call patient back at [phone]

## 2020-03-28 ENCOUNTER — READMISSION MANAGEMENT (OUTPATIENT)
Dept: CALL CENTER | Facility: HOSPITAL | Age: 70
End: 2020-03-28

## 2020-03-28 NOTE — OUTREACH NOTE
Prep Survey      Responses   [facility] facility patient discharged from?  [place]   Is LACE score < 7 ?  No   Eligibility  Readm Mgmt   Discharge diagnosis  Acute deep vein thrombosis (DVT) of popliteal vein of left lower extremity   Does the patient have one of the following disease processes/diagnoses(primary or secondary)?  Other   Does the patient have Home health ordered?  No   Is there a DME ordered?  No   Prep survey completed?  Yes          Edith Gamez RN

## 2020-03-29 LAB
BACTERIA SPEC AEROBE CULT: NORMAL
BACTERIA SPEC AEROBE CULT: NORMAL

## 2020-03-31 ENCOUNTER — OFFICE VISIT (OUTPATIENT)
Dept: INTERNAL MEDICINE | Age: 70
End: 2020-03-31

## 2020-03-31 VITALS
SYSTOLIC BLOOD PRESSURE: 130 MMHG | OXYGEN SATURATION: 96 % | WEIGHT: 210 LBS | HEART RATE: 62 BPM | TEMPERATURE: 97.2 F | DIASTOLIC BLOOD PRESSURE: 60 MMHG | HEIGHT: 77 IN | BODY MASS INDEX: 24.79 KG/M2

## 2020-03-31 DIAGNOSIS — I10 ESSENTIAL HYPERTENSION: Chronic | ICD-10-CM

## 2020-03-31 DIAGNOSIS — E53.8 B12 DEFICIENCY: Chronic | ICD-10-CM

## 2020-03-31 DIAGNOSIS — D64.9 ANEMIA, UNSPECIFIED TYPE: ICD-10-CM

## 2020-03-31 DIAGNOSIS — I82.432 ACUTE DEEP VEIN THROMBOSIS (DVT) OF POPLITEAL VEIN OF LEFT LOWER EXTREMITY (HCC): Primary | ICD-10-CM

## 2020-03-31 PROBLEM — J18.9 PNEUMONIA OF RIGHT LOWER LOBE DUE TO INFECTIOUS ORGANISM: Status: RESOLVED | Noted: 2020-02-10 | Resolved: 2020-03-31

## 2020-03-31 PROCEDURE — 96372 THER/PROPH/DIAG INJ SC/IM: CPT | Performed by: INTERNAL MEDICINE

## 2020-03-31 PROCEDURE — 99214 OFFICE O/P EST MOD 30 MIN: CPT | Performed by: INTERNAL MEDICINE

## 2020-03-31 RX ADMIN — CYANOCOBALAMIN 1000 MCG: 1000 INJECTION, SOLUTION INTRAMUSCULAR; SUBCUTANEOUS at 13:00

## 2020-03-31 NOTE — PROGRESS NOTES
"[facility] [place] [facility]  RICHARDSON BOLTON M.D.    Jose LITTLE Hurtado / 70 y.o. / male  03/31/2020      CC:   hospital f/u Acute deep vein thrombosis (DVT) of popliteal v (3/24/20-3/27/20)      VITALS    Visit Vitals  /60   Pulse 62   Temp 97.2 °F (36.2 °C)   Ht 195.6 cm (77\")   Wt 95.3 kg (210 lb)   SpO2 96%   BMI 24.90 kg/m²       BP Readings from Last 3 Encounters:   03/31/20 130/60   03/27/20 140/80   03/24/20 140/66     Wt Readings from Last 3 Encounters:   03/31/20 95.3 kg (210 lb)   03/24/20 103 kg (226 lb 1.6 oz)   03/24/20 101 kg (222 lb)      Body mass index is 24.9 kg/m².    HPI:     Date of admission/discharge: As noted above in CC  [facility]  Principle Dx: DVT of the LLE (while on supratherapeutic INR with warfarin)  Secondary Dx: anemia and hypertension   History prior to hospitalization: severe swelling of BLE; INR 3.9 on warfarin.   Evaluation/Treatment: Venous duplex showed acute DVT of LLE; seen by hematologist and placed on Eliquis. Hematology is aware of his stable anemia.   Course: swelling of the legs have decreased, no bleeding/bruising on Eliquis     Patient Care Team:  Brandin Bolton MD as PCP - General (Internal Medicine)  George Phelan II, MD as Consulting Physician (Hematology and Oncology)  Jose Inman MD as Consulting Physician (Urology)  Mulugeta Millan MD as Consulting Physician (Gastroenterology)  Seth Randhawa MD as Consulting Physician (Ophthalmology)  ____________________________________________________________________    ASSESSMENT & PLAN:    1. Acute deep vein thrombosis (DVT) of popliteal vein of left lower extremity (CMS/HCC)    2. B12 deficiency    3. Essential hypertension    4. Anemia, unspecified type      No orders of the defined types were placed in this encounter.      Summary/Discussion:  Continue Eliquis and follow-up with hematology   Instructed to weigh daily and watch for chest pain and shortness of breath   Follow-up with " heme re: anemia and Eliquis   B12 shot today     Return in about 3 months (around 6/30/2020) for Reassess chronic medical problems.    ____________________________________________________________________    REVIEW OF SYSTEMS    Review of Systems  Weight loss, stable since hospital discharge  Neg for fever,  Cough or increased shortness of breath   Decreased swelling of legs, no pain of legs  GI neg for bleedin    PHYSICAL EXAMINATION    Physical Exam  No acute distress   Lungs clear to auscultation without rales  Heart sounds are regular  Lower extremities with decreased edema     REVIEWED DATA:    Labs:   Lab Results   Component Value Date    WBC 3.21 (L) 03/27/2020    HGB 8.3 (L) 03/27/2020     (L) 03/27/2020     Lab Results   Component Value Date    CREATININE 0.97 03/27/2020    CREATININE 0.95 03/26/2020    CREATININE 0.88 03/26/2020    EGFRIFNONA 76 03/27/2020    EGFRIFAFRI 97 05/10/2018        Imaging:   Ct Chest With Contrast    Result Date: 3/20/2020  Narrative: CT CHEST, ABDOMEN AND PELVIS WITH CONTRAST  HISTORY: 70-year-old male with follow-up for esophageal malignancy. For restaging.  TECHNIQUE: Axial CT images of the chest abdomen and pelvis were obtained following administration of intravenous and oral contrast. Coronal and sagittal reconstructions were then obtained.  COMPARISON: CT angiogram of the chest from 01/15/2020.  FINDINGS:  CT CHEST: Postoperative change from esophagectomy with gastric pull-through procedure are present. Large amount of food debris is seen within the stomach. Interval increase in left paratracheal node seen on image 112 that today measures up to 8 mm in short axis dimension compared to prior measurement of 5-6 mm. Subcarinal lymph node today measures 1.5 cm in short axis dimension compared to prior measurement of 1.2 cm. A right hilar lymph node measures up to 1.1 cm. There is a small loculated right pleural effusion associated with pleural thickening without  significant interim change from prior imaging. The central airways are patent without endobronchial lesion. Right lower lobe medial atelectasis secondary to gastric pull-through procedure. There are additional areas of atelectasis and scarring within the right middle and lower lobe that are unchanged from most recent CT however are increased from prior imaging in 2018. There is also a small layering left pleural effusion that appears simple on CT imaging. Small ground glass peripheral infiltrate/reticulonodular opacities is seen within the inferior left lower lobe. There is no pathological axillary lymphadenopathy.  CT ABDOMEN AND PELVIS: The liver demonstrates normal attenuation. The gallbladder, spleen and the pancreas is normal. Bilateral adrenal glands are normal. Both kidneys are normal in size and attenuation. No evidence of bowel obstruction. The urinary bladder is partially distended and normal. Barium contrast from patient's video swallow procedure is seen throughout the colon. There is no pathological retroperitoneal lymphadenopathy. Bones are unremarkable. Left sacral ala fracture that is new from prior imaging in 2018.      Impression: 1. Stable small loculated right pleural effusion with pleural thickening. Right basilar atelectatic and scarring changes have increased in the interim from prior imaging in 2018. There is a small new left pleural effusion and ground glass infiltrates within the inferior left lower lobe. Mildly enlarged mediastinal lymph nodes. Attention on follow-up is recommended. 2. Left sacral ala insufficiency fracture.  Radiation dose reduction techniques were utilized, including automated exposure control and exposure modulation based on body size.  This report was finalized on 3/20/2020 10:13 AM by Dr. Clemente May M.D.      Ct Abdomen Pelvis With Contrast    Result Date: 3/20/2020  Narrative: CT CHEST, ABDOMEN AND PELVIS WITH CONTRAST  HISTORY: 70-year-old male with follow-up  for esophageal malignancy. For restaging.  TECHNIQUE: Axial CT images of the chest abdomen and pelvis were obtained following administration of intravenous and oral contrast. Coronal and sagittal reconstructions were then obtained.  COMPARISON: CT angiogram of the chest from 01/15/2020.  FINDINGS:  CT CHEST: Postoperative change from esophagectomy with gastric pull-through procedure are present. Large amount of food debris is seen within the stomach. Interval increase in left paratracheal node seen on image 112 that today measures up to 8 mm in short axis dimension compared to prior measurement of 5-6 mm. Subcarinal lymph node today measures 1.5 cm in short axis dimension compared to prior measurement of 1.2 cm. A right hilar lymph node measures up to 1.1 cm. There is a small loculated right pleural effusion associated with pleural thickening without significant interim change from prior imaging. The central airways are patent without endobronchial lesion. Right lower lobe medial atelectasis secondary to gastric pull-through procedure. There are additional areas of atelectasis and scarring within the right middle and lower lobe that are unchanged from most recent CT however are increased from prior imaging in 2018. There is also a small layering left pleural effusion that appears simple on CT imaging. Small ground glass peripheral infiltrate/reticulonodular opacities is seen within the inferior left lower lobe. There is no pathological axillary lymphadenopathy.  CT ABDOMEN AND PELVIS: The liver demonstrates normal attenuation. The gallbladder, spleen and the pancreas is normal. Bilateral adrenal glands are normal. Both kidneys are normal in size and attenuation. No evidence of bowel obstruction. The urinary bladder is partially distended and normal. Barium contrast from patient's video swallow procedure is seen throughout the colon. There is no pathological retroperitoneal lymphadenopathy. Bones are unremarkable.  Left sacral ala fracture that is new from prior imaging in 2018.      Impression: 1. Stable small loculated right pleural effusion with pleural thickening. Right basilar atelectatic and scarring changes have increased in the interim from prior imaging in 2018. There is a small new left pleural effusion and ground glass infiltrates within the inferior left lower lobe. Mildly enlarged mediastinal lymph nodes. Attention on follow-up is recommended. 2. Left sacral ala insufficiency fracture.  Radiation dose reduction techniques were utilized, including automated exposure control and exposure modulation based on body size.  This report was finalized on 3/20/2020 10:13 AM by Dr. Clemente May M.D.      Fl Video Swallow With Speech Single Contrast    Result Date: 3/16/2020  Narrative: VIDEO ESOPHAGRAM  HISTORY: Dysphagia.  FINDINGS: The patient exhibits mild oropharyngeal dysphagia with mistiming of swallowing. There was some penetration occurring with straw sips of thin liquid. With a large bite pureed there was also some penetration from pharyngeal residuals. Please also see the speech therapist's report.  Twelve imaging sequences were obtained and the fluoroscopy time measures 5 minutes.  This report was finalized on 3/16/2020 7:07 PM by Dr. Mike Villasenor M.D.      Ct Angiogram Chest    Result Date: 3/24/2020  Narrative: CT ANGIOGRAM CHEST-  INDICATIONS: Acute venous thrombosis, short of breath  Radiation dose reduction techniques were utilized, including automated exposure control and exposure modulation based on body size.  TECHNIQUE: CT angiography of the chest with three-dimensional reconstructions  COMPARISON: 03/18/2020  FINDINGS:  No pulmonary embolism. No aortic dissection.  The heart size is borderline without pericardial effusion. A previously noted increased left paratracheal lymph node is again noted and may be reactive in nature or potentially evidence of neoplasm. Likewise, enlarged subcarinal node is  redemonstrated. Gastric pull-through changes are redemonstrated. The residual esophagus is patulous, and the stomach is debris filled.  The airways appear clear.  Persistent loculated right pleural effusion, and minimal left pleural effusion,  similar to the prior exam.  The lungs show no focal pulmonary consolidation or mass. Atelectasis is again demonstrated in the right mid to lower lung appears similar to the prior exam. Increased density dependently in the left lower lung may be increased atelectasis or developing infiltrate, follow-up recommended.  Upper abdominal structures show no acute findings.  Degenerative changes are seen in the spine. No acute fracture is identified.      Impression:   1. No pulmonary embolism.  2. Mildly increased opacity in the left lower lobe at the lung may represent developing infiltrate, follow-up recommended.   3. Other stable findings, as described.  This report was finalized on 3/24/2020 3:11 PM by Dr. Sumanth Boyer M.D.      Xr Chest Pa & Lateral    Result Date: 3/26/2020  Narrative: PA AND LATERAL CHEST X-RAY  HISTORY: Esophageal adenocarcinoma. Hypertension and DVT.  TECHNIQUE: A total of 4 images of the chest are provided and are correlated with chest x-ray 03/24/2020 and chest CT angiogram performed the same day.  FINDINGS: There has been previous esophagectomy with gastric pull-up and there is a loculated right pleural effusion which appears unchanged. Opacity in the right mid and lower lung zone is no different than on the CT angiogram of the chest and is largely due to the postoperative change from the gastric pull-up and some fluid in the pulmonary fissures on the right, as was seen on the CT. No focal parenchymal infiltrate is identified. There is no pneumothorax. Cervical spine fusion hardware is present.      Impression: No change in the loculated right pleural effusion in this patient who is status post esophagectomy with gastric pull-up.  This report was  finalized on 3/26/2020 10:26 AM by Dr. Darian Howell M.D.      Xr Chest Pa & Lateral    Result Date: 3/24/2020  Narrative: XR CHEST PA AND LATERAL-  HISTORY: Male who is 70 years-old,  a history of pneumonia  TECHNIQUE: Frontal and lateral views of the chest  COMPARISON: 02/10/2020.  image from CT from 03/24/2020  FINDINGS: Heart size is borderline. Aorta is tortuous, calcified. Pulmonary vasculature is unremarkable. Persistent loculated right pleural effusion as well as infiltrate/atelectasis in the right lower lung, similar to prior exam. Small left basilar infiltrate/atelectasis. Continued follow-up suggested. No pneumothorax. Gastric pull-through changes are noted. No acute osseous process.      Impression: Persistent right pleural effusion, infiltrate/atelectasis in the right lower lung. Small left basilar infiltrate/atelectasis. Continued follow-up suggested. Borderline heart size. Tortuous aorta.  This report was finalized on 3/24/2020 5:17 PM by Dr. Sumanth Boyer M.D.         Medical Tests:        Summary of old records / correspondence / consultant report:   DC summary re: issues addressed on HPI    Request outside records:       ALLERGIES  No Known Allergies     MEDICATIONS   Current Outpatient Medications   Medication Sig Dispense Refill   • albuterol sulfate  (90 Base) MCG/ACT inhaler Inhale 1 puff Every 4 (Four) Hours As Needed for Wheezing.     • Apixaban (ELIQUIS DVT/PE STARTER PACK) tablet Take two 5 mg tablets by mouth every 12 hours for 7 days. Followed by one 5 mg tablet every 12 hours. (Dispense starter pack if available) 74 tablet 0   • ferrous sulfate 325 (65 FE) MG tablet Take 2 tablets by mouth Daily With Breakfast. 90 tablet 0   • furosemide (LASIX) 20 MG tablet Take 1 tablet by mouth Daily. 30 tablet 0   • metoprolol tartrate (LOPRESSOR) 25 MG tablet Take 0.5 tablets by mouth Every 12 (Twelve) Hours. 60 tablet 0   • pantoprazole (PROTONIX) 40 MG EC tablet Take 1 tablet by  mouth 2 (Two) Times a Day Before Meals. 60 tablet 0   • PARoxetine (PAXIL) 40 MG tablet Take 1 tablet by mouth Every Morning. 30 tablet 3   • potassium chloride (MICRO-K) 10 MEQ CR capsule      • pramipexole (MIRAPEX) 1.5 MG tablet Take 1 tablet by mouth 2 (Two) Times a Day. 180 tablet 1   • STIOLTO RESPIMAT 2.5-2.5 MCG/ACT aerosol solution inhaler Inhale 2 puffs Daily. (Patient taking differently: Inhale 2 puffs Daily. prn)     • tamsulosin (FLOMAX) 0.4 MG capsule 24 hr capsule Take 1 capsule by mouth Every Night.     • traZODone (DESYREL) 100 MG tablet Take 1 tablet by mouth Every Night for 180 days. 30 tablet 5     Current Facility-Administered Medications   Medication Dose Route Frequency Provider Last Rate Last Dose   • cyanocobalamin injection 1,000 mcg  1,000 mcg Intramuscular Q28 Days Brandin Bolton MD   1,000 mcg at 02/28/20 1559       Current outpatient and discharge medications have been reconciled for the patient.  Reviewed by: Brandin Bolton MD       [facility]:     The following portions of the patient's history were reviewed and updated as appropriate: Allergies / Current Medications / Past Medical History / Surgical History / Social History / Family History    PROBLEM LIST   Patient Active Problem List   Diagnosis   • Adenocarcinoma of esophagus (CMS/HCC)   • Adenomatous polyp of colon   • Malignant neoplasm of prostate (CMS/HCC)   • RLS (restless legs syndrome)   • Depression   • Hypertension   • Anemia   • Insomnia due to other mental disorder   • Peripheral polyneuropathy   • B12 deficiency   • Postsurgical malabsorption   • Shortness of breath   • Lymphedema   • Iron deficiency   • Gastroparesis   • Acute deep vein thrombosis (DVT) of popliteal vein of left lower extremity (CMS/HCC)       PAST MEDICAL HISTORY  Past Medical History:   Diagnosis Date   • Acute deep vein thrombosis (DVT) of popliteal vein of left lower extremity (CMS/HCC) 03/24/2020   • Anemia    • Anti-phospholipid syndrome (CMS/HCC)     On  lifelong anticoagulation therapy   • Bladder disorder     LEAKAGE   ON MED  wers pads   • Community acquired pneumonia     HISTORY OF IN 2014   • Deep venous thrombosis (CMS/HCC) 2006, 2008    Left lower extremity multiple   • Depression    • Esophageal carcinoma (CMS/HCC) 12/31/2014    had chemo and radiation prior surgery   • Hemorrhoids    • HH (hiatus hernia)    • History of atrial fibrillation 2015    ONE EPISODE WHILE HOSPITALIZED   • History of kidney stones    • History of nephrolithiasis    • History of pancreatitis     PT STATES MANY YEARS AGO   • History of radiation therapy    • Hypertension    • Long-term (current) use of anticoagulants, INR goal 2.0-3.0    • Lymphedema    • Malignant neoplasm of prostate (CMS/HCC)    • Other hyperlipidemia 1/30/2018 January 30, 2018 lipid panel risk 12.8%   • Restless legs syndrome    • Shortness of breath    • Squamous carcinoma     on the head       SURGICAL HISTORY  Past Surgical History:   Procedure Laterality Date   • APPENDECTOMY  1950   • BRONCHOSCOPY      (Diagnostic)   • CATARACT EXTRACTION Bilateral 2014   • COLONOSCOPY  12/15/2014    Complete / Description: EH, IH, torts, stool, follow-up colonoscopy due in 5 years.   • COLONOSCOPY N/A 6/13/2017    non-thrombosed external hemorrhoids, normal examined ileum, IH   • COLONOSCOPY N/A 2/26/2019    Procedure: COLONOSCOPY with Cold Polypectomy;  Surgeon: Mulugeta Millan MD;  Location: [facility] ENDOSCOPY;  Service: Gastroenterology   • CYSTOSCOPY W/ LASER LITHOTRIPSY     • ENDOSCOPY N/A 6/13/2017    Procedure: ESOPHAGOGASTRODUODENOSCOPY WITH COLD BIOPSY;  Surgeon: Lake Gonzalez MD;  Location: [facility] ENDOSCOPY;  Service:    • ESOPHAGECTOMY      April 2015, stage IIB esophageal carcinoma, sub-total resection.   • ESOPHAGECTOMY      Esophagectomy Subtotal Andrea Joe Procedure   • EXCISION LESION  08/2012    Removal of Squamous Cell CA on Head   • HAMMER TOE REPAIR  09/2014    Hammertoe Operation (Each Toe),  10/2014   • HAMMER TOE REPAIR Left 10/3/2017    Procedure: Left second third and fourth distal interphalangeal joint resection with flexor tenotomy;  Surgeon: Mulugeta Lira MD;  Location: [facility];  Service:    • HERNIA REPAIR      incisional   • JEJUNOSTOMY      Laparoscopic   • JEJUNOSTOMY      tube removal    • KNEE SURGERY Bilateral , ,    • PATELLA SURGERY Left     removed   • PILONIDAL CYST / SINUS EXCISION     • DC TOTAL KNEE ARTHROPLASTY Right 3/26/2018    Procedure: TOTAL KNEE ARTHROPLASTY;  Surgeon: Renny Solis MD;  Location: [facility] OR;  Service: Orthopedics   • PROSTATECTOMY     • PYLOROPLASTY     • SPINAL FUSION  1998    C 5,6   • TONSILLECTOMY     • TONSILLECTOMY     • UPPER GASTROINTESTINAL ENDOSCOPY  12/15/2014    LA Grade D esophagitis, Pardo's, HH, multiple duodenal ulcers   • VENTRAL/INCISIONAL HERNIA REPAIR N/A 2016    Procedure: VENTRAL/INCISIONAL HERNIA REPAIR, open, with mesh, and component separation;  Surgeon: Darren Rivas MD;  Location: [facility];  Service:        SOCIAL HISTORY  Social History     Socioeconomic History   • Marital status:      Spouse name: Lena   • Number of children: 0   • Years of education: College   • Highest education level: Not on file   Occupational History   • Occupation: Retired      Comment: Retired    Tobacco Use   • Smoking status: Former Smoker     Packs/day: 2.00     Years: 3.00     Pack years: 6.00     Types: Cigarettes     Last attempt to quit: 1974     Years since quittin.2   • Smokeless tobacco: Never Used   • Tobacco comment: no caffeine    Substance and Sexual Activity   • Alcohol use: Yes     Frequency: 2-3 times a week     Comment: 2-3 x per week   • Drug use: No   • Sexual activity: Defer   Social History Narrative    self-employed        FAMILY HISTORY  Family History   Problem Relation Age of Onset   • Cerebral aneurysm Mother          cerebral artery aneurysm ( age 56)   • Prostate cancer Brother 68   • Anxiety disorder Father    • Suicide Attempts Father          of suicide   • Cancer Father         bladder   • No Known Problems Brother    • Pancreatic cancer Nephew    • Colon cancer Neg Hx    • Esophageal cancer Neg Hx    • Dementia Neg Hx          **Dragon Disclaimer:   Much of this encounter note is an electronic transcription/translation of spoken language to printed text. The electronic translation of spoken language may permit erroneous, or at times, nonsensical words or phrases to be inadvertently transcribed. Although I have reviewed the note for such errors, some may still exist.       Template created by Moose Bolton MD

## 2020-03-31 NOTE — ASSESSMENT & PLAN NOTE
Managed by hematologist. Has follow-up with heme.   Watch for GI bleed with Eliquis (switched from warfarin due to DVT)    Lab Results   Component Value Date    HGB 8.3 (L) 03/27/2020    HGB 8.3 (L) 03/26/2020    HGB 8.8 (L) 03/25/2020

## 2020-04-01 ENCOUNTER — READMISSION MANAGEMENT (OUTPATIENT)
Dept: CALL CENTER | Facility: HOSPITAL | Age: 70
End: 2020-04-01

## 2020-04-03 ENCOUNTER — READMISSION MANAGEMENT (OUTPATIENT)
Dept: CALL CENTER | Facility: HOSPITAL | Age: 70
End: 2020-04-03

## 2020-04-03 ENCOUNTER — APPOINTMENT (OUTPATIENT)
Dept: OTHER | Facility: HOSPITAL | Age: 70
End: 2020-04-03

## 2020-04-03 NOTE — OUTREACH NOTE
Medical Week 1 Survey      Responses   [facility] patient discharged from?  [place]   Does the patient have one of the following disease processes/diagnoses(primary or secondary)?  Other   Is there a successful TCM telephone encounter documented?  No   Week 1 attempt successful?  No   Unsuccessful attempts  Attempt 2          Bernie Jaquez RN

## 2020-04-06 DIAGNOSIS — G25.81 RLS (RESTLESS LEGS SYNDROME): Chronic | ICD-10-CM

## 2020-04-06 RX ORDER — PRAMIPEXOLE DIHYDROCHLORIDE 1.5 MG/1
1.5 TABLET ORAL 2 TIMES DAILY
Qty: 180 TABLET | Refills: 1 | Status: SHIPPED | OUTPATIENT
Start: 2020-04-06 | End: 2020-10-13

## 2020-04-07 ENCOUNTER — READMISSION MANAGEMENT (OUTPATIENT)
Dept: CALL CENTER | Facility: HOSPITAL | Age: 70
End: 2020-04-07

## 2020-04-07 ENCOUNTER — ANTICOAGULATION VISIT (OUTPATIENT)
Dept: INTERNAL MEDICINE | Age: 70
End: 2020-04-07

## 2020-04-07 NOTE — OUTREACH NOTE
Medical Week 1 Survey      Responses   [facility] patient discharged from?  [place]   Does the patient have one of the following disease processes/diagnoses(primary or secondary)?  Other   Is there a successful TCM telephone encounter documented?  No   Week 1 attempt successful?  No   Unsuccessful attempts  Attempt 3          Marge Reveles RN

## 2020-04-13 ENCOUNTER — READMISSION MANAGEMENT (OUTPATIENT)
Dept: CALL CENTER | Facility: HOSPITAL | Age: 70
End: 2020-04-13

## 2020-04-13 NOTE — OUTREACH NOTE
Medical Week 2 Survey      Responses   [facility] patient discharged from?  [place]   COVID-19 Test Status  Not tested   Does the patient have one of the following disease processes/diagnoses(primary or secondary)?  Other   Week 2 attempt successful?  No   Unsuccessful attempts  Attempt 1          Marianna Mcelroy RN

## 2020-04-14 ENCOUNTER — READMISSION MANAGEMENT (OUTPATIENT)
Dept: CALL CENTER | Facility: HOSPITAL | Age: 70
End: 2020-04-14

## 2020-04-14 NOTE — OUTREACH NOTE
Medical Week 2 Survey      Responses   [facility] patient discharged from?  [place]   COVID-19 Test Status  Not tested   Does the patient have one of the following disease processes/diagnoses(primary or secondary)?  Other   Week 2 attempt successful?  No   Unsuccessful attempts  Attempt 2          Melly Manriquez RN

## 2020-04-21 ENCOUNTER — TELEPHONE (OUTPATIENT)
Dept: INTERNAL MEDICINE | Age: 70
End: 2020-04-21

## 2020-04-21 ENCOUNTER — READMISSION MANAGEMENT (OUTPATIENT)
Dept: CALL CENTER | Facility: HOSPITAL | Age: 70
End: 2020-04-21

## 2020-04-21 NOTE — OUTREACH NOTE
Medical Week 3 Survey      Responses   [facility] patient discharged from?  [place]   COVID-19 Test Status  Not tested   Does the patient have one of the following disease processes/diagnoses(primary or secondary)?  Other   Week 3 attempt successful?  No   Unsuccessful attempts  Attempt 1          Elvira Velez RN

## 2020-04-21 NOTE — TELEPHONE ENCOUNTER
PT CALLED STATING HE IS HAVING JEFF SHAKES IN HIS HANDS AND BELIEVES IT TO BE CAUSED BY ONE OF HIS MEDICATIONS     PLEASE ADVISE     PT CALL BACK   [phone]

## 2020-04-22 ENCOUNTER — OFFICE VISIT (OUTPATIENT)
Dept: INTERNAL MEDICINE | Age: 70
End: 2020-04-22

## 2020-04-22 DIAGNOSIS — I10 ESSENTIAL HYPERTENSION: Chronic | ICD-10-CM

## 2020-04-22 DIAGNOSIS — R25.1 TREMOR OF BOTH HANDS: Primary | ICD-10-CM

## 2020-04-22 PROCEDURE — 99441 PR PHYS/QHP TELEPHONE EVALUATION 5-10 MIN: CPT | Performed by: INTERNAL MEDICINE

## 2020-04-22 NOTE — ASSESSMENT & PLAN NOTE
Ran out of metoprolol tartrate 3 days ago. Resume medication.   Taking metoprolol tartrate 25 mg 1 tablet BID.

## 2020-04-22 NOTE — PROGRESS NOTES
[facility] INTERNAL MEDICINE  RICHARDSON BOLTON M.D.      Jose FITCH Hurtado / 70 y.o. / male  04/22/2020      CC:  Main reason(s) for today's visit: TELEPHONE ENCOUNTER: Tremors      HPI:     THIS WAS A TELEPHONE ENCOUNTER, NECESSITATED BY CURRENT COVID-19 CRISIS.    You have chosen to receive care through a telephone visit. Do you consent to use a telephone visit for your medical care today? Yes       Mild tremors of bilateral hands x 2 days. Ran out of metoprolol for hypertension 3 days ago. No other neuro changes.       Patient Care Team:  Brandin Bolton MD as PCP - General (Internal Medicine)  Tapan, George THORPE II, MD as Consulting Physician (Hematology and Oncology)  Jose Inman MD as Consulting Physician (Urology)  Mulugeta Millan MD as Consulting Physician (Gastroenterology)  Seth Randhawa MD as Consulting Physician (Ophthalmology)    ASSESSMENT & PLAN:    1. Tremor of both hands    2. Essential hypertension      No orders of the defined types were placed in this encounter.    New Medications Ordered This Visit   Medications   • metoprolol tartrate (LOPRESSOR) 25 MG tablet     Sig: Take 1 tablet by mouth 2 (Two) Times a Day.     Dispense:  180 tablet     Refill:  3        Summary/Discussion:  • Probably related to sudden stoppage of metoprolol. Resume medication today. Prescription sent to pharmacy.       Time spent on phone: 7 minutes    Next Appointment with me: 4/22/2020    Return for worsening or lack of improvement, Next scheduled follow up.    ____________________________________________________________________    MEDICATIONS  Current Outpatient Medications   Medication Sig Dispense Refill   • albuterol sulfate  (90 Base) MCG/ACT inhaler Inhale 1 puff Every 4 (Four) Hours As Needed for Wheezing.     • Apixaban (ELIQUIS DVT/PE STARTER PACK) tablet Take two 5 mg tablets by mouth every 12 hours for 7 days. Followed by one 5 mg tablet every 12 hours. (Dispense starter pack if available) 74 tablet 0   •  ferrous sulfate 325 (65 FE) MG tablet Take 2 tablets by mouth Daily With Breakfast. 90 tablet 0   • furosemide (LASIX) 20 MG tablet Take 1 tablet by mouth Daily. 30 tablet 0   • metoprolol tartrate (LOPRESSOR) 25 MG tablet Take 1 tablet by mouth 2 (Two) Times a Day. 180 tablet 3   • pantoprazole (PROTONIX) 40 MG EC tablet Take 1 tablet by mouth 2 (Two) Times a Day Before Meals. 60 tablet 0   • PARoxetine (PAXIL) 40 MG tablet Take 1 tablet by mouth Every Morning. 30 tablet 3   • potassium chloride (MICRO-K) 10 MEQ CR capsule      • pramipexole (Mirapex) 1.5 MG tablet Take 1 tablet by mouth 2 (Two) Times a Day. 180 tablet 1   • STIOLTO RESPIMAT 2.5-2.5 MCG/ACT aerosol solution inhaler Inhale 2 puffs Daily. (Patient taking differently: Inhale 2 puffs Daily. prn)     • tamsulosin (FLOMAX) 0.4 MG capsule 24 hr capsule Take 1 capsule by mouth Every Night.     • traZODone (DESYREL) 100 MG tablet Take 1 tablet by mouth Every Night for 180 days. 30 tablet 5     Current Facility-Administered Medications   Medication Dose Route Frequency Provider Last Rate Last Dose   • cyanocobalamin injection 1,000 mcg  1,000 mcg Intramuscular Q28 Days Brandin Bolton MD   1,000 mcg at 03/31/20 1300          ____________________________________________________________________      REVIEW OF SYSTEMS    Review of Systems  Tremors of hands     PHYSICAL EXAMINATION  There were no vitals taken for this visit.   Alert with normal thought and judgment.       REVIEWED DATA:    Labs:         Imaging:         Medical Tests:         Summary of old records / correspondence / consultant report:          Request outside records:         ALLERGIES  No Known Allergies     PFSH:     The following portions of the patient's history were reviewed and updated as appropriate: Allergies / Current Medications / Past Medical History / Surgical History / Social History / Family History    PROBLEM LIST   Patient Active Problem List   Diagnosis   • Adenocarcinoma of  esophagus (CMS/HCC)   • Adenomatous polyp of colon   • Malignant neoplasm of prostate (CMS/HCC)   • RLS (restless legs syndrome)   • Depression   • Hypertension   • Anemia   • Insomnia due to other mental disorder   • Peripheral polyneuropathy   • B12 deficiency   • Postsurgical malabsorption   • Shortness of breath   • Lymphedema   • Iron deficiency   • Gastroparesis   • Acute deep vein thrombosis (DVT) of popliteal vein of left lower extremity (CMS/HCC)       PAST MEDICAL HISTORY  Past Medical History:   Diagnosis Date   • Acute deep vein thrombosis (DVT) of popliteal vein of left lower extremity (CMS/HCC) 03/24/2020   • Anemia    • Anti-phospholipid syndrome (CMS/HCC)     On lifelong anticoagulation therapy   • Bladder disorder     LEAKAGE   ON MED  wers pads   • Community acquired pneumonia     HISTORY OF IN 2014   • Deep venous thrombosis (CMS/HCC) 2006, 2008    Left lower extremity multiple   • Depression    • Esophageal carcinoma (CMS/HCC) 12/31/2014    had chemo and radiation prior surgery   • Hemorrhoids    • HH (hiatus hernia)    • History of atrial fibrillation 2015    ONE EPISODE WHILE HOSPITALIZED   • History of kidney stones    • History of nephrolithiasis    • History of pancreatitis     PT STATES MANY YEARS AGO   • History of radiation therapy    • Hypertension    • Long-term (current) use of anticoagulants, INR goal 2.0-3.0    • Lymphedema    • Malignant neoplasm of prostate (CMS/HCC)    • Other hyperlipidemia 1/30/2018 January 30, 2018 lipid panel risk 12.8%   • Restless legs syndrome    • Shortness of breath    • Squamous carcinoma     on the head       SURGICAL HISTORY  Past Surgical History:   Procedure Laterality Date   • APPENDECTOMY  1950   • BRONCHOSCOPY      (Diagnostic)   • CATARACT EXTRACTION Bilateral 2014   • COLONOSCOPY  12/15/2014    Complete / Description: EH, IH, torts, stool, follow-up colonoscopy due in 5 years.   • COLONOSCOPY N/A 6/13/2017    non-thrombosed external  hemorrhoids, normal examined ileum, IH   • COLONOSCOPY N/A 2/26/2019    Procedure: COLONOSCOPY with Cold Polypectomy;  Surgeon: Mulugeta Millan MD;  Location: [facility] ENDOSCOPY;  Service: Gastroenterology   • CYSTOSCOPY W/ LASER LITHOTRIPSY     • ENDOSCOPY N/A 6/13/2017    Procedure: ESOPHAGOGASTRODUODENOSCOPY WITH COLD BIOPSY;  Surgeon: Lake Gonzalez MD;  [facility];  Service:    • ESOPHAGECTOMY      April 2015, stage IIB esophageal carcinoma, sub-total resection.   • ESOPHAGECTOMY      Esophagectomy Subtotal Andrea Joe Procedure   • EXCISION LESION  08/2012    Removal of Squamous Cell CA on Head   • HAMMER TOE REPAIR  09/2014    Hammertoe Operation (Each Toe), 10/2014   • HAMMER TOE REPAIR Left 10/3/2017    Procedure: Left second third and fourth distal interphalangeal joint resection with flexor tenotomy;  Surgeon: Mulugeta Lira MD;  Location: [facility] OR OSC;  Service:    • HERNIA REPAIR      incisional   • JEJUNOSTOMY      Laparoscopic   • JEJUNOSTOMY      tube removal    • KNEE SURGERY Bilateral 1967, 1973, 1981   • PATELLA SURGERY Left     removed   • PILONIDAL CYST / SINUS EXCISION     • OH TOTAL KNEE ARTHROPLASTY Right 3/26/2018    Procedure: TOTAL KNEE ARTHROPLASTY;  Surgeon: Renny Solis MD;  Location: [facility] MAIN OR;  Service: Orthopedics   • PROSTATECTOMY  2010   • PYLOROPLASTY     • SPINAL FUSION  02/1998    C 5,6   • TONSILLECTOMY  1955   • TONSILLECTOMY     • UPPER GASTROINTESTINAL ENDOSCOPY  12/15/2014    LA Grade D esophagitis, Pardo's, HH, multiple duodenal ulcers   • VENTRAL/INCISIONAL HERNIA REPAIR N/A 4/14/2016    Procedure: VENTRAL/INCISIONAL HERNIA REPAIR, open, with mesh, and component separation;  Surgeon: Darren Rivas MD;  Location: [facility] MAIN OR;  Service:        SOCIAL HISTORY  Social History     Socioeconomic History   • Marital status:      Spouse name: Lena   • Number of children: 0   • Years of education: College   • Highest  education level: Not on file   Occupational History   • Occupation: Retired      Comment: Retired 2014   Tobacco Use   • Smoking status: Former Smoker     Packs/day: 2.00     Years: 3.00     Pack years: 6.00     Types: Cigarettes     Last attempt to quit: 1974     Years since quittin.3   • Smokeless tobacco: Never Used   • Tobacco comment: no caffeine    Substance and Sexual Activity   • Alcohol use: Yes     Frequency: 2-3 times a week     Comment: 2-3 x per week   • Drug use: No   • Sexual activity: Defer   Social History Narrative    self-employed            **Dragon Disclaimer:   Much of this encounter note is an electronic transcription/translation of spoken language to printed text. The electronic translation of spoken language may permit erroneous, or at times, nonsensical words or phrases to be inadvertently transcribed. Although I have reviewed the note for such errors, some may still exist.     Template created by Moose Bolton MD

## 2020-04-23 ENCOUNTER — READMISSION MANAGEMENT (OUTPATIENT)
Dept: CALL CENTER | Facility: HOSPITAL | Age: 70
End: 2020-04-23

## 2020-04-23 NOTE — OUTREACH NOTE
Medical Week 3 Survey      Responses   [facility]   COVID-19 Test Status  Not tested   Does the patient have one of the following disease processes/diagnoses(primary or secondary)?  Other   Week 3 attempt successful?  Yes   Call start time  1143   Call end time  1147   Discharge diagnosis  Acute deep vein thrombosis (DVT) of popliteal vein of left lower extremity   Is patient permission given to speak with other caregiver?  No   Meds reviewed with patient/caregiver?  Yes   Is the patient taking all medications as directed (includes completed medication regime)?  Yes   Does the patient have a primary care provider?   Yes   Comments regarding PCP  Brandin Bolton MD PCP. Patient states that he has had contact with Dr by phone.    Has the patient kept scheduled appointments due by today?  Yes [Phone appts. ]   Has home health visited the patient within 72 hours of discharge?  N/A   Psychosocial issues?  No   Did the patient receive a copy of their discharge instructions?  Yes   Nursing interventions  Reviewed instructions with patient   What is the patient's perception of their health status since discharge?  Improving   Is the patient/caregiver able to teach back signs and symptoms related to disease process for when to call PCP?  Yes   Is the patient/caregiver able to teach back signs and symptoms related to disease process for when to call 911?  Yes   Is the patient/caregiver able to teach back the hierarchy of who to call/visit for symptoms/problems? PCP, Specialist, Home health nurse, Urgent Care, ED, 911  Yes   Week 3 Call Completed?  Yes   Revoked  No further contact(revokes)-requires comment   Graduated/Revoked comments  Patient denies need for further follow up calls. He reports that he will contact his Dr's with any needs.           Melly Rose RN

## 2020-04-30 ENCOUNTER — CLINICAL SUPPORT (OUTPATIENT)
Dept: INTERNAL MEDICINE | Age: 70
End: 2020-04-30

## 2020-04-30 DIAGNOSIS — E53.8 B12 DEFICIENCY: Chronic | ICD-10-CM

## 2020-04-30 PROCEDURE — 96372 THER/PROPH/DIAG INJ SC/IM: CPT | Performed by: INTERNAL MEDICINE

## 2020-04-30 RX ADMIN — CYANOCOBALAMIN 1000 MCG: 1000 INJECTION, SOLUTION INTRAMUSCULAR; SUBCUTANEOUS at 10:26

## 2020-05-01 ENCOUNTER — HOSPITAL ENCOUNTER (OUTPATIENT)
Dept: CT IMAGING | Facility: HOSPITAL | Age: 70
Discharge: HOME | End: 2020-05-01
Admitting: INTERNAL MEDICINE

## 2020-05-01 DIAGNOSIS — C15.9 ADENOCARCINOMA OF ESOPHAGUS (HCC): ICD-10-CM

## 2020-05-01 DIAGNOSIS — R59.1 LYMPHADENOPATHY: ICD-10-CM

## 2020-05-01 LAB — CREAT BLDA-MCNC: 1.2 MG/DL (ref 0.6–1.3)

## 2020-05-01 PROCEDURE — 71260 CT THORAX DX C+: CPT

## 2020-05-01 PROCEDURE — 25010000002 IOPAMIDOL 61 % SOLUTION: Performed by: INTERNAL MEDICINE

## 2020-05-01 PROCEDURE — 82565 ASSAY OF CREATININE: CPT

## 2020-05-01 RX ORDER — FUROSEMIDE 20 MG/1
TABLET ORAL
Qty: 30 TABLET | Refills: 11 | Status: ON HOLD | OUTPATIENT
Start: 2020-05-01 | End: 2021-05-13 | Stop reason: SDUPTHER

## 2020-05-01 RX ADMIN — IOPAMIDOL 75 ML: 612 INJECTION, SOLUTION INTRAVENOUS at 09:54

## 2020-05-01 NOTE — TELEPHONE ENCOUNTER
PT IS REQUESTING A RX FOR Apixaban (ELIQUIS DVT/PE STARTER PACK) tablet    [facility].

## 2020-05-04 DIAGNOSIS — R59.1 LYMPHADENOPATHY: Primary | ICD-10-CM

## 2020-05-04 DIAGNOSIS — C15.9 ADENOCARCINOMA OF ESOPHAGUS (HCC): ICD-10-CM

## 2020-05-04 DIAGNOSIS — D50.9 IRON DEFICIENCY ANEMIA, UNSPECIFIED IRON DEFICIENCY ANEMIA TYPE: ICD-10-CM

## 2020-05-04 RX ORDER — POTASSIUM CHLORIDE 750 MG/1
10 CAPSULE, EXTENDED RELEASE ORAL DAILY
Qty: 30 CAPSULE | Refills: 3 | Status: ON HOLD | OUTPATIENT
Start: 2020-05-04 | End: 2021-05-13 | Stop reason: SDUPTHER

## 2020-05-04 NOTE — PROGRESS NOTES
Subjective .     REASONS FOR FOLLOWUP:  Esophageal cancer, cytopenias     HISTORY OF PRESENT ILLNESS:  The patient is a 70 y.o. year old male  who is here for follow-up with the above-mentioned history.    He is here today for hospital follow-up.  He was found to have a new DVT despite Coumadin.  He was changed to Eliquis.    States he is doing fine on Eliquis.  No bleeding.  Denies chest pain or shortness of breath.    Past Medical History:   Diagnosis Date   • Acute deep vein thrombosis (DVT) of popliteal vein of left lower extremity (CMS/HCC) 03/24/2020   • Anemia    • Anti-phospholipid syndrome (CMS/HCC)     On lifelong anticoagulation therapy   • Bladder disorder     LEAKAGE   ON MED  wers pads   • Community acquired pneumonia     HISTORY OF IN 2014   • Deep venous thrombosis (CMS/HCC) 2006, 2008    Left lower extremity multiple   • Depression    • Esophageal carcinoma (CMS/HCC) 12/31/2014    had chemo and radiation prior surgery   • Hemorrhoids    • HH (hiatus hernia)    • History of atrial fibrillation 2015    ONE EPISODE WHILE HOSPITALIZED   • History of kidney stones    • History of nephrolithiasis    • History of pancreatitis     PT STATES MANY YEARS AGO   • History of radiation therapy    • Hypertension    • Long-term (current) use of anticoagulants, INR goal 2.0-3.0    • Lymphedema    • Malignant neoplasm of prostate (CMS/HCC)    • Other hyperlipidemia 1/30/2018 January 30, 2018 lipid panel risk 12.8%   • Restless legs syndrome    • Shortness of breath    • Squamous carcinoma     on the head     Past Surgical History:   Procedure Laterality Date   • APPENDECTOMY  1950   • BRONCHOSCOPY      (Diagnostic)   • CATARACT EXTRACTION Bilateral 2014   • COLONOSCOPY  12/15/2014    Complete / Description: EH, IH, torts, stool, follow-up colonoscopy due in 5 years.   • COLONOSCOPY N/A 6/13/2017    non-thrombosed external hemorrhoids, normal examined ileum, IH   • COLONOSCOPY N/A 2/26/2019    Procedure:  COLONOSCOPY with Cold Polypectomy;  Surgeon: Mulugeta Millan MD;  Location: [facility] ENDOSCOPY;  Service: Gastroenterology   • CYSTOSCOPY W/ LASER LITHOTRIPSY     • ENDOSCOPY N/A 6/13/2017    Procedure: ESOPHAGOGASTRODUODENOSCOPY WITH COLD BIOPSY;  Surgeon: Lake Gonzalez MD;  Location: [facility] ENDOSCOPY;  Service:    • ESOPHAGECTOMY      April 2015, stage IIB esophageal carcinoma, sub-total resection.   • ESOPHAGECTOMY      Esophagectomy Subtotal Nunez Joe Procedure   • EXCISION LESION  08/2012    Removal of Squamous Cell CA on Head   • HAMMER TOE REPAIR  09/2014    Hammertoe Operation (Each Toe), 10/2014   • HAMMER TOE REPAIR Left 10/3/2017    Procedure: Left second third and fourth distal interphalangeal joint resection with flexor tenotomy;  Surgeon: Mulugeta Lira MD;  Location: [facility] OR OSC;  Service:    • HERNIA REPAIR      incisional   • JEJUNOSTOMY      Laparoscopic   • JEJUNOSTOMY      tube removal    • KNEE SURGERY Bilateral 1967, 1973, 1981   • PATELLA SURGERY Left     removed   • PILONIDAL CYST / SINUS EXCISION     • MD TOTAL KNEE ARTHROPLASTY Right 3/26/2018    Procedure: TOTAL KNEE ARTHROPLASTY;  Surgeon: Renny Solis MD;  Location: [facility] MAIN OR;  Service: Orthopedics   • PROSTATECTOMY  2010   • PYLOROPLASTY     • SPINAL FUSION  02/1998    C 5,6   • TONSILLECTOMY  1955   • TONSILLECTOMY     • UPPER GASTROINTESTINAL ENDOSCOPY  12/15/2014    LA Grade D esophagitis, Pardo's, HH, multiple duodenal ulcers   • VENTRAL/INCISIONAL HERNIA REPAIR N/A 4/14/2016    Procedure: VENTRAL/INCISIONAL HERNIA REPAIR, open, with mesh, and component separation;  Surgeon: Darren Rivas MD;  Location: [facility] MAIN OR;  Service:        HEMATOLOGIC/ONCOLOGIC HISTORY:  (History from previous dates can be found in the separate document.)    MEDICATIONS    Current Outpatient Medications:   •  albuterol sulfate  (90 Base) MCG/ACT inhaler, Inhale 1 puff Every 4 (Four) Hours As Needed for  Wheezing., Disp: , Rfl:   •  Apixaban (ELIQUIS DVT/PE STARTER PACK) tablet, Take two 5 mg tablets by mouth every 12 hours for 7 days. Followed by one 5 mg tablet every 12 hours. (Dispense starter pack if available), Disp: 74 tablet, Rfl: 0  •  apixaban (ELIQUIS) 5 MG tablet tablet, Take 1 tablet by mouth Every 12 (Twelve) Hours., Disp: 60 tablet, Rfl: 3  •  ferrous sulfate 325 (65 FE) MG tablet, Take 2 tablets by mouth Daily With Breakfast., Disp: 90 tablet, Rfl: 0  •  furosemide (LASIX) 20 MG tablet, TAKE ONE TABLET BY MOUTH DAILY, Disp: 30 tablet, Rfl: 11  •  metoprolol tartrate (LOPRESSOR) 25 MG tablet, Take 1 tablet by mouth 2 (Two) Times a Day., Disp: 180 tablet, Rfl: 3  •  pantoprazole (PROTONIX) 40 MG EC tablet, Take 1 tablet by mouth 2 (Two) Times a Day Before Meals., Disp: 60 tablet, Rfl: 0  •  PARoxetine (PAXIL) 40 MG tablet, Take 1 tablet by mouth Every Morning., Disp: 30 tablet, Rfl: 3  •  potassium chloride (MICRO-K) 10 MEQ CR capsule, Take 1 capsule by mouth Daily., Disp: 30 capsule, Rfl: 3  •  pramipexole (Mirapex) 1.5 MG tablet, Take 1 tablet by mouth 2 (Two) Times a Day., Disp: 180 tablet, Rfl: 1  •  STIOLTO RESPIMAT 2.5-2.5 MCG/ACT aerosol solution inhaler, Inhale 2 puffs Daily. (Patient taking differently: Inhale 2 puffs Daily. prn), Disp: , Rfl:   •  tamsulosin (FLOMAX) 0.4 MG capsule 24 hr capsule, Take 1 capsule by mouth Every Night., Disp: , Rfl:   •  traZODone (DESYREL) 100 MG tablet, Take 1 tablet by mouth Every Night for 180 days., Disp: 30 tablet, Rfl: 5    Current Facility-Administered Medications:   •  cyanocobalamin injection 1,000 mcg, 1,000 mcg, Intramuscular, Q28 Days, Brandin Bolton MD, 1,000 mcg at 04/30/20 1026    ALLERGIES:   No Known Allergies    SOCIAL HISTORY:       Social History     Socioeconomic History   • Marital status:      Spouse name: Lena   • Number of children: 0   • Years of education: College   • Highest education level: Not on file   Occupational History    • Occupation: Retired      Comment: Retired    Tobacco Use   • Smoking status: Former Smoker     Packs/day: 2.00     Years: 3.00     Pack years: 6.00     Types: Cigarettes     Last attempt to quit: 1974     Years since quittin.3   • Smokeless tobacco: Never Used   • Tobacco comment: no caffeine    Substance and Sexual Activity   • Alcohol use: Yes     Frequency: 2-3 times a week     Comment: 2-3 x per week   • Drug use: No   • Sexual activity: Defer   Social History Narrative    self-employed          FAMILY HISTORY:  Family History   Problem Relation Age of Onset   • Cerebral aneurysm Mother         cerebral artery aneurysm ( age 56)   • Prostate cancer Brother 68   • Anxiety disorder Father    • Suicide Attempts Father          of suicide   • Cancer Father         bladder   • No Known Problems Brother    • Pancreatic cancer Nephew    • Colon cancer Neg Hx    • Esophageal cancer Neg Hx    • Dementia Neg Hx        REVIEW OF SYSTEMS:    Review of Systems   Constitutional: Negative for activity change.   HENT: Negative for nosebleeds and trouble swallowing.    Respiratory: Negative for shortness of breath and wheezing.    Cardiovascular: Negative for chest pain and palpitations.   Gastrointestinal: Negative for constipation and diarrhea.   Genitourinary: Negative for dysuria and hematuria.   Musculoskeletal: Positive for back pain. Negative for arthralgias and myalgias.   Neurological: Negative for seizures and syncope.   Hematological: Negative for adenopathy. Does not bruise/bleed easily.   Psychiatric/Behavioral: Negative for confusion.           Objective    There were no vitals filed for this visit.  Current Status 2020   ECOG score 0      PHYSICAL EXAM:    No physical exam today as this was a telephone visit.    RECENT LABS:        WBC   Date/Time Value Ref Range Status   2020 02:26 PM 3.99 3.40 - 10.80 10*3/mm3 Final   04/15/2019 12:31 PM 3.53 3.40 -  10.80 10*3/mm3 Final   04/16/2018 04:25 PM 4.23 (L) 4.5 - 11.0 10*3/uL Final     Hemoglobin   Date/Time Value Ref Range Status   05/05/2020 02:26 PM 9.9 (L) 13.0 - 17.7 g/dL Final   04/16/2018 04:25 PM 9.9 (L) 13.5 - 17.5 g/dL Final     Platelets   Date/Time Value Ref Range Status   05/05/2020 02:26  140 - 450 10*3/mm3 Final   04/16/2018 04:25  140 - 440 10*3/uL Final       Assessment/Plan     ASSESSMENT:  Adenocarcinoma of esophagus (CMS/HCC)  - Ferritin  - Iron Profile  - Retic With IRF & RET-He  - CBC & Differential      *Esophageal adenocarcinoma. Initially T2N0M0. After neoadjuvant carboplatin/Taxol with radiation, achieved a pathologic CR at resection, 4/24/2015.   · As per NCCN guidelines, plan CAT scans every 6 months x1 year, then every 6 to 9 months on years 2 and years 3. Defer any surveillance EGDs to Dr. Gonzalez if he feels they are appropriate.   · Also as per NCCN guidelines, plan H and P every 3 to 6 months on years 1 and years 2, then every 6 to 12 months' time on years 3 through years 5 and then annually.   · EGD 6/13/17 by Dr. Gonzalez: No evidence of recurrence.  · CT scan 3/2/18 (this completed 3 years of surveillance CT): No evidence of recurrence.  Plan no more surveillance CT.  No signs of recurrence.  Remains in remission.    *Recurrent DVT, prior to cancer diagnosis.    · Dr. Bolton manages his Coumadin.   · Chronic left leg larger than right, since DVT  · Acute left popliteal, gastrocnemius DVT on 3/24/2020 despite INR 3.4.  Therefore, changed to Eliquis.  · On 3/25/2020, unremarkable: Lupus anticoagulant, beta-2 glycoprotein antibodies.  Anticardiolipin antibodies also felt to be unremarkable with IgG and IgM both at 15 (negative is <15.  Indeterminate is 15-20).    *CT angiogram chest 3/24/2020 (LLE acute DVT found 3/24/2020): Mild increased opacity LLL may represent developing infiltrate versus atelectasis.  Radiologist recommended follow-up.  · CT chest 5/1/2020: Resolution of  groundglass LLL infiltrate from the 3/24/2020 CT.  A few mildly enlarged mediastinal nodes are less prominent than on the 3/18/2020 CT.  No new abnormalities.  · CT images personally viewed by me.  Plan normal follow-up CT scans.    *Leukocytopenia, anemia, thrombocytopenia.   ·  He had a white blood cell count in the upper 3s and a hemoglobin in the upper 12s with a normal platelet count prior to beginning chemotherapy. We will monitor this.   WBC 4    *Persistent cough.  He has seen Dr. Maher Sayied for this.    He did not complain of this today.    *Previously complained of emesis occurring 5 hours after eating on average once every month or 2.  No nausea otherwise.  Denies dysphagia or odynophagia.    Unchanged.  Occurring on average once every couple of months.  He did not complain of this today    *Iron deficiency anemia  · Hb mostly around 11  · On 4/15/2019, ferritin 29.7, 13% saturation.  Serum folate normal.  · On oral iron daily through PCP.  · On 8/23/19, ferritin 65, 14%.   · Increased oral iron.   · On 10/15/19, ferritin 72, 10%.  · On 10/25/2019, stopped oral iron since it was not helping.    · 2 doses Injectafer.  · On 12/13/2019, ferritin 708, 15% saturation, Hb 10.4.  · Therefore, no IV iron.  Monitor.  · On 5/5/2020, ferritin 346, 15% saturation, Hb 9.9.  Therefore, no need for IV iron at this time.    *Source of iron deficiency.  · Last colonoscopy 2/26/2019 by Dr. Millan.  Last EGD 6/13/2017 by Dr. Gonzalez.  · Suspect he has an absorption issue due to previous esophageal surgery.    *B12 deficiency.  · On 6/6/2019, B12 <150  · On B12 injections through PCP.  B12 on 5/5/2020 normal at 694    *Anemia for reasons in addition to iron deficiency and B12 deficiency  · On 5/5/2020, unremarkable: RBC folate, iron labs, B12, haptoglobin, TB, LDH, direct Maki.  · Creatinine baseline is 0.9-1.2   · Hb is 9.9    PLAN:  MD 2 months.  CBC and iron labs 1 week prior  I reminded him him if Hb drops and  remains around 9 or so, we may want to plan a bone marrow biopsy  He agrees with observation for now since Hb up to 9.9.    Previous plan was:  · M.D. 6 months.  1 week prior: Iron labs.  · No more surveillance CT scans.  · He does not have a port.    You have chosen to receive care through a telephone visit today. Do you consent to use a telephone visit for your medical care today? Yes     This visit has been rescheduled as a phone visit to comply with patient safety concerns in accordance with CDC recommendations. Total time of discussion was 11 minutes.    59495 is 5-10 minutes  74400 is 11-20 minutes  11751 is 21 or more minutes

## 2020-05-05 ENCOUNTER — LAB (OUTPATIENT)
Dept: OTHER | Facility: HOSPITAL | Age: 70
End: 2020-05-05

## 2020-05-05 ENCOUNTER — TELEPHONE (OUTPATIENT)
Dept: ONCOLOGY | Facility: CLINIC | Age: 70
End: 2020-05-05

## 2020-05-05 DIAGNOSIS — C15.9 ADENOCARCINOMA OF ESOPHAGUS (HCC): ICD-10-CM

## 2020-05-05 DIAGNOSIS — D50.9 IRON DEFICIENCY ANEMIA, UNSPECIFIED IRON DEFICIENCY ANEMIA TYPE: ICD-10-CM

## 2020-05-05 DIAGNOSIS — R59.1 LYMPHADENOPATHY: ICD-10-CM

## 2020-05-05 LAB
ALBUMIN SERPL-MCNC: 3.8 G/DL (ref 3.5–5.2)
ALBUMIN/GLOB SERPL: 1 G/DL
ALP SERPL-CCNC: 149 U/L (ref 39–117)
ALT SERPL W P-5'-P-CCNC: 15 U/L (ref 1–41)
ANION GAP SERPL CALCULATED.3IONS-SCNC: 11.3 MMOL/L (ref 5–15)
AST SERPL-CCNC: 24 U/L (ref 1–40)
BASOPHILS # BLD AUTO: 0.02 10*3/MM3 (ref 0–0.2)
BASOPHILS NFR BLD AUTO: 0.5 % (ref 0–1.5)
BILIRUB SERPL-MCNC: 0.2 MG/DL (ref 0.1–1.2)
BUN BLD-MCNC: 20 MG/DL (ref 8–23)
BUN/CREAT SERPL: 16.4 (ref 7–25)
CALCIUM SPEC-SCNC: 9.2 MG/DL (ref 8.6–10.5)
CHLORIDE SERPL-SCNC: 105 MMOL/L (ref 98–107)
CO2 SERPL-SCNC: 26.7 MMOL/L (ref 22–29)
CREAT BLD-MCNC: 1.22 MG/DL (ref 0.76–1.27)
DAT POLY-SP REAG RBC QL: NEGATIVE
DEPRECATED RDW RBC AUTO: 55.2 FL (ref 37–54)
EOSINOPHIL # BLD AUTO: 0.44 10*3/MM3 (ref 0–0.4)
EOSINOPHIL NFR BLD AUTO: 11 % (ref 0.3–6.2)
ERYTHROCYTE [DISTWIDTH] IN BLOOD BY AUTOMATED COUNT: 15.9 % (ref 12.3–15.4)
FERRITIN SERPL-MCNC: 346 NG/ML (ref 30–400)
GFR SERPL CREATININE-BSD FRML MDRD: 58 ML/MIN/1.73
GLOBULIN UR ELPH-MCNC: 3.8 GM/DL
GLUCOSE BLD-MCNC: 95 MG/DL (ref 65–99)
HAPTOGLOB SERPL-MCNC: 259 MG/DL (ref 30–200)
HCT VFR BLD AUTO: 32.7 % (ref 37.5–51)
HGB BLD-MCNC: 9.9 G/DL (ref 13–17.7)
HGB RETIC QN AUTO: 29.7 PG (ref 29.8–36.1)
IMM GRANULOCYTES # BLD AUTO: 0.01 10*3/MM3 (ref 0–0.05)
IMM GRANULOCYTES NFR BLD AUTO: 0.3 % (ref 0–0.5)
IMM RETICS NFR: 14.6 % (ref 3–15.8)
IRON 24H UR-MRATE: 45 MCG/DL (ref 59–158)
IRON SATN MFR SERPL: 15 % (ref 20–50)
LDH SERPL-CCNC: 204 U/L (ref 135–225)
LYMPHOCYTES # BLD AUTO: 1.18 10*3/MM3 (ref 0.7–3.1)
LYMPHOCYTES NFR BLD AUTO: 29.6 % (ref 19.6–45.3)
MCH RBC QN AUTO: 28.7 PG (ref 26.6–33)
MCHC RBC AUTO-ENTMCNC: 30.3 G/DL (ref 31.5–35.7)
MCV RBC AUTO: 94.8 FL (ref 79–97)
MONOCYTES # BLD AUTO: 0.31 10*3/MM3 (ref 0.1–0.9)
MONOCYTES NFR BLD AUTO: 7.8 % (ref 5–12)
NEUTROPHILS # BLD AUTO: 2.03 10*3/MM3 (ref 1.7–7)
NEUTROPHILS NFR BLD AUTO: 50.8 % (ref 42.7–76)
NRBC BLD AUTO-RTO: 0 /100 WBC (ref 0–0.2)
PLATELET # BLD AUTO: 183 10*3/MM3 (ref 140–450)
PMV BLD AUTO: 9.3 FL (ref 6–12)
POTASSIUM BLD-SCNC: 4.6 MMOL/L (ref 3.5–5.2)
PROT SERPL-MCNC: 7.6 G/DL (ref 6–8.5)
RBC # BLD AUTO: 3.45 10*6/MM3 (ref 4.14–5.8)
RETICS/RBC NFR AUTO: 1.09 % (ref 0.7–1.9)
SODIUM BLD-SCNC: 143 MMOL/L (ref 136–145)
TIBC SERPL-MCNC: 299 MCG/DL (ref 298–536)
TRANSFERRIN SERPL-MCNC: 201 MG/DL (ref 200–360)
VIT B12 BLD-MCNC: 694 PG/ML (ref 211–946)
WBC NRBC COR # BLD: 3.99 10*3/MM3 (ref 3.4–10.8)

## 2020-05-05 PROCEDURE — 86880 COOMBS TEST DIRECT: CPT | Performed by: INTERNAL MEDICINE

## 2020-05-05 PROCEDURE — 83615 LACTATE (LD) (LDH) ENZYME: CPT | Performed by: INTERNAL MEDICINE

## 2020-05-05 PROCEDURE — 83540 ASSAY OF IRON: CPT | Performed by: INTERNAL MEDICINE

## 2020-05-05 PROCEDURE — 84466 ASSAY OF TRANSFERRIN: CPT | Performed by: INTERNAL MEDICINE

## 2020-05-05 PROCEDURE — 85046 RETICYTE/HGB CONCENTRATE: CPT | Performed by: INTERNAL MEDICINE

## 2020-05-05 PROCEDURE — 83010 ASSAY OF HAPTOGLOBIN QUANT: CPT | Performed by: INTERNAL MEDICINE

## 2020-05-05 PROCEDURE — 80053 COMPREHEN METABOLIC PANEL: CPT | Performed by: INTERNAL MEDICINE

## 2020-05-05 PROCEDURE — 82728 ASSAY OF FERRITIN: CPT | Performed by: INTERNAL MEDICINE

## 2020-05-05 PROCEDURE — 85025 COMPLETE CBC W/AUTO DIFF WBC: CPT | Performed by: INTERNAL MEDICINE

## 2020-05-05 PROCEDURE — 36415 COLL VENOUS BLD VENIPUNCTURE: CPT

## 2020-05-05 PROCEDURE — 85014 HEMATOCRIT: CPT | Performed by: INTERNAL MEDICINE

## 2020-05-05 PROCEDURE — 82607 VITAMIN B-12: CPT | Performed by: INTERNAL MEDICINE

## 2020-05-05 PROCEDURE — 82747 ASSAY OF FOLIC ACID RBC: CPT | Performed by: INTERNAL MEDICINE

## 2020-05-05 NOTE — TELEPHONE ENCOUNTER
----- Message from George Phelan II, MD sent at 5/4/2020 11:22 AM EDT -----  Manuel Cordova,    Please ask him to come in for a lab appointment ideally at least 2 days before the visit with me this week.  The visit with me can be a video visit from his home.  However, I would like him to have a lab visit in our office at least 2 days prior to the video visit with me.  The lab visit should have the following labs:  Ferritin, iron panel, reticulate hemoglobin, CBC  B12, RBC folate  CMP, LDH, haptoglobin, Maki test  copying the oncology nurses to this message so they can place these orders.  Thanks to all.    Thanks!

## 2020-05-06 ENCOUNTER — TELEPHONE (OUTPATIENT)
Dept: INTERNAL MEDICINE | Age: 70
End: 2020-05-06

## 2020-05-06 NOTE — TELEPHONE ENCOUNTER
Will have nursing triage call in new Script for ELiquis 5 mg 1 BID. #60 instead of a starter pack like the refill request was for. Starter pack denied. Will send new script to [facility].

## 2020-05-06 NOTE — TELEPHONE ENCOUNTER
Eliquis should be managed by his hematologist. Please have him contact that office. Let us know if there is a problem.

## 2020-05-06 NOTE — TELEPHONE ENCOUNTER
PATIENT CALLED IN AND REQUESTED A REFILL OF Apixaban (ELIQUIS DVT/PE STARTER PACK) tablet SENT TO [facility] [address] , PATIENT CALL BACK [phone]

## 2020-05-07 LAB
FOLATE BLD-MCNC: 395 NG/ML
FOLATE RBC-MCNC: 1212 NG/ML
HCT VFR BLD AUTO: 32.6 % (ref 37.5–51)

## 2020-05-08 ENCOUNTER — TELEMEDICINE (OUTPATIENT)
Dept: ONCOLOGY | Facility: CLINIC | Age: 70
End: 2020-05-08

## 2020-05-08 ENCOUNTER — APPOINTMENT (OUTPATIENT)
Dept: OTHER | Facility: HOSPITAL | Age: 70
End: 2020-05-08

## 2020-05-08 ENCOUNTER — TELEPHONE (OUTPATIENT)
Dept: ONCOLOGY | Facility: CLINIC | Age: 70
End: 2020-05-08

## 2020-05-08 DIAGNOSIS — C15.9 ADENOCARCINOMA OF ESOPHAGUS (HCC): Primary | ICD-10-CM

## 2020-05-08 PROCEDURE — 99442 PR PHYS/QHP TELEPHONE EVALUATION 11-20 MIN: CPT | Performed by: INTERNAL MEDICINE

## 2020-05-08 NOTE — TELEPHONE ENCOUNTER
PT CALLING ASKING ABOUT BONE MARROW BX. READ TO HIM DR. GREEN'S NOTE FROM TODAY ABOUT POSS NEEDING BONE MARROW. PT V/U.

## 2020-05-15 RX ORDER — PANTOPRAZOLE SODIUM 40 MG/1
40 TABLET, DELAYED RELEASE ORAL
Qty: 180 TABLET | Refills: 3 | Status: SHIPPED | OUTPATIENT
Start: 2020-05-15 | End: 2021-09-07

## 2020-05-28 ENCOUNTER — CLINICAL SUPPORT (OUTPATIENT)
Dept: INTERNAL MEDICINE | Age: 70
End: 2020-05-28

## 2020-05-28 PROCEDURE — 96372 THER/PROPH/DIAG INJ SC/IM: CPT | Performed by: INTERNAL MEDICINE

## 2020-05-28 RX ADMIN — CYANOCOBALAMIN 1000 MCG: 1000 INJECTION, SOLUTION INTRAMUSCULAR; SUBCUTANEOUS at 10:35

## 2020-06-29 ENCOUNTER — CLINICAL SUPPORT (OUTPATIENT)
Dept: INTERNAL MEDICINE | Age: 70
End: 2020-06-29

## 2020-06-29 PROCEDURE — 96372 THER/PROPH/DIAG INJ SC/IM: CPT | Performed by: INTERNAL MEDICINE

## 2020-06-29 RX ADMIN — CYANOCOBALAMIN 1000 MCG: 1000 INJECTION, SOLUTION INTRAMUSCULAR; SUBCUTANEOUS at 10:29

## 2020-07-07 ENCOUNTER — LAB (OUTPATIENT)
Dept: OTHER | Facility: HOSPITAL | Age: 70
End: 2020-07-07

## 2020-07-07 DIAGNOSIS — C15.9 ADENOCARCINOMA OF ESOPHAGUS (HCC): ICD-10-CM

## 2020-07-07 LAB
BASOPHILS # BLD AUTO: 0.02 10*3/MM3 (ref 0–0.2)
BASOPHILS NFR BLD AUTO: 0.5 % (ref 0–1.5)
DEPRECATED RDW RBC AUTO: 48.3 FL (ref 37–54)
EOSINOPHIL # BLD AUTO: 0.21 10*3/MM3 (ref 0–0.4)
EOSINOPHIL NFR BLD AUTO: 4.8 % (ref 0.3–6.2)
ERYTHROCYTE [DISTWIDTH] IN BLOOD BY AUTOMATED COUNT: 14.3 % (ref 12.3–15.4)
FERRITIN SERPL-MCNC: 332 NG/ML (ref 30–400)
HCT VFR BLD AUTO: 37.5 % (ref 37.5–51)
HGB BLD-MCNC: 11.7 G/DL (ref 13–17.7)
HGB RETIC QN AUTO: 32.1 PG (ref 29.8–36.1)
IMM GRANULOCYTES # BLD AUTO: 0.01 10*3/MM3 (ref 0–0.05)
IMM GRANULOCYTES NFR BLD AUTO: 0.2 % (ref 0–0.5)
IMM RETICS NFR: 6.8 % (ref 3–15.8)
IRON 24H UR-MRATE: 49 MCG/DL (ref 59–158)
IRON SATN MFR SERPL: 17 % (ref 20–50)
LYMPHOCYTES # BLD AUTO: 1.78 10*3/MM3 (ref 0.7–3.1)
LYMPHOCYTES NFR BLD AUTO: 41.1 % (ref 19.6–45.3)
MCH RBC QN AUTO: 28.9 PG (ref 26.6–33)
MCHC RBC AUTO-ENTMCNC: 31.2 G/DL (ref 31.5–35.7)
MCV RBC AUTO: 92.6 FL (ref 79–97)
MONOCYTES # BLD AUTO: 0.35 10*3/MM3 (ref 0.1–0.9)
MONOCYTES NFR BLD AUTO: 8.1 % (ref 5–12)
NEUTROPHILS NFR BLD AUTO: 1.96 10*3/MM3 (ref 1.7–7)
NEUTROPHILS NFR BLD AUTO: 45.3 % (ref 42.7–76)
NRBC BLD AUTO-RTO: 0 /100 WBC (ref 0–0.2)
PLATELET # BLD AUTO: 160 10*3/MM3 (ref 140–450)
PMV BLD AUTO: 9.1 FL (ref 6–12)
RBC # BLD AUTO: 4.05 10*6/MM3 (ref 4.14–5.8)
RETICS/RBC NFR AUTO: 0.41 % (ref 0.7–1.9)
TIBC SERPL-MCNC: 294 MCG/DL (ref 298–536)
TRANSFERRIN SERPL-MCNC: 197 MG/DL (ref 200–360)
WBC # BLD AUTO: 4.33 10*3/MM3 (ref 3.4–10.8)

## 2020-07-07 PROCEDURE — 84466 ASSAY OF TRANSFERRIN: CPT | Performed by: INTERNAL MEDICINE

## 2020-07-07 PROCEDURE — 36415 COLL VENOUS BLD VENIPUNCTURE: CPT

## 2020-07-07 PROCEDURE — 85025 COMPLETE CBC W/AUTO DIFF WBC: CPT | Performed by: INTERNAL MEDICINE

## 2020-07-07 PROCEDURE — 85046 RETICYTE/HGB CONCENTRATE: CPT | Performed by: INTERNAL MEDICINE

## 2020-07-07 PROCEDURE — 82728 ASSAY OF FERRITIN: CPT | Performed by: INTERNAL MEDICINE

## 2020-07-07 PROCEDURE — 83540 ASSAY OF IRON: CPT | Performed by: INTERNAL MEDICINE

## 2020-07-13 NOTE — PROGRESS NOTES
Subjective .     REASONS FOR FOLLOWUP:  Esophageal cancer, cytopenias     HISTORY OF PRESENT ILLNESS:  The patient is a 70 y.o. year old male  who is here for follow-up with the above-mentioned history.    No new problems.  No bleeding.  No significant weight loss.  No pain.  No change in occasional shortness of breath.    Past Medical History:   Diagnosis Date   • Acute deep vein thrombosis (DVT) of popliteal vein of left lower extremity (CMS/HCC) 03/24/2020   • Anemia    • Anti-phospholipid syndrome (CMS/HCC)     On lifelong anticoagulation therapy   • Bladder disorder     LEAKAGE   ON MED  wers pads   • Community acquired pneumonia     HISTORY OF IN 2014   • Deep venous thrombosis (CMS/HCC) 2006, 2008    Left lower extremity multiple   • Depression    • Esophageal carcinoma (CMS/HCC) 12/31/2014    had chemo and radiation prior surgery   • Hemorrhoids    • HH (hiatus hernia)    • History of atrial fibrillation 2015    ONE EPISODE WHILE HOSPITALIZED   • History of kidney stones    • History of nephrolithiasis    • History of pancreatitis     PT STATES MANY YEARS AGO   • History of radiation therapy    • Hypertension    • Long-term (current) use of anticoagulants, INR goal 2.0-3.0    • Lymphedema    • Malignant neoplasm of prostate (CMS/HCC)    • Other hyperlipidemia 1/30/2018 January 30, 2018 lipid panel risk 12.8%   • Restless legs syndrome    • Shortness of breath    • Squamous carcinoma     on the head     Past Surgical History:   Procedure Laterality Date   • APPENDECTOMY  1950   • BRONCHOSCOPY      (Diagnostic)   • CATARACT EXTRACTION Bilateral 2014   • COLONOSCOPY  12/15/2014    Complete / Description: EH, IH, torts, stool, follow-up colonoscopy due in 5 years.   • COLONOSCOPY N/A 6/13/2017    non-thrombosed external hemorrhoids, normal examined ileum, IH   • COLONOSCOPY N/A 2/26/2019    Procedure: COLONOSCOPY with Cold Polypectomy;  Surgeon: Mulugeta Millan MD;  Location: [facility] ENDOSCOPY;   Service: Gastroenterology   • CYSTOSCOPY W/ LASER LITHOTRIPSY     • ENDOSCOPY N/A 6/13/2017    Procedure: ESOPHAGOGASTRODUODENOSCOPY WITH COLD BIOPSY;  Surgeon: Lake Gonzalez MD;  Location: [facility] ENDOSCOPY;  Service:    • ESOPHAGECTOMY      April 2015, stage IIB esophageal carcinoma, sub-total resection.   • ESOPHAGECTOMY      Esophagectomy Subtotal Andrea Joe Procedure   • EXCISION LESION  08/2012    Removal of Squamous Cell CA on Head   • HAMMER TOE REPAIR  09/2014    Hammertoe Operation (Each Toe), 10/2014   • HAMMER TOE REPAIR Left 10/3/2017    Procedure: Left second third and fourth distal interphalangeal joint resection with flexor tenotomy;  Surgeon: Mulugeta Lira MD;  Location:  [facility];  Service:    • HERNIA REPAIR      incisional   • JEJUNOSTOMY      Laparoscopic   • JEJUNOSTOMY      tube removal    • KNEE SURGERY Bilateral 1967, 1973, 1981   • PATELLA SURGERY Left     removed   • PILONIDAL CYST / SINUS EXCISION     • DE TOTAL KNEE ARTHROPLASTY Right 3/26/2018    Procedure: TOTAL KNEE ARTHROPLASTY;  Surgeon: Renny Solis MD;  Location: [facility] MAIN OR;  Service: Orthopedics   • PROSTATECTOMY  2010   • PYLOROPLASTY     • SPINAL FUSION  02/1998    C 5,6   • TONSILLECTOMY  1955   • TONSILLECTOMY     • UPPER GASTROINTESTINAL ENDOSCOPY  12/15/2014    LA Grade D esophagitis, Pardo's, HH, multiple duodenal ulcers   • VENTRAL/INCISIONAL HERNIA REPAIR N/A 4/14/2016    Procedure: VENTRAL/INCISIONAL HERNIA REPAIR, open, with mesh, and component separation;  Surgeon: Darren Rivas MD;  Location: [facility] OR;  Service:        HEMATOLOGIC/ONCOLOGIC HISTORY:  (History from previous dates can be found in the separate document.)    MEDICATIONS    Current Outpatient Medications:   •  albuterol sulfate  (90 Base) MCG/ACT inhaler, Inhale 1 puff Every 4 (Four) Hours As Needed for Wheezing., Disp: , Rfl:   •  Apixaban (ELIQUIS DVT/PE STARTER PACK) tablet, Take two 5 mg tablets by  mouth every 12 hours for 7 days. Followed by one 5 mg tablet every 12 hours. (Dispense starter pack if available), Disp: 74 tablet, Rfl: 0  •  apixaban (ELIQUIS) 5 MG tablet tablet, Take 1 tablet by mouth Every 12 (Twelve) Hours., Disp: 60 tablet, Rfl: 3  •  ferrous sulfate 325 (65 FE) MG tablet, Take 2 tablets by mouth Daily With Breakfast., Disp: 90 tablet, Rfl: 0  •  furosemide (LASIX) 20 MG tablet, TAKE ONE TABLET BY MOUTH DAILY, Disp: 30 tablet, Rfl: 11  •  metoprolol tartrate (LOPRESSOR) 25 MG tablet, Take 1 tablet by mouth 2 (Two) Times a Day., Disp: 180 tablet, Rfl: 3  •  pantoprazole (PROTONIX) 40 MG EC tablet, Take 1 tablet by mouth 2 (Two) Times a Day Before Meals., Disp: 180 tablet, Rfl: 3  •  PARoxetine (PAXIL) 40 MG tablet, Take 1 tablet by mouth Every Morning., Disp: 30 tablet, Rfl: 3  •  potassium chloride (MICRO-K) 10 MEQ CR capsule, Take 1 capsule by mouth Daily., Disp: 30 capsule, Rfl: 3  •  pramipexole (Mirapex) 1.5 MG tablet, Take 1 tablet by mouth 2 (Two) Times a Day., Disp: 180 tablet, Rfl: 1  •  STIOLTO RESPIMAT 2.5-2.5 MCG/ACT aerosol solution inhaler, Inhale 2 puffs Daily. (Patient taking differently: Inhale 2 puffs Daily. prn), Disp: , Rfl:   •  tamsulosin (FLOMAX) 0.4 MG capsule 24 hr capsule, Take 1 capsule by mouth Every Night., Disp: , Rfl:     Current Facility-Administered Medications:   •  cyanocobalamin injection 1,000 mcg, 1,000 mcg, Intramuscular, Q28 Days, Brandin Bolton MD, 1,000 mcg at 06/29/20 1029    ALLERGIES:   No Known Allergies    SOCIAL HISTORY:       Social History     Socioeconomic History   • Marital status:      Spouse name: Lena   • Number of children: 0   • Years of education: College   • Highest education level: Not on file   Occupational History   • Occupation: Retired      Comment: Retired 2014   Tobacco Use   • Smoking status: Former Smoker     Packs/day: 2.00     Years: 3.00     Pack years: 6.00     Types: Cigarettes     Last  attempt to quit: 1974     Years since quittin.5   • Smokeless tobacco: Never Used   • Tobacco comment: no caffeine    Substance and Sexual Activity   • Alcohol use: Yes     Frequency: 2-3 times a week     Comment: 2-3 x per week   • Drug use: No   • Sexual activity: Defer   Social History Narrative    self-employed          FAMILY HISTORY:  Family History   Problem Relation Age of Onset   • Cerebral aneurysm Mother         cerebral artery aneurysm ( age 56)   • Prostate cancer Brother 68   • Anxiety disorder Father    • Suicide Attempts Father          of suicide   • Cancer Father         bladder   • No Known Problems Brother    • Pancreatic cancer Nephew    • Colon cancer Neg Hx    • Esophageal cancer Neg Hx    • Dementia Neg Hx        REVIEW OF SYSTEMS:    Review of Systems   Constitutional: Negative for activity change.   HENT: Negative for nosebleeds and trouble swallowing.    Respiratory: Negative for shortness of breath and wheezing.    Cardiovascular: Negative for chest pain and palpitations.   Gastrointestinal: Negative for constipation and diarrhea.   Genitourinary: Negative for dysuria and hematuria.   Musculoskeletal: Positive for back pain. Negative for arthralgias and myalgias.   Neurological: Negative for seizures and syncope.   Hematological: Negative for adenopathy. Does not bruise/bleed easily.   Psychiatric/Behavioral: Negative for confusion.           Objective    There were no vitals filed for this visit.  Current Status 2020   ECOG score 0      PHYSICAL EXAM:          CONSTITUTIONAL:  Vital signs reviewed.  No distress, looks comfortable.  EYES:  Conjunctiva and lids unremarkable.  PERRLA  EARS,NOSE,MOUTH,THROAT:  Ears and nose appear unremarkable.  Lips, teeth, gums appear unremarkable.  RESPIRATORY:  Normal respiratory effort.  Lungs clear to auscultation bilaterally.  CARDIOVASCULAR:  Normal S1, S2.  No murmurs rubs or gallops.  No significant lower extremity  edema.  GASTROINTESTINAL: Abdomen appears unremarkable.  Nontender.  No hepatomegaly.  No splenomegaly.  LYMPHATIC:  No cervical, supraclavicular, axillary lymphadenopathy.  SKIN:  Warm.  No rashes.  PSYCHIATRIC:  Normal judgment and insight.  Normal mood and affect.   RECENT LABS:        WBC   Date/Time Value Ref Range Status   07/07/2020 12:44 PM 4.33 3.40 - 10.80 10*3/mm3 Final   04/15/2019 12:31 PM 3.53 3.40 - 10.80 10*3/mm3 Final   04/16/2018 04:25 PM 4.23 (L) 4.5 - 11.0 10*3/uL Final     Hemoglobin   Date/Time Value Ref Range Status   07/07/2020 12:44 PM 11.7 (L) 13.0 - 17.7 g/dL Final   04/16/2018 04:25 PM 9.9 (L) 13.5 - 17.5 g/dL Final     Platelets   Date/Time Value Ref Range Status   07/07/2020 12:44  140 - 450 10*3/mm3 Final   04/16/2018 04:25  140 - 440 10*3/uL Final       Assessment/Plan     ASSESSMENT:  There are no diagnoses linked to this encounter.    *Esophageal adenocarcinoma. Initially T2N0M0. After neoadjuvant carboplatin/Taxol with radiation, achieved a pathologic CR at resection, 4/24/2015.   · As per NCCN guidelines, plan CAT scans every 6 months x1 year, then every 6 to 9 months on years 2 and years 3. Defer any surveillance EGDs to Dr. Gonzalez if he feels they are appropriate.   · Also as per NCCN guidelines, plan H and P every 3 to 6 months on years 1 and years 2, then every 6 to 12 months' time on years 3 through years 5 and then annually.   · EGD 6/13/17 by Dr. Gonzalez: No evidence of recurrence.  · CT scan 3/2/18 (this completed 3 years of surveillance CT): No evidence of recurrence.  Plan no more surveillance CT.  No evidence of recurrence.  Remission continues.    *Recurrent DVT, prior to cancer diagnosis.    · Dr. Bolton manages his Coumadin.   · Chronic left leg larger than right, since DVT  · Acute left popliteal, gastrocnemius DVT on 3/24/2020 despite INR 3.4.  Therefore, changed to Eliquis.  · On 3/25/2020, unremarkable: Lupus anticoagulant, beta-2 glycoprotein antibodies.   Anticardiolipin antibodies also felt to be unremarkable with IgG and IgM both at 15 (negative is <15.  Indeterminate is 15-20).    *CT angiogram chest 3/24/2020 (LLE acute DVT found 3/24/2020): Mild increased opacity LLL may represent developing infiltrate versus atelectasis.  Radiologist recommended follow-up.  · CT chest 5/1/2020: Resolution of groundglass LLL infiltrate from the 3/24/2020 CT.  A few mildly enlarged mediastinal nodes are less prominent than on the 3/18/2020 CT.  No new abnormalities.  Plan no more follow-up CT scans.    *Leukocytopenia, anemia, thrombocytopenia.   ·  He had a white blood cell count in the upper 3s and a hemoglobin in the upper 12s with a normal platelet count prior to beginning chemotherapy. We will monitor this.   WBC 4.3    *Persistent cough.  He has seen Dr. Maher Sayied for this.    He did not complain of this today.    *Previously complained of emesis occurring 5 hours after eating on average once every month or 2.  No nausea otherwise.  Denies dysphagia or odynophagia.    Unchanged.  Occurring on average once every couple of months.  He did not complain of this today    *Iron deficiency anemia  · Hb mostly around 11  · On 4/15/2019, ferritin 29.7, 13% saturation.  Serum folate normal.  · On oral iron daily through PCP.  · On 8/23/19, ferritin 65, 14%.   · Increased oral iron.   · On 10/15/19, ferritin 72, 10%.  · On 10/25/2019, stopped oral iron since it was not helping.    · 2 doses Injectafer.  · On 12/13/2019, ferritin 708, 15% saturation, Hb 10.4.  · Therefore, no IV iron.  Monitor.  · On 5/5/2020, ferritin 346, 15% saturation, Hb 9.9.  Therefore, no need for IV iron at this time.  · On 7/7/2020, Hb up to 11.7.  Iron labs normal.    *Source of iron deficiency.  · Last colonoscopy 2/26/2019 by Dr. Millan.  Last EGD 6/13/2017 by Dr. Gonzalez.  · Suspect he has an absorption issue due to previous esophageal surgery.    *B12 deficiency.  · On 6/6/2019, B12 <150  · On B12  injections through PCP.  B12 on 5/5/2020 normal at 694    *Anemia for reasons in addition to iron deficiency and B12 deficiency  · On 5/5/2020, unremarkable: RBC folate, iron labs, B12, haptoglobin, TB, LDH, direct Maki.  · Creatinine baseline is 0.9-1.2   · Hb 9.9 on 5/5/2020  · On 7/7/2020, Hb up to 11.7.  Return to prior follow-up plan.    PLAN:  · Return to prior 6-month follow-up plan.  · No more surveillance CT scans.  · He does not have a port.  ·   Previously, I have discussed with him if Hb drops and remains around 9 or so, we may want to plan a bone marrow biopsy

## 2020-07-14 ENCOUNTER — APPOINTMENT (OUTPATIENT)
Dept: OTHER | Facility: HOSPITAL | Age: 70
End: 2020-07-14

## 2020-07-14 ENCOUNTER — OFFICE VISIT (OUTPATIENT)
Dept: ONCOLOGY | Facility: CLINIC | Age: 70
End: 2020-07-14

## 2020-07-14 VITALS
TEMPERATURE: 97.7 F | DIASTOLIC BLOOD PRESSURE: 76 MMHG | OXYGEN SATURATION: 99 % | BODY MASS INDEX: 23.38 KG/M2 | WEIGHT: 198 LBS | RESPIRATION RATE: 16 BRPM | HEIGHT: 77 IN | SYSTOLIC BLOOD PRESSURE: 129 MMHG | HEART RATE: 57 BPM

## 2020-07-14 DIAGNOSIS — C15.9 ADENOCARCINOMA OF ESOPHAGUS (HCC): Primary | ICD-10-CM

## 2020-07-14 PROCEDURE — 99214 OFFICE O/P EST MOD 30 MIN: CPT | Performed by: INTERNAL MEDICINE

## 2020-07-27 ENCOUNTER — OFFICE VISIT (OUTPATIENT)
Dept: INTERNAL MEDICINE | Age: 70
End: 2020-07-27

## 2020-07-27 VITALS
HEART RATE: 66 BPM | WEIGHT: 202.8 LBS | OXYGEN SATURATION: 98 % | SYSTOLIC BLOOD PRESSURE: 118 MMHG | BODY MASS INDEX: 23.95 KG/M2 | HEIGHT: 77 IN | DIASTOLIC BLOOD PRESSURE: 62 MMHG

## 2020-07-27 DIAGNOSIS — R25.1 TREMOR OF BOTH HANDS: ICD-10-CM

## 2020-07-27 DIAGNOSIS — I82.432 ACUTE DEEP VEIN THROMBOSIS (DVT) OF POPLITEAL VEIN OF LEFT LOWER EXTREMITY (HCC): ICD-10-CM

## 2020-07-27 DIAGNOSIS — F33.41 RECURRENT MAJOR DEPRESSIVE DISORDER, IN PARTIAL REMISSION (HCC): Chronic | ICD-10-CM

## 2020-07-27 DIAGNOSIS — I10 ESSENTIAL HYPERTENSION: Primary | Chronic | ICD-10-CM

## 2020-07-27 DIAGNOSIS — G25.81 RLS (RESTLESS LEGS SYNDROME): Chronic | ICD-10-CM

## 2020-07-27 DIAGNOSIS — E53.8 B12 DEFICIENCY: Chronic | ICD-10-CM

## 2020-07-27 DIAGNOSIS — Z23 NEED FOR PNEUMOCOCCAL VACCINE: ICD-10-CM

## 2020-07-27 PROCEDURE — G0009 ADMIN PNEUMOCOCCAL VACCINE: HCPCS | Performed by: INTERNAL MEDICINE

## 2020-07-27 PROCEDURE — 96372 THER/PROPH/DIAG INJ SC/IM: CPT | Performed by: INTERNAL MEDICINE

## 2020-07-27 PROCEDURE — 99214 OFFICE O/P EST MOD 30 MIN: CPT | Performed by: INTERNAL MEDICINE

## 2020-07-27 PROCEDURE — 90732 PPSV23 VACC 2 YRS+ SUBQ/IM: CPT | Performed by: INTERNAL MEDICINE

## 2020-07-27 RX ADMIN — CYANOCOBALAMIN 1000 MCG: 1000 INJECTION, SOLUTION INTRAMUSCULAR; SUBCUTANEOUS at 12:10

## 2020-07-27 NOTE — PROGRESS NOTES
"[facility] INTERNAL MEDICINE  RICHARDSON HEALY M.D.      Jose FITCH Hurtado / 70 y.o. / male  07/27/2020      VITAL SIGNS     Visit Vitals  /62   Pulse 66   Ht 195.6 cm (77\")   Wt 92 kg (202 lb 12.8 oz)   SpO2 98%   BMI 24.05 kg/m²       BP Readings from Last 3 Encounters:   07/27/20 118/62   07/14/20 129/76   03/31/20 130/60     Wt Readings from Last 3 Encounters:   07/27/20 92 kg (202 lb 12.8 oz)   07/14/20 89.8 kg (198 lb)   03/31/20 95.3 kg (210 lb)     Body mass index is 24.05 kg/m².      MEDICATIONS     Current Outpatient Medications   Medication Sig Dispense Refill   • albuterol sulfate  (90 Base) MCG/ACT inhaler Inhale 1 puff Every 4 (Four) Hours As Needed for Wheezing.     • Apixaban (ELIQUIS DVT/PE STARTER PACK) tablet Take two 5 mg tablets by mouth every 12 hours for 7 days. Followed by one 5 mg tablet every 12 hours. (Dispense starter pack if available) 74 tablet 0   • apixaban (ELIQUIS) 5 MG tablet tablet Take 1 tablet by mouth Every 12 (Twelve) Hours. 60 tablet 3   • furosemide (LASIX) 20 MG tablet TAKE ONE TABLET BY MOUTH DAILY 30 tablet 11   • metoprolol tartrate (LOPRESSOR) 25 MG tablet Take 1 tablet by mouth 2 (Two) Times a Day. 180 tablet 3   • PARoxetine (PAXIL) 40 MG tablet Take 1 tablet by mouth Every Morning. 30 tablet 3   • potassium chloride (MICRO-K) 10 MEQ CR capsule Take 1 capsule by mouth Daily. 30 capsule 3   • pramipexole (Mirapex) 1.5 MG tablet Take 1 tablet by mouth 2 (Two) Times a Day. 180 tablet 1   • ferrous sulfate 325 (65 FE) MG tablet Take 2 tablets by mouth Daily With Breakfast. 90 tablet 0     Current Facility-Administered Medications   Medication Dose Route Frequency Provider Last Rate Last Dose   • cyanocobalamin injection 1,000 mcg  1,000 mcg Intramuscular Q28 Days Brandin Healy MD   1,000 mcg at 07/27/20 1210       CHIEF COMPLAINT     Tremors      ASSESSMENT & PLAN     Problem List Items Addressed This Visit        High    Hypertension - Primary (Chronic)    Current " Assessment & Plan     Continue metoprolol tartrate 25 mg BID.          Relevant Medications    metoprolol tartrate (LOPRESSOR) 25 MG tablet    furosemide (LASIX) 20 MG tablet       Medium    Acute deep vein thrombosis (DVT) of popliteal vein of left lower extremity (CMS/HCC)    Current Assessment & Plan     Continue Eliquis.             Low    RLS (restless legs syndrome) (Chronic)    Current Assessment & Plan     Continue pramipexole 1.5 mg BID.          Relevant Medications    pramipexole (Mirapex) 1.5 MG tablet    Recurrent major depressive disorder, in partial remission (CMS/HCC) (Chronic)    Current Assessment & Plan     Continue paroxetine 40 mg qd.          Relevant Medications    PARoxetine (PAXIL) 40 MG tablet    B12 deficiency (Chronic)    Overview     Continue B12 injections monthly.          Relevant Medications    cyanocobalamin injection 1,000 mcg    Tremor of both hands    Current Assessment & Plan     Not worse. Monitor.            Other Visit Diagnoses     Need for pneumococcal vaccine        Relevant Orders    Pneumococcal Polysaccharide Vaccine 23-Valent Greater Than or Equal To 1yo Subcutaneous / IM (Completed)        Orders Placed This Encounter   Procedures   • Pneumococcal Polysaccharide Vaccine 23-Valent Greater Than or Equal To 1yo Subcutaneous / IM     No orders of the defined types were placed in this encounter.      Summary/Discussion:  Advance Care Planning   • ACP discussion was held with the patient during this visit. Patient has an advance directive in EMR which is still valid.       Next Appointment with me: 12/2/2020    Return in about 4 months (around 11/27/2020) for COMBINED SUBSEQUENT AWV and medical f/u (30 min).    _____________________________________________________________________________________    HISTORY OF PRESENT ILLNESS     He reports to having stopped tamsulosin, pantoprazole and Stiolto.     Hypertension remains stable on metoprolol tartrate.   Depression overall  stable on paroxetine 40 mg.   Restless leg syndrome on pramipexole   On Eliquis for DVT without bruising/bleding  Esophageal cancer followed by Dr. Phelan.   Needs B12 injection.       Patient Care Team:  Brandin Bolton MD as PCP - General (Internal Medicine)  Tapan, George THORPE II, MD as Consulting Physician (Hematology and Oncology)  Jose Inman MD as Consulting Physician (Urology)  Mulugeta Millan MD as Consulting Physician (Gastroenterology)  Seth Randhawa MD as Consulting Physician (Ophthalmology)      REVIEW OF SYSTEMS     Review of Systems  No fever  No chest pain or shortness of breath  GI neg for melena or bleeding   no difference not taking tamsulosin  Psych stable depression; no suicidal ideation       PHYSICAL EXAMINATION     Physical Exam  Constitutional  No distress  Cardiovascular Rate  normal . Rhythm: regular . Heart sounds:  Normal  Pulmonary/Chest: Effort normal and breath sounds normal.    Psychiatric  Alert. Judgment and thought content normal. Mood stable     REVIEWED DATA     Labs:     Lab Results   Component Value Date     05/05/2020    K 4.6 05/05/2020    CALCIUM 9.2 05/05/2020    AST 24 05/05/2020    ALT 15 05/05/2020    BUN 20 05/05/2020    CREATININE 1.22 05/05/2020    CREATININE 1.20 05/01/2020    CREATININE 0.97 03/27/2020    EGFRIFNONA 58 (L) 05/05/2020    EGFRIFAFRI 97 05/10/2018       Lab Results   Component Value Date    HGBA1C 6.20 (H) 01/17/2020     (H) 05/10/2018    GLU 91 04/16/2018       Lab Results   Component Value Date    LDL 92 05/10/2018     (H) 01/30/2018    LDL 98 01/26/2017    HDL 45 05/10/2018    HDL 55 01/30/2018    HDL 67 (H) 01/26/2017    TRIG 79 05/10/2018    TRIG 74 01/30/2018    TRIG 47 01/26/2017       Lab Results   Component Value Date    TSH 2.710 01/17/2020    FREET4 1.52 01/17/2020       Lab Results   Component Value Date    WBC 4.33 07/07/2020    HGB 11.7 (L) 07/07/2020    HGB 9.9 (L) 05/05/2020    HGB 8.3 (L) 03/27/2020      07/07/2020       Lab Results   Component Value Date    GLUCOSEU Negative 02/10/2020    BLOODU Small (1+) (A) 02/10/2020    NITRITEU Negative 02/10/2020    LEUKOCYTESUR Small (1+) (A) 02/10/2020       Imaging:         Medical Tests:         Summary of old records / correspondence / consultant report:         Request outside records:         ALLERGIES  No Known Allergies     PFSH:     The following portions of the patient's history were reviewed and updated as appropriate: Allergies / Current Medications / Past Medical History / Surgical History / Social History / Family History    PROBLEM LIST   Patient Active Problem List   Diagnosis   • Adenocarcinoma of esophagus (CMS/HCC)   • Adenomatous polyp of colon   • Malignant neoplasm of prostate (CMS/HCC)   • RLS (restless legs syndrome)   • Recurrent major depressive disorder, in partial remission (CMS/HCC)   • Hypertension   • Anemia   • Insomnia due to other mental disorder   • Peripheral polyneuropathy   • B12 deficiency   • Postsurgical malabsorption   • Shortness of breath   • Lymphedema   • Iron deficiency   • Gastroparesis   • Acute deep vein thrombosis (DVT) of popliteal vein of left lower extremity (CMS/HCC)   • Tremor of both hands       PAST MEDICAL HISTORY  Past Medical History:   Diagnosis Date   • Acute deep vein thrombosis (DVT) of popliteal vein of left lower extremity (CMS/HCC) 03/24/2020   • Anemia    • Anti-phospholipid syndrome (CMS/HCC)     On lifelong anticoagulation therapy   • Bladder disorder     LEAKAGE   ON MED  wers pads   • Community acquired pneumonia     HISTORY OF IN 2014   • Deep venous thrombosis (CMS/HCC) 2006, 2008    Left lower extremity multiple   • Depression    • Esophageal carcinoma (CMS/HCC) 12/31/2014    had chemo and radiation prior surgery   • Hemorrhoids    • HH (hiatus hernia)    • History of atrial fibrillation 2015    ONE EPISODE WHILE HOSPITALIZED   • History of kidney stones    • History of nephrolithiasis    •  History of pancreatitis     PT STATES MANY YEARS AGO   • History of radiation therapy    • Hypertension    • Long-term (current) use of anticoagulants, INR goal 2.0-3.0    • Lymphedema    • Malignant neoplasm of prostate (CMS/HCC)    • Other hyperlipidemia 1/30/2018 January 30, 2018 lipid panel risk 12.8%   • Restless legs syndrome    • Shortness of breath    • Squamous carcinoma     on the head       SURGICAL HISTORY  Past Surgical History:   Procedure Laterality Date   • APPENDECTOMY  1950   • BRONCHOSCOPY      (Diagnostic)   • CATARACT EXTRACTION Bilateral 2014   • COLONOSCOPY  12/15/2014    Complete / Description: EH, IH, torts, stool, follow-up colonoscopy due in 5 years.   • COLONOSCOPY N/A 6/13/2017    non-thrombosed external hemorrhoids, normal examined ileum, IH   • COLONOSCOPY N/A 2/26/2019    Procedure: COLONOSCOPY with Cold Polypectomy;  Surgeon: Mulugeta Millan MD;  Location: [facility] ENDOSCOPY;  Service: Gastroenterology   • CYSTOSCOPY W/ LASER LITHOTRIPSY     • ENDOSCOPY N/A 6/13/2017    Procedure: ESOPHAGOGASTRODUODENOSCOPY WITH COLD BIOPSY;  Surgeon: Lake Gonzalez MD;  Location: [facility] ENDOSCOPY;  Service:    • ESOPHAGECTOMY      April 2015, stage IIB esophageal carcinoma, sub-total resection.   • ESOPHAGECTOMY      Esophagectomy Subtotal Andrea Joe Procedure   • EXCISION LESION  08/2012    Removal of Squamous Cell CA on Head   • HAMMER TOE REPAIR  09/2014    Hammertoe Operation (Each Toe), 10/2014   • HAMMER TOE REPAIR Left 10/3/2017    Procedure: Left second third and fourth distal interphalangeal joint resection with flexor tenotomy;  Surgeon: Mulugeta Lira MD;  Location: [facility] OR [facility];  Service:    • HERNIA REPAIR      incisional   • JEJUNOSTOMY      Laparoscopic   • JEJUNOSTOMY      tube removal    • KNEE SURGERY Bilateral 1967, 1973, 1981   • PATELLA SURGERY Left     removed   • PILONIDAL CYST / SINUS EXCISION     • IL TOTAL KNEE ARTHROPLASTY Right 3/26/2018    Procedure:  TOTAL KNEE ARTHROPLASTY;  Surgeon: Renny Solis MD;  Location: [facility];  Service: Orthopedics   • PROSTATECTOMY     • PYLOROPLASTY     • SPINAL FUSION  1998    C 5,6   • TONSILLECTOMY     • TONSILLECTOMY     • UPPER GASTROINTESTINAL ENDOSCOPY  12/15/2014    LA Grade D esophagitis, Pardo's, HH, multiple duodenal ulcers   • VENTRAL/INCISIONAL HERNIA REPAIR N/A 2016    Procedure: VENTRAL/INCISIONAL HERNIA REPAIR, open, with mesh, and component separation;  Surgeon: Darren Rivas MD;  Location: [facility] OR;  Service:        SOCIAL HISTORY  Social History     Socioeconomic History   • Marital status:      Spouse name: Lena   • Number of children: 0   • Years of education: College   • Highest education level: Not on file   Occupational History   • Occupation: Retired      Comment: Retired    Tobacco Use   • Smoking status: Former Smoker     Packs/day: 2.00     Years: 3.00     Pack years: 6.00     Types: Cigarettes     Last attempt to quit: 1974     Years since quittin.6   • Smokeless tobacco: Never Used   • Tobacco comment: no caffeine    Substance and Sexual Activity   • Alcohol use: Yes     Frequency: 2-3 times a week     Comment: 2-3 x per week   • Drug use: No   • Sexual activity: Defer   Social History Narrative    self-employed        FAMILY HISTORY  Family History   Problem Relation Age of Onset   • Cerebral aneurysm Mother         cerebral artery aneurysm ( age 56)   • Prostate cancer Brother 68   • Anxiety disorder Father    • Suicide Attempts Father          of suicide   • Cancer Father         bladder   • No Known Problems Brother    • Pancreatic cancer Nephew    • Colon cancer Neg Hx    • Esophageal cancer Neg Hx    • Dementia Neg Hx          **Dragon Disclaimer:   Much of this encounter note is an electronic transcription/translation of spoken language to printed text. The electronic translation of spoken language  may permit erroneous, or at times, nonsensical words or phrases to be inadvertently transcribed. Although I have reviewed the note for such errors, some may still exist.     Template created by Moose Bolton MD

## 2020-08-27 ENCOUNTER — CLINICAL SUPPORT (OUTPATIENT)
Dept: INTERNAL MEDICINE | Age: 70
End: 2020-08-27

## 2020-08-27 PROCEDURE — 96372 THER/PROPH/DIAG INJ SC/IM: CPT | Performed by: INTERNAL MEDICINE

## 2020-08-27 RX ADMIN — CYANOCOBALAMIN 1000 MCG: 1000 INJECTION, SOLUTION INTRAMUSCULAR; SUBCUTANEOUS at 09:57

## 2020-08-29 DIAGNOSIS — F32.9 MAJOR DEPRESSIVE DISORDER WITH CURRENT ACTIVE EPISODE, UNSPECIFIED DEPRESSION EPISODE SEVERITY, UNSPECIFIED WHETHER RECURRENT: ICD-10-CM

## 2020-08-31 RX ORDER — PAROXETINE HYDROCHLORIDE 40 MG/1
TABLET, FILM COATED ORAL
Qty: 30 TABLET | Refills: 2 | Status: SHIPPED | OUTPATIENT
Start: 2020-08-31 | End: 2020-11-29

## 2020-09-01 ENCOUNTER — OFFICE VISIT (OUTPATIENT)
Dept: CARDIOLOGY | Facility: CLINIC | Age: 70
End: 2020-09-01

## 2020-09-01 VITALS
SYSTOLIC BLOOD PRESSURE: 128 MMHG | HEIGHT: 77 IN | DIASTOLIC BLOOD PRESSURE: 72 MMHG | OXYGEN SATURATION: 96 % | WEIGHT: 200.4 LBS | HEART RATE: 68 BPM | BODY MASS INDEX: 23.66 KG/M2

## 2020-09-01 DIAGNOSIS — I10 ESSENTIAL HYPERTENSION: Primary | Chronic | ICD-10-CM

## 2020-09-01 PROCEDURE — 93000 ELECTROCARDIOGRAM COMPLETE: CPT | Performed by: INTERNAL MEDICINE

## 2020-09-01 PROCEDURE — 99213 OFFICE O/P EST LOW 20 MIN: CPT | Performed by: INTERNAL MEDICINE

## 2020-09-01 NOTE — PROGRESS NOTES
Date of Office Visit: 2020    Patient Name: Jose Hurtado  : 1950    Encounter Provider: James Cyr MD  Referring Provider: No ref. provider found  Place of Service: [facility] CARDIOLOGY  Patient Care Team:  Brandin Bolton MD as PCP - General (Internal Medicine)  Code, George THORPE II, MD as Consulting Physician (Hematology and Oncology)  Jose Inman MD as Consulting Physician (Urology)  Mulugeta Millan MD as Consulting Physician (Gastroenterology)  Seth Randhawa MD as Consulting Physician (Ophthalmology)      Chief Complaint   Patient presents with   • Shortness of Breath     6 month f/u    • Hypertension     History of Present Illness    Patient is a 70-year-old male with a history of hypertension as well as chronic dyspnea who returns to the office today for follow-up.  Patient states since his last visit he is actually feeling fairly well.  He had previously been placed on metoprolol because of shortness of breath associated with a hyperdynamic left ventricle.  He does have a history of chronic deep venous thrombosis as well as a hypercoagulable state.  He presently remains on apixaban.    Past Medical History:   Diagnosis Date   • Acute deep vein thrombosis (DVT) of popliteal vein of left lower extremity (CMS/HCC) 2020   • Anemia    • Anti-phospholipid syndrome (CMS/HCC)     On lifelong anticoagulation therapy   • Bladder disorder     LEAKAGE   ON MED  wers pads   • Community acquired pneumonia     HISTORY OF IN    • Deep venous thrombosis (CMS/HCC) ,     Left lower extremity multiple   • Depression    • Esophageal carcinoma (CMS/HCC) 2014    had chemo and radiation prior surgery   • Hemorrhoids    • HH (hiatus hernia)    • History of atrial fibrillation 2015    ONE EPISODE WHILE HOSPITALIZED   • History of kidney stones    • History of nephrolithiasis    • History of pancreatitis     PT STATES MANY YEARS AGO   •  History of radiation therapy    • Hypertension    • Long-term (current) use of anticoagulants, INR goal 2.0-3.0    • Lymphedema    • Malignant neoplasm of prostate (CMS/HCC)    • Other hyperlipidemia 1/30/2018 January 30, 2018 lipid panel risk 12.8%   • Restless legs syndrome    • Shortness of breath    • Squamous carcinoma     on the head         Past Surgical History:   Procedure Laterality Date   • APPENDECTOMY  1950   • BRONCHOSCOPY      (Diagnostic)   • CATARACT EXTRACTION Bilateral 2014   • COLONOSCOPY  12/15/2014    Complete / Description: EH, IH, torts, stool, follow-up colonoscopy due in 5 years.   • COLONOSCOPY N/A 6/13/2017    non-thrombosed external hemorrhoids, normal examined ileum, IH   • COLONOSCOPY N/A 2/26/2019    Procedure: COLONOSCOPY with Cold Polypectomy;  Surgeon: Mulugeta Millan MD;  Location: [facility] ENDOSCOPY;  Service: Gastroenterology   • CYSTOSCOPY W/ LASER LITHOTRIPSY     • ENDOSCOPY N/A 6/13/2017    Procedure: ESOPHAGOGASTRODUODENOSCOPY WITH COLD BIOPSY;  Surgeon: Lake Gonzalez MD;  Location: [facility] ENDOSCOPY;  Service:    • ESOPHAGECTOMY      April 2015, stage IIB esophageal carcinoma, sub-total resection.   • ESOPHAGECTOMY      Esophagectomy Subtotal Andrea Joe Procedure   • EXCISION LESION  08/2012    Removal of Squamous Cell CA on Head   • HAMMER TOE REPAIR  09/2014    Hammertoe Operation (Each Toe), 10/2014   • HAMMER TOE REPAIR Left 10/3/2017    Procedure: Left second third and fourth distal interphalangeal joint resection with flexor tenotomy;  Surgeon: Mulugeta Lira MD;  Location: [facility] OR [facility];  Service:    • HERNIA REPAIR      incisional   • JEJUNOSTOMY      Laparoscopic   • JEJUNOSTOMY      tube removal    • KNEE SURGERY Bilateral 1967, 1973, 1981   • PATELLA SURGERY Left     removed   • PILONIDAL CYST / SINUS EXCISION     • CA TOTAL KNEE ARTHROPLASTY Right 3/26/2018    Procedure: TOTAL KNEE ARTHROPLASTY;  Surgeon: Renny Solis MD;  Location: [facility]  MAIN OR;  Service: Orthopedics   • PROSTATECTOMY  2010   • PYLOROPLASTY     • SPINAL FUSION  02/1998    C 5,6   • TONSILLECTOMY  1955   • TONSILLECTOMY     • UPPER GASTROINTESTINAL ENDOSCOPY  12/15/2014    LA Grade D esophagitis, Pardo's, HH, multiple duodenal ulcers   • VENTRAL/INCISIONAL HERNIA REPAIR N/A 4/14/2016    Procedure: VENTRAL/INCISIONAL HERNIA REPAIR, open, with mesh, and component separation;  Surgeon: Darren Rivas MD;  Location: [facility] MAIN OR;  Service:            Current Outpatient Medications:   •  albuterol sulfate  (90 Base) MCG/ACT inhaler, Inhale 1 puff Every 4 (Four) Hours As Needed for Wheezing., Disp: , Rfl:   •  apixaban (ELIQUIS) 5 MG tablet tablet, Take 1 tablet by mouth Every 12 (Twelve) Hours., Disp: 60 tablet, Rfl: 3  •  furosemide (LASIX) 20 MG tablet, TAKE ONE TABLET BY MOUTH DAILY, Disp: 30 tablet, Rfl: 11  •  metoprolol tartrate (LOPRESSOR) 25 MG tablet, Take 1 tablet by mouth 2 (Two) Times a Day., Disp: 180 tablet, Rfl: 3  •  pantoprazole (PROTONIX) 40 MG EC tablet, Take 1 tablet by mouth 2 (Two) Times a Day Before Meals., Disp: 180 tablet, Rfl: 3  •  PARoxetine (PAXIL) 40 MG tablet, TAKE ONE TABLET BY MOUTH EVERY MORNING, Disp: 30 tablet, Rfl: 2  •  potassium chloride (MICRO-K) 10 MEQ CR capsule, Take 1 capsule by mouth Daily., Disp: 30 capsule, Rfl: 3  •  pramipexole (Mirapex) 1.5 MG tablet, Take 1 tablet by mouth 2 (Two) Times a Day., Disp: 180 tablet, Rfl: 1  •  STIOLTO RESPIMAT 2.5-2.5 MCG/ACT aerosol solution inhaler, Inhale 2 puffs Daily. (Patient taking differently: Inhale 2 puffs Daily. prn), Disp: , Rfl:   •  Apixaban (ELIQUIS DVT/PE STARTER PACK) tablet, Take two 5 mg tablets by mouth every 12 hours for 7 days. Followed by one 5 mg tablet every 12 hours. (Dispense starter pack if available), Disp: 74 tablet, Rfl: 0    Current Facility-Administered Medications:   •  cyanocobalamin injection 1,000 mcg, 1,000 mcg, Intramuscular, Q28 Days, Brandin Bolton,  "MD, 1,000 mcg at 20 0957      Social History     Socioeconomic History   • Marital status:      Spouse name: Lena   • Number of children: 0   • Years of education: College   • Highest education level: Not on file   Occupational History   • Occupation: Retired      Comment: Retired    Tobacco Use   • Smoking status: Former Smoker     Packs/day: 2.00     Years: 3.00     Pack years: 6.00     Types: Cigarettes     Last attempt to quit: 1974     Years since quittin.6   • Smokeless tobacco: Never Used   • Tobacco comment: no caffeine    Substance and Sexual Activity   • Alcohol use: Yes     Frequency: 2-3 times a week     Comment: 2-3 x per week   • Drug use: No   • Sexual activity: Defer   Social History Narrative    self-employed          Review of Systems   Constitution: Positive for malaise/fatigue.   HENT: Negative.    Eyes: Negative.    Cardiovascular: Positive for leg swelling.   Respiratory: Negative.    Endocrine: Negative.    Skin: Negative.    Musculoskeletal: Negative.    Gastrointestinal: Negative.    Neurological: Negative.    Psychiatric/Behavioral: Negative.        Procedures      ECG 12 Lead  Date/Time: 2020 1:38 PM  Performed by: James Cyr MD  Authorized by: James Cyr MD   Comparison: compared with previous ECG from 3/24/2020  Comparison to previous ECG: PVCs no longer present  Rhythm: sinus rhythm  Rate: normal  Conduction: conduction normal  ST Segments: ST segments normal  T Waves: T waves normal  QRS axis: normal                  Objective:    /72 (BP Location: Left arm, Patient Position: Sitting)   Pulse 68   Ht 195.6 cm (77\")   Wt 90.9 kg (200 lb 6.4 oz)   SpO2 96%   BMI 23.76 kg/m²         Physical Exam   Constitutional: He is oriented to person, place, and time. He appears well-developed and well-nourished.   HENT:   Head: Normocephalic.   Eyes: Pupils are equal, round, and reactive to light.   Neck: Normal " range of motion. No JVD present. Carotid bruit is not present. No thyromegaly present.   Cardiovascular: Normal rate, regular rhythm, S1 normal, S2 normal, normal heart sounds and intact distal pulses. Exam reveals no gallop and no friction rub.   No murmur heard.  Pulmonary/Chest: Effort normal and breath sounds normal.   Abdominal: Soft. Bowel sounds are normal.   Musculoskeletal: He exhibits no edema.   Neurological: He is alert and oriented to person, place, and time.   Skin: Skin is warm, dry and intact. No erythema.   Psychiatric: He has a normal mood and affect.   Vitals reviewed.          Assessment:       Diagnosis Plan   1. Essential hypertension       Patient remains clinically stable at this time.  His metoprolol as ordered.  He will follow back up in a year       Plan:

## 2020-09-24 ENCOUNTER — CLINICAL SUPPORT (OUTPATIENT)
Dept: INTERNAL MEDICINE | Age: 70
End: 2020-09-24

## 2020-09-24 PROCEDURE — 96372 THER/PROPH/DIAG INJ SC/IM: CPT | Performed by: INTERNAL MEDICINE

## 2020-09-24 RX ADMIN — CYANOCOBALAMIN 1000 MCG: 1000 INJECTION, SOLUTION INTRAMUSCULAR; SUBCUTANEOUS at 08:49

## 2020-10-12 DIAGNOSIS — G25.81 RLS (RESTLESS LEGS SYNDROME): Chronic | ICD-10-CM

## 2020-10-13 RX ORDER — PRAMIPEXOLE DIHYDROCHLORIDE 1.5 MG/1
TABLET ORAL
Qty: 180 TABLET | Refills: 1 | Status: SHIPPED | OUTPATIENT
Start: 2020-10-13 | End: 2021-05-05

## 2020-10-13 RX ORDER — APIXABAN 5 MG/1
TABLET, FILM COATED ORAL
Qty: 60 TABLET | Refills: 2 | Status: SHIPPED | OUTPATIENT
Start: 2020-10-13 | End: 2020-12-18 | Stop reason: HOSPADM

## 2020-10-23 ENCOUNTER — CLINICAL SUPPORT (OUTPATIENT)
Dept: INTERNAL MEDICINE | Age: 70
End: 2020-10-23

## 2020-10-23 PROCEDURE — 96372 THER/PROPH/DIAG INJ SC/IM: CPT | Performed by: INTERNAL MEDICINE

## 2020-10-23 RX ADMIN — CYANOCOBALAMIN 1000 MCG: 1000 INJECTION, SOLUTION INTRAMUSCULAR; SUBCUTANEOUS at 09:19

## 2020-11-06 ENCOUNTER — TELEPHONE (OUTPATIENT)
Dept: INTERNAL MEDICINE | Age: 70
End: 2020-11-06

## 2020-11-09 NOTE — TELEPHONE ENCOUNTER
I talked with the patient and he was wanting a name and phone number of another for an orthopedic doctor. He was seen at [facility] but wants a 2nd opinion. I gave him [facility] PH # for him to call to schedule a 2nd opinion appointment.

## 2020-11-13 ENCOUNTER — TELEPHONE (OUTPATIENT)
Dept: INTERNAL MEDICINE | Age: 70
End: 2020-11-13

## 2020-11-13 DIAGNOSIS — I89.0 LYMPHEDEMA: Primary | ICD-10-CM

## 2020-11-13 NOTE — TELEPHONE ENCOUNTER
PATIENT STATES: he needs a referral for lymphedema clinic physical therapy please advise     PATIENT CAN BE REACHED ON:  [phone]

## 2020-11-23 ENCOUNTER — CLINICAL SUPPORT (OUTPATIENT)
Dept: INTERNAL MEDICINE | Age: 70
End: 2020-11-23

## 2020-11-23 DIAGNOSIS — E53.8 B12 DEFICIENCY: Chronic | ICD-10-CM

## 2020-11-23 PROCEDURE — 96372 THER/PROPH/DIAG INJ SC/IM: CPT | Performed by: INTERNAL MEDICINE

## 2020-11-23 RX ADMIN — CYANOCOBALAMIN 1000 MCG: 1000 INJECTION, SOLUTION INTRAMUSCULAR; SUBCUTANEOUS at 09:48

## 2020-11-27 DIAGNOSIS — F32.9 MAJOR DEPRESSIVE DISORDER WITH CURRENT ACTIVE EPISODE, UNSPECIFIED DEPRESSION EPISODE SEVERITY, UNSPECIFIED WHETHER RECURRENT: ICD-10-CM

## 2020-11-29 RX ORDER — PAROXETINE HYDROCHLORIDE 40 MG/1
TABLET, FILM COATED ORAL
Qty: 30 TABLET | Refills: 5 | Status: SHIPPED | OUTPATIENT
Start: 2020-11-29 | End: 2021-07-06

## 2020-12-02 ENCOUNTER — HOSPITAL ENCOUNTER (OUTPATIENT)
Dept: PHYSICAL THERAPY | Facility: HOSPITAL | Age: 70
Setting detail: THERAPIES SERIES
Discharge: HOME | End: 2020-12-02

## 2020-12-02 ENCOUNTER — OFFICE VISIT (OUTPATIENT)
Dept: INTERNAL MEDICINE | Age: 70
End: 2020-12-02

## 2020-12-02 VITALS
OXYGEN SATURATION: 98 % | WEIGHT: 226 LBS | TEMPERATURE: 97.1 F | SYSTOLIC BLOOD PRESSURE: 138 MMHG | DIASTOLIC BLOOD PRESSURE: 64 MMHG | BODY MASS INDEX: 26.68 KG/M2 | HEART RATE: 70 BPM | HEIGHT: 77 IN

## 2020-12-02 DIAGNOSIS — F33.41 RECURRENT MAJOR DEPRESSIVE DISORDER, IN PARTIAL REMISSION (HCC): Chronic | ICD-10-CM

## 2020-12-02 DIAGNOSIS — I82.432 ACUTE DEEP VEIN THROMBOSIS (DVT) OF POPLITEAL VEIN OF LEFT LOWER EXTREMITY (HCC): ICD-10-CM

## 2020-12-02 DIAGNOSIS — I10 ESSENTIAL HYPERTENSION: Chronic | ICD-10-CM

## 2020-12-02 DIAGNOSIS — I89.0 LYMPHEDEMA: Primary | ICD-10-CM

## 2020-12-02 DIAGNOSIS — Z00.00 MEDICARE ANNUAL WELLNESS VISIT, SUBSEQUENT: Primary | ICD-10-CM

## 2020-12-02 PROCEDURE — 99213 OFFICE O/P EST LOW 20 MIN: CPT | Performed by: INTERNAL MEDICINE

## 2020-12-02 PROCEDURE — 97161 PT EVAL LOW COMPLEX 20 MIN: CPT

## 2020-12-02 PROCEDURE — 96160 PT-FOCUSED HLTH RISK ASSMT: CPT | Performed by: INTERNAL MEDICINE

## 2020-12-02 PROCEDURE — 1170F FXNL STATUS ASSESSED: CPT | Performed by: INTERNAL MEDICINE

## 2020-12-02 PROCEDURE — G0439 PPPS, SUBSEQ VISIT: HCPCS | Performed by: INTERNAL MEDICINE

## 2020-12-02 PROCEDURE — 1126F AMNT PAIN NOTED NONE PRSNT: CPT | Performed by: INTERNAL MEDICINE

## 2020-12-02 RX ORDER — AMOXICILLIN 500 MG/1
CAPSULE ORAL
COMMUNITY
Start: 2020-11-23 | End: 2020-12-02

## 2020-12-02 RX ORDER — CHLORHEXIDINE GLUCONATE 0.12 MG/ML
RINSE ORAL
COMMUNITY
Start: 2020-11-23 | End: 2020-12-02

## 2020-12-02 NOTE — ASSESSMENT & PLAN NOTE
Continue metoprolol tartrate 25 mg BID.     BP Readings from Last 3 Encounters:   12/02/20 138/64   09/01/20 128/72   07/27/20 118/62

## 2020-12-02 NOTE — THERAPY EVALUATION
Physical Therapy Lymphedema Initial Evaluation  [facility]     Patient Name: Jose Hurtado  : 1950  MRN: [id number]  Today's Date: 2020      Visit Date: 2020    Visit Dx:    ICD-10-CM ICD-9-CM   1. Lymphedema  I89.0 457.1       Patient Active Problem List   Diagnosis   • Adenocarcinoma of esophagus (CMS/HCC)   • Adenomatous polyp of colon   • Malignant neoplasm of prostate (CMS/HCC)   • RLS (restless legs syndrome)   • Recurrent major depressive disorder, in partial remission (CMS/HCC)   • Hypertension   • Anemia   • Insomnia due to other mental disorder   • Peripheral polyneuropathy   • B12 deficiency   • Postsurgical malabsorption   • Lymphedema   • Iron deficiency   • Gastroparesis   • Acute deep vein thrombosis (DVT) of popliteal vein of left lower extremity (CMS/HCC)   • Tremor of both hands        Past Medical History:   Diagnosis Date   • Acute deep vein thrombosis (DVT) of popliteal vein of left lower extremity (CMS/HCC) 2020   • Anemia    • Anti-phospholipid syndrome (CMS/HCC)     On lifelong anticoagulation therapy   • Bladder disorder     LEAKAGE   ON MED  wers pads   • Community acquired pneumonia     HISTORY OF IN    • Deep venous thrombosis (CMS/HCC) ,     Left lower extremity multiple   • Depression    • Esophageal carcinoma (CMS/HCC) 2014    had chemo and radiation prior surgery   • Hemorrhoids    • HH (hiatus hernia)    • History of atrial fibrillation 2015    ONE EPISODE WHILE HOSPITALIZED   • History of kidney stones    • History of nephrolithiasis    • History of pancreatitis     PT STATES MANY YEARS AGO   • History of radiation therapy    • Hypertension    • Long-term (current) use of anticoagulants, INR goal 2.0-3.0    • Lymphedema    • Malignant neoplasm of prostate (CMS/HCC)    • Other hyperlipidemia 2018 lipid panel risk 12.8%   • Restless legs syndrome    • Shortness of breath    • Squamous carcinoma     on the head         Past Surgical History:   Procedure Laterality Date   • APPENDECTOMY  1950   • BRONCHOSCOPY      (Diagnostic)   • CATARACT EXTRACTION Bilateral 2014   • COLONOSCOPY  12/15/2014    Complete / Description: EH, IH, torts, stool, follow-up colonoscopy due in 5 years.   • COLONOSCOPY N/A 6/13/2017    non-thrombosed external hemorrhoids, normal examined ileum, IH   • COLONOSCOPY N/A 2/26/2019    Procedure: COLONOSCOPY with Cold Polypectomy;  Surgeon: Mulugeta Millan MD;  Location: [facility] ENDOSCOPY;  Service: Gastroenterology   • CYSTOSCOPY W/ LASER LITHOTRIPSY     • ENDOSCOPY N/A 6/13/2017    Procedure: ESOPHAGOGASTRODUODENOSCOPY WITH COLD BIOPSY;  Surgeon: Lake Gonzalez MD;  Location: [facility] ENDOSCOPY;  Service:    • ESOPHAGECTOMY      April 2015, stage IIB esophageal carcinoma, sub-total resection.   • ESOPHAGECTOMY      Esophagectomy Subtotal [place] [facility] Procedure   • EXCISION LESION  08/2012    Removal of Squamous Cell CA on Head   • HAMMER TOE REPAIR  09/2014    Hammertoe Operation (Each Toe), 10/2014   • HAMMER TOE REPAIR Left 10/3/2017    Procedure: Left second third and fourth distal interphalangeal joint resection with flexor tenotomy;  Surgeon: Mulugeta Lira MD;  Location: [facility] OR OSC;  Service:    • HERNIA REPAIR      incisional   • JEJUNOSTOMY      Laparoscopic   • JEJUNOSTOMY      tube removal    • KNEE SURGERY Bilateral 1967, 1973, 1981   • PATELLA SURGERY Left     removed   • PILONIDAL CYST / SINUS EXCISION     • WA TOTAL KNEE ARTHROPLASTY Right 3/26/2018    Procedure: TOTAL KNEE ARTHROPLASTY;  Surgeon: Renny Solis MD;  Location: [facility] MAIN OR;  Service: Orthopedics   • PROSTATECTOMY  2010   • PYLOROPLASTY     • SPINAL FUSION  02/1998    C 5,6   • TONSILLECTOMY  1955   • TONSILLECTOMY     • UPPER GASTROINTESTINAL ENDOSCOPY  12/15/2014    LA Grade D esophagitis, Pardo's, HH, multiple duodenal ulcers   • VENTRAL/INCISIONAL HERNIA REPAIR N/A 4/14/2016    Procedure:  VENTRAL/INCISIONAL HERNIA REPAIR, open, with mesh, and component separation;  Surgeon: Darren Rivas MD;  Location: [facility] MAIN OR;  Service:        Visit Dx:    ICD-10-CM ICD-9-CM   1. Lymphedema  I89.0 457.1       Patient History     Row Name 12/02/20 1400             History    Chief Complaint  Swelling  -PC      Date Current Problem(s) Began  -- worse last couple months  -PC      Brief Description of Current Complaint  States he wears his stockings and bandages some, but legs swelled back up, jolly the left one. Was in the hospital in Jan for pnuemonia, and in Feb for a blood clot.   -PC      Patient/Caregiver Goals  Decrease swelling;Improve mobility  -PC         Pain     Pain Location  Knee  -PC      Pain at Present  0  -PC      Pain at Best  0  -PC      Pain at Worst  5 walking  -PC      Difficulties with ADL's?  Difficulty walking, fitting into socks, shoes, and pants.  -PC         Fall Risk Assessment    Any falls in the past year:  Yes  -PC      Number of falls reported in the last 12 months  1  -PC      Factors that contributed to the fall:  Tripped  -PC         Services    Are you currently receiving Home Health services  No  -PC         Daily Activities    Primary Language  English  -PC      How does patient learn best?  Reading  -PC      Teaching needs identified  Management of Condition  -PC      Patient is concerned about/has problems with  Climbing Stairs;Difficulty with self care (i.e. bathing, dressing, toileting:;Performing home management (household chores, shopping, care of dependents);Walking  -PC      Does patient have problems with the following?  Depression  -PC      Barriers to learning  None  -PC      Functional Status  mobility issues preventing performance of daily activities  -PC      Pt Participated in POC and Goals  Yes  -PC         Safety    Are you being hurt, hit, or frightened by anyone at home or in your life?  No  -PC      Are you being neglected by a caregiver  No   -PC        User Key  (r) = Recorded By, (t) = Taken By, (c) = Cosigned By    Initials Name Provider Type    Gogo Carroll, PT Physical Therapist          Lymphedema     Row Name 12/02/20 1400             Subjective Pain    Able to rate subjective pain?  yes  -PC      Pre-Treatment Pain Level  0  -PC      Post-Treatment Pain Level  0  -PC         Lymphedema Assessment    Lymphedema Classification  RLE:;LLE:;secondary;stage 2 (Spontaneously Irreversible)  -PC      Lymphedema Surgery Comments  Prostatectomy 2010, esophagectomy 4/2015  -PC      Infections or Cellulitis?  no  -PC         Posture/Observations    Posture/Observations Comments  Pt ambulates independently into clinic with Parkinson's gait  -PC         General ROM    GENERAL ROM COMMENTS  WFL, toes and ankles limited by edema  -PC         Lymphedema Edema Assessment    Ptting Edema Category  By grade out of 4  -PC      Pitting Edema  + 3/4  -PC      Edema Assessment Comment  Mod + edema left toes to knee, mod edema right ankle to knee.  -PC         Skin Changes/Observations    Skin Observations Comment  brownish purple discoloration toes and distal dorsum of foot. Lower legs slightly red.  -PC         Lymphedema Sensation    Lymphedema Sensation Reports  numbness  -PC      Lymphedema Sensation Tests  light touch  -PC      Lymphedema Light Touch  mild impairment  -PC      Lymphedema Sensation Comments  slightly numb along medial lower leg  -PC         Lymphedema Measurements    Measurement Type(s)  Circumferential  -PC      Circumferential Areas  Lower extremities  -PC         BLE Circumferential (cm)    Measurement Location 1  Knee  -PC      Left 1  42.5 cm  -PC      Right 1  40.5 cm  -PC      Measurement Location 2  10cm below knee  -PC      Left 2  46 cm  -PC      Right 2  40.5 cm  -PC      Measurement Location 3  20cm below knee  -PC      Left 3  50.3 cm  -PC      Right 3  43.5 cm  -PC      Measurement Location 4  30cm below knee  -PC      Left 4   43.5 cm  -PC      Right 4  37.5 cm  -PC      Measurement Location 5  Ankle  -PC      Left 5  33 cm  -PC      Right 5  30.5 cm  -PC      Measurement Location 6  Midfoot  -PC      Left 6  24.5 cm  -PC      Right 6  24.5 cm  -PC      LLE Circumferential Total  239.8 cm  -PC      RLE Circumferential Total  217 cm  -PC        User Key  (r) = Recorded By, (t) = Taken By, (c) = Cosigned By    Initials Name Provider Type    Gogo Carroll, MANDA Physical Therapist                          Therapy Education  Education Details: Reviewed lymphedema treatment and plan of care.  Given: Edema management, Symptoms/condition management  Program: Reinforced  How Provided: Verbal  Provided to: Patient  Level of Understanding: Verbalized      OP Exercises     Row Name 12/02/20 1400             Subjective Pain    Able to rate subjective pain?  yes  -PC      Pre-Treatment Pain Level  0  -PC      Post-Treatment Pain Level  0  -PC        User Key  (r) = Recorded By, (t) = Taken By, (c) = Cosigned By    Initials Name Provider Type    Gogo Carroll, MANDA Physical Therapist                      PT OP Goals     Row Name 12/02/20 1400          PT Short Term Goals    STG Date to Achieve  12/16/20  -PC     STG 1  Pt demo awareness of condition and precautions for improved prevention, management, care of symptoms, and ease of transition to self-care of condition.  -PC     STG 1 Progress  New  -PC     STG 2  Pt/family independent with self-wrapping techniques of compression bandages as indicated for improved self-management of condition.  -PC     STG 2 Progress  New  -     STG 3  Pt demo decreased net edema of >/=5-10cm for decreased edema symptoms, decreased risk of infection, and improved skin care.  -PC     STG 3 Progress  New  -        Long Term Goals    LTG Date to Achieve  01/01/21  -PC     LTG 1  Pt/family independent with self care techniques for self-management of condition.  -PC     LTG 1 Progress  New  -     LTG 2  Pt demo  decreased net edema of >/=10-20cm for decreased edema symptoms, decreased risk of infection, and improved skin care.  -PC     LTG 2 Progress  New  -PC     LTG 3  Pt/family independent with compression garments as indicated for self-management of condition.  -PC     LTG 3 Progress  New  -PC        Time Calculation    PT Goal Re-Cert Due Date  03/02/21  -PC       User Key  (r) = Recorded By, (t) = Taken By, (c) = Cosigned By    Initials Name Provider Type    Gogo Carroll, PT Physical Therapist          PT Assessment/Plan     Row Name 12/02/20 1437          PT Assessment    Functional Limitations  Impaired gait;Limitation in home management;Limitations in community activities;Performance in leisure activities;Performance in self-care ADL  -PC     Impairments  Edema;Gait;Impaired lymphatic circulation  -PC     Assessment Comments  Mr Hurtado is a 70 yr old male familiar to our clinic.  He presents with mod+ edema in left leg from toes to knee, and mod edema in right leg from ankle to knee.  He was  hospitalized in Jan for pneumonia and Feb for a blood clot in his left thigh. He has a history of prostate cancer and esophageal cancer. He has been successfully treated here in the past, so should do well  -PC     Please refer to paper survey for additional self-reported information  Yes  -PC     Rehab Potential  Good  -PC     Patient/caregiver participated in establishment of treatment plan and goals  Yes  -PC     Patient would benefit from skilled therapy intervention  Yes  -PC        PT Plan    PT Frequency  5x/week  -PC     Predicted Duration of Therapy Intervention (PT)  4 weeks  -PC     Planned CPT's?  PT EVAL LOW COMPLEXITY: 96839;PT MANUAL THERAPY EA 15 MIN: 44287;PT SELF CARE/HOME MGMT/TRAIN EA 15: 03282  -PC     Physical Therapy Interventions (Optional Details)  bandaging;home exercise program;manual lymphatic drainage;patient/family education  -PC     PT Plan Comments  See POC.  -PC       User Key  (r) =  Recorded By, (t) = Taken By, (c) = Cosigned By    Initials Name Provider Type    PC Gogo Mei, PT Physical Therapist                       Time Calculation:   Start Time: 1410  Stop Time: 1440  Time Calculation (min): 30 min   Therapy Charges for Today     Code Description Service Date Service Provider Modifiers Qty    [id number]  PT EVAL LOW COMPLEXITY 2 12/2/2020 Gogo Mei, PT GP 1                    Gogo Mei, PT  12/2/2020

## 2020-12-02 NOTE — PROGRESS NOTES
"12/02/2020      MEDICARE ANNUAL WELLNESS VISIT     Jose Hurtado is a 70 y.o. male who presents for Medicare Wellness-subsequent (1/30/18)      Patient's general assessment of his health since a year ago:     - Compared to one year ago, he feels his physical health is slightly worse.    - Compared to one year ago, he feels his mental health is about the same without significant change.      HPI for other active medical problems:     Chronic essential hypertension:  Since prior visit: compliant with medication(s) and denies significant problems with medication(s).  Most recent in-office blood pressure readings:   BP Readings from Last 3 Encounters:   12/02/20 138/64   09/01/20 128/72   07/27/20 118/62     DVT of upper extremity on Eliquis without significant bleeding complications.     Depression remains stable on SSRI therapy.       * The required components of Health Risk Assessment (HRA) that were completed by the patient and/or my staff are contained within this note and in the scanned documents titled \"Health Risk Assessment\" within the media section of the patient's chart in [facility].       HISTORY    Recent Hospitalizations:    Recent hospitalization?:     If YES, location, date, and diagnoses:     · Location:   · Date:   · Principle Discharge Dx:   · Secondary Dx:       Patient Care Team:    Patient Care Team:  Brandin Bolton MD as PCP - General (Internal Medicine)  Tapan, George THORPE II, MD as Consulting Physician (Hematology and Oncology)  Jose Inman MD as Consulting Physician (Urology)  Mulugeta Millan MD as Consulting Physician (Gastroenterology)  Seth Randhawa MD as Consulting Physician (Ophthalmology)      Allergies:  Patient has no known allergies.    Medications:  Current Outpatient Medications   Medication Sig Dispense Refill   • albuterol sulfate  (90 Base) MCG/ACT inhaler Inhale 1 puff Every 4 (Four) Hours As Needed for Wheezing.     • Eliquis 5 MG tablet tablet TAKE ONE TABLET BY " MOUTH EVERY 12 HOURS 60 tablet 2   • furosemide (LASIX) 20 MG tablet TAKE ONE TABLET BY MOUTH DAILY 30 tablet 11   • metoprolol tartrate (LOPRESSOR) 25 MG tablet Take 1 tablet by mouth 2 (Two) Times a Day. 180 tablet 3   • pantoprazole (PROTONIX) 40 MG EC tablet Take 1 tablet by mouth 2 (Two) Times a Day Before Meals. 180 tablet 3   • PARoxetine (PAXIL) 40 MG tablet TAKE ONE TABLET BY MOUTH EVERY MORNING 30 tablet 5   • potassium chloride (MICRO-K) 10 MEQ CR capsule Take 1 capsule by mouth Daily. 30 capsule 3   • pramipexole (MIRAPEX) 1.5 MG tablet TAKE ONE TABLET BY MOUTH TWICE A  tablet 1   • STIOLTO RESPIMAT 2.5-2.5 MCG/ACT aerosol solution inhaler Inhale 2 puffs Daily. (Patient taking differently: Inhale 2 puffs Daily. prn)       Current Facility-Administered Medications   Medication Dose Route Frequency Provider Last Rate Last Admin   • cyanocobalamin injection 1,000 mcg  1,000 mcg Intramuscular Q28 Days Brandin Bolton MD   1,000 mcg at 11/23/20 0948        PFSH:     The following portions of the patient's history were reviewed and updated as appropriate: Allergies / Current Medications / Past Medical History / Surgical History / Social History / Family History    Problem List:  Patient Active Problem List   Diagnosis   • Adenocarcinoma of esophagus (CMS/HCC)   • Adenomatous polyp of colon   • Malignant neoplasm of prostate (CMS/HCC)   • RLS (restless legs syndrome)   • Recurrent major depressive disorder, in partial remission (CMS/HCC)   • Hypertension   • Anemia   • Insomnia due to other mental disorder   • Peripheral polyneuropathy   • B12 deficiency   • Postsurgical malabsorption   • Lymphedema   • Iron deficiency   • Gastroparesis   • Acute deep vein thrombosis (DVT) of popliteal vein of left lower extremity (CMS/HCC)   • Tremor of both hands       Past Medical History:  Past Medical History:   Diagnosis Date   • Acute deep vein thrombosis (DVT) of popliteal vein of left lower extremity (CMS/HCC)  03/24/2020   • Anemia    • Anti-phospholipid syndrome (CMS/HCC)     On lifelong anticoagulation therapy   • Bladder disorder     LEAKAGE   ON MED  wers pads   • Community acquired pneumonia     HISTORY OF IN 2014   • Deep venous thrombosis (CMS/HCC) 2006, 2008    Left lower extremity multiple   • Depression    • Esophageal carcinoma (CMS/HCC) 12/31/2014    had chemo and radiation prior surgery   • Hemorrhoids    • HH (hiatus hernia)    • History of atrial fibrillation 2015    ONE EPISODE WHILE HOSPITALIZED   • History of kidney stones    • History of nephrolithiasis    • History of pancreatitis     PT STATES MANY YEARS AGO   • History of radiation therapy    • Hypertension    • Long-term (current) use of anticoagulants, INR goal 2.0-3.0    • Lymphedema    • Malignant neoplasm of prostate (CMS/HCC)    • Other hyperlipidemia 1/30/2018 January 30, 2018 lipid panel risk 12.8%   • Restless legs syndrome    • Shortness of breath    • Squamous carcinoma     on the head       Past Surgical History:  Past Surgical History:   Procedure Laterality Date   • APPENDECTOMY  1950   • BRONCHOSCOPY      (Diagnostic)   • CATARACT EXTRACTION Bilateral 2014   • COLONOSCOPY  12/15/2014    Complete / Description: EH, IH, torts, stool, follow-up colonoscopy due in 5 years.   • COLONOSCOPY N/A 6/13/2017    non-thrombosed external hemorrhoids, normal examined ileum, IH   • COLONOSCOPY N/A 2/26/2019    Procedure: COLONOSCOPY with Cold Polypectomy;  Surgeon: Mulugeta Millan MD;  Location: [facility] ENDOSCOPY;  Service: Gastroenterology   • CYSTOSCOPY W/ LASER LITHOTRIPSY     • ENDOSCOPY N/A 6/13/2017    Procedure: ESOPHAGOGASTRODUODENOSCOPY WITH COLD BIOPSY;  Surgeon: Lake Gonzalez MD;  Location: [facility] ENDOSCOPY;  Service:    • ESOPHAGECTOMY      April 2015, stage IIB esophageal carcinoma, sub-total resection.   • ESOPHAGECTOMY      Esophagectomy Subtotal [place] [facility] Procedure   • EXCISION LESION  08/2012    Removal of Squamous Cell  CA on Head   • HAMMER TOE REPAIR  2014    Hammertoe Operation (Each Toe), 10/2014   • HAMMER TOE REPAIR Left 10/3/2017    Procedure: Left second third and fourth distal interphalangeal joint resection with flexor tenotomy;  Surgeon: Mulugeta Lira MD;  [facility];  Service:    • HERNIA REPAIR      incisional   • JEJUNOSTOMY      Laparoscopic   • JEJUNOSTOMY      tube removal    • KNEE SURGERY Bilateral , ,    • PATELLA SURGERY Left     removed   • PILONIDAL CYST / SINUS EXCISION     • DE TOTAL KNEE ARTHROPLASTY Right 3/26/2018    Procedure: TOTAL KNEE ARTHROPLASTY;  Surgeon: Renny Solis MD;  Location: [facility];  Service: Orthopedics   • PROSTATECTOMY     • PYLOROPLASTY     • SPINAL FUSION  1998    C 5,6   • TONSILLECTOMY     • TONSILLECTOMY     • UPPER GASTROINTESTINAL ENDOSCOPY  12/15/2014    LA Grade D esophagitis, Pardo's, HH, multiple duodenal ulcers   • VENTRAL/INCISIONAL HERNIA REPAIR N/A 2016    Procedure: VENTRAL/INCISIONAL HERNIA REPAIR, open, with mesh, and component separation;  Surgeon: Darren Rivas MD;  Location: [facility];  Service:        Social History:  Social History     Socioeconomic History   • Marital status:      Spouse name: Lena   • Number of children: 0   • Years of education: College   • Highest education level: Not on file   Occupational History   • Occupation: Retired      Comment: Retired    Tobacco Use   • Smoking status: Former Smoker     Packs/day: 2.00     Years: 3.00     Pack years: 6.00     Types: Cigarettes     Quit date: 1974     Years since quittin.9   • Smokeless tobacco: Never Used   • Tobacco comment: no caffeine    Substance and Sexual Activity   • Alcohol use: Yes     Frequency: 2-3 times a week     Comment: 2-3 x per week   • Drug use: No   • Sexual activity: Defer   Social History Narrative    self-employed        Family History:  Family History  "  Problem Relation Age of Onset   • Cerebral aneurysm Mother         cerebral artery aneurysm ( age 56)   • Prostate cancer Brother 68   • Anxiety disorder Father    • Suicide Attempts Father          of suicide   • Cancer Father         bladder   • No Known Problems Brother    • Pancreatic cancer Nephew    • Colon cancer Neg Hx    • Esophageal cancer Neg Hx    • Dementia Neg Hx          PATIENT ASSESSMENT    Vitals:  /64   Pulse 70   Temp 97.1 °F (36.2 °C)   Ht 195.6 cm (77\")   Wt 103 kg (226 lb)   SpO2 98%   BMI 26.80 kg/m²   BP Readings from Last 3 Encounters:   20 138/64   20 128/72   20 118/62     Wt Readings from Last 3 Encounters:   20 103 kg (226 lb)   20 90.9 kg (200 lb 6.4 oz)   20 92 kg (202 lb 12.8 oz)      Body mass index is 26.8 kg/m².    There were no vitals filed for this visit.      Review of Systems:    Review of Systems  Constitutional neg except per HPI   Resp neg  CV neg   GI neg for bleeding/melena   cath's self, history of prostate cancer s/p prostatectomy  Psych stable depression   MuSk arthritis of knees     Physical Exam:    Physical Exam  No acute distress   Cardiovascular: Normal rate, regular rhythm.  Pulm/Chest: Effort normal, breath sounds normal.   MuSk bilateral knees with arthritis     Reviewed Data:    Labs:   Lab Results   Component Value Date     2020    K 4.6 2020    CALCIUM 9.2 2020    AST 24 2020    ALT 15 2020    BUN 20 2020    CREATININE 1.22 2020    CREATININE 1.20 2020    CREATININE 0.97 2020    EGFRIFNONA 58 (L) 2020    EGFRIFAFRI 97 05/10/2018       Lab Results   Component Value Date     (H) 05/10/2018    GLU 91 2018    HGBA1C 6.20 (H) 2020       Lab Results   Component Value Date    LDL 92 05/10/2018     (H) 2018    LDL 98 2017    HDL 45 05/10/2018    TRIG 79 05/10/2018       Lab Results   Component Value Date "    TSH 2.710 01/17/2020    FREET4 1.52 01/17/2020          Lab Results   Component Value Date    WBC 4.33 07/07/2020    HGB 11.7 (L) 07/07/2020    HGB 9.9 (L) 05/05/2020    HGB 8.3 (L) 03/27/2020     07/07/2020                 No results found for: PSA    Imaging:          Medical Tests:          Screening for Glaucoma:  Previous screening for glaucoma?: Yes      Hearing Loss Screen:  Finger Rub Hearing Test (right ear): borderline and uses hearing aid  Finger Rub Hearing Test (left ear): borderline and uses hearing aid      Urinary Incontinence Screen:  Episodes of urinary incontinence? : Yes; self catheterization       Depression Screen:  PHQ-2/PHQ-9 Depression Screening 12/2/2020   Little interest or pleasure in doing things 0   Feeling down, depressed, or hopeless 0   Trouble falling or staying asleep, or sleeping too much -   Feeling tired or having little energy -   Poor appetite or overeating -   Feeling bad about yourself - or that you are a failure or have let yourself or your family down -   Trouble concentrating on things, such as reading the newspaper or watching television -   Moving or speaking so slowly that other people could have noticed. Or the opposite - being so fidgety or restless that you have been moving around a lot more than usual -   Thoughts that you would be better off dead, or of hurting yourself in some way -   Total Score 0   If you checked off any problems, how difficult have these problems made it for you to do your work, take care of things at home, or get along with other people? -        PHQ-2: on treatment for depression    PHQ-9: 0        FUNCTIONAL, FALL RISK, & COGNITIVE SCREENING (Components below):    DATA:    Functional & Cognitive Status 12/2/2020   Do you have difficulty preparing food and eating? No   Do you have difficulty bathing yourself, getting dressed or grooming yourself? No   Do you have difficulty using the toilet? No   Do you have difficulty moving  around from place to place? No   Do you have trouble with steps or getting out of a bed or a chair? No   Current Diet Well Balanced Diet   Dental Exam Up to date   Eye Exam Up to date   Exercise (times per week) 3 times per week   Current Exercise Activities Include Aerobics   Do you need help using the phone?  No   Are you deaf or do you have serious difficulty hearing?  Yes   Do you need help with transportation? No   Do you need help shopping? No   Do you need help preparing meals?  No   Do you need help with housework?  No   Do you need help with laundry? No   Do you need help taking your medications? No   Do you need help managing money? No   Do you ever drive or ride in a car without wearing a seat belt? No   Have you felt unusual stress, anger or loneliness in the last month? -   Who do you live with? -   If you need help, do you have trouble finding someone available to you? -   Do you have difficulty concentrating, remembering or making decisions? No         A) Assessment of Functional Ability:  (Assessment of ability to perform ADL's (showering/bathing, using toilet, dressing, feeding self, moving self around) and IADL's (use telephone, shop, prepare food, housekeep, do laundry, transport independently, take medications independently, and handle finances)    Degree of functional impairment: MODERATE (based on assessment noted above)      B) Assessment of Fall Risk:  Fall Risk Assessment was completed, and patient is at high risk for falls.       Need for further evaluation of gait, strength, and balance? : Yes    Timed Up and Go (TUG):   (>= 12 seconds indicates high risk for falling)    Observable abnormalities included: slow tentative pace       C. Assessment of Cognitive Function:    Mini-Cog Test:     1) Registration (3 objects): Yes   2) Number of objects recalled: 3   3) Clock Draw: Passed? : N/A       Further evaluation required? : No      COUNSELING    A. Identification of Health Risk  Factors:    Risk factors include: cardiovascular risk factors, increased fall risk, chronic pain, poor hearing and incontinence      B. Age-Appropriate Screening Schedule:  (Refer to the list below for future screening recommendations based on patient's age, sex and/or medical conditions. Orders for these recommended tests are listed in the plan section. The patient has been provided with a written plan)    Health Maintenance Topics  Health Maintenance   Topic Date Due   • LIPID PANEL  05/10/2019   • ZOSTER VACCINE (2 of 3) 12/07/2019   • COLONOSCOPY  02/26/2022   • TDAP/TD VACCINES (3 - Td) 04/12/2028   • INFLUENZA VACCINE  Completed       Health Maintenance Topics Due or Over-Due  Health Maintenance Due   Topic Date Due   • LIPID PANEL  05/10/2019   • ZOSTER VACCINE (2 of 3) 12/07/2019         C. Advanced Care Planning:    Advance Care Planning   ACP discussion was held with the patient during this visit. Patient has an advance directive in EMR which is still valid.        D. Patient Self-Management and Personalized Health Advice:    He has been provided with personalized counseling/information (including brochures/handouts) about:     -- reducing risk for cardiovascular disease (heart, stroke, vascular), fall prevention, mental health concerns (depression/anxiety), managing depression/anxiety, polypharmacy concerns, side effects of medications and management of chronic pain with focus on minimizing use of narcotic pain medications      He has been recommended for the following preventative services which has been performed today, will be ordered today or ordered/performed on upcoming follow-up visit:     -- nutrition counseling provided, exercise counseling provided, counseling for cardiovascular disease risk reduction, fall risk assessment / plan of care completed, urinary incontinence assessment done, screening for diabetes performed (current/reviewed labs/lab ordered), has hx of prostate cancer and is  monitored by urologist/here with annual PSA      E. Miscellaneous Items:    -Aspirin use counseling: Does not need ASA (and currently is not on it)    -Discussed BMI with him. The BMI is above average; BMI management plan is completed (discussed plans for weight loss)    -Reviewed use of high risk medication in the elderly: YES    -Reviewed for potential of harmful drug interactions in the elderly: YES      IV. WRAP-UP    Assessment & Plan:    1) MEDICARE ANNUAL WELLNESS VISIT    2) OTHER MEDICAL CONDITIONS ADDRESSED TODAY:            Problem List Items Addressed This Visit        High    Hypertension - Primary (Chronic)    Relevant Medications    metoprolol tartrate (LOPRESSOR) 25 MG tablet    furosemide (LASIX) 20 MG tablet       Low    Acute deep vein thrombosis (DVT) of popliteal vein of left lower extremity (CMS/HCC)                  No orders of the defined types were placed in this encounter.      Discussion / Summary:        Medications as of TODAY:              Current Outpatient Medications   Medication Sig Dispense Refill   • albuterol sulfate  (90 Base) MCG/ACT inhaler Inhale 1 puff Every 4 (Four) Hours As Needed for Wheezing.     • Eliquis 5 MG tablet tablet TAKE ONE TABLET BY MOUTH EVERY 12 HOURS 60 tablet 2   • furosemide (LASIX) 20 MG tablet TAKE ONE TABLET BY MOUTH DAILY 30 tablet 11   • metoprolol tartrate (LOPRESSOR) 25 MG tablet Take 1 tablet by mouth 2 (Two) Times a Day. 180 tablet 3   • pantoprazole (PROTONIX) 40 MG EC tablet Take 1 tablet by mouth 2 (Two) Times a Day Before Meals. 180 tablet 3   • PARoxetine (PAXIL) 40 MG tablet TAKE ONE TABLET BY MOUTH EVERY MORNING 30 tablet 5   • potassium chloride (MICRO-K) 10 MEQ CR capsule Take 1 capsule by mouth Daily. 30 capsule 3   • pramipexole (MIRAPEX) 1.5 MG tablet TAKE ONE TABLET BY MOUTH TWICE A  tablet 1   • STIOLTO RESPIMAT 2.5-2.5 MCG/ACT aerosol solution inhaler Inhale 2 puffs Daily. (Patient taking differently: Inhale 2 puffs  Daily. prn)       Current Facility-Administered Medications   Medication Dose Route Frequency Provider Last Rate Last Admin   • cyanocobalamin injection 1,000 mcg  1,000 mcg Intramuscular Q28 Days Brandin Bolton MD   1,000 mcg at 11/23/20 0948         FOLLOW-UP:            Return in about 5 months (around 5/2/2021) for Reassess chronic medical problems.                 Future Appointments   Date Time Provider Department Center   12/2/2020  2:15 PM Gogo Mei, PT [facility]   12/23/2020 10:30 AM MA [facility]   12/29/2020  9:10 AM LAB CHAIR [facility]   1/5/2021  1:00 PM VITALS ONLY [facility]   1/5/2021  1:20 PM George Phelan II, MD [facility]   3/17/2021 10:30 AM [facility] CT 3 [facility] CT [facility]   3/23/2021  9:30 AM Jose Christine III, MD [facility] TS [facility] None   5/19/2021  9:45 AM Brandin Bolton MD [facility]   9/1/2021  9:45 AM James Cyr MD [facility] CD [facility] None         ______________________________________________________________________      After Visit Summary (AVS) including the Personalized Prevention  Plan Services (PPPS) was either printed and given to the patient at check-out today and/or sent to [facility] for review.       Examiner was wearing KN95 mask, face shield and exam gloves during the entire duration of the visit. Patient was masked the entire time.   Minimum social distance of 6 ft maintained entire visit except if physical contact was necessary as documented.     **Dragon Disclaimer:   Much of this encounter note is an electronic transcription/translation of spoken language to printed text. The electronic translation of spoken language may permit erroneous, or at times, nonsensical words or phrases to be inadvertently transcribed. Although I have reviewed the note for such errors, some may still exist.     This template was created by Moose Bolton MD.

## 2020-12-14 ENCOUNTER — TELEPHONE (OUTPATIENT)
Dept: INTERNAL MEDICINE | Age: 70
End: 2020-12-14

## 2020-12-14 NOTE — TELEPHONE ENCOUNTER
Patient states he has been experiencing diarrhea fr a couple days now and would like some advice on what to do.    Please call patient at [phone].

## 2020-12-14 NOTE — TELEPHONE ENCOUNTER
"Pt states he has been having loose bowels for a few days with bright red blood in stool. Denies abdominal pain and cramping, no fever. Says he has only eaten red meat \" a little bit\" past few days. Says he is also straining when he has to go and is unsure if it is a hemorrhoid or different issue.     Says he does not want to be seen, asking if he can have advice of what to do instead.  "

## 2020-12-14 NOTE — TELEPHONE ENCOUNTER
Monitor symptoms since it's mild. If persistent / worse needs to be evaluated.   Monitor diet and avoid triggering food items.

## 2020-12-15 ENCOUNTER — APPOINTMENT (OUTPATIENT)
Dept: GENERAL RADIOLOGY | Facility: HOSPITAL | Age: 70
End: 2020-12-15

## 2020-12-15 ENCOUNTER — TELEPHONE (OUTPATIENT)
Dept: INTERNAL MEDICINE | Age: 70
End: 2020-12-15

## 2020-12-15 ENCOUNTER — HOSPITAL ENCOUNTER (INPATIENT)
Facility: HOSPITAL | Age: 70
LOS: 3 days | Discharge: HOME | End: 2020-12-18
Attending: EMERGENCY MEDICINE | Admitting: HOSPITALIST

## 2020-12-15 DIAGNOSIS — D64.9 ANEMIA, UNSPECIFIED TYPE: ICD-10-CM

## 2020-12-15 DIAGNOSIS — K92.2 GASTROINTESTINAL HEMORRHAGE, UNSPECIFIED GASTROINTESTINAL HEMORRHAGE TYPE: Primary | ICD-10-CM

## 2020-12-15 DIAGNOSIS — E53.8 B12 DEFICIENCY: ICD-10-CM

## 2020-12-15 DIAGNOSIS — R77.8 ELEVATED TROPONIN: ICD-10-CM

## 2020-12-15 LAB
ABO GROUP BLD: NORMAL
ALBUMIN SERPL-MCNC: 3.5 G/DL (ref 3.5–5.2)
ALBUMIN/GLOB SERPL: 1.4 G/DL
ALP SERPL-CCNC: 89 U/L (ref 39–117)
ALT SERPL W P-5'-P-CCNC: 12 U/L (ref 1–41)
ANION GAP SERPL CALCULATED.3IONS-SCNC: 7 MMOL/L (ref 5–15)
AST SERPL-CCNC: 22 U/L (ref 1–40)
B PARAPERT DNA SPEC QL NAA+PROBE: NOT DETECTED
B PERT DNA SPEC QL NAA+PROBE: NOT DETECTED
BASOPHILS # BLD AUTO: 0.03 10*3/MM3 (ref 0–0.2)
BASOPHILS NFR BLD AUTO: 0.9 % (ref 0–1.5)
BILIRUB SERPL-MCNC: 0.2 MG/DL (ref 0–1.2)
BLD GP AB SCN SERPL QL: NEGATIVE
BUN SERPL-MCNC: 19 MG/DL (ref 8–23)
BUN/CREAT SERPL: 16 (ref 7–25)
C PNEUM DNA NPH QL NAA+NON-PROBE: NOT DETECTED
CALCIUM SPEC-SCNC: 8.4 MG/DL (ref 8.6–10.5)
CHLORIDE SERPL-SCNC: 112 MMOL/L (ref 98–107)
CO2 SERPL-SCNC: 22 MMOL/L (ref 22–29)
CREAT SERPL-MCNC: 1.19 MG/DL (ref 0.76–1.27)
D-LACTATE SERPL-SCNC: 1 MMOL/L (ref 0.5–2)
DEPRECATED RDW RBC AUTO: 46.5 FL (ref 37–54)
EOSINOPHIL # BLD AUTO: 0.21 10*3/MM3 (ref 0–0.4)
EOSINOPHIL NFR BLD AUTO: 6.6 % (ref 0.3–6.2)
ERYTHROCYTE [DISTWIDTH] IN BLOOD BY AUTOMATED COUNT: 13.8 % (ref 12.3–15.4)
EXPIRATION DATE: ABNORMAL
FECAL OCCULT BLOOD SCREEN, POC: POSITIVE
FLUAV SUBTYP SPEC NAA+PROBE: NOT DETECTED
FLUBV RNA ISLT QL NAA+PROBE: NOT DETECTED
GFR SERPL CREATININE-BSD FRML MDRD: 60 ML/MIN/1.73
GLOBULIN UR ELPH-MCNC: 2.5 GM/DL
GLUCOSE SERPL-MCNC: 122 MG/DL (ref 65–99)
HADV DNA SPEC NAA+PROBE: NOT DETECTED
HCOV 229E RNA SPEC QL NAA+PROBE: NOT DETECTED
HCOV HKU1 RNA SPEC QL NAA+PROBE: NOT DETECTED
HCOV NL63 RNA SPEC QL NAA+PROBE: NOT DETECTED
HCOV OC43 RNA SPEC QL NAA+PROBE: NOT DETECTED
HCT VFR BLD AUTO: 20.5 % (ref 37.5–51)
HCT VFR BLD AUTO: 21 % (ref 37.5–51)
HCT VFR BLD AUTO: 22.9 % (ref 37.5–51)
HGB BLD-MCNC: 6.6 G/DL (ref 13–17.7)
HGB BLD-MCNC: 7.1 G/DL (ref 13–17.7)
HGB BLD-MCNC: 7.3 G/DL (ref 13–17.7)
HMPV RNA NPH QL NAA+NON-PROBE: NOT DETECTED
HOLD SPECIMEN: NORMAL
HOLD SPECIMEN: NORMAL
HPIV1 RNA SPEC QL NAA+PROBE: NOT DETECTED
HPIV2 RNA SPEC QL NAA+PROBE: NOT DETECTED
HPIV3 RNA NPH QL NAA+PROBE: NOT DETECTED
HPIV4 P GENE NPH QL NAA+PROBE: NOT DETECTED
IMM GRANULOCYTES # BLD AUTO: 0.01 10*3/MM3 (ref 0–0.05)
IMM GRANULOCYTES NFR BLD AUTO: 0.3 % (ref 0–0.5)
LYMPHOCYTES # BLD AUTO: 0.84 10*3/MM3 (ref 0.7–3.1)
LYMPHOCYTES NFR BLD AUTO: 26.5 % (ref 19.6–45.3)
Lab: 148
M PNEUMO IGG SER IA-ACNC: NOT DETECTED
MCH RBC QN AUTO: 30 PG (ref 26.6–33)
MCHC RBC AUTO-ENTMCNC: 32.2 G/DL (ref 31.5–35.7)
MCV RBC AUTO: 93.2 FL (ref 79–97)
MONOCYTES # BLD AUTO: 0.3 10*3/MM3 (ref 0.1–0.9)
MONOCYTES NFR BLD AUTO: 9.5 % (ref 5–12)
NEGATIVE CONTROL: NEGATIVE
NEUTROPHILS NFR BLD AUTO: 1.78 10*3/MM3 (ref 1.7–7)
NEUTROPHILS NFR BLD AUTO: 56.2 % (ref 42.7–76)
NRBC BLD AUTO-RTO: 0.3 /100 WBC (ref 0–0.2)
PLATELET # BLD AUTO: 163 10*3/MM3 (ref 140–450)
PMV BLD AUTO: 9.2 FL (ref 6–12)
POSITIVE CONTROL: POSITIVE
POTASSIUM SERPL-SCNC: 4.1 MMOL/L (ref 3.5–5.2)
PROT SERPL-MCNC: 6 G/DL (ref 6–8.5)
QT INTERVAL: 456 MS
RBC # BLD AUTO: 2.2 10*6/MM3 (ref 4.14–5.8)
RH BLD: POSITIVE
RHINOVIRUS RNA SPEC NAA+PROBE: NOT DETECTED
RSV RNA NPH QL NAA+NON-PROBE: NOT DETECTED
SARS-COV-2 RNA NPH QL NAA+NON-PROBE: NOT DETECTED
SODIUM SERPL-SCNC: 141 MMOL/L (ref 136–145)
T&S EXPIRATION DATE: NORMAL
TROPONIN T SERPL-MCNC: 0.05 NG/ML (ref 0–0.03)
WBC # BLD AUTO: 3.17 10*3/MM3 (ref 3.4–10.8)
WHOLE BLOOD HOLD SPECIMEN: NORMAL
WHOLE BLOOD HOLD SPECIMEN: NORMAL

## 2020-12-15 PROCEDURE — 85014 HEMATOCRIT: CPT | Performed by: INTERNAL MEDICINE

## 2020-12-15 PROCEDURE — 86900 BLOOD TYPING SEROLOGIC ABO: CPT

## 2020-12-15 PROCEDURE — 71045 X-RAY EXAM CHEST 1 VIEW: CPT

## 2020-12-15 PROCEDURE — 85025 COMPLETE CBC W/AUTO DIFF WBC: CPT | Performed by: NURSE PRACTITIONER

## 2020-12-15 PROCEDURE — 36430 TRANSFUSION BLD/BLD COMPNT: CPT

## 2020-12-15 PROCEDURE — 85018 HEMOGLOBIN: CPT | Performed by: INTERNAL MEDICINE

## 2020-12-15 PROCEDURE — 99285 EMERGENCY DEPT VISIT HI MDM: CPT

## 2020-12-15 PROCEDURE — 86923 COMPATIBILITY TEST ELECTRIC: CPT

## 2020-12-15 PROCEDURE — 99222 1ST HOSP IP/OBS MODERATE 55: CPT | Performed by: INTERNAL MEDICINE

## 2020-12-15 PROCEDURE — 82270 OCCULT BLOOD FECES: CPT | Performed by: NURSE PRACTITIONER

## 2020-12-15 PROCEDURE — 83605 ASSAY OF LACTIC ACID: CPT | Performed by: NURSE PRACTITIONER

## 2020-12-15 PROCEDURE — 86850 RBC ANTIBODY SCREEN: CPT | Performed by: NURSE PRACTITIONER

## 2020-12-15 PROCEDURE — 86900 BLOOD TYPING SEROLOGIC ABO: CPT | Performed by: NURSE PRACTITIONER

## 2020-12-15 PROCEDURE — 83540 ASSAY OF IRON: CPT | Performed by: INTERNAL MEDICINE

## 2020-12-15 PROCEDURE — 84466 ASSAY OF TRANSFERRIN: CPT | Performed by: INTERNAL MEDICINE

## 2020-12-15 PROCEDURE — 36415 COLL VENOUS BLD VENIPUNCTURE: CPT | Performed by: INTERNAL MEDICINE

## 2020-12-15 PROCEDURE — 82728 ASSAY OF FERRITIN: CPT | Performed by: INTERNAL MEDICINE

## 2020-12-15 PROCEDURE — 80053 COMPREHEN METABOLIC PANEL: CPT | Performed by: NURSE PRACTITIONER

## 2020-12-15 PROCEDURE — 93005 ELECTROCARDIOGRAM TRACING: CPT | Performed by: NURSE PRACTITIONER

## 2020-12-15 PROCEDURE — 0202U NFCT DS 22 TRGT SARS-COV-2: CPT | Performed by: NURSE PRACTITIONER

## 2020-12-15 PROCEDURE — 93010 ELECTROCARDIOGRAM REPORT: CPT | Performed by: INTERNAL MEDICINE

## 2020-12-15 PROCEDURE — 86901 BLOOD TYPING SEROLOGIC RH(D): CPT | Performed by: NURSE PRACTITIONER

## 2020-12-15 PROCEDURE — 84484 ASSAY OF TROPONIN QUANT: CPT | Performed by: NURSE PRACTITIONER

## 2020-12-15 PROCEDURE — P9016 RBC LEUKOCYTES REDUCED: HCPCS

## 2020-12-15 RX ORDER — SODIUM CHLORIDE 9 MG/ML
150 INJECTION, SOLUTION INTRAVENOUS CONTINUOUS
Status: DISCONTINUED | OUTPATIENT
Start: 2020-12-15 | End: 2020-12-16 | Stop reason: HOSPADM

## 2020-12-15 RX ORDER — ACETAMINOPHEN 325 MG/1
650 TABLET ORAL EVERY 4 HOURS PRN
Status: DISCONTINUED | OUTPATIENT
Start: 2020-12-15 | End: 2020-12-16 | Stop reason: HOSPADM

## 2020-12-15 RX ORDER — ONDANSETRON 2 MG/ML
4 INJECTION INTRAMUSCULAR; INTRAVENOUS EVERY 6 HOURS PRN
Status: DISCONTINUED | OUTPATIENT
Start: 2020-12-15 | End: 2020-12-16 | Stop reason: HOSPADM

## 2020-12-15 RX ORDER — ALBUTEROL SULFATE 2.5 MG/3ML
2.5 SOLUTION RESPIRATORY (INHALATION) EVERY 6 HOURS PRN
Status: DISCONTINUED | OUTPATIENT
Start: 2020-12-15 | End: 2020-12-16 | Stop reason: HOSPADM

## 2020-12-15 RX ORDER — NITROGLYCERIN 0.4 MG/1
0.4 TABLET SUBLINGUAL
Status: DISCONTINUED | OUTPATIENT
Start: 2020-12-15 | End: 2020-12-16 | Stop reason: HOSPADM

## 2020-12-15 RX ORDER — POTASSIUM CHLORIDE 750 MG/1
10 CAPSULE, EXTENDED RELEASE ORAL DAILY
Status: DISCONTINUED | OUTPATIENT
Start: 2020-12-16 | End: 2020-12-16 | Stop reason: HOSPADM

## 2020-12-15 RX ORDER — PAROXETINE HYDROCHLORIDE 20 MG/1
40 TABLET, FILM COATED ORAL EVERY MORNING
Status: DISCONTINUED | OUTPATIENT
Start: 2020-12-16 | End: 2020-12-16 | Stop reason: HOSPADM

## 2020-12-15 RX ORDER — PRAMIPEXOLE DIHYDROCHLORIDE 1.5 MG/1
1.5 TABLET ORAL 2 TIMES DAILY
Status: DISCONTINUED | OUTPATIENT
Start: 2020-12-15 | End: 2020-12-16 | Stop reason: HOSPADM

## 2020-12-15 RX ORDER — FUROSEMIDE 20 MG/1
20 TABLET ORAL DAILY
Status: DISCONTINUED | OUTPATIENT
Start: 2020-12-16 | End: 2020-12-16 | Stop reason: HOSPADM

## 2020-12-15 RX ORDER — PANTOPRAZOLE SODIUM 40 MG/1
40 TABLET, DELAYED RELEASE ORAL
Status: DISCONTINUED | OUTPATIENT
Start: 2020-12-16 | End: 2020-12-16 | Stop reason: HOSPADM

## 2020-12-15 RX ORDER — POLYETHYLENE GLYCOL 3350 17 G/17G
1 POWDER, FOR SOLUTION ORAL ONCE
Status: COMPLETED | OUTPATIENT
Start: 2020-12-15 | End: 2020-12-15

## 2020-12-15 RX ORDER — UREA 10 %
3 LOTION (ML) TOPICAL NIGHTLY PRN
Status: DISCONTINUED | OUTPATIENT
Start: 2020-12-15 | End: 2020-12-16 | Stop reason: HOSPADM

## 2020-12-15 RX ORDER — ONDANSETRON 4 MG/1
4 TABLET, FILM COATED ORAL EVERY 6 HOURS PRN
Status: DISCONTINUED | OUTPATIENT
Start: 2020-12-15 | End: 2020-12-16 | Stop reason: HOSPADM

## 2020-12-15 RX ORDER — BISACODYL 5 MG/1
10 TABLET, DELAYED RELEASE ORAL ONCE
Status: COMPLETED | OUTPATIENT
Start: 2020-12-15 | End: 2020-12-15

## 2020-12-15 RX ORDER — SODIUM CHLORIDE 0.9 % (FLUSH) 0.9 %
10 SYRINGE (ML) INJECTION AS NEEDED
Status: DISCONTINUED | OUTPATIENT
Start: 2020-12-15 | End: 2020-12-16 | Stop reason: HOSPADM

## 2020-12-15 RX ADMIN — BISACODYL 10 MG: 5 TABLET ORAL at 18:12

## 2020-12-15 RX ADMIN — PRAMIPEXOLE DIHYDROCHLORIDE 1.5 MG: 1.5 TABLET ORAL at 23:37

## 2020-12-15 RX ADMIN — SODIUM CHLORIDE 150 ML/HR: 9 INJECTION, SOLUTION INTRAVENOUS at 22:47

## 2020-12-15 RX ADMIN — METOPROLOL TARTRATE 25 MG: 25 TABLET, FILM COATED ORAL at 22:48

## 2020-12-15 RX ADMIN — POLYETHYLENE GLYCOL 3350 1 BOTTLE: 17 POWDER, FOR SOLUTION ORAL at 18:33

## 2020-12-15 RX ADMIN — SODIUM CHLORIDE 1000 ML: 9 INJECTION, SOLUTION INTRAVENOUS at 11:04

## 2020-12-15 NOTE — CONSULTS
[facility]  Initial Inpatient Consult Note    Referring Provider: ER    Reason for Consultation: Lower GI bleed    Subjective     History of present illness:    70 y.o. male, followed as an outpatient by Dr. Millan, that we are asked to see for lower GI bleeding.    Patient presented with weakness and shortness of air with exertion.  He was found to have a hemoglobin of 6.6.  On further questioning he reports bright red blood per rectum for the past several days.  He also notes that his stools have been loose.  He is anticoagulated with outpatient Eliquis.  Last dose was last evening.  He denies any abdominal pain.  He said no fevers or chills.  He denies any chest pain.  His last colonoscopy was performed by Dr. Millan in February 2019 and was notable for internal hemorrhoids as well as a polyp.  Path notable for tubular adenoma.  Biopsies were taken at that time for diarrhea which showed no inflammation.  Patient reports he has had rectal bleeding in the past but never as much as recently.    The patient's past medical history is notable for prior esophagectomy in 2015 due to a history of esophageal cancer.    Past Medical History:  Past Medical History:   Diagnosis Date   • Acute deep vein thrombosis (DVT) of popliteal vein of left lower extremity (CMS/HCC) 03/24/2020   • Anemia    • Anti-phospholipid syndrome (CMS/HCC)     On lifelong anticoagulation therapy   • Bladder disorder     LEAKAGE   ON MED  wers pads   • Community acquired pneumonia     HISTORY OF IN 2014   • Deep venous thrombosis (CMS/HCC) 2006, 2008    Left lower extremity multiple   • Depression    • Esophageal carcinoma (CMS/HCC) 12/31/2014    had chemo and radiation prior surgery   • Hemorrhoids    • HH (hiatus hernia)    • History of atrial fibrillation 2015    ONE EPISODE WHILE HOSPITALIZED   • History of kidney stones    • History of nephrolithiasis    • History of pancreatitis     PT STATES MANY YEARS AGO   •  History of radiation therapy    • Hypertension    • Long-term (current) use of anticoagulants, INR goal 2.0-3.0    • Lymphedema    • Malignant neoplasm of prostate (CMS/HCC)    • Other hyperlipidemia 1/30/2018 January 30, 2018 lipid panel risk 12.8%   • Restless legs syndrome    • Shortness of breath    • Squamous carcinoma     on the head     Past Surgical History:  Past Surgical History:   Procedure Laterality Date   • APPENDECTOMY  1950   • BRONCHOSCOPY      (Diagnostic)   • CATARACT EXTRACTION Bilateral 2014   • COLONOSCOPY  12/15/2014    Complete / Description: EH, IH, torts, stool, follow-up colonoscopy due in 5 years.   • COLONOSCOPY N/A 6/13/2017    non-thrombosed external hemorrhoids, normal examined ileum, IH   • COLONOSCOPY N/A 2/26/2019    Procedure: COLONOSCOPY with Cold Polypectomy;  Surgeon: Mulugeta Millan MD;  Location: [facility] ENDOSCOPY;  Service: Gastroenterology   • CYSTOSCOPY W/ LASER LITHOTRIPSY     • ENDOSCOPY N/A 6/13/2017    Procedure: ESOPHAGOGASTRODUODENOSCOPY WITH COLD BIOPSY;  Surgeon: Lake Gonzalez MD;  Location: [facility] ENDOSCOPY;  Service:    • ESOPHAGECTOMY      April 2015, stage IIB esophageal carcinoma, sub-total resection.   • ESOPHAGECTOMY      Esophagectomy Subtotal Andrea Joe Procedure   • EXCISION LESION  08/2012    Removal of Squamous Cell CA on Head   • HAMMER TOE REPAIR  09/2014    Hammertoe Operation (Each Toe), 10/2014   • HAMMER TOE REPAIR Left 10/3/2017    Procedure: Left second third and fourth distal interphalangeal joint resection with flexor tenotomy;  Surgeon: Mulugeta Lira MD;  Location: [facility] OR [facility];  Service:    • HERNIA REPAIR      incisional   • JEJUNOSTOMY      Laparoscopic   • JEJUNOSTOMY      tube removal    • KNEE SURGERY Bilateral 1967, 1973, 1981   • PATELLA SURGERY Left     removed   • PILONIDAL CYST / SINUS EXCISION     • [place] TOTAL KNEE ARTHROPLASTY Right 3/26/2018    Procedure: TOTAL KNEE ARTHROPLASTY;  Surgeon: Renny Solis MD;   Location: [facility] MAIN OR;  Service: Orthopedics   • PROSTATECTOMY     • PYLOROPLASTY     • SPINAL FUSION  1998    C 5,6   • TONSILLECTOMY     • TONSILLECTOMY     • UPPER GASTROINTESTINAL ENDOSCOPY  12/15/2014    LA Grade D esophagitis, Pardo's, HH, multiple duodenal ulcers   • VENTRAL/INCISIONAL HERNIA REPAIR N/A 2016    Procedure: VENTRAL/INCISIONAL HERNIA REPAIR, open, with mesh, and component separation;  Surgeon: Darren Rivas MD;  Location: [facility] MAIN OR;  Service:       Social History:   Social History     Tobacco Use   • Smoking status: Former Smoker     Packs/day: 2.00     Years: 3.00     Pack years: 6.00     Types: Cigarettes     Quit date:      Years since quittin.9   • Smokeless tobacco: Never Used   • Tobacco comment: no caffeine    Substance Use Topics   • Alcohol use: Yes     Frequency: 2-3 times a week     Comment: 2-3 x per week      Family History:  Family History   Problem Relation Age of Onset   • Cerebral aneurysm Mother         cerebral artery aneurysm ( age 56)   • Prostate cancer Brother 68   • Anxiety disorder Father    • Suicide Attempts Father          of suicide   • Cancer Father         bladder   • No Known Problems Brother    • Pancreatic cancer Nephew    • Colon cancer Neg Hx    • Esophageal cancer Neg Hx    • Dementia Neg Hx        Home Meds:  (Not in a hospital admission)    Current Meds:   cyanocobalamin, 1,000 mcg, Intramuscular, Q28 Days      Allergies:  No Known Allergies  Review of Systems  Pertinent items are noted in HPI, all other systems reviewed and negative     Objective     Vital Signs  Temp:  [96.5 °F (35.8 °C)-97.4 °F (36.3 °C)] 97.4 °F (36.3 °C)  Heart Rate:  [61-90] 90  Resp:  [16] 16  BP: (102-133)/(50-63) 133/63  Physical Exam:  General Appearance:    Alert, cooperative, in no acute distress   Head:    Normocephalic, without obvious abnormality, atraumatic   Eyes:          conjunctivae and sclerae normal, no   icterus    Throat:   no thrush, oral mucosa moist   Neck:   Supple, no adenopathy   Lungs:     Clear to auscultation bilaterally    Heart:    Regular rhythm and normal rate    Chest Wall:    No abnormalities observed   Abdomen:     Soft, nondistended, nontender; normal bowel sounds   Extremities:  2-3+ lower extremity edema bilaterally   Skin:   No bruising or rash   Psychiatric:  normal mood and insight     Results Review:   I reviewed the patient's new clinical results.    Results from last 7 days   Lab Units 12/15/20  1041   WBC 10*3/mm3 3.17*   HEMOGLOBIN g/dL 6.6*   HEMATOCRIT % 20.5*   PLATELETS 10*3/mm3 163     Results from last 7 days   Lab Units 12/15/20  1041   SODIUM mmol/L 141   POTASSIUM mmol/L 4.1   CHLORIDE mmol/L 112*   CO2 mmol/L 22.0   BUN mg/dL 19   CREATININE mg/dL 1.19   CALCIUM mg/dL 8.4*   BILIRUBIN mg/dL 0.2   ALK PHOS U/L 89   ALT (SGPT) U/L 12   AST (SGOT) U/L 22   GLUCOSE mg/dL 122*         Lab Results   Lab Value Date/Time    LIPASE 18 08/30/2016 2057       Radiology:  XR Chest 1 View   Final Result          Assessment/Plan   Patient Active Problem List   Diagnosis   • Adenocarcinoma of esophagus (CMS/HCC)   • Adenomatous polyp of colon   • Malignant neoplasm of prostate (CMS/HCC)   • RLS (restless legs syndrome)   • Recurrent major depressive disorder, in partial remission (CMS/HCC)   • Hypertension   • Anemia   • Insomnia due to other mental disorder   • Peripheral polyneuropathy   • B12 deficiency   • Postsurgical malabsorption   • Lymphedema   • Iron deficiency   • Gastroparesis   • Acute deep vein thrombosis (DVT) of popliteal vein of left lower extremity (CMS/HCC)   • Tremor of both hands   • Gastrointestinal hemorrhage       Assessment:  1. Lower GI bleed  2. Anticoagulated with Eliquis  3. Acute blood loss anemia    Plan:  · Patient certainly has had hemorrhoidal bleeding in the past however he has had persistent acute bleeding for the past several days with a significant decline in his  hemoglobin.  At this point he warrants colonoscopic evaluation.  · Hold Eliquis  · Transfusing currently-repeat hemoglobin posttransfusion and follow  · We will prep him today for colonoscopy tomorrow-further recommendations based upon the results of exam      I discussed the patients findings and my recommendations with patient.    Mesha Sanchez MD

## 2020-12-15 NOTE — ED TRIAGE NOTES
Has had blood in stool x a couple days.  Started feeling soa c exertion yesterday    Patient was placed in face mask during first look triage.  Patient was wearing a face mask throughout encounter.  I wore personal protective equipment throughout the encounter.  Hand hygiene was performed before and after patient encounter.

## 2020-12-15 NOTE — ED NOTES
Pt states bright red stools that have been infrequent the past few days. Pt states it happens when he's straining to use the bathroom. Pt confirms being on blood thinners. Denies abdominal pain. Pt also states SOA with exertion with moist cough. Pt denies fever or COVID exposure. Pt is in NAD at this time     Patient in mask. This RN in appropriate PPE - including mask, goggles, and gloves during all of patient care.        Nicky Feng, RN  12/15/20 [id number]

## 2020-12-15 NOTE — PLAN OF CARE
Goal Outcome Evaluation:  Plan of Care Reviewed With: patient     Outcome Summary: GI CONSULT PENDING

## 2020-12-15 NOTE — ED PROVIDER NOTES
Pt presents to the ED complaining of mild, intermittent SOA with exertion that started 2 days ago. Pt also c/o BRB in stool. Pt denies abd pain, lightheadedness, dizziness, CP and fever. Pt is anticoagulated on Eliquis. No known hx of diverticulosis.     On exam, pt is A&Ox3. NAD, PERRL, pale conjunctiva moist mucous membranes. Heart is RRR, lungs are CTAB. Abd is soft, non tender, non distended, bowel sounds are diminished. No pedal edema.  Normocephalic, atraumatic. Non-focal neuro exam      I agree with midlevel plan to admit pt as hemoglobin is 6.6. Pt understands and agrees with the plan. All questions were answered.  We will order blood transfusion for the patient.  We have consulted Dr. Sanchez from GI.    EKG    EKG time: 1134  Rhythm/Rate: NSR, 64  No Acute Ischemia  Non-Specific ST-T changes    No change compared to prior on 3/24/2020    Interpreted Contemporaneously by me.  Independently viewed by me      PPE  Patient does not present with complaints for COVID19. However, my scribe and I were both wearing masks throughout any patient interaction.       The ALCIDES and I have discussed this patient's history, physical exam, and treatment plan.  I have reviewed the documentation and personally had a face to face interaction with the patient. I affirm the documentation and agree with the treatment and plan.  The attached note describes my personal findings.    --  Documentation assistance provided by eunice Strong for Dr. Luiz Prieto.  Information recorded by the scribe was done at my direction and has been verified and validated by me.         Tae Ortega  12/15/20 1211       Andrade Prieto MD  12/15/20 1112

## 2020-12-15 NOTE — ED PROVIDER NOTES
EMERGENCY DEPARTMENT ENCOUNTER    Room Number:  03/03  Date of encounter:  12/15/2020  PCP: Brandin Bolton MD  Historian: Patient      PPE    Patient was placed in face mask in first look. Patient was wearing facemask when I entered the room and throughout our encounter. I wore full protective equipment throughout this patient encounter including a face mask, and gloves. Hand hygiene was performed before donning protective equipment and after removal when leaving the room.        HPI:  Chief Complaint: Rectal bleeding  A complete HPI/ROS/PMH/PSH/SH/FH are unobtainable due to: Nothing    Context: Jose Hurtado is a 70 y.o. male who arrives to the ED via private vehicle.  Patient presents with c/o mild, intermittent, shortness of breath with exertion for the past 2 days.   Patient also complains of bright red blood in his stool for the past 2 days also.  Patient states that with any exertion he becomes short of air, once he sits down he returns to normal.  He also states that he has noticed bright red blood in his stools with wiping and in the commode for the past 2 days.  He states his last colonoscopy was a couple of years ago.  He is currently on Eliquis.    Patient denies fever, chills, chest pain, nausea, vomiting, abdominal pain, dizziness or lightheadedness or dysuria.  Patient states that remaining still makes the symptoms better and exertion worsens symptoms.          PAST MEDICAL HISTORY  Active Ambulatory Problems     Diagnosis Date Noted   • Adenocarcinoma of esophagus (CMS/HCC) 02/11/2016   • Adenomatous polyp of colon 02/11/2016   • Malignant neoplasm of prostate (CMS/HCC) 02/11/2016   • RLS (restless legs syndrome) 02/11/2016   • Recurrent major depressive disorder, in partial remission (CMS/HCC) 01/26/2017   • Hypertension 04/12/2018   • Anemia 04/18/2019   • Insomnia due to other mental disorder 06/06/2019   • Peripheral polyneuropathy 06/06/2019   • B12 deficiency 06/07/2019   • Postsurgical  malabsorption 10/25/2019   • Lymphedema 01/14/2020   • Iron deficiency 02/10/2020   • Gastroparesis 02/15/2020   • Acute deep vein thrombosis (DVT) of popliteal vein of left lower extremity (CMS/HCC) 03/24/2020   • Tremor of both hands 07/27/2020     Resolved Ambulatory Problems     Diagnosis Date Noted   • Dysphagia 02/11/2016   • Duodenal ulcer 02/11/2016   • Decreased body weight 02/11/2016   • Cough    • Hyperkalemia 02/15/2016   • History of deep vein thrombosis 03/22/2016   • DVT (deep venous thrombosis) (CMS/HCC) 08/24/2017   • Other hyperlipidemia 01/30/2018   • Anti-phospholipid syndrome (CMS/[facility]) 05/10/2018   • Pneumonia of right lower lobe due to infectious organism 02/10/2020     Past Medical History:   Diagnosis Date   • Bladder disorder    • Community acquired pneumonia    • Deep venous thrombosis (CMS/[facility]) 2006, 2008   • Depression    • Esophageal carcinoma (CMS/[facility]) 12/31/2014   • Hemorrhoids    • HH (hiatus hernia)    • History of atrial fibrillation 2015   • History of kidney stones    • History of nephrolithiasis    • History of pancreatitis    • History of radiation therapy    • Long-term (current) use of anticoagulants, INR goal 2.0-3.0    • Restless legs syndrome    • Shortness of breath    • Squamous carcinoma          PAST SURGICAL HISTORY  Past Surgical History:   Procedure Laterality Date   • APPENDECTOMY  1950   • BRONCHOSCOPY      (Diagnostic)   • CATARACT EXTRACTION Bilateral 2014   • COLONOSCOPY  12/15/2014    Complete / Description: EH, IH, torts, stool, follow-up colonoscopy due in 5 years.   • COLONOSCOPY N/A 6/13/2017    non-thrombosed external hemorrhoids, normal examined ileum, IH   • COLONOSCOPY N/A 2/26/2019    Procedure: COLONOSCOPY with Cold Polypectomy;  Surgeon: Mulugeta Millan MD;  Location: [facility] ENDOSCOPY;  Service: Gastroenterology   • CYSTOSCOPY W/ LASER LITHOTRIPSY     • ENDOSCOPY N/A 6/13/2017    Procedure: ESOPHAGOGASTRODUODENOSCOPY WITH COLD BIOPSY;  Surgeon:  Lake Gonzalez MD;  Location: [facility] ENDOSCOPY;  Service:    • ESOPHAGECTOMY      2015, stage IIB esophageal carcinoma, sub-total resection.   • ESOPHAGECTOMY      Esophagectomy Subtotal Rueter Joe Procedure   • EXCISION LESION  2012    Removal of Squamous Cell CA on Head   • HAMMER TOE REPAIR  2014    Hammertoe Operation (Each Toe), 10/2014   • HAMMER TOE REPAIR Left 10/3/2017    Procedure: Left second third and fourth distal interphalangeal joint resection with flexor tenotomy;  Surgeon: Mulugeta Lira MD;  Location: [facility] OR OSC;  Service:    • HERNIA REPAIR      incisional   • JEJUNOSTOMY      Laparoscopic   • JEJUNOSTOMY      tube removal    • KNEE SURGERY Bilateral , ,    • PATELLA SURGERY Left     removed   • PILONIDAL CYST / SINUS EXCISION     • VA TOTAL KNEE ARTHROPLASTY Right 3/26/2018    Procedure: TOTAL KNEE ARTHROPLASTY;  Surgeon: Renny Solis MD;  Location: [facility] OR;  Service: Orthopedics   • PROSTATECTOMY     • PYLOROPLASTY     • SPINAL FUSION  1998    C 5,6   • TONSILLECTOMY     • TONSILLECTOMY     • UPPER GASTROINTESTINAL ENDOSCOPY  12/15/2014    LA Grade D esophagitis, Pardo's, HH, multiple duodenal ulcers   • VENTRAL/INCISIONAL HERNIA REPAIR N/A 2016    Procedure: VENTRAL/INCISIONAL HERNIA REPAIR, open, with mesh, and component separation;  Surgeon: Darren Rivas MD;  Location: [facility] OR;  Service:          FAMILY HISTORY  Family History   Problem Relation Age of Onset   • Cerebral aneurysm Mother         cerebral artery aneurysm ( age 56)   • Prostate cancer Brother 68   • Anxiety disorder Father    • Suicide Attempts Father          of suicide   • Cancer Father         bladder   • No Known Problems Brother    • Pancreatic cancer Nephew    • Colon cancer Neg Hx    • Esophageal cancer Neg Hx    • Dementia Neg Hx          SOCIAL HISTORY  Social History     Socioeconomic History   • Marital status:       Spouse name: Lena   • Number of children: 0   • Years of education: College   • Highest education level: Not on file   Occupational History   • Occupation: Retired      Comment: Retired    Tobacco Use   • Smoking status: Former Smoker     Packs/day: 2.00     Years: 3.00     Pack years: 6.00     Types: Cigarettes     Quit date:      Years since quittin.9   • Smokeless tobacco: Never Used   • Tobacco comment: no caffeine    Substance and Sexual Activity   • Alcohol use: Yes     Frequency: 2-3 times a week     Comment: 2-3 x per week   • Drug use: No   • Sexual activity: Defer   Social History Narrative    self-employed          ALLERGIES  Patient has no known allergies.        REVIEW OF SYSTEMS  Review of Systems     All systems reviewed and negative except for those discussed in HPI.        PHYSICAL EXAM    ED Triage Vitals   Temp Heart Rate Resp BP SpO2   12/15/20 0945 12/15/20 0945 12/15/20 0945 12/15/20 1041 12/15/20 0945   96.5 °F (35.8 °C) 78 16 105/60 98 %       Physical Exam  GENERAL: Well appearing, non-toxic appearing, not distressed  HENT: normocephalic, atraumatic  EYES: no scleral icterus, PERRL, pale sclera  CV: regular rhythm, regular rate, no murmur  RESPIRATORY: normal effort, CTAB  ABDOMEN: soft, nontender, normal bowel sounds  Rectal exam performed.  Chaperone present throughout exam, WAQAS Saunders  External exam normal.  No visualized external hemorrhoids, no palpated internal hemorrhoids.  No visible anal fissures.  Heme positive, light brown stool noted.  passed.  MUSCULOSKELETAL: no deformity  NEURO: alert, moves all extremities, follows commands, mental status normal/baseline  SKIN: warm, dry, no rash, pallor  Psych: Appropriate mood and affect  Nursing notes and vital signs reviewed      LAB RESULTS  Recent Results (from the past 24 hour(s))   Light Blue Top    Collection Time: 12/15/20 10:41 AM   Result Value Ref Range    Extra Tube  hold for add-on    Green Top (Gel)    Collection Time: 12/15/20 10:41 AM   Result Value Ref Range    Extra Tube Hold for add-ons.    Lavender Top    Collection Time: 12/15/20 10:41 AM   Result Value Ref Range    Extra Tube hold for add-on    Gold Top - SST    Collection Time: 12/15/20 10:41 AM   Result Value Ref Range    Extra Tube Hold for add-ons.    Comprehensive Metabolic Panel    Collection Time: 12/15/20 10:41 AM    Specimen: Blood   Result Value Ref Range    Glucose 122 (H) 65 - 99 mg/dL    BUN 19 8 - 23 mg/dL    Creatinine 1.19 0.76 - 1.27 mg/dL    Sodium 141 136 - 145 mmol/L    Potassium 4.1 3.5 - 5.2 mmol/L    Chloride 112 (H) 98 - 107 mmol/L    CO2 22.0 22.0 - 29.0 mmol/L    Calcium 8.4 (L) 8.6 - 10.5 mg/dL    Total Protein 6.0 6.0 - 8.5 g/dL    Albumin 3.50 3.50 - 5.20 g/dL    ALT (SGPT) 12 1 - 41 U/L    AST (SGOT) 22 1 - 40 U/L    Alkaline Phosphatase 89 39 - 117 U/L    Total Bilirubin 0.2 0.0 - 1.2 mg/dL    eGFR Non African Amer 60 (L) >60 mL/min/1.73    Globulin 2.5 gm/dL    A/G Ratio 1.4 g/dL    BUN/Creatinine Ratio 16.0 7.0 - 25.0    Anion Gap 7.0 5.0 - 15.0 mmol/L   Troponin    Collection Time: 12/15/20 10:41 AM    Specimen: Blood   Result Value Ref Range    Troponin T 0.053 (C) 0.000 - 0.030 ng/mL   CBC Auto Differential    Collection Time: 12/15/20 10:41 AM    Specimen: Blood   Result Value Ref Range    WBC 3.17 (L) 3.40 - 10.80 10*3/mm3    RBC 2.20 (L) 4.14 - 5.80 10*6/mm3    Hemoglobin 6.6 (C) 13.0 - 17.7 g/dL    Hematocrit 20.5 (C) 37.5 - 51.0 %    MCV 93.2 79.0 - 97.0 fL    MCH 30.0 26.6 - 33.0 pg    MCHC 32.2 31.5 - 35.7 g/dL    RDW 13.8 12.3 - 15.4 %    RDW-SD 46.5 37.0 - 54.0 fl    MPV 9.2 6.0 - 12.0 fL    Platelets 163 140 - 450 10*3/mm3    Neutrophil % 56.2 42.7 - 76.0 %    Lymphocyte % 26.5 19.6 - 45.3 %    Monocyte % 9.5 5.0 - 12.0 %    Eosinophil % 6.6 (H) 0.3 - 6.2 %    Basophil % 0.9 0.0 - 1.5 %    Immature Grans % 0.3 0.0 - 0.5 %    Neutrophils, Absolute 1.78 1.70 - 7.00 10*3/mm3     Lymphocytes, Absolute 0.84 0.70 - 3.10 10*3/mm3    Monocytes, Absolute 0.30 0.10 - 0.90 10*3/mm3    Eosinophils, Absolute 0.21 0.00 - 0.40 10*3/mm3    Basophils, Absolute 0.03 0.00 - 0.20 10*3/mm3    Immature Grans, Absolute 0.01 0.00 - 0.05 10*3/mm3    nRBC 0.3 (H) 0.0 - 0.2 /100 WBC   Lactic Acid, Plasma    Collection Time: 12/15/20 11:05 AM    Specimen: Blood   Result Value Ref Range    Lactate 1.0 0.5 - 2.0 mmol/L   Type & Screen    Collection Time: 12/15/20 11:05 AM    Specimen: Blood   Result Value Ref Range    ABO Type O     RH type Positive     Antibody Screen Negative     T&S Expiration Date 12/18/2020 11:59:59 PM    POCT Occult Blood Stool    Collection Time: 12/15/20 11:18 AM    Specimen: Per Rectum; Stool   Result Value Ref Range    Fecal Occult Blood Positive (A) Negative    Lot Number [id number]     Expiration Date 03/31/2021     Positive Control Positive Positive    Negative Control Negative Negative   ECG 12 Lead    Collection Time: 12/15/20 11:34 AM   Result Value Ref Range    QT Interval 456 ms   Respiratory Panel PCR w/COVID-19(SARS-CoV-2) TONIE/HOWARD/CARMEN/PAD/COR/MAD/ALEXANDER In-House, NP Swab in UTM/VTM, 3-4 HR TAT - Swab, Nasopharynx    Collection Time: 12/15/20 12:08 PM    Specimen: Nasopharynx; Swab   Result Value Ref Range    ADENOVIRUS, PCR Not Detected Not Detected    Coronavirus 229E Not Detected Not Detected    Coronavirus HKU1 Not Detected Not Detected    Coronavirus NL63 Not Detected Not Detected    Coronavirus OC43 Not Detected Not Detected    COVID19 Not Detected Not Detected - Ref. Range    Human Metapneumovirus Not Detected Not Detected    Human Rhinovirus/Enterovirus Not Detected Not Detected    Influenza A PCR Not Detected Not Detected    Influenza B PCR Not Detected Not Detected    Parainfluenza Virus 1 Not Detected Not Detected    Parainfluenza Virus 2 Not Detected Not Detected    Parainfluenza Virus 3 Not Detected Not Detected    Parainfluenza Virus 4 Not Detected Not Detected    RSV, PCR  Not Detected Not Detected    Bordetella pertussis pcr Not Detected Not Detected    Bordetella parapertussis PCR Not Detected Not Detected    Chlamydophila pneumoniae PCR Not Detected Not Detected    Mycoplasma pneumo by PCR Not Detected Not Detected   Prepare RBC, 2 Units    Collection Time: 12/15/20 12:20 PM   Result Value Ref Range    Product Code K0112M80     Unit Number [id number]     UNIT  ABO O     UNIT  RH POS     Crossmatch Interpretation Compatible     Dispense Status XM     Blood Expiration Date 202101132359     Blood Type Barcode 5100     Product Code V8406G08     Unit Number [id number]     UNIT  ABO O     UNIT  RH POS     Crossmatch Interpretation Compatible     Dispense Status IS     Blood Expiration Date 202101132359     Blood Type Barcode 5100        Ordered the above labs and independently reviewed the results.      RADIOLOGY  Xr Chest 1 View    Result Date: 12/15/2020  XR CHEST 1 VW-  Clinical: Shortness of breath  COMPARISON CT chest 05/01/2020 and chest radiograph 03/26/2020  FINDINGS: There is stable prominence of the mediastinum from prior gastric pull-through. The right-sided pleural effusion has diminished in size. Right rib deformities again noted. There is cardiac enlargement. No pulmonary edema identified. No acute consolidation seen. There is persistent right infrahilar atelectasis again demonstrated.  CONCLUSION: The right-sided pleural effusion has diminished in size, small amount remains on the current examination. No edema or acute airspace disease has developed, there is widening of the mediastinum from previous gastric pull-through procedure. The remainder is unremarkable.  This report was finalized on 12/15/2020 11:39 AM by Dr. Sarbjit Luis M.D.        I ordered the above noted radiological studies and viewed the images on the PACS system.       EKG      Independently viewed by me and interpreted by Dr Prieto         MEDICAL RECORD REVIEW  Medical records reviewed in  epic      PROCEDURES    Critical Care  Performed by: Sheri Lam APRN  Authorized by: Andrade Prieto MD     Critical care provider statement:     Critical care time (minutes):  30    Critical care was necessary to treat or prevent imminent or life-threatening deterioration of the following conditions:  Circulatory failure (anemia)    Critical care was time spent personally by me on the following activities:  Ordering and performing treatments and interventions, development of treatment plan with patient or surrogate, ordering and review of laboratory studies, discussions with consultants, ordering and review of radiographic studies, discussions with primary provider, pulse oximetry, re-evaluation of patient's condition, evaluation of patient's response to treatment, review of old charts and obtaining history from patient or surrogate            DIFFERENTIAL DIAGNOSIS  Differential diagnosis include but are not limited to the following: GI bleed, anemia, electrolyte abnormality, pneumonia, dyspnea      PROGRESS, DATA ANALYSIS, CONSULTS, AND MEDICAL DECISION MAKING        ED Course as of Dec 15 1445   Tue Dec 15, 2020   1101 Discussed pertinent information from history and physical exam with patient.  Discussed differential diagnosis and plan for ED evaluation/work-up and treatment including labs, chest x-ray.  All questions answered.  Patient is agreeable with this plan.        [MS]   1134 Hemoglobin(!!): 6.6 [MS]   1135 Troponin T(!!): 0.053 [MS]   1155 Hemoglobin was 11.7,5 months ago, 2 units of RBCs were ordered for transfusion.  Patient has had elevated troponins in the last 8 months.  He does deny chest pain at this time today.    [MS]   1320 Consult Note    Discussed care with Dr Orozco  Reviewed patient's history, exam, results and need for admission secondary to GI bleed, anemia, elevated troponin  Dr. Orozco accepts the patient to be admitted to inpatient telemetry bed.        [MS]   6826  Discussed with Dr Sanchez regarding patient's relevant history, exam, workup and ED findings/concerns.  Dr Sanhcez agrees to see patient in consult.        [MS]      ED Course User Index  [MS] Sheri Lam, APRSANAZ     ADMISSION    Discussed treatment plan and reason for admission with pt/family and admitting physician.  Pt/family voiced understanding of the plan for admission for further testing/treatment as needed.      DIAGNOSIS  Final diagnoses:   Gastrointestinal hemorrhage, unspecified gastrointestinal hemorrhage type   Anemia, unspecified type   Elevated troponin           MEDICATIONS GIVEN IN ED    Medications   sodium chloride 0.9 % flush 10 mL (has no administration in time range)   sodium chloride 0.9 % bolus 1,000 mL (0 mL Intravenous Stopped 12/15/20 3670)           COURSE & MEDICAL DECISION MAKING  Any/All labs and Any/All Imaging studies that were ordered were reviewed and are noted above.  Results were reviewed/discussed with the patient and they were also made aware of online assess.   Pt also made aware that some labs, such as cultures, will not be resulted during ER visit and follow up with PMD is necessary.        Sheri Lam, APRN  12/15/20 3473

## 2020-12-15 NOTE — TELEPHONE ENCOUNTER
Pt called in c c/o new onset SOA c exertion and SOA not subsiding once sitting after 10-15min. Pt unable to catch his breath. Pt was instructed to go to ER for further eval. Pt declined at that moment. I then went through phone calls and seen that he called in with blood in stool on 12/15/2020. Pt is currently on Eliquis c hx of DVT and HTN. Pt was again instructed to go to ER for further eval. Pt stated he will go at this point. MM

## 2020-12-16 ENCOUNTER — ANESTHESIA (OUTPATIENT)
Dept: GASTROENTEROLOGY | Facility: HOSPITAL | Age: 70
End: 2020-12-16

## 2020-12-16 ENCOUNTER — ANESTHESIA EVENT (OUTPATIENT)
Dept: GASTROENTEROLOGY | Facility: HOSPITAL | Age: 70
End: 2020-12-16

## 2020-12-16 PROBLEM — D62 ACUTE BLOOD LOSS ANEMIA: Status: ACTIVE | Noted: 2020-12-16

## 2020-12-16 PROBLEM — J44.9 COPD (CHRONIC OBSTRUCTIVE PULMONARY DISEASE): Status: ACTIVE | Noted: 2020-12-16

## 2020-12-16 PROBLEM — D61.818 PANCYTOPENIA: Status: ACTIVE | Noted: 2020-12-16

## 2020-12-16 PROBLEM — I87.099 CHRONIC VENOUS HYPERTENSION DUE TO DVT: Status: ACTIVE | Noted: 2020-12-16

## 2020-12-16 LAB
ANION GAP SERPL CALCULATED.3IONS-SCNC: 7.2 MMOL/L (ref 5–15)
BASOPHILS # BLD AUTO: 0.03 10*3/MM3 (ref 0–0.2)
BASOPHILS NFR BLD AUTO: 1 % (ref 0–1.5)
BH BB BLOOD EXPIRATION DATE: NORMAL
BH BB BLOOD EXPIRATION DATE: NORMAL
BH BB BLOOD TYPE BARCODE: 5100
BH BB BLOOD TYPE BARCODE: 5100
BH BB DISPENSE STATUS: NORMAL
BH BB DISPENSE STATUS: NORMAL
BH BB PRODUCT CODE: NORMAL
BH BB PRODUCT CODE: NORMAL
BH BB UNIT NUMBER: NORMAL
BH BB UNIT NUMBER: NORMAL
BUN SERPL-MCNC: 14 MG/DL (ref 8–23)
BUN/CREAT SERPL: 13.6 (ref 7–25)
CALCIUM SPEC-SCNC: 8.1 MG/DL (ref 8.6–10.5)
CHLORIDE SERPL-SCNC: 110 MMOL/L (ref 98–107)
CO2 SERPL-SCNC: 21.8 MMOL/L (ref 22–29)
CREAT SERPL-MCNC: 1.03 MG/DL (ref 0.76–1.27)
CROSSMATCH INTERPRETATION: NORMAL
CROSSMATCH INTERPRETATION: NORMAL
DEPRECATED RDW RBC AUTO: 45.7 FL (ref 37–54)
EOSINOPHIL # BLD AUTO: 0.17 10*3/MM3 (ref 0–0.4)
EOSINOPHIL NFR BLD AUTO: 5.5 % (ref 0.3–6.2)
ERYTHROCYTE [DISTWIDTH] IN BLOOD BY AUTOMATED COUNT: 13.8 % (ref 12.3–15.4)
FERRITIN SERPL-MCNC: 39.9 NG/ML (ref 30–400)
GFR SERPL CREATININE-BSD FRML MDRD: 71 ML/MIN/1.73
GLUCOSE SERPL-MCNC: 85 MG/DL (ref 65–99)
HCT VFR BLD AUTO: 21.2 % (ref 37.5–51)
HCT VFR BLD AUTO: 22.7 % (ref 37.5–51)
HCT VFR BLD AUTO: 22.7 % (ref 37.5–51)
HGB BLD-MCNC: 7 G/DL (ref 13–17.7)
HGB BLD-MCNC: 7.5 G/DL (ref 13–17.7)
HGB BLD-MCNC: 7.5 G/DL (ref 13–17.7)
IMM GRANULOCYTES # BLD AUTO: 0.01 10*3/MM3 (ref 0–0.05)
IMM GRANULOCYTES NFR BLD AUTO: 0.3 % (ref 0–0.5)
INR PPP: 1.27 (ref 0.9–1.1)
IRON 24H UR-MRATE: 53 MCG/DL (ref 59–158)
IRON SATN MFR SERPL: 17 % (ref 20–50)
LYMPHOCYTES # BLD AUTO: 0.78 10*3/MM3 (ref 0.7–3.1)
LYMPHOCYTES NFR BLD AUTO: 25.1 % (ref 19.6–45.3)
MAGNESIUM SERPL-MCNC: 2 MG/DL (ref 1.6–2.4)
MCH RBC QN AUTO: 30.1 PG (ref 26.6–33)
MCHC RBC AUTO-ENTMCNC: 33 G/DL (ref 31.5–35.7)
MCV RBC AUTO: 91.2 FL (ref 79–97)
MONOCYTES # BLD AUTO: 0.31 10*3/MM3 (ref 0.1–0.9)
MONOCYTES NFR BLD AUTO: 10 % (ref 5–12)
NEUTROPHILS NFR BLD AUTO: 1.81 10*3/MM3 (ref 1.7–7)
NEUTROPHILS NFR BLD AUTO: 58.1 % (ref 42.7–76)
NRBC BLD AUTO-RTO: 0 /100 WBC (ref 0–0.2)
PLATELET # BLD AUTO: 102 10*3/MM3 (ref 140–450)
PMV BLD AUTO: 9 FL (ref 6–12)
POTASSIUM SERPL-SCNC: 4.1 MMOL/L (ref 3.5–5.2)
POTASSIUM SERPL-SCNC: 4.1 MMOL/L (ref 3.5–5.2)
PROTHROMBIN TIME: 15.6 SECONDS (ref 11.7–14.2)
RBC # BLD AUTO: 2.49 10*6/MM3 (ref 4.14–5.8)
SODIUM SERPL-SCNC: 139 MMOL/L (ref 136–145)
TIBC SERPL-MCNC: 311 MCG/DL (ref 298–536)
TRANSFERRIN SERPL-MCNC: 209 MG/DL (ref 200–360)
TROPONIN T SERPL-MCNC: 0.07 NG/ML (ref 0–0.03)
UNIT  ABO: NORMAL
UNIT  ABO: NORMAL
UNIT  RH: NORMAL
UNIT  RH: NORMAL
WBC # BLD AUTO: 3.11 10*3/MM3 (ref 3.4–10.8)

## 2020-12-16 PROCEDURE — 0DJD8ZZ INSPECTION OF LOWER INTESTINAL TRACT, VIA NATURAL OR ARTIFICIAL OPENING ENDOSCOPIC: ICD-10-PCS | Performed by: INTERNAL MEDICINE

## 2020-12-16 PROCEDURE — 85018 HEMOGLOBIN: CPT | Performed by: INTERNAL MEDICINE

## 2020-12-16 PROCEDURE — 80048 BASIC METABOLIC PNL TOTAL CA: CPT | Performed by: INTERNAL MEDICINE

## 2020-12-16 PROCEDURE — 25010000002 PROPOFOL 10 MG/ML EMULSION: Performed by: ANESTHESIOLOGY

## 2020-12-16 PROCEDURE — 85610 PROTHROMBIN TIME: CPT | Performed by: INTERNAL MEDICINE

## 2020-12-16 PROCEDURE — 84484 ASSAY OF TROPONIN QUANT: CPT | Performed by: HOSPITALIST

## 2020-12-16 PROCEDURE — 83735 ASSAY OF MAGNESIUM: CPT | Performed by: HOSPITALIST

## 2020-12-16 PROCEDURE — 85014 HEMATOCRIT: CPT | Performed by: INTERNAL MEDICINE

## 2020-12-16 PROCEDURE — 85025 COMPLETE CBC W/AUTO DIFF WBC: CPT | Performed by: INTERNAL MEDICINE

## 2020-12-16 PROCEDURE — 45378 DIAGNOSTIC COLONOSCOPY: CPT | Performed by: INTERNAL MEDICINE

## 2020-12-16 PROCEDURE — 84132 ASSAY OF SERUM POTASSIUM: CPT | Performed by: HOSPITALIST

## 2020-12-16 RX ORDER — DOCUSATE SODIUM 100 MG/1
100 CAPSULE, LIQUID FILLED ORAL 2 TIMES DAILY
Status: DISCONTINUED | OUTPATIENT
Start: 2020-12-16 | End: 2020-12-18 | Stop reason: HOSPADM

## 2020-12-16 RX ORDER — PRAMIPEXOLE DIHYDROCHLORIDE 1.5 MG/1
1.5 TABLET ORAL 2 TIMES DAILY
Status: DISCONTINUED | OUTPATIENT
Start: 2020-12-16 | End: 2020-12-18 | Stop reason: HOSPADM

## 2020-12-16 RX ORDER — SODIUM CHLORIDE 9 MG/ML
30 INJECTION, SOLUTION INTRAVENOUS CONTINUOUS PRN
Status: DISCONTINUED | OUTPATIENT
Start: 2020-12-16 | End: 2020-12-16 | Stop reason: HOSPADM

## 2020-12-16 RX ORDER — FUROSEMIDE 20 MG/1
20 TABLET ORAL DAILY
Status: DISCONTINUED | OUTPATIENT
Start: 2020-12-17 | End: 2020-12-18 | Stop reason: HOSPADM

## 2020-12-16 RX ORDER — EPHEDRINE SULFATE 50 MG/ML
INJECTION, SOLUTION INTRAVENOUS AS NEEDED
Status: DISCONTINUED | OUTPATIENT
Start: 2020-12-16 | End: 2020-12-16 | Stop reason: SURG

## 2020-12-16 RX ORDER — PANTOPRAZOLE SODIUM 40 MG/1
40 TABLET, DELAYED RELEASE ORAL
Status: DISCONTINUED | OUTPATIENT
Start: 2020-12-16 | End: 2020-12-18 | Stop reason: HOSPADM

## 2020-12-16 RX ORDER — PAROXETINE HYDROCHLORIDE 20 MG/1
40 TABLET, FILM COATED ORAL EVERY MORNING
Status: DISCONTINUED | OUTPATIENT
Start: 2020-12-17 | End: 2020-12-18 | Stop reason: HOSPADM

## 2020-12-16 RX ORDER — PROPOFOL 10 MG/ML
VIAL (ML) INTRAVENOUS CONTINUOUS PRN
Status: DISCONTINUED | OUTPATIENT
Start: 2020-12-16 | End: 2020-12-16 | Stop reason: SURG

## 2020-12-16 RX ORDER — POTASSIUM CHLORIDE 750 MG/1
10 TABLET, EXTENDED RELEASE ORAL DAILY
Status: DISCONTINUED | OUTPATIENT
Start: 2020-12-17 | End: 2020-12-17 | Stop reason: CLARIF

## 2020-12-16 RX ORDER — ALBUTEROL SULFATE 2.5 MG/3ML
2.5 SOLUTION RESPIRATORY (INHALATION) EVERY 6 HOURS PRN
Status: DISCONTINUED | OUTPATIENT
Start: 2020-12-16 | End: 2020-12-18 | Stop reason: HOSPADM

## 2020-12-16 RX ORDER — HYDROCORTISONE ACETATE 25 MG/1
25 SUPPOSITORY RECTAL 2 TIMES DAILY
Status: DISCONTINUED | OUTPATIENT
Start: 2020-12-16 | End: 2020-12-18 | Stop reason: HOSPADM

## 2020-12-16 RX ORDER — LIDOCAINE HYDROCHLORIDE 20 MG/ML
INJECTION, SOLUTION INFILTRATION; PERINEURAL AS NEEDED
Status: DISCONTINUED | OUTPATIENT
Start: 2020-12-16 | End: 2020-12-16 | Stop reason: SURG

## 2020-12-16 RX ORDER — PROPOFOL 10 MG/ML
VIAL (ML) INTRAVENOUS AS NEEDED
Status: DISCONTINUED | OUTPATIENT
Start: 2020-12-16 | End: 2020-12-16 | Stop reason: SURG

## 2020-12-16 RX ADMIN — METOPROLOL TARTRATE 25 MG: 25 TABLET, FILM COATED ORAL at 21:00

## 2020-12-16 RX ADMIN — PROPOFOL 125 MCG/KG/MIN: 10 INJECTION, EMULSION INTRAVENOUS at 11:16

## 2020-12-16 RX ADMIN — SODIUM CHLORIDE 150 ML/HR: 9 INJECTION, SOLUTION INTRAVENOUS at 06:29

## 2020-12-16 RX ADMIN — SODIUM CHLORIDE 30 ML/HR: 9 INJECTION, SOLUTION INTRAVENOUS at 10:34

## 2020-12-16 RX ADMIN — HYDROCORTISONE ACETATE 25 MG: 25 SUPPOSITORY RECTAL at 16:30

## 2020-12-16 RX ADMIN — LIDOCAINE HYDROCHLORIDE 50 MG: 20 INJECTION, SOLUTION INFILTRATION; PERINEURAL at 11:15

## 2020-12-16 RX ADMIN — PRAMIPEXOLE DIHYDROCHLORIDE 1.5 MG: 1.5 TABLET ORAL at 21:00

## 2020-12-16 RX ADMIN — PROPOFOL 75 MG: 10 INJECTION, EMULSION INTRAVENOUS at 11:16

## 2020-12-16 RX ADMIN — EPHEDRINE SULFATE 7.5 MG: 50 INJECTION INTRAVENOUS at 11:37

## 2020-12-16 RX ADMIN — PANTOPRAZOLE SODIUM 40 MG: 40 TABLET, DELAYED RELEASE ORAL at 16:38

## 2020-12-16 RX ADMIN — HYDROCORTISONE ACETATE 25 MG: 25 SUPPOSITORY RECTAL at 21:01

## 2020-12-16 NOTE — PLAN OF CARE
Goal Outcome Evaluation:  Plan of Care Reviewed With: patient  Progress: no change  Outcome Summary: Received pt in bed. 2nd unit of PRBCs finished. VSS. Afebrile. Pt prepped for colonoscopy today. Amb to BR, assistx1. NAD. NS@150ml/hr. Will CTM.

## 2020-12-16 NOTE — ANESTHESIA PREPROCEDURE EVALUATION
Anesthesia Evaluation     Patient summary reviewed                Airway   No difficulty expected  Dental      Pulmonary    Cardiovascular     ECG reviewed  Rhythm: regular    (+) hypertension,       Neuro/Psych  GI/Hepatic/Renal/Endo    (+)  GI bleeding ,     Musculoskeletal     Abdominal    Substance History      OB/GYN          Other      history of cancer active      Other Comment: Hb 7.5                  Anesthesia Plan    ASA 3     MAC       Anesthetic plan, all risks, benefits, and alternatives have been provided, discussed and informed consent has been obtained with: patient.

## 2020-12-16 NOTE — PLAN OF CARE
Goal Outcome Evaluation:  Plan of Care Reviewed With: patient  Progress: no change   Vss. Had colonoscopy today, started on anusol suppository. Surgery consulted.H&H every 12 hrs. No c/o n/v or pain.

## 2020-12-16 NOTE — PROGRESS NOTES
Name: Jose Hurtado ADMIT: 12/15/2020   : 1950  PCP: Brandin Bolton MD    MRN: [id number] LOS: 1 days   AGE/SEX: 70 y.o. male  ROOM: [facility]     Subjective   Subjective   Patient had persistent rectal bleeding through the night.  Endoscopy results noted.  He says he feels pretty good now, no complaints    Review of Systems     Objective   Objective   Vital Signs  Temp:  [97.8 °F (36.6 °C)-98.6 °F (37 °C)] 97.8 °F (36.6 °C)  Heart Rate:  [62-93] 63  Resp:  [14-20] 16  BP: ()/(47-72) 118/72  SpO2:  [96 %-100 %] 100 %  on   ;   Device (Oxygen Therapy): room air  Body mass index is 24.9 kg/m².  Physical Exam  Vitals signs reviewed.   Constitutional:       General: He is not in acute distress.     Appearance: He is well-developed.   HENT:      Head: Normocephalic and atraumatic.      Mouth/Throat:      Pharynx: No oropharyngeal exudate.   Eyes:      General: No scleral icterus.  Neck:      Musculoskeletal: Neck supple.      Vascular: No JVD.   Cardiovascular:      Rate and Rhythm: Normal rate and regular rhythm.      Heart sounds: No murmur.   Pulmonary:      Effort: Pulmonary effort is normal. No respiratory distress.      Breath sounds: Normal breath sounds. No wheezing.   Abdominal:      General: Bowel sounds are normal. There is no distension.      Palpations: Abdomen is soft.      Tenderness: There is no abdominal tenderness.   Skin:     General: Skin is warm and dry.      Findings: No rash.   Neurological:      Mental Status: He is alert and oriented to person, place, and time.   Psychiatric:         Mood and Affect: Mood normal.         Thought Content: Thought content normal.         Results Review     I reviewed the patient's new clinical results.  Results from last 7 days   Lab Units 20  0833 12/15/20  2325 12/15/20  1625 12/15/20  1041   WBC 10*3/mm3 3.11*  --   --  3.17*   HEMOGLOBIN g/dL 7.5*  7.5* 7.3* 7.1* 6.6*   PLATELETS 10*3/mm3 102*  --   --  163     Results from last 7 days   Lab  Units 12/16/20  0833 12/15/20  1041   SODIUM mmol/L 139 141   POTASSIUM mmol/L 4.1 4.1   CHLORIDE mmol/L 110* 112*   CO2 mmol/L 21.8* 22.0   BUN mg/dL 14 19   CREATININE mg/dL 1.03 1.19   GLUCOSE mg/dL 85 122*   Estimated Creatinine Clearance: 87.4 mL/min (by C-G formula based on SCr of 1.03 mg/dL).  Results from last 7 days   Lab Units 12/15/20  1041   ALBUMIN g/dL 3.50   BILIRUBIN mg/dL 0.2   ALK PHOS U/L 89   AST (SGOT) U/L 22   ALT (SGPT) U/L 12     Results from last 7 days   Lab Units 12/16/20  0833 12/15/20  1041   CALCIUM mg/dL 8.1* 8.4*   ALBUMIN g/dL  --  3.50     Results from last 7 days   Lab Units 12/15/20  1105   LACTATE mmol/L 1.0     COVID19   Date Value Ref Range Status   12/15/2020 Not Detected Not Detected - Ref. Range Final     No results found for: HGBA1C, POCGLU    XR Chest 1 View  XR CHEST 1 VW-     Clinical: Shortness of breath     COMPARISON CT chest 05/01/2020 and chest radiograph 03/26/2020     FINDINGS: There is stable prominence of the mediastinum from prior  gastric pull-through. The right-sided pleural effusion has diminished in  size. Right rib deformities again noted. There is cardiac enlargement.  No pulmonary edema identified. No acute consolidation seen. There is  persistent right infrahilar atelectasis again demonstrated.     CONCLUSION: The right-sided pleural effusion has diminished in size,  small amount remains on the current examination. No edema or acute  airspace disease has developed, there is widening of the mediastinum  from previous gastric pull-through procedure. The remainder is  unremarkable.     This report was finalized on 12/15/2020 11:39 AM by Dr. Sarbjit Luis M.D.       Scheduled Medications  [START ON 12/17/2020] furosemide, 20 mg, Oral, Daily  hydrocortisone, 25 mg, Rectal, BID  metoprolol tartrate, 25 mg, Oral, BID  pantoprazole, 40 mg, Oral, BID AC  [START ON 12/17/2020] PARoxetine, 40 mg, Oral, QAM  [START ON 12/17/2020] potassium chloride, 10 mEq, Oral,  Daily  pramipexole, 1.5 mg, Oral, BID    Infusions   Diet  Diet Regular; Cardiac       Assessment/Plan     Active Hospital Problems    Diagnosis  POA   • Acute blood loss anemia [D62]  Unknown   • Pancytopenia (CMS/HCC) [D61.818]  Unknown   • Chronic venous hypertension due to DVT [I87.099]  Unknown   • COPD (chronic obstructive pulmonary disease) (CMS/HCC) [J44.9]  Unknown   • Gastrointestinal hemorrhage [K92.2]  Yes   • RLS (restless legs syndrome) [G25.81]  Yes      Resolved Hospital Problems   No resolved problems to display.       70 y.o. male admitted with bright red blood per rectum, likely lower GI bleed secondary to hemorrhoids.    · ABL anemia: Hemoglobin stable but still low.  We will continue to monitor every 8 hours and transfuse as needed.  Obviously hold Eliquis for now  · Lower GI bleed: No obvious source other than potentially hemorrhoids on endoscopy.  I will go ahead and consult colorectal surgery given the severity of this bleed  · Pancytopenia: White count and platelets also low, not sure of the etiology.  Recheck counts in the a.m.  · Reviewed and restarted his home meds as appropriate  · Chronic DVT with peripheral edema which patient says is unchanged.  · Elevated troponin noted-looked back at old labs it looks like he has had this before.  I will recheck another 1 Just to document stability.  Do not plan any additional work-up unless repeat troponin is higher  · SCDs for DVT prophylaxis.  · Full code.        Albert Pleitez MD  [facility]  12/16/20  14:29 EST    I wore protective equipment throughout this patient encounter including a face mask, gloves and protective eyewear.  Hand hygiene was performed before donning protective equipment and after removal when leaving the room.

## 2020-12-16 NOTE — NURSING NOTE
Spoke with Dr. Pleitez, aware troponin 0.067. No new orders rec'd regarding troponin. MD states ok to consult Dr. Rivas instead of Dr. Garcia r/t Rachel at Dr. Garcia's office stated MD unavailable the rest of today and tomorrow. States Dr. Rivas on call for Dr. Garcia.

## 2020-12-16 NOTE — H&P
HISTORY AND PHYSICAL   [facility]        Patient Identification:  Name: Jose Hurtado  Age: 70 y.o.  Sex: male  :  1950  MRN: [id number]                     Primary Care Physician: Brandin Bolton MD    Chief Complaint:  70 year old gentleman who presented to the emergency room with dyspnea on exertion which started two days ago; he has also noted blood in his stool; shortness of air resolved with rest; he is on eliquis due to a history of dvt and antiphospholipid syndrome    History of Present Illness:   As above    Past Medical History:  Past Medical History:   Diagnosis Date   • Acute deep vein thrombosis (DVT) of popliteal vein of left lower extremity (CMS/HCC) 2020   • Anemia    • Anti-phospholipid syndrome (CMS/HCC)     On lifelong anticoagulation therapy   • Bladder disorder     LEAKAGE   ON MED  wers pads   • Community acquired pneumonia     HISTORY OF IN    • Deep venous thrombosis (CMS/HCC) ,     Left lower extremity multiple   • Depression    • Esophageal carcinoma (CMS/HCC) 2014    had chemo and radiation prior surgery   • Hemorrhoids    • HH (hiatus hernia)    • History of atrial fibrillation 2015    ONE EPISODE WHILE HOSPITALIZED   • History of kidney stones    • History of nephrolithiasis    • History of pancreatitis     PT STATES MANY YEARS AGO   • History of radiation therapy    • Hypertension    • Long-term (current) use of anticoagulants, INR goal 2.0-3.0    • Lymphedema    • Malignant neoplasm of prostate (CMS/HCC)    • Other hyperlipidemia 2018 lipid panel risk 12.8%   • Restless legs syndrome    • Shortness of breath    • Squamous carcinoma     on the head     Past Surgical History:  Past Surgical History:   Procedure Laterality Date   • APPENDECTOMY     • BRONCHOSCOPY      (Diagnostic)   • CATARACT EXTRACTION Bilateral    • COLONOSCOPY  12/15/2014    Complete / Description: EH, IH, torts, stool, follow-up colonoscopy  due in 5 years.   • COLONOSCOPY N/A 6/13/2017    non-thrombosed external hemorrhoids, normal examined ileum, IH   • COLONOSCOPY N/A 2/26/2019    Procedure: COLONOSCOPY with Cold Polypectomy;  Surgeon: Mulugeta Millan MD;  Location: [facility] ENDOSCOPY;  Service: Gastroenterology   • CYSTOSCOPY W/ LASER LITHOTRIPSY     • ENDOSCOPY N/A 6/13/2017    Procedure: ESOPHAGOGASTRODUODENOSCOPY WITH COLD BIOPSY;  Surgeon: Lake Gonzalez MD;  Location: [facility] ENDOSCOPY;  Service:    • ESOPHAGECTOMY      April 2015, stage IIB esophageal carcinoma, sub-total resection.   • ESOPHAGECTOMY      Esophagectomy Subtotal Andrea Joe Procedure   • EXCISION LESION  08/2012    Removal of Squamous Cell CA on Head   • HAMMER TOE REPAIR  09/2014    Hammertoe Operation (Each Toe), 10/2014   • HAMMER TOE REPAIR Left 10/3/2017    Procedure: Left second third and fourth distal interphalangeal joint resection with flexor tenotomy;  Surgeon: Mulugeta Lira MD;  Location: [facility] OR OSC;  Service:    • HERNIA REPAIR      incisional   • JEJUNOSTOMY      Laparoscopic   • JEJUNOSTOMY      tube removal    • KNEE SURGERY Bilateral 1967, 1973, 1981   • PATELLA SURGERY Left     removed   • PILONIDAL CYST / SINUS EXCISION     • LA TOTAL KNEE ARTHROPLASTY Right 3/26/2018    Procedure: TOTAL KNEE ARTHROPLASTY;  Surgeon: Renny Solis MD;  Location: [facility] MAIN OR;  Service: Orthopedics   • PROSTATECTOMY  2010   • PYLOROPLASTY     • SPINAL FUSION  02/1998    C 5,6   • TONSILLECTOMY  1955   • TONSILLECTOMY     • UPPER GASTROINTESTINAL ENDOSCOPY  12/15/2014    LA Grade D esophagitis, Pardo's, HH, multiple duodenal ulcers   • VENTRAL/INCISIONAL HERNIA REPAIR N/A 4/14/2016    Procedure: VENTRAL/INCISIONAL HERNIA REPAIR, open, with mesh, and component separation;  Surgeon: Darren Rivas MD;  Location: [facility] MAIN OR;  Service:       Home Meds:  Facility-Administered Medications Prior to Admission   Medication Dose Route Frequency Provider  Last Rate Last Admin   • cyanocobalamin injection 1,000 mcg  1,000 mcg Intramuscular Q28 Days Brandin Bolton MD   1,000 mcg at 11/23/20 0948     Medications Prior to Admission   Medication Sig Dispense Refill Last Dose   • albuterol sulfate  (90 Base) MCG/ACT inhaler Inhale 1 puff Every 4 (Four) Hours As Needed for Wheezing.      • Eliquis 5 MG tablet tablet TAKE ONE TABLET BY MOUTH EVERY 12 HOURS 60 tablet 2    • furosemide (LASIX) 20 MG tablet TAKE ONE TABLET BY MOUTH DAILY 30 tablet 11    • metoprolol tartrate (LOPRESSOR) 25 MG tablet Take 1 tablet by mouth 2 (Two) Times a Day. 180 tablet 3    • pantoprazole (PROTONIX) 40 MG EC tablet Take 1 tablet by mouth 2 (Two) Times a Day Before Meals. 180 tablet 3    • PARoxetine (PAXIL) 40 MG tablet TAKE ONE TABLET BY MOUTH EVERY MORNING 30 tablet 5    • potassium chloride (MICRO-K) 10 MEQ CR capsule Take 1 capsule by mouth Daily. 30 capsule 3    • pramipexole (MIRAPEX) 1.5 MG tablet TAKE ONE TABLET BY MOUTH TWICE A  tablet 1    • STIOLTO RESPIMAT 2.5-2.5 MCG/ACT aerosol solution inhaler Inhale 2 puffs Daily. (Patient taking differently: Inhale 2 puffs Daily. prn)          Allergies:  No Known Allergies  Immunizations:  Immunization History   Administered Date(s) Administered   • DTaP 01/02/2007, 12/02/2007   • Flu Mist 09/01/2015   • Fluzone High Dose =>65 Years ([employer] ONLY) 09/13/2018, 09/24/2019, 09/02/2020   • Hepatitis A 09/13/2018, 10/30/2019   • Influenza TIV (IM) 10/02/2011   • PEDS-Pneumococcal Conjugate (PCV7) 02/01/2013   • Pneumococcal Conjugate 13-Valent (PCV13) 08/15/2016   • Pneumococcal Polysaccharide (PPSV23) 07/27/2020   • Pneumococcal, Unspecified 02/01/2013   • Shingrix 03/27/2019, 10/12/2019   • Td 04/12/2018   • Tdap 02/01/2008   • Zostavax 08/01/2013, 03/27/2019, 10/12/2019     Social History:   Social History     Social History Narrative    self-employed      Social History     Socioeconomic History   • Marital status:       Spouse name: Lena   • Number of children: 0   • Years of education: College   • Highest education level: Not on file   Occupational History   • Occupation: Retired      Comment: Retired    Tobacco Use   • Smoking status: Former Smoker     Packs/day: 2.00     Years: 3.00     Pack years: 6.00     Types: Cigarettes     Quit date:      Years since quittin.9   • Smokeless tobacco: Never Used   • Tobacco comment: no caffeine    Substance and Sexual Activity   • Alcohol use: Yes     Frequency: 2-3 times a week     Comment: 2-3 x per week   • Drug use: No   • Sexual activity: Defer   Social History Narrative    self-employed        Family History:  Family History   Problem Relation Age of Onset   • Cerebral aneurysm Mother         cerebral artery aneurysm ( age 56)   • Prostate cancer Brother 68   • Anxiety disorder Father    • Suicide Attempts Father          of suicide   • Cancer Father         bladder   • No Known Problems Brother    • Pancreatic cancer Nephew    • Colon cancer Neg Hx    • Esophageal cancer Neg Hx    • Dementia Neg Hx         Review of Systems  See history of present illness and past medical history.  Patient denies headache, dizziness, syncope, falls, trauma, change in vision, change in hearing, change in taste, changes in weight, changes in appetite, focal weakness, numbness, or paresthesia.  Patient denies chest pain, palpitations,  orthopnea, PND, cough, sinus pressure, rhinorrhea, epistaxis, hemoptysis, nausea, vomiting,hematemesis, diarrhea, constipation   Denies cold or heat intolerance, polydipsia, polyuria, polyphagia. Denies hematuria, pyuria, dysuria, hesitancy, frequency or urgency. Denies consumption of raw and under cooked meats foods or change in water source.  Denies fever, chills, sweats, night sweats.  Denies missing any routine medications. Remainder of ROS is negative.    Objective:  T Max 24 hrs: Temp (24hrs), Av.8 °F (36.6  "°C), Min:96.5 °F (35.8 °C), Max:98.6 °F (37 °C)    Vitals Ranges:   Temp:  [96.5 °F (35.8 °C)-98.6 °F (37 °C)] 98 °F (36.7 °C)  Heart Rate:  [61-90] 70  Resp:  [16-18] 18  BP: (102-140)/(50-69) 140/65      Exam:  /65 (BP Location: Right arm, Patient Position: Lying)   Pulse 70   Temp 98 °F (36.7 °C) (Oral)   Resp 18   Ht 195.6 cm (77\")   Wt 95.3 kg (210 lb)   SpO2 98%   BMI 24.90 kg/m²     General Appearance:    Alert, cooperative, no distress, appears stated age   Head:    Normocephalic, without obvious abnormality, atraumatic   Eyes:    PERRL, conjunctivae/corneas clear, EOM's intact, both eyes   Ears:    Normal external ear canals, both ears   Nose:   Nares normal, septum midline, mucosa normal, no drainage    or sinus tenderness   Throat:   Lips, mucosa, and tongue normal   Neck:   Supple, symmetrical, trachea midline, no adenopathy;     thyroid:  no enlargement/tenderness/nodules; no carotid    bruit or JVD   Back:     Symmetric, no curvature, ROM normal, no CVA tenderness   Lungs:     Decreased breath sounds bilaterally, respirations unlabored   Chest Wall:    No tenderness or deformity    Heart:    Regular rate and rhythm, S1 and S2 normal, no murmur, rub   or gallop   Abdomen:     Soft, nontender, bowel sounds active all four quadrants,     no masses, no hepatomegaly, no splenomegaly   Extremities:   Extremities normal, atraumatic, no cyanosis or edema   Pulses:   2+ and symmetric all extremities   Skin:   Skin color, texture, turgor normal, no rashes or lesions   Lymph nodes:   Cervical, supraclavicular, and axillary nodes normal   Neurologic:   CNII-XII intact, normal strength, sensation intact throughout      .    Data Review:  Labs in chart were reviewed.  WBC   Date Value Ref Range Status   12/15/2020 3.17 (L) 3.40 - 10.80 10*3/mm3 Final     Hemoglobin   Date Value Ref Range Status   12/15/2020 7.1 (L) 13.0 - 17.7 g/dL Final     Hematocrit   Date Value Ref Range Status   12/15/2020 21.0 (L) " 37.5 - 51.0 % Final     Platelets   Date Value Ref Range Status   12/15/2020 163 140 - 450 10*3/mm3 Final     Sodium   Date Value Ref Range Status   12/15/2020 141 136 - 145 mmol/L Final     Potassium   Date Value Ref Range Status   12/15/2020 4.1 3.5 - 5.2 mmol/L Final     Chloride   Date Value Ref Range Status   12/15/2020 112 (H) 98 - 107 mmol/L Final     CO2   Date Value Ref Range Status   12/15/2020 22.0 22.0 - 29.0 mmol/L Final     BUN   Date Value Ref Range Status   12/15/2020 19 8 - 23 mg/dL Final     Creatinine   Date Value Ref Range Status   12/15/2020 1.19 0.76 - 1.27 mg/dL Final     Glucose   Date Value Ref Range Status   12/15/2020 122 (H) 65 - 99 mg/dL Final     Calcium   Date Value Ref Range Status   12/15/2020 8.4 (L) 8.6 - 10.5 mg/dL Final     AST (SGOT)   Date Value Ref Range Status   12/15/2020 22 1 - 40 U/L Final     ALT (SGPT)   Date Value Ref Range Status   12/15/2020 12 1 - 41 U/L Final     Alkaline Phosphatase   Date Value Ref Range Status   12/15/2020 89 39 - 117 U/L Final     No results found for: APTT, INR             Imaging Results (All)     Procedure Component Value Units Date/Time    XR Chest 1 View [[id number]] Collected: 12/15/20 1137     Updated: 12/15/20 1142    Narrative:      XR CHEST 1 VW-     Clinical: Shortness of breath     COMPARISON CT chest 05/01/2020 and chest radiograph 03/26/2020     FINDINGS: There is stable prominence of the mediastinum from prior  gastric pull-through. The right-sided pleural effusion has diminished in  size. Right rib deformities again noted. There is cardiac enlargement.  No pulmonary edema identified. No acute consolidation seen. There is  persistent right infrahilar atelectasis again demonstrated.     CONCLUSION: The right-sided pleural effusion has diminished in size,  small amount remains on the current examination. No edema or acute  airspace disease has developed, there is widening of the mediastinum  from previous gastric pull-through  procedure. The remainder is  unremarkable.     This report was finalized on 12/15/2020 11:39 AM by Dr. Sarbjit Luis M.D.           Patient Active Problem List   Diagnosis Code   • Adenocarcinoma of esophagus (CMS/HCC) C15.9   • Adenomatous polyp of colon D12.6   • Malignant neoplasm of prostate (CMS/HCC) C61   • RLS (restless legs syndrome) G25.81   • Recurrent major depressive disorder, in partial remission (CMS/HCC) F33.41   • Hypertension I10   • Anemia D64.9   • Insomnia due to other mental disorder F51.05, F99   • Peripheral polyneuropathy G62.9   • B12 deficiency E53.8   • Postsurgical malabsorption K91.2   • Lymphedema I89.0   • Iron deficiency E61.1   • Gastroparesis K31.84   • Acute deep vein thrombosis (DVT) of popliteal vein of left lower extremity (CMS/HCC) I82.432   • Tremor of both hands R25.1   • Gastrointestinal hemorrhage K92.2       Assessment:    Gastrointestinal hemorrhage  acute blood loss anema  Dyspnea on exertion  H.o dvt  Antiphospholipid syndrome    Plan:  Will hold eliquis  Colonoscopy is planned  Transfusion of prbcs is in progress  Expect that dyspnea will improve  D.w patient and ED provider    Darby Orozco MD  12/15/2020  20:58 EST

## 2020-12-16 NOTE — ANESTHESIA POSTPROCEDURE EVALUATION
Patient: Jose Hurtado    Procedure Summary     Date: 12/16/20 Room / Location:  [facility]    Anesthesia Start: 1111 Anesthesia Stop: 1145    Procedure: COLONOSCOPY to cecum into TI (N/A ) Diagnosis:       Gastrointestinal hemorrhage, unspecified gastrointestinal hemorrhage type      (Gastrointestinal hemorrhage, unspecified gastrointestinal hemorrhage type [K92.2])    Surgeon: Mesha Sanchez MD Provider: Abilio Bryson MD    Anesthesia Type: MAC ASA Status: 3          Anesthesia Type: MAC    Vitals  Vitals Value Taken Time   /59 12/16/20 1203   Temp 36.8 °C (98.3 °F) 12/16/20 1203   Pulse 64 12/16/20 1203   Resp 16 12/16/20 1203   SpO2 100 % 12/16/20 1203           Post Anesthesia Care and Evaluation    Patient location during evaluation: bedside  Patient participation: complete - patient participated  Level of consciousness: awake  Pain score: 1  Pain management: adequate  Airway patency: patent  Anesthetic complications: No anesthetic complications  PONV Status: none  Cardiovascular status: acceptable  Respiratory status: acceptable  Hydration status: acceptable

## 2020-12-17 ENCOUNTER — DOCUMENTATION (OUTPATIENT)
Dept: ONCOLOGY | Facility: CLINIC | Age: 70
End: 2020-12-17

## 2020-12-17 ENCOUNTER — TELEPHONE (OUTPATIENT)
Dept: ONCOLOGY | Facility: CLINIC | Age: 70
End: 2020-12-17

## 2020-12-17 PROBLEM — K64.8 OTHER HEMORRHOIDS: Status: ACTIVE | Noted: 2020-12-17

## 2020-12-17 LAB
DEPRECATED RDW RBC AUTO: 47 FL (ref 37–54)
ERYTHROCYTE [DISTWIDTH] IN BLOOD BY AUTOMATED COUNT: 14.1 % (ref 12.3–15.4)
HCT VFR BLD AUTO: 21.3 % (ref 37.5–51)
HCT VFR BLD AUTO: 27.2 % (ref 37.5–51)
HGB BLD-MCNC: 7.1 G/DL (ref 13–17.7)
HGB BLD-MCNC: 8.8 G/DL (ref 13–17.7)
MCH RBC QN AUTO: 30.9 PG (ref 26.6–33)
MCHC RBC AUTO-ENTMCNC: 33.3 G/DL (ref 31.5–35.7)
MCV RBC AUTO: 92.6 FL (ref 79–97)
PLATELET # BLD AUTO: 106 10*3/MM3 (ref 140–450)
PMV BLD AUTO: 9.6 FL (ref 6–12)
RBC # BLD AUTO: 2.3 10*6/MM3 (ref 4.14–5.8)
WBC # BLD AUTO: 3.19 10*3/MM3 (ref 3.4–10.8)

## 2020-12-17 PROCEDURE — 86923 COMPATIBILITY TEST ELECTRIC: CPT

## 2020-12-17 PROCEDURE — 86900 BLOOD TYPING SEROLOGIC ABO: CPT

## 2020-12-17 PROCEDURE — 85018 HEMOGLOBIN: CPT | Performed by: INTERNAL MEDICINE

## 2020-12-17 PROCEDURE — 99232 SBSQ HOSP IP/OBS MODERATE 35: CPT | Performed by: INTERNAL MEDICINE

## 2020-12-17 PROCEDURE — 36430 TRANSFUSION BLD/BLD COMPNT: CPT

## 2020-12-17 PROCEDURE — 99221 1ST HOSP IP/OBS SF/LOW 40: CPT | Performed by: SURGERY

## 2020-12-17 PROCEDURE — P9016 RBC LEUKOCYTES REDUCED: HCPCS

## 2020-12-17 PROCEDURE — 85027 COMPLETE CBC AUTOMATED: CPT | Performed by: HOSPITALIST

## 2020-12-17 PROCEDURE — 85014 HEMATOCRIT: CPT | Performed by: INTERNAL MEDICINE

## 2020-12-17 PROCEDURE — 99222 1ST HOSP IP/OBS MODERATE 55: CPT | Performed by: INTERNAL MEDICINE

## 2020-12-17 PROCEDURE — 25010000002 IRON SUCROSE PER 1 MG: Performed by: INTERNAL MEDICINE

## 2020-12-17 RX ORDER — FERROUS SULFATE 325(65) MG
325 TABLET ORAL 2 TIMES DAILY WITH MEALS
Status: DISCONTINUED | OUTPATIENT
Start: 2020-12-17 | End: 2020-12-18 | Stop reason: HOSPADM

## 2020-12-17 RX ORDER — ACETAMINOPHEN 325 MG/1
650 TABLET ORAL EVERY 6 HOURS PRN
Status: DISCONTINUED | OUTPATIENT
Start: 2020-12-17 | End: 2020-12-18 | Stop reason: HOSPADM

## 2020-12-17 RX ORDER — POTASSIUM CHLORIDE 750 MG/1
10 TABLET, FILM COATED, EXTENDED RELEASE ORAL DAILY
Status: DISCONTINUED | OUTPATIENT
Start: 2020-12-17 | End: 2020-12-18 | Stop reason: HOSPADM

## 2020-12-17 RX ADMIN — PRAMIPEXOLE DIHYDROCHLORIDE 1.5 MG: 1.5 TABLET ORAL at 20:30

## 2020-12-17 RX ADMIN — HYDROCORTISONE ACETATE 25 MG: 25 SUPPOSITORY RECTAL at 20:30

## 2020-12-17 RX ADMIN — PANTOPRAZOLE SODIUM 40 MG: 40 TABLET, DELAYED RELEASE ORAL at 06:37

## 2020-12-17 RX ADMIN — PANTOPRAZOLE SODIUM 40 MG: 40 TABLET, DELAYED RELEASE ORAL at 17:09

## 2020-12-17 RX ADMIN — PRAMIPEXOLE DIHYDROCHLORIDE 1.5 MG: 1.5 TABLET ORAL at 08:44

## 2020-12-17 RX ADMIN — POTASSIUM CHLORIDE 10 MEQ: 750 TABLET, EXTENDED RELEASE ORAL at 08:44

## 2020-12-17 RX ADMIN — PAROXETINE HYDROCHLORIDE HEMIHYDRATE 40 MG: 20 TABLET, FILM COATED ORAL at 06:37

## 2020-12-17 RX ADMIN — METOPROLOL TARTRATE 25 MG: 25 TABLET, FILM COATED ORAL at 08:44

## 2020-12-17 RX ADMIN — DOCUSATE SODIUM 100 MG: 100 CAPSULE ORAL at 08:44

## 2020-12-17 RX ADMIN — METOPROLOL TARTRATE 25 MG: 25 TABLET, FILM COATED ORAL at 20:30

## 2020-12-17 RX ADMIN — FERROUS SULFATE TAB 325 MG (65 MG ELEMENTAL FE) 325 MG: 325 (65 FE) TAB at 11:53

## 2020-12-17 RX ADMIN — FUROSEMIDE 20 MG: 20 TABLET ORAL at 08:44

## 2020-12-17 RX ADMIN — FERROUS SULFATE TAB 325 MG (65 MG ELEMENTAL FE) 325 MG: 325 (65 FE) TAB at 17:09

## 2020-12-17 RX ADMIN — HYDROCORTISONE ACETATE 25 MG: 25 SUPPOSITORY RECTAL at 08:44

## 2020-12-17 RX ADMIN — IRON SUCROSE 300 MG: 20 INJECTION, SOLUTION INTRAVENOUS at 17:09

## 2020-12-17 NOTE — PLAN OF CARE
Goal Outcome Evaluation:  Plan of Care Reviewed With: patient  Progress: no change  Outcome Summary: VSS, no c/o pain, + ambulation to BR, H&H's, will continue to monitor

## 2020-12-17 NOTE — CONSULTS
[facility] INITIAL INPATIENT CONSULTATION NOTE    REASON FOR CONSULTATION:    GI bleed and eliquis on hold     HISTORY OF PRESENT ILLNESS:  Jose Hurtado is a 70 y.o. male who we are asked to see today in consultation forGI bleed and eliquis on hold  .    Past medical history of DVT, anemia, antiphospholipid syndrome, pneumonia, esophageal carcinoma, radiation therapy, and hyperlipidemia.     Presented with shortness of breath and noticed blood in his stool on 12/1512020, the day of admission.  On admission Hgb was 7.1. Received 2 units of PRBCs.  Patient on Eliquis for previous DVT and antiphospholipid syndrome.  He had a colonoscopy on 12/16/2020 that indicated he had internal hemorrhoids and that was the bleeding source.   Eliquis is being held for one week per GI, and current HGB is 7.1.    The patient was admitted for GI hemorrhage, antiphospholipid syndrome with plans to hold Eliquis, transfusion planned as subsequent colonoscopy.  Again evidence of hemorrhoidal bleeding as the primary source.  The patient states that this has been going on for several months.    Past Medical History:   Diagnosis Date   • Acute deep vein thrombosis (DVT) of popliteal vein of left lower extremity (CMS/HCC) 03/24/2020   • Anemia    • Anti-phospholipid syndrome (CMS/HCC)     On lifelong anticoagulation therapy   • Bladder disorder     LEAKAGE   ON MED  wers pads   • Community acquired pneumonia     HISTORY OF IN 2014   • Deep venous thrombosis (CMS/HCC) 2006, 2008    Left lower extremity multiple   • Depression    • Esophageal carcinoma (CMS/HCC) 12/31/2014    had chemo and radiation prior surgery   • Hemorrhoids    • HH (hiatus hernia)    • History of atrial fibrillation 2015    ONE EPISODE WHILE HOSPITALIZED   • History of kidney stones    • History of nephrolithiasis    • History of pancreatitis     PT STATES MANY YEARS AGO   • History of radiation therapy    • Hypertension    • Long-term (current) use of  anticoagulants, INR goal 2.0-3.0    • Lymphedema    • Malignant neoplasm of prostate (CMS/HCC)    • Other hyperlipidemia 1/30/2018 January 30, 2018 lipid panel risk 12.8%   • Restless legs syndrome    • Shortness of breath    • Squamous carcinoma     on the head       Past Surgical History:   Procedure Laterality Date   • APPENDECTOMY  1950   • BRONCHOSCOPY      (Diagnostic)   • CATARACT EXTRACTION Bilateral 2014   • COLONOSCOPY  12/15/2014    Complete / Description: EH, IH, torts, stool, follow-up colonoscopy due in 5 years.   • COLONOSCOPY N/A 6/13/2017    non-thrombosed external hemorrhoids, normal examined ileum, IH   • COLONOSCOPY N/A 2/26/2019    Procedure: COLONOSCOPY with Cold Polypectomy;  Surgeon: Mulugeta Millan MD;  Location: [facility] ENDOSCOPY;  Service: Gastroenterology   • COLONOSCOPY N/A 12/16/2020    Procedure: COLONOSCOPY to cecum into TI;  Surgeon: Mesha Sanchez MD;  [facility] END[facility];  Service: Gastroenterology;  Laterality: N/A;  pre: lower GI bleed  post: hemorrhoids    • CYSTOSCOPY W/ LASER LITHOTRIPSY     • ENDOSCOPY N/A 6/13/2017    Procedure: ESOPHAGOGASTRODUODENOSCOPY WITH COLD BIOPSY;  Surgeon: Lake Gonzalez MD;  Location: [facility] ENDOSCOPY;  Service:    • ESOPHAGECTOMY      April 2015, stage IIB esophageal carcinoma, sub-total resection.   • ESOPHAGECTOMY      Esophagectomy Subtotal Andrea Joe Procedure   • EXCISION LESION  08/2012    Removal of Squamous Cell CA on Head   • HAMMER TOE REPAIR  09/2014    Hammertoe Operation (Each Toe), 10/2014   • HAMMER TOE REPAIR Left 10/3/2017    Procedure: Left second third and fourth distal interphalangeal joint resection with flexor tenotomy;  Surgeon: Mulugeta Lira MD;  Location:  [facility] [place];  Service:    • HERNIA REPAIR      incisional   • JEJUNOSTOMY      Laparoscopic   • JEJUNOSTOMY      tube removal    • KNEE SURGERY Bilateral 1967, 1973, 1981   • PATELLA SURGERY Left     removed   • PILONIDAL CYST / SINUS  EXCISION     • HI TOTAL KNEE ARTHROPLASTY Right 3/26/2018    Procedure: TOTAL KNEE ARTHROPLASTY;  Surgeon: Renny Solis MD;  Location: [facility];  Service: Orthopedics   • PROSTATECTOMY  2010   • PYLOROPLASTY     • SPINAL FUSION  02/1998    C 5,6   • TONSILLECTOMY  1955   • TONSILLECTOMY     • UPPER GASTROINTESTINAL ENDOSCOPY  12/15/2014    LA Grade D esophagitis, Pardo's, HH, multiple duodenal ulcers   • VENTRAL/INCISIONAL HERNIA REPAIR N/A 4/14/2016    Procedure: VENTRAL/INCISIONAL HERNIA REPAIR, open, with mesh, and component separation;  Surgeon: Darren Rivas MD;  Location: [facility];  Service:        SOCIAL HISTORY:   reports that he quit smoking about 46 years ago. His smoking use included cigarettes. He has a 6.00 pack-year smoking history. He has never used smokeless tobacco. He reports current alcohol use. He reports that he does not use drugs.    FAMILY HISTORY:  family history includes Anxiety disorder in his father; Cancer in his father; Cerebral aneurysm in his mother; No Known Problems in his brother; Pancreatic cancer in his nephew; Prostate cancer (age of onset: 68) in his brother; Suicide Attempts in his father.    ALLERGIES:  No Known Allergies    MEDICATIONS:  As listed in the electronic medical record.    Review of Systems    Vitals:    12/16/20 1918 12/16/20 2325 12/17/20 0742 12/17/20 1151   BP: 120/65 124/63 137/70 124/65   BP Location: Right arm Right arm Right arm Right arm   Patient Position: Lying Lying Sitting Lying   Pulse: 69 73 67 62   Resp: 18 18 18 18   Temp: 98.5 °F (36.9 °C) 98.3 °F (36.8 °C) 97.7 °F (36.5 °C) 97.8 °F (36.6 °C)   TempSrc: Oral Oral Oral Oral   SpO2: 99% 96% 97%    Weight:       Height:           Physical Exam  Constitutional:       Appearance: Normal appearance. He is normal weight.   HENT:      Head: Normocephalic and atraumatic.      Mouth/Throat:      Mouth: Mucous membranes are moist.      Pharynx: Oropharynx is clear.      Comments:  Edentulous  Neck:      Musculoskeletal: Normal range of motion and neck supple.   Cardiovascular:      Rate and Rhythm: Normal rate and regular rhythm.      Pulses: Normal pulses.      Heart sounds: Normal heart sounds.   Pulmonary:      Effort: Pulmonary effort is normal.      Breath sounds: Normal breath sounds.   Abdominal:      General: Abdomen is flat. Bowel sounds are normal.   Musculoskeletal: Normal range of motion.      Right lower leg: Edema present.      Left lower leg: Edema present.   Skin:     General: Skin is warm and dry.   Neurological:      General: No focal deficit present.      Mental Status: He is alert. Mental status is at baseline.         DIAGNOSTIC DATA:  WBC   Date Value Ref Range Status   12/17/2020 3.19 (L) 3.40 - 10.80 10*3/mm3 Final     RBC   Date Value Ref Range Status   12/17/2020 2.30 (L) 4.14 - 5.80 10*6/mm3 Final     Hemoglobin   Date Value Ref Range Status   12/17/2020 7.1 (L) 13.0 - 17.7 g/dL Final     Hematocrit   Date Value Ref Range Status   12/17/2020 21.3 (L) 37.5 - 51.0 % Final     MCV   Date Value Ref Range Status   12/17/2020 92.6 79.0 - 97.0 fL Final     MCH   Date Value Ref Range Status   12/17/2020 30.9 26.6 - 33.0 pg Final     MCHC   Date Value Ref Range Status   12/17/2020 33.3 31.5 - 35.7 g/dL Final     RDW   Date Value Ref Range Status   12/17/2020 14.1 12.3 - 15.4 % Final     RDW-SD   Date Value Ref Range Status   12/17/2020 47.0 37.0 - 54.0 fl Final     MPV   Date Value Ref Range Status   12/17/2020 9.6 6.0 - 12.0 fL Final     Platelets   Date Value Ref Range Status   12/17/2020 106 (L) 140 - 450 10*3/mm3 Final     Neutrophil %   Date Value Ref Range Status   12/16/2020 58.1 42.7 - 76.0 % Final     Lymphocyte %   Date Value Ref Range Status   12/16/2020 25.1 19.6 - 45.3 % Final     Monocyte %   Date Value Ref Range Status   12/16/2020 10.0 5.0 - 12.0 % Final     Eosinophil %   Date Value Ref Range Status   12/16/2020 5.5 0.3 - 6.2 % Final     Basophil %   Date  Value Ref Range Status   12/16/2020 1.0 0.0 - 1.5 % Final     Immature Grans %   Date Value Ref Range Status   12/16/2020 0.3 0.0 - 0.5 % Final     Neutrophils, Absolute   Date Value Ref Range Status   12/16/2020 1.81 1.70 - 7.00 10*3/mm3 Final     Lymphocytes, Absolute   Date Value Ref Range Status   12/16/2020 0.78 0.70 - 3.10 10*3/mm3 Final     Monocytes, Absolute   Date Value Ref Range Status   12/16/2020 0.31 0.10 - 0.90 10*3/mm3 Final     Eosinophils, Absolute   Date Value Ref Range Status   12/16/2020 0.17 0.00 - 0.40 10*3/mm3 Final     Basophils, Absolute   Date Value Ref Range Status   12/16/2020 0.03 0.00 - 0.20 10*3/mm3 Final     Immature Grans, Absolute   Date Value Ref Range Status   12/16/2020 0.01 0.00 - 0.05 10*3/mm3 Final     nRBC   Date Value Ref Range Status   12/16/2020 0.0 0.0 - 0.2 /100 WBC Final       Glucose   Date Value Ref Range Status   12/16/2020 85 65 - 99 mg/dL Final     Sodium   Date Value Ref Range Status   12/16/2020 139 136 - 145 mmol/L Final     Potassium   Date Value Ref Range Status   12/16/2020 4.1 3.5 - 5.2 mmol/L Final     CO2   Date Value Ref Range Status   12/16/2020 21.8 (L) 22.0 - 29.0 mmol/L Final     Chloride   Date Value Ref Range Status   12/16/2020 110 (H) 98 - 107 mmol/L Final     Anion Gap   Date Value Ref Range Status   12/16/2020 7.2 5.0 - 15.0 mmol/L Final     Creatinine   Date Value Ref Range Status   12/16/2020 1.03 0.76 - 1.27 mg/dL Final     BUN   Date Value Ref Range Status   12/16/2020 14 8 - 23 mg/dL Final     BUN/Creatinine Ratio   Date Value Ref Range Status   12/16/2020 13.6 7.0 - 25.0 Final     Calcium   Date Value Ref Range Status   12/16/2020 8.1 (L) 8.6 - 10.5 mg/dL Final     eGFR Non  Amer   Date Value Ref Range Status   12/16/2020 71 >60 mL/min/1.73 Final     Alkaline Phosphatase   Date Value Ref Range Status   12/15/2020 89 39 - 117 U/L Final     Total Protein   Date Value Ref Range Status   12/15/2020 6.0 6.0 - 8.5 g/dL Final     ALT  (SGPT)   Date Value Ref Range Status   12/15/2020 12 1 - 41 U/L Final     AST (SGOT)   Date Value Ref Range Status   12/15/2020 22 1 - 40 U/L Final     Total Bilirubin   Date Value Ref Range Status   12/15/2020 0.2 0.0 - 1.2 mg/dL Final     Albumin   Date Value Ref Range Status   12/15/2020 3.50 3.50 - 5.20 g/dL Final     Globulin   Date Value Ref Range Status   12/15/2020 2.5 gm/dL Final         IMAGING:    XR Chest 1 View 12.15.2020  CONCLUSION: The right-sided pleural effusion has diminished in size,  small amount remains on the current examination. No edema or acute  airspace disease has developed, there is widening of the mediastinum  from previous gastric pull-through procedure. The remainder is  unremarkable.      Assessment/Plan   This is a 70 y.o. male with:    *Esophageal adenocarcinoma. Initially T2N0M0. After neoadjuvant carboplatin/Taxol with radiation, achieved a pathologic CR at resection, 4/24/2015.   · As per NCCN guidelines, plan CAT scans every 6 months x1 year, then every 6 to 9 months on years 2 and years 3. Defer any surveillance EGDs to Dr. Gonzalez if he feels they are appropriate.   · Also as per NCCN guidelines, plan H and P every 3 to 6 months on years 1 and years 2, then every 6 to 12 months' time on years 3 through years 5 and then annually.   · EGD 6/13/17 by Dr. Gonzalez: No evidence of recurrence.  · CT scan 3/2/18 (this completed 3 years of surveillance CT): No evidence of recurrence.  Plan no more surveillance CT.  No signs of recurrence.  Remains in remission.     *Recurrent DVT, prior to cancer diagnosis.    · Dr. Bolton manages his Coumadin.   · Chronic left leg larger than right, since DVT  No signs of recurrent clots.     *Leukocytopenia, anemia, thrombocytopenia.   ·  He had a white blood cell count in the upper 3s and a hemoglobin in the upper 12s with a normal platelet count prior to beginning chemotherapy. We will monitor this.   WBC 4.1     *Persistent cough.  He has seen   Nika Sayied for this.    He did not complain of this today.     *Previously complained of emesis occurring 5 hours after eating on average once every month or 2.  No nausea otherwise.  Denies dysphagia or odynophagia.    Unchanged.  Occurring on average once every couple of months.  He did not complain of this today     *Iron deficiency anemia  · Hb mostly around 11  · On 4/15/2019, ferritin 29.7, 13% saturation.  Serum folate normal.  · On oral iron daily through PCP.  · On 8/23/19, ferritin 65, 14%.   · Increased oral iron.   · On 10/15/19, ferritin 72, 10%.  · On 10/25/2019, stopped oral iron since it was not helping.    ? 2 doses Injectafer.  · On 12/13/2019, ferritin 708, 15% saturation, Hb 10.4.  Therefore, no IV iron.  Monitor.  · Admission [place] 15, 2020 with ferritin 39.9, iron profile with iron of 53, iron saturation 17%, additional IV iron plan.      Recheck anemia labs today due to persistent anemia.     *Source of iron deficiency.  · Last colonoscopy 2/26/2019 by Dr. Millan.  Last EGD 6/13/2017 by Dr. Gonzalez.  · Suspect he has an absorption issue due to previous esophageal surgery.     *B12 deficiency.  · On 6/6/2019, B12 <150  · On B12 injections through PCP.       *Patient admitted December 15, 2020 with apparent gastrointestinal hemorrhage with blood in his stool.  · Transfusion planned  · GI assessment with hemorrhoidal bleeding recognized on colonoscopy.  · General surgery consultation with plans for additional transfusion, likely discharge off Eliquis, and suppositories twice a day and determine as an outpatient for hemorrhoidectomy.        RECOMMENDATIONS/PLAN:   1. Continue to hold Eliquis  2. Previous iron profile December 15 with iron of 53, 17% saturation, TIBC 311, ferritin of 39.9  3. Proceed with IV iron during this hospitalization  4. Discussed with his primary hematologist oncologist.

## 2020-12-17 NOTE — PROGRESS NOTES
Jazzmine Heredia RN Code, James N. II, MD   Caller: Unspecified (Today, 10:38 AM)             Just an FYI.   Thanks!     Jose called to let Dr. Phelan know that he's currently in the hospital with low hemoglobin.

## 2020-12-17 NOTE — PROGRESS NOTES
Discharge Planning Assessment  [facility]     Patient Name: Jose Hurtado  MRN: [id number]  Today's Date: 12/17/2020    Admit Date: 12/15/2020    Discharge Needs Assessment     Row Name 12/17/20 1436       Living Environment    Lives With  spouse    Name(s) of Who Lives With Patient  Lena Alvarado (spouse) [phone]    Current Living Arrangements  home/apartment/condo    Primary Care Provided by  self;spouse/significant other    Provides Primary Care For  no one, unable/limited ability to care for self    Family Caregiver if Needed  spouse    Family Caregiver Names  Lena Alvarado (spouse) [phone]    Quality of Family Relationships  unable to assess    Able to Return to Prior Arrangements  yes       Resource/Environmental Concerns    Resource/Environmental Concerns  none       Transition Planning    Patient/Family Anticipates Transition to  home with family    Patient/Family Anticipated Services at Transition  none    Transportation Anticipated  family or friend will provide       Discharge Needs Assessment    Readmission Within the Last 30 Days  no previous admission in last 30 days    Equipment Currently Used at Home  cane, straight;cpap;grab bar;shower chair    Concerns to be Addressed  no discharge needs identified;denies needs/concerns at this time    Anticipated Changes Related to Illness  none    Equipment Needed After Discharge  none    Provided Post Acute Provider List?  N/A    N/A Provider List Comment  denies any needs    Current Discharge Risk  chronically ill        Discharge Plan     Row Name 12/17/20 2858       Plan    Plan  Home with spouse. No needs. Family to transport at D/C    Patient/Family in Agreement with Plan  yes    Plan Comments  Pt lives with Lena Alvarado (spouse) [phone]. There are 2 steps to enter home.  DME equipment includes a cane, grab bars, CPAP, and shower chair.  Pt is independent with ADLs. Pt has been to [facility] for Rehab. Pt’s PCP is  Dr. KAILA Bolton. Uses [facility] on [address].  At discharge, family will transport. Explained that CCP would follow to assess for discharge needs.  Haja Post RN-BC        Continued Care and Services - Admitted Since 12/15/2020    Coordination has not been started for this encounter.       Expected Discharge Date and Time     Expected Discharge Date Expected Discharge Time    Dec 18, 2020         Demographic Summary     Row Name 12/17/20 1435       General Information    Admission Type  inpatient    Arrived From  emergency department    Required Notices Provided  Important Message from Medicare    Reason for Consult  discharge planning;care coordination/care conference    Preferred Language  English     Used During This Interaction  no        Functional Status     Row Name 12/17/20 1436       Functional Status    Usual Activity Tolerance  moderate    Current Activity Tolerance  moderate       Functional Status, IADL    Medications  assistive equipment and person    Meal Preparation  assistive equipment and person    Housekeeping  assistive equipment and person    Laundry  assistive equipment and person    Shopping  assistive equipment and person       Mental Status    General Appearance WDL  WDL       Mental Status Summary    Recent Changes in Mental Status/Cognitive Functioning  no changes                Haja Post RN

## 2020-12-17 NOTE — PROGRESS NOTES
Name: Jose Hurtado ADMIT: 12/15/2020   : 1950  PCP: Brandin Bolton MD    MRN: [id number] LOS: 2 days   AGE/SEX: 70 y.o. male  ROOM: [facility]     Subjective   Subjective   No further bowel movements or any bleeding.  Eliquis is on hold.  Patient was previously on this for recurrent DVTs.  He requests consultation with Dr. Phelan    Review of Systems     Objective   Objective   Vital Signs  Temp:  [97.7 °F (36.5 °C)-98.5 °F (36.9 °C)] 97.8 °F (36.6 °C)  Heart Rate:  [62-73] 64  Resp:  [18] 18  BP: (103-141)/(56-82) 111/56  SpO2:  [96 %-99 %] 97 %  on   ;   Device (Oxygen Therapy): room air  Body mass index is 24.9 kg/m².  Physical Exam  Vitals signs reviewed.   Constitutional:       General: He is not in acute distress.     Appearance: He is well-developed.   HENT:      Head: Normocephalic and atraumatic.   Eyes:      General: No scleral icterus.  Neck:      Musculoskeletal: Neck supple.      Vascular: No JVD.   Cardiovascular:      Rate and Rhythm: Normal rate and regular rhythm.      Heart sounds: No murmur.   Pulmonary:      Effort: Pulmonary effort is normal. No respiratory distress.      Breath sounds: Normal breath sounds. No wheezing.   Abdominal:      General: Bowel sounds are normal. There is no distension.      Palpations: Abdomen is soft.      Tenderness: There is no abdominal tenderness.   Musculoskeletal:      Right lower leg: Edema present.      Left lower leg: Edema present.      Comments: 2+   Skin:     General: Skin is warm and dry.      Findings: No rash.   Neurological:      Mental Status: He is alert and oriented to person, place, and time.   Psychiatric:         Mood and Affect: Mood normal.         Thought Content: Thought content normal.         Results Review     I reviewed the patient's new clinical results.  Results from last 7 days   Lab Units 20  0748 20  1951 20  0833 12/15/20  2325  12/15/20  1041   WBC 10*3/mm3 3.19*  --  3.11*  --   --  3.17*   HEMOGLOBIN g/dL  7.1* 7.0* 7.5*  7.5* 7.3*   < > 6.6*   PLATELETS 10*3/mm3 106*  --  102*  --   --  163    < > = values in this interval not displayed.     Results from last 7 days   Lab Units 12/16/20 2142 12/16/20  0833 12/15/20  1041   SODIUM mmol/L  --  139 141   POTASSIUM mmol/L 4.1 4.1 4.1   CHLORIDE mmol/L  --  110* 112*   CO2 mmol/L  --  21.8* 22.0   BUN mg/dL  --  14 19   CREATININE mg/dL  --  1.03 1.19   GLUCOSE mg/dL  --  85 122*   Estimated Creatinine Clearance: 87.4 mL/min (by C-G formula based on SCr of 1.03 mg/dL).  Results from last 7 days   Lab Units 12/15/20  1041   ALBUMIN g/dL 3.50   BILIRUBIN mg/dL 0.2   ALK PHOS U/L 89   AST (SGOT) U/L 22   ALT (SGPT) U/L 12     Results from last 7 days   Lab Units 12/16/20 2142 12/16/20  0833 12/15/20  1041   CALCIUM mg/dL  --  8.1* 8.4*   ALBUMIN g/dL  --   --  3.50   MAGNESIUM mg/dL 2.0  --   --      Results from last 7 days   Lab Units 12/15/20  1105   LACTATE mmol/L 1.0     COVID19   Date Value Ref Range Status   12/15/2020 Not Detected Not Detected - Ref. Range Final     No results found for: HGBA1C, POCGLU    XR Chest 1 View  XR CHEST 1 VW-     Clinical: Shortness of breath     COMPARISON CT chest 05/01/2020 and chest radiograph 03/26/2020     FINDINGS: There is stable prominence of the mediastinum from prior  gastric pull-through. The right-sided pleural effusion has diminished in  size. Right rib deformities again noted. There is cardiac enlargement.  No pulmonary edema identified. No acute consolidation seen. There is  persistent right infrahilar atelectasis again demonstrated.     CONCLUSION: The right-sided pleural effusion has diminished in size,  small amount remains on the current examination. No edema or acute  airspace disease has developed, there is widening of the mediastinum  from previous gastric pull-through procedure. The remainder is  unremarkable.     This report was finalized on 12/15/2020 11:39 AM by Dr. Sarbjit Luis M.D.       Scheduled  Medications  docusate sodium, 100 mg, Oral, BID  ferrous sulfate, 325 mg, Oral, BID With Meals  furosemide, 20 mg, Oral, Daily  hydrocortisone, 25 mg, Rectal, BID  iron sucrose, 300 mg, Intravenous, Q24H  metoprolol tartrate, 25 mg, Oral, BID  pantoprazole, 40 mg, Oral, BID AC  PARoxetine, 40 mg, Oral, QAM  potassium chloride, 10 mEq, Oral, Daily  pramipexole, 1.5 mg, Oral, BID    Infusions   Diet  Diet Regular; Cardiac       Assessment/Plan     Active Hospital Problems    Diagnosis  POA   • Other hemorrhoids [K64.8]  Unknown   • Acute blood loss anemia [D62]  Yes   • Pancytopenia (CMS/HCC) [D61.818]  Yes   • Chronic venous hypertension due to DVT [I87.099]  Yes   • COPD (chronic obstructive pulmonary disease) (CMS/[facility]) [J44.9]  Yes   • Gastrointestinal hemorrhage [K92.2]  Yes   • RLS (restless legs syndrome) [G25.81]  Yes      Resolved Hospital Problems   No resolved problems to display.       70 y.o. male admitted with bright red blood per rectum, likely lower GI bleed secondary to hemorrhoids.    · ABL anemia: Hemoglobin stable/slightly lower.  Additional unit of blood ordered and I agree with this.  Continue to monitor.   · Lower GI bleed: Likely hemorrhoidal.  Surgical recs appreciated.  Holding Eliquis for 1 week per recommendations   · Pancytopenia: White count and platelets also low, not sure of the etiology.    · Discussed with patient.  He would like to talk to hematology about this and I think it is a good idea, especially since we may have to hold the Eliquis and obviously this carry some risk in light of his recurrent DVTs.  · Chronic DVT with peripheral edema which patient says is unchanged.  As above  · Elevated troponin noted-looked back at old labs it looks like he has had this before.  No real change on repeat  · SCDs for DVT prophylaxis.  · Full code.  · Anticipate discharge tomorrow if stable        Albert Pleitez MD  [facility]  12/17/20  15:43 EST    I wore  protective equipment throughout this patient encounter including a face mask, gloves and protective eyewear.  Hand hygiene was performed before donning protective equipment and after removal when leaving the room.

## 2020-12-17 NOTE — PLAN OF CARE
Problem: Adult Inpatient Plan of Care  Goal: Plan of Care Review  Outcome: Ongoing, Progressing   Goal Outcome Evaluation:  Plan of Care Reviewed With: patient  Progress: no change   Vss.hgb 7.1, rec'ving 1 unit prbc currently. To rec'v iron infusion. Ambulated in alvarez with walker and assist x's 1. No c/o pain or n/v.

## 2020-12-17 NOTE — CONSULTS
Inpatient General Surgery Consult  Consult performed by: Billy Julio Jr., MD  Consult ordered by: Albert Pleitez MD          Patient Care Team:  Brandin Bolton MD as PCP - General (Internal Medicine)  Tapan, George THORPE II, MD as Consulting Physician (Hematology and Oncology)  Jose Inman MD as Consulting Physician (Urology)  Mulugeta Millan MD as Consulting Physician (Gastroenterology)  Seth Randhawa MD as Consulting Physician (Ophthalmology)    Chief complaint: Lower GI bleed    Subjective     History of Present Illness     The patient is a pleasant 70-year-old male with a history of DVT for which he takes Eliquis who presented to the emergency room on 12/15/2020 with a lower GI bleed.  He has a known history of internal hemorrhoids and began having bright red blood per rectum.  He has had previous rectal bleeding but this volume was much greater.  He underwent a colonoscopy on 12/16/2020 with no significant abnormalities except for hemorrhoids which were thought to be the source of his bleeding.  He stopped his Eliquis at the time of admission and states the bleeding has now significantly improved.  He does not have any pain related to his hemorrhoids.    Review of Systems   Constitutional: Negative for fatigue and fever.   Respiratory: Negative for chest tightness and shortness of breath.    Cardiovascular: Negative for chest pain and palpitations.   Gastrointestinal: Positive for blood in stool. Negative for abdominal pain, constipation, diarrhea, nausea and vomiting.        Past Medical History:   Diagnosis Date   • Acute deep vein thrombosis (DVT) of popliteal vein of left lower extremity (CMS/HCC) 03/24/2020   • Anemia    • Anti-phospholipid syndrome (CMS/[facility])     On lifelong anticoagulation therapy   • Bladder disorder     LEAKAGE   ON MED  wers pads   • Community acquired pneumonia     HISTORY OF IN 2014   • Deep venous thrombosis (CMS/HCC) 2006, 2008    Left lower extremity  multiple   • Depression    • Esophageal carcinoma (CMS/HCC) 12/31/2014    had chemo and radiation prior surgery   • Hemorrhoids    • HH (hiatus hernia)    • History of atrial fibrillation 2015    ONE EPISODE WHILE HOSPITALIZED   • History of kidney stones    • History of nephrolithiasis    • History of pancreatitis     PT STATES MANY YEARS AGO   • History of radiation therapy    • Hypertension    • Long-term (current) use of anticoagulants, INR goal 2.0-3.0    • Lymphedema    • Malignant neoplasm of prostate (CMS/HCC)    • Other hyperlipidemia 1/30/2018 January 30, 2018 lipid panel risk 12.8%   • Restless legs syndrome    • Shortness of breath    • Squamous carcinoma     on the head   ,   Past Surgical History:   Procedure Laterality Date   • APPENDECTOMY  1950   • BRONCHOSCOPY      (Diagnostic)   • CATARACT EXTRACTION Bilateral 2014   • COLONOSCOPY  12/15/2014    Complete / Description: EH, IH, torts, stool, follow-up colonoscopy due in 5 years.   • COLONOSCOPY N/A 6/13/2017    non-thrombosed external hemorrhoids, normal examined ileum, IH   • COLONOSCOPY N/A 2/26/2019    Procedure: COLONOSCOPY with Cold Polypectomy;  Surgeon: Mulugeta Millan MD;  Location: [facility] ENDOSCOPY;  Service: Gastroenterology   • COLONOSCOPY N/A 12/16/2020    Procedure: COLONOSCOPY to cecum into TI;  Surgeon: Mesha Sanchez MD;  Location: [facility] ENDOSCOPY;  Service: Gastroenterology;  Laterality: N/A;  pre: lower GI bleed  post: hemorrhoids    • CYSTOSCOPY W/ LASER LITHOTRIPSY     • ENDOSCOPY N/A 6/13/2017    Procedure: ESOPHAGOGASTRODUODENOSCOPY WITH COLD BIOPSY;  Surgeon: Lake Gonzalez MD;  Location: [facility] ENDOSCOPY;  Service:    • ESOPHAGECTOMY      April 2015, stage IIB esophageal carcinoma, sub-total resection.   • ESOPHAGECTOMY      Esophagectomy Subtotal [place] Joe Procedure   • EXCISION LESION  08/2012    Removal of Squamous Cell CA on Head   • HAMMER TOE REPAIR  09/2014    Hammertoe Operation (Each Toe), 10/2014    • HAMMER TOE REPAIR Left 10/3/2017    Procedure: Left second third and fourth distal interphalangeal joint resection with flexor tenotomy;  Surgeon: Mulugeta Lira MD;  Location: [facility];  Service:    • HERNIA REPAIR      incisional   • JEJUNOSTOMY      Laparoscopic   • JEJUNOSTOMY      tube removal    • KNEE SURGERY Bilateral , ,    • PATELLA SURGERY Left     removed   • PILONIDAL CYST / SINUS EXCISION     • GA TOTAL KNEE ARTHROPLASTY Right 3/26/2018    Procedure: TOTAL KNEE ARTHROPLASTY;  Surgeon: Renny Solis MD;  Location: [facility] OR;  Service: Orthopedics   • PROSTATECTOMY     • PYLOROPLASTY     • SPINAL FUSION  1998    C 5,6   • TONSILLECTOMY     • TONSILLECTOMY     • UPPER GASTROINTESTINAL ENDOSCOPY  12/15/2014    LA Grade D esophagitis, Pardo's, HH, multiple duodenal ulcers   • VENTRAL/INCISIONAL HERNIA REPAIR N/A 2016    Procedure: VENTRAL/INCISIONAL HERNIA REPAIR, open, with mesh, and component separation;  Surgeon: Darren Rivas MD;  Location: [facility];  Service:    ,   Family History   Problem Relation Age of Onset   • Cerebral aneurysm Mother         cerebral artery aneurysm ( age 56)   • Prostate cancer Brother 68   • Anxiety disorder Father    • Suicide Attempts Father          of suicide   • Cancer Father         bladder   • No Known Problems Brother    • Pancreatic cancer Nephew    • Colon cancer Neg Hx    • Esophageal cancer Neg Hx    • Dementia Neg Hx    ,   Social History     Tobacco Use   • Smoking status: Former Smoker     Packs/day: 2.00     Years: 3.00     Pack years: 6.00     Types: Cigarettes     Quit date:      Years since quittin.9   • Smokeless tobacco: Never Used   • Tobacco comment: no caffeine    Substance Use Topics   • Alcohol use: Yes     Frequency: 2-3 times a week     Comment: 2-3 x per week   • Drug use: No     E-cigarette/Vaping     E-cigarette/Vaping Substances     E-cigarette/Vaping Devices         and Allergies:  Patient has no known allergies.    Objective      Vital Signs  Temp:  [97.7 °F (36.5 °C)-98.5 °F (36.9 °C)] 97.7 °F (36.5 °C)  Heart Rate:  [63-73] 67  Resp:  [14-18] 18  BP: ()/(47-72) 137/70    Physical Exam  Constitutional:       Appearance: Normal appearance. He is well-developed. He is not toxic-appearing.   Eyes:      General: No scleral icterus.  Pulmonary:      Effort: Pulmonary effort is normal. No respiratory distress.   Abdominal:      Palpations: Abdomen is soft.      Tenderness: There is no abdominal tenderness.   Genitourinary:     Comments: Rectal: Normal sphincter tone, no significant external hemorrhoid disease.  Skin:     General: Skin is warm and dry.   Neurological:      Mental Status: He is alert and oriented to person, place, and time.   Psychiatric:         Behavior: Behavior normal.         Thought Content: Thought content normal.         Judgment: Judgment normal.         Results Review:    I reviewed the patient's new clinical results.        Assessment/Plan       RLS (restless legs syndrome)    Gastrointestinal hemorrhage    Acute blood loss anemia    Pancytopenia (CMS/HCC)    Chronic venous hypertension due to DVT    COPD (chronic obstructive pulmonary disease) (CMS/HCC)    Other hemorrhoids      Assessment & Plan     1.  Bleeding hemorrhoids: The patient has internal hemorrhoids that seem to be the source of lower GI bleeding.  This is caused by Eliquis.  I agree with holding Eliquis at the present time.  We will transfuse 1 unit of packed red blood cells for hemoglobin of 7.1 and hematocrit of 21.3.  He will be ready for discharge to home off Eliquis with Anusol suppositories twice a day.  We will see him as an outpatient and determine if a hemorrhoidectomy is necessary.  If a hemorrhoidectomy is performed, there will be raw edges that may be prone to bleed for several weeks as healing proceeds.    I discussed the patients findings and my recommendations  with patient    Billy Julio Jr., MD  12/17/20  09:05 EST

## 2020-12-17 NOTE — PROGRESS NOTES
[facility]  Inpatient Progress Note    Reason for Follow Up:  Rectal bleeding (felt to be from hemorrhoids)    Subjective     Interval History:   Tolerated colonoscopy 12/16 when bleeding internal hemorrhroids were found.     Current Facility-Administered Medications:   •  albuterol (PROVENTIL) nebulizer solution 0.083% 2.5 mg/3mL, 2.5 mg, Nebulization, Q6H PRN, Albert Pleitez MD  •  docusate sodium (COLACE) capsule 100 mg, 100 mg, Oral, BID, Mesha Sanchez MD  •  furosemide (LASIX) tablet 20 mg, 20 mg, Oral, Daily, Albert Pleitez MD  •  hydrocortisone (ANUSOL-HC) suppository 25 mg, 25 mg, Rectal, BID, Mesha Sanchez MD, 25 mg at 12/16/20 2101  •  metoprolol tartrate (LOPRESSOR) tablet 25 mg, 25 mg, Oral, BID, Albert Pleitez MD, 25 mg at 12/16/20 2100  •  pantoprazole (PROTONIX) EC tablet 40 mg, 40 mg, Oral, BID AC, Albert Pleitez MD, 40 mg at 12/17/20 0637  •  PARoxetine (PAXIL) tablet 40 mg, 40 mg, Oral, QAM, Albert Pleitez MD, 40 mg at 12/17/20 0637  •  potassium chloride (K-DUR,KLOR-CON) CR tablet 10 mEq, 10 mEq, Oral, Daily, Albert Pleitez MD  •  pramipexole (MIRAPEX) tablet 1.5 mg, 1.5 mg, Oral, BID, Albert Pleitez MD, 1.5 mg at 12/16/20 2100  Review of Systems:    The following systems were reviewed and negative;  constitution, ENT, respiratory, cardiovascular, musculoskeletal and neurological    Objective     Vital Signs  Temp:  [97.7 °F (36.5 °C)-98.5 °F (36.9 °C)] 97.7 °F (36.5 °C)  Heart Rate:  [63-73] 67  Resp:  [14-18] 18  BP: ()/(47-72) 137/70  Body mass index is 24.9 kg/m².    Intake/Output Summary (Last 24 hours) at 12/17/2020 0805  Last data filed at 12/17/2020 0300  Gross per 24 hour   Intake 220 ml   Output --   Net 220 ml     No intake/output data recorded.     Physical Exam:   General: patient awake, alert and cooperative   Eyes: Normal lids and lashes, no scleral  icterus   Neck: supple, normal ROM   Skin: warm and dry, not jaundiced   Cardiovascular: regular rhythm and rate, no murmurs auscultated   Pulm: clear to auscultation bilaterally, regular and unlabored   Abdomen: soft, nontender, nondistended; normal bowel sounds   Extremities: no rash or edema   Psychiatric: Normal mood and behavior; memory intact     Results Review:     I reviewed the patient's new clinical results.    Results from last 7 days   Lab Units 12/16/20  1951 12/16/20  0833 12/15/20  2325  12/15/20  1041   WBC 10*3/mm3  --  3.11*  --   --  3.17*   HEMOGLOBIN g/dL 7.0* 7.5*  7.5* 7.3*   < > 6.6*   HEMATOCRIT % 21.2* 22.7*  22.7* 22.9*   < > 20.5*   PLATELETS 10*3/mm3  --  102*  --   --  163    < > = values in this interval not displayed.     Results from last 7 days   Lab Units 12/16/20  2142 12/16/20  0833 12/15/20  1041   SODIUM mmol/L  --  139 141   POTASSIUM mmol/L 4.1 4.1 4.1   CHLORIDE mmol/L  --  110* 112*   CO2 mmol/L  --  21.8* 22.0   BUN mg/dL  --  14 19   CREATININE mg/dL  --  1.03 1.19   CALCIUM mg/dL  --  8.1* 8.4*   BILIRUBIN mg/dL  --   --  0.2   ALK PHOS U/L  --   --  89   ALT (SGPT) U/L  --   --  12   AST (SGOT) U/L  --   --  22   GLUCOSE mg/dL  --  85 122*     Results from last 7 days   Lab Units 12/16/20  0833   INR  1.27*     Lab Results   Lab Value Date/Time    LIPASE 18 08/30/2016 2057       Radiology:  XR Chest 1 View   Final Result          Assessment/Plan     Patient Active Problem List   Diagnosis   • Adenocarcinoma of esophagus (CMS/HCC)   • Adenomatous polyp of colon   • Malignant neoplasm of prostate (CMS/HCC)   • RLS (restless legs syndrome)   • Recurrent major depressive disorder, in partial remission (CMS/HCC)   • Hypertension   • Anemia   • Insomnia due to other mental disorder   • Peripheral polyneuropathy   • B12 deficiency   • Postsurgical malabsorption   • Lymphedema   • Iron deficiency   • Gastroparesis   • Acute deep vein thrombosis (DVT) of popliteal vein of left  lower extremity (CMS/HCC)   • Tremor of both hands   • Gastrointestinal hemorrhage   • Acute blood loss anemia   • Pancytopenia (CMS/HCC)   • Chronic venous hypertension due to DVT   • COPD (chronic obstructive pulmonary disease) (CMS/HCC)       Assessment/Recommendations:    1. Lower GI bleeding while on Eliquis. Colonoscopy 12/16 showed bleeding internal hemorrhoids. On Anusol HC suppostories. Hold Eliquis for one week if possible. If rebleeding starts consider asking for a Colon-Rectal Surgery opinion.  2. Iron deficiency anemia - Continue serial H/H and transfuse as needed. I will start po iron. Consider IV iron.     I discussed the patients findings and my recommendations with patient.    Robert Mota MD

## 2020-12-18 ENCOUNTER — READMISSION MANAGEMENT (OUTPATIENT)
Dept: CALL CENTER | Facility: HOSPITAL | Age: 70
End: 2020-12-18

## 2020-12-18 ENCOUNTER — TELEPHONE (OUTPATIENT)
Dept: SURGERY | Facility: CLINIC | Age: 70
End: 2020-12-18

## 2020-12-18 VITALS
OXYGEN SATURATION: 96 % | RESPIRATION RATE: 18 BRPM | HEART RATE: 70 BPM | DIASTOLIC BLOOD PRESSURE: 52 MMHG | WEIGHT: 210 LBS | HEIGHT: 77 IN | TEMPERATURE: 99.5 F | SYSTOLIC BLOOD PRESSURE: 110 MMHG | BODY MASS INDEX: 24.79 KG/M2

## 2020-12-18 DIAGNOSIS — K64.9 BLEEDING HEMORRHOID: Primary | ICD-10-CM

## 2020-12-18 LAB
BASOPHILS # BLD AUTO: 0.02 10*3/MM3 (ref 0–0.2)
BASOPHILS NFR BLD AUTO: 0.4 % (ref 0–1.5)
BH BB BLOOD EXPIRATION DATE: NORMAL
BH BB BLOOD TYPE BARCODE: 5100
BH BB DISPENSE STATUS: NORMAL
BH BB PRODUCT CODE: NORMAL
BH BB UNIT NUMBER: NORMAL
CROSSMATCH INTERPRETATION: NORMAL
DEPRECATED RDW RBC AUTO: 50.1 FL (ref 37–54)
EOSINOPHIL # BLD AUTO: 0.12 10*3/MM3 (ref 0–0.4)
EOSINOPHIL NFR BLD AUTO: 2.5 % (ref 0.3–6.2)
ERYTHROCYTE [DISTWIDTH] IN BLOOD BY AUTOMATED COUNT: 14.5 % (ref 12.3–15.4)
HCT VFR BLD AUTO: 23.1 % (ref 37.5–51)
HCT VFR BLD AUTO: 24.2 % (ref 37.5–51)
HCT VFR BLD AUTO: 27.7 % (ref 37.5–51)
HGB BLD-MCNC: 7.7 G/DL (ref 13–17.7)
HGB BLD-MCNC: 7.9 G/DL (ref 13–17.7)
HGB BLD-MCNC: 8.9 G/DL (ref 13–17.7)
IMM GRANULOCYTES # BLD AUTO: 0.02 10*3/MM3 (ref 0–0.05)
IMM GRANULOCYTES NFR BLD AUTO: 0.4 % (ref 0–0.5)
LYMPHOCYTES # BLD AUTO: 0.81 10*3/MM3 (ref 0.7–3.1)
LYMPHOCYTES NFR BLD AUTO: 16.6 % (ref 19.6–45.3)
MCH RBC QN AUTO: 30.3 PG (ref 26.6–33)
MCHC RBC AUTO-ENTMCNC: 32.1 G/DL (ref 31.5–35.7)
MCV RBC AUTO: 94.2 FL (ref 79–97)
MONOCYTES # BLD AUTO: 0.43 10*3/MM3 (ref 0.1–0.9)
MONOCYTES NFR BLD AUTO: 8.8 % (ref 5–12)
NEUTROPHILS NFR BLD AUTO: 3.48 10*3/MM3 (ref 1.7–7)
NEUTROPHILS NFR BLD AUTO: 71.3 % (ref 42.7–76)
NRBC BLD AUTO-RTO: 0.2 /100 WBC (ref 0–0.2)
PLATELET # BLD AUTO: 130 10*3/MM3 (ref 140–450)
PMV BLD AUTO: 9.8 FL (ref 6–12)
RBC # BLD AUTO: 2.94 10*6/MM3 (ref 4.14–5.8)
UNIT  ABO: NORMAL
UNIT  RH: NORMAL
WBC # BLD AUTO: 4.88 10*3/MM3 (ref 3.4–10.8)

## 2020-12-18 PROCEDURE — 99231 SBSQ HOSP IP/OBS SF/LOW 25: CPT | Performed by: INTERNAL MEDICINE

## 2020-12-18 PROCEDURE — 85014 HEMATOCRIT: CPT | Performed by: INTERNAL MEDICINE

## 2020-12-18 PROCEDURE — 85025 COMPLETE CBC W/AUTO DIFF WBC: CPT | Performed by: HOSPITALIST

## 2020-12-18 PROCEDURE — 85018 HEMOGLOBIN: CPT | Performed by: INTERNAL MEDICINE

## 2020-12-18 PROCEDURE — 85018 HEMOGLOBIN: CPT | Performed by: HOSPITALIST

## 2020-12-18 PROCEDURE — 99232 SBSQ HOSP IP/OBS MODERATE 35: CPT | Performed by: INTERNAL MEDICINE

## 2020-12-18 PROCEDURE — 85014 HEMATOCRIT: CPT | Performed by: HOSPITALIST

## 2020-12-18 PROCEDURE — 99231 SBSQ HOSP IP/OBS SF/LOW 25: CPT | Performed by: SURGERY

## 2020-12-18 RX ORDER — HYDROCORTISONE ACETATE 25 MG/1
25 SUPPOSITORY RECTAL 2 TIMES DAILY
Qty: 60 SUPPOSITORY | Refills: 0 | Status: ON HOLD | OUTPATIENT
Start: 2020-12-18 | End: 2021-05-11

## 2020-12-18 RX ORDER — PSEUDOEPHEDRINE HCL 30 MG
100 TABLET ORAL 2 TIMES DAILY
Qty: 60 CAPSULE | Refills: 1 | Status: ON HOLD | OUTPATIENT
Start: 2020-12-18 | End: 2021-05-11

## 2020-12-18 RX ORDER — CEFAZOLIN SODIUM 2 G/100ML
2 INJECTION, SOLUTION INTRAVENOUS ONCE
Status: CANCELLED | OUTPATIENT
Start: 2020-12-23 | End: 2020-12-18

## 2020-12-18 RX ORDER — FERROUS SULFATE 325(65) MG
325 TABLET ORAL 2 TIMES DAILY WITH MEALS
Qty: 30 TABLET | Refills: 1 | Status: SHIPPED | OUTPATIENT
Start: 2020-12-18 | End: 2021-09-14 | Stop reason: ALTCHOICE

## 2020-12-18 RX ADMIN — METOPROLOL TARTRATE 25 MG: 25 TABLET, FILM COATED ORAL at 09:43

## 2020-12-18 RX ADMIN — PANTOPRAZOLE SODIUM 40 MG: 40 TABLET, DELAYED RELEASE ORAL at 06:30

## 2020-12-18 RX ADMIN — POTASSIUM CHLORIDE 10 MEQ: 750 TABLET, EXTENDED RELEASE ORAL at 09:43

## 2020-12-18 RX ADMIN — FERROUS SULFATE TAB 325 MG (65 MG ELEMENTAL FE) 325 MG: 325 (65 FE) TAB at 09:42

## 2020-12-18 RX ADMIN — DOCUSATE SODIUM 100 MG: 100 CAPSULE ORAL at 09:43

## 2020-12-18 RX ADMIN — HYDROCORTISONE ACETATE 25 MG: 25 SUPPOSITORY RECTAL at 09:43

## 2020-12-18 RX ADMIN — PAROXETINE HYDROCHLORIDE HEMIHYDRATE 40 MG: 20 TABLET, FILM COATED ORAL at 06:29

## 2020-12-18 RX ADMIN — FUROSEMIDE 20 MG: 20 TABLET ORAL at 09:43

## 2020-12-18 RX ADMIN — PRAMIPEXOLE DIHYDROCHLORIDE 1.5 MG: 1.5 TABLET ORAL at 09:43

## 2020-12-18 NOTE — PROGRESS NOTES
Case Management Discharge Note      Final Note: Home with spouse. No needs. Family to transport at D/C    Provided Post Acute Provider List?: N/A  N/A Provider List Comment: denies any needs    Selected Continued Care - Admitted Since 12/15/2020     Destination    No services have been selected for the patient.              Durable Medical Equipment    No services have been selected for the patient.              Dialysis/Infusion    No services have been selected for the patient.              Home Medical Care    No services have been selected for the patient.              Therapy    No services have been selected for the patient.              Community Resources    No services have been selected for the patient.                  Transportation Services  Private: Car    Final Discharge Disposition Code: 01 - home or self-care

## 2020-12-18 NOTE — DISCHARGE SUMMARY
Patient Name: Jose Hurtado  : 1950  MRN: [id number]    Date of Admission: 12/15/2020  Date of Discharge:  2020  Primary Care Physician: Brandin Bolton MD      Chief Complaint:   Black or Bloody Stool and Shortness of Breath      Discharge Diagnoses     Active Hospital Problems    Diagnosis  POA   • **Acute blood loss anemia [D62]  Yes   • Bleeding hemorrhoid [K64.9]  Yes   • Pancytopenia (CMS/HCC) [D61.818]  Yes   • Chronic venous hypertension due to DVT [I87.099]  Yes   • COPD (chronic obstructive pulmonary disease) (CMS/HCC) [J44.9]  Yes   • Gastrointestinal hemorrhage [K92.2]  Yes   • RLS (restless legs syndrome) [G25.81]  Yes      Resolved Hospital Problems   No resolved problems to display.        Hospital Course     Mr. Hurtado is a 70 y.o. male with a history of esophageal cancer, COPD, RLS, recurrent DVT and ?APL syndrome who presented to [facility] initially complaining of rectal bleeding.  Please see the admitting H&P for further details.  He was found to have symptomatic anemia with hgb of 6.6. He was transfused 2 unit of PRBC initially and later given an additional unit. He was seen by GI who did colonoscopy showing internal hemorrhoids which were thought to be the source of his bleeding. His Eliquis was held which obviously is concerning given his propensity to clot in the past. His hematologist saw him here and gave him some IV iron as well. Surgery saw him about the hemorrhoids and recommends repair, though patient would prefer this be done after the holiday. He will folow up with Dr Julio for this and will resume eliquis per his instructions. Hgb was down slightly this AM but on recheck was stable. I feel the values obtained last night immediatey after transfusion were probably falsely elevated.           Day of Discharge     Subjective:  No new complaints    Review of Systems    Physical Exam:  Temp:  [97.7 °F (36.5 °C)-99.5 °F (37.5 °C)] 99.5 °F (37.5 °C)  Heart Rate:   [66-73] 70  Resp:  [16-18] 18  BP: (110-133)/(52-66) 110/52  Body mass index is 24.9 kg/m².  Physical Exam   See prog note for today    Consultants     Consult Orders (all) (From admission, onward)     Start     Ordered    12/17/20 1129  Hematology & Oncology Inpatient Consult  Once     Specialty:  Hematology and Oncology  Provider:  Mulugeta Ross MD    12/17/20 1130    12/16/20 1600  Inpatient General Surgery Consult  Once     Specialty:  General Surgery  Provider:  Darren Rivas MD    12/16/20 1600    12/16/20 1427  Inpatient Colorectal Surgery Consult  Once,   Status:  Canceled     Specialty:  Colon and Rectal Surgery  Provider:  Richardson Garcia MD    12/16/20 1427    12/15/20 2104  Inpatient Gastroenterology Consult  Once,   Status:  Canceled     Specialty:  Gastroenterology  Provider:  Mesha Sanchez MD    12/15/20 2104    12/15/20 1206  Gastroenterology (on-call MD unless specified)  Once     Specialty:  Gastroenterology  Provider:  (Not yet assigned)    12/15/20 1205    12/15/20 1205  [facility] (on-call MD unless specified) Details  Once     Specialty:  Hospitalist  Provider:  (Not yet assigned)    12/15/20 1205              Procedures     COLONOSCOPY to cecum into TI      Imaging Results (All)     Procedure Component Value Units Date/Time    XR Chest 1 View [[id number]] Collected: 12/15/20 1137     Updated: 12/15/20 1142    Narrative:      XR CHEST 1 VW-     Clinical: Shortness of breath     COMPARISON CT chest 05/01/2020 and chest radiograph 03/26/2020     FINDINGS: There is stable prominence of the mediastinum from prior  gastric pull-through. The right-sided pleural effusion has diminished in  size. Right rib deformities again noted. There is cardiac enlargement.  No pulmonary edema identified. No acute consolidation seen. There is  persistent right infrahilar atelectasis again demonstrated.     CONCLUSION: The right-sided pleural effusion has diminished in size,  small amount remains on  the current examination. No edema or acute  airspace disease has developed, there is widening of the mediastinum  from previous gastric pull-through procedure. The remainder is  unremarkable.     This report was finalized on 12/15/2020 11:39 AM by Dr. Sarbjit Luis M.D.               Pertinent Labs     Results from last 7 days   Lab Units 12/18/20  1004 12/18/20  0737 12/18/20  0348 12/17/20  1951 12/17/20  0748  12/16/20  0833  12/15/20  1041   WBC 10*3/mm3  --   --  4.88  --  3.19*  --  3.11*  --  3.17*   HEMOGLOBIN g/dL 7.9* 7.7* 8.9* 8.8* 7.1*   < > 7.5*  7.5*   < > 6.6*   PLATELETS 10*3/mm3  --   --  130*  --  106*  --  102*  --  163    < > = values in this interval not displayed.     Results from last 7 days   Lab Units 12/16/20 2142 12/16/20  0833 12/15/20  1041   SODIUM mmol/L  --  139 141   POTASSIUM mmol/L 4.1 4.1 4.1   CHLORIDE mmol/L  --  110* 112*   CO2 mmol/L  --  21.8* 22.0   BUN mg/dL  --  14 19   CREATININE mg/dL  --  1.03 1.19   GLUCOSE mg/dL  --  85 122*   Estimated Creatinine Clearance: 87.4 mL/min (by C-G formula based on SCr of 1.03 mg/dL).  Results from last 7 days   Lab Units 12/15/20  1041   ALBUMIN g/dL 3.50   BILIRUBIN mg/dL 0.2   ALK PHOS U/L 89   AST (SGOT) U/L 22   ALT (SGPT) U/L 12     Results from last 7 days   Lab Units 12/16/20  2142 12/16/20  0833 12/15/20  1041   CALCIUM mg/dL  --  8.1* 8.4*   ALBUMIN g/dL  --   --  3.50   MAGNESIUM mg/dL 2.0  --   --        Results from last 7 days   Lab Units 12/16/20  0833 12/15/20  1041   TROPONIN T ng/mL 0.067* 0.053*           Invalid input(s): LDLCALC      Results from last 7 days   Lab Units 12/15/20  1208   COVID19  Not Detected       Test Results Pending at Discharge       Discharge Details        Discharge Medications      New Medications      Instructions Start Date   docusate sodium 100 MG capsule   100 mg, Oral, 2 Times Daily      ferrous sulfate 325 (65 FE) MG tablet   325 mg, Oral, 2 Times Daily With Meals      hydrocortisone  25 MG suppository  Commonly known as: ANUSOL-HC   25 mg, Rectal, 2 Times Daily         Changes to Medications      Instructions Start Date   Stiolto Respimat 2.5-2.5 MCG/ACT aerosol solution inhaler  Generic drug: tiotropium bromide-olodaterol  What changed: additional instructions   2 puffs, Inhalation, Daily         Continue These Medications      Instructions Start Date   albuterol sulfate  (90 Base) MCG/ACT inhaler  Commonly known as: PROVENTIL HFA;VENTOLIN HFA;PROAIR HFA   1 puff, Inhalation, Every 4 Hours PRN      furosemide 20 MG tablet  Commonly known as: LASIX   TAKE ONE TABLET BY MOUTH DAILY      metoprolol tartrate 25 MG tablet  Commonly known as: LOPRESSOR   25 mg, Oral, 2 Times Daily      pantoprazole 40 MG EC tablet  Commonly known as: PROTONIX   40 mg, Oral, 2 Times Daily Before Meals      PARoxetine 40 MG tablet  Commonly known as: PAXIL   TAKE ONE TABLET BY MOUTH EVERY MORNING      potassium chloride 10 MEQ CR capsule  Commonly known as: MICRO-K   10 mEq, Oral, Daily      pramipexole 1.5 MG tablet  Commonly known as: MIRAPEX   TAKE ONE TABLET BY MOUTH TWICE A DAY         Stop These Medications    Eliquis 5 MG tablet tablet  Generic drug: apixaban            No Known Allergies      Discharge Disposition:  Home or Self Care    Discharge Diet:  No active diet order      Discharge Activity:       CODE STATUS:    Code Status and Medical Interventions:   Ordered at: 12/15/20 2104     Code Status:    CPR     Medical Interventions (Level of Support Prior to Arrest):    Full       Future Appointments   Date Time Provider Department Center   12/23/2020 10:30 AM MA PC [facility]002 [facility] PC [facility]   12/29/2020  9:10 AM LAB CHAIR [facility] [place]   1/5/2021  1:00 PM VITALS ONLY [facility] LAB [facility] [place]   1/5/2021  1:20 PM Code, George THORPE II, MD [facility]   3/17/2021 10:30 AM TONIE CT 3  TONIE CT [facility]   3/23/2021  9:30 AM Jose Christine III, MD [facility] TS [facility] None      5/19/2021  9:45 AM Brandin Bolton MD [facility] PC [facility]   9/1/2021  9:45 AM James Cyr MD [facility] CD [facility] None     Additional Instructions for the Follow-ups that You Need to Schedule     Discharge Follow-up with PCP   As directed       Currently Documented PCP:    Brandin Bolton MD    PCP Phone Number:    [phone]     Follow Up Details: 1 week           Follow-up Information     Brandin Bolton MD. Go on 12/23/2020.    Specialty: Internal Medicine  Why: Nurse Visit with ROSALIO HERNANDES [id number]   [facility] ([place])    Appointment time is 10:30 AM.    1 week  Contact information:  [address] [place] [address]  [phone]                   Additional Instructions for the Follow-ups that You Need to Schedule     Discharge Follow-up with PCP   As directed       Currently Documented PCP:    Brandin Bolton MD    PCP Phone Number:    [phone]     Follow Up Details: 1 week           Time Spent on Discharge:  Greater than 30 minutes      Albert Pleitez MD  [facility]  12/18/20  18:59 EST

## 2020-12-18 NOTE — PROGRESS NOTES
Name: Jose Hurtado ADMIT: 12/15/2020   : 1950  PCP: Brandin Bolton MD    MRN: [id number] LOS: 3 days   AGE/SEX: 70 y.o. male  ROOM: [facility]     Subjective   Subjective   Patient reports small amount of blood with his stool this morning.  Much less than before but still present.    Review of Systems     Objective   Objective   Vital Signs  Temp:  [97.7 °F (36.5 °C)-99.5 °F (37.5 °C)] 99.5 °F (37.5 °C)  Heart Rate:  [62-73] 70  Resp:  [16-18] 18  BP: (103-141)/(52-82) 110/52  SpO2:  [96 %-97 %] 96 %  on   ;   Device (Oxygen Therapy): room air  Body mass index is 24.9 kg/m².  Physical Exam  Vitals signs reviewed.   Constitutional:       General: He is not in acute distress.     Appearance: He is well-developed.   HENT:      Head: Normocephalic and atraumatic.      Mouth/Throat:      Pharynx: No oropharyngeal exudate.   Eyes:      General: No scleral icterus.  Neck:      Musculoskeletal: Neck supple.      Vascular: No JVD.   Cardiovascular:      Rate and Rhythm: Normal rate and regular rhythm.      Heart sounds: No murmur.   Pulmonary:      Effort: Pulmonary effort is normal. No respiratory distress.      Breath sounds: Normal breath sounds. No wheezing.   Abdominal:      General: Bowel sounds are normal. There is no distension.      Palpations: Abdomen is soft.      Tenderness: There is no abdominal tenderness.   Musculoskeletal:      Right lower leg: Edema present.      Left lower leg: Edema present.      Comments: 2-3+   Skin:     General: Skin is warm and dry.      Findings: No rash.   Neurological:      Mental Status: He is alert and oriented to person, place, and time.   Psychiatric:         Mood and Affect: Mood normal.         Thought Content: Thought content normal.         Results Review     I reviewed the patient's new clinical results.  Results from last 7 days   Lab Units 20  0737 20  0348 20  1951 20  0748  20  0833  12/15/20  1041   WBC 10*3/mm3  --  4.88  --  3.19*   --  3.11*  --  3.17*   HEMOGLOBIN g/dL 7.7* 8.9* 8.8* 7.1*   < > 7.5*  7.5*   < > 6.6*   PLATELETS 10*3/mm3  --  130*  --  106*  --  102*  --  163    < > = values in this interval not displayed.     Results from last 7 days   Lab Units 12/16/20  2142 12/16/20  0833 12/15/20  1041   SODIUM mmol/L  --  139 141   POTASSIUM mmol/L 4.1 4.1 4.1   CHLORIDE mmol/L  --  110* 112*   CO2 mmol/L  --  21.8* 22.0   BUN mg/dL  --  14 19   CREATININE mg/dL  --  1.03 1.19   GLUCOSE mg/dL  --  85 122*   Estimated Creatinine Clearance: 87.4 mL/min (by C-G formula based on SCr of 1.03 mg/dL).  Results from last 7 days   Lab Units 12/15/20  1041   ALBUMIN g/dL 3.50   BILIRUBIN mg/dL 0.2   ALK PHOS U/L 89   AST (SGOT) U/L 22   ALT (SGPT) U/L 12     Results from last 7 days   Lab Units 12/16/20  2142 12/16/20  0833 12/15/20  1041   CALCIUM mg/dL  --  8.1* 8.4*   ALBUMIN g/dL  --   --  3.50   MAGNESIUM mg/dL 2.0  --   --      Results from last 7 days   Lab Units 12/15/20  1105   LACTATE mmol/L 1.0     COVID19   Date Value Ref Range Status   12/15/2020 Not Detected Not Detected - Ref. Range Final     No results found for: HGBA1C, POCGLU    XR Chest 1 View  XR CHEST 1 VW-     Clinical: Shortness of breath     COMPARISON CT chest 05/01/2020 and chest radiograph 03/26/2020     FINDINGS: There is stable prominence of the mediastinum from prior  gastric pull-through. The right-sided pleural effusion has diminished in  size. Right rib deformities again noted. There is cardiac enlargement.  No pulmonary edema identified. No acute consolidation seen. There is  persistent right infrahilar atelectasis again demonstrated.     CONCLUSION: The right-sided pleural effusion has diminished in size,  small amount remains on the current examination. No edema or acute  airspace disease has developed, there is widening of the mediastinum  from previous gastric pull-through procedure. The remainder is  unremarkable.     This report was finalized on 12/15/2020  11:39 AM by Dr. Sarbjit Luis M.D.       Scheduled Medications  docusate sodium, 100 mg, Oral, BID  ferrous sulfate, 325 mg, Oral, BID With Meals  furosemide, 20 mg, Oral, Daily  hydrocortisone, 25 mg, Rectal, BID  iron sucrose, 300 mg, Intravenous, Q24H  metoprolol tartrate, 25 mg, Oral, BID  pantoprazole, 40 mg, Oral, BID AC  PARoxetine, 40 mg, Oral, QAM  potassium chloride, 10 mEq, Oral, Daily  pramipexole, 1.5 mg, Oral, BID    Infusions   Diet  Diet Regular; Cardiac       Assessment/Plan     Active Hospital Problems    Diagnosis  POA   • Other hemorrhoids [K64.8]  Unknown   • Acute blood loss anemia [D62]  Yes   • Pancytopenia (CMS/HCC) [D61.818]  Yes   • Chronic venous hypertension due to DVT [I87.099]  Yes   • COPD (chronic obstructive pulmonary disease) (CMS/HCC) [J44.9]  Yes   • Gastrointestinal hemorrhage [K92.2]  Yes   • RLS (restless legs syndrome) [G25.81]  Yes      Resolved Hospital Problems   No resolved problems to display.       70 y.o. male admitted with bright red blood per rectum, likely lower GI bleed secondary to hemorrhoids.    · ABL anemia: Status post 3 total units.  Hemoglobin down over a gram this morning over a 3+-hour time span but he did not really have any visible bleeding to account for this.  I suspect that the higher reading was incorrect as the only got 1 unit should not have gone all the way up to 8.9.  However I will recheck it to confirm now.  · Lower GI bleed: Likely hemorrhoidal.  Appreciate surgical recommendations.  Not sure if there would be any benefit in going ahead and proceeding with a hemorrhoidectomy while he is off of his Eliquis as planned for the next week.  Obviously he has high risk to have clots while off Eliquis since he has had multiple DVTs in the past.  See discussion below  · Pancytopenia: Slightly better.  Hematology following.  There is notation in the chart of him having antiphospholipid antibody syndrome but the only labs we have to check that earlier  this year showed only a trivial elevation in the anticardiolipin antibodies and negative lupus anticoagulant and beta glycoprotein.  Not sure if this is accurate diagnosis but obviously he has had numerous DVTs in the past so may be a moot point  · Chronic DVT with peripheral edema which patient says is unchanged.  · Elevated troponin -looked back at old labs it looks like he has had this before.  Stable on recheck  · History of esophageal cancer  · SCDs for DVT prophylaxis.  · Full code.  · Discussed with wife by phone  · If no plans for intervention now and hemoglobin stable could go home later today.        Albert Pleitez MD  [facility]  12/18/20  09:27 EST    I wore protective equipment throughout this patient encounter including a face mask, gloves and protective eyewear.  Hand hygiene was performed before donning protective equipment and after removal when leaving the room.

## 2020-12-18 NOTE — OUTREACH NOTE
Prep Survey      Responses   [facility] [place] patient discharged from?  [place]   Is LACE score < 7 ?  No   Eligibility  [facility]   Date of Admission  12/15/20   Date of Discharge  12/18/20   Discharge Disposition  Home or Self Care   Discharge diagnosis  Acute blood loss anemia,  colonoscopy   Does the patient have one of the following disease processes/diagnoses(primary or secondary)?  Other   Does the patient have Home health ordered?  No   Is there a DME ordered?  No   Prep survey completed?  Yes          Rachel De Leon RN

## 2020-12-18 NOTE — PLAN OF CARE
Goal Outcome Evaluation:  Plan of Care Reviewed With: patient   PT due for discharge,Hgb 7.9, VSS ,denies any SOB,Reports scant blood in stool.No major concerns at this time.

## 2020-12-18 NOTE — PROGRESS NOTES
Chief Complaint:    Hemorrhoids with lower GI bleed    Subjective:    The patient is feeling well with no significant bleeding now that he is off Eliquis.    Objective:    Temp:  [97.7 °F (36.5 °C)-99.5 °F (37.5 °C)] 99.5 °F (37.5 °C)  Heart Rate:  [62-73] 70  Resp:  [16-18] 18  BP: (110-141)/(52-82) 110/52    Physical Exam  Constitutional:       Appearance: He is not ill-appearing or toxic-appearing.   Neurological:      Mental Status: He is alert.   Psychiatric:         Behavior: Behavior is cooperative.         Results:    H/H is 7.9/24.2.    Assessment/Plan:    The patient had a lower GI bleed related to hemorrhoids.  There is no significant bleeding now that he is off Eliquis.  He would like to have a hemorrhoidectomy at some point but prefers after Yogesh.  He is ready for discharge to home from a surgical standpoint and we will arrange for hemorrhoidectomy as an outpatient procedure.    Billy Julio Jr., M.D.

## 2020-12-18 NOTE — PLAN OF CARE
Goal Outcome Evaluation:  Plan of Care Reviewed With: patient  Progress: no change  Outcome Summary: vitals stable.  pt denied pain.  meds given per orders.  1 unit of PRBCs given yesterday.   q 12 hour H&H.  will continue to monitor.

## 2020-12-18 NOTE — PROGRESS NOTES
Continued Stay Note  [facility]     Patient Name: Jose Hurtado  MRN: [id number]  Today's Date: 12/18/2020    Admit Date: 12/15/2020    Discharge Plan     Row Name 12/18/20 1129       Plan    Plan  Home with spouse. No needs. Family to transport at D/C    Patient/Family in Agreement with Plan  yes    Plan Comments  Met with pt at bedside and INN obtained. Denies any needs at present. Haja Post RN-BC        Discharge Codes    No documentation.       Expected Discharge Date and Time     Expected Discharge Date Expected Discharge Time    Dec 18, 2020             Haja Post, RN

## 2020-12-18 NOTE — PROGRESS NOTES
[facility]  Inpatient Progress Note    Reason for Follow Up:  Rectal bleeding (felt to be from hemorrhoids)    Subjective     Interval History:   Still with scant rectal bleeding.  His hemoglobin is 7.7 today after a transfusion of 1 unit of packed red blood cells yesterday.  Patient is to get IV iron.    Current Facility-Administered Medications:   •  acetaminophen (TYLENOL) tablet 650 mg, 650 mg, Oral, Q6H PRN, Albert Pleitez MD  •  albuterol (PROVENTIL) nebulizer solution 0.083% 2.5 mg/3mL, 2.5 mg, Nebulization, Q6H PRN, Albert Pleitez MD  •  docusate sodium (COLACE) capsule 100 mg, 100 mg, Oral, BID, Mesha Sanchez MD, 100 mg at 12/18/20 0943  •  ferrous sulfate tablet 325 mg, 325 mg, Oral, BID With Meals, Robert Mota MD, 325 mg at 12/18/20 0942  •  furosemide (LASIX) tablet 20 mg, 20 mg, Oral, Daily, Albert Pleitez MD, 20 mg at 12/18/20 0943  •  hydrocortisone (ANUSOL-HC) suppository 25 mg, 25 mg, Rectal, BID, Mesha Sanchez MD, 25 mg at 12/18/20 0943  •  iron sucrose (VENOFER) 300 mg in sodium chloride 0.9 % 250 mL IVPB, 300 mg, Intravenous, Q24H, Mulugeta Ross MD, Last Rate: 167 mL/hr at 12/17/20 1709, 300 mg at 12/17/20 1709  •  metoprolol tartrate (LOPRESSOR) tablet 25 mg, 25 mg, Oral, BID, Albert Pleitez MD, 25 mg at 12/18/20 0943  •  pantoprazole (PROTONIX) EC tablet 40 mg, 40 mg, Oral, BID AC, Albert Pleitez MD, 40 mg at 12/18/20 0630  •  PARoxetine (PAXIL) tablet 40 mg, 40 mg, Oral, QAM, Albert Pleitez MD, 40 mg at 12/18/20 0629  •  potassium chloride (K-DUR,KLOR-CON) ER tablet 10 mEq, 10 mEq, Oral, Daily, Albert Pleitez MD, 10 mEq at 12/18/20 0943  •  pramipexole (MIRAPEX) tablet 1.5 mg, 1.5 mg, Oral, BID, Albert Pleitez MD, 1.5 mg at 12/18/20 0943  Review of Systems:    The following systems were reviewed and negative;  constitution, ENT, respiratory, cardiovascular,  genitourinary, musculoskeletal and neurological    Objective     Vital Signs  Temp:  [97.7 °F (36.5 °C)-99.5 °F (37.5 °C)] 99.5 °F (37.5 °C)  Heart Rate:  [62-73] 70  Resp:  [16-18] 18  BP: (103-141)/(52-82) 110/52  Body mass index is 24.9 kg/m².    Intake/Output Summary (Last 24 hours) at 12/18/2020 0955  Last data filed at 12/18/2020 0631  Gross per 24 hour   Intake 1030 ml   Output --   Net 1030 ml     No intake/output data recorded.     Physical Exam:   General: patient awake, alert and cooperative   Eyes: Normal lids and lashes, no scleral icterus   Neck: supple, normal ROM   Skin: warm and dry, not jaundiced   Cardiovascular: regular rhythm and rate, no murmurs auscultated   Pulm: clear to auscultation bilaterally, regular and unlabored   Abdomen: soft, nontender, nondistended; normal bowel sounds   Extremities: no rash or edema   Psychiatric: Normal mood and behavior; memory intact     Results Review:     I reviewed the patient's new clinical results.    Results from last 7 days   Lab Units 12/18/20  0737 12/18/20  0348 12/17/20  1951 12/17/20  0748  12/16/20  0833   WBC 10*3/mm3  --  4.88  --  3.19*  --  3.11*   HEMOGLOBIN g/dL 7.7* 8.9* 8.8* 7.1*   < > 7.5*  7.5*   HEMATOCRIT % 23.1* 27.7* 27.2* 21.3*   < > 22.7*  22.7*   PLATELETS 10*3/mm3  --  130*  --  106*  --  102*    < > = values in this interval not displayed.     Results from last 7 days   Lab Units 12/16/20  2142 12/16/20  0833 12/15/20  1041   SODIUM mmol/L  --  139 141   POTASSIUM mmol/L 4.1 4.1 4.1   CHLORIDE mmol/L  --  110* 112*   CO2 mmol/L  --  21.8* 22.0   BUN mg/dL  --  14 19   CREATININE mg/dL  --  1.03 1.19   CALCIUM mg/dL  --  8.1* 8.4*   BILIRUBIN mg/dL  --   --  0.2   ALK PHOS U/L  --   --  89   ALT (SGPT) U/L  --   --  12   AST (SGOT) U/L  --   --  22   GLUCOSE mg/dL  --  85 122*     Results from last 7 days   Lab Units 12/16/20  0833   INR  1.27*     Lab Results   Lab Value Date/Time    LIPASE 18 08/30/2016 4755        Radiology:  XR Chest 1 View   Final Result          Assessment/Plan     Patient Active Problem List   Diagnosis   • Adenocarcinoma of esophagus (CMS/HCC)   • Adenomatous polyp of colon   • Malignant neoplasm of prostate (CMS/HCC)   • RLS (restless legs syndrome)   • Recurrent major depressive disorder, in partial remission (CMS/HCC)   • Hypertension   • Anemia   • Insomnia due to other mental disorder   • Peripheral polyneuropathy   • B12 deficiency   • Postsurgical malabsorption   • Lymphedema   • Iron deficiency   • Gastroparesis   • Acute deep vein thrombosis (DVT) of popliteal vein of left lower extremity (CMS/HCC)   • Tremor of both hands   • Gastrointestinal hemorrhage   • Acute blood loss anemia   • Pancytopenia (CMS/HCC)   • Chronic venous hypertension due to DVT   • COPD (chronic obstructive pulmonary disease) (CMS/HCC)   • Other hemorrhoids       Assessment/Recommendations:    1. Lower GI bleeding while on Eliquis. Colonoscopy 12/16 showed bleeding internal hemorrhoids. On Anusol HC suppostories. Hold Eliquis for one week if possible.  Given that he is still having scant rectal bleeding Dr. Flynn Julio is considering hemorrhoidectomy?  2. Iron deficiency anemia - Continue serial H/H and transfuse as needed.  The patient is to get intravenous iron.    I discussed the patients findings and my recommendations with patient.    Robert Mota MD

## 2020-12-18 NOTE — PROGRESS NOTES
[facility] INPATIENT PROGRESS NOTE    Length of Stay:  3 days    CHIEF COMPLAINT/REASON FOR VISIT:  GI bleed and eliquis on hold     Interval history:  Mildly febrile at 99.5 this morning, his other vital signs are stable.  He received 1 unit of PRBC and venofer 300 mg yesterday. Scheduled to receive another dose of venofer today.  Follow-up studies this morning: H&H 7.7 and 23.1, WBC 4.88, and Platelets 130,000.  Plan is likely for discharge home today.  He has follow-up scheduled with Dr. Phelan on 1/5/2021    ROS:  14 systems reviewed with pertinent positives and negatives in the HPI. Reviewed today.    OBJECTIVE:  Vitals:    12/17/20 1934 12/17/20 2028 12/17/20 2318 12/18/20 0743   BP: 125/63 114/59 133/66 110/52   BP Location: Right arm Right arm Right arm Right arm   Patient Position: Sitting Lying Sitting Lying   Pulse: 73 67 66 70   Resp: 18 16 18 18   Temp: 98 °F (36.7 °C) 98.2 °F (36.8 °C) 97.7 °F (36.5 °C) 99.5 °F (37.5 °C)   TempSrc: Oral Oral Oral Oral   SpO2:  96% 97% 96%   Weight:       Height:             PHYSICAL EXAMINATION:  General:  Chest/Lungs:  Heart:  Abdomen/GI:  Extremities:    DIAGNOSTIC DATA:  Results Review:     I reviewed the patient's new clinical results.    Results from last 7 days   Lab Units 12/18/20  0737 12/18/20  0348   WBC 10*3/mm3  --  4.88   HEMOGLOBIN g/dL 7.7* 8.9*   HEMATOCRIT % 23.1* 27.7*   PLATELETS 10*3/mm3  --  130*     Lab Results   Component Value Date    NEUTROABS 3.48 12/18/2020     Results from last 7 days   Lab Units 12/16/20  2142 12/16/20  0833   SODIUM mmol/L  --  139   POTASSIUM mmol/L 4.1 4.1   CHLORIDE mmol/L  --  110*   CO2 mmol/L  --  21.8*   BUN mg/dL  --  14   CREATININE mg/dL  --  1.03   GLUCOSE mg/dL  --  85   CALCIUM mg/dL  --  8.1*     Results from last 7 days   Lab Units 12/16/20  0833   INR  1.27*     Results from last 7 days   Lab Units 12/16/20  2142   MAGNESIUM mg/dL 2.0         IMAGING:    XR Chest 1 View 12.15.2020  CONCLUSION:  The right-sided pleural effusion has diminished in size,  small amount remains on the current examination. No edema or acute  airspace disease has developed, there is widening of the mediastinum  from previous gastric pull-through procedure. The remainder is  unremarkable.    Assessment/Plan   ASSESSMENT/PLAN:  This is a 70 y.o. male with:     This is a 70 y.o. male with:     *Esophageal adenocarcinoma. Initially T2N0M0. After neoadjuvant carboplatin/Taxol with radiation, achieved a pathologic CR at resection, 4/24/2015.   · As per NCCN guidelines, plan CAT scans every 6 months x1 year, then every 6 to 9 months on years 2 and years 3. Defer any surveillance EGDs to Dr. Gonzalez if he feels they are appropriate.   · Also as per NCCN guidelines, plan H and P every 3 to 6 months on years 1 and years 2, then every 6 to 12 months' time on years 3 through years 5 and then annually.   · EGD 6/13/17 by Dr. Gonzalez: No evidence of recurrence.  · CT scan 3/2/18 (this completed 3 years of surveillance CT): No evidence of recurrence.  Plan no more surveillance CT.  No signs of recurrence.  Remains in remission.     *Recurrent DVT, prior to cancer diagnosis.    · Dr. Bolton manages his Coumadin.   · Chronic left leg larger than right, since DVT  No signs of recurrent clots.     *Leukocytopenia, anemia, thrombocytopenia.   ·  He had a white blood cell count in the upper 3s and a hemoglobin in the upper 12s with a normal platelet count prior to beginning chemotherapy. We will monitor this.   WBC 4.1     *Persistent cough.  He has seen Dr. Maher Sayied for this.    He did not complain of this today.     *Previously complained of emesis occurring 5 hours after eating on average once every month or 2.  No nausea otherwise.  Denies dysphagia or odynophagia.    Unchanged.  Occurring on average once every couple of months.  He did not complain of this today     *Iron deficiency anemia  · Hb mostly around 11  · On 4/15/2019, ferritin 29.7, 13%  saturation.  Serum folate normal.  · On oral iron daily through PCP.  · On 8/23/19, ferritin 65, 14%.   · Increased oral iron.   · On 10/15/19, ferritin 72, 10%.  · On 10/25/2019, stopped oral iron since it was not helping.    ? 2 doses Injectafer.  · On 12/13/2019, ferritin 708, 15% saturation, Hb 10.4.  Therefore, no IV iron.  Monitor.  · Admission December 15, 2020 with ferritin 39.9, iron profile with iron of 53, iron saturation 17%, additional IV iron planned.        *Source of iron deficiency.  · Last colonoscopy 2/26/2019 by Dr. Millan.  Last EGD 6/13/2017 by Dr. Gonzalez.  · Suspect he has an absorption issue due to previous esophageal surgery.     *B12 deficiency.  · On 6/6/2019, B12 <150  · On B12 injections through PCP.        *Patient admitted December 15, 2020 with apparent gastrointestinal hemorrhage with blood in his stool.  · Transfusion planned  · GI assessment with hemorrhoidal bleeding recognized on colonoscopy.  · General surgery consultation with plans for additional transfusion, likely discharge off Eliquis, and suppositories twice a day and determine as an outpatient for hemorrhoidectomy.           RECOMMENDATIONS/PLAN:   1. Continue to hold Eliquis  2. Previous iron profile December 15 with iron of 53, 17% saturation, TIBC 311, ferritin of 39.9  3. Proceed with IV iron during this hospitalization  4. Discussed with his primary hematologist oncologist who is scheduled to see him in January 5, 2021

## 2020-12-18 NOTE — TELEPHONE ENCOUNTER
Spoke to pt regarding scheduling procedure. He would like to have this done asap if possible. Can I schedule this for 12/23?

## 2020-12-21 ENCOUNTER — TRANSITIONAL CARE MANAGEMENT TELEPHONE ENCOUNTER (OUTPATIENT)
Dept: CALL CENTER | Facility: HOSPITAL | Age: 70
End: 2020-12-21

## 2020-12-21 ENCOUNTER — APPOINTMENT (OUTPATIENT)
Dept: PREADMISSION TESTING | Facility: HOSPITAL | Age: 70
End: 2020-12-21

## 2020-12-21 VITALS
RESPIRATION RATE: 16 BRPM | WEIGHT: 222 LBS | HEIGHT: 77 IN | SYSTOLIC BLOOD PRESSURE: 126 MMHG | OXYGEN SATURATION: 98 % | HEART RATE: 82 BPM | DIASTOLIC BLOOD PRESSURE: 60 MMHG | TEMPERATURE: 98.5 F | BODY MASS INDEX: 26.21 KG/M2

## 2020-12-21 LAB
DEPRECATED RDW RBC AUTO: 48.8 FL (ref 37–54)
ERYTHROCYTE [DISTWIDTH] IN BLOOD BY AUTOMATED COUNT: 14.5 % (ref 12.3–15.4)
HCT VFR BLD AUTO: 21.7 % (ref 37.5–51)
HGB BLD-MCNC: 7.1 G/DL (ref 13–17.7)
MCH RBC QN AUTO: 30.3 PG (ref 26.6–33)
MCHC RBC AUTO-ENTMCNC: 32.7 G/DL (ref 31.5–35.7)
MCV RBC AUTO: 92.7 FL (ref 79–97)
PLATELET # BLD AUTO: 153 10*3/MM3 (ref 140–450)
PMV BLD AUTO: 9.7 FL (ref 6–12)
RBC # BLD AUTO: 2.34 10*6/MM3 (ref 4.14–5.8)
WBC # BLD AUTO: 4.01 10*3/MM3 (ref 3.4–10.8)

## 2020-12-21 PROCEDURE — 85027 COMPLETE CBC AUTOMATED: CPT

## 2020-12-21 PROCEDURE — C9803 HOPD COVID-19 SPEC COLLECT: HCPCS

## 2020-12-21 PROCEDURE — U0004 COV-19 TEST NON-CDC HGH THRU: HCPCS

## 2020-12-21 PROCEDURE — 36415 COLL VENOUS BLD VENIPUNCTURE: CPT

## 2020-12-21 NOTE — DISCHARGE INSTRUCTIONS
Take the following medications the morning of surgery:  PANTOPRAZOLE, METOPROLOL, PAXIL, ALBUTEROL    If you are on prescription narcotic pain medication to control your pain you may also take that medication the morning of surgery.    PLEASE ARRIVE AT THE HOSPITAL AT 10 AM ON December 23, 2020    General Instructions:  • Do not eat solid food after midnight the night before surgery.  • You may drink clear liquids day of surgery but must stop at least one hour before your hospital arrival time.  • NOTHING TO DRINK AFTER 9 AM  • It is beneficial for you to have a clear drink that contains carbohydrates the day of surgery.  We suggest a 12 to 20 ounce bottle of Gatorade or Powerade for non-diabetic patients or a 12 to 20 ounce bottle of G2 or Powerade Zero for diabetic patients. (Pediatric patients, are not advised to drink a 12 to 20 ounce carbohydrate drink)    Clear liquids are liquids you can see through.  Nothing red in color.     Plain water                               Sports drinks  Sodas                                   Gelatin (Jell-O)  Fruit juices without pulp such as white grape juice and apple juice  Popsicles that contain no fruit or yogurt  Tea or coffee (no cream or milk added)  Gatorade / Powerade  G2 / Powerade Zero    • Infants may have breast milk up to four hours before surgery.  • Infants drinking formula may drink formula up to six hours before surgery.   • Patients who avoid smoking, chewing tobacco and alcohol for 4 weeks prior to surgery have a reduced risk of post-operative complications.  Quit smoking as many days before surgery as you can.  • Do not smoke, use chewing tobacco or drink alcohol the day of surgery.   • If applicable bring your C-PAP/ BI-PAP machine.  • Bring any papers given to you in the doctor’s office.  • Wear clean comfortable clothes.  • Do not wear contact lenses, false eyelashes or make-up.  Bring a case for your glasses.   • Bring crutches or walker if  applicable.  • Remove all piercings.  Leave jewelry and any other valuables at home.  • Hair extensions with metal clips must be removed prior to surgery.  • The Pre-Admission Testing nurse will instruct you to bring medications if unable to obtain an accurate list in Pre-Admission Testing.      Preventing a Surgical Site Infection:  • For 2 to 3 days before surgery, avoid shaving with a razor because the razor can irritate skin and make it easier to develop an infection.    • Any areas of open skin can increase the risk of a post-operative wound infection by allowing bacteria to enter and travel throughout the body.  Notify your surgeon if you have any skin wounds / rashes even if it is not near the expected surgical site.  The area will need assessed to determine if surgery should be delayed until it is healed.  • The night prior to surgery shower using a fresh bar of anti-bacterial soap (such as Dial) and clean washcloth.  Sleep in a clean bed with clean clothing.  Do not allow pets to sleep with you.  • Ask your surgeon if you will be receiving antibiotics prior to surgery.  • Make sure you, your family, and all healthcare providers clean their hands with soap and water or an alcohol based hand  before caring for you or your wound.    CHLORHEXIDINE CLOTH INSTRUCTIONS  The morning of surgery follow these instructions using the Chlorhexidine cloths you've been given.  These steps reduce bacteria on the body.  Do not use the cloths near your eyes, ears mouth, genitalia or on open wounds.  Throw the cloths away after use but do not try to flush them down a toilet.      • Open and remove one cloth at a time from the package.    • Leave the cloth unfolded and begin the bathing.  • Massage the skin with the cloths using gentle pressure to remove bacteria.  Do not scrub harshly.   • Follow the steps below with one 2% CHG cloth per area (6 total cloths).  • One cloth for neck, shoulders and chest.  • One cloth  for both arms, hands, fingers and underarms (do underarms last).  • One cloth for the abdomen followed by groin.  • One cloth for right leg and foot including between the toes.  • One cloth for left leg and foot including between the toes.  • The last cloth is to be used for the back of the neck, back and buttocks.    Allow the CHG to air dry 3 minutes on the skin which will give it time to work and decrease the chance of irritation.  The skin may feel sticky until it is dry.  Do not rinse with water or any other liquid or you will lose the beneficial effects of the CHG.  If mild skin irritation occurs, do rinse the skin to remove the CHG.  Report this to the nurse at time of admission.  Do not apply lotions, creams, ointments, deodorants or perfumes after using the clothes. Dress in clean clothes before coming to the hospital.    Day of surgery:  Your arrival time is approximately two hours before your scheduled surgery time.  Upon arrival, a Pre-op nurse and Anesthesiologist will review your health history, obtain vital signs, and answer questions you may have.  The only belongings needed at this time will be a list of your home medications and if applicable your C-PAP/BI-PAP machine.  A Pre-op nurse will start an IV and you may receive medication in preparation for surgery, including something to help you relax.     Please be aware that surgery does come with discomfort.  We want to make every effort to control your discomfort so please discuss any uncontrolled symptoms with your nurse.   Your doctor will most likely have prescribed pain medications.      If you are going home after surgery you will receive individualized written care instructions before being discharged.  A responsible adult must drive you to and from the hospital on the day of your surgery and stay with you for 24 hours.  Discharge prescriptions can be filled by the hospital pharmacy during regular pharmacy hours.  If you are having surgery  late in the day/evening your prescription may be e-prescribed to your pharmacy.  Please verify your pharmacy hours or chose a 24 hour pharmacy to avoid not having access to your prescription because your pharmacy has closed for the day.    If you are staying overnight following surgery, you will be transported to your hospital room following the recovery period.  [facility] has [facility] rooms.    If you have any questions please call Pre-Admission Testing at [phone].  Deductibles and co-payments are collected on the day of service. Please be prepared to pay the required co-pay, deductible or deposit on the day of service as defined by your plan.    Patient Education for Self-Quarantine Process    Following your COVID testing, we strongly recommend that you do not leave your home after you have been tested for COVID except to get medical care. This includes not going to work, school or to public areas.  If this is not possible for you to do please limit your activities to only required outings.  Be sure to wear a mask when you are with other people, practice social distancing and wash your hands frequently.      The following items provide additional details to keep you safe.  • Wash your hands with soap and water frequently for at least 20 seconds.   • Avoid touching your eyes, nose and mouth with unwashed hands.  • Do not share anything - utensils, towels, food from the same bowl.   • Have your own utensils, drinking glass, dishes, towels and bedding.   • Do not have visitors.   • Do use FaceTime to stay in touch with family and friends.  • You should stay in a specific room away from others if possible.   • Stay at least 6 feet away from others in the home if you cannot have a dedicated room to yourself.   • Do not snuggle with your pet. While the CDC says there is no evidence that pets can spread COVID-19 or be infected from humans, it is probably best to avoid “petting, snuggling, being  kissed or licked and sharing food (during self-quarantine)”, according to the CDC.   • Sanitize household surfaces daily. Include all high touch areas (door handles, light switches, phones, countertops, etc.)  • Do not share a bathroom with others, if possible.   • Wear a mask around others in your home if you are unable to stay in a separate room or 6 feet apart. If  you are unable to wear a mask, have your family member wear a mask if they must be within 6 feet of you.   Call your surgeon immediately if you experience any of the following symptoms:  • Sore Throat  • Shortness of Breath or difficulty breathing  • Cough  • Chills  • Body soreness or muscle pain  • Headache  • Fever  • New loss of taste or smell  • Do not arrive for your surgery ill.  Your procedure will need to be rescheduled to another time.  You will need to call your physician before the day of surgery to avoid any unnecessary exposure to hospital staff as well as other patients.

## 2020-12-21 NOTE — OUTREACH NOTE
Call Center TCM Note      Responses   [facility]   Does the patient have one of the following disease processes/diagnoses(primary or secondary)?  Other   TCM attempt successful?  Yes   Call start time  0903   Call end time  0909   General alerts for this patient  Coming back for a surgery on Wed 12/23/2020   Discharge diagnosis  Acute blood loss anemia,  colonoscopy   Meds reviewed with patient/caregiver?  Yes   Is the patient having any side effects they believe may be caused by any medication additions or changes?  No   Does the patient have all medications ordered at discharge?  Yes   Is the patient taking all medications as directed (includes completed medication regime)?  Yes   Does the patient have a primary care provider?   Yes   Does the patient have an appointment with their PCP within 7 days of discharge?  No   Comments regarding PCP  Brandin Bolton MD PCP. Patient states that he has surgery scheuled for  HEMORRHOIDECTOMY on Wed Dec 23/2020 so he declined followup for now.    What is preventing the patient from scheduling follow up appointments within 7 days of discharge?  Haven't had time   Nursing Interventions  Educated patient on importance of making appointment, Advised patient to make appointment   Has the patient kept scheduled appointments due by today?  N/A   Has home health visited the patient within 72 hours of discharge?  N/A   Psychosocial issues?  No   Did the patient receive a copy of their discharge instructions?  Yes   Nursing interventions  Reviewed instructions with patient   What is the patient's perception of their health status since discharge?  Improving   Is the patient/caregiver able to teach back signs and symptoms related to disease process for when to call PCP?  Yes   Is the patient/caregiver able to teach back signs and symptoms related to disease process for when to call 911?  Yes   Is the patient/caregiver able to teach back the  hierarchy of who to call/visit for symptoms/problems? PCP, Specialist, Home health nurse, Urgent Care, ED, 911  Yes   If the patient is a current smoker, are they able to teach back resources for cessation?  Not a smoker   TCM call completed?  Yes          Ismael Newberry RN    12/21/2020, 09:09 EST

## 2020-12-22 ENCOUNTER — CLINICAL SUPPORT (OUTPATIENT)
Dept: INTERNAL MEDICINE | Age: 70
End: 2020-12-22

## 2020-12-22 ENCOUNTER — TELEPHONE (OUTPATIENT)
Dept: SURGERY | Facility: CLINIC | Age: 70
End: 2020-12-22

## 2020-12-22 LAB — SARS-COV-2 RNA RESP QL NAA+PROBE: NOT DETECTED

## 2020-12-22 PROCEDURE — 96372 THER/PROPH/DIAG INJ SC/IM: CPT | Performed by: INTERNAL MEDICINE

## 2020-12-22 RX ADMIN — CYANOCOBALAMIN 1000 MCG: 1000 INJECTION, SOLUTION INTRAMUSCULAR; SUBCUTANEOUS at 11:39

## 2020-12-22 NOTE — TELEPHONE ENCOUNTER
Kalina with PAT called regarding abnormal labs. Pt has hemorrhoidectemy tomorrow, 12/23. Please advise if need to reschedule.

## 2020-12-23 ENCOUNTER — ANESTHESIA EVENT (OUTPATIENT)
Dept: PERIOP | Facility: HOSPITAL | Age: 70
End: 2020-12-23

## 2020-12-23 ENCOUNTER — ANESTHESIA (OUTPATIENT)
Dept: PERIOP | Facility: HOSPITAL | Age: 70
End: 2020-12-23

## 2020-12-23 ENCOUNTER — HOSPITAL ENCOUNTER (OUTPATIENT)
Facility: HOSPITAL | Age: 70
Setting detail: HOSPITAL OUTPATIENT SURGERY
Discharge: HOME | End: 2020-12-23
Attending: SURGERY | Admitting: SURGERY

## 2020-12-23 VITALS
RESPIRATION RATE: 16 BRPM | DIASTOLIC BLOOD PRESSURE: 76 MMHG | BODY MASS INDEX: 25.56 KG/M2 | WEIGHT: 216.49 LBS | TEMPERATURE: 98.3 F | SYSTOLIC BLOOD PRESSURE: 136 MMHG | HEART RATE: 70 BPM | HEIGHT: 77 IN | OXYGEN SATURATION: 99 %

## 2020-12-23 DIAGNOSIS — K64.9 BLEEDING HEMORRHOID: ICD-10-CM

## 2020-12-23 LAB
ABO GROUP BLD: NORMAL
BLD GP AB SCN SERPL QL: NEGATIVE
RH BLD: POSITIVE
T&S EXPIRATION DATE: NORMAL

## 2020-12-23 PROCEDURE — C9290 INJ, BUPIVACAINE LIPOSOME: HCPCS | Performed by: SURGERY

## 2020-12-23 PROCEDURE — 25010000003 CEFAZOLIN IN DEXTROSE 2-4 GM/100ML-% SOLUTION: Performed by: SURGERY

## 2020-12-23 PROCEDURE — 25010000002 PROPOFOL 10 MG/ML EMULSION: Performed by: NURSE ANESTHETIST, CERTIFIED REGISTERED

## 2020-12-23 PROCEDURE — 25010000002 FUROSEMIDE PER 20 MG: Performed by: SURGERY

## 2020-12-23 PROCEDURE — 86901 BLOOD TYPING SEROLOGIC RH(D): CPT | Performed by: SURGERY

## 2020-12-23 PROCEDURE — P9016 RBC LEUKOCYTES REDUCED: HCPCS

## 2020-12-23 PROCEDURE — 86850 RBC ANTIBODY SCREEN: CPT | Performed by: SURGERY

## 2020-12-23 PROCEDURE — 25010000002 HYDROMORPHONE PER 4 MG: Performed by: NURSE ANESTHETIST, CERTIFIED REGISTERED

## 2020-12-23 PROCEDURE — 25010000002 ONDANSETRON PER 1 MG: Performed by: NURSE ANESTHETIST, CERTIFIED REGISTERED

## 2020-12-23 PROCEDURE — 25010000002 DEXAMETHASONE PER 1 MG: Performed by: NURSE ANESTHETIST, CERTIFIED REGISTERED

## 2020-12-23 PROCEDURE — 46946 INT HRHC LIG 2+HROID W/O IMG: CPT | Performed by: SURGERY

## 2020-12-23 PROCEDURE — 86923 COMPATIBILITY TEST ELECTRIC: CPT

## 2020-12-23 PROCEDURE — 25010000003 BUPIVACAINE LIPOSOME 1.3 % SUSPENSION: Performed by: SURGERY

## 2020-12-23 PROCEDURE — P9040 RBC LEUKOREDUCED IRRADIATED: HCPCS

## 2020-12-23 PROCEDURE — 88304 TISSUE EXAM BY PATHOLOGIST: CPT | Performed by: SURGERY

## 2020-12-23 PROCEDURE — 86900 BLOOD TYPING SEROLOGIC ABO: CPT

## 2020-12-23 PROCEDURE — 25010000002 FENTANYL CITRATE (PF) 100 MCG/2ML SOLUTION: Performed by: NURSE ANESTHETIST, CERTIFIED REGISTERED

## 2020-12-23 PROCEDURE — 86900 BLOOD TYPING SEROLOGIC ABO: CPT | Performed by: SURGERY

## 2020-12-23 PROCEDURE — 36430 TRANSFUSION BLD/BLD COMPNT: CPT

## 2020-12-23 RX ORDER — BUPIVACAINE HYDROCHLORIDE AND EPINEPHRINE 5; 5 MG/ML; UG/ML
INJECTION, SOLUTION EPIDURAL; INTRACAUDAL; PERINEURAL AS NEEDED
Status: DISCONTINUED | OUTPATIENT
Start: 2020-12-23 | End: 2020-12-23 | Stop reason: HOSPADM

## 2020-12-23 RX ORDER — PROPOFOL 10 MG/ML
VIAL (ML) INTRAVENOUS AS NEEDED
Status: DISCONTINUED | OUTPATIENT
Start: 2020-12-23 | End: 2020-12-23 | Stop reason: SURG

## 2020-12-23 RX ORDER — HYDROMORPHONE HCL 110MG/55ML
PATIENT CONTROLLED ANALGESIA SYRINGE INTRAVENOUS AS NEEDED
Status: DISCONTINUED | OUTPATIENT
Start: 2020-12-23 | End: 2020-12-23 | Stop reason: SURG

## 2020-12-23 RX ORDER — FENTANYL CITRATE 50 UG/ML
50 INJECTION, SOLUTION INTRAMUSCULAR; INTRAVENOUS
Status: DISCONTINUED | OUTPATIENT
Start: 2020-12-23 | End: 2020-12-23 | Stop reason: HOSPADM

## 2020-12-23 RX ORDER — FUROSEMIDE 10 MG/ML
20 INJECTION INTRAMUSCULAR; INTRAVENOUS ONCE
Status: DISCONTINUED | OUTPATIENT
Start: 2020-12-23 | End: 2020-12-23 | Stop reason: HOSPADM

## 2020-12-23 RX ORDER — OXYCODONE HYDROCHLORIDE AND ACETAMINOPHEN 5; 325 MG/1; MG/1
TABLET ORAL
Qty: 40 TABLET | Refills: 0 | Status: SHIPPED | OUTPATIENT
Start: 2020-12-23 | End: 2021-01-07

## 2020-12-23 RX ORDER — FUROSEMIDE 10 MG/ML
20 INJECTION INTRAMUSCULAR; INTRAVENOUS ONCE
Status: COMPLETED | OUTPATIENT
Start: 2020-12-23 | End: 2020-12-23

## 2020-12-23 RX ORDER — HYDRALAZINE HYDROCHLORIDE 20 MG/ML
5 INJECTION INTRAMUSCULAR; INTRAVENOUS
Status: DISCONTINUED | OUTPATIENT
Start: 2020-12-23 | End: 2020-12-23 | Stop reason: HOSPADM

## 2020-12-23 RX ORDER — LABETALOL HYDROCHLORIDE 5 MG/ML
5 INJECTION, SOLUTION INTRAVENOUS
Status: DISCONTINUED | OUTPATIENT
Start: 2020-12-23 | End: 2020-12-23 | Stop reason: HOSPADM

## 2020-12-23 RX ORDER — ONDANSETRON 2 MG/ML
4 INJECTION INTRAMUSCULAR; INTRAVENOUS ONCE AS NEEDED
Status: DISCONTINUED | OUTPATIENT
Start: 2020-12-23 | End: 2020-12-23 | Stop reason: HOSPADM

## 2020-12-23 RX ORDER — HYDROCODONE BITARTRATE AND ACETAMINOPHEN 7.5; 325 MG/1; MG/1
1 TABLET ORAL ONCE AS NEEDED
Status: DISCONTINUED | OUTPATIENT
Start: 2020-12-23 | End: 2020-12-23 | Stop reason: HOSPADM

## 2020-12-23 RX ORDER — DIPHENHYDRAMINE HCL 25 MG
25 CAPSULE ORAL
Status: DISCONTINUED | OUTPATIENT
Start: 2020-12-23 | End: 2020-12-23 | Stop reason: HOSPADM

## 2020-12-23 RX ORDER — FAMOTIDINE 10 MG/ML
20 INJECTION, SOLUTION INTRAVENOUS ONCE
Status: COMPLETED | OUTPATIENT
Start: 2020-12-23 | End: 2020-12-23

## 2020-12-23 RX ORDER — SODIUM CHLORIDE, SODIUM LACTATE, POTASSIUM CHLORIDE, CALCIUM CHLORIDE 600; 310; 30; 20 MG/100ML; MG/100ML; MG/100ML; MG/100ML
9 INJECTION, SOLUTION INTRAVENOUS CONTINUOUS
Status: DISCONTINUED | OUTPATIENT
Start: 2020-12-23 | End: 2020-12-23 | Stop reason: HOSPADM

## 2020-12-23 RX ORDER — NALOXONE HCL 0.4 MG/ML
0.2 VIAL (ML) INJECTION AS NEEDED
Status: DISCONTINUED | OUTPATIENT
Start: 2020-12-23 | End: 2020-12-23 | Stop reason: HOSPADM

## 2020-12-23 RX ORDER — HYDROMORPHONE HYDROCHLORIDE 1 MG/ML
0.5 INJECTION, SOLUTION INTRAMUSCULAR; INTRAVENOUS; SUBCUTANEOUS
Status: DISCONTINUED | OUTPATIENT
Start: 2020-12-23 | End: 2020-12-23 | Stop reason: HOSPADM

## 2020-12-23 RX ORDER — ONDANSETRON 4 MG/1
4 TABLET, FILM COATED ORAL EVERY 6 HOURS PRN
Qty: 10 TABLET | Refills: 0 | Status: SHIPPED | OUTPATIENT
Start: 2020-12-23 | End: 2021-01-07

## 2020-12-23 RX ORDER — CEFAZOLIN SODIUM 2 G/100ML
2 INJECTION, SOLUTION INTRAVENOUS ONCE
Status: COMPLETED | OUTPATIENT
Start: 2020-12-23 | End: 2020-12-23

## 2020-12-23 RX ORDER — EPHEDRINE SULFATE 50 MG/ML
5 INJECTION, SOLUTION INTRAVENOUS ONCE AS NEEDED
Status: DISCONTINUED | OUTPATIENT
Start: 2020-12-23 | End: 2020-12-23 | Stop reason: HOSPADM

## 2020-12-23 RX ORDER — DEXAMETHASONE SODIUM PHOSPHATE 10 MG/ML
INJECTION INTRAMUSCULAR; INTRAVENOUS AS NEEDED
Status: DISCONTINUED | OUTPATIENT
Start: 2020-12-23 | End: 2020-12-23 | Stop reason: SURG

## 2020-12-23 RX ORDER — EPHEDRINE SULFATE 50 MG/ML
INJECTION, SOLUTION INTRAVENOUS AS NEEDED
Status: DISCONTINUED | OUTPATIENT
Start: 2020-12-23 | End: 2020-12-23 | Stop reason: SURG

## 2020-12-23 RX ORDER — LIDOCAINE HYDROCHLORIDE 20 MG/ML
INJECTION, SOLUTION INFILTRATION; PERINEURAL AS NEEDED
Status: DISCONTINUED | OUTPATIENT
Start: 2020-12-23 | End: 2020-12-23 | Stop reason: SURG

## 2020-12-23 RX ORDER — FLUMAZENIL 0.1 MG/ML
0.2 INJECTION INTRAVENOUS AS NEEDED
Status: DISCONTINUED | OUTPATIENT
Start: 2020-12-23 | End: 2020-12-23 | Stop reason: HOSPADM

## 2020-12-23 RX ORDER — OXYCODONE AND ACETAMINOPHEN 7.5; 325 MG/1; MG/1
1 TABLET ORAL ONCE AS NEEDED
Status: DISCONTINUED | OUTPATIENT
Start: 2020-12-23 | End: 2020-12-23 | Stop reason: HOSPADM

## 2020-12-23 RX ORDER — FENTANYL CITRATE 50 UG/ML
INJECTION, SOLUTION INTRAMUSCULAR; INTRAVENOUS AS NEEDED
Status: DISCONTINUED | OUTPATIENT
Start: 2020-12-23 | End: 2020-12-23 | Stop reason: SURG

## 2020-12-23 RX ORDER — SODIUM CHLORIDE 0.9 % (FLUSH) 0.9 %
10 SYRINGE (ML) INJECTION EVERY 12 HOURS SCHEDULED
Status: DISCONTINUED | OUTPATIENT
Start: 2020-12-23 | End: 2020-12-23 | Stop reason: HOSPADM

## 2020-12-23 RX ORDER — SODIUM CHLORIDE 0.9 % (FLUSH) 0.9 %
10 SYRINGE (ML) INJECTION AS NEEDED
Status: DISCONTINUED | OUTPATIENT
Start: 2020-12-23 | End: 2020-12-23 | Stop reason: HOSPADM

## 2020-12-23 RX ORDER — PROMETHAZINE HYDROCHLORIDE 25 MG/1
25 SUPPOSITORY RECTAL ONCE AS NEEDED
Status: DISCONTINUED | OUTPATIENT
Start: 2020-12-23 | End: 2020-12-23 | Stop reason: HOSPADM

## 2020-12-23 RX ORDER — ONDANSETRON 2 MG/ML
INJECTION INTRAMUSCULAR; INTRAVENOUS AS NEEDED
Status: DISCONTINUED | OUTPATIENT
Start: 2020-12-23 | End: 2020-12-23 | Stop reason: SURG

## 2020-12-23 RX ORDER — DIPHENHYDRAMINE HYDROCHLORIDE 50 MG/ML
12.5 INJECTION INTRAMUSCULAR; INTRAVENOUS
Status: DISCONTINUED | OUTPATIENT
Start: 2020-12-23 | End: 2020-12-23 | Stop reason: HOSPADM

## 2020-12-23 RX ORDER — MAGNESIUM HYDROXIDE 1200 MG/15ML
LIQUID ORAL AS NEEDED
Status: DISCONTINUED | OUTPATIENT
Start: 2020-12-23 | End: 2020-12-23 | Stop reason: HOSPADM

## 2020-12-23 RX ORDER — PROMETHAZINE HYDROCHLORIDE 25 MG/1
25 TABLET ORAL ONCE AS NEEDED
Status: DISCONTINUED | OUTPATIENT
Start: 2020-12-23 | End: 2020-12-23 | Stop reason: HOSPADM

## 2020-12-23 RX ADMIN — CEFAZOLIN SODIUM 2 G: 2 INJECTION, SOLUTION INTRAVENOUS at 12:41

## 2020-12-23 RX ADMIN — HYDROMORPHONE HYDROCHLORIDE 0.5 MG: 2 INJECTION, SOLUTION INTRAMUSCULAR; INTRAVENOUS; SUBCUTANEOUS at 13:48

## 2020-12-23 RX ADMIN — EPHEDRINE SULFATE 10 MG: 50 INJECTION INTRAVENOUS at 12:34

## 2020-12-23 RX ADMIN — LIDOCAINE HYDROCHLORIDE 80 MG: 20 INJECTION, SOLUTION INFILTRATION; PERINEURAL at 12:27

## 2020-12-23 RX ADMIN — FUROSEMIDE 20 MG: 10 INJECTION, SOLUTION INTRAMUSCULAR; INTRAVENOUS at 14:46

## 2020-12-23 RX ADMIN — DEXAMETHASONE SODIUM PHOSPHATE 8 MG: 10 INJECTION INTRAMUSCULAR; INTRAVENOUS at 12:37

## 2020-12-23 RX ADMIN — FENTANYL CITRATE 50 MCG: 50 INJECTION INTRAMUSCULAR; INTRAVENOUS at 12:27

## 2020-12-23 RX ADMIN — PROPOFOL 200 MG: 10 INJECTION, EMULSION INTRAVENOUS at 12:27

## 2020-12-23 RX ADMIN — PROPOFOL 200 MG: 10 INJECTION, EMULSION INTRAVENOUS at 12:30

## 2020-12-23 RX ADMIN — SODIUM CHLORIDE, POTASSIUM CHLORIDE, SODIUM LACTATE AND CALCIUM CHLORIDE 9 ML/HR: 600; 310; 30; 20 INJECTION, SOLUTION INTRAVENOUS at 11:08

## 2020-12-23 RX ADMIN — FENTANYL CITRATE 50 MCG: 50 INJECTION INTRAMUSCULAR; INTRAVENOUS at 12:57

## 2020-12-23 RX ADMIN — FAMOTIDINE 20 MG: 10 INJECTION INTRAVENOUS at 11:12

## 2020-12-23 RX ADMIN — ONDANSETRON HYDROCHLORIDE 4 MG: 2 SOLUTION INTRAMUSCULAR; INTRAVENOUS at 12:37

## 2020-12-23 RX ADMIN — METRONIDAZOLE 500 MG: 500 INJECTION, SOLUTION INTRAVENOUS at 12:06

## 2020-12-23 RX ADMIN — EPHEDRINE SULFATE 10 MG: 50 INJECTION INTRAVENOUS at 12:37

## 2020-12-23 NOTE — ANESTHESIA POSTPROCEDURE EVALUATION
Patient: Jose Hurtado    Procedure Summary     Date: 12/23/20 Room / Location: [facility] OR  / [facility] MAIN OR    Anesthesia Start: 1222 Anesthesia Stop: 1403    Procedure: HEMORRHOIDECTOMY (N/A Anus) Diagnosis:       Bleeding hemorrhoid      (Bleeding hemorrhoid [K64.9])    Surgeon: Billy Julio Jr., MD Provider: Estrada Florian MD    Anesthesia Type: general ASA Status: 3          Anesthesia Type: general    Vitals  Vitals Value Taken Time   /72 12/23/20 1530   Temp 36.8 °C (98.3 °F) 12/23/20 1530   Pulse 67 12/23/20 1536   Resp 14 12/23/20 1530   SpO2 97 % 12/23/20 1536   Vitals shown include unvalidated device data.        Post Anesthesia Care and Evaluation    Patient location during evaluation: bedside  Patient participation: complete - patient participated  Level of consciousness: awake  Pain management: adequate  Airway patency: patent  Anesthetic complications: No anesthetic complications  PONV Status: none  Cardiovascular status: acceptable  Respiratory status: acceptable  Hydration status: acceptable  Post Neuraxial Block status: Motor and sensory function returned to baseline

## 2020-12-23 NOTE — PERIOPERATIVE NURSING NOTE
Pt's wife Lena notified pt headed back to OR and Dr. OSIEL Julio will call her when procedure completed. Lena also updated on care given in preop, 1unit PRBC started and plan for IV Lasix post transfusion.

## 2020-12-23 NOTE — ANESTHESIA PREPROCEDURE EVALUATION
Anesthesia Evaluation     no history of anesthetic complications:               Airway   Mallampati: II  TM distance: >3 FB  Neck ROM: limited  Dental    (+) upper dentures    Pulmonary    (+) pneumonia , a smoker Former, COPD, sleep apnea,   (-) shortness of breath, recent URI  Cardiovascular     (+) hypertension, DVT, hyperlipidemia,   (-) dysrhythmias, angina      Neuro/Psych  (-) dizziness/light headedness, syncope    ROS Comment: Restless leg , tremers  GI/Hepatic/Renal/Endo    (+) obesity,  hiatal hernia, GERD (gastroparesis), GI bleeding (hospitatized last week , got 3 units of blood) lower ,     Musculoskeletal     Abdominal    Substance History      OB/GYN          Other   blood dyscrasia (Hbg 7.1) anemia,   history of cancer (prostate, esophagus)                  Anesthesia Plan    ASA 3     general   (To have blood infusion started prior to induction)  intravenous induction     Anesthetic plan, all risks, benefits, and alternatives have been provided, discussed and informed consent has been obtained with: patient.

## 2020-12-23 NOTE — ANESTHESIA PROCEDURE NOTES
Airway  Urgency: elective    Date/Time: 12/23/2020 12:28 PM  Airway not difficult    General Information and Staff    Patient location during procedure: OR  CRNA: Astrid Johnson CRNA    Indications and Patient Condition  Indications for airway management: airway protection    Preoxygenated: yes  Mask difficulty assessment: 0 - not attempted    Final Airway Details  Final airway type: supraglottic airway      Successful airway: classic  Size 5    Number of attempts at approach: 1  Assessment: lips, teeth, and gum same as pre-op and atraumatic intubation    Additional Comments  LMA inserted without difficulty.  Lips and teeth intact as preop condition.  No signs or symptoms of gastric regurgitation.  Seal with minimal pressure and secure.

## 2020-12-23 NOTE — OP NOTE
Surgeon: Billy Julio Jr., M.D.    Assistant: None    Pre-Operative Diagnosis:     Bleeding hemorrhoid [K64.9]    Post-Operative Diagnosis:    Post-Op Diagnosis Codes:     * Bleeding hemorrhoid [K64.9]    Procedure Performed:     Hemorrhoidectomy    Indications:     The patient is a pleasant 70-year-old male who has a lower GI bleed that is significant enough to cause anemia.  He is on Eliquis.  He underwent a colonoscopy that was normal and the bleeding was noted to be related to hemorrhoids.  He presents for hemorrhoidectomy.  The patient understands the indications, alternatives, risks, and benefits of the procedure and wishes to proceed.    Procedure:     The patient was identified and taken to the operating room where he was placed in the supine position on the operative table.  He underwent general endotracheal anesthesia and was appropriate throughout the case by the anesthesia personnel.  He was placed in a lithotomy position using candycane's.  His perianal region was prepped and draped in standard surgical fashion.  There were 3 significant columns of hemorrhoid disease, in the right posterior and left lateral where the most prominent.  There was bleeding noted at both the right posterior and left lateral columns.  Each was removed in the same fashion.  An elliptical incision was made starting with the skin of the anus and carried toward the rectum staying very close to the hemorrhoid itself.  The hemorrhoid column was then peeled off of the anoderm and the sphincter complex and completely elevated.  This was performed with Bovie electrocautery as well as the harmonic scalpel.  The base of the hemorrhoid was then divided with the harmonic scalpel.  The incision was then closed with a 3-0 Vicryl suture.  At the end of the case hemostasis was noted be adequate.  Gelfoam was placed in the rectum itself.  Fluffs were then placed over the anus.  The sponge, needle, and instrument counts were correct at the  end of the case.  Patient tolerated surgery well and was transferred to the recovery in stable addition.    Estimated Blood Loss:      minimal    Specimens:     Order Name Source Comment Collection Info Order Time   PREPARE RBC Other   12/23/2020 10:32 AM     Indication for Transfusion   Symptomatic Anemia          When to Transfuse?   Today        TYPE AND SCREEN   Collected By: Saadia Resendez RN 12/23/2020 10:33 AM   TISSUE PATHOLOGY EXAM Hemorrhoid(s)  Collected By: Billy Julio Jr., MD 12/23/2020  1:06 PM       Billy Julio Jr., M.D.

## 2020-12-23 NOTE — PERIOPERATIVE NURSING NOTE
Dr. Julio aware pt's last dose of Eliquis was Sunday 1600. MD also notified 12-21-20 Hgb 7.1. MD did not want Hgb repeated preop. Ordered to type and cross for 1 unit PRBC and when blood ready to transfuse 1 unit and post transfusion give 20mg IV Lasix.

## 2020-12-23 NOTE — PERIOPERATIVE NURSING NOTE
Dr. TIKI Hogan aware of order received from Dr. OSIEL Julio to transfuse one unit PRBC as soon as ready with post transfusion lasix. MD aware pt's last dose of lopressor 1600 yesterday and current VS. MD did not order to give beta blocker preop.

## 2020-12-24 LAB
BH BB BLOOD EXPIRATION DATE: NORMAL
BH BB BLOOD TYPE BARCODE: 5100
BH BB DISPENSE STATUS: NORMAL
BH BB PRODUCT CODE: NORMAL
BH BB UNIT NUMBER: NORMAL
CROSSMATCH INTERPRETATION: NORMAL
CYTO UR: NORMAL
LAB AP CASE REPORT: NORMAL
PATH REPORT.FINAL DX SPEC: NORMAL
PATH REPORT.GROSS SPEC: NORMAL
UNIT  ABO: NORMAL
UNIT  RH: NORMAL

## 2020-12-28 ENCOUNTER — READMISSION MANAGEMENT (OUTPATIENT)
Dept: CALL CENTER | Facility: HOSPITAL | Age: 70
End: 2020-12-28

## 2020-12-28 NOTE — OUTREACH NOTE
Medical Week 2 Survey      Responses   [facility] patient discharged from?  [place]   Does the patient have one of the following disease processes/diagnoses(primary or secondary)?  Other   Week 2 attempt successful?  No   Unsuccessful attempts  Attempt 1          Kareen Long RN

## 2020-12-29 ENCOUNTER — LAB (OUTPATIENT)
Dept: OTHER | Facility: HOSPITAL | Age: 70
End: 2020-12-29

## 2020-12-29 ENCOUNTER — READMISSION MANAGEMENT (OUTPATIENT)
Dept: CALL CENTER | Facility: HOSPITAL | Age: 70
End: 2020-12-29

## 2020-12-29 DIAGNOSIS — C15.9 ADENOCARCINOMA OF ESOPHAGUS (HCC): ICD-10-CM

## 2020-12-29 LAB
BASOPHILS # BLD AUTO: 0.02 10*3/MM3 (ref 0–0.2)
BASOPHILS NFR BLD AUTO: 0.4 % (ref 0–1.5)
DEPRECATED RDW RBC AUTO: 58.5 FL (ref 37–54)
EOSINOPHIL # BLD AUTO: 0.19 10*3/MM3 (ref 0–0.4)
EOSINOPHIL NFR BLD AUTO: 3.4 % (ref 0.3–6.2)
ERYTHROCYTE [DISTWIDTH] IN BLOOD BY AUTOMATED COUNT: 16.6 % (ref 12.3–15.4)
FERRITIN SERPL-MCNC: 175.7 NG/ML (ref 30–400)
HCT VFR BLD AUTO: 27.7 % (ref 37.5–51)
HGB BLD-MCNC: 8.4 G/DL (ref 13–17.7)
HGB RETIC QN AUTO: 25.7 PG (ref 29.8–36.1)
IMM GRANULOCYTES # BLD AUTO: 0.01 10*3/MM3 (ref 0–0.05)
IMM GRANULOCYTES NFR BLD AUTO: 0.2 % (ref 0–0.5)
IMM RETICS NFR: 20.2 % (ref 3–15.8)
IRON 24H UR-MRATE: 43 MCG/DL (ref 59–158)
IRON SATN MFR SERPL: 13 % (ref 20–50)
LYMPHOCYTES # BLD AUTO: 1.15 10*3/MM3 (ref 0.7–3.1)
LYMPHOCYTES NFR BLD AUTO: 20.8 % (ref 19.6–45.3)
MCH RBC QN AUTO: 29.2 PG (ref 26.6–33)
MCHC RBC AUTO-ENTMCNC: 30.3 G/DL (ref 31.5–35.7)
MCV RBC AUTO: 96.2 FL (ref 79–97)
MONOCYTES # BLD AUTO: 0.32 10*3/MM3 (ref 0.1–0.9)
MONOCYTES NFR BLD AUTO: 5.8 % (ref 5–12)
NEUTROPHILS NFR BLD AUTO: 3.85 10*3/MM3 (ref 1.7–7)
NEUTROPHILS NFR BLD AUTO: 69.4 % (ref 42.7–76)
NRBC BLD AUTO-RTO: 0 /100 WBC (ref 0–0.2)
PLATELET # BLD AUTO: 243 10*3/MM3 (ref 140–450)
PMV BLD AUTO: 9.7 FL (ref 6–12)
RBC # BLD AUTO: 2.88 10*6/MM3 (ref 4.14–5.8)
RETICS # AUTO: 0.09 10*6/MM3 (ref 0.02–0.13)
RETICS/RBC NFR AUTO: 3.23 % (ref 0.7–1.9)
TIBC SERPL-MCNC: 343 MCG/DL (ref 298–536)
TRANSFERRIN SERPL-MCNC: 230 MG/DL (ref 200–360)
WBC # BLD AUTO: 5.54 10*3/MM3 (ref 3.4–10.8)

## 2020-12-29 PROCEDURE — 83540 ASSAY OF IRON: CPT | Performed by: INTERNAL MEDICINE

## 2020-12-29 PROCEDURE — 36415 COLL VENOUS BLD VENIPUNCTURE: CPT

## 2020-12-29 PROCEDURE — 85046 RETICYTE/HGB CONCENTRATE: CPT | Performed by: INTERNAL MEDICINE

## 2020-12-29 PROCEDURE — 85025 COMPLETE CBC W/AUTO DIFF WBC: CPT | Performed by: INTERNAL MEDICINE

## 2020-12-29 PROCEDURE — 84466 ASSAY OF TRANSFERRIN: CPT | Performed by: INTERNAL MEDICINE

## 2020-12-29 PROCEDURE — 82728 ASSAY OF FERRITIN: CPT | Performed by: INTERNAL MEDICINE

## 2020-12-29 NOTE — OUTREACH NOTE
Medical Week 2 Survey      Responses   [facility] patient discharged from?  [place]   Does the patient have one of the following disease processes/diagnoses(primary or secondary)?  Other   Week 2 attempt successful?  Yes   Call start time  1503   Discharge diagnosis  Acute blood loss anemia,  colonoscopy   Rescheduled  -- [States he is doing fine. ]   Revoke  Decline to participate   Call end time  1509          Saray Tim RN

## 2020-12-30 ENCOUNTER — TELEPHONE (OUTPATIENT)
Dept: SURGERY | Facility: CLINIC | Age: 70
End: 2020-12-30

## 2020-12-30 NOTE — TELEPHONE ENCOUNTER
Attempted to return the patients call regarding diarrhea after his Hemorrhoidectomy on 12/23/2020 by Dr. Billy Julio Jr.

## 2020-12-30 NOTE — TELEPHONE ENCOUNTER
Patient returned my call and stated that he is having loose bowel movements but not diarrhea. I informed him that a loose bowel movement is actually good due to his hemorrhoidectomy and history of constipation. Patient confirmed that he is taking docusate sodium but not any Percocet as he really has not had any pain. I advised patient to stop the docusate sodium and to add a fiber supplement to bulk up his stool but if he began to experience any type of constipation to stop the fiber and begin the docusate sodium again. Patient verbalized understanding and will call back if he has any additional questions or concerns.

## 2020-12-31 NOTE — TELEPHONE ENCOUNTER
Patient called the office again this am reporting continued loose BMs that seems to be turning into diarrhea. He has not started any fiber supplements or high fiber foods yet. Recommended that he start that to bulk the stool. Should his output turn into diarrhea resulting in multiple watery BMs during the day he can take Imodium to control the diarrhea. He was advised not to start this unless he is actually having diarrhea vs just loose stools. Patient verbalized understanding.

## 2021-01-05 ENCOUNTER — OFFICE VISIT (OUTPATIENT)
Dept: ONCOLOGY | Facility: CLINIC | Age: 71
End: 2021-01-05

## 2021-01-05 ENCOUNTER — APPOINTMENT (OUTPATIENT)
Dept: OTHER | Facility: HOSPITAL | Age: 71
End: 2021-01-05

## 2021-01-05 VITALS
HEART RATE: 75 BPM | DIASTOLIC BLOOD PRESSURE: 78 MMHG | TEMPERATURE: 97.8 F | SYSTOLIC BLOOD PRESSURE: 154 MMHG | BODY MASS INDEX: 28.08 KG/M2 | HEIGHT: 77 IN | OXYGEN SATURATION: 100 % | RESPIRATION RATE: 18 BRPM | WEIGHT: 237.8 LBS

## 2021-01-05 DIAGNOSIS — C15.9 ADENOCARCINOMA OF ESOPHAGUS (HCC): Primary | ICD-10-CM

## 2021-01-05 PROBLEM — D50.9 IRON DEFICIENCY ANEMIA: Status: ACTIVE | Noted: 2021-01-05

## 2021-01-05 PROBLEM — K90.9 MALABSORPTION OF IRON: Status: ACTIVE | Noted: 2021-01-05

## 2021-01-05 PROCEDURE — 99215 OFFICE O/P EST HI 40 MIN: CPT | Performed by: INTERNAL MEDICINE

## 2021-01-06 ENCOUNTER — INFUSION (OUTPATIENT)
Dept: ONCOLOGY | Facility: HOSPITAL | Age: 71
End: 2021-01-06

## 2021-01-06 VITALS
SYSTOLIC BLOOD PRESSURE: 111 MMHG | WEIGHT: 240.4 LBS | HEART RATE: 73 BPM | BODY MASS INDEX: 28.38 KG/M2 | OXYGEN SATURATION: 99 % | DIASTOLIC BLOOD PRESSURE: 64 MMHG | RESPIRATION RATE: 18 BRPM | TEMPERATURE: 97.8 F | HEIGHT: 77 IN

## 2021-01-06 DIAGNOSIS — K90.9 MALABSORPTION OF IRON: Primary | ICD-10-CM

## 2021-01-06 DIAGNOSIS — D50.9 IRON DEFICIENCY ANEMIA, UNSPECIFIED IRON DEFICIENCY ANEMIA TYPE: ICD-10-CM

## 2021-01-06 PROCEDURE — 96374 THER/PROPH/DIAG INJ IV PUSH: CPT

## 2021-01-06 PROCEDURE — 63710000001 PROCHLORPERAZINE MALEATE PER 5 MG: Performed by: NURSE PRACTITIONER

## 2021-01-06 PROCEDURE — 25010000002 FERRIC CARBOXYMALTOSE 750 MG/15ML SOLUTION 15 ML VIAL: Performed by: NURSE PRACTITIONER

## 2021-01-06 RX ORDER — SODIUM CHLORIDE 9 MG/ML
250 INJECTION, SOLUTION INTRAVENOUS ONCE
Status: COMPLETED | OUTPATIENT
Start: 2021-01-06 | End: 2021-01-06

## 2021-01-06 RX ORDER — PROCHLORPERAZINE MALEATE 5 MG/1
10 TABLET ORAL ONCE
Status: COMPLETED | OUTPATIENT
Start: 2021-01-06 | End: 2021-01-06

## 2021-01-06 RX ADMIN — SODIUM CHLORIDE 250 ML: 9 INJECTION, SOLUTION INTRAVENOUS at 13:21

## 2021-01-06 RX ADMIN — FERRIC CARBOXYMALTOSE INJECTION 750 MG: 50 INJECTION, SOLUTION INTRAVENOUS at 13:39

## 2021-01-06 RX ADMIN — PROCHLORPERAZINE MALEATE 10 MG: 5 TABLET ORAL at 13:21

## 2021-01-07 ENCOUNTER — OFFICE VISIT (OUTPATIENT)
Dept: SURGERY | Facility: CLINIC | Age: 71
End: 2021-01-07

## 2021-01-07 DIAGNOSIS — Z48.89 POSTOPERATIVE VISIT: Primary | ICD-10-CM

## 2021-01-07 PROCEDURE — 99024 POSTOP FOLLOW-UP VISIT: CPT | Performed by: SURGERY

## 2021-01-07 NOTE — PROGRESS NOTES
Subjective   Jose Hurtado is a 70 y.o. male who returns to the office after undergoing a hemorrhoidectomy on 12/23/2020.     History of Present Illness     The patient is recovering well with no significant postop symptoms.  He has minimal pain.  He is having regular bowel function.    Review of Systems   Constitutional: Negative for fatigue and fever.   Respiratory: Negative for chest tightness and shortness of breath.    Cardiovascular: Negative for chest pain and palpitations.   Gastrointestinal: Negative for abdominal pain, blood in stool, constipation, diarrhea, nausea and vomiting.       Objective   Physical Exam  Constitutional:       Appearance: He is not ill-appearing or toxic-appearing.   Genitourinary:     Comments: Hemorrhoidectomy site is healing well with no complicating features.  Neurological:      Mental Status: He is alert.   Psychiatric:         Behavior: Behavior is cooperative.         Assessment/Plan   The encounter diagnosis was Postoperative visit.    The patient is recovering well from his hemorrhoidectomy.  At this point he has no limitations.  He will follow-up on an as-needed basis.

## 2021-01-12 ENCOUNTER — LAB (OUTPATIENT)
Dept: OTHER | Facility: HOSPITAL | Age: 71
End: 2021-01-12

## 2021-01-12 ENCOUNTER — CLINICAL SUPPORT (OUTPATIENT)
Dept: ONCOLOGY | Facility: HOSPITAL | Age: 71
End: 2021-01-12

## 2021-01-12 VITALS
WEIGHT: 238.4 LBS | OXYGEN SATURATION: 98 % | HEIGHT: 77 IN | BODY MASS INDEX: 28.15 KG/M2 | HEART RATE: 69 BPM | DIASTOLIC BLOOD PRESSURE: 72 MMHG | SYSTOLIC BLOOD PRESSURE: 135 MMHG | TEMPERATURE: 97.7 F | RESPIRATION RATE: 18 BRPM

## 2021-01-12 DIAGNOSIS — C15.9 ADENOCARCINOMA OF ESOPHAGUS (HCC): ICD-10-CM

## 2021-01-12 LAB
BASOPHILS # BLD AUTO: 0.02 10*3/MM3 (ref 0–0.2)
BASOPHILS NFR BLD AUTO: 0.5 % (ref 0–1.5)
DEPRECATED RDW RBC AUTO: 56.7 FL (ref 37–54)
EOSINOPHIL # BLD AUTO: 0.27 10*3/MM3 (ref 0–0.4)
EOSINOPHIL NFR BLD AUTO: 7.1 % (ref 0.3–6.2)
ERYTHROCYTE [DISTWIDTH] IN BLOOD BY AUTOMATED COUNT: 16.9 % (ref 12.3–15.4)
HCT VFR BLD AUTO: 28.5 % (ref 37.5–51)
HGB BLD-MCNC: 8.8 G/DL (ref 13–17.7)
IMM GRANULOCYTES # BLD AUTO: 0.02 10*3/MM3 (ref 0–0.05)
IMM GRANULOCYTES NFR BLD AUTO: 0.5 % (ref 0–0.5)
LYMPHOCYTES # BLD AUTO: 1.13 10*3/MM3 (ref 0.7–3.1)
LYMPHOCYTES NFR BLD AUTO: 29.7 % (ref 19.6–45.3)
MCH RBC QN AUTO: 29 PG (ref 26.6–33)
MCHC RBC AUTO-ENTMCNC: 30.9 G/DL (ref 31.5–35.7)
MCV RBC AUTO: 94.1 FL (ref 79–97)
MONOCYTES # BLD AUTO: 0.67 10*3/MM3 (ref 0.1–0.9)
MONOCYTES NFR BLD AUTO: 17.6 % (ref 5–12)
NEUTROPHILS NFR BLD AUTO: 1.69 10*3/MM3 (ref 1.7–7)
NEUTROPHILS NFR BLD AUTO: 44.6 % (ref 42.7–76)
NRBC BLD AUTO-RTO: 0 /100 WBC (ref 0–0.2)
PLATELET # BLD AUTO: 169 10*3/MM3 (ref 140–450)
PMV BLD AUTO: 9.1 FL (ref 6–12)
RBC # BLD AUTO: 3.03 10*6/MM3 (ref 4.14–5.8)
WBC # BLD AUTO: 3.8 10*3/MM3 (ref 3.4–10.8)

## 2021-01-12 PROCEDURE — G0463 HOSPITAL OUTPT CLINIC VISIT: HCPCS

## 2021-01-12 PROCEDURE — 85025 COMPLETE CBC W/AUTO DIFF WBC: CPT | Performed by: INTERNAL MEDICINE

## 2021-01-12 PROCEDURE — 36415 COLL VENOUS BLD VENIPUNCTURE: CPT

## 2021-01-12 NOTE — NURSING NOTE
Pt is here for lab with RN review.  CBC reviewed with pt, counts are stable for this pt at this time. Pt has no complaints.  Copy of labs given to pt and f/u appt reviewed. Pt is instructed to call the office with any concerns or new symptoms prior to next visit. Pt vu  Lab Results   Component Value Date    WBC 3.80 01/12/2021    HGB 8.8 (L) 01/12/2021    HCT 28.5 (L) 01/12/2021    MCV 94.1 01/12/2021     01/12/2021

## 2021-01-19 ENCOUNTER — LAB (OUTPATIENT)
Dept: OTHER | Facility: HOSPITAL | Age: 71
End: 2021-01-19

## 2021-01-19 ENCOUNTER — CLINICAL SUPPORT (OUTPATIENT)
Dept: ONCOLOGY | Facility: HOSPITAL | Age: 71
End: 2021-01-19

## 2021-01-19 DIAGNOSIS — C15.9 ADENOCARCINOMA OF ESOPHAGUS (HCC): ICD-10-CM

## 2021-01-19 LAB
BASOPHILS # BLD AUTO: 0.03 10*3/MM3 (ref 0–0.2)
BASOPHILS NFR BLD AUTO: 0.9 % (ref 0–1.5)
DEPRECATED RDW RBC AUTO: 59.8 FL (ref 37–54)
EOSINOPHIL # BLD AUTO: 0.25 10*3/MM3 (ref 0–0.4)
EOSINOPHIL NFR BLD AUTO: 7.2 % (ref 0.3–6.2)
ERYTHROCYTE [DISTWIDTH] IN BLOOD BY AUTOMATED COUNT: 17.4 % (ref 12.3–15.4)
HCT VFR BLD AUTO: 30.7 % (ref 37.5–51)
HGB BLD-MCNC: 9.6 G/DL (ref 13–17.7)
IMM GRANULOCYTES # BLD AUTO: 0.03 10*3/MM3 (ref 0–0.05)
IMM GRANULOCYTES NFR BLD AUTO: 0.9 % (ref 0–0.5)
LYMPHOCYTES # BLD AUTO: 1.15 10*3/MM3 (ref 0.7–3.1)
LYMPHOCYTES NFR BLD AUTO: 33 % (ref 19.6–45.3)
MCH RBC QN AUTO: 29.4 PG (ref 26.6–33)
MCHC RBC AUTO-ENTMCNC: 31.3 G/DL (ref 31.5–35.7)
MCV RBC AUTO: 93.9 FL (ref 79–97)
MONOCYTES # BLD AUTO: 0.42 10*3/MM3 (ref 0.1–0.9)
MONOCYTES NFR BLD AUTO: 12.1 % (ref 5–12)
NEUTROPHILS NFR BLD AUTO: 1.6 10*3/MM3 (ref 1.7–7)
NEUTROPHILS NFR BLD AUTO: 45.9 % (ref 42.7–76)
NRBC BLD AUTO-RTO: 0 /100 WBC (ref 0–0.2)
PLATELET # BLD AUTO: 167 10*3/MM3 (ref 140–450)
PMV BLD AUTO: 9.5 FL (ref 6–12)
RBC # BLD AUTO: 3.27 10*6/MM3 (ref 4.14–5.8)
WBC # BLD AUTO: 3.48 10*3/MM3 (ref 3.4–10.8)

## 2021-01-19 PROCEDURE — 36415 COLL VENOUS BLD VENIPUNCTURE: CPT

## 2021-01-19 PROCEDURE — 85025 COMPLETE CBC W/AUTO DIFF WBC: CPT | Performed by: INTERNAL MEDICINE

## 2021-01-19 PROCEDURE — G0463 HOSPITAL OUTPT CLINIC VISIT: HCPCS

## 2021-01-19 NOTE — NURSING NOTE
Lab Results   Component Value Date    WBC 3.48 01/19/2021    HGB 9.6 (L) 01/19/2021    HCT 30.7 (L) 01/19/2021    MCV 93.9 01/19/2021     01/19/2021     Pt is here for lab with RN review.  CBC reviewed with pt, counts are stable for this pt at this time. Pt has no complaints.  Copy of labs given to pt and f/u appt reviewed. Pt is instructed to call the office with any concerns or new symptoms prior to next visit. Pt vu

## 2021-01-26 ENCOUNTER — CLINICAL SUPPORT (OUTPATIENT)
Dept: INTERNAL MEDICINE | Age: 71
End: 2021-01-26

## 2021-01-26 ENCOUNTER — LAB (OUTPATIENT)
Dept: OTHER | Facility: HOSPITAL | Age: 71
End: 2021-01-26

## 2021-01-26 ENCOUNTER — CLINICAL SUPPORT (OUTPATIENT)
Dept: ONCOLOGY | Facility: HOSPITAL | Age: 71
End: 2021-01-26

## 2021-01-26 VITALS
SYSTOLIC BLOOD PRESSURE: 139 MMHG | HEART RATE: 71 BPM | RESPIRATION RATE: 18 BRPM | HEIGHT: 77 IN | WEIGHT: 225 LBS | OXYGEN SATURATION: 99 % | BODY MASS INDEX: 26.57 KG/M2 | DIASTOLIC BLOOD PRESSURE: 73 MMHG | TEMPERATURE: 97.7 F

## 2021-01-26 DIAGNOSIS — C15.9 ADENOCARCINOMA OF ESOPHAGUS (HCC): ICD-10-CM

## 2021-01-26 LAB
BASOPHILS # BLD AUTO: 0.04 10*3/MM3 (ref 0–0.2)
BASOPHILS NFR BLD AUTO: 1.1 % (ref 0–1.5)
DEPRECATED RDW RBC AUTO: 58.5 FL (ref 37–54)
EOSINOPHIL # BLD AUTO: 0.24 10*3/MM3 (ref 0–0.4)
EOSINOPHIL NFR BLD AUTO: 6.7 % (ref 0.3–6.2)
ERYTHROCYTE [DISTWIDTH] IN BLOOD BY AUTOMATED COUNT: 16.9 % (ref 12.3–15.4)
HCT VFR BLD AUTO: 31.4 % (ref 37.5–51)
HGB BLD-MCNC: 9.8 G/DL (ref 13–17.7)
IMM GRANULOCYTES # BLD AUTO: 0.03 10*3/MM3 (ref 0–0.05)
IMM GRANULOCYTES NFR BLD AUTO: 0.8 % (ref 0–0.5)
LYMPHOCYTES # BLD AUTO: 1.24 10*3/MM3 (ref 0.7–3.1)
LYMPHOCYTES NFR BLD AUTO: 34.8 % (ref 19.6–45.3)
MCH RBC QN AUTO: 29.4 PG (ref 26.6–33)
MCHC RBC AUTO-ENTMCNC: 31.2 G/DL (ref 31.5–35.7)
MCV RBC AUTO: 94.3 FL (ref 79–97)
MONOCYTES # BLD AUTO: 0.53 10*3/MM3 (ref 0.1–0.9)
MONOCYTES NFR BLD AUTO: 14.9 % (ref 5–12)
NEUTROPHILS NFR BLD AUTO: 1.48 10*3/MM3 (ref 1.7–7)
NEUTROPHILS NFR BLD AUTO: 41.7 % (ref 42.7–76)
NRBC BLD AUTO-RTO: 0 /100 WBC (ref 0–0.2)
PLATELET # BLD AUTO: 150 10*3/MM3 (ref 140–450)
PMV BLD AUTO: 9.3 FL (ref 6–12)
RBC # BLD AUTO: 3.33 10*6/MM3 (ref 4.14–5.8)
WBC # BLD AUTO: 3.56 10*3/MM3 (ref 3.4–10.8)

## 2021-01-26 PROCEDURE — 36415 COLL VENOUS BLD VENIPUNCTURE: CPT

## 2021-01-26 PROCEDURE — G0463 HOSPITAL OUTPT CLINIC VISIT: HCPCS

## 2021-01-26 PROCEDURE — 96372 THER/PROPH/DIAG INJ SC/IM: CPT | Performed by: INTERNAL MEDICINE

## 2021-01-26 PROCEDURE — 85025 COMPLETE CBC W/AUTO DIFF WBC: CPT | Performed by: INTERNAL MEDICINE

## 2021-01-26 RX ADMIN — CYANOCOBALAMIN 1000 MCG: 1000 INJECTION, SOLUTION INTRAMUSCULAR; SUBCUTANEOUS at 09:07

## 2021-01-26 NOTE — NURSING NOTE
Patient arrived ambulatory for lab and RN review. Patient has no complaints of active bleeding or bruising. Patient's labs reviewed with patient and copy given as well as the scheduled next visit. Patient taught about continued neutropenic precautions until his ANC rises further. Patient v/u that if any concerns arise before next visit he will contact the physician.   Lab Results   Component Value Date    WBC 3.56 01/26/2021    HGB 9.8 (L) 01/26/2021    HCT 31.4 (L) 01/26/2021    MCV 94.3 01/26/2021     01/26/2021     Lab Results   Component Value Date    NEUTROABS 1.48 (L) 01/26/2021

## 2021-02-02 ENCOUNTER — CLINICAL SUPPORT (OUTPATIENT)
Dept: ONCOLOGY | Facility: HOSPITAL | Age: 71
End: 2021-02-02

## 2021-02-02 ENCOUNTER — LAB (OUTPATIENT)
Dept: OTHER | Facility: HOSPITAL | Age: 71
End: 2021-02-02

## 2021-02-02 VITALS
TEMPERATURE: 97.7 F | SYSTOLIC BLOOD PRESSURE: 144 MMHG | WEIGHT: 222 LBS | HEART RATE: 68 BPM | OXYGEN SATURATION: 98 % | HEIGHT: 77 IN | BODY MASS INDEX: 26.21 KG/M2 | RESPIRATION RATE: 18 BRPM | DIASTOLIC BLOOD PRESSURE: 78 MMHG

## 2021-02-02 DIAGNOSIS — C15.9 ADENOCARCINOMA OF ESOPHAGUS (HCC): ICD-10-CM

## 2021-02-02 LAB
BASOPHILS # BLD AUTO: 0.04 10*3/MM3 (ref 0–0.2)
BASOPHILS NFR BLD AUTO: 1.1 % (ref 0–1.5)
DEPRECATED RDW RBC AUTO: 55.5 FL (ref 37–54)
EOSINOPHIL # BLD AUTO: 0.39 10*3/MM3 (ref 0–0.4)
EOSINOPHIL NFR BLD AUTO: 10.9 % (ref 0.3–6.2)
ERYTHROCYTE [DISTWIDTH] IN BLOOD BY AUTOMATED COUNT: 15.9 % (ref 12.3–15.4)
FERRITIN SERPL-MCNC: 316.9 NG/ML (ref 30–400)
HCT VFR BLD AUTO: 33.4 % (ref 37.5–51)
HGB BLD-MCNC: 10.3 G/DL (ref 13–17.7)
HGB RETIC QN AUTO: 29.5 PG (ref 29.8–36.1)
IMM GRANULOCYTES # BLD AUTO: 0 10*3/MM3 (ref 0–0.05)
IMM GRANULOCYTES NFR BLD AUTO: 0 % (ref 0–0.5)
IMM RETICS NFR: 13.5 % (ref 3–15.8)
IRON 24H UR-MRATE: 55 MCG/DL (ref 59–158)
IRON SATN MFR SERPL: 17 % (ref 20–50)
LYMPHOCYTES # BLD AUTO: 1.28 10*3/MM3 (ref 0.7–3.1)
LYMPHOCYTES NFR BLD AUTO: 35.8 % (ref 19.6–45.3)
MCH RBC QN AUTO: 29.1 PG (ref 26.6–33)
MCHC RBC AUTO-ENTMCNC: 30.8 G/DL (ref 31.5–35.7)
MCV RBC AUTO: 94.4 FL (ref 79–97)
MONOCYTES # BLD AUTO: 0.39 10*3/MM3 (ref 0.1–0.9)
MONOCYTES NFR BLD AUTO: 10.9 % (ref 5–12)
NEUTROPHILS NFR BLD AUTO: 1.48 10*3/MM3 (ref 1.7–7)
NEUTROPHILS NFR BLD AUTO: 41.3 % (ref 42.7–76)
NRBC BLD AUTO-RTO: 0 /100 WBC (ref 0–0.2)
PLATELET # BLD AUTO: 159 10*3/MM3 (ref 140–450)
PMV BLD AUTO: 8.6 FL (ref 6–12)
RBC # BLD AUTO: 3.54 10*6/MM3 (ref 4.14–5.8)
RETICS # AUTO: 0.03 10*6/MM3 (ref 0.02–0.13)
RETICS/RBC NFR AUTO: 0.94 % (ref 0.7–1.9)
TIBC SERPL-MCNC: 332 MCG/DL (ref 298–536)
TRANSFERRIN SERPL-MCNC: 223 MG/DL (ref 200–360)
VIT B12 BLD-MCNC: 789 PG/ML (ref 211–946)
WBC # BLD AUTO: 3.58 10*3/MM3 (ref 3.4–10.8)

## 2021-02-02 PROCEDURE — 85025 COMPLETE CBC W/AUTO DIFF WBC: CPT | Performed by: INTERNAL MEDICINE

## 2021-02-02 PROCEDURE — G0463 HOSPITAL OUTPT CLINIC VISIT: HCPCS

## 2021-02-02 PROCEDURE — 36415 COLL VENOUS BLD VENIPUNCTURE: CPT

## 2021-02-02 PROCEDURE — 84466 ASSAY OF TRANSFERRIN: CPT | Performed by: INTERNAL MEDICINE

## 2021-02-02 PROCEDURE — 82728 ASSAY OF FERRITIN: CPT | Performed by: INTERNAL MEDICINE

## 2021-02-02 PROCEDURE — 82747 ASSAY OF FOLIC ACID RBC: CPT | Performed by: INTERNAL MEDICINE

## 2021-02-02 PROCEDURE — 85046 RETICYTE/HGB CONCENTRATE: CPT | Performed by: INTERNAL MEDICINE

## 2021-02-02 PROCEDURE — 85014 HEMATOCRIT: CPT | Performed by: INTERNAL MEDICINE

## 2021-02-02 PROCEDURE — 83540 ASSAY OF IRON: CPT | Performed by: INTERNAL MEDICINE

## 2021-02-02 PROCEDURE — 82607 VITAMIN B-12: CPT | Performed by: INTERNAL MEDICINE

## 2021-02-02 NOTE — NURSING NOTE
Lab Results   Component Value Date    WBC 3.58 02/02/2021    HGB 10.3 (L) 02/02/2021    HCT 33.4 (L) 02/02/2021    MCV 94.4 02/02/2021     02/02/2021     Pt is here for lab with RN review.  CBC reviewed with pt, counts are stable for this pt at this time. Pt has no complaints.  Copy of labs given to pt and f/u appt reviewed. Pt is instructed to call the office with any concerns or new symptoms prior to next visit. Pt vu

## 2021-02-03 LAB
FOLATE BLD-MCNC: 441 NG/ML
FOLATE RBC-MCNC: 1418 NG/ML
HCT VFR BLD AUTO: 31.1 % (ref 37.5–51)

## 2021-02-09 ENCOUNTER — LAB (OUTPATIENT)
Dept: OTHER | Facility: HOSPITAL | Age: 71
End: 2021-02-09

## 2021-02-09 ENCOUNTER — OFFICE VISIT (OUTPATIENT)
Dept: ONCOLOGY | Facility: CLINIC | Age: 71
End: 2021-02-09

## 2021-02-09 VITALS
OXYGEN SATURATION: 97 % | SYSTOLIC BLOOD PRESSURE: 110 MMHG | RESPIRATION RATE: 18 BRPM | DIASTOLIC BLOOD PRESSURE: 67 MMHG | HEART RATE: 70 BPM | TEMPERATURE: 97.3 F | WEIGHT: 226.7 LBS | HEIGHT: 77 IN | BODY MASS INDEX: 26.77 KG/M2

## 2021-02-09 DIAGNOSIS — C15.9 ADENOCARCINOMA OF ESOPHAGUS (HCC): ICD-10-CM

## 2021-02-09 DIAGNOSIS — D50.9 IRON DEFICIENCY ANEMIA, UNSPECIFIED IRON DEFICIENCY ANEMIA TYPE: Primary | ICD-10-CM

## 2021-02-09 LAB
BASOPHILS # BLD AUTO: 0.03 10*3/MM3 (ref 0–0.2)
BASOPHILS NFR BLD AUTO: 0.7 % (ref 0–1.5)
DEPRECATED RDW RBC AUTO: 53 FL (ref 37–54)
EOSINOPHIL # BLD AUTO: 0.34 10*3/MM3 (ref 0–0.4)
EOSINOPHIL NFR BLD AUTO: 8.2 % (ref 0.3–6.2)
ERYTHROCYTE [DISTWIDTH] IN BLOOD BY AUTOMATED COUNT: 15.7 % (ref 12.3–15.4)
HCT VFR BLD AUTO: 32.5 % (ref 37.5–51)
HGB BLD-MCNC: 10.4 G/DL (ref 13–17.7)
IMM GRANULOCYTES # BLD AUTO: 0.03 10*3/MM3 (ref 0–0.05)
IMM GRANULOCYTES NFR BLD AUTO: 0.7 % (ref 0–0.5)
LYMPHOCYTES # BLD AUTO: 1.35 10*3/MM3 (ref 0.7–3.1)
LYMPHOCYTES NFR BLD AUTO: 32.6 % (ref 19.6–45.3)
MCH RBC QN AUTO: 29.5 PG (ref 26.6–33)
MCHC RBC AUTO-ENTMCNC: 32 G/DL (ref 31.5–35.7)
MCV RBC AUTO: 92.1 FL (ref 79–97)
MONOCYTES # BLD AUTO: 0.44 10*3/MM3 (ref 0.1–0.9)
MONOCYTES NFR BLD AUTO: 10.6 % (ref 5–12)
NEUTROPHILS NFR BLD AUTO: 1.95 10*3/MM3 (ref 1.7–7)
NEUTROPHILS NFR BLD AUTO: 47.2 % (ref 42.7–76)
NRBC BLD AUTO-RTO: 0 /100 WBC (ref 0–0.2)
PLATELET # BLD AUTO: 168 10*3/MM3 (ref 140–450)
PMV BLD AUTO: 9.2 FL (ref 6–12)
RBC # BLD AUTO: 3.53 10*6/MM3 (ref 4.14–5.8)
WBC # BLD AUTO: 4.14 10*3/MM3 (ref 3.4–10.8)

## 2021-02-09 PROCEDURE — 36415 COLL VENOUS BLD VENIPUNCTURE: CPT

## 2021-02-09 PROCEDURE — 85025 COMPLETE CBC W/AUTO DIFF WBC: CPT | Performed by: INTERNAL MEDICINE

## 2021-02-09 PROCEDURE — 99214 OFFICE O/P EST MOD 30 MIN: CPT | Performed by: INTERNAL MEDICINE

## 2021-02-10 ENCOUNTER — TELEPHONE (OUTPATIENT)
Dept: INTERNAL MEDICINE | Age: 71
End: 2021-02-10

## 2021-02-10 DIAGNOSIS — R26.9 GAIT DIFFICULTY: Primary | ICD-10-CM

## 2021-02-10 DIAGNOSIS — R26.89 IMBALANCE: ICD-10-CM

## 2021-02-22 ENCOUNTER — HOSPITAL ENCOUNTER (OUTPATIENT)
Dept: PHYSICAL THERAPY | Facility: HOSPITAL | Age: 71
Setting detail: THERAPIES SERIES
Discharge: HOME | End: 2021-02-22

## 2021-02-22 DIAGNOSIS — I89.0 LYMPHEDEMA: Primary | ICD-10-CM

## 2021-02-22 PROCEDURE — 97140 MANUAL THERAPY 1/> REGIONS: CPT

## 2021-02-22 NOTE — THERAPY TREATMENT NOTE
Outpatient Physical Therapy Lymphedema Treatment Note  [facility]     Patient Name: Jose Hurtado  : 1950  MRN: [id number]  Today's Date: 2021        Visit Date: 2021    Visit Dx:    ICD-10-CM ICD-9-CM   1. Lymphedema  I89.0 457.1       Patient Active Problem List   Diagnosis   • Adenocarcinoma of esophagus (CMS/HCC)   • Adenomatous polyp of colon   • Malignant neoplasm of prostate (CMS/HCC)   • RLS (restless legs syndrome)   • Recurrent major depressive disorder, in partial remission (CMS/HCC)   • Hypertension   • Anemia   • Insomnia due to other mental disorder   • Peripheral polyneuropathy   • B12 deficiency   • Postsurgical malabsorption   • Lymphedema   • Iron deficiency   • Gastroparesis   • Acute deep vein thrombosis (DVT) of popliteal vein of left lower extremity (CMS/HCC)   • Tremor of both hands   • Gastrointestinal hemorrhage   • Acute blood loss anemia   • Pancytopenia (CMS/HCC)   • Chronic venous hypertension due to DVT   • COPD (chronic obstructive pulmonary disease) (CMS/HCC)   • Bleeding hemorrhoid   • Malabsorption of iron   • Iron deficiency anemia        Lymphedema     Row Name 21 1000             Subjective Pain    Able to rate subjective pain?  yes  -PC      Pre-Treatment Pain Level  0  -PC      Post-Treatment Pain Level  0  -PC         Subjective Comments    Subjective Comments  States he thinks his legs have gotten bigger since he was here last. No new medical problems.  -PC         Lymphedema Edema Assessment    Edema Assessment Comment  Mod+ edema left ankle to knee, Mod right ankle to knee. Feet with min edema.  -PC         Lymphedema Measurements    Measurement Type(s)  Circumferential  -PC      Circumferential Areas  Lower extremities  -PC         BLE Circumferential (cm)    Measurement Location 1  Knee  -PC      Left 1  41.5 cm  -PC      Right 1  39.6 cm  -PC      Measurement Location 2  10cm below knee  -PC      Left 2  42 cm  -PC      Right 2  39.5 cm   -PC      Measurement Location 3  20cm below knee  -PC      Left 3  47.5 cm  -PC      Right 3  39.2 cm  -PC      Measurement Location 4  30cm below knee  -PC      Left 4  38.5 cm  -PC      Right 4  31.1 cm  -PC      Measurement Location 5  Ankle  -PC      Left 5  31.5 cm  -PC      Right 5  27.1 cm  -PC      Measurement Location 6  Midfoot  -PC      Left 6  24.5 cm  -PC      Right 6  24 cm  -PC      LLE Circumferential Total  225.5 cm  -PC      RLE Circumferential Total  200.5 cm  -PC         Manual Lymphatic Drainage    Manual Lymphatic Drainage Comments  B inguinal, BLE's  -PC         Compression/Skin Care    Skin Care  moisturizing lotion applied  -PC      Wrapping Location  lower extremity  -PC      Wrapping Location LE  bilateral:;foot to knee  -PC      Wrapping Comments  Tg9, artiflex, short stretch bandages 1-8cm, 3-10cm on right , 4-10cm on left.  -PC      Compression/Skin Care Comments  Started 2 bandages on feet.  -PC        User Key  (r) = Recorded By, (t) = Taken By, (c) = Cosigned By    Initials Name Provider Type    Gogo Carroll, PT Physical Therapist                        PT Assessment/Plan     Row Name 02/22/21 1033          PT Assessment    Assessment Comments  Pt returns today to start treatment. Msmts have decreased slightly since his initial evaluation. He cont to have mod+ edema left ankle to knee and mod edema right ankle to knee. He has min edema in feet.  -PC        PT Plan    PT Plan Comments  See pt daily for complete decongestive therapy per POC.  -PC       User Key  (r) = Recorded By, (t) = Taken By, (c) = Cosigned By    Initials Name Provider Type    Gogo Carroll, PT Physical Therapist             OP Exercises     Row Name 02/22/21 1036 02/22/21 1000          Subjective Comments    Subjective Comments  --  States he thinks his legs have gotten bigger since he was here last. No new medical problems.  -PC        Subjective Pain    Able to rate subjective pain?  --  yes  -PC      Pre-Treatment Pain Level  --  0  -PC     Post-Treatment Pain Level  --  0  -PC        Total Minutes    51234 - PT Manual Therapy Minutes  59  -PC  --       User Key  (r) = Recorded By, (t) = Taken By, (c) = Cosigned By    Initials Name Provider Type    Gogo Carroll, PT Physical Therapist                     Manual Rx (last 36 hours)      Manual Treatments     Row Name 02/22/21 1036             Total Minutes    14845 - PT Manual Therapy Minutes  59  -PC        User Key  (r) = Recorded By, (t) = Taken By, (c) = Cosigned By    Initials Name Provider Type    Gogo Carroll, PT Physical Therapist          PT OP Goals     Row Name 02/22/21 1000          PT Short Term Goals    STG Date to Achieve  03/08/21  -PC     STG 1  Pt demo awareness of condition and precautions for improved prevention, management, care of symptoms, and ease of transition to self-care of condition.  -PC     STG 1 Progress  New  -PC     STG 2  Pt/family independent with self-wrapping techniques of compression bandages as indicated for improved self-management of condition.  -PC     STG 2 Progress  New  -PC     STG 3  Pt demo decreased net edema of >/=5-10cm for decreased edema symptoms, decreased risk of infection, and improved skin care.  -PC     STG 3 Progress  New  -PC        Long Term Goals    LTG Date to Achieve  03/22/21  -PC     LTG 1  Pt/family independent with self care techniques for self-management of condition.  -PC     LTG 1 Progress  New  -     LTG 2  Pt demo decreased net edema of >/=10-20cm for decreased edema symptoms, decreased risk of infection, and improved skin care.  -PC     LTG 2 Progress  New  -PC     LTG 3  Pt/family independent with compression garments as indicated for self-management of condition.  -PC     LTG 3 Progress  New  -        Time Calculation    PT Goal Re-Cert Due Date  05/21/21  -PC       User Key  (r) = Recorded By, (t) = Taken By, (c) = Cosigned By    Initials Name Provider Type    FRANCISCO Mei  Gogo DIZA, PT Physical Therapist          Therapy Education  Education Details: Bandage precautions.  Given: Bandaging/dressing change  Program: Reinforced  How Provided: Verbal  Provided to: Patient  Level of Understanding: Verbalized              Time Calculation:   Start Time: 0930  Stop Time: 1029  Time Calculation (min): 59 min  Total Timed Code Minutes- PT: 59 minute(s)   Therapy Charges for Today     Code Description Service Date Service Provider Modifiers Qty    83353889630  PT MANUAL THERAPY EA 15 MIN 2/22/2021 Gogo Mei, PT GP 4                    Gogo Mei, PT  2/22/2021

## 2021-02-23 ENCOUNTER — HOSPITAL ENCOUNTER (OUTPATIENT)
Dept: PHYSICAL THERAPY | Facility: HOSPITAL | Age: 71
Setting detail: THERAPIES SERIES
Discharge: HOME | End: 2021-02-23

## 2021-02-23 DIAGNOSIS — I89.0 LYMPHEDEMA: Primary | ICD-10-CM

## 2021-02-23 PROCEDURE — 97140 MANUAL THERAPY 1/> REGIONS: CPT

## 2021-02-23 NOTE — THERAPY TREATMENT NOTE
Outpatient Physical Therapy Lymphedema Treatment Note  [facility]     Patient Name: Jose Hurtado  : 1950  MRN: [id number]  Today's Date: 2021        Visit Date: 2021    Visit Dx:    ICD-10-CM ICD-9-CM   1. Lymphedema  I89.0 457.1       Patient Active Problem List   Diagnosis   • Adenocarcinoma of esophagus (CMS/HCC)   • Adenomatous polyp of colon   • Malignant neoplasm of prostate (CMS/HCC)   • RLS (restless legs syndrome)   • Recurrent major depressive disorder, in partial remission (CMS/HCC)   • Hypertension   • Anemia   • Insomnia due to other mental disorder   • Peripheral polyneuropathy   • B12 deficiency   • Postsurgical malabsorption   • Lymphedema   • Iron deficiency   • Gastroparesis   • Acute deep vein thrombosis (DVT) of popliteal vein of left lower extremity (CMS/HCC)   • Tremor of both hands   • Gastrointestinal hemorrhage   • Acute blood loss anemia   • Pancytopenia (CMS/HCC)   • Chronic venous hypertension due to DVT   • COPD (chronic obstructive pulmonary disease) (CMS/HCC)   • Bleeding hemorrhoid   • Malabsorption of iron   • Iron deficiency anemia        Lymphedema     Row Name 21 1000             Subjective Pain    Able to rate subjective pain?  yes  -KD      Pre-Treatment Pain Level  0  -KD      Post-Treatment Pain Level  0  -KD         Subjective Comments    Subjective Comments  No issues with the bandages.  -KD         Manual Lymphatic Drainage    Manual Lymphatic Drainage Comments  B inguinal, BLE's  -KD         Compression/Skin Care    Compression/Skin Care  -- pt had removed bandages and showered  -KD      Skin Care  moisturizing lotion applied  -KD      Wrapping Location  lower extremity  -KD      Wrapping Location LE  bilateral:;foot to knee  -KD      Wrapping Comments  Tg9, artiflex, short stretch bandages 1-8cm, 3-10cm on right , 4-10cm on left.  -KD      Compression/Skin Care Comments  Started 2 bandages on feet.  -KD        User Key  (r) = Recorded By,  (t) = Taken By, (c) = Cosigned By    Initials Name Provider Type    Ceci Esposito, PT Physical Therapist                        PT Assessment/Plan     Row Name 02/23/21 1031          PT Assessment    Assessment Comments  Pt tolerating bandaging well thus far, as expected.  -KD        PT Plan    PT Plan Comments  Continue with therapy, work toward attaining edema reduction.  -KD       User Key  (r) = Recorded By, (t) = Taken By, (c) = Cosigned By    Initials Name Provider Type    Ceci Esposito PT Physical Therapist             OP Exercises     Row Name 02/23/21 1032 02/23/21 1000          Subjective Comments    Subjective Comments  --  No issues with the bandages.  -KD        Subjective Pain    Able to rate subjective pain?  --  yes  -KD     Pre-Treatment Pain Level  --  0  -KD     Post-Treatment Pain Level  --  0  -KD        Total Minutes    48353 - PT Manual Therapy Minutes  56  -KD  --       User Key  (r) = Recorded By, (t) = Taken By, (c) = Cosigned By    Initials Name Provider Type    Ceci Esposito PT Physical Therapist                     Manual Rx (last 36 hours)      Manual Treatments     Row Name 02/23/21 1032             Total Minutes    12766 - PT Manual Therapy Minutes  56  -KD        User Key  (r) = Recorded By, (t) = Taken By, (c) = Cosigned By    Initials Name Provider Type    Ceci Esposito PT Physical Therapist              Therapy Education  Given: Bandaging/dressing change  Program: Reinforced  How Provided: Verbal  Provided to: Patient  Level of Understanding: Verbalized              Time Calculation:   Start Time: 0929  Stop Time: 1025  Time Calculation (min): 56 min  Total Timed Code Minutes- PT: 56 minute(s)   Therapy Charges for Today     Code Description Service Date Service Provider Modifiers Qty    [id number]  PT MANUAL THERAPY EA 15 MIN 2/23/2021 Ceci Ruby, PT GP 4                    Ceci Ruby PT  2/23/2021

## 2021-02-24 ENCOUNTER — HOSPITAL ENCOUNTER (OUTPATIENT)
Dept: PHYSICAL THERAPY | Facility: HOSPITAL | Age: 71
Setting detail: THERAPIES SERIES
Discharge: HOME | End: 2021-02-24

## 2021-02-24 DIAGNOSIS — I89.0 LYMPHEDEMA: Primary | ICD-10-CM

## 2021-02-24 PROCEDURE — 97140 MANUAL THERAPY 1/> REGIONS: CPT

## 2021-02-24 NOTE — THERAPY TREATMENT NOTE
Outpatient Physical Therapy Lymphedema Treatment Note  [facility]     Patient Name: Jose Hurtado  : 1950  MRN: [id number]  Today's Date: 2021        Visit Date: 2021    Visit Dx:    ICD-10-CM ICD-9-CM   1. Lymphedema  I89.0 457.1       Patient Active Problem List   Diagnosis   • Adenocarcinoma of esophagus (CMS/HCC)   • Adenomatous polyp of colon   • Malignant neoplasm of prostate (CMS/HCC)   • RLS (restless legs syndrome)   • Recurrent major depressive disorder, in partial remission (CMS/HCC)   • Hypertension   • Anemia   • Insomnia due to other mental disorder   • Peripheral polyneuropathy   • B12 deficiency   • Postsurgical malabsorption   • Lymphedema   • Iron deficiency   • Gastroparesis   • Acute deep vein thrombosis (DVT) of popliteal vein of left lower extremity (CMS/HCC)   • Tremor of both hands   • Gastrointestinal hemorrhage   • Acute blood loss anemia   • Pancytopenia (CMS/HCC)   • Chronic venous hypertension due to DVT   • COPD (chronic obstructive pulmonary disease) (CMS/HCC)   • Bleeding hemorrhoid   • Malabsorption of iron   • Iron deficiency anemia        Lymphedema     Row Name 21 1000             Subjective Pain    Able to rate subjective pain?  yes  -PC      Pre-Treatment Pain Level  0  -PC      Post-Treatment Pain Level  0  -PC         Subjective Comments    Subjective Comments  States legs already look better.  -PC         Manual Lymphatic Drainage    Manual Lymphatic Drainage Comments  B inguinal, BLE's  -PC         Compression/Skin Care    Compression/Skin Care  -- pt had removed bandages and showered  -PC      Skin Care  moisturizing lotion applied  -PC      Wrapping Location  lower extremity  -PC      Wrapping Location LE  bilateral:;foot to knee  -PC      Wrapping Comments  Tg9, artiflex, short stretch bandages 1-8cm, 3-10cm on right , 4-10cm on left.  -PC      Compression/Skin Care Comments  Started 2 bandages on feet.  -PC        User Key  (r) = Recorded  By, (t) = Taken By, (c) = Cosigned By    Initials Name Provider Type    Gogo Carroll, PT Physical Therapist                        PT Assessment/Plan     Row Name 02/24/21 1020          PT Assessment    Assessment Comments  Legs are starting to decrease in size.  -PC        PT Plan    PT Plan Comments  Cont- plan to re-measure 2/26.  -PC       User Key  (r) = Recorded By, (t) = Taken By, (c) = Cosigned By    Initials Name Provider Type    Gogo Carroll, PT Physical Therapist             OP Exercises     Row Name 02/24/21 1021 02/24/21 1000          Subjective Comments    Subjective Comments  --  States legs already look better.  -PC        Subjective Pain    Able to rate subjective pain?  --  yes  -PC     Pre-Treatment Pain Level  --  0  -PC     Post-Treatment Pain Level  --  0  -PC        Total Minutes    14895 - PT Manual Therapy Minutes  55  -PC  --       User Key  (r) = Recorded By, (t) = Taken By, (c) = Cosigned By    Initials Name Provider Type    PC Gogo eMi, PT Physical Therapist                     Manual Rx (last 36 hours)      Manual Treatments     Row Name 02/24/21 1021             Total Minutes    09171 - PT Manual Therapy Minutes  55  -PC        User Key  (r) = Recorded By, (t) = Taken By, (c) = Cosigned By    Initials Name Provider Type    Gogo Carroll, PT Physical Therapist              Therapy Education  Education Details: Discussed compression stockings. Pt needs to order new ones, says his arfe 2 yrs old.  Given: Edema management  Program: Reinforced  How Provided: Verbal  Provided to: Patient  Level of Understanding: Verbalized              Time Calculation:   Start Time: 0928  Stop Time: 1023  Time Calculation (min): 55 min  Total Timed Code Minutes- PT: 55 minute(s)   Therapy Charges for Today     Code Description Service Date Service Provider Modifiers Qty    70757726801 HC PT MANUAL THERAPY EA 15 MIN 2/24/2021 Gogo Mei, PT GP 4                    Gogo DIAZ.  Sigifredo, PT  2/24/2021

## 2021-02-25 ENCOUNTER — HOSPITAL ENCOUNTER (OUTPATIENT)
Dept: PHYSICAL THERAPY | Facility: HOSPITAL | Age: 71
Setting detail: THERAPIES SERIES
Discharge: HOME | End: 2021-02-25

## 2021-02-25 ENCOUNTER — CLINICAL SUPPORT (OUTPATIENT)
Dept: INTERNAL MEDICINE | Age: 71
End: 2021-02-25

## 2021-02-25 DIAGNOSIS — I89.0 LYMPHEDEMA: Primary | ICD-10-CM

## 2021-02-25 PROCEDURE — 96372 THER/PROPH/DIAG INJ SC/IM: CPT | Performed by: INTERNAL MEDICINE

## 2021-02-25 PROCEDURE — 97140 MANUAL THERAPY 1/> REGIONS: CPT

## 2021-02-25 RX ADMIN — CYANOCOBALAMIN 1000 MCG: 1000 INJECTION, SOLUTION INTRAMUSCULAR; SUBCUTANEOUS at 12:41

## 2021-02-25 NOTE — THERAPY TREATMENT NOTE
Outpatient Physical Therapy Lymphedema Treatment Note  [facility]     Patient Name: Jose Hurtado  : 1950  MRN: [id number]  Today's Date: 2021        Visit Date: 2021    Visit Dx:    ICD-10-CM ICD-9-CM   1. Lymphedema  I89.0 457.1       Patient Active Problem List   Diagnosis   • Adenocarcinoma of esophagus (CMS/HCC)   • Adenomatous polyp of colon   • Malignant neoplasm of prostate (CMS/HCC)   • RLS (restless legs syndrome)   • Recurrent major depressive disorder, in partial remission (CMS/HCC)   • Hypertension   • Anemia   • Insomnia due to other mental disorder   • Peripheral polyneuropathy   • B12 deficiency   • Postsurgical malabsorption   • Lymphedema   • Iron deficiency   • Gastroparesis   • Acute deep vein thrombosis (DVT) of popliteal vein of left lower extremity (CMS/HCC)   • Tremor of both hands   • Gastrointestinal hemorrhage   • Acute blood loss anemia   • Pancytopenia (CMS/HCC)   • Chronic venous hypertension due to DVT   • COPD (chronic obstructive pulmonary disease) (CMS/HCC)   • Bleeding hemorrhoid   • Malabsorption of iron   • Iron deficiency anemia        Lymphedema     Row Name 21 1000             Subjective Pain    Able to rate subjective pain?  yes  -KD      Pre-Treatment Pain Level  0  -KD      Post-Treatment Pain Level  0  -KD         Subjective Comments    Subjective Comments  States even his left leg is down some.  -KD         Manual Lymphatic Drainage    Manual Lymphatic Drainage Comments  B inguinal, BLE's  -KD         Compression/Skin Care    Compression/Skin Care  -- pt had removed bandages and showered  -KD      Skin Care  moisturizing lotion applied  -KD      Wrapping Location  lower extremity  -KD      Wrapping Location LE  bilateral:;foot to knee  -KD      Wrapping Comments  Tg9, artiflex, short stretch bandages 1-8cm, 3-10cm on right , 4-10cm on left.  -KD      Compression/Skin Care Comments  Started 2 bandages on feet.  -KD        User Key  (r) =  Recorded By, (t) = Taken By, (c) = Cosigned By    Initials Name Provider Type    Ceci Esposito, MANDA Physical Therapist                        PT Assessment/Plan     Row Name 02/25/21 1055          PT Assessment    Assessment Comments  Pt responding to treatment, edema appears to be decreasing.  -KD        PT Plan    PT Plan Comments  Continue with therapy, focus on edema reduction and follow-through at home as able.  -KD       User Key  (r) = Recorded By, (t) = Taken By, (c) = Cosigned By    Initials Name Provider Type    Ceci Esposito PT Physical Therapist             OP Exercises     Row Name 02/25/21 1056 02/25/21 1000          Subjective Comments    Subjective Comments  --  States even his left leg is down some.  -KD        Subjective Pain    Able to rate subjective pain?  --  yes  -KD     Pre-Treatment Pain Level  --  0  -KD     Post-Treatment Pain Level  --  0  -KD        Total Minutes    63372 - PT Manual Therapy Minutes  61  -KD  --       User Key  (r) = Recorded By, (t) = Taken By, (c) = Cosigned By    Initials Name Provider Type    Ceci Esposito PT Physical Therapist                     Manual Rx (last 36 hours)      Manual Treatments     Row Name 02/25/21 1056             Total Minutes    49213 - PT Manual Therapy Minutes  61  -KD        User Key  (r) = Recorded By, (t) = Taken By, (c) = Cosigned By    Initials Name Provider Type    Ceci Esposito PT Physical Therapist              Therapy Education  Given: Bandaging/dressing change  Program: Reinforced  How Provided: Verbal  Provided to: Patient  Level of Understanding: Verbalized              Time Calculation:   Start Time: 0930  Stop Time: 1031  Time Calculation (min): 61 min  Total Timed Code Minutes- PT: 61 minute(s)   Therapy Charges for Today     Code Description Service Date Service Provider Modifiers Qty    [id number] HC PT MANUAL THERAPY EA 15 MIN 2/25/2021 Ceci Ruby, PT GP 4                    Ceci Ruby  PT  2/25/2021

## 2021-02-26 ENCOUNTER — HOSPITAL ENCOUNTER (OUTPATIENT)
Dept: PHYSICAL THERAPY | Facility: HOSPITAL | Age: 71
Setting detail: THERAPIES SERIES
Discharge: HOME | End: 2021-02-26

## 2021-02-26 DIAGNOSIS — I89.0 LYMPHEDEMA: Primary | ICD-10-CM

## 2021-02-26 PROCEDURE — 97140 MANUAL THERAPY 1/> REGIONS: CPT

## 2021-02-26 NOTE — THERAPY TREATMENT NOTE
Outpatient Physical Therapy Lymphedema Progress Note  [facility]     Patient Name: Jose Hurtado  : 1950  MRN: [id number]  Today's Date: 2021        Visit Date: 2021    Visit Dx:    ICD-10-CM ICD-9-CM   1. Lymphedema  I89.0 457.1       Patient Active Problem List   Diagnosis   • Adenocarcinoma of esophagus (CMS/HCC)   • Adenomatous polyp of colon   • Malignant neoplasm of prostate (CMS/HCC)   • RLS (restless legs syndrome)   • Recurrent major depressive disorder, in partial remission (CMS/HCC)   • Hypertension   • Anemia   • Insomnia due to other mental disorder   • Peripheral polyneuropathy   • B12 deficiency   • Postsurgical malabsorption   • Lymphedema   • Iron deficiency   • Gastroparesis   • Acute deep vein thrombosis (DVT) of popliteal vein of left lower extremity (CMS/HCC)   • Tremor of both hands   • Gastrointestinal hemorrhage   • Acute blood loss anemia   • Pancytopenia (CMS/HCC)   • Chronic venous hypertension due to DVT   • COPD (chronic obstructive pulmonary disease) (CMS/HCC)   • Bleeding hemorrhoid   • Malabsorption of iron   • Iron deficiency anemia        Lymphedema     Row Name 21 1000             Subjective Pain    Able to rate subjective pain?  yes  -PC      Pre-Treatment Pain Level  0  -PC      Post-Treatment Pain Level  0  -PC         Subjective Comments    Subjective Comments  States he left bandages on. Usually takes them off at night.  -PC         Lymphedema Edema Assessment    Edema Assessment Comment  B legs appear decreased in size.  -PC         Lymphedema Measurements    Measurement Type(s)  Circumferential  -PC      Circumferential Areas  Lower extremities  -PC         BLE Circumferential (cm)    Measurement Location 1  Knee  -PC      Left 1  43 cm  -PC      Right 1  40 cm  -PC      Measurement Location 2  10cm below knee  -PC      Left 2  42 cm  -PC      Right 2  38 cm  -PC      Measurement Location 3  20cm below knee  -PC      Left 3  41.2 cm  -PC       Right 3  37.7 cm  -PC      Measurement Location 4  30cm below knee  -PC      Left 4  35.5 cm  -PC      Right 4  31.5 cm  -PC      Measurement Location 5  Ankle  -PC      Left 5  28.5 cm  -PC      Right 5  25.5 cm  -PC      Measurement Location 6  Midfoot  -PC      Left 6  25 cm  -PC      Right 6  24.3 cm  -PC      LLE Circumferential Total  215.2 cm  -PC      RLE Circumferential Total  197 cm  -PC         Manual Lymphatic Drainage    Manual Lymphatic Drainage Comments  B inguinal, BLE's  -PC         Compression/Skin Care    Compression/Skin Care  remove bandages  -PC      Skin Care  washed/dried;moisturizing lotion applied Eucerin  -PC      Wrapping Location  lower extremity  -PC      Wrapping Location LE  bilateral:;foot to knee  -PC      Wrapping Comments  Tg9, artiflex, short stretch bandages 1-8cm, 3-10cm on right , 4-10cm on left.  -PC      Compression/Skin Care Comments  Started 2 bandages on feet.   -PC        User Key  (r) = Recorded By, (t) = Taken By, (c) = Cosigned By    Initials Name Provider Type    Gogo Carroll, PT Physical Therapist                        PT Assessment/Plan     Row Name 02/26/21 1201          PT Assessment    Assessment Comments  Msmts have dcreased by 3.5cm on right and by 10.3cm on right. Reminded pt to keep bandages on over night if possible. This should help msmts go down faster.  -PC        PT Plan    PT Plan Comments  Cont per POC.  -PC       User Key  (r) = Recorded By, (t) = Taken By, (c) = Cosigned By    Initials Name Provider Type    Gogo Carroll, PT Physical Therapist             OP Exercises     Row Name 02/26/21 1050 02/26/21 1000          Subjective Comments    Subjective Comments  --  States he left bandages on. Usually takes them off at night.  -PC        Subjective Pain    Able to rate subjective pain?  --  yes  -PC     Pre-Treatment Pain Level  --  0  -PC     Post-Treatment Pain Level  --  0  -PC        Total Minutes    11152 - PT Manual Therapy Minutes   55  -PC  --       User Key  (r) = Recorded By, (t) = Taken By, (c) = Cosigned By    Initials Name Provider Type    PC Gogo Mei, PT Physical Therapist                     Manual Rx (last 36 hours)      Manual Treatments     Row Name 02/26/21 1050             Total Minutes    30710 - PT Manual Therapy Minutes  55  -PC        User Key  (r) = Recorded By, (t) = Taken By, (c) = Cosigned By    Initials Name Provider Type    PC Gogo Mei, PT Physical Therapist          PT OP Goals     Row Name 02/26/21 1100          PT Short Term Goals    STG Date to Achieve  03/08/21  -PC     STG 1  Pt demo awareness of condition and precautions for improved prevention, management, care of symptoms, and ease of transition to self-care of condition.  -PC     STG 1 Progress  Ongoing  -PC     STG 2  Pt/family independent with self-wrapping techniques of compression bandages as indicated for improved self-management of condition.  -PC     STG 2 Progress  Ongoing  -PC     STG 3  Pt demo decreased net edema of >/=5-10cm for decreased edema symptoms, decreased risk of infection, and improved skin care.  -PC     STG 3 Progress  Progressing  -PC     STG 3 Progress Comments  R leg has decreased by 3.5cm, left by 10.3cm so far.  -PC        Long Term Goals    LTG Date to Achieve  03/22/21  -PC     LTG 1  Pt/family independent with self care techniques for self-management of condition.  -PC     LTG 1 Progress  Ongoing  -PC     LTG 2  Pt demo decreased net edema of >/=10-20cm for decreased edema symptoms, decreased risk of infection, and improved skin care.  -PC     LTG 2 Progress  Ongoing  -PC     LTG 3  Pt/family independent with compression garments as indicated for self-management of condition.  -PC     LTG 3 Progress  Ongoing  -PC        Time Calculation    PT Goal Re-Cert Due Date  05/21/21  -PC       User Key  (r) = Recorded By, (t) = Taken By, (c) = Cosigned By    Initials Name Provider Type    Gogo Carroll, PT Physical  Therapist          Therapy Education  Education Details: Reminded pt to leave bandages on until he gets up in am instead of removing them at night.  Try to keep bandages on all weekend.  If he has to remove he should don his stocking.  Given: Edema management, Symptoms/condition management  Program: Reinforced  How Provided: Verbal  Provided to: Patient  Level of Understanding: Verbalized              Time Calculation:   Start Time: 0933  Stop Time: 1028  Time Calculation (min): 55 min  Total Timed Code Minutes- PT: 55 minute(s)   Therapy Charges for Today     Code Description Service Date Service Provider Modifiers Qty    87950411906 HC PT MANUAL THERAPY EA 15 MIN 2/26/2021 Gogo Mei, PT GP 4                    Gogo Mei, PT  2/26/2021

## 2021-03-01 ENCOUNTER — TREATMENT (OUTPATIENT)
Dept: PHYSICAL THERAPY | Facility: CLINIC | Age: 71
End: 2021-03-01

## 2021-03-01 ENCOUNTER — HOSPITAL ENCOUNTER (OUTPATIENT)
Dept: PHYSICAL THERAPY | Facility: HOSPITAL | Age: 71
Setting detail: THERAPIES SERIES
Discharge: HOME | End: 2021-03-01

## 2021-03-01 DIAGNOSIS — I89.0 LYMPHEDEMA: Primary | ICD-10-CM

## 2021-03-01 DIAGNOSIS — R26.9 GAIT DISTURBANCE: Primary | ICD-10-CM

## 2021-03-01 DIAGNOSIS — M25.661 KNEE JOINT STIFFNESS, BILATERAL: ICD-10-CM

## 2021-03-01 DIAGNOSIS — R29.898 WEAKNESS OF BOTH HIPS: ICD-10-CM

## 2021-03-01 DIAGNOSIS — M25.652 HIP JOINT STIFFNESS, BILATERAL: ICD-10-CM

## 2021-03-01 DIAGNOSIS — M25.651 HIP JOINT STIFFNESS, BILATERAL: ICD-10-CM

## 2021-03-01 DIAGNOSIS — M25.662 KNEE JOINT STIFFNESS, BILATERAL: ICD-10-CM

## 2021-03-01 PROCEDURE — 97140 MANUAL THERAPY 1/> REGIONS: CPT

## 2021-03-01 PROCEDURE — 97162 PT EVAL MOD COMPLEX 30 MIN: CPT | Performed by: PHYSICAL THERAPIST

## 2021-03-01 NOTE — THERAPY TREATMENT NOTE
Outpatient Physical Therapy Lymphedema Treatment Note  [facility]     Patient Name: Jose Hurtado  : 1950  MRN: [id number]  Today's Date: 3/1/2021        Visit Date: 2021    Visit Dx:    ICD-10-CM ICD-9-CM   1. Lymphedema  I89.0 457.1       Patient Active Problem List   Diagnosis   • Adenocarcinoma of esophagus (CMS/HCC)   • Adenomatous polyp of colon   • Malignant neoplasm of prostate (CMS/HCC)   • RLS (restless legs syndrome)   • Recurrent major depressive disorder, in partial remission (CMS/HCC)   • Hypertension   • Anemia   • Insomnia due to other mental disorder   • Peripheral polyneuropathy   • B12 deficiency   • Postsurgical malabsorption   • Lymphedema   • Iron deficiency   • Gastroparesis   • Acute deep vein thrombosis (DVT) of popliteal vein of left lower extremity (CMS/HCC)   • Tremor of both hands   • Gastrointestinal hemorrhage   • Acute blood loss anemia   • Pancytopenia (CMS/HCC)   • Chronic venous hypertension due to DVT   • COPD (chronic obstructive pulmonary disease) (CMS/HCC)   • Bleeding hemorrhoid   • Malabsorption of iron   • Iron deficiency anemia        Lymphedema     Row Name 21 1000             Subjective Pain    Able to rate subjective pain?  yes  -PC      Pre-Treatment Pain Level  0  -PC      Post-Treatment Pain Level  0  -PC         Subjective Comments    Subjective Comments  Pt states hewas able to keep the bandages on until late last night. States his legs are looking better.  -PC         Skin Changes/Observations    Skin Observations Comment  During MLD pt has spasms in legs that he says are restless legs. They contract into a neuro pattern of IR, flex, and add.   -PC         Manual Lymphatic Drainage    Manual Lymphatic Drainage Comments  B inguinal, BLE's  -PC         Compression/Skin Care    Compression/Skin Care  -- Pt had already removed bandages and showered  -PC      Skin Care  moisturizing lotion applied Eucerin  -PC      Wrapping Location  lower  extremity  -PC      Wrapping Location LE  bilateral:;foot to knee  -PC      Wrapping Comments  Tg9, artiflex, short stretch bandages 1-8cm, 3-10cm on right , 4-10cm on left.  -PC      Compression/Skin Care Comments  Started 2 bandages on feet.   -PC        User Key  (r) = Recorded By, (t) = Taken By, (c) = Cosigned By    Initials Name Provider Type    PC Gogo Mei, PT Physical Therapist                        PT Assessment/Plan     Row Name 03/01/21 1029          PT Assessment    Assessment Comments  Pt able to keep bandages on all weekend until late last night. Legs cont to improve.  -PC        PT Plan    PT Plan Comments  Cont- pt had to cancel for tmro (MD appt), but will cont 3/3.  -PC       User Key  (r) = Recorded By, (t) = Taken By, (c) = Cosigned By    Initials Name Provider Type    PC Gogo Mei, PT Physical Therapist             OP Exercises     Row Name 03/01/21 1031 03/01/21 1000          Subjective Comments    Subjective Comments  --  Pt states hewas able to keep the bandages on until late last night. States his legs are looking better.  -PC        Subjective Pain    Able to rate subjective pain?  --  yes  -PC     Pre-Treatment Pain Level  --  0  -PC     Post-Treatment Pain Level  --  0  -PC        Total Minutes    35623 - PT Manual Therapy Minutes  61  -PC  --       User Key  (r) = Recorded By, (t) = Taken By, (c) = Cosigned By    Initials Name Provider Type    PC Gogo Mei, PT Physical Therapist                     Manual Rx (last 36 hours)      Manual Treatments     Row Name 03/01/21 1031             Total Minutes    03922 - PT Manual Therapy Minutes  61  -PC        User Key  (r) = Recorded By, (t) = Taken By, (c) = Cosigned By    Initials Name Provider Type    Gogo Carroll, PT Physical Therapist              Therapy Education  Education Details: Discussed lymphatic system and where the lymph fluid goes.  Given: Edema management, Symptoms/condition management  Program:  Reinforced  How Provided: Verbal  Provided to: Patient  Level of Understanding: Verbalized              Time Calculation:   Start Time: 0930  Stop Time: 1031  Time Calculation (min): 61 min  Total Timed Code Minutes- PT: 61 minute(s)   Therapy Charges for Today     Code Description Service Date Service Provider Modifiers Qty    51248085082 HC PT MANUAL THERAPY EA 15 MIN 3/1/2021 Gogo Mei, PT GP 4                    Gogo Mei, PT  3/1/2021

## 2021-03-01 NOTE — PROGRESS NOTES
"Physical Therapy Initial Evaluation and Plan of Care      Patient: Jose Hurtado   : 1950  Diagnosis/ICD-10 Code:  Gait disturbance [R26.9]  Referring practitioner: Brandin Bolton MD    Subjective Evaluation    History of Present Illness  Mechanism of injury: R TKA 3 years. \"Dr. Solis won't touch my L knee\".  Lymphedema LLE. Better.  One year ago in hospital for pneumonia. No PT. Working with Gonzalo at [facility] 3x/week for past couple months.  Functional level:   BAlance: 5/10  Gait 3/10  Strength: 7/10  R knee pain; L mod pain  Low back with standing  L shoulder stiffer        Patient Occupation: retired  Pain  Quality: discomfort, pulling and tight  Relieving factors: rest  Aggravating factors: ambulation and standing  Progression: improved    Social Support  Lives in: one-story house  Lives with: spouse    Treatments  Previous treatment: physical therapy  Current treatment: physical therapy  Patient Goals  Patient goals for therapy: increased strength, increased motion and independence with ADLs/IADLs  Patient goal: improve gait           Objective          Active Range of Motion   Left Knee   Flexion: 119 degrees   Extensor la degrees with pain    Right Knee   Flexion: 125 degrees   Extensor lag: 10 degrees with pain    Passive Range of Motion   Left Hip   Flexion: WFL  Extension: Left hip passive extension: to neutral     Joint Play   Left Hip   Hypomobile in the anterior hip capsule and lateral hip capsule.    Right Hip     Hypomobile in the anterior hip capsule and lateral hip capsule    Strength/Myotome Testing     Left Hip   Planes of Motion   Flexion: 4  Extension: 3  Abduction: 3+  Adduction: 5  External rotation: 4  Internal rotation: 4+    Right Hip   Planes of Motion   Flexion: 4+  Extension: 3  Abduction: 4  Adduction: 5  External rotation: 4+  Internal rotation: 5    Ambulation   Weight-Bearing Status   Weight-Bearing Status (Left): full weight bearing   Weight-Bearing Status (Right): " full weight-bearing    Assistive device used: none    Ambulation: Level Surfaces   Ambulation with assistive device: independent    Observational Gait   Gait: asymmetric   Decreased walking speed, stride length, right swing time, left step length and right step length.   Left foot contact pattern: foot flat  Right foot contact pattern: heel to toe  Left arm swing: decreased  Right arm swing: decreased    Additional Observational Gait Details  FLexed at hips and knees B    Functional Assessment   Squat   Left valgus, left tibial anterior translation beyond toes, trunk lean right and right tibial anterior translation beyond toes.     Single Leg Stance   Left: 0 seconds  Right: 0 seconds    Comments  5TSTS: 5.24 with hands; 20 sec w/o  TU sec.    ABC 49%     General Comments     Knee Comments  Significant lymphedema L ankle and calf. Pt states it was twice as big last week         Assessment & Plan     Assessment  Impairments: abnormal gait, abnormal or restricted ROM, activity intolerance and lacks appropriate home exercise program  Assessment details: Pt is a good candidate for skilled PT intervention to restore functional AROM and strength to return to previous level of ADL's.  Prognosis: fair  Prognosis details: Very restricted hip and knee joint and soft tissue mobility  Functional Limitations: walking and standing  Goals  Plan Goals: Short Term Goals: (3 weeks)  1. Pt to demonstrate heel toe gait with clinic ambulation  2. Pt to exhibit increased passive knee extension by 5 degrees to assist with improved heel strike B   3.. Pt to be (I) with HEP    Long Term Goals: (8 weeks)  1. Pt able to traverse uneven ground, step over curbs without LOB in the community  2. Pt able to ascend/descend 5 steps without rail assist  3. Pt to score 80% confidence on ABC scale  4. Pt to exhibit 5/5 hip abduction strength L to help correct valgus at L knee    Plan  Therapy options: will be seen for skilled physical therapy  services  Planned modality interventions: thermotherapy (hydrocollator packs)  Planned therapy interventions: abdominal trunk stabilization, balance/weight-bearing training, body mechanics training, functional ROM exercises, home exercise program, joint mobilization, gait training, manual therapy, postural training, soft tissue mobilization, strengthening, stretching and therapeutic activities  Frequency: 2x week  Duration in weeks: 8  Treatment plan discussed with: patient        Manual Therapy:         mins  15414;  Therapeutic Exercise:         mins  51674;     Neuromuscular Wil:        mins  37090;    Therapeutic Activity:     5     mins  94612;     Gait Training:           mins  53498;     Ultrasound:          mins  27747;    Electrical Stimulation:         mins  95872 ( );  Dry Needling          mins self-pay    Timed Treatment:   5   mins   Total Treatment:     40   mins    PT SIGNATURE: Caroline Vasquez PT   [place] License # [id number]  DATE TREATMENT INITIATED: 3/1/2021    Medicare Initial Certification  Certification Period: 5/30/2021  I certify that the therapy services are furnished while this patient is under my care.  The services outlined above are required by this patient, and will be reviewed every 90 days.     PHYSICIAN: Brandin Bolton MD      DATE:     Please sign and return via fax to [phone].. Thank you, [facility].

## 2021-03-02 ENCOUNTER — APPOINTMENT (OUTPATIENT)
Dept: PHYSICAL THERAPY | Facility: HOSPITAL | Age: 71
End: 2021-03-02

## 2021-03-02 ENCOUNTER — LAB (OUTPATIENT)
Dept: OTHER | Facility: HOSPITAL | Age: 71
End: 2021-03-02

## 2021-03-02 ENCOUNTER — TELEPHONE (OUTPATIENT)
Dept: GENERAL RADIOLOGY | Facility: HOSPITAL | Age: 71
End: 2021-03-02

## 2021-03-02 ENCOUNTER — CLINICAL SUPPORT (OUTPATIENT)
Dept: ONCOLOGY | Facility: HOSPITAL | Age: 71
End: 2021-03-02

## 2021-03-02 VITALS
DIASTOLIC BLOOD PRESSURE: 71 MMHG | OXYGEN SATURATION: 99 % | HEART RATE: 75 BPM | BODY MASS INDEX: 24.87 KG/M2 | WEIGHT: 210.6 LBS | RESPIRATION RATE: 18 BRPM | SYSTOLIC BLOOD PRESSURE: 138 MMHG | HEIGHT: 77 IN | TEMPERATURE: 97.3 F

## 2021-03-02 DIAGNOSIS — D50.9 IRON DEFICIENCY ANEMIA, UNSPECIFIED IRON DEFICIENCY ANEMIA TYPE: ICD-10-CM

## 2021-03-02 LAB
BASOPHILS # BLD AUTO: 0.03 10*3/MM3 (ref 0–0.2)
BASOPHILS NFR BLD AUTO: 0.7 % (ref 0–1.5)
DEPRECATED RDW RBC AUTO: 48.8 FL (ref 37–54)
EOSINOPHIL # BLD AUTO: 0.26 10*3/MM3 (ref 0–0.4)
EOSINOPHIL NFR BLD AUTO: 6.1 % (ref 0.3–6.2)
ERYTHROCYTE [DISTWIDTH] IN BLOOD BY AUTOMATED COUNT: 14.6 % (ref 12.3–15.4)
FERRITIN SERPL-MCNC: 193.4 NG/ML (ref 30–400)
HCT VFR BLD AUTO: 37.6 % (ref 37.5–51)
HGB BLD-MCNC: 11.9 G/DL (ref 13–17.7)
HGB RETIC QN AUTO: 31.4 PG (ref 29.8–36.1)
IMM GRANULOCYTES # BLD AUTO: 0.01 10*3/MM3 (ref 0–0.05)
IMM GRANULOCYTES NFR BLD AUTO: 0.2 % (ref 0–0.5)
IMM RETICS NFR: 10.9 % (ref 3–15.8)
IRON 24H UR-MRATE: 36 MCG/DL (ref 59–158)
IRON SATN MFR SERPL: 11 % (ref 20–50)
LYMPHOCYTES # BLD AUTO: 1.62 10*3/MM3 (ref 0.7–3.1)
LYMPHOCYTES NFR BLD AUTO: 38.3 % (ref 19.6–45.3)
MCH RBC QN AUTO: 28.8 PG (ref 26.6–33)
MCHC RBC AUTO-ENTMCNC: 31.6 G/DL (ref 31.5–35.7)
MCV RBC AUTO: 91 FL (ref 79–97)
MONOCYTES # BLD AUTO: 0.52 10*3/MM3 (ref 0.1–0.9)
MONOCYTES NFR BLD AUTO: 12.3 % (ref 5–12)
NEUTROPHILS NFR BLD AUTO: 1.79 10*3/MM3 (ref 1.7–7)
NEUTROPHILS NFR BLD AUTO: 42.4 % (ref 42.7–76)
NRBC BLD AUTO-RTO: 0 /100 WBC (ref 0–0.2)
PLATELET # BLD AUTO: 151 10*3/MM3 (ref 140–450)
PMV BLD AUTO: 9 FL (ref 6–12)
RBC # BLD AUTO: 4.13 10*6/MM3 (ref 4.14–5.8)
RETICS # AUTO: 0.02 10*6/MM3 (ref 0.02–0.13)
RETICS/RBC NFR AUTO: 0.54 % (ref 0.7–1.9)
TIBC SERPL-MCNC: 329 MCG/DL (ref 298–536)
TRANSFERRIN SERPL-MCNC: 221 MG/DL (ref 200–360)
WBC # BLD AUTO: 4.23 10*3/MM3 (ref 3.4–10.8)

## 2021-03-02 PROCEDURE — 83540 ASSAY OF IRON: CPT | Performed by: INTERNAL MEDICINE

## 2021-03-02 PROCEDURE — 82728 ASSAY OF FERRITIN: CPT | Performed by: INTERNAL MEDICINE

## 2021-03-02 PROCEDURE — 85025 COMPLETE CBC W/AUTO DIFF WBC: CPT | Performed by: INTERNAL MEDICINE

## 2021-03-02 PROCEDURE — 36415 COLL VENOUS BLD VENIPUNCTURE: CPT

## 2021-03-02 PROCEDURE — 85046 RETICYTE/HGB CONCENTRATE: CPT | Performed by: INTERNAL MEDICINE

## 2021-03-02 PROCEDURE — 84466 ASSAY OF TRANSFERRIN: CPT | Performed by: INTERNAL MEDICINE

## 2021-03-02 PROCEDURE — G0463 HOSPITAL OUTPT CLINIC VISIT: HCPCS

## 2021-03-02 NOTE — NURSING NOTE
Pt is here for lab with RN review.  CBC reviewed with pt, counts are stable for this pt at this time. Pt has no complaints.  Copy of labs given to pt and f/u appt reviewed. Pt is instructed to call the office with any concerns or new symptoms prior to next visit. Pt vu  Lab Results   Component Value Date    WBC 4.23 03/02/2021    HGB 11.9 (L) 03/02/2021    HCT 37.6 03/02/2021    MCV 91.0 03/02/2021     03/02/2021     Iron Sat 11.  Ferritin 193.  Hgb 11.9.  Reviewed with Dr. Phelan. He would like for pt to receive injectafer x 1 dose.  Informed Rupinder ALAN and she will place orders and call pt to get it scheduled.  We are to leave all of his other appts the same at this time.

## 2021-03-02 NOTE — TELEPHONE ENCOUNTER
CALLED PT WITH HIS APPT AND I HAVE SCHEDULED PT AT HIS REQUEST OF 2:30PM AS HE IS COMING FROM ANOTHER APPT

## 2021-03-02 NOTE — TELEPHONE ENCOUNTER
----- Message from Rupinder Erazo RN sent at 3/2/2021 12:59 PM EST -----  Regarding: temitope Phelan wants this patient to get one dose of Injectafer. It is already in. If you let me know when it gets scheduled, I can call the patient.     Thank you

## 2021-03-03 ENCOUNTER — HOSPITAL ENCOUNTER (OUTPATIENT)
Dept: PHYSICAL THERAPY | Facility: HOSPITAL | Age: 71
Setting detail: THERAPIES SERIES
Discharge: HOME | End: 2021-03-03

## 2021-03-03 DIAGNOSIS — I89.0 LYMPHEDEMA: Primary | ICD-10-CM

## 2021-03-03 DIAGNOSIS — Z23 IMMUNIZATION DUE: ICD-10-CM

## 2021-03-03 PROCEDURE — 97140 MANUAL THERAPY 1/> REGIONS: CPT

## 2021-03-03 NOTE — THERAPY TREATMENT NOTE
Outpatient Physical Therapy Lymphedema Treatment Note  [facility]     Patient Name: Jose Hurtado  : 1950  MRN: [id number]  Today's Date: 3/3/2021        Visit Date: 2021    Visit Dx:    ICD-10-CM ICD-9-CM   1. Lymphedema  I89.0 457.1       Patient Active Problem List   Diagnosis   • Adenocarcinoma of esophagus (CMS/HCC)   • Adenomatous polyp of colon   • Malignant neoplasm of prostate (CMS/HCC)   • RLS (restless legs syndrome)   • Recurrent major depressive disorder, in partial remission (CMS/HCC)   • Hypertension   • Anemia   • Insomnia due to other mental disorder   • Peripheral polyneuropathy   • B12 deficiency   • Postsurgical malabsorption   • Lymphedema   • Iron deficiency   • Gastroparesis   • Acute deep vein thrombosis (DVT) of popliteal vein of left lower extremity (CMS/HCC)   • Tremor of both hands   • Gastrointestinal hemorrhage   • Acute blood loss anemia   • Pancytopenia (CMS/HCC)   • Chronic venous hypertension due to DVT   • COPD (chronic obstructive pulmonary disease) (CMS/HCC)   • Bleeding hemorrhoid   • Malabsorption of iron   • Iron deficiency anemia        Lymphedema     Row Name 21 1000             Subjective Pain    Able to rate subjective pain?  yes  -PC      Pre-Treatment Pain Level  0  -PC      Post-Treatment Pain Level  0  -PC         Subjective Comments    Subjective Comments  Pleased with progress.  -PC         Manual Lymphatic Drainage    Manual Lymphatic Drainage Comments  B inguinal, BLE's  -PC         Compression/Skin Care    Compression/Skin Care  -- Pt had already removed bandages and showered  -PC      Skin Care  moisturizing lotion applied Eucerin  -PC      Wrapping Location  lower extremity  -PC      Wrapping Location LE  bilateral:;foot to knee  -PC      Wrapping Comments  Tg9, artiflex, short stretch bandages 1-8cm, 3-10cm on right , 4-10cm on left.  -PC      Compression/Skin Care Comments  Started 2 bandages on feet.   -PC        User Key  (r) =  Recorded By, (t) = Taken By, (c) = Cosigned By    Initials Name Provider Type    PC Gogo Mei, PT Physical Therapist                        PT Assessment/Plan     Row Name 03/03/21 1031          PT Assessment    Assessment Comments  Pt's legs cont to appear smaller each day.  -PC        PT Plan    PT Plan Comments  Cont- re-measure 3/5.  -PC       User Key  (r) = Recorded By, (t) = Taken By, (c) = Cosigned By    Initials Name Provider Type    PC Gogo Mei, PT Physical Therapist             OP Exercises     Row Name 03/03/21 1032 03/03/21 1000          Subjective Comments    Subjective Comments  --  Pleased with progress.  -PC        Subjective Pain    Able to rate subjective pain?  --  yes  -PC     Pre-Treatment Pain Level  --  0  -PC     Post-Treatment Pain Level  --  0  -PC        Total Minutes    41799 - PT Manual Therapy Minutes  58  -PC  --       User Key  (r) = Recorded By, (t) = Taken By, (c) = Cosigned By    Initials Name Provider Type     Gogo Mei, PT Physical Therapist                     Manual Rx (last 36 hours)      Manual Treatments     Row Name 03/03/21 1032             Total Minutes    19386 - PT Manual Therapy Minutes  58  -PC        User Key  (r) = Recorded By, (t) = Taken By, (c) = Cosigned By    Initials Name Provider Type     Gogo Mei, PT Physical Therapist              Therapy Education  Given: Edema management, Symptoms/condition management  Program: Reinforced  How Provided: Verbal  Provided to: Patient  Level of Understanding: Verbalized              Time Calculation:   Start Time: 0934  Stop Time: 1032  Time Calculation (min): 58 min  Total Timed Code Minutes- PT: 58 minute(s)   Therapy Charges for Today     Code Description Service Date Service Provider Modifiers Qty    33764655339  PT MANUAL THERAPY EA 15 MIN 3/3/2021 Gogo Mei, PT GP 4                    Gogo Mei PT  3/3/2021

## 2021-03-04 ENCOUNTER — INFUSION (OUTPATIENT)
Dept: ONCOLOGY | Facility: HOSPITAL | Age: 71
End: 2021-03-04

## 2021-03-04 ENCOUNTER — HOSPITAL ENCOUNTER (OUTPATIENT)
Dept: PHYSICAL THERAPY | Facility: HOSPITAL | Age: 71
Setting detail: THERAPIES SERIES
Discharge: HOME | End: 2021-03-04

## 2021-03-04 VITALS
DIASTOLIC BLOOD PRESSURE: 69 MMHG | SYSTOLIC BLOOD PRESSURE: 128 MMHG | HEART RATE: 76 BPM | HEIGHT: 77 IN | TEMPERATURE: 97.3 F | BODY MASS INDEX: 24.85 KG/M2 | OXYGEN SATURATION: 97 % | RESPIRATION RATE: 18 BRPM | WEIGHT: 210.5 LBS

## 2021-03-04 DIAGNOSIS — I89.0 LYMPHEDEMA: Primary | ICD-10-CM

## 2021-03-04 DIAGNOSIS — K90.9 MALABSORPTION OF IRON: Primary | ICD-10-CM

## 2021-03-04 DIAGNOSIS — D50.9 IRON DEFICIENCY ANEMIA, UNSPECIFIED IRON DEFICIENCY ANEMIA TYPE: ICD-10-CM

## 2021-03-04 PROCEDURE — 25010000002 FERRIC CARBOXYMALTOSE 750 MG/15ML SOLUTION 15 ML VIAL: Performed by: NURSE PRACTITIONER

## 2021-03-04 PROCEDURE — 96374 THER/PROPH/DIAG INJ IV PUSH: CPT

## 2021-03-04 PROCEDURE — 97140 MANUAL THERAPY 1/> REGIONS: CPT

## 2021-03-04 PROCEDURE — 63710000001 PROCHLORPERAZINE MALEATE PER 5 MG: Performed by: NURSE PRACTITIONER

## 2021-03-04 RX ORDER — PROCHLORPERAZINE MALEATE 5 MG/1
10 TABLET ORAL ONCE
Status: COMPLETED | OUTPATIENT
Start: 2021-03-04 | End: 2021-03-04

## 2021-03-04 RX ORDER — SODIUM CHLORIDE 9 MG/ML
250 INJECTION, SOLUTION INTRAVENOUS ONCE
Status: COMPLETED | OUTPATIENT
Start: 2021-03-04 | End: 2021-03-04

## 2021-03-04 RX ADMIN — FERRIC CARBOXYMALTOSE INJECTION 750 MG: 50 INJECTION, SOLUTION INTRAVENOUS at 14:27

## 2021-03-04 RX ADMIN — PROCHLORPERAZINE MALEATE 10 MG: 5 TABLET ORAL at 14:22

## 2021-03-04 RX ADMIN — SODIUM CHLORIDE 250 ML: 9 INJECTION, SOLUTION INTRAVENOUS at 14:15

## 2021-03-04 NOTE — THERAPY TREATMENT NOTE
Outpatient Physical Therapy Lymphedema Treatment Note  [facility]     Patient Name: Jose Hurtado  : 1950  MRN: [id number]  Today's Date: 3/4/2021        Visit Date: 2021    Visit Dx:    ICD-10-CM ICD-9-CM   1. Lymphedema  I89.0 457.1       Patient Active Problem List   Diagnosis   • Adenocarcinoma of esophagus (CMS/HCC)   • Adenomatous polyp of colon   • Malignant neoplasm of prostate (CMS/HCC)   • RLS (restless legs syndrome)   • Recurrent major depressive disorder, in partial remission (CMS/HCC)   • Hypertension   • Anemia   • Insomnia due to other mental disorder   • Peripheral polyneuropathy   • B12 deficiency   • Postsurgical malabsorption   • Lymphedema   • Iron deficiency   • Gastroparesis   • Acute deep vein thrombosis (DVT) of popliteal vein of left lower extremity (CMS/HCC)   • Tremor of both hands   • Gastrointestinal hemorrhage   • Acute blood loss anemia   • Pancytopenia (CMS/HCC)   • Chronic venous hypertension due to DVT   • COPD (chronic obstructive pulmonary disease) (CMS/HCC)   • Bleeding hemorrhoid   • Malabsorption of iron   • Iron deficiency anemia        Lymphedema     Row Name 21 1000             Subjective Pain    Able to rate subjective pain?  yes  -KD      Pre-Treatment Pain Level  0  -KD      Post-Treatment Pain Level  0  -KD         Subjective Comments    Subjective Comments  States his legs are really looking good.  -KD         Manual Lymphatic Drainage    Manual Lymphatic Drainage Comments  B inguinal, BLE's  -KD         Compression/Skin Care    Compression/Skin Care  -- Pt had already removed bandages and showered  -KD      Skin Care  moisturizing lotion applied Eucerin  -KD      Wrapping Location  lower extremity  -KD      Wrapping Location LE  bilateral:;foot to knee  -KD      Wrapping Comments  Tg9, artiflex, short stretch bandages 1-8cm, 3-10cm on right , 4-10cm on left.  -KD      Compression/Skin Care Comments  Started 2 bandages on feet.  -KD         User Key  (r) = Recorded By, (t) = Taken By, (c) = Cosigned By    Initials Name Provider Type    Ceci Esposito, MANDA Physical Therapist                        PT Assessment/Plan     Row Name 03/04/21 1035          PT Assessment    Assessment Comments  Legs are going down nicely. Pt had thought about ordering class 1 stockings, but was encouraged to order class 2 and to replace them every 6 months as well as don when first awakening & doff last thing at night. Also discussed applying moisturiser to legs/feet at night. Pt seemed to have good understanding of info.  -KD        PT Plan    PT Plan Comments  Cont therapy, pt to order new stockings.  -KD       User Key  (r) = Recorded By, (t) = Taken By, (c) = Cosigned By    Initials Name Provider Type    Ceci Esposito PT Physical Therapist             OP Exercises     Row Name 03/04/21 1039 03/04/21 1000          Subjective Comments    Subjective Comments  --  States his legs are really looking good.  -KD        Subjective Pain    Able to rate subjective pain?  --  yes  -KD     Pre-Treatment Pain Level  --  0  -KD     Post-Treatment Pain Level  --  0  -KD        Total Minutes    54628 - PT Manual Therapy Minutes  54  -KD  --       User Key  (r) = Recorded By, (t) = Taken By, (c) = Cosigned By    Initials Name Provider Type    Ceci Esposito PT Physical Therapist                     Manual Rx (last 36 hours)      Manual Treatments     Row Name 03/04/21 1039             Total Minutes    14631 - PT Manual Therapy Minutes  54  -KD        User Key  (r) = Recorded By, (t) = Taken By, (c) = Cosigned By    Initials Name Provider Type    Ceci Esposito PT Physical Therapist              Therapy Education  Education Details: Discussed proper amount of stocking compression (recommended 30-40mmHg) and stocking wear time/replacement.  Given: Edema management  Program: Reinforced  How Provided: Verbal  Provided to: Patient  Level of Understanding: Verbalized               Time Calculation:   Start Time: 0935  Stop Time: 1029  Time Calculation (min): 54 min  Total Timed Code Minutes- PT: 54 minute(s)   Therapy Charges for Today     Code Description Service Date Service Provider Modifiers Qty    81989530979 HC PT MANUAL THERAPY EA 15 MIN 3/4/2021 Ceci Ruby, PT GP 4                    Ceci Ruby, PT  3/4/2021

## 2021-03-05 ENCOUNTER — TREATMENT (OUTPATIENT)
Dept: PHYSICAL THERAPY | Facility: CLINIC | Age: 71
End: 2021-03-05

## 2021-03-05 ENCOUNTER — HOSPITAL ENCOUNTER (OUTPATIENT)
Dept: PHYSICAL THERAPY | Facility: HOSPITAL | Age: 71
Setting detail: THERAPIES SERIES
Discharge: HOME | End: 2021-03-05

## 2021-03-05 DIAGNOSIS — I89.0 LYMPHEDEMA: Primary | ICD-10-CM

## 2021-03-05 DIAGNOSIS — M25.662 KNEE JOINT STIFFNESS, BILATERAL: ICD-10-CM

## 2021-03-05 DIAGNOSIS — R26.9 GAIT DISTURBANCE: ICD-10-CM

## 2021-03-05 DIAGNOSIS — M25.661 KNEE JOINT STIFFNESS, BILATERAL: ICD-10-CM

## 2021-03-05 DIAGNOSIS — M25.651 HIP JOINT STIFFNESS, BILATERAL: ICD-10-CM

## 2021-03-05 DIAGNOSIS — M25.652 HIP JOINT STIFFNESS, BILATERAL: ICD-10-CM

## 2021-03-05 PROCEDURE — 97110 THERAPEUTIC EXERCISES: CPT | Performed by: PHYSICAL THERAPIST

## 2021-03-05 PROCEDURE — 97140 MANUAL THERAPY 1/> REGIONS: CPT

## 2021-03-05 NOTE — THERAPY TREATMENT NOTE
Outpatient Physical Therapy Lymphedema Progress Note  [facility]     Patient Name: Jose Hurtado  : 1950  MRN: [id number]  Today's Date: 3/5/2021        Visit Date: 2021    Visit Dx:    ICD-10-CM ICD-9-CM   1. Lymphedema  I89.0 457.1       Patient Active Problem List   Diagnosis   • Adenocarcinoma of esophagus (CMS/HCC)   • Adenomatous polyp of colon   • Malignant neoplasm of prostate (CMS/HCC)   • RLS (restless legs syndrome)   • Recurrent major depressive disorder, in partial remission (CMS/HCC)   • Hypertension   • Anemia   • Insomnia due to other mental disorder   • Peripheral polyneuropathy   • B12 deficiency   • Postsurgical malabsorption   • Lymphedema   • Iron deficiency   • Gastroparesis   • Acute deep vein thrombosis (DVT) of popliteal vein of left lower extremity (CMS/HCC)   • Tremor of both hands   • Gastrointestinal hemorrhage   • Acute blood loss anemia   • Pancytopenia (CMS/HCC)   • Chronic venous hypertension due to DVT   • COPD (chronic obstructive pulmonary disease) (CMS/HCC)   • Bleeding hemorrhoid   • Malabsorption of iron   • Iron deficiency anemia        Lymphedema     Row Name 21 1000             Subjective Pain    Able to rate subjective pain?  yes  -PC      Pre-Treatment Pain Level  0  -PC      Post-Treatment Pain Level  0  -PC         Subjective Comments    Subjective Comments  States he ordered new stockings.  -PC         Lymphedema Measurements    Measurement Type(s)  Circumferential  -PC      Circumferential Areas  Lower extremities  -PC         BLE Circumferential (cm)    Measurement Location 1  Knee  -PC      Left 1  42 cm  -PC      Right 1  39.8 cm  -PC      Measurement Location 2  10cm below knee  -PC      Left 2  40 cm  -PC      Right 2  37.4 cm  -PC      Measurement Location 3  20cm below knee  -PC      Left 3  38.2 cm  -PC      Right 3  36.7 cm  -PC      Measurement Location 4  30cm below knee  -PC      Left 4  32.5 cm  -PC      Right 4  31 cm  -PC       Measurement Location 5  Ankle  -PC      Left 5  27.5 cm  -PC      Right 5  25.5 cm  -PC      Measurement Location 6  Midfoot  -PC      Left 6  24 cm  -PC      Right 6  24.3 cm  -PC      LLE Circumferential Total  204.2 cm  -PC      RLE Circumferential Total  194.7 cm  -PC         Manual Lymphatic Drainage    Manual Lymphatic Drainage Comments  B inguinal, BLE's  -PC         Compression/Skin Care    Compression/Skin Care  -- Pt had already removed bandages and showered  -PC      Skin Care  moisturizing lotion applied Eucerin  -PC      Wrapping Location  lower extremity  -PC      Wrapping Location LE  bilateral:;foot to knee  -PC      Wrapping Comments  Tg9, artiflex, short stretch bandages 1-8cm, 3-10cm on right , 4-10cm on left.  -PC      Compression/Skin Care Comments  Started 2 bandages on feet.  -PC        User Key  (r) = Recorded By, (t) = Taken By, (c) = Cosigned By    Initials Name Provider Type    PC Gogo Mei, PT Physical Therapist                        PT Assessment/Plan     Row Name 03/05/21 1228          PT Assessment    Assessment Comments  Msmts have decreased by 5.8cm on right leg and 21.3cm on left. He has met STG #1 &2, partially met STG #2, and the rest are ongoing. Pt has ordered stockings.  -PC        PT Plan    PT Plan Comments  COnt per POC, at least 1 more week.  -PC       User Key  (r) = Recorded By, (t) = Taken By, (c) = Cosigned By    Initials Name Provider Type    PC Gogo Mei, PT Physical Therapist             OP Exercises     Row Name 03/05/21 1230 03/05/21 1000          Subjective Comments    Subjective Comments  --  States he ordered new stockings.  -PC        Subjective Pain    Able to rate subjective pain?  --  yes  -PC     Pre-Treatment Pain Level  --  0  -PC     Post-Treatment Pain Level  --  0  -PC        Total Minutes    26240 - PT Manual Therapy Minutes  55  -PC  --       User Key  (r) = Recorded By, (t) = Taken By, (c) = Cosigned By    Initials Name Provider Type     PC Gogo Mei, PT Physical Therapist                     Manual Rx (last 36 hours)      Manual Treatments     Row Name 03/05/21 1230             Total Minutes    25461 - PT Manual Therapy Minutes  55  -PC        User Key  (r) = Recorded By, (t) = Taken By, (c) = Cosigned By    Initials Name Provider Type    Gogo Carroll, PT Physical Therapist          PT OP Goals     Row Name 03/05/21 1200          PT Short Term Goals    STG Date to Achieve  03/08/21  -PC     STG 1  Pt demo awareness of condition and precautions for improved prevention, management, care of symptoms, and ease of transition to self-care of condition.  -PC     STG 1 Progress  Met  -PC     STG 2  Pt/family independent with self-wrapping techniques of compression bandages as indicated for improved self-management of condition.  -PC     STG 2 Progress  Ongoing  -PC     STG 2 Progress Comments  Pt has been keeping bandages on, but if he removes them he dons his old stockings.  -PC     STG 3  Pt demo decreased net edema of >/=5-10cm for decreased edema symptoms, decreased risk of infection, and improved skin care.  -PC     STG 3 Progress  Met  -PC     STG 3 Progress Comments  Msmts have decreased by 5.8cm on right and 21.3cm on left.  -PC        Long Term Goals    LTG Date to Achieve  03/22/21  -PC     LTG 1  Pt/family independent with self care techniques for self-management of condition.  -PC     LTG 1 Progress  Ongoing  -PC     LTG 2  Pt demo decreased net edema of >/=10-20cm for decreased edema symptoms, decreased risk of infection, and improved skin care.  -PC     LTG 2 Progress  Partially Met  -PC     LTG 2 Progress Comments  Right leg decreased by 21.3cm so far.  -PC     LTG 3  Pt/family independent with compression garments as indicated for self-management of condition.  -PC     LTG 3 Progress  Ongoing  -PC        Time Calculation    PT Goal Re-Cert Due Date  05/21/21  -PC       User Key  (r) = Recorded By, (t) = Taken By, (c) = Cosigned  By    Initials Name Provider Type    PC Gogo Mei, PT Physical Therapist          Therapy Education  Education Details: Discussed msmts and progress.  Given: Edema management, Symptoms/condition management  Program: Reinforced  How Provided: Verbal  Provided to: Patient  Level of Understanding: Verbalized              Time Calculation:   Start Time: 0934  Stop Time: 1029  Time Calculation (min): 55 min  Total Timed Code Minutes- PT: 55 minute(s)   Therapy Charges for Today     Code Description Service Date Service Provider Modifiers Qty    83590798478 HC PT MANUAL THERAPY EA 15 MIN 3/5/2021 Gogo Mei, PT GP 4                    Gogo Mei, PT  3/5/2021

## 2021-03-05 NOTE — PROGRESS NOTES
Physical Therapy Daily Progress Note    Patient: Jose Hurtado   : 1950  Diagnosis/ICD-10 Code:  Lymphedema [I89.0]  Referring practitioner: Brandin Bolton MD  Date of Initial Visit: Type: THERAPY  Noted: 3/1/2021  Today's Date: 3/5/2021  Patient seen for 2 sessions           Subjective Evaluation    History of Present Illness    Subjective comment: swelling improved. wrapped every day at lymph clinic. will have support hose next week and states he can don himself. not a member at [facility] where he does some private PT, but agrees that pool would be helpful. used to go to [employer] but not in 5 years.        Objective   See Exercise, Manual, and Modality Logs for complete treatment.       Assessment & Plan     Assessment  Assessment details: Had an iron infusion yesterday to help with energy. See chart for ED admit for SOA and rectal bleed back in Dec 2020. Still having pain R knee. Varus L knee is pronounced and did consult with Dr. Luna who might consider TKA once swelling goes down. Pt feels his swelling is better but is still extensive. Feel like small changes can be made with exercise but may need brace to control varus L knee if surgery can't be performed.                Timed:    Manual Therapy:         mins  62608;  Therapeutic Exercise:    45     mins  34060;     Neuromuscular Wil:        mins  32282;    Therapeutic Activity:          mins  38178;     Gait Training:           mins  00775;     Ultrasound:          mins  32164;    Electrical Stimulation:         mins  60684 ( );  Iontophoresis         mins 32380;  Aquatic Therapy         mins 02954;  Dry Needling                   mins    Untimed:  Electrical Stimulation:         mins  60515 ( );  Mechanical Traction:         mins  29932;     Timed Treatment:   45   mins   Total Treatment:     45   mins  Zorre Zeno Kimura, PT  Physical Therapist

## 2021-03-08 ENCOUNTER — HOSPITAL ENCOUNTER (OUTPATIENT)
Dept: PHYSICAL THERAPY | Facility: HOSPITAL | Age: 71
Setting detail: THERAPIES SERIES
Discharge: HOME | End: 2021-03-08

## 2021-03-08 DIAGNOSIS — I89.0 LYMPHEDEMA: Primary | ICD-10-CM

## 2021-03-08 PROCEDURE — 97140 MANUAL THERAPY 1/> REGIONS: CPT

## 2021-03-08 NOTE — THERAPY TREATMENT NOTE
Outpatient Physical Therapy Lymphedema Treatment Note  [facility]     Patient Name: Jose Hurtado  : 1950  MRN: [id number]  Today's Date: 3/8/2021        Visit Date: 2021    Visit Dx:    ICD-10-CM ICD-9-CM   1. Lymphedema  I89.0 457.1       Patient Active Problem List   Diagnosis   • Adenocarcinoma of esophagus (CMS/HCC)   • Adenomatous polyp of colon   • Malignant neoplasm of prostate (CMS/HCC)   • RLS (restless legs syndrome)   • Recurrent major depressive disorder, in partial remission (CMS/HCC)   • Hypertension   • Anemia   • Insomnia due to other mental disorder   • Peripheral polyneuropathy   • B12 deficiency   • Postsurgical malabsorption   • Lymphedema   • Iron deficiency   • Gastroparesis   • Acute deep vein thrombosis (DVT) of popliteal vein of left lower extremity (CMS/HCC)   • Tremor of both hands   • Gastrointestinal hemorrhage   • Acute blood loss anemia   • Pancytopenia (CMS/HCC)   • Chronic venous hypertension due to DVT   • COPD (chronic obstructive pulmonary disease) (CMS/HCC)   • Bleeding hemorrhoid   • Malabsorption of iron   • Iron deficiency anemia        Lymphedema     Row Name 21 1000             Subjective Pain    Able to rate subjective pain?  yes  -PC      Pre-Treatment Pain Level  0  -PC      Post-Treatment Pain Level  0  -PC         Subjective Comments    Subjective Comments  States even his left ankle looks smaller.  -PC         Manual Lymphatic Drainage    Manual Lymphatic Drainage Comments  B inguinal, BLE's  -PC         Compression/Skin Care    Compression/Skin Care  -- Pt had already removed bandages and showered  -PC      Skin Care  moisturizing lotion applied Eucerin  -PC      Wrapping Location  lower extremity  -PC      Wrapping Location LE  bilateral:;foot to knee  -PC      Wrapping Comments  Tg9, artiflex, short stretch bandages 1-8cm, 3-10cm on right , 4-10cm on left.  -PC      Compression/Skin Care Comments  Started 2 bandages on feet. Figure 8 with  first 3 bandages on left leg.  -PC        User Key  (r) = Recorded By, (t) = Taken By, (c) = Cosigned By    Initials Name Provider Type    Gogo Carroll PT Physical Therapist                        PT Assessment/Plan     Row Name 03/08/21 1020          PT Assessment    Assessment Comments  Left ankle appeared smaller today.  -PC        PT Plan    PT Plan Comments  Cont- fit with stockings when they arrive.  -PC       User Key  (r) = Recorded By, (t) = Taken By, (c) = Cosigned By    Initials Name Provider Type    Gogo Carroll PT Physical Therapist             OP Exercises     Row Name 03/08/21 1022 03/08/21 1000          Subjective Comments    Subjective Comments  --  States even his left ankle looks smaller.  -PC        Subjective Pain    Able to rate subjective pain?  --  yes  -PC     Pre-Treatment Pain Level  --  0  -PC     Post-Treatment Pain Level  --  0  -PC        Total Minutes    68203 - PT Manual Therapy Minutes  55  -PC  --       User Key  (r) = Recorded By, (t) = Taken By, (c) = Cosigned By    Initials Name Provider Type    Gogo Carroll PT Physical Therapist                     Manual Rx (last 36 hours)      Manual Treatments     Row Name 03/08/21 1022             Total Minutes    87386 - PT Manual Therapy Minutes  55  -PC        User Key  (r) = Recorded By, (t) = Taken By, (c) = Cosigned By    Initials Name Provider Type    Gogo Carroll PT Physical Therapist              Therapy Education  Given: Bandaging/dressing change  Program: Reinforced  How Provided: Verbal  Provided to: Patient  Level of Understanding: Verbalized              Time Calculation:   Start Time: 0930  Stop Time: 1025  Time Calculation (min): 55 min  Total Timed Code Minutes- PT: 55 minute(s)   Therapy Charges for Today     Code Description Service Date Service Provider Modifiers Qty    07917093541 HC PT MANUAL THERAPY EA 15 MIN 3/8/2021 Gogo Mei, PT GP 4                    Gogo Mei  PT  3/8/2021

## 2021-03-09 ENCOUNTER — HOSPITAL ENCOUNTER (OUTPATIENT)
Dept: PHYSICAL THERAPY | Facility: HOSPITAL | Age: 71
Setting detail: THERAPIES SERIES
Discharge: HOME | End: 2021-03-09

## 2021-03-09 DIAGNOSIS — I89.0 LYMPHEDEMA: Primary | ICD-10-CM

## 2021-03-09 PROCEDURE — 97140 MANUAL THERAPY 1/> REGIONS: CPT

## 2021-03-09 NOTE — THERAPY TREATMENT NOTE
Outpatient Physical Therapy Lymphedema Treatment Note  [facility]     Patient Name: Jose Hurtado  : 1950  MRN: [id number]  Today's Date: 3/9/2021        Visit Date: 2021    Visit Dx:    ICD-10-CM ICD-9-CM   1. Lymphedema  I89.0 457.1       Patient Active Problem List   Diagnosis   • Adenocarcinoma of esophagus (CMS/HCC)   • Adenomatous polyp of colon   • Malignant neoplasm of prostate (CMS/HCC)   • RLS (restless legs syndrome)   • Recurrent major depressive disorder, in partial remission (CMS/HCC)   • Hypertension   • Anemia   • Insomnia due to other mental disorder   • Peripheral polyneuropathy   • B12 deficiency   • Postsurgical malabsorption   • Lymphedema   • Iron deficiency   • Gastroparesis   • Acute deep vein thrombosis (DVT) of popliteal vein of left lower extremity (CMS/HCC)   • Tremor of both hands   • Gastrointestinal hemorrhage   • Acute blood loss anemia   • Pancytopenia (CMS/HCC)   • Chronic venous hypertension due to DVT   • COPD (chronic obstructive pulmonary disease) (CMS/[facility])   • Bleeding hemorrhoid   • Malabsorption of iron   • Iron deficiency anemia        Lymphedema     Row Name 21 1000             Subjective Pain    Able to rate subjective pain?  yes  -KD      Pre-Treatment Pain Level  0  -KD      Post-Treatment Pain Level  0  -KD         Subjective Comments    Subjective Comments  States he has ordered stockings, legs are much better.  -KD         Manual Lymphatic Drainage    Manual Lymphatic Drainage Comments  B inguinal, BLE's  -KD         Compression/Skin Care    Compression/Skin Care  -- Pt had already removed bandages and showered  -KD      Skin Care  moisturizing lotion applied Eucerin  -KD      Wrapping Location  lower extremity  -KD      Wrapping Location LE  bilateral:;foot to knee  -KD      Wrapping Comments  Tg9, artiflex, short stretch bandages 1-8cm, 3-10cm on right , 4-10cm on left.  -KD        User Key  (r) = Recorded By, (t) = Taken By, (c) =  Cosigned By    Initials Name Provider Type    Ceci Esposito, MANDA Physical Therapist                        PT Assessment/Plan     Row Name 03/09/21 1039          PT Assessment    Assessment Comments  Legs are much improved, pt is to bring stockings in to be fitted as soon as they arrive.  -KD        PT Plan    PT Plan Comments  Continue therapy treatment, fit with compression stockings once they are delivered.  -KD       User Key  (r) = Recorded By, (t) = Taken By, (c) = Cosigned By    Initials Name Provider Type    Ceci Esposito PT Physical Therapist             OP Exercises     Row Name 03/09/21 1040 03/09/21 1000          Subjective Comments    Subjective Comments  --  States he has ordered stockings, legs are much better.  -KD        Subjective Pain    Able to rate subjective pain?  --  yes  -KD     Pre-Treatment Pain Level  --  0  -KD     Post-Treatment Pain Level  --  0  -KD        Total Minutes    49894 - PT Manual Therapy Minutes  58  -KD  --       User Key  (r) = Recorded By, (t) = Taken By, (c) = Cosigned By    Initials Name Provider Type    Ceci Esposito PT Physical Therapist                     Manual Rx (last 36 hours)      Manual Treatments     Row Name 03/09/21 1040             Total Minutes    30038 - PT Manual Therapy Minutes  58  -KD        User Key  (r) = Recorded By, (t) = Taken By, (c) = Cosigned By    Initials Name Provider Type    Ceci Esposito PT Physical Therapist              Therapy Education  Education Details: Reminded pt to bring stockings in to be fitted as soon as they arrive.  Given: Edema management  Program: Reinforced  How Provided: Verbal  Provided to: Patient  Level of Understanding: Verbalized              Time Calculation:   Start Time: 0934  Stop Time: 1032  Time Calculation (min): 58 min  Total Timed Code Minutes- PT: 58 minute(s)   Therapy Charges for Today     Code Description Service Date Service Provider Modifiers Qty    27804570570  PT MANUAL  THERAPY EA 15 MIN 3/9/2021 Ceci Ruby, PT GP 4                    Ceci Ruby, PT  3/9/2021

## 2021-03-10 ENCOUNTER — HOSPITAL ENCOUNTER (OUTPATIENT)
Dept: PHYSICAL THERAPY | Facility: HOSPITAL | Age: 71
Setting detail: THERAPIES SERIES
Discharge: HOME | End: 2021-03-10

## 2021-03-10 DIAGNOSIS — I89.0 LYMPHEDEMA: Primary | ICD-10-CM

## 2021-03-10 PROCEDURE — 97140 MANUAL THERAPY 1/> REGIONS: CPT

## 2021-03-10 NOTE — THERAPY TREATMENT NOTE
Outpatient Physical Therapy Lymphedema Treatment Note  [facility]     Patient Name: Jose Hurtado  : 1950  MRN: [id number]  Today's Date: 3/10/2021        Visit Date: 03/10/2021    Visit Dx:    ICD-10-CM ICD-9-CM   1. Lymphedema  I89.0 457.1       Patient Active Problem List   Diagnosis   • Adenocarcinoma of esophagus (CMS/HCC)   • Adenomatous polyp of colon   • Malignant neoplasm of prostate (CMS/HCC)   • RLS (restless legs syndrome)   • Recurrent major depressive disorder, in partial remission (CMS/HCC)   • Hypertension   • Anemia   • Insomnia due to other mental disorder   • Peripheral polyneuropathy   • B12 deficiency   • Postsurgical malabsorption   • Lymphedema   • Iron deficiency   • Gastroparesis   • Acute deep vein thrombosis (DVT) of popliteal vein of left lower extremity (CMS/HCC)   • Tremor of both hands   • Gastrointestinal hemorrhage   • Acute blood loss anemia   • Pancytopenia (CMS/HCC)   • Chronic venous hypertension due to DVT   • COPD (chronic obstructive pulmonary disease) (CMS/[facility])   • Bleeding hemorrhoid   • Malabsorption of iron   • Iron deficiency anemia        Lymphedema     Row Name 03/10/21 1000             Subjective Pain    Able to rate subjective pain?  yes  -PC      Pre-Treatment Pain Level  0  -PC      Post-Treatment Pain Level  0  -PC         Subjective Comments    Subjective Comments  Nonew problems. Pleased with progress.  -PC         Manual Lymphatic Drainage    Manual Lymphatic Drainage Comments  B inguinal, BLE's  -PC         Compression/Skin Care    Compression/Skin Care  -- Pt had already removed bandages and showered  -PC      Skin Care  moisturizing lotion applied Eucerin  -PC      Wrapping Location  lower extremity  -PC      Wrapping Location LE  bilateral:;foot to knee  -PC      Wrapping Comments  Tg9, artiflex, short stretch bandages 1-8cm, 3-10cm on right , 4-10cm on left.  -PC        User Key  (r) = Recorded By, (t) = Taken By, (c) = Cosigned By     Initials Name Provider Type    PC Gogo Mei, PT Physical Therapist                        PT Assessment/Plan     Row Name 03/10/21 1033          PT Assessment    Assessment Comments  PLt is doing well, planning to d/c soon. May try stockings tmro.  -PC        PT Plan    PT Plan Comments  Cont- assess if ready to try stockings tmro.  -PC       User Key  (r) = Recorded By, (t) = Taken By, (c) = Cosigned By    Initials Name Provider Type    PC Gogo Mei, PT Physical Therapist             OP Exercises     Row Name 03/10/21 1034 03/10/21 1000          Subjective Comments    Subjective Comments  --  Nonew problems. Pleased with progress.  -PC        Subjective Pain    Able to rate subjective pain?  --  yes  -PC     Pre-Treatment Pain Level  --  0  -PC     Post-Treatment Pain Level  --  0  -PC        Total Minutes    80180 - PT Manual Therapy Minutes  54  -PC  --       User Key  (r) = Recorded By, (t) = Taken By, (c) = Cosigned By    Initials Name Provider Type    PC Gogo Mei, PT Physical Therapist                     Manual Rx (last 36 hours)      Manual Treatments     Row Name 03/10/21 1034             Total Minutes    52200 - PT Manual Therapy Minutes  54  -PC        User Key  (r) = Recorded By, (t) = Taken By, (c) = Cosigned By    Initials Name Provider Type    PC Gogo Mei, PT Physical Therapist              Therapy Education  Education Details: Reminded pt that stockings need to be replaced after 4-6 months of wear.  Given: Edema management  Program: Reinforced  How Provided: Verbal  Provided to: Patient  Level of Understanding: Verbalized              Time Calculation:   Start Time: 0933  Stop Time: 1027  Time Calculation (min): 54 min  Total Timed Code Minutes- PT: 54 minute(s)   Therapy Charges for Today     Code Description Service Date Service Provider Modifiers Qty    31605026771 HC PT MANUAL THERAPY EA 15 MIN 3/10/2021 Gogo Mei, PT GP 4                    Gogo Mei  PT  3/10/2021

## 2021-03-11 ENCOUNTER — TREATMENT (OUTPATIENT)
Dept: PHYSICAL THERAPY | Facility: CLINIC | Age: 71
End: 2021-03-11

## 2021-03-11 ENCOUNTER — HOSPITAL ENCOUNTER (OUTPATIENT)
Dept: PHYSICAL THERAPY | Facility: HOSPITAL | Age: 71
Setting detail: THERAPIES SERIES
Discharge: HOME | End: 2021-03-11

## 2021-03-11 DIAGNOSIS — I89.0 LYMPHEDEMA: Primary | ICD-10-CM

## 2021-03-11 DIAGNOSIS — M25.651 HIP JOINT STIFFNESS, BILATERAL: ICD-10-CM

## 2021-03-11 DIAGNOSIS — M25.661 KNEE JOINT STIFFNESS, BILATERAL: ICD-10-CM

## 2021-03-11 DIAGNOSIS — R26.9 GAIT DISTURBANCE: Primary | ICD-10-CM

## 2021-03-11 DIAGNOSIS — M25.662 KNEE JOINT STIFFNESS, BILATERAL: ICD-10-CM

## 2021-03-11 DIAGNOSIS — R29.898 WEAKNESS OF BOTH HIPS: ICD-10-CM

## 2021-03-11 DIAGNOSIS — M25.652 HIP JOINT STIFFNESS, BILATERAL: ICD-10-CM

## 2021-03-11 PROCEDURE — 97110 THERAPEUTIC EXERCISES: CPT | Performed by: PHYSICAL THERAPIST

## 2021-03-11 PROCEDURE — 97140 MANUAL THERAPY 1/> REGIONS: CPT

## 2021-03-11 PROCEDURE — 97140 MANUAL THERAPY 1/> REGIONS: CPT | Performed by: PHYSICAL THERAPIST

## 2021-03-11 PROCEDURE — 97535 SELF CARE MNGMENT TRAINING: CPT

## 2021-03-11 PROCEDURE — 97530 THERAPEUTIC ACTIVITIES: CPT | Performed by: PHYSICAL THERAPIST

## 2021-03-11 NOTE — PROGRESS NOTES
Physical Therapy Daily Progress Note  3 treatments  Subjective     Jose Hurtado reports: He is feeling well on arrival. Notes that he has another MD appointment later this afternoon. He has been consistent with his HEP.          Objective   See Exercise, Manual, and Modality Logs for complete treatment.       Assessment/Plan   Manual therapy interventions for postural ROM improvements.   Patient tolerated all treatment well and was able to tolerate progressions in therapeutic exercises with mild discomfort. Patient continues to need skilled therapy to improve gait tolerance, posture, and safety with ADL. Patient is progressing well at this time. Will continue to advance POC as tolerated.     Progress per Plan of Care and Progress strengthening /stabilization /functional activity           Manual Therapy:    18     mins  44901;  Therapeutic Exercise:    12     mins  88208;     Neuromuscular Wil:        mins  63772;    Therapeutic Activity:     15     mins  87050;     Gait Training:           mins  08595;     Ultrasound:          mins  34021;    Electrical Stimulation:         mins  38532 ( );  Dry Needling          mins self-pay    Timed Treatment:   45   mins   Total Treatment:     45   mins    Gonzalo Solis PT DPT  Physical Therapist  [place] License # [id number]

## 2021-03-11 NOTE — THERAPY TREATMENT NOTE
Outpatient Physical Therapy Lymphedema Treatment Note  [facility]     Patient Name: Jose Hurtado  : 1950  MRN: [id number]  Today's Date: 3/11/2021        Visit Date: 2021    Visit Dx:    ICD-10-CM ICD-9-CM   1. Lymphedema  I89.0 457.1       Patient Active Problem List   Diagnosis   • Adenocarcinoma of esophagus (CMS/HCC)   • Adenomatous polyp of colon   • Malignant neoplasm of prostate (CMS/HCC)   • RLS (restless legs syndrome)   • Recurrent major depressive disorder, in partial remission (CMS/HCC)   • Hypertension   • Anemia   • Insomnia due to other mental disorder   • Peripheral polyneuropathy   • B12 deficiency   • Postsurgical malabsorption   • Lymphedema   • Iron deficiency   • Gastroparesis   • Acute deep vein thrombosis (DVT) of popliteal vein of left lower extremity (CMS/HCC)   • Tremor of both hands   • Gastrointestinal hemorrhage   • Acute blood loss anemia   • Pancytopenia (CMS/HCC)   • Chronic venous hypertension due to DVT   • COPD (chronic obstructive pulmonary disease) (CMS/[facility])   • Bleeding hemorrhoid   • Malabsorption of iron   • Iron deficiency anemia        Lymphedema     Row Name 21 1000             Subjective Pain    Able to rate subjective pain?  yes  -KD      Pre-Treatment Pain Level  0  -KD      Post-Treatment Pain Level  0  -KD         Subjective Comments    Subjective Comments  Pleased with his legs.  -KD         Manual Lymphatic Drainage    Manual Lymphatic Drainage Comments  B inguinal, BLE's  -KD         Compression/Skin Care    Compression/Skin Care  -- Removed bandages  -KD      Skin Care  washed/dried Eucerin  -KD      Wrapping Location  lower extremity  -KD      Wrapping Location LE  bilateral:;foot to knee  -KD      Compression/Skin Care Comments  Pt applied his stockings independently. Good fit of stockings.  -KD        User Key  (r) = Recorded By, (t) = Taken By, (c) = Cosigned By    Initials Name Provider Type    Ceci Esposito, PT Physical  Therapist                        PT Assessment/Plan     Row Name 03/11/21 1030          PT Assessment    Assessment Comments  Good fit of stockings. Pt able to don independently with min difficulty. Discussed possible donning aid options.  -KD        PT Plan    PT Plan Comments  See pt tomorrow for re-check, discharge if there are no issues.  -KD       User Key  (r) = Recorded By, (t) = Taken By, (c) = Cosigned By    Initials Name Provider Type    Ceci Esposito, PT Physical Therapist             OP Exercises     Row Name 03/11/21 1033 03/11/21 1000          Subjective Comments    Subjective Comments  --  Pleased with his legs.  -KD        Subjective Pain    Able to rate subjective pain?  --  yes  -KD     Pre-Treatment Pain Level  --  0  -KD     Post-Treatment Pain Level  --  0  -KD        Total Minutes    86735 - PT Manual Therapy Minutes  31  -KD  --       User Key  (r) = Recorded By, (t) = Taken By, (c) = Cosigned By    Initials Name Provider Type    Ceci Esposito, PT Physical Therapist                     Manual Rx (last 36 hours)      Manual Treatments     Row Name 03/11/21 1033             Total Minutes    75296 - PT Manual Therapy Minutes  31  -KD        User Key  (r) = Recorded By, (t) = Taken By, (c) = Cosigned By    Initials Name Provider Type    Ceci Esposito, MANDA Physical Therapist              Therapy Education  Education Details: Reviewed stocking wear schedule; showed pt how to distribute fabric of stockings evenly using gloves. Discussed donning aid options such as gloves, Juzo Slippie Gator.  Given: Edema management  Program: Reinforced, New  How Provided: Verbal, Demonstration  Provided to: Patient  Level of Understanding: Teach back education performed, Demonstrated  65503 - PT Self Care/Mgmt Minutes: 15              Time Calculation:   Start Time: 0934  Stop Time: 1020  Time Calculation (min): 46 min  Total Timed Code Minutes- PT: 46 minute(s)   Therapy Charges for Today     Code  Description Service Date Service Provider Modifiers Qty    61977898346 HC PT MANUAL THERAPY EA 15 MIN 3/11/2021 Ceci Ruby, PT KX, GP 2    71076941880 HC PT SELF CARE/MGMT/TRAIN EA 15 MIN 3/11/2021 Ceci Ruby, PT KX, GP 1                    Ceci Ruby, PT  3/11/2021

## 2021-03-12 ENCOUNTER — HOSPITAL ENCOUNTER (OUTPATIENT)
Dept: PHYSICAL THERAPY | Facility: HOSPITAL | Age: 71
Setting detail: THERAPIES SERIES
Discharge: HOME | End: 2021-03-12

## 2021-03-12 DIAGNOSIS — I89.0 LYMPHEDEMA: Primary | ICD-10-CM

## 2021-03-12 PROCEDURE — 97535 SELF CARE MNGMENT TRAINING: CPT

## 2021-03-12 NOTE — THERAPY DISCHARGE NOTE
Outpatient Physical Therapy Lymphedema Progress Note/Discharge Summary  [facility]     Patient Name: Jose Hurtado  : 1950  MRN: [id number]  Today's Date: 3/12/2021      Visit Date: 2021    Visit Dx:    ICD-10-CM ICD-9-CM   1. Lymphedema  I89.0 457.1       Patient Active Problem List   Diagnosis   • Adenocarcinoma of esophagus (CMS/HCC)   • Adenomatous polyp of colon   • Malignant neoplasm of prostate (CMS/HCC)   • RLS (restless legs syndrome)   • Recurrent major depressive disorder, in partial remission (CMS/HCC)   • Hypertension   • Anemia   • Insomnia due to other mental disorder   • Peripheral polyneuropathy   • B12 deficiency   • Postsurgical malabsorption   • Lymphedema   • Iron deficiency   • Gastroparesis   • Acute deep vein thrombosis (DVT) of popliteal vein of left lower extremity (CMS/HCC)   • Tremor of both hands   • Gastrointestinal hemorrhage   • Acute blood loss anemia   • Pancytopenia (CMS/HCC)   • Chronic venous hypertension due to DVT   • COPD (chronic obstructive pulmonary disease) (CMS/HCC)   • Bleeding hemorrhoid   • Malabsorption of iron   • Iron deficiency anemia        Lymphedema     Row Name 21 0900             Subjective Pain    Able to rate subjective pain?  yes  -PC      Pre-Treatment Pain Level  0  -PC      Post-Treatment Pain Level  0  -PC         Subjective Comments    Subjective Comments  States he did fine with the stockings. Since his legs are smaller they are easier to put on.   -PC         Lymphedema Edema Assessment    Edema Assessment Comment  Legs appear smaller with good contours.  -PC         Skin Changes/Observations    Skin Observations Comment  Min dry.  -PC         Lymphedema Measurements    Measurement Type(s)  Circumferential  -PC      Circumferential Areas  Lower extremities  -PC         BLE Circumferential (cm)    Measurement Location 1  Knee  -PC      Left 1  41 cm  -PC      Right 1  39 cm  -PC      Measurement Location 2  10cm below knee   -PC      Left 2  39 cm  -PC      Right 2  37 cm  -PC      Measurement Location 3  20cm below knee  -PC      Left 3  38.5 cm  -PC      Right 3  37 cm  -PC      Measurement Location 4  30cm below knee  -PC      Left 4  32.5 cm  -PC      Right 4  29 cm  -PC      Measurement Location 5  Ankle  -PC      Left 5  27.1 cm  -PC      Right 5  25 cm  -PC      Measurement Location 6  Midfoot  -PC      Left 6  25 cm  -PC      Right 6  24.5 cm  -PC      LLE Circumferential Total  203.1 cm  -PC      RLE Circumferential Total  191.5 cm  -PC         Compression/Skin Care    Compression/Skin Care Comments  Removed stockings, measured legs. Pt re-applied stockings.  -PC        User Key  (r) = Recorded By, (t) = Taken By, (c) = Cosigned By    Initials Name Provider Type    PC Gogo Mei, PT Physical Therapist                        PT Assessment/Plan     Row Name 03/12/21 0959          PT Assessment    Assessment Comments  Pt is ready for d/c. All goals were met except for self-wrapping. His msmts decreased by 9cm on right and 22.4cm on left. He is  independent with self-care including don/doff stockings.  -PC        PT Plan    PT Plan Comments  D/C.  -PC       User Key  (r) = Recorded By, (t) = Taken By, (c) = Cosigned By    Initials Name Provider Type    PC Gogo Mei, PT Physical Therapist               OP Exercises     Row Name 03/12/21 0900             Subjective Comments    Subjective Comments  States he did fine with the stockings. Since his legs are smaller they are easier to put on.   -PC         Subjective Pain    Able to rate subjective pain?  yes  -PC      Pre-Treatment Pain Level  0  -PC      Post-Treatment Pain Level  0  -PC        User Key  (r) = Recorded By, (t) = Taken By, (c) = Cosigned By    Initials Name Provider Type    Gogo Carroll, PT Physical Therapist                        PT OP Goals     Row Name 03/12/21 0900          PT Short Term Goals    STG Date to Achieve  03/08/21  -PC     STG 1  Pt  demo awareness of condition and precautions for improved prevention, management, care of symptoms, and ease of transition to self-care of condition.  -PC     STG 1 Progress  Met  -PC     STG 2  Pt/family independent with self-wrapping techniques of compression bandages as indicated for improved self-management of condition.  -PC     STG 2 Progress  Not Met  -PC     STG 2 Progress Comments  Pt did not attempt to wrap his own legs because he had stockings he could wear once he removed the bandages.  -PC     STG 3  Pt demo decreased net edema of >/=5-10cm for decreased edema symptoms, decreased risk of infection, and improved skin care.  -PC     STG 3 Progress  Met  -PC     STG 3 Progress Comments  Msmts decreased by 9cm on right and 22.4cm on left.  -        Long Term Goals    LTG Date to Achieve  03/22/21  -PC     LTG 1  Pt/family independent with self care techniques for self-management of condition.  -PC     LTG 1 Progress  Met  -     LTG 2  Pt demo decreased net edema of >/=10-20cm for decreased edema symptoms, decreased risk of infection, and improved skin care.  -PC     LTG 2 Progress  Partially Met  -PC     LTG 3  Pt/family independent with compression garments as indicated for self-management of condition.  -PC     LTG 3 Progress  Met  -PC        Time Calculation    PT Goal Re-Cert Due Date  05/21/21  -PC       User Key  (r) = Recorded By, (t) = Taken By, (c) = Cosigned By    Initials Name Provider Type    Gogo Carroll PT Physical Therapist          Therapy Education  Education Details: Reminded pt to wear stockings every day, don first thing in am and remove last thing at night and apply lotion. Advised pt to cont with exercise (he plans to start water exercise).  Given: Edema management, Symptoms/condition management  Program: Reinforced  How Provided: Verbal  Provided to: Patient  Level of Understanding: Verbalized  44905 - PT Self Care/Mgmt Minutes: 25              Time Calculation:   Start  Time: 0930  Stop Time: 0955  Time Calculation (min): 25 min  Total Timed Code Minutes- PT: 25 minute(s)     Therapy Charges for Today     Code Description Service Date Service Provider Modifiers Qty    81195086486 HC PT SELF CARE/MGMT/TRAIN EA 15 MIN 3/12/2021 Gogo Mei, PT GP, KX 2                 OP PT Discharge Summary  Date of Discharge: 03/12/21  Reason for Discharge: Maximum functional potential achieved  Outcomes Achieved: Patient able to partially acheive established goals  Discharge Destination: Home with home program  Discharge Instructions/Additional Comments: Wear stockings during day, remove at night. Cont with skin care and exercise.      Gogo Mei, PT  3/12/2021

## 2021-03-15 ENCOUNTER — APPOINTMENT (OUTPATIENT)
Dept: PHYSICAL THERAPY | Facility: HOSPITAL | Age: 71
End: 2021-03-15

## 2021-03-16 ENCOUNTER — APPOINTMENT (OUTPATIENT)
Dept: PHYSICAL THERAPY | Facility: HOSPITAL | Age: 71
End: 2021-03-16

## 2021-03-17 ENCOUNTER — HOSPITAL ENCOUNTER (OUTPATIENT)
Dept: CT IMAGING | Facility: HOSPITAL | Age: 71
Discharge: HOME | End: 2021-03-17
Admitting: THORACIC SURGERY (CARDIOTHORACIC VASCULAR SURGERY)

## 2021-03-17 ENCOUNTER — APPOINTMENT (OUTPATIENT)
Dept: PHYSICAL THERAPY | Facility: HOSPITAL | Age: 71
End: 2021-03-17

## 2021-03-17 DIAGNOSIS — C15.9 ADENOCARCINOMA OF ESOPHAGUS (HCC): ICD-10-CM

## 2021-03-17 LAB — CREAT BLDA-MCNC: 1.3 MG/DL (ref 0.6–1.3)

## 2021-03-17 PROCEDURE — 25010000002 IOPAMIDOL 61 % SOLUTION: Performed by: THORACIC SURGERY (CARDIOTHORACIC VASCULAR SURGERY)

## 2021-03-17 PROCEDURE — 74177 CT ABD & PELVIS W/CONTRAST: CPT

## 2021-03-17 PROCEDURE — 71260 CT THORAX DX C+: CPT

## 2021-03-17 PROCEDURE — 82565 ASSAY OF CREATININE: CPT

## 2021-03-17 RX ADMIN — IOPAMIDOL 100 ML: 612 INJECTION, SOLUTION INTRAVENOUS at 11:54

## 2021-03-18 ENCOUNTER — TREATMENT (OUTPATIENT)
Dept: PHYSICAL THERAPY | Facility: CLINIC | Age: 71
End: 2021-03-18

## 2021-03-18 ENCOUNTER — APPOINTMENT (OUTPATIENT)
Dept: PHYSICAL THERAPY | Facility: HOSPITAL | Age: 71
End: 2021-03-18

## 2021-03-18 DIAGNOSIS — M25.661 KNEE JOINT STIFFNESS, BILATERAL: ICD-10-CM

## 2021-03-18 DIAGNOSIS — R26.9 GAIT DISTURBANCE: Primary | ICD-10-CM

## 2021-03-18 DIAGNOSIS — M25.652 HIP JOINT STIFFNESS, BILATERAL: ICD-10-CM

## 2021-03-18 DIAGNOSIS — M25.662 KNEE JOINT STIFFNESS, BILATERAL: ICD-10-CM

## 2021-03-18 DIAGNOSIS — M25.651 HIP JOINT STIFFNESS, BILATERAL: ICD-10-CM

## 2021-03-18 PROCEDURE — 97140 MANUAL THERAPY 1/> REGIONS: CPT | Performed by: PHYSICAL THERAPIST

## 2021-03-18 PROCEDURE — 97110 THERAPEUTIC EXERCISES: CPT | Performed by: PHYSICAL THERAPIST

## 2021-03-18 PROCEDURE — 97530 THERAPEUTIC ACTIVITIES: CPT | Performed by: PHYSICAL THERAPIST

## 2021-03-18 NOTE — PROGRESS NOTES
Physical Therapy Daily Progress Note  4 treatments  Subjective     Jose Hurtado reports: feeling well on arrival. Notes that he still feels stiff and that he cannot walk upright.         Objective   See Exercise, Manual, and Modality Logs for complete treatment.       Assessment/Plan  Continuing to work with patient on manual therapy for stretching and ROM followed with exercise and therapeutic activities to use muscles/joints in new ROM. Tolerated all treatment well on this date.   Progress per Plan of Care and Progress strengthening /stabilization /functional activity           Manual Therapy:    15     mins  31060;  Therapeutic Exercise:    15     mins  93923;     Neuromuscular Wil:        mins  84206;    Therapeutic Activity:     12     mins  65876;     Gait Training:           mins  80617;     Ultrasound:          mins  83809;    Electrical Stimulation:         mins  30166 ( );  Dry Needling          mins self-pay    Timed Treatment:   42   mins   Total Treatment:     42   mins    Gonzalo Solis PT DPT  Physical Therapist  [place] License # [id number]

## 2021-03-19 ENCOUNTER — APPOINTMENT (OUTPATIENT)
Dept: PHYSICAL THERAPY | Facility: HOSPITAL | Age: 71
End: 2021-03-19

## 2021-03-22 ENCOUNTER — TELEPHONE (OUTPATIENT)
Dept: ONCOLOGY | Facility: CLINIC | Age: 71
End: 2021-03-22

## 2021-03-22 ENCOUNTER — APPOINTMENT (OUTPATIENT)
Dept: PHYSICAL THERAPY | Facility: HOSPITAL | Age: 71
End: 2021-03-22

## 2021-03-22 RX ORDER — APIXABAN 5 MG/1
TABLET, FILM COATED ORAL
Qty: 180 TABLET | Refills: 1 | Status: SHIPPED | OUTPATIENT
Start: 2021-03-22 | End: 2022-01-18

## 2021-03-22 NOTE — TELEPHONE ENCOUNTER
Caller: JJ    Relationship to patient:     Best call back number: [phone]    Chief complaint: R/S LAB AND RN REVIEW FOR 11AM     Type of visit:LAB AND REVIEW    Requested date: SAME DAY HOUR LATER    If rescheduling, when is the original appointment: 3/23    Additional notes:

## 2021-03-23 ENCOUNTER — TREATMENT (OUTPATIENT)
Dept: PHYSICAL THERAPY | Facility: CLINIC | Age: 71
End: 2021-03-23

## 2021-03-23 ENCOUNTER — APPOINTMENT (OUTPATIENT)
Dept: PHYSICAL THERAPY | Facility: HOSPITAL | Age: 71
End: 2021-03-23

## 2021-03-23 ENCOUNTER — APPOINTMENT (OUTPATIENT)
Dept: ONCOLOGY | Facility: HOSPITAL | Age: 71
End: 2021-03-23

## 2021-03-23 ENCOUNTER — CLINICAL SUPPORT (OUTPATIENT)
Dept: ONCOLOGY | Facility: HOSPITAL | Age: 71
End: 2021-03-23

## 2021-03-23 ENCOUNTER — LAB (OUTPATIENT)
Dept: OTHER | Facility: HOSPITAL | Age: 71
End: 2021-03-23

## 2021-03-23 ENCOUNTER — APPOINTMENT (OUTPATIENT)
Dept: OTHER | Facility: HOSPITAL | Age: 71
End: 2021-03-23

## 2021-03-23 ENCOUNTER — OFFICE VISIT (OUTPATIENT)
Dept: SURGERY | Facility: CLINIC | Age: 71
End: 2021-03-23

## 2021-03-23 VITALS
BODY MASS INDEX: 25.62 KG/M2 | HEART RATE: 60 BPM | WEIGHT: 217 LBS | SYSTOLIC BLOOD PRESSURE: 153 MMHG | HEIGHT: 77 IN | OXYGEN SATURATION: 97 % | DIASTOLIC BLOOD PRESSURE: 72 MMHG

## 2021-03-23 DIAGNOSIS — M25.652 HIP JOINT STIFFNESS, BILATERAL: ICD-10-CM

## 2021-03-23 DIAGNOSIS — R29.898 WEAKNESS OF BOTH LEGS: ICD-10-CM

## 2021-03-23 DIAGNOSIS — R26.9 GAIT DISTURBANCE: Primary | ICD-10-CM

## 2021-03-23 DIAGNOSIS — G89.29 CHRONIC BILATERAL LOW BACK PAIN WITHOUT SCIATICA: ICD-10-CM

## 2021-03-23 DIAGNOSIS — M25.661 KNEE JOINT STIFFNESS, BILATERAL: ICD-10-CM

## 2021-03-23 DIAGNOSIS — R29.898 WEAKNESS OF BOTH HIPS: ICD-10-CM

## 2021-03-23 DIAGNOSIS — I89.0 LYMPHEDEMA: ICD-10-CM

## 2021-03-23 DIAGNOSIS — M25.662 KNEE JOINT STIFFNESS, BILATERAL: ICD-10-CM

## 2021-03-23 DIAGNOSIS — C15.9 ADENOCARCINOMA OF ESOPHAGUS (HCC): Primary | ICD-10-CM

## 2021-03-23 DIAGNOSIS — M54.50 CHRONIC BILATERAL LOW BACK PAIN WITHOUT SCIATICA: ICD-10-CM

## 2021-03-23 DIAGNOSIS — D50.9 IRON DEFICIENCY ANEMIA, UNSPECIFIED IRON DEFICIENCY ANEMIA TYPE: ICD-10-CM

## 2021-03-23 DIAGNOSIS — M25.651 HIP JOINT STIFFNESS, BILATERAL: ICD-10-CM

## 2021-03-23 DIAGNOSIS — Z48.3 AFTERCARE FOLLOWING SURGERY FOR NEOPLASM: ICD-10-CM

## 2021-03-23 LAB
BASOPHILS # BLD AUTO: 0.03 10*3/MM3 (ref 0–0.2)
BASOPHILS NFR BLD AUTO: 0.9 % (ref 0–1.5)
DEPRECATED RDW RBC AUTO: 50.8 FL (ref 37–54)
EOSINOPHIL # BLD AUTO: 0.22 10*3/MM3 (ref 0–0.4)
EOSINOPHIL NFR BLD AUTO: 6.5 % (ref 0.3–6.2)
ERYTHROCYTE [DISTWIDTH] IN BLOOD BY AUTOMATED COUNT: 15 % (ref 12.3–15.4)
FERRITIN SERPL-MCNC: 470.6 NG/ML (ref 30–400)
HCT VFR BLD AUTO: 34.1 % (ref 37.5–51)
HGB BLD-MCNC: 10.8 G/DL (ref 13–17.7)
HGB RETIC QN AUTO: 33.9 PG (ref 29.8–36.1)
IMM GRANULOCYTES # BLD AUTO: 0.01 10*3/MM3 (ref 0–0.05)
IMM GRANULOCYTES NFR BLD AUTO: 0.3 % (ref 0–0.5)
IMM RETICS NFR: 9.9 % (ref 3–15.8)
IRON 24H UR-MRATE: 58 MCG/DL (ref 59–158)
IRON SATN MFR SERPL: 20 % (ref 20–50)
LYMPHOCYTES # BLD AUTO: 1.2 10*3/MM3 (ref 0.7–3.1)
LYMPHOCYTES NFR BLD AUTO: 35.5 % (ref 19.6–45.3)
MCH RBC QN AUTO: 29.2 PG (ref 26.6–33)
MCHC RBC AUTO-ENTMCNC: 31.7 G/DL (ref 31.5–35.7)
MCV RBC AUTO: 92.2 FL (ref 79–97)
MONOCYTES # BLD AUTO: 0.33 10*3/MM3 (ref 0.1–0.9)
MONOCYTES NFR BLD AUTO: 9.8 % (ref 5–12)
NEUTROPHILS NFR BLD AUTO: 1.59 10*3/MM3 (ref 1.7–7)
NEUTROPHILS NFR BLD AUTO: 47 % (ref 42.7–76)
NRBC BLD AUTO-RTO: 0 /100 WBC (ref 0–0.2)
PLATELET # BLD AUTO: 124 10*3/MM3 (ref 140–450)
PMV BLD AUTO: 8.9 FL (ref 6–12)
RBC # BLD AUTO: 3.7 10*6/MM3 (ref 4.14–5.8)
RETICS # AUTO: 0.03 10*6/MM3 (ref 0.02–0.13)
RETICS/RBC NFR AUTO: 0.88 % (ref 0.7–1.9)
TIBC SERPL-MCNC: 285 MCG/DL (ref 298–536)
TRANSFERRIN SERPL-MCNC: 191 MG/DL (ref 200–360)
WBC # BLD AUTO: 3.38 10*3/MM3 (ref 3.4–10.8)

## 2021-03-23 PROCEDURE — 97140 MANUAL THERAPY 1/> REGIONS: CPT | Performed by: PHYSICAL THERAPIST

## 2021-03-23 PROCEDURE — 83540 ASSAY OF IRON: CPT | Performed by: INTERNAL MEDICINE

## 2021-03-23 PROCEDURE — 99213 OFFICE O/P EST LOW 20 MIN: CPT | Performed by: THORACIC SURGERY (CARDIOTHORACIC VASCULAR SURGERY)

## 2021-03-23 PROCEDURE — 97530 THERAPEUTIC ACTIVITIES: CPT | Performed by: PHYSICAL THERAPIST

## 2021-03-23 PROCEDURE — 85046 RETICYTE/HGB CONCENTRATE: CPT | Performed by: INTERNAL MEDICINE

## 2021-03-23 PROCEDURE — 97110 THERAPEUTIC EXERCISES: CPT | Performed by: PHYSICAL THERAPIST

## 2021-03-23 PROCEDURE — G0463 HOSPITAL OUTPT CLINIC VISIT: HCPCS

## 2021-03-23 PROCEDURE — 84466 ASSAY OF TRANSFERRIN: CPT | Performed by: INTERNAL MEDICINE

## 2021-03-23 PROCEDURE — 36415 COLL VENOUS BLD VENIPUNCTURE: CPT

## 2021-03-23 PROCEDURE — 82728 ASSAY OF FERRITIN: CPT | Performed by: INTERNAL MEDICINE

## 2021-03-23 PROCEDURE — 85025 COMPLETE CBC W/AUTO DIFF WBC: CPT | Performed by: INTERNAL MEDICINE

## 2021-03-23 RX ORDER — FERROUS SULFATE 325(65) MG
325 TABLET ORAL 2 TIMES DAILY WITH MEALS
Qty: 30 TABLET | Refills: 11 | OUTPATIENT
Start: 2021-03-23

## 2021-03-23 NOTE — PROGRESS NOTES
Physical Therapy Daily Progress Note    Patient: Jose Hurtado   : 1950  Diagnosis/ICD-10 Code:  Gait disturbance [R26.9]  Referring practitioner: Brandin Bolton MD  Date of Initial Visit: Type: THERAPY  Noted: 3/1/2021  Today's Date: 3/23/2021  Patient seen for 5 sessions         Jose Hurtado reports: no new complaints. Feeling like he may be able to stand more upright.    Subjective     Objective   See Exercise, Manual, and Modality Logs for complete treatment.       Assessment/Plan  Subjectively, pt reports no increase of pain or discomfort with interventions performed today. Performed well with thoracic extension exercises with good tolerance to manual overpressure into more extension. Continues to benefit from verbal/tactile cues to ensure proper form and technique for exercise performance especially with postural cues for upright posture when ambulating.     Progress per Plan of Care           Manual Therapy:    10     mins  00223;  Therapeutic Exercise:    26     mins  43462;     Neuromuscular Wil:        mins  64069;    Therapeutic Activity:     10     mins  57806;     Gait Training:           mins  47466;     Ultrasound:          mins  15335;    Electrical Stimulation:         mins  07273 ( );  Dry Needling          mins self-pay    Timed Treatment:   46   mins   Total Treatment:     46   mins    Pattie Garnica PTA  Physical Therapist Assistant [phone]

## 2021-03-23 NOTE — PROGRESS NOTES
Subjective   Patient ID: Jose Hurtado is a 71 y.o. male is here today for follow-up.    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  Patient received neoadjuvant radiation chemotherapy and following surgery the tumor was staged as ypT0 N0 M0.  The patient has been doing well.  He reports no difficulty swallowing.  He eats whatever he wants to eat.  He has had no nausea or vomiting.  He has had no hematemesis.  He has had no blood in his bowel movements.  He has had no unexplained weight loss.  He reports quite a bit of reflux.    The following portions of the patient's history were reviewed and updated as appropriate: allergies, current medications, past family history, past medical history, past social history, past surgical history and problem list.  Review of Systems   Constitutional: Negative.   HENT: Negative.    Eyes: Negative.    Cardiovascular: Negative.    Respiratory: Negative.    Endocrine: Negative.    Hematologic/Lymphatic: Negative.    Skin: Negative.    Musculoskeletal: Negative.    Gastrointestinal: Negative.    Genitourinary: Negative.    Neurological: Negative.    Psychiatric/Behavioral: Negative.    Allergic/Immunologic: Negative.      Patient Active Problem List   Diagnosis   • Adenocarcinoma of esophagus (CMS/HCC)   • Adenomatous polyp of colon   • Malignant neoplasm of prostate (CMS/HCC)   • RLS (restless legs syndrome)   • Recurrent major depressive disorder, in partial remission (CMS/HCC)   • Hypertension   • Anemia   • Insomnia due to other mental disorder   • Peripheral polyneuropathy   • B12 deficiency   • Postsurgical malabsorption   • Lymphedema   • Iron deficiency   • Gastroparesis   • Acute deep vein thrombosis (DVT) of popliteal vein of left lower extremity (CMS/HCC)   • Tremor of both  hands   • Gastrointestinal hemorrhage   • Acute blood loss anemia   • Pancytopenia (CMS/HCC)   • Chronic venous hypertension due to DVT   • COPD (chronic obstructive pulmonary disease) (CMS/HCC)   • Bleeding hemorrhoid   • Malabsorption of iron   • Iron deficiency anemia     Past Medical History:   Diagnosis Date   • Acute deep vein thrombosis (DVT) of popliteal vein of left lower extremity (CMS/HCC) 03/24/2020   • Anemia    • Anti-phospholipid syndrome (CMS/HCC)     On lifelong anticoagulation therapy   • Bladder disorder     LEAKAGE   ON MED  wers pads   • Bleeding hemorrhoids    • Community acquired pneumonia     HISTORY OF IN 2014   • Deep venous thrombosis (CMS/HCC) 2006, 2008    Left lower extremity multiple   • Depression    • Esophageal carcinoma (CMS/HCC) 12/31/2014    had chemo and radiation prior surgery   • Hemorrhoids    • HH (hiatus hernia)    • History of atrial fibrillation 2015    ONE EPISODE WHILE HOSPITALIZED   • History of kidney stones    • History of nephrolithiasis    • History of pancreatitis     PT STATES MANY YEARS AGO   • History of radiation therapy    • History of transfusion    • Hypertension    • Long-term (current) use of anticoagulants, INR goal 2.0-3.0    • Lymphedema    • Malignant neoplasm of prostate (CMS/HCC)    • Other hyperlipidemia 1/30/2018 January 30, 2018 lipid panel risk 12.8%   • Restless legs syndrome    • Sleep apnea     OCCASIONALLY WEARS CPAP   • Squamous carcinoma     on the head     Past Surgical History:   Procedure Laterality Date   • APPENDECTOMY  1950   • BRONCHOSCOPY      (Diagnostic)   • CATARACT EXTRACTION Bilateral 2014   • COLONOSCOPY  12/15/2014    Complete / Description: EH, IH, torts, stool, follow-up colonoscopy due in 5 years.   • COLONOSCOPY N/A 6/13/2017    non-thrombosed external hemorrhoids, normal examined ileum, IH   • COLONOSCOPY N/A 2/26/2019    Procedure: COLONOSCOPY with Cold Polypectomy;  Surgeon: Mulugeta Millan MD;  Location:   [facility];  Service: Gastroenterology   • COLONOSCOPY N/A 12/16/2020    Procedure: COLONOSCOPY to cecum into TI;  Surgeon: Mesha Sanchez MD;  Location:  [facility];  Service: Gastroenterology;  Laterality: N/A;  pre: lower GI bleed  post: hemorrhoids    • CYSTOSCOPY W/ LASER LITHOTRIPSY     • ENDOSCOPY N/A 6/13/2017    Procedure: ESOPHAGOGASTRODUODENOSCOPY WITH COLD BIOPSY;  Surgeon: Lake Gonzalez MD;  [facility];  Service:    • ESOPHAGECTOMY      April 2015, stage IIB esophageal carcinoma, sub-total resection.   • ESOPHAGECTOMY      Esophagectomy Subtotal Andrea Joe Procedure   • EXCISION LESION  08/2012    Removal of Squamous Cell CA on Head   • HAMMER TOE REPAIR  09/2014    Hammertoe Operation (Each Toe), 10/2014   • HAMMER TOE REPAIR Left 10/3/2017    Procedure: Left second third and fourth distal interphalangeal joint resection with flexor tenotomy;  Surgeon: uMlugeta Lira MD;  Location:  [facility];  Service:    • HEMORRHOIDECTOMY N/A 12/23/2020    Procedure: HEMORRHOIDECTOMY;  Surgeon: Billy Julio Jr., MD;  Location: [facility] OR;  Service: General;  Laterality: N/A;   • HERNIA REPAIR      incisional   • JEJUNOSTOMY      Laparoscopic   • JEJUNOSTOMY      tube removal    • KNEE SURGERY Bilateral 1967, 1973, 1981   • PATELLA SURGERY Left     removed   • PILONIDAL CYST / SINUS EXCISION     • IL TOTAL KNEE ARTHROPLASTY Right 3/26/2018    Procedure: TOTAL KNEE ARTHROPLASTY;  Surgeon: Renny Solis MD;  Location: [facility] MA[facility] OR;  Service: Orthopedics   • PROSTATECTOMY  2010   • PYLOROPLASTY     • SPINAL FUSION  02/1998    C 5,6   • TONSILLECTOMY     • UPPER GASTROINTESTINAL ENDOSCOPY  12/15/2014    LA Grade D esophagitis, Pardo's, HH, multiple duodenal ulcers   • VENTRAL/INCISIONAL HERNIA REPAIR N/A 4/14/2016    Procedure: VENTRAL/INCISIONAL HERNIA REPAIR, open, with mesh, and component separation;  Surgeon: Darren Rivas MD;  Location: [facility]  "OR;  Service:      Family History   Problem Relation Age of Onset   • Cerebral aneurysm Mother         cerebral artery aneurysm ( age 56)   • Prostate cancer Brother 68   • Anxiety disorder Father    • Suicide Attempts Father          of suicide   • Cancer Father         bladder   • No Known Problems Brother    • Pancreatic cancer Nephew    • Colon cancer Neg Hx    • Esophageal cancer Neg Hx    • Dementia Neg Hx    • Malig Hyperthermia Neg Hx      Social History     Socioeconomic History   • Marital status:      Spouse name: Lena   • Number of children: 0   • Years of education: College   • Highest education level: Not on file   Tobacco Use   • Smoking status: Former Smoker     Packs/day: 2.00     Years: 3.00     Pack years: 6.00     Types: Cigarettes     Quit date:      Years since quittin.2   • Smokeless tobacco: Former User     Types: Chew   • Tobacco comment: no caffeine    Vaping Use   • Vaping Use: Never used   Substance and Sexual Activity   • Alcohol use: Yes     Alcohol/week: 3.0 standard drinks     Types: 1 Glasses of wine, 1 Cans of beer, 1 Shots of liquor per week   • Drug use: Not Currently     Types: Marijuana     Comment: hx marijuana use \"a long time ago\"   • Sexual activity: Defer       Current Outpatient Medications:   •  albuterol sulfate  (90 Base) MCG/ACT inhaler, Inhale 1 puff Every 4 (Four) Hours As Needed for Wheezing., Disp: , Rfl:   •  apixaban (ELIQUIS) 2.5 MG tablet tablet, Take 2.5 mg by mouth 2 (Two) Times a Day., Disp: , Rfl:   •  docusate sodium 100 MG capsule, Take 1 capsule by mouth 2 (Two) Times a Day., Disp: 60 capsule, Rfl: 1  •  Eliquis 5 MG tablet tablet, TAKE ONE TABLET BY MOUTH EVERY 12 HOURS, Disp: 180 tablet, Rfl: 1  •  ferrous sulfate 325 (65 FE) MG tablet, Take 1 tablet by mouth 2 (Two) Times a Day With Meals., Disp: 30 tablet, Rfl: 1  •  furosemide (LASIX) 20 MG tablet, TAKE ONE TABLET BY MOUTH DAILY (Patient taking differently: Take 20 " mg by mouth Daily.), Disp: 30 tablet, Rfl: 11  •  hydrocortisone (ANUSOL-HC) 25 MG suppository, Insert 1 suppository into the rectum 2 (Two) Times a Day., Disp: 60 suppository, Rfl: 0  •  metoprolol tartrate (LOPRESSOR) 25 MG tablet, Take 1 tablet by mouth 2 (Two) Times a Day., Disp: 180 tablet, Rfl: 3  •  pantoprazole (PROTONIX) 40 MG EC tablet, Take 1 tablet by mouth 2 (Two) Times a Day Before Meals., Disp: 180 tablet, Rfl: 3  •  PARoxetine (PAXIL) 40 MG tablet, TAKE ONE TABLET BY MOUTH EVERY MORNING (Patient taking differently: Take 40 mg by mouth Every Morning.), Disp: 30 tablet, Rfl: 5  •  potassium chloride (MICRO-K) 10 MEQ CR capsule, Take 1 capsule by mouth Daily., Disp: 30 capsule, Rfl: 3  •  pramipexole (MIRAPEX) 1.5 MG tablet, TAKE ONE TABLET BY MOUTH TWICE A DAY (Patient taking differently: Take 1.5 mg by mouth 2 (two) times a day.), Disp: 180 tablet, Rfl: 1  •  STIOLTO RESPIMAT 2.5-2.5 MCG/ACT aerosol solution inhaler, Inhale 2 puffs Daily., Disp: , Rfl:     Current Facility-Administered Medications:   •  cyanocobalamin injection 1,000 mcg, 1,000 mcg, Intramuscular, Q28 Days, Brandin Bolton MD, 1,000 mcg at 02/25/21 1241  No Known Allergies     Objective   Vitals:    03/23/21 0926   BP: 153/72   Pulse: 60   SpO2: 97%     Physical Exam  Constitutional:       Appearance: Normal appearance. He is well-developed.   HENT:      Head: Normocephalic.   Eyes:      General: Lids are normal.      Conjunctiva/sclera: Conjunctivae normal.      Pupils: Pupils are equal, round, and reactive to light.   Neck:      Thyroid: No thyroid mass or thyromegaly.      Vascular: No carotid bruit, hepatojugular reflux or JVD.      Trachea: Trachea normal.   Cardiovascular:      Rate and Rhythm: Normal rate and regular rhythm.  No extrasystoles are present.     Chest Wall: PMI is not displaced.      Pulses: Normal pulses.      Heart sounds: Normal heart sounds, S1 normal and S2 normal.   Pulmonary:      Effort: Pulmonary effort is  normal.      Breath sounds: Normal breath sounds.   Chest:      Comments: Right thoracotomy incision is well-healed.  No subcutaneous masses or nodules.  Chest wall is stable.  Abdominal:      General: Bowel sounds are normal.      Palpations: Abdomen is soft. There is no mass.      Tenderness: There is no abdominal tenderness.      Hernia: No hernia is present.      Comments: Midline abdominal incision is well-healed.  No evidence for hernia.  No masses no organomegaly.  Good bowel sounds in all quadrants.   Musculoskeletal:         General: Normal range of motion.      Cervical back: Normal range of motion and neck supple.   Skin:     General: Skin is warm and dry.   Neurological:      Mental Status: He is alert and oriented to person, place, and time.      Cranial Nerves: No cranial nerve deficit.      Sensory: No sensory deficit.      Deep Tendon Reflexes: Reflexes are normal and symmetric.   Psychiatric:         Speech: Speech normal.         Behavior: Behavior normal.         Thought Content: Thought content normal.         Judgment: Judgment normal.       Independent Review of Radiographic Studies:    CT of the chest abdomen and pelvis performed March 17, 2021 was independently reviewed and compared to previous scans.  Postsurgical changes of esophagogastrectomy.  Some contrast noted in the proximal esophagus.  Small loculated pleural effusion which is unchanged.  Pleural thickening at the right base without change.  No suspicious hilar or mediastinal adenopathy.  Scarring in the periphery of the lateral right upper lobe and right medial lobe.  Upper abdomen is unremarkable.    Assessment/Plan   Assessment:  Patient continues to do well 6 years after his treatment for adenocarcinoma of the esophagus.  He shows no evidence of recurrent or metastatic cancer.  Chronic findings in his lungs are suggestive of ongoing silent aspiration which is not unusual following the surgery.  We talked about keeping the head of  the bed elevated and not eating or drinking anything for 3 hours prior to going to bed.  We also talked about taking some antacids at bedtime.    Plan:  Return to the office in 1 year with CT of the chest and abdomen.  I will keep you informed of his progress.  Thank you for allowing us to participate in the care of Mr. Hurtado.    Diagnoses and all orders for this visit:    Adenocarcinoma of esophagus (CMS/HCC)  -     CT Chest With Contrast Diagnostic; Future  -     CT abdomen w contrast; Future    Aftercare following surgery for neoplasm  -     CT Chest With Contrast Diagnostic; Future  -     CT abdomen w contrast; Future

## 2021-03-23 NOTE — NURSING NOTE
Lab Results   Component Value Date    WBC 3.38 (L) 03/23/2021    HGB 10.8 (L) 03/23/2021    HCT 34.1 (L) 03/23/2021    MCV 92.2 03/23/2021     (L) 03/23/2021     Pt is here for lab with RN review.  CBC reviewed with pt, counts are stable for this pt at this time. Pt has no complaints.  Copy of labs given to pt and f/u appt reviewed. Pt is instructed to call the office with any concerns or new symptoms prior to next visit. Pt vu

## 2021-03-24 ENCOUNTER — APPOINTMENT (OUTPATIENT)
Dept: PHYSICAL THERAPY | Facility: HOSPITAL | Age: 71
End: 2021-03-24

## 2021-03-24 NOTE — TELEPHONE ENCOUNTER
Caller: DENIS GONZALEZUTH [address] [place] [address] [id number] AT [facility] [phone] [place] [phone] FX    Relationship: Pharmacy    Best call back number: [phone]    Medication needed:   Requested Prescriptions     Pending Prescriptions Disp Refills   • ferrous sulfate 325 (65 FE) MG tablet 30 tablet 1     Sig: Take 1 tablet by mouth 2 (Two) Times a Day With Meals.       When do you need the refill by: ASAP    What additional details did the patient provide when requesting the medication: N/A    Does the patient have less than a 3 day supply:  [] Yes  [x] No    What is the patient's preferred pharmacy: [facility] [address] [place] [address] [address] AT [facility] [phone] [place] [phone] FX

## 2021-03-25 ENCOUNTER — APPOINTMENT (OUTPATIENT)
Dept: PHYSICAL THERAPY | Facility: HOSPITAL | Age: 71
End: 2021-03-25

## 2021-03-25 ENCOUNTER — TELEPHONE (OUTPATIENT)
Dept: ONCOLOGY | Facility: CLINIC | Age: 71
End: 2021-03-25

## 2021-03-25 ENCOUNTER — CLINICAL SUPPORT (OUTPATIENT)
Dept: INTERNAL MEDICINE | Age: 71
End: 2021-03-25

## 2021-03-25 PROCEDURE — 96372 THER/PROPH/DIAG INJ SC/IM: CPT | Performed by: INTERNAL MEDICINE

## 2021-03-25 RX ADMIN — CYANOCOBALAMIN 1000 MCG: 1000 INJECTION, SOLUTION INTRAMUSCULAR; SUBCUTANEOUS at 10:34

## 2021-03-25 NOTE — TELEPHONE ENCOUNTER
JJ CALLED TO SPEAK WITH SOMEONE ABOUT A CONCERN WITH HIS HEMOGLOBIN. HE SAYS IT DROPPED FROM 11.9 TO 10.8    PH#

## 2021-03-25 NOTE — TELEPHONE ENCOUNTER
TERRI. Pt had a RN review on 3/23/21. Katarina RN reviewed all labs at that time and there were no concerns. Advised pt to call back with any concerns.

## 2021-03-26 ENCOUNTER — APPOINTMENT (OUTPATIENT)
Dept: PHYSICAL THERAPY | Facility: HOSPITAL | Age: 71
End: 2021-03-26

## 2021-03-30 ENCOUNTER — TREATMENT (OUTPATIENT)
Dept: PHYSICAL THERAPY | Facility: CLINIC | Age: 71
End: 2021-03-30

## 2021-03-30 DIAGNOSIS — M25.652 HIP JOINT STIFFNESS, BILATERAL: ICD-10-CM

## 2021-03-30 DIAGNOSIS — R26.9 GAIT DISTURBANCE: Primary | ICD-10-CM

## 2021-03-30 DIAGNOSIS — M25.661 KNEE JOINT STIFFNESS, BILATERAL: ICD-10-CM

## 2021-03-30 DIAGNOSIS — M25.662 KNEE JOINT STIFFNESS, BILATERAL: ICD-10-CM

## 2021-03-30 DIAGNOSIS — M25.651 HIP JOINT STIFFNESS, BILATERAL: ICD-10-CM

## 2021-03-30 PROCEDURE — 97110 THERAPEUTIC EXERCISES: CPT | Performed by: PHYSICAL THERAPIST

## 2021-03-30 PROCEDURE — 97530 THERAPEUTIC ACTIVITIES: CPT | Performed by: PHYSICAL THERAPIST

## 2021-03-30 PROCEDURE — 97140 MANUAL THERAPY 1/> REGIONS: CPT | Performed by: PHYSICAL THERAPIST

## 2021-03-30 RX ORDER — FERROUS SULFATE 325(65) MG
325 TABLET ORAL 2 TIMES DAILY WITH MEALS
Qty: 30 TABLET | Refills: 11 | Status: CANCELLED | OUTPATIENT
Start: 2021-03-30

## 2021-03-30 NOTE — PROGRESS NOTES
Physical Therapy Daily Progress Note  6 treatments  Subjective     Jose Hurtado reports: he is feeling well. Notes that he has felt some improvement in his ability to stand tall while walking.         Objective   See Exercise, Manual, and Modality Logs for complete treatment.       Assessment/Plan  Continuing to progress mobility in hip flexors/ hips/ thoracic spine for upright posture. Patient is very tolerant to manual therapy mobilizations and stretching. Exercises to access new mobility have been helpful and are begin progressed as tolerated.   Progress per Plan of Care and Progress strengthening /stabilization /functional activity           Manual Therapy:    15     mins  96068;  Therapeutic Exercise:    18     mins  39124;     Neuromuscular Wil:        mins  40440;    Therapeutic Activity:     12     mins  89518;     Gait Training:           mins  32697;     Ultrasound:          mins  13244;    Electrical Stimulation:         mins  50951 ( );  Dry Needling          mins self-pay    Timed Treatment:   45   mins   Total Treatment:     45   mins    Gonzalo Solis PT DPT  Physical Therapist  [place] License # [id number]

## 2021-03-30 NOTE — TELEPHONE ENCOUNTER
Iron deficiency should be managed by his hematologist. Please advise pharmacy to contact appropriate provider.

## 2021-04-01 ENCOUNTER — TREATMENT (OUTPATIENT)
Dept: PHYSICAL THERAPY | Facility: CLINIC | Age: 71
End: 2021-04-01

## 2021-04-01 DIAGNOSIS — M25.651 HIP JOINT STIFFNESS, BILATERAL: ICD-10-CM

## 2021-04-01 DIAGNOSIS — M25.652 HIP JOINT STIFFNESS, BILATERAL: ICD-10-CM

## 2021-04-01 DIAGNOSIS — M25.661 KNEE JOINT STIFFNESS, BILATERAL: ICD-10-CM

## 2021-04-01 DIAGNOSIS — M25.662 KNEE JOINT STIFFNESS, BILATERAL: ICD-10-CM

## 2021-04-01 DIAGNOSIS — R26.9 GAIT DISTURBANCE: Primary | ICD-10-CM

## 2021-04-01 PROCEDURE — 97530 THERAPEUTIC ACTIVITIES: CPT | Performed by: PHYSICAL THERAPIST

## 2021-04-01 PROCEDURE — 97110 THERAPEUTIC EXERCISES: CPT | Performed by: PHYSICAL THERAPIST

## 2021-04-01 PROCEDURE — 97140 MANUAL THERAPY 1/> REGIONS: CPT | Performed by: PHYSICAL THERAPIST

## 2021-04-01 NOTE — PROGRESS NOTES
Physical Therapy Daily Progress Note  7 treatments  Subjective     Jose Hurtado reports: feeling well. Stated that he feels the exercises are helping. He is walking more upright and with less effort.         Objective   See Exercise, Manual, and Modality Logs for complete treatment.       Assessment/Plan   Continuing to stretch for extension and posture/ gait.   Patient tolerated all treatment well and was able to tolerate progressions in therapeutic exercises with minimal discomfort. Patient continues to need skilled therapy to improve posture, muscle length, stiffness in hips and T-spine. Patient is progressing well at this time. Will continue to advance POC as tolerated. '    Progress per Plan of Care and Progress strengthening /stabilization /functional activity           Manual Therapy:    14     mins  86554;  Therapeutic Exercise:    18     mins  69090;     Neuromuscular Wil:        mins  55313;    Therapeutic Activity:     12     mins  55632;     Gait Training:           mins  75321;     Ultrasound:          mins  22996;    Electrical Stimulation:         mins  62657 ( );  Dry Needling          mins self-pay    Timed Treatment:   44   mins   Total Treatment:     44   mins    Gonzalo Solis PT DPT  Physical Therapist  [place] License # [id number]

## 2021-04-02 ENCOUNTER — TELEPHONE (OUTPATIENT)
Dept: ONCOLOGY | Facility: CLINIC | Age: 71
End: 2021-04-02

## 2021-04-02 NOTE — TELEPHONE ENCOUNTER
Patient called because he had a v/m about his Hgb after he had called in last week. He stated that he was worried because his Hgb dropped. I pulled up his labs and his level was 10.8 and I went over his Iron labs and let him know that he did not need any further interventions such as blood or iron at this time with his results. Patient v/u.

## 2021-04-06 ENCOUNTER — TREATMENT (OUTPATIENT)
Dept: PHYSICAL THERAPY | Facility: CLINIC | Age: 71
End: 2021-04-06

## 2021-04-06 DIAGNOSIS — R26.9 GAIT DISTURBANCE: Primary | ICD-10-CM

## 2021-04-06 DIAGNOSIS — M25.652 HIP JOINT STIFFNESS, BILATERAL: ICD-10-CM

## 2021-04-06 DIAGNOSIS — M25.662 KNEE JOINT STIFFNESS, BILATERAL: ICD-10-CM

## 2021-04-06 DIAGNOSIS — M25.651 HIP JOINT STIFFNESS, BILATERAL: ICD-10-CM

## 2021-04-06 DIAGNOSIS — M25.661 KNEE JOINT STIFFNESS, BILATERAL: ICD-10-CM

## 2021-04-06 PROCEDURE — 97530 THERAPEUTIC ACTIVITIES: CPT | Performed by: PHYSICAL THERAPIST

## 2021-04-06 PROCEDURE — 97140 MANUAL THERAPY 1/> REGIONS: CPT | Performed by: PHYSICAL THERAPIST

## 2021-04-06 PROCEDURE — 97110 THERAPEUTIC EXERCISES: CPT | Performed by: PHYSICAL THERAPIST

## 2021-04-06 NOTE — PROGRESS NOTES
Physical Therapy Daily Progress Note  8 treatments  Subjective     Jose Hurtado reports: his R knee is bothering him some today with walking. Otherwise no new symptoms to report.         Objective   See Exercise, Manual, and Modality Logs for complete treatment.     Patient continues to have significant deficits in ER/ ABD strength (B) this is causing valgus at his knees and contributing to his knee pain.     Assessment/Plan   Continuing to work on posture and gait.   Patient tolerated all treatment well and was able to tolerate progressions in therapeutic exercises with minimal discomfort. Patient continues to need skilled therapy to improve thoracic and hip extension and postural muscle strengthening. Patient is progressing gradually at this time. Will continue to advance POC as tolerated.     Progress per Plan of Care and Progress strengthening /stabilization /functional activity           Manual Therapy:    12     mins  19971;  Therapeutic Exercise:    20     mins  02966;     Neuromuscular Wil:        mins  22826;    Therapeutic Activity:     12     mins  74267;     Gait Training:           mins  86257;     Ultrasound:          mins  83937;    Electrical Stimulation:         mins  09335 ( );  Dry Needling          mins self-pay    Timed Treatment:   42   mins   Total Treatment:     42   mins    Gonzalo Solis PT DPT  Physical Therapist  [place] License # [id number]

## 2021-04-08 ENCOUNTER — TREATMENT (OUTPATIENT)
Dept: PHYSICAL THERAPY | Facility: CLINIC | Age: 71
End: 2021-04-08

## 2021-04-08 DIAGNOSIS — M25.652 HIP JOINT STIFFNESS, BILATERAL: ICD-10-CM

## 2021-04-08 DIAGNOSIS — M25.662 KNEE JOINT STIFFNESS, BILATERAL: ICD-10-CM

## 2021-04-08 DIAGNOSIS — R26.9 GAIT DISTURBANCE: Primary | ICD-10-CM

## 2021-04-08 DIAGNOSIS — M25.661 KNEE JOINT STIFFNESS, BILATERAL: ICD-10-CM

## 2021-04-08 DIAGNOSIS — M25.651 HIP JOINT STIFFNESS, BILATERAL: ICD-10-CM

## 2021-04-08 PROCEDURE — 97110 THERAPEUTIC EXERCISES: CPT | Performed by: PHYSICAL THERAPIST

## 2021-04-08 PROCEDURE — 97530 THERAPEUTIC ACTIVITIES: CPT | Performed by: PHYSICAL THERAPIST

## 2021-04-08 PROCEDURE — 97140 MANUAL THERAPY 1/> REGIONS: CPT | Performed by: PHYSICAL THERAPIST

## 2021-04-08 NOTE — PROGRESS NOTES
Physical Therapy Daily Progress Note  9 treatments  Subjective     Jose Hurtado reports: he is feeling well. No new symptoms. Continues to have intermittent knee pain. Feels that he is able to stand more upright when he thinks about it.         Objective   See Exercise, Manual, and Modality Logs for complete treatment.       Assessment/Plan     Patient tolerated all treatment well and was able to tolerate progressions in therapeutic exercises with mild discomfort. Patient continues to need skilled therapy to improve posture, muscle tightness, and gait quality/ saftey. Patient is progressing well at this time. Will continue to advance POC as tolerated.     Progress per Plan of Care and Progress strengthening /stabilization /functional activity           Manual Therapy:    12     mins  45905;  Therapeutic Exercise:     18    mins  25567;     Neuromuscular Wil:        mins  82882;    Therapeutic Activity:     15     mins  22133;     Gait Training:           mins  73428;     Ultrasound:          mins  13451;    Electrical Stimulation:         mins  23341 ( );  Dry Needling          mins self-pay    Timed Treatment:   45   mins   Total Treatment:     45   mins    Gonzalo Solis PT DPT  Physical Therapist  [place] License # [id number]

## 2021-04-12 NOTE — PROGRESS NOTES
Subjective .     REASONS FOR FOLLOWUP:  Esophageal cancer, cytopenias     HISTORY OF PRESENT ILLNESS:  The patient is a 71 y.o. year old male  who is here for follow-up with the above-mentioned history.    No new problems.  Feeling fine.  No problems swallowing.  No pain issues, other than chronic joint pain.  No SOA.    Past Medical History:   Diagnosis Date   • Acute deep vein thrombosis (DVT) of popliteal vein of left lower extremity (CMS/HCC) 03/24/2020   • Anemia    • Anti-phospholipid syndrome (CMS/HCC)     On lifelong anticoagulation therapy   • Bladder disorder     LEAKAGE   ON MED  wers pads   • Bleeding hemorrhoids    • Community acquired pneumonia     HISTORY OF IN 2014   • Deep venous thrombosis (CMS/HCC) 2006, 2008    Left lower extremity multiple   • Depression    • Esophageal carcinoma (CMS/HCC) 12/31/2014    had chemo and radiation prior surgery   • Hemorrhoids    • HH (hiatus hernia)    • History of atrial fibrillation 2015    ONE EPISODE WHILE HOSPITALIZED   • History of kidney stones    • History of nephrolithiasis    • History of pancreatitis     PT STATES MANY YEARS AGO   • History of radiation therapy    • History of transfusion    • Hypertension    • Long-term (current) use of anticoagulants, INR goal 2.0-3.0    • Lymphedema    • Malignant neoplasm of prostate (CMS/HCC)    • Other hyperlipidemia 1/30/2018 January 30, 2018 lipid panel risk 12.8%   • Restless legs syndrome    • Sleep apnea     OCCASIONALLY WEARS CPAP   • Squamous carcinoma     on the head     Past Surgical History:   Procedure Laterality Date   • APPENDECTOMY  1950   • BRONCHOSCOPY      (Diagnostic)   • CATARACT EXTRACTION Bilateral 2014   • COLONOSCOPY  12/15/2014    Complete / Description: EH, IH, torts, stool, follow-up colonoscopy due in 5 years.   • COLONOSCOPY N/A 6/13/2017    non-thrombosed external hemorrhoids, normal examined ileum, IH   • COLONOSCOPY N/A 2/26/2019    Procedure: COLONOSCOPY with Cold Polypectomy;   Surgeon: Mulugeta Millan MD;  Location: [facility] ENDOSCOPY;  Service: Gastroenterology   • COLONOSCOPY N/A 12/16/2020    Procedure: COLONOSCOPY to cecum into TI;  Surgeon: Mesha Sanchez MD;  Location: [facility] ENDOSCOPY;  Service: Gastroenterology;  Laterality: N/A;  pre: lower GI bleed  post: hemorrhoids    • CYSTOSCOPY W/ LASER LITHOTRIPSY     • ENDOSCOPY N/A 6/13/2017    Procedure: ESOPHAGOGASTRODUODENOSCOPY WITH COLD BIOPSY;  Surgeon: Lake Gonzalez MD;  Location: [facility] ENDOSCOPY;  Service:    • ESOPHAGECTOMY      April 2015, stage IIB esophageal carcinoma, sub-total resection.   • ESOPHAGECTOMY      Esophagectomy Subtotal Troy Joe Procedure   • EXCISION LESION  08/2012    Removal of Squamous Cell CA on Head   • HAMMER TOE REPAIR  09/2014    Hammertoe Operation (Each Toe), 10/2014   • HAMMER TOE REPAIR Left 10/3/2017    Procedure: Left second third and fourth distal interphalangeal joint resection with flexor tenotomy;  Surgeon: Mulugeta Lira MD;  Location:  [facility];  Service:    • HEMORRHOIDECTOMY N/A 12/23/2020    Procedure: HEMORRHOIDECTOMY;  Surgeon: Billy Julio Jr., MD;  Location: [facility] MAIN OR;  Service: General;  Laterality: N/A;   • HERNIA REPAIR      incisional   • JEJUNOSTOMY      Laparoscopic   • JEJUNOSTOMY      tube removal    • KNEE SURGERY Bilateral 1967, 1973, 1981   • PATELLA SURGERY Left     removed   • PILONIDAL CYST / SINUS EXCISION     • NH TOTAL KNEE ARTHROPLASTY Right 3/26/2018    Procedure: TOTAL KNEE ARTHROPLASTY;  Surgeon: Renny Solis MD;  Location: [facility] MAIN OR;  Service: Orthopedics   • PROSTATECTOMY  2010   • PYLOROPLASTY     • SPINAL FUSION  02/1998    C 5,6   • TONSILLECTOMY     • UPPER GASTROINTESTINAL ENDOSCOPY  12/15/2014    LA Grade D esophagitis, Pardo's, HH, multiple duodenal ulcers   • VENTRAL/INCISIONAL HERNIA REPAIR N/A 4/14/2016    Procedure: VENTRAL/INCISIONAL HERNIA REPAIR, open, with mesh, and component separation;  Surgeon:  Darren Rivas MD;  Location: [facility] OR;  Service:        HEMATOLOGIC/ONCOLOGIC HISTORY:  (History from previous dates can be found in the separate document.)    MEDICATIONS    Current Outpatient Medications:   •  albuterol sulfate  (90 Base) MCG/ACT inhaler, Inhale 1 puff Every 4 (Four) Hours As Needed for Wheezing., Disp: , Rfl:   •  apixaban (ELIQUIS) 2.5 MG tablet tablet, Take 2.5 mg by mouth 2 (Two) Times a Day., Disp: , Rfl:   •  docusate sodium 100 MG capsule, Take 1 capsule by mouth 2 (Two) Times a Day., Disp: 60 capsule, Rfl: 1  •  Eliquis 5 MG tablet tablet, TAKE ONE TABLET BY MOUTH EVERY 12 HOURS, Disp: 180 tablet, Rfl: 1  •  ferrous sulfate 325 (65 FE) MG tablet, Take 1 tablet by mouth 2 (Two) Times a Day With Meals., Disp: 30 tablet, Rfl: 1  •  furosemide (LASIX) 20 MG tablet, TAKE ONE TABLET BY MOUTH DAILY (Patient taking differently: Take 20 mg by mouth Daily.), Disp: 30 tablet, Rfl: 11  •  hydrocortisone (ANUSOL-HC) 25 MG suppository, Insert 1 suppository into the rectum 2 (Two) Times a Day., Disp: 60 suppository, Rfl: 0  •  metoprolol tartrate (LOPRESSOR) 25 MG tablet, Take 1 tablet by mouth 2 (Two) Times a Day., Disp: 180 tablet, Rfl: 3  •  pantoprazole (PROTONIX) 40 MG EC tablet, Take 1 tablet by mouth 2 (Two) Times a Day Before Meals., Disp: 180 tablet, Rfl: 3  •  PARoxetine (PAXIL) 40 MG tablet, TAKE ONE TABLET BY MOUTH EVERY MORNING (Patient taking differently: Take 40 mg by mouth Every Morning.), Disp: 30 tablet, Rfl: 5  •  potassium chloride (MICRO-K) 10 MEQ CR capsule, Take 1 capsule by mouth Daily., Disp: 30 capsule, Rfl: 3  •  pramipexole (MIRAPEX) 1.5 MG tablet, TAKE ONE TABLET BY MOUTH TWICE A DAY (Patient taking differently: Take 1.5 mg by mouth 2 (two) times a day.), Disp: 180 tablet, Rfl: 1  •  STIOLTO RESPIMAT 2.5-2.5 MCG/ACT aerosol solution inhaler, Inhale 2 puffs Daily., Disp: , Rfl:     Current Facility-Administered Medications:   •  cyanocobalamin injection  "1,000 mcg, 1,000 mcg, Intramuscular, Q28 Days, Brandin Bolton MD, 1,000 mcg at 21 1034    ALLERGIES:   No Known Allergies    SOCIAL HISTORY:       Social History     Socioeconomic History   • Marital status:      Spouse name: Lena   • Number of children: 0   • Years of education: College   • Highest education level: Not on file   Tobacco Use   • Smoking status: Former Smoker     Packs/day: 2.00     Years: 3.00     Pack years: 6.00     Types: Cigarettes     Quit date:      Years since quittin.3   • Smokeless tobacco: Former User     Types: Chew   • Tobacco comment: no caffeine    Vaping Use   • Vaping Use: Never used   Substance and Sexual Activity   • Alcohol use: Yes     Alcohol/week: 3.0 standard drinks     Types: 1 Glasses of wine, 1 Cans of beer, 1 Shots of liquor per week   • Drug use: Not Currently     Types: Marijuana     Comment: hx marijuana use \"a long time ago\"   • Sexual activity: Defer         FAMILY HISTORY:  Family History   Problem Relation Age of Onset   • Cerebral aneurysm Mother         cerebral artery aneurysm ( age 56)   • Prostate cancer Brother 68   • Anxiety disorder Father    • Suicide Attempts Father          of suicide   • Cancer Father         bladder   • No Known Problems Brother    • Pancreatic cancer Nephew    • Colon cancer Neg Hx    • Esophageal cancer Neg Hx    • Dementia Neg Hx    • Malig Hyperthermia Neg Hx        REVIEW OF SYSTEMS:    Review of Systems   Constitutional: Negative for activity change.   HENT: Negative for nosebleeds and trouble swallowing.    Respiratory: Negative for shortness of breath and wheezing.    Cardiovascular: Negative for chest pain and palpitations.   Gastrointestinal: Negative for constipation and diarrhea.   Genitourinary: Negative for dysuria and hematuria.   Musculoskeletal: Negative for arthralgias and myalgias.   Neurological: Negative for seizures and syncope.   Hematological: Negative for adenopathy. Does not " "bruise/bleed easily.   Psychiatric/Behavioral: Negative for confusion.           Objective    Vitals:    04/13/21 0817   BP: 133/68   Pulse: 61   Resp: 18   Temp: 97.2 °F (36.2 °C)   TempSrc: Temporal   SpO2: 97%   Weight: 99.4 kg (219 lb 3.2 oz)   Height: 195.6 cm (77.01\")   PainSc: 0-No pain     Current Status 4/13/2021   ECOG score 0      PHYSICAL EXAM:        CONSTITUTIONAL:  Vital signs reviewed.  No distress, looks comfortable.  EYES:  Conjunctiva and lids unremarkable.  PERRLA  EARS,NOSE,MOUTH,THROAT:  Ears and nose appear unremarkable.  Lips, teeth, gums appear unremarkable.  RESPIRATORY:  Normal respiratory effort.  Lungs clear to auscultation bilaterally.  CARDIOVASCULAR:  Normal S1, S2.  No murmurs rubs or gallops.  Chronic bilateral leg edema, unchanged.  GASTROINTESTINAL: Abdomen appears unremarkable.  Nontender.  No hepatomegaly.  No splenomegaly.  LYMPHATIC:  No cervical, supraclavicular, axillary lymphadenopathy.  SKIN:  Warm.  No rashes.  PSYCHIATRIC:  Normal judgment and insight.  Normal mood and affect.         RECENT LABS:        WBC   Date/Time Value Ref Range Status   04/13/2021 08:05 AM 3.58 3.40 - 10.80 10*3/mm3 Final   04/15/2019 12:31 PM 3.53 3.40 - 10.80 10*3/mm3 Final   04/16/2018 04:25 PM 4.23 (L) 4.5 - 11.0 10*3/uL Final     Hemoglobin   Date/Time Value Ref Range Status   04/13/2021 08:05 AM 11.0 (L) 13.0 - 17.7 g/dL Final   04/16/2018 04:25 PM 9.9 (L) 13.5 - 17.5 g/dL Final     Platelets   Date/Time Value Ref Range Status   04/13/2021 08:05  (L) 140 - 450 10*3/mm3 Final   04/16/2018 04:25  140 - 440 10*3/uL Final       Assessment/Plan     ASSESSMENT:  Adenocarcinoma of esophagus (CMS/HCC)  - Ferritin  - Iron Profile  - Retic With IRF & RET-He  - CBC & Differential  - Ferritin  - Iron Profile  - Retic With IRF & RET-He  - CBC & Differential      *Esophageal adenocarcinoma. Initially T2N0M0. After neoadjuvant carboplatin/Taxol with radiation, achieved a pathologic CR at " resection, 4/24/2015.   · As per NCCN guidelines, plan CAT scans every 6 months x1 year, then every 6 to 9 months on years 2 and years 3. Defer any surveillance EGDs to Dr. Gonzalez if he feels they are appropriate.   · Also as per NCCN guidelines, plan H and P every 3 to 6 months on years 1 and years 2, then every 6 to 12 months' time on years 3 through years 5 and then annually.   · EGD 6/13/17 by Dr. Gonzalez: No evidence of recurrence.  · CT scan 3/2/18 (this completed 3 years of surveillance CT): No evidence of recurrence.  Plan no more surveillance CT.  No signs of recurrence.  Remains in remission.    *Recurrent DVT, prior to cancer diagnosis.    · Dr. Bolton previously managed his Coumadin.   · Chronic left leg larger than right, since DVT  · Acute left popliteal, gastrocnemius DVT on 3/24/2020 despite INR 3.4.  Therefore, changed to Eliquis.  · On 3/25/2020, unremarkable: Lupus anticoagulant, beta-2 glycoprotein antibodies.  Anticardiolipin antibodies also felt to be unremarkable with IgG and IgM both at 15 (negative is <15.  Indeterminate is 15-20).  No signs of recurrent DVT.    *CT angiogram chest 3/24/2020 (LLE acute DVT found 3/24/2020): Mild increased opacity LLL may represent developing infiltrate versus atelectasis.  Radiologist recommended follow-up.  · CT chest 5/1/2020: Resolution of groundglass LLL infiltrate from the 3/24/2020 CT.  A few mildly enlarged mediastinal nodes are less prominent than on the 3/18/2020 CT.  No new abnormalities.  Plan no more follow-up CT scans.    *Persistent cough.  He has seen Dr. Maher Sayied for this.    He did not complain of this today.    *Previously complained of emesis occurring 5 hours after eating on average once every month or 2.  No nausea otherwise.  Denies dysphagia or odynophagia.    Unchanged.  Occurring on average once every couple of months.  He did not complain of this today    *Iron deficiency anemia  · Hb mostly around 11  · On 4/15/2019, ferritin 29.7,  13% saturation.  Serum folate normal.  · On oral iron daily through PCP.  · On 8/23/19, ferritin 65, 14%.   · Increased oral iron.   · On 10/15/19, ferritin 72, 10%.  · On 10/25/2019, stopped oral iron since it was not helping.    · Oral iron not effective due to poor absorption  · 2 doses Injectafer.  · On 12/13/2019, ferritin 708, 15% saturation, Hb 10.4.  · Therefore, no IV iron.  Monitor.  · On 5/5/2020, ferritin 346, 15% saturation, Hb 9.9.  Therefore, no need for IV iron at this time.  · On 7/7/2020, Hb up to 11.7.  Iron labs normal.  · Admission 12/15/2020: Hb 6.6.  Ferritin 40, iron saturation 17%.  Discharged on 12/21/2020 with Hb 7.1, which fell from 7.9 on 12/18/2020, from 8.9 in the early morning 12/18/2020.  The drop in Hb was thought to be due to hemorrhoidal bleeding related to Eliquis.  Eliquis was stopped.  Hemorrhoidal repair planned by Dr. Flynn Julio after the holidays.  Was given Venofer 300 mg on 12/17/2020 by Dr. Ross of our practice  · On 12/29/2020, ferritin 176, 13% saturation, Hb 8.4, reticulate hemoglobin low at 25.7.  Suspect he would benefit from some more iron.  Given 1 dose Injectafer.  · On 2/2/2021, ferritin 317, 17% saturation, Hb 10.3.  Therefore, Hb gradually improved since the 1 dose of Injectafer.  · 3/2/2021: Ferritin iron 93, 11% saturation, Hb 11.9.  1 dose Injectafer.  · 3/23/2021: Ferritin 471, 20% saturation, Hb 10.8    *Hemorrhoidal bleeding with Hb down to 6.6, requiring admission, 12/15/2020.  Thought to be related to Eliquis.  · Eliquis was stopped.  · Hemorrhoidal repair by Dr. Flynn Julio, 12/23/2020  · Eliquis subsequently restarted with no more issues with bleeding.    *Source of iron deficiency.  · Last colonoscopy 2/26/2019 by Dr. Millan.  Last EGD 6/13/2017 by Dr. Gonzalez.  · Suspect he has an absorption issue due to previous esophageal surgery.    *B12 deficiency.  · On 6/6/2019, B12 <150  · On B12 injections through PCP.  B12 on 5/5/2020 normal at 694  B12  on 2/2/2021, 789    *Anemia for reasons in addition to iron deficiency and B12 deficiency  · On 5/5/2020, unremarkable: RBC folate, iron labs, B12, haptoglobin, TB, LDH, direct Maki.  · Creatinine baseline is 0.9-1.2   · Hb 9.9 on 5/5/2020  · On 7/7/2020, Hb up to 11.7.  Return to prior follow-up plan.  · On 2/2/2021, B12 and RBC folate unremarkable  · 3/23/2021: Hb 10.8 despite normal iron stores.    *Leukocytopenia  · New issue on 3/23/2021, WBC 3.38, based on recent labs, but WBC has been as low as 2.9 December 2019  · WBC up to 3.58 today, 4/13/2021    *Neutropenia  · ANC 1590 on 3/23/2021 (previously ANC has been as low as 1480 with WBC in the low normal range)  · ANC up to 1680    *Thrombocytopenia  · New issue on 3/23/2021, , based on recent labs, but PLT has been as low as 119 October 2019  · .  Still low, but better.    *He had a white blood cell count in the upper 3s and a hemoglobin in the upper 12s with a normal platelet count prior to beginning chemotherapy.     PLAN:  · Hb back up to baseline.    · Every 6 week: Stat iron labs, CBC  · MD lab 12 weeks  · IV iron as needed  · Baseline Hb when not in the hospital mostly 9.5-11.5  · Continue Eliquis  · Note on 3/23/2021 visit with Dr. Christine he planned a 1 year follow-up and CT chest and abdomen 1 week prior.    Prior plan was:  · MD every 6-month.  · No more surveillance CT scans.  · He does not have a port.    I have discussed with him if Hb drops and remains around 9 or so, we may want to plan a bone marrow biopsy

## 2021-04-13 ENCOUNTER — TREATMENT (OUTPATIENT)
Dept: PHYSICAL THERAPY | Facility: CLINIC | Age: 71
End: 2021-04-13

## 2021-04-13 ENCOUNTER — LAB (OUTPATIENT)
Dept: OTHER | Facility: HOSPITAL | Age: 71
End: 2021-04-13

## 2021-04-13 ENCOUNTER — OFFICE VISIT (OUTPATIENT)
Dept: ONCOLOGY | Facility: CLINIC | Age: 71
End: 2021-04-13

## 2021-04-13 VITALS
HEIGHT: 77 IN | DIASTOLIC BLOOD PRESSURE: 68 MMHG | WEIGHT: 219.2 LBS | SYSTOLIC BLOOD PRESSURE: 133 MMHG | RESPIRATION RATE: 18 BRPM | OXYGEN SATURATION: 97 % | HEART RATE: 61 BPM | TEMPERATURE: 97.2 F | BODY MASS INDEX: 25.88 KG/M2

## 2021-04-13 DIAGNOSIS — M25.662 KNEE JOINT STIFFNESS, BILATERAL: ICD-10-CM

## 2021-04-13 DIAGNOSIS — C15.9 ADENOCARCINOMA OF ESOPHAGUS (HCC): Primary | ICD-10-CM

## 2021-04-13 DIAGNOSIS — M25.651 HIP JOINT STIFFNESS, BILATERAL: ICD-10-CM

## 2021-04-13 DIAGNOSIS — M25.652 HIP JOINT STIFFNESS, BILATERAL: ICD-10-CM

## 2021-04-13 DIAGNOSIS — D50.9 IRON DEFICIENCY ANEMIA, UNSPECIFIED IRON DEFICIENCY ANEMIA TYPE: ICD-10-CM

## 2021-04-13 DIAGNOSIS — R26.9 GAIT DISTURBANCE: Primary | ICD-10-CM

## 2021-04-13 DIAGNOSIS — M25.661 KNEE JOINT STIFFNESS, BILATERAL: ICD-10-CM

## 2021-04-13 LAB
BASOPHILS # BLD AUTO: 0.02 10*3/MM3 (ref 0–0.2)
BASOPHILS NFR BLD AUTO: 0.6 % (ref 0–1.5)
DEPRECATED RDW RBC AUTO: 49.8 FL (ref 37–54)
EOSINOPHIL # BLD AUTO: 0.32 10*3/MM3 (ref 0–0.4)
EOSINOPHIL NFR BLD AUTO: 8.9 % (ref 0.3–6.2)
ERYTHROCYTE [DISTWIDTH] IN BLOOD BY AUTOMATED COUNT: 14.9 % (ref 12.3–15.4)
HCT VFR BLD AUTO: 34 % (ref 37.5–51)
HGB BLD-MCNC: 11 G/DL (ref 13–17.7)
IMM GRANULOCYTES # BLD AUTO: 0.01 10*3/MM3 (ref 0–0.05)
IMM GRANULOCYTES NFR BLD AUTO: 0.3 % (ref 0–0.5)
LYMPHOCYTES # BLD AUTO: 1.19 10*3/MM3 (ref 0.7–3.1)
LYMPHOCYTES NFR BLD AUTO: 33.2 % (ref 19.6–45.3)
MCH RBC QN AUTO: 29.4 PG (ref 26.6–33)
MCHC RBC AUTO-ENTMCNC: 32.4 G/DL (ref 31.5–35.7)
MCV RBC AUTO: 90.9 FL (ref 79–97)
MONOCYTES # BLD AUTO: 0.36 10*3/MM3 (ref 0.1–0.9)
MONOCYTES NFR BLD AUTO: 10.1 % (ref 5–12)
NEUTROPHILS NFR BLD AUTO: 1.68 10*3/MM3 (ref 1.7–7)
NEUTROPHILS NFR BLD AUTO: 46.9 % (ref 42.7–76)
NRBC BLD AUTO-RTO: 0 /100 WBC (ref 0–0.2)
PLATELET # BLD AUTO: 126 10*3/MM3 (ref 140–450)
PMV BLD AUTO: 9.3 FL (ref 6–12)
RBC # BLD AUTO: 3.74 10*6/MM3 (ref 4.14–5.8)
WBC # BLD AUTO: 3.58 10*3/MM3 (ref 3.4–10.8)

## 2021-04-13 PROCEDURE — 97530 THERAPEUTIC ACTIVITIES: CPT | Performed by: PHYSICAL THERAPIST

## 2021-04-13 PROCEDURE — 97140 MANUAL THERAPY 1/> REGIONS: CPT | Performed by: PHYSICAL THERAPIST

## 2021-04-13 PROCEDURE — 36415 COLL VENOUS BLD VENIPUNCTURE: CPT

## 2021-04-13 PROCEDURE — 99214 OFFICE O/P EST MOD 30 MIN: CPT | Performed by: INTERNAL MEDICINE

## 2021-04-13 PROCEDURE — 85025 COMPLETE CBC W/AUTO DIFF WBC: CPT | Performed by: INTERNAL MEDICINE

## 2021-04-13 PROCEDURE — 97110 THERAPEUTIC EXERCISES: CPT | Performed by: PHYSICAL THERAPIST

## 2021-04-13 NOTE — PROGRESS NOTES
Physical Therapy Daily Progress Note  10 treatments  Subjective     Jose Hurtado reports: feeling well. No new symptoms. Feeling better but still very stiff.         Objective   See Exercise, Manual, and Modality Logs for complete treatment.       Assessment/Plan     Continuing to work on mobility. Patient responded well to hip mobilizations today.   Patient tolerated all treatment well and was able to tolerate progressions in therapeutic exercises with mild discomfort. Patient continues to need skilled therapy to improve gait, posture, muscle tightness. Patient is progressing gradually at this time. Will continue to advance POC as tolerated.     Progress per Plan of Care and Progress strengthening /stabilization /functional activity           Manual Therapy:    14     mins  73460;  Therapeutic Exercise:    15     mins  30133;     Neuromuscular Wil:        mins  28119;    Therapeutic Activity:     15     mins  00655;     Gait Training:           mins  65743;     Ultrasound:          mins  12993;    Electrical Stimulation:         mins  11237 ( );  Dry Needling          mins self-pay    Timed Treatment:   44   mins   Total Treatment:     44   mins    Gonzalo Solis PT DPT  Physical Therapist  [place] License # [id number]

## 2021-04-15 ENCOUNTER — TREATMENT (OUTPATIENT)
Dept: PHYSICAL THERAPY | Facility: CLINIC | Age: 71
End: 2021-04-15

## 2021-04-15 DIAGNOSIS — M25.652 HIP JOINT STIFFNESS, BILATERAL: ICD-10-CM

## 2021-04-15 DIAGNOSIS — M25.651 HIP JOINT STIFFNESS, BILATERAL: ICD-10-CM

## 2021-04-15 DIAGNOSIS — R26.9 GAIT DISTURBANCE: Primary | ICD-10-CM

## 2021-04-15 PROCEDURE — 97110 THERAPEUTIC EXERCISES: CPT | Performed by: PHYSICAL THERAPIST

## 2021-04-15 PROCEDURE — 97530 THERAPEUTIC ACTIVITIES: CPT | Performed by: PHYSICAL THERAPIST

## 2021-04-15 PROCEDURE — 97140 MANUAL THERAPY 1/> REGIONS: CPT | Performed by: PHYSICAL THERAPIST

## 2021-04-15 NOTE — PROGRESS NOTES
Physical Therapy Daily Progress Note  11 treatments  Subjective     Jose Hurtado reports: feeling well today. Good energy. Notes that he is walking faster but has difficulty still with standing upright.        Objective   See Exercise, Manual, and Modality Logs for complete treatment.       Assessment/Plan  Patient is making gradual progress in PT. His gait speed has improved but he does continue to have difficulty with upright posture which is primarily due to thoracic and hip tightness. PT continues to work on these areas and use TE/ TA that put patient in good positions for tissue/ joint stretching .   Progress per Plan of Care and Progress strengthening /stabilization /functional activity           Manual Therapy:    15     mins  55754;  Therapeutic Exercise:    16     mins  24833;     Neuromuscular Wil:        mins  27542;    Therapeutic Activity:     14     mins  24799;     Gait Training:           mins  88371;     Ultrasound:          mins  26856;    Electrical Stimulation:         mins  96705 ( );  Dry Needling          mins self-pay    Timed Treatment:   45   mins   Total Treatment:     45   mins    Gonzalo Solis PT DPT  Physical Therapist  [place] License # [id number]

## 2021-04-26 ENCOUNTER — CLINICAL SUPPORT (OUTPATIENT)
Dept: INTERNAL MEDICINE | Age: 71
End: 2021-04-26

## 2021-04-26 PROCEDURE — 96372 THER/PROPH/DIAG INJ SC/IM: CPT | Performed by: INTERNAL MEDICINE

## 2021-04-26 RX ADMIN — CYANOCOBALAMIN 1000 MCG: 1000 INJECTION, SOLUTION INTRAMUSCULAR; SUBCUTANEOUS at 10:17

## 2021-04-27 ENCOUNTER — TREATMENT (OUTPATIENT)
Dept: PHYSICAL THERAPY | Facility: CLINIC | Age: 71
End: 2021-04-27

## 2021-04-27 DIAGNOSIS — R26.9 GAIT DISTURBANCE: Primary | ICD-10-CM

## 2021-04-27 DIAGNOSIS — M25.651 HIP JOINT STIFFNESS, BILATERAL: ICD-10-CM

## 2021-04-27 DIAGNOSIS — M25.652 HIP JOINT STIFFNESS, BILATERAL: ICD-10-CM

## 2021-04-27 DIAGNOSIS — M25.661 KNEE JOINT STIFFNESS, BILATERAL: ICD-10-CM

## 2021-04-27 DIAGNOSIS — M25.662 KNEE JOINT STIFFNESS, BILATERAL: ICD-10-CM

## 2021-04-27 PROCEDURE — 97140 MANUAL THERAPY 1/> REGIONS: CPT | Performed by: PHYSICAL THERAPIST

## 2021-04-27 PROCEDURE — 97530 THERAPEUTIC ACTIVITIES: CPT | Performed by: PHYSICAL THERAPIST

## 2021-04-27 PROCEDURE — 97110 THERAPEUTIC EXERCISES: CPT | Performed by: PHYSICAL THERAPIST

## 2021-04-27 NOTE — PROGRESS NOTES
Physical Therapy Daily Progress Note  12 treatments  Subjective     Jose Hurtado reports: feeling tired today. Notes that his gait and posture in standing have continued to gradually improve.         Objective   See Exercise, Manual, and Modality Logs for complete treatment.       Assessment/Plan  Patient tolerated all treatment well. Beginning to see plateau in progress and discussed D/C planning with patient on this date. Patient has made progress in gait quality, posture, and activity tolerance. He is feeling more (I) with home exercises and plans to work with a  following PT.     Anticipate D/C in 3-4 visits.   Progress per Plan of Care and Progress strengthening /stabilization /functional activity           Manual Therapy:    15     mins  74407;  Therapeutic Exercise:    15     mins  29841;     Neuromuscular Wil:        mins  77174;    Therapeutic Activity:     14     mins  45051;     Gait Training:           mins  43072;     Ultrasound:          mins  75541;    Electrical Stimulation:         mins  61540 ( );  Dry Needling          mins self-pay    Timed Treatment:   44   mins   Total Treatment:     44   mins    Gonzalo Solis PT DPT  Physical Therapist  [place] License # [id number]

## 2021-04-29 ENCOUNTER — TREATMENT (OUTPATIENT)
Dept: PHYSICAL THERAPY | Facility: CLINIC | Age: 71
End: 2021-04-29

## 2021-04-29 DIAGNOSIS — M25.651 HIP JOINT STIFFNESS, BILATERAL: ICD-10-CM

## 2021-04-29 DIAGNOSIS — M25.662 KNEE JOINT STIFFNESS, BILATERAL: ICD-10-CM

## 2021-04-29 DIAGNOSIS — R26.9 GAIT DISTURBANCE: Primary | ICD-10-CM

## 2021-04-29 DIAGNOSIS — M25.661 KNEE JOINT STIFFNESS, BILATERAL: ICD-10-CM

## 2021-04-29 DIAGNOSIS — M25.652 HIP JOINT STIFFNESS, BILATERAL: ICD-10-CM

## 2021-04-29 PROCEDURE — 97110 THERAPEUTIC EXERCISES: CPT | Performed by: PHYSICAL THERAPIST

## 2021-04-29 PROCEDURE — 97140 MANUAL THERAPY 1/> REGIONS: CPT | Performed by: PHYSICAL THERAPIST

## 2021-04-29 PROCEDURE — 97530 THERAPEUTIC ACTIVITIES: CPT | Performed by: PHYSICAL THERAPIST

## 2021-04-29 NOTE — PROGRESS NOTES
"Physical Therapy Daily Progress Note  13 treatments  Subjective     Jose Hurtado reports: feeling well today. \"a little stiff\" but no new symptoms.         Objective   See Exercise, Manual, and Modality Logs for complete treatment.       Assessment/Plan   No treatment progressions on this date. Patient gradually responding to POC. Continued D/C planning and discussions for home program options to replicate in person treatments as much as possible.  Patient tolerated all treatment well and was able to tolerate progressions in therapeutic exercises with minimal discomfort. Patient continues to need skilled therapy to improve gait, posture, muscle tightness and independence with home regimen . Patient is progressing gradually at this time. Will continue to advance POC as tolerated.     Progress per Plan of Care and Progress strengthening /stabilization /functional activity           Manual Therapy:    10     mins  07407;  Therapeutic Exercise:    22     mins  50545;     Neuromuscular Wil:        mins  08620;    Therapeutic Activity:     10     mins  73905;     Gait Training:           mins  50087;     Ultrasound:          mins  69468;    Electrical Stimulation:         mins  23594 ( );  Dry Needling          mins self-pay    Timed Treatment:   42   mins   Total Treatment:     42   mins    Gonzalo Solis PT DPT  Physical Therapist  [place] License # [id number]  "

## 2021-05-05 DIAGNOSIS — G25.81 RLS (RESTLESS LEGS SYNDROME): Chronic | ICD-10-CM

## 2021-05-06 ENCOUNTER — TREATMENT (OUTPATIENT)
Dept: PHYSICAL THERAPY | Facility: CLINIC | Age: 71
End: 2021-05-06

## 2021-05-06 PROCEDURE — 97110 THERAPEUTIC EXERCISES: CPT | Performed by: PHYSICAL THERAPIST

## 2021-05-06 PROCEDURE — 97140 MANUAL THERAPY 1/> REGIONS: CPT | Performed by: PHYSICAL THERAPIST

## 2021-05-06 RX ORDER — PRAMIPEXOLE DIHYDROCHLORIDE 1.5 MG/1
1.5 TABLET ORAL 2 TIMES DAILY
Qty: 180 TABLET | Refills: 1 | Status: SHIPPED | OUTPATIENT
Start: 2021-05-06 | End: 2021-05-07

## 2021-05-06 NOTE — PROGRESS NOTES
Physical Therapy Daily Progress/ Recertification Note  14 treatments  Subjective     Jose Hurtado reports: he is feeling tired today. Notes that he worked outside on his truck much of the day yesterday.     ABC balance scale     Objective   See Exercise, Manual, and Modality Logs for complete treatment.     Objective values from evaluation were unchanged on this date with strength and ROM.     Access Code: [email or web address]  URL: [email or web address]  Date: 05/06/2021  Prepared by: Gonzalo Solis    Exercises  Tandem Stance with Support - 2 x daily - 5 x weekly - 12 reps - 2 sets  Single Leg Balance in March Position - 2 x daily - 5 x weekly - 12 reps - 2 sets  Heel Toe Raises with Counter Support - 2 x daily - 5 x weekly - 12 reps - 2 sets  Tandem Walking with Counter Support - 2 x daily - 5 x weekly - 12 reps - 2 sets  Staggered Stance Step Throughs - 2 x daily - 5 x weekly - 12 reps - 2 sets  Lateral Step Up with Counter Support - 2 x daily - 5 x weekly - 12 reps - 2 sets  Forward Step Up with Counter Support - 2 x daily - 5 x weekly - 12 reps - 2 sets    Assessment/Plan       This patient has made good progress over the past 30 days of treatment. They have shown improvement in subjective balance/ ADL function, activity tolerance, and safety awareness. Many of patients deficits in terms of posture and LE function are quite chronic and at this time PT feels that Patient is safe to continue with exercises related to his gait and posture at home independent of skilled PT. Addressed final HEP with patient today for gait and posture training. Reported (I) with all exercises. Patient has an ortho follow up for his knee pain next week and may be seen for this issue if deemed appropriate by ortho.     Goals  Plan Goals: Short Term Goals: (3 weeks)  1. Pt to demonstrate heel toe gait with clinic ambulation  2. Pt to exhibit increased passive knee extension by 5 degrees to assist with improved heel strike B  3. Pt to be (I)  with HEP    2/3 met    Long Term Goals: (8 weeks)  1. Pt able to traverse uneven ground, step over curbs without LOB in the community  2. Pt able to ascend/descend 5 steps without rail assist  3. Pt to score 80% confidence on ABC scale  4. Pt to exhibit 5/5 hip abduction strength L to help correct valgus at L knee    2/4 met    D/C to HEP for this POC         Manual Therapy:    15     mins  92787;  Therapeutic Exercise:    24     mins  86462;     Neuromuscular Wil:        mins  72839;    Therapeutic Activity:          mins  47533;     Gait Training:           mins  59975;     Ultrasound:          mins  76011;    Electrical Stimulation:         mins  06443 ( );  Dry Needling          mins self-pay    Timed Treatment:   39   mins   Total Treatment:     39   mins    Gonzalo Solis PT DPT  Physical Therapist  [place] License # [id number]

## 2021-05-06 NOTE — PATIENT INSTRUCTIONS
Access Code: [email or web address]  URL: [email or web address]  Date: 05/06/2021  Prepared by: Gonzalo Solis    Exercises  Tandem Stance with Support - 2 x daily - 5 x weekly - 12 reps - 2 sets  Single Leg Balance in March Position - 2 x daily - 5 x weekly - 12 reps - 2 sets  Heel Toe Raises with Counter Support - 2 x daily - 5 x weekly - 12 reps - 2 sets  Tandem Walking with Counter Support - 2 x daily - 5 x weekly - 12 reps - 2 sets  Staggered Stance Step Throughs - 2 x daily - 5 x weekly - 12 reps - 2 sets  Lateral Step Up with Counter Support - 2 x daily - 5 x weekly - 12 reps - 2 sets  Forward Step Up with Counter Support - 2 x daily - 5 x weekly - 12 reps - 2 sets

## 2021-05-07 DIAGNOSIS — G25.81 RLS (RESTLESS LEGS SYNDROME): Chronic | ICD-10-CM

## 2021-05-07 RX ORDER — PRAMIPEXOLE DIHYDROCHLORIDE 1.5 MG/1
TABLET ORAL
Qty: 180 TABLET | Refills: 1 | Status: SHIPPED | OUTPATIENT
Start: 2021-05-07 | End: 2022-07-05

## 2021-05-10 ENCOUNTER — HOSPITAL ENCOUNTER (OUTPATIENT)
Facility: HOSPITAL | Age: 71
Setting detail: OBSERVATION
Discharge: HOME | End: 2021-05-13
Attending: EMERGENCY MEDICINE | Admitting: STUDENT IN AN ORGANIZED HEALTH CARE EDUCATION/TRAINING PROGRAM

## 2021-05-10 DIAGNOSIS — R26.81 UNSTEADY GAIT: ICD-10-CM

## 2021-05-10 DIAGNOSIS — I89.0 LYMPHEDEMA: ICD-10-CM

## 2021-05-10 DIAGNOSIS — R77.8 ELEVATED TROPONIN: ICD-10-CM

## 2021-05-10 DIAGNOSIS — R07.81 PLEURITIC CHEST PAIN: Primary | ICD-10-CM

## 2021-05-10 DIAGNOSIS — J90 PLEURAL EFFUSION: ICD-10-CM

## 2021-05-10 PROCEDURE — 99285 EMERGENCY DEPT VISIT HI MDM: CPT

## 2021-05-10 PROCEDURE — 93005 ELECTROCARDIOGRAM TRACING: CPT | Performed by: EMERGENCY MEDICINE

## 2021-05-11 ENCOUNTER — APPOINTMENT (OUTPATIENT)
Dept: CT IMAGING | Facility: HOSPITAL | Age: 71
End: 2021-05-11

## 2021-05-11 ENCOUNTER — APPOINTMENT (OUTPATIENT)
Dept: GENERAL RADIOLOGY | Facility: HOSPITAL | Age: 71
End: 2021-05-11

## 2021-05-11 PROBLEM — Z79.01 CHRONIC ANTICOAGULATION: Status: ACTIVE | Noted: 2021-05-11

## 2021-05-11 PROBLEM — Z85.01 HISTORY OF ESOPHAGEAL CANCER: Status: ACTIVE | Noted: 2021-05-11

## 2021-05-11 PROBLEM — R53.1 GENERALIZED WEAKNESS: Status: ACTIVE | Noted: 2021-05-11

## 2021-05-11 PROBLEM — N39.0 ACUTE UTI (URINARY TRACT INFECTION): Status: ACTIVE | Noted: 2021-05-11

## 2021-05-11 PROBLEM — R07.81 PLEURITIC CHEST PAIN: Status: ACTIVE | Noted: 2021-05-11

## 2021-05-11 PROBLEM — R93.89 ABNORMAL CT SCAN: Status: ACTIVE | Noted: 2021-05-11

## 2021-05-11 LAB
ALBUMIN SERPL-MCNC: 3.3 G/DL (ref 3.5–5.2)
ALBUMIN/GLOB SERPL: 1.2 G/DL
ALP SERPL-CCNC: 100 U/L (ref 39–117)
ALT SERPL W P-5'-P-CCNC: 11 U/L (ref 1–41)
ANION GAP SERPL CALCULATED.3IONS-SCNC: 8.2 MMOL/L (ref 5–15)
AST SERPL-CCNC: 16 U/L (ref 1–40)
BACTERIA UR QL AUTO: ABNORMAL /HPF
BASOPHILS # BLD AUTO: 0.01 10*3/MM3 (ref 0–0.2)
BASOPHILS NFR BLD AUTO: 0.3 % (ref 0–1.5)
BILIRUB SERPL-MCNC: 0.7 MG/DL (ref 0–1.2)
BILIRUB UR QL STRIP: NEGATIVE
BUN SERPL-MCNC: 17 MG/DL (ref 8–23)
BUN/CREAT SERPL: 12.2 (ref 7–25)
CALCIUM SPEC-SCNC: 8 MG/DL (ref 8.6–10.5)
CHLORIDE SERPL-SCNC: 105 MMOL/L (ref 98–107)
CLARITY UR: ABNORMAL
CO2 SERPL-SCNC: 23.8 MMOL/L (ref 22–29)
COLOR UR: YELLOW
CREAT SERPL-MCNC: 1.39 MG/DL (ref 0.76–1.27)
DEPRECATED RDW RBC AUTO: 46.4 FL (ref 37–54)
EOSINOPHIL # BLD AUTO: 0.06 10*3/MM3 (ref 0–0.4)
EOSINOPHIL NFR BLD AUTO: 2 % (ref 0.3–6.2)
ERYTHROCYTE [DISTWIDTH] IN BLOOD BY AUTOMATED COUNT: 14.7 % (ref 12.3–15.4)
GFR SERPL CREATININE-BSD FRML MDRD: 50 ML/MIN/1.73
GLOBULIN UR ELPH-MCNC: 2.8 GM/DL
GLUCOSE SERPL-MCNC: 116 MG/DL (ref 65–99)
GLUCOSE UR STRIP-MCNC: NEGATIVE MG/DL
HCT VFR BLD AUTO: 31.4 % (ref 37.5–51)
HGB BLD-MCNC: 10.6 G/DL (ref 13–17.7)
HGB UR QL STRIP.AUTO: ABNORMAL
HOLD SPECIMEN: NORMAL
HOLD SPECIMEN: NORMAL
HYALINE CASTS UR QL AUTO: ABNORMAL /LPF
IMM GRANULOCYTES # BLD AUTO: 0.01 10*3/MM3 (ref 0–0.05)
IMM GRANULOCYTES NFR BLD AUTO: 0.3 % (ref 0–0.5)
KETONES UR QL STRIP: NEGATIVE
LEUKOCYTE ESTERASE UR QL STRIP.AUTO: ABNORMAL
LYMPHOCYTES # BLD AUTO: 0.57 10*3/MM3 (ref 0.7–3.1)
LYMPHOCYTES NFR BLD AUTO: 18.7 % (ref 19.6–45.3)
MCH RBC QN AUTO: 29.9 PG (ref 26.6–33)
MCHC RBC AUTO-ENTMCNC: 33.8 G/DL (ref 31.5–35.7)
MCV RBC AUTO: 88.7 FL (ref 79–97)
MONOCYTES # BLD AUTO: 0.41 10*3/MM3 (ref 0.1–0.9)
MONOCYTES NFR BLD AUTO: 13.4 % (ref 5–12)
NEUTROPHILS NFR BLD AUTO: 1.99 10*3/MM3 (ref 1.7–7)
NEUTROPHILS NFR BLD AUTO: 65.3 % (ref 42.7–76)
NITRITE UR QL STRIP: NEGATIVE
NRBC BLD AUTO-RTO: 0.3 /100 WBC (ref 0–0.2)
PH UR STRIP.AUTO: 6 [PH] (ref 5–8)
PLATELET # BLD AUTO: 151 10*3/MM3 (ref 140–450)
PMV BLD AUTO: 9.4 FL (ref 6–12)
POTASSIUM SERPL-SCNC: 4.1 MMOL/L (ref 3.5–5.2)
PROCALCITONIN SERPL-MCNC: 0.21 NG/ML (ref 0–0.25)
PROT SERPL-MCNC: 6.1 G/DL (ref 6–8.5)
PROT UR QL STRIP: ABNORMAL
QT INTERVAL: 388 MS
RBC # BLD AUTO: 3.54 10*6/MM3 (ref 4.14–5.8)
RBC # UR: ABNORMAL /HPF
REF LAB TEST METHOD: ABNORMAL
SARS-COV-2 ORF1AB RESP QL NAA+PROBE: NOT DETECTED
SODIUM SERPL-SCNC: 137 MMOL/L (ref 136–145)
SP GR UR STRIP: 1.02 (ref 1–1.03)
SQUAMOUS #/AREA URNS HPF: ABNORMAL /HPF
TROPONIN T SERPL-MCNC: 0.05 NG/ML (ref 0–0.03)
TROPONIN T SERPL-MCNC: 0.05 NG/ML (ref 0–0.03)
TROPONIN T SERPL-MCNC: 0.06 NG/ML (ref 0–0.03)
UROBILINOGEN UR QL STRIP: ABNORMAL
WBC # BLD AUTO: 3.05 10*3/MM3 (ref 3.4–10.8)
WBC CLUMPS # UR AUTO: ABNORMAL /HPF
WBC UR QL AUTO: ABNORMAL /HPF
WHOLE BLOOD HOLD SPECIMEN: NORMAL
WHOLE BLOOD HOLD SPECIMEN: NORMAL

## 2021-05-11 PROCEDURE — G0378 HOSPITAL OBSERVATION PER HR: HCPCS

## 2021-05-11 PROCEDURE — 80053 COMPREHEN METABOLIC PANEL: CPT | Performed by: PHYSICIAN ASSISTANT

## 2021-05-11 PROCEDURE — 71045 X-RAY EXAM CHEST 1 VIEW: CPT

## 2021-05-11 PROCEDURE — 36415 COLL VENOUS BLD VENIPUNCTURE: CPT | Performed by: NURSE PRACTITIONER

## 2021-05-11 PROCEDURE — 97162 PT EVAL MOD COMPLEX 30 MIN: CPT

## 2021-05-11 PROCEDURE — 84484 ASSAY OF TROPONIN QUANT: CPT | Performed by: PHYSICIAN ASSISTANT

## 2021-05-11 PROCEDURE — 87088 URINE BACTERIA CULTURE: CPT | Performed by: NURSE PRACTITIONER

## 2021-05-11 PROCEDURE — 87186 SC STD MICRODIL/AGAR DIL: CPT | Performed by: NURSE PRACTITIONER

## 2021-05-11 PROCEDURE — 84145 PROCALCITONIN (PCT): CPT | Performed by: PHYSICIAN ASSISTANT

## 2021-05-11 PROCEDURE — 94799 UNLISTED PULMONARY SVC/PX: CPT

## 2021-05-11 PROCEDURE — 85025 COMPLETE CBC W/AUTO DIFF WBC: CPT | Performed by: PHYSICIAN ASSISTANT

## 2021-05-11 PROCEDURE — 25010000002 CEFTRIAXONE PER 250 MG: Performed by: NURSE PRACTITIONER

## 2021-05-11 PROCEDURE — 97165 OT EVAL LOW COMPLEX 30 MIN: CPT | Performed by: OCCUPATIONAL THERAPIST

## 2021-05-11 PROCEDURE — 81001 URINALYSIS AUTO W/SCOPE: CPT | Performed by: NURSE PRACTITIONER

## 2021-05-11 PROCEDURE — 0 IOPAMIDOL PER 1 ML: Performed by: EMERGENCY MEDICINE

## 2021-05-11 PROCEDURE — U0004 COV-19 TEST NON-CDC HGH THRU: HCPCS | Performed by: PHYSICIAN ASSISTANT

## 2021-05-11 PROCEDURE — 97166 OT EVAL MOD COMPLEX 45 MIN: CPT | Performed by: OCCUPATIONAL THERAPIST

## 2021-05-11 PROCEDURE — 99213 OFFICE O/P EST LOW 20 MIN: CPT | Performed by: INTERNAL MEDICINE

## 2021-05-11 PROCEDURE — 97535 SELF CARE MNGMENT TRAINING: CPT | Performed by: OCCUPATIONAL THERAPIST

## 2021-05-11 PROCEDURE — 93010 ELECTROCARDIOGRAM REPORT: CPT | Performed by: INTERNAL MEDICINE

## 2021-05-11 PROCEDURE — 84484 ASSAY OF TROPONIN QUANT: CPT | Performed by: NURSE PRACTITIONER

## 2021-05-11 PROCEDURE — 97110 THERAPEUTIC EXERCISES: CPT

## 2021-05-11 PROCEDURE — 87086 URINE CULTURE/COLONY COUNT: CPT | Performed by: NURSE PRACTITIONER

## 2021-05-11 PROCEDURE — 74176 CT ABD & PELVIS W/O CONTRAST: CPT

## 2021-05-11 PROCEDURE — 96365 THER/PROPH/DIAG IV INF INIT: CPT

## 2021-05-11 PROCEDURE — 71275 CT ANGIOGRAPHY CHEST: CPT

## 2021-05-11 RX ORDER — NITROGLYCERIN 0.4 MG/1
0.4 TABLET SUBLINGUAL
Status: DISCONTINUED | OUTPATIENT
Start: 2021-05-11 | End: 2021-05-13 | Stop reason: HOSPADM

## 2021-05-11 RX ORDER — POTASSIUM CHLORIDE 750 MG/1
10 TABLET, FILM COATED, EXTENDED RELEASE ORAL DAILY
Status: DISCONTINUED | OUTPATIENT
Start: 2021-05-11 | End: 2021-05-12

## 2021-05-11 RX ORDER — ACETAMINOPHEN 325 MG/1
650 TABLET ORAL EVERY 4 HOURS PRN
Status: DISCONTINUED | OUTPATIENT
Start: 2021-05-11 | End: 2021-05-13 | Stop reason: HOSPADM

## 2021-05-11 RX ORDER — ONDANSETRON 2 MG/ML
4 INJECTION INTRAMUSCULAR; INTRAVENOUS EVERY 6 HOURS PRN
Status: DISCONTINUED | OUTPATIENT
Start: 2021-05-11 | End: 2021-05-13 | Stop reason: HOSPADM

## 2021-05-11 RX ORDER — PAROXETINE HYDROCHLORIDE 20 MG/1
40 TABLET, FILM COATED ORAL EVERY MORNING
Status: DISCONTINUED | OUTPATIENT
Start: 2021-05-11 | End: 2021-05-13 | Stop reason: HOSPADM

## 2021-05-11 RX ORDER — ALBUTEROL SULFATE 2.5 MG/3ML
2.5 SOLUTION RESPIRATORY (INHALATION) EVERY 6 HOURS PRN
Status: DISCONTINUED | OUTPATIENT
Start: 2021-05-11 | End: 2021-05-13 | Stop reason: HOSPADM

## 2021-05-11 RX ORDER — PRAMIPEXOLE DIHYDROCHLORIDE 1.5 MG/1
3 TABLET ORAL NIGHTLY
Status: DISCONTINUED | OUTPATIENT
Start: 2021-05-11 | End: 2021-05-13 | Stop reason: HOSPADM

## 2021-05-11 RX ORDER — FERROUS SULFATE 325(65) MG
325 TABLET ORAL 2 TIMES DAILY WITH MEALS
Status: DISCONTINUED | OUTPATIENT
Start: 2021-05-11 | End: 2021-05-13 | Stop reason: HOSPADM

## 2021-05-11 RX ORDER — SODIUM CHLORIDE 0.9 % (FLUSH) 0.9 %
10 SYRINGE (ML) INJECTION AS NEEDED
Status: DISCONTINUED | OUTPATIENT
Start: 2021-05-11 | End: 2021-05-13 | Stop reason: HOSPADM

## 2021-05-11 RX ORDER — FUROSEMIDE 20 MG/1
20 TABLET ORAL DAILY
Status: DISCONTINUED | OUTPATIENT
Start: 2021-05-11 | End: 2021-05-12

## 2021-05-11 RX ORDER — PANTOPRAZOLE SODIUM 40 MG/1
40 TABLET, DELAYED RELEASE ORAL
Status: DISCONTINUED | OUTPATIENT
Start: 2021-05-11 | End: 2021-05-13 | Stop reason: HOSPADM

## 2021-05-11 RX ORDER — ARFORMOTEROL TARTRATE 15 UG/2ML
15 SOLUTION RESPIRATORY (INHALATION)
Status: DISCONTINUED | OUTPATIENT
Start: 2021-05-11 | End: 2021-05-13 | Stop reason: HOSPADM

## 2021-05-11 RX ORDER — CEFTRIAXONE SODIUM 1 G/50ML
1 INJECTION, SOLUTION INTRAVENOUS EVERY 24 HOURS
Status: DISCONTINUED | OUTPATIENT
Start: 2021-05-11 | End: 2021-05-13 | Stop reason: HOSPADM

## 2021-05-11 RX ORDER — ONDANSETRON 4 MG/1
4 TABLET, FILM COATED ORAL EVERY 6 HOURS PRN
Status: DISCONTINUED | OUTPATIENT
Start: 2021-05-11 | End: 2021-05-13 | Stop reason: HOSPADM

## 2021-05-11 RX ORDER — ALUMINA, MAGNESIA, AND SIMETHICONE 2400; 2400; 240 MG/30ML; MG/30ML; MG/30ML
15 SUSPENSION ORAL EVERY 6 HOURS PRN
Status: DISCONTINUED | OUTPATIENT
Start: 2021-05-11 | End: 2021-05-13 | Stop reason: HOSPADM

## 2021-05-11 RX ADMIN — FERROUS SULFATE TAB 325 MG (65 MG ELEMENTAL FE) 325 MG: 325 (65 FE) TAB at 17:09

## 2021-05-11 RX ADMIN — METOPROLOL TARTRATE 25 MG: 25 TABLET, FILM COATED ORAL at 20:22

## 2021-05-11 RX ADMIN — CEFTRIAXONE SODIUM 1 G: 1 INJECTION, SOLUTION INTRAVENOUS at 15:44

## 2021-05-11 RX ADMIN — IPRATROPIUM BROMIDE 0.5 MG: 0.5 SOLUTION RESPIRATORY (INHALATION) at 15:12

## 2021-05-11 RX ADMIN — PANTOPRAZOLE SODIUM 40 MG: 40 TABLET, DELAYED RELEASE ORAL at 17:09

## 2021-05-11 RX ADMIN — SODIUM CHLORIDE, PRESERVATIVE FREE 10 ML: 5 INJECTION INTRAVENOUS at 20:23

## 2021-05-11 RX ADMIN — IPRATROPIUM BROMIDE 0.5 MG: 0.5 SOLUTION RESPIRATORY (INHALATION) at 12:15

## 2021-05-11 RX ADMIN — PRAMIPEXOLE DIHYDROCHLORIDE 3 MG: 1.5 TABLET ORAL at 20:22

## 2021-05-11 RX ADMIN — ARFORMOTEROL TARTRATE 15 MCG: 15 SOLUTION RESPIRATORY (INHALATION) at 12:15

## 2021-05-11 RX ADMIN — IPRATROPIUM BROMIDE 0.5 MG: 0.5 SOLUTION RESPIRATORY (INHALATION) at 19:23

## 2021-05-11 RX ADMIN — PAROXETINE HYDROCHLORIDE HEMIHYDRATE 40 MG: 20 TABLET, FILM COATED ORAL at 20:22

## 2021-05-11 RX ADMIN — ARFORMOTEROL TARTRATE 15 MCG: 15 SOLUTION RESPIRATORY (INHALATION) at 19:23

## 2021-05-11 RX ADMIN — IOPAMIDOL 100 ML: 755 INJECTION, SOLUTION INTRAVENOUS at 02:37

## 2021-05-12 PROBLEM — N18.2 CKD (CHRONIC KIDNEY DISEASE) STAGE 2, GFR 60-89 ML/MIN: Status: ACTIVE | Noted: 2021-05-12

## 2021-05-12 LAB
ALBUMIN SERPL-MCNC: 3.3 G/DL (ref 3.5–5.2)
ALBUMIN/GLOB SERPL: 1 G/DL
ALP SERPL-CCNC: 118 U/L (ref 39–117)
ALT SERPL W P-5'-P-CCNC: 22 U/L (ref 1–41)
ANION GAP SERPL CALCULATED.3IONS-SCNC: 7.8 MMOL/L (ref 5–15)
AST SERPL-CCNC: 24 U/L (ref 1–40)
BILIRUB SERPL-MCNC: 0.7 MG/DL (ref 0–1.2)
BUN SERPL-MCNC: 17 MG/DL (ref 8–23)
BUN/CREAT SERPL: 11 (ref 7–25)
CALCIUM SPEC-SCNC: 8.2 MG/DL (ref 8.6–10.5)
CHLORIDE SERPL-SCNC: 100 MMOL/L (ref 98–107)
CO2 SERPL-SCNC: 24.2 MMOL/L (ref 22–29)
CREAT SERPL-MCNC: 1.54 MG/DL (ref 0.76–1.27)
DEPRECATED RDW RBC AUTO: 47 FL (ref 37–54)
ERYTHROCYTE [DISTWIDTH] IN BLOOD BY AUTOMATED COUNT: 14.5 % (ref 12.3–15.4)
GFR SERPL CREATININE-BSD FRML MDRD: 45 ML/MIN/1.73
GLOBULIN UR ELPH-MCNC: 3.4 GM/DL
GLUCOSE SERPL-MCNC: 152 MG/DL (ref 65–99)
HCT VFR BLD AUTO: 34.7 % (ref 37.5–51)
HGB BLD-MCNC: 11.7 G/DL (ref 13–17.7)
MCH RBC QN AUTO: 30.3 PG (ref 26.6–33)
MCHC RBC AUTO-ENTMCNC: 33.7 G/DL (ref 31.5–35.7)
MCV RBC AUTO: 89.9 FL (ref 79–97)
PLATELET # BLD AUTO: 134 10*3/MM3 (ref 140–450)
PMV BLD AUTO: 9.3 FL (ref 6–12)
POTASSIUM SERPL-SCNC: 4 MMOL/L (ref 3.5–5.2)
PROT SERPL-MCNC: 6.7 G/DL (ref 6–8.5)
RBC # BLD AUTO: 3.86 10*6/MM3 (ref 4.14–5.8)
SODIUM SERPL-SCNC: 132 MMOL/L (ref 136–145)
WBC # BLD AUTO: 4.95 10*3/MM3 (ref 3.4–10.8)

## 2021-05-12 PROCEDURE — 97116 GAIT TRAINING THERAPY: CPT

## 2021-05-12 PROCEDURE — 97110 THERAPEUTIC EXERCISES: CPT

## 2021-05-12 PROCEDURE — 80053 COMPREHEN METABOLIC PANEL: CPT | Performed by: NURSE PRACTITIONER

## 2021-05-12 PROCEDURE — 94799 UNLISTED PULMONARY SVC/PX: CPT

## 2021-05-12 PROCEDURE — 25010000002 CEFTRIAXONE PER 250 MG: Performed by: NURSE PRACTITIONER

## 2021-05-12 PROCEDURE — 97535 SELF CARE MNGMENT TRAINING: CPT

## 2021-05-12 PROCEDURE — 85027 COMPLETE CBC AUTOMATED: CPT | Performed by: NURSE PRACTITIONER

## 2021-05-12 PROCEDURE — 94640 AIRWAY INHALATION TREATMENT: CPT

## 2021-05-12 PROCEDURE — G0378 HOSPITAL OBSERVATION PER HR: HCPCS

## 2021-05-12 PROCEDURE — 51798 US URINE CAPACITY MEASURE: CPT

## 2021-05-12 PROCEDURE — 99214 OFFICE O/P EST MOD 30 MIN: CPT | Performed by: NURSE PRACTITIONER

## 2021-05-12 PROCEDURE — 96366 THER/PROPH/DIAG IV INF ADDON: CPT

## 2021-05-12 PROCEDURE — 97530 THERAPEUTIC ACTIVITIES: CPT

## 2021-05-12 RX ADMIN — FERROUS SULFATE TAB 325 MG (65 MG ELEMENTAL FE) 325 MG: 325 (65 FE) TAB at 10:03

## 2021-05-12 RX ADMIN — FERROUS SULFATE TAB 325 MG (65 MG ELEMENTAL FE) 325 MG: 325 (65 FE) TAB at 18:53

## 2021-05-12 RX ADMIN — METOPROLOL TARTRATE 25 MG: 25 TABLET, FILM COATED ORAL at 10:03

## 2021-05-12 RX ADMIN — PANTOPRAZOLE SODIUM 40 MG: 40 TABLET, DELAYED RELEASE ORAL at 18:53

## 2021-05-12 RX ADMIN — PAROXETINE HYDROCHLORIDE HEMIHYDRATE 40 MG: 20 TABLET, FILM COATED ORAL at 22:51

## 2021-05-12 RX ADMIN — IPRATROPIUM BROMIDE 0.5 MG: 0.5 SOLUTION RESPIRATORY (INHALATION) at 19:46

## 2021-05-12 RX ADMIN — CEFTRIAXONE SODIUM 1 G: 1 INJECTION, SOLUTION INTRAVENOUS at 15:20

## 2021-05-12 RX ADMIN — APIXABAN 5 MG: 5 TABLET, FILM COATED ORAL at 10:03

## 2021-05-12 RX ADMIN — IPRATROPIUM BROMIDE 0.5 MG: 0.5 SOLUTION RESPIRATORY (INHALATION) at 07:12

## 2021-05-12 RX ADMIN — PANTOPRAZOLE SODIUM 40 MG: 40 TABLET, DELAYED RELEASE ORAL at 06:08

## 2021-05-12 RX ADMIN — IPRATROPIUM BROMIDE 0.5 MG: 0.5 SOLUTION RESPIRATORY (INHALATION) at 10:50

## 2021-05-12 RX ADMIN — ARFORMOTEROL TARTRATE 15 MCG: 15 SOLUTION RESPIRATORY (INHALATION) at 19:39

## 2021-05-12 RX ADMIN — PRAMIPEXOLE DIHYDROCHLORIDE 3 MG: 1.5 TABLET ORAL at 20:40

## 2021-05-12 RX ADMIN — METOPROLOL TARTRATE 25 MG: 25 TABLET, FILM COATED ORAL at 20:40

## 2021-05-12 RX ADMIN — ARFORMOTEROL TARTRATE 15 MCG: 15 SOLUTION RESPIRATORY (INHALATION) at 07:11

## 2021-05-12 RX ADMIN — APIXABAN 5 MG: 5 TABLET, FILM COATED ORAL at 22:51

## 2021-05-12 RX ADMIN — APIXABAN 5 MG: 5 TABLET, FILM COATED ORAL at 01:25

## 2021-05-13 ENCOUNTER — READMISSION MANAGEMENT (OUTPATIENT)
Dept: CALL CENTER | Facility: HOSPITAL | Age: 71
End: 2021-05-13

## 2021-05-13 VITALS
DIASTOLIC BLOOD PRESSURE: 60 MMHG | TEMPERATURE: 98.1 F | WEIGHT: 202 LBS | HEART RATE: 69 BPM | RESPIRATION RATE: 18 BRPM | BODY MASS INDEX: 23.85 KG/M2 | SYSTOLIC BLOOD PRESSURE: 112 MMHG | HEIGHT: 77 IN | OXYGEN SATURATION: 98 %

## 2021-05-13 PROBLEM — Z16.12 URINARY TRACT INFECTION DUE TO EXTENDED-SPECTRUM BETA LACTAMASE (ESBL) PRODUCING ESCHERICHIA COLI: Status: ACTIVE | Noted: 2021-05-11

## 2021-05-13 PROBLEM — B96.29 URINARY TRACT INFECTION DUE TO EXTENDED-SPECTRUM BETA LACTAMASE (ESBL) PRODUCING ESCHERICHIA COLI: Status: ACTIVE | Noted: 2021-05-11

## 2021-05-13 LAB
ALBUMIN SERPL-MCNC: 3 G/DL (ref 3.5–5.2)
ALBUMIN/GLOB SERPL: 0.9 G/DL
ALP SERPL-CCNC: 114 U/L (ref 39–117)
ALT SERPL W P-5'-P-CCNC: 18 U/L (ref 1–41)
ANION GAP SERPL CALCULATED.3IONS-SCNC: 10.9 MMOL/L (ref 5–15)
AST SERPL-CCNC: 17 U/L (ref 1–40)
BACTERIA SPEC AEROBE CULT: ABNORMAL
BILIRUB SERPL-MCNC: 0.4 MG/DL (ref 0–1.2)
BUN SERPL-MCNC: 20 MG/DL (ref 8–23)
BUN/CREAT SERPL: 13.8 (ref 7–25)
CALCIUM SPEC-SCNC: 8.3 MG/DL (ref 8.6–10.5)
CHLORIDE SERPL-SCNC: 101 MMOL/L (ref 98–107)
CO2 SERPL-SCNC: 23.1 MMOL/L (ref 22–29)
CREAT SERPL-MCNC: 1.45 MG/DL (ref 0.76–1.27)
DEPRECATED RDW RBC AUTO: 48.9 FL (ref 37–54)
ERYTHROCYTE [DISTWIDTH] IN BLOOD BY AUTOMATED COUNT: 14.5 % (ref 12.3–15.4)
GFR SERPL CREATININE-BSD FRML MDRD: 48 ML/MIN/1.73
GLOBULIN UR ELPH-MCNC: 3.5 GM/DL
GLUCOSE SERPL-MCNC: 110 MG/DL (ref 65–99)
HCT VFR BLD AUTO: 33.5 % (ref 37.5–51)
HGB BLD-MCNC: 11.1 G/DL (ref 13–17.7)
MCH RBC QN AUTO: 30.3 PG (ref 26.6–33)
MCHC RBC AUTO-ENTMCNC: 33.1 G/DL (ref 31.5–35.7)
MCV RBC AUTO: 91.5 FL (ref 79–97)
PLATELET # BLD AUTO: 134 10*3/MM3 (ref 140–450)
PMV BLD AUTO: 9.8 FL (ref 6–12)
POTASSIUM SERPL-SCNC: 4.1 MMOL/L (ref 3.5–5.2)
PROT SERPL-MCNC: 6.5 G/DL (ref 6–8.5)
RBC # BLD AUTO: 3.66 10*6/MM3 (ref 4.14–5.8)
SODIUM SERPL-SCNC: 135 MMOL/L (ref 136–145)
WBC # BLD AUTO: 4.3 10*3/MM3 (ref 3.4–10.8)

## 2021-05-13 PROCEDURE — G0378 HOSPITAL OBSERVATION PER HR: HCPCS

## 2021-05-13 PROCEDURE — 80053 COMPREHEN METABOLIC PANEL: CPT | Performed by: NURSE PRACTITIONER

## 2021-05-13 PROCEDURE — 94799 UNLISTED PULMONARY SVC/PX: CPT

## 2021-05-13 PROCEDURE — 85027 COMPLETE CBC AUTOMATED: CPT | Performed by: NURSE PRACTITIONER

## 2021-05-13 PROCEDURE — 51798 US URINE CAPACITY MEASURE: CPT

## 2021-05-13 RX ORDER — FUROSEMIDE 20 MG/1
20 TABLET ORAL DAILY
Start: 2021-05-14 | End: 2021-08-02

## 2021-05-13 RX ORDER — POTASSIUM CHLORIDE 750 MG/1
10 CAPSULE, EXTENDED RELEASE ORAL DAILY
Qty: 30 CAPSULE | Refills: 3
Start: 2021-05-14 | End: 2021-05-27

## 2021-05-13 RX ORDER — DOXYCYCLINE 100 MG/1
100 CAPSULE ORAL 2 TIMES DAILY
Qty: 14 CAPSULE | Refills: 0 | Status: SHIPPED | OUTPATIENT
Start: 2021-05-13 | End: 2021-08-06

## 2021-05-13 RX ADMIN — METOPROLOL TARTRATE 25 MG: 25 TABLET, FILM COATED ORAL at 08:07

## 2021-05-13 RX ADMIN — IPRATROPIUM BROMIDE 0.5 MG: 0.5 SOLUTION RESPIRATORY (INHALATION) at 11:09

## 2021-05-13 RX ADMIN — PANTOPRAZOLE SODIUM 40 MG: 40 TABLET, DELAYED RELEASE ORAL at 06:40

## 2021-05-13 RX ADMIN — FERROUS SULFATE TAB 325 MG (65 MG ELEMENTAL FE) 325 MG: 325 (65 FE) TAB at 08:07

## 2021-05-13 RX ADMIN — APIXABAN 5 MG: 5 TABLET, FILM COATED ORAL at 11:47

## 2021-05-13 RX ADMIN — ARFORMOTEROL TARTRATE 15 MCG: 15 SOLUTION RESPIRATORY (INHALATION) at 07:02

## 2021-05-13 NOTE — OUTREACH NOTE
Prep Survey      Responses   [facility] patient discharged from?  [place]   Is LACE score < 7 ?  No   Emergency Room discharge w/ pulse ox?  No   Eligibility  [facility]   Date of Admission  05/10/21   Date of Discharge  05/13/21   Discharge Disposition  Home or Self Care   Discharge diagnosis  Pleuritic chest pain,    Urinary tract infection    Does the patient have one of the following disease processes/diagnoses(primary or secondary)?  Other   Does the patient have Home health ordered?  No   Is there a DME ordered?  No   Prep survey completed?  Yes          Kalina Felipe RN

## 2021-05-14 ENCOUNTER — TRANSITIONAL CARE MANAGEMENT TELEPHONE ENCOUNTER (OUTPATIENT)
Dept: CALL CENTER | Facility: HOSPITAL | Age: 71
End: 2021-05-14

## 2021-05-14 NOTE — OUTREACH NOTE
Call Center TCM Note      Responses   [facility] patient discharged from?  [place]   Does the patient have one of the following disease processes/diagnoses(primary or secondary)?  Other   TCM attempt successful?  No [Lena-spouse]   Unsuccessful attempts  Attempt 1   Does the patient have a primary care provider?   Yes   Does the patient have an appointment with their PCP within 7 days of discharge?  Yes   Comments regarding PCP  [facility]P [facility] appt on 5/19/21 at 09:45   Has the patient kept scheduled appointments due by today?  N/A          Vivian Ewing RN    5/14/2021, 09:32 EDT

## 2021-05-14 NOTE — OUTREACH NOTE
Call Center TCM Note      Responses   [facility] patient discharged from?  [place]   Does the patient have one of the following disease processes/diagnoses(primary or secondary)?  Other   TCM attempt successful?  No   Unsuccessful attempts  Attempt 2          Vivian Ewing RN    5/14/2021, 10:55 EDT

## 2021-05-17 ENCOUNTER — TRANSITIONAL CARE MANAGEMENT TELEPHONE ENCOUNTER (OUTPATIENT)
Dept: CALL CENTER | Facility: HOSPITAL | Age: 71
End: 2021-05-17

## 2021-05-17 NOTE — OUTREACH NOTE
Call Center TCM Note      Responses   [facility] patient discharged from?  [place]   Does the patient have one of the following disease processes/diagnoses(primary or secondary)?  Other   TCM attempt successful?  No   Unsuccessful attempts  Attempt 3          Yenifer Meza RN    5/17/2021, 13:43 EDT

## 2021-05-19 ENCOUNTER — OFFICE VISIT (OUTPATIENT)
Dept: INTERNAL MEDICINE | Age: 71
End: 2021-05-19

## 2021-05-19 VITALS
HEART RATE: 68 BPM | WEIGHT: 208.8 LBS | TEMPERATURE: 97.8 F | OXYGEN SATURATION: 98 % | BODY MASS INDEX: 24.65 KG/M2 | HEIGHT: 77 IN | DIASTOLIC BLOOD PRESSURE: 64 MMHG | SYSTOLIC BLOOD PRESSURE: 124 MMHG

## 2021-05-19 DIAGNOSIS — R79.89 INCREASE IN CREATININE: ICD-10-CM

## 2021-05-19 DIAGNOSIS — A49.9 ESBL (EXTENDED SPECTRUM BETA-LACTAMASE) PRODUCING BACTERIA INFECTION: ICD-10-CM

## 2021-05-19 DIAGNOSIS — Z16.12 ESBL (EXTENDED SPECTRUM BETA-LACTAMASE) PRODUCING BACTERIA INFECTION: ICD-10-CM

## 2021-05-19 DIAGNOSIS — Z09 HOSPITAL DISCHARGE FOLLOW-UP: Primary | ICD-10-CM

## 2021-05-19 DIAGNOSIS — E83.51 HYPOCALCEMIA: ICD-10-CM

## 2021-05-19 LAB
25(OH)D3+25(OH)D2 SERPL-MCNC: 25.4 NG/ML (ref 30–100)
ALBUMIN SERPL-MCNC: 3.7 G/DL (ref 3.5–5.2)
ALBUMIN/GLOB SERPL: 1.4 G/DL
ALP SERPL-CCNC: 125 U/L (ref 39–117)
ALT SERPL-CCNC: 16 U/L (ref 1–41)
AST SERPL-CCNC: 18 U/L (ref 1–40)
BILIRUB SERPL-MCNC: 0.3 MG/DL (ref 0–1.2)
BUN SERPL-MCNC: 22 MG/DL (ref 8–23)
BUN/CREAT SERPL: 13.8 (ref 7–25)
CALCIUM SERPL-MCNC: 9.2 MG/DL (ref 8.6–10.5)
CHLORIDE SERPL-SCNC: 109 MMOL/L (ref 98–107)
CO2 SERPL-SCNC: 24.2 MMOL/L (ref 22–29)
CREAT SERPL-MCNC: 1.59 MG/DL (ref 0.76–1.27)
GLOBULIN SER CALC-MCNC: 2.7 GM/DL
GLUCOSE SERPL-MCNC: 104 MG/DL (ref 65–99)
POTASSIUM SERPL-SCNC: 4.4 MMOL/L (ref 3.5–5.2)
PROT SERPL-MCNC: 6.4 G/DL (ref 6–8.5)
SODIUM SERPL-SCNC: 140 MMOL/L (ref 136–145)

## 2021-05-19 PROCEDURE — 99214 OFFICE O/P EST MOD 30 MIN: CPT | Performed by: INTERNAL MEDICINE

## 2021-05-19 PROCEDURE — 96372 THER/PROPH/DIAG INJ SC/IM: CPT | Performed by: INTERNAL MEDICINE

## 2021-05-19 RX ADMIN — CYANOCOBALAMIN 1000 MCG: 1000 INJECTION, SOLUTION INTRAMUSCULAR; SUBCUTANEOUS at 11:22

## 2021-05-19 NOTE — PROGRESS NOTES
"[facility] INTERNAL MEDICINE  RICHARDSON BOLTON M.D.    Jose LITTLE Hurtado / 71 y.o. / male  05/19/2021      CC:   Hospital Follow Up Visit (05.10.21 Chest pain, pleural effusion )      VITALS    Visit Vitals  /64   Pulse 68   Temp 97.8 °F (36.6 °C)   Ht 195.6 cm (77.01\")   Wt 94.7 kg (208 lb 12.8 oz)   SpO2 98%   BMI 24.75 kg/m²       BP Readings from Last 3 Encounters:   05/19/21 124/64   05/13/21 112/60   04/13/21 133/68     Wt Readings from Last 3 Encounters:   05/19/21 94.7 kg (208 lb 12.8 oz)   05/11/21 91.6 kg (202 lb)   04/13/21 99.4 kg (219 lb 3.2 oz)      Body mass index is 24.75 kg/m².    HPI:     Date of admission/discharge: As noted above in CC  Hospital: [facility]  Principle Dx: Atypical chest/abdominal pain ; UTI with ESBL E. Coli infection  Secondary Dx: Increased creatinine level above baseline  History prior to hospitalization: Atypical chest pain and upper abdominal pain  Evaluation/Treatment: Ruled out for MI and PE. Seen by cardiologist and urologist. Started on doxycycline. Noted to have a bump in creatinine and lasix held for few days.   Course: Denies any fever or chills. Denies gross hematuria, flank pain, or dysuria. Denies recurrent abdominal / chest pain.     Patient Care Team:  Brandin Bolton MD as PCP - General (Internal Medicine)  George Phelan II, MD as Consulting Physician (Hematology and Oncology)  Jose Inman MD as Consulting Physician (Urology)  Mulugeta Millan MD as Consulting Physician (Gastroenterology)  Jose Christine III, MD as Referring Physician (Thoracic Surgery)  Seth Randhawa MD as Consulting Physician (Ophthalmology)  James Cyr MD as Consulting Physician (Cardiology)  ____________________________________________________________________    ASSESSMENT & PLAN:    1. Hospital discharge follow-up    2. ESBL (extended spectrum beta-lactamase) producing bacteria infection    3. Increase in creatinine    4. Hypocalcemia      Orders Placed " This Encounter   Procedures   • Comprehensive Metabolic Panel   • Vitamin D 25 Hydroxy       Summary/Discussion:  • Doing better. Complete antibiotic course of doxycycline.   • No recurrent chest or abdominal pain.   • Follow-up with cardiologist and urology as instructed.   • Check CMP today. Check vitamin D level for chronic hypocalcemia.   • Follow-up 2 months.     Return in about 2 months (around 7/19/2021) for Reassess chronic medical problems.    ____________________________________________________________________    REVIEW OF SYSTEMS    Review of Systems  No fever or chills  No chest pain pain or shortness of breath  GI neg for pain, melena or bleeding   neg for gross hematuria or dysuria     PHYSICAL EXAMINATION    Physical Exam  No acute distress   Cardiovascular: Normal rate, regular rhythm.   Pulm/Chest: Effort normal, breath sounds normal.   Abdomen: Soft and nontender.   1+ bilateral lower extremities edema (wearing compressions)    REVIEWED DATA:    Labs:   Lab Results   Component Value Date     (L) 05/13/2021    K 4.1 05/13/2021    CALCIUM 8.3 (L) 05/13/2021    AST 17 05/13/2021    ALT 18 05/13/2021    BUN 20 05/13/2021    CREATININE 1.45 (H) 05/13/2021    CREATININE 1.54 (H) 05/12/2021    CREATININE 1.39 (H) 05/11/2021    EGFRIFNONA 48 (L) 05/13/2021    EGFRIFAFRI 97 05/10/2018     No results found for: [id number]     Lab Results   Component Value Date    WBC 4.30 05/13/2021    HGB 11.1 (L) 05/13/2021    HGB 11.7 (L) 05/12/2021    HGB 10.6 (L) 05/11/2021     (L) 05/13/2021       Lab Results   Component Value Date    GLUCOSEU Negative 05/11/2021    BLOODU Large (3+) (A) 05/11/2021    NITRITEU Negative 05/11/2021    LEUKOCYTESUR Large (3+) (A) 05/11/2021       Imaging:   CT Abdomen Pelvis Without Contrast    Result Date: 5/12/2021  Narrative: CT ABDOMEN AND PELVIS WITHOUT CONTRAST  HISTORY: Epigastric pain.  TECHNIQUE: Axial CT images of the abdomen and pelvis were obtained without  administration of intravenous contrast. The patient was not given oral contrast. Coronal and sagittal reformats were obtained.  COMPARISON: CT abdomen and pelvis from 03/17/2020.  FINDINGS: Contrast is seen within bilateral renal collecting systems from recent CT angiogram of the chest. There is mild bilateral hydroureteronephrosis. The dilated ureters are seen to the level of their insertion at the UVJ. The urinary bladder is moderately distended with mild circumferential wall thickening. There is a slightly striated appearance of the bilateral kidneys which may be a normal variant however correlation with urine exam is recommended to rule out pyelonephritis. There is postoperative changes from esophagectomy with gastric pull-through again demonstrated. Small loculated right pleural effusion is again demonstrated.  Noncontrast attenuation of the liver is normal. The spleen, pancreas, gallbladder and bilateral adrenal glands are unchanged. No evidence of bowel obstruction.      Impression: 1. There is bilateral mild hydroureteronephrosis. The ureters are dilated to the level of the UVJ with a moderately distended urinary bladder demonstrating circumferential wall thickening. The hydronephrosis is likely related to bladder distention. Consider urinary catheter placement and repeat sonogram to evaluate for resolution. 2. Mild striated appearance of the bilateral kidneys which may be a normal variant, however correlation with urine exam to rule out infection/pyelonephritis.  Radiation dose reduction techniques were utilized, including automated exposure control and exposure modulation based on body size.  This report was finalized on 5/12/2021 1:27 PM by Dr. Clemente May M.D.      XR Chest 1 View    Result Date: 5/11/2021  Narrative: Patient: JJ BOURGEOIS  Time Out: 00:44 Exam(s): FILM CXR 1 VIEW EXAM:   XR Chest, 1 View CLINICAL HISTORY:    Chest pain  Reason for exam: Chest pain. TECHNIQUE:   Frontal view of the  chest. COMPARISON:   Chest x-ray dated 12 15 2020 FINDINGS:   Lungs:  Hyperinflated lungs suggesting COPD.  Scattered reticular and groundglass opacities seen predominantly within the right lung which may represent scarring versus infection.   Pleural space:  Trace right pleural effusion.   Heart:  Unremarkable.   Mediastinum:  Unremarkable.   Bones joints:  Unremarkable. IMPRESSION:     1.  Hyperinflated lungs suggesting COPD.  Scattered reticular and groundglass opacities seen predominantly within the right lung which may represent scarring versus infection. 2.  Trace right pleural effusion.     Impression: Electronically signed by Carloz Delacruz MD on 05-11-21 at 0044    CT Angiogram Chest    Result Date: 5/11/2021  Narrative: Patient: JJ BOURGEOIS  Time Out: 03:07 Exam(s): CTA CHEST EXAM:   CT Angiography Chest With Intravenous Contrast CLINICAL HISTORY:    History of esophageal cancer, pleuritic chest pain, near syncope, elevated troponin, rule out PE  Reason for exam: History of esophageal cancer, pleuritic chest pain, near syncope, elevated troponin, rule out PE. TECHNIQUE:   Axial computed tomographic angiography images of the chest with intravenous contrast.  CTDI is 14.8 mGy and DLP is 701.6 mGy-cm.  This CT exam was performed according to the principle of ALARA (As Low As Reasonably Achievable) by using one or more of the following dose reduction techniques: automated exposure control, adjustment of the mA and or kV according to patient size, and or use of iterative reconstruction technique.   MIP reconstructed images were created and reviewed. COMPARISON:   3 17 2021 FINDINGS:   Pulmonary arteries:  No pulmonary embolus.   Aorta:  No acute findings.  No thoracic aortic aneurysm.   Lungs:  Atelectasis and scarring seen within the mid to lower right lung.  No acute findings.  No mass.   Pleural space:  Again seen is a small right loculated pleural effusion.  No pneumothorax.   Heart:  Trace pericardial  effusion.  No evidence of RV dysfunction.   Mediastinum:  Status post esophagectomy with gastric pull-through.  Stable shift of mediastinum to the right.  Grossly stable subcentimeter and pericentimeter mediastinal and hilar lymph nodes.   Bones joints:  No acute fracture.  No dislocation.   Soft tissues:  Unremarkable.   Lymph nodes:  See above.   Kidneys and ureters:  Mild bilateral Hydro ureteronephrosis which is incompletely visualized.  Nonobstructing calculus within left kidney. IMPRESSION:     1.  No pulmonary embolus. 2.  Status post esophagectomy with gastric pull-through. 3.  Mild bilateral hydro-ureteronephrosis which is incompletely visualized. Consider CT of the abdomen and pelvis. 4.  Atelectasis and scarring seen within the mid to lower right lung.  No acute findings. 5.  Again seen is a small right loculated pleural effusion.     Impression: Electronically signed by Carloz Delacruz MD on 05-11-21 at 0307       Medical Tests:        Summary of old records / correspondence / consultant report:   DC summary re: issues addressed on HPI    Request outside records:       ALLERGIES  No Known Allergies     MEDICATIONS   Current Outpatient Medications   Medication Sig Dispense Refill   • albuterol sulfate  (90 Base) MCG/ACT inhaler Inhale 1 puff Every 4 (Four) Hours As Needed for Wheezing.     • doxycycline (MONODOX) 100 MG capsule Take 1 capsule by mouth 2 (Two) Times a Day. 14 capsule 0   • Eliquis 5 MG tablet tablet TAKE ONE TABLET BY MOUTH EVERY 12 HOURS 180 tablet 1   • ferrous sulfate 325 (65 FE) MG tablet Take 1 tablet by mouth 2 (Two) Times a Day With Meals. 30 tablet 1   • furosemide (LASIX) 20 MG tablet Take 1 tablet by mouth Daily.     • metoprolol tartrate (LOPRESSOR) 25 MG tablet Take 1 tablet by mouth 2 (Two) Times a Day. 180 tablet 3   • pantoprazole (PROTONIX) 40 MG EC tablet Take 1 tablet by mouth 2 (Two) Times a Day Before Meals. 180 tablet 3   • PARoxetine (PAXIL) 40 MG tablet TAKE  ONE TABLET BY MOUTH EVERY MORNING (Patient taking differently: Take 40 mg by mouth Every Morning.) 30 tablet 5   • potassium chloride (MICRO-K) 10 MEQ CR capsule Take 1 capsule by mouth Daily. 30 capsule 3   • pramipexole (MIRAPEX) 1.5 MG tablet TAKE ONE TABLET BY MOUTH TWICE A DAY (Patient taking differently: 3 mg Every Night. 2 tabs of 1.5 mg) 180 tablet 1   • STIOLTO RESPIMAT 2.5-2.5 MCG/ACT aerosol solution inhaler Inhale 2 puffs Daily.       Current Facility-Administered Medications   Medication Dose Route Frequency Provider Last Rate Last Admin   • cyanocobalamin injection 1,000 mcg  1,000 mcg Intramuscular Q28 Days Brandin Bolton MD   1,000 mcg at 04/26/21 1017       Current outpatient and discharge medications have been reconciled for the patient.  Reviewed by: Brandin Bolton MD       [facility]:     The following portions of the patient's history were reviewed and updated as appropriate: Allergies / Current Medications / Past Medical History / Surgical History / Social History / Family History    PROBLEM LIST   Patient Active Problem List   Diagnosis   • Adenocarcinoma of esophagus (CMS/HCC)   • Adenomatous polyp of colon   • Malignant neoplasm of prostate (CMS/HCC)   • RLS (restless legs syndrome)   • History of DVT (deep vein thrombosis)   • Recurrent major depressive disorder, in partial remission (CMS/HCC)   • Hypertension   • Anemia   • Insomnia due to other mental disorder   • Peripheral polyneuropathy   • B12 deficiency   • Postsurgical malabsorption   • Lymphedema   • Iron deficiency   • Gastroparesis   • Acute deep vein thrombosis (DVT) of popliteal vein of left lower extremity (CMS/HCC)   • Tremor of both hands   • Gastrointestinal hemorrhage   • Acute blood loss anemia   • Pancytopenia (CMS/HCC)   • Chronic venous hypertension due to DVT   • COPD (chronic obstructive pulmonary disease) (CMS/HCC)   • Bleeding hemorrhoid   • Malabsorption of iron   • Iron deficiency anemia   • Pleuritic chest pain   • History  of esophageal cancer   • Abnormal CT scan   • Generalized weakness   • Chronic anticoagulation   • Urinary tract infection due to extended-spectrum beta lactamase (ESBL) producing Escherichia coli   • CKD (chronic kidney disease) stage 2, GFR 60-89 ml/min       PAST MEDICAL HISTORY  Past Medical History:   Diagnosis Date   • Acute deep vein thrombosis (DVT) of popliteal vein of left lower extremity (CMS/HCC) 03/24/2020   • Anemia    • Anti-phospholipid syndrome (CMS/HCC)     On lifelong anticoagulation therapy   • Bladder disorder     LEAKAGE   ON MED  wers pads   • Bleeding hemorrhoids    • Community acquired pneumonia     HISTORY OF IN 2014   • Deep venous thrombosis (CMS/HCC) 2006, 2008    Left lower extremity multiple   • Depression    • Esophageal carcinoma (CMS/HCC) 12/31/2014    had chemo and radiation prior surgery   • Hemorrhoids    • HH (hiatus hernia)    • History of atrial fibrillation 2015    ONE EPISODE WHILE HOSPITALIZED   • History of kidney stones    • History of nephrolithiasis    • History of pancreatitis     PT STATES MANY YEARS AGO   • History of radiation therapy    • History of transfusion    • Hypertension    • Long-term (current) use of anticoagulants, INR goal 2.0-3.0    • Lymphedema    • Malignant neoplasm of prostate (CMS/HCC)    • Other hyperlipidemia 1/30/2018 January 30, 2018 lipid panel risk 12.8%   • Restless legs syndrome    • Sleep apnea     OCCASIONALLY WEARS CPAP   • Squamous carcinoma     on the head       SURGICAL HISTORY  Past Surgical History:   Procedure Laterality Date   • APPENDECTOMY  1950   • BRONCHOSCOPY      (Diagnostic)   • CATARACT EXTRACTION Bilateral 2014   • COLONOSCOPY  12/15/2014    Complete / Description: EH, IH, torts, stool, follow-up colonoscopy due in 5 years.   • COLONOSCOPY N/A 6/13/2017    non-thrombosed external hemorrhoids, normal examined ileum, IH   • COLONOSCOPY N/A 2/26/2019    Procedure: COLONOSCOPY with Cold Polypectomy;  Surgeon: Yana  Mulugeta BLACKWELL MD;  Location: [facility] ENDOSCOPY;  Service: Gastroenterology   • COLONOSCOPY N/A 12/16/2020    Procedure: COLONOSCOPY to cecum into TI;  Surgeon: Mesha Sanchez MD;  Location: [facility] ENDOSCOPY;  Service: Gastroenterology;  Laterality: N/A;  pre: lower GI bleed  post: hemorrhoids    • CYSTOSCOPY W/ LASER LITHOTRIPSY     • ENDOSCOPY N/A 6/13/2017    Procedure: ESOPHAGOGASTRODUODENOSCOPY WITH COLD BIOPSY;  Surgeon: Lake Gonzalez MD;  Location: [facility] ENDOSCOPY;  Service:    • ESOPHAGECTOMY      April 2015, stage IIB esophageal carcinoma, sub-total resection.   • ESOPHAGECTOMY      Esophagectomy Subtotal Andrea Joe Procedure   • EXCISION LESION  08/2012    Removal of Squamous Cell CA on Head   • HAMMER TOE REPAIR  09/2014    Hammertoe Operation (Each Toe), 10/2014   • HAMMER TOE REPAIR Left 10/3/2017    Procedure: Left second third and fourth distal interphalangeal joint resection with flexor tenotomy;  Surgeon: Mulugeta Lira MD;  Location:  [facility];  Service:    • HEMORRHOIDECTOMY N/A 12/23/2020    Procedure: HEMORRHOIDECTOMY;  Surgeon: Billy Julio Jr., MD;  Location: [facility] MAIN OR;  Service: General;  Laterality: N/A;   • HERNIA REPAIR      incisional   • JEJUNOSTOMY      Laparoscopic   • JEJUNOSTOMY      tube removal    • KNEE SURGERY Bilateral 1967, 1973, 1981   • PATELLA SURGERY Left     removed   • PILONIDAL CYST / SINUS EXCISION     • RI TOTAL KNEE ARTHROPLASTY Right 3/26/2018    Procedure: TOTAL KNEE ARTHROPLASTY;  Surgeon: Renny Solis MD;  Location: [facility] MAIN OR;  Service: Orthopedics   • PROSTATECTOMY  2010   • PYLOROPLASTY     • SPINAL FUSION  02/1998    C 5,6   • TONSILLECTOMY     • UPPER GASTROINTESTINAL ENDOSCOPY  12/15/2014    LA Grade D esophagitis, Pardo's, HH, multiple duodenal ulcers   • VENTRAL/INCISIONAL HERNIA REPAIR N/A 4/14/2016    Procedure: VENTRAL/INCISIONAL HERNIA REPAIR, open, with mesh, and component separation;  Surgeon: Darren Rivas,  "MD;  Location: [facility] OR;  Service:        SOCIAL HISTORY  Social History     Socioeconomic History   • Marital status:      Spouse name: Lena   • Number of children: 0   • Years of education: College   • Highest education level: Not on file   Tobacco Use   • Smoking status: Former Smoker     Packs/day: 2.00     Years: 3.00     Pack years: 6.00     Types: Cigarettes     Quit date:      Years since quittin.4   • Smokeless tobacco: Former User     Types: Chew   • Tobacco comment: no caffeine    Vaping Use   • Vaping Use: Never used   Substance and Sexual Activity   • Alcohol use: Yes     Alcohol/week: 3.0 standard drinks     Types: 1 Glasses of wine, 1 Cans of beer, 1 Shots of liquor per week     Comment: 1-2 drinks/week   • Drug use: Not Currently     Types: Marijuana     Comment: hx marijuana use \"a long time ago\"   • Sexual activity: Defer       FAMILY HISTORY  Family History   Problem Relation Age of Onset   • Cerebral aneurysm Mother         cerebral artery aneurysm ( age 56)   • Prostate cancer Brother 68   • Anxiety disorder Father    • Suicide Attempts Father          of suicide   • Cancer Father         bladder   • No Known Problems Brother    • Pancreatic cancer Nephew    • Colon cancer Neg Hx    • Esophageal cancer Neg Hx    • Dementia Neg Hx    • Malig Hyperthermia Neg Hx        Examiner was wearing KN95 mask, face shield and exam gloves during the entire duration of the visit. Patient was masked the entire time.   Minimum social distance of 6 ft maintained entire visit except if physical contact was necessary as documented.     **Dragon Disclaimer:   Much of this encounter note is an electronic transcription/translation of spoken language to printed text. The electronic translation of spoken language may permit erroneous, or at times, nonsensical words or phrases to be inadvertently transcribed. Although I have reviewed the note for such errors, some may still exist. "       Template created by Moose Bolton MD

## 2021-05-20 NOTE — PROGRESS NOTES
Call patient with results:    1. Calcium level has improved.   2. Vitamin D is low. Start/increase vitamin D3 by 2000 IU daily. (ADD TO MED LIST for Dx vit D def)  3. Creatinine level (kidney function) is little worse. Verify optimal fluid intake. Avoid any NSAIDs. Recheck BMP in 2 weeks.

## 2021-05-21 DIAGNOSIS — R79.89 INCREASE IN CREATININE: Primary | ICD-10-CM

## 2021-05-21 RX ORDER — CHOLECALCIFEROL (VITAMIN D3) 125 MCG
2000 CAPSULE ORAL DAILY
COMMUNITY
End: 2021-09-14 | Stop reason: ALTCHOICE

## 2021-05-25 ENCOUNTER — READMISSION MANAGEMENT (OUTPATIENT)
Dept: CALL CENTER | Facility: HOSPITAL | Age: 71
End: 2021-05-25

## 2021-05-25 ENCOUNTER — LAB (OUTPATIENT)
Dept: OTHER | Facility: HOSPITAL | Age: 71
End: 2021-05-25

## 2021-05-25 DIAGNOSIS — C15.9 ADENOCARCINOMA OF ESOPHAGUS (HCC): ICD-10-CM

## 2021-05-25 LAB
BASOPHILS # BLD AUTO: 0.04 10*3/MM3 (ref 0–0.2)
BASOPHILS NFR BLD AUTO: 0.7 % (ref 0–1.5)
DEPRECATED RDW RBC AUTO: 53.7 FL (ref 37–54)
EOSINOPHIL # BLD AUTO: 0.22 10*3/MM3 (ref 0–0.4)
EOSINOPHIL NFR BLD AUTO: 3.8 % (ref 0.3–6.2)
ERYTHROCYTE [DISTWIDTH] IN BLOOD BY AUTOMATED COUNT: 15.1 % (ref 12.3–15.4)
FERRITIN SERPL-MCNC: 416.5 NG/ML (ref 30–400)
HCT VFR BLD AUTO: 34.7 % (ref 37.5–51)
HGB BLD-MCNC: 10.7 G/DL (ref 13–17.7)
HGB RETIC QN AUTO: 33.5 PG (ref 29.8–36.1)
IMM GRANULOCYTES # BLD AUTO: 0.02 10*3/MM3 (ref 0–0.05)
IMM GRANULOCYTES NFR BLD AUTO: 0.3 % (ref 0–0.5)
IMM RETICS NFR: 12.4 % (ref 3–15.8)
IRON 24H UR-MRATE: 66 MCG/DL (ref 59–158)
IRON SATN MFR SERPL: 25 % (ref 20–50)
LYMPHOCYTES # BLD AUTO: 1.51 10*3/MM3 (ref 0.7–3.1)
LYMPHOCYTES NFR BLD AUTO: 26.1 % (ref 19.6–45.3)
MCH RBC QN AUTO: 29.6 PG (ref 26.6–33)
MCHC RBC AUTO-ENTMCNC: 30.8 G/DL (ref 31.5–35.7)
MCV RBC AUTO: 95.9 FL (ref 79–97)
MONOCYTES # BLD AUTO: 0.33 10*3/MM3 (ref 0.1–0.9)
MONOCYTES NFR BLD AUTO: 5.7 % (ref 5–12)
NEUTROPHILS NFR BLD AUTO: 3.67 10*3/MM3 (ref 1.7–7)
NEUTROPHILS NFR BLD AUTO: 63.4 % (ref 42.7–76)
NRBC BLD AUTO-RTO: 0 /100 WBC (ref 0–0.2)
PLATELET # BLD AUTO: 194 10*3/MM3 (ref 140–450)
PMV BLD AUTO: 9.4 FL (ref 6–12)
RBC # BLD AUTO: 3.62 10*6/MM3 (ref 4.14–5.8)
RETICS # AUTO: 0.04 10*6/MM3 (ref 0.02–0.13)
RETICS/RBC NFR AUTO: 1.03 % (ref 0.7–1.9)
TIBC SERPL-MCNC: 267 MCG/DL (ref 298–536)
TRANSFERRIN SERPL-MCNC: 179 MG/DL (ref 200–360)
WBC # BLD AUTO: 5.79 10*3/MM3 (ref 3.4–10.8)

## 2021-05-25 PROCEDURE — 85046 RETICYTE/HGB CONCENTRATE: CPT | Performed by: INTERNAL MEDICINE

## 2021-05-25 PROCEDURE — 36415 COLL VENOUS BLD VENIPUNCTURE: CPT

## 2021-05-25 PROCEDURE — 82728 ASSAY OF FERRITIN: CPT | Performed by: INTERNAL MEDICINE

## 2021-05-25 PROCEDURE — 85025 COMPLETE CBC W/AUTO DIFF WBC: CPT | Performed by: INTERNAL MEDICINE

## 2021-05-25 PROCEDURE — 83540 ASSAY OF IRON: CPT | Performed by: INTERNAL MEDICINE

## 2021-05-25 PROCEDURE — 84466 ASSAY OF TRANSFERRIN: CPT | Performed by: INTERNAL MEDICINE

## 2021-05-25 NOTE — OUTREACH NOTE
Medical Week 2 Survey      Responses   [facility] patient discharged from?  [place]   Does the patient have one of the following disease processes/diagnoses(primary or secondary)?  Other   Week 2 attempt successful?  No   Unsuccessful attempts  Attempt 1   General alerts for this patient  .          Marge Reveles RN

## 2021-05-27 RX ORDER — POTASSIUM CHLORIDE 750 MG/1
CAPSULE, EXTENDED RELEASE ORAL
Qty: 30 CAPSULE | Refills: 5 | Status: SHIPPED | OUTPATIENT
Start: 2021-05-27 | End: 2022-06-23 | Stop reason: SDUPTHER

## 2021-06-01 ENCOUNTER — READMISSION MANAGEMENT (OUTPATIENT)
Dept: CALL CENTER | Facility: HOSPITAL | Age: 71
End: 2021-06-01

## 2021-06-01 NOTE — OUTREACH NOTE
Medical Week 2 Survey      Responses   [facility] patient discharged from?  [place]   Does the patient have one of the following disease processes/diagnoses(primary or secondary)?  Other   Week 2 attempt successful?  No   Unsuccessful attempts  Attempt 2          Mervat Rodriguez RN

## 2021-06-29 ENCOUNTER — CLINICAL SUPPORT (OUTPATIENT)
Dept: INTERNAL MEDICINE | Age: 71
End: 2021-06-29

## 2021-06-29 PROCEDURE — 96372 THER/PROPH/DIAG INJ SC/IM: CPT | Performed by: INTERNAL MEDICINE

## 2021-06-29 RX ADMIN — CYANOCOBALAMIN 1000 MCG: 1000 INJECTION, SOLUTION INTRAMUSCULAR; SUBCUTANEOUS at 10:12

## 2021-07-05 DIAGNOSIS — F32.9 MAJOR DEPRESSIVE DISORDER WITH CURRENT ACTIVE EPISODE, UNSPECIFIED DEPRESSION EPISODE SEVERITY, UNSPECIFIED WHETHER RECURRENT: ICD-10-CM

## 2021-07-06 ENCOUNTER — OFFICE VISIT (OUTPATIENT)
Dept: ONCOLOGY | Facility: CLINIC | Age: 71
End: 2021-07-06

## 2021-07-06 ENCOUNTER — LAB (OUTPATIENT)
Dept: OTHER | Facility: HOSPITAL | Age: 71
End: 2021-07-06

## 2021-07-06 VITALS
OXYGEN SATURATION: 96 % | DIASTOLIC BLOOD PRESSURE: 70 MMHG | WEIGHT: 218.4 LBS | SYSTOLIC BLOOD PRESSURE: 151 MMHG | TEMPERATURE: 97.3 F | RESPIRATION RATE: 18 BRPM | HEIGHT: 77 IN | BODY MASS INDEX: 25.79 KG/M2 | HEART RATE: 66 BPM

## 2021-07-06 DIAGNOSIS — C15.9 ADENOCARCINOMA OF ESOPHAGUS (HCC): ICD-10-CM

## 2021-07-06 DIAGNOSIS — C15.9 ADENOCARCINOMA OF ESOPHAGUS (HCC): Primary | ICD-10-CM

## 2021-07-06 LAB
BASOPHILS # BLD AUTO: 0.03 10*3/MM3 (ref 0–0.2)
BASOPHILS NFR BLD AUTO: 0.8 % (ref 0–1.5)
DEPRECATED RDW RBC AUTO: 53.4 FL (ref 37–54)
EOSINOPHIL # BLD AUTO: 0.41 10*3/MM3 (ref 0–0.4)
EOSINOPHIL NFR BLD AUTO: 10.4 % (ref 0.3–6.2)
ERYTHROCYTE [DISTWIDTH] IN BLOOD BY AUTOMATED COUNT: 14.7 % (ref 12.3–15.4)
FERRITIN SERPL-MCNC: 328.5 NG/ML (ref 30–400)
HCT VFR BLD AUTO: 34.8 % (ref 37.5–51)
HGB BLD-MCNC: 10.8 G/DL (ref 13–17.7)
HGB RETIC QN AUTO: 33.9 PG (ref 29.8–36.1)
IMM GRANULOCYTES # BLD AUTO: 0.01 10*3/MM3 (ref 0–0.05)
IMM GRANULOCYTES NFR BLD AUTO: 0.3 % (ref 0–0.5)
IMM RETICS NFR: 14.3 % (ref 3–15.8)
IRON 24H UR-MRATE: 65 MCG/DL (ref 59–158)
IRON SATN MFR SERPL: 21 % (ref 20–50)
LYMPHOCYTES # BLD AUTO: 1.28 10*3/MM3 (ref 0.7–3.1)
LYMPHOCYTES NFR BLD AUTO: 32.6 % (ref 19.6–45.3)
MCH RBC QN AUTO: 30.3 PG (ref 26.6–33)
MCHC RBC AUTO-ENTMCNC: 31 G/DL (ref 31.5–35.7)
MCV RBC AUTO: 97.5 FL (ref 79–97)
MONOCYTES # BLD AUTO: 0.35 10*3/MM3 (ref 0.1–0.9)
MONOCYTES NFR BLD AUTO: 8.9 % (ref 5–12)
NEUTROPHILS NFR BLD AUTO: 1.85 10*3/MM3 (ref 1.7–7)
NEUTROPHILS NFR BLD AUTO: 47 % (ref 42.7–76)
NRBC BLD AUTO-RTO: 0 /100 WBC (ref 0–0.2)
PLATELET # BLD AUTO: 143 10*3/MM3 (ref 140–450)
PMV BLD AUTO: 9.3 FL (ref 6–12)
RBC # BLD AUTO: 3.57 10*6/MM3 (ref 4.14–5.8)
RETICS # AUTO: 0.04 10*6/MM3 (ref 0.02–0.13)
RETICS/RBC NFR AUTO: 1 % (ref 0.7–1.9)
TIBC SERPL-MCNC: 310 MCG/DL (ref 298–536)
TRANSFERRIN SERPL-MCNC: 208 MG/DL (ref 200–360)
WBC # BLD AUTO: 3.93 10*3/MM3 (ref 3.4–10.8)

## 2021-07-06 PROCEDURE — 36415 COLL VENOUS BLD VENIPUNCTURE: CPT

## 2021-07-06 PROCEDURE — 85025 COMPLETE CBC W/AUTO DIFF WBC: CPT | Performed by: INTERNAL MEDICINE

## 2021-07-06 PROCEDURE — 85046 RETICYTE/HGB CONCENTRATE: CPT | Performed by: INTERNAL MEDICINE

## 2021-07-06 PROCEDURE — 99214 OFFICE O/P EST MOD 30 MIN: CPT | Performed by: INTERNAL MEDICINE

## 2021-07-06 PROCEDURE — 82728 ASSAY OF FERRITIN: CPT | Performed by: INTERNAL MEDICINE

## 2021-07-06 PROCEDURE — 83540 ASSAY OF IRON: CPT | Performed by: INTERNAL MEDICINE

## 2021-07-06 PROCEDURE — 84466 ASSAY OF TRANSFERRIN: CPT | Performed by: INTERNAL MEDICINE

## 2021-07-06 RX ORDER — PAROXETINE HYDROCHLORIDE 40 MG/1
TABLET, FILM COATED ORAL
Qty: 30 TABLET | Refills: 5 | Status: SHIPPED | OUTPATIENT
Start: 2021-07-06 | End: 2022-02-22

## 2021-07-15 ENCOUNTER — TRANSCRIBE ORDERS (OUTPATIENT)
Dept: ADMINISTRATIVE | Facility: HOSPITAL | Age: 71
End: 2021-07-15

## 2021-07-15 DIAGNOSIS — N13.30 HYDRONEPHROSIS, UNSPECIFIED HYDRONEPHROSIS TYPE: Primary | ICD-10-CM

## 2021-07-29 ENCOUNTER — CLINICAL SUPPORT (OUTPATIENT)
Dept: INTERNAL MEDICINE | Age: 71
End: 2021-07-29

## 2021-07-29 PROCEDURE — 96372 THER/PROPH/DIAG INJ SC/IM: CPT | Performed by: INTERNAL MEDICINE

## 2021-07-29 RX ADMIN — CYANOCOBALAMIN 1000 MCG: 1000 INJECTION, SOLUTION INTRAMUSCULAR; SUBCUTANEOUS at 11:16

## 2021-08-02 RX ORDER — FUROSEMIDE 20 MG/1
TABLET ORAL
Qty: 30 TABLET | Refills: 2 | Status: SHIPPED | OUTPATIENT
Start: 2021-08-02 | End: 2022-03-14

## 2021-08-06 ENCOUNTER — OFFICE VISIT (OUTPATIENT)
Dept: INTERNAL MEDICINE | Age: 71
End: 2021-08-06

## 2021-08-06 ENCOUNTER — HOSPITAL ENCOUNTER (OUTPATIENT)
Dept: GENERAL RADIOLOGY | Facility: HOSPITAL | Age: 71
Discharge: HOME | End: 2021-08-06
Admitting: INTERNAL MEDICINE

## 2021-08-06 VITALS
WEIGHT: 223 LBS | OXYGEN SATURATION: 98 % | HEIGHT: 77 IN | BODY MASS INDEX: 26.33 KG/M2 | TEMPERATURE: 97.5 F | SYSTOLIC BLOOD PRESSURE: 142 MMHG | DIASTOLIC BLOOD PRESSURE: 70 MMHG | HEART RATE: 63 BPM

## 2021-08-06 DIAGNOSIS — R06.89 ABNORMAL BREATH SOUNDS: ICD-10-CM

## 2021-08-06 DIAGNOSIS — R73.09 ELEVATED HEMOGLOBIN A1C: ICD-10-CM

## 2021-08-06 DIAGNOSIS — F33.41 RECURRENT MAJOR DEPRESSIVE DISORDER, IN PARTIAL REMISSION (HCC): Chronic | ICD-10-CM

## 2021-08-06 DIAGNOSIS — I10 ESSENTIAL HYPERTENSION: Primary | Chronic | ICD-10-CM

## 2021-08-06 LAB — HBA1C MFR BLD: 5.5 % (ref 4.8–5.6)

## 2021-08-06 PROCEDURE — 71046 X-RAY EXAM CHEST 2 VIEWS: CPT

## 2021-08-06 PROCEDURE — 99214 OFFICE O/P EST MOD 30 MIN: CPT | Performed by: INTERNAL MEDICINE

## 2021-08-06 RX ORDER — KETOCONAZOLE 20 MG/G
CREAM TOPICAL
COMMUNITY
Start: 2021-08-01 | End: 2022-10-20

## 2021-08-06 RX ORDER — TRIAMCINOLONE ACETONIDE 1 MG/G
CREAM TOPICAL
COMMUNITY
Start: 2021-08-01 | End: 2021-09-14 | Stop reason: ALTCHOICE

## 2021-08-06 NOTE — PROGRESS NOTES
Isha:    Jose, here are the result(s) of your test(s):     Chest xray shows no significant changes since CT scan date 5/11/21.  There is some loculated fluid at the right base and changes from prior esophageal cancer surgery.     Please do not hesitate to contact me if you have questions.

## 2021-08-06 NOTE — PROGRESS NOTES
"    I N T E R N A L  M E D I C I N E  J U N O H  K I M  M D      ENCOUNTER DATE:  08/06/2021    Jose FITCH Hurtado / 71 y.o. / male      CHIEF COMPLAINT / REASON FOR OFFICE VISIT     Hypertension and Restless Legs Syndrome      ASSESSMENT & PLAN     Problem List Items Addressed This Visit        High    Hypertension - Primary (Chronic)    Overview     Continue metoprolol tartrate 25 mg BID.          Relevant Medications    metoprolol tartrate (LOPRESSOR) 25 MG tablet    furosemide (LASIX) 20 MG tablet       Medium    Recurrent major depressive disorder, in partial remission (CMS/HCC) (Chronic)    Overview     Continue paroxetine 40 mg qd         Relevant Medications    PARoxetine (PAXIL) 40 MG tablet      Other Visit Diagnoses     Abnormal breath sounds        Relevant Orders    XR Chest 2 View    Elevated hemoglobin A1c        Relevant Orders    Hemoglobin A1c        Orders Placed This Encounter   Procedures   • XR Chest 2 View   • Hemoglobin A1c     No orders of the defined types were placed in this encounter.      SUMMARY/DISCUSSION  •       Next Appointment with me: Visit date not found    Return in about 5 months (around 1/6/2022) for Reassess chronic medical problems.      VITAL SIGNS     Visit Vitals  /70 (BP Location: Left arm)   Pulse 63   Temp 97.5 °F (36.4 °C)   Ht 195.6 cm (77.01\")   Wt 101 kg (223 lb)   SpO2 98%   BMI 26.44 kg/m²       BP Readings from Last 3 Encounters:   08/06/21 142/70   07/06/21 151/70   05/19/21 124/64     Wt Readings from Last 3 Encounters:   08/06/21 101 kg (223 lb)   07/06/21 99.1 kg (218 lb 6.4 oz)   05/19/21 94.7 kg (208 lb 12.8 oz)     Body mass index is 26.44 kg/m².      MEDICATIONS AT THE TIME OF OFFICE VISIT     Current Outpatient Medications on File Prior to Visit   Medication Sig   • albuterol sulfate  (90 Base) MCG/ACT inhaler Inhale 1 puff Every 4 (Four) Hours As Needed for Wheezing.   • Eliquis 5 MG tablet tablet TAKE ONE TABLET BY MOUTH EVERY 12 HOURS   • " ferrous sulfate 325 (65 FE) MG tablet Take 1 tablet by mouth 2 (Two) Times a Day With Meals.   • furosemide (LASIX) 20 MG tablet TAKE ONE TABLET BY MOUTH DAILY   • metoprolol tartrate (LOPRESSOR) 25 MG tablet TAKE ONE TABLET BY MOUTH TWICE A DAY   • pantoprazole (PROTONIX) 40 MG EC tablet Take 1 tablet by mouth 2 (Two) Times a Day Before Meals.   • PARoxetine (PAXIL) 40 MG tablet TAKE ONE TABLET BY MOUTH EVERY MORNING   • potassium chloride (MICRO-K) 10 MEQ CR capsule TAKE ONE CAPSULE BY MOUTH DAILY   • pramipexole (MIRAPEX) 1.5 MG tablet TAKE ONE TABLET BY MOUTH TWICE A DAY (Patient taking differently: 3 mg Every Night. 2 tabs of 1.5 mg)   • STIOLTO RESPIMAT 2.5-2.5 MCG/ACT aerosol solution inhaler Inhale 2 puffs Daily.   • [DISCONTINUED] doxycycline (MONODOX) 100 MG capsule Take 1 capsule by mouth 2 (Two) Times a Day.   • Cholecalciferol (Vitamin D3) 50 MCG (2000 UT) tablet Take 2,000 Units by mouth Daily. OTC   • ketoconazole (NIZORAL) 2 % cream    • triamcinolone (KENALOG) 0.1 % cream      Current Facility-Administered Medications on File Prior to Visit   Medication   • cyanocobalamin injection 1,000 mcg          HISTORY OF PRESENT ILLNESS     Denies significant problems or changes. Hypertension remains stable overall.       Patient Care Team:  Brandin Bolton MD as PCP - General (Internal Medicine)  Tapan, George THORPE II, MD as Consulting Physician (Hematology and Oncology)  Jose Inman MD as Consulting Physician (Urology)  Mulugeta Millan MD as Consulting Physician (Gastroenterology)  Jose Christine III, MD as Referring Physician (Thoracic Surgery)  Seth Randhawa MD as Consulting Physician (Ophthalmology)  James Cyr MD as Consulting Physician (Cardiology)    REVIEW OF SYSTEMS     Review of Systems   Denies fever, increased cough or shortness of breath   Denies chest pain  Stable mood     PHYSICAL EXAMINATION     Physical Exam  General: No acute distress, alert with normal judgment    Cardiovascular Rate: normal. Rhythm: regular. Heart sounds: normal   Lungs: decreased BS right lower lung fields ; no rales or wheezing       REVIEWED DATA     Labs:     Lab Results   Component Value Date     06/04/2021    K 4.6 06/04/2021    CALCIUM 8.9 06/04/2021    AST 18 05/19/2021    ALT 16 05/19/2021    BUN 14 06/04/2021    CREATININE 1.16 06/04/2021    CREATININE 1.59 (H) 05/19/2021    CREATININE 1.45 (H) 05/13/2021    EGFRIFNONA 62 06/04/2021    EGFRIFAFRI 75 06/04/2021       Lab Results   Component Value Date    HGBA1C 6.20 (H) 01/17/2020       Lab Results   Component Value Date    LDL 92 05/10/2018     (H) 01/30/2018    HDL 45 05/10/2018    TRIG 79 05/10/2018       Lab Results   Component Value Date    TSH 2.710 01/17/2020    TSH 2.050 06/06/2019    TSH 3.440 02/13/2019    FREET4 1.52 01/17/2020    FREET4 1.18 06/06/2019    FREET4 1.33 02/13/2019       Lab Results   Component Value Date    WBC 3.93 07/06/2021    HGB 10.8 (L) 07/06/2021     07/06/2021         Imaging:     EXAM:    CT Angiography Chest With Intravenous Contrast     CLINICAL HISTORY:     History of esophageal cancer, pleuritic chest pain, near syncope,   elevated troponin, rule out PE  Reason for exam: History of esophageal   cancer, pleuritic chest pain, near syncope, elevated troponin, rule out   PE.    FINDINGS:    Pulmonary arteries:  No pulmonary embolus.    Aorta:  No acute findings.  No thoracic aortic aneurysm.    Lungs:  Atelectasis and scarring seen within the mid to lower right   lung.  No acute findings.  No mass.    Pleural space:  Again seen is a small right loculated pleural effusion.    No pneumothorax.    Heart:  Trace pericardial effusion.  No evidence of RV dysfunction.    Mediastinum:  Status post esophagectomy with gastric pull-through.    Stable shift of mediastinum to the right.  Grossly stable subcentimeter   and pericentimeter mediastinal and hilar lymph nodes.    Bones joints:  No acute  fracture.  No dislocation.    Soft tissues:  Unremarkable.    Lymph nodes:  See above.    Kidneys and ureters:  Mild bilateral Hydro ureteronephrosis which is   incompletely visualized.  Nonobstructing calculus within left kidney.     IMPRESSION:       1.  No pulmonary embolus.  2.  Status post esophagectomy with gastric pull-through.  3.  Mild bilateral hydro-ureteronephrosis which is incompletely   visualized. Consider CT of the abdomen and pelvis.   4.  Atelectasis and scarring seen within the mid to lower right lung.  No   acute findings.  5.  Again seen is a small right loculated pleural effusion.       Medical Tests:           Summary of old records / correspondence / consultant report:           Request outside records:             *Examiner was wearing KN95 mask and eye protection during the entire duration of the visit. Patient was masked the entire time.   Minimum social distance of 6 ft maintained entire visit except if physical contact was necessary as documented.     **Dragon Disclaimer:   Much of this encounter note is an electronic transcription/translation of spoken language to printed text. The electronic translation of spoken language may permit erroneous, or at times, nonsensical words or phrases to be inadvertently transcribed. Although I have reviewed the note for such errors, some may still exist.       Template created by Moose Bolton MD

## 2021-08-30 ENCOUNTER — CLINICAL SUPPORT (OUTPATIENT)
Dept: INTERNAL MEDICINE | Age: 71
End: 2021-08-30

## 2021-08-30 PROCEDURE — 96372 THER/PROPH/DIAG INJ SC/IM: CPT | Performed by: INTERNAL MEDICINE

## 2021-08-30 RX ADMIN — CYANOCOBALAMIN 1000 MCG: 1000 INJECTION, SOLUTION INTRAMUSCULAR; SUBCUTANEOUS at 10:22

## 2021-09-07 RX ORDER — PANTOPRAZOLE SODIUM 40 MG/1
TABLET, DELAYED RELEASE ORAL
Qty: 180 TABLET | Refills: 3 | Status: SHIPPED | OUTPATIENT
Start: 2021-09-07 | End: 2023-01-16

## 2021-09-14 ENCOUNTER — TELEPHONE (OUTPATIENT)
Dept: CARDIOLOGY | Facility: CLINIC | Age: 71
End: 2021-09-14

## 2021-09-14 ENCOUNTER — OFFICE VISIT (OUTPATIENT)
Dept: CARDIOLOGY | Facility: CLINIC | Age: 71
End: 2021-09-14

## 2021-09-14 VITALS
DIASTOLIC BLOOD PRESSURE: 78 MMHG | WEIGHT: 225 LBS | SYSTOLIC BLOOD PRESSURE: 152 MMHG | HEART RATE: 69 BPM | HEIGHT: 77 IN | BODY MASS INDEX: 26.57 KG/M2 | OXYGEN SATURATION: 99 %

## 2021-09-14 DIAGNOSIS — Z79.01 CHRONIC ANTICOAGULATION: ICD-10-CM

## 2021-09-14 DIAGNOSIS — I10 ESSENTIAL HYPERTENSION: Chronic | ICD-10-CM

## 2021-09-14 DIAGNOSIS — D50.9 IRON DEFICIENCY ANEMIA, UNSPECIFIED IRON DEFICIENCY ANEMIA TYPE: ICD-10-CM

## 2021-09-14 DIAGNOSIS — I82.432 ACUTE DEEP VEIN THROMBOSIS (DVT) OF POPLITEAL VEIN OF LEFT LOWER EXTREMITY (HCC): Primary | ICD-10-CM

## 2021-09-14 DIAGNOSIS — Z85.01 HISTORY OF ESOPHAGEAL CANCER: ICD-10-CM

## 2021-09-14 PROCEDURE — 99214 OFFICE O/P EST MOD 30 MIN: CPT | Performed by: NURSE PRACTITIONER

## 2021-09-14 PROCEDURE — 93000 ELECTROCARDIOGRAM COMPLETE: CPT | Performed by: NURSE PRACTITIONER

## 2021-09-14 RX ORDER — POTASSIUM CHLORIDE 750 MG/1
CAPSULE, EXTENDED RELEASE ORAL
COMMUNITY
Start: 2021-08-01 | End: 2022-01-13 | Stop reason: SDUPTHER

## 2021-09-14 NOTE — PROGRESS NOTES
Date of Office Visit: 21  Encounter Provider: FAYE Nicole  Primary Cardiologist: Dr. Cyr  Place of Service: [facility] CARDIOLOGY  Patient Name: Jose Hurtado  :1950      Subjective:     Chief Complaint:  Yearly follow-up chronic dyspnea, hyperdynamic LV, hypertension    History of Present Illness:  Jose Hurtado is a pleasant 71 y.o. male who is new to me .  Outside records have been requested and reviewed by me if available.     This is a patient of Dr. Cyr with history of hypertension, DVTs due to antiphospholipid syndrome on apixaban, esophageal cancer status post radiation and chemotherapy followed with Dr. Christine and Dr. Hyman, hyperlipidemia, chronic dyspnea, hyperdynamic LV on metoprolol chronically elevated elevated troponin.    1/15/2020 patient had an echocardiogram that showed hyperdynamic left ventricular systolic function with an EF of 72% aortic sclerosis, trace TR no other significant abnormalities normal LV wall contractility, cavity size and wall thickness as well as diastolic function.    2020 patient was last in the office to see Dr. CyrApparently he was overall stable.  He had some leg swelling and it was felt that his chronic dyspnea is related to his hyperdynamic left ventricle and he is to continue metoprolol and follow back up in the ER.    2020 patient was mated for rectal bleeding and anemia with a hemoglobin of 6.6 requiring transfusion.  He had a colonoscopy that showed internal hemorrhoids thought to be the source of bleeding.  He was seen by surgery and recommended repair of hemorrhoids and Eliquis was held however it is unclear if that surgery was actually performed.    5/10/2021 patient was admitted to the hospital with complaints of substernal sharp chest pain worse with taking deep breath and near syncope.  Troponin was elevated.  Creatinine elevated 1.39 H&H low 10.6/31.4 white blood cell count low 3.05.   CT of chest did not reveal pulmonary embolism.  He had hyperinflation of the lungs suggesting COPD and a trace right pleural effusion.  Was felt that his symptoms were not anginal but pleuritic in nature.  It was recommended to put him back on metoprolol and observe. That admission he was found to have ESBL E. coli of the urine culture and was treated with antibiotics.  His Lasix was held for slight bump in his creatinine he was to follow-up with PCP.  He was discharged 5/13/2021.    Patient is presenting today for his regular yearly follow-up. Patient states he is overall doing well.  He said no further chest pain.  He has some mild shortness of breath occasionally with overexertion that is stable and not worsening.  He denies rest dyspnea, PND, orthopnea.  Denies rapid irregular heartbeat.  He has lower extremity edema that is chronic and worse in the left leg.  He denies any further bleeding and no falls, dizziness, syncope, near syncope.  Blood pressure is somewhat elevated in the office today.  Multiple attempts were made at getting an EKG today however patient has a stimulator in his back for his urinary incontinence and there was significant artifact.      Past Medical History:   Diagnosis Date   • Acute deep vein thrombosis (DVT) of popliteal vein of left lower extremity (CMS/HCC) 03/24/2020   • Anemia    • Anti-phospholipid syndrome (CMS/[facility])     On lifelong anticoagulation therapy   • Bladder disorder     LEAKAGE   ON MED  wers pads   • Bleeding hemorrhoids    • Community acquired pneumonia     HISTORY OF IN 2014   • Deep venous thrombosis (CMS/HCC) 2006, 2008    Left lower extremity multiple   • Depression    • Esophageal carcinoma (CMS/HCC) 12/31/2014    had chemo and radiation prior surgery   • Hemorrhoids    • HH (hiatus hernia)    • History of atrial fibrillation 2015    ONE EPISODE WHILE HOSPITALIZED   • History of kidney stones    • History of nephrolithiasis    • History of pancreatitis     PT  STATES MANY YEARS AGO   • History of radiation therapy    • History of transfusion    • Hypertension    • Long-term (current) use of anticoagulants, INR goal 2.0-3.0    • Lymphedema    • Malignant neoplasm of prostate (CMS/HCC)    • Other hyperlipidemia 1/30/2018 January 30, 2018 lipid panel risk 12.8%   • Restless legs syndrome    • Sleep apnea     OCCASIONALLY WEARS CPAP   • Squamous carcinoma     on the head     Past Surgical History:   Procedure Laterality Date   • APPENDECTOMY  1950   • BRONCHOSCOPY      (Diagnostic)   • CATARACT EXTRACTION Bilateral 2014   • COLONOSCOPY  12/15/2014    Complete / Description: EH, IH, torts, stool, follow-up colonoscopy due in 5 years.   • COLONOSCOPY N/A 6/13/2017    non-thrombosed external hemorrhoids, normal examined ileum, IH   • COLONOSCOPY N/A 2/26/2019    Procedure: COLONOSCOPY with Cold Polypectomy;  Surgeon: Mulugeta Millan MD;  Location[facility] ENDOSCOPY;  Service: Gastroenterology   • COLONOSCOPY N/A 12/16/2020    Procedure: COLONOSCOPY to cecum into TI;  Surgeon: Mesha Sanchez MD;  Location: [facility] ENDOS[facility];  Service: Gastroenterology;  Laterality: N/A;  pre: lower GI bleed  post: hemorrhoids    • CYSTOSCOPY W/ LASER LITHOTRIPSY     • ENDOSCOPY N/A 6/13/2017    Procedure: ESOPHAGOGASTRODUODENOSCOPY WITH COLD BIOPSY;  Surgeon: Lake Gonzalez MD;  Location: [facility] ENDOSCOPY;  Service:    • ESOPHAGECTOMY      April 2015, stage IIB esophageal carcinoma, sub-total resection.   • ESOPHAGECTOMY      Esophagectomy Subtotal [place] Procedure   • EXCISION LESION  08/2012    Removal of Squamous Cell CA on Head   • HAMMER TOE REPAIR  09/2014    Hammertoe Operation (Each Toe), 10/2014   • HAMMER TOE REPAIR Left 10/3/2017    Procedure: Left second third and fourth distal interphalangeal joint resection with flexor tenotomy;  Surgeon: Mulugeta Lira MD;  Location:  [facility];  Service:    • HEMORRHOIDECTOMY N/A 12/23/2020    Procedure:  HEMORRHOIDECTOMY;  Surgeon: Bilyl Julio Jr., MD;  Location: [facility] MAIN OR;  Service: General;  Laterality: N/A;   • HERNIA REPAIR      incisional   • JEJUNOSTOMY      Laparoscopic   • JEJUNOSTOMY      tube removal    • KNEE SURGERY Bilateral 1967, 1973, 1981   • PATELLA SURGERY Left     removed   • PILONIDAL CYST / SINUS EXCISION     • ID TOTAL KNEE ARTHROPLASTY Right 3/26/2018    Procedure: TOTAL KNEE ARTHROPLASTY;  Surgeon: Renny Solis MD;  Location: [facility] MAIN OR;  Service: Orthopedics   • PROSTATECTOMY  2010   • PYLOROPLASTY     • SPINAL FUSION  02/1998    C 5,6   • TONSILLECTOMY     • UPPER GASTROINTESTINAL ENDOSCOPY  12/15/2014    LA Grade D esophagitis, Pardo's, HH, multiple duodenal ulcers   • VENTRAL/INCISIONAL HERNIA REPAIR N/A 4/14/2016    Procedure: VENTRAL/INCISIONAL HERNIA REPAIR, open, with mesh, and component separation;  Surgeon: Darren Rivas MD;  Location: [facility] MAIN OR;  Service:      Outpatient Medications Prior to Visit   Medication Sig Dispense Refill   • albuterol sulfate  (90 Base) MCG/ACT inhaler Inhale 1 puff Every 4 (Four) Hours As Needed for Wheezing.     • Eliquis 5 MG tablet tablet TAKE ONE TABLET BY MOUTH EVERY 12 HOURS 180 tablet 1   • furosemide (LASIX) 20 MG tablet TAKE ONE TABLET BY MOUTH DAILY 30 tablet 2   • ketoconazole (NIZORAL) 2 % cream      • metoprolol tartrate (LOPRESSOR) 25 MG tablet TAKE ONE TABLET BY MOUTH TWICE A  tablet 3   • pantoprazole (PROTONIX) 40 MG EC tablet TAKE ONE TABLET BY MOUTH TWICE A DAY BEFORE A MEAL 180 tablet 3   • PARoxetine (PAXIL) 40 MG tablet TAKE ONE TABLET BY MOUTH EVERY MORNING 30 tablet 5   • potassium chloride (MICRO-K) 10 MEQ CR capsule TAKE ONE CAPSULE BY MOUTH DAILY 30 capsule 5   • potassium chloride (MICRO-K) 10 MEQ CR capsule      • pramipexole (MIRAPEX) 1.5 MG tablet TAKE ONE TABLET BY MOUTH TWICE A DAY (Patient taking differently: 3 mg Every Night. 2 tabs of 1.5 mg) 180 tablet 1   • Cholecalciferol  "(Vitamin D3) 50 MCG (2000 UT) tablet Take 2,000 Units by mouth Daily. OTC     • ferrous sulfate 325 (65 FE) MG tablet Take 1 tablet by mouth 2 (Two) Times a Day With Meals. 30 tablet 1   • STIOLTO RESPIMAT 2.5-2.5 MCG/ACT aerosol solution inhaler Inhale 2 puffs Daily.     • triamcinolone (KENALOG) 0.1 % cream        Facility-Administered Medications Prior to Visit   Medication Dose Route Frequency Provider Last Rate Last Admin   • cyanocobalamin injection 1,000 mcg  1,000 mcg Intramuscular Q28 Days Brandin Bolton MD   1,000 mcg at 21 1022       Allergies as of 2021   • (No Known Allergies)     Social History     Socioeconomic History   • Marital status:      Spouse name: Lena   • Number of children: 0   • Years of education: College   • Highest education level: Not on file   Tobacco Use   • Smoking status: Former Smoker     Packs/day: 2.00     Years: 3.00     Pack years: 6.00     Types: Cigarettes     Quit date:      Years since quittin.7   • Smokeless tobacco: Former User     Types: Chew   • Tobacco comment: no caffeine    Vaping Use   • Vaping Use: Never used   Substance and Sexual Activity   • Alcohol use: Yes     Alcohol/week: 3.0 standard drinks     Types: 1 Glasses of wine, 1 Cans of beer, 1 Shots of liquor per week     Comment: 1-2 drinks/week   • Drug use: Not Currently     Types: Marijuana     Comment: hx marijuana use \"a long time ago\"   • Sexual activity: Defer     Family History   Problem Relation Age of Onset   • Cerebral aneurysm Mother         cerebral artery aneurysm ( age 56)   • Prostate cancer Brother 68   • Anxiety disorder Father    • Suicide Attempts Father          of suicide   • Cancer Father         bladder   • No Known Problems Brother    • Pancreatic cancer Nephew    • Colon cancer Neg Hx    • Esophageal cancer Neg Hx    • Dementia Neg Hx    • Malig Hyperthermia Neg Hx      Review of Systems   Constitutional: Negative for chills, fever, weight gain and " "weight loss.   Cardiovascular: Positive for dyspnea on exertion and leg swelling. Negative for chest pain.   Respiratory: Positive for cough (occasional). Negative for shortness of breath, snoring and wheezing.    Hematologic/Lymphatic: Negative for bleeding problem. Does not bruise/bleed easily.   Skin: Negative for color change.   Musculoskeletal: Positive for joint pain. Negative for falls and myalgias.   Gastrointestinal: Negative for diarrhea, melena, nausea and vomiting.   Genitourinary: Negative for hematuria.   Neurological: Positive for excessive daytime sleepiness. Negative for dizziness and light-headedness.   Psychiatric/Behavioral: Positive for depression. The patient is not nervous/anxious.           Objective:     Vitals:    09/14/21 1540 09/14/21 1552   BP: 152/78    Pulse: 69 69   SpO2:  99%   Weight: 102 kg (225 lb)    Height: 195.6 cm (77\")      Body mass index is 26.68 kg/m².    PHYSICAL EXAM:  Vitals and nursing note reviewed.   Constitutional:       General: Not in acute distress.     Appearance: Well-developed and not in distress. Not diaphoretic.   HENT:      Head: Normocephalic and atraumatic.      Ears:      Comments: Hard of hearing  Neck:      Vascular: No carotid bruit or JVD.   Pulmonary:      Effort: Pulmonary effort is normal. No respiratory distress.      Breath sounds: Normal breath sounds. No wheezing. No rhonchi. No rales.   Cardiovascular:      Normal rate. Regular rhythm.      Murmurs: There is no murmur.   Pulses:     Intact distal pulses.   Edema:     Pretibial: 3+ edema of the left pretibial area and 2+ edema of the right pretibial area.     Ankle: 3+ edema of the left ankle and 2+ edema of the right ankle.     Feet: 3+ edema of the left foot and 2+ edema of the right foot.  Abdominal:      General: Bowel sounds are normal. There is no distension.      Palpations: Abdomen is soft.      Tenderness: There is no abdominal tenderness.   Musculoskeletal:      Comments: Uses a cane " Skin:     General: Skin is warm and dry.      Findings: No erythema.   Neurological:      Mental Status: Alert and oriented to person, place, and time.   Psychiatric:         Speech: Speech normal.         Behavior: Behavior normal.         Thought Content: Thought content normal.         Judgment: Judgment normal.           ECG 12 Lead    Date/Time: 9/14/2021 3:30 PM  Performed by: Carmen Watts APRN  Authorized by: Carmen Watts APRN   Comparison: compared with previous ECG from 5/11/2021  Rhythm: sinus rhythm  Ectopy: unifocal PVCs  Rate: normal  BPM: 69    Clinical impression: abnormal EKG and poor quality tracing that precludes reliable interpretation - Repeat ECG  Comments: There is significant artifact in leads II and III patient has spinal stimulator.  Multiple attempts at getting an EKG were made all with similar artifact    Indication: Hypertension, history of chronic dyspnea, chest pain without recurrence            Most recent lab review:  6/4/2021 BMP normal  Assessment:       Diagnosis Plan   1. Acute deep vein thrombosis (DVT) of popliteal vein of left lower extremity (CMS/HCC)     2. Chronic anticoagulation     3. Essential hypertension     4. Iron deficiency anemia, unspecified iron deficiency anemia type     5. History of esophageal cancer         Plan:     1. Chronic dyspnea: 4/2019 stress test without ischemia.  He has a listed history of COPD and is on inhaler.  He states he does see pulmonology Dr. Hoffman this is stable.  2. Hypercoagulable state with antiphospholipid syndrome and chronic DVT: On apixaban managed by hematology  3.  Hyperdynamic LV: Maintained on metoprolol.  Last echo 1/2020  4.  Chronically elevated troponin: Not felt to represent ACS  5.  Hypertension: BP is somewhat elevated today.  Recommend continue monitoring determine if he needs additional medication adjustments if consistently above 140/90 at home  6.  Anemia: Hx GI bleeding from hemorrhoids December 2020.  Defer  monitoring to hematology.  Currently denies any bleeding issues or falls.  7.  History of esophageal cancer: Follows with hematology/oncology and thoracic surgery    Overall he appears stable from a cardiac standpoint.  I will not make any changes.  Should follow his blood pressure.  He denies need for medication refills.  His medications are filled by his PCP and hematologist.      Follow up with Dr. Cyr in 6 months, unless otherwise needed sooner.  I advised the patient to contact our office with any questions or concerns.       It has been a pleasure to participate in this patient's care. Please feel free to contact me with any questions or concerns.     Carmen Watts, APRN  09/14/21             Your medication list          Accurate as of September 14, 2021  4:33 PM. If you have any questions, ask your nurse or doctor.            CHANGE how you take these medications      Instructions Last Dose Given Next Dose Due   pramipexole 1.5 MG tablet  Commonly known as: MIRAPEX  What changed:   · how much to take  · how to take this  · when to take this  · additional instructions      TAKE ONE TABLET BY MOUTH TWICE A DAY          CONTINUE taking these medications      Instructions Last Dose Given Next Dose Due   albuterol sulfate  (90 Base) MCG/ACT inhaler  Commonly known as: PROVENTIL HFA;VENTOLIN HFA;PROAIR HFA      Inhale 1 puff Every 4 (Four) Hours As Needed for Wheezing.       Eliquis 5 MG tablet tablet  Generic drug: apixaban      TAKE ONE TABLET BY MOUTH EVERY 12 HOURS       furosemide 20 MG tablet  Commonly known as: LASIX      TAKE ONE TABLET BY MOUTH DAILY       ketoconazole 2 % cream  Commonly known as: NIZORAL           metoprolol tartrate 25 MG tablet  Commonly known as: LOPRESSOR      TAKE ONE TABLET BY MOUTH TWICE A DAY       pantoprazole 40 MG EC tablet  Commonly known as: PROTONIX      TAKE ONE TABLET BY MOUTH TWICE A DAY BEFORE A MEAL       PARoxetine 40 MG tablet  Commonly known as:  PAXIL      TAKE ONE TABLET BY MOUTH EVERY MORNING       potassium chloride 10 MEQ CR capsule  Commonly known as: MICRO-K      TAKE ONE CAPSULE BY MOUTH DAILY       potassium chloride 10 MEQ CR capsule  Commonly known as: MICRO-K                  The above medication changes may not have been made by this provider.  Medication list was updated to reflect medications patient is currently taking including medication changes and discontinuations made by other healthcare providers or the patients themselves.     Dictated utilizing Dragon Dictation System.

## 2021-09-15 NOTE — TELEPHONE ENCOUNTER
"Please follow up with your Primary care provider within 5-7 days if your signs and symptoms have not resolved or worsen.     If your condition worsens or fails to improve we recommend that you receive another evaluation at the emergency room immediately or contact your primary medical clinic to discuss your concerns.     You must understand that you have received an Urgent Care treatment only and that you may be released before all of your medical problems are known or treated.   You, the patient, will arrange for follow up care as instructed.     Take over-the-counter claritin, zyrtec, allegra, or xyzal as directed.    Use over the counter Flonase as directed for sinus congestion and postnasal drip.    Use nasal saline prior to Flonase.    You have been given an antibiotic to treat your condition today.  Even if your symptoms improve, please complete the medication as directed on the bottle.     As with any antibiotics, use probiotics and/or high culture yogurt about 2 hours apart from the antibiotic and about 1 week after the antibiotic to replace the gut antwan lost with antibiotic use.      If you are female and on BCP use additional methods to prevent pregnancy while on antibiotics and for one cycle after.     Wait and See Antibiotic    You have been given a "wait and see" antibiotic. You should wait to see if symptoms improve within the next 48-72 hours before you start the antibiotic.      It is important to follow these instructions as antibiotic resistance is high.  Your symptoms will likely resolve without this prescription.      If you do start the antibiotic, please complete the antibiotic as directed on the bottle.      If you are female and on BCP use additional methods to prevent pregnancy while on antibiotics and for one cycle after.             Viral Pharyngitis (Sore Throat)    You (or your child, if your child is the patient) have pharyngitis (sore throat). This infection is caused by a virus. " Patient called the office back per Cathleen and left a voicemail with Ruba white.  I spoke with the patient after his appointment yesterday to follow-up on his blood pressures.  At home he states he typically gets readings 130/60-70s.  Rarely will he have a reading in the 140s never readings in the 80s to 90s diastolic.  He will continue to monitor and I reviewed parameters for readings and signs and symptoms for which he should call our office sooner and to come for another attempt at EKG.  He will keep 6-month March 2022 scheduled follow-up with Dr. Cyr unless otherwise needed sooner.  He verbalized understanding of the plan of care.   It can cause throat pain that is worse when swallowing, aching all over, headache, and fever. The infection may be spread by coughing, kissing, or touching others after touching your mouth or nose. Antibiotic medications do not work against viruses, so they are not used for treating this condition.  Home care  · If your symptoms are severe, rest at home. Return to work or school when you feel well enough.   · Drink plenty of fluids to avoid dehydration.  · For children: Use acetaminophen for fever, fussiness or discomfort. In infants over six months of age, you may use ibuprofen instead of acetaminophen. (NOTE: If your child has chronic liver or kidney disease or ever had a stomach ulcer or GI bleeding, talk with your doctor before using these medicines.) (NOTE: Aspirin should never be used in anyone under 18 years of age who is ill with a fever. It may cause severe liver damage.)   · For adults: You may use acetaminophen or ibuprofen to control pain or fever, unless another medicine was prescribed for this. (NOTE: If you have chronic liver or kidney disease or ever had a stomach ulcer or GI bleeding, talk with your doctor before using these medicines.)  · Throat lozenges or numbing throat sprays can help reduce pain. Gargling with warm salt water will also help reduce throat pain. For this, dissolve 1/2 teaspoon of salt in 1 glass of warm water. To help soothe a sore throat, children can sip on juice or a popsicle. Children 5 years and older can also suck on a lollipop or hard candy.  · Avoid salty or spicy foods, which can be irritating to the throat.  Follow-up care  Follow up with your healthcare provider or our staff if you are not improving over the next week.  When to seek medical advice  Call your healthcare provider right away if any of these occur:  · Fever as directed by your doctor.  For children, seek care if:  ¨ Your child is of any age and has repeated fevers above 104°F (40°C).  ¨ Your child is  younger than 2 years of age and has a fever of 100.4°F (38°C) that continues for more than 1 day.  ¨ Your child is 2 years old or older and has a fever of 100.4°F (38°C) that continues for more than 3 days.  · New or worsening ear pain, sinus pain, or headache  · Painful lumps in the back of neck  · Stiff neck  · Lymph nodes are getting larger  · Inability to swallow liquids, excessive drooling, or inability to open mouth wide due to throat pain  · Signs of dehydration (very dark urine or no urine, sunken eyes, dizziness)  · Trouble breathing or noisy breathing  · Muffled voice  · New rash  · Child appears to be getting sicker  Date Last Reviewed: 2015  © 8810-4641 [employer] [address] [place] [address], [place] [address]. All rights reserved. This information is not intended as a substitute for professional medical care. Always follow your healthcare professional's instructions.

## 2021-09-27 NOTE — PROGRESS NOTES
Subjective .     REASONS FOR FOLLOWUP:  Esophageal cancer, cytopenias     HISTORY OF PRESENT ILLNESS:  The patient is a 71 y.o. year old male  who is here for follow-up with the above-mentioned history.    No problems eating.  Food does not get stuck.  No pain with eating.    No nausea.    No weight loss.    Previously having 1 bowel movement on average 3 times per week.  Over the past week has been having about 3 bowel movements per day, each of these solid, formed stools.  No blood in the stools.  He did have one episode of dark stools yesterday but otherwise, no dark stools.  Remains on anticoagulation.  He has not seen any bleeding.    Past Medical History:   Diagnosis Date   • Acute deep vein thrombosis (DVT) of popliteal vein of left lower extremity (CMS/HCC) 03/24/2020   • Anemia    • Anti-phospholipid syndrome (CMS/HCC)     On lifelong anticoagulation therapy   • Bladder disorder     LEAKAGE   ON MED  wers pads   • Bleeding hemorrhoids    • Community acquired pneumonia     HISTORY OF IN 2014   • Deep venous thrombosis (CMS/HCC) 2006, 2008    Left lower extremity multiple   • Depression    • Esophageal carcinoma (CMS/HCC) 12/31/2014    had chemo and radiation prior surgery   • Hemorrhoids    • HH (hiatus hernia)    • History of atrial fibrillation 2015    ONE EPISODE WHILE HOSPITALIZED   • History of kidney stones    • History of nephrolithiasis    • History of pancreatitis     PT STATES MANY YEARS AGO   • History of radiation therapy    • History of transfusion    • Hypertension    • Long-term (current) use of anticoagulants, INR goal 2.0-3.0    • Lymphedema    • Malignant neoplasm of prostate (CMS/HCC)    • Other hyperlipidemia 1/30/2018 January 30, 2018 lipid panel risk 12.8%   • Restless legs syndrome    • Sleep apnea     OCCASIONALLY WEARS CPAP   • Squamous carcinoma     on the head     Past Surgical History:   Procedure Laterality Date   • APPENDECTOMY  1950   • BRONCHOSCOPY      (Diagnostic)   •  CATARACT EXTRACTION Bilateral 2014   • COLONOSCOPY  12/15/2014    Complete / Description: EH, IH, torts, stool, follow-up colonoscopy due in 5 years.   • COLONOSCOPY N/A 6/13/2017    non-thrombosed external hemorrhoids, normal examined ileum, IH   • COLONOSCOPY N/A 2/26/2019    Procedure: COLONOSCOPY with Cold Polypectomy;  Surgeon: Mulugeta Millan MD;  Location: [facility] ENDOSCOPY;  Service: Gastroenterology   • COLONOSCOPY N/A 12/16/2020    Procedure: COLONOSCOPY to cecum into TI;  Surgeon: Mesha Sanchez MD;  [facility] [place] [facility];  Service: Gastroenterology;  Laterality: N/A;  pre: lower GI bleed  post: hemorrhoids    • CYSTOSCOPY W/ LASER LITHOTRIPSY     • ENDOSCOPY N/A 6/13/2017    Procedure: ESOPHAGOGASTRODUODENOSCOPY WITH COLD BIOPSY;  Surgeon: Lake Gonzalez MD;  Location: [facility] ENDOSCOPY;  Service:    • ESOPHAGECTOMY      April 2015, stage IIB esophageal carcinoma, sub-total resection.   • ESOPHAGECTOMY      Esophagectomy Subtotal Andrea Joe Procedure   • EXCISION LESION  08/2012    Removal of Squamous Cell CA on Head   • HAMMER TOE REPAIR  09/2014    Hammertoe Operation (Each Toe), 10/2014   • HAMMER TOE REPAIR Left 10/3/2017    Procedure: Left second third and fourth distal interphalangeal joint resection with flexor tenotomy;  Surgeon: Mulugeta Lira MD;  [place] [facility] [place] [facility]  Service:    • HEMORRHOIDECTOMY N/A 12/23/2020    Procedure: HEMORRHOIDECTOMY;  Surgeon: Billy Julio Jr., MD;  Location: [facility] MAIN OR;  Service: General;  Laterality: N/A;   • HERNIA REPAIR      incisional   • JEJUNOSTOMY      Laparoscopic   • JEJUNOSTOMY      tube removal    • KNEE SURGERY Bilateral 1967, 1973, 1981   • PATELLA SURGERY Left     removed   • PILONIDAL CYST / SINUS EXCISION     • NM TOTAL KNEE ARTHROPLASTY Right 3/26/2018    Procedure: TOTAL KNEE ARTHROPLASTY;  Surgeon: Renny Solis MD;  Location: [facility] MAIN OR;  Service: Orthopedics   • PROSTATECTOMY  2010   • PYLOROPLASTY      • SPINAL FUSION  02/1998    C 5,6   • TONSILLECTOMY     • UPPER GASTROINTESTINAL ENDOSCOPY  12/15/2014    LA Grade D esophagitis, Pardo's, HH, multiple duodenal ulcers   • VENTRAL/INCISIONAL HERNIA REPAIR N/A 4/14/2016    Procedure: VENTRAL/INCISIONAL HERNIA REPAIR, open, with mesh, and component separation;  Surgeon: Darren Rivas MD;  Location: [facility];  Service:        HEMATOLOGIC/ONCOLOGIC HISTORY:  (History from previous dates can be found in the separate document.)    MEDICATIONS    Current Outpatient Medications:   •  albuterol sulfate  (90 Base) MCG/ACT inhaler, Inhale 1 puff Every 4 (Four) Hours As Needed for Wheezing., Disp: , Rfl:   •  Eliquis 5 MG tablet tablet, TAKE ONE TABLET BY MOUTH EVERY 12 HOURS, Disp: 180 tablet, Rfl: 1  •  furosemide (LASIX) 20 MG tablet, TAKE ONE TABLET BY MOUTH DAILY, Disp: 30 tablet, Rfl: 2  •  ketoconazole (NIZORAL) 2 % cream, , Disp: , Rfl:   •  metoprolol tartrate (LOPRESSOR) 25 MG tablet, TAKE ONE TABLET BY MOUTH TWICE A DAY, Disp: 180 tablet, Rfl: 3  •  pantoprazole (PROTONIX) 40 MG EC tablet, TAKE ONE TABLET BY MOUTH TWICE A DAY BEFORE A MEAL, Disp: 180 tablet, Rfl: 3  •  PARoxetine (PAXIL) 40 MG tablet, TAKE ONE TABLET BY MOUTH EVERY MORNING, Disp: 30 tablet, Rfl: 5  •  potassium chloride (MICRO-K) 10 MEQ CR capsule, TAKE ONE CAPSULE BY MOUTH DAILY, Disp: 30 capsule, Rfl: 5  •  potassium chloride (MICRO-K) 10 MEQ CR capsule, , Disp: , Rfl:   •  pramipexole (MIRAPEX) 1.5 MG tablet, TAKE ONE TABLET BY MOUTH TWICE A DAY (Patient taking differently: 3 mg Every Night. 2 tabs of 1.5 mg), Disp: 180 tablet, Rfl: 1    Current Facility-Administered Medications:   •  cyanocobalamin injection 1,000 mcg, 1,000 mcg, Intramuscular, Q28 Days, Brandin Bolton MD, 1,000 mcg at 08/30/21 1022    ALLERGIES:   No Known Allergies    SOCIAL HISTORY:       Social History     Socioeconomic History   • Marital status:      Spouse name: Lena   • Number of  "children: 0   • Years of education: College   • Highest education level: Not on file   Tobacco Use   • Smoking status: Former Smoker     Packs/day: 2.00     Years: 3.00     Pack years: 6.00     Types: Cigarettes     Quit date:      Years since quittin.7   • Smokeless tobacco: Former User     Types: Chew   • Tobacco comment: no caffeine    Vaping Use   • Vaping Use: Never used   Substance and Sexual Activity   • Alcohol use: Yes     Alcohol/week: 3.0 standard drinks     Types: 1 Glasses of wine, 1 Cans of beer, 1 Shots of liquor per week     Comment: 1-2 drinks/week   • Drug use: Not Currently     Types: Marijuana     Comment: hx marijuana use \"a long time ago\"   • Sexual activity: Defer         FAMILY HISTORY:  Family History   Problem Relation Age of Onset   • Cerebral aneurysm Mother         cerebral artery aneurysm ( age 56)   • Prostate cancer Brother 68   • Anxiety disorder Father    • Suicide Attempts Father          of suicide   • Cancer Father         bladder   • No Known Problems Brother    • Pancreatic cancer Nephew    • Colon cancer Neg Hx    • Esophageal cancer Neg Hx    • Dementia Neg Hx    • Malig Hyperthermia Neg Hx        REVIEW OF SYSTEMS:    Review of Systems   Constitutional: Negative for activity change.   HENT: Negative for nosebleeds and trouble swallowing.    Respiratory: Negative for shortness of breath and wheezing.    Cardiovascular: Negative for chest pain and palpitations.   Gastrointestinal: Negative for constipation and diarrhea.   Genitourinary: Negative for dysuria and hematuria.   Musculoskeletal: Negative for arthralgias and myalgias.   Neurological: Negative for seizures and syncope.   Hematological: Negative for adenopathy. Does not bruise/bleed easily.   Psychiatric/Behavioral: Negative for confusion.           Objective    Vitals:    21 1029   BP: 157/77   Pulse: (!) 49   Resp: 18   Temp: 97.3 °F (36.3 °C)   TempSrc: Temporal   SpO2: 95%   Weight: 103 kg " "(227 lb 6.4 oz)   Height: 195.6 cm (77.01\")   PainSc: 0-No pain     Current Status 9/28/2021   ECOG score 0      PHYSICAL EXAM:        CONSTITUTIONAL:  Vital signs reviewed.  No distress, looks comfortable.  EYES:  Conjunctiva and lids unremarkable.  PERRLA  EARS,NOSE,MOUTH,THROAT:  Ears and nose appear unremarkable.  Lips, teeth, gums appear unremarkable.  RESPIRATORY:  Normal respiratory effort.  Lungs clear to auscultation bilaterally.  CARDIOVASCULAR:  Normal S1, S2.  No murmurs rubs or gallops.  No changes in significant left leg edema.  GASTROINTESTINAL: Abdomen appears unremarkable.  Nontender.  No hepatomegaly.  No splenomegaly.  LYMPHATIC:  No cervical, supraclavicular, axillary lymphadenopathy.  SKIN:  Warm.  No rashes.  PSYCHIATRIC:  Normal judgment and insight.  Normal mood and affect.           RECENT LABS:        WBC   Date/Time Value Ref Range Status   09/28/2021 10:32 AM 3.88 3.40 - 10.80 10*3/mm3 Final   04/15/2019 12:31 PM 3.53 3.40 - 10.80 10*3/mm3 Final   04/16/2018 04:25 PM 4.23 (L) 4.5 - 11.0 10*3/uL Final     Hemoglobin   Date/Time Value Ref Range Status   09/28/2021 10:32 AM 11.4 (L) 13.0 - 17.7 g/dL Final   04/16/2018 04:25 PM 9.9 (L) 13.5 - 17.5 g/dL Final     Platelets   Date/Time Value Ref Range Status   09/28/2021 10:32  140 - 450 10*3/mm3 Final   04/16/2018 04:25  140 - 440 10*3/uL Final       Assessment/Plan     ASSESSMENT:  Anemia, unspecified type  - Ferritin  - Iron Profile  - Retic With IRF & RET-He  - CBC & Differential      *Esophageal adenocarcinoma. Initially T2N0M0. After neoadjuvant carboplatin/Taxol with radiation, achieved a pathologic CR at resection, 4/24/2015.   · As per NCCN guidelines, plan CAT scans every 6 months x1 year, then every 6 to 9 months on years 2 and years 3. Defer any surveillance EGDs to Dr. Gonzalez if he feels they are appropriate.   · Also as per NCCN guidelines, plan H and P every 3 to 6 months on years 1 and years 2, then every 6 to 12 " months' time on years 3 through years 5 and then annually.   · EGD 6/13/17 by Dr. Gonzalez: No evidence of recurrence.  · CT scan 3/2/18 (this completed 3 years of surveillance CT): No evidence of recurrence.  Plan no more surveillance CT.  No signs of recurrence.  Remains in remission.    *Recurrent DVT, prior to cancer diagnosis.    · Dr. Bolton previously managed his Coumadin.   · Chronic left leg larger than right, since DVT  · Acute left popliteal, gastrocnemius DVT on 3/24/2020 despite INR 3.4.  Therefore, changed to Eliquis.  · On 3/25/2020, unremarkable: Lupus anticoagulant, beta-2 glycoprotein antibodies.  Anticardiolipin antibodies also felt to be unremarkable with IgG and IgM both at 15 (negative is <15.  Indeterminate is 15-20).  No signs of recurrent DVT.    *CT angiogram chest 3/24/2020 (LLE acute DVT found 3/24/2020): Mild increased opacity LLL may represent developing infiltrate versus atelectasis.  Radiologist recommended follow-up.  · CT chest 5/1/2020: Resolution of groundglass LLL infiltrate from the 3/24/2020 CT.  A few mildly enlarged mediastinal nodes are less prominent than on the 3/18/2020 CT.  No new abnormalities.  Plan no more follow-up CT scans.    *Persistent cough.  He has seen Dr. Maher Sayied for this.    He did not complain of this today.    *Previously complained of emesis occurring 5 hours after eating on average once every month or 2.  No nausea otherwise.  Denies dysphagia or odynophagia.    Unchanged.  Occurring on average once every couple of months.  He did not complain of this today    *Iron deficiency anemia  · Hb mostly around 11  · On 4/15/2019, ferritin 29.7, 13% saturation.  Serum folate normal.  · On oral iron daily through PCP.  · On 8/23/19, ferritin 65, 14%.   · Increased oral iron.   · On 10/15/19, ferritin 72, 10%.  · On 10/25/2019, stopped oral iron since it was not helping.    · Oral iron not effective due to poor absorption  · 2 doses Injectafer.  · On 12/13/2019,  ferritin 708, 15% saturation, Hb 10.4.  · Therefore, no IV iron.  Monitor.  · On 5/5/2020, ferritin 346, 15% saturation, Hb 9.9.  Therefore, no need for IV iron at this time.  · On 7/7/2020, Hb up to 11.7.  Iron labs normal.  · Admission 12/15/2020: Hb 6.6.  Ferritin 40, iron saturation 17%.  Discharged on 12/21/2020 with Hb 7.1, which fell from 7.9 on 12/18/2020, from 8.9 in the early morning 12/18/2020.  The drop in Hb was thought to be due to hemorrhoidal bleeding related to Eliquis.  Eliquis was stopped.  Hemorrhoidal repair planned by Dr. Flynn Julio after the holidays.  Was given Venofer 300 mg on 12/17/2020 by Dr. Ross of our practice  · On 12/29/2020, ferritin 176, 13% saturation, Hb 8.4, reticulate hemoglobin low at 25.7.  Suspect he would benefit from some more iron.  Given 1 dose Injectafer.  · On 2/2/2021, ferritin 317, 17% saturation, Hb 10.3.  Therefore, Hb gradually improved since the 1 dose of Injectafer.  · 3/2/2021: Ferritin iron 93, 11% saturation, Hb 11.9.  1 dose Injectafer.  · 3/23/2021: Ferritin 471, 20% saturation, Hb 10.8  · 7/6/2021: Ferritin 329, 21% saturation, Hb 10.8  · 9/28/2021: Ferritin 230, 20% saturation, Hb 11.4.  No need for IV iron.    *Hemorrhoidal bleeding with Hb down to 6.6, requiring admission, 12/15/2020.  Thought to be related to Eliquis.  · Eliquis was stopped.  · Hemorrhoidal repair by Dr. Flynn Julio, 12/23/2020  · Eliquis subsequently restarted with no more issues with bleeding.  · 1 episode of dark stools yesterday, 9/27/2021.  Told him if he notices more of this he should contact Dr. Sanchez.    *9/28/2021: Change in stool frequency.  · Was having a bowel movement 3 times per week.  For the past week has been having 3 formed bowel movements per day.  I told him if this persists he should discuss with Dr. Sanchez.    *Source of iron deficiency.  · Last colonoscopy 2/26/2019 by Dr. Millan.  Last EGD 6/13/2017 by Dr. Gonzalez.  · Suspect he has an absorption issue due  to previous esophageal surgery.    *B12 deficiency.  · On 6/6/2019, B12 <150  · On B12 injections through PCP.  B12 on 5/5/2020 normal at 694  B12 on 2/2/2021, 789    *Anemia for reasons in addition to iron deficiency and B12 deficiency  · Baseline Hb mostly 10.5-11.5.  · On 5/5/2020, unremarkable: RBC folate, iron labs, B12, haptoglobin, TB, LDH, direct Maki.  · Creatinine baseline is 0.9-1.2   · Hb 9.9 on 5/5/2020  · On 7/7/2020, Hb up to 11.7.  Return to prior follow-up plan.  · On 2/2/2021, B12 and RBC folate unremarkable  · 3/23/2021: Hb 10.8 despite normal iron stores.  · 5/25/2021, Hb 10.7 with normal iron labs  · 7/6/2021, Hb 10.8 with normal iron labs    *Leukocytopenia  · New issue on 3/23/2021, WBC 3.38, based on recent labs, but WBC has been as low as 2.9 December 2019  · WBC 3.9    *Neutropenia  · ANC 1590 on 3/23/2021 (previously ANC has been as low as 1480 with WBC in the low normal range)  · ANC 2100    *Thrombocytopenia  · New issue on 3/23/2021, , based on recent labs, but PLT has been as low as 119 October 2019  ·     *He had a white blood cell count in the upper 3s and a hemoglobin in the upper 12s with a normal platelet count prior to beginning chemotherapy.     PLAN:  · MD CBC stat iron labs 3 months  · Baseline Hb when not in the hospital mostly 9.5-11.5  · Continue Eliquis  · On 7/6/2021, he stated he had been forgetting his morning dose.  He insists he will do better about taking twice per day dosing  · Note on 3/23/2021 visit with Dr. Christine he planned a 1 year follow-up and CT chest and abdomen 1 week prior.    · hemorrhoids have been repaired.  Last drop of Hb was down to 7.1 on 12/21/2020.  (Hemorrhoidal bleeding)  · If more dark stools, contact Dr. Sanchez.  If more significant changes in bowel movements, contact Dr. Sanchez.    Prior plan was:  · MD every 6-month.  · No more surveillance CT scans.  · He does not have a port.    I have discussed with him if Hb drops and  remains around 9 or so, we may want to plan a bone marrow biopsy

## 2021-09-28 ENCOUNTER — LAB (OUTPATIENT)
Dept: OTHER | Facility: HOSPITAL | Age: 71
End: 2021-09-28

## 2021-09-28 ENCOUNTER — OFFICE VISIT (OUTPATIENT)
Dept: ONCOLOGY | Facility: CLINIC | Age: 71
End: 2021-09-28

## 2021-09-28 VITALS
RESPIRATION RATE: 18 BRPM | SYSTOLIC BLOOD PRESSURE: 157 MMHG | TEMPERATURE: 97.3 F | HEART RATE: 49 BPM | HEIGHT: 77 IN | OXYGEN SATURATION: 95 % | WEIGHT: 227.4 LBS | BODY MASS INDEX: 26.85 KG/M2 | DIASTOLIC BLOOD PRESSURE: 77 MMHG

## 2021-09-28 DIAGNOSIS — D64.9 ANEMIA, UNSPECIFIED TYPE: Primary | ICD-10-CM

## 2021-09-28 DIAGNOSIS — C15.9 ADENOCARCINOMA OF ESOPHAGUS (HCC): ICD-10-CM

## 2021-09-28 LAB
BASOPHILS # BLD AUTO: 0.02 10*3/MM3 (ref 0–0.2)
BASOPHILS NFR BLD AUTO: 0.5 % (ref 0–1.5)
DEPRECATED RDW RBC AUTO: 47.2 FL (ref 37–54)
EOSINOPHIL # BLD AUTO: 0.21 10*3/MM3 (ref 0–0.4)
EOSINOPHIL NFR BLD AUTO: 5.4 % (ref 0.3–6.2)
ERYTHROCYTE [DISTWIDTH] IN BLOOD BY AUTOMATED COUNT: 13.2 % (ref 12.3–15.4)
FERRITIN SERPL-MCNC: 229.7 NG/ML (ref 30–400)
HCT VFR BLD AUTO: 36 % (ref 37.5–51)
HGB BLD-MCNC: 11.4 G/DL (ref 13–17.7)
HGB RETIC QN AUTO: 32.6 PG (ref 29.8–36.1)
IMM GRANULOCYTES # BLD AUTO: 0.01 10*3/MM3 (ref 0–0.05)
IMM GRANULOCYTES NFR BLD AUTO: 0.3 % (ref 0–0.5)
IMM RETICS NFR: 9.1 % (ref 3–15.8)
IRON 24H UR-MRATE: 63 MCG/DL (ref 59–158)
IRON SATN MFR SERPL: 20 % (ref 20–50)
LYMPHOCYTES # BLD AUTO: 1.18 10*3/MM3 (ref 0.7–3.1)
LYMPHOCYTES NFR BLD AUTO: 30.4 % (ref 19.6–45.3)
MCH RBC QN AUTO: 31.1 PG (ref 26.6–33)
MCHC RBC AUTO-ENTMCNC: 31.7 G/DL (ref 31.5–35.7)
MCV RBC AUTO: 98.1 FL (ref 79–97)
MONOCYTES # BLD AUTO: 0.36 10*3/MM3 (ref 0.1–0.9)
MONOCYTES NFR BLD AUTO: 9.3 % (ref 5–12)
NEUTROPHILS NFR BLD AUTO: 2.1 10*3/MM3 (ref 1.7–7)
NEUTROPHILS NFR BLD AUTO: 54.1 % (ref 42.7–76)
NRBC BLD AUTO-RTO: 0 /100 WBC (ref 0–0.2)
PLATELET # BLD AUTO: 142 10*3/MM3 (ref 140–450)
PMV BLD AUTO: 9.2 FL (ref 6–12)
RBC # BLD AUTO: 3.67 10*6/MM3 (ref 4.14–5.8)
RETICS # AUTO: 0.03 10*6/MM3 (ref 0.02–0.13)
RETICS/RBC NFR AUTO: 0.89 % (ref 0.7–1.9)
TIBC SERPL-MCNC: 310 MCG/DL (ref 298–536)
TRANSFERRIN SERPL-MCNC: 208 MG/DL (ref 200–360)
WBC # BLD AUTO: 3.88 10*3/MM3 (ref 3.4–10.8)

## 2021-09-28 PROCEDURE — 84466 ASSAY OF TRANSFERRIN: CPT | Performed by: INTERNAL MEDICINE

## 2021-09-28 PROCEDURE — 85046 RETICYTE/HGB CONCENTRATE: CPT | Performed by: INTERNAL MEDICINE

## 2021-09-28 PROCEDURE — 85025 COMPLETE CBC W/AUTO DIFF WBC: CPT | Performed by: INTERNAL MEDICINE

## 2021-09-28 PROCEDURE — 82728 ASSAY OF FERRITIN: CPT | Performed by: INTERNAL MEDICINE

## 2021-09-28 PROCEDURE — 99214 OFFICE O/P EST MOD 30 MIN: CPT | Performed by: INTERNAL MEDICINE

## 2021-09-28 PROCEDURE — 83540 ASSAY OF IRON: CPT | Performed by: INTERNAL MEDICINE

## 2021-09-28 PROCEDURE — 36415 COLL VENOUS BLD VENIPUNCTURE: CPT

## 2021-09-30 ENCOUNTER — CLINICAL SUPPORT (OUTPATIENT)
Dept: INTERNAL MEDICINE | Age: 71
End: 2021-09-30

## 2021-09-30 ENCOUNTER — TELEPHONE (OUTPATIENT)
Dept: INTERNAL MEDICINE | Age: 71
End: 2021-09-30

## 2021-09-30 DIAGNOSIS — I89.0 LYMPHEDEMA: Primary | ICD-10-CM

## 2021-09-30 PROCEDURE — 96372 THER/PROPH/DIAG INJ SC/IM: CPT | Performed by: INTERNAL MEDICINE

## 2021-09-30 RX ADMIN — CYANOCOBALAMIN 1000 MCG: 1000 INJECTION, SOLUTION INTRAMUSCULAR; SUBCUTANEOUS at 10:44

## 2021-10-08 ENCOUNTER — TELEPHONE (OUTPATIENT)
Dept: INTERNAL MEDICINE | Age: 71
End: 2021-10-08

## 2021-10-08 DIAGNOSIS — R41.3 MEMORY CHANGE: Primary | ICD-10-CM

## 2021-10-08 NOTE — TELEPHONE ENCOUNTER
Caller: Jose Hurtado    Relationship: Self    Best call back number: [phone]    What is the medical concern/diagnosis: MENTAL HEALTH MEMORY ISSUES     What specialty or service is being requested: PHYSIATRIST

## 2021-10-19 ENCOUNTER — HOSPITAL ENCOUNTER (OUTPATIENT)
Dept: PHYSICAL THERAPY | Facility: HOSPITAL | Age: 71
Setting detail: THERAPIES SERIES
Discharge: HOME | End: 2021-10-19

## 2021-10-19 DIAGNOSIS — I89.0 LYMPHEDEMA: Primary | ICD-10-CM

## 2021-10-19 PROCEDURE — 97162 PT EVAL MOD COMPLEX 30 MIN: CPT

## 2021-10-19 NOTE — THERAPY EVALUATION
Physical Therapy Lymphedema Initial Evaluation  [facility]     Patient Name: Jose Hurtado  : 1950  MRN: [id number]  Today's Date: 10/19/2021      Visit Date: 10/19/2021    Visit Dx:    ICD-10-CM ICD-9-CM   1. Lymphedema  I89.0 457.1       Patient Active Problem List   Diagnosis   • Adenocarcinoma of esophagus (HCC)   • Adenomatous polyp of colon   • Malignant neoplasm of prostate (HCC)   • RLS (restless legs syndrome)   • History of DVT (deep vein thrombosis)   • Recurrent major depressive disorder, in partial remission (HCC)   • Hypertension   • Anemia   • Insomnia due to other mental disorder   • Peripheral polyneuropathy   • B12 deficiency   • Postsurgical malabsorption   • Lymphedema   • Iron deficiency   • Gastroparesis   • Acute deep vein thrombosis (DVT) of popliteal vein of left lower extremity (HCC)   • Tremor of both hands   • Gastrointestinal hemorrhage   • Acute blood loss anemia   • Pancytopenia (HCC)   • Chronic venous hypertension due to DVT   • COPD (chronic obstructive pulmonary disease) (HCC)   • Bleeding hemorrhoid   • Malabsorption of iron   • Iron deficiency anemia   • Pleuritic chest pain   • History of esophageal cancer   • Abnormal CT scan   • Generalized weakness   • Chronic anticoagulation   • Urinary tract infection due to extended-spectrum beta lactamase (ESBL) producing Escherichia coli   • CKD (chronic kidney disease) stage 2, GFR 60-89 ml/min        Past Medical History:   Diagnosis Date   • Acute deep vein thrombosis (DVT) of popliteal vein of left lower extremity (CMS/HCC) 2020   • Anemia    • Anti-phospholipid syndrome (CMS/HCC)     On lifelong anticoagulation therapy   • Bladder disorder     LEAKAGE   ON MED  wers pads   • Bleeding hemorrhoids    • Community acquired pneumonia     HISTORY OF IN    • Deep venous thrombosis (CMS/HCC) ,     Left lower extremity multiple   • Depression    • Esophageal carcinoma (CMS/HCC) 2014    had chemo and  radiation prior surgery   • Hemorrhoids    • HH (hiatus hernia)    • History of atrial fibrillation 2015    ONE EPISODE WHILE HOSPITALIZED   • History of kidney stones    • History of nephrolithiasis    • History of pancreatitis     PT STATES MANY YEARS AGO   • History of radiation therapy    • History of transfusion    • Hypertension    • Long-term (current) use of anticoagulants, INR goal 2.0-3.0    • Lymphedema    • Malignant neoplasm of prostate (CMS/HCC)    • Other hyperlipidemia 1/30/2018 January 30, 2018 lipid panel risk 12.8%   • Restless legs syndrome    • Sleep apnea     OCCASIONALLY WEARS CPAP   • Squamous carcinoma     on the head        Past Surgical History:   Procedure Laterality Date   • APPENDECTOMY  1950   • BRONCHOSCOPY      (Diagnostic)   • CATARACT EXTRACTION Bilateral 2014   • COLONOSCOPY  12/15/2014    Complete / Description: EH, IH, torts, stool, follow-up colonoscopy due in 5 years.   • COLONOSCOPY N/A 6/13/2017    non-thrombosed external hemorrhoids, normal examined ileum, IH   • COLONOSCOPY N/A 2/26/2019    Procedure: COLONOSCOPY with Cold Polypectomy;  Surgeon: Mulugeta Millan MD;  Location: [facility] ENDOSCOPY;  Service: Gastroenterology   • COLONOSCOPY N/A 12/16/2020    Procedure: COLONOSCOPY to cecum into TI;  Surgeon: Mesha Sanchez MD;  Location: [facility] ENDOSCOPY;  Service: Gastroenterology;  Laterality: N/A;  pre: lower GI bleed  post: hemorrhoids    • CYSTOSCOPY W/ LASER LITHOTRIPSY     • ENDOSCOPY N/A 6/13/2017    Procedure: ESOPHAGOGASTRODUODENOSCOPY WITH COLD BIOPSY;  Surgeon: Lake Gonzalez MD;  Location: [facility] ENDOSCOPY;  Service:    • ESOPHAGECTOMY      April 2015, stage IIB esophageal carcinoma, sub-total resection.   • ESOPHAGECTOMY      Esophagectomy Subtotal Ulmer Joe Procedure   • EXCISION LESION  08/2012    Removal of Squamous Cell CA on Head   • HAMMER TOE REPAIR  09/2014    Hammertoe Operation (Each Toe), 10/2014   • HAMMER TOE REPAIR Left 10/3/2017     Procedure: Left second third and fourth distal interphalangeal joint resection with flexor tenotomy;  Surgeon: Mulugeta Lira MD;  Location: [facility] OR [facility];  Service:    • HEMORRHOIDECTOMY N/A 12/23/2020    Procedure: HEMORRHOIDECTOMY;  Surgeon: Billy Julio Jr., MD;  Location: [facility] MAIN OR;  Service: General;  Laterality: N/A;   • HERNIA REPAIR      incisional   • JEJUNOSTOMY      Laparoscopic   • JEJUNOSTOMY      tube removal    • KNEE SURGERY Bilateral 1967, 1973, 1981   • PATELLA SURGERY Left     removed   • PILONIDAL CYST / SINUS EXCISION     • WA TOTAL KNEE ARTHROPLASTY Right 3/26/2018    Procedure: TOTAL KNEE ARTHROPLASTY;  Surgeon: Renny Solis MD;  Location: [facility] OR;  Service: Orthopedics   • PROSTATECTOMY  2010   • PYLOROPLASTY     • SPINAL FUSION  02/1998    C 5,6   • TONSILLECTOMY     • UPPER GASTROINTESTINAL ENDOSCOPY  12/15/2014    LA Grade D esophagitis, Pardo's, HH, multiple duodenal ulcers   • VENTRAL/INCISIONAL HERNIA REPAIR N/A 4/14/2016    Procedure: VENTRAL/INCISIONAL HERNIA REPAIR, open, with mesh, and component separation;  Surgeon: Darren Rivas MD;  Location: [facility] OR;  Service:        Visit Dx:    ICD-10-CM ICD-9-CM   1. Lymphedema  I89.0 457.1        Patient History     Row Name 10/19/21 1400             History    Chief Complaint Swelling  -KD      Date Current Problem(s) Began --  worse last couple of months  -KD      Brief Description of Current Complaint States he's been wearing 30-40mmHg compression stockings daily, but legs, jolly left, are becoming more edematous again  -KD      Patient/Caregiver Goals Decrease swelling  -KD      How has patient tried to help current problem? daily compression wear  -KD              Pain     Pain Location Leg  bilat lower  -KD      Pain at Present 4  -KD      Difficulties with ADL's? Lymphedema minimally affects routine home related activities, leisure activities, and sleep; moderately affects fit of  clothing/shoes.  -KD              Fall Risk Assessment    Any falls in the past year: Yes  -KD      Number of falls reported in the last 12 months 1  -KD      Factors that contributed to the fall: Tripped  -KD              Services    Are you currently receiving Home Health services No  -KD              Daily Activities    Primary Language English  -KD      How does patient learn best? Demonstration  -KD      Teaching needs identified Management of Condition  -KD      Patient is concerned about/has problems with Difficulty with self care (i.e. bathing, dressing, toileting:; Performing home management (household chores, shopping, care of dependents)  -KD      Does patient have problems with the following? Depression  -KD      Barriers to learning None  -KD      Functional Status mobility issues preventing performance of daily activities  -KD      Pt Participated in POC and Goals Yes  -KD              Safety    Are you being hurt, hit, or frightened by anyone at home or in your life? No  -KD      Are you being neglected by a caregiver No  -KD      Have you had any of the following issues with Depression  -KD            User Key  (r) = Recorded By, (t) = Taken By, (c) = Cosigned By    Initials Name Provider Type    KD Ceci Ruby, PT Physical Therapist                 Lymphedema     Row Name 10/19/21 1400             Subjective Pain    Able to rate subjective pain? yes  -KD      Pre-Treatment Pain Level 4  -KD      Subjective Pain Comment lower legs  -KD              Subjective Comments    Subjective Comments States he has been wearing compression stockings daily but legs are still getting worse, difficult to don the class 2 stockings.  -KD              Lymphedema Assessment    Lymphedema Classification RLE:; LLE:; stage 2 (Spontaneously Irreversible)  -KD      Lymphedema Surgery Comments Prostatectomy 2010, esophagectomy 4/2015  -KD      Chemo Received yes  -KD      Chemo Treatments #/Timeframe 5X  -KD       "Radiation Therapy Received yes  -KD      Radiation Treatments #/Timeframe 26  -KD      Infections or Cellulitis? no  -KD              Physical Concerns    The amount of pain associated with my lymphedema is: 1  -KD      The amount of limb heaviness associated with my lymphedema is: 2  -KD      The amount of skin tightness associated with my lymphedema is: 3  -KD      The size of my swollen limb(s) seems: 3  -KD      Lymphedema affects the movement of my swollen limb(s): 2  -KD      The strength in my swollen limb(s) is: 3  -KD              Psychosocial Concerns    Lymphedema affects my body image (i.e., \"how I think I look\"). 2  -KD      Lymphedema affects my socializing with others. 1  -KD      Lymphedema affects my intimate relations with spouse or partner (rate 0 if not applicable 0  -KD      Lymphedema \"gets me down\" (i.e., depression, frustration, or anger) 0  -KD      I must rely on others for help due to my lymphedema. 0  -KD      I know what to do to manage my lymphedema 0  -KD              Functional Concerns    Lymphedema affects my ability to perform self-care activities (i.e. eating, dressing, hygiene) 0  -KD      Lymphedema affects my ability to perform routine home or work-related activities. 1  -KD      Lymphedema affects my performance of preferred leisure activities. 1  -KD      Lymphedema affects proper fit of clothing/shoes 2  -KD      Lymphedema affects my sleep 1  -KD              Posture/Observations    Posture/Observations Comments Pt amb independently with gait abnormality; lower leg edema, jolly left  -KD              General ROM    GENERAL ROM COMMENTS B LE gen WFL  -KD              MMT (Manual Muscle Testing)    General MMT Comments Gen at least 4/5 LEs  -KD              Lymphedema Edema Assessment    Ptting Edema Category By grade out of 4  -KD      Pitting Edema + 2/4  -KD      Edema Assessment Comment Min to mod pitting edema lower legs, jolly left  -KD              Skin " Changes/Observations    Skin Observations Comment Skin min to mod dry, intact, normal temp  -KD              Lymphedema Sensation    Lymphedema Sensation Comments Reports bilat feet numbness  -KD              Lymphedema Measurements    Measurement Type(s) Circumferential  -KD      Circumferential Areas Lower extremities  -KD              BLE Circumferential (cm)    Measurement Location 1 Knee  -KD      Left 1 43 cm  -KD      Right 1 40 cm  -KD      Measurement Location 2 10cm below knee  -KD      Left 2 41 cm  -KD      Right 2 38.7 cm  -KD      Measurement Location 3 20cm below knee  -KD      Left 3 44.5 cm  -KD      Right 3 38.5 cm  -KD      Measurement Location 4 30cm below knee  -KD      Left 4 39.5 cm  -KD      Right 4 33.7 cm  -KD      Measurement Location 5 Ankle  -KD      Left 5 32.5 cm  -KD      Right 5 30 cm  -KD      Measurement Location 6 Midfoot  -KD      Left 6 25 cm  -KD      Right 6 24 cm  -KD      LLE Circumferential Total 225.5 cm  -KD      RLE Circumferential Total 204.9 cm  -KD              Lymphedema Life Impact Scale Totals    A.  Total Q1 - Q17 (Do not include Q18) 22  -KD      B.  Total number of questions answered (Q1-Q17) 17  -KD      C. Divide A by B 1.29  -KD      D. Multiple C by 25 32.25  -KD            User Key  (r) = Recorded By, (t) = Taken By, (c) = Cosigned By    Initials Name Provider Type    Ceci Esposito PT Physical Therapist                                Therapy Education  Education Details: Reviewed treatment protocol with pt; also showed him a sample velcro device which may be a better solution for him for long term compression  Given: Symptoms/condition management, Edema management  Program: New, Reinforced  How Provided: Verbal, Demonstration  Provided to: Patient  Level of Understanding: Verbalized       OP Exercises     Row Name 10/19/21 1400             Subjective Comments    Subjective Comments States he has been wearing compression stockings daily but legs are  still getting worse, difficult to don the class 2 stockings.  -KD              Subjective Pain    Able to rate subjective pain? yes  -KD      Pre-Treatment Pain Level 4  -KD      Subjective Pain Comment lower legs  -KD            User Key  (r) = Recorded By, (t) = Taken By, (c) = Cosigned By    Initials Name Provider Type    Ceci Esposito, PT Physical Therapist                             PT OP Goals     Row Name 10/19/21 1400          PT Short Term Goals    STG Date to Achieve 11/18/21  -KD     STG 1 Pt demo awareness of condition and precautions for improved prevention, management, care of symptoms, and ease of transition to self-care of condition.  -KD     STG 1 Progress New  -KD     STG 2 Pt demo decreased net edema of >/=5-10cm for decreased edema symptoms, decreased risk of infection, and improved skin care.  -KD     STG 2 Progress New  -KD     STG 3 Patient independent and compliant with home exercise program (as able) focused on range of motion and flexibility to improve lymphatic flow and discomfort.  -KD     STG 3 Progress New  -KD            Long Term Goals    LTG Date to Achieve 03/22/21  -KD     LTG 1 Pt/family independent with self care techniques for self-management of condition.  -KD     LTG 1 Progress New  -KD     LTG 2 Pt demo decreased net edema of >/=10-20cm for decreased edema symptoms, decreased risk of infection, and improved skin care.  -KD     LTG 2 Progress New  -KD     LTG 3 Pt/family independent with compression garments as indicated for self-management of condition.  -KD     LTG 3 Progress New  -KD            Time Calculation    PT Goal Re-Cert Due Date 01/17/22  -KD           User Key  (r) = Recorded By, (t) = Taken By, (c) = Cosigned By    Initials Name Provider Type    Ceci Esposito, PT Physical Therapist                 PT Assessment/Plan     Row Name 10/19/21 6524          PT Assessment    Assessment Comments Pt presents in the Lymphedema Clinic today with min to mod  increased edema of bilat lower legs. Total circumferential measurements: R YS=417.9cm; L TN=174.5cm. These measurements have increased by 13.4cm on right and by 22.4cm on the left compared to measurements when last seen on 3/12/21.  Skin on lower legs is min to mod dry, clean, intact, normal temp.Sensation is altered in feet. General mobility is functional. Lymphedema Lifestyle Impact Scale is 33.25.  Functional concerns are min interference with routine home and lesire activities as well as sleep, mod interference with fit of clothing/shoes . Pt has been seen here in the past and is quite familiar with treatment protocol. Feel he will probably have better outcomes wearing velcro compression devices vs stockings at this point.Patient is a good candidate for Complete Decongestive Therapy to reduce swelling, protect/promote skin integrity, decrease risk of infection, and instruction to patient/family of self-management skills.  -KD     Please refer to paper survey for additional self-reported information Yes  -KD     Rehab Potential Good  -KD     Patient/caregiver participated in establishment of treatment plan and goals Yes  -KD     Patient would benefit from skilled therapy intervention Yes  -KD            PT Plan    PT Frequency 5x/week  -KD     Predicted Duration of Therapy Intervention (PT) 4 weeks  -KD     Planned CPT's? PT EVAL MOD COMPLELITY: 66756; PT RE-EVAL: 11882; PT THER ACT EA 15 MIN: 91741; PT MANUAL THERAPY EA 15 MIN: 86691; PT SELF CARE/HOME MGMT/TRAIN EA 15: 82083  -KD     Physical Therapy Interventions (Optional Details) bandaging; home exercise program; manual lymphatic drainage; patient/family education; other (see comments)  skin care  -KD     PT Plan Comments See pt per PT Plan.  -KD           User Key  (r) = Recorded By, (t) = Taken By, (c) = Cosigned By    Initials Name Provider Type    Ceci Esposito, PT Physical Therapist                             Time Calculation:   Start Time:  1320  Stop Time: 1358  Time Calculation (min): 38 min   Therapy Charges for Today     Code Description Service Date Service Provider Modifiers Qty    06692898660 HC PT EVAL MOD COMPLEXITY 3 10/19/2021 Ceci Ruby, PT GP 1                    Ceci Ruby, PT  10/19/2021

## 2021-10-28 ENCOUNTER — CLINICAL SUPPORT (OUTPATIENT)
Dept: INTERNAL MEDICINE | Age: 71
End: 2021-10-28

## 2021-10-28 PROCEDURE — 96372 THER/PROPH/DIAG INJ SC/IM: CPT | Performed by: INTERNAL MEDICINE

## 2021-10-28 RX ADMIN — CYANOCOBALAMIN 1000 MCG: 1000 INJECTION, SOLUTION INTRAMUSCULAR; SUBCUTANEOUS at 10:26

## 2021-11-10 ENCOUNTER — PREP FOR SURGERY (OUTPATIENT)
Dept: OTHER | Facility: HOSPITAL | Age: 71
End: 2021-11-10

## 2021-11-10 ENCOUNTER — OFFICE VISIT (OUTPATIENT)
Dept: SURGERY | Facility: CLINIC | Age: 71
End: 2021-11-10

## 2021-11-10 VITALS
BODY MASS INDEX: 26.81 KG/M2 | DIASTOLIC BLOOD PRESSURE: 67 MMHG | WEIGHT: 227.07 LBS | OXYGEN SATURATION: 97 % | RESPIRATION RATE: 16 BRPM | HEART RATE: 67 BPM | SYSTOLIC BLOOD PRESSURE: 138 MMHG | HEIGHT: 77 IN

## 2021-11-10 DIAGNOSIS — R13.10 DYSPHAGIA, UNSPECIFIED TYPE: Primary | ICD-10-CM

## 2021-11-10 DIAGNOSIS — R79.1 ABNORMAL COAGULATION PROFILE: ICD-10-CM

## 2021-11-10 DIAGNOSIS — C15.9 ADENOCARCINOMA OF ESOPHAGUS (HCC): ICD-10-CM

## 2021-11-10 PROCEDURE — 99212 OFFICE O/P EST SF 10 MIN: CPT | Performed by: THORACIC SURGERY (CARDIOTHORACIC VASCULAR SURGERY)

## 2021-11-10 RX ORDER — SODIUM CHLORIDE 0.9 % (FLUSH) 0.9 %
3-10 SYRINGE (ML) INJECTION AS NEEDED
Status: CANCELLED | OUTPATIENT
Start: 2021-11-18

## 2021-11-10 RX ORDER — SODIUM CHLORIDE 0.9 % (FLUSH) 0.9 %
3 SYRINGE (ML) INJECTION EVERY 12 HOURS SCHEDULED
Status: CANCELLED | OUTPATIENT
Start: 2021-11-18

## 2021-11-10 NOTE — PROGRESS NOTES
"Chief Complaint  Retching    Subjective          Jose Hurtado presents to [facility] THORACIC SURGERY for trouble swallowing.  History of Present Illness     71-year-old male who is 6-1/2 years out from a Iver Joe esophagogastrectomy for a T2 a N0 M0 adenocarcinoma the lower esophagus.  He was seen in March with CT scans showing no evidence of recurrent cancer.  Since then he has developed retching.  He reports retching 1 or 2 times per week.  He reports that he does not actually vomit any food.  He has had no hematemesis.  He has had no blood in his stool.  He has had no weight loss.  He describes this is retching and does not describe it is food being stuck in his esophagus.    Objective   Vital Signs:   /67 (BP Location: Right arm, Patient Position: Sitting, Cuff Size: Adult)   Pulse 67   Resp 16   Ht 195.6 cm (77.01\")   Wt 103 kg (227 lb 1.2 oz)   SpO2 97%   BMI 26.92 kg/m²     Physical Exam     Neck: Soft supple no masses no adenopathy    Pulmonary: Lungs are clear to auscultation with equal breath sounds bilaterally    Cardiac: Regular rate and rhythm.  No murmur or gallop.  No dependent edema.    Abdomen soft nontender no masses no organomegaly good bowel sounds    Result Review :   No tests for review today         Assessment and Plan    Diagnoses and all orders for this visit:    1. Dysphagia, unspecified type (Primary)  -     Case request    2. Adenocarcinoma of esophagus (HCC)  -     Case request    Patient reports retching but I wonder if this is not just a dysphagia.  He may have developed a anastomotic stricture.  I have recommended esophagoscopy with possible dilation.  He is aware this procedure.  I have explained the procedure as well as the risks and benefits.  I have answered all of his questions to his satisfaction.  He is requested that we proceed.    Case request and preoperative orders have been placed in [facility].  Patient will be scheduled for EGD with possible " dilation next week at [facility].  I will keep you informed of his progress.      I spent 15 minutes caring for Jose on this date of service. This time includes time spent by me in the following activities:preparing for the visit, performing a medically appropriate examination and/or evaluation , counseling and educating the patient/family/caregiver, ordering medications, tests, or procedures, referring and communicating with other health care professionals , documenting information in the medical record, independently interpreting results and communicating that information with the patient/family/caregiver and care coordination  Follow Up   Return for Return to office after procedure.  Patient was given instructions and counseling regarding his condition or for health maintenance advice. Please see specific information pulled into the AVS if appropriate.

## 2021-11-10 NOTE — H&P (VIEW-ONLY)
"Chief Complaint  Retching    Subjective          Jose Hurtado presents to [facility] THORACIC SURGERY for trouble swallowing.  History of Present Illness     71-year-old male who is 6-1/2 years out from a Iver Joe esophagogastrectomy for a T2 a N0 M0 adenocarcinoma the lower esophagus.  He was seen in March with CT scans showing no evidence of recurrent cancer.  Since then he has developed retching.  He reports retching 1 or 2 times per week.  He reports that he does not actually vomit any food.  He has had no hematemesis.  He has had no blood in his stool.  He has had no weight loss.  He describes this is retching and does not describe it is food being stuck in his esophagus.    Objective   Vital Signs:   /67 (BP Location: Right arm, Patient Position: Sitting, Cuff Size: Adult)   Pulse 67   Resp 16   Ht 195.6 cm (77.01\")   Wt 103 kg (227 lb 1.2 oz)   SpO2 97%   BMI 26.92 kg/m²     Physical Exam     Neck: Soft supple no masses no adenopathy    Pulmonary: Lungs are clear to auscultation with equal breath sounds bilaterally    Cardiac: Regular rate and rhythm.  No murmur or gallop.  No dependent edema.    Abdomen soft nontender no masses no organomegaly good bowel sounds    Result Review :   No tests for review today         Assessment and Plan    Diagnoses and all orders for this visit:    1. Dysphagia, unspecified type (Primary)  -     Case request    2. Adenocarcinoma of esophagus (HCC)  -     Case request    Patient reports retching but I wonder if this is not just a dysphagia.  He may have developed a anastomotic stricture.  I have recommended esophagoscopy with possible dilation.  He is aware this procedure.  I have explained the procedure as well as the risks and benefits.  I have answered all of his questions to his satisfaction.  He is requested that we proceed.    Case request and [facility].  Patient will be scheduled for EGD with possible " dilation next week at [facility].  I will keep you informed of his progress.      I spent 15 minutes caring for Jose on this date of service. This time includes time spent by me in the following activities:preparing for the visit, performing a medically appropriate examination and/or evaluation , counseling and educating the patient/family/caregiver, ordering medications, tests, or procedures, referring and communicating with other health care professionals , documenting information in the medical record, independently interpreting results and communicating that information with the patient/family/caregiver and care coordination  Follow Up   Return for Return to office after procedure.  Patient was given instructions and counseling regarding his condition or for health maintenance advice. Please see specific information pulled into the AVS if appropriate.

## 2021-11-15 ENCOUNTER — TRANSCRIBE ORDERS (OUTPATIENT)
Dept: SURGERY | Facility: CLINIC | Age: 71
End: 2021-11-15

## 2021-11-15 DIAGNOSIS — Z01.818 OTHER SPECIFIED PRE-OPERATIVE EXAMINATION: Primary | ICD-10-CM

## 2021-11-16 ENCOUNTER — LAB (OUTPATIENT)
Dept: LAB | Facility: HOSPITAL | Age: 71
End: 2021-11-16

## 2021-11-16 DIAGNOSIS — C15.9 ADENOCARCINOMA OF ESOPHAGUS (HCC): ICD-10-CM

## 2021-11-16 DIAGNOSIS — R13.10 DYSPHAGIA, UNSPECIFIED TYPE: ICD-10-CM

## 2021-11-16 LAB — SARS-COV-2 ORF1AB RESP QL NAA+PROBE: NOT DETECTED

## 2021-11-16 PROCEDURE — U0004 COV-19 TEST NON-CDC HGH THRU: HCPCS

## 2021-11-16 PROCEDURE — C9803 HOPD COVID-19 SPEC COLLECT: HCPCS

## 2021-11-18 ENCOUNTER — HOSPITAL ENCOUNTER (OUTPATIENT)
Facility: HOSPITAL | Age: 71
Setting detail: HOSPITAL OUTPATIENT SURGERY
Discharge: HOME | End: 2021-11-18
Attending: THORACIC SURGERY (CARDIOTHORACIC VASCULAR SURGERY) | Admitting: THORACIC SURGERY (CARDIOTHORACIC VASCULAR SURGERY)

## 2021-11-18 ENCOUNTER — ANESTHESIA (OUTPATIENT)
Dept: GASTROENTEROLOGY | Facility: HOSPITAL | Age: 71
End: 2021-11-18

## 2021-11-18 ENCOUNTER — APPOINTMENT (OUTPATIENT)
Dept: GENERAL RADIOLOGY | Facility: HOSPITAL | Age: 71
End: 2021-11-18

## 2021-11-18 ENCOUNTER — ANESTHESIA EVENT (OUTPATIENT)
Dept: GASTROENTEROLOGY | Facility: HOSPITAL | Age: 71
End: 2021-11-18

## 2021-11-18 VITALS
HEIGHT: 77 IN | HEART RATE: 59 BPM | OXYGEN SATURATION: 100 % | WEIGHT: 201 LBS | BODY MASS INDEX: 23.73 KG/M2 | SYSTOLIC BLOOD PRESSURE: 126 MMHG | DIASTOLIC BLOOD PRESSURE: 75 MMHG | RESPIRATION RATE: 17 BRPM

## 2021-11-18 DIAGNOSIS — R13.10 DYSPHAGIA, UNSPECIFIED TYPE: ICD-10-CM

## 2021-11-18 DIAGNOSIS — C15.9 ADENOCARCINOMA OF ESOPHAGUS (HCC): ICD-10-CM

## 2021-11-18 PROCEDURE — 74360 X-RAY GUIDE GI DILATION: CPT | Performed by: THORACIC SURGERY (CARDIOTHORACIC VASCULAR SURGERY)

## 2021-11-18 PROCEDURE — 43248 EGD GUIDE WIRE INSERTION: CPT | Performed by: THORACIC SURGERY (CARDIOTHORACIC VASCULAR SURGERY)

## 2021-11-18 PROCEDURE — 88305 TISSUE EXAM BY PATHOLOGIST: CPT | Performed by: THORACIC SURGERY (CARDIOTHORACIC VASCULAR SURGERY)

## 2021-11-18 PROCEDURE — C1769 GUIDE WIRE: HCPCS | Performed by: THORACIC SURGERY (CARDIOTHORACIC VASCULAR SURGERY)

## 2021-11-18 PROCEDURE — 25010000002 PROPOFOL 10 MG/ML EMULSION: Performed by: ANESTHESIOLOGY

## 2021-11-18 PROCEDURE — 74360 X-RAY GUIDE GI DILATION: CPT

## 2021-11-18 RX ORDER — PROPOFOL 10 MG/ML
VIAL (ML) INTRAVENOUS CONTINUOUS PRN
Status: DISCONTINUED | OUTPATIENT
Start: 2021-11-18 | End: 2021-11-18 | Stop reason: SURG

## 2021-11-18 RX ORDER — SODIUM CHLORIDE, SODIUM LACTATE, POTASSIUM CHLORIDE, CALCIUM CHLORIDE 600; 310; 30; 20 MG/100ML; MG/100ML; MG/100ML; MG/100ML
30 INJECTION, SOLUTION INTRAVENOUS CONTINUOUS PRN
Status: DISCONTINUED | OUTPATIENT
Start: 2021-11-18 | End: 2021-11-18 | Stop reason: HOSPADM

## 2021-11-18 RX ORDER — SODIUM CHLORIDE 0.9 % (FLUSH) 0.9 %
3 SYRINGE (ML) INJECTION EVERY 12 HOURS SCHEDULED
Status: DISCONTINUED | OUTPATIENT
Start: 2021-11-18 | End: 2021-11-18 | Stop reason: HOSPADM

## 2021-11-18 RX ORDER — SODIUM CHLORIDE 0.9 % (FLUSH) 0.9 %
3-10 SYRINGE (ML) INJECTION AS NEEDED
Status: DISCONTINUED | OUTPATIENT
Start: 2021-11-18 | End: 2021-11-18 | Stop reason: HOSPADM

## 2021-11-18 RX ADMIN — PROPOFOL 50 MCG/KG/MIN: 10 INJECTION, EMULSION INTRAVENOUS at 14:16

## 2021-11-18 RX ADMIN — SODIUM CHLORIDE, POTASSIUM CHLORIDE, SODIUM LACTATE AND CALCIUM CHLORIDE 30 ML/HR: 600; 310; 30; 20 INJECTION, SOLUTION INTRAVENOUS at 13:46

## 2021-11-18 NOTE — OP NOTE
ESOPHAGOGASTRODUODENOSCOPY WITH  DILATATION WITH FLUOROSCOPY  Progress Note    Jose Hurtado  11/18/2021    Pre-op Diagnosis:   Dysphagia, unspecified type [R13.10]  Adenocarcinoma of esophagus (HCC) [C15.9]       Post-Op Diagnosis Codes:     * Dysphagia, unspecified type [R13.10]     * Adenocarcinoma of esophagus (HCC) [C15.9]    Procedure/CPT® Codes:        Procedure(s):  ESOPHAGOGASTRODUODENOSCOPY WITH  DILATATION WITH FLUOROSCOPY    Surgeon(s):  Jose Christine III, MD    Anesthesia: Monitored Anesthesia Care    Staff:   Radiology Technologist: Na Tse RRT  Endo Technician: Michelle Peñaloza  Endo Nurse: Ruba Carty RN         Estimated Blood Loss: minimal    Urine Voided: * No values recorded between 11/18/2021  2:11 PM and 11/18/2021  2:42 PM *    Specimens:                Specimens     ID Source Type Tests Collected By Collected At Frozen?    A Esophagus Tissue · TISSUE PATHOLOGY EXAM   Jose Christine III, MD 11/18/21 7075     Description: bxs @ 30cm          Findings: Anastomosis identified at 30 cm from the incisors.  Mild to moderate esophagitis.  No evidence of recurrent cancer.  Anastomosis dilated to 17 mm without difficulty    Complications: none    Summary of procedure: Mr Hurtado was brought to the endoscopy suite and placed on the fluoroscopy table in the supine position.  Blood pressure EKG pulse oximetry and airway were monitored by the anesthesia service.  Supplemental oxygen and intravenous sedation were administered by anesthesia.  With the patient adequately sedated he was turned into the left lateral decubitus position.  The flexible scope was passed through the mouth and into the esophagus without difficulty.  The larynx and hypopharynx was mildly inflamed.  Upper esophagus appeared normal.  Anastomosis was identified at 30 cm from the incisors.  There was obvious scar tissue and some mild to moderate esophagitis but no ulceration and no tumor.  Multiple biopsies were  taken from the anastomosis at 30 cm.  The scope passed easily into the stomach.  I was able to direct the scope through the stomach and into the duodenum without difficulty.  There was some retained food in the gastric pouch.  The scope was passed out to the fourth portion of the duodenum.  The entire duodenum appeared normal.  Scope was pulled back up into the gastric pouch.  Guidewire was now threaded through the scope and positioned in the gastric pouch.  Scope was withdrawn.  Savory dilators were passed over the guidewire under fluoroscopic control.  We used 14 mm, 15 mm, 16 mm, and 17 mm dilators.  All dilators passed without difficulty.  The scope was reintroduced.  No obvious injury to the esophagus or stomach.  Scope was withdrawn and the patient tolerated the procedure well.    .        Dictated utilizing Dragon dictation          Jose Christine III, MD     Date: 11/18/2021  Time: 14:46 EST

## 2021-11-18 NOTE — ANESTHESIA POSTPROCEDURE EVALUATION
Patient: Jose Hurtado    Procedure Summary     Date: 11/18/21 Room / Location:  [facility] / [facility]    Anesthesia Start: 1416 Anesthesia Stop: 1456    Procedure: ESOPHAGOGASTRODUODENOSCOPY WITH  DILATATION WITH FLUOROSCOPY (N/A Esophagus) Diagnosis:       Dysphagia, unspecified type      Adenocarcinoma of esophagus (HCC)      (Dysphagia, unspecified type [R13.10])      (Adenocarcinoma of esophagus (HCC) [C15.9])    Surgeons: Jose Christine III, MD Provider: Eric Soliman MD    Anesthesia Type: MAC ASA Status: 4          Anesthesia Type: MAC    Vitals  Vitals Value Taken Time   BP 90/55 11/18/21 1450   Temp     Pulse 61 11/18/21 1450   Resp 14 11/18/21 1450   SpO2 98 % 11/18/21 1450           Post Anesthesia Care and Evaluation    Patient location during evaluation: PACU  Patient participation: complete - patient participated  Level of consciousness: awake and alert  Pain management: adequate  Airway patency: patent  Anesthetic complications: No anesthetic complications    Cardiovascular status: acceptable  Respiratory status: acceptable  Hydration status: acceptable    Comments: --------------------            11/18/21               1450     --------------------   BP:       90/55      Pulse:      61       Resp:       14       SpO2:      98%      --------------------

## 2021-11-18 NOTE — ANESTHESIA PREPROCEDURE EVALUATION
Anesthesia Evaluation     Patient summary reviewed and Nursing notes reviewed                Airway   Mallampati: II  TM distance: >3 FB  Neck ROM: full  Dental      Pulmonary    (+) pneumonia , a smoker Former, COPD, sleep apnea,   Cardiovascular     ECG reviewed  Rhythm: regular  Rate: normal    (+) hypertension, DVT, hyperlipidemia,       Neuro/Psych  (+) tremors, numbness, psychiatric history Anxiety and Depression,     GI/Hepatic/Renal/Endo    (+)  hiatal hernia, GI bleeding , renal disease CRI,     Musculoskeletal (-) negative ROS    Abdominal    Substance History - negative use     OB/GYN negative ob/gyn ROS         Other      history of cancer                    Anesthesia Plan    ASA 4     MAC     intravenous induction     Anesthetic plan, all risks, benefits, and alternatives have been provided, discussed and informed consent has been obtained with: patient.

## 2021-11-19 LAB
LAB AP CASE REPORT: NORMAL
PATH REPORT.FINAL DX SPEC: NORMAL
PATH REPORT.GROSS SPEC: NORMAL

## 2021-11-29 ENCOUNTER — CLINICAL SUPPORT (OUTPATIENT)
Dept: INTERNAL MEDICINE | Age: 71
End: 2021-11-29

## 2021-11-29 PROCEDURE — 96372 THER/PROPH/DIAG INJ SC/IM: CPT | Performed by: INTERNAL MEDICINE

## 2021-11-29 RX ADMIN — CYANOCOBALAMIN 1000 MCG: 1000 INJECTION, SOLUTION INTRAMUSCULAR; SUBCUTANEOUS at 09:35

## 2021-11-30 ENCOUNTER — TELEPHONE (OUTPATIENT)
Dept: SURGERY | Facility: CLINIC | Age: 71
End: 2021-11-30

## 2021-11-30 NOTE — TELEPHONE ENCOUNTER
----- Message from Jose Christine III, MD sent at 11/30/2021 12:27 PM EST -----  Regarding: RE: NEXT EGD  Will reevaluate on his next office visit as to whether he needs another EGD or not  ----- Message -----  From: Kamila Morgan  Sent: 11/30/2021   9:32 AM EST  To: Jose Christine III, MD  Subject: NEXT EGD                                         Jose Hurtado had his EGD on 11/17.  He is due to see [facility] in March with a Ct chest.  Does he need another EGD planned before that time?

## 2021-12-13 NOTE — TELEPHONE ENCOUNTER
Jose called to let Dr. Phelan know that he's currently in the hospital with low hemoglobin.     Ph# [phone]   Valtrex Counseling: I discussed with the patient the risks of valacyclovir including but not limited to kidney damage, nausea, vomiting and severe allergy.  The patient understands that if the infection seems to be worsening or is not improving, they are to call.

## 2021-12-27 ENCOUNTER — HOSPITAL ENCOUNTER (OUTPATIENT)
Dept: PHYSICAL THERAPY | Facility: HOSPITAL | Age: 71
Setting detail: THERAPIES SERIES
Discharge: HOME | End: 2021-12-27

## 2021-12-27 DIAGNOSIS — I89.0 LYMPHEDEMA: Primary | ICD-10-CM

## 2021-12-27 PROCEDURE — 97140 MANUAL THERAPY 1/> REGIONS: CPT

## 2021-12-27 NOTE — THERAPY PROGRESS REPORT/RE-CERT
"    Outpatient Physical Therapy Lymphedema Progress Note  [facility]     Patient Name: Jose Hurtado  : 1950  MRN: [id number]  Today's Date: 2021        Visit Date: 2021    Visit Dx:    ICD-10-CM ICD-9-CM   1. Lymphedema  I89.0 457.1       Patient Active Problem List   Diagnosis   • Adenocarcinoma of esophagus (HCC)   • Adenomatous polyp of colon   • Malignant neoplasm of prostate (HCC)   • RLS (restless legs syndrome)   • History of DVT (deep vein thrombosis)   • Recurrent major depressive disorder, in partial remission (HCC)   • Hypertension   • Anemia   • Insomnia due to other mental disorder   • Peripheral polyneuropathy   • B12 deficiency   • Postsurgical malabsorption   • Lymphedema   • Iron deficiency   • Gastroparesis   • Acute deep vein thrombosis (DVT) of popliteal vein of left lower extremity (HCC)   • Tremor of both hands   • Gastrointestinal hemorrhage   • Acute blood loss anemia   • Pancytopenia (HCC)   • Chronic venous hypertension due to DVT   • COPD (chronic obstructive pulmonary disease) (HCC)   • Bleeding hemorrhoid   • Malabsorption of iron   • Iron deficiency anemia   • Pleuritic chest pain   • History of esophageal cancer   • Abnormal CT scan   • Generalized weakness   • Chronic anticoagulation   • Urinary tract infection due to extended-spectrum beta lactamase (ESBL) producing Escherichia coli   • CKD (chronic kidney disease) stage 2, GFR 60-89 ml/min   • Dysphagia         Lymphedema     Row Name 21 0800             Subjective Pain    Able to rate subjective pain? yes  -KA      Pre-Treatment Pain Level 0  -KA      Post-Treatment Pain Level 0  -KA              Subjective Comments    Subjective Comments Pt reports that he has been having numbness in BLE, but he isn't sure if that's related to lymphedema or if that's from something else.  Reports \"I've been using compression garments for several years and have been treated twice at this clinic.  I may need another " "type of compression.  The stockings aren't doing as well as they originally did.  -KA              Lymphedema Assessment    Lymphedema Classification RLE:; LLE:; stage 2 (Spontaneously Irreversible)  -KA      Lymphedema Surgery Comments Prostatectomy 2010, esophagectomy 4/2015  -KA      Chemo Received yes  -KA      Infections or Cellulitis? no  -KA              Physical Concerns    The amount of pain associated with my lymphedema is: 1  -KA      The amount of limb heaviness associated with my lymphedema is: 2  -KA      The amount of skin tightness associated with my lymphedema is: 3  -KA      The size of my swollen limb(s) seems: 3  -KA      Lymphedema affects the movement of my swollen limb(s): 2  -KA      The strength in my swollen limb(s) is: 3  -KA              Psychosocial Concerns    Lymphedema affects my body image (i.e., \"how I think I look\"). 2  -KA      Lymphedema affects my socializing with others. 1  -KA      Lymphedema affects my intimate relations with spouse or partner (rate 0 if not applicable 0  -KA      Lymphedema \"gets me down\" (i.e., depression, frustration, or anger) 0  -KA      I must rely on others for help due to my lymphedema. 0  -KA      I know what to do to manage my lymphedema 0  -KA              Functional Concerns    Lymphedema affects my ability to perform self-care activities (i.e. eating, dressing, hygiene) 0  -KA      Lymphedema affects my ability to perform routine home or work-related activities. 1  -KA      Lymphedema affects my performance of preferred leisure activities. 1  -KA      Lymphedema affects proper fit of clothing/shoes 2  -KA      Lymphedema affects my sleep 1  -KA              Posture/Observations    Posture/Observations Comments Pt ambulates independently, severe genu valgus B, decreased gait speed, decreased balance  -KA              General ROM    GENERAL ROM COMMENTS BLE grossly WFLs, able to reach feet to don socks and shoes with great difficulty  -KA           "    MMT (Manual Muscle Testing)    General MMT Comments 4/5 BLE  -KA              Lymphedema Edema Assessment    Ptting Edema Category By grade out of 4  -KA      Pitting Edema + 3/4  -KA      Stemmer Sign positive; bilateral:  -KA      Edema Assessment Comment Mod pitting edema BLE, worse on LLE  -KA              Skin Changes/Observations    Skin Observations Comment Min skin dryness noted, lower legs are shiny, normal coloration  -KA              Lymphedema Sensation    Lymphedema Sensation Comments Reports BLE numbness to knees, denies tingling  -KA              Lymphedema Measurements    Measurement Type(s) Circumferential  -KA      Circumferential Areas Lower extremities  -KA              BLE Circumferential (cm)    Measurement Location 1 Knee  -KA      Left 1 41.8 cm  -KA      Right 1 39 cm  -KA      Measurement Location 2 10cm below knee  -KA      Left 2 46.9 cm  -KA      Right 2 42.5 cm  -KA      Measurement Location 3 20cm below knee  -KA      Left 3 48.8 cm  -KA      Right 3 44.2 cm  -KA      Measurement Location 4 30cm below knee  -KA      Left 4 40.3 cm  -KA      Right 4 37.8 cm  -KA      Measurement Location 5 Ankle  -KA      Left 5 34 cm  -KA      Right 5 31.5 cm  -KA      Measurement Location 6 Midfoot  -KA      Left 6 26.9 cm  -KA      Right 6 26.5 cm  -KA      LLE Circumferential Total 238.7 cm  -KA      RLE Circumferential Total 221.5 cm  -KA              Compression/Skin Care    Compression/Skin Care skin care; wrapping location; bandaging  -KA      Skin Care washed/dried; moisturizing lotion applied  -KA      Wrapping Location lower extremity  -KA      Wrapping Location LE bilateral:; foot to knee  -KA      Bandage Layers cotton liner; padding/fluff layer; short-stretch bandages (comment size/quantity)  -KA      Bandaging Comments TG9, Artiflex x 3, Rosidal K 1-8 cm, 3-10 cm to each leg  -KA      Bandaging Technique circumferential/spiral; light compression  -KA              Lymphedema Life Impact  Scale Totals    A.  Total Q1 - Q17 (Do not include Q18) 22  -KA      B.  Total number of questions answered (Q1-Q17) 17  -KA      C. Divide A by B 1.29  -KA      D. Multiple C by 25 32.25  -KA            User Key  (r) = Recorded By, (t) = Taken By, (c) = Cosigned By    Initials Name Provider Type    Rubina Amaya, PT Physical Therapist                               PT Assessment/Plan     Row Name 12/27/21 0918          PT Assessment    Functional Limitations Impaired gait; Limitation in home management; Limitations in community activities; Performance in leisure activities; Performance in self-care ADL  -KA     Impairments Edema; Gait; Impaired lymphatic circulation  -KA     Assessment Comments Pt returned to lymphedema clinic for first treatment since initial evaluation.  Girth measurements reassessed and found to be increased bilaterally, RLE =221.5, LYM=868.7, skin conditions still good.  No change in Lymphedema Life Impact Scale.  Pt was instructed in bandaging technique for BLE to level of knee, good initial response to compression bandaging.  Instructed to leave bandaging intact until next visit.  -KA     Rehab Potential Good  -KA     Patient/caregiver participated in establishment of treatment plan and goals Yes  -KA     Patient would benefit from skilled therapy intervention Yes  -KA            PT Plan    PT Frequency 5x/week  -KA     Predicted Duration of Therapy Intervention (PT) 4 weeks, then 1-2 additional visits to fit/establish long term commpression  -KA     Planned CPT's? PT EVAL MOD COMPLELITY: 44338; PT RE-EVAL: 40793; PT THER PROC EA 15 MIN: 63188; PT THER ACT EA 15 MIN: 07777; PT MANUAL THERAPY EA 15 MIN: 61626; PT NEUROMUSC RE-EDUCATION EA 15 MIN: 43127; PT GAIT TRAINING EA 15 MIN: 07278; PT SELF CARE/HOME MGMT/TRAIN EA 15: 71511  -     Physical Therapy Interventions (Optional Details) bandaging; home exercise program; manual lymphatic drainage; patient/family education; other (see  "comments)  Skin care  -KA     PT Plan Comments Assess response to compression bandaging.  Instruct patient in how to remove bandaging, assess ability.  If able, have him begin removing bandaging just prior to treatment sessions.  Begin MLD if time permits.  -KA           User Key  (r) = Recorded By, (t) = Taken By, (c) = Cosigned By    Initials Name Provider Type    Rubina Amaya, PT Physical Therapist                    OP Exercises     Row Name 12/27/21 0923 12/27/21 0800          Subjective Comments    Subjective Comments -- Pt reports that he has been having numbness in BLE, but he isn't sure if that's related to lymphedema or if that's from something else.  Reports \"I've been using compression garments for several years and have been treated twice at this clinic.  I may need another type of compression.  The stockings aren't doing as well as they originally did.  -KA            Subjective Pain    Able to rate subjective pain? -- yes  -KA     Pre-Treatment Pain Level -- 0  -KA     Post-Treatment Pain Level -- 0  -KA            Total Minutes    66567 - PT Manual Therapy Minutes 57  -KA --           User Key  (r) = Recorded By, (t) = Taken By, (c) = Cosigned By    Initials Name Provider Type    Rubina Amaya, PT Physical Therapist                        Manual Rx (last 36 hours)     Manual Treatments     Row Name 12/27/21 0923             Total Minutes    61772 - PT Manual Therapy Minutes 57  -KA            User Key  (r) = Recorded By, (t) = Taken By, (c) = Cosigned By    Initials Name Provider Type    Rubina Amaya, PT Physical Therapist                 PT OP Goals     Row Name 12/27/21 0900          PT Short Term Goals    STG Date to Achieve 11/18/21  -KA     STG 1 Pt demo awareness of condition and precautions for improved prevention, management, care of symptoms, and ease of transition to self-care of condition.  -KA     STG 1 Progress New  -KA     STG 2 Pt demo decreased net edema of " >/=5-10cm for decreased edema symptoms, decreased risk of infection, and improved skin care.  -     STG 2 Progress New  -     STG 3 Patient independent and compliant with home exercise program (as able) focused on range of motion and flexibility to improve lymphatic flow and discomfort.  -     STG 3 Progress New  -            Long Term Goals    LTG Date to Achieve 03/22/21  -     LTG 1 Pt/family independent with self care techniques for self-management of condition.  -     LTG 1 Progress New  -     LTG 2 Pt demo decreased net edema of >/=10-20cm for decreased edema symptoms, decreased risk of infection, and improved skin care.  -     LTG 2 Progress New  -     LTG 3 Pt/family independent with compression garments as indicated for self-management of condition.  -     LTG 3 Progress New  -            Time Calculation    PT Goal Re-Cert Due Date 01/17/22  -           User Key  (r) = Recorded By, (t) = Taken By, (c) = Cosigned By    Initials Name Provider Type    Rubina Amaya, PT Physical Therapist                Therapy Education  Education Details: Discussed POC and bandaging technique as well as girth measurements, instructed to leave bandaging intact until next visit where therapist will instruct him in how to remove.  Given: Bandaging/dressing change  Program: New  How Provided: Verbal  Provided to: Patient  Level of Understanding: Verbalized              Time Calculation:   Start Time: 0815  Stop Time: 0912  Time Calculation (min): 57 min  Total Timed Code Minutes- PT: 57 minute(s)  Timed Charges  91941 - PT Manual Therapy Minutes: 57  Total Minutes  Timed Charges Total Minutes: 57   Total Minutes: 57   Therapy Charges for Today     Code Description Service Date Service Provider Modifiers Qty    [id number] HC PT MANUAL THERAPY EA 15 MIN 12/27/2021 Rubina Powers, PT GP, KX 4                    Rubina Powers, PT  12/27/2021

## 2021-12-27 NOTE — PROGRESS NOTES
Subjective .     REASONS FOR FOLLOWUP:  Esophageal cancer, cytopenias     HISTORY OF PRESENT ILLNESS:  The patient is a 71 y.o. year old male  who is here for follow-up with the above-mentioned history.    Swallowing much better since the EGD with dilation.  Currently having yellow rhinorrhea and nasal congestion and cough and sinus pressure.  Has an appoint with his internist tomorrow.    No complaints of pain or bleeding    Past Medical History:   Diagnosis Date   • Acute deep vein thrombosis (DVT) of popliteal vein of left lower extremity (HCC) 03/24/2020   • Anemia    • Anti-phospholipid syndrome (HCC)     On lifelong anticoagulation therapy   • Bladder disorder     LEAKAGE   ON MED  wers pads   • Bleeding hemorrhoids    • Community acquired pneumonia     HISTORY OF IN 2014   • Deep venous thrombosis (HCC) 2006, 2008    Left lower extremity multiple   • Depression    • Esophageal carcinoma (HCC) 12/31/2014    had chemo and radiation prior surgery   • Hemorrhoids    • HH (hiatus hernia)    • History of atrial fibrillation 2015    ONE EPISODE WHILE HOSPITALIZED   • History of kidney stones    • History of nephrolithiasis    • History of pancreatitis     PT STATES MANY YEARS AGO   • History of radiation therapy    • History of transfusion    • Hypertension    • Long-term (current) use of anticoagulants, INR goal 2.0-3.0    • Lymphedema    • Malignant neoplasm of prostate (HCC)    • Other hyperlipidemia 1/30/2018 January 30, 2018 lipid panel risk 12.8%   • Restless legs syndrome    • Sleep apnea     OCCASIONALLY WEARS CPAP   • Squamous carcinoma     on the head     Past Surgical History:   Procedure Laterality Date   • APPENDECTOMY  1950   • BRONCHOSCOPY      (Diagnostic)   • CATARACT EXTRACTION Bilateral 2014   • COLONOSCOPY  12/15/2014    Complete / Description: EH, IH, torts, stool, follow-up colonoscopy due in 5 years.   • COLONOSCOPY N/A 6/13/2017    non-thrombosed external hemorrhoids, normal examined  ileum, IH   • COLONOSCOPY N/A 2/26/2019    Procedure: COLONOSCOPY with Cold Polypectomy;  Surgeon: Mulugeta Millan MD;  [facility];  Service: Gastroenterology   • COLONOSCOPY N/A 12/16/2020    Procedure: COLONOSCOPY to cecum into TI;  Surgeon: Mesha Sanchez MD;  [facility];  Service: Gastroenterology;  Laterality: N/A;  pre: lower GI bleed  post: hemorrhoids    • CYSTOSCOPY W/ LASER LITHOTRIPSY     • ENDOSCOPY N/A 6/13/2017    Procedure: ESOPHAGOGASTRODUODENOSCOPY WITH COLD BIOPSY;  Surgeon: Lake Gonzalez MD;  Location: [facility] ENDOSCOPY;  Service:    • ENDOSCOPY N/A 11/18/2021    Procedure: ESOPHAGOGASTRODUODENOSCOPY WITH  DILATATION WITH FLUOROSCOPY;  Surgeon: Jose Christine III, MD;  Location: [facility] ENDOSCOPY;  Service: Gastroenterology;  Laterality: N/A;  Pre: dysphagia, h/x of adinocarcinoma of esophagus  Post: same   • ESOPHAGECTOMY      April 2015, stage IIB esophageal carcinoma, sub-total resection.   • ESOPHAGECTOMY      Esophagectomy Subtotal [place] Procedure   • EXCISION LESION  08/2012    Removal of Squamous Cell CA on Head   • HAMMER TOE REPAIR  09/2014    Hammertoe Operation (Each Toe), 10/2014   • HAMMER TOE REPAIR Left 10/3/2017    Procedure: Left second third and fourth distal interphalangeal joint resection with flexor tenotomy;  Surgeon: Mulugeta Lira MD;  Location: [facility] OR OSC;  Service:    • HEMORRHOIDECTOMY N/A 12/23/2020    Procedure: HEMORRHOIDECTOMY;  Surgeon: Billy Julio Jr., MD;  Location: [facility] MAIN OR;  Service: General;  Laterality: N/A;   • HERNIA REPAIR      incisional   • JEJUNOSTOMY      Laparoscopic   • JEJUNOSTOMY      tube removal    • KNEE SURGERY Bilateral 1967, 1973, 1981   • PATELLA SURGERY Left     removed   • PILONIDAL CYST / SINUS EXCISION     • MD TOTAL KNEE ARTHROPLASTY Right 3/26/2018    Procedure: TOTAL KNEE ARTHROPLASTY;  Surgeon: Renny Solis MD;  Location: [facility] MAIN OR;  Service: Orthopedics    • PROSTATECTOMY  2010   • PYLOROPLASTY     • SPINAL FUSION  02/1998    C 5,6   • TONSILLECTOMY     • UPPER GASTROINTESTINAL ENDOSCOPY  12/15/2014    LA Grade D esophagitis, Pardo's, HH, multiple duodenal ulcers   • VENTRAL/INCISIONAL HERNIA REPAIR N/A 4/14/2016    Procedure: VENTRAL/INCISIONAL HERNIA REPAIR, open, with mesh, and component separation;  Surgeon: Darren Rivas MD;  Location: [facility];  Service:        HEMATOLOGIC/ONCOLOGIC HISTORY:  (History from previous dates can be found in the separate document.)    MEDICATIONS    Current Outpatient Medications:   •  albuterol sulfate  (90 Base) MCG/ACT inhaler, Inhale 1 puff Every 4 (Four) Hours As Needed for Wheezing., Disp: , Rfl:   •  Eliquis 5 MG tablet tablet, TAKE ONE TABLET BY MOUTH EVERY 12 HOURS, Disp: 180 tablet, Rfl: 1  •  furosemide (LASIX) 20 MG tablet, TAKE ONE TABLET BY MOUTH DAILY, Disp: 30 tablet, Rfl: 2  •  ketoconazole (NIZORAL) 2 % cream, , Disp: , Rfl:   •  metoprolol tartrate (LOPRESSOR) 25 MG tablet, TAKE ONE TABLET BY MOUTH TWICE A DAY, Disp: 180 tablet, Rfl: 3  •  pantoprazole (PROTONIX) 40 MG EC tablet, TAKE ONE TABLET BY MOUTH TWICE A DAY BEFORE A MEAL, Disp: 180 tablet, Rfl: 3  •  PARoxetine (PAXIL) 40 MG tablet, TAKE ONE TABLET BY MOUTH EVERY MORNING, Disp: 30 tablet, Rfl: 5  •  potassium chloride (MICRO-K) 10 MEQ CR capsule, TAKE ONE CAPSULE BY MOUTH DAILY, Disp: 30 capsule, Rfl: 5  •  potassium chloride (MICRO-K) 10 MEQ CR capsule, , Disp: , Rfl:   •  pramipexole (MIRAPEX) 1.5 MG tablet, TAKE ONE TABLET BY MOUTH TWICE A DAY (Patient taking differently: 3 mg Every Night. 2 tabs of 1.5 mg), Disp: 180 tablet, Rfl: 1    Current Facility-Administered Medications:   •  cyanocobalamin injection 1,000 mcg, 1,000 mcg, Intramuscular, Q28 Days, Brandin Bolton MD, 1,000 mcg at 11/29/21 0935    ALLERGIES:   No Known Allergies    SOCIAL HISTORY:       Social History     Socioeconomic History   • Marital status:      " Spouse name: Lena   • Number of children: 0   • Years of education: College   Tobacco Use   • Smoking status: Former Smoker     Packs/day: 2.00     Years: 3.00     Pack years: 6.00     Types: Cigarettes     Quit date:      Years since quittin.0   • Smokeless tobacco: Former User     Types: Chew   • Tobacco comment: no caffeine    Vaping Use   • Vaping Use: Never used   Substance and Sexual Activity   • Alcohol use: Yes     Alcohol/week: 3.0 standard drinks     Types: 1 Glasses of wine, 1 Cans of beer, 1 Shots of liquor per week     Comment: 1-2 drinks/week   • Drug use: Not Currently     Types: Marijuana     Comment: hx marijuana use \"a long time ago\"   • Sexual activity: Defer         FAMILY HISTORY:  Family History   Problem Relation Age of Onset   • Cerebral aneurysm Mother         cerebral artery aneurysm ( age 56)   • Prostate cancer Brother 68   • Anxiety disorder Father    • Suicide Attempts Father          of suicide   • Cancer Father         bladder   • No Known Problems Brother    • Pancreatic cancer Nephew    • Colon cancer Neg Hx    • Esophageal cancer Neg Hx    • Dementia Neg Hx    • Malig Hyperthermia Neg Hx        REVIEW OF SYSTEMS:    Review of Systems   Constitutional: Negative for activity change.   HENT: Negative for nosebleeds and trouble swallowing.    Respiratory: Negative for shortness of breath and wheezing.    Cardiovascular: Negative for chest pain and palpitations.   Gastrointestinal: Negative for constipation and diarrhea.   Genitourinary: Negative for dysuria and hematuria.   Musculoskeletal: Negative for arthralgias and myalgias.   Neurological: Negative for seizures and syncope.   Hematological: Negative for adenopathy. Does not bruise/bleed easily.   Psychiatric/Behavioral: Negative for confusion.           Objective    Vitals:    21 1109   BP: 146/75   Pulse: 77   Resp: 18   Temp: 97.7 °F (36.5 °C)   TempSrc: Temporal   SpO2: 95%   Weight: 99.2 kg (218 lb " "11.2 oz)   Height: 195.6 cm (77.01\")   PainSc: 0-No pain     Current Status 12/28/2021   ECOG score 1      PHYSICAL EXAM:          CONSTITUTIONAL:  Vital signs reviewed.  No distress, looks comfortable.  EYES:  Conjunctiva and lids unremarkable.  PERRLA  EARS,NOSE,MOUTH,THROAT:  Ears and nose appear unremarkable.  Lips, teeth, gums appear unremarkable.  RESPIRATORY:  Normal respiratory effort.  Lungs clear to auscultation bilaterally.  CARDIOVASCULAR:  Normal S1, S2.  No murmurs rubs or gallops.  No change significant bilateral leg edema.  States he is going to the lymphedema clinic.  GASTROINTESTINAL: Abdomen appears unremarkable.  Nontender.  No hepatomegaly.  No splenomegaly.  LYMPHATIC:  No cervical, supraclavicular, axillary lymphadenopathy.  SKIN:  Warm.  No rashes.  PSYCHIATRIC:  Normal judgment and insight.  Normal mood and affect.            RECENT LABS:        WBC   Date/Time Value Ref Range Status   12/28/2021 10:58 AM 5.32 3.40 - 10.80 10*3/mm3 Final   04/15/2019 12:31 PM 3.53 3.40 - 10.80 10*3/mm3 Final   04/16/2018 04:25 PM 4.23 (L) 4.5 - 11.0 10*3/uL Final     Hemoglobin   Date/Time Value Ref Range Status   12/28/2021 10:58 AM 10.4 (L) 13.0 - 17.7 g/dL Final   04/16/2018 04:25 PM 9.9 (L) 13.5 - 17.5 g/dL Final     Platelets   Date/Time Value Ref Range Status   12/28/2021 10:58  140 - 450 10*3/mm3 Final   04/16/2018 04:25  140 - 440 10*3/uL Final       Assessment/Plan     ASSESSMENT:  Adenocarcinoma of esophagus (HCC)  - Ferritin  - Iron Profile  - Retic With IRF & RET-He  - CBC & Differential      *Esophageal adenocarcinoma. Initially T2N0M0. After neoadjuvant carboplatin/Taxol with radiation, achieved a pathologic CR at resection, 4/24/2015.   · As per NCCN guidelines, plan CAT scans every 6 months x1 year, then every 6 to 9 months on years 2 and years 3. Defer any surveillance EGDs to Dr. Gonzalez if he feels they are appropriate.   · Also as per NCCN guidelines, plan H and P every 3 to 6 " months on years 1 and years 2, then every 6 to 12 months' time on years 3 through years 5 and then annually.   · EGD 6/13/17 by Dr. Gonzalez: No evidence of recurrence.  · CT scan 3/2/18 (this completed 3 years of surveillance CT): No evidence of recurrence.  Plan no more surveillance CT.  · EGD 11/18/2021, Dr. Christine: No evidence of recurrent cancer.  Dilated.  No evidence of recurrence.  Remains in remission.    *Recurrent DVT, prior to cancer diagnosis.    · Dr. Bolton previously managed his Coumadin.   · Chronic left leg larger than right, since DVT  · Acute left popliteal, gastrocnemius DVT on 3/24/2020 despite INR 3.4.  Therefore, changed to Eliquis.  · On 3/25/2020, unremarkable: Lupus anticoagulant, beta-2 glycoprotein antibodies.  Anticardiolipin antibodies also felt to be unremarkable with IgG and IgM both at 15 (negative is <15.  Indeterminate is 15-20).  No evidence of recurrent DVT.    *CT angiogram chest 3/24/2020 (LLE acute DVT found 3/24/2020): Mild increased opacity LLL may represent developing infiltrate versus atelectasis.  Radiologist recommended follow-up.  · CT chest 5/1/2020: Resolution of groundglass LLL infiltrate from the 3/24/2020 CT.  A few mildly enlarged mediastinal nodes are less prominent than on the 3/18/2020 CT.  No new abnormalities.  Plan no more follow-up CT scans.    *Persistent cough.  He has seen Dr. Maher Sayied for this.    He did not complain of this today.    *Previously complained of emesis occurring 5 hours after eating on average once every month or 2.  No nausea otherwise.  Denies dysphagia or odynophagia.    Unchanged.  Occurring on average once every couple of months.  He did not complain of this today    *Iron deficiency anemia  · Hb mostly around 11  · On 4/15/2019, ferritin 29.7, 13% saturation.  Serum folate normal.  · On oral iron daily through PCP.  · On 8/23/19, ferritin 65, 14%.   · Increased oral iron.   · On 10/15/19, ferritin 72, 10%.  · On 10/25/2019, stopped  oral iron since it was not helping.    · Oral iron not effective due to poor absorption  · 2 doses Injectafer.  · On 12/13/2019, ferritin 708, 15% saturation, Hb 10.4.  · Therefore, no IV iron.  Monitor.  · On 5/5/2020, ferritin 346, 15% saturation, Hb 9.9.  Therefore, no need for IV iron at this time.  · On 7/7/2020, Hb up to 11.7.  Iron labs normal.  · Admission 12/15/2020: Hb 6.6.  Ferritin 40, iron saturation 17%.  Discharged on 12/21/2020 with Hb 7.1, which fell from 7.9 on 12/18/2020, from 8.9 in the early morning 12/18/2020.  The drop in Hb was thought to be due to hemorrhoidal bleeding related to Eliquis.  Eliquis was stopped.  Hemorrhoidal repair planned by Dr. Flynn Julio after the holidays.  Was given Venofer 300 mg on 12/17/2020 by Dr. Ross of our practice  · On 12/29/2020, ferritin 176, 13% saturation, Hb 8.4, reticulate hemoglobin low at 25.7.  Suspect he would benefit from some more iron.  Given 1 dose Injectafer.  · On 2/2/2021, ferritin 317, 17% saturation, Hb 10.3.  Therefore, Hb gradually improved since the 1 dose of Injectafer.  · 3/2/2021: Ferritin iron 93, 11% saturation, Hb 11.9.  1 dose Injectafer.  · 3/23/2021: Ferritin 471, 20% saturation, Hb 10.8  · 7/6/2021: Ferritin 329, 21% saturation, Hb 10.8  · 9/28/2021: Ferritin 230, 20% saturation, Hb 11.4.  No need for IV iron.  · 12/28/2021: Ferritin 337, 6% iron saturation, Hb 10.4.  No IV iron given.  (Although saturation is low, ferritin is 337).    *Hemorrhoidal bleeding with Hb down to 6.6, requiring admission, 12/15/2020.  Thought to be related to Eliquis.  · Eliquis was stopped.  · Hemorrhoidal repair by Dr. Flynn Julio, 12/23/2020  · Eliquis subsequently restarted with no more issues with bleeding.  · 1 episode of dark stools yesterday, 9/27/2021.  Told him if he notices more of this he should contact Dr. Sanchez.    *9/28/2021: Change in stool frequency.  · Was having a bowel movement 3 times per week.  For the past week has been having  3 formed bowel movements per day.  I told him if this persists he should discuss with Dr. Sanchez.    *Source of iron deficiency.  · Last colonoscopy 2/26/2019 by Dr. Millan.  Last EGD 6/13/2017 by Dr. Gonzalez.  · Suspect he has an absorption issue due to previous esophageal surgery.    *B12 deficiency.  · On 6/6/2019, B12 <150  · On B12 injections through PCP.  · B12 on 5/5/2020 normal at 694  · B12 on 2/2/2021, 789    *Anemia for reasons in addition to iron deficiency and B12 deficiency  · Baseline Hb mostly 10.5-11.5.  · On 5/5/2020, unremarkable: RBC folate, iron labs, B12, haptoglobin, TB, LDH, direct Maki.  · Creatinine baseline is 0.9-1.2   · Hb 9.9 on 5/5/2020  · On 7/7/2020, Hb up to 11.7.  Return to prior follow-up plan.  · On 2/2/2021, B12 and RBC folate unremarkable  · 3/23/2021: Hb 10.8 despite normal iron stores.  · 5/25/2021, Hb 10.7 with normal iron labs  · 7/6/2021, Hb 10.8 with normal iron labs  · 12/28/2021: Hb 10.4    *Leukocytopenia  · New issue on 3/23/2021, WBC 3.38, based on recent labs, but WBC has been as low as 2.9 December 2019  · WBC 5.3    *Neutropenia  · ANC 1590 on 3/23/2021 (previously ANC has been as low as 1480 with WBC in the low normal range)  · ANC 3500    *Thrombocytopenia  · New issue on 3/23/2021, , based on recent labs, but PLT has been as low as 119 October 2019  ·     *He had a white blood cell count in the upper 3s and a hemoglobin in the upper 12s with a normal platelet count prior to beginning chemotherapy.     *12/28/2021: Sinus pressure, yellow rhinorrhea, nasal congestion, cough.  He sees his internist tomorrow.  In the meantime, recommended Afrin and a steroid nose spray.  I told him to not take the Afrin more than 3 days consecutively.    PLAN:  · MD CBC stat iron labs 3 months  · Baseline Hb when not in the hospital mostly 9.5-11.5  · Continue Eliquis  · On 7/6/2021, he stated he had been forgetting his morning dose.  He insists he will do better  about taking twice per day dosing    · hemorrhoids have been repaired.  Last drop of Hb was down to 7.1 on 12/21/2020.  (Hemorrhoidal bleeding)  · If more dark stools, contact Dr. Sanchez.  If more significant changes in bowel movements, contact Dr. Sanchez.    Prior plan was:  · MD every 6-month.  · No more surveillance CT scans.  · He does not have a port.    I have discussed with him if Hb drops and remains around 9 or so, we may want to plan a bone marrow biopsy

## 2021-12-28 ENCOUNTER — OFFICE VISIT (OUTPATIENT)
Dept: ONCOLOGY | Facility: CLINIC | Age: 71
End: 2021-12-28

## 2021-12-28 ENCOUNTER — HOSPITAL ENCOUNTER (OUTPATIENT)
Dept: PHYSICAL THERAPY | Facility: HOSPITAL | Age: 71
Setting detail: THERAPIES SERIES
Discharge: HOME | End: 2021-12-28

## 2021-12-28 ENCOUNTER — LAB (OUTPATIENT)
Dept: OTHER | Facility: HOSPITAL | Age: 71
End: 2021-12-28

## 2021-12-28 VITALS
RESPIRATION RATE: 18 BRPM | BODY MASS INDEX: 25.82 KG/M2 | SYSTOLIC BLOOD PRESSURE: 146 MMHG | DIASTOLIC BLOOD PRESSURE: 75 MMHG | TEMPERATURE: 97.7 F | OXYGEN SATURATION: 95 % | WEIGHT: 218.7 LBS | HEART RATE: 77 BPM | HEIGHT: 77 IN

## 2021-12-28 DIAGNOSIS — I89.0 LYMPHEDEMA: Primary | ICD-10-CM

## 2021-12-28 DIAGNOSIS — C15.9 ADENOCARCINOMA OF ESOPHAGUS (HCC): Primary | ICD-10-CM

## 2021-12-28 DIAGNOSIS — D64.9 ANEMIA, UNSPECIFIED TYPE: ICD-10-CM

## 2021-12-28 LAB
BASOPHILS # BLD AUTO: 0.03 10*3/MM3 (ref 0–0.2)
BASOPHILS NFR BLD AUTO: 0.6 % (ref 0–1.5)
DEPRECATED RDW RBC AUTO: 48.6 FL (ref 37–54)
EOSINOPHIL # BLD AUTO: 0.11 10*3/MM3 (ref 0–0.4)
EOSINOPHIL NFR BLD AUTO: 2.1 % (ref 0.3–6.2)
ERYTHROCYTE [DISTWIDTH] IN BLOOD BY AUTOMATED COUNT: 14.2 % (ref 12.3–15.4)
FERRITIN SERPL-MCNC: 336.8 NG/ML (ref 30–400)
HCT VFR BLD AUTO: 32.7 % (ref 37.5–51)
HGB BLD-MCNC: 10.4 G/DL (ref 13–17.7)
HGB RETIC QN AUTO: 33.4 PG (ref 29.8–36.1)
IMM GRANULOCYTES # BLD AUTO: 0.01 10*3/MM3 (ref 0–0.05)
IMM GRANULOCYTES NFR BLD AUTO: 0.2 % (ref 0–0.5)
IMM RETICS NFR: 10.9 % (ref 3–15.8)
IRON 24H UR-MRATE: 19 MCG/DL (ref 59–158)
IRON SATN MFR SERPL: 6 % (ref 20–50)
LYMPHOCYTES # BLD AUTO: 1.11 10*3/MM3 (ref 0.7–3.1)
LYMPHOCYTES NFR BLD AUTO: 20.9 % (ref 19.6–45.3)
MCH RBC QN AUTO: 29.9 PG (ref 26.6–33)
MCHC RBC AUTO-ENTMCNC: 31.8 G/DL (ref 31.5–35.7)
MCV RBC AUTO: 94 FL (ref 79–97)
MONOCYTES # BLD AUTO: 0.56 10*3/MM3 (ref 0.1–0.9)
MONOCYTES NFR BLD AUTO: 10.5 % (ref 5–12)
NEUTROPHILS NFR BLD AUTO: 3.5 10*3/MM3 (ref 1.7–7)
NEUTROPHILS NFR BLD AUTO: 65.7 % (ref 42.7–76)
NRBC BLD AUTO-RTO: 0 /100 WBC (ref 0–0.2)
PLATELET # BLD AUTO: 148 10*3/MM3 (ref 140–450)
PMV BLD AUTO: 9.4 FL (ref 6–12)
RBC # BLD AUTO: 3.48 10*6/MM3 (ref 4.14–5.8)
RETICS # AUTO: 0.03 10*6/MM3 (ref 0.02–0.13)
RETICS/RBC NFR AUTO: 0.91 % (ref 0.7–1.9)
TIBC SERPL-MCNC: 305 MCG/DL (ref 298–536)
TRANSFERRIN SERPL-MCNC: 205 MG/DL (ref 200–360)
WBC NRBC COR # BLD: 5.32 10*3/MM3 (ref 3.4–10.8)

## 2021-12-28 PROCEDURE — 84466 ASSAY OF TRANSFERRIN: CPT | Performed by: INTERNAL MEDICINE

## 2021-12-28 PROCEDURE — 36415 COLL VENOUS BLD VENIPUNCTURE: CPT

## 2021-12-28 PROCEDURE — 85025 COMPLETE CBC W/AUTO DIFF WBC: CPT | Performed by: INTERNAL MEDICINE

## 2021-12-28 PROCEDURE — 85046 RETICYTE/HGB CONCENTRATE: CPT | Performed by: INTERNAL MEDICINE

## 2021-12-28 PROCEDURE — 97140 MANUAL THERAPY 1/> REGIONS: CPT

## 2021-12-28 PROCEDURE — 82728 ASSAY OF FERRITIN: CPT | Performed by: INTERNAL MEDICINE

## 2021-12-28 PROCEDURE — 83540 ASSAY OF IRON: CPT | Performed by: INTERNAL MEDICINE

## 2021-12-28 PROCEDURE — 99214 OFFICE O/P EST MOD 30 MIN: CPT | Performed by: INTERNAL MEDICINE

## 2021-12-28 NOTE — THERAPY TREATMENT NOTE
Outpatient Physical Therapy Lymphedema Treatment Note  [facility]     Patient Name: Jose Hurtado  : 1950  MRN: [id number]  Today's Date: 2021        Visit Date: 2021    Visit Dx:    ICD-10-CM ICD-9-CM   1. Lymphedema  I89.0 457.1       Patient Active Problem List   Diagnosis   • Adenocarcinoma of esophagus (HCC)   • Adenomatous polyp of colon   • Malignant neoplasm of prostate (HCC)   • RLS (restless legs syndrome)   • History of DVT (deep vein thrombosis)   • Recurrent major depressive disorder, in partial remission (HCC)   • Hypertension   • Anemia   • Insomnia due to other mental disorder   • Peripheral polyneuropathy   • B12 deficiency   • Postsurgical malabsorption   • Lymphedema   • Iron deficiency   • Gastroparesis   • Acute deep vein thrombosis (DVT) of popliteal vein of left lower extremity (HCC)   • Tremor of both hands   • Gastrointestinal hemorrhage   • Acute blood loss anemia   • Pancytopenia (HCC)   • Chronic venous hypertension due to DVT   • COPD (chronic obstructive pulmonary disease) (HCC)   • Bleeding hemorrhoid   • Malabsorption of iron   • Iron deficiency anemia   • Pleuritic chest pain   • History of esophageal cancer   • Abnormal CT scan   • Generalized weakness   • Chronic anticoagulation   • Urinary tract infection due to extended-spectrum beta lactamase (ESBL) producing Escherichia coli   • CKD (chronic kidney disease) stage 2, GFR 60-89 ml/min   • Dysphagia         Lymphedema     Row Name 21 0900             Subjective Pain    Able to rate subjective pain? yes  -KD      Pre-Treatment Pain Level 0  -KD      Post-Treatment Pain Level 0  -KD              Subjective Comments    Subjective Comments No issues with bandages yesterday or last night. Pt reports some itching on right lower leg.  -KD              Skin Changes/Observations    Skin Observations Comment NOted a small scratch prox lat right lower leg, also faint rash distal ant right lower leg; very  small scratch ant/med left lower leg  -KD              Manual Lymphatic Drainage    Manual Lymphatic Drainage Comments Short neck, B axillary, B IA, diaphragmatic breathing, L inguinal, L LE (briefly, focus more prox)  -KD              Compression/Skin Care    Compression/Skin Care skin care; wrapping location; bandaging; remove bandages  pt removed bandages independently in clinic  -KD      Skin Care washed/dried; moisturizing lotion applied  HCC to rashy area right ant lower leg  -KD      Wrapping Location lower extremity  -KD      Wrapping Location LE bilateral:; foot to knee  -KD      Bandage Layers cotton liner; padding/fluff layer; short-stretch bandages (comment size/quantity)  -KD      Bandaging Comments TG9, Artiflex x 3, Rosidal K 1-8 cm, 3-10 cm to each leg  -KD      Bandaging Technique circumferential/spiral; light compression; moderate compression  -KD      Compression/Skin Care Comments light to mod application  -KD            User Key  (r) = Recorded By, (t) = Taken By, (c) = Cosigned By    Initials Name Provider Type    Ceci Esposito, PT Physical Therapist                               PT Assessment/Plan     Row Name 12/28/21 0934          PT Assessment    Assessment Comments Pt was able to remove bandages independently in clinic today, so he is to remove them and wash legs in a.m. prior to appt here and bring supplies back in with him. Noted 2 small scratches and one faintly rashy area today on lower legs--applied HCC to rashy area on right leg.  -KD            PT Plan    PT Plan Comments Cont therapy, cont to assess skin scratches and rash for any signs of infection.  -KD           User Key  (r) = Recorded By, (t) = Taken By, (c) = Cosigned By    Initials Name Provider Type    Ceci Esposito, PT Physical Therapist                    OP Exercises     Row Name 12/28/21 0991 12/28/21 0900          Subjective Comments    Subjective Comments -- No issues with bandages yesterday or last night.  Pt reports some itching on right lower leg.  -KD            Subjective Pain    Able to rate subjective pain? -- yes  -KD     Pre-Treatment Pain Level -- 0  -KD     Post-Treatment Pain Level -- 0  -KD            Total Minutes    76127 - PT Manual Therapy Minutes 56  -KD --           User Key  (r) = Recorded By, (t) = Taken By, (c) = Cosigned By    Initials Name Provider Type    Ceci Esposito PT Physical Therapist                        Manual Rx (last 36 hours)     Manual Treatments     Row Name 12/28/21 0938             Total Minutes    71714 - PT Manual Therapy Minutes 56  -KD            User Key  (r) = Recorded By, (t) = Taken By, (c) = Cosigned By    Initials Name Provider Type    Ceci Esposito PT Physical Therapist                    Therapy Education  Education Details: Discussed itchy area on right lower leg--applied HCC today; pt instructed to remove bandages and wash legs in the morning just prior to therapy and to bring bandages/Artiflex back with him; instructed to wash stockinette today and bring back in clean tomorrow  Given: Bandaging/dressing change, Symptoms/condition management  Program: New, Reinforced  How Provided: Verbal  Provided to: Patient  Level of Understanding: Verbalized              Time Calculation:   Start Time: 0816  Stop Time: 0912  Time Calculation (min): 56 min  Total Timed Code Minutes- PT: 56 minute(s)  Timed Charges  82044 - PT Manual Therapy Minutes: 56  Total Minutes  Timed Charges Total Minutes: 56   Total Minutes: 56   Therapy Charges for Today     Code Description Service Date Service Provider Modifiers Qty    [id number] HC PT MANUAL THERAPY EA 15 MIN 12/28/2021 Ceci Ruby, PT KX, GP 4                    Ceci Ruby PT  12/28/2021

## 2021-12-29 ENCOUNTER — HOSPITAL ENCOUNTER (OUTPATIENT)
Dept: PHYSICAL THERAPY | Facility: HOSPITAL | Age: 71
Setting detail: THERAPIES SERIES
Discharge: HOME | End: 2021-12-29

## 2021-12-29 ENCOUNTER — CLINICAL SUPPORT (OUTPATIENT)
Dept: INTERNAL MEDICINE | Age: 71
End: 2021-12-29

## 2021-12-29 DIAGNOSIS — I89.0 LYMPHEDEMA: Primary | ICD-10-CM

## 2021-12-29 PROCEDURE — 97140 MANUAL THERAPY 1/> REGIONS: CPT

## 2021-12-29 PROCEDURE — 96372 THER/PROPH/DIAG INJ SC/IM: CPT | Performed by: INTERNAL MEDICINE

## 2021-12-29 RX ADMIN — CYANOCOBALAMIN 1000 MCG: 1000 INJECTION, SOLUTION INTRAMUSCULAR; SUBCUTANEOUS at 09:35

## 2021-12-29 NOTE — THERAPY TREATMENT NOTE
Outpatient Physical Therapy Lymphedema Treatment Note  [facility]     Patient Name: Jose Hurtado  : 1950  MRN: [id number]  Today's Date: 2021        Visit Date: 2021    Visit Dx:    ICD-10-CM ICD-9-CM   1. Lymphedema  I89.0 457.1       Patient Active Problem List   Diagnosis   • Adenocarcinoma of esophagus (HCC)   • Adenomatous polyp of colon   • Malignant neoplasm of prostate (HCC)   • RLS (restless legs syndrome)   • History of DVT (deep vein thrombosis)   • Recurrent major depressive disorder, in partial remission (HCC)   • Hypertension   • Anemia   • Insomnia due to other mental disorder   • Peripheral polyneuropathy   • B12 deficiency   • Postsurgical malabsorption   • Lymphedema   • Iron deficiency   • Gastroparesis   • Acute deep vein thrombosis (DVT) of popliteal vein of left lower extremity (HCC)   • Tremor of both hands   • Gastrointestinal hemorrhage   • Acute blood loss anemia   • Pancytopenia (HCC)   • Chronic venous hypertension due to DVT   • COPD (chronic obstructive pulmonary disease) (HCC)   • Bleeding hemorrhoid   • Malabsorption of iron   • Iron deficiency anemia   • Pleuritic chest pain   • History of esophageal cancer   • Abnormal CT scan   • Generalized weakness   • Chronic anticoagulation   • Urinary tract infection due to extended-spectrum beta lactamase (ESBL) producing Escherichia coli   • CKD (chronic kidney disease) stage 2, GFR 60-89 ml/min   • Dysphagia         Lymphedema     Row Name 21 0800             Subjective Pain    Able to rate subjective pain? yes  -KA      Pre-Treatment Pain Level 0  -KA      Post-Treatment Pain Level 0  -KA              Subjective Comments    Subjective Comments Pt denies itching or pain today.  Was able to remove bandages at home before coming to therapy.  -KA              Manual Lymphatic Drainage    Manual Lymphatic Drainage Comments Short neck, B axillary, B IA, diaphragmatic breathing, L inguinal, entire LLE  -KA               Compression/Skin Care    Compression/Skin Care skin care; wrapping location; bandaging  -KA      Skin Care washed/dried; moisturizing lotion applied  Hydrocortizone mixed in with Eucerin due to rash on leg  -KA      Wrapping Location lower extremity  -KA      Wrapping Location LE bilateral:; foot to knee  -KA      Bandage Layers cotton liner; padding/fluff layer; short-stretch bandages (comment size/quantity)  -      Bandaging Comments TG9, Artiflex x 3, Rosidal K 1-8 cm, 3-10 cm to each leg  -KA      Bandaging Technique circumferential/spiral; light compression; moderate compression  -KA            User Key  (r) = Recorded By, (t) = Taken By, (c) = Cosigned By    Initials Name Provider Type    Rubina Amaya, PT Physical Therapist                               PT Assessment/Plan     Row Name 12/29/21 0917          PT Assessment    Assessment Comments Pt able to remove bandages prior to appointment.  Began session with MLD, able to address entire LLE today.  Pt continues to have faint raised rash on BLE, mixed hydrocortizone cream with Eucerin and applied to legs prior to bandaging.  Pt continues to respond well to compression bandaging.  -KA            PT Plan    PT Plan Comments Continue to monitor skin, continue MLD and bandaging.  Change bandages tomorrow for weekend.  -           User Key  (r) = Recorded By, (t) = Taken By, (c) = Cosigned By    Initials Name Provider Type    Rubina Amaya, PT Physical Therapist                    OP Exercises     Row Name 12/29/21 0919 12/29/21 0800          Subjective Comments    Subjective Comments -- Pt denies itching or pain today.  Was able to remove bandages at home before coming to therapy.  -KA            Subjective Pain    Able to rate subjective pain? -- yes  -KA     Pre-Treatment Pain Level -- 0  -KA     Post-Treatment Pain Level -- 0  -KA            Total Minutes    54309 - PT Manual Therapy Minutes 56  -KA --           User Key  (r) =  Recorded By, (t) = Taken By, (c) = Cosigned By    Initials Name Provider Type    Rubina Amaya, PT Physical Therapist                        Manual Rx (last 36 hours)     Manual Treatments     Row Name 12/29/21 0919             Total Minutes    05983 - PT Manual Therapy Minutes 56  -KA            User Key  (r) = Recorded By, (t) = Taken By, (c) = Cosigned By    Initials Name Provider Type    Rubina Amaya PT Physical Therapist                                   Time Calculation:   Start Time: 0820  Stop Time: 0916  Time Calculation (min): 56 min  Total Timed Code Minutes- PT: 56 minute(s)  Timed Charges  71913 - PT Manual Therapy Minutes: 56  Total Minutes  Timed Charges Total Minutes: 56   Total Minutes: 56   Therapy Charges for Today     Code Description Service Date Service Provider Modifiers Qty    51634884619 HC PT MANUAL THERAPY EA 15 MIN 12/29/2021 Rubina Powers, PT GP, KX 4                    Rubina Powers, PT  12/29/2021

## 2021-12-30 ENCOUNTER — HOSPITAL ENCOUNTER (OUTPATIENT)
Dept: PHYSICAL THERAPY | Facility: HOSPITAL | Age: 71
Setting detail: THERAPIES SERIES
Discharge: HOME | End: 2021-12-30

## 2021-12-30 DIAGNOSIS — I89.0 LYMPHEDEMA: Primary | ICD-10-CM

## 2021-12-30 PROCEDURE — 97140 MANUAL THERAPY 1/> REGIONS: CPT

## 2021-12-30 NOTE — THERAPY TREATMENT NOTE
Outpatient Physical Therapy Lymphedema Treatment Note  [facility]     Patient Name: Jose Hurtado  : 1950  MRN: [id number]  Today's Date: 2021        Visit Date: 2021    Visit Dx:    ICD-10-CM ICD-9-CM   1. Lymphedema  I89.0 457.1       Patient Active Problem List   Diagnosis   • Adenocarcinoma of esophagus (HCC)   • Adenomatous polyp of colon   • Malignant neoplasm of prostate (HCC)   • RLS (restless legs syndrome)   • History of DVT (deep vein thrombosis)   • Recurrent major depressive disorder, in partial remission (HCC)   • Hypertension   • Anemia   • Insomnia due to other mental disorder   • Peripheral polyneuropathy   • B12 deficiency   • Postsurgical malabsorption   • Lymphedema   • Iron deficiency   • Gastroparesis   • Acute deep vein thrombosis (DVT) of popliteal vein of left lower extremity (HCC)   • Tremor of both hands   • Gastrointestinal hemorrhage   • Acute blood loss anemia   • Pancytopenia (HCC)   • Chronic venous hypertension due to DVT   • COPD (chronic obstructive pulmonary disease) (HCC)   • Bleeding hemorrhoid   • Malabsorption of iron   • Iron deficiency anemia   • Pleuritic chest pain   • History of esophageal cancer   • Abnormal CT scan   • Generalized weakness   • Chronic anticoagulation   • Urinary tract infection due to extended-spectrum beta lactamase (ESBL) producing Escherichia coli   • CKD (chronic kidney disease) stage 2, GFR 60-89 ml/min   • Dysphagia         Lymphedema     Row Name 21 1000             Subjective Pain    Able to rate subjective pain? yes  -KD      Pre-Treatment Pain Level 0  -KD      Post-Treatment Pain Level 0  -KD              Subjective Comments    Subjective Comments Sorry he's running late today-- bandages take longer to remove bandages than he planned.  -KD              Manual Lymphatic Drainage    Manual Lymphatic Drainage Comments Short neck, B axillary, B IA, L inguinal, entire LLE  -KD              Compression/Skin Care     Compression/Skin Care skin care; wrapping location; bandaging  -KD      Skin Care washed/dried; moisturizing lotion applied  Hydrocortizone mixed in with Eucerin due to rash on leg  -KD      Wrapping Location lower extremity  -KD      Wrapping Location LE bilateral:; foot to knee  -KD      Bandage Layers cotton liner; padding/fluff layer; short-stretch bandages (comment size/quantity)  -KD      Bandaging Comments TG9, Artiflex x 3, Rosidal K 1-8 cm, 3-10 cm to each leg  -KD      Bandaging Technique circumferential/spiral; light compression; moderate compression  -KD            User Key  (r) = Recorded By, (t) = Taken By, (c) = Cosigned By    Initials Name Provider Type    Ceci Esposito, PT Physical Therapist                               PT Assessment/Plan     Row Name 12/30/21 1037          PT Assessment    Assessment Comments Skin looks good--scratches appear to be healing, rash on lower legs very faint. No itching currently--continuing mix of HCC/Eucerin to legs & feet for now.  -KD            PT Plan    Predicted Duration of Therapy Intervention (OT) Cont therapy on 1/3/22, assess skin issues, work on edema reduction  -KD           User Key  (r) = Recorded By, (t) = Taken By, (c) = Cosigned By    Initials Name Provider Type    Ceci Esposito, PT Physical Therapist                    OP Exercises     Row Name 12/30/21 1039 12/30/21 1000          Subjective Comments    Subjective Comments -- Sorry he's running late today-- bandages take longer to remove bandages than he planned.  -KD            Subjective Pain    Able to rate subjective pain? -- yes  -KD     Pre-Treatment Pain Level -- 0  -KD     Post-Treatment Pain Level -- 0  -KD            Total Minutes    96863 - PT Manual Therapy Minutes 55  -KD --           User Key  (r) = Recorded By, (t) = Taken By, (c) = Cosigned By    Initials Name Provider Type    Ceci Esposito, PT Physical Therapist                        Manual Rx (last 36 hours)      Manual Treatments     Row Name 12/30/21 1039             Total Minutes    12203 - PT Manual Therapy Minutes 55  -KD            User Key  (r) = Recorded By, (t) = Taken By, (c) = Cosigned By    Initials Name Provider Type    Ceci Esposito, PT Physical Therapist                    Therapy Education  Education Details: Pt instructed to wash bandages and bring back next session on 1/3/22--applied clean bandages today. He was also instructed to wear bandages applied today until 1/3 a.m. if able; if he needs to remove before then he should wear old compression stockings  Given: Edema management  Program: New  How Provided: Verbal  Provided to: Patient  Level of Understanding: Verbalized              Time Calculation:   Start Time: 0925  Stop Time: 1020  Time Calculation (min): 55 min  Total Timed Code Minutes- PT: 55 minute(s)  Timed Charges  72112 - PT Manual Therapy Minutes: 55  Total Minutes  Timed Charges Total Minutes: 55   Total Minutes: 55   Therapy Charges for Today     Code Description Service Date Service Provider Modifiers Qty    97946085223 HC PT MANUAL THERAPY EA 15 MIN 12/30/2021 Ceci Ruby, PT KX, GP 4                    Ceci Ruby PT  12/30/2021

## 2022-01-03 ENCOUNTER — HOSPITAL ENCOUNTER (OUTPATIENT)
Dept: PHYSICAL THERAPY | Facility: HOSPITAL | Age: 72
Setting detail: THERAPIES SERIES
Discharge: HOME | End: 2022-01-03

## 2022-01-03 DIAGNOSIS — I89.0 LYMPHEDEMA: Primary | ICD-10-CM

## 2022-01-03 PROCEDURE — 97110 THERAPEUTIC EXERCISES: CPT

## 2022-01-03 PROCEDURE — 97140 MANUAL THERAPY 1/> REGIONS: CPT

## 2022-01-03 NOTE — THERAPY TREATMENT NOTE
"    Outpatient Physical Therapy Lymphedema Treatment Note  [facility]     Patient Name: Jose Hurtado  : 1950  MRN: [id number]  Today's Date: 1/3/2022        Visit Date: 2022    Visit Dx:    ICD-10-CM ICD-9-CM   1. Lymphedema  I89.0 457.1       Patient Active Problem List   Diagnosis   • Adenocarcinoma of esophagus (HCC)   • Adenomatous polyp of colon   • Malignant neoplasm of prostate (HCC)   • RLS (restless legs syndrome)   • History of DVT (deep vein thrombosis)   • Recurrent major depressive disorder, in partial remission (HCC)   • Hypertension   • Anemia   • Insomnia due to other mental disorder   • Peripheral polyneuropathy   • B12 deficiency   • Postsurgical malabsorption   • Lymphedema   • Iron deficiency   • Gastroparesis   • Acute deep vein thrombosis (DVT) of popliteal vein of left lower extremity (HCC)   • Tremor of both hands   • Gastrointestinal hemorrhage   • Acute blood loss anemia   • Pancytopenia (HCC)   • Chronic venous hypertension due to DVT   • COPD (chronic obstructive pulmonary disease) (HCC)   • Bleeding hemorrhoid   • Malabsorption of iron   • Iron deficiency anemia   • Pleuritic chest pain   • History of esophageal cancer   • Abnormal CT scan   • Generalized weakness   • Chronic anticoagulation   • Urinary tract infection due to extended-spectrum beta lactamase (ESBL) producing Escherichia coli   • CKD (chronic kidney disease) stage 2, GFR 60-89 ml/min   • Dysphagia         Lymphedema     Row Name 22 0800             Subjective Pain    Able to rate subjective pain? yes  -KA      Pre-Treatment Pain Level 0  -KA      Post-Treatment Pain Level 0  -KA              Subjective Comments    Subjective Comments Reports \"I'm running behind today.   The left leg was way down when I took the bandages off this morning, but then it looks like it's a little more swollen now.\"  -KA              Lymphedema Measurements    Measurement Type(s) Circumferential  -KA      Circumferential " Areas Lower extremities  -KA              BLE Circumferential (cm)    Measurement Location 1 Knee  -KA      Left 1 41.5 cm  -KA      Right 1 38.8 cm  -KA      Measurement Location 2 10cm below knee  -KA      Left 2 42.2 cm  -KA      Right 2 39.3 cm  -KA      Measurement Location 3 20cm below knee  -KA      Left 3 41.8 cm  -KA      Right 3 36.9 cm  -KA      Measurement Location 4 30cm below knee  -KA      Left 4 37.6 cm  -KA      Right 4 30.5 cm  -KA      Measurement Location 5 Ankle  -KA      Left 5 30.2 cm  -KA      Right 5 28.6 cm  -KA      Measurement Location 6 Midfoot  -KA      Left 6 25.9 cm  -KA      Right 6 26.7 cm  -KA      LLE Circumferential Total 219.2 cm  -KA      RLE Circumferential Total 200.8 cm  -KA              Manual Lymphatic Drainage    Manual Lymphatic Drainage Comments No MLD today  -KA              Compression/Skin Care    Compression/Skin Care skin care; wrapping location; bandaging  -KA      Skin Care washed/dried; moisturizing lotion applied  Hydrocortizone mixed into Eucerin  -KA      Wrapping Location lower extremity  -KA      Wrapping Location LE bilateral:; foot to knee  -KA      Bandage Layers cotton liner; padding/fluff layer; short-stretch bandages (comment size/quantity)  -KA      Bandaging Comments TG9, Artiflex x 3, Rosidal K 1-8 cm, 3-10 cm to each leg  -KA      Bandaging Technique circumferential/spiral; moderate compression  -KA            User Key  (r) = Recorded By, (t) = Taken By, (c) = Cosigned By    Initials Name Provider Type    Rubina Amaya, PT Physical Therapist                               PT Assessment/Plan     Row Name 01/03/22 0916          PT Assessment    Assessment Comments Significant reduction in girth measurements upon formal reassessment, net decrease of 19.5 cm on LLE and 20.7 cm on RLE since 12/27/21.  Skin condition improving, continued with mix of Eucerin and hydrocortizone.  Pt educated regarding decongestive exercise this visit, able to  "perform all with min cues and will continue as HEP.  Continued with compression bandaging.  -KA            PT Plan    Predicted Duration of Therapy Intervention (OT) Resume MLD, continue compression bandaging.  -KA           User Key  (r) = Recorded By, (t) = Taken By, (c) = Cosigned By    Initials Name Provider Type    Rubina Amaya, PT Physical Therapist                    OP Exercises     Row Name 01/03/22 0917 01/03/22 0800          Subjective Comments    Subjective Comments -- Reports \"I'm running behind today.   The left leg was way down when I took the bandages off this morning, but then it looks like it's a little more swollen now.\"  -KA            Subjective Pain    Able to rate subjective pain? -- yes  -KA     Pre-Treatment Pain Level -- 0  -KA     Post-Treatment Pain Level -- 0  -KA            Total Minutes    22983 - PT Therapeutic Exercise Minutes 10  -KA 10  -KA     79923 - PT Manual Therapy Minutes 35  -KA --            Exercise 1    Exercise Name 1 -- Ankle pumps  -KA     Cueing 1 -- Verbal; Demo  -KA     Reps 1 -- 20  -KA            Exercise 2    Exercise Name 2 -- LAQ  -KA     Cueing 2 -- Verbal; Demo  -KA     Reps 2 -- 20  -KA            Exercise 3    Exercise Name 3 -- Seated march  -KA     Cueing 3 -- Verbal; Demo  -KA     Reps 3 -- 20  -KA            Exercise 4    Exercise Name 4 -- Diaphragmatic Breathing  -KA     Cueing 4 -- Verbal; Demo  -KA     Sets 4 -- 2  -KA     Reps 4 -- 10  -KA           User Key  (r) = Recorded By, (t) = Taken By, (c) = Cosigned By    Initials Name Provider Type    Rubina Amaya, PT Physical Therapist                        Manual Rx (last 36 hours)     Manual Treatments     Row Name 01/03/22 0917             Total Minutes    79890 - PT Manual Therapy Minutes 35  -KA            User Key  (r) = Recorded By, (t) = Taken By, (c) = Cosigned By    Initials Name Provider Type    Rubina Amaya, PT Physical Therapist                 PT OP Goals     Row " Name 01/03/22 0900          PT Short Term Goals    STG Date to Achieve 11/18/21  -KA     STG 1 Pt demo awareness of condition and precautions for improved prevention, management, care of symptoms, and ease of transition to self-care of condition.  -KA     STG 1 Progress Met  -     STG 2 Pt demo decreased net edema of >/=5-10cm for decreased edema symptoms, decreased risk of infection, and improved skin care.  -KA     STG 2 Progress Met  -     STG 2 Progress Comments Net decreased edema 19.5 on LLE, 20.7 on RLE  -KA     STG 3 Patient independent and compliant with home exercise program (as able) focused on range of motion and flexibility to improve lymphatic flow and discomfort.  -KA     STG 3 Progress New  -     STG 3 Progress Comments Educated and provided with written HEP today, assess compliance next week  -            Long Term Goals    LTG Date to Achieve 03/22/21  -KA     LTG 1 Pt/family independent with self care techniques for self-management of condition.  -KA     LTG 1 Progress New  -KA     LTG 2 Pt demo decreased net edema of >/=10-20cm for decreased edema symptoms, decreased risk of infection, and improved skin care.  -KA     LTG 2 Progress Met  -     LTG 2 Progress Comments Net decreased edema 19.5 on LLE, 20.7 on RLE  -KA     LTG 3 Pt/family independent with compression garments as indicated for self-management of condition.  -     LTG 3 Progress New  -            Time Calculation    PT Goal Re-Cert Due Date 01/17/22  -           User Key  (r) = Recorded By, (t) = Taken By, (c) = Cosigned By    Initials Name Provider Type    Rubina Amaya, MANDA Physical Therapist                Therapy Education  Education Details: Discussed girth measurements.  Discussed option of having family member come in to observe bandaging, pt reports he can do all but the bandage on foot.  Educated regarding decongestive exercise and benefits, provided with written HEP handouts.  Given: HEP,  Symptoms/condition management, Bandaging/dressing change  Program: New  How Provided: Verbal, Written, Demonstration  Provided to: Patient  Level of Understanding: Verbalized              Time Calculation:   Start Time: 0925  Stop Time: 1010  Time Calculation (min): 45 min  Total Timed Code Minutes- PT: 45 minute(s)  Timed Charges  64560 - PT Therapeutic Exercise Minutes: 10  21000 - PT Manual Therapy Minutes: 35  Total Minutes  Timed Charges Total Minutes: 45   Total Minutes: 45   Therapy Charges for Today     Code Description Service Date Service Provider Modifiers Qty    73283603195 HC PT THER PROC EA 15 MIN 1/3/2022 Rubina Powers, PT GP 1    78819382924 HC PT MANUAL THERAPY EA 15 MIN 1/3/2022 Rubina Powers, PT GP 2                    Rubina Powers, PT  1/3/2022

## 2022-01-04 ENCOUNTER — HOSPITAL ENCOUNTER (OUTPATIENT)
Dept: PHYSICAL THERAPY | Facility: HOSPITAL | Age: 72
Setting detail: THERAPIES SERIES
Discharge: HOME | End: 2022-01-04

## 2022-01-04 DIAGNOSIS — I89.0 LYMPHEDEMA: Primary | ICD-10-CM

## 2022-01-04 PROCEDURE — 97140 MANUAL THERAPY 1/> REGIONS: CPT

## 2022-01-04 NOTE — THERAPY TREATMENT NOTE
Outpatient Physical Therapy Lymphedema Treatment Note  [facility]     Patient Name: Jose Hurtado  : 1950  MRN: [id number]  Today's Date: 2022        Visit Date: 2022    Visit Dx:    ICD-10-CM ICD-9-CM   1. Lymphedema  I89.0 457.1       Patient Active Problem List   Diagnosis   • Adenocarcinoma of esophagus (HCC)   • Adenomatous polyp of colon   • Malignant neoplasm of prostate (HCC)   • RLS (restless legs syndrome)   • History of DVT (deep vein thrombosis)   • Recurrent major depressive disorder, in partial remission (HCC)   • Hypertension   • Anemia   • Insomnia due to other mental disorder   • Peripheral polyneuropathy   • B12 deficiency   • Postsurgical malabsorption   • Lymphedema   • Iron deficiency   • Gastroparesis   • Acute deep vein thrombosis (DVT) of popliteal vein of left lower extremity (HCC)   • Tremor of both hands   • Gastrointestinal hemorrhage   • Acute blood loss anemia   • Pancytopenia (HCC)   • Chronic venous hypertension due to DVT   • COPD (chronic obstructive pulmonary disease) (HCC)   • Bleeding hemorrhoid   • Malabsorption of iron   • Iron deficiency anemia   • Pleuritic chest pain   • History of esophageal cancer   • Abnormal CT scan   • Generalized weakness   • Chronic anticoagulation   • Urinary tract infection due to extended-spectrum beta lactamase (ESBL) producing Escherichia coli   • CKD (chronic kidney disease) stage 2, GFR 60-89 ml/min   • Dysphagia         Lymphedema     Row Name 22 1000             Subjective Pain    Able to rate subjective pain? yes  -KD      Pre-Treatment Pain Level 0  -KD      Post-Treatment Pain Level 0  -KD              Subjective Comments    Subjective Comments States he's been working on his exercises he was taught yesterday. Denies itching.  -KD              Skin Changes/Observations    Skin Observations Comment No  notable rash/scartches observed today.  -KD              Manual Lymphatic Drainage    Manual Lymphatic  Drainage Comments Short neck, B axilla, B IA, diaphragmatic breathing, L inguinal, L LE  -KD              Compression/Skin Care    Compression/Skin Care skin care; wrapping location; bandaging  -KD      Skin Care moisturizing lotion applied  Hydrocortizone mixed into Eucerin  -KD      Wrapping Location lower extremity  -KD      Wrapping Location LE bilateral:; foot to knee  -KD      Wrapping Comments Tg9, artiflex, short stretch bandages 1-8cm, 3-10cm on right , 4-10cm on left.  -KD      Bandage Layers cotton liner; padding/fluff layer; short-stretch bandages (comment size/quantity)  -KD      Bandaging Technique circumferential/spiral; moderate compression  -KD            User Key  (r) = Recorded By, (t) = Taken By, (c) = Cosigned By    Initials Name Provider Type    Ceci Esposito, PT Physical Therapist                               PT Assessment/Plan     Row Name 01/04/22 1056          PT Assessment    Assessment Comments Pt considering other options for long term compression. Feel that either the Juxtalite HD velcro device or a dual layer stocking system would be beneficial. He was not satisfied with the Juzo velcro devices, but says he would be willing to try the CircAid product.  -KD            PT Plan    PT Plan Comments Cont CDT, further discuss long term compression options.  -KD           User Key  (r) = Recorded By, (t) = Taken By, (c) = Cosigned By    Initials Name Provider Type    Ceci sEposito, PT Physical Therapist                    OP Exercises     Row Name 01/04/22 1100 01/04/22 1000          Subjective Comments    Subjective Comments -- States he's been working on his exercises he was taught yesterday. Denies itching.  -KD            Subjective Pain    Able to rate subjective pain? -- yes  -KD     Pre-Treatment Pain Level -- 0  -KD     Post-Treatment Pain Level -- 0  -KD            Total Minutes    33915 - PT Manual Therapy Minutes 58  -KD --           User Key  (r) = Recorded By, (t) =  Taken By, (c) = Cosigned By    Initials Name Provider Type    Ceci Esposito, PT Physical Therapist                        Manual Rx (last 36 hours)     Manual Treatments     Row Name 01/04/22 1100             Total Minutes    58952 - PT Manual Therapy Minutes 58  -KD            User Key  (r) = Recorded By, (t) = Taken By, (c) = Cosigned By    Initials Name Provider Type    Ceci Esposito PT Physical Therapist                    Therapy Education  Education Details: Pt brought Juzo velcro device, says he was not very satisfied with it. Discussed CircAid Juxtalite HD or a dual layer stocking system as possible options for long term compression.  Given: Edema management  Program: New, Reinforced  How Provided: Verbal  Provided to: Patient  Level of Understanding: Verbalized              Time Calculation:   Start Time: 0814  Stop Time: 0912  Time Calculation (min): 58 min  Total Timed Code Minutes- PT: 58 minute(s)  Timed Charges  47358 - PT Manual Therapy Minutes: 58  Total Minutes  Timed Charges Total Minutes: 58   Total Minutes: 58   Therapy Charges for Today     Code Description Service Date Service Provider Modifiers Qty    78072603200 HC PT MANUAL THERAPY EA 15 MIN 1/4/2022 eCci Ruby, PT GP 4                    Ceci Ruby PT  1/4/2022

## 2022-01-05 ENCOUNTER — HOSPITAL ENCOUNTER (OUTPATIENT)
Dept: PHYSICAL THERAPY | Facility: HOSPITAL | Age: 72
Setting detail: THERAPIES SERIES
Discharge: HOME | End: 2022-01-05

## 2022-01-05 DIAGNOSIS — I89.0 LYMPHEDEMA: Primary | ICD-10-CM

## 2022-01-05 PROCEDURE — 97140 MANUAL THERAPY 1/> REGIONS: CPT

## 2022-01-05 NOTE — THERAPY TREATMENT NOTE
"    Outpatient Physical Therapy Lymphedema Treatment Note  [facility]     Patient Name: Jose Hurtado  : 1950  MRN: [id number]  Today's Date: 2022        Visit Date: 2022    Visit Dx:    ICD-10-CM ICD-9-CM   1. Lymphedema  I89.0 457.1       Patient Active Problem List   Diagnosis   • Adenocarcinoma of esophagus (HCC)   • Adenomatous polyp of colon   • Malignant neoplasm of prostate (HCC)   • RLS (restless legs syndrome)   • History of DVT (deep vein thrombosis)   • Recurrent major depressive disorder, in partial remission (HCC)   • Hypertension   • Anemia   • Insomnia due to other mental disorder   • Peripheral polyneuropathy   • B12 deficiency   • Postsurgical malabsorption   • Lymphedema   • Iron deficiency   • Gastroparesis   • Acute deep vein thrombosis (DVT) of popliteal vein of left lower extremity (HCC)   • Tremor of both hands   • Gastrointestinal hemorrhage   • Acute blood loss anemia   • Pancytopenia (HCC)   • Chronic venous hypertension due to DVT   • COPD (chronic obstructive pulmonary disease) (HCC)   • Bleeding hemorrhoid   • Malabsorption of iron   • Iron deficiency anemia   • Pleuritic chest pain   • History of esophageal cancer   • Abnormal CT scan   • Generalized weakness   • Chronic anticoagulation   • Urinary tract infection due to extended-spectrum beta lactamase (ESBL) producing Escherichia coli   • CKD (chronic kidney disease) stage 2, GFR 60-89 ml/min   • Dysphagia         Lymphedema     Row Name 22 0800             Subjective Pain    Able to rate subjective pain? yes  -KA      Pre-Treatment Pain Level 0  -KA      Post-Treatment Pain Level 0  -KA              Subjective Comments    Subjective Comments Pt reports, \"Everything is going well.  I brought back clean bandages today.\"  -KA              Manual Lymphatic Drainage    Manual Lymphatic Drainage Comments Short neck, B axilla, B IA, diaphragmatic breathing, R inguinal, RLE  -KA              Compression/Skin Care " "   Compression/Skin Care skin care; wrapping location; bandaging  -      Skin Care moisturizing lotion applied  -      Wrapping Location lower extremity  -      Wrapping Location LE bilateral:; foot to knee  -      Bandage Layers cotton liner; padding/fluff layer; short-stretch bandages (comment size/quantity)  -      Bandaging Comments TG9, Artiflex x 3, Rosidal K 1-8 cm, 3-10 cm to each leg  -      Bandaging Technique circumferential/spiral; moderate compression  -            User Key  (r) = Recorded By, (t) = Taken By, (c) = Cosigned By    Initials Name Provider Type    Rubina Amaya, PT Physical Therapist                               PT Assessment/Plan     Row Name 01/05/22 0907          PT Assessment    Assessment Comments Performed MLD to RLE today, good response with near normal appearance of RLE post-treatment.  Continued with compression bandaging, moderate compression, spiral wrap pattern.  Will resume MLD to LLE next visit.  -            PT Plan    PT Plan Comments Change to new bandages, perform MLD to LLE next visit.  -           User Key  (r) = Recorded By, (t) = Taken By, (c) = Cosigned By    Initials Name Provider Type    Rubina Amaya, PT Physical Therapist                    OP Exercises     Row Name 01/05/22 0908 01/05/22 0800          Subjective Comments    Subjective Comments -- Pt reports, \"Everything is going well.  I brought back clean bandages today.\"  -KA            Subjective Pain    Able to rate subjective pain? -- yes  -KA     Pre-Treatment Pain Level -- 0  -KA     Post-Treatment Pain Level -- 0  -KA            Total Minutes    00873 - PT Manual Therapy Minutes 54  -KA --           User Key  (r) = Recorded By, (t) = Taken By, (c) = Cosigned By    Initials Name Provider Type    Rubina Amaya, PT Physical Therapist                        Manual Rx (last 36 hours)     Manual Treatments     Row Name 01/05/22 0908             Total Minutes    82567 - PT " Manual Therapy Minutes 54  -KA            User Key  (r) = Recorded By, (t) = Taken By, (c) = Cosigned By    Initials Name Provider Type    Rubina Amaya, PT Physical Therapist                                   Time Calculation:   Start Time: 0815  Stop Time: 0909  Time Calculation (min): 54 min  Total Timed Code Minutes- PT: 54 minute(s)  Timed Charges  57006 - PT Manual Therapy Minutes: 54  Total Minutes  Timed Charges Total Minutes: 54   Total Minutes: 54   Therapy Charges for Today     Code Description Service Date Service Provider Modifiers Qty    78769142032  PT MANUAL THERAPY EA 15 MIN 1/5/2022 Rubina Powers, PT GP 4                    Rubina Powers, PT  1/5/2022

## 2022-01-06 ENCOUNTER — HOSPITAL ENCOUNTER (OUTPATIENT)
Dept: PHYSICAL THERAPY | Facility: HOSPITAL | Age: 72
Setting detail: THERAPIES SERIES
Discharge: HOME | End: 2022-01-06

## 2022-01-06 DIAGNOSIS — I89.0 LYMPHEDEMA: Primary | ICD-10-CM

## 2022-01-06 PROCEDURE — 97140 MANUAL THERAPY 1/> REGIONS: CPT

## 2022-01-06 NOTE — THERAPY TREATMENT NOTE
Outpatient Physical Therapy Lymphedema Treatment Note  [facility]     Patient Name: Jose Hurtado  : 1950  MRN: [id number]  Today's Date: 2022        Visit Date: 2022    Visit Dx:    ICD-10-CM ICD-9-CM   1. Lymphedema  I89.0 457.1       Patient Active Problem List   Diagnosis   • Adenocarcinoma of esophagus (HCC)   • Adenomatous polyp of colon   • Malignant neoplasm of prostate (HCC)   • RLS (restless legs syndrome)   • History of DVT (deep vein thrombosis)   • Recurrent major depressive disorder, in partial remission (HCC)   • Hypertension   • Anemia   • Insomnia due to other mental disorder   • Peripheral polyneuropathy   • B12 deficiency   • Postsurgical malabsorption   • Lymphedema   • Iron deficiency   • Gastroparesis   • Acute deep vein thrombosis (DVT) of popliteal vein of left lower extremity (HCC)   • Tremor of both hands   • Gastrointestinal hemorrhage   • Acute blood loss anemia   • Pancytopenia (HCC)   • Chronic venous hypertension due to DVT   • COPD (chronic obstructive pulmonary disease) (HCC)   • Bleeding hemorrhoid   • Malabsorption of iron   • Iron deficiency anemia   • Pleuritic chest pain   • History of esophageal cancer   • Abnormal CT scan   • Generalized weakness   • Chronic anticoagulation   • Urinary tract infection due to extended-spectrum beta lactamase (ESBL) producing Escherichia coli   • CKD (chronic kidney disease) stage 2, GFR 60-89 ml/min   • Dysphagia         Lymphedema     Row Name 22 0800             Subjective Pain    Able to rate subjective pain? yes  -KD      Pre-Treatment Pain Level 0  -KD      Post-Treatment Pain Level 0  -KD              Subjective Comments    Subjective Comments States he's sorry he's late--requests just bandaging today.  -KD              Manual Lymphatic Drainage    Manual Lymphatic Drainage Comments no MLD today  -KD              Compression/Skin Care    Compression/Skin Care skin care; wrapping location; bandaging  -KD       Skin Care moisturizing lotion applied  -KD      Wrapping Location lower extremity  -KD      Wrapping Location LE bilateral:; foot to knee  -KD      Bandage Layers cotton liner; padding/fluff layer; short-stretch bandages (comment size/quantity)  -KD      Bandaging Comments TG9, Artiflex x 3, Rosidal K 1-8 cm, 3-10 cm to each leg  -KD      Bandaging Technique circumferential/spiral; moderate compression  -KD            User Key  (r) = Recorded By, (t) = Taken By, (c) = Cosigned By    Initials Name Provider Type    Ceci Esposito, PT Physical Therapist                               PT Assessment/Plan     Row Name 01/06/22 0904          PT Assessment    Assessment Comments Legs look good! RLE appears close to normal in terms of skin integrity, contours, and edema. LLE edema appears to be decreasing.  -KD            PT Plan    PT Plan Comments Cont therapy 3X next week.  -KD           User Key  (r) = Recorded By, (t) = Taken By, (c) = Cosigned By    Initials Name Provider Type    Ceci Esposito, PT Physical Therapist                    OP Exercises     Row Name 01/06/22 0907 01/06/22 0800          Subjective Comments    Subjective Comments -- States he's sorry he's late--requests just bandaging today.  -KD            Subjective Pain    Able to rate subjective pain? -- yes  -KD     Pre-Treatment Pain Level -- 0  -KD     Post-Treatment Pain Level -- 0  -KD            Total Minutes    39541 - PT Manual Therapy Minutes 30  -KD --           User Key  (r) = Recorded By, (t) = Taken By, (c) = Cosigned By    Initials Name Provider Type    Ceci Esposito, PT Physical Therapist                        Manual Rx (last 36 hours)     Manual Treatments     Row Name 01/06/22 0907             Total Minutes    16904 - PT Manual Therapy Minutes 30  -KD            User Key  (r) = Recorded By, (t) = Taken By, (c) = Cosigned By    Initials Name Provider Type    Ceci Esposito, PT Physical Therapist                    Therapy  Education  Education Details: bandage wear schedule for long weekend  Given: Bandaging/dressing change, Edema management  Program: Reinforced  How Provided: Verbal  Provided to: Patient  Level of Understanding: Teach back education performed              Time Calculation:   Start Time: 0824  Stop Time: 0854  Time Calculation (min): 30 min  Total Timed Code Minutes- PT: 30 minute(s)  Timed Charges  81191 - PT Manual Therapy Minutes: 30  Total Minutes  Timed Charges Total Minutes: 30   Total Minutes: 30   Therapy Charges for Today     Code Description Service Date Service Provider Modifiers Qty    51466804010 HC PT MANUAL THERAPY EA 15 MIN 1/6/2022 Ceci Ruby, PT GP 2                    Ceci Ruby, PT  1/6/2022

## 2022-01-11 ENCOUNTER — APPOINTMENT (OUTPATIENT)
Dept: PHYSICAL THERAPY | Facility: HOSPITAL | Age: 72
End: 2022-01-11

## 2022-01-13 ENCOUNTER — OFFICE VISIT (OUTPATIENT)
Dept: INTERNAL MEDICINE | Age: 72
End: 2022-01-13

## 2022-01-13 ENCOUNTER — HOSPITAL ENCOUNTER (OUTPATIENT)
Dept: PHYSICAL THERAPY | Facility: HOSPITAL | Age: 72
Setting detail: THERAPIES SERIES
Discharge: HOME | End: 2022-01-13

## 2022-01-13 VITALS
HEIGHT: 77 IN | TEMPERATURE: 98 F | DIASTOLIC BLOOD PRESSURE: 58 MMHG | WEIGHT: 223 LBS | HEART RATE: 65 BPM | SYSTOLIC BLOOD PRESSURE: 120 MMHG | BODY MASS INDEX: 26.33 KG/M2 | OXYGEN SATURATION: 98 %

## 2022-01-13 DIAGNOSIS — I89.0 LYMPHEDEMA: Primary | ICD-10-CM

## 2022-01-13 DIAGNOSIS — G25.81 RLS (RESTLESS LEGS SYNDROME): Chronic | ICD-10-CM

## 2022-01-13 DIAGNOSIS — F33.41 RECURRENT MAJOR DEPRESSIVE DISORDER, IN PARTIAL REMISSION: Chronic | ICD-10-CM

## 2022-01-13 DIAGNOSIS — I10 PRIMARY HYPERTENSION: Primary | Chronic | ICD-10-CM

## 2022-01-13 PROCEDURE — 97140 MANUAL THERAPY 1/> REGIONS: CPT

## 2022-01-13 PROCEDURE — 99214 OFFICE O/P EST MOD 30 MIN: CPT | Performed by: INTERNAL MEDICINE

## 2022-01-13 NOTE — THERAPY TREATMENT NOTE
Outpatient Physical Therapy Lymphedema Treatment Note  [facility]     Patient Name: Jose Hurtado  : 1950  MRN: [id number]  Today's Date: 2022        Visit Date: 2022    Visit Dx:    ICD-10-CM ICD-9-CM   1. Lymphedema  I89.0 457.1       Patient Active Problem List   Diagnosis   • Adenocarcinoma of esophagus (HCC)   • Adenomatous polyp of colon   • Malignant neoplasm of prostate (HCC)   • RLS (restless legs syndrome)   • History of DVT (deep vein thrombosis)   • Recurrent major depressive disorder, in partial remission (HCC)   • Hypertension   • Anemia   • Insomnia due to other mental disorder   • Peripheral polyneuropathy   • B12 deficiency   • Postsurgical malabsorption   • Lymphedema   • Iron deficiency   • Gastroparesis   • Acute deep vein thrombosis (DVT) of popliteal vein of left lower extremity (HCC)   • Tremor of both hands   • Gastrointestinal hemorrhage   • Acute blood loss anemia   • Pancytopenia (HCC)   • Chronic venous hypertension due to DVT   • COPD (chronic obstructive pulmonary disease) (HCC)   • Bleeding hemorrhoid   • Malabsorption of iron   • Iron deficiency anemia   • Pleuritic chest pain   • History of esophageal cancer   • Abnormal CT scan   • Generalized weakness   • Chronic anticoagulation   • Urinary tract infection due to extended-spectrum beta lactamase (ESBL) producing Escherichia coli   • CKD (chronic kidney disease) stage 2, GFR 60-89 ml/min   • Dysphagia         Lymphedema     Row Name 22 1200             Subjective Pain    Able to rate subjective pain? yes  -KD      Pre-Treatment Pain Level 0  -KD      Post-Treatment Pain Level 0  -KD              Subjective Comments    Subjective Comments States he was having walking issues on , so cancelled here. Got a gel injection in left knee.  -KD              Posture/Observations    Posture/Observations Comments Band-aid on left knee  -KD              Lymphedema Edema Assessment    Edema Assessment Comment  LLE still pretty edematous  -KD              Manual Lymphatic Drainage    Manual Lymphatic Drainage Comments Short neck, B axilla, B IA, L inguinal, LLE  -KD              Compression/Skin Care    Compression/Skin Care skin care; wrapping location; bandaging  -KD      Skin Care moisturizing lotion applied  -KD      Wrapping Location lower extremity  -KD      Wrapping Location LE bilateral:; foot to knee  -KD      Bandage Layers cotton liner; padding/fluff layer; short-stretch bandages (comment size/quantity)  -KD      Bandaging Comments TG9, Artiflex x 3 right/ Artiflex ft & ankle left, Rosidal Soft left ankle to knee, Rosidal K 1-8 cm, 3-10 cm to each leg. Socks & shoes.  -KD      Bandaging Technique circumferential/spiral; moderate compression  -KD            User Key  (r) = Recorded By, (t) = Taken By, (c) = Cosigned By    Initials Name Provider Type    Ceci Esposito, PT Physical Therapist                               PT Assessment/Plan     Row Name 01/13/22 1237          PT Assessment    Assessment Comments Added foam padding to bandaging today to encourage edema softening & reduction.  -KD            PT Plan    PT Plan Comments Cont therapy, further discussion re: long term compression. Monitor tolerance/response to foam padding.  -KD           User Key  (r) = Recorded By, (t) = Taken By, (c) = Cosigned By    Initials Name Provider Type    Ceci Esposito, PT Physical Therapist                    OP Exercises     Row Name 01/13/22 1241 01/13/22 1200          Subjective Comments    Subjective Comments -- States he was having walking issues on 1/11, so cancelled here. Got a gel injection in left knee.  -KD            Subjective Pain    Able to rate subjective pain? -- yes  -KD     Pre-Treatment Pain Level -- 0  -KD     Post-Treatment Pain Level -- 0  -KD            Total Minutes    85156 - PT Manual Therapy Minutes 53  -KD --           User Key  (r) = Recorded By, (t) = Taken By, (c) = Cosigned By     Initials Name Provider Type    Ceci Esposito, MANDA Physical Therapist                        Manual Rx (last 36 hours)     Manual Treatments     Row Name 01/13/22 1241             Total Minutes    26570 - PT Manual Therapy Minutes 53  -KD            User Key  (r) = Recorded By, (t) = Taken By, (c) = Cosigned By    Initials Name Provider Type    Ceci Esposito PT Physical Therapist                    Therapy Education  Education Details: Discussed adding foam padding to left leg bandaging regimen.  Given: Bandaging/dressing change  Program: New  How Provided: Verbal  Provided to: Patient  Level of Understanding: Verbalized              Time Calculation:   Start Time: 0820  Stop Time: 0913  Time Calculation (min): 53 min  Total Timed Code Minutes- PT: 53 minute(s)  Timed Charges  50747 - PT Manual Therapy Minutes: 53  Total Minutes  Timed Charges Total Minutes: 53   Total Minutes: 53   Therapy Charges for Today     Code Description Service Date Service Provider Modifiers Qty    85404148046 HC PT MANUAL THERAPY EA 15 MIN 1/13/2022 Ceci Ruby, PT GP 4                    Ceci Ruby PT  1/13/2022

## 2022-01-13 NOTE — PROGRESS NOTES
"    I N T E R N A L  M E D I C I N E  J U N O H  K I M,  M D      ENCOUNTER DATE:  01/13/2022    Jose FITCH Hurtado / 71 y.o. / male      CHIEF COMPLAINT / REASON FOR OFFICE VISIT     Hypertension and Depression      ASSESSMENT & PLAN     Problem List Items Addressed This Visit        High    Hypertension - Primary (Chronic)    Overview     Continue metoprolol tartrate 25 mg BID.          Relevant Medications    metoprolol tartrate (LOPRESSOR) 25 MG tablet    furosemide (LASIX) 20 MG tablet       Medium    RLS (restless legs syndrome) (Chronic)    Overview     Continue pramipexole 1.5 mg two daily.          Relevant Medications    pramipexole (MIRAPEX) 1.5 MG tablet    Recurrent major depressive disorder, in partial remission (HCC) (Chronic)    Overview     Continue paroxetine 40 mg qd         Relevant Medications    PARoxetine (PAXIL) 40 MG tablet        No orders of the defined types were placed in this encounter.    No orders of the defined types were placed in this encounter.      SUMMARY/DISCUSSION  •       Next Appointment with me: Visit date not found    Return in about 6 months (around 7/13/2022) for COMBINED AWV AND MEDICAL F/U (30 MIN).      VITAL SIGNS     Visit Vitals  /58 (BP Location: Left arm)   Pulse 65   Temp 98 °F (36.7 °C)   Ht 195.6 cm (77.01\")   Wt 101 kg (223 lb)   SpO2 98%   BMI 26.44 kg/m²       BP Readings from Last 3 Encounters:   01/13/22 120/58   12/28/21 146/75   11/18/21 126/75     Wt Readings from Last 3 Encounters:   01/13/22 101 kg (223 lb)   12/28/21 99.2 kg (218 lb 11.2 oz)   11/18/21 91.2 kg (201 lb)     Body mass index is 26.44 kg/m².      MEDICATIONS AT THE TIME OF OFFICE VISIT     Current Outpatient Medications on File Prior to Visit   Medication Sig   • albuterol sulfate  (90 Base) MCG/ACT inhaler Inhale 1 puff Every 4 (Four) Hours As Needed for Wheezing.   • Eliquis 5 MG tablet tablet TAKE ONE TABLET BY MOUTH EVERY 12 HOURS   • furosemide (LASIX) 20 MG tablet TAKE " ONE TABLET BY MOUTH DAILY   • metoprolol tartrate (LOPRESSOR) 25 MG tablet TAKE ONE TABLET BY MOUTH TWICE A DAY   • pantoprazole (PROTONIX) 40 MG EC tablet TAKE ONE TABLET BY MOUTH TWICE A DAY BEFORE A MEAL   • PARoxetine (PAXIL) 40 MG tablet TAKE ONE TABLET BY MOUTH EVERY MORNING   • potassium chloride (MICRO-K) 10 MEQ CR capsule TAKE ONE CAPSULE BY MOUTH DAILY   • pramipexole (MIRAPEX) 1.5 MG tablet TAKE ONE TABLET BY MOUTH TWICE A DAY (Patient taking differently: 3 mg Every Night. 2 tabs of 1.5 mg)   • [DISCONTINUED] potassium chloride (MICRO-K) 10 MEQ CR capsule    • ketoconazole (NIZORAL) 2 % cream      Current Facility-Administered Medications on File Prior to Visit   Medication   • cyanocobalamin injection 1,000 mcg          HISTORY OF PRESENT ILLNESS     Denies significant changes or problems. Depression is stable. Hypertension stable.       Patient Care Team:  Brandin Bolton MD as PCP - General (Internal Medicine)  Tapan, George THORPE II, MD as Consulting Physician (Hematology and Oncology)  Jose Inman MD as Consulting Physician (Urology)  Mulugeta Millan MD as Consulting Physician (Gastroenterology)  Jose Christine III, MD as Referring Physician (Thoracic Surgery)  Seth Randhawa MD as Consulting Physician (Ophthalmology)  James Cyr MD as Consulting Physician (Cardiology)    REVIEW OF SYSTEMS     Review of Systems   Constitutional neg except per HPI   Resp neg  CV neg   No significant change in mood or cognition     PHYSICAL EXAMINATION     Physical Exam  General: No acute distress  Psych: Normal thought and judgment   Cardiovascular Rate: normal. Rhythm: regular. Heart sounds: normal.   Pulm/Chest: Effort normal, breath sounds normal.       REVIEWED DATA     Labs:     Lab Results   Component Value Date     06/04/2021    K 4.6 06/04/2021    CALCIUM 8.9 06/04/2021    AST 18 05/19/2021    ALT 16 05/19/2021    BUN 14 06/04/2021    CREATININE 1.16 06/04/2021    CREATININE 1.59  (H) 05/19/2021    CREATININE 1.45 (H) 05/13/2021    EGFRIFNONA 62 06/04/2021    EGFRIFAFRI 75 06/04/2021       Lab Results   Component Value Date    HGBA1C 5.50 08/06/2021    HGBA1C 6.20 (H) 01/17/2020       Lab Results   Component Value Date    LDL 92 05/10/2018     (H) 01/30/2018    HDL 45 05/10/2018    TRIG 79 05/10/2018       Lab Results   Component Value Date    TSH 2.710 01/17/2020    TSH 2.050 06/06/2019    TSH 3.440 02/13/2019    FREET4 1.52 01/17/2020    FREET4 1.18 06/06/2019    FREET4 1.33 02/13/2019       Lab Results   Component Value Date    WBC 5.32 12/28/2021    HGB 10.4 (L) 12/28/2021     12/28/2021       No results found for: MICROALBUR        Imaging:           Medical Tests:           Summary of old records / correspondence / consultant report:           Request outside records:             *Examiner was wearing KN95 mask and eye protection during the entire duration of the visit. Patient was masked the entire time. Minimum social distance of 6 ft maintained entire visit except if physical contact was necessary as documented.     **Dragon Disclaimer: Much of this encounter note is an electronic transcription/translation of spoken language to printed text. The electronic translation of spoken language may permit erroneous, or at times, nonsensical words or phrases to be inadvertently transcribed. Although I have reviewed the note for such errors, some may still exist.       Template created by Moose Bolton MD

## 2022-01-14 ENCOUNTER — HOSPITAL ENCOUNTER (OUTPATIENT)
Dept: PHYSICAL THERAPY | Facility: HOSPITAL | Age: 72
Setting detail: THERAPIES SERIES
Discharge: HOME | End: 2022-01-14

## 2022-01-14 DIAGNOSIS — I89.0 LYMPHEDEMA: Primary | ICD-10-CM

## 2022-01-14 PROCEDURE — 97140 MANUAL THERAPY 1/> REGIONS: CPT

## 2022-01-14 PROCEDURE — 97535 SELF CARE MNGMENT TRAINING: CPT

## 2022-01-14 NOTE — THERAPY PROGRESS REPORT/RE-CERT
"    Outpatient Physical Therapy Lymphedema Progress Note  [facility]     Patient Name: Jose Hurtado  : 1950  MRN: [id number]  Today's Date: 2022        Visit Date: 2022    Visit Dx:    ICD-10-CM ICD-9-CM   1. Lymphedema  I89.0 457.1       Patient Active Problem List   Diagnosis   • Adenocarcinoma of esophagus (HCC)   • Adenomatous polyp of colon   • Malignant neoplasm of prostate (HCC)   • RLS (restless legs syndrome)   • History of DVT (deep vein thrombosis)   • Recurrent major depressive disorder, in partial remission (HCC)   • Hypertension   • Anemia   • Insomnia due to other mental disorder   • Peripheral polyneuropathy   • B12 deficiency   • Postsurgical malabsorption   • Lymphedema   • Iron deficiency   • Gastroparesis   • Acute deep vein thrombosis (DVT) of popliteal vein of left lower extremity (HCC)   • Tremor of both hands   • Gastrointestinal hemorrhage   • Acute blood loss anemia   • Pancytopenia (HCC)   • Chronic venous hypertension due to DVT   • COPD (chronic obstructive pulmonary disease) (HCC)   • Bleeding hemorrhoid   • Malabsorption of iron   • Iron deficiency anemia   • Pleuritic chest pain   • History of esophageal cancer   • Abnormal CT scan   • Generalized weakness   • Chronic anticoagulation   • Urinary tract infection due to extended-spectrum beta lactamase (ESBL) producing Escherichia coli   • CKD (chronic kidney disease) stage 2, GFR 60-89 ml/min   • Dysphagia         Lymphedema     Row Name 22 0800             Subjective Pain    Able to rate subjective pain? yes  -KA      Pre-Treatment Pain Level 0  -KA      Post-Treatment Pain Level 0  -KA              Subjective Comments    Subjective Comments Reports \"I think my right leg is as small as it's going to get.  The left leg still probably has some room to decrease.\"  -KA              Physical Concerns    The amount of pain associated with my lymphedema is: 1  -KA      The amount of limb heaviness associated with " "my lymphedema is: 2  -KA      The amount of skin tightness associated with my lymphedema is: 3  -KA      The size of my swollen limb(s) seems: 2  -KA      Lymphedema affects the movement of my swollen limb(s): 2  -KA      The strength in my swollen limb(s) is: 2  -KA              Psychosocial Concerns    Lymphedema affects my body image (i.e., \"how I think I look\"). 2  -KA      Lymphedema affects my socializing with others. 1  -KA      Lymphedema affects my intimate relations with spouse or partner (rate 0 if not applicable 0  -KA      Lymphedema \"gets me down\" (i.e., depression, frustration, or anger) 0  -KA      I must rely on others for help due to my lymphedema. 0  -KA      I know what to do to manage my lymphedema 0  -KA              Functional Concerns    Lymphedema affects my ability to perform self-care activities (i.e. eating, dressing, hygiene) 0  -KA      Lymphedema affects my ability to perform routine home or work-related activities. 1  -KA      Lymphedema affects my performance of preferred leisure activities. 1  -KA      Lymphedema affects proper fit of clothing/shoes 2  -KA      Lymphedema affects my sleep 1  -KA              Posture/Observations    Posture/Observations Comments Band-aid on left knee  -KA              Lymphedema Edema Assessment    Ptting Edema Category By grade out of 4  -KA      Pitting Edema + 2/4  -KA      Stemmer Sign positive; bilateral:  -KA      Edema Assessment Comment RLE mild edema, LLE moderate edema to level of knee  -KA              Skin Changes/Observations    Skin Observations Comment Skin intact, no rashes or skin irritation noted, normal coloration except usual brown areas around toes  -KA              Lymphedema Measurements    Measurement Type(s) Circumferential  -KA      Circumferential Areas Lower extremities  -KA              BLE Circumferential (cm)    Measurement Location 1 Knee  -KA      Left 1 42.5 cm  -KA      Right 1 39 cm  -KA      Measurement Location 2 " 10cm below knee  -KA      Left 2 39.1 cm  -KA      Right 2 38.9 cm  -KA      Measurement Location 3 20cm below knee  -KA      Left 3 43.3 cm  -KA      Right 3 38.2 cm  -KA      Measurement Location 4 30cm below knee  -KA      Left 4 37 cm  -KA      Right 4 32.5 cm  -KA      Measurement Location 5 Ankle  -KA      Left 5 30 cm  -KA      Right 5 28 cm  -KA      Measurement Location 6 Midfoot  -KA      Left 6 25.1 cm  -KA      Right 6 25.7 cm  -KA      LLE Circumferential Total 217 cm  -KA      RLE Circumferential Total 202.3 cm  -KA              Manual Lymphatic Drainage    Manual Lymphatic Drainage Comments Pt instructed in self MLD today  -KA              Compression/Skin Care    Compression/Skin Care skin care; wrapping location; bandaging  -KA      Skin Care moisturizing lotion applied  -KA      Wrapping Location lower extremity  -KA      Wrapping Location LE bilateral:; foot to knee  -KA      Bandage Layers cotton liner; padding/fluff layer; short-stretch bandages (comment size/quantity)  -KA      Bandaging Comments TG9, Artiflex x 3, Rosidal K 1-8 cm, 3-10 cm to each leg  -KA      Bandaging Technique circumferential/spiral; moderate compression  -KA              Lymphedema Life Impact Scale Totals    A.  Total Q1 - Q17 (Do not include Q18) 20  -KA      B.  Total number of questions answered (Q1-Q17) 17  -KA      C. Divide A by B 1.18  -KA      D. Multiple C by 25 29.5  -KA            User Key  (r) = Recorded By, (t) = Taken By, (c) = Cosigned By    Initials Name Provider Type    Rubina Amaya, PT Physical Therapist                               PT Assessment/Plan     Row Name 01/14/22 1236 01/14/22 1233       PT Assessment    Functional Limitations -- Impaired gait; Limitation in home management; Limitations in community activities; Performance in leisure activities; Performance in self-care ADL  -    Impairments -- Edema; Gait; Impaired lymphatic circulation  -    Assessment Comments -- Pt has made  excellent progress with complete decongestive therapy; upon formal reassessment, he demonstrates net decreased edema of 21.7 cm on LLE and 19.2 cm on RLE since starting treatment.  He has met 3/3 short term goals, and 1/3 long term goal and is making steady progress towards remaining 2 long term goals.  Today he was instructed in self-MLD techniques to add to HEP, able to perform correctly in clinic with mod VCs, written diagram provided.  He was also provided with recommendations for Juxtalite HD velcro compression garments so that he can order them.  His LLIS score improved to 29.5, demonstrating decreased disability related to lymphedema.  Pt will benefit from continued skilled physical therapy to further reduce edema especially in LLE and to instruct in proper application of long term compression devices.  He remains appropriate for skilled physical therapy at this time.  -KA    Rehab Potential Good  -KA --    Patient/caregiver participated in establishment of treatment plan and goals Yes  -KA --    Patient would benefit from skilled therapy intervention Yes  -KA --       PT Plan    PT Frequency 5x/week  -KA --    Predicted Duration of Therapy Intervention (PT) 1-2 more weeks; will need to continue therapy until he has long term compression devices to replace bandaging.  -KA --    Planned CPT's? PT RE-EVAL: 50393; PT THER PROC EA 15 MIN: 54615; PT THER ACT EA 15 MIN: 30193; PT MANUAL THERAPY EA 15 MIN: 80617; PT NEUROMUSC RE-EDUCATION EA 15 MIN: 69005; PT GAIT TRAINING EA 15 MIN: 28419; PT SELF CARE/HOME MGMT/TRAIN EA 15: 57355  -KA --    Physical Therapy Interventions (Optional Details) bandaging; home exercise program; manual lymphatic drainage; manual therapy techniques; patient/family education  -KA --    PT Plan Comments Follow up regarding ordering of velcro devices, resume MLD to LLE, continue with compression bandaging.  -KA --          User Key  (r) = Recorded By, (t) = Taken By, (c) = Cosigned By     "Initials Name Provider Type    Rubina Amaya, PT Physical Therapist                    OP Exercises     Row Name 01/14/22 1240 01/14/22 0800          Subjective Comments    Subjective Comments -- Reports \"I think my right leg is as small as it's going to get.  The left leg still probably has some room to decrease.\"  -KA            Subjective Pain    Able to rate subjective pain? -- yes  -KA     Pre-Treatment Pain Level -- 0  -KA     Post-Treatment Pain Level -- 0  -KA            Total Minutes    04439 - PT Manual Therapy Minutes 42  -KA --           User Key  (r) = Recorded By, (t) = Taken By, (c) = Cosigned By    Initials Name Provider Type    Rubina Amaya, PT Physical Therapist                        Manual Rx (last 36 hours)     Manual Treatments     Row Name 01/14/22 1240             Total Minutes    75887 - PT Manual Therapy Minutes 42  -KA            User Key  (r) = Recorded By, (t) = Taken By, (c) = Cosigned By    Initials Name Provider Type    Rubina Amaya, PT Physical Therapist                 PT OP Goals     Row Name 01/14/22 1200          PT Short Term Goals    STG Date to Achieve 11/18/21  -KA     STG 1 Pt demo awareness of condition and precautions for improved prevention, management, care of symptoms, and ease of transition to self-care of condition.  -KA     STG 1 Progress Met  -KA     STG 2 Pt demo decreased net edema of >/=5-10cm for decreased edema symptoms, decreased risk of infection, and improved skin care.  -KA     STG 2 Progress Met  -KA     STG 3 Patient independent and compliant with home exercise program (as able) focused on range of motion and flexibility to improve lymphatic flow and discomfort.  -KA     STG 3 Progress Met  -KA            Long Term Goals    LTG Date to Achieve 03/22/21  -KA     LTG 1 Pt/family independent with self care techniques for self-management of condition.  -KA     LTG 1 Progress Partially Met  -KA     LTG 1 Progress Comments Pt is compliant " with decongestive exercise, knows how to bandage and importance of consistent compression, provided with training for self-MLD today.  -     LTG 2 Pt demo decreased net edema of >/=10-20cm for decreased edema symptoms, decreased risk of infection, and improved skin care.  -KA     LTG 2 Progress Met  -KA     LTG 2 Progress Comments Net decreased edema 21.7 cm on LLE, 19.2 cm on RLE  -     LTG 3 Pt/family independent with compression garments as indicated for self-management of condition.  -KA     LTG 3 Progress Progressing  -KA     LTG 3 Progress Comments Pt provided with information to order long term compression garments this visit, will need to assess fit and train for use when he receives garments.  -DEVONTE           User Key  (r) = Recorded By, (t) = Taken By, (c) = Cosigned By    Initials Name Provider Type    Rubina Amaya, PT Physical Therapist                Therapy Education  Education Details: Discussed response to Rosidal soft- pt did not like and did not feel like it helped.  Discussed measurements, provided with recommendation for long term garments based on measurements- Juxtalite HD Large Long for BLE.  Also instructed in self-MLD for HEP and provided with written handout.  Given: HEP, Edema management, Symptoms/condition management  Program: New, Reinforced  How Provided: Verbal, Demonstration, Written  Provided to: Patient  Level of Understanding: Verbalized, Demonstrated  50109 - PT Self Care/Mgmt Minutes: 15              Time Calculation:   Start Time: 0820  Stop Time: 0917  Time Calculation (min): 57 min  Total Timed Code Minutes- PT: 57 minute(s)  Timed Charges  02853 - PT Manual Therapy Minutes: 42  20925 - PT Self Care/Mgmt Minutes: 15  Total Minutes  Timed Charges Total Minutes: 57   Total Minutes: 57   Therapy Charges for Today     Code Description Service Date Service Provider Modifiers Qty    81932951478 HC PT MANUAL THERAPY EA 15 MIN 1/14/2022 Rubina Powers, PT GP 3     [id number]  PT SELF CARE/MGMT/TRAIN EA 15 MIN 1/14/2022 Rubina Powers, PT GP 1                    Rubina Powers, PT  1/14/2022

## 2022-01-17 ENCOUNTER — HOSPITAL ENCOUNTER (OUTPATIENT)
Dept: PHYSICAL THERAPY | Facility: HOSPITAL | Age: 72
Setting detail: THERAPIES SERIES
Discharge: HOME | End: 2022-01-17

## 2022-01-17 DIAGNOSIS — I89.0 LYMPHEDEMA: Primary | ICD-10-CM

## 2022-01-17 PROCEDURE — 97140 MANUAL THERAPY 1/> REGIONS: CPT

## 2022-01-17 NOTE — THERAPY TREATMENT NOTE
"    Outpatient Physical Therapy Lymphedema Treatment Note  [facility]     Patient Name: Jose Hurtado  : 1950  MRN: [id number]  Today's Date: 2022        Visit Date: 2022    Visit Dx:    ICD-10-CM ICD-9-CM   1. Lymphedema  I89.0 457.1       Patient Active Problem List   Diagnosis   • Adenocarcinoma of esophagus (HCC)   • Adenomatous polyp of colon   • Malignant neoplasm of prostate (HCC)   • RLS (restless legs syndrome)   • History of DVT (deep vein thrombosis)   • Recurrent major depressive disorder, in partial remission (HCC)   • Hypertension   • Anemia   • Insomnia due to other mental disorder   • Peripheral polyneuropathy   • B12 deficiency   • Postsurgical malabsorption   • Lymphedema   • Iron deficiency   • Gastroparesis   • Acute deep vein thrombosis (DVT) of popliteal vein of left lower extremity (HCC)   • Tremor of both hands   • Gastrointestinal hemorrhage   • Acute blood loss anemia   • Pancytopenia (HCC)   • Chronic venous hypertension due to DVT   • COPD (chronic obstructive pulmonary disease) (HCC)   • Bleeding hemorrhoid   • Malabsorption of iron   • Iron deficiency anemia   • Pleuritic chest pain   • History of esophageal cancer   • Abnormal CT scan   • Generalized weakness   • Chronic anticoagulation   • Urinary tract infection due to extended-spectrum beta lactamase (ESBL) producing Escherichia coli   • CKD (chronic kidney disease) stage 2, GFR 60-89 ml/min   • Dysphagia         Lymphedema     Row Name 22 0800             Subjective Pain    Able to rate subjective pain? yes  -KA      Pre-Treatment Pain Level 0  -KA      Post-Treatment Pain Level 0  -KA              Subjective Comments    Subjective Comments Pt reports \"I ordered my velcro devices.  Can we review the self-massage today?\"  -KA              Skin Changes/Observations    Skin Observations Comment Skin intact, no redness, fine raised rash BLE below knees  -KA              Manual Lymphatic Drainage    Manual " "Lymphatic Drainage Comments Short neck, B axilla, B IA, L inguinal, LLE: reviewed self-MLD  -              Compression/Skin Care    Compression/Skin Care skin care; wrapping location; bandaging  -      Skin Care moisturizing lotion applied; washed/dried  -KA      Wrapping Location lower extremity  -KA      Wrapping Location LE bilateral:; foot to knee  -KA      Bandage Layers cotton liner; padding/fluff layer; short-stretch bandages (comment size/quantity)  -      Bandaging Comments TG9, Artiflex x 3, Rosidal K 1-8 cm, 3-10 cm to each leg  -      Bandaging Technique circumferential/spiral; moderate compression; strong compression  -            User Key  (r) = Recorded By, (t) = Taken By, (c) = Cosigned By    Initials Name Provider Type    Rubina Amaya, PT Physical Therapist                               PT Assessment/Plan     Row Name 01/17/22 0914          PT Assessment    Assessment Comments Resumed MLD to LLE and reviewed self-MLD techniques with patient.  Resumed use of hydrocortizone mixed with Eucerin due to reappearance of fine rash on legs, pt denies itching.  Pt continues to respond well to compression bandaging to BLE, will continue with CDT per PT POC.  Long term garments have been ordered for transition to phase 2.  -DEVONTE            PT Plan    Predicted Duration of Therapy Intervention (OT) Continue MLD to LLE, continue compression bandaging.  -           User Key  (r) = Recorded By, (t) = Taken By, (c) = Cosigned By    Initials Name Provider Type    Rubina Amaya, PT Physical Therapist                    OP Exercises     Row Name 01/17/22 0915 01/17/22 0800          Subjective Comments    Subjective Comments -- Pt reports \"I ordered my velcro devices.  Can we review the self-massage today?\"  -KA            Subjective Pain    Able to rate subjective pain? -- yes  -KA     Pre-Treatment Pain Level -- 0  -KA     Post-Treatment Pain Level -- 0  -KA            Total Minutes    16462 - " PT Manual Therapy Minutes 55  -KA --           User Key  (r) = Recorded By, (t) = Taken By, (c) = Cosigned By    Initials Name Provider Type    Rubina Amaya PT Physical Therapist                        Manual Rx (last 36 hours)     Manual Treatments     Row Name 01/17/22 0915             Total Minutes    40203 - PT Manual Therapy Minutes 55  -KA            User Key  (r) = Recorded By, (t) = Taken By, (c) = Cosigned By    Initials Name Provider Type    Rubina Amaya PT Physical Therapist                 PT OP Goals     Row Name 01/17/22 0917          Time Calculation    PT Goal Re-Cert Due Date 04/14/22  -KA           User Key  (r) = Recorded By, (t) = Taken By, (c) = Cosigned By    Initials Name Provider Type    Rubina Amaya PT Physical Therapist                Therapy Education  Education Details: Reviewed self-MLD with patient.  Given: HEP, Edema management  Program: Reinforced  How Provided: Verbal, Demonstration  Provided to: Patient  Level of Understanding: Verbalized              Time Calculation:   Start Time: 0820  Stop Time: 0915  Time Calculation (min): 55 min  Total Timed Code Minutes- PT: 55 minute(s)  Timed Charges  81222 - PT Manual Therapy Minutes: 55  Total Minutes  Timed Charges Total Minutes: 55   Total Minutes: 55   Therapy Charges for Today     Code Description Service Date Service Provider Modifiers Qty    91538913869 HC PT MANUAL THERAPY EA 15 MIN 1/17/2022 Rubina Powers, PT GP 4                    Rubina Powers, PT  1/17/2022

## 2022-01-18 ENCOUNTER — HOSPITAL ENCOUNTER (OUTPATIENT)
Dept: PHYSICAL THERAPY | Facility: HOSPITAL | Age: 72
Setting detail: THERAPIES SERIES
Discharge: HOME | End: 2022-01-18

## 2022-01-18 ENCOUNTER — TELEPHONE (OUTPATIENT)
Dept: INTERNAL MEDICINE | Age: 72
End: 2022-01-18

## 2022-01-18 DIAGNOSIS — I89.0 LYMPHEDEMA: Primary | ICD-10-CM

## 2022-01-18 PROCEDURE — 97140 MANUAL THERAPY 1/> REGIONS: CPT

## 2022-01-18 RX ORDER — APIXABAN 5 MG/1
TABLET, FILM COATED ORAL
Qty: 60 TABLET | Refills: 1 | Status: SHIPPED | OUTPATIENT
Start: 2022-01-18 | End: 2022-05-02

## 2022-01-18 NOTE — THERAPY TREATMENT NOTE
Outpatient Physical Therapy Lymphedema Treatment Note  [facility]     Patient Name: Jose Hurtado  : 1950  MRN: [id number]  Today's Date: 2022        Visit Date: 2022    Visit Dx:    ICD-10-CM ICD-9-CM   1. Lymphedema  I89.0 457.1       Patient Active Problem List   Diagnosis   • Adenocarcinoma of esophagus (HCC)   • Adenomatous polyp of colon   • Malignant neoplasm of prostate (HCC)   • RLS (restless legs syndrome)   • History of DVT (deep vein thrombosis)   • Recurrent major depressive disorder, in partial remission (HCC)   • Hypertension   • Anemia   • Insomnia due to other mental disorder   • Peripheral polyneuropathy   • B12 deficiency   • Postsurgical malabsorption   • Lymphedema   • Iron deficiency   • Gastroparesis   • Acute deep vein thrombosis (DVT) of popliteal vein of left lower extremity (HCC)   • Tremor of both hands   • Gastrointestinal hemorrhage   • Acute blood loss anemia   • Pancytopenia (HCC)   • Chronic venous hypertension due to DVT   • COPD (chronic obstructive pulmonary disease) (HCC)   • Bleeding hemorrhoid   • Malabsorption of iron   • Iron deficiency anemia   • Pleuritic chest pain   • History of esophageal cancer   • Abnormal CT scan   • Generalized weakness   • Chronic anticoagulation   • Urinary tract infection due to extended-spectrum beta lactamase (ESBL) producing Escherichia coli   • CKD (chronic kidney disease) stage 2, GFR 60-89 ml/min   • Dysphagia         Lymphedema     Row Name 22 1300             Subjective Pain    Able to rate subjective pain? yes  -KD      Pre-Treatment Pain Level 0  -KD              Subjective Comments    Subjective Comments Seeing his primary care doctor today after treatment here.  -KD              Manual Lymphatic Drainage    Manual Lymphatic Drainage Comments Short neck, B axilla, B IA,diaphragmatic breathing,  L inguinal, LLE  -KD              Compression/Skin Care    Compression/Skin Care skin care; wrapping location;  bandaging  -KD      Skin Care moisturizing lotion applied; washed/dried; topical anti-inflammatory applied  Eucerin lotion/ HCC  -KD      Wrapping Location lower extremity  -KD      Wrapping Location LE bilateral:; foot to knee  -KD      Bandage Layers cotton liner; padding/fluff layer; short-stretch bandages (comment size/quantity)  -KD      Bandaging Comments TG9, Artiflex x 3, Rosidal K 1-8 cm, 3-10 cm to each leg  -KD      Bandaging Technique circumferential/spiral; moderate compression; strong compression  -KD            User Key  (r) = Recorded By, (t) = Taken By, (c) = Cosigned By    Initials Name Provider Type    Ceci Esposito, PT Physical Therapist                               PT Assessment/Plan     Row Name 01/18/22 1313          PT Assessment    Assessment Comments Pt is making good progress in therapy-- edema is decreasing, skin generally looks good, long term compression devices have been ordered.  -KD            PT Plan    PT Plan Comments Cont therapy until new compression devices arrive, then fit with those devices.  -KD           User Key  (r) = Recorded By, (t) = Taken By, (c) = Cosigned By    Initials Name Provider Type    Ceci Esposito, PT Physical Therapist                    OP Exercises     Row Name 01/18/22 1316 01/18/22 1300          Subjective Comments    Subjective Comments -- Seeing his primary care doctor today after treatment here.  -KD            Subjective Pain    Able to rate subjective pain? -- yes  -KD     Pre-Treatment Pain Level -- 0  -KD            Total Minutes    19309 - PT Manual Therapy Minutes 55  -KD --           User Key  (r) = Recorded By, (t) = Taken By, (c) = Cosigned By    Initials Name Provider Type    Ceci Esposito, PT Physical Therapist                        Manual Rx (last 36 hours)     Manual Treatments     Row Name 01/18/22 1316             Total Minutes    78585 - PT Manual Therapy Minutes 55  -KD            User Key  (r) = Recorded By, (t) =  Taken By, (c) = Cosigned By    Initials Name Provider Type    KD Ceci Ruby, PT Physical Therapist                    Therapy Education  Given: Edema management  Program: Reinforced  How Provided: Verbal  Provided to: Patient  Level of Understanding: Verbalized              Time Calculation:   Start Time: 0815  Stop Time: 0910  Time Calculation (min): 55 min  Total Timed Code Minutes- PT: 55 minute(s)  Timed Charges  11979 - PT Manual Therapy Minutes: 55  Total Minutes  Timed Charges Total Minutes: 55   Total Minutes: 55   Therapy Charges for Today     Code Description Service Date Service Provider Modifiers Qty    [id number] HC PT MANUAL THERAPY EA 15 MIN 1/18/2022 Ceci Ruby, PT GP 4                    Ceci Ruby, PT  1/18/2022

## 2022-01-18 NOTE — TELEPHONE ENCOUNTER
PT HAS BEEN COUGHING SINCE 2015 AND ON OCCASION HE COUGHS UP PHLEGM. TODAY WHEN HE WAS COUGHING HE COUGHED UP WHAT LOOKED LIKE COFFEE GROUNDS.   THIS IS THE FIRST TIME IT HAS HAPPENED. PT IS NOT SURE IF HE SHOULD BE CONCERNED OR NOT. PLEASE CALL [phone]    PT IS COMING BACK IN AT 2:30 TODAY FOR AN APPT

## 2022-01-19 ENCOUNTER — HOSPITAL ENCOUNTER (OUTPATIENT)
Dept: PHYSICAL THERAPY | Facility: HOSPITAL | Age: 72
Setting detail: THERAPIES SERIES
Discharge: HOME | End: 2022-01-19

## 2022-01-19 DIAGNOSIS — I89.0 LYMPHEDEMA: Primary | ICD-10-CM

## 2022-01-19 PROCEDURE — 97140 MANUAL THERAPY 1/> REGIONS: CPT

## 2022-01-19 NOTE — THERAPY TREATMENT NOTE
Outpatient Physical Therapy Lymphedema Treatment Note  [facility]     Patient Name: Jose Hurtado  : 1950  MRN: [id number]  Today's Date: 2022        Visit Date: 2022    Visit Dx:    ICD-10-CM ICD-9-CM   1. Lymphedema  I89.0 457.1       Patient Active Problem List   Diagnosis   • Adenocarcinoma of esophagus (HCC)   • Adenomatous polyp of colon   • Malignant neoplasm of prostate (HCC)   • RLS (restless legs syndrome)   • History of DVT (deep vein thrombosis)   • Recurrent major depressive disorder, in partial remission (HCC)   • Hypertension   • Anemia   • Insomnia due to other mental disorder   • Peripheral polyneuropathy   • B12 deficiency   • Postsurgical malabsorption   • Lymphedema   • Iron deficiency   • Gastroparesis   • Acute deep vein thrombosis (DVT) of popliteal vein of left lower extremity (HCC)   • Tremor of both hands   • Gastrointestinal hemorrhage   • Acute blood loss anemia   • Pancytopenia (HCC)   • Chronic venous hypertension due to DVT   • COPD (chronic obstructive pulmonary disease) (HCC)   • Bleeding hemorrhoid   • Malabsorption of iron   • Iron deficiency anemia   • Pleuritic chest pain   • History of esophageal cancer   • Abnormal CT scan   • Generalized weakness   • Chronic anticoagulation   • Urinary tract infection due to extended-spectrum beta lactamase (ESBL) producing Escherichia coli   • CKD (chronic kidney disease) stage 2, GFR 60-89 ml/min   • Dysphagia         Lymphedema     Row Name 22 1000             Subjective Pain    Able to rate subjective pain? yes  -KA      Pre-Treatment Pain Level 0  -KA      Post-Treatment Pain Level 0  -KA              Subjective Comments    Subjective Comments Pt reports no pain or issues with bandaging, still waiting to receive compression devices for LT use.  -KA              Manual Lymphatic Drainage    Manual Lymphatic Drainage Comments Short neck, B axilla, B IA, Deep abdominal colon technique,  L inguinal, LLE  -KA               Compression/Skin Care    Compression/Skin Care skin care; wrapping location; bandaging  -KA      Skin Care moisturizing lotion applied; washed/dried  -KA      Wrapping Location lower extremity  -KA      Wrapping Location LE bilateral:; foot to knee  -KA      Bandage Layers cotton liner; padding/fluff layer; short-stretch bandages (comment size/quantity)  -KA      Bandaging Comments TG9, Artiflex x 3, Rosidal K 1-8 cm, 3-10 cm to each leg  -KA      Bandaging Technique circumferential/spiral; moderate compression; strong compression  -KA      Compression/Skin Care Comments HCC mixed with Eucerin  -KA            User Key  (r) = Recorded By, (t) = Taken By, (c) = Cosigned By    Initials Name Provider Type    Rubina Amaya, PT Physical Therapist                               PT Assessment/Plan     Row Name 01/19/22 1050          PT Assessment    Assessment Comments Pt continues to respond well to MLD and compression bandaging, continued HCC due to fine rash on legs (pt denies itching, no redness).  Waiting on velcro compression devices to transition to Phase 2 of CDT.  -DEVONTE            PT Plan    PT Plan Comments Continue CDT, transition to velcro compression devices when they arrive, assess fit.  -DEVONTE           User Key  (r) = Recorded By, (t) = Taken By, (c) = Cosigned By    Initials Name Provider Type    Rubina Amaya, PT Physical Therapist                    OP Exercises     Row Name 01/19/22 1052 01/19/22 1000          Subjective Comments    Subjective Comments -- Pt reports no pain or issues with bandaging, still waiting to receive compression devices for LT use.  -KA            Subjective Pain    Able to rate subjective pain? -- yes  -KA     Pre-Treatment Pain Level -- 0  -KA     Post-Treatment Pain Level -- 0  -KA            Total Minutes    14006 - PT Manual Therapy Minutes 53  -KA --           User Key  (r) = Recorded By, (t) = Taken By, (c) = Cosigned By    Initials Name Provider Type     Rubina Amaya, PT Physical Therapist                        Manual Rx (last 36 hours)     Manual Treatments     Row Name 01/19/22 1052             Total Minutes    50551 - PT Manual Therapy Minutes 53  -KA            User Key  (r) = Recorded By, (t) = Taken By, (c) = Cosigned By    Initials Name Provider Type    Rubina Amaya, MANDA Physical Therapist                    Therapy Education  Education Details: Discussed POC, pt to bring LT compression devices to clinic when he receives.  Given: Symptoms/condition management, Edema management  Program: Reinforced  How Provided: Verbal  Provided to: Patient  Level of Understanding: Verbalized              Time Calculation:   Start Time: 1020  Stop Time: 1113  Time Calculation (min): 53 min  Total Timed Code Minutes- PT: 53 minute(s)  Timed Charges  32465 - PT Manual Therapy Minutes: 53  Total Minutes  Timed Charges Total Minutes: 53   Total Minutes: 53   Therapy Charges for Today     Code Description Service Date Service Provider Modifiers Qty    08433430939 HC PT MANUAL THERAPY EA 15 MIN 1/19/2022 Rubina Powers, PT GP 4                    Rubina Powers PT  1/19/2022

## 2022-01-20 ENCOUNTER — HOSPITAL ENCOUNTER (OUTPATIENT)
Dept: PHYSICAL THERAPY | Facility: HOSPITAL | Age: 72
Setting detail: THERAPIES SERIES
Discharge: HOME | End: 2022-01-20

## 2022-01-20 DIAGNOSIS — I89.0 LYMPHEDEMA: Primary | ICD-10-CM

## 2022-01-20 PROCEDURE — 97140 MANUAL THERAPY 1/> REGIONS: CPT

## 2022-01-20 NOTE — THERAPY TREATMENT NOTE
"    Outpatient Physical Therapy Lymphedema Treatment Note  [facility]     Patient Name: Jose Hurtado  : 1950  MRN: [id number]  Today's Date: 2022        Visit Date: 2022    Visit Dx:    ICD-10-CM ICD-9-CM   1. Lymphedema  I89.0 457.1       Patient Active Problem List   Diagnosis   • Adenocarcinoma of esophagus (HCC)   • Adenomatous polyp of colon   • Malignant neoplasm of prostate (HCC)   • RLS (restless legs syndrome)   • History of DVT (deep vein thrombosis)   • Recurrent major depressive disorder, in partial remission (HCC)   • Hypertension   • Anemia   • Insomnia due to other mental disorder   • Peripheral polyneuropathy   • B12 deficiency   • Postsurgical malabsorption   • Lymphedema   • Iron deficiency   • Gastroparesis   • Acute deep vein thrombosis (DVT) of popliteal vein of left lower extremity (HCC)   • Tremor of both hands   • Gastrointestinal hemorrhage   • Acute blood loss anemia   • Pancytopenia (HCC)   • Chronic venous hypertension due to DVT   • COPD (chronic obstructive pulmonary disease) (HCC)   • Bleeding hemorrhoid   • Malabsorption of iron   • Iron deficiency anemia   • Pleuritic chest pain   • History of esophageal cancer   • Abnormal CT scan   • Generalized weakness   • Chronic anticoagulation   • Urinary tract infection due to extended-spectrum beta lactamase (ESBL) producing Escherichia coli   • CKD (chronic kidney disease) stage 2, GFR 60-89 ml/min   • Dysphagia         Lymphedema     Row Name 22 1000             Subjective Pain    Able to rate subjective pain? yes  -KD      Pre-Treatment Pain Level 0  -KD              Subjective Comments    Subjective Comments States he's pleased with how well his left leg is going down, \"it looks more like a leg now instead of a tree trunk\". States he did  not end up seeing MD on the 18 after all--doing better.  -KD              Manual Lymphatic Drainage    Manual Lymphatic Drainage Comments Short neck, B axilla, B IA, " "Diaphragmatic breathing,  L inguinal, LLE  -KD              Compression/Skin Care    Compression/Skin Care skin care; wrapping location; bandaging  -KD      Skin Care moisturizing lotion applied; washed/dried  Eucerin/HCC  -KD      Wrapping Location lower extremity  -KD      Wrapping Location LE bilateral:; foot to knee  -KD      Bandage Layers cotton liner; padding/fluff layer; short-stretch bandages (comment size/quantity)  -KD      Bandaging Comments TG9, Artiflex x 3, Rosidal K 1-8 cm, 3-10 cm to each leg  -KD      Bandaging Technique circumferential/spiral; moderate compression; strong compression  -KD      Compression/Skin Care Comments HCC mixed with Eucerin  -KD            User Key  (r) = Recorded By, (t) = Taken By, (c) = Cosigned By    Initials Name Provider Type    Ceci Esposito, PT Physical Therapist                               PT Assessment/Plan     Row Name 01/20/22 1035          PT Assessment    Assessment Comments Pt is doing very well with therapy, making good progress.  -KD            PT Plan    PT Plan Comments Cont therapy, fit with compression once it arrives.  -KD           User Key  (r) = Recorded By, (t) = Taken By, (c) = Cosigned By    Initials Name Provider Type    Ceci Esposito, PT Physical Therapist                    OP Exercises     Row Name 01/20/22 1039 01/20/22 1000          Subjective Comments    Subjective Comments -- States he's pleased with how well his left leg is going down, \"it looks more like a leg now instead of a tree trunk\". States he did  not end up seeing MD on the 18th after all--doing better.  -KD            Subjective Pain    Able to rate subjective pain? -- yes  -KD     Pre-Treatment Pain Level -- 0  -KD            Total Minutes    87639 - PT Manual Therapy Minutes 55  -KD --           User Key  (r) = Recorded By, (t) = Taken By, (c) = Cosigned By    Initials Name Provider Type    Ceci Esposito, PT Physical Therapist                        Manual Rx " (last 36 hours)     Manual Treatments     Row Name 01/20/22 1039             Total Minutes    57162 - PT Manual Therapy Minutes 55  -KD            User Key  (r) = Recorded By, (t) = Taken By, (c) = Cosigned By    Initials Name Provider Type    Ceci Esposito, PT Physical Therapist                    Therapy Education  Education Details: Discussed plan for discharge once compression devices arrive.  Given: Edema management  Program: New, Reinforced  How Provided: Verbal  Provided to: Patient  Level of Understanding: Verbalized              Time Calculation:   Start Time: 0820  Stop Time: 0915  Time Calculation (min): 55 min  Total Timed Code Minutes- PT: 55 minute(s)  Timed Charges  97425 - PT Manual Therapy Minutes: 55  Total Minutes  Timed Charges Total Minutes: 55   Total Minutes: 55   Therapy Charges for Today     Code Description Service Date Service Provider Modifiers Qty    45882447710 HC PT MANUAL THERAPY EA 15 MIN 1/20/2022 Ceci Ruby, PT GP 4                    Ceci Ruby, PT  1/20/2022

## 2022-01-21 ENCOUNTER — HOSPITAL ENCOUNTER (OUTPATIENT)
Dept: PHYSICAL THERAPY | Facility: HOSPITAL | Age: 72
Setting detail: THERAPIES SERIES
Discharge: HOME | End: 2022-01-21

## 2022-01-21 DIAGNOSIS — I89.0 LYMPHEDEMA: Primary | ICD-10-CM

## 2022-01-21 PROCEDURE — 97140 MANUAL THERAPY 1/> REGIONS: CPT

## 2022-01-21 NOTE — THERAPY TREATMENT NOTE
"    Outpatient Physical Therapy Lymphedema Treatment Note  [facility]     Patient Name: Jose Hurtado  : 1950  MRN: [id number]  Today's Date: 2022        Visit Date: 2022    Visit Dx:    ICD-10-CM ICD-9-CM   1. Lymphedema  I89.0 457.1       Patient Active Problem List   Diagnosis   • Adenocarcinoma of esophagus (HCC)   • Adenomatous polyp of colon   • Malignant neoplasm of prostate (HCC)   • RLS (restless legs syndrome)   • History of DVT (deep vein thrombosis)   • Recurrent major depressive disorder, in partial remission (HCC)   • Hypertension   • Anemia   • Insomnia due to other mental disorder   • Peripheral polyneuropathy   • B12 deficiency   • Postsurgical malabsorption   • Lymphedema   • Iron deficiency   • Gastroparesis   • Acute deep vein thrombosis (DVT) of popliteal vein of left lower extremity (HCC)   • Tremor of both hands   • Gastrointestinal hemorrhage   • Acute blood loss anemia   • Pancytopenia (HCC)   • Chronic venous hypertension due to DVT   • COPD (chronic obstructive pulmonary disease) (HCC)   • Bleeding hemorrhoid   • Malabsorption of iron   • Iron deficiency anemia   • Pleuritic chest pain   • History of esophageal cancer   • Abnormal CT scan   • Generalized weakness   • Chronic anticoagulation   • Urinary tract infection due to extended-spectrum beta lactamase (ESBL) producing Escherichia coli   • CKD (chronic kidney disease) stage 2, GFR 60-89 ml/min   • Dysphagia         Lymphedema     Row Name 22 0800             Subjective Pain    Able to rate subjective pain? yes  -KA      Pre-Treatment Pain Level 0  -KA      Post-Treatment Pain Level 0  -KA              Subjective Comments    Subjective Comments Pt reports \"My legs seem to be smaller.\"  -KA              Lymphedema Measurements    Measurement Type(s) Circumferential  -KA      Circumferential Areas Lower extremities  -KA              BLE Circumferential (cm)    Measurement Location 1 Knee  -KA      Left 1 43.7 " cm  -KA      Right 1 39.9 cm  -KA      Measurement Location 2 10cm below knee  -KA      Left 2 43.2 cm  -KA      Right 2 40.6 cm  -KA      Measurement Location 3 20cm below knee  -KA      Left 3 41.5 cm  -KA      Right 3 35.9 cm  -KA      Measurement Location 4 30cm below knee  -KA      Left 4 34.7 cm  -KA      Right 4 28.5 cm  -KA      Measurement Location 5 Ankle  -KA      Left 5 29.5 cm  -KA      Right 5 28 cm  -KA      Measurement Location 6 Midfoot  -KA      Left 6 25.3 cm  -KA      Right 6 25 cm  -KA      LLE Circumferential Total 217.9 cm  -KA      RLE Circumferential Total 197.9 cm  -KA              Manual Lymphatic Drainage    Manual Lymphatic Drainage Comments Short neck, B axilla, B IA, Deep abdominal colon technique,  L inguinal, LLE  -KA              Compression/Skin Care    Compression/Skin Care skin care; wrapping location; bandaging  -KA      Skin Care washed/dried; moisturizing lotion applied  -KA      Wrapping Location lower extremity  -KA      Wrapping Location LE bilateral:; foot to knee  -KA      Bandage Layers cotton liner; padding/fluff layer; short-stretch bandages (comment size/quantity)  -KA      Bandaging Comments TG9, Artiflex x 3, Rosidal K 1-8 cm, 3-10 cm to each leg  -KA      Bandaging Technique circumferential/spiral; moderate compression; strong compression  -KA      Compression/Skin Care Comments HCC mixed with Eucerin  -KA            User Key  (r) = Recorded By, (t) = Taken By, (c) = Cosigned By    Initials Name Provider Type    Rubina Amaya, PT Physical Therapist                               PT Assessment/Plan     Row Name 01/21/22 0917 01/21/22 0915       PT Assessment    Assessment Comments -- Pt continues to respond well to CDT, reassessed girth measurements this visit with net decreased edema of 20.8 cm LLE and 23.6 cm RLE since starting therapy.  Continued with MLD to LLE and compression bandaging.  Still awaiting compression garments to transition to Phase 2 of CDT.  " -KA       PT Plan    PT Plan Comments Continue MLD to LLE, transition to long term devices when they arrive.  -KA --    Predicted Duration of Therapy Intervention (OT) -- Continue MLD to LLE, transition to long term devices when they arrive.  -KA          User Key  (r) = Recorded By, (t) = Taken By, (c) = Cosigned By    Initials Name Provider Type    Rubina Amaya, PT Physical Therapist                    OP Exercises     Row Name 01/21/22 0918 01/21/22 0800          Subjective Comments    Subjective Comments -- Pt reports \"My legs seem to be smaller.\"  -KA            Subjective Pain    Able to rate subjective pain? -- yes  -KA     Pre-Treatment Pain Level -- 0  -KA     Post-Treatment Pain Level -- 0  -KA            Total Minutes    01221 - PT Manual Therapy Minutes 56  -KA --           User Key  (r) = Recorded By, (t) = Taken By, (c) = Cosigned By    Initials Name Provider Type    Rubina Amyaa, PT Physical Therapist                        Manual Rx (last 36 hours)     Manual Treatments     Row Name 01/21/22 0918             Total Minutes    79325 - PT Manual Therapy Minutes 56  -KA            User Key  (r) = Recorded By, (t) = Taken By, (c) = Cosigned By    Initials Name Provider Type    Rubina Amaya, PT Physical Therapist                 PT OP Goals     Row Name 01/21/22 0900          PT Short Term Goals    STG Date to Achieve 11/18/21  -KA     STG 1 Pt demo awareness of condition and precautions for improved prevention, management, care of symptoms, and ease of transition to self-care of condition.  -KA     STG 1 Progress Met  -KA     STG 2 Pt demo decreased net edema of >/=5-10cm for decreased edema symptoms, decreased risk of infection, and improved skin care.  -KA     STG 2 Progress Met  -KA     STG 3 Patient independent and compliant with home exercise program (as able) focused on range of motion and flexibility to improve lymphatic flow and discomfort.  -KA     STG 3 Progress Met  -KA "            Long Term Goals    LTG Date to Achieve 03/22/21  -     LTG 1 Pt/family independent with self care techniques for self-management of condition.  -     LTG 1 Progress Met  -     LTG 2 Pt demo decreased net edema of >/=10-20cm for decreased edema symptoms, decreased risk of infection, and improved skin care.  -     LTG 2 Progress Met  -     LTG 2 Progress Comments Net decreased edema 20.8 cm LLE, 23.6 cm RLE  -     LTG 3 Pt/family independent with compression garments as indicated for self-management of condition.  -KA     LTG 3 Progress Progressing  -     LTG 3 Progress Comments Garments have been ordered, awaiting arrival  -            Time Calculation    PT Goal Re-Cert Due Date 04/14/22  -           User Key  (r) = Recorded By, (t) = Taken By, (c) = Cosigned By    Initials Name Provider Type    Rubina Amaya, PT Physical Therapist                Therapy Education  Education Details: Discussed girth measurements.  Given: Edema management  Program: Reinforced  How Provided: Verbal  Provided to: Patient  Level of Understanding: Verbalized              Time Calculation:   Start Time: 0817  Stop Time: 0913  Time Calculation (min): 56 min  Total Timed Code Minutes- PT: 56 minute(s)  Timed Charges  57706 - PT Manual Therapy Minutes: 56  Total Minutes  Timed Charges Total Minutes: 56   Total Minutes: 56   Therapy Charges for Today     Code Description Service Date Service Provider Modifiers Qty    02480473475 HC PT MANUAL THERAPY EA 15 MIN 1/21/2022 Rubina Powers, PT GP 4                    Rubina Powers, PT  1/21/2022

## 2022-01-24 ENCOUNTER — HOSPITAL ENCOUNTER (OUTPATIENT)
Dept: PHYSICAL THERAPY | Facility: HOSPITAL | Age: 72
Setting detail: THERAPIES SERIES
Discharge: HOME | End: 2022-01-24

## 2022-01-24 DIAGNOSIS — I89.0 LYMPHEDEMA: Primary | ICD-10-CM

## 2022-01-24 PROCEDURE — 97535 SELF CARE MNGMENT TRAINING: CPT

## 2022-01-24 NOTE — THERAPY DISCHARGE NOTE
"     Outpatient Physical Therapy Lymphedema Progress Note/Discharge Summary  [facility]     Patient Name: Jose Hurtado  : 1950  MRN: [id number]  Today's Date: 2022      Visit Date: 2022    Visit Dx:    ICD-10-CM ICD-9-CM   1. Lymphedema  I89.0 457.1       Patient Active Problem List   Diagnosis   • Adenocarcinoma of esophagus (HCC)   • Adenomatous polyp of colon   • Malignant neoplasm of prostate (HCC)   • RLS (restless legs syndrome)   • History of DVT (deep vein thrombosis)   • Recurrent major depressive disorder, in partial remission (HCC)   • Hypertension   • Anemia   • Insomnia due to other mental disorder   • Peripheral polyneuropathy   • B12 deficiency   • Postsurgical malabsorption   • Lymphedema   • Iron deficiency   • Gastroparesis   • Acute deep vein thrombosis (DVT) of popliteal vein of left lower extremity (HCC)   • Tremor of both hands   • Gastrointestinal hemorrhage   • Acute blood loss anemia   • Pancytopenia (HCC)   • Chronic venous hypertension due to DVT   • COPD (chronic obstructive pulmonary disease) (HCC)   • Bleeding hemorrhoid   • Malabsorption of iron   • Iron deficiency anemia   • Pleuritic chest pain   • History of esophageal cancer   • Abnormal CT scan   • Generalized weakness   • Chronic anticoagulation   • Urinary tract infection due to extended-spectrum beta lactamase (ESBL) producing Escherichia coli   • CKD (chronic kidney disease) stage 2, GFR 60-89 ml/min   • Dysphagia         Lymphedema     Row Name 22 0900             Subjective Pain    Able to rate subjective pain? yes  -KA      Pre-Treatment Pain Level 0  -KA      Post-Treatment Pain Level 0  -KA              Subjective Comments    Subjective Comments Reports \"My velcro device came in.  I brought it and the older Juzo device that I already had but never wore.  Can you also give me information for stockings in case these don't fit under my Red's?\"  -KA              Physical Concerns    The amount of " "pain associated with my lymphedema is: 1  -KA      The amount of limb heaviness associated with my lymphedema is: 2  -KA      The amount of skin tightness associated with my lymphedema is: 3  -KA      The size of my swollen limb(s) seems: 1  -KA      Lymphedema affects the movement of my swollen limb(s): 2  -KA      The strength in my swollen limb(s) is: 2  -KA              Psychosocial Concerns    Lymphedema affects my body image (i.e., \"how I think I look\"). 2  -KA      Lymphedema affects my socializing with others. 1  -KA      Lymphedema affects my intimate relations with spouse or partner (rate 0 if not applicable 0  -KA      Lymphedema \"gets me down\" (i.e., depression, frustration, or anger) 0  -KA      I must rely on others for help due to my lymphedema. 0  -KA      I know what to do to manage my lymphedema 0  -KA              Functional Concerns    Lymphedema affects my ability to perform self-care activities (i.e. eating, dressing, hygiene) 0  -KA      Lymphedema affects my ability to perform routine home or work-related activities. 1  -KA      Lymphedema affects my performance of preferred leisure activities. 1  -KA      Lymphedema affects proper fit of clothing/shoes 2  -KA      Lymphedema affects my sleep 1  -KA              Skin Changes/Observations    Skin Observations Comment Skin intact, normal coloration  -KA              Manual Lymphatic Drainage    Manual Lymphatic Drainage Comments No MLD today  -KA              Compression/Skin Care    Compression/Skin Care compression garment  -KA      Compression Garment Comments Pt instructed in application of removal of both Juzo Velcro compression for RLE and Medi Juxtalite HD for LLE  -KA              Lymphedema Life Impact Scale Totals    A.  Total Q1 - Q17 (Do not include Q18) 19  -KA      B.  Total number of questions answered (Q1-Q17) 17  -KA      C. Divide A by B 1.12  -KA      D. Multiple C by 25 28  -KA            User Key  (r) = Recorded By, (t) = " Taken By, (c) = Cosigned By    Initials Name Provider Type    Rubina Amaya, PT Physical Therapist                               PT Assessment/Plan     Row Name 01/24/22 0917          PT Assessment    Functional Limitations Impaired gait; Limitation in home management; Limitations in community activities; Performance in leisure activities; Performance in self-care ADL  -KA     Impairments Edema; Gait; Impaired lymphatic circulation  -     Assessment Comments Pt has met all functional goals.  Received ordered compression garments (Juxtalite HD), fits well.  Demonstrated application and provided cues for correct positioning, pt able to apply independently on second rep with appropriate compression applied.  Pt only ordered one velcro device initially, advised to wear new velcro device on LLE due to measurable compression and wear Juzo velcro device on RLE.  Advised that he has better edema control using Juxtalite HD or better fit with clothing to order 2nd garment for RLE.  Pt also given information for Mediven Dual Layer system including sizing due to concerns about wearing tighter fitting jeans with velcro, recommended size XL based on girth measurements.  He has seen net decreased edema of 20.8 cm LLE and 23.6 cm RLE since starting therapy and LLIS score has decreased to 28 indicating reduced disability related to lymphedema.  Pt to discharge at this time to independent HEP and self-management of condition.  -            PT Plan    PT Plan Comments Discharge to HEP and independent self-management of condition.  May need to order Juxtalite HD for RLE or Mediven Dual Layer System Size XL 30-40 mmHG to be able to wear tighter fitting clothing.  Will call to let patient know where he can order additional compressive undersocks for velcro devices.  -           User Key  (r) = Recorded By, (t) = Taken By, (c) = Cosigned By    Initials Name Provider Type    Rubina Amaya, PT Physical Therapist           "            OP Exercises     Row Name 01/24/22 0900             Subjective Comments    Subjective Comments Reports \"My velcro device came in.  I brought it and the older Juzo device that I already had but never wore.  Can you also give me information for stockings in case these don't fit under my Red's?\"  -KA              Subjective Pain    Able to rate subjective pain? yes  -KA      Pre-Treatment Pain Level 0  -KA      Post-Treatment Pain Level 0  -KA            User Key  (r) = Recorded By, (t) = Taken By, (c) = Cosigned By    Initials Name Provider Type    Rubina Amaya, PT Physical Therapist                               PT OP Goals     Row Name 01/24/22 0900          PT Short Term Goals    STG Date to Achieve 11/18/21  -     STG 1 Pt demo awareness of condition and precautions for improved prevention, management, care of symptoms, and ease of transition to self-care of condition.  -KA     STG 1 Progress Met  -     STG 2 Pt demo decreased net edema of >/=5-10cm for decreased edema symptoms, decreased risk of infection, and improved skin care.  -     STG 2 Progress Met  -     STG 3 Patient independent and compliant with home exercise program (as able) focused on range of motion and flexibility to improve lymphatic flow and discomfort.  -     STG 3 Progress Met  -KA            Long Term Goals    LTG Date to Achieve 03/22/21  -KA     LTG 1 Pt/family independent with self care techniques for self-management of condition.  -KA     LTG 1 Progress Met  -KA     LTG 2 Pt demo decreased net edema of >/=10-20cm for decreased edema symptoms, decreased risk of infection, and improved skin care.  -     LTG 2 Progress Met  -     LTG 3 Pt/family independent with compression garments as indicated for self-management of condition.  -     LTG 3 Progress Met  -KA            Time Calculation    PT Goal Re-Cert Due Date 04/14/22  -KA           User Key  (r) = Recorded By, (t) = Taken By, (c) = Cosigned By    " Initials Name Provider Type    KA Rubina Powers, PT Physical Therapist                Therapy Education  Education Details: Pt instructed in donning/doffing of both Juzo velcro compression device (RLE) and Circaid Juxtalite HD compression device (LLE), initially needs demonstration and mod VCs, but able to perform independently on 2nd attempt.  He was educated regarding replacement schedule as well as need to replace old Juzo compressive liners.  Provided with sizing information for dual layer system as alternative to velcro compression and reviewed HEP verbally including self-MLD and decongestive exercise.  Given: Edema management, HEP  Program: Reinforced, New  How Provided: Verbal, Demonstration, Written  Provided to: Patient  Level of Understanding: Teach back education performed, Verbalized, Demonstrated  31678 - PT Self Care/Mgmt Minutes: 55              Time Calculation:   Start Time: 0817  Stop Time: 0912  Time Calculation (min): 55 min  Total Timed Code Minutes- PT: 55 minute(s)  Timed Charges  16525 - PT Self Care/Mgmt Minutes: 55  Total Minutes  Timed Charges Total Minutes: 55   Total Minutes: 55     Therapy Charges for Today     Code Description Service Date Service Provider Modifiers Qty    [id number]  PT SELF CARE/MGMT/TRAIN EA 15 MIN 1/24/2022 Rubina Powers, PT GP, KX 4                 OP PT Discharge Summary  Date of Discharge: 01/24/22  Reason for Discharge: All goals achieved, other (comment) (Independent with HEP, able to don compression garments (velcro) independently)  Outcomes Achieved: Able to achieve all goals within established timeline  Discharge Destination: Home with home program  Discharge Instructions/Additional Comments: Discharge to HEP and independent self-management of condition. May need to order Juxtalite HD for RLE or Mediven Dual Layer System Size XL 30-40 mmHG to be able to wear tighter fitting clothing. Will call to let patient know where he can order additional  compressive undersocks for velcro devices.      Rubina Powers, PT  1/24/2022

## 2022-01-25 ENCOUNTER — APPOINTMENT (OUTPATIENT)
Dept: PHYSICAL THERAPY | Facility: HOSPITAL | Age: 72
End: 2022-01-25

## 2022-01-26 ENCOUNTER — APPOINTMENT (OUTPATIENT)
Dept: PHYSICAL THERAPY | Facility: HOSPITAL | Age: 72
End: 2022-01-26

## 2022-01-27 ENCOUNTER — APPOINTMENT (OUTPATIENT)
Dept: PHYSICAL THERAPY | Facility: HOSPITAL | Age: 72
End: 2022-01-27

## 2022-01-31 ENCOUNTER — CLINICAL SUPPORT (OUTPATIENT)
Dept: INTERNAL MEDICINE | Age: 72
End: 2022-01-31

## 2022-01-31 DIAGNOSIS — E53.8 B12 DEFICIENCY: Primary | ICD-10-CM

## 2022-01-31 PROCEDURE — 96372 THER/PROPH/DIAG INJ SC/IM: CPT | Performed by: INTERNAL MEDICINE

## 2022-01-31 RX ADMIN — CYANOCOBALAMIN 1000 MCG: 1000 INJECTION, SOLUTION INTRAMUSCULAR; SUBCUTANEOUS at 11:04

## 2022-02-22 DIAGNOSIS — F32.9 MAJOR DEPRESSIVE DISORDER WITH CURRENT ACTIVE EPISODE, UNSPECIFIED DEPRESSION EPISODE SEVERITY, UNSPECIFIED WHETHER RECURRENT: ICD-10-CM

## 2022-02-22 RX ORDER — PAROXETINE HYDROCHLORIDE 40 MG/1
TABLET, FILM COATED ORAL
Qty: 30 TABLET | Refills: 5 | Status: SHIPPED | OUTPATIENT
Start: 2022-02-22 | End: 2022-10-06

## 2022-03-03 ENCOUNTER — CLINICAL SUPPORT (OUTPATIENT)
Dept: INTERNAL MEDICINE | Age: 72
End: 2022-03-03

## 2022-03-03 DIAGNOSIS — E53.8 B12 DEFICIENCY: Primary | ICD-10-CM

## 2022-03-03 PROCEDURE — 96372 THER/PROPH/DIAG INJ SC/IM: CPT | Performed by: INTERNAL MEDICINE

## 2022-03-03 RX ADMIN — CYANOCOBALAMIN 1000 MCG: 1000 INJECTION, SOLUTION INTRAMUSCULAR; SUBCUTANEOUS at 13:15

## 2022-03-04 RX ORDER — SYRINGE W-NEEDLE,DISPOSAB,3 ML 25GX5/8"
SYRINGE, EMPTY DISPOSABLE MISCELLANEOUS
Qty: 50 EACH | Refills: 1 | Status: SHIPPED | OUTPATIENT
Start: 2022-03-04 | End: 2022-12-21 | Stop reason: ALTCHOICE

## 2022-03-14 RX ORDER — FUROSEMIDE 20 MG/1
TABLET ORAL
Qty: 30 TABLET | Refills: 5 | Status: ON HOLD | OUTPATIENT
Start: 2022-03-14 | End: 2022-05-15 | Stop reason: SDUPTHER

## 2022-03-16 ENCOUNTER — OFFICE VISIT (OUTPATIENT)
Dept: CARDIOLOGY | Facility: CLINIC | Age: 72
End: 2022-03-16

## 2022-03-16 VITALS
WEIGHT: 218.8 LBS | HEART RATE: 79 BPM | OXYGEN SATURATION: 96 % | SYSTOLIC BLOOD PRESSURE: 128 MMHG | HEIGHT: 77 IN | DIASTOLIC BLOOD PRESSURE: 76 MMHG | BODY MASS INDEX: 25.84 KG/M2

## 2022-03-16 DIAGNOSIS — I49.3 PVC'S (PREMATURE VENTRICULAR CONTRACTIONS): ICD-10-CM

## 2022-03-16 DIAGNOSIS — I10 PRIMARY HYPERTENSION: Primary | Chronic | ICD-10-CM

## 2022-03-16 PROCEDURE — 93000 ELECTROCARDIOGRAM COMPLETE: CPT | Performed by: INTERNAL MEDICINE

## 2022-03-16 PROCEDURE — 99213 OFFICE O/P EST LOW 20 MIN: CPT | Performed by: INTERNAL MEDICINE

## 2022-03-16 NOTE — PROGRESS NOTES
OFFICE VISIT      Date of Office Visit: 2022    Patient Name: Jose Hurtado  : 1950    Encounter Provider: James Cyr MD  Referring Provider: No ref. provider found  Primary Care Provider: Brandin Bolton MD  Place of Service: [facility] CARDIOLOGY        Chief Complaint   Patient presents with   • Essential hypertension   • Follow-up     History of Present Illness    The patient is a 72-year-old white male with hypertension as well as premature ventricular contractions and chronic DVTs who presents today for follow-up.  The patient is feeling well without any major complaints other than fatigue.  He denies any palpitations despite having PVCs.  He denies any shortness of breath, orthopnea or paroxysmal nocturnal dyspnea.  He reports that his blood pressure has been under good control.    Past Medical History:   Diagnosis Date   • Acute deep vein thrombosis (DVT) of popliteal vein of left lower extremity (HCC) 2020   • Anemia    • Anti-phospholipid syndrome (HCC)     On lifelong anticoagulation therapy   • Bladder disorder     LEAKAGE   ON MED  wers pads   • Bleeding hemorrhoids    • Community acquired pneumonia     HISTORY OF IN    • Deep venous thrombosis (HCC) ,     Left lower extremity multiple   • Depression    • Esophageal carcinoma (HCC) 2014    had chemo and radiation prior surgery   • Hemorrhoids    • HH (hiatus hernia)    • History of atrial fibrillation 2015    ONE EPISODE WHILE HOSPITALIZED   • History of kidney stones    • History of nephrolithiasis    • History of pancreatitis     PT STATES MANY YEARS AGO   • History of radiation therapy    • History of transfusion    • Hypertension    • Long-term (current) use of anticoagulants, INR goal 2.0-3.0    • Lymphedema    • Malignant neoplasm of prostate (HCC)    • Other hyperlipidemia 2018 lipid panel risk 12.8%   • Restless legs syndrome    • Sleep  apnea     OCCASIONALLY WEARS CPAP   • Squamous carcinoma     on the head         Past Surgical History:   Procedure Laterality Date   • APPENDECTOMY  1950   • BRONCHOSCOPY      (Diagnostic)   • CATARACT EXTRACTION Bilateral 2014   • COLONOSCOPY  12/15/2014    Complete / Description: EH, IH, torts, stool, follow-up colonoscopy due in 5 years.   • COLONOSCOPY N/A 6/13/2017    non-thrombosed external hemorrhoids, normal examined ileum, IH   • COLONOSCOPY N/A 2/26/2019    Procedure: COLONOSCOPY with Cold Polypectomy;  Surgeon: Mulugeta Millan MD;  Location: [facility] ENDOSCOPY;  Service: Gastroenterology   • COLONOSCOPY N/A 12/16/2020    Procedure: COLONOSCOPY to cecum into TI;  Surgeon: Mesha Sanchez MD;  Location: [facility] ENDOSCOPY;  Service: Gastroenterology;  Laterality: N/A;  pre: lower GI bleed  post: hemorrhoids    • CYSTOSCOPY W/ LASER LITHOTRIPSY     • ENDOSCOPY N/A 6/13/2017    Procedure: ESOPHAGOGASTRODUODENOSCOPY WITH COLD BIOPSY;  Surgeon: Lake Gonzalez MD;  Location: [facility] ENDOSCOPY;  Service:    • ENDOSCOPY N/A 11/18/2021    Procedure: ESOPHAGOGASTRODUODENOSCOPY WITH  DILATATION WITH FLUOROSCOPY;  Surgeon: Jose Christine III, MD;  Location: [facility] ENDOSCOPY;  Service: Gastroenterology;  Laterality: N/A;  Pre: dysphagia, h/x of adinocarcinoma of esophagus  Post: same   • ESOPHAGECTOMY      April 2015, stage IIB esophageal carcinoma, sub-total resection.   • ESOPHAGECTOMY      Esophagectomy Subtotal Andrea Joe Procedure   • EXCISION LESION  08/2012    Removal of Squamous Cell CA on Head   • HAMMER TOE REPAIR  09/2014    Hammertoe Operation (Each Toe), 10/2014   • HAMMER TOE REPAIR Left 10/3/2017    Procedure: Left second third and fourth distal interphalangeal joint resection with flexor tenotomy;  Surgeon: Mulugeta Lira MD;  Location: [facility] OR [facility];  Service:    • HEMORRHOIDECTOMY N/A 12/23/2020    Procedure: HEMORRHOIDECTOMY;  Surgeon: Billy Julio Jr., MD;  Location: [facility]  MAIN OR;  Service: General;  Laterality: N/A;   • HERNIA REPAIR      incisional   • JEJUNOSTOMY      Laparoscopic   • JEJUNOSTOMY      tube removal    • KNEE SURGERY Bilateral 1967, 1973, 1981   • PATELLA SURGERY Left     removed   • PILONIDAL CYST / SINUS EXCISION     • UT TOTAL KNEE ARTHROPLASTY Right 3/26/2018    Procedure: TOTAL KNEE ARTHROPLASTY;  Surgeon: Renny Solis MD;  Location: [facility] OR;  Service: Orthopedics   • PROSTATECTOMY  2010   • PYLOROPLASTY     • SPINAL FUSION  02/1998    C 5,6   • TONSILLECTOMY     • UPPER GASTROINTESTINAL ENDOSCOPY  12/15/2014    LA Grade D esophagitis, Pardo's, HH, multiple duodenal ulcers   • VENTRAL/INCISIONAL HERNIA REPAIR N/A 4/14/2016    Procedure: VENTRAL/INCISIONAL HERNIA REPAIR, open, with mesh, and component separation;  Surgeon: Darren Rivas MD;  Location: [facility] MAIN OR;  Service:            Current Outpatient Medications:   •  albuterol sulfate  (90 Base) MCG/ACT inhaler, Inhale 1 puff Every 4 (Four) Hours As Needed for Wheezing., Disp: , Rfl:   •  Cyanocobalamin 1000 MCG/ML kit, Inject 1 mL as directed Every 30 (Thirty) Days. Intramuscularly., Disp: 1 kit, Rfl: 11  •  Eliquis 5 MG tablet tablet, TAKE ONE TABLET BY MOUTH EVERY 12 HOURS, Disp: 60 tablet, Rfl: 1  •  furosemide (LASIX) 20 MG tablet, TAKE ONE TABLET BY MOUTH DAILY, Disp: 30 tablet, Rfl: 5  •  ketoconazole (NIZORAL) 2 % cream, , Disp: , Rfl:   •  metoprolol tartrate (LOPRESSOR) 25 MG tablet, TAKE ONE TABLET BY MOUTH TWICE A DAY, Disp: 180 tablet, Rfl: 3  •  pantoprazole (PROTONIX) 40 MG EC tablet, TAKE ONE TABLET BY MOUTH TWICE A DAY BEFORE A MEAL, Disp: 180 tablet, Rfl: 3  •  PARoxetine (PAXIL) 40 MG tablet, TAKE ONE TABLET BY MOUTH EVERY MORNING, Disp: 30 tablet, Rfl: 5  •  potassium chloride (MICRO-K) 10 MEQ CR capsule, TAKE ONE CAPSULE BY MOUTH DAILY, Disp: 30 capsule, Rfl: 5  •  pramipexole (MIRAPEX) 1.5 MG tablet, TAKE ONE TABLET BY MOUTH TWICE A DAY (Patient taking  "differently: 3 mg Every Night. 2 tabs of 1.5 mg), Disp: 180 tablet, Rfl: 1  •  Syringe 25G X 1\" 3 ML misc, Use along with B12 . 1 ml into the appropriate side of the muscle once every 30 days., Disp: 50 each, Rfl: 1      Social History     Socioeconomic History   • Marital status:      Spouse name: Lena   • Number of children: 0   • Years of education: College   Tobacco Use   • Smoking status: Former Smoker     Packs/day: 2.00     Years: 3.00     Pack years: 6.00     Types: Cigarettes     Quit date:      Years since quittin.2   • Smokeless tobacco: Former User     Types: Chew   • Tobacco comment: no caffeine    Vaping Use   • Vaping Use: Never used   Substance and Sexual Activity   • Alcohol use: Yes     Alcohol/week: 3.0 standard drinks     Types: 1 Glasses of wine, 1 Cans of beer, 1 Shots of liquor per week     Comment: 1-2 drinks/week   • Drug use: Not Currently     Types: Marijuana     Comment: hx marijuana use \"a long time ago\"   • Sexual activity: Defer         Review of Systems   Constitutional: Positive for malaise/fatigue.   HENT: Negative.    Eyes: Negative.    Cardiovascular: Negative.    Respiratory: Negative.    Endocrine: Negative.    Skin: Negative.    Musculoskeletal: Negative.    Gastrointestinal: Negative.    Neurological: Negative.    Psychiatric/Behavioral: Negative.        Procedures      ECG 12 Lead    Date/Time: 3/16/2022 9:55 AM  Performed by: James Cyr MD  Authorized by: James Cyr MD   Comparison: compared with previous ECG from 2021  Similar to previous ECG  Rhythm: sinus rhythm  Ectopy: bigeminy  Rate: normal  Conduction: conduction normal  ST Segments: ST segments normal  T Waves: T waves normal  QRS axis: normal                  Objective:    /76 (BP Location: Left arm, Patient Position: Sitting)   Pulse 79   Ht 195.6 cm (77\")   Wt 99.2 kg (218 lb 12.8 oz)   SpO2 96%   BMI 25.95 kg/m²         Vitals reviewed.   Constitutional:      "  Appearance: Well-developed.   Eyes:      Pupils: Pupils are equal, round, and reactive to light.   HENT:      Head: Normocephalic.   Neck:      Thyroid: No thyromegaly.      Vascular: No carotid bruit or JVD.   Pulmonary:      Effort: Pulmonary effort is normal.      Breath sounds: Normal breath sounds.   Cardiovascular:      Normal rate. Regular rhythm. Normal S1. Normal S2.      Murmurs: There is no murmur.      No gallop.   Pulses:     Intact distal pulses.   Edema:     Peripheral edema absent.   Abdominal:      General: Bowel sounds are normal.      Palpations: Abdomen is soft.   Musculoskeletal:      Cervical back: Normal range of motion. Skin:     General: Skin is warm and dry.      Findings: No erythema.   Neurological:      Mental Status: Alert and oriented to person, place, and time.             Assessment & Plan:       Diagnosis Plan   1. Primary hypertension     2. PVC's (premature ventricular contractions)       1.  Hypertension: Controlled on current medical therapy  2.  Chronic PVCs: Continue metoprolol  3.  Chronic DVT: On long-term anticoagulation.

## 2022-03-22 ENCOUNTER — HOSPITAL ENCOUNTER (OUTPATIENT)
Dept: CT IMAGING | Facility: HOSPITAL | Age: 72
Discharge: HOME | End: 2022-03-22
Admitting: THORACIC SURGERY (CARDIOTHORACIC VASCULAR SURGERY)

## 2022-03-22 ENCOUNTER — LAB (OUTPATIENT)
Dept: OTHER | Facility: HOSPITAL | Age: 72
End: 2022-03-22

## 2022-03-22 ENCOUNTER — OFFICE VISIT (OUTPATIENT)
Dept: ONCOLOGY | Facility: CLINIC | Age: 72
End: 2022-03-22

## 2022-03-22 VITALS
WEIGHT: 220.9 LBS | HEIGHT: 77 IN | RESPIRATION RATE: 17 BRPM | SYSTOLIC BLOOD PRESSURE: 131 MMHG | OXYGEN SATURATION: 96 % | BODY MASS INDEX: 26.08 KG/M2 | HEART RATE: 53 BPM | TEMPERATURE: 97.9 F | DIASTOLIC BLOOD PRESSURE: 74 MMHG

## 2022-03-22 DIAGNOSIS — C15.9 ADENOCARCINOMA OF ESOPHAGUS: Primary | ICD-10-CM

## 2022-03-22 DIAGNOSIS — C15.9 ADENOCARCINOMA OF ESOPHAGUS: ICD-10-CM

## 2022-03-22 DIAGNOSIS — D64.9 ANEMIA, UNSPECIFIED TYPE: ICD-10-CM

## 2022-03-22 DIAGNOSIS — Z48.3 AFTERCARE FOLLOWING SURGERY FOR NEOPLASM: ICD-10-CM

## 2022-03-22 LAB
BASOPHILS # BLD AUTO: 0.02 10*3/MM3 (ref 0–0.2)
BASOPHILS NFR BLD AUTO: 0.6 % (ref 0–1.5)
CREAT BLDA-MCNC: 1.5 MG/DL (ref 0.6–1.3)
DEPRECATED RDW RBC AUTO: 49.1 FL (ref 37–54)
EOSINOPHIL # BLD AUTO: 0.27 10*3/MM3 (ref 0–0.4)
EOSINOPHIL NFR BLD AUTO: 7.9 % (ref 0.3–6.2)
ERYTHROCYTE [DISTWIDTH] IN BLOOD BY AUTOMATED COUNT: 14.1 % (ref 12.3–15.4)
FERRITIN SERPL-MCNC: 111.6 NG/ML (ref 30–400)
HCT VFR BLD AUTO: 34 % (ref 37.5–51)
HGB BLD-MCNC: 10.6 G/DL (ref 13–17.7)
HGB RETIC QN AUTO: 31.9 PG (ref 29.8–36.1)
IMM GRANULOCYTES # BLD AUTO: 0.01 10*3/MM3 (ref 0–0.05)
IMM GRANULOCYTES NFR BLD AUTO: 0.3 % (ref 0–0.5)
IMM RETICS NFR: 13 % (ref 3–15.8)
IRON 24H UR-MRATE: 43 MCG/DL (ref 59–158)
IRON SATN MFR SERPL: 12 % (ref 20–50)
LYMPHOCYTES # BLD AUTO: 0.99 10*3/MM3 (ref 0.7–3.1)
LYMPHOCYTES NFR BLD AUTO: 29.1 % (ref 19.6–45.3)
MCH RBC QN AUTO: 29.6 PG (ref 26.6–33)
MCHC RBC AUTO-ENTMCNC: 31.2 G/DL (ref 31.5–35.7)
MCV RBC AUTO: 95 FL (ref 79–97)
MONOCYTES # BLD AUTO: 0.36 10*3/MM3 (ref 0.1–0.9)
MONOCYTES NFR BLD AUTO: 10.6 % (ref 5–12)
NEUTROPHILS NFR BLD AUTO: 1.75 10*3/MM3 (ref 1.7–7)
NEUTROPHILS NFR BLD AUTO: 51.5 % (ref 42.7–76)
NRBC BLD AUTO-RTO: 0 /100 WBC (ref 0–0.2)
PLATELET # BLD AUTO: 142 10*3/MM3 (ref 140–450)
PMV BLD AUTO: 10.1 FL (ref 6–12)
RBC # BLD AUTO: 3.58 10*6/MM3 (ref 4.14–5.8)
RETICS # AUTO: 0.03 10*6/MM3 (ref 0.02–0.13)
RETICS/RBC NFR AUTO: 0.93 % (ref 0.7–1.9)
TIBC SERPL-MCNC: 367 MCG/DL (ref 298–536)
TRANSFERRIN SERPL-MCNC: 246 MG/DL (ref 200–360)
WBC NRBC COR # BLD: 3.4 10*3/MM3 (ref 3.4–10.8)

## 2022-03-22 PROCEDURE — 85046 RETICYTE/HGB CONCENTRATE: CPT | Performed by: INTERNAL MEDICINE

## 2022-03-22 PROCEDURE — 82565 ASSAY OF CREATININE: CPT

## 2022-03-22 PROCEDURE — 71260 CT THORAX DX C+: CPT

## 2022-03-22 PROCEDURE — 82728 ASSAY OF FERRITIN: CPT | Performed by: INTERNAL MEDICINE

## 2022-03-22 PROCEDURE — 84466 ASSAY OF TRANSFERRIN: CPT | Performed by: INTERNAL MEDICINE

## 2022-03-22 PROCEDURE — 36415 COLL VENOUS BLD VENIPUNCTURE: CPT

## 2022-03-22 PROCEDURE — 25010000002 IOPAMIDOL 61 % SOLUTION: Performed by: THORACIC SURGERY (CARDIOTHORACIC VASCULAR SURGERY)

## 2022-03-22 PROCEDURE — 99214 OFFICE O/P EST MOD 30 MIN: CPT | Performed by: INTERNAL MEDICINE

## 2022-03-22 PROCEDURE — 74160 CT ABDOMEN W/CONTRAST: CPT

## 2022-03-22 PROCEDURE — 85025 COMPLETE CBC W/AUTO DIFF WBC: CPT | Performed by: INTERNAL MEDICINE

## 2022-03-22 PROCEDURE — 83540 ASSAY OF IRON: CPT | Performed by: INTERNAL MEDICINE

## 2022-03-22 RX ADMIN — IOPAMIDOL 85 ML: 612 INJECTION, SOLUTION INTRAVENOUS at 11:47

## 2022-03-22 NOTE — PROGRESS NOTES
Subjective .     REASONS FOR FOLLOWUP:  Esophageal cancer, cytopenias     HISTORY OF PRESENT ILLNESS:  The patient is a 72 y.o. year old male  who is here for follow-up with the above-mentioned history.    Little more tired recently.  No bleeding.  No chest pain or SOA.  No fever, chills, weight loss, night sweats    Past Medical History:   Diagnosis Date   • Acute deep vein thrombosis (DVT) of popliteal vein of left lower extremity (HCC) 03/24/2020   • Anemia    • Anti-phospholipid syndrome (HCC)     On lifelong anticoagulation therapy   • Bladder disorder     LEAKAGE   ON MED  wers pads   • Bleeding hemorrhoids    • Community acquired pneumonia     HISTORY OF IN 2014   • Deep venous thrombosis (HCC) 2006, 2008    Left lower extremity multiple   • Depression    • Esophageal carcinoma (HCC) 12/31/2014    had chemo and radiation prior surgery   • Hemorrhoids    • HH (hiatus hernia)    • History of atrial fibrillation 2015    ONE EPISODE WHILE HOSPITALIZED   • History of kidney stones    • History of nephrolithiasis    • History of pancreatitis     PT STATES MANY YEARS AGO   • History of radiation therapy    • History of transfusion    • Hypertension    • Long-term (current) use of anticoagulants, INR goal 2.0-3.0    • Lymphedema    • Malignant neoplasm of prostate (HCC)    • Other hyperlipidemia 1/30/2018 January 30, 2018 lipid panel risk 12.8%   • Restless legs syndrome    • Sleep apnea     OCCASIONALLY WEARS CPAP   • Squamous carcinoma     on the head     Past Surgical History:   Procedure Laterality Date   • APPENDECTOMY  1950   • BRONCHOSCOPY      (Diagnostic)   • CATARACT EXTRACTION Bilateral 2014   • COLONOSCOPY  12/15/2014    Complete / Description: EH, IH, torts, stool, follow-up colonoscopy due in 5 years.   • COLONOSCOPY N/A 6/13/2017    non-thrombosed external hemorrhoids, normal examined ileum, IH   • COLONOSCOPY N/A 2/26/2019    Procedure: COLONOSCOPY with Cold Polypectomy;  Surgeon: Yana  Mulugeta BLACKWELL MD;  Location: [facility] ENDOSCOPY;  Service: Gastroenterology   • COLONOSCOPY N/A 12/16/2020    Procedure: COLONOSCOPY to cecum into TI;  Surgeon: Mesha Sanchez MD;  [facility] END[facility];  Service: Gastroenterology;  Laterality: N/A;  pre: lower GI bleed  post: hemorrhoids    • CYSTOSCOPY W/ LASER LITHOTRIPSY     • ENDOSCOPY N/A 6/13/2017    Procedure: ESOPHAGOGASTRODUODENOSCOPY WITH COLD BIOPSY;  Surgeon: Lake Gonzalez MD;  Location: [facility] ENDOSCOPY;  Service:    • ENDOSCOPY N/A 11/18/2021    Procedure: ESOPHAGOGASTRODUODENOSCOPY WITH  DILATATION WITH FLUOROSCOPY;  Surgeon: Jose Christine III, MD;  Location: [facility] ENDOSCOPY;  Service: Gastroenterology;  Laterality: N/A;  Pre: dysphagia, h/x of adinocarcinoma of esophagus  Post: same   • ESOPHAGECTOMY      April 2015, stage IIB esophageal carcinoma, sub-total resection.   • ESOPHAGECTOMY      Esophagectomy Subtotal Andrea Joe Procedure   • EXCISION LESION  08/2012    Removal of Squamous Cell CA on Head   • HAMMER TOE REPAIR  09/2014    Hammertoe Operation (Each Toe), 10/2014   • HAMMER TOE REPAIR Left 10/3/2017    Procedure: Left second third and fourth distal interphalangeal joint resection with flexor tenotomy;  Surgeon: Mulugeta Lira MD;  Location: [facility] OR OSC;  Service:    • HEMORRHOIDECTOMY N/A 12/23/2020    Procedure: HEMORRHOIDECTOMY;  Surgeon: Billy Julio Jr., MD;  Location: [facility] MAIN OR;  Service: General;  Laterality: N/A;   • HERNIA REPAIR      incisional   • JEJUNOSTOMY      Laparoscopic   • JEJUNOSTOMY      tube removal    • KNEE SURGERY Bilateral 1967, 1973, 1981   • PATELLA SURGERY Left     removed   • PILONIDAL CYST / SINUS EXCISION     • MO TOTAL KNEE ARTHROPLASTY Right 3/26/2018    Procedure: TOTAL KNEE ARTHROPLASTY;  Surgeon: Renny Solis MD;  Location: [facility] MAIN OR;  Service: Orthopedics   • PROSTATECTOMY  2010   • PYLOROPLASTY     • SPINAL FUSION  02/1998    C 5,6   • TONSILLECTOMY     • UPPER  "GASTROINTESTINAL ENDOSCOPY  12/15/2014    LA Grade D esophagitis, Pardo's, HH, multiple duodenal ulcers   • VENTRAL/INCISIONAL HERNIA REPAIR N/A 4/14/2016    Procedure: VENTRAL/INCISIONAL HERNIA REPAIR, open, with mesh, and component separation;  Surgeon: Darren Rivas MD;  Location: [facility];  Service:        HEMATOLOGIC/ONCOLOGIC HISTORY:  (History from previous dates can be found in the separate document.)    MEDICATIONS    Current Outpatient Medications:   •  albuterol sulfate  (90 Base) MCG/ACT inhaler, Inhale 1 puff Every 4 (Four) Hours As Needed for Wheezing., Disp: , Rfl:   •  Cyanocobalamin 1000 MCG/ML kit, Inject 1 mL as directed Every 30 (Thirty) Days. Intramuscularly., Disp: 1 kit, Rfl: 11  •  Eliquis 5 MG tablet tablet, TAKE ONE TABLET BY MOUTH EVERY 12 HOURS, Disp: 60 tablet, Rfl: 1  •  furosemide (LASIX) 20 MG tablet, TAKE ONE TABLET BY MOUTH DAILY, Disp: 30 tablet, Rfl: 5  •  ketoconazole (NIZORAL) 2 % cream, , Disp: , Rfl:   •  metoprolol tartrate (LOPRESSOR) 25 MG tablet, TAKE ONE TABLET BY MOUTH TWICE A DAY, Disp: 180 tablet, Rfl: 3  •  pantoprazole (PROTONIX) 40 MG EC tablet, TAKE ONE TABLET BY MOUTH TWICE A DAY BEFORE A MEAL, Disp: 180 tablet, Rfl: 3  •  PARoxetine (PAXIL) 40 MG tablet, TAKE ONE TABLET BY MOUTH EVERY MORNING, Disp: 30 tablet, Rfl: 5  •  potassium chloride (MICRO-K) 10 MEQ CR capsule, TAKE ONE CAPSULE BY MOUTH DAILY, Disp: 30 capsule, Rfl: 5  •  pramipexole (MIRAPEX) 1.5 MG tablet, TAKE ONE TABLET BY MOUTH TWICE A DAY (Patient taking differently: 3 mg Every Night. 2 tabs of 1.5 mg), Disp: 180 tablet, Rfl: 1  •  Syringe 25G X 1\" 3 ML misc, Use along with B12 . 1 ml into the appropriate side of the muscle once every 30 days., Disp: 50 each, Rfl: 1    ALLERGIES:   No Known Allergies    SOCIAL HISTORY:       Social History     Socioeconomic History   • Marital status:      Spouse name: Lena   • Number of children: 0   • Years of education: College " "  Tobacco Use   • Smoking status: Former Smoker     Packs/day: 2.00     Years: 3.00     Pack years: 6.00     Types: Cigarettes     Quit date:      Years since quittin.2   • Smokeless tobacco: Former User     Types: Chew   • Tobacco comment: no caffeine    Vaping Use   • Vaping Use: Never used   Substance and Sexual Activity   • Alcohol use: Yes     Alcohol/week: 3.0 standard drinks     Types: 1 Glasses of wine, 1 Cans of beer, 1 Shots of liquor per week     Comment: 1-2 drinks/week   • Drug use: Not Currently     Types: Marijuana     Comment: hx marijuana use \"a long time ago\"   • Sexual activity: Defer         FAMILY HISTORY:  Family History   Problem Relation Age of Onset   • Cerebral aneurysm Mother         cerebral artery aneurysm ( age 56)   • Prostate cancer Brother 68   • Anxiety disorder Father    • Suicide Attempts Father          of suicide   • Cancer Father         bladder   • No Known Problems Brother    • Pancreatic cancer Nephew    • Colon cancer Neg Hx    • Esophageal cancer Neg Hx    • Dementia Neg Hx    • Malig Hyperthermia Neg Hx        REVIEW OF SYSTEMS:    Review of Systems   Constitutional: Negative for activity change.   HENT: Negative for nosebleeds and trouble swallowing.    Respiratory: Negative for shortness of breath and wheezing.    Cardiovascular: Negative for chest pain and palpitations.   Gastrointestinal: Negative for constipation and diarrhea.   Genitourinary: Negative for dysuria and hematuria.   Musculoskeletal: Negative for arthralgias and myalgias.   Neurological: Negative for seizures and syncope.   Hematological: Negative for adenopathy. Does not bruise/bleed easily.   Psychiatric/Behavioral: Negative for confusion.           Objective    Vitals:    22 0933   BP: 131/74   Pulse: 53   Resp: 17   Temp: 97.9 °F (36.6 °C)   TempSrc: Temporal   SpO2: 96%   Weight: 100 kg (220 lb 14.4 oz)   Height: 195.6 cm (77.01\")   PainSc: 0-No pain     Current Status " 3/22/2022   ECOG score 1      PHYSICAL EXAM:        CONSTITUTIONAL:  Vital signs reviewed.  No distress, looks comfortable.  EYES:  Conjunctiva and lids unremarkable.  PERRLA  EARS,NOSE,MOUTH,THROAT:  Ears and nose appear unremarkable.  Lips, teeth, gums appear unremarkable.  RESPIRATORY:  Normal respiratory effort.  Lungs clear to auscultation bilaterally.  CARDIOVASCULAR:  Normal S1, S2.  No murmurs rubs or gallops.  No change significant bilateral lower extremity edema (he has been to the lymphedema clinic for this)  GASTROINTESTINAL: Abdomen appears unremarkable.  Nontender.  No hepatomegaly.  No splenomegaly.  LYMPHATIC:  No cervical, supraclavicular, axillary lymphadenopathy.  SKIN:  Warm.  No rashes.  PSYCHIATRIC:  Normal judgment and insight.  Normal mood and affect.             RECENT LABS:        WBC   Date/Time Value Ref Range Status   03/22/2022 09:14 AM 3.40 3.40 - 10.80 10*3/mm3 Final   04/15/2019 12:31 PM 3.53 3.40 - 10.80 10*3/mm3 Final   04/16/2018 04:25 PM 4.23 (L) 4.5 - 11.0 10*3/uL Final     Hemoglobin   Date/Time Value Ref Range Status   03/22/2022 09:14 AM 10.6 (L) 13.0 - 17.7 g/dL Final   04/16/2018 04:25 PM 9.9 (L) 13.5 - 17.5 g/dL Final     Platelets   Date/Time Value Ref Range Status   03/22/2022 09:14  140 - 450 10*3/mm3 Final   04/16/2018 04:25  140 - 440 10*3/uL Final       Assessment/Plan     ASSESSMENT:  Adenocarcinoma of esophagus (HCC)  - Ferritin  - Iron Profile  - Retic With IRF & RET-He  - CBC & Differential    Anemia, unspecified type  - Ferritin  - Iron Profile  - Retic With IRF & RET-He  - CBC & Differential      *Esophageal adenocarcinoma. Initially T2N0M0. After neoadjuvant carboplatin/Taxol with radiation, achieved a pathologic CR at resection, 4/24/2015.   · As per NCCN guidelines, plan CAT scans every 6 months x1 year, then every 6 to 9 months on years 2 and years 3. Defer any surveillance EGDs to Dr. Gonzalez if he feels they are appropriate.   · Also as per NCCN  guidelines, plan H and P every 3 to 6 months on years 1 and years 2, then every 6 to 12 months' time on years 3 through years 5 and then annually.   · EGD 6/13/17 by Dr. Gonzalez: No evidence of recurrence.  · CT scan 3/2/18 (this completed 3 years of surveillance CT): No evidence of recurrence.  Plan no more surveillance CT.  · EGD 11/18/2021, Dr. Christine: No evidence of recurrent cancer.  Dilated.  No signs of recurrence.  Continues in remission.    *Recurrent DVT, prior to cancer diagnosis.    · Dr. Bolton previously managed his Coumadin.   · Chronic left leg larger than right, since DVT  · Acute left popliteal, gastrocnemius DVT on 3/24/2020 despite INR 3.4.  Therefore, changed to Eliquis.  · On 3/25/2020, unremarkable: Lupus anticoagulant, beta-2 glycoprotein antibodies.  Anticardiolipin antibodies also felt to be unremarkable with IgG and IgM both at 15 (negative is <15.  Indeterminate is 15-20).  No signs of recurrent DVT.    *CT angiogram chest 3/24/2020 (LLE acute DVT found 3/24/2020): Mild increased opacity LLL may represent developing infiltrate versus atelectasis.  Radiologist recommended follow-up.  · CT chest 5/1/2020: Resolution of groundglass LLL infiltrate from the 3/24/2020 CT.  A few mildly enlarged mediastinal nodes are less prominent than on the 3/18/2020 CT.  No new abnormalities.  Plan no more follow-up CT scans.    *Persistent cough.  He has seen Dr. Maher Sayied for this.    He did not complain of this today.    *Previously complained of emesis occurring 5 hours after eating on average once every month or 2.  No nausea otherwise.  Denies dysphagia or odynophagia.    Unchanged.  Occurring on average once every couple of months.  He did not complain of this today    *Iron deficiency anemia  · Hb mostly around 11  · On 4/15/2019, ferritin 29.7, 13% saturation.  Serum folate normal.  · On oral iron daily through PCP.  · On 8/23/19, ferritin 65, 14%.   · Increased oral iron.   · On 10/15/19, ferritin  72, 10%.  · On 10/25/2019, stopped oral iron since it was not helping.    · Oral iron not effective due to poor absorption  · 2 doses Injectafer.  · On 12/13/2019, ferritin 708, 15% saturation, Hb 10.4.  · Therefore, no IV iron.  Monitor.  · On 5/5/2020, ferritin 346, 15% saturation, Hb 9.9.  Therefore, no need for IV iron at this time.  · On 7/7/2020, Hb up to 11.7.  Iron labs normal.  · Admission 12/15/2020: Hb 6.6.  Ferritin 40, iron saturation 17%.  Discharged on 12/21/2020 with Hb 7.1, which fell from 7.9 on 12/18/2020, from 8.9 in the early morning 12/18/2020.  The drop in Hb was thought to be due to hemorrhoidal bleeding related to Eliquis.  Eliquis was stopped.  Hemorrhoidal repair planned by Dr. Flynn Julio after the holidays.  Was given Venofer 300 mg on 12/17/2020 by Dr. Ross of our practice  · On 12/29/2020, ferritin 176, 13% saturation, Hb 8.4, reticulate hemoglobin low at 25.7.  Suspect he would benefit from some more iron.  Given 1 dose Injectafer.  · On 2/2/2021, ferritin 317, 17% saturation, Hb 10.3.  Therefore, Hb gradually improved since the 1 dose of Injectafer.  · 3/2/2021: Ferritin iron 93, 11% saturation, Hb 11.9.  1 dose Injectafer.  · 3/23/2021: Ferritin 471, 20% saturation, Hb 10.8  · 7/6/2021: Ferritin 329, 21% saturation, Hb 10.8  · 9/28/2021: Ferritin 230, 20% saturation, Hb 11.4.  No need for IV iron.  · 12/28/2021: Ferritin 337, 6% iron saturation, Hb 10.4.  No IV iron given.  (Although saturation is low, ferritin is 337).  · 3/22/2022: Ferritin 112, 12% saturation, Hb 10.6.  Plan 1 dose Injectafer.    *Hemorrhoidal bleeding with Hb down to 6.6, requiring admission, 12/15/2020.  Thought to be related to Eliquis.  · Eliquis was stopped.  · Hemorrhoidal repair by Dr. Flynn Julio, 12/23/2020  · Eliquis subsequently restarted with no more issues with bleeding.  · 1 episode of dark stools yesterday, 9/27/2021.  Told him if he notices more of this he should contact   Laura.  · 3/22/2022: Denies any rectal bleeding.  States this has not really been an issue since the hemorrhoidal repair    *9/28/2021: Change in stool frequency.  · Was having a bowel movement 3 times per week.  For the past week has been having 3 formed bowel movements per day.  I told him if this persists he should discuss with Dr. Sanchez.    *Source of iron deficiency.  · Last colonoscopy 2/26/2019 by Dr. Millan.  Last EGD 6/13/2017 by Dr. Gonzalez.  · Suspect he has an absorption issue due to previous esophageal surgery.    *B12 deficiency.  · On 6/6/2019, B12 <150  · On B12 injections through PCP.  · B12 on 5/5/2020 normal at 694  · B12 on 2/2/2021, 789    *Anemia for reasons in addition to iron deficiency and B12 deficiency  · Baseline Hb mostly 10.5-11.5.  · On 5/5/2020, unremarkable: RBC folate, iron labs, B12, haptoglobin, TB, LDH, direct Maki.  · Creatinine baseline is 0.9-1.2   · Hb 9.9 on 5/5/2020  · On 7/7/2020, Hb up to 11.7.  Return to prior follow-up plan.  · On 2/2/2021, B12 and RBC folate unremarkable  · 3/23/2021: Hb 10.8 despite normal iron stores.  · 5/25/2021, Hb 10.7 with normal iron labs  · 7/6/2021, Hb 10.8 with normal iron labs  · Hb 10.6    *Leukocytopenia  · New issue on 3/23/2021, WBC 3.38, based on recent labs, but WBC has been as low as 2.9 December 2019  · WBC 3.4    *Neutropenia  · ANC 1590 on 3/23/2021 (previously ANC has been as low as 1480 with WBC in the low normal range)  · ANC 1750    *Thrombocytopenia  · New issue on 3/23/2021, , based on recent labs, but PLT has been as low as 119 October 2019  ·     *He had a white blood cell count in the upper 3s and a hemoglobin in the upper 12s with a normal platelet count prior to beginning chemotherapy.     *12/28/2021: Sinus pressure, yellow rhinorrhea, nasal congestion, cough.  He sees his internist tomorrow.  In the meantime, recommended Afrin and a steroid nose spray.  I told him to not take the Afrin more than 3  days consecutively.    PLAN:  · 1 dose Injectafer.  · MD CBC stat ferritin, iron panel, reticulate hemoglobin 3 months  · Baseline Hb when not in the hospital mostly 9.5-11.5  · Continue Eliquis  · On 7/6/2021, he stated he had been forgetting his morning dose.  He insists he will do better about taking twice per day dosing    · hemorrhoids have been repaired.  Last drop of Hb was down to 7.1 on 12/21/2020.  (Hemorrhoidal bleeding)    Prior plan was:  · MD every 6-month.  · No more surveillance CT scans.  · He does not have a port.    I have discussed with him if Hb drops and remains around 9 or so, we may want to plan a bone marrow biopsy

## 2022-03-23 ENCOUNTER — INFUSION (OUTPATIENT)
Dept: ONCOLOGY | Facility: HOSPITAL | Age: 72
End: 2022-03-23

## 2022-03-23 VITALS
OXYGEN SATURATION: 96 % | WEIGHT: 223.2 LBS | SYSTOLIC BLOOD PRESSURE: 127 MMHG | DIASTOLIC BLOOD PRESSURE: 63 MMHG | RESPIRATION RATE: 20 BRPM | TEMPERATURE: 97.5 F | HEART RATE: 54 BPM | BODY MASS INDEX: 26.46 KG/M2

## 2022-03-23 DIAGNOSIS — K90.9 MALABSORPTION OF IRON: Primary | ICD-10-CM

## 2022-03-23 DIAGNOSIS — D50.9 IRON DEFICIENCY ANEMIA, UNSPECIFIED IRON DEFICIENCY ANEMIA TYPE: ICD-10-CM

## 2022-03-23 PROCEDURE — 63710000001 PROCHLORPERAZINE MALEATE PER 5 MG: Performed by: INTERNAL MEDICINE

## 2022-03-23 PROCEDURE — 96374 THER/PROPH/DIAG INJ IV PUSH: CPT

## 2022-03-23 PROCEDURE — 96375 TX/PRO/DX INJ NEW DRUG ADDON: CPT

## 2022-03-23 PROCEDURE — 25010000002 FERRIC CARBOXYMALTOSE 750 MG/15ML SOLUTION 15 ML VIAL: Performed by: INTERNAL MEDICINE

## 2022-03-23 RX ORDER — SODIUM CHLORIDE 9 MG/ML
250 INJECTION, SOLUTION INTRAVENOUS ONCE
Status: COMPLETED | OUTPATIENT
Start: 2022-03-23 | End: 2022-03-23

## 2022-03-23 RX ORDER — PROCHLORPERAZINE MALEATE 5 MG/1
10 TABLET ORAL ONCE
Status: COMPLETED | OUTPATIENT
Start: 2022-03-23 | End: 2022-03-23

## 2022-03-23 RX ADMIN — FERRIC CARBOXYMALTOSE INJECTION 750 MG: 50 INJECTION, SOLUTION INTRAVENOUS at 13:01

## 2022-03-23 RX ADMIN — SODIUM CHLORIDE 250 ML: 9 INJECTION, SOLUTION INTRAVENOUS at 12:59

## 2022-03-23 RX ADMIN — PROCHLORPERAZINE MALEATE 10 MG: 5 TABLET ORAL at 12:59

## 2022-03-30 ENCOUNTER — OFFICE VISIT (OUTPATIENT)
Dept: SURGERY | Facility: CLINIC | Age: 72
End: 2022-03-30

## 2022-03-30 VITALS
BODY MASS INDEX: 24.79 KG/M2 | HEART RATE: 78 BPM | DIASTOLIC BLOOD PRESSURE: 62 MMHG | HEIGHT: 77 IN | OXYGEN SATURATION: 98 % | SYSTOLIC BLOOD PRESSURE: 132 MMHG | WEIGHT: 210 LBS

## 2022-03-30 DIAGNOSIS — C15.9 ADENOCARCINOMA OF ESOPHAGUS: Primary | ICD-10-CM

## 2022-03-30 PROCEDURE — 99213 OFFICE O/P EST LOW 20 MIN: CPT | Performed by: NURSE PRACTITIONER

## 2022-03-30 RX ORDER — VIBEGRON 75 MG/1
TABLET, FILM COATED ORAL DAILY
COMMUNITY
Start: 2022-03-28

## 2022-03-30 NOTE — PROGRESS NOTES
"Chief Complaint  Esophageal cancer, follow-up with CT of the chest    Subjective          Jose Hurtado presents to [facility] for continued follow-up and surveillance of an esophageal malignancy.    History of Present Illness     Jose Hurtado is a pleasant 72-year-old gentleman who presents to our office for continued follow-up and surveillance of esophageal malignancy.  The patient underwent an [place] Joe esophagectomy by Dr. Lake Gonzalez in April 2015.  He has done well since that procedure.  He did have some issues with retching and difficulty passing food last fall.  He underwent an EGD with dilation by Dr. Christine and denies any further issues since that time.  He has maintained his weight, had no difficulty with swallowing or issues with aspiration.  He denies any unexplained weight loss, fever, chills or hemoptysis.  His only complaint is some issues with arthritis in his knees.    Objective   Vital Signs:   /62 (BP Location: Right arm, Patient Position: Sitting, Cuff Size: Adult)   Pulse 78   Ht 195.6 cm (77\")   Wt 95.3 kg (210 lb)   SpO2 98%   BMI 24.90 kg/m²            Physical Exam  Vitals and nursing note reviewed.   Constitutional:       Appearance: Normal appearance.   HENT:      Head: Normocephalic and atraumatic.   Cardiovascular:      Rate and Rhythm: Normal rate and regular rhythm.      Pulses: Normal pulses.      Heart sounds: Normal heart sounds.   Pulmonary:      Effort: Pulmonary effort is normal.      Breath sounds: Normal breath sounds. No wheezing, rhonchi or rales.   Abdominal:      General: Bowel sounds are normal.      Palpations: Abdomen is soft. There is no mass.   Neurological:      General: No focal deficit present.      Mental Status: He is alert. Mental status is at baseline.        Result Review :{Labs  Result Review  Imaging  Med Tab  Media  Procedures :23}   The following data was reviewed by: FAYE Barry on " 03/30/2022:    Data reviewed: Radiologic studies CT of the chest, and abdomen with contrast demonstrates no mediastinal mass or adenopathy.  There is a stable 18 mm infracarinal lymph node.  Persistent pleural thickening with trace effusion is present on the right side with some stable appearing atelectasis to the right of the gastric pull-through.  Very subtle atelectasis is present at the base of the left lower lobe.  Abdominal organs have a satisfactory appearance.  There are some calculi within the lower pole of the left kidney with mild thinning of the left renal cortex.  No masses within the chest or abdomen.          Assessment and Plan    Diagnoses and all orders for this visit:    1. Adenocarcinoma of esophagus (HCC) (Primary)  -     CT Chest With Contrast; Future  -     CT abdomen w contrast; Future    Mr. Hurtado has been doing well.  He does have some issues with arthritis and I recommend he try to avoid NSAIDs but take Tylenol as needed.  Recommend he follow-up with his PCP if the problem worsens.  CT of the chest and abdomen demonstrates no evidence of new, recurrent or metastatic disease within the chest or abdomen.  The patient denies any issues with swallowing, vomiting or GI upset.  We will continue surveillance with another CT of the chest and abdomen and follow-up in our office in 1 year.  We will arrange for him to see Dr. Capone in follow-up at that time.  Thank you for allowing us to participate in the care of Jose Hurtado.  We will continue to keep you informed of his progress.    I spent 24 minutes caring for Jose on this date of service. This time includes time spent by me in the following activities:preparing for the visit, reviewing tests, obtaining and/or reviewing a separately obtained history, performing a medically appropriate examination and/or evaluation , counseling and educating the patient/family/caregiver, ordering medications, tests, or procedures, referring and communicating  with other health care professionals , documenting information in the medical record, independently interpreting results and communicating that information with the patient/family/caregiver and care coordination  Follow Up   Return in about 1 year (around 3/30/2023) for Next scheduled follow up with CT chest/abdomen..  Patient was given instructions and counseling regarding his condition or for health maintenance advice. Please see specific information pulled into the AVS if appropriate.

## 2022-05-02 RX ORDER — APIXABAN 5 MG/1
TABLET, FILM COATED ORAL
Qty: 60 TABLET | Refills: 1 | Status: SHIPPED | OUTPATIENT
Start: 2022-05-02 | End: 2022-05-15 | Stop reason: HOSPADM

## 2022-05-12 ENCOUNTER — APPOINTMENT (OUTPATIENT)
Dept: GENERAL RADIOLOGY | Facility: HOSPITAL | Age: 72
End: 2022-05-12

## 2022-05-12 ENCOUNTER — APPOINTMENT (OUTPATIENT)
Dept: CT IMAGING | Facility: HOSPITAL | Age: 72
End: 2022-05-12

## 2022-05-12 ENCOUNTER — HOSPITAL ENCOUNTER (OUTPATIENT)
Facility: HOSPITAL | Age: 72
Discharge: HOME | End: 2022-05-15
Attending: EMERGENCY MEDICINE | Admitting: STUDENT IN AN ORGANIZED HEALTH CARE EDUCATION/TRAINING PROGRAM

## 2022-05-12 DIAGNOSIS — Z85.01 HISTORY OF ESOPHAGEAL CANCER: ICD-10-CM

## 2022-05-12 DIAGNOSIS — I21.4 NSTEMI (NON-ST ELEVATED MYOCARDIAL INFARCTION): Primary | ICD-10-CM

## 2022-05-12 DIAGNOSIS — J44.9 CHRONIC OBSTRUCTIVE PULMONARY DISEASE, UNSPECIFIED COPD TYPE: ICD-10-CM

## 2022-05-12 DIAGNOSIS — Z74.09 DECREASED MOBILITY AND ENDURANCE: ICD-10-CM

## 2022-05-12 LAB
ALBUMIN SERPL-MCNC: 3.9 G/DL (ref 3.5–5.2)
ALBUMIN/GLOB SERPL: 1.2 G/DL
ALP SERPL-CCNC: 113 U/L (ref 39–117)
ALT SERPL W P-5'-P-CCNC: 19 U/L (ref 1–41)
ANION GAP SERPL CALCULATED.3IONS-SCNC: 10 MMOL/L (ref 5–15)
AST SERPL-CCNC: 25 U/L (ref 1–40)
B PARAPERT DNA SPEC QL NAA+PROBE: NOT DETECTED
B PERT DNA SPEC QL NAA+PROBE: NOT DETECTED
BASOPHILS # BLD AUTO: 0.01 10*3/MM3 (ref 0–0.2)
BASOPHILS NFR BLD AUTO: 0.2 % (ref 0–1.5)
BILIRUB SERPL-MCNC: 0.5 MG/DL (ref 0–1.2)
BUN SERPL-MCNC: 19 MG/DL (ref 8–23)
BUN/CREAT SERPL: 14.8 (ref 7–25)
C PNEUM DNA NPH QL NAA+NON-PROBE: NOT DETECTED
CALCIUM SPEC-SCNC: 8.9 MG/DL (ref 8.6–10.5)
CHLORIDE SERPL-SCNC: 106 MMOL/L (ref 98–107)
CO2 SERPL-SCNC: 24 MMOL/L (ref 22–29)
CREAT SERPL-MCNC: 1.28 MG/DL (ref 0.76–1.27)
DEPRECATED RDW RBC AUTO: 43.7 FL (ref 37–54)
EGFRCR SERPLBLD CKD-EPI 2021: 58.7 ML/MIN/1.73
EOSINOPHIL # BLD AUTO: 0.03 10*3/MM3 (ref 0–0.4)
EOSINOPHIL NFR BLD AUTO: 0.6 % (ref 0.3–6.2)
ERYTHROCYTE [DISTWIDTH] IN BLOOD BY AUTOMATED COUNT: 13.7 % (ref 12.3–15.4)
FLUAV SUBTYP SPEC NAA+PROBE: NOT DETECTED
FLUBV RNA ISLT QL NAA+PROBE: NOT DETECTED
GLOBULIN UR ELPH-MCNC: 3.3 GM/DL
GLUCOSE SERPL-MCNC: 112 MG/DL (ref 65–99)
HADV DNA SPEC NAA+PROBE: NOT DETECTED
HCOV 229E RNA SPEC QL NAA+PROBE: NOT DETECTED
HCOV HKU1 RNA SPEC QL NAA+PROBE: NOT DETECTED
HCOV NL63 RNA SPEC QL NAA+PROBE: NOT DETECTED
HCOV OC43 RNA SPEC QL NAA+PROBE: NOT DETECTED
HCT VFR BLD AUTO: 37.3 % (ref 37.5–51)
HGB BLD-MCNC: 12.8 G/DL (ref 13–17.7)
HMPV RNA NPH QL NAA+NON-PROBE: NOT DETECTED
HOLD SPECIMEN: NORMAL
HOLD SPECIMEN: NORMAL
HPIV1 RNA ISLT QL NAA+PROBE: NOT DETECTED
HPIV2 RNA SPEC QL NAA+PROBE: NOT DETECTED
HPIV3 RNA NPH QL NAA+PROBE: NOT DETECTED
HPIV4 P GENE NPH QL NAA+PROBE: NOT DETECTED
IMM GRANULOCYTES # BLD AUTO: 0.02 10*3/MM3 (ref 0–0.05)
IMM GRANULOCYTES NFR BLD AUTO: 0.4 % (ref 0–0.5)
LYMPHOCYTES # BLD AUTO: 0.78 10*3/MM3 (ref 0.7–3.1)
LYMPHOCYTES NFR BLD AUTO: 15.6 % (ref 19.6–45.3)
M PNEUMO IGG SER IA-ACNC: NOT DETECTED
MCH RBC QN AUTO: 30.3 PG (ref 26.6–33)
MCHC RBC AUTO-ENTMCNC: 34.3 G/DL (ref 31.5–35.7)
MCV RBC AUTO: 88.4 FL (ref 79–97)
MONOCYTES # BLD AUTO: 0.31 10*3/MM3 (ref 0.1–0.9)
MONOCYTES NFR BLD AUTO: 6.2 % (ref 5–12)
NEUTROPHILS NFR BLD AUTO: 3.86 10*3/MM3 (ref 1.7–7)
NEUTROPHILS NFR BLD AUTO: 77 % (ref 42.7–76)
NRBC BLD AUTO-RTO: 0 /100 WBC (ref 0–0.2)
NT-PROBNP SERPL-MCNC: 420 PG/ML (ref 0–900)
PLATELET # BLD AUTO: 157 10*3/MM3 (ref 140–450)
PMV BLD AUTO: 9 FL (ref 6–12)
POTASSIUM SERPL-SCNC: 4.1 MMOL/L (ref 3.5–5.2)
PROCALCITONIN SERPL-MCNC: 0.3 NG/ML (ref 0–0.25)
PROT SERPL-MCNC: 7.2 G/DL (ref 6–8.5)
QT INTERVAL: 400 MS
RBC # BLD AUTO: 4.22 10*6/MM3 (ref 4.14–5.8)
RHINOVIRUS RNA SPEC NAA+PROBE: NOT DETECTED
RSV RNA NPH QL NAA+NON-PROBE: NOT DETECTED
SARS-COV-2 RNA NPH QL NAA+NON-PROBE: NOT DETECTED
SODIUM SERPL-SCNC: 140 MMOL/L (ref 136–145)
TROPONIN T SERPL-MCNC: 0.09 NG/ML (ref 0–0.03)
TROPONIN T SERPL-MCNC: 0.09 NG/ML (ref 0–0.03)
WBC NRBC COR # BLD: 5.01 10*3/MM3 (ref 3.4–10.8)
WHOLE BLOOD HOLD COAG: NORMAL
WHOLE BLOOD HOLD SPECIMEN: NORMAL

## 2022-05-12 PROCEDURE — 93010 ELECTROCARDIOGRAM REPORT: CPT | Performed by: INTERNAL MEDICINE

## 2022-05-12 PROCEDURE — 87641 MR-STAPH DNA AMP PROBE: CPT | Performed by: STUDENT IN AN ORGANIZED HEALTH CARE EDUCATION/TRAINING PROGRAM

## 2022-05-12 PROCEDURE — 0 IOPAMIDOL PER 1 ML: Performed by: EMERGENCY MEDICINE

## 2022-05-12 PROCEDURE — 63710000001 ONDANSETRON ODT 4 MG TABLET DISPERSIBLE: Performed by: NURSE PRACTITIONER

## 2022-05-12 PROCEDURE — 96372 THER/PROPH/DIAG INJ SC/IM: CPT

## 2022-05-12 PROCEDURE — 83880 ASSAY OF NATRIURETIC PEPTIDE: CPT | Performed by: NURSE PRACTITIONER

## 2022-05-12 PROCEDURE — 93005 ELECTROCARDIOGRAM TRACING: CPT | Performed by: NURSE PRACTITIONER

## 2022-05-12 PROCEDURE — 99285 EMERGENCY DEPT VISIT HI MDM: CPT

## 2022-05-12 PROCEDURE — 71046 X-RAY EXAM CHEST 2 VIEWS: CPT

## 2022-05-12 PROCEDURE — 71045 X-RAY EXAM CHEST 1 VIEW: CPT

## 2022-05-12 PROCEDURE — 0202U NFCT DS 22 TRGT SARS-COV-2: CPT | Performed by: NURSE PRACTITIONER

## 2022-05-12 PROCEDURE — 25010000002 ENOXAPARIN PER 10 MG: Performed by: STUDENT IN AN ORGANIZED HEALTH CARE EDUCATION/TRAINING PROGRAM

## 2022-05-12 PROCEDURE — 84484 ASSAY OF TROPONIN QUANT: CPT | Performed by: STUDENT IN AN ORGANIZED HEALTH CARE EDUCATION/TRAINING PROGRAM

## 2022-05-12 PROCEDURE — 96365 THER/PROPH/DIAG IV INF INIT: CPT

## 2022-05-12 PROCEDURE — 80053 COMPREHEN METABOLIC PANEL: CPT

## 2022-05-12 PROCEDURE — G0378 HOSPITAL OBSERVATION PER HR: HCPCS

## 2022-05-12 PROCEDURE — 85025 COMPLETE CBC W/AUTO DIFF WBC: CPT

## 2022-05-12 PROCEDURE — 71275 CT ANGIOGRAPHY CHEST: CPT

## 2022-05-12 PROCEDURE — 74177 CT ABD & PELVIS W/CONTRAST: CPT

## 2022-05-12 PROCEDURE — 87880 STREP A ASSAY W/OPTIC: CPT | Performed by: STUDENT IN AN ORGANIZED HEALTH CARE EDUCATION/TRAINING PROGRAM

## 2022-05-12 PROCEDURE — 87081 CULTURE SCREEN ONLY: CPT | Performed by: STUDENT IN AN ORGANIZED HEALTH CARE EDUCATION/TRAINING PROGRAM

## 2022-05-12 PROCEDURE — 84145 PROCALCITONIN (PCT): CPT | Performed by: STUDENT IN AN ORGANIZED HEALTH CARE EDUCATION/TRAINING PROGRAM

## 2022-05-12 PROCEDURE — 25010000002 CEFTRIAXONE PER 250 MG: Performed by: STUDENT IN AN ORGANIZED HEALTH CARE EDUCATION/TRAINING PROGRAM

## 2022-05-12 PROCEDURE — 84484 ASSAY OF TROPONIN QUANT: CPT | Performed by: NURSE PRACTITIONER

## 2022-05-12 RX ORDER — IPRATROPIUM BROMIDE AND ALBUTEROL SULFATE 2.5; .5 MG/3ML; MG/3ML
3 SOLUTION RESPIRATORY (INHALATION) EVERY 6 HOURS PRN
Status: DISCONTINUED | OUTPATIENT
Start: 2022-05-12 | End: 2022-05-15 | Stop reason: HOSPADM

## 2022-05-12 RX ORDER — SODIUM CHLORIDE 0.9 % (FLUSH) 0.9 %
10 SYRINGE (ML) INJECTION AS NEEDED
Status: DISCONTINUED | OUTPATIENT
Start: 2022-05-12 | End: 2022-05-15 | Stop reason: HOSPADM

## 2022-05-12 RX ORDER — ONDANSETRON 4 MG/1
4 TABLET, FILM COATED ORAL EVERY 6 HOURS PRN
Status: DISCONTINUED | OUTPATIENT
Start: 2022-05-12 | End: 2022-05-14 | Stop reason: SDUPTHER

## 2022-05-12 RX ORDER — ACETAMINOPHEN 325 MG/1
650 TABLET ORAL EVERY 4 HOURS PRN
Status: DISCONTINUED | OUTPATIENT
Start: 2022-05-12 | End: 2022-05-14

## 2022-05-12 RX ORDER — NITROGLYCERIN 0.4 MG/1
0.4 TABLET SUBLINGUAL
Status: DISCONTINUED | OUTPATIENT
Start: 2022-05-12 | End: 2022-05-15 | Stop reason: HOSPADM

## 2022-05-12 RX ORDER — UREA 10 %
3 LOTION (ML) TOPICAL NIGHTLY PRN
Status: DISCONTINUED | OUTPATIENT
Start: 2022-05-12 | End: 2022-05-15 | Stop reason: HOSPADM

## 2022-05-12 RX ORDER — ONDANSETRON 4 MG/1
4 TABLET, ORALLY DISINTEGRATING ORAL ONCE
Status: COMPLETED | OUTPATIENT
Start: 2022-05-12 | End: 2022-05-12

## 2022-05-12 RX ORDER — ASPIRIN 81 MG/1
324 TABLET, CHEWABLE ORAL ONCE
Status: COMPLETED | OUTPATIENT
Start: 2022-05-12 | End: 2022-05-12

## 2022-05-12 RX ORDER — PRAMIPEXOLE DIHYDROCHLORIDE 1.5 MG/1
3 TABLET ORAL NIGHTLY
Status: DISCONTINUED | OUTPATIENT
Start: 2022-05-12 | End: 2022-05-15 | Stop reason: HOSPADM

## 2022-05-12 RX ORDER — FUROSEMIDE 20 MG/1
20 TABLET ORAL DAILY
Status: DISCONTINUED | OUTPATIENT
Start: 2022-05-13 | End: 2022-05-15

## 2022-05-12 RX ORDER — ONDANSETRON 2 MG/ML
4 INJECTION INTRAMUSCULAR; INTRAVENOUS EVERY 6 HOURS PRN
Status: DISCONTINUED | OUTPATIENT
Start: 2022-05-12 | End: 2022-05-14 | Stop reason: SDUPTHER

## 2022-05-12 RX ORDER — ENOXAPARIN SODIUM 100 MG/ML
1 INJECTION SUBCUTANEOUS EVERY 12 HOURS
Status: DISCONTINUED | OUTPATIENT
Start: 2022-05-12 | End: 2022-05-13

## 2022-05-12 RX ORDER — PAROXETINE HYDROCHLORIDE 20 MG/1
40 TABLET, FILM COATED ORAL EVERY MORNING
Status: DISCONTINUED | OUTPATIENT
Start: 2022-05-13 | End: 2022-05-15 | Stop reason: HOSPADM

## 2022-05-12 RX ORDER — POTASSIUM CHLORIDE 750 MG/1
10 TABLET, FILM COATED, EXTENDED RELEASE ORAL DAILY
Status: DISCONTINUED | OUTPATIENT
Start: 2022-05-13 | End: 2022-05-15 | Stop reason: HOSPADM

## 2022-05-12 RX ORDER — PANTOPRAZOLE SODIUM 40 MG/1
40 TABLET, DELAYED RELEASE ORAL
Status: DISCONTINUED | OUTPATIENT
Start: 2022-05-13 | End: 2022-05-15 | Stop reason: HOSPADM

## 2022-05-12 RX ADMIN — Medication 3 MG: at 23:51

## 2022-05-12 RX ADMIN — METOPROLOL TARTRATE 25 MG: 25 TABLET, FILM COATED ORAL at 23:08

## 2022-05-12 RX ADMIN — PRAMIPEXOLE DIHYDROCHLORIDE 3 MG: 1.5 TABLET ORAL at 23:08

## 2022-05-12 RX ADMIN — ONDANSETRON 4 MG: 4 TABLET, ORALLY DISINTEGRATING ORAL at 16:26

## 2022-05-12 RX ADMIN — IOPAMIDOL 100 ML: 755 INJECTION, SOLUTION INTRAVENOUS at 16:41

## 2022-05-12 RX ADMIN — ASPIRIN 324 MG: 81 TABLET, CHEWABLE ORAL at 17:59

## 2022-05-12 RX ADMIN — ENOXAPARIN SODIUM 90 MG: 100 INJECTION SUBCUTANEOUS at 23:08

## 2022-05-12 RX ADMIN — CEFTRIAXONE 1 G: 1 INJECTION, POWDER, FOR SOLUTION INTRAMUSCULAR; INTRAVENOUS at 23:11

## 2022-05-12 RX ADMIN — NITROGLYCERIN 0.4 MG: 0.4 TABLET SUBLINGUAL at 17:59

## 2022-05-12 NOTE — ED TRIAGE NOTES
Pt was brought in by ems from home for abd pain and soa that started 0300 today. Reports n/v/d    Pt wearing mask on arrival. Staff wearing mask and goggles at time of triage.

## 2022-05-12 NOTE — ED PROVIDER NOTES
The ALCIDES and I have discussed this patient's history, physical exam and treatment plan.  I provided a substantive portion of the care of this patient.  I have reviewed the documentation and personally had a face to face interaction with the patient and personally performed the physical exam, in its entirety.  I affirm the documentation and agree with the treatment and plan.  The following describes my personal findings.      The patient presents complaining of retching, nausea, burning in chest with shortness of air onset 9 AM this morning.  Patient reports symptoms are improved at this time, denies shortness of air.    Comprehensive Physical exam:  Patient is nontoxic appearing oriented, conversant awake, alert  HEENT: normocephalic, atraumatic  Neck: No JVD no goiter, no pain with ROM  Pulmonary: Nontachypneic, breath sounds are well bilaterally  cardiovascular: Nontachycardic, radial pulses palpable bilaterally  Abdomen: Soft, nontender  musculoskeletal: Good range of motion, pulse, sensation x4  Neuro/psychiatric:calm, appropriate, cooperative  Skin:warm, dry    EKG          EKG time: 1657  Rhythm/Rate: Sinus rhythm, rate in the 80s  P waves and [place]: Left atrial enlargement, normal HERIBERTO's  QRS, axis: Unremarkable  ST and T waves: Nonspecific ST/T wave findings somewhat limited by baseline artifact suspected to be interference from bladder stimulator    Interpreted Contemporaneously by me, independently viewed  Minimally changed compared to prior 5/11/2021, no artifact    Given patient's history and constellation of symptoms, non-STEMI is within the realm of consideration, consult placed to cardiology, Dr. chahal is patient's cardiologist for admission for further testing, treatment as needed.    Patient was wearing facemask when I entered the room and throughout our encounter. Full protective equipment was worn throughout this patient encounter including a face mask, eye protection and gloves. Hand hygiene was  performed before donning protective equipment and after removal when leaving the room.           Cayla Hernandez MD  05/12/22 [id number]

## 2022-05-12 NOTE — ED PROVIDER NOTES
"EMERGENCY DEPARTMENT ENCOUNTER    Room Number:  03/03  Date seen:  5/12/2022  Time seen: 15:57 EDT  PCP: Brandin Bolton MD  Historian: pt, wife    HPI:  Chief complaint:cough, mild soa and abdominal pain  A complete HPI/ROS/PMH/PSH/SH/FH are unobtainable due to: n/a  Context:Jose Hurtado is a 72 y.o. male past medical history significant for esophageal cancer status post esophagectomy 5 years ago, DVT, colon cancer, gastroparesis, hypertension and anemia who presents to the ED with c/o acute onset this morning of \"retching\" followed by coughing and then a mild shortness of breath.  He also endorses some mid to lower abdominal pain.  His symptoms are not made better or worse by anything.  Due to his history of esophagus cancer he does not really vomit anymore but he is feeling mildly nauseated.  He does have a report to me a history of aspiration pneumonia.  He denies any fever, chills, problems urinating. His Oncologist is Dr. Phelan.     Patient was placed in face mask in first look. Patient was wearing facemask when I entered the room and throughout our encounter. I wore full protective equipment throughout this patient encounter including a N95 face mask, eye shield and gloves. Hand hygiene/washing of hands was performed before donning protective equipment and after removal when leaving the room.    MEDICAL RECORD REVIEW    ALLERGIES  Patient has no known allergies.    PAST MEDICAL HISTORY  Active Ambulatory Problems     Diagnosis Date Noted   • Adenocarcinoma of esophagus (HCC) 02/11/2016   • Adenomatous polyp of colon 02/11/2016   • Malignant neoplasm of prostate (HCC) 02/11/2016   • RLS (restless legs syndrome) 02/11/2016   • History of DVT (deep vein thrombosis) 03/22/2016   • Recurrent major depressive disorder, in partial remission (HCC) 01/26/2017   • Hypertension 04/12/2018   • Anemia 04/18/2019   • Insomnia due to other mental disorder 06/06/2019   • Peripheral polyneuropathy 06/06/2019   • B12 deficiency " 06/07/2019   • Postsurgical malabsorption 10/25/2019   • Lymphedema 01/14/2020   • Iron deficiency 02/10/2020   • Gastroparesis 02/15/2020   • Acute deep vein thrombosis (DVT) of popliteal vein of left lower extremity (Former[facility]) 03/24/2020   • Tremor of both hands 07/27/2020   • Gastrointestinal hemorrhage 12/15/2020   • Acute blood loss anemia 12/16/2020   • Pancytopenia (Former[facility]) 12/16/2020   • Chronic venous hypertension due to DVT 12/16/2020   • COPD (chronic obstructive pulmonary disease) (Former[facility]) 12/16/2020   • Bleeding hemorrhoid 12/17/2020   • Malabsorption of iron 01/05/2021   • Iron deficiency anemia 01/05/2021   • Pleuritic chest pain 05/11/2021   • History of esophageal cancer 05/11/2021   • Abnormal CT scan 05/11/2021   • Generalized weakness 05/11/2021   • Chronic anticoagulation 05/11/2021   • Urinary tract infection due to extended-spectrum beta lactamase (ESBL) producing Escherichia coli 05/11/2021   • CKD (chronic kidney disease) stage 2, GFR 60-89 ml/min 05/12/2021   • Dysphagia 11/10/2021   • PVC's (premature ventricular contractions) 03/16/2022     Resolved Ambulatory Problems     Diagnosis Date Noted   • Dysphagia 02/11/2016   • Duodenal ulcer 02/11/2016   • Decreased body weight 02/11/2016   • Cough    • Hyperkalemia 02/15/2016   • DVT (deep venous thrombosis) (Former[facility]) 08/24/2017   • Other hyperlipidemia 01/30/2018   • Anti-phospholipid syndrome (Former[facility]) 05/10/2018   • Pneumonia of right lower lobe due to infectious organism 02/10/2020     Past Medical History:   Diagnosis Date   • Bladder disorder    • Bleeding hemorrhoids    • Community acquired pneumonia    • Deep venous thrombosis (HCC) 2006, 2008   • Depression    • Esophageal carcinoma (Former[facility]) 12/31/2014   • Hemorrhoids    • HH (hiatus hernia)    • History of atrial fibrillation 2015   • History of kidney stones    • History of nephrolithiasis    • History of pancreatitis    • History of radiation therapy    • History of transfusion    • Long-term (current)  use of anticoagulants, INR goal 2.0-3.0    • Restless legs syndrome    • Sleep apnea    • Squamous carcinoma        PAST SURGICAL HISTORY  Past Surgical History:   Procedure Laterality Date   • APPENDECTOMY  1950   • BRONCHOSCOPY      (Diagnostic)   • CATARACT EXTRACTION Bilateral 2014   • COLONOSCOPY  12/15/2014    Complete / Description: EH, IH, torts, stool, follow-up colonoscopy due in 5 years.   • COLONOSCOPY N/A 6/13/2017    non-thrombosed external hemorrhoids, normal examined ileum, IH   • COLONOSCOPY N/A 2/26/2019    Procedure: COLONOSCOPY with Cold Polypectomy;  Surgeon: Mulugeta Millan MD;  Location: [facility] ENDOSCOPY;  Service: Gastroenterology   • COLONOSCOPY N/A 12/16/2020    Procedure: COLONOSCOPY to cecum into TI;  Surgeon: Mesha Sanchez MD;  [facility];  Service: Gastroenterology;  Laterality: N/A;  pre: lower GI bleed  post: hemorrhoids    • CYSTOSCOPY W/ LASER LITHOTRIPSY     • ENDOSCOPY N/A 6/13/2017    Procedure: ESOPHAGOGASTRODUODENOSCOPY WITH COLD BIOPSY;  Surgeon: Lake Gonzalez MD;  Location: [facility] ENDOSCOPY;  Service:    • ENDOSCOPY N/A 11/18/2021    Procedure: ESOPHAGOGASTRODUODENOSCOPY WITH  DILATATION WITH FLUOROSCOPY;  Surgeon: Jose Christine III, MD;  Location: [facility] ENDOSCOPY;  Service: Gastroenterology;  Laterality: N/A;  Pre: dysphagia, h/x of adinocarcinoma of esophagus  Post: same   • ESOPHAGECTOMY      April 2015, stage IIB esophageal carcinoma, sub-total resection.   • ESOPHAGECTOMY      Esophagectomy Subtotal Andrea Joe Procedure   • EXCISION LESION  08/2012    Removal of Squamous Cell CA on Head   • HAMMER TOE REPAIR  09/2014    Hammertoe Operation (Each Toe), 10/2014   • HAMMER TOE REPAIR Left 10/3/2017    Procedure: Left second third and fourth distal interphalangeal joint resection with flexor tenotomy;  Surgeon: Mulugeta Lira MD;  Location: [facility] OR [facility];  Service:    • HEMORRHOIDECTOMY N/A 12/23/2020    Procedure:  HEMORRHOIDECTOMY;  Surgeon: Billy Julio Jr., MD;  Location: [facility] MAIN OR;  Service: General;  Laterality: N/A;   • HERNIA REPAIR      incisional   • JEJUNOSTOMY      Laparoscopic   • JEJUNOSTOMY      tube removal    • KNEE SURGERY Bilateral , ,    • PATELLA SURGERY Left     removed   • PILONIDAL CYST / SINUS EXCISION     • IN TOTAL KNEE ARTHROPLASTY Right 3/26/2018    Procedure: TOTAL KNEE ARTHROPLASTY;  Surgeon: Renny Solis MD;  Location: [facility] MAIN OR;  Service: Orthopedics   • PROSTATECTOMY     • PYLOROPLASTY     • SPINAL FUSION  1998    C 5,6   • TONSILLECTOMY     • UPPER GASTROINTESTINAL ENDOSCOPY  12/15/2014    LA Grade D esophagitis, Pardo's, HH, multiple duodenal ulcers   • VENTRAL/INCISIONAL HERNIA REPAIR N/A 2016    Procedure: VENTRAL/INCISIONAL HERNIA REPAIR, open, with mesh, and component separation;  Surgeon: Darren Rivas MD;  Location: [facility] MAIN OR;  Service:        FAMILY HISTORY  Family History   Problem Relation Age of Onset   • Cerebral aneurysm Mother         cerebral artery aneurysm ( age 56)   • Prostate cancer Brother 68   • Anxiety disorder Father    • Suicide Attempts Father          of suicide   • Cancer Father         bladder   • No Known Problems Brother    • Pancreatic cancer Nephew    • Colon cancer Neg Hx    • Esophageal cancer Neg Hx    • Dementia Neg Hx    • Malig Hyperthermia Neg Hx        SOCIAL HISTORY  Social History     Socioeconomic History   • Marital status:      Spouse name: Lena   • Number of children: 0   • Years of education: College   Tobacco Use   • Smoking status: Former Smoker     Packs/day: 2.00     Years: 3.00     Pack years: 6.00     Types: Cigarettes     Quit date:      Years since quittin.3   • Smokeless tobacco: Former User     Types: Chew   • Tobacco comment: no caffeine    Vaping Use   • Vaping Use: Never used   Substance and Sexual Activity   • Alcohol use: Yes     Alcohol/week: 3.0  "standard drinks     Types: 1 Glasses of wine, 1 Cans of beer, 1 Shots of liquor per week     Comment: 1-2 drinks/week   • Drug use: Not Currently     Types: Marijuana     Comment: hx marijuana use \"a long time ago\"   • Sexual activity: Defer       REVIEW OF SYSTEMS  Review of Systems    All systems reviewed and negative except for those discussed in HPI.     PHYSICAL EXAM    ED Triage Vitals [05/12/22 1449]   Temp Heart Rate Resp BP SpO2   98.9 °F (37.2 °C) 88 24 147/67 98 %      Temp src Heart Rate Source Patient Position BP Location FiO2 (%)   -- Monitor -- -- --     Physical Exam    I have reviewed the triage vital signs and nursing notes.      GENERAL: not distressed  HENT: nares patent  EYES: no scleral icterus  NECK: no ROM limitations  CV: regular rhythm, regular rate, no murmur, no rubs, no gallups  RESPIRATORY: normal effort, CTAB  ABDOMEN: soft  : deferred  MUSCULOSKELETAL: no deformity, pt with BLE edema, ace wraps in place.   NEURO: alert, moves all extremities, follows commands  SKIN: warm, dry    HEARTSCORE    History  Highly suspicious              2    Moderately suspicious             1    Slightly or non-suspicious             0    ECG  Significant ST depression              2    Nonspecific repol disturbance            1    Normal                           0    Age  > or = 65                          2     46-65                           1    < or = 45                          0    Risk factors (hypercholesterolemia, HTN, DM, smoking, pos fam hx, obesity)                            > or = to 3 RF for atherosclerotic dx   2    1 or 2                 1    No risk factors                0    Troponin > or = 3x normal limit               2    1-3x normal limit    1    < or = Normal limit    0    Score  0 - 3 is low risk (0.9-1.7% risk of adverse cardiac event)  Score  4 - 6 is moderate risk (12-16.6% risk of adverse cardiac event)  Score  6 - 9 is high risk (50-65% risk of adverse cardiac " event)    This patient's HEART score is 5     LAB RESULTS  Recent Results (from the past 24 hour(s))   Comprehensive Metabolic Panel    Collection Time: 05/12/22  2:57 PM    Specimen: Blood   Result Value Ref Range    Glucose 112 (H) 65 - 99 mg/dL    BUN 19 8 - 23 mg/dL    Creatinine 1.28 (H) 0.76 - 1.27 mg/dL    Sodium 140 136 - 145 mmol/L    Potassium 4.1 3.5 - 5.2 mmol/L    Chloride 106 98 - 107 mmol/L    CO2 24.0 22.0 - 29.0 mmol/L    Calcium 8.9 8.6 - 10.5 mg/dL    Total Protein 7.2 6.0 - 8.5 g/dL    Albumin 3.90 3.50 - 5.20 g/dL    ALT (SGPT) 19 1 - 41 U/L    AST (SGOT) 25 1 - 40 U/L    Alkaline Phosphatase 113 39 - 117 U/L    Total Bilirubin 0.5 0.0 - 1.2 mg/dL    Globulin 3.3 gm/dL    A/G Ratio 1.2 g/dL    BUN/Creatinine Ratio 14.8 7.0 - 25.0    Anion Gap 10.0 5.0 - 15.0 mmol/L    eGFR 58.7 (L) >60.0 mL/min/1.73   CBC Auto Differential    Collection Time: 05/12/22  2:57 PM    Specimen: Blood   Result Value Ref Range    WBC 5.01 3.40 - 10.80 10*3/mm3    RBC 4.22 4.14 - 5.80 10*6/mm3    Hemoglobin 12.8 (L) 13.0 - 17.7 g/dL    Hematocrit 37.3 (L) 37.5 - 51.0 %    MCV 88.4 79.0 - 97.0 fL    MCH 30.3 26.6 - 33.0 pg    MCHC 34.3 31.5 - 35.7 g/dL    RDW 13.7 12.3 - 15.4 %    RDW-SD 43.7 37.0 - 54.0 fl    MPV 9.0 6.0 - 12.0 fL    Platelets 157 140 - 450 10*3/mm3    Neutrophil % 77.0 (H) 42.7 - 76.0 %    Lymphocyte % 15.6 (L) 19.6 - 45.3 %    Monocyte % 6.2 5.0 - 12.0 %    Eosinophil % 0.6 0.3 - 6.2 %    Basophil % 0.2 0.0 - 1.5 %    Immature Grans % 0.4 0.0 - 0.5 %    Neutrophils, Absolute 3.86 1.70 - 7.00 10*3/mm3    Lymphocytes, Absolute 0.78 0.70 - 3.10 10*3/mm3    Monocytes, Absolute 0.31 0.10 - 0.90 10*3/mm3    Eosinophils, Absolute 0.03 0.00 - 0.40 10*3/mm3    Basophils, Absolute 0.01 0.00 - 0.20 10*3/mm3    Immature Grans, Absolute 0.02 0.00 - 0.05 10*3/mm3    nRBC 0.0 0.0 - 0.2 /100 WBC   Green Top (Gel)    Collection Time: 05/12/22  2:57 PM   Result Value Ref Range    Extra Tube Hold for add-ons.     Lavender Top    Collection Time: 05/12/22  2:57 PM   Result Value Ref Range    Extra Tube hold for add-on    Gold Top - SST    Collection Time: 05/12/22  2:57 PM   Result Value Ref Range    Extra Tube Hold for add-ons.    Light Blue Top    Collection Time: 05/12/22  2:57 PM   Result Value Ref Range    Extra Tube Hold for add-ons.    BNP    Collection Time: 05/12/22  2:57 PM    Specimen: Blood   Result Value Ref Range    proBNP 420.0 0.0 - 900.0 pg/mL   Troponin    Collection Time: 05/12/22  2:57 PM    Specimen: Blood   Result Value Ref Range    Troponin T 0.085 (C) 0.000 - 0.030 ng/mL   ECG 12 Lead    Collection Time: 05/12/22  4:57 PM   Result Value Ref Range    QT Interval 400 ms   Troponin    Collection Time: 05/12/22  5:16 PM    Specimen: Arm, Right; Blood   Result Value Ref Range    Troponin T 0.094 (C) 0.000 - 0.030 ng/mL         RADIOLOGY RESULTS  XR Chest 2 View    Result Date: 5/12/2022  CHEST: 2 VIEWS  HISTORY: Cough and shortness of air.  COMPARISON: CT chest 03/22/2022, 2 view chest 08/06/2021.  FINDINGS:Heart size is enlarged. There has been previous esophagogastrectomy with gastric pull-through extending along the medial right lung base with adjacent compressive right lower lobe atelectasis. There is a small right pleural effusion and there is right pleural thickening, similar to the CT 03/22/2022. There are rib changes in the right related to previous thoracotomy. There is no evidence of pulmonary edema or pneumothorax. Left lung appears clear.      Postsurgical changes in the right where there has been thoracotomy, as well as esophagogastrectomy with gastric pull-through. Compressive atelectasis right lower lobe with right pleural effusion and pleural thickening. The findings are similar to previous chest CT 03/22/2022.  This report was finalized on 5/12/2022 4:32 PM by Dr. Andrade Boo M.D.      XR Chest 1 View    Result Date: 5/12/2022  CHEST SINGLE VIEW  HISTORY: Shortness of breath,  abdominal pain  COMPARISON: Two-view chest 05/12/2022, CT chest 03/22/2022, 2 view chest 08/06/2021.  FINDINGS: Heart size is enlarged. There has been esophagectomy with gastric pull-through and the stomach extends medial to the right lower lobe with adjacent compressive right lower lobe atelectasis. There is a small right pleural effusion and there is right-sided pleural thickening. Rib changes are present on the right related to previous right thoracotomy. There is no evidence for change compared to the 2 view chest obtained just prior to this exam. Left lung appears clear.      Postsurgical changes on the right with where there has been thoracotomy with esophagogastrectomy and gastric pull-through. There is compressive atelectasis in the right lower lobe. Right pleural effusion is present and there is right-sided pleural thickening. No evidence for change compared to the PA and lateral chest performed just previous to this exam.  This report was finalized on 5/12/2022 4:31 PM by Dr. Andrade Boo M.D.      CT Angiogram Chest, CT Abdomen Pelvis With Contrast    Result Date: 5/12/2022  CT ANGIOGRAM CHEST-, CT ABDOMEN PELVIS W CONTRAST-  CLINICAL HISTORY: Dyspnea. History of esophageal carcinoma. Status post esophagectomy.  TECHNIQUE: Spiral CT images were obtained through the chest during rapid IV injection of contrast and were reconstructed in 2 mm thick axial slices. Subsequently images of the abdomen and pelvis were obtained with IV contrast.  Radiation dose reduction techniques were utilized, including automated exposure control and exposure modulation based on body size.  COMPARISON: CT imaging of the chest abdomen and pelvis dated 03/22/2022.  FINDINGS: The main pulmonary arteries and their lobar and segmental branches are well opacified and demonstrate no filling defects. There is no CT evidence of acute pulmonary thromboembolism. The thoracic aorta was also well opacified and is unremarkable. The patient  is status post esophagectomy with gastric pull-through procedure. The pull-through appears unremarkable and unchanged. There is no mediastinal or hilar or axillary lymphadenopathy. Lung window images demonstrate multiple curvilinear areas of abnormal increased density in the inferior aspect of the right lung that are consistent with chronic fibrotic scarring. These are unchanged. There are no discrete lung masses. There is mild peribronchial thickening in both lower lung zones and also numerous reticulonodular opacities scattered throughout both lungs that are new since the preceding CT scan and are consistent with sequela of bronchitis. Minimal groundglass opacities are also noted suspicious for pneumonia, most prominent in the lingula. A tiny loculated left pleural effusion is also unchanged.  The liver, spleen and adrenal glands are unremarkable. There is a small approximately 3 mm diameter nonobstructing lower pole left renal calculus that appears essentially unchanged. The kidneys are otherwise unremarkable. The neck of the pancreas is retracted superiorly into the anterior aspect of the aortic hiatus due to the surgery. However, this is new since the preceding CT scan dated 03/20/2022. The pancreas is otherwise unremarkable. The small bowel and colon appear within normal limits. No lymphadenopathy is identified in the abdomen or pelvis. The prostate gland appears markedly atrophic or surgically absent.      There is no CT evidence of acute pulmonary thromboembolism. There is mild peribronchial thickening in both lower lung zones and also patchy reticulonodular opacities in both lower lung zones that are new since the preceding CT dated 03/20/2022 and are consistent with sequela of bronchitis. Minimal patchy groundglass infiltrate is also present, most prominent in the lingula. Postoperative changes as described. There is no evidence of recurrent esophageal carcinoma or metastatic disease within the chest  abdomen or pelvis. There is a single tiny nonobstructing lower pole left renal calculus. No acute process is evident in the abdomen or pelvis.  This report was finalized on 5/12/2022 5:30 PM by Dr. Rios Denny M.D.           PROGRESS, DATA ANALYSIS, CONSULTS AND MEDICAL DECISION MAKING  All labs have been independently reviewed by me.  All radiology studies have been reviewed by me and discussed with radiologist dictating the report.  EKG's independently viewed and interpreted by me unless stated otherwise. Discussion below represents my analysis of pertinent findings related to patient's condition, differential diagnosis, treatment plan and final disposition.     ED Course as of 05/12/22 1814   Thu May 12, 2022   1740 Discussed CTA and CT abdomen pelvis with Dr. Denny.  No PE.  He does have some bronchitis.  No acute findings in abdomen/pelvis.  I will consult Cardiology.    [EW]   1749 EKG          EKG time: 1657  Rhythm/Rate: 83, sinus rhythm, major artifact despite multiple tries  P waves and CT: Prolonged HERIBERTO  QRS, axis: Normal QRS, normal axis  ST and T waves: Borderline T abnormalities inferior leads    Interpreted Contemporaneously by me, independently viewed  Similar to prior dated 5/11/2021   [EW]   1751 Troponin T(!!): 0.094  Has elevated.  Pt states his chest tightness is 3/10.  I will order a dose of NTG [EW]   1800 Discussed admission with Dr. Maldonado [facility].  He agrees with admission for further cardiac workup but he would like to be consulting service.  I will consult [facility] for primary admission.  I did discuss the patient's elevated troponin x 2 with Dr. Maldonado.  [EW]   1807 Discussed admission with Dr. Henley  [facility] and he agrees to admit.  [EW]      ED Course User Index  [EW] Renay Gonzalez, FAYE     DDX: shortness of breath, aspiration, pneumonia, chest pain    MDM:  Pt with no EKG changes.  He does have 2 elevated troponins which were discussed with [facility] cardiology.  He will be  admitted for further work-up.  Chest CTA is negative for PE.  His other labs are unremarkable.  Did give a dose of aspirin and try a dose of nitro in the ER.    Reviewed pt's history and workup with Dr. Hernandez.  After a bedside evaluation, Dr. Hernandez agrees with the plan of care.    Based on the patient's lab findings and presenting symptoms, the doctor and I feel it is appropriate to admit the patient for further management, evaluation, and treatment.  I have discussed this with the admitting team.  I have also discussed this with the patient/family.  They are in agreement with admission.          Disposition vitals:  /69   Pulse 81   Temp 98.9 °F (37.2 °C)   Resp 22   SpO2 95%       DIAGNOSIS  Final diagnoses:   NSTEMI (non-ST elevated myocardial infarction) (HCC)   Chronic obstructive pulmonary disease, unspecified COPD type (HCC)   History of esophageal cancer       Admission     Renay Gonzalez, FAYE  05/12/22 1819       Renay Gonzalez, FAYE  05/12/22 1821

## 2022-05-12 NOTE — H&P
Patient Name:  Jose Hurtado  YOB: 1950  MRN:  [id number]  Admit Date:  5/12/2022  Patient Care Team:  Brandin Bolton MD as PCP - General (Internal Medicine)  Code, George THORPE II, MD as Consulting Physician (Hematology and Oncology)  Jose Inman MD as Consulting Physician (Urology)  Mulugeta Millan MD as Consulting Physician (Gastroenterology)  Jose Christine III, MD as Referring Physician (Thoracic Surgery)  Seth Randhawa MD as Consulting Physician (Ophthalmology)  James Cyr MD as Consulting Physician (Cardiology)      Subjective   History Present Illness     Chief Complaint   Patient presents with   • Abdominal Pain       Mr. Hurtado is a 72 y.o. former smoker CKD2-3, history of esophageal cancer s/p esophagectomy with gastric pull through in 2015 and chemo/radiation in remission, COPD, hypertension, history of recurrent DVT on eliquis, iron deficiency anemia, PVCs, RLS, depression who presented with cough associated with retching, shortness of breath, some abdominal pain that started today.  He denies chest pain, fevers, sweats, chills, sore throat, headaches, muscle pain.  He reports the cough is nonproductive.  He underwent extensive work-up in the emergency department including a CTA chest and a CT abdomen pelvis, which showed some evidence of bronchitis and some groundglass infiltrate in the lingula.  Ultimately he was found to have above his baseline elevated troponin, and so is being admitted for this.     History of Present Illness  Review of Systems   Constitutional: Negative for chills, fatigue and fever.   HENT: Negative for postnasal drip, sinus pressure, sinus pain, sneezing and sore throat.    Eyes: Negative.    Respiratory: Positive for cough and shortness of breath. Negative for wheezing.    Cardiovascular: Positive for leg swelling. Negative for chest pain and palpitations.   Gastrointestinal: Positive for abdominal pain and vomiting. Negative for  abdominal distention, constipation, diarrhea and nausea.   Endocrine: Negative for polydipsia and polyuria.   Genitourinary: Negative for dysuria, frequency and urgency.   Musculoskeletal: Negative for arthralgias and myalgias.   Skin: Negative for rash and wound.   Allergic/Immunologic: Negative.    Neurological: Negative for light-headedness and headaches.   Hematological: Negative.    Psychiatric/Behavioral: Negative for confusion and decreased concentration.        Personal History     Past Medical History:   Diagnosis Date   • Acute deep vein thrombosis (DVT) of popliteal vein of left lower extremity (HCC) 03/24/2020   • Anemia    • Anti-phospholipid syndrome (HCC)     On lifelong anticoagulation therapy   • Bladder disorder     LEAKAGE   ON MED  wers pads   • Bleeding hemorrhoids    • Community acquired pneumonia     HISTORY OF IN 2014   • Deep venous thrombosis (HCC) 2006, 2008    Left lower extremity multiple   • Depression    • Esophageal carcinoma (HCC) 12/31/2014    had chemo and radiation prior surgery   • Hemorrhoids    • HH (hiatus hernia)    • History of atrial fibrillation 2015    ONE EPISODE WHILE HOSPITALIZED   • History of kidney stones    • History of nephrolithiasis    • History of pancreatitis     PT STATES MANY YEARS AGO   • History of radiation therapy    • History of transfusion    • Hypertension    • Long-term (current) use of anticoagulants, INR goal 2.0-3.0    • Lymphedema    • Malignant neoplasm of prostate (HCC)    • Other hyperlipidemia 1/30/2018 January 30, 2018 lipid panel risk 12.8%   • Restless legs syndrome    • Sleep apnea     OCCASIONALLY WEARS CPAP   • Squamous carcinoma     on the head     Past Surgical History:   Procedure Laterality Date   • APPENDECTOMY  1950   • BRONCHOSCOPY      (Diagnostic)   • CATARACT EXTRACTION Bilateral 2014   • COLONOSCOPY  12/15/2014    Complete / Description: EH, IH, torts, stool, follow-up colonoscopy due in 5 years.   • COLONOSCOPY N/A  6/13/2017    non-thrombosed external hemorrhoids, normal examined ileum, IH   • COLONOSCOPY N/A 2/26/2019    Procedure: COLONOSCOPY with Cold Polypectomy;  Surgeon: Mulugeta Millan MD;  Location[facility] END[facility]COPY;  Service: Gastroenterology   • COLONOSCOPY N/A 12/16/2020    Procedure: COLONOSCOPY to cecum into TI;  Surgeon: Mesha Sanchez MD;  Location[facility] END[facility];  Service: Gastroenterology;  Laterality: N/A;  pre: lower GI bleed  post: hemorrhoids    • CYSTOSCOPY W/ LASER LITHOTRIPSY     • ENDOSCOPY N/A 6/13/2017    Procedure: ESOPHAGOGASTRODUODENOSCOPY WITH COLD BIOPSY;  Surgeon: Lake Gonzalez MD;  Location: [facility] ENDOSCOPY;  Service:    • ENDOSCOPY N/A 11/18/2021    Procedure: ESOPHAGOGASTRODUODENOSCOPY WITH  DILATATION WITH FLUOROSCOPY;  Surgeon: Jose Christine III, MD;  Location: [facility] ENDOSCOPY;  Service: Gastroenterology;  Laterality: N/A;  Pre: dysphagia, h/x of adinocarcinoma of esophagus  Post: same   • ESOPHAGECTOMY      April 2015, stage IIB esophageal carcinoma, sub-total resection.   • ESOPHAGECTOMY      Esophagectomy Subtotal Andrea Joe Procedure   • EXCISION LESION  08/2012    Removal of Squamous Cell CA on Head   • HAMMER TOE REPAIR  09/2014    Hammertoe Operation (Each Toe), 10/2014   • HAMMER TOE REPAIR Left 10/3/2017    Procedure: Left second third and fourth distal interphalangeal joint resection with flexor tenotomy;  Surgeon: Mulugeta Lira MD;  Location: [facility] OR OSC;  Service:    • HEMORRHOIDECTOMY N/A 12/23/2020    Procedure: HEMORRHOIDECTOMY;  Surgeon: Billy Julio Jr., MD;  Location: [facility] MAIN OR;  Service: General;  Laterality: N/A;   • HERNIA REPAIR      incisional   • JEJUNOSTOMY      Laparoscopic   • JEJUNOSTOMY      tube removal    • KNEE SURGERY Bilateral 1967, 1973, 1981   • PATELLA SURGERY Left     removed   • PILONIDAL CYST / SINUS EXCISION     • OK TOTAL KNEE ARTHROPLASTY Right 3/26/2018    Procedure: TOTAL KNEE ARTHROPLASTY;  Surgeon:  "Renny Solis MD;  [facility];  Service: Orthopedics   • PROSTATECTOMY     • PYLOROPLASTY     • SPINAL FUSION  1998    C 5,6   • TONSILLECTOMY     • UPPER GASTROINTESTINAL ENDOSCOPY  12/15/2014    LA Grade D esophagitis, Pardo's, HH, multiple duodenal ulcers   • VENTRAL/INCISIONAL HERNIA REPAIR N/A 2016    Procedure: VENTRAL/INCISIONAL HERNIA REPAIR, open, with mesh, and component separation;  Surgeon: Darren Rivas MD;  Location: [facility] OR;  Service:      Family History   Problem Relation Age of Onset   • Cerebral aneurysm Mother         cerebral artery aneurysm ( age 56)   • Prostate cancer Brother 68   • Anxiety disorder Father    • Suicide Attempts Father          of suicide   • Cancer Father         bladder   • No Known Problems Brother    • Pancreatic cancer Nephew    • Colon cancer Neg Hx    • Esophageal cancer Neg Hx    • Dementia Neg Hx    • Malig Hyperthermia Neg Hx      Social History     Tobacco Use   • Smoking status: Former Smoker     Packs/day: 2.00     Years: 3.00     Pack years: 6.00     Types: Cigarettes     Quit date:      Years since quittin.3   • Smokeless tobacco: Former User     Types: Chew   • Tobacco comment: no caffeine    Vaping Use   • Vaping Use: Never used   Substance Use Topics   • Alcohol use: Yes     Alcohol/week: 3.0 standard drinks     Types: 1 Glasses of wine, 1 Cans of beer, 1 Shots of liquor per week     Comment: 1-2 drinks/week   • Drug use: Not Currently     Types: Marijuana     Comment: hx marijuana use \"a long time ago\"     No current facility-administered medications on file prior to encounter.     Current Outpatient Medications on File Prior to Encounter   Medication Sig Dispense Refill   • albuterol sulfate  (90 Base) MCG/ACT inhaler Inhale 1 puff Every 4 (Four) Hours As Needed for Wheezing.     • Cyanocobalamin 1000 MCG/ML kit Inject 1 mL as directed Every 30 (Thirty) Days. Intramuscularly. 1 kit 11   • " "Eliquis 5 MG tablet tablet TAKE ONE TABLET BY MOUTH EVERY 12 HOURS 60 tablet 1   • furosemide (LASIX) 20 MG tablet TAKE ONE TABLET BY MOUTH DAILY 30 tablet 5   • Gemtesa 75 MG tablet      • ketoconazole (NIZORAL) 2 % cream      • metoprolol tartrate (LOPRESSOR) 25 MG tablet TAKE ONE TABLET BY MOUTH TWICE A  tablet 3   • pantoprazole (PROTONIX) 40 MG EC tablet TAKE ONE TABLET BY MOUTH TWICE A DAY BEFORE A MEAL 180 tablet 3   • PARoxetine (PAXIL) 40 MG tablet TAKE ONE TABLET BY MOUTH EVERY MORNING 30 tablet 5   • potassium chloride (MICRO-K) 10 MEQ CR capsule TAKE ONE CAPSULE BY MOUTH DAILY 30 capsule 5   • pramipexole (MIRAPEX) 1.5 MG tablet TAKE ONE TABLET BY MOUTH TWICE A DAY (Patient taking differently: 3 mg Every Night. 2 tabs of 1.5 mg) 180 tablet 1   • Syringe 25G X 1\" 3 ML misc Use along with B12 . 1 ml into the appropriate side of the muscle once every 30 days. 50 each 1     No Known Allergies    Objective    Objective     Vital Signs  Temp:  [98.9 °F (37.2 °C)] 98.9 °F (37.2 °C)  Heart Rate:  [81-88] 81  Resp:  [22-24] 22  BP: (128-147)/(67-77) 131/71  SpO2:  [95 %-98 %] 95 %  on  Flow (L/min):  [2] 2;   Device (Oxygen Therapy): room air  There is no height or weight on file to calculate BMI.    Physical Exam  Constitutional:       General: He is not in acute distress.     Appearance: Normal appearance.   HENT:      Head: Normocephalic and atraumatic.      Mouth/Throat:      Mouth: Mucous membranes are moist.      Pharynx: Oropharynx is clear.   Eyes:      Extraocular Movements: Extraocular movements intact.      Pupils: Pupils are equal, round, and reactive to light.   Cardiovascular:      Rate and Rhythm: Normal rate and regular rhythm.      Heart sounds: Normal heart sounds.   Pulmonary:      Effort: Pulmonary effort is normal. No respiratory distress.      Breath sounds: Rhonchi and rales present. No wheezing.      Comments: Right lower lobe crackles/rhonchi  Abdominal:      General: Bowel sounds " are normal. There is no distension.      Palpations: Abdomen is soft.      Tenderness: There is no abdominal tenderness. There is no guarding or rebound.   Musculoskeletal:         General: No tenderness.      Cervical back: Normal range of motion and neck supple.      Right lower leg: Edema present.      Left lower leg: Edema present.   Skin:     General: Skin is warm and dry.      Findings: No rash.   Neurological:      General: No focal deficit present.      Mental Status: He is alert and oriented to person, place, and time.   Psychiatric:         Mood and Affect: Mood normal.         Behavior: Behavior normal.         Results Review:  I reviewed the patient's new clinical results.  I reviewed the patient's new imaging results and agree with the interpretation.  I reviewed the patient's other test results and agree with the interpretation  I personally viewed and interpreted the patient's EKG/Telemetry data  Discussed with ED provider.    Lab Results (last 24 hours)     Procedure Component Value Units Date/Time    CBC & Differential [[id number]]  (Abnormal) Collected: 05/12/22 1457    Specimen: Blood Updated: 05/12/22 1522    Narrative:      The following orders were created for panel order CBC & Differential.  Procedure                               Abnormality         Status                     ---------                               -----------         ------                     CBC Auto Differential[[id number]]        Abnormal            Final result                 Please view results for these tests on the individual orders.    Comprehensive Metabolic Panel [[id number]]  (Abnormal) Collected: 05/12/22 1457    Specimen: Blood Updated: 05/12/22 1549     Glucose 112 mg/dL      BUN 19 mg/dL      Creatinine 1.28 mg/dL      Sodium 140 mmol/L      Potassium 4.1 mmol/L      Chloride 106 mmol/L      CO2 24.0 mmol/L      Calcium 8.9 mg/dL      Total Protein 7.2 g/dL      Albumin 3.90 g/dL      ALT (SGPT) 19 U/L      AST  (SGOT) 25 U/L      Alkaline Phosphatase 113 U/L      Total Bilirubin 0.5 mg/dL      Globulin 3.3 gm/dL      A/G Ratio 1.2 g/dL      BUN/Creatinine Ratio 14.8     Anion Gap 10.0 mmol/L      eGFR 58.7 mL/min/1.73      Comment: National Kidney Foundation and American Society of Nephrology (ASN) Task Force recommended calculation based on the Chronic Kidney Disease Epidemiology Collaboration (CKD-EPI) equation refit without adjustment for race.       Narrative:      GFR Normal >60  Chronic Kidney Disease <60  Kidney Failure <15      CBC Auto Differential [[id number]]  (Abnormal) Collected: 05/12/22 1457    Specimen: Blood Updated: 05/12/22 1522     WBC 5.01 10*3/mm3      RBC 4.22 10*6/mm3      Hemoglobin 12.8 g/dL      Hematocrit 37.3 %      MCV 88.4 fL      MCH 30.3 pg      MCHC 34.3 g/dL      RDW 13.7 %      RDW-SD 43.7 fl      MPV 9.0 fL      Platelets 157 10*3/mm3      Neutrophil % 77.0 %      Lymphocyte % 15.6 %      Monocyte % 6.2 %      Eosinophil % 0.6 %      Basophil % 0.2 %      Immature Grans % 0.4 %      Neutrophils, Absolute 3.86 10*3/mm3      Lymphocytes, Absolute 0.78 10*3/mm3      Monocytes, Absolute 0.31 10*3/mm3      Eosinophils, Absolute 0.03 10*3/mm3      Basophils, Absolute 0.01 10*3/mm3      Immature Grans, Absolute 0.02 10*3/mm3      nRBC 0.0 /100 WBC     BNP [[id number]]  (Normal) Collected: 05/12/22 1457    Specimen: Blood Updated: 05/12/22 1702     proBNP 420.0 pg/mL     Narrative:      Among patients with dyspnea, NT-proBNP is highly sensitive for the detection of acute congestive heart failure. In addition NT-proBNP of <300 pg/ml effectively rules out acute congestive heart failure with 99% negative predictive value.    Results may be falsely decreased if patient taking Biotin.      Troponin [[id number]]  (Abnormal) Collected: 05/12/22 1457    Specimen: Blood Updated: 05/12/22 1649     Troponin T 0.085 ng/mL     Narrative:      Troponin T Reference Range:  <= 0.03 ng/mL-   Negative for  AMI  >0.03 ng/mL-     Abnormal for myocardial necrosis.  Clinicians would have to utilize clinical acumen, EKG, Troponin and serial changes to determine if it is an Acute Myocardial Infarction or myocardial injury due to an underlying chronic condition.       Results may be falsely decreased if patient taking Biotin.      Respiratory Panel PCR w/COVID-19(SARS-CoV-2) TONIE/HOWARD/CARMEN/[facility]/M[facility]/ALEXANDER In-House, NP Swab in UTM/VTM, 3-4 HR TAT - Swab, Nasopharynx [[id number]] Collected: 05/12/22 1633    Specimen: Swab from Nasopharynx Updated: 05/12/22 1733    Troponin [[id number]]  (Abnormal) Collected: 05/12/22 1716    Specimen: Blood from Arm, Right Updated: 05/12/22 1750     Troponin T 0.094 ng/mL     Narrative:      Troponin T Reference Range:  <= 0.03 ng/mL-   Negative for AMI  >0.03 ng/mL-     Abnormal for myocardial necrosis.  Clinicians would have to utilize clinical acumen, EKG, Troponin and serial changes to determine if it is an Acute Myocardial Infarction or myocardial injury due to an underlying chronic condition.       Results may be falsely decreased if patient taking Biotin.            Imaging Results (Last 24 Hours)     Procedure Component Value Units Date/Time    CT Angiogram Chest [[id number]] Collected: 05/12/22 1710     Updated: 05/12/22 1733    Narrative:      CT ANGIOGRAM CHEST-, CT ABDOMEN PELVIS W CONTRAST-     CLINICAL HISTORY: Dyspnea. History of esophageal carcinoma. Status post  esophagectomy.     TECHNIQUE: Spiral CT images were obtained through the chest during rapid  IV injection of contrast and were reconstructed in 2 mm thick axial  slices. Subsequently images of the abdomen and pelvis were obtained with  IV contrast.     Radiation dose reduction techniques were utilized, including automated  exposure control and exposure modulation based on body size.     COMPARISON: CT imaging of the chest abdomen and pelvis dated 03/22/2022.     FINDINGS: The main pulmonary arteries and their lobar and  segmental  branches are well opacified and demonstrate no filling defects. There is  no CT evidence of acute pulmonary thromboembolism. The thoracic aorta  was also well opacified and is unremarkable. The patient is status post  esophagectomy with gastric pull-through procedure. The pull-through  appears unremarkable and unchanged. There is no mediastinal or hilar or  axillary lymphadenopathy. Lung window images demonstrate multiple  curvilinear areas of abnormal increased density in the inferior aspect  of the right lung that are consistent with chronic fibrotic scarring.  These are unchanged. There are no discrete lung masses. There is mild  peribronchial thickening in both lower lung zones and also numerous  reticulonodular opacities scattered throughout both lungs that are new  since the preceding CT scan and are consistent with sequela of  bronchitis. Minimal groundglass opacities are also noted suspicious for  pneumonia, most prominent in the lingula. A tiny loculated left pleural  effusion is also unchanged.     The liver, spleen and adrenal glands are unremarkable. There is a small  approximately 3 mm diameter nonobstructing lower pole left renal  calculus that appears essentially unchanged. The kidneys are otherwise  unremarkable. The neck of the pancreas is retracted superiorly into the  anterior aspect of the aortic hiatus due to the surgery. However, this  is new since the preceding CT scan dated 03/20/2022. The pancreas is  otherwise unremarkable. The small bowel and colon appear within normal  limits. No lymphadenopathy is identified in the abdomen or pelvis. The  prostate gland appears markedly atrophic or surgically absent.       Impression:      There is no CT evidence of acute pulmonary thromboembolism.  There is mild peribronchial thickening in both lower lung zones and also  patchy reticulonodular opacities in both lower lung zones that are new  since the preceding CT dated 03/20/2022 and are  consistent with sequela  of bronchitis. Minimal patchy groundglass infiltrate is also present,  most prominent in the lingula. Postoperative changes as described. There  is no evidence of recurrent esophageal carcinoma or metastatic disease  within the chest abdomen or pelvis. There is a single tiny  nonobstructing lower pole left renal calculus. No acute process is  evident in the abdomen or pelvis.     This report was finalized on 5/12/2022 5:30 PM by Dr. Rios Denny M.D.       CT Abdomen Pelvis With Contrast [[id number]] Collected: 05/12/22 1710     Updated: 05/12/22 1733    Narrative:      CT ANGIOGRAM CHEST-, CT ABDOMEN PELVIS W CONTRAST-     CLINICAL HISTORY: Dyspnea. History of esophageal carcinoma. Status post  esophagectomy.     TECHNIQUE: Spiral CT images were obtained through the chest during rapid  IV injection of contrast and were reconstructed in 2 mm thick axial  slices. Subsequently images of the abdomen and pelvis were obtained with  IV contrast.     Radiation dose reduction techniques were utilized, including automated  exposure control and exposure modulation based on body size.     COMPARISON: CT imaging of the chest abdomen and pelvis dated 03/22/2022.     FINDINGS: The main pulmonary arteries and their lobar and segmental  branches are well opacified and demonstrate no filling defects. There is  no CT evidence of acute pulmonary thromboembolism. The thoracic aorta  was also well opacified and is unremarkable. The patient is status post  esophagectomy with gastric pull-through procedure. The pull-through  appears unremarkable and unchanged. There is no mediastinal or hilar or  axillary lymphadenopathy. Lung window images demonstrate multiple  curvilinear areas of abnormal increased density in the inferior aspect  of the right lung that are consistent with chronic fibrotic scarring.  These are unchanged. There are no discrete lung masses. There is mild  peribronchial thickening in both lower  lung zones and also numerous  reticulonodular opacities scattered throughout both lungs that are new  since the preceding CT scan and are consistent with sequela of  bronchitis. Minimal groundglass opacities are also noted suspicious for  pneumonia, most prominent in the lingula. A tiny loculated left pleural  effusion is also unchanged.     The liver, spleen and adrenal glands are unremarkable. There is a small  approximately 3 mm diameter nonobstructing lower pole left renal  calculus that appears essentially unchanged. The kidneys are otherwise  unremarkable. The neck of the pancreas is retracted superiorly into the  anterior aspect of the aortic hiatus due to the surgery. However, this  is new since the preceding CT scan dated 03/20/2022. The pancreas is  otherwise unremarkable. The small bowel and colon appear within normal  limits. No lymphadenopathy is identified in the abdomen or pelvis. The  prostate gland appears markedly atrophic or surgically absent.       Impression:      There is no CT evidence of acute pulmonary thromboembolism.  There is mild peribronchial thickening in both lower lung zones and also  patchy reticulonodular opacities in both lower lung zones that are new  since the preceding CT dated 03/20/2022 and are consistent with sequela  of bronchitis. Minimal patchy groundglass infiltrate is also present,  most prominent in the lingula. Postoperative changes as described. There  is no evidence of recurrent esophageal carcinoma or metastatic disease  within the chest abdomen or pelvis. There is a single tiny  nonobstructing lower pole left renal calculus. No acute process is  evident in the abdomen or pelvis.     This report was finalized on 5/12/2022 5:30 PM by Dr. Rios Denny M.D.       XR Chest 2 View [[id number]] Collected: 05/12/22 1607     Updated: 05/12/22 1636    Narrative:      CHEST: 2 VIEWS     HISTORY: Cough and shortness of air.     COMPARISON: CT chest 03/22/2022, 2 view chest  08/06/2021.     FINDINGS:Heart size is enlarged. There has been previous  esophagogastrectomy with gastric pull-through extending along the medial  right lung base with adjacent compressive right lower lobe atelectasis.  There is a small right pleural effusion and there is right pleural  thickening, similar to the CT 03/22/2022. There are rib changes in the  right related to previous thoracotomy. There is no evidence of pulmonary  edema or pneumothorax. Left lung appears clear.       Impression:      Postsurgical changes in the right where there has been  thoracotomy, as well as esophagogastrectomy with gastric pull-through.  Compressive atelectasis right lower lobe with right pleural effusion and  pleural thickening. The findings are similar to previous chest CT  03/22/2022.     This report was finalized on 5/12/2022 4:32 PM by Dr. Andrade Boo M.D.       XR Chest 1 View [[id number]] Collected: 05/12/22 1626     Updated: 05/12/22 1634    Narrative:      CHEST SINGLE VIEW     HISTORY: Shortness of breath, abdominal pain     COMPARISON: Two-view chest 05/12/2022, CT chest 03/22/2022, 2 view chest  08/06/2021.     FINDINGS: Heart size is enlarged. There has been esophagectomy with  gastric pull-through and the stomach extends medial to the right lower  lobe with adjacent compressive right lower lobe atelectasis. There is a  small right pleural effusion and there is right-sided pleural  thickening. Rib changes are present on the right related to previous  right thoracotomy. There is no evidence for change compared to the 2  view chest obtained just prior to this exam. Left lung appears clear.       Impression:      Postsurgical changes on the right with where there has been  thoracotomy with esophagogastrectomy and gastric pull-through. There is  compressive atelectasis in the right lower lobe. Right pleural effusion  is present and there is right-sided pleural thickening. No evidence for  change compared to the PA  and lateral chest performed just previous to  this exam.     This report was finalized on 5/12/2022 4:31 PM by Dr. Andrade Boo M.D.             Results for orders placed during the hospital encounter of 01/14/20    Adult Transthoracic Echo Complete W/ Cont if Necessary Per Protocol    Interpretation Summary  · Estimated EF = 72%.  · Left ventricular systolic function is hyperdynamic (EF > 70).  · Calculated right ventricular systolic pressure from tricuspid regurgitation is 27.4 mmHg.      ECG 12 Lead   Preliminary Result   HEART RATE= 83  bpm   RR Interval= 720  ms   DC Interval= 214  ms   P Horizontal Axis= 30  deg   P Front Axis= 39  deg   QRSD Interval= 107  ms   QT Interval= 400  ms   QRS Axis= -2  deg   T Wave Axis= 31  deg   - ABNORMAL ECG -   Sinus rhythm   Borderline prolonged DC interval   Probable left atrial enlargement   Borderline T abnormalities, inferior leads   Electronically Signed By:    Date and Time of Study: 2022-05-12 16:57:25           Assessment/Plan     Active Hospital Problems    Diagnosis  POA   • NSTEMI (non-ST elevated myocardial infarction) ([facility]) [I21.4]  Yes   • CKD (chronic kidney disease) stage 2, GFR 60-89 ml/min [N18.2]  Yes   • Chronic anticoagulation [Z79.01]  Not Applicable   • COPD (chronic obstructive pulmonary disease) ([facility]) [J44.9]  Yes   • Hypertension [I10]  Yes   • Adenocarcinoma of esophagus (HCC) [C15.9]  Yes      Resolved Hospital Problems   No resolved problems to display.       Mr. Hurtado is a 72 y.o. 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 Start ceftriaxone. Ask speech to see him to see if he's aspirating.   · NSTEMI-trend troponins and ekgs. Cardiology consult.  Echocardiogram.  · CKD2/3-creatinine is better than baseline  · history of recurrent DVT-exchange eliquis for lovenox in case any procedures need to be performed  · COPD-only on a rescue inhaler; will make duonebs available  · RLS-pramipexole  · Depression-paroxetine  · GERD-PPI  · Overactive bladder-mirbegron  · Chronic lymphedema-continue lasix and potassium  · I discussed the patient's findings and my recommendations with patient and ED provider.    VTE Prophylaxis - Therapeutic Lovenox to replace his Eliquis.  Code Status - Full code.       Jake Henley MD  [facility]  05/12/22  18:26 EDT

## 2022-05-13 ENCOUNTER — APPOINTMENT (OUTPATIENT)
Dept: CARDIOLOGY | Facility: HOSPITAL | Age: 72
End: 2022-05-13

## 2022-05-13 PROBLEM — J40 BRONCHITIS: Status: ACTIVE | Noted: 2022-05-13

## 2022-05-13 LAB
ANION GAP SERPL CALCULATED.3IONS-SCNC: 7.3 MMOL/L (ref 5–15)
BH CV ECHO MEAS - ACS: 2.3 CM
BH CV ECHO MEAS - AO MAX PG: 7.3 MMHG
BH CV ECHO MEAS - AO MEAN PG: 4 MMHG
BH CV ECHO MEAS - AO ROOT DIAM: 2.9 CM
BH CV ECHO MEAS - AO V2 MAX: 135 CM/SEC
BH CV ECHO MEAS - AO V2 VTI: 33 CM
BH CV ECHO MEAS - AVA(I,D): 3.2 CM2
BH CV ECHO MEAS - EDV(CUBED): 125 ML
BH CV ECHO MEAS - EDV(MOD-SP2): 94 ML
BH CV ECHO MEAS - EDV(MOD-SP4): 95 ML
BH CV ECHO MEAS - EF(MOD-BP): 56.3 %
BH CV ECHO MEAS - EF(MOD-SP2): 54.3 %
BH CV ECHO MEAS - EF(MOD-SP4): 56.8 %
BH CV ECHO MEAS - ESV(CUBED): 68.9 ML
BH CV ECHO MEAS - ESV(MOD-SP2): 43 ML
BH CV ECHO MEAS - ESV(MOD-SP4): 41 ML
BH CV ECHO MEAS - FS: 18 %
BH CV ECHO MEAS - IVS/LVPW: 1.09 CM
BH CV ECHO MEAS - IVSD: 1.2 CM
BH CV ECHO MEAS - LAT PEAK E' VEL: 7 CM/SEC
BH CV ECHO MEAS - LV MASS(C)D: 220.3 GRAMS
BH CV ECHO MEAS - LV MAX PG: 4.2 MMHG
BH CV ECHO MEAS - LV MEAN PG: 2 MMHG
BH CV ECHO MEAS - LV V1 MAX: 103 CM/SEC
BH CV ECHO MEAS - LV V1 VTI: 25.8 CM
BH CV ECHO MEAS - LVIDD: 5 CM
BH CV ECHO MEAS - LVIDS: 4.1 CM
BH CV ECHO MEAS - LVOT AREA: 4.2 CM2
BH CV ECHO MEAS - LVOT DIAM: 2.3 CM
BH CV ECHO MEAS - LVPWD: 1.1 CM
BH CV ECHO MEAS - MED PEAK E' VEL: 7.8 CM/SEC
BH CV ECHO MEAS - MV A DUR: 0.13 SEC
BH CV ECHO MEAS - MV A MAX VEL: 90.4 CM/SEC
BH CV ECHO MEAS - MV DEC SLOPE: 360 CM/SEC2
BH CV ECHO MEAS - MV DEC TIME: 0.27 MSEC
BH CV ECHO MEAS - MV E MAX VEL: 91.3 CM/SEC
BH CV ECHO MEAS - MV E/A: 1.01
BH CV ECHO MEAS - MV MAX PG: 3.8 MMHG
BH CV ECHO MEAS - MV MEAN PG: 2 MMHG
BH CV ECHO MEAS - MV P1/2T: 80.1 MSEC
BH CV ECHO MEAS - MV V2 VTI: 38.9 CM
BH CV ECHO MEAS - MVA(P1/2T): 2.7 CM2
BH CV ECHO MEAS - MVA(VTI): 2.8 CM2
BH CV ECHO MEAS - PA V2 MAX: 61.8 CM/SEC
BH CV ECHO MEAS - PULM A REVS DUR: 0.09 SEC
BH CV ECHO MEAS - PULM A REVS VEL: 30.9 CM/SEC
BH CV ECHO MEAS - PULM DIAS VEL: 59.3 CM/SEC
BH CV ECHO MEAS - PULM S/D: 0.89
BH CV ECHO MEAS - PULM SYS VEL: 52.7 CM/SEC
BH CV ECHO MEAS - QP/QS: 0.18
BH CV ECHO MEAS - RAP SYSTOLE: 8 MMHG
BH CV ECHO MEAS - RV MAX PG: 0.54 MMHG
BH CV ECHO MEAS - RV V1 MAX: 36.6 CM/SEC
BH CV ECHO MEAS - RV V1 VTI: 8.5 CM
BH CV ECHO MEAS - RVOT DIAM: 1.7 CM
BH CV ECHO MEAS - RVSP: 43 MMHG
BH CV ECHO MEAS - SV(LVOT): 107.2 ML
BH CV ECHO MEAS - SV(MOD-SP2): 51 ML
BH CV ECHO MEAS - SV(MOD-SP4): 54 ML
BH CV ECHO MEAS - SV(RVOT): 19.4 ML
BH CV ECHO MEAS - TAPSE (>1.6): 2.7 CM
BH CV ECHO MEAS - TR MAX PG: 35 MMHG
BH CV ECHO MEAS - TR MAX VEL: 296 CM/SEC
BH CV ECHO MEASUREMENTS AVERAGE E/E' RATIO: 12.34
BH CV XLRA - RV BASE: 4.2 CM
BH CV XLRA - RV LENGTH: 8.1 CM
BH CV XLRA - RV MID: 3 CM
BH CV XLRA - TDI S': 17 CM/SEC
BUN SERPL-MCNC: 18 MG/DL (ref 8–23)
BUN/CREAT SERPL: 15.7 (ref 7–25)
CALCIUM SPEC-SCNC: 8.5 MG/DL (ref 8.6–10.5)
CHLORIDE SERPL-SCNC: 108 MMOL/L (ref 98–107)
CO2 SERPL-SCNC: 23.7 MMOL/L (ref 22–29)
CREAT SERPL-MCNC: 1.15 MG/DL (ref 0.76–1.27)
DEPRECATED RDW RBC AUTO: 45.5 FL (ref 37–54)
EGFRCR SERPLBLD CKD-EPI 2021: 66.8 ML/MIN/1.73
ERYTHROCYTE [DISTWIDTH] IN BLOOD BY AUTOMATED COUNT: 13.7 % (ref 12.3–15.4)
GLUCOSE SERPL-MCNC: 99 MG/DL (ref 65–99)
HCT VFR BLD AUTO: 33.8 % (ref 37.5–51)
HGB BLD-MCNC: 11 G/DL (ref 13–17.7)
L PNEUMO1 AG UR QL IA: NEGATIVE
LEFT ATRIUM VOLUME INDEX: 50.4 ML/M2
LV EF 2D ECHO EST: 56 %
MAXIMAL PREDICTED HEART RATE: 146 BPM
MCH RBC QN AUTO: 29.6 PG (ref 26.6–33)
MCHC RBC AUTO-ENTMCNC: 32.5 G/DL (ref 31.5–35.7)
MCV RBC AUTO: 90.9 FL (ref 79–97)
MRSA DNA SPEC QL NAA+PROBE: NORMAL
PLATELET # BLD AUTO: 137 10*3/MM3 (ref 140–450)
PMV BLD AUTO: 9 FL (ref 6–12)
POTASSIUM SERPL-SCNC: 4.2 MMOL/L (ref 3.5–5.2)
PROCALCITONIN SERPL-MCNC: 0.39 NG/ML (ref 0–0.25)
QT INTERVAL: 449 MS
RBC # BLD AUTO: 3.72 10*6/MM3 (ref 4.14–5.8)
S PYO AG THROAT QL: NEGATIVE
SINUS: 3 CM
SODIUM SERPL-SCNC: 139 MMOL/L (ref 136–145)
STJ: 2.9 CM
STRESS TARGET HR: 124 BPM
TROPONIN T SERPL-MCNC: 0.1 NG/ML (ref 0–0.03)
WBC NRBC COR # BLD: 5.67 10*3/MM3 (ref 3.4–10.8)

## 2022-05-13 PROCEDURE — 93306 TTE W/DOPPLER COMPLETE: CPT | Performed by: INTERNAL MEDICINE

## 2022-05-13 PROCEDURE — 97530 THERAPEUTIC ACTIVITIES: CPT

## 2022-05-13 PROCEDURE — 84484 ASSAY OF TROPONIN QUANT: CPT | Performed by: STUDENT IN AN ORGANIZED HEALTH CARE EDUCATION/TRAINING PROGRAM

## 2022-05-13 PROCEDURE — 99214 OFFICE O/P EST MOD 30 MIN: CPT | Performed by: INTERNAL MEDICINE

## 2022-05-13 PROCEDURE — 25010000002 ENOXAPARIN PER 10 MG: Performed by: STUDENT IN AN ORGANIZED HEALTH CARE EDUCATION/TRAINING PROGRAM

## 2022-05-13 PROCEDURE — 93005 ELECTROCARDIOGRAM TRACING: CPT | Performed by: STUDENT IN AN ORGANIZED HEALTH CARE EDUCATION/TRAINING PROGRAM

## 2022-05-13 PROCEDURE — G0378 HOSPITAL OBSERVATION PER HR: HCPCS

## 2022-05-13 PROCEDURE — 85027 COMPLETE CBC AUTOMATED: CPT | Performed by: STUDENT IN AN ORGANIZED HEALTH CARE EDUCATION/TRAINING PROGRAM

## 2022-05-13 PROCEDURE — 93306 TTE W/DOPPLER COMPLETE: CPT

## 2022-05-13 PROCEDURE — 80048 BASIC METABOLIC PNL TOTAL CA: CPT | Performed by: STUDENT IN AN ORGANIZED HEALTH CARE EDUCATION/TRAINING PROGRAM

## 2022-05-13 PROCEDURE — 97162 PT EVAL MOD COMPLEX 30 MIN: CPT

## 2022-05-13 PROCEDURE — 92610 EVALUATE SWALLOWING FUNCTION: CPT | Performed by: SPEECH-LANGUAGE PATHOLOGIST

## 2022-05-13 PROCEDURE — 96372 THER/PROPH/DIAG INJ SC/IM: CPT

## 2022-05-13 PROCEDURE — 84145 PROCALCITONIN (PCT): CPT | Performed by: STUDENT IN AN ORGANIZED HEALTH CARE EDUCATION/TRAINING PROGRAM

## 2022-05-13 PROCEDURE — 36415 COLL VENOUS BLD VENIPUNCTURE: CPT | Performed by: STUDENT IN AN ORGANIZED HEALTH CARE EDUCATION/TRAINING PROGRAM

## 2022-05-13 PROCEDURE — 87899 AGENT NOS ASSAY W/OPTIC: CPT | Performed by: STUDENT IN AN ORGANIZED HEALTH CARE EDUCATION/TRAINING PROGRAM

## 2022-05-13 PROCEDURE — 93010 ELECTROCARDIOGRAM REPORT: CPT | Performed by: INTERNAL MEDICINE

## 2022-05-13 RX ADMIN — FUROSEMIDE 20 MG: 20 TABLET ORAL at 08:52

## 2022-05-13 RX ADMIN — ENOXAPARIN SODIUM 90 MG: 100 INJECTION SUBCUTANEOUS at 08:51

## 2022-05-13 RX ADMIN — POTASSIUM CHLORIDE 10 MEQ: 10 TABLET, EXTENDED RELEASE ORAL at 08:51

## 2022-05-13 RX ADMIN — PANTOPRAZOLE SODIUM 40 MG: 40 TABLET, DELAYED RELEASE ORAL at 06:00

## 2022-05-13 RX ADMIN — PRAMIPEXOLE DIHYDROCHLORIDE 3 MG: 1.5 TABLET ORAL at 20:59

## 2022-05-13 RX ADMIN — PAROXETINE HYDROCHLORIDE HEMIHYDRATE 40 MG: 20 TABLET, FILM COATED ORAL at 06:00

## 2022-05-13 RX ADMIN — METOPROLOL TARTRATE 25 MG: 25 TABLET, FILM COATED ORAL at 20:59

## 2022-05-13 NOTE — PLAN OF CARE
Goal Outcome Evaluation:  Plan of Care Reviewed With: patient           Outcome Evaluation: pt is a 73 yo M who was admitted with abdominal pain, cough, and possible NSTEMI. Pt is scheduled for a cardiac cath this date. Pt presents to PT with impaired functional mobility and gait secondary to generalized weakness, impaired balance, and decreased activity tolerance. Pt reports his L foot drag and forward flexed posture are baseline. Pt with unsteady gait and may benefit from skilled PT to address strength, mobility, balance, and gait.

## 2022-05-13 NOTE — PROGRESS NOTES
Discharge Planning Assessment  [facility]     Patient Name: Jose Hurtado  MRN: [id number]  Today's Date: 5/13/2022    Admit Date: 5/12/2022     Discharge Needs Assessment     Row Name 05/13/22 1702       Living Environment    People in Home spouse    Name(s) of People in Home Lena Alvarado [phone]    Current Living Arrangements home    Primary Care Provided by self    Family Caregiver if Needed spouse    Family Caregiver Names spouse Lena Alvarado [phone]    Quality of Family Relationships involved;supportive;helpful    Able to Return to Prior Arrangements yes       Transition Planning    Patient/Family Anticipates Transition to home    Transportation Anticipated family or friend will provide       Discharge Needs Assessment    Concerns to be Addressed no discharge needs identified    Equipment Needed After Discharge none               Discharge Plan     Row Name 05/13/22 1704       Plan    Plan return home with spouse    Plan Comments Spoke with patient and spouse Lena Alvarado [phone]  at bedside, face sheet verified. Patient lives in a single level home he is independent with ADL's and denies using any medical equipment. Patient denies any discharge needs will return home with spouse at discharge. Mary Shankar RN              Continued Care and Services - Admitted Since 5/12/2022    Coordination has not been started for this encounter.          Demographic Summary     Row Name 05/13/22 1702       General Information    Admission Type observation    Arrived From emergency department    Referral Source admission list    Reason for Consult discharge planning    Preferred Language English               Functional Status    No documentation.                Psychosocial    No documentation.                Abuse/Neglect    No documentation.                Legal    No documentation.                Substance Abuse    No documentation.                Patient Forms    No documentation.                    Mary Shankar RN

## 2022-05-13 NOTE — PROGRESS NOTES
[facility] Clinical Pharmacy Services: Enoxaparin Consult    Jose FITCH Hurtado has a pharmacy consult to dose full-dose enoxaparin per Dr. Henley's request.     Indication: h/o DVT/PE  Home Anticoagulation: Eliquis 5 mg BID     Relevant clinical data and objective history reviewed:  72 y.o. male   93.3 kg (205 lb 9.6 oz)   Body mass index is 24.38 kg/m².   Results from last 7 days   Lab Units 05/12/22  1457   PLATELETS 10*3/mm3 157     Estimated Creatinine Clearance: 66.8 mL/min (A) (by C-G formula based on SCr of 1.28 mg/dL (H)).    Assessment/Plan    Will start patient on  90 mg (1mg/kg) subcutaneous every 12 hours, adjusted for renal function. Consult order will be discontinued but pharmacy will continue to follow.     KAYE RIVERS [facility]  Clinical Pharmacist

## 2022-05-13 NOTE — PLAN OF CARE
Goal Outcome Evaluation:                Problem: Adult Inpatient Plan of Care  Goal: Plan of Care Review  Flowsheets (Taken 5/13/2022 0908)  Outcome Evaluation: Noted pt NPO for heart cath today. Will f/u for swallow eval this pm as able.

## 2022-05-13 NOTE — CONSULTS
Patient Name: Jose Hurtado  :1950  72 y.o.    Date of Admission: 2022  Date of Consultation:  22  Encounter Provider: Yaneth Cramer RN  Place of Service: [facility] CARDIOLOGY  Referring Provider: Jake Henley MD  Patient Care Team:  Brandin Bolton MD as PCP - General (Internal Medicine)  Tapan, George THORPE II, MD as Consulting Physician (Hematology and Oncology)  Jose Inman MD as Consulting Physician (Urology)  Mulugeta Millan MD as Consulting Physician (Gastroenterology)  Jose Christine III, MD as Referring Physician (Thoracic Surgery)  Seth Randhawa MD as Consulting Physician (Ophthalmology)  James Cyr MD as Consulting Physician (Cardiology)      Chief complaint: vomiting, cough, shortness of breath    Reason for Consult: elevated troponin     History of Present Illness:       Mr Hurtado is a 72 year old male patient of Dr. Cyr's with history of hypertension, hyperlipidemia, recurrent DVT's, COPD and esophageal cancer with prior esophagectomy with gastric pull through iun  and chemo/radiation, now in remission.    He has a chronic cough nausea and retching.  He has had a few hospitalization for this in the past.  In  he was here with pneumonia and had mildly elevated troponins in the 0.05 range in the setting of acute kidney injury.  At that time he had an echocardiogram which showed normal wall motion and systolic ejection fraction, it was presumed that this was related to demand or ALEC, no ischemic evaluation was performed.  In 2019 just several months before that he had had a normal stress test.    Yesterday he had another episode of coughing, retching.  He had chest tightness and shortness of breath.  The chest tightness persisted a few hours.  It eventually subsided on its own.  His troponins on this admission has been elevated 0.08, up trended to 0.1.  Initially when he arrived to the hospital his kidney function  was mildly abnormal but has normalized this morning.  He says he has no further chest discomfort this morning.    He had a CT to rule out PE.  He had some signs of bronchitis and chronic scarring but no zulema pneumonia.  He has a chronic left pleural effusion which is small.  No pericardial effusion.  His EKG shows normal sinus rhythm without any ischemia.      Troponins were elevated at 0.085/0.094/0.103 with creatinine 1.28. proBNP 420. Procal 0.30.     CT C/A/P was negative fir acute PE, but did noted left-sided bronchitis.     He was subsequently admitted and placed on IV antibiotics.     Previous Cardiac Testing:    Echocardiogram 1/15/20  · Estimated EF = 72%.  · Left ventricular systolic function is hyperdynamic (EF > 70).  · Calculated right ventricular systolic pressure from tricuspid regurgitation is 27.4 mmHg.    Stress Test 4/19/19  · GI artifact is present.  · Left ventricular ejection fraction is normal (Calculated EF = 70%).  · Myocardial perfusion imaging indicates a normal myocardial perfusion study with no evidence of ischemia.  · Impressions are consistent with a low risk study.           Past Medical History:   Diagnosis Date   • Acute deep vein thrombosis (DVT) of popliteal vein of left lower extremity (HCC) 03/24/2020   • Anemia    • Anti-phospholipid syndrome (HCC)     On lifelong anticoagulation therapy   • Bladder disorder     LEAKAGE   ON MED  wers pads   • Bleeding hemorrhoids    • Community acquired pneumonia     HISTORY OF IN 2014   • Deep venous thrombosis (HCC) 2006, 2008    Left lower extremity multiple   • Depression    • Esophageal carcinoma (HCC) 12/31/2014    had chemo and radiation prior surgery   • Hemorrhoids    • HH (hiatus hernia)    • History of atrial fibrillation 2015    ONE EPISODE WHILE HOSPITALIZED   • History of kidney stones    • History of nephrolithiasis    • History of pancreatitis     PT STATES MANY YEARS AGO   • History of radiation therapy    • History of  transfusion    • Hypertension    • Long-term (current) use of anticoagulants, INR goal 2.0-3.0    • Lymphedema    • Malignant neoplasm of prostate (HCC)    • Other hyperlipidemia 1/30/2018 January 30, 2018 lipid panel risk 12.8%   • Restless legs syndrome    • Sleep apnea     OCCASIONALLY WEARS CPAP   • Squamous carcinoma     on the head       Past Surgical History:   Procedure Laterality Date   • APPENDECTOMY  1950   • BRONCHOSCOPY      (Diagnostic)   • CATARACT EXTRACTION Bilateral 2014   • COLONOSCOPY  12/15/2014    Complete / Description: EH, IH, torts, stool, follow-up colonoscopy due in 5 years.   • COLONOSCOPY N/A 6/13/2017    non-thrombosed external hemorrhoids, normal examined ileum, IH   • COLONOSCOPY N/A 2/26/2019    Procedure: COLONOSCOPY with Cold Polypectomy;  Surgeon: Mulugeta Millan MD;  Location: [facility] ENDOSCOPY;  Service: Gastroenterology   • COLONOSCOPY N/A 12/16/2020    Procedure: COLONOSCOPY to cecum into TI;  Surgeon: Mesha Sanchez MD;  Location: [facility] ENDOSCOPY;  Service: Gastroenterology;  Laterality: N/A;  pre: lower GI bleed  post: hemorrhoids    • CYSTOSCOPY W/ LASER LITHOTRIPSY     • ENDOSCOPY N/A 6/13/2017    Procedure: ESOPHAGOGASTRODUODENOSCOPY WITH COLD BIOPSY;  Surgeon: Lake Gonzalez MD;  Location: [facility] ENDOSCOPY;  Service:    • ENDOSCOPY N/A 11/18/2021    Procedure: ESOPHAGOGASTRODUODENOSCOPY WITH  DILATATION WITH FLUOROSCOPY;  Surgeon: Jose Christine III, MD;  Location: [facility] ENDOSCOPY;  Service: Gastroenterology;  Laterality: N/A;  Pre: dysphagia, h/x of adinocarcinoma of esophagus  Post: same   • ESOPHAGECTOMY      April 2015, stage IIB esophageal carcinoma, sub-total resection.   • ESOPHAGECTOMY      Esophagectomy Subtotal Corydon Joe Procedure   • EXCISION LESION  08/2012    Removal of Squamous Cell CA on Head   • HAMMER TOE REPAIR  09/2014    Hammertoe Operation (Each Toe), 10/2014   • HAMMER TOE REPAIR Left 10/3/2017    Procedure: Left second third  and fourth distal interphalangeal joint resection with flexor tenotomy;  Surgeon: Mulugeta Lira MD;  Location:  [facility];  Service:    • HEMORRHOIDECTOMY N/A 12/23/2020    Procedure: HEMORRHOIDECTOMY;  Surgeon: Billy Julio Jr., MD;  Location: [facility] MAIN OR;  Service: General;  Laterality: N/A;   • HERNIA REPAIR      incisional   • JEJUNOSTOMY      Laparoscopic   • JEJUNOSTOMY      tube removal    • KNEE SURGERY Bilateral 1967, 1973, 1981   • PATELLA SURGERY Left     removed   • PILONIDAL CYST / SINUS EXCISION     • SD TOTAL KNEE ARTHROPLASTY Right 3/26/2018    Procedure: TOTAL KNEE ARTHROPLASTY;  Surgeon: Renny Solis MD;  Location: [facility] MAIN OR;  Service: Orthopedics   • PROSTATECTOMY  2010   • PYLOROPLASTY     • SPINAL FUSION  02/1998    C 5,6   • TONSILLECTOMY     • UPPER GASTROINTESTINAL ENDOSCOPY  12/15/2014    LA Grade D esophagitis, Pardo's, HH, multiple duodenal ulcers   • VENTRAL/INCISIONAL HERNIA REPAIR N/A 4/14/2016    Procedure: VENTRAL/INCISIONAL HERNIA REPAIR, open, with mesh, and component separation;  Surgeon: Darren Rivas MD;  Location: [facility] MAIN OR;  Service:          Prior to Admission medications    Medication Sig Start Date End Date Taking? Authorizing Provider   Eliquis 5 MG tablet tablet TAKE ONE TABLET BY MOUTH EVERY 12 HOURS 5/2/22  Yes George Phelan II, MD   furosemide (LASIX) 20 MG tablet TAKE ONE TABLET BY MOUTH DAILY 3/14/22  Yes Brandin Bolton MD   Gemtesa 75 MG tablet Daily. 3/28/22  Yes Provider, MD Anup   metoprolol tartrate (LOPRESSOR) 25 MG tablet TAKE ONE TABLET BY MOUTH TWICE A DAY 7/6/21  Yes Brandin Bolton MD   pantoprazole (PROTONIX) 40 MG EC tablet TAKE ONE TABLET BY MOUTH TWICE A DAY BEFORE A MEAL 9/7/21  Yes Brandin Bolton MD   PARoxetine (PAXIL) 40 MG tablet TAKE ONE TABLET BY MOUTH EVERY MORNING 2/22/22  Yes Brandin Bolton MD   potassium chloride (MICRO-K) 10 MEQ CR capsule TAKE ONE CAPSULE BY MOUTH DAILY 5/27/21  Yes Brandin Bolton MD  "  pramipexole (MIRAPEX) 1.5 MG tablet TAKE ONE TABLET BY MOUTH TWICE A DAY  Patient taking differently: 3 mg Every Night. 2 tabs of 1.5 mg 21  Yes Tex De La Rosa APRN   albuterol sulfate  (90 Base) MCG/ACT inhaler Inhale 1 puff Every 4 (Four) Hours As Needed for Wheezing. 20   Terri Hutchinson APRN   Cyanocobalamin 1000 MCG/ML kit Inject 1 mL as directed Every 30 (Thirty) Days. Intramuscularly. 3/4/22   Brandin Bolton MD   ketoconazole (NIZORAL) 2 % cream  21   ProviderAnup MD   Syringe 25G X 1\" 3 ML misc Use along with B12 . 1 ml into the appropriate side of the muscle once every 30 days. 3/4/22   Brandin Bolton MD       No Known Allergies    Social History     Socioeconomic History   • Marital status:      Spouse name: Lena   • Number of children: 0   • Years of education: College   Tobacco Use   • Smoking status: Former Smoker     Packs/day: 2.00     Years: 3.00     Pack years: 6.00     Types: Cigarettes     Quit date:      Years since quittin.3   • Smokeless tobacco: Former User     Types: Chew   • Tobacco comment: no caffeine    Vaping Use   • Vaping Use: Never used   Substance and Sexual Activity   • Alcohol use: Yes     Alcohol/week: 3.0 standard drinks     Types: 1 Glasses of wine, 1 Cans of beer, 1 Shots of liquor per week     Comment: 1-2 drinks/week   • Drug use: Not Currently     Types: Marijuana     Comment: hx marijuana use \"a long time ago\"   • Sexual activity: Defer       Family History   Problem Relation Age of Onset   • Cerebral aneurysm Mother         cerebral artery aneurysm ( age 56)   • Prostate cancer Brother 68   • Anxiety disorder Father    • Suicide Attempts Father          of suicide   • Cancer Father         bladder   • No Known Problems Brother    • Pancreatic cancer Nephew    • Colon cancer Neg Hx    • Esophageal cancer Neg Hx    • Dementia Neg Hx    • Malig Hyperthermia Neg Hx        REVIEW OF SYSTEMS:   All systems reviewed.  " "Pertinent positives identified in HPI.  All other systems are negative.      Objective:     Vitals:    05/12/22 2055 05/12/22 2104 05/12/22 2325 05/13/22 0605   BP:   135/65    BP Location:   Right arm    Patient Position:   Lying    Pulse:   74    Resp:   22    Temp:   98.4 °F (36.9 °C)    TempSrc:   Oral    SpO2:   94%    Weight: 96.8 kg (213 lb 4.8 oz)   96 kg (211 lb 9.6 oz)   Height:  195.6 cm (77\")       Body mass index is 25.09 kg/m².    General Appearance:    Alert, cooperative, in no acute distress, chronically ill-appearing   Head:    Normocephalic, without obvious abnormality, atraumatic   Eyes:            Lids and lashes normal, conjunctivae and sclerae normal, no icterus, no pallor, corneas clear, PERRLA   Ears:    Ears appear intact with no abnormalities noted   Throat:   No oral lesions, no thrush, oral mucosa moist   Neck:   No adenopathy, supple, trachea midline, no thyromegaly, no carotid bruit, no JVD   Back:     No kyphosis present, no scoliosis present, no skin lesions, erythema or scars, no tenderness to percussion or palpation, range of motion normal   Lungs:     Clear to auscultation, respirations regular, even and unlabored    Heart:    Regular rhythm and normal rate, normal S1 and S2, no murmur, no gallop, no rub, no click   Chest Wall:    No abnormalities observed   Abdomen:     Normal bowel sounds, no masses, no organomegaly, soft, nontender, nondistended, no guarding, no rebound  tenderness   Extremities:  Lymphedema, bilateral leg wrapping   Pulses:   Pulses palpable and equal bilaterally. Normal radial, carotid, femoral, dorsalis pedis and posterior tibial pulses bilaterally. Normal abdominal aorta   Skin:  Psychiatric:   No bleeding, bruising or rash    Alert and oriented x 3, normal mood and affect   Lab Review:     Results from last 7 days   Lab Units 05/12/22  1457   SODIUM mmol/L 140   POTASSIUM mmol/L 4.1   CHLORIDE mmol/L 106   CO2 mmol/L 24.0   BUN mg/dL 19   CREATININE mg/dL " 1.28*   CALCIUM mg/dL 8.9   BILIRUBIN mg/dL 0.5   ALK PHOS U/L 113   ALT (SGPT) U/L 19   AST (SGOT) U/L 25   GLUCOSE mg/dL 112*     Results from last 7 days   Lab Units 05/12/22  2357 05/12/22  1716 05/12/22  1457   TROPONIN T ng/mL 0.103* 0.094* 0.085*     Results from last 7 days   Lab Units 05/12/22  1457   WBC 10*3/mm3 5.01   HEMOGLOBIN g/dL 12.8*   HEMATOCRIT % 37.3*   PLATELETS 10*3/mm3 157                         EKG 5/13/22    Admit EKG 5/12/22    Previous EKG 3/16/22      I personally viewed and interpreted the patient's EKG/Telemetry data.        Assessment and Plan:       1.  Elevated troponin: In the setting of chest pain.  May be related to demand from severe retching and/or abnormal kidney function likely related to dehydration, however given this is a second hospitalization with chest pain and mildly elevated troponins I feel the most reasonable to proceed with cardiac catheterization to rule out any major obstructive disease.  Last Eliquis was Wednesday but he was given lovenox full dose this morning. Will see how his symptoms and course go and can consider cath either this weekend or on Monday.   2. Chronic DVT/PE- eliquis at home with associated lymphedema no PE on CTA  3. HTN  4. HLD  5. PVCs asymptomatic  6. H/o esophageal cancer s/p esophagemctomy and pull through w  7. Wretching, nausea, vomitting    Leatha Houser MD  [facility]  05/13/22

## 2022-05-13 NOTE — PLAN OF CARE
Goal Outcome Evaluation:  Plan of Care Reviewed With: patient           Outcome Evaluation: Clinical swallow eval completed.  Pt demonstraatd no s/s aspsiration with thinliquid by cup/straw, puree, soft/hard solids or mixed consisteency.  Adequate laryngeal elevation, mastication,and oral clearance.  Pt took 4 ounces of applesauce, peaches, 2 crackerss and 6 oounces of water.  No coughing 20 minutes following ingestion.  REC soft chopped meats, thin liquids.  Meds as tolerated.  Upright during and 1-2 hours aftermeals.  Also discussed more frequent smaller meals.

## 2022-05-13 NOTE — PROGRESS NOTES
Name: Jose Hurtado ADMIT: 2022   : 1950  PCP: Brnadin Bolton MD    MRN: [id number] LOS: 0 days   AGE/SEX: 72 y.o. male  ROOM:      Subjective   Subjective   Seen working with SLP therapist.  Doing well.  Wife at bedside.  Denies nausea or chest pain currently    Review of Systems  Denies shortness of breath, abdominal pain, fevers, lower extremity swelling     Objective   Objective   Vital Signs  Temp:  [97.8 °F (36.6 °C)-98.7 °F (37.1 °C)] 98.7 °F (37.1 °C)  Heart Rate:  [66-83] 66  Resp:  [20-22] 20  BP: (115-139)/(60-77) 127/74  SpO2:  [94 %-95 %] 94 %  on   ;   Device (Oxygen Therapy): room air  Body mass index is 24.9 kg/m².  Physical Exam  Constitutional:       General: He is not in acute distress.     Appearance: He is not ill-appearing.   Cardiovascular:      Rate and Rhythm: Normal rate and regular rhythm.   Pulmonary:      Effort: Pulmonary effort is normal. No respiratory distress.      Breath sounds: No wheezing or rhonchi.   Abdominal:      Palpations: Abdomen is soft.      Tenderness: There is no abdominal tenderness. There is no guarding.   Musculoskeletal:      Right lower leg: No edema.      Left lower leg: No edema.   Neurological:      Mental Status: He is alert.   Psychiatric:         Mood and Affect: Mood normal.         Results Review     I reviewed the patient's new clinical results.  Results from last 7 days   Lab Units 22  0612 22  1457   WBC 10*3/mm3 5.67 5.01   HEMOGLOBIN g/dL 11.0* 12.8*   PLATELETS 10*3/mm3 137* 157     Results from last 7 days   Lab Units 22  0612 22  1457   SODIUM mmol/L 139 140   POTASSIUM mmol/L 4.2 4.1   CHLORIDE mmol/L 108* 106   CO2 mmol/L 23.7 24.0   BUN mg/dL 18 19   CREATININE mg/dL 1.15 1.28*   GLUCOSE mg/dL 99 112*   Estimated Creatinine Clearance: 76 mL/min (by C-G formula based on SCr of 1.15 mg/dL).  Results from last 7 days   Lab Units 22  1457   ALBUMIN g/dL 3.90   BILIRUBIN mg/dL 0.5   ALK PHOS U/L 113    AST (SGOT) U/L 25   ALT (SGPT) U/L 19     Results from last 7 days   Lab Units 05/13/22  0612 05/12/22  1457   CALCIUM mg/dL 8.5* 8.9   ALBUMIN g/dL  --  3.90     Results from last 7 days   Lab Units 05/13/22  0612 05/12/22  1716   PROCALCITONIN ng/mL 0.39* 0.30*     COVID19   Date Value Ref Range Status   05/12/2022 Not Detected Not Detected - Ref. Range Final   11/16/2021 Not Detected Not Detected - Ref. Range Final     No results found for: HGBA1C, POCGLU    CT Angiogram Chest, CT Abdomen Pelvis With Contrast  Narrative: CT ANGIOGRAM CHEST-, CT ABDOMEN PELVIS W CONTRAST-     CLINICAL HISTORY: Dyspnea. History of esophageal carcinoma. Status post  esophagectomy.     TECHNIQUE: Spiral CT images were obtained through the chest during rapid  IV injection of contrast and were reconstructed in 2 mm thick axial  slices. Subsequently images of the abdomen and pelvis were obtained with  IV contrast.     Radiation dose reduction techniques were utilized, including automated  exposure control and exposure modulation based on body size.     COMPARISON: CT imaging of the chest abdomen and pelvis dated 03/22/2022.     FINDINGS: The main pulmonary arteries and their lobar and segmental  branches are well opacified and demonstrate no filling defects. There is  no CT evidence of acute pulmonary thromboembolism. The thoracic aorta  was also well opacified and is unremarkable. The patient is status post  esophagectomy with gastric pull-through procedure. The pull-through  appears unremarkable and unchanged. There is no mediastinal or hilar or  axillary lymphadenopathy. Lung window images demonstrate multiple  curvilinear areas of abnormal increased density in the inferior aspect  of the right lung that are consistent with chronic fibrotic scarring.  These are unchanged. There are no discrete lung masses. There is mild  peribronchial thickening in both lower lung zones and also numerous  reticulonodular opacities scattered  throughout both lungs that are new  since the preceding CT scan and are consistent with sequela of  bronchitis. Minimal groundglass opacities are also noted suspicious for  pneumonia, most prominent in the lingula. A tiny loculated left pleural  effusion is also unchanged.     The liver, spleen and adrenal glands are unremarkable. There is a small  approximately 3 mm diameter nonobstructing lower pole left renal  calculus that appears essentially unchanged. The kidneys are otherwise  unremarkable. The neck of the pancreas is retracted superiorly into the  anterior aspect of the aortic hiatus due to the surgery. However, this  is new since the preceding CT scan dated 03/20/2022. The pancreas is  otherwise unremarkable. The small bowel and colon appear within normal  limits. No lymphadenopathy is identified in the abdomen or pelvis. The  prostate gland appears markedly atrophic or surgically absent.     Impression: There is no CT evidence of acute pulmonary thromboembolism.  There is mild peribronchial thickening in both lower lung zones and also  patchy reticulonodular opacities in both lower lung zones that are new  since the preceding CT dated 03/20/2022 and are consistent with sequela  of bronchitis. Minimal patchy groundglass infiltrate is also present,  most prominent in the lingula. Postoperative changes as described. There  is no evidence of recurrent esophageal carcinoma or metastatic disease  within the chest abdomen or pelvis. There is a single tiny  nonobstructing lower pole left renal calculus. No acute process is  evident in the abdomen or pelvis.     This report was finalized on 5/12/2022 5:30 PM by Dr. Rios Denny M.D.     XR Chest 2 View  Narrative: CHEST: 2 VIEWS     HISTORY: Cough and shortness of air.     COMPARISON: CT chest 03/22/2022, 2 view chest 08/06/2021.     FINDINGS:Heart size is enlarged. There has been previous  esophagogastrectomy with gastric pull-through extending along the  medial  right lung base with adjacent compressive right lower lobe atelectasis.  There is a small right pleural effusion and there is right pleural  thickening, similar to the CT 03/22/2022. There are rib changes in the  right related to previous thoracotomy. There is no evidence of pulmonary  edema or pneumothorax. Left lung appears clear.     Impression: Postsurgical changes in the right where there has been  thoracotomy, as well as esophagogastrectomy with gastric pull-through.  Compressive atelectasis right lower lobe with right pleural effusion and  pleural thickening. The findings are similar to previous chest CT  03/22/2022.     This report was finalized on 5/12/2022 4:32 PM by Dr. Andrade Boo M.D.     XR Chest 1 View  Narrative: CHEST SINGLE VIEW     HISTORY: Shortness of breath, abdominal pain     COMPARISON: Two-view chest 05/12/2022, CT chest 03/22/2022, 2 view chest  08/06/2021.     FINDINGS: Heart size is enlarged. There has been esophagectomy with  gastric pull-through and the stomach extends medial to the right lower  lobe with adjacent compressive right lower lobe atelectasis. There is a  small right pleural effusion and there is right-sided pleural  thickening. Rib changes are present on the right related to previous  right thoracotomy. There is no evidence for change compared to the 2  view chest obtained just prior to this exam. Left lung appears clear.     Impression: Postsurgical changes on the right with where there has been  thoracotomy with esophagogastrectomy and gastric pull-through. There is  compressive atelectasis in the right lower lobe. Right pleural effusion  is present and there is right-sided pleural thickening. No evidence for  change compared to the PA and lateral chest performed just previous to  this exam.     This report was finalized on 5/12/2022 4:31 PM by Dr. Andrade Boo M.D.     CT SCAN - EXTERNAL RESULT SCAN  This result has an attachment that is not  available.  CT SCAN - EXTERNAL RESULT SCAN  This result has an attachment that is not available.  PET SCAN - EXTERNAL RESULT SCAN  This result has an attachment that is not available.  DIAGNOSTIC RADIOLOGY - EXTERNAL RESULT SCAN  This result has an attachment that is not available.  PET SCAN - EXTERNAL RESULT SCAN  This result has an attachment that is not available.  XR Chest 2Vw  RADIOLOGY REPORT    FACILITY:  [facility]  UNIT/AGE/GENDER: J.ED  ER      AGE:68 Y          SEX:M  PATIENT NAME/:  JJ BOURGEOIS W    1950  UNIT NUMBER:  [id number]  ACCOUNT NUMBER:  [id number]  ACCESSION NUMBER:  [id number]    EXAMINATION: CHEST, 2 VIEWS    DATE: 2018    COMPARISON: None available    INDICATION: vomiting, small amt blood, h/o esophagectomy, r/o free air.        FINDINGS: No free peritoneal air is identified. No pneumothorax. Small to moderate right pleural effusion. Opacity in the medial right lower chest is compatible with gastric pull-through and history of esophagectomy. Atelectasis or infiltrate in the   right lower lung. Minimal left basal atelectasis. Heart size is probably normal.    IMPRESSION:  1. Negative for free peritoneal air, pneumothorax, and pneumomediastinum.  2. Postoperative changes from esophagectomy and gastric pull-through  3. Small to moderate right pleural effusion with associated atelectasis, scar, or infiltrate of the right lower lung    Dictated by: Juanpablo Aguirre M.D.    Images and Report reviewed and interpreted by: Juanpablo Aguirre M.D.    <PS><Electronically signed by: Juanpablo Aguirre M.D.>  2018 1720    D: 2018 171  T: 2018 1719  XR Sacrum Only Min 2 Vws  RADIOLOGY REPORT    [facility]  UNIT/AGE/GENDER: M.PERIOP  OP      AGE:69 Y          SEX:M  PATIENT NAME/:  JJ BOURGEOIS W    1950  UNIT NUMBER:  [id number]  ACCOUNT NUMBER:  [id number]  ACCESSION NUMBER:   [id number]    Intraoperative fluoroscopic images of the XR SACRUM ONLY MIN 2 VWS 10/14/2019 2:31 PM    HISTORY: interstim     COMPARISON: None.    TECHNIQUE:  2 Intraoperative fluoroscopic images of the sacrum were obtained. Total fluoroscopy time 34 seconds.    FINDINGS:     Intraoperative fluoroscopic images performed of the sacrum during neurostimulator lead placement. Refer to operative report for further description.    IMPRESSION:     Intraoperative fluoroscopic images of the sacrum performed for surgical planning purposes.    Dictated by: Nasir Conroy M.D.    Images and Report reviewed and interpreted by: Nasir Conroy M.D.    <PS><Electronically signed by: Nasir Conroy M.D.>  10/14/2019 1516    D: 10/14/2019 1515  T: 10/14/2019 1515  XR Knee 3 Vws RT  Narrative: This result has an attachment that is not available.  ·  Impression: Chief Complaint: History of total right knee replacement  · Date of Exam: 4/16/21  · Procedure Performed: XR KNEE 3 VWS RT  · Performing MD: Chauncey Parikh MD  · History:   Impression: 4V right  knee(s) were taken in the office today, including   AP, flexion PA, lateral, and sunrise views:  · Total Knee Arthroplasty in place.  Tibial Position - neutral, Femoral   position - neutral. Polyethylene demonstrates symmetric wear.  There is no   evidence of fracture or dislocation.  Patellar height - Normal and   alignment is abnormal: lateral patellar tilt with impingement of lateral   facet on distal femur.  Mild radiolucent lines seen around femur on   lateral view. No other periosteal reactions or medullary lesions are seen.    Mod-severe L knee OA L>M.  Patellectomy noted. Joint space narrowing   complete lateral. Osteophytes noted.    Interpreted By: Chauncey Parikh MD  XR Knee 4 or More Vws LT  Narrative: This result has an attachment that is not available.  ·  Impression: Chief Complaint: Left knee pain, unspecified chronicity  · Date of Exam: 4/16/21  · Procedure  Performed: XR KNEE 4 OR MORE VWS LT  · Performing MD: Chauncey Parikh MD  · History:   Impression:   4V right  knee(s) were taken in the office today, including AP, flexion   PA, lateral, and sunrise views:  Total Knee Arthroplasty in place.  Tibial Position - neutral, Femoral   position - neutral. Polyethylene demonstrates symmetric wear.  There is no   evidence of fracture or dislocation.  Patellar height - Normal and   alignment is abnormal: lateral patellar tilt with impingement of lateral   facet on distal femur.  Mild radiolucent lines seen around femur on   lateral view. No other periosteal reactions or medullary lesions are seen.    Mod-severe L knee OA L>M.  Patellectomy noted. Joint space narrowing   complete lateral. Osteophytes noted.  ·     Interpreted By: Chauncey Parikh MD  NM 3 Phase Bone Scan  [facility] FOR DISCUSSING THOSE RESULTS WITH YOUR HEALTH CARE PROVIDER.  PLE[facility] TO DISCUSS ANY QUESTIONS OR CONCERNS YOU MAY HAVE REGARDING THESE TEST RESULTS.    NUCLEAR MEDICINE REPORT    FACILITY:  [facility]  UNIT/AGE/GENDER: REILLY  OP      AGE:71 Y          SEX:M  PATIENT NAME/:  JJ BOURGEOIS W    1950  UNIT NUMBER:  [id number]  ACCOUNT NUMBER:  [id number]  ACCESSION NUMBER:  [id number]    NM 3 PHASE BONE SCAN    DATE: 2021    COMPARISON: Plain film exams of the knees dated 2021.    INDICATION: painful R TKA.    FINDINGS: The patient received 24.1 mCi Technetium 99m, MDP, intravenously. Dynamic and blood pool imaging was performed over the region of the knees. There was no increased blood flow activity. There was only increased blood pool activity is seen near   the lateral margin of the right patella.    On delayed imaging, there is mild to moderate increased activity in the region of the patella. Note is made that the left patella is surgically absent.  There is mild activity seen in the region of the left knee joint on delayed imaging compatible with   degenerative/arthritic change. On the right, there is a photopenic zone at the knee joint that correlates with the hardware from the knee replacement. There is a slight rim of activity at the bone metal interface of the tibial component of the   prosthesis. Slight activity is seen at the bone metal interface of the femoral component of the prosthesis.    IMPRESSION:  1. Nonspecific increased activity in the patella region on the right. This could be reactive in nature. The patella appears sclerotic on plain film.  2. Slight increased rim of activity at the bone metal interface of the tibial component and to a lesser degree the femoral component of the right knee replacement. This could potentially reflect slight loosening, supported by the presence of subtle   radiolucent lines between the bone metal interface of the prosthesis. The relative lack of intensity as well as lack of increased blood flow and blood pool activity makes infection unlikely.  3. Mild activity in the region of the left knee joint compatible with degenerative/arthritic change.    Dictated by: Nigel Crisostomo M.D.    Images and Report reviewed and interpreted by: Nigel Crisostomo M.D.    <PS><Electronically signed by: Nigel Crisostomo M.D.>  2021 1427    D: 2021 1416  T: 2021 1416  FL Major Joint Injection Left; LIDOCAINE AND DEPOMEDROL  REVIEWING YOUR TEST RESULTS IN [facility] IS NOT A SUBSTITUTE FOR DISCUSSING THOSE RESULTS WITH YOUR HEALTH CARE PROVIDER.  PLEASE CONTACT YOUR PROVIDER VIA [facility] TO DISCUSS ANY QUESTIONS OR CONCERNS YOU MAY HAVE REGARDING THESE TEST RESULTS.    RADIOLOGY REPORT    FACILITY:  [facility]  UNIT/AGE/GENDER: SKYLER  OP      AGE:72 Y          SEX:M  PATIENT NAME/:  JJ BOURGEOIS W    1950  UNIT NUMBER:  [id number]  ACCOUNT NUMBER:  [id number]  ACCESSION NUMBER:   [id number]    EXAMINATION: Fluoroscopic guided right hip joint injection    DATE: 02/10/2022    COMPARISON: None available    INDICATION: Right hip pain and weakness    DESCRIPTION: The risks, benefits, and details of the procedure were described to the patient and informed consent was obtained. A timeout procedure was performed. The skin was marked and prepped in the usual fashion. Skin and subcutaneous tissues were   anesthetized with buffered Lidocaine. With fluoroscopic guidance, a 22-gauge spinal needle was inserted into the right hip joint. Contrast injection confirmed intra-articular position of the needle. A solution of 3 mL of 1% Lidocaine and 80 mg of   Depo-Medrol  were injected into the joint. The needle was removed, and hemostasis was achieved. A bandage was placed. The patient tolerated the procedure well, and there were no complications.     Fluoroscopy time: 0.1 minutes.    Fluoroscopic images obtained: 0.4    The patient reported one out of 10 pain prior to and following the injection.    IMPRESSION:   Successful fluoroscopic-guided right hip injection as above described.     Dictated by: Anabella Mazariegos M.D.    Images and Report reviewed and interpreted by: Anabella Mazariegos M.D.    <PS><Electronically signed by: Anabella Mazariegos M.D.>  02/10/2022 1447    D: 02/10/2022 1445  T: 02/10/2022 1445  FL Major Joint Injection right; LIDOCAINE AND DEPOMEDROL  REVIEWING YOUR TEST RESULTS IN [facility] IS NOT A SUBSTITUTE FOR DISCUSSING THOSE RESULTS WITH YOUR HEALTH CARE PROVIDER.  PLEASE CONTACT YOUR PROVIDER VIA [facility] TO DISCUSS ANY QUESTIONS OR CONCERNS YOU MAY HAVE REGARDING THESE TEST RESULTS.    RADIOLOGY REPORT    FACILITY:  [facility]  UNIT/AGE/GENDER: JOSEEmperatrizPETE  OP      AGE:72 Y          SEX:M  PATIENT NAME/:  JJ BOURGEOIS W    1950  UNIT NUMBER:  [id number]  ACCOUNT NUMBER:  [id number]  ACCESSION NUMBER:  [id number]    EXAMINATION: Fluoroscopic guided left  hip joint injection    DATE: 02/10/2022    COMPARISON: None available    INDICATION: Left hip pain    DESCRIPTION: The risks, benefits, and details of the procedure were described to the patient and informed consent was obtained. A timeout procedure was performed. The skin was marked and prepped in the usual fashion. Skin and subcutaneous tissues were   anesthetized with buffered Lidocaine. With fluoroscopic guidance, a 22-gauge spinal needle was inserted into the left hip joint. Contrast injection confirmed intra-articular position of the needle. A solution of 3 mL of 1% Lidocaine and 80 mg of   Depo-Medrol  were injected into the joint. The needle was removed, and hemostasis was achieved. A bandage was placed. The patient tolerated the procedure well, and there were no complications.     Fluoroscopy time: 0.1 minutes.    Fluoroscopic images obtained: 1    Patient reported one out of 10 pain prior to and following injection.    IMPRESSION:   Successful fluoroscopic-guided left hip injection as above described.    Dictated by: Anabella Mazariegos M.D.    Images and Report reviewed and interpreted by: Anabella Mazariegos M.D.    <PS><Electronically signed by: Anabella Mazariegos M.D.>  02/10/2022 1448    D: 02/10/2022 1447  T: 02/10/2022 1447    I reviewed the patient's daily medications.  Scheduled Medications  furosemide, 20 mg, Oral, Daily  metoprolol tartrate, 25 mg, Oral, BID  Mirabegron ER, 50 mg, Oral, Daily  pantoprazole, 40 mg, Oral, QAM AC  PARoxetine, 40 mg, Oral, QAM  potassium chloride, 10 mEq, Oral, Daily  pramipexole, 3 mg, Oral, Nightly    Infusions   Diet  NPO Diet NPO Type: Sips with Meds  Diet Soft Texture; Chopped; Thin       Assessment/Plan     Active Hospital Problems    Diagnosis  POA   • **NSTEMI (non-ST elevated myocardial infarction) (HCC) [I21.4]  Yes   • Bronchitis [J40]  Yes   • CKD (chronic kidney disease) stage 2, GFR 60-89 ml/min [N18.2]  Yes   • Chronic anticoagulation [Z79.01]  Not Applicable   •  "COPD (chronic obstructive pulmonary disease) ([facility]) [J44.9]  Yes   • Hypertension [I10]  Yes   • Adenocarcinoma of esophagus (HCC) [C15.9]  Yes      Resolved Hospital Problems   No resolved problems to display.       72 y.o. male with history of esophageal cancer s/p esophagectomy with gastric pull through in 2015 and chemo/radiation in remission, COPD, history of DVT/PE on eliquis who presented with cough, retching and chest tightness.  He was found to have elevated troponin.    NSTEMI  -Chest pain is atypical but due to his risk factors (hypertension, hyperlipidemia, history of tobacco use), cardiology recommends catheterization.  Cardiology will \"consider either this weekend or on Monday\" as Eliquis will need to be held  -Follow-up echo    Bronchitis  -Pro-Gonzalez slightly elevated at 0.3.  Strep pneumo, Legionella urine antigens negative.  Minimal sputum production for culture.  Afebrile and no leukocytosis  -Stop antibiotics.  Encourage incentive spirometry    History of esophageal cancer status post esophagectomy with gastric pull-through  -No signs or symptoms of aspiration per SLP.  Continue PPI  -GI consult for consideration of EGD    Chronic DVT/PE  -Holding home Eliquis in anticipation of cardiac catheterization.  No PE on CTA chest       · SCDs for DVT prophylaxis.  · Full code.  · Discussed with patient, family, nursing staff and ED provider.  · Anticipate discharge home timing yet to be determined.      Yara Julio DO  [facility]  05/13/22  15:38 EDT    Patient was placed in face mask on first look.  I wore protective equipment throughout this patient encounter including a face mask, gloves and protective eyewear.  Hand hygiene was performed before donning protective equipment and after removal when leaving the room.        "

## 2022-05-13 NOTE — ED NOTES
Nursing report ED to floor  Jose Hurtado  72 y.o.  male    HPI :   Chief Complaint   Patient presents with   • Abdominal Pain       Admitting doctor:   Jake Henley MD    Admitting diagnosis:   The primary encounter diagnosis was NSTEMI (non-ST elevated myocardial infarction) (HCC). Diagnoses of Chronic obstructive pulmonary disease, unspecified COPD type (HCC) and History of esophageal cancer were also pertinent to this visit.    Code status:   Current Code Status     Date Active Code Status Order ID Comments User Context       Prior    Advance Care Planning Activity          Allergies:   Patient has no known allergies.    Intake and Output  No intake or output data in the 24 hours ending 05/12/22 2003    Weight:       05/12/22 2002   Weight: 93.3 kg (205 lb 9.6 oz)       Most recent vitals:   Vitals:    05/12/22 1801 05/12/22 1816 05/12/22 1846 05/12/22 2002   BP: 128/69 131/71 128/70    BP Location:       Patient Position:       Pulse:       Resp:       Temp:       SpO2:       Weight:    93.3 kg (205 lb 9.6 oz)       Active LDAs/IV Access:   Lines, Drains & Airways     Active LDAs     Name Placement date Placement time Site Days    Peripheral IV 05/12/22 1450 Anterior;Left;Upper Antecubital 05/12/22  1450  Antecubital  less than 1                Labs (abnormal labs have a star):   Labs Reviewed   COMPREHENSIVE METABOLIC PANEL - Abnormal; Notable for the following components:       Result Value    Glucose 112 (*)     Creatinine 1.28 (*)     eGFR 58.7 (*)     All other components within normal limits    Narrative:     GFR Normal >60  Chronic Kidney Disease <60  Kidney Failure <15     CBC WITH AUTO DIFFERENTIAL - Abnormal; Notable for the following components:    Hemoglobin 12.8 (*)     Hematocrit 37.3 (*)     Neutrophil % 77.0 (*)     Lymphocyte % 15.6 (*)     All other components within normal limits   TROPONIN (IN-HOUSE) - Abnormal; Notable for the following components:    Troponin T 0.085 (*)     All other  components within normal limits    Narrative:     Troponin T Reference Range:  <= 0.03 ng/mL-   Negative for AMI  >0.03 ng/mL-     Abnormal for myocardial necrosis.  Clinicians would have to utilize clinical acumen, EKG, Troponin and serial changes to determine if it is an Acute Myocardial Infarction or myocardial injury due to an underlying chronic condition.       Results may be falsely decreased if patient taking Biotin.     TROPONIN (IN-HOUSE) - Abnormal; Notable for the following components:    Troponin T 0.094 (*)     All other components within normal limits    Narrative:     Troponin T Reference Range:  <= 0.03 ng/mL-   Negative for AMI  >0.03 ng/mL-     Abnormal for myocardial necrosis.  Clinicians would have to utilize clinical acumen, EKG, Troponin and serial changes to determine if it is an Acute Myocardial Infarction or myocardial injury due to an underlying chronic condition.       Results may be falsely decreased if patient taking Biotin.     RESPIRATORY PANEL PCR W/ COVID-19 (SARS-COV-2) TONIE/HOWARD/CARMEN/[facility] IN-HOUSE, NP SWAB IN [facility], 3-4 HR TAT - Normal    Narrative:     In the setting of a positive respiratory panel with a viral infection PLUS a negative procalcitonin without other underlying concern for bacterial infection, consider observing off antibiotics or discontinuation of antibiotics and continue supportive care. If the respiratory panel is positive for atypical bacterial infection (Bordetella pertussis, Chlamydophila pneumoniae, or Mycoplasma pneumoniae), consider antibiotic de-escalation to target atypical bacterial infection.   BNP (IN-HOUSE) - Normal    Narrative:     Among patients with dyspnea, NT-proBNP is highly sensitive for the detection of acute congestive heart failure. In addition NT-proBNP of <300 pg/ml effectively rules out acute congestive heart failure with 99% negative predictive value.    Results may be falsely decreased if patient taking Biotin.     RAINBOW  DRAW    Narrative:     The following orders were created for panel order [place] Draw.  Procedure                               Abnormality         Status                     ---------                               -----------         ------                     Green Top (Gel)[[id number]]                                  Final result               Lavender Top[[id number]]                                     Final result               Gold Top - SST[[id number]]                                   Final result               Light Blue Top[[id number]]                                   Final result                 Please view results for these tests on the individual orders.   CBC AND DIFFERENTIAL    Narrative:     The following orders were created for panel order CBC & Differential.  Procedure                               Abnormality         Status                     ---------                               -----------         ------                     CBC Auto Differential[[id number]]        Abnormal            Final result                 Please view results for these tests on the individual orders.   GREEN TOP   LAVENDER TOP   GOLD TOP - SST   LIGHT BLUE TOP       EKG:   ECG 12 Lead   Final Result   HEART RATE= 83  bpm   RR Interval= 720  ms   CT Interval= 214  ms   P Horizontal Axis= 30  deg   P Front Axis= 39  deg   QRSD Interval= 107  ms   QT Interval= 400  ms   QRS Axis= -2  deg   T Wave Axis= 31  deg   - ABNORMAL ECG -   Sinus rhythm   Borderline prolonged CT interval   Probable left atrial enlargement   Borderline T abnormalities, inferior leads   NO SIGNIFICANT CHANGE FROM PREVIOUS ECG   Electronically Signed By: James Cyr ([facility]) 12-May-2022 18:31:25   Date and Time of Study: 2022-05-12 16:57:25          Meds given in ED:   Medications   sodium chloride 0.9 % flush 10 mL (has no administration in time range)   nitroglycerin (NITROSTAT) SL tablet 0.4 mg (0.4 mg Sublingual Given 5/12/22 0358)   acetaminophen  (TYLENOL) tablet 650 mg (has no administration in time range)   ondansetron (ZOFRAN) tablet 4 mg (has no administration in time range)     Or   ondansetron (ZOFRAN) injection 4 mg (has no administration in time range)   melatonin tablet 3 mg (has no administration in time range)   Pharmacy to Dose enoxaparin (LOVENOX) (has no administration in time range)   ondansetron ODT (ZOFRAN-ODT) disintegrating tablet 4 mg (4 mg Oral Given 5/12/22 1626)   iopamidol (ISOVUE-370) 76 % injection 100 mL (100 mL Intravenous Given by Other 5/12/22 1641)   aspirin chewable tablet 324 mg (324 mg Oral Given 5/12/22 1759)       Imaging results:  XR Chest 2 View    Result Date: 5/12/2022  Postsurgical changes in the right where there has been thoracotomy, as well as esophagogastrectomy with gastric pull-through. Compressive atelectasis right lower lobe with right pleural effusion and pleural thickening. The findings are similar to previous chest CT 03/22/2022.  This report was finalized on 5/12/2022 4:32 PM by Dr. Andrade Boo M.D.      CT Abdomen Pelvis With Contrast    Result Date: 5/12/2022  There is no CT evidence of acute pulmonary thromboembolism. There is mild peribronchial thickening in both lower lung zones and also patchy reticulonodular opacities in both lower lung zones that are new since the preceding CT dated 03/20/2022 and are consistent with sequela of bronchitis. Minimal patchy groundglass infiltrate is also present, most prominent in the lingula. Postoperative changes as described. There is no evidence of recurrent esophageal carcinoma or metastatic disease within the chest abdomen or pelvis. There is a single tiny nonobstructing lower pole left renal calculus. No acute process is evident in the abdomen or pelvis.  This report was finalized on 5/12/2022 5:30 PM by Dr. Rios Denny M.D.      XR Chest 1 View    Result Date: 5/12/2022  Postsurgical changes on the right with where there has been thoracotomy with  "esophagogastrectomy and gastric pull-through. There is compressive atelectasis in the right lower lobe. Right pleural effusion is present and there is right-sided pleural thickening. No evidence for change compared to the PA and lateral chest performed just previous to this exam.  This report was finalized on 2022 4:31 PM by Dr. Andrade Boo M.D.      CT Angiogram Chest    Result Date: 2022  There is no CT evidence of acute pulmonary thromboembolism. There is mild peribronchial thickening in both lower lung zones and also patchy reticulonodular opacities in both lower lung zones that are new since the preceding CT dated 2022 and are consistent with sequela of bronchitis. Minimal patchy groundglass infiltrate is also present, most prominent in the lingula. Postoperative changes as described. There is no evidence of recurrent esophageal carcinoma or metastatic disease within the chest abdomen or pelvis. There is a single tiny nonobstructing lower pole left renal calculus. No acute process is evident in the abdomen or pelvis.  This report was finalized on 2022 5:30 PM by Dr. Rios Denny M.D.        Ambulatory status:   - asst 1    Social issues:   Social History     Socioeconomic History   • Marital status:      Spouse name: Lena   • Number of children: 0   • Years of education: College   Tobacco Use   • Smoking status: Former Smoker     Packs/day: 2.00     Years: 3.00     Pack years: 6.00     Types: Cigarettes     Quit date:      Years since quittin.3   • Smokeless tobacco: Former User     Types: Chew   • Tobacco comment: no caffeine    Vaping Use   • Vaping Use: Never used   Substance and Sexual Activity   • Alcohol use: Yes     Alcohol/week: 3.0 standard drinks     Types: 1 Glasses of wine, 1 Cans of beer, 1 Shots of liquor per week     Comment: 1-2 drinks/week   • Drug use: Not Currently     Types: Marijuana     Comment: hx marijuana use \"a long time ago\"   • Sexual " activity: Defer       NIH Stroke Scale:        Nursing report ED to floor:

## 2022-05-13 NOTE — THERAPY EVALUATION
Patient Name: Jose Hurtado  : 1950    MRN: [id number]                              Today's Date: 2022       Admit Date: 2022    Visit Dx:     ICD-10-CM ICD-9-CM   1. NSTEMI (non-ST elevated myocardial infarction) (HCC)  I21.4 410.70   2. Chronic obstructive pulmonary disease, unspecified COPD type (HCC)  J44.9 496   3. History of esophageal cancer  Z85.01 V10.03     Patient Active Problem List   Diagnosis   • Adenocarcinoma of esophagus (HCC)   • Adenomatous polyp of colon   • Malignant neoplasm of prostate (HCC)   • RLS (restless legs syndrome)   • History of DVT (deep vein thrombosis)   • Recurrent major depressive disorder, in partial remission (HCC)   • Hypertension   • Anemia   • Insomnia due to other mental disorder   • Peripheral polyneuropathy   • B12 deficiency   • Postsurgical malabsorption   • Lymphedema   • CAP (community acquired pneumonia)   • Iron deficiency   • Gastroparesis   • Acute deep vein thrombosis (DVT) of popliteal vein of left lower extremity ([facility])   • Tremor of both hands   • Gastrointestinal hemorrhage   • Acute blood loss anemia   • Pancytopenia (HCC)   • Chronic venous hypertension due to DVT   • COPD (chronic obstructive pulmonary disease) (HCC)   • Bleeding hemorrhoid   • Malabsorption of iron   • Iron deficiency anemia   • Pleuritic chest pain   • History of esophageal cancer   • Abnormal CT scan   • Generalized weakness   • Chronic anticoagulation   • Urinary tract infection due to extended-spectrum beta lactamase (ESBL) producing Escherichia coli   • CKD (chronic kidney disease) stage 2, GFR 60-89 ml/min   • Dysphagia   • PVC's (premature ventricular contractions)   • NSTEMI (non-ST elevated myocardial infarction) ([facility])     Past Medical History:   Diagnosis Date   • Acute deep vein thrombosis (DVT) of popliteal vein of left lower extremity (HCC) 2020   • Anemia    • Anti-phospholipid syndrome (HCC)     On lifelong anticoagulation therapy   • Bladder  disorder     LEAKAGE   ON MED  wers pads   • Bleeding hemorrhoids    • Community acquired pneumonia     HISTORY OF IN 2014   • Deep venous thrombosis (HCC) 2006, 2008    Left lower extremity multiple   • Depression    • Esophageal carcinoma (HCC) 12/31/2014    had chemo and radiation prior surgery   • Hemorrhoids    • HH (hiatus hernia)    • History of atrial fibrillation 2015    ONE EPISODE WHILE HOSPITALIZED   • History of kidney stones    • History of nephrolithiasis    • History of pancreatitis     PT STATES MANY YEARS AGO   • History of radiation therapy    • History of transfusion    • Hypertension    • Long-term (current) use of anticoagulants, INR goal 2.0-3.0    • Lymphedema    • Malignant neoplasm of prostate (HCC)    • Other hyperlipidemia 1/30/2018 January 30, 2018 lipid panel risk 12.8%   • Restless legs syndrome    • Sleep apnea     OCCASIONALLY WEARS CPAP   • Squamous carcinoma     on the head     Past Surgical History:   Procedure Laterality Date   • APPENDECTOMY  1950   • BRONCHOSCOPY      (Diagnostic)   • CATARACT EXTRACTION Bilateral 2014   • COLONOSCOPY  12/15/2014    Complete / Description: EH, IH, torts, stool, follow-up colonoscopy due in 5 years.   • COLONOSCOPY N/A 6/13/2017    non-thrombosed external hemorrhoids, normal examined ileum, IH   • COLONOSCOPY N/A 2/26/2019    Procedure: COLONOSCOPY with Cold Polypectomy;  Surgeon: Mulugeta Millan MD;  Location: [facility] ENDOSCOPY;  Service: Gastroenterology   • COLONOSCOPY N/A 12/16/2020    Procedure: COLONOSCOPY to cecum into TI;  Surgeon: Mesha Sanchez MD;  Location: [facility] ENDOSCOPY;  Service: Gastroenterology;  Laterality: N/A;  pre: lower GI bleed  post: hemorrhoids    • CYSTOSCOPY W/ LASER LITHOTRIPSY     • ENDOSCOPY N/A 6/13/2017    Procedure: ESOPHAGOGASTRODUODENOSCOPY WITH COLD BIOPSY;  Surgeon: Lake Gonzalez MD;  Location: [facility] ENDOSCOPY;  Service:    • ENDOSCOPY N/A 11/18/2021    Procedure: ESOPHAGOGASTRODUODENOSCOPY  WITH  DILATATION WITH FLUOROSCOPY;  Surgeon: Jose Christine III, MD;  Location: [facility] ENDOSCOPY;  Service: Gastroenterology;  Laterality: N/A;  Pre: dysphagia, h/x of adinocarcinoma of esophagus  Post: same   • ESOPHAGECTOMY      April 2015, stage IIB esophageal carcinoma, sub-total resection.   • ESOPHAGECTOMY      Esophagectomy Subtotal Andrea Joe Procedure   • EXCISION LESION  08/2012    Removal of Squamous Cell CA on Head   • HAMMER TOE REPAIR  09/2014    Hammertoe Operation (Each Toe), 10/2014   • HAMMER TOE REPAIR Left 10/3/2017    Procedure: Left second third and fourth distal interphalangeal joint resection with flexor tenotomy;  Surgeon: Mulugeta Lira MD;  Location:  [facility];  Service:    • HEMORRHOIDECTOMY N/A 12/23/2020    Procedure: HEMORRHOIDECTOMY;  Surgeon: Billy Julio Jr., MD;  Location: [facility] MAIN OR;  Service: General;  Laterality: N/A;   • HERNIA REPAIR      incisional   • JEJUNOSTOMY      Laparoscopic   • JEJUNOSTOMY      tube removal    • KNEE SURGERY Bilateral 1967, 1973, 1981   • PATELLA SURGERY Left     removed   • PILONIDAL CYST / SINUS EXCISION     • NV TOTAL KNEE ARTHROPLASTY Right 3/26/2018    Procedure: TOTAL KNEE ARTHROPLASTY;  Surgeon: Renny Solis MD;  Location: [facility] MAIN OR;  Service: Orthopedics   • PROSTATECTOMY  2010   • PYLOROPLASTY     • SPINAL FUSION  02/1998    C 5,6   • TONSILLECTOMY     • UPPER GASTROINTESTINAL ENDOSCOPY  12/15/2014    LA Grade D esophagitis, Pardo's, HH, multiple duodenal ulcers   • VENTRAL/INCISIONAL HERNIA REPAIR N/A 4/14/2016    Procedure: VENTRAL/INCISIONAL HERNIA REPAIR, open, with mesh, and component separation;  Surgeon: Darren Rivas MD;  Location: [facility] MAIN OR;  Service:       General Information     Row Name 05/13/22 1048          Physical Therapy Time and Intention    Document Type evaluation  -CH     Mode of Treatment individual therapy;physical therapy  -CH     Row Name 05/13/22 1048          General  Information    Prior Level of Function independent:;gait;transfer;bed mobility  walks with cane and furniture surfs  -     Existing Precautions/Restrictions fall  -     Barriers to Rehab medically complex;previous functional deficit  -     Row Name 05/13/22 1048          Living Environment    People in Home spouse  -[facility] Name 05/13/22 1048          Cognition    Orientation Status (Cognition) oriented x 3  -     Row Name 05/13/22 1048          Safety Issues, Functional Mobility    Impairments Affecting Function (Mobility) balance;coordination;endurance/activity tolerance;pain;postural/trunk control;strength  -           User Key  (r) = Recorded By, (t) = Taken By, (c) = Cosigned By    Initials Name Provider Type     Stefany Fernandez, PT Physical Therapist               Mobility     Row Name 05/13/22 1048          Bed Mobility    Bed Mobility supine-sit;sit-supine  -     Supine-Sit [place] (Bed Mobility) verbal cues;nonverbal cues (demo/gesture);contact guard  -     Sit-Supine [place] (Bed Mobility) verbal cues;nonverbal cues (demo/gesture);contact guard  -     Assistive Device (Bed Mobility) bed rails;head of bed elevated  -     Row Name 05/13/22 1048          Sit-Stand Transfer    Sit-Stand [place] (Transfers) verbal cues;nonverbal cues (demo/gesture);contact guard  -     Assistive Device (Sit-Stand Transfers) --  HHa  -[facility] Name 05/13/22 1048          Gait/Stairs (Locomotion)    [place] Level (Gait) verbal cues;nonverbal cues (demo/gesture);contact guard  -     Assistive Device (Gait) --  A  -     Distance in Feet (Gait) 200  -     Deviations/Abnormal Patterns (Gait) fj decreased;gait speed decreased;stride length decreased  -     Bilateral Gait Deviations forward flexed posture  -     Left Sided Gait Deviations foot drop/toe drag  -     Comment, (Gait/Stairs) pt with forward flexed posture which increased with increased distance, pt also  with decreased foot clearance B (worse on L than R), pt also with genu valgum ( worse on L than R)  -           User Key  (r) = Recorded By, (t) = Taken By, (c) = Cosigned By    Initials Name Provider Type     Stefany Fernandez, PT Physical Therapist               Obj/Interventions     [facility] Name 05/13/22 1051          Range of Motion Comprehensive    General Range of Motion no range of motion deficits identified  -CH     Row Name 05/13/22 1051          Strength Comprehensive (MMT)    Comment, General Manual Muscle Testing (MMT) Assessment generalized weakness noted with functional mobility  -[facility] Name 05/13/22 1051          Balance    Balance Assessment standing static balance;standing dynamic balance  -     Static Standing Balance contact guard  -     Dynamic Standing Balance contact guard  -           User Key  (r) = Recorded By, (t) = Taken By, (c) = Cosigned By    Initials Name Provider Type     Stefany Fernandez, PT Physical Therapist               Goals/Plan     Row Name 05/13/22 1056          Bed Mobility Goal 1 (PT)    Activity/Assistive Device (Bed Mobility Goal 1, PT) bed mobility activities, all  -CH     [place] Level/Cues Needed (Bed Mobility Goal 1, PT) supervision required  -CH     Time Frame (Bed Mobility Goal 1, PT) 1 week  -CH     Row Name 05/13/22 1056          Transfer Goal 1 (PT)    Activity/Assistive Device (Transfer Goal 1, PT) transfers, all;cane, straight  HHA  -CH     [place] Level/Cues Needed (Transfer Goal 1, PT) supervision required  -CH     Time Frame (Transfer Goal 1, PT) 1 week  -CH     Row Name 05/13/22 1056          Gait Training Goal 1 (PT)    Activity/Assistive Device (Gait Training Goal 1, PT) gait (walking locomotion);cane, straight  -CH     [place] Level (Gait Training Goal 1, PT) supervision required  -     Distance (Gait Training Goal 1, PT) 200  -CH     Time Frame (Gait Training Goal 1, PT) 1 week  -CH     Row Name 05/13/22 1056           Therapy Assessment/Plan (PT)    Planned Therapy Interventions (PT) balance training;bed mobility training;gait training;home exercise program;patient/family education;strengthening;transfer training  -           User Key  (r) = Recorded By, (t) = Taken By, (c) = Cosigned By    Initials Name Provider Type    Stefany Smith, PT Physical Therapist               Clinical Impression     Row Name 05/13/22 1052          Pain    Pretreatment Pain Rating 0/10 - no pain  -     Posttreatment Pain Rating 0/10 - no pain  -     Row Name 05/13/22 1052          Plan of Care Review    Plan of Care Reviewed With patient  -     Outcome Evaluation pt is a 71 yo M who was admitted with abdominal pain, cough, and possible NSTEMI. Pt is scheduled for a cardiac cath this date. Pt presents to PT with impaired functional mobility and gait secondary to generalized weakness, impaired balance, and decreased activity tolerance. Pt reports his L foot drag and forward flexed posture are baseline. Pt with unsteady gait and may benefit from skilled PT to address strength, mobility, balance, and gait.  -     Row Name 05/13/22 1052          Therapy Assessment/Plan (PT)    Patient/Family Therapy Goals Statement (PT) to return to [facility]  -     Rehab Potential (PT) good, to achieve stated therapy goals  -     Criteria for Skilled Interventions Met (PT) skilled treatment is necessary  -     Therapy Frequency (PT) 6 times/wk  -     Row Name 05/13/22 1052          Positioning and Restraints    Pre-Treatment Position in bed  -     Post Treatment Position bed  -     In Bed supine;call light within reach;encouraged to call for assist;exit alarm on  -           User Key  (r) = Recorded By, (t) = Taken By, (c) = Cosigned By    Initials Name Provider Type    Stefany Smith, PT Physical Therapist               Outcome Measures     Row Name 05/13/22 1059 05/13/22 0820       How much help from another person do you currently  need...    Turning from your back to your side while in flat bed without using bedrails? 4  -CH 4  -TB    Moving from lying on back to sitting on the side of a flat bed without bedrails? 4  -CH 4  -TB    Moving to and from a bed to a chair (including a wheelchair)? 3  -CH 4  -TB    Standing up from a chair using your arms (e.g., wheelchair, bedside chair)? 3  -CH 4  -TB    Climbing 3-5 steps with a railing? 2  - 3  -TB    To walk in hospital room? 3  -CH 3  -TB    AM-PAC 6 Clicks Score (PT) 19  - 22  -TB    Highest level of mobility 6 --> Walked 10 steps or more  - 7 --> Walked 25 feet or more  -    Row Name 05/13/22 1059          Functional Assessment    Outcome Measure Options AM-PAC 6 Clicks Basic Mobility (PT)  -           User Key  (r) = Recorded By, (t) = Taken By, (c) = Cosigned By    Initials Name Provider Type     Stefany Fernandez PT Physical Therapist    Escobar Vallejo, RN Registered Nurse                             Physical Therapy Education                 Title: PT OT SLP Therapies (In Progress)     Topic: Physical Therapy (In Progress)     Point: Mobility training (Done)     Learning Progress Summary           Patient Acceptance, E,TB,D, VU,NR by  at 5/13/2022 1100                   Point: Home exercise program (Not Started)     Learner Progress:  Not documented in this visit.          Point: Body mechanics (Done)     Learning Progress Summary           Patient Acceptance, E,TB,D, VU,NR by  at 5/13/2022 1100                   Point: Precautions (Done)     Learning Progress Summary           Patient Acceptance, E,TB,D, VU,NR by  at 5/13/2022 1100                               User Key     Initials Effective Dates Name Provider Type [facility] 06/16/21 -  Stefany Fernandez PT Physical Therapist PT              PT Recommendation and Plan  Planned Therapy Interventions (PT): balance training, bed mobility training, gait training, home exercise program, patient/family  education, strengthening, transfer training  Plan of Care Reviewed With: patient  Outcome Evaluation: pt is a 71 yo M who was admitted with abdominal pain, cough, and possible NSTEMI. Pt is scheduled for a cardiac cath this date. Pt presents to PT with impaired functional mobility and gait secondary to generalized weakness, impaired balance, and decreased activity tolerance. Pt reports his L foot drag and forward flexed posture are baseline. Pt with unsteady gait and may benefit from skilled PT to address strength, mobility, balance, and gait.     Time Calculation:    PT Charges     Row Name 05/13/22 1101             Time Calculation    Start Time 0924  -      Stop Time 0936  -      Time Calculation (min) 12 min  -CH      PT Received On 05/13/22  -      PT - Next Appointment 05/14/22  -      PT Goal Re-Cert Due Date 05/20/22  -              Time Calculation- PT    Total Timed Code Minutes- PT 8 minute(s)  -CH              Timed Charges    48950 - PT Therapeutic Activity Minutes 8  -CH              Untimed Charges    PT Eval/Re-eval Minutes 8  -CH              Total Minutes    Timed Charges Total Minutes 8  -CH      Untimed Charges Total Minutes 8  -CH       Total Minutes 16  -CH            User Key  (r) = Recorded By, (t) = Taken By, (c) = Cosigned By    Initials Name Provider Type     Stefany Fernandez, PT Physical Therapist              Therapy Charges for Today     Code Description Service Date Service Provider Modifiers Qty    29237031608  PT THERAPEUTIC ACT EA 15 MIN 5/13/2022 Stefany Fernandez, PT GP 1    15906174615 HC PT EVAL MOD COMPLEXITY 2 5/13/2022 Stefany Fernandez, PT GP 1          PT G-Codes  Outcome Measure Options: AM-PAC 6 Clicks Basic Mobility (PT)  AM-PAC 6 Clicks Score (PT): 19    Stefany Fernandez PT  5/13/2022

## 2022-05-13 NOTE — PLAN OF CARE
Goal Outcome Evaluation:  Plan of Care Reviewed With: patient        Progress: improving  Outcome Evaluation: VSS, on RA, no c/o chest pain or discomfort, bilateral leg compressions on, Echo today, possible heart cath this weekend, x1 assist for ambulation, wctm

## 2022-05-13 NOTE — THERAPY EVALUATION
Acute Care - Speech Language Pathology   Swallow Initial Evaluation [facility]     Patient Name: Jose Hurtado  : 1950  MRN: [id number]  Today's Date: 2022               Admit Date: 2022    Visit Dx:     ICD-10-CM ICD-9-CM   1. NSTEMI (non-ST elevated myocardial infarction) (HCC)  I21.4 410.70   2. Chronic obstructive pulmonary disease, unspecified COPD type ([facility])  J44.9 496   3. History of esophageal cancer  Z85.01 V10.03     Patient Active Problem List   Diagnosis   • Adenocarcinoma of esophagus (HCC)   • Adenomatous polyp of colon   • Malignant neoplasm of prostate (HCC)   • RLS (restless legs syndrome)   • History of DVT (deep vein thrombosis)   • Recurrent major depressive disorder, in partial remission ([facility])   • Hypertension   • Anemia   • Insomnia due to other mental disorder   • Peripheral polyneuropathy   • B12 deficiency   • Postsurgical malabsorption   • Lymphedema   • CAP (community acquired pneumonia)   • Iron deficiency   • Gastroparesis   • Acute deep vein thrombosis (DVT) of popliteal vein of left lower extremity ([facility])   • Tremor of both hands   • Gastrointestinal hemorrhage   • Acute blood loss anemia   • Pancytopenia ([facility])   • Chronic venous hypertension due to DVT   • COPD (chronic obstructive pulmonary disease) ([facility])   • Bleeding hemorrhoid   • Malabsorption of iron   • Iron deficiency anemia   • Pleuritic chest pain   • History of esophageal cancer   • Abnormal CT scan   • Generalized weakness   • Chronic anticoagulation   • Urinary tract infection due to extended-spectrum beta lactamase (ESBL) producing Escherichia coli   • CKD (chronic kidney disease) stage 2, GFR 60-89 ml/min   • Dysphagia   • PVC's (premature ventricular contractions)   • NSTEMI (non-ST elevated myocardial infarction) ([facility])     Past Medical History:   Diagnosis Date   • Acute deep vein thrombosis (DVT) of popliteal vein of left lower extremity ([facility]) 2020   • Anemia    • Anti-phospholipid syndrome  (HCC)     On lifelong anticoagulation therapy   • Bladder disorder     LEAKAGE   ON MED  wers pads   • Bleeding hemorrhoids    • Community acquired pneumonia     HISTORY OF IN 2014   • Deep venous thrombosis (HCC) 2006, 2008    Left lower extremity multiple   • Depression    • Esophageal carcinoma (HCC) 12/31/2014    had chemo and radiation prior surgery   • Hemorrhoids    • HH (hiatus hernia)    • History of atrial fibrillation 2015    ONE EPISODE WHILE HOSPITALIZED   • History of kidney stones    • History of nephrolithiasis    • History of pancreatitis     PT STATES MANY YEARS AGO   • History of radiation therapy    • History of transfusion    • Hypertension    • Long-term (current) use of anticoagulants, INR goal 2.0-3.0    • Lymphedema    • Malignant neoplasm of prostate (HCC)    • Other hyperlipidemia 1/30/2018 January 30, 2018 lipid panel risk 12.8%   • Restless legs syndrome    • Sleep apnea     OCCASIONALLY WEARS CPAP   • Squamous carcinoma     on the head     Past Surgical History:   Procedure Laterality Date   • APPENDECTOMY  1950   • BRONCHOSCOPY      (Diagnostic)   • CATARACT EXTRACTION Bilateral 2014   • COLONOSCOPY  12/15/2014    Complete / Description: EH, IH, torts, stool, follow-up colonoscopy due in 5 years.   • COLONOSCOPY N/A 6/13/2017    non-thrombosed external hemorrhoids, normal examined ileum, IH   • COLONOSCOPY N/A 2/26/2019    Procedure: COLONOSCOPY with Cold Polypectomy;  Surgeon: Mulugeta Millan MD;  Location: [facility] ENDOSCOPY;  Service: Gastroenterology   • COLONOSCOPY N/A 12/16/2020    Procedure: COLONOSCOPY to cecum into TI;  Surgeon: Mesha Sanchez MD;  Location: [facility] ENDOSCOPY;  Service: Gastroenterology;  Laterality: N/A;  pre: lower GI bleed  post: hemorrhoids    • CYSTOSCOPY W/ LASER LITHOTRIPSY     • ENDOSCOPY N/A 6/13/2017    Procedure: ESOPHAGOGASTRODUODENOSCOPY WITH COLD BIOPSY;  Surgeon: Lake Gonzalez MD;  Location: [facility] ENDOSCOPY;  Service:    •  ENDOSCOPY N/A 11/18/2021    Procedure: ESOPHAGOGASTRODUODENOSCOPY WITH  DILATATION WITH FLUOROSCOPY;  Surgeon: Jose Christine III, MD;  Location: [facility] ENDOSCOPY;  Service: Gastroenterology;  Laterality: N/A;  Pre: dysphagia, h/x of adinocarcinoma of esophagus  Post: same   • ESOPHAGECTOMY      April 2015, stage IIB esophageal carcinoma, sub-total resection.   • ESOPHAGECTOMY      Esophagectomy Subtotal Andrea Joe Procedure   • EXCISION LESION  08/2012    Removal of Squamous Cell CA on Head   • HAMMER TOE REPAIR  09/2014    Hammertoe Operation (Each Toe), 10/2014   • HAMMER TOE REPAIR Left 10/3/2017    Procedure: Left second third and fourth distal interphalangeal joint resection with flexor tenotomy;  Surgeon: Mulugeta Lira MD;  Location:  [facility];  Service:    • HEMORRHOIDECTOMY N/A 12/23/2020    Procedure: HEMORRHOIDECTOMY;  Surgeon: Billy Julio Jr., MD;  Location: [facility] MAIN OR;  Service: General;  Laterality: N/A;   • HERNIA REPAIR      incisional   • JEJUNOSTOMY      Laparoscopic   • JEJUNOSTOMY      tube removal    • KNEE SURGERY Bilateral 1967, 1973, 1981   • PATELLA SURGERY Left     removed   • PILONIDAL CYST / SINUS EXCISION     • HI TOTAL KNEE ARTHROPLASTY Right 3/26/2018    Procedure: TOTAL KNEE ARTHROPLASTY;  Surgeon: Renny Solis MD;  Location: [facility] MAIN OR;  Service: Orthopedics   • PROSTATECTOMY  2010   • PYLOROPLASTY     • SPINAL FUSION  02/1998    C 5,6   • TONSILLECTOMY     • UPPER GASTROINTESTINAL ENDOSCOPY  12/15/2014    LA Grade D esophagitis, Pardo's, HH, multiple duodenal ulcers   • VENTRAL/INCISIONAL HERNIA REPAIR N/A 4/14/2016    Procedure: VENTRAL/INCISIONAL HERNIA REPAIR, open, with mesh, and component separation;  Surgeon: Darren Rivas MD;  Location: [facility] MAIN OR;  Service:        SLP Recommendation and Plan  SLP Swallowing Diagnosis: functional oral phase, functional pharyngeal phase (05/13/22 1400)  SLP Diet Recommendation: soft textures,  chopped, thin liquids (05/13/22 1400)  Recommended Precautions and Strategies: upright posture during/after eating, small bites of food and sips of liquid, other (see comments) (more frequent smaller meals) (05/13/22 1400)  SLP Rec. for Method of Medication Administration: as tolerated (05/13/22 1400)     Monitor for Signs of Aspiration: notify SLP if any concerns (05/13/22 1400)        Anticipated Discharge Disposition (SLP): unknown (05/13/22 1400)     Therapy Frequency (Swallow): PRN (05/13/22 1400)  Predicted Duration Therapy Intervention (Days): until discharge (05/13/22 1400)                                  Plan of Care Reviewed With: patient  Outcome Evaluation: Clinical swallow eval completed.  Pt demonstraatd no s/s aspsiration with thinliquid by cup/straw, puree, soft/hard solids or mixed consisteency.  Adequate laryngeal elevation, mastication,and oral clearance.  Pt took 4 ounces of applesauce, peaches, 2 crackerss and 6 oounces of water.  No coughing 20 minutes following ingestion.  REC regular chopped meats, thin liquids.  Meds as tolerated.  Upright duringn and 1-2 hours aftermeals.  Also discussed more frequent smaller meals.      SWALLOW EVALUATION (last 72 hours)     SLP Adult Swallow Evaluation     Row Name 05/13/22 1400                   Rehab Evaluation    Document Type evaluation  -SA        Subjective Information no complaints  -SA        Patient Observations alert;cooperative  -SA        Patient/Family/Caregiver Comments/Observations wife bedside  -SA        Patient Effort good  -SA        Symptoms Noted During/After Treatment none  -SA                  General Information    Patient Profile Reviewed yes  -SA        Pertinent History Of Current Problem adenocarcinoma of esophagus s/p esophagectomy (2015), esophagogastroduodenocscopy w/ dilation w/ fluoroscopy, (2021), gastroparesis, COPD,  -SA        Current Method of Nutrition regular textures;thin liquids  -SA         Precautions/Limitations, Vision WFL;for purposes of eval  -SA        Precautions/Limitations, Hearing hearing impairment, bilaterally  -SA        Prior Level of Function-Communication WFL  -SA        Prior Level of Function-Swallowing no diet consistency restrictions  -SA        Plans/Goals Discussed with patient and family;agreed upon  -        Barriers to Rehab medically complex  -SA        Patient's Goals for Discharge patient did not state  -SA        Family Goals for Discharge family did not state  -SA                  Pain Scale: Numbers Pre/Post-Treatment    Pretreatment Pain Rating 0/10 - no pain  -SA        Posttreatment Pain Rating 0/10 - no pain  -SA                  Clinical Swallow Eval    Oral Prep Phase WFL  -SA        Oral Transit WFL  -SA        Oral Residue WFL  -SA        Pharyngeal Phase no overt signs/symptoms of pharyngeal impairment  -SA        Esophageal Phase --  no overt s/s delayed aspiration ~20 minutes following 12-15oz po intake  -SA                  SLP Evaluation Clinical Impression    SLP Swallowing Diagnosis functional oral phase;functional pharyngeal phase  -SA        Functional Impact risk of aspiration/pneumonia  -SA                  Recommendations    Therapy Frequency (Swallow) PRN  -SA        Predicted Duration Therapy Intervention (Days) until discharge  -SA        SLP Diet Recommendation soft textures;chopped;thin liquids  -SA        Recommended Precautions and Strategies upright posture during/after eating;small bites of food and sips of liquid;other (see comments)  more frequent smaller meals  -SA        Oral Care Recommendations Oral Care BID/PRN  -SA        SLP Rec. for Method of Medication Administration as tolerated  -        Monitor for Signs of Aspiration notify SLP if any concerns  -SA        Anticipated Discharge Disposition (SLP) unknown  -SA              User Key  (r) = Recorded By, (t) = Taken By, (c) = Cosigned By    Initials Name Effective Dates      Savannah Eagle MS CCC-SLP 06/16/21 -                 EDUCATION  The patient has been educated in the following areas:   Dysphagia (Swallowing Impairment) Modified Diet Instruction.         SLP Outcome Measures (last 72 hours)     SLP Outcome Measures     Row Name 05/13/22 1500             SLP Outcome Measures    Outcome Measure Used? Adult NOMS  -              Adult FCM Scores    FCM Chosen Swallowing  -      Swallowing FCM Score 4  -            User Key  (r) = Recorded By, (t) = Taken By, (c) = Cosigned By    Initials Name Effective Dates    Savannah Terry MS CCC-SLP 06/16/21 -                  Time Calculation:    Time Calculation- SLP     Row Name 05/13/22 1522             Time Calculation- SLP    SLP Start Time 1430  -            User Key  (r) = Recorded By, (t) = Taken By, (c) = Cosigned By    Initials Name Provider Type    Savannah Terry MS CCC-SLP Speech and Language Pathologist                Therapy Charges for Today     Code Description Service Date Service Provider Modifiers Qty    [id number] HC ST EVAL ORAL PHARYNG SWALLOW 4 5/13/2022 Savannah Eagle MS CCC-SLP GN 1               Savannah Eagle MS CCC-MULUGETA  5/13/2022

## 2022-05-14 LAB
ANION GAP SERPL CALCULATED.3IONS-SCNC: 9 MMOL/L (ref 5–15)
BACTERIA SPEC AEROBE CULT: NORMAL
BASOPHILS # BLD AUTO: 0.02 10*3/MM3 (ref 0–0.2)
BASOPHILS NFR BLD AUTO: 0.4 % (ref 0–1.5)
BUN SERPL-MCNC: 18 MG/DL (ref 8–23)
BUN/CREAT SERPL: 15.3 (ref 7–25)
CALCIUM SPEC-SCNC: 8.5 MG/DL (ref 8.6–10.5)
CHLORIDE SERPL-SCNC: 106 MMOL/L (ref 98–107)
CO2 SERPL-SCNC: 23 MMOL/L (ref 22–29)
CREAT SERPL-MCNC: 1.18 MG/DL (ref 0.76–1.27)
DEPRECATED RDW RBC AUTO: 44.2 FL (ref 37–54)
EGFRCR SERPLBLD CKD-EPI 2021: 64.8 ML/MIN/1.73
EOSINOPHIL # BLD AUTO: 0.29 10*3/MM3 (ref 0–0.4)
EOSINOPHIL NFR BLD AUTO: 6.1 % (ref 0.3–6.2)
ERYTHROCYTE [DISTWIDTH] IN BLOOD BY AUTOMATED COUNT: 13.6 % (ref 12.3–15.4)
GLUCOSE SERPL-MCNC: 108 MG/DL (ref 65–99)
HCT VFR BLD AUTO: 33.5 % (ref 37.5–51)
HGB BLD-MCNC: 11.4 G/DL (ref 13–17.7)
IMM GRANULOCYTES # BLD AUTO: 0.02 10*3/MM3 (ref 0–0.05)
IMM GRANULOCYTES NFR BLD AUTO: 0.4 % (ref 0–0.5)
LYMPHOCYTES # BLD AUTO: 0.98 10*3/MM3 (ref 0.7–3.1)
LYMPHOCYTES NFR BLD AUTO: 20.5 % (ref 19.6–45.3)
MCH RBC QN AUTO: 30.4 PG (ref 26.6–33)
MCHC RBC AUTO-ENTMCNC: 34 G/DL (ref 31.5–35.7)
MCV RBC AUTO: 89.3 FL (ref 79–97)
MONOCYTES # BLD AUTO: 0.37 10*3/MM3 (ref 0.1–0.9)
MONOCYTES NFR BLD AUTO: 7.7 % (ref 5–12)
NEUTROPHILS NFR BLD AUTO: 3.11 10*3/MM3 (ref 1.7–7)
NEUTROPHILS NFR BLD AUTO: 64.9 % (ref 42.7–76)
NRBC BLD AUTO-RTO: 0.2 /100 WBC (ref 0–0.2)
PLATELET # BLD AUTO: 127 10*3/MM3 (ref 140–450)
PMV BLD AUTO: 8.9 FL (ref 6–12)
POTASSIUM SERPL-SCNC: 4.3 MMOL/L (ref 3.5–5.2)
RBC # BLD AUTO: 3.75 10*6/MM3 (ref 4.14–5.8)
SODIUM SERPL-SCNC: 138 MMOL/L (ref 136–145)
TROPONIN T SERPL-MCNC: 0.07 NG/ML (ref 0–0.03)
TROPONIN T SERPL-MCNC: 0.07 NG/ML (ref 0–0.03)
TROPONIN T SERPL-MCNC: 0.08 NG/ML (ref 0–0.03)
TROPONIN T SERPL-MCNC: 0.1 NG/ML (ref 0–0.03)
WBC NRBC COR # BLD: 4.79 10*3/MM3 (ref 3.4–10.8)

## 2022-05-14 PROCEDURE — A9270 NON-COVERED ITEM OR SERVICE: HCPCS | Performed by: INTERNAL MEDICINE

## 2022-05-14 PROCEDURE — 25010000002 MIDAZOLAM PER 1 MG: Performed by: INTERNAL MEDICINE

## 2022-05-14 PROCEDURE — G0378 HOSPITAL OBSERVATION PER HR: HCPCS

## 2022-05-14 PROCEDURE — 63710000001 PRAMIPEXOLE 1.5 MG TABLET: Performed by: INTERNAL MEDICINE

## 2022-05-14 PROCEDURE — 25010000002 HEPARIN (PORCINE) PER 1000 UNITS: Performed by: INTERNAL MEDICINE

## 2022-05-14 PROCEDURE — 63710000001 FUROSEMIDE 20 MG TABLET: Performed by: INTERNAL MEDICINE

## 2022-05-14 PROCEDURE — 84484 ASSAY OF TROPONIN QUANT: CPT | Performed by: STUDENT IN AN ORGANIZED HEALTH CARE EDUCATION/TRAINING PROGRAM

## 2022-05-14 PROCEDURE — 63710000001 ATORVASTATIN 20 MG TABLET: Performed by: INTERNAL MEDICINE

## 2022-05-14 PROCEDURE — 25010000002 FENTANYL CITRATE (PF) 50 MCG/ML SOLUTION: Performed by: INTERNAL MEDICINE

## 2022-05-14 PROCEDURE — C1769 GUIDE WIRE: HCPCS | Performed by: INTERNAL MEDICINE

## 2022-05-14 PROCEDURE — 0 IOPAMIDOL PER 1 ML: Performed by: INTERNAL MEDICINE

## 2022-05-14 PROCEDURE — 93458 L HRT ARTERY/VENTRICLE ANGIO: CPT | Performed by: INTERNAL MEDICINE

## 2022-05-14 PROCEDURE — 63710000001 PANTOPRAZOLE 40 MG TABLET DELAYED-RELEASE: Performed by: INTERNAL MEDICINE

## 2022-05-14 PROCEDURE — 63710000001 METOPROLOL TARTRATE 25 MG TABLET: Performed by: INTERNAL MEDICINE

## 2022-05-14 PROCEDURE — C1894 INTRO/SHEATH, NON-LASER: HCPCS | Performed by: INTERNAL MEDICINE

## 2022-05-14 PROCEDURE — 63710000001 ASPIRIN 81 MG TABLET DELAYED-RELEASE: Performed by: INTERNAL MEDICINE

## 2022-05-14 PROCEDURE — 63710000001 POTASSIUM CHLORIDE 10 MEQ TABLET CONTROLLED-RELEASE: Performed by: INTERNAL MEDICINE

## 2022-05-14 PROCEDURE — 85025 COMPLETE CBC W/AUTO DIFF WBC: CPT | Performed by: STUDENT IN AN ORGANIZED HEALTH CARE EDUCATION/TRAINING PROGRAM

## 2022-05-14 PROCEDURE — 80048 BASIC METABOLIC PNL TOTAL CA: CPT | Performed by: STUDENT IN AN ORGANIZED HEALTH CARE EDUCATION/TRAINING PROGRAM

## 2022-05-14 PROCEDURE — 63710000001 MIRABEGRON ER 25 MG TABLET SUSTAINED-RELEASE 24 HOUR: Performed by: INTERNAL MEDICINE

## 2022-05-14 PROCEDURE — 63710000001 PAROXETINE 20 MG TABLET: Performed by: INTERNAL MEDICINE

## 2022-05-14 PROCEDURE — 99213 OFFICE O/P EST LOW 20 MIN: CPT | Performed by: INTERNAL MEDICINE

## 2022-05-14 RX ORDER — MIDAZOLAM HYDROCHLORIDE 1 MG/ML
INJECTION INTRAMUSCULAR; INTRAVENOUS AS NEEDED
Status: DISCONTINUED | OUTPATIENT
Start: 2022-05-14 | End: 2022-05-14 | Stop reason: HOSPADM

## 2022-05-14 RX ORDER — HYDROCODONE BITARTRATE AND ACETAMINOPHEN 5; 325 MG/1; MG/1
1 TABLET ORAL EVERY 4 HOURS PRN
Status: DISCONTINUED | OUTPATIENT
Start: 2022-05-14 | End: 2022-05-15 | Stop reason: HOSPADM

## 2022-05-14 RX ORDER — ASPIRIN 81 MG/1
81 TABLET ORAL DAILY
Status: DISCONTINUED | OUTPATIENT
Start: 2022-05-14 | End: 2022-05-15

## 2022-05-14 RX ORDER — LIDOCAINE HYDROCHLORIDE 20 MG/ML
INJECTION, SOLUTION INFILTRATION; PERINEURAL AS NEEDED
Status: DISCONTINUED | OUTPATIENT
Start: 2022-05-14 | End: 2022-05-14 | Stop reason: HOSPADM

## 2022-05-14 RX ORDER — ONDANSETRON 2 MG/ML
4 INJECTION INTRAMUSCULAR; INTRAVENOUS EVERY 6 HOURS PRN
Status: DISCONTINUED | OUTPATIENT
Start: 2022-05-14 | End: 2022-05-15 | Stop reason: HOSPADM

## 2022-05-14 RX ORDER — ONDANSETRON 4 MG/1
4 TABLET, FILM COATED ORAL EVERY 6 HOURS PRN
Status: DISCONTINUED | OUTPATIENT
Start: 2022-05-14 | End: 2022-05-15 | Stop reason: HOSPADM

## 2022-05-14 RX ORDER — SODIUM CHLORIDE 9 MG/ML
INJECTION, SOLUTION INTRAVENOUS CONTINUOUS PRN
Status: COMPLETED | OUTPATIENT
Start: 2022-05-14 | End: 2022-05-14

## 2022-05-14 RX ORDER — ATORVASTATIN CALCIUM 20 MG/1
40 TABLET, FILM COATED ORAL NIGHTLY
Status: DISCONTINUED | OUTPATIENT
Start: 2022-05-14 | End: 2022-05-15 | Stop reason: HOSPADM

## 2022-05-14 RX ORDER — ACETAMINOPHEN 325 MG/1
650 TABLET ORAL EVERY 4 HOURS PRN
Status: DISCONTINUED | OUTPATIENT
Start: 2022-05-14 | End: 2022-05-15 | Stop reason: HOSPADM

## 2022-05-14 RX ORDER — SODIUM CHLORIDE 9 MG/ML
50 INJECTION, SOLUTION INTRAVENOUS CONTINUOUS
Status: ACTIVE | OUTPATIENT
Start: 2022-05-14 | End: 2022-05-14

## 2022-05-14 RX ORDER — FENTANYL CITRATE 50 UG/ML
INJECTION, SOLUTION INTRAMUSCULAR; INTRAVENOUS AS NEEDED
Status: DISCONTINUED | OUTPATIENT
Start: 2022-05-14 | End: 2022-05-14 | Stop reason: HOSPADM

## 2022-05-14 RX ADMIN — FUROSEMIDE 20 MG: 20 TABLET ORAL at 13:20

## 2022-05-14 RX ADMIN — POTASSIUM CHLORIDE 10 MEQ: 10 TABLET, EXTENDED RELEASE ORAL at 13:19

## 2022-05-14 RX ADMIN — PAROXETINE HYDROCHLORIDE HEMIHYDRATE 40 MG: 20 TABLET, FILM COATED ORAL at 13:20

## 2022-05-14 RX ADMIN — METOPROLOL TARTRATE 25 MG: 25 TABLET, FILM COATED ORAL at 20:09

## 2022-05-14 RX ADMIN — MIRABEGRON 50 MG: 25 TABLET, FILM COATED, EXTENDED RELEASE ORAL at 13:20

## 2022-05-14 RX ADMIN — PANTOPRAZOLE SODIUM 40 MG: 40 TABLET, DELAYED RELEASE ORAL at 13:19

## 2022-05-14 RX ADMIN — ATORVASTATIN CALCIUM 40 MG: 20 TABLET, FILM COATED ORAL at 20:09

## 2022-05-14 RX ADMIN — ASPIRIN 81 MG: 81 TABLET, COATED ORAL at 13:19

## 2022-05-14 RX ADMIN — PRAMIPEXOLE DIHYDROCHLORIDE 3 MG: 1.5 TABLET ORAL at 20:09

## 2022-05-14 NOTE — PROGRESS NOTES
Brief GI note  GI was consulted for consideration of EGD.  He was most recently scoped by Dr. Christine in November 2021, history notable for esophageal adenocarcinoma status post esophagectomy.     If EGD is desired this admission for his history of esophageal adenocarcinoma, would defer to thoracic surgery for this    Discussed with nurse and Dr. Julio.    Verito Kumar MD  [facility]

## 2022-05-14 NOTE — PLAN OF CARE
Goal Outcome Evaluation:  Plan of Care Reviewed With: patient        Progress: improving  Outcome Evaluation: Sinus Travis on monitor held beta blockers today, other VSS, Cath today no interventions Right Radial site CDI, no c/o pain or discomfort, troponin trending down, WCTM

## 2022-05-14 NOTE — PROGRESS NOTES
"Patient Care Team:  Brandin Bolton MD as PCP - General (Internal Medicine)  Tapan, George THORPE II, MD as Consulting Physician (Hematology and Oncology)  Jose Inman MD as Consulting Physician (Urology)  Mulugeta Millan MD as Consulting Physician (Gastroenterology)  Jose Christine III, MD as Referring Physician (Thoracic Surgery)  Seth Randhawa MD as Consulting Physician (Ophthalmology)  James Cyr MD as Consulting Physician (Cardiology)    Chief Complaint: Follow-up non-ST elevation MI.    Interval History:   Troponin downtrending.  No chest pain currently.    Objective   Vital Signs  Temp:  [97.3 °F (36.3 °C)-98.9 °F (37.2 °C)] 97.3 °F (36.3 °C)  Heart Rate:  [58-66] 58  Resp:  [18] 18  BP: (119-139)/(68-83) 139/83    Intake/Output Summary (Last 24 hours) at 5/14/2022 0945  Last data filed at 5/14/2022 0750  Gross per 24 hour   Intake 120 ml   Output --   Net 120 ml     Flowsheet Rows    Flowsheet Row First Filed Value   Admission Height 195.6 cm (77\") Documented at 05/12/2022 2104   Admission Weight 93.3 kg (205 lb 9.6 oz) Documented at 05/12/2022 2002          Temp:  [97.3 °F (36.3 °C)-98.9 °F (37.2 °C)] 97.3 °F (36.3 °C)  Heart Rate:  [58-66] 58  Resp:  [18] 18  BP: (119-139)/(68-83) 139/83    Intake/Output Summary (Last 24 hours) at 5/14/2022 0945  Last data filed at 5/14/2022 0750  Gross per 24 hour   Intake 120 ml   Output --   Net 120 ml     Flowsheet Rows    Flowsheet Row First Filed Value   Admission Height 195.6 cm (77\") Documented at 05/12/2022 2104   Admission Weight 93.3 kg (205 lb 9.6 oz) Documented at 05/12/2022 2002          General Appearance:    Alert, cooperative, in no acute distress   Head:    Normocephalic, without obvious abnormality, atraumatic       Neck/Lymph   No adenopathy, supple, no thyromegaly, no carotid bruit, no    JVD   Lungs:     Clear to auscultation bilaterally, no wheezes, rales, or     rhonchi    Cardiac:    Normal rate, regular rhythm, no murmur, no " rub, no gallop   Chest Wall:    No abnormalities observed   GI:     Normal bowel sounds, soft, nontender, nondistended,            no rebound tenderness   Extremities:   No cyanosis, clubbing, or edema   Circulatory/Peripheral Vascular :   Pulses palpable and equal bilaterally   Integumentary:   No bleeding or rash. Normal temperature       Neurologic:   Cranial nerves 2 - 12 grossly intact, sensation intact              furosemide, 20 mg, Oral, Daily  metoprolol tartrate, 25 mg, Oral, BID  Mirabegron ER, 50 mg, Oral, Daily  pantoprazole, 40 mg, Oral, QAM AC  PARoxetine, 40 mg, Oral, QAM  potassium chloride, 10 mEq, Oral, Daily  pramipexole, 3 mg, Oral, Nightly             Results Review:    Results from last 7 days   Lab Units 05/14/22  0603   SODIUM mmol/L 138   POTASSIUM mmol/L 4.3   CHLORIDE mmol/L 106   CO2 mmol/L 23.0   BUN mg/dL 18   CREATININE mg/dL 1.18   GLUCOSE mg/dL 108*   CALCIUM mg/dL 8.5*     Results from last 7 days   Lab Units 05/14/22  0603 05/13/22  2346 05/13/22  1752   TROPONIN T ng/mL 0.084* 0.096* 0.102*     Results from last 7 days   Lab Units 05/14/22  0603   WBC 10*3/mm3 4.79   HEMOGLOBIN g/dL 11.4*   HEMATOCRIT % 33.5*   PLATELETS 10*3/mm3 127*                     @LABRCNT(bnp)@  I reviewed the patient's new clinical results.  I personally viewed and interpreted the patient's EKG/Telemetry data            Assessment & Plan   1.  Non-ST elevation MI  2.  Chest pain  3.  Chronic DVT/PE: On Eliquis at home  4.  Essential hypertension  5.  History of esophageal cancer  6.  Retching/nausea vomiting    -Continue metoprolol.  -Aspirin and statin added.  -Plan on catheterization today.        Catheterization performed.  No coronary artery disease.  Normal ejection fraction.  No additional cardiac work-up at this time.  Restart Eliquis 48 hours after catheterization.

## 2022-05-15 ENCOUNTER — READMISSION MANAGEMENT (OUTPATIENT)
Dept: CALL CENTER | Facility: HOSPITAL | Age: 72
End: 2022-05-15

## 2022-05-15 VITALS
HEIGHT: 77 IN | BODY MASS INDEX: 24.25 KG/M2 | DIASTOLIC BLOOD PRESSURE: 71 MMHG | HEART RATE: 61 BPM | WEIGHT: 205.4 LBS | TEMPERATURE: 97.3 F | SYSTOLIC BLOOD PRESSURE: 141 MMHG | OXYGEN SATURATION: 96 % | RESPIRATION RATE: 16 BRPM

## 2022-05-15 LAB
ANION GAP SERPL CALCULATED.3IONS-SCNC: 8 MMOL/L (ref 5–15)
BUN SERPL-MCNC: 19 MG/DL (ref 8–23)
BUN/CREAT SERPL: 14.4 (ref 7–25)
CALCIUM SPEC-SCNC: 8.5 MG/DL (ref 8.6–10.5)
CHLORIDE SERPL-SCNC: 106 MMOL/L (ref 98–107)
CO2 SERPL-SCNC: 23 MMOL/L (ref 22–29)
CREAT SERPL-MCNC: 1.32 MG/DL (ref 0.76–1.27)
DEPRECATED RDW RBC AUTO: 47.9 FL (ref 37–54)
EGFRCR SERPLBLD CKD-EPI 2021: 56.6 ML/MIN/1.73
ERYTHROCYTE [DISTWIDTH] IN BLOOD BY AUTOMATED COUNT: 14 % (ref 12.3–15.4)
GLUCOSE SERPL-MCNC: 111 MG/DL (ref 65–99)
HCT VFR BLD AUTO: 35 % (ref 37.5–51)
HGB BLD-MCNC: 11.6 G/DL (ref 13–17.7)
MCH RBC QN AUTO: 30.5 PG (ref 26.6–33)
MCHC RBC AUTO-ENTMCNC: 33.1 G/DL (ref 31.5–35.7)
MCV RBC AUTO: 92.1 FL (ref 79–97)
PLATELET # BLD AUTO: 142 10*3/MM3 (ref 140–450)
PMV BLD AUTO: 9.2 FL (ref 6–12)
POTASSIUM SERPL-SCNC: 4.2 MMOL/L (ref 3.5–5.2)
RBC # BLD AUTO: 3.8 10*6/MM3 (ref 4.14–5.8)
SODIUM SERPL-SCNC: 137 MMOL/L (ref 136–145)
WBC NRBC COR # BLD: 4.78 10*3/MM3 (ref 3.4–10.8)

## 2022-05-15 PROCEDURE — G0378 HOSPITAL OBSERVATION PER HR: HCPCS

## 2022-05-15 PROCEDURE — A9270 NON-COVERED ITEM OR SERVICE: HCPCS | Performed by: INTERNAL MEDICINE

## 2022-05-15 PROCEDURE — 80048 BASIC METABOLIC PNL TOTAL CA: CPT | Performed by: NURSE PRACTITIONER

## 2022-05-15 PROCEDURE — 99213 OFFICE O/P EST LOW 20 MIN: CPT | Performed by: NURSE PRACTITIONER

## 2022-05-15 PROCEDURE — 63710000001 ASPIRIN 81 MG TABLET DELAYED-RELEASE: Performed by: INTERNAL MEDICINE

## 2022-05-15 PROCEDURE — 99213 OFFICE O/P EST LOW 20 MIN: CPT | Performed by: THORACIC SURGERY (CARDIOTHORACIC VASCULAR SURGERY)

## 2022-05-15 PROCEDURE — 85027 COMPLETE CBC AUTOMATED: CPT | Performed by: NURSE PRACTITIONER

## 2022-05-15 PROCEDURE — 97530 THERAPEUTIC ACTIVITIES: CPT

## 2022-05-15 PROCEDURE — 63710000001 PAROXETINE 20 MG TABLET: Performed by: INTERNAL MEDICINE

## 2022-05-15 PROCEDURE — 63710000001 MIRABEGRON ER 25 MG TABLET SUSTAINED-RELEASE 24 HOUR: Performed by: INTERNAL MEDICINE

## 2022-05-15 PROCEDURE — 97116 GAIT TRAINING THERAPY: CPT

## 2022-05-15 PROCEDURE — 63710000001 PANTOPRAZOLE 40 MG TABLET DELAYED-RELEASE: Performed by: INTERNAL MEDICINE

## 2022-05-15 PROCEDURE — 63710000001 FUROSEMIDE 20 MG TABLET: Performed by: INTERNAL MEDICINE

## 2022-05-15 PROCEDURE — 63710000001 POTASSIUM CHLORIDE 10 MEQ TABLET CONTROLLED-RELEASE: Performed by: INTERNAL MEDICINE

## 2022-05-15 RX ORDER — ATORVASTATIN CALCIUM 40 MG/1
40 TABLET, FILM COATED ORAL NIGHTLY
Qty: 90 TABLET | Refills: 0 | Status: SHIPPED | OUTPATIENT
Start: 2022-05-15 | End: 2022-09-21 | Stop reason: SDUPTHER

## 2022-05-15 RX ORDER — FUROSEMIDE 20 MG/1
20 TABLET ORAL DAILY
Qty: 30 TABLET | Refills: 5 | Status: SHIPPED | OUTPATIENT
Start: 2022-05-17 | End: 2022-05-20 | Stop reason: SDUPTHER

## 2022-05-15 RX ORDER — ATORVASTATIN CALCIUM 20 MG/1
40 TABLET, FILM COATED ORAL DAILY
Qty: 90 TABLET | Refills: 0 | Status: SHIPPED | OUTPATIENT
Start: 2022-05-15 | End: 2022-09-21

## 2022-05-15 RX ADMIN — ASPIRIN 81 MG: 81 TABLET, COATED ORAL at 08:50

## 2022-05-15 RX ADMIN — FUROSEMIDE 20 MG: 20 TABLET ORAL at 08:50

## 2022-05-15 RX ADMIN — MIRABEGRON 50 MG: 25 TABLET, FILM COATED, EXTENDED RELEASE ORAL at 08:50

## 2022-05-15 RX ADMIN — PAROXETINE HYDROCHLORIDE HEMIHYDRATE 40 MG: 20 TABLET, FILM COATED ORAL at 06:43

## 2022-05-15 RX ADMIN — PANTOPRAZOLE SODIUM 40 MG: 40 TABLET, DELAYED RELEASE ORAL at 06:43

## 2022-05-15 RX ADMIN — POTASSIUM CHLORIDE 10 MEQ: 10 TABLET, EXTENDED RELEASE ORAL at 08:50

## 2022-05-15 NOTE — PLAN OF CARE
Goal Outcome Evaluation:  Plan of Care Reviewed With: patient, spouse        Progress: improving  Outcome Evaluation: Patient's activity is improving and he is performing some higher level, standing balance exercises.  He is motivated to work with PT.  Nearing completion of PT goals. Patient was intermittently wearing a face mask during this therapy encounter. Therapist used appropriate personal protective equipment including eye protection, mask, and gloves.  Mask used was standard procedure mask. Appropriate PPE was worn during the entire therapy session. Hand hygiene was completed before and after therapy session. Patient is not in enhanced droplet precautions.

## 2022-05-15 NOTE — CONSULTS
Initial Hospital Consult    Reason for consult: Chest pain, history of esophageal adenocarcinoma status post [place] Joe esophagectomy  Requesting physician: Dr. Julio    Chief complaint: Chest pain    Subjective     History of Present Illness  Mr. Hurtado is a very pleasant 72-year-old gentleman status post [place] Joe esophagectomy in April 2015 for an esophageal adenocarcinoma.  Since that time he states that periodically he will wake up with coughing and significant chest pain.  During 1 of these episodes he presented to the hospital and was found to have chest pain and elevated troponin.    He is having no difficulty with eating.  He is able to eat whatever he wants.  He does state that he eats approximately 30 minutes prior to going to bed and he does not use a wedge pillow.  He states that he frequently wakes up coughing.  Review of Systems   Constitutional: Negative.    HENT: Negative.    Eyes: Negative.    Respiratory: Positive for cough.    Cardiovascular: Positive for chest pain.   Gastrointestinal: Negative.    Endocrine: Negative.    Genitourinary: Negative.    Musculoskeletal: Negative.    Skin: Negative.    Allergic/Immunologic: Negative.    Neurological: Negative.    Hematological: Negative.    Psychiatric/Behavioral: Negative.         Past Medical History:   Diagnosis Date   • Acute deep vein thrombosis (DVT) of popliteal vein of left lower extremity (HCC) 03/24/2020   • Anemia    • Anti-phospholipid syndrome (HCC)     On lifelong anticoagulation therapy   • Bladder disorder     LEAKAGE   ON MED  wers pads   • Bleeding hemorrhoids    • Community acquired pneumonia     HISTORY OF IN 2014   • Deep venous thrombosis (HCC) 2006, 2008    Left lower extremity multiple   • Depression    • Esophageal carcinoma (HCC) 12/31/2014    had chemo and radiation prior surgery   • Hemorrhoids    • HH (hiatus hernia)    • History of atrial fibrillation 2015    ONE EPISODE WHILE HOSPITALIZED   • History of kidney  stones    • History of nephrolithiasis    • History of pancreatitis     PT STATES MANY YEARS AGO   • History of radiation therapy    • History of transfusion    • Hypertension    • Long-term (current) use of anticoagulants, INR goal 2.0-3.0    • Lymphedema    • Malignant neoplasm of prostate (HCC)    • Other hyperlipidemia 1/30/2018 January 30, 2018 lipid panel risk 12.8%   • Restless legs syndrome    • Sleep apnea     OCCASIONALLY WEARS CPAP   • Squamous carcinoma     on the head   ,   Past Surgical History:   Procedure Laterality Date   • APPENDECTOMY  1950   • BRONCHOSCOPY      (Diagnostic)   • CATARACT EXTRACTION Bilateral 2014   • COLONOSCOPY  12/15/2014    Complete / Description: EH, IH, torts, stool, follow-up colonoscopy due in 5 years.   • COLONOSCOPY N/A 6/13/2017    non-thrombosed external hemorrhoids, normal examined ileum, IH   • COLONOSCOPY N/A 2/26/2019    Procedure: COLONOSCOPY with Cold Polypectomy;  Surgeon: Mulugeta Millan MD;  Location: [facility] ENDOSCOPY;  Service: Gastroenterology   • COLONOSCOPY N/A 12/16/2020    Procedure: COLONOSCOPY to cecum into TI;  Surgeon: Mesha Sanchez MD;  Location: [facility] ENDOSCOPY;  Service: Gastroenterology;  Laterality: N/A;  pre: lower GI bleed  post: hemorrhoids    • CYSTOSCOPY W/ LASER LITHOTRIPSY     • ENDOSCOPY N/A 6/13/2017    Procedure: ESOPHAGOGASTRODUODENOSCOPY WITH COLD BIOPSY;  Surgeon: Lake Gonzalez MD;  Location: [facility] ENDOSCOPY;  Service:    • ENDOSCOPY N/A 11/18/2021    Procedure: ESOPHAGOGASTRODUODENOSCOPY WITH  DILATATION WITH FLUOROSCOPY;  Surgeon: Jose Christine III, MD;  Location: [facility] ENDOSCOPY;  Service: Gastroenterology;  Laterality: N/A;  Pre: dysphagia, h/x of adinocarcinoma of esophagus  Post: same   • ESOPHAGECTOMY      April 2015, stage IIB esophageal carcinoma, sub-total resection.   • ESOPHAGECTOMY      Esophagectomy Subtotal [place] Joe Procedure   • EXCISION LESION  08/2012    Removal of Squamous Cell CA on Head    • HAMMER TOE REPAIR  2014    Hammertoe Operation (Each Toe), 10/2014   • HAMMER TOE REPAIR Left 10/3/2017    Procedure: Left second third and fourth distal interphalangeal joint resection with flexor tenotomy;  Surgeon: Mulugeta Lira MD;  Location: [facility];  Service:    • HEMORRHOIDECTOMY N/A 2020    Procedure: HEMORRHOIDECTOMY;  Surgeon: Billy Julio Jr., MD;  Location: [facility] OR;  Service: General;  Laterality: N/A;   • HERNIA REPAIR      incisional   • JEJUNOSTOMY      Laparoscopic   • JEJUNOSTOMY      tube removal    • KNEE SURGERY Bilateral , ,    • PATELLA SURGERY Left     removed   • PILONIDAL CYST / SINUS EXCISION     • [place] TOTAL KNEE ARTHROPLASTY Right 3/26/2018    Procedure: TOTAL KNEE ARTHROPLASTY;  Surgeon: Renny Solis MD;  Location: [facility];  Service: Orthopedics   • PROSTATECTOMY     • PYLOROPLASTY     • SPINAL FUSION  1998    C 5,6   • TONSILLECTOMY     • UPPER GASTROINTESTINAL ENDOSCOPY  12/15/2014    LA Grade D esophagitis, Pardo's, HH, multiple duodenal ulcers   • VENTRAL/INCISIONAL HERNIA REPAIR N/A 2016    Procedure: VENTRAL/INCISIONAL HERNIA REPAIR, open, with mesh, and component separation;  Surgeon: Darren Rivas MD;  Location: [facility];  Service:    ,   Family History   Problem Relation Age of Onset   • Cerebral aneurysm Mother         cerebral artery aneurysm ( age 56)   • Prostate cancer Brother 68   • Anxiety disorder Father    • Suicide Attempts Father          of suicide   • Cancer Father         bladder   • No Known Problems Brother    • Pancreatic cancer Nephew    • Colon cancer Neg Hx    • Esophageal cancer Neg Hx    • Dementia Neg Hx    • Malig Hyperthermia Neg Hx    ,   Social History     Socioeconomic History   • Marital status:      Spouse name: Lena   • Number of children: 0   • Years of education: College   Tobacco Use   • Smoking status: Former Smoker     Packs/day: 2.00      "Years: 3.00     Pack years: 6.00     Types: Cigarettes     Quit date:      Years since quittin.4   • Smokeless tobacco: Former User     Types: Chew   • Tobacco comment: no caffeine    Vaping Use   • Vaping Use: Never used   Substance and Sexual Activity   • Alcohol use: Yes     Alcohol/week: 3.0 standard drinks     Types: 1 Glasses of wine, 1 Cans of beer, 1 Shots of liquor per week     Comment: 1-2 drinks/week   • Drug use: Not Currently     Types: Marijuana     Comment: hx marijuana use \"a long time ago\"   • Sexual activity: Defer     E-cigarette/Vaping   • E-cigarette/Vaping Use Never User      E-cigarette/Vaping Substances     E-cigarette/Vaping Devices       ,   Medications Prior to Admission   Medication Sig Dispense Refill Last Dose   • Eliquis 5 MG tablet tablet TAKE ONE TABLET BY MOUTH EVERY 12 HOURS 60 tablet 1 2022 at Unknown time   • furosemide (LASIX) 20 MG tablet TAKE ONE TABLET BY MOUTH DAILY 30 tablet 5 2022 at Unknown time   • Gemtesa 75 MG tablet Daily.   2022 at Unknown time   • metoprolol tartrate (LOPRESSOR) 25 MG tablet TAKE ONE TABLET BY MOUTH TWICE A  tablet 3 2022 at Unknown time   • pantoprazole (PROTONIX) 40 MG EC tablet TAKE ONE TABLET BY MOUTH TWICE A DAY BEFORE A MEAL 180 tablet 3 2022 at Unknown time   • PARoxetine (PAXIL) 40 MG tablet TAKE ONE TABLET BY MOUTH EVERY MORNING 30 tablet 5 2022 at Unknown time   • potassium chloride (MICRO-K) 10 MEQ CR capsule TAKE ONE CAPSULE BY MOUTH DAILY 30 capsule 5 2022 at Unknown time   • pramipexole (MIRAPEX) 1.5 MG tablet TAKE ONE TABLET BY MOUTH TWICE A DAY (Patient taking differently: 3 mg Every Night. 2 tabs of 1.5 mg) 180 tablet 1 2022 at Unknown time   • albuterol sulfate  (90 Base) MCG/ACT inhaler Inhale 1 puff Every 4 (Four) Hours As Needed for Wheezing.   More than a month at Unknown time   • Cyanocobalamin 1000 MCG/ML kit Inject 1 mL as directed Every 30 (Thirty) Days. " "Intramuscularly. 1 kit 11    • ketoconazole (NIZORAL) 2 % cream       • Syringe 25G X 1\" 3 ML misc Use along with B12 . 1 ml into the appropriate side of the muscle once every 30 days. 50 each 1     and Allergies:  Patient has no known allergies.    Objective      Vital Signs  Temp:  [97.3 °F (36.3 °C)-98.4 °F (36.9 °C)] 97.3 °F (36.3 °C)  Heart Rate:  [54-64] 61  Resp:  [16] 16  BP: (136-148)/(71-82) 141/71    Physical Exam    Results Review:    I reviewed the patient's new clinical results.  I reviewed the patient's new imaging results and agree with the interpretation.  I reviewed the patient's other test results and agree with the interpretation    Lab Results:                WBC   Date Value Ref Range Status   05/15/2022 4.78 3.40 - 10.80 10*3/mm3 Final     Hemoglobin   Date Value Ref Range Status   05/15/2022 11.6 (L) 13.0 - 17.7 g/dL Final     Hematocrit   Date Value Ref Range Status   05/15/2022 35.0 (L) 37.5 - 51.0 % Final     Platelets   Date Value Ref Range Status   05/15/2022 142 140 - 450 10*3/mm3 Final     Sodium   Date Value Ref Range Status   05/15/2022 137 136 - 145 mmol/L Final     Potassium   Date Value Ref Range Status   05/15/2022 4.2 3.5 - 5.2 mmol/L Final     Chloride   Date Value Ref Range Status   05/15/2022 106 98 - 107 mmol/L Final     CO2   Date Value Ref Range Status   05/15/2022 23.0 22.0 - 29.0 mmol/L Final     BUN   Date Value Ref Range Status   05/15/2022 19 8 - 23 mg/dL Final     Creatinine   Date Value Ref Range Status   05/15/2022 1.32 (H) 0.76 - 1.27 mg/dL Final     Glucose   Date Value Ref Range Status   05/15/2022 111 (H) 65 - 99 mg/dL Final         Assessment & Plan       NSTEMI (non-ST elevated myocardial infarction) (HCC)    Adenocarcinoma of esophagus (HCC)    Hypertension    COPD (chronic obstructive pulmonary disease) (HCC)    Chronic anticoagulation    CKD (chronic kidney disease) stage 2, GFR 60-89 ml/min    Bronchitis      Assessment & Plan  Mr. Hurtado is a very " pleasant 72-year-old gentleman status post [place] Joe esophagectomy for an esophageal adenocarcinoma in 2015.  His last EGD did not demonstrate any abnormality and he does not have any symptoms of dysphagia that would necessitate an EGD at this time.  I suspect that his symptoms of chest pain and cough are related to his eating habits.  I have recommended that he not eat 3 hours prior to laying down and that when he lays down he will need a wedge pillow for at least 30 degrees of elevation.  Reviewing his CAT scan, there is some groundglass opacities that are consistent with aspiration and I suspect that he is having subclinical aspiration secondary to his esophagectomy and need for gravity to drain his stomach.  He has had no further chest pain since his presentation to the emergency department and his cardiac catheterization was negative.  From my perspective, he can be discharged home.    He will plan to follow-up with my partner, Dr. Christine, as an outpatient at his normally scheduled visit.  I discussed the patients findings and my recommendations with patient and nursing staff    Thank you for this consult and allowing us to participate in the care of your patient.  We will follow along with you during this hospitalization.       Katarina Capone MD  [facility]  05/15/22  17:04 EDT

## 2022-05-15 NOTE — THERAPY TREATMENT NOTE
Acute Care - Physical Therapy Treatment Note  [facility]     Patient Name: Jose Hurtado  : 1950  MRN: [id number]  Today's Date: 5/15/2022      Visit Dx:     ICD-10-CM ICD-9-CM   1. NSTEMI (non-ST elevated myocardial infarction) ([facility])  I21.4 410.70   2. Chronic obstructive pulmonary disease, unspecified COPD type ([facility])  J44.9 496   3. History of esophageal cancer  Z85.01 V10.03   4. Decreased mobility and endurance  Z74.09 780.99     Patient Active Problem List   Diagnosis   • Adenocarcinoma of esophagus (HCC)   • Adenomatous polyp of colon   • Malignant neoplasm of prostate (HCC)   • RLS (restless legs syndrome)   • History of DVT (deep vein thrombosis)   • Recurrent major depressive disorder, in partial remission ([facility])   • Hypertension   • Anemia   • Insomnia due to other mental disorder   • Peripheral polyneuropathy   • B12 deficiency   • Postsurgical malabsorption   • Lymphedema   • Iron deficiency   • Gastroparesis   • Acute deep vein thrombosis (DVT) of popliteal vein of left lower extremity ([facility])   • Tremor of both hands   • Gastrointestinal hemorrhage   • Acute blood loss anemia   • Pancytopenia ([facility])   • Chronic venous hypertension due to DVT   • COPD (chronic obstructive pulmonary disease) ([facility])   • Bleeding hemorrhoid   • Malabsorption of iron   • Iron deficiency anemia   • Pleuritic chest pain   • History of esophageal cancer   • Abnormal CT scan   • Generalized weakness   • Chronic anticoagulation   • Urinary tract infection due to extended-spectrum beta lactamase (ESBL) producing Escherichia coli   • CKD (chronic kidney disease) stage 2, GFR 60-89 ml/min   • Dysphagia   • PVC's (premature ventricular contractions)   • NSTEMI (non-ST elevated myocardial infarction) ([facility])   • Bronchitis     Past Medical History:   Diagnosis Date   • Acute deep vein thrombosis (DVT) of popliteal vein of left lower extremity ([facility]) 2020   • Anemia    • Anti-phospholipid syndrome ([facility])     On lifelong  anticoagulation therapy   • Bladder disorder     LEAKAGE   ON MED  wers pads   • Bleeding hemorrhoids    • Community acquired pneumonia     HISTORY OF IN 2014   • Deep venous thrombosis (HCC) 2006, 2008    Left lower extremity multiple   • Depression    • Esophageal carcinoma (HCC) 12/31/2014    had chemo and radiation prior surgery   • Hemorrhoids    • HH (hiatus hernia)    • History of atrial fibrillation 2015    ONE EPISODE WHILE HOSPITALIZED   • History of kidney stones    • History of nephrolithiasis    • History of pancreatitis     PT STATES MANY YEARS AGO   • History of radiation therapy    • History of transfusion    • Hypertension    • Long-term (current) use of anticoagulants, INR goal 2.0-3.0    • Lymphedema    • Malignant neoplasm of prostate (HCC)    • Other hyperlipidemia 1/30/2018 January 30, 2018 lipid panel risk 12.8%   • Restless legs syndrome    • Sleep apnea     OCCASIONALLY WEARS CPAP   • Squamous carcinoma     on the head     Past Surgical History:   Procedure Laterality Date   • APPENDECTOMY  1950   • BRONCHOSCOPY      (Diagnostic)   • CATARACT EXTRACTION Bilateral 2014   • COLONOSCOPY  12/15/2014    Complete / Description: EH, IH, torts, stool, follow-up colonoscopy due in 5 years.   • COLONOSCOPY N/A 6/13/2017    non-thrombosed external hemorrhoids, normal examined ileum, IH   • COLONOSCOPY N/A 2/26/2019    Procedure: COLONOSCOPY with Cold Polypectomy;  Surgeon: Mulugeta Millan MD;  Location: [facility] ENDOSCOPY;  Service: Gastroenterology   • COLONOSCOPY N/A 12/16/2020    Procedure: COLONOSCOPY to cecum into TI;  Surgeon: Mesha Sanchez MD;  Location: [facility] ENDOSCOPY;  Service: Gastroenterology;  Laterality: N/A;  pre: lower GI bleed  post: hemorrhoids    • CYSTOSCOPY W/ LASER LITHOTRIPSY     • ENDOSCOPY N/A 6/13/2017    Procedure: ESOPHAGOGASTRODUODENOSCOPY WITH COLD BIOPSY;  Surgeon: Lake Gonzalez MD;  Location: [facility] ENDOSCOPY;  Service:    • ENDOSCOPY N/A 11/18/2021     Procedure: ESOPHAGOGASTRODUODENOSCOPY WITH  DILATATION WITH FLUOROSCOPY;  Surgeon: Jose Christine III, MD;  Location: [facility] ENDOSCOPY;  Service: Gastroenterology;  Laterality: N/A;  Pre: dysphagia, h/x of adinocarcinoma of esophagus  Post: same   • ESOPHAGECTOMY      April 2015, stage IIB esophageal carcinoma, sub-total resection.   • ESOPHAGECTOMY      Esophagectomy Subtotal [place] Joe Procedure   • EXCISION LESION  08/2012    Removal of Squamous Cell CA on Head   • HAMMER TOE REPAIR  09/2014    Hammertoe Operation (Each Toe), 10/2014   • HAMMER TOE REPAIR Left 10/3/2017    Procedure: Left second third and fourth distal interphalangeal joint resection with flexor tenotomy;  Surgeon: Mulugeta Lira MD;  Location: [facility] OR [facility];  Service:    • HEMORRHOIDECTOMY N/A 12/23/2020    Procedure: HEMORRHOIDECTOMY;  Surgeon: Billy Julio Jr., MD;  Location: [facility] OR;  Service: General;  Laterality: N/A;   • HERNIA REPAIR      incisional   • JEJUNOSTOMY      Laparoscopic   • JEJUNOSTOMY      tube removal    • KNEE SURGERY Bilateral 1967, 1973, 1981   • PATELLA SURGERY Left     removed   • PILONIDAL CYST / SINUS EXCISION     • [place] TOTAL KNEE ARTHROPLASTY Right 3/26/2018    Procedure: TOTAL KNEE ARTHROPLASTY;  Surgeon: Renny Solis MD;  Location: [facility] OR;  Service: Orthopedics   • PROSTATECTOMY  2010   • PYLOROPLASTY     • SPINAL FUSION  02/1998    C 5,6   • TONSILLECTOMY     • UPPER GASTROINTESTINAL ENDOSCOPY  12/15/2014    LA Grade D esophagitis, Pardo's, HH, multiple duodenal ulcers   • VENTRAL/INCISIONAL HERNIA REPAIR N/A 4/14/2016    Procedure: VENTRAL/INCISIONAL HERNIA REPAIR, open, with mesh, and component separation;  Surgeon: Darren Rivas MD;  Location: [facility] OR;  Service:      PT Assessment (last 12 hours)     PT Evaluation and Treatment     Row Name 05/15/22 1023          Physical Therapy Time and Intention    Subjective Information no complaints  Patient was evaluated  by PT on 5/13.  -     Document Type therapy note (daily note)  -JR     Mode of Treatment individual therapy  -JR     Patient Effort good  -     Row Name 05/15/22 1023          General Information    Patient/Family/Caregiver Comments/Observations Wife is present and is very supportive.  -     Row Name 05/15/22 1023          Pain    Pretreatment Pain Rating 0/10 - no pain  -JR     Posttreatment Pain Rating 0/10 - no pain  -JR     Row Name 05/15/22 1023          Cognition    Orientation Status (Cognition) oriented x 4  -JR     Row Name 05/15/22 1023          Bed Mobility    Supine-Sit [place] (Bed Mobility) contact guard  -JR     Sit-Supine [place] (Bed Mobility) standby assist  -JR     Row Name 05/15/22 1023          Transfers    Sit-Stand [place] (Transfers) contact guard  -JR     Row Name 05/15/22 1023          Gait/Stairs (Locomotion)    [place] Level (Gait) contact guard  -JR     Assistive Device (Gait) walker, 4-wheeled  -JR     Distance in Feet (Gait) 200  -JR     Deviations/Abnormal Patterns (Gait) jf decreased;gait speed decreased;weight shifting decreased;stride length decreased  -JR     Left Sided Gait Deviations foot drop/toe drag;forward flexed posture  -JR     Row Name 05/15/22 1023          Safety Issues, Functional Mobility    Impairments Affecting Function (Mobility) balance;strength  -     Row Name 05/15/22 1023          Balance    Static Standing Balance contact guard  -JR     Dynamic Standing Balance contact guard  -JR     Balance Interventions standing;sit to stand;supported;static;dynamic;other (see comments)  -     Comment, Balance Patient performed hip flexion, hip extension, and hip abduction x 10 reps each leg while holding onto rail in hallway.  He also performed side to side ambulation with CGA x 10 steps each direction and backward ambulation x 10 steps each leg.  -     Row Name 05/15/22 1023          Plan of Care Review    Plan of Care Reviewed With  patient  -JR     Progress improving  -JR     Row Name 05/15/22 1023          Positioning and Restraints    Pre-Treatment Position in bed  -JR     Post Treatment Position bed  -JR     In Bed supine;notified nsg;call light within reach;encouraged to call for assist  -JR           User Key  (r) = Recorded By, (t) = Taken By, (c) = Cosigned By    Initials Name Provider Type    Darian Jo PT Physical Therapist                Physical Therapy Education                 Title: PT OT SLP Therapies (Done)     Topic: Physical Therapy (Done)     Point: Mobility training (Done)     Learning Progress Summary           Patient Eager, E,D, VU,DU by  at 5/15/2022 1028    Acceptance, E,TB, VU by  at 5/13/2022 1609    Acceptance, E,TB,D, VU,NR by  at 5/13/2022 1100   Family Eager, E,D, VU,DU by  at 5/15/2022 1028                   Point: Home exercise program (Done)     Learning Progress Summary           Patient Eager, E,D, VU,DU by  at 5/15/2022 1028    Acceptance, E,TB, VU by  at 5/13/2022 1609   Family Eager, E,D, VU,DU by  at 5/15/2022 1028                   Point: Body mechanics (Done)     Learning Progress Summary           Patient Eager, E,D, VU,DU by  at 5/15/2022 1028    Acceptance, E,TB, VU by  at 5/13/2022 1609    Acceptance, E,TB,D, VU,NR by  at 5/13/2022 1100   Family Eager, E,D, VU,DU by  at 5/15/2022 1028                   Point: Precautions (Done)     Learning Progress Summary           Patient Eager, E,D, VU,DU by  at 5/15/2022 1028    Acceptance, E,TB, VU by  at 5/13/2022 1609    Acceptance, E,TB,D, VU,NR by  at 5/13/2022 1100   Family Eager, E,D, VU,DU by  at 5/15/2022 1028                               User Key     Initials Effective Dates Name Provider Type Discipline     06/16/21 -  Stefany Fernandez PT Physical Therapist PT     04/09/21 -  Escobar Solis, RN Registered Nurse Nurse     06/16/21 -  Darian Kam PT Physical Therapist PT              PT  Recommendation and Plan     Plan of Care Reviewed With: patient, spouse  Progress: improving  Outcome Evaluation: Patient's activity is improving and he is performing some higher level, standing balance exercises.  He is motivated to work with PT.  Nearing completion of PT goals.   Outcome Measures     Row Name 05/15/22 1029             How much help from another person do you currently need...    Turning from your back to your side while in flat bed without using bedrails? 4  -JR      Moving from lying on back to sitting on the side of a flat bed without bedrails? 4  -JR      Moving to and from a bed to a chair (including a wheelchair)? 3  -JR      Standing up from a chair using your arms (e.g., wheelchair, bedside chair)? 3  -JR      Climbing 3-5 steps with a railing? 3  -JR      To walk in hospital room? 3  -JR      AM-PAC 6 Clicks Score (PT) 20  -JR            User Key  (r) = Recorded By, (t) = Taken By, (c) = Cosigned By    Initials Name Provider Type    Darian Jo, MANDA Physical Therapist                 Time Calculation:    PT Charges     Row Name 05/15/22 1029             Time Calculation    Start Time 1000  -JR      Stop Time 1022  -JR      Time Calculation (min) 22 min  -JR      PT Received On 05/15/22  -JR      PT - Next Appointment 05/16/22  -            User Key  (r) = Recorded By, (t) = Taken By, (c) = Cosigned By    Initials Name Provider Type    Darian Jo, MANDA Physical Therapist              Therapy Charges for Today     Code Description Service Date Service Provider Modifiers Qty    76943360507  PT THERAPEUTIC ACT EA 15 MIN 5/15/2022 Darian Kam, PT GP 1    17884315122 HC GAIT TRAINING EA 15 MIN 5/15/2022 Darian Kam, PT GP 1          PT G-Codes  Outcome Measure Options: AM-PAC 6 Clicks Basic Mobility (PT)  AM-PAC 6 Clicks Score (PT): 20    Darian Kam PT  5/15/2022

## 2022-05-15 NOTE — OUTREACH NOTE
Prep Survey    Flowsheet Row Responses   [facility] patient discharged from? [place]   Is LACE score < 7 ? No   Emergency Room discharge w/ pulse ox? No   Eligibility [facility]   Date of Admission 05/12/22   Date of Discharge 05/15/22   Discharge Disposition Home or Self Care   Discharge diagnosis NSTEMI    Does the patient have one of the following disease processes/diagnoses(primary or secondary)? Acute MI (STEMI,NSTEMI)   Does the patient have Home health ordered? No   Is there a DME ordered? No   Prep survey completed? Yes          WILL HAN - Registered Nurse

## 2022-05-15 NOTE — PLAN OF CARE
Goal Outcome Evaluation:  Plan of Care Reviewed With: patient        Progress: improving  Outcome Evaluation: outcome goal met

## 2022-05-15 NOTE — PROGRESS NOTES
Hospital Follow Up    LOS:  LOS: 0 days   Patient Name: Jose Hurtado  Age/Sex: 72 y.o. male  : 1950  MRN: [id number]    Day of Service: 05/15/22   Length of Stay: 0  Encounter Provider: FAYE Longo  Place of Service: [facility] CARDIOLOGY  Patient Care Team:  Brandin Bolton MD as PCP - General (Internal Medicine)  Code, George THORPE II, MD as Consulting Physician (Hematology and Oncology)  Jose Inman MD as Consulting Physician (Urology)  Mulugeta Millan MD as Consulting Physician (Gastroenterology)  Jose Christine III, MD as Referring Physician (Thoracic Surgery)  Seth Randhawa MD as Consulting Physician (Ophthalmology)  James Cyr MD as Consulting Physician (Cardiology)    Subjective:     Chief Complaint: chest pain, elevated troponin    Interval History: NO complaints today    Objective:     Objective:  Temp:  [97.8 °F (36.6 °C)-98.4 °F (36.9 °C)] 98.4 °F (36.9 °C)  Heart Rate:  [54-64] 64  Resp:  [13-18] 16  BP: (127-148)/(62-82) 148/82     Intake/Output Summary (Last 24 hours) at 5/15/2022 0930  Last data filed at 5/15/2022 0835  Gross per 24 hour   Intake 875 ml   Output --   Net 875 ml     Body mass index is 24.36 kg/m².      22  0605 22  1147 22  0435   Weight: 96 kg (211 lb 9.6 oz) 95.3 kg (210 lb) 93.2 kg (205 lb 6.4 oz)     Weight change:     Physical Exam:   General Appearance:    Awake alert and oriented in no acute distress.   Color:  Skin:  Neuro:  HEENT:    Lungs:     Pink  Warm and dry  No focal, motor or sensory deficits  Neck supple, pupils equal, round and reactive. No JVD, No Bruit  Clear to auscultation,respirations regular, even and                  unlabored    Heart:    Regular rate and rhythm, S1 and S2, no murmur, no gallop, no rub. No edema, DP/PT pulses are 2+   Chest Wall:    No abnormalities observed   Abdomen:     Normal bowel sounds, no masses, no organomegaly, soft        non-tender,  non-distended, no guarding, no ascites noted   Extremities:   Moves all extremities well, no edema, no cyanosis, no redness. right radial cath site stable       Lab Review:   Results from last 7 days   Lab Units 05/15/22  0323 05/14/22  0603 05/13/22  0612 05/12/22  1457   SODIUM mmol/L 137 138   < > 140   POTASSIUM mmol/L 4.2 4.3   < > 4.1   CHLORIDE mmol/L 106 106   < > 106   CO2 mmol/L 23.0 23.0   < > 24.0   BUN mg/dL 19 18   < > 19   CREATININE mg/dL 1.32* 1.18   < > 1.28*   GLUCOSE mg/dL 111* 108*   < > 112*   CALCIUM mg/dL 8.5* 8.5*   < > 8.9   AST (SGOT) U/L  --   --   --  25   ALT (SGPT) U/L  --   --   --  19    < > = values in this interval not displayed.     Results from last 7 days   Lab Units 05/14/22  1822 05/14/22  1325 05/14/22  0603 05/13/22  2346 05/13/22  1752 05/13/22  1318 05/13/22  0612 05/12/22  2357 05/12/22  1716 05/12/22  1457   TROPONIN T ng/mL 0.070* 0.075* 0.084* 0.096* 0.102* 0.104* 0.105* 0.103* 0.094* 0.085*     Results from last 7 days   Lab Units 05/15/22  0323 05/14/22  0603   WBC 10*3/mm3 4.78 4.79   HEMOGLOBIN g/dL 11.6* 11.4*   HEMATOCRIT % 35.0* 33.5*   PLATELETS 10*3/mm3 142 127*                   Invalid input(s): LDLCALC  Results from last 7 days   Lab Units 05/12/22  1457   PROBNP pg/mL 420.0         I reviewed the patient's new clinical results.  I personally viewed and interpreted the patient's EKG  Current Medications:   Scheduled Meds:aspirin, 81 mg, Oral, Daily  atorvastatin, 40 mg, Oral, Nightly  furosemide, 20 mg, Oral, Daily  metoprolol tartrate, 25 mg, Oral, BID  Mirabegron ER, 50 mg, Oral, Daily  pantoprazole, 40 mg, Oral, QAM AC  PARoxetine, 40 mg, Oral, QAM  potassium chloride, 10 mEq, Oral, Daily  pramipexole, 3 mg, Oral, Nightly      Continuous Infusions:     Allergies:  No Known Allergies    Assessment:       NSTEMI (non-ST elevated myocardial infarction) (HCC)    Adenocarcinoma of esophagus (HCC)    Hypertension    COPD (chronic obstructive pulmonary disease)  (HCC)    Chronic anticoagulation    CKD (chronic kidney disease) stage 2, GFR 60-89 ml/min    Bronchitis    1.  Non-ST elevation MI- Normal coronaries  2.  Chest pain  3.  Chronic DVT/PE: On Eliquis at home  4.  Essential hypertension  5.  History of esophageal cancer  6.  Retching/nausea vomiting    Plan:       NO significant CAD yesterday. EF stable. Resume Eliquis tomorrow. Ok for dc from our standpoint.    FAYE Longo  05/15/22  09:30 EDT  Electronically signed by FAYE Longo, 05/15/22, 9:30 AM EDT.

## 2022-05-15 NOTE — PROGRESS NOTES
Name: Jose Hurtado ADMIT: 2022   : 1950  PCP: Brandin Bolton MD    MRN: [id number] LOS: 0 days   AGE/SEX: 72 y.o. male  ROOM: /     Subjective   Subjective   Was off floor earlier today for cardiac cath.   Doing well post cath.  Right radial cath site with pressure dressing, no pain. Denies nausea, vomiting, or abd pain.  No further chest pain.  Has coughed a few times today but not associated with meals per se he states.  Denies fever or chills. Appetite good.     Review of Systems   Constitutional: Negative for chills and fever.   HENT: Negative for sore throat and trouble swallowing.    Respiratory: Positive for cough. Negative for choking, chest tightness, shortness of breath and wheezing.    Cardiovascular: Positive for leg swelling (feels is at baseline. ). Negative for chest pain.   Gastrointestinal: Negative for abdominal distention, abdominal pain, nausea and vomiting.   Genitourinary: Negative for difficulty urinating and dysuria.          Objective   Objective   Vital Signs  Temp:  [97.3 °F (36.3 °C)-98.8 °F (37.1 °C)] 97.9 °F (36.6 °C)  Heart Rate:  [57-65] 57  Resp:  [13-18] 15  BP: (119-146)/(62-83) 127/62  SpO2:  [91 %-95 %] 95 %  on  Flow (L/min):  [2] 2;   Device (Oxygen Therapy): room air  Body mass index is 24.36 kg/m².     Physical Exam  Constitutional:       General: He is not in acute distress.     Appearance: He is not ill-appearing.      Comments: thin   Eyes:      General: No scleral icterus.     Conjunctiva/sclera: Conjunctivae normal.   Cardiovascular:      Rate and Rhythm: Normal rate and regular rhythm.      Pulses: Normal pulses.      Heart sounds: Normal heart sounds. No murmur heard.  Pulmonary:      Effort: Pulmonary effort is normal. No respiratory distress.      Breath sounds: Rales (fine crackles to RLL. ) present. No wheezing or rhonchi.   Abdominal:      General: Bowel sounds are normal. There is no distension.      Palpations: Abdomen is soft.       Tenderness: There is no abdominal tenderness. There is no guarding.   Musculoskeletal:      Right lower leg: Edema present.      Left lower leg: Edema present.      Comments: Trace bilateral edema Lower extremities.    Skin:     General: Skin is warm and dry.      Comments: Right radial cath site with opsite pressure dressing d/i.  Right radial and ulnar pulses 2+,  BRISK cap refill.    Neurological:      General: No focal deficit present.      Mental Status: He is alert and oriented to person, place, and time.   Psychiatric:         Mood and Affect: Mood normal.         Behavior: Behavior normal.         Thought Content: Thought content normal.         Judgment: Judgment normal.         Results Review     I reviewed the patient's new clinical results.  Results from last 7 days   Lab Units 05/14/22  0603 05/13/22  0612 05/12/22  1457   WBC 10*3/mm3 4.79 5.67 5.01   HEMOGLOBIN g/dL 11.4* 11.0* 12.8*   PLATELETS 10*3/mm3 127* 137* 157     Results from last 7 days   Lab Units 05/14/22  0603 05/13/22  0612 05/12/22  1457   SODIUM mmol/L 138 139 140   POTASSIUM mmol/L 4.3 4.2 4.1   CHLORIDE mmol/L 106 108* 106   CO2 mmol/L 23.0 23.7 24.0   BUN mg/dL 18 18 19   CREATININE mg/dL 1.18 1.15 1.28*   GLUCOSE mg/dL 108* 99 112*   Estimated Creatinine Clearance: 72.4 mL/min (by C-G formula based on SCr of 1.18 mg/dL).  Results from last 7 days   Lab Units 05/12/22  1457   ALBUMIN g/dL 3.90   BILIRUBIN mg/dL 0.5   ALK PHOS U/L 113   AST (SGOT) U/L 25   ALT (SGPT) U/L 19     Results from last 7 days   Lab Units 05/14/22  0603 05/13/22  0612 05/12/22  1457   CALCIUM mg/dL 8.5* 8.5* 8.9   ALBUMIN g/dL  --   --  3.90     Results from last 7 days   Lab Units 05/13/22  0612 05/12/22  1716   PROCALCITONIN ng/mL 0.39* 0.30*     COVID19   Date Value Ref Range Status   05/12/2022 Not Detected Not Detected - Ref. Range Final   11/16/2021 Not Detected Not Detected - Ref. Range Final     No results found for: HGBA1C, POCGLU    Cardiac  Catheterization/Vascular Study  Interventionalist: Eric So M.D., .A.C.C.    Procedure performed:  1.  Coronary angiography  2.  Left heart catheterization  3.  Left ventricular angiography    Indication:  NSTEMI    Referring: Judah    Procedure report:  Informed consent was obtained and the patient was brought to the cardiac   catheterization lab for coronary angiography, left heart catheterization,   and left ventricular angiography.  Moderate sedation was given with Versed   and fentanyl. The right wrist was prepped and draped in a sterile fashion.    Lidocaine was infused in the subcutaneous tissue for anesthesia.  Access   was obtained to the right radial artery with a 20-gauge radial artery   needle.  A 5 [place] Islander 11 cm sheath was then advanced into the right radial   artery without difficulty.  A 5 [place]er JL 3.5 catheter was used for left   coronary angiography.  A 5 [place]er JR5 catheter was used for right coronary   angiography.  A 5 [place]er angled pigtail catheter was used to cross the   aortic valve, measuring left ventricular pressures, perform left   ventricular angiography, and perform pullback across the aortic valve   measurements of the pressure gradient.  10 mL/s for a total 20 mL of   Isovue 370 was used to perform left ventricular angiography in a RUIZ view.    At case completion the pigtail catheter was removed over a 0.035 inch   J-wire without difficulty.  The right radial artery sheath was removed   without difficulty and Tensaplast was placed over the access site with   excellent hemostasis.     Procedure findings:  Left main: Large-caliber vessel that bifurcates to an LAD and circumflex.    Normal.  LAD: Medium caliber vessel that gives rise to a small caliber diagonal   branch in the mid/distal segment.  LAD disease is normal.  LCX: Medium caliber vessel gives rise to a small caliber OM1 and medium   caliber OM 2 and OM 3 branch.  Normal.  RCA: Large-caliber vessel that gives rise  to a small caliber PDA and 2 RPL   branches.  Normal.    Left ventricular angiography: Normal left ventricular size and systolic   function.  Ejection fraction 55 to 60%.  Normal wall motion    Hemodynamics:   LV:105/4  AO: 105/52    Estimated blood loss: Minimal    Complications: None    Conclusions:   1.  Normal coronary angiography  2.  Normal left ventricular size and systolic function.    I reviewed the patient's daily medications.  Scheduled Medications  aspirin, 81 mg, Oral, Daily  atorvastatin, 40 mg, Oral, Nightly  furosemide, 20 mg, Oral, Daily  metoprolol tartrate, 25 mg, Oral, BID  Mirabegron ER, 50 mg, Oral, Daily  pantoprazole, 40 mg, Oral, QAM AC  PARoxetine, 40 mg, Oral, QAM  potassium chloride, 10 mEq, Oral, Daily  pramipexole, 3 mg, Oral, Nightly    Infusions   Diet  Diet Regular; Consistent Carbohydrate, Cardiac       Assessment/Plan     Active Hospital Problems    Diagnosis  POA   • **NSTEMI (non-ST elevated myocardial infarction) ([facility]) [I21.4]  Yes   • Bronchitis [J40]  Yes   • CKD (chronic kidney disease) stage 2, GFR 60-89 ml/min [N18.2]  Yes   • Chronic anticoagulation [Z79.01]  Not Applicable   • COPD (chronic obstructive pulmonary disease) ([facility]) [J44.9]  Yes   • Hypertension [I10]  Yes   • Adenocarcinoma of esophagus ([facility]) [C15.9]  Yes      Resolved Hospital Problems   No resolved problems to display.       72 y.o. male with history of esophageal cancer s/p esophagectomy with gastric pull through in 2015 and chemo/radiation in remission, COPD, history of DVT/PE on eliquis who presented with cough, retching and chest tightness.  He was found to have elevated troponin.    NSTEMI  -Chest pain is atypical but due to his risk factors (hypertension, hyperlipidemia, history of tobacco use), cardiology recommends catheterization.   He underwent cardiac cath today which showed normal coronary angiography, normal left ventricular size, and normal systolic function. Radial Cath site with dressing d/i.    Hands warm with 2+ radial and ulnar pulses.   - echo 5/13 showed LVH, EF 56% with normal diastolic function.  RVSP mildly elevtated at 43 mmHg.    - Vitals stable post cath.  Tele SR.    - bmp in am post cath.   - continue statin, asa, lopressor.      Bronchitis  -Pro-Gonzalez slightly elevated at 0.3.  Strep pneumo, Legionella urine antigens negative.  Minimal sputum production for culture.  Remains Afebrile and no leukocytosis.    -antibiotics stopped 5/12 last dose.  Encourage incentive spirometry. Not currently at bedside.  Will reorder.  Recheck CBC in am.      History of esophageal cancer status post esophagectomy with gastric pull-through  -No signs or symptoms of aspiration per SLP.  Continue PPI  -GI has seen and deferring EGD to CTS Dr. Christine who he has seen for  His esophageal adenocarcinoma and had outpt EGD 11/2021.   - Will discuss referral to CTS with Dr. Julio.      Chronic DVT/PE  -Dr. So is recommending Holding home Eliquis for 48 hours post cardiac cath.  Cath completed 5/14.   No PE on CTA chest.    Increase mobility.  Up to chair for meals.  Will order PT/OT.      · SCDs for DVT prophylaxis.  · Full code.  · Discussed with pt and bedside RN. Wife not at bedside will discuss with her at bedside in am.   · Anticipate discharge home timing yet to be determined.      FAYE White  [facility]  05/14/22  21:04 EDT    Patient was placed in face mask on first look.  I wore protective equipment throughout this patient encounter including a face mask, gloves and protective eyewear.  Hand hygiene was performed before donning protective equipment and after removal when leaving the room.

## 2022-05-16 ENCOUNTER — OFFICE VISIT (OUTPATIENT)
Dept: INTERNAL MEDICINE | Age: 72
End: 2022-05-16

## 2022-05-16 ENCOUNTER — TRANSITIONAL CARE MANAGEMENT TELEPHONE ENCOUNTER (OUTPATIENT)
Dept: CALL CENTER | Facility: HOSPITAL | Age: 72
End: 2022-05-16

## 2022-05-16 ENCOUNTER — TELEPHONE (OUTPATIENT)
Dept: SURGERY | Facility: CLINIC | Age: 72
End: 2022-05-16

## 2022-05-16 VITALS
DIASTOLIC BLOOD PRESSURE: 70 MMHG | TEMPERATURE: 98 F | SYSTOLIC BLOOD PRESSURE: 132 MMHG | OXYGEN SATURATION: 98 % | BODY MASS INDEX: 24.09 KG/M2 | HEIGHT: 77 IN | WEIGHT: 204 LBS | HEART RATE: 68 BPM

## 2022-05-16 DIAGNOSIS — I21.4 NSTEMI (NON-ST ELEVATED MYOCARDIAL INFARCTION): Primary | ICD-10-CM

## 2022-05-16 DIAGNOSIS — I10 PRIMARY HYPERTENSION: Chronic | ICD-10-CM

## 2022-05-16 DIAGNOSIS — Z85.01 HISTORY OF ESOPHAGEAL CANCER: Chronic | ICD-10-CM

## 2022-05-16 DIAGNOSIS — R79.89 INCREASE IN CREATININE: ICD-10-CM

## 2022-05-16 PROCEDURE — 1111F DSCHRG MED/CURRENT MED MERGE: CPT | Performed by: INTERNAL MEDICINE

## 2022-05-16 PROCEDURE — 99495 TRANSJ CARE MGMT MOD F2F 14D: CPT | Performed by: INTERNAL MEDICINE

## 2022-05-16 NOTE — OUTREACH NOTE
Call Center TCM Note    Flowsheet Row Responses   [facility] patient discharged from? [place]   Does the patient have one of the following disease processes/diagnoses(primary or secondary)? Acute MI (STEMI,NSTEMI)   TCM attempt successful? No   Revoked Reason Other  [Pt had TCM hosp dc fu apt this morning with PCP 5-16-22.  This satisfies TCM requirements. ]          Yary Broderick RN    5/16/2022, 11:49 EDT

## 2022-05-16 NOTE — TELEPHONE ENCOUNTER
Caller:  JJ BOURGEOIS    Relationship to patient:  SELF    Best call back number: [phone]    New or established patient?  [] New  [x] Established    Date of discharge: 5.15.22    Facility discharged from:     Diagnosis/Symptoms: HIGH LEVELS OF TROPONIN      Specialty Only: Did you see a [facility] provider?    [x] Yes  [] No  If so, who? ALEXA MAYA      COMMENTS: PATIENT ADVISED HE SAW DR MAYA IN THE HOSPITAL. HE THEN ADVISED HE HAD AN APPT WITH DR. GASCA THIS FALL. HOWEVER, I DO NOT SEE AN APPT. PLEASE CALL PATIENT ASAP AND ADVISE IF APPT SHOULD BE MADE AND PATIENT WANTS TO KNOW IF THEY SHOULD SEE DR. GASCA OR DR. MAYA. PLEASE CALL BACK

## 2022-05-16 NOTE — PROGRESS NOTES
Brief Note:     Noted SLP dietary recommendations was not forwarded on pt's AVS discharge instructions.  I called and spoke with Pt via phone and re enforced the below SLP recommendations:   SLP Swallowing Diagnosis: functional oral phase, functional pharyngeal phase (05/13/22 1400)  SLP Diet Recommendation: soft textures, chopped, thin liquids (05/13/22 1400)  Recommended Precautions and Strategies: upright posture during/after eating, small bites of food and sips of liquid, other (see comments) (more frequent smaller meals).     Patient verbalized understanding.  He states he is feeling good.      I also left voicemail message regarding SLP recommendations on his wife Lena's mobile phone listed in demographics.    FAYE Wright  [facility]

## 2022-05-16 NOTE — PROGRESS NOTES
"    I N T E R N A L  M E D I C I N E  J U N O H  K I M,  M D      ENCOUNTER DATE:  05/16/2022    Jose Hurtado / 72 y.o. / male        CC:   (Transitional Care Follow Up Visit)  TCM-NSTEMI (non-ST elevated myocardial infarction)  (/5/12/2022 - 5/15/2022 /)        Within 48 business hours after discharge our office contacted him via telephone to coordinate his care and needs.      I reviewed and discussed the details of that call along with the discharge summary, hospital problems, inpatient lab results, inpatient diagnostic studies, and consultation reports with the patient.     Date of TCM Phone Call 5/15/2022   [facility]   Date of Admission 5/12/2022   Date of Discharge 5/15/2022   Discharge Disposition Home or Self Care       Risk for Readmission (LACE) Score: 9 (5/15/2022  6:00 AM)            VITALS    Visit Vitals  /70 (BP Location: Left arm)   Pulse 68   Temp 98 °F (36.7 °C)   Ht 195.6 cm (77\")   Wt 92.5 kg (204 lb)   SpO2 98%   BMI 24.19 kg/m²       BP Readings from Last 3 Encounters:   05/16/22 132/70   05/15/22 141/71   03/30/22 132/62     Wt Readings from Last 3 Encounters:   05/16/22 92.5 kg (204 lb)   05/14/22 93.2 kg (205 lb 6.4 oz)   03/30/22 95.3 kg (210 lb)      Body mass index is 24.19 kg/m².    HPI:     Date of admission/discharge: As noted above in CC  Hospital: [facility], operated by [facility]   Principle Dx: Possible NSTEMI (elevated troponin)  Secondary Dx: Dry heaving, aspiration, history of esophaeal cancer, increased creatinine, hypertension   History prior to hospitalization: Episodes of dry heaving and shortness of breath; history of esophageal cancer   Evaluation/Treatment: troponin level was mildly elevated with pattern consistent with mild NSTEMI which was likely due to demand ischemia; hearth cath was however normal. CTA was negative for PE but showed some changes suggestive of aspiration. He was initially placed on antibiotic which was then discontinued. Cardiologist " evaluated including heart cath which was negative. Slight bump in creatinine post-cath and Lasix was held.  Course: Patient denies recurrent dry heaves, abdominal pain or nausea/vomiting.  He is able to eat normally.  He was instructed to avoid lying down within 3 hours after eating.  He denies any chest pain or worsening dyspnea on exertion.  Denies any fever, chills or worsening cough.    Patient Care Team:  Brandin Bolton MD as PCP - General (Internal Medicine)  Code, George THORPE II, MD as Consulting Physician (Hematology and Oncology)  Jose Inman MD as Consulting Physician (Urology)  Mulugeta Millan MD as Consulting Physician (Gastroenterology)  Jose Christine III, MD as Referring Physician (Thoracic Surgery)  Seth Randhawa MD as Consulting Physician (Ophthalmology)  James Cyr MD as Consulting Physician (Cardiology)  ____________________________________________________________________    ASSESSMENT & PLAN:    1. NSTEMI (non-ST elevated myocardial infarction) (HCC)    2. Increase in creatinine    3. History of esophageal cancer    4. Primary hypertension      Orders Placed This Encounter   Procedures   • Basic Metabolic Panel       Summary/Discussion:  • Had mild troponin elevation (likely related to demand ischemia). Heart cath was normal in the hospital. He has no chest pain symptoms.  He is currently taking atorvastatin and he was advised to continue until he discusses this with his cardiologist.  • Check BMP today.  If creatinine is back to baseline will resume Lasix.  • Blood pressure remained stable.  He was advised to resume metoprolol tartrate at 25 mg twice daily.  • He was instructed to resume Eliquis for history of DVT of the left lower extremity.    Return for Next scheduled follow up.    ____________________________________________________________________    REVIEW OF SYSTEMS    Review of Systems  No fever or chills  No chest pain or worsening cough/pradhan  GI negative for  dysphagia or abdominal pain     PHYSICAL EXAMINATION    Physical Exam  No acute distress   Alert with normal thought and judgment.   Cardiovascular: Normal rate, regular rhythm.  Pulm/Chest: Effort normal, breath sounds normal.   Abdomen: Soft and nontender.     REVIEWED DATA:    Labs:   Lab Results   Component Value Date     05/15/2022    K 4.2 05/15/2022    CALCIUM 8.5 (L) 05/15/2022    AST 25 05/12/2022    ALT 19 05/12/2022    BUN 19 05/15/2022    CREATININE 1.32 (H) 05/15/2022    CREATININE 1.18 05/14/2022    CREATININE 1.15 05/13/2022    EGFRIFNONA 62 06/04/2021    EGFRIFAFRI 75 06/04/2021       Lab Results   Component Value Date    WBC 4.78 05/15/2022    HGB 11.6 (L) 05/15/2022    HGB 11.4 (L) 05/14/2022    HGB 11.0 (L) 05/13/2022     05/15/2022       Lab Results   Component Value Date    GLUCOSEU Negative 05/11/2021    BLOODU Large (3+) (A) 05/11/2021    NITRITEU Negative 05/11/2021    LEUKOCYTESUR Large (3+) (A) 05/11/2021       Imaging:   Adult Transthoracic Echo Complete w/ Color, Spectral and Contrast if necessary per protocol    Result Date: 5/13/2022  Narrative: · Estimated right ventricular systolic pressure from tricuspid regurgitation is mildly elevated (35-45 mmHg). Calculated right ventricular systolic pressure from tricuspid regurgitation is 43 mmHg. · Left ventricular wall thickness is consistent with mild concentric hypertrophy. · Estimated left ventricular EF = 56% Left ventricular systolic function is normal. · Left ventricular diastolic function was normal.      XR Chest 2 View    Result Date: 5/12/2022  Narrative: CHEST: 2 VIEWS  HISTORY: Cough and shortness of air.  COMPARISON: CT chest 03/22/2022, 2 view chest 08/06/2021.  FINDINGS:Heart size is enlarged. There has been previous esophagogastrectomy with gastric pull-through extending along the medial right lung base with adjacent compressive right lower lobe atelectasis. There is a small right pleural effusion and there is  right pleural thickening, similar to the CT 03/22/2022. There are rib changes in the right related to previous thoracotomy. There is no evidence of pulmonary edema or pneumothorax. Left lung appears clear.      Impression: Postsurgical changes in the right where there has been thoracotomy, as well as esophagogastrectomy with gastric pull-through. Compressive atelectasis right lower lobe with right pleural effusion and pleural thickening. The findings are similar to previous chest CT 03/22/2022.  This report was finalized on 5/12/2022 4:32 PM by Dr. Andrade Boo M.D.      Cardiac Catheterization/Vascular Study    Result Date: 5/14/2022  Narrative: Interventionalist: Eric So M.D., F.A.C.C. Procedure performed: 1.  Coronary angiography 2.  Left heart catheterization 3.  Left ventricular angiography Indication: NSTEMI Referring: Judah Procedure report: Informed consent was obtained and the patient was brought to the cardiac catheterization lab for coronary angiography, left heart catheterization, and left ventricular angiography.  Moderate sedation was given with Versed and fentanyl. The right wrist was prepped and draped in a sterile fashion.  Lidocaine was infused in the subcutaneous tissue for anesthesia.  Access was obtained to the right radial artery with a 20-gauge radial artery needle.  A 5 Argentine 11 cm sheath was then advanced into the right radial artery without difficulty.  A 5 Argentine JL 3.5 catheter was used for left coronary angiography.  A 5 Argentine JR5 catheter was used for right coronary angiography.  A 5 Argentine angled pigtail catheter was used to cross the aortic valve, measuring left ventricular pressures, perform left ventricular angiography, and perform pullback across the aortic valve measurements of the pressure gradient.  10 mL/s for a total 20 mL of Isovue 370 was used to perform left ventricular angiography in a RUIZ view.  At case completion the pigtail catheter was removed over a  0.035 inch J-wire without difficulty.  The right radial artery sheath was removed without difficulty and Tensaplast was placed over the access site with excellent hemostasis. Procedure findings: Left main: Large-caliber vessel that bifurcates to an LAD and circumflex.  Normal. LAD: Medium caliber vessel that gives rise to a small caliber diagonal branch in the mid/distal segment.  LAD disease is normal. LCX: Medium caliber vessel gives rise to a small caliber OM1 and medium caliber OM 2 and OM 3 branch.  Normal. RCA: Large-caliber vessel that gives rise to a small caliber PDA and 2 RPL branches.  Normal. Left ventricular angiography: Normal left ventricular size and systolic function.  Ejection fraction 55 to 60%.  Normal wall motion Hemodynamics: LV:105/4 AO: 105/52 Estimated blood loss: Minimal Complications: None Conclusions: 1.  Normal coronary angiography 2.  Normal left ventricular size and systolic function.    XR Chest 1 View    Result Date: 5/12/2022  Narrative: CHEST SINGLE VIEW  HISTORY: Shortness of breath, abdominal pain  COMPARISON: Two-view chest 05/12/2022, CT chest 03/22/2022, 2 view chest 08/06/2021.  FINDINGS: Heart size is enlarged. There has been esophagectomy with gastric pull-through and the stomach extends medial to the right lower lobe with adjacent compressive right lower lobe atelectasis. There is a small right pleural effusion and there is right-sided pleural thickening. Rib changes are present on the right related to previous right thoracotomy. There is no evidence for change compared to the 2 view chest obtained just prior to this exam. Left lung appears clear.      Impression: Postsurgical changes on the right with where there has been thoracotomy with esophagogastrectomy and gastric pull-through. There is compressive atelectasis in the right lower lobe. Right pleural effusion is present and there is right-sided pleural thickening. No evidence for change compared to the PA and lateral  chest performed just previous to this exam.  This report was finalized on 5/12/2022 4:31 PM by Dr. Andrade Boo M.D.      CT Angiogram Chest, CT Abdomen Pelvis With Contrast    Result Date: 5/12/2022  Narrative: CT ANGIOGRAM CHEST-, CT ABDOMEN PELVIS W CONTRAST-  CLINICAL HISTORY: Dyspnea. History of esophageal carcinoma. Status post esophagectomy.  TECHNIQUE: Spiral CT images were obtained through the chest during rapid IV injection of contrast and were reconstructed in 2 mm thick axial slices. Subsequently images of the abdomen and pelvis were obtained with IV contrast.  Radiation dose reduction techniques were utilized, including automated exposure control and exposure modulation based on body size.  COMPARISON: CT imaging of the chest abdomen and pelvis dated 03/22/2022.  FINDINGS: The main pulmonary arteries and their lobar and segmental branches are well opacified and demonstrate no filling defects. There is no CT evidence of acute pulmonary thromboembolism. The thoracic aorta was also well opacified and is unremarkable. The patient is status post esophagectomy with gastric pull-through procedure. The pull-through appears unremarkable and unchanged. There is no mediastinal or hilar or axillary lymphadenopathy. Lung window images demonstrate multiple curvilinear areas of abnormal increased density in the inferior aspect of the right lung that are consistent with chronic fibrotic scarring. These are unchanged. There are no discrete lung masses. There is mild peribronchial thickening in both lower lung zones and also numerous reticulonodular opacities scattered throughout both lungs that are new since the preceding CT scan and are consistent with sequela of bronchitis. Minimal groundglass opacities are also noted suspicious for pneumonia, most prominent in the lingula. A tiny loculated left pleural effusion is also unchanged.  The liver, spleen and adrenal glands are unremarkable. There is a small  approximately 3 mm diameter nonobstructing lower pole left renal calculus that appears essentially unchanged. The kidneys are otherwise unremarkable. The neck of the pancreas is retracted superiorly into the anterior aspect of the aortic hiatus due to the surgery. However, this is new since the preceding CT scan dated 03/20/2022. The pancreas is otherwise unremarkable. The small bowel and colon appear within normal limits. No lymphadenopathy is identified in the abdomen or pelvis. The prostate gland appears markedly atrophic or surgically absent.      Impression: There is no CT evidence of acute pulmonary thromboembolism. There is mild peribronchial thickening in both lower lung zones and also patchy reticulonodular opacities in both lower lung zones that are new since the preceding CT dated 03/20/2022 and are consistent with sequela of bronchitis. Minimal patchy groundglass infiltrate is also present, most prominent in the lingula. Postoperative changes as described. There is no evidence of recurrent esophageal carcinoma or metastatic disease within the chest abdomen or pelvis. There is a single tiny nonobstructing lower pole left renal calculus. No acute process is evident in the abdomen or pelvis.  This report was finalized on 5/12/2022 5:30 PM by Dr. Rios Denny M.D.         Medical Tests:        Summary of old records / correspondence / consultant report:   DC summary re: issues addressed on HPI    Request outside records:         MEDICATIONS   Current Outpatient Medications   Medication Sig Dispense Refill   • albuterol sulfate  (90 Base) MCG/ACT inhaler Inhale 1 puff Every 4 (Four) Hours As Needed for Wheezing.     • apixaban (ELIQUIS) 5 MG tablet tablet Take 1 tablet by mouth Every 12 (Twelve) Hours. Indications: Atrial Fibrillation 60 tablet 1   • Cyanocobalamin 1000 MCG/ML kit Inject 1 mL as directed Every 30 (Thirty) Days. Intramuscularly. 1 kit 11   • [START ON 5/17/2022] furosemide (LASIX) 20  "MG tablet Take 1 tablet by mouth Daily. 30 tablet 5   • Gemtesa 75 MG tablet Daily.     • ketoconazole (NIZORAL) 2 % cream      • metoprolol tartrate (LOPRESSOR) 25 MG tablet TAKE ONE TABLET BY MOUTH TWICE A  tablet 3   • pantoprazole (PROTONIX) 40 MG EC tablet TAKE ONE TABLET BY MOUTH TWICE A DAY BEFORE A MEAL 180 tablet 3   • PARoxetine (PAXIL) 40 MG tablet TAKE ONE TABLET BY MOUTH EVERY MORNING 30 tablet 5   • potassium chloride (MICRO-K) 10 MEQ CR capsule TAKE ONE CAPSULE BY MOUTH DAILY 30 capsule 5   • pramipexole (MIRAPEX) 1.5 MG tablet TAKE ONE TABLET BY MOUTH TWICE A DAY (Patient taking differently: 3 mg Every Night. 2 tabs of 1.5 mg) 180 tablet 1   • Syringe 25G X 1\" 3 ML misc Use along with B12 . 1 ml into the appropriate side of the muscle once every 30 days. 50 each 1   • atorvastatin (LIPITOR) 20 MG tablet Take 2 tablets by mouth Daily. 90 tablet 0   • atorvastatin (LIPITOR) 40 MG tablet Take 1 tablet by mouth Every Night. 90 tablet 0     No current facility-administered medications for this visit.       Current outpatient and discharge medications have been reconciled for the patient.  Reviewed by: Brandin Bolton MD         Examiner was wearing KN95 mask and exam gloves during the entire duration of the visit. Patient was masked the entire time.   Minimum social distance of 6 ft maintained entire visit except if physical contact was necessary as documented.   "

## 2022-05-16 NOTE — DISCHARGE SUMMARY
Patient Name: Jose Hurtado  : 1950  MRN: [id number]    Date of Admission: 2022  Date of Discharge:  5/15/2022  Primary Care Physician: Brandin Bolton MD      Chief Complaint:   Abdominal Pain      Discharge Diagnoses     Active Hospital Problems    Diagnosis  POA   • **NSTEMI (non-ST elevated myocardial infarction) (HCC) [I21.4]  Yes   • Bronchitis [J40]  Yes   • CKD (chronic kidney disease) stage 2, GFR 60-89 ml/min [N18.2]  Yes   • Chronic anticoagulation [Z79.01]  Not Applicable   • COPD (chronic obstructive pulmonary disease) (HCC) [J44.9]  Yes   • Hypertension [I10]  Yes   • Adenocarcinoma of esophagus (HCC) [C15.9]  Yes      Resolved Hospital Problems   No resolved problems to display.        Hospital Course     Mr. Hurtado is a 72 y.o. male former smoker with a history of chronic kidney disease 2-3, history of esophageal cancer status post esophagectomy with gastric pull-through in  and chemo and radiation therapy who is in remission, COPD, hypertension, history of recurrent DVT on Eliquis, iron deficiency anemia, restless leg syndrome, and depression who presented with a cough associated with retching, and abdominal pain.  On admission he denied any active chest pain, fever, chills, sore throat, muscle pain.  He reported that his cough was nonproductive.  He underwent initial work-up in the emergency department with a CTA of the chest and a CT of the abdomen and pelvis which showed some evidence of bronchitis, or PE, and did show some groundglass infiltrate in the lingula.  Work-up in the emergency room also revealed an elevated troponin.  An elevation in his troponin and he was noted to have nonspecific ST and T wave findings on twelve-lead EKG.  Cardiology was notified of elevated troponin (NSTEMI) and cardiology agreed to consult with admission to internal medicine for further evaluation and management. Please see the admitting history and physical for further details.  He was given dose  of Rocephin in ED for  possible bronchitis.   Pro-Gonzalez was noted to be slightly elevated at 0.3.  However strep pneumoniae no, Legionella urine antigens were negative.  There was not enough sputum to culture and patient remained afebrile with no leukocytosis antibiotics were discontinued.  He remained afebrile with normal white count off antibiotic.      Cardiology felt chest pain could be related to demand from severe retching in setting of abnormal kidney function from dehydration but he had a recent admission with similar symptoms and also was noted to have elevated troponin.  Cardiology thus recommended cardiac cath to rule out any major obstructive coronary disease.  Because he is on chronic Eliquis cardiology recommended holding Eliquis and prepping for cardiac cath over the weekend.       Patient underwent cardiac cath on 5/14/2022 which showed normal coronary arteries with normal ejection fraction noted on both echo and cardiac cath.  Recommendations by cardiology was for patient to resume Eliquis Monday, 5/16/2022.      Following his cardiac cath, Speech therapy was consulted with recommendations for soft textures chopped and thin liquids with aspiration precautions. He denied any further Chest pain during the course of his stay.  He stated that SLP recommendations for upright sitting while eating he felt helped.  He tolerated diet well and denied any issues with coughing, nausea, vomiting, or retching.     5/15/2022 post cath lab findings noted slight rise in creatinine 1.3 to.Up from 1.18.  It appears back on 5/12-day of admission that his creatinine was 1.28.  Noted in past in March that his creatinine was as high as 1.5.  Lasix was placed on hold.  We are still pending thoracic surgery evaluation so plans were to keep patient overnight and plan for recheck of BMP on Monday while awaiting thoracic surgery evaluation.   Thoracic surgery was consulted due to his previous history of adenocarcinoma with  esophagectomy to have them weigh in.  Dr. Capone evaluated patient late afternoon on Sunday and felt he did not have any symptoms that warranted need to undergo EGD at this time.  She suspected that his symptoms of chest pain and cough are related to his eating habit and recommended that he not eat 3 hours prior to lying down and that when he does lay down he would need to be on a wedge pillow for at at at least 30 degrees of elevation.  She did note CT scan findings of some groundglass opacities that she felt was consistent with aspiration secondary to his esophagectomy and need for gravity to drain in the stomach   From her perspective he could be discharged home and recommended follow-up with Dr. Christine at his previously scheduled visit.    Post cath lab findings noted slight rise in creatinine 1.3 to.  Up from 1.18.  It appears back on 5/12-day of admission that his creatinine was 1.28.  Noted in March that his creatinine was as high as 1.5. Following thoracic surgery evaluation patient was quite anxious for discharge home.  Cardiology service did clear patient for discharge earlier in the day.  Patient clinically on exam looks good.  He is not having any further symptoms of chest pain, shortness of breath, retching, nausea, vomiting, or abdominal pain.  He been up ambulating in the hallway with assistance without any dyspnea on exertion or anginal symptoms.  She remained stable sinus rhythm with a bundle branch block.  It was felt he was stable for discharge with close follow-up on outpatient basis with lab work 1 to 2 days following discharge with his primary care provider to reassess BMP.  He was given instructions to hold his Lasix through Monday.  He was also given instruction to not resume Eliquis until Monday morning 5//16 as outlined by cardiology recommendation.  Patient was in agreement to discharge home with outpatient lab work.  I discussed with Dr. Julio who was okay from her standpoint for patient  to be discharged now that thoracic surgery and cardiology both cleared him for discharge from their point of view.  He has had an NSTEMI by laboratory findings.  This may qualify him for outpatient cardiac rehab.  I did place a referral for them to make contact and discuss if he meets criteria.     Day of Discharge     Subjective:  Patient feels good today.  Denies any chest pain, pressure, shortness of breath, abdominal pain, nausea, vomiting, cough, dry heaving or retching.  He has been up ambulating in the hallway without difficulty.  He is anxious for discharge home.  He denies any fevers or chills    Physical Exam:  Temp:  [97.3 °F (36.3 °C)-98.4 °F (36.9 °C)] 97.3 °F (36.3 °C)  Heart Rate:  [54-64] 61  Resp:  [16] 16  BP: (136-148)/(71-82) 141/71  Body mass index is 24.36 kg/m².     Physical Exam  Vitals and nursing note reviewed.   Constitutional:       General: He is not in acute distress.     Appearance: He is ill-appearing (Chronically ill-appearing).      Comments: Thin   HENT:      Mouth/Throat:      Mouth: Mucous membranes are moist.      Pharynx: Oropharynx is clear.   Eyes:      General: Scleral icterus present.      Conjunctiva/sclera: Conjunctivae normal.   Cardiovascular:      Rate and Rhythm: Normal rate and regular rhythm.      Pulses: Normal pulses.      Heart sounds: Normal heart sounds.   Pulmonary:      Effort: Pulmonary effort is normal. No respiratory distress.      Breath sounds: Normal breath sounds. No wheezing or rales.   Abdominal:      General: Abdomen is flat. Bowel sounds are normal. There is no distension.      Palpations: Abdomen is soft.      Tenderness: There is no abdominal tenderness.   Musculoskeletal:      Right lower leg: No edema (Trace edema).      Left lower leg: Edema (Trace to 1+) present.      Comments: Patient reports chronic lower extremity edema.  He states left thigh swelling and always greater than right   Skin:     General: Skin is warm and dry.   Neurological:       General: No focal deficit present.      Mental Status: He is alert and oriented to person, place, and time. Mental status is at baseline.   Psychiatric:         Mood and Affect: Mood normal.         Behavior: Behavior normal.         Thought Content: Thought content normal.         Judgment: Judgment normal.            Consultants     Consult Orders (all) (From admission, onward)     Start     Ordered    05/15/22 1000  Inpatient Thoracic Surgery Consult  Once        Specialty:  Thoracic Surgery  Provider:  Jose Christine III, MD    05/15/22 1000    05/14/22 0744  Inpatient Cardiothoracic Surgery Consult  Once,   Status:  Canceled        Specialty:  Cardiothoracic Surgery  Provider:  Khai Smith MD    05/14/22 0743    05/13/22 1530  Inpatient Gastroenterology Consult  Once,   Status:  Canceled        Specialty:  Gastroenterology  Provider:  Mulugeta Millan MD    05/13/22 1530    05/12/22 1856  Inpatient Cardiology Consult  Once        Specialty:  Cardiology  Provider:  James Cyr MD    05/12/22 1857    05/12/22 1802  [facility] (on-call MD unless specified) Details  Once,   Status:  Canceled        Specialty:  Hospitalist  Provider:  (Not yet assigned)    05/12/22 1802    05/12/22 1744  [facility] (on-call MD unless specified)  Once,   Status:  Canceled        Specialty:  Cardiology  Provider:  (Not yet assigned)    05/12/22 1743              Procedures     Left Heart Cath, Coronary angiography, Left ventriculography      Imaging Results (All)     Procedure Component Value Units Date/Time    CT Angiogram Chest [[id number]] Collected: 05/12/22 1710     Updated: 05/12/22 1733    Narrative:      CT ANGIOGRAM CHEST-, CT ABDOMEN PELVIS W CONTRAST-     CLINICAL HISTORY: Dyspnea. History of esophageal carcinoma. Status post  esophagectomy.     TECHNIQUE: Spiral CT images were obtained through the chest during rapid  IV injection of contrast and were reconstructed in 2 mm thick axial  slices.  Subsequently images of the abdomen and pelvis were obtained with  IV contrast.     Radiation dose reduction techniques were utilized, including automated  exposure control and exposure modulation based on body size.     COMPARISON: CT imaging of the chest abdomen and pelvis dated 03/22/2022.     FINDINGS: The main pulmonary arteries and their lobar and segmental  branches are well opacified and demonstrate no filling defects. There is  no CT evidence of acute pulmonary thromboembolism. The thoracic aorta  was also well opacified and is unremarkable. The patient is status post  esophagectomy with gastric pull-through procedure. The pull-through  appears unremarkable and unchanged. There is no mediastinal or hilar or  axillary lymphadenopathy. Lung window images demonstrate multiple  curvilinear areas of abnormal increased density in the inferior aspect  of the right lung that are consistent with chronic fibrotic scarring.  These are unchanged. There are no discrete lung masses. There is mild  peribronchial thickening in both lower lung zones and also numerous  reticulonodular opacities scattered throughout both lungs that are new  since the preceding CT scan and are consistent with sequela of  bronchitis. Minimal groundglass opacities are also noted suspicious for  pneumonia, most prominent in the lingula. A tiny loculated left pleural  effusion is also unchanged.     The liver, spleen and adrenal glands are unremarkable. There is a small  approximately 3 mm diameter nonobstructing lower pole left renal  calculus that appears essentially unchanged. The kidneys are otherwise  unremarkable. The neck of the pancreas is retracted superiorly into the  anterior aspect of the aortic hiatus due to the surgery. However, this  is new since the preceding CT scan dated 03/20/2022. The pancreas is  otherwise unremarkable. The small bowel and colon appear within normal  limits. No lymphadenopathy is identified in the abdomen or  pelvis. The  prostate gland appears markedly atrophic or surgically absent.       Impression:      There is no CT evidence of acute pulmonary thromboembolism.  There is mild peribronchial thickening in both lower lung zones and also  patchy reticulonodular opacities in both lower lung zones that are new  since the preceding CT dated 03/20/2022 and are consistent with sequela  of bronchitis. Minimal patchy groundglass infiltrate is also present,  most prominent in the lingula. Postoperative changes as described. There  is no evidence of recurrent esophageal carcinoma or metastatic disease  within the chest abdomen or pelvis. There is a single tiny  nonobstructing lower pole left renal calculus. No acute process is  evident in the abdomen or pelvis.     This report was finalized on 5/12/2022 5:30 PM by Dr. Rios Denny M.D.       CT Abdomen Pelvis With Contrast [[id number]] Collected: 05/12/22 1710     Updated: 05/12/22 1733    Narrative:      CT ANGIOGRAM CHEST-, CT ABDOMEN PELVIS W CONTRAST-     CLINICAL HISTORY: Dyspnea. History of esophageal carcinoma. Status post  esophagectomy.     TECHNIQUE: Spiral CT images were obtained through the chest during rapid  IV injection of contrast and were reconstructed in 2 mm thick axial  slices. Subsequently images of the abdomen and pelvis were obtained with  IV contrast.     Radiation dose reduction techniques were utilized, including automated  exposure control and exposure modulation based on body size.     COMPARISON: CT imaging of the chest abdomen and pelvis dated 03/22/2022.     FINDINGS: The main pulmonary arteries and their lobar and segmental  branches are well opacified and demonstrate no filling defects. There is  no CT evidence of acute pulmonary thromboembolism. The thoracic aorta  was also well opacified and is unremarkable. The patient is status post  esophagectomy with gastric pull-through procedure. The pull-through  appears unremarkable and unchanged.  There is no mediastinal or hilar or  axillary lymphadenopathy. Lung window images demonstrate multiple  curvilinear areas of abnormal increased density in the inferior aspect  of the right lung that are consistent with chronic fibrotic scarring.  These are unchanged. There are no discrete lung masses. There is mild  peribronchial thickening in both lower lung zones and also numerous  reticulonodular opacities scattered throughout both lungs that are new  since the preceding CT scan and are consistent with sequela of  bronchitis. Minimal groundglass opacities are also noted suspicious for  pneumonia, most prominent in the lingula. A tiny loculated left pleural  effusion is also unchanged.     The liver, spleen and adrenal glands are unremarkable. There is a small  approximately 3 mm diameter nonobstructing lower pole left renal  calculus that appears essentially unchanged. The kidneys are otherwise  unremarkable. The neck of the pancreas is retracted superiorly into the  anterior aspect of the aortic hiatus due to the surgery. However, this  is new since the preceding CT scan dated 03/20/2022. The pancreas is  otherwise unremarkable. The small bowel and colon appear within normal  limits. No lymphadenopathy is identified in the abdomen or pelvis. The  prostate gland appears markedly atrophic or surgically absent.       Impression:      There is no CT evidence of acute pulmonary thromboembolism.  There is mild peribronchial thickening in both lower lung zones and also  patchy reticulonodular opacities in both lower lung zones that are new  since the preceding CT dated 03/20/2022 and are consistent with sequela  of bronchitis. Minimal patchy groundglass infiltrate is also present,  most prominent in the lingula. Postoperative changes as described. There  is no evidence of recurrent esophageal carcinoma or metastatic disease  within the chest abdomen or pelvis. There is a single tiny  nonobstructing lower pole left  renal calculus. No acute process is  evident in the abdomen or pelvis.     This report was finalized on 5/12/2022 5:30 PM by Dr. Rios Denny M.D.       XR Chest 2 View [[id number]] Collected: 05/12/22 1607     Updated: 05/12/22 1636    Narrative:      CHEST: 2 VIEWS     HISTORY: Cough and shortness of air.     COMPARISON: CT chest 03/22/2022, 2 view chest 08/06/2021.     FINDINGS:Heart size is enlarged. There has been previous  esophagogastrectomy with gastric pull-through extending along the medial  right lung base with adjacent compressive right lower lobe atelectasis.  There is a small right pleural effusion and there is right pleural  thickening, similar to the CT 03/22/2022. There are rib changes in the  right related to previous thoracotomy. There is no evidence of pulmonary  edema or pneumothorax. Left lung appears clear.       Impression:      Postsurgical changes in the right where there has been  thoracotomy, as well as esophagogastrectomy with gastric pull-through.  Compressive atelectasis right lower lobe with right pleural effusion and  pleural thickening. The findings are similar to previous chest CT  03/22/2022.     This report was finalized on 5/12/2022 4:32 PM by Dr. Andrade Boo M.D.       XR Chest 1 View [[id number]] Collected: 05/12/22 1626     Updated: 05/12/22 1634    Narrative:      CHEST SINGLE VIEW     HISTORY: Shortness of breath, abdominal pain     COMPARISON: Two-view chest 05/12/2022, CT chest 03/22/2022, 2 view chest  08/06/2021.     FINDINGS: Heart size is enlarged. There has been esophagectomy with  gastric pull-through and the stomach extends medial to the right lower  lobe with adjacent compressive right lower lobe atelectasis. There is a  small right pleural effusion and there is right-sided pleural  thickening. Rib changes are present on the right related to previous  right thoracotomy. There is no evidence for change compared to the 2  view chest obtained just prior to  this exam. Left lung appears clear.       Impression:      Postsurgical changes on the right with where there has been  thoracotomy with esophagogastrectomy and gastric pull-through. There is  compressive atelectasis in the right lower lobe. Right pleural effusion  is present and there is right-sided pleural thickening. No evidence for  change compared to the PA and lateral chest performed just previous to  this exam.     This report was finalized on 5/12/2022 4:31 PM by Dr. Andrade Boo M.D.           Results for orders placed in visit on 03/24/20    Duplex Venous Lower Extremity - Bilateral CAR    Interpretation Summary  · Chronic right lower extremity superficial thrombophlebitis noted in the greater saphenous (below knee) and small saphenous.  · Acute left lower extremity deep vein thrombosis noted in the popliteal and gastrocnemius.  · All other veins appeared normal bilaterally.    Results for orders placed during the hospital encounter of 05/12/22    Adult Transthoracic Echo Complete w/ Color, Spectral and Contrast if necessary per protocol    Interpretation Summary  · Estimated right ventricular systolic pressure from tricuspid regurgitation is mildly elevated (35-45 mmHg). Calculated right ventricular systolic pressure from tricuspid regurgitation is 43 mmHg.  · Left ventricular wall thickness is consistent with mild concentric hypertrophy.  · Estimated left ventricular EF = 56% Left ventricular systolic function is normal.  · Left ventricular diastolic function was normal.    Pertinent Labs     Results from last 7 days   Lab Units 05/15/22  0323 05/14/22  0603 05/13/22  0612 05/12/22  1457   WBC 10*3/mm3 4.78 4.79 5.67 5.01   HEMOGLOBIN g/dL 11.6* 11.4* 11.0* 12.8*   PLATELETS 10*3/mm3 142 127* 137* 157     Results from last 7 days   Lab Units 05/15/22  0323 05/14/22  0603 05/13/22  0612 05/12/22  1457   SODIUM mmol/L 137 138 139 140   POTASSIUM mmol/L 4.2 4.3 4.2 4.1   CHLORIDE mmol/L 106 106 108*  106   CO2 mmol/L 23.0 23.0 23.7 24.0   BUN mg/dL 19 18 18 19   CREATININE mg/dL 1.32* 1.18 1.15 1.28*   GLUCOSE mg/dL 111* 108* 99 112*   EGFR mL/min/1.73 56.6* 64.8 66.8 58.7*     Results from last 7 days   Lab Units 05/12/22  1457   ALBUMIN g/dL 3.90   BILIRUBIN mg/dL 0.5   ALK PHOS U/L 113   AST (SGOT) U/L 25   ALT (SGPT) U/L 19     Results from last 7 days   Lab Units 05/15/22  0323 05/14/22  0603 05/13/22  0612 05/12/22  1457   CALCIUM mg/dL 8.5* 8.5* 8.5* 8.9   ALBUMIN g/dL  --   --   --  3.90       Results from last 7 days   Lab Units 05/14/22  1822 05/14/22  1325 05/14/22  0603 05/13/22  2346 05/12/22  1716 05/12/22  1457   TROPONIN T ng/mL 0.070* 0.075* 0.084* 0.096*   < > 0.085*   PROBNP pg/mL  --   --   --   --   --  420.0    < > = values in this interval not displayed.           Invalid input(s): LDLCALC  Results from last 7 days   Lab Units 05/12/22  2310   MRSAPCR  No MRSA Detected     Results from last 7 days   Lab Units 05/12/22  1633   COVID19  Not Detected       Test Results Pending at Discharge       Discharge Details        Discharge Medications      New Medications      Instructions Start Date   atorvastatin 40 MG tablet  Commonly known as: LIPITOR   40 mg, Oral, Nightly         Changes to Medications      Instructions Start Date   apixaban 5 MG tablet tablet  Commonly known as: ELIQUIS  What changed:   · how much to take  · when to take this  Notes to patient: DO NOT TAKE NEXT DOSE UNTIL 05/16/2022   5 mg, Oral, Every 12 Hours Scheduled   Start Date: May 16, 2022     furosemide 20 MG tablet  Commonly known as: LASIX  What changed: These instructions start on May 17, 2022. If you are unsure what to do until then, ask your doctor or other care provider.  Notes to patient: DO NOT TAKE DOSE TOMORROW, RESUME TAKING ON 05/17/2022   20 mg, Oral, Daily   Start Date: May 17, 2022     pramipexole 1.5 MG tablet  Commonly known as: MIRAPEX  What changed:   · how much to take  · how to take this  · when to  "take this  · additional instructions   TAKE ONE TABLET BY MOUTH TWICE A DAY         Continue These Medications      Instructions Start Date   albuterol sulfate  (90 Base) MCG/ACT inhaler  Commonly known as: PROVENTIL HFA;VENTOLIN HFA;PROAIR HFA   1 puff, Inhalation, Every 4 Hours PRN      Cyanocobalamin 1000 MCG/ML kit   1 mL, Injection, Every 30 Days, Intramuscularly.      Gemtesa 75 MG tablet  Generic drug: Vibegron   Daily      ketoconazole 2 % cream  Commonly known as: NIZORAL   No dose, route, or frequency recorded.      metoprolol tartrate 25 MG tablet  Commonly known as: LOPRESSOR   TAKE ONE TABLET BY MOUTH TWICE A DAY      pantoprazole 40 MG EC tablet  Commonly known as: PROTONIX   TAKE ONE TABLET BY MOUTH TWICE A DAY BEFORE A MEAL      PARoxetine 40 MG tablet  Commonly known as: PAXIL   TAKE ONE TABLET BY MOUTH EVERY MORNING      potassium chloride 10 MEQ CR capsule  Commonly known as: MICRO-K   TAKE ONE CAPSULE BY MOUTH DAILY      Syringe 25G X 1\" 3 ML misc   Use along with B12 . 1 ml into the appropriate side of the muscle once every 30 days.             No Known Allergies    Discharge Disposition:  Home or Self Care      Discharge Diet:  Educated patient on continuation of dysphagia diet and aspiration precautions.    Discharge Activity:   Activity Instructions     Activity as Tolerated            CODE STATUS:    Code Status and Medical Interventions:   Ordered at: 05/12/22 2132     Level Of Support Discussed With:    Patient     Code Status (Patient has no pulse and is not breathing):    CPR (Attempt to Resuscitate)     Medical Interventions (Patient has pulse or is breathing):    Full Support       Future Appointments   Date Time Provider Department Center   5/31/2022  9:00 AM LAB CHAIR 1 [facility]   5/31/2022  9:40 AM George Phelan II, MD [facility]   7/15/2022 10:15 AM Brandin Bolton MD [facility]   9/21/2022 10:45 AM James Cyr MD [facility] " [facility]   3/29/2023 10:00 AM TONIE CT 2 [facility]   4/3/2023 10:00 AM Katarina Capone MD [facility]     Additional Instructions for the Follow-ups that You Need to Schedule     Ambulatory Referral to Cardiac Rehab   As directed         Follow-up Information     Brandin Bolton MD Follow up in 2 day(s).    Specialty: Internal Medicine  Why: You will need follow-up with your PCP in 7 days.  You will need to obtain nonfasting basic metabolic panel with your primary care provider in the next 1 to 2 days to recheck kidney function.  Contact information:  [address]  [facility] [address]  [place] [id number]  [phone]             Jose Christine III, MD Follow up.    Specialty: Thoracic Surgery  Why: Please keep your current follow-up appointment with Dr. Christine.  Contact information:  [address]  [facility] [address]  [place] [address]  [phone]                         Additional Instructions for the Follow-ups that You Need to Schedule     Ambulatory Referral to Cardiac Rehab   As directed        Time Spent on Discharge:  Greater than 30 minutes      FAYE White  [facility]  05/15/22  23:14 EDT

## 2022-05-16 NOTE — ASSESSMENT & PLAN NOTE
Noted to have slight increase in troponin which was most likely due to demand ischemia.  Heart catheterization during hospitalization was normal.  He has no ongoing symptoms suggestive of angina.  He was advised to continue atorvastatin which was started in the hospital until he follows up with his cardiologist.

## 2022-05-17 ENCOUNTER — DOCUMENTATION (OUTPATIENT)
Dept: INTERNAL MEDICINE | Age: 72
End: 2022-05-17

## 2022-05-17 DIAGNOSIS — R79.89 INCREASE IN CREATININE: Primary | ICD-10-CM

## 2022-05-17 LAB
BUN SERPL-MCNC: 25 MG/DL (ref 8–27)
BUN/CREAT SERPL: 18 (ref 10–24)
CALCIUM SERPL-MCNC: 9.2 MG/DL (ref 8.6–10.2)
CHLORIDE SERPL-SCNC: 102 MMOL/L (ref 96–106)
CO2 SERPL-SCNC: 20 MMOL/L (ref 20–29)
CREAT SERPL-MCNC: 1.42 MG/DL (ref 0.76–1.27)
EGFRCR SERPLBLD CKD-EPI 2021: 52 ML/MIN/1.73
GLUCOSE SERPL-MCNC: 97 MG/DL (ref 65–99)
POTASSIUM SERPL-SCNC: 5.1 MMOL/L (ref 3.5–5.2)
SODIUM SERPL-SCNC: 140 MMOL/L (ref 134–144)

## 2022-05-20 RX ORDER — FUROSEMIDE 20 MG/1
20 TABLET ORAL EVERY OTHER DAY
Qty: 15 TABLET | Refills: 0
Start: 2022-05-20 | End: 2022-07-15

## 2022-05-24 ENCOUNTER — TRANSCRIBE ORDERS (OUTPATIENT)
Dept: CARDIAC REHAB | Facility: HOSPITAL | Age: 72
End: 2022-05-24

## 2022-05-24 ENCOUNTER — READMISSION MANAGEMENT (OUTPATIENT)
Dept: CALL CENTER | Facility: HOSPITAL | Age: 72
End: 2022-05-24

## 2022-05-24 ENCOUNTER — TELEPHONE (OUTPATIENT)
Dept: INTERNAL MEDICINE | Age: 72
End: 2022-05-24

## 2022-05-24 DIAGNOSIS — I21.4 NON-STEMI (NON-ST ELEVATED MYOCARDIAL INFARCTION): Primary | ICD-10-CM

## 2022-05-24 NOTE — TELEPHONE ENCOUNTER
Pt is scheduled to start [facility] Phase II on June 2, 2022.  I cannot find a recent lipid panel on his chart (last one I can find was on 5/10/2018).  He is on a statin drug and is post recent nstemi.  I noticed that he has a Metabolic Panel ordered by you that he has yet to have drawn.  Would it be possible to add a lipid panel to that lab work?  We use the results for patient education as well as for program statistics. Thank you for helping us take care of your patient!

## 2022-05-24 NOTE — OUTREACH NOTE
AMI Week 2 Survey    Flowsheet Row Responses   [facility] facility patient discharged from? [place]   Does the patient have one of the following disease processes/diagnoses(primary or secondary)? Acute MI (STEMI,NSTEMI)   Week 2 attempt successful? No   Unsuccessful attempts Attempt 1          KHOA LITTLE - Registered Nurse

## 2022-05-24 NOTE — TELEPHONE ENCOUNTER
Notified patient of lab results - patient stated that he would have labs completed at CBC clinic.

## 2022-05-27 NOTE — PROGRESS NOTES
Subjective .     REASONS FOR FOLLOWUP:  Esophageal cancer, cytopenias     HISTORY OF PRESENT ILLNESS:  The patient is a 72 y.o. year old male  who is here for follow-up with the above-mentioned history.    No new problems.  No bleeding.  No chest pain or SOA.  No dysphagia or odynophagia.    Past Medical History:   Diagnosis Date   • Acute deep vein thrombosis (DVT) of popliteal vein of left lower extremity (HCC) 03/24/2020   • Anemia    • Anti-phospholipid syndrome (HCC)     On lifelong anticoagulation therapy   • Bladder disorder     LEAKAGE   ON MED  wers pads   • Bleeding hemorrhoids    • Community acquired pneumonia     HISTORY OF IN 2014   • Deep venous thrombosis (HCC) 2006, 2008    Left lower extremity multiple   • Depression    • Esophageal carcinoma (HCC) 12/31/2014    had chemo and radiation prior surgery   • Hemorrhoids    • HH (hiatus hernia)    • History of atrial fibrillation 2015    ONE EPISODE WHILE HOSPITALIZED   • History of kidney stones    • History of nephrolithiasis    • History of pancreatitis     PT STATES MANY YEARS AGO   • History of radiation therapy    • History of transfusion    • Hypertension    • Long-term (current) use of anticoagulants, INR goal 2.0-3.0    • Lymphedema    • Malignant neoplasm of prostate (HCC)    • Other hyperlipidemia 1/30/2018 January 30, 2018 lipid panel risk 12.8%   • Restless legs syndrome    • Sleep apnea     OCCASIONALLY WEARS CPAP   • Squamous carcinoma     on the head     Past Surgical History:   Procedure Laterality Date   • APPENDECTOMY  1950   • BRONCHOSCOPY      (Diagnostic)   • CARDIAC CATHETERIZATION N/A 5/14/2022    Procedure: Left Heart Cath;  Surgeon: Eric So MD;  Location: [facility] CATH INVASIVE LOCATION;  Service: Cardiology;  Laterality: N/A;   • CARDIAC CATHETERIZATION N/A 5/14/2022    Procedure: Coronary angiography;  Surgeon: Eric So MD;  Location:  [facility] CATH INVASIVE LOCATION;  Service: Cardiology;   Laterality: N/A;   • CARDIAC CATHETERIZATION N/A 5/14/2022    Procedure: Left ventriculography;  Surgeon: Eric So MD;  Location: [facility] CATH INVASIVE LOCATION;  Service: Cardiology;  Laterality: N/A;   • CATARACT EXTRACTION Bilateral 2014   • COLONOSCOPY  12/15/2014    Complete / Description: EH, IH, torts, stool, follow-up colonoscopy due in 5 years.   • COLONOSCOPY N/A 6/13/2017    non-thrombosed external hemorrhoids, normal examined ileum, IH   • COLONOSCOPY N/A 2/26/2019    Procedure: COLONOSCOPY with Cold Polypectomy;  Surgeon: Mulugeta Millan MD;  Location: [facility] ENDOSCOPY;  Service: Gastroenterology   • COLONOSCOPY N/A 12/16/2020    Procedure: COLONOSCOPY to cecum into TI;  Surgeon: Mesha Sanchez MD;  Location: [facility] ENDOSCOPY;  Service: Gastroenterology;  Laterality: N/A;  pre: lower GI bleed  post: hemorrhoids    • CYSTOSCOPY W/ LASER LITHOTRIPSY     • ENDOSCOPY N/A 6/13/2017    Procedure: ESOPHAGOGASTRODUODENOSCOPY WITH COLD BIOPSY;  Surgeon: Lake Gonzalez MD;  Location: [facility] ENDOSCOPY;  Service:    • ENDOSCOPY N/A 11/18/2021    Procedure: ESOPHAGOGASTRODUODENOSCOPY WITH  DILATATION WITH FLUOROSCOPY;  Surgeon: Jose Christine III, MD;  Location: [facility] ENDOSCOPY;  Service: Gastroenterology;  Laterality: N/A;  Pre: dysphagia, h/x of adinocarcinoma of esophagus  Post: same   • ESOPHAGECTOMY      April 2015, stage IIB esophageal carcinoma, sub-total resection.   • ESOPHAGECTOMY      Esophagectomy Subtotal [place] Procedure   • EXCISION LESION  08/2012    Removal of Squamous Cell CA on Head   • HAMMER TOE REPAIR  09/2014    Hammertoe Operation (Each Toe), 10/2014   • HAMMER TOE REPAIR Left 10/3/2017    Procedure: Left second third and fourth distal interphalangeal joint resection with flexor tenotomy;  Surgeon: Mulugeta Lira MD;  [place] [facility] [place] [facility]  Service:    • HEMORRHOIDECTOMY N/A 12/23/2020    Procedure: HEMORRHOIDECTOMY;  Surgeon: Billy Julio  MD Lane[place];  Location: [facility] MAIN OR;  Service: General;  Laterality: N/A;   • HERNIA REPAIR      incisional   • JEJUNOSTOMY      Laparoscopic   • JEJUNOSTOMY      tube removal    • KNEE SURGERY Bilateral 1967, 1973, 1981   • PATELLA SURGERY Left     removed   • PILONIDAL CYST / SINUS EXCISION     • CA TOTAL KNEE ARTHROPLASTY Right 3/26/2018    Procedure: TOTAL KNEE ARTHROPLASTY;  Surgeon: Renny Solis MD;  Location: [facility] MAIN OR;  Service: Orthopedics   • PROSTATECTOMY  2010   • PYLOROPLASTY     • SPINAL FUSION  02/1998    C 5,6   • TONSILLECTOMY     • UPPER GASTROINTESTINAL ENDOSCOPY  12/15/2014    LA Grade D esophagitis, Pardo's, HH, multiple duodenal ulcers   • VENTRAL/INCISIONAL HERNIA REPAIR N/A 4/14/2016    Procedure: VENTRAL/INCISIONAL HERNIA REPAIR, open, with mesh, and component separation;  Surgeon: Darren Rivas MD;  Location: [facility] MAIN OR;  Service:        HEMATOLOGIC/ONCOLOGIC HISTORY:  (History from previous dates can be found in the separate document.)    MEDICATIONS    Current Outpatient Medications:   •  albuterol sulfate  (90 Base) MCG/ACT inhaler, Inhale 1 puff Every 4 (Four) Hours As Needed for Wheezing., Disp: , Rfl:   •  apixaban (ELIQUIS) 5 MG tablet tablet, Take 1 tablet by mouth Every 12 (Twelve) Hours. Indications: Atrial Fibrillation, Disp: 60 tablet, Rfl: 1  •  atorvastatin (LIPITOR) 20 MG tablet, Take 2 tablets by mouth Daily., Disp: 90 tablet, Rfl: 0  •  atorvastatin (LIPITOR) 40 MG tablet, Take 1 tablet by mouth Every Night., Disp: 90 tablet, Rfl: 0  •  cyanocobalamin 1000 MCG/ML injection, , Disp: , Rfl:   •  Cyanocobalamin 1000 MCG/ML kit, Inject 1 mL as directed Every 30 (Thirty) Days. Intramuscularly., Disp: 1 kit, Rfl: 11  •  furosemide (LASIX) 20 MG tablet, Take 1 tablet by mouth Every Other Day. 1 tablet every other day., Disp: 15 tablet, Rfl: 0  •  Gemtesa 75 MG tablet, Daily., Disp: , Rfl:   •  ketoconazole (NIZORAL) 2 % cream, , Disp: , Rfl:   •   "metoprolol tartrate (LOPRESSOR) 25 MG tablet, TAKE ONE TABLET BY MOUTH TWICE A DAY, Disp: 180 tablet, Rfl: 3  •  pantoprazole (PROTONIX) 40 MG EC tablet, TAKE ONE TABLET BY MOUTH TWICE A DAY BEFORE A MEAL, Disp: 180 tablet, Rfl: 3  •  PARoxetine (PAXIL) 40 MG tablet, TAKE ONE TABLET BY MOUTH EVERY MORNING, Disp: 30 tablet, Rfl: 5  •  potassium chloride (MICRO-K) 10 MEQ CR capsule, TAKE ONE CAPSULE BY MOUTH DAILY, Disp: 30 capsule, Rfl: 5  •  pramipexole (MIRAPEX) 1.5 MG tablet, TAKE ONE TABLET BY MOUTH TWICE A DAY (Patient taking differently: 3 mg Every Night. 2 tabs of 1.5 mg), Disp: 180 tablet, Rfl: 1  •  Syringe 25G X 1\" 3 ML misc, Use along with B12 . 1 ml into the appropriate side of the muscle once every 30 days., Disp: 50 each, Rfl: 1    ALLERGIES:   No Known Allergies    SOCIAL HISTORY:       Social History     Socioeconomic History   • Marital status:      Spouse name: Lena   • Number of children: 0   • Years of education: College   Tobacco Use   • Smoking status: Former Smoker     Packs/day: 2.00     Years: 3.00     Pack years: 6.00     Types: Cigarettes     Quit date:      Years since quittin.4   • Smokeless tobacco: Former User     Types: Chew   • Tobacco comment: no caffeine    Vaping Use   • Vaping Use: Never used   Substance and Sexual Activity   • Alcohol use: Yes     Alcohol/week: 3.0 standard drinks     Types: 1 Glasses of wine, 1 Cans of beer, 1 Shots of liquor per week     Comment: 1-2 drinks/week   • Drug use: Not Currently     Types: Marijuana     Comment: hx marijuana use \"a long time ago\"   • Sexual activity: Defer         FAMILY HISTORY:  Family History   Problem Relation Age of Onset   • Cerebral aneurysm Mother         cerebral artery aneurysm ( age 56)   • Prostate cancer Brother 68   • Anxiety disorder Father    • Suicide Attempts Father          of suicide   • Cancer Father         bladder   • No Known Problems Brother    • Pancreatic cancer Nephew    • Colon " "cancer Neg Hx    • Esophageal cancer Neg Hx    • Dementia Neg Hx    • Malig Hyperthermia Neg Hx        REVIEW OF SYSTEMS:    Review of Systems   Constitutional: Negative for activity change.   HENT: Negative for nosebleeds and trouble swallowing.    Respiratory: Negative for shortness of breath and wheezing.    Cardiovascular: Negative for chest pain and palpitations.   Gastrointestinal: Negative for constipation and diarrhea.   Genitourinary: Negative for dysuria and hematuria.   Musculoskeletal: Negative for arthralgias and myalgias.   Neurological: Negative for seizures and syncope.   Hematological: Negative for adenopathy. Does not bruise/bleed easily.   Psychiatric/Behavioral: Negative for confusion.           Objective    Vitals:    05/31/22 1615   BP: 145/67   Pulse: 75   Resp: 18   Temp: 97.1 °F (36.2 °C)   TempSrc: Temporal   SpO2: 94%   Weight: 94.2 kg (207 lb 11.2 oz)   Height: 195.6 cm (77.01\")   PainSc: 0-No pain     Current Status 5/31/2022   ECOG score 1      PHYSICAL EXAM:          CONSTITUTIONAL:  Vital signs reviewed.  No distress, looks comfortable.  EYES:  Conjunctiva and lids unremarkable.  PERRLA  EARS,NOSE,MOUTH,THROAT:  Ears and nose appear unremarkable.  Lips, teeth, gums appear unremarkable.  RESPIRATORY:  Normal respiratory effort.  Lungs clear to auscultation bilaterally.  CARDIOVASCULAR:  Normal S1, S2.  No murmurs rubs or gallops.    No change significant bilateral lower extremity edema (he has been to the lymphedema clinic for this)GASTROINTESTINAL: Abdomen appears unremarkable.  Nontender.  No hepatomegaly.  No splenomegaly.  LYMPHATIC:  No cervical, supraclavicular, axillary lymphadenopathy.  SKIN:  Warm.  No rashes.  PSYCHIATRIC:  Normal judgment and insight.  Normal mood and affect.              RECENT LABS:        WBC   Date/Time Value Ref Range Status   05/31/2022 04:04 PM 4.02 3.40 - 10.80 10*3/mm3 Final   04/15/2019 12:31 PM 3.53 3.40 - 10.80 10*3/mm3 Final   04/16/2018 04:25 " PM 4.23 (L) 4.5 - 11.0 10*3/uL Final     Hemoglobin   Date/Time Value Ref Range Status   05/31/2022 04:04 PM 11.4 (L) 13.0 - 17.7 g/dL Final   04/16/2018 04:25 PM 9.9 (L) 13.5 - 17.5 g/dL Final     Platelets   Date/Time Value Ref Range Status   05/31/2022 04:04  140 - 450 10*3/mm3 Final   04/16/2018 04:25  140 - 440 10*3/uL Final       Assessment/Plan     ASSESSMENT:  Iron deficiency  - Ferritin  - Iron Profile  - Retic With IRF & RET-He  - CBC & Differential      *Esophageal adenocarcinoma. Initially T2N0M0. After neoadjuvant carboplatin/Taxol with radiation, achieved a pathologic CR at resection, 4/24/2015.   · As per NCCN guidelines, plan CAT scans every 6 months x1 year, then every 6 to 9 months on years 2 and years 3. Defer any surveillance EGDs to Dr. Gonzalez if he feels they are appropriate.   · Also as per NCCN guidelines, plan H and P every 3 to 6 months on years 1 and years 2, then every 6 to 12 months' time on years 3 through years 5 and then annually.   · EGD 6/13/17 by Dr. Gonzalez: No evidence of recurrence.  · CT scan 3/2/18 (this completed 3 years of surveillance CT): No evidence of recurrence.  Plan no more surveillance CT.  · EGD 11/18/2021, Dr. Christine: No evidence of recurrent cancer.  Dilated.  No evidence of recurrence.  Remains in remission.    *Recurrent DVT, prior to cancer diagnosis.    · Dr. Bolton previously managed his Coumadin.   · Chronic left leg larger than right, since DVT  · Acute left popliteal, gastrocnemius DVT on 3/24/2020 despite INR 3.4.  Therefore, changed to Eliquis.  · On 3/25/2020, unremarkable: Lupus anticoagulant, beta-2 glycoprotein antibodies.  Anticardiolipin antibodies also felt to be unremarkable with IgG and IgM both at 15 (negative is <15.  Indeterminate is 15-20).  No evidence of recurrent DVT.    *CT angiogram chest 3/24/2020 (LLE acute DVT found 3/24/2020): Mild increased opacity LLL may represent developing infiltrate versus atelectasis.  Radiologist  recommended follow-up.  · CT chest 5/1/2020: Resolution of groundglass LLL infiltrate from the 3/24/2020 CT.  A few mildly enlarged mediastinal nodes are less prominent than on the 3/18/2020 CT.  No new abnormalities.  Plan no more follow-up CT scans.    *Persistent cough.  He has seen Dr. Maher Sayied for this.    He did not complain of this today.    *Previously complained of emesis occurring 5 hours after eating on average once every month or 2.  No nausea otherwise.  Denies dysphagia or odynophagia.    Unchanged.  Occurring on average once every couple of months.  He did not complain of this today    *Iron deficiency anemia  · Hb mostly around 11  · On 4/15/2019, ferritin 29.7, 13% saturation.  Serum folate normal.  · On oral iron daily through PCP.  · On 8/23/19, ferritin 65, 14%.   · Increased oral iron.   · On 10/15/19, ferritin 72, 10%.  · On 10/25/2019, stopped oral iron since it was not helping.    · Oral iron not effective due to poor absorption  · 2 doses Injectafer.  · On 12/13/2019, ferritin 708, 15% saturation, Hb 10.4.  · Therefore, no IV iron.  Monitor.  · On 5/5/2020, ferritin 346, 15% saturation, Hb 9.9.  Therefore, no need for IV iron at this time.  · On 7/7/2020, Hb up to 11.7.  Iron labs normal.  · Admission 12/15/2020: Hb 6.6.  Ferritin 40, iron saturation 17%.  Discharged on 12/21/2020 with Hb 7.1, which fell from 7.9 on 12/18/2020, from 8.9 in the early morning 12/18/2020.  The drop in Hb was thought to be due to hemorrhoidal bleeding related to Eliquis.  Eliquis was stopped.  Hemorrhoidal repair planned by Dr. Flynn Julio after the holidays.  Was given Venofer 300 mg on 12/17/2020 by Dr. Ross of our practice  · On 12/29/2020, ferritin 176, 13% saturation, Hb 8.4, reticulate hemoglobin low at 25.7.  Suspect he would benefit from some more iron.  Given 1 dose Injectafer.  · On 2/2/2021, ferritin 317, 17% saturation, Hb 10.3.  Therefore, Hb gradually improved since the 1 dose of  Injectafer.  · 3/2/2021: Ferritin iron 93, 11% saturation, Hb 11.9.  1 dose Injectafer.  · 3/23/2021: Ferritin 471, 20% saturation, Hb 10.8  · 7/6/2021: Ferritin 329, 21% saturation, Hb 10.8  · 9/28/2021: Ferritin 230, 20% saturation, Hb 11.4.  No need for IV iron.  · 12/28/2021: Ferritin 337, 6% iron saturation, Hb 10.4.  No IV iron given.  (Although saturation is low, ferritin is 337).  · 3/22/2022: Ferritin 112, 12% saturation, Hb 10.6.  Plan 1 dose Injectafer.  · 5/31/2022: Ferritin 473, 23% saturation, Hb 11.4    *Hemorrhoidal bleeding with Hb down to 6.6, requiring admission, 12/15/2020.  Thought to be related to Eliquis.  · Eliquis was stopped.  · Hemorrhoidal repair by Dr. Flynn Julio, 12/23/2020  · Eliquis subsequently restarted with no more issues with bleeding.  · 1 episode of dark stools yesterday, 9/27/2021.  Told him if he notices more of this he should contact Dr. Sanchez.  · 3/22/2022: Denies any rectal bleeding.  States this has not really been an issue since the hemorrhoidal repair    *9/28/2021: Change in stool frequency.  · Was having a bowel movement 3 times per week.  For the past week has been having 3 formed bowel movements per day.  I told him if this persists he should discuss with Dr. Sanchez.    *Source of iron deficiency.  · Last colonoscopy 2/26/2019 by Dr. Millan.  Last EGD 6/13/2017 by Dr. Gonzalez.  · Suspect he has an absorption issue due to previous esophageal surgery.    *B12 deficiency.  · On 6/6/2019, B12 <150  · On B12 injections through PCP.  · B12 on 5/5/2020 normal at 694  · B12 on 2/2/2021, 789    *Anemia for reasons in addition to iron deficiency and B12 deficiency  · Baseline Hb mostly 10.5-11.5.  · On 5/5/2020, unremarkable: RBC folate, iron labs, B12, haptoglobin, TB, LDH, direct Maki.  · Creatinine baseline is 0.9-1.2   · Hb 9.9 on 5/5/2020  · On 7/7/2020, Hb up to 11.7.  Return to prior follow-up plan.  · On 2/2/2021, B12 and RBC folate unremarkable  · 3/23/2021: Hb  10.8 despite normal iron stores.  · 5/25/2021, Hb 10.7 with normal iron labs  · 7/6/2021, Hb 10.8 with normal iron labs  · Hb 11.4    *Leukocytopenia  · New issue on 3/23/2021, WBC 3.38, based on recent labs, but WBC has been as low as 2.9 December 2019  · WBC 4    *Neutropenia  · ANC 1590 on 3/23/2021 (previously ANC has been as low as 1480 with WBC in the low normal range)  · ANC 1690    *Thrombocytopenia  · New issue on 3/23/2021, , based on recent labs, but PLT has been as low as 119 October 2019  ·     *He had a white blood cell count in the upper 3s and a hemoglobin in the upper 12s with a normal platelet count prior to beginning chemotherapy.     PLAN:  · MD CBC stat ferritin, iron panel, reticulate hemoglobin 3 months  · Baseline Hb when not in the hospital mostly 9.5-11.5  · Continue Eliquis  · On 7/6/2021, he stated he had been forgetting his morning dose.  He insists he will do better about taking twice per day dosing    · hemorrhoids have been repaired.  Last drop of Hb was down to 7.1 on 12/21/2020.  (Hemorrhoidal bleeding)    Prior plan was:  · MD every 6-month.  · No more surveillance CT scans.  · He does not have a port.    I have discussed with him if Hb drops and remains around 9 or so, we may want to plan a bone marrow biopsy

## 2022-05-31 ENCOUNTER — LAB (OUTPATIENT)
Dept: OTHER | Facility: HOSPITAL | Age: 72
End: 2022-05-31

## 2022-05-31 ENCOUNTER — OFFICE VISIT (OUTPATIENT)
Dept: ONCOLOGY | Facility: CLINIC | Age: 72
End: 2022-05-31

## 2022-05-31 VITALS
OXYGEN SATURATION: 94 % | TEMPERATURE: 97.1 F | RESPIRATION RATE: 18 BRPM | BODY MASS INDEX: 24.52 KG/M2 | HEART RATE: 75 BPM | WEIGHT: 207.7 LBS | SYSTOLIC BLOOD PRESSURE: 145 MMHG | DIASTOLIC BLOOD PRESSURE: 67 MMHG | HEIGHT: 77 IN

## 2022-05-31 DIAGNOSIS — C15.9 ADENOCARCINOMA OF ESOPHAGUS: ICD-10-CM

## 2022-05-31 DIAGNOSIS — D64.9 ANEMIA, UNSPECIFIED TYPE: ICD-10-CM

## 2022-05-31 DIAGNOSIS — E61.1 IRON DEFICIENCY: Primary | ICD-10-CM

## 2022-05-31 DIAGNOSIS — R13.10 DYSPHAGIA, UNSPECIFIED TYPE: ICD-10-CM

## 2022-05-31 LAB
ANION GAP SERPL CALCULATED.3IONS-SCNC: 10 MMOL/L (ref 5–15)
BASOPHILS # BLD AUTO: 0.03 10*3/MM3 (ref 0–0.2)
BASOPHILS NFR BLD AUTO: 0.7 % (ref 0–1.5)
BUN SERPL-MCNC: 22 MG/DL (ref 8–23)
BUN/CREAT SERPL: 14.9 (ref 7–25)
CALCIUM SPEC-SCNC: 9.3 MG/DL (ref 8.6–10.5)
CHLORIDE SERPL-SCNC: 103 MMOL/L (ref 98–107)
CO2 SERPL-SCNC: 24 MMOL/L (ref 22–29)
CREAT SERPL-MCNC: 1.48 MG/DL (ref 0.76–1.27)
DEPRECATED RDW RBC AUTO: 45.5 FL (ref 37–54)
EGFRCR SERPLBLD CKD-EPI 2021: 49.3 ML/MIN/1.73
EOSINOPHIL # BLD AUTO: 0.18 10*3/MM3 (ref 0–0.4)
EOSINOPHIL NFR BLD AUTO: 4.5 % (ref 0.3–6.2)
ERYTHROCYTE [DISTWIDTH] IN BLOOD BY AUTOMATED COUNT: 13.4 % (ref 12.3–15.4)
FERRITIN SERPL-MCNC: 472.6 NG/ML (ref 30–400)
GLUCOSE SERPL-MCNC: 96 MG/DL (ref 65–99)
HCT VFR BLD AUTO: 35.5 % (ref 37.5–51)
HGB BLD-MCNC: 11.4 G/DL (ref 13–17.7)
HGB RETIC QN AUTO: 34.5 PG (ref 29.8–36.1)
IMM GRANULOCYTES # BLD AUTO: 0.01 10*3/MM3 (ref 0–0.05)
IMM GRANULOCYTES NFR BLD AUTO: 0.2 % (ref 0–0.5)
IMM RETICS NFR: 10.2 % (ref 3–15.8)
IRON 24H UR-MRATE: 67 MCG/DL (ref 59–158)
IRON SATN MFR SERPL: 23 % (ref 20–50)
LYMPHOCYTES # BLD AUTO: 1.65 10*3/MM3 (ref 0.7–3.1)
LYMPHOCYTES NFR BLD AUTO: 41 % (ref 19.6–45.3)
MCH RBC QN AUTO: 29.5 PG (ref 26.6–33)
MCHC RBC AUTO-ENTMCNC: 32.1 G/DL (ref 31.5–35.7)
MCV RBC AUTO: 91.7 FL (ref 79–97)
MONOCYTES # BLD AUTO: 0.46 10*3/MM3 (ref 0.1–0.9)
MONOCYTES NFR BLD AUTO: 11.4 % (ref 5–12)
NEUTROPHILS NFR BLD AUTO: 1.69 10*3/MM3 (ref 1.7–7)
NEUTROPHILS NFR BLD AUTO: 42.2 % (ref 42.7–76)
NRBC BLD AUTO-RTO: 0 /100 WBC (ref 0–0.2)
PLATELET # BLD AUTO: 158 10*3/MM3 (ref 140–450)
PMV BLD AUTO: 9 FL (ref 6–12)
POTASSIUM SERPL-SCNC: 4.3 MMOL/L (ref 3.5–5.2)
RBC # BLD AUTO: 3.87 10*6/MM3 (ref 4.14–5.8)
RETICS # AUTO: 0.03 10*6/MM3 (ref 0.02–0.13)
RETICS/RBC NFR AUTO: 0.7 % (ref 0.7–1.9)
SODIUM SERPL-SCNC: 137 MMOL/L (ref 136–145)
TIBC SERPL-MCNC: 291 MCG/DL (ref 298–536)
TRANSFERRIN SERPL-MCNC: 195 MG/DL (ref 200–360)
WBC NRBC COR # BLD: 4.02 10*3/MM3 (ref 3.4–10.8)

## 2022-05-31 PROCEDURE — 83540 ASSAY OF IRON: CPT | Performed by: INTERNAL MEDICINE

## 2022-05-31 PROCEDURE — 80048 BASIC METABOLIC PNL TOTAL CA: CPT | Performed by: INTERNAL MEDICINE

## 2022-05-31 PROCEDURE — 84466 ASSAY OF TRANSFERRIN: CPT | Performed by: INTERNAL MEDICINE

## 2022-05-31 PROCEDURE — 36415 COLL VENOUS BLD VENIPUNCTURE: CPT

## 2022-05-31 PROCEDURE — 85046 RETICYTE/HGB CONCENTRATE: CPT | Performed by: INTERNAL MEDICINE

## 2022-05-31 PROCEDURE — 82728 ASSAY OF FERRITIN: CPT | Performed by: INTERNAL MEDICINE

## 2022-05-31 PROCEDURE — 99214 OFFICE O/P EST MOD 30 MIN: CPT | Performed by: INTERNAL MEDICINE

## 2022-05-31 PROCEDURE — 85025 COMPLETE CBC W/AUTO DIFF WBC: CPT | Performed by: INTERNAL MEDICINE

## 2022-05-31 RX ORDER — CYANOCOBALAMIN 1000 UG/ML
INJECTION, SOLUTION INTRAMUSCULAR; SUBCUTANEOUS
COMMUNITY
Start: 2022-05-01

## 2022-06-01 ENCOUNTER — READMISSION MANAGEMENT (OUTPATIENT)
Dept: CALL CENTER | Facility: HOSPITAL | Age: 72
End: 2022-06-01

## 2022-06-01 NOTE — OUTREACH NOTE
AMI Week 3 Survey    Flowsheet Row Responses   [facility] facility patient discharged from? [place]   Does the patient have one of the following disease processes/diagnoses(primary or secondary)? Acute MI (STEMI,NSTEMI)   Week 3 attempt successful? No   Unsuccessful attempts Attempt 1          KANDACE DIAZ - Registered Nurse

## 2022-06-02 ENCOUNTER — OFFICE VISIT (OUTPATIENT)
Dept: CARDIAC REHAB | Facility: HOSPITAL | Age: 72
End: 2022-06-02

## 2022-06-02 ENCOUNTER — TELEPHONE (OUTPATIENT)
Dept: CARDIOLOGY | Facility: CLINIC | Age: 72
End: 2022-06-02

## 2022-06-02 ENCOUNTER — LAB (OUTPATIENT)
Dept: LAB | Facility: HOSPITAL | Age: 72
End: 2022-06-02

## 2022-06-02 DIAGNOSIS — E78.5 HYPERLIPIDEMIA, UNSPECIFIED HYPERLIPIDEMIA TYPE: Primary | ICD-10-CM

## 2022-06-02 DIAGNOSIS — I21.4 NON-STEMI (NON-ST ELEVATED MYOCARDIAL INFARCTION): Primary | ICD-10-CM

## 2022-06-02 LAB
CHOLEST SERPL-MCNC: 169 MG/DL (ref 0–200)
HDLC SERPL-MCNC: 60 MG/DL (ref 40–60)
LDLC SERPL CALC-MCNC: 98 MG/DL (ref 0–100)
LDLC/HDLC SERPL: 1.64 {RATIO}
TRIGL SERPL-MCNC: 53 MG/DL (ref 0–150)
VLDLC SERPL-MCNC: 11 MG/DL (ref 5–40)

## 2022-06-02 PROCEDURE — 36415 COLL VENOUS BLD VENIPUNCTURE: CPT | Performed by: INTERNAL MEDICINE

## 2022-06-02 PROCEDURE — 80061 LIPID PANEL: CPT | Performed by: INTERNAL MEDICINE

## 2022-06-02 PROCEDURE — 93797 PHYS/QHP OP CAR RHAB WO ECG: CPT

## 2022-06-02 NOTE — TELEPHONE ENCOUNTER
Dr. Cyr,    I picked up a call from Cardiac Rehab/Mery Crowell, she is requesting an order for a Lipid Panel. Pt is currently at Cardiac Rehab, and he is fasting today. She said that he hasn't had a lipid panel done in a few years.  Can you please sign the order? Pt will go to the Outpatient Lab today after rehab.    Thank you,    Celina, ISABELA

## 2022-06-06 ENCOUNTER — TREATMENT (OUTPATIENT)
Dept: CARDIAC REHAB | Facility: HOSPITAL | Age: 72
End: 2022-06-06

## 2022-06-06 DIAGNOSIS — I21.4 NON-STEMI (NON-ST ELEVATED MYOCARDIAL INFARCTION): Primary | ICD-10-CM

## 2022-06-06 PROCEDURE — 93798 PHYS/QHP OP CAR RHAB W/ECG: CPT

## 2022-06-07 ENCOUNTER — READMISSION MANAGEMENT (OUTPATIENT)
Dept: CALL CENTER | Facility: HOSPITAL | Age: 72
End: 2022-06-07

## 2022-06-07 NOTE — OUTREACH NOTE
AMI Week 3 Survey    Flowsheet Row Responses   [facility] facility patient discharged from? [place]   Does the patient have one of the following disease processes/diagnoses(primary or secondary)? Acute MI (STEMI,NSTEMI)   Week 3 attempt successful? No   Unsuccessful attempts Attempt 2          KANDACE DIAZ - Registered Nurse

## 2022-06-08 ENCOUNTER — TREATMENT (OUTPATIENT)
Dept: CARDIAC REHAB | Facility: HOSPITAL | Age: 72
End: 2022-06-08

## 2022-06-08 DIAGNOSIS — I21.4 NON-STEMI (NON-ST ELEVATED MYOCARDIAL INFARCTION): Primary | ICD-10-CM

## 2022-06-08 PROCEDURE — 93798 PHYS/QHP OP CAR RHAB W/ECG: CPT

## 2022-06-10 ENCOUNTER — TREATMENT (OUTPATIENT)
Dept: CARDIAC REHAB | Facility: HOSPITAL | Age: 72
End: 2022-06-10

## 2022-06-10 DIAGNOSIS — I21.4 NON-STEMI (NON-ST ELEVATED MYOCARDIAL INFARCTION): Primary | ICD-10-CM

## 2022-06-10 PROCEDURE — 93798 PHYS/QHP OP CAR RHAB W/ECG: CPT

## 2022-06-13 ENCOUNTER — TELEPHONE (OUTPATIENT)
Dept: CARDIAC REHAB | Facility: HOSPITAL | Age: 72
End: 2022-06-13

## 2022-06-13 ENCOUNTER — TREATMENT (OUTPATIENT)
Dept: CARDIAC REHAB | Facility: HOSPITAL | Age: 72
End: 2022-06-13

## 2022-06-13 DIAGNOSIS — I21.4 NON-STEMI (NON-ST ELEVATED MYOCARDIAL INFARCTION): Primary | ICD-10-CM

## 2022-06-13 PROCEDURE — 93798 PHYS/QHP OP CAR RHAB W/ECG: CPT

## 2022-06-13 NOTE — TELEPHONE ENCOUNTER
During cardiac rehab session today couplets were noted as well as freq PVCs and trigeminy.  Pt reports taking all medications and denies any cardiac symptoms.  Weight increased 4lbs.  Faxed session report from 6/13/22 to office.

## 2022-06-15 ENCOUNTER — TREATMENT (OUTPATIENT)
Dept: CARDIAC REHAB | Facility: HOSPITAL | Age: 72
End: 2022-06-15

## 2022-06-15 DIAGNOSIS — I21.4 NON-STEMI (NON-ST ELEVATED MYOCARDIAL INFARCTION): Primary | ICD-10-CM

## 2022-06-15 PROCEDURE — 93798 PHYS/QHP OP CAR RHAB W/ECG: CPT

## 2022-06-17 ENCOUNTER — TREATMENT (OUTPATIENT)
Dept: CARDIAC REHAB | Facility: HOSPITAL | Age: 72
End: 2022-06-17

## 2022-06-17 DIAGNOSIS — I21.4 NON-STEMI (NON-ST ELEVATED MYOCARDIAL INFARCTION): Primary | ICD-10-CM

## 2022-06-17 PROCEDURE — 93798 PHYS/QHP OP CAR RHAB W/ECG: CPT

## 2022-06-20 ENCOUNTER — TREATMENT (OUTPATIENT)
Dept: CARDIAC REHAB | Facility: HOSPITAL | Age: 72
End: 2022-06-20

## 2022-06-20 DIAGNOSIS — I21.4 NON-STEMI (NON-ST ELEVATED MYOCARDIAL INFARCTION): Primary | ICD-10-CM

## 2022-06-20 PROCEDURE — 93798 PHYS/QHP OP CAR RHAB W/ECG: CPT

## 2022-06-22 ENCOUNTER — TREATMENT (OUTPATIENT)
Dept: CARDIAC REHAB | Facility: HOSPITAL | Age: 72
End: 2022-06-22

## 2022-06-22 DIAGNOSIS — I21.4 NON-STEMI (NON-ST ELEVATED MYOCARDIAL INFARCTION): Primary | ICD-10-CM

## 2022-06-22 PROCEDURE — 93798 PHYS/QHP OP CAR RHAB W/ECG: CPT

## 2022-06-23 RX ORDER — POTASSIUM CHLORIDE 750 MG/1
CAPSULE, EXTENDED RELEASE ORAL
Qty: 30 CAPSULE | Refills: 5 | Status: SHIPPED | OUTPATIENT
Start: 2022-06-23 | End: 2022-09-21 | Stop reason: SDUPTHER

## 2022-06-24 ENCOUNTER — TREATMENT (OUTPATIENT)
Dept: CARDIAC REHAB | Facility: HOSPITAL | Age: 72
End: 2022-06-24

## 2022-06-24 DIAGNOSIS — I21.4 NON-STEMI (NON-ST ELEVATED MYOCARDIAL INFARCTION): Primary | ICD-10-CM

## 2022-06-24 PROCEDURE — 93798 PHYS/QHP OP CAR RHAB W/ECG: CPT

## 2022-06-27 ENCOUNTER — TELEPHONE (OUTPATIENT)
Dept: INTERNAL MEDICINE | Age: 72
End: 2022-06-27

## 2022-06-27 ENCOUNTER — TREATMENT (OUTPATIENT)
Dept: CARDIAC REHAB | Facility: HOSPITAL | Age: 72
End: 2022-06-27

## 2022-06-27 DIAGNOSIS — I21.4 NON-STEMI (NON-ST ELEVATED MYOCARDIAL INFARCTION): Primary | ICD-10-CM

## 2022-06-27 PROCEDURE — 93798 PHYS/QHP OP CAR RHAB W/ECG: CPT

## 2022-06-27 NOTE — TELEPHONE ENCOUNTER
Caller: Jose Hurtado    Relationship: Self    Best call back number:[phone]         Who are you requesting to speak with (clinical staff, provider,  specific staff member): TERRY OR DR HEALY    What was the call regarding:     PATIENT IS WANTING TO KNOW IF HE CAN TAKE A ANTIINFLAMMATORY MEDICATION WITHOUT IT HAVING ANY EFFECT ON HIS OTHER MEDICATIONS.    IF SO COULD YOU PRESCRIBE HIM SOMETHING FOR HIS ARTHRITIS IN HIS KNEES?      DENIS [address] AT [facility] [phone] [facility] [phone] FX      Do you require a callback: YES      PLEASE CALL AND ADVISE.

## 2022-06-29 ENCOUNTER — TREATMENT (OUTPATIENT)
Dept: CARDIAC REHAB | Facility: HOSPITAL | Age: 72
End: 2022-06-29

## 2022-06-29 DIAGNOSIS — I21.4 NON-STEMI (NON-ST ELEVATED MYOCARDIAL INFARCTION): Primary | ICD-10-CM

## 2022-06-29 PROCEDURE — 93798 PHYS/QHP OP CAR RHAB W/ECG: CPT

## 2022-07-01 ENCOUNTER — TREATMENT (OUTPATIENT)
Dept: CARDIAC REHAB | Facility: HOSPITAL | Age: 72
End: 2022-07-01

## 2022-07-01 DIAGNOSIS — I21.4 NON-STEMI (NON-ST ELEVATED MYOCARDIAL INFARCTION): Primary | ICD-10-CM

## 2022-07-01 PROCEDURE — 93798 PHYS/QHP OP CAR RHAB W/ECG: CPT

## 2022-07-03 DIAGNOSIS — G25.81 RLS (RESTLESS LEGS SYNDROME): Chronic | ICD-10-CM

## 2022-07-05 RX ORDER — PRAMIPEXOLE DIHYDROCHLORIDE 1.5 MG/1
TABLET ORAL
Qty: 180 TABLET | Refills: 1 | Status: SHIPPED | OUTPATIENT
Start: 2022-07-05 | End: 2023-03-03

## 2022-07-06 ENCOUNTER — TREATMENT (OUTPATIENT)
Dept: CARDIAC REHAB | Facility: HOSPITAL | Age: 72
End: 2022-07-06

## 2022-07-06 DIAGNOSIS — I21.4 NON-STEMI (NON-ST ELEVATED MYOCARDIAL INFARCTION): Primary | ICD-10-CM

## 2022-07-06 PROCEDURE — 93798 PHYS/QHP OP CAR RHAB W/ECG: CPT

## 2022-07-08 ENCOUNTER — TREATMENT (OUTPATIENT)
Dept: CARDIAC REHAB | Facility: HOSPITAL | Age: 72
End: 2022-07-08

## 2022-07-08 DIAGNOSIS — I21.4 NON-STEMI (NON-ST ELEVATED MYOCARDIAL INFARCTION): Primary | ICD-10-CM

## 2022-07-08 PROCEDURE — 93798 PHYS/QHP OP CAR RHAB W/ECG: CPT

## 2022-07-11 ENCOUNTER — TREATMENT (OUTPATIENT)
Dept: CARDIAC REHAB | Facility: HOSPITAL | Age: 72
End: 2022-07-11

## 2022-07-11 DIAGNOSIS — I21.4 NON-STEMI (NON-ST ELEVATED MYOCARDIAL INFARCTION): Primary | ICD-10-CM

## 2022-07-11 PROCEDURE — 93798 PHYS/QHP OP CAR RHAB W/ECG: CPT

## 2022-07-11 PROCEDURE — 93797 PHYS/QHP OP CAR RHAB WO ECG: CPT

## 2022-07-13 ENCOUNTER — TREATMENT (OUTPATIENT)
Dept: CARDIAC REHAB | Facility: HOSPITAL | Age: 72
End: 2022-07-13

## 2022-07-13 DIAGNOSIS — I21.4 NON-STEMI (NON-ST ELEVATED MYOCARDIAL INFARCTION): Primary | ICD-10-CM

## 2022-07-13 PROCEDURE — 93798 PHYS/QHP OP CAR RHAB W/ECG: CPT

## 2022-07-15 ENCOUNTER — OFFICE VISIT (OUTPATIENT)
Dept: INTERNAL MEDICINE | Age: 72
End: 2022-07-15

## 2022-07-15 ENCOUNTER — TREATMENT (OUTPATIENT)
Dept: CARDIAC REHAB | Facility: HOSPITAL | Age: 72
End: 2022-07-15

## 2022-07-15 VITALS
HEIGHT: 77 IN | DIASTOLIC BLOOD PRESSURE: 62 MMHG | SYSTOLIC BLOOD PRESSURE: 134 MMHG | OXYGEN SATURATION: 97 % | TEMPERATURE: 98 F | HEART RATE: 82 BPM | BODY MASS INDEX: 25.03 KG/M2 | WEIGHT: 212 LBS

## 2022-07-15 DIAGNOSIS — Z00.00 MEDICARE ANNUAL WELLNESS VISIT, SUBSEQUENT: Primary | ICD-10-CM

## 2022-07-15 DIAGNOSIS — I21.4 NON-STEMI (NON-ST ELEVATED MYOCARDIAL INFARCTION): Primary | ICD-10-CM

## 2022-07-15 PROCEDURE — 96160 PT-FOCUSED HLTH RISK ASSMT: CPT | Performed by: INTERNAL MEDICINE

## 2022-07-15 PROCEDURE — 1170F FXNL STATUS ASSESSED: CPT | Performed by: INTERNAL MEDICINE

## 2022-07-15 PROCEDURE — G0439 PPPS, SUBSEQ VISIT: HCPCS | Performed by: INTERNAL MEDICINE

## 2022-07-15 PROCEDURE — 93798 PHYS/QHP OP CAR RHAB W/ECG: CPT

## 2022-07-15 PROCEDURE — 1159F MED LIST DOCD IN RCRD: CPT | Performed by: INTERNAL MEDICINE

## 2022-07-15 RX ORDER — FUROSEMIDE 20 MG/1
20 TABLET ORAL DAILY
COMMUNITY
Start: 2022-07-15 | End: 2022-09-21 | Stop reason: SDUPTHER

## 2022-07-15 NOTE — PROGRESS NOTES
I N T E R N A L  M E D I C I N E  J U N O H  K I M,  M D      ENCOUNTER DATE:  07/15/2022    Jose Hurtado / 72 y.o. / male        MEDICARE ANNUAL WELLNESS VISIT       Chief Complaint: Medicare Wellness-subsequent (12/2/20)       Patient's general assessment of his health since a year ago:     - Compared to one year ago, he feels his physical health is slightly worse.    - Compared to one year ago, he feels his mental health is about the same without significant change.      HPI for other active medical problems:             HISTORY       Recent Hospitalizations:    Recent hospitalization?:     If YES, location, date, and diagnoses:     · Location:   · Date:   · Principle Discharge Dx:   · Secondary Dx:       Patient Care Team:    Patient Care Team:  Brandin Bolton MD as PCP - General (Internal Medicine)  Code, George THORPE II, MD as Consulting Physician (Hematology and Oncology)  Jose Inman MD as Consulting Physician (Urology)  Mulugeta Millan MD as Consulting Physician (Gastroenterology)  Jose Christine III, MD as Referring Physician (Thoracic Surgery)  Seth Randhawa MD as Consulting Physician (Ophthalmology)  James Cyr MD as Consulting Physician (Cardiology)      Allergies:  Patient has no known allergies.    Medications:  Current Outpatient Medications on File Prior to Visit   Medication Sig Dispense Refill   • albuterol sulfate  (90 Base) MCG/ACT inhaler Inhale 1 puff Every 4 (Four) Hours As Needed for Wheezing.     • apixaban (ELIQUIS) 5 MG tablet tablet Take 1 tablet by mouth Every 12 (Twelve) Hours. Indications: Atrial Fibrillation 60 tablet 1   • atorvastatin (LIPITOR) 20 MG tablet Take 2 tablets by mouth Daily. 90 tablet 0   • atorvastatin (LIPITOR) 40 MG tablet Take 1 tablet by mouth Every Night. 90 tablet 0   • cyanocobalamin 1000 MCG/ML injection      • Cyanocobalamin 1000 MCG/ML kit Inject 1 mL as directed Every 30 (Thirty) Days. Intramuscularly. 1 kit 11   •  "furosemide (LASIX) 20 MG tablet Take 1 tablet by mouth Daily.     • Gemtesa 75 MG tablet Daily.     • ketoconazole (NIZORAL) 2 % cream      • metoprolol tartrate (LOPRESSOR) 25 MG tablet TAKE ONE TABLET BY MOUTH TWICE A  tablet 3   • pantoprazole (PROTONIX) 40 MG EC tablet TAKE ONE TABLET BY MOUTH TWICE A DAY BEFORE A MEAL 180 tablet 3   • PARoxetine (PAXIL) 40 MG tablet TAKE ONE TABLET BY MOUTH EVERY MORNING 30 tablet 5   • potassium chloride (MICRO-K) 10 MEQ CR capsule TAKE ONE CAPSULE BY MOUTH DAILY 30 capsule 5   • pramipexole (MIRAPEX) 1.5 MG tablet TAKE ONE TABLET BY MOUTH TWICE A  tablet 1   • Syringe 25G X 1\" 3 ML misc Use along with B12 . 1 ml into the appropriate side of the muscle once every 30 days. 50 each 1   • furosemide (LASIX) 20 MG tablet Taking everyday  15 tablet 0     No current facility-administered medications on file prior to visit.        PFSH:     The following portions of the patient's history were reviewed and updated as appropriate: Allergies / Current Medications / Past Medical History / Surgical History / Social History / Family History    Problem List:  Patient Active Problem List   Diagnosis   • History of esophageal cancer   • Adenomatous polyp of colon   • Malignant neoplasm of prostate ([facility])   • RLS (restless legs syndrome)   • History of DVT (deep vein thrombosis)   • Recurrent major depressive disorder, in partial remission ([facility])   • Hypertension   • Anemia   • Insomnia due to other mental disorder   • Peripheral polyneuropathy   • B12 deficiency   • Postsurgical malabsorption   • Lymphedema   • Iron deficiency   • Gastroparesis   • Acute deep vein thrombosis (DVT) of popliteal vein of left lower extremity ([facility])   • Tremor of both hands   • Gastrointestinal hemorrhage   • Acute blood loss anemia   • Pancytopenia ([facility])   • Chronic venous hypertension due to DVT   • COPD (chronic obstructive pulmonary disease) ([facility])   • Bleeding hemorrhoid   • Malabsorption of iron "   • Iron deficiency anemia   • Pleuritic chest pain   • History of esophageal cancer   • Abnormal CT scan   • Generalized weakness   • Chronic anticoagulation   • Urinary tract infection due to extended-spectrum beta lactamase (ESBL) producing Escherichia coli   • CKD (chronic kidney disease) stage 2, GFR 60-89 ml/min   • Dysphagia   • PVC's (premature ventricular contractions)   • NSTEMI (non-ST elevated myocardial infarction) (HCC)   • Bronchitis       Past Medical History:  Past Medical History:   Diagnosis Date   • Acute deep vein thrombosis (DVT) of popliteal vein of left lower extremity (HCC) 03/24/2020   • Anemia    • Anti-phospholipid syndrome (HCC)     On lifelong anticoagulation therapy   • Bladder disorder     LEAKAGE   ON MED  wers pads   • Bleeding hemorrhoids    • Community acquired pneumonia     HISTORY OF IN 2014   • Deep venous thrombosis (HCC) 2006, 2008    Left lower extremity multiple   • Depression    • Esophageal carcinoma (HCC) 12/31/2014    had chemo and radiation prior surgery   • Hemorrhoids    • HH (hiatus hernia)    • History of atrial fibrillation 2015    ONE EPISODE WHILE HOSPITALIZED   • History of kidney stones    • History of nephrolithiasis    • History of pancreatitis     PT STATES MANY YEARS AGO   • History of radiation therapy    • History of transfusion    • Hypertension    • Long-term (current) use of anticoagulants, INR goal 2.0-3.0    • Lymphedema    • Malignant neoplasm of prostate (HCC)    • Other hyperlipidemia 1/30/2018 January 30, 2018 lipid panel risk 12.8%   • Restless legs syndrome    • Sleep apnea     OCCASIONALLY WEARS CPAP   • Squamous carcinoma     on the head       Past Surgical History:  Past Surgical History:   Procedure Laterality Date   • APPENDECTOMY  1950   • BRONCHOSCOPY      (Diagnostic)   • CARDIAC CATHETERIZATION N/A 5/14/2022    Procedure: Left Heart Cath;  Surgeon: Eric So MD;  Location: [facility] INVASIVE LOCATION;  Service:  Cardiology;  Laterality: N/A;   • CARDIAC CATHETERIZATION N/A 5/14/2022    Procedure: Coronary angiography;  Surgeon: Eric So MD;  Location: [facility] CATH INVASIVE LOCATION;  Service: Cardiology;  Laterality: N/A;   • CARDIAC CATHETERIZATION N/A 5/14/2022    Procedure: Left ventriculography;  Surgeon: Eric So MD;  Location: [facility] CATH INVASIVE LOCATION;  Service: Cardiology;  Laterality: N/A;   • CATARACT EXTRACTION Bilateral 2014   • COLONOSCOPY  12/15/2014    Complete / Description: EH, IH, torts, stool, follow-up colonoscopy due in 5 years.   • COLONOSCOPY N/A 6/13/2017    non-thrombosed external hemorrhoids, normal examined ileum, IH   • COLONOSCOPY N/A 2/26/2019    Procedure: COLONOSCOPY with Cold Polypectomy;  Surgeon: Mulugeta Millan MD;  Location: [facility] ENDOSCOPY;  Service: Gastroenterology   • COLONOSCOPY N/A 12/16/2020    Procedure: COLONOSCOPY to cecum into TI;  Surgeon: Mesha Sanchez MD;  Location: [facility] ENDOSCOPY;  Service: Gastroenterology;  Laterality: N/A;  pre: lower GI bleed  post: hemorrhoids    • CYSTOSCOPY W/ LASER LITHOTRIPSY     • ENDOSCOPY N/A 6/13/2017    Procedure: ESOPHAGOGASTRODUODENOSCOPY WITH COLD BIOPSY;  Surgeon: Lake Gonzalez MD;  Location: [facility] ENDOSCOPY;  Service:    • ENDOSCOPY N/A 11/18/2021    Procedure: ESOPHAGOGASTRODUODENOSCOPY WITH  DILATATION WITH FLUOROSCOPY;  Surgeon: Jose Christine III, MD;  Location: [facility] ENDOSCOPY;  Service: Gastroenterology;  Laterality: N/A;  Pre: dysphagia, h/x of adinocarcinoma of esophagus  Post: same   • ESOPHAGECTOMY      April 2015, stage IIB esophageal carcinoma, sub-total resection.   • ESOPHAGECTOMY      Esophagectomy Subtotal Andrea Joe Procedure   • EXCISION LESION  08/2012    Removal of Squamous Cell CA on Head   • HAMMER TOE REPAIR  09/2014    Hammertoe Operation (Each Toe), 10/2014   • HAMMER TOE REPAIR Left 10/3/2017    Procedure: Left second third and fourth distal interphalangeal  "joint resection with flexor tenotomy;  Surgeon: Mulugeta Lira MD;  Location: [facility]  Service:    • HEMORRHOIDECTOMY N/A 2020    Procedure: HEMORRHOIDECTOMY;  Surgeon: Billy Julio Jr., MD;  Location: [facility];  Service: General;  Laterality: N/A;   • HERNIA REPAIR      incisional   • JEJUNOSTOMY      Laparoscopic   • JEJUNOSTOMY      tube removal    • KNEE SURGERY Bilateral , ,    • PATELLA SURGERY Left     removed   • PILONIDAL CYST / SINUS EXCISION     • NC TOTAL KNEE ARTHROPLASTY Right 3/26/2018    Procedure: TOTAL KNEE ARTHROPLASTY;  Surgeon: Renny Solis MD;  Location: [facility] OR;  Service: Orthopedics   • PROSTATECTOMY     • PYLOROPLASTY     • SPINAL FUSION  1998    C 5,6   • TONSILLECTOMY     • UPPER GASTROINTESTINAL ENDOSCOPY  12/15/2014    LA Grade D esophagitis, Pardo's, HH, multiple duodenal ulcers   • VENTRAL/INCISIONAL HERNIA REPAIR N/A 2016    Procedure: VENTRAL/INCISIONAL HERNIA REPAIR, open, with mesh, and component separation;  Surgeon: Darren Rivas MD;  Location: [facility];  Service:        Social History:  Social History     Socioeconomic History   • Marital status:      Spouse name: Lena   • Number of children: 0   • Years of education: College   Tobacco Use   • Smoking status: Former Smoker     Packs/day: 2.00     Years: 3.00     Pack years: 6.00     Types: Cigarettes     Quit date:      Years since quittin.5   • Smokeless tobacco: Former User     Types: Chew   • Tobacco comment: no caffeine    Vaping Use   • Vaping Use: Never used   Substance and Sexual Activity   • Alcohol use: Yes     Alcohol/week: 3.0 standard drinks     Types: 1 Glasses of wine, 1 Cans of beer, 1 Shots of liquor per week     Comment: 1-2 drinks/week   • Drug use: Not Currently     Types: Marijuana     Comment: hx marijuana use \"a long time ago\"   • Sexual activity: Defer       Family History:  Family History   Problem " "Relation Age of Onset   • Cerebral aneurysm Mother         cerebral artery aneurysm ( age 56)   • Prostate cancer Brother 68   • Anxiety disorder Father    • Suicide Attempts Father          of suicide   • Cancer Father         bladder   • No Known Problems Brother    • Pancreatic cancer Nephew    • Colon cancer Neg Hx    • Esophageal cancer Neg Hx    • Dementia Neg Hx    • Malig Hyperthermia Neg Hx          PATIENT ASSESSMENT     Vitals:  Vitals:    07/15/22 1035   BP: 134/62   BP Location: Left arm   Pulse: 82   Temp: 98 °F (36.7 °C)   SpO2: 97%   Weight: 96.2 kg (212 lb)   Height: 195.6 cm (77\")       BP Readings from Last 3 Encounters:   07/15/22 134/62   22 145/67   22 132/70     Wt Readings from Last 3 Encounters:   07/15/22 96.2 kg (212 lb)   22 94.2 kg (207 lb 11.2 oz)   22 92.5 kg (204 lb)      Body mass index is 25.14 kg/m².    Blood pressure readings recorded on patient flowsheet:  No flowsheet data found.         Review of Systems:    Review of Systems  Denies chest pain or worsening shortness of breath   Chronic edema of bilateral lower extremities     Physical Exam:    Physical Exam  General: No acute distress  Psych: Normal thought and judgment   Cardiovascular Rate: normal. Rhythm: regular. Heart sounds: normal.   Pulm/Chest: Effort normal, breath sounds normal.   Bilateral lower extremities : wrapped with ACE     Reviewed Data:    Labs:   Lab Results   Component Value Date     2022    K 4.3 2022    CALCIUM 9.3 2022    AST 25 2022    ALT 19 2022    BUN 22 2022    CREATININE 1.48 (H) 2022    CREATININE 1.42 (H) 2022    CREATININE 1.32 (H) 05/15/2022    EGFRIFNONA 62 2021    EGFRIFAFRI 75 2021       Lab Results   Component Value Date    GLU 91 2018    HGBA1C 5.50 2021    HGBA1C 6.20 (H) 2020       Lab Results   Component Value Date    LDL 98 2022    LDL 92 05/10/2018     (H) " 01/30/2018    HDL 60 06/02/2022    TRIG 53 06/02/2022       Lab Results   Component Value Date    TSH 2.710 01/17/2020    FREET4 1.52 01/17/2020          Lab Results   Component Value Date    WBC 4.02 05/31/2022    HGB 11.4 (L) 05/31/2022    HGB 11.6 (L) 05/15/2022    HGB 11.4 (L) 05/14/2022     05/31/2022                 No results found for: PSA    Imaging:          Medical Tests:          Screening for Glaucoma:  Previous screening for glaucoma?: Yes      Hearing Loss Screen:  Finger Rub Hearing Test (right ear): failed and has hearing aid but not using  Finger Rub Hearing Test (left ear): failed and has hearing aid but not using      Urinary Incontinence Screen:  Episodes of urinary incontinence? : No      Depression Screen:  PHQ-2/PHQ-9 Depression Screening 5/31/2022   Retired PHQ-9 Total Score -   Retired Total Score -   Little Interest or Pleasure in Doing Things 0-->not at all   Feeling Down, Depressed or Hopeless 0-->not at all   PHQ-9: Brief Depression Severity Measure Score 0        PHQ-2: 0 (Not depressed)    PHQ-9: 0 (Negative screening for depression)       FUNCTIONAL, FALL RISK, & COGNITIVE SCREENING (Components below):    DATA:    Functional & Cognitive Status 7/15/2022   Do you have difficulty preparing food and eating? No   Do you have difficulty bathing yourself, getting dressed or grooming yourself? No   Do you have difficulty using the toilet? No   Do you have difficulty moving around from place to place? No   Do you have trouble with steps or getting out of a bed or a chair? Yes   Current Diet Well Balanced Diet   Dental Exam Up to date   Eye Exam Up to date   Exercise (times per week) 5 times per week   Current Exercises Include Treadmill;Cardiovascular Workout;Stationary Bicycling/Spin Class   Current Exercise Activities Include -   Do you need help using the phone?  No   Are you deaf or do you have serious difficulty hearing?  Yes   Do you need help with transportation? Yes   Do you  need help shopping? No   Do you need help preparing meals?  No   Do you need help with housework?  No   Do you need help with laundry? No   Do you need help taking your medications? No   Do you need help managing money? No   Do you ever drive or ride in a car without wearing a seat belt? No   Have you felt unusual stress, anger or loneliness in the last month? -   Who do you live with? -   If you need help, do you have trouble finding someone available to you? -   Do you have difficulty concentrating, remembering or making decisions? No         A) Assessment of Functional Ability:  (Assessment of ability to perform ADL's (showering/bathing, using toilet, dressing, feeding self, moving self around) and IADL's (use telephone, shop, prepare food, housekeep, do laundry, transport independently, take medications independently, and handle finances)    Degree of functional impairment: SEVERE (based on assessment noted above)      B) Assessment of Fall Risk:  Fall Risk Assessment was completed, and patient is at high risk for falls.       Need for further evaluation of gait, strength, and balance? : Yes    Timed Up and Go (TUG):   (>= 12 seconds indicates high risk for falling)    Observable abnormalities included: slow tentative pace  uses a cane        C. Assessment of Cognitive Function:    Mini-Cog Test:     1) Registration (3 objects): Yes   2) Number of objects recalled: 3   3) Clock Draw: Passed? : N/A       Further evaluation required? : Will need close follow-up.         COUNSELING       A. Identification of Health Risk Factors:    Risk factors include: cardiovascular risk factors, polypharmacy, increased fall risk, chronic pain, depression / other psychiatric problems and poor hearing      B. Age-Appropriate Screening Schedule:  (Refer to the list below for future screening recommendations based on patient's age, sex and/or medical conditions. Orders for these recommended tests are listed in the plan section. The  patient has been provided with a written plan)    Health Maintenance Topics  Health Maintenance   Topic Date Due   • INFLUENZA VACCINE  10/01/2022   • LIPID PANEL  06/02/2023   • TDAP/TD VACCINES (3 - Td or Tdap) 04/12/2028   • ZOSTER VACCINE  Completed       Health Maintenance Topics Due or Over-Due  There are no preventive care reminders to display for this patient.      C. Advanced Care Planning:    Advance Care Planning   ACP discussion was held with the patient during this visit. Patient has an advance directive in EMR which is still valid.        D. Patient Self-Management and Personalized Health Advice:    He has been provided with personalized counseling/information (including brochures/handouts) about:     -- recommended hearing testing, reducing risk for cardiac/vascular events with pre-existing disease, fall prevention, polypharmacy concerns, side effects of medications and management of chronic pain with focus on minimizing use of narcotic pain medications      He has been recommended for the following preventative services which has been performed today, will be ordered today or ordered/performed on upcoming follow-up visit:     -- NUTRITION counseling provided, CARDIOVASCULAR disease risk reduction counseling performed, FALL RISK assessment / plan of care completed, URINARY incontinence assessment done      E. Miscellaneous Items:    -Aspirin use counseling: Does not need ASA (and currently is not on it)    -Discussed BMI with him. The BMI is in the acceptable range    -Reviewed use of high risk medication in the elderly: YES    -Reviewed for potential of harmful drug interactions in the elderly: YES        WRAP UP       Assessment & Plan:    1) MEDICARE ANNUAL WELLNESS VISIT    2) OTHER MEDICAL CONDITIONS ADDRESSED TODAY:            Problem List Items Addressed This Visit    None     Visit Diagnoses     Medicare annual wellness visit, subsequent    -  Primary                  No orders of the defined  "types were placed in this encounter.      Discussion / Summary:        Medications as of TODAY:              Current Outpatient Medications   Medication Sig Dispense Refill   • albuterol sulfate  (90 Base) MCG/ACT inhaler Inhale 1 puff Every 4 (Four) Hours As Needed for Wheezing.     • apixaban (ELIQUIS) 5 MG tablet tablet Take 1 tablet by mouth Every 12 (Twelve) Hours. Indications: Atrial Fibrillation 60 tablet 1   • atorvastatin (LIPITOR) 20 MG tablet Take 2 tablets by mouth Daily. 90 tablet 0   • atorvastatin (LIPITOR) 40 MG tablet Take 1 tablet by mouth Every Night. 90 tablet 0   • cyanocobalamin 1000 MCG/ML injection      • Cyanocobalamin 1000 MCG/ML kit Inject 1 mL as directed Every 30 (Thirty) Days. Intramuscularly. 1 kit 11   • furosemide (LASIX) 20 MG tablet Take 1 tablet by mouth Daily.     • Gemtesa 75 MG tablet Daily.     • ketoconazole (NIZORAL) 2 % cream      • metoprolol tartrate (LOPRESSOR) 25 MG tablet TAKE ONE TABLET BY MOUTH TWICE A  tablet 3   • pantoprazole (PROTONIX) 40 MG EC tablet TAKE ONE TABLET BY MOUTH TWICE A DAY BEFORE A MEAL 180 tablet 3   • PARoxetine (PAXIL) 40 MG tablet TAKE ONE TABLET BY MOUTH EVERY MORNING 30 tablet 5   • potassium chloride (MICRO-K) 10 MEQ CR capsule TAKE ONE CAPSULE BY MOUTH DAILY 30 capsule 5   • pramipexole (MIRAPEX) 1.5 MG tablet TAKE ONE TABLET BY MOUTH TWICE A  tablet 1   • Syringe 25G X 1\" 3 ML misc Use along with B12 . 1 ml into the appropriate side of the muscle once every 30 days. 50 each 1     No current facility-administered medications for this visit.         FOLLOW-UP:            Return in about 4 months (around 11/15/2022) for Reassess chronic medical problems.                 Future Appointments   Date Time Provider Department Center   7/18/2022  9:00 AM TELEMETRY MONITOR -  [facility]   7/20/2022  9:00 AM TELEMETRY MONITOR -  [facility]  [facility] CAR [facility]   7/22/2022  9:00 AM TELEMETRY MONITOR -  [facility] BH [facility] " CAR T[facility]IE   7/25/2022  9:00 AM TELEMETRY MONITOR -  [facility]  [facility] CAR [facility]   7/27/2022  9:00 AM TELEMETRY MONITOR -  [facility]  [facility] CAR [facility]   7/29/2022  9:00 AM TELEMETRY MONITOR -  [facility]  [facility] CAR [facility]   8/1/2022  9:00 AM TELEMETRY MONITOR -  [facility]  [facility]IE CAR [facility]   8/3/2022  9:00 AM TELEMETRY MONITOR -  [facility]  [facility]IE CAR [facility]   8/5/2022  9:00 AM TELEMETRY MONITOR -  [facility]  [facility]IE CAR [facility]   8/8/2022  9:00 AM TELEMETRY MONITOR -  [facility]  TONIE CAR [facility]   8/10/2022  9:00 AM TELEMETRY MONITOR -  [facility]  [facility]IE CAR [facility]   8/12/2022  9:00 AM TELEMETRY MONITOR -  [facility]  TONIE CAR [facility]   8/15/2022  9:00 AM TELEMETRY MONITOR -  [facility]  TONIE CAR [facility]   8/17/2022  9:00 AM TELEMETRY MONITOR -  [facility]  TONIE CAR [facility]   8/19/2022  9:00 AM TELEMETRY MONITOR -  [facility]  TONIE CAR [facility]   8/22/2022  9:00 AM TELEMETRY MONITOR -  [facility] CARD  TONIE CAR [facility]IE   8/24/2022  9:00 AM TELEMETRY MONITOR -  [facility]ARD  TONIE CAR T[facility]IE   8/26/2022  9:00 AM TELEMETRY MONITOR -  [facility]  TONIE CAR [facility]   8/29/2022  9:00 AM TELEMETRY MONITOR -  [facility]  [facility]IE CAR [facility]   8/30/2022 11:00 AM LAB CHAIR 1 [facility]  [place] [facility]   8/30/2022 11:40 AM George Phelan II, MD [facility] [place]   8/31/2022  9:00 AM TELEMETRY MONITOR -  [facility]  [facility] CAR [facility]   9/21/2022 11:30 AM Tonya Allen APRN [facility] CD [facility]   11/16/2022 11:00 AM Brandin Bolton MD [facility] PC [facility]   3/29/2023 10:00 AM TONIE CT 2 BH TONIE CT [facility]IE   4/3/2023 10:00 AM Katarina Capone MD [facility] TS [facility]ONIE TONIE           After Visit Summary (AVS) including the Personalized Prevention  Plan Services (PPPS) was either printed and given to the patient at check-out today and/or sent to [facility] for review.         *Examiner was wearing KN95 mask and eye protection during the entire duration of the visit. Patient was masked the entire time. Minimum social distance of 6 ft maintained entire visit except if physical contact was necessary as  documented.       Template created by Moose Bolton MD

## 2022-07-18 ENCOUNTER — TREATMENT (OUTPATIENT)
Dept: CARDIAC REHAB | Facility: HOSPITAL | Age: 72
End: 2022-07-18

## 2022-07-18 DIAGNOSIS — I21.4 NON-STEMI (NON-ST ELEVATED MYOCARDIAL INFARCTION): Primary | ICD-10-CM

## 2022-07-18 PROCEDURE — 93798 PHYS/QHP OP CAR RHAB W/ECG: CPT

## 2022-07-20 ENCOUNTER — TREATMENT (OUTPATIENT)
Dept: CARDIAC REHAB | Facility: HOSPITAL | Age: 72
End: 2022-07-20

## 2022-07-20 DIAGNOSIS — I21.4 NON-STEMI (NON-ST ELEVATED MYOCARDIAL INFARCTION): Primary | ICD-10-CM

## 2022-07-20 PROCEDURE — 93798 PHYS/QHP OP CAR RHAB W/ECG: CPT

## 2022-07-22 ENCOUNTER — APPOINTMENT (OUTPATIENT)
Dept: CARDIAC REHAB | Facility: HOSPITAL | Age: 72
End: 2022-07-22

## 2022-07-25 ENCOUNTER — TREATMENT (OUTPATIENT)
Dept: CARDIAC REHAB | Facility: HOSPITAL | Age: 72
End: 2022-07-25

## 2022-07-25 DIAGNOSIS — I21.4 NON-STEMI (NON-ST ELEVATED MYOCARDIAL INFARCTION): Primary | ICD-10-CM

## 2022-07-25 PROCEDURE — 93798 PHYS/QHP OP CAR RHAB W/ECG: CPT

## 2022-07-27 ENCOUNTER — TREATMENT (OUTPATIENT)
Dept: CARDIAC REHAB | Facility: HOSPITAL | Age: 72
End: 2022-07-27

## 2022-07-27 DIAGNOSIS — I21.4 NON-STEMI (NON-ST ELEVATED MYOCARDIAL INFARCTION): Primary | ICD-10-CM

## 2022-07-27 PROCEDURE — 93798 PHYS/QHP OP CAR RHAB W/ECG: CPT

## 2022-07-29 ENCOUNTER — TREATMENT (OUTPATIENT)
Dept: CARDIAC REHAB | Facility: HOSPITAL | Age: 72
End: 2022-07-29

## 2022-07-29 DIAGNOSIS — I21.4 NON-STEMI (NON-ST ELEVATED MYOCARDIAL INFARCTION): Primary | ICD-10-CM

## 2022-07-29 PROCEDURE — 93798 PHYS/QHP OP CAR RHAB W/ECG: CPT

## 2022-08-01 ENCOUNTER — TREATMENT (OUTPATIENT)
Dept: CARDIAC REHAB | Facility: HOSPITAL | Age: 72
End: 2022-08-01

## 2022-08-01 DIAGNOSIS — I21.4 NON-STEMI (NON-ST ELEVATED MYOCARDIAL INFARCTION): Primary | ICD-10-CM

## 2022-08-01 PROCEDURE — 93798 PHYS/QHP OP CAR RHAB W/ECG: CPT

## 2022-08-03 ENCOUNTER — APPOINTMENT (OUTPATIENT)
Dept: CARDIAC REHAB | Facility: HOSPITAL | Age: 72
End: 2022-08-03

## 2022-08-05 ENCOUNTER — TREATMENT (OUTPATIENT)
Dept: CARDIAC REHAB | Facility: HOSPITAL | Age: 72
End: 2022-08-05

## 2022-08-05 DIAGNOSIS — I21.4 NON-STEMI (NON-ST ELEVATED MYOCARDIAL INFARCTION): Primary | ICD-10-CM

## 2022-08-05 PROCEDURE — 93798 PHYS/QHP OP CAR RHAB W/ECG: CPT

## 2022-08-08 ENCOUNTER — TREATMENT (OUTPATIENT)
Dept: CARDIAC REHAB | Facility: HOSPITAL | Age: 72
End: 2022-08-08

## 2022-08-08 DIAGNOSIS — I21.4 NON-STEMI (NON-ST ELEVATED MYOCARDIAL INFARCTION): Primary | ICD-10-CM

## 2022-08-08 PROCEDURE — 93798 PHYS/QHP OP CAR RHAB W/ECG: CPT

## 2022-08-12 ENCOUNTER — APPOINTMENT (OUTPATIENT)
Dept: CARDIAC REHAB | Facility: HOSPITAL | Age: 72
End: 2022-08-12

## 2022-08-15 ENCOUNTER — TREATMENT (OUTPATIENT)
Dept: CARDIAC REHAB | Facility: HOSPITAL | Age: 72
End: 2022-08-15

## 2022-08-15 DIAGNOSIS — I21.4 NON-STEMI (NON-ST ELEVATED MYOCARDIAL INFARCTION): Primary | ICD-10-CM

## 2022-08-15 PROCEDURE — 93798 PHYS/QHP OP CAR RHAB W/ECG: CPT

## 2022-08-17 ENCOUNTER — TREATMENT (OUTPATIENT)
Dept: CARDIAC REHAB | Facility: HOSPITAL | Age: 72
End: 2022-08-17

## 2022-08-17 DIAGNOSIS — I21.4 NON-STEMI (NON-ST ELEVATED MYOCARDIAL INFARCTION): Primary | ICD-10-CM

## 2022-08-17 PROCEDURE — 93798 PHYS/QHP OP CAR RHAB W/ECG: CPT

## 2022-08-19 ENCOUNTER — APPOINTMENT (OUTPATIENT)
Dept: CARDIAC REHAB | Facility: HOSPITAL | Age: 72
End: 2022-08-19

## 2022-08-22 ENCOUNTER — TREATMENT (OUTPATIENT)
Dept: CARDIAC REHAB | Facility: HOSPITAL | Age: 72
End: 2022-08-22

## 2022-08-22 DIAGNOSIS — I21.4 NON-STEMI (NON-ST ELEVATED MYOCARDIAL INFARCTION): Primary | ICD-10-CM

## 2022-08-22 PROCEDURE — 93798 PHYS/QHP OP CAR RHAB W/ECG: CPT

## 2022-08-22 RX ORDER — APIXABAN 5 MG/1
TABLET, FILM COATED ORAL
Qty: 60 TABLET | Refills: 1 | Status: SHIPPED | OUTPATIENT
Start: 2022-08-22 | End: 2022-11-14

## 2022-08-24 ENCOUNTER — APPOINTMENT (OUTPATIENT)
Dept: CARDIAC REHAB | Facility: HOSPITAL | Age: 72
End: 2022-08-24

## 2022-08-26 ENCOUNTER — APPOINTMENT (OUTPATIENT)
Dept: CARDIAC REHAB | Facility: HOSPITAL | Age: 72
End: 2022-08-26

## 2022-08-29 ENCOUNTER — TREATMENT (OUTPATIENT)
Dept: CARDIAC REHAB | Facility: HOSPITAL | Age: 72
End: 2022-08-29

## 2022-08-29 DIAGNOSIS — I21.4 NON-STEMI (NON-ST ELEVATED MYOCARDIAL INFARCTION): Primary | ICD-10-CM

## 2022-08-29 PROCEDURE — 93798 PHYS/QHP OP CAR RHAB W/ECG: CPT

## 2022-08-30 ENCOUNTER — OFFICE VISIT (OUTPATIENT)
Dept: ONCOLOGY | Facility: CLINIC | Age: 72
End: 2022-08-30

## 2022-08-30 ENCOUNTER — LAB (OUTPATIENT)
Dept: OTHER | Facility: HOSPITAL | Age: 72
End: 2022-08-30

## 2022-08-30 ENCOUNTER — APPOINTMENT (OUTPATIENT)
Dept: OTHER | Facility: HOSPITAL | Age: 72
End: 2022-08-30

## 2022-08-30 VITALS
DIASTOLIC BLOOD PRESSURE: 65 MMHG | TEMPERATURE: 97.1 F | WEIGHT: 214 LBS | BODY MASS INDEX: 25.27 KG/M2 | OXYGEN SATURATION: 94 % | HEIGHT: 77 IN | SYSTOLIC BLOOD PRESSURE: 111 MMHG | RESPIRATION RATE: 16 BRPM | HEART RATE: 72 BPM

## 2022-08-30 DIAGNOSIS — E61.1 IRON DEFICIENCY: ICD-10-CM

## 2022-08-30 DIAGNOSIS — Z85.01 HISTORY OF ESOPHAGEAL CANCER: Primary | ICD-10-CM

## 2022-08-30 LAB
BASOPHILS # BLD AUTO: 0.02 10*3/MM3 (ref 0–0.2)
BASOPHILS NFR BLD AUTO: 0.4 % (ref 0–1.5)
DEPRECATED RDW RBC AUTO: 49.1 FL (ref 37–54)
EOSINOPHIL # BLD AUTO: 0.24 10*3/MM3 (ref 0–0.4)
EOSINOPHIL NFR BLD AUTO: 5.2 % (ref 0.3–6.2)
ERYTHROCYTE [DISTWIDTH] IN BLOOD BY AUTOMATED COUNT: 13.9 % (ref 12.3–15.4)
FERRITIN SERPL-MCNC: 308 NG/ML (ref 30–400)
HCT VFR BLD AUTO: 36.7 % (ref 37.5–51)
HGB BLD-MCNC: 11.7 G/DL (ref 13–17.7)
HGB RETIC QN AUTO: 32.1 PG (ref 29.8–36.1)
IMM GRANULOCYTES # BLD AUTO: 0.01 10*3/MM3 (ref 0–0.05)
IMM GRANULOCYTES NFR BLD AUTO: 0.2 % (ref 0–0.5)
IMM RETICS NFR: 12.4 % (ref 3–15.8)
IRON 24H UR-MRATE: 57 MCG/DL (ref 59–158)
IRON SATN MFR SERPL: 17 % (ref 20–50)
LYMPHOCYTES # BLD AUTO: 1.42 10*3/MM3 (ref 0.7–3.1)
LYMPHOCYTES NFR BLD AUTO: 30.7 % (ref 19.6–45.3)
MCH RBC QN AUTO: 30.6 PG (ref 26.6–33)
MCHC RBC AUTO-ENTMCNC: 31.9 G/DL (ref 31.5–35.7)
MCV RBC AUTO: 96.1 FL (ref 79–97)
MONOCYTES # BLD AUTO: 0.38 10*3/MM3 (ref 0.1–0.9)
MONOCYTES NFR BLD AUTO: 8.2 % (ref 5–12)
NEUTROPHILS NFR BLD AUTO: 2.56 10*3/MM3 (ref 1.7–7)
NEUTROPHILS NFR BLD AUTO: 55.3 % (ref 42.7–76)
NRBC BLD AUTO-RTO: 0 /100 WBC (ref 0–0.2)
PLATELET # BLD AUTO: 174 10*3/MM3 (ref 140–450)
PMV BLD AUTO: 9.2 FL (ref 6–12)
RBC # BLD AUTO: 3.82 10*6/MM3 (ref 4.14–5.8)
RETICS # AUTO: 0.03 10*6/MM3 (ref 0.02–0.13)
RETICS/RBC NFR AUTO: 0.86 % (ref 0.7–1.9)
TIBC SERPL-MCNC: 338 MCG/DL (ref 298–536)
TRANSFERRIN SERPL-MCNC: 227 MG/DL (ref 200–360)
WBC NRBC COR # BLD: 4.63 10*3/MM3 (ref 3.4–10.8)

## 2022-08-30 PROCEDURE — 99214 OFFICE O/P EST MOD 30 MIN: CPT | Performed by: INTERNAL MEDICINE

## 2022-08-30 PROCEDURE — 82728 ASSAY OF FERRITIN: CPT | Performed by: INTERNAL MEDICINE

## 2022-08-30 PROCEDURE — 84466 ASSAY OF TRANSFERRIN: CPT | Performed by: INTERNAL MEDICINE

## 2022-08-30 PROCEDURE — 83540 ASSAY OF IRON: CPT | Performed by: INTERNAL MEDICINE

## 2022-08-30 PROCEDURE — 85046 RETICYTE/HGB CONCENTRATE: CPT | Performed by: INTERNAL MEDICINE

## 2022-08-30 PROCEDURE — 85025 COMPLETE CBC W/AUTO DIFF WBC: CPT | Performed by: INTERNAL MEDICINE

## 2022-08-30 PROCEDURE — 36415 COLL VENOUS BLD VENIPUNCTURE: CPT

## 2022-08-30 RX ORDER — CEPHALEXIN 500 MG/1
CAPSULE ORAL
COMMUNITY
Start: 2022-08-11 | End: 2022-09-21

## 2022-08-30 NOTE — PROGRESS NOTES
Subjective .     REASONS FOR FOLLOWUP:  Esophageal cancer, cytopenias     HISTORY OF PRESENT ILLNESS:  The patient is a 72 y.o. year old male  who is here for follow-up with the above-mentioned history.    No new problems.  No chest pain, SOA, bleeding    Past Medical History:   Diagnosis Date   • Acute deep vein thrombosis (DVT) of popliteal vein of left lower extremity (HCC) 03/24/2020   • Anemia    • Anti-phospholipid syndrome (HCC)     On lifelong anticoagulation therapy   • Bladder disorder     LEAKAGE   ON MED  wers pads   • Bleeding hemorrhoids    • Community acquired pneumonia     HISTORY OF IN 2014   • Deep venous thrombosis (HCC) 2006, 2008    Left lower extremity multiple   • Depression    • Esophageal carcinoma (HCC) 12/31/2014    had chemo and radiation prior surgery   • Hemorrhoids    • HH (hiatus hernia)    • History of atrial fibrillation 2015    ONE EPISODE WHILE HOSPITALIZED   • History of kidney stones    • History of nephrolithiasis    • History of pancreatitis     PT STATES MANY YEARS AGO   • History of radiation therapy    • History of transfusion    • Hypertension    • Long-term (current) use of anticoagulants, INR goal 2.0-3.0    • Lymphedema    • Malignant neoplasm of prostate (HCC)    • Other hyperlipidemia 1/30/2018 January 30, 2018 lipid panel risk 12.8%   • Restless legs syndrome    • Sleep apnea     OCCASIONALLY WEARS CPAP   • Squamous carcinoma     on the head     Past Surgical History:   Procedure Laterality Date   • APPENDECTOMY  1950   • BRONCHOSCOPY      (Diagnostic)   • CARDIAC CATHETERIZATION N/A 5/14/2022    Procedure: Left Heart Cath;  Surgeon: Eric So MD;  Location:  [facility] CATH [facility]VASIVE [facility];  Service: Cardiology;  Laterality: N/A;   • CARDIAC CATHETERIZATION N/A 5/14/2022    Procedure: Coronary angiography;  Surgeon: Eric So MD;  Location:  [facility];  Service: Cardiology;  Laterality: N/A;   • CARDIAC CATHETERIZATION  N/A 5/14/2022    Procedure: Left ventriculography;  Surgeon: Eric So MD;  Location: [facility] CATH INVASIVE LOCATION;  Service: Cardiology;  Laterality: N/A;   • CATARACT EXTRACTION Bilateral 2014   • COLONOSCOPY  12/15/2014    Complete / Description: EH, IH, torts, stool, follow-up colonoscopy due in 5 years.   • COLONOSCOPY N/A 6/13/2017    non-thrombosed external hemorrhoids, normal examined ileum, IH   • COLONOSCOPY N/A 2/26/2019    Procedure: COLONOSCOPY with Cold Polypectomy;  Surgeon: Mulugeta Millan MD;  Location: [facility] ENDOSCOPY;  Service: Gastroenterology   • COLONOSCOPY N/A 12/16/2020    Procedure: COLONOSCOPY to cecum into TI;  Surgeon: Mesha Sanchez MD;  Location: [facility] ENDOSCOPY;  Service: Gastroenterology;  Laterality: N/A;  pre: lower GI bleed  post: hemorrhoids    • CYSTOSCOPY W/ LASER LITHOTRIPSY     • ENDOSCOPY N/A 6/13/2017    Procedure: ESOPHAGOGASTRODUODENOSCOPY WITH COLD BIOPSY;  Surgeon: Lake Gonzalez MD;  Location: [facility] ENDOSCOPY;  Service:    • ENDOSCOPY N/A 11/18/2021    Procedure: ESOPHAGOGASTRODUODENOSCOPY WITH  DILATATION WITH FLUOROSCOPY;  Surgeon: Jose Christine III, MD;  Location: [facility] ENDOSCOPY;  Service: Gastroenterology;  Laterality: N/A;  Pre: dysphagia, h/x of adinocarcinoma of esophagus  Post: same   • ESOPHAGECTOMY      April 2015, stage IIB esophageal carcinoma, sub-total resection.   • ESOPHAGECTOMY      Esophagectomy Subtotal [place] Procedure   • EXCISION LESION  08/2012    Removal of Squamous Cell CA on Head   • HAMMER TOE REPAIR  09/2014    Hammertoe Operation (Each Toe), 10/2014   • HAMMER TOE REPAIR Left 10/3/2017    Procedure: Left second third and fourth distal interphalangeal joint resection with flexor tenotomy;  Surgeon: Mulugeta Lira MD;  Location: [facility] OR OSC;  Service:    • HEMORRHOIDECTOMY N/A 12/23/2020    Procedure: HEMORRHOIDECTOMY;  Surgeon: Billy Julio Jr., MD;  Location: [facility] MAIN OR;  Service:  General;  Laterality: N/A;   • HERNIA REPAIR      incisional   • JEJUNOSTOMY      Laparoscopic   • JEJUNOSTOMY      tube removal    • KNEE SURGERY Bilateral 1967, 1973, 1981   • PATELLA SURGERY Left     removed   • PILONIDAL CYST / SINUS EXCISION     • TN TOTAL KNEE ARTHROPLASTY Right 3/26/2018    Procedure: TOTAL KNEE ARTHROPLASTY;  Surgeon: Renny Solis MD;  Location: [facility] OR;  Service: Orthopedics   • PROSTATECTOMY  2010   • PYLOROPLASTY     • SPINAL FUSION  02/1998    C 5,6   • TONSILLECTOMY     • UPPER GASTROINTESTINAL ENDOSCOPY  12/15/2014    LA Grade D esophagitis, Pardo's, HH, multiple duodenal ulcers   • VENTRAL/INCISIONAL HERNIA REPAIR N/A 4/14/2016    Procedure: VENTRAL/INCISIONAL HERNIA REPAIR, open, with mesh, and component separation;  Surgeon: Darren Rivas MD;  Location: [facility] OR;  Service:        HEMATOLOGIC/ONCOLOGIC HISTORY:  (History from previous dates can be found in the separate document.)    MEDICATIONS    Current Outpatient Medications:   •  albuterol sulfate  (90 Base) MCG/ACT inhaler, Inhale 1 puff Every 4 (Four) Hours As Needed for Wheezing., Disp: , Rfl:   •  atorvastatin (LIPITOR) 40 MG tablet, Take 1 tablet by mouth Every Night., Disp: 90 tablet, Rfl: 0  •  cyanocobalamin 1000 MCG/ML injection, , Disp: , Rfl:   •  Cyanocobalamin 1000 MCG/ML kit, Inject 1 mL as directed Every 30 (Thirty) Days. Intramuscularly., Disp: 1 kit, Rfl: 11  •  Eliquis 5 MG tablet tablet, TAKE ONE TABLET BY MOUTH EVERY 12 HOURS, Disp: 60 tablet, Rfl: 1  •  furosemide (LASIX) 20 MG tablet, Take 1 tablet by mouth Daily., Disp: , Rfl:   •  Gemtesa 75 MG tablet, Daily., Disp: , Rfl:   •  ketoconazole (NIZORAL) 2 % cream, , Disp: , Rfl:   •  metoprolol tartrate (LOPRESSOR) 25 MG tablet, TAKE ONE TABLET BY MOUTH TWICE A DAY, Disp: 180 tablet, Rfl: 3  •  pantoprazole (PROTONIX) 40 MG EC tablet, TAKE ONE TABLET BY MOUTH TWICE A DAY BEFORE A MEAL, Disp: 180 tablet, Rfl: 3  •  PARoxetine  "(PAXIL) 40 MG tablet, TAKE ONE TABLET BY MOUTH EVERY MORNING, Disp: 30 tablet, Rfl: 5  •  potassium chloride (MICRO-K) 10 MEQ CR capsule, TAKE ONE CAPSULE BY MOUTH DAILY, Disp: 30 capsule, Rfl: 5  •  pramipexole (MIRAPEX) 1.5 MG tablet, TAKE ONE TABLET BY MOUTH TWICE A DAY, Disp: 180 tablet, Rfl: 1  •  Syringe 25G X 1\" 3 ML misc, Use along with B12 . 1 ml into the appropriate side of the muscle once every 30 days., Disp: 50 each, Rfl: 1  •  atorvastatin (LIPITOR) 20 MG tablet, Take 2 tablets by mouth Daily., Disp: 90 tablet, Rfl: 0  •  cephalexin (KEFLEX) 500 MG capsule, , Disp: , Rfl:     ALLERGIES:   No Known Allergies    SOCIAL HISTORY:       Social History     Socioeconomic History   • Marital status:      Spouse name: Lena   • Number of children: 0   • Years of education: College   Tobacco Use   • Smoking status: Former Smoker     Packs/day: 2.00     Years: 3.00     Pack years: 6.00     Types: Cigarettes     Quit date:      Years since quittin.6   • Smokeless tobacco: Former User     Types: Chew   • Tobacco comment: no caffeine    Vaping Use   • Vaping Use: Never used   Substance and Sexual Activity   • Alcohol use: Yes     Alcohol/week: 3.0 standard drinks     Types: 1 Glasses of wine, 1 Cans of beer, 1 Shots of liquor per week     Comment: 1-2 drinks/week   • Drug use: Not Currently     Types: Marijuana     Comment: hx marijuana use \"a long time ago\"   • Sexual activity: Defer         FAMILY HISTORY:  Family History   Problem Relation Age of Onset   • Cerebral aneurysm Mother         cerebral artery aneurysm ( age 56)   • Prostate cancer Brother 68   • Anxiety disorder Father    • Suicide Attempts Father          of suicide   • Cancer Father         bladder   • No Known Problems Brother    • Pancreatic cancer Nephew    • Colon cancer Neg Hx    • Esophageal cancer Neg Hx    • Dementia Neg Hx    • Malig Hyperthermia Neg Hx        REVIEW OF SYSTEMS:    Review of Systems " "  Constitutional: Negative for activity change.   HENT: Negative for nosebleeds and trouble swallowing.    Respiratory: Negative for shortness of breath and wheezing.    Cardiovascular: Negative for chest pain and palpitations.   Gastrointestinal: Negative for constipation and diarrhea.   Genitourinary: Negative for dysuria and hematuria.   Musculoskeletal: Negative for arthralgias and myalgias.   Neurological: Negative for seizures and syncope.   Hematological: Negative for adenopathy. Does not bruise/bleed easily.   Psychiatric/Behavioral: Negative for confusion.           Objective    Vitals:    08/30/22 1500   BP: 111/65   Pulse: 72   Resp: 16   Temp: 97.1 °F (36.2 °C)   TempSrc: Temporal   SpO2: 94%   Weight: 97.1 kg (214 lb)   Height: 195 cm (76.77\")   PainSc: 0-No pain     Current Status 8/30/2022   ECOG score 0      PHYSICAL EXAM:        CONSTITUTIONAL:  Vital signs reviewed.  No distress, looks comfortable.  EYES:  Conjunctiva and lids unremarkable.  PERRLA  EARS,NOSE,MOUTH,THROAT:  Ears and nose appear unremarkable.  Lips, teeth, gums appear unremarkable.  RESPIRATORY:  Normal respiratory effort.  Lungs clear to auscultation bilaterally.  CARDIOVASCULAR:  Normal S1, S2.  No murmurs rubs or gallops.  Significant bilateral lower extremity edema unchanged.  He has been to the lymphedema clinic for this.  GASTROINTESTINAL: Abdomen appears unremarkable.  Nontender.  No hepatomegaly.  No splenomegaly.  LYMPHATIC:  No cervical, supraclavicular, axillary lymphadenopathy.  SKIN:  Warm.  No rashes.  PSYCHIATRIC:  Normal judgment and insight.  Normal mood and affect.               RECENT LABS:        WBC   Date/Time Value Ref Range Status   08/30/2022 02:55 PM 4.63 3.40 - 10.80 10*3/mm3 Final   04/15/2019 12:31 PM 3.53 3.40 - 10.80 10*3/mm3 Final   04/16/2018 04:25 PM 4.23 (L) 4.5 - 11.0 10*3/uL Final     Hemoglobin   Date/Time Value Ref Range Status   08/30/2022 02:55 PM 11.7 (L) 13.0 - 17.7 g/dL Final   04/16/2018 " 04:25 PM 9.9 (L) 13.5 - 17.5 g/dL Final     Platelets   Date/Time Value Ref Range Status   08/30/2022 02:55  140 - 450 10*3/mm3 Final   04/16/2018 04:25  140 - 440 10*3/uL Final       Assessment/Plan     ASSESSMENT:  History of esophageal cancer  - Ferritin  - Iron Profile  - Retic With IRF & RET-He  - CBC & Differential    Iron deficiency  - Ferritin  - Iron Profile  - Retic With IRF & RET-He  - CBC & Differential      *Esophageal adenocarcinoma. Initially T2N0M0. After neoadjuvant carboplatin/Taxol with radiation, achieved a pathologic CR at resection, 4/24/2015.   · As per NCCN guidelines, plan CAT scans every 6 months x1 year, then every 6 to 9 months on years 2 and years 3. Defer any surveillance EGDs to Dr. Gonzalez if he feels they are appropriate.   · Also as per NCCN guidelines, plan H and P every 3 to 6 months on years 1 and years 2, then every 6 to 12 months' time on years 3 through years 5 and then annually.   · EGD 6/13/17 by Dr. Gonzalez: No evidence of recurrence.  · CT scan 3/2/18 (this completed 3 years of surveillance CT): No evidence of recurrence.  Plan no more surveillance CT.  · EGD 11/18/2021, Dr. Christine: No evidence of recurrent cancer.  Dilated.  No signs of recurrence.  Continues in remission.    *Recurrent DVT, prior to cancer diagnosis.    · Dr. Bolton previously managed his Coumadin.   · Chronic left leg larger than right, since DVT  · Acute left popliteal, gastrocnemius DVT on 3/24/2020 despite INR 3.4.  Therefore, changed to Eliquis.  · On 3/25/2020, unremarkable: Lupus anticoagulant, beta-2 glycoprotein antibodies.  Anticardiolipin antibodies also felt to be unremarkable with IgG and IgM both at 15 (negative is <15.  Indeterminate is 15-20).  No signs of recurrent DVT.    *CT angiogram chest 3/24/2020 (LLE acute DVT found 3/24/2020): Mild increased opacity LLL may represent developing infiltrate versus atelectasis.  Radiologist recommended follow-up.  · CT chest 5/1/2020:  Resolution of groundglass LLL infiltrate from the 3/24/2020 CT.  A few mildly enlarged mediastinal nodes are less prominent than on the 3/18/2020 CT.  No new abnormalities.  Plan no more follow-up CT scans.    *Persistent cough.  He has seen Dr. Maher Sayied for this.    He did not complain of this today.    *Previously complained of emesis occurring 5 hours after eating on average once every month or 2.  No nausea otherwise.  Denies dysphagia or odynophagia.    Unchanged.  Occurring on average once every couple of months.  He did not complain of this today    *Iron deficiency anemia  · Hb mostly around 11  · On 4/15/2019, ferritin 29.7, 13% saturation.  Serum folate normal.  · On oral iron daily through PCP.  · On 8/23/19, ferritin 65, 14%.   · Increased oral iron.   · On 10/15/19, ferritin 72, 10%.  · On 10/25/2019, stopped oral iron since it was not helping.    · Oral iron not effective due to poor absorption  · 2 doses Injectafer.  · On 12/13/2019, ferritin 708, 15% saturation, Hb 10.4.  · Therefore, no IV iron.  Monitor.  · On 5/5/2020, ferritin 346, 15% saturation, Hb 9.9.  Therefore, no need for IV iron at this time.  · On 7/7/2020, Hb up to 11.7.  Iron labs normal.  · Admission 12/15/2020: Hb 6.6.  Ferritin 40, iron saturation 17%.  Discharged on 12/21/2020 with Hb 7.1, which fell from 7.9 on 12/18/2020, from 8.9 in the early morning 12/18/2020.  The drop in Hb was thought to be due to hemorrhoidal bleeding related to Eliquis.  Eliquis was stopped.  Hemorrhoidal repair planned by Dr. Flynn Julio after the holidays.  Was given Venofer 300 mg on 12/17/2020 by Dr. Ross of our practice  · On 12/29/2020, ferritin 176, 13% saturation, Hb 8.4, reticulate hemoglobin low at 25.7.  Suspect he would benefit from some more iron.  Given 1 dose Injectafer.  · On 2/2/2021, ferritin 317, 17% saturation, Hb 10.3.  Therefore, Hb gradually improved since the 1 dose of Injectafer.  · 3/2/2021: Ferritin iron 93, 11%  saturation, Hb 11.9.  1 dose Injectafer.  · 3/23/2021: Ferritin 471, 20% saturation, Hb 10.8  · 7/6/2021: Ferritin 329, 21% saturation, Hb 10.8  · 9/28/2021: Ferritin 230, 20% saturation, Hb 11.4.  No need for IV iron.  · 12/28/2021: Ferritin 337, 6% iron saturation, Hb 10.4.  No IV iron given.  (Although saturation is low, ferritin is 337).  · 3/22/2022: Ferritin 112, 12% saturation, Hb 10.6.  Plan 1 dose Injectafer.  · 5/31/2022: Ferritin 473, 23% saturation, Hb 11.4  · 8/30/2022: Ferritin 308, 17% saturation, Hb 11.7.  No need for Injectafer    *Hemorrhoidal bleeding with Hb down to 6.6, requiring admission, 12/15/2020.  Thought to be related to Eliquis.  · Eliquis was stopped.  · Hemorrhoidal repair by Dr. Flynn Julio, 12/23/2020  · Eliquis subsequently restarted with no more issues with bleeding.  · 1 episode of dark stools yesterday, 9/27/2021.  Told him if he notices more of this he should contact Dr. Sanchez.  · 3/22/2022: Denies any rectal bleeding.  States this has not really been an issue since the hemorrhoidal repair  · 8/30/2022: Denies bleeding    *9/28/2021: Change in stool frequency.  · Was having a bowel movement 3 times per week.  For the past week has been having 3 formed bowel movements per day.  I told him if this persists he should discuss with Dr. Sanchez.    *Source of iron deficiency.  · Last colonoscopy 2/26/2019 by Dr. Millan.  Last EGD 6/13/2017 by Dr. Gonzalez.  · Suspect he has an absorption issue due to previous esophageal surgery.    *B12 deficiency.  · On 6/6/2019, B12 <150  · On B12 injections through PCP.  · B12 on 5/5/2020 normal at 694  · B12 on 2/2/2021, 789    *Anemia for reasons in addition to iron deficiency and B12 deficiency  · Baseline Hb mostly 10.5-11.5.  · On 5/5/2020, unremarkable: RBC folate, iron labs, B12, haptoglobin, TB, LDH, direct Maki.  · Creatinine baseline is 0.9-1.2   · Hb 9.9 on 5/5/2020  · On 7/7/2020, Hb up to 11.7.  Return to prior follow-up plan.  · On  2/2/2021, B12 and RBC folate unremarkable  · 3/23/2021: Hb 10.8 despite normal iron stores.  · 5/25/2021, Hb 10.7 with normal iron labs  · 7/6/2021, Hb 10.8 with normal iron labs  · Hb 11.7, from 11.4    *Leukocytopenia  · New issue on 3/23/2021, WBC 3.38, based on recent labs, but WBC has been as low as 2.9 December 2019  · WBC 4.6, from 4    *Neutropenia  · ANC 1590 on 3/23/2021 (previously ANC has been as low as 1480 with WBC in the low normal range)  · ANC 2560, from 1690    *Thrombocytopenia  · New issue on 3/23/2021, , based on recent labs, but PLT has been as low as 119 October 2019  · , from 158    *He had a white blood cell count in the upper 3s and a hemoglobin in the upper 12s with a normal platelet count prior to beginning chemotherapy.     PLAN:  · MD CBC stat ferritin, iron panel, reticulate hemoglobin 3 months  · Baseline Hb when not in the hospital mostly 9.5-11.5  · Continue Eliquis  · On 7/6/2021, he stated he had been forgetting his morning dose.  He insists he will do better about taking twice per day dosing    · hemorrhoids have been repaired.  Last drop of Hb was down to 7.1 on 12/21/2020.  (Hemorrhoidal bleeding)    Prior plan was:  · MD every 6-month.  · No more surveillance CT scans.  · He does not have a port.    I have discussed with him if Hb drops and remains around 9 or so, we may want to plan a bone marrow biopsy

## 2022-08-31 ENCOUNTER — APPOINTMENT (OUTPATIENT)
Dept: CARDIAC REHAB | Facility: HOSPITAL | Age: 72
End: 2022-08-31

## 2022-09-02 ENCOUNTER — APPOINTMENT (OUTPATIENT)
Dept: CARDIAC REHAB | Facility: HOSPITAL | Age: 72
End: 2022-09-02

## 2022-09-07 ENCOUNTER — TELEPHONE (OUTPATIENT)
Dept: INTERNAL MEDICINE | Age: 72
End: 2022-09-07

## 2022-09-07 ENCOUNTER — TREATMENT (OUTPATIENT)
Dept: CARDIAC REHAB | Facility: HOSPITAL | Age: 72
End: 2022-09-07

## 2022-09-07 DIAGNOSIS — M54.9 BACK PAIN, UNSPECIFIED BACK LOCATION, UNSPECIFIED BACK PAIN LATERALITY, UNSPECIFIED CHRONICITY: ICD-10-CM

## 2022-09-07 DIAGNOSIS — I21.4 NON-STEMI (NON-ST ELEVATED MYOCARDIAL INFARCTION): Primary | ICD-10-CM

## 2022-09-07 DIAGNOSIS — M25.569 CHRONIC KNEE PAIN, UNSPECIFIED LATERALITY: Primary | ICD-10-CM

## 2022-09-07 DIAGNOSIS — G89.29 CHRONIC KNEE PAIN, UNSPECIFIED LATERALITY: Primary | ICD-10-CM

## 2022-09-07 DIAGNOSIS — R26.89 DIFFICULTY BALANCING WHEN STANDING: ICD-10-CM

## 2022-09-07 PROCEDURE — 93798 PHYS/QHP OP CAR RHAB W/ECG: CPT

## 2022-09-07 NOTE — TELEPHONE ENCOUNTER
PT WOULD LIKE A REF FOR PHYSICAL THERAPY. HE WOULD LIKE TO BE ABLE TO STAND UP STRAIGHT. PLEASE CALL WITH ANY QUESTIONS.[phone] HE WOULD LIKE TO GO TO [facility].

## 2022-09-09 ENCOUNTER — TREATMENT (OUTPATIENT)
Dept: CARDIAC REHAB | Facility: HOSPITAL | Age: 72
End: 2022-09-09

## 2022-09-09 DIAGNOSIS — I21.4 NON-STEMI (NON-ST ELEVATED MYOCARDIAL INFARCTION): Primary | ICD-10-CM

## 2022-09-09 PROCEDURE — 93798 PHYS/QHP OP CAR RHAB W/ECG: CPT

## 2022-09-12 ENCOUNTER — APPOINTMENT (OUTPATIENT)
Dept: CARDIAC REHAB | Facility: HOSPITAL | Age: 72
End: 2022-09-12

## 2022-09-14 ENCOUNTER — APPOINTMENT (OUTPATIENT)
Dept: CARDIAC REHAB | Facility: HOSPITAL | Age: 72
End: 2022-09-14

## 2022-09-16 ENCOUNTER — TREATMENT (OUTPATIENT)
Dept: CARDIAC REHAB | Facility: HOSPITAL | Age: 72
End: 2022-09-16

## 2022-09-16 DIAGNOSIS — I21.4 NON-STEMI (NON-ST ELEVATED MYOCARDIAL INFARCTION): Primary | ICD-10-CM

## 2022-09-16 PROCEDURE — 93798 PHYS/QHP OP CAR RHAB W/ECG: CPT

## 2022-09-19 ENCOUNTER — TREATMENT (OUTPATIENT)
Dept: CARDIAC REHAB | Facility: HOSPITAL | Age: 72
End: 2022-09-19

## 2022-09-19 DIAGNOSIS — I21.4 NON-STEMI (NON-ST ELEVATED MYOCARDIAL INFARCTION): Primary | ICD-10-CM

## 2022-09-19 PROCEDURE — 93798 PHYS/QHP OP CAR RHAB W/ECG: CPT

## 2022-09-21 ENCOUNTER — TREATMENT (OUTPATIENT)
Dept: CARDIAC REHAB | Facility: HOSPITAL | Age: 72
End: 2022-09-21

## 2022-09-21 ENCOUNTER — TREATMENT (OUTPATIENT)
Dept: PHYSICAL THERAPY | Facility: CLINIC | Age: 72
End: 2022-09-21

## 2022-09-21 ENCOUNTER — OFFICE VISIT (OUTPATIENT)
Dept: CARDIOLOGY | Facility: CLINIC | Age: 72
End: 2022-09-21

## 2022-09-21 VITALS
HEART RATE: 71 BPM | DIASTOLIC BLOOD PRESSURE: 70 MMHG | SYSTOLIC BLOOD PRESSURE: 138 MMHG | OXYGEN SATURATION: 97 % | BODY MASS INDEX: 27.39 KG/M2 | HEIGHT: 77 IN | WEIGHT: 232 LBS

## 2022-09-21 DIAGNOSIS — M25.362: ICD-10-CM

## 2022-09-21 DIAGNOSIS — M25.661 KNEE JOINT STIFFNESS, BILATERAL: ICD-10-CM

## 2022-09-21 DIAGNOSIS — Z96.651 H/O TOTAL KNEE REPLACEMENT, RIGHT: ICD-10-CM

## 2022-09-21 DIAGNOSIS — I21.4 NON-STEMI (NON-ST ELEVATED MYOCARDIAL INFARCTION): Primary | ICD-10-CM

## 2022-09-21 DIAGNOSIS — R29.898 WEAKNESS OF BOTH HIPS: ICD-10-CM

## 2022-09-21 DIAGNOSIS — I89.0 LYMPHEDEMA: ICD-10-CM

## 2022-09-21 DIAGNOSIS — I49.3 PVC'S (PREMATURE VENTRICULAR CONTRACTIONS): ICD-10-CM

## 2022-09-21 DIAGNOSIS — I10 PRIMARY HYPERTENSION: Primary | Chronic | ICD-10-CM

## 2022-09-21 DIAGNOSIS — I87.099 CHRONIC VENOUS HYPERTENSION DUE TO DVT: ICD-10-CM

## 2022-09-21 DIAGNOSIS — R29.898 WEAKNESS OF BOTH LEGS: Primary | ICD-10-CM

## 2022-09-21 DIAGNOSIS — M25.662 KNEE JOINT STIFFNESS, BILATERAL: ICD-10-CM

## 2022-09-21 PROCEDURE — 99214 OFFICE O/P EST MOD 30 MIN: CPT | Performed by: NURSE PRACTITIONER

## 2022-09-21 PROCEDURE — 93798 PHYS/QHP OP CAR RHAB W/ECG: CPT

## 2022-09-21 PROCEDURE — 97110 THERAPEUTIC EXERCISES: CPT | Performed by: PHYSICAL THERAPIST

## 2022-09-21 PROCEDURE — 93000 ELECTROCARDIOGRAM COMPLETE: CPT | Performed by: NURSE PRACTITIONER

## 2022-09-21 PROCEDURE — 97162 PT EVAL MOD COMPLEX 30 MIN: CPT | Performed by: PHYSICAL THERAPIST

## 2022-09-21 RX ORDER — FUROSEMIDE 20 MG/1
20 TABLET ORAL DAILY
Qty: 90 TABLET | Refills: 3 | Status: SHIPPED | OUTPATIENT
Start: 2022-09-21 | End: 2022-12-16

## 2022-09-21 RX ORDER — POTASSIUM CHLORIDE 750 MG/1
10 CAPSULE, EXTENDED RELEASE ORAL DAILY
Qty: 90 CAPSULE | Refills: 5 | Status: SHIPPED | OUTPATIENT
Start: 2022-09-21

## 2022-09-21 RX ORDER — ATORVASTATIN CALCIUM 40 MG/1
40 TABLET, FILM COATED ORAL NIGHTLY
Qty: 90 TABLET | Refills: 3 | Status: SHIPPED | OUTPATIENT
Start: 2022-09-21

## 2022-09-21 NOTE — PROGRESS NOTES
"   Physical Therapy Initial Evaluation and Plan of Care    Patient: Jose Hurtado   : 1950  Diagnosis/ICD-10 Code:  No primary diagnosis found.   Weakness both LEs R29.898  Referring practitioner: Brandin Bolton MD  Date of Initial Visit: 2022  Today's Date: 2022  Patient seen for 1 session         Visit Diagnoses:  No diagnosis found.      Subjective Questionnaire: LEFS:       Subjective Evaluation    History of Present Illness  Mechanism of injury: 72 year old known to this clinic for prior therapy for balance and LE weakness issues. Last underwent PT here in  and now going to a /PT 2 days a week. Exercise efforts with /PT focusing on back pain and posture. Now wants to supplement this with PT at our clinic. S/p R TKA about 5 years ago and he feels he has some rubbing type pain in his infrapatellar region. Has had a second opinion with ortho recommending he redo his TKA but he feels \"too old\" to do this right now, maybe ever. L incision along knees looks like a TKA but pt states it was a patellectomy done in .   S/p ,  DVTs to L LE. L LE swollen since  and now R LE swollen the past year. Has had lymphedema therapy in the past and usually wraps his legs daily with 2 ace wraps.   Hx fall about a year ago catching his foot on a rug and landed on his side. Luckily no injury.  Also c/o hip pain and seen by Dr. MARINELLI 2022 with injection giving him mild relief. No plans for THAs as \"OA is not bad enough.\"  Troponin levels high the past year and seeing cardiologist frequently. States he is stable and clear to exercise.   Esophageal cancer  and did well after major surgical reattachment.   Admits valgus of L knee and was given a brace by a chiropractor. Does not wear it any longer but interested in a brace if it would help. Will bring in his brace for us to evaluate.         Patient Occupation: Still goes into the law office occasionally Quality of " life: good    Pain  Current pain rating: 3  At best pain ratin  At worst pain ratin  Location: bilateral infrapatella  Quality: sharp  Relieving factors: change in position and rest  Aggravating factors: ambulation, squatting and prolonged positioning  Progression: no change    Social Support  Lives in: one-story house  Lives with: spouse    Diagnostic Tests  X-ray: abnormal    Treatments  Previous treatment: chiropractic, injection treatment, medication and physical therapy  Patient Goals  Patient goals for therapy: increased strength, increased motion, improved balance and decreased pain        Report from  ortho exam.    Chief Complaint: History of total right knee replacement   · Date of Exam: 21   · Procedure Performed: XR KNEE 3 VWS RT   · Performing MD: Chaunecy Parikh MD   · History:   Impression: 4V right  knee(s) were taken in the office today, including   AP, flexion PA, lateral, and sunrise views:   · Total Knee Arthroplasty in place.  Tibial Position - neutral, Femoral   position - neutral. Polyethylene demonstrates symmetric wear.  There is no   evidence of fracture or dislocation.  Patellar height - Normal and   alignment is abnormal: lateral patellar tilt with impingement of lateral   facet on distal femur.  Mild radiolucent lines seen around femur on   lateral view. No other periosteal reactions or medullary lesions are seen.    Mod-severe L knee OA L>M.  Patellectomy noted. Joint space narrowing   complete lateral. Osteophytes noted.       Objective          Static Posture     Knee   Genu valgus.   Knee (Left): Flexed.     Tenderness   Left Knee   Tenderness in the inferior patella.     Right Knee   Tenderness in the inferior patella.     Active Range of Motion   Left Knee   Flexion: 112 degrees   Extension: 15 degrees   Extensor lag: 15 degrees     Right Knee   Flexion: 120 degrees   Extension: 8 degrees   Extensor la degrees     Patellar Mobility     Right Knee Hypomobile  "in the medial, lateral, superior and inferior patellar tendon(s).     Patellar Static Positioning   Right Knee: lateral tilt    Strength/Myotome Testing     Left Hip   Planes of Motion   Flexion: 3-  Abduction: 3-    Right Hip   Planes of Motion   Flexion: 3-  Abduction: 3-    Left Knee   Flexion: 3+  Quadriceps contraction: good    Right Knee   Flexion: 3+  Quadriceps contraction: good    Left Ankle/Foot   Dorsiflexion: 4  Plantar flexion: 3-  Inversion: 4-  Eversion: 4-    Right Ankle/Foot   Dorsiflexion: 4  Plantar flexion: 3-  Inversion: 4-  Eversion: 4-    Tests     Left Knee   Positive valgus stress test at 0 degrees and valgus stress test at 30 degrees.     Swelling   Left Ankle/Foot   Malleoli: 39 cm    Right Ankle/Foot   Malleoli: 33 cm    Ambulation   Weight-Bearing Status   Weight-Bearing Status (Left): full weight bearing   Weight-Bearing Status (Right): full weight-bearing    Assistive device used: single point cane    Ambulation: Level Surfaces   Ambulation with assistive device: independent  Ambulation without assistive device: independent    Ambulation: Stairs   Ascend stairs: independent  Pattern: non-reciprocal  Railings: one rail  Descend stairs: independent  Pattern: non-reciprocal  Railings: one rail    Observational Gait   Gait: antalgic and asymmetric   Decreased walking speed, stride length and left stance time.   Left foot contact pattern: foot flat  Right foot contact pattern: foot flat    Quality of Movement During Gait   Trunk  Forward lean.     Knee    Knee (Left): Positive increased flexion during stance and valgus.   Knee (Right): Positive increased flexion during stance.     Additional Quality of Movement During Gait Details  Could use further eval of ankles in barefeet to address possible pronation affecting valgus L knee    Functional Assessment     Forward Step Up 6\"   Left Leg  Pain, increased forward trunk lean, valgus and increased contralateral push off.     Right Leg  Pain, " increased forward trunk lean and increased contralateral push off.      General Comments     Ankle/Foot Comments   No pitting today but swelling is extensive from ankles to knees, L > R.          Assessment & Plan     Assessment  Impairments: abnormal gait, abnormal or restricted ROM, activity intolerance, impaired balance, impaired physical strength, lacks appropriate home exercise program, pain with function, safety issue and weight-bearing intolerance  Functional Limitations: carrying objects, lifting, walking, pulling, pushing, uncomfortable because of pain, moving in bed and standing  Assessment details: Pt with stable condition states he is not getting worse and has not fallen since tripping on a rug last year. Hypomobile patella with infrapatellar pain correlates with x-rays from 2021. Advanced OA left knee with valgus instability creating more stress to R knee. Pt with cardiac issue the past year and feels that he is not strong or healthy enough to undergo any major surgery soon. This co-morbidity puts more stress on exercise therapy to try and improve his gait and quality of life.  Also with severe lymphedema both LEs and the weight of his legs contribute to strain on his back, hips, and cardiopulmonary endurance.   Personal factor of being highly motivated to improve his condition and agrees that water therapy at his gym would be a good way to ambulate with less joint stress. Will encourage this again at future sessions.    Significant forward lean to gait and has been doing 200# on a back extension machine at his gym to address this.   Skilled therapy needed to help patient come up with quality exercises that are safe.     Barriers to therapy: heaviness of LEs  Prognosis: fair  Prognosis details: Access Code: [email or web address]  URL: [email or web address]  Date: 09/21/2022  Prepared by: Zorre Kimura    Exercises  Supine Bridge - 1 x daily - 7 x weekly - 10 reps - 5sec hold  Supine Lower Trunk Rotation - 1 x  daily - 7 x weekly - 10 reps - 5sec hold  Supine Gluteal Sets - 1 x daily - 7 x weekly - 10 reps - 5s hold  Supine Knee Extension Strengthening - 1 x daily - 7 x weekly - 2 sets - 10 reps - 5sec hold  Supine Hip Abduction - 1 x daily - 7 x weekly - 2 sets - 10 reps  Seated Knee Extension Stretch with Chair - 1 x daily - 7 x weekly - 3m hold  Quad Setting and Stretching - 1 x daily - 7 x weekly - 3m hold      Goals  Plan Goals: STGs 4 weeks:  1. Pt to perform gym and HEP with no increase in strain to groin or lower back  2. Pt to start utilizing the pool at his gym for endurance walking and exercise  3. PT to assess pt's knee brace and help with possible purchase of MCL type brace to slow down valgus instability   4. ROM L knee to improve from 0- to 0-  5. Pt to state decreased pain levels both knees by 50% improvement  LTGs 12 weeks:  1. LEFS score to improve from 29/80 to > 45/80  2. Pt to be able to manage steps reciprocally with one railing.   3. Pt to state increased confidence in walking community distances  4. Patellar pain R knee improved by 75%   5. Brace L knee for support as needed to prevent progression of OA    Plan  Therapy options: will be seen for skilled therapy services  Planned modality interventions: electrical stimulation/Russian stimulation  Planned therapy interventions: manual therapy, neuromuscular re-education, soft tissue mobilization, strengthening, stretching, therapeutic activities, gait training, functional ROM exercises, home exercise program and joint mobilization  Frequency: 2x week  Duration in weeks: 11  Treatment plan discussed with: patient        History # of Personal Factors and/or Comorbidities: HIGH (3+)  Examination of Body System(s): # of elements: MODERATE (3)  Clinical Presentation: STABLE   Clinical Decision Making: MODERATE      Timed:         Manual Therapy:         mins  79363;     Therapeutic Exercise:    30     mins  93697;     Neuromuscular Wil:         mins  50356;    Therapeutic Activity:          mins  99456;     Gait Training:           mins  43013;     Ultrasound:          mins  67810;    Ionto                                   mins   99719  Canalith Repos         mins 96002      Un-Timed:  Electrical Stimulation:         mins  30968 ( );  Dry Needling          mins  35929/16722  Traction          mins  10846  Low Eval          Mins  51428  Mod Eval     30     Mins  56242  High Eval                            Mins  31857        Timed Treatment:   30   mins   Total Treatment:     60   mins          PT: Zorre Zeno Kimura, PT, DPT     License Number:  [id number]     [place]:  [id number]    Electronically signed by Zorre Zeno Kimura, PT, 09/21/22, 2:25 PM EDT    Certification Period: 9/21/2022 thru 12/19/2022  I certify that the therapy services are furnished while this patient is under my care.  The services outlined above are required by this patient, and will be reviewed every 90 days.         Physician Signature:__________________________________________________    PHYSICIAN: Brandin Bolton MD      DATE:     Please sign and return via fax to [email or web address] . Thank you, [facility].

## 2022-09-21 NOTE — PROGRESS NOTES
Date of Office Visit: 2022  Encounter Provider: FAYE Cabrales  Place of Service: [facility] CARDIOLOGY  Patient Name: Jose Hurtado  :1950    Chief Complaint   Patient presents with   • Hypertension   :     HPI: Jose Hurtado is a 72 y.o. male who is a patient of Dr. Cyr.  He is new to me today and presents for a 6-month office follow-up.  He has a history of hypertension, hyperlipidemia, chronic PVCs, recurrent DVTs and COPD.  He also has a history of esophageal cancer with prior gastrectomy with gastric pull through in  and chemo/radiation, now in remission.  He has had mild renal insufficiency in the past.     He was in the hospital in May of this year with chronic cough, nausea and retching.  He had chest tightness and shortness of breath that persisted for a few hours, then resolved.  At that time, troponin were elevated in the 0.08-0.1 range.  He underwent left heart cath and was found to have normal coronaries.  A 2D echocardiogram showed normal left ventricular systolic and diastolic function, normal RV size and systolic function, severely dilated LA and no valvular disease.   He also has a chronic left pleural effusion which was small at that time.  EKG showed normal sinus rhythm without any ischemia.    Patient presents today with no complaints of chest pain, dizziness or palpitations.  EKG shows ventricular bigeminy.  This is also represented on EKG strips from cardiac rehab.  Patient is graduating from phase 2 cardiac rehab on Friday.  He has been increasing his exercise tolerance.  He has some balance issues and walks on a cane.  Patient has chronic lymphedema due to his chronic DVTs.  His blood pressure is well controlled.  EKG shows no areas of ischemia.  Lipid panel 3 months ago was in target range.  Patient notes that his diet is not very good.  He states that he eats what he wants to and that consist of soft drinks and sweets, not  many vegetables, and fortunately, he does not eat a lot of red meat.    Previous testing and notes have been reviewed by me.   Past Medical History:   Diagnosis Date   • Acute deep vein thrombosis (DVT) of popliteal vein of left lower extremity (HCC) 03/24/2020   • Anemia    • Anti-phospholipid syndrome (HCC)     On lifelong anticoagulation therapy   • Bladder disorder     LEAKAGE   ON MED  wers pads   • Bleeding hemorrhoids    • Community acquired pneumonia     HISTORY OF IN 2014   • Deep venous thrombosis (HCC) 2006, 2008    Left lower extremity multiple   • Depression    • Esophageal carcinoma (HCC) 12/31/2014    had chemo and radiation prior surgery   • Hemorrhoids    • HH (hiatus hernia)    • History of atrial fibrillation 2015    ONE EPISODE WHILE HOSPITALIZED   • History of kidney stones    • History of nephrolithiasis    • History of pancreatitis     PT STATES MANY YEARS AGO   • History of radiation therapy    • History of transfusion    • Hypertension    • Long-term (current) use of anticoagulants, INR goal 2.0-3.0    • Lymphedema    • Malignant neoplasm of prostate (HCC)    • Other hyperlipidemia 1/30/2018 January 30, 2018 lipid panel risk 12.8%   • Restless legs syndrome    • Sleep apnea     OCCASIONALLY WEARS CPAP   • Squamous carcinoma     on the head       Past Surgical History:   Procedure Laterality Date   • APPENDECTOMY  1950   • BRONCHOSCOPY      (Diagnostic)   • CARDIAC CATHETERIZATION N/A 5/14/2022    Procedure: Left Heart Cath;  Surgeon: Eric So MD;  Location: [facility] CATH INVASIVE LOCATION;  Service: Cardiology;  Laterality: N/A;   • CARDIAC CATHETERIZATION N/A 5/14/2022    Procedure: Coronary angiography;  Surgeon: Eric So MD;  Location: [facility] CATH INVASIVE LOCATION;  Service: Cardiology;  Laterality: N/A;   • CARDIAC CATHETERIZATION N/A 5/14/2022    Procedure: Left ventriculography;  Surgeon: Eric So MD;  Location: [facility] CATH INVASIVE LOCATION;   Service: Cardiology;  Laterality: N/A;   • CATARACT EXTRACTION Bilateral 2014   • COLONOSCOPY  12/15/2014    Complete / Description: EH, IH, torts, stool, follow-up colonoscopy due in 5 years.   • COLONOSCOPY N/A 6/13/2017    non-thrombosed external hemorrhoids, normal examined ileum, IH   • COLONOSCOPY N/A 2/26/2019    Procedure: COLONOSCOPY with Cold Polypectomy;  Surgeon: Mulugeta Millan MD;  Location: [facility] ENDOSCOPY;  Service: Gastroenterology   • COLONOSCOPY N/A 12/16/2020    Procedure: COLONOSCOPY to cecum into TI;  Surgeon: Mesha Sanchez MD;  Location: [facility] ENDOSCOPY;  Service: Gastroenterology;  Laterality: N/A;  pre: lower GI bleed  post: hemorrhoids    • CYSTOSCOPY W/ LASER LITHOTRIPSY     • ENDOSCOPY N/A 6/13/2017    Procedure: ESOPHAGOGASTRODUODENOSCOPY WITH COLD BIOPSY;  Surgeon: Lake Gonzalez MD;  Location: [facility] ENDOSCOPY;  Service:    • ENDOSCOPY N/A 11/18/2021    Procedure: ESOPHAGOGASTRODUODENOSCOPY WITH  DILATATION WITH FLUOROSCOPY;  Surgeon: Jose Christine III, MD;  Location: [facility] ENDOSCOPY;  Service: Gastroenterology;  Laterality: N/A;  Pre: dysphagia, h/x of adinocarcinoma of esophagus  Post: same   • ESOPHAGECTOMY      April 2015, stage IIB esophageal carcinoma, sub-total resection.   • ESOPHAGECTOMY      Esophagectomy Subtotal Anrdea Joe Procedure   • EXCISION LESION  08/2012    Removal of Squamous Cell CA on Head   • HAMMER TOE REPAIR  09/2014    Hammertoe Operation (Each Toe), 10/2014   • HAMMER TOE REPAIR Left 10/3/2017    Procedure: Left second third and fourth distal interphalangeal joint resection with flexor tenotomy;  Surgeon: Mulugeta Lira MD;  Location: [facility] OR OSC;  Service:    • HEMORRHOIDECTOMY N/A 12/23/2020    Procedure: HEMORRHOIDECTOMY;  Surgeon: Billy Julio Jr., MD;  Location: [facility] MAIN OR;  Service: General;  Laterality: N/A;   • HERNIA REPAIR      incisional   • JEJUNOSTOMY      Laparoscopic   • JEJUNOSTOMY      tube removal   "  • KNEE SURGERY Bilateral , ,    • PATELLA SURGERY Left     removed   • PILONIDAL CYST / SINUS EXCISION     • LA TOTAL KNEE ARTHROPLASTY Right 3/26/2018    Procedure: TOTAL KNEE ARTHROPLASTY;  Surgeon: Renny Solis MD;  Location: [facility];  Service: Orthopedics   • PROSTATECTOMY     • PYLOROPLASTY     • SPINAL FUSION  1998    C 5,6   • TONSILLECTOMY     • UPPER GASTROINTESTINAL ENDOSCOPY  12/15/2014    LA Grade D esophagitis, Pardo's, HH, multiple duodenal ulcers   • VENTRAL/INCISIONAL HERNIA REPAIR N/A 2016    Procedure: VENTRAL/INCISIONAL HERNIA REPAIR, open, with mesh, and component separation;  Surgeon: Darren Rivas MD;  Location: [facility] OR;  Service:        Social History     Socioeconomic History   • Marital status:      Spouse name: Lena   • Number of children: 0   • Years of education: College   Tobacco Use   • Smoking status: Former Smoker     Packs/day: 2.00     Years: 3.00     Pack years: 6.00     Types: Cigarettes     Quit date:      Years since quittin.7   • Smokeless tobacco: Former User     Types: Chew   • Tobacco comment: no caffeine    Vaping Use   • Vaping Use: Never used   Substance and Sexual Activity   • Alcohol use: Yes     Alcohol/week: 3.0 standard drinks     Types: 1 Glasses of wine, 1 Cans of beer, 1 Shots of liquor per week     Comment: 1-2 drinks/week   • Drug use: Not Currently     Types: Marijuana     Comment: hx marijuana use \"a long time ago\"   • Sexual activity: Defer       Family History   Problem Relation Age of Onset   • Cerebral aneurysm Mother         cerebral artery aneurysm ( age 56)   • Prostate cancer Brother 68   • Anxiety disorder Father    • Suicide Attempts Father          of suicide   • Cancer Father         bladder   • No Known Problems Brother    • Pancreatic cancer Nephew    • Colon cancer Neg Hx    • Esophageal cancer Neg Hx    • Dementia Neg Hx    • Malig Hyperthermia Neg Hx        Review " "of Systems   Constitutional: Negative.   HENT: Negative.    Eyes: Negative.    Cardiovascular: Negative.         Bilateral lymphedema due to chronic DVT   Respiratory: Negative.    Endocrine: Negative.    Hematologic/Lymphatic:        Eliquis 5 mg twice daily   Skin: Negative.    Musculoskeletal: Negative.    Gastrointestinal: Negative.    Genitourinary: Negative.    Neurological: Negative.  Negative for loss of balance.   Psychiatric/Behavioral: Negative.    Allergic/Immunologic: Negative.        No Known Allergies      Current Outpatient Medications:   •  albuterol sulfate  (90 Base) MCG/ACT inhaler, Inhale 1 puff Every 4 (Four) Hours As Needed for Wheezing., Disp: , Rfl:   •  atorvastatin (LIPITOR) 40 MG tablet, Take 1 tablet by mouth Every Night., Disp: 90 tablet, Rfl: 3  •  cyanocobalamin 1000 MCG/ML injection, , Disp: , Rfl:   •  Eliquis 5 MG tablet tablet, TAKE ONE TABLET BY MOUTH EVERY 12 HOURS, Disp: 60 tablet, Rfl: 1  •  furosemide (LASIX) 20 MG tablet, Take 1 tablet by mouth Daily., Disp: 90 tablet, Rfl: 3  •  Gemtesa 75 MG tablet, Daily., Disp: , Rfl:   •  ketoconazole (NIZORAL) 2 % cream, , Disp: , Rfl:   •  metoprolol tartrate (LOPRESSOR) 25 MG tablet, Take 1 tablet by mouth 2 (Two) Times a Day., Disp: 180 tablet, Rfl: 3  •  pantoprazole (PROTONIX) 40 MG EC tablet, TAKE ONE TABLET BY MOUTH TWICE A DAY BEFORE A MEAL, Disp: 180 tablet, Rfl: 3  •  PARoxetine (PAXIL) 40 MG tablet, TAKE ONE TABLET BY MOUTH EVERY MORNING, Disp: 30 tablet, Rfl: 5  •  potassium chloride (MICRO-K) 10 MEQ CR capsule, Take 1 capsule by mouth Daily., Disp: 90 capsule, Rfl: 5  •  pramipexole (MIRAPEX) 1.5 MG tablet, TAKE ONE TABLET BY MOUTH TWICE A DAY, Disp: 180 tablet, Rfl: 1  •  Syringe 25G X 1\" 3 ML misc, Use along with B12 . 1 ml into the appropriate side of the muscle once every 30 days., Disp: 50 each, Rfl: 1      Objective:     Vitals:    09/21/22 1126   BP: 138/70   BP Location: Right arm   Patient Position: Sitting " "  Cuff Size: Large Adult   Pulse: 71   SpO2: 97%   Weight: 105 kg (232 lb)   Height: 195 cm (76.77\")     Body mass index is 27.68 kg/m².     2D Echocardiogram 5/13/2022:   LVEF 56%, normal LV cavity size, mild LVH, normal wall contractility, normal diastolic function  Normal RV size and systolic function  Severely dilated LA, normal RA size  No significant valvular disease  IVC normal size with partial IVC inspiratory collapse of less than 50%    Left heart cath 5/14/2022:  Normal coronaries  LVEF 55 to 60% with normal wall motion    PHYSICAL EXAM:    Constitutional:       Appearance: Healthy appearance. Not in distress.   Neck:      Vascular: No JVR. JVD normal.   Pulmonary:      Effort: Pulmonary effort is normal.      Breath sounds: Normal breath sounds. No wheezing. No rhonchi. No rales.   Chest:      Chest wall: Not tender to palpatation.   Cardiovascular:      PMI at left midclavicular line. Normal rate. Regular rhythm. Normal S1. Normal S2.      Murmurs: There is no murmur.      No gallop. No click. No rub.      Comments: Bilateral lower extremity lymphedema  Pulses:     Intact distal pulses.   Edema:     Peripheral edema absent.   Abdominal:      General: Bowel sounds are normal.      Palpations: Abdomen is soft.      Tenderness: There is no abdominal tenderness.   Musculoskeletal: Normal range of motion.         General: No tenderness. Skin:     General: Skin is warm and dry.   Neurological:      General: No focal deficit present.      Mental Status: Alert and oriented to person, place and time.           ECG 12 Lead    Date/Time: 9/21/2022 12:26 PM  Performed by: Tonya Allen APRN  Authorized by: Tonya Allen APRN   Comparison: compared with previous ECG from 5/13/2022  Rhythm: sinus rhythm  Ectopy: bigeminy  Rate: normal  BPM: 80  Clinical impression comment: quite a bit of artifact in EKG                Assessment:       Diagnosis Plan   1. Primary hypertension     2. PVC's (premature " ventricular contractions)     3. Chronic venous hypertension due to DVT       Orders Placed This Encounter   Procedures   • ECG 12 Lead     This order was created via procedure documentation     Order Specific Question:   Release to patient     Answer:   Routine Release          Plan:       1.  Hypertension: Well-controlled  2.  Chronic DVT/PE: On Eliquis. Has associated lymphedema  3.  Hyperlipidemia: In target range  4.  Chronic PVCs: On metoprolol.  Recommended that he decrease his caffeine intake.  May want to take magnesium supplement as he may not be getting enough magnesium from his current diet.  5.  History of esophageal cancer status post esophagectomy and pull-through    Mr. Hurtado will follow with Dr. Maldonado in 6 months.  He will call sooner for any questions or concerns.         Your medication list          Accurate as of September 21, 2022 12:27 PM. If you have any questions, ask your nurse or doctor.            CHANGE how you take these medications      Instructions Last Dose Given Next Dose Due   atorvastatin 40 MG tablet  Commonly known as: LIPITOR  What changed: Another medication with the same name was removed. Continue taking this medication, and follow the directions you see here.  Changed by: FAYE Cabrales      Take 1 tablet by mouth Every Night.       cyanocobalamin 1000 MCG/ML injection  What changed: Another medication with the same name was removed. Continue taking this medication, and follow the directions you see here.  Changed by: FAYE Cabrales              CONTINUE taking these medications      Instructions Last Dose Given Next Dose Due   albuterol sulfate  (90 Base) MCG/ACT inhaler  Commonly known as: PROVENTIL HFA;VENTOLIN HFA;PROAIR HFA      Inhale 1 puff Every 4 (Four) Hours As Needed for Wheezing.       Eliquis 5 MG tablet tablet  Generic drug: apixaban      TAKE ONE TABLET BY MOUTH EVERY 12 HOURS       furosemide 20 MG tablet  Commonly known as:  "LASIX      Take 1 tablet by mouth Daily.       Gemtesa 75 MG tablet  Generic drug: Vibegron      Daily.       ketoconazole 2 % cream  Commonly known as: NIZORAL           metoprolol tartrate 25 MG tablet  Commonly known as: LOPRESSOR      Take 1 tablet by mouth 2 (Two) Times a Day.       pantoprazole 40 MG EC tablet  Commonly known as: PROTONIX      TAKE ONE TABLET BY MOUTH TWICE A DAY BEFORE A MEAL       PARoxetine 40 MG tablet  Commonly known as: PAXIL      TAKE ONE TABLET BY MOUTH EVERY MORNING       potassium chloride 10 MEQ CR capsule  Commonly known as: MICRO-K      Take 1 capsule by mouth Daily.       pramipexole 1.5 MG tablet  Commonly known as: MIRAPEX      TAKE ONE TABLET BY MOUTH TWICE A DAY       Syringe 25G X 1\" 3 ML misc      Use along with B12 . 1 ml into the appropriate side of the muscle once every 30 days.          STOP taking these medications    cephalexin 500 MG capsule  Commonly known as: KEFLEX  Stopped by: FAYE Cabrales              Where to Get Your Medications      [facility] [address] [place] [address] [address] [facility] [phone] [facility] [phone]   [address] [facility] [address]    Phone: [phone]   · atorvastatin 40 MG tablet  · furosemide 20 MG tablet  · metoprolol tartrate 25 MG tablet  · potassium chloride 10 MEQ CR capsule           As always, it has been a pleasure to participate in your patient's care.      Sincerely,       FAYE Mccallum  "

## 2022-09-23 ENCOUNTER — TREATMENT (OUTPATIENT)
Dept: CARDIAC REHAB | Facility: HOSPITAL | Age: 72
End: 2022-09-23

## 2022-09-23 DIAGNOSIS — I21.4 NON-STEMI (NON-ST ELEVATED MYOCARDIAL INFARCTION): Primary | ICD-10-CM

## 2022-09-23 PROCEDURE — 93798 PHYS/QHP OP CAR RHAB W/ECG: CPT

## 2022-09-26 ENCOUNTER — APPOINTMENT (OUTPATIENT)
Dept: CARDIAC REHAB | Facility: HOSPITAL | Age: 72
End: 2022-09-26

## 2022-09-26 ENCOUNTER — TREATMENT (OUTPATIENT)
Dept: PHYSICAL THERAPY | Facility: CLINIC | Age: 72
End: 2022-09-26

## 2022-09-26 DIAGNOSIS — R29.898 WEAKNESS OF BOTH LEGS: Primary | ICD-10-CM

## 2022-09-26 DIAGNOSIS — M25.662 KNEE JOINT STIFFNESS, BILATERAL: ICD-10-CM

## 2022-09-26 DIAGNOSIS — R29.898 WEAKNESS OF BOTH HIPS: ICD-10-CM

## 2022-09-26 DIAGNOSIS — M25.661 KNEE JOINT STIFFNESS, BILATERAL: ICD-10-CM

## 2022-09-26 PROCEDURE — 97110 THERAPEUTIC EXERCISES: CPT | Performed by: PHYSICAL THERAPIST

## 2022-09-26 PROCEDURE — 97530 THERAPEUTIC ACTIVITIES: CPT | Performed by: PHYSICAL THERAPIST

## 2022-09-26 NOTE — PROGRESS NOTES
"Physical Therapy Daily Treatment Note      Patient: Jose Hurtado   : 1950  Referring practitioner: Brandin Bolton MD  Date of Initial Visit: Type: THERAPY  Noted: 2022  Today's Date: 2022  Patient seen for 2 sessions         Jose Hurtado reports: legs are weak and \" I just don't walk like I use to and my balance is capoot\" states pt.        Subjective     Objective   Ambulates in/out of clinic with straight cane and decreased step/stride length while ambulating with slightly flexed knees and fwd trunk positioning from midline.  Noted bilateral LE's ace wrapped due to lymphedema.    See Exercise, Manual, and Modality Logs for complete treatment.   Exercise rationale/ pain free exercise performance  Anatomy and structure of affected musculature  Posture/Postural awareness with gait  TKE with exercise and gait  Verbal/Tactile cues to ensure correct exercise performance/technique    Added: NuStep x 10 min, BKFO with blue t band      Assessment/Plan Compliant/Cooperative with rehab efforts this session  Subjectively reports no increased symptoms or discomfort with therapeutic exercise performance today.  Able to perform increased exercise/functional activity without increased LE symptoms but does benefit from verbal/tactile cues to ensure proper positioning, technique and performance.          Progress strengthening /stabilization /functional activity as appropriate to include more weightbearing exercise/activity, balance and gait training.            Timed:  Manual Therapy:         mins  02539;  Therapeutic Exercise:    28     mins  75259;     Neuromuscular Wil:        mins  84188;    Therapeutic Activity:     12     mins  83815;     Gait Training:           mins  04490;     Ultrasound:          mins  57657;      Untimed:  Electrical Stimulation:         mins  55063 ( );  Mechanical Traction:         mins  02577;     Timed Treatment: 40     mins   Total Treatment:   40     mins  Rey Sterling, " PTA  Physical Therapist  Assistant  [id number]

## 2022-09-28 ENCOUNTER — APPOINTMENT (OUTPATIENT)
Dept: CARDIAC REHAB | Facility: HOSPITAL | Age: 72
End: 2022-09-28

## 2022-09-30 ENCOUNTER — TREATMENT (OUTPATIENT)
Dept: PHYSICAL THERAPY | Facility: CLINIC | Age: 72
End: 2022-09-30

## 2022-09-30 DIAGNOSIS — M25.662 KNEE JOINT STIFFNESS, BILATERAL: ICD-10-CM

## 2022-09-30 DIAGNOSIS — R29.898 WEAKNESS OF BOTH HIPS: ICD-10-CM

## 2022-09-30 DIAGNOSIS — M25.661 KNEE JOINT STIFFNESS, BILATERAL: ICD-10-CM

## 2022-09-30 DIAGNOSIS — R29.898 WEAKNESS OF BOTH LEGS: Primary | ICD-10-CM

## 2022-09-30 PROCEDURE — 97530 THERAPEUTIC ACTIVITIES: CPT | Performed by: PHYSICAL THERAPIST

## 2022-09-30 PROCEDURE — 97140 MANUAL THERAPY 1/> REGIONS: CPT | Performed by: PHYSICAL THERAPIST

## 2022-09-30 PROCEDURE — 97110 THERAPEUTIC EXERCISES: CPT | Performed by: PHYSICAL THERAPIST

## 2022-10-03 ENCOUNTER — TREATMENT (OUTPATIENT)
Dept: PHYSICAL THERAPY | Facility: CLINIC | Age: 72
End: 2022-10-03

## 2022-10-03 DIAGNOSIS — M25.662 KNEE JOINT STIFFNESS, BILATERAL: ICD-10-CM

## 2022-10-03 DIAGNOSIS — R29.898 WEAKNESS OF BOTH LEGS: Primary | ICD-10-CM

## 2022-10-03 DIAGNOSIS — M25.661 KNEE JOINT STIFFNESS, BILATERAL: ICD-10-CM

## 2022-10-03 PROCEDURE — 97110 THERAPEUTIC EXERCISES: CPT | Performed by: PHYSICAL THERAPIST

## 2022-10-03 PROCEDURE — 97140 MANUAL THERAPY 1/> REGIONS: CPT | Performed by: PHYSICAL THERAPIST

## 2022-10-03 PROCEDURE — 97530 THERAPEUTIC ACTIVITIES: CPT | Performed by: PHYSICAL THERAPIST

## 2022-10-03 NOTE — PROGRESS NOTES
Physical Therapy Daily Treatment Note      Patient: Jose Hurtado   : 1950  Referring practitioner: Brandin Bolton MD  Date of Initial Visit: Type: THERAPY  Noted: 2022  Today's Date: 10/3/2022  Patient seen for 3 sessions         Jose Hurtado reports: knees are stiff and sore today.    Subjective     Objective   -ambulates in/out of clinic with straight cane and increased flexed posture(fwd trunk from midline) as well as increased knee flexion.   Step/stride length decreased Bilaterally.    See Exercise, Manual, and Modality Logs for complete treatment.   Added: calf raises on leg press machine B LE with emphasis on TKE maintenance B LE's, low julieta step overs  Verbal/tactile cues regarding proper posture, exercise technique/performance and positioning.    Assessment/Plan  Subjective complaints of B knee stiffness/soreness increased pre PT but less so post exercise performance and manual interventions.  Able to perform exercises without increased symptoms/discomfort but benefits from verbal/tactile cues to ensure proper posture/positioning and exercise positioning/technique.  Should continue to improve with further rehab intervention/efforts.         Progress strengthening /stabilization /functional activity as symptoms allow.           Timed:  Manual Therapy:  10       mins  16474;  Therapeutic Exercise:    20     mins  62906;     Neuromuscular Wil:  6      mins  46395;    Therapeutic Activity:     10     mins  33766;     Gait Training:           mins  62186;     Ultrasound:          mins  25108;      Untimed:  Electrical Stimulation:         mins  88755 ( );  Mechanical Traction:         mins  17102;     Timed Treatment: 46     mins   Total Treatment:    46    mins  Rey Sterling PTA  Physical Therapist  Assistant  [id number]

## 2022-10-06 DIAGNOSIS — F32.9 MAJOR DEPRESSIVE DISORDER WITH CURRENT ACTIVE EPISODE, UNSPECIFIED DEPRESSION EPISODE SEVERITY, UNSPECIFIED WHETHER RECURRENT: ICD-10-CM

## 2022-10-06 RX ORDER — PAROXETINE HYDROCHLORIDE 40 MG/1
TABLET, FILM COATED ORAL
Qty: 30 TABLET | Refills: 5 | Status: SHIPPED | OUTPATIENT
Start: 2022-10-06

## 2022-10-07 ENCOUNTER — TREATMENT (OUTPATIENT)
Dept: PHYSICAL THERAPY | Facility: CLINIC | Age: 72
End: 2022-10-07

## 2022-10-07 DIAGNOSIS — M25.662 KNEE JOINT STIFFNESS, BILATERAL: ICD-10-CM

## 2022-10-07 DIAGNOSIS — R29.898 WEAKNESS OF BOTH HIPS: ICD-10-CM

## 2022-10-07 DIAGNOSIS — R29.898 WEAKNESS OF BOTH LEGS: Primary | ICD-10-CM

## 2022-10-07 DIAGNOSIS — M25.661 KNEE JOINT STIFFNESS, BILATERAL: ICD-10-CM

## 2022-10-07 PROCEDURE — 97140 MANUAL THERAPY 1/> REGIONS: CPT | Performed by: PHYSICAL THERAPIST

## 2022-10-07 PROCEDURE — 97110 THERAPEUTIC EXERCISES: CPT | Performed by: PHYSICAL THERAPIST

## 2022-10-07 PROCEDURE — 97112 NEUROMUSCULAR REEDUCATION: CPT | Performed by: PHYSICAL THERAPIST

## 2022-10-10 ENCOUNTER — HOSPITAL ENCOUNTER (OUTPATIENT)
Dept: CARDIOLOGY | Facility: HOSPITAL | Age: 72
Discharge: HOME | End: 2022-10-10

## 2022-10-10 ENCOUNTER — OFFICE VISIT (OUTPATIENT)
Dept: INTERNAL MEDICINE | Age: 72
End: 2022-10-10

## 2022-10-10 VITALS
SYSTOLIC BLOOD PRESSURE: 130 MMHG | BODY MASS INDEX: 27.75 KG/M2 | HEIGHT: 77 IN | TEMPERATURE: 96.6 F | DIASTOLIC BLOOD PRESSURE: 90 MMHG | HEART RATE: 68 BPM | WEIGHT: 235 LBS | OXYGEN SATURATION: 100 %

## 2022-10-10 DIAGNOSIS — M79.89 SWELLING OF LEFT LOWER EXTREMITY: Primary | ICD-10-CM

## 2022-10-10 DIAGNOSIS — Z86.718 HISTORY OF DVT (DEEP VEIN THROMBOSIS): ICD-10-CM

## 2022-10-10 DIAGNOSIS — B35.3 TINEA PEDIS OF LEFT FOOT: ICD-10-CM

## 2022-10-10 LAB
BH CV LOW VAS LEFT GASTRONEMIUS VESSEL: 1
BH CV LOW VAS LEFT POPLITEAL SPONT: 1
BH CV LOWER VASCULAR LEFT COMMON FEMORAL AUGMENT: NORMAL
BH CV LOWER VASCULAR LEFT COMMON FEMORAL COMPETENT: NORMAL
BH CV LOWER VASCULAR LEFT COMMON FEMORAL COMPRESS: NORMAL
BH CV LOWER VASCULAR LEFT COMMON FEMORAL PHASIC: NORMAL
BH CV LOWER VASCULAR LEFT COMMON FEMORAL SPONT: NORMAL
BH CV LOWER VASCULAR LEFT DISTAL FEMORAL COMPRESS: NORMAL
BH CV LOWER VASCULAR LEFT GASTRONEMIUS COMPRESS: NORMAL
BH CV LOWER VASCULAR LEFT GASTRONEMIUS THROMBUS: NORMAL
BH CV LOWER VASCULAR LEFT GREATER SAPH AK COMPRESS: NORMAL
BH CV LOWER VASCULAR LEFT GREATER SAPH BK COMPRESS: NORMAL
BH CV LOWER VASCULAR LEFT LESSER SAPH COMPRESS: NORMAL
BH CV LOWER VASCULAR LEFT MID FEMORAL AUGMENT: NORMAL
BH CV LOWER VASCULAR LEFT MID FEMORAL COMPETENT: NORMAL
BH CV LOWER VASCULAR LEFT MID FEMORAL COMPRESS: NORMAL
BH CV LOWER VASCULAR LEFT MID FEMORAL PHASIC: NORMAL
BH CV LOWER VASCULAR LEFT MID FEMORAL SPONT: NORMAL
BH CV LOWER VASCULAR LEFT POPLITEAL AUGMENT: NORMAL
BH CV LOWER VASCULAR LEFT POPLITEAL COMPETENT: NORMAL
BH CV LOWER VASCULAR LEFT POPLITEAL COMPRESS: NORMAL
BH CV LOWER VASCULAR LEFT POPLITEAL PHASIC: NORMAL
BH CV LOWER VASCULAR LEFT POPLITEAL SPONT: NORMAL
BH CV LOWER VASCULAR LEFT POPLITEAL THROMBUS: NORMAL
BH CV LOWER VASCULAR LEFT PROFUNDA FEMORAL COMPRESS: NORMAL
BH CV LOWER VASCULAR LEFT PROXIMAL FEMORAL COMPRESS: NORMAL
BH CV LOWER VASCULAR LEFT SAPHENOFEMORAL JUNCTION COMPRESS: NORMAL
BH CV LOWER VASCULAR RIGHT COMMON FEMORAL AUGMENT: NORMAL
BH CV LOWER VASCULAR RIGHT COMMON FEMORAL COMPETENT: NORMAL
BH CV LOWER VASCULAR RIGHT COMMON FEMORAL COMPRESS: NORMAL
BH CV LOWER VASCULAR RIGHT COMMON FEMORAL PHASIC: NORMAL
BH CV LOWER VASCULAR RIGHT COMMON FEMORAL SPONT: NORMAL
MAXIMAL PREDICTED HEART RATE: 146 BPM
STRESS TARGET HR: 124 BPM

## 2022-10-10 PROCEDURE — 99214 OFFICE O/P EST MOD 30 MIN: CPT

## 2022-10-10 PROCEDURE — 93971 EXTREMITY STUDY: CPT

## 2022-10-10 RX ORDER — SULCONAZOLE NITRATE 10 MG/G
1 CREAM TOPICAL 2 TIMES DAILY
Qty: 56 G | Refills: 0 | Status: SHIPPED | OUTPATIENT
Start: 2022-10-10 | End: 2022-10-20

## 2022-10-10 NOTE — PROGRESS NOTES
"    I N T E R N A L  M E D I C I N E  Yumi Grider, APRN    ENCOUNTER DATE:  10/10/2022    Jose FITCH Hurtado / 72 y.o. / male      CHIEF COMPLAINT / REASON FOR OFFICE VISIT     Nail Problem      ASSESSMENT & PLAN     Diagnoses and all orders for this visit:    1. Swelling of left lower extremity (Primary)  -     Duplex Venous Upper Extremity - Left CAR    2. History of DVT (deep vein thrombosis)  Overview:  In 2003 and 2006, with Coumadin use    Orders:  -     Duplex Venous Upper Extremity - Left CAR    3. Tinea pedis of left foot       SUMMARY/DISCUSSION  • Recommended to visit ER for any chest pain, dyspnea, worsening swelling, warmth, redness or extremity pain.    • Encouraged consistent dose of Eliquis.    • Recommended to keep L foot dry and clean.      Next Appointment with me: Visit date not found    Return for Next scheduled follow up.      VITAL SIGNS     Visit Vitals  /90   Pulse 68   Temp 96.6 °F (35.9 °C)   Ht 195 cm (76.77\")   Wt 107 kg (235 lb)   SpO2 100%   BMI 28.03 kg/m²             Wt Readings from Last 3 Encounters:   10/10/22 107 kg (235 lb)   09/21/22 105 kg (232 lb)   08/30/22 97.1 kg (214 lb)     Body mass index is 28.03 kg/m².        MEDICATIONS AT THE TIME OF OFFICE VISIT     Current Outpatient Medications on File Prior to Visit   Medication Sig Dispense Refill   • albuterol sulfate  (90 Base) MCG/ACT inhaler Inhale 1 puff Every 4 (Four) Hours As Needed for Wheezing.     • atorvastatin (LIPITOR) 40 MG tablet Take 1 tablet by mouth Every Night. 90 tablet 3   • cyanocobalamin 1000 MCG/ML injection      • Eliquis 5 MG tablet tablet TAKE ONE TABLET BY MOUTH EVERY 12 HOURS 60 tablet 1   • furosemide (LASIX) 20 MG tablet Take 1 tablet by mouth Daily. 90 tablet 3   • Gemtesa 75 MG tablet Daily.     • ketoconazole (NIZORAL) 2 % cream      • metoprolol tartrate (LOPRESSOR) 25 MG tablet Take 1 tablet by mouth 2 (Two) Times a Day. 180 tablet 3   • pantoprazole (PROTONIX) 40 MG EC tablet TAKE " "ONE TABLET BY MOUTH TWICE A DAY BEFORE A MEAL 180 tablet 3   • PARoxetine (PAXIL) 40 MG tablet TAKE ONE TABLET BY MOUTH EVERY MORNING 30 tablet 5   • potassium chloride (MICRO-K) 10 MEQ CR capsule Take 1 capsule by mouth Daily. 90 capsule 5   • pramipexole (MIRAPEX) 1.5 MG tablet TAKE ONE TABLET BY MOUTH TWICE A  tablet 1   • Syringe 25G X 1\" 3 ML misc Use along with B12 . 1 ml into the appropriate side of the muscle once every 30 days. 50 each 1     No current facility-administered medications on file prior to visit.        HISTORY OF PRESENT ILLNESS     Here today for worsening swelling of LLE in the setting of personal history of DVTs and chronic lymphedema.  Personal history of recurrent DVTs - 2003, 2006 while taking Coumadin, and most recently, a couple years ago, per pt.  Remains on Eliquis 5 mg BID.  He does report he has a couple of missed doses.  No leg or foot pain, chest pain, dyspnea, erythema, warmth or tenderness.      He is followed by lymph edema clinic.    He also reports L second toe discoloration x 1 month.  He treated at home with an OTC antifungal cream without improvement.        Patient Care Team:  Brandin Bolton MD as PCP - General (Internal Medicine)  Tapan, George THORPE II, MD as Consulting Physician (Hematology and Oncology)  Jose Inman MD as Consulting Physician (Urology)  Mulugeta Millan MD as Consulting Physician (Gastroenterology)  Jose Christine III, MD as Referring Physician (Thoracic Surgery)  Seth Randhawa MD as Consulting Physician (Ophthalmology)  James Cyr MD as Consulting Physician (Cardiology)    REVIEW OF SYSTEMS     Review of Systems   Constitutional: Negative for chills, fever and unexpected weight change.   Respiratory: Negative for cough, chest tightness and shortness of breath.    Cardiovascular: Positive for leg swelling. Negative for chest pain and palpitations.   Skin: Positive for color change and rash.   Neurological: Negative for " dizziness, weakness, light-headedness and headaches.   Psychiatric/Behavioral: The patient is not nervous/anxious.           PHYSICAL EXAMINATION     Physical Exam  Vitals reviewed.   Constitutional:       General: He is not in acute distress.     Appearance: Normal appearance. He is not ill-appearing, toxic-appearing or diaphoretic.   HENT:      Head: Normocephalic and atraumatic.   Cardiovascular:      Rate and Rhythm: Normal rate and regular rhythm.      Pulses: Normal pulses.      Heart sounds: Normal heart sounds.   Pulmonary:      Effort: Pulmonary effort is normal.      Breath sounds: Normal breath sounds.   Musculoskeletal:      Right lower leg: Edema present.      Left lower leg: Edema present.   Skin:     General: Skin is warm and dry.      Coloration: Skin is not pale.      Findings: Rash (L foot: scaly erythemic rash on second toe) present.   Neurological:      Mental Status: He is alert and oriented to person, place, and time. Mental status is at baseline.   Psychiatric:         Mood and Affect: Mood normal.         Behavior: Behavior normal.         Thought Content: Thought content normal.         Judgment: Judgment normal.           REVIEWED DATA     Labs:           Imaging:            Medical Tests:           Summary of old records / correspondence / consultant report:           Request outside records:

## 2022-10-11 ENCOUNTER — TELEPHONE (OUTPATIENT)
Dept: INTERNAL MEDICINE | Age: 72
End: 2022-10-11

## 2022-10-11 DIAGNOSIS — Z86.718 HISTORY OF DVT (DEEP VEIN THROMBOSIS): Primary | ICD-10-CM

## 2022-10-11 DIAGNOSIS — I89.0 LYMPHEDEMA: ICD-10-CM

## 2022-10-11 NOTE — PROGRESS NOTES
Physical Therapy Daily Treatment Note      Patient: Jose Hurtado   : 1950  Referring practitioner: Brandin Bolton MD  Date of Initial Visit: Type: THERAPY  Noted: 2022  Today's Date: 10/7/22  Patient seen for 5 sessions         Jose Hurtado reports: was a little sore after exercises last visit.  [place] stretching was helpful in regards to his movement the remainder of the day.      Requests emphasis on manual stretching today.          Subjective     Objective   -ambulates in/out of clinic with straight cane and noted fwd flexed trunk posture/positioning from midline as well as decreased step/stride length with noted genuvalgum Bilaterally.    See Exercise, Manual, and Modality Logs for complete treatment.   -verbal/tactile cues for trunk posture/positionig as well as increased step/stride length.    Assessment/Plan  Positive response to manual intervention this session in regards to improved bilateral lower extremity flexibility.   Benefits from verbal/tactile cues to ensure proper posture/ trunk positioning with // bar activities as well as tactile cues to ensure correct exercise technique and performance with sidelying hip abd SLR's . Should improve with continued PT intervention.          Progress strengthening /stabilization /functional activity as appropriate.           Timed:  Manual Therapy:  18        mins  70839;  Therapeutic Exercise:  20       mins  91950;     Neuromuscular Wil:   10     mins  16872;    Therapeutic Activity:         mins  83009;     Gait Training:           mins  85977;     Ultrasound:         mins  72295;      Untimed:  Electrical Stimulation:        mins  69984 ( );  Mechanical Traction:        mins  88413;     Timed Treatment:   48   mins   Total Treatment:    48  mins  Rey Sterling PTA  Physical Therapist  Assistant  [id number]  
No

## 2022-10-12 ENCOUNTER — TREATMENT (OUTPATIENT)
Dept: PHYSICAL THERAPY | Facility: CLINIC | Age: 72
End: 2022-10-12

## 2022-10-12 DIAGNOSIS — M25.661 KNEE JOINT STIFFNESS, BILATERAL: ICD-10-CM

## 2022-10-12 DIAGNOSIS — M25.662 KNEE JOINT STIFFNESS, BILATERAL: ICD-10-CM

## 2022-10-12 DIAGNOSIS — R29.898 WEAKNESS OF BOTH LEGS: Primary | ICD-10-CM

## 2022-10-12 DIAGNOSIS — R29.898 WEAKNESS OF BOTH HIPS: ICD-10-CM

## 2022-10-12 PROCEDURE — 97140 MANUAL THERAPY 1/> REGIONS: CPT | Performed by: PHYSICAL THERAPIST

## 2022-10-12 PROCEDURE — 97110 THERAPEUTIC EXERCISES: CPT | Performed by: PHYSICAL THERAPIST

## 2022-10-12 PROCEDURE — 97112 NEUROMUSCULAR REEDUCATION: CPT | Performed by: PHYSICAL THERAPIST

## 2022-10-14 ENCOUNTER — TELEPHONE (OUTPATIENT)
Dept: PHYSICAL THERAPY | Facility: CLINIC | Age: 72
End: 2022-10-14

## 2022-10-17 ENCOUNTER — TREATMENT (OUTPATIENT)
Dept: PHYSICAL THERAPY | Facility: CLINIC | Age: 72
End: 2022-10-17

## 2022-10-17 DIAGNOSIS — M25.661 KNEE JOINT STIFFNESS, BILATERAL: ICD-10-CM

## 2022-10-17 DIAGNOSIS — R29.898 WEAKNESS OF BOTH LEGS: Primary | ICD-10-CM

## 2022-10-17 DIAGNOSIS — M25.662 KNEE JOINT STIFFNESS, BILATERAL: ICD-10-CM

## 2022-10-17 DIAGNOSIS — R29.898 WEAKNESS OF BOTH HIPS: ICD-10-CM

## 2022-10-17 PROCEDURE — 97140 MANUAL THERAPY 1/> REGIONS: CPT | Performed by: PHYSICAL THERAPIST

## 2022-10-17 PROCEDURE — 97530 THERAPEUTIC ACTIVITIES: CPT | Performed by: PHYSICAL THERAPIST

## 2022-10-17 PROCEDURE — 97110 THERAPEUTIC EXERCISES: CPT | Performed by: PHYSICAL THERAPIST

## 2022-10-19 ENCOUNTER — TREATMENT (OUTPATIENT)
Dept: PHYSICAL THERAPY | Facility: CLINIC | Age: 72
End: 2022-10-19

## 2022-10-19 DIAGNOSIS — R29.898 WEAKNESS OF BOTH HIPS: ICD-10-CM

## 2022-10-19 DIAGNOSIS — M25.661 KNEE JOINT STIFFNESS, BILATERAL: ICD-10-CM

## 2022-10-19 DIAGNOSIS — M25.662 KNEE JOINT STIFFNESS, BILATERAL: ICD-10-CM

## 2022-10-19 DIAGNOSIS — R29.898 WEAKNESS OF BOTH LEGS: Primary | ICD-10-CM

## 2022-10-19 PROCEDURE — 97110 THERAPEUTIC EXERCISES: CPT | Performed by: PHYSICAL THERAPIST

## 2022-10-19 PROCEDURE — 97530 THERAPEUTIC ACTIVITIES: CPT | Performed by: PHYSICAL THERAPIST

## 2022-10-19 PROCEDURE — 97140 MANUAL THERAPY 1/> REGIONS: CPT | Performed by: PHYSICAL THERAPIST

## 2022-10-20 DIAGNOSIS — B35.3 TINEA PEDIS OF LEFT FOOT: Primary | ICD-10-CM

## 2022-10-24 ENCOUNTER — TREATMENT (OUTPATIENT)
Dept: PHYSICAL THERAPY | Facility: CLINIC | Age: 72
End: 2022-10-24

## 2022-10-24 DIAGNOSIS — M25.662 KNEE JOINT STIFFNESS, BILATERAL: ICD-10-CM

## 2022-10-24 DIAGNOSIS — R29.898 WEAKNESS OF BOTH HIPS: ICD-10-CM

## 2022-10-24 DIAGNOSIS — R29.898 WEAKNESS OF BOTH LEGS: Primary | ICD-10-CM

## 2022-10-24 DIAGNOSIS — M25.661 KNEE JOINT STIFFNESS, BILATERAL: ICD-10-CM

## 2022-10-24 PROCEDURE — 97140 MANUAL THERAPY 1/> REGIONS: CPT | Performed by: PHYSICAL THERAPIST

## 2022-10-24 PROCEDURE — 97530 THERAPEUTIC ACTIVITIES: CPT | Performed by: PHYSICAL THERAPIST

## 2022-10-24 PROCEDURE — 97112 NEUROMUSCULAR REEDUCATION: CPT | Performed by: PHYSICAL THERAPIST

## 2022-10-24 PROCEDURE — 97110 THERAPEUTIC EXERCISES: CPT | Performed by: PHYSICAL THERAPIST

## 2022-10-24 NOTE — PROGRESS NOTES
"Physical Therapy Daily Treatment Note      Patient: Jose Hurtado   : 1950  Referring practitioner: Brandin Bolton MD  Date of Initial Visit: Type: THERAPY  Noted: 2022  Today's Date: 10/24/2022  Patient seen for 8 sessions         Jose Hurtado reports: feels like legs are \"loosening\" up with the stretches.  Planning to see orthopedic MD(Dr. Ramachandran) in early Nov regarding knees(pt inquiring regarding possibility of bilateral knee replacements due to age of L knee and persistent issues R knee).    Inquiry by pt regarding use of back brace for posture.        Subjective     Objective   Ambulates in/out of clinic with straight cane.  Noted improved R LE positioning with ambulation vs prior valgus positioning.  L LE still presents in valgus positioning with ambulation.  Pt able to correct with verbal cueing for short period of time/distance.      See Exercise, Manual, and Modality Logs for complete treatment.     -discussed purpose of back brace//postural braces in response to pt inquiry as well as pros and cons of brace wear.          Assessment/Plan  Continues to benefit from manual efforts in regards to affecting positive response in mm tightness/flexibility albeit limitations persist.  Exercise performance improves with in clinic supervision/cueing in regards to performance.  Improved positioning and maintenance of R LE with ambulation in regards to neutral vs valgus positioning.          Other   Recert next visit by primary PT.  Check goals, ROM, etc.  Update outcome measure           Timed:  Manual Therapy:  15       mins  29162;  Therapeutic Exercise:   20      mins  34756;     Neuromuscular Wil:  12      mins  57289;    Therapeutic Activity:     8     mins  90256;     Gait Training:           mins  58560;     Ultrasound:          mins  52306;      Untimed:  Electrical Stimulation:         mins  67340 ( );  Mechanical Traction:         mins  53034;     Timed Treatment:  55  mins   Total " Treatment:    55    mins  Rey Sterling, [facility]  Physical Therapist  Assistant  [id number]

## 2022-10-28 ENCOUNTER — TREATMENT (OUTPATIENT)
Dept: PHYSICAL THERAPY | Facility: CLINIC | Age: 72
End: 2022-10-28

## 2022-10-28 DIAGNOSIS — R29.898 WEAKNESS OF BOTH HIPS: ICD-10-CM

## 2022-10-28 DIAGNOSIS — M25.661 KNEE JOINT STIFFNESS, BILATERAL: ICD-10-CM

## 2022-10-28 DIAGNOSIS — R29.898 WEAKNESS OF BOTH LEGS: Primary | ICD-10-CM

## 2022-10-28 DIAGNOSIS — M25.662 KNEE JOINT STIFFNESS, BILATERAL: ICD-10-CM

## 2022-10-28 PROCEDURE — 97110 THERAPEUTIC EXERCISES: CPT | Performed by: PHYSICAL THERAPIST

## 2022-10-28 PROCEDURE — 97140 MANUAL THERAPY 1/> REGIONS: CPT | Performed by: PHYSICAL THERAPIST

## 2022-11-01 ENCOUNTER — TREATMENT (OUTPATIENT)
Dept: PHYSICAL THERAPY | Facility: CLINIC | Age: 72
End: 2022-11-01

## 2022-11-01 DIAGNOSIS — R29.898 WEAKNESS OF BOTH HIPS: ICD-10-CM

## 2022-11-01 DIAGNOSIS — M25.662 KNEE JOINT STIFFNESS, BILATERAL: ICD-10-CM

## 2022-11-01 DIAGNOSIS — R29.898 WEAKNESS OF BOTH LEGS: Primary | ICD-10-CM

## 2022-11-01 DIAGNOSIS — M25.661 KNEE JOINT STIFFNESS, BILATERAL: ICD-10-CM

## 2022-11-01 PROCEDURE — 97140 MANUAL THERAPY 1/> REGIONS: CPT | Performed by: PHYSICAL THERAPIST

## 2022-11-01 PROCEDURE — 97530 THERAPEUTIC ACTIVITIES: CPT | Performed by: PHYSICAL THERAPIST

## 2022-11-01 PROCEDURE — 97110 THERAPEUTIC EXERCISES: CPT | Performed by: PHYSICAL THERAPIST

## 2022-11-01 NOTE — PROGRESS NOTES
Physical Therapy Daily Progress Note        Subjective     Jose Hurtado reports: I ordered brace and should get it tomorrow    Objective   See Exercise, Manual, and Modality Logs for complete treatment.       Assessment/Plan  Improved hip extension to neutral in prone position. ABle to do PHE but more difficult on L. Fatigued with standing exercises. Pt to bring back brace next visit for proper fitting    Progress per Plan of Care and Progress strengthening /stabilization /functional activity           Manual Therapy:  10       mins  98762;  Therapeutic Exercise: 20      mins  16976;     Neuromuscular Wil:       mins  73257;    Therapeutic Activity:    10     mins  85975;     Gait Training:         mins  10888;     Ultrasound:         mins  97908;    Electrical Stimulation:        mins  41197 ( );  Dry Needling         mins self-pay    Timed Treatment:   40   mins   Total Treatment:     40   mins    Caroline Vasquez PT  Physical Therapist  [place] License # [id number]

## 2022-11-04 ENCOUNTER — TREATMENT (OUTPATIENT)
Dept: PHYSICAL THERAPY | Facility: CLINIC | Age: 72
End: 2022-11-04

## 2022-11-04 DIAGNOSIS — M25.662 KNEE JOINT STIFFNESS, BILATERAL: ICD-10-CM

## 2022-11-04 DIAGNOSIS — M25.661 KNEE JOINT STIFFNESS, BILATERAL: ICD-10-CM

## 2022-11-04 DIAGNOSIS — R29.898 WEAKNESS OF BOTH LEGS: Primary | ICD-10-CM

## 2022-11-04 DIAGNOSIS — R29.898 WEAKNESS OF BOTH HIPS: ICD-10-CM

## 2022-11-04 PROCEDURE — 97110 THERAPEUTIC EXERCISES: CPT | Performed by: PHYSICAL THERAPIST

## 2022-11-04 PROCEDURE — 97140 MANUAL THERAPY 1/> REGIONS: CPT | Performed by: PHYSICAL THERAPIST

## 2022-11-04 PROCEDURE — 97530 THERAPEUTIC ACTIVITIES: CPT | Performed by: PHYSICAL THERAPIST

## 2022-11-04 NOTE — PROGRESS NOTES
Physical Therapy Daily Progress Note        Subjective     Jose Hurtado reports: Knees sore after last session. I still haven't received my back brace.     Objective   See Exercise, Manual, and Modality Logs for complete treatment.       Assessment/Plan  Unable to do hip extension on L today in prone but was able to last session. Improved ability to lay prone. Fatigued with clamshells with Tband    Progress per Plan of Care           Manual Therapy:  8       mins  33331;  Therapeutic Exercise: 25      mins  40319;     Neuromuscular Wil:       mins  40806;    Therapeutic Activity:    10     mins  77471;     Gait Training:         mins  96199;     Ultrasound:         mins  62669;    Electrical Stimulation:        mins  62982 ( );  Dry Needling         mins self-pay    Timed Treatment:   43   mins   Total Treatment:     43   mins    Caroline Vasquez PT  Physical Therapist  [place] License # [id number]

## 2022-11-07 ENCOUNTER — TREATMENT (OUTPATIENT)
Dept: PHYSICAL THERAPY | Facility: CLINIC | Age: 72
End: 2022-11-07

## 2022-11-07 DIAGNOSIS — R29.898 WEAKNESS OF BOTH HIPS: ICD-10-CM

## 2022-11-07 DIAGNOSIS — R29.898 WEAKNESS OF BOTH LEGS: Primary | ICD-10-CM

## 2022-11-07 DIAGNOSIS — M25.662 KNEE JOINT STIFFNESS, BILATERAL: ICD-10-CM

## 2022-11-07 DIAGNOSIS — M25.661 KNEE JOINT STIFFNESS, BILATERAL: ICD-10-CM

## 2022-11-07 PROCEDURE — 97110 THERAPEUTIC EXERCISES: CPT | Performed by: PHYSICAL THERAPIST

## 2022-11-07 NOTE — PROGRESS NOTES
Physical Therapy Daily Treatment Note      Patient: Jose Hurtado   : 1950  Referring practitioner: Brandin Bolton MD  Date of Initial Visit: Type: THERAPY  Noted: 2022  Today's Date: 2022  Patient seen for 12 sessions         Jose Hurtado reports:        Subjective     Objective   See Exercise, Manual, and Modality Logs for complete treatment.         **Limited exercise session due to pt being 30 min late for appt.    Assessment/Plan  Progressing with exercise regimen/activities.  Continues to benefit from postural cues and awareness of LE positioning with gait.        Progress per Plan of Care           Timed:  Manual Therapy:         mins  44408;  Therapeutic Exercise:    20     mins  41114;     Neuromuscular Wil:        mins  46842;    Therapeutic Activity:          mins  97911;     Gait Training:           mins  88906;     Ultrasound:          mins  84430;      Untimed:  Electrical Stimulation:         mins  41315 ( );  Mechanical Traction:         mins  48125;     Timed Treatment:   20   mins   Total Treatment:    20    mins  Rey Sterling PTA  Physical Therapist  Assistant  [id number]

## 2022-11-10 ENCOUNTER — TELEPHONE (OUTPATIENT)
Dept: PHYSICAL THERAPY | Facility: CLINIC | Age: 72
End: 2022-11-10

## 2022-11-14 ENCOUNTER — TELEPHONE (OUTPATIENT)
Dept: PHYSICAL THERAPY | Facility: CLINIC | Age: 72
End: 2022-11-14

## 2022-11-14 ENCOUNTER — TREATMENT (OUTPATIENT)
Dept: PHYSICAL THERAPY | Facility: CLINIC | Age: 72
End: 2022-11-14

## 2022-11-14 DIAGNOSIS — M25.661 KNEE JOINT STIFFNESS, BILATERAL: ICD-10-CM

## 2022-11-14 DIAGNOSIS — R29.898 WEAKNESS OF BOTH LEGS: Primary | ICD-10-CM

## 2022-11-14 DIAGNOSIS — R29.898 WEAKNESS OF BOTH HIPS: ICD-10-CM

## 2022-11-14 DIAGNOSIS — M25.662 KNEE JOINT STIFFNESS, BILATERAL: ICD-10-CM

## 2022-11-14 PROCEDURE — 97530 THERAPEUTIC ACTIVITIES: CPT | Performed by: PHYSICAL THERAPIST

## 2022-11-14 PROCEDURE — 97110 THERAPEUTIC EXERCISES: CPT | Performed by: PHYSICAL THERAPIST

## 2022-11-14 RX ORDER — APIXABAN 5 MG/1
TABLET, FILM COATED ORAL
Qty: 60 TABLET | Refills: 1 | Status: SHIPPED | OUTPATIENT
Start: 2022-11-14 | End: 2023-03-03

## 2022-11-16 ENCOUNTER — TELEPHONE (OUTPATIENT)
Dept: PHYSICAL THERAPY | Facility: CLINIC | Age: 72
End: 2022-11-16

## 2022-11-16 ENCOUNTER — TELEPHONE (OUTPATIENT)
Dept: INTERNAL MEDICINE | Age: 72
End: 2022-11-16

## 2022-11-16 NOTE — TELEPHONE ENCOUNTER
Caller: Jose Hurtado    Relationship: Self    Best call back number: [phone]    What is the best time to reach you: ANY    Who are you requesting to speak with (clinical staff, provider,  specific staff member): BRODIE    What was the call regarding: PATIENT STATES HE HAD AN APPOINTMENT TODAY, 11/16/22 AT 11:00 AM, CALLED IN TO THE OFFICE TO TELL THEM THERE WERE NO OPEN PARKING SPACES AVAILABLE. HE CALLED AROUND 11:30 AM TO RESCHEDULE DUE TO THIS ISSUE FOR 12/5/22. HE IS REQUESTING A CALL BACK FROM BRODIE TO DISCUSS THIS FURTHER.     Do you require a callback: YES

## 2022-11-18 NOTE — PROGRESS NOTES
Physical Therapy Daily Treatment Note      Patient: Jose Hurtado   : 1950  Referring practitioner: Brandin Bolton MD  Date of Initial Visit: Type: THERAPY  Noted: 2022  Today's Date: 22  Patient seen for 13 sessions         Jose Hurtado reports: knees are stiff, legs tired.  Has been doing a lot of standing and walking today.         Subjective     Objective   -presents without thoracolumbar brace  -posture when ambulating is noted to be forward flexed trunk from midline as well as bent knee positioning with ambulation along with valgus positioning of L knee during ambulation.    See Exercise, Manual, and Modality Logs for complete treatment.   -responds to verbal/tactile cues for posture and LE but short term only.    Added tandem stance/balance in corner 4 x 20 sec each LE      Assessment/Plan  Complaint/cooperative with rehab efforts.  Continues to present with decreased postural awareness and trunk postioning from mid line.  Position of knee remains valgus L vs R with ambulation.  Exercise performance and progression limited this session.  Benefits from verbal/tactile cues to ensure proper exercise performance/technique.        Progress per Plan of Care toward all goals           Timed:  Manual Therapy:         mins  48071;  Therapeutic Exercise: 20      mins  60319;     Neuromuscular Wil:   2     mins  09220;    Therapeutic Activity:    10      mins  90977;     Gait Training:           mins  17421;     Ultrasound:          mins  95270;      Untimed:  Electrical Stimulation:         mins  55015 ( );  Mechanical Traction:         mins  75567;     Timed Treatment:  32    mins   Total Treatment:   32    mins  Rey Sterling PTA  Physical Therapist  Assistant  [id number]

## 2022-11-22 ENCOUNTER — OFFICE VISIT (OUTPATIENT)
Dept: ONCOLOGY | Facility: CLINIC | Age: 72
End: 2022-11-22

## 2022-11-22 ENCOUNTER — LAB (OUTPATIENT)
Dept: OTHER | Facility: HOSPITAL | Age: 72
End: 2022-11-22

## 2022-11-22 VITALS
BODY MASS INDEX: 25.76 KG/M2 | SYSTOLIC BLOOD PRESSURE: 133 MMHG | TEMPERATURE: 98.2 F | HEART RATE: 42 BPM | HEIGHT: 77 IN | DIASTOLIC BLOOD PRESSURE: 57 MMHG | OXYGEN SATURATION: 97 % | WEIGHT: 218.2 LBS

## 2022-11-22 DIAGNOSIS — E61.1 IRON DEFICIENCY: Primary | Chronic | ICD-10-CM

## 2022-11-22 DIAGNOSIS — E61.1 IRON DEFICIENCY: ICD-10-CM

## 2022-11-22 DIAGNOSIS — Z85.01 HISTORY OF ESOPHAGEAL CANCER: ICD-10-CM

## 2022-11-22 LAB
BASOPHILS # BLD AUTO: 0.03 10*3/MM3 (ref 0–0.2)
BASOPHILS NFR BLD AUTO: 0.7 % (ref 0–1.5)
DEPRECATED RDW RBC AUTO: 47.9 FL (ref 37–54)
EOSINOPHIL # BLD AUTO: 0.18 10*3/MM3 (ref 0–0.4)
EOSINOPHIL NFR BLD AUTO: 4.4 % (ref 0.3–6.2)
ERYTHROCYTE [DISTWIDTH] IN BLOOD BY AUTOMATED COUNT: 13.6 % (ref 12.3–15.4)
FERRITIN SERPL-MCNC: 267.8 NG/ML (ref 30–400)
HCT VFR BLD AUTO: 38.1 % (ref 37.5–51)
HGB BLD-MCNC: 11.9 G/DL (ref 13–17.7)
HGB RETIC QN AUTO: 32.7 PG (ref 29.8–36.1)
IMM GRANULOCYTES # BLD AUTO: 0 10*3/MM3 (ref 0–0.05)
IMM GRANULOCYTES NFR BLD AUTO: 0 % (ref 0–0.5)
IMM RETICS NFR: 9.5 % (ref 3–15.8)
IRON 24H UR-MRATE: 52 MCG/DL (ref 59–158)
IRON SATN MFR SERPL: 14 % (ref 20–50)
LYMPHOCYTES # BLD AUTO: 1.44 10*3/MM3 (ref 0.7–3.1)
LYMPHOCYTES NFR BLD AUTO: 35.1 % (ref 19.6–45.3)
MCH RBC QN AUTO: 29.8 PG (ref 26.6–33)
MCHC RBC AUTO-ENTMCNC: 31.2 G/DL (ref 31.5–35.7)
MCV RBC AUTO: 95.5 FL (ref 79–97)
MONOCYTES # BLD AUTO: 0.34 10*3/MM3 (ref 0.1–0.9)
MONOCYTES NFR BLD AUTO: 8.3 % (ref 5–12)
NEUTROPHILS NFR BLD AUTO: 2.11 10*3/MM3 (ref 1.7–7)
NEUTROPHILS NFR BLD AUTO: 51.5 % (ref 42.7–76)
NRBC BLD AUTO-RTO: 0 /100 WBC (ref 0–0.2)
PLATELET # BLD AUTO: 155 10*3/MM3 (ref 140–450)
PMV BLD AUTO: 9 FL (ref 6–12)
RBC # BLD AUTO: 3.99 10*6/MM3 (ref 4.14–5.8)
RETICS # AUTO: 0.03 10*6/MM3 (ref 0.02–0.13)
RETICS/RBC NFR AUTO: 0.78 % (ref 0.7–1.9)
TIBC SERPL-MCNC: 374 MCG/DL (ref 298–536)
TRANSFERRIN SERPL-MCNC: 251 MG/DL (ref 200–360)
WBC NRBC COR # BLD: 4.1 10*3/MM3 (ref 3.4–10.8)

## 2022-11-22 PROCEDURE — 82728 ASSAY OF FERRITIN: CPT | Performed by: INTERNAL MEDICINE

## 2022-11-22 PROCEDURE — 84466 ASSAY OF TRANSFERRIN: CPT | Performed by: INTERNAL MEDICINE

## 2022-11-22 PROCEDURE — 99214 OFFICE O/P EST MOD 30 MIN: CPT | Performed by: INTERNAL MEDICINE

## 2022-11-22 PROCEDURE — 85025 COMPLETE CBC W/AUTO DIFF WBC: CPT | Performed by: INTERNAL MEDICINE

## 2022-11-22 PROCEDURE — 85046 RETICYTE/HGB CONCENTRATE: CPT | Performed by: INTERNAL MEDICINE

## 2022-11-22 PROCEDURE — 36415 COLL VENOUS BLD VENIPUNCTURE: CPT

## 2022-11-22 PROCEDURE — 83540 ASSAY OF IRON: CPT | Performed by: INTERNAL MEDICINE

## 2022-11-22 NOTE — PROGRESS NOTES
"    DAILY PROGRESS NOTE  [facility]    Patient Identification:  Name: Jose Hurtado  Age: 70 y.o.  Sex: male  :  1950  MRN: [id number]         Primary Care Physician: Brandin Bolton MD    Subjective:  Interval History: No new complaints today.  Denies chest pain.  Denies any issues with emesis.  Also denies any problems with confusion.  He clinically feels much better at this juncture.  He does not feel he is choking on any secretions    Objective: Spouse at bedside.  All questions answered.  Patient clinically much improved with sitting up in bed interactive and conversational and certainly nontoxic-appearing    Scheduled Meds:  amoxicillin-clavulanate 1 tablet Oral Q12H   ferrous sulfate 325 mg Oral QAM AC   furosemide 20 mg Oral Daily   guaiFENesin 1,200 mg Oral Q12H   ipratropium-albuterol 3 mL Nebulization 4x Daily - RT   pantoprazole 40 mg Oral BID AC   PARoxetine 40 mg Oral QAM   potassium chloride 10 mEq Oral Daily   pramipexole 0.5 mg Oral BID   tamsulosin 0.4 mg Oral Nightly   traZODone 100 mg Oral Nightly   warfarin 1.5 mg Oral Daily     Continuous Infusions:  Pharmacy to dose warfarin        Vital signs in last 24 hours:  Temp:  [98.1 °F (36.7 °C)-98.8 °F (37.1 °C)] 98.1 °F (36.7 °C)  Heart Rate:  [76-92] 83  Resp:  [18-20] 20  BP: (132-144)/(66-78) 144/70    Intake/Output:    Intake/Output Summary (Last 24 hours) at 2020 1321  Last data filed at 2020 0644  Gross per 24 hour   Intake --   Output 2600 ml   Net -2600 ml       Exam:  /70 (BP Location: Right arm, Patient Position: Lying)   Pulse 83   Temp 98.1 °F (36.7 °C) (Oral)   Resp 20   Ht 195.6 cm (77\")   Wt 89.4 kg (197 lb 1.6 oz)   SpO2 97%   BMI 23.37 kg/m²     General Appearance:    Alert, cooperative, no distress, AAOx3                         Throat:   Oral mucosa pink and moist                           Neck:   No JVD                         Lungs:    Decreased right base otherwise clear to " auscultation bilaterally, respirations unlabored with no use of accessory muscles                 Chest Wall:    No tenderness or deformity                          Heart:    Regular rate and rhythm, S1 and S2 normal                  Abdomen:     Soft, non-tender, bowel sounds active                 Extremities: Leg wraps to bilateral lower extremities with no aspects of edema proximal                             Data Review:  Labs in chart were reviewed.    Assessment:  Active Hospital Problems    Diagnosis  POA   • Elevated troponin [R79.89]  Yes   • Pneumonia of right lower lobe due to infectious organism (CMS/HCC) [J18.1]  Yes   • Iron deficiency [E61.1]  Yes   • Lymphedema [I89.0]  Yes   • Long-term (current) use of anticoagulants, INR goal 2.0-3.0 [Z79.01]  Not Applicable   • Postsurgical malabsorption [K91.2]  Yes   • Peripheral polyneuropathy [G62.9]  Yes   • Anemia [D64.9]  Yes   • Anti-phospholipid syndrome (CMS/HCC) [D68.61]  Yes   • Hypertension [I10]  Yes   • RLS (restless legs syndrome) [G25.81]  Yes      Resolved Hospital Problems   No resolved problems to display.       Plan:    Right lower lobe aspiration pneumonia with effusion   -Appreciate pulmonology input and assistance - they will reconsider thoracentesis at a later time   -Probable aspiration component as esophagram abnormal with concerns for delayed gastric emptying   -On room air and antibiotics transition to p.o. Augmentin   -GES pending -VFSS with laryngeal penetration without aspiration see report for further clarification for additional abnormalities -Reglan initiated and current liquid diet per GI    Chronic lymphedema continue leg wraps    Chronic anticoagulation due to antiphospholipid syndrome   -INR therapeutic at 2.05    Chronic RLS    Chronic King    Chronic anemia -globin stable and trending up    Elevated troponin at admission without chest pain        Disposition -ultimately pending GI input anticipate we can get this patient  home tomorrow.  I would appreciate any final recommendations from GI in regards to endoscopy and further evaluation in an outpatient setting.  Antibiotics already transitioned to p.o. and patient is stable on room air.  Dietary advancement per GI    Mo Jesus MD  2/13/2020  1:21 PM     Continue Regimen: TAC bid prn Detail Level: Zone Render In Strict Bullet Format?: No

## 2022-11-22 NOTE — PROGRESS NOTES
Subjective .     REASONS FOR FOLLOWUP:  Esophageal cancer, cytopenias     HISTORY OF PRESENT ILLNESS:  The patient is a 72 y.o. year old male  who is here for follow-up with the above-mentioned history.    No bleeding or chest pain.  Has been more SOA for the past couple of weeks with activity.  Not at rest.  Advised to discuss with his PCP.  States he has an appoint with his PCP next week.  I told him if he gets worse he should go to the ER.  He states he is recently had lots of cardiac testing which was all negative.    Past Medical History:   Diagnosis Date   • Acute deep vein thrombosis (DVT) of popliteal vein of left lower extremity (HCC) 03/24/2020   • Anemia    • Anti-phospholipid syndrome (HCC)     On lifelong anticoagulation therapy   • Bladder disorder     LEAKAGE   ON MED  wers pads   • Bleeding hemorrhoids    • Community acquired pneumonia     HISTORY OF IN 2014   • Deep venous thrombosis (HCC) 2006, 2008    Left lower extremity multiple   • Depression    • Esophageal carcinoma (HCC) 12/31/2014    had chemo and radiation prior surgery   • Hemorrhoids    • HH (hiatus hernia)    • History of atrial fibrillation 2015    ONE EPISODE WHILE HOSPITALIZED   • History of kidney stones    • History of nephrolithiasis    • History of pancreatitis     PT STATES MANY YEARS AGO   • History of radiation therapy    • History of transfusion    • Hypertension    • Long-term (current) use of anticoagulants, INR goal 2.0-3.0    • Lymphedema    • Malignant neoplasm of prostate (HCC)    • Other hyperlipidemia 1/30/2018 January 30, 2018 lipid panel risk 12.8%   • Restless legs syndrome    • Sleep apnea     OCCASIONALLY WEARS CPAP   • Squamous carcinoma     on the head     Past Surgical History:   Procedure Laterality Date   • APPENDECTOMY  1950   • BRONCHOSCOPY      (Diagnostic)   • CARDIAC CATHETERIZATION N/A 5/14/2022    Procedure: Left Heart Cath;  Surgeon: Eric So MD;  Location: [facility] INVASIVE  LOCATION;  Service: Cardiology;  Laterality: N/A;   • CARDIAC CATHETERIZATION N/A 5/14/2022    Procedure: Coronary angiography;  Surgeon: Eric So MD;  Location: [facility] CATH INVASIVE LOCATION;  Service: Cardiology;  Laterality: N/A;   • CARDIAC CATHETERIZATION N/A 5/14/2022    Procedure: Left ventriculography;  Surgeon: Eric So MD;  Location: [facility] CATH INVASIVE LOCATION;  Service: Cardiology;  Laterality: N/A;   • CATARACT EXTRACTION Bilateral 2014   • COLONOSCOPY  12/15/2014    Complete / Description: EH, IH, torts, stool, follow-up colonoscopy due in 5 years.   • COLONOSCOPY N/A 6/13/2017    non-thrombosed external hemorrhoids, normal examined ileum, IH   • COLONOSCOPY N/A 2/26/2019    Procedure: COLONOSCOPY with Cold Polypectomy;  Surgeon: Mulugeta Millan MD;  Location: [facility] ENDOSCOPY;  Service: Gastroenterology   • COLONOSCOPY N/A 12/16/2020    Procedure: COLONOSCOPY to cecum into TI;  Surgeon: Mesha Sanchez MD;  Location: [facility] ENDOSCOPY;  Service: Gastroenterology;  Laterality: N/A;  pre: lower GI bleed  post: hemorrhoids    • CYSTOSCOPY W/ LASER LITHOTRIPSY     • ENDOSCOPY N/A 6/13/2017    Procedure: ESOPHAGOGASTRODUODENOSCOPY WITH COLD BIOPSY;  Surgeon: Lake Gonzalez MD;  Location: [facility] ENDOSCOPY;  Service:    • ENDOSCOPY N/A 11/18/2021    Procedure: ESOPHAGOGASTRODUODENOSCOPY WITH  DILATATION WITH FLUOROSCOPY;  Surgeon: Jose Christine III, MD;  Location: [facility] ENDOSCOPY;  Service: Gastroenterology;  Laterality: N/A;  Pre: dysphagia, h/x of adinocarcinoma of esophagus  Post: same   • ESOPHAGECTOMY      April 2015, stage IIB esophageal carcinoma, sub-total resection.   • ESOPHAGECTOMY      Esophagectomy Subtotal Girdler Joe Procedure   • EXCISION LESION  08/2012    Removal of Squamous Cell CA on Head   • HAMMER TOE REPAIR  09/2014    Hammertoe Operation (Each Toe), 10/2014   • HAMMER TOE REPAIR Left 10/3/2017    Procedure: Left second third and fourth  distal interphalangeal joint resection with flexor tenotomy;  Surgeon: Mulugeta Lira MD;  Location: [facility] OSC;  Service:    • HEMORRHOIDECTOMY N/A 12/23/2020    Procedure: HEMORRHOIDECTOMY;  Surgeon: Billy Julio Jr., MD;  Location: [facility] OR;  Service: General;  Laterality: N/A;   • HERNIA REPAIR      incisional   • JEJUNOSTOMY      Laparoscopic   • JEJUNOSTOMY      tube removal    • KNEE SURGERY Bilateral 1967, 1973, 1981   • PATELLA SURGERY Left     removed   • PILONIDAL CYST / SINUS EXCISION     • WA TOTAL KNEE ARTHROPLASTY Right 3/26/2018    Procedure: TOTAL KNEE ARTHROPLASTY;  Surgeon: Renny Solis MD;  Location: [facility] OR;  Service: Orthopedics   • PROSTATECTOMY  2010   • PYLOROPLASTY     • SPINAL FUSION  02/1998    C 5,6   • TONSILLECTOMY     • UPPER GASTROINTESTINAL ENDOSCOPY  12/15/2014    LA Grade D esophagitis, Pardo's, HH, multiple duodenal ulcers   • VENTRAL/INCISIONAL HERNIA REPAIR N/A 4/14/2016    Procedure: VENTRAL/INCISIONAL HERNIA REPAIR, open, with mesh, and component separation;  Surgeon: Darren Rivas MD;  Location: [facility] OR;  Service:        HEMATOLOGIC/ONCOLOGIC HISTORY:  (History from previous dates can be found in the separate document.)    MEDICATIONS    Current Outpatient Medications:   •  albuterol sulfate  (90 Base) MCG/ACT inhaler, Inhale 1 puff Every 4 (Four) Hours As Needed for Wheezing., Disp: , Rfl:   •  atorvastatin (LIPITOR) 40 MG tablet, Take 1 tablet by mouth Every Night., Disp: 90 tablet, Rfl: 3  •  cyanocobalamin 1000 MCG/ML injection, , Disp: , Rfl:   •  Eliquis 5 MG tablet tablet, TAKE ONE TABLET BY MOUTH EVERY 12 HOURS, Disp: 60 tablet, Rfl: 1  •  furosemide (LASIX) 20 MG tablet, Take 1 tablet by mouth Daily., Disp: 90 tablet, Rfl: 3  •  Gemtesa 75 MG tablet, Daily., Disp: , Rfl:   •  metoprolol tartrate (LOPRESSOR) 25 MG tablet, Take 1 tablet by mouth 2 (Two) Times a Day., Disp: 180 tablet, Rfl: 3  •  pantoprazole  "(PROTONIX) 40 MG EC tablet, TAKE ONE TABLET BY MOUTH TWICE A DAY BEFORE A MEAL, Disp: 180 tablet, Rfl: 3  •  PARoxetine (PAXIL) 40 MG tablet, TAKE ONE TABLET BY MOUTH EVERY MORNING, Disp: 30 tablet, Rfl: 5  •  potassium chloride (MICRO-K) 10 MEQ CR capsule, Take 1 capsule by mouth Daily., Disp: 90 capsule, Rfl: 5  •  pramipexole (MIRAPEX) 1.5 MG tablet, TAKE ONE TABLET BY MOUTH TWICE A DAY, Disp: 180 tablet, Rfl: 1  •  Syringe 25G X 1\" 3 ML misc, Use along with B12 . 1 ml into the appropriate side of the muscle once every 30 days., Disp: 50 each, Rfl: 1    ALLERGIES:   No Known Allergies    SOCIAL HISTORY:       Social History     Socioeconomic History   • Marital status:      Spouse name: Lena   • Number of children: 0   • Years of education: College   Tobacco Use   • Smoking status: Former     Packs/day: 2.00     Years: 3.00     Pack years: 6.00     Types: Cigarettes     Quit date:      Years since quittin.9   • Smokeless tobacco: Former     Types: Chew   • Tobacco comments:     no caffeine    Vaping Use   • Vaping Use: Never used   Substance and Sexual Activity   • Alcohol use: Yes     Alcohol/week: 3.0 standard drinks     Types: 1 Glasses of wine, 1 Cans of beer, 1 Shots of liquor per week     Comment: 1-2 drinks/week   • Drug use: Not Currently     Types: Marijuana     Comment: hx marijuana use \"a long time ago\"   • Sexual activity: Defer         FAMILY HISTORY:  Family History   Problem Relation Age of Onset   • Cerebral aneurysm Mother         cerebral artery aneurysm ( age 56)   • Prostate cancer Brother 68   • Anxiety disorder Father    • Suicide Attempts Father          of suicide   • Cancer Father         bladder   • No Known Problems Brother    • Pancreatic cancer Nephew    • Colon cancer Neg Hx    • Esophageal cancer Neg Hx    • Dementia Neg Hx    • Malig Hyperthermia Neg Hx        REVIEW OF SYSTEMS:    Review of Systems   Constitutional: Negative for activity change.   HENT: " "Negative for nosebleeds and trouble swallowing.    Respiratory: Positive for shortness of breath. Negative for wheezing.    Cardiovascular: Negative for chest pain and palpitations.   Gastrointestinal: Negative for constipation and diarrhea.   Genitourinary: Negative for dysuria and hematuria.   Musculoskeletal: Negative for arthralgias and myalgias.   Neurological: Negative for seizures and syncope.   Hematological: Negative for adenopathy. Does not bruise/bleed easily.   Psychiatric/Behavioral: Negative for confusion.           Objective    Vitals:    11/22/22 1430   BP: 133/57   Pulse: (!) 42   Temp: 98.2 °F (36.8 °C)   TempSrc: Temporal   SpO2: 97%   Weight: 99 kg (218 lb 3.2 oz)   Height: 195 cm (76.77\")     Current Status 11/22/2022   ECOG score 1      PHYSICAL EXAM:        CONSTITUTIONAL:  Vital signs reviewed.  No distress, looks comfortable.  EYES:  Conjunctiva and lids unremarkable.  PERRLA  EARS,NOSE,MOUTH,THROAT:  Ears and nose appear unremarkable.  Lips, teeth, gums appear unremarkable.  RESPIRATORY:  Normal respiratory effort.  Lungs clear to auscultation bilaterally.  CARDIOVASCULAR:  Normal S1, S2.  No murmurs rubs or gallops.  Significant bilateral lower extremity edema which is unchanged.  He has been to the lymphedema clinic for this.  GASTROINTESTINAL: Abdomen appears unremarkable.  Nontender.  No hepatomegaly.  No splenomegaly.  LYMPHATIC:  No cervical, supraclavicular, axillary lymphadenopathy.  SKIN:  Warm.  No rashes.  PSYCHIATRIC:  Normal judgment and insight.  Normal mood and affect.                RECENT LABS:        WBC   Date/Time Value Ref Range Status   11/22/2022 02:24 PM 4.10 3.40 - 10.80 10*3/mm3 Final   04/15/2019 12:31 PM 3.53 3.40 - 10.80 10*3/mm3 Final   04/16/2018 04:25 PM 4.23 (L) 4.5 - 11.0 10*3/uL Final     Hemoglobin   Date/Time Value Ref Range Status   11/22/2022 02:24 PM 11.9 (L) 13.0 - 17.7 g/dL Final   04/16/2018 04:25 PM 9.9 (L) 13.5 - 17.5 g/dL Final     Platelets "   Date/Time Value Ref Range Status   11/22/2022 02:24  140 - 450 10*3/mm3 Final   04/16/2018 04:25  140 - 440 10*3/uL Final       Assessment/Plan     ASSESSMENT:  Iron deficiency  - Ferritin  - Iron Profile  - Retic With IRF & RET-He  - CBC & Differential      *Esophageal adenocarcinoma. Initially T2N0M0. After neoadjuvant carboplatin/Taxol with radiation, achieved a pathologic CR at resection, 4/24/2015.   · As per NCCN guidelines, plan CAT scans every 6 months x1 year, then every 6 to 9 months on years 2 and years 3. Defer any surveillance EGDs to Dr. Gonzalez if he feels they are appropriate.   · Also as per NCCN guidelines, plan H and P every 3 to 6 months on years 1 and years 2, then every 6 to 12 months' time on years 3 through years 5 and then annually.   · EGD 6/13/17 by Dr. Gonzalez: No evidence of recurrence.  · CT scan 3/2/18 (this completed 3 years of surveillance CT): No evidence of recurrence.  Plan no more surveillance CT.  · EGD 11/18/2021, Dr. Christine: No evidence of recurrent cancer.  Dilated.  No evidence of recurrence.  Remains in remission.    *Recurrent DVT, prior to cancer diagnosis.    · Dr. Bolton previously managed his Coumadin.   · Chronic left leg larger than right, since DVT  · Acute left popliteal, gastrocnemius DVT on 3/24/2020 despite INR 3.4.  Therefore, changed to Eliquis.  · On 3/25/2020, unremarkable: Lupus anticoagulant, beta-2 glycoprotein antibodies.  Anticardiolipin antibodies also felt to be unremarkable with IgG and IgM both at 15 (negative is <15.  Indeterminate is 15-20).  No evidence of recurrent DVT.    *CT angiogram chest 3/24/2020 (LLE acute DVT found 3/24/2020): Mild increased opacity LLL may represent developing infiltrate versus atelectasis.  Radiologist recommended follow-up.  · CT chest 5/1/2020: Resolution of groundglass LLL infiltrate from the 3/24/2020 CT.  A few mildly enlarged mediastinal nodes are less prominent than on the 3/18/2020 CT.  No new  abnormalities.  Plan no more follow-up CT scans.    *Persistent cough.  He has seen Dr. Maher Sayied for this.    He did not complain of this today.    *Previously complained of emesis occurring 5 hours after eating on average once every month or 2.  No nausea otherwise.  Denies dysphagia or odynophagia.    Unchanged.  Occurring on average once every couple of months.  He did not complain of this today    *Iron deficiency anemia  · Hb mostly around 11  · On 4/15/2019, ferritin 29.7, 13% saturation.  Serum folate normal.  · On oral iron daily through PCP.  · On 8/23/19, ferritin 65, 14%.   · Increased oral iron.   · On 10/15/19, ferritin 72, 10%.  · On 10/25/2019, stopped oral iron since it was not helping.    · Oral iron not effective due to poor absorption  · 2 doses Injectafer.  · On 12/13/2019, ferritin 708, 15% saturation, Hb 10.4.  · Therefore, no IV iron.  Monitor.  · On 5/5/2020, ferritin 346, 15% saturation, Hb 9.9.  Therefore, no need for IV iron at this time.  · On 7/7/2020, Hb up to 11.7.  Iron labs normal.  · Admission 12/15/2020: Hb 6.6.  Ferritin 40, iron saturation 17%.  Discharged on 12/21/2020 with Hb 7.1, which fell from 7.9 on 12/18/2020, from 8.9 in the early morning 12/18/2020.  The drop in Hb was thought to be due to hemorrhoidal bleeding related to Eliquis.  Eliquis was stopped.  Hemorrhoidal repair planned by Dr. Flynn Julio after the holidays.  Was given Venofer 300 mg on 12/17/2020 by Dr. Ross of our practice  · On 12/29/2020, ferritin 176, 13% saturation, Hb 8.4, reticulate hemoglobin low at 25.7.  Suspect he would benefit from some more iron.  Given 1 dose Injectafer.  · On 2/2/2021, ferritin 317, 17% saturation, Hb 10.3.  Therefore, Hb gradually improved since the 1 dose of Injectafer.  · 3/2/2021: Ferritin iron 93, 11% saturation, Hb 11.9.  1 dose Injectafer.  · 3/23/2021: Ferritin 471, 20% saturation, Hb 10.8  · 7/6/2021: Ferritin 329, 21% saturation, Hb 10.8  · 9/28/2021: Ferritin  230, 20% saturation, Hb 11.4.  No need for IV iron.  · 12/28/2021: Ferritin 337, 6% iron saturation, Hb 10.4.  No IV iron given.  (Although saturation is low, ferritin is 337).  · 3/22/2022: Ferritin 112, 12% saturation, Hb 10.6.  Plan 1 dose Injectafer.  · 5/31/2022: Ferritin 473, 23% saturation, Hb 11.4  · 8/30/2022: Ferritin 308, 17% saturation, Hb 11.7.  No need for Injectafer  · 11/22/2022: Ferritin 268, 14% saturation.  Hb 11.9.  No need for Injectafer.    *Hemorrhoidal bleeding with Hb down to 6.6, requiring admission, 12/15/2020.  Thought to be related to Eliquis.  · Eliquis was stopped.  · Hemorrhoidal repair by Dr. Flynn Julio, 12/23/2020  · Eliquis subsequently restarted with no more issues with bleeding.  · 1 episode of dark stools yesterday, 9/27/2021.  Told him if he notices more of this he should contact Dr. Sanchez.  · 3/22/2022: Denies any rectal bleeding.  States this has not really been an issue since the hemorrhoidal repair  · 8/30/2022: Denies bleeding  · 11/22/2022: Denies bleeding    *9/28/2021: Change in stool frequency.  · Was having a bowel movement 3 times per week.  For the past week has been having 3 formed bowel movements per day.  I told him if this persists he should discuss with Dr. Sanchez.    *Source of iron deficiency.  · Last colonoscopy 2/26/2019 by Dr. Millan.  Last EGD 6/13/2017 by Dr. Gonzalez.  · Suspect he has an absorption issue due to previous esophageal surgery.    *B12 deficiency.  · On 6/6/2019, B12 <150  · On B12 injections through PCP.  · B12 on 5/5/2020 normal at 694  · B12 on 2/2/2021, 789    *Anemia for reasons in addition to iron deficiency and B12 deficiency  · Baseline Hb mostly 10.5-11.5.  · On 5/5/2020, unremarkable: RBC folate, iron labs, B12, haptoglobin, TB, LDH, direct Maki.  · Creatinine baseline is 0.9-1.2   · Hb 9.9 on 5/5/2020  · On 7/7/2020, Hb up to 11.7.  Return to prior follow-up plan.  · On 2/2/2021, B12 and RBC folate unremarkable  · 3/23/2021:  Hb 10.8 despite normal iron stores.  · 5/25/2021, Hb 10.7 with normal iron labs  · 7/6/2021, Hb 10.8 with normal iron labs  · Hb 11.9, from 11.7, from 11.4    *Leukocytopenia  · New issue on 3/23/2021, WBC 3.38, based on recent labs, but WBC has been as low as 2.9 December 2019  · WBC 4.1, from 4.6, from 4    *Neutropenia  · ANC 1590 on 3/23/2021 (previously ANC has been as low as 1480 with WBC in the low normal range)  · ANC 2110, from 2560, from 1690    *Thrombocytopenia  · New issue on 3/23/2021, , based on recent labs, but PLT has been as low as 119 October 2019  · , from 174, from 158    *Dyspnea on exertion x2 weeks on 11/22/2022 visit  · Advised to discuss with his PCP and in the meantime if it worsens he should go to the ER    *He had a white blood cell count in the upper 3s and a hemoglobin in the upper 12s with a normal platelet count prior to beginning chemotherapy.     PLAN:  · MD CBC stat ferritin, iron panel, reticulate hemoglobin 3 months  · Baseline Hb when not in the hospital mostly 9.5-11.5  · Continue Eliquis  · On 7/6/2021, he stated he had been forgetting his morning dose.  He insists he will do better about taking twice per day dosing    · hemorrhoids have been repaired.  Last drop of Hb was down to 7.1 on 12/21/2020.  (Hemorrhoidal bleeding)    Prior plan was:  · MD every 6-month.  · No more surveillance CT scans.  · He does not have a port.    I have discussed with him if Hb drops and remains around 9 or so, we may want to plan a bone marrow biopsy

## 2022-11-23 ENCOUNTER — TREATMENT (OUTPATIENT)
Dept: PHYSICAL THERAPY | Facility: CLINIC | Age: 72
End: 2022-11-23

## 2022-11-23 DIAGNOSIS — M25.662 KNEE JOINT STIFFNESS, BILATERAL: ICD-10-CM

## 2022-11-23 DIAGNOSIS — R29.898 WEAKNESS OF BOTH LEGS: Primary | ICD-10-CM

## 2022-11-23 DIAGNOSIS — M25.661 KNEE JOINT STIFFNESS, BILATERAL: ICD-10-CM

## 2022-11-23 DIAGNOSIS — R29.898 WEAKNESS OF BOTH HIPS: ICD-10-CM

## 2022-11-23 PROCEDURE — 97140 MANUAL THERAPY 1/> REGIONS: CPT | Performed by: PHYSICAL THERAPIST

## 2022-11-23 PROCEDURE — 97110 THERAPEUTIC EXERCISES: CPT | Performed by: PHYSICAL THERAPIST

## 2022-11-23 PROCEDURE — 97112 NEUROMUSCULAR REEDUCATION: CPT | Performed by: PHYSICAL THERAPIST

## 2022-11-23 NOTE — PROGRESS NOTES
Physical Therapy Daily Progress Note        Subjective     Jose Hurtado reports: I have worn back brace a few times. Helps a little. Going to get new machine to help with lymphedema on LLE. I have not been laying on stomach much    Objective   See Exercise, Manual, and Modality Logs for complete treatment.       Assessment/Plan  Restricted LE flexibiity as well as decreased lumbar extension. Advised pt to work on prone exercises more and bring in back brace next visit    Progress per Plan of Care      Manual Therapy:  8       mins  21897;  Therapeutic Exercise: 20      mins  76949;     Neuromuscular Wil:10       mins  69201;    Therapeutic Activity:         mins  38382;     Gait Training:         mins  84052;     Ultrasound:         mins  44240;    Electrical Stimulation:        mins  78559 ( );  Dry Needling         mins self-pay    Timed Treatment:   38   mins   Total Treatment:     38   mins    Caroline Vasquez, PT  Physical Therapist  [place] License # [id number]

## 2022-11-28 NOTE — PROGRESS NOTES
Physical Therapy Daily Treatment Note      Patient: Jose Hurtado   : 1950  Referring practitioner: Brandin Bolton MD  Date of Initial Visit: Type: THERAPY  Noted: 2022  Today's Date: 10/19/22  Patient seen for 8 sessions         Jose Hurtado reports: that stretching you do helps me the most. I always feel better after       Subjective     Objective   See Exercise, Manual, and Modality Logs for complete treatment.     Exercise performance this session consisting of:  Nu Step Lv 5 x 10 min  Leg press with ball squeeze 80 lbs 3 x 10 BLE's  Sit to stands 23 inch and 20 inch box heights x 10 each  Bridge with t band(red) in slight hip abd 2 x 10  Supine hs curls B LE with exercise ball(red)  Sidelying SLR hip abd 2 x 10 each  BKFO's with t band(blue) 2 x 10 each   Low julieta step overs in // bars x 4 loops minimal touch.  Standing hip flexor stretching -against wall with OH arm reaches 4 each LE x 20 sec each.     Manual interventions x 15 min consisting of passive stretch B LE's calf, hs, quad/hip flexors    Assessment/Plan  Positive response to manual interventions B LE's per subjective report.  Improved posture and ambulation post session noted via objective observation.  Encouraged to continued with daily home performance for posture, stretching and balance activities.        Progress per Plan of Care toward all goals.           Timed:  Manual Therapy:   15      mins  42281;  Therapeutic Exercise:   20      mins  56120;     Neuromuscular Wil:    4    mins  84487;    Therapeutic Activity:     10     mins  92541;     Gait Training:           mins  13802;     Ultrasound:          mins  54713;      Untimed:  Electrical Stimulation:         mins  19485 ( );  Mechanical Traction:         mins  07243;     Timed Treatment:  49    mins   Total Treatment:    49    mins  Rey Sterling PTA  Physical Therapist  Assistant  [id number]

## 2022-11-28 NOTE — PROGRESS NOTES
Physical Therapy Daily Treatment Note      Patient: Jose Hurtado   : 1950  Referring practitioner: Brandin Bolton MD  Date of Initial Visit: Type: THERAPY  Noted: 2022  Today's Date: 10/17/22  Patient seen for 7 sessions         Jsoe Hurtado reports: doing ok, legs are stiff.        Subjective     Objective   -ambulates in/out of clinic with straight cane;  Gait antalgic with noted decreased step and stride length. Varsus positioning L vs R  -ace wraps B LE's for lymphedema control    See Exercise, Manual, and Modality Logs for complete treatment.     Exercise performance this session consisting of:  Nu Step Lv 5 x 10 min  Leg press with ball squeeze 80 lbs 3 x 10 BLE's  Sit to stands 23 inch and 20 inch box heights x 10 each  Bridge with t band(red) in slight hip abd 2 x 10  Supine hs curls B LE with exercise ball(red)  Sidelying SLR hip abd 2 x 10 each  BKFO's with t band(blue) 2 x 10 each   Low julieta step overs in // bars x 4 loops minimal touch.  Standing hip flexor stretching -against wall with OH arm reaches 4 each LE x 20 sec each.    Manual interventions x 15 min consisting of passive stretch B LE's calf, hs, quad/hip flexors    Assessment/Plan  Good recall and performance of in clinic/HEP exercises, some verbal/tactile cues required to ensure proper exercise performance, technique and positioning.  Benefits from manual stretching and emphasized importance of continuing performance at home.      Progress strengthening /stabilization /functional activity           Timed:  Manual Therapy:    15     mins  86003;  Therapeutic Exercise:     20    mins  78937;     Neuromuscular Wil:    4    mins  74324;    Therapeutic Activity:   10       mins  52702;     Gait Training:           mins  53121;     Ultrasound:          mins  34188;      Untimed:  Electrical Stimulation:         mins  08423 ( );  Mechanical Traction:         mins  21433;     Timed Treatment: 49     mins   Total Treatment:    49     nii Sterling, [facility]  Physical Therapist  Assistant  [id number]

## 2022-11-29 ENCOUNTER — TELEPHONE (OUTPATIENT)
Dept: PHYSICAL THERAPY | Facility: CLINIC | Age: 72
End: 2022-11-29

## 2022-12-02 ENCOUNTER — TREATMENT (OUTPATIENT)
Dept: PHYSICAL THERAPY | Facility: CLINIC | Age: 72
End: 2022-12-02

## 2022-12-02 DIAGNOSIS — I89.0 LYMPHEDEMA: ICD-10-CM

## 2022-12-02 DIAGNOSIS — R29.898 WEAKNESS OF BOTH LEGS: Primary | ICD-10-CM

## 2022-12-02 DIAGNOSIS — R29.898 WEAKNESS OF BOTH HIPS: ICD-10-CM

## 2022-12-02 DIAGNOSIS — M25.661 KNEE JOINT STIFFNESS, BILATERAL: ICD-10-CM

## 2022-12-02 DIAGNOSIS — M25.662 KNEE JOINT STIFFNESS, BILATERAL: ICD-10-CM

## 2022-12-02 PROCEDURE — 97110 THERAPEUTIC EXERCISES: CPT | Performed by: PHYSICAL THERAPIST

## 2022-12-02 PROCEDURE — 97530 THERAPEUTIC ACTIVITIES: CPT | Performed by: PHYSICAL THERAPIST

## 2022-12-02 NOTE — PROGRESS NOTES
Re-Assessment / Re-Certification      Patient: Jose Hurtado   : 1950  Diagnosis/ICD-10 Code:  Weakness of both legs [R29.898]  Referring practitioner: Brandin Bolton MD  Date of Initial Visit: 2022  Today's Date: 2022  Patient seen for 15 sessions      Subjective:   Jose Hurtado reports: I have been feeling better this past week. Brought in back brace for me to observe and modify if needed. Wearing brace about an hour a day  Subjective Questionnaire: LEFS: 36/80 ( was 29 at IE)  Clinical Progress: improved  Home Program Compliance: Yes  Treatment has included: therapeutic exercise, neuromuscular re-education, manual therapy, therapeutic activity and gait training    Objective   Active Range of Motion   Left Knee   Flexion: 115 degrees   Extension : -10 degrees      Right Knee   Flexion: 124 degrees   Extension: -12 degrees     Strength/Myotome Testing      Left Hip   Planes of Motion   Flexion: 4  Abduction: 4-     Right Hip   Planes of Motion   Flexion: 4  Abduction: 4     Left Knee   Flexion: 4  Quadriceps contraction: good     Right Knee   Flexion: 4  Quadriceps contraction: good     Left Ankle/Foot   Dorsiflexion: 4     Right Ankle/Foot   Dorsiflexion: 4    Assessment/Plan   Suggested brace 1 hour, 2x/day    STGs 4 weeks:  1. Pt to perform gym and HEP with no increase in strain to groin or lower back  2. Pt to start utilizing the pool at his gym for endurance walking and exercise NO  3. PT to assess pt's knee brace and help with possible purchase of MCL type brace to slow down valgus instability NO  4. ROM L knee to improve from 0- to 0-  5. Pt to state decreased pain levels both knees by 50% improvement PROGRESSING  LTGs 12 weeks:  1. LEFS score to improve from 29/80 to > 45/80 PROGRESSING  2. Pt to be able to manage steps reciprocally with one railing. NO  3. Pt to state increased confidence in walking community distances PROGRESSING  4. Patellar pain R knee improved by 75%  PROGRESSING  5. Brace L knee for support as needed to prevent progression of OA NO    Progress toward previous goals: Partially Met    Recommendations: Continue as planned  Timeframe: 1 month  Prognosis to achieve goals: fair    PT Signature: Caroline Vasquez, PT  [place] License # [id number]    Based upon review of the patient's progress and continued therapy plan, it is my medical opinion that Jose Hurtado should continue physical therapy treatment at [facility]  [address] [place] [id number] [phone].    Signature: __________________________________  Brandin Bolton MD    Manual Therapy:    8     mins  33449;  Therapeutic Exercise:    25     mins  07822;     Neuromuscular Wil:        mins  37336;    Therapeutic Activity:     10     mins  27492;     Gait Training:           mins  77231;     Ultrasound:          mins  85868;    Electrical Stimulation:         mins  37501 ( );  Dry Needling          mins self-pay    Timed Treatment:   43   mins   Total Treatment:     43   mins

## 2022-12-05 ENCOUNTER — OFFICE VISIT (OUTPATIENT)
Dept: INTERNAL MEDICINE | Age: 72
End: 2022-12-05

## 2022-12-05 VITALS
SYSTOLIC BLOOD PRESSURE: 120 MMHG | WEIGHT: 227 LBS | HEART RATE: 74 BPM | OXYGEN SATURATION: 98 % | BODY MASS INDEX: 26.8 KG/M2 | TEMPERATURE: 97.3 F | DIASTOLIC BLOOD PRESSURE: 60 MMHG | HEIGHT: 77 IN

## 2022-12-05 DIAGNOSIS — R06.09 DYSPNEA ON EXERTION: Primary | ICD-10-CM

## 2022-12-05 DIAGNOSIS — R93.89 ABNORMAL CHEST CT: ICD-10-CM

## 2022-12-05 DIAGNOSIS — I50.9 CONGESTIVE HEART FAILURE, UNSPECIFIED HF CHRONICITY, UNSPECIFIED HEART FAILURE TYPE: ICD-10-CM

## 2022-12-05 DIAGNOSIS — J44.9 CHRONIC OBSTRUCTIVE PULMONARY DISEASE, UNSPECIFIED COPD TYPE: ICD-10-CM

## 2022-12-05 PROCEDURE — 99214 OFFICE O/P EST MOD 30 MIN: CPT | Performed by: INTERNAL MEDICINE

## 2022-12-05 RX ORDER — TIOTROPIUM BROMIDE INHALATION SPRAY 3.12 UG/1
2 SPRAY, METERED RESPIRATORY (INHALATION) DAILY
Qty: 4 G | Refills: 2 | Status: SHIPPED | OUTPATIENT
Start: 2022-12-05

## 2022-12-05 NOTE — PATIENT INSTRUCTIONS
** IMPORTANT MESSAGE FROM DR. HEALY **    In our office, your satisfaction is VERY important to us.     You may receive a survey from [employer] by mail or E-mail for you to provide feedback about your visit. This information is invaluable for me to know what we can do to improve our services.     I ask that you please take a few minutes to complete the survey and let us know how we are doing in serving your needs. (You may receive the survey more than once for multiple visits)    Thank You !    Dr. Healy    _________________________________________________________________________________________________________________________      ** ADDITIONAL INSTRUCTION / REMINDERS FROM DR. HEALY **    Increase furosemide to TWO tablets daily x 4 days and inform Dr. Healy of breathing/swelling status.    Simply Saline sinus irrigation twice daily.   Flonase nasal spray 2 sprays daily (OTC).  Add Allegra 180 mg (fexofenadine) daily.

## 2022-12-05 NOTE — PROGRESS NOTES
I N T E R N A L  M E D I C I N E    J U N O H  K I M,  M D      ENCOUNTER DATE:  12/05/2022    Jose FITCH Hurtado / 72 y.o. / male    CHIEF COMPLAINT / REASON FOR OFFICE VISIT     Shortness of Breath      ASSESSMENT & PLAN     Problem List Items Addressed This Visit        Unprioritized    COPD (chronic obstructive pulmonary disease) (HCC)    Relevant Medications    albuterol sulfate  (90 Base) MCG/ACT inhaler    tiotropium bromide monohydrate (Spiriva Respimat) 2.5 MCG/ACT aerosol solution inhaler   Other Visit Diagnoses     Dyspnea on exertion    -  Primary    Relevant Medications    tiotropium bromide monohydrate (Spiriva Respimat) 2.5 MCG/ACT aerosol solution inhaler    Other Relevant Orders    Basic Metabolic Panel    Abnormal chest CT        Congestive heart failure, unspecified HF chronicity, unspecified heart failure type (HCC)        Relevant Orders    proBNP        Orders Placed This Encounter   Procedures   • Basic Metabolic Panel   • proBNP   • Ambulatory Referral to Pulmonology     New Medications Ordered This Visit   Medications   • tiotropium bromide monohydrate (Spiriva Respimat) 2.5 MCG/ACT aerosol solution inhaler     Sig: Inhale 2 puffs Daily.     Dispense:  4 g     Refill:  2       SUMMARY/DISCUSSION  • Will refer him to pulmonologist for abnormal chronic appearance on CT of the chest along with progressive shortness of breath.  Suspect he may have COPD with possible interstitial lung disease.  • Start Spiriva Respimat 2.52 puffs daily and continue albuterol HFA inhaler as needed  • Increase furosemide 20 mg 2 tablets daily for 4 days to see if this helps with her breathing.  I advised him to update me on his status in 4 days.  • Check BMP and proBNP today      Next Appointment with me: Visit date not found    Return in about 2 months (around 2/5/2023) for Reassess chronic medical problems.      VITAL SIGNS     Vitals:    12/05/22 1123   BP: 120/60   Pulse: 74   Temp: 97.3 °F (36.3 °C)  "  SpO2: 98%   Weight: 103 kg (227 lb)   Height: 195 cm (76.77\")       BP Readings from Last 3 Encounters:   12/05/22 120/60   11/22/22 133/57   10/10/22 130/90     Wt Readings from Last 3 Encounters:   12/05/22 103 kg (227 lb)   11/22/22 99 kg (218 lb 3.2 oz)   10/10/22 107 kg (235 lb)     Body mass index is 27.08 kg/m².    Blood pressure readings recorded on patient flowsheet:  No flowsheet data found.       MEDICATIONS AT THE TIME OF OFFICE VISIT     Current Outpatient Medications on File Prior to Visit   Medication Sig   • albuterol sulfate  (90 Base) MCG/ACT inhaler Inhale 1 puff Every 4 (Four) Hours As Needed for Wheezing.   • atorvastatin (LIPITOR) 40 MG tablet Take 1 tablet by mouth Every Night.   • cyanocobalamin 1000 MCG/ML injection    • Eliquis 5 MG tablet tablet TAKE ONE TABLET BY MOUTH EVERY 12 HOURS   • furosemide (LASIX) 20 MG tablet Take 1 tablet by mouth Daily.   • Gemtesa 75 MG tablet Daily.   • metoprolol tartrate (LOPRESSOR) 25 MG tablet Take 1 tablet by mouth 2 (Two) Times a Day.   • pantoprazole (PROTONIX) 40 MG EC tablet TAKE ONE TABLET BY MOUTH TWICE A DAY BEFORE A MEAL   • PARoxetine (PAXIL) 40 MG tablet TAKE ONE TABLET BY MOUTH EVERY MORNING   • potassium chloride (MICRO-K) 10 MEQ CR capsule Take 1 capsule by mouth Daily.   • pramipexole (MIRAPEX) 1.5 MG tablet TAKE ONE TABLET BY MOUTH TWICE A DAY   • Syringe 25G X 1\" 3 ML misc Use along with B12 . 1 ml into the appropriate side of the muscle once every 30 days.     No current facility-administered medications on file prior to visit.          HISTORY OF PRESENT ILLNESS     Patient complains of several weeks history of progressive dyspnea with exertion without chest pain, PND/orthopnea or palpitation.  Denies melena or blood in the stool.  Previous non-ST elevation MI earlier this year.  There is documented history of COPD with prior history of smoking.  Prior CT of the chest in May 2022 showed findings suggestive of bronchitis, " groundglass appearance along with other chronic appearing changes.  He does have history of esophageal cancer and esophagectomy in 2015.  He has had history of DVT of the lower extremities and is maintained on Eliquis.      Patient Care Team:  Brandin Bolton MD as PCP - General (Internal Medicine)  Tapan, George THORPE II, MD as Consulting Physician (Hematology and Oncology)  Jose Inman MD as Consulting Physician (Urology)  Mulugeta Millan MD as Consulting Physician (Gastroenterology)  Jose Christine III, MD as Referring Physician (Thoracic Surgery)  Seth Randhawa MD as Consulting Physician (Ophthalmology)  James Cyr MD as Consulting Physician (Cardiology)    REVIEW OF SYSTEMS     Review of Systems   No fever or chills; no abnormal weight loss  No chest pain or palpitations   Denies PND/orthopnea; chronic bilateral lower extremities edema (unchanged)  No melena or blood in stool   Has chronic nonproductive cough ; denies wheezing     PHYSICAL EXAMINATION     Physical Exam  No acute distress   Alert with normal thought and judgment.   Heart rate is normal and regular   3+ bilateral lower extremities edema (reportedly chronic)  Lungs with faint bilateral inspiratory crackles at bases       REVIEWED DATA     Labs:     Lab Results   Component Value Date     05/31/2022    K 4.3 05/31/2022    CALCIUM 9.3 05/31/2022    AST 25 05/12/2022    ALT 19 05/12/2022    BUN 22 05/31/2022    CREATININE 1.48 (H) 05/31/2022    CREATININE 1.42 (H) 05/16/2022    CREATININE 1.32 (H) 05/15/2022    EGFRIFNONA 62 06/04/2021    EGFRIFAFRI 75 06/04/2021       Lab Results   Component Value Date    HGBA1C 5.50 08/06/2021    HGBA1C 6.20 (H) 01/17/2020       Lab Results   Component Value Date    LDL 98 06/02/2022    LDL 92 05/10/2018     (H) 01/30/2018    HDL 60 06/02/2022    HDL 45 05/10/2018    TRIG 53 06/02/2022    TRIG 79 05/10/2018       Lab Results   Component Value Date    TSH 2.710 01/17/2020    TSH  2.050 06/06/2019    TSH 3.440 02/13/2019    FREET4 1.52 01/17/2020    FREET4 1.18 06/06/2019    FREET4 1.33 02/13/2019       Lab Results   Component Value Date    WBC 4.10 11/22/2022    HGB 11.9 (L) 11/22/2022     11/22/2022       No results found for: MALBCRERATIO       Imaging:     CT ANGIOGRAM CHEST-, CT ABDOMEN PELVIS W CONTRAST-     CLINICAL HISTORY: Dyspnea. History of esophageal carcinoma. Status post  esophagectomy.     TECHNIQUE: Spiral CT images were obtained through the chest during rapid  IV injection of contrast and were reconstructed in 2 mm thick axial  slices. Subsequently images of the abdomen and pelvis were obtained with  IV contrast.     Radiation dose reduction techniques were utilized, including automated  exposure control and exposure modulation based on body size.     COMPARISON: CT imaging of the chest abdomen and pelvis dated 03/22/2022.     FINDINGS: The main pulmonary arteries and their lobar and segmental  branches are well opacified and demonstrate no filling defects. There is  no CT evidence of acute pulmonary thromboembolism. The thoracic aorta  was also well opacified and is unremarkable. The patient is status post  esophagectomy with gastric pull-through procedure. The pull-through  appears unremarkable and unchanged. There is no mediastinal or hilar or  axillary lymphadenopathy. Lung window images demonstrate multiple  curvilinear areas of abnormal increased density in the inferior aspect  of the right lung that are consistent with chronic fibrotic scarring.  These are unchanged. There are no discrete lung masses. There is mild  peribronchial thickening in both lower lung zones and also numerous  reticulonodular opacities scattered throughout both lungs that are new  since the preceding CT scan and are consistent with sequela of  bronchitis. Minimal groundglass opacities are also noted suspicious for  pneumonia, most prominent in the lingula. A tiny loculated left  pleural  effusion is also unchanged.     The liver, spleen and adrenal glands are unremarkable. There is a small  approximately 3 mm diameter nonobstructing lower pole left renal  calculus that appears essentially unchanged. The kidneys are otherwise  unremarkable. The neck of the pancreas is retracted superiorly into the  anterior aspect of the aortic hiatus due to the surgery. However, this  is new since the preceding CT scan dated 03/20/2022. The pancreas is  otherwise unremarkable. The small bowel and colon appear within normal  limits. No lymphadenopathy is identified in the abdomen or pelvis. The  prostate gland appears markedly atrophic or surgically absent.     IMPRESSION:  There is no CT evidence of acute pulmonary thromboembolism.  There is mild peribronchial thickening in both lower lung zones and also  patchy reticulonodular opacities in both lower lung zones that are new  since the preceding CT dated 03/20/2022 and are consistent with sequela  of bronchitis. Minimal patchy groundglass infiltrate is also present,  most prominent in the lingula. Postoperative changes as described. There  is no evidence of recurrent esophageal carcinoma or metastatic disease  within the chest abdomen or pelvis. There is a single tiny  nonobstructing lower pole left renal calculus. No acute process is  evident in the abdomen or pelvis.     This report was finalized on 5/12/2022       Medical Tests:     Echocardiogram 5/13/2022  • Estimated right ventricular systolic pressure from tricuspid regurgitation is mildly elevated (35-45 mmHg). Calculated right ventricular systolic pressure from tricuspid regurgitation is 43 mmHg.  • Left ventricular wall thickness is consistent with mild concentric hypertrophy.  • Estimated left ventricular EF = 56% Left ventricular systolic function is normal.  • Left ventricular diastolic function was normal.     Duplex venous lower extremities 10/10/2022:  •  Chronic left lower extremity deep  vein thrombosis noted in the popliteal and gastrocnemius.  •  All other left sided veins appeared normal.       Summary of old records / correspondence / consultant report:           Request outside records:             *Examiner was wearing [id number] mask and eye protection during the entire duration of the visit. Patient was masked the entire time. Minimum social distance of 6 ft maintained entire visit except if physical contact was necessary as documented.       Template created by Moose Bolton MD

## 2022-12-06 LAB
BUN SERPL-MCNC: 20 MG/DL (ref 8–27)
BUN/CREAT SERPL: 14 (ref 10–24)
CALCIUM SERPL-MCNC: 8.9 MG/DL (ref 8.6–10.2)
CHLORIDE SERPL-SCNC: 104 MMOL/L (ref 96–106)
CO2 SERPL-SCNC: 25 MMOL/L (ref 20–29)
CREAT SERPL-MCNC: 1.39 MG/DL (ref 0.76–1.27)
EGFRCR SERPLBLD CKD-EPI 2021: 53 ML/MIN/1.73
GLUCOSE SERPL-MCNC: 88 MG/DL (ref 70–99)
NT-PROBNP SERPL-MCNC: 2570 PG/ML (ref 0–376)
POTASSIUM SERPL-SCNC: 5 MMOL/L (ref 3.5–5.2)
SODIUM SERPL-SCNC: 140 MMOL/L (ref 134–144)

## 2022-12-06 NOTE — PROGRESS NOTES
CALL PATIENT with results:    ProBNP is high most likely due to heart failure. Take furosemide 20 mg two tablets daily as instructed. Followup with me or APRN in 1 week to reassess. Take furosemide two tablets daily until he is seen for followup.

## 2022-12-07 ENCOUNTER — TELEPHONE (OUTPATIENT)
Dept: INTERNAL MEDICINE | Age: 72
End: 2022-12-07

## 2022-12-12 NOTE — H&P
PCP: Brandin Bolton MD    Chief complaint   Chief Complaint   Patient presents with   • Weakness - Generalized   • Flu Symptoms       HPI  Patient is a 70 y.o. male presents with history of antiphospholipid syndrome/chronic anticoagulation/A. fib who presents to the ER with increasing shortness of air/weakness with fevers.  Patient started having some dyspnea on exertion about a week ago and he is now short of breath at rest.  He is generally weak and had fever and chills today.  He has had a productive milky white sputum.  Occasional wheezing which he normally does not do.  He says it is kind of difficult to take good deep breaths.  He started on Levaquin 4 days ago but has not seen any difference.  He is on a couple of inhalers at baseline but not oxygen.  Patient was here in January for CHF-the right pleural effusion was seen but now seems to be increasing.  Only received 1 g of Rocephin in the ER therefore getting another dose and will continue at 2 g    PAST MEDICAL HISTORY  Past Medical History:   Diagnosis Date   • Anemia    • Anti-phospholipid syndrome (CMS/HCC)     On lifelong anticoagulation therapy   • Bladder disorder     LEAKAGE   ON MED  wers pads   • Community acquired pneumonia     HISTORY OF IN 2014   • Deep venous thrombosis (CMS/HCC) 2006, 2008    Left lower extremity multiple   • Depression    • Esophageal carcinoma (CMS/HCC) 12/31/2014    had chemo and radiation prior surgery   • Hemorrhoids    • HH (hiatus hernia)    • History of atrial fibrillation 2015    ONE EPISODE WHILE HOSPITALIZED   • History of kidney stones    • History of nephrolithiasis    • History of pancreatitis     PT STATES MANY YEARS AGO   • History of radiation therapy    • Hypertension    • Long-term (current) use of anticoagulants, INR goal 2.0-3.0    • Lymphedema    • Malignant neoplasm of prostate (CMS/HCC)    • Other hyperlipidemia 1/30/2018 January 30, 2018 lipid panel risk 12.8%   • Restless legs syndrome    •  Shortness of breath    • 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  He also has a history of DVT in the left leg and takes Coumadin.    PAST SURGICAL HISTORY  Past Surgical History:   Procedure Laterality Date   • APPENDECTOMY  1950   • BRONCHOSCOPY      (Diagnostic)   • CATARACT EXTRACTION Bilateral 2014   • COLONOSCOPY  12/15/2014    Complete / Description: EH, IH, torts, stool, follow-up colonoscopy due in 5 years.   • COLONOSCOPY N/A 6/13/2017    non-thrombosed external hemorrhoids, normal examined ileum, IH   • COLONOSCOPY N/A 2/26/2019    Procedure: COLONOSCOPY with Cold Polypectomy;  Surgeon: Mulugeta Millan MD;  Location: [facility] ENDOSCOPY;  Service: Gastroenterology   • CYSTOSCOPY W/ LASER LITHOTRIPSY     • ENDOSCOPY N/A 6/13/2017    Procedure: ESOPHAGOGASTRODUODENOSCOPY WITH COLD BIOPSY;  Surgeon: Lake Gonzalez MD;  Location: [facility] ENDOSCOPY;  Service:    • ESOPHAGECTOMY      April 2015, stage IIB esophageal carcinoma, sub-total resection.   • ESOPHAGECTOMY      Esophagectomy Subtotal Andrea Joe Procedure   • EXCISION LESION  08/2012    Removal of Squamous Cell CA on Head   • HAMMER TOE REPAIR  09/2014    Hammertoe Operation (Each Toe), 10/2014   • HAMMER TOE REPAIR Left 10/3/2017    Procedure: Left second third and fourth distal interphalangeal joint resection with flexor tenotomy;  Surgeon: Mulugeta Lira MD;  Location: [facility] OR [facility];  Service:    • HERNIA REPAIR      incisional   • JEJUNOSTOMY      Laparoscopic   • JEJUNOSTOMY      tube removal    • KNEE SURGERY Bilateral 1967, 1973, 1981   • PATELLA SURGERY Left     removed   • PILONIDAL CYST / SINUS EXCISION     • PA TOTAL KNEE ARTHROPLASTY Right 3/26/2018    Procedure: TOTAL KNEE ARTHROPLASTY;  Surgeon: Renny Solis MD;  Location: [facility] MAIN OR;  Service: Orthopedics   • PROSTATECTOMY  2010   • PYLOROPLASTY     • SPINAL FUSION  02/1998    C 5,6   • TONSILLECTOMY  1955   • TONSILLECTOMY      • UPPER GASTROINTESTINAL ENDOSCOPY  12/15/2014    LA Grade D esophagitis, Pardo's, HH, multiple duodenal ulcers   • VENTRAL/INCISIONAL HERNIA REPAIR N/A 2016    Procedure: VENTRAL/INCISIONAL HERNIA REPAIR, open, with mesh, and component separation;  Surgeon: Darren Rivas MD;  Location: [facility] MAIN OR;  Service:        FAMILY HISTORY  Family History   Problem Relation Age of Onset   • Cerebral aneurysm Mother         cerebral artery aneurysm ( age 56)   • Prostate cancer Brother 68   • Anxiety disorder Father    • Suicide Attempts Father          of suicide   • Cancer Father         bladder   • No Known Problems Brother    • Colon cancer Neg Hx    • Esophageal cancer Neg Hx    • Dementia Neg Hx        SOCIAL HISTORY  Social History     Tobacco Use   • Smoking status: Former Smoker     Packs/day: 2.00     Years: 3.00     Pack years: 6.00     Types: Cigarettes     Last attempt to quit: 1974     Years since quittin.1   • Smokeless tobacco: Never Used   • Tobacco comment: no caffeine    Substance Use Topics   • Alcohol use: Yes     Frequency: 2-3 times a week     Comment: 2-3 x per week   • Drug use: No       MEDICATIONS:  Facility-Administered Medications Prior to Admission   Medication Dose Route Frequency Provider Last Rate Last Dose   • cyanocobalamin injection 1,000 mcg  1,000 mcg Intramuscular Q28 Days Brandin Bolton MD   1,000 mcg at 20 1109     Medications Prior to Admission   Medication Sig Dispense Refill Last Dose   • albuterol sulfate  (90 Base) MCG/ACT inhaler    Taking   • furosemide (LASIX) 20 MG tablet Take 1 tablet by mouth Daily. 30 tablet 0 Taking   • PARoxetine (PAXIL) 40 MG tablet Take 1 tablet by mouth Every Morning. 30 tablet 3 Taking   • potassium chloride (K-DUR) 10 MEQ CR tablet Take 1 tablet by mouth Daily. Only while taking furosemide. 30 tablet 0 Taking   • pramipexole (MIRAPEX) 1.5 MG tablet Take 1 tablet by mouth 2 (Two) Times a Day. 180 tablet 1  Regular Diet - No restrictions/DASH Diet/Low Sodium Diet/Consistent Carbohydrate Diabetic Diets "Taking   • STIOLTO RESPIMAT 2.5-2.5 MCG/ACT aerosol solution inhaler    Taking   • tamsulosin (FLOMAX) 0.4 MG capsule 24 hr capsule Take 1 capsule by mouth Every Night.   Taking   • traZODone (DESYREL) 100 MG tablet Take 1 tablet by mouth Every Night for 180 days. 30 tablet 5 Taking   • warfarin (COUMADIN) 5 MG tablet 2.5 mg Monday, Wednesday, Friday, Sunday ; 5 mg on Tuesday, Thursday, Saturday 90 tablet 2 Taking   • ferrous sulfate 325 (65 FE) MG tablet TAKE ONE TABLET BY MOUTH DAILY WITH BREAKFAST (Patient taking differently: TAKE TWO TABLET BY MOUTH DAILY WITH BREAKFAST) 90 tablet 0 Taking   • metoprolol tartrate (LOPRESSOR) 25 MG tablet Take 1 tablet by mouth Every 12 (Twelve) Hours. 60 tablet 0 Taking       Allergies:  Patient has no known allergies.    Review of Systems:  +chills, fevers, fatigue   cough, pleuritic pain, shortness of air  Chronic indwelling King and chronic lymphedema (at baseline) [place]  Negative for following (except as per HPI):  Constitution:   Eyes: change of vision, loss of vision and discharge  ENT: ear drainage, ear ringing and facial trauma  Respiratory:  Cardiovascular: chest pressure, pain, lower extremity edema, palpitations  Gastrointestinal: constipation, diarrhea, nausea, vomiting, pain    Integument: rash and wound  Hematologic / Lymphatic: excessive bleeding and easy bruising  Musculoskeletal: joint pain, joint stiffness, joint swelling and muscle pain  Neurological: headaches, numbness, seizures and tremors  Behavioral / Psych: anxiety, depression and hallucinations         Vital Signs  Temp:  [98.3 °F (36.8 °C)-101.6 °F (38.7 °C)] 98.3 °F (36.8 °C)  Heart Rate:  [72-86] 72  Resp:  [18-22] 18  BP: (107-175)/(53-84) 110/53  Flowsheet Rows      First Filed Value   Admission Height  195.6 cm (77\") Documented at 02/10/2020 1304   Admission Weight  93 kg (205 lb) Documented at 02/10/2020 1304         Body mass index is 24.31 kg/m².    Physical Exam:  General Appearance:    Alert, " cooperative, in no acute distress; [facility]   Head:    Normocephalic, without obvious abnormality, atraumatic   Eyes:         conjunctivae and sclerae normal, no icterus, PERRLA   ENT:    Ears grossly intact, oral mucosa moist,   Neck:   No adenopathy, supple, trachea midline,    Back:     Normal to inspection, range of motion normal   Lungs:    Decreased breath sounds in the bases right greater than left, occasional rhonchi, no wheezing appreciated but just received a nebulizer treatment.  Tachypneic but even and                   Mildly labored    Heart:    Regular rhythm and normal rate,  no murmur, normal S1 and S2,   Abdomen:     Normal bowel sounds, no masses,  soft non-tender, non-distended, chronic King   Extremities:   Moves all extremities well, no cyanosis, 2-3+edema,             Pulses:   Pulses palpable and equal bilaterally   Skin:   No bleeding, rash, bruising    Neurologic:    Psych:   Cranial nerves 2 - 12 grossly intact, sensation grossly intact,     Moves all extremities well, equal bilateral strength 4/5    Alert and Oriented x 3, Normal Affect   LABS:  Admission on 02/10/2020   Component Date Value Ref Range Status   • Glucose 02/10/2020 98  65 - 99 mg/dL Final   • BUN 02/10/2020 22  8 - 23 mg/dL Final   • Creatinine 02/10/2020 1.23  0.76 - 1.27 mg/dL Final   • Sodium 02/10/2020 141  136 - 145 mmol/L Final   • Potassium 02/10/2020 4.8  3.5 - 5.2 mmol/L Final   • Chloride 02/10/2020 104  98 - 107 mmol/L Final   • CO2 02/10/2020 26.0  22.0 - 29.0 mmol/L Final   • Calcium 02/10/2020 9.0  8.6 - 10.5 mg/dL Final   • Total Protein 02/10/2020 7.3  6.0 - 8.5 g/dL Final   • Albumin 02/10/2020 3.70  3.50 - 5.20 g/dL Final   • ALT (SGPT) 02/10/2020 23  1 - 41 U/L Final   • AST (SGOT) 02/10/2020 36  1 - 40 U/L Final   • Alkaline Phosphatase 02/10/2020 112  39 - 117 U/L Final   • Total Bilirubin 02/10/2020 0.3  0.2 - 1.2 mg/dL Final   • eGFR Non African Amer 02/10/2020 58* >60 mL/min/1.73 Final   • Globulin  02/10/2020 3.6  gm/dL Final   • A/G Ratio 02/10/2020 1.0  g/dL Final   • BUN/Creatinine Ratio 02/10/2020 17.9  7.0 - 25.0 Final   • Anion Gap 02/10/2020 11.0  5.0 - 15.0 mmol/L Final   • Lactate 02/10/2020 1.0  0.5 - 2.0 mmol/L Final   • WBC 02/10/2020 4.71  3.40 - 10.80 10*3/mm3 Final   • RBC 02/10/2020 3.47* 4.14 - 5.80 10*6/mm3 Final   • Hemoglobin 02/10/2020 10.1* 13.0 - 17.7 g/dL Final   • Hematocrit 02/10/2020 31.2* 37.5 - 51.0 % Final   • MCV 02/10/2020 89.9  79.0 - 97.0 fL Final   • MCH 02/10/2020 29.1  26.6 - 33.0 pg Final   • MCHC 02/10/2020 32.4  31.5 - 35.7 g/dL Final   • RDW 02/10/2020 14.1  12.3 - 15.4 % Final   • RDW-SD 02/10/2020 46.3  37.0 - 54.0 fl Final   • MPV 02/10/2020 8.9  6.0 - 12.0 fL Final   • Platelets 02/10/2020 198  140 - 450 10*3/mm3 Final   • Neutrophil % 02/10/2020 80.9* 42.7 - 76.0 % Final   • Lymphocyte % 02/10/2020 12.3* 19.6 - 45.3 % Final   • Monocyte % 02/10/2020 4.9* 5.0 - 12.0 % Final   • Eosinophil % 02/10/2020 1.5  0.3 - 6.2 % Final   • Basophil % 02/10/2020 0.2  0.0 - 1.5 % Final   • Immature Grans % 02/10/2020 0.2  0.0 - 0.5 % Final   • Neutrophils, Absolute 02/10/2020 3.81  1.70 - 7.00 10*3/mm3 Final   • Lymphocytes, Absolute 02/10/2020 0.58* 0.70 - 3.10 10*3/mm3 Final   • Monocytes, Absolute 02/10/2020 0.23  0.10 - 0.90 10*3/mm3 Final   • Eosinophils, Absolute 02/10/2020 0.07  0.00 - 0.40 10*3/mm3 Final   • Basophils, Absolute 02/10/2020 0.01  0.00 - 0.20 10*3/mm3 Final   • Immature Grans, Absolute 02/10/2020 0.01  0.00 - 0.05 10*3/mm3 Final   • nRBC 02/10/2020 0.0  0.0 - 0.2 /100 WBC Final   • Extra Tube 02/10/2020 Hold for add-ons.   Final    Auto resulted.   • Extra Tube 02/10/2020 hold for add-on   Final    Auto resulted   • Extra Tube 02/10/2020 Hold for add-ons.   Final    Auto resulted.   • Protime 02/10/2020 31.2* 11.7 - 14.2 Seconds Final   • INR 02/10/2020 3.04* 0.90 - 1.10 Final   • Color, UA 02/10/2020 Yellow  Yellow, Straw Final   • Appearance,  02/10/2020  Clear  Clear Final   • pH, UA 02/10/2020 6.5  5.0 - 8.0 Final   • Specific Gravity, UA 02/10/2020 1.014  1.005 - 1.030 Final   • Glucose, UA 02/10/2020 Negative  Negative Final   • Ketones, UA 02/10/2020 Negative  Negative Final   • Bilirubin, UA 02/10/2020 Negative  Negative Final   • Blood, UA 02/10/2020 Small (1+)* Negative Final   • Protein, UA 02/10/2020 Negative  Negative Final   • Leuk Esterase, UA 02/10/2020 Small (1+)* Negative Final   • Nitrite, UA 02/10/2020 Negative  Negative Final   • Urobilinogen, UA 02/10/2020 0.2 E.U./dL  0.2 - 1.0 E.U./dL Final   • ADENOVIRUS, PCR 02/10/2020 Not Detected  Not Detected Final   • Coronavirus 229E 02/10/2020 Not Detected  Not Detected Final   • Coronavirus HKU1 02/10/2020 Not Detected  Not Detected Final   • Coronavirus NL63 02/10/2020 Not Detected  Not Detected Final   • Coronavirus OC43 02/10/2020 Not Detected  Not Detected Final   • Human Metapneumovirus 02/10/2020 Not Detected  Not Detected Final   • Human Rhinovirus/Enterovirus 02/10/2020 Not Detected  Not Detected Final   • Influenza B PCR 02/10/2020 Not Detected  Not Detected Final   • Parainfluenza Virus 1 02/10/2020 Not Detected  Not Detected Final   • Parainfluenza Virus 2 02/10/2020 Not Detected  Not Detected Final   • Parainfluenza Virus 3 02/10/2020 Not Detected  Not Detected Final   • Parainfluenza Virus 4 02/10/2020 Not Detected  Not Detected Final   • Bordetella pertussis pcr 02/10/2020 Not Detected  Not Detected Final   • Influenza A H1 2009 PCR 02/10/2020 Not Detected  Not Detected Final   • Chlamydophila pneumoniae PCR 02/10/2020 Not Detected  Not Detected Final   • Mycoplasma pneumo by PCR 02/10/2020 Not Detected  Not Detected Final   • Influenza A PCR 02/10/2020 Not Detected  Not Detected Final   • Influenza A H3 02/10/2020 Not Detected  Not Detected Final   • Influenza A H1 02/10/2020 Not Detected  Not Detected Final   • RSV, PCR 02/10/2020 Not Detected  Not Detected Final   • Bordetella  parapertussis PCR 02/10/2020 Not Detected  Not Detected Final   • RBC, UA 02/10/2020 0-2  None Seen, 0-2 /HPF Final   • WBC, UA 02/10/2020 6-12* None Seen, 0-2 /HPF Final   • Bacteria, UA 02/10/2020 None Seen  None Seen /HPF Final   • Squamous Epithelial Cells, UA 02/10/2020 0-2  None Seen, 0-2 /HPF Final   • Hyaline Casts, UA 02/10/2020 None Seen  None Seen /LPF Final   • Methodology 02/10/2020 Manual Light Microscopy   Final       DIAGNOSTICS:  Xr Chest 2 View    Result Date: 2/10/2020  Persistent right pleural effusion. Increased opacity at the right lower lung suggesting atelectasis/infiltrate, follow-up recommended.  This report was finalized on 2/10/2020 1:48 PM by Dr. Sumanth Boyer M.D.             Results Review:   I reviewed the patient's new clinical results.  Discussed with ER physician  I personally viewed and interpreted the patient's EKG/Telemetry data sinus rhythm with PVCs  Old records reviewed history of iron deficiency anemia     ASSESSMENT AND PLAN      ·  Pneumonia of right lower lobe due to infectious organism with pleural effusion  -cxr Increasing opacity in the right lower lung.  --Bld cultures,  sputum cultures, IV antibiotics Rocephin and azithromycin  -Incentive spirometer  -Recommend Follow-up Chest X-ray  -Consult pulmonary as they saw him last month  -Chest PT    · Pleural effusion-echo last month showed an EF of 72%  -Continue medical management  -Patient on low-dose diuretics  -Supplemental oxygen PRN    ·   Lymphedema  -Consult OT,   -iv pain meds prn    ·     Anti-phospholipid syndrome /Long-term (current) use of anticoagulants, INR goal 2.0-3.0  -Stable, continue Coumadin-monitor INR and adjust as needed the azithromycin will interact and     ·   RLS (restless legs syndrome)  -on  Mirapex and continue-  (Dose was initially decreased )    · Chronic King   -continue catheter care    ·   Postsurgical malabsorption/ Iron deficiency/ Hypertension/ Peripheral  polyneuropathy  -Stable, continue medical management    +DVT proph: scd's  + Full code    I discussed the patients findings and my recommendations with the patient and/or family.  Please reference all orders placed.    Fiona Barba MD  02/10/20  6:47 PM

## 2022-12-14 ENCOUNTER — TELEPHONE (OUTPATIENT)
Dept: PHYSICAL THERAPY | Facility: CLINIC | Age: 72
End: 2022-12-14

## 2022-12-16 ENCOUNTER — TREATMENT (OUTPATIENT)
Dept: PHYSICAL THERAPY | Facility: CLINIC | Age: 72
End: 2022-12-16

## 2022-12-16 ENCOUNTER — OFFICE VISIT (OUTPATIENT)
Dept: INTERNAL MEDICINE | Age: 72
End: 2022-12-16

## 2022-12-16 VITALS
WEIGHT: 227 LBS | HEIGHT: 77 IN | OXYGEN SATURATION: 97 % | TEMPERATURE: 98.4 F | DIASTOLIC BLOOD PRESSURE: 54 MMHG | BODY MASS INDEX: 26.8 KG/M2 | HEART RATE: 63 BPM | SYSTOLIC BLOOD PRESSURE: 112 MMHG

## 2022-12-16 DIAGNOSIS — R29.898 WEAKNESS OF BOTH LEGS: Primary | ICD-10-CM

## 2022-12-16 DIAGNOSIS — R29.898 WEAKNESS OF BOTH HIPS: ICD-10-CM

## 2022-12-16 DIAGNOSIS — R06.02 SHORTNESS OF BREATH ON EXERTION: Primary | ICD-10-CM

## 2022-12-16 DIAGNOSIS — M25.662 KNEE JOINT STIFFNESS, BILATERAL: ICD-10-CM

## 2022-12-16 DIAGNOSIS — M25.661 KNEE JOINT STIFFNESS, BILATERAL: ICD-10-CM

## 2022-12-16 DIAGNOSIS — J44.9 CHRONIC OBSTRUCTIVE PULMONARY DISEASE, UNSPECIFIED COPD TYPE: ICD-10-CM

## 2022-12-16 PROCEDURE — 99213 OFFICE O/P EST LOW 20 MIN: CPT | Performed by: NURSE PRACTITIONER

## 2022-12-16 PROCEDURE — 97140 MANUAL THERAPY 1/> REGIONS: CPT | Performed by: PHYSICAL THERAPIST

## 2022-12-16 RX ORDER — FUROSEMIDE 80 MG
80 TABLET ORAL DAILY
Qty: 30 TABLET | Refills: 0 | Status: SHIPPED | OUTPATIENT
Start: 2022-12-16 | End: 2022-12-21 | Stop reason: SDUPTHER

## 2022-12-16 NOTE — PROGRESS NOTES
I N T E R N A L  M E D I C I N E  Halima Ty, APRN    ENCOUNTER DATE:  12/16/2022    Jose FITCH Hurtado / 72 y.o. / male      CHIEF COMPLAINT / REASON FOR OFFICE VISIT     Follow-up (Follow up from last appt with jeny, lasix are not helping with the swelling )      ASSESSMENT & PLAN     Diagnoses and all orders for this visit:    1. Shortness of breath on exertion (Primary)  2. Congestive heart failure, unspecified HF chroniciity , unspecified heart failure type (HCC)           - proBNP  -     Basic metabolic panel       - continue Albuterol sulfate HFA as directed and Spiriva Respimat daily     3. Chronic obstructive pulmonary disease, unspecified COPD type (HCC)    Other orders  -     furosemide (Lasix) 80 MG tablet; Take 1 tablet by mouth Daily for 30 days.  Dispense: 30 tablet; Refill: 0    SUMMARY/DISCUSSION  Instructed to cut down on the number of soft drinks he drinks daily and low sodium diet. Encouraged to drink more water than sodas. Discussion of possible CHF and increased lasix to 80 mg daily. Encouraged him to elevate his legs while sitting. Encouraged him to follow up with his Cardiologist, Dr Maldonado, sooner than later. Would recommend a repeat ECHO  • Discussion of COPD and to continue his inhalers as directed and states has an upcoming appt with Dr Sommers   • Follow up with Dr Bolton as scheduled and as needed  • I spent 25 min in direct care of this patient on this date of service. This time includes times spent by me in the following activities: Preparing for the visit, obtaining and/or reviewing a separately obtained history, performing a medically appropriate examination and/or evaluation, reviewing medical records, reviewing tests, ordering medications, tests, or procedures, counseling and educating the patient, documenting information in the medical record and reviewing office note/correspondence from other providers.     Return in about 1 week (around 12/23/2022) for Next scheduled follow  "up.      VITAL SIGNS     Visit Vitals  /54 (BP Location: Left arm, Patient Position: Sitting, Cuff Size: Adult)   Pulse 63   Temp 98.4 °F (36.9 °C) (Temporal)   Ht 195 cm (76.77\")   Wt 103 kg (227 lb)   SpO2 97%   BMI 27.08 kg/m²           BP Readings from Last 3 Encounters:   12/16/22 112/54   12/05/22 120/60   11/22/22 133/57     Wt Readings from Last 3 Encounters:   12/16/22 103 kg (227 lb)   12/05/22 103 kg (227 lb)   11/22/22 99 kg (218 lb 3.2 oz)     Body mass index is 27.08 kg/m².    Blood pressure readings recorded on patient flowsheet:  No flowsheet data found.          MEDICATIONS AT THE TIME OF OFFICE VISIT     Current Outpatient Medications on File Prior to Visit   Medication Sig Dispense Refill   • albuterol sulfate  (90 Base) MCG/ACT inhaler Inhale 1 puff Every 4 (Four) Hours As Needed for Wheezing.     • atorvastatin (LIPITOR) 40 MG tablet Take 1 tablet by mouth Every Night. 90 tablet 3   • cyanocobalamin 1000 MCG/ML injection      • Eliquis 5 MG tablet tablet TAKE ONE TABLET BY MOUTH EVERY 12 HOURS 60 tablet 1   • Gemtesa 75 MG tablet Daily.     • metoprolol tartrate (LOPRESSOR) 25 MG tablet Take 1 tablet by mouth 2 (Two) Times a Day. 180 tablet 3   • pantoprazole (PROTONIX) 40 MG EC tablet TAKE ONE TABLET BY MOUTH TWICE A DAY BEFORE A MEAL 180 tablet 3   • PARoxetine (PAXIL) 40 MG tablet TAKE ONE TABLET BY MOUTH EVERY MORNING 30 tablet 5   • potassium chloride (MICRO-K) 10 MEQ CR capsule Take 1 capsule by mouth Daily. 90 capsule 5   • pramipexole (MIRAPEX) 1.5 MG tablet TAKE ONE TABLET BY MOUTH TWICE A  tablet 1   • Syringe 25G X 1\" 3 ML misc Use along with B12 . 1 ml into the appropriate side of the muscle once every 30 days. 50 each 1   • tiotropium bromide monohydrate (Spiriva Respimat) 2.5 MCG/ACT aerosol solution inhaler Inhale 2 puffs Daily. 4 g 2   • [DISCONTINUED] furosemide (LASIX) 20 MG tablet Take 1 tablet by mouth Daily. 90 tablet 3     No current " facility-administered medications on file prior to visit.        HISTORY OF PRESENT ILLNESS     72 year old male being seen today for complaint of shortness of breath with exertion. States feels fatigued and short of breath with ambulation but resolves with resting. Patient denies any chest pain, palpations. Patient remains with bilateral lymphedema 3+ and states has been taking lasix as directed. States that he feels that his bladder is emptying but not having to go urgently. Weight unchanged today.       Patient Care Team:  Brandin Bolton MD as PCP - General (Internal Medicine)  George Phelan II, MD as Consulting Physician (Hematology and Oncology)  Jose Inman MD as Consulting Physician (Urology)  Mulugeta Millan MD as Consulting Physician (Gastroenterology)  Jose Christine III, MD as Referring Physician (Thoracic Surgery)  Seth Randhawa MD as Consulting Physician (Ophthalmology)  James Cyr MD as Consulting Physician (Cardiology)    REVIEW OF SYSTEMS     Review of Systems   Constitutional: Positive for activity change. Negative for fever.   HENT: Negative.    Respiratory: Positive for shortness of breath.    Cardiovascular: Positive for leg swelling. Negative for chest pain and palpitations.        Hx Lymphedema with kamran 2-3+ edema   Gastrointestinal: Negative for abdominal distention.   Genitourinary: Negative.    Musculoskeletal: Positive for arthralgias.   Skin: Negative.    Neurological: Negative for dizziness and headaches.   Psychiatric/Behavioral: Negative.           PHYSICAL EXAMINATION     Physical Exam  Cardiovascular:      Rate and Rhythm: Normal rate and regular rhythm.      Pulses: Normal pulses.      Heart sounds: Normal heart sounds.   Pulmonary:      Effort: Pulmonary effort is normal. No respiratory distress.      Breath sounds: Normal breath sounds. No wheezing, rhonchi or rales.   Chest:      Chest wall: No tenderness.   Abdominal:      General: Bowel sounds are  normal.      Palpations: Abdomen is soft.   Musculoskeletal:         General: No tenderness.      Right lower leg: Edema present.      Left lower leg: Edema present.   Skin:     General: Skin is warm and dry.   Neurological:      Mental Status: He is alert and oriented to person, place, and time.             REVIEWED DATA     Labs:     Lab Results   Component Value Date     12/05/2022    K 5.0 12/05/2022    CALCIUM 8.9 12/05/2022    AST 25 05/12/2022    ALT 19 05/12/2022    BUN 20 12/05/2022    CREATININE 1.39 (H) 12/05/2022    CREATININE 1.48 (H) 05/31/2022    CREATININE 1.42 (H) 05/16/2022    EGFRIFNONA 62 06/04/2021    EGFRIFAFRI 75 06/04/2021       Lab Results   Component Value Date    HGBA1C 5.50 08/06/2021    HGBA1C 6.20 (H) 01/17/2020       Lab Results   Component Value Date    LDL 98 06/02/2022    LDL 92 05/10/2018     (H) 01/30/2018    HDL 60 06/02/2022    HDL 45 05/10/2018    TRIG 53 06/02/2022    TRIG 79 05/10/2018       Lab Results   Component Value Date    TSH 2.710 01/17/2020    TSH 2.050 06/06/2019    TSH 3.440 02/13/2019    FREET4 1.52 01/17/2020    FREET4 1.18 06/06/2019    FREET4 1.33 02/13/2019       Lab Results   Component Value Date    WBC 4.10 11/22/2022    HGB 11.9 (L) 11/22/2022     11/22/2022       No results found for: MALBCRERATIO       Imaging:           Medical Tests:           Summary of old records / correspondence / consultant report:           Request outside records:           FAYE Singer

## 2022-12-16 NOTE — PROGRESS NOTES
Physical Therapy Daily Progress Note        Subjective     Jose Hurtado reports: I have not been doing very well. Getting very short of breath any type of exertion, including HEP and especially with walking  Pt arrived 13 minutes late    Objective   See Exercise, Manual, and Modality Logs for complete treatment.       Assessment/Plan  Pt reported soreness in lower abdomen after prone treatment. Flet better after heat for 10 minutes. Will hold on PT until after patient sees pulmonary MD next wee. Pt got tired and SOB with trying to perform hip extensions prone so DC'd after 3 reps    Progress per Plan of Care           Manual Therapy:  12       mins  92464;  Therapeutic Exercise: 8      mins  56939;     Neuromuscular Wil:       mins  75828;    Therapeutic Activity:         mins  84828;     Gait Training:         mins  71056;     Ultrasound:         mins  55037;    Electrical Stimulation:        mins  39394 ( );  Dry Needling         mins self-pay    Timed Treatment:   20   mins   Total Treatment:     30   mins    Caroline Vasquez, PT  Physical Therapist  [place] License # [id number]

## 2022-12-17 LAB
BUN SERPL-MCNC: 26 MG/DL (ref 8–23)
BUN/CREAT SERPL: 17.2 (ref 7–25)
CALCIUM SERPL-MCNC: 8.6 MG/DL (ref 8.6–10.5)
CHLORIDE SERPL-SCNC: 110 MMOL/L (ref 98–107)
CO2 SERPL-SCNC: 28.9 MMOL/L (ref 22–29)
CREAT SERPL-MCNC: 1.51 MG/DL (ref 0.76–1.27)
EGFRCR SERPLBLD CKD-EPI 2021: 48.2 ML/MIN/1.73
GLUCOSE SERPL-MCNC: 102 MG/DL (ref 65–99)
NT-PROBNP SERPL-MCNC: 3222 PG/ML (ref 0–376)
POTASSIUM SERPL-SCNC: 4.8 MMOL/L (ref 3.5–5.2)
SODIUM SERPL-SCNC: 146 MMOL/L (ref 136–145)

## 2022-12-19 ENCOUNTER — TELEPHONE (OUTPATIENT)
Dept: CARDIOLOGY | Facility: CLINIC | Age: 72
End: 2022-12-19

## 2022-12-19 DIAGNOSIS — N18.9 ACUTE RENAL FAILURE SUPERIMPOSED ON CHRONIC KIDNEY DISEASE, UNSPECIFIED CKD STAGE, UNSPECIFIED ACUTE RENAL FAILURE TYPE: Primary | ICD-10-CM

## 2022-12-19 DIAGNOSIS — N17.9 ACUTE RENAL FAILURE SUPERIMPOSED ON CHRONIC KIDNEY DISEASE, UNSPECIFIED CKD STAGE, UNSPECIFIED ACUTE RENAL FAILURE TYPE: Primary | ICD-10-CM

## 2022-12-19 NOTE — TELEPHONE ENCOUNTER
Former [facility] pt/ transitioning care to [facility] with next scheduled appt 3/24/23. 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      Patient saw PCP, Halima MCKINNEY, 12/16 for SOA on exertion resolving with rest and BLE edema, noted to be 3+ on exam there.  He does not weigh himself at home.  Denies CP and palpitations.     BNP and BMP drawn at that time, results in Epic. Patient instructed to call us for an appointment regarding above symptoms and elevated BNP- 3,222 and concern for possible CHF.  PCP also referred to nephrology for acute renal failure superimposed on chronic renal failure.      PCP increased lasix on 12/16 from 20 mg daily to 80 mg daily.  Pt reports it has helped the swelling and his breathing.  He is voiding a lot more.    meds reviewed with pt:  Lasix 80 mg daily  Potassium 10 meq daily  Metoprolol 25 mg BID  eliquis 5 mg BID    Patient agreeable to appt this week.  .Asked the patient to continue with increased lasix dose from PCP until appt.      I have scheduled the patient with AL this Wednesday 12/21.  KN patient saw you last in September, is there anything you want to add or change since AL is not in office today?    Thanks all,    Ruba Solis RN  [facility] T[facility]  12/19/22 10:13 EST

## 2022-12-21 ENCOUNTER — HOSPITAL ENCOUNTER (OUTPATIENT)
Dept: CARDIOLOGY | Facility: HOSPITAL | Age: 72
Discharge: HOME | End: 2022-12-21
Admitting: NURSE PRACTITIONER

## 2022-12-21 ENCOUNTER — OFFICE VISIT (OUTPATIENT)
Dept: CARDIOLOGY | Facility: CLINIC | Age: 72
End: 2022-12-21

## 2022-12-21 ENCOUNTER — TELEPHONE (OUTPATIENT)
Dept: CARDIOLOGY | Facility: CLINIC | Age: 72
End: 2022-12-21

## 2022-12-21 VITALS
HEART RATE: 64 BPM | HEIGHT: 77 IN | WEIGHT: 224 LBS | SYSTOLIC BLOOD PRESSURE: 110 MMHG | DIASTOLIC BLOOD PRESSURE: 50 MMHG | BODY MASS INDEX: 26.45 KG/M2

## 2022-12-21 DIAGNOSIS — N18.32 STAGE 3B CHRONIC KIDNEY DISEASE: ICD-10-CM

## 2022-12-21 DIAGNOSIS — I89.0 LYMPHEDEMA: Primary | ICD-10-CM

## 2022-12-21 DIAGNOSIS — N17.9 AKI (ACUTE KIDNEY INJURY): ICD-10-CM

## 2022-12-21 DIAGNOSIS — Z79.01 CHRONIC ANTICOAGULATION: ICD-10-CM

## 2022-12-21 DIAGNOSIS — Z51.81 ENCOUNTER FOR THERAPEUTIC DRUG LEVEL MONITORING: Primary | ICD-10-CM

## 2022-12-21 DIAGNOSIS — I49.3 PVC'S (PREMATURE VENTRICULAR CONTRACTIONS): ICD-10-CM

## 2022-12-21 DIAGNOSIS — R06.09 DOE (DYSPNEA ON EXERTION): ICD-10-CM

## 2022-12-21 DIAGNOSIS — Z51.81 ENCOUNTER FOR THERAPEUTIC DRUG LEVEL MONITORING: ICD-10-CM

## 2022-12-21 DIAGNOSIS — N18.31 STAGE 3A CHRONIC KIDNEY DISEASE: ICD-10-CM

## 2022-12-21 LAB
ALBUMIN SERPL-MCNC: 4.1 G/DL (ref 3.5–5.2)
ALBUMIN/GLOB SERPL: 1.3 G/DL
ALP SERPL-CCNC: 128 U/L (ref 39–117)
ALT SERPL W P-5'-P-CCNC: 23 U/L (ref 1–41)
ANION GAP SERPL CALCULATED.3IONS-SCNC: 9.1 MMOL/L (ref 5–15)
AST SERPL-CCNC: 22 U/L (ref 1–40)
BILIRUB SERPL-MCNC: 0.4 MG/DL (ref 0–1.2)
BUN SERPL-MCNC: 33 MG/DL (ref 8–23)
BUN/CREAT SERPL: 19.2 (ref 7–25)
CALCIUM SPEC-SCNC: 9.5 MG/DL (ref 8.6–10.5)
CHLORIDE SERPL-SCNC: 104 MMOL/L (ref 98–107)
CO2 SERPL-SCNC: 28.9 MMOL/L (ref 22–29)
CREAT SERPL-MCNC: 1.72 MG/DL (ref 0.76–1.27)
EGFRCR SERPLBLD CKD-EPI 2021: 41.2 ML/MIN/1.73
GLOBULIN UR ELPH-MCNC: 3.1 GM/DL
GLUCOSE SERPL-MCNC: 112 MG/DL (ref 65–99)
NT-PROBNP SERPL-MCNC: 1808 PG/ML (ref 0–900)
POTASSIUM SERPL-SCNC: 5.2 MMOL/L (ref 3.5–5.2)
PROT SERPL-MCNC: 7.2 G/DL (ref 6–8.5)
SODIUM SERPL-SCNC: 142 MMOL/L (ref 136–145)

## 2022-12-21 PROCEDURE — 80053 COMPREHEN METABOLIC PANEL: CPT | Performed by: NURSE PRACTITIONER

## 2022-12-21 PROCEDURE — 99214 OFFICE O/P EST MOD 30 MIN: CPT | Performed by: NURSE PRACTITIONER

## 2022-12-21 PROCEDURE — 83880 ASSAY OF NATRIURETIC PEPTIDE: CPT | Performed by: NURSE PRACTITIONER

## 2022-12-21 PROCEDURE — 93000 ELECTROCARDIOGRAM COMPLETE: CPT | Performed by: NURSE PRACTITIONER

## 2022-12-21 PROCEDURE — 36415 COLL VENOUS BLD VENIPUNCTURE: CPT

## 2022-12-21 RX ORDER — FUROSEMIDE 80 MG
40 TABLET ORAL DAILY
Qty: 15 TABLET | Refills: 0
Start: 2022-12-21 | End: 2023-01-20 | Stop reason: SDUPTHER

## 2022-12-21 NOTE — TELEPHONE ENCOUNTER
I called and discussed with patient.  Renal function is worse however proBNP is improved since increase Lasix.  I will place urgent referral to nephrology. In the mean time will send to lymphedema clinic.  He will continue his pumps at home for 2 hours a day.  Also decrease Lasix from 80 mg to 40 mg daily until seen by nephrology.  He verbalized understanding.  He will still see me January 3 to follow-up

## 2022-12-21 NOTE — PROGRESS NOTES
Date of Office Visit: 22  Encounter Provider: FAYE Darling  Place of Service: [facility] CARDIOLOGY  Patient Name: Jose Hurtado  :1950    Chief Complaint   Patient presents with   • Acute deep vein thrombosis (DVT) of popliteal vein of left    • Primary hypertension   • Follow-up   :     HPI: Jose Hurtado is a 72 y.o. male  with hypertension, hyperlipidemia,  chronic kidney disease, lymphedema chronic PVCs, recurrent DVTs and COPD.  He also has a history of esophageal cancer with prior gastrectomy with gastric pull through in  and chemo/radiation, now in remission.  He has had mild renal insufficiency in the past.     He was previously followed by .  I will visit him for the first time today have reviewed his medical record  He has remote history of left lower extremity DVT and has been anticoagulated.      He was in the hospital in May 2022 with chronic cough, nausea and retching.  He had chest tightness and elevated troponin.  He had acute kidney injury and had left heart catheterization which showed normal coronary arteries.  Echocardiogram showed preserved left ventricular systolic function and dilated left atrium.  He had chronic left pleural effusion.      He now presents for reassessment.  He had increased lower extremity edema.  His PCP increase Lasix from 20 mg to 80 mg.  He states his breathing has improved.  He reports increased urinary output.  However, lower extremity edema is really not improving.  He reportedly uses his lymphedema pumps at home for 2 hours every day.  He has not seen lymphedema clinic in over a year reportedly.  His wife is accompanying the visit today.  He has no chest pain.  No near-syncope or syncope.  He denies palpitations.  He states he was not aware of chronic kidney disease.  No Known Allergies        Family and social history reviewed.     ROS  All other systems were reviewed and are negative         "  Objective:     Vitals:    12/21/22 1134   BP: 110/50   BP Location: Left arm   Patient Position: Sitting   Pulse: 64   Weight: 102 kg (224 lb)   Height: 195.6 cm (77\")     Body mass index is 26.56 kg/m².    PHYSICAL EXAM:  Cardiovascular:      Normal rate.      Comments: Left greater than right  Edema:     Pretibial: 2+ edema of the left pretibial area and 1+ edema of the right pretibial area.     Ankle: 2+ edema of the left ankle and 1+ edema of the right ankle.          ECG 12 Lead    Date/Time: 12/21/2022 5:25 PM  Performed by: Silva Wells APRN  Authorized by: Silva Wells APRN   Comparison: compared with previous ECG   Similar to previous ECG  Rhythm: sinus rhythm  Rate: normal  Conduction: non-specific intraventricular conduction delay  Comments: Artifact present from spinal stimulator              Current Outpatient Medications   Medication Sig Dispense Refill   • albuterol sulfate  (90 Base) MCG/ACT inhaler Inhale 1 puff Every 4 (Four) Hours As Needed for Wheezing.     • atorvastatin (LIPITOR) 40 MG tablet Take 1 tablet by mouth Every Night. 90 tablet 3   • cyanocobalamin 1000 MCG/ML injection      • Eliquis 5 MG tablet tablet TAKE ONE TABLET BY MOUTH EVERY 12 HOURS 60 tablet 1   • Gemtesa 75 MG tablet Daily.     • metoprolol tartrate (LOPRESSOR) 25 MG tablet Take 1 tablet by mouth 2 (Two) Times a Day. 180 tablet 3   • pantoprazole (PROTONIX) 40 MG EC tablet TAKE ONE TABLET BY MOUTH TWICE A DAY BEFORE A MEAL 180 tablet 3   • PARoxetine (PAXIL) 40 MG tablet TAKE ONE TABLET BY MOUTH EVERY MORNING 30 tablet 5   • potassium chloride (MICRO-K) 10 MEQ CR capsule Take 1 capsule by mouth Daily. 90 capsule 5   • pramipexole (MIRAPEX) 1.5 MG tablet TAKE ONE TABLET BY MOUTH TWICE A  tablet 1   • tiotropium bromide monohydrate (Spiriva Respimat) 2.5 MCG/ACT aerosol solution inhaler Inhale 2 puffs Daily. 4 g 2   • furosemide (Lasix) 80 MG tablet Take 0.5 tablets by mouth Daily for 30 days. 15 tablet 0 "     No current facility-administered medications for this visit.     Assessment:       Diagnosis Plan   1. Encounter for therapeutic drug level monitoring  proBNP    Comprehensive Metabolic Panel      2. AGUILERA (dyspnea on exertion)  proBNP           Orders Placed This Encounter   Procedures   • proBNP     Order Specific Question:   Release to patient     Answer:   Routine Release   • Comprehensive Metabolic Panel     Standing Status:   Future     Number of Occurrences:   1     Standing Expiration Date:   12/22/2023     Order Specific Question:   Release to patient     Answer:   Routine Release   • ECG 12 Lead     This order was created via procedure documentation     Order Specific Question:   Release to patient     Answer:   Routine Release         Plan:       1.  72-year-old gentleman with cardiorenal syndrome, elevated proBNP and worsening renal function on higher dose Lasix.  I will refer him to the lymphedema clinic to help with lower extremity edema.  I will place urgent referral to nephrology as his renal function today is worse on Lasix 80 mg.  His proBNP has improved however I will have him decrease Lasix to 40 mg until he sees nephrology.  He will see me again on January 3 to reassess  2.  History of left lower extremity DVT on long-term anticoagulation with Eliquis without bleeding issues  3.  Esophageal cancer status postgastrectomy and gastric pull-through in 2015 status post chemotherapy and radiation now in remission  4.  Hypertension blood pressure appears adequately controlled  5.  Hyperlipidemia  6.  COPD  7.  History of ventricular ectopy  8.  Lymphedema-encouraged that he continue to use his pumps for 2 hours at home daily.  Place referral to lymphedema clinic  9.  Normal coronary arteries on cardiac catheterization May 2022           It has been a pleasure to participate in this patient's care.      Thank you,  FAYE Darling      **I used Dragon to dictate this note:**

## 2022-12-27 ENCOUNTER — HOSPITAL ENCOUNTER (OUTPATIENT)
Dept: PHYSICAL THERAPY | Facility: HOSPITAL | Age: 72
Setting detail: THERAPIES SERIES
Discharge: HOME | End: 2022-12-27

## 2022-12-27 DIAGNOSIS — I89.0 LYMPHEDEMA: Primary | ICD-10-CM

## 2022-12-27 PROCEDURE — 97162 PT EVAL MOD COMPLEX 30 MIN: CPT

## 2022-12-27 NOTE — THERAPY EVALUATION
Physical Therapy Lymphedema Initial Evaluation  [facility]     Patient Name: Jose Hurtado  : 1950  MRN: [id number]  Today's Date: 2022      Visit Date: 2022    Visit Dx:    ICD-10-CM ICD-9-CM   1. Lymphedema  I89.0 457.1       Patient Active Problem List   Diagnosis   • History of esophageal cancer   • Adenomatous polyp of colon   • Malignant neoplasm of prostate ([facility])   • RLS (restless legs syndrome)   • History of DVT (deep vein thrombosis)   • Recurrent major depressive disorder, in partial remission ([facility])   • Hypertension   • Anemia   • Insomnia due to other mental disorder   • Peripheral polyneuropathy   • B12 deficiency   • Postsurgical malabsorption   • Lymphedema   • Iron deficiency   • Gastroparesis   • Acute deep vein thrombosis (DVT) of popliteal vein of left lower extremity ([facility])   • Tremor of both hands   • Gastrointestinal hemorrhage   • Acute blood loss anemia   • Pancytopenia ([facility])   • Chronic venous hypertension due to DVT   • COPD (chronic obstructive pulmonary disease) ([facility])   • Bleeding hemorrhoid   • Malabsorption of iron   • Iron deficiency anemia   • Pleuritic chest pain   • History of esophageal cancer   • Abnormal CT scan   • Generalized weakness   • Chronic anticoagulation   • Urinary tract infection due to extended-spectrum beta lactamase (ESBL) producing Escherichia coli   • CKD (chronic kidney disease) stage 2, GFR 60-89 ml/min   • Dysphagia   • PVC's (premature ventricular contractions)   • NSTEMI (non-ST elevated myocardial infarction) ([facility])   • Bronchitis   • Shortness of breath on exertion        Past Medical History:   Diagnosis Date   • Acute deep vein thrombosis (DVT) of popliteal vein of left lower extremity ([facility]) 2020   • Anemia    • Anti-phospholipid syndrome ([facility])     On lifelong anticoagulation therapy   • Bladder disorder     LEAKAGE   ON MED  wers pads   • Bleeding hemorrhoids    • Community acquired pneumonia     HISTORY OF IN    •  Deep venous thrombosis (HCC) 2006, 2008    Left lower extremity multiple   • Depression    • Esophageal carcinoma (HCC) 12/31/2014    had chemo and radiation prior surgery   • Hemorrhoids    • HH (hiatus hernia)    • History of atrial fibrillation 2015    ONE EPISODE WHILE HOSPITALIZED   • History of kidney stones    • History of nephrolithiasis    • History of pancreatitis     PT STATES MANY YEARS AGO   • History of radiation therapy    • History of transfusion    • Hypertension    • Long-term (current) use of anticoagulants, INR goal 2.0-3.0    • Lymphedema    • Malignant neoplasm of prostate (HCC)    • Other hyperlipidemia 01/30/2018 January 30, 2018 lipid panel risk 12.8%   • Restless legs syndrome    • Sleep apnea     OCCASIONALLY WEARS CPAP   • Squamous carcinoma     on the head        Past Surgical History:   Procedure Laterality Date   • APPENDECTOMY  1950   • BRONCHOSCOPY      (Diagnostic)   • CARDIAC CATHETERIZATION N/A 5/14/2022    Procedure: Left Heart Cath;  Surgeon: Eric So MD;  Location: [facility] CATH INVASIVE LOCATION;  Service: Cardiology;  Laterality: N/A;   • CARDIAC CATHETERIZATION N/A 5/14/2022    Procedure: Coronary angiography;  Surgeon: Eric So MD;  [facility];  Service: Cardiology;  Laterality: N/A;   • CARDIAC CATHETERIZATION N/A 5/14/2022    Procedure: Left ventriculography;  Surgeon: Eric So MD;  [facility];  Service: Cardiology;  Laterality: N/A;   • CATARACT EXTRACTION Bilateral 2014   • COLONOSCOPY  12/15/2014    Complete / Description: EH, IH, torts, stool, follow-up colonoscopy due in 5 years.   • COLONOSCOPY N/A 6/13/2017    non-thrombosed external hemorrhoids, normal examined ileum, IH   • COLONOSCOPY N/A 2/26/2019    Procedure: COLONOSCOPY with Cold Polypectomy;  Surgeon: Mulugeta Millan MD;  Location: [facility] ENDOSCOPY;  Service: Gastroenterology   • COLONOSCOPY N/A  12/16/2020    Procedure: COLONOSCOPY to cecum into TI;  Surgeon: Mesha Sanchez MD;  [facility];  Service: Gastroenterology;  Laterality: N/A;  pre: lower GI bleed  post: hemorrhoids    • CYSTOSCOPY W/ LASER LITHOTRIPSY     • ENDOSCOPY N/A 6/13/2017    Procedure: ESOPHAGOGASTRODUODENOSCOPY WITH COLD BIOPSY;  Surgeon: Lake Gonzalez MD;  [facility];  Service:    • ENDOSCOPY N/A 11/18/2021    Procedure: ESOPHAGOGASTRODUODENOSCOPY WITH  DILATATION WITH FLUOROSCOPY;  Surgeon: Jose Christine III, MD;  [facility];  Service: Gastroenterology;  Laterality: N/A;  Pre: dysphagia, h/x of adinocarcinoma of esophagus  Post: same   • ESOPHAGECTOMY      April 2015, stage IIB esophageal carcinoma, sub-total resection.   • ESOPHAGECTOMY      Esophagectomy Subtotal Delton Joe Procedure   • EXCISION LESION  08/2012    Removal of Squamous Cell CA on Head   • HAMMER TOE REPAIR  09/2014    Hammertoe Operation (Each Toe), 10/2014   • HAMMER TOE REPAIR Left 10/3/2017    Procedure: Left second third and fourth distal interphalangeal joint resection with flexor tenotomy;  Surgeon: Mulugeta Lira MD;  Location:  [facility];  Service:    • HEMORRHOIDECTOMY N/A 12/23/2020    Procedure: HEMORRHOIDECTOMY;  Surgeon: Billy Julio Jr., MD;  Location: [facility] MAIN OR;  Service: General;  Laterality: N/A;   • HERNIA REPAIR      incisional   • JEJUNOSTOMY      Laparoscopic   • JEJUNOSTOMY      tube removal    • KNEE SURGERY Bilateral 1967, 1973, 1981   • PATELLA SURGERY Left     removed   • PILONIDAL CYST / SINUS EXCISION     • MS TOTAL KNEE ARTHROPLASTY Right 3/26/2018    Procedure: TOTAL KNEE ARTHROPLASTY;  Surgeon: Renny Solis MD;  Location: [facility] MAIN OR;  Service: Orthopedics   • PROSTATECTOMY  2010   • PYLOROPLASTY     • SPINAL FUSION  02/1998    C 5,6   • TONSILLECTOMY     • UPPER GASTROINTESTINAL ENDOSCOPY  12/15/2014    LA Grade D esophagitis, Pardo's, HH, multiple  duodenal ulcers   • VENTRAL/INCISIONAL HERNIA REPAIR N/A 4/14/2016    Procedure: VENTRAL/INCISIONAL HERNIA REPAIR, open, with mesh, and component separation;  Surgeon: Darren Rivas MD;  Location: [facility] OR;  Service:        Visit Dx:    ICD-10-CM ICD-9-CM   1. Lymphedema  I89.0 457.1        Patient History     Row Name 12/27/22 1200             History    Chief Complaint Swelling;Tightness  -KD      Date Current Problem(s) Began 12/19/22  -KD      Brief Description of Current Complaint Pt has been using compression pumps at home, but has not been wearing compression bandages/garment consistently--edema has been increasing in legs. Saw cardiology provider on 12/19 who felt he needed to return to clinic.  -KD      Previous treatment for THIS PROBLEM --  Lymphedema Clinic here--last seen Jan 2022  -KD      Patient/Caregiver Goals Decrease swelling  Determine best compression application for long term  -KD      How has patient tried to help current problem? Has used daily compression , now has compression pump which he's been using, but has not been bandaging since he started using them.  -KD         Pain     Pain Location Leg  -KD      Pain at Present 0  -KD      Is your sleep disturbed? Yes  -KD      Difficulties with ADL's? Lymphedema minimally affects daily self-care and routine home activities  -KD      Difficulties with recreational activities? Lymphedema minimally affects leisure activities  -KD         Fall Risk Assessment    Any falls in the past year: No  -KD         Services    Prior Rehab/Home Health Experiences Yes  -KD      Are you currently receiving Home Health services No  -KD      Do you plan to receive Home Health services in the near future No  -KD         Daily Activities    Primary Language English  -KD      Are you able to read Yes  -KD      Are you able to write Yes  -KD      How does patient learn best? Reading  -KD      Teaching needs identified Home Exercise Program;Management  "of Condition  -KD      Patient is concerned about/has problems with Difficulty with self care (i.e. bathing, dressing, toileting:;Performing home management (household chores, shopping, care of dependents)  -KD      Does patient have problems with the following? None  -KD      Barriers to learning None  -KD      Pt Participated in POC and Goals Yes  -KD         Safety    Are you being hurt, hit, or frightened by anyone at home or in your life? No  -KD      Are you being neglected by a caregiver No  -KD            User Key  (r) = Recorded By, (t) = Taken By, (c) = Cosigned By    Initials Name Provider Type    KD Ceci Ruby, PT Physical Therapist                 Lymphedema     Row Name 12/27/22 1200             Subjective Pain    Able to rate subjective pain? yes  -KD      Pre-Treatment Pain Level 0  -KD      Post-Treatment Pain Level 0  -KD         Subjective Comments    Subjective Comments States he's been bandaging his legs for several months as he did not feel he was getting adequate results using the velcro devices, class 2 stockings difficult to apply. Says he recived compression pump about 2 weeks ago, which he been using every day, but has not been bandaging.  -KD         Lymphedema Assessment    Lymphedema Classification RLE:;LLE:;stage 2 (Spontaneously Irreversible)  -KD      Infections or Cellulitis? no  -KD         LLIS - Physical Concerns    The amount of pain associated with my lymphedema is: 1  -KD      The amount of limb heaviness associated with my lymphedema is: 3  -KD      The amount of skin tightness associated with my lymphedema is: 3  -KD      The size of my swollen limb(s) seems: 4  -KD      Lymphedema affects the movement of my swollen limb(s): 1  -KD      The strength in my swollen limb(s) is: 1  -KD         LLIS - Psychosocial Concerns    Lymphedema affects my body image (i.e., \"how I think I look\"). 1  -KD      Lymphedema affects my socializing with others. 1  -KD      Lymphedema " "affects my intimate relations with spouse or partner (rate 0 if not applicable 1  -KD      Lymphedema \"gets me down\" (i.e., depression, frustration, or anger) 1  -KD      I must rely on others for help due to my lymphedema. 1  -KD      I know what to do to manage my lymphedema 1  -KD         LLIS - Functional Concerns    Lymphedema affects my ability to perform self-care activities (i.e. eating, dressing, hygiene) 1  -KD      Lymphedema affects my ability to perform routine home or work-related activities. 1  -KD      Lymphedema affects my performance of preferred leisure activities. 1  -KD      Lymphedema affects proper fit of clothing/shoes 3  -KD      Lymphedema affects my sleep 2  -KD         Posture/Observations    Posture/Observations Comments Pt amb independently with very flexed posture, genu valgus. Note abnormal contours bilat lower legs.  -KD         General ROM    GENERAL ROM COMMENTS Gen BLE WFL  -KD         MMT (Manual Muscle Testing)    General MMT Comments Gen BLE at least 4-/5  -KD         Lymphedema Edema Assessment    [place] Hump bilateral:;negative  -KD      Edema Assessment Comment Mod + 2+ edema left ankle to just below knee; mod 1+ to 2+ edema right lower leg ankle to just below knee. Abnormal lower leg contours jolly left.  -KD         Skin Changes/Observations    Skin Observations Comment Left lower leg is dry/flaky, min+ redness, noted several scratches lat aspect. Right lower leg is min dry, intact, very light redness  -KD         Lymphedema Sensation    Lymphedema Sensation Comments Intact to lt touch. No reports of N/T.  -KD         Lymphedema Measurements    Measurement Type(s) Circumferential  -KD      Circumferential Areas Lower extremities  -KD         BLE Circumferential (cm)    Measurement Location 1 Knee  -KD      Left 1 40.5 cm  -KD      Right 1 40 cm  -KD      Measurement Location 2 10cm below knee  -KD      Left 2 47 cm  -KD      Right 2 42.2 cm  -KD      Measurement Location 3 " 20cm below knee  -KD      Left 3 48.5 cm  -KD      Right 3 44.3 cm  -KD      Measurement Location 4 30cm below knee  -KD      Left 4 40.5 cm  -KD      Right 4 39 cm  -KD      Measurement Location 5 Ankle  -KD      Left 5 32 cm  -KD      Right 5 30 cm  -KD      Measurement Location 6 Midfoot  -KD      Left 6 24.5 cm  -KD      Right 6 25 cm  -KD      LLE Circumferential Total 233 cm  -KD      RLE Circumferential Total 220.5 cm  -KD         Lymphedema Life Impact Scale Totals    A.  Total Q1 - Q17 (Do not include Q18) 27  -KD      B.  Total number of questions answered (Q1-Q17) 17  -KD      C. Divide A by B 1.59  -KD      D. Multiple C by 25 39.75  -KD            User Key  (r) = Recorded By, (t) = Taken By, (c) = Cosigned By    Initials Name Provider Type    Ceci Esposito, PT Physical Therapist                                Therapy Education  Education Details: Discussed treatment, compresion options  Given: Edema management, Symptoms/condition management  Program: New (pt has previously been through lymph treatment)  How Provided: Verbal  Provided to: Patient  Level of Understanding: Verbalized       OP Exercises     Row Name 12/27/22 1200             Subjective Comments    Subjective Comments States he's been bandaging his legs for several months as he did not feel he was getting adequate results using the velcro devices, class 2 stockings difficult to apply. Says he recived compression pump about 2 weeks ago, which he been using every day, but has not been bandaging.  -KD         Subjective Pain    Able to rate subjective pain? yes  -KD      Pre-Treatment Pain Level 0  -KD      Post-Treatment Pain Level 0  -KD            User Key  (r) = Recorded By, (t) = Taken By, (c) = Cosigned By    Initials Name Provider Type    Ceci Esposito, PT Physical Therapist                             PT OP Goals     Row Name 12/27/22 1200          PT Short Term Goals    STG Date to Achieve 01/10/23  -KD     STG 1 Pt demo  awareness of condition and precautions for improved prevention, management, care of symptoms, and ease of transition to self-care of condition.  -KD     STG 1 Progress New  -KD     STG 2 Pt demo decreased net edema of >/=5-10cm for decreased edema symptoms, decreased risk of infection, and improved skin care.  -KD     STG 2 Progress New  -KD     STG 3 Patient independent and compliant with home exercise program (as able) focused on range of motion and flexibility to improve lymphatic flow and discomfort.  -KD     STG 3 Progress New  -KD        Long Term Goals    LTG Date to Achieve 01/19/23  -KD     LTG 1 Pt/family independent with self care techniques for self-management of condition.  -KD     LTG 1 Progress New  -KD     LTG 2 Pt demo decreased net edema of >/=10-20cm for decreased edema symptoms, decreased risk of infection, and improved skin care.  -KD     LTG 2 Progress New  -KD     LTG 3 Pt/family independent with compression garments as indicated for self-management of condition.  -KD     LTG 3 Progress New  -KD        Time Calculation    PT Goal Re-Cert Due Date 03/27/23  -KD           User Key  (r) = Recorded By, (t) = Taken By, (c) = Cosigned By    Initials Name Provider Type    Ceci Esposito, PT Physical Therapist                 PT Assessment/Plan     Row Name 12/27/22 1300          PT Assessment    Functional Limitations Limitation in home management;Performance in leisure activities;Performance in self-care ADL  -KD     Impairments Edema;Gait;Impaired lymphatic circulation;Integumentary integrity;Posture  -KD     Assessment Comments Pt presents in the Lymphedema Clinic today with mod to mod+ pitting edema of lower legs ankles to knees. Total circumferential measurements: R LE= 220.5    cm; L LE= 233cm. Measurements have increased by 15.1cm total LLE and by 22.6cm total RLE since he was last measured here on 1/21/2022.Skin on lower legs is dry, light to min+ red, scratches on left/intact on right.   Sensation is intact. Lymphedema Lifestyle Impact Scale score is 39.75. Physical concerns include limb heaviness and size, skin tightness, and minimally limited limb mobility/strength.   Functional concerns include minimal limitations with self-care, routine home, and leisure activities; significant concerns regarding proper fit of clothing/shoes; moderate sleep disturbance due to edema. Psychosocial concerns are minimal. Patient is a good candidate for Complete Decongestive Therapy to reduce swelling, protect/promote skin integrity, decrease risk of infection, and instruction to patient/family of self-management skills. Pt agrees to participate in Phase I (skilled care) and Phase II (self-management) of CDT. Feel that pt needs to be seen for edema reduction/skin care, and also to further help him with appropriate long term compression that he is able to apply daily with good efficacy, as that is an issue at this time.  -KD     Rehab Potential Good  -KD     Patient/caregiver participated in establishment of treatment plan and goals Yes  -KD     Patient would benefit from skilled therapy intervention Yes  -KD        PT Plan    PT Frequency 5x/week  -KD     Predicted Duration of Therapy Intervention (PT) 4 weeks  -KD     Planned CPT's? PT EVAL MOD COMPLELITY: 13790;PT RE-EVAL: 70964;PT THER PROC EA 15 MIN: 30827;PT THER ACT EA 15 MIN: 20877;PT MANUAL THERAPY EA 15 MIN: 82761;PT SELF CARE/HOME MGMT/TRAIN EA 15: 28116;PT SELF CARE/MGMT/TRAIN 15 MIN: 40953  -KD     Physical Therapy Interventions (Optional Details) bandaging;home exercise program;manual lymphatic drainage;patient/family education;other (see comments)  skin care  -KD     PT Plan Comments See pt per PT Plan.  -KD           User Key  (r) = Recorded By, (t) = Taken By, (c) = Cosigned By    Initials Name Provider Type    Ceci Esposito, PT Physical Therapist                             Time Calculation:   Start Time: 1123  Stop Time: 1205  Time Calculation  (min): 42 min   Therapy Charges for Today     Code Description Service Date Service Provider Modifiers Qty    [id number] HC PT EVAL MOD COMPLEXITY 3 12/27/2022 Ceci Ruby, PT KX, GP 1                    Ceci Ruby, PT  12/27/2022

## 2022-12-28 ENCOUNTER — TRANSCRIBE ORDERS (OUTPATIENT)
Dept: ADMINISTRATIVE | Facility: HOSPITAL | Age: 72
End: 2022-12-28
Payer: MEDICARE

## 2022-12-28 ENCOUNTER — TELEPHONE (OUTPATIENT)
Dept: CARDIOLOGY | Facility: CLINIC | Age: 72
End: 2022-12-28

## 2022-12-28 ENCOUNTER — OFFICE VISIT (OUTPATIENT)
Dept: INTERNAL MEDICINE | Age: 72
End: 2022-12-28

## 2022-12-28 VITALS
SYSTOLIC BLOOD PRESSURE: 132 MMHG | BODY MASS INDEX: 27.18 KG/M2 | DIASTOLIC BLOOD PRESSURE: 62 MMHG | TEMPERATURE: 98.2 F | OXYGEN SATURATION: 98 % | HEIGHT: 77 IN | WEIGHT: 230.2 LBS | HEART RATE: 55 BPM

## 2022-12-28 DIAGNOSIS — I89.0 LYMPHEDEMA: ICD-10-CM

## 2022-12-28 DIAGNOSIS — R06.00 DYSPNEA, UNSPECIFIED TYPE: Primary | ICD-10-CM

## 2022-12-28 DIAGNOSIS — R06.02 SHORTNESS OF BREATH ON EXERTION: Primary | ICD-10-CM

## 2022-12-28 DIAGNOSIS — N18.2 CKD (CHRONIC KIDNEY DISEASE) STAGE 2, GFR 60-89 ML/MIN: ICD-10-CM

## 2022-12-28 PROCEDURE — 99213 OFFICE O/P EST LOW 20 MIN: CPT | Performed by: NURSE PRACTITIONER

## 2022-12-28 NOTE — ASSESSMENT & PLAN NOTE
Referral to Nephrology. Recent BUN 33, Creat 1.72. Decreased his lasix to 40 mg daily, patient referred by Cardiology to lymphedema clinic and his is to continue his pumps at home for 2 hours a day.

## 2022-12-28 NOTE — TELEPHONE ENCOUNTER
I reviewed his referral status and his referral has been processed and closed.  Please let him know I will send a message to the scheduling/referral coordinator to look into this

## 2022-12-28 NOTE — TELEPHONE ENCOUNTER
Patient is calling to advise Silva that he has not heard anything about his referral to Kidney specialist. He is asking if there is anything he can do to speed up the process of getting an appointment.     KAY Niño  12/28/2022

## 2022-12-28 NOTE — PROGRESS NOTES
"    I N T E R N A L  M E D I C I N E  Halima Ty, APRN    ENCOUNTER DATE:  12/28/2022    Jose FITCH Hurtado / 72 y.o. / male      CHIEF COMPLAINT / REASON FOR OFFICE VISIT     Follow-up shortness of breath and lymphedema, chronic left pleural effusion      ASSESSMENT & PLAN     Diagnoses and all orders for this visit:    1. Shortness of breath on exertion (Primary)  Assessment & Plan:  Chronic. Patient denies any shortness of breath more than normal.     Orders:  -     proBNP  -     Basic metabolic panel    2. Lymphedema  -     proBNP  -     Basic metabolic panel    3. CKD (chronic kidney disease) stage 2, GFR 60-89 ml/min  Assessment & Plan:  Referral to Nephrology. Recent BUN 33, Creat 1.72. Decreased his lasix to 40 mg daily, patient referred by Cardiology to lymphedema clinic and he is to continue his pumps at home for 2 hours a day.      Orders:  -     proBNP  -     Basic metabolic panel       SUMMARY/DISCUSSION  • Labs ordered today. Patient referral to Nephrology on 12/19/22 and awaiting phone call- request to referrals to follow up.   • Follow up with Cardiology as scheduled  • Follow up with Dr Bolton as scheduled and as needed  • I spent 25 min in direct care of this patient on this date of service. This time includes times spent by me in the following activities: Preparing for the visit, obtaining and/or reviewing a separately obtained history, performing a medically appropriate examination and/or evaluation, reviewing medical records, reviewing tests, ordering medications, tests, or procedures, counseling and educating the patient, documenting information in the medical record and reviewing office note/correspondence from other providers.     Return in about 6 weeks (around 2/9/2023) for Next scheduled follow up.      VITAL SIGNS     Visit Vitals  /62 (BP Location: Left arm, Patient Position: Sitting, Cuff Size: Adult)   Pulse 55   Temp 98.2 °F (36.8 °C) (Temporal)   Ht 195.6 cm (77\")   Wt 104 kg (230 " lb 3.2 oz)   SpO2 98%   BMI 27.30 kg/m²           BP Readings from Last 3 Encounters:   12/28/22 132/62   12/21/22 110/50   12/16/22 112/54     Wt Readings from Last 3 Encounters:   12/28/22 104 kg (230 lb 3.2 oz)   12/21/22 102 kg (224 lb)   12/16/22 103 kg (227 lb)     Body mass index is 27.3 kg/m².    Blood pressure readings recorded on patient flowsheet:  No flowsheet data found.          MEDICATIONS AT THE TIME OF OFFICE VISIT     Current Outpatient Medications on File Prior to Visit   Medication Sig Dispense Refill   • albuterol sulfate  (90 Base) MCG/ACT inhaler Inhale 1 puff Every 4 (Four) Hours As Needed for Wheezing.     • atorvastatin (LIPITOR) 40 MG tablet Take 1 tablet by mouth Every Night. 90 tablet 3   • cyanocobalamin 1000 MCG/ML injection      • Eliquis 5 MG tablet tablet TAKE ONE TABLET BY MOUTH EVERY 12 HOURS 60 tablet 1   • furosemide (Lasix) 80 MG tablet Take 0.5 tablets by mouth Daily for 30 days. 15 tablet 0   • Gemtesa 75 MG tablet Daily.     • metoprolol tartrate (LOPRESSOR) 25 MG tablet Take 1 tablet by mouth 2 (Two) Times a Day. 180 tablet 3   • pantoprazole (PROTONIX) 40 MG EC tablet TAKE ONE TABLET BY MOUTH TWICE A DAY BEFORE A MEAL 180 tablet 3   • PARoxetine (PAXIL) 40 MG tablet TAKE ONE TABLET BY MOUTH EVERY MORNING 30 tablet 5   • potassium chloride (MICRO-K) 10 MEQ CR capsule Take 1 capsule by mouth Daily. 90 capsule 5   • pramipexole (MIRAPEX) 1.5 MG tablet TAKE ONE TABLET BY MOUTH TWICE A  tablet 1   • tiotropium bromide monohydrate (Spiriva Respimat) 2.5 MCG/ACT aerosol solution inhaler Inhale 2 puffs Daily. 4 g 2     No current facility-administered medications on file prior to visit.        HISTORY OF PRESENT ILLNESS     72 year old male being seen today for follow up visit. Patient states that since the increase in Lasix his breathing has improved. Has had an increase in urinary output, but no improvement in his lower extremity edema. States that he uses his  lymphedema pumps at home about 2 hrs every day, but has not been seen by the lymphedema clinic in over a year. Patient denies any chest pain, palpations, no syncopal episodes, or palpations. Patient states that he has not heard from the Nephrologist.       Patient Care Team:  Brandin Bolton MD as PCP - General (Internal Medicine)  Tapan, George THORPE II, MD as Consulting Physician (Hematology and Oncology)  Jose Inman MD as Consulting Physician (Urology)  Mulugeta Millan MD as Consulting Physician (Gastroenterology)  Jose Christine III, MD as Referring Physician (Thoracic Surgery)  Seth Randhawa MD as Consulting Physician (Ophthalmology)  James Cyr MD as Consulting Physician (Cardiology)    REVIEW OF SYSTEMS     Review of Systems   Constitutional: Negative.    HENT: Negative.    Respiratory: Positive for shortness of breath.    Cardiovascular: Positive for leg swelling. Negative for chest pain and palpitations.   Genitourinary: Negative.    Skin: Negative.    Neurological: Negative for dizziness and headaches.   Hematological: Negative.           PHYSICAL EXAMINATION     Physical Exam  Cardiovascular:      Rate and Rhythm: Regular rhythm. Bradycardia present.      Pulses: Normal pulses.      Heart sounds: Normal heart sounds.   Pulmonary:      Effort: Pulmonary effort is normal. No respiratory distress.      Breath sounds: Normal breath sounds.   Abdominal:      General: Bowel sounds are normal.      Palpations: Abdomen is soft.   Musculoskeletal:      Right lower leg: Edema present.      Left lower leg: Edema present.      Comments: kamran lower extremity lymphedema   Skin:     General: Skin is warm and dry.   Neurological:      Mental Status: He is alert. Mental status is at baseline.             REVIEWED DATA     Labs:     Lab Results   Component Value Date     12/21/2022    K 5.2 12/21/2022    CALCIUM 9.5 12/21/2022    AST 22 12/21/2022    ALT 23 12/21/2022    BUN 33 (H) 12/21/2022     CREATININE 1.72 (H) 12/21/2022    CREATININE 1.51 (H) 12/16/2022    CREATININE 1.39 (H) 12/05/2022    EGFRIFNONA 62 06/04/2021    EGFRIFAFRI 75 06/04/2021       Lab Results   Component Value Date    HGBA1C 5.50 08/06/2021    HGBA1C 6.20 (H) 01/17/2020       Lab Results   Component Value Date    LDL 98 06/02/2022    LDL 92 05/10/2018     (H) 01/30/2018    HDL 60 06/02/2022    HDL 45 05/10/2018    TRIG 53 06/02/2022    TRIG 79 05/10/2018       Lab Results   Component Value Date    TSH 2.710 01/17/2020    TSH 2.050 06/06/2019    TSH 3.440 02/13/2019    FREET4 1.52 01/17/2020    FREET4 1.18 06/06/2019    FREET4 1.33 02/13/2019       Lab Results   Component Value Date    WBC 4.10 11/22/2022    HGB 11.9 (L) 11/22/2022     11/22/2022       No results found for: MALBCRERATIO       Imaging:           Medical Tests:           Summary of old records / correspondence / consultant report:           Request outside records:           FAYE Singer

## 2022-12-29 LAB
BUN SERPL-MCNC: 27 MG/DL (ref 8–23)
BUN/CREAT SERPL: 16.9 (ref 7–25)
CALCIUM SERPL-MCNC: 9.1 MG/DL (ref 8.6–10.5)
CHLORIDE SERPL-SCNC: 104 MMOL/L (ref 98–107)
CO2 SERPL-SCNC: 29.5 MMOL/L (ref 22–29)
CREAT SERPL-MCNC: 1.6 MG/DL (ref 0.76–1.27)
EGFRCR SERPLBLD CKD-EPI 2021: 44.9 ML/MIN/1.73
GLUCOSE SERPL-MCNC: 103 MG/DL (ref 65–99)
NT-PROBNP SERPL-MCNC: 1587 PG/ML (ref 0–376)
POTASSIUM SERPL-SCNC: 4.7 MMOL/L (ref 3.5–5.2)
SODIUM SERPL-SCNC: 142 MMOL/L (ref 136–145)

## 2023-01-02 ENCOUNTER — HOSPITAL ENCOUNTER (OUTPATIENT)
Facility: HOSPITAL | Age: 73
LOS: 1 days | Discharge: HOME | End: 2023-01-04
Attending: EMERGENCY MEDICINE | Admitting: STUDENT IN AN ORGANIZED HEALTH CARE EDUCATION/TRAINING PROGRAM
Payer: MEDICARE

## 2023-01-02 ENCOUNTER — APPOINTMENT (OUTPATIENT)
Dept: GENERAL RADIOLOGY | Facility: HOSPITAL | Age: 73
End: 2023-01-02
Payer: MEDICARE

## 2023-01-02 DIAGNOSIS — J18.9 COMMUNITY ACQUIRED PNEUMONIA, UNSPECIFIED LATERALITY: ICD-10-CM

## 2023-01-02 DIAGNOSIS — R06.09 EXERTIONAL DYSPNEA: Primary | ICD-10-CM

## 2023-01-02 DIAGNOSIS — I21.4 NSTEMI (NON-ST ELEVATED MYOCARDIAL INFARCTION): ICD-10-CM

## 2023-01-02 DIAGNOSIS — N18.2 CKD (CHRONIC KIDNEY DISEASE) STAGE 2, GFR 60-89 ML/MIN: ICD-10-CM

## 2023-01-02 LAB
ALBUMIN SERPL-MCNC: 3.8 G/DL (ref 3.5–5.2)
ALBUMIN/GLOB SERPL: 1.2 G/DL
ALP SERPL-CCNC: 114 U/L (ref 39–117)
ALT SERPL W P-5'-P-CCNC: 18 U/L (ref 1–41)
ANION GAP SERPL CALCULATED.3IONS-SCNC: 9.5 MMOL/L (ref 5–15)
AST SERPL-CCNC: 25 U/L (ref 1–40)
BASOPHILS # BLD AUTO: 0.01 10*3/MM3 (ref 0–0.2)
BASOPHILS NFR BLD AUTO: 0.3 % (ref 0–1.5)
BILIRUB SERPL-MCNC: 0.6 MG/DL (ref 0–1.2)
BUN SERPL-MCNC: 18 MG/DL (ref 8–23)
BUN/CREAT SERPL: 12.2 (ref 7–25)
CALCIUM SPEC-SCNC: 8.8 MG/DL (ref 8.6–10.5)
CHLORIDE SERPL-SCNC: 104 MMOL/L (ref 98–107)
CO2 SERPL-SCNC: 23.5 MMOL/L (ref 22–29)
CREAT SERPL-MCNC: 1.47 MG/DL (ref 0.76–1.27)
DEPRECATED RDW RBC AUTO: 43 FL (ref 37–54)
EGFRCR SERPLBLD CKD-EPI 2021: 49.7 ML/MIN/1.73
EOSINOPHIL # BLD AUTO: 0.05 10*3/MM3 (ref 0–0.4)
EOSINOPHIL NFR BLD AUTO: 1.7 % (ref 0.3–6.2)
ERYTHROCYTE [DISTWIDTH] IN BLOOD BY AUTOMATED COUNT: 13.1 % (ref 12.3–15.4)
GLOBULIN UR ELPH-MCNC: 3.2 GM/DL
GLUCOSE SERPL-MCNC: 115 MG/DL (ref 65–99)
HCT VFR BLD AUTO: 33.3 % (ref 37.5–51)
HGB BLD-MCNC: 11.4 G/DL (ref 13–17.7)
IMM GRANULOCYTES # BLD AUTO: 0.01 10*3/MM3 (ref 0–0.05)
IMM GRANULOCYTES NFR BLD AUTO: 0.3 % (ref 0–0.5)
LYMPHOCYTES # BLD AUTO: 0.65 10*3/MM3 (ref 0.7–3.1)
LYMPHOCYTES NFR BLD AUTO: 22 % (ref 19.6–45.3)
MCH RBC QN AUTO: 30.4 PG (ref 26.6–33)
MCHC RBC AUTO-ENTMCNC: 34.2 G/DL (ref 31.5–35.7)
MCV RBC AUTO: 88.8 FL (ref 79–97)
MONOCYTES # BLD AUTO: 0.34 10*3/MM3 (ref 0.1–0.9)
MONOCYTES NFR BLD AUTO: 11.5 % (ref 5–12)
NEUTROPHILS NFR BLD AUTO: 1.89 10*3/MM3 (ref 1.7–7)
NEUTROPHILS NFR BLD AUTO: 64.2 % (ref 42.7–76)
NRBC BLD AUTO-RTO: 0 /100 WBC (ref 0–0.2)
NT-PROBNP SERPL-MCNC: 825 PG/ML (ref 0–900)
PLATELET # BLD AUTO: 118 10*3/MM3 (ref 140–450)
PMV BLD AUTO: 9.1 FL (ref 6–12)
POTASSIUM SERPL-SCNC: 4.2 MMOL/L (ref 3.5–5.2)
PROT SERPL-MCNC: 7 G/DL (ref 6–8.5)
RBC # BLD AUTO: 3.75 10*6/MM3 (ref 4.14–5.8)
SODIUM SERPL-SCNC: 137 MMOL/L (ref 136–145)
TROPONIN T SERPL-MCNC: 0.1 NG/ML (ref 0–0.03)
WBC NRBC COR # BLD: 2.95 10*3/MM3 (ref 3.4–10.8)

## 2023-01-02 PROCEDURE — 93010 ELECTROCARDIOGRAM REPORT: CPT | Performed by: INTERNAL MEDICINE

## 2023-01-02 PROCEDURE — 84484 ASSAY OF TROPONIN QUANT: CPT | Performed by: EMERGENCY MEDICINE

## 2023-01-02 PROCEDURE — 71045 X-RAY EXAM CHEST 1 VIEW: CPT

## 2023-01-02 PROCEDURE — 93005 ELECTROCARDIOGRAM TRACING: CPT | Performed by: EMERGENCY MEDICINE

## 2023-01-02 PROCEDURE — 85025 COMPLETE CBC W/AUTO DIFF WBC: CPT | Performed by: EMERGENCY MEDICINE

## 2023-01-02 PROCEDURE — 80053 COMPREHEN METABOLIC PANEL: CPT | Performed by: EMERGENCY MEDICINE

## 2023-01-02 PROCEDURE — 83880 ASSAY OF NATRIURETIC PEPTIDE: CPT | Performed by: EMERGENCY MEDICINE

## 2023-01-02 PROCEDURE — 99285 EMERGENCY DEPT VISIT HI MDM: CPT

## 2023-01-02 RX ORDER — SODIUM CHLORIDE 0.9 % (FLUSH) 0.9 %
10 SYRINGE (ML) INJECTION AS NEEDED
Status: DISCONTINUED | OUTPATIENT
Start: 2023-01-02 | End: 2023-01-04 | Stop reason: HOSPADM

## 2023-01-03 ENCOUNTER — APPOINTMENT (OUTPATIENT)
Dept: CT IMAGING | Facility: HOSPITAL | Age: 73
End: 2023-01-03
Payer: MEDICARE

## 2023-01-03 ENCOUNTER — APPOINTMENT (OUTPATIENT)
Dept: PHYSICAL THERAPY | Facility: HOSPITAL | Age: 73
End: 2023-01-03
Payer: MEDICARE

## 2023-01-03 ENCOUNTER — TELEPHONE (OUTPATIENT)
Dept: CARDIOLOGY | Facility: CLINIC | Age: 73
End: 2023-01-03

## 2023-01-03 PROBLEM — I48.91 ATRIAL FIBRILLATION: Status: ACTIVE | Noted: 2023-01-03

## 2023-01-03 PROBLEM — R06.00 DYSPNEA: Status: ACTIVE | Noted: 2022-12-16

## 2023-01-03 PROBLEM — J44.1 CHRONIC OBSTRUCTIVE PULMONARY DISEASE WITH ACUTE EXACERBATION: Status: ACTIVE | Noted: 2020-12-16

## 2023-01-03 PROBLEM — R77.8 ELEVATED TROPONIN: Status: ACTIVE | Noted: 2023-01-03

## 2023-01-03 PROBLEM — J21.0 RSV (ACUTE BRONCHIOLITIS DUE TO RESPIRATORY SYNCYTIAL VIRUS): Status: ACTIVE | Noted: 2023-01-03

## 2023-01-03 PROBLEM — J44.0 CHRONIC OBSTRUCTIVE PULMONARY DISEASE WITH ACUTE LOWER RESPIRATORY INFECTION: Status: ACTIVE | Noted: 2020-12-16

## 2023-01-03 PROBLEM — R79.89 ELEVATED TROPONIN: Status: ACTIVE | Noted: 2023-01-03

## 2023-01-03 LAB
ANION GAP SERPL CALCULATED.3IONS-SCNC: 11.8 MMOL/L (ref 5–15)
B PARAPERT DNA SPEC QL NAA+PROBE: NOT DETECTED
B PERT DNA SPEC QL NAA+PROBE: NOT DETECTED
BASOPHILS # BLD AUTO: 0.01 10*3/MM3 (ref 0–0.2)
BASOPHILS NFR BLD AUTO: 0.4 % (ref 0–1.5)
BUN SERPL-MCNC: 17 MG/DL (ref 8–23)
BUN/CREAT SERPL: 13.8 (ref 7–25)
C PNEUM DNA NPH QL NAA+NON-PROBE: NOT DETECTED
CALCIUM SPEC-SCNC: 8 MG/DL (ref 8.6–10.5)
CHLORIDE SERPL-SCNC: 107 MMOL/L (ref 98–107)
CO2 SERPL-SCNC: 19.2 MMOL/L (ref 22–29)
CREAT SERPL-MCNC: 1.23 MG/DL (ref 0.76–1.27)
D-LACTATE SERPL-SCNC: 0.7 MMOL/L (ref 0.5–2)
DEPRECATED RDW RBC AUTO: 41.6 FL (ref 37–54)
EGFRCR SERPLBLD CKD-EPI 2021: 61.6 ML/MIN/1.73
EOSINOPHIL # BLD AUTO: 0.09 10*3/MM3 (ref 0–0.4)
EOSINOPHIL NFR BLD AUTO: 3.8 % (ref 0.3–6.2)
ERYTHROCYTE [DISTWIDTH] IN BLOOD BY AUTOMATED COUNT: 13 % (ref 12.3–15.4)
FLUAV SUBTYP SPEC NAA+PROBE: NOT DETECTED
FLUBV RNA ISLT QL NAA+PROBE: NOT DETECTED
GLUCOSE SERPL-MCNC: 82 MG/DL (ref 65–99)
HADV DNA SPEC NAA+PROBE: NOT DETECTED
HCOV 229E RNA SPEC QL NAA+PROBE: NOT DETECTED
HCOV HKU1 RNA SPEC QL NAA+PROBE: NOT DETECTED
HCOV NL63 RNA SPEC QL NAA+PROBE: NOT DETECTED
HCOV OC43 RNA SPEC QL NAA+PROBE: NOT DETECTED
HCT VFR BLD AUTO: 33.9 % (ref 37.5–51)
HGB BLD-MCNC: 11.5 G/DL (ref 13–17.7)
HMPV RNA NPH QL NAA+NON-PROBE: NOT DETECTED
HPIV1 RNA ISLT QL NAA+PROBE: NOT DETECTED
HPIV2 RNA SPEC QL NAA+PROBE: NOT DETECTED
HPIV3 RNA NPH QL NAA+PROBE: NOT DETECTED
HPIV4 P GENE NPH QL NAA+PROBE: NOT DETECTED
IMM GRANULOCYTES # BLD AUTO: 0.03 10*3/MM3 (ref 0–0.05)
IMM GRANULOCYTES NFR BLD AUTO: 1.3 % (ref 0–0.5)
INR PPP: 1.3 (ref 0.9–1.1)
LYMPHOCYTES # BLD AUTO: 0.71 10*3/MM3 (ref 0.7–3.1)
LYMPHOCYTES NFR BLD AUTO: 29.7 % (ref 19.6–45.3)
M PNEUMO IGG SER IA-ACNC: NOT DETECTED
MCH RBC QN AUTO: 29.7 PG (ref 26.6–33)
MCHC RBC AUTO-ENTMCNC: 33.9 G/DL (ref 31.5–35.7)
MCV RBC AUTO: 87.6 FL (ref 79–97)
MONOCYTES # BLD AUTO: 0.27 10*3/MM3 (ref 0.1–0.9)
MONOCYTES NFR BLD AUTO: 11.3 % (ref 5–12)
NEUTROPHILS NFR BLD AUTO: 1.28 10*3/MM3 (ref 1.7–7)
NEUTROPHILS NFR BLD AUTO: 53.5 % (ref 42.7–76)
NRBC BLD AUTO-RTO: 0 /100 WBC (ref 0–0.2)
PLATELET # BLD AUTO: 125 10*3/MM3 (ref 140–450)
PMV BLD AUTO: 9.1 FL (ref 6–12)
POTASSIUM SERPL-SCNC: 4.1 MMOL/L (ref 3.5–5.2)
PROCALCITONIN SERPL-MCNC: 0.15 NG/ML (ref 0–0.25)
PROTHROMBIN TIME: 16.3 SECONDS (ref 11.7–14.2)
QT INTERVAL: 363 MS
QT INTERVAL: 394 MS
RBC # BLD AUTO: 3.87 10*6/MM3 (ref 4.14–5.8)
RHINOVIRUS RNA SPEC NAA+PROBE: NOT DETECTED
RSV RNA NPH QL NAA+NON-PROBE: DETECTED
SARS-COV-2 RNA NPH QL NAA+NON-PROBE: NOT DETECTED
SODIUM SERPL-SCNC: 138 MMOL/L (ref 136–145)
TROPONIN T SERPL-MCNC: 0.1 NG/ML (ref 0–0.03)
TROPONIN T SERPL-MCNC: 0.1 NG/ML (ref 0–0.03)
TROPONIN T SERPL-MCNC: 0.11 NG/ML (ref 0–0.03)
WBC NRBC COR # BLD: 2.39 10*3/MM3 (ref 3.4–10.8)

## 2023-01-03 PROCEDURE — G0378 HOSPITAL OBSERVATION PER HR: HCPCS

## 2023-01-03 PROCEDURE — 96375 TX/PRO/DX INJ NEW DRUG ADDON: CPT

## 2023-01-03 PROCEDURE — 25010000002 CEFTRIAXONE PER 250 MG: Performed by: EMERGENCY MEDICINE

## 2023-01-03 PROCEDURE — 63710000001 FUROSEMIDE 40 MG TABLET: Performed by: NURSE PRACTITIONER

## 2023-01-03 PROCEDURE — 0 IOPAMIDOL PER 1 ML: Performed by: EMERGENCY MEDICINE

## 2023-01-03 PROCEDURE — A9270 NON-COVERED ITEM OR SERVICE: HCPCS | Performed by: INTERNAL MEDICINE

## 2023-01-03 PROCEDURE — 84145 PROCALCITONIN (PCT): CPT | Performed by: STUDENT IN AN ORGANIZED HEALTH CARE EDUCATION/TRAINING PROGRAM

## 2023-01-03 PROCEDURE — 94760 N-INVAS EAR/PLS OXIMETRY 1: CPT

## 2023-01-03 PROCEDURE — A9270 NON-COVERED ITEM OR SERVICE: HCPCS | Performed by: NURSE PRACTITIONER

## 2023-01-03 PROCEDURE — 93005 ELECTROCARDIOGRAM TRACING: CPT | Performed by: STUDENT IN AN ORGANIZED HEALTH CARE EDUCATION/TRAINING PROGRAM

## 2023-01-03 PROCEDURE — 99222 1ST HOSP IP/OBS MODERATE 55: CPT | Performed by: INTERNAL MEDICINE

## 2023-01-03 PROCEDURE — 85025 COMPLETE CBC W/AUTO DIFF WBC: CPT | Performed by: NURSE PRACTITIONER

## 2023-01-03 PROCEDURE — 80048 BASIC METABOLIC PNL TOTAL CA: CPT | Performed by: NURSE PRACTITIONER

## 2023-01-03 PROCEDURE — 96372 THER/PROPH/DIAG INJ SC/IM: CPT

## 2023-01-03 PROCEDURE — 83605 ASSAY OF LACTIC ACID: CPT | Performed by: EMERGENCY MEDICINE

## 2023-01-03 PROCEDURE — 93010 ELECTROCARDIOGRAM REPORT: CPT | Performed by: INTERNAL MEDICINE

## 2023-01-03 PROCEDURE — 87040 BLOOD CULTURE FOR BACTERIA: CPT | Performed by: EMERGENCY MEDICINE

## 2023-01-03 PROCEDURE — 94761 N-INVAS EAR/PLS OXIMETRY MLT: CPT

## 2023-01-03 PROCEDURE — 63710000001 GUAIFENESIN 600 MG TABLET SUSTAINED-RELEASE 12 HOUR: Performed by: NURSE PRACTITIONER

## 2023-01-03 PROCEDURE — 84484 ASSAY OF TROPONIN QUANT: CPT | Performed by: NURSE PRACTITIONER

## 2023-01-03 PROCEDURE — 63710000001 ATORVASTATIN 20 MG TABLET: Performed by: NURSE PRACTITIONER

## 2023-01-03 PROCEDURE — 96365 THER/PROPH/DIAG IV INF INIT: CPT

## 2023-01-03 PROCEDURE — 94799 UNLISTED PULMONARY SVC/PX: CPT

## 2023-01-03 PROCEDURE — 84484 ASSAY OF TROPONIN QUANT: CPT | Performed by: EMERGENCY MEDICINE

## 2023-01-03 PROCEDURE — 96361 HYDRATE IV INFUSION ADD-ON: CPT

## 2023-01-03 PROCEDURE — 71275 CT ANGIOGRAPHY CHEST: CPT

## 2023-01-03 PROCEDURE — 25010000002 ENOXAPARIN PER 10 MG: Performed by: EMERGENCY MEDICINE

## 2023-01-03 PROCEDURE — 25010000002 ENOXAPARIN PER 10 MG: Performed by: NURSE PRACTITIONER

## 2023-01-03 PROCEDURE — 94640 AIRWAY INHALATION TREATMENT: CPT

## 2023-01-03 PROCEDURE — 63710000001 METOPROLOL TARTRATE 50 MG TABLET: Performed by: INTERNAL MEDICINE

## 2023-01-03 PROCEDURE — 63710000001 BUDESONIDE-FORMOTEROL 160-4.5 MCG/ACT AEROSOL 6 G INHALER: Performed by: NURSE PRACTITIONER

## 2023-01-03 PROCEDURE — 63710000001 PAROXETINE 20 MG TABLET: Performed by: NURSE PRACTITIONER

## 2023-01-03 PROCEDURE — 85610 PROTHROMBIN TIME: CPT | Performed by: NURSE PRACTITIONER

## 2023-01-03 PROCEDURE — 0202U NFCT DS 22 TRGT SARS-COV-2: CPT | Performed by: STUDENT IN AN ORGANIZED HEALTH CARE EDUCATION/TRAINING PROGRAM

## 2023-01-03 PROCEDURE — 63710000001 PREDNISONE PER 1 MG: Performed by: NURSE PRACTITIONER

## 2023-01-03 PROCEDURE — A9270 NON-COVERED ITEM OR SERVICE: HCPCS | Performed by: EMERGENCY MEDICINE

## 2023-01-03 PROCEDURE — 25010000002 AZITHROMYCIN PER 500 MG: Performed by: EMERGENCY MEDICINE

## 2023-01-03 PROCEDURE — 36415 COLL VENOUS BLD VENIPUNCTURE: CPT | Performed by: NURSE PRACTITIONER

## 2023-01-03 PROCEDURE — 63710000001 PANTOPRAZOLE 40 MG TABLET DELAYED-RELEASE: Performed by: NURSE PRACTITIONER

## 2023-01-03 PROCEDURE — 63710000001 PRAMIPEXOLE 1.5 MG TABLET: Performed by: NURSE PRACTITIONER

## 2023-01-03 PROCEDURE — 63710000001 FLUTICASONE 50 MCG/ACT SUSPENSION 16 G BOTTLE: Performed by: NURSE PRACTITIONER

## 2023-01-03 PROCEDURE — 94664 DEMO&/EVAL PT USE INHALER: CPT

## 2023-01-03 PROCEDURE — 63710000001 ASPIRIN 81 MG CHEWABLE TABLET: Performed by: EMERGENCY MEDICINE

## 2023-01-03 RX ORDER — SODIUM CHLORIDE 9 MG/ML
40 INJECTION, SOLUTION INTRAVENOUS AS NEEDED
Status: DISCONTINUED | OUTPATIENT
Start: 2023-01-03 | End: 2023-01-04 | Stop reason: HOSPADM

## 2023-01-03 RX ORDER — ENOXAPARIN SODIUM 100 MG/ML
1 INJECTION SUBCUTANEOUS EVERY 12 HOURS
Status: DISCONTINUED | OUTPATIENT
Start: 2023-01-03 | End: 2023-01-04 | Stop reason: HOSPADM

## 2023-01-03 RX ORDER — GUAIFENESIN 600 MG/1
600 TABLET, EXTENDED RELEASE ORAL EVERY 12 HOURS SCHEDULED
Status: DISCONTINUED | OUTPATIENT
Start: 2023-01-03 | End: 2023-01-04 | Stop reason: HOSPADM

## 2023-01-03 RX ORDER — ACETAMINOPHEN 650 MG/1
650 SUPPOSITORY RECTAL EVERY 4 HOURS PRN
Status: DISCONTINUED | OUTPATIENT
Start: 2023-01-03 | End: 2023-01-04 | Stop reason: HOSPADM

## 2023-01-03 RX ORDER — ALBUTEROL SULFATE 2.5 MG/3ML
2.5 SOLUTION RESPIRATORY (INHALATION) EVERY 6 HOURS PRN
Status: DISCONTINUED | OUTPATIENT
Start: 2023-01-03 | End: 2023-01-04 | Stop reason: HOSPADM

## 2023-01-03 RX ORDER — ENOXAPARIN SODIUM 100 MG/ML
1 INJECTION SUBCUTANEOUS ONCE
Status: COMPLETED | OUTPATIENT
Start: 2023-01-03 | End: 2023-01-03

## 2023-01-03 RX ORDER — METOPROLOL TARTRATE 50 MG/1
100 TABLET, FILM COATED ORAL 2 TIMES DAILY
Status: DISCONTINUED | OUTPATIENT
Start: 2023-01-03 | End: 2023-01-04 | Stop reason: HOSPADM

## 2023-01-03 RX ORDER — FLUTICASONE PROPIONATE 50 MCG
2 SPRAY, SUSPENSION (ML) NASAL DAILY
Status: DISCONTINUED | OUTPATIENT
Start: 2023-01-03 | End: 2023-01-04 | Stop reason: HOSPADM

## 2023-01-03 RX ORDER — PRAMIPEXOLE DIHYDROCHLORIDE 1.5 MG/1
3 TABLET ORAL NIGHTLY
Status: DISCONTINUED | OUTPATIENT
Start: 2023-01-03 | End: 2023-01-04 | Stop reason: HOSPADM

## 2023-01-03 RX ORDER — SODIUM CHLORIDE 0.9 % (FLUSH) 0.9 %
10 SYRINGE (ML) INJECTION AS NEEDED
Status: DISCONTINUED | OUTPATIENT
Start: 2023-01-03 | End: 2023-01-04 | Stop reason: HOSPADM

## 2023-01-03 RX ORDER — ONDANSETRON 2 MG/ML
4 INJECTION INTRAMUSCULAR; INTRAVENOUS EVERY 6 HOURS PRN
Status: DISCONTINUED | OUTPATIENT
Start: 2023-01-03 | End: 2023-01-04 | Stop reason: HOSPADM

## 2023-01-03 RX ORDER — ACETAMINOPHEN 160 MG/5ML
650 SOLUTION ORAL EVERY 4 HOURS PRN
Status: DISCONTINUED | OUTPATIENT
Start: 2023-01-03 | End: 2023-01-04 | Stop reason: HOSPADM

## 2023-01-03 RX ORDER — SODIUM CHLORIDE 0.9 % (FLUSH) 0.9 %
10 SYRINGE (ML) INJECTION EVERY 12 HOURS SCHEDULED
Status: DISCONTINUED | OUTPATIENT
Start: 2023-01-03 | End: 2023-01-04 | Stop reason: HOSPADM

## 2023-01-03 RX ORDER — METOPROLOL TARTRATE 50 MG/1
50 TABLET, FILM COATED ORAL ONCE
Status: DISCONTINUED | OUTPATIENT
Start: 2023-01-03 | End: 2023-01-04 | Stop reason: HOSPADM

## 2023-01-03 RX ORDER — SODIUM CHLORIDE 9 MG/ML
100 INJECTION, SOLUTION INTRAVENOUS CONTINUOUS
Status: DISCONTINUED | OUTPATIENT
Start: 2023-01-03 | End: 2023-01-03

## 2023-01-03 RX ORDER — IPRATROPIUM BROMIDE AND ALBUTEROL SULFATE 2.5; .5 MG/3ML; MG/3ML
3 SOLUTION RESPIRATORY (INHALATION)
Status: DISCONTINUED | OUTPATIENT
Start: 2023-01-03 | End: 2023-01-04 | Stop reason: HOSPADM

## 2023-01-03 RX ORDER — PANTOPRAZOLE SODIUM 40 MG/1
40 TABLET, DELAYED RELEASE ORAL
Status: DISCONTINUED | OUTPATIENT
Start: 2023-01-03 | End: 2023-01-04 | Stop reason: HOSPADM

## 2023-01-03 RX ORDER — PREDNISONE 20 MG/1
40 TABLET ORAL
Status: DISCONTINUED | OUTPATIENT
Start: 2023-01-03 | End: 2023-01-04 | Stop reason: HOSPADM

## 2023-01-03 RX ORDER — FUROSEMIDE 40 MG/1
40 TABLET ORAL DAILY
Status: DISCONTINUED | OUTPATIENT
Start: 2023-01-03 | End: 2023-01-04 | Stop reason: HOSPADM

## 2023-01-03 RX ORDER — ACETAMINOPHEN 325 MG/1
650 TABLET ORAL EVERY 4 HOURS PRN
Status: DISCONTINUED | OUTPATIENT
Start: 2023-01-03 | End: 2023-01-04 | Stop reason: HOSPADM

## 2023-01-03 RX ORDER — NITROGLYCERIN 0.4 MG/1
0.4 TABLET SUBLINGUAL
Status: DISCONTINUED | OUTPATIENT
Start: 2023-01-03 | End: 2023-01-04 | Stop reason: HOSPADM

## 2023-01-03 RX ORDER — PAROXETINE HYDROCHLORIDE 20 MG/1
40 TABLET, FILM COATED ORAL EVERY MORNING
Status: DISCONTINUED | OUTPATIENT
Start: 2023-01-03 | End: 2023-01-04 | Stop reason: HOSPADM

## 2023-01-03 RX ORDER — ASPIRIN 81 MG/1
324 TABLET, CHEWABLE ORAL ONCE
Status: COMPLETED | OUTPATIENT
Start: 2023-01-03 | End: 2023-01-03

## 2023-01-03 RX ORDER — METOPROLOL TARTRATE 50 MG/1
50 TABLET, FILM COATED ORAL 2 TIMES DAILY
Status: DISCONTINUED | OUTPATIENT
Start: 2023-01-03 | End: 2023-01-03

## 2023-01-03 RX ORDER — ATORVASTATIN CALCIUM 20 MG/1
40 TABLET, FILM COATED ORAL NIGHTLY
Status: DISCONTINUED | OUTPATIENT
Start: 2023-01-03 | End: 2023-01-04 | Stop reason: HOSPADM

## 2023-01-03 RX ORDER — BUDESONIDE AND FORMOTEROL FUMARATE DIHYDRATE 160; 4.5 UG/1; UG/1
2 AEROSOL RESPIRATORY (INHALATION)
Status: DISCONTINUED | OUTPATIENT
Start: 2023-01-03 | End: 2023-01-04 | Stop reason: HOSPADM

## 2023-01-03 RX ORDER — METHYLPREDNISOLONE SODIUM SUCCINATE 125 MG/2ML
125 INJECTION, POWDER, LYOPHILIZED, FOR SOLUTION INTRAMUSCULAR; INTRAVENOUS ONCE
Status: CANCELLED | OUTPATIENT
Start: 2023-01-03

## 2023-01-03 RX ADMIN — CEFTRIAXONE SODIUM 1 G: 1 INJECTION, POWDER, FOR SOLUTION INTRAMUSCULAR; INTRAVENOUS at 04:20

## 2023-01-03 RX ADMIN — IOPAMIDOL 95 ML: 755 INJECTION, SOLUTION INTRAVENOUS at 00:34

## 2023-01-03 RX ADMIN — FUROSEMIDE 40 MG: 40 TABLET ORAL at 13:49

## 2023-01-03 RX ADMIN — Medication 10 ML: at 10:24

## 2023-01-03 RX ADMIN — GUAIFENESIN 600 MG: 600 TABLET, EXTENDED RELEASE ORAL at 11:46

## 2023-01-03 RX ADMIN — PRAMIPEXOLE DIHYDROCHLORIDE 3 MG: 1.5 TABLET ORAL at 20:48

## 2023-01-03 RX ADMIN — ATORVASTATIN CALCIUM 40 MG: 20 TABLET, FILM COATED ORAL at 20:48

## 2023-01-03 RX ADMIN — PANTOPRAZOLE SODIUM 40 MG: 40 TABLET, DELAYED RELEASE ORAL at 17:25

## 2023-01-03 RX ADMIN — SODIUM CHLORIDE 100 ML/HR: 9 INJECTION, SOLUTION INTRAVENOUS at 04:38

## 2023-01-03 RX ADMIN — FLUTICASONE PROPIONATE 2 SPRAY: 50 SPRAY, METERED NASAL at 11:46

## 2023-01-03 RX ADMIN — GUAIFENESIN 600 MG: 600 TABLET, EXTENDED RELEASE ORAL at 20:48

## 2023-01-03 RX ADMIN — METOPROLOL TARTRATE 100 MG: 50 TABLET, FILM COATED ORAL at 20:48

## 2023-01-03 RX ADMIN — AZITHROMYCIN 500 MG: 500 INJECTION, POWDER, LYOPHILIZED, FOR SOLUTION INTRAVENOUS at 04:31

## 2023-01-03 RX ADMIN — METOPROLOL TARTRATE 50 MG: 50 TABLET, FILM COATED ORAL at 11:49

## 2023-01-03 RX ADMIN — IPRATROPIUM BROMIDE AND ALBUTEROL SULFATE 3 ML: 2.5; .5 SOLUTION RESPIRATORY (INHALATION) at 16:16

## 2023-01-03 RX ADMIN — BUDESONIDE AND FORMOTEROL FUMARATE DIHYDRATE 2 PUFF: 160; 4.5 AEROSOL RESPIRATORY (INHALATION) at 19:30

## 2023-01-03 RX ADMIN — Medication 10 ML: at 20:49

## 2023-01-03 RX ADMIN — PAROXETINE HYDROCHLORIDE HEMIHYDRATE 40 MG: 20 TABLET, FILM COATED ORAL at 11:46

## 2023-01-03 RX ADMIN — ASPIRIN 324 MG: 81 TABLET, CHEWABLE ORAL at 04:31

## 2023-01-03 RX ADMIN — PREDNISONE 40 MG: 20 TABLET ORAL at 12:51

## 2023-01-03 RX ADMIN — ENOXAPARIN SODIUM 100 MG: 100 INJECTION SUBCUTANEOUS at 11:46

## 2023-01-03 RX ADMIN — ENOXAPARIN SODIUM 100 MG: 100 INJECTION SUBCUTANEOUS at 04:31

## 2023-01-03 RX ADMIN — ENOXAPARIN SODIUM 100 MG: 100 INJECTION SUBCUTANEOUS at 23:40

## 2023-01-03 NOTE — ED TRIAGE NOTES
Patient to ED per EMS from home w/ reports of SOA x2 weeks, increased tonight, and swelling to both legs. SOA worse w/ exertion. Per EMS, patient was 88-90% on room air on scene; placed on 2L O2. Patient w/ history of HF, on Lasix, dose has recently been changed. Per EMS, wife on scene states patient has had \"flu-like symptoms\" for the past few days.    Patient and staff w/ appropriate PPE in place throughout encounter.

## 2023-01-03 NOTE — H&P
Patient Name:  Jose Hurtado  YOB: 1950  MRN:  [id number]  Admit Date:  1/2/2023  Patient Care Team:  Brandin Bolton MD as PCP - General (Internal Medicine)  Code, George THORPE II, MD as Consulting Physician (Hematology and Oncology)  Jose Inman MD as Consulting Physician (Urology)  Mulugeta Millan MD as Consulting Physician (Gastroenterology)  oJse Christine III, MD as Referring Physician (Thoracic Surgery)  Seth Randhawa MD as Consulting Physician (Ophthalmology)  James Cyr MD as Consulting Physician (Cardiology)      Subjective   History Present Illness     Chief Complaint   Patient presents with   • Shortness of Breath   • Leg Swelling       Mr. Hurtado is a 72 y.o. former smoker with a history of antiphospholipid syndrome on chronic anticoagulation, atrial fibrillation, hypertension, hearing loss, lymphedema BLE, DVT, esophageal carcinoma that presents to [facility] complaining of shortness of breath and leg swelling.    Lives alone.  States a couple days ago feeling unwell with cough, chills, & shortness of breath; therefore, went to [facility] ER for evaluation.    Denies use of supplemental oxygen at home, chest pain or history of CAD.    Serial troponin positive--treatment dose enoxaparin and aspirin provided in ER.    Recommendation pending hospital course.  Details below.    History of Present Illness  Review of Systems   Constitutional: Positive for activity change and chills. Negative for appetite change and fever.   HENT: Negative for congestion and rhinorrhea.    Respiratory: Positive for cough and shortness of breath.    Cardiovascular: Negative for chest pain and leg swelling.   Gastrointestinal: Negative for constipation, diarrhea, nausea and vomiting.   Endocrine: Negative for polydipsia, polyphagia and polyuria.   Genitourinary: Positive for frequency. Negative for difficulty urinating and dysuria.   Musculoskeletal: Positive for gait  problem and myalgias.   Skin: Negative for rash and wound.   Neurological: Negative for syncope and light-headedness.   Psychiatric/Behavioral: Negative for confusion and hallucinations.        Personal History     Past Medical History:   Diagnosis Date   • Acute deep vein thrombosis (DVT) of popliteal vein of left lower extremity (HCC) 03/24/2020   • Anemia    • Anti-phospholipid syndrome (HCC)     On lifelong anticoagulation therapy   • Bladder disorder     LEAKAGE   ON MED  wers pads   • Bleeding hemorrhoids    • Community acquired pneumonia     HISTORY OF IN 2014   • Deep venous thrombosis (HCC) 2006, 2008    Left lower extremity multiple   • Depression    • Esophageal carcinoma (HCC) 12/31/2014    had chemo and radiation prior surgery   • Hemorrhoids    • HH (hiatus hernia)    • History of atrial fibrillation 2015    ONE EPISODE WHILE HOSPITALIZED   • History of kidney stones    • History of nephrolithiasis    • History of pancreatitis     PT STATES MANY YEARS AGO   • History of radiation therapy    • History of transfusion    • Hypertension    • Long-term (current) use of anticoagulants, INR goal 2.0-3.0    • Lymphedema    • Malignant neoplasm of prostate (HCC)    • Other hyperlipidemia 01/30/2018 January 30, 2018 lipid panel risk 12.8%   • Restless legs syndrome    • Sleep apnea     OCCASIONALLY WEARS CPAP   • Squamous carcinoma     on the head     Past Surgical History:   Procedure Laterality Date   • APPENDECTOMY  1950   • BRONCHOSCOPY      (Diagnostic)   • CARDIAC CATHETERIZATION N/A 5/14/2022    Procedure: Left Heart Cath;  Surgeon: Eric So MD;  Location: [facility] CATH INVASIVE LOCATION;  Service: Cardiology;  Laterality: N/A;   • CARDIAC CATHETERIZATION N/A 5/14/2022    Procedure: Coronary angiography;  Surgeon: Eric So MD;  Location: [facility] CATH INVASIVE LOCATION;  Service: Cardiology;  Laterality: N/A;   • CARDIAC CATHETERIZATION N/A 5/14/2022    Procedure: Left  ventriculography;  Surgeon: Eric So MD;  Location: [facility] CATH INVASIVE LOCATION;  Service: Cardiology;  Laterality: N/A;   • CATARACT EXTRACTION Bilateral 2014   • COLONOSCOPY  12/15/2014    Complete / Description: EH, IH, torts, stool, follow-up colonoscopy due in 5 years.   • COLONOSCOPY N/A 6/13/2017    non-thrombosed external hemorrhoids, normal examined ileum, IH   • COLONOSCOPY N/A 2/26/2019    Procedure: COLONOSCOPY with Cold Polypectomy;  Surgeon: Mulugeta Millan MD;  Location: [facility] ENDOSCOPY;  Service: Gastroenterology   • COLONOSCOPY N/A 12/16/2020    Procedure: COLONOSCOPY to cecum into TI;  Surgeon: Mesha Sanchez MD;  Location: [facility] ENDOSCOPY;  Service: Gastroenterology;  Laterality: N/A;  pre: lower GI bleed  post: hemorrhoids    • CYSTOSCOPY W/ LASER LITHOTRIPSY     • ENDOSCOPY N/A 6/13/2017    Procedure: ESOPHAGOGASTRODUODENOSCOPY WITH COLD BIOPSY;  Surgeon: Lake Gonzalez MD;  Location: [facility] ENDOSCOPY;  Service:    • ENDOSCOPY N/A 11/18/2021    Procedure: ESOPHAGOGASTRODUODENOSCOPY WITH  DILATATION WITH FLUOROSCOPY;  Surgeon: Jose Christine III, MD;  Location: [facility] ENDOSCOPY;  Service: Gastroenterology;  Laterality: N/A;  Pre: dysphagia, h/x of adinocarcinoma of esophagus  Post: same   • ESOPHAGECTOMY      April 2015, stage IIB esophageal carcinoma, sub-total resection.   • ESOPHAGECTOMY      Esophagectomy Subtotal [facility] [facility] Procedure   • EXCISION LESION  08/2012    Removal of Squamous Cell CA on Head   • HAMMER TOE REPAIR  09/2014    Hammertoe Operation (Each Toe), 10/2014   • HAMMER TOE REPAIR Left 10/3/2017    Procedure: Left second third and fourth distal interphalangeal joint resection with flexor tenotomy;  Surgeon: Mulugeta Lira MD;  Location: [facility] OR OSC;  Service:    • HEMORRHOIDECTOMY N/A 12/23/2020    Procedure: HEMORRHOIDECTOMY;  Surgeon: Billy Julio Jr., MD;  Location: [facility] MAIN OR;  Service: General;  Laterality: N/A;   •  HERNIA REPAIR      incisional   • JEJUNOSTOMY      Laparoscopic   • JEJUNOSTOMY      tube removal    • KNEE SURGERY Bilateral , ,    • PATELLA SURGERY Left     removed   • PILONIDAL CYST / SINUS EXCISION     • HI ARTHRP KNE CONDYLE&PLATU MEDIAL&LAT COMPARTMENTS Right 3/26/2018    Procedure: TOTAL KNEE ARTHROPLASTY;  Surgeon: Renny Solis MD;  Location: [facility] OR;  Service: Orthopedics   • PROSTATECTOMY     • PYLOROPLASTY     • SPINAL FUSION  1998    C 5,6   • TONSILLECTOMY     • UPPER GASTROINTESTINAL ENDOSCOPY  12/15/2014    LA Grade D esophagitis, Pardo's, HH, multiple duodenal ulcers   • VENTRAL/INCISIONAL HERNIA REPAIR N/A 2016    Procedure: VENTRAL/INCISIONAL HERNIA REPAIR, open, with mesh, and component separation;  Surgeon: Darren Rivas MD;  Location: [facility] OR;  Service:      Family History   Problem Relation Age of Onset   • Cerebral aneurysm Mother         cerebral artery aneurysm ( age 56)   • Prostate cancer Brother 68   • Anxiety disorder Father    • Suicide Attempts Father          of suicide   • Cancer Father         bladder   • No Known Problems Brother    • Pancreatic cancer Nephew    • Colon cancer Neg Hx    • Esophageal cancer Neg Hx    • Dementia Neg Hx    • Malig Hyperthermia Neg Hx      Social History     Tobacco Use   • Smoking status: Former     Packs/day: 2.00     Years: 3.00     Pack years: 6.00     Types: Cigarettes     Quit date:      Years since quittin.0   • Smokeless tobacco: Former     Types: Chew   • Tobacco comments:     Caffeine - coffee and soda    Vaping Use   • Vaping Use: Never used   Substance Use Topics   • Alcohol use: Yes     Alcohol/week: 3.0 standard drinks     Types: 1 Glasses of wine, 1 Cans of beer, 1 Shots of liquor per week     Comment: 4 drinks/week   • Drug use: Not Currently     Types: Marijuana     Comment: hx marijuana use \"a long time ago\"     No current facility-administered medications on file  prior to encounter.     Current Outpatient Medications on File Prior to Encounter   Medication Sig Dispense Refill   • atorvastatin (LIPITOR) 40 MG tablet Take 1 tablet by mouth Every Night. 90 tablet 3   • cyanocobalamin 1000 MCG/ML injection      • Eliquis 5 MG tablet tablet TAKE ONE TABLET BY MOUTH EVERY 12 HOURS 60 tablet 1   • furosemide (Lasix) 80 MG tablet Take 0.5 tablets by mouth Daily for 30 days. 15 tablet 0   • Gemtesa 75 MG tablet Daily.     • metoprolol tartrate (LOPRESSOR) 25 MG tablet Take 1 tablet by mouth 2 (Two) Times a Day. 180 tablet 3   • pantoprazole (PROTONIX) 40 MG EC tablet TAKE ONE TABLET BY MOUTH TWICE A DAY BEFORE A MEAL 180 tablet 3   • PARoxetine (PAXIL) 40 MG tablet TAKE ONE TABLET BY MOUTH EVERY MORNING 30 tablet 5   • potassium chloride (MICRO-K) 10 MEQ CR capsule Take 1 capsule by mouth Daily. 90 capsule 5   • pramipexole (MIRAPEX) 1.5 MG tablet TAKE ONE TABLET BY MOUTH TWICE A DAY (Patient taking differently: Take 3 mg by mouth Every Night.) 180 tablet 1   • tiotropium bromide monohydrate (Spiriva Respimat) 2.5 MCG/ACT aerosol solution inhaler Inhale 2 puffs Daily. 4 g 2   • albuterol sulfate  (90 Base) MCG/ACT inhaler Inhale 1 puff Every 4 (Four) Hours As Needed for Wheezing.       No Known Allergies    Objective    Objective     Vital Signs  Temp:  [98.4 °F (36.9 °C)-98.8 °F (37.1 °C)] 98.4 °F (36.9 °C)  Heart Rate:  [] 122  Resp:  [16-18] 16  BP: (124-162)/(62-97) 153/92  SpO2:  [91 %-94 %] 93 %  on  Flow (L/min):  [2] 2;   Device (Oxygen Therapy): room air  Body mass index is 24.95 kg/m².    Physical Exam  Constitutional:       General: He is not in acute distress.     Appearance: He is ill-appearing. He is not toxic-appearing.   HENT:      Head: Normocephalic and atraumatic.      Comments: Hearing loss  Eyes:      Extraocular Movements: Extraocular movements intact.      Conjunctiva/sclera: Conjunctivae normal.   Cardiovascular:      Rate and Rhythm: Tachycardia  present.      Heart sounds: Normal heart sounds.   Pulmonary:      Effort: Pulmonary effort is normal.      Breath sounds: Wheezing present.   Abdominal:      General: Bowel sounds are normal.      Palpations: Abdomen is soft.   Musculoskeletal:         General: No tenderness.      Cervical back: Normal range of motion and neck supple.      Right lower leg: Edema present.      Left lower leg: Edema present.   Skin:     General: Skin is warm.      Findings: Erythema (L>R) present.   Neurological:      Mental Status: He is alert and oriented to person, place, and time.      Cranial Nerves: No cranial nerve deficit.   Psychiatric:         Behavior: Behavior normal.         Thought Content: Thought content normal.         Results Review:  I reviewed the patient's new clinical results.  I reviewed the patient's new imaging results and agree with the interpretation.  I reviewed the patient's other test results and agree with the interpretation  I personally viewed and interpreted the patient's EKG/Telemetry data  Discussed with ED provider.    Lab Results (last 24 hours)     Procedure Component Value Units Date/Time    CBC & Differential [[id number]]  (Abnormal) Collected: 01/02/23 2321    Specimen: Blood from Arm, Left Updated: 01/02/23 2331    Narrative:      The following orders were created for panel order CBC & Differential.  Procedure                               Abnormality         Status                     ---------                               -----------         ------                     CBC Auto Differential[[id number]]        Abnormal            Final result                 Please view results for these tests on the individual orders.    Comprehensive Metabolic Panel [[id number]]  (Abnormal) Collected: 01/02/23 2321    Specimen: Blood from Arm, Left Updated: 01/02/23 2352     Glucose 115 mg/dL      BUN 18 mg/dL      Creatinine 1.47 mg/dL      Sodium 137 mmol/L      Potassium 4.2 mmol/L      Chloride 104  mmol/L      CO2 23.5 mmol/L      Calcium 8.8 mg/dL      Total Protein 7.0 g/dL      Albumin 3.8 g/dL      ALT (SGPT) 18 U/L      AST (SGOT) 25 U/L      Alkaline Phosphatase 114 U/L      Total Bilirubin 0.6 mg/dL      Globulin 3.2 gm/dL      A/G Ratio 1.2 g/dL      BUN/Creatinine Ratio 12.2     Anion Gap 9.5 mmol/L      eGFR 49.7 mL/min/1.73      Comment: National Kidney Foundation and American Society of Nephrology (ASN) Task Force recommended calculation based on the Chronic Kidney Disease Epidemiology Collaboration (CKD-EPI) equation refit without adjustment for race.       Narrative:      GFR Normal >60  Chronic Kidney Disease <60  Kidney Failure <15    The GFR formula is only valid for adults with stable renal function between ages 18 and 70.    BNP [[id number]]  (Normal) Collected: 01/02/23 2321    Specimen: Blood from Arm, Left Updated: 01/02/23 2351     proBNP 825.0 pg/mL     Narrative:      Among patients with dyspnea, NT-proBNP is highly sensitive for the detection of acute congestive heart failure. In addition NT-proBNP of <300 pg/ml effectively rules out acute congestive heart failure with 99% negative predictive value.    Results may be falsely decreased if patient taking Biotin.      Troponin [[id number]]  (Abnormal) Collected: 01/02/23 2321    Specimen: Blood from Arm, Left Updated: 01/02/23 2358     Troponin T 0.097 ng/mL     Narrative:      Troponin T Reference Range:  <= 0.03 ng/mL-   Negative for AMI  >0.03 ng/mL-     Abnormal for myocardial necrosis.  Clinicians would have to utilize clinical acumen, EKG, Troponin and serial changes to determine if it is an Acute Myocardial Infarction or myocardial injury due to an underlying chronic condition.       Results may be falsely decreased if patient taking Biotin.      CBC Auto Differential [[id number]]  (Abnormal) Collected: 01/02/23 2321    Specimen: Blood from Arm, Left Updated: 01/02/23 2331     WBC 2.95 10*3/mm3      RBC 3.75 10*6/mm3      Hemoglobin  11.4 g/dL      Hematocrit 33.3 %      MCV 88.8 fL      MCH 30.4 pg      MCHC 34.2 g/dL      RDW 13.1 %      RDW-SD 43.0 fl      MPV 9.1 fL      Platelets 118 10*3/mm3      Neutrophil % 64.2 %      Lymphocyte % 22.0 %      Monocyte % 11.5 %      Eosinophil % 1.7 %      Basophil % 0.3 %      Immature Grans % 0.3 %      Neutrophils, Absolute 1.89 10*3/mm3      Lymphocytes, Absolute 0.65 10*3/mm3      Monocytes, Absolute 0.34 10*3/mm3      Eosinophils, Absolute 0.05 10*3/mm3      Basophils, Absolute 0.01 10*3/mm3      Immature Grans, Absolute 0.01 10*3/mm3      nRBC 0.0 /100 WBC     Troponin [[id number]]  (Abnormal) Collected: 01/03/23 0128    Specimen: Blood from Arm, Left Updated: 01/03/23 0400     Troponin T 0.111 ng/mL     Narrative:      Troponin T Reference Range:  <= 0.03 ng/mL-   Negative for AMI  >0.03 ng/mL-     Abnormal for myocardial necrosis.  Clinicians would have to utilize clinical acumen, EKG, Troponin and serial changes to determine if it is an Acute Myocardial Infarction or myocardial injury due to an underlying chronic condition.       Results may be falsely decreased if patient taking Biotin.      Blood Culture - Blood, Arm, Right [[id number]] Collected: 01/03/23 0418    Specimen: Blood from Arm, Right Updated: 01/03/23 0423    Blood Culture - Blood, Arm, Left [[id number]] Collected: 01/03/23 0418    Specimen: Blood from Arm, Left Updated: 01/03/23 0423    Lactic Acid, Plasma [[id number]]  (Normal) Collected: 01/03/23 0418    Specimen: Blood Updated: 01/03/23 0445     Lactate 0.7 mmol/L     Troponin [[id number]]  (Abnormal) Collected: 01/03/23 0418    Specimen: Blood Updated: 01/03/23 0451     Troponin T 0.095 ng/mL     Narrative:      Troponin T Reference Range:  <= 0.03 ng/mL-   Negative for AMI  >0.03 ng/mL-     Abnormal for myocardial necrosis.  Clinicians would have to utilize clinical acumen, EKG, Troponin and serial changes to determine if it is an Acute Myocardial Infarction or myocardial injury  due to an underlying chronic condition.       Results may be falsely decreased if patient taking Biotin.      Procalcitonin [[id number]]  (Normal) Collected: 01/03/23 0418    Specimen: Blood Updated: 01/03/23 1010     Procalcitonin 0.15 ng/mL     Narrative:      As a Marker for Sepsis (Non-Neonates):    1. <0.5 ng/mL represents a low risk of severe sepsis and/or septic shock.  2. >2 ng/mL represents a high risk of severe sepsis and/or septic shock.    As a Marker for Lower Respiratory Tract Infections that require antibiotic therapy:    PCT on Admission    Antibiotic Therapy       6-12 Hrs later    >0.5                Strongly Recommended  >0.25 - <0.5        Recommended   0.1 - 0.25          Discouraged              Remeasure/reassess PCT  <0.1                Strongly Discouraged     Remeasure/reassess PCT    As 28 day mortality risk marker: \"Change in Procalcitonin Result\" (>80% or <=80%) if Day 0 (or Day 1) and Day 4 values are available. Refer to http://www.[employer]-pct-calculator.com    Change in PCT <=80%  A decrease of PCT levels below or equal to 80% defines a positive change in PCT test result representing a higher risk for 28-day all-cause mortality of patients diagnosed with severe sepsis for septic shock.    Change in PCT >80%  A decrease of PCT levels of more than 80% defines a negative change in PCT result representing a lower risk for 28-day all-cause mortality of patients diagnosed with severe sepsis or septic shock.       Respiratory Panel PCR w/COVID-19(SARS-CoV-2) TONIE/HOWARD/CARMEN/PAD/COR/MAD/ALEXANDER In-House, NP Swab in [facility] 3-4 HR TAT - Swab, Nasopharynx [[id number]]  (Abnormal) Collected: 01/03/23 0734    Specimen: Swab from Nasopharynx Updated: 01/03/23 0911     ADENOVIRUS, PCR Not Detected     Coronavirus 229E Not Detected     Coronavirus HKU1 Not Detected     Coronavirus NL63 Not Detected     Coronavirus OC43 Not Detected     COVID19 Not Detected     Human Metapneumovirus Not Detected     Human  Rhinovirus/Enterovirus Not Detected     Influenza A PCR Not Detected     Influenza B PCR Not Detected     Parainfluenza Virus 1 Not Detected     Parainfluenza Virus 2 Not Detected     Parainfluenza Virus 3 Not Detected     Parainfluenza Virus 4 Not Detected     RSV, PCR Detected     Bordetella pertussis pcr Not Detected     Bordetella parapertussis PCR Not Detected     Chlamydophila pneumoniae PCR Not Detected     Mycoplasma pneumo by PCR Not Detected    Narrative:      In the setting of a positive respiratory panel with a viral infection PLUS a negative procalcitonin without other underlying concern for bacterial infection, consider observing off antibiotics or discontinuation of antibiotics and continue supportive care. If the respiratory panel is positive for atypical bacterial infection (Bordetella pertussis, Chlamydophila pneumoniae, or Mycoplasma pneumoniae), consider antibiotic de-escalation to target atypical bacterial infection.    Troponin [[id number]]  (Abnormal) Collected: 01/03/23 0827    Specimen: Blood Updated: 01/03/23 0954     Troponin T 0.097 ng/mL     Narrative:      Troponin T Reference Range:  <= 0.03 ng/mL-   Negative for AMI  >0.03 ng/mL-     Abnormal for myocardial necrosis.  Clinicians would have to utilize clinical acumen, EKG, Troponin and serial changes to determine if it is an Acute Myocardial Infarction or myocardial injury due to an underlying chronic condition.       Results may be falsely decreased if patient taking Biotin.      Basic Metabolic Panel [[id number]]  (Abnormal) Collected: 01/03/23 0827    Specimen: Blood Updated: 01/03/23 0949     Glucose 82 mg/dL      BUN 17 mg/dL      Creatinine 1.23 mg/dL      Sodium 138 mmol/L      Potassium 4.1 mmol/L      Chloride 107 mmol/L      CO2 19.2 mmol/L      Calcium 8.0 mg/dL      BUN/Creatinine Ratio 13.8     Anion Gap 11.8 mmol/L      eGFR 61.6 mL/min/1.73      Comment: National Kidney Foundation and American Society of Nephrology (ASN)  Task Force recommended calculation based on the Chronic Kidney Disease Epidemiology Collaboration (CKD-EPI) equation refit without adjustment for race.       Narrative:      GFR Normal >60  Chronic Kidney Disease <60  Kidney Failure <15    The GFR formula is only valid for adults with stable renal function between ages 18 and 70.    CBC Auto Differential [[id number]]  (Abnormal) Collected: 01/03/23 0827    Specimen: Blood Updated: 01/03/23 0919     WBC 2.39 10*3/mm3      RBC 3.87 10*6/mm3      Hemoglobin 11.5 g/dL      Hematocrit 33.9 %      MCV 87.6 fL      MCH 29.7 pg      MCHC 33.9 g/dL      RDW 13.0 %      RDW-SD 41.6 fl      MPV 9.1 fL      Platelets 125 10*3/mm3      Neutrophil % 53.5 %      Lymphocyte % 29.7 %      Monocyte % 11.3 %      Eosinophil % 3.8 %      Basophil % 0.4 %      Immature Grans % 1.3 %      Neutrophils, Absolute 1.28 10*3/mm3      Lymphocytes, Absolute 0.71 10*3/mm3      Monocytes, Absolute 0.27 10*3/mm3      Eosinophils, Absolute 0.09 10*3/mm3      Basophils, Absolute 0.01 10*3/mm3      Immature Grans, Absolute 0.03 10*3/mm3      nRBC 0.0 /100 WBC     Protime-INR [[id number]]  (Abnormal) Collected: 01/03/23 0827    Specimen: Blood Updated: 01/03/23 0933     Protime 16.3 Seconds      INR 1.30          Imaging Results (Last 24 Hours)     Procedure Component Value Units Date/Time    CT Angiogram Chest [[id number]] Collected: 01/03/23 0330     Updated: 01/03/23 0330    Narrative:        Patient: JJ BOURGEOIS  Time Out: 03:30  Exam(s): CTA CHEST     EXAM:    CT Angiography Chest With Intravenous Contrast    CLINICAL HISTORY:     Reason for exam: soa r o PE.    TECHNIQUE:    Axial computed tomographic angiography images of the chest with   intravenous contrast.  CTDI is 3.99 mGy and DLP is 180.6 mGy-cm.  This CT   exam was performed according to the principle of ALARA (As Low As   Reasonably Achievable) by using one or more of the following dose   reduction techniques: automated exposure control,  adjustment of the mA   and or kV according to patient size, and or use of iterative   reconstruction technique.    MIP reconstructed images were created and reviewed.    COMPARISON:    CT angiogram chest 5 12 2022.    FINDINGS:    Pulmonary arteries:  Unremarkable.  No pulmonary embolism.    Aorta:  No acute findings.  No thoracic aortic aneurysm.    Lungs:  There is patchy airspace opacities in bilateral lower lobes.    There is architectural distortion in the right lower lobe compatible with   rounded atelectasis.    Pleural space:  Thick-walled right pleural effusion measuring 1.5 cm in   depth is present.  It is similar to prior.  No pneumothorax.    Heart:  Unremarkable.  No cardiomegaly.  No significant pericardial   effusion.  No evidence of RV dysfunction.    Mediastinum:  Postop changes suggestive of an esophagectomy with   gastric pull-up.    Bones joints:  Bones show postoperative changes in the right hemithorax   with thoracotomy involving the right fifth rib.  No acute fracture.  No   dislocation.    Soft tissues:  Unremarkable.    Lymph nodes:  There is mild right adenopathy that is new or more   apparent from the previous exam.    IMPRESSION:         No evidence of pulmonary emboli with chronic postoperative changes in   the mediastinum and right hemithorax showing slightly worse bibasilar   atelectasis in the interval.  Query aspiration pneumonitis.      Impression:          Electronically signed by Harshad Gonzáles MD on 01-03-23 at 0330    XR Chest 1 View [[id number]] Collected: 01/02/23 2358     Updated: 01/03/23 0002    Narrative:      PORTABLE CHEST X-RAY     HISTORY: Shortness of breath. Prior esophagectomy and gastric pull-up.     TECHNIQUE: Portable chest x-ray correlated with CT angiogram chest May  12, 2022.     FINDINGS: Postoperative change from gastric pull-up as was previously  demonstrated. The right thoracic cage is noted as is pleural thickening  laterally on the right. Left heart  and mediastinal contours appear  normal. Left lung is clear. No new or acute pulmonary abnormality  identified.       Impression:      Postoperative change in the chest. No acute abnormality  identified.     This report was finalized on 1/2/2023 11:59 PM by Dr. Darian Howell M.D.             Results for orders placed during the hospital encounter of 05/12/22    Adult Transthoracic Echo Complete w/ Color, Spectral and Contrast if necessary per protocol    Interpretation Summary  · Estimated right ventricular systolic pressure from tricuspid regurgitation is mildly elevated (35-45 mmHg). Calculated right ventricular systolic pressure from tricuspid regurgitation is 43 mmHg.  · Left ventricular wall thickness is consistent with mild concentric hypertrophy.  · Estimated left ventricular EF = 56% Left ventricular systolic function is normal.  · Left ventricular diastolic function was normal.      ECG 12 Lead Tachycardia   Preliminary Result   HEART RATE= 121  bpm   RR Interval= 496  ms   IL Interval=   ms   P Horizontal Axis=   deg   P Front Axis=   deg   QRSD Interval= 99  ms   QT Interval= 363  ms   QRS Axis= 14  deg   T Wave Axis= 71  deg   - ABNORMAL ECG -   Atrial flutter with predominant 2:1 AV block   Prolonged QT interval   Artifact in lead(s) I,aVR,aVL,V1,V2,V6   Electronically Signed By:    Date and Time of Study: 2023-01-03 07:22:39      ECG 12 Lead Dyspnea   Preliminary Result   HEART RATE= 79  bpm   RR Interval= 759  ms   IL Interval= 189  ms   P Horizontal Axis= -24  deg   P Front Axis= 10  deg   QRSD Interval= 130  ms   QT Interval= 394  ms   QRS Axis= -28  deg   T Wave Axis= -89  deg   - ABNORMAL ECG -   Sinus rhythm   Right atrial enlargement   Nonspecific intraventricular conduction delay   Repol abnrm suggests ischemia, inferior leads   Borderline ST elevation, anterolateral leads   Artifact in lead(s) I,II,III,aVF,V1,V2,V3,V4,V5,V6   Electronically Signed By:    Date and Time of Study: 2023-01-02  23:34:22           Assessment/Plan     Active Hospital Problems    Diagnosis  POA   • **Elevated troponin [R77.8]  Yes   • RSV (acute bronchiolitis due to respiratory syncytial virus) [J21.0]  Yes   • Atrial fibrillation (HCC) [I48.91]  Yes   • Dyspnea [R06.00]  Yes   • CKD (chronic kidney disease) stage 2, GFR 60-89 ml/min [N18.2]  Yes   • Chronic anticoagulation [Z79.01]  Not Applicable   • Chronic obstructive pulmonary disease with acute exacerbation (HCC) [J44.1]  Yes   • Iron deficiency [E61.1]  Yes   • Anemia [D64.9]  Yes   • Hypertension [I10]  Yes   • History of DVT (deep vein thrombosis) [Z86.718]  Not Applicable   • History of esophageal cancer [Z85.01]  Yes   • RLS (restless legs syndrome) [G25.81]  Yes      Resolved Hospital Problems   No resolved problems to display.       Mr. Hurtado is a 72 y.o. former smoker with a history of antiphospholipid syndrome on chronic anticoagulation, atrial fibrillation, hypertension, hearing loss, lymphedema BLE, DVT, esophageal carcinoma, prostate cancer that presents to [facility] complaining of shortness of breath and leg swelling & admitted for elevated troponin.      Elevated troponin: Denies current or recent chest pain.  Does not appear with ACS.  Cardiology following noted chronically elevated troponin with normal heart catheterization in May.  No additional cardiac work-up planned at this time.  ASA, statin continued on admission.      Urinary frequency: Ordering urinalysis with culture if indicated.      Chronic obstructive pulmonary disease with acute exacerbation (HCC): Most likely contributing to above due to RSV confirmed on PCR.  Provide prednisone burst, Mucinex twice daily.  Encourage incentive spirometry.  Scheduled and bronchodilators as needed.  Inhaled corticosteroid/LABA.  Flonase.  Consult PT/OT.       Atrial fibrillation (HCC) /chronic anticoagulation: Rate greater than optimal at present.  Plan rate controlled with increased  beta-blocker strength per cardiology.  Anticoagulation continued.      History of esophageal cancer: Denies dysphagia.  HOB > 30.      RLS (restless legs syndrome): Stable appearance.  Mirapex continued nightly.      History of DVT (deep vein thrombosis) / lymphedema: Denies pain.  Reportedly adherent to anticoagulation prior to hospital admission.  2-3+ pitting edema, BLE (resume diuretic per home dose regimen).      Hypertension: BP acceptable acutely.  Beta-blocker increased per cardiology.  Monitor blood pressure for changes.      Anemia /  Iron deficiency:  Serum Hgb stable with trend.  No overt signs of active bleeding.  Labs in a.m.      CKD (chronic kidney disease) stage 2, GFR 60-89 ml/min: Creatinine 1.47 similar compared to priors and improved following gentle IV fluid hydration (stopped for now to avoid fluid volume overload given dietary tolerance).      RSV (acute bronchiolitis due to respiratory syncytial virus): Status post azithromycin and ceftriaxone in ER.  Monitor patient off empiric antibiotic therapy for now given unremarkable procalcitonin.  See plan above.      · I discussed the patient's findings and my recommendations with patient, RN, & Dr. Ortiz.    VTE Prophylaxis - therapeutic enoxaparin  Code Status - Full code.       FAYE Foss  [facility]  01/03/23  12:56 EST

## 2023-01-03 NOTE — ED PROVIDER NOTES
EMERGENCY DEPARTMENT ENCOUNTER    Room Number:  15/15  Date seen:  1/3/2023  PCP: Brandin Bolton MD      HPI:  Chief Complaint: Shortness of breath  A complete HPI/ROS/PMH/PSH/SH/FH are unobtainable due to: None  Context: Jose Hurtado is a 72 y.o. male who presents to the ED c/o shortness of breath.  Onset 3 weeks ago.  He does not have dyspnea at rest.  Symptoms are very profound when he tries to exert himself.  He normally can walk without difficulty.  Now he can only walk a few steps and then he becomes very winded.  He denies have any associated chest pain or fever.  Wife has noticed that he has had chest congestion over the past 2 days.          PAST MEDICAL HISTORY  Active Ambulatory Problems     Diagnosis Date Noted   • History of esophageal cancer 02/11/2016   • Adenomatous polyp of colon 02/11/2016   • Malignant neoplasm of prostate (HCC) 02/11/2016   • RLS (restless legs syndrome) 02/11/2016   • History of DVT (deep vein thrombosis) 03/22/2016   • Recurrent major depressive disorder, in partial remission ([facility]) 01/26/2017   • Hypertension 04/12/2018   • Anemia 04/18/2019   • Insomnia due to other mental disorder 06/06/2019   • Peripheral polyneuropathy 06/06/2019   • B12 deficiency 06/07/2019   • Postsurgical malabsorption 10/25/2019   • Lymphedema 01/14/2020   • Iron deficiency 02/10/2020   • Gastroparesis 02/15/2020   • Acute deep vein thrombosis (DVT) of popliteal vein of left lower extremity ([facility]) 03/24/2020   • Tremor of both hands 07/27/2020   • Gastrointestinal hemorrhage 12/15/2020   • Acute blood loss anemia 12/16/2020   • Pancytopenia ([facility]) 12/16/2020   • Chronic venous hypertension due to DVT 12/16/2020   • COPD (chronic obstructive pulmonary disease) ([facility]) 12/16/2020   • Bleeding hemorrhoid 12/17/2020   • Malabsorption of iron 01/05/2021   • Iron deficiency anemia 01/05/2021   • Pleuritic chest pain 05/11/2021   • History of esophageal cancer 05/11/2021   • Abnormal CT scan 05/11/2021   •  Generalized weakness 05/11/2021   • Chronic anticoagulation 05/11/2021   • Urinary tract infection due to extended-spectrum beta lactamase (ESBL) producing Escherichia coli 05/11/2021   • CKD (chronic kidney disease) stage 2, GFR 60-89 ml/min 05/12/2021   • Dysphagia 11/10/2021   • PVC's (premature ventricular contractions) 03/16/2022   • NSTEMI (non-ST elevated myocardial infarction) ([facility]) 05/12/2022   • Bronchitis 05/13/2022   • Dyspnea 12/16/2022     Resolved Ambulatory Problems     Diagnosis Date Noted   • Dysphagia 02/11/2016   • Duodenal ulcer 02/11/2016   • Decreased body weight 02/11/2016   • Cough    • Hyperkalemia 02/15/2016   • DVT (deep venous thrombosis) ([facility]) 08/24/2017   • Other hyperlipidemia 01/30/2018   • Anti-phospholipid syndrome ([facility]) 05/10/2018     Past Medical History:   Diagnosis Date   • Bladder disorder    • Bleeding hemorrhoids    • Community acquired pneumonia    • Deep venous thrombosis ([facility]) 2006, 2008   • Depression    • Esophageal carcinoma ([facility]) 12/31/2014   • Hemorrhoids    • HH (hiatus hernia)    • History of atrial fibrillation 2015   • History of kidney stones    • History of nephrolithiasis    • History of pancreatitis    • History of radiation therapy    • History of transfusion    • Long-term (current) use of anticoagulants, INR goal 2.0-3.0    • Restless legs syndrome    • Sleep apnea    • Squamous carcinoma          PAST SURGICAL HISTORY  Past Surgical History:   Procedure Laterality Date   • APPENDECTOMY  1950   • BRONCHOSCOPY      (Diagnostic)   • CARDIAC CATHETERIZATION N/A 5/14/2022    Procedure: Left Heart Cath;  Surgeon: Eric So MD;  Location: [facility] CATH INVASIVE LOCATION;  Service: Cardiology;  Laterality: N/A;   • CARDIAC CATHETERIZATION N/A 5/14/2022    Procedure: Coronary angiography;  Surgeon: Eric So MD;  Location: [facility] CATH INVASIVE LOCATION;  Service: Cardiology;  Laterality: N/A;   • CARDIAC CATHETERIZATION N/A 5/14/2022     Procedure: Left ventriculography;  Surgeon: Eric So MD;  Location: [facility] CATH INVASIVE LOCATION;  Service: Cardiology;  Laterality: N/A;   • CATARACT EXTRACTION Bilateral 2014   • COLONOSCOPY  12/15/2014    Complete / Description: EH, IH, torts, stool, follow-up colonoscopy due in 5 years.   • COLONOSCOPY N/A 6/13/2017    non-thrombosed external hemorrhoids, normal examined ileum, IH   • COLONOSCOPY N/A 2/26/2019    Procedure: COLONOSCOPY with Cold Polypectomy;  Surgeon: Mulugeta Millan MD;  Location: [facility] ENDOSCOPY;  Service: Gastroenterology   • COLONOSCOPY N/A 12/16/2020    Procedure: COLONOSCOPY to cecum into TI;  Surgeon: Mesha Sanchez MD;  Location: [facility] ENDOSCOPY;  Service: Gastroenterology;  Laterality: N/A;  pre: lower GI bleed  post: hemorrhoids    • CYSTOSCOPY W/ LASER LITHOTRIPSY     • ENDOSCOPY N/A 6/13/2017    Procedure: ESOPHAGOGASTRODUODENOSCOPY WITH COLD BIOPSY;  Surgeon: Lake Gonzalez MD;  Location: [facility] ENDOSCOPY;  Service:    • ENDOSCOPY N/A 11/18/2021    Procedure: ESOPHAGOGASTRODUODENOSCOPY WITH  DILATATION WITH FLUOROSCOPY;  Surgeon: Jose Christine III, MD;  Location: [facility] ENDOSCOPY;  Service: Gastroenterology;  Laterality: N/A;  Pre: dysphagia, h/x of adinocarcinoma of esophagus  Post: same   • ESOPHAGECTOMY      April 2015, stage IIB esophageal carcinoma, sub-total resection.   • ESOPHAGECTOMY      Esophagectomy Subtotal Andrea Joe Procedure   • EXCISION LESION  08/2012    Removal of Squamous Cell CA on Head   • HAMMER TOE REPAIR  09/2014    Hammertoe Operation (Each Toe), 10/2014   • HAMMER TOE REPAIR Left 10/3/2017    Procedure: Left second third and fourth distal interphalangeal joint resection with flexor tenotomy;  Surgeon: Mulugeta Lira MD;  Location: [facility] OR OSC;  Service:    • HEMORRHOIDECTOMY N/A 12/23/2020    Procedure: HEMORRHOIDECTOMY;  Surgeon: Billy Julio Jr., MD;  Location: [facility] MAIN OR;  Service: General;   Laterality: N/A;   • HERNIA REPAIR      incisional   • JEJUNOSTOMY      Laparoscopic   • JEJUNOSTOMY      tube removal    • KNEE SURGERY Bilateral , ,    • PATELLA SURGERY Left     removed   • PILONIDAL CYST / SINUS EXCISION     • CA ARTHRP KNE CONDYLE&PLATU MEDIAL&LAT COMPARTMENTS Right 3/26/2018    Procedure: TOTAL KNEE ARTHROPLASTY;  Surgeon: Renny Solis MD;  Location: [facility];  Service: Orthopedics   • PROSTATECTOMY     • PYLOROPLASTY     • SPINAL FUSION  1998    C 5,6   • TONSILLECTOMY     • UPPER GASTROINTESTINAL ENDOSCOPY  12/15/2014    LA Grade D esophagitis, Pardo's, HH, multiple duodenal ulcers   • VENTRAL/INCISIONAL HERNIA REPAIR N/A 2016    Procedure: VENTRAL/INCISIONAL HERNIA REPAIR, open, with mesh, and component separation;  Surgeon: Darren Rivas MD;  Location: [facility];  Service:          FAMILY HISTORY  Family History   Problem Relation Age of Onset   • Cerebral aneurysm Mother         cerebral artery aneurysm ( age 56)   • Prostate cancer Brother 68   • Anxiety disorder Father    • Suicide Attempts Father          of suicide   • Cancer Father         bladder   • No Known Problems Brother    • Pancreatic cancer Nephew    • Colon cancer Neg Hx    • Esophageal cancer Neg Hx    • Dementia Neg Hx    • Malig Hyperthermia Neg Hx          SOCIAL HISTORY  Social History     Socioeconomic History   • Marital status:      Spouse name: Lena   • Number of children: 0   • Years of education: College   Tobacco Use   • Smoking status: Former     Packs/day: 2.00     Years: 3.00     Pack years: 6.00     Types: Cigarettes     Quit date:      Years since quittin.0   • Smokeless tobacco: Former     Types: Chew   • Tobacco comments:     Caffeine - coffee and soda    Vaping Use   • Vaping Use: Never used   Substance and Sexual Activity   • Alcohol use: Yes     Alcohol/week: 3.0 standard drinks     Types: 1 Glasses of wine, 1 Cans of beer, 1  Shots of liquor per week     Comment: 4 drinks/week   • Drug use: Not Currently     Types: Marijuana     Comment: hx marijuana use \"a long time ago\"   • Sexual activity: Defer         ALLERGIES  Patient has no known allergies.        REVIEW OF SYSTEMS  Review of Systems     All systems reviewed and negative except for those discussed in HPI.       PHYSICAL EXAM  ED Triage Vitals   Temp Heart Rate Resp BP SpO2   01/02/23 2310 01/02/23 2310 01/02/23 2310 01/02/23 2310 01/02/23 2310   98.8 °F (37.1 °C) 82 18 125/67 92 %      Temp src Heart Rate Source Patient Position BP Location FiO2 (%)   01/02/23 2310 01/02/23 2310 01/02/23 2331 01/02/23 2331 --   Oral Monitor Sitting Right arm        Physical Exam      GENERAL: no acute distress  HENT: nares patent  EYES: no scleral icterus  CV: regular rhythm, normal rate  RESPIRATORY: normal effort, poor air movement  ABDOMEN: soft, nontender  MUSCULOSKELETAL: no deformity, 3+ lower extremity edema bilaterally which wife reports is unchanged  NEURO: alert, moves all extremities, follows commands  PSYCH:  calm, cooperative  SKIN: warm, dry    Vital signs and nursing notes reviewed.          LAB RESULTS  Recent Results (from the past 24 hour(s))   Comprehensive Metabolic Panel    Collection Time: 01/02/23 11:21 PM    Specimen: Arm, Left; Blood   Result Value Ref Range    Glucose 115 (H) 65 - 99 mg/dL    BUN 18 8 - 23 mg/dL    Creatinine 1.47 (H) 0.76 - 1.27 mg/dL    Sodium 137 136 - 145 mmol/L    Potassium 4.2 3.5 - 5.2 mmol/L    Chloride 104 98 - 107 mmol/L    CO2 23.5 22.0 - 29.0 mmol/L    Calcium 8.8 8.6 - 10.5 mg/dL    Total Protein 7.0 6.0 - 8.5 g/dL    Albumin 3.8 3.5 - 5.2 g/dL    ALT (SGPT) 18 1 - 41 U/L    AST (SGOT) 25 1 - 40 U/L    Alkaline Phosphatase 114 39 - 117 U/L    Total Bilirubin 0.6 0.0 - 1.2 mg/dL    Globulin 3.2 gm/dL    A/G Ratio 1.2 g/dL    BUN/Creatinine Ratio 12.2 7.0 - 25.0    Anion Gap 9.5 5.0 - 15.0 mmol/L    eGFR 49.7 (L) >60.0 mL/min/1.73   BNP     Collection Time: 01/02/23 11:21 PM    Specimen: Arm, Left; Blood   Result Value Ref Range    proBNP 825.0 0.0 - 900.0 pg/mL   Troponin    Collection Time: 01/02/23 11:21 PM    Specimen: Arm, Left; Blood   Result Value Ref Range    Troponin T 0.097 (C) 0.000 - 0.030 ng/mL   CBC Auto Differential    Collection Time: 01/02/23 11:21 PM    Specimen: Arm, Left; Blood   Result Value Ref Range    WBC 2.95 (L) 3.40 - 10.80 10*3/mm3    RBC 3.75 (L) 4.14 - 5.80 10*6/mm3    Hemoglobin 11.4 (L) 13.0 - 17.7 g/dL    Hematocrit 33.3 (L) 37.5 - 51.0 %    MCV 88.8 79.0 - 97.0 fL    MCH 30.4 26.6 - 33.0 pg    MCHC 34.2 31.5 - 35.7 g/dL    RDW 13.1 12.3 - 15.4 %    RDW-SD 43.0 37.0 - 54.0 fl    MPV 9.1 6.0 - 12.0 fL    Platelets 118 (L) 140 - 450 10*3/mm3    Neutrophil % 64.2 42.7 - 76.0 %    Lymphocyte % 22.0 19.6 - 45.3 %    Monocyte % 11.5 5.0 - 12.0 %    Eosinophil % 1.7 0.3 - 6.2 %    Basophil % 0.3 0.0 - 1.5 %    Immature Grans % 0.3 0.0 - 0.5 %    Neutrophils, Absolute 1.89 1.70 - 7.00 10*3/mm3    Lymphocytes, Absolute 0.65 (L) 0.70 - 3.10 10*3/mm3    Monocytes, Absolute 0.34 0.10 - 0.90 10*3/mm3    Eosinophils, Absolute 0.05 0.00 - 0.40 10*3/mm3    Basophils, Absolute 0.01 0.00 - 0.20 10*3/mm3    Immature Grans, Absolute 0.01 0.00 - 0.05 10*3/mm3    nRBC 0.0 0.0 - 0.2 /100 WBC   ECG 12 Lead Dyspnea    Collection Time: 01/02/23 11:34 PM   Result Value Ref Range    QT Interval 394 ms   Troponin    Collection Time: 01/03/23  1:28 AM    Specimen: Arm, Left; Blood   Result Value Ref Range    Troponin T 0.111 (C) 0.000 - 0.030 ng/mL   Lactic Acid, Plasma    Collection Time: 01/03/23  4:18 AM    Specimen: Blood   Result Value Ref Range    Lactate 0.7 0.5 - 2.0 mmol/L   Troponin    Collection Time: 01/03/23  4:18 AM    Specimen: Blood   Result Value Ref Range    Troponin T 0.095 (C) 0.000 - 0.030 ng/mL       Ordered the above labs and reviewed the results.        RADIOLOGY  XR Chest 1 View    Result Date: 1/2/2023  PORTABLE CHEST X-RAY   HISTORY: Shortness of breath. Prior esophagectomy and gastric pull-up.  TECHNIQUE: Portable chest x-ray correlated with CT angiogram chest May 12, 2022.  FINDINGS: Postoperative change from gastric pull-up as was previously demonstrated. The right thoracic cage is noted as is pleural thickening laterally on the right. Left heart and mediastinal contours appear normal. Left lung is clear. No new or acute pulmonary abnormality identified.      Postoperative change in the chest. No acute abnormality identified.  This report was finalized on 1/2/2023 11:59 PM by Dr. Darian Howell M.D.      CT Angiogram Chest    Result Date: 1/3/2023  Patient: JJ BOURGEOIS  Time Out: 03:30 Exam(s): CTA CHEST EXAM:   CT Angiography Chest With Intravenous Contrast CLINICAL HISTORY:    Reason for exam: soa r o PE. TECHNIQUE:   Axial computed tomographic angiography images of the chest with intravenous contrast.  CTDI is 3.99 mGy and DLP is 180.6 mGy-cm.  This CT exam was performed according to the principle of ALARA (As Low As Reasonably Achievable) by using one or more of the following dose reduction techniques: automated exposure control, adjustment of the mA and or kV according to patient size, and or use of iterative reconstruction technique.   MIP reconstructed images were created and reviewed. COMPARISON:   CT angiogram chest 5 12 2022. FINDINGS:   Pulmonary arteries:  Unremarkable.  No pulmonary embolism.   Aorta:  No acute findings.  No thoracic aortic aneurysm.   Lungs:  There is patchy airspace opacities in bilateral lower lobes.  There is architectural distortion in the right lower lobe compatible with rounded atelectasis.   Pleural space:  Thick-walled right pleural effusion measuring 1.5 cm in depth is present.  It is similar to prior.  No pneumothorax.   Heart:  Unremarkable.  No cardiomegaly.  No significant pericardial effusion.  No evidence of RV dysfunction.   Mediastinum:  Postop changes suggestive of an esophagectomy  with gastric pull-up.   Bones joints:  Bones show postoperative changes in the right hemithorax with thoracotomy involving the right fifth rib.  No acute fracture.  No dislocation.   Soft tissues:  Unremarkable.   Lymph nodes:  There is mild right adenopathy that is new or more apparent from the previous exam. IMPRESSION:       No evidence of pulmonary emboli with chronic postoperative changes in the mediastinum and right hemithorax showing slightly worse bibasilar atelectasis in the interval.  Query aspiration pneumonitis.     Electronically signed by Harshad Gonzáles MD on 01-03-23 at 0330      Ordered the above noted radiological studies. Reviewed by me in PACS.          PROCEDURES  Procedures        MEDICATIONS GIVEN IN ER  Medications   sodium chloride 0.9 % flush 10 mL (has no administration in time range)   sodium chloride 0.9 % flush 10 mL (has no administration in time range)   sodium chloride 0.9 % flush 10 mL (has no administration in time range)   sodium chloride 0.9 % infusion 40 mL (has no administration in time range)   nitroglycerin (NITROSTAT) SL tablet 0.4 mg (has no administration in time range)   sodium chloride 0.9 % infusion (100 mL/hr Intravenous New Bag 1/3/23 0438)   acetaminophen (TYLENOL) tablet 650 mg (has no administration in time range)     Or   acetaminophen (TYLENOL) 160 MG/5ML solution 650 mg (has no administration in time range)     Or   acetaminophen (TYLENOL) suppository 650 mg (has no administration in time range)   ondansetron (ZOFRAN) injection 4 mg (has no administration in time range)   iopamidol (ISOVUE-370) 76 % injection 100 mL (95 mL Intravenous Given 1/3/23 0034)   cefTRIAXone (ROCEPHIN) 1 g in sodium chloride 0.9 % 100 mL IVPB-VTB (0 g Intravenous Stopped 1/3/23 0431)   azithromycin (ZITHROMAX) 500 mg in sodium chloride 0.9 % 250 mL IVPB-VTB (500 mg Intravenous Given 1/3/23 0431)   aspirin chewable tablet 324 mg (324 mg Oral Given 1/3/23 0431)   Enoxaparin Sodium  (LOVENOX) syringe 100 mg (100 mg Subcutaneous Given 1/3/23 0431)           MEDICAL DECISION MAKING, PROGRESS, and CONSULTS    All labs have been independently reviewed by me.  All radiology studies have been reviewed by me and discussed with radiologist dictating the report.   EKG's independently viewed and interpreted by me.  Discussion below represents my analysis of pertinent findings related to patient's condition, differential diagnosis, treatment plan and final disposition.      Orders placed during this visit:  Orders Placed This Encounter   Procedures   • Blood Culture - Blood,   • Blood Culture - Blood,   • XR Chest 1 View   • CT Angiogram Chest   • Comprehensive Metabolic Panel   • BNP   • Troponin   • CBC Auto Differential   • Troponin   • Lactic Acid, Plasma   • Potassium   • Magnesium   • Troponin   • Blood Gas, Arterial -   • Troponin   • Basic Metabolic Panel   • CBC Auto Differential   • Protime-INR   • Diet: Cardiac Diets; Healthy Heart (2-3 Na+); Texture: Regular Texture (IDDSI 7); Fluid Consistency: Thin (IDDSI 0)   • Monitor Blood Pressure   • Pulse Oximetry, Continuous   • Vital Signs   • Intake & Output   • Weigh Patient   • Oral Care   • Telemetry - Maintain IV Access   • Continuous Cardiac Monitoring   • Telemetry - Pulse Oximetry   • May Be Off Telemetry for Tests   • Notify Provider if ACLS Protocol Activated   • Up With Assistance   • Code Status and Medical Interventions:   • LHA (on-call MD unless specified) Details   • LCG (on-call MD unless specified)   • Inpatient Cardiology Consult   • Oxygen Therapy- Nasal Cannula; Titrate for SPO2: 90% - 95%   • Oxygen Therapy- Nasal Cannula; Titrate for SPO2: 90% - 95%   • ECG 12 Lead Dyspnea   • ECG 12 Lead Other; Acute Chest Pain or Dysrhythmia   • Insert Peripheral IV   • Insert Peripheral IV   • Inpatient Admission   • Fall Precautions   • CBC & Differential         Additional sources:  - Discussed/obtained information from independent  historians: Patient's wife provide additional history and confirmed that the patient's leg swelling is at his baseline.  She also confirmed that the patient is not at his baseline in terms of his exercise tolerance.  Wife states that the patient is a poor historian.  Additional information was obtained to confirm the patient's history.    - External (non-ED) record review: See summary in ED course below          Differential diagnosis:    Differential diagnosis includes but not limited to CHF, acute coronary syndrome, pulmonary embolism, pneumothorax, pneumonia, asthma/COPD, pulmonary hypertension, deconditioning, anemia, other hematologic etiologies such as CO poisoning, anxiety.     Given the patient's history of VTE despite anticoagulation, I think it is important to rule out PE.  Therefore, CT angiogram has been ordered.          Independent interpretation of labs, radiology studies, and discussions with consultants:  ED Course as of 01/03/23 0604   Mon Jan 02, 2023 2332 WBC(!): 2.95 [TD]   2332 Hemoglobin(!): 11.4 [TD]   2343 On medical chart review, patient saw Dr. Phelan, oncology on 11/22/2022.  Patient is followed for esophageal adenocarcinoma.  There is no evidence of recurrence.  Patient remains in remission.  Patient does have a history of recurrent DVT prior to cancer diagnosis.  Chronic left leg larger than right since DVT.  Patient is currently on Eliquis.  He has a history of iron deficiency anemia with prior hemorrhoidal bleeding. [TD]   2343 Hemoglobin(!): 11.4 [TD]   2343 WBC(!): 2.95 [TD]   2343 Patient has baseline anemia. [TD]   Tue Jan 03, 2023   0004 Troponin T(!!): 0.097 [TD]   0004 Creatinine(!): 1.47 [TD]   0004 eGFR(!): 49.7 [TD]   0005 Troponin is roughly at its historical baseline [TD]   0032 EKG interpreted by myself.  Time 11:34 PM.  Sinus rhythm.  Heart rate 79.  Normal axis and intervals.  Prolonged R wave progression.  No acute ST abnormality although this is limited due to  artifact from bladder stimulator. [TD]   0146 CT angiogram of the chest interpreted myself.  No central pulmonary embolism noted.  However, he does have right lower lung consolidation that is more prominent in the left. [TD]   0402 Although CT scan could be suggestive of pneumonia, I am concerned that the patient's exertional dyspnea which could be cardiac equivalent.  Patient has rising troponin.  Therefore, I am giving him aspirin as well as Lovenox.  Consult cardiology. [TD]   0405 I discussed the case with FAYE Pugh for hospitalist medicine.  We reviewed patient's labs, history, imaging.  She will admit. [TD]      ED Course User Index  [TD] Jose Aiken II, MD     Patient has remained chest pain-free throughout his ED course.       PPE: The patient wore a mask throughout the entire encounter. I wore a well-fitting mask.    DIAGNOSIS  Final diagnoses:   Exertional dyspnea   Community acquired pneumonia, unspecified laterality   NSTEMI (non-ST elevated myocardial infarction) ([facility])         DISPOSITION  Admit      Latest Documented Vital Signs:  As of 06:04 EST  BP- 145/92 HR- 111 Temp- 98.7 °F (37.1 °C) (Oral) O2 sat- 91%      --    Please note that portions of this were completed with a voice recognition program.       Note Disclaimer: At [facility], we believe that sharing information builds trust and better relationships. You are receiving this note because you are receiving care at [facility] or recently visited. It is possible you will see health information before a provider has talked with you about it. This kind of information can be easy to misunderstand. To help you fully understand what it means for your health, we urge you to discuss this note with your provider.       Jose Aiken II, MD  01/03/23 0628

## 2023-01-03 NOTE — PROGRESS NOTES
[facility]: Enoxaparin Consult    Jose Hurtado has a pharmacy consult to dose full-dose enoxaparin per Varun Krause APRN's request.     Indication: history of DVT/PE  Home Anticoagulation: Eliquis 5 mg BID     Relevant clinical data and objective history reviewed:  72 y.o. male 195.6 cm (77\") 95.4 kg (210 lb 6.4 oz)   Body mass index is 24.95 kg/m².   Results from last 7 days   Lab Units 01/03/23  0827   PLATELETS 10*3/mm3 125*     Estimated Creatinine Clearance: 59.5 mL/min (A) (by C-G formula based on SCr of 1.47 mg/dL (H)).    Assessment/Plan    Will start patient on  100 (1mg/kg) subcutaneous every 12 hours, adjusted for renal function. Will continue to monitor closely as mild thrombocytopenia noted.       Marcelo Tatum [facility]  Clinical Pharmacist

## 2023-01-03 NOTE — ED NOTES
Nursing report ED to floor  Jose Hurtado  72 y.o.  male    HPI :   Chief Complaint   Patient presents with    Shortness of Breath    Leg Swelling       Admitting doctor:   Deepak Keyes MD    Admitting diagnosis:   The primary encounter diagnosis was Exertional dyspnea. Diagnoses of Community acquired pneumonia, unspecified laterality and NSTEMI (non-ST elevated myocardial infarction) (HCC) were also pertinent to this visit.    Code status:   Current Code Status       Date Active Code Status Order ID Comments User Context       1/3/2023 0409 CPR (Attempt to Resuscitate) [id number]  Yahaira Leavitt, APRN ED        Question Answer    Code Status (Patient has no pulse and is not breathing) CPR (Attempt to Resuscitate)    Medical Interventions (Patient has pulse or is breathing) Full Support                    Allergies:   Patient has no known allergies.    Isolation:   No active isolations    Intake and Output  No intake or output data in the 24 hours ending 01/03/23 0455    Weight:       01/02/23  2331   Weight: 97.5 kg (215 lb)       Most recent vitals:   Vitals:    01/03/23 0301 01/03/23 0331 01/03/23 0401 01/03/23 0410   BP: 134/65 132/70 162/83    Pulse: 71 71 71 114   Resp:       Temp:       TempSrc:       SpO2: 91% 91% 92% 92%   Weight:       Height:           Active LDAs/IV Access:   Lines, Drains & Airways       Active LDAs       Name Placement date Placement time Site Days    Peripheral IV 01/02/23 Left Antecubital 01/02/23  --  Antecubital  1                    Labs (abnormal labs have a star):   Labs Reviewed   COMPREHENSIVE METABOLIC PANEL - Abnormal; Notable for the following components:       Result Value    Glucose 115 (*)     Creatinine 1.47 (*)     eGFR 49.7 (*)     All other components within normal limits    Narrative:     GFR Normal >60  Chronic Kidney Disease <60  Kidney Failure <15    The GFR formula is only valid for adults with stable renal function between ages 18 and 70.   TROPONIN  (IN-HOUSE) - Abnormal; Notable for the following components:    Troponin T 0.097 (*)     All other components within normal limits    Narrative:     Troponin T Reference Range:  <= 0.03 ng/mL-   Negative for AMI  >0.03 ng/mL-     Abnormal for myocardial necrosis.  Clinicians would have to utilize clinical acumen, EKG, Troponin and serial changes to determine if it is an Acute Myocardial Infarction or myocardial injury due to an underlying chronic condition.       Results may be falsely decreased if patient taking Biotin.     CBC WITH AUTO DIFFERENTIAL - Abnormal; Notable for the following components:    WBC 2.95 (*)     RBC 3.75 (*)     Hemoglobin 11.4 (*)     Hematocrit 33.3 (*)     Platelets 118 (*)     Lymphocytes, Absolute 0.65 (*)     All other components within normal limits   TROPONIN (IN-HOUSE) - Abnormal; Notable for the following components:    Troponin T 0.111 (*)     All other components within normal limits    Narrative:     Troponin T Reference Range:  <= 0.03 ng/mL-   Negative for AMI  >0.03 ng/mL-     Abnormal for myocardial necrosis.  Clinicians would have to utilize clinical acumen, EKG, Troponin and serial changes to determine if it is an Acute Myocardial Infarction or myocardial injury due to an underlying chronic condition.       Results may be falsely decreased if patient taking Biotin.     TROPONIN (IN-HOUSE) - Abnormal; Notable for the following components:    Troponin T 0.095 (*)     All other components within normal limits    Narrative:     Troponin T Reference Range:  <= 0.03 ng/mL-   Negative for AMI  >0.03 ng/mL-     Abnormal for myocardial necrosis.  Clinicians would have to utilize clinical acumen, EKG, Troponin and serial changes to determine if it is an Acute Myocardial Infarction or myocardial injury due to an underlying chronic condition.       Results may be falsely decreased if patient taking Biotin.     BNP (IN-HOUSE) - Normal    Narrative:     Among patients with dyspnea,  NT-proBNP is highly sensitive for the detection of acute congestive heart failure. In addition NT-proBNP of <300 pg/ml effectively rules out acute congestive heart failure with 99% negative predictive value.    Results may be falsely decreased if patient taking Biotin.     LACTIC ACID, PLASMA - Normal   BLOOD CULTURE   BLOOD CULTURE   TROPONIN (IN-HOUSE)   BASIC METABOLIC PANEL   CBC WITH AUTO DIFFERENTIAL   PROTIME-INR   CBC AND DIFFERENTIAL    Narrative:     The following orders were created for panel order CBC & Differential.  Procedure                               Abnormality         Status                     ---------                               -----------         ------                     CBC Auto Differential[[id number]]        Abnormal            Final result                 Please view results for these tests on the individual orders.       EKG:   ECG 12 Lead Dyspnea    (Results Pending)       Meds given in ED:   Medications   sodium chloride 0.9 % flush 10 mL (has no administration in time range)   azithromycin (ZITHROMAX) 500 mg in sodium chloride 0.9 % 250 mL IVPB-VTB (500 mg Intravenous Given 1/3/23 0431)   sodium chloride 0.9 % flush 10 mL (has no administration in time range)   sodium chloride 0.9 % flush 10 mL (has no administration in time range)   sodium chloride 0.9 % infusion 40 mL (has no administration in time range)   nitroglycerin (NITROSTAT) SL tablet 0.4 mg (has no administration in time range)   sodium chloride 0.9 % infusion (100 mL/hr Intravenous New Bag 1/3/23 0438)   acetaminophen (TYLENOL) tablet 650 mg (has no administration in time range)     Or   acetaminophen (TYLENOL) 160 MG/5ML solution 650 mg (has no administration in time range)     Or   acetaminophen (TYLENOL) suppository 650 mg (has no administration in time range)   ondansetron (ZOFRAN) injection 4 mg (has no administration in time range)   iopamidol (ISOVUE-370) 76 % injection 100 mL (95 mL Intravenous Given 1/3/23  0034)   cefTRIAXone (ROCEPHIN) 1 g in sodium chloride 0.9 % 100 mL IVPB-VTB (0 g Intravenous Stopped 1/3/23 0431)   aspirin chewable tablet 324 mg (324 mg Oral Given 1/3/23 0431)   Enoxaparin Sodium (LOVENOX) syringe 100 mg (100 mg Subcutaneous Given 1/3/23 0431)       Imaging results:  XR Chest 1 View    Result Date: 2023  Postoperative change in the chest. No acute abnormality identified.  This report was finalized on 2023 11:59 PM by Dr. Darian Howell M.D.      CT Angiogram Chest    Result Date: 1/3/2023  Electronically signed by Harshad Gonzáles MD on 23 at 0330     Ambulatory status:   - up with assist      Social issues:   Social History     Socioeconomic History    Marital status:      Spouse name: Lena    Number of children: 0    Years of education: College   Tobacco Use    Smoking status: Former     Packs/day: 2.00     Years: 3.00     Pack years: 6.00     Types: Cigarettes     Quit date:      Years since quittin.0    Smokeless tobacco: Former     Types: Chew    Tobacco comments:     Caffeine - coffee and soda    Vaping Use    Vaping Use: Never used   Substance and Sexual Activity    Alcohol use: Yes     Alcohol/week: 3.0 standard drinks     Types: 1 Glasses of wine, 1 Cans of beer, 1 Shots of liquor per week     Comment: 4 drinks/week    Drug use: Not Currently     Types: Marijuana     Comment: hx marijuana use \"a long time ago\"    Sexual activity: Defer       NIH Stroke Scale:         Katarina Davies RN  23 04:55 EST

## 2023-01-03 NOTE — CASE MANAGEMENT/SOCIAL WORK
Discharge Planning Assessment  [facility]     Patient Name: Jose Hurtado  MRN: [id number]  Today's Date: 1/3/2023    Admit Date: 1/2/2023    Plan: Home; Denies needs.   Discharge Needs Assessment     Row Name 01/03/23 1712       Living Environment    People in Home spouse    Name(s) of People in Home gamaliel Willis    Current Living Arrangements home    Primary Care Provided by self    Provides Primary Care For pet(s)  2 DOGS    Family Caregiver if Needed spouse    Family Caregiver Names Lena    Quality of Family Relationships helpful;involved;supportive    Able to Return to Prior Arrangements yes       Resource/Environmental Concerns    Resource/Environmental Concerns none    Transportation Concerns none       Transition Planning    Patient/Family Anticipates Transition to home with family    Patient/Family Anticipated Services at Transition none    Transportation Anticipated family or friend will provide       Discharge Needs Assessment    Equipment Currently Used at Home cane, straight;grab bar    Concerns to be Addressed no discharge needs identified;denies needs/concerns at this time    Anticipated Changes Related to Illness none    Equipment Needed After Discharge none    Provided Post Acute Provider List? N/A    N/A Provider List Comment No need for list identified at this time.               Discharge Plan     Row Name 01/03/23 1713       Plan    Plan Home; Denies needs.    Plan Comments CCP spoke with patient at bedside to complete his discharge screening assessment.  Pt given IMM.  CCP introduced self and role.  Pt confirmed information on his face sheet.  Pt reports he is IADL’s, retired and drives.  Pt lives with wife, Lena Alvarado ([phone]) in single story home with one entrance stair step.  Pt has the following DME- straight cane and grab bars.  Pt reports PCP is, Brandin Bolton.  Pt confirmed pharmacy is, [facility].  Pt reports he has used [facility]  in past.   Pt reports he has been to [facility] for past sub-acute rehab.  Pt plans to return home at discharge.  Pt denies current discharge needs.  CCP will continue to follow - Elvia BAKER /WAQAS MEYERS.              Continued Care and Services - Admitted Since 1/2/2023    Coordination has not been started for this encounter.       Expected Discharge Date and Time     Expected Discharge Date Expected Discharge Time    Jan 6, 2023          Demographic Summary     Row Name 01/03/23 1711       General Information    Admission Type inpatient    Arrived From emergency department    Required Notices Provided Important Message from Medicare    Referral Source admission list    Reason for Consult discharge planning    Preferred Language English       Contact Information    Permission Granted to Share Info With family/designee               Functional Status     Row Name 01/03/23 1711       Functional Status    Usual Activity Tolerance moderate    Current Activity Tolerance moderate       Functional Status, IADL    Medications independent    Meal Preparation independent    Housekeeping independent    Laundry independent    Shopping independent       Mental Status    General Appearance WDL WDL       Mental Status Summary    Recent Changes in Mental Status/Cognitive Functioning no changes       Employment/    Employment Status retired               Psychosocial    No documentation.                Abuse/Neglect    No documentation.                Legal    No documentation.                Substance Abuse    No documentation.                Patient Forms    No documentation.                   Elvia Benítez RN

## 2023-01-03 NOTE — TELEPHONE ENCOUNTER
Caller: JJ    Relationship: SELF    Best call back number: [phone]    What is the best time to reach you: ANY      What was the call regarding: PT CALLED TO LET MICHELLE KNOW HE HAD TO CANCEL HIS APPOINTMENT DUE TO BEING ADMITTED TO THE HOSPITAL.    Do you require a callback: IF NEEDED

## 2023-01-03 NOTE — H&P
Patient Name:  Jose Hurtado  YOB: 1950  MRN:  [id number]  Admit Date:  1/2/2023  Patient Care Team:  Brandin Bolton MD as PCP - General (Internal Medicine)  Tapan, George THORPE II, MD as Consulting Physician (Hematology and Oncology)  Jose Inman MD as Consulting Physician (Urology)  Mulugeta Millan MD as Consulting Physician (Gastroenterology)  Jose Christine III, MD as Referring Physician (Thoracic Surgery)  Seth Randhawa MD as Consulting Physician (Ophthalmology)  James Cyr MD as Consulting Physician (Cardiology)      Subjective   History Present Illness     Chief Complaint   Patient presents with   • Shortness of Breath   • Leg Swelling       Mr. Hurtado is a 72 y.o. former smoker with a history of antiphospholipid syndrome on chronic anticoagulation, atrial fibrillation, hypertension, hearing loss, lymphedema BLE, DVT, esophageal carcinoma that presents to [facility] complaining of shortness of breath and leg swelling.    Lives alone.  States a couple days ago feeling unwell with cough, chills, & shortness of breath; therefore, went to [facility] ER for evaluation.    Denies use of supplemental oxygen at home, chest pain or history of CAD.    Serial troponin positive--treatment dose enoxaparin and aspirin provided in ER.    Recommendation pending hospital course.  Details below.    Shortness of Breath  Pertinent negatives include no chest pain, fever, leg swelling, rash, rhinorrhea or vomiting.   Leg Swelling  Associated symptoms include chills, coughing and myalgias. Pertinent negatives include no chest pain, congestion, fever, nausea, rash or vomiting.     Review of Systems   Constitutional: Positive for activity change and chills. Negative for appetite change and fever.   HENT: Negative for congestion and rhinorrhea.    Respiratory: Positive for cough and shortness of breath.    Cardiovascular: Negative for chest pain and leg swelling.    Gastrointestinal: Negative for constipation, diarrhea, nausea and vomiting.   Endocrine: Negative for polydipsia, polyphagia and polyuria.   Genitourinary: Positive for frequency. Negative for difficulty urinating and dysuria.   Musculoskeletal: Positive for gait problem and myalgias.   Skin: Negative for rash and wound.   Neurological: Negative for syncope and light-headedness.   Psychiatric/Behavioral: Negative for confusion and hallucinations.        Personal History     Past Medical History:   Diagnosis Date   • Acute deep vein thrombosis (DVT) of popliteal vein of left lower extremity (HCC) 03/24/2020   • Anemia    • Anti-phospholipid syndrome (HCC)     On lifelong anticoagulation therapy   • Bladder disorder     LEAKAGE   ON MED  wers pads   • Bleeding hemorrhoids    • Community acquired pneumonia     HISTORY OF IN 2014   • Deep venous thrombosis (HCC) 2006, 2008    Left lower extremity multiple   • Depression    • Esophageal carcinoma (HCC) 12/31/2014    had chemo and radiation prior surgery   • Hemorrhoids    • HH (hiatus hernia)    • History of atrial fibrillation 2015    ONE EPISODE WHILE HOSPITALIZED   • History of kidney stones    • History of nephrolithiasis    • History of pancreatitis     PT STATES MANY YEARS AGO   • History of radiation therapy    • History of transfusion    • Hypertension    • Long-term (current) use of anticoagulants, INR goal 2.0-3.0    • Lymphedema    • Malignant neoplasm of prostate (HCC)    • Other hyperlipidemia 01/30/2018 January 30, 2018 lipid panel risk 12.8%   • Restless legs syndrome    • Sleep apnea     OCCASIONALLY WEARS CPAP   • Squamous carcinoma     on the head     Past Surgical History:   Procedure Laterality Date   • APPENDECTOMY  1950   • BRONCHOSCOPY      (Diagnostic)   • CARDIAC CATHETERIZATION N/A 5/14/2022    Procedure: Left Heart Cath;  Surgeon: Eric So MD;  Location: [facility] INVASIVE LOCATION;  Service: Cardiology;  Laterality: N/A;    • CARDIAC CATHETERIZATION N/A 5/14/2022    Procedure: Coronary angiography;  Surgeon: Eric So MD;  Location: [facility] CATH INVASIVE LOCATION;  Service: Cardiology;  Laterality: N/A;   • CARDIAC CATHETERIZATION N/A 5/14/2022    Procedure: Left ventriculography;  Surgeon: Eric So MD;  Location: [facility] CATH INVASIVE LOCATION;  Service: Cardiology;  Laterality: N/A;   • CATARACT EXTRACTION Bilateral 2014   • COLONOSCOPY  12/15/2014    Complete / Description: EH, IH, torts, stool, follow-up colonoscopy due in 5 years.   • COLONOSCOPY N/A 6/13/2017    non-thrombosed external hemorrhoids, normal examined ileum, IH   • COLONOSCOPY N/A 2/26/2019    Procedure: COLONOSCOPY with Cold Polypectomy;  Surgeon: Mulugeta Millan MD;  Location: [facility] ENDOSCOPY;  Service: Gastroenterology   • COLONOSCOPY N/A 12/16/2020    Procedure: COLONOSCOPY to cecum into TI;  Surgeon: Mesha Sanchez MD;  [facility];  Service: Gastroenterology;  Laterality: N/A;  pre: lower GI bleed  post: hemorrhoids    • CYSTOSCOPY W/ LASER LITHOTRIPSY     • ENDOSCOPY N/A 6/13/2017    Procedure: ESOPHAGOGASTRODUODENOSCOPY WITH COLD BIOPSY;  Surgeon: Lake Gonzalez MD;  Location: [facility] ENDOSCOPY;  Service:    • ENDOSCOPY N/A 11/18/2021    Procedure: ESOPHAGOGASTRODUODENOSCOPY WITH  DILATATION WITH FLUOROSCOPY;  Surgeon: Jose Christine III, MD;  Location: [facility] ENDOSCOPY;  Service: Gastroenterology;  Laterality: N/A;  Pre: dysphagia, h/x of adinocarcinoma of esophagus  Post: same   • ESOPHAGECTOMY      April 2015, stage IIB esophageal carcinoma, sub-total resection.   • ESOPHAGECTOMY      Esophagectomy Subtotal Andrea Joe Procedure   • EXCISION LESION  08/2012    Removal of Squamous Cell CA on Head   • HAMMER TOE REPAIR  09/2014    Hammertoe Operation (Each Toe), 10/2014   • HAMMER TOE REPAIR Left 10/3/2017    Procedure: Left second third and fourth distal interphalangeal joint resection with flexor  tenotomy;  Surgeon: Mulugeta Lira MD;  Location: [facility];  Service:    • HEMORRHOIDECTOMY N/A 2020    Procedure: HEMORRHOIDECTOMY;  Surgeon: Billy Julio Jr., MD;  Location: [facility] OR;  Service: General;  Laterality: N/A;   • HERNIA REPAIR      incisional   • JEJUNOSTOMY      Laparoscopic   • JEJUNOSTOMY      tube removal    • KNEE SURGERY Bilateral , ,    • PATELLA SURGERY Left     removed   • PILONIDAL CYST / SINUS EXCISION     • MS ARTHRP KNE CONDYLE&PLATU MEDIAL&LAT COMPARTMENTS Right 3/26/2018    Procedure: TOTAL KNEE ARTHROPLASTY;  Surgeon: Renny Solis MD;  Location: [facility] OR;  Service: Orthopedics   • PROSTATECTOMY     • PYLOROPLASTY     • SPINAL FUSION  1998    C 5,6   • TONSILLECTOMY     • UPPER GASTROINTESTINAL ENDOSCOPY  12/15/2014    LA Grade D esophagitis, Pardo's, HH, multiple duodenal ulcers   • VENTRAL/INCISIONAL HERNIA REPAIR N/A 2016    Procedure: VENTRAL/INCISIONAL HERNIA REPAIR, open, with mesh, and component separation;  Surgeon: Darren Rivas MD;  Location: [facility];  Service:      Family History   Problem Relation Age of Onset   • Cerebral aneurysm Mother         cerebral artery aneurysm ( age 56)   • Prostate cancer Brother 68   • Anxiety disorder Father    • Suicide Attempts Father          of suicide   • Cancer Father         bladder   • No Known Problems Brother    • Pancreatic cancer Nephew    • Colon cancer Neg Hx    • Esophageal cancer Neg Hx    • Dementia Neg Hx    • Malig Hyperthermia Neg Hx      Social History     Tobacco Use   • Smoking status: Former     Packs/day: 2.00     Years: 3.00     Pack years: 6.00     Types: Cigarettes     Quit date:      Years since quittin.0   • Smokeless tobacco: Former     Types: Chew   • Tobacco comments:     Caffeine - coffee and soda    Vaping Use   • Vaping Use: Never used   Substance Use Topics   • Alcohol use: Yes     Alcohol/week: 3.0 standard  drinks     Types: 1 Glasses of wine, 1 Cans of beer, 1 Shots of liquor per week     Comment: 4 drinks/week   • Drug use: Not Currently     Types: Marijuana     Comment: hx marijuana use \"a long time ago\"     No current facility-administered medications on file prior to encounter.     Current Outpatient Medications on File Prior to Encounter   Medication Sig Dispense Refill   • atorvastatin (LIPITOR) 40 MG tablet Take 1 tablet by mouth Every Night. 90 tablet 3   • cyanocobalamin 1000 MCG/ML injection      • Eliquis 5 MG tablet tablet TAKE ONE TABLET BY MOUTH EVERY 12 HOURS 60 tablet 1   • furosemide (Lasix) 80 MG tablet Take 0.5 tablets by mouth Daily for 30 days. 15 tablet 0   • Gemtesa 75 MG tablet Daily.     • metoprolol tartrate (LOPRESSOR) 25 MG tablet Take 1 tablet by mouth 2 (Two) Times a Day. 180 tablet 3   • pantoprazole (PROTONIX) 40 MG EC tablet TAKE ONE TABLET BY MOUTH TWICE A DAY BEFORE A MEAL 180 tablet 3   • PARoxetine (PAXIL) 40 MG tablet TAKE ONE TABLET BY MOUTH EVERY MORNING 30 tablet 5   • potassium chloride (MICRO-K) 10 MEQ CR capsule Take 1 capsule by mouth Daily. 90 capsule 5   • pramipexole (MIRAPEX) 1.5 MG tablet TAKE ONE TABLET BY MOUTH TWICE A DAY (Patient taking differently: Take 3 mg by mouth Every Night.) 180 tablet 1   • tiotropium bromide monohydrate (Spiriva Respimat) 2.5 MCG/ACT aerosol solution inhaler Inhale 2 puffs Daily. 4 g 2   • albuterol sulfate  (90 Base) MCG/ACT inhaler Inhale 1 puff Every 4 (Four) Hours As Needed for Wheezing.       No Known Allergies    Objective    Objective     Vital Signs  Temp:  [98.4 °F (36.9 °C)-98.8 °F (37.1 °C)] 98.4 °F (36.9 °C)  Heart Rate:  [] 122  Resp:  [16-18] 16  BP: (124-162)/(62-97) 153/92  SpO2:  [91 %-94 %] 93 %  on  Flow (L/min):  [2] 2;   Device (Oxygen Therapy): room air  Body mass index is 24.95 kg/m².    Physical Exam  Constitutional:       General: He is not in acute distress.     Appearance: He is ill-appearing. He is  not toxic-appearing.   HENT:      Head: Normocephalic and atraumatic.      Comments: Hearing loss  Eyes:      Extraocular Movements: Extraocular movements intact.      Conjunctiva/sclera: Conjunctivae normal.   Cardiovascular:      Rate and Rhythm: Tachycardia present.      Heart sounds: Normal heart sounds.   Pulmonary:      Effort: Pulmonary effort is normal.      Breath sounds: Wheezing present.   Abdominal:      General: Bowel sounds are normal.      Palpations: Abdomen is soft.   Musculoskeletal:         General: No tenderness.      Cervical back: Normal range of motion and neck supple.      Right lower leg: Edema present.      Left lower leg: Edema present.   Skin:     General: Skin is warm.      Findings: Erythema (L>R) present.   Neurological:      Mental Status: He is alert and oriented to person, place, and time.      Cranial Nerves: No cranial nerve deficit.   Psychiatric:         Behavior: Behavior normal.         Thought Content: Thought content normal.         Results Review:  I reviewed the patient's new clinical results.  I reviewed the patient's new imaging results and agree with the interpretation.  I reviewed the patient's other test results and agree with the interpretation  I personally viewed and interpreted the patient's EKG/Telemetry data  Discussed with ED provider.    Lab Results (last 24 hours)     Procedure Component Value Units Date/Time    CBC & Differential [[id number]]  (Abnormal) Collected: 01/02/23 2321    Specimen: Blood from Arm, Left Updated: 01/02/23 2331    Narrative:      The following orders were created for panel order CBC & Differential.  Procedure                               Abnormality         Status                     ---------                               -----------         ------                     CBC Auto Differential[[id number]]        Abnormal            Final result                 Please view results for these tests on the individual orders.    Comprehensive  Metabolic Panel [[id number]]  (Abnormal) Collected: 01/02/23 2321    Specimen: Blood from Arm, Left Updated: 01/02/23 2352     Glucose 115 mg/dL      BUN 18 mg/dL      Creatinine 1.47 mg/dL      Sodium 137 mmol/L      Potassium 4.2 mmol/L      Chloride 104 mmol/L      CO2 23.5 mmol/L      Calcium 8.8 mg/dL      Total Protein 7.0 g/dL      Albumin 3.8 g/dL      ALT (SGPT) 18 U/L      AST (SGOT) 25 U/L      Alkaline Phosphatase 114 U/L      Total Bilirubin 0.6 mg/dL      Globulin 3.2 gm/dL      A/G Ratio 1.2 g/dL      BUN/Creatinine Ratio 12.2     Anion Gap 9.5 mmol/L      eGFR 49.7 mL/min/1.73      Comment: National Kidney Foundation and American Society of Nephrology (ASN) Task Force recommended calculation based on the Chronic Kidney Disease Epidemiology Collaboration (CKD-EPI) equation refit without adjustment for race.       Narrative:      GFR Normal >60  Chronic Kidney Disease <60  Kidney Failure <15    The GFR formula is only valid for adults with stable renal function between ages 18 and 70.    BNP [[id number]]  (Normal) Collected: 01/02/23 2321    Specimen: Blood from Arm, Left Updated: 01/02/23 2351     proBNP 825.0 pg/mL     Narrative:      Among patients with dyspnea, NT-proBNP is highly sensitive for the detection of acute congestive heart failure. In addition NT-proBNP of <300 pg/ml effectively rules out acute congestive heart failure with 99% negative predictive value.    Results may be falsely decreased if patient taking Biotin.      Troponin [[id number]]  (Abnormal) Collected: 01/02/23 2321    Specimen: Blood from Arm, Left Updated: 01/02/23 2358     Troponin T 0.097 ng/mL     Narrative:      Troponin T Reference Range:  <= 0.03 ng/mL-   Negative for AMI  >0.03 ng/mL-     Abnormal for myocardial necrosis.  Clinicians would have to utilize clinical acumen, EKG, Troponin and serial changes to determine if it is an Acute Myocardial Infarction or myocardial injury due to an underlying chronic condition.        Results may be falsely decreased if patient taking Biotin.      CBC Auto Differential [[id number]]  (Abnormal) Collected: 01/02/23 2321    Specimen: Blood from Arm, Left Updated: 01/02/23 2331     WBC 2.95 10*3/mm3      RBC 3.75 10*6/mm3      Hemoglobin 11.4 g/dL      Hematocrit 33.3 %      MCV 88.8 fL      MCH 30.4 pg      MCHC 34.2 g/dL      RDW 13.1 %      RDW-SD 43.0 fl      MPV 9.1 fL      Platelets 118 10*3/mm3      Neutrophil % 64.2 %      Lymphocyte % 22.0 %      Monocyte % 11.5 %      Eosinophil % 1.7 %      Basophil % 0.3 %      Immature Grans % 0.3 %      Neutrophils, Absolute 1.89 10*3/mm3      Lymphocytes, Absolute 0.65 10*3/mm3      Monocytes, Absolute 0.34 10*3/mm3      Eosinophils, Absolute 0.05 10*3/mm3      Basophils, Absolute 0.01 10*3/mm3      Immature Grans, Absolute 0.01 10*3/mm3      nRBC 0.0 /100 WBC     Troponin [[id number]]  (Abnormal) Collected: 01/03/23 0128    Specimen: Blood from Arm, Left Updated: 01/03/23 0400     Troponin T 0.111 ng/mL     Narrative:      Troponin T Reference Range:  <= 0.03 ng/mL-   Negative for AMI  >0.03 ng/mL-     Abnormal for myocardial necrosis.  Clinicians would have to utilize clinical acumen, EKG, Troponin and serial changes to determine if it is an Acute Myocardial Infarction or myocardial injury due to an underlying chronic condition.       Results may be falsely decreased if patient taking Biotin.      Blood Culture - Blood, Arm, Right [[id number]] Collected: 01/03/23 0418    Specimen: Blood from Arm, Right Updated: 01/03/23 0423    Blood Culture - Blood, Arm, Left [[id number]] Collected: 01/03/23 0418    Specimen: Blood from Arm, Left Updated: 01/03/23 0423    Lactic Acid, Plasma [[id number]]  (Normal) Collected: 01/03/23 0418    Specimen: Blood Updated: 01/03/23 0445     Lactate 0.7 mmol/L     Troponin [[id number]]  (Abnormal) Collected: 01/03/23 0418    Specimen: Blood Updated: 01/03/23 0451     Troponin T 0.095 ng/mL     Narrative:      Troponin T  Reference Range:  <= 0.03 ng/mL-   Negative for AMI  >0.03 ng/mL-     Abnormal for myocardial necrosis.  Clinicians would have to utilize clinical acumen, EKG, Troponin and serial changes to determine if it is an Acute Myocardial Infarction or myocardial injury due to an underlying chronic condition.       Results may be falsely decreased if patient taking Biotin.      Procalcitonin [[id number]]  (Normal) Collected: 01/03/23 0418    Specimen: Blood Updated: 01/03/23 1010     Procalcitonin 0.15 ng/mL     Narrative:      As a Marker for Sepsis (Non-Neonates):    1. <0.5 ng/mL represents a low risk of severe sepsis and/or septic shock.  2. >2 ng/mL represents a high risk of severe sepsis and/or septic shock.    As a Marker for Lower Respiratory Tract Infections that require antibiotic therapy:    PCT on Admission    Antibiotic Therapy       6-12 Hrs later    >0.5                Strongly Recommended  >0.25 - <0.5        Recommended   0.1 - 0.25          Discouraged              Remeasure/reassess PCT  <0.1                Strongly Discouraged     Remeasure/reassess PCT    As 28 day mortality risk marker: \"Change in Procalcitonin Result\" (>80% or <=80%) if Day 0 (or Day 1) and Day 4 values are available. Refer to http://[email or web address]-pct-[email or web address]    Change in PCT <=80%  A decrease of PCT levels below or equal to 80% defines a positive change in PCT test result representing a higher risk for 28-day all-cause mortality of patients diagnosed with severe sepsis for septic shock.    Change in PCT >80%  A decrease of PCT levels of more than 80% defines a negative change in PCT result representing a lower risk for 28-day all-cause mortality of patients diagnosed with severe sepsis or septic shock.       Respiratory Panel PCR w/COVID-19(SARS-CoV-2) TONIE/HOWARD/CARMEN/PAD/COR/MAD/ALEXANDER In-House, NP Swab in [facility], 3-4 HR TAT - Swab, Nasopharynx [[id number]]  (Abnormal) Collected: 01/03/23 0734    Specimen: Swab from Nasopharynx Updated:  01/03/23 0911     ADENOVIRUS, PCR Not Detected     Coronavirus 229E Not Detected     Coronavirus HKU1 Not Detected     Coronavirus NL63 Not Detected     Coronavirus OC43 Not Detected     COVID19 Not Detected     Human Metapneumovirus Not Detected     Human Rhinovirus/Enterovirus Not Detected     Influenza A PCR Not Detected     Influenza B PCR Not Detected     Parainfluenza Virus 1 Not Detected     Parainfluenza Virus 2 Not Detected     Parainfluenza Virus 3 Not Detected     Parainfluenza Virus 4 Not Detected     RSV, PCR Detected     Bordetella pertussis pcr Not Detected     Bordetella parapertussis PCR Not Detected     Chlamydophila pneumoniae PCR Not Detected     Mycoplasma pneumo by PCR Not Detected    Narrative:      In the setting of a positive respiratory panel with a viral infection PLUS a negative procalcitonin without other underlying concern for bacterial infection, consider observing off antibiotics or discontinuation of antibiotics and continue supportive care. If the respiratory panel is positive for atypical bacterial infection (Bordetella pertussis, Chlamydophila pneumoniae, or Mycoplasma pneumoniae), consider antibiotic de-escalation to target atypical bacterial infection.    Troponin [[id number]]  (Abnormal) Collected: 01/03/23 0827    Specimen: Blood Updated: 01/03/23 0954     Troponin T 0.097 ng/mL     Narrative:      Troponin T Reference Range:  <= 0.03 ng/mL-   Negative for AMI  >0.03 ng/mL-     Abnormal for myocardial necrosis.  Clinicians would have to utilize clinical acumen, EKG, Troponin and serial changes to determine if it is an Acute Myocardial Infarction or myocardial injury due to an underlying chronic condition.       Results may be falsely decreased if patient taking Biotin.      Basic Metabolic Panel [[id number]]  (Abnormal) Collected: 01/03/23 0827    Specimen: Blood Updated: 01/03/23 0949     Glucose 82 mg/dL      BUN 17 mg/dL      Creatinine 1.23 mg/dL      Sodium 138 mmol/L       Potassium 4.1 mmol/L      Chloride 107 mmol/L      CO2 19.2 mmol/L      Calcium 8.0 mg/dL      BUN/Creatinine Ratio 13.8     Anion Gap 11.8 mmol/L      eGFR 61.6 mL/min/1.73      Comment: National Kidney Foundation and American Society of Nephrology (ASN) Task Force recommended calculation based on the Chronic Kidney Disease Epidemiology Collaboration (CKD-EPI) equation refit without adjustment for race.       Narrative:      GFR Normal >60  Chronic Kidney Disease <60  Kidney Failure <15    The GFR formula is only valid for adults with stable renal function between ages 18 and 70.    CBC Auto Differential [[id number]]  (Abnormal) Collected: 01/03/23 0827    Specimen: Blood Updated: 01/03/23 0919     WBC 2.39 10*3/mm3      RBC 3.87 10*6/mm3      Hemoglobin 11.5 g/dL      Hematocrit 33.9 %      MCV 87.6 fL      MCH 29.7 pg      MCHC 33.9 g/dL      RDW 13.0 %      RDW-SD 41.6 fl      MPV 9.1 fL      Platelets 125 10*3/mm3      Neutrophil % 53.5 %      Lymphocyte % 29.7 %      Monocyte % 11.3 %      Eosinophil % 3.8 %      Basophil % 0.4 %      Immature Grans % 1.3 %      Neutrophils, Absolute 1.28 10*3/mm3      Lymphocytes, Absolute 0.71 10*3/mm3      Monocytes, Absolute 0.27 10*3/mm3      Eosinophils, Absolute 0.09 10*3/mm3      Basophils, Absolute 0.01 10*3/mm3      Immature Grans, Absolute 0.03 10*3/mm3      nRBC 0.0 /100 WBC     Protime-INR [[id number]]  (Abnormal) Collected: 01/03/23 0827    Specimen: Blood Updated: 01/03/23 0933     Protime 16.3 Seconds      INR 1.30          Imaging Results (Last 24 Hours)     Procedure Component Value Units Date/Time    CT Angiogram Chest [[id number]] Collected: 01/03/23 0330     Updated: 01/03/23 0330    Narrative:        Patient: JJ BOURGEOIS  Time Out: 03:30  Exam(s): CTA CHEST     EXAM:    CT Angiography Chest With Intravenous Contrast    CLINICAL HISTORY:     Reason for exam: soa r o PE.    TECHNIQUE:    Axial computed tomographic angiography images of the chest with    intravenous contrast.  CTDI is 3.99 mGy and DLP is 180.6 mGy-cm.  This CT   exam was performed according to the principle of ALARA (As Low As   Reasonably Achievable) by using one or more of the following dose   reduction techniques: automated exposure control, adjustment of the mA   and or kV according to patient size, and or use of iterative   reconstruction technique.    MIP reconstructed images were created and reviewed.    COMPARISON:    CT angiogram chest 5 12 2022.    FINDINGS:    Pulmonary arteries:  Unremarkable.  No pulmonary embolism.    Aorta:  No acute findings.  No thoracic aortic aneurysm.    Lungs:  There is patchy airspace opacities in bilateral lower lobes.    There is architectural distortion in the right lower lobe compatible with   rounded atelectasis.    Pleural space:  Thick-walled right pleural effusion measuring 1.5 cm in   depth is present.  It is similar to prior.  No pneumothorax.    Heart:  Unremarkable.  No cardiomegaly.  No significant pericardial   effusion.  No evidence of RV dysfunction.    Mediastinum:  Postop changes suggestive of an esophagectomy with   gastric pull-up.    Bones joints:  Bones show postoperative changes in the right hemithorax   with thoracotomy involving the right fifth rib.  No acute fracture.  No   dislocation.    Soft tissues:  Unremarkable.    Lymph nodes:  There is mild right adenopathy that is new or more   apparent from the previous exam.    IMPRESSION:         No evidence of pulmonary emboli with chronic postoperative changes in   the mediastinum and right hemithorax showing slightly worse bibasilar   atelectasis in the interval.  Query aspiration pneumonitis.      Impression:          Electronically signed by Harshad Gonzáles MD on 01-03-23 at 0330    XR Chest 1 View [[id number]] Collected: 01/02/23 2358     Updated: 01/03/23 0002    Narrative:      PORTABLE CHEST X-RAY     HISTORY: Shortness of breath. Prior esophagectomy and gastric pull-up.      TECHNIQUE: Portable chest x-ray correlated with CT angiogram chest May  12, 2022.     FINDINGS: Postoperative change from gastric pull-up as was previously  demonstrated. The right thoracic cage is noted as is pleural thickening  laterally on the right. Left heart and mediastinal contours appear  normal. Left lung is clear. No new or acute pulmonary abnormality  identified.       Impression:      Postoperative change in the chest. No acute abnormality  identified.     This report was finalized on 1/2/2023 11:59 PM by Dr. Darian Howell M.D.             Results for orders placed during the hospital encounter of 05/12/22    Adult Transthoracic Echo Complete w/ Color, Spectral and Contrast if necessary per protocol    Interpretation Summary  · Estimated right ventricular systolic pressure from tricuspid regurgitation is mildly elevated (35-45 mmHg). Calculated right ventricular systolic pressure from tricuspid regurgitation is 43 mmHg.  · Left ventricular wall thickness is consistent with mild concentric hypertrophy.  · Estimated left ventricular EF = 56% Left ventricular systolic function is normal.  · Left ventricular diastolic function was normal.      ECG 12 Lead Tachycardia   Preliminary Result   HEART RATE= 121  bpm   RR Interval= 496  ms   MS Interval=   ms   P Horizontal Axis=   deg   P Front Axis=   deg   QRSD Interval= 99  ms   QT Interval= 363  ms   QRS Axis= 14  deg   T Wave Axis= 71  deg   - ABNORMAL ECG -   Atrial flutter with predominant 2:1 AV block   Prolonged QT interval   Artifact in lead(s) I,aVR,aVL,V1,V2,V6   Electronically Signed By:    Date and Time of Study: 2023-01-03 07:22:39      ECG 12 Lead Dyspnea   Preliminary Result   HEART RATE= 79  bpm   RR Interval= 759  ms   MS Interval= 189  ms   P Horizontal Axis= -24  deg   P Front Axis= 10  deg   QRSD Interval= 130  ms   QT Interval= 394  ms   QRS Axis= -28  deg   T Wave Axis= -89  deg   - ABNORMAL ECG -   Sinus rhythm   Right atrial  enlargement   Nonspecific intraventricular conduction delay   Repol abnrm suggests ischemia, inferior leads   Borderline ST elevation, anterolateral leads   Artifact in lead(s) I,II,III,aVF,V1,V2,V3,V4,V5,V6   Electronically Signed By:    Date and Time of Study: 2023-01-02 23:34:22           Assessment/Plan     Active Hospital Problems    Diagnosis  POA   • **Elevated troponin [R77.8]  Yes   • RSV (acute bronchiolitis due to respiratory syncytial virus) [J21.0]  Yes   • Atrial fibrillation (HCC) [I48.91]  Yes   • Dyspnea [R06.00]  Yes   • CKD (chronic kidney disease) stage 2, GFR 60-89 ml/min [N18.2]  Yes   • Chronic anticoagulation [Z79.01]  Not Applicable   • Chronic obstructive pulmonary disease with acute exacerbation (HCC) [J44.1]  Yes   • Iron deficiency [E61.1]  Yes   • Anemia [D64.9]  Yes   • Hypertension [I10]  Yes   • History of DVT (deep vein thrombosis) [Z86.718]  Not Applicable   • History of esophageal cancer [Z85.01]  Yes   • RLS (restless legs syndrome) [G25.81]  Yes      Resolved Hospital Problems   No resolved problems to display.       Mr. Hurtado is a 72 y.o. former smoker with a history of antiphospholipid syndrome on chronic anticoagulation, atrial fibrillation, hypertension, hearing loss, lymphedema BLE, DVT, esophageal carcinoma, prostate cancer that presents to [facility] complaining of shortness of breath and leg swelling & admitted for elevated troponin.      Elevated troponin: Denies current or recent chest pain.  Does not appear with ACS.  Cardiology following noted chronically elevated troponin with normal heart catheterization in May.  No additional cardiac work-up planned at this time.  ASA, statin continued on admission.      Urinary frequency: Ordering urinalysis with culture if indicated.      Chronic obstructive pulmonary disease with acute exacerbation (HCC): Most likely contributing to above due to RSV confirmed on PCR.  Provide prednisone burst, Mucinex twice  daily.  Encourage incentive spirometry.  Scheduled and bronchodilators as needed.  Inhaled corticosteroid/LABA.  Flonase.  Consult PT/OT.       Atrial fibrillation (HCC) /chronic anticoagulation: Rate greater than optimal at present.  Plan rate controlled with increased beta-blocker strength per cardiology.  Anticoagulation continued.      History of esophageal cancer: Denies dysphagia.  HOB > 30.      RLS (restless legs syndrome): Stable appearance.  Mirapex continued nightly.      History of DVT (deep vein thrombosis) / lymphedema: Denies pain.  Reportedly adherent to anticoagulation prior to hospital admission.  2-3+ pitting edema, BLE (resume diuretic per home dose regimen).      Hypertension: BP acceptable acutely.  Beta-blocker increased per cardiology.  Monitor blood pressure for changes.      Anemia /  Iron deficiency:  Serum Hgb stable with trend.  No overt signs of active bleeding.  Labs in a.m.      CKD (chronic kidney disease) stage 2, GFR 60-89 ml/min: Creatinine 1.47 similar compared to priors and improved following gentle IV fluid hydration (stopped for now to avoid fluid volume overload given dietary tolerance).      RSV (acute bronchiolitis due to respiratory syncytial virus): Status post azithromycin and ceftriaxone in ER.  Monitor patient off empiric antibiotic therapy for now given unremarkable procalcitonin.  See plan above.      · I discussed the patient's findings and my recommendations with patient, RN, & Dr. Ortiz.    VTE Prophylaxis - therapeutic enoxaparin  Code Status - Full code.       Carloz Ortiz MD  [facility]  01/03/23  13:17 EST

## 2023-01-03 NOTE — CONSULTS
Patient Name: Jose Hurtado  :1950  72 y.o.    Date of Admission: 2023  Encounter Provider: Astrid Rizo MD  Date of Encounter Visit: 23  Place of Service: [facility] CARDIOLOGY  Referring Provider: Deepak Keyes MD  Patient Care Team:  Brandin Bolton MD as PCP - General (Internal Medicine)  Tapan, George THORPE II, MD as Consulting Physician (Hematology and Oncology)  Jose Inman MD as Consulting Physician (Urology)  Mulugeta Millan MD as Consulting Physician (Gastroenterology)  Jose Christine III, MD as Referring Physician (Thoracic Surgery)  Seth Randhawa MD as Consulting Physician (Ophthalmology)  James Cyr MD as Consulting Physician (Cardiology)      Chief complaint:  Elevated troponin    History of Present Illness:    This is a patient previously followed by Dr. Cyr. He just saw FAYE Norris in the office on 2022. He has hypertension, hyperlipidemia, chronic kidney disease, lymphedema, PVCs, recurrent DVTs and COPD. He has a history of esophageal cancer with prior gastrectomy with gastric pull through in  with chemotherapy and radiation. In 2022 he was in the hospital with chronic cough, nausea, and retching. During that admission he had an echocardiogram 2022 which showed normal LV function, ejection fraction 56%, normal diastolic function, mild left ventricular hypertrophy, and mild pulmonary hypertension. He had a heart catheterization 2022 for elevated troponin. He had normal coronary arteries and normal LV function by heart catheterization.     He came into the hospital with shortness of breath.  CTA of the chest showed no pulmonary emboli.  There were chronic postoperative changes in the mediastinum and right hemithorax with slightly worse bibasilar atelectasis and possible pneumonitis.  He denies any palpitations.    Past Medical History:   Diagnosis Date   • Acute deep vein thrombosis (DVT)  of popliteal vein of left lower extremity (HCC) 03/24/2020   • Anemia    • Anti-phospholipid syndrome (HCC)     On lifelong anticoagulation therapy   • Bladder disorder     LEAKAGE   ON MED  wers pads   • Bleeding hemorrhoids    • Community acquired pneumonia     HISTORY OF IN 2014   • Deep venous thrombosis (HCC) 2006, 2008    Left lower extremity multiple   • Depression    • Esophageal carcinoma (HCC) 12/31/2014    had chemo and radiation prior surgery   • Hemorrhoids    • HH (hiatus hernia)    • History of atrial fibrillation 2015    ONE EPISODE WHILE HOSPITALIZED   • History of kidney stones    • History of nephrolithiasis    • History of pancreatitis     PT STATES MANY YEARS AGO   • History of radiation therapy    • History of transfusion    • Hypertension    • Long-term (current) use of anticoagulants, INR goal 2.0-3.0    • Lymphedema    • Malignant neoplasm of prostate (HCC)    • Other hyperlipidemia 01/30/2018 January 30, 2018 lipid panel risk 12.8%   • Restless legs syndrome    • Sleep apnea     OCCASIONALLY WEARS CPAP   • Squamous carcinoma     on the head       Past Surgical History:   Procedure Laterality Date   • APPENDECTOMY  1950   • BRONCHOSCOPY      (Diagnostic)   • CARDIAC CATHETERIZATION N/A 5/14/2022    Procedure: Left Heart Cath;  Surgeon: Eric So MD;  Location:  [facility];  Service: Cardiology;  Laterality: N/A;   • CARDIAC CATHETERIZATION N/A 5/14/2022    Procedure: Coronary angiography;  Surgeon: Eric So MD;  Location:  [facility];  Service: Cardiology;  Laterality: N/A;   • CARDIAC CATHETERIZATION N/A 5/14/2022    Procedure: Left ventriculography;  Surgeon: Eric So MD;  Location:  [facility];  Service: Cardiology;  Laterality: N/A;   • CATARACT EXTRACTION Bilateral 2014   • COLONOSCOPY  12/15/2014    Complete / Description: EH, IH, torts, stool, follow-up colonoscopy due in 5 years.   •  COLONOSCOPY N/A 6/13/2017    non-thrombosed external hemorrhoids, normal examined ileum, IH   • COLONOSCOPY N/A 2/26/2019    Procedure: COLONOSCOPY with Cold Polypectomy;  Surgeon: Mulugeta Millan MD;  Location: [facility] ENDOSCOPY;  Service: Gastroenterology   • COLONOSCOPY N/A 12/16/2020    Procedure: COLONOSCOPY to cecum into TI;  Surgeon: Mesha Sanchez MD;  Location: [facility] ENDOSCOPY;  Service: Gastroenterology;  Laterality: N/A;  pre: lower GI bleed  post: hemorrhoids    • CYSTOSCOPY W/ LASER LITHOTRIPSY     • ENDOSCOPY N/A 6/13/2017    Procedure: ESOPHAGOGASTRODUODENOSCOPY WITH COLD BIOPSY;  Surgeon: Lake Gonzalez MD;  Location: [facility] ENDOSCOPY;  Service:    • ENDOSCOPY N/A 11/18/2021    Procedure: ESOPHAGOGASTRODUODENOSCOPY WITH  DILATATION WITH FLUOROSCOPY;  Surgeon: Jose Christine III, MD;  Location: [facility] ENDOSCOPY;  Service: Gastroenterology;  Laterality: N/A;  Pre: dysphagia, h/x of adinocarcinoma of esophagus  Post: same   • ESOPHAGECTOMY      April 2015, stage IIB esophageal carcinoma, sub-total resection.   • ESOPHAGECTOMY      Esophagectomy Subtotal Andrea Joe Procedure   • EXCISION LESION  08/2012    Removal of Squamous Cell CA on Head   • HAMMER TOE REPAIR  09/2014    Hammertoe Operation (Each Toe), 10/2014   • HAMMER TOE REPAIR Left 10/3/2017    Procedure: Left second third and fourth distal interphalangeal joint resection with flexor tenotomy;  Surgeon: Mulugeta Lira MD;  Location: [facility] OR OSC;  Service:    • HEMORRHOIDECTOMY N/A 12/23/2020    Procedure: HEMORRHOIDECTOMY;  Surgeon: Billy Julio Jr., MD;  Location: [facility] MAIN OR;  Service: General;  Laterality: N/A;   • HERNIA REPAIR      incisional   • JEJUNOSTOMY      Laparoscopic   • JEJUNOSTOMY      tube removal    • KNEE SURGERY Bilateral 1967, 1973, 1981   • PATELLA SURGERY Left     removed   • PILONIDAL CYST / SINUS EXCISION     • TN ARTHRP KNE CONDYLE&PLATU MEDIAL&LAT COMPARTMENTS Right 3/26/2018     Procedure: TOTAL KNEE ARTHROPLASTY;  Surgeon: Renny Solis MD;  Location: [facility] OR;  Service: Orthopedics   • PROSTATECTOMY  2010   • PYLOROPLASTY     • SPINAL FUSION  02/1998    C 5,6   • TONSILLECTOMY     • UPPER GASTROINTESTINAL ENDOSCOPY  12/15/2014    LA Grade D esophagitis, Pardo's, HH, multiple duodenal ulcers   • VENTRAL/INCISIONAL HERNIA REPAIR N/A 4/14/2016    Procedure: VENTRAL/INCISIONAL HERNIA REPAIR, open, with mesh, and component separation;  Surgeon: Darren Rivas MD;  Location: [facility] MAIN OR;  Service:          Prior to Admission medications    Medication Sig Start Date End Date Taking? Authorizing Provider   atorvastatin (LIPITOR) 40 MG tablet Take 1 tablet by mouth Every Night. 9/21/22  Yes Tonya Allen APRN   cyanocobalamin 1000 MCG/ML injection  5/1/22  Yes Anup Zamora MD   Eliquis 5 MG tablet tablet TAKE ONE TABLET BY MOUTH EVERY 12 HOURS 11/14/22  Yes George Phelan II, MD   furosemide (Lasix) 80 MG tablet Take 0.5 tablets by mouth Daily for 30 days. 12/21/22 1/20/23 Yes Silva Wells APRN   Gemtesa 75 MG tablet Daily. 3/28/22  Yes Anup Zamora MD   metoprolol tartrate (LOPRESSOR) 25 MG tablet Take 1 tablet by mouth 2 (Two) Times a Day. 9/21/22  Yes Tonya Allen APRN   pantoprazole (PROTONIX) 40 MG EC tablet TAKE ONE TABLET BY MOUTH TWICE A DAY BEFORE A MEAL 9/7/21  Yes Brandin Bolton MD   PARoxetine (PAXIL) 40 MG tablet TAKE ONE TABLET BY MOUTH EVERY MORNING 10/6/22  Yes Brandin Bolton MD   potassium chloride (MICRO-K) 10 MEQ CR capsule Take 1 capsule by mouth Daily. 9/21/22  Yes Tonya Allen APRN   pramipexole (MIRAPEX) 1.5 MG tablet TAKE ONE TABLET BY MOUTH TWICE A DAY  Patient taking differently: Take 3 mg by mouth Every Night. 7/5/22  Yes Tex De La Rosa APRN   tiotropium bromide monohydrate (Spiriva Respimat) 2.5 MCG/ACT aerosol solution inhaler Inhale 2 puffs Daily. 12/5/22  Yes Brandin Bolton MD   albuterol sulfate  (90 Base)  MCG/ACT inhaler Inhale 1 puff Every 4 (Four) Hours As Needed for Wheezing. 20   Terri Hutchinson APRN       No Known Allergies    Social History     Socioeconomic History   • Marital status:      Spouse name: Lena   • Number of children: 0   • Years of education: College   Tobacco Use   • Smoking status: Former     Packs/day: 2.00     Years: 3.00     Pack years: 6.00     Types: Cigarettes     Quit date: 1974     Years since quittin.0   • Smokeless tobacco: Former     Types: Chew   • Tobacco comments:     Caffeine - coffee and soda    Vaping Use   • Vaping Use: Never used   Substance and Sexual Activity   • Alcohol use: Yes     Alcohol/week: 3.0 standard drinks     Types: 1 Glasses of wine, 1 Cans of beer, 1 Shots of liquor per week     Comment: 4 drinks/week   • Drug use: Not Currently     Types: Marijuana     Comment: hx marijuana use \"a long time ago\"   • Sexual activity: Defer       Family History   Problem Relation Age of Onset   • Cerebral aneurysm Mother         cerebral artery aneurysm ( age 56)   • Prostate cancer Brother 68   • Anxiety disorder Father    • Suicide Attempts Father          of suicide   • Cancer Father         bladder   • No Known Problems Brother    • Pancreatic cancer Nephew    • Colon cancer Neg Hx    • Esophageal cancer Neg Hx    • Dementia Neg Hx    • Malig Hyperthermia Neg Hx        REVIEW OF SYSTEMS:   All other systems reviewed and negative.        Objective:     Vitals:    23 0501 23 0629 23 0734 23 1055   BP: 145/92  132/87 153/92   BP Location:   Right arm Right arm   Patient Position:   Lying Lying   Pulse: 111  120 (!) 122   Resp:   16 16   Temp:   98.4 °F (36.9 °C) 98.4 °F (36.9 °C)   TempSrc:   Oral Oral   SpO2: 91%  94% 93%   Weight:  95.4 kg (210 lb 6.4 oz)     Height:  195.6 cm (77\")       Body mass index is 24.95 kg/m².    Intake/Output Summary (Last 24 hours) at 1/3/2023 1101  Last data filed at 1/3/2023 0891  Gross per  24 hour   Intake 0 ml   Output --   Net 0 ml     Constitutional: He is oriented to person, place, and time. He appears well-developed. He does not appear ill.   HENT:   Head: Normocephalic and atraumatic. Head is without contusion.   Right Ear: Hearing normal. No drainage.   Left Ear: Hearing normal. No drainage.   Nose: No nasal deformity. No epistaxis.   Eyes: Lids are normal. Right eye exhibits no exudate. Left eye exhibits no exudate.  Neck: No JVD present. Carotid bruit is not present. No tracheal deviation present. No thyroid mass and no thyromegaly present.   Cardiovascular: Tachycardic and irregular  Pulmonary/Chest: Effort normal and breath sounds normal.   Abdominal: Soft. Normal appearance and bowel sounds are normal. There is no tenderness.   Musculoskeletal: Normal range of motion.        Right shoulder: He exhibits no deformity.        Left shoulder: He exhibits no deformity.   Neurological: He is alert and oriented to person, place, and time. He has normal strength.   Skin: Skin is warm, dry and intact. No rash noted.   Psychiatric: He has a normal mood and affect. His behavior is normal. Thought content normal.   Vitals reviewed        Lab Review:     Results from last 7 days   Lab Units 01/03/23  0827 01/02/23  2321   SODIUM mmol/L 138 137   POTASSIUM mmol/L 4.1 4.2   CHLORIDE mmol/L 107 104   CO2 mmol/L 19.2* 23.5   BUN mg/dL 17 18   CREATININE mg/dL 1.23 1.47*   CALCIUM mg/dL 8.0* 8.8   BILIRUBIN mg/dL  --  0.6   ALK PHOS U/L  --  114   ALT (SGPT) U/L  --  18   AST (SGOT) U/L  --  25   GLUCOSE mg/dL 82 115*     Results from last 7 days   Lab Units 01/03/23  0827 01/03/23  0418 01/03/23  0128   TROPONIN T ng/mL 0.097* 0.095* 0.111*     Results from last 7 days   Lab Units 01/03/23  0827   WBC 10*3/mm3 2.39*   HEMOGLOBIN g/dL 11.5*   HEMATOCRIT % 33.9*   PLATELETS 10*3/mm3 125*     Results from last 7 days   Lab Units 01/03/23  0827   INR  1.30*             I personally viewed and interpreted the  patient's EKG/Telemetry data.        Assessment and Plan:       1.  Chronically elevated troponin.  No acute myocardial infarction.  Normal heart catheterization in May 2022.  No acute EKG changes.  No further cardiac work-up necessary.  2.  History of DVT/lymphedema.  On Eliquis.  3.  History of esophageal cancer status postgastrectomy and gastric pull-through in 2015.  Status post chemotherapy and radiation  4.  Hypertension  5.  Hyperlipidemia  6.  COPD  7.  Atrial fibrillation.  Increase metoprolol.  Already anticoagulated with Eliquis.  Currently on Lovenox full dose.  8.  Possible aspiration pneumonitis.    -Plan is for rate control.  Continue anticoagulation.  -Chronically elevated troponin with normal heart catheterization in May.  No need to check further troponins.    Astrid Rizo MD  01/03/23  11:01 EST

## 2023-01-04 ENCOUNTER — HOSPITAL ENCOUNTER (OUTPATIENT)
Dept: CT IMAGING | Facility: HOSPITAL | Age: 73
End: 2023-01-04
Payer: MEDICARE

## 2023-01-04 ENCOUNTER — READMISSION MANAGEMENT (OUTPATIENT)
Dept: CALL CENTER | Facility: HOSPITAL | Age: 73
End: 2023-01-04
Payer: MEDICARE

## 2023-01-04 ENCOUNTER — APPOINTMENT (OUTPATIENT)
Dept: PHYSICAL THERAPY | Facility: HOSPITAL | Age: 73
End: 2023-01-04
Payer: MEDICARE

## 2023-01-04 VITALS
HEART RATE: 55 BPM | SYSTOLIC BLOOD PRESSURE: 130 MMHG | TEMPERATURE: 98.3 F | RESPIRATION RATE: 18 BRPM | HEIGHT: 77 IN | WEIGHT: 210.4 LBS | DIASTOLIC BLOOD PRESSURE: 64 MMHG | BODY MASS INDEX: 24.84 KG/M2 | OXYGEN SATURATION: 92 %

## 2023-01-04 PROBLEM — J96.01 ACUTE RESPIRATORY FAILURE WITH HYPOXIA: Status: RESOLVED | Noted: 2023-01-04 | Resolved: 2023-01-04

## 2023-01-04 PROBLEM — J96.01 ACUTE RESPIRATORY FAILURE WITH HYPOXIA: Status: ACTIVE | Noted: 2023-01-04

## 2023-01-04 PROBLEM — J44.1 COPD EXACERBATION: Status: ACTIVE | Noted: 2023-01-04

## 2023-01-04 LAB
ANION GAP SERPL CALCULATED.3IONS-SCNC: 9.7 MMOL/L (ref 5–15)
BUN SERPL-MCNC: 22 MG/DL (ref 8–23)
BUN/CREAT SERPL: 15.3 (ref 7–25)
CALCIUM SPEC-SCNC: 8 MG/DL (ref 8.6–10.5)
CHLORIDE SERPL-SCNC: 107 MMOL/L (ref 98–107)
CO2 SERPL-SCNC: 21.3 MMOL/L (ref 22–29)
CREAT SERPL-MCNC: 1.44 MG/DL (ref 0.76–1.27)
DEPRECATED RDW RBC AUTO: 44.9 FL (ref 37–54)
EGFRCR SERPLBLD CKD-EPI 2021: 51 ML/MIN/1.73
ERYTHROCYTE [DISTWIDTH] IN BLOOD BY AUTOMATED COUNT: 13.3 % (ref 12.3–15.4)
GLUCOSE SERPL-MCNC: 162 MG/DL (ref 65–99)
HCT VFR BLD AUTO: 30.4 % (ref 37.5–51)
HGB BLD-MCNC: 10.2 G/DL (ref 13–17.7)
MCH RBC QN AUTO: 30.8 PG (ref 26.6–33)
MCHC RBC AUTO-ENTMCNC: 33.6 G/DL (ref 31.5–35.7)
MCV RBC AUTO: 91.8 FL (ref 79–97)
PLATELET # BLD AUTO: 117 10*3/MM3 (ref 140–450)
PMV BLD AUTO: 9.4 FL (ref 6–12)
POTASSIUM SERPL-SCNC: 4.5 MMOL/L (ref 3.5–5.2)
RBC # BLD AUTO: 3.31 10*6/MM3 (ref 4.14–5.8)
SODIUM SERPL-SCNC: 138 MMOL/L (ref 136–145)
WBC NRBC COR # BLD: 2.41 10*3/MM3 (ref 3.4–10.8)

## 2023-01-04 PROCEDURE — 80048 BASIC METABOLIC PNL TOTAL CA: CPT | Performed by: NURSE PRACTITIONER

## 2023-01-04 PROCEDURE — A9270 NON-COVERED ITEM OR SERVICE: HCPCS | Performed by: NURSE PRACTITIONER

## 2023-01-04 PROCEDURE — 96372 THER/PROPH/DIAG INJ SC/IM: CPT

## 2023-01-04 PROCEDURE — 94799 UNLISTED PULMONARY SVC/PX: CPT

## 2023-01-04 PROCEDURE — 63710000001 PREDNISONE PER 1 MG: Performed by: NURSE PRACTITIONER

## 2023-01-04 PROCEDURE — 63710000001 PAROXETINE 20 MG TABLET: Performed by: NURSE PRACTITIONER

## 2023-01-04 PROCEDURE — 85027 COMPLETE CBC AUTOMATED: CPT | Performed by: NURSE PRACTITIONER

## 2023-01-04 PROCEDURE — 63710000001 FUROSEMIDE 40 MG TABLET: Performed by: NURSE PRACTITIONER

## 2023-01-04 PROCEDURE — G0378 HOSPITAL OBSERVATION PER HR: HCPCS

## 2023-01-04 PROCEDURE — 63710000001 PANTOPRAZOLE 40 MG TABLET DELAYED-RELEASE: Performed by: NURSE PRACTITIONER

## 2023-01-04 PROCEDURE — 63710000001 TIOTROPIUM BROMIDE MONOHYDRATE 2.5 MCG/ACT AEROSOL SOLUTION 4 G INHALER: Performed by: NURSE PRACTITIONER

## 2023-01-04 PROCEDURE — 99232 SBSQ HOSP IP/OBS MODERATE 35: CPT | Performed by: NURSE PRACTITIONER

## 2023-01-04 PROCEDURE — 25010000002 ENOXAPARIN PER 10 MG: Performed by: NURSE PRACTITIONER

## 2023-01-04 PROCEDURE — 63710000001 GUAIFENESIN 600 MG TABLET SUSTAINED-RELEASE 12 HOUR: Performed by: NURSE PRACTITIONER

## 2023-01-04 RX ORDER — METOPROLOL TARTRATE 50 MG/1
50 TABLET, FILM COATED ORAL 2 TIMES DAILY
Qty: 60 TABLET | Refills: 1 | Status: SHIPPED | OUTPATIENT
Start: 2023-01-04 | End: 2023-03-05

## 2023-01-04 RX ORDER — GUAIFENESIN 600 MG/1
600 TABLET, EXTENDED RELEASE ORAL EVERY 12 HOURS SCHEDULED
Qty: 14 TABLET | Refills: 0 | Status: SHIPPED | OUTPATIENT
Start: 2023-01-04 | End: 2023-01-11

## 2023-01-04 RX ORDER — PREDNISONE 20 MG/1
40 TABLET ORAL
Qty: 6 TABLET | Refills: 0 | Status: SHIPPED | OUTPATIENT
Start: 2023-01-05 | End: 2023-01-08

## 2023-01-04 RX ADMIN — ENOXAPARIN SODIUM 100 MG: 100 INJECTION SUBCUTANEOUS at 12:36

## 2023-01-04 RX ADMIN — Medication 10 ML: at 09:06

## 2023-01-04 RX ADMIN — TIOTROPIUM BROMIDE INHALATION SPRAY 2 PUFF: 3.12 SPRAY, METERED RESPIRATORY (INHALATION) at 07:52

## 2023-01-04 RX ADMIN — PANTOPRAZOLE SODIUM 40 MG: 40 TABLET, DELAYED RELEASE ORAL at 06:03

## 2023-01-04 RX ADMIN — GUAIFENESIN 600 MG: 600 TABLET, EXTENDED RELEASE ORAL at 09:06

## 2023-01-04 RX ADMIN — FUROSEMIDE 40 MG: 40 TABLET ORAL at 09:04

## 2023-01-04 RX ADMIN — BUDESONIDE AND FORMOTEROL FUMARATE DIHYDRATE 2 PUFF: 160; 4.5 AEROSOL RESPIRATORY (INHALATION) at 07:51

## 2023-01-04 RX ADMIN — PREDNISONE 40 MG: 20 TABLET ORAL at 09:06

## 2023-01-04 RX ADMIN — PAROXETINE HYDROCHLORIDE HEMIHYDRATE 40 MG: 20 TABLET, FILM COATED ORAL at 06:03

## 2023-01-04 RX ADMIN — FLUTICASONE PROPIONATE 2 SPRAY: 50 SPRAY, METERED NASAL at 09:06

## 2023-01-04 NOTE — SIGNIFICANT NOTE
01/04/23 0826   OTHER   Discipline physical therapist   Therapy Assessment/Plan (PT)   Criteria for Skilled Interventions Met (PT) no problems identified which require skilled intervention  (pt sba with mobility per nsg doc; declines DC needs to CCP; no indication for acute skilled PT needs.  Continue to mobilize with nsg supervision.)

## 2023-01-04 NOTE — PROGRESS NOTES
Patient Name: Jose Hurtado  :1950  72 y.o.      Patient Care Team:  Brandin Bolton MD as PCP - General (Internal Medicine)  Tapan, George THORPE II, MD as Consulting Physician (Hematology and Oncology)  Jose Inman MD as Consulting Physician (Urology)  Mulugeta Millan MD as Consulting Physician (Gastroenterology)  Jose Christine III, MD as Referring Physician (Thoracic Surgery)  Seth Randhawa MD as Consulting Physician (Ophthalmology)  James Cyr MD as Consulting Physician (Cardiology)    Chief Complaint: follow up elevated troponin, atrial fibrillation.    Interval History: he feels well. Congested cough but not coughing anything up.       Objective   Vital Signs  Temp:  [98.3 °F (36.8 °C)-98.6 °F (37 °C)] 98.3 °F (36.8 °C)  Heart Rate:  [55-66] 55  Resp:  [16-18] 18  BP: (129-130)/(64-76) 130/64    Intake/Output Summary (Last 24 hours) at 2023 1417  Last data filed at 2023 1155  Gross per 24 hour   Intake 840 ml   Output 550 ml   Net 290 ml     Flowsheet Rows    Flowsheet Row First Filed Value   Admission Height 195.6 cm (77\") Documented at 2023 2310   Admission Weight 97.5 kg (215 lb) Documented at 2023 2331          Physical Exam:   General Appearance:    Alert, cooperative, in no acute distress   Lungs:     Clear to auscultation.  Normal respiratory effort and rate.      Heart:    Regular rhythm and normal rate, normal S1 and S2, no murmurs, gallops or rubs.     Chest Wall:    No abnormalities observed   Abdomen:     Soft, nontender, positive bowel sounds.     Extremities:   no cyanosis, clubbing or edema.  No marked joint deformities.  Adequate musculoskeletal strength.       Results Review:    Results from last 7 days   Lab Units 23  0314   SODIUM mmol/L 138   POTASSIUM mmol/L 4.5   CHLORIDE mmol/L 107   CO2 mmol/L 21.3*   BUN mg/dL 22   CREATININE mg/dL 1.44*   GLUCOSE mg/dL 162*   CALCIUM mg/dL 8.0*     Results from last 7 days   Lab Units  01/03/23  0827 01/03/23  0418 01/03/23  0128   TROPONIN T ng/mL 0.097* 0.095* 0.111*     Results from last 7 days   Lab Units 01/04/23  0314   WBC 10*3/mm3 2.41*   HEMOGLOBIN g/dL 10.2*   HEMATOCRIT % 30.4*   PLATELETS 10*3/mm3 117*     Results from last 7 days   Lab Units 01/03/23  0827   INR  1.30*                       Medication Review:   atorvastatin, 40 mg, Oral, Nightly  budesonide-formoterol, 2 puff, Inhalation, BID - RT  enoxaparin, 1 mg/kg, Subcutaneous, Q12H  fluticasone, 2 spray, Each Nare, Daily  furosemide, 40 mg, Oral, Daily  guaiFENesin, 600 mg, Oral, Q12H  ipratropium-albuterol, 3 mL, Nebulization, 4x Daily - RT  metoprolol tartrate, 100 mg, Oral, BID  metoprolol tartrate, 50 mg, Oral, Once  pantoprazole, 40 mg, Oral, BID AC  PARoxetine, 40 mg, Oral, QAM  pramipexole, 3 mg, Oral, Nightly  predniSONE, 40 mg, Oral, Daily With Breakfast  sodium chloride, 10 mL, Intravenous, Q12H  tiotropium bromide monohydrate, 2 puff, Inhalation, Daily              Assessment & Plan       1.  Chronically elevated troponin.  No acute myocardial infarction.  Normal heart catheterization in May 2022.  No acute EKG changes.  No further cardiac work-up necessary.  2.  History of DVT/lymphedema.  On Eliquis at baseline.  3.  History of esophageal cancer status postgastrectomy and gastric pull-through in 2015.  Status post chemotherapy and radiation  4.  Hypertension  5.  Hyperlipidemia  6.  COPD  7.  Atrial fibrillation.  Increased metoprolol.   Currently on Lovenox full dose. Go back to apixaban at discharge.  8.  Possible aspiration pneumonitis.     No objection to discharge. Will reschedule the appointment he missed with FAYE Ascencio.     FAYE Martinez  [facility]  01/04/23  14:17 EST

## 2023-01-04 NOTE — PLAN OF CARE
Goal Outcome Evaluation:  Plan of Care Reviewed With: patient        Progress: improving  Outcome Evaluation: No complaints this shift. Rested comfortably. Reports breathing improved. Remains on 2L NC. SR/SB on tele HR 50-60's. Continue progress towards goals.

## 2023-01-04 NOTE — DISCHARGE SUMMARY
Patient Name: Jose Hurtado  : 1950  MRN: [id number]    Date of Admission: 2023  Date of Discharge:  2023  Primary Care Physician: Brandin Bolton MD      Chief Complaint:   Shortness of Breath and Leg Swelling      Discharge Diagnoses     Active Hospital Problems    Diagnosis  POA   • **Elevated troponin [R77.8]  Yes   • COPD exacerbation (HCC) [J44.1]  Yes   • RSV (acute bronchiolitis due to respiratory syncytial virus) [J21.0]  Yes   • Atrial fibrillation (HCC) [I48.91]  Yes   • Dyspnea [R06.00]  Yes   • CKD (chronic kidney disease) stage 2, GFR 60-89 ml/min [N18.2]  Yes   • Chronic anticoagulation [Z79.01]  Not Applicable   • Chronic obstructive pulmonary disease with acute exacerbation (HCC) [J44.1]  Yes   • Iron deficiency [E61.1]  Yes   • Anemia [D64.9]  Yes   • Hypertension [I10]  Yes   • History of DVT (deep vein thrombosis) [Z86.718]  Not Applicable   • History of esophageal cancer [Z85.01]  Yes   • RLS (restless legs syndrome) [G25.81]  Yes      Resolved Hospital Problems    Diagnosis Date Resolved POA   • Acute respiratory failure with hypoxia (HCC) [J96.01] 2023 Unknown        Brief Admitting HPI     Mr. Hurtado is a 72 y.o. former smoker with a history of antiphospholipid syndrome on chronic anticoagulation, atrial fibrillation, hypertension, hearing loss, lymphedema BLE, DVT, esophageal carcinoma that presents to [facility] complaining of shortness of breath and leg swelling.     Lives alone.  States a couple days ago feeling unwell with cough, chills, & shortness of breath; therefore, went to [facility] ER for evaluation.     Denies use of supplemental oxygen at home, chest pain or history of CAD.     Serial troponin positive--treatment dose enoxaparin and aspirin provided in ER.    Hospital Course     Pt admitted for acute hypoxic respiratory failure in setting of RSV.  He also had elevated troponins on admission up from baseline, which cardiology attributed to the  chronic myocardial injury, with no need for additional work-up at this time as he had a normal catheterization in May 2022.  Additionally his metoprolol was increased for A. fib with RVR with improvement in his rate.  He was treated symptomatically with breathing treatments and steroids with improvement in his symptoms.  He was weaned to room air.  He will be discharged with a short course of steroids.  Of note his kidney function was slightly up from admission at time of discharge.  He does have some CKD and based off his trend that this is likely a progression.  Nevertheless, discussed that he should have a repeat BMP within 1 week to ensure stability.  He is ready for discharge and has no additional needs at this time.    Discharge Plan     Acute hypoxic respiratory failure  RSV  COPD w/ acute exacerbation  -CTA (1/3/23): No PE; bibasilar atelectasis  -Symbicort, Spiriva  -DuoNebs   -prednisone 40mg daily x5d     Chronic myocardial injury  -troponin 0.097 OA > 0.111 > 0.095  -Cardiology consulted  -normal cath 5/2022, no additional w/u at this time     HLD  -atorvastatin 40mg qhs     Afib w/ RVR  -RC: metoprolol 25mg BID -> increase to 50mg BID  -AC: started on BID Lovenox, can resume apixaan 1/4/23     Hx DVT  -resume apixaban as above     Depression  -Paxil 40mg qam    Day of Discharge     Subjective:  Sitting up in bed and breakfast this morning.  Breathing is much improved.  Ready for discharge.    Physical Exam:  Temp:  [98.3 °F (36.8 °C)-98.6 °F (37 °C)] 98.3 °F (36.8 °C)  Heart Rate:  [55-66] 55  Resp:  [16-18] 18  BP: (129-130)/(64-76) 130/64  Body mass index is 24.95 kg/m².  Physical Exam  Constitutional:       General: He is not in acute distress.     Appearance: Normal appearance.   Cardiovascular:      Rate and Rhythm: Normal rate and regular rhythm.      Heart sounds: No murmur heard.    No gallop.   Pulmonary:      Effort: Pulmonary effort is normal. No respiratory distress.      Breath sounds:  Normal breath sounds. No wheezing or rales.   Abdominal:      General: Abdomen is flat. There is no distension.      Palpations: Abdomen is soft.      Tenderness: There is no abdominal tenderness.   Musculoskeletal:         General: Normal range of motion.      Right lower leg: Edema present.      Left lower leg: Edema present.   Skin:     General: Skin is warm.   Neurological:      General: No focal deficit present.      Mental Status: He is alert and oriented to person, place, and time.   Psychiatric:         Mood and Affect: Mood normal.         Behavior: Behavior normal.         Consultants     Consult Orders (all) (From admission, onward)     Start     Ordered    01/03/23 0409  Inpatient Cardiology Consult  Once        Specialty:  Cardiology  Provider:  Darian Maldonado Jr., MD    01/03/23 0409 01/03/23 0406  [facility] (on-call MD unless specified)  Once        Specialty:  Cardiology  Provider:  (Not yet assigned)    01/03/23 0405 01/03/23 0342  [facility] (on-call MD unless specified) Details  Once        Specialty:  Hospitalist  Provider:  Deepak Keyes MD    01/03/23 0341              Procedures     * Surgery not found *      Imaging Results (All)     Procedure Component Value Units Date/Time    CT Angiogram Chest [[id number]] Collected: 01/03/23 0330     Updated: 01/03/23 0330    Narrative:        Patient: JJ BOURGEOIS  Time Out: 03:30  Exam(s): CTA CHEST     EXAM:    CT Angiography Chest With Intravenous Contrast    CLINICAL HISTORY:     Reason for exam: soa r o PE.    TECHNIQUE:    Axial computed tomographic angiography images of the chest with   intravenous contrast.  CTDI is 3.99 mGy and DLP is 180.6 mGy-cm.  This CT   exam was performed according to the principle of ALARA (As Low As   Reasonably Achievable) by using one or more of the following dose   reduction techniques: automated exposure control, adjustment of the mA   and or kV according to patient size, and or use of iterative   reconstruction  technique.    MIP reconstructed images were created and reviewed.    COMPARISON:    CT angiogram chest 5 12 2022.    FINDINGS:    Pulmonary arteries:  Unremarkable.  No pulmonary embolism.    Aorta:  No acute findings.  No thoracic aortic aneurysm.    Lungs:  There is patchy airspace opacities in bilateral lower lobes.    There is architectural distortion in the right lower lobe compatible with   rounded atelectasis.    Pleural space:  Thick-walled right pleural effusion measuring 1.5 cm in   depth is present.  It is similar to prior.  No pneumothorax.    Heart:  Unremarkable.  No cardiomegaly.  No significant pericardial   effusion.  No evidence of RV dysfunction.    Mediastinum:  Postop changes suggestive of an esophagectomy with   gastric pull-up.    Bones joints:  Bones show postoperative changes in the right hemithorax   with thoracotomy involving the right fifth rib.  No acute fracture.  No   dislocation.    Soft tissues:  Unremarkable.    Lymph nodes:  There is mild right adenopathy that is new or more   apparent from the previous exam.    IMPRESSION:         No evidence of pulmonary emboli with chronic postoperative changes in   the mediastinum and right hemithorax showing slightly worse bibasilar   atelectasis in the interval.  Query aspiration pneumonitis.      Impression:          Electronically signed by Harshad Gonzáles MD on 01-03-23 at 0330    XR Chest 1 View [[id number]] Collected: 01/02/23 2358     Updated: 01/03/23 0002    Narrative:      PORTABLE CHEST X-RAY     HISTORY: Shortness of breath. Prior esophagectomy and gastric pull-up.     TECHNIQUE: Portable chest x-ray correlated with CT angiogram chest May  12, 2022.     FINDINGS: Postoperative change from gastric pull-up as was previously  demonstrated. The right thoracic cage is noted as is pleural thickening  laterally on the right. Left heart and mediastinal contours appear  normal. Left lung is clear. No new or acute pulmonary  abnormality  identified.       Impression:      Postoperative change in the chest. No acute abnormality  identified.     This report was finalized on 1/2/2023 11:59 PM by Dr. Darian Howell M.D.           Results for orders placed in visit on 10/10/22    Duplex Venous Lower Extremity - Left CAR    Interpretation Summary  •  Chronic left lower extremity deep vein thrombosis noted in the popliteal and gastrocnemius.  •  All other left sided veins appeared normal.    Results for orders placed during the hospital encounter of 05/12/22    Adult Transthoracic Echo Complete w/ Color, Spectral and Contrast if necessary per protocol    Interpretation Summary  · Estimated right ventricular systolic pressure from tricuspid regurgitation is mildly elevated (35-45 mmHg). Calculated right ventricular systolic pressure from tricuspid regurgitation is 43 mmHg.  · Left ventricular wall thickness is consistent with mild concentric hypertrophy.  · Estimated left ventricular EF = 56% Left ventricular systolic function is normal.  · Left ventricular diastolic function was normal.    Pertinent Labs     Results from last 7 days   Lab Units 01/04/23 0314 01/03/23 0827 01/02/23  2321   WBC 10*3/mm3 2.41* 2.39* 2.95*   HEMOGLOBIN g/dL 10.2* 11.5* 11.4*   PLATELETS 10*3/mm3 117* 125* 118*     Results from last 7 days   Lab Units 01/04/23 0314 01/03/23 0827 01/02/23  2321   SODIUM mmol/L 138 138 137   POTASSIUM mmol/L 4.5 4.1 4.2   CHLORIDE mmol/L 107 107 104   CO2 mmol/L 21.3* 19.2* 23.5   BUN mg/dL 22 17 18   CREATININE mg/dL 1.44* 1.23 1.47*   GLUCOSE mg/dL 162* 82 115*   EGFR mL/min/1.73 51.0* 61.6 49.7*     Results from last 7 days   Lab Units 01/02/23  2321   ALBUMIN g/dL 3.8   BILIRUBIN mg/dL 0.6   ALK PHOS U/L 114   AST (SGOT) U/L 25   ALT (SGPT) U/L 18     Results from last 7 days   Lab Units 01/04/23 0314 01/03/23 0827 01/02/23  2321   CALCIUM mg/dL 8.0* 8.0* 8.8   ALBUMIN g/dL  --   --  3.8       Results from last 7 days    Lab Units 01/03/23  0827 01/03/23  0418 01/03/23  0128 01/02/23  2321   TROPONIN T ng/mL 0.097* 0.095* 0.111* 0.097*   PROBNP pg/mL  --   --   --  825.0           Invalid input(s): LDLCALC  Results from last 7 days   Lab Units 01/03/23  0418   BLOODCX  No growth at 24 hours  No growth at 24 hours     Results from last 7 days   Lab Units 01/03/23  0734   COVID19  Not Detected       Test Results Pending at Discharge     Pending Labs     Order Current Status    Blood Culture - Blood, Arm, Left Preliminary result    Blood Culture - Blood, Arm, Right Preliminary result          Discharge Details        Discharge Medications      New Medications      Instructions Start Date   guaiFENesin 600 MG 12 hr tablet  Commonly known as: MUCINEX   600 mg, Oral, Every 12 Hours Scheduled      predniSONE 20 MG tablet  Commonly known as: DELTASONE   40 mg, Oral, Daily With Breakfast   Start Date: January 5, 2023        Changes to Medications      Instructions Start Date   metoprolol tartrate 50 MG tablet  Commonly known as: LOPRESSOR  What changed:   · medication strength  · how much to take   50 mg, Oral, 2 Times Daily      pramipexole 1.5 MG tablet  Commonly known as: MIRAPEX  What changed:   · how much to take  · when to take this   TAKE ONE TABLET BY MOUTH TWICE A DAY         Continue These Medications      Instructions Start Date   albuterol sulfate  (90 Base) MCG/ACT inhaler  Commonly known as: PROVENTIL HFA;VENTOLIN HFA;PROAIR HFA   1 puff, Inhalation, Every 4 Hours PRN      atorvastatin 40 MG tablet  Commonly known as: LIPITOR   40 mg, Oral, Nightly      cyanocobalamin 1000 MCG/ML injection   No dose, route, or frequency recorded.      Eliquis 5 MG tablet tablet  Generic drug: apixaban   TAKE ONE TABLET BY MOUTH EVERY 12 HOURS      furosemide 80 MG tablet  Commonly known as: Lasix   40 mg, Oral, Daily      Gemtesa 75 MG tablet  Generic drug: Vibegron   Daily      pantoprazole 40 MG EC tablet  Commonly known as:  PROTONIX   TAKE ONE TABLET BY MOUTH TWICE A DAY BEFORE A MEAL      PARoxetine 40 MG tablet  Commonly known as: PAXIL   TAKE ONE TABLET BY MOUTH EVERY MORNING      potassium chloride 10 MEQ CR capsule  Commonly known as: MICRO-K   10 mEq, Oral, Daily      Spiriva Respimat 2.5 MCG/ACT aerosol solution inhaler  Generic drug: tiotropium bromide monohydrate   2 puffs, Inhalation, Daily             No Known Allergies    Discharge Disposition:  Home or Self Care      Discharge Diet:  Diet Order   Procedures   • Diet: Cardiac Diets; Healthy Heart (2-3 Na+); Texture: Regular Texture (IDDSI 7); Fluid Consistency: Thin (IDDSI 0)       Discharge Activity:   Activity Instructions     Activity as Tolerated            CODE STATUS:    Code Status and Medical Interventions:   Ordered at: 01/03/23 0409     Code Status (Patient has no pulse and is not breathing):    CPR (Attempt to Resuscitate)     Medical Interventions (Patient has pulse or is breathing):    Full Support       Future Appointments   Date Time Provider Department Center   1/11/2023  2:30 PM Caroline Vasquez PT [facility]   1/13/2023  3:00 PM Caroline Vasquez PT [facility]   2/9/2023  1:15 PM Brandin Bolton MD [facility]   2/17/2023 10:00 AM LAB CHAIR 1 [facility] [place]   2/17/2023 10:20 AM George Phelan II, MD [facility]   3/24/2023 11:30 AM Darian Maldonado Jr., MD [facility]   3/29/2023 10:00 AM [facility] CT 2 [facility]   4/3/2023 10:00 AM Katarina Capone MD [facility]     Additional Instructions for the Follow-ups that You Need to Schedule     Basic Metabolic Panel    Jan 09, 2023 (Approximate)      Release to patient: Routine Release            Follow-up Information     Brandin Bolton MD Follow up in 1 week(s).    Specialty: Internal Medicine  Contact information:  [address] [place] [address]  [phone]                         Additional Instructions for the Follow-ups that You Need to  Schedule     Basic Metabolic Panel    Jan 09, 2023 (Approximate)      Release to patient: Routine Release           Time Spent on Discharge:  Greater than 30 minutes      Carloz Ortiz MD  [facility]  01/04/23  09:43 EST

## 2023-01-05 ENCOUNTER — TRANSITIONAL CARE MANAGEMENT TELEPHONE ENCOUNTER (OUTPATIENT)
Dept: CALL CENTER | Facility: HOSPITAL | Age: 73
End: 2023-01-05
Payer: MEDICARE

## 2023-01-05 ENCOUNTER — APPOINTMENT (OUTPATIENT)
Dept: PHYSICAL THERAPY | Facility: HOSPITAL | Age: 73
End: 2023-01-05
Payer: MEDICARE

## 2023-01-05 NOTE — OUTREACH NOTE
Prep Survey    Flowsheet Row Responses   [facility] facility patient discharged from? [place]   Is LACE score < 7 ? No   Eligibility [facility]   Date of Admission 01/02/23   Date of Discharge 01/04/23   Discharge Disposition Home or Self Care   Discharge diagnosis Elevated troponin/COPD   Does the patient have one of the following disease processes/diagnoses(primary or secondary)? COPD   Does the patient have Home health ordered? No   Is there a DME ordered? No   Prep survey completed? Yes          FLOR A - Registered Nurse

## 2023-01-05 NOTE — OUTREACH NOTE
Call Center TCM Note    Flowsheet Row Responses   [facility] patient discharged from? [place]   Does the patient have one of the following disease processes/diagnoses(primary or secondary)? COPD   TCM attempt successful? No   Unsuccessful attempts Attempt 2          Melly Webster MA    1/5/2023, 16:11 EST

## 2023-01-05 NOTE — OUTREACH NOTE
Call Center TCM Note    Flowsheet Row Responses   [facility] patient discharged from? [place]   Does the patient have one of the following disease processes/diagnoses(primary or secondary)? COPD   TCM attempt successful? No   Unsuccessful attempts Attempt 1          Melly Webster MA    1/5/2023, 13:28 EST

## 2023-01-06 ENCOUNTER — APPOINTMENT (OUTPATIENT)
Dept: PHYSICAL THERAPY | Facility: HOSPITAL | Age: 73
End: 2023-01-06
Payer: MEDICARE

## 2023-01-06 ENCOUNTER — TRANSITIONAL CARE MANAGEMENT TELEPHONE ENCOUNTER (OUTPATIENT)
Dept: CALL CENTER | Facility: HOSPITAL | Age: 73
End: 2023-01-06
Payer: MEDICARE

## 2023-01-06 NOTE — OUTREACH NOTE
Call Center TCM Note    Flowsheet Row Responses   [facility] patient discharged from? [place]   Does the patient have one of the following disease processes/diagnoses(primary or secondary)? COPD   TCM attempt successful? No   Unsuccessful attempts Attempt 3          Mervat Villavicencio RN    1/6/2023, 10:10 EST

## 2023-01-08 LAB
BACTERIA SPEC AEROBE CULT: NORMAL
BACTERIA SPEC AEROBE CULT: NORMAL

## 2023-01-09 ENCOUNTER — APPOINTMENT (OUTPATIENT)
Dept: PHYSICAL THERAPY | Facility: HOSPITAL | Age: 73
End: 2023-01-09
Payer: MEDICARE

## 2023-01-10 ENCOUNTER — APPOINTMENT (OUTPATIENT)
Dept: PHYSICAL THERAPY | Facility: HOSPITAL | Age: 73
End: 2023-01-10
Payer: MEDICARE

## 2023-01-11 ENCOUNTER — TREATMENT (OUTPATIENT)
Dept: PHYSICAL THERAPY | Facility: CLINIC | Age: 73
End: 2023-01-11
Payer: MEDICARE

## 2023-01-11 ENCOUNTER — APPOINTMENT (OUTPATIENT)
Dept: PHYSICAL THERAPY | Facility: HOSPITAL | Age: 73
End: 2023-01-11
Payer: MEDICARE

## 2023-01-11 DIAGNOSIS — M25.661 KNEE JOINT STIFFNESS, BILATERAL: ICD-10-CM

## 2023-01-11 DIAGNOSIS — R29.898 WEAKNESS OF BOTH HIPS: ICD-10-CM

## 2023-01-11 DIAGNOSIS — M25.662 KNEE JOINT STIFFNESS, BILATERAL: ICD-10-CM

## 2023-01-11 DIAGNOSIS — M25.652 HIP JOINT STIFFNESS, BILATERAL: ICD-10-CM

## 2023-01-11 DIAGNOSIS — M25.651 HIP JOINT STIFFNESS, BILATERAL: ICD-10-CM

## 2023-01-11 DIAGNOSIS — R26.9 GAIT DISTURBANCE: Primary | ICD-10-CM

## 2023-01-11 PROCEDURE — 97530 THERAPEUTIC ACTIVITIES: CPT | Performed by: PHYSICAL THERAPIST

## 2023-01-11 PROCEDURE — 97110 THERAPEUTIC EXERCISES: CPT | Performed by: PHYSICAL THERAPIST

## 2023-01-11 PROCEDURE — 97140 MANUAL THERAPY 1/> REGIONS: CPT | Performed by: PHYSICAL THERAPIST

## 2023-01-11 NOTE — PROGRESS NOTES
Physical Therapy Re Certification Of Plan of Care  [address]  [place] [address]  [phone]  Patient: Jose Hurtado   : 1950  Diagnosis/ICD-10 Code:  Gait disturbance [R26.9]  Referring practitioner: Brandin Bolton MD  Date of Initial Visit: Type: THERAPY  Noted: 2022  Today's Date: 2023  Patient seen for 18 sessions (only 3 sessions since last progress note )         Visit Diagnoses:    ICD-10-CM ICD-9-CM   1. Gait disturbance  R26.9 781.2   2. Weakness of both hips  R29.898 729.89   3. Hip joint stiffness, bilateral  M25.651 719.55    M25.652    4. Knee joint stiffness, bilateral  M25.661 719.56    M25.662          Jose Hurtado reports: Overall pt feels he has made some progress with being able to stand straighter when walking and legs a bit stronger. Unfortunately was hospitaized last week for 3 days due to RSV. STill coughing but feeling some better  Subjective Questionnaire: LEFS: 35/80  Clinical Progress: unchanged  Home Program Compliance: Yes  Treatment has included: therapeutic exercise, neuromuscular re-education, manual therapy, therapeutic activity and gait training      Subjective Evaluation    History of Present Illness    Subjective comment: In hospital - RSV. Antibiotics in hospital. Now Mucinex. Picking up lymphedema next week. Using compression boots. Cough and congestion. I think I walk and stand up a little better.         Objective          Passive Range of Motion   Left Hip   Flexion: WFL  Extension: Left hip passive extension: +10.     Right Hip   Flexion: WFL  Extension: 0 degrees     Joint Play   Left Hip   Hypomobile in the anterior hip capsule and lateral hip capsule.    Right Hip     Hypomobile in the anterior hip capsule and lateral hip capsule    Strength/Myotome Testing     Left Hip   Planes of Motion   Flexion: 5  Extension: 4-  Abduction: 5    Right Hip   Planes of Motion   Flexion: 5  Extension: 4-  Abduction: 4          Assessment/Plan  STGs  4 weeks:  1. Pt to perform gym and HEP with no increase in strain to groin or lower back MET  2. Pt to start utilizing the pool at his gym for endurance walking and exercise NO  3. PT to assess pt's knee brace and help with possible purchase of MCL type brace to slow down valgus instability NO  4. ROM L knee to improve from 0- to 0- NO  5. Pt to state decreased pain levels both knees by 50% improvement MET     LTGs 12 weeks:  1. LEFS score to improve from 29/80 to > 45/80 PROGRESSING  2. Pt to be able to manage steps reciprocally with one railing. NO  3. Pt to state increased confidence in walking community distances PROGRESSING  4. Patellar pain R knee improved by 75% PROGRESSING  5. Brace L knee for support as needed to prevent progression of OA NO     Recommendations: Continue as planned  Timeframe: 6 weeks  Prognosis to achieve goals: fair      Timed:         Manual Therapy:    8     mins  50243;     Therapeutic Exercise:    20     mins  06234;     Neuromuscular Wil:        mins  11972;    Therapeutic Activity:     10     mins  94467;     Gait Training:           mins  86737;     Ultrasound:          mins  50324;    Ionto                                   mins   56726  Self Care                            mins   13785  Canalith Repos         mins 87939      Un-Timed:  Electrical Stimulation:         mins  88626 ( );  Dry Needling          mins self-pay  Traction          mins 54025  Re-Eval                               mins  91062      Timed Treatment:   38   mins   Total Treatment:     38   mins          PT: Caroline Vasquez PT     [place] License:  [id number]    Electronically signed by Caroline Vasquez PT, 01/11/23, 2:33 PM EST    Certification Period: 1/12/2023 thru 4/11/2023  I certify that the therapy services are furnished while this patient is under my care.  The services outlined above are required by this patient, and will be reviewed every 90 days.         Physician  Signature:__________________________________________________    PHYSICIAN: Brandin Bolton MD  NPI: [id number]                                      DATE:  :     Please sign and return via fax to [email or web address] . Thank you, Psychiatric Physical Therapy

## 2023-01-12 ENCOUNTER — APPOINTMENT (OUTPATIENT)
Dept: PHYSICAL THERAPY | Facility: HOSPITAL | Age: 73
End: 2023-01-12
Payer: MEDICARE

## 2023-01-13 ENCOUNTER — TELEPHONE (OUTPATIENT)
Dept: PHYSICAL THERAPY | Facility: OTHER | Age: 73
End: 2023-01-13
Payer: MEDICARE

## 2023-01-13 ENCOUNTER — APPOINTMENT (OUTPATIENT)
Dept: PHYSICAL THERAPY | Facility: HOSPITAL | Age: 73
End: 2023-01-13
Payer: MEDICARE

## 2023-01-13 NOTE — TELEPHONE ENCOUNTER
Caller: Jose Hurtado    Relationship: Self    Best call back number: [phone]    What was the call regarding: PATIENT CANCELLED APPT TODAY BECAUSE HE IS NOT GOING TO MAKE IT

## 2023-01-16 ENCOUNTER — APPOINTMENT (OUTPATIENT)
Dept: PHYSICAL THERAPY | Facility: HOSPITAL | Age: 73
End: 2023-01-16
Payer: MEDICARE

## 2023-01-16 RX ORDER — PANTOPRAZOLE SODIUM 40 MG/1
TABLET, DELAYED RELEASE ORAL
Qty: 180 TABLET | Refills: 3 | Status: SHIPPED | OUTPATIENT
Start: 2023-01-16

## 2023-01-18 ENCOUNTER — HOSPITAL ENCOUNTER (OUTPATIENT)
Dept: PHYSICAL THERAPY | Facility: HOSPITAL | Age: 73
Setting detail: THERAPIES SERIES
Discharge: HOME | End: 2023-01-18
Payer: MEDICARE

## 2023-01-18 DIAGNOSIS — I89.0 LYMPHEDEMA: Primary | ICD-10-CM

## 2023-01-18 PROCEDURE — 97140 MANUAL THERAPY 1/> REGIONS: CPT

## 2023-01-18 NOTE — THERAPY PROGRESS REPORT/RE-CERT
Outpatient Physical Therapy Lymphedema Progress Note  [facility]     Patient Name: Jose Hurtado  : 1950  MRN: [id number]  Today's Date: 2023        Visit Date: 2023    Visit Dx:    ICD-10-CM ICD-9-CM   1. Lymphedema  I89.0 457.1       Patient Active Problem List   Diagnosis   • History of esophageal cancer   • Adenomatous polyp of colon   • Malignant neoplasm of prostate ([facility])   • RLS (restless legs syndrome)   • History of DVT (deep vein thrombosis)   • Recurrent major depressive disorder, in partial remission ([facility])   • Hypertension   • Anemia   • Insomnia due to other mental disorder   • Peripheral polyneuropathy   • B12 deficiency   • Postsurgical malabsorption   • Lymphedema   • Iron deficiency   • Gastroparesis   • Acute deep vein thrombosis (DVT) of popliteal vein of left lower extremity ([facility])   • Tremor of both hands   • Gastrointestinal hemorrhage   • Acute blood loss anemia   • Pancytopenia ([facility])   • Chronic venous hypertension due to DVT   • Chronic obstructive pulmonary disease with acute exacerbation ([facility])   • Bleeding hemorrhoid   • Malabsorption of iron   • Iron deficiency anemia   • Pleuritic chest pain   • History of esophageal cancer   • Abnormal CT scan   • Generalized weakness   • Chronic anticoagulation   • Urinary tract infection due to extended-spectrum beta lactamase (ESBL) producing Escherichia coli   • CKD (chronic kidney disease) stage 2, GFR 60-89 ml/min   • Dysphagia   • PVC's (premature ventricular contractions)   • NSTEMI (non-ST elevated myocardial infarction) ([facility])   • Bronchitis   • Dyspnea   • Elevated troponin   • RSV (acute bronchiolitis due to respiratory syncytial virus)   • Atrial fibrillation ([facility])   • COPD exacerbation ([facility])         Lymphedema     Row Name 23 1100             Subjective Pain    Able to rate subjective pain? yes  -KA      Pre-Treatment Pain Level 0  -KA      Post-Treatment Pain Level 0  -KA         Subjective Comments     Subjective Comments Pt reports that his right leg has puffed up again.  Brought bandaging supplies from home, thinks he still has a box of stockinette and knows he has some foam roll still at home.  -KA         Lymphedema Assessment    Lymphedema Classification RLE:;LLE:;stage 2 (Spontaneously Irreversible)  -KA      Infections or Cellulitis? no  -KA         Posture/Observations    Posture/Observations Comments Pt amb independently with very flexed posture, genu valgus. Note abnormal contours bilat lower legs.  -KA         General ROM    GENERAL ROM COMMENTS Gen BLE WFL  -KA         MMT (Manual Muscle Testing)    General MMT Comments Gen BLE at least 4-/5  -KA         Lymphedema Edema Assessment    [place] bilateral:;negative  -KA      Edema Assessment Comment Mod + 2+ edema left ankle to just below knee; mod 1+ to 2+ edema right lower leg ankle to just below knee. Abnormal lower leg contours jolly left.  -KA         Skin Changes/Observations    Skin Observations Comment Light redness and dryness noted BLE, no open wounds. Some firmness/mild fibrotic changes noted in BLE, most significantly in L posterior ankle.  -KA         Lymphedema Sensation    Lymphedema Sensation Comments Intact to lt touch. No reports of N/T.  -KA         Lymphedema Measurements    Measurement Type(s) Circumferential  -KA      Circumferential Areas Lower extremities  -KA         BLE Circumferential (cm)    Measurement Location 1 Knee  -KA      Left 1 40.9 cm  -KA      Right 1 39.1 cm  -KA      Measurement Location 2 10cm below knee  -KA      Left 2 43.4 cm  -KA      Right 2 41.8 cm  -KA      Measurement Location 3 20cm below knee  -KA      Left 3 47.8 cm  -KA      Right 3 42.6 cm  -KA      Measurement Location 4 30cm below knee  -KA      Left 4 40.7 cm  -KA      Right 4 35.8 cm  -KA      Measurement Location 5 Ankle  -KA      Left 5 34 cm  -KA      Right 5 31.1 cm  -KA      Measurement Location 6 Midfoot  -KA      Left 6 26.8 cm  -KA       Right 6 25.8 cm  -KA      LLE Circumferential Total 233.6 cm  -KA      RLE Circumferential Total 216.2 cm  -KA         Compression/Skin Care    Compression/Skin Care skin care;wrapping location;bandaging  -KA      Skin Care washed/dried;moisturizing lotion applied  Eucerin  -KA      Wrapping Location lower extremity  -KA      Wrapping Location LE bilateral:;foot to knee  -KA      Bandage Layers cotton liner;padding/fluff layer;short-stretch bandages (comment size/quantity)  -KA      Bandaging Comments Tg9, Artiflex x 2 to each leg, Rosidal K 3-10 cm, 1-12 cm to each leg, hospital socks, pt's shoes  -KA      Bandaging Technique circumferential/spiral;moderate compression  -KA            User Key  (r) = Recorded By, (t) = Taken By, (c) = Cosigned By    Initials Name Provider Type    Rubina Amaya, PT Physical Therapist                               PT Assessment/Plan     Row Name 01/18/23 1226          PT Assessment    Assessment Comments Pt returns for first treatment session following intial evaluation.  Girth measurements reassessed and are comparable to what was taken at initial evaluation in December 2022.  Pt does have some firmness noted in BLE, especially around L ankle which is new since last episode of care.  Began skin care and compression bandaging today, used 4 SS bandages per leg and applied moderate compression.  Recommend exchanging artiflex to Rosidal soft to address firmness; pt to bring from home.  Pt remains appropriate for skilled physical therapy to reduce edema, improve skin condition, reduce infection risk and improve self management of condition.  -KA     Rehab Potential Good  -     Patient would benefit from skilled therapy intervention Yes  -KA        PT Plan    PT Frequency 5x/week  -KA     Predicted Duration of Therapy Intervention (PT) 4 weeks  -KA     Planned CPT's? PT RE-EVAL: 24429;PT THER PROC EA 15 MIN: 54010;PT THER ACT EA 15 MIN: 03873;PT MANUAL THERAPY EA 15 MIN: 01023;PT  NEUROMUSC RE-EDUCATION EA 15 MIN: 91578;PT GAIT TRAINING EA 15 MIN: 71470;PT SELF CARE/HOME MGMT/TRAIN EA 15: 97664  -     Physical Therapy Interventions (Optional Details) bandaging;home exercise program;manual lymphatic drainage;patient/family education;other (see comments)  Skin care  -KA     PT Plan Comments Assess response to bandaging, modify as indicated.  Begin self MLD when able.  -KA           User Key  (r) = Recorded By, (t) = Taken By, (c) = Cosigned By    Initials Name Provider Type    Rubina Amaya, PT Physical Therapist                    OP Exercises     Row Name 01/18/23 1219 01/18/23 1100          Subjective Comments    Subjective Comments -- Pt reports that his right leg has puffed up again.  Brought bandaging supplies from home, thinks he still has a box of stockinette and knows he has some foam roll still at home.  -DEVONTE        Subjective Pain    Able to rate subjective pain? -- yes  -KA     Pre-Treatment Pain Level -- 0  -KA     Post-Treatment Pain Level -- 0  -KA        Total Minutes    27247 - PT Manual Therapy Minutes 49  -KA --           User Key  (r) = Recorded By, (t) = Taken By, (c) = Cosigned By    Initials Name Provider Type    Rubina Amaya, PT Physical Therapist                        Manual Rx (last 36 hours)     Manual Treatments     Row Name 01/18/23 1219             Total Minutes    73598 - PT Manual Therapy Minutes 49  -KA            User Key  (r) = Recorded By, (t) = Taken By, (c) = Cosigned By    Initials Name Provider Type    Rubina Amaya, PT Physical Therapist                 PT OP Goals     Row Name 01/18/23 1200          PT Short Term Goals    STG Date to Achieve 02/01/23  -KA     STG 1 Pt demo awareness of condition and precautions for improved prevention, management, care of symptoms, and ease of transition to self-care of condition.  -KA     STG 1 Progress New  -KA     STG 2 Pt demo decreased net edema of >/=5-10cm for decreased edema symptoms,  decreased risk of infection, and improved skin care.  -     STG 2 Progress New  -     STG 3 Patient independent and compliant with home exercise program (as able) focused on range of motion and flexibility to improve lymphatic flow and discomfort.  -     STG 3 Progress New  -        Long Term Goals    LTG Date to Achieve 02/15/23  -KA     LTG 1 Pt/family independent with self care techniques for self-management of condition.  -KA     LTG 1 Progress New  -     LTG 2 Pt demo decreased net edema of >/=10-20cm for decreased edema symptoms, decreased risk of infection, and improved skin care.  -KA     LTG 2 Progress New  -KA     LTG 3 Pt/family independent with compression garments as indicated for self-management of condition.  -     LTG 3 Progress New  -        Time Calculation    PT Goal Re-Cert Due Date 03/27/23  -           User Key  (r) = Recorded By, (t) = Taken By, (c) = Cosigned By    Initials Name Provider Type    Rubina Amaya, PT Physical Therapist                Therapy Education  Education Details: Discussed bandaging technique.  Pt instructed to bring Rosidal soft and any other bandaging supplies he has at home in with him to next visit.  Recommend Rosidal soft for calves due to firmness.  Briefly discussed LTC, pt expressing desire to drop from his usual 30-40 mmHg to 20-30 due to difficulty applying stockings even using aids, will continue to discuss options, may need a 20-30 mmHg with higher containment ability.  Pt to return all supplies to clinic tomorrow, remove bandaging and shower just prior to appt if possible.  Given: Edema management, Bandaging/dressing change  Program: New  How Provided: Verbal  Provided to: Patient  Level of Understanding: Verbalized              Time Calculation:   Start Time: 1130  Stop Time: 1219  Time Calculation (min): 49 min  Timed Charges  29577 - PT Manual Therapy Minutes: 49  Total Minutes  Timed Charges Total Minutes: 49   Total Minutes: 49    Therapy Charges for Today     Code Description Service Date Service Provider Modifiers Qty    83635037162 HC PT MANUAL THERAPY EA 15 MIN 1/18/2023 Rubina Powers, PT GP 3                    Rubina Powers, PT  1/18/2023

## 2023-01-19 ENCOUNTER — HOSPITAL ENCOUNTER (OUTPATIENT)
Dept: PHYSICAL THERAPY | Facility: HOSPITAL | Age: 73
Setting detail: THERAPIES SERIES
Discharge: HOME | End: 2023-01-19
Payer: MEDICARE

## 2023-01-19 DIAGNOSIS — I89.0 LYMPHEDEMA: Primary | ICD-10-CM

## 2023-01-19 PROCEDURE — 97140 MANUAL THERAPY 1/> REGIONS: CPT

## 2023-01-19 NOTE — THERAPY TREATMENT NOTE
Outpatient Physical Therapy Lymphedema Treatment Note  [facility]     Patient Name: Joes Hurtado  : 1950  MRN: [id number]  Today's Date: 2023        Visit Date: 2023    Visit Dx:    ICD-10-CM ICD-9-CM   1. Lymphedema  I89.0 457.1       Patient Active Problem List   Diagnosis   • History of esophageal cancer   • Adenomatous polyp of colon   • Malignant neoplasm of prostate ([facility])   • RLS (restless legs syndrome)   • History of DVT (deep vein thrombosis)   • Recurrent major depressive disorder, in partial remission ([facility])   • Hypertension   • Anemia   • Insomnia due to other mental disorder   • Peripheral polyneuropathy   • B12 deficiency   • Postsurgical malabsorption   • Lymphedema   • Iron deficiency   • Gastroparesis   • Acute deep vein thrombosis (DVT) of popliteal vein of left lower extremity ([facility])   • Tremor of both hands   • Gastrointestinal hemorrhage   • Acute blood loss anemia   • Pancytopenia ([facility])   • Chronic venous hypertension due to DVT   • Chronic obstructive pulmonary disease with acute exacerbation ([facility])   • Bleeding hemorrhoid   • Malabsorption of iron   • Iron deficiency anemia   • Pleuritic chest pain   • History of esophageal cancer   • Abnormal CT scan   • Generalized weakness   • Chronic anticoagulation   • Urinary tract infection due to extended-spectrum beta lactamase (ESBL) producing Escherichia coli   • CKD (chronic kidney disease) stage 2, GFR 60-89 ml/min   • Dysphagia   • PVC's (premature ventricular contractions)   • NSTEMI (non-ST elevated myocardial infarction) ([facility])   • Bronchitis   • Dyspnea   • Elevated troponin   • RSV (acute bronchiolitis due to respiratory syncytial virus)   • Atrial fibrillation ([facility])   • COPD exacerbation ([facility])         Lymphedema     Row Name 23 1200             Subjective Pain    Able to rate subjective pain? yes  -KD      Pre-Treatment Pain Level 0  -KD         Subjective Comments    Subjective Comments States he did fine with  the bandages yesterday and last night.  -KD         Manual Lymphatic Drainage    Manual Lymphatic Drainage Comments Short neck, B axilla, B IA, diaphragmatic breathing, L inguinals, LLE  -KD         Compression/Skin Care    Compression/Skin Care skin care;wrapping location;bandaging;remove bandages  -KD      Skin Care moisturizing lotion applied  Eucerin  -KD      Wrapping Location lower extremity  -KD      Wrapping Location LE bilateral:;foot to knee  -KD      Bandage Layers cotton liner;padding/fluff layer;short-stretch bandages (comment size/quantity)  -KD      Bandaging Comments Tg9, Artiflex x 2  right foot to knee, Artiflex x 1 left foot & ankle/Rosidal Soft left ankle to knee, Rosidal K 3-10 cm & 1-12 cm to each leg, hospital socks, pt's shoes  -KD      Bandaging Technique circumferential/spiral;moderate compression  -KD      Remove Bandages Pt removed bandages at home  -KD            User Key  (r) = Recorded By, (t) = Taken By, (c) = Cosigned By    Initials Name Provider Type    Ceci Esposito, PT Physical Therapist                               PT Assessment/Plan     Row Name 01/19/23 1236          PT Assessment    Assessment Comments Pt tolerated bandaging well from last session, as expected. He brought in one foam roll so it was applied to left leg today. Thinks he may have more supplies at home, but feels sure it's stockinette. Discussed possibly purchasing more supplies if needed, to which he is aggreable.  -KD        PT Plan    PT Plan Comments Cont CDT, assist pt in ordering more bandaging supplies if needed.  -KD           User Key  (r) = Recorded By, (t) = Taken By, (c) = Cosigned By    Initials Name Provider Type    Ceci Esposito, PT Physical Therapist                    OP Exercises     Row Name 01/19/23 1240 01/19/23 1200          Subjective Comments    Subjective Comments -- States he did fine with the bandages yesterday and last night.  -KD        Subjective Pain    Able to rate  subjective pain? -- yes  -KD     Pre-Treatment Pain Level -- 0  -KD        Total Minutes    00399 - PT Manual Therapy Minutes 55  -KD --           User Key  (r) = Recorded By, (t) = Taken By, (c) = Cosigned By    Initials Name Provider Type    Ceci Esposito PT Physical Therapist                        Manual Rx (last 36 hours)     Manual Treatments     Row Name 01/19/23 1240             Total Minutes    45775 - PT Manual Therapy Minutes 55  -KD            User Key  (r) = Recorded By, (t) = Taken By, (c) = Cosigned By    Initials Name Provider Type    Ceci Esposito PT Physical Therapist                    Therapy Education  Education Details: Discussed bandaging supplies with pt--thinks he may have a few more things at home, probably stockinette.  Given: Bandaging/dressing change  Program: Reinforced  How Provided: Verbal  Provided to: Patient  Level of Understanding: Verbalized              Time Calculation:   Start Time: 1115  Stop Time: 1210  Time Calculation (min): 55 min  Total Timed Code Minutes- PT: 55 minute(s)  Timed Charges  94096 - PT Manual Therapy Minutes: 55  Total Minutes  Timed Charges Total Minutes: 55   Total Minutes: 55   Therapy Charges for Today     Code Description Service Date Service Provider Modifiers Qty    54365884583 HC PT MANUAL THERAPY EA 15 MIN 1/19/2023 Ceci Ruby, PT GP 4                    Ceci Ruby PT  1/19/2023

## 2023-01-20 ENCOUNTER — OFFICE VISIT (OUTPATIENT)
Dept: CARDIOLOGY | Facility: CLINIC | Age: 73
End: 2023-01-20
Payer: MEDICARE

## 2023-01-20 ENCOUNTER — HOSPITAL ENCOUNTER (OUTPATIENT)
Dept: PHYSICAL THERAPY | Facility: HOSPITAL | Age: 73
Setting detail: THERAPIES SERIES
Discharge: HOME | End: 2023-01-20
Payer: MEDICARE

## 2023-01-20 VITALS
HEART RATE: 55 BPM | WEIGHT: 220 LBS | OXYGEN SATURATION: 94 % | SYSTOLIC BLOOD PRESSURE: 114 MMHG | HEIGHT: 77 IN | BODY MASS INDEX: 25.98 KG/M2 | DIASTOLIC BLOOD PRESSURE: 56 MMHG

## 2023-01-20 DIAGNOSIS — I10 PRIMARY HYPERTENSION: Primary | ICD-10-CM

## 2023-01-20 DIAGNOSIS — I48.0 PAF (PAROXYSMAL ATRIAL FIBRILLATION): ICD-10-CM

## 2023-01-20 DIAGNOSIS — I49.3 PVC'S (PREMATURE VENTRICULAR CONTRACTIONS): ICD-10-CM

## 2023-01-20 DIAGNOSIS — I50.32 CHRONIC DIASTOLIC CONGESTIVE HEART FAILURE: ICD-10-CM

## 2023-01-20 DIAGNOSIS — N18.32 STAGE 3B CHRONIC KIDNEY DISEASE: ICD-10-CM

## 2023-01-20 DIAGNOSIS — E78.5 HYPERLIPIDEMIA, UNSPECIFIED HYPERLIPIDEMIA TYPE: ICD-10-CM

## 2023-01-20 DIAGNOSIS — I89.0 LYMPHEDEMA: Primary | ICD-10-CM

## 2023-01-20 PROCEDURE — 99214 OFFICE O/P EST MOD 30 MIN: CPT | Performed by: NURSE PRACTITIONER

## 2023-01-20 PROCEDURE — 97140 MANUAL THERAPY 1/> REGIONS: CPT

## 2023-01-20 RX ORDER — FUROSEMIDE 80 MG
40 TABLET ORAL 2 TIMES DAILY
Start: 2023-01-20 | End: 2023-02-10

## 2023-01-20 NOTE — THERAPY TREATMENT NOTE
Outpatient Physical Therapy Lymphedema Treatment Note  [facility]     Patient Name: Jose Hurtado  : 1950  MRN: [id number]  Today's Date: 2023        Visit Date: 2023    Visit Dx:    ICD-10-CM ICD-9-CM   1. Lymphedema  I89.0 457.1       Patient Active Problem List   Diagnosis   • History of esophageal cancer   • Adenomatous polyp of colon   • Malignant neoplasm of prostate ([facility])   • RLS (restless legs syndrome)   • History of DVT (deep vein thrombosis)   • Recurrent major depressive disorder, in partial remission ([facility])   • Hypertension   • Anemia   • Insomnia due to other mental disorder   • Peripheral polyneuropathy   • B12 deficiency   • Postsurgical malabsorption   • Lymphedema   • Iron deficiency   • Gastroparesis   • Acute deep vein thrombosis (DVT) of popliteal vein of left lower extremity ([facility])   • Tremor of both hands   • Gastrointestinal hemorrhage   • Acute blood loss anemia   • Pancytopenia ([facility])   • Chronic venous hypertension due to DVT   • Chronic obstructive pulmonary disease with acute exacerbation ([facility])   • Bleeding hemorrhoid   • Malabsorption of iron   • Iron deficiency anemia   • Pleuritic chest pain   • History of esophageal cancer   • Abnormal CT scan   • Generalized weakness   • Chronic anticoagulation   • Urinary tract infection due to extended-spectrum beta lactamase (ESBL) producing Escherichia coli   • CKD (chronic kidney disease) stage 2, GFR 60-89 ml/min   • Dysphagia   • PVC's (premature ventricular contractions)   • NSTEMI (non-ST elevated myocardial infarction) ([facility])   • Bronchitis   • Dyspnea   • Elevated troponin   • RSV (acute bronchiolitis due to respiratory syncytial virus)   • Atrial fibrillation ([facility])   • COPD exacerbation ([facility])         Lymphedema     Row Name 23 1100             Subjective Pain    Able to rate subjective pain? yes  -KA      Pre-Treatment Pain Level 0  -KA      Post-Treatment Pain Level 0  -KA         Subjective Comments     Subjective Comments Pt running late to scheduled appointment, has just from cardiologist.  Reports he did not have time to remove bandaging.  No issues with bandaging but still too soon for him to notice any specific difference with treatment.  -KA         Compression/Skin Care    Compression/Skin Care skin care;wrapping location;bandaging;remove bandages  -      Skin Care washed/dried;moisturizing lotion applied  -KA      Wrapping Location lower extremity  -KA      Wrapping Location LE bilateral:;foot to knee  -KA      Bandage Layers cotton liner;padding/fluff layer;short-stretch bandages (comment size/quantity)  -KA      Bandaging Comments Tg9, Artiflex x 2  right foot to knee, Artiflex x 1 left foot & ankle/Rosidal Soft left ankle to knee, Rosidal K 3-10 cm & 1-12 cm to each leg, hospital socks, pt's shoes  -KA      Bandaging Technique circumferential/spiral;moderate compression  -KA      Remove Bandages Therapist assisted with removal of bandaging  -            User Key  (r) = Recorded By, (t) = Taken By, (c) = Cosigned By    Initials Name Provider Type    Rubina Amaya, PT Physical Therapist                               PT Assessment/Plan     Row Name 01/20/23 7898          PT Assessment    Assessment Comments Pt arrived 8 minutes late to scheduled appointment due to coming directly from cardiologist.  Upon removal of bandages, contours of LLE seem improved, will formally reassess girth measurements on 1/25 after one week of treatment.  Provided skin care and reapplied compression bandaging using moderate compression and spiral technique.  -KA     Rehab Potential Good  -     Patient/caregiver participated in establishment of treatment plan and goals Yes  -KA     Patient would benefit from skilled therapy intervention Yes  -KA        PT Plan    PT Plan Comments Resume MLD, assist with ordering more bandaging supplies if needed.  -           User Key  (r) = Recorded By, (t) = Taken By, (c) =  Cosigned By    Initials Name Provider Type    Rubina Amaya PT Physical Therapist                    OP Exercises     Row Name 01/20/23 1408 01/20/23 1100          Subjective Comments    Subjective Comments -- Pt running late to scheduled appointment, has just from cardiologist.  Reports he did not have time to remove bandaging.  No issues with bandaging but still too soon for him to notice any specific difference with treatment.  -KA        Subjective Pain    Able to rate subjective pain? -- yes  -KA     Pre-Treatment Pain Level -- 0  -KA     Post-Treatment Pain Level -- 0  -KA        Total Minutes    60688 - PT Manual Therapy Minutes 43  -KA --           User Key  (r) = Recorded By, (t) = Taken By, (c) = Cosigned By    Initials Name Provider Type    Rubina Amaya PT Physical Therapist                        Manual Rx (last 36 hours)     Manual Treatments     Row Name 01/20/23 1408             Total Minutes    65493 - PT Manual Therapy Minutes 43  -KA            User Key  (r) = Recorded By, (t) = Taken By, (c) = Cosigned By    Initials Name Provider Type    Rubina Amaya PT Physical Therapist                                   Time Calculation:   Start Time: 1138  Stop Time: 1221  Time Calculation (min): 43 min  Timed Charges  59368 - PT Manual Therapy Minutes: 43  Total Minutes  Timed Charges Total Minutes: 43   Total Minutes: 43   Therapy Charges for Today     Code Description Service Date Service Provider Modifiers Qty    20769984107 HC PT MANUAL THERAPY EA 15 MIN 1/20/2023 Rubina Powers, PT GP 3                    Rubina Powers PT  1/20/2023

## 2023-01-20 NOTE — PROGRESS NOTES
Date of Office Visit: 23  Encounter Provider: FAYE Darling  Place of Service: [facility] CARDIOLOGY  Patient Name: Jose uHrtado  :1950    Chief Complaint   Patient presents with   • Acute deep vein thrombosis (DVT) of popliteal vein of left    • Primary hypertension   • Atrial Fibrillation   • Deep Vein Thrombosis   • Follow-up   :     HPI: Jose Hurtado is a 72 y.o. male  with hypertension, hyperlipidemia, paroxysmal atrial fibrillation, chronic kidney disease, lymphedema chronic PVCs, recurrent DVTs and COPD.  He also has a history of esophageal cancer with prior gastrectomy with gastric pull through in  and chemo/radiation, now in remission.  He has had mild renal insufficiency in the past.      He was previously followed by .  I will visit him for the first time today have reviewed his medical record  He has remote history of left lower extremity DVT and has been anticoagulated.        He was in the hospital in May 2022 with chronic cough, nausea and retching.  He had chest tightness and elevated troponin.  He had acute kidney injury and had left heart catheterization which showed normal coronary arteries.  Echocardiogram showed preserved left ventricular systolic function and dilated left atrium.  He had chronic left pleural effusion.     In 2020 to his PCP increase Lasix from 20mg to 80 mg and his breathing improved however edema stayed about the same.  proBNP outpatient with as high as 3222 improved to 1808 with this change.  However his creatinine went from 1.51-1.72.  I have advised that we decrease his Lasix from 80 mg to 40 mg until he was seen outpatient by nephrology.  Stat referral to nephrology was placed as well as a referral to the lymphedema clinic.  He did not continue to decline and had increased shortness of breath.  He tested positive for RSV and had elevated troponin.  He had A. fib with RVR and was treated with  "breathing treatments and steroids.         He presents today for reassessment.  Breathing is better.  His legs are being wrapped by lymphedema clinic.  He is now on Lasix 40 mg twice daily which was adjusted earlier this week by nephrology.  He has follow-up there next Monday for lab work.  He denies chest pain.  His breathing is overall better.  He is participating in physical therapy twice a week and has a  another 2 days a week.  He is overall doing well.  He denies blood in the urine or stool and is tolerating Eliquis.        No Known Allergies        Family and social history reviewed.     ROS  All other systems were reviewed and are negative          Objective:     Vitals:    01/20/23 1058   BP: 114/56   BP Location: Left arm   Patient Position: Sitting   Pulse: 55   SpO2: 94%   Weight: 99.8 kg (220 lb)   Height: 195.6 cm (77\")     Body mass index is 26.09 kg/m².    PHYSICAL EXAM:  Pulmonary:      Effort: Pulmonary effort is normal.   Cardiovascular:      Normal rate. Regular rhythm.      Comments: Ace wraps BLE L > R        Procedures      Current Outpatient Medications   Medication Sig Dispense Refill   • albuterol sulfate  (90 Base) MCG/ACT inhaler Inhale 1 puff Every 4 (Four) Hours As Needed for Wheezing.     • atorvastatin (LIPITOR) 40 MG tablet Take 1 tablet by mouth Every Night. 90 tablet 3   • cyanocobalamin 1000 MCG/ML injection      • Eliquis 5 MG tablet tablet TAKE ONE TABLET BY MOUTH EVERY 12 HOURS 60 tablet 1   • furosemide (Lasix) 80 MG tablet Take 0.5 tablets by mouth 2 (Two) Times a Day for 30 days.     • Gemtesa 75 MG tablet Daily.     • metoprolol tartrate (LOPRESSOR) 50 MG tablet Take 1 tablet by mouth 2 (Two) Times a Day for 60 days. 60 tablet 1   • pantoprazole (PROTONIX) 40 MG EC tablet TAKE ONE TABLET BY MOUTH TWICE A DAY BEFORE A MEAL 180 tablet 3   • PARoxetine (PAXIL) 40 MG tablet TAKE ONE TABLET BY MOUTH EVERY MORNING 30 tablet 5   • potassium chloride " (MICRO-K) 10 MEQ CR capsule Take 1 capsule by mouth Daily. 90 capsule 5   • pramipexole (MIRAPEX) 1.5 MG tablet TAKE ONE TABLET BY MOUTH TWICE A DAY (Patient taking differently: Take 3 mg by mouth Every Night.) 180 tablet 1   • tiotropium bromide monohydrate (Spiriva Respimat) 2.5 MCG/ACT aerosol solution inhaler Inhale 2 puffs Daily. 4 g 2     No current facility-administered medications for this visit.     Assessment:       Diagnosis Plan   1. Primary hypertension        2. Stage 3b chronic kidney disease (HCC)        3. PVC's (premature ventricular contractions)        4. Hyperlipidemia, unspecified hyperlipidemia type        5. Chronic diastolic congestive heart failure (HCC)             No orders of the defined types were placed in this encounter.        Plan:           1.  72-year-old gentleman with cardiorenal syndrome.  He was recently admitted with respiratory failure, RSV and elevated troponin.  He was discharged on furosemide 40 mg daily.  He is now on 40 mg twice daily per nephrology and has follow-up blood work next week on Monday.  Will defer diuretic management to nephrology at this time  2.  History of left lower extremity DVT on long-term anticoagulation with Eliquis without bleeding issues  3.  Esophageal cancer status postgastrectomy and gastric pull-through in 2015 status post chemotherapy and radiation now in remission  4.  Hypertension blood pressure appears adequately controlled  5.  Hyperlipidemia  6.  COPD  7.  History of ventricular ectopy  8.  Lymphedema-now with bilateral Ace wraps and currently being followed by lymphedema clinic  9.  Normal coronary arteries on cardiac catheterization May 2022    10.  Paroxysmal atrial fibrillation.  Anticoagulated with Eliquis without bleeding issues continue the same    Follow up in 2 months as scheduled               It has been a pleasure to participate in this patient's care.      Thank you,  FAYE Darling      **I used Dragon to dictate this  note:**

## 2023-01-23 ENCOUNTER — HOSPITAL ENCOUNTER (OUTPATIENT)
Dept: PHYSICAL THERAPY | Facility: HOSPITAL | Age: 73
Setting detail: THERAPIES SERIES
Discharge: HOME | End: 2023-01-23
Payer: MEDICARE

## 2023-01-23 DIAGNOSIS — I89.0 LYMPHEDEMA: Primary | ICD-10-CM

## 2023-01-23 PROCEDURE — 97140 MANUAL THERAPY 1/> REGIONS: CPT

## 2023-01-23 NOTE — THERAPY PROGRESS REPORT/RE-CERT
Outpatient Physical Therapy Lymphedema Progress Note  [facility]     Patient Name: Jose Hurtado  : 1950  MRN: [id number]  Today's Date: 2023        Visit Date: 2023    Visit Dx:    ICD-10-CM ICD-9-CM   1. Lymphedema  I89.0 457.1       Patient Active Problem List   Diagnosis   • History of esophageal cancer   • Adenomatous polyp of colon   • Malignant neoplasm of prostate ([facility])   • RLS (restless legs syndrome)   • History of DVT (deep vein thrombosis)   • Recurrent major depressive disorder, in partial remission ([facility])   • Hypertension   • Anemia   • Insomnia due to other mental disorder   • Peripheral polyneuropathy   • B12 deficiency   • Postsurgical malabsorption   • Lymphedema   • Iron deficiency   • Gastroparesis   • Acute deep vein thrombosis (DVT) of popliteal vein of left lower extremity ([facility])   • Tremor of both hands   • Gastrointestinal hemorrhage   • Acute blood loss anemia   • Pancytopenia ([facility])   • Chronic venous hypertension due to DVT   • Chronic obstructive pulmonary disease with acute exacerbation ([facility])   • Bleeding hemorrhoid   • Malabsorption of iron   • Iron deficiency anemia   • Pleuritic chest pain   • History of esophageal cancer   • Abnormal CT scan   • Generalized weakness   • Chronic anticoagulation   • Urinary tract infection due to extended-spectrum beta lactamase (ESBL) producing Escherichia coli   • CKD (chronic kidney disease) stage 2, GFR 60-89 ml/min   • Dysphagia   • PVC's (premature ventricular contractions)   • NSTEMI (non-ST elevated myocardial infarction) ([facility])   • Bronchitis   • Dyspnea   • Elevated troponin   • RSV (acute bronchiolitis due to respiratory syncytial virus)   • Atrial fibrillation ([facility])   • COPD exacerbation ([facility])         Lymphedema     Row Name 23 1200 23 1100          Subjective Pain    Able to rate subjective pain? -- yes  -KA     Pre-Treatment Pain Level -- 0  -KA        Subjective Comments    Subjective Comments -- Pt  "reports \"The bandages stayed on well.  I didn't have time to take them off though.\"  -KA        Lymphedema Measurements    Measurement Type(s) -- Circumferential  -KA     Circumferential Areas -- Lower extremities  -KA        BLE Circumferential (cm)    Measurement Location 1 -- Knee  -KA     Left 1 -- 42.8 cm  -KA     Right 1 -- 40.7 cm  -KA     Measurement Location 2 -- 10cm below knee  -KA     Left 2 -- 39.8 cm  -KA     Right 2 -- 39.2 cm  -KA     Measurement Location 3 -- 20cm below knee  -KA     Left 3 -- 38.8 cm  -KA     Right 3 -- 37.3 cm  -KA     Measurement Location 4 -- 30cm below knee  -KA     Left 4 -- 34.2 cm  -KA     Right 4 -- 32.2 cm  -KA     Measurement Location 5 -- Ankle  -KA     Left 5 -- 29.7 cm  -KA     Right 5 -- 28.2 cm  -KA     Measurement Location 6 -- Midfoot  -KA     Left 6 -- 25.8 cm  -KA     Right 6 -- 25.3 cm  -KA     LLE Circumferential Total -- 211.1 cm  -KA     RLE Circumferential Total -- 202.9 cm  -KA        Manual Lymphatic Drainage    Manual Lymphatic Drainage Comments Short neck, B axilla, B IA, diaphragmatic breathing, L inguinals, LLE  -KA --        Compression/Skin Care    Compression/Skin Care skin care;wrapping location;bandaging;remove bandages  -KA --     Skin Care washed/dried;moisturizing lotion applied  Eucerin  -KA --     Wrapping Location lower extremity  -KA --     Wrapping Location LE bilateral:;foot to knee  -KA --     Bandage Layers cotton liner;padding/fluff layer;short-stretch bandages (comment size/quantity)  -KA --     Bandaging Comments Tg9, Artiflex x 2  right foot to knee, Artiflex x 1 left foot & ankle/Rosidal Soft left ankle to knee, Rosidal K 4-10 cm to each leg, hospital socks, pt's shoes  -KA --     Bandaging Technique circumferential/spiral;moderate compression  -KA --     Remove Bandages Therapist assisted with removal of bandages this visit  -KA --     Compression/Skin Care Comments Fresh bandages applied today  -KA --           User Key  (r) = " Recorded By, (t) = Taken By, (c) = Cosigned By    Initials Name Provider Type    Rubina Amaya, PT Physical Therapist                               PT Assessment/Plan     Row Name 01/23/23 1253          PT Assessment    Functional Limitations Limitation in home management;Performance in leisure activities;Performance in self-care ADL  -KA     Impairments Edema;Gait;Impaired lymphatic circulation;Integumentary integrity;Posture  -     Assessment Comments Pt is responding well to CDT.  At this time, he has met 2/3 STG and 1/3 LTG.  Upon reassessment of girth measurements, pt has seen net decreased edema of 22.5 cm on LLE and 13.3 cm on RLE after 3 treatment sessions which is excellent reduction.  Still has some firmness especiallly in LLE, but softened compared to last visit.  Continued with MLD to more affected LLE as well as compression bandaging.  Pt remains appropriate for skilled physical therapy to reduce edema, reduce infection risk, and improve self management of condition.  -KA     Rehab Potential Good  -     Patient/caregiver participated in establishment of treatment plan and goals Yes  -KA     Patient would benefit from skilled therapy intervention Yes  -KA        PT Plan    PT Frequency 5x/week  -KA     Predicted Duration of Therapy Intervention (PT) 4 weeks  -KA     Planned CPT's? PT RE-EVAL: 80243;PT THER PROC EA 15 MIN: 94703;PT THER ACT EA 15 MIN: 04928;PT MANUAL THERAPY EA 15 MIN: 14916;PT NEUROMUSC RE-EDUCATION EA 15 MIN: 39803;PT GAIT TRAINING EA 15 MIN: 91566;PT SELF CARE/HOME MGMT/TRAIN EA 15: 50353  -     Physical Therapy Interventions (Optional Details) bandaging;home exercise program;manual lymphatic drainage;manual therapy techniques;patient/family education  -     PT Plan Comments Continue CDT.  -           User Key  (r) = Recorded By, (t) = Taken By, (c) = Cosigned By    Initials Name Provider Type    Rubina Amaya, MANDA Physical Therapist                    OP  "Exercises     Row Name 01/23/23 1256 01/23/23 1100          Subjective Comments    Subjective Comments -- Pt reports \"The bandages stayed on well.  I didn't have time to take them off though.\"  -KA        Subjective Pain    Able to rate subjective pain? -- yes  -KA     Pre-Treatment Pain Level -- 0  -KA        Total Minutes    81211 - PT Manual Therapy Minutes 58  -KA --           User Key  (r) = Recorded By, (t) = Taken By, (c) = Cosigned By    Initials Name Provider Type    Rubina Amaya, PT Physical Therapist                        Manual Rx (last 36 hours)     Manual Treatments     Row Name 01/23/23 1256             Total Minutes    73494 - PT Manual Therapy Minutes 58  -KA            User Key  (r) = Recorded By, (t) = Taken By, (c) = Cosigned By    Initials Name Provider Type    Rubina Amaya, PT Physical Therapist                 PT OP Goals     Row Name 01/23/23 1200          PT Short Term Goals    STG Date to Achieve 02/01/23  -KA     STG 1 Pt demo awareness of condition and precautions for improved prevention, management, care of symptoms, and ease of transition to self-care of condition.  -KA     STG 1 Progress Met  -KA     STG 2 Pt demo decreased net edema of >/=5-10cm for decreased edema symptoms, decreased risk of infection, and improved skin care.  -KA     STG 2 Progress Met  -KA     STG 2 Progress Comments Decreased net edema 22.5 cm LLE, 13.3 cm RLE  -KA     STG 3 Patient independent and compliant with home exercise program (as able) focused on range of motion and flexibility to improve lymphatic flow and discomfort.  -KA     STG 3 Progress New  -KA        Long Term Goals    LTG Date to Achieve 02/15/23  -KA     LTG 1 Pt/family independent with self care techniques for self-management of condition.  -KA     LTG 1 Progress New  -KA     LTG 2 Pt demo decreased net edema of >/=10-20cm for decreased edema symptoms, decreased risk of infection, and improved skin care.  -KA     LTG 2 Progress " Met;Ongoing  -DEVONTE     LTG 2 Progress Comments Decreased net edema 22.5 cm LLE, 13.3 cm RLE  -DEVONTE     LTG 3 Pt/family independent with compression garments as indicated for self-management of condition.  -DEVONTE     LTG 3 Progress New  -        Time Calculation    PT Goal Re-Cert Due Date 03/27/23  -DEVONTE           User Key  (r) = Recorded By, (t) = Taken By, (c) = Cosigned By    Initials Name Provider Type    Rubina Amaya, PT Physical Therapist                               Time Calculation:   Start Time: 1130  Stop Time: 1228  Time Calculation (min): 58 min  Timed Charges  09809 - PT Manual Therapy Minutes: 58  Total Minutes  Timed Charges Total Minutes: 58   Total Minutes: 58   Therapy Charges for Today     Code Description Service Date Service Provider Modifiers Qty    58157482583 HC PT MANUAL THERAPY EA 15 MIN 1/23/2023 Rubina Powers, PT GP 4                    Rubina Powers, PT  1/23/2023

## 2023-01-24 ENCOUNTER — HOSPITAL ENCOUNTER (OUTPATIENT)
Dept: PHYSICAL THERAPY | Facility: HOSPITAL | Age: 73
Setting detail: THERAPIES SERIES
Discharge: HOME | End: 2023-01-24
Payer: MEDICARE

## 2023-01-24 DIAGNOSIS — I89.0 LYMPHEDEMA: Primary | ICD-10-CM

## 2023-01-24 PROCEDURE — 97140 MANUAL THERAPY 1/> REGIONS: CPT

## 2023-01-24 NOTE — THERAPY TREATMENT NOTE
Outpatient Physical Therapy Lymphedema Treatment Note  [facility]     Patient Name: Jose Hurtado  : 1950  MRN: [id number]  Today's Date: 2023        Visit Date: 2023    Visit Dx:    ICD-10-CM ICD-9-CM   1. Lymphedema  I89.0 457.1       Patient Active Problem List   Diagnosis   • History of esophageal cancer   • Adenomatous polyp of colon   • Malignant neoplasm of prostate ([facility])   • RLS (restless legs syndrome)   • History of DVT (deep vein thrombosis)   • Recurrent major depressive disorder, in partial remission ([facility])   • Hypertension   • Anemia   • Insomnia due to other mental disorder   • Peripheral polyneuropathy   • B12 deficiency   • Postsurgical malabsorption   • Lymphedema   • Iron deficiency   • Gastroparesis   • Acute deep vein thrombosis (DVT) of popliteal vein of left lower extremity ([facility])   • Tremor of both hands   • Gastrointestinal hemorrhage   • Acute blood loss anemia   • Pancytopenia ([facility])   • Chronic venous hypertension due to DVT   • Chronic obstructive pulmonary disease with acute exacerbation ([facility])   • Bleeding hemorrhoid   • Malabsorption of iron   • Iron deficiency anemia   • Pleuritic chest pain   • History of esophageal cancer   • Abnormal CT scan   • Generalized weakness   • Chronic anticoagulation   • Urinary tract infection due to extended-spectrum beta lactamase (ESBL) producing Escherichia coli   • CKD (chronic kidney disease) stage 2, GFR 60-89 ml/min   • Dysphagia   • PVC's (premature ventricular contractions)   • NSTEMI (non-ST elevated myocardial infarction) ([facility])   • Bronchitis   • Dyspnea   • Elevated troponin   • RSV (acute bronchiolitis due to respiratory syncytial virus)   • Atrial fibrillation ([facility])   • COPD exacerbation ([facility])         Lymphedema     Row Name 23 1200             Subjective Pain    Able to rate subjective pain? yes  -KD      Pre-Treatment Pain Level 0  -KD         Subjective Comments    Subjective Comments States his legs look  really good to him.  -KD         Manual Lymphatic Drainage    Manual Lymphatic Drainage Comments Short neck, B axilla, B IA, diaphragmatic breathing, L inguinals, LLE  -KD         Compression/Skin Care    Compression/Skin Care skin care;wrapping location;bandaging;remove bandages  -KD      Skin Care washed/dried;moisturizing lotion applied  Eucerin  -KD      Wrapping Location lower extremity  -KD      Wrapping Location LE bilateral:;foot to knee  -KD      Bandage Layers cotton liner;padding/fluff layer;short-stretch bandages (comment size/quantity)  -KD      Bandaging Comments Tg9, Artiflex x 2  right foot to knee, Artiflex x 1 left foot & ankle/Rosidal Soft left ankle to knee, Rosidal K 4-10 cm to each leg, hospital socks, pt's shoes  -KD      Bandaging Technique circumferential/spiral;moderate compression  -KD      Remove Bandages Therapist assisted with removal of bandages this visit  -KD            User Key  (r) = Recorded By, (t) = Taken By, (c) = Cosigned By    Initials Name Provider Type    Ceci Esposito, PT Physical Therapist                               PT Assessment/Plan     Row Name 01/24/23 1236          PT Assessment    Assessment Comments Pt's legs appear to have improving contours, decreased edema. He reports much improvement in how they look as well. Started discussing long term daily compression options that might work for him. He struggles to don class 2 stockings, and felt the velcro devices didn't really keep his legs down well. Maybe consider a dual layer stocking system.  -KD        PT Plan    PT Plan Comments Cont CDT, continue looking into LTC options.  -KD           User Key  (r) = Recorded By, (t) = Taken By, (c) = Cosigned By    Initials Name Provider Type    Ceci Esposito, PT Physical Therapist                    OP Exercises     Row Name 01/24/23 1242 01/24/23 1200          Subjective Comments    Subjective Comments -- States his legs look really good to him.  -KD         Subjective Pain    Able to rate subjective pain? -- yes  -KD     Pre-Treatment Pain Level -- 0  -KD        Total Minutes    27552 - PT Manual Therapy Minutes 53  -KD --           User Key  (r) = Recorded By, (t) = Taken By, (c) = Cosigned By    Initials Name Provider Type    Ceci Esposito PT Physical Therapist                        Manual Rx (last 36 hours)     Manual Treatments     Row Name 01/24/23 1242             Total Minutes    13430 - PT Manual Therapy Minutes 53  -KD            User Key  (r) = Recorded By, (t) = Taken By, (c) = Cosigned By    Initials Name Provider Type    Ceci Esposito PT Physical Therapist                    Therapy Education  Education Details: Began disscussion of long term compression options.  Given: Edema management  Program: New, Reinforced  How Provided: Verbal  Provided to: Patient  Level of Understanding: Verbalized              Time Calculation:   Start Time: 1132  Stop Time: 1225  Time Calculation (min): 53 min  Total Timed Code Minutes- PT: 53 minute(s)  Timed Charges  20116 - PT Manual Therapy Minutes: 53  Total Minutes  Timed Charges Total Minutes: 53   Total Minutes: 53   Therapy Charges for Today     Code Description Service Date Service Provider Modifiers Qty    63788721129 HC PT MANUAL THERAPY EA 15 MIN 1/24/2023 Ceci Ruby, PT GP 4                    Ceci Ruby PT  1/24/2023

## 2023-01-25 ENCOUNTER — TREATMENT (OUTPATIENT)
Dept: PHYSICAL THERAPY | Facility: CLINIC | Age: 73
End: 2023-01-25
Payer: MEDICARE

## 2023-01-25 DIAGNOSIS — R29.898 WEAKNESS OF BOTH HIPS: ICD-10-CM

## 2023-01-25 DIAGNOSIS — M25.652 HIP JOINT STIFFNESS, BILATERAL: ICD-10-CM

## 2023-01-25 DIAGNOSIS — M25.651 HIP JOINT STIFFNESS, BILATERAL: ICD-10-CM

## 2023-01-25 DIAGNOSIS — R26.9 GAIT DISTURBANCE: Primary | ICD-10-CM

## 2023-01-25 PROCEDURE — 97110 THERAPEUTIC EXERCISES: CPT | Performed by: PHYSICAL THERAPIST

## 2023-01-25 PROCEDURE — 97140 MANUAL THERAPY 1/> REGIONS: CPT | Performed by: PHYSICAL THERAPIST

## 2023-01-25 NOTE — PROGRESS NOTES
Physical Therapy Daily Treatment Note  [address]  [facility] [address]  [phone]    Patient: Jose Hurtado   : 1950  Referring practitioner: Brandin Bolton MD  Date of Initial Visit: Type: THERAPY  Noted: 2022  Today's Date: 2023  Patient seen for 19 sessions       Visit Diagnoses:    ICD-10-CM ICD-9-CM   1. Gait disturbance  R26.9 781.2   2. Weakness of both hips  R29.898 729.89   3. Hip joint stiffness, bilateral  M25.651 719.55    M25.652        Jose Hurtado reports: The lymphedema therapy has been really helpful. I have one more week to go. I am trying to walk more without my cane. R knee hurts more than L      Objective   Flexed forward at hips with walking  See Exercise, Manual, and Modality Logs for complete treatment.       Assessment/Plan  Able to tolerate prone position but not LINDA. Working on hip flexor tightness and core /back extensor flexibility and strength    Progress per Plan of Care        Timed:         Manual Therapy:    10     mins  56620;     Therapeutic Exercise:    20   mins  69030;     Neuromuscular Wil:        mins  66741;    Therapeutic Activity:          mins  27896;     Gait Training:           mins  91096;     Ultrasound:          mins  12774;    Ionto                                   mins   04630  Self Care                            mins   26292      Un-Timed:  Electrical Stimulation:         mins  93376 ( );  Dry Needling          mins self-pay  Traction          mins 48441  Canalith Repos         mins 07233      Timed Treatment:   30   mins   Total Treatment:     40   mins    Caroline Vasquez PT  Physical Therapist  [place] License # [id number]

## 2023-01-27 ENCOUNTER — HOSPITAL ENCOUNTER (OUTPATIENT)
Dept: PHYSICAL THERAPY | Facility: HOSPITAL | Age: 73
Setting detail: THERAPIES SERIES
Discharge: HOME | End: 2023-01-27
Payer: MEDICARE

## 2023-01-27 DIAGNOSIS — I89.0 LYMPHEDEMA: Primary | ICD-10-CM

## 2023-01-27 PROCEDURE — 97535 SELF CARE MNGMENT TRAINING: CPT

## 2023-01-27 NOTE — THERAPY TREATMENT NOTE
"    Outpatient Physical Therapy Lymphedema Treatment Note  [facility]     Patient Name: Jose Hurtado  : 1950  MRN: [id number]  Today's Date: 2023        Visit Date: 2023    Visit Dx:    ICD-10-CM ICD-9-CM   1. Lymphedema  I89.0 457.1       Patient Active Problem List   Diagnosis   • History of esophageal cancer   • Adenomatous polyp of colon   • Malignant neoplasm of prostate ([facility])   • RLS (restless legs syndrome)   • History of DVT (deep vein thrombosis)   • Recurrent major depressive disorder, in partial remission ([facility])   • Hypertension   • Anemia   • Insomnia due to other mental disorder   • Peripheral polyneuropathy   • B12 deficiency   • Postsurgical malabsorption   • Lymphedema   • Iron deficiency   • Gastroparesis   • Acute deep vein thrombosis (DVT) of popliteal vein of left lower extremity ([facility])   • Tremor of both hands   • Gastrointestinal hemorrhage   • Acute blood loss anemia   • Pancytopenia ([facility])   • Chronic venous hypertension due to DVT   • Chronic obstructive pulmonary disease with acute exacerbation ([facility])   • Bleeding hemorrhoid   • Malabsorption of iron   • Iron deficiency anemia   • Pleuritic chest pain   • History of esophageal cancer   • Abnormal CT scan   • Generalized weakness   • Chronic anticoagulation   • Urinary tract infection due to extended-spectrum beta lactamase (ESBL) producing Escherichia coli   • CKD (chronic kidney disease) stage 2, GFR 60-89 ml/min   • Dysphagia   • PVC's (premature ventricular contractions)   • NSTEMI (non-ST elevated myocardial infarction) ([facility])   • Bronchitis   • Dyspnea   • Elevated troponin   • RSV (acute bronchiolitis due to respiratory syncytial virus)   • Atrial fibrillation ([facility])   • COPD exacerbation ([facility])         Lymphedema     Row Name 23 1200             Subjective Pain    Able to rate subjective pain? yes  -KA      Pre-Treatment Pain Level 0  -KA         Subjective Comments    Subjective Comments Pt reports \"I brought " "in all my garments from last time.  I really don't like the velcro, how they fit or the bulk.  I have these other stockings though but I don't know how to put them on.  I think they're from last time.\"  -KA         Skin Changes/Observations    Skin Observations Comment Light redness B anterior shins  -KA         Compression/Skin Care    Compression/Skin Care compression garment  -KA      Compression Garment Comments Pt brought 2 velcro devices (1 Juxtalite HD and 1 Juzo) as well as [employer] Dual Layer system 30-40 mmHg in size XL.  -DEVONTE            User Key  (r) = Recorded By, (t) = Taken By, (c) = Cosigned By    Initials Name Provider Type    Rubina Amaya, PT Physical Therapist                               PT Assessment/Plan     Row Name 01/27/23 1313          PT Assessment    Assessment Comments Pt brought in long term compression garments for therapist to assess.  Already has Dual Layer compression system from [employer] that has only been worn once or twice because pt was unsure about how to use, size XL is appropriate considering girth measurements taken earlier this week.  Stocking system fits appropriately, pt trained in donning and doffing today and will trial this compression system over the weekend to ensure it provides adequate containment.  Pt able to don/doff garments independently by end of today's session.  -KA        PT Plan    PT Plan Comments Reassess girth measurements, assess response to dual layer system.  -DEVONTE           User Key  (r) = Recorded By, (t) = Taken By, (c) = Cosigned By    Initials Name Provider Type    Rubina Amaya, PT Physical Therapist                    OP Exercises     Row Name 01/27/23 1200             Subjective Comments    Subjective Comments Pt reports \"I brought in all my garments from last time.  I really don't like the velcro, how they fit or the bulk.  I have these other stockings though but I don't know how to put them on.  I think they're from last time.\"  -KA   "       Subjective Pain    Able to rate subjective pain? yes  -KA      Pre-Treatment Pain Level 0  -KA            User Key  (r) = Recorded By, (t) = Taken By, (c) = Cosigned By    Initials Name Provider Type    Rubina Amaya, PT Physical Therapist                                Therapy Education  Education Details: Assessed fit of compression stockings (Dual layer) that pt found at home, has been minimally worn.  Instructed in proper application with and without slippie.  Pt instructed that he will need to remove compression garments at night, apply lotion, and then reapply each morning.  Will trial dual layer stocking system over the weekend to see if it provides adequate containment.  Given: Edema management  Program: New  How Provided: Verbal, Demonstration  Provided to: Patient  Level of Understanding: Verbalized, Demonstrated, Teach back education performed  86180 - PT Self Care/Mgmt Minutes: 40              Time Calculation:   Start Time: 1140  Stop Time: 1223  Time Calculation (min): 43 min  Timed Charges  72902 - PT Self Care/Mgmt Minutes: 40  Total Minutes  Timed Charges Total Minutes: 40   Total Minutes: 40   Therapy Charges for Today     Code Description Service Date Service Provider Modifiers Qty    98793077015  PT SELF CARE/MGMT/TRAIN EA 15 MIN 1/27/2023 Rubina Powers, PT GP 3                    Rubina Powers PT  1/27/2023

## 2023-01-30 ENCOUNTER — HOSPITAL ENCOUNTER (OUTPATIENT)
Dept: PHYSICAL THERAPY | Facility: HOSPITAL | Age: 73
Setting detail: THERAPIES SERIES
Discharge: HOME | End: 2023-01-30
Payer: MEDICARE

## 2023-01-30 DIAGNOSIS — I89.0 LYMPHEDEMA: Primary | ICD-10-CM

## 2023-01-30 PROCEDURE — 97535 SELF CARE MNGMENT TRAINING: CPT

## 2023-01-30 NOTE — THERAPY DISCHARGE NOTE
Outpatient Physical Therapy Lymphedema Progress Note/Discharge Summary  [facility]     Patient Name: Jose Hurtado  : 1950  MRN: [id number]  Today's Date: 2023      Visit Date: 2023    Visit Dx:    ICD-10-CM ICD-9-CM   1. Lymphedema  I89.0 457.1       Patient Active Problem List   Diagnosis   • History of esophageal cancer   • Adenomatous polyp of colon   • Malignant neoplasm of prostate ([facility])   • RLS (restless legs syndrome)   • History of DVT (deep vein thrombosis)   • Recurrent major depressive disorder, in partial remission ([facility])   • Hypertension   • Anemia   • Insomnia due to other mental disorder   • Peripheral polyneuropathy   • B12 deficiency   • Postsurgical malabsorption   • Lymphedema   • Iron deficiency   • Gastroparesis   • Acute deep vein thrombosis (DVT) of popliteal vein of left lower extremity ([facility])   • Tremor of both hands   • Gastrointestinal hemorrhage   • Acute blood loss anemia   • Pancytopenia ([facility])   • Chronic venous hypertension due to DVT   • Chronic obstructive pulmonary disease with acute exacerbation ([facility])   • Bleeding hemorrhoid   • Malabsorption of iron   • Iron deficiency anemia   • Pleuritic chest pain   • History of esophageal cancer   • Abnormal CT scan   • Generalized weakness   • Chronic anticoagulation   • Urinary tract infection due to extended-spectrum beta lactamase (ESBL) producing Escherichia coli   • CKD (chronic kidney disease) stage 2, GFR 60-89 ml/min   • Dysphagia   • PVC's (premature ventricular contractions)   • NSTEMI (non-ST elevated myocardial infarction) ([facility])   • Bronchitis   • Dyspnea   • Elevated troponin   • RSV (acute bronchiolitis due to respiratory syncytial virus)   • Atrial fibrillation ([facility])   • COPD exacerbation ([facility])         Lymphedema     Row Name 23 1100             Subjective Pain    Able to rate subjective pain? yes  -KA      Pre-Treatment Pain Level 0  -KA         Subjective Comments    Subjective Comments Pt  "reports \"I did well with the stockings.  I think they kept me down well too.\"  -KA         Lymphedema Assessment    Lymphedema Classification RLE:;LLE:;stage 2 (Spontaneously Irreversible)  -KA      Infections or Cellulitis? no  -KA         LLIS - Physical Concerns    The amount of pain associated with my lymphedema is: 0  -KA      The amount of limb heaviness associated with my lymphedema is: 1  -KA      The amount of skin tightness associated with my lymphedema is: 2  -KA      The size of my swollen limb(s) seems: 1  -KA      Lymphedema affects the movement of my swollen limb(s): 0  -KA      The strength in my swollen limb(s) is: 1  -KA         LLIS - Psychosocial Concerns    Lymphedema affects my body image (i.e., \"how I think I look\"). 0  -KA      Lymphedema affects my socializing with others. 0  -KA      Lymphedema affects my intimate relations with spouse or partner (rate 0 if not applicable 0  -KA      Lymphedema \"gets me down\" (i.e., depression, frustration, or anger) 0  -KA      I must rely on others for help due to my lymphedema. 1  -KA      I know what to do to manage my lymphedema 0  -KA         LLIS - Functional Concerns    Lymphedema affects my ability to perform self-care activities (i.e. eating, dressing, hygiene) 1  -KA      Lymphedema affects my ability to perform routine home or work-related activities. 1  -KA      Lymphedema affects my performance of preferred leisure activities. 1  -KA      Lymphedema affects proper fit of clothing/shoes 1  -KA      Lymphedema affects my sleep 1  -KA         Posture/Observations    Posture/Observations Comments Ambulates slowly, using SC, extremely flexed posture, severe genu valgus  -KA         General ROM    GENERAL ROM COMMENTS Gen BLE WFLs  -KA         Lymphedema Edema Assessment    Coffey Hump bilateral:;negative  -KA      Edema Assessment Comment Mild edema in LLE>RLE, no pitting today, leg contours significantly improved  -KA         Lymphedema " Measurements    Measurement Type(s) Circumferential  -KA      Circumferential Areas Lower extremities  -KA         BLE Circumferential (cm)    Measurement Location 1 Knee  -KA      Left 1 40.5 cm  -KA      Right 1 38.5 cm  -KA      Measurement Location 2 10cm below knee  -KA      Left 2 38.7 cm  -KA      Right 2 39.1 cm  -KA      Measurement Location 3 20cm below knee  -KA      Left 3 40.8 cm  -KA      Right 3 37.8 cm  -KA      Measurement Location 4 30cm below knee  -KA      Left 4 33.5 cm  -KA      Right 4 31.3 cm  -KA      Measurement Location 5 Ankle  -KA      Left 5 29.3 cm  -KA      Right 5 27.9 cm  -KA      Measurement Location 6 Midfoot  -KA      Left 6 26.8 cm  -KA      Right 6 25.8 cm  -KA      LLE Circumferential Total 209.6 cm  -KA      RLE Circumferential Total 200.4 cm  -KA         Manual Lymphatic Drainage    Manual Lymphatic Drainage Comments No MLD today  -KA         Compression/Skin Care    Compression/Skin Care compression garment  -KA      Compression Garment Comments Pt doffed and donned [employer] Dual Layer system 30-40 mmHg in size XL  -KA         Lymphedema Life Impact Scale Totals    A.  Total Q1 - Q17 (Do not include Q18) 11  -KA      B.  Total number of questions answered (Q1-Q17) 17  -KA      C. Divide A by B 0.65  -KA      D. Multiple C by 25 16.25  -KA            User Key  (r) = Recorded By, (t) = Taken By, (c) = Cosigned By    Initials Name Provider Type    Rubina Amaya, PT Physical Therapist                               PT Assessment/Plan     Row Name 01/30/23 1206 01/30/23 1201       PT Assessment    Functional Limitations -- Limitation in home management;Performance in leisure activities;Performance in self-care ADL  -KA    Impairments -- Edema;Gait;Impaired lymphatic circulation;Integumentary integrity;Posture  -KA    Assessment Comments Pt has responded well to CDT.  At this time, he has met all functional goals except compliance with decongestive exercise, reviewed today,  "pt familiar with all exercises from previous episodes of care and just needs to add exercises into daily routine.  After 7 treatment sessions, he has seen net decreased edema of 24 cm on LLE and 15.8 cm on RLE; total girth measurements were 209.6 cm on LLE and 200.4 cm on RLE with more affected LLE significantly smaller than at discharge during last episode of care in 2022.  Score on Lymphedema Life Impact scale decreased from 39.75 to 16.25 with treatment indicating reduced disability related to lymphedema.  Pt is compliant with use of Mediven Dual Layer compression system, size XL 30-40 mmHg and is able to don/doff independently.  Pt to discharge to independent self management of condition at this time, pt to return to lymphedema clinic if/when swelling gets out of hand again.  -KA --       PT Plan    PT Plan Comments Discharge to self management, return for reassessment if/when indicated.  -KA --          User Key  (r) = Recorded By, (t) = Taken By, (c) = Cosigned By    Initials Name Provider Type    Rubina Amaya, PT Physical Therapist                      OP Exercises     Row Name 01/30/23 1100             Subjective Comments    Subjective Comments Pt reports \"I did well with the stockings.  I think they kept me down well too.\"  -DEVONTE         Subjective Pain    Able to rate subjective pain? yes  -KA      Pre-Treatment Pain Level 0  -KA            User Key  (r) = Recorded By, (t) = Taken By, (c) = Cosigned By    Initials Name Provider Type    Rubina Amaya, PT Physical Therapist                               PT OP Goals     Row Name 01/30/23 1100          PT Short Term Goals    STG Date to Achieve 02/01/23  -KA     STG 1 Pt demo awareness of condition and precautions for improved prevention, management, care of symptoms, and ease of transition to self-care of condition.  -KA     STG 1 Progress Met  -KA     STG 2 Pt demo decreased net edema of >/=5-10cm for decreased edema symptoms, decreased risk of " infection, and improved skin care.  -     STG 2 Progress Met  -     STG 3 Patient independent and compliant with home exercise program (as able) focused on range of motion and flexibility to improve lymphatic flow and discomfort.  -     STG 3 Progress Ongoing  -     STG 3 Progress Comments Pt is familiar with exercises from previous episodes of care, has not begun performing daily as just instructed today  -        Long Term Goals    LTG Date to Achieve 02/15/23  -     LTG 1 Pt/family independent with self care techniques for self-management of condition.  -     LTG 1 Progress Met  -     LTG 2 Pt demo decreased net edema of >/=10-20cm for decreased edema symptoms, decreased risk of infection, and improved skin care.  -     LTG 2 Progress Met  -     LTG 3 Pt/family independent with compression garments as indicated for self-management of condition.  -     LTG 3 Progress Met  -           User Key  (r) = Recorded By, (t) = Taken By, (c) = Cosigned By    Initials Name Provider Type    Rubina Amaya, PT Physical Therapist                Therapy Education  Education Details: Reviewed decongestive exercise, pt is familiar with these from previous episodes of treatment.  Discussed girth measurements, [employer] Dual Layer stockings 30-40 mmHG demonstrated adequate containment as girth measurements were actually slightly lower than when previously measured straight from removing bandaging.  Provided information for pt to reorder additional pair of stockings and encouraged him to return to lymphedema clinic as needed if/when swelling gets out of control.  Given: HEP, Edema management  Program: New, Reinforced  How Provided: Verbal  Provided to: Patient  Level of Understanding: Verbalized  67774 - PT Self Care/Mgmt Minutes: 26              Time Calculation:   Start Time: 1127  Stop Time: 1153  Time Calculation (min): 26 min  Timed Charges  79766 - PT Self Care/Mgmt Minutes: 26  Total Minutes  Timed  Charges Total Minutes: 26   Total Minutes: 26     Therapy Charges for Today     Code Description Service Date Service Provider Modifiers Qty    25662902731 HC PT SELF CARE/MGMT/TRAIN EA 15 MIN 1/30/2023 Rubina Powers, PT GP 2                 OP PT Discharge Summary  Date of Discharge: 01/30/23  Reason for Discharge: Maximum functional potential achieved, Independent, At baseline function  Outcomes Achieved: Patient able to partially acheive established goals  Discharge Destination: Home with home program  Discharge Instructions/Additional Comments: Wear [employer] Dual Layer system 30-40 mmHG daily, apply first thing in the morning, remove at night. Use lymphedema pump 1-2x/day.  Perform decongestive exercises daily.  Replace compression garments every 6 months.  Return to lymphedema clinic if he experiences flare up in swelling which lasts longer than one week with daily use of compression garments or bandaging.      Rubina Powers, PT  1/30/2023

## 2023-01-31 ENCOUNTER — APPOINTMENT (OUTPATIENT)
Dept: PHYSICAL THERAPY | Facility: HOSPITAL | Age: 73
End: 2023-01-31
Payer: MEDICARE

## 2023-02-01 ENCOUNTER — TELEPHONE (OUTPATIENT)
Dept: PHYSICAL THERAPY | Facility: CLINIC | Age: 73
End: 2023-02-01

## 2023-02-01 ENCOUNTER — APPOINTMENT (OUTPATIENT)
Dept: PHYSICAL THERAPY | Facility: HOSPITAL | Age: 73
End: 2023-02-01
Payer: MEDICARE

## 2023-02-02 ENCOUNTER — TRANSCRIBE ORDERS (OUTPATIENT)
Dept: ADMINISTRATIVE | Facility: HOSPITAL | Age: 73
End: 2023-02-02
Payer: MEDICARE

## 2023-02-02 ENCOUNTER — APPOINTMENT (OUTPATIENT)
Dept: PHYSICAL THERAPY | Facility: HOSPITAL | Age: 73
End: 2023-02-02
Payer: MEDICARE

## 2023-02-02 DIAGNOSIS — R91.8 PULMONARY INFILTRATE: Primary | ICD-10-CM

## 2023-02-02 DIAGNOSIS — K21.00 PEPTIC REFLUX ESOPHAGITIS: ICD-10-CM

## 2023-02-03 ENCOUNTER — TELEPHONE (OUTPATIENT)
Dept: PHYSICAL THERAPY | Facility: CLINIC | Age: 73
End: 2023-02-03

## 2023-02-03 ENCOUNTER — APPOINTMENT (OUTPATIENT)
Dept: PHYSICAL THERAPY | Facility: HOSPITAL | Age: 73
End: 2023-02-03
Payer: MEDICARE

## 2023-02-06 ENCOUNTER — APPOINTMENT (OUTPATIENT)
Dept: PHYSICAL THERAPY | Facility: HOSPITAL | Age: 73
End: 2023-02-06
Payer: MEDICARE

## 2023-02-07 ENCOUNTER — APPOINTMENT (OUTPATIENT)
Dept: PHYSICAL THERAPY | Facility: HOSPITAL | Age: 73
End: 2023-02-07
Payer: MEDICARE

## 2023-02-08 ENCOUNTER — TREATMENT (OUTPATIENT)
Dept: PHYSICAL THERAPY | Facility: CLINIC | Age: 73
End: 2023-02-08
Payer: MEDICARE

## 2023-02-08 ENCOUNTER — APPOINTMENT (OUTPATIENT)
Dept: PHYSICAL THERAPY | Facility: HOSPITAL | Age: 73
End: 2023-02-08
Payer: MEDICARE

## 2023-02-08 DIAGNOSIS — M25.652 HIP JOINT STIFFNESS, BILATERAL: ICD-10-CM

## 2023-02-08 DIAGNOSIS — R26.9 GAIT DISTURBANCE: Primary | ICD-10-CM

## 2023-02-08 DIAGNOSIS — M25.651 HIP JOINT STIFFNESS, BILATERAL: ICD-10-CM

## 2023-02-08 DIAGNOSIS — R13.10 DYSPHAGIA, UNSPECIFIED TYPE: Primary | ICD-10-CM

## 2023-02-08 DIAGNOSIS — M25.661 KNEE JOINT STIFFNESS, BILATERAL: ICD-10-CM

## 2023-02-08 DIAGNOSIS — R29.898 WEAKNESS OF BOTH HIPS: ICD-10-CM

## 2023-02-08 DIAGNOSIS — M25.662 KNEE JOINT STIFFNESS, BILATERAL: ICD-10-CM

## 2023-02-08 PROCEDURE — 97110 THERAPEUTIC EXERCISES: CPT | Performed by: PHYSICAL THERAPIST

## 2023-02-08 PROCEDURE — 97140 MANUAL THERAPY 1/> REGIONS: CPT | Performed by: PHYSICAL THERAPIST

## 2023-02-08 NOTE — PROGRESS NOTES
Physical Therapy Daily Treatment Note  [address]  [facility] [address]  [phone]    Patient: Jose Hurtado   : 1950  Referring practitioner: Brandin Bolton MD  Date of Initial Visit: Type: THERAPY  Noted: 2022  Today's Date: 2023  Patient seen for 20 sessions       Visit Diagnoses:    ICD-10-CM ICD-9-CM   1. Gait disturbance  R26.9 781.2   2. Weakness of both hips  R29.898 729.89   3. Hip joint stiffness, bilateral  M25.651 719.55    M25.652    4. Knee joint stiffness, bilateral  M25.661 719.56    M25.662        Jose Hurtado reports: I feel like most recent therapy visits have been helping. Lyphedema has improved as well      Objective   See Exercise, Manual, and Modality Logs for complete treatment.       Assessment/Plan  Very tight hip flexors but feels better after stretching. Unable to keep pelvis on table in LINDA position    Progress per Plan of Care        Timed:         Manual Therapy:    10     mins  63762;     Therapeutic Exercise:    20     mins  12057;     Neuromuscular Wil:        mins  52636;    Therapeutic Activity:          mins  56538;     Gait Training:           mins  22704;     Ultrasound:          mins  23892;    Ionto                                   mins   33845  Self Care                            mins   74073      Un-Timed:  Electrical Stimulation:         mins  95405 ( );  Dry Needling          mins self-pay  Traction          mins 17135  Canalith Repos         mins 01980      Timed Treatment:   30   mins   Total Treatment:     30   mins    Caroline Vasquez PT  Physical Therapist  [place] License # [id number]

## 2023-02-10 ENCOUNTER — OFFICE VISIT (OUTPATIENT)
Dept: INTERNAL MEDICINE | Age: 73
End: 2023-02-10
Payer: MEDICARE

## 2023-02-10 ENCOUNTER — TRANSCRIBE ORDERS (OUTPATIENT)
Dept: SPEECH THERAPY | Facility: HOSPITAL | Age: 73
End: 2023-02-10
Payer: MEDICARE

## 2023-02-10 VITALS
DIASTOLIC BLOOD PRESSURE: 60 MMHG | BODY MASS INDEX: 23.73 KG/M2 | TEMPERATURE: 97.2 F | WEIGHT: 201 LBS | SYSTOLIC BLOOD PRESSURE: 120 MMHG | OXYGEN SATURATION: 98 % | HEIGHT: 77 IN | HEART RATE: 72 BPM

## 2023-02-10 DIAGNOSIS — N18.2 CKD (CHRONIC KIDNEY DISEASE) STAGE 2, GFR 60-89 ML/MIN: ICD-10-CM

## 2023-02-10 DIAGNOSIS — J69.0 ASPIRATION PNEUMONITIS: Primary | ICD-10-CM

## 2023-02-10 DIAGNOSIS — I50.9 CONGESTIVE HEART FAILURE, UNSPECIFIED HF CHRONICITY, UNSPECIFIED HEART FAILURE TYPE: Primary | ICD-10-CM

## 2023-02-10 DIAGNOSIS — J44.9 CHRONIC OBSTRUCTIVE PULMONARY DISEASE, UNSPECIFIED COPD TYPE: ICD-10-CM

## 2023-02-10 PROBLEM — Z16.12 URINARY TRACT INFECTION DUE TO EXTENDED-SPECTRUM BETA LACTAMASE (ESBL) PRODUCING ESCHERICHIA COLI: Status: RESOLVED | Noted: 2021-05-11 | Resolved: 2023-02-10

## 2023-02-10 PROBLEM — N39.0 URINARY TRACT INFECTION DUE TO EXTENDED-SPECTRUM BETA LACTAMASE (ESBL) PRODUCING ESCHERICHIA COLI: Status: RESOLVED | Noted: 2021-05-11 | Resolved: 2023-02-10

## 2023-02-10 PROBLEM — I49.3 PVC'S (PREMATURE VENTRICULAR CONTRACTIONS): Status: RESOLVED | Noted: 2022-03-16 | Resolved: 2023-02-10

## 2023-02-10 PROBLEM — K91.2 POSTSURGICAL MALABSORPTION: Status: RESOLVED | Noted: 2019-10-25 | Resolved: 2023-02-10

## 2023-02-10 PROBLEM — R07.81 PLEURITIC CHEST PAIN: Status: RESOLVED | Noted: 2021-05-11 | Resolved: 2023-02-10

## 2023-02-10 PROBLEM — J21.0 RSV (ACUTE BRONCHIOLITIS DUE TO RESPIRATORY SYNCYTIAL VIRUS): Status: RESOLVED | Noted: 2023-01-03 | Resolved: 2023-02-10

## 2023-02-10 PROBLEM — J44.1 CHRONIC OBSTRUCTIVE PULMONARY DISEASE WITH ACUTE EXACERBATION (HCC): Chronic | Status: ACTIVE | Noted: 2020-12-16

## 2023-02-10 PROBLEM — B96.29 URINARY TRACT INFECTION DUE TO EXTENDED-SPECTRUM BETA LACTAMASE (ESBL) PRODUCING ESCHERICHIA COLI: Status: RESOLVED | Noted: 2021-05-11 | Resolved: 2023-02-10

## 2023-02-10 PROBLEM — J44.1 CHRONIC OBSTRUCTIVE PULMONARY DISEASE WITH ACUTE EXACERBATION (HCC): Chronic | Status: RESOLVED | Noted: 2020-12-16 | Resolved: 2023-02-10

## 2023-02-10 PROCEDURE — 99214 OFFICE O/P EST MOD 30 MIN: CPT | Performed by: INTERNAL MEDICINE

## 2023-02-10 RX ORDER — FUROSEMIDE 40 MG/1
40 TABLET ORAL 2 TIMES DAILY
COMMUNITY
Start: 2023-02-10

## 2023-02-10 NOTE — PROGRESS NOTES
"    I N T E R N A L  M E D I C I N E    J U N O H  K I M,  M D      ENCOUNTER DATE:  02/10/2023    Jose FITCH Hurtado / 73 y.o. / male    CHIEF COMPLAINT / REASON FOR OFFICE VISIT     COPD and Dyspnea on exertion       ASSESSMENT & PLAN     Problem List Items Addressed This Visit        High    Chronic obstructive pulmonary disease (HCC) (Chronic)    Current Assessment & Plan     He has been evaluated by pulmonologist.  Continue Spiriva 2 puffs daily.  Continue follow-up with pulmonologist as advised.           Relevant Medications    albuterol sulfate  (90 Base) MCG/ACT inhaler    tiotropium bromide monohydrate (Spiriva Respimat) 2.5 MCG/ACT aerosol solution inhaler    Congestive heart failure (HCC) - Primary (Chronic)    Current Assessment & Plan     Continue furosemide 40 mg twice daily.         Relevant Medications    metoprolol tartrate (LOPRESSOR) 50 MG tablet    furosemide (Lasix) 40 MG tablet       Medium    CKD (chronic kidney disease) stage 2, GFR 60-89 ml/min (Chronic)    Current Assessment & Plan     Continue follow-up with nephrologist.  He is on furosemide 40 mg twice daily which is being managed by his nephrologist.         Relevant Medications    furosemide (Lasix) 40 MG tablet     No orders of the defined types were placed in this encounter.    No orders of the defined types were placed in this encounter.      SUMMARY/DISCUSSION  •       Next Appointment with me: 6/20/2023    Return in about 4 months (around 6/10/2023) for Reassess chronic medical problems, Schedule AWV with APRN for THIS YEAR.      VITAL SIGNS     Vitals:    02/10/23 1351   BP: 120/60   Pulse: 72   Temp: 97.2 °F (36.2 °C)   SpO2: 98%   Weight: 91.2 kg (201 lb)   Height: 195.6 cm (77\")       BP Readings from Last 3 Encounters:   02/10/23 120/60   01/20/23 114/56   01/04/23 130/64     Wt Readings from Last 3 Encounters:   02/10/23 91.2 kg (201 lb)   01/20/23 99.8 kg (220 lb)   01/03/23 95.4 kg (210 lb 6.4 oz)     Body mass index " is 23.84 kg/m².    Blood pressure readings recorded on patient flowsheet:  No flowsheet data found.       MEDICATIONS AT THE TIME OF OFFICE VISIT     Current Outpatient Medications on File Prior to Visit   Medication Sig   • albuterol sulfate  (90 Base) MCG/ACT inhaler Inhale 1 puff Every 4 (Four) Hours As Needed for Wheezing.   • atorvastatin (LIPITOR) 40 MG tablet Take 1 tablet by mouth Every Night.   • cyanocobalamin 1000 MCG/ML injection    • Eliquis 5 MG tablet tablet TAKE ONE TABLET BY MOUTH EVERY 12 HOURS   • furosemide (Lasix) 40 MG tablet Take 1 tablet by mouth 2 (Two) Times a Day.   • Gemtesa 75 MG tablet Daily.   • metoprolol tartrate (LOPRESSOR) 50 MG tablet Take 1 tablet by mouth 2 (Two) Times a Day for 60 days.   • pantoprazole (PROTONIX) 40 MG EC tablet TAKE ONE TABLET BY MOUTH TWICE A DAY BEFORE A MEAL   • PARoxetine (PAXIL) 40 MG tablet TAKE ONE TABLET BY MOUTH EVERY MORNING   • potassium chloride (MICRO-K) 10 MEQ CR capsule Take 1 capsule by mouth Daily.   • pramipexole (MIRAPEX) 1.5 MG tablet TAKE ONE TABLET BY MOUTH TWICE A DAY (Patient taking differently: Take 3 mg by mouth Every Night.)   • tiotropium bromide monohydrate (Spiriva Respimat) 2.5 MCG/ACT aerosol solution inhaler Inhale 2 puffs Daily.   • [DISCONTINUED] furosemide (Lasix) 80 MG tablet Take 0.5 tablets by mouth 2 (Two) Times a Day for 30 days.     No current facility-administered medications on file prior to visit.          HISTORY OF PRESENT ILLNESS     He has been evaluated by his cardiologist, pulmonologist and nephrologist.  He is presently taking Lasix 40 mg twice daily most recently ordered by his nephrologist.  He is on Spiriva inhaler 2 puffs daily for COPD.  Patient denies improvement overall in his breathing status.  He sees the lymphedema clinic for chronic edema of the lower extremities.  Most recent CT angiogram of the chest on January 3, 2023 showed no evidence of pulmonary embolism.  There was evidence of  slightly worse bibasilar atelectasis and possibility for aspiration pneumonitis.  Patient states that he was treated with a course of antibiotic by his pulmonologist.      Patient Care Team:  Brandin Bolton MD as PCP - General (Internal Medicine)  George Phelan II, MD as Consulting Physician (Hematology and Oncology)  Jose Inman MD as Consulting Physician (Urology)  Mulugeta Millan MD as Consulting Physician (Gastroenterology)  Jose Christine III, MD as Referring Physician (Thoracic Surgery)  Seth Randhawa MD as Consulting Physician (Ophthalmology)  James Cyr MD as Consulting Physician (Cardiology)    REVIEW OF SYSTEMS     Review of Systems   Denies chest pain or worsening shortness of breath   GI negative for melena or bleeding     PHYSICAL EXAMINATION     Physical Exam  General: No acute distress  Psych: Normal thought and judgment   Cardiovascular Rate: normal. Rhythm: irregular.   Pulm/Chest: Effort normal, breath sounds normal.   Legs are in compressions/wraps     REVIEWED DATA     Labs:     Lab Results   Component Value Date     01/04/2023    K 4.5 01/04/2023    CALCIUM 8.0 (L) 01/04/2023    AST 25 01/02/2023    ALT 18 01/02/2023    BUN 22 01/04/2023    CREATININE 1.44 (H) 01/04/2023    CREATININE 1.23 01/03/2023    CREATININE 1.47 (H) 01/02/2023    EGFRIFNONA 62 06/04/2021    EGFRIFAFRI 75 06/04/2021       Lab Results   Component Value Date    HGBA1C 5.50 08/06/2021    HGBA1C 6.20 (H) 01/17/2020       Lab Results   Component Value Date    LDL 98 06/02/2022    LDL 92 05/10/2018     (H) 01/30/2018    HDL 60 06/02/2022    HDL 45 05/10/2018    TRIG 53 06/02/2022    TRIG 79 05/10/2018       Lab Results   Component Value Date    TSH 2.710 01/17/2020    TSH 2.050 06/06/2019    TSH 3.440 02/13/2019    FREET4 1.52 01/17/2020    FREET4 1.18 06/06/2019    FREET4 1.33 02/13/2019       Lab Results   Component Value Date    WBC 2.41 (L) 01/04/2023    HGB 10.2 (L) 01/04/2023      (L) 01/04/2023       No results found for: MALBCRERATIO       Imaging:     CT Angiogram Chest (01/03/2023 00:35)     No evidence of pulmonary emboli with chronic postoperative changes in   the mediastinum and right hemithorax showing slightly worse bibasilar   atelectasis in the interval.  Query aspiration pneumonitis.       Medical Tests:     Adult Transthoracic Echo Complete w/ Color, Spectral and Contrast if necessary per protocol (05/13/2022 11:47)    • Estimated right ventricular systolic pressure from tricuspid regurgitation is mildly elevated (35-45 mmHg). Calculated right ventricular systolic pressure from tricuspid regurgitation is 43 mmHg.  • Left ventricular wall thickness is consistent with mild concentric hypertrophy.  • Estimated left ventricular EF = 56% Left ventricular systolic function is normal.  • Left ventricular diastolic function was normal.         Summary of old records / correspondence / consultant report:           Request outside records:             *Examiner was wearing [id number] mask and eye protection during the entire duration of the visit. Patient was masked the entire time. Minimum social distance of 6 ft maintained entire visit except if physical contact was necessary as documented.       Template created by Moose Bolton MD

## 2023-02-10 NOTE — ASSESSMENT & PLAN NOTE
Continue follow-up with nephrologist.  He is on furosemide 40 mg twice daily which is being managed by his nephrologist.

## 2023-02-10 NOTE — ASSESSMENT & PLAN NOTE
He has been evaluated by pulmonologist.  Continue Spiriva 2 puffs daily.  Continue follow-up with pulmonologist as advised.

## 2023-02-15 ENCOUNTER — LAB (OUTPATIENT)
Dept: LAB | Facility: HOSPITAL | Age: 73
End: 2023-02-15
Payer: MEDICARE

## 2023-02-15 ENCOUNTER — OFFICE VISIT (OUTPATIENT)
Dept: ONCOLOGY | Facility: CLINIC | Age: 73
End: 2023-02-15
Payer: MEDICARE

## 2023-02-15 ENCOUNTER — TREATMENT (OUTPATIENT)
Dept: PHYSICAL THERAPY | Facility: CLINIC | Age: 73
End: 2023-02-15
Payer: MEDICARE

## 2023-02-15 VITALS
HEIGHT: 77 IN | BODY MASS INDEX: 24.34 KG/M2 | RESPIRATION RATE: 18 BRPM | OXYGEN SATURATION: 97 % | TEMPERATURE: 97.6 F | HEART RATE: 64 BPM | DIASTOLIC BLOOD PRESSURE: 56 MMHG | WEIGHT: 206.1 LBS | SYSTOLIC BLOOD PRESSURE: 122 MMHG

## 2023-02-15 DIAGNOSIS — E53.8 B12 DEFICIENCY: Primary | Chronic | ICD-10-CM

## 2023-02-15 DIAGNOSIS — M25.661 KNEE JOINT STIFFNESS, BILATERAL: ICD-10-CM

## 2023-02-15 DIAGNOSIS — D64.9 ANEMIA, UNSPECIFIED TYPE: Chronic | ICD-10-CM

## 2023-02-15 DIAGNOSIS — M25.652 HIP JOINT STIFFNESS, BILATERAL: ICD-10-CM

## 2023-02-15 DIAGNOSIS — R26.9 GAIT DISTURBANCE: Primary | ICD-10-CM

## 2023-02-15 DIAGNOSIS — M25.651 HIP JOINT STIFFNESS, BILATERAL: ICD-10-CM

## 2023-02-15 DIAGNOSIS — M25.662 KNEE JOINT STIFFNESS, BILATERAL: ICD-10-CM

## 2023-02-15 DIAGNOSIS — E61.1 IRON DEFICIENCY: ICD-10-CM

## 2023-02-15 DIAGNOSIS — R29.898 WEAKNESS OF BOTH HIPS: ICD-10-CM

## 2023-02-15 LAB
BASOPHILS # BLD AUTO: 0.02 10*3/MM3 (ref 0–0.2)
BASOPHILS NFR BLD AUTO: 0.5 % (ref 0–1.5)
DEPRECATED RDW RBC AUTO: 46.7 FL (ref 37–54)
EOSINOPHIL # BLD AUTO: 0.24 10*3/MM3 (ref 0–0.4)
EOSINOPHIL NFR BLD AUTO: 5.6 % (ref 0.3–6.2)
ERYTHROCYTE [DISTWIDTH] IN BLOOD BY AUTOMATED COUNT: 13.6 % (ref 12.3–15.4)
FERRITIN SERPL-MCNC: 247 NG/ML (ref 30–400)
HCT VFR BLD AUTO: 34.9 % (ref 37.5–51)
HGB BLD-MCNC: 11 G/DL (ref 13–17.7)
HGB RETIC QN AUTO: 33.5 PG (ref 29.8–36.1)
IMM GRANULOCYTES # BLD AUTO: 0.01 10*3/MM3 (ref 0–0.05)
IMM GRANULOCYTES NFR BLD AUTO: 0.2 % (ref 0–0.5)
IMM RETICS NFR: 9.4 % (ref 3–15.8)
IRON 24H UR-MRATE: 58 MCG/DL (ref 59–158)
IRON SATN MFR SERPL: 19 % (ref 14–48)
LYMPHOCYTES # BLD AUTO: 1.33 10*3/MM3 (ref 0.7–3.1)
LYMPHOCYTES NFR BLD AUTO: 31.3 % (ref 19.6–45.3)
MCH RBC QN AUTO: 29.7 PG (ref 26.6–33)
MCHC RBC AUTO-ENTMCNC: 31.5 G/DL (ref 31.5–35.7)
MCV RBC AUTO: 94.3 FL (ref 79–97)
MONOCYTES # BLD AUTO: 0.49 10*3/MM3 (ref 0.1–0.9)
MONOCYTES NFR BLD AUTO: 11.5 % (ref 5–12)
NEUTROPHILS NFR BLD AUTO: 2.16 10*3/MM3 (ref 1.7–7)
NEUTROPHILS NFR BLD AUTO: 50.9 % (ref 42.7–76)
NRBC BLD AUTO-RTO: 0 /100 WBC (ref 0–0.2)
PLATELET # BLD AUTO: 163 10*3/MM3 (ref 140–450)
PMV BLD AUTO: 9.1 FL (ref 6–12)
RBC # BLD AUTO: 3.7 10*6/MM3 (ref 4.14–5.8)
RETICS # AUTO: 0.03 10*6/MM3 (ref 0.02–0.13)
RETICS/RBC NFR AUTO: 0.75 % (ref 0.7–1.9)
TIBC SERPL-MCNC: 307 MCG/DL (ref 249–505)
TRANSFERRIN SERPL-MCNC: 219 MG/DL (ref 200–360)
WBC NRBC COR # BLD: 4.25 10*3/MM3 (ref 3.4–10.8)

## 2023-02-15 PROCEDURE — 97110 THERAPEUTIC EXERCISES: CPT | Performed by: PHYSICAL THERAPIST

## 2023-02-15 PROCEDURE — 85025 COMPLETE CBC W/AUTO DIFF WBC: CPT

## 2023-02-15 PROCEDURE — 82728 ASSAY OF FERRITIN: CPT

## 2023-02-15 PROCEDURE — 97116 GAIT TRAINING THERAPY: CPT | Performed by: PHYSICAL THERAPIST

## 2023-02-15 PROCEDURE — 99214 OFFICE O/P EST MOD 30 MIN: CPT | Performed by: INTERNAL MEDICINE

## 2023-02-15 PROCEDURE — 83540 ASSAY OF IRON: CPT

## 2023-02-15 PROCEDURE — 84466 ASSAY OF TRANSFERRIN: CPT

## 2023-02-15 PROCEDURE — 85046 RETICYTE/HGB CONCENTRATE: CPT

## 2023-02-15 PROCEDURE — 36415 COLL VENOUS BLD VENIPUNCTURE: CPT

## 2023-02-15 NOTE — PROGRESS NOTES
Physical Therapy Daily Treatment Note  [address]  [facility] [address]  [phone]    Patient: Jose Hurtado   : 1950  Referring practitioner: Brandin Bolton MD  Date of Initial Visit: Type: THERAPY  Noted: 2022  Today's Date: 2023  Patient seen for 21 sessions       Visit Diagnoses:    ICD-10-CM ICD-9-CM   1. Gait disturbance  R26.9 781.2   2. Weakness of both hips  R29.898 729.89   3. Hip joint stiffness, bilateral  M25.651 719.55    M25.652    4. Knee joint stiffness, bilateral  M25.661 719.56    M25.662        Jose Hurtado reports: Apologized for running late due to traffic. Feeling good.      Objective   See Exercise, Manual, and Modality Logs for complete treatment.       Assessment/Plan  Much improved jf/speed on treadmill today. Significant decrease in lyphedema in B ankles as noted by baggy socks. Pt had some soreness in hip flexors nd abs but not pain    Progress per Plan of Care        Timed:         Manual Therapy:    8     mins  27688;     Therapeutic Exercise:    15     mins  65746;     Neuromuscular Wil:        mins  77880;    Therapeutic Activity:          mins  31496;     Gait Training:      10     mins  49502;     Ultrasound:          mins  09946;    Ionto                                   mins   79664  Self Care                            mins   48546      Un-Timed:  Electrical Stimulation:         mins  21851 ( );  Dry Needling          mins self-pay  Traction          mins 70551  Canalith Repos         mins [id number]      Timed Treatment:   33   mins   Total Treatment:     33   mins    Caroline Vasquez PT  Physical Therapist  [place] License # [id number]

## 2023-02-15 NOTE — PROGRESS NOTES
Subjective .     REASONS FOR FOLLOWUP:  Esophageal cancer, cytopenias     HISTORY OF PRESENT ILLNESS:  The patient is a 73 y.o. year old male  who is here for follow-up with the above-mentioned history.    Feeling okay.  No bleeding, chest pain, SOA    Recently in the hospital for an RSV infection    Past Medical History:   Diagnosis Date   • Acute deep vein thrombosis (DVT) of popliteal vein of left lower extremity (HCC) 03/24/2020   • Anemia    • Anti-phospholipid syndrome (HCC)     On lifelong anticoagulation therapy   • Bladder disorder     LEAKAGE   ON MED  wers pads   • Bleeding hemorrhoids    • Chronic kidney disease    • Community acquired pneumonia     HISTORY OF IN 2014   • Congestive heart failure (HCC)    • COPD (chronic obstructive pulmonary disease) (HCC)    • Deep venous thrombosis (HCC) 2006, 2008    Left lower extremity multiple   • Depression    • Esophageal carcinoma (HCC) 12/31/2014    had chemo and radiation prior surgery   • Hemorrhoids    • HH (hiatus hernia)    • History of atrial fibrillation 2015    ONE EPISODE WHILE HOSPITALIZED   • History of kidney stones    • History of nephrolithiasis    • History of pancreatitis     PT STATES MANY YEARS AGO   • History of radiation therapy    • History of transfusion    • Hypertension    • Long-term (current) use of anticoagulants, INR goal 2.0-3.0    • Lymphedema    • Malignant neoplasm of prostate (HCC)    • Other hyperlipidemia 01/30/2018 January 30, 2018 lipid panel risk 12.8%   • Restless legs syndrome    • Sleep apnea     OCCASIONALLY WEARS CPAP   • Squamous carcinoma     on the head     Past Surgical History:   Procedure Laterality Date   • APPENDECTOMY  1950   • BRONCHOSCOPY      (Diagnostic)   • CARDIAC CATHETERIZATION N/A 5/14/2022    Procedure: Left Heart Cath;  Surgeon: Eric So MD;  Location: [facility] INVASIVE LOCATION;  Service: Cardiology;  Laterality: N/A;   • CARDIAC CATHETERIZATION N/A 5/14/2022    Procedure:  Coronary angiography;  Surgeon: Eric So MD;  Location: [facility] CATH INVASIVE LOCATION;  Service: Cardiology;  Laterality: N/A;   • CARDIAC CATHETERIZATION N/A 5/14/2022    Procedure: Left ventriculography;  Surgeon: Eric So MD;  Location: [facility] CATH INVASIVE LOCATION;  Service: Cardiology;  Laterality: N/A;   • CATARACT EXTRACTION Bilateral 2014   • COLONOSCOPY  12/15/2014    Complete / Description: EH, IH, torts, stool, follow-up colonoscopy due in 5 years.   • COLONOSCOPY N/A 6/13/2017    non-thrombosed external hemorrhoids, normal examined ileum, IH   • COLONOSCOPY N/A 2/26/2019    Procedure: COLONOSCOPY with Cold Polypectomy;  Surgeon: Mulugeta Millan MD;  Location: [facility] ENDOSCOPY;  Service: Gastroenterology   • COLONOSCOPY N/A 12/16/2020    Procedure: COLONOSCOPY to cecum into TI;  Surgeon: Mesha Sanchez MD;  Location: [facility] ENDOSCOPY;  Service: Gastroenterology;  Laterality: N/A;  pre: lower GI bleed  post: hemorrhoids    • CYSTOSCOPY W/ LASER LITHOTRIPSY     • ENDOSCOPY N/A 6/13/2017    Procedure: ESOPHAGOGASTRODUODENOSCOPY WITH COLD BIOPSY;  Surgeon: Lake Gonzalez MD;  Location: [facility] ENDOSCOPY;  Service:    • ENDOSCOPY N/A 11/18/2021    Procedure: ESOPHAGOGASTRODUODENOSCOPY WITH  DILATATION WITH FLUOROSCOPY;  Surgeon: Jose Christine III, MD;  Location: [facility] ENDOSCOPY;  Service: Gastroenterology;  Laterality: N/A;  Pre: dysphagia, h/x of adinocarcinoma of esophagus  Post: same   • ESOPHAGECTOMY      April 2015, stage IIB esophageal carcinoma, sub-total resection.   • ESOPHAGECTOMY      Esophagectomy Subtotal Andrea Joe Procedure   • EXCISION LESION  08/2012    Removal of Squamous Cell CA on Head   • HAMMER TOE REPAIR  09/2014    Hammertoe Operation (Each Toe), 10/2014   • HAMMER TOE REPAIR Left 10/3/2017    Procedure: Left second third and fourth distal interphalangeal joint resection with flexor tenotomy;  Surgeon: Mluugeta Lira MD;  Location:  [facility] OR OSC;  Service:    • HEMORRHOIDECTOMY N/A 12/23/2020    Procedure: HEMORRHOIDECTOMY;  Surgeon: Billy Julio Jr., MD;  Location: [facility] OR;  Service: General;  Laterality: N/A;   • HERNIA REPAIR      incisional   • JEJUNOSTOMY      Laparoscopic   • JEJUNOSTOMY      tube removal    • KNEE SURGERY Bilateral 1967, 1973, 1981   • PATELLA SURGERY Left     removed   • PILONIDAL CYST / SINUS EXCISION     • IL ARTHRP KNE CONDYLE&PLATU MEDIAL&LAT COMPARTMENTS Right 3/26/2018    Procedure: TOTAL KNEE ARTHROPLASTY;  Surgeon: Renny Solis MD;  Location: [facility] OR;  Service: Orthopedics   • PROSTATECTOMY  2010   • PYLOROPLASTY     • SPINAL FUSION  02/1998    C 5,6   • TONSILLECTOMY     • UPPER GASTROINTESTINAL ENDOSCOPY  12/15/2014    LA Grade D esophagitis, Pardo's, HH, multiple duodenal ulcers   • VENTRAL/INCISIONAL HERNIA REPAIR N/A 4/14/2016    Procedure: VENTRAL/INCISIONAL HERNIA REPAIR, open, with mesh, and component separation;  Surgeon: Darren Rivas MD;  Location: [facility] OR;  Service:        HEMATOLOGIC/ONCOLOGIC HISTORY:  (History from previous dates can be found in the separate document.)    MEDICATIONS    Current Outpatient Medications:   •  albuterol sulfate  (90 Base) MCG/ACT inhaler, Inhale 1 puff Every 4 (Four) Hours As Needed for Wheezing., Disp: , Rfl:   •  atorvastatin (LIPITOR) 40 MG tablet, Take 1 tablet by mouth Every Night., Disp: 90 tablet, Rfl: 3  •  cyanocobalamin 1000 MCG/ML injection, , Disp: , Rfl:   •  Eliquis 5 MG tablet tablet, TAKE ONE TABLET BY MOUTH EVERY 12 HOURS, Disp: 60 tablet, Rfl: 1  •  furosemide (LASIX) 40 MG tablet, Take 1 tablet by mouth 2 (Two) Times a Day., Disp: , Rfl:   •  Gemtesa 75 MG tablet, Daily., Disp: , Rfl:   •  metoprolol tartrate (LOPRESSOR) 50 MG tablet, Take 1 tablet by mouth 2 (Two) Times a Day for 60 days., Disp: 60 tablet, Rfl: 1  •  pantoprazole (PROTONIX) 40 MG EC tablet, TAKE ONE TABLET BY MOUTH TWICE A DAY BEFORE A  MEAL, Disp: 180 tablet, Rfl: 3  •  PARoxetine (PAXIL) 40 MG tablet, TAKE ONE TABLET BY MOUTH EVERY MORNING, Disp: 30 tablet, Rfl: 5  •  potassium chloride (MICRO-K) 10 MEQ CR capsule, Take 1 capsule by mouth Daily., Disp: 90 capsule, Rfl: 5  •  pramipexole (MIRAPEX) 1.5 MG tablet, TAKE ONE TABLET BY MOUTH TWICE A DAY (Patient taking differently: Take 3 mg by mouth Every Night.), Disp: 180 tablet, Rfl: 1  •  tiotropium bromide monohydrate (Spiriva Respimat) 2.5 MCG/ACT aerosol solution inhaler, Inhale 2 puffs Daily., Disp: 4 g, Rfl: 2    ALLERGIES:   No Known Allergies    SOCIAL HISTORY:       Social History     Socioeconomic History   • Marital status:      Spouse name: Lena   • Number of children: 0   • Years of education: College   Tobacco Use   • Smoking status: Former     Packs/day: 2.00     Years: 3.00     Pack years: 6.00     Types: Cigarettes     Quit date:      Years since quittin.1   • Smokeless tobacco: Former     Types: Chew   • Tobacco comments:     Caffeine - coffee and soda    Vaping Use   • Vaping Use: Never used   Substance and Sexual Activity   • Alcohol use: Yes     Alcohol/week: 3.0 standard drinks     Types: 1 Glasses of wine, 1 Cans of beer, 1 Shots of liquor per week     Comment: 2   • Sexual activity: Defer         FAMILY HISTORY:  Family History   Problem Relation Age of Onset   • Cerebral aneurysm Mother         cerebral artery aneurysm ( age 56)   • Prostate cancer Brother 68   • Anxiety disorder Father    • Suicide Attempts Father          of suicide   • Cancer Father         bladder   • No Known Problems Brother    • Pancreatic cancer Nephew    • Colon cancer Neg Hx    • Esophageal cancer Neg Hx    • Dementia Neg Hx    • Malig Hyperthermia Neg Hx        REVIEW OF SYSTEMS:    Review of Systems   Constitutional: Negative for activity change.   HENT: Negative for nosebleeds and trouble swallowing.    Respiratory: Positive for shortness of breath. Negative for  "wheezing.    Cardiovascular: Negative for chest pain and palpitations.   Gastrointestinal: Negative for constipation and diarrhea.   Genitourinary: Negative for dysuria and hematuria.   Musculoskeletal: Negative for arthralgias and myalgias.   Neurological: Negative for seizures and syncope.   Hematological: Negative for adenopathy. Does not bruise/bleed easily.   Psychiatric/Behavioral: Negative for confusion.           Objective    Vitals:    02/15/23 1044   BP: 122/56   Pulse: 64   Resp: 18   Temp: 97.6 °F (36.4 °C)   TempSrc: Temporal   SpO2: 97%   Weight: 93.5 kg (206 lb 1.6 oz)   Height: 195.6 cm (77.01\")   PainSc: 0-No pain     Current Status 2/15/2023   ECOG score 0      PHYSICAL EXAM:          CONSTITUTIONAL:  Vital signs reviewed.  No distress, looks comfortable.  EYES:  Conjunctiva and lids unremarkable.  PERRLA  EARS,NOSE,MOUTH,THROAT:  Ears and nose appear unremarkable.  Lips, teeth, gums appear unremarkable.  RESPIRATORY:  Normal respiratory effort.  Lungs clear to auscultation bilaterally.  CARDIOVASCULAR:  Normal S1, S2.  No murmurs rubs or gallops.  Significant bilateral lower extremity edema, unchanged.  Has been to the lymphedema clinic for this.  GASTROINTESTINAL: Abdomen appears unremarkable.  Nontender.  No hepatomegaly.  No splenomegaly.  LYMPHATIC:  No cervical, supraclavicular, axillary lymphadenopathy.  SKIN:  Warm.  No rashes.  PSYCHIATRIC:  Normal judgment and insight.  Normal mood and affect.          RECENT LABS:        WBC   Date/Time Value Ref Range Status   02/15/2023 10:33 AM 4.25 3.40 - 10.80 10*3/mm3 Final   04/15/2019 12:31 PM 3.53 3.40 - 10.80 10*3/mm3 Final   04/16/2018 04:25 PM 4.23 (L) 4.5 - 11.0 10*3/uL Final     Hemoglobin   Date/Time Value Ref Range Status   02/15/2023 10:33 AM 11.0 (L) 13.0 - 17.7 g/dL Final   04/16/2018 04:25 PM 9.9 (L) 13.5 - 17.5 g/dL Final     Platelets   Date/Time Value Ref Range Status   02/15/2023 10:33  140 - 450 10*3/mm3 Final   04/16/2018 " 04:25  140 - 440 10*3/uL Final       Assessment/Plan     ASSESSMENT:  B12 deficiency    Anemia, unspecified type      *Esophageal adenocarcinoma. Initially T2N0M0. After neoadjuvant carboplatin/Taxol with radiation, achieved a pathologic CR at resection, 4/24/2015.   · As per NCCN guidelines, plan CAT scans every 6 months x1 year, then every 6 to 9 months on years 2 and years 3. Defer any surveillance EGDs to Dr. Gonzalez if he feels they are appropriate.   · Also as per NCCN guidelines, plan H and P every 3 to 6 months on years 1 and years 2, then every 6 to 12 months' time on years 3 through years 5 and then annually.   · EGD 6/13/17 by Dr. Gonzalez: No evidence of recurrence.  · CT scan 3/2/18 (this completed 3 years of surveillance CT): No evidence of recurrence.  Plan no more surveillance CT.  · EGD 11/18/2021, Dr. Christine: No evidence of recurrent cancer.  Dilated.  No signs of recurrence.  Remaining in remission.    *Recurrent DVT, prior to cancer diagnosis.    · Dr. Bolton previously managed his Coumadin.   · Chronic left leg larger than right, since DVT  · Acute left popliteal, gastrocnemius DVT on 3/24/2020 despite INR 3.4.  Therefore, changed to Eliquis.  · On 3/25/2020, unremarkable: Lupus anticoagulant, beta-2 glycoprotein antibodies.  Anticardiolipin antibodies also felt to be unremarkable with IgG and IgM both at 15 (negative is <15.  Indeterminate is 15-20).  No signs of recurrent DVT.    *CT angiogram chest 3/24/2020 (LLE acute DVT found 3/24/2020): Mild increased opacity LLL may represent developing infiltrate versus atelectasis.  Radiologist recommended follow-up.  · CT chest 5/1/2020: Resolution of groundglass LLL infiltrate from the 3/24/2020 CT.  A few mildly enlarged mediastinal nodes are less prominent than on the 3/18/2020 CT.  No new abnormalities.  Plan no more follow-up CT scans.    *Persistent cough.  He has seen Dr. Maher Sayied for this.    He did not complain of this  today.    *Previously complained of emesis occurring 5 hours after eating on average once every month or 2.  No nausea otherwise.  Denies dysphagia or odynophagia.    Unchanged.  Occurring on average once every couple of months.  He did not complain of this today    *Iron deficiency anemia  · Hb mostly around 11  · On 4/15/2019, ferritin 29.7, 13% saturation.  Serum folate normal.  · On oral iron daily through PCP.  · On 8/23/19, ferritin 65, 14%.   · Increased oral iron.   · On 10/15/19, ferritin 72, 10%.  · On 10/25/2019, stopped oral iron since it was not helping.    · Oral iron not effective due to poor absorption  · 2 doses Injectafer.  · On 12/13/2019, ferritin 708, 15% saturation, Hb 10.4.  · Therefore, no IV iron.  Monitor.  · On 5/5/2020, ferritin 346, 15% saturation, Hb 9.9.  Therefore, no need for IV iron at this time.  · On 7/7/2020, Hb up to 11.7.  Iron labs normal.  · Admission 12/15/2020: Hb 6.6.  Ferritin 40, iron saturation 17%.  Discharged on 12/21/2020 with Hb 7.1, which fell from 7.9 on 12/18/2020, from 8.9 in the early morning 12/18/2020.  The drop in Hb was thought to be due to hemorrhoidal bleeding related to Eliquis.  Eliquis was stopped.  Hemorrhoidal repair planned by Dr. Flynn Julio after the holidays.  Was given Venofer 300 mg on 12/17/2020 by Dr. Ross of our practice  · On 12/29/2020, ferritin 176, 13% saturation, Hb 8.4, reticulate hemoglobin low at 25.7.  Suspect he would benefit from some more iron.  Given 1 dose Injectafer.  · On 2/2/2021, ferritin 317, 17% saturation, Hb 10.3.  Therefore, Hb gradually improved since the 1 dose of Injectafer.  · 3/2/2021: Ferritin iron 93, 11% saturation, Hb 11.9.  1 dose Injectafer.  · 3/23/2021: Ferritin 471, 20% saturation, Hb 10.8  · 7/6/2021: Ferritin 329, 21% saturation, Hb 10.8  · 9/28/2021: Ferritin 230, 20% saturation, Hb 11.4.  No need for IV iron.  · 12/28/2021: Ferritin 337, 6% iron saturation, Hb 10.4.  No IV iron given.  (Although  saturation is low, ferritin is 337).  · 3/22/2022: Ferritin 112, 12% saturation, Hb 10.6.  Plan 1 dose Injectafer.  · 5/31/2022: Ferritin 473, 23% saturation, Hb 11.4  · 8/30/2022: Ferritin 308, 17% saturation, Hb 11.7.  No need for Injectafer  · 11/22/2022: Ferritin 268, 14% saturation.  Hb 11.9.  No need for Injectafer.  · 2/15/2023: Hb 11.  Ferritin 247, 19% saturation.  No need for Injectafer.    *Hemorrhoidal bleeding with Hb down to 6.6, requiring admission, 12/15/2020.  Thought to be related to Eliquis.  · Eliquis was stopped.  · Hemorrhoidal repair by Dr. Flynn Julio, 12/23/2020  · Eliquis subsequently restarted with no more issues with bleeding.  · 1 episode of dark stools yesterday, 9/27/2021.  Told him if he notices more of this he should contact Dr. Sanchez.  · 3/22/2022: Denies any rectal bleeding.  States this has not really been an issue since the hemorrhoidal repair  · 8/30/2022: Denies bleeding  · 11/22/2022: Denies bleeding    *9/28/2021: Change in stool frequency.  · Was having a bowel movement 3 times per week.  For the past week has been having 3 formed bowel movements per day.  I told him if this persists he should discuss with Dr. Sanchez.    *Source of iron deficiency.  · Last colonoscopy 2/26/2019 by Dr. Millan.  Last EGD 6/13/2017 by Dr. Gonzalez.  · Suspect he has an absorption issue due to previous esophageal surgery.    *B12 deficiency.  · On 6/6/2019, B12 <150  · On B12 injections through PCP.  · B12 on 5/5/2020 normal at 694  · B12 on 2/2/2021, 789    *Anemia for reasons in addition to iron deficiency and B12 deficiency  · Baseline Hb mostly 10.5-11.5.  · On 5/5/2020, unremarkable: RBC folate, iron labs, B12, haptoglobin, TB, LDH, direct Maki.  · Creatinine baseline is 0.9-1.2   · Hb 9.9 on 5/5/2020  · On 7/7/2020, Hb up to 11.7.  Return to prior follow-up plan.  · On 2/2/2021, B12 and RBC folate unremarkable  · 3/23/2021: Hb 10.8 despite normal iron stores.  · 5/25/2021, Hb 10.7 with  normal iron labs  · 7/6/2021, Hb 10.8 with normal iron labs  · Hb 11, from 10.2, from 11.9, from 11.7, from 11.4    *Leukocytopenia  · New issue on 3/23/2021, WBC 3.38, based on recent labs, but WBC has been as low as 2.9 December 2019  · WBC 4.3, from 4.1, from 4.6, from 4    *Neutropenia  · ANC 1590 on 3/23/2021 (previously ANC has been as low as 1480 with WBC in the low normal range)  · ANC 2160, from 2110, from 2560, from 1690    *Thrombocytopenia  · New issue on 3/23/2021, , based on recent labs, but PLT has been as low as 119 October 2019  · , from 155, from 174, from 158    *Dyspnea on exertion x2 weeks on 11/22/2022 visit  · Advised to discuss with his PCP and in the meantime if it worsens he should go to the ER    *Admitted early January 2023 for respiratory failure due to RSV.  Also had A-fib with RVR.    *He had a white blood cell count in the upper 3s and a hemoglobin in the upper 12s with a normal platelet count prior to beginning chemotherapy.     PLAN:  · MD CBC stat ferritin, iron panel, reticulate hemoglobin, B12 level 3 months  · (I offered 6-month follow-up but he prefers to maintain 3-month)  · Baseline Hb when not in the hospital mostly 9.5-11.5  · Continue Eliquis    · hemorrhoids have been repaired.  Last drop of Hb was down to 7.1 on 12/21/2020.  (Hemorrhoidal bleeding)    Prior plan was:  · MD every 6-month.  · No more surveillance CT scans.  · He does not have a port.    I have discussed with him if Hb drops and remains around 9 or so, we may want to plan a bone marrow biopsy

## 2023-02-16 ENCOUNTER — APPOINTMENT (OUTPATIENT)
Dept: SPEECH THERAPY | Facility: HOSPITAL | Age: 73
End: 2023-02-16
Payer: MEDICARE

## 2023-02-17 ENCOUNTER — TREATMENT (OUTPATIENT)
Dept: PHYSICAL THERAPY | Facility: CLINIC | Age: 73
End: 2023-02-17
Payer: MEDICARE

## 2023-02-17 ENCOUNTER — OFFICE VISIT (OUTPATIENT)
Dept: INTERNAL MEDICINE | Age: 73
End: 2023-02-17
Payer: MEDICARE

## 2023-02-17 VITALS
HEART RATE: 58 BPM | TEMPERATURE: 97.7 F | OXYGEN SATURATION: 97 % | HEIGHT: 77 IN | WEIGHT: 211.8 LBS | DIASTOLIC BLOOD PRESSURE: 48 MMHG | SYSTOLIC BLOOD PRESSURE: 98 MMHG | BODY MASS INDEX: 25.01 KG/M2

## 2023-02-17 DIAGNOSIS — M25.652 HIP JOINT STIFFNESS, BILATERAL: ICD-10-CM

## 2023-02-17 DIAGNOSIS — R29.898 WEAKNESS OF BOTH HIPS: ICD-10-CM

## 2023-02-17 DIAGNOSIS — I89.0 LYMPHEDEMA: Primary | ICD-10-CM

## 2023-02-17 DIAGNOSIS — G63 POLYNEUROPATHY IN DISEASES CLASSIFIED ELSEWHERE: ICD-10-CM

## 2023-02-17 DIAGNOSIS — R26.9 GAIT DISTURBANCE: Primary | ICD-10-CM

## 2023-02-17 DIAGNOSIS — M25.651 HIP JOINT STIFFNESS, BILATERAL: ICD-10-CM

## 2023-02-17 PROCEDURE — 99213 OFFICE O/P EST LOW 20 MIN: CPT | Performed by: NURSE PRACTITIONER

## 2023-02-17 PROCEDURE — 97140 MANUAL THERAPY 1/> REGIONS: CPT | Performed by: PHYSICAL THERAPIST

## 2023-02-17 PROCEDURE — 97110 THERAPEUTIC EXERCISES: CPT | Performed by: PHYSICAL THERAPIST

## 2023-02-17 NOTE — PROGRESS NOTES
"    I N T E R N A L  M E D I C I N E  Halima Ty, APRN    ENCOUNTER DATE:  02/17/2023    Jose FITCH Hurtado / 73 y.o. / male      CHIEF COMPLAINT / REASON FOR OFFICE VISIT     Foot Pain (Both feet, X2 weeks)      ASSESSMENT & PLAN     Diagnoses and all orders for this visit:    1. Lymphedema (Primary)  Assessment & Plan:  Continue compression boots, lasix 40 mg twice daily, and extremity elevation. Follow up with Lymphedema Clinic as scheduled      2. Polyneuropathy in diseases classified elsewhere (HCC)       SUMMARY/DISCUSSION   Instructed to take Tylenol as needed. Patient instructed to avoid NSAIDs secondary to GFR 51.   Encouraged to elevate legs while sitting, Soak feet in Epsom salt as needed.  Instructed patient to cut out his salt in his diet and to take his water pills as directed daily  Patient followed by Nephrology, Lymphedema Clinic, Hematology/Oncology  Follow up with Dr Bolton scheduled 06/20/23    VITAL SIGNS     Visit Vitals  BP 98/48 (BP Location: Left arm, Patient Position: Sitting, Cuff Size: Adult)   Pulse 58   Temp 97.7 °F (36.5 °C) (Temporal)   Ht 195.6 cm (77.01\")   Wt 96.1 kg (211 lb 12.8 oz)   SpO2 97%   BMI 25.11 kg/m²           BP Readings from Last 3 Encounters:   02/17/23 98/48   02/15/23 122/56   02/10/23 120/60     Wt Readings from Last 3 Encounters:   02/17/23 96.1 kg (211 lb 12.8 oz)   02/15/23 93.5 kg (206 lb 1.6 oz)   02/10/23 91.2 kg (201 lb)     Body mass index is 25.11 kg/m².    Blood pressure readings recorded on patient flowsheet:  No flowsheet data found.          MEDICATIONS AT THE TIME OF OFFICE VISIT     Current Outpatient Medications on File Prior to Visit   Medication Sig Dispense Refill   • albuterol sulfate  (90 Base) MCG/ACT inhaler Inhale 1 puff Every 4 (Four) Hours As Needed for Wheezing.     • atorvastatin (LIPITOR) 40 MG tablet Take 1 tablet by mouth Every Night. 90 tablet 3   • cyanocobalamin 1000 MCG/ML injection      • Eliquis 5 MG tablet tablet TAKE ONE " TABLET BY MOUTH EVERY 12 HOURS 60 tablet 1   • furosemide (LASIX) 40 MG tablet Take 1 tablet by mouth 2 (Two) Times a Day.     • Gemtesa 75 MG tablet Daily.     • metoprolol tartrate (LOPRESSOR) 50 MG tablet Take 1 tablet by mouth 2 (Two) Times a Day for 60 days. 60 tablet 1   • pantoprazole (PROTONIX) 40 MG EC tablet TAKE ONE TABLET BY MOUTH TWICE A DAY BEFORE A MEAL 180 tablet 3   • PARoxetine (PAXIL) 40 MG tablet TAKE ONE TABLET BY MOUTH EVERY MORNING 30 tablet 5   • potassium chloride (MICRO-K) 10 MEQ CR capsule Take 1 capsule by mouth Daily. 90 capsule 5   • pramipexole (MIRAPEX) 1.5 MG tablet TAKE ONE TABLET BY MOUTH TWICE A DAY (Patient taking differently: Take 3 mg by mouth Every Night.) 180 tablet 1   • tiotropium bromide monohydrate (Spiriva Respimat) 2.5 MCG/ACT aerosol solution inhaler Inhale 2 puffs Daily. 4 g 2     No current facility-administered medications on file prior to visit.        HISTORY OF PRESENT ILLNESS     73-year-old male being seen today for concerns about pain in his feet daily for the last 2 weeks.  Patient with a history of lymphedema and 2+ swelling bilaterally.  Does wear compression boots per the lymphedema clinic.  Patient states that he does not elevate his legs while he sitting and does not monitor his sodium in his diet.  Patient negative for signs and symptoms of DVT does not have any pain when he is walking, no redness or discomfort, negative michael's sign.     Patient Care Team:  Brandin Bolton MD as PCP - General (Internal Medicine)  George Phelan II, MD as Consulting Physician (Hematology and Oncology)  Jose Inman MD as Consulting Physician (Urology)  Mulugeta Millan MD as Consulting Physician (Gastroenterology)  Jose Christine III, MD as Referring Physician (Thoracic Surgery)  Seth Randhawa MD as Consulting Physician (Ophthalmology)  James Cyr MD as Consulting Physician (Cardiology)  Stephon Sommers MD as Consulting Physician (Sleep  Medicine)  Rolanda Solomon MD as Consulting Physician (Nephrology)    REVIEW OF SYSTEMS     Review of Systems   Constitutional: Negative for activity change.   HENT: Negative.    Respiratory: Negative.  Negative for cough and shortness of breath.    Cardiovascular: Positive for leg swelling. Negative for chest pain and palpitations.        Chronic lymphedema bilateral extremities with 2+ edema. Wearing compression stockings today    Musculoskeletal: Positive for arthralgias and gait problem.        Dependent ambulation with cane   Skin: Negative.    Psychiatric/Behavioral: Negative.           PHYSICAL EXAMINATION     Physical Exam  Cardiovascular:      Rate and Rhythm: Normal rate and regular rhythm.      Pulses: Normal pulses.      Heart sounds: Normal heart sounds.   Pulmonary:      Effort: Pulmonary effort is normal.      Breath sounds: Normal breath sounds.   Musculoskeletal:         General: Swelling present.      Comments: 2 + bilateral extremity edema thigh to calf with Hx Lymphedema wearing compression stockings. Negative for Wendy's sign, no redness or warmth in extremities. States foot pain for past 2 weeks especially at night   Skin:     General: Skin is warm and dry.   Neurological:      Mental Status: He is alert and oriented to person, place, and time.             REVIEWED DATA     Labs:     Lab Results   Component Value Date     01/04/2023    K 4.5 01/04/2023    CALCIUM 8.0 (L) 01/04/2023    AST 25 01/02/2023    ALT 18 01/02/2023    BUN 22 01/04/2023    CREATININE 1.44 (H) 01/04/2023    CREATININE 1.23 01/03/2023    CREATININE 1.47 (H) 01/02/2023    EGFRIFNONA 62 06/04/2021    EGFRIFAFRI 75 06/04/2021       Lab Results   Component Value Date    HGBA1C 5.50 08/06/2021    HGBA1C 6.20 (H) 01/17/2020       Lab Results   Component Value Date    LDL 98 06/02/2022    LDL 92 05/10/2018     (H) 01/30/2018    HDL 60 06/02/2022    HDL 45 05/10/2018    TRIG 53 06/02/2022    TRIG 79 05/10/2018        Lab Results   Component Value Date    TSH 2.710 01/17/2020    TSH 2.050 06/06/2019    TSH 3.440 02/13/2019    FREET4 1.52 01/17/2020    FREET4 1.18 06/06/2019    FREET4 1.33 02/13/2019       Lab Results   Component Value Date    WBC 4.25 02/15/2023    HGB 11.0 (L) 02/15/2023     02/15/2023       No results found for: MALBCRERATIO       Imaging:           Medical Tests:           Summary of old records / correspondence / consultant report:           Request outside records:           FAYE Singer

## 2023-02-17 NOTE — ASSESSMENT & PLAN NOTE
Continue compression boots, lasix 40 mg twice daily, and extremity elevation. Follow up with Lymphedema Clinic as scheduled

## 2023-02-20 NOTE — PROGRESS NOTES
Physical Therapy Daily Treatment Note  [address]  [facility] [address]  [phone]    Patient: Jose Hurtado   : 1950  Referring practitioner: Brandin Bolton MD  Date of Initial Visit: Type: THERAPY  Noted: 2022  Today's Date: 2023  Patient seen for 22 sessions       Visit Diagnoses:  No diagnosis found.    Jose Hurtado reports: Current exercises seem to be helping. I can walk better and longer but still cant stand up straight      Objective   See Exercise, Manual, and Modality Logs for complete treatment.       Assessment/Plan  No complaints with therex. Still unable to perform LINDA keeping pelvis on table due to decreased lumbar extension and hip flexor/ab tightness    Progress per Plan of Care        Timed:         Manual Therapy:    8     mins  23521;     Therapeutic Exercise:    15     mins  73057;     Neuromuscular Wil:        mins  20488;    Therapeutic Activity:          mins  93382;     Gait Training:           mins  27485;     Ultrasound:          mins  15420;    Ionto                                   mins   84319  Self Care                            mins   80833      Un-Timed:  Electrical Stimulation:         mins  00934 ( );  Dry Needling          mins self-pay  Traction          mins 24569  Canalith Repos         mins 24191      Timed Treatment:   23   mins   Total Treatment:     35   mins    Caroline Vasquez PT  Physical Therapist  [place] License # [id number]

## 2023-02-22 ENCOUNTER — TREATMENT (OUTPATIENT)
Dept: PHYSICAL THERAPY | Facility: CLINIC | Age: 73
End: 2023-02-22
Payer: MEDICARE

## 2023-02-22 DIAGNOSIS — M25.651 HIP JOINT STIFFNESS, BILATERAL: ICD-10-CM

## 2023-02-22 DIAGNOSIS — R26.9 GAIT DISTURBANCE: Primary | ICD-10-CM

## 2023-02-22 DIAGNOSIS — R29.898 WEAKNESS OF BOTH HIPS: ICD-10-CM

## 2023-02-22 DIAGNOSIS — M25.652 HIP JOINT STIFFNESS, BILATERAL: ICD-10-CM

## 2023-02-22 PROCEDURE — 97110 THERAPEUTIC EXERCISES: CPT | Performed by: PHYSICAL THERAPIST

## 2023-02-22 PROCEDURE — 97140 MANUAL THERAPY 1/> REGIONS: CPT | Performed by: PHYSICAL THERAPIST

## 2023-02-23 ENCOUNTER — HOSPITAL ENCOUNTER (OUTPATIENT)
Dept: SPEECH THERAPY | Facility: HOSPITAL | Age: 73
Setting detail: THERAPIES SERIES
Discharge: HOME | End: 2023-02-23
Payer: MEDICARE

## 2023-02-23 DIAGNOSIS — R13.12 OROPHARYNGEAL DYSPHAGIA: Primary | ICD-10-CM

## 2023-02-23 DIAGNOSIS — R13.10 DYSPHAGIA, UNSPECIFIED TYPE: Primary | ICD-10-CM

## 2023-02-23 PROCEDURE — 92610 EVALUATE SWALLOWING FUNCTION: CPT

## 2023-02-23 NOTE — THERAPY EVALUATION
Outpatient Speech Language Pathology   Adult Swallow Initial Evaluation  [facility]     Patient Name: Jose Hurtado  : 1950  MRN: [id number]  Today's Date: 2023         Visit Date: 2023   Patient Active Problem List   Diagnosis   • History of esophageal cancer   • Adenomatous polyp of colon   • Malignant neoplasm of prostate (HCC)   • RLS (restless legs syndrome)   • History of DVT (deep vein thrombosis)   • Recurrent major depressive disorder, in partial remission (HCC)   • Hypertension   • Anemia   • Insomnia due to other mental disorder   • Peripheral polyneuropathy   • B12 deficiency   • Lymphedema   • Iron deficiency   • Gastroparesis   • Acute deep vein thrombosis (DVT) of popliteal vein of left lower extremity (HCC)   • Tremor of both hands   • Gastrointestinal hemorrhage   • Acute blood loss anemia   • Pancytopenia (HCC)   • Chronic venous hypertension due to DVT   • Bleeding hemorrhoid   • Malabsorption of iron   • Iron deficiency anemia   • History of esophageal cancer   • Abnormal CT scan   • Generalized weakness   • Chronic anticoagulation   • CKD (chronic kidney disease) stage 2, GFR 60-89 ml/min   • Dysphagia   • NSTEMI (non-ST elevated myocardial infarction) (HCC)   • Bronchitis   • Dyspnea   • Elevated troponin   • Atrial fibrillation (HCC)   • Chronic obstructive pulmonary disease (HCC)   • Congestive heart failure (HCC)        Past Medical History:   Diagnosis Date   • Acute deep vein thrombosis (DVT) of popliteal vein of left lower extremity (HCC) 2020   • Anemia    • Anti-phospholipid syndrome (HCC)     On lifelong anticoagulation therapy   • Bladder disorder     LEAKAGE   ON MED  wers pads   • Bleeding hemorrhoids    • Chronic kidney disease    • Community acquired pneumonia     HISTORY OF IN    • Congestive heart failure (HCC)    • COPD (chronic obstructive pulmonary disease) (HCC)    • Deep venous thrombosis (HCC) ,     Left lower extremity multiple   •  Depression    • Esophageal carcinoma (HCC) 12/31/2014    had chemo and radiation prior surgery   • Hemorrhoids    • HH (hiatus hernia)    • History of atrial fibrillation 2015    ONE EPISODE WHILE HOSPITALIZED   • History of kidney stones    • History of nephrolithiasis    • History of pancreatitis     PT STATES MANY YEARS AGO   • History of radiation therapy    • History of transfusion    • Hypertension    • Long-term (current) use of anticoagulants, INR goal 2.0-3.0    • Lymphedema    • Malignant neoplasm of prostate (HCC)    • Other hyperlipidemia 01/30/2018 January 30, 2018 lipid panel risk 12.8%   • Restless legs syndrome    • Sleep apnea     OCCASIONALLY WEARS CPAP   • Squamous carcinoma     on the head        Past Surgical History:   Procedure Laterality Date   • APPENDECTOMY  1950   • BRONCHOSCOPY      (Diagnostic)   • CARDIAC CATHETERIZATION N/A 5/14/2022    Procedure: Left Heart Cath;  Surgeon: Eric So MD;  Location: [facility] CATH INVASIVE LOCATION;  Service: Cardiology;  Laterality: N/A;   • CARDIAC CATHETERIZATION N/A 5/14/2022    Procedure: Coronary angiography;  Surgeon: Eric So MD;  Location: [facility] CATH INVASIVE LOCATION;  Service: Cardiology;  Laterality: N/A;   • CARDIAC CATHETERIZATION N/A 5/14/2022    Procedure: Left ventriculography;  Surgeon: Eric So MD;  Location[facility] CATH INVASIVE LOCATION;  Service: Cardiology;  Laterality: N/A;   • CATARACT EXTRACTION Bilateral 2014   • COLONOSCOPY  12/15/2014    Complete / Description: EH, IH, torts, stool, follow-up colonoscopy due in 5 years.   • COLONOSCOPY N/A 6/13/2017    non-thrombosed external hemorrhoids, normal examined ileum, IH   • COLONOSCOPY N/A 2/26/2019    Procedure: COLONOSCOPY with Cold Polypectomy;  Surgeon: Mulugeta Millan MD;  Location: [facility] ENDOSCOPY;  Service: Gastroenterology   • COLONOSCOPY N/A 12/16/2020    Procedure: COLONOSCOPY to cecum into TI;  Surgeon: Mesha Sanchez,  MD;  Location: [facility] ENDOSCOPY;  Service: Gastroenterology;  Laterality: N/A;  pre: lower GI bleed  post: hemorrhoids    • CYSTOSCOPY W/ LASER LITHOTRIPSY     • ENDOSCOPY N/A 6/13/2017    Procedure: ESOPHAGOGASTRODUODENOSCOPY WITH COLD BIOPSY;  Surgeon: Lake Gonzalez MD;  Location: [facility] ENDOSCOPY;  Service:    • ENDOSCOPY N/A 11/18/2021    Procedure: ESOPHAGOGASTRODUODENOSCOPY WITH  DILATATION WITH FLUOROSCOPY;  Surgeon: Jose Christine III, MD;  Location: [facility] ENDOSCOPY;  Service: Gastroenterology;  Laterality: N/A;  Pre: dysphagia, h/x of adinocarcinoma of esophagus  Post: same   • ESOPHAGECTOMY      April 2015, stage IIB esophageal carcinoma, sub-total resection.   • ESOPHAGECTOMY      Esophagectomy Subtotal [facility] Procedure   • EXCISION LESION  08/2012    Removal of Squamous Cell CA on Head   • HAMMER TOE REPAIR  09/2014    Hammertoe Operation (Each Toe), 10/2014   • HAMMER TOE REPAIR Left 10/3/2017    Procedure: Left second third and fourth distal interphalangeal joint resection with flexor tenotomy;  Surgeon: Mulugeta Lira MD;  Location: [facility] OR OSC;  Service:    • HEMORRHOIDECTOMY N/A 12/23/2020    Procedure: HEMORRHOIDECTOMY;  Surgeon: Billy Julio Jr., MD;  Location: [facility] OR;  Service: General;  Laterality: N/A;   • HERNIA REPAIR      incisional   • JEJUNOSTOMY      Laparoscopic   • JEJUNOSTOMY      tube removal    • KNEE SURGERY Bilateral 1967, 1973, 1981   • PATELLA SURGERY Left     removed   • PILONIDAL CYST / SINUS EXCISION     • SC ARTHRP KNE CONDYLE&PLATU MEDIAL&LAT COMPARTMENTS Right 3/26/2018    Procedure: TOTAL KNEE ARTHROPLASTY;  Surgeon: Renny Solis MD;  Location: [facility] MAIN OR;  Service: Orthopedics   • PROSTATECTOMY  2010   • PYLOROPLASTY     • SPINAL FUSION  02/1998    C 5,6   • TONSILLECTOMY     • UPPER GASTROINTESTINAL ENDOSCOPY  12/15/2014    LA Grade D esophagitis, Pardo's, HH, multiple duodenal ulcers   • VENTRAL/INCISIONAL HERNIA REPAIR N/A  4/14/2016    Procedure: VENTRAL/INCISIONAL HERNIA REPAIR, open, with mesh, and component separation;  Surgeon: Darren Rivas MD;  Location: [facility] MAIN OR;  Service:          Visit Dx:     ICD-10-CM ICD-9-CM   1. Oropharyngeal dysphagia  R13.12 787.22            OP SLP Assessment/Plan - 02/23/23 1514        SLP Assessment    Functional Problems Swallowing (P)   -AO    Impact on Function: Swallowing Risk of aspiration;Risk of pneumonia (P)   -AO    Clinical Impression: Swallowing esophageal dysphagia;Mild:;oropharyngeal phase dysphagia (P)   -AO    Please refer to paper survey for additional self-reported information Yes (P)   -AO    Please refer to items scanned into chart for additional diagnostic informaiton and handouts as provided by clinician Yes (P)   -AO    Prognosis Good (comment) (P)   -AO    Patient/caregiver participated in establishment of treatment plan and goals Yes (P)   -AO    Patient would benefit from skilled therapy intervention Yes (P)   -AO       SLP Plan    Frequency x1 day a week (P)   -AO    Duration 4-5 weeks (P)   -AO    Planned CPT's? SLP SWALLOW THERAPY: 70679 (P)   -AO    Expected Duration of Therapy Session (SLP Eval) 45 (P)   -AO          User Key  (r) = Recorded By, (t) = Taken By, (c) = Cosigned By    Initials Name Provider Type    AO Keren Barnard, Speech Therapy Student SLP Student                 SLP Adult Swallow Evaluation     Row Name 02/23/23 1300       Rehab Evaluation    Document Type evaluation (P)   -AO    Subjective Information no complaints (P)   -AO    Patient Observations alert;cooperative (P)   -AO    Patient Effort good (P)   -AO    Symptoms Noted During/After Treatment none (P)   -AO       General Information    Patient Profile Reviewed yes (P)   -AO    Pertinent History Of Current Problem History of adenocarcinoma of esophagus in 2014 and spinal fusion in 1994. History of previous and recent aspiration pneumonia and RSV. Hospitalized in January for RSV,  "chest xray revealed left lower lobe infultrates \"query aspiration pneumonia\". Patient reports a cronic cough, and coughs more while eating/drinking and right after a meal. Experiences reflux as well as coughing up previously swallowed food after a meal. Pt reports a recommendation by MD to eat in small bites and meals more frequently, however pt does not follow diet recommendations. Most recent VFSS completed in 2020 revealing mild oropharyngeal dysphagia. (P)   -AO    Current Method of Nutrition regular textures;thin liquids (P)   -AO    Precautions/Limitations, Vision WFL;for purposes of eval (P)   -AO    Precautions/Limitations, Hearing WFL;for purposes of eval (P)   -AO    Prior Level of Function-Communication WFL (P)   -AO    Prior Level of Function-Swallowing no diet consistency restrictions (P)   -AO    Plans/Goals Discussed with patient (P)   -AO    Barriers to Rehab none identified (P)   -AO       Oral Motor Structure and Function    Dentition Assessment natural, present and adequate (P)   -AO    Secretion Management WNL/WFL (P)   -AO    Mucosal Quality moist, healthy (P)   -AO       Oral Musculature and Cranial Nerve Assessment    Oral Motor General Assessment WFL (P)   -AO       General Eating/Swallowing Observations    Eating/Swallowing Skills self-fed (P)   -AO    Positioning During Eating upright in chair (P)   -AO    Utensils Used spoon;cup (P)   -AO    Consistencies Trialed regular textures;chopped;mixed consistency;pureed;thin liquids (P)   -AO       Clinical Swallow Eval    Clinical Swallow Evaluation Summary No overt s/s of pen/asp with thins via cup, puree, and regular solids. Noted spontaneous multiple swallows with puree, mechanical soft chopped, and regular solid. Spontaneous liquid wash after each trial of regular solids. One instance of coughing with mechanical soft chopped, adequate cough strength. Adequate mastication and laryngeal elevation upon neck palpation. Recommend VFSS to r/o " pharyngeal dysphagia. (P)   -AO       SLP Evaluation Clinical Impression    SLP Swallowing Diagnosis R/O pharyngeal dysphagia (P)   -AO    Functional Impact risk of aspiration/pneumonia (P)   -AO    Rehab Potential/Prognosis, Swallowing good, to achieve stated therapy goals (P)   -AO    Swallow Criteria for Skilled Therapeutic Interventions Met demonstrates skilled criteria (P)   -AO       Recommendations    Therapy Frequency (Swallow) 1 day per week (P)   -AO    Predicted Duration Therapy Intervention (Days) 4 weeks (P)   -AO    SLP Diet Recommendation regular textures;thin liquids (P)   -AO    Recommended Diagnostics VFSS (MBS) (P)   -AO    Recommended Precautions and Strategies upright posture during/after eating;small bites of food and sips of liquid;alternate between small bites of food and sips of liquid (P)   -AO    Oral Care Recommendations Oral Care BID/PRN (P)   -AO    SLP Rec. for Method of Medication Administration meds whole;as tolerated (P)   -AO    Monitor for Signs of Aspiration yes;notify SLP if any concerns (P)   -AO    Anticipated Discharge Disposition (SLP) home (P)   -AO          User Key  (r) = Recorded By, (t) = Taken By, (c) = Cosigned By    Initials Name Provider Type    AO Keren Barnard Speech Therapy Student SLP Student                               OP SLP Education     Row Name 02/23/23 1516       Education    Barriers to Learning No barriers identified (P)   -AO    Education Provided Described results of evaluation;Patient expressed understanding of evaluation (P)   -AO    Assessed Learning needs;Learning motivation (P)   -AO    Learning Motivation Strong (P)   -AO    Learning Method Explanation;Demonstration (P)   -AO    Teaching Response Verbalized understanding (P)   -AO          User Key  (r) = Recorded By, (t) = Taken By, (c) = Cosigned By    Initials Name Effective Dates    AO Keren Barnard Speech Therapy Student 01/12/23 -                SLP OP Goals     Row Name 02/23/23 1500           Goal Type Needed    Goal Type Needed Dysphagia (P)   -AO        Dysphagia Goals    Dysphagia LTG's Patient will safely consume the recommended diet without complications such as aspiration pneumonia (P)   -AO     Dysphagia STG's Patient will increase strength of tongue base and posterior pharyngeal walls to reduce residue that might fall into airway by completing;Patient will increase laryngeal elevation to reduce residue that might fall into airway by completing;Patient will compensate for oral/pharyngeal deficits and reduce risks while eating by utilizing  compensatory strategies (P)   -AO           User Key  (r) = Recorded By, (t) = Taken By, (c) = Cosigned By    Initials Name Provider Type    AO Keren Barnard, Speech Therapy Student SLP Student                       Time Calculation:   SLP Start Time: (P) 1315  SLP Stop Time: (P) 1345  SLP Time Calculation (min): (P) 30 min  Untimed Charges  SLP Eval/Re-eval : (P) ST Eval Oral Pharyng Swallow - [id number]  [id number] Eval Oral Pharyng Swallow Minutes: (P) 30  Total Minutes  Untimed Charges Total Minutes: (P) 30   Total Minutes: (P) 30    Therapy Charges for Today     Code Description Service Date Service Provider Modifiers Qty    [id number]  ST EVAL ORAL PHARYNG SWALLOW 2 2/23/2023 Keren Barnard Speech Therapy Student GN 1                   Keren Barnard Speech Therapy Student  2/23/2023

## 2023-02-24 ENCOUNTER — TELEPHONE (OUTPATIENT)
Dept: PHYSICAL THERAPY | Facility: CLINIC | Age: 73
End: 2023-02-24

## 2023-03-02 ENCOUNTER — APPOINTMENT (OUTPATIENT)
Dept: SPEECH THERAPY | Facility: HOSPITAL | Age: 73
End: 2023-03-02
Payer: MEDICARE

## 2023-03-03 DIAGNOSIS — G25.81 RLS (RESTLESS LEGS SYNDROME): Chronic | ICD-10-CM

## 2023-03-03 RX ORDER — APIXABAN 5 MG/1
TABLET, FILM COATED ORAL
Qty: 60 TABLET | Refills: 1 | Status: SHIPPED | OUTPATIENT
Start: 2023-03-03

## 2023-03-03 RX ORDER — PRAMIPEXOLE DIHYDROCHLORIDE 1.5 MG/1
TABLET ORAL
Qty: 180 TABLET | Refills: 1 | Status: SHIPPED | OUTPATIENT
Start: 2023-03-03

## 2023-03-09 ENCOUNTER — HOSPITAL ENCOUNTER (OUTPATIENT)
Dept: SPEECH THERAPY | Facility: HOSPITAL | Age: 73
Setting detail: THERAPIES SERIES
End: 2023-03-09
Payer: MEDICARE

## 2023-03-13 ENCOUNTER — HOSPITAL ENCOUNTER (OUTPATIENT)
Dept: GENERAL RADIOLOGY | Facility: HOSPITAL | Age: 73
Discharge: HOME | End: 2023-03-13
Admitting: INTERNAL MEDICINE
Payer: MEDICARE

## 2023-03-13 DIAGNOSIS — R13.10 DYSPHAGIA, UNSPECIFIED TYPE: ICD-10-CM

## 2023-03-13 DIAGNOSIS — R13.12 OROPHARYNGEAL DYSPHAGIA: Primary | ICD-10-CM

## 2023-03-13 PROCEDURE — 63710000001 BARIUM SULFATE 40 % RECONSTITUTED SUSPENSION: Performed by: INTERNAL MEDICINE

## 2023-03-13 PROCEDURE — 63710000001 BARIUM SULFATE 40 % SUSPENSION: Performed by: INTERNAL MEDICINE

## 2023-03-13 PROCEDURE — A9270 NON-COVERED ITEM OR SERVICE: HCPCS | Performed by: INTERNAL MEDICINE

## 2023-03-13 PROCEDURE — 74230 X-RAY XM SWLNG FUNCJ C+: CPT

## 2023-03-13 PROCEDURE — 92611 MOTION FLUOROSCOPY/SWALLOW: CPT | Performed by: SPEECH-LANGUAGE PATHOLOGIST

## 2023-03-13 PROCEDURE — 63710000001 BARIUM SULFATE 98 % RECONSTITUTED SUSPENSION: Performed by: INTERNAL MEDICINE

## 2023-03-13 RX ADMIN — BARIUM SULFATE 4 ML: 980 POWDER, FOR SUSPENSION ORAL at 13:38

## 2023-03-13 RX ADMIN — BARIUM SULFATE 50 ML: 400 SUSPENSION ORAL at 13:38

## 2023-03-13 RX ADMIN — BARIUM SULFATE 55 ML: 0.81 POWDER, FOR SUSPENSION ORAL at 13:38

## 2023-03-13 NOTE — MBS/VFSS/FEES
Outpatient Speech Language Pathology   Adult Swallow Initial Evaluation  [facility]     Patient Name: Jose Hurtado  : 1950  MRN: [id number]  Today's Date: 3/13/2023         Visit Date: 2023   Patient Active Problem List   Diagnosis   • History of esophageal cancer   • Adenomatous polyp of colon   • Malignant neoplasm of prostate (HCC)   • RLS (restless legs syndrome)   • History of DVT (deep vein thrombosis)   • Recurrent major depressive disorder, in partial remission (HCC)   • Hypertension   • Anemia   • Insomnia due to other mental disorder   • Peripheral polyneuropathy   • B12 deficiency   • Lymphedema   • Iron deficiency   • Gastroparesis   • Acute deep vein thrombosis (DVT) of popliteal vein of left lower extremity (HCC)   • Tremor of both hands   • Gastrointestinal hemorrhage   • Acute blood loss anemia   • Pancytopenia (HCC)   • Chronic venous hypertension due to DVT   • Bleeding hemorrhoid   • Malabsorption of iron   • Iron deficiency anemia   • History of esophageal cancer   • Abnormal CT scan   • Generalized weakness   • Chronic anticoagulation   • CKD (chronic kidney disease) stage 2, GFR 60-89 ml/min   • Dysphagia   • NSTEMI (non-ST elevated myocardial infarction) (HCC)   • Bronchitis   • Dyspnea   • Elevated troponin   • Atrial fibrillation (HCC)   • Chronic obstructive pulmonary disease (HCC)   • Congestive heart failure (HCC)        Past Medical History:   Diagnosis Date   • Acute deep vein thrombosis (DVT) of popliteal vein of left lower extremity (HCC) 2020   • Anemia    • Anti-phospholipid syndrome (HCC)     On lifelong anticoagulation therapy   • Bladder disorder     LEAKAGE   ON MED  wers pads   • Bleeding hemorrhoids    • Chronic kidney disease    • Community acquired pneumonia     HISTORY OF IN    • Congestive heart failure (HCC)    • COPD (chronic obstructive pulmonary disease) (HCC)    • Deep venous thrombosis (HCC) ,     Left lower extremity multiple   •  Depression    • Esophageal carcinoma (HCC) 12/31/2014    had chemo and radiation prior surgery   • Hemorrhoids    • HH (hiatus hernia)    • History of atrial fibrillation 2015    ONE EPISODE WHILE HOSPITALIZED   • History of kidney stones    • History of nephrolithiasis    • History of pancreatitis     PT STATES MANY YEARS AGO   • History of radiation therapy    • History of transfusion    • Hypertension    • Long-term (current) use of anticoagulants, INR goal 2.0-3.0    • Lymphedema    • Malignant neoplasm of prostate (HCC)    • Other hyperlipidemia 01/30/2018 January 30, 2018 lipid panel risk 12.8%   • Restless legs syndrome    • Sleep apnea     OCCASIONALLY WEARS CPAP   • Squamous carcinoma     on the head        Past Surgical History:   Procedure Laterality Date   • APPENDECTOMY  1950   • BRONCHOSCOPY      (Diagnostic)   • CARDIAC CATHETERIZATION N/A 5/14/2022    Procedure: Left Heart Cath;  Surgeon: Eric So MD;  Location: [facility] CATH INVASIVE LOCATION;  Service: Cardiology;  Laterality: N/A;   • CARDIAC CATHETERIZATION N/A 5/14/2022    Procedure: Coronary angiography;  Surgeon: Eric So MD;  Location: [facility] CATH INVASIVE LOCATION;  Service: Cardiology;  Laterality: N/A;   • CARDIAC CATHETERIZATION N/A 5/14/2022    Procedure: Left ventriculography;  Surgeon: Eric So MD;  Location: [facility] CATH INVASIVE LOCATION;  Service: Cardiology;  Laterality: N/A;   • CATARACT EXTRACTION Bilateral 2014   • COLONOSCOPY  12/15/2014    Complete / Description: EH, IH, torts, stool, follow-up colonoscopy due in 5 years.   • COLONOSCOPY N/A 6/13/2017    non-thrombosed external hemorrhoids, normal examined ileum, IH   • COLONOSCOPY N/A 2/26/2019    Procedure: COLONOSCOPY with Cold Polypectomy;  Surgeon: Mulugeta Millan MD;  Location: [facility] ENDOSCOPY;  Service: Gastroenterology   • COLONOSCOPY N/A 12/16/2020    Procedure: COLONOSCOPY to cecum into TI;  Surgeon: Mesha Sanchez,  MD;  Location: [facility] ENDOSCOPY;  Service: Gastroenterology;  Laterality: N/A;  pre: lower GI bleed  post: hemorrhoids    • CYSTOSCOPY W/ LASER LITHOTRIPSY     • ENDOSCOPY N/A 6/13/2017    Procedure: ESOPHAGOGASTRODUODENOSCOPY WITH COLD BIOPSY;  Surgeon: Lake Gonzalez MD;  Location: [facility] ENDOSCOPY;  Service:    • ENDOSCOPY N/A 11/18/2021    Procedure: ESOPHAGOGASTRODUODENOSCOPY WITH  DILATATION WITH FLUOROSCOPY;  Surgeon: Jose Christine III, MD;  Location: [facility] ENDOSCOPY;  Service: Gastroenterology;  Laterality: N/A;  Pre: dysphagia, h/x of adinocarcinoma of esophagus  Post: same   • ESOPHAGECTOMY      April 2015, stage IIB esophageal carcinoma, sub-total resection.   • ESOPHAGECTOMY      Esophagectomy Subtotal [place] Procedure   • EXCISION LESION  08/2012    Removal of Squamous Cell CA on Head   • HAMMER TOE REPAIR  09/2014    Hammertoe Operation (Each Toe), 10/2014   • HAMMER TOE REPAIR Left 10/3/2017    Procedure: Left second third and fourth distal interphalangeal joint resection with flexor tenotomy;  Surgeon: Mulugeta Lira MD;  Location: [facility] OR OSC;  Service:    • HEMORRHOIDECTOMY N/A 12/23/2020    Procedure: HEMORRHOIDECTOMY;  Surgeon: Billy Julio Jr., MD;  Location: [facility] OR;  Service: General;  Laterality: N/A;   • HERNIA REPAIR      incisional   • JEJUNOSTOMY      Laparoscopic   • JEJUNOSTOMY      tube removal    • KNEE SURGERY Bilateral 1967, 1973, 1981   • PATELLA SURGERY Left     removed   • PILONIDAL CYST / SINUS EXCISION     • CA ARTHRP KNE CONDYLE&PLATU MEDIAL&LAT COMPARTMENTS Right 3/26/2018    Procedure: TOTAL KNEE ARTHROPLASTY;  Surgeon: Renny Solis MD;  Location: [facility] MAIN OR;  Service: Orthopedics   • PROSTATECTOMY  2010   • PYLOROPLASTY     • SPINAL FUSION  02/1998    C 5,6   • TONSILLECTOMY     • UPPER GASTROINTESTINAL ENDOSCOPY  12/15/2014    LA Grade D esophagitis, Pardo's, HH, multiple duodenal ulcers   • VENTRAL/INCISIONAL HERNIA REPAIR N/A  4/14/2016    Procedure: VENTRAL/INCISIONAL HERNIA REPAIR, open, with mesh, and component separation;  Surgeon: Darren Rivas MD;  Location: [facility] MAIN OR;  Service:          Visit Dx:     ICD-10-CM ICD-9-CM   1. Oropharyngeal dysphagia  R13.12 787.22   2. Dysphagia, unspecified type  R13.10 787.20            OP SLP Assessment/Plan - 03/13/23 1427        SLP Assessment    Functional Problems Swallowing  -KA    Impact on Function: Swallowing Risk of aspiration;Risk of pneumonia  -KA    Clinical Impression: Swallowing Mild-Moderate:;oropharyngeal phase dysphagia  -KA    Please refer to paper survey for additional self-reported information Yes  -KA    Please refer to items scanned into chart for additional diagnostic informaiton and handouts as provided by clinician Yes  -KA    Prognosis Good (comment)  -KA    Patient/caregiver participated in establishment of treatment plan and goals Yes  -KA    Patient would benefit from skilled therapy intervention Yes  -KA       SLP Plan    Frequency x1 a week  -KA    Duration 4 to 8 weeks  -KA    Planned CPT's? SLP SWALLOW THERAPY: 95457  -KA    Expected Duration of Therapy Session (SLP Eval) 45  -KA          User Key  (r) = Recorded By, (t) = Taken By, (c) = Cosigned By    Initials Name Provider Type    KA Roman Stearns MA,CCC-SLP Speech and Language Pathologist                 SLP Adult Swallow Evaluation     Row Name 03/13/23 1400       Rehab Evaluation    Document Type evaluation  -KA    Subjective Information no complaints  -KA    Patient Observations alert;cooperative  -KA    Patient Effort good  -KA    Symptoms Noted During/After Treatment none  -KA       General Information    Patient Profile Reviewed yes  -KA    Pertinent History Of Current Problem VFSScompleted to r/o aspiration due to patient recent referral for OP dysphagia therapy for apsiration PNA. Patient with recent hospitalization with RSV and possible aspiration PNA, no ST consult during  hospitalization. See 2/23 evaluation for detail. Hx of esphagectomy in 2014 and spinal fusion. VFSS 5 years ago revealed mild oropharyngeal dysphagia.  -KA    Current Method of Nutrition regular textures;thin liquids  -KA    Precautions/Limitations, Vision WFL;for purposes of eval  -KA    Precautions/Limitations, Hearing WFL;for purposes of eval  -KA    Prior Level of Function-Communication WFL  -KA    Prior Level of Function-Swallowing no diet consistency restrictions  -KA    Plans/Goals Discussed with patient  -KA    Barriers to Rehab none identified  -KA       MBS/VFSS    Utensils Used spoon;cup  -KA    Consistencies Trialed soft to chew textures;chopped;whole;mixed consistency;thin liquids;nectar/syrup-thick liquids  -KA       MBS/VFSS Interpretation    VFSS Summary Radiologist Dr Luis present: Patient demonstrates mild to moderate oropharyngeal dysphagia characterized by inconsistent decreased epiglottic deflection, BOT retraction, pharyngeal constriction, mistiming and airway protection. No penetration initially with single cup sip of thins. Trace penetration during the swallow on second sip during repetitive sips. No penetration with puree and inconsistent penetration with mechanical soft mixed consistency. Patient demonstrated mild to moderate vallaculae residue with puree and mechanical soft solids. Cued multiple swallows not very effective with clearing. Initially no penetration with thin liquids when used as liquid wash, however as thin liquid wash trials progressed after solid trials to clear residue, increase in penetration and depth deeper as well with laryngeal vestbule residue. All penetration is silent. Cued coughs strong with expeling laryngeal vestibule residue. Penetration with NTL with laryngeal vestibule residue after 3rd sip, cued cough assist with clearing residue.  -KA       SLP Communication to Radiology    Summary Statement Radiologist Dr Luis present: Patient demonstrates mild to  moderate oropharyngeal dysphagia characterized by inconsistent decreased epiglottic deflection, BOT retraction, pharyngeal constriction, mistiming and airway protection. No penetration initially with single cup sip of thins. Trace penetration during the swallow on second sip during repetitive sips. No penetration with puree and inconsistent penetration with mechanical soft mixed consistency. Patient demonstrated mild to moderate vallaculae residue with puree and mechanical soft solids. Cued multiple swallows not very effective with clearing. Initially no penetration with thin liquids when used as liquid wash, however as thin liquid wash trials progressed after solid trials to clear residue, increase in penetration and depth deeper as well with laryngeal vestbule residue. All penetration is silent. Cued coughs strong with expeling laryngeal vestibule residue. Penetration with NTL with laryngeal vestibule residue after 3rd sip, cued cough assist with clearing residue.  -KA       SLP Evaluation Clinical Impression    SLP Swallowing Diagnosis mild-moderate;pharyngeal dysphagia;oral dysphagia  -KA    Functional Impact risk of aspiration/pneumonia  -KA    Rehab Potential/Prognosis, Swallowing good, to achieve stated therapy goals  -KA    Swallow Criteria for Skilled Therapeutic Interventions Met demonstrates skilled criteria  -KA       Recommendations    Therapy Frequency (Swallow) 1 day per week  -KA    Predicted Duration Therapy Intervention (Days) 4 weeks  -KA    SLP Diet Recommendation soft to chew textures;whole;thin liquids  -KA    Recommended Precautions and Strategies upright posture during/after eating;small bites of food and sips of liquid;multiple swallows per bite of food;alternate between small bites of food and sips of liquid;volitional throat clear  -KA    Oral Care Recommendations Oral Care BID/PRN  -KA    SLP Rec. for Method of Medication Administration meds whole;with puree  -KA    Monitor for Signs of  Aspiration yes;notify SLP if any concerns  -KA    Anticipated Discharge Disposition (SLP) home  -DEVONTE          User Key  (r) = Recorded By, (t) = Taken By, (c) = Cosigned By    Initials Name Provider Type    Roman Rubio MA,CCC-SLP Speech and Language Pathologist              Discussed importance of swallow strategies including volitional throat clear/cough to assist with clearing laryngeal vestibule residue. Handout provided on strategies and to continue with outpatient dysphagia therapy.                  OP SLP Education     Row Name 03/13/23 1428       Education    Barriers to Learning No barriers identified  -KA    Education Provided Described results of evaluation;Patient expressed understanding of evaluation  -KA    Assessed Learning needs;Learning motivation  -KA    Learning Motivation Strong  -KA    Learning Method Explanation  -KA    Teaching Response Verbalized understanding  -KA    Education Comments Reviewed results including showing images  -DEVONTE          User Key  (r) = Recorded By, (t) = Taken By, (c) = Cosigned By    Initials Name Effective Dates    Roman Rubio MA,CCC-SLP 06/02/22 -                          Time Calculation:   SLP Start Time: 1315  SLP Stop Time: 1430  SLP Time Calculation (min): 75 min  Untimed Charges  SLP Eval/Re-eval : ST Motion Fluoro Eval Swallow - 12770  46454-TI Motion Fluoro Eval Swallow Minutes: 75  Total Minutes  Untimed Charges Total Minutes: 75   Total Minutes: 75    Therapy Charges for Today     Code Description Service Date Service Provider Modifiers Qty    66529407217  ST MOTION FLUORO EVAL SWALLOW 5 3/13/2023 Roman Stearns MA,CCC-SLP GN 1                   Roman Stearns MA,CCC-SLP  3/13/2023

## 2023-03-16 ENCOUNTER — HOSPITAL ENCOUNTER (OUTPATIENT)
Dept: SPEECH THERAPY | Facility: HOSPITAL | Age: 73
Setting detail: THERAPIES SERIES
Discharge: HOME | End: 2023-03-16
Payer: MEDICARE

## 2023-03-16 DIAGNOSIS — R13.12 OROPHARYNGEAL DYSPHAGIA: Primary | ICD-10-CM

## 2023-03-16 PROCEDURE — 92526 ORAL FUNCTION THERAPY: CPT | Performed by: SPEECH-LANGUAGE PATHOLOGIST

## 2023-03-16 NOTE — THERAPY TREATMENT NOTE
Outpatient Speech Language Pathology   Adult Swallow Treatment Note  [facility]     Patient Name: Jose Hurtado  : 1950  MRN: [id number]  Today's Date: 3/16/2023         Visit Date: 2023   Patient Active Problem List   Diagnosis   • History of esophageal cancer   • Adenomatous polyp of colon   • Malignant neoplasm of prostate (HCC)   • RLS (restless legs syndrome)   • History of DVT (deep vein thrombosis)   • Recurrent major depressive disorder, in partial remission (HCC)   • Hypertension   • Anemia   • Insomnia due to other mental disorder   • Peripheral polyneuropathy   • B12 deficiency   • Lymphedema   • Iron deficiency   • Gastroparesis   • Acute deep vein thrombosis (DVT) of popliteal vein of left lower extremity (HCC)   • Tremor of both hands   • Gastrointestinal hemorrhage   • Acute blood loss anemia   • Pancytopenia (HCC)   • Chronic venous hypertension due to DVT   • Bleeding hemorrhoid   • Malabsorption of iron   • Iron deficiency anemia   • History of esophageal cancer   • Abnormal CT scan   • Generalized weakness   • Chronic anticoagulation   • CKD (chronic kidney disease) stage 2, GFR 60-89 ml/min   • Dysphagia   • NSTEMI (non-ST elevated myocardial infarction) (HCC)   • Bronchitis   • Dyspnea   • Elevated troponin   • Atrial fibrillation (HCC)   • Chronic obstructive pulmonary disease (HCC)   • Congestive heart failure (HCC)        Visit Dx:    ICD-10-CM ICD-9-CM   1. Oropharyngeal dysphagia  R13.12 787.22                              SLP OP Goals     Row Name 23 1200          Subjective Comments    Subjective Comments Patient is pleasant and cooperative. Reviewed results of VFSS from 3/13.  -KA        Dysphagia Goals    Patient will increase strength of tongue base and posterior pharyngeal walls to reduce residue that might fall into airway by completing effotful swallow;Shanon (tongue hold);without cues  -KA     Status: Patient will increase strength of tongue base and  posterior pharyngeal walls to reduce residue that might fall into airway by completing New  -KA     Comments: Patient will increase strength of tongue base and posterior pharyngeal walls to reduce residue that might fall into airway by completing Introduced exercises, pt performed x20 each with min cues.  -KA     Patient will compensate for oral/pharyngeal deficits and reduce risks while eating by utilizing  compensatory strategies slow rate;controlled bolus size;liquid wash;multiple swallows;without cues  throat clear/cough after liquids  -KA     Status: Patient will compensate for oral/pharyngeal deficits and reduce risks while eating by utilizing  compensatory strategies New  -KA     Comments: Patient will compensate for oral/pharyngeal deficits and reduce risks while eating by utilizing  compensatory strategies Reviewed importance of safe swallow precautions to reduce risk for pen/asp with handouts provided, pt voiced understanding  -KA           User Key  (r) = Recorded By, (t) = Taken By, (c) = Cosigned By    Initials Name Provider Type    Roman Rubio MA,CCC-SLP Speech and Language Pathologist                      Time Calculation:   SLP Start Time: 1107  SLP Stop Time: 1145  SLP Time Calculation (min): 38 min  Untimed Charges  06736-EF Treatment Swallow Minutes: 38  Total Minutes  Untimed Charges Total Minutes: 38   Total Minutes: 38    Therapy Charges for Today     Code Description Service Date Service Provider Modifiers Qty    83642558189 HC ST TREATMENT SWALLOW 3 3/16/2023 Roman Stearns MA,CCC-SLP GN 1                   Roman Stearns MA,CCC-SLP  3/16/2023

## 2023-03-22 ENCOUNTER — OFFICE VISIT (OUTPATIENT)
Dept: CARDIOLOGY | Facility: CLINIC | Age: 73
End: 2023-03-22
Payer: MEDICARE

## 2023-03-22 VITALS
OXYGEN SATURATION: 98 % | SYSTOLIC BLOOD PRESSURE: 144 MMHG | DIASTOLIC BLOOD PRESSURE: 78 MMHG | WEIGHT: 205.8 LBS | HEIGHT: 77 IN | HEART RATE: 67 BPM | BODY MASS INDEX: 24.3 KG/M2

## 2023-03-22 DIAGNOSIS — I49.3 PVC'S (PREMATURE VENTRICULAR CONTRACTIONS): ICD-10-CM

## 2023-03-22 DIAGNOSIS — I48.0 PAF (PAROXYSMAL ATRIAL FIBRILLATION): Primary | ICD-10-CM

## 2023-03-22 DIAGNOSIS — I50.32 CHRONIC DIASTOLIC CONGESTIVE HEART FAILURE: ICD-10-CM

## 2023-03-22 DIAGNOSIS — N18.32 STAGE 3B CHRONIC KIDNEY DISEASE: ICD-10-CM

## 2023-03-22 DIAGNOSIS — I10 PRIMARY HYPERTENSION: ICD-10-CM

## 2023-03-22 PROCEDURE — 1160F RVW MEDS BY RX/DR IN RCRD: CPT | Performed by: INTERNAL MEDICINE

## 2023-03-22 PROCEDURE — 99214 OFFICE O/P EST MOD 30 MIN: CPT | Performed by: INTERNAL MEDICINE

## 2023-03-22 PROCEDURE — 3077F SYST BP >= 140 MM HG: CPT | Performed by: INTERNAL MEDICINE

## 2023-03-22 PROCEDURE — 3078F DIAST BP <80 MM HG: CPT | Performed by: INTERNAL MEDICINE

## 2023-03-22 PROCEDURE — 1159F MED LIST DOCD IN RCRD: CPT | Performed by: INTERNAL MEDICINE

## 2023-03-22 NOTE — PROGRESS NOTES
[facility]      Patient Name: Jose Hurtado  :1950  Age: 73 y.o.  Encounter Provider:  Darian Maldonado Jr, MD      Chief Complaint: Follow-up PAF, lymphedema, recurrent DVT      HPI  Jose Hurtado is a 73 y.o. male past medical history of hypertension, dyslipidemia, PAF, CKD, recurrent DVT and COPD who presents for routine evaluation.  Patient was previously followed by Dr. Cyr and I have reviewed all pertinent electronic health records.  He was hospitalized in  with chest tightness and elevated troponin.  Left heart catheterization showed normal coronary arteries.  Echocardiogram showed preserved left ventricular ejection fraction with no significant valvular heart disease.  He has history of chronic lymphedema and is seen in the wound care clinic.  He has a history of atrial fibrillation and is currently on metoprolol and Eliquis.  He has been doing fairly well since last visit with no complaints.  Increasing activity as tolerated.  Mild elevation of blood pressure today but seems better controlled at home.  Heart rate is well controlled today in clinic.  No cardiac complaints at time of interview.  Social family history was reviewed and is not pertinent to this clinic visit.      The following portions of the patient's history were reviewed and updated as appropriate: allergies, current medications, past family history, past medical history, past social history, past surgical history and problem list.      Review of Systems   Constitutional: Negative for chills and fever.   HENT: Negative for hoarse voice and sore throat.    Eyes: Negative for double vision and photophobia.   Cardiovascular: Negative for chest pain, leg swelling, near-syncope, orthopnea, palpitations, paroxysmal nocturnal dyspnea and syncope.   Respiratory: Negative for cough and wheezing.    Skin: Negative for poor wound healing and rash.   Musculoskeletal: Negative for arthritis and joint swelling.  "  Gastrointestinal: Negative for bloating, abdominal pain, hematemesis and hematochezia.   Neurological: Negative for dizziness and focal weakness.   Psychiatric/Behavioral: Negative for depression and suicidal ideas.       OBJECTIVE:   Vital Signs  Vitals:    03/22/23 1254   BP: 144/78   Pulse: 67   SpO2: 98%     Estimated body mass index is 24.4 kg/m² as calculated from the following:    Height as of this encounter: 195.6 cm (77\").    Weight as of this encounter: 93.4 kg (205 lb 12.8 oz).    Vitals reviewed.   Constitutional:       Appearance: Healthy appearance. Not in distress.   Neck:      Vascular: No JVR. JVD normal.   Pulmonary:      Effort: Pulmonary effort is normal.      Breath sounds: Normal breath sounds. No wheezing. No rhonchi. No rales.   Chest:      Chest wall: Not tender to palpatation.   Cardiovascular:      PMI at left midclavicular line. Normal rate. Regular rhythm. Normal S1. Normal S2.      Murmurs: There is no murmur.      No gallop. No click. No rub.   Pulses:     Intact distal pulses.   Edema:     Peripheral edema absent.   Abdominal:      General: Bowel sounds are normal.      Palpations: Abdomen is soft.      Tenderness: There is no abdominal tenderness.   Musculoskeletal: Normal range of motion.         General: No tenderness. Skin:     General: Skin is warm and dry.   Neurological:      General: No focal deficit present.      Mental Status: Alert and oriented to person, place and time.         Procedures    Lipid Panel    Lipid Panel 6/2/22   Total Cholesterol 169   Triglycerides 53   HDL Cholesterol 60   VLDL Cholesterol 11   LDL Cholesterol  98   LDL/HDL Ratio 1.64              BUN   Date Value Ref Range Status   01/04/2023 22 8 - 23 mg/dL Final   04/16/2018 24 (H) 7 - 20 mg/dL Final     Creatinine   Date Value Ref Range Status   01/04/2023 1.44 (H) 0.76 - 1.27 mg/dL Final   03/22/2022 1.50 (H) 0.60 - 1.30 mg/dL Final     Comment:     Serial Number: [id number]:  [id number] "   04/16/2018 1.0 0.7 - 1.5 mg/dL Final     Potassium   Date Value Ref Range Status   01/04/2023 4.5 3.5 - 5.2 mmol/L Final   04/16/2018 3.9 3.5 - 5.1 mmol/L Final     ALT (SGPT)   Date Value Ref Range Status   01/02/2023 18 1 - 41 U/L Final   04/16/2018 27 13 - 69 U/L Final     AST (SGOT)   Date Value Ref Range Status   01/02/2023 25 1 - 40 U/L Final   04/16/2018 26 15 - 46 U/L Final           ASSESSMENT:     73-year-old male with past medical history of chronic lymphedema, history of left extremity DVT, esophageal cancer status post gastrectomy with chemotherapy and radiation, hypertension, dyslipidemia, COPD, PVCs and PAF who presents for routine follow-up    PLAN OF CARE:     1. PAF -asymptomatic.  Continue beta-blocker and Eliquis.  No bleeding complication on anticoagulation.  2. History of DVT -remains on Eliquis  3. Esophageal cancer status post chemo and radiation  4. Hypertension -slightly elevated today in clinic.  Sodium restricted diet.  Twice daily blood pressure log.  5. Lymphedema -chronic and stable  6. Mixed hyperlipidemia    Return to clinic 6 months             Discharge Medications          Accurate as of March 22, 2023 12:56 PM. If you have any questions, ask your nurse or doctor.            Continue These Medications      Instructions Start Date   albuterol sulfate  (90 Base) MCG/ACT inhaler  Commonly known as: PROVENTIL HFA;VENTOLIN HFA;PROAIR HFA   1 puff, Inhalation, Every 4 Hours PRN      atorvastatin 40 MG tablet  Commonly known as: LIPITOR   40 mg, Oral, Nightly      cyanocobalamin 1000 MCG/ML injection   No dose, route, or frequency recorded.      Eliquis 5 MG tablet tablet  Generic drug: apixaban   TAKE ONE TABLET BY MOUTH EVERY 12 HOURS      furosemide 40 MG tablet  Commonly known as: LASIX   40 mg, Oral, 2 Times Daily      Gemtesa 75 MG tablet  Generic drug: Vibegron   Daily      metoprolol tartrate 50 MG tablet  Commonly known as: LOPRESSOR   50 mg, Oral, 2 Times Daily       pantoprazole 40 MG EC tablet  Commonly known as: PROTONIX   TAKE ONE TABLET BY MOUTH TWICE A DAY BEFORE A MEAL      PARoxetine 40 MG tablet  Commonly known as: PAXIL   TAKE ONE TABLET BY MOUTH EVERY MORNING      potassium chloride 10 MEQ CR capsule  Commonly known as: MICRO-K   10 mEq, Oral, Daily      pramipexole 1.5 MG tablet  Commonly known as: MIRAPEX   TAKE ONE TABLET BY MOUTH TWICE A DAY      Spiriva Respimat 2.5 MCG/ACT aerosol solution inhaler  Generic drug: tiotropium bromide monohydrate   2 puffs, Inhalation, Daily             Thank you for allowing me to participate in the care of your patient,      Sincerely,   Darian Maldonado MD  [facility]  03/22/23  12:56 EDT

## 2023-03-23 ENCOUNTER — APPOINTMENT (OUTPATIENT)
Dept: SPEECH THERAPY | Facility: HOSPITAL | Age: 73
End: 2023-03-23
Payer: MEDICARE

## 2023-03-28 ENCOUNTER — TELEPHONE (OUTPATIENT)
Dept: INTERNAL MEDICINE | Age: 73
End: 2023-03-28
Payer: MEDICARE

## 2023-03-28 DIAGNOSIS — R41.82 ALTERED MENTAL STATUS, UNSPECIFIED ALTERED MENTAL STATUS TYPE: Primary | ICD-10-CM

## 2023-03-28 NOTE — TELEPHONE ENCOUNTER
"Patient called and said that he thinks he needs to see a physiatrist due to mental changes. I asked him to explain and he said that he doesn't have the ability to concentrate, he will start on projects and not finish. He said that its disturbing to him.  He said he is having a little bit of depression \"but not much.\"  "

## 2023-03-28 NOTE — H&P
"Foot Follow Up        Patient: Jose Hurtado     YOB: 1950 67 y.o. male     Chief Complaints: My toes hurt     History of Present Illness: Patient has a history of bilateral hammertoes done elsewhere in 2014. I had  seen him for this prior to that.  He apologized profusely to me today for going elsewhere and told him I completely understood.  He now has persistent complaints of moderate pain over the  left second third and fourth toes with a rub in his shoes.  He really has no significant complaints of pain at the PIP joints.  he's not had any ulceration or breakdown.  He has only mild complaints on the right  HPI     ROS: Foot pain no numbness or tingling nonantalgic gait.     Medical History         Past Medical History:   Diagnosis Date   • Anti-phospholipid syndrome       On lifelong anticoagulation therapy   • Atrial fibrillation       HAD IT FOR 3 HR AFTER SX ON ESOPHAGUS  AND CONVERTED BACK ON OWN   • Benign prostatic hypertrophy     • Bladder disorder       LEAKAGE   ON MED   • Clotting disorder     • Community acquired pneumonia     • Cough       Seeing pulmonary medicine February 2016   • Deep venous thrombosis 2008 2006   • Esophageal carcinoma 12/31/2014     had chemo and radiation prior surgery   • Esophageal reflux     • Fatigue     • GERD (gastroesophageal reflux disease)     • Hemorrhoids     • Hypertension     • Long-term (current) use of anticoagulants, INR goal 2.0-3.0     • Malignant neoplasm of prostate     • Pneumonia     • Radiation     • Restless legs syndrome     • 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  There was really no focal pain or significant deformity at the PIP joint third second through fourth toes. " Received teacher version of [place] screening scale from Stacey Ferrari.  Teacher did not see any evidence of concerns of inattentiveness, hyperactivity, impulsivity or oppositional behaviors.  Described her as being motivated and wanting to succeed.  Stated she did seem a little less focused only the past couple of weeks      There was malleting of the second and third and some to the fourth toe at the DIP joint that was not passively correctable to neutral.  Palpable dorsalis pedis pulse with brisk capillary refill to the toes.  Right foot showed minimal flexion deformity of the second and third more so than fourth toes but no callus.        Radiology: 3 views of both feet were ordered today to evaluate pain and reviewed these were compared with x-rays from 2014.  There appears to been fusion at the second third and fourth PIP joints with minimal deformity.  There is flexion deformity of the DIP joints of the second third and fourth toes.     Right foot shows previous surgery the second third and fourth PIP joints with less flexion deformity at the DIP joints           Assessment/Plan:  1.  Left foot second third and fourth mallet toes after hammertoe correction with PIP fusions.  2.  Right foot previous hammertoe correction with slight flexion deformity at the DIP joints     We discussed treatment options right foot is not terribly bothersome.  Left foot is and although no guarantees could be given patient like to proceed with operative treatment after discussion of operative and nonoperative treatment options.     I don't think we need to do anything with this PIP joints as there is only minimal deformity there and no pain.  I would recommend second third and fourth toe DIP joint resection and flexor tenotomies to get these in a more neutral alignment and prevent the pain is having over the tips of his toes and to prevent ulceration or callus.     I reviewed the procedure with him in detail with associated risks benefits Center outcomes and complications which can include but are not limited to heart attack stroke death pneumonia infection bleeding damage to blood vessels and nerves or tendons blood clots pulmonary embolism persistent or worsening pain stiffness recurrence of deformity and failure to return to presurgery and  precondition levels of activity as well as loss of the toes.     He understands he will have pins coming out of the tips of his toes for at least 3-4 weeks and will be limited in weightbearing during that time.     All of his questions were answered to his full satisfaction and we will schedule this on an outpatient basis at a mutually convenient time.     He is going to check with his PCP Dr. Mooney about bridging him off of his Coumadin prior to surgery.

## 2023-03-29 ENCOUNTER — HOSPITAL ENCOUNTER (OUTPATIENT)
Dept: CT IMAGING | Facility: HOSPITAL | Age: 73
Discharge: HOME | End: 2023-03-29
Admitting: NURSE PRACTITIONER
Payer: MEDICARE

## 2023-03-29 DIAGNOSIS — C15.9 ADENOCARCINOMA OF ESOPHAGUS: ICD-10-CM

## 2023-03-29 LAB — CREAT BLDA-MCNC: 1.5 MG/DL (ref 0.6–1.3)

## 2023-03-29 PROCEDURE — 71260 CT THORAX DX C+: CPT

## 2023-03-29 PROCEDURE — 74160 CT ABDOMEN W/CONTRAST: CPT

## 2023-03-29 PROCEDURE — 25510000001 IOPAMIDOL 61 % SOLUTION: Performed by: NURSE PRACTITIONER

## 2023-03-29 PROCEDURE — 82565 ASSAY OF CREATININE: CPT

## 2023-03-29 RX ADMIN — IOPAMIDOL 85 ML: 612 INJECTION, SOLUTION INTRAVENOUS at 11:20

## 2023-03-30 ENCOUNTER — HOSPITAL ENCOUNTER (OUTPATIENT)
Dept: SPEECH THERAPY | Facility: HOSPITAL | Age: 73
Setting detail: THERAPIES SERIES
Discharge: HOME | End: 2023-03-30
Payer: MEDICARE

## 2023-03-30 DIAGNOSIS — R13.12 OROPHARYNGEAL DYSPHAGIA: Primary | ICD-10-CM

## 2023-03-30 PROCEDURE — 92526 ORAL FUNCTION THERAPY: CPT | Performed by: SPEECH-LANGUAGE PATHOLOGIST

## 2023-03-30 NOTE — THERAPY TREATMENT NOTE
Outpatient Speech Language Pathology   Adult Swallow Treatment Note  [facility]     Patient Name: Jose Hurtado  : 1950  MRN: [id number]  Today's Date: 3/30/2023         Visit Date: 2023   Patient Active Problem List   Diagnosis   • History of esophageal cancer   • Adenomatous polyp of colon   • Malignant neoplasm of prostate (HCC)   • RLS (restless legs syndrome)   • History of DVT (deep vein thrombosis)   • Recurrent major depressive disorder, in partial remission (HCC)   • Hypertension   • Anemia   • Insomnia due to other mental disorder   • Peripheral polyneuropathy   • B12 deficiency   • Lymphedema   • Iron deficiency   • Gastroparesis   • Acute deep vein thrombosis (DVT) of popliteal vein of left lower extremity (HCC)   • Tremor of both hands   • Gastrointestinal hemorrhage   • Acute blood loss anemia   • Pancytopenia (HCC)   • Chronic venous hypertension due to DVT   • Bleeding hemorrhoid   • Malabsorption of iron   • Iron deficiency anemia   • History of esophageal cancer   • Abnormal CT scan   • Generalized weakness   • Chronic anticoagulation   • CKD (chronic kidney disease) stage 2, GFR 60-89 ml/min   • Dysphagia   • NSTEMI (non-ST elevated myocardial infarction) (HCC)   • Bronchitis   • Dyspnea   • Elevated troponin   • Atrial fibrillation (HCC)   • Chronic obstructive pulmonary disease (HCC)   • Congestive heart failure (HCC)        Visit Dx:    ICD-10-CM ICD-9-CM   1. Oropharyngeal dysphagia  R13.12 787.22                              SLP OP Goals     Row Name 23 1600          Subjective Comments    Subjective Comments Patient is pleasant and cooperative, arrived late to therapy session.  -KA        Subjective Pain    Able to rate subjective pain? yes  -KA     Pre-Treatment Pain Level 0  -KA     Post-Treatment Pain Level 0  -KA        Dysphagia Goals    Dysphagia STG's Patient will increase superior/anterior movement of hyolaryngeal complex to reduce residue which may fall into  airway by using the Expiratory Muscle Strength Trainer  -KA     Patient will increase laryngeal elevation to reduce residue that might fall into airway by completing super-supraglottic swallow;without cues  -KA     Status: Patient will increase laryngeal elevation to reduce residue that might fall into airway by completing New  -KA     Comments: Patient will increase laryngeal elevation to reduce residue that might fall into airway by completing Introduced this exercise today patient completed x10 with min cues.  -KA     Patient will increase strength of tongue base and posterior pharyngeal walls to reduce residue that might fall into airway by completing effotful swallow;Shanon (tongue hold);without cues  -KA     Status: Patient will increase strength of tongue base and posterior pharyngeal walls to reduce residue that might fall into airway by completing Progressing as expected  -KA     Comments: Patient will increase strength of tongue base and posterior pharyngeal walls to reduce residue that might fall into airway by completing Patient completed x10 with min cues  -KA     Status: Patient will increase superior/anterior movement of hyolaryngeal complex to reduce residue which may fall into airway by using the Expiratory Muscle Strength Trainer New  -KA     Comments: Patient will increase superior/anterior movement of hyolaryngeal complex to reduce residue which may fall into airway by using the Expiratory Muscle Strength Trainer Patient was educated on EMST and benefits and pt plans to purchase.  -KA     Patient will compensate for oral/pharyngeal deficits and reduce risks while eating by utilizing  compensatory strategies slow rate;controlled bolus size;liquid wash;multiple swallows;without cues  -KA     Status: Patient will compensate for oral/pharyngeal deficits and reduce risks while eating by utilizing  compensatory strategies Progressing as expected  -KA     Comments: Patient will compensate for  oral/pharyngeal deficits and reduce risks while eating by utilizing  compensatory strategies Reviewed importance of safe swallow precautions to reduce risk for pen/asp with handouts provided, pt voiced understanding  -DEVONTE           User Key  (r) = Recorded By, (t) = Taken By, (c) = Cosigned By    Initials Name Provider Type    Roman Rubio MA,CCC-SLP Speech and Language Pathologist                      Time Calculation:   SLP Start Time: 1312  SLP Stop Time: 1350  SLP Time Calculation (min): 38 min  Untimed Charges  41669-EN Treatment Swallow Minutes: 38  Total Minutes  Untimed Charges Total Minutes: 38   Total Minutes: 38    Therapy Charges for Today     Code Description Service Date Service Provider Modifiers Qty    [id number]  ST TREATMENT SWALLOW 3 3/30/2023 Roman Stearns MA,CCC-SLP GN 1                   Roman Stearns MA,CCC-SLP  3/30/2023

## 2023-03-31 ENCOUNTER — TELEPHONE (OUTPATIENT)
Dept: SURGERY | Facility: CLINIC | Age: 73
End: 2023-03-31
Payer: MEDICARE

## 2023-03-31 NOTE — TELEPHONE ENCOUNTER
Left voicemail to confirm upcoming appointment with Dr Capone on 4/4 @ 1:00pm. [address] [facility] [address]

## 2023-04-04 ENCOUNTER — OFFICE VISIT (OUTPATIENT)
Dept: SURGERY | Facility: CLINIC | Age: 73
End: 2023-04-04
Payer: MEDICARE

## 2023-04-04 VITALS
DIASTOLIC BLOOD PRESSURE: 54 MMHG | HEART RATE: 52 BPM | WEIGHT: 205 LBS | SYSTOLIC BLOOD PRESSURE: 120 MMHG | BODY MASS INDEX: 24.21 KG/M2 | HEIGHT: 77 IN | OXYGEN SATURATION: 97 %

## 2023-04-04 DIAGNOSIS — C15.9 ADENOCARCINOMA OF ESOPHAGUS: Primary | ICD-10-CM

## 2023-04-04 PROCEDURE — 99213 OFFICE O/P EST LOW 20 MIN: CPT | Performed by: THORACIC SURGERY (CARDIOTHORACIC VASCULAR SURGERY)

## 2023-04-04 PROCEDURE — 1159F MED LIST DOCD IN RCRD: CPT | Performed by: THORACIC SURGERY (CARDIOTHORACIC VASCULAR SURGERY)

## 2023-04-04 PROCEDURE — 3074F SYST BP LT 130 MM HG: CPT | Performed by: THORACIC SURGERY (CARDIOTHORACIC VASCULAR SURGERY)

## 2023-04-04 PROCEDURE — 3078F DIAST BP <80 MM HG: CPT | Performed by: THORACIC SURGERY (CARDIOTHORACIC VASCULAR SURGERY)

## 2023-04-04 PROCEDURE — 1160F RVW MEDS BY RX/DR IN RCRD: CPT | Performed by: THORACIC SURGERY (CARDIOTHORACIC VASCULAR SURGERY)

## 2023-04-04 NOTE — PROGRESS NOTES
"Chief Complaint  Esophageal cancer    Subjective        Jose Hurtado presents to [facility] THORACIC SURGERY  History of Present Illness  The patient reports he has a history of chronic obstructive pulmonary disease, congested heart failure, kidney disease, incontinence, swallowing issues, gait disturbance, and hearing impairment. He adds beside that he is doing good. He reports he is seeing a speech therapist, and he is completing some swallowing exercises. He states he is not getting a complete swallowing process. He reports he experiences fairly amount of leakage, especially when he drinks liquids. He adds they are trying to work on his vocal cord and epiglottis folds over. He states it is mainly swollen. He describes he takes 10 hard deep swallows, then he takes a deep breath, and coughs twice and swallows again.  He reports every now and then he will retch. He states it happens 6 to 8 hours after he has eaten.  Objective   Vital Signs:  /54 (BP Location: Left arm, Patient Position: Sitting, Cuff Size: Adult)   Pulse 52   Ht 195.6 cm (77\")   Wt 93 kg (205 lb)   SpO2 97%   BMI 24.31 kg/m²   Estimated body mass index is 24.31 kg/m² as calculated from the following:    Height as of this encounter: 195.6 cm (77\").    Weight as of this encounter: 93 kg (205 lb).       BMI is within normal parameters. No other follow-up for BMI required.      Physical Exam  Vitals and nursing note reviewed.   Constitutional:       Appearance: He is well-developed.   HENT:      Head: Normocephalic and atraumatic.      Nose: Nose normal.   Eyes:      Conjunctiva/sclera: Conjunctivae normal.   Pulmonary:      Effort: Pulmonary effort is normal.   Musculoskeletal:         General: Normal range of motion.      Cervical back: Normal range of motion and neck supple.   Skin:     General: Skin is warm and dry.   Neurological:      Mental Status: He is alert and oriented to person, place, and time.   Psychiatric:  "        Behavior: Behavior normal.         Thought Content: Thought content normal.         Judgment: Judgment normal.        Result Review :        Result Review   I have independently reviewed the CT angiogram of the chest performed on 01/03/2023, which demonstrates scarring consistent with his prior esophagectomy. No new nodules. No mediastinal or hilar lymphadenopathy. No pleural pericardial effusion.    I have reviewed the video swallow performed on 03/13/2023, which demonstrates laryngeal penetration with clearing with cough.    I have independently reviewed the CT of the chest, abdomen, and pelvis performed on 03/29/2023, which demonstrates scarring consistent with his prior esophagectomy. No new nodules, no mediastinal or hilar lymphadenopathy, no pleural pericardial effusion.           Assessment and Plan   There are no diagnoses linked to this encounter.  Assessment & Plan   Mr. Jose Hurtado is a pleasant 73-year-old gentleman status post esophagectomy in 2015. His CT of the chest does not demonstrate any evidence of recurrent disease and he will plan to see us in 1 year.  He will continue to engage with speech therapy to help with his dysphagia.           I spent 20 minutes caring for Jose on this date of service. This time includes time spent by me in the following activities:preparing for the visit, reviewing tests, obtaining and/or reviewing a separately obtained history, performing a medically appropriate examination and/or evaluation , counseling and educating the patient/family/caregiver, ordering medications, tests, or procedures, documenting information in the medical record and independently interpreting results and communicating that information with the patient/family/caregiver  Follow Up   No follow-ups on file.  Patient was given instructions and counseling regarding his condition or for health maintenance advice. Please see specific information pulled into the AVS if appropriate.        Transcribed from ambient dictation for Katarina Capone MD by Rema Pleitez.  04/04/23   14:55 EDT    Patient or patient representative verbalized consent to the visit recording.  I have personally performed the services described in this document as transcribed by the above individual, and it is both accurate and complete.

## 2023-04-04 NOTE — LETTER
April 14, 2023     Brandin Bolton MD  [address]  [facility] [address]  [facility] [address]    Patient: Jose Hurtado   YOB: 1950   Date of Visit: 4/4/2023       Dear Brandin Bolton MD,    Jose Hurtado was in my office today. Below are the relevant portions of my assessment and plan of care.           If you have questions, please do not hesitate to call me. I look forward to following Jose along with you.         Sincerely,        Katarina Capone MD        CC: MD George Carson II, MD Rudolph F Licandro, MD Robert W. Linker III, MD Seth Willard MD Subin Jain, MD Robert M Lindner, MD

## 2023-04-11 DIAGNOSIS — E53.8 B12 DEFICIENCY: Primary | ICD-10-CM

## 2023-04-11 RX ORDER — CYANOCOBALAMIN 1000 UG/ML
INJECTION, SOLUTION INTRAMUSCULAR; SUBCUTANEOUS
Qty: 1 ML | Refills: 11 | Status: SHIPPED | OUTPATIENT
Start: 2023-04-11

## 2023-04-20 ENCOUNTER — HOSPITAL ENCOUNTER (OUTPATIENT)
Dept: SPEECH THERAPY | Facility: HOSPITAL | Age: 73
Setting detail: THERAPIES SERIES
Discharge: HOME | End: 2023-04-20
Payer: MEDICARE

## 2023-04-20 DIAGNOSIS — R13.12 OROPHARYNGEAL DYSPHAGIA: Primary | ICD-10-CM

## 2023-04-20 PROCEDURE — 92526 ORAL FUNCTION THERAPY: CPT | Performed by: SPEECH-LANGUAGE PATHOLOGIST

## 2023-04-20 NOTE — THERAPY TREATMENT NOTE
Outpatient Speech Language Pathology   Adult Swallow Treatment Note  [facility]     Patient Name: Jose Hurtado  : 1950  MRN: [id number]  Today's Date: 2023         Visit Date: 2023   Patient Active Problem List   Diagnosis   • History of esophageal cancer   • Adenomatous polyp of colon   • Malignant neoplasm of prostate   • RLS (restless legs syndrome)   • History of DVT (deep vein thrombosis)   • Recurrent major depressive disorder, in partial remission   • Hypertension   • Anemia   • Insomnia due to other mental disorder   • Peripheral polyneuropathy   • B12 deficiency   • Lymphedema   • Iron deficiency   • Gastroparesis   • Acute deep vein thrombosis (DVT) of popliteal vein of left lower extremity   • Tremor of both hands   • Gastrointestinal hemorrhage   • Acute blood loss anemia   • Pancytopenia   • Chronic venous hypertension due to DVT   • Bleeding hemorrhoid   • Malabsorption of iron   • Iron deficiency anemia   • History of esophageal cancer   • Abnormal CT scan   • Generalized weakness   • Chronic anticoagulation   • CKD (chronic kidney disease) stage 2, GFR 60-89 ml/min   • Dysphagia   • NSTEMI (non-ST elevated myocardial infarction)   • Bronchitis   • Dyspnea   • Elevated troponin   • Atrial fibrillation   • Chronic obstructive pulmonary disease   • Congestive heart failure        Visit Dx:    ICD-10-CM ICD-9-CM   1. Oropharyngeal dysphagia  R13.12 787.22                              SLP OP Goals     Row Name 23 1400          Subjective Comments    Subjective Comments Patient is pleasant and cooperative, arrived 20 minutes late. Planned d/c next week. Patient states he completes his exercises once a day.  -KA        Subjective Pain    Able to rate subjective pain? yes  -KA     Pre-Treatment Pain Level 0  -KA     Post-Treatment Pain Level 0  -KA        Dysphagia Goals    Patient will increase laryngeal elevation to reduce residue that might fall into airway by completing  super-supraglottic swallow;without cues  -KA     Status: Patient will increase laryngeal elevation to reduce residue that might fall into airway by completing Progressing as expected  -KA     Comments: Patient will increase laryngeal elevation to reduce residue that might fall into airway by completing Patient completed x5 with min cues,  -KA     Patient will increase strength of tongue base and posterior pharyngeal walls to reduce residue that might fall into airway by completing effotful swallow;Shanon (tongue hold);without cues  -KA     Status: Patient will increase strength of tongue base and posterior pharyngeal walls to reduce residue that might fall into airway by completing Progressing as expected  -KA     Comments: Patient will increase strength of tongue base and posterior pharyngeal walls to reduce residue that might fall into airway by completing Patient completed x10 with min cues  -KA     Status: Patient will increase superior/anterior movement of hyolaryngeal complex to reduce residue which may fall into airway by using the Expiratory Muscle Strength Trainer Progressing as expected  -KA     Comments: Patient will increase superior/anterior movement of hyolaryngeal complex to reduce residue which may fall into airway by using the Expiratory Muscle Strength Trainer Patient brought EMST device today, set to [id number] with pt adequately completing 5 sets of 5 breaths. Educated patient on protocol.  -KA     Patient will compensate for oral/pharyngeal deficits and reduce risks while eating by utilizing  compensatory strategies slow rate;controlled bolus size;liquid wash;multiple swallows;without cues  -KA     Status: Patient will compensate for oral/pharyngeal deficits and reduce risks while eating by utilizing  compensatory strategies Progressing as expected  -KA     Comments: Patient will compensate for oral/pharyngeal deficits and reduce risks while eating by utilizing  compensatory strategies Patient able  to verbalize his safe swallow precautions  -DEVONTE           User Key  (r) = Recorded By, (t) = Taken By, (c) = Cosigned By    Initials Name Provider Type    Roman Rubio MA,CCC-SLP Speech and Language Pathologist                      Time Calculation:   SLP Start Time: 1320  SLP Stop Time: 1345  SLP Time Calculation (min): 25 min  Untimed Charges  07290-KZ Treatment Swallow Minutes: 25  Total Minutes  Untimed Charges Total Minutes: 25   Total Minutes: 25    Therapy Charges for Today     Code Description Service Date Service Provider Modifiers Qty    [id number]  ST TREATMENT SWALLOW 2 4/20/2023 Roman Stearns MA,CCC-SLP GN 1                   Roman Stearns MA,CCC-SLP  4/20/2023

## 2023-04-27 ENCOUNTER — HOSPITAL ENCOUNTER (OUTPATIENT)
Dept: SPEECH THERAPY | Facility: HOSPITAL | Age: 73
Setting detail: THERAPIES SERIES
Discharge: HOME | End: 2023-04-27
Payer: MEDICARE

## 2023-04-27 DIAGNOSIS — R13.12 OROPHARYNGEAL DYSPHAGIA: Primary | ICD-10-CM

## 2023-04-27 PROCEDURE — 92526 ORAL FUNCTION THERAPY: CPT | Performed by: SPEECH-LANGUAGE PATHOLOGIST

## 2023-04-27 NOTE — THERAPY DISCHARGE NOTE
"Outpatient Speech Language Pathology   Adult Swallow Treatment Note/Discharge Summary  [facility]     Patient Name: Jose Hurtado  : 1950  MRN: [id number]  Today's Date: 2023         Visit Date: 2023   Patient Active Problem List   Diagnosis    History of esophageal cancer    Adenomatous polyp of colon    Malignant neoplasm of prostate    RLS (restless legs syndrome)    History of DVT (deep vein thrombosis)    Recurrent major depressive disorder, in partial remission    Hypertension    Anemia    Insomnia due to other mental disorder    Peripheral polyneuropathy    B12 deficiency    Lymphedema    Iron deficiency    Gastroparesis    Acute deep vein thrombosis (DVT) of popliteal vein of left lower extremity    Tremor of both hands    Gastrointestinal hemorrhage    Acute blood loss anemia    Pancytopenia    Chronic venous hypertension due to DVT    Bleeding hemorrhoid    Malabsorption of iron    Iron deficiency anemia    History of esophageal cancer    Abnormal CT scan    Generalized weakness    Chronic anticoagulation    CKD (chronic kidney disease) stage 2, GFR 60-89 ml/min    Dysphagia    NSTEMI (non-ST elevated myocardial infarction)    Bronchitis    Dyspnea    Elevated troponin    Atrial fibrillation    Chronic obstructive pulmonary disease    Congestive heart failure        Visit Dx:    ICD-10-CM ICD-9-CM   1. Oropharyngeal dysphagia  R13.12 787.22                              SLP OP Goals       Row Name 23 1000          Subjective Comments    Subjective Comments Patient is pleasant and cooperative. He is overall noncompliant with home exercise program and admits this. He has not used EMST \"as I have been told to.\" He reports he maybe does his swallow exercises once a day. Overall education has been completed, pt is able to demonstrate all exercises adequately and d/c today.  -KA        Subjective Pain    Able to rate subjective pain? yes  -KA     Pre-Treatment Pain Level 0  -KA     " Post-Treatment Pain Level 0  -KA        Dysphagia Goals    Status: Patient will safely consume the recommended diet without complications such as aspiration pneumonia Achieved  -KA     Comments: Patient will safely consume the recommended diet without complications such as aspiration pneumonia Patient is tolerating current diet without negative lung changes. Education completed on importance of safe swallow precautions and compliance with home exercise program.  -KA     Patient will increase laryngeal elevation to reduce residue that might fall into airway by completing super-supraglottic swallow;without cues  -KA     Status: Patient will increase laryngeal elevation to reduce residue that might fall into airway by completing Achieved  -KA     Comments: Patient will increase laryngeal elevation to reduce residue that might fall into airway by completing Patient completed x10 without cues  -KA     Patient will increase strength of tongue base and posterior pharyngeal walls to reduce residue that might fall into airway by completing effotful swallow;Shanon (tongue hold);without cues  -KA     Status: Patient will increase strength of tongue base and posterior pharyngeal walls to reduce residue that might fall into airway by completing Achieved  -KA     Comments: Patient will increase strength of tongue base and posterior pharyngeal walls to reduce residue that might fall into airway by completing Patient completed x10 without cues  -KA     Status: Patient will increase superior/anterior movement of hyolaryngeal complex to reduce residue which may fall into airway by using the Expiratory Muscle Strength Trainer Achieved  -KA     Comments: Patient will increase superior/anterior movement of hyolaryngeal complex to reduce residue which may fall into airway by using the Expiratory Muscle Strength Trainer Patient brought EMST device today, set to [id number] with pt adequately completing 5 sets of 5 breaths. Educated patient on  protocol and importance of completing 5x a week.  -KA     Patient will compensate for oral/pharyngeal deficits and reduce risks while eating by utilizing  compensatory strategies slow rate;controlled bolus size;liquid wash;multiple swallows;without cues  -KA     Status: Patient will compensate for oral/pharyngeal deficits and reduce risks while eating by utilizing  compensatory strategies Achieved  -KA     Comments: Patient will compensate for oral/pharyngeal deficits and reduce risks while eating by utilizing  compensatory strategies Patient able to verbalize his safe swallow precautions  -KA               User Key  (r) = Recorded By, (t) = Taken By, (c) = Cosigned By      Initials Name Provider Type    Roman Rubio MA,CCC-SLP Speech and Language Pathologist                              Time Calculation:   SLP Start Time: 1015  SLP Stop Time: 1053  SLP Time Calculation (min): 38 min  Untimed Charges  90284-MY Treatment Swallow Minutes: 38  Total Minutes  Untimed Charges Total Minutes: 38   Total Minutes: 38    Therapy Charges for Today       Code Description Service Date Service Provider Modifiers Qty    [id number]  ST TREATMENT SWALLOW 3 4/27/2023 Roman Stearns MA,CCC-SLP GN 1                        Roman Stearns MA,CCC-SLP  4/27/2023

## 2023-05-04 ENCOUNTER — APPOINTMENT (OUTPATIENT)
Dept: SPEECH THERAPY | Facility: HOSPITAL | Age: 73
End: 2023-05-04
Payer: MEDICARE

## 2023-05-12 ENCOUNTER — OFFICE VISIT (OUTPATIENT)
Dept: INTERNAL MEDICINE | Age: 73
End: 2023-05-12
Payer: MEDICARE

## 2023-05-12 VITALS
DIASTOLIC BLOOD PRESSURE: 62 MMHG | HEART RATE: 84 BPM | HEIGHT: 77 IN | OXYGEN SATURATION: 98 % | WEIGHT: 200 LBS | BODY MASS INDEX: 23.62 KG/M2 | SYSTOLIC BLOOD PRESSURE: 142 MMHG | TEMPERATURE: 97.1 F

## 2023-05-12 DIAGNOSIS — J44.9 CHRONIC OBSTRUCTIVE PULMONARY DISEASE, UNSPECIFIED COPD TYPE: Chronic | ICD-10-CM

## 2023-05-12 DIAGNOSIS — Z86.718 HISTORY OF RECURRENT DEEP VEIN THROMBOSIS (DVT): Chronic | ICD-10-CM

## 2023-05-12 DIAGNOSIS — I10 PRIMARY HYPERTENSION: Chronic | ICD-10-CM

## 2023-05-12 DIAGNOSIS — I21.4 NSTEMI (NON-ST ELEVATED MYOCARDIAL INFARCTION): ICD-10-CM

## 2023-05-12 DIAGNOSIS — N18.2 CKD (CHRONIC KIDNEY DISEASE) STAGE 2, GFR 60-89 ML/MIN: Chronic | ICD-10-CM

## 2023-05-12 DIAGNOSIS — F33.41 RECURRENT MAJOR DEPRESSIVE DISORDER, IN PARTIAL REMISSION: Chronic | ICD-10-CM

## 2023-05-12 DIAGNOSIS — R26.81 GAIT INSTABILITY: Primary | ICD-10-CM

## 2023-05-12 DIAGNOSIS — I50.9 CHRONIC CONGESTIVE HEART FAILURE, UNSPECIFIED HEART FAILURE TYPE: Chronic | ICD-10-CM

## 2023-05-12 LAB
ALBUMIN SERPL-MCNC: 3.7 G/DL (ref 3.5–5.2)
ALBUMIN/GLOB SERPL: 1.5 G/DL
ALP SERPL-CCNC: 108 U/L (ref 39–117)
ALT SERPL-CCNC: 17 U/L (ref 1–41)
AST SERPL-CCNC: 23 U/L (ref 1–40)
BILIRUB SERPL-MCNC: 0.4 MG/DL (ref 0–1.2)
BUN SERPL-MCNC: 25 MG/DL (ref 8–23)
BUN/CREAT SERPL: 15.2 (ref 7–25)
CALCIUM SERPL-MCNC: 8.9 MG/DL (ref 8.6–10.5)
CHLORIDE SERPL-SCNC: 108 MMOL/L (ref 98–107)
CHOLEST SERPL-MCNC: 116 MG/DL (ref 0–200)
CHOLEST/HDLC SERPL: 2.23 {RATIO}
CO2 SERPL-SCNC: 26.7 MMOL/L (ref 22–29)
CREAT SERPL-MCNC: 1.65 MG/DL (ref 0.76–1.27)
EGFRCR SERPLBLD CKD-EPI 2021: 43 ML/MIN/1.73
GLOBULIN SER CALC-MCNC: 2.5 GM/DL
GLUCOSE SERPL-MCNC: 110 MG/DL (ref 65–99)
HDLC SERPL-MCNC: 52 MG/DL (ref 40–60)
LDLC SERPL CALC-MCNC: 52 MG/DL (ref 0–100)
POTASSIUM SERPL-SCNC: 3.9 MMOL/L (ref 3.5–5.2)
PROT SERPL-MCNC: 6.2 G/DL (ref 6–8.5)
SODIUM SERPL-SCNC: 142 MMOL/L (ref 136–145)
T4 FREE SERPL-MCNC: 1.36 NG/DL (ref 0.93–1.7)
TRIGL SERPL-MCNC: 49 MG/DL (ref 0–150)
TSH SERPL DL<=0.005 MIU/L-ACNC: 1.6 UIU/ML (ref 0.27–4.2)
VLDLC SERPL CALC-MCNC: 12 MG/DL (ref 5–40)

## 2023-05-12 NOTE — ASSESSMENT & PLAN NOTE
Resume paroxetine 40 mg daily (stopped taking on own recently). Suspect he has worsening depression that may be affecting his judgment. Advised to follow-up in 2 months and bring spouse/child. He expressed understanding and agreed to follow above instructions.

## 2023-05-12 NOTE — ASSESSMENT & PLAN NOTE
Lab Results   Component Value Date    CREATININE 1.50 (H) 03/29/2023    CREATININE 1.44 (H) 01/04/2023    CREATININE 1.23 01/03/2023    CREATININE 1.47 (H) 01/02/2023    CREATININE 1.60 (H) 12/28/2022    BUN 22 01/04/2023    EGFRIFNONA 62 06/04/2021    EGFRIFAFRI 75 06/04/2021        Check labs today

## 2023-05-12 NOTE — PROGRESS NOTES
I N T E R N A L  M E D I C I N E    J U N O H  K I M,  M D      ENCOUNTER DATE:  05/12/2023    Jose FITCH Hurtado / 73 y.o. / male    CHIEF COMPLAINT / REASON FOR OFFICE VISIT     Medication Problem (Forget to take medication . )      ASSESSMENT & PLAN     Problem List Items Addressed This Visit        High    Recurrent major depressive disorder, in partial remission (Chronic)    Current Assessment & Plan     Resume paroxetine 40 mg daily (stopped taking on own recently). Suspect he has worsening depression that may be affecting his judgment. Advised to follow-up in 2 months and bring spouse/child. He expressed understanding and agreed to follow above instructions.          Relevant Medications    PARoxetine (PAXIL) 40 MG tablet    Other Relevant Orders    TSH+Free T4    Gait instability - Primary (Chronic)    Current Assessment & Plan     High risk for falls. Advised to resume PT. Must use walker at all times now. He expressed understanding and agreed to follow above instructions.             Medium    Hypertension (Chronic)    Overview     Continue metoprolol tartrate 25 mg BID.          Relevant Medications    furosemide (LASIX) 40 MG tablet    CKD (chronic kidney disease) stage 2, GFR 60-89 ml/min (Chronic)    Current Assessment & Plan     Lab Results   Component Value Date    CREATININE 1.50 (H) 03/29/2023    CREATININE 1.44 (H) 01/04/2023    CREATININE 1.23 01/03/2023    CREATININE 1.47 (H) 01/02/2023    CREATININE 1.60 (H) 12/28/2022    BUN 22 01/04/2023    EGFRIFNONA 62 06/04/2021    EGFRIFAFRI 75 06/04/2021        Check labs today          Relevant Medications    furosemide (LASIX) 40 MG tablet    Chronic obstructive pulmonary disease (Chronic)    Current Assessment & Plan     Resume Spiriva Respimat            Relevant Medications    albuterol sulfate  (90 Base) MCG/ACT inhaler    tiotropium bromide monohydrate (Spiriva Respimat) 2.5 MCG/ACT aerosol solution inhaler    Congestive heart failure  "(Chronic)    Current Assessment & Plan     Resume all heart related medications (he stopped taking them regularly)         Relevant Medications    furosemide (LASIX) 40 MG tablet    Other Relevant Orders    Comprehensive Metabolic Panel    TSH+Free T4       Low    History of recurrent deep vein thrombosis (DVT) (Chronic)    Current Assessment & Plan     Resume Eliquis (he stopped taking medications regularly)         NSTEMI (non-ST elevated myocardial infarction)    Relevant Orders    Lipid Panel With / Chol / HDL Ratio     Orders Placed This Encounter   Procedures   • Comprehensive Metabolic Panel   • Lipid Panel With / Chol / HDL Ratio   • TSH+Free T4     No orders of the defined types were placed in this encounter.      SUMMARY/DISCUSSION  •       Next Appointment with me: 6/20/2023    Return in about 2 months (around 7/17/2023) for COMBINED AWV AND MEDICAL F/U (30 MIN)(SEE ME OR APRN IF NOT AVAILBLE FOR ME).      VITAL SIGNS     Vitals:    05/12/23 0734   BP: 142/62   Pulse: 84   Temp: 97.1 °F (36.2 °C)   SpO2: 98%   Weight: 90.7 kg (200 lb)   Height: 195.6 cm (77\")       BP Readings from Last 3 Encounters:   05/12/23 142/62   04/04/23 120/54   03/22/23 144/78     Wt Readings from Last 3 Encounters:   05/12/23 90.7 kg (200 lb)   04/04/23 93 kg (205 lb)   03/22/23 93.4 kg (205 lb 12.8 oz)     Body mass index is 23.72 kg/m².    Blood pressure readings recorded on patient flowsheet:       View : No data to display.                  MEDICATIONS AT THE TIME OF OFFICE VISIT     Current Outpatient Medications on File Prior to Visit   Medication Sig   • albuterol sulfate  (90 Base) MCG/ACT inhaler Inhale 1 puff Every 4 (Four) Hours As Needed for Wheezing.   • atorvastatin (LIPITOR) 40 MG tablet Take 1 tablet by mouth Every Night.   • cyanocobalamin 1000 MCG/ML injection INJECT 1 ML INTRAMUSCULARLY ONCE EVERY 30 DAYS AS DIRECTED   • Eliquis 5 MG tablet tablet TAKE ONE TABLET BY MOUTH EVERY 12 HOURS   • furosemide " "(LASIX) 40 MG tablet Take 1 tablet by mouth 2 (Two) Times a Day.   • Gemtesa 75 MG tablet Daily.   • metoprolol tartrate (LOPRESSOR) 50 MG tablet Take 1 tablet by mouth 2 (Two) Times a Day for 60 days.   • pantoprazole (PROTONIX) 40 MG EC tablet TAKE ONE TABLET BY MOUTH TWICE A DAY BEFORE A MEAL   • PARoxetine (PAXIL) 40 MG tablet TAKE ONE TABLET BY MOUTH EVERY MORNING   • potassium chloride (MICRO-K) 10 MEQ CR capsule Take 1 capsule by mouth Daily.   • pramipexole (MIRAPEX) 1.5 MG tablet TAKE ONE TABLET BY MOUTH TWICE A DAY   • tiotropium bromide monohydrate (Spiriva Respimat) 2.5 MCG/ACT aerosol solution inhaler Inhale 2 puffs Daily.     No current facility-administered medications on file prior to visit.          HISTORY OF PRESENT ILLNESS     Stopped taking most of his medications recently including paroxetine for depression. Complains of increased difficulty getting around due to imbalance and gait issues. Feels more \"stooped over\". Uses a cane/stick but not using a walker he has at home. Denies recent fall. Feels depression may be worse but denies suicidal ideation. Stopped paroxetine 3 days ago.       Patient Care Team:  Brandin Bolton MD as PCP - General (Internal Medicine)  Tapan, George THORPE II, MD as Consulting Physician (Hematology and Oncology)  Jose Inman MD as Consulting Physician (Urology)  Mulugeta Millan MD as Consulting Physician (Gastroenterology)  Jose Christine III, MD as Referring Physician (Thoracic Surgery)  Seth Randhawa MD as Consulting Physician (Ophthalmology)  James Cyr MD as Consulting Physician (Cardiology)  Stephon Sommers MD as Consulting Physician (Sleep Medicine)  Rolanda Solomon MD as Consulting Physician (Nephrology)    REVIEW OF SYSTEMS     Review of Systems   Constitutional neg except per HPI   Resp neg  CV neg   GI negative   Psych negative for suicidal ideation but has worsening depression     PHYSICAL EXAMINATION     Physical Exam  Appears in no " acute distress   Alert with normal thought and judgment (grossly); depressed mood with more apathy   Heart: irregular, normal rate   Pulm/Chest: Effort normal, breath sounds normal.   Gait: very unstable with cane, knees are flexed when walking, kyphosis of spine, unsteady when turning       REVIEWED DATA     Labs:     Lab Results   Component Value Date     01/04/2023    K 4.5 01/04/2023    CALCIUM 8.0 (L) 01/04/2023    AST 25 01/02/2023    ALT 18 01/02/2023    BUN 22 01/04/2023    CREATININE 1.50 (H) 03/29/2023    CREATININE 1.44 (H) 01/04/2023    CREATININE 1.23 01/03/2023    EGFRIFNONA 62 06/04/2021    EGFRIFAFRI 75 06/04/2021       Lab Results   Component Value Date    HGBA1C 5.50 08/06/2021    HGBA1C 6.20 (H) 01/17/2020       Lab Results   Component Value Date    LDL 98 06/02/2022    LDL 92 05/10/2018     (H) 01/30/2018    HDL 60 06/02/2022    HDL 45 05/10/2018    TRIG 53 06/02/2022    TRIG 79 05/10/2018       Lab Results   Component Value Date    TSH 2.710 01/17/2020    TSH 2.050 06/06/2019    TSH 3.440 02/13/2019    FREET4 1.52 01/17/2020    FREET4 1.18 06/06/2019    FREET4 1.33 02/13/2019       Lab Results   Component Value Date    WBC 4.25 02/15/2023    HGB 11.0 (L) 02/15/2023     02/15/2023       No results found for: MALBCRERATIO         Imaging:     XR Knee 3 Vws RT (04/16/2021 08:37)    MRI Lumbar Spine Without Contrast (07/10/2019 08:28)  1. Conus medullaris tip is situated posteriorly in the thecal sac and  terminates at L3 body level which is low.  These findings may indicate a  tethered cord and could be correlated with symptoms of tethered cord  syndrome.  2. Multilevel degenerative disc disease. Disc bulges contribute to mild  narrowing of the central canal at L1-2 and L2-3. Disc bulge at L3-4 is  eccentric to the right with mild narrowing of the right lateral recess.  There is facet overgrowth at L4-5 and L5-S1 with mild narrowing of the  right neural foramen at  L4-5.    Medical Tests:           Summary of old records / correspondence / consultant report:           Request outside records:             *Examiner was wearing [id number] mask during the entire duration of the visit. Patient was masked the entire time. Minimum social distance of 6 ft maintained entire visit except if physical contact was necessary as documented.       Template created by Moose Bolton MD

## 2023-05-12 NOTE — ASSESSMENT & PLAN NOTE
High risk for falls. Advised to resume PT. Must use walker at all times now. He expressed understanding and agreed to follow above instructions.

## 2023-05-15 ENCOUNTER — HOSPITAL ENCOUNTER (OUTPATIENT)
Dept: GENERAL RADIOLOGY | Facility: HOSPITAL | Age: 73
Discharge: HOME | End: 2023-05-15
Admitting: INTERNAL MEDICINE
Payer: MEDICARE

## 2023-05-15 DIAGNOSIS — K21.00 PEPTIC REFLUX ESOPHAGITIS: ICD-10-CM

## 2023-05-15 PROCEDURE — 74220 X-RAY XM ESOPHAGUS 1CNTRST: CPT

## 2023-05-15 PROCEDURE — 63710000001 BARIUM SULFATE 96 % RECONSTITUTED SUSPENSION: Performed by: INTERNAL MEDICINE

## 2023-05-15 PROCEDURE — A9270 NON-COVERED ITEM OR SERVICE: HCPCS | Performed by: INTERNAL MEDICINE

## 2023-05-15 RX ADMIN — BARIUM SULFATE 183 ML: 960 POWDER, FOR SUSPENSION ORAL at 10:39

## 2023-05-16 ENCOUNTER — LAB (OUTPATIENT)
Dept: OTHER | Facility: HOSPITAL | Age: 73
End: 2023-05-16
Payer: MEDICARE

## 2023-05-16 ENCOUNTER — OFFICE VISIT (OUTPATIENT)
Dept: ONCOLOGY | Facility: CLINIC | Age: 73
End: 2023-05-16
Payer: MEDICARE

## 2023-05-16 VITALS
HEIGHT: 77 IN | DIASTOLIC BLOOD PRESSURE: 62 MMHG | WEIGHT: 185.4 LBS | RESPIRATION RATE: 18 BRPM | TEMPERATURE: 98.2 F | BODY MASS INDEX: 21.89 KG/M2 | OXYGEN SATURATION: 91 % | SYSTOLIC BLOOD PRESSURE: 132 MMHG | HEART RATE: 60 BPM

## 2023-05-16 DIAGNOSIS — E53.8 B12 DEFICIENCY: ICD-10-CM

## 2023-05-16 DIAGNOSIS — D64.9 ANEMIA, UNSPECIFIED TYPE: ICD-10-CM

## 2023-05-16 DIAGNOSIS — C61 MALIGNANT NEOPLASM OF PROSTATE: Primary | ICD-10-CM

## 2023-05-16 LAB
BASOPHILS # BLD AUTO: 0.02 10*3/MM3 (ref 0–0.2)
BASOPHILS NFR BLD AUTO: 0.5 % (ref 0–1.5)
DEPRECATED RDW RBC AUTO: 45.4 FL (ref 37–54)
EOSINOPHIL # BLD AUTO: 0.17 10*3/MM3 (ref 0–0.4)
EOSINOPHIL NFR BLD AUTO: 4.4 % (ref 0.3–6.2)
ERYTHROCYTE [DISTWIDTH] IN BLOOD BY AUTOMATED COUNT: 13.6 % (ref 12.3–15.4)
FERRITIN SERPL-MCNC: 357.9 NG/ML (ref 30–400)
HCT VFR BLD AUTO: 36.9 % (ref 37.5–51)
HGB BLD-MCNC: 12 G/DL (ref 13–17.7)
HGB RETIC QN AUTO: 31.4 PG (ref 29.8–36.1)
IMM GRANULOCYTES # BLD AUTO: 0 10*3/MM3 (ref 0–0.05)
IMM GRANULOCYTES NFR BLD AUTO: 0 % (ref 0–0.5)
IMM RETICS NFR: 7.3 % (ref 3–15.8)
IRON 24H UR-MRATE: 46 MCG/DL (ref 59–158)
IRON SATN MFR SERPL: 15 % (ref 20–50)
LYMPHOCYTES # BLD AUTO: 1.05 10*3/MM3 (ref 0.7–3.1)
LYMPHOCYTES NFR BLD AUTO: 27.2 % (ref 19.6–45.3)
MCH RBC QN AUTO: 29.3 PG (ref 26.6–33)
MCHC RBC AUTO-ENTMCNC: 32.5 G/DL (ref 31.5–35.7)
MCV RBC AUTO: 90 FL (ref 79–97)
MONOCYTES # BLD AUTO: 0.47 10*3/MM3 (ref 0.1–0.9)
MONOCYTES NFR BLD AUTO: 12.2 % (ref 5–12)
NEUTROPHILS NFR BLD AUTO: 2.15 10*3/MM3 (ref 1.7–7)
NEUTROPHILS NFR BLD AUTO: 55.7 % (ref 42.7–76)
NRBC BLD AUTO-RTO: 0 /100 WBC (ref 0–0.2)
PLATELET # BLD AUTO: 180 10*3/MM3 (ref 140–450)
PMV BLD AUTO: 9 FL (ref 6–12)
RBC # BLD AUTO: 4.1 10*6/MM3 (ref 4.14–5.8)
RETICS # AUTO: 0.02 10*6/MM3 (ref 0.02–0.13)
RETICS/RBC NFR AUTO: 0.43 % (ref 0.7–1.9)
TIBC SERPL-MCNC: 316 MCG/DL (ref 298–536)
TRANSFERRIN SERPL-MCNC: 212 MG/DL (ref 200–360)
VIT B12 BLD-MCNC: 1124 PG/ML (ref 211–946)
WBC NRBC COR # BLD: 3.86 10*3/MM3 (ref 3.4–10.8)

## 2023-05-16 PROCEDURE — 36415 COLL VENOUS BLD VENIPUNCTURE: CPT

## 2023-05-16 PROCEDURE — 84466 ASSAY OF TRANSFERRIN: CPT | Performed by: INTERNAL MEDICINE

## 2023-05-16 PROCEDURE — 83540 ASSAY OF IRON: CPT | Performed by: INTERNAL MEDICINE

## 2023-05-16 PROCEDURE — 82728 ASSAY OF FERRITIN: CPT | Performed by: INTERNAL MEDICINE

## 2023-05-16 PROCEDURE — 82607 VITAMIN B-12: CPT | Performed by: INTERNAL MEDICINE

## 2023-05-16 PROCEDURE — 85025 COMPLETE CBC W/AUTO DIFF WBC: CPT | Performed by: INTERNAL MEDICINE

## 2023-05-16 PROCEDURE — 85046 RETICYTE/HGB CONCENTRATE: CPT | Performed by: INTERNAL MEDICINE

## 2023-05-16 NOTE — PROGRESS NOTES
Subjective .     REASONS FOR FOLLOWUP:  Esophageal cancer, cytopenias     HISTORY OF PRESENT ILLNESS:  The patient is a 73 y.o. year old male  who is here for follow-up with the above-mentioned history.    States he is doing okay.  No weight loss.  No bleeding.  Denies any problems eating.  States he had a CT through Dr. Capone which was unremarkable.    Past Medical History:   Diagnosis Date   • Acute deep vein thrombosis (DVT) of popliteal vein of left lower extremity 03/24/2020   • Anemia    • Anti-phospholipid syndrome     On lifelong anticoagulation therapy   • Bladder disorder     LEAKAGE   ON MED  wers pads   • Bleeding hemorrhoids    • Chronic kidney disease    • Community acquired pneumonia     HISTORY OF IN 2014   • Congestive heart failure    • COPD (chronic obstructive pulmonary disease)    • Deep venous thrombosis 2006, 2008    Left lower extremity multiple   • Depression    • Esophageal carcinoma 12/31/2014    had chemo and radiation prior surgery   • Hemorrhoids    • HH (hiatus hernia)    • History of atrial fibrillation 2015    ONE EPISODE WHILE HOSPITALIZED   • History of kidney stones    • History of nephrolithiasis    • History of pancreatitis     PT STATES MANY YEARS AGO   • History of radiation therapy    • History of transfusion    • Hypertension    • Long-term (current) use of anticoagulants, INR goal 2.0-3.0    • Lymphedema    • Malignant neoplasm of prostate    • Other hyperlipidemia 01/30/2018 January 30, 2018 lipid panel risk 12.8%   • Restless legs syndrome    • Sleep apnea     OCCASIONALLY WEARS CPAP   • Squamous carcinoma     on the head     Past Surgical History:   Procedure Laterality Date   • APPENDECTOMY  1950   • BRONCHOSCOPY      (Diagnostic)   • CARDIAC CATHETERIZATION N/A 5/14/2022    Procedure: Left Heart Cath;  Surgeon: Eric So MD;  Location: [facility] CATH INVASIVE LOCATION;  Service: Cardiology;  Laterality: N/A;   • CARDIAC CATHETERIZATION N/A 5/14/2022     Procedure: Coronary angiography;  Surgeon: Eric So MD;  Location: [facility] CATH INVASIVE LOCATION;  Service: Cardiology;  Laterality: N/A;   • CARDIAC CATHETERIZATION N/A 5/14/2022    Procedure: Left ventriculography;  Surgeon: Eric So MD;  Location: [facility] CATH INVASIVE LOCATION;  Service: Cardiology;  Laterality: N/A;   • CATARACT EXTRACTION Bilateral 2014   • COLONOSCOPY  12/15/2014    Complete / Description: EH, IH, torts, stool, follow-up colonoscopy due in 5 years.   • COLONOSCOPY N/A 6/13/2017    non-thrombosed external hemorrhoids, normal examined ileum, IH   • COLONOSCOPY N/A 2/26/2019    Procedure: COLONOSCOPY with Cold Polypectomy;  Surgeon: Mulugeta Millan MD;  Location[facility] ENDOSCOPY;  Service: Gastroenterology   • COLONOSCOPY N/A 12/16/2020    Procedure: COLONOSCOPY to cecum into TI;  Surgeon: Mesha Sanchez MD;  Location: [facility] ENDOSCOPY;  Service: Gastroenterology;  Laterality: N/A;  pre: lower GI bleed  post: hemorrhoids    • CYSTOSCOPY W/ LASER LITHOTRIPSY     • ENDOSCOPY N/A 6/13/2017    Procedure: ESOPHAGOGASTRODUODENOSCOPY WITH COLD BIOPSY;  Surgeon: Lake Gonzalez MD;  [facility];  Service:    • ENDOSCOPY N/A 11/18/2021    Procedure: ESOPHAGOGASTRODUODENOSCOPY WITH  DILATATION WITH FLUOROSCOPY;  Surgeon: Jose Christine III, MD;  Location: [facility] ENDOSCOPY;  Service: Gastroenterology;  Laterality: N/A;  Pre: dysphagia, h/x of adinocarcinoma of esophagus  Post: same   • ESOPHAGECTOMY      April 2015, stage IIB esophageal carcinoma, sub-total resection.   • ESOPHAGECTOMY      Esophagectomy Subtotal Andrea Joe Procedure   • EXCISION LESION  08/2012    Removal of Squamous Cell CA on Head   • HAMMER TOE REPAIR  09/2014    Hammertoe Operation (Each Toe), 10/2014   • HAMMER TOE REPAIR Left 10/3/2017    Procedure: Left second third and fourth distal interphalangeal joint resection with flexor tenotomy;  Surgeon: Mulugeta Lira MD;   Location: [facility] OR OSC;  Service:    • HEMORRHOIDECTOMY N/A 12/23/2020    Procedure: HEMORRHOIDECTOMY;  Surgeon: Billy Julio Jr., MD;  Location: [facility] MAIN OR;  Service: General;  Laterality: N/A;   • HERNIA REPAIR      incisional   • JEJUNOSTOMY      Laparoscopic   • JEJUNOSTOMY      tube removal    • KNEE SURGERY Bilateral 1967, 1973, 1981   • PATELLA SURGERY Left     removed   • PILONIDAL CYST / SINUS EXCISION     • RI ARTHRP KNE CONDYLE&PLATU MEDIAL&LAT COMPARTMENTS Right 3/26/2018    Procedure: TOTAL KNEE ARTHROPLASTY;  Surgeon: Renny Solis MD;  Location: [facility] MAIN OR;  Service: Orthopedics   • PROSTATECTOMY  2010   • PYLOROPLASTY     • SPINAL FUSION  02/1998    C 5,6   • TONSILLECTOMY     • UPPER GASTROINTESTINAL ENDOSCOPY  12/15/2014    LA Grade D esophagitis, Pardo's, HH, multiple duodenal ulcers   • VENTRAL/INCISIONAL HERNIA REPAIR N/A 4/14/2016    Procedure: VENTRAL/INCISIONAL HERNIA REPAIR, open, with mesh, and component separation;  Surgeon: Darren Rivas MD;  Location: [facility] MAIN OR;  Service:        HEMATOLOGIC/ONCOLOGIC HISTORY:  (History from previous dates can be found in the separate document.)    MEDICATIONS    Current Outpatient Medications:   •  albuterol sulfate  (90 Base) MCG/ACT inhaler, Inhale 1 puff Every 4 (Four) Hours As Needed for Wheezing., Disp: , Rfl:   •  atorvastatin (LIPITOR) 40 MG tablet, Take 1 tablet by mouth Every Night., Disp: 90 tablet, Rfl: 3  •  cyanocobalamin 1000 MCG/ML injection, INJECT 1 ML INTRAMUSCULARLY ONCE EVERY 30 DAYS AS DIRECTED, Disp: 1 mL, Rfl: 11  •  Eliquis 5 MG tablet tablet, TAKE ONE TABLET BY MOUTH EVERY 12 HOURS, Disp: 60 tablet, Rfl: 1  •  furosemide (LASIX) 40 MG tablet, Take 1 tablet by mouth 2 (Two) Times a Day., Disp: , Rfl:   •  Gemtesa 75 MG tablet, Daily., Disp: , Rfl:   •  pantoprazole (PROTONIX) 40 MG EC tablet, TAKE ONE TABLET BY MOUTH TWICE A DAY BEFORE A MEAL, Disp: 180 tablet, Rfl: 3  •  PARoxetine (PAXIL) 40  MG tablet, TAKE ONE TABLET BY MOUTH EVERY MORNING, Disp: 30 tablet, Rfl: 5  •  potassium chloride (MICRO-K) 10 MEQ CR capsule, Take 1 capsule by mouth Daily., Disp: 90 capsule, Rfl: 5  •  pramipexole (MIRAPEX) 1.5 MG tablet, TAKE ONE TABLET BY MOUTH TWICE A DAY, Disp: 180 tablet, Rfl: 1  •  tiotropium bromide monohydrate (Spiriva Respimat) 2.5 MCG/ACT aerosol solution inhaler, Inhale 2 puffs Daily., Disp: 4 g, Rfl: 2  •  metoprolol tartrate (LOPRESSOR) 50 MG tablet, Take 1 tablet by mouth 2 (Two) Times a Day for 60 days., Disp: 60 tablet, Rfl: 1    ALLERGIES:   No Known Allergies    SOCIAL HISTORY:       Social History     Socioeconomic History   • Marital status:      Spouse name: Lena   • Number of children: 0   • Years of education: College   Tobacco Use   • Smoking status: Former     Packs/day: 2.00     Years: 3.00     Pack years: 6.00     Types: Cigarettes     Quit date:      Years since quittin.4   • Smokeless tobacco: Former     Types: Chew   • Tobacco comments:     Caffeine - coffee and soda    Vaping Use   • Vaping Use: Never used   Substance and Sexual Activity   • Alcohol use: Yes     Alcohol/week: 3.0 standard drinks     Types: 1 Glasses of wine, 1 Cans of beer, 1 Shots of liquor per week     Comment: 2   • Sexual activity: Defer         FAMILY HISTORY:  Family History   Problem Relation Age of Onset   • Cerebral aneurysm Mother         cerebral artery aneurysm ( age 56)   • Prostate cancer Brother 68   • Anxiety disorder Father    • Suicide Attempts Father          of suicide   • Cancer Father         bladder   • No Known Problems Brother    • Pancreatic cancer Nephew    • Colon cancer Neg Hx    • Esophageal cancer Neg Hx    • Dementia Neg Hx    • Malig Hyperthermia Neg Hx        REVIEW OF SYSTEMS:    Review of Systems   Constitutional: Negative for activity change.   HENT: Negative for nosebleeds and trouble swallowing.    Respiratory: Negative for wheezing.   "  Cardiovascular: Negative for chest pain and palpitations.   Gastrointestinal: Negative for constipation and diarrhea.   Genitourinary: Negative for dysuria and hematuria.   Musculoskeletal: Negative for arthralgias and myalgias.   Neurological: Negative for seizures and syncope.   Hematological: Negative for adenopathy. Does not bruise/bleed easily.   Psychiatric/Behavioral: Negative for confusion.           Objective    Vitals:    05/16/23 1253   BP: 132/62   Pulse: 60   Resp: 18   Temp: 98.2 °F (36.8 °C)   TempSrc: Temporal   SpO2: 91%   Weight: 84.1 kg (185 lb 6.4 oz)   Height: 195.6 cm (77.01\")   PainSc: 0-No pain         5/16/2023    12:57 PM   Current Status   ECOG score 1      PHYSICAL EXAM:          CONSTITUTIONAL:  Vital signs reviewed.  No distress, looks comfortable.  EYES:  Conjunctiva and lids unremarkable.  PERRLA  EARS,NOSE,MOUTH,THROAT:  Ears and nose appear unremarkable.  Lips, teeth, gums appear unremarkable.  RESPIRATORY:  Normal respiratory effort.  Lungs clear to auscultation bilaterally.  CARDIOVASCULAR:  Normal S1, S2.  No murmurs rubs or gallops.  Significant bilateral lower extremity edema, unchanged  GASTROINTESTINAL: Abdomen appears unremarkable.  Nontender.  No hepatomegaly.  No splenomegaly.  LYMPHATIC:  No cervical, supraclavicular, axillary lymphadenopathy.  SKIN:  Warm.  No rashes.  PSYCHIATRIC:  Normal judgment and insight.  Normal mood and affect.         RECENT LABS:        WBC   Date/Time Value Ref Range Status   05/16/2023 12:44 PM 3.86 3.40 - 10.80 10*3/mm3 Final   04/15/2019 12:31 PM 3.53 3.40 - 10.80 10*3/mm3 Final   04/16/2018 04:25 PM 4.23 (L) 4.5 - 11.0 10*3/uL Final     Hemoglobin   Date/Time Value Ref Range Status   05/16/2023 12:44 PM 12.0 (L) 13.0 - 17.7 g/dL Final   04/16/2018 04:25 PM 9.9 (L) 13.5 - 17.5 g/dL Final     Platelets   Date/Time Value Ref Range Status   05/16/2023 12:44  140 - 450 10*3/mm3 Final   04/16/2018 04:25  140 - 440 10*3/uL Final "       Assessment/Plan     ASSESSMENT:  There are no diagnoses linked to this encounter.    *Esophageal adenocarcinoma. Initially T2N0M0. After neoadjuvant carboplatin/Taxol with radiation, achieved a pathologic CR at resection, 4/24/2015.   · As per NCCN guidelines, plan CAT scans every 6 months x1 year, then every 6 to 9 months on years 2 and years 3. Defer any surveillance EGDs to Dr. Gonzalez if he feels they are appropriate.   · Also as per NCCN guidelines, plan H and P every 3 to 6 months on years 1 and years 2, then every 6 to 12 months' time on years 3 through years 5 and then annually.   · EGD 6/13/17 by Dr. Gonzalez: No evidence of recurrence.  · CT scan 3/2/18 (this completed 3 years of surveillance CT): No evidence of recurrence.  Plan no more surveillance CT.  · EGD 11/18/2021, Dr. Christine: No evidence of recurrent cancer.  Dilated.  No evidence of recurrence.  Remains in remission.    *Recurrent DVT, prior to cancer diagnosis.    · Dr. Bolton previously managed his Coumadin.   · Chronic left leg larger than right, since DVT  · Acute left popliteal, gastrocnemius DVT on 3/24/2020 despite INR 3.4.  Therefore, changed to Eliquis.  · On 3/25/2020, unremarkable: Lupus anticoagulant, beta-2 glycoprotein antibodies.  Anticardiolipin antibodies also felt to be unremarkable with IgG and IgM both at 15 (negative is <15.  Indeterminate is 15-20).  No evidence of recurrent DVT.  Continues on Eliquis    *CT angiogram chest 3/24/2020 (LLE acute DVT found 3/24/2020): Mild increased opacity LLL may represent developing infiltrate versus atelectasis.  Radiologist recommended follow-up.  · CT chest 5/1/2020: Resolution of groundglass LLL infiltrate from the 3/24/2020 CT.  A few mildly enlarged mediastinal nodes are less prominent than on the 3/18/2020 CT.  No new abnormalities.  Plan no more follow-up CT scans.    *Persistent cough.  He has seen Dr. Maher Sayied for this.    He did not complain of this today.    *Previously  complained of emesis occurring 5 hours after eating on average once every month or 2.  No nausea otherwise.  Denies dysphagia or odynophagia.    Unchanged.  Occurring on average once every couple of months.  He did not complain of this today    *Iron deficiency anemia  · Hb mostly around 11  · On 4/15/2019, ferritin 29.7, 13% saturation.  Serum folate normal.  · On oral iron daily through PCP.  · On 8/23/19, ferritin 65, 14%.   · Increased oral iron.   · On 10/15/19, ferritin 72, 10%.  · On 10/25/2019, stopped oral iron since it was not helping.    · Oral iron not effective due to poor absorption  · 2 doses Injectafer.  · On 12/13/2019, ferritin 708, 15% saturation, Hb 10.4.  · Therefore, no IV iron.  Monitor.  · On 5/5/2020, ferritin 346, 15% saturation, Hb 9.9.  Therefore, no need for IV iron at this time.  · On 7/7/2020, Hb up to 11.7.  Iron labs normal.  · Admission 12/15/2020: Hb 6.6.  Ferritin 40, iron saturation 17%.  Discharged on 12/21/2020 with Hb 7.1, which fell from 7.9 on 12/18/2020, from 8.9 in the early morning 12/18/2020.  The drop in Hb was thought to be due to hemorrhoidal bleeding related to Eliquis.  Eliquis was stopped.  Hemorrhoidal repair planned by Dr. Flynn Julio after the holidays.  Was given Venofer 300 mg on 12/17/2020 by Dr. Ross of our practice  · On 12/29/2020, ferritin 176, 13% saturation, Hb 8.4, reticulate hemoglobin low at 25.7.  Suspect he would benefit from some more iron.  Given 1 dose Injectafer.  · On 2/2/2021, ferritin 317, 17% saturation, Hb 10.3.  Therefore, Hb gradually improved since the 1 dose of Injectafer.  · 3/2/2021: Ferritin iron 93, 11% saturation, Hb 11.9.  1 dose Injectafer.  · 3/23/2021: Ferritin 471, 20% saturation, Hb 10.8  · 7/6/2021: Ferritin 329, 21% saturation, Hb 10.8  · 9/28/2021: Ferritin 230, 20% saturation, Hb 11.4.  No need for IV iron.  · 12/28/2021: Ferritin 337, 6% iron saturation, Hb 10.4.  No IV iron given.  (Although saturation is low,  ferritin is 337).  · 3/22/2022: Ferritin 112, 12% saturation, Hb 10.6.  Plan 1 dose Injectafer.  · 5/31/2022: Ferritin 473, 23% saturation, Hb 11.4  · 8/30/2022: Ferritin 308, 17% saturation, Hb 11.7.  No need for Injectafer  · 11/22/2022: Ferritin 268, 14% saturation.  Hb 11.9.  No need for Injectafer.  · 2/15/2023: Hb 11.  Ferritin 247, 19% saturation.  No need for Injectafer.  · 5/16/2023: Hb 12.  Ferritin 358, 15% saturation.  No need for Injectafer.    *Hemorrhoidal bleeding with Hb down to 6.6, requiring admission, 12/15/2020.  Thought to be related to Eliquis.  · Eliquis was stopped.  · Hemorrhoidal repair by Dr. Flynn Julio, 12/23/2020  · Eliquis subsequently restarted with no more issues with bleeding.  · 1 episode of dark stools yesterday, 9/27/2021.  Told him if he notices more of this he should contact Dr. Sanchez.  · 3/22/2022: Denies any rectal bleeding.  States this has not really been an issue since the hemorrhoidal repair  · 8/30/2022: Denies bleeding  · 11/22/2022: Denies bleeding  · 5/16/2023: Denies bleeding    *9/28/2021: Change in stool frequency.  · Was having a bowel movement 3 times per week.  For the past week has been having 3 formed bowel movements per day.  I told him if this persists he should discuss with Dr. Sanchez.    *Source of iron deficiency.  · Last colonoscopy 2/26/2019 by Dr. Millan.  Last EGD 6/13/2017 by Dr. Gonzalez.  · Suspect he has an absorption issue due to previous esophageal surgery.    *B12 deficiency.  · On 6/6/2019, B12 <150  · On B12 injections through PCP.  · B12 on 5/5/2020 normal at 694  · B12 on 2/2/2021, 789  · 5/16/2023 B12 1124    *Anemia for reasons in addition to iron deficiency and B12 deficiency  · Baseline Hb mostly 10.5-11.5.  · On 5/5/2020, unremarkable: RBC folate, iron labs, B12, haptoglobin, TB, LDH, direct Maki.  · Creatinine baseline is 0.9-1.2   · Hb 9.9 on 5/5/2020  · On 7/7/2020, Hb up to 11.7.  Return to prior follow-up plan.  · On 2/2/2021,  B12 and RBC folate unremarkable  · 3/23/2021: Hb 10.8 despite normal iron stores.  · 5/25/2021, Hb 10.7 with normal iron labs  · 7/6/2021, Hb 10.8 with normal iron labs  · Hb 12, from 11, from 10.2, from 11.9, from 11.7, from 11.4    *Leukocytopenia  · New issue on 3/23/2021, WBC 3.38, based on recent labs, but WBC has been as low as 2.9 December 2019  · WBC 3.9, from 4.3, from 4.1, from 4.6, from 4    *Neutropenia  · ANC 1590 on 3/23/2021 (previously ANC has been as low as 1480 with WBC in the low normal range)  · ANC 2150, from 2160, from 2110, from 2560, from 1690    *Thrombocytopenia  · New issue on 3/23/2021, , based on recent labs, but PLT has been as low as 119 October 2019  · , from 163, from 155, from 174, from 158    *Dyspnea on exertion x2 weeks on 11/22/2022 visit  · Advised to discuss with his PCP and in the meantime if it worsens he should go to the ER    *Admitted early January 2023 for respiratory failure due to RSV.  Also had A-fib with RVR.    *He had a white blood cell count in the upper 3s and a hemoglobin in the upper 12s with a normal platelet count prior to beginning chemotherapy.     PLAN:  · MD CBC stat ferritin, iron panel, reticulate hemoglobin, B12 level 3 months  · (I offered 6-month follow-up but he prefers to maintain 3-month)  · Baseline Hb when not in the hospital mostly 9.5-11.5  · Continue Eliquis    · hemorrhoids have been repaired.  Last drop of Hb was down to 7.1 on 12/21/2020.  (Hemorrhoidal bleeding)    Prior plan was:  · MD every 6-month.  · No more surveillance CT scans.  · He does not have a port.    I have discussed with him if Hb drops and remains around 9 or so, we may want to plan a bone marrow biopsy

## 2023-06-09 ENCOUNTER — TREATMENT (OUTPATIENT)
Dept: PHYSICAL THERAPY | Facility: CLINIC | Age: 73
End: 2023-06-09
Payer: MEDICARE

## 2023-06-09 DIAGNOSIS — R29.898 OTHER SYMPTOMS AND SIGNS INVOLVING THE MUSCULOSKELETAL SYSTEM: Primary | ICD-10-CM

## 2023-06-09 DIAGNOSIS — Z74.09 IMPAIRED FUNCTIONAL MOBILITY, BALANCE, GAIT, AND ENDURANCE: ICD-10-CM

## 2023-06-09 DIAGNOSIS — R29.898 DECREASED STRENGTH OF LOWER EXTREMITY: ICD-10-CM

## 2023-06-09 NOTE — PROGRESS NOTES
Physical Therapy Initial Evaluation and Plan of Care  [facility] [place]  [address], [address]  [place] [address]  P: [phone]       F: [phone]      Patient: Jose Hurtado   : 1950  Visit Diagnoses:     ICD-10-CM ICD-9-CM   1. Other symptoms and signs involving the musculoskeletal system  R29.898 729.89   2. Impaired functional mobility, balance, gait, and endurance  Z74.09 V49.89   3. Decreased strength of lower extremity  R29.898 729.89     Referring practitioner: Brandin Bolton MD  Date of Initial Visit: 2023  Today's Date: 6/10/2023  Patient seen for 1 sessions           Subjective Questionnaire: Oswestry: , WOMAC 46/96      Subjective Evaluation    History of Present Illness  Date of onset: 2023  Mechanism of injury: Was in the hospital in January due to aspiration pneumonia for 5 days.   Reports he has been getting weak since.  Reports progressive difficulty with walking, unable to stand up straight.  Feels pain in his back when trying to stand up straight.  Had difficulty on stairs, getting up/down from chairs.     States he sleeps in bed with two pillows.      Denies any recent falls.      Reports he pinched a nerve in his neck 5 days ago and he has radial nerve palsy. Woke up with his right hand not working.  Had been doing a lot of work in his garage the prior days and thinks he strained his neck.      PMH: Copied from EMR: Hypertension, Congestive heart failure, NSTEMI (non-ST elevated myocardial infarction), Elevated troponin, Atrial fibrillation, History of recurrent deep vein thrombosis (DVT), History of DVT (deep vein thrombosis), Chronic venous hypertension due to DVT, Chronic anticoagulation, B12 deficiency, Gastroparesis, Adenomatous polyp of colon, Gastrointestinal hemorrhage, Bleeding hemorrhoid, Malabsorption of iron, Dysphagia, Genitourinary and Reproductive, CKD (chronic kidney disease) stage 2, GFR 60-89 ml/min, History of  esophageal cancer, Anemia, Iron deficiency, Malignant neoplasm of prostate, Acute blood loss anemia, Pancytopenia, Iron deficiency anemia, History of esophageal cancer, Recurrent major depressive disorder, in partial remission, Insomnia due to other mental disorder, RLS (restless legs syndrome), Peripheral polyneuropathy, Tremor of both hands, Chronic obstructive pulmonary disease, Bronchitis, Gait instability, Lymphedema, Abnormal CT scan, Generalized weakness, Dyspnea    Past Surgical History:  Procedure Laterality Date  · APPENDECTOMY   1950  · BRONCHOSCOPY        (Diagnostic)  · CARDIAC CATHETERIZATION N/A 5/14/2022    Procedure: Left Heart Cath;  Surgeon: Eric So MD;  Location: [facility] CATH INVASIVE LOCATION;  Service: Cardiology;  Laterality: N/A;  · CARDIAC CATHETERIZATION N/A 5/14/2022    Procedure: Coronary angiography;  Surgeon: Eric So MD;  Location: [facility] CATH INVASIVE LOCATION;  Service: Cardiology;  Laterality: N/A;  · CARDIAC CATHETERIZATION N/A 5/14/2022    Procedure: Left ventriculography;  Surgeon: Eric So MD;  Location: [facility] CATH INVASIVE LOCATION;  Service: Cardiology;  Laterality: N/A;  · CATARACT EXTRACTION Bilateral 2014  · COLONOSCOPY   12/15/2014    Complete / Description: EH, IH, torts, stool, follow-up colonoscopy due in 5 years.  · COLONOSCOPY N/A 6/13/2017    non-thrombosed external hemorrhoids, normal examined ileum, IH  · COLONOSCOPY N/A 2/26/2019    Procedure: COLONOSCOPY with Cold Polypectomy;  Surgeon: Mulugeta Millan MD;  Location: [facility] ENDOSCOPY;  Service: Gastroenterology  · COLONOSCOPY N/A 12/16/2020    Procedure: COLONOSCOPY to cecum into TI;  Surgeon: Mesha Sanchez MD;  Location: [facility] ENDOSCOPY;  Service: Gastroenterology;  Laterality: N/A;  pre: lower GI bleed  post: hemorrhoids   · CYSTOSCOPY W/ LASER LITHOTRIPSY      · ENDOSCOPY N/A 6/13/2017    Procedure: ESOPHAGOGASTRODUODENOSCOPY WITH COLD BIOPSY;  Surgeon:  Lake Gonzalez MD;  Location: [facility] ENDOSCOPY;  Service:   · ENDOSCOPY N/A 11/18/2021    Procedure: ESOPHAGOGASTRODUODENOSCOPY WITH  DILATATION WITH FLUOROSCOPY;  Surgeon: Jose Christine III, MD;  Location: [facility] ENDOSCOPY;  Service: Gastroenterology;  Laterality: N/A;  Pre: dysphagia, h/x of adinocarcinoma of esophagus  Post: same  · ESOPHAGECTOMY        April 2015, stage IIB esophageal carcinoma, sub-total resection.  · ESOPHAGECTOMY        Esophagectomy Subtotal [place] Procedure  · EXCISION LESION   08/2012    Removal of Squamous Cell CA on Head  · HAMMER TOE REPAIR   09/2014    Hammertoe Operation (Each Toe), 10/2014  · HAMMER TOE REPAIR Left 10/3/2017    Procedure: Left second third and fourth distal interphalangeal joint resection with flexor tenotomy;  Surgeon: Mulugeta Lira MD;  Location: [facility] OR OSC;  Service:   · HEMORRHOIDECTOMY N/A 12/23/2020    Procedure: HEMORRHOIDECTOMY;  Surgeon: Billy Julio Jr., MD;  Location: [facility] MAIN OR;  Service: General;  Laterality: N/A;  · HERNIA REPAIR        incisional  · JEJUNOSTOMY        Laparoscopic  · JEJUNOSTOMY        tube removal   · KNEE SURGERY Bilateral 1967, 1973, 1981  · PATELLA SURGERY Left      removed  · PILONIDAL CYST / SINUS EXCISION      · RI ARTHRP KNE CONDYLE&PLATU MEDIAL&LAT COMPARTMENTS Right 3/26/2018    Procedure: TOTAL KNEE ARTHROPLASTY;  Surgeon: Renny Solis MD;  Location: [facility] OR;  Service: Orthopedics  · PROSTATECTOMY   2010  · PYLOROPLASTY      · SPINAL FUSION   02/1998    C 5,6  · TONSILLECTOMY      · UPPER GASTROINTESTINAL ENDOSCOPY   12/15/2014    LA Grade D esophagitis, Pardo's, HH, multiple duodenal ulcers  · VENTRAL/INCISIONAL HERNIA REPAIR N/A 4/14/2016    Procedure: VENTRAL/INCISIONAL HERNIA REPAIR, open, with mesh, and component separation;  Surgeon: Darren Rivas MD;  Location: [facility] MAIN OR;  Service:           Subjective comment: Patient reports difficulty walking, poor balance  and back pain.  Patient Occupation: Retired Pain  At best pain ratin (sitting at rest)  At worst pain ratin  Location: low back  Quality: dull ache (intermittent)  Relieving factors: heat  Aggravating factors: standing, ambulation, squatting and stairs (standing 10 min or more)  Progression: worsening (declining strength)    Social Support  Patient lives at: [facility].  Lives with: spouse    Hand dominance: right    Patient Goals  Patient goals for therapy: improved balance, decreased pain and increased strength  Patient goal: Improved posture and walking abilities.         Objective          Palpation     Additional Palpation Details  No TTP    Active Range of Motion     Lumbar   Flexion: WFL  Extension: WFL  Left lateral flexion: WFL  Right lateral flexion: WFL  Left rotation: WFL  Right rotation: WFL  Left Hip   Flexion: 120 degrees   Extension: 10 (Lag) degrees   Abduction: 10 degrees   External rotation (90/90): 12 degrees   Internal rotation (90/90): 45 degrees     Right Hip   Flexion: 115 degrees   Extension: 10 (Lag) degrees   Abduction: 11 degrees   External rotation (90/90): 25 degrees   Internal rotation (90/90): 30 degrees   Left Knee   Extensor la degrees     Right Knee   Extensor la degrees     Strength/Myotome Testing     Left Hip   Planes of Motion   Flexion: 4  Extension: 3+  Abduction: 3+  External rotation: 3+  Internal rotation: 3+    Right Hip   Planes of Motion   Flexion: 4-  Extension: 3+  Abduction: 3+  External rotation: 3+  Internal rotation: 3+    Left Knee   Flexion: 5  Extension: 5    Right Knee   Flexion: 5  Extension: 5    Left Ankle/Foot   Dorsiflexion: 4    Right Ankle/Foot   Dorsiflexion: 4    Tests     Left Hip   Modified Jose: Positive.     Right Hip   Modified Jose: Positive.     Ambulation   Weight-Bearing Status   Assistive device used: single point cane    Observational Gait   Gait: asymmetric   Decreased walking speed.     Quality of Movement During  Gait   Trunk  Forward lean.     Functional Assessment   Squat   Left valgus, left tibial anterior translation beyond toes, right valgus and right tibial anterior translation beyond toes.         Assessment & Plan     Assessment  Impairments: abnormal gait, abnormal or restricted ROM, activity intolerance, impaired balance, impaired physical strength and pain with function  Functional Limitations: walking, uncomfortable because of pain and standing  Assessment details: Jose Hurtado is a pleasant 73 y.o. male that presents with decreased hip ROM, decreased core/LE muscle strength, impaired balance, impaired gait. Pt will benefit from skilled PT services in order to address listed impairments, improve balance, gait and restore function.    Prognosis: good  Prognosis details: Patient demonstrates good rehab potential as evidenced by high motivation to participate with PT POC and to return to PLOF.    Goals  Plan Goals: Short Term Goals (6 wks):  1.  Patient will have bilateral hip extension to 0 degrees.  2.  Patient will have bilateral hip flexion to 125 degrees.  3.  Patient will demonstrate improved posture and mechanics with ambulation using the least restrictive AD.  4.  Patient will be independent in performance of HEP for carryover upon discharge from skilled PT services.    Long Term Goals (12 wks):  1.  Patient will have increased hip strength to 4+/5.  2.  Patient will have improved Oswestry score of 15/50 or better.  3.  Patient will have improved WOMAC score of 35/96 or better.  4.  Patient will demonstrate improved squat form to all for functional squat pattern to ease burden of sit to stand from chairs/commode.    Plan  Therapy options: will be seen for skilled therapy services  Planned modality interventions: cryotherapy and thermotherapy (hydrocollator packs)  Planned therapy interventions: manual therapy, postural training, soft tissue mobilization, spinal/joint mobilization, strengthening,  stretching, flexibility, functional ROM exercises, home exercise program, neuromuscular re-education, therapeutic activities, gait training, body mechanics training and abdominal trunk stabilization  Frequency: 2x week  Duration in visits: 12  Treatment plan discussed with: patient  Plan details: Pt was educated on the importance of their HEP and their current need for continued skilled physical therapy. Patients goals and potential limitations were discussed and pt is in agreement with current plan of care and treatment emphasis.            History # of Personal Factors and/or Comorbidities: HIGH (3+)  Examination of Body System(s): # of elements: HIGH (4+)  Clinical Presentation: EVOLVING  Clinical Decision Making: HIGH      Timed:         Manual Therapy:         mins  28967;     Therapeutic Exercise:         mins  62561;     Neuromuscular Wil:        mins  52803;    Therapeutic Activity:     15     mins  55502;     Gait Training:      10     mins  32047;     Ultrasound:          mins  72831;    Ionto                                  mins  94284  Self Care                            mins  92216  Canalith Repos         mins  48263  Orthotic MGMT/Train         mins  21743    Un-Timed:  Electrical Stimulation:         mins  69270 ( );  Dry Needling:          mins  90616 self-pay;  Dry Needling:          mins  03364 self-pay  Traction          mins  75291  Low Eval          mins  16583  Mod Eval          mins  23435  High Eval                       40     mins  96415    Timed Treatment:   25   mins   Total Treatment:     65   mins      PT SIGNATURE: Mervat Ramachandran PT     License Number: [id number]  Electronically signed by Mervat Ramachandran PT, 06/09/23, 1:54 PM EDT      DATE TREATMENT INITIATED: 6/10/2023    Initial Certification  Certification Period: 6/10/2023 thru 9/7/2023  I certify that the therapy services are furnished while this patient is under my care.  The services outlined above are required by this  patient, and will be reviewed every 90 days.     PHYSICIAN:  Brandin Bolton MD     NPI: [id number]  DATE:         Please sign and return via fax to [phone]. Thank you, [facility].

## 2023-06-24 PROBLEM — R19.5 DARK STOOLS: Status: ACTIVE | Noted: 2023-06-24

## 2023-07-25 ENCOUNTER — TREATMENT (OUTPATIENT)
Dept: PHYSICAL THERAPY | Facility: CLINIC | Age: 73
End: 2023-07-25
Payer: MEDICARE

## 2023-07-25 DIAGNOSIS — R26.9 GAIT DISTURBANCE: ICD-10-CM

## 2023-07-25 DIAGNOSIS — R29.898 OTHER SYMPTOMS AND SIGNS INVOLVING THE MUSCULOSKELETAL SYSTEM: Primary | ICD-10-CM

## 2023-07-25 DIAGNOSIS — Z74.09 IMPAIRED FUNCTIONAL MOBILITY, BALANCE, GAIT, AND ENDURANCE: ICD-10-CM

## 2023-07-25 DIAGNOSIS — R29.898 DECREASED STRENGTH OF LOWER EXTREMITY: ICD-10-CM

## 2023-07-25 PROCEDURE — 97112 NEUROMUSCULAR REEDUCATION: CPT | Performed by: PHYSICAL THERAPIST

## 2023-07-25 PROCEDURE — 97530 THERAPEUTIC ACTIVITIES: CPT | Performed by: PHYSICAL THERAPIST

## 2023-07-25 PROCEDURE — 97110 THERAPEUTIC EXERCISES: CPT | Performed by: PHYSICAL THERAPIST

## 2023-07-25 NOTE — PROGRESS NOTES
Physical Therapy Daily Treatment Note  [facility] [place]   [address], [address]  [place] [address]  P: [phone]       F: [phone]    Patient: Jose Hurtado   : 1950  Diagnosis/ICD-10 Code:  Other symptoms and signs involving the musculoskeletal system [R29.898]  Referring practitioner: Brandin Bolton MD  Date of Initial Visit: Type: THERAPY  Noted: 2023  Today's Date: 2023  Patient seen for 7 sessions       Jose Hurtado reports: nothing new, doing okay. I got a Nustep and have been riding it 20 minutes a day.     Subjective     Objective   See Exercise, Manual, and Modality Logs for complete treatment.       Assessment/Plan  Subjectively, pt reports no increase of pain or discomfort with interventions performed today. Performed well with continued LE strengthening and stability interventions. Continues to demonstrate increased stooped posture with ambulation. Continues to benefit from verbal/tactile cues to ensure proper form and technique for exercise performance.     Progress per Plan of Care           Manual Therapy:         mins  80740;  Therapeutic Exercise:    20     mins  98488;     Neuromuscular Wil:    8    mins  17193;    Therapeutic Activity:     10     mins  38857;     Gait Training:           mins  27846;     Ultrasound:          mins  27997;    Electrical Stimulation:         mins  49722;  Traction          mins 34217    Timed Treatment:   38   mins   Total Treatment:     38   mins    Pattie Garnica PTA  Physical Therapist Assistant [id number]

## 2023-07-27 ENCOUNTER — OFFICE VISIT (OUTPATIENT)
Dept: INTERNAL MEDICINE | Age: 73
End: 2023-07-27
Payer: MEDICARE

## 2023-07-27 ENCOUNTER — TREATMENT (OUTPATIENT)
Dept: PHYSICAL THERAPY | Facility: CLINIC | Age: 73
End: 2023-07-27
Payer: MEDICARE

## 2023-07-27 VITALS
BODY MASS INDEX: 21.25 KG/M2 | HEART RATE: 79 BPM | HEIGHT: 77 IN | SYSTOLIC BLOOD PRESSURE: 130 MMHG | DIASTOLIC BLOOD PRESSURE: 80 MMHG | WEIGHT: 180 LBS | TEMPERATURE: 97.1 F | OXYGEN SATURATION: 98 %

## 2023-07-27 DIAGNOSIS — D50.9 IRON DEFICIENCY ANEMIA, UNSPECIFIED IRON DEFICIENCY ANEMIA TYPE: ICD-10-CM

## 2023-07-27 DIAGNOSIS — R29.898 OTHER SYMPTOMS AND SIGNS INVOLVING THE MUSCULOSKELETAL SYSTEM: Primary | ICD-10-CM

## 2023-07-27 DIAGNOSIS — R26.9 GAIT DISTURBANCE: ICD-10-CM

## 2023-07-27 DIAGNOSIS — I10 PRIMARY HYPERTENSION: Chronic | ICD-10-CM

## 2023-07-27 DIAGNOSIS — Z74.09 IMPAIRED FUNCTIONAL MOBILITY, BALANCE, GAIT, AND ENDURANCE: ICD-10-CM

## 2023-07-27 DIAGNOSIS — R29.898 DECREASED STRENGTH OF LOWER EXTREMITY: ICD-10-CM

## 2023-07-27 DIAGNOSIS — F33.41 RECURRENT MAJOR DEPRESSIVE DISORDER, IN PARTIAL REMISSION: Chronic | ICD-10-CM

## 2023-07-27 DIAGNOSIS — Z00.00 MEDICARE ANNUAL WELLNESS VISIT, SUBSEQUENT: Primary | ICD-10-CM

## 2023-07-27 NOTE — ASSESSMENT & PLAN NOTE
Patient stopped taking Eliquis and metoprolol on his own about a month ago.  I advised him to contact his cardiologist about resumption of medications.

## 2023-07-27 NOTE — ASSESSMENT & PLAN NOTE
Patient reports to having stopped furosemide 40 mg twice daily and metoprolol on its own within a month ago.  Clinically his CHF appears to be stable.  He was advised to contact his cardiologist regarding resumption of his usual cardiac meds.

## 2023-07-27 NOTE — PROGRESS NOTES
Physical Therapy Treatment Note  [facility] [place]   [address], [address]  [place] [address]  P: [phone]       F: [phone]      Patient: Jose Hurtado   : 1950  Treatment Diagnosis:     ICD-10-CM ICD-9-CM   1. Other symptoms and signs involving the musculoskeletal system  R29.898 729.89   2. Impaired functional mobility, balance, gait, and endurance  Z74.09 V49.89   3. Decreased strength of lower extremity  R29.898 729.89   4. Gait disturbance  R26.9 781.2     Referring practitioner: Brandin Bolton MD  Date of Initial Visit: Type: THERAPY  Noted: 2023  Today's Date: 2023  Patient seen for 8 sessions           Subjective   Patient reports he has started his new prepared meals plan.  Reports he is feeling better.    Objective     See Exercise, Manual, and Modality Logs for complete treatment.       Assessment/Plan  Patient performed program for strengthening and dynamic stability.  He tolerated progression of exercises well with no adverse symptoms.  He benefits from cueing for technique and to prevent compensatory patterns.    Progress per Plan of Care and Progress strengthening /stabilization /functional activity           Timed:         Manual Therapy:         mins  76798;     Therapeutic Exercise:    20     mins  42149;     Neuromuscular Wil:    15    mins  80501;    Therapeutic Activity:          mins  43938;     Gait Trainin     mins  78686;     Ultrasound:          mins  84452;    Ionto                                  mins  77552  Self Care                            mins  26008  Canalith Repos         mins  70019  Orthotic MGMT/Train         mins  00852    Un-Timed:  Electrical Stimulation:         mins  65988 ( );  Dry Needling:          mins  86318 self-pay;  Dry Needling:          mins  70861 self-pay  Traction          mins  87575      Timed Treatment:   43   mins   Total Treatment:     43   mins        PT SIGNATURE: Mervat RUIZ  D[place] PT     License Number: [id number]  Electronically signed by Mervat Ramachandran PT, 07/27/23, 2:11 PM EDT

## 2023-07-27 NOTE — ASSESSMENT & PLAN NOTE
He stopped taking paroxetine on his own about a month ago.  At this time depression remains stable.  I did advise him to resume medication.

## 2023-07-27 NOTE — PROGRESS NOTES
I N T E R N A L  M E D I C I N E    J U N O H  K I M,  M D      ENCOUNTER DATE:  07/27/2023    Jose Hurtado / 73 y.o. / male    MEDICARE ANNUAL WELLNESS VISIT       Chief Complaint:  AWV     Patient's general assessment of his health since a year ago:     - Compared to one year ago, he feels his physical health is:   [x] About the same  [] Better  [] Little worse  [] Significantly worse  []     - Compared to one year ago, he feels his mental health is   [x] About the same  [] Better  [] Little worse  [] Significantly worse  []     HPI for other active medical problems:     Patient presents today with his spouse.  Apparently he has stopped taking all his medications approximately a month ago.  Patient states that he feels fine and felt he did not need to continue the medications.  He denies any chest pains, increased shortness of breath or increased swelling of the lower extremities.  He was seen by the nurse practitioner in June for tarry looking stools and was noted to have a mild drop in hemoglobin level.  Fecal occult blood test was ordered however he never returned the test.  It appears the APRN placed an order for colonoscopy which has not been scheduled.  Patient states that his usual GI specialist is Dr. Millan.  He denies any recurrent episode of melena or black-colored stool and he denies any maroon or red blood in the stool.  Denies abdominal pain.  Denies excessive lightheadedness or increasing weakness.  He was taking Eliquis previously.  He has stopped taking furosemide which he was taking at 40 mg twice daily.  He also stopped metoprolol for hypertension.  He has prior history of non-ST elevation MI and is followed by his cardiologist.  He also has noted history of congestive heart failure.  He has history of major depression but as stated above he stopped taking paroxetine as well.  He denies worsening depression symptoms or suicidal ideations.      HISTORY       Recent  Hospitalizations:    Recent hospitalization?:     If YES, location, date, and diagnoses:     Location:   Date:   Principle Discharge Dx:   Secondary Dx:       Patient Care Team:    Patient Care Team:  Brandin Bolton MD as PCP - General (Internal Medicine)  George Phelan II, MD as Consulting Physician (Hematology and Oncology)  Jose Inman MD as Consulting Physician (Urology)  Mulugeta Millan MD as Consulting Physician (Gastroenterology)  Jose Christine III, MD as Referring Physician (Thoracic Surgery)  Seth Randhawa MD as Consulting Physician (Ophthalmology)  James Cyr MD as Consulting Physician (Cardiology)  Stephon Sommers MD as Consulting Physician (Sleep Medicine)  Rolanda Solomon MD as Consulting Physician (Nephrology)      Allergies:  Patient has no known allergies.    Medications:  Current Outpatient Medications on File Prior to Visit   Medication Sig Dispense Refill    albuterol sulfate  (90 Base) MCG/ACT inhaler Inhale 1 puff Every 4 (Four) Hours As Needed for Wheezing. (Patient not taking: Reported on 7/27/2023)      atorvastatin (LIPITOR) 40 MG tablet Take 1 tablet by mouth Every Night. (Patient not taking: Reported on 7/27/2023) 90 tablet 3    cyanocobalamin 1000 MCG/ML injection INJECT 1 ML INTRAMUSCULARLY ONCE EVERY 30 DAYS AS DIRECTED (Patient not taking: Reported on 7/27/2023) 1 mL 11    Eliquis 5 MG tablet tablet TAKE ONE TABLET BY MOUTH EVERY 12 HOURS (Patient not taking: Reported on 7/27/2023) 60 tablet 1    furosemide (LASIX) 40 MG tablet Take 1 tablet by mouth 2 (Two) Times a Day. (Patient not taking: Reported on 7/27/2023)      Gemtesa 75 MG tablet Daily. (Patient not taking: Reported on 7/27/2023)      metoprolol tartrate (LOPRESSOR) 50 MG tablet Take 1 tablet by mouth 2 (Two) Times a Day for 60 days. (Patient not taking: Reported on 7/27/2023) 60 tablet 1    pantoprazole (PROTONIX) 40 MG EC tablet TAKE ONE TABLET BY MOUTH TWICE A DAY BEFORE A MEAL  (Patient not taking: Reported on 7/27/2023) 180 tablet 3    PARoxetine (PAXIL) 40 MG tablet TAKE ONE TABLET BY MOUTH EVERY MORNING (Patient not taking: Reported on 7/27/2023) 30 tablet 5    potassium chloride (MICRO-K) 10 MEQ CR capsule Take 1 capsule by mouth Daily. (Patient not taking: Reported on 7/27/2023) 90 capsule 5    pramipexole (MIRAPEX) 1.5 MG tablet TAKE ONE TABLET BY MOUTH TWICE A DAY (Patient not taking: Reported on 7/27/2023) 180 tablet 1    tiotropium bromide monohydrate (Spiriva Respimat) 2.5 MCG/ACT aerosol solution inhaler Inhale 2 puffs Daily. (Patient not taking: Reported on 7/27/2023) 4 g 2     No current facility-administered medications on file prior to visit.        PFSH:     The following portions of the patient's history were reviewed and updated as appropriate: Allergies / Current Medications / Past Medical History / Surgical History / Social History / Family History    Problem List:  Patient Active Problem List   Diagnosis    History of esophageal cancer    Adenomatous polyp of colon    Malignant neoplasm of prostate    RLS (restless legs syndrome)    History of DVT (deep vein thrombosis)    Recurrent major depressive disorder, in partial remission    Hypertension    Anemia    Insomnia due to other mental disorder    Peripheral polyneuropathy    B12 deficiency    Lymphedema    Iron deficiency    Gastroparesis    History of recurrent deep vein thrombosis (DVT)    Tremor of both hands    Gastrointestinal hemorrhage    Acute blood loss anemia    Pancytopenia    Chronic venous hypertension due to DVT    Bleeding hemorrhoid    Malabsorption of iron    Iron deficiency anemia    History of esophageal cancer    Abnormal CT scan    Generalized weakness    Chronic anticoagulation    CKD (chronic kidney disease) stage 2, GFR 60-89 ml/min    Dysphagia    NSTEMI (non-ST elevated myocardial infarction)    Bronchitis    Dyspnea    Elevated troponin    Atrial fibrillation    Chronic obstructive  pulmonary disease    Congestive heart failure    Gait instability    Dark stools       Past Medical History:  Past Medical History:   Diagnosis Date    Acute deep vein thrombosis (DVT) of popliteal vein of left lower extremity 03/24/2020    Anemia     Anti-phospholipid syndrome     On lifelong anticoagulation therapy    Bladder disorder     LEAKAGE   ON MED  wers pads    Bleeding hemorrhoids     Chronic kidney disease     Community acquired pneumonia     HISTORY OF IN 2014    Congestive heart failure     COPD (chronic obstructive pulmonary disease)     Deep venous thrombosis 2006, 2008    Left lower extremity multiple    Depression     Esophageal carcinoma 12/31/2014    had chemo and radiation prior surgery    Hemorrhoids     HH (hiatus hernia)     History of atrial fibrillation 2015    ONE EPISODE WHILE HOSPITALIZED    History of kidney stones     History of nephrolithiasis     History of pancreatitis     PT STATES MANY YEARS AGO    History of radiation therapy     History of transfusion     Hypertension     Long-term (current) use of anticoagulants, INR goal 2.0-3.0     Lymphedema     Malignant neoplasm of prostate     Other hyperlipidemia 01/30/2018 January 30, 2018 lipid panel risk 12.8%    Restless legs syndrome     Sleep apnea     OCCASIONALLY WEARS CPAP    Squamous carcinoma     on the head       Past Surgical History:  Past Surgical History:   Procedure Laterality Date    APPENDECTOMY  1950    BRONCHOSCOPY      (Diagnostic)    CARDIAC CATHETERIZATION N/A 5/14/2022    Procedure: Left Heart Cath;  Surgeon: Eric So MD;  Location:  [facility] CATH INVASIVE [facility];  Service: Cardiology;  Laterality: N/A;    CARDIAC CATHETERIZATION N/A 5/14/2022    Procedure: Coronary angiography;  Surgeon: Eric So MD;  Location:  [facility]AS[facility];  Service: Cardiology;  Laterality: N/A;    CARDIAC CATHETERIZATION N/A 5/14/2022    Procedure: Left ventriculography;  Surgeon: Judah  Eric ARRIOLA MD;  Location: [facility] CATH INVASIVE LOCATION;  Service: Cardiology;  Laterality: N/A;    CATARACT EXTRACTION Bilateral 2014    COLONOSCOPY  12/15/2014    Complete / Description: EH, IH, torts, stool, follow-up colonoscopy due in 5 years.    COLONOSCOPY N/A 6/13/2017    non-thrombosed external hemorrhoids, normal examined ileum, IH    COLONOSCOPY N/A 2/26/2019    Procedure: COLONOSCOPY with Cold Polypectomy;  Surgeon: Mulugeta Millan MD;  Location: [facility] ENDOSCOPY;  Service: Gastroenterology    COLONOSCOPY N/A 12/16/2020    Procedure: COLONOSCOPY to cecum into TI;  Surgeon: Mesha Sanchez MD;  Location: [facility] ENDOSCOPY;  Service: Gastroenterology;  Laterality: N/A;  pre: lower GI bleed  post: hemorrhoids     CYSTOSCOPY W/ LASER LITHOTRIPSY      ENDOSCOPY N/A 6/13/2017    Procedure: ESOPHAGOGASTRODUODENOSCOPY WITH COLD BIOPSY;  Surgeon: Lake Gonzalez MD;  Location: [facility] ENDOSCOPY;  Service:     ENDOSCOPY N/A 11/18/2021    Procedure: ESOPHAGOGASTRODUODENOSCOPY WITH  DILATATION WITH FLUOROSCOPY;  Surgeon: Jose Christine III, MD;  Location: [facility] ENDOSCOPY;  Service: Gastroenterology;  Laterality: N/A;  Pre: dysphagia, h/x of adinocarcinoma of esophagus  Post: same    ESOPHAGECTOMY      April 2015, stage IIB esophageal carcinoma, sub-total resection.    ESOPHAGECTOMY      Esophagectomy Subtotal [place] Procedure    EXCISION LESION  08/2012    Removal of Squamous Cell CA on Head    HAMMER TOE REPAIR  09/2014    Hammertoe Operation (Each Toe), 10/2014    HAMMER TOE REPAIR Left 10/3/2017    Procedure: Left second third and fourth distal interphalangeal joint resection with flexor tenotomy;  Surgeon: Mulugeta Lira MD;  Location: [facility] OR OSC;  Service:     HEMORRHOIDECTOMY N/A 12/23/2020    Procedure: HEMORRHOIDECTOMY;  Surgeon: Billy Julio Jr., MD;  Location: [facility] MAIN OR;  Service: General;  Laterality: N/A;    HERNIA REPAIR      incisional    JEJUNOSTOMY       Laparoscopic    JEJUNOSTOMY      tube removal     KNEE SURGERY Bilateral , ,     PATELLA SURGERY Left     removed    PILONIDAL CYST / SINUS EXCISION      NC ARTHRP KNE CONDYLE&PLATU MEDIAL&LAT COMPARTMENTS Right 3/26/2018    Procedure: TOTAL KNEE ARTHROPLASTY;  Surgeon: Renny Solis MD;  Location: [facility];  Service: Orthopedics    PROSTATECTOMY      PYLOROPLASTY      SPINAL FUSION  1998    C 5,6    TONSILLECTOMY      UPPER GASTROINTESTINAL ENDOSCOPY  12/15/2014    LA Grade D esophagitis, Pardo's, HH, multiple duodenal ulcers    VENTRAL/INCISIONAL HERNIA REPAIR N/A 2016    Procedure: VENTRAL/INCISIONAL HERNIA REPAIR, open, with mesh, and component separation;  Surgeon: Darren Rivas MD;  Location: [facility];  Service:        Social History:  Social History     Socioeconomic History    Marital status:      Spouse name: Lena    Number of children: 0    Years of education: College   Tobacco Use    Smoking status: Former     Packs/day: 2.00     Years: 3.00     Pack years: 6.00     Types: Cigarettes     Quit date:      Years since quittin.6    Smokeless tobacco: Former     Types: Chew    Tobacco comments:     Caffeine - coffee and soda    Vaping Use    Vaping Use: Never used   Substance and Sexual Activity    Alcohol use: Yes     Alcohol/week: 3.0 standard drinks     Types: 1 Glasses of wine, 1 Cans of beer, 1 Shots of liquor per week     Comment: 2    Sexual activity: Defer       Family History:  Family History   Problem Relation Age of Onset    Cerebral aneurysm Mother         cerebral artery aneurysm ( age 56)    Anxiety disorder Father     Suicide Attempts Father          of suicide    Cancer Father         bladder    Prostate cancer Brother 68    No Known Problems Brother     Pancreatic cancer Nephew     Colon cancer Neg Hx     Esophageal cancer Neg Hx     Dementia Neg Hx     Malig Hyperthermia Neg Hx          PATIENT ASSESSMENT  "    Vitals:  Vitals:    07/27/23 1015   BP: 130/80   Pulse: 79   Temp: 97.1 °F (36.2 °C)   SpO2: 98%   Weight: 81.6 kg (180 lb)   Height: 195.6 cm (77.01\")       BP Readings from Last 3 Encounters:   07/27/23 130/80   06/23/23 118/64   05/16/23 132/62     Wt Readings from Last 3 Encounters:   07/27/23 81.6 kg (180 lb)   06/23/23 85.5 kg (188 lb 6.4 oz)   05/16/23 84.1 kg (185 lb 6.4 oz)      Body mass index is 21.34 kg/m².    Blood pressure readings recorded on patient flowsheet:       No data to display                    Review of Systems:    Review of Systems  Weight remains stable  Denies chest pain, palpitations, AGUILERA, increased edema  Denies increased cough or shortness of breath   GI negative for recurrent tarry stool, abdominal pain, red/maroon stools   negative for change  Neuro negative for change  Psych denies suicidal ideation or worsening depression     Physical Exam:    Physical Exam  General: No acute distress  Psych: Normal thought and judgment (grossly); no suicidal ideation or thoughts   Cardiovascular Rate: normal. Rhythm: regular. Heart sounds: normal   Pulm/Chest: Effort normal, breath sounds normal.   2+ bilateral lower extremities edema   Abdomen: Soft and nontender.     Reviewed Data:    Labs:   Lab Results   Component Value Date     06/23/2023    K 4.6 06/23/2023    CALCIUM 9.0 06/23/2023    AST 18 06/23/2023    ALT 14 06/23/2023    BUN 18 06/23/2023    CREATININE 1.26 06/23/2023    CREATININE 1.65 (H) 05/12/2023    CREATININE 1.50 (H) 03/29/2023    EGFRIFNONA 62 06/04/2021    EGFRIFAFRI 75 06/04/2021       Lab Results   Component Value Date    GLU 91 04/16/2018    HGBA1C 5.50 08/06/2021    HGBA1C 6.20 (H) 01/17/2020       Lab Results   Component Value Date    LDL 47 06/23/2023    LDL 52 05/12/2023    LDL 98 06/02/2022    HDL 62 (H) 06/23/2023    TRIG 36 06/23/2023    CHOLHDLRATIO 2.23 05/12/2023       Lab Results   Component Value Date    TSH 1.600 05/12/2023    FREET4 1.36 " 05/12/2023       Lab Results   Component Value Date    WBC 4.45 06/23/2023    HGB 10.6 (L) 06/23/2023    HGB 12.0 (L) 05/16/2023    HGB 11.0 (L) 02/15/2023     06/23/2023                 No results found for: PSA    Imaging:          Medical Tests:          Screening for Glaucoma:  Previous screening for glaucoma?:   [x] YES  [] NO  []     Hearing Loss Screen:  Finger Rub Hearing Test (right ear):   [] PASSED  [x] FAILED  [] BORDERLINE  [] HAS HEARING AID  [] USING HEARING AID  [x] NOT USING HEARING AID  []     Finger Rub Hearing Test (left ear):   [] PASSED  [x] FAILED  [] BORDERLINE  [] HAS HEARING AID  [] USING HEARING AID  [x] NOT USING HEARING AID  []     Urinary Incontinence Screen:  Episodes of urinary incontinence? :  [] YES  [x] NO  [] N/A  [] WILL NEED FOLLOWUP  []       Depression Screen:      7/27/2023    11:00 AM   PHQ-2/PHQ-9 Depression Screening   Little Interest or Pleasure in Doing Things 0-->not at all   Feeling Down, Depressed or Hopeless 0-->not at all   PHQ-9: Brief Depression Severity Measure Score 0        PHQ-2:   [] 0 -      NOT DEPRESSED  [] 1 -      NOT DEPRESSED  [] 2 -      NOT DEPRESSED  [] 3-6 -  DEPRESSED (PROCEED TO PHQ-9)  [] N/A - HAS DEPRESSION AND IS ON TREATMENT  [x] N/A - HAS DEPRESSION AND IS NOT ON TREATMENT    PHQ-9:   [x] 0         NEGATIVE SCREENING FOR DEPRESSION  [] 1-4      MINIMAL DEPRESSION  [] 5-9      MILD DEPRESSIONNOT DEPRESSED  [] 10-14  MODERATE DEPRESSION  [] 15-19  MODERATELY SEVERE DEPRESSION  [] 20-27  SEVERE DEPRESSION    FUNCTIONAL, FALL RISK, & COGNITIVE SCREENING (Components below):    DATA:        7/27/2023    11:00 AM   Functional & Cognitive Status   Do you have difficulty preparing food and eating? No   Do you have difficulty bathing yourself, getting dressed or grooming yourself? No   Do you have difficulty using the toilet? No   Do you have difficulty moving around from place to place? Yes   Do you have trouble with steps or getting out of a  bed or a chair? No   Current Diet Well Balanced Diet   Dental Exam Up to date   Eye Exam Up to date   Exercise (times per week) 5 times per week        Exercise Comment PT and personal training   Do you need help using the phone?  No   Are you deaf or do you have serious difficulty hearing?  Yes   Do you need help to go to places out of walking distance? No   Do you need help shopping? No   Do you need help preparing meals?  No   Do you need help with housework?  No   Do you need help with laundry? No   Do you need help taking your medications? No   Do you need help managing money? No   Do you ever drive or ride in a car without wearing a seat belt? No   Who do you live with? Spouse   Do you have difficulty concentrating, remembering or making decisions? Yes         A) Assessment of Functional Ability:  (Assessment of ability to perform ADL's (showering/bathing, using toilet, dressing, feeding self, moving self around) and IADL's (use telephone, shop, prepare food, housekeep, do laundry, transport independently, take medications independently, and handle finances)    Degree of functional impairment: (based on assessment noted above)  [] NONE  [] MILD  [x] MODERATE  [] SEVERE  [] VERY SEVERE  []     B) Assessment of Fall Risk:  [] LOW  [] MODERATE  [x] HIGH  [] VERY HIGH     Need for further evaluation of gait, strength, and balance? :  [x] YES  [] NO    Timed Up and Go (TUG):   (>= 12 seconds indicates high risk for falling)    Observable abnormalities included:  [] NORMAL GAIT   [] SLOW CAUTIOUS PACE  [] IMPAIRED BALANCE  [] SHORT STRIDES  [] MINIMAL ARM SWING  [x] UNSTEADY (MILD)  [] UNSTEADY (MODERATE)  [] UNSTEADY (SEVERE)  [] SHUFFLING GAIT  [x] USES ASSISTIVE DEVICE (CANE) PROPERLY  [] USES ASSISTIVE DEVICE (WALKER) PROPERLY  [] USES ASSISTIVE DEVICE (CANE) PROPERLY  [] DOES NOT USE ASSISTIVE DEVICE PROPERLY  [] IN WHEELCHAIR   [] NOT ABLE TO BE TESTED DUE TO SAFETY CONCERNS  []     C. Assessment of Cognitive  Function:    Mini-Cog Test:     1) Registration (3 objects):     [x] YES  [] NO   [] N/A HAS DOCUMENTED DEMENTIA     2) Number of objects recalled:   [] 3  [x] 2  [] 1  [] 0     3) Clock Draw: Passed? :   [] YES  [] NO   [x] N/A  [] N/A HAS DOCUMENTED DEMENTIA     Further evaluation required? :   [] YES  [] NO   [x] CLOSE OBSERVATION NEEDED   [] SCHEDULE APPOINTMENT TO EVALUATE FURTHER   [] CONTINUE REGULAR FOLLOWUP AND MANAGEMENT   []     COUNSELING       A. Identification of Health Risk Factors:    Risk factors include: cardiovascular risk factors, polypharmacy, cognitive impairment, increased fall risk, depression / other psychiatric problems, poor hearing, and caretaker stress      B. Age-Appropriate Screening Schedule:  (Refer to the list below for future screening recommendations based on patient's age, sex and/or medical conditions. Orders for these recommended tests are listed in the plan section. The patient has been provided with a written plan)    Health Maintenance Topics  Health Maintenance   Topic Date Due    COVID-19 Vaccine (7 - Pfizer series) 02/03/2023    INFLUENZA VACCINE  10/01/2023    COLORECTAL CANCER SCREENING  12/16/2023    LIPID PANEL  06/23/2024    ANNUAL WELLNESS VISIT  07/27/2024    TDAP/TD VACCINES (3 - Td or Tdap) 04/12/2028    Pneumococcal Vaccine 65+  Completed    AAA SCREEN (ONE-TIME)  Completed    ZOSTER VACCINE  Completed    HEPATITIS C SCREENING  Addressed       Health Maintenance Topics Due or Over-Due  Health Maintenance Due   Topic Date Due    COVID-19 Vaccine (7 - Pfizer series) 02/03/2023         C. Advanced Care Planning:    Advance Care Planning   ACP discussion was held with the patient during this visit. Patient has an advance directive in EMR which is still valid.        D. Patient Self-Management and Personalized Health Advice:    He has been provided with personalized counseling/information (including brochures/handouts) about:     -- reducing risk for cardiac/vascular  events with pre-existing disease, fall prevention, evidence of cognitive problems and need for ongoing surveillance for progressive changes, mental health concerns (depression/anxiety), and managing depression/anxiety      He has been recommended for the following preventative services which has been performed today, will be ordered today or ordered/performed on upcoming follow-up visit:     -- NUTRITION counseling provided, EXERCISE counseling provided, CARDIOVASCULAR disease risk reduction counseling performed, FALL RISK assessment / plan of care completed, URINARY incontinence assessment done, has hx of prostate cancer and is monitored by urologist/here with annual PSA, **COLORECTAL cancer screening (colonoscopy/Cologuard discussed or ordered), COVID-19 vaccination (and/or booster) recommendations provided, DIABETES screening performed (current/reviewed labs/lab ordered)      E. Miscellaneous Items:    -Aspirin use counseling: Does not need ASA (and currently is not on it)    -Discussed BMI with him. The BMI is in the acceptable range    -Reviewed use of high risk medication in the elderly: YES    -Reviewed for potential of harmful drug interactions in the elderly: YES        WRAP UP       Assessment & Plan:    1) MEDICARE ANNUAL WELLNESS VISIT    2) OTHER MEDICAL CONDITIONS ADDRESSED TODAY:            Problem List Items Addressed This Visit          High    Recurrent major depressive disorder, in partial remission (Chronic)    Current Assessment & Plan     He stopped taking paroxetine on his own about a month ago.  At this time depression remains stable.  I did advise him to resume medication.         Relevant Medications    PARoxetine (PAXIL) 40 MG tablet       Medium    Hypertension (Chronic)    Current Assessment & Plan     BP Readings from Last 3 Encounters:   07/27/23 130/80   06/23/23 118/64   05/16/23 132/62     He has stopped taking metoprolol on his own a month ago but blood pressure remained stable for  now.  He was advised to resume medication.         Relevant Medications    furosemide (LASIX) 40 MG tablet    metoprolol tartrate (LOPRESSOR) 50 MG tablet       Unprioritized    Iron deficiency anemia    Relevant Medications    cyanocobalamin 1000 MCG/ML injection    Other Relevant Orders    Hemoglobin & Hematocrit, Blood    Occult Blood, Fecal By Immunoassay - Stool, Per Rectum     Other Visit Diagnoses       Medicare annual wellness visit, subsequent    -  Primary                      Orders Placed This Encounter   Procedures    Hemoglobin & Hematocrit, Blood    Occult Blood, Fecal By Immunoassay - Stool, Per Rectum       Discussion / Summary:        Medications as of TODAY:              Current Outpatient Medications   Medication Sig Dispense Refill    albuterol sulfate  (90 Base) MCG/ACT inhaler Inhale 1 puff Every 4 (Four) Hours As Needed for Wheezing. (Patient not taking: Reported on 7/27/2023)      atorvastatin (LIPITOR) 40 MG tablet Take 1 tablet by mouth Every Night. (Patient not taking: Reported on 7/27/2023) 90 tablet 3    cyanocobalamin 1000 MCG/ML injection INJECT 1 ML INTRAMUSCULARLY ONCE EVERY 30 DAYS AS DIRECTED (Patient not taking: Reported on 7/27/2023) 1 mL 11    Eliquis 5 MG tablet tablet TAKE ONE TABLET BY MOUTH EVERY 12 HOURS (Patient not taking: Reported on 7/27/2023) 60 tablet 1    furosemide (LASIX) 40 MG tablet Take 1 tablet by mouth 2 (Two) Times a Day. (Patient not taking: Reported on 7/27/2023)      Gemtesa 75 MG tablet Daily. (Patient not taking: Reported on 7/27/2023)      metoprolol tartrate (LOPRESSOR) 50 MG tablet Take 1 tablet by mouth 2 (Two) Times a Day for 60 days. (Patient not taking: Reported on 7/27/2023) 60 tablet 1    pantoprazole (PROTONIX) 40 MG EC tablet TAKE ONE TABLET BY MOUTH TWICE A DAY BEFORE A MEAL (Patient not taking: Reported on 7/27/2023) 180 tablet 3    PARoxetine (PAXIL) 40 MG tablet TAKE ONE TABLET BY MOUTH EVERY MORNING (Patient not taking: Reported  on 7/27/2023) 30 tablet 5    potassium chloride (MICRO-K) 10 MEQ CR capsule Take 1 capsule by mouth Daily. (Patient not taking: Reported on 7/27/2023) 90 capsule 5    pramipexole (MIRAPEX) 1.5 MG tablet TAKE ONE TABLET BY MOUTH TWICE A DAY (Patient not taking: Reported on 7/27/2023) 180 tablet 1    tiotropium bromide monohydrate (Spiriva Respimat) 2.5 MCG/ACT aerosol solution inhaler Inhale 2 puffs Daily. (Patient not taking: Reported on 7/27/2023) 4 g 2     No current facility-administered medications for this visit.         FOLLOW-UP:            Return in about 2 months (around 9/27/2023) for Reassess chronic medical problems.              Future Appointments   Date Time Provider Department Center   7/27/2023  2:00 PM Mervat Ramachandran, PT [facility]   8/15/2023  1:40 PM LAB CHAIR 1 [facility]   8/15/2023  2:00 PM George Phelan II, [facility]   9/18/2023  1:30 PM Yumi Grider APRN [facility]   9/27/2023  3:00 PM Brandin Bolton MD [facility]   3/25/2024 12:30 PM Darian Maldonado Jr., MD [facility]   4/4/2024 10:30 AM [facility] CT 3 [facility]   4/17/2024  1:00 PM Verito Julio APRN [facility]           After Visit Summary (AVS) including the Personalized Prevention  Plan Services (PPPS) was either printed and given to the patient at check-out today and/or sent to [facility] for review.

## 2023-07-27 NOTE — ASSESSMENT & PLAN NOTE
BP Readings from Last 3 Encounters:   07/27/23 130/80   06/23/23 118/64   05/16/23 132/62       He has stopped taking metoprolol on his own a month ago but blood pressure remained stable for now.  He was advised to resume medication.

## 2023-07-27 NOTE — ASSESSMENT & PLAN NOTE
Check fecal occult blood test.  Check H&H today.  He was advised to discuss with Dr. Millan regarding further evaluation for isolated episodes of tarry appearing stools with mild decrease in hemoglobin level.  He may need an EGD plus minus colonoscopy.

## 2023-07-28 ENCOUNTER — TELEPHONE (OUTPATIENT)
Dept: INTERNAL MEDICINE | Age: 73
End: 2023-07-28
Payer: MEDICARE

## 2023-07-28 LAB
HCT VFR BLD AUTO: 33.8 % (ref 37.5–51)
HGB BLD-MCNC: 11.1 G/DL (ref 13–17.7)

## 2023-07-28 NOTE — TELEPHONE ENCOUNTER
[facility]IC[facility] INSTRUCTING PT TO RETURN CALL TO BE READ LAB RESULTS. LETTER SENT VIA [employer]

## 2023-07-28 NOTE — TELEPHONE ENCOUNTER
----- Message from Brandin Bolton MD sent at 7/28/2023  6:32 AM EDT -----  CALL PATIENT with results:    Hemoglobin level is improving since last time. Return FOBT as soon as possible. Continue with plans to see GI for further evaluation.

## 2023-07-28 NOTE — PROGRESS NOTES
CALL PATIENT with results:    Hemoglobin level is improving since last time. Return FOBT as soon as possible. Continue with plans to see GI for further evaluation.

## 2023-07-29 ENCOUNTER — HOSPITAL ENCOUNTER (OUTPATIENT)
Facility: HOSPITAL | Age: 73
Setting detail: OBSERVATION
LOS: 3 days | Discharge: HOME | End: 2023-08-04
Attending: EMERGENCY MEDICINE | Admitting: INTERNAL MEDICINE
Payer: MEDICARE

## 2023-07-29 ENCOUNTER — APPOINTMENT (OUTPATIENT)
Dept: GENERAL RADIOLOGY | Facility: HOSPITAL | Age: 73
End: 2023-07-29
Payer: MEDICARE

## 2023-07-29 ENCOUNTER — APPOINTMENT (OUTPATIENT)
Dept: CT IMAGING | Facility: HOSPITAL | Age: 73
End: 2023-07-29
Payer: MEDICARE

## 2023-07-29 DIAGNOSIS — R93.89 ABNORMAL CT SCAN: ICD-10-CM

## 2023-07-29 DIAGNOSIS — K62.89 PROCTITIS: ICD-10-CM

## 2023-07-29 DIAGNOSIS — Z90.49 HISTORY OF ESOPHAGECTOMY: ICD-10-CM

## 2023-07-29 DIAGNOSIS — K52.9 COLITIS: Primary | ICD-10-CM

## 2023-07-29 DIAGNOSIS — R11.2 NAUSEA AND VOMITING, UNSPECIFIED VOMITING TYPE: ICD-10-CM

## 2023-07-29 DIAGNOSIS — Z98.890 HISTORY OF ESOPHAGECTOMY: ICD-10-CM

## 2023-07-29 DIAGNOSIS — Z85.01 HISTORY OF ESOPHAGEAL CANCER: ICD-10-CM

## 2023-07-29 DIAGNOSIS — C61 MALIGNANT NEOPLASM OF PROSTATE: ICD-10-CM

## 2023-07-29 DIAGNOSIS — N32.89 BLADDER DISTENTION: ICD-10-CM

## 2023-07-29 PROBLEM — R33.8 ACUTE URINARY RETENTION: Status: ACTIVE | Noted: 2023-07-29

## 2023-07-29 LAB
ALBUMIN SERPL-MCNC: 4.3 G/DL (ref 3.5–5.2)
ALBUMIN/GLOB SERPL: 1.5 G/DL
ALP SERPL-CCNC: 109 U/L (ref 39–117)
ALT SERPL W P-5'-P-CCNC: 15 U/L (ref 1–41)
ANION GAP SERPL CALCULATED.3IONS-SCNC: 11 MMOL/L (ref 5–15)
AST SERPL-CCNC: 20 U/L (ref 1–40)
BACTERIA UR QL AUTO: ABNORMAL /HPF
BASOPHILS # BLD AUTO: 0.01 10*3/MM3 (ref 0–0.2)
BASOPHILS NFR BLD AUTO: 0.3 % (ref 0–1.5)
BILIRUB SERPL-MCNC: 0.4 MG/DL (ref 0–1.2)
BILIRUB UR QL STRIP: NEGATIVE
BUN SERPL-MCNC: 18 MG/DL (ref 8–23)
BUN/CREAT SERPL: 15.4 (ref 7–25)
CALCIUM SPEC-SCNC: 9.1 MG/DL (ref 8.6–10.5)
CHLORIDE SERPL-SCNC: 103 MMOL/L (ref 98–107)
CLARITY UR: CLEAR
CO2 SERPL-SCNC: 25 MMOL/L (ref 22–29)
COLOR UR: YELLOW
CREAT SERPL-MCNC: 1.17 MG/DL (ref 0.76–1.27)
DEPRECATED RDW RBC AUTO: 43.9 FL (ref 37–54)
EGFRCR SERPLBLD CKD-EPI 2021: 65 ML/MIN/1.73
EOSINOPHIL # BLD AUTO: 0 10*3/MM3 (ref 0–0.4)
EOSINOPHIL NFR BLD AUTO: 0 % (ref 0.3–6.2)
ERYTHROCYTE [DISTWIDTH] IN BLOOD BY AUTOMATED COUNT: 13.7 % (ref 12.3–15.4)
GEN 5 2HR TROPONIN T REFLEX: 61 NG/L
GLOBULIN UR ELPH-MCNC: 2.8 GM/DL
GLUCOSE SERPL-MCNC: 136 MG/DL (ref 65–99)
GLUCOSE UR STRIP-MCNC: NEGATIVE MG/DL
HCT VFR BLD AUTO: 34.5 % (ref 37.5–51)
HGB BLD-MCNC: 11.6 G/DL (ref 13–17.7)
HGB UR QL STRIP.AUTO: ABNORMAL
HYALINE CASTS UR QL AUTO: ABNORMAL /LPF
IMM GRANULOCYTES # BLD AUTO: 0 10*3/MM3 (ref 0–0.05)
IMM GRANULOCYTES NFR BLD AUTO: 0 % (ref 0–0.5)
KETONES UR QL STRIP: NEGATIVE
LEUKOCYTE ESTERASE UR QL STRIP.AUTO: NEGATIVE
LIPASE SERPL-CCNC: 25 U/L (ref 13–60)
LYMPHOCYTES # BLD AUTO: 0.76 10*3/MM3 (ref 0.7–3.1)
LYMPHOCYTES NFR BLD AUTO: 23.8 % (ref 19.6–45.3)
MCH RBC QN AUTO: 29.7 PG (ref 26.6–33)
MCHC RBC AUTO-ENTMCNC: 33.6 G/DL (ref 31.5–35.7)
MCV RBC AUTO: 88.2 FL (ref 79–97)
MONOCYTES # BLD AUTO: 0.18 10*3/MM3 (ref 0.1–0.9)
MONOCYTES NFR BLD AUTO: 5.6 % (ref 5–12)
NEUTROPHILS NFR BLD AUTO: 2.24 10*3/MM3 (ref 1.7–7)
NEUTROPHILS NFR BLD AUTO: 70.3 % (ref 42.7–76)
NITRITE UR QL STRIP: NEGATIVE
NRBC BLD AUTO-RTO: 0 /100 WBC (ref 0–0.2)
PH UR STRIP.AUTO: 5.5 [PH] (ref 5–8)
PLATELET # BLD AUTO: 194 10*3/MM3 (ref 140–450)
PMV BLD AUTO: 9.3 FL (ref 6–12)
POTASSIUM SERPL-SCNC: 4.3 MMOL/L (ref 3.5–5.2)
PROT SERPL-MCNC: 7.1 G/DL (ref 6–8.5)
PROT UR QL STRIP: NEGATIVE
QT INTERVAL: 472 MS
RBC # BLD AUTO: 3.91 10*6/MM3 (ref 4.14–5.8)
RBC # UR STRIP: ABNORMAL /HPF
REF LAB TEST METHOD: ABNORMAL
SODIUM SERPL-SCNC: 139 MMOL/L (ref 136–145)
SP GR UR STRIP: 1.01 (ref 1–1.03)
SQUAMOUS #/AREA URNS HPF: ABNORMAL /HPF
TROPONIN T DELTA: 2 NG/L
TROPONIN T SERPL HS-MCNC: 59 NG/L
UROBILINOGEN UR QL STRIP: ABNORMAL
WBC # UR STRIP: ABNORMAL /HPF
WBC NRBC COR # BLD: 3.19 10*3/MM3 (ref 3.4–10.8)

## 2023-07-29 PROCEDURE — A9270 NON-COVERED ITEM OR SERVICE: HCPCS | Performed by: INTERNAL MEDICINE

## 2023-07-29 PROCEDURE — 25010000002 ONDANSETRON PER 1 MG: Performed by: PHYSICIAN ASSISTANT

## 2023-07-29 PROCEDURE — 25510000001 IOPAMIDOL 61 % SOLUTION: Performed by: EMERGENCY MEDICINE

## 2023-07-29 PROCEDURE — 84484 ASSAY OF TROPONIN QUANT: CPT | Performed by: PHYSICIAN ASSISTANT

## 2023-07-29 PROCEDURE — 63710000001 SENNOSIDES-DOCUSATE 8.6-50 MG TABLET: Performed by: INTERNAL MEDICINE

## 2023-07-29 PROCEDURE — 74177 CT ABD & PELVIS W/CONTRAST: CPT

## 2023-07-29 PROCEDURE — 96365 THER/PROPH/DIAG IV INF INIT: CPT

## 2023-07-29 PROCEDURE — G0378 HOSPITAL OBSERVATION PER HR: HCPCS

## 2023-07-29 PROCEDURE — 25010000002 SODIUM CHLORIDE 0.9 % WITH KCL 20 MEQ 20-0.9 MEQ/L-% SOLUTION: Performed by: INTERNAL MEDICINE

## 2023-07-29 PROCEDURE — 71045 X-RAY EXAM CHEST 1 VIEW: CPT

## 2023-07-29 PROCEDURE — 96375 TX/PRO/DX INJ NEW DRUG ADDON: CPT

## 2023-07-29 PROCEDURE — 99285 EMERGENCY DEPT VISIT HI MDM: CPT

## 2023-07-29 PROCEDURE — 93005 ELECTROCARDIOGRAM TRACING: CPT | Performed by: PHYSICIAN ASSISTANT

## 2023-07-29 PROCEDURE — 81001 URINALYSIS AUTO W/SCOPE: CPT | Performed by: EMERGENCY MEDICINE

## 2023-07-29 PROCEDURE — 96361 HYDRATE IV INFUSION ADD-ON: CPT

## 2023-07-29 PROCEDURE — 63710000001 PANTOPRAZOLE 40 MG TABLET DELAYED-RELEASE: Performed by: INTERNAL MEDICINE

## 2023-07-29 PROCEDURE — 36415 COLL VENOUS BLD VENIPUNCTURE: CPT

## 2023-07-29 PROCEDURE — 63710000001 APIXABAN 5 MG TABLET: Performed by: INTERNAL MEDICINE

## 2023-07-29 PROCEDURE — 93010 ELECTROCARDIOGRAM REPORT: CPT | Performed by: STUDENT IN AN ORGANIZED HEALTH CARE EDUCATION/TRAINING PROGRAM

## 2023-07-29 PROCEDURE — 25010000002 CEFTRIAXONE PER 250 MG: Performed by: INTERNAL MEDICINE

## 2023-07-29 PROCEDURE — 83690 ASSAY OF LIPASE: CPT | Performed by: PHYSICIAN ASSISTANT

## 2023-07-29 PROCEDURE — 85025 COMPLETE CBC W/AUTO DIFF WBC: CPT | Performed by: PHYSICIAN ASSISTANT

## 2023-07-29 PROCEDURE — 80053 COMPREHEN METABOLIC PANEL: CPT | Performed by: PHYSICIAN ASSISTANT

## 2023-07-29 RX ORDER — TIOTROPIUM BROMIDE AND OLODATEROL 3.124; 2.736 UG/1; UG/1
SPRAY, METERED RESPIRATORY (INHALATION)
COMMUNITY

## 2023-07-29 RX ORDER — METOPROLOL TARTRATE 50 MG/1
50 TABLET, FILM COATED ORAL 2 TIMES DAILY
Status: DISCONTINUED | OUTPATIENT
Start: 2023-07-29 | End: 2023-08-04 | Stop reason: HOSPADM

## 2023-07-29 RX ORDER — AMOXICILLIN 250 MG
2 CAPSULE ORAL 2 TIMES DAILY
Status: DISCONTINUED | OUTPATIENT
Start: 2023-07-29 | End: 2023-07-30

## 2023-07-29 RX ORDER — BISACODYL 10 MG
10 SUPPOSITORY, RECTAL RECTAL DAILY PRN
Status: DISCONTINUED | OUTPATIENT
Start: 2023-07-29 | End: 2023-07-30

## 2023-07-29 RX ORDER — ACETAMINOPHEN 650 MG/1
650 SUPPOSITORY RECTAL EVERY 4 HOURS PRN
Status: DISCONTINUED | OUTPATIENT
Start: 2023-07-29 | End: 2023-08-04 | Stop reason: HOSPADM

## 2023-07-29 RX ORDER — ACETAMINOPHEN 160 MG/5ML
650 SOLUTION ORAL EVERY 4 HOURS PRN
Status: DISCONTINUED | OUTPATIENT
Start: 2023-07-29 | End: 2023-08-04 | Stop reason: HOSPADM

## 2023-07-29 RX ORDER — ACETAMINOPHEN 325 MG/1
650 TABLET ORAL EVERY 4 HOURS PRN
Status: DISCONTINUED | OUTPATIENT
Start: 2023-07-29 | End: 2023-08-04 | Stop reason: HOSPADM

## 2023-07-29 RX ORDER — POLYETHYLENE GLYCOL 3350 17 G/17G
17 POWDER, FOR SOLUTION ORAL DAILY PRN
Status: DISCONTINUED | OUTPATIENT
Start: 2023-07-29 | End: 2023-07-30

## 2023-07-29 RX ORDER — BISACODYL 5 MG/1
5 TABLET, DELAYED RELEASE ORAL DAILY PRN
Status: DISCONTINUED | OUTPATIENT
Start: 2023-07-29 | End: 2023-07-30

## 2023-07-29 RX ORDER — PAROXETINE HYDROCHLORIDE 20 MG/1
40 TABLET, FILM COATED ORAL EVERY MORNING
Status: DISCONTINUED | OUTPATIENT
Start: 2023-07-30 | End: 2023-08-04 | Stop reason: HOSPADM

## 2023-07-29 RX ORDER — SODIUM CHLORIDE 0.9 % (FLUSH) 0.9 %
10 SYRINGE (ML) INJECTION EVERY 12 HOURS SCHEDULED
Status: DISCONTINUED | OUTPATIENT
Start: 2023-07-29 | End: 2023-08-04 | Stop reason: HOSPADM

## 2023-07-29 RX ORDER — SODIUM CHLORIDE AND POTASSIUM CHLORIDE 150; 900 MG/100ML; MG/100ML
100 INJECTION, SOLUTION INTRAVENOUS CONTINUOUS
Status: DISCONTINUED | OUTPATIENT
Start: 2023-07-29 | End: 2023-08-04 | Stop reason: HOSPADM

## 2023-07-29 RX ORDER — SODIUM CHLORIDE 9 MG/ML
40 INJECTION, SOLUTION INTRAVENOUS AS NEEDED
Status: DISCONTINUED | OUTPATIENT
Start: 2023-07-29 | End: 2023-08-04 | Stop reason: HOSPADM

## 2023-07-29 RX ORDER — SODIUM CHLORIDE 0.9 % (FLUSH) 0.9 %
10 SYRINGE (ML) INJECTION AS NEEDED
Status: DISCONTINUED | OUTPATIENT
Start: 2023-07-29 | End: 2023-08-04 | Stop reason: HOSPADM

## 2023-07-29 RX ORDER — ONDANSETRON 2 MG/ML
4 INJECTION INTRAMUSCULAR; INTRAVENOUS ONCE
Status: COMPLETED | OUTPATIENT
Start: 2023-07-29 | End: 2023-07-29

## 2023-07-29 RX ORDER — ONDANSETRON 2 MG/ML
4 INJECTION INTRAMUSCULAR; INTRAVENOUS EVERY 6 HOURS PRN
Status: DISCONTINUED | OUTPATIENT
Start: 2023-07-29 | End: 2023-08-04 | Stop reason: HOSPADM

## 2023-07-29 RX ORDER — METRONIDAZOLE 500 MG/100ML
500 INJECTION, SOLUTION INTRAVENOUS EVERY 8 HOURS
Status: DISCONTINUED | OUTPATIENT
Start: 2023-07-29 | End: 2023-08-04 | Stop reason: HOSPADM

## 2023-07-29 RX ORDER — PANTOPRAZOLE SODIUM 40 MG/1
40 TABLET, DELAYED RELEASE ORAL
Status: DISCONTINUED | OUTPATIENT
Start: 2023-07-29 | End: 2023-08-04 | Stop reason: HOSPADM

## 2023-07-29 RX ORDER — ALBUTEROL SULFATE 2.5 MG/3ML
2.5 SOLUTION RESPIRATORY (INHALATION) EVERY 6 HOURS PRN
Status: DISCONTINUED | OUTPATIENT
Start: 2023-07-29 | End: 2023-08-04 | Stop reason: HOSPADM

## 2023-07-29 RX ADMIN — PANTOPRAZOLE SODIUM 40 MG: 40 TABLET, DELAYED RELEASE ORAL at 20:51

## 2023-07-29 RX ADMIN — ONDANSETRON 4 MG: 2 INJECTION INTRAMUSCULAR; INTRAVENOUS at 12:12

## 2023-07-29 RX ADMIN — SODIUM CHLORIDE 1000 ML: 9 INJECTION, SOLUTION INTRAVENOUS at 12:12

## 2023-07-29 RX ADMIN — SENNOSIDES AND DOCUSATE SODIUM 2 TABLET: 50; 8.6 TABLET ORAL at 20:51

## 2023-07-29 RX ADMIN — Medication 10 ML: at 20:51

## 2023-07-29 RX ADMIN — APIXABAN 5 MG: 5 TABLET, FILM COATED ORAL at 20:51

## 2023-07-29 RX ADMIN — IOPAMIDOL 100 ML: 612 INJECTION, SOLUTION INTRAVENOUS at 13:19

## 2023-07-29 RX ADMIN — POTASSIUM CHLORIDE AND SODIUM CHLORIDE 100 ML/HR: 900; 150 INJECTION, SOLUTION INTRAVENOUS at 20:51

## 2023-07-29 RX ADMIN — CEFTRIAXONE SODIUM 1000 MG: 1 INJECTION, POWDER, FOR SOLUTION INTRAMUSCULAR; INTRAVENOUS at 23:08

## 2023-07-29 NOTE — ED NOTES
Pt to ed from home via EMS    Pt c/o dry heaving since 2200 last night. EMS reports this is chronic issue. Pt has not been taking his meds x1 week. Pt denies all pain

## 2023-07-29 NOTE — PLAN OF CARE
Goal Outcome Evaluation: Patient is alert. Room air. Denies nausea. King putting out yellow urine. No complaints of pain.

## 2023-07-29 NOTE — ED PROVIDER NOTES
Pt presents to the ED c/o dry heaving, intermittent diarrhea, and abdominal pains.  Has also been having urinary incontinence.  Has a remote history of esophagectomy secondary to esophageal cancer that is not currently under any treatment.  Had stopped his medications a month ago and restarted just recently all of them.     On exam,   General: No acute distress, nondiaphoretic  HEENT: Mucous membranes tacky, atraumatic, EOMI  Neck: Full ROM  Pulm: Symmetric chest rise, nonlabored, lungs CTAB  Cardiovascular: Regular rate and rhythm, intact distal pulses  GI: Soft, mild suprapubic discomfort to palpation, nondistended, no rebound, no guarding, bowel sounds present  MSK: Full ROM, no deformity  Skin: Warm, dry  Neuro: Awake, alert, oriented x 4, GCS 15, moving all extremities, no focal deficits  Psych: Calm, cooperative      EKG - Per my independent interpretation:    EKG Time: 1145  Rhythm/Rate: Sinus rhythm with rate of 54  Normal axis  ID interval of 209, QRS of 120  No acute ischemic changes  No STEMI       Improved rate as compared to generally third of 2023        Plan:   ED Course as of 07/29/23 2057   Sat Jul 29, 2023   1226 WBC(!): 3.19 [DC]   1227 Hemoglobin(!): 11.6 [DC]   1227 Platelets: 194 [DC]   1227 Lipase: 25 [DC]   1227 HS Troponin T(!!): 59  No prior [DC]   1227 Glucose(!): 136 [DC]   1227 BUN: 18 [DC]   1227 Creatinine: 1.17 [DC]   1227 Sodium: 139 [DC]   1227 Potassium: 4.3 [DC]   1227 ALT (SGPT): 15 [DC]   1227 AST (SGOT): 20 [DC]   1227 Alkaline Phosphatase: 109 [DC]   1227 Total Bilirubin: 0.4 [DC]   1227 XR Chest 1 View  Per my independent interpretation of the chest x-ray, no evidence of pneumothorax, possible trace right pleural effusion [DC]   1248 XR Chest 1 View  Radiology report reviewed, no evidence of any acute emergent findings [DC]   1434 Spoke with Dr. Osborne with A.  Reviewed patient presentation and ED findings.  He agrees to admit to a [facility] observation bed. [MP]   1448 HS  Troponin T(!!): 61 [DC]   1448 Troponin T Delta: 2 [DC]   1538 Nitrite, UA: Negative [DC]   1538 Leukocytes, UA: Negative [DC]   1538 Blood, UA(!): Trace [DC]   1538 Ketones, UA: Negative [DC]   1538 Bacteria, UA(!): 1+ [DC]   1538 WBC, UA: 0-2 [DC]   1538 RBC, UA(!): 3-5 [DC]      ED Course User Index  [DC] Tam Garcia MD  [MP] Na Rojo PA-C     Plan for hospitalization for findings today of the nausea and vomiting with proctitis/colitis in addition to urinary retention and hydronephrosis.  King catheter placed, plan for hospitalization for further monitoring.     Attestation:  The ALCIDES and I have discussed this patient's history, physical exam, and treatment plan.  I have reviewed the documentation and personally had a face to face interaction with the patient. I affirm the documentation and agree with the treatment and plan.  The attached note describes my personal findings.            Tam Garcia MD  07/29/23 2100

## 2023-07-29 NOTE — ED NOTES
"..Nursing report ED to floor  Jose Hurtado  73 y.o.  male    HPI :   Chief Complaint   Patient presents with    Nausea       Admitting doctor:   Jose Osborne MD    Admitting diagnosis:   The primary encounter diagnosis was Colitis. Diagnoses of Proctitis, Bladder distention, Nausea and vomiting, unspecified vomiting type, and History of esophagectomy were also pertinent to this visit.    Code status:   Current Code Status       Date Active Code Status Order ID Comments User Context       Prior            Allergies:   Patient has no known allergies.    Isolation:   No active isolations    Intake and Output  No intake or output data in the 24 hours ending 07/29/23 1457    Weight:       07/29/23  0941   Weight: 81.6 kg (180 lb)       Most recent vitals:   Vitals:    07/29/23 0941 07/29/23 1131   BP: 128/63 134/71   BP Location:  Left arm   Patient Position:  Sitting   Pulse: 51 52   Resp: 18 16   Temp: 96.2 øF (35.7 øC)    TempSrc: Tympanic    SpO2: 100% 100%   Weight: 81.6 kg (180 lb)    Height: 195.6 cm (77\")        Active LDAs/IV Access:   Lines, Drains & Airways       Active LDAs       Name Placement date Placement time Site Days    Peripheral IV 07/29/23 1142 Right Antecubital 07/29/23  1142  Antecubital  less than 1    Urethral Catheter Coude 16 Fr. 07/29/23  1454  -- less than 1                    Labs (abnormal labs have a star):   Labs Reviewed   COMPREHENSIVE METABOLIC PANEL - Abnormal; Notable for the following components:       Result Value    Glucose 136 (*)     All other components within normal limits    Narrative:     GFR Normal >60  Chronic Kidney Disease <60  Kidney Failure <15    The GFR formula is only valid for adults with stable renal function between ages 18 and 70.   SINGLE HSTROPONIN T - Abnormal; Notable for the following components:    HS Troponin T 59 (*)     All other components within normal limits    Narrative:     High Sensitive Troponin T Reference Range:  <10.0 ng/L- Negative Female " for AMI  <15.0 ng/L- Negative Male for AMI  >=10 - Abnormal Female indicating possible myocardial injury.  >=15 - Abnormal Male indicating possible myocardial injury.   Clinicians would have to utilize clinical acumen, EKG, Troponin, and serial changes to determine if it is an Acute Myocardial Infarction or myocardial injury due to an underlying chronic condition.        CBC WITH AUTO DIFFERENTIAL - Abnormal; Notable for the following components:    WBC 3.19 (*)     RBC 3.91 (*)     Hemoglobin 11.6 (*)     Hematocrit 34.5 (*)     Eosinophil % 0.0 (*)     All other components within normal limits   HIGH SENSITIVITIY TROPONIN T 2HR - Abnormal; Notable for the following components:    HS Troponin T 61 (*)     All other components within normal limits    Narrative:     High Sensitive Troponin T Reference Range:  <10.0 ng/L- Negative Female for AMI  <15.0 ng/L- Negative Male for AMI  >=10 - Abnormal Female indicating possible myocardial injury.  >=15 - Abnormal Male indicating possible myocardial injury.   Clinicians would have to utilize clinical acumen, EKG, Troponin, and serial changes to determine if it is an Acute Myocardial Infarction or myocardial injury due to an underlying chronic condition.        LIPASE - Normal   URINALYSIS W/ MICROSCOPIC IF INDICATED (NO CULTURE)   CBC AND DIFFERENTIAL    Narrative:     The following orders were created for panel order CBC & Differential.  Procedure                               Abnormality         Status                     ---------                               -----------         ------                     CBC Auto Differential[[id number]]        Abnormal            Final result                 Please view results for these tests on the individual orders.       EKG:   ECG 12 Lead Other; Epigastric pain   Preliminary Result   HEART RATE= 54  bpm   RR Interval= 1111  ms   SC Interval= 209  ms   P Horizontal Axis=   deg   P Front Axis= 50  deg   QRSD Interval= 120  ms   QT  Interval= 472  ms   QRS Axis= 37  deg   T Wave Axis= 51  deg   - ABNORMAL ECG -   Sinus rhythm   Nonspecific intraventricular conduction delay   Electronically Signed By:    Date and Time of Study: 2023 11:45:21          Meds given in ED:   Medications   sodium chloride 0.9 % bolus 1,000 mL (1,000 mL Intravenous New Bag 23 1212)   ondansetron (ZOFRAN) injection 4 mg (4 mg Intravenous Given 23 1212)   iopamidol (ISOVUE-300) 61 % injection 100 mL (100 mL Intravenous Given 23 1319)       Imaging results:  CT Abdomen Pelvis With Contrast    Result Date: 2023  1. Rectal wall thickening with surrounding stranding/inflammation consistent with focal colitis/proctitis. 2. Moderate distention of the urinary bladder. There is also hydroureter and mild hydronephrosis with distention of the renal pelvises without evidence for obstructing stone. Small nonobstructing renal stones are present. 3. Previous esophagectomy with gastric pull-through. Chronic loculated right basal pleural effusion with surrounding pleural thickening, similar to prior exam 2023.  Radiation dose reduction techniques were utilized, including automated exposure control and exposure modulation based on body size.  This report was finalized on 2023 1:59 PM by Dr. Andrade Boo M.D.      XR Chest 1 View    Result Date: 2023  No focal pulmonary consolidation. Minimal right pleural effusion.  This report was finalized on 2023 12:35 PM by Dr. Sumanth Boyer M.D.       Ambulatory status:   - needs assistance    Social issues:   Social History     Socioeconomic History    Marital status:      Spouse name: Lena    Number of children: 0    Years of education: College   Tobacco Use    Smoking status: Former     Packs/day: 2.00     Years: 3.00     Pack years: 6.00     Types: Cigarettes     Quit date: 1974     Years since quittin.6    Smokeless tobacco: Former     Types: Chew    Tobacco comments:      Caffeine - coffee and soda    Vaping Use    Vaping Use: Never used   Substance and Sexual Activity    Alcohol use: Yes     Alcohol/week: 3.0 standard drinks     Types: 1 Glasses of wine, 1 Cans of beer, 1 Shots of liquor per week     Comment: 2    Sexual activity: Defer       NIH Stroke Scale:       Britney Rob RN  07/29/23 14:57 EDT

## 2023-07-29 NOTE — ED PROVIDER NOTES
EMERGENCY DEPARTMENT ENCOUNTER    Room Number:  25/25  PCP: Brandin Bolton MD  Discussed/ obtained information from independent historians: Wife at bedside      HPI:  Chief Complaint: Nausea and vomiting  A complete HPI/ROS/PMH/PSH/SH/FH are unobtainable due to: Hard of hearing  Context: Jose Hurtado is a 73 y.o. male who presents to the ED c/o nausea and vomiting that started last night.  Patient reports he is mostly dry heaving.  Reports associated epigastric pain.  Had a bowel movement this morning.  Patient reports he stopped taking all of his medications 1 month ago and just restarted them yesterday.  No known sick contacts.  No injury or trauma to the abdomen.  Patient has had prior appendectomy, incisional hernia repair, laparoscopic jejunostomy, prostatectomy, and esophagectomy.  Patient denies associated fever, headache, chest pain, hematemesis, diarrhea, dysuria, or any other systemic complaint.      External (non-ED) record review:   Reviewed note from office visit with PCP on 7/27/2023 where patient seen for annual wellness visit, hypertension, depression, iron deficiency anemia.  Reviewed assessment and plan.  Encourage patient to restart medications and follow-up in clinic in 2 months.  Reviewed prior laboratory studies.  Most recent lipid panel with total cholesterol 119.  Most recent CMP with creatinine 1.26.      PAST MEDICAL HISTORY  Active Ambulatory Problems     Diagnosis Date Noted    History of esophageal cancer 02/11/2016    Adenomatous polyp of colon 02/11/2016    Malignant neoplasm of prostate 02/11/2016    RLS (restless legs syndrome) 02/11/2016    History of DVT (deep vein thrombosis) 03/22/2016    Recurrent major depressive disorder, in partial remission 01/26/2017    Hypertension 04/12/2018    Anemia 04/18/2019    Insomnia due to other mental disorder 06/06/2019    Peripheral polyneuropathy 06/06/2019    B12 deficiency 06/07/2019    Lymphedema 01/14/2020    Iron deficiency 02/10/2020     Gastroparesis 02/15/2020    History of recurrent deep vein thrombosis (DVT) 03/24/2020    Tremor of both hands 07/27/2020    Gastrointestinal hemorrhage 12/15/2020    Acute blood loss anemia 12/16/2020    Pancytopenia 12/16/2020    Chronic venous hypertension due to DVT 12/16/2020    Bleeding hemorrhoid 12/17/2020    Malabsorption of iron 01/05/2021    Iron deficiency anemia 01/05/2021    History of esophageal cancer 05/11/2021    Abnormal CT scan 05/11/2021    Generalized weakness 05/11/2021    Chronic anticoagulation 05/11/2021    CKD (chronic kidney disease) stage 2, GFR 60-89 ml/min 05/12/2021    Dysphagia 11/10/2021    NSTEMI (non-ST elevated myocardial infarction) 05/12/2022    Bronchitis 05/13/2022    Dyspnea 12/16/2022    Elevated troponin 01/03/2023    Atrial fibrillation 01/03/2023    Chronic obstructive pulmonary disease 01/04/2023    Congestive heart failure 02/10/2023    Gait instability 05/12/2023    Dark stools 06/24/2023     Resolved Ambulatory Problems     Diagnosis Date Noted    Dysphagia 02/11/2016    Duodenal ulcer 02/11/2016    Decreased body weight 02/11/2016    Cough     Hyperkalemia 02/15/2016    DVT (deep venous thrombosis) 08/24/2017    Other hyperlipidemia 01/30/2018    Anti-phospholipid syndrome 05/10/2018    Postsurgical malabsorption 10/25/2019    Chronic obstructive pulmonary disease with acute exacerbation 12/16/2020    Pleuritic chest pain 05/11/2021    Urinary tract infection due to extended-spectrum beta lactamase (ESBL) producing Escherichia coli 05/11/2021    PVC's (premature ventricular contractions) 03/16/2022    RSV (acute bronchiolitis due to respiratory syncytial virus) 01/03/2023    Acute respiratory failure with hypoxia 01/04/2023     Past Medical History:   Diagnosis Date    Acute deep vein thrombosis (DVT) of popliteal vein of left lower extremity 03/24/2020    Bladder disorder     Bleeding hemorrhoids     Chronic kidney disease     Community acquired pneumonia     COPD  (chronic obstructive pulmonary disease)     Deep venous thrombosis 2006, 2008    Depression     Esophageal carcinoma 12/31/2014    Hemorrhoids     HH (hiatus hernia)     History of atrial fibrillation 2015    History of kidney stones     History of nephrolithiasis     History of pancreatitis     History of radiation therapy     History of transfusion     Long-term (current) use of anticoagulants, INR goal 2.0-3.0     Restless legs syndrome     Sleep apnea     Squamous carcinoma          PAST SURGICAL HISTORY  Past Surgical History:   Procedure Laterality Date    APPENDECTOMY  1950    BRONCHOSCOPY      (Diagnostic)    CARDIAC CATHETERIZATION N/A 5/14/2022    Procedure: Left Heart Cath;  Surgeon: Eric So MD;  Location: [facility] CATH INVASIVE LOCATION;  Service: Cardiology;  Laterality: N/A;    CARDIAC CATHETERIZATION N/A 5/14/2022    Procedure: Coronary angiography;  Surgeon: Eric So MD;  [facility];  Service: Cardiology;  Laterality: N/A;    CARDIAC CATHETERIZATION N/A 5/14/2022    Procedure: Left ventriculography;  Surgeon: Eric So MD;  Location: [facility] CATH INVASIVE LOCATION;  Service: Cardiology;  Laterality: N/A;    CATARACT EXTRACTION Bilateral 2014    COLONOSCOPY  12/15/2014    Complete / Description: EH, IH, torts, stool, follow-up colonoscopy due in 5 years.    COLONOSCOPY N/A 6/13/2017    non-thrombosed external hemorrhoids, normal examined ileum, IH    COLONOSCOPY N/A 2/26/2019    Procedure: COLONOSCOPY with Cold Polypectomy;  Surgeon: Mulugeta Millan MD;  Location: [facility] ENDOSCOPY;  Service: Gastroenterology    COLONOSCOPY N/A 12/16/2020    Procedure: COLONOSCOPY to cecum into TI;  Surgeon: Mesha Sanchez MD;  Location: [facility] ENDOSCOPY;  Service: Gastroenterology;  Laterality: N/A;  pre: lower GI bleed  post: hemorrhoids     CYSTOSCOPY W/ LASER LITHOTRIPSY      ENDOSCOPY N/A 6/13/2017    Procedure:  ESOPHAGOGASTRODUODENOSCOPY WITH COLD BIOPSY;  Surgeon: Lake Gonzalez MD;  Location: [facility] ENDOSCOPY;  Service:     ENDOSCOPY N/A 11/18/2021    Procedure: ESOPHAGOGASTRODUODENOSCOPY WITH  DILATATION WITH FLUOROSCOPY;  Surgeon: Jose Christine III, MD;  Location: [facility] ENDOSCOPY;  Service: Gastroenterology;  Laterality: N/A;  Pre: dysphagia, h/x of adinocarcinoma of esophagus  Post: same    ESOPHAGECTOMY      April 2015, stage IIB esophageal carcinoma, sub-total resection.    ESOPHAGECTOMY      Esophagectomy Subtotal Andrea Joe Procedure    EXCISION LESION  08/2012    Removal of Squamous Cell CA on Head    HAMMER TOE REPAIR  09/2014    Hammertoe Operation (Each Toe), 10/2014    HAMMER TOE REPAIR Left 10/3/2017    Procedure: Left second third and fourth distal interphalangeal joint resection with flexor tenotomy;  Surgeon: Mulugeta Lira MD;  Location: [facility] OR OSC;  Service:     HEMORRHOIDECTOMY N/A 12/23/2020    Procedure: HEMORRHOIDECTOMY;  Surgeon: Billy Julio Jr., MD;  Location: [facility] MAIN OR;  Service: General;  Laterality: N/A;    HERNIA REPAIR      incisional    JEJUNOSTOMY      Laparoscopic    JEJUNOSTOMY      tube removal     KNEE SURGERY Bilateral 1967, 1973, 1981    PATELLA SURGERY Left     removed    PILONIDAL CYST / SINUS EXCISION      ND ARTHRP KNE CONDYLE&PLATU MEDIAL&LAT COMPARTMENTS Right 3/26/2018    Procedure: TOTAL KNEE ARTHROPLASTY;  Surgeon: Renny Solis MD;  Location: [facility] OR;  Service: Orthopedics    PROSTATECTOMY  2010    PYLOROPLASTY      SPINAL FUSION  02/1998    C 5,6    TONSILLECTOMY      UPPER GASTROINTESTINAL ENDOSCOPY  12/15/2014    LA Grade D esophagitis, Pardo's, HH, multiple duodenal ulcers    VENTRAL/INCISIONAL HERNIA REPAIR N/A 4/14/2016    Procedure: VENTRAL/INCISIONAL HERNIA REPAIR, open, with mesh, and component separation;  Surgeon: Darren Rivas MD;  Location: [facility] MAIN OR;  Service:          FAMILY HISTORY  Family History    Problem Relation Age of Onset    Cerebral aneurysm Mother         cerebral artery aneurysm ( age 56)    Anxiety disorder Father     Suicide Attempts Father          of suicide    Cancer Father         bladder    Prostate cancer Brother 68    No Known Problems Brother     Pancreatic cancer Nephew     Colon cancer Neg Hx     Esophageal cancer Neg Hx     Dementia Neg Hx     Malig Hyperthermia Neg Hx          SOCIAL HISTORY  Social History     Socioeconomic History    Marital status:      Spouse name: Lena    Number of children: 0    Years of education: College   Tobacco Use    Smoking status: Former     Packs/day: 2.00     Years: 3.00     Pack years: 6.00     Types: Cigarettes     Quit date:      Years since quittin.6    Smokeless tobacco: Former     Types: Chew    Tobacco comments:     Caffeine - coffee and soda    Vaping Use    Vaping Use: Never used   Substance and Sexual Activity    Alcohol use: Yes     Alcohol/week: 3.0 standard drinks     Types: 1 Glasses of wine, 1 Cans of beer, 1 Shots of liquor per week     Comment: 2    Sexual activity: Defer         ALLERGIES  Patient has no known allergies.        REVIEW OF SYSTEMS  Review of Systems   Constitutional:  Negative for chills and fever.   HENT:  Negative for ear pain and sore throat.    Respiratory:  Negative for cough and shortness of breath.    Cardiovascular:  Negative for chest pain and palpitations.   Gastrointestinal:  Positive for abdominal pain, nausea and vomiting.   Genitourinary:  Negative for dysuria and hematuria.   Musculoskeletal:  Negative for arthralgias and joint swelling.   Skin:  Negative for pallor and rash.   Neurological:  Negative for syncope and headaches.   Psychiatric/Behavioral:  Negative for confusion and hallucinations.           PHYSICAL EXAM  ED Triage Vitals [23 0941]   Temp Heart Rate Resp BP SpO2   96.2 øF (35.7 øC) 51 18 128/63 100 %      Temp src Heart Rate Source Patient Position BP  Location FiO2 (%)   Tympanic Monitor -- -- --       Physical Exam  Constitutional:       General: He is not in acute distress.     Appearance: Normal appearance.      Comments: Patient very hard of hearing in right ear   HENT:      Head: Normocephalic and atraumatic.      Nose: Nose normal.      Mouth/Throat:      Mouth: Mucous membranes are moist.   Eyes:      Conjunctiva/sclera: Conjunctivae normal.      Pupils: Pupils are equal, round, and reactive to light.   Cardiovascular:      Rate and Rhythm: Normal rate and regular rhythm.      Pulses: Normal pulses.      Heart sounds: Normal heart sounds.   Pulmonary:      Effort: Pulmonary effort is normal.      Breath sounds: Normal breath sounds.   Abdominal:      General: There is no distension.      Tenderness: There is abdominal tenderness in the epigastric area. There is no guarding.   Musculoskeletal:         General: Normal range of motion.      Cervical back: Normal range of motion and neck supple.   Skin:     General: Skin is warm.      Capillary Refill: Capillary refill takes less than 2 seconds.   Neurological:      General: No focal deficit present.      Mental Status: He is alert and oriented to person, place, and time.   Psychiatric:         Mood and Affect: Mood normal.       Vital signs and nursing notes reviewed.          LAB RESULTS  Recent Results (from the past 24 hour(s))   Comprehensive Metabolic Panel    Collection Time: 07/29/23 11:37 AM    Specimen: Blood   Result Value Ref Range    Glucose 136 (H) 65 - 99 mg/dL    BUN 18 8 - 23 mg/dL    Creatinine 1.17 0.76 - 1.27 mg/dL    Sodium 139 136 - 145 mmol/L    Potassium 4.3 3.5 - 5.2 mmol/L    Chloride 103 98 - 107 mmol/L    CO2 25.0 22.0 - 29.0 mmol/L    Calcium 9.1 8.6 - 10.5 mg/dL    Total Protein 7.1 6.0 - 8.5 g/dL    Albumin 4.3 3.5 - 5.2 g/dL    ALT (SGPT) 15 1 - 41 U/L    AST (SGOT) 20 1 - 40 U/L    Alkaline Phosphatase 109 39 - 117 U/L    Total Bilirubin 0.4 0.0 - 1.2 mg/dL    Globulin 2.8  gm/dL    A/G Ratio 1.5 g/dL    BUN/Creatinine Ratio 15.4 7.0 - 25.0    Anion Gap 11.0 5.0 - 15.0 mmol/L    eGFR 65.0 >60.0 mL/min/1.73   Lipase    Collection Time: 07/29/23 11:37 AM    Specimen: Blood   Result Value Ref Range    Lipase 25 13 - 60 U/L   Single High Sensitivity Troponin T    Collection Time: 07/29/23 11:37 AM    Specimen: Blood   Result Value Ref Range    HS Troponin T 59 (C) <15 ng/L   CBC Auto Differential    Collection Time: 07/29/23 11:37 AM    Specimen: Blood   Result Value Ref Range    WBC 3.19 (L) 3.40 - 10.80 10*3/mm3    RBC 3.91 (L) 4.14 - 5.80 10*6/mm3    Hemoglobin 11.6 (L) 13.0 - 17.7 g/dL    Hematocrit 34.5 (L) 37.5 - 51.0 %    MCV 88.2 79.0 - 97.0 fL    MCH 29.7 26.6 - 33.0 pg    MCHC 33.6 31.5 - 35.7 g/dL    RDW 13.7 12.3 - 15.4 %    RDW-SD 43.9 37.0 - 54.0 fl    MPV 9.3 6.0 - 12.0 fL    Platelets 194 140 - 450 10*3/mm3    Neutrophil % 70.3 42.7 - 76.0 %    Lymphocyte % 23.8 19.6 - 45.3 %    Monocyte % 5.6 5.0 - 12.0 %    Eosinophil % 0.0 (L) 0.3 - 6.2 %    Basophil % 0.3 0.0 - 1.5 %    Immature Grans % 0.0 0.0 - 0.5 %    Neutrophils, Absolute 2.24 1.70 - 7.00 10*3/mm3    Lymphocytes, Absolute 0.76 0.70 - 3.10 10*3/mm3    Monocytes, Absolute 0.18 0.10 - 0.90 10*3/mm3    Eosinophils, Absolute 0.00 0.00 - 0.40 10*3/mm3    Basophils, Absolute 0.01 0.00 - 0.20 10*3/mm3    Immature Grans, Absolute 0.00 0.00 - 0.05 10*3/mm3    nRBC 0.0 0.0 - 0.2 /100 WBC   ECG 12 Lead Other; Epigastric pain    Collection Time: 07/29/23 11:45 AM   Result Value Ref Range    QT Interval 472 ms       Ordered the above labs and reviewed the results.        RADIOLOGY  CT Abdomen Pelvis With Contrast    Result Date: 7/29/2023  CT ABDOMEN AND PELVIS WITH IV CONTRAST  HISTORY: Epigastric pain with nausea and vomiting for approximately 4 days.  TECHNIQUE:  CT includes axial imaging from the lung bases to the trochanters with intravenous contrast and without use of oral contrast. Data reconstructed in coronal and  sagittal planes. Radiation dose reduction techniques were utilized, including automated exposure control and exposure modulation based on body size.  COMPARISON: CT abdomen 03/29/2023.  FINDINGS: There has been esophagectomy with gastric pull-through and there is compressive atelectasis in the right lower lobe. There is a small loculated right pleural effusion with surrounding pleural thickening and this appears similar to the prior exam 03/29/2023. Left lung base appears clear. There is elevation of the right hemidiaphragm.  The liver, spleen, and pancreas appear within normal limits. There is mild left adrenal gland thickening and this is without change. The right adrenal gland appears within normal limits. Mild bilateral renal parenchymal thinning is present, greater on the left. There is mild bilateral hydronephrosis with dilatation of the extrarenal pelvises and this is new when compared to the exam 03/29/2023. Mild hydroureter is present. There are 3 mm bilateral lower pole nonobstructing renal stones and there is a 2-3 mm right upper pole renal nonobstructing stone. No evidence for obstructing stone. Moderate urinary bladder distention is present.  There is no bowel dilatation or evidence for bowel obstruction. There is wall thickening involving the rectum with perirectal stranding consistent with colitis/proctitis.  There is mild generalized stranding within the abdominal mesentery and there is mild body wall edema. Degenerative disc disease is present in the lumbar spine, greatest at L2-3 and L5-S1 where there is vacuum phenomena.      1. Rectal wall thickening with surrounding stranding/inflammation consistent with focal colitis/proctitis. 2. Moderate distention of the urinary bladder. There is also hydroureter and mild hydronephrosis with distention of the renal pelvises without evidence for obstructing stone. Small nonobstructing renal stones are present. 3. Previous esophagectomy with gastric  pull-through. Chronic loculated right basal pleural effusion with surrounding pleural thickening, similar to prior exam 03/29/2023.  Radiation dose reduction techniques were utilized, including automated exposure control and exposure modulation based on body size.  This report was finalized on 7/29/2023 1:59 PM by Dr. Andrade Boo M.D.      XR Chest 1 View    Result Date: 7/29/2023  XR CHEST 1 VW-  HISTORY: Male who is 73 years-old,  nausea, vomiting, prior esophagectomy  TECHNIQUE: Frontal views of the chest  COMPARISON: 01/02/2023  FINDINGS: The heart size is normal. Pulmonary vasculature is unremarkable. Aorta is tortuous. Gastric pull-through procedure changes are apparent. No focal pulmonary consolidation. Minimal right pleural effusion is seen. No pneumothorax. Old right rib deformities are noted. No acute osseous process.      No focal pulmonary consolidation. Minimal right pleural effusion.  This report was finalized on 7/29/2023 12:35 PM by Dr. Sumanth Boyer M.D.       Ordered the above noted radiological studies. Reviewed by me in PACS.              MEDICATIONS GIVEN IN ER  Medications   sodium chloride 0.9 % bolus 1,000 mL (1,000 mL Intravenous New Bag 7/29/23 1212)   ondansetron (ZOFRAN) injection 4 mg (4 mg Intravenous Given 7/29/23 1212)   iopamidol (ISOVUE-300) 61 % injection 100 mL (100 mL Intravenous Given 7/29/23 1319)               MEDICAL DECISION MAKING, PROGRESS, and CONSULTS    All labs have been independently reviewed by me.  All radiology studies have been reviewed by me and I have also reviewed the radiology report.   EKG's independently viewed and interpreted by me.  Discussion below represents my analysis of pertinent findings related to patient's condition, differential diagnosis, treatment plan and final disposition.            Orders placed during this visit:  Orders Placed This Encounter   Procedures    CT Abdomen Pelvis With Contrast    XR Chest 1 View    Comprehensive  Metabolic Panel    Lipase    Single High Sensitivity Troponin T    CBC Auto Differential    High Sensitivity Troponin T 2Hr    Urinalysis With Microscopic If Indicated (No Culture) - Urine, Catheter    Insert Indwelling Urinary Catheter    Assess Need for Indwelling Urinary Catheter - Follow Removal Protocol    Urinary Catheter Care    [facility] (on-call MD unless specified) Details    ECG 12 Lead Other; Epigastric pain    Initiate Observation Status    CBC & Differential           Differential diagnosis:  Bowel obstruction, viral gastroenteritis, gastroparesis      Independent interpretation of labs, radiology studies, and discussions with consultants:  ED Course as of 07/29/23 1437   Sat Jul 29, 2023   1226 WBC(!): 3.19 [DC]   1227 Hemoglobin(!): 11.6 [DC]   1227 Platelets: 194 [DC]   1227 Lipase: 25 [DC]   1227 HS Troponin T(!!): 59  No prior [DC]   1227 Glucose(!): 136 [DC]   1227 BUN: 18 [DC]   1227 Creatinine: 1.17 [DC]   1227 Sodium: 139 [DC]   1227 Potassium: 4.3 [DC]   1227 ALT (SGPT): 15 [DC]   1227 AST (SGOT): 20 [DC]   1227 Alkaline Phosphatase: 109 [DC]   1227 Total Bilirubin: 0.4 [DC]   1227 XR Chest 1 View  Per my independent interpretation of the chest x-ray, no evidence of pneumothorax, possible trace right pleural effusion [DC]   1248 XR Chest 1 View  Radiology report reviewed, no evidence of any acute emergent findings [DC]   1434 Spoke with Dr. Osborne with A.  Reviewed patient presentation and ED findings.  He agrees to admit to a [facility] observation bed. [MP]      ED Course User Index  [DC] Tam Garcia MD  [MP] Na Rojo PA-C         Additional orders considered but not ordered:  Abdominal ultrasound      Additional sources:    - Chronic or social conditions impacting care: Prior esophagectomy          DIAGNOSIS  Final diagnoses:   Colitis   Proctitis   Bladder distention   Nausea and vomiting, unspecified vomiting type   History of esophagectomy         Latest Documented Vital  Signs:  As of 14:37 EDT  BP- 134/71 HR- 52 Temp- 96.2 øF (35.7 øC) (Tympanic) O2 sat- 100%              --    Please note that portions of this were completed with a voice recognition program.       Note Disclaimer: At [facility], we believe that sharing information builds trust and better relationships. You are receiving this note because you are receiving care at [facility] or recently visited. It is possible you will see health information before a provider has talked with you about it. This kind of information can be easy to misunderstand. To help you fully understand what it means for your health, we urge you to discuss this note with your provider.             Na Rojo PAEduardC  07/29/23 7401     no

## 2023-07-29 NOTE — H&P
Internal medicine history and physical  INTERNAL MEDICINE   [facility]       Patient Identification:  Name: Jose Hurtado  Age: 73 y.o.  Sex: male  :  1950  MRN: [id number]                   Primary Care Physician: Brandin Bolton MD                               Date of admission:2023    Chief Complaint: Intermittent lower abdominal pain for the last 3 days associated with dry heaving.    History of Present Illness:   Patient is very hard of hearing and literally 1 has to shout with mouth closed to his right ear to have conversation.  Patient is a 73-year-old male who has complicated past medical history as noted below has history of hypertension depression iron deficiency as well as history of antiphospholipid antibody syndrome and DVTs and supposedly needed to be on lifelong anticoagulation therapy, history of urinary incontinence, history of esophageal cancer for which he had had an esophagectomy in the past and COPD and prior history of nephrolithiasis and pancreatitis and prostate cancer as well as sleep apnea who quit taking his medications about a month ago because he felt fine and did not think he needed those medications.  Patient was also evaluated in January of this year for complaints of black tarry stool and was given instructions for stool for Hemoccult testing that he never returned.  Patient had a follow-up visit with his primary care provider on 2023 and was encouraged to restart his medications.  Today he says that he has abdominal pain involving the lower abdomen ongoing for the last 3 days but was noted to have not having any abdominal pain at that time.  Work-up in the emergency room included CT scan of the abdomen and pelvis which showed urinary retention with bilateral mild hydronephrosis and rectal wall thickening with surrounding stranding consistent with focal colitis and proctitis.  Patient was feeling somewhat better after receiving Zofran in the  emergency room.  Patient was noted to have a white blood cell count of 3.19 troponin of 59 with normal LFTs.  King catheter was inserted with some improvement in his symptoms.  He was still having lower abdominal discomfort.      Past Medical History:  Past Medical History:   Diagnosis Date    Acute deep vein thrombosis (DVT) of popliteal vein of left lower extremity 03/24/2020    Anemia     Anti-phospholipid syndrome     On lifelong anticoagulation therapy    Bladder disorder     LEAKAGE   ON MED  wers pads    Bleeding hemorrhoids     Chronic kidney disease     Community acquired pneumonia     HISTORY OF IN 2014    Congestive heart failure     COPD (chronic obstructive pulmonary disease)     Deep venous thrombosis 2006, 2008    Left lower extremity multiple    Depression     Esophageal carcinoma 12/31/2014    had chemo and radiation prior surgery    Hemorrhoids     HH (hiatus hernia)     History of atrial fibrillation 2015    ONE EPISODE WHILE HOSPITALIZED    History of kidney stones     History of nephrolithiasis     History of pancreatitis     PT STATES MANY YEARS AGO    History of radiation therapy     History of transfusion     Hypertension     Long-term (current) use of anticoagulants, INR goal 2.0-3.0     Lymphedema     Malignant neoplasm of prostate     Other hyperlipidemia 01/30/2018 January 30, 2018 lipid panel risk 12.8%    Restless legs syndrome     Sleep apnea     OCCASIONALLY WEARS CPAP    Squamous carcinoma     on the head     Past Surgical History:  Past Surgical History:   Procedure Laterality Date    APPENDECTOMY  1950    BRONCHOSCOPY      (Diagnostic)    CARDIAC CATHETERIZATION N/A 5/14/2022    Procedure: Left Heart Cath;  Surgeon: Eric So MD;  Location: [facility] CATH INVASIVE LOCATION;  Service: Cardiology;  Laterality: N/A;    CARDIAC CATHETERIZATION N/A 5/14/2022    Procedure: Coronary angiography;  Surgeon: Eric So MD;  Location:  [facility] CATH INVASIVE LOCATION;   Service: Cardiology;  Laterality: N/A;    CARDIAC CATHETERIZATION N/A 5/14/2022    Procedure: Left ventriculography;  Surgeon: Eric So MD;  Location: [facility] CATH INVASIVE LOCATION;  Service: Cardiology;  Laterality: N/A;    CATARACT EXTRACTION Bilateral 2014    COLONOSCOPY  12/15/2014    Complete / Description: EH, IH, torts, stool, follow-up colonoscopy due in 5 years.    COLONOSCOPY N/A 6/13/2017    non-thrombosed external hemorrhoids, normal examined ileum, IH    COLONOSCOPY N/A 2/26/2019    Procedure: COLONOSCOPY with Cold Polypectomy;  Surgeon: Mulugeta Millan MD;  Location: [facility] ENDOSCOPY;  Service: Gastroenterology    COLONOSCOPY N/A 12/16/2020    Procedure: COLONOSCOPY to cecum into TI;  Surgeon: Mesha Sanchez MD;  Location: [facility] ENDOSCOPY;  Service: Gastroenterology;  Laterality: N/A;  pre: lower GI bleed  post: hemorrhoids     CYSTOSCOPY W/ LASER LITHOTRIPSY      ENDOSCOPY N/A 6/13/2017    Procedure: ESOPHAGOGASTRODUODENOSCOPY WITH COLD BIOPSY;  Surgeon: Lake Gonzalez MD;  Location: [facility] ENDOSCOPY;  Service:     ENDOSCOPY N/A 11/18/2021    Procedure: ESOPHAGOGASTRODUODENOSCOPY WITH  DILATATION WITH FLUOROSCOPY;  Surgeon: Jose Christine III, MD;  Location: [facility] ENDOSCOPY;  Service: Gastroenterology;  Laterality: N/A;  Pre: dysphagia, h/x of adinocarcinoma of esophagus  Post: same    ESOPHAGECTOMY      April 2015, stage IIB esophageal carcinoma, sub-total resection.    ESOPHAGECTOMY      Esophagectomy Subtotal [place] Procedure    EXCISION LESION  08/2012    Removal of Squamous Cell CA on Head    HAMMER TOE REPAIR  09/2014    Hammertoe Operation (Each Toe), 10/2014    HAMMER TOE REPAIR Left 10/3/2017    Procedure: Left second third and fourth distal interphalangeal joint resection with flexor tenotomy;  Surgeon: Mulugeta Lira MD;  Location: [facility] OR [facility];  Service:     HEMORRHOIDECTOMY N/A 12/23/2020    Procedure: HEMORRHOIDECTOMY;  Surgeon: Sumanth  Billy Davison MD;  Location: [facility] MAIN OR;  Service: General;  Laterality: N/A;    HERNIA REPAIR      incisional    JEJUNOSTOMY      Laparoscopic    JEJUNOSTOMY      tube removal     KNEE SURGERY Bilateral 1967, 1973, 1981    PATELLA SURGERY Left     removed    PILONIDAL CYST / SINUS EXCISION      UT ARTHRP KNE CONDYLE&PLATU MEDIAL&LAT COMPARTMENTS Right 3/26/2018    Procedure: TOTAL KNEE ARTHROPLASTY;  Surgeon: Renny Solis MD;  Location: [facility] MAIN OR;  Service: Orthopedics    PROSTATECTOMY  2010    PYLOROPLASTY      SPINAL FUSION  02/1998    C 5,6    TONSILLECTOMY      UPPER GASTROINTESTINAL ENDOSCOPY  12/15/2014    LA Grade D esophagitis, Pardo's, HH, multiple duodenal ulcers    VENTRAL/INCISIONAL HERNIA REPAIR N/A 4/14/2016    Procedure: VENTRAL/INCISIONAL HERNIA REPAIR, open, with mesh, and component separation;  Surgeon: Darren Rivas MD;  Location: [facility] MAIN OR;  Service:       Home Meds:  Medications Prior to Admission   Medication Sig Dispense Refill Last Dose    albuterol sulfate  (90 Base) MCG/ACT inhaler Inhale 1 puff Every 4 (Four) Hours As Needed for Wheezing. (Patient not taking: Reported on 7/27/2023)       atorvastatin (LIPITOR) 40 MG tablet Take 1 tablet by mouth Every Night. (Patient not taking: Reported on 7/27/2023) 90 tablet 3     cyanocobalamin 1000 MCG/ML injection INJECT 1 ML INTRAMUSCULARLY ONCE EVERY 30 DAYS AS DIRECTED (Patient not taking: Reported on 7/27/2023) 1 mL 11     Eliquis 5 MG tablet tablet TAKE ONE TABLET BY MOUTH EVERY 12 HOURS (Patient not taking: Reported on 7/27/2023) 60 tablet 1     furosemide (LASIX) 40 MG tablet Take 1 tablet by mouth 2 (Two) Times a Day. (Patient not taking: Reported on 7/27/2023)       Gemtesa 75 MG tablet Daily. (Patient not taking: Reported on 7/27/2023)       metoprolol tartrate (LOPRESSOR) 50 MG tablet Take 1 tablet by mouth 2 (Two) Times a Day for 60 days. (Patient not taking: Reported on 7/27/2023) 60 tablet 1      pantoprazole (PROTONIX) 40 MG EC tablet TAKE ONE TABLET BY MOUTH TWICE A DAY BEFORE A MEAL (Patient not taking: Reported on 2023) 180 tablet 3     PARoxetine (PAXIL) 40 MG tablet TAKE ONE TABLET BY MOUTH EVERY MORNING (Patient not taking: Reported on 2023) 30 tablet 5     potassium chloride (MICRO-K) 10 MEQ CR capsule Take 1 capsule by mouth Daily. (Patient not taking: Reported on 2023) 90 capsule 5     pramipexole (MIRAPEX) 1.5 MG tablet TAKE ONE TABLET BY MOUTH TWICE A DAY (Patient not taking: Reported on 2023) 180 tablet 1     tiotropium bromide monohydrate (Spiriva Respimat) 2.5 MCG/ACT aerosol solution inhaler Inhale 2 puffs Daily. (Patient not taking: Reported on 2023) 4 g 2      Current Meds:   No current facility-administered medications for this encounter.  Allergies:  No Known Allergies  Social History:   Social History     Tobacco Use    Smoking status: Former     Packs/day: 2.00     Years: 3.00     Pack years: 6.00     Types: Cigarettes     Quit date:      Years since quittin.6    Smokeless tobacco: Former     Types: Chew    Tobacco comments:     Caffeine - coffee and soda    Substance Use Topics    Alcohol use: Yes     Alcohol/week: 3.0 standard drinks     Types: 1 Glasses of wine, 1 Cans of beer, 1 Shots of liquor per week     Comment: 2      Family History:  Family History   Problem Relation Age of Onset    Cerebral aneurysm Mother         cerebral artery aneurysm ( age 56)    Anxiety disorder Father     Suicide Attempts Father          of suicide    Cancer Father         bladder    Prostate cancer Brother 68    No Known Problems Brother     Pancreatic cancer Nephew     Colon cancer Neg Hx     Esophageal cancer Neg Hx     Dementia Neg Hx     Malig Hyperthermia Neg Hx           Review of Systems  See history of present illness and past medical history.    Constitutional:  Negative for chills and fever.   HENT:  Negative for ear pain and sore throat.   "  Respiratory:  Negative for cough and shortness of breath.    Cardiovascular:  Negative for chest pain and palpitations.   Gastrointestinal:  Positive for abdominal pain, nausea and vomiting.   Genitourinary:  Negative for dysuria and hematuria.   Musculoskeletal:  Negative for arthralgias and joint swelling.   Skin:  Negative for pallor and rash.   Neurological:  Negative for syncope and headaches.   Psychiatric/Behavioral:  Negative for confusion and hallucinations.      Vitals:   /78 (BP Location: Left arm, Patient Position: Lying)   Pulse 51   Temp 97.3 øF (36.3 øC) (Oral)   Resp 16   Ht 195.6 cm (77\")   Wt 81.6 kg (180 lb)   SpO2 98%   BMI 21.34 kg/mý   I/O:   Intake/Output Summary (Last 24 hours) at 7/29/2023 1822  Last data filed at 7/29/2023 1531  Gross per 24 hour   Intake --   Output 800 ml   Net -800 ml     Exam:  Patient is examined using the personal protective equipment as per guidelines from infection control for this particular patient as enacted.  Hand washing was performed before and after patient interaction.  General Appearance:  Chronically ill, emaciated, alert cooperative older than stated age hard of hearing male who does not appear to be in any acute distress.   Head:    Normocephalic, without obvious abnormality, atraumatic   Eyes:    PERRL, conjunctiva/corneas clear, EOM's intact, both eyes   Ears:    Normal external ear canals, both ears   Nose:   Nares normal, septum midline, mucosa normal, no drainage    or sinus tenderness   Throat:   Lips, tongue, gums normal; oral mucosa pink and moist   Neck: No adenopathy detected neck is supple   Back:     Symmetric, no curvature, ROM normal, no CVA tenderness   Lungs:     Clear to auscultation bilaterally, respirations unlabored   Chest Wall:    No tenderness or deformity    Heart:  S1-S2 regular   Abdomen:   Soft mild lower abdominal tenderness no guarding rigidity or rebound noted King catheter in place   Extremities:   " Extremities normal, atraumatic, no cyanosis or edema   Pulses:   Pulses palpable in all extremities; symmetric all extremities   Skin: No rash noted   Neurologic: Grossly nonfocal except for hearing deficit.       Data Review:      I reviewed the patient's new clinical results.  Results from last 7 days   Lab Units 07/29/23  1137 07/27/23  1138   WBC 10*3/mm3 3.19*  --    HEMOGLOBIN g/dL 11.6* 11.1*   PLATELETS 10*3/mm3 194  --      Results from last 7 days   Lab Units 07/29/23  1137   SODIUM mmol/L 139   POTASSIUM mmol/L 4.3   CHLORIDE mmol/L 103   CO2 mmol/L 25.0   BUN mg/dL 18   CREATININE mg/dL 1.17   CALCIUM mg/dL 9.1   GLUCOSE mg/dL 136*     CT Abdomen Pelvis With Contrast    Result Date: 7/29/2023  1. Rectal wall thickening with surrounding stranding/inflammation consistent with focal colitis/proctitis. 2. Moderate distention of the urinary bladder. There is also hydroureter and mild hydronephrosis with distention of the renal pelvises without evidence for obstructing stone. Small nonobstructing renal stones are present. 3. Previous esophagectomy with gastric pull-through. Chronic loculated right basal pleural effusion with surrounding pleural thickening, similar to prior exam 03/29/2023.  Radiation dose reduction techniques were utilized, including automated exposure control and exposure modulation based on body size.  This report was finalized on 7/29/2023 1:59 PM by Dr. Andrade Boo M.D.      XR Chest 1 View    Result Date: 7/29/2023  No focal pulmonary consolidation. Minimal right pleural effusion.  This report was finalized on 7/29/2023 12:35 PM by Dr. Sumanth Boyer M.D.     ECG 12 Lead Other; Epigastric pain   Final Result   HEART RATE= 54  bpm   RR Interval= 1111  ms   MA Interval= 209  ms   P Horizontal Axis=   deg   P Front Axis= 50  deg   QRSD Interval= 120  ms   QT Interval= 472  ms   QRS Axis= 37  deg   T Wave Axis= 51  deg   - ABNORMAL ECG -   Sinus rhythm   Nonspecific intraventricular  conduction delay   When compared with ECG of 03-Jan-2023 7:22:39,   significant artifact, please repeat   Electronically Signed By: Hayden Plummer ([facility]) 29-Jul-2023 15:57:36   Date and Time of Study: 2023-07-29 11:45:21        Brief Urine Lab Results  (Last result in the past 365 days)        Color   Clarity   Blood   Leuk Est   Nitrite   Protein   CREAT   Urine HCG        07/29/23 1454 Yellow   Clear   Trace   Negative   Negative   Negative                       Assessment:  Active Hospital Problems    Diagnosis  POA    **Proctitis [K62.89]  Yes    Nausea and vomiting [R11.2]  Yes    Acute urinary retention [R33.8]  Yes    Chronic obstructive pulmonary disease [J44.9]  Yes    Atrial fibrillation [I48.91]  Yes    CKD (chronic kidney disease) stage 2, GFR 60-89 ml/min [N18.2]  Yes    History of esophageal cancer [Z85.01]  Yes    Iron deficiency [E61.1]  Yes    Lymphedema [I89.0]  Yes    Peripheral polyneuropathy [G62.9]  Yes     + severe B12 deficiency (6/7/19)  Continue B12 injections monthly        Anemia [D64.9]  Yes    Hypertension [I10]  Yes    Malignant neoplasm of prostate [C61]  Yes     *Storm  S/p prostatectomy 2010  Continue follow-up with urology.            Medical decision making/care plan: See admitting orders  Lower abdominal pain-multifactorial including stercoral proctitis due to constipation versus other causes of proctitis causing reactive urinary retention adding to his lower abdominal discomfort.  Plan is to admit the patient continue with King catheter, empiric Rocephin and Flagyl for proctitis and consult urology and GI service.  Provided symptomatic relief for nausea and pain.  COPD-continue with as needed nebulizer treatment and provided with continuous pulse ox monitoring.  Chronic atrial fibrillation and hypercoagulable state and history of DVT and PE supposedly he should be on lifelong anticoagulation therapy but has quit taking his medication about a month ago and was recently instructed  by his primary care provider to start taking his medications.  Plan restart his beta-blocker and Eliquis and monitor.  Hypertension-continue antihypertensive regimen and avoid hypotensive episodes.  History of esophageal cancer status post esophagectomy and prior treatment long time ago appears to be stable-monitor for aspiration precautions.  History of prostate cancer status post prostatectomy in 2010  Medical noncompliance-May need access evaluation to look into his insight about his overall understanding of his comorbidities.  Malnutrition-multifactorial may need nutrition evaluation.    Jose Osborne MD   7/29/2023  18:22 EDT    Parts of this note may be an electronic transcription/translation of spoken language to printed text using the Dragon dictation system.

## 2023-07-30 LAB
ALBUMIN SERPL-MCNC: 3.1 G/DL (ref 3.5–5.2)
ALBUMIN/GLOB SERPL: 1.2 G/DL
ALP SERPL-CCNC: 78 U/L (ref 39–117)
ALT SERPL W P-5'-P-CCNC: 13 U/L (ref 1–41)
ANION GAP SERPL CALCULATED.3IONS-SCNC: 8.4 MMOL/L (ref 5–15)
AST SERPL-CCNC: 18 U/L (ref 1–40)
BASOPHILS # BLD AUTO: 0.03 10*3/MM3 (ref 0–0.2)
BASOPHILS NFR BLD AUTO: 1.2 % (ref 0–1.5)
BILIRUB SERPL-MCNC: 0.3 MG/DL (ref 0–1.2)
BUN SERPL-MCNC: 15 MG/DL (ref 8–23)
BUN/CREAT SERPL: 14.6 (ref 7–25)
CALCIUM SPEC-SCNC: 8.4 MG/DL (ref 8.6–10.5)
CHLORIDE SERPL-SCNC: 107 MMOL/L (ref 98–107)
CO2 SERPL-SCNC: 24.6 MMOL/L (ref 22–29)
CREAT SERPL-MCNC: 1.03 MG/DL (ref 0.76–1.27)
DEPRECATED RDW RBC AUTO: 43.4 FL (ref 37–54)
EGFRCR SERPLBLD CKD-EPI 2021: 75.8 ML/MIN/1.73
EOSINOPHIL # BLD AUTO: 0.07 10*3/MM3 (ref 0–0.4)
EOSINOPHIL NFR BLD AUTO: 2.7 % (ref 0.3–6.2)
ERYTHROCYTE [DISTWIDTH] IN BLOOD BY AUTOMATED COUNT: 13.4 % (ref 12.3–15.4)
GLOBULIN UR ELPH-MCNC: 2.5 GM/DL
GLUCOSE SERPL-MCNC: 84 MG/DL (ref 65–99)
HCT VFR BLD AUTO: 31.9 % (ref 37.5–51)
HEMOCCULT STL QL IA: POSITIVE
HGB BLD-MCNC: 10.6 G/DL (ref 13–17.7)
IMM GRANULOCYTES # BLD AUTO: 0.01 10*3/MM3 (ref 0–0.05)
IMM GRANULOCYTES NFR BLD AUTO: 0.4 % (ref 0–0.5)
LYMPHOCYTES # BLD AUTO: 0.82 10*3/MM3 (ref 0.7–3.1)
LYMPHOCYTES NFR BLD AUTO: 31.8 % (ref 19.6–45.3)
MCH RBC QN AUTO: 29.5 PG (ref 26.6–33)
MCHC RBC AUTO-ENTMCNC: 33.2 G/DL (ref 31.5–35.7)
MCV RBC AUTO: 88.9 FL (ref 79–97)
MONOCYTES # BLD AUTO: 0.24 10*3/MM3 (ref 0.1–0.9)
MONOCYTES NFR BLD AUTO: 9.3 % (ref 5–12)
NEUTROPHILS NFR BLD AUTO: 1.41 10*3/MM3 (ref 1.7–7)
NEUTROPHILS NFR BLD AUTO: 54.6 % (ref 42.7–76)
NRBC BLD AUTO-RTO: 0 /100 WBC (ref 0–0.2)
PLATELET # BLD AUTO: 157 10*3/MM3 (ref 140–450)
PMV BLD AUTO: 9.5 FL (ref 6–12)
POTASSIUM SERPL-SCNC: 4.2 MMOL/L (ref 3.5–5.2)
PROT SERPL-MCNC: 5.6 G/DL (ref 6–8.5)
RBC # BLD AUTO: 3.59 10*6/MM3 (ref 4.14–5.8)
SODIUM SERPL-SCNC: 140 MMOL/L (ref 136–145)
WBC NRBC COR # BLD: 2.58 10*3/MM3 (ref 3.4–10.8)

## 2023-07-30 PROCEDURE — 63710000001 POLYETHYLENE GLYCOL 17 G PACK: Performed by: INTERNAL MEDICINE

## 2023-07-30 PROCEDURE — 80053 COMPREHEN METABOLIC PANEL: CPT | Performed by: INTERNAL MEDICINE

## 2023-07-30 PROCEDURE — A9270 NON-COVERED ITEM OR SERVICE: HCPCS | Performed by: INTERNAL MEDICINE

## 2023-07-30 PROCEDURE — 97530 THERAPEUTIC ACTIVITIES: CPT

## 2023-07-30 PROCEDURE — 63710000001 BISACODYL 5 MG TABLET DELAYED-RELEASE: Performed by: INTERNAL MEDICINE

## 2023-07-30 PROCEDURE — 97162 PT EVAL MOD COMPLEX 30 MIN: CPT

## 2023-07-30 PROCEDURE — 25010000002 SODIUM CHLORIDE 0.9 % WITH KCL 20 MEQ 20-0.9 MEQ/L-% SOLUTION: Performed by: INTERNAL MEDICINE

## 2023-07-30 PROCEDURE — 85025 COMPLETE CBC W/AUTO DIFF WBC: CPT | Performed by: INTERNAL MEDICINE

## 2023-07-30 PROCEDURE — 63710000001 TAMSULOSIN 0.4 MG CAPSULE: Performed by: INTERNAL MEDICINE

## 2023-07-30 PROCEDURE — 63710000001 APIXABAN 5 MG TABLET: Performed by: INTERNAL MEDICINE

## 2023-07-30 PROCEDURE — 96367 TX/PROPH/DG ADDL SEQ IV INF: CPT

## 2023-07-30 PROCEDURE — 63710000001 SENNOSIDES-DOCUSATE 8.6-50 MG TABLET: Performed by: INTERNAL MEDICINE

## 2023-07-30 PROCEDURE — 63710000001 PANTOPRAZOLE 40 MG TABLET DELAYED-RELEASE: Performed by: INTERNAL MEDICINE

## 2023-07-30 PROCEDURE — 63710000001 METOPROLOL TARTRATE 50 MG TABLET: Performed by: INTERNAL MEDICINE

## 2023-07-30 PROCEDURE — 25010000002 CEFTRIAXONE PER 250 MG: Performed by: INTERNAL MEDICINE

## 2023-07-30 PROCEDURE — 99222 1ST HOSP IP/OBS MODERATE 55: CPT | Performed by: INTERNAL MEDICINE

## 2023-07-30 PROCEDURE — 96361 HYDRATE IV INFUSION ADD-ON: CPT

## 2023-07-30 PROCEDURE — 63710000001 PAROXETINE 20 MG TABLET: Performed by: INTERNAL MEDICINE

## 2023-07-30 PROCEDURE — 25010000002 METRONIDAZOLE 500 MG/100ML SOLUTION: Performed by: INTERNAL MEDICINE

## 2023-07-30 RX ORDER — BISACODYL 10 MG
10 SUPPOSITORY, RECTAL RECTAL DAILY PRN
Status: DISCONTINUED | OUTPATIENT
Start: 2023-07-30 | End: 2023-07-30

## 2023-07-30 RX ORDER — TAMSULOSIN HYDROCHLORIDE 0.4 MG/1
0.4 CAPSULE ORAL DAILY
Status: DISCONTINUED | OUTPATIENT
Start: 2023-07-30 | End: 2023-08-04 | Stop reason: HOSPADM

## 2023-07-30 RX ORDER — BISACODYL 5 MG/1
5 TABLET, DELAYED RELEASE ORAL DAILY PRN
Status: DISCONTINUED | OUTPATIENT
Start: 2023-07-30 | End: 2023-07-30

## 2023-07-30 RX ORDER — AMOXICILLIN 250 MG
2 CAPSULE ORAL 2 TIMES DAILY
Status: DISCONTINUED | OUTPATIENT
Start: 2023-07-30 | End: 2023-08-04 | Stop reason: HOSPADM

## 2023-07-30 RX ORDER — AMOXICILLIN 250 MG
2 CAPSULE ORAL 2 TIMES DAILY
Status: DISCONTINUED | OUTPATIENT
Start: 2023-07-30 | End: 2023-07-30

## 2023-07-30 RX ORDER — POLYETHYLENE GLYCOL 3350 17 G/17G
17 POWDER, FOR SOLUTION ORAL DAILY
Status: DISCONTINUED | OUTPATIENT
Start: 2023-07-30 | End: 2023-08-04 | Stop reason: HOSPADM

## 2023-07-30 RX ORDER — POLYETHYLENE GLYCOL 3350 17 G/17G
17 POWDER, FOR SOLUTION ORAL DAILY
Status: DISCONTINUED | OUTPATIENT
Start: 2023-07-30 | End: 2023-07-30

## 2023-07-30 RX ORDER — BISACODYL 5 MG/1
5 TABLET, DELAYED RELEASE ORAL DAILY
Status: DISCONTINUED | OUTPATIENT
Start: 2023-07-30 | End: 2023-08-04 | Stop reason: HOSPADM

## 2023-07-30 RX ORDER — BISACODYL 10 MG
10 SUPPOSITORY, RECTAL RECTAL DAILY PRN
Status: DISCONTINUED | OUTPATIENT
Start: 2023-07-30 | End: 2023-08-04 | Stop reason: HOSPADM

## 2023-07-30 RX ADMIN — METOPROLOL TARTRATE 50 MG: 50 TABLET, FILM COATED ORAL at 20:11

## 2023-07-30 RX ADMIN — PAROXETINE HYDROCHLORIDE HEMIHYDRATE 40 MG: 20 TABLET, FILM COATED ORAL at 09:04

## 2023-07-30 RX ADMIN — TAMSULOSIN HYDROCHLORIDE 0.4 MG: 0.4 CAPSULE ORAL at 13:32

## 2023-07-30 RX ADMIN — METRONIDAZOLE 500 MG: 500 INJECTION, SOLUTION INTRAVENOUS at 15:30

## 2023-07-30 RX ADMIN — APIXABAN 5 MG: 5 TABLET, FILM COATED ORAL at 09:03

## 2023-07-30 RX ADMIN — METRONIDAZOLE 500 MG: 500 INJECTION, SOLUTION INTRAVENOUS at 09:03

## 2023-07-30 RX ADMIN — SENNOSIDES AND DOCUSATE SODIUM 2 TABLET: 50; 8.6 TABLET ORAL at 09:03

## 2023-07-30 RX ADMIN — PANTOPRAZOLE SODIUM 40 MG: 40 TABLET, DELAYED RELEASE ORAL at 09:03

## 2023-07-30 RX ADMIN — METRONIDAZOLE 500 MG: 500 INJECTION, SOLUTION INTRAVENOUS at 00:46

## 2023-07-30 RX ADMIN — Medication 10 ML: at 09:04

## 2023-07-30 RX ADMIN — BISACODYL 5 MG: 5 TABLET ORAL at 13:32

## 2023-07-30 RX ADMIN — METOPROLOL TARTRATE 50 MG: 50 TABLET, FILM COATED ORAL at 09:03

## 2023-07-30 RX ADMIN — SENNOSIDES AND DOCUSATE SODIUM 2 TABLET: 50; 8.6 TABLET ORAL at 20:11

## 2023-07-30 RX ADMIN — CEFTRIAXONE SODIUM 1000 MG: 1 INJECTION, POWDER, FOR SOLUTION INTRAMUSCULAR; INTRAVENOUS at 23:57

## 2023-07-30 RX ADMIN — POLYETHYLENE GLYCOL 3350 17 G: 17 POWDER, FOR SOLUTION ORAL at 13:33

## 2023-07-30 RX ADMIN — APIXABAN 5 MG: 5 TABLET, FILM COATED ORAL at 20:11

## 2023-07-30 RX ADMIN — PANTOPRAZOLE SODIUM 40 MG: 40 TABLET, DELAYED RELEASE ORAL at 17:58

## 2023-07-30 RX ADMIN — POTASSIUM CHLORIDE AND SODIUM CHLORIDE 100 ML/HR: 900; 150 INJECTION, SOLUTION INTRAVENOUS at 15:30

## 2023-07-30 NOTE — PLAN OF CARE
Goal Outcome Evaluation:  Plan of Care Reviewed With: patient           Outcome Evaluation: Pt is a 73 y.o. male admitted to [facility] with c/o nausea and vomiting and dry heaving on 7/29/2023. Work up reveals proctitis/colitis in addition to urinary retention and hydronephrosis.. Pt presents today with impaired strength, endurance, and decreased functional mobility. Pt required SBA for bed mobility, CGAc for STS transfers, and CGA for ambulation with rwx for 50 ft. Required 2 attempts at STS with posterior LOB on first attempt. Pt will benefit from skilled PT to address the previous deficits and improve overall safety with functional mobility. Prior to admission pt lives with wife in home and uses rwx or cane for mobility. Anticipate pt will D/C to home, may benefit from  PT pending progress with mobility as wife has limited ability to physically assist patient.      Anticipated Discharge Disposition (PT): home with home health, home with assist

## 2023-07-30 NOTE — CONSULTS
First Urology Consult    Admitted 7/29/23 for abd pain  Found to have urine retention with bilateral mild hydronephrosis  Has h/o this in past, currently using Interstim to control incontinence and assist in emptying  Last seen 9/22, low PVR  Known history of radiation for prostate cancer G7 2014  Known history of retention with hydronephrosis    King placed, amount of residual urine not documented    Asked to see    In hospital:  -WBC 2.58  -Plt - 157  -Hb - 10.6  -Creat -1.17    AVSS  F/C is in with clear yellow urine and unremarkable urinalysis  Urethral pain is controlled     Diet per primary team  Ambulate as tolerated  Ivf per primary team  Add flomax first dose today, then every am  VT when stronger    FAYE Hernández  [facility]  [phone]    ATTENDING NOTE:  Agree with above - keep King for a few days, start flomax.  Can have void trial in office or in a couple of days if still here.  Will f/u on Tuesday if still here.  O/W will arrange OV in 3-5 days for void trial.  Call if issues sooner.

## 2023-07-30 NOTE — CONSULTS
Nutrition Services    Patient Name:  Jose Hurtado  YOB: 1950  MRN: [id number]  Admit Date:  7/29/2023    Assessment Date:  07/30/23    Comment: Consulted per RN Admit Screen for wt loss/CAROLINA/ MSA:  Consulted per RN Admit Screen for wt loss and CAROLINA. Pt admitted with N/V and abdominal pain r/t stercoral proctitis. Pt with constipation. N/V improved now after zofran. GI recommended bowel regimen. Pt with 39 lb (18%) wt loss x 6 months.  Pt currently on clear liquid diet and tolerating well. Will follow for diet advancement and tolerance.     Severe Malnutrition (Based on ASPEN/AND criteria, pt meets nutrition diagnosis of Severe Malnutrition of Chronic Disease due to inadeqaute po intake and 39 lb (18%) wt loss x 6 months.)    Recommend:  Advance diet as tolerates to Regular diet.  Boost Breeze BID while on clear liquids.    Will follow per protocol.    CLINICAL NUTRITION ASSESSMENT      Reason for Assessment MST score 2+, Nurse Admission Screen     Diagnosis/Problem   CC: Intermittent lower abdominal pain, dry heaving  Dx: N/V, Colitis, proctitis, constipation, bladder distention, h/o esophagectomy   Medical/Surgical History Past Medical History:   Diagnosis Date    Acute deep vein thrombosis (DVT) of popliteal vein of left lower extremity 03/24/2020    Anemia     Anti-phospholipid syndrome     On lifelong anticoagulation therapy    Bladder disorder     LEAKAGE   ON MED  wers pads    Bleeding hemorrhoids     Chronic kidney disease     Community acquired pneumonia     HISTORY OF IN 2014    Congestive heart failure     COPD (chronic obstructive pulmonary disease)     Deep venous thrombosis 2006, 2008    Left lower extremity multiple    Depression     Esophageal carcinoma 12/31/2014    had chemo and radiation prior surgery    Hemorrhoids     HH (hiatus hernia)     History of atrial fibrillation 2015    ONE EPISODE WHILE HOSPITALIZED    History of kidney stones     History of nephrolithiasis     History of  pancreatitis     PT STATES MANY YEARS AGO    History of radiation therapy     History of transfusion     Hypertension     Long-term (current) use of anticoagulants, INR goal 2.0-3.0     Lymphedema     Malignant neoplasm of prostate     Other hyperlipidemia 01/30/2018 January 30, 2018 lipid panel risk 12.8%    Restless legs syndrome     Sleep apnea     OCCASIONALLY WEARS CPAP    Squamous carcinoma     on the head       Past Surgical History:   Procedure Laterality Date    APPENDECTOMY  1950    BRONCHOSCOPY      (Diagnostic)    CARDIAC CATHETERIZATION N/A 5/14/2022    Procedure: Left Heart Cath;  Surgeon: Eric So MD;  Location: [facility] CATH INVASIVE LOCATION;  Service: Cardiology;  Laterality: N/A;    CARDIAC CATHETERIZATION N/A 5/14/2022    Procedure: Coronary angiography;  Surgeon: Eric So MD;  Location: [facility] CATH INVASIVE LOCATION;  Service: Cardiology;  Laterality: N/A;    CARDIAC CATHETERIZATION N/A 5/14/2022    Procedure: Left ventriculography;  Surgeon: Eric So MD;  Location: [facility] CATH INVASIVE LOCATION;  Service: Cardiology;  Laterality: N/A;    CATARACT EXTRACTION Bilateral 2014    COLONOSCOPY  12/15/2014    Complete / Description: EH, IH, torts, stool, follow-up colonoscopy due in 5 years.    COLONOSCOPY N/A 6/13/2017    non-thrombosed external hemorrhoids, normal examined ileum, IH    COLONOSCOPY N/A 2/26/2019    Procedure: COLONOSCOPY with Cold Polypectomy;  Surgeon: Mulugeta Millan MD;  Location: [facility] ENDOSCOPY;  Service: Gastroenterology    COLONOSCOPY N/A 12/16/2020    Procedure: COLONOSCOPY to cecum into TI;  Surgeon: Mesha Sanchez MD;  Location: [facility] ENDOSCOPY;  Service: Gastroenterology;  Laterality: N/A;  pre: lower GI bleed  post: hemorrhoids     CYSTOSCOPY W/ LASER LITHOTRIPSY      ENDOSCOPY N/A 6/13/2017    Procedure: ESOPHAGOGASTRODUODENOSCOPY WITH COLD BIOPSY;  Surgeon: Lake Gonzalez MD;  Location: [facility] ENDOSCOPY;  Service:      ENDOSCOPY N/A 11/18/2021    Procedure: ESOPHAGOGASTRODUODENOSCOPY WITH  DILATATION WITH FLUOROSCOPY;  Surgeon: Jose Christine III, MD;  Location: [facility] ENDOSCOPY;  Service: Gastroenterology;  Laterality: N/A;  Pre: dysphagia, h/x of adinocarcinoma of esophagus  Post: same    ESOPHAGECTOMY      April 2015, stage IIB esophageal carcinoma, sub-total resection.    ESOPHAGECTOMY      Esophagectomy Subtotal Andrea Joe Procedure    EXCISION LESION  08/2012    Removal of Squamous Cell CA on Head    HAMMER TOE REPAIR  09/2014    Hammertoe Operation (Each Toe), 10/2014    HAMMER TOE REPAIR Left 10/3/2017    Procedure: Left second third and fourth distal interphalangeal joint resection with flexor tenotomy;  Surgeon: Mulugeta Lira MD;  Location: [facility] OR OSC;  Service:     HEMORRHOIDECTOMY N/A 12/23/2020    Procedure: HEMORRHOIDECTOMY;  Surgeon: Billy Julio Jr., MD;  Location: [facility] MAIN OR;  Service: General;  Laterality: N/A;    HERNIA REPAIR      incisional    JEJUNOSTOMY      Laparoscopic    JEJUNOSTOMY      tube removal     KNEE SURGERY Bilateral 1967, 1973, 1981    PATELLA SURGERY Left     removed    PILONIDAL CYST / SINUS EXCISION      MN ARTHRP KNE CONDYLE&PLATU MEDIAL&LAT COMPARTMENTS Right 3/26/2018    Procedure: TOTAL KNEE ARTHROPLASTY;  Surgeon: Renny Solis MD;  Location: [facility] OR;  Service: Orthopedics    PROSTATECTOMY  2010    PYLOROPLASTY      SPINAL FUSION  02/1998    C 5,6    TONSILLECTOMY      UPPER GASTROINTESTINAL ENDOSCOPY  12/15/2014    LA Grade D esophagitis, Pardo's, HH, multiple duodenal ulcers    VENTRAL/INCISIONAL HERNIA REPAIR N/A 4/14/2016    Procedure: VENTRAL/INCISIONAL HERNIA REPAIR, open, with mesh, and component separation;  Surgeon: Darren Rivas MD;  Location: [facility] MAIN OR;  Service:         Encounter Information        Nutrition History:  Consulted per RN Admit Screen for wt loss and CAROLINA. Pt admitted with N/V and abdominal pain r/t stercoral  "proctitis. Pt with constipation. N/V improved now after zofran. GI recommended bowel regimen. Pt with 21 lb (11%) wt loss x 6 months.  Pt currently on clear liquid diet and tolerating well. Will follow for diet advancement and tolerance.    Food Preferences:    Supplements:    Factors Affecting Intake: abdominal pain, altered GI function, decreased appetite, nausea, vomiting     Anthropometrics        Current Height  Current Weight  BMI kg/m2 Height: 195.6 cm (77\")  Weight: 78.4 kg (172 lb 13.5 oz) (07/30/23 1145)  Body mass index is 20.5 kg/mý.   Adjusted BMI (if applicable)    BMI Category Normal/Healthy (18.4 - 24.9)       Admission Weight 172 lb       Ideal Body Weight (IBW) 196 lb   Adjusted IBW (if applicable)        Usual Body Weight (UBW) 210-220 lb   Weight Change/Trend Loss, Amount/Timeframe: 39 lb (18%) wt loss x 6 months       Weight History Wt Readings from Last 30 Encounters:   07/30/23 1145 78.4 kg (172 lb 13.5 oz)   07/29/23 0941 81.6 kg (180 lb)   07/27/23 1015 81.6 kg (180 lb)   06/23/23 1405 85.5 kg (188 lb 6.4 oz)   05/16/23 1253 84.1 kg (185 lb 6.4 oz)   05/12/23 0734 90.7 kg (200 lb)   04/04/23 1323 93 kg (205 lb)   03/22/23 1254 93.4 kg (205 lb 12.8 oz)   02/17/23 1117 96.1 kg (211 lb 12.8 oz)   02/15/23 1044 93.5 kg (206 lb 1.6 oz)   02/10/23 1351 91.2 kg (201 lb)   01/20/23 1058 99.8 kg (220 lb)   01/03/23 0629 95.4 kg (210 lb 6.4 oz)   01/02/23 2331 97.5 kg (215 lb)   12/28/22 1123 104 kg (230 lb 3.2 oz)   12/21/22 1134 102 kg (224 lb)   12/16/22 1041 103 kg (227 lb)   12/05/22 1123 103 kg (227 lb)   11/22/22 1430 99 kg (218 lb 3.2 oz)   10/10/22 1001 107 kg (235 lb)   09/21/22 1126 105 kg (232 lb)   08/30/22 1500 97.1 kg (214 lb)   07/15/22 1035 96.2 kg (212 lb)   05/31/22 1615 94.2 kg (207 lb 11.2 oz)   05/16/22 0952 92.5 kg (204 lb)   05/14/22 0435 93.2 kg (205 lb 6.4 oz)   05/13/22 1147 95.3 kg (210 lb)   05/13/22 0605 96 kg (211 lb 9.6 oz)   05/12/22 2055 96.8 kg (213 lb 4.8 oz) "   05/12/22 2002 93.3 kg (205 lb 9.6 oz)   03/30/22 1341 95.3 kg (210 lb)   03/23/22 1243 101 kg (223 lb 3.2 oz)   03/22/22 0933 100 kg (220 lb 14.4 oz)   03/16/22 0857 99.2 kg (218 lb 12.8 oz)   01/13/22 1428 101 kg (223 lb)   12/28/21 1109 99.2 kg (218 lb 11.2 oz)           --  Estimated/Assessed Needs        Current Weight  Weight: 78.4 kg (172 lb 13.5 oz) (07/30/23 1145)       Energy Requirements    Weight for Calculation 78 kg   Method for Estimation  25 kcal/kg, 30 kcal/kg   EST Needs (kcal/day) 2521-5652 kcals       Protein Requirements    Weight for Calculation 78 kg   EST Protein Needs (g/kg) 1.2 - 1.5 gm/kg   EST Daily Needs (g/day)  g       Fluid Requirements     Method for Estimation 30 mL/kg    EST Needs (mL/day) 2340 ml     Tests/Procedures        Tests/Procedures CT scan, X-Ray     Labs       Pertinent Labs    Results from last 7 days   Lab Units 07/30/23  0827 07/29/23  1137   SODIUM mmol/L 140 139   POTASSIUM mmol/L 4.2 4.3   CHLORIDE mmol/L 107 103   CO2 mmol/L 24.6 25.0   BUN mg/dL 15 18   CREATININE mg/dL 1.03 1.17   CALCIUM mg/dL 8.4* 9.1   BILIRUBIN mg/dL 0.3 0.4   ALK PHOS U/L 78 109   ALT (SGPT) U/L 13 15   AST (SGOT) U/L 18 20   GLUCOSE mg/dL 84 136*     Results from last 7 days   Lab Units 07/30/23  0827   HEMOGLOBIN g/dL 10.6*   HEMATOCRIT % 31.9*   WBC 10*3/mm3 2.58*   ALBUMIN g/dL 3.1*     Results from last 7 days   Lab Units 07/30/23  0827 07/29/23  1137   PLATELETS 10*3/mm3 157 194     COVID19   Date Value Ref Range Status   01/03/2023 Not Detected Not Detected - Ref. Range Final     Lab Results   Component Value Date    HGBA1C 5.50 08/06/2021          Medications           Scheduled Medications apixaban, 5 mg, Oral, Q12H  senna-docusate sodium, 2 tablet, Oral, BID   And  polyethylene glycol, 17 g, Oral, Daily   And  bisacodyl, 5 mg, Oral, Daily  cefTRIAXone, 1,000 mg, Intravenous, Q24H  metoprolol tartrate, 50 mg, Oral, BID  metroNIDAZOLE, 500 mg, Intravenous, Q8H  pantoprazole,  40 mg, Oral, BID AC  PARoxetine, 40 mg, Oral, QAM  sodium chloride, 10 mL, Intravenous, Q12H  tamsulosin, 0.4 mg, Oral, Daily  tiotropium bromide monohydrate, 2 puff, Inhalation, Daily       Infusions sodium chloride 0.9 % with KCl 20 mEq, 100 mL/hr, Last Rate: 100 mL/hr (07/30/23 0659)       PRN Medications   acetaminophen **OR** acetaminophen **OR** acetaminophen    albuterol    senna-docusate sodium **AND** polyethylene glycol **AND** bisacodyl **AND** bisacodyl    Calcium Replacement - Follow Nurse / BPA Driven Protocol    Magnesium Standard Dose Replacement - Follow Nurse / BPA Driven Protocol    ondansetron    Phosphorus Replacement - Follow Nurse / BPA Driven Protocol    Potassium Replacement - Follow Nurse / BPA Driven Protocol    sodium chloride    sodium chloride     Physical Findings          Physical Appearance alert, hard of hearing, oriented, room air   Oral/Mouth Cavity tooth or teeth missing   Edema  no edema   Gastrointestinal abdominal pain, constipation, nausea, vomiting, last bowel movement: 7/29   Skin  skin intact, pale   Tubes/Drains/Lines none   NFPE See Malnutrition Severity Assessment, Unable to perform due to: remote today, Date Completed: 7/30   --  Malnutrition Severity Assessment      Patient meets criteria for : Severe Malnutrition (Based on ASPEN/AND criteria, pt meets nutrition diagnosis of Severe Malnutrition of Chronic Disease due to inadeqaute po intake and 39 lb (18%) wt loss x 6 months.)  Malnutrition Type (last 8 hours)       Malnutrition Severity Assessment       Row Name 07/30/23 1139       Malnutrition Severity Assessment    Malnutrition Type Chronic Disease - Related Malnutrition      Row Name 07/30/23 1139       Insufficient Energy Intake     Insufficient Energy Intake Findings Severe    Insufficient Energy Intake  <75% of est. energy requirement for > or equal to 1 month      Row Name 07/30/23 1139       Unintentional Weight Loss     Unintentional Weight Loss Findings  Severe    Unintentional Weight Loss  Weight loss of 10% in six months  39 lb (18%) wt loss x 6 months      Row Name 07/30/23 1139       Criteria Met (Must meet criteria for severity in at least 2 of these categories: M Wasting, Fat Loss, Fluid, Secondary Signs, Wt. Status, Intake)    Patient meets criteria for  Severe Malnutrition  Based on ASPEN/AND criteria, pt meets nutrition diagnosis of Severe Malnutrition of Chronic Disease due to inadeqaute po intake and 39 lb (18%) wt loss x 6 months.                       Current Nutrition Orders & Evaluation of Intake       Oral Nutrition     Food Allergies NKFA   Current PO Diet Diet: Liquid Diets; Clear Liquid; Texture: Regular Texture (IDDSI 7); Fluid Consistency: Thin (IDDSI 0)   Supplement n/a   PO Evaluation     % PO Intake 75%    # of Days Evaluated 1   --  PES STATEMENT / NUTRITION DIAGNOSIS      Nutrition Dx Problem  Problem: Malnutrition  Etiology: Factors Affecting Nutrition CAROLINA, N/V  Signs/Symptoms: Report of Minimal PO Intake and Unintended Weight Change    Comment:    --  NUTRITION INTERVENTION / PLAN OF CARE      Intervention Goal(s) Maintain nutrition status, Reduce/improve symptoms, Meet estimated needs, Disease management/therapy, Tolerate PO , Advance diet, Maintain weight, and PO intake goal %: 75%         RD Intervention/Action Await begin PO diet, Encourage intake, Follow Tx Progress, Care plan reviewed, and Recommend/ordered:          Prescription/Orders:       PO Diet ADAT to regular diet      Supplements Boost Brz BID while on clears      Snacks       Enteral Nutrition       Parenteral Nutrition    New Prescription Ordered? No, recommended   --      Monitor/Evaluation Per protocol   Discharge Plan/Needs Pending clinical course   Education Will instruct as appropriate   --    RD to follow per protocol.      Electronically signed by:  Shaina Brunner RD  07/30/23 11:16 EDT

## 2023-07-30 NOTE — PLAN OF CARE
Goal Outcome Evaluation:  Plan of Care Reviewed With: patient        Progress: no change  Outcome Evaluation: Pt alert and oriented; [place]; IVF started; IV antibiotics started; F/C remains; GI to see patient; Will continue to monitor

## 2023-07-30 NOTE — PLAN OF CARE
Goal Outcome Evaluation:      Patient is alert. Room air. No nausea. King putting out yellow urine. No complaints of pain.  Ambulated around the unit once and worked with PT.

## 2023-07-30 NOTE — THERAPY EVALUATION
Patient Name: Jose Hurtado  : 1950    MRN: [id number]                              Today's Date: 2023       Admit Date: 2023    Visit Dx:     ICD-10-CM ICD-9-CM   1. Colitis  K52.9 558.9   2. Proctitis  K62.89 569.49   3. Bladder distention  N32.89 596.89   4. Nausea and vomiting, unspecified vomiting type  R11.2 787.01   5. History of esophagectomy  Z98.890 V15.29    Z90.49      Patient Active Problem List   Diagnosis    History of esophageal cancer    Adenomatous polyp of colon    Malignant neoplasm of prostate    RLS (restless legs syndrome)    History of DVT (deep vein thrombosis)    Recurrent major depressive disorder, in partial remission    Hypertension    Anemia    Insomnia due to other mental disorder    Peripheral polyneuropathy    B12 deficiency    Lymphedema    Iron deficiency    Gastroparesis    History of recurrent deep vein thrombosis (DVT)    Tremor of both hands    Gastrointestinal hemorrhage    Acute blood loss anemia    Pancytopenia    Chronic venous hypertension due to DVT    Bleeding hemorrhoid    Malabsorption of iron    Iron deficiency anemia    History of esophageal cancer    Abnormal CT scan    Generalized weakness    Chronic anticoagulation    CKD (chronic kidney disease) stage 2, GFR 60-89 ml/min    Dysphagia    NSTEMI (non-ST elevated myocardial infarction)    Bronchitis    Dyspnea    Elevated troponin    Atrial fibrillation    Chronic obstructive pulmonary disease    Congestive heart failure    Gait instability    Dark stools    Proctitis    Nausea and vomiting    Acute urinary retention     Past Medical History:   Diagnosis Date    Acute deep vein thrombosis (DVT) of popliteal vein of left lower extremity 2020    Anemia     Anti-phospholipid syndrome     On lifelong anticoagulation therapy    Bladder disorder     LEAKAGE   ON MED  wers pads    Bleeding hemorrhoids     Chronic kidney disease     Community acquired pneumonia     HISTORY OF IN     Congestive  heart failure     COPD (chronic obstructive pulmonary disease)     Deep venous thrombosis 2006, 2008    Left lower extremity multiple    Depression     Esophageal carcinoma 12/31/2014    had chemo and radiation prior surgery    Hemorrhoids     HH (hiatus hernia)     History of atrial fibrillation 2015    ONE EPISODE WHILE HOSPITALIZED    History of kidney stones     History of nephrolithiasis     History of pancreatitis     PT STATES MANY YEARS AGO    History of radiation therapy     History of transfusion     Hypertension     Long-term (current) use of anticoagulants, INR goal 2.0-3.0     Lymphedema     Malignant neoplasm of prostate     Other hyperlipidemia 01/30/2018 January 30, 2018 lipid panel risk 12.8%    Restless legs syndrome     Sleep apnea     OCCASIONALLY WEARS CPAP    Squamous carcinoma     on the head     Past Surgical History:   Procedure Laterality Date    APPENDECTOMY  1950    BRONCHOSCOPY      (Diagnostic)    CARDIAC CATHETERIZATION N/A 5/14/2022    Procedure: Left Heart Cath;  Surgeon: Eric So MD;  Location: [facility] CATH INVASIVE LOCATION;  Service: Cardiology;  Laterality: N/A;    CARDIAC CATHETERIZATION N/A 5/14/2022    Procedure: Coronary angiography;  Surgeon: Eric So MD;  Location: [facility] CATH INVASIVE LOCATION;  Service: Cardiology;  Laterality: N/A;    CARDIAC CATHETERIZATION N/A 5/14/2022    Procedure: Left ventriculography;  Surgeon: Eric So MD;  Location:  [facility] CATH INVASIVE LOCATION;  Service: Cardiology;  Laterality: N/A;    CATARACT EXTRACTION Bilateral 2014    COLONOSCOPY  12/15/2014    Complete / Description: EH, IH, torts, stool, follow-up colonoscopy due in 5 years.    COLONOSCOPY N/A 6/13/2017    non-thrombosed external hemorrhoids, normal examined ileum, IH    COLONOSCOPY N/A 2/26/2019    Procedure: COLONOSCOPY with Cold Polypectomy;  Surgeon: Mulugeta Millan MD;  Location: [facility] ENDOSCOPY;  Service: Gastroenterology     COLONOSCOPY N/A 12/16/2020    Procedure: COLONOSCOPY to cecum into TI;  Surgeon: Mesha Sanchez MD;  Location:  [facility] ENDOSCOPY;  Service: Gastroenterology;  Laterality: N/A;  pre: lower GI bleed  post: hemorrhoids     CYSTOSCOPY W/ LASER LITHOTRIPSY      ENDOSCOPY N/A 6/13/2017    Procedure: ESOPHAGOGASTRODUODENOSCOPY WITH COLD BIOPSY;  Surgeon: Lake Gonzalez MD;  [facility];  Service:     ENDOSCOPY N/A 11/18/2021    Procedure: ESOPHAGOGASTRODUODENOSCOPY WITH  DILATATION WITH FLUOROSCOPY;  Surgeon: Jose Christine III, MD;  Location: [facility] ENDOSCOPY;  Service: Gastroenterology;  Laterality: N/A;  Pre: dysphagia, h/x of adinocarcinoma of esophagus  Post: same    ESOPHAGECTOMY      April 2015, stage IIB esophageal carcinoma, sub-total resection.    ESOPHAGECTOMY      Esophagectomy Subtotal [place] Joe Procedure    EXCISION LESION  08/2012    Removal of Squamous Cell CA on Head    HAMMER TOE REPAIR  09/2014    Hammertoe Operation (Each Toe), 10/2014    HAMMER TOE REPAIR Left 10/3/2017    Procedure: Left second third and fourth distal interphalangeal joint resection with flexor tenotomy;  Surgeon: Mulugeta Lira MD;  Location:  [facility] OR [facility];  Service:     HEMORRHOIDECTOMY N/A 12/23/2020    Procedure: HEMORRHOIDECTOMY;  Surgeon: Billy Julio Jr., MD;  Location: [facility] MAIN OR;  Service: General;  Laterality: N/A;    HERNIA REPAIR      incisional    JEJUNOSTOMY      Laparoscopic    JEJUNOSTOMY      tube removal     KNEE SURGERY Bilateral 1967, 1973, 1981    PATELLA SURGERY Left     removed    PILONIDAL CYST / SINUS EXCISION      AR ARTHRP KNE CONDYLE&PLATU MEDIAL&LAT COMPARTMENTS Right 3/26/2018    Procedure: TOTAL KNEE ARTHROPLASTY;  Surgeon: Renny Solis MD;  Location: [facility] MAIN OR;  Service: Orthopedics    PROSTATECTOMY  2010    PYLOROPLASTY      SPINAL FUSION  02/1998    C 5,6    TONSILLECTOMY      UPPER GASTROINTESTINAL ENDOSCOPY  12/15/2014    LA Grade D esophagitis,  Pardo's, HH, multiple duodenal ulcers    VENTRAL/INCISIONAL HERNIA REPAIR N/A 4/14/2016    Procedure: VENTRAL/INCISIONAL HERNIA REPAIR, open, with mesh, and component separation;  Surgeon: Darren Rivas MD;  Location: [facility] OR;  Service:       General Information       Row Name 07/30/23 1220          Physical Therapy Time and Intention    Document Type evaluation  -     Mode of Treatment physical therapy;individual therapy  -       Row Name 07/30/23 1220          General Information    Patient Profile Reviewed yes  -     Prior Level of Function independent:  -     Existing Precautions/Restrictions fall  -     Barriers to Rehab none identified  -       Row Name 07/30/23 1220          Living Environment    People in Home spouse  -       Row Name 07/30/23 1220          Cognition    Orientation Status (Cognition) oriented x 3  -       Row Name 07/30/23 1220          Safety Issues, Functional Mobility    Safety Issues Affecting Function (Mobility) awareness of need for assistance;insight into deficits/self-awareness  -     Impairments Affecting Function (Mobility) balance;postural/trunk control;strength;endurance/activity tolerance  -               User Key  (r) = Recorded By, (t) = Taken By, (c) = Cosigned By      Initials Name Provider Type     Anabella Gallegos PT Physical Therapist                   Mobility       Row Name 07/30/23 1220          Bed Mobility    Bed Mobility supine-sit;sit-supine  -     Supine-Sit [place] (Bed Mobility) standby assist;1 person assist;verbal cues  -     Sit-Supine [place] (Bed Mobility) standby assist;1 person assist;verbal cues  -       Row Name 07/30/23 1220          Sit-Stand Transfer    Sit-Stand [place] (Transfers) contact guard;verbal cues;1 person assist  -     Assistive Device (Sit-Stand Transfers) walker, front-wheeled  -     Comment, (Sit-Stand Transfer) STSx2, first attempt unstable and posterior LOB  -        Row Name 07/30/23 1220          Gait/Stairs (Locomotion)    [place] Level (Gait) contact guard;verbal cues;1 person assist  -     Assistive Device (Gait) walker, front-wheeled  -     Distance in Feet (Gait) 50'  -     Bilateral Gait Deviations forward flexed posture;heel strike decreased  -               User Key  (r) = Recorded By, (t) = Taken By, (c) = Cosigned By      Initials Name Provider Type     Anabella Gallegos, PT Physical Therapist                   Obj/Interventions       Row Name 07/30/23 1221          Range of Motion Comprehensive    General Range of Motion no range of motion deficits identified  -MH       Row Name 07/30/23 1221          Strength Comprehensive (MMT)    General Manual Muscle Testing (MMT) Assessment lower extremity strength deficits identified  -     Comment, General Manual Muscle Testing (MMT) Assessment generalized weakness  -MH       Row Name 07/30/23 1221          Balance    Balance Assessment standing static balance  -     Static Standing Balance standby assist  -     Position/Device Used, Standing Balance walker, front-wheeled  -     Balance Interventions standing  -     Comment, Balance standing for weight check  -MH       Row Name 07/30/23 1221          Sensory Assessment (Somatosensory)    Sensory Assessment (Somatosensory) sensation intact  -               User Key  (r) = Recorded By, (t) = Taken By, (c) = Cosigned By      Initials Name Provider Type     Anabella Gallegos, PT Physical Therapist                   Goals/Plan       Row Name 07/30/23 1224          Bed Mobility Goal 1 (PT)    Activity/Assistive Device (Bed Mobility Goal 1, PT) bed mobility activities, all  -     [place] Level/Cues Needed (Bed Mobility Goal 1, PT) independent  -     Time Frame (Bed Mobility Goal 1, PT) 1 week  -MH       Row Name 07/30/23 1224          Transfer Goal 1 (PT)    Activity/Assistive Device (Transfer Goal 1, PT)  sit-to-stand/stand-to-sit;bed-to-chair/chair-to-bed  -     [place] Level/Cues Needed (Transfer Goal 1, PT) standby assist  -     Time Frame (Transfer Goal 1, PT) 1 week  -       Row Name 07/30/23 1224          Gait Training Goal 1 (PT)    Activity/Assistive Device (Gait Training Goal 1, PT) gait (walking locomotion);walker, rolling  -     [place] Arm Level (Gait Training Goal 1, PT) standby assist  -     Distance (Gait Training Goal 1, PT) 100  -MH     Time Frame (Gait Training Goal 1, PT) 1 week  -       Row Name 07/30/23 1224          Stairs Goal 1 (PT)    Activity/Assistive Device (Stairs Goal 1, PT) ascending stairs;descending stairs  -     [place] Level/Cues Needed (Stairs Goal 1, PT) standby assist  -     Number of Stairs (Stairs Goal 1, PT) 4  -MH     Time Frame (Stairs Goal 1, PT) 1 week  -       Row Name 07/30/23 1224          Therapy Assessment/Plan (PT)    Planned Therapy Interventions (PT) balance training;gait training;home exercise program;postural re-education;transfer training;stretching;strengthening;ROM (range of motion);stair training;neuromuscular re-education;bed mobility training  -               User Key  (r) = Recorded By, (t) = Taken By, (c) = Cosigned By      Initials Name Provider Type     Anabella Gallegos, PT Physical Therapist                   Clinical Impression       Row Name 07/30/23 1222          Pain    Pretreatment Pain Rating 0/10 - no pain  -     Posttreatment Pain Rating 0/10 - no pain  -       Row Name 07/30/23 1222          Plan of Care Review    Plan of Care Reviewed With patient  -     Outcome Evaluation Pt is a 73 y.o. male admitted to [facility] with c/o nausea and vomiting and dry heaving on 7/29/2023. Work up reveals proctitis/colitis in addition to urinary retention and hydronephrosis.. Pt presents today with impaired strength, endurance, and decreased functional mobility. Pt required SBA for bed mobility, CGAc for STS transfers, and CGA  for ambulation with rwx for 50 ft. Required 2 attempts at STS with posterior LOB on first attempt. Pt will benefit from skilled PT to address the previous deficits and improve overall safety with functional mobility. Prior to admission pt lives with wife in home and uses rwx or cane for mobility. Anticipate pt will D/C to home, may benefit from  PT pending progress with mobility as wife has limited ability to physically assist patient.  -       Row Name 07/30/23 1222          Therapy Assessment/Plan (PT)    Rehab Potential (PT) fair, will monitor progress closely  -     Criteria for Skilled Interventions Met (PT) yes  -     Therapy Frequency (PT) 5 times/wk  -     Predicted Duration of Therapy Intervention (PT) 1 week  -       Row Name 07/30/23 1222          Positioning and Restraints    Pre-Treatment Position in bed  -     Post Treatment Position bed  -MH     In Bed encouraged to call for assist;call light within reach;exit alarm on;supine;notified ns  -               User Key  (r) = Recorded By, (t) = Taken By, (c) = Cosigned By      Initials Name Provider Type     Anabella Gallegos, PT Physical Therapist                   Outcome Measures       Row Name 07/30/23 9043          How much help from another person do you currently need...    Turning from your back to your side while in flat bed without using bedrails? 4  -MH     Moving from lying on back to sitting on the side of a flat bed without bedrails? 4  -MH     Moving to and from a bed to a chair (including a wheelchair)? 3  -MH     Standing up from a chair using your arms (e.g., wheelchair, bedside chair)? 4  -MH     Climbing 3-5 steps with a railing? 2  -MH     To walk in hospital room? 3  -MH     AM-PAC 6 Clicks Score (PT) 20  -     Highest level of mobility 6 --> Walked 10 steps or more  -       Row Name 07/30/23 0627          Functional Assessment    Outcome Measure Options AM-PAC 6 Clicks Basic Mobility (PT)  -               User  Key  (r) = Recorded By, (t) = Taken By, (c) = Cosigned By      Initials Name Provider Type     Anabella Gallegos PT Physical Therapist                                 Physical Therapy Education       Title: PT OT SLP Therapies (In Progress)       Topic: Physical Therapy (In Progress)       Point: Mobility training (Done)       Learning Progress Summary             Patient Acceptance, E,D,TB, VU,DU,NR by  at 7/30/2023 1225                         Point: Home exercise program (Not Started)       Learner Progress:  Not documented in this visit.              Point: Body mechanics (Not Started)       Learner Progress:  Not documented in this visit.              Point: Precautions (Not Started)       Learner Progress:  Not documented in this visit.                              User Key       Initials Effective Dates Name Provider Type Discipline     05/26/20 -  Anabella Gallegos PT Physical Therapist PT                  PT Recommendation and Plan  Planned Therapy Interventions (PT): balance training, gait training, home exercise program, postural re-education, transfer training, stretching, strengthening, ROM (range of motion), stair training, neuromuscular re-education, bed mobility training  Plan of Care Reviewed With: patient  Outcome Evaluation: Pt is a 73 y.o. male admitted to [facility] with c/o nausea and vomiting and dry heaving on 7/29/2023. Work up reveals proctitis/colitis in addition to urinary retention and hydronephrosis.. Pt presents today with impaired strength, endurance, and decreased functional mobility. Pt required SBA for bed mobility, CGAc for STS transfers, and CGA for ambulation with rwx for 50 ft. Required 2 attempts at STS with posterior LOB on first attempt. Pt will benefit from skilled PT to address the previous deficits and improve overall safety with functional mobility. Prior to admission pt lives with wife in home and uses rwx or cane for mobility. Anticipate pt will D/C to home, may benefit  from  PT pending progress with mobility as wife has limited ability to physically assist patient.     Time Calculation:         PT Charges       Row Name 07/30/23 1225             Time Calculation    Start Time 1144  -      Stop Time 1205  -      Time Calculation (min) 21 min  -      PT Received On 07/30/23  -      PT - Next Appointment 07/31/23  -      PT Goal Re-Cert Due Date 08/06/23  -         Time Calculation- PT    Total Timed Code Minutes- PT 10 minute(s)  -         Timed Charges    85541 - PT Therapeutic Activity Minutes 10  -MH         Total Minutes    Timed Charges Total Minutes 10  -MH       Total Minutes 10  -MH                User Key  (r) = Recorded By, (t) = Taken By, (c) = Cosigned By      Initials Name Provider Type     Anabella Gallegos, PT Physical Therapist                  Therapy Charges for Today       Code Description Service Date Service Provider Modifiers Qty    64543420409 HC PT THERAPEUTIC ACT EA 15 MIN 7/30/2023 Anabella Gallegos, PT GP 1    78914993912 HC PT EVAL MOD COMPLEXITY 1 7/30/2023 Anabella Gallegos, PT GP 1            PT G-Codes  Outcome Measure Options: AM-PAC 6 Clicks Basic Mobility (PT)  AM-PAC 6 Clicks Score (PT): 20  PT Discharge Summary  Anticipated Discharge Disposition (PT): home with home health, home with assist    Anabella Gallegos PT  7/30/2023

## 2023-07-30 NOTE — CONSULTS
[facility], [place], operated by [facility]  Initial Inpatient Consult Note    Referring Provider: Dylon    Reason for Consultation: Abdominal pain    Subjective     History of present illness:    73 y.o. male with with a history of hypertension depression iron deficiency multiple DVTs, history of esophageal cancer status post esophagectomy, COPD, pancreatitis prostate cancer sleep apnea, evaluated in January of this year with melena was not seen by GI and they recommended Hemoccult cards which were never done, admitted yesterday with abdominal pain worse with movement better at rest.  Constipation, found to have bilateral mild hydronephrosis and rectal wall thickening on CT consistent with proctitis likely stercoral ulceration.  He is currently eating breakfast and feels well this morning    Colonoscopy 2019 hemorrhoids and 1 polyp removed  Colonoscopy 2020 bleeding hemorrhoids that required surgical evaluation Dr. Julio and eventually hemorrhoidectomy    Past Medical History:  Past Medical History:   Diagnosis Date    Acute deep vein thrombosis (DVT) of popliteal vein of left lower extremity 03/24/2020    Anemia     Anti-phospholipid syndrome     On lifelong anticoagulation therapy    Bladder disorder     LEAKAGE   ON MED  wers pads    Bleeding hemorrhoids     Chronic kidney disease     Community acquired pneumonia     HISTORY OF IN 2014    Congestive heart failure     COPD (chronic obstructive pulmonary disease)     Deep venous thrombosis 2006, 2008    Left lower extremity multiple    Depression     Esophageal carcinoma 12/31/2014    had chemo and radiation prior surgery    Hemorrhoids     HH (hiatus hernia)     History of atrial fibrillation 2015    ONE EPISODE WHILE HOSPITALIZED    History of kidney stones     History of nephrolithiasis     History of pancreatitis     PT STATES MANY YEARS AGO    History of radiation therapy     History of transfusion     Hypertension     Long-term (current) use of anticoagulants, INR  goal 2.0-3.0     Lymphedema     Malignant neoplasm of prostate     Other hyperlipidemia 01/30/2018 January 30, 2018 lipid panel risk 12.8%    Restless legs syndrome     Sleep apnea     OCCASIONALLY WEARS CPAP    Squamous carcinoma     on the head     Past Surgical History:  Past Surgical History:   Procedure Laterality Date    APPENDECTOMY  1950    BRONCHOSCOPY      (Diagnostic)    CARDIAC CATHETERIZATION N/A 5/14/2022    Procedure: Left Heart Cath;  Surgeon: Eric So MD;  Location: [facility] CATH INVASIVE LOCATION;  Service: Cardiology;  Laterality: N/A;    CARDIAC CATHETERIZATION N/A 5/14/2022    Procedure: Coronary angiography;  Surgeon: Eric So MD;  [facility];  Service: Cardiology;  Laterality: N/A;    CARDIAC CATHETERIZATION N/A 5/14/2022    Procedure: Left ventriculography;  Surgeon: Eric So MD;  Location: [facility] CATH INVASIVE LOCATION;  Service: Cardiology;  Laterality: N/A;    CATARACT EXTRACTION Bilateral 2014    COLONOSCOPY  12/15/2014    Complete / Description: EH, IH, torts, stool, follow-up colonoscopy due in 5 years.    COLONOSCOPY N/A 6/13/2017    non-thrombosed external hemorrhoids, normal examined ileum, IH    COLONOSCOPY N/A 2/26/2019    Procedure: COLONOSCOPY with Cold Polypectomy;  Surgeon: Mulugeta Millan MD;  Location: [facility] ENDOSCOPY;  Service: Gastroenterology    COLONOSCOPY N/A 12/16/2020    Procedure: COLONOSCOPY to cecum into TI;  Surgeon: Mesha Sanchez MD;  Location: [facility] ENDOSCOPY;  Service: Gastroenterology;  Laterality: N/A;  pre: lower GI bleed  post: hemorrhoids     CYSTOSCOPY W/ LASER LITHOTRIPSY      ENDOSCOPY N/A 6/13/2017    Procedure: ESOPHAGOGASTRODUODENOSCOPY WITH COLD BIOPSY;  Surgeon: Lake Gonzalez MD;  Location: [facility] ENDOSCOPY;  Service:     ENDOSCOPY N/A 11/18/2021    Procedure: ESOPHAGOGASTRODUODENOSCOPY WITH  DILATATION WITH FLUOROSCOPY;  Surgeon: Jose Christine III, MD;   Location: [facility] ENDOSCOPY;  Service: Gastroenterology;  Laterality: N/A;  Pre: dysphagia, h/x of adinocarcinoma of esophagus  Post: same    ESOPHAGECTOMY      2015, stage IIB esophageal carcinoma, sub-total resection.    ESOPHAGECTOMY      Esophagectomy Subtotal Andrea Joe Procedure    EXCISION LESION  2012    Removal of Squamous Cell CA on Head    HAMMER TOE REPAIR  2014    Hammertoe Operation (Each Toe), 10/2014    HAMMER TOE REPAIR Left 10/3/2017    Procedure: Left second third and fourth distal interphalangeal joint resection with flexor tenotomy;  Surgeon: Mulugeta Lira MD;  Location: [facility] OR OSC;  Service:     HEMORRHOIDECTOMY N/A 2020    Procedure: HEMORRHOIDECTOMY;  Surgeon: Billy Julio Jr., MD;  Location: [facility] OR;  Service: General;  Laterality: N/A;    HERNIA REPAIR      incisional    JEJUNOSTOMY      Laparoscopic    JEJUNOSTOMY      tube removal     KNEE SURGERY Bilateral , ,     PATELLA SURGERY Left     removed    PILONIDAL CYST / SINUS EXCISION      NE ARTHRP KNE CONDYLE&PLATU MEDIAL&LAT COMPARTMENTS Right 3/26/2018    Procedure: TOTAL KNEE ARTHROPLASTY;  Surgeon: Renny Solis MD;  Location: [facility] OR;  Service: Orthopedics    PROSTATECTOMY  2010    PYLOROPLASTY      SPINAL FUSION  1998    C 5,6    TONSILLECTOMY      UPPER GASTROINTESTINAL ENDOSCOPY  12/15/2014    LA Grade D esophagitis, Pardo's, HH, multiple duodenal ulcers    VENTRAL/INCISIONAL HERNIA REPAIR N/A 2016    Procedure: VENTRAL/INCISIONAL HERNIA REPAIR, open, with mesh, and component separation;  Surgeon: Darren Rivas MD;  Location: [facility] OR;  Service:       Social History:   Social History     Tobacco Use    Smoking status: Former     Packs/day: 2.00     Years: 3.00     Pack years: 6.00     Types: Cigarettes     Quit date: 1974     Years since quittin.6    Smokeless tobacco: Former     Types: Chew    Tobacco comments:     Caffeine - coffee and  soda    Substance Use Topics    Alcohol use: Yes     Alcohol/week: 3.0 standard drinks     Types: 1 Glasses of wine, 1 Cans of beer, 1 Shots of liquor per week     Comment: 2      Family History:  Family History   Problem Relation Age of Onset    Cerebral aneurysm Mother         cerebral artery aneurysm ( age 56)    Anxiety disorder Father     Suicide Attempts Father          of suicide    Cancer Father         bladder    Prostate cancer Brother 68    No Known Problems Brother     Pancreatic cancer Nephew     Colon cancer Neg Hx     Esophageal cancer Neg Hx     Dementia Neg Hx     Malig Hyperthermia Neg Hx        Home Meds:  Medications Prior to Admission   Medication Sig Dispense Refill Last Dose    atorvastatin (LIPITOR) 40 MG tablet Take 1 tablet by mouth Every Night. 90 tablet 3     cyanocobalamin 1000 MCG/ML injection INJECT 1 ML INTRAMUSCULARLY ONCE EVERY 30 DAYS AS DIRECTED 1 mL 11     Eliquis 5 MG tablet tablet TAKE ONE TABLET BY MOUTH EVERY 12 HOURS 60 tablet 1     furosemide (LASIX) 40 MG tablet Take 1 tablet by mouth 2 (Two) Times a Day.       Gemtesa 75 MG tablet Daily.       metoprolol tartrate (LOPRESSOR) 50 MG tablet Take 1 tablet by mouth 2 (Two) Times a Day for 60 days. 60 tablet 1     pantoprazole (PROTONIX) 40 MG EC tablet TAKE ONE TABLET BY MOUTH TWICE A DAY BEFORE A MEAL 180 tablet 3     PARoxetine (PAXIL) 40 MG tablet TAKE ONE TABLET BY MOUTH EVERY MORNING 30 tablet 5     potassium chloride (MICRO-K) 10 MEQ CR capsule Take 1 capsule by mouth Daily. 90 capsule 5     pramipexole (MIRAPEX) 1.5 MG tablet TAKE ONE TABLET BY MOUTH TWICE A  tablet 1     tiotropium bromide monohydrate (Spiriva Respimat) 2.5 MCG/ACT aerosol solution inhaler Inhale 2 puffs Daily. 4 g 2     tiotropium bromide-olodaterol (Stiolto Respimat) 2.5-2.5 MCG/ACT aerosol solution inhaler Inhale Daily.        Current Meds:   apixaban, 5 mg, Oral, Q12H  cefTRIAXone, 1,000 mg, Intravenous, Q24H  metoprolol tartrate, 50  mg, Oral, BID  metroNIDAZOLE, 500 mg, Intravenous, Q8H  pantoprazole, 40 mg, Oral, BID AC  PARoxetine, 40 mg, Oral, QAM  senna-docusate sodium, 2 tablet, Oral, BID  sodium chloride, 10 mL, Intravenous, Q12H  tiotropium bromide monohydrate, 2 puff, Inhalation, Daily      Allergies:  No Known Allergies  Review of Systems  There is weakness and fatigue all other systems reviewed and negative     Objective     Vital Signs  Temp:  [96.2 øF (35.7 øC)-97.7 øF (36.5 øC)] 97.5 øF (36.4 øC)  Heart Rate:  [50-53] 53  Resp:  [16-18] 16  BP: (117-135)/(60-78) 125/66  Physical Exam:  General Appearance:    Alert, cooperative, in no acute distress   Head:    Normocephalic, without obvious abnormality, atraumatic   Eyes:          conjunctivae and sclerae normal, no   icterus   Throat:   no thrush, oral mucosa moist   Neck:   Supple, no adenopathy   Lungs:     Clear to auscultation bilaterally    Heart:    Regular rhythm and normal rate    Chest Wall:    No abnormalities observed   Abdomen:     Soft, nondistended, nontender; normal bowel sounds   Extremities:   no edema, no redness   Skin:   No bruising or rash   Psychiatric:  normal mood and insight     Results Review:   I reviewed the patient's new clinical results.    Results from last 7 days   Lab Units 07/29/23  1137 07/27/23  1138   WBC 10*3/mm3 3.19*  --    HEMOGLOBIN g/dL 11.6* 11.1*   HEMATOCRIT % 34.5* 33.8*   PLATELETS 10*3/mm3 194  --      Results from last 7 days   Lab Units 07/29/23  1137   SODIUM mmol/L 139   POTASSIUM mmol/L 4.3   CHLORIDE mmol/L 103   CO2 mmol/L 25.0   BUN mg/dL 18   CREATININE mg/dL 1.17   CALCIUM mg/dL 9.1   BILIRUBIN mg/dL 0.4   ALK PHOS U/L 109   ALT (SGPT) U/L 15   AST (SGOT) U/L 20   GLUCOSE mg/dL 136*         Lab Results   Lab Value Date/Time    LIPASE 25 07/29/2023 1137    LIPASE 18 08/30/2016 2057       Radiology:  CT Abdomen Pelvis With Contrast   Final Result   1. Rectal wall thickening with surrounding stranding/inflammation   consistent  with focal colitis/proctitis.   2. Moderate distention of the urinary bladder. There is also hydroureter   and mild hydronephrosis with distention of the renal pelvises without   evidence for obstructing stone. Small nonobstructing renal stones are   present.   3. Previous esophagectomy with gastric pull-through. Chronic loculated   right basal pleural effusion with surrounding pleural thickening,   similar to prior exam 03/29/2023.       Radiation dose reduction techniques were utilized, including automated   exposure control and exposure modulation based on body size.       This report was finalized on 7/29/2023 1:59 PM by Dr. Andrade Boo M.D.          XR Chest 1 View   Final Result   No focal pulmonary consolidation. Minimal right pleural   effusion.       This report was finalized on 7/29/2023 12:35 PM by Dr. Sumanth Boyer M.D.              Assessment & Plan   Active Hospital Problems    Diagnosis     **Proctitis     Nausea and vomiting     Acute urinary retention     Chronic obstructive pulmonary disease     Atrial fibrillation     CKD (chronic kidney disease) stage 2, GFR 60-89 ml/min     History of esophageal cancer     Iron deficiency     Lymphedema     Peripheral polyneuropathy     Anemia     Hypertension     Malignant neoplasm of prostate        Assessment:  Abdominal pain  Stercoral proctitis  Constipation  Hydronephrosis  Nausea and vomiting resolved  COPD  A-fib-has electively taken himself off of Eliquis  History of esophageal cancer status post esophagectomy  History of bleeding hemorrhoids 2020 with hemorrhoidectomy with Dr. Julio  History of prostate cancer  Anemia    Plan:  Begin bowel regimen with MiraLAX Colace and Dulcolax  Consider adding Amitiza at discharge for history of constipation  I do not think a full colonoscopy is necessary had one 3 years ago but consider flex sig before discharge to evaluate stercoral ulceration  Follow hemoglobin  Agree with King catheter and  antibiotic treatment  We will follow      I discussed the patients findings and my recommendations with patient and nursing staff.    Jameel Valencia MD

## 2023-07-30 NOTE — PLAN OF CARE
Problem: Malnutrition  Goal: Improved Nutritional Intake  Outcome: Ongoing, Progressing  Intervention: Promote and Optimize Oral Intake  Flowsheets (Taken 7/30/2023 1213)  Oral Nutrition Promotion: calorie-dense liquids provided   Goal Outcome Evaluation:   Boost Breeze BID ordered. Advance diet as tolerates.

## 2023-07-31 ENCOUNTER — TELEPHONE (OUTPATIENT)
Dept: INTERNAL MEDICINE | Age: 73
End: 2023-07-31

## 2023-07-31 PROCEDURE — 25010000002 SODIUM CHLORIDE 0.9 % WITH KCL 20 MEQ 20-0.9 MEQ/L-% SOLUTION: Performed by: INTERNAL MEDICINE

## 2023-07-31 PROCEDURE — 97530 THERAPEUTIC ACTIVITIES: CPT

## 2023-07-31 PROCEDURE — 99232 SBSQ HOSP IP/OBS MODERATE 35: CPT | Performed by: NURSE PRACTITIONER

## 2023-07-31 PROCEDURE — 25010000002 METRONIDAZOLE 500 MG/100ML SOLUTION: Performed by: INTERNAL MEDICINE

## 2023-07-31 PROCEDURE — 94640 AIRWAY INHALATION TREATMENT: CPT

## 2023-07-31 PROCEDURE — 94799 UNLISTED PULMONARY SVC/PX: CPT

## 2023-07-31 PROCEDURE — 25010000002 CEFTRIAXONE PER 250 MG: Performed by: INTERNAL MEDICINE

## 2023-07-31 PROCEDURE — 96361 HYDRATE IV INFUSION ADD-ON: CPT

## 2023-07-31 PROCEDURE — 92610 EVALUATE SWALLOWING FUNCTION: CPT

## 2023-07-31 RX ADMIN — METRONIDAZOLE 500 MG: 500 INJECTION, SOLUTION INTRAVENOUS at 01:12

## 2023-07-31 RX ADMIN — APIXABAN 5 MG: 5 TABLET, FILM COATED ORAL at 22:25

## 2023-07-31 RX ADMIN — PANTOPRAZOLE SODIUM 40 MG: 40 TABLET, DELAYED RELEASE ORAL at 08:41

## 2023-07-31 RX ADMIN — PANTOPRAZOLE SODIUM 40 MG: 40 TABLET, DELAYED RELEASE ORAL at 18:11

## 2023-07-31 RX ADMIN — METRONIDAZOLE 500 MG: 500 INJECTION, SOLUTION INTRAVENOUS at 16:20

## 2023-07-31 RX ADMIN — POTASSIUM CHLORIDE AND SODIUM CHLORIDE 100 ML/HR: 900; 150 INJECTION, SOLUTION INTRAVENOUS at 16:20

## 2023-07-31 RX ADMIN — APIXABAN 5 MG: 5 TABLET, FILM COATED ORAL at 08:41

## 2023-07-31 RX ADMIN — PAROXETINE HYDROCHLORIDE HEMIHYDRATE 40 MG: 20 TABLET, FILM COATED ORAL at 08:41

## 2023-07-31 RX ADMIN — BISACODYL 5 MG: 5 TABLET ORAL at 08:41

## 2023-07-31 RX ADMIN — Medication 10 ML: at 22:28

## 2023-07-31 RX ADMIN — POLYETHYLENE GLYCOL 3350 17 G: 17 POWDER, FOR SOLUTION ORAL at 08:47

## 2023-07-31 RX ADMIN — TAMSULOSIN HYDROCHLORIDE 0.4 MG: 0.4 CAPSULE ORAL at 08:41

## 2023-07-31 RX ADMIN — METRONIDAZOLE 500 MG: 500 INJECTION, SOLUTION INTRAVENOUS at 08:41

## 2023-07-31 RX ADMIN — Medication 10 ML: at 08:42

## 2023-07-31 RX ADMIN — CEFTRIAXONE SODIUM 1000 MG: 1 INJECTION, POWDER, FOR SOLUTION INTRAMUSCULAR; INTRAVENOUS at 23:30

## 2023-07-31 RX ADMIN — TIOTROPIUM BROMIDE INHALATION SPRAY 2 PUFF: 3.12 SPRAY, METERED RESPIRATORY (INHALATION) at 07:52

## 2023-07-31 RX ADMIN — SENNOSIDES AND DOCUSATE SODIUM 2 TABLET: 50; 8.6 TABLET ORAL at 08:41

## 2023-07-31 NOTE — CASE MANAGEMENT/SOCIAL WORK
Continued Stay Note  [facility]     Patient Name: Jose Hurtado  MRN: [id number]  Today's Date: 7/31/2023    Admit Date: 7/29/2023    Plan: Home with family to transport   Discharge Plan       Row Name 07/31/23 1455       Plan    Plan Comments Referral for [facility] for PT. Spoke with Malu/ [facility].      Row Name 07/31/23 1454       Plan    Plan Home with family to transport    Patient/Family in Agreement with Plan yes    Plan Comments Spoke with patient and spouse/ Lena at bedside. Introduced self and explained CCP role. Verified face sheet and local pharmacy is [facility]. Patient denies difficulty with medication cost/ management. Patient lives in patio home with spouse, no PORTIA. Patient has used [facility] in the past and would use again if needed. Patient has been to [facility] more than 5 years ago, and if SNF is needed would like new list. Patient states that he has a cane, rolling walker, grab bars, and Cpap through [facility]. Patient is [employer] and drives. Patient plans to return home with spouse to transport. Denies further needs at this time.                   Discharge Codes    No documentation.                       Jannette Campbell RN

## 2023-07-31 NOTE — PLAN OF CARE
Goal Outcome Evaluation:      Patient A&Ox4 continues flagyl & rocephin. NS+20k@100/hr.  King in place.  [place].  Up with assist with walker.  VSS.

## 2023-07-31 NOTE — PLAN OF CARE
Goal Outcome Evaluation:              Outcome Evaluation: Clinical swallow evaluation completed. Patient with history of esophageal cancer and esophagectomy. Patient known to  outpatient services with April 2023 discharge indicating noncompliance with home exercise program. Previous VFSS 3/2023 recommended soft/whole diet with thin liquids. This date, patient with no overt s/s of aspiration with ice chips, thins by spoon/cup/straw, puree, soft/chopped solids, or mixed consistency. Recommend patient initiate soft/whole diet with thin liquids. Meds whole with thins or puree, as tolerated. Sitting upright, slow rate, small bites/sips, alternate solids/liquids. Patient expressed interest in swallow exercises to strengthen swallow; clinician reminded patient of exercises and EMST device provided to patient during outpatient services. Speech to follow for diet tolerance.

## 2023-07-31 NOTE — PROGRESS NOTES
Gastroenterology   Inpatient Progress Note    Reason for Follow Up: Abdominal pain, abnormal imaging with proctitis/colitis    Subjective  Interval History:   Patient had improvement in abdominal pain, bowel movement without rectal bleeding or melena.    Current Facility-Administered Medications:     acetaminophen (TYLENOL) tablet 650 mg, 650 mg, Oral, Q4H PRN **OR** acetaminophen (TYLENOL) 160 MG/5ML solution 650 mg, 650 mg, Oral, Q4H PRN **OR** acetaminophen (TYLENOL) suppository 650 mg, 650 mg, Rectal, Q4H PRN, Jose Osborne MD    albuterol (PROVENTIL) nebulizer solution 0.083% 2.5 mg/3mL, 2.5 mg, Nebulization, Q6H PRN, Jose Osborne MD    apixaban (ELIQUIS) tablet 5 mg, 5 mg, Oral, Q12H, Jose Osborne MD, 5 mg at 07/31/23 0841    sennosides-docusate (PERICOLACE) 8.6-50 MG per tablet 2 tablet, 2 tablet, Oral, BID, 2 tablet at 07/31/23 0841 **AND** polyethylene glycol (MIRALAX) packet 17 g, 17 g, Oral, Daily, 17 g at 07/31/23 0847 **AND** bisacodyl (DULCOLAX) EC tablet 5 mg, 5 mg, Oral, Daily, 5 mg at 07/31/23 0841 **AND** bisacodyl (DULCOLAX) suppository 10 mg, 10 mg, Rectal, Daily PRN, Jameel Valencia MD    Calcium Replacement - Follow Nurse / BPA Driven Protocol, , Does not apply, PRN, Jose Osborne MD    cefTRIAXone (ROCEPHIN) 1,000 mg in sodium chloride 0.9 % 100 mL IVPB-VTB, 1,000 mg, Intravenous, Q24H, oJse Osborne MD, Last Rate: 200 mL/hr at 07/30/23 2357, 1,000 mg at 07/30/23 2357    Magnesium Standard Dose Replacement - Follow Nurse / BPA Driven Protocol, , Does not apply, PRN, Jose Osborne MD    metoprolol tartrate (LOPRESSOR) tablet 50 mg, 50 mg, Oral, BID, DylonJose bunn MD, 50 mg at 07/30/23 2011    metroNIDAZOLE (FLAGYL) IVPB 500 mg, 500 mg, Intravenous, Q8H, Jose Osborne MD, Last Rate: 100 mL/hr at 07/31/23 1620, 500 mg at 07/31/23 1620    ondansetron (ZOFRAN) injection 4 mg, 4 mg, Intravenous, Q6H PRN, Jose Osborne MD    pantoprazole (PROTONIX) EC tablet 40 mg, 40 mg, Oral, BID AC, Dylon,  MD Jose, 40 mg at 07/31/23 0841    PARoxetine (PAXIL) tablet 40 mg, 40 mg, Oral, QAM, Jose Osborne MD, 40 mg at 07/31/23 0841    Phosphorus Replacement - Follow Nurse / BPA Driven Protocol, , Does not apply, PRN, Jose Osborne MD    Potassium Replacement - Follow Nurse / BPA Driven Protocol, , Does not apply, PRN, Jose Osborne MD    sodium chloride 0.9 % flush 10 mL, 10 mL, Intravenous, Q12H, Jose Osborne MD, 10 mL at 07/31/23 0842    sodium chloride 0.9 % flush 10 mL, 10 mL, Intravenous, PRN, Jose Osborne MD    sodium chloride 0.9 % infusion 40 mL, 40 mL, Intravenous, PRN, Jose Osborne MD    sodium chloride 0.9 % with KCl 20 mEq/L infusion, 100 mL/hr, Intravenous, Continuous, Jose Osborne MD, Last Rate: 100 mL/hr at 07/31/23 1620, 100 mL/hr at 07/31/23 1620    tamsulosin (FLOMAX) 24 hr capsule 0.4 mg, 0.4 mg, Oral, Daily, Poncho Doss, APRN, 0.4 mg at 07/31/23 0841    tiotropium (SPIRIVA RESPIMAT) 2.5 mcg/act aerosol solution inhaler, 2 puff, Inhalation, Daily, Jose Osborne MD, 2 puff at 07/31/23 0752  Review of Systems:                Gastroenterology positive for constipation, improving    Objective     Vital Signs  Temp:  [97.5 øF (36.4 øC)-97.8 øF (36.6 øC)] 97.7 øF (36.5 øC)  Heart Rate:  [47-51] 51  Resp:  [16] 16  BP: (124-141)/(64-81) 135/71  Body mass index is 20.5 kg/mý.                  Physical Exam:              General: patient awake, alert and cooperative, chronically ill but stable              Eyes: no scleral icterus              Skin: warm and dry, not jaundiced              Abdomen: soft, normal bowel sounds, nontender, nondistended              Psychiatric: Appropriate affect and behavior                Results Review:                I reviewed the patient's new clinical results.    Results from last 7 days   Lab Units 07/30/23  0827 07/29/23  1137 07/27/23  1138   WBC 10*3/mm3 2.58* 3.19*  --    HEMOGLOBIN g/dL 10.6* 11.6* 11.1*   HEMATOCRIT % 31.9* 34.5* 33.8*   PLATELETS  10*3/mm3 157 194  --      Results from last 7 days   Lab Units 07/30/23  0827 07/29/23  1137   SODIUM mmol/L 140 139   POTASSIUM mmol/L 4.2 4.3   CHLORIDE mmol/L 107 103   CO2 mmol/L 24.6 25.0   BUN mg/dL 15 18   CREATININE mg/dL 1.03 1.17   CALCIUM mg/dL 8.4* 9.1   BILIRUBIN mg/dL 0.3 0.4   ALK PHOS U/L 78 109   ALT (SGPT) U/L 13 15   AST (SGOT) U/L 18 20   GLUCOSE mg/dL 84 136*         Lab Results   Lab Value Date/Time    LIPASE 25 07/29/2023 1137    LIPASE 18 08/30/2016 2057       Radiology:  CT Abdomen Pelvis With Contrast   Final Result   1. Rectal wall thickening with surrounding stranding/inflammation   consistent with focal colitis/proctitis.   2. Moderate distention of the urinary bladder. There is also hydroureter   and mild hydronephrosis with distention of the renal pelvises without   evidence for obstructing stone. Small nonobstructing renal stones are   present.   3. Previous esophagectomy with gastric pull-through. Chronic loculated   right basal pleural effusion with surrounding pleural thickening,   similar to prior exam 03/29/2023.       Radiation dose reduction techniques were utilized, including automated   exposure control and exposure modulation based on body size.       This report was finalized on 7/29/2023 1:59 PM by Dr. Andrade Boo M.D.          XR Chest 1 View   Final Result   No focal pulmonary consolidation. Minimal right pleural   effusion.       This report was finalized on 7/29/2023 12:35 PM by Dr. Sumanth Boyer M.D.              Assessment & Plan     Active Hospital Problems    Diagnosis     **Proctitis     Nausea and vomiting     Acute urinary retention     Chronic obstructive pulmonary disease     Atrial fibrillation     CKD (chronic kidney disease) stage 2, GFR 60-89 ml/min     History of esophageal cancer     Iron deficiency     Lymphedema     Peripheral polyneuropathy     Anemia     Hypertension     Malignant neoplasm of prostate        Assessment:  Abdominal pain  resolved  Abnormal CT scan with Rectal wall thickening with surrounding stranding/inflammation  consistent with focal colitis/proctitis.  Hydronephrosis with urinary retention  History of prostate cancer status post radiation 2014  History of esophageal cancer status post esophagectomy  History of bleeding hemorrhoids status post hemorrhoidectomy with Dr. Julio 2020        Plan:  Continue aggressive bowel regimen  Will reassess tomorrow, to consider flexible sigmoidoscopy for direct visualization of abnormal findings on CT scan    I discussed the patients findings and my recommendations with patient and nursing staff.           Rachel MCKINNEY  [facility]  [address] [place] [address]

## 2023-07-31 NOTE — PLAN OF CARE
Goal Outcome Evaluation:  Plan of Care Reviewed With: patient        Progress: improving  Outcome Evaluation: Pt received in bed upon arrival and agreeable to PT. Pt completed supine to sit, STS transfer, and ambulated 400' c RW requiring SBA/CGA. Pt demo's a slow pace, narrow NOEL, forward flexed posture, and sustained B knee flexion. VC provided throughout mobility to correct deficits. Pt improving as demonstrated with increased activity tolerance. Pt sitting EOB awaiting aide to ambulate before lunch. Pt will continue to benefit from skilled PT to address strength and functional mobility.      Anticipated Discharge Disposition (PT): home with home health, home with assist

## 2023-07-31 NOTE — DISCHARGE PLACEMENT REQUEST
"Jose Bourgeois (73 y.o. Male)       Date of Birth   1950    Social Security Number       Address   [address] [address]    Home Phone   [phone]    MRN   [id number]       [facility]    Marital Status                               Admission Date   7/29/23    Admission Type   Emergency    Admitting Provider   Jose Osborne MD    Attending Provider   Jose Osborne MD    Department, Room/Bed   [facility] [address]       Discharge Date       Discharge Disposition       Discharge Destination                                 Attending Provider: Jose Osborne MD    Allergies: No Known Allergies    Isolation: None   Infection: None   Code Status: CPR    Ht: 195.6 cm (77\")   Wt: 78.4 kg (172 lb 13.5 oz)    Admission Cmt: None   Principal Problem: Proctitis [K62.89]                   Active Insurance as of 7/29/2023       Primary Coverage       Payor Plan Insurance Group Employer/Plan Group    HUMANA MEDICARE REPLACEMENT HU[employer]NA MEDICARE REPLACEMENT [id number]       Payor Plan Address Payor Plan Phone Number Payor Plan Fax Number Effective Dates    [address] [address] [phone]  1/1/2018 - None Entered    [facility] [id number]         Subscriber Name Subscriber Birth Date Member ID       JOSE BOURGEOIS 1950 [id number]                     Emergency Contacts        (Rel.) Home Phone Work Phone Mobile Phone    Lena Alvarado (Spouse) [phone] -- [phone]    woodrow danielle (Friend) -- -- [phone]                "

## 2023-07-31 NOTE — THERAPY EVALUATION
Acute Care - Speech Language Pathology   Swallow Initial Evaluation [facility]     Patient Name: Jose Hurtado  : 1950  MRN: [id number]  Today's Date: 2023               Admit Date: 2023    Visit Dx:     ICD-10-CM ICD-9-CM   1. Colitis  K52.9 558.9   2. Proctitis  K62.89 569.49   3. Bladder distention  N32.89 596.89   4. Nausea and vomiting, unspecified vomiting type  R11.2 787.01   5. History of esophagectomy  Z98.890 V15.29    Z90.49      Patient Active Problem List   Diagnosis    History of esophageal cancer    Adenomatous polyp of colon    Malignant neoplasm of prostate    RLS (restless legs syndrome)    History of DVT (deep vein thrombosis)    Recurrent major depressive disorder, in partial remission    Hypertension    Anemia    Insomnia due to other mental disorder    Peripheral polyneuropathy    B12 deficiency    Lymphedema    Iron deficiency    Gastroparesis    History of recurrent deep vein thrombosis (DVT)    Tremor of both hands    Gastrointestinal hemorrhage    Acute blood loss anemia    Pancytopenia    Chronic venous hypertension due to DVT    Bleeding hemorrhoid    Malabsorption of iron    Iron deficiency anemia    History of esophageal cancer    Abnormal CT scan    Generalized weakness    Chronic anticoagulation    CKD (chronic kidney disease) stage 2, GFR 60-89 ml/min    Dysphagia    NSTEMI (non-ST elevated myocardial infarction)    Bronchitis    Dyspnea    Elevated troponin    Atrial fibrillation    Chronic obstructive pulmonary disease    Congestive heart failure    Gait instability    Dark stools    Proctitis    Nausea and vomiting    Acute urinary retention     Past Medical History:   Diagnosis Date    Acute deep vein thrombosis (DVT) of popliteal vein of left lower extremity 2020    Anemia     Anti-phospholipid syndrome     On lifelong anticoagulation therapy    Bladder disorder     LEAKAGE   ON MED  wers pads    Bleeding hemorrhoids     Chronic kidney disease      Community acquired pneumonia     HISTORY OF IN 2014    Congestive heart failure     COPD (chronic obstructive pulmonary disease)     Deep venous thrombosis 2006, 2008    Left lower extremity multiple    Depression     Esophageal carcinoma 12/31/2014    had chemo and radiation prior surgery    Hemorrhoids     HH (hiatus hernia)     History of atrial fibrillation 2015    ONE EPISODE WHILE HOSPITALIZED    History of kidney stones     History of nephrolithiasis     History of pancreatitis     PT STATES MANY YEARS AGO    History of radiation therapy     History of transfusion     Hypertension     Long-term (current) use of anticoagulants, INR goal 2.0-3.0     Lymphedema     Malignant neoplasm of prostate     Other hyperlipidemia 01/30/2018 January 30, 2018 lipid panel risk 12.8%    Restless legs syndrome     Sleep apnea     OCCASIONALLY WEARS CPAP    Squamous carcinoma     on the head     Past Surgical History:   Procedure Laterality Date    APPENDECTOMY  1950    BRONCHOSCOPY      (Diagnostic)    CARDIAC CATHETERIZATION N/A 5/14/2022    Procedure: Left Heart Cath;  Surgeon: Eric So MD;  Location:  [facility];  Service: Cardiology;  Laterality: N/A;    CARDIAC CATHETERIZATION N/A 5/14/2022    Procedure: Coronary angiography;  Surgeon: Eric So MD;  Location:  [facility];  Service: Cardiology;  Laterality: N/A;    CARDIAC CATHETERIZATION N/A 5/14/2022    Procedure: Left ventriculography;  Surgeon: Eric So MD;  Location:  [facility];  Service: Cardiology;  Laterality: N/A;    CATARACT EXTRACTION Bilateral 2014    COLONOSCOPY  12/15/2014    Complete / Description: EH, IH, torts, stool, follow-up colonoscopy due in 5 years.    COLONOSCOPY N/A 6/13/2017    non-thrombosed external hemorrhoids, normal examined ileum, IH    COLONOSCOPY N/A 2/26/2019    Procedure: COLONOSCOPY with Cold Polypectomy;  Surgeon: Mulugeta Millan,  MD;  Location: [facility] ENDOSCOPY;  Service: Gastroenterology    COLONOSCOPY N/A 12/16/2020    Procedure: COLONOSCOPY to cecum into TI;  Surgeon: Mesha Sanchez MD;  Location: [facility] ENDOSCOPY;  Service: Gastroenterology;  Laterality: N/A;  pre: lower GI bleed  post: hemorrhoids     CYSTOSCOPY W/ LASER LITHOTRIPSY      ENDOSCOPY N/A 6/13/2017    Procedure: ESOPHAGOGASTRODUODENOSCOPY WITH COLD BIOPSY;  Surgeon: Lake Gonzalez MD;  Location: [facility] ENDOSCOPY;  Service:     ENDOSCOPY N/A 11/18/2021    Procedure: ESOPHAGOGASTRODUODENOSCOPY WITH  DILATATION WITH FLUOROSCOPY;  Surgeon: Jose Christine III, MD;  Location: [facility] ENDOSCOPY;  Service: Gastroenterology;  Laterality: N/A;  Pre: dysphagia, h/x of adinocarcinoma of esophagus  Post: same    ESOPHAGECTOMY      April 2015, stage IIB esophageal carcinoma, sub-total resection.    ESOPHAGECTOMY      Esophagectomy Subtotal [place] Procedure    EXCISION LESION  08/2012    Removal of Squamous Cell CA on Head    HAMMER TOE REPAIR  09/2014    Hammertoe Operation (Each Toe), 10/2014    HAMMER TOE REPAIR Left 10/3/2017    Procedure: Left second third and fourth distal interphalangeal joint resection with flexor tenotomy;  Surgeon: Mulugeta Lira MD;  Location: [facility] OR OSC;  Service:     HEMORRHOIDECTOMY N/A 12/23/2020    Procedure: HEMORRHOIDECTOMY;  Surgeon: Billy Julio Jr., MD;  Location: [facility] MAIN OR;  Service: General;  Laterality: N/A;    HERNIA REPAIR      incisional    JEJUNOSTOMY      Laparoscopic    JEJUNOSTOMY      tube removal     KNEE SURGERY Bilateral 1967, 1973, 1981    PATELLA SURGERY Left     removed    PILONIDAL CYST / SINUS EXCISION      AL ARTHRP KNE CONDYLE&PLATU MEDIAL&LAT COMPARTMENTS Right 3/26/2018    Procedure: TOTAL KNEE ARTHROPLASTY;  Surgeon: Renny Solis MD;  Location: [facility] MAIN OR;  Service: Orthopedics    PROSTATECTOMY  2010    PYLOROPLASTY      SPINAL FUSION  02/1998    C 5,6    TONSILLECTOMY      UPPER  GASTROINTESTINAL ENDOSCOPY  12/15/2014    LA Grade D esophagitis, Pardo's, HH, multiple duodenal ulcers    VENTRAL/INCISIONAL HERNIA REPAIR N/A 4/14/2016    Procedure: VENTRAL/INCISIONAL HERNIA REPAIR, open, with mesh, and component separation;  Surgeon: Darren Rivas MD;  Location: [facility] OR;  Service:        SLP Recommendation and Plan  SLP Swallowing Diagnosis: suspected pharyngeal dysphagia (07/31/23 0800)  SLP Diet Recommendation: soft to chew textures, whole, thin liquids (07/31/23 0800)  Recommended Precautions and Strategies: upright posture during/after eating, small bites of food and sips of liquid, multiple swallows per bite of food, multiple swallows per sip of liquid, alternate between small bites of food and sips of liquid, other (see comments) (slow rate) (07/31/23 0800)  SLP Rec. for Method of Medication Administration: meds whole, with thin liquids, with puree, as tolerated (07/31/23 0800)     Monitor for Signs of Aspiration: yes, notify SLP if any concerns (07/31/23 0800)  Recommended Diagnostics: reassess via clinical swallow evaluation (07/31/23 0800)  Swallow Criteria for Skilled Therapeutic Interventions Met: demonstrates skilled criteria (07/31/23 0800)     Rehab Potential/Prognosis, Swallowing: good, to achieve stated therapy goals (07/31/23 0800)  Therapy Frequency (Swallow): PRN (07/31/23 0800)  Predicted Duration Therapy Intervention (Days): until discharge (07/31/23 0800)  Oral Care Recommendations: Oral Care BID/PRN (07/31/23 0800)                                      Oral Care Recommendations: Oral Care BID/PRN (07/31/23 0800)    Outcome Evaluation: Clinical swallow evaluation completed. Patient with history of esophageal cancer and esophagectomy. Patient known to  outpatient services with April 2023 discharge indicating noncompliance with home exercise program. Previous VFSS 3/2023 recommended soft/whole diet with thin liquids. This date, patient with no overt s/s  "of aspiration with ice chips, thins by spoon/cup/straw, puree, soft/chopped solids, or mixed consistency. Recommend patient initiate soft/whole diet with thin liquids. Meds whole with thins or puree, as tolerated. Sitting upright, slow rate, small bites/sips, alternate solids/liquids. Patient expressed interest in swallow exercises to strengthen swallow; clinician reminded patient of exercises and EMST device provided to patient during outpatient services. Speech to follow for diet tolerance.      SWALLOW EVALUATION (last 72 hours)       SLP Adult Swallow Evaluation       Row Name 07/31/23 0800                   Rehab Evaluation    Document Type evaluation  -CR        Subjective Information no complaints  -CR        Patient Observations alert;cooperative  -CR        Patient Effort good  -CR        Symptoms Noted During/After Treatment none  -CR           General Information    Patient Profile Reviewed yes  -CR        Pertinent History Of Current Problem 72 y/o male admitted with abdominal pain. CT abdomen and pelvis revelaed \"urinary retention with bilateral mild hydronephrosis and rectal wall thickening with surrounding stranding consistent with focal colitis and proctitis.\" CXR showed no focal pulmonary consolidation and minimal right pleural effusion. H/o esophageal cancer and esophagectomy. Previous VFSS 3/2023 indicated mild-mod oropharyngeal dysphagia and recommended soft/whole diet with thin liquids. Patient discharged from  outpatient services which indicated patient was noncompliant with home exercise program.  -CR        Current Method of Nutrition regular textures;thin liquids  -CR        Precautions/Limitations, Vision WFL with corrective lenses  -CR        Precautions/Limitations, Hearing hearing impairment, bilaterally  -CR        Prior Level of Function-Swallowing regular textures;thin liquids;other (see comments)  Previous VFSS recommended soft/whole diet with thin liquids, however, this date, " patient reports consuming regular solids.  -CR        Plans/Goals Discussed with patient;agreed upon  -CR        Barriers to Rehab medically complex  -CR           Pain    Additional Documentation Pain Scale: Numbers Pre/Post-Treatment (Group)  -CR           Pain Scale: Numbers Pre/Post-Treatment    Pretreatment Pain Rating 0/10 - no pain  -CR        Posttreatment Pain Rating 0/10 - no pain  -CR           Oral Motor Structure and Function    Dentition Assessment upper dentures/partial in place  -CR        Secretion Management WNL/WFL  -CR        Mucosal Quality moist, healthy  -CR           Oral Musculature and Cranial Nerve Assessment    Oral Motor General Assessment WFL  -CR           General Eating/Swallowing Observations    Respiratory Support Currently in Use room air  -CR           Clinical Swallow Eval    Clinical Swallow Evaluation Summary Clinical swallow evaluation completed. Patient with history of esophageal cancer and esophagectomy. Patient known to  outpatient services with April 2023 discharge indicating noncompliance with home exercise program. Previous VFSS 3/2023 recommended soft/whole diet with thin liquids. This date, patient with no overt s/s of aspiration with ice chips, thins by spoon/cup/straw, puree, soft/chopped solids, or mixed consistency. No oral residue or vocal change appreciated. Adequate laryngeal elevation upon palpation. Recommend patient initiate soft/whole diet with thin liquids. Meds whole with thins or puree, as tolerated. Sitting upright, slow rate, small bites/sips, alternate solids/liquids. Patient expressed interest in swallow exercises to strengthen swallow; clinician reminded patient of exercises and EMST device provided to patient during outpatient services. Speech to follow to re-discuss previously recommended home exercise program.  -CR           SLP Evaluation Clinical Impression    SLP Swallowing Diagnosis suspected pharyngeal dysphagia  -CR        Functional  Impact risk of aspiration/pneumonia  -CR        Rehab Potential/Prognosis, Swallowing good, to achieve stated therapy goals  -CR        Swallow Criteria for Skilled Therapeutic Interventions Met demonstrates skilled criteria  -CR           Recommendations    Therapy Frequency (Swallow) PRN  -CR        Predicted Duration Therapy Intervention (Days) until discharge  -CR        SLP Diet Recommendation soft to chew textures;whole;thin liquids  -CR        Recommended Diagnostics reassess via clinical swallow evaluation  -CR        Recommended Precautions and Strategies upright posture during/after eating;small bites of food and sips of liquid;multiple swallows per bite of food;multiple swallows per sip of liquid;alternate between small bites of food and sips of liquid;other (see comments)  slow rate  -CR        Oral Care Recommendations Oral Care BID/PRN  -CR        SLP Rec. for Method of Medication Administration meds whole;with thin liquids;with puree;as tolerated  -CR        Monitor for Signs of Aspiration yes;notify SLP if any concerns  -CR           Swallow Goals (SLP)    Swallow STGs diet tolerance goal selection (SLP)  -CR        Diet Tolerance Goal Selection (SLP) Patient will tolerate trials of  -CR           (STG) Patient will tolerate trials of    Consistencies Trialed (Tolerate trials) soft to chew (whole) textures;thin liquids  -CR                  User Key  (r) = Recorded By, (t) = Taken By, (c) = Cosigned By      Initials Name Effective Dates    CR Myranda Costello CF-SLP 05/31/23 -                     EDUCATION  The patient has been educated in the following areas:   Dysphagia (Swallowing Impairment).        SLP GOALS       Row Name 07/31/23 0800             (STG) Patient will tolerate trials of    Consistencies Trialed (Tolerate trials) soft to chew (whole) textures;thin liquids  -CR                User Key  (r) = Recorded By, (t) = Taken By, (c) = Cosigned By      Initials Name Provider Type    CR  Myranda Costello CF-SLP Speech and Language Pathologist                       Time Calculation:    Time Calculation- SLP       Row Name 07/31/23 0911             Time Calculation- SLP    SLP Start Time 0800  -CR      SLP Received On 07/31/23  -CR                User Key  (r) = Recorded By, (t) = Taken By, (c) = Cosigned By      Initials Name Provider Type    Myranda Reyes CF-SLP Speech and Language Pathologist                    Therapy Charges for Today       Code Description Service Date Service Provider Modifiers Qty    [id number]  ST EVAL ORAL PHARYNG SWALLOW 3 7/31/2023 Myranda Costello CF-SLP GN 1                 RAUL Daniels  7/31/2023

## 2023-07-31 NOTE — CASE MANAGEMENT/SOCIAL WORK
Discharge Planning Assessment  [facility]     Patient Name: Jose Hurtado  MRN: [id number]  Today's Date: 7/31/2023    Admit Date: 7/29/2023    Plan: Home with family to transport   Discharge Needs Assessment       Row Name 07/31/23 1453       Living Environment    People in Home spouse    Current Living Arrangements home    Potentially Unsafe Housing Conditions none    Primary Care Provided by self    Provides Primary Care For no one    Family Caregiver if Needed spouse    Family Caregiver Names Lena Alvarado [phone]    Quality of Family Relationships helpful;involved;supportive    Able to Return to Prior Arrangements yes       Resource/Environmental Concerns    Resource/Environmental Concerns none    Transportation Concerns none       Transition Planning    Patient/Family Anticipates Transition to home with family    Patient/Family Anticipated Services at Transition none    Transportation Anticipated family or friend will provide       Discharge Needs Assessment    Readmission Within the Last 30 Days no previous admission in last 30 days    Equipment Currently Used at Home cpap    Concerns to be Addressed denies needs/concerns at this time;discharge planning    Anticipated Changes Related to Illness none    Equipment Needed After Discharge none                   Discharge Plan       Row Name 07/31/23 1454       Plan    Plan Home with family to transport    Patient/Family in Agreement with Plan yes    Plan Comments Spoke with patient and spouse/ Lena at bedside. Introduced self and explained CCP role. Verified face sheet and local pharmacy is [facility]. Patient denies difficulty with medication cost/ management. Patient lives in patio home with spouse, no [place]. Patient has used [facility] in the past and would use again if needed. Patient has been to [facility] more than 5 years ago, and if SNF is needed would like new list. Patient states that he has a cane, rolling walker, grab bars, and Cpap through [employer].  Patient is IADL and drives. Patient plans to return home with spouse to transport. Denies further needs at this time.                  Continued Care and Services - Admitted Since 7/29/2023    Coordination has not been started for this encounter.          Demographic Summary       Row Name 07/31/23 1449       General Information    Admission Type inpatient    Required Notices Provided Important Message from Medicare    Referral Source admission list    Reason for Consult discharge planning    Preferred Language English                   Functional Status       Row Name 07/31/23 1452       Functional Status    Usual Activity Tolerance good    Current Activity Tolerance good       Functional Status, IADL    Medications independent    Meal Preparation independent    Housekeeping independent    Laundry independent    Shopping independent       Mental Status    General Appearance WDL WDL       Mental Status Summary    Recent Changes in Mental Status/Cognitive Functioning no changes       Employment/    Employment Status retired                   Psychosocial    No documentation.                  Abuse/Neglect    No documentation.                  Legal    No documentation.                  Substance Abuse    No documentation.                  Patient Forms    No documentation.                     Jannette Campbell RN

## 2023-07-31 NOTE — THERAPY TREATMENT NOTE
Patient Name: Jose Hurtado  : 1950    MRN: [id number]                              Today's Date: 2023       Admit Date: 2023    Visit Dx:     ICD-10-CM ICD-9-CM   1. Colitis  K52.9 558.9   2. Proctitis  K62.89 569.49   3. Bladder distention  N32.89 596.89   4. Nausea and vomiting, unspecified vomiting type  R11.2 787.01   5. History of esophagectomy  Z98.890 V15.29    Z90.49      Patient Active Problem List   Diagnosis    History of esophageal cancer    Adenomatous polyp of colon    Malignant neoplasm of prostate    RLS (restless legs syndrome)    History of DVT (deep vein thrombosis)    Recurrent major depressive disorder, in partial remission    Hypertension    Anemia    Insomnia due to other mental disorder    Peripheral polyneuropathy    B12 deficiency    Lymphedema    Iron deficiency    Gastroparesis    History of recurrent deep vein thrombosis (DVT)    Tremor of both hands    Gastrointestinal hemorrhage    Acute blood loss anemia    Pancytopenia    Chronic venous hypertension due to DVT    Bleeding hemorrhoid    Malabsorption of iron    Iron deficiency anemia    History of esophageal cancer    Abnormal CT scan    Generalized weakness    Chronic anticoagulation    CKD (chronic kidney disease) stage 2, GFR 60-89 ml/min    Dysphagia    NSTEMI (non-ST elevated myocardial infarction)    Bronchitis    Dyspnea    Elevated troponin    Atrial fibrillation    Chronic obstructive pulmonary disease    Congestive heart failure    Gait instability    Dark stools    Proctitis    Nausea and vomiting    Acute urinary retention     Past Medical History:   Diagnosis Date    Acute deep vein thrombosis (DVT) of popliteal vein of left lower extremity 2020    Anemia     Anti-phospholipid syndrome     On lifelong anticoagulation therapy    Bladder disorder     LEAKAGE   ON MED  wers pads    Bleeding hemorrhoids     Chronic kidney disease     Community acquired pneumonia     HISTORY OF IN     Congestive  heart failure     COPD (chronic obstructive pulmonary disease)     Deep venous thrombosis 2006, 2008    Left lower extremity multiple    Depression     Esophageal carcinoma 12/31/2014    had chemo and radiation prior surgery    Hemorrhoids     HH (hiatus hernia)     History of atrial fibrillation 2015    ONE EPISODE WHILE HOSPITALIZED    History of kidney stones     History of nephrolithiasis     History of pancreatitis     PT STATES MANY YEARS AGO    History of radiation therapy     History of transfusion     Hypertension     Long-term (current) use of anticoagulants, INR goal 2.0-3.0     Lymphedema     Malignant neoplasm of prostate     Other hyperlipidemia 01/30/2018 January 30, 2018 lipid panel risk 12.8%    Restless legs syndrome     Sleep apnea     OCCASIONALLY WEARS CPAP    Squamous carcinoma     on the head     Past Surgical History:   Procedure Laterality Date    APPENDECTOMY  1950    BRONCHOSCOPY      (Diagnostic)    CARDIAC CATHETERIZATION N/A 5/14/2022    Procedure: Left Heart Cath;  Surgeon: Eric So MD;  Location: [facility] CATH INVASIVE LOCATION;  Service: Cardiology;  Laterality: N/A;    CARDIAC CATHETERIZATION N/A 5/14/2022    Procedure: Coronary angiography;  Surgeon: Eric So MD;  [facility];  Service: Cardiology;  Laterality: N/A;    CARDIAC CATHETERIZATION N/A 5/14/2022    Procedure: Left ventriculography;  Surgeon: Eric So MD;  Location:  [facility] CATH INVASIVE LOCATION;  Service: Cardiology;  Laterality: N/A;    CATARACT EXTRACTION Bilateral 2014    COLONOSCOPY  12/15/2014    Complete / Description: EH, IH, torts, stool, follow-up colonoscopy due in 5 years.    COLONOSCOPY N/A 6/13/2017    non-thrombosed external hemorrhoids, normal examined ileum, IH    COLONOSCOPY N/A 2/26/2019    Procedure: COLONOSCOPY with Cold Polypectomy;  Surgeon: Mulugeta Millan MD;  Location: [facility] ENDOSCOPY;  Service: Gastroenterology     COLONOSCOPY N/A 12/16/2020    Procedure: COLONOSCOPY to cecum into TI;  Surgeon: Mesha Sanchez MD;  Location:  [facility] ENDOSCOPY;  Service: Gastroenterology;  Laterality: N/A;  pre: lower GI bleed  post: hemorrhoids     CYSTOSCOPY W/ LASER LITHOTRIPSY      ENDOSCOPY N/A 6/13/2017    Procedure: ESOPHAGOGASTRODUODENOSCOPY WITH COLD BIOPSY;  Surgeon: Lake Gonzalez MD;  [facility];  Service:     ENDOSCOPY N/A 11/18/2021    Procedure: ESOPHAGOGASTRODUODENOSCOPY WITH  DILATATION WITH FLUOROSCOPY;  Surgeon: Jose Christine III, MD;  [facility];  Service: Gastroenterology;  Laterality: N/A;  Pre: dysphagia, h/x of adinocarcinoma of esophagus  Post: same    ESOPHAGECTOMY      April 2015, stage IIB esophageal carcinoma, sub-total resection.    ESOPHAGECTOMY      Esophagectomy Subtotal [place] Procedure    EXCISION LESION  08/2012    Removal of Squamous Cell CA on Head    HAMMER TOE REPAIR  09/2014    Hammertoe Operation (Each Toe), 10/2014    HAMMER TOE REPAIR Left 10/3/2017    Procedure: Left second third and fourth distal interphalangeal joint resection with flexor tenotomy;  Surgeon: Mulugeta Lira MD;  Location:  [facility] OR [facility];  Service:     HEMORRHOIDECTOMY N/A 12/23/2020    Procedure: HEMORRHOIDECTOMY;  Surgeon: Billy Julio Jr., MD;  Location: [facility] MAIN OR;  Service: General;  Laterality: N/A;    HERNIA REPAIR      incisional    JEJUNOSTOMY      Laparoscopic    JEJUNOSTOMY      tube removal     KNEE SURGERY Bilateral 1967, 1973, 1981    PATELLA SURGERY Left     removed    PILONIDAL CYST / SINUS EXCISION      NE ARTHRP KNE CONDYLE&PLATU MEDIAL&LAT COMPARTMENTS Right 3/26/2018    Procedure: TOTAL KNEE ARTHROPLASTY;  Surgeon: Renny Solis MD;  Location: [facility] MAIN OR;  Service: Orthopedics    PROSTATECTOMY  2010    PYLOROPLASTY      SPINAL FUSION  02/1998    C 5,6    TONSILLECTOMY      UPPER GASTROINTESTINAL ENDOSCOPY  12/15/2014    LA Grade D esophagitis,  Pardo's, HH, multiple duodenal ulcers    VENTRAL/INCISIONAL HERNIA REPAIR N/A 4/14/2016    Procedure: VENTRAL/INCISIONAL HERNIA REPAIR, open, with mesh, and component separation;  Surgeon: Darren Rivas MD;  Location: [facility] OR;  Service:       General Information       Row Name 07/31/23 1150          Physical Therapy Time and Intention    Document Type therapy note (daily note)  -CS     Mode of Treatment individual therapy;physical therapy  -CS       Row Name 07/31/23 1150          General Information    Patient Profile Reviewed yes  -CS     Existing Precautions/Restrictions fall  -CS       Row Name 07/31/23 1150          Cognition    Orientation Status (Cognition) oriented x 3  -CS       Row Name 07/31/23 1150          Safety Issues, Functional Mobility    Impairments Affecting Function (Mobility) postural/trunk control;strength;endurance/activity tolerance  -CS               User Key  (r) = Recorded By, (t) = Taken By, (c) = Cosigned By      Initials Name Provider Type    CS Saumya Bonner, PT Physical Therapist                   Mobility       Row Name 07/31/23 1150          Bed Mobility    Bed Mobility supine-sit  -CS     Supine-Sit Butts (Bed Mobility) standby assist  -CS       Row Name 07/31/23 1150          Sit-Stand Transfer    Sit-Stand Butts (Transfers) standby assist  -CS     Assistive Device (Sit-Stand Transfers) walker, front-wheeled  -CS     Comment, (Sit-Stand Transfer) shoes donned prior to mobility; bed elevated to assist  -CS       Row Name 07/31/23 1150          Gait/Stairs (Locomotion)    Butts Level (Gait) standby assist;contact guard;verbal cues  -CS     Assistive Device (Gait) walker, front-wheeled  -CS     Distance in Feet (Gait) 400'  -CS     Deviations/Abnormal Patterns (Gait) jf decreased;gait speed decreased;base of support, narrow  -CS     Bilateral Gait Deviations forward flexed posture;heel strike decreased  -CS     Left Sided Gait  Deviations decreased knee extension  -CS     Right Sided Gait Deviations decreased knee extension  -CS     [place] Level (Stairs) not tested  -CS     Comment, (Gait/Stairs) slow pace; VC for upright posture & B knee extension during stance phase  -CS               User Key  (r) = Recorded By, (t) = Taken By, (c) = Cosigned By      Initials Name Provider Type    CS Saumya Bonner, PT Physical Therapist                   Obj/Interventions       Row Name 07/31/23 1152          Balance    Balance Assessment sitting static balance;sitting dynamic balance;standing static balance;standing dynamic balance  -CS     Static Sitting Balance standby assist  -CS     Dynamic Sitting Balance standby assist  -CS     Position, Sitting Balance unsupported;sitting edge of bed  -CS     Static Standing Balance standby assist  -CS     Dynamic Standing Balance contact guard  -CS     Position/Device Used, Standing Balance supported;walker, front-wheeled  -CS               User Key  (r) = Recorded By, (t) = Taken By, (c) = Cosigned By      Initials Name Provider Type    CS Saumya Bonner, PT Physical Therapist                   Goals/Plan    No documentation.                  Clinical Impression       Row Name 07/31/23 1152          Pain    Pretreatment Pain Rating 0/10 - no pain  -CS     Posttreatment Pain Rating 0/10 - no pain  -CS       Row Name 07/31/23 1152          Plan of Care Review    Plan of Care Reviewed With patient  -CS     Progress improving  -     Outcome Evaluation Pt received in bed upon arrival and agreeable to PT. Pt completed supine to sit, STS transfer, and ambulated 400' c RW requiring SBA/CGA. Pt demo's a slow pace, narrow NOEL, forward flexed posture, and sustained B knee flexion. VC provided throughout mobility to correct deficits. Pt improving as demonstrated with increased activity tolerance. Pt sitting EOB awaiting aide to ambulate before lunch. Pt will continue to benefit from skilled PT to address  strength and functional mobility.  -CS       Row Name 07/31/23 1152          Therapy Assessment/Plan (PT)    Criteria for Skilled Interventions Met (PT) yes;meets criteria  -CS     Therapy Frequency (PT) 5 times/wk  -CS       Row Name 07/31/23 1152          Positioning and Restraints    Pre-Treatment Position in bed  -CS     Post Treatment Position bed  -CS     In Bed notified nsg;sitting EOB;call light within reach;encouraged to call for assist  -CS               User Key  (r) = Recorded By, (t) = Taken By, (c) = Cosigned By      Initials Name Provider Type    Saumya Chacko, PT Physical Therapist                   Outcome Measures       Row Name 07/31/23 1155          How much help from another person do you currently need...    Turning from your back to your side while in flat bed without using bedrails? 4  -CS     Moving from lying on back to sitting on the side of a flat bed without bedrails? 4  -CS     Moving to and from a bed to a chair (including a wheelchair)? 4  -CS     Standing up from a chair using your arms (e.g., wheelchair, bedside chair)? 4  -CS     Climbing 3-5 steps with a railing? 3  -CS     To walk in hospital room? 4  -CS     AM-PAC 6 Clicks Score (PT) 23  -CS     Highest level of mobility 7 --> Walked 25 feet or more  -CS       Row Name 07/31/23 1158          Functional Assessment    Outcome Measure Options AM-PAC 6 Clicks Basic Mobility (PT)  -CS               User Key  (r) = Recorded By, (t) = Taken By, (c) = Cosigned By      Initials Name Provider Type    Saumya Chacko, MANDA Physical Therapist                                 Physical Therapy Education       Title: PT OT SLP Therapies (Done)       Topic: Physical Therapy (Done)       Point: Mobility training (Done)       Learning Progress Summary             Patient Acceptance, E,TB, ZAN,DU by CS at 7/31/2023 1155    Acceptance, E, VU by  at 7/30/2023 1427    Acceptance, E,D,TB, VU,DU,NR by  at 7/30/2023 1225                          Point: Home exercise program (Done)       Learning Progress Summary             Patient Acceptance, E,TB, VU,DU by  at 7/31/2023 1155                         Point: Body mechanics (Done)       Learning Progress Summary             Patient Acceptance, E,TB, VU,DU by  at 7/31/2023 1155                         Point: Precautions (Done)       Learning Progress Summary             Patient Acceptance, E,TB, VU,DU by  at 7/31/2023 1155                                         User Key       Initials Effective Dates Name Provider Type Discipline     08/04/22 -  Rachel Moncada, RN Registered Nurse Nurse     05/26/20 -  Anabella Gallegos, MANDA Physical Therapist PT     09/22/22 -  Saumya Bonner, PT Physical Therapist PT                  PT Recommendation and Plan     Plan of Care Reviewed With: patient  Progress: improving  Outcome Evaluation: Pt received in bed upon arrival and agreeable to PT. Pt completed supine to sit, STS transfer, and ambulated 400' c RW requiring SBA/CGA. Pt demo's a slow pace, narrow NOEL, forward flexed posture, and sustained B knee flexion. VC provided throughout mobility to correct deficits. Pt improving as demonstrated with increased activity tolerance. Pt sitting EOB awaiting aide to ambulate before lunch. Pt will continue to benefit from skilled PT to address strength and functional mobility.     Time Calculation:         PT Charges       Row Name 07/31/23 1155             Time Calculation    Start Time 1121  -CS      Stop Time 1137  -CS      Time Calculation (min) 16 min  -CS      PT Received On 07/31/23  -      PT - Next Appointment 08/01/23  -         Time Calculation- PT    Total Timed Code Minutes- PT 14 minute(s)  -CS         Timed Charges    96321 - PT Therapeutic Activity Minutes 14  -CS         Total Minutes    Timed Charges Total Minutes 14  -CS       Total Minutes 14  -CS                User Key  (r) = Recorded By, (t) = Taken By, (c) = Cosigned By      Initials Name  Provider Type    CS Saumya Bonner, PT Physical Therapist                  Therapy Charges for Today       Code Description Service Date Service Provider Modifiers Qty    [id number] HC PT THERAPEUTIC ACT EA 15 MIN 7/31/2023 Saumya Bonner, PT GP 1            PT G-Codes  Outcome Measure Options: AM-PAC 6 Clicks Basic Mobility (PT)  AM-PAC 6 Clicks Score (PT): 23  PT Discharge Summary  Anticipated Discharge Disposition (PT): home with home health, home with assist    Saumya Bonner PT  7/31/2023

## 2023-08-01 PROCEDURE — 94664 DEMO&/EVAL PT USE INHALER: CPT

## 2023-08-01 PROCEDURE — 94799 UNLISTED PULMONARY SVC/PX: CPT

## 2023-08-01 PROCEDURE — 25010000002 SODIUM CHLORIDE 0.9 % WITH KCL 20 MEQ 20-0.9 MEQ/L-% SOLUTION: Performed by: INTERNAL MEDICINE

## 2023-08-01 PROCEDURE — 96361 HYDRATE IV INFUSION ADD-ON: CPT

## 2023-08-01 PROCEDURE — 25010000002 METRONIDAZOLE 500 MG/100ML SOLUTION: Performed by: INTERNAL MEDICINE

## 2023-08-01 PROCEDURE — 25010000002 CEFTRIAXONE PER 250 MG: Performed by: INTERNAL MEDICINE

## 2023-08-01 PROCEDURE — 99232 SBSQ HOSP IP/OBS MODERATE 35: CPT | Performed by: NURSE PRACTITIONER

## 2023-08-01 RX ORDER — POLYETHYLENE GLYCOL 3350 17 G/17G
34 POWDER, FOR SOLUTION ORAL DAILY
Status: COMPLETED | OUTPATIENT
Start: 2023-08-01 | End: 2023-08-01

## 2023-08-01 RX ADMIN — TAMSULOSIN HYDROCHLORIDE 0.4 MG: 0.4 CAPSULE ORAL at 09:19

## 2023-08-01 RX ADMIN — METRONIDAZOLE 500 MG: 500 INJECTION, SOLUTION INTRAVENOUS at 09:18

## 2023-08-01 RX ADMIN — POLYETHYLENE GLYCOL 3350 34 G: 17 POWDER, FOR SOLUTION ORAL at 18:45

## 2023-08-01 RX ADMIN — SENNOSIDES AND DOCUSATE SODIUM 2 TABLET: 50; 8.6 TABLET ORAL at 09:19

## 2023-08-01 RX ADMIN — BISACODYL 5 MG: 5 TABLET ORAL at 09:21

## 2023-08-01 RX ADMIN — METRONIDAZOLE 500 MG: 500 INJECTION, SOLUTION INTRAVENOUS at 00:09

## 2023-08-01 RX ADMIN — APIXABAN 5 MG: 5 TABLET, FILM COATED ORAL at 21:49

## 2023-08-01 RX ADMIN — SENNOSIDES AND DOCUSATE SODIUM 2 TABLET: 50; 8.6 TABLET ORAL at 21:49

## 2023-08-01 RX ADMIN — TIOTROPIUM BROMIDE INHALATION SPRAY 2 PUFF: 3.12 SPRAY, METERED RESPIRATORY (INHALATION) at 06:56

## 2023-08-01 RX ADMIN — PAROXETINE HYDROCHLORIDE HEMIHYDRATE 40 MG: 20 TABLET, FILM COATED ORAL at 07:32

## 2023-08-01 RX ADMIN — METRONIDAZOLE 500 MG: 500 INJECTION, SOLUTION INTRAVENOUS at 15:23

## 2023-08-01 RX ADMIN — PANTOPRAZOLE SODIUM 40 MG: 40 TABLET, DELAYED RELEASE ORAL at 18:43

## 2023-08-01 RX ADMIN — APIXABAN 5 MG: 5 TABLET, FILM COATED ORAL at 07:32

## 2023-08-01 RX ADMIN — POLYETHYLENE GLYCOL 3350 17 G: 17 POWDER, FOR SOLUTION ORAL at 09:17

## 2023-08-01 RX ADMIN — Medication 10 ML: at 09:21

## 2023-08-01 RX ADMIN — CEFTRIAXONE SODIUM 1000 MG: 1 INJECTION, POWDER, FOR SOLUTION INTRAMUSCULAR; INTRAVENOUS at 23:41

## 2023-08-01 RX ADMIN — Medication 10 ML: at 21:49

## 2023-08-01 RX ADMIN — POTASSIUM CHLORIDE AND SODIUM CHLORIDE 100 ML/HR: 900; 150 INJECTION, SOLUTION INTRAVENOUS at 05:26

## 2023-08-01 RX ADMIN — POTASSIUM CHLORIDE AND SODIUM CHLORIDE 100 ML/HR: 900; 150 INJECTION, SOLUTION INTRAVENOUS at 15:23

## 2023-08-01 RX ADMIN — PANTOPRAZOLE SODIUM 40 MG: 40 TABLET, DELAYED RELEASE ORAL at 07:32

## 2023-08-01 NOTE — PROGRESS NOTES
Gastroenterology   Inpatient Progress Note    Reason for Follow Up: Abnormal imaging rectum, abdominal pain    Subjective  Interval History:   Denies rectal bleeding, bowel movement early am, no abdominal pain, no nausea  Interested in flex sig then discharge  Feels well    Current Facility-Administered Medications:     acetaminophen (TYLENOL) tablet 650 mg, 650 mg, Oral, Q4H PRN **OR** acetaminophen (TYLENOL) 160 MG/5ML solution 650 mg, 650 mg, Oral, Q4H PRN **OR** acetaminophen (TYLENOL) suppository 650 mg, 650 mg, Rectal, Q4H PRN, Jose Osborne MD    albuterol (PROVENTIL) nebulizer solution 0.083% 2.5 mg/3mL, 2.5 mg, Nebulization, Q6H PRN, Jose Osborne MD    apixaban (ELIQUIS) tablet 5 mg, 5 mg, Oral, Q12H, Jose Osborne MD, 5 mg at 08/01/23 0732    sennosides-docusate (PERICOLACE) 8.6-50 MG per tablet 2 tablet, 2 tablet, Oral, BID, 2 tablet at 08/01/23 0919 **AND** polyethylene glycol (MIRALAX) packet 17 g, 17 g, Oral, Daily, 17 g at 08/01/23 0917 **AND** bisacodyl (DULCOLAX) EC tablet 5 mg, 5 mg, Oral, Daily, 5 mg at 08/01/23 0921 **AND** bisacodyl (DULCOLAX) suppository 10 mg, 10 mg, Rectal, Daily PRN, Jameel Valencia MD    Calcium Replacement - Follow Nurse / BPA Driven Protocol, , Does not apply, PRNDylon Jawed, MD    cefTRIAXone (ROCEPHIN) 1,000 mg in sodium chloride 0.9 % 100 mL IVPB-VTB, 1,000 mg, Intravenous, Q24H, Jose Osobrne MD, Last Rate: 200 mL/hr at 07/31/23 2330, 1,000 mg at 07/31/23 2330    Magnesium Standard Dose Replacement - Follow Nurse / BPA Driven Protocol, , Does not apply, PRNDylon Jawed, MD    metoprolol tartrate (LOPRESSOR) tablet 50 mg, 50 mg, Oral, BID, Jose Osbrone MD, 50 mg at 07/30/23 2011    metroNIDAZOLE (FLAGYL) IVPB 500 mg, 500 mg, Intravenous, Q8H, Jose Osborne MD, Last Rate: 100 mL/hr at 08/01/23 1523, 500 mg at 08/01/23 1523    ondansetron (ZOFRAN) injection 4 mg, 4 mg, Intravenous, Q6H PRN, Jose Osborne MD    pantoprazole (PROTONIX) EC tablet 40 mg, 40 mg, Oral,  BID AC, Jose Osborne MD, 40 mg at 08/01/23 0732    PARoxetine (PAXIL) tablet 40 mg, 40 mg, Oral, QAM, Jose Osborne MD, 40 mg at 08/01/23 0732    Phosphorus Replacement - Follow Nurse / BPA Driven Protocol, , Does not apply, PRN, Jose Osborne MD    Potassium Replacement - Follow Nurse / BPA Driven Protocol, , Does not apply, PRN, Jose Osborne MD    sodium chloride 0.9 % flush 10 mL, 10 mL, Intravenous, Q12H, Jose Osborne MD, 10 mL at 08/01/23 0921    sodium chloride 0.9 % flush 10 mL, 10 mL, Intravenous, PRN, Jose Osborne MD    sodium chloride 0.9 % infusion 40 mL, 40 mL, Intravenous, PRN, Jose Osborne MD    sodium chloride 0.9 % with KCl 20 mEq/L infusion, 100 mL/hr, Intravenous, Continuous, Jose Osborne MD, Last Rate: 100 mL/hr at 08/01/23 1523, 100 mL/hr at 08/01/23 1523    tamsulosin (FLOMAX) 24 hr capsule 0.4 mg, 0.4 mg, Oral, Daily, Poncho Doss, APRN, 0.4 mg at 08/01/23 0919    tiotropium (SPIRIVA RESPIMAT) 2.5 mcg/act aerosol solution inhaler, 2 puff, Inhalation, Daily, Jose Osborne MD, 2 puff at 08/01/23 0656  Review of Systems:               The following systems were reviewed and negative;  gastrointestinal    Objective     Vital Signs  Temp:  [97.3 øF (36.3 øC)-98.2 øF (36.8 øC)] 98.2 øF (36.8 øC)  Heart Rate:  [51-58] 56  Resp:  [16-18] 16  BP: (128-140)/(67-82) 138/67  Body mass index is 20.5 kg/mý.                  Physical Exam:              General: patient awake, alert and cooperative              Eyes: no scleral icterus              Skin: warm and dry, not jaundiced              Abdomen: soft, normal bowel sounds, nontender, flat              Psychiatric: Appropriate affect and behavior                Results Review:                I reviewed the patient's new clinical results.    Results from last 7 days   Lab Units 07/30/23  0827 07/29/23  1137 07/27/23  1138   WBC 10*3/mm3 2.58* 3.19*  --    HEMOGLOBIN g/dL 10.6* 11.6* 11.1*   HEMATOCRIT % 31.9* 34.5* 33.8*   PLATELETS 10*3/mm3 157  194  --      Results from last 7 days   Lab Units 07/30/23  0827 07/29/23  1137   SODIUM mmol/L 140 139   POTASSIUM mmol/L 4.2 4.3   CHLORIDE mmol/L 107 103   CO2 mmol/L 24.6 25.0   BUN mg/dL 15 18   CREATININE mg/dL 1.03 1.17   CALCIUM mg/dL 8.4* 9.1   BILIRUBIN mg/dL 0.3 0.4   ALK PHOS U/L 78 109   ALT (SGPT) U/L 13 15   AST (SGOT) U/L 18 20   GLUCOSE mg/dL 84 136*         Lab Results   Lab Value Date/Time    LIPASE 25 07/29/2023 1137    LIPASE 18 08/30/2016 2057       Radiology:  CT Abdomen Pelvis With Contrast   Final Result   1. Rectal wall thickening with surrounding stranding/inflammation   consistent with focal colitis/proctitis.   2. Moderate distention of the urinary bladder. There is also hydroureter   and mild hydronephrosis with distention of the renal pelvises without   evidence for obstructing stone. Small nonobstructing renal stones are   present.   3. Previous esophagectomy with gastric pull-through. Chronic loculated   right basal pleural effusion with surrounding pleural thickening,   similar to prior exam 03/29/2023.       Radiation dose reduction techniques were utilized, including automated   exposure control and exposure modulation based on body size.       This report was finalized on 7/29/2023 1:59 PM by Dr. Andrade Boo M.D.          XR Chest 1 View   Final Result   No focal pulmonary consolidation. Minimal right pleural   effusion.       This report was finalized on 7/29/2023 12:35 PM by Dr. Sumanth Boyer M.D.              Assessment & Plan     Active Hospital Problems    Diagnosis     **Proctitis     Nausea and vomiting     Acute urinary retention     Chronic obstructive pulmonary disease     Atrial fibrillation     CKD (chronic kidney disease) stage 2, GFR 60-89 ml/min     History of esophageal cancer     Iron deficiency     Lymphedema     Peripheral polyneuropathy     Anemia     Hypertension     Malignant neoplasm of prostate        Assessment:  Abdominal pain  resolved  Abnormal CT scan with Rectal wall thickening with surrounding stranding/inflammation  consistent with focal colitis/proctitis.  Hydronephrosis with urinary retention  History of prostate cancer status post radiation 2014  History of esophageal cancer status post esophagectomy  History of bleeding hemorrhoids status post hemorrhoidectomy with Dr. Julio 2020      Plan:  Additional dose of miralax 2  now  Enema x 2 prior to flexible sigmoidoscopy  Continue miralax, colace and senna as scheduled  Patient would like to proceed with flexible sig to evaluate rectal inflammation given his history of malignancy, orders placed    I discussed the patients findings and my recommendations with patient and nursing staff.           Rachel MCKINNEY  [facility], [place], operated by [facility]  [address] [place] [address]

## 2023-08-01 NOTE — PLAN OF CARE
Goal Outcome Evaluation:     A&OX4, calm and cooperative. ABX administered, pt eager about being discharged. [place], clear yellow urine noted. Did report a large BM 7/31. VSS on room air, [place] will cont to monitor throughout remainder of shift.

## 2023-08-02 ENCOUNTER — ANESTHESIA EVENT (OUTPATIENT)
Dept: GASTROENTEROLOGY | Facility: HOSPITAL | Age: 73
End: 2023-08-02
Payer: MEDICARE

## 2023-08-02 ENCOUNTER — ANESTHESIA (OUTPATIENT)
Dept: GASTROENTEROLOGY | Facility: HOSPITAL | Age: 73
End: 2023-08-02
Payer: MEDICARE

## 2023-08-02 PROCEDURE — 63710000001 SENNOSIDES-DOCUSATE 8.6-50 MG TABLET: Performed by: INTERNAL MEDICINE

## 2023-08-02 PROCEDURE — 25010000002 PROPOFOL 10 MG/ML EMULSION: Performed by: ANESTHESIOLOGY

## 2023-08-02 PROCEDURE — 25010000002 CEFTRIAXONE PER 250 MG: Performed by: INTERNAL MEDICINE

## 2023-08-02 PROCEDURE — 63710000001 APIXABAN 5 MG TABLET: Performed by: INTERNAL MEDICINE

## 2023-08-02 PROCEDURE — G0378 HOSPITAL OBSERVATION PER HR: HCPCS

## 2023-08-02 PROCEDURE — A9270 NON-COVERED ITEM OR SERVICE: HCPCS | Performed by: INTERNAL MEDICINE

## 2023-08-02 PROCEDURE — 94799 UNLISTED PULMONARY SVC/PX: CPT

## 2023-08-02 PROCEDURE — 25010000002 METRONIDAZOLE 500 MG/100ML SOLUTION: Performed by: INTERNAL MEDICINE

## 2023-08-02 PROCEDURE — 63710000001 METOPROLOL TARTRATE 50 MG TABLET: Performed by: INTERNAL MEDICINE

## 2023-08-02 PROCEDURE — 45330 DIAGNOSTIC SIGMOIDOSCOPY: CPT | Performed by: INTERNAL MEDICINE

## 2023-08-02 PROCEDURE — 94664 DEMO&/EVAL PT USE INHALER: CPT

## 2023-08-02 PROCEDURE — 25010000002 SODIUM CHLORIDE 0.9 % WITH KCL 20 MEQ 20-0.9 MEQ/L-% SOLUTION: Performed by: INTERNAL MEDICINE

## 2023-08-02 PROCEDURE — 96361 HYDRATE IV INFUSION ADD-ON: CPT

## 2023-08-02 RX ORDER — LIDOCAINE HYDROCHLORIDE 20 MG/ML
INJECTION, SOLUTION INFILTRATION; PERINEURAL AS NEEDED
Status: DISCONTINUED | OUTPATIENT
Start: 2023-08-02 | End: 2023-08-02 | Stop reason: SURG

## 2023-08-02 RX ORDER — SODIUM CHLORIDE 9 MG/ML
30 INJECTION, SOLUTION INTRAVENOUS CONTINUOUS PRN
Status: DISCONTINUED | OUTPATIENT
Start: 2023-08-02 | End: 2023-08-04

## 2023-08-02 RX ORDER — PROPOFOL 10 MG/ML
VIAL (ML) INTRAVENOUS CONTINUOUS PRN
Status: DISCONTINUED | OUTPATIENT
Start: 2023-08-02 | End: 2023-08-02 | Stop reason: SURG

## 2023-08-02 RX ORDER — PROPOFOL 10 MG/ML
VIAL (ML) INTRAVENOUS AS NEEDED
Status: DISCONTINUED | OUTPATIENT
Start: 2023-08-02 | End: 2023-08-02 | Stop reason: SURG

## 2023-08-02 RX ORDER — SODIUM CHLORIDE, SODIUM LACTATE, POTASSIUM CHLORIDE, CALCIUM CHLORIDE 600; 310; 30; 20 MG/100ML; MG/100ML; MG/100ML; MG/100ML
INJECTION, SOLUTION INTRAVENOUS CONTINUOUS PRN
Status: DISCONTINUED | OUTPATIENT
Start: 2023-08-02 | End: 2023-08-02 | Stop reason: SURG

## 2023-08-02 RX ADMIN — SENNOSIDES AND DOCUSATE SODIUM 2 TABLET: 50; 8.6 TABLET ORAL at 22:48

## 2023-08-02 RX ADMIN — METOPROLOL TARTRATE 50 MG: 50 TABLET, FILM COATED ORAL at 22:47

## 2023-08-02 RX ADMIN — TIOTROPIUM BROMIDE INHALATION SPRAY 2 PUFF: 3.12 SPRAY, METERED RESPIRATORY (INHALATION) at 08:44

## 2023-08-02 RX ADMIN — PAROXETINE HYDROCHLORIDE HEMIHYDRATE 40 MG: 20 TABLET, FILM COATED ORAL at 06:35

## 2023-08-02 RX ADMIN — PANTOPRAZOLE SODIUM 40 MG: 40 TABLET, DELAYED RELEASE ORAL at 06:35

## 2023-08-02 RX ADMIN — PROPOFOL 75 MG: 10 INJECTION, EMULSION INTRAVENOUS at 16:33

## 2023-08-02 RX ADMIN — POTASSIUM CHLORIDE AND SODIUM CHLORIDE 100 ML/HR: 900; 150 INJECTION, SOLUTION INTRAVENOUS at 03:45

## 2023-08-02 RX ADMIN — Medication 150 MCG/KG/MIN: at 16:33

## 2023-08-02 RX ADMIN — METRONIDAZOLE 500 MG: 500 INJECTION, SOLUTION INTRAVENOUS at 00:06

## 2023-08-02 RX ADMIN — SODIUM CHLORIDE, POTASSIUM CHLORIDE, SODIUM LACTATE AND CALCIUM CHLORIDE: 600; 310; 30; 20 INJECTION, SOLUTION INTRAVENOUS at 16:29

## 2023-08-02 RX ADMIN — METRONIDAZOLE 500 MG: 500 INJECTION, SOLUTION INTRAVENOUS at 09:00

## 2023-08-02 RX ADMIN — LIDOCAINE HYDROCHLORIDE 30 MG: 20 INJECTION, SOLUTION INFILTRATION; PERINEURAL at 16:29

## 2023-08-02 RX ADMIN — Medication 10 ML: at 22:48

## 2023-08-02 RX ADMIN — Medication 10 ML: at 08:59

## 2023-08-02 RX ADMIN — CEFTRIAXONE SODIUM 1000 MG: 1 INJECTION, POWDER, FOR SOLUTION INTRAMUSCULAR; INTRAVENOUS at 23:55

## 2023-08-02 RX ADMIN — SODIUM CHLORIDE 30 ML/HR: 9 INJECTION, SOLUTION INTRAVENOUS at 14:58

## 2023-08-02 RX ADMIN — APIXABAN 5 MG: 5 TABLET, FILM COATED ORAL at 22:13

## 2023-08-02 NOTE — NURSING NOTE
Tap water enema given times 2 with good results. [place], large amount followed by muddy water. Patient tolerated procedure well. No Complaints voiced. Vital signs stable. WCTF

## 2023-08-02 NOTE — CASE MANAGEMENT/SOCIAL WORK
Continued Stay Note  [facility]     Patient Name: Jose Hurtado  MRN: [id number]  Today's Date: 8/2/2023    Admit Date: 7/29/2023    Plan: Home with family to transport   Discharge Plan       Row Name 08/02/23 1319       Plan    Plan Home with family to transport    Patient/Family in Agreement with Plan yes    Plan Comments Spoke with patient at bedside. Plan is home with family to transport.                   Discharge Codes    No documentation.                 Expected Discharge Date and Time       Expected Discharge Date Expected Discharge Time    Aug 3, 2023               Jannette Campbell RN

## 2023-08-02 NOTE — PERIOPERATIVE NURSING NOTE
Informed Dr. Sanchez that pt had Eliquis at 2149 yesterday. She is ok to proceed with the Flexible Sigmoidoscopy today and stated she will just look and no plans of taking any biopsy.

## 2023-08-02 NOTE — PLAN OF CARE
Goal Outcome Evaluation:              Outcome Evaluation: Pt NPO for testing. Will continue to follow for diet tolerance. Pt sleeping, dysphagia exercise sheet left at the bedside.

## 2023-08-02 NOTE — PROGRESS NOTES
Consult for urinary retention    - Urinary retention retention is common for hospitalized patients due to the effects of recumbency and polypharmacy  - Consider voiding trial once patient is ambulatory. Place an indwelling catheter upon discharge if patient unable to urinate or PVR > 250.  - If the patient is discharged with an indwelling catheter, please arrange an outpatient consult in the urology office: [phone].  - Will sign off. Please call with questions or concerns.

## 2023-08-03 LAB
BASOPHILS # BLD AUTO: 0.02 10*3/MM3 (ref 0–0.2)
BASOPHILS NFR BLD AUTO: 0.4 % (ref 0–1.5)
DEPRECATED RDW RBC AUTO: 42.1 FL (ref 37–54)
EOSINOPHIL # BLD AUTO: 0.28 10*3/MM3 (ref 0–0.4)
EOSINOPHIL NFR BLD AUTO: 6.1 % (ref 0.3–6.2)
ERYTHROCYTE [DISTWIDTH] IN BLOOD BY AUTOMATED COUNT: 13.5 % (ref 12.3–15.4)
HCT VFR BLD AUTO: 31.8 % (ref 37.5–51)
HGB BLD-MCNC: 10.6 G/DL (ref 13–17.7)
IMM GRANULOCYTES # BLD AUTO: 0.01 10*3/MM3 (ref 0–0.05)
IMM GRANULOCYTES NFR BLD AUTO: 0.2 % (ref 0–0.5)
LYMPHOCYTES # BLD AUTO: 1.09 10*3/MM3 (ref 0.7–3.1)
LYMPHOCYTES NFR BLD AUTO: 23.7 % (ref 19.6–45.3)
MCH RBC QN AUTO: 28.9 PG (ref 26.6–33)
MCHC RBC AUTO-ENTMCNC: 33.3 G/DL (ref 31.5–35.7)
MCV RBC AUTO: 86.6 FL (ref 79–97)
MONOCYTES # BLD AUTO: 0.43 10*3/MM3 (ref 0.1–0.9)
MONOCYTES NFR BLD AUTO: 9.4 % (ref 5–12)
NEUTROPHILS NFR BLD AUTO: 2.76 10*3/MM3 (ref 1.7–7)
NEUTROPHILS NFR BLD AUTO: 60.2 % (ref 42.7–76)
NRBC BLD AUTO-RTO: 0 /100 WBC (ref 0–0.2)
PLATELET # BLD AUTO: 154 10*3/MM3 (ref 140–450)
PMV BLD AUTO: 9.9 FL (ref 6–12)
RBC # BLD AUTO: 3.67 10*6/MM3 (ref 4.14–5.8)
WBC NRBC COR # BLD: 4.59 10*3/MM3 (ref 3.4–10.8)

## 2023-08-03 PROCEDURE — 63710000001 POLYETHYLENE GLYCOL 17 G PACK: Performed by: INTERNAL MEDICINE

## 2023-08-03 PROCEDURE — A9270 NON-COVERED ITEM OR SERVICE: HCPCS | Performed by: INTERNAL MEDICINE

## 2023-08-03 PROCEDURE — 94799 UNLISTED PULMONARY SVC/PX: CPT

## 2023-08-03 PROCEDURE — 99232 SBSQ HOSP IP/OBS MODERATE 35: CPT | Performed by: PHYSICIAN ASSISTANT

## 2023-08-03 PROCEDURE — 63710000001 TAMSULOSIN 0.4 MG CAPSULE: Performed by: INTERNAL MEDICINE

## 2023-08-03 PROCEDURE — 94761 N-INVAS EAR/PLS OXIMETRY MLT: CPT

## 2023-08-03 PROCEDURE — 63710000001 APIXABAN 5 MG TABLET: Performed by: INTERNAL MEDICINE

## 2023-08-03 PROCEDURE — 63710000001 SENNOSIDES-DOCUSATE 8.6-50 MG TABLET: Performed by: INTERNAL MEDICINE

## 2023-08-03 PROCEDURE — 25010000002 METRONIDAZOLE 500 MG/100ML SOLUTION: Performed by: INTERNAL MEDICINE

## 2023-08-03 PROCEDURE — 85025 COMPLETE CBC W/AUTO DIFF WBC: CPT | Performed by: INTERNAL MEDICINE

## 2023-08-03 PROCEDURE — G0378 HOSPITAL OBSERVATION PER HR: HCPCS

## 2023-08-03 PROCEDURE — 63710000001 PANTOPRAZOLE 40 MG TABLET DELAYED-RELEASE: Performed by: INTERNAL MEDICINE

## 2023-08-03 PROCEDURE — 63710000001 PRAMIPEXOLE 1.5 MG TABLET: Performed by: NURSE PRACTITIONER

## 2023-08-03 PROCEDURE — A9270 NON-COVERED ITEM OR SERVICE: HCPCS | Performed by: NURSE PRACTITIONER

## 2023-08-03 PROCEDURE — 97530 THERAPEUTIC ACTIVITIES: CPT

## 2023-08-03 PROCEDURE — 63710000001 METOPROLOL TARTRATE 50 MG TABLET: Performed by: INTERNAL MEDICINE

## 2023-08-03 PROCEDURE — 63710000001 PAROXETINE 20 MG TABLET: Performed by: INTERNAL MEDICINE

## 2023-08-03 PROCEDURE — 94664 DEMO&/EVAL PT USE INHALER: CPT

## 2023-08-03 RX ORDER — PRAMIPEXOLE DIHYDROCHLORIDE 1.5 MG/1
1.5 TABLET ORAL ONCE
Status: COMPLETED | OUTPATIENT
Start: 2023-08-03 | End: 2023-08-03

## 2023-08-03 RX ADMIN — PRAMIPEXOLE DIHYDROCHLORIDE 1.5 MG: 1.5 TABLET ORAL at 22:42

## 2023-08-03 RX ADMIN — METOPROLOL TARTRATE 50 MG: 50 TABLET, FILM COATED ORAL at 20:52

## 2023-08-03 RX ADMIN — TAMSULOSIN HYDROCHLORIDE 0.4 MG: 0.4 CAPSULE ORAL at 09:07

## 2023-08-03 RX ADMIN — APIXABAN 5 MG: 5 TABLET, FILM COATED ORAL at 20:52

## 2023-08-03 RX ADMIN — Medication 10 ML: at 09:32

## 2023-08-03 RX ADMIN — APIXABAN 5 MG: 5 TABLET, FILM COATED ORAL at 09:07

## 2023-08-03 RX ADMIN — SODIUM CHLORIDE 30 ML/HR: 9 INJECTION, SOLUTION INTRAVENOUS at 06:42

## 2023-08-03 RX ADMIN — METOPROLOL TARTRATE 50 MG: 50 TABLET, FILM COATED ORAL at 09:07

## 2023-08-03 RX ADMIN — METRONIDAZOLE 500 MG: 500 INJECTION, SOLUTION INTRAVENOUS at 00:38

## 2023-08-03 RX ADMIN — PAROXETINE HYDROCHLORIDE HEMIHYDRATE 40 MG: 20 TABLET, FILM COATED ORAL at 09:07

## 2023-08-03 RX ADMIN — Medication 10 ML: at 20:52

## 2023-08-03 RX ADMIN — PANTOPRAZOLE SODIUM 40 MG: 40 TABLET, DELAYED RELEASE ORAL at 09:07

## 2023-08-03 RX ADMIN — SENNOSIDES AND DOCUSATE SODIUM 2 TABLET: 50; 8.6 TABLET ORAL at 09:07

## 2023-08-03 RX ADMIN — METRONIDAZOLE 500 MG: 500 INJECTION, SOLUTION INTRAVENOUS at 22:43

## 2023-08-03 RX ADMIN — SENNOSIDES AND DOCUSATE SODIUM 2 TABLET: 50; 8.6 TABLET ORAL at 20:52

## 2023-08-03 RX ADMIN — METRONIDAZOLE 500 MG: 500 INJECTION, SOLUTION INTRAVENOUS at 16:34

## 2023-08-03 RX ADMIN — POLYETHYLENE GLYCOL 3350 17 G: 17 POWDER, FOR SOLUTION ORAL at 09:07

## 2023-08-03 RX ADMIN — TIOTROPIUM BROMIDE INHALATION SPRAY 2 PUFF: 3.12 SPRAY, METERED RESPIRATORY (INHALATION) at 07:57

## 2023-08-03 RX ADMIN — METRONIDAZOLE 500 MG: 500 INJECTION, SOLUTION INTRAVENOUS at 09:10

## 2023-08-03 RX ADMIN — PANTOPRAZOLE SODIUM 40 MG: 40 TABLET, DELAYED RELEASE ORAL at 16:34

## 2023-08-03 NOTE — PLAN OF CARE
Goal Outcome Evaluation:  Plan of Care Reviewed With: (P) patient, spouse        Progress: (P) improving  Outcome Evaluation: (P) Pt seen for PT treatment today. Pt in chair position in bed upon arrival. Pt was eager and ready to get moving. SBA for all bed mobility. CGA with the use of rolling walker while ambulating 700'. Pt with improved endurance. Pt demonstates forward flexed posture that his self corrected towards end of session. Pt with decreased gait speed, increased knee flexion on L, and 2 LOB due to narrow NOEL. Mild unsteadiness noted throughout today's session. Pt will continue to benefit from skilled PT interventions to address overall functional mobility. Rec home with asssist at d/c.

## 2023-08-03 NOTE — PROGRESS NOTES
"Nutrition Services    Patient Name:  Jose Hurtado  YOB: 1950  MRN: [id number]  Admit Date:  7/29/2023  Assessment Date:  08/03/23    FOLLOW UP - CLINICAL NUTRITION    Encounter Information         Reason for Encounter RD f/u.       Current Issues Spoke with pt and pt's family member at bedside who reports pt eating 50-75% of meals. Pt denies n/v and endorses tolerating PO diet well. RD will continue to follow course for PO intake and tolerance.     Current Nutrition Orders & Evaluation of Intake       Oral Nutrition     Current PO Diet Diet: Cardiac Diets; Healthy Heart (2-3 Na+); Texture: Regular Texture (IDDSI 7); Fluid Consistency: Thin (IDDSI 0)   Supplement n/a   PO Evaluation     % PO Intake 50-75%    # of Days Evaluated 1    Factors Affecting Intake  swallow impairment   --  Anthropometrics          Height    Weight Height: 195.6 cm (77\")  Weight: 86.2 kg (190 lb) (08/02/23 1439)    BMI kg/m2 Body mass index is 22.53 kg/mý.  Normal/Healthy (18.4 - 24.9)    Weight trend No new weight available     Labs        Pertinent Labs Reviewed, listed below     Results from last 7 days   Lab Units 07/30/23  0827 07/29/23  1137   SODIUM mmol/L 140 139   POTASSIUM mmol/L 4.2 4.3   CHLORIDE mmol/L 107 103   CO2 mmol/L 24.6 25.0   BUN mg/dL 15 18   CREATININE mg/dL 1.03 1.17   CALCIUM mg/dL 8.4* 9.1   BILIRUBIN mg/dL 0.3 0.4   ALK PHOS U/L 78 109   ALT (SGPT) U/L 13 15   AST (SGOT) U/L 18 20   GLUCOSE mg/dL 84 136*     Results from last 7 days   Lab Units 08/03/23  0716 07/30/23  0827   HEMOGLOBIN g/dL 10.6* 10.6*   HEMATOCRIT % 31.8* 31.9*   WBC 10*3/mm3 4.59 2.58*   ALBUMIN g/dL  --  3.1*     Results from last 7 days   Lab Units 08/03/23  0716 07/30/23  0827 07/29/23  1137   PLATELETS 10*3/mm3 154 157 194     COVID19   Date Value Ref Range Status   01/03/2023 Not Detected Not Detected - Ref. Range Final     Lab Results   Component Value Date    HGBA1C 5.50 08/06/2021          Medications            " Scheduled Medications apixaban, 5 mg, Oral, Q12H  senna-docusate sodium, 2 tablet, Oral, BID   And  polyethylene glycol, 17 g, Oral, Daily   And  bisacodyl, 5 mg, Oral, Daily  cefTRIAXone, 1,000 mg, Intravenous, Q24H  metoprolol tartrate, 50 mg, Oral, BID  metroNIDAZOLE, 500 mg, Intravenous, Q8H  pantoprazole, 40 mg, Oral, BID AC  PARoxetine, 40 mg, Oral, QAM  sodium chloride, 10 mL, Intravenous, Q12H  tamsulosin, 0.4 mg, Oral, Daily  tiotropium bromide monohydrate, 2 puff, Inhalation, Daily        Infusions sodium chloride, 30 mL/hr, Last Rate: 30 mL/hr (08/03/23 0642)  sodium chloride 0.9 % with KCl 20 mEq, 100 mL/hr, Last Rate: 100 mL/hr (08/02/23 0345)        PRN Medications   acetaminophen **OR** acetaminophen **OR** acetaminophen    albuterol    senna-docusate sodium **AND** polyethylene glycol **AND** bisacodyl **AND** bisacodyl    Calcium Replacement - Follow Nurse / BPA Driven Protocol    Magnesium Standard Dose Replacement - Follow Nurse / BPA Driven Protocol    ondansetron    Phosphorus Replacement - Follow Nurse / BPA Driven Protocol    Potassium Replacement - Follow Nurse / BPA Driven Protocol    sodium chloride    sodium chloride    sodium chloride     Physical Findings          Physical Appearance alert, oriented, room air   Oral/Mouth Cavity tooth or teeth missing   Edema  lower extremity , 2+ (mild)   Gastrointestinal non-distended , normoactive, last bowel movement: 8/2   Skin  bruising   Tubes/Drains/Lines none   NFPE See Malnutrition Severity Assessment   --  NUTRITION INTERVENTION / PLAN OF CARE  Intervention Goal         Intervention Goal(s) Nutrition support treatment, Improved nutrition related labs, Reduce/improve symptoms, Meet estimated needs, Disease management/therapy, Tolerate PO , Continue positive trend, and Maintain weight     Nutrition Intervention         RD Action Encourage intake, Follow Tx Progress, Care plan reviewed, and Recommend/ordered:      Nutrition Prescription          Diet Prescription     Supplement Prescription Boost Plus, BID   EN/PN Prescription    New Prescription Ordered? Yes   --  Monitor/Evaluation        Monitor Per protocol, I&O, PO intake, Supplement intake, Pertinent labs, Weight, Skin status, GI status, Symptoms   Discharge Needs Pending clinical course   Education Will instruct as appropriate   --    RD to follow up per protocol.    Electronically signed by:  Nuha Wallace RD  08/03/23 14:06 EDT

## 2023-08-03 NOTE — THERAPY TREATMENT NOTE
Patient Name: Jose Hurtado  : 1950    MRN: [id number]                              Today's Date: 8/3/2023       Admit Date: 2023    Visit Dx:     ICD-10-CM ICD-9-CM   1. Colitis  K52.9 558.9   2. Proctitis  K62.89 569.49   3. Bladder distention  N32.89 596.89   4. Nausea and vomiting, unspecified vomiting type  R11.2 787.01   5. History of esophagectomy  Z98.890 V15.29    Z90.49    6. Abnormal CT scan  R93.89 793.99   7. Malignant neoplasm of prostate  C61 185   8. History of esophageal cancer  Z85.01 V10.03     Patient Active Problem List   Diagnosis    History of esophageal cancer    Adenomatous polyp of colon    Malignant neoplasm of prostate    RLS (restless legs syndrome)    History of DVT (deep vein thrombosis)    Recurrent major depressive disorder, in partial remission    Hypertension    Anemia    Insomnia due to other mental disorder    Peripheral polyneuropathy    B12 deficiency    Lymphedema    Iron deficiency    Gastroparesis    History of recurrent deep vein thrombosis (DVT)    Tremor of both hands    Gastrointestinal hemorrhage    Acute blood loss anemia    Pancytopenia    Chronic venous hypertension due to DVT    Bleeding hemorrhoid    Malabsorption of iron    Iron deficiency anemia    History of esophageal cancer    Abnormal CT scan    Generalized weakness    Chronic anticoagulation    CKD (chronic kidney disease) stage 2, GFR 60-89 ml/min    Dysphagia    NSTEMI (non-ST elevated myocardial infarction)    Bronchitis    Dyspnea    Elevated troponin    Atrial fibrillation    Chronic obstructive pulmonary disease    Congestive heart failure    Gait instability    Dark stools    Proctitis    Nausea and vomiting    Acute urinary retention     Past Medical History:   Diagnosis Date    Acute deep vein thrombosis (DVT) of popliteal vein of left lower extremity 2020    Anemia     Anti-phospholipid syndrome     On lifelong anticoagulation therapy    Bladder disorder     LEAKAGE   ON MED   wers pads    Bleeding hemorrhoids     Chronic kidney disease     Community acquired pneumonia     HISTORY OF IN 2014    Congestive heart failure     COPD (chronic obstructive pulmonary disease)     Deep venous thrombosis 2006, 2008    Left lower extremity multiple    Depression     Esophageal carcinoma 12/31/2014    had chemo and radiation prior surgery    Hemorrhoids     HH (hiatus hernia)     History of atrial fibrillation 2015    ONE EPISODE WHILE HOSPITALIZED    History of kidney stones     History of nephrolithiasis     History of pancreatitis     PT STATES MANY YEARS AGO    History of radiation therapy     History of transfusion     Hypertension     Long-term (current) use of anticoagulants, INR goal 2.0-3.0     Lymphedema     Malignant neoplasm of prostate     Other hyperlipidemia 01/30/2018 January 30, 2018 lipid panel risk 12.8%    Restless legs syndrome     Sleep apnea     OCCASIONALLY WEARS CPAP    Squamous carcinoma     on the head     Past Surgical History:   Procedure Laterality Date    APPENDECTOMY  1950    BRONCHOSCOPY      (Diagnostic)    CARDIAC CATHETERIZATION N/A 5/14/2022    Procedure: Left Heart Cath;  Surgeon: Eric So MD;  Location:  [facility];  Service: Cardiology;  Laterality: N/A;    CARDIAC CATHETERIZATION N/A 5/14/2022    Procedure: Coronary angiography;  Surgeon: Eric So MD;  Location:  [facility];  Service: Cardiology;  Laterality: N/A;    CARDIAC CATHETERIZATION N/A 5/14/2022    Procedure: Left ventriculography;  Surgeon: Eric So MD;  Location:  [facility];  Service: Cardiology;  Laterality: N/A;    CATARACT EXTRACTION Bilateral 2014    COLONOSCOPY  12/15/2014    Complete / Description: EH, IH, torts, stool, follow-up colonoscopy due in 5 years.    COLONOSCOPY N/A 6/13/2017    non-thrombosed external hemorrhoids, normal examined ileum, IH    COLONOSCOPY N/A 2/26/2019    Procedure:  COLONOSCOPY with Cold Polypectomy;  Surgeon: Mulugeta Millan MD;  Location: [facility] ENDOSCOPY;  Service: Gastroenterology    COLONOSCOPY N/A 12/16/2020    Procedure: COLONOSCOPY to cecum into TI;  Surgeon: Mesha Sanchez MD;  Location: [facility] ENDOSCOPY;  Service: Gastroenterology;  Laterality: N/A;  pre: lower GI bleed  post: hemorrhoids     CYSTOSCOPY W/ LASER LITHOTRIPSY      ENDOSCOPY N/A 6/13/2017    Procedure: ESOPHAGOGASTRODUODENOSCOPY WITH COLD BIOPSY;  Surgeon: Lake Gonzalez MD;  Location: [facility] ENDOSCOPY;  Service:     ENDOSCOPY N/A 11/18/2021    Procedure: ESOPHAGOGASTRODUODENOSCOPY WITH  DILATATION WITH FLUOROSCOPY;  Surgeon: Jose Christine III, MD;  Location: [facility] ENDOSCOPY;  Service: Gastroenterology;  Laterality: N/A;  Pre: dysphagia, h/x of adinocarcinoma of esophagus  Post: same    ESOPHAGECTOMY      April 2015, stage IIB esophageal carcinoma, sub-total resection.    ESOPHAGECTOMY      Esophagectomy Subtotal [place] Procedure    EXCISION LESION  08/2012    Removal of Squamous Cell CA on Head    HAMMER TOE REPAIR  09/2014    Hammertoe Operation (Each Toe), 10/2014    HAMMER TOE REPAIR Left 10/3/2017    Procedure: Left second third and fourth distal interphalangeal joint resection with flexor tenotomy;  Surgeon: Mulugeta Lira MD;  Location: [facility] OR OSC;  Service:     HEMORRHOIDECTOMY N/A 12/23/2020    Procedure: HEMORRHOIDECTOMY;  Surgeon: Billy Julio Jr., MD;  Location: [facility] MAIN OR;  Service: General;  Laterality: N/A;    HERNIA REPAIR      incisional    JEJUNOSTOMY      Laparoscopic    JEJUNOSTOMY      tube removal     KNEE SURGERY Bilateral 1967, 1973, 1981    PATELLA SURGERY Left     removed    PILONIDAL CYST / SINUS EXCISION      WY ARTHRP KNE CONDYLE&PLATU MEDIAL&LAT COMPARTMENTS Right 3/26/2018    Procedure: TOTAL KNEE ARTHROPLASTY;  Surgeon: Renny Solis MD;  Location: [facility] MAIN OR;  Service: Orthopedics    PROSTATECTOMY  2010    PYLOROPLASTY       SIGMOIDOSCOPY N/A 8/2/2023    Procedure: SIGMOIDOSCOPY FLEXIBLE;  Surgeon: Mesha Sanchez MD;  Location: [facility] ENDOSCOPY;  Service: Gastroenterology;  Laterality: N/A;  PRE- ABNORMAL CT  POST- HEMORRHOIDS, ULCERATION    SPINAL FUSION  02/1998    C 5,6    TONSILLECTOMY      UPPER GASTROINTESTINAL ENDOSCOPY  12/15/2014    LA Grade D esophagitis, Pardo's, HH, multiple duodenal ulcers    VENTRAL/INCISIONAL HERNIA REPAIR N/A 4/14/2016    Procedure: VENTRAL/INCISIONAL HERNIA REPAIR, open, with mesh, and component separation;  Surgeon: Darren Rivas MD;  Location: [facility] MAIN OR;  Service:       General Information       Row Name 08/03/23 1440          Physical Therapy Time and Intention    Document Type therapy note (daily note) (P)   -SK     Mode of Treatment individual therapy;physical therapy (P)   -SK       Row Name 08/03/23 1440          General Information    Patient Profile Reviewed yes (P)   -SK     Prior Level of Function independent: (P)   -SK     Existing Precautions/Restrictions fall (P)   -SK     Barriers to Rehab hearing deficit (P)   -SK       Row Name 08/03/23 1440          Living Environment    People in Home spouse (P)   -SK       Row Name 08/03/23 1440          Home Main Entrance    Number of Stairs, Main Entrance none (P)   -SK       Row Name 08/03/23 1440          Cognition    Orientation Status (Cognition) oriented to;person;place (P)   -SK       Row Name 08/03/23 1440          Safety Issues, Functional Mobility    Safety Issues Affecting Function (Mobility) awareness of need for assistance (P)   -SK     Impairments Affecting Function (Mobility) balance;endurance/activity tolerance;strength (P)   -SK     Comment, Safety Issues/Impairments (Mobility) gait belt and nonskid socks donned; pt also wore shoes during ambulation. (P)   -SK               User Key  (r) = Recorded By, (t) = Taken By, (c) = Cosigned By      Initials Name Provider Type    Yolanda Cao, PT Student PT Student                    Mobility       Row Name 08/03/23 1442          Bed Mobility    Bed Mobility bed mobility (all) activities (P)   -SK     All Activities, [place] (Bed Mobility) independent (P)   -SK       Row Name 08/03/23 1442          Sit-Stand Transfer    Sit-Stand [place] (Transfers) contact guard (P)   -SK     Assistive Device (Sit-Stand Transfers) walker, front-wheeled (P)   -SK     Comment, (Sit-Stand Transfer) increased height of bed. (P)   -SK       Row Name 08/03/23 1442          Gait/Stairs (Locomotion)    [place] Level (Gait) contact guard;verbal cues (P)   -SK     Assistive Device (Gait) walker, front-wheeled (P)   -SK     Distance in Feet (Gait) 700' (P)   -SK     Deviations/Abnormal Patterns (Gait) base of support, narrow;gait speed decreased;stride length decreased (P)   -SK     Bilateral Gait Deviations forward flexed posture (P)   -SK     Left Sided Gait Deviations -- (P)   increased knee flexion on L  -SK     Comment, (Gait/Stairs) Pt CGA using RW while ambulating 700'. Pt demonstrates increased knee flexion on L. VC to correct posture; pt demonstrated carryover and fixed posture without any cueing towards end of session.2 LOB noted during ambulation;Pt demonstrates a narrow NOEL when walking and tripped over feet. VC to increase NOEL which demonstrated improvement. (P)   -SK               User Key  (r) = Recorded By, (t) = Taken By, (c) = Cosigned By      Initials Name Provider Type    SK Yolanda Mahan, PT Student PT Student                   Obj/Interventions       Row Name 08/03/23 1445          Motor Skills    Therapeutic Exercise other (see comments) (P)   Bilat LE: 1x10 LAQ, Knee marches  -SK       Row Name 08/03/23 1445          Balance    Static Standing Balance verbal cues;contact guard (P)   -SK     Dynamic Standing Balance verbal cues;contact guard (P)   -SK     Position/Device Used, Standing Balance supported;walker, front-wheeled (P)   -SK     Comment, Balance 2 LOB  noted during ambulation;Pt demonstrates a narrow NOEL when walking and tripped over feet. VC to increase NOEL which demonstrated improvement. (P)   -SK               User Key  (r) = Recorded By, (t) = Taken By, (c) = Cosigned By      Initials Name Provider Type    Yolanda Cao, PT Student PT Student                   Goals/Plan    No documentation.                  Clinical Impression       Row Name 08/03/23 1448          Pain    Pretreatment Pain Rating 0/10 - no pain (P)   -SK     Posttreatment Pain Rating 0/10 - no pain (P)   -SK       Row Name 08/03/23 1448          Plan of Care Review    Plan of Care Reviewed With patient;spouse (P)   -SK     Progress improving (P)   -SK     Outcome Evaluation Pt seen for PT treatment today. Pt in chair position in bed upon arrival. Pt was eager and ready to get moving. SBA for all bed mobility. CGA with the use of rolling walker while ambulating 700'. Pt with improved endurance. Pt demonstates forward flexed posture that he self corrected towards end of session. Pt with decreased gait speed, increased knee flexion on L, and 2 LOB due to narrow NOEL. Mild unsteadiness noted throughout today's session. Pt will continue to benefit from skilled PT interventions to address overall functional mobility. Rec home with asssist at d/c. (P)   -SK       Row Name 08/03/23 1448          Therapy Assessment/Plan (PT)    Rehab Potential (PT) good, to achieve stated therapy goals (P)   -SK     Criteria for Skilled Interventions Met (PT) yes (P)   -SK       Row Name 08/03/23 1448          Positioning and Restraints    Pre-Treatment Position in bed (P)   -SK     Post Treatment Position bed (P)   -SK     In Bed notified nsg;sitting;call light within reach;encouraged to call for assist;exit alarm on;with family/caregiver (P)   -SK               User Key  (r) = Recorded By, (t) = Taken By, (c) = Cosigned By      Initials Name Provider Type    Yolanda Cao, PT Student PT Student                    Outcome Measures       Row Name 08/03/23 1455 08/03/23 0800       How much help from another person do you currently need...    Turning from your back to your side while in flat bed without using bedrails? 4 (P)   -SK 4  -KP    Moving from lying on back to sitting on the side of a flat bed without bedrails? 4 (P)   -SK 4  -KP    Moving to and from a bed to a chair (including a wheelchair)? 3 (P)   -SK 4  -KP    Standing up from a chair using your arms (e.g., wheelchair, bedside chair)? 3 (P)   -SK 4  -KP    Climbing 3-5 steps with a railing? 3 (P)   -SK 3  -KP    To walk in hospital room? 3 (P)   -SK 3  -KP    AM-PAC 6 Clicks Score (PT) 20 (P)   -SK 22  -KP    Highest level of mobility 6 --> Walked 10 steps or more (P)   -SK 7 --> Walked 25 feet or more  -KP      Row Name 08/03/23 1455          Functional Assessment    Outcome Measure Options AM-PAC 6 Clicks Basic Mobility (PT) (P)   -SK               User Key  (r) = Recorded By, (t) = Taken By, (c) = Cosigned By      Initials Name Provider Type    Sara Beverly, RN Registered Nurse    Yolanda Cao, PT Student PT Student                                 Physical Therapy Education       Title: PT OT SLP Therapies (Done)       Topic: Physical Therapy (Done)       Point: Mobility training (Done)       Learning Progress Summary             Patient Acceptance, E,TB, VU,NR,DU by SK at 8/3/2023 1456    Acceptance, E, VU by KS at 8/1/2023 0951    Acceptance, E,TB, VU,DU by CS at 7/31/2023 1155    Acceptance, E, VU by  at 7/30/2023 1427    Acceptance, E,D,TB, VU,DU,NR by  at 7/30/2023 1225                         Point: Home exercise program (Done)       Learning Progress Summary             Patient Acceptance, E,TB, VU,NR,DU by SK at 8/3/2023 1456    Acceptance, E, VU by KS at 8/1/2023 0951    Acceptance, E,TB, VU,DU by  at 7/31/2023 1155                         Point: Body mechanics (Done)       Learning Progress Summary             Patient Acceptance,  E,TB, VU,NR,DU by SK at 8/3/2023 1456    Acceptance, E, VU by KS at 8/1/2023 0951    Acceptance, E,TB, VU,DU by  at 7/31/2023 1155                         Point: Precautions (Done)       Learning Progress Summary             Patient Acceptance, E,TB, VU,NR,DU by SK at 8/3/2023 1456    Acceptance, E, VU by KS at 8/1/2023 0951    Acceptance, E,TB, VU,DU by  at 7/31/2023 1155                                         User Key       Initials Effective Dates Name Provider Type Discipline     08/04/22 -  Rachel Moncada, RN Registered Nurse Nurse     05/26/20 -  Anabella Gallegos, PT Physical Therapist PT    KS 09/02/21 -  Najma Aviles, RN Registered Nurse Nurse     09/22/22 -  Saumya Bonner, PT Physical Therapist PT    SK 07/28/23 -  Yolanda Mahan, MANDA Student PT Student PT                  PT Recommendation and Plan     Plan of Care Reviewed With: (P) patient, spouse  Progress: (P) improving  Outcome Evaluation: (P) Pt seen for PT treatment today. Pt in chair position in bed upon arrival. Pt was eager and ready to get moving. SBA for all bed mobility. CGA with the use of rolling walker while ambulating 700'. Pt with improved endurance. Pt demonstates forward flexed posture that he self corrected towards end of session. Pt with decreased gait speed, increased knee flexion on L, and 2 LOB due to narrow NOEL. Mild unsteadiness noted throughout today's session. Pt will continue to benefit from skilled PT interventions to address overall functional mobility. Rec home with asssist at d/c.     Time Calculation:         PT Charges       Row Name 08/03/23 1501             Time Calculation    Start Time 1310 (P)   -SK      Stop Time 1332 (P)   -SK      Time Calculation (min) 22 min (P)   -SK      PT Received On 08/03/23 (P)   -SK      PT - Next Appointment 08/04/23 (P)   -SK         Time Calculation- PT    Total Timed Code Minutes- PT 22 minute(s) (P)   -SK                User Key  (r) = Recorded By, (t) = Taken  By, (c) = Cosigned By      Initials Name Provider Type    SK Yolanda Mahan, PT Student PT Student                  Therapy Charges for Today       Code Description Service Date Service Provider Modifiers Qty    [id number]  PT THERAPEUTIC ACT EA 15 MIN 8/3/2023 Yolanda Mahan, PT Student GP 1            PT G-Codes  Outcome Measure Options: (P) AM-PAC 6 Clicks Basic Mobility (PT)  AM-PAC 6 Clicks Score (PT): (P) 20       Yolanda Mahan PT Student  8/3/2023

## 2023-08-03 NOTE — PROGRESS NOTES
"DAILY PROGRESS NOTE  [facility]    Patient Identification:  Name: Jose Hurtado  Age: 73 y.o.  Sex: male  :  1950  MRN: [id number]         Primary Care Physician: Brandin Bolton MD    Subjective: patient is improving; catheter is out  Interval History: follow up for      Objective:    Scheduled Meds:apixaban, 5 mg, Oral, Q12H  senna-docusate sodium, 2 tablet, Oral, BID   And  polyethylene glycol, 17 g, Oral, Daily   And  bisacodyl, 5 mg, Oral, Daily  cefTRIAXone, 1,000 mg, Intravenous, Q24H  metoprolol tartrate, 50 mg, Oral, BID  metroNIDAZOLE, 500 mg, Intravenous, Q8H  pantoprazole, 40 mg, Oral, BID AC  PARoxetine, 40 mg, Oral, QAM  sodium chloride, 10 mL, Intravenous, Q12H  tamsulosin, 0.4 mg, Oral, Daily  tiotropium bromide monohydrate, 2 puff, Inhalation, Daily      Continuous Infusions:sodium chloride, 30 mL/hr, Last Rate: 30 mL/hr (23 0642)  sodium chloride 0.9 % with KCl 20 mEq, 100 mL/hr, Last Rate: 100 mL/hr (23 0345)        Vital signs in last 24 hours:  Temp:  [97.9 øF (36.6 øC)-99 øF (37.2 øC)] 99 øF (37.2 øC)  Heart Rate:  [56-86] 63  Resp:  [16] 16  BP: (121-133)/(59-72) 123/59    Intake/Output:    Intake/Output Summary (Last 24 hours) at 8/3/2023 1950  Last data filed at 8/3/2023 0513  Gross per 24 hour   Intake --   Output 1500 ml   Net -1500 ml       Exam:  /59 (BP Location: Left arm, Patient Position: Lying)   Pulse 63   Temp 99 øF (37.2 øC) (Oral)   Resp 16   Ht 195.6 cm (77\")   Wt 86.2 kg (190 lb)   SpO2 98%   BMI 22.53 kg/mý     General Appearance:    Alert, cooperative, no distress, AAOx3                          Head:    Normocephalic, without obvious abnormality, atraumatic                           Eyes:    PERRL, conjunctivae/corneas clear, EOM's intact, both eyes                         Throat:   Lips, tongue, gums normal; oral mucosa pink and moist                           Neck:   Supple, symmetrical, trachea midline, no JVD                   "       Lungs:    Clear to auscultation bilaterally, respirations unlabored                 Chest Wall:    No tenderness or deformity                          Heart:    Regular rate and rhythm, S1 and S2 normal, no murmur,no  rub or gallop                  Abdomen:     Soft, nontender, bowel sounds active, no masses, no organomegaly                  Extremities:   Extremities normal, atraumatic, no cyanosis or edema                        Pulses:   Pulses palpable in all extremities                            Skin:   Skin is warm and dry,  no rashes or palpable lesions                  N   Labs in chart were reviewed.  WBC   Date Value Ref Range Status   08/03/2023 4.59 3.40 - 10.80 10*3/mm3 Final     Hemoglobin   Date Value Ref Range Status   08/03/2023 10.6 (L) 13.0 - 17.7 g/dL Final     Hematocrit   Date Value Ref Range Status   08/03/2023 31.8 (L) 37.5 - 51.0 % Final     Platelets   Date Value Ref Range Status   08/03/2023 154 140 - 450 10*3/mm3 Final     No results found for: NA, K, CL, CO2, BUN, CREATININE, GLUCOSE  No results found for: CALCIUM, MG, PHOS      Assessment:  Active Hospital Problems    Diagnosis  POA    **Proctitis [K62.89]  Yes    Nausea and vomiting [R11.2]  Yes    Acute urinary retention [R33.8]  Yes    Chronic obstructive pulmonary disease [J44.9]  Yes    Atrial fibrillation [I48.91]  Yes    CKD (chronic kidney disease) stage 2, GFR 60-89 ml/min [N18.2]  Yes    History of esophageal cancer [Z85.01]  Yes    Iron deficiency [E61.1]  Yes    Lymphedema [I89.0]  Yes    Peripheral polyneuropathy [G62.9]  Yes    Anemia [D64.9]  Yes    Hypertension [I10]  Yes    Malignant neoplasm of prostate [C61]  Yes      Resolved Hospital Problems   No resolved problems to display.       Plan:  King is out  Outpatient follow up with urology  Flex sig done  Abdominal pain is better  Bowel regimen per gi  Dw patient  Medium risk    Darby Satish Orozco MD  8/3/2023  19:50 EDT

## 2023-08-03 NOTE — PROGRESS NOTES
[facility]  Inpatient Progress Note    Reason for Follow-up: Abnormal imaging of the rectum, abdominal pain    Subjective     Interval History:   Patient reports he is doing well today.  He denies abdominal pain, nausea, vomiting, melena hematochezia.  He is tolerating p.o. intake well.    8/2-flex sig with 2 small ulcers - no bx taken bc of eliquis but appear benign process    8/3 labs show hemoglobin stable at 10.6.    Current Facility-Administered Medications:     acetaminophen (TYLENOL) tablet 650 mg, 650 mg, Oral, Q4H PRN **OR** acetaminophen (TYLENOL) 160 MG/5ML solution 650 mg, 650 mg, Oral, Q4H PRN **OR** acetaminophen (TYLENOL) suppository 650 mg, 650 mg, Rectal, Q4H PRN, Jose Osborne MD    albuterol (PROVENTIL) nebulizer solution 0.083% 2.5 mg/3mL, 2.5 mg, Nebulization, Q6H PRN, Jose Osborne MD    apixaban (ELIQUIS) tablet 5 mg, 5 mg, Oral, Q12H, Jose Osborne MD, 5 mg at 08/03/23 0907    sennosides-docusate (PERICOLACE) 8.6-50 MG per tablet 2 tablet, 2 tablet, Oral, BID, 2 tablet at 08/03/23 0907 **AND** polyethylene glycol (MIRALAX) packet 17 g, 17 g, Oral, Daily, 17 g at 08/03/23 0907 **AND** bisacodyl (DULCOLAX) EC tablet 5 mg, 5 mg, Oral, Daily, 5 mg at 08/01/23 0921 **AND** bisacodyl (DULCOLAX) suppository 10 mg, 10 mg, Rectal, Daily PRN, Jameel Valencia MD    Calcium Replacement - Follow Nurse / BPA Driven Protocol, , Does not apply, PRNDylon Jawed, MD    cefTRIAXone (ROCEPHIN) 1,000 mg in sodium chloride 0.9 % 100 mL IVPB-VTB, 1,000 mg, Intravenous, Q24H, Jose Osborne MD, Last Rate: 200 mL/hr at 08/02/23 2355, 1,000 mg at 08/02/23 2355    Magnesium Standard Dose Replacement - Follow Nurse / BPA Driven Protocol, , Does not apply, PRN, Jose Osborne MD    metoprolol tartrate (LOPRESSOR) tablet 50 mg, 50 mg, Oral, BID, Jose Osborne MD, 50 mg at 08/03/23 0907    metroNIDAZOLE (FLAGYL) IVPB 500 mg, 500 mg, Intravenous, Q8H, Jose Osborne MD, Last Rate: 100 mL/hr at  08/03/23 0910, 500 mg at 08/03/23 0910    ondansetron (ZOFRAN) injection 4 mg, 4 mg, Intravenous, Q6H PRN, Jose Osborne MD    pantoprazole (PROTONIX) EC tablet 40 mg, 40 mg, Oral, BID AC, Jose Osborne MD, 40 mg at 08/03/23 0907    PARoxetine (PAXIL) tablet 40 mg, 40 mg, Oral, QAM, Jose Osborne MD, 40 mg at 08/03/23 0907    Phosphorus Replacement - Follow Nurse / BPA Driven Protocol, , Does not apply, PRN, Jose Osborne MD    Potassium Replacement - Follow Nurse / BPA Driven Protocol, , Does not apply, PRN, Jose Osborne MD    sodium chloride 0.9 % flush 10 mL, 10 mL, Intravenous, Q12H, Jose Osborne MD, 10 mL at 08/03/23 0932    sodium chloride 0.9 % flush 10 mL, 10 mL, Intravenous, PRN, Jose Osborne MD    sodium chloride 0.9 % infusion 40 mL, 40 mL, Intravenous, PRN, Jose Osborne MD    sodium chloride 0.9 % infusion, 30 mL/hr, Intravenous, Continuous PRN, Mesha Sanchez MD, Last Rate: 30 mL/hr at 08/03/23 0642, 30 mL/hr at 08/03/23 0642    sodium chloride 0.9 % with KCl 20 mEq/L infusion, 100 mL/hr, Intravenous, Continuous, Jose Osborne MD, Last Rate: 100 mL/hr at 08/02/23 0345, 100 mL/hr at 08/02/23 0345    tamsulosin (FLOMAX) 24 hr capsule 0.4 mg, 0.4 mg, Oral, Daily, Poncho Doss, APRN, 0.4 mg at 08/03/23 0907    tiotropium (SPIRIVA RESPIMAT) 2.5 mcg/act aerosol solution inhaler, 2 puff, Inhalation, Daily, Jose Osborne MD, 2 puff at 08/03/23 0757  Review of Systems:    The following systems were reviewed and negative;  respiratory and cardiovascular    Objective     Vital Signs  Temp:  [97.7 øF (36.5 øC)-98.5 øF (36.9 øC)] 98.2 øF (36.8 øC)  Heart Rate:  [56-86] 56  Resp:  [16] 16  BP: (109-140)/(59-74) 125/64  Body mass index is 22.53 kg/mý.    Intake/Output Summary (Last 24 hours) at 8/3/2023 1433  Last data filed at 8/3/2023 0513  Gross per 24 hour   Intake 600 ml   Output 1500 ml   Net -900 ml     No intake/output data recorded.     Physical Exam:    General: patient awake, alert and  cooperative   Eyes: Normal lids and lashes, no scleral icterus   Skin: warm and dry, not jaundiced   Pulm: regular and unlabored, O2 via nasal cannula   Abdomen: soft, nontender, nondistended; normal bowel sounds   Psychiatric: Normal mood and behavior; memory intact     Results Review:     I reviewed the patient's new clinical results.    Results from last 7 days   Lab Units 08/03/23  0716 07/30/23  0827 07/29/23  1137   WBC 10*3/mm3 4.59 2.58* 3.19*   HEMOGLOBIN g/dL 10.6* 10.6* 11.6*   HEMATOCRIT % 31.8* 31.9* 34.5*   PLATELETS 10*3/mm3 154 157 194     Results from last 7 days   Lab Units 07/30/23  0827 07/29/23  1137   SODIUM mmol/L 140 139   POTASSIUM mmol/L 4.2 4.3   CHLORIDE mmol/L 107 103   CO2 mmol/L 24.6 25.0   BUN mg/dL 15 18   CREATININE mg/dL 1.03 1.17   CALCIUM mg/dL 8.4* 9.1   BILIRUBIN mg/dL 0.3 0.4   ALK PHOS U/L 78 109   ALT (SGPT) U/L 13 15   AST (SGOT) U/L 18 20   GLUCOSE mg/dL 84 136*         Lab Results   Lab Value Date/Time    LIPASE 25 07/29/2023 1137    LIPASE 18 08/30/2016 2057       Radiology:  CT Abdomen Pelvis With Contrast   Final Result   1. Rectal wall thickening with surrounding stranding/inflammation   consistent with focal colitis/proctitis.   2. Moderate distention of the urinary bladder. There is also hydroureter   and mild hydronephrosis with distention of the renal pelvises without   evidence for obstructing stone. Small nonobstructing renal stones are   present.   3. Previous esophagectomy with gastric pull-through. Chronic loculated   right basal pleural effusion with surrounding pleural thickening,   similar to prior exam 03/29/2023.       Radiation dose reduction techniques were utilized, including automated   exposure control and exposure modulation based on body size.       This report was finalized on 7/29/2023 1:59 PM by Dr. Andrade Boo M.D.          XR Chest 1 View   Final Result   No focal pulmonary consolidation. Minimal right pleural   effusion.       This  report was finalized on 7/29/2023 12:35 PM by Dr. Sumanth Boyer M.D.              Assessment & Plan     Active Hospital Problems    Diagnosis     **Proctitis     Nausea and vomiting     Acute urinary retention     Chronic obstructive pulmonary disease     Atrial fibrillation     CKD (chronic kidney disease) stage 2, GFR 60-89 ml/min     History of esophageal cancer     Iron deficiency     Lymphedema     Peripheral polyneuropathy     Anemia     Hypertension     Malignant neoplasm of prostate        Assessment:  2 small rectal ulcers on flexible sigmoidoscopy  Abdominal pain-resolved  History of esophageal cancer status post esophagectomy  History of bleeding hemorrhoids status post hemorrhoidectomy with Dr. Julio 2020  History of prostate cancer status post radiation 2014  On Eliquis      Plan:  Flexible sigmoidoscopy with 2 small rectal ulcers but no evidence of overt proctitis.  Likely the ulcers are from constipation which is now resolved.  Recommend aggressive bowel regimen.  Continue MiraLAX daily, and Angi-Colace 2 tablets twice daily  Continue to monitor stools and ensure he is having regular daily bowel movements.  Hemoglobin stable at 10.6.  Okay for discharge from a GI standpoint.  No further recommendations at this time.  Our service will sign off.  Thank you for the consult and please call us back if we are needed further.    I discussed the patients findings and my recommendations with patient.    Dragon dictation used throughout this note.            Yahaira Strong PA-C  [facility]  [address] [place] [address]  Office: [phone]

## 2023-08-03 NOTE — PLAN OF CARE
Goal Outcome Evaluation:  AOX4. VSS.  King catheter removed at approximately 1400. DTV by 2000.  Up with walker/assist x1.  IVF infusing.  Anticipate possible discharge tomorrow.

## 2023-08-04 ENCOUNTER — READMISSION MANAGEMENT (OUTPATIENT)
Dept: CALL CENTER | Facility: HOSPITAL | Age: 73
End: 2023-08-04
Payer: MEDICARE

## 2023-08-04 ENCOUNTER — TRANSCRIBE ORDERS (OUTPATIENT)
Dept: HOME HEALTH SERVICES | Facility: HOME HEALTHCARE | Age: 73
End: 2023-08-04
Payer: MEDICARE

## 2023-08-04 ENCOUNTER — DOCUMENTATION (OUTPATIENT)
Dept: HOME HEALTH SERVICES | Facility: HOME HEALTHCARE | Age: 73
End: 2023-08-04
Payer: MEDICARE

## 2023-08-04 ENCOUNTER — HOME HEALTH ADMISSION (OUTPATIENT)
Dept: HOME HEALTH SERVICES | Facility: HOME HEALTHCARE | Age: 73
End: 2023-08-04
Payer: MEDICARE

## 2023-08-04 VITALS
TEMPERATURE: 97.5 F | OXYGEN SATURATION: 95 % | HEIGHT: 77 IN | WEIGHT: 190 LBS | BODY MASS INDEX: 22.43 KG/M2 | DIASTOLIC BLOOD PRESSURE: 47 MMHG | HEART RATE: 50 BPM | SYSTOLIC BLOOD PRESSURE: 98 MMHG | RESPIRATION RATE: 14 BRPM

## 2023-08-04 DIAGNOSIS — B99.9 INTRA-ABDOMINAL INFECTION: ICD-10-CM

## 2023-08-04 DIAGNOSIS — K62.89 PROCTITIS: ICD-10-CM

## 2023-08-04 DIAGNOSIS — K59.00 CONSTIPATION, UNSPECIFIED CONSTIPATION TYPE: ICD-10-CM

## 2023-08-04 DIAGNOSIS — R33.9 URINARY RETENTION: ICD-10-CM

## 2023-08-04 DIAGNOSIS — Z97.8 FOLEY CATHETER IN PLACE: Primary | ICD-10-CM

## 2023-08-04 PROBLEM — E43 SEVERE MALNUTRITION: Status: ACTIVE | Noted: 2023-08-04

## 2023-08-04 PROCEDURE — 97530 THERAPEUTIC ACTIVITIES: CPT

## 2023-08-04 PROCEDURE — A9270 NON-COVERED ITEM OR SERVICE: HCPCS | Performed by: INTERNAL MEDICINE

## 2023-08-04 PROCEDURE — 94799 UNLISTED PULMONARY SVC/PX: CPT

## 2023-08-04 PROCEDURE — 63710000001 BISACODYL 5 MG TABLET DELAYED-RELEASE: Performed by: INTERNAL MEDICINE

## 2023-08-04 PROCEDURE — 25010000002 CEFTRIAXONE PER 250 MG: Performed by: INTERNAL MEDICINE

## 2023-08-04 PROCEDURE — 63710000001 APIXABAN 5 MG TABLET: Performed by: INTERNAL MEDICINE

## 2023-08-04 PROCEDURE — 25010000002 METRONIDAZOLE 500 MG/100ML SOLUTION: Performed by: INTERNAL MEDICINE

## 2023-08-04 PROCEDURE — 51702 INSERT TEMP BLADDER CATH: CPT

## 2023-08-04 PROCEDURE — G0378 HOSPITAL OBSERVATION PER HR: HCPCS

## 2023-08-04 PROCEDURE — 63710000001 PANTOPRAZOLE 40 MG TABLET DELAYED-RELEASE: Performed by: INTERNAL MEDICINE

## 2023-08-04 PROCEDURE — 63710000001 POLYETHYLENE GLYCOL 17 G PACK: Performed by: INTERNAL MEDICINE

## 2023-08-04 PROCEDURE — 63710000001 METOPROLOL TARTRATE 50 MG TABLET: Performed by: INTERNAL MEDICINE

## 2023-08-04 PROCEDURE — 63710000001 SENNOSIDES-DOCUSATE 8.6-50 MG TABLET: Performed by: INTERNAL MEDICINE

## 2023-08-04 PROCEDURE — 63710000001 PAROXETINE 20 MG TABLET: Performed by: INTERNAL MEDICINE

## 2023-08-04 PROCEDURE — 63710000001 TAMSULOSIN 0.4 MG CAPSULE: Performed by: INTERNAL MEDICINE

## 2023-08-04 PROCEDURE — 94664 DEMO&/EVAL PT USE INHALER: CPT

## 2023-08-04 RX ORDER — ALBUTEROL SULFATE 90 UG/1
1 AEROSOL, METERED RESPIRATORY (INHALATION) EVERY 4 HOURS PRN
Start: 2023-08-04

## 2023-08-04 RX ORDER — CEFDINIR 300 MG/1
300 CAPSULE ORAL 2 TIMES DAILY
Qty: 2 CAPSULE | Refills: 0 | Status: SHIPPED | OUTPATIENT
Start: 2023-08-04 | End: 2023-08-10 | Stop reason: HOSPADM

## 2023-08-04 RX ORDER — BISACODYL 5 MG/1
5 TABLET, DELAYED RELEASE ORAL DAILY
Qty: 30 TABLET | Refills: 0 | Status: SHIPPED | OUTPATIENT
Start: 2023-08-04

## 2023-08-04 RX ORDER — METRONIDAZOLE 500 MG/1
500 TABLET ORAL 3 TIMES DAILY
Qty: 6 TABLET | Refills: 0 | Status: SHIPPED | OUTPATIENT
Start: 2023-08-04 | End: 2023-08-10 | Stop reason: HOSPADM

## 2023-08-04 RX ORDER — POLYETHYLENE GLYCOL 3350 17 G/17G
17 POWDER, FOR SOLUTION ORAL DAILY
Qty: 30 EACH | Refills: 0 | Status: SHIPPED | OUTPATIENT
Start: 2023-08-04

## 2023-08-04 RX ORDER — TAMSULOSIN HYDROCHLORIDE 0.4 MG/1
0.4 CAPSULE ORAL DAILY
Qty: 30 CAPSULE | Refills: 0 | Status: SHIPPED | OUTPATIENT
Start: 2023-08-04

## 2023-08-04 RX ORDER — AMOXICILLIN 250 MG
2 CAPSULE ORAL 2 TIMES DAILY
Qty: 120 TABLET | Refills: 0 | Status: SHIPPED | OUTPATIENT
Start: 2023-08-04

## 2023-08-04 RX ADMIN — SENNOSIDES AND DOCUSATE SODIUM 2 TABLET: 50; 8.6 TABLET ORAL at 10:01

## 2023-08-04 RX ADMIN — Medication 10 ML: at 10:01

## 2023-08-04 RX ADMIN — TAMSULOSIN HYDROCHLORIDE 0.4 MG: 0.4 CAPSULE ORAL at 10:01

## 2023-08-04 RX ADMIN — BISACODYL 5 MG: 5 TABLET ORAL at 10:01

## 2023-08-04 RX ADMIN — PANTOPRAZOLE SODIUM 40 MG: 40 TABLET, DELAYED RELEASE ORAL at 06:32

## 2023-08-04 RX ADMIN — TIOTROPIUM BROMIDE INHALATION SPRAY 2 PUFF: 3.12 SPRAY, METERED RESPIRATORY (INHALATION) at 07:33

## 2023-08-04 RX ADMIN — APIXABAN 5 MG: 5 TABLET, FILM COATED ORAL at 10:01

## 2023-08-04 RX ADMIN — PAROXETINE HYDROCHLORIDE HEMIHYDRATE 40 MG: 20 TABLET, FILM COATED ORAL at 10:01

## 2023-08-04 RX ADMIN — METOPROLOL TARTRATE 50 MG: 50 TABLET, FILM COATED ORAL at 10:01

## 2023-08-04 RX ADMIN — METRONIDAZOLE 500 MG: 500 INJECTION, SOLUTION INTRAVENOUS at 06:32

## 2023-08-04 RX ADMIN — CEFTRIAXONE SODIUM 1000 MG: 1 INJECTION, POWDER, FOR SOLUTION INTRAMUSCULAR; INTRAVENOUS at 00:03

## 2023-08-04 RX ADMIN — POLYETHYLENE GLYCOL 3350 17 G: 17 POWDER, FOR SOLUTION ORAL at 10:01

## 2023-08-04 NOTE — PROGRESS NOTES
[facility] will follow post hospital as requested. Patient/spouse agreeable to services. Contact information and PCP confirmed. Best to call spouse at [phone] to schedule home health  Visits. Verbal order received from Paula with PCP, Dr. Brandin Bolton, for home health care.

## 2023-08-04 NOTE — DISCHARGE SUMMARY
Patient Name: Jose Hurtado  : 1950  MRN: [id number]    Date of Admission: 2023  Date of Discharge:  2023  Primary Care Physician: Brandin Bolton MD      Chief Complaint:   Nausea      Discharge Diagnoses     Active Hospital Problems    Diagnosis  POA    **Proctitis [K62.89]  Yes    Severe malnutrition [E43]  Yes    Nausea and vomiting [R11.2]  Yes    Acute urinary retention [R33.8]  Yes    Chronic obstructive pulmonary disease [J44.9]  Yes    Atrial fibrillation [I48.91]  Yes    CKD (chronic kidney disease) stage 2, GFR 60-89 ml/min [N18.2]  Yes    History of esophageal cancer [Z85.01]  Yes    Iron deficiency [E61.1]  Yes    Lymphedema [I89.0]  Yes    Peripheral polyneuropathy [G62.9]  Yes    Anemia [D64.9]  Yes    Hypertension [I10]  Yes    Malignant neoplasm of prostate [C61]  Yes      Resolved Hospital Problems   No resolved problems to display.        Hospital Course     Mr. Hurtado is a 73 y.o. male with a history of antiphospholipid syndrome on chronic anticoagulation, atrial fibrillation, hypertension, hearing loss, lower extremity lymphedema, prior DVT, esophageal carcinoma who presented to [facility] initially complaining of abdominal pain.  Please see the admitting history and physical for further details.  He was admitted to the hospital for further evaluation and treatment.   Following admission, gastroenterology was consulted and followed patient during his hospital stay.  Initial imaging with CT abdomen pelvis showed rectal wall thickening with surrounding stranding/inflammation consistent with focal colitis/proctitis, moderate distention of the urinary bladder with hydroureter and mild hydronephrosis and chronic right basal pleural effusion that was found to be similar from prior exam in 2023.  He was treated with antibiotics for intra-abdominal infection, GI performed flexible sigmoidoscopy on , which revealed 2 small ulcers in the rectum and in the  rectosigmoid colon, biopsy contraindicated, also showed nonbleeding internal hemorrhoids.  Additionally, urology was consulted due to patient having acute urinary retention, King catheter was placed.  Voiding trial was performed prior to discharge, however, on PVR assessment patient was found to have greater than 500 cc and per urology recommendations, King catheter had to be placed in again prior to discharge.  Prior to discharge, gastroenterology evaluated, they recommended continuation of bowel regimen and instructed patient to closely monitor his stools and ensure he is having regular daily bowel movements, also, recommended resuming home Eliquis as previously prescribed.  They subsequently signed off as no further acute intervention was warranted at this time.  Urology evaluated prior to discharge, voiding trial was performed, as discussed above, however, King catheter had to be placed and prior to discharge, which she will be going home with.  Per their recommendations, patient will need to have a follow-up appointment with them in the office after discharge for reassessment, they provided a number and instructed patient to call to set up appointment, which was given to him prior to discharge.  At time of discharge, patient was hemodynamically stable and cleared for discharge by consultants.  Plan to continue antibiotics with cefdinir for 1 more day and metronidazole for 2 more days to complete 7-day antibiotic course in the setting of intra-abdominal infection.  Bowel regimen ordered and prescribed at discharge per gastroenterology recommendations and will continue PPI as previously prescribed.  Continue tamsulosin as prescribed, King catheter.  Resume home medications as previously prescribed for chronic medical conditions.   Recommend that patient check his blood pressure 2-3 times daily and record those values in log book and take with him to next PCP visit to allow for greater insight into treatment  efficacy to guide further management decisions. Recommend heart healthy/low sodium diet.  Recommend close follow-up with gastroenterology and urology within 1 week after discharge or as scheduled for reassessment to guide ongoing management decisions. Recommend close follow up with PCP within 1 week after discharge for reassessment to guide ongoing management decisions. Recommend repeat CMP and CBC with PCP within 1 week for reassessment.    Day of Discharge     Subjective:  Patient seen and examined this morning.  Hospital day 6.  Discussed with nursing, no acute events overnight.  King catheter removed on 08/03 in the afternoon, patient was subsequently able to void, however, PVR was found to be greater than 500, so King catheter had to be placed again prior to discharge, per urology recommendations.  Patient has been cleared for discharge with plans for outpatient follow-up.    Physical Exam:  Temp:  [97.5 øF (36.4 øC)-99 øF (37.2 øC)] 97.5 øF (36.4 øC)  Heart Rate:  [46-63] 52  Resp:  [14-16] 14  BP: (113-125)/(59-72) 114/72  Body mass index is 22.53 kg/mý.  Const: Elderly/frail appearing, chronically ill-appearing, no acute distress  HEENT: Normal conjunctival, no scleral icterus  CV: Regular rate, regular rhythm  RESP: FIO2 21, clear to auscultation B/L, normal effort  GI/: soft, non-tender, non-distended, Kign catheter  MSK: No gross deformities appreciated, no tenderness  Skin: Warm, dry, pale  Psych: Appropriate mood and affect.    Consultants     Consult Orders (all) (From admission, onward)       Start     Ordered    08/03/23 1229  Inpatient Hospitalist Consult  Once        Specialty:  Hospitalist  Provider:  Fernando Fuchs MD    08/03/23 1229    07/30/23 0515  Inpatient Urology Consult  Once        Specialty:  Urology  Provider:  Eric Hogan MD    07/30/23 0514    07/29/23 1848  Inpatient Gastroenterology Consult  Once        Specialty:  Gastroenterology  Provider:  Jameel Valencia MD     07/29/23 1847    07/29/23 1618  Inpatient Nutrition Consult  Once        Provider:  (Not yet assigned)    07/29/23 1618    07/29/23 1419  [facility] (on-call MD unless specified) Details  Once        Specialty:  Hospitalist  Provider:  Jose Osborne MD    07/29/23 1418                  Procedures     SIGMOIDOSCOPY FLEXIBLE    Imaging Results (All)       Procedure Component Value Units Date/Time    CT Abdomen Pelvis With Contrast [[id number]] Collected: 07/29/23 1340     Updated: 07/29/23 1402    Narrative:      CT ABDOMEN AND PELVIS WITH IV CONTRAST     HISTORY: Epigastric pain with nausea and vomiting for approximately 4  days.     TECHNIQUE:  CT includes axial imaging from the lung bases to the  trochanters with intravenous contrast and without use of oral contrast.  Data reconstructed in coronal and sagittal planes. Radiation dose  reduction techniques were utilized, including automated exposure control  and exposure modulation based on body size.     COMPARISON: CT abdomen 03/29/2023.     FINDINGS: There has been esophagectomy with gastric pull-through and  there is compressive atelectasis in the right lower lobe. There is a  small loculated right pleural effusion with surrounding pleural  thickening and this appears similar to the prior exam 03/29/2023. Left  lung base appears clear. There is elevation of the right hemidiaphragm.     The liver, spleen, and pancreas appear within normal limits. There is  mild left adrenal gland thickening and this is without change. The right  adrenal gland appears within normal limits. Mild bilateral renal  parenchymal thinning is present, greater on the left. There is mild  bilateral hydronephrosis with dilatation of the extrarenal pelvises and  this is new when compared to the exam 03/29/2023. Mild hydroureter is  present. There are 3 mm bilateral lower pole nonobstructing renal stones  and there is a 2-3 mm right upper pole renal nonobstructing stone. No  evidence for obstructing  stone. Moderate urinary bladder distention is  present.     There is no bowel dilatation or evidence for bowel obstruction. There is  wall thickening involving the rectum with perirectal stranding  consistent with colitis/proctitis.     There is mild generalized stranding within the abdominal mesentery and  there is mild body wall edema. Degenerative disc disease is present in  the lumbar spine, greatest at L2-3 and L5-S1 where there is vacuum  phenomena.       Impression:      1. Rectal wall thickening with surrounding stranding/inflammation  consistent with focal colitis/proctitis.  2. Moderate distention of the urinary bladder. There is also hydroureter  and mild hydronephrosis with distention of the renal pelvises without  evidence for obstructing stone. Small nonobstructing renal stones are  present.  3. Previous esophagectomy with gastric pull-through. Chronic loculated  right basal pleural effusion with surrounding pleural thickening,  similar to prior exam 03/29/2023.     Radiation dose reduction techniques were utilized, including automated  exposure control and exposure modulation based on body size.     This report was finalized on 7/29/2023 1:59 PM by Dr. Andrade Boo M.D.       XR Chest 1 View [[id number]] Collected: 07/29/23 1231     Updated: 07/29/23 1238    Narrative:      XR CHEST 1 VW-     HISTORY: Male who is 73 years-old,  nausea, vomiting, prior  esophagectomy     TECHNIQUE: Frontal views of the chest     COMPARISON: 01/02/2023     FINDINGS: The heart size is normal. Pulmonary vasculature is  unremarkable. Aorta is tortuous. Gastric pull-through procedure changes  are apparent. No focal pulmonary consolidation. Minimal right pleural  effusion is seen. No pneumothorax. Old right rib deformities are noted.  No acute osseous process.       Impression:      No focal pulmonary consolidation. Minimal right pleural  effusion.     This report was finalized on 7/29/2023 12:35 PM by Dr. Julio  OSIEL Boyer M.D.             Results for orders placed in visit on 10/10/22    Duplex Venous Lower Extremity - Left CAR    Interpretation Summary    Chronic left lower extremity deep vein thrombosis noted in the popliteal and gastrocnemius.    All other left sided veins appeared normal.    Results for orders placed during the hospital encounter of 05/12/22    Adult Transthoracic Echo Complete w/ Color, Spectral and Contrast if necessary per protocol    Interpretation Summary  ú Estimated right ventricular systolic pressure from tricuspid regurgitation is mildly elevated (35-45 mmHg). Calculated right ventricular systolic pressure from tricuspid regurgitation is 43 mmHg.  ú Left ventricular wall thickness is consistent with mild concentric hypertrophy.  ú Estimated left ventricular EF = 56% Left ventricular systolic function is normal.  ú Left ventricular diastolic function was normal.    Pertinent Labs     Results from last 7 days   Lab Units 08/03/23  0716 07/30/23  0827 07/29/23  1137   WBC 10*3/mm3 4.59 2.58* 3.19*   HEMOGLOBIN g/dL 10.6* 10.6* 11.6*   PLATELETS 10*3/mm3 154 157 194     Results from last 7 days   Lab Units 07/30/23  0827 07/29/23  1137   SODIUM mmol/L 140 139   POTASSIUM mmol/L 4.2 4.3   CHLORIDE mmol/L 107 103   CO2 mmol/L 24.6 25.0   BUN mg/dL 15 18   CREATININE mg/dL 1.03 1.17   GLUCOSE mg/dL 84 136*   EGFR mL/min/1.73 75.8 65.0     Results from last 7 days   Lab Units 07/30/23  0827 07/29/23  1137   ALBUMIN g/dL 3.1* 4.3   BILIRUBIN mg/dL 0.3 0.4   ALK PHOS U/L 78 109   AST (SGOT) U/L 18 20   ALT (SGPT) U/L 13 15     Results from last 7 days   Lab Units 07/30/23  0827 07/29/23  1137   CALCIUM mg/dL 8.4* 9.1   ALBUMIN g/dL 3.1* 4.3     Results from last 7 days   Lab Units 07/29/23  1137   LIPASE U/L 25     Results from last 7 days   Lab Units 07/29/23  1354 07/29/23  1137   HSTROP T ng/L 61* 59*           Invalid input(s): LDLCALC          Test Results Pending at Discharge       Discharge  Details        Discharge Medications        New Medications        Instructions Start Date   bisacodyl 5 MG EC tablet  Commonly known as: DULCOLAX   5 mg, Oral, Daily      cefdinir 300 MG capsule  Commonly known as: OMNICEF   300 mg, Oral, 2 Times Daily      metroNIDAZOLE 500 MG tablet  Commonly known as: Flagyl   500 mg, Oral, 3 Times Daily      polyethylene glycol 17 g packet  Commonly known as: MIRALAX   17 g, Oral, Daily      sennosides-docusate 8.6-50 MG per tablet  Commonly known as: PERICOLACE   2 tablets, Oral, 2 Times Daily      tamsulosin 0.4 MG capsule 24 hr capsule  Commonly known as: FLOMAX   0.4 mg, Oral, Daily             Continue These Medications        Instructions Start Date   albuterol sulfate  (90 Base) MCG/ACT inhaler  Commonly known as: PROVENTIL HFA;VENTOLIN HFA;PROAIR HFA   1 puff, Inhalation, Every 4 Hours PRN      atorvastatin 40 MG tablet  Commonly known as: LIPITOR   40 mg, Oral, Nightly      cyanocobalamin 1000 MCG/ML injection   INJECT 1 ML INTRAMUSCULARLY ONCE EVERY 30 DAYS AS DIRECTED      Eliquis 5 MG tablet tablet  Generic drug: apixaban   TAKE ONE TABLET BY MOUTH EVERY 12 HOURS      furosemide 40 MG tablet  Commonly known as: LASIX   40 mg, Oral, 2 Times Daily      Gemtesa 75 MG tablet  Generic drug: Vibegron   Daily      metoprolol tartrate 50 MG tablet  Commonly known as: LOPRESSOR   50 mg, Oral, 2 Times Daily      pantoprazole 40 MG EC tablet  Commonly known as: PROTONIX   TAKE ONE TABLET BY MOUTH TWICE A DAY BEFORE A MEAL      PARoxetine 40 MG tablet  Commonly known as: PAXIL   TAKE ONE TABLET BY MOUTH EVERY MORNING      potassium chloride 10 MEQ CR capsule  Commonly known as: MICRO-K   10 mEq, Oral, Daily      pramipexole 1.5 MG tablet  Commonly known as: MIRAPEX   TAKE ONE TABLET BY MOUTH TWICE A DAY      Spiriva Respimat 2.5 MCG/ACT aerosol solution inhaler  Generic drug: tiotropium bromide monohydrate   2 puffs, Inhalation, Daily      Stiolto Respimat 2.5-2.5  MCG/ACT aerosol solution inhaler  Generic drug: tiotropium bromide-olodaterol   Inhalation, Daily - RT               No Known Allergies    Discharge Disposition:  Home or Self Care      Discharge Diet:  Diet Order   Procedures    Diet: Cardiac Diets; Healthy Heart (2-3 Na+); Texture: Regular Texture (IDDSI 7); Fluid Consistency: Thin (IDDSI 0)       Discharge Activity:   Activity Instructions       Gradually Increase Activity Until at Pre-Hospitalization Level      Up WIth Assist              CODE STATUS:    Code Status and Medical Interventions:   Ordered at: 07/29/23 5470     Code Status (Patient has no pulse and is not breathing):    CPR (Attempt to Resuscitate)     Medical Interventions (Patient has pulse or is breathing):    Full Support     Release to patient:    Routine Release       Future Appointments   Date Time Provider Department Center   8/8/2023  1:30 PM Mervat Ramachandran, PT [facility]   8/11/2023  1:30 PM Mervat Ramachandran, PT [facility]   8/15/2023  1:30 PM Mervat Ramachandran, PT [facility]   8/15/2023  1:40 PM LAB CHAIR 1 [facility]   8/15/2023  2:00 PM George Phelan II, MD [facility]   8/17/2023  1:30 PM Mervat Ramachandran, PT [facility]   8/21/2023  1:30 PM Mervat Ramachandran, PT [facility]   8/25/2023  1:30 PM Mervat Ramachandran, PT [facility]   8/28/2023  1:30 PM Mervat Ramachandran, PT [facility]   9/1/2023 10:30 AM Mervat Ramachandran, PT [facility]   9/5/2023  1:30 PM Mervat Ramachandran, PT [facility]   9/8/2023  1:30 PM Mervat Ramachandran, PT [facility]   9/11/2023  1:30 PM Mervat Ramachandran, PT [facility]   9/13/2023  1:30 PM Mervat Ramachandran, PT [facility]   9/15/2023  1:30 PM Mervat Ramachandran, PT [facility]   9/18/2023  1:30 PM Yumi Grider APRN [facility]   9/19/2023  1:30 PM Mervat Ramachandran, PT [facility]   9/21/2023  1:30 PM Mervat Ramachandran, PT [facility]   9/25/2023  1:30 PM Mervat Ramachandran, PT [facility]  [facility]   9/27/2023  1:30 PM Mervat Ramachandran, PT [facility]   9/27/2023  3:00 PM Brandin Bolton MD [facility]   3/25/2024 12:30 PM Darian Maldonado Jr., MD [facility]   4/4/2024 10:30 AM [facility] CT 3 BH [facility]   4/17/2024  1:00 PM Verito Julio, APRN [facility]     Additional Instructions for the Follow-ups that You Need to Schedule       Discharge Follow-up with PCP   As directed       Currently Documented PCP:    Brandin Bolton MD    PCP Phone Number:    [phone]     Follow Up Details: Within 1 week after discharge for reassessment, medication and symptom review, blood pressure check, CMP and CBC        Discharge Follow-up with Specialty: Urology, gastroenterology   As directed      Specialty: Urology, gastroenterology   Follow Up Details: Please follow-up with the specialty services within 1 week after discharge or as scheduled for reassessment.  Please call their offices to ensure that a follow-up appointment is made for you.               Follow-up Information       Brandin Bolton MD .    Specialty: Internal Medicine  Why: Within 1 week after discharge for reassessment, medication and symptom review, blood pressure check, CMP and CBC  Contact information:  [address] [place] [address]  [phone]                             Additional Instructions for the Follow-ups that You Need to Schedule       Discharge Follow-up with PCP   As directed       Currently Documented PCP:    Brandin Bolton MD    PCP Phone Number:    [phone]     Follow Up Details: Within 1 week after discharge for reassessment, medication and symptom review, blood pressure check, CMP and CBC        Discharge Follow-up with Specialty: Urology, gastroenterology   As directed      Specialty: Urology, gastroenterology   Follow Up Details: Please follow-up with the specialty services within 1 week after discharge or as scheduled for reassessment.  Please call their offices to ensure that a follow-up  appointment is made for you.            Time Spent on Discharge:  Greater than 30 minutes      Nitesh Oglesby DO  [facility]  08/04/23  08:42 EDT

## 2023-08-04 NOTE — PLAN OF CARE
Goal Outcome Evaluation:  Plan of Care Reviewed With: (P) patient        Progress: (P) improving  Outcome Evaluation: (P) Pt seen for PT treatment today. Pt in bed upon arrival. SBA for all bed mobility. CGAx2 with the use of RW while ambulating 360'. Pt demonstrates forward flexed posture that he self corrects during ambulation. Pt has decerased gait speed,  narrow base of support, decreased step length,and increased knee flexion on L >R. No overt LOB during today's session, but mild unsteadiness noted. Pt will continue to benefit from skilled PT interventions to address overall endurance, balance, and strength. Rec home with assist at d/c.      Anticipated Discharge Disposition (PT): (P) home, home with assist

## 2023-08-04 NOTE — DISCHARGE INSTRUCTIONS
Follow up with Dr. Montoya, [facility], Call to schedule appointment [phone]

## 2023-08-04 NOTE — CASE MANAGEMENT/SOCIAL WORK
Case Management Discharge Note      Final Note: Spoke with [facility]/ [facility] at home. Stated he will contact [facility] which is their [facility] PT services and forward his information.         Selected Continued Care - Discharged on 8/4/2023 Admission date: 7/29/2023 - Discharge disposition: Home or Self Care      Destination    No services have been selected for the patient.                Durable Medical Equipment    No services have been selected for the patient.                Dialysis/Infusion    No services have been selected for the patient.                Home Medical Care    No services have been selected for the patient.                Therapy    No services have been selected for the patient.                Community Resources    No services have been selected for the patient.                Community & DME    No services have been selected for the patient.                         Final Discharge Disposition Code: 06 - home with home health care

## 2023-08-04 NOTE — THERAPY TREATMENT NOTE
Patient Name: Jose Hurtado  : 1950    MRN: [id number]                              Today's Date: 2023       Admit Date: 2023    Visit Dx:     ICD-10-CM ICD-9-CM   1. Colitis  K52.9 558.9   2. Proctitis  K62.89 569.49   3. Bladder distention  N32.89 596.89   4. Nausea and vomiting, unspecified vomiting type  R11.2 787.01   5. History of esophagectomy  Z98.890 V15.29    Z90.49    6. Abnormal CT scan  R93.89 793.99   7. Malignant neoplasm of prostate  C61 185   8. History of esophageal cancer  Z85.01 V10.03     Patient Active Problem List   Diagnosis    History of esophageal cancer    Adenomatous polyp of colon    Malignant neoplasm of prostate    RLS (restless legs syndrome)    History of DVT (deep vein thrombosis)    Recurrent major depressive disorder, in partial remission    Hypertension    Anemia    Insomnia due to other mental disorder    Peripheral polyneuropathy    B12 deficiency    Lymphedema    Iron deficiency    Gastroparesis    History of recurrent deep vein thrombosis (DVT)    Tremor of both hands    Gastrointestinal hemorrhage    Acute blood loss anemia    Pancytopenia    Chronic venous hypertension due to DVT    Bleeding hemorrhoid    Malabsorption of iron    Iron deficiency anemia    History of esophageal cancer    Abnormal CT scan    Generalized weakness    Chronic anticoagulation    CKD (chronic kidney disease) stage 2, GFR 60-89 ml/min    Dysphagia    NSTEMI (non-ST elevated myocardial infarction)    Bronchitis    Dyspnea    Elevated troponin    Atrial fibrillation    Chronic obstructive pulmonary disease    Congestive heart failure    Gait instability    Dark stools    Proctitis    Nausea and vomiting    Acute urinary retention    Severe malnutrition     Past Medical History:   Diagnosis Date    Acute deep vein thrombosis (DVT) of popliteal vein of left lower extremity 2020    Anemia     Anti-phospholipid syndrome     On lifelong anticoagulation therapy    Bladder disorder      LEAKAGE   ON MED  wers pads    Bleeding hemorrhoids     Chronic kidney disease     Community acquired pneumonia     HISTORY OF IN 2014    Congestive heart failure     COPD (chronic obstructive pulmonary disease)     Deep venous thrombosis 2006, 2008    Left lower extremity multiple    Depression     Esophageal carcinoma 12/31/2014    had chemo and radiation prior surgery    Hemorrhoids     HH (hiatus hernia)     History of atrial fibrillation 2015    ONE EPISODE WHILE HOSPITALIZED    History of kidney stones     History of nephrolithiasis     History of pancreatitis     PT STATES MANY YEARS AGO    History of radiation therapy     History of transfusion     Hypertension     Long-term (current) use of anticoagulants, INR goal 2.0-3.0     Lymphedema     Malignant neoplasm of prostate     Other hyperlipidemia 01/30/2018 January 30, 2018 lipid panel risk 12.8%    Restless legs syndrome     Sleep apnea     OCCASIONALLY WEARS CPAP    Squamous carcinoma     on the head     Past Surgical History:   Procedure Laterality Date    APPENDECTOMY  1950    BRONCHOSCOPY      (Diagnostic)    CARDIAC CATHETERIZATION N/A 5/14/2022    Procedure: Left Heart Cath;  Surgeon: Eric So MD;  Location:  [facility];  Service: Cardiology;  Laterality: N/A;    CARDIAC CATHETERIZATION N/A 5/14/2022    Procedure: Coronary angiography;  Surgeon: Eric So MD;  Location:  [facility];  Service: Cardiology;  Laterality: N/A;    CARDIAC CATHETERIZATION N/A 5/14/2022    Procedure: Left ventriculography;  Surgeon: Eric So MD;  Location:  [facility];  Service: Cardiology;  Laterality: N/A;    CATARACT EXTRACTION Bilateral 2014    COLONOSCOPY  12/15/2014    Complete / Description: EH, IH, torts, stool, follow-up colonoscopy due in 5 years.    COLONOSCOPY N/A 6/13/2017    non-thrombosed external hemorrhoids, normal examined ileum, IH    COLONOSCOPY N/A  2/26/2019    Procedure: COLONOSCOPY with Cold Polypectomy;  Surgeon: Mulugeta Millan MD;  Location: [facility] ENDOSCOPY;  Service: Gastroenterology    COLONOSCOPY N/A 12/16/2020    Procedure: COLONOSCOPY to cecum into TI;  Surgeon: Mesha Sanchez MD;  Location[facility] END[facility]COPY;  Service: Gastroenterology;  Laterality: N/A;  pre: lower GI bleed  post: hemorrhoids     CYSTOSCOPY W/ LASER LITHOTRIPSY      ENDOSCOPY N/A 6/13/2017    Procedure: ESOPHAGOGASTRODUODENOSCOPY WITH COLD BIOPSY;  Surgeon: Lake Gonzalez MD;  Location: [facility] ENDOSCOPY;  Service:     ENDOSCOPY N/A 11/18/2021    Procedure: ESOPHAGOGASTRODUODENOSCOPY WITH  DILATATION WITH FLUOROSCOPY;  Surgeon: Jose Christine III, MD;  Location: [facility] ENDOSCOPY;  Service: Gastroenterology;  Laterality: N/A;  Pre: dysphagia, h/x of adinocarcinoma of esophagus  Post: same    ESOPHAGECTOMY      April 2015, stage IIB esophageal carcinoma, sub-total resection.    ESOPHAGECTOMY      Esophagectomy Subtotal [place] Joe Procedure    EXCISION LESION  08/2012    Removal of Squamous Cell CA on Head    HAMMER TOE REPAIR  09/2014    Hammertoe Operation (Each Toe), 10/2014    HAMMER TOE REPAIR Left 10/3/2017    Procedure: Left second third and fourth distal interphalangeal joint resection with flexor tenotomy;  Surgeon: Mulugeta Lira MD;  Location: [facility] OR OSC;  Service:     HEMORRHOIDECTOMY N/A 12/23/2020    Procedure: HEMORRHOIDECTOMY;  Surgeon: Billy Julio Jr., MD;  Location: [facility] MAIN OR;  Service: General;  Laterality: N/A;    HERNIA REPAIR      incisional    JEJUNOSTOMY      Laparoscopic    JEJUNOSTOMY      tube removal     KNEE SURGERY Bilateral 1967, 1973, 1981    PATELLA SURGERY Left     removed    PILONIDAL CYST / SINUS EXCISION      SC ARTHRP KNE CONDYLE&PLATU MEDIAL&LAT COMPARTMENTS Right 3/26/2018    Procedure: TOTAL KNEE ARTHROPLASTY;  Surgeon: Renny Solis MD;  Location: [facility] MAIN OR;  Service: Orthopedics    PROSTATECTOMY  2010     PYLOROPLASTY      SIGMOIDOSCOPY N/A 8/2/2023    Procedure: SIGMOIDOSCOPY FLEXIBLE;  Surgeon: Mesha Sanchez MD;  Location: [facility] ENDOSCOPY;  Service: Gastroenterology;  Laterality: N/A;  PRE- ABNORMAL CT  POST- HEMORRHOIDS, ULCERATION    SPINAL FUSION  02/1998    C 5,6    TONSILLECTOMY      UPPER GASTROINTESTINAL ENDOSCOPY  12/15/2014    LA Grade D esophagitis, Pardo's, HH, multiple duodenal ulcers    VENTRAL/INCISIONAL HERNIA REPAIR N/A 4/14/2016    Procedure: VENTRAL/INCISIONAL HERNIA REPAIR, open, with mesh, and component separation;  Surgeon: Darren Rivas MD;  Location: [facility] MAIN OR;  Service:       General Information       Row Name 08/04/23 0933          Physical Therapy Time and Intention    Document Type therapy note (daily note) (P)   -SK     Mode of Treatment individual therapy;physical therapy (P)   -SK       Row Name 08/04/23 0933          General Information    Patient Profile Reviewed yes (P)   -SK     Prior Level of Function independent: (P)   -SK     Existing Precautions/Restrictions fall (P)   -SK     Barriers to Rehab hearing deficit (P)   -SK       Row Name 08/04/23 0933          Living Environment    People in Home spouse (P)   -SK       Row Name 08/04/23 0933          Cognition    Orientation Status (Cognition) oriented to;person;place (P)   -SK       Row Name 08/04/23 0933          Safety Issues, Functional Mobility    Safety Issues Affecting Function (Mobility) awareness of need for assistance (P)   -SK     Impairments Affecting Function (Mobility) balance;endurance/activity tolerance;postural/trunk control;strength (P)   -SK     Comment, Safety Issues/Impairments (Mobility) gait belt and nonskid socks donned; pt wore shoes during ambulation (P)   -SK               User Key  (r) = Recorded By, (t) = Taken By, (c) = Cosigned By      Initials Name Provider Type    SK Yolanda Mahan PT Student PT Student                   Mobility       Row Name 08/04/23 0935          Bed  Mobility    Bed Mobility bed mobility (all) activities (P)   -SK     All Activities, Roberts (Bed Mobility) standby assist (P)   -SK     Supine-Sit Roberts (Bed Mobility) standby assist;verbal cues (P)   -SK     Sit-Supine Roberts (Bed Mobility) standby assist;verbal cues (P)   -SK     Assistive Device (Bed Mobility) bed rails (P)   -SK       Row Name 08/04/23 0935          Sit-Stand Transfer    Sit-Stand Roberts (Transfers) contact guard;minimum assist (75% patient effort);other (see comments) (P)   lower height of bed noted; made STS more challenging.  -SK     Assistive Device (Sit-Stand Transfers) walker, front-wheeled (P)   -SK       Row Name 08/04/23 0935          Gait/Stairs (Locomotion)    Roberts Level (Gait) contact guard;verbal cues (P)   -SK     Assistive Device (Gait) walker, front-wheeled (P)   -SK     Distance in Feet (Gait) 360' (P)   -SK     Deviations/Abnormal Patterns (Gait) bilateral deviations;base of support, narrow;gait speed decreased;scissoring;stride length decreased (P)   -SK     Bilateral Gait Deviations forward flexed posture;heel strike decreased (P)   -SK     Left Sided Gait Deviations heel strike decreased;decreased knee extension (P)   -SK     Right Sided Gait Deviations decreased knee extension (P)   -SK     Comment, (Gait/Stairs) Pt CGAx2 using RW while ambuating 360'. (P)   -SK               User Key  (r) = Recorded By, (t) = Taken By, (c) = Cosigned By      Initials Name Provider Type    Yolanda Cao, PT Student PT Student                   Obj/Interventions       Row Name 08/04/23 0939          Balance    Position/Device Used, Standing Balance supported;walker, front-wheeled (P)   -SK               User Key  (r) = Recorded By, (t) = Taken By, (c) = Cosigned By      Initials Name Provider Type    Yolanda Cao, PT Student PT Student                   Goals/Plan    No documentation.                  Clinical Impression       Row Name 08/04/23 0939           Pain    Pre/Posttreatment Pain Comment pt did not rate pain today. (P)   -SK       Row Name 08/04/23 0940          Plan of Care Review    Plan of Care Reviewed With patient (P)   -SK     Progress improving (P)   -SK     Outcome Evaluation Pt seen for PT treatment today. Pt in bed upon arrival. SBA for all bed mobility. CGAx2 with the use of RW while ambulating 360'. Pt demonstrates forward flexed posture that he self corrects during ambulation. Pt has decerased gait speed,  narrow base of support, decreased step length,and increased knee flexion on L >R. No overt LOB during today's session, but mild unsteadiness noted. Pt will continue to benefit from skilled PT interventions to address overall endurance, balance, and strength. Rec home with assist at d/c. (P)   -SK       Row Name 08/04/23 0940          Therapy Assessment/Plan (PT)    Rehab Potential (PT) good, to achieve stated therapy goals (P)   -SK     Therapy Frequency (PT) 3 times/wk (P)   -SK       Row Name 08/04/23 7640          Positioning and Restraints    Pre-Treatment Position in bed (P)   -SK     Post Treatment Position bed (P)   -SK     In Bed notified nsg;supine;call light within reach;encouraged to call for assist;exit alarm on (P)   -SK               User Key  (r) = Recorded By, (t) = Taken By, (c) = Cosigned By      Initials Name Provider Type    Yolanda Cao, PT Student PT Student                   Outcome Measures       Row Name 08/04/23 0949          How much help from another person do you currently need...    Turning from your back to your side while in flat bed without using bedrails? 4 (P)   -SK     Moving from lying on back to sitting on the side of a flat bed without bedrails? 4 (P)   -SK     Moving to and from a bed to a chair (including a wheelchair)? 3 (P)   -SK     Standing up from a chair using your arms (e.g., wheelchair, bedside chair)? 3 (P)   -SK     Climbing 3-5 steps with a railing? 2 (P)   -SK     To walk in  hospital room? 3 (P)   -SK     AM-PAC 6 Clicks Score (PT) 19 (P)   -SK     Highest level of mobility 6 --> Walked 10 steps or more (P)   -SK       Row Name 08/04/23 0949          Functional Assessment    Outcome Measure Options AM-PAC 6 Clicks Basic Mobility (PT) (P)   -SK               User Key  (r) = Recorded By, (t) = Taken By, (c) = Cosigned By      Initials Name Provider Type    Yolanda Cao, PT Student PT Student                                 Physical Therapy Education       Title: PT OT SLP Therapies (Done)       Topic: Physical Therapy (Done)       Point: Mobility training (Done)       Learning Progress Summary             Patient Acceptance, E,TB, VU,NR by SK at 8/4/2023 0950    Acceptance, E,TB, VU,NR,DU by SK at 8/3/2023 1456    Acceptance, E, VU by KS at 8/1/2023 0951    Acceptance, E,TB, VU,DU by CS at 7/31/2023 1155    Acceptance, E, VU by  at 7/30/2023 1427    Acceptance, E,D,TB, VU,DU,NR by  at 7/30/2023 1225                         Point: Home exercise program (Done)       Learning Progress Summary             Patient Acceptance, E,TB, VU,NR by SK at 8/4/2023 0950    Acceptance, E,TB, VU,NR,DU by SK at 8/3/2023 1456    Acceptance, E, VU by KS at 8/1/2023 0951    Acceptance, E,TB, VU,DU by  at 7/31/2023 1155                         Point: Body mechanics (Done)       Learning Progress Summary             Patient Acceptance, E,TB, VU,NR by SK at 8/4/2023 0950    Acceptance, E,TB, VU,NR,DU by SK at 8/3/2023 1456    Acceptance, E, VU by KS at 8/1/2023 0951    Acceptance, E,TB, VU,DU by  at 7/31/2023 1155                         Point: Precautions (Done)       Learning Progress Summary             Patient Acceptance, E,TB, VU,NR by SK at 8/4/2023 0950    Acceptance, E,TB, VU,NR,DU by SK at 8/3/2023 1456    Acceptance, E, VU by KS at 8/1/2023 0951    Acceptance, E,TREVER, ZAN,DU by CS at 7/31/2023 1155                                         User Key       Initials Effective Dates Name Provider  Type Discipline     08/04/22 -  Rachel Moncada, RN Registered Nurse Nurse     05/26/20 -  Anabella Gallegos, PT Physical Therapist PT    KS 09/02/21 -  Najma Aviles, RN Registered Nurse Nurse    CS 09/22/22 -  Saumya Bonner, PT Physical Therapist PT    SK 07/28/23 -  Yolanda Mahan, PT Student PT Student PT                  PT Recommendation and Plan     Plan of Care Reviewed With: (P) patient  Progress: (P) improving  Outcome Evaluation: (P) Pt seen for PT treatment today. Pt in bed upon arrival. SBA for all bed mobility. CGAx2 with the use of RW while ambulating 360'. Pt demonstrates forward flexed posture that he self corrects during ambulation. Pt has decerased gait speed,  narrow base of support, decreased step length,and increased knee flexion on L >R. No overt LOB during today's session, but mild unsteadiness noted. Pt will continue to benefit from skilled PT interventions to address overall endurance, balance, and strength. Rec home with assist at d/c.     Time Calculation:         PT Charges       Row Name 08/04/23 0951             Time Calculation    Start Time 0854 (P)   -SK      Stop Time 0912 (P)   -SK      Time Calculation (min) 18 min (P)   -SK      PT Received On 08/04/23 (P)   -SK      PT - Next Appointment 08/07/23 (P)   -SK                User Key  (r) = Recorded By, (t) = Taken By, (c) = Cosigned By      Initials Name Provider Type    SK Yolanda Mahan, PT Student PT Student                  Therapy Charges for Today       Code Description Service Date Service Provider Modifiers Qty    84128683951 HC PT THERAPEUTIC ACT EA 15 MIN 8/3/2023 Yolanda Mahan, PT Student GP 1    97741471677 HC PT THERAPEUTIC ACT EA 15 MIN 8/4/2023 Yolanda Mahan, PT Student GP 1            PT G-Codes  Outcome Measure Options: (P) AM-PAC 6 Clicks Basic Mobility (PT)  AM-PAC 6 Clicks Score (PT): (P) 19  PT Discharge Summary  Anticipated Discharge Disposition (PT): (P) home, home with assist    Yolanda Mahan  PT Student  8/4/2023

## 2023-08-05 ENCOUNTER — HOME CARE VISIT (OUTPATIENT)
Dept: HOME HEALTH SERVICES | Facility: HOME HEALTHCARE | Age: 73
End: 2023-08-05
Payer: MEDICARE

## 2023-08-05 ENCOUNTER — HOSPITAL ENCOUNTER (INPATIENT)
Facility: HOSPITAL | Age: 73
LOS: 1 days | Discharge: HOME | DRG: 368 | End: 2023-08-10
Attending: EMERGENCY MEDICINE | Admitting: STUDENT IN AN ORGANIZED HEALTH CARE EDUCATION/TRAINING PROGRAM
Payer: MEDICARE

## 2023-08-05 ENCOUNTER — APPOINTMENT (OUTPATIENT)
Dept: GENERAL RADIOLOGY | Facility: HOSPITAL | Age: 73
DRG: 368 | End: 2023-08-05
Payer: MEDICARE

## 2023-08-05 ENCOUNTER — TRANSITIONAL CARE MANAGEMENT TELEPHONE ENCOUNTER (OUTPATIENT)
Dept: CALL CENTER | Facility: HOSPITAL | Age: 73
End: 2023-08-05
Payer: MEDICARE

## 2023-08-05 DIAGNOSIS — R13.11 ORAL PHASE DYSPHAGIA: ICD-10-CM

## 2023-08-05 DIAGNOSIS — R13.10 DYSPHAGIA, UNSPECIFIED TYPE: ICD-10-CM

## 2023-08-05 DIAGNOSIS — R63.4 WEIGHT LOSS: ICD-10-CM

## 2023-08-05 DIAGNOSIS — C61 MALIGNANT NEOPLASM OF PROSTATE: ICD-10-CM

## 2023-08-05 DIAGNOSIS — R11.0 NAUSEA: Primary | ICD-10-CM

## 2023-08-05 DIAGNOSIS — R11.2 NAUSEA AND VOMITING, UNSPECIFIED VOMITING TYPE: ICD-10-CM

## 2023-08-05 DIAGNOSIS — E86.0 DEHYDRATION: ICD-10-CM

## 2023-08-05 PROBLEM — R63.8 OTHER SYMPTOMS AND SIGNS CONCERNING FOOD AND FLUID INTAKE: Status: ACTIVE | Noted: 2023-08-05

## 2023-08-05 LAB
ALBUMIN SERPL-MCNC: 3.3 G/DL (ref 3.5–5.2)
ALBUMIN/GLOB SERPL: 1.2 G/DL
ALP SERPL-CCNC: 80 U/L (ref 39–117)
ALT SERPL W P-5'-P-CCNC: 10 U/L (ref 1–41)
ANION GAP SERPL CALCULATED.3IONS-SCNC: 6 MMOL/L (ref 5–15)
AST SERPL-CCNC: 14 U/L (ref 1–40)
BACTERIA UR QL AUTO: ABNORMAL /HPF
BASOPHILS # BLD AUTO: 0.01 10*3/MM3 (ref 0–0.2)
BASOPHILS NFR BLD AUTO: 0.3 % (ref 0–1.5)
BILIRUB SERPL-MCNC: 0.2 MG/DL (ref 0–1.2)
BILIRUB UR QL STRIP: NEGATIVE
BUN SERPL-MCNC: 20 MG/DL (ref 8–23)
BUN/CREAT SERPL: 18 (ref 7–25)
CALCIUM SPEC-SCNC: 8.7 MG/DL (ref 8.6–10.5)
CHLORIDE SERPL-SCNC: 105 MMOL/L (ref 98–107)
CLARITY UR: CLEAR
CO2 SERPL-SCNC: 24 MMOL/L (ref 22–29)
COLOR UR: YELLOW
CREAT SERPL-MCNC: 1.11 MG/DL (ref 0.76–1.27)
DEPRECATED RDW RBC AUTO: 43.4 FL (ref 37–54)
EGFRCR SERPLBLD CKD-EPI 2021: 69.2 ML/MIN/1.73
EOSINOPHIL # BLD AUTO: 0.1 10*3/MM3 (ref 0–0.4)
EOSINOPHIL NFR BLD AUTO: 2.7 % (ref 0.3–6.2)
ERYTHROCYTE [DISTWIDTH] IN BLOOD BY AUTOMATED COUNT: 13.7 % (ref 12.3–15.4)
GLOBULIN UR ELPH-MCNC: 2.7 GM/DL
GLUCOSE SERPL-MCNC: 109 MG/DL (ref 65–99)
GLUCOSE UR STRIP-MCNC: NEGATIVE MG/DL
HCT VFR BLD AUTO: 33.2 % (ref 37.5–51)
HGB BLD-MCNC: 11.1 G/DL (ref 13–17.7)
HGB UR QL STRIP.AUTO: NEGATIVE
HYALINE CASTS UR QL AUTO: ABNORMAL /LPF
IMM GRANULOCYTES # BLD AUTO: 0.01 10*3/MM3 (ref 0–0.05)
IMM GRANULOCYTES NFR BLD AUTO: 0.3 % (ref 0–0.5)
KETONES UR QL STRIP: NEGATIVE
LEUKOCYTE ESTERASE UR QL STRIP.AUTO: ABNORMAL
LYMPHOCYTES # BLD AUTO: 0.61 10*3/MM3 (ref 0.7–3.1)
LYMPHOCYTES NFR BLD AUTO: 16.5 % (ref 19.6–45.3)
MAGNESIUM SERPL-MCNC: 2.1 MG/DL (ref 1.6–2.4)
MCH RBC QN AUTO: 29.4 PG (ref 26.6–33)
MCHC RBC AUTO-ENTMCNC: 33.4 G/DL (ref 31.5–35.7)
MCV RBC AUTO: 88.1 FL (ref 79–97)
MONOCYTES # BLD AUTO: 0.36 10*3/MM3 (ref 0.1–0.9)
MONOCYTES NFR BLD AUTO: 9.8 % (ref 5–12)
NEUTROPHILS NFR BLD AUTO: 2.6 10*3/MM3 (ref 1.7–7)
NEUTROPHILS NFR BLD AUTO: 70.4 % (ref 42.7–76)
NITRITE UR QL STRIP: NEGATIVE
NRBC BLD AUTO-RTO: 0 /100 WBC (ref 0–0.2)
PH UR STRIP.AUTO: 5.5 [PH] (ref 5–8)
PHOSPHATE SERPL-MCNC: 2.3 MG/DL (ref 2.5–4.5)
PLATELET # BLD AUTO: 146 10*3/MM3 (ref 140–450)
PMV BLD AUTO: 9.3 FL (ref 6–12)
POTASSIUM SERPL-SCNC: 4.7 MMOL/L (ref 3.5–5.2)
PROT SERPL-MCNC: 6 G/DL (ref 6–8.5)
PROT UR QL STRIP: ABNORMAL
RBC # BLD AUTO: 3.77 10*6/MM3 (ref 4.14–5.8)
RBC # UR STRIP: ABNORMAL /HPF
REF LAB TEST METHOD: ABNORMAL
SODIUM SERPL-SCNC: 135 MMOL/L (ref 136–145)
SP GR UR STRIP: 1.02 (ref 1–1.03)
SQUAMOUS #/AREA URNS HPF: ABNORMAL /HPF
UROBILINOGEN UR QL STRIP: ABNORMAL
WBC # UR STRIP: ABNORMAL /HPF
WBC CLUMPS # UR AUTO: ABNORMAL /HPF
WBC NRBC COR # BLD: 3.69 10*3/MM3 (ref 3.4–10.8)

## 2023-08-05 PROCEDURE — G0378 HOSPITAL OBSERVATION PER HR: HCPCS

## 2023-08-05 PROCEDURE — 71045 X-RAY EXAM CHEST 1 VIEW: CPT

## 2023-08-05 PROCEDURE — 83735 ASSAY OF MAGNESIUM: CPT | Performed by: PHYSICIAN ASSISTANT

## 2023-08-05 PROCEDURE — 81001 URINALYSIS AUTO W/SCOPE: CPT | Performed by: PHYSICIAN ASSISTANT

## 2023-08-05 PROCEDURE — 84100 ASSAY OF PHOSPHORUS: CPT | Performed by: PHYSICIAN ASSISTANT

## 2023-08-05 PROCEDURE — 85025 COMPLETE CBC W/AUTO DIFF WBC: CPT | Performed by: PHYSICIAN ASSISTANT

## 2023-08-05 PROCEDURE — 36415 COLL VENOUS BLD VENIPUNCTURE: CPT

## 2023-08-05 PROCEDURE — P9612 CATHETERIZE FOR URINE SPEC: HCPCS

## 2023-08-05 PROCEDURE — 99285 EMERGENCY DEPT VISIT HI MDM: CPT

## 2023-08-05 PROCEDURE — 80053 COMPREHEN METABOLIC PANEL: CPT | Performed by: PHYSICIAN ASSISTANT

## 2023-08-05 RX ORDER — FUROSEMIDE 40 MG/1
40 TABLET ORAL
Status: DISCONTINUED | OUTPATIENT
Start: 2023-08-05 | End: 2023-08-10 | Stop reason: HOSPADM

## 2023-08-05 RX ORDER — ATORVASTATIN CALCIUM 20 MG/1
40 TABLET, FILM COATED ORAL NIGHTLY
Status: DISCONTINUED | OUTPATIENT
Start: 2023-08-05 | End: 2023-08-10 | Stop reason: HOSPADM

## 2023-08-05 RX ORDER — METRONIDAZOLE 500 MG/1
500 TABLET ORAL 3 TIMES DAILY
Status: COMPLETED | OUTPATIENT
Start: 2023-08-05 | End: 2023-08-06

## 2023-08-05 RX ORDER — POLYETHYLENE GLYCOL 3350 17 G/17G
17 POWDER, FOR SOLUTION ORAL DAILY PRN
Status: DISCONTINUED | OUTPATIENT
Start: 2023-08-05 | End: 2023-08-10 | Stop reason: HOSPADM

## 2023-08-05 RX ORDER — SODIUM CHLORIDE 9 MG/ML
40 INJECTION, SOLUTION INTRAVENOUS AS NEEDED
Status: DISCONTINUED | OUTPATIENT
Start: 2023-08-05 | End: 2023-08-10 | Stop reason: HOSPADM

## 2023-08-05 RX ORDER — POTASSIUM CHLORIDE 750 MG/1
10 CAPSULE, EXTENDED RELEASE ORAL DAILY
Status: DISCONTINUED | OUTPATIENT
Start: 2023-08-05 | End: 2023-08-05

## 2023-08-05 RX ORDER — PRAMIPEXOLE DIHYDROCHLORIDE 1.5 MG/1
1.5 TABLET ORAL 2 TIMES DAILY
Status: DISCONTINUED | OUTPATIENT
Start: 2023-08-05 | End: 2023-08-10 | Stop reason: HOSPADM

## 2023-08-05 RX ORDER — ACETAMINOPHEN 160 MG/5ML
650 SOLUTION ORAL EVERY 4 HOURS PRN
Status: DISCONTINUED | OUTPATIENT
Start: 2023-08-05 | End: 2023-08-10 | Stop reason: HOSPADM

## 2023-08-05 RX ORDER — ACETAMINOPHEN 650 MG/1
650 SUPPOSITORY RECTAL EVERY 4 HOURS PRN
Status: DISCONTINUED | OUTPATIENT
Start: 2023-08-05 | End: 2023-08-10 | Stop reason: HOSPADM

## 2023-08-05 RX ORDER — CEFDINIR 300 MG/1
300 CAPSULE ORAL 2 TIMES DAILY
Status: COMPLETED | OUTPATIENT
Start: 2023-08-05 | End: 2023-08-06

## 2023-08-05 RX ORDER — ONDANSETRON 2 MG/ML
4 INJECTION INTRAMUSCULAR; INTRAVENOUS EVERY 6 HOURS PRN
Status: DISCONTINUED | OUTPATIENT
Start: 2023-08-05 | End: 2023-08-10 | Stop reason: HOSPADM

## 2023-08-05 RX ORDER — POTASSIUM CHLORIDE 750 MG/1
10 TABLET, FILM COATED, EXTENDED RELEASE ORAL DAILY
Status: DISCONTINUED | OUTPATIENT
Start: 2023-08-06 | End: 2023-08-10 | Stop reason: HOSPADM

## 2023-08-05 RX ORDER — ACETAMINOPHEN 325 MG/1
650 TABLET ORAL EVERY 4 HOURS PRN
Status: DISCONTINUED | OUTPATIENT
Start: 2023-08-05 | End: 2023-08-10 | Stop reason: HOSPADM

## 2023-08-05 RX ORDER — BISACODYL 5 MG/1
5 TABLET, DELAYED RELEASE ORAL DAILY PRN
Status: DISCONTINUED | OUTPATIENT
Start: 2023-08-05 | End: 2023-08-10 | Stop reason: HOSPADM

## 2023-08-05 RX ORDER — TAMSULOSIN HYDROCHLORIDE 0.4 MG/1
0.4 CAPSULE ORAL DAILY
Status: DISCONTINUED | OUTPATIENT
Start: 2023-08-05 | End: 2023-08-10 | Stop reason: HOSPADM

## 2023-08-05 RX ORDER — PAROXETINE HYDROCHLORIDE 20 MG/1
40 TABLET, FILM COATED ORAL EVERY MORNING
Status: DISCONTINUED | OUTPATIENT
Start: 2023-08-06 | End: 2023-08-10 | Stop reason: HOSPADM

## 2023-08-05 RX ORDER — METOPROLOL TARTRATE 50 MG/1
50 TABLET, FILM COATED ORAL 2 TIMES DAILY
Status: DISCONTINUED | OUTPATIENT
Start: 2023-08-05 | End: 2023-08-10 | Stop reason: HOSPADM

## 2023-08-05 RX ORDER — SODIUM CHLORIDE 0.9 % (FLUSH) 0.9 %
10 SYRINGE (ML) INJECTION AS NEEDED
Status: DISCONTINUED | OUTPATIENT
Start: 2023-08-05 | End: 2023-08-10 | Stop reason: HOSPADM

## 2023-08-05 RX ORDER — AMOXICILLIN 250 MG
2 CAPSULE ORAL 2 TIMES DAILY
Status: DISCONTINUED | OUTPATIENT
Start: 2023-08-05 | End: 2023-08-10 | Stop reason: HOSPADM

## 2023-08-05 RX ORDER — BISACODYL 10 MG
10 SUPPOSITORY, RECTAL RECTAL DAILY PRN
Status: DISCONTINUED | OUTPATIENT
Start: 2023-08-05 | End: 2023-08-10 | Stop reason: HOSPADM

## 2023-08-05 RX ORDER — POLYETHYLENE GLYCOL 3350 17 G/17G
17 POWDER, FOR SOLUTION ORAL DAILY
Status: DISCONTINUED | OUTPATIENT
Start: 2023-08-06 | End: 2023-08-10 | Stop reason: HOSPADM

## 2023-08-05 RX ORDER — SODIUM CHLORIDE 0.9 % (FLUSH) 0.9 %
10 SYRINGE (ML) INJECTION EVERY 12 HOURS SCHEDULED
Status: DISCONTINUED | OUTPATIENT
Start: 2023-08-05 | End: 2023-08-10 | Stop reason: HOSPADM

## 2023-08-05 RX ORDER — BISACODYL 5 MG/1
5 TABLET, DELAYED RELEASE ORAL DAILY
Status: DISCONTINUED | OUTPATIENT
Start: 2023-08-05 | End: 2023-08-10 | Stop reason: HOSPADM

## 2023-08-05 RX ORDER — ALBUTEROL SULFATE 2.5 MG/3ML
2.5 SOLUTION RESPIRATORY (INHALATION) EVERY 6 HOURS PRN
Status: DISCONTINUED | OUTPATIENT
Start: 2023-08-05 | End: 2023-08-10 | Stop reason: HOSPADM

## 2023-08-05 RX ORDER — ONDANSETRON 4 MG/1
4 TABLET, FILM COATED ORAL EVERY 6 HOURS PRN
Status: DISCONTINUED | OUTPATIENT
Start: 2023-08-05 | End: 2023-08-10 | Stop reason: HOSPADM

## 2023-08-05 RX ORDER — PANTOPRAZOLE SODIUM 40 MG/1
40 TABLET, DELAYED RELEASE ORAL
Status: DISCONTINUED | OUTPATIENT
Start: 2023-08-06 | End: 2023-08-10 | Stop reason: HOSPADM

## 2023-08-05 RX ADMIN — CEFDINIR 300 MG: 300 CAPSULE ORAL at 22:12

## 2023-08-05 RX ADMIN — SENNOSIDES AND DOCUSATE SODIUM 2 TABLET: 50; 8.6 TABLET ORAL at 20:29

## 2023-08-05 RX ADMIN — Medication 10 ML: at 20:29

## 2023-08-05 RX ADMIN — APIXABAN 5 MG: 5 TABLET, FILM COATED ORAL at 20:28

## 2023-08-05 RX ADMIN — METRONIDAZOLE 500 MG: 500 TABLET, FILM COATED ORAL at 22:11

## 2023-08-05 RX ADMIN — SODIUM CHLORIDE 500 ML: 9 INJECTION, SOLUTION INTRAVENOUS at 14:58

## 2023-08-05 RX ADMIN — ATORVASTATIN CALCIUM 40 MG: 20 TABLET, FILM COATED ORAL at 20:29

## 2023-08-05 RX ADMIN — METOPROLOL TARTRATE 50 MG: 50 TABLET, FILM COATED ORAL at 20:28

## 2023-08-05 RX ADMIN — PRAMIPEXOLE DIHYDROCHLORIDE 1.5 MG: 1.5 TABLET ORAL at 22:11

## 2023-08-05 RX ADMIN — TAMSULOSIN HYDROCHLORIDE 0.4 MG: 0.4 CAPSULE ORAL at 22:11

## 2023-08-05 RX ADMIN — FUROSEMIDE 40 MG: 40 TABLET ORAL at 20:29

## 2023-08-05 RX ADMIN — BISACODYL 5 MG: 5 TABLET ORAL at 20:28

## 2023-08-05 NOTE — Clinical Note
This patient was to be admitted to home health on 8/5, on my admission patient was unable to eat/ drink with contious nausea x24 hours . with low BP and unable to take any medications since hospital DC. Family and patient agreed, and patient transported by EMS to [facility]. Admitted to hospital.   Thank you   Ariane ALAN

## 2023-08-05 NOTE — ED NOTES
"Nursing report ED to floor  oJse Hurtado  73 y.o.  male    HPI :   Chief Complaint   Patient presents with    Nausea     Home health sent to [facility] for anorexia/nausea after being sent home yesterday. Hx of \"esophageal stretch\"       Admitting doctor:   Nitesh Oglesby DO    Admitting diagnosis:   The primary encounter diagnosis was Nausea. A diagnosis of Dehydration was also pertinent to this visit.    Code status:   Current Code Status       Date Active Code Status Order ID Comments User Context       Prior            Allergies:   Patient has no known allergies.    Isolation:   No active isolations    Intake and Output  No intake or output data in the 24 hours ending 08/05/23 1643    Weight:       08/05/23  1514   Weight: 77.1 kg (170 lb)       Most recent vitals:   Vitals:    08/05/23 1501 08/05/23 1509 08/05/23 1513 08/05/23 1514   BP: 119/52      Patient Position:       Pulse: 52 55 53    Resp:       Temp:       TempSrc:       SpO2:  97% 100%    Weight:    77.1 kg (170 lb)   Height:    195.6 cm (77\")       Active LDAs/IV Access:   Lines, Drains & Airways       Active LDAs       Name Placement date Placement time Site Days    Peripheral IV 08/02/23 1456 Posterior;Right Wrist 08/02/23  1456  Wrist  3    Urethral Catheter Coude 14 Fr. 08/04/23  0905  -- 1                    Labs (abnormal labs have a star):   Labs Reviewed   COMPREHENSIVE METABOLIC PANEL - Abnormal; Notable for the following components:       Result Value    Glucose 109 (*)     Sodium 135 (*)     Albumin 3.3 (*)     All other components within normal limits    Narrative:     GFR Normal >60  Chronic Kidney Disease <60  Kidney Failure <15    The GFR formula is only valid for adults with stable renal function between ages 18 and 70.   URINALYSIS W/ MICROSCOPIC IF INDICATED (NO CULTURE) - Abnormal; Notable for the following components:    Protein, UA 30 mg/dL (1+) (*)     Leuk Esterase, UA Small (1+) (*)     All other components within normal limits "   PHOSPHORUS - Abnormal; Notable for the following components:    Phosphorus 2.3 (*)     All other components within normal limits   CBC WITH AUTO DIFFERENTIAL - Abnormal; Notable for the following components:    RBC 3.77 (*)     Hemoglobin 11.1 (*)     Hematocrit 33.2 (*)     Lymphocyte % 16.5 (*)     Lymphocytes, Absolute 0.61 (*)     All other components within normal limits   URINALYSIS, MICROSCOPIC ONLY - Abnormal; Notable for the following components:    WBC, UA 6-12 (*)     All other components within normal limits   MAGNESIUM - Normal   CBC AND DIFFERENTIAL    Narrative:     The following orders were created for panel order CBC & Differential.  Procedure                               Abnormality         Status                     ---------                               -----------         ------                     CBC Auto Differential[[id number]]        Abnormal            Final result                 Please view results for these tests on the individual orders.       EKG:   No orders to display       Meds given in ED:   Medications   sodium chloride 0.9 % flush 10 mL (has no administration in time range)   sodium chloride 0.9 % bolus 500 mL (500 mL Intravenous New Bag 23 1458)       Imaging results:  XR Chest 1 View    Result Date: 2023  Stable exam  This report was finalized on 2023 2:40 PM by Dr. Miguel Hogan M.D.       Ambulatory status:   Partial assist    Social issues:   Social History     Socioeconomic History    Marital status:      Spouse name: Lena    Number of children: 0    Years of education: College   Tobacco Use    Smoking status: Former     Packs/day: 2.00     Years: 3.00     Pack years: 6.00     Types: Cigarettes     Quit date:      Years since quittin.6    Smokeless tobacco: Former     Types: Chew    Tobacco comments:     Caffeine - coffee and soda    Vaping Use    Vaping Use: Never used   Substance and Sexual Activity    Alcohol use: Yes      "Alcohol/week: 3.0 standard drinks     Types: 1 Glasses of wine, 1 Cans of beer, 1 Shots of liquor per week     Comment: 2    Drug use: Never     Comment: hx marijuana use \"a long time ago\"    Sexual activity: Defer     Partners: Male       NIH Stroke Scale:       Anca Serra RN  08/05/23 16:43 EDT          "

## 2023-08-05 NOTE — H&P
"    Patient Name:  Jose Hurtado  YOB: 1950  MRN:  [id number]  Admit Date:  8/5/2023  Patient Care Team:  Brandin Bolton MD as PCP - General (Internal Medicine)  Code, George THORPE II, MD as Consulting Physician (Hematology and Oncology)  Jose Inman MD as Consulting Physician (Urology)  Mulugeta Millan MD as Consulting Physician (Gastroenterology)  Jose Christine III, MD as Referring Physician (Thoracic Surgery)  Seth Randhawa MD as Consulting Physician (Ophthalmology)  James Cyr MD as Consulting Physician (Cardiology)  Stephon Sommers MD as Consulting Physician (Sleep Medicine)  Rolanda Solomon MD as Consulting Physician (Nephrology)      Subjective   History Present Illness     Chief Complaint   Patient presents with    Nausea     Home health sent to [facility] for anorexia/nausea after being sent home yesterday. Hx of \"esophageal stretch\"     Mr. Hurtado is a 73 y.o. male with a history of esophageal cancer s/p esophagectomy, antiphospholipid syndrome on chronic anticoagulation, paroxysmal atrial fibrillation, hypertension, CKD 2, hearing loss, lymphedema, prior DVT and recent intra-abdominal infection, acute urinary retention who presented to [facility] with complaints of decreased appetite, decreased oral intake and retching after attempts to eat at home prior to arrival. Of note, patient was discharged from here yesterday after hospital stay for intra-abdominal infection and acute urinary retention. Once he arrived home, symptoms as above began and have been near constant since then. Wife at bedside who helps to provide further insight. Given patient's history of esophageal cancer with prior esophagectomy, patient and wife concerned that he may need repeat EGD to evaluate for any underlying cause to explain symptoms. He is being admitted for further evaluation and management.    Personal History     Past Medical History:   Diagnosis Date    Acute deep vein " thrombosis (DVT) of popliteal vein of left lower extremity 03/24/2020    Anemia     Anti-phospholipid syndrome     On lifelong anticoagulation therapy    Bladder disorder     LEAKAGE   ON MED  wers pads    Bleeding hemorrhoids     Chronic kidney disease     Community acquired pneumonia     HISTORY OF IN 2014    Congestive heart failure     COPD (chronic obstructive pulmonary disease)     Deep venous thrombosis 2006, 2008    Left lower extremity multiple    Depression     Esophageal carcinoma 12/31/2014    had chemo and radiation prior surgery    Hemorrhoids     HH (hiatus hernia)     History of atrial fibrillation 2015    ONE EPISODE WHILE HOSPITALIZED    History of kidney stones     History of nephrolithiasis     History of pancreatitis     PT STATES MANY YEARS AGO    History of radiation therapy     History of transfusion     Hypertension     Long-term (current) use of anticoagulants, INR goal 2.0-3.0     Lymphedema     Malignant neoplasm of prostate     Other hyperlipidemia 01/30/2018 January 30, 2018 lipid panel risk 12.8%    Restless legs syndrome     Sleep apnea     OCCASIONALLY WEARS CPAP    Squamous carcinoma     on the head     Past Surgical History:   Procedure Laterality Date    APPENDECTOMY  1950    BRONCHOSCOPY      (Diagnostic)    CARDIAC CATHETERIZATION N/A 5/14/2022    Procedure: Left Heart Cath;  Surgeon: Eric So MD;  Location:  [facility];  Service: Cardiology;  Laterality: N/A;    CARDIAC CATHETERIZATION N/A 5/14/2022    Procedure: Coronary angiography;  Surgeon: Eric So MD;  Location:  [facility];  Service: Cardiology;  Laterality: N/A;    CARDIAC CATHETERIZATION N/A 5/14/2022    Procedure: Left ventriculography;  Surgeon: Eric So MD;  Location:  [facility];  Service: Cardiology;  Laterality: N/A;    CATARACT EXTRACTION Bilateral 2014    COLONOSCOPY  12/15/2014    Complete / Description: EH, IH,  torts, stool, follow-up colonoscopy due in 5 years.    COLONOSCOPY N/A 6/13/2017    non-thrombosed external hemorrhoids, normal examined ileum, IH    COLONOSCOPY N/A 2/26/2019    Procedure: COLONOSCOPY with Cold Polypectomy;  Surgeon: Mulugeta Millan MD;  Location: [facility] ENDOSCOPY;  Service: Gastroenterology    COLONOSCOPY N/A 12/16/2020    Procedure: COLONOSCOPY to cecum into TI;  Surgeon: Mesha Sanchez MD;  Location: [facility] ENDOSCOPY;  Service: Gastroenterology;  Laterality: N/A;  pre: lower GI bleed  post: hemorrhoids     CYSTOSCOPY W/ LASER LITHOTRIPSY      ENDOSCOPY N/A 6/13/2017    Procedure: ESOPHAGOGASTRODUODENOSCOPY WITH COLD BIOPSY;  Surgeon: Lake Gonzalez MD;  Location: [facility] ENDOSCOPY;  Service:     ENDOSCOPY N/A 11/18/2021    Procedure: ESOPHAGOGASTRODUODENOSCOPY WITH  DILATATION WITH FLUOROSCOPY;  Surgeon: Jose Christine III, MD;  Location: [facility] ENDOSCOPY;  Service: Gastroenterology;  Laterality: N/A;  Pre: dysphagia, h/x of adinocarcinoma of esophagus  Post: same    ESOPHAGECTOMY      April 2015, stage IIB esophageal carcinoma, sub-total resection.    ESOPHAGECTOMY      Esophagectomy Subtotal [place] Joe Procedure    EXCISION LESION  08/2012    Removal of Squamous Cell CA on Head    HAMMER TOE REPAIR  09/2014    Hammertoe Operation (Each Toe), 10/2014    HAMMER TOE REPAIR Left 10/3/2017    Procedure: Left second third and fourth distal interphalangeal joint resection with flexor tenotomy;  Surgeon: Mulugeta Lira MD;  Location: [facility] OR OSC;  Service:     HEMORRHOIDECTOMY N/A 12/23/2020    Procedure: HEMORRHOIDECTOMY;  Surgeon: Billy Julio Jr., MD;  Location: [facility] MAIN OR;  Service: General;  Laterality: N/A;    HERNIA REPAIR      incisional    JEJUNOSTOMY      Laparoscopic    JEJUNOSTOMY      tube removal     KNEE SURGERY Bilateral 1967, 1973, 1981    PATELLA SURGERY Left     removed    PILONIDAL CYST / SINUS EXCISION      KY ARTHRP KNE CONDYLE&PLATU MEDIAL&LAT  "COMPARTMENTS Right 3/26/2018    Procedure: TOTAL KNEE ARTHROPLASTY;  Surgeon: Renny Solis MD;  Location:  [facility];  Service: Orthopedics    PROSTATECTOMY      PYLOROPLASTY      SIGMOIDOSCOPY N/A 2023    Procedure: SIGMOIDOSCOPY FLEXIBLE;  Surgeon: Mesha Sanchez MD;  Location: [facility] ENDOSCOPY;  Service: Gastroenterology;  Laterality: N/A;  PRE- ABNORMAL CT  POST- HEMORRHOIDS, ULCERATION    SPINAL FUSION  1998    C 5,6    TONSILLECTOMY      UPPER GASTROINTESTINAL ENDOSCOPY  12/15/2014    LA Grade D esophagitis, Pardo's, HH, multiple duodenal ulcers    VENTRAL/INCISIONAL HERNIA REPAIR N/A 2016    Procedure: VENTRAL/INCISIONAL HERNIA REPAIR, open, with mesh, and component separation;  Surgeon: Darren Rivas MD;  Location:  [facility];  Service:      Family History   Problem Relation Age of Onset    Cerebral aneurysm Mother         cerebral artery aneurysm ( age 56)    Anxiety disorder Father     Suicide Attempts Father          of suicide    Cancer Father         bladder    Prostate cancer Brother 68    No Known Problems Brother     Pancreatic cancer Nephew     Colon cancer Neg Hx     Esophageal cancer Neg Hx     Dementia Neg Hx     Malig Hyperthermia Neg Hx      Social History     Tobacco Use    Smoking status: Former     Packs/day: 2.00     Years: 3.00     Pack years: 6.00     Types: Cigarettes     Quit date:      Years since quittin.6    Smokeless tobacco: Former     Types: Chew    Tobacco comments:     Caffeine - coffee and soda    Vaping Use    Vaping Use: Never used   Substance Use Topics    Alcohol use: Yes     Alcohol/week: 3.0 standard drinks     Types: 1 Glasses of wine, 1 Cans of beer, 1 Shots of liquor per week     Comment: 2    Drug use: Never     Comment: hx marijuana use \"a long time ago\"     No current facility-administered medications on file prior to encounter.     Current Outpatient Medications on File Prior to Encounter   Medication Sig " Dispense Refill    albuterol sulfate  (90 Base) MCG/ACT inhaler Inhale 1 puff Every 4 (Four) Hours As Needed for Wheezing.      atorvastatin (LIPITOR) 40 MG tablet Take 1 tablet by mouth Every Night. 90 tablet 3    bisacodyl (DULCOLAX) 5 MG EC tablet Take 1 tablet by mouth Daily. 30 tablet 0    cefdinir (OMNICEF) 300 MG capsule Take 1 capsule by mouth 2 (Two) Times a Day for 1 day. 2 capsule 0    cyanocobalamin 1000 MCG/ML injection INJECT 1 ML INTRAMUSCULARLY ONCE EVERY 30 DAYS AS DIRECTED 1 mL 11    Eliquis 5 MG tablet tablet TAKE ONE TABLET BY MOUTH EVERY 12 HOURS 60 tablet 1    furosemide (LASIX) 40 MG tablet Take 1 tablet by mouth 2 (Two) Times a Day.      Gemtesa 75 MG tablet Daily.      metoprolol tartrate (LOPRESSOR) 50 MG tablet Take 1 tablet by mouth 2 (Two) Times a Day for 60 days. 60 tablet 1    metroNIDAZOLE (Flagyl) 500 MG tablet Take 1 tablet by mouth 3 (Three) Times a Day for 2 days. 6 tablet 0    pantoprazole (PROTONIX) 40 MG EC tablet TAKE ONE TABLET BY MOUTH TWICE A DAY BEFORE A MEAL 180 tablet 3    PARoxetine (PAXIL) 40 MG tablet TAKE ONE TABLET BY MOUTH EVERY MORNING 30 tablet 5    polyethylene glycol (MIRALAX) 17 g packet Take 17 g by mouth Daily. 30 each 0    potassium chloride (MICRO-K) 10 MEQ CR capsule Take 1 capsule by mouth Daily. 90 capsule 5    pramipexole (MIRAPEX) 1.5 MG tablet TAKE ONE TABLET BY MOUTH TWICE A  tablet 1    sennosides-docusate (PERICOLACE) 8.6-50 MG per tablet Take 2 tablets by mouth 2 (Two) Times a Day. 120 tablet 0    tamsulosin (FLOMAX) 0.4 MG capsule 24 hr capsule Take 1 capsule by mouth Daily. 30 capsule 0    tiotropium bromide monohydrate (Spiriva Respimat) 2.5 MCG/ACT aerosol solution inhaler Inhale 2 puffs Daily. 4 g 2    tiotropium bromide-olodaterol (Stiolto Respimat) 2.5-2.5 MCG/ACT aerosol solution inhaler Inhale Daily.       No Known Allergies    Objective    Objective     Vital Signs  Temp:  [96.1 øF (35.6 øC)-97.5 øF (36.4 øC)] 97.5 øF  (36.4 øC)  Heart Rate:  [50-60] 58  Resp:  [16] 16  BP: (107-134)/(49-71) 134/71  SpO2:  [97 %-100 %] 100 %  on   ;      Body mass index is 20.16 kg/mý.    Physical Exam  Vitals and nursing note reviewed.   Constitutional:       General: He is awake.      Appearance: He is ill-appearing.   Cardiovascular:      Rate and Rhythm: Normal rate and regular rhythm.      Pulses: Normal pulses.      Heart sounds: Normal heart sounds.   Pulmonary:      Effort: Pulmonary effort is normal. No respiratory distress.      Breath sounds: Normal breath sounds.   Abdominal:      General: Bowel sounds are normal. There is no distension.      Palpations: Abdomen is soft.      Tenderness: There is no abdominal tenderness.   Skin:     General: Skin is warm and dry.      Coloration: Skin is pale.      Findings: Bruising (scattered) present.   Neurological:      Mental Status: He is alert.   Psychiatric:         Behavior: Behavior is cooperative.       Results Review:  I reviewed the patient's new clinical results.  I reviewed the patient's new imaging results and agree with the interpretation.  I reviewed the patient's other test results and agree with the interpretation  I personally viewed and interpreted the patient's EKG/Telemetry data  Discussed with ED provider.    Lab Results (last 24 hours)       Procedure Component Value Units Date/Time    CBC & Differential [[id number]]  (Abnormal) Collected: 08/05/23 1427    Specimen: Blood Updated: 08/05/23 1435    Narrative:      The following orders were created for panel order CBC & Differential.  Procedure                               Abnormality         Status                     ---------                               -----------         ------                     CBC Auto Differential[[id number]]        Abnormal            Final result                 Please view results for these tests on the individual orders.    Comprehensive Metabolic Panel [[id number]]  (Abnormal) Collected: 08/05/23  1427    Specimen: Blood Updated: 08/05/23 1504     Glucose 109 mg/dL      BUN 20 mg/dL      Creatinine 1.11 mg/dL      Sodium 135 mmol/L      Potassium 4.7 mmol/L      Chloride 105 mmol/L      CO2 24.0 mmol/L      Calcium 8.7 mg/dL      Total Protein 6.0 g/dL      Albumin 3.3 g/dL      ALT (SGPT) 10 U/L      AST (SGOT) 14 U/L      Alkaline Phosphatase 80 U/L      Total Bilirubin 0.2 mg/dL      Globulin 2.7 gm/dL      A/G Ratio 1.2 g/dL      BUN/Creatinine Ratio 18.0     Anion Gap 6.0 mmol/L      eGFR 69.2 mL/min/1.73     Narrative:      GFR Normal >60  Chronic Kidney Disease <60  Kidney Failure <15    The GFR formula is only valid for adults with stable renal function between ages 18 and 70.    Magnesium [[id number]]  (Normal) Collected: 08/05/23 1427    Specimen: Blood Updated: 08/05/23 1504     Magnesium 2.1 mg/dL     Phosphorus [[id number]]  (Abnormal) Collected: 08/05/23 1427    Specimen: Blood Updated: 08/05/23 1504     Phosphorus 2.3 mg/dL     CBC Auto Differential [[id number]]  (Abnormal) Collected: 08/05/23 1427    Specimen: Blood Updated: 08/05/23 1435     WBC 3.69 10*3/mm3      RBC 3.77 10*6/mm3      Hemoglobin 11.1 g/dL      Hematocrit 33.2 %      MCV 88.1 fL      MCH 29.4 pg      MCHC 33.4 g/dL      RDW 13.7 %      RDW-SD 43.4 fl      MPV 9.3 fL      Platelets 146 10*3/mm3      Neutrophil % 70.4 %      Lymphocyte % 16.5 %      Monocyte % 9.8 %      Eosinophil % 2.7 %      Basophil % 0.3 %      Immature Grans % 0.3 %      Neutrophils, Absolute 2.60 10*3/mm3      Lymphocytes, Absolute 0.61 10*3/mm3      Monocytes, Absolute 0.36 10*3/mm3      Eosinophils, Absolute 0.10 10*3/mm3      Basophils, Absolute 0.01 10*3/mm3      Immature Grans, Absolute 0.01 10*3/mm3      nRBC 0.0 /100 WBC     Urinalysis With Microscopic If Indicated (No Culture) - Urine, Catheter [[id number]]  (Abnormal) Collected: 08/05/23 1452    Specimen: Urine, Catheter Updated: 08/05/23 150     Color, UA Yellow     Appearance, UA Clear     pH,  UA 5.5     Specific Gravity, UA 1.021     Glucose, UA Negative     Ketones, UA Negative     Bilirubin, UA Negative     Blood, UA Negative     Protein, UA 30 mg/dL (1+)     Leuk Esterase, UA Small (1+)     Nitrite, UA Negative     Urobilinogen, UA 0.2 E.U./dL    Urinalysis, Microscopic Only - Urine, Catheter [[id number]]  (Abnormal) Collected: 08/05/23 1452    Specimen: Urine, Catheter Updated: 08/05/23 1523     RBC, UA 0-2 /HPF      WBC, UA 6-12 /HPF      Bacteria, UA None Seen /HPF      Squamous Epithelial Cells, UA 0-2 /HPF      Hyaline Casts, UA None Seen /LPF      WBC Clumps, UA Small/1+ /HPF      Methodology Manual Light Microscopy            Imaging Results (Last 24 Hours)       Procedure Component Value Units Date/Time    XR Chest 1 View [[id number]] Collected: 08/05/23 1437     Updated: 08/05/23 1443    Narrative:      AP CHEST     HISTORY: Nausea and fatigue     COMPARISON: 07/29/2023     FINDINGS: Stable changes of esophagectomy and gastric pull-through.  Stable minimal right pleural effusion. No new infiltrates are seen in  the left lung. Heart size stable. Postoperative changes of the cervical  spine       Impression:      Stable exam     This report was finalized on 8/5/2023 2:40 PM by Dr. Miguel Hogan M.D.               Results for orders placed during the hospital encounter of 05/12/22    Adult Transthoracic Echo Complete w/ Color, Spectral and Contrast if necessary per protocol    Interpretation Summary  ú Estimated right ventricular systolic pressure from tricuspid regurgitation is mildly elevated (35-45 mmHg). Calculated right ventricular systolic pressure from tricuspid regurgitation is 43 mmHg.  ú Left ventricular wall thickness is consistent with mild concentric hypertrophy.  ú Estimated left ventricular EF = 56% Left ventricular systolic function is normal.  ú Left ventricular diastolic function was normal.      No orders to display        Assessment/Plan     Active Hospital Problems     Diagnosis  POA    **Nausea [R11.0]  Yes    Other symptoms and signs concerning food and fluid intake [R63.8]  Yes    Dysphagia [R13.10]  Yes    Severe malnutrition [E43]  Yes    Acute urinary retention [R33.8]  Yes    Chronic obstructive pulmonary disease [J44.9]  Yes    CKD (chronic kidney disease) stage 2, GFR 60-89 ml/min [N18.2]  Yes    Chronic anticoagulation [Z79.01]  Not Applicable    History of recurrent deep vein thrombosis (DVT) [Z86.718]  Not Applicable    Hypertension [I10]  Yes    History of esophageal cancer [Z85.01]  Yes      Resolved Hospital Problems   No resolved problems to display.     Mr. Hurtado is a 73 y.o. male with a history of esophageal cancer s/p esophagectomy, antiphospholipid syndrome on chronic anticoagulation, paroxysmal atrial fibrillation, hypertension, hearing loss, lymphedema, prior DVT and recent intra-abdominal infection, acute urinary retention who presented to [facility] with complaints of decreased appetite, decreased oral intake and retching after attempts to eat at home prior to arrival.     Nausea  Severe malnutrition  Dysphagia  Other symptoms and signs concerning food and fluid intake  History of esophageal cancer s/p esophagectomy  GERD  - Consult GI, SLP, nutrition.  - Continue PPI.  - Aspiration precautions.    Hypophosphatemia  - Phos low on most recent labs; Will replete via electrolyte protocol. Follow up repeat labs to guide ongoing management decisions. Replete PRN per protocol. Continue to monitor    Intra-abdominal infection  - Per discharge summary from 08/04:  Gastroenterology was consulted. Initial imaging with CT abdomen pelvis showed rectal wall thickening with surrounding stranding/inflammation consistent with focal colitis/proctitis. He was treated with antibiotics for intra-abdominal infection, GI performed flexible sigmoidoscopy on 08/02, which revealed 2 small ulcers in the rectum and in the rectosigmoid colon, biopsy contraindicated,  also showed nonbleeding internal hemorrhoids.   - Continue Cefdinir and Flagyl as previously prescribed to complete 7 day antibiotic course.    Acute urinary retention  - Noted during recent stay, imaging at that time showed moderate distention of the urinary bladder with hydroureter and mild hydronephrosis.  - Urology consulted, lama catheter placed, attempted voiding trial prior to discharge but PVR >500, so lama had to be placed again.  - Continue lama catheter, needs outpatient follow up with Urology.    Anemia  - Hemoglobin low on most recent labs, however, stable from prior. No evidence of overt blood loss. No indications for acute intervention at this time.    - Order repeat CBC in AM for reassessment. Continue to monitor, transfuse for hemoglobin <7.    Hypertension  - BP acceptable acutely. Appears stable. No indications to warrant acute changes/intervention at this time.  - Continue current medications as prescribed. Trend BP to guide ongoing management decisions.    Chronic kidney disease stage 2  - On most recent labs, patient's creatinine appears stable and near apparent baseline, per review of previous lab values and outside records.  - No indications to warrant acute changes and/or intervention at this time.  - Order repeat BMP in AM for reassessment of renal function.   - Will continue to monitor and trend creatinine to guide ongoing management decisions. Avoid nephrotoxic medications, IVF, strict I/O, daily weights.    COPD  - Appears stable from this perspective at present.  No evidence to suggest acute exacerbation.  Continue current medications as prescribed and closely monitor.  - Supplemental oxygen as needed to maintain O2 sats 88 to 92%, pulse oximetry.    Antiphospholipid syndrome  History of DVT  - Continue Eliquis as prescribed.    I discussed the patient's findings and my recommendations with patient, nursing staff, and ED provider.    VTE Prophylaxis - Eliquis (home med).  Code Status  - Full code.       Nitesh Oglesby DO  [facility]  08/05/23  19:15 EDT

## 2023-08-05 NOTE — ED PROVIDER NOTES
MD ATTESTATION NOTE    The ALCIDES and I have discussed this patient's history, physical exam, and treatment plan.  I have reviewed the documentation and personally had a face to face interaction with the patient. I affirm the documentation and agree with the treatment and plan.  The attached note describes my personal findings.    I provided a substantive portion of the care of this patient. I personally performed the physical exam, in its entirety.    Independent Historians: Patient, EMS    A complete HPI/ROS/PMH/PSH/SH/FH are unobtainable due to: Nothing    Chronic or social conditions impacting patient care (social determinants of health): None    Jose Hurtado is a 73 y.o. male who presents to the ED c/o acute not eating and nausea.  The patient was discharged from this hospital yesterday after having colitis versus proctitis by CT scan.  He had a flexible sigmoidoscopy with 2 small ulcers in the rectum .  The patient reports that he has had decreased appetite and has had generalized weakness since arriving home.  He denies any fall or injury.  He denies any pain.  Home health came to visit him today and he was directed to the emergency room because of his nausea and not eating.      Review of prior external notes (non-ED) -and- Review of prior external test results outside of this encounter: Laboratory evaluation dated 7/30/2023 shows a CMP which is normal.    Prescription drug monitoring program review:        On exam:  GENERAL: Awake, alert, no acute distress, chronically ill-appearing  SKIN: Warm, dry  HENT: Normocephalic, atraumatic  EYES: no scleral icterus  CV: regular rhythm, regular rate  RESPIRATORY: normal effort, lungs clear  ABDOMEN: soft, nontender, nondistended  MUSCULOSKELETAL: no deformity  NEURO: alert, moves all extremities, follows commands                                                             Labs  Recent Results (from the past 24 hour(s))   Comprehensive Metabolic Panel    Collection  Time: 08/05/23  2:27 PM    Specimen: Blood   Result Value Ref Range    Glucose 109 (H) 65 - 99 mg/dL    BUN 20 8 - 23 mg/dL    Creatinine 1.11 0.76 - 1.27 mg/dL    Sodium 135 (L) 136 - 145 mmol/L    Potassium 4.7 3.5 - 5.2 mmol/L    Chloride 105 98 - 107 mmol/L    CO2 24.0 22.0 - 29.0 mmol/L    Calcium 8.7 8.6 - 10.5 mg/dL    Total Protein 6.0 6.0 - 8.5 g/dL    Albumin 3.3 (L) 3.5 - 5.2 g/dL    ALT (SGPT) 10 1 - 41 U/L    AST (SGOT) 14 1 - 40 U/L    Alkaline Phosphatase 80 39 - 117 U/L    Total Bilirubin 0.2 0.0 - 1.2 mg/dL    Globulin 2.7 gm/dL    A/G Ratio 1.2 g/dL    BUN/Creatinine Ratio 18.0 7.0 - 25.0    Anion Gap 6.0 5.0 - 15.0 mmol/L    eGFR 69.2 >60.0 mL/min/1.73   Magnesium    Collection Time: 08/05/23  2:27 PM    Specimen: Blood   Result Value Ref Range    Magnesium 2.1 1.6 - 2.4 mg/dL   Phosphorus    Collection Time: 08/05/23  2:27 PM    Specimen: Blood   Result Value Ref Range    Phosphorus 2.3 (L) 2.5 - 4.5 mg/dL   CBC Auto Differential    Collection Time: 08/05/23  2:27 PM    Specimen: Blood   Result Value Ref Range    WBC 3.69 3.40 - 10.80 10*3/mm3    RBC 3.77 (L) 4.14 - 5.80 10*6/mm3    Hemoglobin 11.1 (L) 13.0 - 17.7 g/dL    Hematocrit 33.2 (L) 37.5 - 51.0 %    MCV 88.1 79.0 - 97.0 fL    MCH 29.4 26.6 - 33.0 pg    MCHC 33.4 31.5 - 35.7 g/dL    RDW 13.7 12.3 - 15.4 %    RDW-SD 43.4 37.0 - 54.0 fl    MPV 9.3 6.0 - 12.0 fL    Platelets 146 140 - 450 10*3/mm3    Neutrophil % 70.4 42.7 - 76.0 %    Lymphocyte % 16.5 (L) 19.6 - 45.3 %    Monocyte % 9.8 5.0 - 12.0 %    Eosinophil % 2.7 0.3 - 6.2 %    Basophil % 0.3 0.0 - 1.5 %    Immature Grans % 0.3 0.0 - 0.5 %    Neutrophils, Absolute 2.60 1.70 - 7.00 10*3/mm3    Lymphocytes, Absolute 0.61 (L) 0.70 - 3.10 10*3/mm3    Monocytes, Absolute 0.36 0.10 - 0.90 10*3/mm3    Eosinophils, Absolute 0.10 0.00 - 0.40 10*3/mm3    Basophils, Absolute 0.01 0.00 - 0.20 10*3/mm3    Immature Grans, Absolute 0.01 0.00 - 0.05 10*3/mm3    nRBC 0.0 0.0 - 0.2 /100 WBC  "  Urinalysis With Microscopic If Indicated (No Culture) - Urine, Catheter    Collection Time: 08/05/23  2:52 PM    Specimen: Urine, Catheter   Result Value Ref Range    Color, UA Yellow Yellow, Straw    Appearance, UA Clear Clear    pH, UA 5.5 5.0 - 8.0    Specific Gravity, UA 1.021 1.005 - 1.030    Glucose, UA Negative Negative    Ketones, UA Negative Negative    Bilirubin, UA Negative Negative    Blood, UA Negative Negative    Protein, UA 30 mg/dL (1+) (A) Negative    Leuk Esterase, UA Small (1+) (A) Negative    Nitrite, UA Negative Negative    Urobilinogen, UA 0.2 E.U./dL 0.2 - 1.0 E.U./dL   Urinalysis, Microscopic Only - Urine, Catheter    Collection Time: 08/05/23  2:52 PM    Specimen: Urine, Catheter   Result Value Ref Range    RBC, UA 0-2 None Seen, 0-2 /HPF    WBC, UA 6-12 (A) None Seen, 0-2 /HPF    Bacteria, UA None Seen None Seen /HPF    Squamous Epithelial Cells, UA 0-2 None Seen, 0-2 /HPF    Hyaline Casts, UA None Seen None Seen /LPF    WBC Clumps, UA Small/1+ None Seen /HPF    Methodology Manual Light Microscopy        Radiology  XR Chest 1 View    Result Date: 8/5/2023  AP CHEST  HISTORY: Nausea and fatigue  COMPARISON: 07/29/2023  FINDINGS: Stable changes of esophagectomy and gastric pull-through. Stable minimal right pleural effusion. No new infiltrates are seen in the left lung. Heart size stable. Postoperative changes of the cervical spine      Stable exam  This report was finalized on 8/5/2023 2:40 PM by Dr. Miguel Hogan M.D.       Medical Decision Making:  ED Course as of 08/05/23 1639   Sat Aug 05, 2023   1436 XR Chest 1 View  My independent interpretation of the chest x-ray is no pneumothorax [TR]   1450 WBC: 3.69 [DC]   1450 Hemoglobin(!): 11.1  Chronic, no change from 2 days ago [DC]   1540 Messages from the home health nurse from today:    \"Unable to eat or take medication in 24 hours, dark urine, during my admission I had him eat some food after 15 mins he started to become nauseated and " "dry heaving. Pressure was 90's systolic, pale. High risk for dehydration over the weekend.      The dry heaving continuous is what made him go to the ER last weekend.\" [TR]   1618 After discussing with patient and spouse at bedside, spouse does not feel comfortable taking patient home.  They have been home for less than 24 hours and the patient has not been eating, drinking, or taking his medications.  When he has tried to eat he is began having retching episodes.  They state when he was in the hospital he was tolerating p.o. without difficulty and without having these episodes.  There is some concern from his spouse due to to his to his history of esophageal cancer and esophagectomy for upper GI problem.  We will plan to admit to the hospital for IV fluids and further evaluation. [DC]   1636 Discussed case with Dr. Oglesby ZEINAB, who will admit the patient. [DC]      ED Course User Index  [DC] Alee Davidson PA  [TR] Escobar Duvall MD       The patient was just discharged from this hospital yesterday.  He reports nausea and not eating.  He looks chronically ill.  We will obtain chemistries and blood counts as well as urinalysis.  My differential diagnosis includes renal failure, malnutrition, hypokalemia, and others.    Procedures:  Procedures            Diagnosis  Final diagnoses:   Nausea   Dehydration       Note Disclaimer: At [facility], we believe that sharing information builds trust and better relationships. You are receiving this note because you recently visited [facility]. It is possible you will see health information before a provider has talked with you about it. This kind of information can be easy to misunderstand. To help you fully understand what it means for your health, we urge you to discuss this note with your provider.       Escobar Duvall MD  08/05/23 1639    "

## 2023-08-05 NOTE — PLAN OF CARE
Goal Outcome Evaluation:  Plan of Care Reviewed With: patient           Outcome Evaluation: pt arrived to unit around 1725, no c/o pain or nausea at this time. assist X1, alert and oriented X4, RA, spouse at bedside, will cont to monitor         Problem: Fall Injury Risk  Goal: Absence of Fall and Fall-Related Injury  Outcome: Ongoing, Progressing

## 2023-08-05 NOTE — ED PROVIDER NOTES
EMERGENCY DEPARTMENT ENCOUNTER    Room Number:  S609/1  Date of encounter:  8/5/2023  PCP: Brandin Bolton MD  Patient Care Team:  Brandin Bolton MD as PCP - General (Internal Medicine)  George Phelan II, MD as Consulting Physician (Hematology and Oncology)  Jose Inman MD as Consulting Physician (Urology)  Mulugeta Millan MD as Consulting Physician (Gastroenterology)  Jose Christine III, MD as Referring Physician (Thoracic Surgery)  Seth Randhawa MD as Consulting Physician (Ophthalmology)  James Cyr MD as Consulting Physician (Cardiology)  Stephon Sommers MD as Consulting Physician (Sleep Medicine)  Rolanda Solomon MD as Consulting Physician (Nephrology)   Independent Historians: Patient, EMS    HPI:  Chief Complaint: Nausea  A complete HPI/ROS/PMH/PSH/SH/FH are unobtainable due to: Patient is a poor historian    Chronic or social conditions impacting patient care (social determinants of health): None    Context: Jose Hurtado is a 73 y.o. male who presents to the ED c/o nausea and decreased appetite with retching at home.  Patient was discharged yesterday from the hospital after being admitted for similar and found to have colitis/proctitis and urinary retention.  He currently has an indwelling King catheter in place and to be followed by urology.  He has not had any fevers.  He denies any significant abdominal pain.  Today home health nurse came and patient was noted did not have had any significant oral intake or taking any of his medications since discharge from the hospital yesterday.  Patient attempted to eat on nurse was there but then began having nausea and retching with low systolic blood pressures, diaphoresis and pallor.  He denies any chest pain or shortness of breath.  Spouse at bedside denies any altered mental status or confusion.  She states the patient has not been able to eat or drink since home.  Of note patient does have history of esophageal cancer with  esophagectomy and repair about 8 years ago.      Review of prior external notes (non-ED): Reviewed discharge summary from 8/4/2023.  Patient was admitted on 7/29.  Found to have colitis/proctitis, urinary retention.  Treated with antibiotics and had GI evaluation with sigmoidoscopy which showed 2 small ulcers in the rectum and nonbleeding internal hemorrhoids.  Patient failed a voiding trial and went home with King catheter in place.  Was instructed to take 1 additional day of cefdinir and 2 additional days of Flagyl to complete 7-day course.  Patient to continue PPI.    Review of prior external test results outside of this encounter: CT abdomen pelvis on 7/29/2023 showed rectal wall thickening with surrounding stranding/inflammation consistent with focal colitis/proctitis.  Moderate distention of the urinary bladder with hydroureter and mild hydronephrosis with distention of the renal pelvises without evidence of obstructing stone.  Chronic loculated right basal pleural effusion similar to exam from March 2023.    PAST MEDICAL HISTORY  Active Ambulatory Problems     Diagnosis Date Noted    History of esophageal cancer 02/11/2016    Adenomatous polyp of colon 02/11/2016    Malignant neoplasm of prostate 02/11/2016    RLS (restless legs syndrome) 02/11/2016    History of DVT (deep vein thrombosis) 03/22/2016    Recurrent major depressive disorder, in partial remission 01/26/2017    Hypertension 04/12/2018    Anemia 04/18/2019    Insomnia due to other mental disorder 06/06/2019    Peripheral polyneuropathy 06/06/2019    B12 deficiency 06/07/2019    Lymphedema 01/14/2020    Iron deficiency 02/10/2020    Gastroparesis 02/15/2020    History of recurrent deep vein thrombosis (DVT) 03/24/2020    Tremor of both hands 07/27/2020    Gastrointestinal hemorrhage 12/15/2020    Acute blood loss anemia 12/16/2020    Pancytopenia 12/16/2020    Chronic venous hypertension due to DVT 12/16/2020    Bleeding hemorrhoid 12/17/2020     Malabsorption of iron 01/05/2021    Iron deficiency anemia 01/05/2021    History of esophageal cancer 05/11/2021    Abnormal CT scan 05/11/2021    Generalized weakness 05/11/2021    Chronic anticoagulation 05/11/2021    CKD (chronic kidney disease) stage 2, GFR 60-89 ml/min 05/12/2021    Dysphagia 11/10/2021    NSTEMI (non-ST elevated myocardial infarction) 05/12/2022    Bronchitis 05/13/2022    Dyspnea 12/16/2022    Elevated troponin 01/03/2023    Atrial fibrillation 01/03/2023    Chronic obstructive pulmonary disease 01/04/2023    Congestive heart failure 02/10/2023    Gait instability 05/12/2023    Dark stools 06/24/2023    Proctitis 07/29/2023    Nausea and vomiting 07/29/2023    Acute urinary retention 07/29/2023    Severe malnutrition 08/04/2023     Resolved Ambulatory Problems     Diagnosis Date Noted    Dysphagia 02/11/2016    Duodenal ulcer 02/11/2016    Decreased body weight 02/11/2016    Cough     Hyperkalemia 02/15/2016    DVT (deep venous thrombosis) 08/24/2017    Other hyperlipidemia 01/30/2018    Anti-phospholipid syndrome 05/10/2018    Postsurgical malabsorption 10/25/2019    Chronic obstructive pulmonary disease with acute exacerbation 12/16/2020    Pleuritic chest pain 05/11/2021    Urinary tract infection due to extended-spectrum beta lactamase (ESBL) producing Escherichia coli 05/11/2021    PVC's (premature ventricular contractions) 03/16/2022    RSV (acute bronchiolitis due to respiratory syncytial virus) 01/03/2023    Acute respiratory failure with hypoxia 01/04/2023     Past Medical History:   Diagnosis Date    Acute deep vein thrombosis (DVT) of popliteal vein of left lower extremity 03/24/2020    Bladder disorder     Bleeding hemorrhoids     Chronic kidney disease     Community acquired pneumonia     COPD (chronic obstructive pulmonary disease)     Deep venous thrombosis 2006, 2008    Depression     Esophageal carcinoma 12/31/2014    Hemorrhoids     HH (hiatus hernia)     History of atrial  fibrillation 2015    History of kidney stones     History of nephrolithiasis     History of pancreatitis     History of radiation therapy     History of transfusion     Long-term (current) use of anticoagulants, INR goal 2.0-3.0     Restless legs syndrome     Sleep apnea     Squamous carcinoma        The patient has started, but not completed, their COVID-19 vaccination series.    PAST SURGICAL HISTORY  Past Surgical History:   Procedure Laterality Date    APPENDECTOMY  1950    BRONCHOSCOPY      (Diagnostic)    CARDIAC CATHETERIZATION N/A 5/14/2022    Procedure: Left Heart Cath;  Surgeon: Eric So MD;  Location: [facility] CATH INVASIVE LOCATION;  Service: Cardiology;  Laterality: N/A;    CARDIAC CATHETERIZATION N/A 5/14/2022    Procedure: Coronary angiography;  Surgeon: Eric So MD;  Location: [facility] CATH INVASIVE LOCATION;  Service: Cardiology;  Laterality: N/A;    CARDIAC CATHETERIZATION N/A 5/14/2022    Procedure: Left ventriculography;  Surgeon: Eric So MD;  Location: [facility] CATH INVASIVE LOCATION;  Service: Cardiology;  Laterality: N/A;    CATARACT EXTRACTION Bilateral 2014    COLONOSCOPY  12/15/2014    Complete / Description: EH, IH, torts, stool, follow-up colonoscopy due in 5 years.    COLONOSCOPY N/A 6/13/2017    non-thrombosed external hemorrhoids, normal examined ileum, IH    COLONOSCOPY N/A 2/26/2019    Procedure: COLONOSCOPY with Cold Polypectomy;  Surgeon: Mulugeta Millan MD;  Location: [facility] ENDOSCOPY;  Service: Gastroenterology    COLONOSCOPY N/A 12/16/2020    Procedure: COLONOSCOPY to cecum into TI;  Surgeon: Mesha Sanchez MD;  Location: [facility] ENDOSCOPY;  Service: Gastroenterology;  Laterality: N/A;  pre: lower GI bleed  post: hemorrhoids     CYSTOSCOPY W/ LASER LITHOTRIPSY      ENDOSCOPY N/A 6/13/2017    Procedure: ESOPHAGOGASTRODUODENOSCOPY WITH COLD BIOPSY;  Surgeon: Lake Gonzalez MD;  Location: [facility] ENDOSCOPY;  Service:     ENDOSCOPY N/A  11/18/2021    Procedure: ESOPHAGOGASTRODUODENOSCOPY WITH  DILATATION WITH FLUOROSCOPY;  Surgeon: Jose Christine III, MD;  Location: [facility] ENDOSCOPY;  Service: Gastroenterology;  Laterality: N/A;  Pre: dysphagia, h/x of adinocarcinoma of esophagus  Post: same    ESOPHAGECTOMY      April 2015, stage IIB esophageal carcinoma, sub-total resection.    ESOPHAGECTOMY      Esophagectomy Subtotal [place] Procedure    EXCISION LESION  08/2012    Removal of Squamous Cell CA on Head    HAMMER TOE REPAIR  09/2014    Hammertoe Operation (Each Toe), 10/2014    HAMMER TOE REPAIR Left 10/3/2017    Procedure: Left second third and fourth distal interphalangeal joint resection with flexor tenotomy;  Surgeon: Mulugeta Lira MD;  Location:  [facility];  Service:     HEMORRHOIDECTOMY N/A 12/23/2020    Procedure: HEMORRHOIDECTOMY;  Surgeon: Billy Julio Jr., MD;  Location: [facility] MAIN OR;  Service: General;  Laterality: N/A;    HERNIA REPAIR      incisional    JEJUNOSTOMY      Laparoscopic    JEJUNOSTOMY      tube removal     KNEE SURGERY Bilateral 1967, 1973, 1981    PATELLA SURGERY Left     removed    PILONIDAL CYST / SINUS EXCISION      SC ARTHRP KNE CONDYLE&PLATU MEDIAL&LAT COMPARTMENTS Right 3/26/2018    Procedure: TOTAL KNEE ARTHROPLASTY;  Surgeon: Renny Solis MD;  Location: [facility] MAIN OR;  Service: Orthopedics    PROSTATECTOMY  2010    PYLOROPLASTY      SIGMOIDOSCOPY N/A 8/2/2023    Procedure: SIGMOIDOSCOPY FLEXIBLE;  Surgeon: Mesha Sanchez MD;  Location: [facility] ENDOSCOPY;  Service: Gastroenterology;  Laterality: N/A;  PRE- ABNORMAL CT  POST- HEMORRHOIDS, ULCERATION    SPINAL FUSION  02/1998    C 5,6    TONSILLECTOMY      UPPER GASTROINTESTINAL ENDOSCOPY  12/15/2014    LA Grade D esophagitis, Pardo's, HH, multiple duodenal ulcers    VENTRAL/INCISIONAL HERNIA REPAIR N/A 4/14/2016    Procedure: VENTRAL/INCISIONAL HERNIA REPAIR, open, with mesh, and component separation;  Surgeon: Darren Bunn  "MD Rob;  Location: [facility] OR;  Service:          FAMILY HISTORY  Family History   Problem Relation Age of Onset    Cerebral aneurysm Mother         cerebral artery aneurysm ( age 56)    Anxiety disorder Father     Suicide Attempts Father          of suicide    Cancer Father         bladder    Prostate cancer Brother 68    No Known Problems Brother     Pancreatic cancer Nephew     Colon cancer Neg Hx     Esophageal cancer Neg Hx     Dementia Neg Hx     Malig Hyperthermia Neg Hx          SOCIAL HISTORY  Social History     Socioeconomic History    Marital status:      Spouse name: Lena    Number of children: 0    Years of education: College   Tobacco Use    Smoking status: Former     Packs/day: 2.00     Years: 3.00     Pack years: 6.00     Types: Cigarettes     Quit date:      Years since quittin.6    Smokeless tobacco: Former     Types: Chew    Tobacco comments:     Caffeine - coffee and soda    Vaping Use    Vaping Use: Never used   Substance and Sexual Activity    Alcohol use: Yes     Alcohol/week: 3.0 standard drinks     Types: 1 Glasses of wine, 1 Cans of beer, 1 Shots of liquor per week     Comment: 2    Drug use: Never     Comment: hx marijuana use \"a long time ago\"    Sexual activity: Defer     Partners: Male         ALLERGIES  Patient has no known allergies.        REVIEW OF SYSTEMS  Review of Systems   Constitutional:  Negative for chills and fever.   Cardiovascular:  Negative for chest pain.   Gastrointestinal:  Positive for nausea. Negative for abdominal pain.        \"Retching\"   Genitourinary:  Positive for decreased urine volume. Negative for dysuria.      All systems reviewed and negative except for those discussed in HPI.       PHYSICAL EXAM    I have reviewed the triage vital signs and nursing notes.    ED Triage Vitals [23 1346]   Temp Heart Rate Resp BP SpO2   96.1 øF (35.6 øC) 60 16 109/57 98 %      Temp src Heart Rate Source Patient Position BP Location FiO2 " (%)   Tympanic Monitor Sitting -- --       Physical Exam  GENERAL: alert, no acute distress  SKIN: Warm, dry  HENT: Normocephalic, atraumatic  EYES: no scleral icterus  CV: regular rhythm, regular rate  RESPIRATORY: normal effort, lungs clear  ABDOMEN: soft, nontender, nondistended, indwelling lama catheter  MUSCULOSKELETAL: no deformity  NEURO: alert, moves all extremities, follows commands          LAB RESULTS  Recent Results (from the past 24 hour(s))   Comprehensive Metabolic Panel    Collection Time: 08/05/23  2:27 PM    Specimen: Blood   Result Value Ref Range    Glucose 109 (H) 65 - 99 mg/dL    BUN 20 8 - 23 mg/dL    Creatinine 1.11 0.76 - 1.27 mg/dL    Sodium 135 (L) 136 - 145 mmol/L    Potassium 4.7 3.5 - 5.2 mmol/L    Chloride 105 98 - 107 mmol/L    CO2 24.0 22.0 - 29.0 mmol/L    Calcium 8.7 8.6 - 10.5 mg/dL    Total Protein 6.0 6.0 - 8.5 g/dL    Albumin 3.3 (L) 3.5 - 5.2 g/dL    ALT (SGPT) 10 1 - 41 U/L    AST (SGOT) 14 1 - 40 U/L    Alkaline Phosphatase 80 39 - 117 U/L    Total Bilirubin 0.2 0.0 - 1.2 mg/dL    Globulin 2.7 gm/dL    A/G Ratio 1.2 g/dL    BUN/Creatinine Ratio 18.0 7.0 - 25.0    Anion Gap 6.0 5.0 - 15.0 mmol/L    eGFR 69.2 >60.0 mL/min/1.73   Magnesium    Collection Time: 08/05/23  2:27 PM    Specimen: Blood   Result Value Ref Range    Magnesium 2.1 1.6 - 2.4 mg/dL   Phosphorus    Collection Time: 08/05/23  2:27 PM    Specimen: Blood   Result Value Ref Range    Phosphorus 2.3 (L) 2.5 - 4.5 mg/dL   CBC Auto Differential    Collection Time: 08/05/23  2:27 PM    Specimen: Blood   Result Value Ref Range    WBC 3.69 3.40 - 10.80 10*3/mm3    RBC 3.77 (L) 4.14 - 5.80 10*6/mm3    Hemoglobin 11.1 (L) 13.0 - 17.7 g/dL    Hematocrit 33.2 (L) 37.5 - 51.0 %    MCV 88.1 79.0 - 97.0 fL    MCH 29.4 26.6 - 33.0 pg    MCHC 33.4 31.5 - 35.7 g/dL    RDW 13.7 12.3 - 15.4 %    RDW-SD 43.4 37.0 - 54.0 fl    MPV 9.3 6.0 - 12.0 fL    Platelets 146 140 - 450 10*3/mm3    Neutrophil % 70.4 42.7 - 76.0 %    Lymphocyte  % 16.5 (L) 19.6 - 45.3 %    Monocyte % 9.8 5.0 - 12.0 %    Eosinophil % 2.7 0.3 - 6.2 %    Basophil % 0.3 0.0 - 1.5 %    Immature Grans % 0.3 0.0 - 0.5 %    Neutrophils, Absolute 2.60 1.70 - 7.00 10*3/mm3    Lymphocytes, Absolute 0.61 (L) 0.70 - 3.10 10*3/mm3    Monocytes, Absolute 0.36 0.10 - 0.90 10*3/mm3    Eosinophils, Absolute 0.10 0.00 - 0.40 10*3/mm3    Basophils, Absolute 0.01 0.00 - 0.20 10*3/mm3    Immature Grans, Absolute 0.01 0.00 - 0.05 10*3/mm3    nRBC 0.0 0.0 - 0.2 /100 WBC   Urinalysis With Microscopic If Indicated (No Culture) - Urine, Catheter    Collection Time: 08/05/23  2:52 PM    Specimen: Urine, Catheter   Result Value Ref Range    Color, UA Yellow Yellow, Straw    Appearance, UA Clear Clear    pH, UA 5.5 5.0 - 8.0    Specific Gravity, UA 1.021 1.005 - 1.030    Glucose, UA Negative Negative    Ketones, UA Negative Negative    Bilirubin, UA Negative Negative    Blood, UA Negative Negative    Protein, UA 30 mg/dL (1+) (A) Negative    Leuk Esterase, UA Small (1+) (A) Negative    Nitrite, UA Negative Negative    Urobilinogen, UA 0.2 E.U./dL 0.2 - 1.0 E.U./dL   Urinalysis, Microscopic Only - Urine, Catheter    Collection Time: 08/05/23  2:52 PM    Specimen: Urine, Catheter   Result Value Ref Range    RBC, UA 0-2 None Seen, 0-2 /HPF    WBC, UA 6-12 (A) None Seen, 0-2 /HPF    Bacteria, UA None Seen None Seen /HPF    Squamous Epithelial Cells, UA 0-2 None Seen, 0-2 /HPF    Hyaline Casts, UA None Seen None Seen /LPF    WBC Clumps, UA Small/1+ None Seen /HPF    Methodology Manual Light Microscopy        Ordered the above labs and independently reviewed and interpreted the results.        RADIOLOGY  XR Chest 1 View    Result Date: 8/5/2023  AP CHEST  HISTORY: Nausea and fatigue  COMPARISON: 07/29/2023  FINDINGS: Stable changes of esophagectomy and gastric pull-through. Stable minimal right pleural effusion. No new infiltrates are seen in the left lung. Heart size stable. Postoperative changes of the  cervical spine      Stable exam  This report was finalized on 8/5/2023 2:40 PM by Dr. Miguel Hogan M.D.       I ordered the above noted radiological studies. Independently reviewed and interpreted by me.  See dictation for official radiology interpretation.      PROCEDURES    Procedures      MEDICATIONS GIVEN IN ER    Medications   sodium chloride 0.9 % flush 10 mL (has no administration in time range)   sodium chloride 0.9 % flush 10 mL (has no administration in time range)   sodium chloride 0.9 % flush 10 mL (has no administration in time range)   sodium chloride 0.9 % infusion 40 mL (has no administration in time range)   sennosides-docusate (PERICOLACE) 8.6-50 MG per tablet 2 tablet (has no administration in time range)     And   polyethylene glycol (MIRALAX) packet 17 g (has no administration in time range)     And   bisacodyl (DULCOLAX) EC tablet 5 mg (has no administration in time range)     And   bisacodyl (DULCOLAX) suppository 10 mg (has no administration in time range)   Potassium Replacement - Follow Nurse / BPA Driven Protocol (has no administration in time range)   Magnesium Standard Dose Replacement - Follow Nurse / BPA Driven Protocol (has no administration in time range)   Phosphorus Replacement - Follow Nurse / BPA Driven Protocol (has no administration in time range)   Calcium Replacement - Follow Nurse / BPA Driven Protocol (has no administration in time range)   acetaminophen (TYLENOL) tablet 650 mg (has no administration in time range)     Or   acetaminophen (TYLENOL) 160 MG/5ML solution 650 mg (has no administration in time range)     Or   acetaminophen (TYLENOL) suppository 650 mg (has no administration in time range)   ondansetron (ZOFRAN) tablet 4 mg (has no administration in time range)     Or   ondansetron (ZOFRAN) injection 4 mg (has no administration in time range)   albuterol (PROVENTIL) nebulizer solution 0.083% 2.5 mg/3mL (has no administration in time range)   atorvastatin  (LIPITOR) tablet 40 mg (has no administration in time range)   bisacodyl (DULCOLAX) EC tablet 5 mg (has no administration in time range)   cefdinir (OMNICEF) capsule 300 mg (has no administration in time range)   apixaban (ELIQUIS) tablet 5 mg (has no administration in time range)   furosemide (LASIX) tablet 40 mg (has no administration in time range)   metoprolol tartrate (LOPRESSOR) tablet 50 mg (has no administration in time range)   metroNIDAZOLE (FLAGYL) tablet 500 mg (has no administration in time range)   pantoprazole (PROTONIX) EC tablet 40 mg (has no administration in time range)   PARoxetine (PAXIL) tablet 40 mg (has no administration in time range)   polyethylene glycol (MIRALAX) packet 17 g (has no administration in time range)   pramipexole (MIRAPEX) tablet 1.5 mg (has no administration in time range)   tamsulosin (FLOMAX) 24 hr capsule 0.4 mg (has no administration in time range)   tiotropium (SPIRIVA RESPIMAT) 2.5 mcg/act aerosol solution inhaler (2 puffs Inhalation Not Given 8/5/23 1937)   potassium chloride (K-DUR,KLOR-CON) ER tablet 10 mEq (has no administration in time range)   sodium chloride 0.9 % bolus 500 mL (0 mL Intravenous Stopped 8/5/23 1833)         PROGRESS, DATA ANALYSIS, CONSULTS, AND MEDICAL DECISION MAKING    All labs have been independently reviewed and interpreted by me.  All radiology studies have been independently reviewed and interpreted by me and discussed with radiologist dictating the report.   EKG's independently reviewed and interpreted by me.  Discussion below represents my analysis of pertinent findings related to patient's condition, differential diagnosis, treatment plan and final disposition.    Differential diagnosis: gastritis, esophagitis, gastroparesis, viral illness, electrolyte abnormality, ALEC, UTI    ED Course as of 08/05/23 2002   Sat Aug 05, 2023   1436 XR Chest 1 View  My independent interpretation of the chest x-ray is no pneumothorax [TR]   1450 WBC: 3.69  "[DC]   1450 Hemoglobin(!): 11.1  Chronic, no change from 2 days ago [DC]   1540 Messages from the home health nurse from today:    \"Unable to eat or take medication in 24 hours, dark urine, during my admission I had him eat some food after 15 mins he started to become nauseated and dry heaving. Pressure was 90's systolic, pale. High risk for dehydration over the weekend.      The dry heaving continuous is what made him go to the ER last weekend.\" [TR]   1618 After discussing with patient and spouse at bedside, spouse does not feel comfortable taking patient home.  They have been home for less than 24 hours and the patient has not been eating, drinking, or taking his medications.  When he has tried to eat he is began having retching episodes.  They state when he was in the hospital he was tolerating p.o. without difficulty and without having these episodes.  There is some concern from his spouse due to to his to his history of esophageal cancer and esophagectomy for upper GI problem.  We will plan to admit to the hospital for IV fluids and further evaluation. [DC]   1636 Discussed case with Dr. Oglesby ZEINAB, who will admit the patient. [DC]      ED Course User Index  [DC] Alee Davidson PA  [TR] Escobar Duvall MD           PPE: Patient was placed in face mask in first look. Patient was wearing facemask when I entered the room and throughout our encounter. I wore full protective equipment throughout this patient encounter including a face mask, and gloves. Hand hygiene was performed before donning protective equipment and after removal when leaving the room.        AS OF 20:02 EDT VITALS:    BP - 134/71  HR - 58  TEMP - 97.5 øF (36.4 øC) (Oral)  O2 SATS - 100%        DIAGNOSIS  Final diagnoses:   Nausea   Dehydration         DISPOSITION  ED Disposition       ED Disposition   Decision to Admit    Condition   --    Comment   Level of Care: Telemetry [5]   Diagnosis: Nausea [201717]   Admitting Physician: STACEY OGLESBY " [[id number]]   Attending Physician: STACEY RECINOS [[id number]]                    Note Disclaimer: At [facility], we believe that sharing information builds trust and better relationships. You are receiving this note because you recently visited [facility]. It is possible you will see health information before a provider has talked with you about it. This kind of information can be easy to misunderstand. To help you fully understand what it means for your health, we urge you to discuss this note with your provider.         Alee Davidson PA  08/05/23 2004

## 2023-08-06 ENCOUNTER — HOSPITAL ENCOUNTER (OUTPATIENT)
Facility: HOSPITAL | Age: 73
Setting detail: HOSPITAL OUTPATIENT SURGERY
DRG: 368 | End: 2023-08-06
Attending: INTERNAL MEDICINE | Admitting: INTERNAL MEDICINE
Payer: MEDICARE

## 2023-08-06 VITALS
WEIGHT: 180 LBS | BODY MASS INDEX: 21.25 KG/M2 | OXYGEN SATURATION: 98 % | TEMPERATURE: 98.1 F | HEART RATE: 61 BPM | SYSTOLIC BLOOD PRESSURE: 90 MMHG | RESPIRATION RATE: 15 BRPM | DIASTOLIC BLOOD PRESSURE: 60 MMHG | HEIGHT: 77 IN

## 2023-08-06 PROBLEM — R63.4 WEIGHT LOSS: Status: ACTIVE | Noted: 2023-08-06

## 2023-08-06 LAB
ANION GAP SERPL CALCULATED.3IONS-SCNC: 5 MMOL/L (ref 5–15)
BASOPHILS # BLD AUTO: 0.02 10*3/MM3 (ref 0–0.2)
BASOPHILS NFR BLD AUTO: 0.6 % (ref 0–1.5)
BUN SERPL-MCNC: 18 MG/DL (ref 8–23)
BUN/CREAT SERPL: 18.8 (ref 7–25)
CALCIUM SPEC-SCNC: 8.6 MG/DL (ref 8.6–10.5)
CHLORIDE SERPL-SCNC: 105 MMOL/L (ref 98–107)
CO2 SERPL-SCNC: 27 MMOL/L (ref 22–29)
CREAT SERPL-MCNC: 0.96 MG/DL (ref 0.76–1.27)
DEPRECATED RDW RBC AUTO: 44.2 FL (ref 37–54)
EGFRCR SERPLBLD CKD-EPI 2021: 82.4 ML/MIN/1.73
EOSINOPHIL # BLD AUTO: 0.17 10*3/MM3 (ref 0–0.4)
EOSINOPHIL NFR BLD AUTO: 5.3 % (ref 0.3–6.2)
ERYTHROCYTE [DISTWIDTH] IN BLOOD BY AUTOMATED COUNT: 13.8 % (ref 12.3–15.4)
GLUCOSE SERPL-MCNC: 91 MG/DL (ref 65–99)
HCT VFR BLD AUTO: 31 % (ref 37.5–51)
HGB BLD-MCNC: 10.2 G/DL (ref 13–17.7)
IMM GRANULOCYTES # BLD AUTO: 0 10*3/MM3 (ref 0–0.05)
IMM GRANULOCYTES NFR BLD AUTO: 0 % (ref 0–0.5)
LYMPHOCYTES # BLD AUTO: 0.75 10*3/MM3 (ref 0.7–3.1)
LYMPHOCYTES NFR BLD AUTO: 23.2 % (ref 19.6–45.3)
MAGNESIUM SERPL-MCNC: 2.2 MG/DL (ref 1.6–2.4)
MCH RBC QN AUTO: 29.3 PG (ref 26.6–33)
MCHC RBC AUTO-ENTMCNC: 32.9 G/DL (ref 31.5–35.7)
MCV RBC AUTO: 89.1 FL (ref 79–97)
MONOCYTES # BLD AUTO: 0.32 10*3/MM3 (ref 0.1–0.9)
MONOCYTES NFR BLD AUTO: 9.9 % (ref 5–12)
NEUTROPHILS NFR BLD AUTO: 1.97 10*3/MM3 (ref 1.7–7)
NEUTROPHILS NFR BLD AUTO: 61 % (ref 42.7–76)
NRBC BLD AUTO-RTO: 0 /100 WBC (ref 0–0.2)
PHOSPHATE SERPL-MCNC: 2.1 MG/DL (ref 2.5–4.5)
PHOSPHATE SERPL-MCNC: 2.2 MG/DL (ref 2.5–4.5)
PLATELET # BLD AUTO: 149 10*3/MM3 (ref 140–450)
PMV BLD AUTO: 9.9 FL (ref 6–12)
POTASSIUM SERPL-SCNC: 4.4 MMOL/L (ref 3.5–5.2)
RBC # BLD AUTO: 3.48 10*6/MM3 (ref 4.14–5.8)
SODIUM SERPL-SCNC: 137 MMOL/L (ref 136–145)
WBC NRBC COR # BLD: 3.23 10*3/MM3 (ref 3.4–10.8)

## 2023-08-06 PROCEDURE — 84100 ASSAY OF PHOSPHORUS: CPT | Performed by: STUDENT IN AN ORGANIZED HEALTH CARE EDUCATION/TRAINING PROGRAM

## 2023-08-06 PROCEDURE — 80048 BASIC METABOLIC PNL TOTAL CA: CPT | Performed by: STUDENT IN AN ORGANIZED HEALTH CARE EDUCATION/TRAINING PROGRAM

## 2023-08-06 PROCEDURE — 99213 OFFICE O/P EST LOW 20 MIN: CPT | Performed by: INTERNAL MEDICINE

## 2023-08-06 PROCEDURE — 85025 COMPLETE CBC W/AUTO DIFF WBC: CPT | Performed by: STUDENT IN AN ORGANIZED HEALTH CARE EDUCATION/TRAINING PROGRAM

## 2023-08-06 PROCEDURE — 83735 ASSAY OF MAGNESIUM: CPT | Performed by: STUDENT IN AN ORGANIZED HEALTH CARE EDUCATION/TRAINING PROGRAM

## 2023-08-06 PROCEDURE — 97116 GAIT TRAINING THERAPY: CPT

## 2023-08-06 PROCEDURE — G0378 HOSPITAL OBSERVATION PER HR: HCPCS

## 2023-08-06 PROCEDURE — 97161 PT EVAL LOW COMPLEX 20 MIN: CPT

## 2023-08-06 RX ORDER — SODIUM PHOSPHATE IN 0.9 % NACL 15MMOL/100
15 PLASTIC BAG, INJECTION (ML) INTRAVENOUS ONCE
Status: COMPLETED | OUTPATIENT
Start: 2023-08-06 | End: 2023-08-06

## 2023-08-06 RX ORDER — SODIUM PHOSPHATE IN 0.9 % NACL 15MMOL/100
15 PLASTIC BAG, INJECTION (ML) INTRAVENOUS ONCE
Status: COMPLETED | OUTPATIENT
Start: 2023-08-06 | End: 2023-08-07

## 2023-08-06 RX ADMIN — FUROSEMIDE 40 MG: 40 TABLET ORAL at 08:21

## 2023-08-06 RX ADMIN — APIXABAN 5 MG: 5 TABLET, FILM COATED ORAL at 08:17

## 2023-08-06 RX ADMIN — POLYETHYLENE GLYCOL 3350 17 G: 17 POWDER, FOR SOLUTION ORAL at 08:18

## 2023-08-06 RX ADMIN — METRONIDAZOLE 500 MG: 500 TABLET, FILM COATED ORAL at 17:10

## 2023-08-06 RX ADMIN — CEFDINIR 300 MG: 300 CAPSULE ORAL at 08:18

## 2023-08-06 RX ADMIN — Medication 10 ML: at 21:10

## 2023-08-06 RX ADMIN — PRAMIPEXOLE DIHYDROCHLORIDE 1.5 MG: 1.5 TABLET ORAL at 21:10

## 2023-08-06 RX ADMIN — PANTOPRAZOLE SODIUM 40 MG: 40 TABLET, DELAYED RELEASE ORAL at 06:37

## 2023-08-06 RX ADMIN — SENNOSIDES AND DOCUSATE SODIUM 2 TABLET: 50; 8.6 TABLET ORAL at 08:26

## 2023-08-06 RX ADMIN — SENNOSIDES AND DOCUSATE SODIUM 2 TABLET: 50; 8.6 TABLET ORAL at 21:10

## 2023-08-06 RX ADMIN — METRONIDAZOLE 500 MG: 500 TABLET, FILM COATED ORAL at 08:18

## 2023-08-06 RX ADMIN — POTASSIUM CHLORIDE 10 MEQ: 750 TABLET, EXTENDED RELEASE ORAL at 08:17

## 2023-08-06 RX ADMIN — FUROSEMIDE 40 MG: 40 TABLET ORAL at 17:10

## 2023-08-06 RX ADMIN — Medication 10 ML: at 08:18

## 2023-08-06 RX ADMIN — SODIUM PHOSPHATE, MONOBASIC, MONOHYDRATE AND SODIUM PHOSPHATE, DIBASIC, ANHYDROUS 15 MMOL: 276; 142 INJECTION, SOLUTION INTRAVENOUS at 11:23

## 2023-08-06 RX ADMIN — PRAMIPEXOLE DIHYDROCHLORIDE 1.5 MG: 1.5 TABLET ORAL at 08:18

## 2023-08-06 RX ADMIN — PAROXETINE HYDROCHLORIDE HEMIHYDRATE 40 MG: 20 TABLET, FILM COATED ORAL at 06:36

## 2023-08-06 RX ADMIN — APIXABAN 5 MG: 5 TABLET, FILM COATED ORAL at 21:10

## 2023-08-06 RX ADMIN — BISACODYL 5 MG: 5 TABLET ORAL at 08:17

## 2023-08-06 RX ADMIN — ATORVASTATIN CALCIUM 40 MG: 20 TABLET, FILM COATED ORAL at 21:10

## 2023-08-06 NOTE — PLAN OF CARE
Goal Outcome Evaluation:  Plan of Care Reviewed With: patient           Outcome Evaluation: Pt readmitted to [facility] following recent hospital stay. Pt able to perform bed mobility and transfers with SBA and ambulated 150' with Rwx and CGA. Pt requires cues for erect posture and safety with Rwx per trunk forward flexion. Pt with dec jf and poor L foot clearance at times. Pt would benefit from skilled PT to address the deficits and plans to D/C home with HHPT as needed.      Anticipated Discharge Disposition (PT): home with assist, home with home health

## 2023-08-06 NOTE — PLAN OF CARE
Goal Outcome Evaluation:  Plan of Care Reviewed With: patient           Outcome Evaluation: No complaints this shift, King in place, catheter care provided. SB on monitor. Q2 turns. BP low side. Will continue to monitor.

## 2023-08-06 NOTE — CONSULTS
Nutrition Services    Patient Name:  Jose Hurtado  YOB: 1950  MRN: [id number]  Admit Date:  8/5/2023  Assessment Date:  08/06/23    Comment: Consult for chronic poor intake     Received consult for chronic poor intake. Healthy heart diet initiated with 100% x 1 meal. Will add ONS BID per pt preference from recent admission. Weight history reviewed with 40 lb (19%) weight loss past 6 months. Meets criteria for severe malnutrition related to chronic disease. Will complete NFPE at later date.     Recommendations:   Continue current diet and encourage adequate PO intake PRN.   Add Boost Plus BID with meals for additional nutrition support.     RD will continue to follow course for PO intake and tolerance.     CLINICAL NUTRITION ASSESSMENT      Reason for Assessment Chronic Poor Intake      Diagnosis/Problem   Nausea    Medical/Surgical History Past Medical History:   Diagnosis Date    Acute deep vein thrombosis (DVT) of popliteal vein of left lower extremity 03/24/2020    Anemia     Anti-phospholipid syndrome     On lifelong anticoagulation therapy    Bladder disorder     LEAKAGE   ON MED  wers pads    Bleeding hemorrhoids     Chronic kidney disease     Community acquired pneumonia     HISTORY OF IN 2014    Congestive heart failure     COPD (chronic obstructive pulmonary disease)     Deep venous thrombosis 2006, 2008    Left lower extremity multiple    Depression     Esophageal carcinoma 12/31/2014    had chemo and radiation prior surgery    Hemorrhoids     HH (hiatus hernia)     History of atrial fibrillation 2015    ONE EPISODE WHILE HOSPITALIZED    History of kidney stones     History of nephrolithiasis     History of pancreatitis     PT STATES MANY YEARS AGO    History of radiation therapy     History of transfusion     Hypertension     Long-term (current) use of anticoagulants, INR goal 2.0-3.0     Lymphedema     Malignant neoplasm of prostate     Other hyperlipidemia 01/30/2018 January 30, 2018  lipid panel risk 12.8%    Restless legs syndrome     Sleep apnea     OCCASIONALLY WEARS CPAP    Squamous carcinoma     on the head       Past Surgical History:   Procedure Laterality Date    APPENDECTOMY  1950    BRONCHOSCOPY      (Diagnostic)    CARDIAC CATHETERIZATION N/A 5/14/2022    Procedure: Left Heart Cath;  Surgeon: Eric So MD;  Location: [facility] CATH INVASIVE LOCATION;  Service: Cardiology;  Laterality: N/A;    CARDIAC CATHETERIZATION N/A 5/14/2022    Procedure: Coronary angiography;  Surgeon: Eric So MD;  [facility] CATH [facility]IVE [facility];  Service: Cardiology;  Laterality: N/A;    CARDIAC CATHETERIZATION N/A 5/14/2022    Procedure: Left ventriculography;  Surgeon: Eric So MD;  Location: [facility] CATH INVASIVE LOCATION;  Service: Cardiology;  Laterality: N/A;    CATARACT EXTRACTION Bilateral 2014    COLONOSCOPY  12/15/2014    Complete / Description: EH, IH, torts, stool, follow-up colonoscopy due in 5 years.    COLONOSCOPY N/A 6/13/2017    non-thrombosed external hemorrhoids, normal examined ileum, IH    COLONOSCOPY N/A 2/26/2019    Procedure: COLONOSCOPY with Cold Polypectomy;  Surgeon: Mulugeta Millan MD;  Location: [facility] ENDOSCOPY;  Service: Gastroenterology    COLONOSCOPY N/A 12/16/2020    Procedure: COLONOSCOPY to cecum into TI;  Surgeon: Mesha Sanchez MD;  Location: [facility] ENDOSCOPY;  Service: Gastroenterology;  Laterality: N/A;  pre: lower GI bleed  post: hemorrhoids     CYSTOSCOPY W/ LASER LITHOTRIPSY      ENDOSCOPY N/A 6/13/2017    Procedure: ESOPHAGOGASTRODUODENOSCOPY WITH COLD BIOPSY;  Surgeon: Lake Gonzalez MD;  Location: [facility] ENDOSCOPY;  Service:     ENDOSCOPY N/A 11/18/2021    Procedure: ESOPHAGOGASTRODUODENOSCOPY WITH  DILATATION WITH FLUOROSCOPY;  Surgeon: Jose Christine III, MD;  Location: [facility] ENDOSCOPY;  Service: Gastroenterology;  Laterality: N/A;  Pre: dysphagia, h/x of adinocarcinoma of esophagus  Post: same     ESOPHAGECTOMY      April 2015, stage IIB esophageal carcinoma, sub-total resection.    ESOPHAGECTOMY      Esophagectomy Subtotal Andrea Joe Procedure    EXCISION LESION  08/2012    Removal of Squamous Cell CA on Head    HAMMER TOE REPAIR  09/2014    Hammertoe Operation (Each Toe), 10/2014    HAMMER TOE REPAIR Left 10/3/2017    Procedure: Left second third and fourth distal interphalangeal joint resection with flexor tenotomy;  Surgeon: Mulugeta Lira MD;  Location: [facility] OR OSC;  Service:     HEMORRHOIDECTOMY N/A 12/23/2020    Procedure: HEMORRHOIDECTOMY;  Surgeon: Billy Julio Jr., MD;  Location: [facility] MAIN OR;  Service: General;  Laterality: N/A;    HERNIA REPAIR      incisional    JEJUNOSTOMY      Laparoscopic    JEJUNOSTOMY      tube removal     KNEE SURGERY Bilateral 1967, 1973, 1981    PATELLA SURGERY Left     removed    PILONIDAL CYST / SINUS EXCISION      WI ARTHRP KNE CONDYLE&PLATU MEDIAL&LAT COMPARTMENTS Right 3/26/2018    Procedure: TOTAL KNEE ARTHROPLASTY;  Surgeon: Renny Solis MD;  Location: [facility] OR;  Service: Orthopedics    PROSTATECTOMY  2010    PYLOROPLASTY      SIGMOIDOSCOPY N/A 8/2/2023    Procedure: SIGMOIDOSCOPY FLEXIBLE;  Surgeon: Mesha Sanchez MD;  Location: [facility] ENDOSCOPY;  Service: Gastroenterology;  Laterality: N/A;  PRE- ABNORMAL CT  POST- HEMORRHOIDS, ULCERATION    SPINAL FUSION  02/1998    C 5,6    TONSILLECTOMY      UPPER GASTROINTESTINAL ENDOSCOPY  12/15/2014    LA Grade D esophagitis, Pardo's, HH, multiple duodenal ulcers    VENTRAL/INCISIONAL HERNIA REPAIR N/A 4/14/2016    Procedure: VENTRAL/INCISIONAL HERNIA REPAIR, open, with mesh, and component separation;  Surgeon: Darren Rivas MD;  Location: [facility] MAIN OR;  Service:         Encounter Information        Nutrition History:     Food Preferences:    Supplements:    Factors Affecting Intake: decreased appetite, nausea     Anthropometrics        Current Height  Current Weight  BMI kg/m2  "Height: 195.6 cm (77\")  Weight: 77.1 kg (170 lb) (08/05/23 1514)  Body mass index is 20.16 kg/mý.   Adjusted BMI (if applicable)    BMI Category Normal/Healthy (18.4 - 24.9)       Admission Weight 170 lb (77 kg)       Ideal Body Weight (IBW) 208 lb (94.5 kg)   Adjusted IBW (if applicable)        Usual Body Weight (UBW) 210 lb (2/2023)   Weight Change/Trend Loss, Amount/Timeframe: 40 lb (19%) weight loss past 6 months per weight history       Weight History Wt Readings from Last 30 Encounters:   08/05/23 1514 77.1 kg (170 lb)   08/02/23 1439 86.2 kg (190 lb)   07/30/23 1145 78.4 kg (172 lb 13.5 oz)   07/29/23 0941 81.6 kg (180 lb)   07/27/23 1015 81.6 kg (180 lb)   06/23/23 1405 85.5 kg (188 lb 6.4 oz)   05/16/23 1253 84.1 kg (185 lb 6.4 oz)   05/12/23 0734 90.7 kg (200 lb)   04/04/23 1323 93 kg (205 lb)   03/22/23 1254 93.4 kg (205 lb 12.8 oz)   02/17/23 1117 96.1 kg (211 lb 12.8 oz)   02/15/23 1044 93.5 kg (206 lb 1.6 oz)   02/10/23 1351 91.2 kg (201 lb)   01/20/23 1058 99.8 kg (220 lb)   01/03/23 0629 95.4 kg (210 lb 6.4 oz)   01/02/23 2331 97.5 kg (215 lb)   12/28/22 1123 104 kg (230 lb 3.2 oz)   12/21/22 1134 102 kg (224 lb)   12/16/22 1041 103 kg (227 lb)   12/05/22 1123 103 kg (227 lb)   11/22/22 1430 99 kg (218 lb 3.2 oz)   10/10/22 1001 107 kg (235 lb)   09/21/22 1126 105 kg (232 lb)   08/30/22 1500 97.1 kg (214 lb)   07/15/22 1035 96.2 kg (212 lb)   05/31/22 1615 94.2 kg (207 lb 11.2 oz)   05/16/22 0952 92.5 kg (204 lb)   05/14/22 0435 93.2 kg (205 lb 6.4 oz)   05/13/22 1147 95.3 kg (210 lb)   05/13/22 0605 96 kg (211 lb 9.6 oz)   05/12/22 2055 96.8 kg (213 lb 4.8 oz)   05/12/22 2002 93.3 kg (205 lb 9.6 oz)   03/30/22 1341 95.3 kg (210 lb)   03/23/22 1243 101 kg (223 lb 3.2 oz)   03/22/22 0933 100 kg (220 lb 14.4 oz)   03/16/22 0857 99.2 kg (218 lb 12.8 oz)   01/13/22 1428 101 kg (223 lb)           --  Tests/Procedures        Tests/Procedures No new tests/procedures     Labs       Pertinent Labs    Results " from last 7 days   Lab Units 08/06/23  0734 08/05/23  1427   SODIUM mmol/L 137 135*   POTASSIUM mmol/L 4.4 4.7   CHLORIDE mmol/L 105 105   CO2 mmol/L 27.0 24.0   BUN mg/dL 18 20   CREATININE mg/dL 0.96 1.11   CALCIUM mg/dL 8.6 8.7   BILIRUBIN mg/dL  --  0.2   ALK PHOS U/L  --  80   ALT (SGPT) U/L  --  10   AST (SGOT) U/L  --  14   GLUCOSE mg/dL 91 109*     Results from last 7 days   Lab Units 08/06/23  0734 08/05/23  1427   MAGNESIUM mg/dL 2.2 2.1   PHOSPHORUS mg/dL 2.1* 2.3*   HEMOGLOBIN g/dL 10.2* 11.1*   HEMATOCRIT % 31.0* 33.2*   WBC 10*3/mm3 3.23* 3.69   ALBUMIN g/dL  --  3.3*     Results from last 7 days   Lab Units 08/06/23  0734 08/05/23  1427 08/03/23  0716   PLATELETS 10*3/mm3 149 146 154     COVID19   Date Value Ref Range Status   01/03/2023 Not Detected Not Detected - Ref. Range Final     Lab Results   Component Value Date    HGBA1C 5.50 08/06/2021          Medications           Scheduled Medications apixaban, 5 mg, Oral, Q12H  atorvastatin, 40 mg, Oral, Nightly  bisacodyl, 5 mg, Oral, Daily  furosemide, 40 mg, Oral, BID  metoprolol tartrate, 50 mg, Oral, BID  metroNIDAZOLE, 500 mg, Oral, TID  pantoprazole, 40 mg, Oral, Q AM  PARoxetine, 40 mg, Oral, QAM  polyethylene glycol, 17 g, Oral, Daily  potassium chloride, 10 mEq, Oral, Daily  pramipexole, 1.5 mg, Oral, BID  senna-docusate sodium, 2 tablet, Oral, BID  sodium chloride, 10 mL, Intravenous, Q12H  tamsulosin, 0.4 mg, Oral, Daily  tiotropium bromide monohydrate, 2 puff, Inhalation, Daily       Infusions     PRN Medications   acetaminophen **OR** acetaminophen **OR** acetaminophen    albuterol    senna-docusate sodium **AND** polyethylene glycol **AND** bisacodyl **AND** bisacodyl    Calcium Replacement - Follow Nurse / BPA Driven Protocol    Magnesium Standard Dose Replacement - Follow Nurse / BPA Driven Protocol    ondansetron **OR** ondansetron    Phosphorus Replacement - Follow Nurse / BPA Driven Protocol    Potassium Replacement - Follow Nurse /  BPA Driven Protocol    [COMPLETED] Insert Peripheral IV **AND** sodium chloride    sodium chloride    sodium chloride     Physical Findings          Physical Appearance alert, oriented, room air   Oral/Mouth Cavity dentures   Edema  no edema   Gastrointestinal nausea, last bowel movement: 8/3   Skin  pressure injury: stage 1 L posterior heel, stage 1 sacral spine   Tubes/Drains/Lines none   NFPE See Malnutrition Severity Assessment   --  Current Nutrition Orders & Evaluation of Intake       Oral Nutrition     Food Allergies NKFA   Current PO Diet Diet: Cardiac Diets; Healthy Heart (2-3 Na+); Texture: Regular Texture (IDDSI 7); Fluid Consistency: Thin (IDDSI 0)   Supplement n/a   PO Evaluation     % PO Intake 100% x 1 meal    # of Days Evaluated 1   --  PES STATEMENT / NUTRITION DIAGNOSIS      Nutrition Dx Problem  Problem: Malnutrition  Etiology: Medical Diagnosis and Factors Affecting Nutrition decreased appetite, generalized weakness  Signs/Symptoms: Report of Minimal PO Intake, Unintended Weight Change, and Report/Observation    Comment:    --  NUTRITION INTERVENTION / PLAN OF CARE      Intervention Goal(s) Nutrition support treatment, Improved nutrition related labs, Meet estimated needs, Disease management/therapy, Establish PO intake, Tolerate PO , Maintain weight, and No significant weight loss         RD Intervention/Action Encourage intake, Follow Tx Progress, Care plan reviewed, and Recommend/ordered:          Prescription/Orders:       PO Diet       Supplements Boost Plus BID with meals      Snacks       Enteral Nutrition       Parenteral Nutrition    New Prescription Ordered? Yes   --      Monitor/Evaluation Per protocol, I&O, PO intake, Supplement intake, Pertinent labs, Weight, Skin status, GI status, Symptoms   Discharge Plan/Needs Pending clinical course   Education Will instruct as appropriate   --    RD to follow per protocol.      Electronically signed by:  Nuha Wallace RD  08/06/23 15:40 EDT

## 2023-08-06 NOTE — PLAN OF CARE
Goal Outcome Evaluation:  Plan of Care Reviewed With: patient           Outcome Evaluation: up with assist X1, replaced phos, no n/v ate well today, will cont to monitor         Problem: Fall Injury Risk  Goal: Absence of Fall and Fall-Related Injury  Outcome: Ongoing, Progressing  Intervention: Identify and Manage Contributors  Recent Flowsheet Documentation  Taken 8/6/2023 1459 by Gabbie Toribio RN  Medication Review/Management: medications reviewed  Taken 8/6/2023 1200 by Gabbie Toribio RN  Medication Review/Management: medications reviewed  Taken 8/6/2023 1018 by Gabbie Toribio RN  Medication Review/Management: medications reviewed  Taken 8/6/2023 0828 by Gabbie Toribio RN  Medication Review/Management: medications reviewed  Intervention: Promote Injury-Free Environment  Recent Flowsheet Documentation  Taken 8/6/2023 1459 by Gabbie Toribio RN  Safety Promotion/Fall Prevention:   activity supervised   assistive device/personal items within reach   clutter free environment maintained   fall prevention program maintained   nonskid shoes/slippers when out of bed   room organization consistent   safety round/check completed  Taken 8/6/2023 1200 by Gabbie Toribio RN  Safety Promotion/Fall Prevention:   activity supervised   assistive device/personal items within reach   clutter free environment maintained   fall prevention program maintained   nonskid shoes/slippers when out of bed   room organization consistent   safety round/check completed  Taken 8/6/2023 1018 by Gabbie Toribio RN  Safety Promotion/Fall Prevention:   activity supervised   assistive device/personal items within reach   clutter free environment maintained   fall prevention program maintained   nonskid shoes/slippers when out of bed   room organization consistent   safety round/check completed  Taken 8/6/2023 0828 by Gabbie Toribio RN  Safety Promotion/Fall Prevention:   activity supervised   assistive device/personal items within  reach   clutter free environment maintained   fall prevention program maintained   nonskid shoes/slippers when out of bed   room organization consistent   safety round/check completed

## 2023-08-06 NOTE — CONSULTS
[facility]  Initial Inpatient Consult Note    Referring Provider: Dr. Brandin Bolton    Reason for Consultation: Dysphagia, history of esophagectomy    Subjective     History of present illness:    73 y.o. male seen by our service in the past and followed in the past by Dr. Jose Christine post esophagectomy for esophageal cancer.  He had intermittent bouts of swallowing difficulty the last episode per his recollection 6 months ago but in the recent past has had more frequent issues with retching and also diminished appetite.  His last upper endoscopy by Dr. Christine per his account was 2 years ago.  He admits to some constipation as well.  His last colonoscopy was performed in December 2020 by Dr. Mesha Sanchez.  He admits to weight loss of 20 pounds over the past several months.  He is on anticoagulant therapy secondary to DVTs.    Past Medical History:  Past Medical History:   Diagnosis Date    Acute deep vein thrombosis (DVT) of popliteal vein of left lower extremity 03/24/2020    Anemia     Anti-phospholipid syndrome     On lifelong anticoagulation therapy    Bladder disorder     LEAKAGE   ON MED  wers pads    Bleeding hemorrhoids     Chronic kidney disease     Community acquired pneumonia     HISTORY OF IN 2014    Congestive heart failure     COPD (chronic obstructive pulmonary disease)     Deep venous thrombosis 2006, 2008    Left lower extremity multiple    Depression     Esophageal carcinoma 12/31/2014    had chemo and radiation prior surgery    Hemorrhoids     HH (hiatus hernia)     History of atrial fibrillation 2015    ONE EPISODE WHILE HOSPITALIZED    History of kidney stones     History of nephrolithiasis     History of pancreatitis     PT STATES MANY YEARS AGO    History of radiation therapy     History of transfusion     Hypertension     Long-term (current) use of anticoagulants, INR goal 2.0-3.0     Lymphedema     Malignant neoplasm of prostate     Other hyperlipidemia 01/30/2018     January 30, 2018 lipid panel risk 12.8%    Restless legs syndrome     Sleep apnea     OCCASIONALLY WEARS CPAP    Squamous carcinoma     on the head     Past Surgical History:  Past Surgical History:   Procedure Laterality Date    APPENDECTOMY  1950    BRONCHOSCOPY      (Diagnostic)    CARDIAC CATHETERIZATION N/A 5/14/2022    Procedure: Left Heart Cath;  Surgeon: Eric So MD;  Location: [facility] CATH INVASIVE LOCATION;  Service: Cardiology;  Laterality: N/A;    CARDIAC CATHETERIZATION N/A 5/14/2022    Procedure: Coronary angiography;  Surgeon: Eric So MD;  Location: [facility] CATH INVASIVE LOCATION;  Service: Cardiology;  Laterality: N/A;    CARDIAC CATHETERIZATION N/A 5/14/2022    Procedure: Left ventriculography;  Surgeon: Eric So MD;  Location: [facility] CATH INVASIVE LOCATION;  Service: Cardiology;  Laterality: N/A;    CATARACT EXTRACTION Bilateral 2014    COLONOSCOPY  12/15/2014    Complete / Description: EH, IH, torts, stool, follow-up colonoscopy due in 5 years.    COLONOSCOPY N/A 6/13/2017    non-thrombosed external hemorrhoids, normal examined ileum, IH    COLONOSCOPY N/A 2/26/2019    Procedure: COLONOSCOPY with Cold Polypectomy;  Surgeon: Mulugeta Millan MD;  Location: [facility] ENDOSCOPY;  Service: Gastroenterology    COLONOSCOPY N/A 12/16/2020    Procedure: COLONOSCOPY to cecum into TI;  Surgeon: Mesha Sanchez MD;  Location: [facility] ENDOSCOPY;  Service: Gastroenterology;  Laterality: N/A;  pre: lower GI bleed  post: hemorrhoids     CYSTOSCOPY W/ LASER LITHOTRIPSY      ENDOSCOPY N/A 6/13/2017    Procedure: ESOPHAGOGASTRODUODENOSCOPY WITH COLD BIOPSY;  Surgeon: Lake Gonzalez MD;  Location: [facility] ENDOSCOPY;  Service:     ENDOSCOPY N/A 11/18/2021    Procedure: ESOPHAGOGASTRODUODENOSCOPY WITH  DILATATION WITH FLUOROSCOPY;  Surgeon: Jose Christine III, MD;  Location: [facility] ENDOSCOPY;  Service: Gastroenterology;  Laterality: N/A;  Pre: dysphagia, h/x of  adinocarcinoma of esophagus  Post: same    ESOPHAGECTOMY      April 2015, stage IIB esophageal carcinoma, sub-total resection.    ESOPHAGECTOMY      Esophagectomy Subtotal Andrea Joe Procedure    EXCISION LESION  08/2012    Removal of Squamous Cell CA on Head    HAMMER TOE REPAIR  09/2014    Hammertoe Operation (Each Toe), 10/2014    HAMMER TOE REPAIR Left 10/3/2017    Procedure: Left second third and fourth distal interphalangeal joint resection with flexor tenotomy;  Surgeon: Mulugeta Lira MD;  Location: [facility] OR OSC;  Service:     HEMORRHOIDECTOMY N/A 12/23/2020    Procedure: HEMORRHOIDECTOMY;  Surgeon: Billy Julio Jr., MD;  Location: [facility] OR;  Service: General;  Laterality: N/A;    HERNIA REPAIR      incisional    JEJUNOSTOMY      Laparoscopic    JEJUNOSTOMY      tube removal     KNEE SURGERY Bilateral 1967, 1973, 1981    PATELLA SURGERY Left     removed    PILONIDAL CYST / SINUS EXCISION      PA ARTHRP KNE CONDYLE&PLATU MEDIAL&LAT COMPARTMENTS Right 3/26/2018    Procedure: TOTAL KNEE ARTHROPLASTY;  Surgeon: Renny Solis MD;  Location: [facility] MAIN OR;  Service: Orthopedics    PROSTATECTOMY  2010    PYLOROPLASTY      SIGMOIDOSCOPY N/A 8/2/2023    Procedure: SIGMOIDOSCOPY FLEXIBLE;  Surgeon: Mesha Sanchez MD;  Location: [facility] ENDOSCOPY;  Service: Gastroenterology;  Laterality: N/A;  PRE- ABNORMAL CT  POST- HEMORRHOIDS, ULCERATION    SPINAL FUSION  02/1998    C 5,6    TONSILLECTOMY      UPPER GASTROINTESTINAL ENDOSCOPY  12/15/2014    LA Grade D esophagitis, Pardo's, HH, multiple duodenal ulcers    VENTRAL/INCISIONAL HERNIA REPAIR N/A 4/14/2016    Procedure: VENTRAL/INCISIONAL HERNIA REPAIR, open, with mesh, and component separation;  Surgeon: Darren Rivas MD;  Location: [facility] MAIN OR;  Service:       Social History:   Social History     Tobacco Use    Smoking status: Former     Packs/day: 2.00     Years: 3.00     Pack years: 6.00     Types: Cigarettes     Quit date: 1974      Years since quittin.6    Smokeless tobacco: Former     Types: Chew    Tobacco comments:     Caffeine - coffee and soda    Substance Use Topics    Alcohol use: Yes     Alcohol/week: 3.0 standard drinks     Types: 1 Glasses of wine, 1 Cans of beer, 1 Shots of liquor per week     Comment: 2      Family History:  Family History   Problem Relation Age of Onset    Cerebral aneurysm Mother         cerebral artery aneurysm ( age 56)    Anxiety disorder Father     Suicide Attempts Father          of suicide    Cancer Father         bladder    Prostate cancer Brother 68    No Known Problems Brother     Pancreatic cancer Nephew     Colon cancer Neg Hx     Esophageal cancer Neg Hx     Dementia Neg Hx     Malig Hyperthermia Neg Hx        Home Meds:  Medications Prior to Admission   Medication Sig Dispense Refill Last Dose    atorvastatin (LIPITOR) 40 MG tablet Take 1 tablet by mouth Every Night. 90 tablet 3     bisacodyl (DULCOLAX) 5 MG EC tablet Take 1 tablet by mouth Daily. 30 tablet 0     cyanocobalamin 1000 MCG/ML injection INJECT 1 ML INTRAMUSCULARLY ONCE EVERY 30 DAYS AS DIRECTED 1 mL 11     Eliquis 5 MG tablet tablet TAKE ONE TABLET BY MOUTH EVERY 12 HOURS 60 tablet 1     furosemide (LASIX) 40 MG tablet Take 1 tablet by mouth Daily.       Gemtesa 75 MG tablet Daily.       metoprolol tartrate (LOPRESSOR) 50 MG tablet Take 1 tablet by mouth 2 (Two) Times a Day for 60 days. 60 tablet 1     metroNIDAZOLE (Flagyl) 500 MG tablet Take 1 tablet by mouth 3 (Three) Times a Day for 2 days. 6 tablet 0     pantoprazole (PROTONIX) 40 MG EC tablet TAKE ONE TABLET BY MOUTH TWICE A DAY BEFORE A MEAL 180 tablet 3     PARoxetine (PAXIL) 40 MG tablet TAKE ONE TABLET BY MOUTH EVERY MORNING 30 tablet 5     polyethylene glycol (MIRALAX) 17 g packet Take 17 g by mouth Daily. 30 each 0     potassium chloride (MICRO-K) 10 MEQ CR capsule Take 1 capsule by mouth Daily. 90 capsule 5     pramipexole (MIRAPEX) 1.5 MG tablet TAKE ONE  TABLET BY MOUTH TWICE A  tablet 1     sennosides-docusate (PERICOLACE) 8.6-50 MG per tablet Take 2 tablets by mouth 2 (Two) Times a Day. 120 tablet 0     tamsulosin (FLOMAX) 0.4 MG capsule 24 hr capsule Take 1 capsule by mouth Daily. 30 capsule 0     tiotropium bromide monohydrate (Spiriva Respimat) 2.5 MCG/ACT aerosol solution inhaler Inhale 2 puffs Daily. 4 g 2     tiotropium bromide-olodaterol (Stiolto Respimat) 2.5-2.5 MCG/ACT aerosol solution inhaler Inhale Daily.       albuterol sulfate  (90 Base) MCG/ACT inhaler Inhale 1 puff Every 4 (Four) Hours As Needed for Wheezing.       [] cefdinir (OMNICEF) 300 MG capsule Take 1 capsule by mouth 2 (Two) Times a Day for 1 day. 2 capsule 0      Current Meds:   apixaban, 5 mg, Oral, Q12H  atorvastatin, 40 mg, Oral, Nightly  bisacodyl, 5 mg, Oral, Daily  furosemide, 40 mg, Oral, BID  metoprolol tartrate, 50 mg, Oral, BID  metroNIDAZOLE, 500 mg, Oral, TID  pantoprazole, 40 mg, Oral, Q AM  PARoxetine, 40 mg, Oral, QAM  polyethylene glycol, 17 g, Oral, Daily  potassium chloride, 10 mEq, Oral, Daily  pramipexole, 1.5 mg, Oral, BID  senna-docusate sodium, 2 tablet, Oral, BID  sodium chloride, 10 mL, Intravenous, Q12H  tamsulosin, 0.4 mg, Oral, Daily  tiotropium bromide monohydrate, 2 puff, Inhalation, Daily      Allergies:  No Known Allergies  Review of Systems  Pertinent items are noted in HPI     Objective     Vital Signs  Temp:  [97.5 øF (36.4 øC)-97.9 øF (36.6 øC)] 97.9 øF (36.6 øC)  Heart Rate:  [45-67] 50  Resp:  [16-18] 16  BP: ()/(47-71) 103/56  Physical Exam:  General Appearance:    Alert, cooperative, in no acute distress   Head:    Normocephalic, without obvious abnormality, atraumatic   Eyes:          conjunctivae and sclerae normal, no   icterus   Throat:   no thrush, oral mucosa moist   Neck:   Supple, no adenopathy   Lungs:     Clear to auscultation bilaterally    Heart:    Regular rhythm and normal rate    Chest Wall:    No  abnormalities observed   Abdomen:     Soft, nondistended, nontender; normal bowel sounds   Extremities:   no edema, no redness   Skin:   No bruising or rash   Psychiatric:  normal mood and insight     Results Review:   I reviewed the patient's new clinical results.    Results from last 7 days   Lab Units 08/06/23  0734 08/05/23  1427 08/03/23  0716   WBC 10*3/mm3 3.23* 3.69 4.59   HEMOGLOBIN g/dL 10.2* 11.1* 10.6*   HEMATOCRIT % 31.0* 33.2* 31.8*   PLATELETS 10*3/mm3 149 146 154     Results from last 7 days   Lab Units 08/06/23  0734 08/05/23  1427   SODIUM mmol/L 137 135*   POTASSIUM mmol/L 4.4 4.7   CHLORIDE mmol/L 105 105   CO2 mmol/L 27.0 24.0   BUN mg/dL 18 20   CREATININE mg/dL 0.96 1.11   CALCIUM mg/dL 8.6 8.7   BILIRUBIN mg/dL  --  0.2   ALK PHOS U/L  --  80   ALT (SGPT) U/L  --  10   AST (SGOT) U/L  --  14   GLUCOSE mg/dL 91 109*         Lab Results   Lab Value Date/Time    LIPASE 25 07/29/2023 1137    LIPASE 18 08/30/2016 2057       Radiology:  XR Chest 1 View   Final Result   Stable exam       This report was finalized on 8/5/2023 2:40 PM by Dr. Miguel Hogan M.D.              Assessment & Plan   Active Hospital Problems    Diagnosis     **Nausea     Other symptoms and signs concerning food and fluid intake     Dysphagia     Severe malnutrition     Acute urinary retention     Chronic obstructive pulmonary disease     CKD (chronic kidney disease) stage 2, GFR 60-89 ml/min     Chronic anticoagulation     History of recurrent deep vein thrombosis (DVT)     Hypertension     History of esophageal cancer        Assessment:  Dysphagia: Known history of esophagectomy for esophageal cancer  Nausea with vomiting: Concern with possible outlet obstruction  Weight loss  Constipation  COPD  CKD  Anticoagulant therapy for DVT    Plan:  Schedule EGD: Somewhat limited while on anticoagulant therapy but warranted to assess for possible outlet obstruction  Consider further assessment for constipation pending upper GI  findings  Encourage p.o. intake as tolerated may warrant nutritional supplements, nutritionist following.      I discussed the patients findings and my recommendations with patient, family, and nursing staff.    Mulugeta Millan MD

## 2023-08-06 NOTE — PROGRESS NOTES
Name: Jose Hurtado ADMIT: 2023   : 1950  PCP: Brandin Bolton MD    MRN: [id number] LOS: 0 days   AGE/SEX: 73 y.o. male  ROOM: [facility]     Subjective   Subjective   Feeling better since admit, nausea improved.     Objective   Objective   Vital Signs  Temp:  [96.1 øF (35.6 øC)-97.5 øF (36.4 øC)] 97.5 øF (36.4 øC)  Heart Rate:  [45-67] 45  Resp:  [16-18] 16  BP: ()/(47-71) 97/47  SpO2:  [94 %-100 %] 97 %  on   ;   Device (Oxygen Therapy): room air  Body mass index is 20.16 kg/mý.  Physical Exam  Constitutional:       General: He is not in acute distress.     Appearance: He is ill-appearing.   Eyes:      Extraocular Movements: Extraocular movements intact.      Pupils: Pupils are equal, round, and reactive to light.   Cardiovascular:      Rate and Rhythm: Normal rate.   Pulmonary:      Effort: Pulmonary effort is normal.      Breath sounds: No stridor.   Abdominal:      General: There is no distension.      Tenderness: There is no abdominal tenderness. There is no guarding or rebound.      Hernia: No hernia is present.   Skin:     Coloration: Skin is not jaundiced.   Neurological:      General: No focal deficit present.      Mental Status: He is alert and oriented to person, place, and time.   Psychiatric:         Mood and Affect: Mood normal.         Behavior: Behavior normal.       Results Review     I reviewed the patient's new clinical results.  Results from last 7 days   Lab Units 23  1427 23  0716 23  0827   WBC 10*3/mm3 3.69 4.59 2.58*   HEMOGLOBIN g/dL 11.1* 10.6* 10.6*   PLATELETS 10*3/mm3 146 154 157     Results from last 7 days   Lab Units 23  1427 23  0827   SODIUM mmol/L 135* 140   POTASSIUM mmol/L 4.7 4.2   CHLORIDE mmol/L 105 107   CO2 mmol/L 24.0 24.6   BUN mg/dL 20 15   CREATININE mg/dL 1.11 1.03   GLUCOSE mg/dL 109* 84   EGFR mL/min/1.73 69.2 75.8     Results from last 7 days   Lab Units 23  1427 23  0827   ALBUMIN g/dL 3.3* 3.1*   BILIRUBIN  mg/dL 0.2 0.3   ALK PHOS U/L 80 78   AST (SGOT) U/L 14 18   ALT (SGPT) U/L 10 13     Results from last 7 days   Lab Units 08/05/23  1427 07/30/23  0827   CALCIUM mg/dL 8.7 8.4*   ALBUMIN g/dL 3.3* 3.1*   MAGNESIUM mg/dL 2.1  --    PHOSPHORUS mg/dL 2.3*  --        No results found for: HGBA1C, POCGLU    XR Chest 1 View    Result Date: 8/5/2023  Stable exam  This report was finalized on 8/5/2023 2:40 PM by Dr. Miguel Hogan M.D.     Scheduled Medications  apixaban, 5 mg, Oral, Q12H  atorvastatin, 40 mg, Oral, Nightly  bisacodyl, 5 mg, Oral, Daily  cefdinir, 300 mg, Oral, BID  furosemide, 40 mg, Oral, BID  metoprolol tartrate, 50 mg, Oral, BID  metroNIDAZOLE, 500 mg, Oral, TID  pantoprazole, 40 mg, Oral, Q AM  PARoxetine, 40 mg, Oral, QAM  polyethylene glycol, 17 g, Oral, Daily  potassium chloride, 10 mEq, Oral, Daily  pramipexole, 1.5 mg, Oral, BID  senna-docusate sodium, 2 tablet, Oral, BID  sodium chloride, 10 mL, Intravenous, Q12H  tamsulosin, 0.4 mg, Oral, Daily  tiotropium bromide monohydrate, 2 puff, Inhalation, Daily    Infusions   Diet  Diet: Cardiac Diets; Healthy Heart (2-3 Na+); Texture: Regular Texture (IDDSI 7); Fluid Consistency: Thin (IDDSI 0)       Assessment/Plan     Active Hospital Problems    Diagnosis  POA    **Nausea [R11.0]  Yes    Other symptoms and signs concerning food and fluid intake [R63.8]  Yes    Dysphagia [R13.10]  Yes    Severe malnutrition [E43]  Yes    Acute urinary retention [R33.8]  Yes    Chronic obstructive pulmonary disease [J44.9]  Yes    CKD (chronic kidney disease) stage 2, GFR 60-89 ml/min [N18.2]  Yes    Chronic anticoagulation [Z79.01]  Not Applicable    History of recurrent deep vein thrombosis (DVT) [Z86.718]  Not Applicable    Hypertension [I10]  Yes    History of esophageal cancer [Z85.01]  Yes      Resolved Hospital Problems   No resolved problems to display.       73 y.o. male admitted with Nausea.      08/06/23  GI consult pending.    N/V  Severe  malnutrition  Dysphagia  Esophageal CA (s/p esophagectomy)  -SLP, Nutrition consults  -GI    Intraabdominal infection  -From recent hospital admission, seen on CT w/ colitis/proctitis  -Flex sig on 8/2 with 2 small ulcers in rectum and rectosigmoid colon, nonbleeding internal hemorrhoids  -Continue cefdinir and Flagyl to complete 7 days    Acute urinary retention  -Failed voiding trial prior to discharge, King catheter in place  -OP follow-up with urology    COPD  -Symbicort, Spiriva  -DuoNebs   -prednisone 40mg daily x5d     HLD  -atorvastatin 40mg qhs     Afib w/ RVR  -RC: metoprolol 50mg BID  -AC: apixaban     Hx DVT  APL syndrome  -apixaban as above     Depression  -Paxil 40mg qam    Chronic myocardial injury  -trop 59 > 61; no further ischemic eval indicated at this time      Eliquis (home med) for DVT prophylaxis.  Discussed with patient.  Anticipate discharge home when cleared by consultants      Carloz Ortiz MD  [facility]  08/06/23  08:11 EDT

## 2023-08-06 NOTE — THERAPY EVALUATION
Patient Name: Jose Hurtado  : 1950    MRN: [id number]                              Today's Date: 2023       Admit Date: 2023    Visit Dx:     ICD-10-CM ICD-9-CM   1. Nausea  R11.0 787.02   2. Dehydration  E86.0 276.51     Patient Active Problem List   Diagnosis    History of esophageal cancer    Adenomatous polyp of colon    Malignant neoplasm of prostate    RLS (restless legs syndrome)    History of DVT (deep vein thrombosis)    Recurrent major depressive disorder, in partial remission    Hypertension    Anemia    Insomnia due to other mental disorder    Peripheral polyneuropathy    B12 deficiency    Lymphedema    Iron deficiency    Gastroparesis    History of recurrent deep vein thrombosis (DVT)    Tremor of both hands    Gastrointestinal hemorrhage    Acute blood loss anemia    Pancytopenia    Chronic venous hypertension due to DVT    Bleeding hemorrhoid    Malabsorption of iron    Iron deficiency anemia    History of esophageal cancer    Abnormal CT scan    Generalized weakness    Chronic anticoagulation    CKD (chronic kidney disease) stage 2, GFR 60-89 ml/min    Dysphagia    NSTEMI (non-ST elevated myocardial infarction)    Bronchitis    Dyspnea    Elevated troponin    Atrial fibrillation    Chronic obstructive pulmonary disease    Congestive heart failure    Gait instability    Dark stools    Proctitis    Nausea and vomiting    Acute urinary retention    Severe malnutrition    Nausea    Other symptoms and signs concerning food and fluid intake    Dysphagia     Past Medical History:   Diagnosis Date    Acute deep vein thrombosis (DVT) of popliteal vein of left lower extremity 2020    Anemia     Anti-phospholipid syndrome     On lifelong anticoagulation therapy    Bladder disorder     LEAKAGE   ON MED  wers pads    Bleeding hemorrhoids     Chronic kidney disease     Community acquired pneumonia     HISTORY OF IN     Congestive heart failure     COPD (chronic obstructive pulmonary  disease)     Deep venous thrombosis 2006, 2008    Left lower extremity multiple    Depression     Esophageal carcinoma 12/31/2014    had chemo and radiation prior surgery    Hemorrhoids     HH (hiatus hernia)     History of atrial fibrillation 2015    ONE EPISODE WHILE HOSPITALIZED    History of kidney stones     History of nephrolithiasis     History of pancreatitis     PT STATES MANY YEARS AGO    History of radiation therapy     History of transfusion     Hypertension     Long-term (current) use of anticoagulants, INR goal 2.0-3.0     Lymphedema     Malignant neoplasm of prostate     Other hyperlipidemia 01/30/2018 January 30, 2018 lipid panel risk 12.8%    Restless legs syndrome     Sleep apnea     OCCASIONALLY WEARS CPAP    Squamous carcinoma     on the head     Past Surgical History:   Procedure Laterality Date    APPENDECTOMY  1950    BRONCHOSCOPY      (Diagnostic)    CARDIAC CATHETERIZATION N/A 5/14/2022    Procedure: Left Heart Cath;  Surgeon: Eric So MD;  Location:  [facility];  Service: Cardiology;  Laterality: N/A;    CARDIAC CATHETERIZATION N/A 5/14/2022    Procedure: Coronary angiography;  Surgeon: Eric So MD;  Location:  [facility];  Service: Cardiology;  Laterality: N/A;    CARDIAC CATHETERIZATION N/A 5/14/2022    Procedure: Left ventriculography;  Surgeon: Eric So MD;  Location:  [facility];  Service: Cardiology;  Laterality: N/A;    CATARACT EXTRACTION Bilateral 2014    COLONOSCOPY  12/15/2014    Complete / Description: EH, IH, torts, stool, follow-up colonoscopy due in 5 years.    COLONOSCOPY N/A 6/13/2017    non-thrombosed external hemorrhoids, normal examined ileum, IH    COLONOSCOPY N/A 2/26/2019    Procedure: COLONOSCOPY with Cold Polypectomy;  Surgeon: Mulugeta Millan MD;  Location: [facility] ENDOSCOPY;  Service: Gastroenterology    COLONOSCOPY N/A 12/16/2020    Procedure: COLONOSCOPY to  cecum into TI;  Surgeon: Mesha Sanchez MD;  Location: [facility] ENDOSCOPY;  Service: Gastroenterology;  Laterality: N/A;  pre: lower GI bleed  post: hemorrhoids     CYSTOSCOPY W/ LASER LITHOTRIPSY      ENDOSCOPY N/A 6/13/2017    Procedure: ESOPHAGOGASTRODUODENOSCOPY WITH COLD BIOPSY;  Surgeon: Lake Gonzalez MD;  Location: [facility] ENDOSCOPY;  Service:     ENDOSCOPY N/A 11/18/2021    Procedure: ESOPHAGOGASTRODUODENOSCOPY WITH  DILATATION WITH FLUOROSCOPY;  Surgeon: Jose Christine III, MD;  Location: [facility] ENDOSCOPY;  Service: Gastroenterology;  Laterality: N/A;  Pre: dysphagia, h/x of adinocarcinoma of esophagus  Post: same    ESOPHAGECTOMY      April 2015, stage IIB esophageal carcinoma, sub-total resection.    ESOPHAGECTOMY      Esophagectomy Subtotal Andrea Joe Procedure    EXCISION LESION  08/2012    Removal of Squamous Cell CA on Head    HAMMER TOE REPAIR  09/2014    Hammertoe Operation (Each Toe), 10/2014    HAMMER TOE REPAIR Left 10/3/2017    Procedure: Left second third and fourth distal interphalangeal joint resection with flexor tenotomy;  Surgeon: Mulugeta Lira MD;  Location: [facility] OR OSC;  Service:     HEMORRHOIDECTOMY N/A 12/23/2020    Procedure: HEMORRHOIDECTOMY;  Surgeon: Billy Julio Jr., MD;  Location: [facility] MAIN OR;  Service: General;  Laterality: N/A;    HERNIA REPAIR      incisional    JEJUNOSTOMY      Laparoscopic    JEJUNOSTOMY      tube removal     KNEE SURGERY Bilateral 1967, 1973, 1981    PATELLA SURGERY Left     removed    PILONIDAL CYST / SINUS EXCISION      AL ARTHRP KNE CONDYLE&PLATU MEDIAL&LAT COMPARTMENTS Right 3/26/2018    Procedure: TOTAL KNEE ARTHROPLASTY;  Surgeon: Renny Solis MD;  Location: [facility] MAIN OR;  Service: Orthopedics    PROSTATECTOMY  2010    PYLOROPLASTY      SIGMOIDOSCOPY N/A 8/2/2023    Procedure: SIGMOIDOSCOPY FLEXIBLE;  Surgeon: Mesha Sanchez MD;  Location: [facility] ENDOSCOPY;  Service: Gastroenterology;  Laterality: N/A;  PRE- ABNORMAL  CT  POST- HEMORRHOIDS, ULCERATION    SPINAL FUSION  02/1998    C 5,6    TONSILLECTOMY      UPPER GASTROINTESTINAL ENDOSCOPY  12/15/2014    LA Grade D esophagitis, Pardo's, HH, multiple duodenal ulcers    VENTRAL/INCISIONAL HERNIA REPAIR N/A 4/14/2016    Procedure: VENTRAL/INCISIONAL HERNIA REPAIR, open, with mesh, and component separation;  Surgeon: Darren Rivas MD;  Location: [facility] MAIN OR;  Service:       General Information       Row Name 08/06/23 1427          Physical Therapy Time and Intention    Document Type evaluation  -CN     Mode of Treatment individual therapy;physical therapy  -CN       Row Name 08/06/23 1427          General Information    Patient Profile Reviewed yes  -CN     Prior Level of Function independent:;all household mobility  -CN     Existing Precautions/Restrictions fall  -CN       Row Name 08/06/23 1427          Living Environment    People in Home spouse  -CN       Row Name 08/06/23 1427          Home Main Entrance    Number of Stairs, Main Entrance none  -CN       Row Name 08/06/23 1427          Stairs Within Home, Primary    Number of Stairs, Within Home, Primary none  -CN       Row Name 08/06/23 1427          Cognition    Orientation Status (Cognition) oriented x 4  -CN       Row Name 08/06/23 1427          Safety Issues, Functional Mobility    Impairments Affecting Function (Mobility) balance;endurance/activity tolerance;strength;postural/trunk control  -CN               User Key  (r) = Recorded By, (t) = Taken By, (c) = Cosigned By      Initials Name Provider Type    CN Naheed Hendrickson, PT Physical Therapist                   Mobility       Row Name 08/06/23 1428          Bed Mobility    Bed Mobility supine-sit-supine  -CN     Supine-Sit-Supine [place] (Bed Mobility) standby assist  -CN     Assistive Device (Bed Mobility) bed rails;head of bed elevated  -CN       Row Name 08/06/23 1428          Sit-Stand Transfer    Sit-Stand [place] (Transfers)  standby assist  -CN     Assistive Device (Sit-Stand Transfers) walker, front-wheeled  -CN       Row Name 08/06/23 1428          Gait/Stairs (Locomotion)    [place] Level (Gait) contact guard  -CN     Assistive Device (Gait) walker, front-wheeled  -CN     Distance in Feet (Gait) 150'  -CN     Deviations/Abnormal Patterns (Gait) jf decreased;festinating/shuffling;stride length decreased  -CN     Bilateral Gait Deviations forward flexed posture;heel strike decreased  -CN     Comment, (Gait/Stairs) cues for erect posture and for safety with Rwx throughout  -CN               User Key  (r) = Recorded By, (t) = Taken By, (c) = Cosigned By      Initials Name Provider Type    Naheed Prajapati PT Physical Therapist                   Obj/Interventions       Row Name 08/06/23 1429          Range of Motion Comprehensive    General Range of Motion no range of motion deficits identified  -CN       Row Name 08/06/23 1429          Strength Comprehensive (MMT)    General Manual Muscle Testing (MMT) Assessment lower extremity strength deficits identified  -CN     Comment, General Manual Muscle Testing (MMT) Assessment LE strength grossly rated 4-/5  -CN       Row Name 08/06/23 1429          Balance    Balance Assessment sitting static balance;standing static balance  -CN     Static Sitting Balance standby assist  -CN     Static Standing Balance contact guard  -CN               User Key  (r) = Recorded By, (t) = Taken By, (c) = Cosigned By      Initials Name Provider Type    Naheed Prajapati PT Physical Therapist                   Goals/Plan       Row Name 08/06/23 1436          Bed Mobility Goal 1 (PT)    Activity/Assistive Device (Bed Mobility Goal 1, PT) bed mobility activities, all  -CN     [place] Level/Cues Needed (Bed Mobility Goal 1, PT) modified independence  -CN     Time Frame (Bed Mobility Goal 1, PT) 2 weeks  -CN       Row Name 08/06/23 1433          Transfer Goal 1 (PT)     Activity/Assistive Device (Transfer Goal 1, PT) transfers, all  -CN     [place] Level/Cues Needed (Transfer Goal 1, PT) modified independence  -CN     Time Frame (Transfer Goal 1, PT) 2 weeks  -CN       Row Name 08/06/23 1433          Gait Training Goal 1 (PT)    Activity/Assistive Device (Gait Training Goal 1, PT) gait (walking locomotion);assistive device use  -CN     [place] Level (Gait Training Goal 1, PT) modified independence  -CN     Time Frame (Gait Training Goal 1, PT) 2 weeks  -CN       Row Name 08/06/23 1433          Therapy Assessment/Plan (PT)    Planned Therapy Interventions (PT) balance training;bed mobility training;gait training;home exercise program;patient/family education;postural re-education;strengthening;transfer training  -CN               User Key  (r) = Recorded By, (t) = Taken By, (c) = Cosigned By      Initials Name Provider Type    CN Naheed Hendrickson, PT Physical Therapist                   Clinical Impression       Row Name 08/06/23 1430          Pain    Pretreatment Pain Rating 0/10 - no pain  -CN     Posttreatment Pain Rating 0/10 - no pain  -CN       Row Name 08/06/23 1430          Plan of Care Review    Plan of Care Reviewed With patient  -CN     Outcome Evaluation Pt readmitted to [facility] following recent hospital stay. Pt able to perform bed mobility and transfers with SBA and ambulated 150' with Rwx and CGA. Pt requires cues for erect posture and safety with Rwx per trunk forward flexion. Pt with dec jf and poor L foot clearance at times. Pt would benefit from skilled PT to address the deficits and plans to D/C home with HHPT as needed.  -CN       Row Name 08/06/23 1430          Therapy Assessment/Plan (PT)    Rehab Potential (PT) good, to achieve stated therapy goals  -CN     Criteria for Skilled Interventions Met (PT) yes;meets criteria;skilled treatment is necessary  -CN     Therapy Frequency (PT) 6 times/wk  -CN       Row Name 08/06/23 1438           Positioning and Restraints    Pre-Treatment Position in bed  -CN     Post Treatment Position bed  -CN     In Bed fowlers;call light within reach;encouraged to call for assist;exit alarm on  -CN               User Key  (r) = Recorded By, (t) = Taken By, (c) = Cosigned By      Initials Name Provider Type    Naheed Prajapati, PT Physical Therapist                   Outcome Measures       Row Name 08/06/23 1433 08/06/23 0828       How much help from another person do you currently need...    Turning from your back to your side while in flat bed without using bedrails? 4  -CN 4  -LT    Moving from lying on back to sitting on the side of a flat bed without bedrails? 4  -CN 4  -LT    Moving to and from a bed to a chair (including a wheelchair)? 3  -CN 3  -LT    Standing up from a chair using your arms (e.g., wheelchair, bedside chair)? 3  -CN 3  -LT    Climbing 3-5 steps with a railing? 2  -CN 2  -LT    To walk in hospital room? 3  -CN 2  -LT    AM-PAC 6 Clicks Score (PT) 19  -CN 18  -LT    Highest level of mobility 6 --> Walked 10 steps or more  -CN 6 --> Walked 10 steps or more  -LT      Row Name 08/06/23 1433          Functional Assessment    Outcome Measure Options AM-PAC 6 Clicks Basic Mobility (PT)  -CN               User Key  (r) = Recorded By, (t) = Taken By, (c) = Cosigned By      Initials Name Provider Type    LT Gabbie Toribio RN Registered Nurse    Naheed Prajapati, PT Physical Therapist                                 Physical Therapy Education       Title: PT OT SLP Therapies (Done)       Topic: Physical Therapy (Done)       Point: Mobility training (Done)       Learning Progress Summary             Patient Acceptance, E, VU,NR by MIKE at 8/6/2023 1433                         Point: Home exercise program (Done)       Learning Progress Summary             Patient Acceptance, E, VU,NR by MIKE at 8/6/2023 1433                         Point: Body mechanics (Done)       Learning Progress Summary              Patient Acceptance, E, VU,NR by CN at 8/6/2023 1433                         Point: Precautions (Done)       Learning Progress Summary             Patient Acceptance, E, VU,NR by CN at 8/6/2023 1433                                         User Key       Initials Effective Dates Name Provider Type Discipline     06/16/21 -  Naheed Hendrickson PT Physical Therapist PT                  PT Recommendation and Plan  Planned Therapy Interventions (PT): balance training, bed mobility training, gait training, home exercise program, patient/family education, postural re-education, strengthening, transfer training  Plan of Care Reviewed With: patient  Outcome Evaluation: Pt readmitted to [facility] following recent hospital stay. Pt able to perform bed mobility and transfers with SBA and ambulated 150' with Rwx and CGA. Pt requires cues for erect posture and safety with Rwx per trunk forward flexion. Pt with dec jf and poor L foot clearance at times. Pt would benefit from skilled PT to address the deficits and plans to D/C home with HHPT as needed.     Time Calculation:         PT Charges       Row Name 08/06/23 1434             Time Calculation    Start Time 1105  -CN      Stop Time 1122  -CN      Time Calculation (min) 17 min  -CN      PT Received On 08/06/23  -CN      PT - Next Appointment 08/07/23  -CN         Timed Charges    74801 - Gait Training Minutes  10  -CN         Total Minutes    Timed Charges Total Minutes 10  -CN       Total Minutes 10  -CN                User Key  (r) = Recorded By, (t) = Taken By, (c) = Cosigned By      Initials Name Provider Type    Naheed Prajapati, MANDA Physical Therapist                  Therapy Charges for Today       Code Description Service Date Service Provider Modifiers Qty    53931713465 HC GAIT TRAINING EA 15 MIN 8/6/2023 Naheed Hendrickson, PT GP 1    87176930904 HC PT EVAL LOW COMPLEXITY 1 8/6/2023 Naheed Hendrickson, PT GP 1            PT  G-Codes  Outcome Measure Options: AM-PAC 6 Clicks Basic Mobility (PT)  AM-PAC 6 Clicks Score (PT): 19  PT Discharge Summary  Anticipated Discharge Disposition (PT): home with assist, home with home health    Naheed Hendrickson, PT  8/6/2023

## 2023-08-06 NOTE — HOME HEALTH
SN Non- Admit:   Mr. Hurtado recently D/C from 6 day stay at [facility], d/c with multiple dx, Pt is severally malnourished with approx 60 lb weight loss in 6 months, Urology and GI was consulted in hospital with GI performed flexible sigmoidoscopy on 08/02, which revealed 2 small ulcers in the rectum and in the rectosigmoid colon. Pt failed spontaneous void test prior to DC and had indwelling cath placed with leg bag and standard bag at night. Pt was D/C with antibiotics and to cont chronic anticoag treatment.     On my assessment Pt AOX4, with clean lung bilat, pt c/o unable to take medications with continues nausea x 24 hours without taking home medications. All new medications that we RX from hospital are in home, While in home I had patient PO fluid challenged,  within approx 15 minutes patient failed and was cont dry heaving, with increase abd pain. I discussed this with Clinical Manager, family and patient, all agree for patient to be seen in the ER.     Home Health ordered for: disciplines: SN, PT, OT     Reason for Hosp/Primary Dx/Co-morbidities: Colitis, Proctitis, weight loss, bladder distension.     HB status/Living Arrangements: Lives at home with wife, one level home with no stairs.     Code Status: DNR    Medication issues/Concerns: Not taking medications x24 hours

## 2023-08-07 ENCOUNTER — ANESTHESIA (OUTPATIENT)
Dept: GASTROENTEROLOGY | Facility: HOSPITAL | Age: 73
DRG: 368 | End: 2023-08-07
Payer: MEDICARE

## 2023-08-07 ENCOUNTER — ANESTHESIA EVENT (OUTPATIENT)
Dept: GASTROENTEROLOGY | Facility: HOSPITAL | Age: 73
DRG: 368 | End: 2023-08-07
Payer: MEDICARE

## 2023-08-07 LAB
ANION GAP SERPL CALCULATED.3IONS-SCNC: 6.8 MMOL/L (ref 5–15)
BASOPHILS # BLD AUTO: 0.02 10*3/MM3 (ref 0–0.2)
BASOPHILS NFR BLD AUTO: 0.6 % (ref 0–1.5)
BUN SERPL-MCNC: 14 MG/DL (ref 8–23)
BUN/CREAT SERPL: 12.6 (ref 7–25)
CALCIUM SPEC-SCNC: 8.1 MG/DL (ref 8.6–10.5)
CHLORIDE SERPL-SCNC: 103 MMOL/L (ref 98–107)
CO2 SERPL-SCNC: 29.2 MMOL/L (ref 22–29)
CREAT SERPL-MCNC: 1.11 MG/DL (ref 0.76–1.27)
DEPRECATED RDW RBC AUTO: 45.3 FL (ref 37–54)
EGFRCR SERPLBLD CKD-EPI 2021: 69.2 ML/MIN/1.73
EOSINOPHIL # BLD AUTO: 0.14 10*3/MM3 (ref 0–0.4)
EOSINOPHIL NFR BLD AUTO: 3.9 % (ref 0.3–6.2)
ERYTHROCYTE [DISTWIDTH] IN BLOOD BY AUTOMATED COUNT: 13.8 % (ref 12.3–15.4)
GLUCOSE SERPL-MCNC: 94 MG/DL (ref 65–99)
HCT VFR BLD AUTO: 30.7 % (ref 37.5–51)
HGB BLD-MCNC: 10.2 G/DL (ref 13–17.7)
IMM GRANULOCYTES # BLD AUTO: 0.01 10*3/MM3 (ref 0–0.05)
IMM GRANULOCYTES NFR BLD AUTO: 0.3 % (ref 0–0.5)
LYMPHOCYTES # BLD AUTO: 1.34 10*3/MM3 (ref 0.7–3.1)
LYMPHOCYTES NFR BLD AUTO: 37.1 % (ref 19.6–45.3)
MAGNESIUM SERPL-MCNC: 1.9 MG/DL (ref 1.6–2.4)
MCH RBC QN AUTO: 29.7 PG (ref 26.6–33)
MCHC RBC AUTO-ENTMCNC: 33.2 G/DL (ref 31.5–35.7)
MCV RBC AUTO: 89.5 FL (ref 79–97)
MONOCYTES # BLD AUTO: 0.4 10*3/MM3 (ref 0.1–0.9)
MONOCYTES NFR BLD AUTO: 11.1 % (ref 5–12)
NEUTROPHILS NFR BLD AUTO: 1.7 10*3/MM3 (ref 1.7–7)
NEUTROPHILS NFR BLD AUTO: 47 % (ref 42.7–76)
NRBC BLD AUTO-RTO: 0 /100 WBC (ref 0–0.2)
PHOSPHATE SERPL-MCNC: 2.9 MG/DL (ref 2.5–4.5)
PLATELET # BLD AUTO: 148 10*3/MM3 (ref 140–450)
PMV BLD AUTO: 9.6 FL (ref 6–12)
POTASSIUM SERPL-SCNC: 3.9 MMOL/L (ref 3.5–5.2)
RBC # BLD AUTO: 3.43 10*6/MM3 (ref 4.14–5.8)
SODIUM SERPL-SCNC: 139 MMOL/L (ref 136–145)
WBC NRBC COR # BLD: 3.61 10*3/MM3 (ref 3.4–10.8)

## 2023-08-07 PROCEDURE — 84100 ASSAY OF PHOSPHORUS: CPT | Performed by: STUDENT IN AN ORGANIZED HEALTH CARE EDUCATION/TRAINING PROGRAM

## 2023-08-07 PROCEDURE — 94640 AIRWAY INHALATION TREATMENT: CPT

## 2023-08-07 PROCEDURE — 97110 THERAPEUTIC EXERCISES: CPT

## 2023-08-07 PROCEDURE — 25010000002 PROPOFOL 10 MG/ML EMULSION: Performed by: NURSE ANESTHETIST, CERTIFIED REGISTERED

## 2023-08-07 PROCEDURE — 80048 BASIC METABOLIC PNL TOTAL CA: CPT | Performed by: STUDENT IN AN ORGANIZED HEALTH CARE EDUCATION/TRAINING PROGRAM

## 2023-08-07 PROCEDURE — 25010000002 PHENYLEPHRINE 10 MG/ML SOLUTION: Performed by: ANESTHESIOLOGY

## 2023-08-07 PROCEDURE — 83735 ASSAY OF MAGNESIUM: CPT | Performed by: STUDENT IN AN ORGANIZED HEALTH CARE EDUCATION/TRAINING PROGRAM

## 2023-08-07 PROCEDURE — 85025 COMPLETE CBC W/AUTO DIFF WBC: CPT | Performed by: STUDENT IN AN ORGANIZED HEALTH CARE EDUCATION/TRAINING PROGRAM

## 2023-08-07 PROCEDURE — 0DJ08ZZ INSPECTION OF UPPER INTESTINAL TRACT, VIA NATURAL OR ARTIFICIAL OPENING ENDOSCOPIC: ICD-10-PCS | Performed by: INTERNAL MEDICINE

## 2023-08-07 PROCEDURE — 94664 DEMO&/EVAL PT USE INHALER: CPT

## 2023-08-07 PROCEDURE — G0378 HOSPITAL OBSERVATION PER HR: HCPCS

## 2023-08-07 PROCEDURE — 43235 EGD DIAGNOSTIC BRUSH WASH: CPT | Performed by: INTERNAL MEDICINE

## 2023-08-07 RX ORDER — LIDOCAINE HYDROCHLORIDE 20 MG/ML
INJECTION, SOLUTION INFILTRATION; PERINEURAL AS NEEDED
Status: DISCONTINUED | OUTPATIENT
Start: 2023-08-07 | End: 2023-08-07 | Stop reason: SURG

## 2023-08-07 RX ORDER — CASTOR OIL AND BALSAM, PERU 788; 87 MG/G; MG/G
1 OINTMENT TOPICAL EVERY 12 HOURS SCHEDULED
Status: DISCONTINUED | OUTPATIENT
Start: 2023-08-07 | End: 2023-08-10 | Stop reason: HOSPADM

## 2023-08-07 RX ORDER — AMOXICILLIN AND CLAVULANATE POTASSIUM 562.5; 437.5; 62.5 MG/1; MG/1; MG/1
2 TABLET, MULTILAYER, EXTENDED RELEASE ORAL EVERY 12 HOURS SCHEDULED
Status: DISCONTINUED | OUTPATIENT
Start: 2023-08-07 | End: 2023-08-07

## 2023-08-07 RX ORDER — SODIUM CHLORIDE 0.9 % (FLUSH) 0.9 %
10 SYRINGE (ML) INJECTION AS NEEDED
Status: DISCONTINUED | OUTPATIENT
Start: 2023-08-07 | End: 2023-08-07 | Stop reason: HOSPADM

## 2023-08-07 RX ORDER — PROPOFOL 10 MG/ML
VIAL (ML) INTRAVENOUS CONTINUOUS PRN
Status: DISCONTINUED | OUTPATIENT
Start: 2023-08-07 | End: 2023-08-07 | Stop reason: SURG

## 2023-08-07 RX ORDER — PHENYLEPHRINE HYDROCHLORIDE 10 MG/ML
INJECTION INTRAVENOUS AS NEEDED
Status: DISCONTINUED | OUTPATIENT
Start: 2023-08-07 | End: 2023-08-07 | Stop reason: SURG

## 2023-08-07 RX ORDER — GLYCOPYRROLATE 0.2 MG/ML
INJECTION INTRAMUSCULAR; INTRAVENOUS AS NEEDED
Status: DISCONTINUED | OUTPATIENT
Start: 2023-08-07 | End: 2023-08-07 | Stop reason: SURG

## 2023-08-07 RX ORDER — SODIUM CHLORIDE, SODIUM LACTATE, POTASSIUM CHLORIDE, CALCIUM CHLORIDE 600; 310; 30; 20 MG/100ML; MG/100ML; MG/100ML; MG/100ML
30 INJECTION, SOLUTION INTRAVENOUS CONTINUOUS
Status: DISCONTINUED | OUTPATIENT
Start: 2023-08-07 | End: 2023-08-10 | Stop reason: HOSPADM

## 2023-08-07 RX ORDER — PROPOFOL 10 MG/ML
VIAL (ML) INTRAVENOUS AS NEEDED
Status: DISCONTINUED | OUTPATIENT
Start: 2023-08-07 | End: 2023-08-07 | Stop reason: SURG

## 2023-08-07 RX ORDER — SODIUM CHLORIDE 9 MG/ML
1000 INJECTION, SOLUTION INTRAVENOUS CONTINUOUS
Status: ACTIVE | OUTPATIENT
Start: 2023-08-07 | End: 2023-08-09

## 2023-08-07 RX ADMIN — PRAMIPEXOLE DIHYDROCHLORIDE 1.5 MG: 1.5 TABLET ORAL at 21:14

## 2023-08-07 RX ADMIN — LIDOCAINE HYDROCHLORIDE 60 MG: 20 INJECTION, SOLUTION INFILTRATION; PERINEURAL at 09:59

## 2023-08-07 RX ADMIN — PHENYLEPHRINE HYDROCHLORIDE 100 MCG: 10 INJECTION INTRAVENOUS at 10:35

## 2023-08-07 RX ADMIN — GLYCOPYRROLATE 0.2 MG: 0.2 INJECTION INTRAMUSCULAR; INTRAVENOUS at 09:53

## 2023-08-07 RX ADMIN — PROPOFOL 180 MCG/KG/MIN: 10 INJECTION, EMULSION INTRAVENOUS at 09:59

## 2023-08-07 RX ADMIN — CASTOR OIL AND BALSAM, PERU 1 APPLICATION: 788; 87 OINTMENT TOPICAL at 21:14

## 2023-08-07 RX ADMIN — PANTOPRAZOLE SODIUM 40 MG: 40 TABLET, DELAYED RELEASE ORAL at 06:35

## 2023-08-07 RX ADMIN — APIXABAN 5 MG: 5 TABLET, FILM COATED ORAL at 21:14

## 2023-08-07 RX ADMIN — FUROSEMIDE 40 MG: 40 TABLET ORAL at 17:12

## 2023-08-07 RX ADMIN — Medication 10 ML: at 10:58

## 2023-08-07 RX ADMIN — TIOTROPIUM BROMIDE INHALATION SPRAY 2 PUFF: 3.12 SPRAY, METERED RESPIRATORY (INHALATION) at 15:15

## 2023-08-07 RX ADMIN — Medication 100 MG: at 09:59

## 2023-08-07 RX ADMIN — ATORVASTATIN CALCIUM 40 MG: 20 TABLET, FILM COATED ORAL at 21:14

## 2023-08-07 RX ADMIN — Medication 10 ML: at 21:14

## 2023-08-07 RX ADMIN — SODIUM CHLORIDE 1000 ML: 9 INJECTION, SOLUTION INTRAVENOUS at 08:54

## 2023-08-07 RX ADMIN — POTASSIUM CHLORIDE 10 MEQ: 750 TABLET, EXTENDED RELEASE ORAL at 10:57

## 2023-08-07 RX ADMIN — PRAMIPEXOLE DIHYDROCHLORIDE 1.5 MG: 1.5 TABLET ORAL at 10:57

## 2023-08-07 RX ADMIN — CASTOR OIL AND BALSAM, PERU 1 APPLICATION: 788; 87 OINTMENT TOPICAL at 11:53

## 2023-08-07 RX ADMIN — SODIUM PHOSPHATE, MONOBASIC, MONOHYDRATE AND SODIUM PHOSPHATE, DIBASIC, ANHYDROUS 15 MMOL: 276; 142 INJECTION, SOLUTION INTRAVENOUS at 00:04

## 2023-08-07 RX ADMIN — BISACODYL 5 MG: 5 TABLET ORAL at 10:57

## 2023-08-07 RX ADMIN — APIXABAN 5 MG: 5 TABLET, FILM COATED ORAL at 10:57

## 2023-08-07 RX ADMIN — PAROXETINE HYDROCHLORIDE HEMIHYDRATE 40 MG: 20 TABLET, FILM COATED ORAL at 06:35

## 2023-08-07 RX ADMIN — TAMSULOSIN HYDROCHLORIDE 0.4 MG: 0.4 CAPSULE ORAL at 10:57

## 2023-08-07 NOTE — DISCHARGE PLACEMENT REQUEST
"Jose Bourgeois (73 y.o. Male)       Date of Birth   1950    Social Security Number       Address   [address] [address]    Home Phone   [phone]    MRN   [id number]       [facility]    Marital Status                               Admission Date   8/5/23    Admission Type   Emergency    Admitting Provider   Nitesh Oglesby DO    Attending Provider   Carloz Ortiz MD    Department, Room/Bed   [address], [address]       Discharge Date       Discharge Disposition       Discharge Destination                                 Attending Provider: Carloz Ortiz MD    Allergies: No Known Allergies    Isolation: None   Infection: None   Code Status: CPR    Ht: 195.6 cm (77\")   Wt: 77.1 kg (170 lb)    Admission Cmt: None   Principal Problem: Nausea [R11.0]                   Active Insurance as of 8/5/2023       Primary Coverage       Payor Plan Insurance Group Employer/Plan Group    HUMANA MEDICARE REPLACEMENT HU[employer]NA MEDICARE REPLACEMENT [id number]       Payor Plan Address Payor Plan Phone Number Payor Plan Fax Number Effective Dates    [address] [address] [phone]  1/1/2018 - None Entered    [facility] [id number]         Subscriber Name Subscriber Birth Date Member ID       JOSE BOURGEOIS 1950 [id number]                     Emergency Contacts        (Rel.) Home Phone Work Phone Mobile Phone    Lena Alvarado (Spouse) [phone] -- [phone]    woodrow danielle (Friend) -- -- [phone]                "

## 2023-08-07 NOTE — PROGRESS NOTES
Name: Jose Hurtado ADMIT: 2023   : 1950  PCP: Brandin Bolton MD    MRN: [id number] LOS: 0 days   AGE/SEX: 73 y.o. male  ROOM: [facility]     Subjective   Subjective   He is feeling good today, no pain or problems with eating.    Objective   Objective   Vital Signs  Temp:  [97.3 øF (36.3 øC)-97.9 øF (36.6 øC)] 97.3 øF (36.3 øC)  Heart Rate:  [50-64] 64  Resp:  [16-18] 18  BP: ()/(44-61) 113/61  SpO2:  [96 %-100 %] 100 %  on   ;   Device (Oxygen Therapy): room air  Body mass index is 20.16 kg/mý.  Physical Exam  Constitutional:       General: He is not in acute distress.     Appearance: He is ill-appearing.   Eyes:      Extraocular Movements: Extraocular movements intact.      Pupils: Pupils are equal, round, and reactive to light.   Cardiovascular:      Rate and Rhythm: Normal rate.   Pulmonary:      Effort: Pulmonary effort is normal.      Breath sounds: No stridor.   Abdominal:      General: There is no distension.      Tenderness: There is no abdominal tenderness. There is no guarding or rebound.      Hernia: No hernia is present.   Skin:     Coloration: Skin is not jaundiced.   Neurological:      General: No focal deficit present.      Mental Status: He is alert and oriented to person, place, and time.   Psychiatric:         Mood and Affect: Mood normal.         Behavior: Behavior normal.       Results Review     I reviewed the patient's new clinical results.  Results from last 7 days   Lab Units 23  0617 23  0734 23  1427 23  0716   WBC 10*3/mm3 3.61 3.23* 3.69 4.59   HEMOGLOBIN g/dL 10.2* 10.2* 11.1* 10.6*   PLATELETS 10*3/mm3 148 149 146 154       Results from last 7 days   Lab Units 23  0617 23  0734 23  1427   SODIUM mmol/L 139 137 135*   POTASSIUM mmol/L 3.9 4.4 4.7   CHLORIDE mmol/L 103 105 105   CO2 mmol/L 29.2* 27.0 24.0   BUN mg/dL 14 18 20   CREATININE mg/dL 1.11 0.96 1.11   GLUCOSE mg/dL 94 91 109*   EGFR mL/min/1.73 69.2 82.4 69.2        Results from last 7 days   Lab Units 08/05/23  1427   ALBUMIN g/dL 3.3*   BILIRUBIN mg/dL 0.2   ALK PHOS U/L 80   AST (SGOT) U/L 14   ALT (SGPT) U/L 10       Results from last 7 days   Lab Units 08/07/23  0617 08/06/23 2021 08/06/23  0734 08/05/23  1427   CALCIUM mg/dL 8.1*  --  8.6 8.7   ALBUMIN g/dL  --   --   --  3.3*   MAGNESIUM mg/dL 1.9  --  2.2 2.1   PHOSPHORUS mg/dL 2.9 2.2* 2.1* 2.3*         No results found for: HGBA1C, POCGLU    XR Chest 1 View    Result Date: 8/5/2023  Stable exam  This report was finalized on 8/5/2023 2:40 PM by Dr. Miguel Hogan M.D.     Scheduled Medications  apixaban, 5 mg, Oral, Q12H  atorvastatin, 40 mg, Oral, Nightly  bisacodyl, 5 mg, Oral, Daily  furosemide, 40 mg, Oral, BID  metoprolol tartrate, 50 mg, Oral, BID  pantoprazole, 40 mg, Oral, Q AM  PARoxetine, 40 mg, Oral, QAM  polyethylene glycol, 17 g, Oral, Daily  potassium chloride, 10 mEq, Oral, Daily  pramipexole, 1.5 mg, Oral, BID  senna-docusate sodium, 2 tablet, Oral, BID  sodium chloride, 10 mL, Intravenous, Q12H  tamsulosin, 0.4 mg, Oral, Daily  tiotropium bromide monohydrate, 2 puff, Inhalation, Daily    Infusions   Diet  NPO Diet NPO Type: Sips with Meds       Assessment/Plan     Active Hospital Problems    Diagnosis  POA    **Nausea [R11.0]  Yes    Weight loss [R63.4]  Unknown    Other symptoms and signs concerning food and fluid intake [R63.8]  Yes    Dysphagia [R13.10]  Yes    Severe malnutrition [E43]  Yes    Acute urinary retention [R33.8]  Yes    Nausea and vomiting [R11.2]  Unknown    Chronic obstructive pulmonary disease [J44.9]  Yes    Dysphagia [R13.10]  Unknown    CKD (chronic kidney disease) stage 2, GFR 60-89 ml/min [N18.2]  Yes    Chronic anticoagulation [Z79.01]  Not Applicable    History of recurrent deep vein thrombosis (DVT) [Z86.718]  Not Applicable    Hypertension [I10]  Yes    History of esophageal cancer [Z85.01]  Yes      Resolved Hospital Problems   No resolved problems to display.        73 y.o. male admitted with Nausea.      08/07/23  Repeat EGD tomorrow    N/V  Severe malnutrition  Dysphagia  Esophageal CA (s/p esophagectomy)  -SLP, Nutrition consults  -GI following  -EGD aborted 8/7/2023 due to inadequate prep, plan to repeat tomorrow morning    Intraabdominal infection  -From recent hospital admission, seen on CT w/ colitis/proctitis  -Flex sig on 8/2 with 2 small ulcers in rectum and rectosigmoid colon, nonbleeding internal hemorrhoids  -Continue cefdinir and Flagyl to complete 7 days    Acute urinary retention  -Failed voiding trial prior to discharge, King catheter in place  -OP follow-up with urology    COPD  -Symbicort, Spiriva  -DuoNebs   -prednisone 40mg daily x5d     HLD  -atorvastatin 40mg qhs     Afib w/ RVR  -RC: metoprolol 50mg BID  -AC: apixaban     Hx DVT  APL syndrome  -apixaban as above     Depression  -Paxil 40mg qam    Chronic myocardial injury  -trop 59 > 61; no further ischemic eval indicated at this time      Eliquis (home med) for DVT prophylaxis.  Discussed with patient.  Anticipate discharge home when cleared by consultants      Carloz Ortiz MD  [facility]  08/07/23  08:23 EDT

## 2023-08-07 NOTE — PROGRESS NOTES
Discharge Planning Assessment  [facility]     Patient Name: Jose Hurtado  MRN: [id number]  Today's Date: 8/7/2023    Admit Date: 8/5/2023    Plan: Home with spouse and HH   Discharge Needs Assessment       Row Name 08/07/23 1630       Living Environment    People in Home spouse    Name(s) of People in Home Lena    Current Living Arrangements home    Primary Care Provided by self;spouse/significant other    Provides Primary Care For no one, unable/limited ability to care for self    Family Caregiver if Needed spouse    Quality of Family Relationships helpful;involved;supportive    Able to Return to Prior Arrangements yes       Resource/Environmental Concerns    Resource/Environmental Concerns none       Transition Planning    Patient/Family Anticipates Transition to home with family    Patient/Family Anticipated Services at Transition home health care    Transportation Anticipated family or friend will provide       Discharge Needs Assessment    Readmission Within the Last 30 Days other (see comments)  see EMR    Equipment Currently Used at Home cane, straight;walker, rolling;shower chair    Concerns to be Addressed denies needs/concerns at this time    Equipment Needed After Discharge none    Discharge Facility/Level of Care Needs home with home health    Provided Post Acute Provider List? N/A    Provided Post Acute Provider Quality & Resource List? N/A                   Discharge Plan       Row Name 08/07/23 3950       Plan    Plan Home with spouse and HH    Patient/Family in Agreement with Plan yes    Plan Comments Met with patient at the bedside, verified facesheet. Patient lives in a patio home with spouse. Patient  has a cane,  walker, grab bars, and CPAP.  Patient is IADL. Patient states he owns a vehicle/drives, he denies transportation needs. Patient has had [facility] in the past, he would like to use them again. He has been to [facility] in the past, he denies SNF needs at this time. PCP is Dr. Bolton  and preferred pharmacy is [facility]. Plan is to return home with spouse. Referral sent to [facility].                  Continued Care and Services - Admitted Since 8/5/2023       Home Medical Care       Service Provider Request Status Selected Services Address Phone Fax Patient Preferred     [facility] Pending - Request Sent N/A [address] [id number] [phone] --                  Expected Discharge Date and Time       Expected Discharge Date Expected Discharge Time    Aug 8, 2023            Demographic Summary    No documentation.                  Functional Status       Row Name 08/07/23 1632       Functional Status    Usual Activity Tolerance moderate                   Psychosocial    No documentation.                  Abuse/Neglect    No documentation.                  Legal    No documentation.                  Substance Abuse    No documentation.                  Patient Forms    No documentation.                     Dea Barger RN

## 2023-08-07 NOTE — PLAN OF CARE
Goal Outcome Evaluation:  Plan of Care Reviewed With: patient, spouse           Outcome Evaluation: Pt tolerated amb ~150ft w/rwx, CGA/SBA 1, pt SBA for all tfers, pt able to don his shoes prior to amb at EOB, no LOB ; pt laron LE exer x10 reps ; plans home w/wife and HH when medically ready, EGD planned for 8/8 am and will follow progress      Anticipated Discharge Disposition (PT): home with assist, home with home health

## 2023-08-07 NOTE — PLAN OF CARE
Goal Outcome Evaluation:              Outcome Evaluation: Orders received. Following EGD, clear liquid diet recommended. RN reports patient tolerating clear liquid diet well. Patient known to  outpatient services with April 2023 discharge indicating noncompliance with home exercise program. Swallow home exercise written material provided to patient during previous hospitalization last week. Previous VFSS 3/2023 recommended soft/whole diet with thin liquids. Speech to s/o at this time; RN in agreement. Please re-consult if concerned for aspiration.

## 2023-08-07 NOTE — NURSING NOTE
08/07/23 0805   Wound 08/05/23 1749 sacral spine   Placement Date/Time: 08/05/23 1749   Present on Hospital Admission: Yes  Location: sacral spine   Pressure Injury Stage 1   Base non-blanchable;red   Periwound intact;blanchable   Drainage Amount none   Care, Wound cleansed with;sterile normal saline   Dressing Care dressing applied;silicone;foam   Wound 08/05/23 1757 Left posterior heel   Placement Date/Time: 08/05/23 1757   Side: Left  Orientation: posterior  Location: heel   Pressure Injury Stage DTPI   Base maroon/purple   Periwound intact;blanchable;non-blanchable   Periwound Temperature warm   Wound Length (cm) 1.5 cm   Wound Width (cm) 4 cm   Wound Surface Area (cm^2) 6 cm^2   Drainage Amount none   Dressing Care open to air  (Venelex and heel boots ordered)   Skin Interventions   Pressure Reduction Devices pressure-redistributing mattress utilized   Pressure Reduction Techniques heels elevated off bed;positioned off wounds;pressure points protected   Skin Protection silicone foam dressing in place     CWON note: pt seen for evaluation of skin issues POA. Pt is alert and able to turn with some assistance, Troy Willams. Will have staff to add an accumax pump to pt's mattress from adequate pressure redistribution. Optifoam dressing placed over sacrum, heels are off-loaded with pillows. Staff can place heel boots when he arrives back on the unit after testing and venelex has been ordered as well. Right heel with blanchable erythema. Wound care and prevention standing orders have been placed into Epic, discussed with RN. Please re-consult for any additional needs.

## 2023-08-07 NOTE — ANESTHESIA POSTPROCEDURE EVALUATION
Patient: Jose Hurtado    Procedure Summary       Date: 08/07/23 Room / Location:  [facility] / [facility] ENDOSCOPY    Anesthesia Start: 0949 Anesthesia Stop: 1006    Procedure: ESOPHAGOGASTRODUODENOSCOPY (Esophagus) Diagnosis:       Dysphagia, unspecified type      Nausea and vomiting, unspecified vomiting type      Weight loss      (Dysphagia, unspecified type [R13.10])      (Nausea and vomiting, unspecified vomiting type [R11.2])      (Weight loss [R63.4])    Surgeons: Jameel Valencia MD Provider: Estrada Florian MD    Anesthesia Type: MAC ASA Status: 3            Anesthesia Type: MAC    Vitals  Vitals Value Taken Time   /63 08/07/23 1039   Temp     Pulse 73 08/07/23 1031   Resp 16 08/07/23 1031   SpO2 100 % 08/07/23 1031           Post Anesthesia Care and Evaluation    Patient location during evaluation: PHASE II  Patient participation: complete - patient participated  Level of consciousness: awake and alert  Pain management: adequate    Airway patency: patent  Anesthetic complications: No anesthetic complications  PONV Status: none  Cardiovascular status: acceptable, blood pressure returned to baseline and hemodynamically stable  Respiratory status: acceptable, nonlabored ventilation and spontaneous ventilation  Hydration status: acceptable

## 2023-08-07 NOTE — PLAN OF CARE
Goal Outcome Evaluation:              Outcome Evaluation: Orders received. Patient off the floor this AM for EGD. Speech to follow.

## 2023-08-07 NOTE — ANESTHESIA PREPROCEDURE EVALUATION
Anesthesia Evaluation                  Airway   Mallampati: II  Neck ROM: full  Dental    (+) upper dentures    Pulmonary    (+) pneumonia , COPD,shortness of breath, sleep apnea  (-) wheezes  Cardiovascular     ECG reviewed  Rhythm: regular    (+) hypertension, past MI  >12 monthsdysrhythmias Paroxysmal Atrial Fib, CHF , hyperlipidemia  (-) murmur    ROS comment: NSTEMI in past  Normal Cath  Echo EF 55% no AS,AR or MS,MR      Neuro/Psych  GI/Hepatic/Renal/Endo    (+) hiatal hernia, GI bleeding , renal disease CRI    ROS Comment: Stage 2  CRI    Musculoskeletal     Abdominal    Substance History      OB/GYN          Other                      Anesthesia Plan    ASA 3     MAC     (D/W pt. MAC and possible awareness intra op.  Pt understands MAC and GA are not the same and the possibility of GA being required for failed MAC)  intravenous induction     Anesthetic plan, risks, benefits, and alternatives have been provided, discussed and informed consent has been obtained with: patient.    CODE STATUS:    Code Status (Patient has no pulse and is not breathing): CPR (Attempt to Resuscitate)  Medical Interventions (Patient has pulse or is breathing): Full Support  Release to patient: Routine Release

## 2023-08-07 NOTE — THERAPY TREATMENT NOTE
Patient Name: Jose Hurtado  : 1950    MRN: [id number]                              Today's Date: 2023       Admit Date: 2023    Visit Dx:     ICD-10-CM ICD-9-CM   1. Nausea  R11.0 787.02   2. Dehydration  E86.0 276.51   3. Dysphagia, unspecified type  R13.10 787.20   4. Nausea and vomiting, unspecified vomiting type  R11.2 787.01   5. Weight loss  R63.4 783.21   6. Oral phase dysphagia  R13.11 787.21     Patient Active Problem List   Diagnosis    History of esophageal cancer    Adenomatous polyp of colon    Malignant neoplasm of prostate    RLS (restless legs syndrome)    History of DVT (deep vein thrombosis)    Recurrent major depressive disorder, in partial remission    Hypertension    Anemia    Insomnia due to other mental disorder    Peripheral polyneuropathy    B12 deficiency    Lymphedema    Iron deficiency    Gastroparesis    History of recurrent deep vein thrombosis (DVT)    Tremor of both hands    Gastrointestinal hemorrhage    Acute blood loss anemia    Pancytopenia    Chronic venous hypertension due to DVT    Bleeding hemorrhoid    Malabsorption of iron    Iron deficiency anemia    History of esophageal cancer    Abnormal CT scan    Generalized weakness    Chronic anticoagulation    CKD (chronic kidney disease) stage 2, GFR 60-89 ml/min    Dysphagia    NSTEMI (non-ST elevated myocardial infarction)    Bronchitis    Dyspnea    Elevated troponin    Atrial fibrillation    Chronic obstructive pulmonary disease    Congestive heart failure    Gait instability    Dark stools    Proctitis    Nausea and vomiting    Acute urinary retention    Severe malnutrition    Nausea    Other symptoms and signs concerning food and fluid intake    Dysphagia    Weight loss     Past Medical History:   Diagnosis Date    Acute deep vein thrombosis (DVT) of popliteal vein of left lower extremity 2020    Anemia     Anti-phospholipid syndrome     On lifelong anticoagulation therapy    Bladder disorder      LEAKAGE   ON MED  wers pads    Bleeding hemorrhoids     Chronic kidney disease     Community acquired pneumonia     HISTORY OF IN 2014    Congestive heart failure     COPD (chronic obstructive pulmonary disease)     Deep venous thrombosis 2006, 2008    Left lower extremity multiple    Depression     Esophageal carcinoma 12/31/2014    had chemo and radiation prior surgery    Hemorrhoids     HH (hiatus hernia)     History of atrial fibrillation 2015    ONE EPISODE WHILE HOSPITALIZED    History of kidney stones     History of nephrolithiasis     History of pancreatitis     PT STATES MANY YEARS AGO    History of radiation therapy     History of transfusion     Hypertension     Long-term (current) use of anticoagulants, INR goal 2.0-3.0     Lymphedema     Malignant neoplasm of prostate     Other hyperlipidemia 01/30/2018 January 30, 2018 lipid panel risk 12.8%    Restless legs syndrome     Sleep apnea     OCCASIONALLY WEARS CPAP    Squamous carcinoma     on the head     Past Surgical History:   Procedure Laterality Date    APPENDECTOMY  1950    BRONCHOSCOPY      (Diagnostic)    CARDIAC CATHETERIZATION N/A 5/14/2022    Procedure: Left Heart Cath;  Surgeon: Eric So MD;  Location:  [facility];  Service: Cardiology;  Laterality: N/A;    CARDIAC CATHETERIZATION N/A 5/14/2022    Procedure: Coronary angiography;  Surgeon: Eric So MD;  Location:  [facility];  Service: Cardiology;  Laterality: N/A;    CARDIAC CATHETERIZATION N/A 5/14/2022    Procedure: Left ventriculography;  Surgeon: Eric So MD;  Location:  [facility];  Service: Cardiology;  Laterality: N/A;    CATARACT EXTRACTION Bilateral 2014    COLONOSCOPY  12/15/2014    Complete / Description: EH, IH, torts, stool, follow-up colonoscopy due in 5 years.    COLONOSCOPY N/A 6/13/2017    non-thrombosed external hemorrhoids, normal examined ileum, IH    COLONOSCOPY N/A 2/26/2019     Procedure: COLONOSCOPY with Cold Polypectomy;  Surgeon: Mulugeta Millan MD;  Location: [facility] ENDOSCOPY;  Service: Gastroenterology    COLONOSCOPY N/A 12/16/2020    Procedure: COLONOSCOPY to cecum into TI;  Surgeon: Mesha Sanchez MD;  Location: [facility] ENDOSCOPY;  Service: Gastroenterology;  Laterality: N/A;  pre: lower GI bleed  post: hemorrhoids     CYSTOSCOPY W/ LASER LITHOTRIPSY      ENDOSCOPY N/A 6/13/2017    Procedure: ESOPHAGOGASTRODUODENOSCOPY WITH COLD BIOPSY;  Surgeon: Lake Gonzalez MD;  Location: [facility] ENDOSCOPY;  Service:     ENDOSCOPY N/A 11/18/2021    Procedure: ESOPHAGOGASTRODUODENOSCOPY WITH  DILATATION WITH FLUOROSCOPY;  Surgeon: Jose Christine III, MD;  Location: [facility] ENDOSCOPY;  Service: Gastroenterology;  Laterality: N/A;  Pre: dysphagia, h/x of adinocarcinoma of esophagus  Post: same    ESOPHAGECTOMY      April 2015, stage IIB esophageal carcinoma, sub-total resection.    ESOPHAGECTOMY      Esophagectomy Subtotal [place] Procedure    EXCISION LESION  08/2012    Removal of Squamous Cell CA on Head    HAMMER TOE REPAIR  09/2014    Hammertoe Operation (Each Toe), 10/2014    HAMMER TOE REPAIR Left 10/3/2017    Procedure: Left second third and fourth distal interphalangeal joint resection with flexor tenotomy;  Surgeon: Mulugeta Liar MD;  Location: [facility] OR OSC;  Service:     HEMORRHOIDECTOMY N/A 12/23/2020    Procedure: HEMORRHOIDECTOMY;  Surgeon: Billy Julio Jr., MD;  Location: [facility] MAIN OR;  Service: General;  Laterality: N/A;    HERNIA REPAIR      incisional    JEJUNOSTOMY      Laparoscopic    JEJUNOSTOMY      tube removal     KNEE SURGERY Bilateral 1967, 1973, 1981    PATELLA SURGERY Left     removed    PILONIDAL CYST / SINUS EXCISION      MN ARTHRP KNE CONDYLE&PLATU MEDIAL&LAT COMPARTMENTS Right 3/26/2018    Procedure: TOTAL KNEE ARTHROPLASTY;  Surgeon: Renny Solis MD;  Location: [facility] MAIN OR;  Service: Orthopedics    PROSTATECTOMY  2010     PYLOROPLASTY      SIGMOIDOSCOPY N/A 8/2/2023    Procedure: SIGMOIDOSCOPY FLEXIBLE;  Surgeon: Mesha Sanchez MD;  Location: [facility] ENDOSCOPY;  Service: Gastroenterology;  Laterality: N/A;  PRE- ABNORMAL CT  POST- HEMORRHOIDS, ULCERATION    SPINAL FUSION  02/1998    C 5,6    TONSILLECTOMY      UPPER GASTROINTESTINAL ENDOSCOPY  12/15/2014    LA Grade D esophagitis, Pardo's, HH, multiple duodenal ulcers    VENTRAL/INCISIONAL HERNIA REPAIR N/A 4/14/2016    Procedure: VENTRAL/INCISIONAL HERNIA REPAIR, open, with mesh, and component separation;  Surgeon: Darren Rivas MD;  Location: [facility] MAIN OR;  Service:       General Information       Row Name 08/07/23 1718          Physical Therapy Time and Intention    Document Type therapy note (daily note)  -     Mode of Treatment individual therapy;physical therapy  -       Row Name 08/07/23 1718          General Information    Patient Profile Reviewed yes  -     Existing Precautions/Restrictions fall  -       Row Name 08/07/23 1718          Living Environment    People in Home spouse  -[facility] Name 08/07/23 1718          Cognition    Orientation Status (Cognition) oriented x 4  -       Row Name 08/07/23 1718          Safety Issues, Functional Mobility    Safety Issues Affecting Function (Mobility) impulsivity;insight into deficits/self-awareness;judgment;problem-solving;safety precaution awareness  -     Impairments Affecting Function (Mobility) balance;postural/trunk control;strength  -               User Key  (r) = Recorded By, (t) = Taken By, (c) = Cosigned By      Initials Name Provider Type     Astrid Barba PTA Physical Therapist Assistant                   Mobility       Row Name 08/07/23 1719          Bed Mobility    Supine-Sit-Supine Dane (Bed Mobility) standby assist  -     Assistive Device (Bed Mobility) bed rails;head of bed elevated  -     Comment, (Bed Mobility) educ on slowing pace for safety while untangling  "wires etc  -       Row Name 08/07/23 1719          Sit-Stand Transfer    Sit-Stand [place] (Transfers) contact guard;standby assist  -     Assistive Device (Sit-Stand Transfers) walker, front-wheeled  -     Comment, (Sit-Stand Transfer) elevated bed pt is 6ft 5in  -       Row Name 08/07/23 1719          Gait/Stairs (Locomotion)    [place] Level (Gait) contact guard;verbal cues  -     Assistive Device (Gait) walker, front-wheeled  -     Distance in Feet (Gait) 150ft, cues to avoid turning toes inward and tripping over shoes  -     Deviations/Abnormal Patterns (Gait) base of support, narrow;festinating/shuffling  \"internally rotates toes\"  -     Bilateral Gait Deviations forward flexed posture;heel strike decreased  -               User Key  (r) = Recorded By, (t) = Taken By, (c) = Cosigned By      Initials Name Provider Type    Astrid Collins PTA Physical Therapist Assistant                   Obj/Interventions       Row Name 08/07/23 1722          Motor Skills    Therapeutic Exercise --  LAQs, seated MIP x10 reps  -               User Key  (r) = Recorded By, (t) = Taken By, (c) = Cosigned By      Initials Name Provider Type    Astrid Collins PTA Physical Therapist Assistant                   Goals/Plan    No documentation.                  Clinical Impression       Row Name 08/07/23 1722          Pain    Pretreatment Pain Rating 0/10 - no pain  -     Posttreatment Pain Rating 0/10 - no pain  -[facility] Name 08/07/23 1722          Plan of Care Review    Plan of Care Reviewed With patient;spouse  -     Outcome Evaluation Pt tolerated amb ~150ft w/rwx, CGA/SBA 1, pt SBA for all tfers, pt able to don his shoes prior to amb at EOB, no LOB ; pt laron LE exer x10 reps ; plans home w/wife and HH when medically ready, EGD planned for 8/8 am and will follow progress  -       Row Name 08/07/23 1722          Therapy Assessment/Plan (PT)    Rehab Potential (PT) good, to achieve " stated therapy goals  -     Criteria for Skilled Interventions Met (PT) yes  -       Row Name 08/07/23 1722          Vital Signs    O2 Delivery Pre Treatment room air  -     Post SpO2 (%) 100  -       Row Name 08/07/23 1722          Positioning and Restraints    Pre-Treatment Position in bed  -     Post Treatment Position bed  -JM     In Bed fowlers;call light within reach;encouraged to call for assist;exit alarm on;with family/caregiver;with nsg;waffle boots/both  -               User Key  (r) = Recorded By, (t) = Taken By, (c) = Cosigned By      Initials Name Provider Type    Astrid Collins PTA Physical Therapist Assistant                   Outcome Measures       Row Name 08/07/23 1725          How much help from another person do you currently need...    Turning from your back to your side while in flat bed without using bedrails? 4  -JM     Moving from lying on back to sitting on the side of a flat bed without bedrails? 4  -JM     Moving to and from a bed to a chair (including a wheelchair)? 3  -JM     Standing up from a chair using your arms (e.g., wheelchair, bedside chair)? 3  -JM     Climbing 3-5 steps with a railing? 2  -JM     To walk in hospital room? 3  -JM     AM-PAC 6 Clicks Score (PT) 19  -JM     Highest level of mobility 6 --> Walked 10 steps or more  -               User Key  (r) = Recorded By, (t) = Taken By, (c) = Cosigned By      Initials Name Provider Type    Astrid Collins PTA Physical Therapist Assistant                                 Physical Therapy Education       Title: PT OT SLP Therapies (Done)       Topic: Physical Therapy (Done)       Point: Mobility training (Done)       Learning Progress Summary             Patient CONCEPCIÓN Chong TB, D, VU,JASSI by EUGENE at 8/7/2023 1725    Acceptance, ZAN BEEBE,NR by MIKE at 8/6/2023 1433   Family CONCEPCIÓN Chong TB, D, VU, DU by EUGENE at 8/7/2023 1725                         Point: Home exercise program (Done)       Learning Progress Summary              Patient Eager, E,TB,D, VU,DU by  at 8/7/2023 1725    Acceptance, E, VU,NR by CN at 8/6/2023 1433   Family Eager, E,TB,D, VU,DU by  at 8/7/2023 1725                         Point: Body mechanics (Done)       Learning Progress Summary             Patient Eager, E,TB,D, VU,DU by  at 8/7/2023 1725    Acceptance, E, VU,NR by CN at 8/6/2023 1433   Family Eager, E,TB,D, VU,DU by  at 8/7/2023 1725                         Point: Precautions (Done)       Learning Progress Summary             Patient Eager, E,TB,D, VU,DU by  at 8/7/2023 1725    Acceptance, E, VU,NR by CN at 8/6/2023 1433   Family Eager, E,TB,D, VU,DU by  at 8/7/2023 1725                                         User Key       Initials Effective Dates Name Provider Type Discipline     03/07/18 -  Astrid Barba PTA Physical Therapist Assistant PT    CN 06/16/21 -  Naheed Hendrickson, PT Physical Therapist PT                  PT Recommendation and Plan     Plan of Care Reviewed With: patient, spouse  Outcome Evaluation: Pt tolerated amb ~150ft w/rwx, CGA/SBA 1, pt SBA for all tfers, pt able to don his shoes prior to amb at EOB, no LOB ; pt laron LE exer x10 reps ; plans home w/wife and HH when medically ready, EGD planned for 8/8 am and will follow progress     Time Calculation:         PT Charges       Row Name 08/07/23 1725             Time Calculation    Start Time 1650  -      Stop Time 1714  -      Time Calculation (min) 24 min  -      PT Received On 08/07/23  -      PT - Next Appointment 08/08/23  -                User Key  (r) = Recorded By, (t) = Taken By, (c) = Cosigned By      Initials Name Provider Type    Astrid Collins PTA Physical Therapist Assistant                  Therapy Charges for Today       Code Description Service Date Service Provider Modifiers Qty    [id number] HC PT THER PROC EA 15 MIN 8/7/2023 Astrid Barba PTA GP 2            PT G-Codes  Outcome Measure Options: AM-PAC 6 Clicks Basic Mobility  (PT)  AM-PAC 6 Clicks Score (PT): 19  PT Discharge Summary  Anticipated Discharge Disposition (PT): home with assist, home with home health    Astrid Barba, PTA  8/7/2023

## 2023-08-07 NOTE — PLAN OF CARE
Goal Outcome Evaluation:  Plan of Care Reviewed With: patient        Progress: no change  Outcome Evaluation: King in place, catheter care provided. Replaced phos this shift, NPO after midnight for EGD today. Rested well. Pt refused to be turned, education reinforced. Will continue to monitor.

## 2023-08-08 ENCOUNTER — ANESTHESIA EVENT (OUTPATIENT)
Dept: GASTROENTEROLOGY | Facility: HOSPITAL | Age: 73
DRG: 368 | End: 2023-08-08
Payer: MEDICARE

## 2023-08-08 ENCOUNTER — ANESTHESIA (OUTPATIENT)
Dept: GASTROENTEROLOGY | Facility: HOSPITAL | Age: 73
DRG: 368 | End: 2023-08-08
Payer: MEDICARE

## 2023-08-08 LAB
ALBUMIN SERPL-MCNC: 2.9 G/DL (ref 3.5–5.2)
ALBUMIN/GLOB SERPL: 1.2 G/DL
ALP SERPL-CCNC: 78 U/L (ref 39–117)
ALT SERPL W P-5'-P-CCNC: 9 U/L (ref 1–41)
ANION GAP SERPL CALCULATED.3IONS-SCNC: 5 MMOL/L (ref 5–15)
AST SERPL-CCNC: 15 U/L (ref 1–40)
BASOPHILS # BLD AUTO: 0.02 10*3/MM3 (ref 0–0.2)
BASOPHILS NFR BLD AUTO: 0.6 % (ref 0–1.5)
BILIRUB SERPL-MCNC: 0.3 MG/DL (ref 0–1.2)
BUN SERPL-MCNC: 12 MG/DL (ref 8–23)
BUN/CREAT SERPL: 11.5 (ref 7–25)
CALCIUM SPEC-SCNC: 8.4 MG/DL (ref 8.6–10.5)
CHLORIDE SERPL-SCNC: 101 MMOL/L (ref 98–107)
CO2 SERPL-SCNC: 31 MMOL/L (ref 22–29)
CREAT SERPL-MCNC: 1.04 MG/DL (ref 0.76–1.27)
DEPRECATED RDW RBC AUTO: 44.3 FL (ref 37–54)
EGFRCR SERPLBLD CKD-EPI 2021: 74.9 ML/MIN/1.73
EOSINOPHIL # BLD AUTO: 0.15 10*3/MM3 (ref 0–0.4)
EOSINOPHIL NFR BLD AUTO: 4.3 % (ref 0.3–6.2)
ERYTHROCYTE [DISTWIDTH] IN BLOOD BY AUTOMATED COUNT: 13.7 % (ref 12.3–15.4)
GLOBULIN UR ELPH-MCNC: 2.5 GM/DL
GLUCOSE SERPL-MCNC: 84 MG/DL (ref 65–99)
HCT VFR BLD AUTO: 31.5 % (ref 37.5–51)
HGB BLD-MCNC: 10.4 G/DL (ref 13–17.7)
IMM GRANULOCYTES # BLD AUTO: 0.01 10*3/MM3 (ref 0–0.05)
IMM GRANULOCYTES NFR BLD AUTO: 0.3 % (ref 0–0.5)
INR PPP: 1.36 (ref 0.9–1.1)
LYMPHOCYTES # BLD AUTO: 0.89 10*3/MM3 (ref 0.7–3.1)
LYMPHOCYTES NFR BLD AUTO: 25.6 % (ref 19.6–45.3)
MAGNESIUM SERPL-MCNC: 1.8 MG/DL (ref 1.6–2.4)
MCH RBC QN AUTO: 29.3 PG (ref 26.6–33)
MCHC RBC AUTO-ENTMCNC: 33 G/DL (ref 31.5–35.7)
MCV RBC AUTO: 88.7 FL (ref 79–97)
MONOCYTES # BLD AUTO: 0.37 10*3/MM3 (ref 0.1–0.9)
MONOCYTES NFR BLD AUTO: 10.7 % (ref 5–12)
NEUTROPHILS NFR BLD AUTO: 2.03 10*3/MM3 (ref 1.7–7)
NEUTROPHILS NFR BLD AUTO: 58.5 % (ref 42.7–76)
NRBC BLD AUTO-RTO: 0 /100 WBC (ref 0–0.2)
PHOSPHATE SERPL-MCNC: 2.1 MG/DL (ref 2.5–4.5)
PHOSPHATE SERPL-MCNC: 2.9 MG/DL (ref 2.5–4.5)
PLATELET # BLD AUTO: 160 10*3/MM3 (ref 140–450)
PMV BLD AUTO: 10.1 FL (ref 6–12)
POTASSIUM SERPL-SCNC: 4.4 MMOL/L (ref 3.5–5.2)
PROT SERPL-MCNC: 5.4 G/DL (ref 6–8.5)
PROTHROMBIN TIME: 17 SECONDS (ref 11.7–14.2)
RBC # BLD AUTO: 3.55 10*6/MM3 (ref 4.14–5.8)
SODIUM SERPL-SCNC: 137 MMOL/L (ref 136–145)
WBC NRBC COR # BLD: 3.47 10*3/MM3 (ref 3.4–10.8)

## 2023-08-08 PROCEDURE — 83735 ASSAY OF MAGNESIUM: CPT | Performed by: STUDENT IN AN ORGANIZED HEALTH CARE EDUCATION/TRAINING PROGRAM

## 2023-08-08 PROCEDURE — 88312 SPECIAL STAINS GROUP 1: CPT | Performed by: INTERNAL MEDICINE

## 2023-08-08 PROCEDURE — 0DB58ZX EXCISION OF ESOPHAGUS, VIA NATURAL OR ARTIFICIAL OPENING ENDOSCOPIC, DIAGNOSTIC: ICD-10-PCS | Performed by: INTERNAL MEDICINE

## 2023-08-08 PROCEDURE — 88305 TISSUE EXAM BY PATHOLOGIST: CPT | Performed by: INTERNAL MEDICINE

## 2023-08-08 PROCEDURE — 85610 PROTHROMBIN TIME: CPT | Performed by: INTERNAL MEDICINE

## 2023-08-08 PROCEDURE — 84100 ASSAY OF PHOSPHORUS: CPT | Performed by: STUDENT IN AN ORGANIZED HEALTH CARE EDUCATION/TRAINING PROGRAM

## 2023-08-08 PROCEDURE — G0378 HOSPITAL OBSERVATION PER HR: HCPCS

## 2023-08-08 PROCEDURE — 43239 EGD BIOPSY SINGLE/MULTIPLE: CPT | Performed by: INTERNAL MEDICINE

## 2023-08-08 PROCEDURE — 80053 COMPREHEN METABOLIC PANEL: CPT | Performed by: INTERNAL MEDICINE

## 2023-08-08 PROCEDURE — 97530 THERAPEUTIC ACTIVITIES: CPT

## 2023-08-08 PROCEDURE — 94799 UNLISTED PULMONARY SVC/PX: CPT

## 2023-08-08 PROCEDURE — 88313 SPECIAL STAINS GROUP 2: CPT | Performed by: INTERNAL MEDICINE

## 2023-08-08 PROCEDURE — 25010000002 PROPOFOL 10 MG/ML EMULSION: Performed by: REGISTERED NURSE

## 2023-08-08 PROCEDURE — 85025 COMPLETE CBC W/AUTO DIFF WBC: CPT | Performed by: STUDENT IN AN ORGANIZED HEALTH CARE EDUCATION/TRAINING PROGRAM

## 2023-08-08 PROCEDURE — 94664 DEMO&/EVAL PT USE INHALER: CPT

## 2023-08-08 PROCEDURE — 88342 IMHCHEM/IMCYTCHM 1ST ANTB: CPT | Performed by: INTERNAL MEDICINE

## 2023-08-08 RX ORDER — PROPOFOL 10 MG/ML
VIAL (ML) INTRAVENOUS AS NEEDED
Status: DISCONTINUED | OUTPATIENT
Start: 2023-08-08 | End: 2023-08-08 | Stop reason: SURG

## 2023-08-08 RX ORDER — SODIUM CHLORIDE 9 MG/ML
30 INJECTION, SOLUTION INTRAVENOUS CONTINUOUS PRN
Status: DISCONTINUED | OUTPATIENT
Start: 2023-08-08 | End: 2023-08-10 | Stop reason: HOSPADM

## 2023-08-08 RX ORDER — SODIUM PHOSPHATE IN 0.9 % NACL 15MMOL/100
15 PLASTIC BAG, INJECTION (ML) INTRAVENOUS ONCE
Status: COMPLETED | OUTPATIENT
Start: 2023-08-08 | End: 2023-08-08

## 2023-08-08 RX ORDER — LIDOCAINE HYDROCHLORIDE 20 MG/ML
INJECTION, SOLUTION INFILTRATION; PERINEURAL AS NEEDED
Status: DISCONTINUED | OUTPATIENT
Start: 2023-08-08 | End: 2023-08-08 | Stop reason: SURG

## 2023-08-08 RX ORDER — ONDANSETRON 2 MG/ML
4 INJECTION INTRAMUSCULAR; INTRAVENOUS ONCE AS NEEDED
Status: DISCONTINUED | OUTPATIENT
Start: 2023-08-08 | End: 2023-08-08 | Stop reason: HOSPADM

## 2023-08-08 RX ADMIN — PROPOFOL 20 MG: 10 INJECTION, EMULSION INTRAVENOUS at 10:00

## 2023-08-08 RX ADMIN — POLYETHYLENE GLYCOL 3350 17 G: 17 POWDER, FOR SOLUTION ORAL at 10:51

## 2023-08-08 RX ADMIN — LIDOCAINE HYDROCHLORIDE 100 MG: 20 INJECTION, SOLUTION INFILTRATION; PERINEURAL at 09:53

## 2023-08-08 RX ADMIN — SODIUM PHOSPHATE, MONOBASIC, MONOHYDRATE AND SODIUM PHOSPHATE, DIBASIC, ANHYDROUS 15 MMOL: 276; 142 INJECTION, SOLUTION INTRAVENOUS at 11:26

## 2023-08-08 RX ADMIN — APIXABAN 5 MG: 5 TABLET, FILM COATED ORAL at 21:39

## 2023-08-08 RX ADMIN — FUROSEMIDE 40 MG: 40 TABLET ORAL at 10:46

## 2023-08-08 RX ADMIN — CASTOR OIL AND BALSAM, PERU 1 APPLICATION: 788; 87 OINTMENT TOPICAL at 21:39

## 2023-08-08 RX ADMIN — PRAMIPEXOLE DIHYDROCHLORIDE 1.5 MG: 1.5 TABLET ORAL at 21:39

## 2023-08-08 RX ADMIN — PRAMIPEXOLE DIHYDROCHLORIDE 1.5 MG: 1.5 TABLET ORAL at 10:52

## 2023-08-08 RX ADMIN — TAMSULOSIN HYDROCHLORIDE 0.4 MG: 0.4 CAPSULE ORAL at 10:53

## 2023-08-08 RX ADMIN — CASTOR OIL AND BALSAM, PERU 1 APPLICATION: 788; 87 OINTMENT TOPICAL at 11:26

## 2023-08-08 RX ADMIN — TIOTROPIUM BROMIDE INHALATION SPRAY 2 PUFF: 3.12 SPRAY, METERED RESPIRATORY (INHALATION) at 08:22

## 2023-08-08 RX ADMIN — Medication 10 ML: at 21:39

## 2023-08-08 RX ADMIN — PROPOFOL 30 MG: 10 INJECTION, EMULSION INTRAVENOUS at 09:57

## 2023-08-08 RX ADMIN — METOPROLOL TARTRATE 50 MG: 50 TABLET, FILM COATED ORAL at 10:50

## 2023-08-08 RX ADMIN — SENNOSIDES AND DOCUSATE SODIUM 2 TABLET: 50; 8.6 TABLET ORAL at 10:53

## 2023-08-08 RX ADMIN — SODIUM CHLORIDE 30 ML/HR: 9 INJECTION, SOLUTION INTRAVENOUS at 09:43

## 2023-08-08 RX ADMIN — METOPROLOL TARTRATE 50 MG: 50 TABLET, FILM COATED ORAL at 21:39

## 2023-08-08 RX ADMIN — POTASSIUM CHLORIDE 10 MEQ: 750 TABLET, EXTENDED RELEASE ORAL at 10:52

## 2023-08-08 RX ADMIN — SODIUM CHLORIDE: 9 INJECTION, SOLUTION INTRAVENOUS at 10:03

## 2023-08-08 RX ADMIN — PROPOFOL 80 MG: 10 INJECTION, EMULSION INTRAVENOUS at 09:53

## 2023-08-08 RX ADMIN — BISACODYL 5 MG: 5 TABLET ORAL at 10:46

## 2023-08-08 RX ADMIN — ATORVASTATIN CALCIUM 40 MG: 20 TABLET, FILM COATED ORAL at 21:39

## 2023-08-08 RX ADMIN — FUROSEMIDE 40 MG: 40 TABLET ORAL at 17:21

## 2023-08-08 NOTE — ANESTHESIA PREPROCEDURE EVALUATION
Anesthesia Evaluation     Patient summary reviewed and Nursing notes reviewed                Airway   Mallampati: II  TM distance: >3 FB  Neck ROM: limited  Dental      Pulmonary    (+) a smoker Former, COPD,shortness of breath, sleep apnea on CPAP  Cardiovascular     ECG reviewed  PT is on anticoagulation therapy  Patient on routine beta blocker  Rhythm: regular  Rate: normal    (+) hypertension, past MI dysrhythmias Paroxysmal Atrial Fib, CHF , DVT, hyperlipidemia      Neuro/Psych  (+) tremors, numbness, psychiatric history Anxiety and Depression  GI/Hepatic/Renal/Endo    (+) hiatal hernia, GI bleeding , renal disease CRI and stones    Musculoskeletal (-) negative ROS    Abdominal    Substance History - negative use     OB/GYN negative ob/gyn ROS         Other      history of cancer                    Anesthesia Plan    ASA 3     MAC     intravenous induction     Anesthetic plan, risks, benefits, and alternatives have been provided, discussed and informed consent has been obtained with: patient.    Plan discussed with CRNA.      CODE STATUS:    Code Status (Patient has no pulse and is not breathing): CPR (Attempt to Resuscitate)  Medical Interventions (Patient has pulse or is breathing): Full Support  Release to patient: Routine Release

## 2023-08-08 NOTE — PLAN OF CARE
Goal Outcome Evaluation:         A/O x4, room air, NSR on monitor with frequent PVCs and runs of trigeminy and bigeminy. BM overnight. FC in place, FC care provided. Pt refusing Q2 turns, education reinforced. No complaints of pain or nausea. NPO since midnight for EGD. VSS.

## 2023-08-08 NOTE — PROGRESS NOTES
Name: Jose Hurtado ADMIT: 2023   : 1950  PCP: Brandin Bolton MD    MRN: [id number] LOS: 0 days   AGE/SEX: 73 y.o. male  ROOM: [facility]     Subjective   Subjective   No complaints today.  Discussed with wife at bedside.    Objective   Objective   Vital Signs  Temp:  [97.3 øF (36.3 øC)] 97.3 øF (36.3 øC)  Heart Rate:  [59-75] 65  Resp:  [16-18] 18  BP: ()/(40-73) 109/43  SpO2:  [97 %-100 %] 98 %  on   ;   Device (Oxygen Therapy): room air  Body mass index is 20.16 kg/mý.  Physical Exam  Constitutional:       General: He is not in acute distress.     Appearance: He is ill-appearing.   Eyes:      Extraocular Movements: Extraocular movements intact.      Pupils: Pupils are equal, round, and reactive to light.   Cardiovascular:      Rate and Rhythm: Normal rate.   Pulmonary:      Effort: Pulmonary effort is normal.      Breath sounds: No stridor.   Abdominal:      General: There is no distension.      Tenderness: There is no abdominal tenderness. There is no guarding or rebound.      Hernia: No hernia is present.   Skin:     Coloration: Skin is not jaundiced.   Neurological:      General: No focal deficit present.      Mental Status: He is alert and oriented to person, place, and time.   Psychiatric:         Mood and Affect: Mood normal.         Behavior: Behavior normal.       Results Review     I reviewed the patient's new clinical results.  Results from last 7 days   Lab Units 23  0623  0734 23  1427   WBC 10*3/mm3 3.47 3.61 3.23* 3.69   HEMOGLOBIN g/dL 10.4* 10.2* 10.2* 11.1*   PLATELETS 10*3/mm3 160 148 149 146       Results from last 7 days   Lab Units 23  0617 23  0734 23  1427   SODIUM mmol/L 139 137 135*   POTASSIUM mmol/L 3.9 4.4 4.7   CHLORIDE mmol/L 103 105 105   CO2 mmol/L 29.2* 27.0 24.0   BUN mg/dL 14 18 20   CREATININE mg/dL 1.11 0.96 1.11   GLUCOSE mg/dL 94 91 109*   EGFR mL/min/1.73 69.2 82.4 69.2       Results from last 7 days    Lab Units 08/05/23  1427   ALBUMIN g/dL 3.3*   BILIRUBIN mg/dL 0.2   ALK PHOS U/L 80   AST (SGOT) U/L 14   ALT (SGPT) U/L 10       Results from last 7 days   Lab Units 08/07/23  0617 08/06/23 2021 08/06/23  0734 08/05/23  1427   CALCIUM mg/dL 8.1*  --  8.6 8.7   ALBUMIN g/dL  --   --   --  3.3*   MAGNESIUM mg/dL 1.9  --  2.2 2.1   PHOSPHORUS mg/dL 2.9 2.2* 2.1* 2.3*         No results found for: HGBA1C, POCGLU    No radiology results for the last day  Scheduled Medications  apixaban, 5 mg, Oral, Q12H  atorvastatin, 40 mg, Oral, Nightly  bisacodyl, 5 mg, Oral, Daily  castor oil-balsam peru, 1 application , Topical, Q12H  furosemide, 40 mg, Oral, BID  metoprolol tartrate, 50 mg, Oral, BID  pantoprazole, 40 mg, Oral, Q AM  PARoxetine, 40 mg, Oral, QAM  polyethylene glycol, 17 g, Oral, Daily  potassium chloride, 10 mEq, Oral, Daily  pramipexole, 1.5 mg, Oral, BID  senna-docusate sodium, 2 tablet, Oral, BID  sodium chloride, 10 mL, Intravenous, Q12H  tamsulosin, 0.4 mg, Oral, Daily  tiotropium bromide monohydrate, 2 puff, Inhalation, Daily    Infusions  lactated ringers, 30 mL/hr  sodium chloride, 1,000 mL, Last Rate: 25 mL/hr at 08/07/23 0949    Diet  NPO Diet NPO Type: Sips with Meds       Assessment/Plan     Active Hospital Problems    Diagnosis  POA    **Nausea [R11.0]  Yes    Weight loss [R63.4]  Unknown    Other symptoms and signs concerning food and fluid intake [R63.8]  Yes    Dysphagia [R13.10]  Yes    Severe malnutrition [E43]  Yes    Acute urinary retention [R33.8]  Yes    Nausea and vomiting [R11.2]  Unknown    Chronic obstructive pulmonary disease [J44.9]  Yes    Dysphagia [R13.10]  Unknown    CKD (chronic kidney disease) stage 2, GFR 60-89 ml/min [N18.2]  Yes    Chronic anticoagulation [Z79.01]  Not Applicable    History of recurrent deep vein thrombosis (DVT) [Z86.718]  Not Applicable    Hypertension [I10]  Yes    History of esophageal cancer [Z85.01]  Yes      Resolved Hospital Problems   No resolved  problems to display.       73 y.o. male admitted with Nausea.      08/08/23  EGD showed retained food though no other acute etiology of patient's symptoms.  Plan for GES today.    N/V  Severe malnutrition  Dysphagia  Esophageal CA (s/p esophagectomy)  -SLP, Nutrition consults  -GI following  -EGD aborted 8/7/2023 due to inadequate prep; repeat on 8/8 with large amount of food in the stomach  -Plan for gastric emptying study on 8/8/2023    Intraabdominal infection  -From recent hospital admission, seen on CT w/ colitis/proctitis  -Flex sig on 8/2 with 2 small ulcers in rectum and rectosigmoid colon, nonbleeding internal hemorrhoids  -Continue cefdinir and Flagyl to complete 7 days    Acute urinary retention  -Failed voiding trial prior to discharge, King catheter in place  -OP follow-up with urology    COPD, not in exacerbation  -Symbicort, Spiriva  -DuoNebs prn     HLD  -atorvastatin 40mg qhs     Afib w/ RVR  -RC: metoprolol 50mg BID  -AC: apixaban     Hx DVT  APL syndrome  -apixaban as above     Depression  -Paxil 40mg qam    Chronic myocardial injury  -trop 59 > 61; no further ischemic eval indicated at this time      Eliquis (home med) for DVT prophylaxis.  Discussed with patient.  Anticipate discharge home when cleared by consultants      Carloz Ortiz MD  [facility]  08/08/23  07:50 EDT

## 2023-08-08 NOTE — PLAN OF CARE
Goal Outcome Evaluation:  Plan of Care Reviewed With: patient        Progress: no change  Outcome Evaluation: Pt seen by PT this date for tx. Pt SBA for supine to sit. Pt donned shoes IND at EOB w/ SBA. Pt stood from low bed req CGA and use of fww. Pt amb around nsg station w/ CGA and use of fww. Pt's NOEL narrow w/ L fot IR noted. Pt cued several times to widen NOEL w/ scissoring noted a few times. Pt req several brief rests of approx 15 sec. Pt returned to room to perform B LE ther ex for strengthening. Pt seated at EOB w/ OT in room at end of session. PT will prog as pt laron.      Anticipated Discharge Disposition (PT): home with assist, home with home health

## 2023-08-08 NOTE — THERAPY TREATMENT NOTE
Patient Name: Jose Hurtado  : 1950    MRN: [id number]                              Today's Date: 2023       Admit Date: 2023    Visit Dx:     ICD-10-CM ICD-9-CM   1. Nausea  R11.0 787.02   2. Dehydration  E86.0 276.51   3. Dysphagia, unspecified type  R13.10 787.20   4. Nausea and vomiting, unspecified vomiting type  R11.2 787.01   5. Weight loss  R63.4 783.21   6. Oral phase dysphagia  R13.11 787.21     Patient Active Problem List   Diagnosis    History of esophageal cancer    Adenomatous polyp of colon    Malignant neoplasm of prostate    RLS (restless legs syndrome)    History of DVT (deep vein thrombosis)    Recurrent major depressive disorder, in partial remission    Hypertension    Anemia    Insomnia due to other mental disorder    Peripheral polyneuropathy    B12 deficiency    Lymphedema    Iron deficiency    Gastroparesis    History of recurrent deep vein thrombosis (DVT)    Tremor of both hands    Gastrointestinal hemorrhage    Acute blood loss anemia    Pancytopenia    Chronic venous hypertension due to DVT    Bleeding hemorrhoid    Malabsorption of iron    Iron deficiency anemia    History of esophageal cancer    Abnormal CT scan    Generalized weakness    Chronic anticoagulation    CKD (chronic kidney disease) stage 2, GFR 60-89 ml/min    Dysphagia    NSTEMI (non-ST elevated myocardial infarction)    Bronchitis    Dyspnea    Elevated troponin    Atrial fibrillation    Chronic obstructive pulmonary disease    Congestive heart failure    Gait instability    Dark stools    Proctitis    Nausea and vomiting    Acute urinary retention    Severe malnutrition    Nausea    Other symptoms and signs concerning food and fluid intake    Dysphagia    Weight loss     Past Medical History:   Diagnosis Date    Acute deep vein thrombosis (DVT) of popliteal vein of left lower extremity 2020    Anemia     Anti-phospholipid syndrome     On lifelong anticoagulation therapy    Bladder disorder      LEAKAGE   ON MED  wers pads    Bleeding hemorrhoids     Chronic kidney disease     Community acquired pneumonia     HISTORY OF IN 2014    Congestive heart failure     COPD (chronic obstructive pulmonary disease)     Deep venous thrombosis 2006, 2008    Left lower extremity multiple    Depression     Esophageal carcinoma 12/31/2014    had chemo and radiation prior surgery    Hemorrhoids     HH (hiatus hernia)     History of atrial fibrillation 2015    ONE EPISODE WHILE HOSPITALIZED    History of kidney stones     History of nephrolithiasis     History of pancreatitis     PT STATES MANY YEARS AGO    History of radiation therapy     History of transfusion     Hypertension     Long-term (current) use of anticoagulants, INR goal 2.0-3.0     Lymphedema     Malignant neoplasm of prostate     Other hyperlipidemia 01/30/2018 January 30, 2018 lipid panel risk 12.8%    Restless legs syndrome     Sleep apnea     OCCASIONALLY WEARS CPAP    Squamous carcinoma     on the head     Past Surgical History:   Procedure Laterality Date    APPENDECTOMY  1950    BRONCHOSCOPY      (Diagnostic)    CARDIAC CATHETERIZATION N/A 5/14/2022    Procedure: Left Heart Cath;  Surgeon: Eric So MD;  Location:  [facility];  Service: Cardiology;  Laterality: N/A;    CARDIAC CATHETERIZATION N/A 5/14/2022    Procedure: Coronary angiography;  Surgeon: Eric So MD;  Location:  [facility];  Service: Cardiology;  Laterality: N/A;    CARDIAC CATHETERIZATION N/A 5/14/2022    Procedure: Left ventriculography;  Surgeon: Eric So MD;  Location:  [facility];  Service: Cardiology;  Laterality: N/A;    CATARACT EXTRACTION Bilateral 2014    COLONOSCOPY  12/15/2014    Complete / Description: EH, IH, torts, stool, follow-up colonoscopy due in 5 years.    COLONOSCOPY N/A 6/13/2017    non-thrombosed external hemorrhoids, normal examined ileum, IH    COLONOSCOPY N/A 2/26/2019     Procedure: COLONOSCOPY with Cold Polypectomy;  Surgeon: Mulugeta Millan MD;  Location: [facility] ENDOSCOPY;  Service: Gastroenterology    COLONOSCOPY N/A 12/16/2020    Procedure: COLONOSCOPY to cecum into TI;  Surgeon: Mesha Sanchez MD;  Location: [facility] ENDOSCOPY;  Service: Gastroenterology;  Laterality: N/A;  pre: lower GI bleed  post: hemorrhoids     CYSTOSCOPY W/ LASER LITHOTRIPSY      ENDOSCOPY N/A 6/13/2017    Procedure: ESOPHAGOGASTRODUODENOSCOPY WITH COLD BIOPSY;  Surgeon: Lake Gonzalez MD;  Location: [facility] ENDOSCOPY;  Service:     ENDOSCOPY N/A 11/18/2021    Procedure: ESOPHAGOGASTRODUODENOSCOPY WITH  DILATATION WITH FLUOROSCOPY;  Surgeon: Jose Christine III, MD;  Location: [facility] ENDOSCOPY;  Service: Gastroenterology;  Laterality: N/A;  Pre: dysphagia, h/x of adinocarcinoma of esophagus  Post: same    ENDOSCOPY N/A 8/7/2023    Procedure: ESOPHAGOGASTRODUODENOSCOPY;  Surgeon: Jameel Valencia MD;  Location: [facility] ENDOSCOPY;  Service: Gastroenterology;  Laterality: N/A;  PRE- DYSPHAGIA  POST-ABORTED DUE TO RETAINED FOOD    ESOPHAGECTOMY      April 2015, stage IIB esophageal carcinoma, sub-total resection.    ESOPHAGECTOMY      Esophagectomy Subtotal Andrea Joe Procedure    EXCISION LESION  08/2012    Removal of Squamous Cell CA on Head    HAMMER TOE REPAIR  09/2014    Hammertoe Operation (Each Toe), 10/2014    HAMMER TOE REPAIR Left 10/3/2017    Procedure: Left second third and fourth distal interphalangeal joint resection with flexor tenotomy;  Surgeon: Mulugeta Lira MD;  Location: [facility] OR OSC;  Service:     HEMORRHOIDECTOMY N/A 12/23/2020    Procedure: HEMORRHOIDECTOMY;  Surgeon: Billy Julio Jr., MD;  Location: [facility] MAIN OR;  Service: General;  Laterality: N/A;    HERNIA REPAIR      incisional    JEJUNOSTOMY      Laparoscopic    JEJUNOSTOMY      tube removal     KNEE SURGERY Bilateral 1967, 1973, 1981    PATELLA SURGERY Left     removed    PILONIDAL CYST / SINUS  EXCISION      ID ARTHRP KNE CONDYLE&PLATU MEDIAL&LAT COMPARTMENTS Right 3/26/2018    Procedure: TOTAL KNEE ARTHROPLASTY;  Surgeon: Renny Solis MD;  Location: [facility] MAIN OR;  Service: Orthopedics    PROSTATECTOMY  2010    PYLOROPLASTY      SIGMOIDOSCOPY N/A 8/2/2023    Procedure: SIGMOIDOSCOPY FLEXIBLE;  Surgeon: Mesha Sanchez MD;  Location: [facility] ENDOSCOPY;  Service: Gastroenterology;  Laterality: N/A;  PRE- ABNORMAL CT  POST- HEMORRHOIDS, ULCERATION    SPINAL FUSION  02/1998    C 5,6    TONSILLECTOMY      UPPER GASTROINTESTINAL ENDOSCOPY  12/15/2014    LA Grade D esophagitis, Pardo's, HH, multiple duodenal ulcers    VENTRAL/INCISIONAL HERNIA REPAIR N/A 4/14/2016    Procedure: VENTRAL/INCISIONAL HERNIA REPAIR, open, with mesh, and component separation;  Surgeon: Darren Rivas MD;  Location: [facility] MAIN OR;  Service:       General Information       Row Name 08/08/23 1430          Physical Therapy Time and Intention    Document Type therapy note (daily note)  -     Mode of Treatment physical therapy  -       Row Name 08/08/23 1430          Cognition    Orientation Status (Cognition) oriented x 4  -       Row Name 08/08/23 1430          Safety Issues, Functional Mobility    Impairments Affecting Function (Mobility) balance;postural/trunk control;strength  -     Comment, Safety Issues/Impairments (Mobility) gt belt and non skid sneakers donned  -               User Key  (r) = Recorded By, (t) = Taken By, (c) = Cosigned By      Initials Name Provider Type    PH Huma Graves, PTA Physical Therapist Assistant                   Mobility       Row Name 08/08/23 1430          Bed Mobility    Bed Mobility supine-sit  -PH     Supine-Sit [place] (Bed Mobility) standby assist  -     Assistive Device (Bed Mobility) bed rails;head of bed elevated  -       Row Name 08/08/23 1430          Sit-Stand Transfer    Sit-Stand [place] (Transfers) contact guard  -     Assistive Device  "(Sit-Stand Transfers) walker, front-wheeled  -PH     Comment, (Sit-Stand Transfer) attempted from low bed w/ pt 6'5\"  -PH       Row Name 08/08/23 1430          Gait/Stairs (Locomotion)    [place] Level (Gait) contact guard;verbal cues;nonverbal cues (demo/gesture)  -PH     Assistive Device (Gait) walker, front-wheeled  -PH     Distance in Feet (Gait) 150' w/ several brief rests of approx 10 - 15 sec  -PH     Deviations/Abnormal Patterns (Gait) base of support, narrow;scissoring;jf decreased;festinating/shuffling;gait speed decreased;stride length decreased  -PH     Bilateral Gait Deviations forward flexed posture;heel strike decreased  -PH     [place] Level (Stairs) unable to assess  -PH     Comment, (Gait/Stairs) cues for postural correction as well as educ for upright posture to support spine and improve breathing; cues to widen NOEL w/ L foot IR noted; cues to remain closer to fww.  -PH               User Key  (r) = Recorded By, (t) = Taken By, (c) = Cosigned By      Initials Name Provider Type     Huma Graves PTA Physical Therapist Assistant                   Obj/Interventions       Row Name 08/08/23 1433          Motor Skills    Therapeutic Exercise other (see comments)  BAP, LAQ, seated march; x 20 reps each  -PH       Row Name 08/08/23 1433          Balance    Balance Assessment sitting static balance;standing static balance;sitting dynamic balance  -PH     Static Sitting Balance standby assist  -PH     Dynamic Sitting Balance standby assist  -PH     Static Standing Balance contact guard  -PH     Position/Device Used, Standing Balance walker, front-wheeled  -PH     Comment, Balance pt susy merino IND w/ extra time at EOB req SBA  -PH               User Key  (r) = Recorded By, (t) = Taken By, (c) = Cosigned By      Initials Name Provider Type    Huma Schaffer PTA Physical Therapist Assistant                   Goals/Plan    No documentation.                  Clinical " Impression       Row Name 08/08/23 1434          Pain    Pretreatment Pain Rating 0/10 - no pain  -PH     Posttreatment Pain Rating 0/10 - no pain  -PH     Additional Documentation Pain Scale: Numbers Pre/Post-Treatment (Group)  -PH       Row Name 08/08/23 1434          Plan of Care Review    Plan of Care Reviewed With patient  -PH     Progress no change  -PH     Outcome Evaluation Pt seen by PT this date for tx. Pt SBA for supine to sit. Pt donned shoes IND at EOB w/ SBA. Pt stood from low bed req CGA and use of fww. Pt amb around nsg station w/ CGA and use of fww. Pt's NOEL narrow w/ L fot IR noted. Pt cued several times to widen NOEL w/ scissoring noted a few times. Pt req several brief rests of approx 15 sec. Pt returned to room to perform B LE ther ex for strengthening. Pt seated at EOB w/ OT in room at end of session. PT will prog as pt laron.  -PH       Row Name 08/08/23 1434          Vital Signs    O2 Delivery Pre Treatment room air  -PH     Intra SpO2 (%) 100  -PH     O2 Delivery Intra Treatment room air  -PH     O2 Delivery Post Treatment room air  -PH       Row Name 08/08/23 1434          Positioning and Restraints    Pre-Treatment Position in bed  -PH     Post Treatment Position bed  -PH     In Bed fowlers;call light within reach;encouraged to call for assist;exit alarm on;notified nsg  -PH               User Key  (r) = Recorded By, (t) = Taken By, (c) = Cosigned By      Initials Name Provider Type    PH Huma Graves PTA Physical Therapist Assistant                   Outcome Measures       Row Name 08/08/23 1436          How much help from another person do you currently need...    Turning from your back to your side while in flat bed without using bedrails? 4  -PH     Moving from lying on back to sitting on the side of a flat bed without bedrails? 4  -PH     Moving to and from a bed to a chair (including a wheelchair)? 3  -PH     Standing up from a chair using your arms (e.g., wheelchair, bedside  chair)? 3  -PH     Climbing 3-5 steps with a railing? 2  -PH     To walk in hospital room? 3  -PH     AM-PAC 6 Clicks Score (PT) 19  -PH     Highest level of mobility 6 --> Walked 10 steps or more  -       Row Name 08/08/23 1436          Functional Assessment    Outcome Measure Options AM-PAC 6 Clicks Basic Mobility (PT)  -               User Key  (r) = Recorded By, (t) = Taken By, (c) = Cosigned By      Initials Name Provider Type    Huma Schaffer, PTA Physical Therapist Assistant                                 Physical Therapy Education       Title: PT OT SLP Therapies (Done)       Topic: Physical Therapy (Done)       Point: Mobility training (Done)       Learning Progress Summary             Patient Acceptance, E,TB,D, VU,NR by  at 8/8/2023 1437    Eager, E,TB,D, VU,DU by  at 8/7/2023 1725    Acceptance, E, VU,NR by MIKE at 8/6/2023 1433   Family Eager, E,TB,D, VU,DU by  at 8/7/2023 1725                         Point: Home exercise program (Done)       Learning Progress Summary             Patient Acceptance, E,TB,D, VU,NR by  at 8/8/2023 1437    Eager, E,TB,D, VU,DU by EUGENE at 8/7/2023 1725    Acceptance, E, VU,NR by MIKE at 8/6/2023 1433   Family Eager, E,TB,D, VU,DU by EUGENE at 8/7/2023 1725                         Point: Body mechanics (Done)       Learning Progress Summary             Patient Acceptance, E,TB,D, VU,NR by  at 8/8/2023 1437    Eager, E,TB,D, VU,DU by EUGENE at 8/7/2023 1725    Acceptance, E, VU,NR by MIKE at 8/6/2023 1433   Family Eager, E,TB,D, VU,DU by EUGENE at 8/7/2023 1725                         Point: Precautions (Done)       Learning Progress Summary             Patient Acceptance, E,TB,D, VU,NR by  at 8/8/2023 1437    Eager, E,TB,D, VU,DU by EUGENE at 8/7/2023 1725    Acceptance, E, VU,NR by MIKE at 8/6/2023 1433   Family CONCEPCIÓN Chong,CHRISTIANO PERERA, ZAN,JASSI by EUGENE at 8/7/2023 3351                                         User Key       Initials Effective Dates Name Provider Type Discipline    JM  03/07/18 -  Astrid Barba PTA Physical Therapist Assistant PT    CN 06/16/21 -  Naheed Hendrickson PT Physical Therapist PT    PH 06/16/21 -  Huma Graves PTA Physical Therapist Assistant PT                  PT Recommendation and Plan     Plan of Care Reviewed With: patient  Progress: no change  Outcome Evaluation: Pt seen by PT this date for tx. Pt SBA for supine to sit. Pt donned shoes IND at EOB w/ SBA. Pt stood from low bed req CGA and use of fww. Pt amb around nsg station w/ CGA and use of fww. Pt's NOEL narrow w/ L fot IR noted. Pt cued several times to widen NOEL w/ scissoring noted a few times. Pt req several brief rests of approx 15 sec. Pt returned to room to perform B LE ther ex for strengthening. Pt seated at EOB w/ OT in room at end of session. PT will prog as pt laron.     Time Calculation:         PT Charges       Row Name 08/08/23 1437             Time Calculation    Start Time 1409  -PH      Stop Time 1429  -PH      Time Calculation (min) 20 min  -PH      PT Received On 08/08/23  -PH      PT - Next Appointment 08/09/23  -PH         Timed Charges    26692 - PT Therapeutic Exercise Minutes 5  -PH      40893 - PT Therapeutic Activity Minutes 15  -PH         Total Minutes    Timed Charges Total Minutes 20  -PH       Total Minutes 20  -PH                User Key  (r) = Recorded By, (t) = Taken By, (c) = Cosigned By      Initials Name Provider Type    PH Huma Graves PTA Physical Therapist Assistant                  Therapy Charges for Today       Code Description Service Date Service Provider Modifiers Qty    97944408200  PT THERAPEUTIC ACT EA 15 MIN 8/8/2023 Huma Graves PTA GP 1            PT G-Codes  Outcome Measure Options: AM-PAC 6 Clicks Basic Mobility (PT)  AM-PAC 6 Clicks Score (PT): 19  PT Discharge Summary  Anticipated Discharge Disposition (PT): home with assist, home with home health    Huma Graves PTA  8/8/2023

## 2023-08-08 NOTE — ANESTHESIA POSTPROCEDURE EVALUATION
Patient: Jose Hurtado    Procedure Summary       Date: 08/08/23 Room / Location: [facility] ENDOSCOPY 4 / [facility] ENDOSCOPY    Anesthesia Start: 0950 Anesthesia Stop: 1009    Procedure: ESOPHAGOGASTRODUODENOSCOPY with bx (Esophagus) Diagnosis:       Oral phase dysphagia      (Oral phase dysphagia [R13.11])    Surgeons: Jameel Valencia MD Provider: Eric Soliman MD    Anesthesia Type: MAC ASA Status: 3            Anesthesia Type: MAC    Vitals  Vitals Value Taken Time   BP 95/53 08/08/23 1017   Temp     Pulse 57 08/08/23 1017   Resp 16 08/08/23 1017   SpO2 100 % 08/08/23 1017           Post Anesthesia Care and Evaluation    Patient location during evaluation: PACU  Patient participation: complete - patient participated  Level of consciousness: awake and alert  Pain management: adequate    Airway patency: patent  Anesthetic complications: No anesthetic complications    Cardiovascular status: acceptable  Respiratory status: acceptable  Hydration status: acceptable    Comments: --------------------            08/08/23               1046     --------------------   BP:       115/61     Pulse:      65       Resp:                Temp:                SpO2:               --------------------

## 2023-08-09 ENCOUNTER — APPOINTMENT (OUTPATIENT)
Dept: NUCLEAR MEDICINE | Facility: HOSPITAL | Age: 73
DRG: 368 | End: 2023-08-09
Payer: MEDICARE

## 2023-08-09 PROBLEM — E86.0 DEHYDRATION: Status: ACTIVE | Noted: 2023-08-09

## 2023-08-09 LAB
ANION GAP SERPL CALCULATED.3IONS-SCNC: 6.8 MMOL/L (ref 5–15)
BASOPHILS # BLD AUTO: 0.02 10*3/MM3 (ref 0–0.2)
BASOPHILS NFR BLD AUTO: 0.6 % (ref 0–1.5)
BUN SERPL-MCNC: 12 MG/DL (ref 8–23)
BUN/CREAT SERPL: 11.1 (ref 7–25)
CALCIUM SPEC-SCNC: 8.5 MG/DL (ref 8.6–10.5)
CHLORIDE SERPL-SCNC: 98 MMOL/L (ref 98–107)
CO2 SERPL-SCNC: 29.2 MMOL/L (ref 22–29)
CREAT SERPL-MCNC: 1.08 MG/DL (ref 0.76–1.27)
DEPRECATED RDW RBC AUTO: 44.3 FL (ref 37–54)
EGFRCR SERPLBLD CKD-EPI 2021: 71.6 ML/MIN/1.73
EOSINOPHIL # BLD AUTO: 0.2 10*3/MM3 (ref 0–0.4)
EOSINOPHIL NFR BLD AUTO: 5.6 % (ref 0.3–6.2)
ERYTHROCYTE [DISTWIDTH] IN BLOOD BY AUTOMATED COUNT: 13.7 % (ref 12.3–15.4)
GLUCOSE SERPL-MCNC: 84 MG/DL (ref 65–99)
HCT VFR BLD AUTO: 32.9 % (ref 37.5–51)
HGB BLD-MCNC: 10.7 G/DL (ref 13–17.7)
IMM GRANULOCYTES # BLD AUTO: 0 10*3/MM3 (ref 0–0.05)
IMM GRANULOCYTES NFR BLD AUTO: 0 % (ref 0–0.5)
LYMPHOCYTES # BLD AUTO: 0.87 10*3/MM3 (ref 0.7–3.1)
LYMPHOCYTES NFR BLD AUTO: 24.3 % (ref 19.6–45.3)
MAGNESIUM SERPL-MCNC: 2 MG/DL (ref 1.6–2.4)
MCH RBC QN AUTO: 29.1 PG (ref 26.6–33)
MCHC RBC AUTO-ENTMCNC: 32.5 G/DL (ref 31.5–35.7)
MCV RBC AUTO: 89.4 FL (ref 79–97)
MONOCYTES # BLD AUTO: 0.39 10*3/MM3 (ref 0.1–0.9)
MONOCYTES NFR BLD AUTO: 10.9 % (ref 5–12)
NEUTROPHILS NFR BLD AUTO: 2.1 10*3/MM3 (ref 1.7–7)
NEUTROPHILS NFR BLD AUTO: 58.6 % (ref 42.7–76)
NRBC BLD AUTO-RTO: 0 /100 WBC (ref 0–0.2)
PHOSPHATE SERPL-MCNC: 2.4 MG/DL (ref 2.5–4.5)
PLATELET # BLD AUTO: 169 10*3/MM3 (ref 140–450)
PMV BLD AUTO: 9.9 FL (ref 6–12)
POTASSIUM SERPL-SCNC: 3.8 MMOL/L (ref 3.5–5.2)
RBC # BLD AUTO: 3.68 10*6/MM3 (ref 4.14–5.8)
SODIUM SERPL-SCNC: 134 MMOL/L (ref 136–145)
WBC NRBC COR # BLD: 3.58 10*3/MM3 (ref 3.4–10.8)

## 2023-08-09 PROCEDURE — 80048 BASIC METABOLIC PNL TOTAL CA: CPT | Performed by: INTERNAL MEDICINE

## 2023-08-09 PROCEDURE — 84100 ASSAY OF PHOSPHORUS: CPT | Performed by: INTERNAL MEDICINE

## 2023-08-09 PROCEDURE — 85025 COMPLETE CBC W/AUTO DIFF WBC: CPT | Performed by: STUDENT IN AN ORGANIZED HEALTH CARE EDUCATION/TRAINING PROGRAM

## 2023-08-09 PROCEDURE — 83735 ASSAY OF MAGNESIUM: CPT | Performed by: INTERNAL MEDICINE

## 2023-08-09 PROCEDURE — 78264 GASTRIC EMPTYING IMG STUDY: CPT

## 2023-08-09 PROCEDURE — 99232 SBSQ HOSP IP/OBS MODERATE 35: CPT | Performed by: INTERNAL MEDICINE

## 2023-08-09 PROCEDURE — 25010000002 METOCLOPRAMIDE PER 10 MG: Performed by: INTERNAL MEDICINE

## 2023-08-09 PROCEDURE — 97530 THERAPEUTIC ACTIVITIES: CPT

## 2023-08-09 PROCEDURE — 0 TECHNETIUM SULFUR COLLOID: Performed by: STUDENT IN AN ORGANIZED HEALTH CARE EDUCATION/TRAINING PROGRAM

## 2023-08-09 PROCEDURE — A9541 TC99M SULFUR COLLOID: HCPCS | Performed by: STUDENT IN AN ORGANIZED HEALTH CARE EDUCATION/TRAINING PROGRAM

## 2023-08-09 RX ORDER — METOCLOPRAMIDE HYDROCHLORIDE 5 MG/ML
5 INJECTION INTRAMUSCULAR; INTRAVENOUS EVERY 6 HOURS
Status: DISCONTINUED | OUTPATIENT
Start: 2023-08-09 | End: 2023-08-10 | Stop reason: HOSPADM

## 2023-08-09 RX ADMIN — METOCLOPRAMIDE 5 MG: 5 INJECTION, SOLUTION INTRAMUSCULAR; INTRAVENOUS at 14:31

## 2023-08-09 RX ADMIN — ATORVASTATIN CALCIUM 40 MG: 20 TABLET, FILM COATED ORAL at 21:26

## 2023-08-09 RX ADMIN — TAMSULOSIN HYDROCHLORIDE 0.4 MG: 0.4 CAPSULE ORAL at 12:26

## 2023-08-09 RX ADMIN — CASTOR OIL AND BALSAM, PERU 1 APPLICATION: 788; 87 OINTMENT TOPICAL at 12:25

## 2023-08-09 RX ADMIN — Medication 10 ML: at 21:26

## 2023-08-09 RX ADMIN — CASTOR OIL AND BALSAM, PERU 1 APPLICATION: 788; 87 OINTMENT TOPICAL at 21:26

## 2023-08-09 RX ADMIN — PRAMIPEXOLE DIHYDROCHLORIDE 1.5 MG: 1.5 TABLET ORAL at 21:26

## 2023-08-09 RX ADMIN — POTASSIUM CHLORIDE 10 MEQ: 750 TABLET, EXTENDED RELEASE ORAL at 12:25

## 2023-08-09 RX ADMIN — APIXABAN 5 MG: 5 TABLET, FILM COATED ORAL at 12:26

## 2023-08-09 RX ADMIN — PRAMIPEXOLE DIHYDROCHLORIDE 1.5 MG: 1.5 TABLET ORAL at 12:26

## 2023-08-09 RX ADMIN — TECHNETIUM TC 99M SULFUR COLLOID 1 DOSE: KIT at 07:36

## 2023-08-09 RX ADMIN — METOCLOPRAMIDE 5 MG: 5 INJECTION, SOLUTION INTRAMUSCULAR; INTRAVENOUS at 21:25

## 2023-08-09 RX ADMIN — Medication 10 ML: at 12:31

## 2023-08-09 RX ADMIN — APIXABAN 5 MG: 5 TABLET, FILM COATED ORAL at 21:25

## 2023-08-09 NOTE — THERAPY TREATMENT NOTE
Patient Name: Jose Hurtado  : 1950    MRN: [id number]                              Today's Date: 2023       Admit Date: 2023    Visit Dx:     ICD-10-CM ICD-9-CM   1. Nausea  R11.0 787.02   2. Dehydration  E86.0 276.51   3. Dysphagia, unspecified type  R13.10 787.20   4. Nausea and vomiting, unspecified vomiting type  R11.2 787.01   5. Weight loss  R63.4 783.21   6. Oral phase dysphagia  R13.11 787.21     Patient Active Problem List   Diagnosis    History of esophageal cancer    Adenomatous polyp of colon    Malignant neoplasm of prostate    RLS (restless legs syndrome)    History of DVT (deep vein thrombosis)    Recurrent major depressive disorder, in partial remission    Hypertension    Anemia    Insomnia due to other mental disorder    Peripheral polyneuropathy    B12 deficiency    Lymphedema    Iron deficiency    Gastroparesis    History of recurrent deep vein thrombosis (DVT)    Tremor of both hands    Gastrointestinal hemorrhage    Acute blood loss anemia    Pancytopenia    Chronic venous hypertension due to DVT    Bleeding hemorrhoid    Malabsorption of iron    Iron deficiency anemia    History of esophageal cancer    Abnormal CT scan    Generalized weakness    Chronic anticoagulation    CKD (chronic kidney disease) stage 2, GFR 60-89 ml/min    Dysphagia    NSTEMI (non-ST elevated myocardial infarction)    Bronchitis    Dyspnea    Elevated troponin    Atrial fibrillation    Chronic obstructive pulmonary disease    Congestive heart failure    Gait instability    Dark stools    Proctitis    Nausea and vomiting    Acute urinary retention    Severe malnutrition    Nausea    Other symptoms and signs concerning food and fluid intake    Dysphagia    Weight loss     Past Medical History:   Diagnosis Date    Acute deep vein thrombosis (DVT) of popliteal vein of left lower extremity 2020    Anemia     Anti-phospholipid syndrome     On lifelong anticoagulation therapy    Bladder disorder      LEAKAGE   ON MED  wers pads    Bleeding hemorrhoids     Chronic kidney disease     Community acquired pneumonia     HISTORY OF IN 2014    Congestive heart failure     COPD (chronic obstructive pulmonary disease)     Deep venous thrombosis 2006, 2008    Left lower extremity multiple    Depression     Esophageal carcinoma 12/31/2014    had chemo and radiation prior surgery    Hemorrhoids     HH (hiatus hernia)     History of atrial fibrillation 2015    ONE EPISODE WHILE HOSPITALIZED    History of kidney stones     History of nephrolithiasis     History of pancreatitis     PT STATES MANY YEARS AGO    History of radiation therapy     History of transfusion     Hypertension     Long-term (current) use of anticoagulants, INR goal 2.0-3.0     Lymphedema     Malignant neoplasm of prostate     Other hyperlipidemia 01/30/2018 January 30, 2018 lipid panel risk 12.8%    Restless legs syndrome     Sleep apnea     OCCASIONALLY WEARS CPAP    Squamous carcinoma     on the head     Past Surgical History:   Procedure Laterality Date    APPENDECTOMY  1950    BRONCHOSCOPY      (Diagnostic)    CARDIAC CATHETERIZATION N/A 5/14/2022    Procedure: Left Heart Cath;  Surgeon: Eric So MD;  Location:  [facility];  Service: Cardiology;  Laterality: N/A;    CARDIAC CATHETERIZATION N/A 5/14/2022    Procedure: Coronary angiography;  Surgeon: Eric So MD;  Location:  [facility];  Service: Cardiology;  Laterality: N/A;    CARDIAC CATHETERIZATION N/A 5/14/2022    Procedure: Left ventriculography;  Surgeon: Eric So MD;  Location:  [facility];  Service: Cardiology;  Laterality: N/A;    CATARACT EXTRACTION Bilateral 2014    COLONOSCOPY  12/15/2014    Complete / Description: EH, IH, torts, stool, follow-up colonoscopy due in 5 years.    COLONOSCOPY N/A 6/13/2017    non-thrombosed external hemorrhoids, normal examined ileum, IH    COLONOSCOPY N/A 2/26/2019     Procedure: COLONOSCOPY with Cold Polypectomy;  Surgeon: Mulugeta Millan MD;  Location: [facility] ENDOSCOPY;  Service: Gastroenterology    COLONOSCOPY N/A 12/16/2020    Procedure: COLONOSCOPY to cecum into TI;  Surgeon: Mesha Sanchez MD;  Location: [facility] ENDOSCOPY;  Service: Gastroenterology;  Laterality: N/A;  pre: lower GI bleed  post: hemorrhoids     CYSTOSCOPY W/ LASER LITHOTRIPSY      ENDOSCOPY N/A 6/13/2017    Procedure: ESOPHAGOGASTRODUODENOSCOPY WITH COLD BIOPSY;  Surgeon: Lake Gonzalez MD;  Location: [facility] ENDOSCOPY;  Service:     ENDOSCOPY N/A 11/18/2021    Procedure: ESOPHAGOGASTRODUODENOSCOPY WITH  DILATATION WITH FLUOROSCOPY;  Surgeon: Jose Christine III, MD;  Location: [facility] ENDOSCOPY;  Service: Gastroenterology;  Laterality: N/A;  Pre: dysphagia, h/x of adinocarcinoma of esophagus  Post: same    ENDOSCOPY N/A 8/7/2023    Procedure: ESOPHAGOGASTRODUODENOSCOPY;  Surgeon: Jameel Valencia MD;  Location: [facility] ENDOSCOPY;  Service: Gastroenterology;  Laterality: N/A;  PRE- DYSPHAGIA  POST-ABORTED DUE TO RETAINED FOOD    ENDOSCOPY N/A 8/8/2023    Procedure: ESOPHAGOGASTRODUODENOSCOPY with bx;  Surgeon: Jameel Valencia MD;  Location: [facility] ENDOSCOPY;  Service: Gastroenterology;  Laterality: N/A;  pre: N/V, abd pain  post: esophageal nodule, retained food    ESOPHAGECTOMY      April 2015, stage IIB esophageal carcinoma, sub-total resection.    ESOPHAGECTOMY      Esophagectomy Subtotal [facility] Joe Procedure    EXCISION LESION  08/2012    Removal of Squamous Cell CA on Head    HAMMER TOE REPAIR  09/2014    Hammertoe Operation (Each Toe), 10/2014    HAMMER TOE REPAIR Left 10/3/2017    Procedure: Left second third and fourth distal interphalangeal joint resection with flexor tenotomy;  Surgeon: Mulugeta Lira MD;  Location: [facility] OR OSC;  Service:     HEMORRHOIDECTOMY N/A 12/23/2020    Procedure: HEMORRHOIDECTOMY;  Surgeon: Billy Julio Jr., MD;  Location: [facility] MAIN OR;   Service: General;  Laterality: N/A;    HERNIA REPAIR      incisional    JEJUNOSTOMY      Laparoscopic    JEJUNOSTOMY      tube removal     KNEE SURGERY Bilateral 1967, 1973, 1981    PATELLA SURGERY Left     removed    PILONIDAL CYST / SINUS EXCISION      TX ARTHRP KNE CONDYLE&PLATU MEDIAL&LAT COMPARTMENTS Right 3/26/2018    Procedure: TOTAL KNEE ARTHROPLASTY;  Surgeon: Renny Solis MD;  Location: [facility] MAIN OR;  Service: Orthopedics    PROSTATECTOMY  2010    PYLOROPLASTY      SIGMOIDOSCOPY N/A 8/2/2023    Procedure: SIGMOIDOSCOPY FLEXIBLE;  Surgeon: Mesha Sanchez MD;  Location: [facility] ENDOSCOPY;  Service: Gastroenterology;  Laterality: N/A;  PRE- ABNORMAL CT  POST- HEMORRHOIDS, ULCERATION    SPINAL FUSION  02/1998    C 5,6    TONSILLECTOMY      UPPER GASTROINTESTINAL ENDOSCOPY  12/15/2014    LA Grade D esophagitis, Pardo's, HH, multiple duodenal ulcers    VENTRAL/INCISIONAL HERNIA REPAIR N/A 4/14/2016    Procedure: VENTRAL/INCISIONAL HERNIA REPAIR, open, with mesh, and component separation;  Surgeon: Darren Rivas MD;  Location: [facility];  Service:       General Information       Row Name 08/09/23 1325          Physical Therapy Time and Intention    Document Type therapy note (daily note)  -PH     Mode of Treatment physical therapy  -       Row Name 08/09/23 1325          General Information    Existing Precautions/Restrictions fall  -       Row Name 08/09/23 1325          Safety Issues, Functional Mobility    Impairments Affecting Function (Mobility) balance;endurance/activity tolerance;strength  -PH     Comment, Safety Issues/Impairments (Mobility) gt belt and non skid sneakers; possible OH w/ pt report of lightheadedness  -PH               User Key  (r) = Recorded By, (t) = Taken By, (c) = Cosigned By      Initials Name Provider Type    PH Huma Graves PTA Physical Therapist Assistant                   Mobility       Row Name 08/09/23 1326          Bed Mobility    Bed  Mobility supine-sit  -PH     Supine-Sit Ong (Bed Mobility) modified independence  -PH     Supine-Sit-Supine Ong (Bed Mobility) modified independence  -PH     Assistive Device (Bed Mobility) bed rails;head of bed elevated  -PH       Row Name 08/09/23 1326          Sit-Stand Transfer    Sit-Stand Ong (Transfers) contact guard;verbal cues;nonverbal cues (demo/gesture)  -PH     Assistive Device (Sit-Stand Transfers) walker, front-wheeled  -PH       Row Name 08/09/23 1326          Gait/Stairs (Locomotion)    Ong Level (Gait) contact guard;verbal cues  -PH     Assistive Device (Gait) walker, front-wheeled  -PH     Distance in Feet (Gait) 45'  -PH     Deviations/Abnormal Patterns (Gait) base of support, narrow;jf decreased;gait speed decreased  -PH     Bilateral Gait Deviations forward flexed posture;heel strike decreased  -PH     Ong Level (Stairs) unable to assess  -PH     Comment, (Gait/Stairs) cues for upright posture and to widen NOEL; B feet IR today; pt reported lightheadedness when they stepped outside the room and was returned to EOB; BP taken by aide was 103/56  -PH               User Key  (r) = Recorded By, (t) = Taken By, (c) = Cosigned By      Initials Name Provider Type    Huma Schaffer PTA Physical Therapist Assistant                   Obj/Interventions       Row Name 08/09/23 1330          Balance    Balance Assessment sitting static balance  -PH     Static Sitting Balance modified independence  -PH     Dynamic Sitting Balance standby assist  -PH     Comment, Balance pt donned sneakers at EOB w/ extra time req SBA  -PH               User Key  (r) = Recorded By, (t) = Taken By, (c) = Cosigned By      Initials Name Provider Type    PH Huma Graves PTA Physical Therapist Assistant                   Goals/Plan    No documentation.                  Clinical Impression       Row Name 08/09/23 1331          Pain    Pretreatment Pain Rating  0/10 - no pain  -PH     Posttreatment Pain Rating 0/10 - no pain  -PH     Additional Documentation Pain Scale: Numbers Pre/Post-Treatment (Group)  -PH       Row Name 08/09/23 1331          Plan of Care Review    Plan of Care Reviewed With patient  -PH     Progress improving  -PH     Outcome Evaluation Pt seen by PT this date for tx. Pt reported lightheadedness after amb to just outside of room and was returned to EOB; pt req CGA and use of fww for gait w/ no overt LOB. Pt's BP taken was 103/56 when seated EOB w/ lightheadedness continuing by pt report. Pt returned to bed w/ mod I. Rec HH PT to address deficits after dc.  -PH       Row Name 08/09/23 1331          Vital Signs    O2 Delivery Pre Treatment room air  -PH     O2 Delivery Intra Treatment room air  -PH     O2 Delivery Post Treatment room air  -PH       Row Name 08/09/23 1331          Positioning and Restraints    Pre-Treatment Position in bed  -PH     Post Treatment Position bed  -PH     In Bed fowlers;call light within reach;encouraged to call for assist;exit alarm on;with family/caregiver;notified [facility]  -               User Key  (r) = Recorded By, (t) = Taken By, (c) = Cosigned By      Initials Name Provider Type    PH Huma Graves PTA Physical Therapist Assistant                   Outcome Measures       Row Name 08/09/23 1340 08/09/23 1231       How much help from another person do you currently need...    Turning from your back to your side while in flat bed without using bedrails? 4  -PH 4  -KE    Moving from lying on back to sitting on the side of a flat bed without bedrails? 4  -PH 4  -KE    Moving to and from a bed to a chair (including a wheelchair)? 3  -PH 3  -KE    Standing up from a chair using your arms (e.g., wheelchair, bedside chair)? 3  -PH 3  -KE    Climbing 3-5 steps with a railing? 2  -PH 2  -KE    To walk in hospital room? 3  -PH 3  -KE    AM-PAC 6 Clicks Score (PT) 19  -PH 19  -KE    Highest level of mobility 6 --> Walked  10 steps or more  - 6 --> Walked 10 steps or more  -DARRYL              User Key  (r) = Recorded By, (t) = Taken By, (c) = Cosigned By      Initials Name Provider Type     Huma Graves PTA Physical Therapist Assistant    Alis Van, RN Registered Nurse                                 Physical Therapy Education       Title: PT OT SLP Therapies (Done)       Topic: Physical Therapy (Done)       Point: Mobility training (Done)       Learning Progress Summary             Patient Acceptance, E,TB, VU,NR by  at 8/9/2023 1342    Acceptance, E,TB,D, VU,NR by  at 8/8/2023 1437    Eager, E,TB,D, VU,DU by  at 8/7/2023 1725    Acceptance, E, VU,NR by CN at 8/6/2023 1433   Family Eager, E,TB,D, VU,DU by  at 8/7/2023 1725                         Point: Home exercise program (Done)       Learning Progress Summary             Patient Acceptance, E,TB,D, VU,NR by  at 8/8/2023 1437    Eager, E,TB,D, VU,DU by  at 8/7/2023 1725    Acceptance, E, VU,NR by CN at 8/6/2023 1433   Family Eager, E,TB,D, VU,DU by  at 8/7/2023 1725                         Point: Body mechanics (Done)       Learning Progress Summary             Patient Acceptance, E,TB, VU,NR by  at 8/9/2023 1342    Acceptance, E,TB,D, VU,NR by  at 8/8/2023 1437    Eager, E,TB,D, VU,DU by  at 8/7/2023 1725    Acceptance, E, VU,NR by CN at 8/6/2023 1433   Family Eager, E,TB,D, VU,DU by  at 8/7/2023 1725                         Point: Precautions (Done)       Learning Progress Summary             Patient Acceptance, E,TB, VU,NR by  at 8/9/2023 1342    Acceptance, E,TB,D, VU,NR by  at 8/8/2023 1437    Eager, E,TB,D, VU,DU by  at 8/7/2023 1725    Acceptance, E, VU,NR by MIKE at 8/6/2023 1433   Family Eager, CONCEPCIÓN,TB,D, ZAN,DU by EUGENE at 8/7/2023 1725                                         User Key       Initials Effective Dates Name Provider Type Discipline    EUGENE 03/07/18 -  Astrid Barba PTA Physical Therapist Assistant PT    MIKE 06/16/21  -  Naheed Hendrickson, PT Physical Therapist PT    PH 06/16/21 -  Huma Graves PTA Physical Therapist Assistant PT                  PT Recommendation and Plan     Plan of Care Reviewed With: patient  Progress: improving  Outcome Evaluation: Pt seen by PT this date for tx. Pt reported lightheadedness after amb to just outside of room and was returned to EOB; pt req CGA and use of fww for gait w/ no overt LOB. Pt's BP taken was 103/56 when seated EOB w/ lightheadedness continuing by pt report. Pt returned to bed w/ mod I. Rec HH PT to address deficits after dc.     Time Calculation:         PT Charges       Row Name 08/09/23 1342             Time Calculation    Start Time 1256  -PH      Stop Time 1308  -PH      Time Calculation (min) 12 min  -PH      PT Received On 08/09/23  -PH      PT - Next Appointment 08/10/23  -PH         Timed Charges    03581 - PT Therapeutic Activity Minutes 12  -PH         Total Minutes    Timed Charges Total Minutes 12  -PH       Total Minutes 12  -PH                User Key  (r) = Recorded By, (t) = Taken By, (c) = Cosigned By      Initials Name Provider Type    PH Huma Graves PTA Physical Therapist Assistant                  Therapy Charges for Today       Code Description Service Date Service Provider Modifiers Qty    32001420665 HC PT THERAPEUTIC ACT EA 15 MIN 8/8/2023 Huma Graves PTA GP 1    15325293846 HC PT THERAPEUTIC ACT EA 15 MIN 8/9/2023 Huma Graves PTA GP 1            PT G-Codes  Outcome Measure Options: AM-PAC 6 Clicks Basic Mobility (PT)  AM-PAC 6 Clicks Score (PT): 19  PT Discharge Summary  Anticipated Discharge Disposition (PT): home with home health, home with assist    Huma Graves PTA  8/9/2023

## 2023-08-09 NOTE — PLAN OF CARE
Goal Outcome Evaluation:         A/O x4, room air to 2L NC, sinus gregorio on monitor. No complaints of pain. Skin care provided. Gastric emptying study completed. Reglan started. Possible discharge tomorrow if pt tolerates diet.

## 2023-08-09 NOTE — PLAN OF CARE
Goal Outcome Evaluation:  Plan of Care Reviewed With: patient        Progress: improving  Outcome Evaluation: Pt seen by PT this date for tx. Pt reported lightheadedness after amb to just outside of room and was returned to EOB; pt req CGA and use of fww for gait w/ no overt LOB. Pt's BP taken was 103/56 when seated EOB w/ lightheadedness continuing by pt report. Pt returned to bed w/ mod I. Rec  PT to address deficits after dc.      Anticipated Discharge Disposition (PT): home with home health, home with assist

## 2023-08-09 NOTE — PROGRESS NOTES
[facility]  Inpatient Progress Note    Reason for Follow Up: Nausea, vomiting, dysphagia and regurgitation    Subjective     Interval History:   No evidence of malignancy at anastomosis, no stricture, dysphagia likely due to esophageal dysmotility  Retained food on EGD  Symptoms may be due to gastric emptying issues  Gastric emptying study pending    Current Facility-Administered Medications:     acetaminophen (TYLENOL) tablet 650 mg, 650 mg, Oral, Q4H PRN **OR** acetaminophen (TYLENOL) 160 MG/5ML solution 650 mg, 650 mg, Oral, Q4H PRN **OR** acetaminophen (TYLENOL) suppository 650 mg, 650 mg, Rectal, Q4H PRN, Jameel Valencia MD    albuterol (PROVENTIL) nebulizer solution 0.083% 2.5 mg/3mL, 2.5 mg, Nebulization, Q6H PRN, Jameel Valencia MD    apixaban (ELIQUIS) tablet 5 mg, 5 mg, Oral, Q12H, Jameel Valencia MD, 5 mg at 08/09/23 1226    atorvastatin (LIPITOR) tablet 40 mg, 40 mg, Oral, Nightly, Jameel Valencia MD, 40 mg at 08/08/23 2139    sennosides-docusate (PERICOLACE) 8.6-50 MG per tablet 2 tablet, 2 tablet, Oral, BID, 2 tablet at 08/08/23 1053 **AND** polyethylene glycol (MIRALAX) packet 17 g, 17 g, Oral, Daily PRN **AND** bisacodyl (DULCOLAX) EC tablet 5 mg, 5 mg, Oral, Daily PRN **AND** bisacodyl (DULCOLAX) suppository 10 mg, 10 mg, Rectal, Daily PRN, Jameel Valencia MD    bisacodyl (DULCOLAX) EC tablet 5 mg, 5 mg, Oral, Daily, Jameel Valencia MD, 5 mg at 08/08/23 1046    Calcium Replacement - Follow Nurse / BPA Driven Protocol, , Does not apply, PRN, Jameel Valencia MD    castor oil-balsam peru (VENELEX) ointment 1 application , 1 application , Topical, Q12H, Jameel Valencia MD, 1 application  at 08/09/23 1225    furosemide (LASIX) tablet 40 mg, 40 mg, Oral, BID, Jameel Valencia MD, 40 mg at 08/08/23 1721    lactated ringers infusion, 30 mL/hr, Intravenous, Continuous, Jameel Valencia MD    Magnesium Standard Dose Replacement - Follow Nurse / BPA Driven Protocol, , Does not  apply, PRN, Jameel Valencia MD    metoprolol tartrate (LOPRESSOR) tablet 50 mg, 50 mg, Oral, BID, Jameel Valencia MD, 50 mg at 08/08/23 2139    ondansetron (ZOFRAN) tablet 4 mg, 4 mg, Oral, Q6H PRN **OR** ondansetron (ZOFRAN) injection 4 mg, 4 mg, Intravenous, Q6H PRN, Jameel Valencia MD    pantoprazole (PROTONIX) EC tablet 40 mg, 40 mg, Oral, Q AM, Jameel Valencia MD, 40 mg at 08/07/23 0635    PARoxetine (PAXIL) tablet 40 mg, 40 mg, Oral, QAM, Jameel Valencia MD, 40 mg at 08/07/23 0635    Phosphorus Replacement - Follow Nurse / BPA Driven Protocol, , Does not apply, PRN, Jameel Valencia MD    polyethylene glycol (MIRALAX) packet 17 g, 17 g, Oral, Daily, Jameel Valencia MD, 17 g at 08/08/23 1051    potassium chloride (K-DUR,KLOR-CON) ER tablet 10 mEq, 10 mEq, Oral, Daily, Jameel Valencia MD, 10 mEq at 08/09/23 1225    Potassium Replacement - Follow Nurse / BPA Driven Protocol, , Does not apply, PRAnnie YO Brian M, MD    pramipexole (MIRAPEX) tablet 1.5 mg, 1.5 mg, Oral, BID, Jameel Valencia MD, 1.5 mg at 08/09/23 1226    [COMPLETED] Insert Peripheral IV, , , Once **AND** sodium chloride 0.9 % flush 10 mL, 10 mL, Intravenous, PRNAnnie Brian M, MD    sodium chloride 0.9 % flush 10 mL, 10 mL, Intravenous, Q12H, Jameel Valencia MD, 10 mL at 08/09/23 1231    sodium chloride 0.9 % flush 10 mL, 10 mL, Intravenous, PRNAnnie Brian M, MD    sodium chloride 0.9 % infusion 40 mL, 40 mL, Intravenous, PRN, Jameel Valencia MD    sodium chloride 0.9 % infusion, 30 mL/hr, Intravenous, Continuous PRN, Jameel Valencia MD, Stopped at 08/08/23 1012    tamsulosin (FLOMAX) 24 hr capsule 0.4 mg, 0.4 mg, Oral, Daily, Jameel Valencia MD, 0.4 mg at 08/09/23 1226    tiotropium (SPIRIVA RESPIMAT) 2.5 mcg/act aerosol solution inhaler, 2 puff, Inhalation, Daily, Jameel Valencia MD, 2 puff at 08/08/23 0822  Review of Systems:    Is weakness and fatigue all other systems reviewed and negative    Objective     Vital  Signs  Temp:  [97.3 øF (36.3 øC)-97.9 øF (36.6 øC)] 97.9 øF (36.6 øC)  Heart Rate:  [53-58] 56  Resp:  [16-20] 20  BP: (100-107)/(50-61) 100/56  Body mass index is 20.16 kg/mý.    Intake/Output Summary (Last 24 hours) at 8/9/2023 1250  Last data filed at 8/9/2023 0700  Gross per 24 hour   Intake 1400 ml   Output 2550 ml   Net -1150 ml     No intake/output data recorded.     Physical Exam:   General: patient awake, alert and cooperative   Eyes: Normal lids and lashes, no scleral icterus   Neck: supple, normal ROM   Skin: warm and dry, not jaundiced   Cardiovascular: regular rhythm and rate, no murmurs auscultated   Pulm: clear to auscultation bilaterally, regular and unlabored   Abdomen: soft, nontender, nondistended; normal bowel sounds   Extremities: no rash or edema   Psychiatric: Normal mood and behavior; memory intact     Results Review:     I reviewed the patient's new clinical results.    Results from last 7 days   Lab Units 08/09/23  0604 08/08/23  0625 08/07/23  0617   WBC 10*3/mm3 3.58 3.47 3.61   HEMOGLOBIN g/dL 10.7* 10.4* 10.2*   HEMATOCRIT % 32.9* 31.5* 30.7*   PLATELETS 10*3/mm3 169 160 148     Results from last 7 days   Lab Units 08/09/23  0604 08/08/23  0625 08/07/23  0617 08/06/23  0734 08/05/23  1427   SODIUM mmol/L 134* 137 139   < > 135*   POTASSIUM mmol/L 3.8 4.4 3.9   < > 4.7   CHLORIDE mmol/L 98 101 103   < > 105   CO2 mmol/L 29.2* 31.0* 29.2*   < > 24.0   BUN mg/dL 12 12 14   < > 20   CREATININE mg/dL 1.08 1.04 1.11   < > 1.11   CALCIUM mg/dL 8.5* 8.4* 8.1*   < > 8.7   BILIRUBIN mg/dL  --  0.3  --   --  0.2   ALK PHOS U/L  --  78  --   --  80   ALT (SGPT) U/L  --  9  --   --  10   AST (SGOT) U/L  --  15  --   --  14   GLUCOSE mg/dL 84 84 94   < > 109*    < > = values in this interval not displayed.     Results from last 7 days   Lab Units 08/08/23  0625   INR  1.36*     Lab Results   Lab Value Date/Time    LIPASE 25 07/29/2023 1137    LIPASE 18 08/30/2016 2057       Radiology:  XR Chest 1  View   Final Result   Stable exam       This report was finalized on 8/5/2023 2:40 PM by Dr. Miguel Hogan M.D.          NM Gastric Emptying    (Results Pending)       Assessment & Plan     Active Hospital Problems    Diagnosis     **Nausea     Weight loss     Other symptoms and signs concerning food and fluid intake     Dysphagia     Severe malnutrition     Acute urinary retention     Nausea and vomiting     Chronic obstructive pulmonary disease     Dysphagia     CKD (chronic kidney disease) stage 2, GFR 60-89 ml/min     Chronic anticoagulation     History of recurrent deep vein thrombosis (DVT)     Hypertension     History of esophageal cancer        Assessment:  History of esophageal cancer with partial esophagectomy  Dysphagia likely due to dysmotility  Nausea vomiting, retained food on EGD, questionable gastroparesis      Plan:  Await gastric emptying study results  Await biopsies from EGD-inflammation and anastomosis but highly doubt recurrence of cancer    I discussed the patients findings and my recommendations with patient and nursing staff.    Jameel Valencia MD      Gastric emptying study showed significant delayed  Start a gastroparetic/low residue diet  Begin Reglan IV 5 mg  If tolerates dinner and breakfast tomorrow okay for discharge home on Reglan 5 mg p.o. 3 times daily with follow-up in our office in 2 to 3 weeks with a nurse practitioner

## 2023-08-09 NOTE — PLAN OF CARE
Problem: Adult Inpatient Plan of Care  Goal: Plan of Care Review  Outcome: Ongoing, Progressing  Flowsheets (Taken 8/9/2023 6704)  Progress: improving  Plan of Care Reviewed With: patient  Outcome Evaluation: Patient slept well overnight. A&Ox4. VSS. Ambulated to BRP. Multiple BMs. King in place. Mepilex in place. NPO for gastric emptying study. All needs met.

## 2023-08-09 NOTE — PROGRESS NOTES
Name: Jose Hurtado ADMIT: 2023   : 1950  PCP: Brandin Bolton MD    MRN: [id number] LOS: 0 days   AGE/SEX: 73 y.o. male  ROOM: [facility]     Subjective   Subjective   Feeling good, eager to discharge. Ate some oreos after GES without issue.    Objective   Objective   Vital Signs  Temp:  [97.3 øF (36.3 øC)-97.9 øF (36.6 øC)] 97.9 øF (36.6 øC)  Heart Rate:  [53-70] 58  Resp:  [16-25] 20  BP: ()/(50-61) 101/59  SpO2:  [97 %-100 %] 100 %  on   ;   Device (Oxygen Therapy): room air  Body mass index is 20.16 kg/mý.  Physical Exam  Constitutional:       General: He is not in acute distress.     Appearance: He is ill-appearing.   Eyes:      Extraocular Movements: Extraocular movements intact.      Pupils: Pupils are equal, round, and reactive to light.   Cardiovascular:      Rate and Rhythm: Normal rate.   Pulmonary:      Effort: Pulmonary effort is normal.      Breath sounds: No stridor.   Abdominal:      General: There is no distension.      Tenderness: There is no abdominal tenderness. There is no guarding or rebound.      Hernia: No hernia is present.   Skin:     Coloration: Skin is not jaundiced.   Neurological:      General: No focal deficit present.      Mental Status: He is alert and oriented to person, place, and time.   Psychiatric:         Mood and Affect: Mood normal.         Behavior: Behavior normal.       Results Review     I reviewed the patient's new clinical results.  Results from last 7 days   Lab Units 23  0604 23  0625 23  0617 23  0734   WBC 10*3/mm3 3.58 3.47 3.61 3.23*   HEMOGLOBIN g/dL 10.7* 10.4* 10.2* 10.2*   PLATELETS 10*3/mm3 169 160 148 149       Results from last 7 days   Lab Units 23  0604 23  0625 23  0617 23  0734   SODIUM mmol/L 134* 137 139 137   POTASSIUM mmol/L 3.8 4.4 3.9 4.4   CHLORIDE mmol/L 98 101 103 105   CO2 mmol/L 29.2* 31.0* 29.2* 27.0   BUN mg/dL 12 12 14 18   CREATININE mg/dL 1.08 1.04 1.11 0.96   GLUCOSE mg/dL  84 84 94 91   EGFR mL/min/1.73 71.6 74.9 69.2 82.4       Results from last 7 days   Lab Units 08/08/23  0625 08/05/23  1427   ALBUMIN g/dL 2.9* 3.3*   BILIRUBIN mg/dL 0.3 0.2   ALK PHOS U/L 78 80   AST (SGOT) U/L 15 14   ALT (SGPT) U/L 9 10       Results from last 7 days   Lab Units 08/09/23  0604 08/08/23  1754 08/08/23  0625 08/07/23  0617 08/06/23 2021 08/06/23  0734 08/05/23  1427   CALCIUM mg/dL 8.5*  --  8.4* 8.1*  --  8.6 8.7   ALBUMIN g/dL  --   --  2.9*  --   --   --  3.3*   MAGNESIUM mg/dL 2.0  --  1.8 1.9  --  2.2 2.1   PHOSPHORUS mg/dL 2.4* 2.9 2.1* 2.9   < > 2.1* 2.3*    < > = values in this interval not displayed.         No results found for: HGBA1C, POCGLU    No radiology results for the last day  Scheduled Medications  apixaban, 5 mg, Oral, Q12H  atorvastatin, 40 mg, Oral, Nightly  bisacodyl, 5 mg, Oral, Daily  castor oil-balsam peru, 1 application , Topical, Q12H  furosemide, 40 mg, Oral, BID  metoprolol tartrate, 50 mg, Oral, BID  pantoprazole, 40 mg, Oral, Q AM  PARoxetine, 40 mg, Oral, QAM  polyethylene glycol, 17 g, Oral, Daily  potassium chloride, 10 mEq, Oral, Daily  pramipexole, 1.5 mg, Oral, BID  senna-docusate sodium, 2 tablet, Oral, BID  sodium chloride, 10 mL, Intravenous, Q12H  tamsulosin, 0.4 mg, Oral, Daily  tiotropium bromide monohydrate, 2 puff, Inhalation, Daily    Infusions  lactated ringers, 30 mL/hr  sodium chloride, 30 mL/hr, Last Rate: Stopped (08/08/23 1012)    Diet  NPO Diet NPO Type: Strict NPO       Assessment/Plan     Active Hospital Problems    Diagnosis  POA    **Nausea [R11.0]  Yes    Weight loss [R63.4]  Unknown    Other symptoms and signs concerning food and fluid intake [R63.8]  Yes    Dysphagia [R13.10]  Yes    Severe malnutrition [E43]  Yes    Acute urinary retention [R33.8]  Yes    Nausea and vomiting [R11.2]  Unknown    Chronic obstructive pulmonary disease [J44.9]  Yes    Dysphagia [R13.10]  Unknown    CKD (chronic kidney disease) stage 2, GFR 60-89 ml/min  [N18.2]  Yes    Chronic anticoagulation [Z79.01]  Not Applicable    History of recurrent deep vein thrombosis (DVT) [Z86.718]  Not Applicable    Hypertension [I10]  Yes    History of esophageal cancer [Z85.01]  Yes      Resolved Hospital Problems   No resolved problems to display.       73 y.o. male admitted with Nausea.      08/09/23  Trial of Reglan for gastroparesis. If he tolerates this he can dc tmrw morning.    N/V  Severe malnutrition  Dysphagia  Esophageal CA (s/p esophagectomy)  Gastroparesis  -SLP, Nutrition consults  -GI following  -EGD aborted 8/7/2023 due to inadequate prep; repeat on 8/8 with large amount of food in the stomach  -gastric emptying study on 8/8/2023 w/ delayed transit    Intraabdominal infection  -From recent hospital admission, seen on CT w/ colitis/proctitis  -Flex sig on 8/2 with 2 small ulcers in rectum and rectosigmoid colon, nonbleeding internal hemorrhoids  -Continue cefdinir and Flagyl to complete 7 days    Acute urinary retention  -Failed voiding trial prior to discharge, King catheter in place  -OP follow-up with urology    COPD, not in exacerbation  -Symbicort, Spiriva  -DuoNebs prn     HLD  -atorvastatin 40mg qhs     Afib w/ RVR  -RC: metoprolol 50mg BID  -AC: apixaban     Hx DVT  APL syndrome  -apixaban as above     Depression  -Paxil 40mg qam    Chronic myocardial injury  -trop 59 > 61; no further ischemic eval indicated at this time      Eliquis (home med) for DVT prophylaxis.  Discussed with patient, family, and Dr Valencia .  Anticipate discharge home in 1-2 days      Carloz Ortiz MD  [facility]  08/09/23  08:08 EDT

## 2023-08-09 NOTE — PROGRESS NOTES
Nutrition Services    Patient Name:  Jose Hurtado  YOB: 1950  MRN: [id number]  Admit Date:  8/5/2023  Assessment Date:  08/09/23    Comment: Follow up/MSA:   EGD 8/8- esophago-gastric anastomosis with inflammation, bx and recommended GES. NPO today for GES, results pending.   Diet just now advanced to Fiber-restricted diet at lunchtime today after GES.  Dysphagia likely due to dysmotility per GI MD notes.    Severe Malnutrition (Based on ASPEN/AND, pt meets nutrition diagnosis of Severe Malnutrition of Chronic Disease due to inadequate po intake, 40 lb (19%) wt loss x 6 months and moderate to severe fat/muscle wasting noted on NFPE.)    Recommend:    Boost Plus TID ordered.    RD will continue to follow course for PO intake and tolerance.     CLINICAL NUTRITION ASSESSMENT      Reason for Assessment Chronic Poor Intake , Follow-up Protocol, Malnutrition Severity Assessment (MSA)     Diagnosis/Problem   Nausea    Medical/Surgical History Past Medical History:   Diagnosis Date    Acute deep vein thrombosis (DVT) of popliteal vein of left lower extremity 03/24/2020    Anemia     Anti-phospholipid syndrome     On lifelong anticoagulation therapy    Bladder disorder     LEAKAGE   ON MED  wers pads    Bleeding hemorrhoids     Chronic kidney disease     Community acquired pneumonia     HISTORY OF IN 2014    Congestive heart failure     COPD (chronic obstructive pulmonary disease)     Deep venous thrombosis 2006, 2008    Left lower extremity multiple    Depression     Esophageal carcinoma 12/31/2014    had chemo and radiation prior surgery    Hemorrhoids     HH (hiatus hernia)     History of atrial fibrillation 2015    ONE EPISODE WHILE HOSPITALIZED    History of kidney stones     History of nephrolithiasis     History of pancreatitis     PT STATES MANY YEARS AGO    History of radiation therapy     History of transfusion     Hypertension     Long-term (current) use of anticoagulants, INR goal 2.0-3.0      Lymphedema     Malignant neoplasm of prostate     Other hyperlipidemia 01/30/2018 January 30, 2018 lipid panel risk 12.8%    Restless legs syndrome     Sleep apnea     OCCASIONALLY WEARS CPAP    Squamous carcinoma     on the head       Past Surgical History:   Procedure Laterality Date    APPENDECTOMY  1950    BRONCHOSCOPY      (Diagnostic)    CARDIAC CATHETERIZATION N/A 5/14/2022    Procedure: Left Heart Cath;  Surgeon: Eric So MD;  Location: [facility] CATH INVASIVE LOCATION;  Service: Cardiology;  Laterality: N/A;    CARDIAC CATHETERIZATION N/A 5/14/2022    Procedure: Coronary angiography;  Surgeon: Eric So MD;  Location: [facility] CATH INVASIVE LOCATION;  Service: Cardiology;  Laterality: N/A;    CARDIAC CATHETERIZATION N/A 5/14/2022    Procedure: Left ventriculography;  Surgeon: Eric So MD;  Location: [facility] CATH INVASIVE LOCATION;  Service: Cardiology;  Laterality: N/A;    CATARACT EXTRACTION Bilateral 2014    COLONOSCOPY  12/15/2014    Complete / Description: EH, IH, torts, stool, follow-up colonoscopy due in 5 years.    COLONOSCOPY N/A 6/13/2017    non-thrombosed external hemorrhoids, normal examined ileum, IH    COLONOSCOPY N/A 2/26/2019    Procedure: COLONOSCOPY with Cold Polypectomy;  Surgeon: Mulugeta Millan MD;  Location: [facility] ENDOSCOPY;  Service: Gastroenterology    COLONOSCOPY N/A 12/16/2020    Procedure: COLONOSCOPY to cecum into TI;  Surgeon: Mesha Sanchez MD;  Location: [facility] ENDOSCOPY;  Service: Gastroenterology;  Laterality: N/A;  pre: lower GI bleed  post: hemorrhoids     CYSTOSCOPY W/ LASER LITHOTRIPSY      ENDOSCOPY N/A 6/13/2017    Procedure: ESOPHAGOGASTRODUODENOSCOPY WITH COLD BIOPSY;  Surgeon: Lake Gonzalez MD;  Location: [facility] ENDOSCOPY;  Service:     ENDOSCOPY N/A 11/18/2021    Procedure: ESOPHAGOGASTRODUODENOSCOPY WITH  DILATATION WITH FLUOROSCOPY;  Surgeon: Jose Christine III, MD;  Location: [facility] ENDOSCOPY;  Service:  Gastroenterology;  Laterality: N/A;  Pre: dysphagia, h/x of adinocarcinoma of esophagus  Post: same    ENDOSCOPY N/A 8/7/2023    Procedure: ESOPHAGOGASTRODUODENOSCOPY;  Surgeon: Jameel Valencia MD;  Location: [facility] ENDOSCOPY;  Service: Gastroenterology;  Laterality: N/A;  PRE- DYSPHAGIA  POST-ABORTED DUE TO RETAINED FOOD    ENDOSCOPY N/A 8/8/2023    Procedure: ESOPHAGOGASTRODUODENOSCOPY with bx;  Surgeon: Jameel Valencia MD;  Location: [facility] ENDOSCOPY;  Service: Gastroenterology;  Laterality: N/A;  pre: N/V, abd pain  post: esophageal nodule, retained food    ESOPHAGECTOMY      April 2015, stage IIB esophageal carcinoma, sub-total resection.    ESOPHAGECTOMY      Esophagectomy Subtotal Hubbard Joe Procedure    EXCISION LESION  08/2012    Removal of Squamous Cell CA on Head    HAMMER TOE REPAIR  09/2014    Hammertoe Operation (Each Toe), 10/2014    HAMMER TOE REPAIR Left 10/3/2017    Procedure: Left second third and fourth distal interphalangeal joint resection with flexor tenotomy;  Surgeon: Mulugeta Lira MD;  Location: [facility] OR OSC;  Service:     HEMORRHOIDECTOMY N/A 12/23/2020    Procedure: HEMORRHOIDECTOMY;  Surgeon: Billy Julio Jr., MD;  Location: [facility] MAIN OR;  Service: General;  Laterality: N/A;    HERNIA REPAIR      incisional    JEJUNOSTOMY      Laparoscopic    JEJUNOSTOMY      tube removal     KNEE SURGERY Bilateral 1967, 1973, 1981    PATELLA SURGERY Left     removed    PILONIDAL CYST / SINUS EXCISION      DC ARTHRP KNE CONDYLE&PLATU MEDIAL&LAT COMPARTMENTS Right 3/26/2018    Procedure: TOTAL KNEE ARTHROPLASTY;  Surgeon: Renny Solis MD;  Location: [facility] MAIN OR;  Service: Orthopedics    PROSTATECTOMY  2010    PYLOROPLASTY      SIGMOIDOSCOPY N/A 8/2/2023    Procedure: SIGMOIDOSCOPY FLEXIBLE;  Surgeon: Mesha Sanchez MD;  Location: [facility] ENDOSCOPY;  Service: Gastroenterology;  Laterality: N/A;  PRE- ABNORMAL CT  POST- HEMORRHOIDS, ULCERATION    SPINAL FUSION  02/1998    C 5,6     "TONSILLECTOMY      UPPER GASTROINTESTINAL ENDOSCOPY  12/15/2014    LA Grade D esophagitis, Pardo's, HH, multiple duodenal ulcers    VENTRAL/INCISIONAL HERNIA REPAIR N/A 4/14/2016    Procedure: VENTRAL/INCISIONAL HERNIA REPAIR, open, with mesh, and component separation;  Surgeon: Darren Rivas MD;  Location: [facility];  Service:         Encounter Information        Nutrition History:     Food Preferences:    Supplements:    Factors Affecting Intake: decreased appetite, nausea     Anthropometrics        Current Height  Current Weight  BMI kg/m2 Height: 195.6 cm (77\")  Weight: 77.1 kg (170 lb) (08/05/23 1514)  Body mass index is 20.16 kg/mý.   Adjusted BMI (if applicable)    BMI Category Normal/Healthy (18.4 - 24.9)       Admission Weight 170 lb (77 kg)       Ideal Body Weight (IBW) 208 lb (94.5 kg)   Adjusted IBW (if applicable)        Usual Body Weight (UBW) 210 lb (2/2023)   Weight Change/Trend Loss, Amount/Timeframe: 40 lb (19%) weight loss past 6 months per weight history       Weight History Wt Readings from Last 30 Encounters:   08/05/23 1514 77.1 kg (170 lb)   08/05/23 1300 81.6 kg (180 lb)   08/02/23 1439 86.2 kg (190 lb)   07/30/23 1145 78.4 kg (172 lb 13.5 oz)   07/29/23 0941 81.6 kg (180 lb)   07/27/23 1015 81.6 kg (180 lb)   06/23/23 1405 85.5 kg (188 lb 6.4 oz)   05/16/23 1253 84.1 kg (185 lb 6.4 oz)   05/12/23 0734 90.7 kg (200 lb)   04/04/23 1323 93 kg (205 lb)   03/22/23 1254 93.4 kg (205 lb 12.8 oz)   02/17/23 1117 96.1 kg (211 lb 12.8 oz)   02/15/23 1044 93.5 kg (206 lb 1.6 oz)   02/10/23 1351 91.2 kg (201 lb)   01/20/23 1058 99.8 kg (220 lb)   01/03/23 0629 95.4 kg (210 lb 6.4 oz)   01/02/23 2331 97.5 kg (215 lb)   12/28/22 1123 104 kg (230 lb 3.2 oz)   12/21/22 1134 102 kg (224 lb)   12/16/22 1041 103 kg (227 lb)   12/05/22 1123 103 kg (227 lb)   11/22/22 1430 99 kg (218 lb 3.2 oz)   10/10/22 1001 107 kg (235 lb)   09/21/22 1126 105 kg (232 lb)   08/30/22 1500 97.1 kg (214 lb) "   07/15/22 1035 96.2 kg (212 lb)   05/31/22 1615 94.2 kg (207 lb 11.2 oz)   05/16/22 0952 92.5 kg (204 lb)   05/14/22 0435 93.2 kg (205 lb 6.4 oz)   05/13/22 1147 95.3 kg (210 lb)   05/13/22 0605 96 kg (211 lb 9.6 oz)   05/12/22 2055 96.8 kg (213 lb 4.8 oz)   05/12/22 2002 93.3 kg (205 lb 9.6 oz)   03/30/22 1341 95.3 kg (210 lb)   03/23/22 1243 101 kg (223 lb 3.2 oz)   03/22/22 0933 100 kg (220 lb 14.4 oz)   03/16/22 0857 99.2 kg (218 lb 12.8 oz)           --  Estimated/Assessed Needs        Current Weight  Weight: 77.1 kg (170 lb) (08/05/23 1514)       Energy Requirements    Weight for Calculation 77 kg   Method for Estimation  30 kcal/kg   EST Needs (kcal/day) 2310 kcals       Protein Requirements    Weight for Calculation 77 kg   EST Protein Needs (g/kg) 1.2 - 1.5 gm/kg   EST Daily Needs (g/day)  g       Fluid Requirements     Method for Estimation 30 mL/kg    EST Needs (mL/day) 2310 ml     Tests/Procedures        Tests/Procedures EGD, Other: GES today     Labs       Pertinent Labs    Results from last 7 days   Lab Units 08/09/23  0604 08/08/23  0625 08/07/23  0617 08/06/23  0734 08/05/23  1427   SODIUM mmol/L 134* 137 139   < > 135*   POTASSIUM mmol/L 3.8 4.4 3.9   < > 4.7   CHLORIDE mmol/L 98 101 103   < > 105   CO2 mmol/L 29.2* 31.0* 29.2*   < > 24.0   BUN mg/dL 12 12 14   < > 20   CREATININE mg/dL 1.08 1.04 1.11   < > 1.11   CALCIUM mg/dL 8.5* 8.4* 8.1*   < > 8.7   BILIRUBIN mg/dL  --  0.3  --   --  0.2   ALK PHOS U/L  --  78  --   --  80   ALT (SGPT) U/L  --  9  --   --  10   AST (SGOT) U/L  --  15  --   --  14   GLUCOSE mg/dL 84 84 94   < > 109*    < > = values in this interval not displayed.       Results from last 7 days   Lab Units 08/09/23  0604 08/08/23  1754 08/08/23  0625 08/07/23  0617   MAGNESIUM mg/dL 2.0  --  1.8 1.9   PHOSPHORUS mg/dL 2.4*   < > 2.1* 2.9   HEMOGLOBIN g/dL 10.7*  --  10.4* 10.2*   HEMATOCRIT % 32.9*  --  31.5* 30.7*   WBC 10*3/mm3 3.58  --  3.47 3.61   ALBUMIN g/dL  --   --   2.9*  --     < > = values in this interval not displayed.       Results from last 7 days   Lab Units 08/09/23  0604 08/08/23  0625 08/07/23  0617 08/06/23  0734 08/05/23  1427   INR   --  1.36*  --   --   --    PLATELETS 10*3/mm3 169 160 148 149 146       COVID19   Date Value Ref Range Status   01/03/2023 Not Detected Not Detected - Ref. Range Final     Lab Results   Component Value Date    HGBA1C 5.50 08/06/2021          Medications           Scheduled Medications apixaban, 5 mg, Oral, Q12H  atorvastatin, 40 mg, Oral, Nightly  bisacodyl, 5 mg, Oral, Daily  castor oil-balsam peru, 1 application , Topical, Q12H  furosemide, 40 mg, Oral, BID  metoclopramide, 5 mg, Intravenous, Q6H  metoprolol tartrate, 50 mg, Oral, BID  pantoprazole, 40 mg, Oral, Q AM  PARoxetine, 40 mg, Oral, QAM  polyethylene glycol, 17 g, Oral, Daily  potassium chloride, 10 mEq, Oral, Daily  pramipexole, 1.5 mg, Oral, BID  senna-docusate sodium, 2 tablet, Oral, BID  sodium chloride, 10 mL, Intravenous, Q12H  tamsulosin, 0.4 mg, Oral, Daily  tiotropium bromide monohydrate, 2 puff, Inhalation, Daily       Infusions lactated ringers, 30 mL/hr  sodium chloride, 30 mL/hr, Last Rate: Stopped (08/08/23 1012)       PRN Medications   acetaminophen **OR** acetaminophen **OR** acetaminophen    albuterol    senna-docusate sodium **AND** polyethylene glycol **AND** bisacodyl **AND** bisacodyl    Calcium Replacement - Follow Nurse / BPA Driven Protocol    Magnesium Standard Dose Replacement - Follow Nurse / BPA Driven Protocol    ondansetron **OR** ondansetron    Phosphorus Replacement - Follow Nurse / BPA Driven Protocol    Potassium Replacement - Follow Nurse / BPA Driven Protocol    [COMPLETED] Insert Peripheral IV **AND** sodium chloride    sodium chloride    sodium chloride    sodium chloride     Physical Findings          Physical Appearance alert, frail, generalized wasting, loss of muscle mass, loss of subcutaneous fat, oriented, room air   Oral/Mouth  Cavity dentures   Edema  no edema   Gastrointestinal nausea, last bowel movement: 8/8   Skin  pressure injury: stage 1 L posterior heel, stage 1 sacral spine   Tubes/Drains/Lines none   NFPE Consented to exam, See Malnutrition Severity Assessment, Date Completed: 8/9   --  Malnutrition Severity Assessment      Patient meets criteria for : Severe Malnutrition (Based on ASPEN/AND, pt meets nutrition diagnosis of Severe Malnutrition of Chronic Disease due to inadequate po intake, 40 lb (19%) wt loss x 6 months and moderate to severe fat/muscle wasting noted on NFPE.)  Malnutrition Type (last 8 hours)       Malnutrition Severity Assessment       Row Name 08/09/23 1454       Malnutrition Severity Assessment    Malnutrition Type Chronic Disease - Related Malnutrition      Row Name 08/09/23 1454       Insufficient Energy Intake     Insufficient Energy Intake Findings Severe    Insufficient Energy Intake  <75% of est. energy requirement for > or equal to 3 months      Row Name 08/09/23 1454       Unintentional Weight Loss     Unintentional Weight Loss Findings Severe    Unintentional Weight Loss  Weight loss greater than 10% in six months  40 lb (19%) wt loss x 6 months      Row Name 08/09/23 1454       Muscle Loss    Loss of Muscle Mass Findings Severe    [place] Region Severe - deep hollowing/scooping, lack of muscle to touch, facial bones well defined    Clavicle Bone Region Severe - protruding prominent bone    Acromion Bone Region Moderate - acromion may slightly protrude    Scapular Bone Region Moderate - mild depression, bones may show slightly    Dorsal Hand Region Moderate - slight depression      Row Name 08/09/23 1454       Fat Loss    Subcutaneous Fat Loss Findings Severe    Orbital Region  Severe - pronounced hollowness/depression, dark circles, loose saggy skin    Upper Arm Region Severe - mostly skin, very little space between folds, fingers touch      Row Name 08/09/23 1454       Criteria Met (Must meet  criteria for severity in at least 2 of these categories: M Wasting, Fat Loss, Fluid, Secondary Signs, Wt. Status, Intake)    Patient meets criteria for  Severe Malnutrition  Based on ASPEN/AND, pt meets nutrition diagnosis of Severe Malnutrition of Chronic Disease due to inadequate po intake, 40 lb (19%) wt loss x 6 months and moderate to severe fat/muscle wasting noted on NFPE.                       Current Nutrition Orders & Evaluation of Intake       Oral Nutrition     Food Allergies NKFA   Current PO Diet Diet: Gastrointestinal Diets; Fiber-Restricted; Texture: Regular Texture (IDDSI 7); Fluid Consistency: Thin (IDDSI 0)   Supplement n/a   PO Evaluation     % PO Intake No meals yet today    # of Days Evaluated 1   --  PES STATEMENT / NUTRITION DIAGNOSIS      Nutrition Dx Problem  Problem: Malnutrition  Etiology: Medical Diagnosis and Factors Affecting Nutrition decreased appetite, generalized weakness  Signs/Symptoms: Report of Minimal PO Intake, Unintended Weight Change, and Report/Observation    Comment:    --  NUTRITION INTERVENTION / PLAN OF CARE      Intervention Goal(s) Nutrition support treatment, Improved nutrition related labs, Meet estimated needs, Disease management/therapy, Establish PO intake, Tolerate PO , Maintain weight, and No significant weight loss         RD Intervention/Action Encourage intake, Follow Tx Progress, Care plan reviewed, and Recommend/ordered:          Prescription/Orders:       PO Diet ADAT per GI recs      Supplements Boost Plus BID with meals      Snacks       Enteral Nutrition       Parenteral Nutrition    New Prescription Ordered? Yes   --      Monitor/Evaluation Per protocol, I&O, PO intake, Supplement intake, Pertinent labs, Weight, Skin status, GI status, Symptoms   Discharge Plan/Needs Pending clinical course   Education Will instruct as appropriate   --    RD to follow per protocol.      Electronically signed by:  Shaina Brunner RD  08/09/23 14:45 EDT

## 2023-08-10 ENCOUNTER — READMISSION MANAGEMENT (OUTPATIENT)
Dept: CALL CENTER | Facility: HOSPITAL | Age: 73
End: 2023-08-10
Payer: MEDICARE

## 2023-08-10 ENCOUNTER — HOME HEALTH ADMISSION (OUTPATIENT)
Dept: HOME HEALTH SERVICES | Facility: HOME HEALTHCARE | Age: 73
End: 2023-08-10
Payer: MEDICARE

## 2023-08-10 ENCOUNTER — DOCUMENTATION (OUTPATIENT)
Dept: HOME HEALTH SERVICES | Facility: HOME HEALTHCARE | Age: 73
End: 2023-08-10
Payer: MEDICARE

## 2023-08-10 VITALS
RESPIRATION RATE: 16 BRPM | SYSTOLIC BLOOD PRESSURE: 110 MMHG | WEIGHT: 170 LBS | TEMPERATURE: 97.5 F | HEIGHT: 77 IN | BODY MASS INDEX: 20.07 KG/M2 | HEART RATE: 62 BPM | OXYGEN SATURATION: 99 % | DIASTOLIC BLOOD PRESSURE: 56 MMHG

## 2023-08-10 PROBLEM — R13.10 DYSPHAGIA: Status: RESOLVED | Noted: 2021-11-10 | Resolved: 2023-08-10

## 2023-08-10 PROBLEM — R11.2 NAUSEA AND VOMITING: Status: RESOLVED | Noted: 2023-07-29 | Resolved: 2023-08-10

## 2023-08-10 PROBLEM — R11.0 NAUSEA: Status: RESOLVED | Noted: 2023-08-05 | Resolved: 2023-08-10

## 2023-08-10 PROBLEM — R33.8 ACUTE URINARY RETENTION: Status: RESOLVED | Noted: 2023-07-29 | Resolved: 2023-08-10

## 2023-08-10 PROBLEM — R13.10 DYSPHAGIA: Status: RESOLVED | Noted: 2023-08-05 | Resolved: 2023-08-10

## 2023-08-10 LAB
ANION GAP SERPL CALCULATED.3IONS-SCNC: 7.3 MMOL/L (ref 5–15)
BASOPHILS # BLD AUTO: 0.03 10*3/MM3 (ref 0–0.2)
BASOPHILS NFR BLD AUTO: 0.8 % (ref 0–1.5)
BUN SERPL-MCNC: 16 MG/DL (ref 8–23)
BUN/CREAT SERPL: 12.2 (ref 7–25)
CALCIUM SPEC-SCNC: 8.4 MG/DL (ref 8.6–10.5)
CHLORIDE SERPL-SCNC: 101 MMOL/L (ref 98–107)
CO2 SERPL-SCNC: 28.7 MMOL/L (ref 22–29)
CREAT SERPL-MCNC: 1.31 MG/DL (ref 0.76–1.27)
DEPRECATED RDW RBC AUTO: 44.3 FL (ref 37–54)
EGFRCR SERPLBLD CKD-EPI 2021: 56.8 ML/MIN/1.73
EOSINOPHIL # BLD AUTO: 0.16 10*3/MM3 (ref 0–0.4)
EOSINOPHIL NFR BLD AUTO: 4.4 % (ref 0.3–6.2)
ERYTHROCYTE [DISTWIDTH] IN BLOOD BY AUTOMATED COUNT: 13.6 % (ref 12.3–15.4)
GLUCOSE SERPL-MCNC: 102 MG/DL (ref 65–99)
HCT VFR BLD AUTO: 32 % (ref 37.5–51)
HGB BLD-MCNC: 10.5 G/DL (ref 13–17.7)
IMM GRANULOCYTES # BLD AUTO: 0.01 10*3/MM3 (ref 0–0.05)
IMM GRANULOCYTES NFR BLD AUTO: 0.3 % (ref 0–0.5)
LAB AP CASE REPORT: NORMAL
LAB AP DIAGNOSIS COMMENT: NORMAL
LYMPHOCYTES # BLD AUTO: 1.2 10*3/MM3 (ref 0.7–3.1)
LYMPHOCYTES NFR BLD AUTO: 33 % (ref 19.6–45.3)
MAGNESIUM SERPL-MCNC: 2 MG/DL (ref 1.6–2.4)
MCH RBC QN AUTO: 29.2 PG (ref 26.6–33)
MCHC RBC AUTO-ENTMCNC: 32.8 G/DL (ref 31.5–35.7)
MCV RBC AUTO: 88.9 FL (ref 79–97)
MONOCYTES # BLD AUTO: 0.44 10*3/MM3 (ref 0.1–0.9)
MONOCYTES NFR BLD AUTO: 12.1 % (ref 5–12)
NEUTROPHILS NFR BLD AUTO: 1.8 10*3/MM3 (ref 1.7–7)
NEUTROPHILS NFR BLD AUTO: 49.4 % (ref 42.7–76)
NRBC BLD AUTO-RTO: 0 /100 WBC (ref 0–0.2)
PATH REPORT.FINAL DX SPEC: NORMAL
PATH REPORT.GROSS SPEC: NORMAL
PHOSPHATE SERPL-MCNC: 2.7 MG/DL (ref 2.5–4.5)
PLATELET # BLD AUTO: 179 10*3/MM3 (ref 140–450)
PMV BLD AUTO: 10 FL (ref 6–12)
POTASSIUM SERPL-SCNC: 3.6 MMOL/L (ref 3.5–5.2)
RBC # BLD AUTO: 3.6 10*6/MM3 (ref 4.14–5.8)
SODIUM SERPL-SCNC: 137 MMOL/L (ref 136–145)
WBC NRBC COR # BLD: 3.64 10*3/MM3 (ref 3.4–10.8)

## 2023-08-10 PROCEDURE — 94799 UNLISTED PULMONARY SVC/PX: CPT

## 2023-08-10 PROCEDURE — 94664 DEMO&/EVAL PT USE INHALER: CPT

## 2023-08-10 PROCEDURE — 83735 ASSAY OF MAGNESIUM: CPT | Performed by: INTERNAL MEDICINE

## 2023-08-10 PROCEDURE — 80048 BASIC METABOLIC PNL TOTAL CA: CPT | Performed by: INTERNAL MEDICINE

## 2023-08-10 PROCEDURE — 94761 N-INVAS EAR/PLS OXIMETRY MLT: CPT

## 2023-08-10 PROCEDURE — 85025 COMPLETE CBC W/AUTO DIFF WBC: CPT | Performed by: INTERNAL MEDICINE

## 2023-08-10 PROCEDURE — 84100 ASSAY OF PHOSPHORUS: CPT | Performed by: INTERNAL MEDICINE

## 2023-08-10 PROCEDURE — 25010000002 METOCLOPRAMIDE PER 10 MG: Performed by: INTERNAL MEDICINE

## 2023-08-10 RX ORDER — POTASSIUM CHLORIDE 750 MG/1
40 TABLET, FILM COATED, EXTENDED RELEASE ORAL EVERY 4 HOURS
Status: DISCONTINUED | OUTPATIENT
Start: 2023-08-10 | End: 2023-08-10 | Stop reason: HOSPADM

## 2023-08-10 RX ORDER — METOCLOPRAMIDE 5 MG/1
5 TABLET ORAL
Qty: 90 TABLET | Refills: 1 | Status: SHIPPED | OUTPATIENT
Start: 2023-08-10

## 2023-08-10 RX ORDER — FLUCONAZOLE 100 MG/1
200 TABLET ORAL DAILY
Qty: 28 TABLET | Refills: 0 | Status: SHIPPED | OUTPATIENT
Start: 2023-08-10 | End: 2023-08-24

## 2023-08-10 RX ORDER — CASTOR OIL AND BALSAM, PERU 788; 87 MG/G; MG/G
1 OINTMENT TOPICAL EVERY 12 HOURS SCHEDULED
Qty: 56.7 G | Refills: 0 | Status: SHIPPED | OUTPATIENT
Start: 2023-08-10

## 2023-08-10 RX ADMIN — PANTOPRAZOLE SODIUM 40 MG: 40 TABLET, DELAYED RELEASE ORAL at 06:10

## 2023-08-10 RX ADMIN — BISACODYL 5 MG: 5 TABLET ORAL at 09:03

## 2023-08-10 RX ADMIN — POLYETHYLENE GLYCOL 3350 17 G: 17 POWDER, FOR SOLUTION ORAL at 09:03

## 2023-08-10 RX ADMIN — METOPROLOL TARTRATE 50 MG: 50 TABLET, FILM COATED ORAL at 09:03

## 2023-08-10 RX ADMIN — FUROSEMIDE 40 MG: 40 TABLET ORAL at 09:02

## 2023-08-10 RX ADMIN — PRAMIPEXOLE DIHYDROCHLORIDE 1.5 MG: 1.5 TABLET ORAL at 09:02

## 2023-08-10 RX ADMIN — PAROXETINE HYDROCHLORIDE HEMIHYDRATE 40 MG: 20 TABLET, FILM COATED ORAL at 06:10

## 2023-08-10 RX ADMIN — CASTOR OIL AND BALSAM, PERU 1 APPLICATION: 788; 87 OINTMENT TOPICAL at 09:02

## 2023-08-10 RX ADMIN — METOCLOPRAMIDE 5 MG: 5 INJECTION, SOLUTION INTRAMUSCULAR; INTRAVENOUS at 09:03

## 2023-08-10 RX ADMIN — SENNOSIDES AND DOCUSATE SODIUM 2 TABLET: 50; 8.6 TABLET ORAL at 09:03

## 2023-08-10 RX ADMIN — TAMSULOSIN HYDROCHLORIDE 0.4 MG: 0.4 CAPSULE ORAL at 09:03

## 2023-08-10 RX ADMIN — POTASSIUM CHLORIDE 40 MEQ: 750 TABLET, EXTENDED RELEASE ORAL at 09:02

## 2023-08-10 RX ADMIN — METOCLOPRAMIDE 5 MG: 5 INJECTION, SOLUTION INTRAMUSCULAR; INTRAVENOUS at 02:32

## 2023-08-10 RX ADMIN — Medication 10 ML: at 09:04

## 2023-08-10 RX ADMIN — TIOTROPIUM BROMIDE INHALATION SPRAY 2 PUFF: 3.12 SPRAY, METERED RESPIRATORY (INHALATION) at 07:22

## 2023-08-10 RX ADMIN — APIXABAN 5 MG: 5 TABLET, FILM COATED ORAL at 09:03

## 2023-08-10 NOTE — OUTREACH NOTE
Prep Survey      Flowsheet Row Responses   [facility] facility patient discharged from? [place]   Is LACE score < 7 ? No   Eligibility [facility]   Date of Admission 08/05/23   Date of Discharge 08/10/23   Discharge Disposition Home or Self Care   Discharge diagnosis NauseaHistory of esophageal cancer   Does the patient have one of the following disease processes/diagnoses(primary or secondary)? Other   Does the patient have Home health ordered? Yes   What is the Home health agency?  [facility]   Is there a DME ordered? No   Prep survey completed? Yes            AYAKA FITCH - Registered Nurse

## 2023-08-10 NOTE — PLAN OF CARE
Problem: Adult Inpatient Plan of Care  Goal: Plan of Care Review  Outcome: Ongoing, Progressing  Flowsheets (Taken 8/10/2023 0436)  Progress: improving  Plan of Care Reviewed With: patient  Outcome Evaluation: Patient had no complaints overnight. Handle his meals well. no nausea. Ambulated to BR. Metoprolol held d/t BP low. A&Ox4. VSS. All needs met. Plan to D/C home today.

## 2023-08-10 NOTE — PROGRESS NOTES
[facility] following for home health needs.  All info verified with wife and she would like to be called for visit scheduling at [phone].  F/C is indwelling.  May accept orders from Dr Montoya and Dr Valencia.  Referral to be transcribed per PCP Dr Brandin Bolton.

## 2023-08-10 NOTE — DISCHARGE SUMMARY
"    Patient Name: Jose Hurtado  : 1950  MRN: [id number]    Date of Admission: 2023  Date of Discharge:  8/10/2023  Primary Care Physician: Brandin Bolton MD      Chief Complaint:   Nausea (Home health sent to [facility] for anorexia/nausea after being sent home yesterday. Hx of \"esophageal stretch\")      Discharge Diagnoses     Active Hospital Problems    Diagnosis  POA    Dehydration [E86.0]  Yes    Weight loss [R63.4]  Unknown    Other symptoms and signs concerning food and fluid intake [R63.8]  Yes    Severe malnutrition [E43]  Yes    Chronic obstructive pulmonary disease [J44.9]  Yes    CKD (chronic kidney disease) stage 2, GFR 60-89 ml/min [N18.2]  Yes    Chronic anticoagulation [Z79.01]  Not Applicable    History of recurrent deep vein thrombosis (DVT) [Z86.718]  Not Applicable    Gastroparesis [K31.84]  Yes    Hypertension [I10]  Yes    History of esophageal cancer [Z85.01]  Yes      Resolved Hospital Problems    Diagnosis Date Resolved POA    Nausea [R11.0] 08/10/2023 Yes    Dysphagia [R13.10] 08/10/2023 Yes    Acute urinary retention [R33.8] 08/10/2023 Yes    Nausea and vomiting [R11.2] 08/10/2023 Unknown    Dysphagia [R13.10] 08/10/2023 Unknown        Brief Admitting HPI     Mr. Hurtado is a 73 y.o. male with a history of esophageal cancer s/p esophagectomy, antiphospholipid syndrome on chronic anticoagulation, paroxysmal atrial fibrillation, hypertension, CKD 2, hearing loss, lymphedema, prior DVT and recent intra-abdominal infection, acute urinary retention who presented to [facility] with complaints of decreased appetite, decreased oral intake and retching after attempts to eat at home prior to arrival. Of note, patient was discharged from here yesterday after hospital stay for intra-abdominal infection and acute urinary retention. Once he arrived home, symptoms as above began and have been near constant since then. Wife at bedside who helps to provide further insight. Given patient's " history of esophageal cancer with prior esophagectomy, patient and wife concerned that he may need repeat EGD to evaluate for any underlying cause to explain symptoms. He is being admitted for further evaluation and management.     Hospital Course     Pt admitted for nausea and vomiting, abdominal pain after eating.  He was seen in consultation with GI and underwent EGD on 8/7/2023, the first of which had to be aborted due to retained food.  He went underwent repeat EGD the next day which also showed large amount of food in the stomach, and gastric emptying study was recommended, with findings of delayed transit time.  He was started on Reglan with good improvement of symptoms and was able to tolerate p.o. diet.  Biopsies from EGD with findings c/w esophageal candidiasis, for which fluconazole Rx prescribed.  He will follow-up in with GI in 2 to 3 weeks.  Discussed with patient and wife, he is stable for discharge home this time.    Discharge Plan     Gastroparesis  N/V  Severe malnutrition  Dysphagia  Esophageal CA (s/p esophagectomy)  -EGD aborted 8/7/2023 due to inadequate prep; repeat on 8/8 with large amount of food in the stomach  -gastric emptying study on 8/8/2023 w/ delayed transit  -Reglan 5mg TID  -f/u with GI in 2-3wks     Intraabdominal infection  -From recent hospital admission, seen on CT w/ colitis/proctitis  -Flex sig on 8/2 with 2 small ulcers in rectum and rectosigmoid colon, nonbleeding internal hemorrhoids  -completed 7d cefdinir and Flagyl     Acute urinary retention  -Failed voiding trial prior to discharge, King catheter in place  -OP follow-up with urology     COPD, not in exacerbation  -Symbicort, Spiriva  -DuoNebs prn     HLD  -atorvastatin 40mg qhs     Afib w/ RVR  -RC: metoprolol 50mg BID  -AC: apixaban     Hx DVT  APL syndrome  -apixaban as above     Depression  -Paxil 40mg qam     Chronic myocardial injury  -trop 59 > 61; no further ischemic eval indicated at this time    Day of  Discharge     Physical Exam:  Temp:  [97.3 øF (36.3 øC)-98.1 øF (36.7 øC)] 97.5 øF (36.4 øC)  Heart Rate:  [52-77] 62  Resp:  [16-20] 16  BP: ()/(50-58) 110/56  Body mass index is 20.16 kg/mý.  Physical Exam  Constitutional:       General: He is not in acute distress.     Appearance: He is ill-appearing.   Eyes:      Extraocular Movements: Extraocular movements intact.      Pupils: Pupils are equal, round, and reactive to light.   Cardiovascular:      Rate and Rhythm: Normal rate.   Pulmonary:      Effort: Pulmonary effort is normal.      Breath sounds: No stridor.   Abdominal:      General: There is no distension.      Tenderness: There is no abdominal tenderness. There is no guarding or rebound.      Hernia: No hernia is present.   Skin:     Coloration: Skin is not jaundiced.   Neurological:      General: No focal deficit present.      Mental Status: He is alert and oriented to person, place, and time.   Psychiatric:         Mood and Affect: Mood normal.         Behavior: Behavior normal.     Consultants     Consult Orders (all) (From admission, onward)       Start     Ordered    08/06/23 0702  Inpatient Gastroenterology Consult  [place]         Specialty:  Gastroenterology  Provider:  Jameel Valencia MD    08/05/23 1911 08/05/23 1910  Inpatient Nutrition Consult  Once        Provider:  (Not yet assigned)    08/05/23 1911 08/05/23 1730  Inpatient Case Management  Consult  Once        Provider:  (Not yet assigned)    08/05/23 1730    08/05/23 1620  [facility] (on-call MD unless specified) Details  Once        Specialty:  Hospitalist  Provider:  Nitehs Oglesby DO    08/05/23 1619                  Procedures     ESOPHAGOGASTRODUODENOSCOPY with bx      Imaging Results (All)       Procedure Component Value Units Date/Time    NM Gastric Emptying [[id number]] Collected: 08/09/23 1416     Updated: 08/09/23 1420    Narrative:      NUCLEAR MEDICINE GASTRIC EMPTYING STUDY (FOUR HOUR PROTOCOL)     HISTORY:  Male who is 73 years-old, with a history of nausea and vomiting  with retained food on EGD.     Following the administration of 520 uCi of 99m technetium sulfur colloid  mixed in a standard meal, routine analysis was performed. Imaging and  measurements obtained 1 hour intervals through 4 hours.     Residual activity at two hours is 51%. Normal two hour residual is 60%  or less.     Residual activity at three hours is 37%. Normal three hour residual is  30% or less.     Residual activity at 4 hours is 27%. Normal four hour residual is 10% or  less.     Gastric empty half time measures 126 minutes. Normal range 80 to 120  minutes.        Impression:      Delayed gastric emptying.     This report was finalized on 8/9/2023 2:17 PM by Dr. Darian Howell M.D.       XR Chest 1 View [[id number]] Collected: 08/05/23 1437     Updated: 08/05/23 1443    Narrative:      AP CHEST     HISTORY: Nausea and fatigue     COMPARISON: 07/29/2023     FINDINGS: Stable changes of esophagectomy and gastric pull-through.  Stable minimal right pleural effusion. No new infiltrates are seen in  the left lung. Heart size stable. Postoperative changes of the cervical  spine       Impression:      Stable exam     This report was finalized on 8/5/2023 2:40 PM by Dr. Miguel Hgoan M.D.             Results for orders placed in visit on 10/10/22    Duplex Venous Lower Extremity - Left CAR    Interpretation Summary    Chronic left lower extremity deep vein thrombosis noted in the popliteal and gastrocnemius.    All other left sided veins appeared normal.    Results for orders placed during the hospital encounter of 05/12/22    Adult Transthoracic Echo Complete w/ Color, Spectral and Contrast if necessary per protocol    Interpretation Summary  ú Estimated right ventricular systolic pressure from tricuspid regurgitation is mildly elevated (35-45 mmHg). Calculated right ventricular systolic pressure from tricuspid regurgitation is 43 mmHg.  ú Left  ventricular wall thickness is consistent with mild concentric hypertrophy.  ú Estimated left ventricular EF = 56% Left ventricular systolic function is normal.  ú Left ventricular diastolic function was normal.    Pertinent Labs     Results from last 7 days   Lab Units 08/10/23  0529 08/09/23  0604 08/08/23  0625 08/07/23  0617   WBC 10*3/mm3 3.64 3.58 3.47 3.61   HEMOGLOBIN g/dL 10.5* 10.7* 10.4* 10.2*   PLATELETS 10*3/mm3 179 169 160 148     Results from last 7 days   Lab Units 08/10/23  0529 08/09/23  0604 08/08/23  0625 08/07/23  0617   SODIUM mmol/L 137 134* 137 139   POTASSIUM mmol/L 3.6 3.8 4.4 3.9   CHLORIDE mmol/L 101 98 101 103   CO2 mmol/L 28.7 29.2* 31.0* 29.2*   BUN mg/dL 16 12 12 14   CREATININE mg/dL 1.31* 1.08 1.04 1.11   GLUCOSE mg/dL 102* 84 84 94   EGFR mL/min/1.73 56.8* 71.6 74.9 69.2     Results from last 7 days   Lab Units 08/08/23  0625 08/05/23  1427   ALBUMIN g/dL 2.9* 3.3*   BILIRUBIN mg/dL 0.3 0.2   ALK PHOS U/L 78 80   AST (SGOT) U/L 15 14   ALT (SGPT) U/L 9 10     Results from last 7 days   Lab Units 08/10/23  0529 08/09/23  0604 08/08/23  1754 08/08/23  0625 08/07/23  0617 08/06/23  0734 08/05/23  1427   CALCIUM mg/dL 8.4* 8.5*  --  8.4* 8.1*   < > 8.7   ALBUMIN g/dL  --   --   --  2.9*  --   --  3.3*   MAGNESIUM mg/dL 2.0 2.0  --  1.8 1.9   < > 2.1   PHOSPHORUS mg/dL 2.7 2.4* 2.9 2.1* 2.9   < > 2.3*    < > = values in this interval not displayed.               Invalid input(s): LDLCALC          Test Results Pending at Discharge           Discharge Details        Discharge Medications        New Medications        Instructions Start Date   castor oil-balsam peru ointment   1 application , Topical, Every 12 Hours Scheduled      fluconazole 100 MG tablet  Commonly known as: Diflucan   200 mg, Oral, Daily      metoclopramide 5 MG tablet  Commonly known as: Reglan   5 mg, Oral, 3 Times Daily Before Meals             Continue These Medications        Instructions Start Date   albuterol  sulfate  (90 Base) MCG/ACT inhaler  Commonly known as: PROVENTIL HFA;VENTOLIN HFA;PROAIR HFA   1 puff, Inhalation, Every 4 Hours PRN      atorvastatin 40 MG tablet  Commonly known as: LIPITOR   40 mg, Oral, Nightly      bisacodyl 5 MG EC tablet  Commonly known as: DULCOLAX   5 mg, Oral, Daily      cyanocobalamin 1000 MCG/ML injection   INJECT 1 ML INTRAMUSCULARLY ONCE EVERY 30 DAYS AS DIRECTED      Eliquis 5 MG tablet tablet  Generic drug: apixaban   TAKE ONE TABLET BY MOUTH EVERY 12 HOURS      furosemide 40 MG tablet  Commonly known as: LASIX   40 mg, Oral, Daily      Gemtesa 75 MG tablet  Generic drug: Vibegron   Daily      metoprolol tartrate 50 MG tablet  Commonly known as: LOPRESSOR   50 mg, Oral, 2 Times Daily      pantoprazole 40 MG EC tablet  Commonly known as: PROTONIX   TAKE ONE TABLET BY MOUTH TWICE A DAY BEFORE A MEAL      PARoxetine 40 MG tablet  Commonly known as: PAXIL   TAKE ONE TABLET BY MOUTH EVERY MORNING      polyethylene glycol 17 g packet  Commonly known as: MIRALAX   17 g, Oral, Daily      potassium chloride 10 MEQ CR capsule  Commonly known as: MICRO-K   10 mEq, Oral, Daily      pramipexole 1.5 MG tablet  Commonly known as: MIRAPEX   TAKE ONE TABLET BY MOUTH TWICE A DAY      sennosides-docusate 8.6-50 MG per tablet  Commonly known as: PERICOLACE   2 tablets, Oral, 2 Times Daily      Spiriva Respimat 2.5 MCG/ACT aerosol solution inhaler  Generic drug: tiotropium bromide monohydrate   2 puffs, Inhalation, Daily      Stiolto Respimat 2.5-2.5 MCG/ACT aerosol solution inhaler  Generic drug: tiotropium bromide-olodaterol   Inhalation, Daily - RT      tamsulosin 0.4 MG capsule 24 hr capsule  Commonly known as: FLOMAX   0.4 mg, Oral, Daily             Stop These Medications      cefdinir 300 MG capsule  Commonly known as: OMNICEF     metroNIDAZOLE 500 MG tablet  Commonly known as: Flagyl              No Known Allergies    Discharge Disposition:  Home or Self Care      Discharge Diet:  Diet  Order   Procedures    Diet: Gastrointestinal Diets; Fiber-Restricted; Texture: Regular Texture (IDDSI 7); Fluid Consistency: Thin (IDDSI 0)       Discharge Activity:   Activity Instructions       Activity as Tolerated              CODE STATUS:    Code Status and Medical Interventions:   Ordered at: 08/05/23 1911     Code Status (Patient has no pulse and is not breathing):    CPR (Attempt to Resuscitate)     Medical Interventions (Patient has pulse or is breathing):    Full Support     Release to patient:    Routine Release       Future Appointments   Date Time Provider Department [place]   8/15/2023  1:40 PM LAB CHAIR 1 [facility]   8/15/2023  2:00 PM George Phelan II, MD [facility]   8/21/2023  1:30 PM Mervat Ramachandran R, PT [facility]   8/25/2023  1:30 PM Mervat Ramachandran R, PT [facility]   8/28/2023  1:30 PM Mervat Ramachandran R, PT [facility]   9/1/2023 10:30 AM Mervat Ramachandran R, PT [facility]   9/5/2023  1:30 PM Mervat Ramachandran R, PT [facility]   9/8/2023  1:30 PM Mervat Ramachandran R, PT [facility]   9/11/2023  1:30 PM Mervat Ramachandran R, PT [facility]   9/13/2023  1:30 PM Mervat Ramachandran R, PT [facility]   9/15/2023  1:30 PM Mervat Ramachandran R, PT [facility]   9/18/2023  1:30 PM Yumi Grider APRN [facility]   9/19/2023  1:30 PM Mervat Ramachandran R, PT [facility]   9/21/2023  1:30 PM Mervat Ramachandran R, PT [facility]   9/25/2023  1:30 PM Mervat Ramachandran R, PT [facility]   9/27/2023  1:30 PM Mervat Ramachandran R, PT [facility]   9/27/2023  3:00 PM Brandin Bolton MD [facility]   3/25/2024 12:30 PM Darian Maldonado Jr., MD [facility]   4/4/2024 10:30 AM TON[facility] CT 3 BH [facility]   4/17/2024  1:00 PM Verito Julio APRN [facility]      Contact information for follow-up providers       Brandin Bolton MD Follow up in 1 week(s).    Specialty: Internal Medicine  Contact information:  [address]  [address]  [phone]               Jameel Valencia MD Follow up in 2 week(s).    Specialty: Gastroenterology  Contact information:  [address]  [facility] [address]  [place] [address]  [phone]                       Contact information for after-discharge care       Home Medical Care       [facility] .    Service: Home Health Services  Contact information:  [address] [facility] [address]  [facility] [address] [phone]                                   Time Spent on Discharge:  Greater than 30 minutes spent on discharge management including final examination, discussion of hospital stay and patient education, preparation of records, medication reconciliation, follow up planning      Carloz Ortiz MD  [facility]  08/10/23  08:10 EDT

## 2023-08-10 NOTE — PLAN OF CARE
Goal Outcome Evaluation:                   Outcome Evaluation: A&Ox4. RA. Catheter in place, leg bag provided prior to discharge. Instructions for catheter care provided to patient. IV reglan, switch to PO for home. No complaints of nausea. Tolerated food. Up SBA with walker. Potassium replaced, sent home on daily potassium tablets also. VSS.

## 2023-08-11 ENCOUNTER — TRANSITIONAL CARE MANAGEMENT TELEPHONE ENCOUNTER (OUTPATIENT)
Dept: CALL CENTER | Facility: HOSPITAL | Age: 73
End: 2023-08-11
Payer: MEDICARE

## 2023-08-11 NOTE — OUTREACH NOTE
Call Center TCM Note      Flowsheet Row Responses   [facility] patient discharged from? [place]   Does the patient have one of the following disease processes/diagnoses(primary or secondary)? Other   TCM attempt successful? Yes   Call start time 1104   Call end time 1107   Discharge diagnosis Nausea,  History of esophageal cancer   Is patient permission given to speak with other caregiver? Yes   List who call center can speak with spouse- Lena   Person spoke with today (if not patient) and relationship spouse   Does the patient have all medications ordered at discharge? Yes   Is the patient taking all medications as directed (includes completed medication regime)? Yes   Does the patient have an appointment with their PCP within 7-14 days of discharge? No   What is the Home health agency?  [facility]   Has home health visited the patient within 72 hours of discharge? Call prior to 72 hours   Psychosocial issues? No   Did the patient receive a copy of their discharge instructions? Yes   Nursing interventions Reviewed instructions with patient   What is the patient's perception of their health status since discharge? Improving   Is the patient/caregiver able to teach back signs and symptoms related to disease process for when to call PCP? Yes   Is the patient/caregiver able to teach back signs and symptoms related to disease process for when to call 911? Yes   Is the patient/caregiver able to teach back the hierarchy of who to call/visit for symptoms/problems? PCP, Specialist, Home health nurse, Urgent Care, ED, 911 Yes   TCM call completed? Yes   Wrap up additional comments Per spouse, patient is doing well, no questions, patient will be calling PCP office later today to make a f/u appt.   Call end time 1107   Would this patient benefit from a Referral to [facility] Social Work? No   Is the patient interested in additional calls from an ambulatory ? No            Bernie Jaquez RN    8/11/2023, 11:07  EDT

## 2023-08-12 ENCOUNTER — HOME CARE VISIT (OUTPATIENT)
Dept: HOME HEALTH SERVICES | Facility: HOME HEALTHCARE | Age: 73
End: 2023-08-12
Payer: MEDICARE

## 2023-08-12 VITALS
SYSTOLIC BLOOD PRESSURE: 110 MMHG | HEART RATE: 81 BPM | TEMPERATURE: 98.7 F | OXYGEN SATURATION: 100 % | RESPIRATION RATE: 16 BRPM | DIASTOLIC BLOOD PRESSURE: 90 MMHG

## 2023-08-12 PROCEDURE — G0299 HHS/HOSPICE OF RN EA 15 MIN: HCPCS

## 2023-08-12 NOTE — Clinical Note
I am writing to notify you that [facility] is seeing patient starting 8/12. Pt is requesting that [facility] with Home Health evaluates patient in home d/t recent D/C from [facility]. If you have any questions you can reach me at [phone]  Thank you   Ariane

## 2023-08-13 NOTE — HOME HEALTH
"SOC Note: PT AOx4, lives at home with wife, in 1 floor condo with no stairs, pt reports at this time using a walker, he uses a cane when out of the house. Pt DC yesterday from 6 days inpatient from [facility], and seen by GI for nausea, malnutrition, nausea, dx with gastroparesis and dysphasia. Pt dx with proctitis and constipation from prior hospitalization and has indwelling cath, has f/u appointment with urology. All fe/u appointments are scheduled. Prior to recent hospitalizations, patient was driving self to outpatient PT. Pt has 1 bedroom converted in home dedicated to physical Therapy and plans to cont with outpatient once he gains strength back in home. At this time patient wants PT in home.     Home Health ordered for: disciplines SN, PT, Speech    Focus of Care: Gastroparesis, malnutrition, nausea.     Patient's goal(s): \"To get my strength back\"    Current Functional status/mobility/DME: Catheter, Cane, Walker, Shower Chair, grab bars in bathroom, non slip socks, blood pressure cuff, o2 probe, CPAP, treadmill, Stationary bike    Code Status: Full    Fall Risk/Safety concerns: patient and wife have home set up approaite for fall safety.     Medication issues/Concerns: Pt understands all medications, reviewed with patient and wife    Plan for next visit: CP assessment, GI/ assessment"

## 2023-08-14 ENCOUNTER — HOME CARE VISIT (OUTPATIENT)
Dept: HOME HEALTH SERVICES | Facility: HOME HEALTHCARE | Age: 73
End: 2023-08-14
Payer: MEDICARE

## 2023-08-14 VITALS
OXYGEN SATURATION: 98 % | DIASTOLIC BLOOD PRESSURE: 60 MMHG | SYSTOLIC BLOOD PRESSURE: 120 MMHG | HEART RATE: 99 BPM | RESPIRATION RATE: 16 BRPM | TEMPERATURE: 97.3 F

## 2023-08-14 PROCEDURE — G0151 HHCP-SERV OF PT,EA 15 MIN: HCPCS

## 2023-08-14 NOTE — PROGRESS NOTES
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

## 2023-08-14 NOTE — HOME HEALTH
"REASON FOR REFERRAL : 74 y/o male 6 days inpatient from [facility], and seen by GI for nausea, malnutrition, nausea, dx with gastroparesis and dysphasia. Pt dx with proctitis and constipation from prior hospitalization   Prior to recent hospitalizations, patient was driving self to outpatient PT. Pt has 1 bedroom converted in home dedicated to physical Therapy and plans to cont with outpatient once he gains strength back in home.     SURGICAL PROCEDURE: na     PAST MEDICAL HISTORY:  COPD  SUBJECTIVE: " I want to go outpatient PT again and want to work on my balance\"  PRIOR LEVEL OF FUNCTION:  Independent ambulation with 2 canes, intermittently uses rolling walker able when feeling week. Went to outpatient PT and also to [facility]  for exercises, has .  Independent ADL's, I-ADL's, driving.      CAREGIVER: wife    HOME ENVIRONMENT: lives at home with wife, in 1 floor condo with no stairs  PATIENT GOAL FOR THIS EPISODE OF CARE:  \"I want to get my strength back  \"     MENTAL STATUS:  Alert & oriented x 4     EDEMA:  mod to max edema in   both LE.     WOUND / SKIN CONDITION:SIGNS OF INFECTION:  None     POSTURE:  mod forward flexion, no lumbar lordosis,     MUSCLE TONE/COORDINATION:  WFL     OBJECTIVE ASSESSMENT TOOL UTILIZED:     Timed up and go test is seconds 23 seconds    PLAN:  2 week 2..          PLAN FOR NEXT VISIT:  Review , progress HEP improving balance and posture."

## 2023-08-15 ENCOUNTER — LAB (OUTPATIENT)
Dept: OTHER | Facility: HOSPITAL | Age: 73
End: 2023-08-15
Payer: MEDICARE

## 2023-08-15 ENCOUNTER — OFFICE VISIT (OUTPATIENT)
Dept: ONCOLOGY | Facility: CLINIC | Age: 73
End: 2023-08-15
Payer: MEDICARE

## 2023-08-15 ENCOUNTER — TELEPHONE (OUTPATIENT)
Dept: GASTROENTEROLOGY | Facility: CLINIC | Age: 73
End: 2023-08-15
Payer: MEDICARE

## 2023-08-15 VITALS
DIASTOLIC BLOOD PRESSURE: 72 MMHG | HEART RATE: 74 BPM | RESPIRATION RATE: 16 BRPM | WEIGHT: 174.9 LBS | OXYGEN SATURATION: 98 % | BODY MASS INDEX: 20.65 KG/M2 | SYSTOLIC BLOOD PRESSURE: 132 MMHG | TEMPERATURE: 97.7 F | HEIGHT: 77 IN

## 2023-08-15 DIAGNOSIS — E61.1 IRON DEFICIENCY: Primary | ICD-10-CM

## 2023-08-15 DIAGNOSIS — C61 MALIGNANT NEOPLASM OF PROSTATE: ICD-10-CM

## 2023-08-15 LAB
BASOPHILS # BLD AUTO: 0.04 10*3/MM3 (ref 0–0.2)
BASOPHILS NFR BLD AUTO: 0.9 % (ref 0–1.5)
DEPRECATED RDW RBC AUTO: 48.1 FL (ref 37–54)
EOSINOPHIL # BLD AUTO: 0.12 10*3/MM3 (ref 0–0.4)
EOSINOPHIL NFR BLD AUTO: 2.6 % (ref 0.3–6.2)
ERYTHROCYTE [DISTWIDTH] IN BLOOD BY AUTOMATED COUNT: 14.1 % (ref 12.3–15.4)
FERRITIN SERPL-MCNC: 314.9 NG/ML (ref 30–400)
HCT VFR BLD AUTO: 33.2 % (ref 37.5–51)
HGB BLD-MCNC: 10.3 G/DL (ref 13–17.7)
HGB RETIC QN AUTO: 30.1 PG (ref 29.8–36.1)
IMM GRANULOCYTES # BLD AUTO: 0.01 10*3/MM3 (ref 0–0.05)
IMM GRANULOCYTES NFR BLD AUTO: 0.2 % (ref 0–0.5)
IMM RETICS NFR: 13.2 % (ref 3–15.8)
IRON 24H UR-MRATE: 38 MCG/DL (ref 59–158)
IRON SATN MFR SERPL: 16 % (ref 20–50)
LYMPHOCYTES # BLD AUTO: 1.24 10*3/MM3 (ref 0.7–3.1)
LYMPHOCYTES NFR BLD AUTO: 26.8 % (ref 19.6–45.3)
MCH RBC QN AUTO: 28.9 PG (ref 26.6–33)
MCHC RBC AUTO-ENTMCNC: 31 G/DL (ref 31.5–35.7)
MCV RBC AUTO: 93.3 FL (ref 79–97)
MONOCYTES # BLD AUTO: 0.3 10*3/MM3 (ref 0.1–0.9)
MONOCYTES NFR BLD AUTO: 6.5 % (ref 5–12)
NEUTROPHILS NFR BLD AUTO: 2.91 10*3/MM3 (ref 1.7–7)
NEUTROPHILS NFR BLD AUTO: 63 % (ref 42.7–76)
NRBC BLD AUTO-RTO: 0 /100 WBC (ref 0–0.2)
PLATELET # BLD AUTO: 201 10*3/MM3 (ref 140–450)
PMV BLD AUTO: 9.5 FL (ref 6–12)
RBC # BLD AUTO: 3.56 10*6/MM3 (ref 4.14–5.8)
RETICS # AUTO: 0.03 10*6/MM3 (ref 0.02–0.13)
RETICS/RBC NFR AUTO: 0.88 % (ref 0.7–1.9)
TIBC SERPL-MCNC: 231 MCG/DL (ref 298–536)
TRANSFERRIN SERPL-MCNC: 155 MG/DL (ref 200–360)
WBC NRBC COR # BLD: 4.62 10*3/MM3 (ref 3.4–10.8)

## 2023-08-15 PROCEDURE — 85025 COMPLETE CBC W/AUTO DIFF WBC: CPT | Performed by: INTERNAL MEDICINE

## 2023-08-15 PROCEDURE — 85046 RETICYTE/HGB CONCENTRATE: CPT | Performed by: INTERNAL MEDICINE

## 2023-08-15 PROCEDURE — 83540 ASSAY OF IRON: CPT | Performed by: INTERNAL MEDICINE

## 2023-08-15 PROCEDURE — 3075F SYST BP GE 130 - 139MM HG: CPT | Performed by: INTERNAL MEDICINE

## 2023-08-15 PROCEDURE — 3078F DIAST BP <80 MM HG: CPT | Performed by: INTERNAL MEDICINE

## 2023-08-15 PROCEDURE — 82728 ASSAY OF FERRITIN: CPT | Performed by: INTERNAL MEDICINE

## 2023-08-15 PROCEDURE — 99214 OFFICE O/P EST MOD 30 MIN: CPT | Performed by: INTERNAL MEDICINE

## 2023-08-15 PROCEDURE — 1126F AMNT PAIN NOTED NONE PRSNT: CPT | Performed by: INTERNAL MEDICINE

## 2023-08-15 PROCEDURE — 36415 COLL VENOUS BLD VENIPUNCTURE: CPT

## 2023-08-15 PROCEDURE — 84466 ASSAY OF TRANSFERRIN: CPT | Performed by: INTERNAL MEDICINE

## 2023-08-15 NOTE — TELEPHONE ENCOUNTER
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

## 2023-08-15 NOTE — PROGRESS NOTES
Subjective .     REASONS FOR FOLLOWUP:  Esophageal cancer, cytopenias     HISTORY OF PRESENT ILLNESS:  The patient is a 73 y.o. year old male  who is here for follow-up with the above-mentioned history.    Currently doing fine.  Was hospitalized due to gastroparesis.  Doing much better now.  No bleeding, fever, chills, weight loss, night sweats    Past Medical History:   Diagnosis Date    Acute deep vein thrombosis (DVT) of popliteal vein of left lower extremity 03/24/2020    Anemia     Anti-phospholipid syndrome     On lifelong anticoagulation therapy    Bladder disorder     LEAKAGE   ON MED  wers pads    Bleeding hemorrhoids     Chronic kidney disease     Community acquired pneumonia     HISTORY OF IN 2014    Congestive heart failure     COPD (chronic obstructive pulmonary disease)     Deep venous thrombosis 2006, 2008    Left lower extremity multiple    Depression     Esophageal carcinoma 12/31/2014    had chemo and radiation prior surgery    Hemorrhoids     HH (hiatus hernia)     History of atrial fibrillation 2015    ONE EPISODE WHILE HOSPITALIZED    History of kidney stones     History of nephrolithiasis     History of pancreatitis     PT STATES MANY YEARS AGO    History of radiation therapy     History of transfusion     Hypertension     Long-term (current) use of anticoagulants, INR goal 2.0-3.0     Lymphedema     Malignant neoplasm of prostate     Other hyperlipidemia 01/30/2018 January 30, 2018 lipid panel risk 12.8%    Restless legs syndrome     Sleep apnea     OCCASIONALLY WEARS CPAP    Squamous carcinoma     on the head     Past Surgical History:   Procedure Laterality Date    APPENDECTOMY  1950    BRONCHOSCOPY      (Diagnostic)    CARDIAC CATHETERIZATION N/A 5/14/2022    Procedure: Left Heart Cath;  Surgeon: Eric So MD;  Location: [facility] INVASIVE LOCATION;  Service: Cardiology;  Laterality: N/A;    CARDIAC CATHETERIZATION N/A 5/14/2022    Procedure: Coronary angiography;   Surgeon: Eric So MD;  Location: [facility] CATH INVASIVE LOCATION;  Service: Cardiology;  Laterality: N/A;    CARDIAC CATHETERIZATION N/A 5/14/2022    Procedure: Left ventriculography;  Surgeon: Eric So MD;  Location: [facility] CATH INVASIVE LOCATION;  Service: Cardiology;  Laterality: N/A;    CATARACT EXTRACTION Bilateral 2014    COLONOSCOPY  12/15/2014    Complete / Description: EH, IH, torts, stool, follow-up colonoscopy due in 5 years.    COLONOSCOPY N/A 6/13/2017    non-thrombosed external hemorrhoids, normal examined ileum, IH    COLONOSCOPY N/A 2/26/2019    Procedure: COLONOSCOPY with Cold Polypectomy;  Surgeon: Mulugeta Millan MD;  Location: [facility] ENDOSCOPY;  Service: Gastroenterology    COLONOSCOPY N/A 12/16/2020    Procedure: COLONOSCOPY to cecum into TI;  Surgeon: Mesha Sanchez MD;  Location: [facility] ENDOSCOPY;  Service: Gastroenterology;  Laterality: N/A;  pre: lower GI bleed  post: hemorrhoids     CYSTOSCOPY W/ LASER LITHOTRIPSY      ENDOSCOPY N/A 6/13/2017    Procedure: ESOPHAGOGASTRODUODENOSCOPY WITH COLD BIOPSY;  Surgeon: Lake Gonzalez MD;  Location: [facility] ENDOSCOPY;  Service:     ENDOSCOPY N/A 11/18/2021    Procedure: ESOPHAGOGASTRODUODENOSCOPY WITH  DILATATION WITH FLUOROSCOPY;  Surgeon: Jose Christine III, MD;  Location: [facility] ENDOSCOPY;  Service: Gastroenterology;  Laterality: N/A;  Pre: dysphagia, h/x of adinocarcinoma of esophagus  Post: same    ENDOSCOPY N/A 8/7/2023    Procedure: ESOPHAGOGASTRODUODENOSCOPY;  Surgeon: Jameel Valencia MD;  Location: [facility] ENDOSCOPY;  Service: Gastroenterology;  Laterality: N/A;  PRE- DYSPHAGIA  POST-ABORTED DUE TO RETAINED FOOD    ENDOSCOPY N/A 8/8/2023    Procedure: ESOPHAGOGASTRODUODENOSCOPY with bx;  Surgeon: Jameel Valencia MD;  Location: [facility] ENDOSCOPY;  Service: Gastroenterology;  Laterality: N/A;  pre: N/V, abd pain  post: esophageal nodule, retained food    ESOPHAGECTOMY      April 2015, stage IIB  esophageal carcinoma, sub-total resection.    ESOPHAGECTOMY      Esophagectomy Subtotal Andrea Joe Procedure    EXCISION LESION  08/2012    Removal of Squamous Cell CA on Head    HAMMER TOE REPAIR  09/2014    Hammertoe Operation (Each Toe), 10/2014    HAMMER TOE REPAIR Left 10/3/2017    Procedure: Left second third and fourth distal interphalangeal joint resection with flexor tenotomy;  Surgeon: Mulugeta Lira MD;  Location: [facility] OR OSC;  Service:     HEMORRHOIDECTOMY N/A 12/23/2020    Procedure: HEMORRHOIDECTOMY;  Surgeon: Billy Julio Jr., MD;  Location: [facility] MAIN OR;  Service: General;  Laterality: N/A;    HERNIA REPAIR      incisional    JEJUNOSTOMY      Laparoscopic    JEJUNOSTOMY      tube removal     KNEE SURGERY Bilateral 1967, 1973, 1981    PATELLA SURGERY Left     removed    PILONIDAL CYST / SINUS EXCISION      OK ARTHRP KNE CONDYLE&PLATU MEDIAL&LAT COMPARTMENTS Right 3/26/2018    Procedure: TOTAL KNEE ARTHROPLASTY;  Surgeon: Renny Solis MD;  Location: [facility] MAIN OR;  Service: Orthopedics    PROSTATECTOMY  2010    PYLOROPLASTY      SIGMOIDOSCOPY N/A 8/2/2023    Procedure: SIGMOIDOSCOPY FLEXIBLE;  Surgeon: Mesha Sanchez MD;  Location: [facility] ENDOSCOPY;  Service: Gastroenterology;  Laterality: N/A;  PRE- ABNORMAL CT  POST- HEMORRHOIDS, ULCERATION    SPINAL FUSION  02/1998    C 5,6    TONSILLECTOMY      UPPER GASTROINTESTINAL ENDOSCOPY  12/15/2014    LA Grade D esophagitis, Pardo's, HH, multiple duodenal ulcers    VENTRAL/INCISIONAL HERNIA REPAIR N/A 4/14/2016    Procedure: VENTRAL/INCISIONAL HERNIA REPAIR, open, with mesh, and component separation;  Surgeon: Darren Rivas MD;  Location: [facility] MAIN OR;  Service:        HEMATOLOGIC/ONCOLOGIC HISTORY:  (History from previous dates can be found in the separate document.)    MEDICATIONS    Current Outpatient Medications:     albuterol sulfate  (90 Base) MCG/ACT inhaler, Inhale 1 puff Every 4 (Four) Hours As Needed for  Wheezing., Disp: , Rfl:     atorvastatin (LIPITOR) 40 MG tablet, Take 1 tablet by mouth Every Night., Disp: 90 tablet, Rfl: 3    bisacodyl (DULCOLAX) 5 MG EC tablet, Take 1 tablet by mouth Daily., Disp: 30 tablet, Rfl: 0    castor oil-balsam peru (VENELEX) ointment, Apply 1 application  topically to the appropriate area as directed Every 12 (Twelve) Hours., Disp: 56.7 g, Rfl: 0    cyanocobalamin 1000 MCG/ML injection, INJECT 1 ML INTRAMUSCULARLY ONCE EVERY 30 DAYS AS DIRECTED, Disp: 1 mL, Rfl: 11    Eliquis 5 MG tablet tablet, TAKE ONE TABLET BY MOUTH EVERY 12 HOURS, Disp: 60 tablet, Rfl: 1    fluconazole (Diflucan) 100 MG tablet, Take 2 tablets by mouth Daily for 14 days. 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 Indications: Cardiac Failure, Edema, Disp: , Rfl:     Gemtesa 75 MG tablet, Daily. Indications: Urinary Incontinence, Disp: , Rfl:     metoclopramide (Reglan) 5 MG tablet, Take 1 tablet by mouth 3 (Three) Times a Day Before Meals., Disp: 90 tablet, Rfl: 1    metoprolol tartrate (LOPRESSOR) 50 MG tablet, Take 1 tablet by mouth 2 (Two) Times a Day for 60 days., Disp: 60 tablet, Rfl: 1    pantoprazole (PROTONIX) 40 MG EC tablet, TAKE ONE TABLET BY MOUTH TWICE A DAY BEFORE A MEAL, Disp: 180 tablet, Rfl: 3    PARoxetine (PAXIL) 40 MG tablet, TAKE ONE TABLET BY MOUTH EVERY MORNING, Disp: 30 tablet, Rfl: 5    polyethylene glycol (MIRALAX) 17 g packet, Take 17 g by mouth Daily., Disp: 30 each, Rfl: 0    potassium chloride (MICRO-K) 10 MEQ CR capsule, Take 1 capsule by mouth Daily., Disp: 90 capsule, Rfl: 5    pramipexole (MIRAPEX) 1.5 MG tablet, TAKE ONE TABLET BY MOUTH TWICE A DAY, Disp: 180 tablet, Rfl: 1    sennosides-docusate (PERICOLACE) 8.6-50 MG per tablet, Take 2 tablets by mouth 2 (Two) Times a Day., Disp: 120 tablet, Rfl: 0    tamsulosin (FLOMAX) 0.4 MG capsule 24 hr capsule, Take 1 capsule by  "mouth Daily., Disp: 30 capsule, Rfl: 0    tiotropium bromide monohydrate (Spiriva Respimat) 2.5 MCG/ACT aerosol solution inhaler, Inhale 2 puffs Daily., Disp: 4 g, Rfl: 2    tiotropium bromide-olodaterol (Stiolto Respimat) 2.5-2.5 MCG/ACT aerosol solution inhaler, Inhale Daily. Indications: Chronic Bronchitis, Chronic Obstructive Lung Disease, Disp: , Rfl:     ALLERGIES:   No Known Allergies    SOCIAL HISTORY:       Social History     Socioeconomic History    Marital status:      Spouse name: Lena    Number of children: 0    Years of education: College   Tobacco Use    Smoking status: Former     Packs/day: 2.00     Years: 3.00     Pack years: 6.00     Types: Cigarettes     Quit date:      Years since quittin.6    Smokeless tobacco: Former     Types: Chew    Tobacco comments:     Caffeine - coffee and soda    Vaping Use    Vaping Use: Never used   Substance and Sexual Activity    Alcohol use: Yes     Alcohol/week: 3.0 standard drinks     Types: 1 Glasses of wine, 1 Cans of beer, 1 Shots of liquor per week     Comment: 2    Drug use: Never     Comment: hx marijuana use \"a long time ago\"    Sexual activity: Defer     Partners: Male         FAMILY HISTORY:  Family History   Problem Relation Age of Onset    Cerebral aneurysm Mother         cerebral artery aneurysm ( age 56)    Anxiety disorder Father     Suicide Attempts Father          of suicide    Cancer Father         bladder    Prostate cancer Brother 68    No Known Problems Brother     Pancreatic cancer Nephew     Colon cancer Neg Hx     Esophageal cancer Neg Hx     Dementia Neg Hx     Malig Hyperthermia Neg Hx        REVIEW OF SYSTEMS:    Review of Systems   Constitutional:  Negative for activity change.   HENT:  Negative for nosebleeds and trouble swallowing.    Respiratory:  Negative for wheezing.    Cardiovascular:  Negative for chest pain and palpitations.   Gastrointestinal:  Negative for constipation and diarrhea.   Genitourinary:  " "Negative for dysuria and hematuria.   Musculoskeletal:  Negative for arthralgias and myalgias.   Neurological:  Negative for seizures and syncope.   Hematological:  Negative for adenopathy. Does not bruise/bleed easily.   Psychiatric/Behavioral:  Negative for confusion.          Objective    Vitals:    08/15/23 1404   BP: 132/72   Pulse: 74   Resp: 16   Temp: 97.7 øF (36.5 øC)   TempSrc: Temporal   SpO2: 98%   Weight: 79.3 kg (174 lb 14.4 oz)   Height: 195 cm (76.77\")   PainSc: 0-No pain         8/15/2023     2:05 PM   Current Status   ECOG score 1      PHYSICAL EXAM:          CONSTITUTIONAL:  Vital signs reviewed.  No distress, looks comfortable.  EYES:  Conjunctiva and lids unremarkable.  PERRLA  EARS,NOSE,MOUTH,THROAT:  Ears and nose appear unremarkable.  Lips, teeth, gums appear unremarkable.  RESPIRATORY:  Normal respiratory effort.  Lungs clear to auscultation bilaterally.  CARDIOVASCULAR:  Normal S1, S2.  No murmurs rubs or gallops.  Significant bilateral lower extremity edema unchanged  GASTROINTESTINAL: Abdomen appears unremarkable.  Nontender.  No hepatomegaly.  No splenomegaly.  LYMPHATIC:  No cervical, supraclavicular, axillary lymphadenopathy.  SKIN:  Warm.  No rashes.  PSYCHIATRIC:  Normal judgment and insight.  Normal mood and affect.          RECENT LABS:        WBC   Date/Time Value Ref Range Status   08/15/2023 01:57 PM 4.62 3.40 - 10.80 10*3/mm3 Final   06/23/2023 02:51 PM 4.45 3.40 - 10.80 10*3/mm3 Final   04/16/2018 04:25 PM 4.23 (L) 4.5 - 11.0 10*3/uL Final     Hemoglobin   Date/Time Value Ref Range Status   08/15/2023 01:57 PM 10.3 (L) 13.0 - 17.7 g/dL Final   04/16/2018 04:25 PM 9.9 (L) 13.5 - 17.5 g/dL Final     Platelets   Date/Time Value Ref Range Status   08/15/2023 01:57  140 - 450 10*3/mm3 Final   04/16/2018 04:25  140 - 440 10*3/uL Final       Assessment/Plan     ASSESSMENT:  There are no diagnoses linked to this encounter.    *Esophageal adenocarcinoma. Initially T2N0M0. " After neoadjuvant carboplatin/Taxol with radiation, achieved a pathologic CR at resection, 4/24/2015.   As per NCCN guidelines, plan CAT scans every 6 months x1 year, then every 6 to 9 months on years 2 and years 3. Defer any surveillance EGDs to Dr. Gonzalez if he feels they are appropriate.   Also as per NCCN guidelines, plan H and P every 3 to 6 months on years 1 and years 2, then every 6 to 12 months' time on years 3 through years 5 and then annually.   EGD 6/13/17 by Dr. Gonzalez: No evidence of recurrence.  CT scan 3/2/18 (this completed 3 years of surveillance CT): No evidence of recurrence.  Plan no more surveillance CT.  EGD 11/18/2021, Dr. Christine: No evidence of recurrent cancer.  Dilated.  No signs of recurrence.  Remaining in remission.    *Recurrent DVT, prior to cancer diagnosis.    Dr. Bolton previously managed his Coumadin.   Chronic left leg larger than right, since DVT  Acute left popliteal, gastrocnemius DVT on 3/24/2020 despite INR 3.4.  Therefore, changed to Eliquis.  On 3/25/2020, unremarkable: Lupus anticoagulant, beta-2 glycoprotein antibodies.  Anticardiolipin antibodies also felt to be unremarkable with IgG and IgM both at 15 (negative is <15.  Indeterminate is 15-20).  No signs of recurrent DVT.  Continuing on Eliquis.  No bleeding    *CT angiogram chest 3/24/2020 (LLE acute DVT found 3/24/2020): Mild increased opacity LLL may represent developing infiltrate versus atelectasis.  Radiologist recommended follow-up.  CT chest 5/1/2020: Resolution of groundglass LLL infiltrate from the 3/24/2020 CT.  A few mildly enlarged mediastinal nodes are less prominent than on the 3/18/2020 CT.  No new abnormalities.  Plan no more follow-up CT scans.    *Persistent cough.  He has seen Dr. Maher Sayied for this.    He did not complain of this today.    *Previously complained of emesis occurring 5 hours after eating on average once every month or 2.  No nausea otherwise.  Denies dysphagia or odynophagia.     Unchanged.  Occurring on average once every couple of months.  He did not complain of this today    *Iron deficiency anemia  Hb mostly around 11  On 4/15/2019, ferritin 29.7, 13% saturation.  Serum folate normal.  On oral iron daily through PCP.  On 8/23/19, ferritin 65, 14%.   Increased oral iron.   On 10/15/19, ferritin 72, 10%.  On 10/25/2019, stopped oral iron since it was not helping.    Oral iron not effective due to poor absorption  2 doses Injectafer.  On 12/13/2019, ferritin 708, 15% saturation, Hb 10.4.  Therefore, no IV iron.  Monitor.  On 5/5/2020, ferritin 346, 15% saturation, Hb 9.9.  Therefore, no need for IV iron at this time.  On 7/7/2020, Hb up to 11.7.  Iron labs normal.  Admission 12/15/2020: Hb 6.6.  Ferritin 40, iron saturation 17%.  Discharged on 12/21/2020 with Hb 7.1, which fell from 7.9 on 12/18/2020, from 8.9 in the early morning 12/18/2020.  The drop in Hb was thought to be due to hemorrhoidal bleeding related to Eliquis.  Eliquis was stopped.  Hemorrhoidal repair planned by Dr. Flynn Julio after the holidays.  Was given Venofer 300 mg on 12/17/2020 by Dr. Ross of our practice  On 12/29/2020, ferritin 176, 13% saturation, Hb 8.4, reticulate hemoglobin low at 25.7.  Suspect he would benefit from some more iron.  Given 1 dose Injectafer.  On 2/2/2021, ferritin 317, 17% saturation, Hb 10.3.  Therefore, Hb gradually improved since the 1 dose of Injectafer.  3/2/2021: Ferritin iron 93, 11% saturation, Hb 11.9.  1 dose Injectafer.  3/23/2021: Ferritin 471, 20% saturation, Hb 10.8  7/6/2021: Ferritin 329, 21% saturation, Hb 10.8  9/28/2021: Ferritin 230, 20% saturation, Hb 11.4.  No need for IV iron.  12/28/2021: Ferritin 337, 6% iron saturation, Hb 10.4.  No IV iron given.  (Although saturation is low, ferritin is 337).  3/22/2022: Ferritin 112, 12% saturation, Hb 10.6.  Plan 1 dose Injectafer.  5/31/2022: Ferritin 473, 23% saturation, Hb 11.4  8/30/2022: Ferritin 308, 17% saturation, Hb  11.7.  No need for Injectafer  11/22/2022: Ferritin 268, 14% saturation.  Hb 11.9.  No need for Injectafer.  2/15/2023: Hb 11.  Ferritin 247, 19% saturation.  No need for Injectafer.  5/16/2023: Hb 12.  Ferritin 358, 15% saturation.  No need for Injectafer.  8/15/23: Hb 10.3    *Hemorrhoidal bleeding with Hb down to 6.6, requiring admission, 12/15/2020.  Thought to be related to Eliquis.  Eliquis was stopped.  Hemorrhoidal repair by Dr. Flynn Julio, 12/23/2020  Eliquis subsequently restarted with no more issues with bleeding.  1 episode of dark stools yesterday, 9/27/2021.  Told him if he notices more of this he should contact Dr. Sanchez.  3/22/2022: Denies any rectal bleeding.  States this has not really been an issue since the hemorrhoidal repair  8/30/2022: Denies bleeding  11/22/2022: Denies bleeding  5/16/2023: Denies bleeding  8/15/2023: Denies bleeding    *9/28/2021: Change in stool frequency.  Was having a bowel movement 3 times per week.  For the past week has been having 3 formed bowel movements per day.  I told him if this persists he should discuss with Dr. Sanchez.    *Source of iron deficiency.  Last colonoscopy 2/26/2019 by Dr. Millan.  Last EGD 6/13/2017 by Dr. Gonzalez.  Suspect he has an absorption issue due to previous esophageal surgery.    *B12 deficiency.  On 6/6/2019, B12 <150  On B12 injections through PCP.  B12 on 5/5/2020 normal at 694  B12 on 2/2/2021, 789  5/16/2023 B12 1124    *Anemia for reasons in addition to iron deficiency and B12 deficiency  Baseline Hb mostly 10.5-11.5.  On 5/5/2020, unremarkable: RBC folate, iron labs, B12, haptoglobin, TB, LDH, direct Maki.  Creatinine baseline is 0.9-1.2   Hb 9.9 on 5/5/2020  On 7/7/2020, Hb up to 11.7.  Return to prior follow-up plan.  On 2/2/2021, B12 and RBC folate unremarkable  3/23/2021: Hb 10.8 despite normal iron stores.  5/25/2021, Hb 10.7 with normal iron labs  7/6/2021, Hb 10.8 with normal iron labs  Hb 10.3, from 12, from 11, from  10.2, from 11.9, from 11.7, from 11.4    *Leukocytopenia  New issue on 3/23/2021, WBC 3.38, based on recent labs, but WBC has been as low as 2.9 December 2019  WBC 4.6, from 3.9, from 4.3, from 4.1, from 4.6, from 4    *Neutropenia  ANC 1590 on 3/23/2021 (previously ANC has been as low as 1480 with WBC in the low normal range)  ANC 2910, from 2150, from 2160, from 2110, from 2560, from 1690    *Thrombocytopenia  New issue on 3/23/2021, , based on recent labs, but PLT has been as low as 119 October 2019  , from 180, from 163, from 155, from 174, from 158    *Dyspnea on exertion x2 weeks on 11/22/2022 visit  Advised to discuss with his PCP and in the meantime if it worsens he should go to the ER    *Admitted early January 2023 for respiratory failure due to RSV.  Also had A-fib with RVR.    *He had a white blood cell count in the upper 3s and a hemoglobin in the upper 12s with a normal platelet count prior to beginning chemotherapy.     PLAN:  MD CBC stat ferritin, iron panel, reticulate hemoglobin, B12 level 3 months  (I offered 6-month follow-up but he prefers to maintain 3-month)  Baseline Hb when not in the hospital mostly 9.5-11.5  Continue Eliquis    hemorrhoids have been repaired.  Last drop of Hb was down to 7.1 on 12/21/2020.  (Hemorrhoidal bleeding)    Prior plan was:  MD every 6-month.  No more surveillance CT scans.  He does not have a port.    I have discussed with him if Hb drops and remains around 9 or so, we may want to plan a bone marrow biopsy

## 2023-08-15 NOTE — TELEPHONE ENCOUNTER
Patient called not answer. Left message on VM as per RADHA.   Advised to keep his appointment with Gladys on 08/17@3801. Advised to call back with questions.

## 2023-08-16 ENCOUNTER — HOME CARE VISIT (OUTPATIENT)
Dept: HOME HEALTH SERVICES | Facility: HOME HEALTHCARE | Age: 73
End: 2023-08-16
Payer: MEDICARE

## 2023-08-16 PROCEDURE — G0299 HHS/HOSPICE OF RN EA 15 MIN: HCPCS

## 2023-08-17 ENCOUNTER — OFFICE VISIT (OUTPATIENT)
Dept: INTERNAL MEDICINE | Age: 73
End: 2023-08-17
Payer: MEDICARE

## 2023-08-17 ENCOUNTER — HOME CARE VISIT (OUTPATIENT)
Dept: HOME HEALTH SERVICES | Facility: HOME HEALTHCARE | Age: 73
End: 2023-08-17
Payer: MEDICARE

## 2023-08-17 ENCOUNTER — OFFICE VISIT (OUTPATIENT)
Dept: GASTROENTEROLOGY | Facility: CLINIC | Age: 73
End: 2023-08-17
Payer: MEDICARE

## 2023-08-17 VITALS
HEART RATE: 86 BPM | OXYGEN SATURATION: 97 % | SYSTOLIC BLOOD PRESSURE: 122 MMHG | TEMPERATURE: 97.7 F | DIASTOLIC BLOOD PRESSURE: 64 MMHG | RESPIRATION RATE: 18 BRPM

## 2023-08-17 VITALS
WEIGHT: 179.2 LBS | DIASTOLIC BLOOD PRESSURE: 78 MMHG | TEMPERATURE: 98.4 F | OXYGEN SATURATION: 97 % | HEART RATE: 92 BPM | HEIGHT: 77 IN | BODY MASS INDEX: 21.16 KG/M2 | SYSTOLIC BLOOD PRESSURE: 136 MMHG

## 2023-08-17 VITALS
BODY MASS INDEX: 21.11 KG/M2 | SYSTOLIC BLOOD PRESSURE: 124 MMHG | TEMPERATURE: 98.7 F | HEIGHT: 77 IN | DIASTOLIC BLOOD PRESSURE: 58 MMHG | OXYGEN SATURATION: 96 % | HEART RATE: 65 BPM | WEIGHT: 178.8 LBS

## 2023-08-17 VITALS
TEMPERATURE: 96.8 F | DIASTOLIC BLOOD PRESSURE: 52 MMHG | HEART RATE: 63 BPM | OXYGEN SATURATION: 98 % | RESPIRATION RATE: 16 BRPM | SYSTOLIC BLOOD PRESSURE: 120 MMHG

## 2023-08-17 DIAGNOSIS — K21.9 GASTROESOPHAGEAL REFLUX DISEASE, UNSPECIFIED WHETHER ESOPHAGITIS PRESENT: ICD-10-CM

## 2023-08-17 DIAGNOSIS — K59.01 SLOW TRANSIT CONSTIPATION: ICD-10-CM

## 2023-08-17 DIAGNOSIS — Z98.890 H/O ESOPHAGECTOMY: ICD-10-CM

## 2023-08-17 DIAGNOSIS — Z85.01 HISTORY OF ESOPHAGEAL CANCER: ICD-10-CM

## 2023-08-17 DIAGNOSIS — B37.81 ESOPHAGEAL CANDIDIASIS: ICD-10-CM

## 2023-08-17 DIAGNOSIS — I49.9 IRREGULAR HEARTBEAT: ICD-10-CM

## 2023-08-17 DIAGNOSIS — Z90.49 H/O ESOPHAGECTOMY: ICD-10-CM

## 2023-08-17 DIAGNOSIS — K22.70 BARRETT'S ESOPHAGUS WITHOUT DYSPLASIA: ICD-10-CM

## 2023-08-17 DIAGNOSIS — C15.9 MALIGNANT NEOPLASM OF ESOPHAGUS, UNSPECIFIED LOCATION: Primary | ICD-10-CM

## 2023-08-17 DIAGNOSIS — K31.84 GASTROPARESIS: Primary | ICD-10-CM

## 2023-08-17 DIAGNOSIS — D63.8 ANEMIA OF CHRONIC DISEASE: ICD-10-CM

## 2023-08-17 PROCEDURE — 3078F DIAST BP <80 MM HG: CPT | Performed by: NURSE PRACTITIONER

## 2023-08-17 PROCEDURE — 1160F RVW MEDS BY RX/DR IN RCRD: CPT | Performed by: NURSE PRACTITIONER

## 2023-08-17 PROCEDURE — 99214 OFFICE O/P EST MOD 30 MIN: CPT | Performed by: NURSE PRACTITIONER

## 2023-08-17 PROCEDURE — G0151 HHCP-SERV OF PT,EA 15 MIN: HCPCS

## 2023-08-17 PROCEDURE — 1159F MED LIST DOCD IN RCRD: CPT | Performed by: NURSE PRACTITIONER

## 2023-08-17 PROCEDURE — 3075F SYST BP GE 130 - 139MM HG: CPT | Performed by: NURSE PRACTITIONER

## 2023-08-17 NOTE — CASE COMMUNICATION
THE FOLLOWING SUPPLIES WERE ORDERED THROUGH [employer] BY EMAIL WITH EUGENE THORNE  8/17/23:    [id number] LATEX CATH  BEDSIDE DRAIN BAGS  LEG BAGS    1 MONTH SUPPLY

## 2023-08-17 NOTE — PROGRESS NOTES
"    I N T E R N A L  M E D I C I N E  Yumi Grider, FAYE       ENCOUNTER DATE:  08/17/2023    Jose FITCH Hurtado / 73 y.o. / male        CC:   (Transitional Care Follow Up Visit)  Abdominal Pain        Within 48 business hours after discharge our office contacted him via telephone to coordinate his care and needs.      I reviewed and discussed the details of that call along with the discharge summary, hospital problems, inpatient lab results, inpatient diagnostic studies, and consultation reports with the patient.         8/10/2023     7:49 PM   Date of TCM Phone Call   [facility]   Date of Admission 8/5/2023   Date of Discharge 8/10/2023   Discharge Disposition Home or Self Care       Risk for Readmission (LACE) Score: 14 (8/10/2023  6:00 AM)            VITALS    Visit Vitals  /58 (BP Location: Right arm, Patient Position: Sitting, Cuff Size: Adult) Comment: HR WAS IRREGULAR   Pulse 65   Temp 98.7 øF (37.1 øC) (Temporal)   Ht 195 cm (76.77\")   Wt 81.1 kg (178 lb 12.8 oz)   SpO2 96%   BMI 21.33 kg/mý       BP Readings from Last 3 Encounters:   08/17/23 124/58   08/17/23 120/52   08/15/23 132/72     Wt Readings from Last 3 Encounters:   08/17/23 81.1 kg (178 lb 12.8 oz)   08/15/23 79.3 kg (174 lb 14.4 oz)   08/05/23 77.1 kg (170 lb)      Body mass index is 21.33 kg/mý.    HPI:     Date of admission/discharge: As noted above in CC  Hospital: [facility]   Principle Dx: Nausea, dehydration, weight loss,severe malnutrition  Secondary Dx: COPD, CKD, Stage 2, Chronic anticoagulation, History of DVT, gastroparesis, HTN, esophageal cancer  History prior to hospitalization: Presented to the ER for concerns of decreased appetite with decreased oral intake.  Reported ongoing nausea/ dry heaving, stomach cramping at home prior to arrival.    Evaluation/Treatment: He underwent EGD on August 7, 2023 which was aborted due to retained food.  Underwent repeat EGD on August 8, 2023 with showed large amount of food " in stomach, with recommended gastric emptying study as follow up.  This showed delayed transit time.  He was started on Reglan with symptom improvement ability to tolerate PO diet.  EGD biopsies showed esophageal candidiasis and he was prescribed 14 day course of fluconazole.  He has close follow up scheduled with GI office later today.      Accompanied by his wife to today's appointment.He reports he is eating, drinking, urinating well.  Tolerating 2 regular meals daily, along with frequent snacking.  Weight is up 4 pounds in last week.  Had 2 soft bowel movements yesterday.  Denies abdominal pain, cramping, fever, chills.      Incidentally, he reports his home health PT advised him that his heartbeat was irregular this morning.  Pt denies any chest pain, shortness of breath, palpitations or history of Afib.  Followed by cardiology, Dr. Maldonado.  Remains on Eliquis 5 mg BID.      Patient Care Team:  Brandin Bolton MD as PCP - General (Internal Medicine)  Tapan, George THORPE II, MD as Consulting Physician (Hematology and Oncology)  Jose Inman MD as Consulting Physician (Urology)  Mulugeta Millan MD as Consulting Physician (Gastroenterology)  Jose Christine III, MD as Referring Physician (Thoracic Surgery)  Seth Randhawa MD as Consulting Physician (Ophthalmology)  James Cyr MD as Consulting Physician (Cardiology)  Stephon Sommers MD as Consulting Physician (Sleep Medicine)  Rolanda Solomon MD as Consulting Physician (Nephrology)  ____________________________________________________________________    ASSESSMENT & PLAN:    1. Malignant neoplasm of esophagus, unspecified location    2. Irregular heartbeat      Orders Placed This Encounter   Procedures    Ambulatory Referral to Home Health    ECG 12 Lead       ECG 12 Lead    Date/Time: 8/17/2023 12:48 PM  Performed by: Yumi Grider APRN  Authorized by: Yumi Grider APRN   Rhythm: sinus rhythm  Ectopy: unifocal PVCs    Clinical impression:  poor quality tracing that precludes reliable interpretation - Repeat ECG          Summary/Discussion:  Agreeable to meet with home health nutritionist.    He will follow up with GI office this afternoon.  ECG showed PVCs but is ultimately of poor quality despite repeating multiple times, suspected due to presence of bladder implant.  He remains asymptomatic.  He was educated on importance of close follow up with cardiology.    No follow-ups on file.    ____________________________________________________________________    REVIEW OF SYSTEMS    Review of Systems   Constitutional:  Negative for chills, fever and unexpected weight change.   Respiratory:  Negative for cough, chest tightness and shortness of breath.    Cardiovascular:  Negative for chest pain, palpitations and leg swelling.   Gastrointestinal:  Negative for abdominal pain, diarrhea, nausea and vomiting.   Neurological:  Negative for dizziness, weakness, light-headedness and headaches.   Psychiatric/Behavioral:  The patient is not nervous/anxious.        PHYSICAL EXAMINATION    Physical Exam  Vitals reviewed.   Constitutional:       General: He is not in acute distress.     Appearance: Normal appearance. He is not ill-appearing, toxic-appearing or diaphoretic.   HENT:      Head: Normocephalic and atraumatic.   Cardiovascular:      Rate and Rhythm: Normal rate and regular rhythm.      Heart sounds: Normal heart sounds.      Comments: + PVCs  Pulmonary:      Effort: Pulmonary effort is normal.      Breath sounds: Normal breath sounds.   Abdominal:      Palpations: Abdomen is soft.      Tenderness: There is no abdominal tenderness.   Musculoskeletal:      Right lower leg: Edema present.      Left lower leg: Edema present.   Neurological:      Mental Status: He is alert and oriented to person, place, and time. Mental status is at baseline.   Psychiatric:         Mood and Affect: Mood normal.         Behavior: Behavior normal.         Thought Content: Thought  content normal.         Judgment: Judgment normal.         REVIEWED DATA:    Labs:   Lab Results   Component Value Date     08/10/2023    K 3.6 08/10/2023    CALCIUM 8.4 (L) 08/10/2023    AST 15 08/08/2023    ALT 9 08/08/2023    BUN 16 08/10/2023    CREATININE 1.31 (H) 08/10/2023    CREATININE 1.08 08/09/2023    CREATININE 1.04 08/08/2023    EGFRIFNONA 62 06/04/2021    EGFRIFAFRI 75 06/04/2021       Lab Results   Component Value Date    WBC 4.62 08/15/2023    HGB 10.3 (L) 08/15/2023    HGB 10.5 (L) 08/10/2023    HGB 10.7 (L) 08/09/2023     08/15/2023       Lab Results   Component Value Date    GLUCOSEU Negative 08/05/2023    BLOODU Negative 08/05/2023    NITRITEU Negative 08/05/2023    LEUKOCYTESUR Small (1+) (A) 08/05/2023       Imaging:   NM Gastric Emptying    Result Date: 8/9/2023  Narrative: NUCLEAR MEDICINE GASTRIC EMPTYING STUDY (FOUR HOUR PROTOCOL)  HISTORY: Male who is 73 years-old, with a history of nausea and vomiting with retained food on EGD.  Following the administration of 520 uCi of 99m technetium sulfur colloid mixed in a standard meal, routine analysis was performed. Imaging and measurements obtained 1 hour intervals through 4 hours.  Residual activity at two hours is 51%. Normal two hour residual is 60% or less.  Residual activity at three hours is 37%. Normal three hour residual is 30% or less.  Residual activity at 4 hours is 27%. Normal four hour residual is 10% or less.  Gastric empty half time measures 126 minutes. Normal range 80 to 120 minutes.      Impression: Delayed gastric emptying.  This report was finalized on 8/9/2023 2:17 PM by Dr. Darian Howell M.D.      CT Abdomen Pelvis With Contrast    Result Date: 7/29/2023  Narrative: CT ABDOMEN AND PELVIS WITH IV CONTRAST  HISTORY: Epigastric pain with nausea and vomiting for approximately 4 days.  TECHNIQUE:  CT includes axial imaging from the lung bases to the trochanters with intravenous contrast and without use of oral  contrast. Data reconstructed in coronal and sagittal planes. Radiation dose reduction techniques were utilized, including automated exposure control and exposure modulation based on body size.  COMPARISON: CT abdomen 03/29/2023.  FINDINGS: There has been esophagectomy with gastric pull-through and there is compressive atelectasis in the right lower lobe. There is a small loculated right pleural effusion with surrounding pleural thickening and this appears similar to the prior exam 03/29/2023. Left lung base appears clear. There is elevation of the right hemidiaphragm.  The liver, spleen, and pancreas appear within normal limits. There is mild left adrenal gland thickening and this is without change. The right adrenal gland appears within normal limits. Mild bilateral renal parenchymal thinning is present, greater on the left. There is mild bilateral hydronephrosis with dilatation of the extrarenal pelvises and this is new when compared to the exam 03/29/2023. Mild hydroureter is present. There are 3 mm bilateral lower pole nonobstructing renal stones and there is a 2-3 mm right upper pole renal nonobstructing stone. No evidence for obstructing stone. Moderate urinary bladder distention is present.  There is no bowel dilatation or evidence for bowel obstruction. There is wall thickening involving the rectum with perirectal stranding consistent with colitis/proctitis.  There is mild generalized stranding within the abdominal mesentery and there is mild body wall edema. Degenerative disc disease is present in the lumbar spine, greatest at L2-3 and L5-S1 where there is vacuum phenomena.      Impression: 1. Rectal wall thickening with surrounding stranding/inflammation consistent with focal colitis/proctitis. 2. Moderate distention of the urinary bladder. There is also hydroureter and mild hydronephrosis with distention of the renal pelvises without evidence for obstructing stone. Small nonobstructing renal stones are  present. 3. Previous esophagectomy with gastric pull-through. Chronic loculated right basal pleural effusion with surrounding pleural thickening, similar to prior exam 03/29/2023.  Radiation dose reduction techniques were utilized, including automated exposure control and exposure modulation based on body size.  This report was finalized on 7/29/2023 1:59 PM by Dr. Andrade Boo M.D.      XR Chest 1 View    Result Date: 8/5/2023  Narrative: AP CHEST  HISTORY: Nausea and fatigue  COMPARISON: 07/29/2023  FINDINGS: Stable changes of esophagectomy and gastric pull-through. Stable minimal right pleural effusion. No new infiltrates are seen in the left lung. Heart size stable. Postoperative changes of the cervical spine      Impression: Stable exam  This report was finalized on 8/5/2023 2:40 PM by Dr. Miguel Hogan M.D.      XR Chest 1 View    Result Date: 7/29/2023  Narrative: XR CHEST 1 VW-  HISTORY: Male who is 73 years-old,  nausea, vomiting, prior esophagectomy  TECHNIQUE: Frontal views of the chest  COMPARISON: 01/02/2023  FINDINGS: The heart size is normal. Pulmonary vasculature is unremarkable. Aorta is tortuous. Gastric pull-through procedure changes are apparent. No focal pulmonary consolidation. Minimal right pleural effusion is seen. No pneumothorax. Old right rib deformities are noted. No acute osseous process.      Impression: No focal pulmonary consolidation. Minimal right pleural effusion.  This report was finalized on 7/29/2023 12:35 PM by Dr. Sumanth Boyer M.D.        Medical Tests:        Summary of old records / correspondence / consultant report:   DC summary re: issues addressed on HPI    Request outside records:         MEDICATIONS   Current Outpatient Medications   Medication Sig Dispense Refill    albuterol sulfate  (90 Base) MCG/ACT inhaler Inhale 1 puff Every 4 (Four) Hours As Needed for Wheezing.      atorvastatin (LIPITOR) 40 MG tablet Take 1 tablet by mouth Every Night. 90  tablet 3    bisacodyl (DULCOLAX) 5 MG EC tablet Take 1 tablet by mouth Daily. 30 tablet 0    castor oil-balsam peru (VENELEX) ointment Apply 1 application  topically to the appropriate area as directed Every 12 (Twelve) Hours. 56.7 g 0    cyanocobalamin 1000 MCG/ML injection INJECT 1 ML INTRAMUSCULARLY ONCE EVERY 30 DAYS AS DIRECTED 1 mL 11    Eliquis 5 MG tablet tablet TAKE ONE TABLET BY MOUTH EVERY 12 HOURS 60 tablet 1    fluconazole (Diflucan) 100 MG tablet Take 2 tablets by mouth Daily for 14 days. 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 Indications: Cardiac Failure, Edema      Gemtesa 75 MG tablet Daily. Indications: Urinary Incontinence      metoclopramide (Reglan) 5 MG tablet Take 1 tablet by mouth 3 (Three) Times a Day Before Meals. 90 tablet 1    metoprolol tartrate (LOPRESSOR) 50 MG tablet Take 1 tablet by mouth 2 (Two) Times a Day for 60 days. 60 tablet 1    pantoprazole (PROTONIX) 40 MG EC tablet TAKE ONE TABLET BY MOUTH TWICE A DAY BEFORE A MEAL 180 tablet 3    PARoxetine (PAXIL) 40 MG tablet TAKE ONE TABLET BY MOUTH EVERY MORNING 30 tablet 5    polyethylene glycol (MIRALAX) 17 g packet Take 17 g by mouth Daily. 30 each 0    potassium chloride (MICRO-K) 10 MEQ CR capsule Take 1 capsule by mouth Daily. 90 capsule 5    pramipexole (MIRAPEX) 1.5 MG tablet TAKE ONE TABLET BY MOUTH TWICE A  tablet 1    sennosides-docusate (PERICOLACE) 8.6-50 MG per tablet Take 2 tablets by mouth 2 (Two) Times a Day. 120 tablet 0    tamsulosin (FLOMAX) 0.4 MG capsule 24 hr capsule Take 1 capsule by mouth Daily. 30 capsule 0    tiotropium bromide monohydrate (Spiriva Respimat) 2.5 MCG/ACT aerosol solution inhaler Inhale 2 puffs Daily. 4 g 2    tiotropium bromide-olodaterol (Stiolto Respimat) 2.5-2.5 MCG/ACT aerosol solution inhaler Inhale Daily. Indications: Chronic Bronchitis, Chronic Obstructive Lung Disease       No  current facility-administered medications for this visit.       Current outpatient and discharge medications have been reconciled for the patient.  Reviewed by: FAYE Jason         @MSK@

## 2023-08-17 NOTE — HOME HEALTH
"Subjective: "   I have another appointment today with my PCP   . I have been doing my exercises and I not sore.      "    Medication changes: no      Plan for next visit: Review poaitionong and posture, balance activites."

## 2023-08-17 NOTE — PROGRESS NOTES
Chief Complaint   Patient presents with    gastroparesis       HPI    Jose Hurtado is a  73 y.o. male here for a follow up visit for gastroparesis hospital follow-up.    This patient follows with Dr. Millan, new to me.    Past GI medical history pertinent for esophagectomy for esophageal cancer follows with Dr. Christine.    Patient was seen by our service earlier this month during hospital admission for nausea, vomiting and abdominal pain after eating. He underwent EGD on 8/7/2023, the first of which had to be aborted due to retained food.  He then underwent repeat EGD the next day which also showed large amount of food in the stomach, and gastric emptying study was recommended, with findings of delayed transit time.  He was started on Reglan with good improvement of symptoms and was able to tolerate p.o. diet.  Biopsies from EGD with findings c/w esophageal candidiasis and + for Barretts, for which fluconazole Rx prescribed.      On visit today he reports resolution of abdominal pain nausea and vomiting on low-dose Reglan.  He is taking 5 mg p.o. twice daily.  He is currently on fluconazole to resolve esophageal candidiasis.  He is also on Protonix 40 mg once daily.  Denies dysphagia or odynophagia.    Reports regular bowel movements titrating MiraLAX and stool softeners based on stool frequency and consistency.  Denies rectal pain or rectal bleeding.  His appetite has slowly improved.  He has gained approximately 5 pounds since hospital discharge.    Reviewed flex sig 8/2/2023 -2 ulcers in the rectum and rectosigmoid colon.  Biopsy contraindicated.  Nonbleeding internal hemorrhoids.    Past Medical History:   Diagnosis Date    Acute deep vein thrombosis (DVT) of popliteal vein of left lower extremity 03/24/2020    Anemia     Anti-phospholipid syndrome     On lifelong anticoagulation therapy    Bladder disorder     LEAKAGE   ON MED  wers pads    Bleeding hemorrhoids     Chronic kidney disease     Community  acquired pneumonia     HISTORY OF IN 2014    Congestive heart failure     COPD (chronic obstructive pulmonary disease)     Deep venous thrombosis 2006, 2008    Left lower extremity multiple    Depression     Esophageal carcinoma 12/31/2014    had chemo and radiation prior surgery    Hemorrhoids     HH (hiatus hernia)     History of atrial fibrillation 2015    ONE EPISODE WHILE HOSPITALIZED    History of kidney stones     History of nephrolithiasis     History of pancreatitis     PT STATES MANY YEARS AGO    History of radiation therapy     History of transfusion     Hypertension     Long-term (current) use of anticoagulants, INR goal 2.0-3.0     Lymphedema     Malignant neoplasm of prostate     Other hyperlipidemia 01/30/2018 January 30, 2018 lipid panel risk 12.8%    Restless legs syndrome     Sleep apnea     OCCASIONALLY WEARS CPAP    Squamous carcinoma     on the head       Past Surgical History:   Procedure Laterality Date    APPENDECTOMY  1950    BRONCHOSCOPY      (Diagnostic)    CARDIAC CATHETERIZATION N/A 5/14/2022    Procedure: Left Heart Cath;  Surgeon: Eric So MD;  Location:  [facility];  Service: Cardiology;  Laterality: N/A;    CARDIAC CATHETERIZATION N/A 5/14/2022    Procedure: Coronary angiography;  Surgeon: Eric So MD;  Location:  [facility];  Service: Cardiology;  Laterality: N/A;    CARDIAC CATHETERIZATION N/A 5/14/2022    Procedure: Left ventriculography;  Surgeon: Eric So MD;  Location:  [facility];  Service: Cardiology;  Laterality: N/A;    CATARACT EXTRACTION Bilateral 2014    COLONOSCOPY  12/15/2014    Complete / Description: EH, IH, torts, stool, follow-up colonoscopy due in 5 years.    COLONOSCOPY N/A 6/13/2017    non-thrombosed external hemorrhoids, normal examined ileum, IH    COLONOSCOPY N/A 2/26/2019    Procedure: COLONOSCOPY with Cold Polypectomy;  Surgeon: Mulugeta Millan MD;   Location: [facility] ENDOSCOPY;  Service: Gastroenterology    COLONOSCOPY N/A 12/16/2020    Procedure: COLONOSCOPY to cecum into TI;  Surgeon: Mesha Sanchez MD;  Location: [facility] ENDOSCOPY;  Service: Gastroenterology;  Laterality: N/A;  pre: lower GI bleed  post: hemorrhoids     CYSTOSCOPY W/ LASER LITHOTRIPSY      ENDOSCOPY N/A 6/13/2017    Procedure: ESOPHAGOGASTRODUODENOSCOPY WITH COLD BIOPSY;  Surgeon: Lake Gonzalez MD;  Location: [facility] ENDOSCOPY;  Service:     ENDOSCOPY N/A 11/18/2021    Procedure: ESOPHAGOGASTRODUODENOSCOPY WITH  DILATATION WITH FLUOROSCOPY;  Surgeon: Jose Christine III, MD;  Location: [facility] ENDOSCOPY;  Service: Gastroenterology;  Laterality: N/A;  Pre: dysphagia, h/x of adinocarcinoma of esophagus  Post: same    ENDOSCOPY N/A 8/7/2023    Procedure: ESOPHAGOGASTRODUODENOSCOPY;  Surgeon: Jameel Valencia MD;  Location: [facility] ENDOSCOPY;  Service: Gastroenterology;  Laterality: N/A;  PRE- DYSPHAGIA  POST-ABORTED DUE TO RETAINED FOOD    ENDOSCOPY N/A 8/8/2023    Procedure: ESOPHAGOGASTRODUODENOSCOPY with bx;  Surgeon: Jameel Valencia MD;  Location: [facility] ENDOSCOPY;  Service: Gastroenterology;  Laterality: N/A;  pre: N/V, abd pain  post: esophageal nodule, retained food    ESOPHAGECTOMY      April 2015, stage IIB esophageal carcinoma, sub-total resection.    ESOPHAGECTOMY      Esophagectomy Subtotal [place] Procedure    EXCISION LESION  08/2012    Removal of Squamous Cell CA on Head    HAMMER TOE REPAIR  09/2014    Hammertoe Operation (Each Toe), 10/2014    HAMMER TOE REPAIR Left 10/3/2017    Procedure: Left second third and fourth distal interphalangeal joint resection with flexor tenotomy;  Surgeon: Mulugeta Lira MD;  Location: [facility] OR OSC;  Service:     HEMORRHOIDECTOMY N/A 12/23/2020    Procedure: HEMORRHOIDECTOMY;  Surgeon: Billy Julio Jr., MD;  Location: [facility] MAIN OR;  Service: General;  Laterality: N/A;    HERNIA REPAIR      incisional    JEJUNOSTOMY       "Laparoscopic    JEJUNOSTOMY      tube removal     KNEE SURGERY Bilateral , ,     PATELLA SURGERY Left     removed    PILONIDAL CYST / SINUS EXCISION      WI ARTHRP KNE CONDYLE&PLATU MEDIAL&LAT COMPARTMENTS Right 3/26/2018    Procedure: TOTAL KNEE ARTHROPLASTY;  Surgeon: Renny Solis MD;  Location: [facility] MAIN OR;  Service: Orthopedics    PROSTATECTOMY      PYLOROPLASTY      SIGMOIDOSCOPY N/A 2023    Procedure: SIGMOIDOSCOPY FLEXIBLE;  Surgeon: Mesha Sanchez MD;  Location: [facility] ENDOSCOPY;  Service: Gastroenterology;  Laterality: N/A;  PRE- ABNORMAL CT  POST- HEMORRHOIDS, ULCERATION    SPINAL FUSION  1998    C 5,6    TONSILLECTOMY      UPPER GASTROINTESTINAL ENDOSCOPY  12/15/2014    LA Grade D esophagitis, Pardo's, HH, multiple duodenal ulcers    VENTRAL/INCISIONAL HERNIA REPAIR N/A 2016    Procedure: VENTRAL/INCISIONAL HERNIA REPAIR, open, with mesh, and component separation;  Surgeon: Darren Rivas MD;  Location: [facility] MAIN OR;  Service:        Scheduled Meds:     Continuous Infusions: No current facility-administered medications for this visit.      PRN Meds:     No Known Allergies    Social History     Socioeconomic History    Marital status:      Spouse name: Lena    Number of children: 0    Years of education: College   Tobacco Use    Smoking status: Former     Packs/day: 2.00     Years: 3.00     Pack years: 6.00     Types: Cigarettes     Quit date:      Years since quittin.6    Smokeless tobacco: Former     Types: Chew    Tobacco comments:     Caffeine - coffee and soda    Vaping Use    Vaping Use: Never used   Substance and Sexual Activity    Alcohol use: Yes     Alcohol/week: 3.0 standard drinks     Types: 1 Glasses of wine, 1 Cans of beer, 1 Shots of liquor per week     Comment: 2    Drug use: Never     Comment: hx marijuana use \"a long time ago\"    Sexual activity: Defer     Partners: Male       Family History   Problem Relation Age of " Onset    Cerebral aneurysm Mother         cerebral artery aneurysm ( age 56)    Anxiety disorder Father     Suicide Attempts Father          of suicide    Cancer Father         bladder    Prostate cancer Brother 68    No Known Problems Brother     Pancreatic cancer Nephew     Colon cancer Neg Hx     Esophageal cancer Neg Hx     Dementia Neg Hx     Malig Hyperthermia Neg Hx        Review of Systems   Constitutional:  Negative for appetite change and unexpected weight change.   HENT:  Negative for trouble swallowing.    Gastrointestinal: Negative.      Vitals:    23 1303   BP: 136/78   Pulse: 92   Temp: 98.4 øF (36.9 øC)   SpO2: 97%       Physical Exam  Constitutional:       Appearance: He is well-developed.   Abdominal:      General: Bowel sounds are normal. There is no distension.      Palpations: Abdomen is soft. There is no mass.      Tenderness: There is no abdominal tenderness. There is no guarding.      Hernia: No hernia is present.   Skin:     General: Skin is warm and dry.      Capillary Refill: Capillary refill takes less than 2 seconds.   Neurological:      Mental Status: He is alert and oriented to person, place, and time.   Psychiatric:         Behavior: Behavior normal.     Assessment    Diagnoses and all orders for this visit:    1. Gastroparesis (Primary)    2. Gastroesophageal reflux disease, unspecified whether esophagitis present    3. Esophageal candidiasis    4. 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 History of esophageal cancer  Overview:  Dx'ed : s/p surgery ([facility]), XRT, chemotherapy  *CBC (Code)      6. H/O esophagectomy    7. Slow transit constipation    8. Anemia of chronic disease       Plan    Very pleasant 73-year-old male seen today in hospital follow-up as summarized above.    Recommend continuation of low-dose Reglan 5 mg to twice daily in combination of gastroparesis diet.  Gastroparesis diet handout reviewed with the patient and family.  Nutritionist plan to  come to his home to elaborate on dietary modifications.    We did discuss the very rare (<1%) risk of irreversible tardive dyskinesia with reglan use and she knows to discontinue the medication and call me should any tremors, tongue rolling, etc, develop.    Continue daily PPI therapy.   Complete Diflucan as prescribed.    Stable constipation continue MiraLAX and stool softeners and titrate based on stool frequency and consistency which we discussed.    RTC 2 months call with questions or concerns in the interim.         FAYE Gaston  [facility]  [address] [place] [address]  Office: [phone]    I spent 30 minutes caring for Jose on this date of service. This time includes time spent by me in the following activities: preparing for the visit, reviewing tests, obtaining and/or reviewing a separately obtained history, performing a medically appropriate examination and/or evaluation , counseling and educating the patient/family/caregiver, ordering medications, tests, or procedures, documenting information in the medical record, independently interpreting results and communicating that information with the patient/family/caregiver and care coordination.

## 2023-08-18 ENCOUNTER — HOME CARE VISIT (OUTPATIENT)
Dept: HOME HEALTH SERVICES | Facility: HOME HEALTHCARE | Age: 73
End: 2023-08-18
Payer: MEDICARE

## 2023-08-21 ENCOUNTER — HOME CARE VISIT (OUTPATIENT)
Dept: HOME HEALTH SERVICES | Facility: HOME HEALTHCARE | Age: 73
End: 2023-08-21
Payer: MEDICARE

## 2023-08-21 VITALS
TEMPERATURE: 97 F | OXYGEN SATURATION: 98 % | RESPIRATION RATE: 16 BRPM | DIASTOLIC BLOOD PRESSURE: 60 MMHG | SYSTOLIC BLOOD PRESSURE: 116 MMHG | HEART RATE: 89 BPM

## 2023-08-21 PROCEDURE — G0153 HHCP-SVS OF S/L PATH,EA 15MN: HCPCS

## 2023-08-21 PROCEDURE — G0151 HHCP-SERV OF PT,EA 15 MIN: HCPCS

## 2023-08-21 NOTE — HOME HEALTH
"Subjective: "    I cannot sleep well at night      "    Medication changes:no      Plan for next visit: Review posture and balance"

## 2023-08-22 VITALS
OXYGEN SATURATION: 97 % | TEMPERATURE: 97.7 F | RESPIRATION RATE: 18 BRPM | SYSTOLIC BLOOD PRESSURE: 125 MMHG | DIASTOLIC BLOOD PRESSURE: 69 MMHG | HEART RATE: 83 BPM

## 2023-08-23 NOTE — HOME HEALTH
Eval Note    Patient's goal to not choke on his food     Services required to achieve goals:  dysphaiga to determine least restrictive diet.     Potential Issues for goal attainment: good    Problems identified:Patient needing goal to increase po intake and exercises to increase laryngeal and pharyngeal strength to decrease risk of aspiration.    Describe the Functional status and safety:  Patient  is aware of his deficts.    Describe any environmental issues: none     Any equipment needs    POC confirmed with Brandin Bolton MD    on date 8/22/23

## 2023-08-24 ENCOUNTER — HOME CARE VISIT (OUTPATIENT)
Dept: HOME HEALTH SERVICES | Facility: HOME HEALTHCARE | Age: 73
End: 2023-08-24
Payer: MEDICARE

## 2023-08-24 VITALS
HEART RATE: 87 BPM | DIASTOLIC BLOOD PRESSURE: 60 MMHG | TEMPERATURE: 97.3 F | OXYGEN SATURATION: 98 % | SYSTOLIC BLOOD PRESSURE: 122 MMHG | RESPIRATION RATE: 18 BRPM

## 2023-08-24 PROCEDURE — G0299 HHS/HOSPICE OF RN EA 15 MIN: HCPCS

## 2023-08-24 PROCEDURE — G0151 HHCP-SERV OF PT,EA 15 MIN: HCPCS

## 2023-08-25 ENCOUNTER — HOME CARE VISIT (OUTPATIENT)
Dept: HOME HEALTH SERVICES | Facility: HOME HEALTHCARE | Age: 73
End: 2023-08-25
Payer: MEDICARE

## 2023-08-25 VITALS
DIASTOLIC BLOOD PRESSURE: 72 MMHG | RESPIRATION RATE: 18 BRPM | OXYGEN SATURATION: 98 % | TEMPERATURE: 97.7 F | SYSTOLIC BLOOD PRESSURE: 124 MMHG | HEART RATE: 85 BPM

## 2023-08-25 VITALS
RESPIRATION RATE: 18 BRPM | DIASTOLIC BLOOD PRESSURE: 58 MMHG | HEART RATE: 76 BPM | OXYGEN SATURATION: 96 % | TEMPERATURE: 97.8 F | SYSTOLIC BLOOD PRESSURE: 118 MMHG

## 2023-08-25 PROCEDURE — G0153 HHCP-SVS OF S/L PATH,EA 15MN: HCPCS

## 2023-08-28 ENCOUNTER — TREATMENT (OUTPATIENT)
Age: 73
End: 2023-08-28
Payer: MEDICARE

## 2023-08-28 DIAGNOSIS — R29.898 DECREASED STRENGTH OF LOWER EXTREMITY: ICD-10-CM

## 2023-08-28 DIAGNOSIS — Z74.09 IMPAIRED FUNCTIONAL MOBILITY, BALANCE, GAIT, AND ENDURANCE: ICD-10-CM

## 2023-08-28 DIAGNOSIS — R29.898 OTHER SYMPTOMS AND SIGNS INVOLVING THE MUSCULOSKELETAL SYSTEM: Primary | ICD-10-CM

## 2023-08-28 DIAGNOSIS — R26.9 GAIT DISTURBANCE: ICD-10-CM

## 2023-08-28 NOTE — PROGRESS NOTES
Physical Therapy Re-Evaluation  [facility] Physical Therapy [facility]   [address] [place] [address]  P: [phone]       F: [phone]          Patient: Jose Hurtado   : 1950  Visit Diagnoses:     ICD-10-CM ICD-9-CM   1. Other symptoms and signs involving the musculoskeletal system  R29.898 729.89   2. Impaired functional mobility, balance, gait, and endurance  Z74.09 V49.89   3. Decreased strength of lower extremity  R29.898 729.89   4. Gait disturbance  R26.9 781.2     Referring practitioner: Brandin Bolton MD  Date of Initial Visit: Type: THERAPY  Noted: 2023  Today's Date: 2023  Patient seen for 9 sessions      Subjective:   Jose Hurtado reports:   Subjective Questionnaire: Oswestry: 50, WOMAC: 50/96  Clinical Progress: worse since recent hospitalization  Home Program Compliance: Yes  Treatment has included: therapeutic exercise, neuromuscular re-education, therapeutic activity, and gait training    Subjective   Patient reports he was in the hospital for several days and had some home health therapy after returning home.      Objective     Active Range of Motion      Lumbar   WFL    Left Hip   Flexion: 100 degrees   Extension: 14 (Lag) degrees   Abduction: 10 degrees   External rotation (90/90): 10 degrees   Internal rotation (90/90): 35 degrees      Right Hip   Flexion: 110 degrees   Extension: 12 (Lag) degrees   Abduction: 10 degrees   External rotation (90/90): 25 degrees   Internal rotation (90/90): 30 degrees     Left Knee   Flexion: 120 degrees  Extensor la degrees      Right Knee   Flexion: 125 degrees  Extensor la degrees      Strength/Myotome Testing      Left Hip   Planes of Motion   Flexion: 4-  Extension: 3-  Abduction: 3-  External rotation: 3+  Internal rotation: 3+     Right Hip   Planes of Motion   Flexion: 4-  Extension: 3-  Abduction: 3-  External rotation: 3+  Internal rotation: 3+     Left Knee   Flexion: 4-  Extension: 4-     Right  Knee   Flexion: 4  Extension: 4     Left Ankle/Foot   Dorsiflexion: 4-     Right Ankle/Foot   Dorsiflexion: 4     Tests      Left Hip   Modified Jose: Positive.      Right Hip   Modified Jose: Positive.      Ambulation   Weight-Bearing Status   Assistive device used: single point cane     Observational Gait   Gait: asymmetric   Decreased walking speed.      Quality of Movement During Gait   Trunk  Forward lean.      Functional Assessment   Squat   Left valgus, left tibial anterior translation beyond toes, right valgus and right tibial anterior translation beyond toes.        5XSTS: 18.87 seconds  TU.82 seconds    See Exercise, Manual, and Modality Logs for complete treatment.     Assessment/Plan  Patient returns to PT following hospitalization with regressed status and functional performance.  He has impaired functional mobility, decreased strength, joint contractures, impaired balance and increased fall risk.  He will benefit from skilled PT to address these deficits.  Progress toward previous goals: Not Met    Goal Review  Short Term Goals (4 wks):  1.  Patient will have bilateral hip extension to 0 degrees.  2.  Patient will have bilateral hip flexion to 125 degrees.  3.  Patient will demonstrate improved posture and mechanics with ambulation using the least restrictive AD.  4.  Patient will be independent in performance of HEP for carryover upon discharge from skilled PT services.     Long Term Goals (8 wks):  1.  Patient will have increased hip strength to 4+/5.  2.  Patient will have improved Oswestry score of 15/50 or better.  3.  Patient will have improved WOMAC score of 35/96 or better.  4.  Patient will demonstrate improved squat form to all for functional squat pattern to ease burden of sit to stand from chairs/commode.      Plan  Therapy options: will be seen for skilled therapy services  Planned modality interventions: cryotherapy and thermotherapy (hydrocollator packs)  Planned therapy  interventions: manual therapy, postural training, soft tissue mobilization, spinal/joint mobilization, strengthening, stretching, flexibility, functional ROM exercises, home exercise program, neuromuscular re-education, therapeutic activities, gait training, body mechanics training and abdominal trunk stabilization  Frequency: 3x week  Duration in visits: 8  Treatment plan discussed with: patient  Plan details: Pt was educated on the importance of their HEP and their current need for continued skilled physical therapy. Patients goals and potential limitations were discussed and pt is in agreement with current plan of care and treatment emphasis.       PT SIGNATURE: Mervat Ramachandran PT     License Number: [id number]  Electronically signed by Mervat Ramachandran PT, 23, 1:44 PM EDT    Certification Period: 2023 thru 2023  I certify that the therapy services are furnished while this patient is under my care.  The services outlined above are required by this patient, and will be reviewed every 90 days.    Signature: __________________________________    Brandin Bolton MD   NPI: [id number]    Please sign and return via fax to [phone]. Thank you, [facility].      Timed:         Manual Therapy:         mins  50491;     Therapeutic Exercise:    15     mins  82302;     Neuromuscular Wil:        mins  48824;    Therapeutic Activity:         mins  99178;     Gait Trainin     mins  87935;     Ultrasound:          mins  75602;    Ionto                                  mins  16307  Self Care                            mins  07600  Canalith Repos         mins  35242  Orthotic MGMT/Train         mins  61089    Un-Timed:  Electrical Stimulation:         mins  50189 ( );  Dry Needling:          mins  60352 self-pay;  Dry Needling:          mins  59811 self-pay  Traction          mins  41970  Low Eval          mins  44001  Mod Eval          mins  20600  High Eval                             mins  94303  Re-eval                        20    mins  01176    Timed Treatment:   23   mins   Total Treatment:     43   mins

## 2023-08-30 ENCOUNTER — TREATMENT (OUTPATIENT)
Age: 73
End: 2023-08-30
Payer: MEDICARE

## 2023-08-30 DIAGNOSIS — R29.898 OTHER SYMPTOMS AND SIGNS INVOLVING THE MUSCULOSKELETAL SYSTEM: Primary | ICD-10-CM

## 2023-08-30 DIAGNOSIS — R26.9 GAIT DISTURBANCE: ICD-10-CM

## 2023-08-30 DIAGNOSIS — R29.898 WEAKNESS OF BOTH HIPS: ICD-10-CM

## 2023-08-30 DIAGNOSIS — Z74.09 IMPAIRED FUNCTIONAL MOBILITY, BALANCE, GAIT, AND ENDURANCE: ICD-10-CM

## 2023-08-30 DIAGNOSIS — R29.898 DECREASED STRENGTH OF LOWER EXTREMITY: ICD-10-CM

## 2023-08-30 NOTE — PROGRESS NOTES
Physical Therapy Daily Treatment Note    [facility] PT - [facility]  [address]  [address]  [place] [address]     Patient: Jose Hurtado   : 1950  Referring practitioner: Brandin Bolton MD  Date of Initial Visit: Type: THERAPY  Noted: 2023  Today's Date: 2023  Patient seen for 10 sessions         Jose Hurtado reports: home health therapist worked on my leg strength and balance.  My balance has gone caput        Subjective     Objective   - ambulates in/out of clinic with straight cane and noted significant increased kyphosis and valgus postioning of bilateral LE  with decreased step and stride length  -difficulty maintaining upright, proper posture/trunk positioning with ambulation.    See Exercise, Manual, and Modality Logs for complete treatment.   Exercise rationale/ pain free exercise performance  Anatomy and structure of affected musculature  Posture/Postural awareness  Lumbar support/Thoracic support - towel roll  Sleeping positions with pillows    Verbal/Tactile cues to ensure correct exercise performance/technique        Assessment/Plan Compliant/Cooperative with rehab efforts this date.  Subjectively reports no increased symptoms or discomfort with therapeutic exercise today.  Able to perform increased exercise reps without increased  pain but fatigues easily with isolated mm exercise performance and benefits from frequent rest breaks.  Benefits from education and strategies to improve posture and ensure correct exercise performance.          Progress strengthening /stabilization /functional activity as symptoms allow as well as postural strengthening and gait training.            Timed:  Manual Therapy:         mins  81471;  Therapeutic Exercise:     28    mins  36756;     Neuromuscular Wil:        mins  77916;    Therapeutic Activity:      14    mins  36313;     Gait Training:           mins  66805;     Ultrasound:          mins  20368;    Self Care                     __12__ mins 69324  Untimed:  Electrical Stimulation:         mins  00842 ( );  Mechanical Traction:         mins  64736;     Timed Treatment:   54   mins   Total Treatment:    54    mins  Rey Sterling [facility]  Physical Therapist  Assistant  [id number]

## 2023-09-01 ENCOUNTER — TELEPHONE (OUTPATIENT)
Dept: INTERNAL MEDICINE | Age: 73
End: 2023-09-01
Payer: MEDICARE

## 2023-09-01 ENCOUNTER — TREATMENT (OUTPATIENT)
Age: 73
End: 2023-09-01
Payer: MEDICARE

## 2023-09-01 DIAGNOSIS — R26.9 GAIT DISTURBANCE: ICD-10-CM

## 2023-09-01 DIAGNOSIS — R29.898 DECREASED STRENGTH OF LOWER EXTREMITY: ICD-10-CM

## 2023-09-01 DIAGNOSIS — R29.898 OTHER SYMPTOMS AND SIGNS INVOLVING THE MUSCULOSKELETAL SYSTEM: Primary | ICD-10-CM

## 2023-09-01 DIAGNOSIS — Z74.09 IMPAIRED FUNCTIONAL MOBILITY, BALANCE, GAIT, AND ENDURANCE: ICD-10-CM

## 2023-09-01 DIAGNOSIS — I89.0 LYMPHEDEMA: Primary | ICD-10-CM

## 2023-09-01 NOTE — PROGRESS NOTES
Physical Therapy Treatment Note  [facility] Physical Therapy [place]   [address] [place] [address]  P: [phone]       F: [phone]      Patient: Jose Hurtado   : 1950  Treatment Diagnosis:     ICD-10-CM ICD-9-CM   1. Other symptoms and signs involving the musculoskeletal system  R29.898 729.89   2. Impaired functional mobility, balance, gait, and endurance  Z74.09 V49.89   3. Decreased strength of lower extremity  R29.898 729.89   4. Gait disturbance  R26.9 781.2     Referring practitioner: Brandin Bolton MD  Date of Initial Visit: Type: THERAPY  Noted: 2023  Today's Date: 2023  Patient seen for 11 sessions           Subjective   Patient reports he is feeling better today.    Objective     See Exercise, Manual, and Modality Logs for complete treatment.       Assessment/Plan  Patient performed program with progressed parameters for strengthening, mobility and dynamic stability.  He tolerated progression well with no adverse symptoms.  Benefits from cueing for technique and to prevent compensatory patterns.  He is able to correct posture easier with cueing.   Progress per Plan of Care and Progress strengthening /stabilization /functional activity           Timed:         Manual Therapy:         mins  70229;     Therapeutic Exercise:    30     mins  31513;     Neuromuscular Wil:    15    mins  91195;    Therapeutic Activity:          mins  48784;     Gait Training:           mins  96857;     Ultrasound:          mins  79474;    Ionto                                  mins  95115  Self Care                            mins  50692  Canalith Repos         mins  78181  Orthotic MGMT/Train         mins  46007    Un-Timed:  Electrical Stimulation:         mins  97778 ( );  Dry Needling:          mins  82637 self-pay;  Dry Needling:          mins  54026 self-pay  Traction          mins  91996      Timed Treatment:   45   mins   Total Treatment:     45   mins        PT  SIGNATURE: Mervat Ramachandran PT     License Number: [id number]  Electronically signed by Mervat Ramachandran PT, 09/01/23, 10:38 AM EDT

## 2023-09-05 ENCOUNTER — TELEPHONE (OUTPATIENT)
Age: 73
End: 2023-09-05

## 2023-09-05 NOTE — TELEPHONE ENCOUNTER
Caller: Jose Hurtado    Relationship: Self    What was the call regarding: PATIENT UNABLE TO MAKE TODAY'S APPT DUE TO CONFLICTING DR APPT

## 2023-09-08 ENCOUNTER — TELEPHONE (OUTPATIENT)
Age: 73
End: 2023-09-08

## 2023-09-11 ENCOUNTER — HOSPITAL ENCOUNTER (OUTPATIENT)
Dept: PHYSICAL THERAPY | Facility: HOSPITAL | Age: 73
Setting detail: THERAPIES SERIES
Discharge: HOME | End: 2023-09-11
Payer: MEDICARE

## 2023-09-11 DIAGNOSIS — I89.0 LYMPHEDEMA: Primary | ICD-10-CM

## 2023-09-11 PROCEDURE — 97162 PT EVAL MOD COMPLEX 30 MIN: CPT

## 2023-09-11 NOTE — THERAPY EVALUATION
Physical Therapy Lymphedema Initial Evaluation  [facility]     Patient Name: Jose Hurtado  : 1950  MRN: [id number]  Today's Date: 2023      Visit Date: 2023    Visit Dx:    ICD-10-CM ICD-9-CM   1. Lymphedema  I89.0 457.1       Patient Active Problem List   Diagnosis    History of esophageal cancer    Adenomatous polyp of colon    Malignant neoplasm of prostate    RLS (restless legs syndrome)    History of DVT (deep vein thrombosis)    Recurrent major depressive disorder, in partial remission    Hypertension    Anemia    Insomnia due to other mental disorder    Peripheral polyneuropathy    B12 deficiency    Lymphedema    Iron deficiency    Gastroparesis    History of recurrent deep vein thrombosis (DVT)    Tremor of both hands    Gastrointestinal hemorrhage    Acute blood loss anemia    Pancytopenia    Chronic venous hypertension due to DVT    Bleeding hemorrhoid    Malabsorption of iron    Iron deficiency anemia    History of esophageal cancer    Abnormal CT scan    Generalized weakness    Chronic anticoagulation    CKD (chronic kidney disease) stage 2, GFR 60-89 ml/min    NSTEMI (non-ST elevated myocardial infarction)    Bronchitis    Dyspnea    Elevated troponin    Atrial fibrillation    Chronic obstructive pulmonary disease    Congestive heart failure    Gait instability    Dark stools    Proctitis    Severe malnutrition    Other symptoms and signs concerning food and fluid intake    Weight loss    Dehydration        Past Medical History:   Diagnosis Date    Acute deep vein thrombosis (DVT) of popliteal vein of left lower extremity 2020    Anemia     Anti-phospholipid syndrome     On lifelong anticoagulation therapy    Bladder disorder     LEAKAGE   ON MED  wers pads    Bleeding hemorrhoids     Chronic kidney disease     Community acquired pneumonia     HISTORY OF IN     Congestive heart failure     COPD (chronic obstructive pulmonary disease)     Deep venous thrombosis ,  2008    Left lower extremity multiple    Depression     Esophageal carcinoma 12/31/2014    had chemo and radiation prior surgery    Gastroparesis     Hemorrhoids     HH (hiatus hernia)     History of atrial fibrillation 2015    ONE EPISODE WHILE HOSPITALIZED    History of kidney stones     History of nephrolithiasis     History of pancreatitis     PT STATES MANY YEARS AGO    History of radiation therapy     History of transfusion     Hypertension     Long-term (current) use of anticoagulants, INR goal 2.0-3.0     Lymphedema     Malignant neoplasm of prostate     Other hyperlipidemia 01/30/2018 January 30, 2018 lipid panel risk 12.8%    Restless legs syndrome     Sleep apnea     OCCASIONALLY WEARS CPAP    Squamous carcinoma     on the head        Past Surgical History:   Procedure Laterality Date    APPENDECTOMY  1950    BRONCHOSCOPY      (Diagnostic)    CARDIAC CATHETERIZATION N/A 5/14/2022    Procedure: Left Heart Cath;  Surgeon: Eric So MD;  Location:  [facility];  Service: Cardiology;  Laterality: N/A;    CARDIAC CATHETERIZATION N/A 5/14/2022    Procedure: Coronary angiography;  Surgeon: Eric So MD;  Location:  [facility];  Service: Cardiology;  Laterality: N/A;    CARDIAC CATHETERIZATION N/A 5/14/2022    Procedure: Left ventriculography;  Surgeon: Eric So MD;  Location:  [facility];  Service: Cardiology;  Laterality: N/A;    CATARACT EXTRACTION Bilateral 2014    COLONOSCOPY  12/15/2014    Complete / Description: EH, IH, torts, stool, follow-up colonoscopy due in 5 years.    COLONOSCOPY N/A 6/13/2017    non-thrombosed external hemorrhoids, normal examined ileum, IH    COLONOSCOPY N/A 2/26/2019    Procedure: COLONOSCOPY with Cold Polypectomy;  Surgeon: Mulugeta Millan MD;  Location: [facility] ENDOSCOPY;  Service: Gastroenterology    COLONOSCOPY N/A 12/16/2020    Procedure: COLONOSCOPY to cecum into TI;   Surgeon: Mesha Sanchez MD;  Location: [facility] ENDOSCOPY;  Service: Gastroenterology;  Laterality: N/A;  pre: lower GI bleed  post: hemorrhoids     CYSTOSCOPY W/ LASER LITHOTRIPSY      ENDOSCOPY N/A 6/13/2017    Procedure: ESOPHAGOGASTRODUODENOSCOPY WITH COLD BIOPSY;  Surgeon: Lake Gonzalez MD;  Location: [facility] ENDOSCOPY;  Service:     ENDOSCOPY N/A 11/18/2021    Procedure: ESOPHAGOGASTRODUODENOSCOPY WITH  DILATATION WITH FLUOROSCOPY;  Surgeon: Jose Christine III, MD;  Location: [facility] ENDOSCOPY;  Service: Gastroenterology;  Laterality: N/A;  Pre: dysphagia, h/x of adinocarcinoma of esophagus  Post: same    ENDOSCOPY N/A 8/7/2023    Procedure: ESOPHAGOGASTRODUODENOSCOPY;  Surgeon: Jameel Valencia MD;  Location: [facility] ENDOSCOPY;  Service: Gastroenterology;  Laterality: N/A;  PRE- DYSPHAGIA  POST-ABORTED DUE TO RETAINED FOOD    ENDOSCOPY N/A 8/8/2023    Procedure: ESOPHAGOGASTRODUODENOSCOPY with bx;  Surgeon: Jameel Valencia MD;  Location: [facility] ENDOSCOPY;  Service: Gastroenterology;  Laterality: N/A;  pre: N/V, abd pain  post: esophageal nodule, retained food    ESOPHAGECTOMY      April 2015, stage IIB esophageal carcinoma, sub-total resection.    ESOPHAGECTOMY      Esophagectomy Subtotal [place] Joe Procedure    EXCISION LESION  08/2012    Removal of Squamous Cell CA on Head    HAMMER TOE REPAIR  09/2014    Hammertoe Operation (Each Toe), 10/2014    HAMMER TOE REPAIR Left 10/3/2017    Procedure: Left second third and fourth distal interphalangeal joint resection with flexor tenotomy;  Surgeon: Mulugeta Lira MD;  Location: [facility] OR OSC;  Service:     HEMORRHOIDECTOMY N/A 12/23/2020    Procedure: HEMORRHOIDECTOMY;  Surgeon: Billy Julio Jr., MD;  Location: [facility] MAIN OR;  Service: General;  Laterality: N/A;    HERNIA REPAIR      incisional    JEJUNOSTOMY      Laparoscopic    JEJUNOSTOMY      tube removal     KNEE SURGERY Bilateral 1967, 1973, 1981    PATELLA SURGERY Left     removed     PILONIDAL CYST / SINUS EXCISION      MI ARTHRP KNE CONDYLE&PLATU MEDIAL&LAT COMPARTMENTS Right 3/26/2018    Procedure: TOTAL KNEE ARTHROPLASTY;  Surgeon: Renny Solis MD;  Location: [facility] MAIN OR;  Service: Orthopedics    PROSTATECTOMY  2010    PYLOROPLASTY      SIGMOIDOSCOPY N/A 8/2/2023    Procedure: SIGMOIDOSCOPY FLEXIBLE;  Surgeon: Mesha Sanchez MD;  Location: [facility] ENDOSCOPY;  Service: Gastroenterology;  Laterality: N/A;  PRE- ABNORMAL CT  POST- HEMORRHOIDS, ULCERATION    SPINAL FUSION  02/1998    C 5,6    TONSILLECTOMY      UPPER GASTROINTESTINAL ENDOSCOPY  12/15/2014    LA Grade D esophagitis, Pardo's, HH, multiple duodenal ulcers    VENTRAL/INCISIONAL HERNIA REPAIR N/A 4/14/2016    Procedure: VENTRAL/INCISIONAL HERNIA REPAIR, open, with mesh, and component separation;  Surgeon: Darren Rivas MD;  Location: [facility] MAIN OR;  Service:        Visit Dx:    ICD-10-CM ICD-9-CM   1. Lymphedema  I89.0 457.1        Patient History       Row Name 09/11/23 0900             History    Chief Complaint Swelling;Tightness  -KA      Date Current Problem(s) Began 08/11/23  -KA      Brief Description of Current Complaint Pt is a past patient of the lymphedema clinic, has been seen several times over the past few years.  He has traditionally done very well with CDT treatment.  He has been compliant with compression stockings since last visit, but has not replaced them which could be what is causing increase in swelling over the past month.  He denies wounds and infections, but has had some dryness and intermittent redness of legs.  -KA      Previous treatment for THIS PROBLEM Rehabilitation  -KA      Patient/Caregiver Goals Decrease swelling  -KA      How has patient tried to help current problem? Uses compression pump, uses compression stockings (has not replaced recently)  -KA         Pain     Pain Location Leg  -KA      Pain at Present 0  -KA      Is your sleep disturbed? No  -KA      Difficulties with  "ADL's? Difficuty with fit of clothing, difficulty walking and car transfers due to heaviness of legs  -KA         Fall Risk Assessment    Any falls in the past year: No  -KA         Services    Prior Rehab/Home Health Experiences Yes  -KA      Are you currently receiving Home Health services No  -KA      Do you plan to receive Home Health services in the near future No  -KA         Daily Activities    Primary Language English  -KA      Are you able to read Yes  -KA      Are you able to write Yes  -KA      How does patient learn best? Reading  -KA      Teaching needs identified Home Exercise Program;Management of Condition  -KA      Patient is concerned about/has problems with Difficulty with self care (i.e. bathing, dressing, toileting:;Performing home management (household chores, shopping, care of dependents)  -KA      Does patient have problems with the following? None  -KA      Barriers to learning None  -KA      Pt Participated in POC and Goals Yes  -KA         Safety    Are you being hurt, hit, or frightened by anyone at home or in your life? No  -KA      Are you being neglected by a caregiver No  -KA                User Key  (r) = Recorded By, (t) = Taken By, (c) = Cosigned By      Initials Name Provider Type    Rubina Amaya, PT Physical Therapist                     Lymphedema       Row Name 09/11/23 0900             Subjective Pain    Able to rate subjective pain? yes  -KA      Pre-Treatment Pain Level 0  -KA         Subjective Comments    Subjective Comments Pt reports \"I'm still using my pump and wearing my stockings, but my legs have swelled back up over the past month and are not going back down.  -KA         Lymphedema Assessment    Lymphedema Classification RLE:;LLE:;secondary;stage 2 (Spontaneously Irreversible)  -KA      Infections or Cellulitis? no  -KA      Lymphedema Assessment Comments Moderate lymphedema affecting BLE below the knee, worse on LLE  -KA         LLIS - Physical Concerns    " "The amount of pain associated with my lymphedema is: 1  -KA      The amount of limb heaviness associated with my lymphedema is: 2  -KA      The amount of skin tightness associated with my lymphedema is: 3  -KA      The size of my swollen limb(s) seems: 4  -KA      Lymphedema affects the movement of my swollen limb(s): 2  -KA      The strength in my swollen limb(s) is: 2  -KA         LLIS - Psychosocial Concerns    Lymphedema affects my body image (i.e., \"how I think I look\"). 2  -KA      Lymphedema affects my socializing with others. 1  -KA      Lymphedema affects my intimate relations with spouse or partner (rate 0 if not applicable 1  -KA      Lymphedema \"gets me down\" (i.e., depression, frustration, or anger) 1  -KA      I must rely on others for help due to my lymphedema. 1  -KA      I know what to do to manage my lymphedema 2  -KA         LLIS - Functional Concerns    Lymphedema affects my ability to perform self-care activities (i.e. eating, dressing, hygiene) 1  -KA      Lymphedema affects my ability to perform routine home or work-related activities. 2  -KA      Lymphedema affects my performance of preferred leisure activities. 2  -KA      Lymphedema affects proper fit of clothing/shoes 3  -KA      Lymphedema affects my sleep 2  -KA         Posture/Observations    Posture/Observations Comments Ambulates slowly, using SC, extremely flexed posture, severe genu valgus  -KA         General ROM    GENERAL ROM COMMENTS BLE ROM grossly WFLs except lacking hip extension  -KA         MMT (Manual Muscle Testing)    General MMT Comments BLE strength at least 3/5  -KA         Lymphedema Edema Assessment    Ptting Edema Category By grade out of 4  -KA      Pitting Edema + 2/4;+ 3/4  -KA      Stemmer Sign positive;bilateral:  -KA      Edema Assessment Comment Moderate lymphedema present in BLE from foot to knee, worse on LLE  -KA         Skin Changes/Observations    Skin Observations Comment Light redness BLE, no warmth.  " Dry skin throughout LE, but one severe dry spot R anterior shin with redness.  Also has several patches of redness/dryness around toes  -KA         Lymphedema Measurements    Measurement Type(s) Circumferential  -KA      Circumferential Areas Lower extremities  -KA         BLE Circumferential (cm)    Measurement Location 1 Knee  -KA      Left 1 39.5 cm  -KA      Right 1 39 cm  -KA      Measurement Location 2 10cm below knee  -KA      Left 2 42.8 cm  -KA      Right 2 42 cm  -KA      Measurement Location 3 20cm below knee  -KA      Left 3 48 cm  -KA      Right 3 43.5 cm  -KA      Measurement Location 4 30cm below knee  -KA      Left 4 39.8 cm  -KA      Right 4 36 cm  -KA      Measurement Location 5 Ankle  -KA      Left 5 34 cm  -KA      Right 5 31.4 cm  -KA      Measurement Location 6 Midfoot  -KA      Left 6 29.2 cm  -KA      Right 6 26.6 cm  -KA      LLE Circumferential Total 233.3 cm  -KA      RLE Circumferential Total 218.5 cm  -KA         Lymphedema Life Impact Scale Totals    A.  Total Q1 - Q17 (Do not include Q18) 32  -KA      B.  Total number of questions answered (Q1-Q17) 17  -KA      C. Divide A by B 1.88  -KA      D. Multiple C by 25 47  -KA                User Key  (r) = Recorded By, (t) = Taken By, (c) = Cosigned By      Initials Name Provider Type    Rubina Amaya, PT Physical Therapist                                    Therapy Education  Education Details: Reviewed that compression garments must be replaced every 6 months as they lose stretch over time; if he has been compliant with use, this is likely the cause of his swelling over the past month.  Recommending brief CDT treatment for 2 weeks to get flare up under control, then return to long term compression using new Mediven Dual Layer stockings 30-40 mmHG since he is still doing okay with them.  Educated pt that he cannot receive more that one physical therapy treatment per day as he is currently receiving outpatient therapy; he prefers to  "have daily treatment for 2 weeks rather than spreading visits out over time as this resolved his edema last episode of care.  Pt to bring all bandaging supplies to clinic next visit as well as a card for payment if he needs additional bandaging supplies.  Given: Edema management, Bandaging/dressing change  Program: New  How Provided: Verbal  Provided to: Patient  Level of Understanding: Verbalized       OP Exercises       Row Name 09/11/23 0900             Subjective Comments    Subjective Comments Pt reports \"I'm still using my pump and wearing my stockings, but my legs have swelled back up over the past month and are not going back down.  -         Subjective Pain    Able to rate subjective pain? yes  -KA      Pre-Treatment Pain Level 0  -                User Key  (r) = Recorded By, (t) = Taken By, (c) = Cosigned By      Initials Name Provider Type    Rubina Amaya, PT Physical Therapist                                 PT OP Goals       Row Name 09/11/23 1000          PT Short Term Goals    STG Date to Achieve 09/25/23  -KA     STG 1 Pt demo decreased net edema of >/=10-20cm for decreased edema symptoms, decreased risk of infection, and improved skin care.  -KA     STG 1 Progress New  -     STG 2 Pt will purchase new compression garments as indicated for his condition, based on current girth measurements.  -     STG 2 Progress New  -        Long Term Goals    LTG Date to Achieve 10/02/23  -KA     LTG 1 Pt/family independent with compression garments as indicated for self-management of condition.  -     LTG 1 Progress New  -        Time Calculation    PT Goal Re-Cert Due Date 12/10/23  -               User Key  (r) = Recorded By, (t) = Taken By, (c) = Cosigned By      Initials Name Provider Type    Rubina Amaya, PT Physical Therapist                     PT Assessment/Plan       Row Name 09/11/23 1025          PT Assessment    Functional Limitations Limitation in home " management;Performance in leisure activities;Performance in self-care ADL  -KA     Impairments Edema;Gait;Impaired lymphatic circulation;Integumentary integrity;Posture  -KA     Assessment Comments 73 y.o. male who presents with bilateral Lower Extremity lymphedema.  Presentation is consistent with secondary Stage 2. Lymphedema is present from feet to knees and is characterized by 2+ to 3+ pitting edema, + B stemmer sign, redness, dry skin, beginnings of skin lesions due to dry skin.  Impairments include gait deviations, BLE weakness, decreased skin integrity, and edema.  Pt is limited with fit of clothing and shoes as well as some negative effect on walking and car transfers from leg heaviness due to lymphedema.  Score on Lymphedema Life Impact Scale is 47.  Patient is a good candidate for complete decongestive therapy to reduce infection risk, improve skin condition, and promote self-management of condition.  He has received CDT treatment in the past with good results, anticipate that this flare up will resolve within 2 weeks with proper treatment.  Pt aware that he will need to purchase new compression garments when legs have reduced as his are overdue for replacement.  -KA     Rehab Potential Good  -KA     Patient/caregiver participated in establishment of treatment plan and goals Yes  -KA     Patient would benefit from skilled therapy intervention Yes  -KA        PT Plan    PT Frequency 5x/week  -KA     Predicted Duration of Therapy Intervention (PT) 2-3 weeks  -KA     Planned CPT's? PT EVAL MOD COMPLELITY: 39922;PT RE-EVAL: 16054;PT THER PROC EA 15 MIN: 90348;PT THER ACT EA 15 MIN: 91742;PT MANUAL THERAPY EA 15 MIN: 09875;PT NEUROMUSC RE-EDUCATION EA 15 MIN: 33738;PT GAIT TRAINING EA 15 MIN: 80612;PT SELF CARE/HOME MGMT/TRAIN EA 15: 93841  -KA     Physical Therapy Interventions (Optional Details) balance training;home exercise program;manual lymphatic drainage;manual therapy techniques;patient/family education   -DEVONTE     PT Plan Comments Begin CDT.  -DEVONTE               User Key  (r) = Recorded By, (t) = Taken By, (c) = Cosigned By      Initials Name Provider Type    Rubina Amaya, PT Physical Therapist                                 Time Calculation:   Start Time: 0930  Stop Time: 1008  Time Calculation (min): 38 min  Untimed Charges  PT Eval/Re-eval Minutes: 38  Total Minutes  Untimed Charges Total Minutes: 38   Total Minutes: 38   Therapy Charges for Today       Code Description Service Date Service Provider Modifiers Qty    94294081286 HC PT EVAL MOD COMPLEXITY 3 9/11/2023 Rubina Powers, MANDA GP, KX 1                      Rubina Powers, PT  9/11/2023

## 2023-09-12 ENCOUNTER — HOSPITAL ENCOUNTER (OUTPATIENT)
Dept: PHYSICAL THERAPY | Facility: HOSPITAL | Age: 73
Setting detail: THERAPIES SERIES
Discharge: HOME | End: 2023-09-12
Payer: MEDICARE

## 2023-09-12 DIAGNOSIS — I89.0 LYMPHEDEMA: Primary | ICD-10-CM

## 2023-09-12 PROCEDURE — 97140 MANUAL THERAPY 1/> REGIONS: CPT

## 2023-09-12 NOTE — THERAPY TREATMENT NOTE
Outpatient Physical Therapy Lymphedema Treatment Note  [facility]     Patient Name: Jose Hurtado  : 1950  MRN: [id number]  Today's Date: 2023        Visit Date: 2023    Visit Dx:    ICD-10-CM ICD-9-CM   1. Lymphedema  I89.0 457.1       Patient Active Problem List   Diagnosis    History of esophageal cancer    Adenomatous polyp of colon    Malignant neoplasm of prostate    RLS (restless legs syndrome)    History of DVT (deep vein thrombosis)    Recurrent major depressive disorder, in partial remission    Hypertension    Anemia    Insomnia due to other mental disorder    Peripheral polyneuropathy    B12 deficiency    Lymphedema    Iron deficiency    Gastroparesis    History of recurrent deep vein thrombosis (DVT)    Tremor of both hands    Gastrointestinal hemorrhage    Acute blood loss anemia    Pancytopenia    Chronic venous hypertension due to DVT    Bleeding hemorrhoid    Malabsorption of iron    Iron deficiency anemia    History of esophageal cancer    Abnormal CT scan    Generalized weakness    Chronic anticoagulation    CKD (chronic kidney disease) stage 2, GFR 60-89 ml/min    NSTEMI (non-ST elevated myocardial infarction)    Bronchitis    Dyspnea    Elevated troponin    Atrial fibrillation    Chronic obstructive pulmonary disease    Congestive heart failure    Gait instability    Dark stools    Proctitis    Severe malnutrition    Other symptoms and signs concerning food and fluid intake    Weight loss    Dehydration         Lymphedema       Row Name 23 1400             Subjective Pain    Able to rate subjective pain? yes  -KD      Pre-Treatment Pain Level 0  -KD         Subjective    Subjective Comments States he brought old supplies with him, but feels it's probably time to purchase new supplies.  -KD         Manual Lymphatic Drainage    Manual Lymphatic Drainage Comments Short neck, B axilla, B IA, diaphragmatic breathing, L inguinal, LLE  -KD         Compression/Skin Care     Compression/Skin Care compression garment;skin care;wrapping location;bandaging  -KD      Skin Care moisturizing lotion applied  Eucerin  -KD      Wrapping Location lower extremity  -KD      Wrapping Location LE bilateral:;foot to knee  -KD      Bandage Layers cotton liner;padding/fluff layer;short-stretch bandages (comment size/quantity)  -KD      Bandaging Comments Tg9, Artiflex x 2  right foot to knee, Artiflex x 1 left foot & ankle/Rosidal Soft left ankle to knee, Rosidal K 3-10cm & 1-12cm to each leg, hospital socks, pt's shoes  -KD      Bandaging Technique circumferential/spiral;moderate compression;light compression  light to mod compression  -KD      Remove Bandages Advised pt to remove bandaging prior to appt tomorrow and wash legs if able  -KD                User Key  (r) = Recorded By, (t) = Taken By, (c) = Cosigned By      Initials Name Provider Type    Ceci Esposito, PT Physical Therapist                                   PT Assessment/Plan       Row Name 09/12/23 1432          PT Assessment    Assessment Comments Pt starting back into treatment today. Performed MLD to include LLE and compression bandaging bilat feet to knees. Pt is quite used to being bandaged, felt comfortable when he left clinic.  -KD        PT Plan    PT Plan Comments See pt for CDT.  -KD               User Key  (r) = Recorded By, (t) = Taken By, (c) = Cosigned By      Initials Name Provider Type    Ceci Esposito, PT Physical Therapist                        OP Exercises       Row Name 09/12/23 1434 09/12/23 1400          Subjective    Subjective Comments -- States he brought old supplies with him, but feels it's probably time to purchase new supplies.  -KD        Subjective Pain    Able to rate subjective pain? -- yes  -KD     Pre-Treatment Pain Level -- 0  -KD        Total Minutes    40043 - PT Manual Therapy Minutes 58  -KD --               User Key  (r) = Recorded By, (t) = Taken By, (c) = Cosigned By      Initials  Name Provider Type    Ceci Esposito, MANDA Physical Therapist                            Manual Rx (last 36 hours)       Manual Treatments       Row Name 09/12/23 1434             Total Minutes    72921 - PT Manual Therapy Minutes 58  -KD                User Key  (r) = Recorded By, (t) = Taken By, (c) = Cosigned By      Initials Name Provider Type    Ceci Esposito PT Physical Therapist                        Therapy Education  Education Details: Advised pt to remove bandaging in the morning prior to appt and wash legs if able. Discussed purchasing new bandaging.  Given: Bandaging/dressing change  Program: New, Reinforced  How Provided: Verbal  Provided to: Patient  Level of Understanding: Verbalized              Time Calculation:   Start Time: 0922  Stop Time: 1020  Time Calculation (min): 58 min  Total Timed Code Minutes- PT: 58 minute(s)  Timed Charges  18547 - PT Manual Therapy Minutes: 58  Total Minutes  Timed Charges Total Minutes: 58   Total Minutes: 58   Therapy Charges for Today       Code Description Service Date Service Provider Modifiers Qty    70320549442 HC PT MANUAL THERAPY EA 15 MIN 9/12/2023 Ceci Ruby, PT KX, GP 4                      Ceci Ruby PT  9/12/2023

## 2023-09-13 ENCOUNTER — APPOINTMENT (OUTPATIENT)
Dept: PHYSICAL THERAPY | Facility: HOSPITAL | Age: 73
End: 2023-09-13
Payer: MEDICARE

## 2023-09-15 ENCOUNTER — HOSPITAL ENCOUNTER (OUTPATIENT)
Dept: PHYSICAL THERAPY | Facility: HOSPITAL | Age: 73
Setting detail: THERAPIES SERIES
Discharge: HOME | End: 2023-09-15
Payer: MEDICARE

## 2023-09-15 DIAGNOSIS — I89.0 LYMPHEDEMA: Primary | ICD-10-CM

## 2023-09-15 PROCEDURE — 97140 MANUAL THERAPY 1/> REGIONS: CPT

## 2023-09-15 NOTE — THERAPY TREATMENT NOTE
Outpatient Physical Therapy Lymphedema Treatment Note  [facility]     Patient Name: Jose Hurtado  : 1950  MRN: [id number]  Today's Date: 9/15/2023        Visit Date: 09/15/2023    Visit Dx:    ICD-10-CM ICD-9-CM   1. Lymphedema  I89.0 457.1       Patient Active Problem List   Diagnosis    History of esophageal cancer    Adenomatous polyp of colon    Malignant neoplasm of prostate    RLS (restless legs syndrome)    History of DVT (deep vein thrombosis)    Recurrent major depressive disorder, in partial remission    Hypertension    Anemia    Insomnia due to other mental disorder    Peripheral polyneuropathy    B12 deficiency    Lymphedema    Iron deficiency    Gastroparesis    History of recurrent deep vein thrombosis (DVT)    Tremor of both hands    Gastrointestinal hemorrhage    Acute blood loss anemia    Pancytopenia    Chronic venous hypertension due to DVT    Bleeding hemorrhoid    Malabsorption of iron    Iron deficiency anemia    History of esophageal cancer    Abnormal CT scan    Generalized weakness    Chronic anticoagulation    CKD (chronic kidney disease) stage 2, GFR 60-89 ml/min    NSTEMI (non-ST elevated myocardial infarction)    Bronchitis    Dyspnea    Elevated troponin    Atrial fibrillation    Chronic obstructive pulmonary disease    Congestive heart failure    Gait instability    Dark stools    Proctitis    Severe malnutrition    Other symptoms and signs concerning food and fluid intake    Weight loss    Dehydration         Lymphedema       Row Name 09/15/23 0900             Subjective Pain    Able to rate subjective pain? yes  -KA      Pre-Treatment Pain Level 0  -KA         Subjective    Subjective Comments Pt did well with bandaging, removed them this morning prior to session.  -KA         Manual Lymphatic Drainage    Manual Lymphatic Drainage Comments Short neck, B axilla, B IA, diaphragmatic breathing, L inguinal, LLE  -KA         Compression/Skin Care    Compression/Skin Care  compression garment;skin care;wrapping location;bandaging  -KA      Skin Care washed/dried;moisturizing lotion applied  Eucerin  -KA      Wrapping Location lower extremity  -KA      Wrapping Location LE bilateral:;foot to knee  -KA      Bandage Layers cotton liner;padding/fluff layer;short-stretch bandages (comment size/quantity)  -KA      Bandaging Comments Tg9, Artiflex x 2 right foot to knee, Artiflex x 1 left foot & ankle/Rosidal Soft left ankle to knee, Rosidal K 3-10cm & 1-12cm to each leg, hospital socks, pt's shoes  -KA      Bandaging Technique circumferential/spiral;moderate compression;light compression  figure 8 x 2 at ankles  -KA                User Key  (r) = Recorded By, (t) = Taken By, (c) = Cosigned By      Initials Name Provider Type    Rubina Amaya, PT Physical Therapist                                   PT Assessment/Plan       Row Name 09/15/23 1011          PT Assessment    Assessment Comments Pt removed bandaging prior to session, appearance of legs/skin improved slightly.  Continued with MLD to LLE and compression bandaging to BLE today, added figure 8 x 2 at B ankles with first bandage to aid with ankle contours, otherwise spiral technique.  -KA     Rehab Potential Good  -KA     Patient/caregiver participated in establishment of treatment plan and goals Yes  -KA     Patient would benefit from skilled therapy intervention Yes  -KA        PT Plan    PT Plan Comments Continue CDT.  -KA               User Key  (r) = Recorded By, (t) = Taken By, (c) = Cosigned By      Initials Name Provider Type    Rubina Amaya, PT Physical Therapist                        OP Exercises       Row Name 09/15/23 1012 09/15/23 0900          Subjective    Subjective Comments -- Pt did well with bandaging, removed them this morning prior to session.  -KA        Subjective Pain    Able to rate subjective pain? -- yes  -KA     Pre-Treatment Pain Level -- 0  -KA        Total Minutes    48379 - PT Manual  Therapy Minutes 54  -KA --               User Key  (r) = Recorded By, (t) = Taken By, (c) = Cosigned By      Initials Name Provider Type    Rubina Amaya, MANDA Physical Therapist                            Manual Rx (last 36 hours)       Manual Treatments       Row Name 09/15/23 1012             Total Minutes    89326 - PT Manual Therapy Minutes 54  -KA                User Key  (r) = Recorded By, (t) = Taken By, (c) = Cosigned By      Initials Name Provider Type    Rubina Amaya, MANDA Physical Therapist                                       Time Calculation:   Start Time: 0915  Stop Time: 1009  Time Calculation (min): 54 min  Timed Charges  93647 - PT Manual Therapy Minutes: 54  Total Minutes  Timed Charges Total Minutes: 54   Total Minutes: 54   Therapy Charges for Today       Code Description Service Date Service Provider Modifiers Qty    99478291735 HC PT MANUAL THERAPY EA 15 MIN 9/15/2023 Rubina Powers, PT GP, KX 4                      Rubina Powers PT  9/15/2023

## 2023-09-18 ENCOUNTER — HOSPITAL ENCOUNTER (OUTPATIENT)
Dept: PHYSICAL THERAPY | Facility: HOSPITAL | Age: 73
Setting detail: THERAPIES SERIES
Discharge: HOME | End: 2023-09-18
Payer: MEDICARE

## 2023-09-18 PROCEDURE — 97140 MANUAL THERAPY 1/> REGIONS: CPT

## 2023-09-18 NOTE — THERAPY TREATMENT NOTE
"  Outpatient Physical Therapy Lymphedema Treatment Note  [facility]     Patient Name: Jose Hurtado  : 1950  MRN: [id number]  Today's Date: 2023        Visit Date: 2023    Visit Dx:  No diagnosis found.    Patient Active Problem List   Diagnosis    History of esophageal cancer    Adenomatous polyp of colon    Malignant neoplasm of prostate    RLS (restless legs syndrome)    History of DVT (deep vein thrombosis)    Recurrent major depressive disorder, in partial remission    Hypertension    Anemia    Insomnia due to other mental disorder    Peripheral polyneuropathy    B12 deficiency    Lymphedema    Iron deficiency    Gastroparesis    History of recurrent deep vein thrombosis (DVT)    Tremor of both hands    Gastrointestinal hemorrhage    Acute blood loss anemia    Pancytopenia    Chronic venous hypertension due to DVT    Bleeding hemorrhoid    Malabsorption of iron    Iron deficiency anemia    History of esophageal cancer    Abnormal CT scan    Generalized weakness    Chronic anticoagulation    CKD (chronic kidney disease) stage 2, GFR 60-89 ml/min    NSTEMI (non-ST elevated myocardial infarction)    Bronchitis    Dyspnea    Elevated troponin    Atrial fibrillation    Chronic obstructive pulmonary disease    Congestive heart failure    Gait instability    Dark stools    Proctitis    Severe malnutrition    Other symptoms and signs concerning food and fluid intake    Weight loss    Dehydration         Lymphedema       Row Name 23 1000             Subjective Pain    Able to rate subjective pain? yes  -KA      Pre-Treatment Pain Level 0  -KA         Subjective    Subjective Comments Pt kept bandaging on over weekend, removed this morning.  States \"It's not going down as fast as I thought.\"  -KA         Manual Lymphatic Drainage    Manual Lymphatic Drainage Comments Short neck, B axilla, B IA, diaphragmatic breathing, B inguinals, BLE  -KA         Compression/Skin Care    Compression/Skin " "Care skin care;wrapping location;bandaging  -DEVONTE      Skin Care moisturizing lotion applied  Eucerin  -KA      Wrapping Location lower extremity  -KA      Wrapping Location LE bilateral:;foot to knee  -      Bandage Layers cotton liner;padding/fluff layer;short-stretch bandages (comment size/quantity)  -DEVONTE      Bandaging Comments Tg9, Artiflex x 2 right foot to knee, Artiflex x 1 left foot & ankle/Rosidal Soft left ankle to knee, Rosidal K 3-10cm & 1-12cm to each leg, hospital socks, pt's shoes  -DEVONTE      Bandaging Technique figure-eight;light compression;moderate compression  -DEVONTE      Remove Bandages Pt removed bandaging and washed legs prior to his appointment  -DEVONTE                User Key  (r) = Recorded By, (t) = Taken By, (c) = Cosigned By      Initials Name Provider Type    Rubina Amaya, PT Physical Therapist                                   PT Assessment/Plan       Row Name 09/18/23 1014          PT Assessment    Assessment Comments Pt with minimal to no change in appearance of legs despite wearing bandaging all weekend.  Performed MLD to BLE and changed to figure 8 technique of bandaging to aid in reduction of stubborn edema, still 4 SS bandages per leg and light-moderate compression.  -DEVONTE        PT Plan    PT Plan Comments Assess response to bandaging, continue CDT.  -DEVONTE               User Key  (r) = Recorded By, (t) = Taken By, (c) = Cosigned By      Initials Name Provider Type    Rubina Amaya, PT Physical Therapist                        OP Exercises       Row Name 09/18/23 1016 09/18/23 1000          Subjective    Subjective Comments -- Pt kept bandaging on over weekend, removed this morning.  States \"It's not going down as fast as I thought.\"  -KA        Subjective Pain    Able to rate subjective pain? -- yes  -DEVONTE     Pre-Treatment Pain Level -- 0  -KA        Total Minutes    58199 - PT Manual Therapy Minutes 57  -KA --               User Key  (r) = Recorded By, (t) = Taken By, (c) = " Cosigned By      Initials Name Provider Type    Rubina Amaya PT Physical Therapist                            Manual Rx (last 36 hours)       Manual Treatments       Row Name 09/18/23 1016             Total Minutes    31655 - PT Manual Therapy Minutes 57  -KA                User Key  (r) = Recorded By, (t) = Taken By, (c) = Cosigned By      Initials Name Provider Type    Rubina Amaya PT Physical Therapist                        Therapy Education  Education Details: Pt will need to bring bandaging back for next appt.  Given: Bandaging/dressing change  Program: New  How Provided: Verbal  Provided to: Patient  Level of Understanding: Verbalized              Time Calculation:   Start Time: 0915  Stop Time: 1012  Time Calculation (min): 57 min  Timed Charges  01201 - PT Manual Therapy Minutes: 57  Total Minutes  Timed Charges Total Minutes: 57   Total Minutes: 57   Therapy Charges for Today       Code Description Service Date Service Provider Modifiers Qty    74207602084 HC PT MANUAL THERAPY EA 15 MIN 9/18/2023 Rubina Powers, PT GP, KX 4                      Rubina Powers PT  9/18/2023

## 2023-09-19 ENCOUNTER — HOSPITAL ENCOUNTER (OUTPATIENT)
Dept: PHYSICAL THERAPY | Facility: HOSPITAL | Age: 73
Setting detail: THERAPIES SERIES
Discharge: HOME | End: 2023-09-19
Payer: MEDICARE

## 2023-09-19 DIAGNOSIS — I89.0 LYMPHEDEMA: Primary | ICD-10-CM

## 2023-09-19 PROCEDURE — 97140 MANUAL THERAPY 1/> REGIONS: CPT

## 2023-09-19 NOTE — THERAPY TREATMENT NOTE
Outpatient Physical Therapy Lymphedema Treatment Note  [facility]     Patient Name: Jose Hurtado  : 1950  MRN: [id number]  Today's Date: 2023        Visit Date: 2023    Visit Dx:    ICD-10-CM ICD-9-CM   1. Lymphedema  I89.0 457.1       Patient Active Problem List   Diagnosis    History of esophageal cancer    Adenomatous polyp of colon    Malignant neoplasm of prostate    RLS (restless legs syndrome)    History of DVT (deep vein thrombosis)    Recurrent major depressive disorder, in partial remission    Hypertension    Anemia    Insomnia due to other mental disorder    Peripheral polyneuropathy    B12 deficiency    Lymphedema    Iron deficiency    Gastroparesis    History of recurrent deep vein thrombosis (DVT)    Tremor of both hands    Gastrointestinal hemorrhage    Acute blood loss anemia    Pancytopenia    Chronic venous hypertension due to DVT    Bleeding hemorrhoid    Malabsorption of iron    Iron deficiency anemia    History of esophageal cancer    Abnormal CT scan    Generalized weakness    Chronic anticoagulation    CKD (chronic kidney disease) stage 2, GFR 60-89 ml/min    NSTEMI (non-ST elevated myocardial infarction)    Bronchitis    Dyspnea    Elevated troponin    Atrial fibrillation    Chronic obstructive pulmonary disease    Congestive heart failure    Gait instability    Dark stools    Proctitis    Severe malnutrition    Other symptoms and signs concerning food and fluid intake    Weight loss    Dehydration         Lymphedema       Row Name 23 1100             Subjective Pain    Able to rate subjective pain? yes  -KD      Pre-Treatment Pain Level 0  -KD         Subjective    Subjective Comments Pt pleased with how well right leg is responding, but says left not doing as well.  -KD         Manual Lymphatic Drainage    Manual Lymphatic Drainage Comments Short neck, B axilla, B IA, diaphragmatic breathing, L inguinals, LLE  -KD         Compression/Skin Care     Compression/Skin Care skin care;wrapping location;bandaging  -KD      Skin Care moisturizing lotion applied  Eucerin  -KD      Wrapping Location lower extremity  -KD      Wrapping Location LE bilateral:;foot to knee  -KD      Bandage Layers cotton liner;padding/fluff layer;short-stretch bandages (comment size/quantity)  -KD      Bandaging Comments Tg9, Artiflex x 2 right foot to knee, Artiflex x 1 left foot & ankle/Rosidal Soft left ankle to knee, Rosidal K 3-10cm & 1-12cm to each leg, hospital socks, pt's shoes  -KD      Bandaging Technique figure-eight;moderate compression  -KD      Remove Bandages Pt removed bandaging and washed legs prior to his appointment  -KD                User Key  (r) = Recorded By, (t) = Taken By, (c) = Cosigned By      Initials Name Provider Type    Ceci Esposito, PT Physical Therapist                                   PT Assessment/Plan       Row Name 09/19/23 1137          PT Assessment    Assessment Comments Focused MLD today on LLE. Having some difficulty keeping up with when bandages need to be washed, so asked him to bring bandages he removes tomorrow morning back in with him to be used for the third time.  -KD        PT Plan    PT Plan Comments Cont CDT.  -KD               User Key  (r) = Recorded By, (t) = Taken By, (c) = Cosigned By      Initials Name Provider Type    Ceci Esposito, PT Physical Therapist                        OP Exercises       Row Name 09/19/23 1141 09/19/23 1100          Subjective    Subjective Comments -- Pt pleased with how well right leg is responding, but says left not doing as well.  -KD        Subjective Pain    Able to rate subjective pain? -- yes  -KD     Pre-Treatment Pain Level -- 0  -KD        Total Minutes    85750 - PT Manual Therapy Minutes 53  -KD --               User Key  (r) = Recorded By, (t) = Taken By, (c) = Cosigned By      Initials Name Provider Type    Ceci Esposito, PT Physical Therapist                             Manual Rx (last 36 hours)       Manual Treatments       Row Name 09/19/23 1141             Total Minutes    74819 - PT Manual Therapy Minutes 53  -KD                User Key  (r) = Recorded By, (t) = Taken By, (c) = Cosigned By      Initials Name Provider Type    Ceci Esposito, PT Physical Therapist                        Therapy Education  Education Details: Recommended pt bring back same bandaging supplies tomorrow to be used so we can better keep up with washing schedule Also reminded him to bring in any clean stockinette that he has at home.  Given: Bandaging/dressing change  Program: New, Reinforced  How Provided: Verbal  Provided to: Patient  Level of Understanding: Verbalized              Time Calculation:   Start Time: 0918  Stop Time: 1011  Time Calculation (min): 53 min  Total Timed Code Minutes- PT: 53 minute(s)  Timed Charges  22168 - PT Manual Therapy Minutes: 53  Total Minutes  Timed Charges Total Minutes: 53   Total Minutes: 53   Therapy Charges for Today       Code Description Service Date Service Provider Modifiers Qty    56220430547 HC PT MANUAL THERAPY EA 15 MIN 9/19/2023 Ceci Ruby, PT KX, GP 4                      Ceci Ruby, PT  9/19/2023

## 2023-09-20 ENCOUNTER — HOSPITAL ENCOUNTER (OUTPATIENT)
Dept: PHYSICAL THERAPY | Facility: HOSPITAL | Age: 73
Setting detail: THERAPIES SERIES
Discharge: HOME | End: 2023-09-20
Payer: MEDICARE

## 2023-09-20 DIAGNOSIS — I89.0 LYMPHEDEMA: Primary | ICD-10-CM

## 2023-09-20 PROCEDURE — 97140 MANUAL THERAPY 1/> REGIONS: CPT

## 2023-09-20 NOTE — THERAPY PROGRESS REPORT/RE-CERT
Outpatient Physical Therapy Lymphedema Progress Note  [facility]     Patient Name: Jose Hurtado  : 1950  MRN: [id number]  Today's Date: 2023        Visit Date: 2023    Visit Dx:    ICD-10-CM ICD-9-CM   1. Lymphedema  I89.0 457.1       Patient Active Problem List   Diagnosis    History of esophageal cancer    Adenomatous polyp of colon    Malignant neoplasm of prostate    RLS (restless legs syndrome)    History of DVT (deep vein thrombosis)    Recurrent major depressive disorder, in partial remission    Hypertension    Anemia    Insomnia due to other mental disorder    Peripheral polyneuropathy    B12 deficiency    Lymphedema    Iron deficiency    Gastroparesis    History of recurrent deep vein thrombosis (DVT)    Tremor of both hands    Gastrointestinal hemorrhage    Acute blood loss anemia    Pancytopenia    Chronic venous hypertension due to DVT    Bleeding hemorrhoid    Malabsorption of iron    Iron deficiency anemia    History of esophageal cancer    Abnormal CT scan    Generalized weakness    Chronic anticoagulation    CKD (chronic kidney disease) stage 2, GFR 60-89 ml/min    NSTEMI (non-ST elevated myocardial infarction)    Bronchitis    Dyspnea    Elevated troponin    Atrial fibrillation    Chronic obstructive pulmonary disease    Congestive heart failure    Gait instability    Dark stools    Proctitis    Severe malnutrition    Other symptoms and signs concerning food and fluid intake    Weight loss    Dehydration         Lymphedema       Row Name 23 0900             Subjective Pain    Able to rate subjective pain? yes  -KA      Pre-Treatment Pain Level 0  -KA         Subjective    Subjective Comments Pt thinks legs are going down now, feels RLE is almost back to normal.  -KA         Lymphedema Edema Assessment    Ptting Edema Category By grade out of 4  -KA      Pitting Edema + 2/4;+ 3/4  -KA      Stemmer Sign positive;bilateral:  -KA      Edema Assessment Comment Moderate  lymphedema present in BLE from foot to knee, worse on LLE, beginning to reduce bilaterally  -KA         Skin Changes/Observations    Skin Observations Comment Light redness BLE, still with dry skin, no open wounds or weeping, continues to have firmness/fibrosis LLE especially lower 1/2 of of lower leg and ankle  -KA         Lymphedema Measurements    Measurement Type(s) Circumferential  -KA      Circumferential Areas Lower extremities  -KA         BLE Circumferential (cm)    Measurement Location 1 Knee  -KA      Left 1 38.9 cm  -KA      Right 1 37.2 cm  -KA      Measurement Location 2 10cm below knee  -KA      Left 2 40 cm  -KA      Right 2 39.2 cm  -KA      Measurement Location 3 20cm below knee  -KA      Left 3 44.1 cm  -KA      Right 3 38.8 cm  -KA      Measurement Location 4 30cm below knee  -KA      Left 4 37.3 cm  -KA      Right 4 34.5 cm  -KA      Measurement Location 5 Ankle  -KA      Left 5 32.6 cm  -KA      Right 5 30.2 cm  -KA      Measurement Location 6 Midfoot  -KA      Left 6 29.4 cm  -KA      Right 6 26.4 cm  -KA      LLE Circumferential Total 222.3 cm  -KA      RLE Circumferential Total 206.3 cm  -KA         Manual Lymphatic Drainage    Manual Lymphatic Drainage Comments Short neck, B axilla, B IA, diaphragmatic breathing, L inguinals, LLE  -KA         Compression/Skin Care    Compression/Skin Care skin care;wrapping location;bandaging  -KA      Skin Care washed/dried;moisturizing lotion applied  Eucerin  -KA      Wrapping Location lower extremity  -KA      Wrapping Location LE bilateral:;foot to knee  -KA      Bandage Layers cotton liner;padding/fluff layer;short-stretch bandages (comment size/quantity)  -KA      Bandaging Comments Tg9, Artiflex x 2 right foot to knee, Artiflex x 1 left foot & ankle/Rosidal Soft left ankle to knee, Rosidal K 3-10cm & 1-12cm to each leg, hospital socks, pt's shoes  -KA      Bandaging Technique figure-eight;moderate compression  -KA      Remove Bandages Pt removed  bandages prior to this appointment  -                User Key  (r) = Recorded By, (t) = Taken By, (c) = Cosigned By      Initials Name Provider Type    Rubina Amaya, PT Physical Therapist                                   PT Assessment/Plan       Row Name 09/20/23 1024          PT Assessment    Functional Limitations Limitation in home management;Performance in leisure activities;Performance in self-care ADL  -KA     Impairments Edema;Gait;Impaired lymphatic circulation;Integumentary integrity;Posture  -     Assessment Comments Pt is making excellent progress with CDT.  After one week of treatment, he has seen net decreased edema of 11 cm LLE and 12.2 cm RLE.  Figure 8 technique of bandaging with moderate compression has aided with reduction, will continue to use this method.  Also continued MLD focused on LLE today.  Dry skin and fibrosis still present, but slowly improving.  At this time, pt has met 1/2 STG and 0/1 LTG.  He remains in the active reduction phase and has not reached baseline measurements from end of last episode of care, so not appropriate to order long term compression yet at this point.  Pt remains appropriate for skilled physical therapy to reduce edema, improve skin condition, reduce infection risk, and improve self management of condition.  -KA     Rehab Potential Good  -     Patient/caregiver participated in establishment of treatment plan and goals Yes  -     Patient would benefit from skilled therapy intervention Yes  -KA        PT Plan    PT Frequency 5x/week  -     Predicted Duration of Therapy Intervention (PT) 2-3 weeks per original PT POC  -KA     Planned CPT's? PT RE-EVAL: 10228;PT THER PROC EA 15 MIN: 18849;PT THER ACT EA 15 MIN: 31841;PT MANUAL THERAPY EA 15 MIN: 85401;PT NEUROMUSC RE-EDUCATION EA 15 MIN: 01185;PT GAIT TRAINING EA 15 MIN: 15350;PT SELF CARE/HOME MGMT/TRAIN EA 15: 27602  -     PT Plan Comments Continue CDT.  -               User Key  (r) =  Recorded By, (t) = Taken By, (c) = Cosigned By      Initials Name Provider Type    Rubina Amaya, PT Physical Therapist                        OP Exercises       Row Name 09/20/23 1028 09/20/23 0900          Subjective    Subjective Comments -- Pt thinks legs are going down now, feels RLE is almost back to normal.  -KA        Subjective Pain    Able to rate subjective pain? -- yes  -KA     Pre-Treatment Pain Level -- 0  -KA        Total Minutes    62514 - PT Manual Therapy Minutes 53  -KA --               User Key  (r) = Recorded By, (t) = Taken By, (c) = Cosigned By      Initials Name Provider Type    Rubina Amaya, PT Physical Therapist                            Manual Rx (last 36 hours)       Manual Treatments       Row Name 09/20/23 1028             Total Minutes    93912 - PT Manual Therapy Minutes 53  -KA                User Key  (r) = Recorded By, (t) = Taken By, (c) = Cosigned By      Initials Name Provider Type    Rubina Amaya, PT Physical Therapist                     PT OP Goals       Row Name 09/20/23 1000          PT Short Term Goals    STG Date to Achieve 09/25/23  -KA     STG 1 Pt demo decreased net edema of >/=10-20cm for decreased edema symptoms, decreased risk of infection, and improved skin care.  -KA     STG 1 Progress Met;Ongoing  -KA     STG 2 Pt will purchase new compression garments as indicated for his condition, based on current girth measurements.  -     STG 2 Progress New  -        Long Term Goals    LTG Date to Achieve 10/02/23  -KA     LTG 1 Pt/family independent with compression garments as indicated for self-management of condition.  -     LTG 1 Progress New  -KA               User Key  (r) = Recorded By, (t) = Taken By, (c) = Cosigned By      Initials Name Provider Type    Rubina Amaya, PT Physical Therapist                    Therapy Education  Education Details: Discussed girth measurements, still needs to reduce at least 2 more cm at left ankle  to fit back into same size of compression garments that he has been wearing, will reassess quick girth again on 9/22 and order compression garments if indicated, anticipate he will need to continue treatment next week.  Pt to wash SS bandages after he removes them, but bring foam and cotton back to clinic to be reused.  Discussed possibility that he may need reassessment every 6 months going forward to prevent progression of condition and make sure his long term compression is adequately containing edema/help order more as indicated, pt agreeable to this plan.  Given: Edema management, Bandaging/dressing change  Program: New, Reinforced  How Provided: Verbal  Provided to: Patient  Level of Understanding: Verbalized              Time Calculation:   Start Time: 0918  Stop Time: 1011  Time Calculation (min): 53 min  Timed Charges  45843 - PT Manual Therapy Minutes: 53  Total Minutes  Timed Charges Total Minutes: 53   Total Minutes: 53   Therapy Charges for Today       Code Description Service Date Service Provider Modifiers Qty    37612260759 HC PT MANUAL THERAPY EA 15 MIN 9/20/2023 Rubina Powers, PT GP, KX 4                      Rubina Powers, PT  9/20/2023

## 2023-09-21 ENCOUNTER — HOSPITAL ENCOUNTER (OUTPATIENT)
Dept: PHYSICAL THERAPY | Facility: HOSPITAL | Age: 73
Setting detail: THERAPIES SERIES
Discharge: HOME | End: 2023-09-21
Payer: MEDICARE

## 2023-09-21 DIAGNOSIS — I89.0 LYMPHEDEMA: Primary | ICD-10-CM

## 2023-09-21 PROCEDURE — 97140 MANUAL THERAPY 1/> REGIONS: CPT

## 2023-09-21 NOTE — THERAPY TREATMENT NOTE
Outpatient Physical Therapy Lymphedema Treatment Note  [facility]     Patient Name: Jose Hurtado  : 1950  MRN: [id number]  Today's Date: 2023        Visit Date: 2023    Visit Dx:    ICD-10-CM ICD-9-CM   1. Lymphedema  I89.0 457.1       Patient Active Problem List   Diagnosis    History of esophageal cancer    Adenomatous polyp of colon    Malignant neoplasm of prostate    RLS (restless legs syndrome)    History of DVT (deep vein thrombosis)    Recurrent major depressive disorder, in partial remission    Hypertension    Anemia    Insomnia due to other mental disorder    Peripheral polyneuropathy    B12 deficiency    Lymphedema    Iron deficiency    Gastroparesis    History of recurrent deep vein thrombosis (DVT)    Tremor of both hands    Gastrointestinal hemorrhage    Acute blood loss anemia    Pancytopenia    Chronic venous hypertension due to DVT    Bleeding hemorrhoid    Malabsorption of iron    Iron deficiency anemia    History of esophageal cancer    Abnormal CT scan    Generalized weakness    Chronic anticoagulation    CKD (chronic kidney disease) stage 2, GFR 60-89 ml/min    NSTEMI (non-ST elevated myocardial infarction)    Bronchitis    Dyspnea    Elevated troponin    Atrial fibrillation    Chronic obstructive pulmonary disease    Congestive heart failure    Gait instability    Dark stools    Proctitis    Severe malnutrition    Other symptoms and signs concerning food and fluid intake    Weight loss    Dehydration         Lymphedema       Row Name 23 1100             Subjective Pain    Able to rate subjective pain? yes  -KD      Pre-Treatment Pain Level 0  -KD         Subjective    Subjective Comments Pleased with how well his legs are going down.  -KD         Manual Lymphatic Drainage    Manual Lymphatic Drainage Comments Short neck, B axilla, B IA, diaphragmatic breathing, L inguinals, LLE  -KD         Compression/Skin Care    Compression/Skin Care skin care;wrapping  location;bandaging  -KD      Skin Care washed/dried;moisturizing lotion applied  Eucerin  -KD      Wrapping Location lower extremity  -KD      Wrapping Location LE bilateral:;foot to knee  -KD      Bandage Layers cotton liner;padding/fluff layer;short-stretch bandages (comment size/quantity)  -KD      Bandaging Comments Tg9, Artiflex x 2 right foot to knee, Artiflex x 1 left foot & ankle/Rosidal Soft left ankle to knee, Rosidal K 3-10cm & 1-12cm to each leg, hospital socks, pt's shoes  -KD      Bandaging Technique figure-eight;moderate compression  -KD      Remove Bandages Pt removed bandages prior to this appointment  -KD      Compression/Skin Care Comments Applied clean bandages today  -KD                User Key  (r) = Recorded By, (t) = Taken By, (c) = Cosigned By      Initials Name Provider Type    Ceci Esposito, PT Physical Therapist                                   PT Assessment/Plan       Row Name 09/21/23 1112          PT Assessment    Assessment Comments Pt's edema is improving steadily. He is pleased with progress.  -KD        PT Plan    PT Plan Comments Cont CDT.  -KD               User Key  (r) = Recorded By, (t) = Taken By, (c) = Cosigned By      Initials Name Provider Type    Ceci Esposito, PT Physical Therapist                        OP Exercises       Row Name 09/21/23 1113 09/21/23 1100          Subjective    Subjective Comments -- Pleased with how well his legs are going down.  -KD        Subjective Pain    Able to rate subjective pain? -- yes  -KD     Pre-Treatment Pain Level -- 0  -KD        Total Minutes    02138 - PT Manual Therapy Minutes 50  -KD --               User Key  (r) = Recorded By, (t) = Taken By, (c) = Cosigned By      Initials Name Provider Type    Ceci Esposito, PT Physical Therapist                            Manual Rx (last 36 hours)       Manual Treatments       Row Name 09/21/23 1113             Total Minutes    43743 - PT Manual Therapy Minutes 50  -KD                 User Key  (r) = Recorded By, (t) = Taken By, (c) = Cosigned By      Initials Name Provider Type    Ceci Esposito, PT Physical Therapist                        Therapy Education  Education Details: Discussed progress and plan  Given: Edema management  Program: Reinforced  How Provided: Verbal  Provided to: Patient  Level of Understanding: Verbalized              Time Calculation:   Start Time: 0920  Stop Time: 1010  Time Calculation (min): 50 min  Total Timed Code Minutes- PT: 50 minute(s)  Timed Charges  60222 - PT Manual Therapy Minutes: 50  Total Minutes  Timed Charges Total Minutes: 50   Total Minutes: 50   Therapy Charges for Today       Code Description Service Date Service Provider Modifiers Qty    90007652050 HC PT MANUAL THERAPY EA 15 MIN 9/21/2023 Ceci Ruby, PT KX, GP 3                      Ceci Ruby, PT  9/21/2023

## 2023-09-22 ENCOUNTER — HOSPITAL ENCOUNTER (OUTPATIENT)
Dept: PHYSICAL THERAPY | Facility: HOSPITAL | Age: 73
Setting detail: THERAPIES SERIES
Discharge: HOME | End: 2023-09-22
Payer: MEDICARE

## 2023-09-22 DIAGNOSIS — I89.0 LYMPHEDEMA: Primary | ICD-10-CM

## 2023-09-22 PROCEDURE — 97140 MANUAL THERAPY 1/> REGIONS: CPT

## 2023-09-22 PROCEDURE — 97535 SELF CARE MNGMENT TRAINING: CPT

## 2023-09-22 NOTE — THERAPY TREATMENT NOTE
Outpatient Physical Therapy Lymphedema Treatment Note  [facility]     Patient Name: Jose Hurtado  : 1950  MRN: [id number]  Today's Date: 2023        Visit Date: 2023    Visit Dx:    ICD-10-CM ICD-9-CM   1. Lymphedema  I89.0 457.1       Patient Active Problem List   Diagnosis    History of esophageal cancer    Adenomatous polyp of colon    Malignant neoplasm of prostate    RLS (restless legs syndrome)    History of DVT (deep vein thrombosis)    Recurrent major depressive disorder, in partial remission    Hypertension    Anemia    Insomnia due to other mental disorder    Peripheral polyneuropathy    B12 deficiency    Lymphedema    Iron deficiency    Gastroparesis    History of recurrent deep vein thrombosis (DVT)    Tremor of both hands    Gastrointestinal hemorrhage    Acute blood loss anemia    Pancytopenia    Chronic venous hypertension due to DVT    Bleeding hemorrhoid    Malabsorption of iron    Iron deficiency anemia    History of esophageal cancer    Abnormal CT scan    Generalized weakness    Chronic anticoagulation    CKD (chronic kidney disease) stage 2, GFR 60-89 ml/min    NSTEMI (non-ST elevated myocardial infarction)    Bronchitis    Dyspnea    Elevated troponin    Atrial fibrillation    Chronic obstructive pulmonary disease    Congestive heart failure    Gait instability    Dark stools    Proctitis    Severe malnutrition    Other symptoms and signs concerning food and fluid intake    Weight loss    Dehydration         Lymphedema       Row Name 23 1000             Subjective Pain    Able to rate subjective pain? yes  -KA      Pre-Treatment Pain Level 0  -KA      Subjective Pain Comment No pain in legs.  -KA         Subjective    Subjective Comments Pt concerned he may have a UTI, brought in assorted compression garments to be assessed to see if they are still useable.  -KA         Lymphedema Edema Assessment    Edema Assessment Comment Lymphedema significantly reduced LLE  today  -KA         Skin Changes/Observations    Skin Observations Comment Less skin dryness and redness today  -KA         Manual Lymphatic Drainage    Manual Lymphatic Drainage Comments No MLD  -KA         Compression/Skin Care    Compression/Skin Care skin care;wrapping location;bandaging  -KA      Skin Care washed/dried;moisturizing lotion applied  -KA      Wrapping Location lower extremity  -KA      Wrapping Location LE bilateral:;foot to knee  -KA      Bandage Layers cotton liner;padding/fluff layer;short-stretch bandages (comment size/quantity)  -KA      Bandaging Comments Tg9, Artiflex x 2 right foot to knee, Artiflex x 1 left foot & ankle/Rosidal Soft left ankle to knee, Rosidal K 3-10cm & 1-12cm to each leg, hospital socks, pt's shoes  -KA      Bandaging Technique figure-eight;moderate compression  -KA      Remove Bandages Pt removed bandaging while therapist assessed compression garments  -KA      Compression/Skin Care Comments Applied clean bandaging today.  -                User Key  (r) = Recorded By, (t) = Taken By, (c) = Cosigned By      Initials Name Provider Type    Rubina Amaya, PT Physical Therapist                                   PT Assessment/Plan       Row Name 09/22/23 1221          PT Assessment    Assessment Comments Pt with obvious reduction of LLE since start of week, assessed L ankle girth at 29.8 cm which puts him back in target range for compression garments he has traditionally used.  Evaluated a large stash of compression garments pt has been using over several years, threw many away but did find 3 pair Mediven Dual Layer system 30-40 mmHG in size XL which seemed to still have good-fair compression.  Pt also assisted in purchasing a new pair of compression garments in this size to be shipped to home.  Instructed to arely new compression garment tag when he receives them and compare old to new, if there is a significant difference in compression, he will need to purchase at  least one extra pair and throw old garments away.  Continued with compression bandaging today.  -KA        PT Plan    PT Plan Comments Reassess girth measurements, assess independence with compression garments.  Possible discharge on 9/25.  -KA               User Key  (r) = Recorded By, (t) = Taken By, (c) = Cosigned By      Initials Name Provider Type    Rubina Amaya, PT Physical Therapist                        OP Exercises       Row Name 09/22/23 1225 09/22/23 1000          Subjective    Subjective Comments -- Pt concerned he may have a UTI, brought in assorted compression garments to be assessed to see if they are still useable.  -KA        Subjective Pain    Able to rate subjective pain? -- yes  -KA     Pre-Treatment Pain Level -- 0  -KA     Subjective Pain Comment -- No pain in legs.  -KA        Total Minutes    49344 - PT Manual Therapy Minutes 30  -KA --               User Key  (r) = Recorded By, (t) = Taken By, (c) = Cosigned By      Initials Name Provider Type    Rubina Amaya, PT Physical Therapist                            Manual Rx (last 36 hours)       Manual Treatments       Row Name 09/22/23 1225             Total Minutes    43710 - PT Manual Therapy Minutes 30  -KA                User Key  (r) = Recorded By, (t) = Taken By, (c) = Cosigned By      Initials Name Provider Type    Rubina Amaya, PT Physical Therapist                     PT OP Goals       Row Name 09/22/23 1200          PT Short Term Goals    STG Date to Achieve 09/25/23  -     STG 1 Pt demo decreased net edema of >/=10-20cm for decreased edema symptoms, decreased risk of infection, and improved skin care.  -KA     STG 1 Progress Met;Ongoing  -KA     STG 2 Pt will purchase new compression garments as indicated for his condition, based on current girth measurements.  -KA     STG 2 Progress Met  -KA     STG 2 Progress Comments New garments purchased 9/22 (Mediven Dual Layer system, 30-40 mmHg size XL from [employer])  -DEVONTE        Long Term Goals    LTG Date to Achieve 10/02/23  -DEVONTE     LTG 1 Pt/family independent with compression garments as indicated for self-management of condition.  -DEVONTE     LTG 1 Progress New  -DEVONTE               User Key  (r) = Recorded By, (t) = Taken By, (c) = Cosigned By      Initials Name Provider Type    Rubina Amaya, PT Physical Therapist                    Therapy Education  Education Details: Rechecked ankle girth measurement, has reduced so that he will fit into same size garments that he has recently been wearing.  Assessed all compression garments brought to clinic.  Pt has 3 pair of Mediven Dual Layer 30-40 mmHG in XL that seem to have decent compression, all other sets thrown away. Pt does have quite a few compressive undersocks that went with velcro garment that he dislikes, educated that he should not wear these as an alternative to dual layer system as they do not have adequate compression for leg or foot.  Assisted pt in ordering one more pair of Mediven Dual Layer 30-40 mmHg in size XL, when he receives them he should compare them to the ones he has and if there is a significant difference in how they feel or how easy they are to put on, he needs to throw out the compression garments that were saved today and get another pair of new ones.  Ordered garments from [employer], shipping to patient's home.  For next visit on Monday, pt to wear bandaging until Monday AM, then remove, wash legs, and don Dual layer compression garments prior to coming to treatment to assess ability at home, pt is familiar with donning process.  If legs are comparable in size to end of last episode of treatment, anticipate discharge back to self management; if not, continue bandaging.  Discussed  that due to history of lymphedema over past couple years, pt may need to come in for a reassessment in 6 months to either assist in ordering new compression garments or to get a brief episode of wrapping if  girth measurements are creeping up.  Given: Edema management, Bandaging/dressing change  Program: New, Reinforced  How Provided: Verbal  Provided to: Patient  Level of Understanding: Verbalized  59589 - PT Self Care/Mgmt Minutes: 23              Time Calculation:   Start Time: 0915  Stop Time: 1008  Time Calculation (min): 53 min  Timed Charges  50341 - PT Manual Therapy Minutes: 30  18380 - PT Self Care/Mgmt Minutes: 23  Total Minutes  Timed Charges Total Minutes: 53   Total Minutes: 53   Therapy Charges for Today       Code Description Service Date Service Provider Modifiers Qty    12886712085 HC PT MANUAL THERAPY EA 15 MIN 9/22/2023 Rubina Powers, PT GP, KX 2    73359329264 HC PT SELF CARE/MGMT/TRAIN EA 15 MIN 9/22/2023 Rubina Powers, PT GP, KX 2                      Rubina Powers, PT  9/22/2023      1

## 2023-09-24 ENCOUNTER — HOSPITAL ENCOUNTER (EMERGENCY)
Facility: HOSPITAL | Age: 73
Discharge: HOME | End: 2023-09-25
Attending: EMERGENCY MEDICINE | Admitting: EMERGENCY MEDICINE
Payer: MEDICARE

## 2023-09-24 VITALS
SYSTOLIC BLOOD PRESSURE: 125 MMHG | TEMPERATURE: 98 F | RESPIRATION RATE: 16 BRPM | DIASTOLIC BLOOD PRESSURE: 75 MMHG | OXYGEN SATURATION: 96 % | HEART RATE: 85 BPM

## 2023-09-24 DIAGNOSIS — R11.2 NAUSEA AND VOMITING, UNSPECIFIED VOMITING TYPE: Primary | ICD-10-CM

## 2023-09-24 DIAGNOSIS — N39.0 URINARY TRACT INFECTION WITH HEMATURIA, SITE UNSPECIFIED: ICD-10-CM

## 2023-09-24 DIAGNOSIS — D64.9 ANEMIA, UNSPECIFIED TYPE: ICD-10-CM

## 2023-09-24 DIAGNOSIS — K31.84 GASTROPARESIS: ICD-10-CM

## 2023-09-24 DIAGNOSIS — R31.9 URINARY TRACT INFECTION WITH HEMATURIA, SITE UNSPECIFIED: ICD-10-CM

## 2023-09-24 DIAGNOSIS — N18.9 CHRONIC KIDNEY DISEASE, UNSPECIFIED CKD STAGE: ICD-10-CM

## 2023-09-24 LAB
ALBUMIN SERPL-MCNC: 3.5 G/DL (ref 3.5–5.2)
ALBUMIN/GLOB SERPL: 1.1 G/DL
ALP SERPL-CCNC: 91 U/L (ref 39–117)
ALT SERPL W P-5'-P-CCNC: 14 U/L (ref 1–41)
ANION GAP SERPL CALCULATED.3IONS-SCNC: 9.8 MMOL/L (ref 5–15)
AST SERPL-CCNC: 17 U/L (ref 1–40)
BACTERIA UR QL AUTO: ABNORMAL /HPF
BASOPHILS # BLD AUTO: 0.02 10*3/MM3 (ref 0–0.2)
BASOPHILS NFR BLD AUTO: 0.6 % (ref 0–1.5)
BILIRUB SERPL-MCNC: 0.3 MG/DL (ref 0–1.2)
BILIRUB UR QL STRIP: NEGATIVE
BUN SERPL-MCNC: 26 MG/DL (ref 8–23)
BUN/CREAT SERPL: 20.2 (ref 7–25)
CALCIUM SPEC-SCNC: 8.3 MG/DL (ref 8.6–10.5)
CHLORIDE SERPL-SCNC: 107 MMOL/L (ref 98–107)
CLARITY UR: ABNORMAL
CO2 SERPL-SCNC: 23.2 MMOL/L (ref 22–29)
COLOR UR: YELLOW
CREAT SERPL-MCNC: 1.29 MG/DL (ref 0.76–1.27)
DEPRECATED RDW RBC AUTO: 46.4 FL (ref 37–54)
EGFRCR SERPLBLD CKD-EPI 2021: 57.8 ML/MIN/1.73
EOSINOPHIL # BLD AUTO: 0.03 10*3/MM3 (ref 0–0.4)
EOSINOPHIL NFR BLD AUTO: 0.8 % (ref 0.3–6.2)
ERYTHROCYTE [DISTWIDTH] IN BLOOD BY AUTOMATED COUNT: 13.8 % (ref 12.3–15.4)
GLOBULIN UR ELPH-MCNC: 3.1 GM/DL
GLUCOSE SERPL-MCNC: 137 MG/DL (ref 65–99)
GLUCOSE UR STRIP-MCNC: NEGATIVE MG/DL
HCT VFR BLD AUTO: 29.1 % (ref 37.5–51)
HGB BLD-MCNC: 9.5 G/DL (ref 13–17.7)
HGB UR QL STRIP.AUTO: ABNORMAL
HYALINE CASTS UR QL AUTO: ABNORMAL /LPF
IMM GRANULOCYTES # BLD AUTO: 0.02 10*3/MM3 (ref 0–0.05)
IMM GRANULOCYTES NFR BLD AUTO: 0.6 % (ref 0–0.5)
KETONES UR QL STRIP: NEGATIVE
LEUKOCYTE ESTERASE UR QL STRIP.AUTO: ABNORMAL
LIPASE SERPL-CCNC: 53 U/L (ref 13–60)
LYMPHOCYTES # BLD AUTO: 0.59 10*3/MM3 (ref 0.7–3.1)
LYMPHOCYTES NFR BLD AUTO: 16.7 % (ref 19.6–45.3)
MCH RBC QN AUTO: 29.6 PG (ref 26.6–33)
MCHC RBC AUTO-ENTMCNC: 32.6 G/DL (ref 31.5–35.7)
MCV RBC AUTO: 90.7 FL (ref 79–97)
MONOCYTES # BLD AUTO: 0.31 10*3/MM3 (ref 0.1–0.9)
MONOCYTES NFR BLD AUTO: 8.8 % (ref 5–12)
NEUTROPHILS NFR BLD AUTO: 2.56 10*3/MM3 (ref 1.7–7)
NEUTROPHILS NFR BLD AUTO: 72.5 % (ref 42.7–76)
NITRITE UR QL STRIP: NEGATIVE
NRBC BLD AUTO-RTO: 0 /100 WBC (ref 0–0.2)
PH UR STRIP.AUTO: 6 [PH] (ref 5–8)
PLATELET # BLD AUTO: 177 10*3/MM3 (ref 140–450)
PMV BLD AUTO: 9 FL (ref 6–12)
POTASSIUM SERPL-SCNC: 4.4 MMOL/L (ref 3.5–5.2)
PROT SERPL-MCNC: 6.6 G/DL (ref 6–8.5)
PROT UR QL STRIP: NEGATIVE
RBC # BLD AUTO: 3.21 10*6/MM3 (ref 4.14–5.8)
RBC # UR STRIP: ABNORMAL /HPF
REF LAB TEST METHOD: ABNORMAL
SODIUM SERPL-SCNC: 140 MMOL/L (ref 136–145)
SP GR UR STRIP: 1.01 (ref 1–1.03)
SQUAMOUS #/AREA URNS HPF: ABNORMAL /HPF
UROBILINOGEN UR QL STRIP: ABNORMAL
WBC # UR STRIP: ABNORMAL /HPF
WBC NRBC COR # BLD: 3.53 10*3/MM3 (ref 3.4–10.8)

## 2023-09-24 PROCEDURE — 83690 ASSAY OF LIPASE: CPT | Performed by: PHYSICIAN ASSISTANT

## 2023-09-24 PROCEDURE — 80053 COMPREHEN METABOLIC PANEL: CPT | Performed by: PHYSICIAN ASSISTANT

## 2023-09-24 PROCEDURE — 81001 URINALYSIS AUTO W/SCOPE: CPT | Performed by: PHYSICIAN ASSISTANT

## 2023-09-24 PROCEDURE — 96375 TX/PRO/DX INJ NEW DRUG ADDON: CPT

## 2023-09-24 PROCEDURE — 85025 COMPLETE CBC W/AUTO DIFF WBC: CPT | Performed by: PHYSICIAN ASSISTANT

## 2023-09-24 PROCEDURE — 25010000002 METOCLOPRAMIDE PER 10 MG: Performed by: PHYSICIAN ASSISTANT

## 2023-09-24 PROCEDURE — 99283 EMERGENCY DEPT VISIT LOW MDM: CPT

## 2023-09-24 PROCEDURE — 25010000002 ONDANSETRON PER 1 MG: Performed by: PHYSICIAN ASSISTANT

## 2023-09-24 PROCEDURE — 87086 URINE CULTURE/COLONY COUNT: CPT | Performed by: PHYSICIAN ASSISTANT

## 2023-09-24 PROCEDURE — 25010000002 CEFTRIAXONE PER 250 MG: Performed by: PHYSICIAN ASSISTANT

## 2023-09-24 PROCEDURE — 96365 THER/PROPH/DIAG IV INF INIT: CPT

## 2023-09-24 RX ORDER — SODIUM CHLORIDE 0.9 % (FLUSH) 0.9 %
10 SYRINGE (ML) INJECTION AS NEEDED
Status: DISCONTINUED | OUTPATIENT
Start: 2023-09-24 | End: 2023-09-25 | Stop reason: HOSPADM

## 2023-09-24 RX ORDER — METOCLOPRAMIDE HYDROCHLORIDE 5 MG/ML
5 INJECTION INTRAMUSCULAR; INTRAVENOUS ONCE
Status: COMPLETED | OUTPATIENT
Start: 2023-09-24 | End: 2023-09-24

## 2023-09-24 RX ORDER — ONDANSETRON 2 MG/ML
4 INJECTION INTRAMUSCULAR; INTRAVENOUS ONCE
Status: COMPLETED | OUTPATIENT
Start: 2023-09-24 | End: 2023-09-24

## 2023-09-24 RX ADMIN — METOCLOPRAMIDE 5 MG: 5 INJECTION, SOLUTION INTRAMUSCULAR; INTRAVENOUS at 20:41

## 2023-09-24 RX ADMIN — SODIUM CHLORIDE 1000 ML: 9 INJECTION, SOLUTION INTRAVENOUS at 20:42

## 2023-09-24 RX ADMIN — CEFTRIAXONE SODIUM 1000 MG: 1 INJECTION, POWDER, FOR SOLUTION INTRAMUSCULAR; INTRAVENOUS at 23:16

## 2023-09-24 RX ADMIN — ONDANSETRON 4 MG: 2 INJECTION INTRAMUSCULAR; INTRAVENOUS at 20:00

## 2023-09-24 RX ADMIN — SODIUM CHLORIDE 1000 ML: 9 INJECTION, SOLUTION INTRAVENOUS at 23:16

## 2023-09-24 NOTE — ED NOTES
Patient from home via EMS reporting nausea since this morning. Nausea began after patient took a new antibiotic, he is currently being treated for a UTI. Patient states he has taken 2 doses of the sulfamethoxazole. Patient can only hear out of right ear, has a history of esophageal cancer, speech is at baseline.

## 2023-09-24 NOTE — ED NOTES
"Pt was placed on sulfamethoxazole two days ago and has been \"retching\" ever since. No complaints of pain. Speech is at baseline for patient.   "

## 2023-09-25 ENCOUNTER — HOSPITAL ENCOUNTER (OUTPATIENT)
Dept: PHYSICAL THERAPY | Facility: HOSPITAL | Age: 73
Setting detail: THERAPIES SERIES
Discharge: HOME | End: 2023-09-25
Payer: MEDICARE

## 2023-09-25 DIAGNOSIS — I89.0 LYMPHEDEMA: Primary | ICD-10-CM

## 2023-09-25 PROCEDURE — 97535 SELF CARE MNGMENT TRAINING: CPT

## 2023-09-25 RX ORDER — CEFDINIR 300 MG/1
300 CAPSULE ORAL 2 TIMES DAILY
Qty: 14 CAPSULE | Refills: 0 | Status: SHIPPED | OUTPATIENT
Start: 2023-09-25

## 2023-09-25 RX ORDER — ONDANSETRON 4 MG/1
4 TABLET, ORALLY DISINTEGRATING ORAL EVERY 8 HOURS PRN
Qty: 12 TABLET | Refills: 0 | Status: SHIPPED | OUTPATIENT
Start: 2023-09-25

## 2023-09-25 NOTE — DISCHARGE INSTRUCTIONS
Home, rest, medicine as directed, keep well-hydrated, stop the Bactrim DS (sulfamethoxazole/trimethoprim), home medicine as prescribed, follow up with PCP for recheck. Return to care with further concerns.

## 2023-09-25 NOTE — THERAPY DISCHARGE NOTE
Outpatient Physical Therapy Lymphedema Progress Note/Discharge Summary  [facility]     Patient Name: Jose Hurtado  : 1950  MRN: [id number]  Today's Date: 2023      Visit Date: 2023    Visit Dx:    ICD-10-CM ICD-9-CM   1. Lymphedema  I89.0 457.1       Patient Active Problem List   Diagnosis    History of esophageal cancer    Adenomatous polyp of colon    Malignant neoplasm of prostate    RLS (restless legs syndrome)    History of DVT (deep vein thrombosis)    Recurrent major depressive disorder, in partial remission    Hypertension    Anemia    Insomnia due to other mental disorder    Peripheral polyneuropathy    B12 deficiency    Lymphedema    Iron deficiency    Gastroparesis    History of recurrent deep vein thrombosis (DVT)    Tremor of both hands    Gastrointestinal hemorrhage    Acute blood loss anemia    Pancytopenia    Chronic venous hypertension due to DVT    Bleeding hemorrhoid    Malabsorption of iron    Iron deficiency anemia    History of esophageal cancer    Abnormal CT scan    Generalized weakness    Chronic anticoagulation    CKD (chronic kidney disease) stage 2, GFR 60-89 ml/min    NSTEMI (non-ST elevated myocardial infarction)    Bronchitis    Dyspnea    Elevated troponin    Atrial fibrillation    Chronic obstructive pulmonary disease    Congestive heart failure    Gait instability    Dark stools    Proctitis    Severe malnutrition    Other symptoms and signs concerning food and fluid intake    Weight loss    Dehydration         Lymphedema       Row Name 23 0900             Subjective Pain    Able to rate subjective pain? yes  -KA      Pre-Treatment Pain Level 0  -KA      Subjective Pain Comment No pain in legs  -KA         Subjective    Subjective Comments Pt reports that he had an allergic reaction to antibiotics from UTI, just got discharged from the hospital.  Recommends short treatment as he needs to go home to sleep.  States L leg is as thin as it's ever been, no  "issues donning compression garments.  -KA         LLIS - Physical Concerns    The amount of pain associated with my lymphedema is: 0  -KA      The amount of limb heaviness associated with my lymphedema is: 2  -KA      The amount of skin tightness associated with my lymphedema is: 2  -KA      The size of my swollen limb(s) seems: 0  -KA      Lymphedema affects the movement of my swollen limb(s): 2  -KA      The strength in my swollen limb(s) is: 1  -KA         LLIS - Psychosocial Concerns    Lymphedema affects my body image (i.e., \"how I think I look\"). 1  -KA      Lymphedema affects my socializing with others. 0  -KA      Lymphedema affects my intimate relations with spouse or partner (rate 0 if not applicable 0  -KA      Lymphedema \"gets me down\" (i.e., depression, frustration, or anger) 1  -KA      I must rely on others for help due to my lymphedema. 0  -KA      I know what to do to manage my lymphedema 0  -KA         LLIS - Functional Concerns    Lymphedema affects my ability to perform self-care activities (i.e. eating, dressing, hygiene) 0  -KA      Lymphedema affects my ability to perform routine home or work-related activities. 1  -KA      Lymphedema affects my performance of preferred leisure activities. 1  -KA      Lymphedema affects proper fit of clothing/shoes 0  -KA      Lymphedema affects my sleep 2  -KA         Lymphedema Measurements    Measurement Type(s) Circumferential  -KA      Circumferential Areas Lower extremities  -KA         BLE Circumferential (cm)    Measurement Location 1 Knee  -KA      Left 1 38.4 cm  -KA      Right 1 36.7 cm  -KA      Measurement Location 2 10cm below knee  -KA      Left 2 35.8 cm  -KA      Right 2 36.5 cm  -KA      Measurement Location 3 20cm below knee  -KA      Left 3 34.8 cm  -KA      Right 3 34.3 cm  -KA      Measurement Location 4 30cm below knee  -KA      Left 4 29.2 cm  -KA      Right 4 27.8 cm  -KA      Measurement Location 5 Ankle  -KA      Left 5 29.8 cm  -KA  "     Right 5 27.4 cm  -KA      Measurement Location 6 Midfoot  -KA      Left 6 26 cm  -KA      Right 6 27.3 cm  -KA      LLE Circumferential Total 194 cm  -KA      RLE Circumferential Total 190 cm  -KA         Manual Lymphatic Drainage    Manual Lymphatic Drainage Comments No MLD  -KA         Compression/Skin Care    Compression Garment Comments Pt wearing Mediven Dual Layer compression system 30-40 mmHg, size XL, able to don and doff independently  -KA         Lymphedema Life Impact Scale Totals    A.  Total Q1 - Q17 (Do not include Q18) 13  -KA      B.  Total number of questions answered (Q1-Q17) 17  -KA      C. Divide A by B 0.76  -KA      D. Multiple C by 25 19  -KA                User Key  (r) = Recorded By, (t) = Taken By, (c) = Cosigned By      Initials Name Provider Type    Rubina Amaya, PT Physical Therapist                                   PT Assessment/Plan       Row Name 09/25/23 1034          PT Assessment    Functional Limitations Limitation in home management;Performance in leisure activities;Performance in self-care ADL  -KA     Impairments Edema;Gait;Impaired lymphatic circulation;Integumentary integrity;Posture  -KA     Assessment Comments Pt has met all functional goals at this time.  With 2 weeks of CDT treatment, he has seen net decreased edema of 39.3 cm LLE and 28.5 cm RLE, and his score on the LLIS has decreased from 47 to 19 indicating decreased disability related to lymphedema.  Pt requesting short treatment due to just being discharged from hospital with unrelated health concern, reviewed self-management (see education).  Pt to discharge to independent self management at this time, return to lymphedema clinic for re-evaluation as needed, 6 month recheck scheduled based on history of lymphedema needs.  -KA        PT Plan    PT Frequency Other (comment)  -KA     Predicted Duration of Therapy Intervention (PT) recheck 3/11/24, earlier if needed.  -KA     PT Plan Comments Discharge to  self management.  Recheck on 3/11/24, earlier if indicated.  -KA               User Key  (r) = Recorded By, (t) = Taken By, (c) = Cosigned By      Initials Name Provider Type    Rubina Amaya, MANDA Physical Therapist                          OP Exercises       Row Name 09/25/23 0900             Subjective    Subjective Comments Pt reports that he had an allergic reaction to antibiotics from UTI, just got discharged from the hospital.  Recommends short treatment as he needs to go home to sleep.  States L leg is as thin as it's ever been, no issues donning compression garments.  -KA         Subjective Pain    Able to rate subjective pain? yes  -KA      Pre-Treatment Pain Level 0  -KA      Subjective Pain Comment No pain in legs  -KA                User Key  (r) = Recorded By, (t) = Taken By, (c) = Cosigned By      Initials Name Provider Type    Rubina Amaya, PT Physical Therapist                                   PT OP Goals       Row Name 09/25/23 1000          PT Short Term Goals    STG Date to Achieve 09/25/23  -KA     STG 1 Pt demo decreased net edema of >/=10-20cm for decreased edema symptoms, decreased risk of infection, and improved skin care.  -KA     STG 1 Progress Met  -KA     STG 1 Progress Comments Decreased net edema 39.3 cm LLE, 28.5 cm RLE  -KA     STG 2 Pt will purchase new compression garments as indicated for his condition, based on current girth measurements.  -KA     STG 2 Progress Met  -KA        Long Term Goals    LTG Date to Achieve 10/02/23  -KA     LTG 1 Pt/family independent with compression garments as indicated for self-management of condition.  -KA     LTG 1 Progress Met  -KA               User Key  (r) = Recorded By, (t) = Taken By, (c) = Cosigned By      Initials Name Provider Type    Rubina Amaya PT Physical Therapist                    Therapy Education  Education Details: Discussed girth measurements and LLIS change with treatment.  Pt instructed to wear Medi  Dual Layer system daily, don first thing in the morning, remove at night.  When new garments arrive, he should arely the newest garment on the tag so he knows which is most recently purchased and compare old garments to new.  If older garments feel comparable to new in regards to compression, he can interchange them freely.  If newer garments feel significantly stronger, he should throw away old garments and contact therapist for assistance in ordering another pair of new garments if desired.  Follow-up set for 3/11/24 at which time therapist can reassess him and help him order new garments or provide short duration treatment to keep edema controlled before ordering new garments.  Pt to call if he needs anything prior to that follow up or if swelling gets out of control.  Given: Edema management  Program: New  How Provided: Verbal  Provided to: Patient  Level of Understanding: Verbalized  71140 - PT Self Care/Mgmt Minutes: 16              Time Calculation:   Start Time: 0915  Stop Time: 0933  Time Calculation (min): 18 min  Timed Charges  18467 - PT Self Care/Mgmt Minutes: 16  Total Minutes  Timed Charges Total Minutes: 16   Total Minutes: 16     Therapy Charges for Today       Code Description Service Date Service Provider Modifiers Qty    [id number] HC PT SELF CARE/MGMT/TRAIN EA 15 MIN 9/25/2023 Rubina Powers, PT GP, KX 1                   OP PT Discharge Summary  Reason for Discharge: All goals achieved  Outcomes Achieved: Able to achieve all goals within established timeline  Discharge Destination: Home with home program  Discharge Instructions/Additional Comments: See education from 9/25 note.      Rubina Powers, PT  9/25/2023

## 2023-09-25 NOTE — ED PROVIDER NOTES
EMERGENCY DEPARTMENT ENCOUNTER    Room Number:  04/04  Date of encounter:  9/25/2023  PCP: Brandin Bolton MD  Patient Care Team:  Brandin Bolton MD as PCP - General (Internal Medicine)  George Phelan II, MD as Consulting Physician (Hematology and Oncology)  Jose Inman MD as Consulting Physician (Urology)  Mulugeta Millan MD as Consulting Physician (Gastroenterology)  Jose Christine III, MD as Referring Physician (Thoracic Surgery)  Seth Randhawa MD as Consulting Physician (Ophthalmology)  James Cyr MD as Consulting Physician (Cardiology)  Stephon Sommers MD as Consulting Physician (Sleep Medicine)  Rolanda Solomon MD as Consulting Physician (Nephrology)   Independent Historians: Patient and spouse    HPI:  Chief Complaint: Nausea and vomiting  A complete HPI/ROS/PMH/PSH/SH/FH are unobtainable due to: N/A    Chronic or social conditions impacting patient care (social determinants of health): None    Context: Jose Hurtado is a 73 y.o. male with past medical history of partial esophagus ectomy s/p esophageal cancer, GERD, gastroparesis and HTN, HLD, AF on Eliquis, and lymphedema who arrives to the ED with complaint of nausea and vomiting.  Patient states that he started to get sick a couple of days ago with what he believes was a UTI, saw his doctor and was placed on Bactrim.  Patient states that he is taken 2 doses of the Bactrim and he started to have an upset stomach with nausea and vomiting.  Patient thinks that perhaps it is the antibiotic that is upsetting his stomach and he will have occasional flares like this with his gastroparesis where he is unable to tolerate anything by mouth.  Denies any abdominal pain, no fever, no diarrhea, chest pain, or shortness of breath.    Review of prior external notes (non-ED): Seen at urgent care yesterday for nausea and vomiting.    Review of prior external test results outside of this encounter: Patient had a gastric emptying study on 8/8/2023  with Dr. Valencia that showed delayed gastric emptying.    PAST MEDICAL HISTORY  Active Ambulatory Problems     Diagnosis Date Noted    History of esophageal cancer 02/11/2016    Adenomatous polyp of colon 02/11/2016    Malignant neoplasm of prostate 02/11/2016    RLS (restless legs syndrome) 02/11/2016    History of DVT (deep vein thrombosis) 03/22/2016    Recurrent major depressive disorder, in partial remission 01/26/2017    Hypertension 04/12/2018    Anemia 04/18/2019    Insomnia due to other mental disorder 06/06/2019    Peripheral polyneuropathy 06/06/2019    B12 deficiency 06/07/2019    Lymphedema 01/14/2020    Iron deficiency 02/10/2020    Gastroparesis 02/15/2020    History of recurrent deep vein thrombosis (DVT) 03/24/2020    Tremor of both hands 07/27/2020    Gastrointestinal hemorrhage 12/15/2020    Acute blood loss anemia 12/16/2020    Pancytopenia 12/16/2020    Chronic venous hypertension due to DVT 12/16/2020    Bleeding hemorrhoid 12/17/2020    Malabsorption of iron 01/05/2021    Iron deficiency anemia 01/05/2021    History of esophageal cancer 05/11/2021    Abnormal CT scan 05/11/2021    Generalized weakness 05/11/2021    Chronic anticoagulation 05/11/2021    CKD (chronic kidney disease) stage 2, GFR 60-89 ml/min 05/12/2021    NSTEMI (non-ST elevated myocardial infarction) 05/12/2022    Bronchitis 05/13/2022    Dyspnea 12/16/2022    Elevated troponin 01/03/2023    Atrial fibrillation 01/03/2023    Chronic obstructive pulmonary disease 01/04/2023    Congestive heart failure 02/10/2023    Gait instability 05/12/2023    Dark stools 06/24/2023    Proctitis 07/29/2023    Severe malnutrition 08/04/2023    Other symptoms and signs concerning food and fluid intake 08/05/2023    Weight loss 08/06/2023    Dehydration 08/09/2023     Resolved Ambulatory Problems     Diagnosis Date Noted    Dysphagia 02/11/2016    Duodenal ulcer 02/11/2016    Decreased body weight 02/11/2016    Cough     Hyperkalemia 02/15/2016     DVT (deep venous thrombosis) 08/24/2017    Other hyperlipidemia 01/30/2018    Anti-phospholipid syndrome 05/10/2018    Postsurgical malabsorption 10/25/2019    Chronic obstructive pulmonary disease with acute exacerbation 12/16/2020    Pleuritic chest pain 05/11/2021    Urinary tract infection due to extended-spectrum beta lactamase (ESBL) producing Escherichia coli 05/11/2021    Dysphagia 11/10/2021    PVC's (premature ventricular contractions) 03/16/2022    RSV (acute bronchiolitis due to respiratory syncytial virus) 01/03/2023    Acute respiratory failure with hypoxia 01/04/2023    Nausea and vomiting 07/29/2023    Acute urinary retention 07/29/2023    Nausea 08/05/2023    Dysphagia 08/05/2023     Past Medical History:   Diagnosis Date    Acute deep vein thrombosis (DVT) of popliteal vein of left lower extremity 03/24/2020    Bladder disorder     Bleeding hemorrhoids     Chronic kidney disease     Community acquired pneumonia     COPD (chronic obstructive pulmonary disease)     Deep venous thrombosis 2006, 2008    Depression     Esophageal carcinoma 12/31/2014    Hemorrhoids     HH (hiatus hernia)     History of atrial fibrillation 2015    History of kidney stones     History of nephrolithiasis     History of pancreatitis     History of radiation therapy     History of transfusion     Long-term (current) use of anticoagulants, INR goal 2.0-3.0     Restless legs syndrome     Sleep apnea     Squamous carcinoma        The patient has started, but not completed, their COVID-19 vaccination series.    PAST SURGICAL HISTORY  Past Surgical History:   Procedure Laterality Date    APPENDECTOMY  1950    BRONCHOSCOPY      (Diagnostic)    CARDIAC CATHETERIZATION N/A 5/14/2022    Procedure: Left Heart Cath;  Surgeon: Eric So MD;  Location: [facility] INVASIVE LOCATION;  Service: Cardiology;  Laterality: N/A;    CARDIAC CATHETERIZATION N/A 5/14/2022    Procedure: Coronary angiography;  Surgeon: Judah  Eric ARRIOLA MD;  Location: [facility] CATH INVASIVE LOCATION;  Service: Cardiology;  Laterality: N/A;    CARDIAC CATHETERIZATION N/A 5/14/2022    Procedure: Left ventriculography;  Surgeon: Eric So MD;  Location: [facility] CATH INVASIVE LOCATION;  Service: Cardiology;  Laterality: N/A;    CATARACT EXTRACTION Bilateral 2014    COLONOSCOPY  12/15/2014    Complete / Description: EH, IH, torts, stool, follow-up colonoscopy due in 5 years.    COLONOSCOPY N/A 6/13/2017    non-thrombosed external hemorrhoids, normal examined ileum, IH    COLONOSCOPY N/A 2/26/2019    Procedure: COLONOSCOPY with Cold Polypectomy;  Surgeon: Mulugeta Millan MD;  Location: [facility] ENDOSCOPY;  Service: Gastroenterology    COLONOSCOPY N/A 12/16/2020    Procedure: COLONOSCOPY to cecum into TI;  Surgeon: Mesha Sanchez MD;  Location: [facility] ENDOSCOPY;  Service: Gastroenterology;  Laterality: N/A;  pre: lower GI bleed  post: hemorrhoids     CYSTOSCOPY W/ LASER LITHOTRIPSY      ENDOSCOPY N/A 6/13/2017    Procedure: ESOPHAGOGASTRODUODENOSCOPY WITH COLD BIOPSY;  Surgeon: Lake Gonzalez MD;  Location: [facility] ENDOSCOPY;  Service:     ENDOSCOPY N/A 11/18/2021    Procedure: ESOPHAGOGASTRODUODENOSCOPY WITH  DILATATION WITH FLUOROSCOPY;  Surgeon: Jose Christine III, MD;  Location: [facility] ENDOSCOPY;  Service: Gastroenterology;  Laterality: N/A;  Pre: dysphagia, h/x of adinocarcinoma of esophagus  Post: same    ENDOSCOPY N/A 8/7/2023    Procedure: ESOPHAGOGASTRODUODENOSCOPY;  Surgeon: Jameel Valencia MD;  Location: [facility] ENDOSCOPY;  Service: Gastroenterology;  Laterality: N/A;  PRE- DYSPHAGIA  POST-ABORTED DUE TO RETAINED FOOD    ENDOSCOPY N/A 8/8/2023    Procedure: ESOPHAGOGASTRODUODENOSCOPY with bx;  Surgeon: Jameel Valencia MD;  Location: [facility] ENDOSCOPY;  Service: Gastroenterology;  Laterality: N/A;  pre: N/V, abd pain  post: esophageal nodule, retained food    ESOPHAGECTOMY      April 2015, stage IIB esophageal carcinoma,  sub-total resection.    ESOPHAGECTOMY      Esophagectomy Subtotal Andrea Joe Procedure    EXCISION LESION  2012    Removal of Squamous Cell CA on Head    HAMMER TOE REPAIR  2014    Hammertoe Operation (Each Toe), 10/2014    HAMMER TOE REPAIR Left 10/3/2017    Procedure: Left second third and fourth distal interphalangeal joint resection with flexor tenotomy;  Surgeon: Mulugeta Lira MD;  Location: [facility] OR OSC;  Service:     HEMORRHOIDECTOMY N/A 2020    Procedure: HEMORRHOIDECTOMY;  Surgeon: Billy Julio Jr., MD;  Location: [facility] MAIN OR;  Service: General;  Laterality: N/A;    HERNIA REPAIR      incisional    JEJUNOSTOMY      Laparoscopic    JEJUNOSTOMY      tube removal     KNEE SURGERY Bilateral , ,     PATELLA SURGERY Left     removed    PILONIDAL CYST / SINUS EXCISION      MA ARTHRP KNE CONDYLE&PLATU MEDIAL&LAT COMPARTMENTS Right 3/26/2018    Procedure: TOTAL KNEE ARTHROPLASTY;  Surgeon: Renny Solis MD;  Location: [facility] MAIN OR;  Service: Orthopedics    PROSTATECTOMY      PYLOROPLASTY      SIGMOIDOSCOPY N/A 2023    Procedure: SIGMOIDOSCOPY FLEXIBLE;  Surgeon: Mesha Sanchez MD;  Location: [facility] ENDOSCOPY;  Service: Gastroenterology;  Laterality: N/A;  PRE- ABNORMAL CT  POST- HEMORRHOIDS, ULCERATION    SPINAL FUSION  1998    C 5,6    TONSILLECTOMY      UPPER GASTROINTESTINAL ENDOSCOPY  12/15/2014    LA Grade D esophagitis, Pardo's, HH, multiple duodenal ulcers    VENTRAL/INCISIONAL HERNIA REPAIR N/A 2016    Procedure: VENTRAL/INCISIONAL HERNIA REPAIR, open, with mesh, and component separation;  Surgeon: Darren Rivas MD;  Location: [facility] MAIN OR;  Service:          FAMILY HISTORY  Family History   Problem Relation Age of Onset    Cerebral aneurysm Mother         cerebral artery aneurysm ( age 56)    Anxiety disorder Father     Suicide Attempts Father          of suicide    Cancer Father         bladder    Prostate cancer Brother  "68    No Known Problems Brother     Pancreatic cancer Nephew     Colon cancer Neg Hx     Esophageal cancer Neg Hx     Dementia Neg Hx     Malig Hyperthermia Neg Hx          SOCIAL HISTORY  Social History     Socioeconomic History    Marital status:      Spouse name: Lena    Number of children: 0    Years of education: College   Tobacco Use    Smoking status: Former     Packs/day: 2.00     Years: 3.00     Pack years: 6.00     Types: Cigarettes     Quit date: 1974     Years since quittin.7    Smokeless tobacco: Former     Types: Chew    Tobacco comments:     Caffeine - coffee and soda    Vaping Use    Vaping Use: Never used   Substance and Sexual Activity    Alcohol use: Yes     Alcohol/week: 3.0 standard drinks     Types: 1 Glasses of wine, 1 Cans of beer, 1 Shots of liquor per week     Comment: 2    Drug use: Never     Comment: hx marijuana use \"a long time ago\"    Sexual activity: Defer     Partners: Male         ALLERGIES  Patient has no known allergies.        REVIEW OF SYSTEMS  Review of Systems     All systems reviewed and negative except for those discussed in HPI.       PHYSICAL EXAM    I have reviewed the triage vital signs and nursing notes.    ED Triage Vitals   Temp Heart Rate Resp BP SpO2   23   98 °F (36.7 °C) 85 16 125/75 96 %      Temp src Heart Rate Source Patient Position BP Location FiO2 (%)   -- -- -- -- --              Physical Exam    GENERAL: alert and oriented x4, not distressed  HENT: normocephalic, atraumatic, dry mucous membranes  EYES: no scleral icterus, PERRL, EOMI  CV: regular rhythm, regular rate, no murmurs, rubs, or gallops  RESPIRATORY: normal effort, CTAB  ABDOMEN: soft/nontender, no rebound or guarding, normal bowel sounds  MUSCULOSKELETAL: no deformity  NEURO: alert, moves all extremities, no focal neuro deficits, follows commands  SKIN: warm, dry, no rash   Psych: Appropriate mood and " affect      Nursing notes and vital signs reviewed      LAB RESULTS  Recent Results (from the past 24 hour(s))   Comprehensive Metabolic Panel    Collection Time: 09/24/23  7:58 PM    Specimen: Blood   Result Value Ref Range    Glucose 137 (H) 65 - 99 mg/dL    BUN 26 (H) 8 - 23 mg/dL    Creatinine 1.29 (H) 0.76 - 1.27 mg/dL    Sodium 140 136 - 145 mmol/L    Potassium 4.4 3.5 - 5.2 mmol/L    Chloride 107 98 - 107 mmol/L    CO2 23.2 22.0 - 29.0 mmol/L    Calcium 8.3 (L) 8.6 - 10.5 mg/dL    Total Protein 6.6 6.0 - 8.5 g/dL    Albumin 3.5 3.5 - 5.2 g/dL    ALT (SGPT) 14 1 - 41 U/L    AST (SGOT) 17 1 - 40 U/L    Alkaline Phosphatase 91 39 - 117 U/L    Total Bilirubin 0.3 0.0 - 1.2 mg/dL    Globulin 3.1 gm/dL    A/G Ratio 1.1 g/dL    BUN/Creatinine Ratio 20.2 7.0 - 25.0    Anion Gap 9.8 5.0 - 15.0 mmol/L    eGFR 57.8 (L) >60.0 mL/min/1.73   Lipase    Collection Time: 09/24/23  7:58 PM    Specimen: Blood   Result Value Ref Range    Lipase 53 13 - 60 U/L   CBC Auto Differential    Collection Time: 09/24/23  7:58 PM    Specimen: Blood   Result Value Ref Range    WBC 3.53 3.40 - 10.80 10*3/mm3    RBC 3.21 (L) 4.14 - 5.80 10*6/mm3    Hemoglobin 9.5 (L) 13.0 - 17.7 g/dL    Hematocrit 29.1 (L) 37.5 - 51.0 %    MCV 90.7 79.0 - 97.0 fL    MCH 29.6 26.6 - 33.0 pg    MCHC 32.6 31.5 - 35.7 g/dL    RDW 13.8 12.3 - 15.4 %    RDW-SD 46.4 37.0 - 54.0 fl    MPV 9.0 6.0 - 12.0 fL    Platelets 177 140 - 450 10*3/mm3    Neutrophil % 72.5 42.7 - 76.0 %    Lymphocyte % 16.7 (L) 19.6 - 45.3 %    Monocyte % 8.8 5.0 - 12.0 %    Eosinophil % 0.8 0.3 - 6.2 %    Basophil % 0.6 0.0 - 1.5 %    Immature Grans % 0.6 (H) 0.0 - 0.5 %    Neutrophils, Absolute 2.56 1.70 - 7.00 10*3/mm3    Lymphocytes, Absolute 0.59 (L) 0.70 - 3.10 10*3/mm3    Monocytes, Absolute 0.31 0.10 - 0.90 10*3/mm3    Eosinophils, Absolute 0.03 0.00 - 0.40 10*3/mm3    Basophils, Absolute 0.02 0.00 - 0.20 10*3/mm3    Immature Grans, Absolute 0.02 0.00 - 0.05 10*3/mm3    nRBC 0.0 0.0 -  0.2 /100 WBC   Urinalysis With Microscopic If Indicated (No Culture) - Urine, Clean Catch    Collection Time: 09/24/23 10:43 PM    Specimen: Urine, Clean Catch   Result Value Ref Range    Color, UA Yellow Yellow, Straw    Appearance, UA Cloudy (A) Clear    pH, UA 6.0 5.0 - 8.0    Specific Gravity, UA 1.012 1.005 - 1.030    Glucose, UA Negative Negative    Ketones, UA Negative Negative    Bilirubin, UA Negative Negative    Blood, UA Moderate (2+) (A) Negative    Protein, UA Negative Negative    Leuk Esterase, UA Large (3+) (A) Negative    Nitrite, UA Negative Negative    Urobilinogen, UA 0.2 E.U./dL 0.2 - 1.0 E.U./dL   Urinalysis, Microscopic Only - Urine, Clean Catch    Collection Time: 09/24/23 10:43 PM    Specimen: Urine, Clean Catch   Result Value Ref Range    RBC, UA Too Numerous to Count (A) None Seen, 0-2 /HPF    WBC, UA Too Numerous to Count (A) None Seen, 0-2 /HPF    Bacteria, UA None Seen None Seen /HPF    Squamous Epithelial Cells, UA 0-2 None Seen, 0-2 /HPF    Hyaline Casts, UA None Seen None Seen /LPF    Methodology Automated Microscopy        Ordered the above labs and independently reviewed and interpreted the results by me.        RADIOLOGY  No Radiology Exams Resulted Within Past 24 Hours    I ordered the above noted radiological studies.  These were independently interpreted and reviewed by me.  See dictation for official radiology interpretation.      PROCEDURES    Procedures      MEDICATIONS GIVEN IN ER    Medications   sodium chloride 0.9 % flush 10 mL (has no administration in time range)   ondansetron (ZOFRAN) injection 4 mg (4 mg Intravenous Given 9/24/23 2000)   sodium chloride 0.9 % bolus 1,000 mL (0 mL Intravenous Stopped 9/24/23 2112)   metoclopramide (REGLAN) injection 5 mg (5 mg Intravenous Given 9/24/23 2041)   sodium chloride 0.9 % bolus 1,000 mL (0 mL Intravenous Stopped 9/25/23 0129)   cefTRIAXone (ROCEPHIN) 1,000 mg in sodium chloride 0.9 % 100 mL IVPB-VTB (0 mg Intravenous Stopped  9/25/23 0057)         PROGRESS, DATA ANALYSIS, CONSULTS, AND MEDICAL DECISION MAKING    All labs have been independently reviewed by me.  All radiology studies have been reviewed by me and discussed with radiologist dictating the report.   EKG's independently viewed and interpreted by me.  Discussion below represents my analysis of pertinent findings related to patient's condition, differential diagnosis, treatment plan and final disposition.    DDx:  Includes, but is not limited to UTI, sepsis, electrolyte imbalance, dehydration, gastroparesis, gastroenteritis    After my initial assessment I am concerned about dehydration and patient may need hospitalization due to IV fluids.    ED Course as of 09/25/23 0131   Sun Sep 24, 2023   2048 WBC: 3.53 [WHITNEY]   2048 Hemoglobin(!): 9.5 [WHITNEY]   2048 Hematocrit(!): 29.1 [WHITNEY]   2048 Platelets: 177 [WHITNEY]   2048 Glucose(!): 137 [WHITNEY]   2049 BUN(!): 26 [WHITNEY]   2049 Creatinine(!): 1.29 [WHITNEY]   2049 Sodium: 140 [WHITNEY]   2049 Potassium: 4.4 [WHITNEY]   2049 Chloride: 107 [WHITNEY]   2049 CO2: 23.2 [WHITNEY]   2049 Lipase: 53 [WHITNEY]   2259 Leukocytes, UA(!): Large (3+) [WHITNEY]   2259 RBC, UA(!): Too Numerous to Count [WHITNEY]   2259 WBC, UA(!): Too Numerous to Count [WHITNEY]   Mon Sep 25, 2023   0116 Patient rechecked, has received his antibiotics and fluids and is feeling much improved and requesting discharge at this time.  After shared decision-making discussion, patient is comfortable with plan for discharge. [WHITNEY]      ED Course User Index  [WHITNEY] Darian Javier PA       MDM: Patient's blood work shows a mild ALEC and an acute UTI.  Have given patient IV fluids and antibiotics and symptoms are improved.  Patient is tolerating p.o., he was offered admission but declined and after shared decision-making discussion patient feels comfortable with plan for discharge home.  Patient has been given strict return to ER precautions, vital signs are stable, patient is safe for discharge home.    PPE:  The patient wore a mask  and I wore a mask and all appropriate PPE throughout the entire patient encounter.      AS OF 01:31 EDT VITALS:    BP - 125/75  HR - 85  TEMP - 98 °F (36.7 °C)  O2 SATS - 96%      DIAGNOSIS  Final diagnoses:   Nausea and vomiting, unspecified vomiting type   Gastroparesis   Urinary tract infection with hematuria, site unspecified   Chronic kidney disease, unspecified CKD stage   Anemia, unspecified type         DISPOSITION  DISCHARGE    Patient discharged in stable condition.    Reviewed implications of results, diagnosis, meds, responsibility to follow up, warning signs and symptoms of possible worsening, potential complications and reasons to return to ER.    Patient/Family voiced understanding of above instructions.    Discussed plan for discharge, as there is no emergent indication for admission. Patient referred to primary care provider for BP management due to today's BP. Pt/family is agreeable and understands need for follow up and repeat testing.  Pt is aware that discharge does not mean that nothing is wrong but it indicates no emergency is present that requires admission and they must continue care with follow-up as given below or physician of their choice.     FOLLOW-UP  Brandin Bolton MD  [address] [address]  [phone]    Schedule an appointment as soon as possible for a visit in 3 days           Medication List        New Prescriptions      cefdinir 300 MG capsule  Commonly known as: OMNICEF  Take 1 capsule by mouth 2 (Two) Times a Day.     ondansetron ODT 4 MG disintegrating tablet  Commonly known as: ZOFRAN-ODT  Place 1 tablet on the tongue Every 8 (Eight) Hours As Needed for Nausea or Vomiting.               Where to Get Your Medications        These medications were sent to [facility] [id number] - [place], [place] [address] [facility] [phone] [facility] [phone]   [address] [facility] [address]      Phone: [phone]    cefdinir 300 MG capsule  ondansetron ODT 4 MG disintegrating tablet           Note Disclaimer: At [facility], we believe that sharing information builds trust and better relationships. You are receiving this note because you recently visited [facility]. It is possible you will see health information before a provider has talked with you about it. This kind of information can be easy to misunderstand. To help you fully understand what it means for your health, we urge you to discuss this note with your provider.       Darian Javier PA  09/25/23 0131

## 2023-09-25 NOTE — ED PROVIDER NOTES
MD ATTESTATION NOTE    The ALCIDES and I have discussed this patient's history, physical exam, and treatment plan.  I have reviewed the documentation and personally had a face to face interaction with the patient. I affirm the documentation and agree with the treatment and plan.  The attached note describes my personal findings.          Brief HPI: Patient presents for evaluation of nausea with vomiting today.  He was recently diagnosed with UTI and started on a course of oral Bactrim.  He has had a couple doses of that medicine but developed nausea and vomiting today.  He verifies for me that he did not have any nausea or vomiting prior to initiation of the antibiotics.  Additional medical problems include gastroparesis and previous history of esophagectomy    PHYSICAL EXAM  ED Triage Vitals   Temp Heart Rate Resp BP SpO2   09/24/23 1928 09/24/23 1927 09/24/23 1927 09/24/23 1927 09/24/23 1927   98 °F (36.7 °C) 85 16 125/75 96 %      Temp src Heart Rate Source Patient Position BP Location FiO2 (%)   -- -- -- -- --                GENERAL: Sitting up in bed, no acute distress  HENT: nares patent, normocephalic, atraumatic  EYES: no scleral icterus, normal conjunctivae  CV: Normal pulses, normal rate  RESPIRATORY: normal effort, no stridor  ABDOMEN: soft,, nontender in all 4 quadrants.  No masses.  MUSCULOSKELETAL: no deformity  NEURO: alert, moves all extremities, follows commands  PSYCH:  calm, cooperative  SKIN: warm, dry    Vital signs and nursing notes reviewed.        Plan: We will begin some IV fluids and antiemetics for the patient.  We will assess his hematologic and metabolic profiles and confirm with urinalysis the diagnosis of UTI that was made earlier in the outpatient setting.  Currently patient is nontoxic-appearing and not vomiting.  Afebrile.  No abdominal pain.  No clinical indication for CT imaging of the abdomen or other radiology studies at present       Irvin Sam MD  09/24/23 9018

## 2023-09-26 LAB — BACTERIA SPEC AEROBE CULT: NORMAL

## 2023-09-27 ENCOUNTER — APPOINTMENT (OUTPATIENT)
Dept: PHYSICAL THERAPY | Facility: HOSPITAL | Age: 73
End: 2023-09-27
Payer: MEDICARE

## 2023-09-27 ENCOUNTER — OFFICE VISIT (OUTPATIENT)
Dept: INTERNAL MEDICINE | Age: 73
End: 2023-09-27
Payer: MEDICARE

## 2023-09-27 ENCOUNTER — TELEPHONE (OUTPATIENT)
Dept: GASTROENTEROLOGY | Facility: CLINIC | Age: 73
End: 2023-09-27
Payer: MEDICARE

## 2023-09-27 VITALS
BODY MASS INDEX: 19.84 KG/M2 | WEIGHT: 168 LBS | HEIGHT: 77 IN | DIASTOLIC BLOOD PRESSURE: 70 MMHG | SYSTOLIC BLOOD PRESSURE: 130 MMHG | TEMPERATURE: 97.5 F | OXYGEN SATURATION: 98 % | HEART RATE: 81 BPM

## 2023-09-27 DIAGNOSIS — N39.0 URINARY TRACT INFECTION WITHOUT HEMATURIA, SITE UNSPECIFIED: ICD-10-CM

## 2023-09-27 DIAGNOSIS — K31.84 GASTROPARESIS: Primary | ICD-10-CM

## 2023-09-27 DIAGNOSIS — R11.0 NAUSEA: ICD-10-CM

## 2023-09-27 DIAGNOSIS — R63.4 ABNORMAL WEIGHT LOSS: ICD-10-CM

## 2023-09-27 PROCEDURE — 1159F MED LIST DOCD IN RCRD: CPT | Performed by: INTERNAL MEDICINE

## 2023-09-27 PROCEDURE — 3078F DIAST BP <80 MM HG: CPT | Performed by: INTERNAL MEDICINE

## 2023-09-27 PROCEDURE — 99214 OFFICE O/P EST MOD 30 MIN: CPT | Performed by: INTERNAL MEDICINE

## 2023-09-27 PROCEDURE — 1160F RVW MEDS BY RX/DR IN RCRD: CPT | Performed by: INTERNAL MEDICINE

## 2023-09-27 PROCEDURE — 3075F SYST BP GE 130 - 139MM HG: CPT | Performed by: INTERNAL MEDICINE

## 2023-09-27 NOTE — TELEPHONE ENCOUNTER
CALLER: Jose Hurtado     RELATIONSHIP TO PATIENT: Self    BEST CALL BACK NUMBER: [phone]    CALL REGARDING:   NO Appetite, Stomach is Always Full and Fill Stuffed, and Can Only Eat 1 to 2 Bites of Food and Still Be Hungry or Not Hungry at All    Patient is scheduled to see Dr. Millan on Tues. 10/17/2023 at 11:00am and would like to know if he can be seen sooner    Patient request a Call Back     The ABCs of the Annual Wellness Visit  Subsequent Medicare Wellness Visit    Chief Complaint   Patient presents with   • Medicare Wellness-subsequent       Subjective   History of Present Illness:  Adrian Balderrama is a 76 y.o. male who presents for a Subsequent Medicare Wellness Visit.  Here for reg f/u- he's been doing a lot of social avoidance- feels good.  Chronic sinus congestion- used Flonase in the past with good results, would like to resume.  Something causes ankles itching - no rash, happens about once a month.  Did not think the Cialis 10 mg helped much.      HEALTH RISK ASSESSMENT    Recent Hospitalizations:  No hospitalization(s) within the last year.    Current Medical Providers:  Patient Care Team:  Dior Garsia MD as PCP - General (Internal Medicine)  Vj Sanders MD as Consulting Physician (Urology)    Smoking Status:  Social History     Tobacco Use   Smoking Status Former Smoker   Smokeless Tobacco Never Used       Alcohol Consumption:  Social History     Substance and Sexual Activity   Alcohol Use No   • Frequency: Never       Depression Screen:   PHQ-2/PHQ-9 Depression Screening 2/4/2021   Little interest or pleasure in doing things 0   Feeling down, depressed, or hopeless 0   Trouble falling or staying asleep, or sleeping too much 0   Feeling tired or having little energy 0   Poor appetite or overeating 0   Feeling bad about yourself - or that you are a failure or have let yourself or your family down 0   Trouble concentrating on things, such as reading the newspaper or watching television 0   Moving or speaking so slowly that other people could have noticed. Or the opposite - being so fidgety or restless that you have been moving around a lot more than usual 0   Thoughts that you would be better off dead, or of hurting yourself in some way 0   Total Score 0       Fall Risk Screen:  STEADI Fall Risk Assessment was completed, and patient is at LOW risk for falls.Assessment completed  on:2/4/2021    Health Habits and Functional and Cognitive Screening:  Functional & Cognitive Status 2/4/2021   Do you have difficulty preparing food and eating? No   Do you have difficulty bathing yourself, getting dressed or grooming yourself? No   Do you have difficulty using the toilet? No   Do you have difficulty moving around from place to place? No   Do you have trouble with steps or getting out of a bed or a chair? No   Current Diet Limited Junk Food   Dental Exam Up to date   Eye Exam Not up to date   Exercise (times per week) 7 times per week   Current Exercises Include Bicycling Outdoors;Stationary Bicycling/Spin Class   Current Exercise Activities Include -   Do you need help using the phone?  No   Are you deaf or do you have serious difficulty hearing?  No   Do you need help with transportation? No   Do you need help shopping? No   Do you need help preparing meals?  No   Do you need help with housework?  No   Do you need help with laundry? No   Do you need help taking your medications? No   Do you need help managing money? No   Do you ever drive or ride in a car without wearing a seat belt? No   Have you felt unusual stress, anger or loneliness in the last month? No   Who do you live with? Alone   If you need help, do you have trouble finding someone available to you? No   Have you been bothered in the last four weeks by sexual problems? No   Do you have difficulty concentrating, remembering or making decisions? No         Does the patient have evidence of cognitive impairment? No    Asprin use counseling:Does not need ASA (and currently is not on it)    Age-appropriate Screening Schedule:  Refer to the list below for future screening recommendations based on patient's age, sex and/or medical conditions. Orders for these recommended tests are listed in the plan section. The patient has been provided with a written plan.    Health Maintenance   Topic Date Due   • TDAP/TD VACCINES (1 - Tdap) 03/17/1963   •  ZOSTER VACCINE (2 of 3) 03/08/2011   • INFLUENZA VACCINE  Completed          The following portions of the patient's history were reviewed and updated as appropriate: allergies, current medications, past family history, past medical history, past social history, past surgical history and problem list.    Outpatient Medications Prior to Visit   Medication Sig Dispense Refill   • ALPRAZolam (XANAX) 0.5 MG tablet Take 1 tablet by mouth Daily As Needed for Anxiety. Prn flighting. 10 tablet 0   • celecoxib (CeleBREX) 100 MG capsule TAKE ONE CAPSULE BY MOUTH TWICE A  capsule 0   • FLUoxetine (PROzac) 40 MG capsule TAKE ONE CAPSULE BY MOUTH DAILY 90 capsule 0   • hydroCHLOROthiazide (HYDRODIURIL) 12.5 MG tablet TAKE ONE TABLET BY MOUTH DAILY AS NEEDED 90 tablet 0   • lisinopril (PRINIVIL,ZESTRIL) 40 MG tablet TAKE ONE TABLET BY MOUTH TWICE A  tablet 0   • loperamide (IMODIUM) 2 MG capsule Take 1 capsule by mouth As Needed for Diarrhea. Can take up to 6 tablets daily 180 capsule 5   • pramipexole (MIRAPEX) 0.25 MG tablet TAKE 3 TO 4 TABLETS BY MOUTH DAILY AS NEEDED 360 tablet 0   • tamsulosin (FLOMAX) 0.4 MG capsule 24 hr capsule Take two capsules by mouth every evening 180 capsule 0   • tadalafil (CIALIS) 10 MG tablet Take 1 tablet by mouth Daily As Needed for Erectile Dysfunction. 10 tablet 3     No facility-administered medications prior to visit.        Patient Active Problem List   Diagnosis   • Depression   • RLS (restless legs syndrome)   • Arthritis   • Essential hypertension   • LUANN on CPAP   • Primary osteoarthritis of right knee   • Prostate cancer screening   • S/P hip replacement, left   • Urinary retention   • Malignant neoplasm of descending colon (CMS/HCC)   • erectile dysfunction       Advanced Care Planning:  ACP discussion was held with the patient during this visit. Patient has an advance directive (not in EMR), copy requested.    Review of Systems   Constitutional: Positive for activity  "change and unexpected weight change (surprised how much he gained this past year).   HENT: Positive for congestion and rhinorrhea.    Respiratory: Negative for chest tightness and shortness of breath.    Cardiovascular: Negative for chest pain.   Gastrointestinal: Negative for abdominal pain.   Endocrine: Negative for cold intolerance.   Genitourinary: Negative for difficulty urinating.   Neurological: Negative for headaches.   Psychiatric/Behavioral: Nervous/anxious: only with flying, other rare occ associated with health.        Compared to one year ago, the patient feels his physical health is the same.  Compared to one year ago, the patient feels his mental health is the same.    Reviewed chart for potential of high risk medication in the elderly: yes  Reviewed chart for potential of harmful drug interactions in the elderly:yes    Objective         Vitals:    02/04/21 1340   BP: 132/74   Pulse: 80   Temp: 97.5 °F (36.4 °C)   Weight: 100 kg (221 lb)   Height: 185.4 cm (73\")       Body mass index is 29.16 kg/m².  Discussed the patient's BMI with him. The BMI is above average; BMI management plan is completed.    Physical Exam  Constitutional:       General: He is not in acute distress.  Cardiovascular:      Rate and Rhythm: Normal rate and regular rhythm.   Musculoskeletal:      Right lower leg: No edema.      Left lower leg: No edema.   Neurological:      General: No focal deficit present.   Psychiatric:         Mood and Affect: Mood normal.         Behavior: Behavior normal.         Thought Content: Thought content normal.         Judgment: Judgment normal.               Assessment/Plan   Medicare Risks and Personalized Health Plan  CMS Preventative Services Quick Reference  Immunizations Discussed/Encouraged (specific immunizations; Shingrix )    The above risks/problems have been discussed with the patient.  Pertinent information has been shared with the patient in the After Visit Summary.  Follow up plans and " orders are seen below in the Assessment/Plan Section.    Diagnoses and all orders for this visit:    1. Prostate cancer screening (Primary)  Comments:  sees Dr. Sanders for screening    2. Reactive depression  Comments:  does great with fluoxetine for many years- no interest in change    3. Arthritis  Comments:  continue to be active and take Celebrex prn.    4. LUANN on CPAP  Comments:  continue nightly use.     5. Essential hypertension  Comments:  well controlled.     6. Fear of flying  Comments:  refill alprazolam for prn use.     7. Medicare annual wellness visit, subsequent  Comments:  He feels he is doing well- encouraged to remain active and watch diet, he is up 15+ lbs!      Other orders  -     fluticasone (Flonase) 50 MCG/ACT nasal spray; 2 sprays into the nostril(s) as directed by provider Daily.  Dispense: 18.2 mL; Refill: 3  -     tadalafil (CIALIS) 20 MG tablet; Take 1 tablet by mouth Daily As Needed for Erectile Dysfunction.  Dispense: 10 tablet; Refill: 1      Follow Up:  Return in about 6 months (around 8/4/2021) for Lab Today.     An After Visit Summary and PPPS were given to the patient.

## 2023-09-27 NOTE — PROGRESS NOTES
"    I N T E R N A L  M E D I C I N E    J U N O H  K I M,  M D      ENCOUNTER DATE:  09/27/2023    Jose FITCH Hurtado / 73 y.o. / male    CHIEF COMPLAINT / REASON FOR OFFICE VISIT     Gastroparesis with abnormal weight loss      ASSESSMENT & PLAN     1. Gastroparesis    2. Nausea    3. Abnormal weight loss    4. Urinary tract infection without hematuria, site unspecified      No orders of the defined types were placed in this encounter.    No orders of the defined types were placed in this encounter.      SUMMARY/DISCUSSION  Advised him to take Reglan 2-3 times daily as scheduled  Advised to supplement nutrition with Boost/Ensure twice daily  Follow-up with GI as scheduled regarding gastroparesis   Complete course of cefdinir as prescribed from ER   Aspiration precautions discussed. Consider getting an adjustable platform bed.   He was instructed to call or return if the condition is not improving or worsening and he expressed understanding and agreed to follow above instructions.          Next Appointment with me: 12/27/2023    Return in about 3 months (around 12/27/2023) for Reassess chronic medical problems.        VITAL SIGNS     Vitals:    09/27/23 1507   BP: 130/70   Pulse: 81   Temp: 97.5 °F (36.4 °C)   SpO2: 98%   Weight: 76.2 kg (168 lb)   Height: 195.6 cm (77\")       BP Readings from Last 3 Encounters:   09/27/23 130/70   09/24/23 125/75   08/25/23 124/72     Wt Readings from Last 3 Encounters:   09/27/23 76.2 kg (168 lb)   08/17/23 81.3 kg (179 lb 3.2 oz)   08/17/23 81.1 kg (178 lb 12.8 oz)     Body mass index is 19.92 kg/m².    Blood pressure readings recorded on patient flowsheet:       No data to display                MEDICATIONS AT THE TIME OF OFFICE VISIT     Current Outpatient Medications on File Prior to Visit   Medication Sig    albuterol sulfate  (90 Base) MCG/ACT inhaler Inhale 1 puff Every 4 (Four) Hours As Needed for Wheezing.    atorvastatin (LIPITOR) 40 MG tablet Take 1 tablet by mouth " Every Night.    cefdinir (OMNICEF) 300 MG capsule Take 1 capsule by mouth 2 (Two) Times a Day.    cyanocobalamin 1000 MCG/ML injection INJECT 1 ML INTRAMUSCULARLY ONCE EVERY 30 DAYS AS DIRECTED    Eliquis 5 MG tablet tablet TAKE ONE TABLET BY MOUTH EVERY 12 HOURS    furosemide (LASIX) 40 MG tablet Take 1 tablet by mouth Daily. Indications: Cardiac Failure, Edema    metoclopramide (Reglan) 5 MG tablet Take 1 tablet by mouth 3 (Three) Times a Day Before Meals.    ondansetron ODT (ZOFRAN-ODT) 4 MG disintegrating tablet Place 1 tablet on the tongue Every 8 (Eight) Hours As Needed for Nausea or Vomiting.    pantoprazole (PROTONIX) 40 MG EC tablet TAKE ONE TABLET BY MOUTH TWICE A DAY BEFORE A MEAL    PARoxetine (PAXIL) 40 MG tablet TAKE ONE TABLET BY MOUTH EVERY MORNING    polyethylene glycol (MIRALAX) 17 g packet Take 17 g by mouth Daily.    potassium chloride (MICRO-K) 10 MEQ CR capsule Take 1 capsule by mouth Daily.    pramipexole (MIRAPEX) 1.5 MG tablet TAKE ONE TABLET BY MOUTH TWICE A DAY    tamsulosin (FLOMAX) 0.4 MG capsule 24 hr capsule Take 1 capsule by mouth Daily.    tiotropium bromide monohydrate (Spiriva Respimat) 2.5 MCG/ACT aerosol solution inhaler Inhale 2 puffs Daily.    tiotropium bromide-olodaterol (Stiolto Respimat) 2.5-2.5 MCG/ACT aerosol solution inhaler Inhale 2.5 puffs Daily. Indications: Chronic Bronchitis, Chronic Obstructive Lung Disease    metoprolol tartrate (LOPRESSOR) 50 MG tablet Take 1 tablet by mouth 2 (Two) Times a Day for 60 days.    [DISCONTINUED] bisacodyl (DULCOLAX) 5 MG EC tablet Take 1 tablet by mouth Daily. (Patient not taking: Reported on 9/27/2023)    [DISCONTINUED] castor oil-balsam peru (VENELEX) ointment Apply 1 application  topically to the appropriate area as directed Every 12 (Twelve) Hours. (Patient not taking: Reported on 9/27/2023)    [DISCONTINUED] Gemtesa 75 MG tablet Daily. Indications: Urinary Incontinence (Patient not taking: Reported on 9/27/2023)     [DISCONTINUED] sennosides-docusate (PERICOLACE) 8.6-50 MG per tablet Take 2 tablets by mouth 2 (Two) Times a Day. (Patient not taking: Reported on 9/27/2023)     No current facility-administered medications on file prior to visit.          HISTORY OF PRESENT ILLNESS     Patient was treated for a UTI 10 days ago at [facility]. His catheter was removed, and he is urinating well. He was given Bactrim and developed nausea. He presented to the emergency room on 09/26/2023 and was given a new antibiotic. He reports loss of appetite with weight loss and sensation of abdominal fullness. Bowel movements are normal. He was taking Reglan twice a day but has not taken it in 2 days. He continues Protonix twice a day. He is no longer taking Colace or Gemtesa. Patient has not followed up with his GI since his hospitalization at [facility]. He confirms a history of aspiration.    Patient lives with his wife who is in good health.        REVIEW OF SYSTEMS     Ongoing weight loss   No fever or chills  No chest pain or shortness of breath  No abdominal pain   Nausea with history of aspiration   No melena or blood in stool         PHYSICAL EXAMINATION     Physical Exam  Weight loss noted  No acute distress , does not appear acutely ill   Cardiovascular: Normal rate, regular rhythm.  Pulm/Chest: Effort normal, breath sounds normal.  Abdomen: Soft and nontender.       REVIEWED DATA     Labs:     Lab Results   Component Value Date     09/24/2023    K 4.4 09/24/2023    CALCIUM 8.3 (L) 09/24/2023    AST 17 09/24/2023    ALT 14 09/24/2023    BUN 26 (H) 09/24/2023    CREATININE 1.29 (H) 09/24/2023    CREATININE 1.31 (H) 08/10/2023    CREATININE 1.08 08/09/2023    EGFRIFNONA 62 06/04/2021    EGFRIFAFRI 75 06/04/2021       Lab Results   Component Value Date    HGBA1C 5.50 08/06/2021    HGBA1C 6.20 (H) 01/17/2020       Lab Results   Component Value Date    LDL 47 06/23/2023    LDL 52 05/12/2023    LDL 98 06/02/2022    HDL 62  (H) 06/23/2023    HDL 52 05/12/2023    TRIG 36 06/23/2023    TRIG 49 05/12/2023       Lab Results   Component Value Date    TSH 1.600 05/12/2023    TSH 2.710 01/17/2020    TSH 2.050 06/06/2019    FREET4 1.36 05/12/2023    FREET4 1.52 01/17/2020    FREET4 1.18 06/06/2019       Lab Results   Component Value Date    WBC 3.53 09/24/2023    HGB 9.5 (L) 09/24/2023     09/24/2023       No results found for: MALBCRERATIO         Imaging:     NM Gastric Emptying (08/09/2023 12:04)   Delayed gastric emptying.     CT Abdomen Pelvis With Contrast (07/29/2023 13:20)   1. Rectal wall thickening with surrounding stranding/inflammation  consistent with focal colitis/proctitis.  2. Moderate distention of the urinary bladder. There is also hydroureter  and mild hydronephrosis with distention of the renal pelvises without  evidence for obstructing stone. Small nonobstructing renal stones are  present.  3. Previous esophagectomy with gastric pull-through. Chronic loculated  right basal pleural effusion with surrounding pleural thickening,  similar to prior exam 03/29/2023.      Medical Tests:     UPPER GI ENDOSCOPY (08/08/2023 08:48)   - An esophagi-gastric anastomosis was found, characterized by inflammation. Biopsied.  - A large amount of food (residue) in the stomach.  - Normal examined duodenum.    FLEXIBLE SIGMOIDOSCOPY (08/02/2023 16:22)   - 2 ulcers in the rectum and in the recto-sigmoid colon. Biopsy is contraindicated.  - Non-bleeding internal hemorrhoids.  - No specimens collected.      Summary of old records / correspondence / consultant report:     ED (09/24/2023)     ED to Hosp-Admission (Discharged) with Carloz Ortiz MD; Escobar Duvall MD (08/05/2023)     Request outside records:     Transcribed from ambient dictation for Brandin Bolton MD by Astrid Goins.  09/27/23   16:11 EDT    Patient or patient representative verbalized consent to the visit recording.  I have personally performed the services  described in this document as transcribed by the above individual, and it is both accurate and complete.  Brandin Bolton MD  9/27/2023  16:16 EDT

## 2023-09-28 ENCOUNTER — OFFICE VISIT (OUTPATIENT)
Dept: GASTROENTEROLOGY | Facility: CLINIC | Age: 73
End: 2023-09-28
Payer: MEDICARE

## 2023-09-28 VITALS
WEIGHT: 167.2 LBS | DIASTOLIC BLOOD PRESSURE: 70 MMHG | HEIGHT: 77 IN | TEMPERATURE: 97.5 F | BODY MASS INDEX: 19.74 KG/M2 | HEART RATE: 90 BPM | SYSTOLIC BLOOD PRESSURE: 131 MMHG | OXYGEN SATURATION: 94 %

## 2023-09-28 DIAGNOSIS — K31.84 GASTROPARESIS: Primary | ICD-10-CM

## 2023-09-28 DIAGNOSIS — Z85.01 PERSONAL HISTORY OF ESOPHAGEAL CANCER: ICD-10-CM

## 2023-09-28 PROCEDURE — 3078F DIAST BP <80 MM HG: CPT | Performed by: INTERNAL MEDICINE

## 2023-09-28 PROCEDURE — 1159F MED LIST DOCD IN RCRD: CPT | Performed by: INTERNAL MEDICINE

## 2023-09-28 PROCEDURE — 1160F RVW MEDS BY RX/DR IN RCRD: CPT | Performed by: INTERNAL MEDICINE

## 2023-09-28 PROCEDURE — 99214 OFFICE O/P EST MOD 30 MIN: CPT | Performed by: INTERNAL MEDICINE

## 2023-09-28 PROCEDURE — 3075F SYST BP GE 130 - 139MM HG: CPT | Performed by: INTERNAL MEDICINE

## 2023-09-28 NOTE — PROGRESS NOTES
Chief Complaint   Patient presents with    Gastroparesis        Jose Hurtado is a  73 y.o. male here for a follow up visit for gastroparesis, history of esophageal cancer    HPI this 73-year-old male patient of Dr. Brandin Bolton presents in follow-up since last seen in August of this year.  He had undergone upper endoscopy that same month because of dysphagia and was found to have retained gastric contents.  He did have a previous history of esophageal cancer with resection and gastric pull-through and anastomosis.  He has been followed by the thoracic service for this.  He is on low-dose metoclopramide 5 mg twice daily.  He continues to have symptoms related to filling up quickly and early satiety.  We talked about adjustment of his diet to a low residue diet with smaller more frequent meals and lots of liquids.  If his symptoms persist despite this adjustment we can consider increasing his Reglan regimen to 3 times daily.  He is not having any nocturnal symptoms.  He will call with a progress report within the next month.    Past Medical History:   Diagnosis Date    Acute deep vein thrombosis (DVT) of popliteal vein of left lower extremity 03/24/2020    Anemia     Anti-phospholipid syndrome     On lifelong anticoagulation therapy    Bladder disorder     LEAKAGE   ON MED  wers pads    Bleeding hemorrhoids     Chronic kidney disease     Community acquired pneumonia     HISTORY OF IN 2014    Congestive heart failure     COPD (chronic obstructive pulmonary disease)     Deep venous thrombosis 2006, 2008    Left lower extremity multiple    Depression     Esophageal carcinoma 12/31/2014    had chemo and radiation prior surgery    Gastroparesis     Hemorrhoids     HH (hiatus hernia)     History of atrial fibrillation 2015    ONE EPISODE WHILE HOSPITALIZED    History of kidney stones     History of nephrolithiasis     History of pancreatitis     PT STATES MANY YEARS AGO    History of radiation therapy     History of  transfusion     Hypertension     Long-term (current) use of anticoagulants, INR goal 2.0-3.0     Lymphedema     Malignant neoplasm of prostate     Other hyperlipidemia 01/30/2018 January 30, 2018 lipid panel risk 12.8%    Restless legs syndrome     Sleep apnea     OCCASIONALLY WEARS CPAP    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      atorvastatin (LIPITOR) 40 MG tablet Take 1 tablet by mouth Every Night. 90 tablet 3    cefdinir (OMNICEF) 300 MG capsule Take 1 capsule by mouth 2 (Two) Times a Day. 14 capsule 0    cyanocobalamin 1000 MCG/ML injection INJECT 1 ML INTRAMUSCULARLY ONCE EVERY 30 DAYS AS DIRECTED 1 mL 11    Eliquis 5 MG tablet tablet TAKE ONE TABLET BY MOUTH EVERY 12 HOURS 60 tablet 1    furosemide (LASIX) 40 MG tablet Take 1 tablet by mouth Daily. Indications: Cardiac Failure, Edema      metoclopramide (Reglan) 5 MG tablet Take 1 tablet by mouth 3 (Three) Times a Day Before Meals. 90 tablet 1    ondansetron ODT (ZOFRAN-ODT) 4 MG disintegrating tablet Place 1 tablet on the tongue Every 8 (Eight) Hours As Needed for Nausea or Vomiting. 12 tablet 0    pantoprazole (PROTONIX) 40 MG EC tablet TAKE ONE TABLET BY MOUTH TWICE A DAY BEFORE A MEAL 180 tablet 3    PARoxetine (PAXIL) 40 MG tablet TAKE ONE TABLET BY MOUTH EVERY MORNING 30 tablet 5    polyethylene glycol (MIRALAX) 17 g packet Take 17 g by mouth Daily. 30 each 0    potassium chloride (MICRO-K) 10 MEQ CR capsule Take 1 capsule by mouth Daily. 90 capsule 5    pramipexole (MIRAPEX) 1.5 MG tablet TAKE ONE TABLET BY MOUTH TWICE A  tablet 1    tamsulosin (FLOMAX) 0.4 MG capsule 24 hr capsule Take 1 capsule by mouth Daily. 30 capsule 0    tiotropium bromide monohydrate (Spiriva Respimat) 2.5 MCG/ACT aerosol solution inhaler Inhale 2 puffs Daily. 4 g 2    tiotropium  "bromide-olodaterol (Stiolto Respimat) 2.5-2.5 MCG/ACT aerosol solution inhaler Inhale 2.5 puffs Daily. Indications: Chronic Bronchitis, Chronic Obstructive Lung Disease      metoprolol tartrate (LOPRESSOR) 50 MG tablet Take 1 tablet by mouth 2 (Two) Times a Day for 60 days. 60 tablet 1     No current facility-administered medications for this visit.       PRN Meds:.    Allergies   Allergen Reactions    Sulfamethoxazole-Trimethoprim Nausea Only       Social History     Socioeconomic History    Marital status:      Spouse name: Lena    Number of children: 0    Years of education: College   Tobacco Use    Smoking status: Former     Packs/day: 2.00     Years: 3.00     Pack years: 6.00     Types: Cigarettes     Quit date:      Years since quittin.7    Smokeless tobacco: Former     Types: Chew    Tobacco comments:     Caffeine - coffee and soda    Vaping Use    Vaping Use: Never used   Substance and Sexual Activity    Alcohol use: Yes     Alcohol/week: 3.0 standard drinks     Types: 1 Glasses of wine, 1 Cans of beer, 1 Shots of liquor per week     Comment: 2    Drug use: Never     Comment: hx marijuana use \"a long time ago\"    Sexual activity: Defer     Partners: Male       Family History   Problem Relation Age of Onset    Cerebral aneurysm Mother         cerebral artery aneurysm ( age 56)    Anxiety disorder Father     Suicide Attempts Father          of suicide    Cancer Father         bladder    Prostate cancer Brother 68    No Known Problems Brother     Pancreatic cancer Nephew     Colon cancer Neg Hx     Esophageal cancer Neg Hx     Dementia Neg Hx     Malig Hyperthermia Neg Hx        Review of Systems   Constitutional:  Negative for activity change, appetite change, fatigue and unexpected weight change.   HENT:  Negative for congestion, facial swelling, sore throat, trouble swallowing and voice change.    Eyes:  Negative for photophobia and visual disturbance.   Respiratory:  Negative for " cough and choking.    Cardiovascular:  Negative for chest pain.   Gastrointestinal:  Negative for abdominal distention, abdominal pain, anal bleeding, blood in stool, constipation, diarrhea, nausea, rectal pain and vomiting.        GERD  Gastroparesis   Endocrine: Negative for polyphagia.   Musculoskeletal:  Negative for arthralgias, gait problem and joint swelling.   Skin:  Negative for color change, pallor and rash.   Allergic/Immunologic: Negative for food allergies.   Neurological:  Negative for speech difficulty and headaches.   Hematological:  Does not bruise/bleed easily.   Psychiatric/Behavioral:  Negative for agitation, confusion and sleep disturbance.      Vitals:    09/28/23 1558   BP: 131/70   Pulse: 90   Temp: 97.5 °F (36.4 °C)   SpO2: 94%       Physical Exam  Constitutional:       Appearance: He is well-developed.   HENT:      Head: Normocephalic.   Eyes:      Conjunctiva/sclera: Conjunctivae normal.   Cardiovascular:      Rate and Rhythm: Normal rate and regular rhythm.   Pulmonary:      Breath sounds: Normal breath sounds.   Abdominal:      General: Bowel sounds are normal.      Palpations: Abdomen is soft.   Musculoskeletal:         General: Normal range of motion.      Cervical back: Normal range of motion.   Skin:     General: Skin is warm and dry.   Neurological:      Mental Status: He is alert and oriented to person, place, and time.   Psychiatric:         Behavior: Behavior normal.       ASSESSMENT   #1 history of esophageal cancer status post resection and gastric pull-through  #2 gastroparesis subsequent to surgical changes  #3 history of polyps      PLAN  Instructions on soft low residue diet and change to small more frequent meals with lots of liquids.  Patient to call with a progress report in 1 month's time.  If still symptomatic will increase Reglan to 3 times daily.  He warrants follow-up in 6 months but this will be decided upon his response to treatment.      ICD-10-CM ICD-9-CM   1.  Gastroparesis  K31.84 536.3   2. Personal history of esophageal cancer  Z85.01 V10.03

## 2023-09-29 ENCOUNTER — APPOINTMENT (OUTPATIENT)
Dept: PHYSICAL THERAPY | Facility: HOSPITAL | Age: 73
End: 2023-09-29
Payer: MEDICARE

## 2023-10-02 ENCOUNTER — TREATMENT (OUTPATIENT)
Age: 73
End: 2023-10-02
Payer: MEDICARE

## 2023-10-02 DIAGNOSIS — R29.898 DECREASED STRENGTH OF LOWER EXTREMITY: ICD-10-CM

## 2023-10-02 DIAGNOSIS — R29.898 OTHER SYMPTOMS AND SIGNS INVOLVING THE MUSCULOSKELETAL SYSTEM: Primary | ICD-10-CM

## 2023-10-02 DIAGNOSIS — Z74.09 IMPAIRED FUNCTIONAL MOBILITY, BALANCE, GAIT, AND ENDURANCE: ICD-10-CM

## 2023-10-02 DIAGNOSIS — R26.9 GAIT DISTURBANCE: ICD-10-CM

## 2023-10-02 PROCEDURE — 97530 THERAPEUTIC ACTIVITIES: CPT | Performed by: PHYSICAL THERAPIST

## 2023-10-02 PROCEDURE — 97164 PT RE-EVAL EST PLAN CARE: CPT | Performed by: PHYSICAL THERAPIST

## 2023-10-02 NOTE — PROGRESS NOTES
Physical Therapy Re-Evaluation & Plan of Care  [facility] [place]   [address] [place] [address]  P: [phone]       F: [phone]        Patient: Jose Hurtado   : 1950  Visit Diagnoses:     ICD-10-CM ICD-9-CM   1. Other symptoms and signs involving the musculoskeletal system  R29.898 729.89   2. Impaired functional mobility, balance, gait, and endurance  Z74.09 V49.89   3. Decreased strength of lower extremity  R29.898 729.89   4. Gait disturbance  R26.9 781.2     Referring practitioner: Brandin Bolton MD  Date of Initial Visit: Type: THERAPY  Noted: 2023  Today's Date: 10/2/2023  Patient seen for 12 sessions      Subjective:   Jose Hurtado reports:   Subjective Questionnaire: Oswestry: 24/50  WOMAC: 53/96  Clinical Progress: Mixed results  Home Program Compliance: No  Treatment has included: therapeutic exercise, neuromuscular re-education, manual therapy, therapeutic activity, gait training, and moist heat    Subjective   Patient reports he was in the hospital 2 times since his last PT session.  He has completed lymphedema treatment for his legs as well.  Reports he has not been doing much other than his lymphedema treatments.  States his overall strength and endurance is very low.      Objective     Active Range of Motion      Lumbar   WFL     Left Hip   Flexion: 120 degrees   Extension: 17 (Lag) degrees   Abduction: 12 degrees   External rotation (90/90): 20 degrees   Internal rotation (90/90): 35 degrees      Right Hip   Flexion: 120 degrees   Extension: 17 (Lag) degrees   Abduction: 12 degrees   External rotation (90/90): 20 degrees   Internal rotation (90/90): 35 degrees      Left Knee   Flexion: 120 degrees  Extensor la degrees      Right Knee   Flexion: 128 degrees  Extensor la degrees      Strength/Myotome Testing      Left Hip   Planes of Motion   Flexion: 4  Extension: 3  Abduction: 3  External rotation: 4  Internal rotation: 4      Right Hip   Planes of Motion   Flexion: 4  Extension: 3  Abduction: 3  External rotation: 4  Internal rotation: 4     Left Knee   Flexion: 4  Extension: 4     Right Knee   Flexion: 4  Extension: 4     Left Ankle/Foot   Dorsiflexion: 4     Right Ankle/Foot   Dorsiflexion: 4     Tests      Left Hip   Modified Jose: Positive.      Right Hip   Modified Jose: Positive.      Ambulation   Weight-Bearing Status   Assistive device used: single point cane     Observational Gait   Gait: asymmetric   Decreased walking speed.      Quality of Movement During Gait   Trunk  Forward lean.      Functional Assessment   Squat   Left valgus, left tibial anterior translation beyond toes, right valgus and right tibial anterior translation beyond toes.        5XSTS: 20.95 seconds (used UE to push up to stand and control sit)  TUG: 15.43 seconds (used SPC)    See Exercise, Manual, and Modality Logs for complete treatment.     Assessment/Plan  Patient returns to PT following hospitalization and treatment for LE lymphedema.  He continues to have bilateral hip and knee contractures, decreased LE strength, impaired gait and functional mobility.  His TUG time was improved from last assessment although his 5XSTS time was increased.  He continued to have impaired balance and gait due to strength and ROM limitations as well as increased fall risk.  He will benefit from continued PT.  Progress toward previous goals: Not Met    Goal Review  Short Term Goals (4 wks):  1.  Patient will have bilateral hip extension to 0 degrees.-ongoing  2.  Patient will have bilateral hip flexion to 125 degrees.-improved  3.  Patient will demonstrate improved posture and mechanics with ambulation using the least restrictive AD.-ongoing  4.  Patient will be independent in performance of HEP for carryover upon discharge from skilled PT services.-ongoing     Long Term Goals (8 wks):  1.  Patient will have increased hip strength to 4+/5.-improved  2.  Patient will  have improved Oswestry score of 15/50 or better.-ongoing  3.  Patient will have improved WOMAC score of 35/96 or better.-ongoing  4.  Patient will demonstrate improved squat form to all for functional squat pattern to ease burden of sit to stand from chairs/commode.-ongoing      Recommendations: 2x/wk  Timeframe: 2 months  Prognosis to achieve goals: good    PT SIGNATURE: Mervat Ramachandran PT     License Number: [id number]  Electronically signed by Mervat Ramachandran PT, 10/02/23, 1:34 PM EDT    Certification Period: 10/2/2023 thru 12/30/2023  I certify that the therapy services are furnished while this patient is under my care.  The services outlined above are required by this patient, and will be reviewed every 90 days.    Signature: __________________________________    Brandin Bolton MD   NPI: [id number]    Please sign and return via fax to [phone]. Thank you, [facility].      Timed:         Manual Therapy:         mins  91640;     Therapeutic Exercise:       mins  56784 (10 min concurrent);     Neuromuscular Wil:        mins  28722;    Therapeutic Activity:     10     mins  25685;     Gait Training:           mins  46823;     Ultrasound:          mins  32341;    Ionto                                  mins  56953  Self Care                            mins  29111  Canalith Repos         mins  75851  Orthotic MGMT/Train         mins  71206    Un-Timed:  Electrical Stimulation:         mins  82023 ( );  Dry Needling:          mins  38731 self-pay;  Dry Needling:          mins  76207 self-pay  Traction          mins  18708  Low Eval          mins  16356  Mod Eval          mins  30749  High Eval                            mins  26132  Re-eval                             20    mins  91780       Timed Treatment:   10   mins   Total Treatment:     40   mins

## 2023-10-04 ENCOUNTER — TREATMENT (OUTPATIENT)
Age: 73
End: 2023-10-04
Payer: MEDICARE

## 2023-10-04 DIAGNOSIS — R29.898 OTHER SYMPTOMS AND SIGNS INVOLVING THE MUSCULOSKELETAL SYSTEM: Primary | ICD-10-CM

## 2023-10-04 DIAGNOSIS — R29.898 DECREASED STRENGTH OF LOWER EXTREMITY: ICD-10-CM

## 2023-10-04 DIAGNOSIS — Z74.09 IMPAIRED FUNCTIONAL MOBILITY, BALANCE, GAIT, AND ENDURANCE: ICD-10-CM

## 2023-10-04 DIAGNOSIS — R26.9 GAIT DISTURBANCE: ICD-10-CM

## 2023-10-04 NOTE — PROGRESS NOTES
Physical Therapy Treatment Note  [facility] [place]   [address] [place] [address]  P: [phone]       F: [phone]      Patient: Jose Hurtado   : 1950  Treatment Diagnosis:     ICD-10-CM ICD-9-CM   1. Other symptoms and signs involving the musculoskeletal system  R29.898 729.89   2. Impaired functional mobility, balance, gait, and endurance  Z74.09 V49.89   3. Decreased strength of lower extremity  R29.898 729.89   4. Gait disturbance  R26.9 781.2     Referring practitioner: No ref. provider found  Date of Initial Visit: Type: THERAPY  Noted: 2023  Today's Date: 10/4/2023  Patient seen for 13 sessions           Subjective   Patient reports he gets tired easily.  Reports he has an injection in his left toe-being treated by the podiatrist.      Objective     See Exercise, Manual, and Modality Logs for complete treatment.       Assessment/Plan  Patient performed exercises for strengthening, endurance, dynamic balance to improve functional mobility and safety.  He did require recovery time between standing activities.  Benefits from verbal and tactile cueing for technique and to prevent compensatory patterns.   Progress per Plan of Care and Progress strengthening /stabilization /functional activity           Timed:         Manual Therapy:         mins  38887;     Therapeutic Exercise:    20     mins  85029;     Neuromuscular Wil:    10    mins  86690;    Therapeutic Activity:          mins  38214;     Gait Trainin     mins  28322;     Ultrasound:          mins  23381;    Ionto                                  mins  08294  Self Care                            mins  51362  Canalith Repos         mins  06505  Orthotic MGMT/Train         mins  69258    Un-Timed:  Electrical Stimulation:         mins  85057 ( );  Dry Needling:          mins  76101 self-pay;  Dry Needling:          mins  84053 self-pay  Traction          mins  17165      Timed  Treatment:   38   mins   Total Treatment:     38   mins        PT SIGNATURE: Mervat Ramachandran PT     License Number: [id number]  Electronically signed by Mervat Ramachandran PT, 10/04/23, 1:45 PM EDT

## 2023-10-06 ENCOUNTER — TREATMENT (OUTPATIENT)
Age: 73
End: 2023-10-06
Payer: MEDICARE

## 2023-10-06 DIAGNOSIS — Z74.09 IMPAIRED FUNCTIONAL MOBILITY, BALANCE, GAIT, AND ENDURANCE: ICD-10-CM

## 2023-10-06 DIAGNOSIS — R29.898 OTHER SYMPTOMS AND SIGNS INVOLVING THE MUSCULOSKELETAL SYSTEM: Primary | ICD-10-CM

## 2023-10-06 DIAGNOSIS — R29.898 DECREASED STRENGTH OF LOWER EXTREMITY: ICD-10-CM

## 2023-10-06 DIAGNOSIS — R26.9 GAIT DISTURBANCE: ICD-10-CM

## 2023-10-06 NOTE — PROGRESS NOTES
Physical Therapy Daily Treatment Note    [facility]  [address]  [address]  [place] [address]     Patient: Jose Hurtado   : 1950  Referring practitioner: No ref. provider found  Date of Initial Visit: Type: THERAPY  Noted: 2023  Today's Date: 10/6/2023  Patient seen for 14 sessions         Jose Hurtado reports: no new complaints, wishes to try leg press today        Subjective     Objective -ambulates in/out of clinic with straight cane and noted increased fwd flexed, kyphotic/altered from trunk positioning from midline with bilateral bent knee positioning.  Able to correct with verbal/tactile cues for short distances with standing and walking activities.  -mild dyspnea with exertion/repetitive task performance    See Exercise, Manual, and Modality Logs for complete treatment.   -increased NuStep time x 1 min  Added Leg press 3 x 10 44 lbs    **frequent rest breaks between standing stretching and partial squat activities.due to perceived exertion**    Assessment/Plan  Able to increase some of his prior level exercises/activities in clinic today with supervision and guidance for safety and proper exercise technique and performance.  Benefits from verbal/tactile cues to assist with improved upright posture and positioning with stretching and ambulation.  Overall endurance and exercise capability decreased but pt complaint/cooperative and motivated.        Progress per Plan of Care toward all goals           Timed:  Manual Therapy:         mins  99501;  Therapeutic Exercise:  2    mins  58020;     Neuromuscular Wil:        mins  42311;    Therapeutic Activity:        8  mins  56258;     Gait Training:           mins  87797;     Ultrasound:          mins  54747;      Untimed:  Electrical Stimulation:         mins  19051 ( );  Mechanical Traction:         mins  12567;     Timed Treatment:   28   mins   Total Treatment:    28    mins  Rey Sterling  PTA  Physical Therapist  Assistant  [id number]

## 2023-10-11 ENCOUNTER — TREATMENT (OUTPATIENT)
Age: 73
End: 2023-10-11
Payer: MEDICARE

## 2023-10-11 DIAGNOSIS — R29.898 OTHER SYMPTOMS AND SIGNS INVOLVING THE MUSCULOSKELETAL SYSTEM: Primary | ICD-10-CM

## 2023-10-11 DIAGNOSIS — R29.898 DECREASED STRENGTH OF LOWER EXTREMITY: ICD-10-CM

## 2023-10-11 DIAGNOSIS — R26.9 GAIT DISTURBANCE: ICD-10-CM

## 2023-10-11 DIAGNOSIS — Z74.09 IMPAIRED FUNCTIONAL MOBILITY, BALANCE, GAIT, AND ENDURANCE: ICD-10-CM

## 2023-10-11 NOTE — PROGRESS NOTES
PHYSICAL THERAPY DISCHARGE SUMMARY  [facility] [place]   [address] [place] [address]  P: [phone]       F: [phone]     Patient: Jose Hurtado   : 1950  Diagnosis/ICD-10 Code:  Other symptoms and signs involving the musculoskeletal system [R29.898]  Referring practitioner: No ref. provider found  Date of Initial Visit: Type: THERAPY  Noted: 2023  Today's Date: 10/11/2023  Patient seen for 15 sessions      Subjective:   Jose Hurtado reports:   Subjective Questionnaire: Oswestry: 24/50, WOMAC: 53/96  Clinical Progress: mixed results  Home Program Compliance: No  Treatment has included: therapeutic exercise, neuromuscular re-education, manual therapy, therapeutic activity, gait training, and moist heat    Subjective  Patient reports the wound on his left big toe is getting worse and is almost to the bone.  States the wound doctor doesn't want him on his foot.  Is now wearing a cast shoe.  States he is starting to get his endurance back a little.      Objective     Active Range of Motion      Lumbar   WFL     Left Hip   Flexion: 120 degrees   Extension: 17 (Lag) degrees   Abduction: 12 degrees   External rotation (90/90): 20 degrees   Internal rotation (90/90): 35 degrees      Right Hip   Flexion: 120 degrees   Extension: 17 (Lag) degrees   Abduction: 12 degrees   External rotation (90/90): 20 degrees   Internal rotation (90/90): 35 degrees      Left Knee   Flexion: 120 degrees  Extensor la degrees      Right Knee   Flexion: 128 degrees  Extensor la degrees      Strength/Myotome Testing      Left Hip   Planes of Motion   Flexion: 4  Extension: 3  Abduction: 3  External rotation: 4  Internal rotation: 4     Right Hip   Planes of Motion   Flexion: 4  Extension: 3  Abduction: 3  External rotation: 4  Internal rotation: 4     Left Knee   Flexion: 4  Extension: 4     Right Knee   Flexion: 4  Extension: 4     Left Ankle/Foot   Dorsiflexion: 4      Right Ankle/Foot   Dorsiflexion: 4     Tests      Left Hip   Modified Jose: Positive.      Right Hip   Modified Jose: Positive.      Ambulation   Weight-Bearing Status   Assistive device used: single point cane     Observational Gait   Gait: asymmetric   Decreased walking speed.      Quality of Movement During Gait   Trunk  Forward lean.      Functional Assessment   Squat   Left valgus, left tibial anterior translation beyond toes, right valgus and right tibial anterior translation beyond toes.        5XSTS: 20.95 seconds (used UE to push up to stand and control sit)  TUG: 15.43 seconds (used SPC)     See Exercise, Manual, and Modality Logs for complete treatment.      Assessment/Plan  Patient presents with continued ROM and strength limitations.  His lymphedema self management is inconsistent and also influences his functional mobility.  Due to the development of left toe wound, he has limited participation parameters.  Patient in agreement to hold on PT at this time and resume after he completes treatment for the wound.  Progress toward previous goals: Not Met    Goal Review  Short Term Goals:  1.  Patient will have bilateral hip extension to 0 degrees.-ongoing  2.  Patient will have bilateral hip flexion to 125 degrees.-improved  3.  Patient will demonstrate improved posture and mechanics with ambulation using the least restrictive AD.-ongoing  4.  Patient will be independent in performance of HEP for carryover upon discharge from skilled PT services.-ongoing     Long Term Goals:  1.  Patient will have increased hip strength to 4+/5.-improved  2.  Patient will have improved Oswestry score of 15/50 or better.-ongoing  3.  Patient will have improved WOMAC score of 35/96 or better.-ongoing  4.  Patient will demonstrate improved squat form to all for functional squat pattern to ease burden of sit to stand from chairs/commode.-ongoing      Recommendations: Discharge to [facility]    PT SIGNATURE: Mervat Ramachandran, PT      License Number: [id number]  Electronically signed by Mervat Ramachandran, PT, 10/11/23, 12:48 PM EDT    Timed:         Manual Therapy:         mins  61498;     Therapeutic Exercise:    30     mins  47324;     Neuromuscular Wil:        mins  05591;    Therapeutic Activity:     15     mins  29672;     Gait Training:           mins  61748;     Ultrasound:          mins  29950;    Ionto                                  mins  82273  Self Care                            mins  09713  Canalith Repos         mins  90197  Orthotic MGMT/Train         mins  68487    Un-Timed:  Electrical Stimulation:         mins  67498 ( );  Dry Needling:          mins  23101 self-pay;  Dry Needling:          mins  91254 self-pay  Traction          mins  27031  Low Eval          mins  55208  Mod Eval          mins  45731  High Eval                            mins  95198    Timed Treatment:   45   mins   Total Treatment:     45   mins

## 2023-10-16 ENCOUNTER — TELEPHONE (OUTPATIENT)
Dept: GASTROENTEROLOGY | Facility: CLINIC | Age: 73
End: 2023-10-16
Payer: MEDICARE

## 2023-10-23 DIAGNOSIS — F32.9 MAJOR DEPRESSIVE DISORDER WITH CURRENT ACTIVE EPISODE, UNSPECIFIED DEPRESSION EPISODE SEVERITY, UNSPECIFIED WHETHER RECURRENT: ICD-10-CM

## 2023-10-23 RX ORDER — PAROXETINE HYDROCHLORIDE 40 MG/1
TABLET, FILM COATED ORAL
Qty: 30 TABLET | Refills: 5 | Status: SHIPPED | OUTPATIENT
Start: 2023-10-23

## 2023-10-23 RX ORDER — APIXABAN 5 MG/1
TABLET, FILM COATED ORAL
Qty: 60 TABLET | Refills: 1 | Status: SHIPPED | OUTPATIENT
Start: 2023-10-23

## 2023-10-31 ENCOUNTER — TELEPHONE (OUTPATIENT)
Dept: GASTROENTEROLOGY | Facility: CLINIC | Age: 73
End: 2023-10-31
Payer: MEDICARE

## 2023-11-01 ENCOUNTER — TELEPHONE (OUTPATIENT)
Dept: GASTROENTEROLOGY | Facility: CLINIC | Age: 73
End: 2023-11-01
Payer: MEDICARE

## 2023-11-02 ENCOUNTER — TELEPHONE (OUTPATIENT)
Dept: INTERNAL MEDICINE | Age: 73
End: 2023-11-02

## 2023-11-02 NOTE — TELEPHONE ENCOUNTER
Caller: Jose Hurtado    Relationship: Self    Best call back number: [phone]     What medication are you requesting: GABAPENTIN     What are your current symptoms: NEUROPATHY     If a prescription is needed, what is your preferred pharmacy and phone number: [facility] [id number] - [place], [place] - [address] AT [facility] - [phone] [facility] [phone] FX     Additional notes: WANTS TO KNOW IF HE CAN GET GABAPENTIN PRESCRIBED FOR HIM PLEASE CALL AND ADVISE

## 2023-11-08 ENCOUNTER — OFFICE VISIT (OUTPATIENT)
Dept: INTERNAL MEDICINE | Age: 73
End: 2023-11-08
Payer: MEDICARE

## 2023-11-08 VITALS
HEART RATE: 79 BPM | HEIGHT: 77 IN | TEMPERATURE: 97.3 F | OXYGEN SATURATION: 99 % | SYSTOLIC BLOOD PRESSURE: 134 MMHG | BODY MASS INDEX: 19.72 KG/M2 | WEIGHT: 167 LBS | DIASTOLIC BLOOD PRESSURE: 60 MMHG

## 2023-11-08 DIAGNOSIS — Z51.81 THERAPEUTIC DRUG MONITORING: ICD-10-CM

## 2023-11-08 DIAGNOSIS — G62.9 PERIPHERAL POLYNEUROPATHY: Primary | Chronic | ICD-10-CM

## 2023-11-08 RX ORDER — GABAPENTIN 100 MG/1
100 CAPSULE ORAL 3 TIMES DAILY
Qty: 90 CAPSULE | Refills: 0 | Status: SHIPPED | OUTPATIENT
Start: 2023-11-08

## 2023-11-08 NOTE — PROGRESS NOTES
"    I N T E R N A L  M E D I C I N E    J U N O H  K I M,  M D      ENCOUNTER DATE:  11/08/2023    Jose FITCH Hurtado / 73 y.o. / male    CHIEF COMPLAINT / REASON FOR OFFICE VISIT     Insomnia and Foot Pain      ASSESSMENT & PLAN     Problem List Items Addressed This Visit          Neuro    Peripheral polyneuropathy - Primary (Chronic)    Overview     + severe B12 deficiency (6/7/19)  Continue B12 injections monthly         Relevant Medications    gabapentin (NEURONTIN) 100 MG capsule    Other Relevant Orders    Drug Screen 11 w/Conf, Serum    Compliance Drug Analysis, Ur - Urine, Clean Catch     Other Visit Diagnoses       Therapeutic drug monitoring        Relevant Orders    Drug Screen 11 w/Conf, Serum    Compliance Drug Analysis, Ur - Urine, Clean Catch          Orders Placed This Encounter   Procedures    Drug Screen 11 w/Conf, Serum    Compliance Drug Analysis, Ur - Urine, Clean Catch     New Medications Ordered This Visit   Medications    gabapentin (NEURONTIN) 100 MG capsule     Sig: Take 1 capsule by mouth 3 (Three) Times a Day.     Dispense:  90 capsule     Refill:  0       SUMMARY/DISCUSSION        Next Appointment with me: 12/27/2023    Return for Next scheduled follow up.      VITAL SIGNS     Vitals:    11/08/23 1113 11/08/23 1122   BP: 150/60 134/60   BP Location: Right arm Left arm   Pulse: 79    Temp: 97.3 °F (36.3 °C)    SpO2: 99%    Weight: 75.8 kg (167 lb)    Height: 195.6 cm (77\")        BP Readings from Last 3 Encounters:   11/08/23 134/60   09/28/23 131/70   09/27/23 130/70     Wt Readings from Last 3 Encounters:   11/08/23 75.8 kg (167 lb)   09/28/23 75.8 kg (167 lb 3.2 oz)   09/27/23 76.2 kg (168 lb)     Body mass index is 19.8 kg/m².    Blood pressure readings recorded on patient flowsheet:       No data to display                  MEDICATIONS AT THE TIME OF OFFICE VISIT     Current Outpatient Medications on File Prior to Visit   Medication Sig    albuterol sulfate  (90 Base) MCG/ACT " inhaler Inhale 1 puff Every 4 (Four) Hours As Needed for Wheezing.    atorvastatin (LIPITOR) 40 MG tablet Take 1 tablet by mouth Every Night.    cyanocobalamin 1000 MCG/ML injection INJECT 1 ML INTRAMUSCULARLY ONCE EVERY 30 DAYS AS DIRECTED    Eliquis 5 MG tablet tablet TAKE ONE TABLET BY MOUTH EVERY 12 HOURS    furosemide (LASIX) 40 MG tablet Take 1 tablet by mouth Daily. Indications: Cardiac Failure, Edema    metoclopramide (Reglan) 5 MG tablet Take 1 tablet by mouth 3 (Three) Times a Day Before Meals.    ondansetron ODT (ZOFRAN-ODT) 4 MG disintegrating tablet Place 1 tablet on the tongue Every 8 (Eight) Hours As Needed for Nausea or Vomiting.    pantoprazole (PROTONIX) 40 MG EC tablet TAKE ONE TABLET BY MOUTH TWICE A DAY BEFORE A MEAL    PARoxetine (PAXIL) 40 MG tablet TAKE ONE TABLET BY MOUTH EVERY MORNING    polyethylene glycol (MIRALAX) 17 g packet Take 17 g by mouth Daily.    potassium chloride (MICRO-K) 10 MEQ CR capsule Take 1 capsule by mouth Daily.    pramipexole (MIRAPEX) 1.5 MG tablet TAKE ONE TABLET BY MOUTH TWICE A DAY    tamsulosin (FLOMAX) 0.4 MG capsule 24 hr capsule Take 1 capsule by mouth Daily.    tiotropium bromide monohydrate (Spiriva Respimat) 2.5 MCG/ACT aerosol solution inhaler Inhale 2 puffs Daily.    tiotropium bromide-olodaterol (Stiolto Respimat) 2.5-2.5 MCG/ACT aerosol solution inhaler Inhale 2.5 puffs Daily. Indications: Chronic Bronchitis, Chronic Obstructive Lung Disease    [DISCONTINUED] cefdinir (OMNICEF) 300 MG capsule Take 1 capsule by mouth 2 (Two) Times a Day.    metoprolol tartrate (LOPRESSOR) 50 MG tablet Take 1 tablet by mouth 2 (Two) Times a Day for 60 days. (Patient not taking: Reported on 11/8/2023)     No current facility-administered medications on file prior to visit.          HISTORY OF PRESENT ILLNESS     He is going to the wound care clinic for an infection secondary to his hammertoe and is also receiving treatment for lymphedema of the bilateral lower extremities.  However, he is still experiencing neuropathy type pain in his feet and inquires if he would benefit from gabapentin. He describes the pain as sharp, stabbing, and burning. He states the pain is primarily on the plantar and dorsal aspects of his feet. He denies any associated numbness. He does not recall taking gabapentin previously but has heard it will help with restless legs as well. He currently takes pramipexole for restless legs which works well for him. Patient is no longer driving.    He confirms that he is taking his monthly B12 injections. Also inquires if he can take trazodone at night for insomnia.     He denies smoking marijuana.       Patient Care Team:  Brandin Bolton MD as PCP - General (Internal Medicine)  Tapan, George THORPE II, MD as Consulting Physician (Hematology and Oncology)  Jose Inman MD as Consulting Physician (Urology)  Mulugeta Millan MD as Consulting Physician (Gastroenterology)  Jose Christine III, MD as Referring Physician (Thoracic Surgery)  Seth Randhawa MD as Consulting Physician (Ophthalmology)  James Cyr MD as Consulting Physician (Cardiology)  Stephon Sommers MD as Consulting Physician (Sleep Medicine)  Rolanda Solomon MD as Consulting Physician (Nephrology)    REVIEW OF SYSTEMS     Review of Systems       PHYSICAL EXAMINATION     Physical Exam  Alert with normal thought and judgment.       REVIEWED DATA     Labs:     Lab Results   Component Value Date     09/24/2023    K 4.4 09/24/2023    CALCIUM 8.3 (L) 09/24/2023    AST 17 09/24/2023    ALT 14 09/24/2023    BUN 26 (H) 09/24/2023    CREATININE 1.29 (H) 09/24/2023    CREATININE 1.31 (H) 08/10/2023    CREATININE 1.08 08/09/2023    EGFRIFNONA 62 06/04/2021    EGFRIFAFRI 75 06/04/2021       Lab Results   Component Value Date    HGBA1C 5.50 08/06/2021    HGBA1C 6.20 (H) 01/17/2020       Lab Results   Component Value Date    LDL 47 06/23/2023    LDL 52 05/12/2023    LDL 98 06/02/2022    HDL 62 (H)  "06/23/2023    HDL 52 05/12/2023    TRIG 36 06/23/2023    TRIG 49 05/12/2023       Lab Results   Component Value Date    TSH 1.600 05/12/2023    TSH 2.710 01/17/2020    TSH 2.050 06/06/2019    FREET4 1.36 05/12/2023    FREET4 1.52 01/17/2020    FREET4 1.18 06/06/2019       Lab Results   Component Value Date    WBC 3.53 09/24/2023    HGB 9.5 (L) 09/24/2023     09/24/2023       No results found for: \"MALBCRERATIO\"         Imaging:           Medical Tests:           Summary of old records / correspondence / consultant report:           Request outside records:       Transcribed from ambient dictation for Brandin Bolton MD by Yary Hogan.  11/08/23   12:06 EST    Patient or patient representative verbalized consent to the visit recording.  I have personally performed the services described in this document as transcribed by the above individual, and it is both accurate and complete.  Brandin Bolton MD  11/8/2023  12:46 EST   "

## 2023-11-09 ENCOUNTER — OFFICE VISIT (OUTPATIENT)
Dept: ONCOLOGY | Facility: CLINIC | Age: 73
End: 2023-11-09
Payer: MEDICARE

## 2023-11-09 ENCOUNTER — LAB (OUTPATIENT)
Dept: OTHER | Facility: HOSPITAL | Age: 73
End: 2023-11-09
Payer: MEDICARE

## 2023-11-09 VITALS
RESPIRATION RATE: 16 BRPM | TEMPERATURE: 98.3 F | HEART RATE: 82 BPM | BODY MASS INDEX: 20.06 KG/M2 | HEIGHT: 77 IN | SYSTOLIC BLOOD PRESSURE: 116 MMHG | DIASTOLIC BLOOD PRESSURE: 64 MMHG | OXYGEN SATURATION: 96 % | WEIGHT: 169.9 LBS

## 2023-11-09 DIAGNOSIS — E61.1 IRON DEFICIENCY: ICD-10-CM

## 2023-11-09 DIAGNOSIS — E61.1 IRON DEFICIENCY: Primary | ICD-10-CM

## 2023-11-09 LAB
BASOPHILS # BLD AUTO: 0.04 10*3/MM3 (ref 0–0.2)
BASOPHILS NFR BLD AUTO: 0.6 % (ref 0–1.5)
DEPRECATED RDW RBC AUTO: 48.8 FL (ref 37–54)
EOSINOPHIL # BLD AUTO: 0.08 10*3/MM3 (ref 0–0.4)
EOSINOPHIL NFR BLD AUTO: 1.3 % (ref 0.3–6.2)
ERYTHROCYTE [DISTWIDTH] IN BLOOD BY AUTOMATED COUNT: 14.6 % (ref 12.3–15.4)
FERRITIN SERPL-MCNC: 458.2 NG/ML (ref 30–400)
HCT VFR BLD AUTO: 31 % (ref 37.5–51)
HGB BLD-MCNC: 9.4 G/DL (ref 13–17.7)
HGB RETIC QN AUTO: 28.6 PG (ref 29.8–36.1)
IMM GRANULOCYTES # BLD AUTO: 0.01 10*3/MM3 (ref 0–0.05)
IMM GRANULOCYTES NFR BLD AUTO: 0.2 % (ref 0–0.5)
IMM RETICS NFR: 10.1 % (ref 3–15.8)
IRON 24H UR-MRATE: 19 MCG/DL (ref 59–158)
IRON SATN MFR SERPL: 8 % (ref 20–50)
LYMPHOCYTES # BLD AUTO: 1.5 10*3/MM3 (ref 0.7–3.1)
LYMPHOCYTES NFR BLD AUTO: 23.5 % (ref 19.6–45.3)
MCH RBC QN AUTO: 27.7 PG (ref 26.6–33)
MCHC RBC AUTO-ENTMCNC: 30.3 G/DL (ref 31.5–35.7)
MCV RBC AUTO: 91.4 FL (ref 79–97)
MONOCYTES # BLD AUTO: 0.51 10*3/MM3 (ref 0.1–0.9)
MONOCYTES NFR BLD AUTO: 8 % (ref 5–12)
NEUTROPHILS NFR BLD AUTO: 4.23 10*3/MM3 (ref 1.7–7)
NEUTROPHILS NFR BLD AUTO: 66.4 % (ref 42.7–76)
NRBC BLD AUTO-RTO: 0 /100 WBC (ref 0–0.2)
PLATELET # BLD AUTO: 274 10*3/MM3 (ref 140–450)
PMV BLD AUTO: 9.1 FL (ref 6–12)
RBC # BLD AUTO: 3.39 10*6/MM3 (ref 4.14–5.8)
RETICS # AUTO: 0.02 10*6/MM3 (ref 0.02–0.13)
RETICS/RBC NFR AUTO: 0.61 % (ref 0.7–1.9)
TIBC SERPL-MCNC: 243 MCG/DL (ref 298–536)
TRANSFERRIN SERPL-MCNC: 163 MG/DL (ref 200–360)
VIT B12 BLD-MCNC: 615 PG/ML (ref 211–946)
WBC NRBC COR # BLD: 6.37 10*3/MM3 (ref 3.4–10.8)

## 2023-11-09 PROCEDURE — 99214 OFFICE O/P EST MOD 30 MIN: CPT | Performed by: INTERNAL MEDICINE

## 2023-11-09 PROCEDURE — 36415 COLL VENOUS BLD VENIPUNCTURE: CPT

## 2023-11-09 PROCEDURE — 83540 ASSAY OF IRON: CPT | Performed by: INTERNAL MEDICINE

## 2023-11-09 PROCEDURE — 3078F DIAST BP <80 MM HG: CPT | Performed by: INTERNAL MEDICINE

## 2023-11-09 PROCEDURE — 85025 COMPLETE CBC W/AUTO DIFF WBC: CPT | Performed by: INTERNAL MEDICINE

## 2023-11-09 PROCEDURE — 82728 ASSAY OF FERRITIN: CPT | Performed by: INTERNAL MEDICINE

## 2023-11-09 PROCEDURE — 3074F SYST BP LT 130 MM HG: CPT | Performed by: INTERNAL MEDICINE

## 2023-11-09 PROCEDURE — 1125F AMNT PAIN NOTED PAIN PRSNT: CPT | Performed by: INTERNAL MEDICINE

## 2023-11-09 PROCEDURE — 85046 RETICYTE/HGB CONCENTRATE: CPT | Performed by: INTERNAL MEDICINE

## 2023-11-09 PROCEDURE — 84466 ASSAY OF TRANSFERRIN: CPT | Performed by: INTERNAL MEDICINE

## 2023-11-09 PROCEDURE — 82607 VITAMIN B-12: CPT | Performed by: INTERNAL MEDICINE

## 2023-11-09 NOTE — PROGRESS NOTES
Subjective .     REASONS FOR FOLLOWUP:  Esophageal cancer, cytopenias     HISTORY OF PRESENT ILLNESS:  The patient is a 73 y.o. year old male  who is here for follow-up with the above-mentioned history.    Has lost about 5 more pounds.  Does not have a desire to eat.  No new issues.    Past Medical History:   Diagnosis Date    Acute deep vein thrombosis (DVT) of popliteal vein of left lower extremity 03/24/2020    Anemia     Anti-phospholipid syndrome     On lifelong anticoagulation therapy    Bladder disorder     LEAKAGE   ON MED  wers pads    Bleeding hemorrhoids     Chronic kidney disease     Community acquired pneumonia     HISTORY OF IN 2014    Congestive heart failure     COPD (chronic obstructive pulmonary disease)     Deep venous thrombosis 2006, 2008    Left lower extremity multiple    Depression     Esophageal carcinoma 12/31/2014    had chemo and radiation prior surgery    Gastroparesis     Hemorrhoids     HH (hiatus hernia)     History of atrial fibrillation 2015    ONE EPISODE WHILE HOSPITALIZED    History of kidney stones     History of nephrolithiasis     History of pancreatitis     PT STATES MANY YEARS AGO    History of radiation therapy     History of transfusion     Hypertension     Long-term (current) use of anticoagulants, INR goal 2.0-3.0     Lymphedema     Malignant neoplasm of prostate     Other hyperlipidemia 01/30/2018 January 30, 2018 lipid panel risk 12.8%    Restless legs syndrome     Sleep apnea     OCCASIONALLY WEARS CPAP    Squamous carcinoma     on the head     Past Surgical History:   Procedure Laterality Date    APPENDECTOMY  1950    BRONCHOSCOPY      (Diagnostic)    CARDIAC CATHETERIZATION N/A 05/14/2022    Procedure: Left Heart Cath;  Surgeon: Eric So MD;  Location: [facility] CATH INVASIVE LOCATION;  Service: Cardiology;  Laterality: N/A;    CARDIAC CATHETERIZATION N/A 05/14/2022    Procedure: Coronary angiography;  Surgeon: Eric So MD;  Location:  [facility] CATH INVASIVE LOCATION;  Service: Cardiology;  Laterality: N/A;    CARDIAC CATHETERIZATION N/A 05/14/2022    Procedure: Left ventriculography;  Surgeon: Eric So MD;  Location: [facility] CATH INVASIVE LOCATION;  Service: Cardiology;  Laterality: N/A;    CATARACT EXTRACTION Bilateral 2014    COLONOSCOPY  12/15/2014    Complete / Description: EH, IH, torts, stool, follow-up colonoscopy due in 5 years.    COLONOSCOPY N/A 06/13/2017    non-thrombosed external hemorrhoids, normal examined ileum, IH    COLONOSCOPY N/A 02/26/2019    Procedure: COLONOSCOPY with Cold Polypectomy;  Surgeon: Mulugeta Millan MD;  Location: [facility] ENDOSCOPY;  Service: Gastroenterology    COLONOSCOPY N/A 12/16/2020    Procedure: COLONOSCOPY to cecum into TI;  Surgeon: Mesha Sanchez MD;  Location: [facility] ENDOSCOPY;  Service: Gastroenterology;  Laterality: N/A;  pre: lower GI bleed  post: hemorrhoids     CYSTOSCOPY W/ LASER LITHOTRIPSY      ENDOSCOPY N/A 06/13/2017    Procedure: ESOPHAGOGASTRODUODENOSCOPY WITH COLD BIOPSY;  Surgeon: Lake Gonzalez MD;  Location: [facility] ENDOSCOPY;  Service:     ENDOSCOPY N/A 11/18/2021    Procedure: ESOPHAGOGASTRODUODENOSCOPY WITH  DILATATION WITH FLUOROSCOPY;  Surgeon: Jose Christine III, MD;  Location: [facility] ENDOSCOPY;  Service: Gastroenterology;  Laterality: N/A;  Pre: dysphagia, h/x of adinocarcinoma of esophagus  Post: same    ENDOSCOPY N/A 08/07/2023    Procedure: ESOPHAGOGASTRODUODENOSCOPY;  Surgeon: Jameel Valencia MD;  Location: [facility] ENDOSCOPY;  Service: Gastroenterology;  Laterality: N/A;  PRE- DYSPHAGIA  POST-ABORTED DUE TO RETAINED FOOD    ENDOSCOPY N/A 08/08/2023    Procedure: ESOPHAGOGASTRODUODENOSCOPY with bx;  Surgeon: Jameel Valencia MD;  Location: [facility] ENDOSCOPY;  Service: Gastroenterology;  Laterality: N/A;  pre: N/V, abd pain  post: esophageal nodule, retained food    ESOPHAGECTOMY      April 2015, stage IIB esophageal carcinoma, sub-total resection.     ESOPHAGECTOMY      Esophagectomy Subtotal Andrea Joe Procedure    EXCISION LESION  08/2012    Removal of Squamous Cell CA on Head    HAMMER TOE REPAIR  09/2014    Hammertoe Operation (Each Toe), 10/2014    HAMMER TOE REPAIR Left 10/03/2017    Procedure: Left second third and fourth distal interphalangeal joint resection with flexor tenotomy;  Surgeon: Mulugeta Lira MD;  Location: [facility] OR OSC;  Service:     HEMORRHOIDECTOMY N/A 12/23/2020    Procedure: HEMORRHOIDECTOMY;  Surgeon: Billy Julio Jr., MD;  Location: [facility] OR;  Service: General;  Laterality: N/A;    HERNIA REPAIR      incisional    JEJUNOSTOMY      Laparoscopic    JEJUNOSTOMY      tube removal     KNEE SURGERY Bilateral 1967, 1973, 1981    PATELLA SURGERY Left     removed    PILONIDAL CYST / SINUS EXCISION      IN ARTHRP KNE CONDYLE&PLATU MEDIAL&LAT COMPARTMENTS Right 03/26/2018    Procedure: TOTAL KNEE ARTHROPLASTY;  Surgeon: Renny Solis MD;  Location: [facility] MAIN OR;  Service: Orthopedics    PROSTATECTOMY  2010    PYLOROPLASTY      SIGMOIDOSCOPY N/A 08/02/2023    Procedure: SIGMOIDOSCOPY FLEXIBLE;  Surgeon: Mesha Sanchez MD;  Location: [facility] ENDOSCOPY;  Service: Gastroenterology;  Laterality: N/A;  PRE- ABNORMAL CT  POST- HEMORRHOIDS, ULCERATION    SPINAL FUSION  02/1998    C 5,6    TONSILLECTOMY      UPPER GASTROINTESTINAL ENDOSCOPY  12/15/2014    LA Grade D esophagitis, Pardo's, HH, multiple duodenal ulcers    VENTRAL/INCISIONAL HERNIA REPAIR N/A 04/14/2016    Procedure: VENTRAL/INCISIONAL HERNIA REPAIR, open, with mesh, and component separation;  Surgeon: Darren Rivas MD;  Location: [facility] MAIN OR;  Service:        HEMATOLOGIC/ONCOLOGIC HISTORY:  (History from previous dates can be found in the separate document.)    MEDICATIONS    Current Outpatient Medications:     albuterol sulfate  (90 Base) MCG/ACT inhaler, Inhale 1 puff Every 4 (Four) Hours As Needed for Wheezing., Disp: , Rfl:     atorvastatin  (LIPITOR) 40 MG tablet, Take 1 tablet by mouth Every Night., Disp: 90 tablet, Rfl: 3    cyanocobalamin 1000 MCG/ML injection, INJECT 1 ML INTRAMUSCULARLY ONCE EVERY 30 DAYS AS DIRECTED, Disp: 1 mL, Rfl: 11    Eliquis 5 MG tablet tablet, TAKE ONE TABLET BY MOUTH EVERY 12 HOURS, Disp: 60 tablet, Rfl: 1    furosemide (LASIX) 40 MG tablet, Take 1 tablet by mouth Daily. Indications: Cardiac Failure, Edema, Disp: , Rfl:     gabapentin (NEURONTIN) 100 MG capsule, Take 1 capsule by mouth 3 (Three) Times a Day., Disp: 90 capsule, Rfl: 0    metoclopramide (Reglan) 5 MG tablet, Take 1 tablet by mouth 3 (Three) Times a Day Before Meals., Disp: 90 tablet, Rfl: 1    ondansetron ODT (ZOFRAN-ODT) 4 MG disintegrating tablet, Place 1 tablet on the tongue Every 8 (Eight) Hours As Needed for Nausea or Vomiting., Disp: 12 tablet, Rfl: 0    pantoprazole (PROTONIX) 40 MG EC tablet, TAKE ONE TABLET BY MOUTH TWICE A DAY BEFORE A MEAL, Disp: 180 tablet, Rfl: 3    PARoxetine (PAXIL) 40 MG tablet, TAKE ONE TABLET BY MOUTH EVERY MORNING, Disp: 30 tablet, Rfl: 5    polyethylene glycol (MIRALAX) 17 g packet, Take 17 g by mouth Daily., Disp: 30 each, Rfl: 0    potassium chloride (MICRO-K) 10 MEQ CR capsule, Take 1 capsule by mouth Daily., Disp: 90 capsule, Rfl: 5    pramipexole (MIRAPEX) 1.5 MG tablet, TAKE ONE TABLET BY MOUTH TWICE A DAY, Disp: 180 tablet, Rfl: 1    tamsulosin (FLOMAX) 0.4 MG capsule 24 hr capsule, Take 1 capsule by mouth Daily., Disp: 30 capsule, Rfl: 0    tiotropium bromide monohydrate (Spiriva Respimat) 2.5 MCG/ACT aerosol solution inhaler, Inhale 2 puffs Daily., Disp: 4 g, Rfl: 2    tiotropium bromide-olodaterol (Stiolto Respimat) 2.5-2.5 MCG/ACT aerosol solution inhaler, Inhale 2.5 puffs Daily. Indications: Chronic Bronchitis, Chronic Obstructive Lung Disease, Disp: , Rfl:     metoprolol tartrate (LOPRESSOR) 50 MG tablet, Take 1 tablet by mouth 2 (Two) Times a Day for 60 days. (Patient not taking: Reported on 11/8/2023),  "Disp: 60 tablet, Rfl: 1    ALLERGIES:     Allergies   Allergen Reactions    Sulfamethoxazole-Trimethoprim Nausea Only       SOCIAL HISTORY:       Social History     Socioeconomic History    Marital status:      Spouse name: Lena    Number of children: 0    Years of education: College   Tobacco Use    Smoking status: Former     Packs/day: 2.00     Years: 3.00     Additional pack years: 0.00     Total pack years: 6.00     Types: Cigarettes     Quit date:      Years since quittin.8    Smokeless tobacco: Former     Types: Chew    Tobacco comments:     Caffeine - coffee and soda    Vaping Use    Vaping Use: Never used   Substance and Sexual Activity    Alcohol use: Yes     Alcohol/week: 3.0 standard drinks of alcohol     Types: 1 Glasses of wine, 1 Cans of beer, 1 Shots of liquor per week     Comment: 2    Drug use: Never     Comment: hx marijuana use \"a long time ago\"    Sexual activity: Defer     Partners: Male         FAMILY HISTORY:  Family History   Problem Relation Age of Onset    Cerebral aneurysm Mother         cerebral artery aneurysm ( age 56)    Anxiety disorder Father     Suicide Attempts Father          of suicide    Cancer Father         bladder    Prostate cancer Brother 68    No Known Problems Brother     Pancreatic cancer Nephew     Colon cancer Neg Hx     Esophageal cancer Neg Hx     Dementia Neg Hx     Malig Hyperthermia Neg Hx        REVIEW OF SYSTEMS:    Review of Systems   Constitutional:  Negative for activity change.   HENT:  Negative for nosebleeds and trouble swallowing.    Respiratory:  Negative for wheezing.    Cardiovascular:  Negative for chest pain and palpitations.   Gastrointestinal:  Negative for constipation and diarrhea.   Genitourinary:  Negative for dysuria and hematuria.   Musculoskeletal:  Negative for arthralgias and myalgias.   Neurological:  Negative for seizures and syncope.   Hematological:  Negative for adenopathy. Does not bruise/bleed easily. " "  Psychiatric/Behavioral:  Negative for confusion.            Objective    Vitals:    11/09/23 1259   BP: 116/64   Pulse: 82   Resp: 16   Temp: 98.3 °F (36.8 °C)   TempSrc: Temporal   SpO2: 96%   Weight: 77.1 kg (169 lb 14.4 oz)   Height: 195 cm (76.77\")   PainSc:   3   PainLoc: Generalized  Comment: all over         11/9/2023    12:59 PM   Current Status   ECOG score 1      PHYSICAL EXAM:            CONSTITUTIONAL:  Vital signs reviewed.  No distress, looks comfortable.  EYES:  Conjunctiva and lids unremarkable.  PERRLA  EARS,NOSE,MOUTH,THROAT:  Ears and nose appear unremarkable.  Lips, teeth, gums appear unremarkable.  RESPIRATORY:  Normal respiratory effort.  Lungs clear to auscultation bilaterally.  CARDIOVASCULAR:  Normal S1, S2.  No murmurs rubs or gallops.  Significant bilateral lower extremity edema unchanged  GASTROINTESTINAL: Abdomen appears unremarkable.  Nontender.  No hepatomegaly.  No splenomegaly.  LYMPHATIC:  No cervical, supraclavicular, axillary lymphadenopathy.  SKIN:  Warm.  No rashes.  PSYCHIATRIC:  Normal judgment and insight.  Normal mood and affect.          RECENT LABS:        WBC   Date/Time Value Ref Range Status   11/09/2023 12:49 PM 6.37 3.40 - 10.80 10*3/mm3 Final   06/23/2023 02:51 PM 4.45 3.40 - 10.80 10*3/mm3 Final   04/16/2018 04:25 PM 4.23 (L) 4.5 - 11.0 10*3/uL Final     Hemoglobin   Date/Time Value Ref Range Status   11/09/2023 12:49 PM 9.4 (L) 13.0 - 17.7 g/dL Final   04/16/2018 04:25 PM 9.9 (L) 13.5 - 17.5 g/dL Final     Platelets   Date/Time Value Ref Range Status   11/09/2023 12:49  140 - 450 10*3/mm3 Final   04/16/2018 04:25  140 - 440 10*3/uL Final       Assessment/Plan     ASSESSMENT:  There are no diagnoses linked to this encounter.    *Esophageal adenocarcinoma. Initially T2N0M0. After neoadjuvant carboplatin/Taxol with radiation, achieved a pathologic CR at resection, 4/24/2015.   As per NCCN guidelines, plan CAT scans every 6 months x1 year, then every 6 to " 9 months on years 2 and years 3. Defer any surveillance EGDs to Dr. Gonzalez if he feels they are appropriate.   Also as per NCCN guidelines, plan H and P every 3 to 6 months on years 1 and years 2, then every 6 to 12 months' time on years 3 through years 5 and then annually.   EGD 6/13/17 by Dr. Gonzalez: No evidence of recurrence.  CT scan 3/2/18 (this completed 3 years of surveillance CT): No evidence of recurrence.  Plan no more surveillance CT.  EGD 11/18/2021, Dr. Christine: No evidence of recurrent cancer.  Dilated.  No evidence of recurrence.  Remains in remission.    *Recurrent DVT, prior to cancer diagnosis.    Dr. Bolton previously managed his Coumadin.   Chronic left leg larger than right, since DVT  Acute left popliteal, gastrocnemius DVT on 3/24/2020 despite INR 3.4.  Therefore, changed to Eliquis.  On 3/25/2020, unremarkable: Lupus anticoagulant, beta-2 glycoprotein antibodies.  Anticardiolipin antibodies also felt to be unremarkable with IgG and IgM both at 15 (negative is <15.  Indeterminate is 15-20).  No evidence of recurrent DVT.  Continues on Eliquis.  No bleeding issues    *CT angiogram chest 3/24/2020 (LLE acute DVT found 3/24/2020): Mild increased opacity LLL may represent developing infiltrate versus atelectasis.  Radiologist recommended follow-up.  CT chest 5/1/2020: Resolution of groundglass LLL infiltrate from the 3/24/2020 CT.  A few mildly enlarged mediastinal nodes are less prominent than on the 3/18/2020 CT.  No new abnormalities.  Plan no more follow-up CT scans.    *Persistent cough.  He has seen Dr. Maher Sayied for this.    He did not complain of this today.    *Previously complained of emesis occurring 5 hours after eating on average once every month or 2.  No nausea otherwise.  Denies dysphagia or odynophagia.    Unchanged.  Occurring on average once every couple of months.  He did not complain of this today    *Iron deficiency anemia  Hb mostly around 11  On 4/15/2019, ferritin 29.7, 13%  saturation.  Serum folate normal.  On oral iron daily through PCP.  On 8/23/19, ferritin 65, 14%.   Increased oral iron.   On 10/15/19, ferritin 72, 10%.  On 10/25/2019, stopped oral iron since it was not helping.    Oral iron not effective due to poor absorption  2 doses Injectafer.  On 12/13/2019, ferritin 708, 15% saturation, Hb 10.4.  Therefore, no IV iron.  Monitor.  On 5/5/2020, ferritin 346, 15% saturation, Hb 9.9.  Therefore, no need for IV iron at this time.  On 7/7/2020, Hb up to 11.7.  Iron labs normal.  Admission 12/15/2020: Hb 6.6.  Ferritin 40, iron saturation 17%.  Discharged on 12/21/2020 with Hb 7.1, which fell from 7.9 on 12/18/2020, from 8.9 in the early morning 12/18/2020.  The drop in Hb was thought to be due to hemorrhoidal bleeding related to Eliquis.  Eliquis was stopped.  Hemorrhoidal repair planned by Dr. Flynn Julio after the holidays.  Was given Venofer 300 mg on 12/17/2020 by Dr. Ross of our practice  On 12/29/2020, ferritin 176, 13% saturation, Hb 8.4, reticulate hemoglobin low at 25.7.  Suspect he would benefit from some more iron.  Given 1 dose Injectafer.  On 2/2/2021, ferritin 317, 17% saturation, Hb 10.3.  Therefore, Hb gradually improved since the 1 dose of Injectafer.  3/2/2021: Ferritin iron 93, 11% saturation, Hb 11.9.  1 dose Injectafer.  3/23/2021: Ferritin 471, 20% saturation, Hb 10.8  7/6/2021: Ferritin 329, 21% saturation, Hb 10.8  9/28/2021: Ferritin 230, 20% saturation, Hb 11.4.  No need for IV iron.  12/28/2021: Ferritin 337, 6% iron saturation, Hb 10.4.  No IV iron given.  (Although saturation is low, ferritin is 337).  3/22/2022: Ferritin 112, 12% saturation, Hb 10.6.  Plan 1 dose Injectafer.  5/31/2022: Ferritin 473, 23% saturation, Hb 11.4  8/30/2022: Ferritin 308, 17% saturation, Hb 11.7.  No need for Injectafer  11/22/2022: Ferritin 268, 14% saturation.  Hb 11.9.  No need for Injectafer.  2/15/2023: Hb 11.  Ferritin 247, 19% saturation.  No need for  Injectafer.  5/16/2023: Hb 12.  Ferritin 358, 15% saturation.  No need for Injectafer.  8/15/23: Hb 10.3  11/9/2023: Hb 9.4.  Ferritin 458, 8% saturation.  Arrange 1 dose Injectafer.  If insurance will not cover Injectafer then arrange Venofer 200 mg x 2 doses    *Hemorrhoidal bleeding with Hb down to 6.6, requiring admission, 12/15/2020.  Thought to be related to Eliquis.  Eliquis was stopped.  Hemorrhoidal repair by Dr. Flynn Julio, 12/23/2020  Eliquis subsequently restarted with no more issues with bleeding.  1 episode of dark stools yesterday, 9/27/2021.  Told him if he notices more of this he should contact Dr. Sanchez.  3/22/2022: Denies any rectal bleeding.  States this has not really been an issue since the hemorrhoidal repair  8/30/2022: Denies bleeding  11/22/2022: Denies bleeding  5/16/2023: Denies bleeding  11/9/2023: Denies bleeding    *9/28/2021: Change in stool frequency.  Was having a bowel movement 3 times per week.  For the past week has been having 3 formed bowel movements per day.  I told him if this persists he should discuss with Dr. Sanchez.    *Source of iron deficiency.  Last colonoscopy 2/26/2019 by Dr. Millan.  Last EGD 6/13/2017 by Dr. Gonzalez.  Suspect he has an absorption issue due to previous esophageal surgery.    *B12 deficiency.  On 6/6/2019, B12 <150  On B12 injections through PCP.  B12 on 5/5/2020 normal at 694  B12 on 2/2/2021, 789  5/16/2023 B12 1124  11/9/2023: B12 615    *Anemia for reasons in addition to iron deficiency and B12 deficiency  Baseline Hb mostly 10.5-11.5.  On 5/5/2020, unremarkable: RBC folate, iron labs, B12, haptoglobin, TB, LDH, direct Maki.  Creatinine baseline is 0.9-1.2   Hb 9.9 on 5/5/2020  On 7/7/2020, Hb up to 11.7.  Return to prior follow-up plan.  On 2/2/2021, B12 and RBC folate unremarkable  3/23/2021: Hb 10.8 despite normal iron stores.  5/25/2021, Hb 10.7 with normal iron labs  7/6/2021, Hb 10.8 with normal iron labs  Hb 9.4, from 10.3, from 12,  from 11, from 10.2, from 11.9, from 11.7, from 11.4    *Leukocytopenia  New issue on 3/23/2021, WBC 3.38, based on recent labs, but WBC has been as low as 2.9 December 2019  WBC 6.4, from 4.6, from 3.9, from 4.3, from 4.1, from 4.6, from 4    *Neutropenia  ANC 1590 on 3/23/2021 (previously ANC has been as low as 1480 with WBC in the low normal range)  ANC 4230, from 2910, from 2150, from 2160, from 2110, from 2560, from 1690    *Thrombocytopenia  New issue on 3/23/2021, , based on recent labs, but PLT has been as low as 119 October 2019  , from 201, from 180, from 163, from 155, from 174, from 158    *Dyspnea on exertion x2 weeks on 11/22/2022 visit  Advised to discuss with his PCP and in the meantime if it worsens he should go to the ER    *Admitted early January 2023 for respiratory failure due to RSV.  Also had A-fib with RVR.    *He had a white blood cell count in the upper 3s and a hemoglobin in the upper 12s with a normal platelet count prior to beginning chemotherapy.     PLAN:  MD CBC stat ferritin, iron panel, reticulate hemoglobin, B12 level 2 months  (I offered 6-month follow-up but he prefers to maintain 3-month)  (Seeing him in 2 months instead of his usual 3 months because Hb has been a little lower recently)  Baseline Hb when not in the hospital mostly 9.5-11.5  Continue Eliquis    hemorrhoids have been repaired.  Last drop of Hb was down to 7.1 on 12/21/2020.  (Hemorrhoidal bleeding)    Prior plan was:  MD every 6-month.  No more surveillance CT scans.  He does not have a port.    I have discussed with him if Hb drops and remains around 9 or so, we may want to plan a bone marrow biopsy

## 2023-11-10 ENCOUNTER — TELEPHONE (OUTPATIENT)
Dept: ONCOLOGY | Facility: CLINIC | Age: 73
End: 2023-11-10

## 2023-11-10 ENCOUNTER — TELEPHONE (OUTPATIENT)
Dept: ONCOLOGY | Facility: CLINIC | Age: 73
End: 2023-11-10
Payer: MEDICARE

## 2023-11-10 NOTE — TELEPHONE ENCOUNTER
Caller: Jose Hurtado    Relationship: Self    Best call back number: [phone]    What is the best time to reach you: ANY    Who are you requesting to speak with (clinical staff, provider,  specific staff member): SCHEDULING        What was the call regarding: PATIENT CALLED TO SCHEDULE IRON INFUSIONS    Is it okay if the provider responds through MyChart: NO

## 2023-11-13 ENCOUNTER — INFUSION (OUTPATIENT)
Dept: ONCOLOGY | Facility: HOSPITAL | Age: 73
End: 2023-11-13
Payer: MEDICARE

## 2023-11-13 VITALS
WEIGHT: 167.2 LBS | DIASTOLIC BLOOD PRESSURE: 65 MMHG | RESPIRATION RATE: 15 BRPM | HEIGHT: 77 IN | BODY MASS INDEX: 19.74 KG/M2 | HEART RATE: 90 BPM | TEMPERATURE: 97.7 F | OXYGEN SATURATION: 97 % | SYSTOLIC BLOOD PRESSURE: 117 MMHG

## 2023-11-13 DIAGNOSIS — K90.9 MALABSORPTION OF IRON: Primary | ICD-10-CM

## 2023-11-13 DIAGNOSIS — D50.9 IRON DEFICIENCY ANEMIA, UNSPECIFIED IRON DEFICIENCY ANEMIA TYPE: ICD-10-CM

## 2023-11-13 LAB
AMPHETAMINES SERPL QL SCN: NEGATIVE NG/ML
BARBITURATES SERPL QL SCN: NEGATIVE UG/ML
BENZODIAZ SERPL QL SCN: NEGATIVE NG/ML
CANNABINOIDS SERPL QL SCN: NEGATIVE NG/ML
COCAINE+BZE SERPL QL SCN: NEGATIVE NG/ML
ETHANOL SERPL-MCNC: NEGATIVE GM/DL
METHADONE SERPL QL SCN: NEGATIVE NG/ML
OPIATES SERPL QL SCN: NEGATIVE NG/ML
OXYCODONE+OXYMORPHONE SERPLBLD QL SCN: NEGATIVE NG/ML
PCP SERPL QL SCN: NEGATIVE NG/ML
PROPOXYPH SERPL QL SCN: NEGATIVE NG/ML
SPECIMEN STATUS: NORMAL
UNABLE TO VOID: NORMAL

## 2023-11-13 PROCEDURE — A9270 NON-COVERED ITEM OR SERVICE: HCPCS | Performed by: NURSE PRACTITIONER

## 2023-11-13 PROCEDURE — 96374 THER/PROPH/DIAG INJ IV PUSH: CPT

## 2023-11-13 PROCEDURE — 25010000002 IRON SUCROSE PER 1 MG: Performed by: NURSE PRACTITIONER

## 2023-11-13 PROCEDURE — 63710000001 ACETAMINOPHEN 325 MG TABLET: Performed by: NURSE PRACTITIONER

## 2023-11-13 PROCEDURE — 25810000003 SODIUM CHLORIDE 0.9 % SOLUTION: Performed by: NURSE PRACTITIONER

## 2023-11-13 PROCEDURE — 63710000001 DIPHENHYDRAMINE PER 50 MG: Performed by: NURSE PRACTITIONER

## 2023-11-13 RX ORDER — ACETAMINOPHEN 325 MG/1
650 TABLET ORAL ONCE
Status: COMPLETED | OUTPATIENT
Start: 2023-11-13 | End: 2023-11-13

## 2023-11-13 RX ORDER — DIPHENHYDRAMINE HCL 25 MG
25 CAPSULE ORAL ONCE
Status: COMPLETED | OUTPATIENT
Start: 2023-11-13 | End: 2023-11-13

## 2023-11-13 RX ORDER — SODIUM CHLORIDE 9 MG/ML
20 INJECTION, SOLUTION INTRAVENOUS ONCE
Status: COMPLETED | OUTPATIENT
Start: 2023-11-13 | End: 2023-11-13

## 2023-11-13 RX ADMIN — ACETAMINOPHEN 650 MG: 325 TABLET ORAL at 14:34

## 2023-11-13 RX ADMIN — IRON SUCROSE 200 MG: 20 INJECTION, SOLUTION INTRAVENOUS at 15:00

## 2023-11-13 RX ADMIN — SODIUM CHLORIDE 20 ML/HR: 9 INJECTION, SOLUTION INTRAVENOUS at 14:33

## 2023-11-13 RX ADMIN — DIPHENHYDRAMINE HYDROCHLORIDE 25 MG: 25 CAPSULE ORAL at 14:34

## 2023-11-14 ENCOUNTER — HOSPITAL ENCOUNTER (INPATIENT)
Facility: HOSPITAL | Age: 73
LOS: 2 days | Discharge: HOME | DRG: 872 | End: 2023-11-17
Attending: EMERGENCY MEDICINE | Admitting: INTERNAL MEDICINE
Payer: MEDICARE

## 2023-11-14 ENCOUNTER — APPOINTMENT (OUTPATIENT)
Dept: GENERAL RADIOLOGY | Facility: HOSPITAL | Age: 73
DRG: 872 | End: 2023-11-14
Payer: MEDICARE

## 2023-11-14 DIAGNOSIS — D64.9 CHRONIC ANEMIA: ICD-10-CM

## 2023-11-14 DIAGNOSIS — R50.9 FEVER, UNSPECIFIED FEVER CAUSE: ICD-10-CM

## 2023-11-14 DIAGNOSIS — Z79.01 ANTICOAGULATED: ICD-10-CM

## 2023-11-14 DIAGNOSIS — N39.0 ACUTE UTI: Primary | ICD-10-CM

## 2023-11-14 DIAGNOSIS — N18.9 CHRONIC RENAL IMPAIRMENT, UNSPECIFIED CKD STAGE: ICD-10-CM

## 2023-11-14 DIAGNOSIS — I50.9 CONGESTIVE HEART FAILURE, UNSPECIFIED HF CHRONICITY, UNSPECIFIED HEART FAILURE TYPE: ICD-10-CM

## 2023-11-14 LAB
ALBUMIN SERPL-MCNC: 3.5 G/DL (ref 3.5–5.2)
ALBUMIN/GLOB SERPL: 1 G/DL
ALP SERPL-CCNC: 103 U/L (ref 39–117)
ALT SERPL W P-5'-P-CCNC: 15 U/L (ref 1–41)
ANION GAP SERPL CALCULATED.3IONS-SCNC: 9 MMOL/L (ref 5–15)
AST SERPL-CCNC: 19 U/L (ref 1–40)
BASOPHILS # BLD AUTO: 0.02 10*3/MM3 (ref 0–0.2)
BASOPHILS NFR BLD AUTO: 0.3 % (ref 0–1.5)
BILIRUB SERPL-MCNC: 0.4 MG/DL (ref 0–1.2)
BUN SERPL-MCNC: 29 MG/DL (ref 8–23)
BUN/CREAT SERPL: 19.9 (ref 7–25)
CALCIUM SPEC-SCNC: 9 MG/DL (ref 8.6–10.5)
CHLORIDE SERPL-SCNC: 102 MMOL/L (ref 98–107)
CO2 SERPL-SCNC: 27 MMOL/L (ref 22–29)
CREAT SERPL-MCNC: 1.46 MG/DL (ref 0.76–1.27)
D-LACTATE SERPL-SCNC: 1 MMOL/L (ref 0.5–2)
DEPRECATED RDW RBC AUTO: 42.4 FL (ref 37–54)
EGFRCR SERPLBLD CKD-EPI 2021: 49.8 ML/MIN/1.73
EOSINOPHIL # BLD AUTO: 0.02 10*3/MM3 (ref 0–0.4)
EOSINOPHIL NFR BLD AUTO: 0.3 % (ref 0.3–6.2)
ERYTHROCYTE [DISTWIDTH] IN BLOOD BY AUTOMATED COUNT: 13.8 % (ref 12.3–15.4)
GLOBULIN UR ELPH-MCNC: 3.5 GM/DL
GLUCOSE SERPL-MCNC: 104 MG/DL (ref 65–99)
HCT VFR BLD AUTO: 30.4 % (ref 37.5–51)
HGB BLD-MCNC: 9.9 G/DL (ref 13–17.7)
IMM GRANULOCYTES # BLD AUTO: 0.03 10*3/MM3 (ref 0–0.05)
IMM GRANULOCYTES NFR BLD AUTO: 0.5 % (ref 0–0.5)
LIPASE SERPL-CCNC: 28 U/L (ref 13–60)
LYMPHOCYTES # BLD AUTO: 0.32 10*3/MM3 (ref 0.7–3.1)
LYMPHOCYTES NFR BLD AUTO: 5.5 % (ref 19.6–45.3)
MCH RBC QN AUTO: 27.7 PG (ref 26.6–33)
MCHC RBC AUTO-ENTMCNC: 32.6 G/DL (ref 31.5–35.7)
MCV RBC AUTO: 85.2 FL (ref 79–97)
MONOCYTES # BLD AUTO: 0.27 10*3/MM3 (ref 0.1–0.9)
MONOCYTES NFR BLD AUTO: 4.6 % (ref 5–12)
NEUTROPHILS NFR BLD AUTO: 5.2 10*3/MM3 (ref 1.7–7)
NEUTROPHILS NFR BLD AUTO: 88.8 % (ref 42.7–76)
NRBC BLD AUTO-RTO: 0 /100 WBC (ref 0–0.2)
PLATELET # BLD AUTO: 252 10*3/MM3 (ref 140–450)
PMV BLD AUTO: 8.5 FL (ref 6–12)
POTASSIUM SERPL-SCNC: 3.6 MMOL/L (ref 3.5–5.2)
PROT SERPL-MCNC: 7 G/DL (ref 6–8.5)
RBC # BLD AUTO: 3.57 10*6/MM3 (ref 4.14–5.8)
SODIUM SERPL-SCNC: 138 MMOL/L (ref 136–145)
WBC NRBC COR # BLD: 5.86 10*3/MM3 (ref 3.4–10.8)

## 2023-11-14 PROCEDURE — 0202U NFCT DS 22 TRGT SARS-COV-2: CPT | Performed by: EMERGENCY MEDICINE

## 2023-11-14 PROCEDURE — 83540 ASSAY OF IRON: CPT | Performed by: INTERNAL MEDICINE

## 2023-11-14 PROCEDURE — 87040 BLOOD CULTURE FOR BACTERIA: CPT | Performed by: EMERGENCY MEDICINE

## 2023-11-14 PROCEDURE — 25010000002 ONDANSETRON PER 1 MG: Performed by: EMERGENCY MEDICINE

## 2023-11-14 PROCEDURE — 83605 ASSAY OF LACTIC ACID: CPT | Performed by: EMERGENCY MEDICINE

## 2023-11-14 PROCEDURE — 25810000003 LACTATED RINGERS SOLUTION: Performed by: EMERGENCY MEDICINE

## 2023-11-14 PROCEDURE — 71045 X-RAY EXAM CHEST 1 VIEW: CPT

## 2023-11-14 PROCEDURE — 84466 ASSAY OF TRANSFERRIN: CPT | Performed by: INTERNAL MEDICINE

## 2023-11-14 PROCEDURE — 99285 EMERGENCY DEPT VISIT HI MDM: CPT

## 2023-11-14 PROCEDURE — 85025 COMPLETE CBC W/AUTO DIFF WBC: CPT | Performed by: EMERGENCY MEDICINE

## 2023-11-14 PROCEDURE — 80053 COMPREHEN METABOLIC PANEL: CPT | Performed by: EMERGENCY MEDICINE

## 2023-11-14 PROCEDURE — 87186 SC STD MICRODIL/AGAR DIL: CPT | Performed by: EMERGENCY MEDICINE

## 2023-11-14 PROCEDURE — 83690 ASSAY OF LIPASE: CPT | Performed by: EMERGENCY MEDICINE

## 2023-11-14 PROCEDURE — 87150 DNA/RNA AMPLIFIED PROBE: CPT | Performed by: EMERGENCY MEDICINE

## 2023-11-14 PROCEDURE — 36415 COLL VENOUS BLD VENIPUNCTURE: CPT

## 2023-11-14 PROCEDURE — 82728 ASSAY OF FERRITIN: CPT | Performed by: INTERNAL MEDICINE

## 2023-11-14 RX ORDER — ACETAMINOPHEN 500 MG
1000 TABLET ORAL ONCE
Status: COMPLETED | OUTPATIENT
Start: 2023-11-14 | End: 2023-11-14

## 2023-11-14 RX ORDER — SODIUM CHLORIDE 0.9 % (FLUSH) 0.9 %
10 SYRINGE (ML) INJECTION AS NEEDED
Status: DISCONTINUED | OUTPATIENT
Start: 2023-11-14 | End: 2023-11-17 | Stop reason: HOSPADM

## 2023-11-14 RX ORDER — MORPHINE SULFATE 2 MG/ML
2 INJECTION, SOLUTION INTRAMUSCULAR; INTRAVENOUS ONCE
Status: COMPLETED | OUTPATIENT
Start: 2023-11-15 | End: 2023-11-15

## 2023-11-14 RX ORDER — ONDANSETRON 2 MG/ML
4 INJECTION INTRAMUSCULAR; INTRAVENOUS ONCE
Status: COMPLETED | OUTPATIENT
Start: 2023-11-14 | End: 2023-11-14

## 2023-11-14 RX ADMIN — ONDANSETRON 4 MG: 2 INJECTION INTRAMUSCULAR; INTRAVENOUS at 23:08

## 2023-11-14 RX ADMIN — SODIUM CHLORIDE, POTASSIUM CHLORIDE, SODIUM LACTATE AND CALCIUM CHLORIDE 1000 ML: 600; 310; 30; 20 INJECTION, SOLUTION INTRAVENOUS at 23:06

## 2023-11-14 RX ADMIN — ACETAMINOPHEN 1000 MG: 500 TABLET ORAL at 23:06

## 2023-11-15 ENCOUNTER — APPOINTMENT (OUTPATIENT)
Dept: CT IMAGING | Facility: HOSPITAL | Age: 73
DRG: 872 | End: 2023-11-15
Payer: MEDICARE

## 2023-11-15 PROBLEM — A41.9 SEPSIS: Status: ACTIVE | Noted: 2023-11-15

## 2023-11-15 PROBLEM — N13.30 BILATERAL HYDRONEPHROSIS: Status: ACTIVE | Noted: 2023-11-15

## 2023-11-15 PROBLEM — S91.102A OPEN WOUND OF LEFT GREAT TOE: Status: ACTIVE | Noted: 2023-11-15

## 2023-11-15 PROBLEM — N39.0 ACUTE UTI: Status: ACTIVE | Noted: 2023-11-15

## 2023-11-15 PROBLEM — N17.9 AKI (ACUTE KIDNEY INJURY): Status: ACTIVE | Noted: 2023-11-15

## 2023-11-15 PROBLEM — J98.4 SCARRING OF LUNG: Status: ACTIVE | Noted: 2023-11-15

## 2023-11-15 LAB
ANION GAP SERPL CALCULATED.3IONS-SCNC: 6.9 MMOL/L (ref 5–15)
B PARAPERT DNA SPEC QL NAA+PROBE: NOT DETECTED
B PERT DNA SPEC QL NAA+PROBE: NOT DETECTED
BACTERIA UR QL AUTO: ABNORMAL /HPF
BILIRUB UR QL STRIP: NEGATIVE
BUN SERPL-MCNC: 23 MG/DL (ref 8–23)
BUN/CREAT SERPL: 19.5 (ref 7–25)
C PNEUM DNA NPH QL NAA+NON-PROBE: NOT DETECTED
CALCIUM SPEC-SCNC: 8.3 MG/DL (ref 8.6–10.5)
CHLORIDE SERPL-SCNC: 103 MMOL/L (ref 98–107)
CLARITY UR: ABNORMAL
CO2 SERPL-SCNC: 26.1 MMOL/L (ref 22–29)
COLOR UR: YELLOW
CREAT SERPL-MCNC: 1.18 MG/DL (ref 0.76–1.27)
DEPRECATED RDW RBC AUTO: 42.3 FL (ref 37–54)
EGFRCR SERPLBLD CKD-EPI 2021: 64.3 ML/MIN/1.73
ERYTHROCYTE [DISTWIDTH] IN BLOOD BY AUTOMATED COUNT: 13.6 % (ref 12.3–15.4)
FERRITIN SERPL-MCNC: 676 NG/ML (ref 30–400)
FLUAV SUBTYP SPEC NAA+PROBE: NOT DETECTED
FLUBV RNA ISLT QL NAA+PROBE: NOT DETECTED
GLUCOSE SERPL-MCNC: 108 MG/DL (ref 65–99)
GLUCOSE UR STRIP-MCNC: NEGATIVE MG/DL
HADV DNA SPEC NAA+PROBE: NOT DETECTED
HCOV 229E RNA SPEC QL NAA+PROBE: NOT DETECTED
HCOV HKU1 RNA SPEC QL NAA+PROBE: NOT DETECTED
HCOV NL63 RNA SPEC QL NAA+PROBE: NOT DETECTED
HCOV OC43 RNA SPEC QL NAA+PROBE: NOT DETECTED
HCT VFR BLD AUTO: 26.1 % (ref 37.5–51)
HGB BLD-MCNC: 8.4 G/DL (ref 13–17.7)
HGB UR QL STRIP.AUTO: ABNORMAL
HMPV RNA NPH QL NAA+NON-PROBE: NOT DETECTED
HOLD SPECIMEN: NORMAL
HOLD SPECIMEN: NORMAL
HPIV1 RNA ISLT QL NAA+PROBE: NOT DETECTED
HPIV2 RNA SPEC QL NAA+PROBE: NOT DETECTED
HPIV3 RNA NPH QL NAA+PROBE: NOT DETECTED
HPIV4 P GENE NPH QL NAA+PROBE: NOT DETECTED
HYALINE CASTS UR QL AUTO: ABNORMAL /LPF
IRON 24H UR-MRATE: 27 MCG/DL (ref 59–158)
IRON SATN MFR SERPL: 11 % (ref 20–50)
KETONES UR QL STRIP: NEGATIVE
LEUKOCYTE ESTERASE UR QL STRIP.AUTO: ABNORMAL
M PNEUMO IGG SER IA-ACNC: NOT DETECTED
MCH RBC QN AUTO: 27.5 PG (ref 26.6–33)
MCHC RBC AUTO-ENTMCNC: 32.2 G/DL (ref 31.5–35.7)
MCV RBC AUTO: 85.6 FL (ref 79–97)
NITRITE UR QL STRIP: NEGATIVE
PH UR STRIP.AUTO: 5.5 [PH] (ref 5–8)
PLATELET # BLD AUTO: 220 10*3/MM3 (ref 140–450)
PMV BLD AUTO: 8.8 FL (ref 6–12)
POTASSIUM SERPL-SCNC: 3.8 MMOL/L (ref 3.5–5.2)
PROT UR QL STRIP: NEGATIVE
RBC # BLD AUTO: 3.05 10*6/MM3 (ref 4.14–5.8)
RBC # UR STRIP: ABNORMAL /HPF
REF LAB TEST METHOD: ABNORMAL
RETICS # AUTO: 0.02 10*6/MM3 (ref 0.02–0.13)
RETICS/RBC NFR AUTO: 0.74 % (ref 0.7–1.9)
RHINOVIRUS RNA SPEC NAA+PROBE: NOT DETECTED
RSV RNA NPH QL NAA+NON-PROBE: NOT DETECTED
SARS-COV-2 RNA NPH QL NAA+NON-PROBE: NOT DETECTED
SODIUM SERPL-SCNC: 136 MMOL/L (ref 136–145)
SP GR UR STRIP: 1.01 (ref 1–1.03)
SQUAMOUS #/AREA URNS HPF: ABNORMAL /HPF
TIBC SERPL-MCNC: 238 MCG/DL (ref 298–536)
TRANSFERRIN SERPL-MCNC: 160 MG/DL (ref 200–360)
UROBILINOGEN UR QL STRIP: ABNORMAL
WBC # UR STRIP: ABNORMAL /HPF
WBC NRBC COR # BLD: 7.5 10*3/MM3 (ref 3.4–10.8)
WHOLE BLOOD HOLD COAG: NORMAL
WHOLE BLOOD HOLD SPECIMEN: NORMAL

## 2023-11-15 PROCEDURE — 85027 COMPLETE CBC AUTOMATED: CPT | Performed by: NURSE PRACTITIONER

## 2023-11-15 PROCEDURE — 74176 CT ABD & PELVIS W/O CONTRAST: CPT

## 2023-11-15 PROCEDURE — 25810000003 SODIUM CHLORIDE 0.9 % SOLUTION: Performed by: NURSE PRACTITIONER

## 2023-11-15 PROCEDURE — 87186 SC STD MICRODIL/AGAR DIL: CPT | Performed by: INTERNAL MEDICINE

## 2023-11-15 PROCEDURE — 25010000002 MORPHINE PER 10 MG: Performed by: EMERGENCY MEDICINE

## 2023-11-15 PROCEDURE — 80048 BASIC METABOLIC PNL TOTAL CA: CPT | Performed by: NURSE PRACTITIONER

## 2023-11-15 PROCEDURE — 25810000003 SODIUM CHLORIDE 0.9 % SOLUTION: Performed by: INTERNAL MEDICINE

## 2023-11-15 PROCEDURE — 36415 COLL VENOUS BLD VENIPUNCTURE: CPT | Performed by: NURSE PRACTITIONER

## 2023-11-15 PROCEDURE — 81001 URINALYSIS AUTO W/SCOPE: CPT | Performed by: EMERGENCY MEDICINE

## 2023-11-15 PROCEDURE — 87088 URINE BACTERIA CULTURE: CPT | Performed by: INTERNAL MEDICINE

## 2023-11-15 PROCEDURE — 85045 AUTOMATED RETICULOCYTE COUNT: CPT | Performed by: INTERNAL MEDICINE

## 2023-11-15 PROCEDURE — 25010000002 CEFTRIAXONE PER 250 MG: Performed by: EMERGENCY MEDICINE

## 2023-11-15 PROCEDURE — 87086 URINE CULTURE/COLONY COUNT: CPT | Performed by: INTERNAL MEDICINE

## 2023-11-15 RX ORDER — SODIUM CHLORIDE 9 MG/ML
40 INJECTION, SOLUTION INTRAVENOUS AS NEEDED
Status: DISCONTINUED | OUTPATIENT
Start: 2023-11-15 | End: 2023-11-17 | Stop reason: HOSPADM

## 2023-11-15 RX ORDER — GABAPENTIN 100 MG/1
100 CAPSULE ORAL ONCE
Status: COMPLETED | OUTPATIENT
Start: 2023-11-15 | End: 2023-11-15

## 2023-11-15 RX ORDER — PAROXETINE 20 MG/1
40 TABLET, FILM COATED ORAL EVERY MORNING
Status: DISCONTINUED | OUTPATIENT
Start: 2023-11-15 | End: 2023-11-17 | Stop reason: HOSPADM

## 2023-11-15 RX ORDER — SODIUM CHLORIDE 0.9 % (FLUSH) 0.9 %
10 SYRINGE (ML) INJECTION EVERY 12 HOURS SCHEDULED
Status: DISCONTINUED | OUTPATIENT
Start: 2023-11-15 | End: 2023-11-17 | Stop reason: HOSPADM

## 2023-11-15 RX ORDER — SODIUM CHLORIDE 9 MG/ML
75 INJECTION, SOLUTION INTRAVENOUS CONTINUOUS
Status: ACTIVE | OUTPATIENT
Start: 2023-11-15 | End: 2023-11-15

## 2023-11-15 RX ORDER — SODIUM CHLORIDE 0.9 % (FLUSH) 0.9 %
10 SYRINGE (ML) INJECTION AS NEEDED
Status: DISCONTINUED | OUTPATIENT
Start: 2023-11-15 | End: 2023-11-17 | Stop reason: HOSPADM

## 2023-11-15 RX ORDER — METOCLOPRAMIDE 5 MG/1
5 TABLET ORAL
Status: DISCONTINUED | OUTPATIENT
Start: 2023-11-15 | End: 2023-11-17 | Stop reason: HOSPADM

## 2023-11-15 RX ORDER — BISACODYL 5 MG/1
5 TABLET, DELAYED RELEASE ORAL DAILY PRN
Status: DISCONTINUED | OUTPATIENT
Start: 2023-11-15 | End: 2023-11-17 | Stop reason: HOSPADM

## 2023-11-15 RX ORDER — ONDANSETRON 2 MG/ML
4 INJECTION INTRAMUSCULAR; INTRAVENOUS EVERY 6 HOURS PRN
Status: DISCONTINUED | OUTPATIENT
Start: 2023-11-15 | End: 2023-11-17 | Stop reason: HOSPADM

## 2023-11-15 RX ORDER — ACETAMINOPHEN 325 MG/1
650 TABLET ORAL EVERY 4 HOURS PRN
Status: DISCONTINUED | OUTPATIENT
Start: 2023-11-15 | End: 2023-11-17 | Stop reason: HOSPADM

## 2023-11-15 RX ORDER — PRAMIPEXOLE DIHYDROCHLORIDE 1.5 MG/1
1.5 TABLET ORAL 2 TIMES DAILY
Status: DISCONTINUED | OUTPATIENT
Start: 2023-11-15 | End: 2023-11-17 | Stop reason: HOSPADM

## 2023-11-15 RX ORDER — POLYETHYLENE GLYCOL 3350 17 G/17G
17 POWDER, FOR SOLUTION ORAL DAILY PRN
Status: DISCONTINUED | OUTPATIENT
Start: 2023-11-15 | End: 2023-11-17 | Stop reason: HOSPADM

## 2023-11-15 RX ORDER — ALBUTEROL SULFATE 0.83 MG/ML
2.5 SOLUTION RESPIRATORY (INHALATION) EVERY 6 HOURS PRN
Status: DISCONTINUED | OUTPATIENT
Start: 2023-11-15 | End: 2023-11-17 | Stop reason: HOSPADM

## 2023-11-15 RX ORDER — ATORVASTATIN CALCIUM 20 MG/1
40 TABLET, FILM COATED ORAL NIGHTLY
Status: DISCONTINUED | OUTPATIENT
Start: 2023-11-15 | End: 2023-11-17 | Stop reason: HOSPADM

## 2023-11-15 RX ORDER — PANTOPRAZOLE SODIUM 40 MG/1
40 TABLET, DELAYED RELEASE ORAL
Status: DISCONTINUED | OUTPATIENT
Start: 2023-11-16 | End: 2023-11-17 | Stop reason: HOSPADM

## 2023-11-15 RX ORDER — AMOXICILLIN 250 MG
2 CAPSULE ORAL 2 TIMES DAILY
Status: DISCONTINUED | OUTPATIENT
Start: 2023-11-15 | End: 2023-11-17 | Stop reason: HOSPADM

## 2023-11-15 RX ORDER — GABAPENTIN 100 MG/1
100 CAPSULE ORAL 3 TIMES DAILY
Status: DISCONTINUED | OUTPATIENT
Start: 2023-11-15 | End: 2023-11-17 | Stop reason: HOSPADM

## 2023-11-15 RX ORDER — POLYETHYLENE GLYCOL 3350 17 G/17G
17 POWDER, FOR SOLUTION ORAL DAILY
Status: DISCONTINUED | OUTPATIENT
Start: 2023-11-15 | End: 2023-11-17 | Stop reason: HOSPADM

## 2023-11-15 RX ORDER — ACETAMINOPHEN 650 MG/1
650 SUPPOSITORY RECTAL EVERY 4 HOURS PRN
Status: DISCONTINUED | OUTPATIENT
Start: 2023-11-15 | End: 2023-11-17 | Stop reason: HOSPADM

## 2023-11-15 RX ORDER — ONDANSETRON 4 MG/1
4 TABLET, FILM COATED ORAL EVERY 6 HOURS PRN
Status: DISCONTINUED | OUTPATIENT
Start: 2023-11-15 | End: 2023-11-17 | Stop reason: HOSPADM

## 2023-11-15 RX ORDER — CALCIUM CARBONATE 500 MG/1
2 TABLET, CHEWABLE ORAL 2 TIMES DAILY PRN
Status: DISCONTINUED | OUTPATIENT
Start: 2023-11-15 | End: 2023-11-17 | Stop reason: HOSPADM

## 2023-11-15 RX ORDER — FUROSEMIDE 40 MG
40 TABLET ORAL DAILY
Status: DISCONTINUED | OUTPATIENT
Start: 2023-11-15 | End: 2023-11-17

## 2023-11-15 RX ORDER — BISACODYL 10 MG
10 SUPPOSITORY, RECTAL RECTAL DAILY PRN
Status: DISCONTINUED | OUTPATIENT
Start: 2023-11-15 | End: 2023-11-17 | Stop reason: HOSPADM

## 2023-11-15 RX ORDER — TAMSULOSIN HYDROCHLORIDE 0.4 MG/1
0.4 CAPSULE ORAL DAILY
Status: DISCONTINUED | OUTPATIENT
Start: 2023-11-15 | End: 2023-11-17 | Stop reason: HOSPADM

## 2023-11-15 RX ORDER — POTASSIUM CHLORIDE 750 MG/1
10 TABLET, FILM COATED, EXTENDED RELEASE ORAL DAILY
Status: DISCONTINUED | OUTPATIENT
Start: 2023-11-15 | End: 2023-11-17 | Stop reason: HOSPADM

## 2023-11-15 RX ORDER — ACETAMINOPHEN 160 MG/5ML
650 SOLUTION ORAL EVERY 4 HOURS PRN
Status: DISCONTINUED | OUTPATIENT
Start: 2023-11-15 | End: 2023-11-17 | Stop reason: HOSPADM

## 2023-11-15 RX ADMIN — SODIUM CHLORIDE 75 ML/HR: 9 INJECTION, SOLUTION INTRAVENOUS at 20:49

## 2023-11-15 RX ADMIN — METOCLOPRAMIDE 5 MG: 5 TABLET ORAL at 14:37

## 2023-11-15 RX ADMIN — MORPHINE SULFATE 2 MG: 2 INJECTION, SOLUTION INTRAMUSCULAR; INTRAVENOUS at 00:19

## 2023-11-15 RX ADMIN — METOCLOPRAMIDE 5 MG: 5 TABLET ORAL at 17:21

## 2023-11-15 RX ADMIN — APIXABAN 5 MG: 5 TABLET, FILM COATED ORAL at 20:50

## 2023-11-15 RX ADMIN — GABAPENTIN 100 MG: 100 CAPSULE ORAL at 02:12

## 2023-11-15 RX ADMIN — POTASSIUM CHLORIDE 10 MEQ: 750 TABLET, EXTENDED RELEASE ORAL at 13:49

## 2023-11-15 RX ADMIN — PRAMIPEXOLE DIHYDROCHLORIDE 1.5 MG: 1.5 TABLET ORAL at 14:37

## 2023-11-15 RX ADMIN — Medication 10 ML: at 03:28

## 2023-11-15 RX ADMIN — SENNOSIDES AND DOCUSATE SODIUM 2 TABLET: 50; 8.6 TABLET ORAL at 20:50

## 2023-11-15 RX ADMIN — SODIUM CHLORIDE 75 ML/HR: 9 INJECTION, SOLUTION INTRAVENOUS at 03:28

## 2023-11-15 RX ADMIN — GABAPENTIN 100 MG: 100 CAPSULE ORAL at 20:50

## 2023-11-15 RX ADMIN — CEFTRIAXONE 2 G: 2 INJECTION, POWDER, FOR SOLUTION INTRAMUSCULAR; INTRAVENOUS at 01:52

## 2023-11-15 RX ADMIN — GABAPENTIN 100 MG: 100 CAPSULE ORAL at 14:36

## 2023-11-15 RX ADMIN — Medication 10 ML: at 20:50

## 2023-11-15 RX ADMIN — PAROXETINE HYDROCHLORIDE HEMIHYDRATE 40 MG: 20 TABLET, FILM COATED ORAL at 14:36

## 2023-11-15 RX ADMIN — ATORVASTATIN CALCIUM 40 MG: 20 TABLET, FILM COATED ORAL at 20:50

## 2023-11-15 RX ADMIN — TAMSULOSIN HYDROCHLORIDE 0.4 MG: 0.4 CAPSULE ORAL at 13:49

## 2023-11-15 RX ADMIN — APIXABAN 5 MG: 5 TABLET, FILM COATED ORAL at 13:49

## 2023-11-15 RX ADMIN — SENNOSIDES AND DOCUSATE SODIUM 2 TABLET: 50; 8.6 TABLET ORAL at 13:48

## 2023-11-15 RX ADMIN — PRAMIPEXOLE DIHYDROCHLORIDE 1.5 MG: 1.5 TABLET ORAL at 20:50

## 2023-11-15 NOTE — NURSING NOTE
11/15/23 0956   Wound 11/15/23 Left upper great toe   Placement Date: 11/15/23   Present on Original Admission: Yes  Side: Left  Orientation: upper  Location: great toe   Dressing Appearance dry;intact   Base clean;pink   Periwound intact   Edges irregular   Wound Length (cm) 0.3 cm   Wound Width (cm) 0.3 cm   Wound Surface Area (cm^2) 0.09 cm^2   Drainage Characteristics/Odor serosanguineous   Drainage Amount scant   Care, Wound   (betadine gauze dressing)     WOCN consult: Patient goes to [facility]. He has a very small wound top of left great toe. It is almost completely healed. Continue betadine dressing. He wears compression hose.  Change dressing daily.

## 2023-11-15 NOTE — CONSULTS
FIRST UROLOGY CONSULT      Patient Identification:  NAME:  Jose Hurtado  Age:  73 y.o.  Sex:  male   :  1950   MRN:  [id number]     Chief complaint: Wretching, fever    History of present illness:      This is a 73 year old man with a history of prostate cancer s/p IMRT, recurrent UTI, and chronic urinary retention s/p Interstim who has presented with urinary retention previously, most recently in September, and required catheterization. He was taught CIC but has not used it at home. He presented to the ER with wretching and was found to have a fever to 102 in the ER.     CT was indepedently reviewed and demonstrates a distended bladder, bilateral hydronephrosis, and bilateral perinephric fat stranding.     In hospital:  -AVSS, good UOP  -WBC - 5.86  -Creat - 1.46 (baseline 1.0)  -UA - mod blood, mod LE, neg nitrites. 21-50 WBC, no RBC, 4+ bacteria    Past medical history:  Past Medical History:   Diagnosis Date    Acute deep vein thrombosis (DVT) of popliteal vein of left lower extremity 2020    Anemia     Anti-phospholipid syndrome     On lifelong anticoagulation therapy    Bladder disorder     LEAKAGE   ON MED  wers pads    Bleeding hemorrhoids     Chronic kidney disease     Community acquired pneumonia     HISTORY OF IN     Congestive heart failure     COPD (chronic obstructive pulmonary disease)     Deep venous thrombosis ,     Left lower extremity multiple    Depression     Esophageal carcinoma 2014    had chemo and radiation prior surgery    Gastroparesis     Hemorrhoids     HH (hiatus hernia)     History of atrial fibrillation     ONE EPISODE WHILE HOSPITALIZED    History of kidney stones     History of nephrolithiasis     History of pancreatitis     PT STATES MANY YEARS AGO    History of radiation therapy     History of transfusion     Hypertension     Long-term (current) use of anticoagulants, INR goal 2.0-3.0     Lymphedema     Malignant neoplasm of prostate      Other hyperlipidemia 01/30/2018 January 30, 2018 lipid panel risk 12.8%    Restless legs syndrome     Sleep apnea     OCCASIONALLY WEARS CPAP    Squamous carcinoma     on the head       Past surgical history:  Past Surgical History:   Procedure Laterality Date    APPENDECTOMY  1950    BRONCHOSCOPY      (Diagnostic)    CARDIAC CATHETERIZATION N/A 05/14/2022    Procedure: Left Heart Cath;  Surgeon: Eric So MD;  Location: [facility] CATH INVASIVE LOCATION;  Service: Cardiology;  Laterality: N/A;    CARDIAC CATHETERIZATION N/A 05/14/2022    Procedure: Coronary angiography;  Surgeon: Eric So MD;  Location: [facility] CATH INVASIVE LOCATION;  Service: Cardiology;  Laterality: N/A;    CARDIAC CATHETERIZATION N/A 05/14/2022    Procedure: Left ventriculography;  Surgeon: Eric oS MD;  Location: [facility] CATH INVASIVE LOCATION;  Service: Cardiology;  Laterality: N/A;    CATARACT EXTRACTION Bilateral 2014    COLONOSCOPY  12/15/2014    Complete / Description: EH, IH, torts, stool, follow-up colonoscopy due in 5 years.    COLONOSCOPY N/A 06/13/2017    non-thrombosed external hemorrhoids, normal examined ileum, IH    COLONOSCOPY N/A 02/26/2019    Procedure: COLONOSCOPY with Cold Polypectomy;  Surgeon: Mulugeta Millan MD;  Location: [facility] ENDOSCOPY;  Service: Gastroenterology    COLONOSCOPY N/A 12/16/2020    Procedure: COLONOSCOPY to cecum into TI;  Surgeon: Mesha Sanchez MD;  Location: [facility] ENDOSCOPY;  Service: Gastroenterology;  Laterality: N/A;  pre: lower GI bleed  post: hemorrhoids     CYSTOSCOPY W/ LASER LITHOTRIPSY      ENDOSCOPY N/A 06/13/2017    Procedure: ESOPHAGOGASTRODUODENOSCOPY WITH COLD BIOPSY;  Surgeon: Lake Gonzalez MD;  Location: [facility] ENDOSCOPY;  Service:     ENDOSCOPY N/A 11/18/2021    Procedure: ESOPHAGOGASTRODUODENOSCOPY WITH  DILATATION WITH FLUOROSCOPY;  Surgeon: Jose Christine III, MD;  Location: [facility] ENDOSCOPY;  Service: Gastroenterology;   Laterality: N/A;  Pre: dysphagia, h/x of adinocarcinoma of esophagus  Post: same    ENDOSCOPY N/A 08/07/2023    Procedure: ESOPHAGOGASTRODUODENOSCOPY;  Surgeon: Jameel Valencia MD;  Location: [facility] ENDOSCOPY;  Service: Gastroenterology;  Laterality: N/A;  PRE- DYSPHAGIA  POST-ABORTED DUE TO RETAINED FOOD    ENDOSCOPY N/A 08/08/2023    Procedure: ESOPHAGOGASTRODUODENOSCOPY with bx;  Surgeon: Jameel Valencia MD;  Location: [facility] ENDOSCOPY;  Service: Gastroenterology;  Laterality: N/A;  pre: N/V, abd pain  post: esophageal nodule, retained food    ESOPHAGECTOMY      April 2015, stage IIB esophageal carcinoma, sub-total resection.    ESOPHAGECTOMY      Esophagectomy Subtotal Hope Joe Procedure    EXCISION LESION  08/2012    Removal of Squamous Cell CA on Head    HAMMER TOE REPAIR  09/2014    Hammertoe Operation (Each Toe), 10/2014    HAMMER TOE REPAIR Left 10/03/2017    Procedure: Left second third and fourth distal interphalangeal joint resection with flexor tenotomy;  Surgeon: Mulugeta Lira MD;  Location: [facility] OR OSC;  Service:     HEMORRHOIDECTOMY N/A 12/23/2020    Procedure: HEMORRHOIDECTOMY;  Surgeon: Billy Julio Jr., MD;  Location: [facility] MAIN OR;  Service: General;  Laterality: N/A;    HERNIA REPAIR      incisional    JEJUNOSTOMY      Laparoscopic    JEJUNOSTOMY      tube removal     KNEE SURGERY Bilateral 1967, 1973, 1981    PATELLA SURGERY Left     removed    PILONIDAL CYST / SINUS EXCISION      NC ARTHRP KNE CONDYLE&PLATU MEDIAL&LAT COMPARTMENTS Right 03/26/2018    Procedure: TOTAL KNEE ARTHROPLASTY;  Surgeon: Renny Solis MD;  Location: [facility] MAIN OR;  Service: Orthopedics    PROSTATECTOMY  2010    PYLOROPLASTY      SIGMOIDOSCOPY N/A 08/02/2023    Procedure: SIGMOIDOSCOPY FLEXIBLE;  Surgeon: Mesha Sanchez MD;  Location: [facility] ENDOSCOPY;  Service: Gastroenterology;  Laterality: N/A;  PRE- ABNORMAL CT  POST- HEMORRHOIDS, ULCERATION    SPINAL FUSION  02/1998    C 5,6     TONSILLECTOMY      UPPER GASTROINTESTINAL ENDOSCOPY  12/15/2014    LA Grade D esophagitis, Pardo's, HH, multiple duodenal ulcers    VENTRAL/INCISIONAL HERNIA REPAIR N/A 04/14/2016    Procedure: VENTRAL/INCISIONAL HERNIA REPAIR, open, with mesh, and component separation;  Surgeon: Darren Rivas MD;  Location: [facility] MAIN OR;  Service:        Allergies:  Sulfamethoxazole-trimethoprim    Home medications:  Medications Prior to Admission   Medication Sig Dispense Refill Last Dose    albuterol sulfate  (90 Base) MCG/ACT inhaler Inhale 1 puff Every 4 (Four) Hours As Needed for Wheezing.       atorvastatin (LIPITOR) 40 MG tablet Take 1 tablet by mouth Every Night. 90 tablet 3     Eliquis 5 MG tablet tablet TAKE ONE TABLET BY MOUTH EVERY 12 HOURS (Patient taking differently: Take 1 tablet by mouth Every 12 (Twelve) Hours. Indications: Atrial Fibrillation) 60 tablet 1 11/14/2023    furosemide (LASIX) 40 MG tablet Take 1 tablet by mouth Daily. Indications: Cardiac Failure, Edema   11/14/2023    gabapentin (NEURONTIN) 100 MG capsule Take 1 capsule by mouth 3 (Three) Times a Day. 90 capsule 0 11/14/2023    metoclopramide (Reglan) 5 MG tablet Take 1 tablet by mouth 3 (Three) Times a Day Before Meals. 90 tablet 1 11/14/2023    ondansetron ODT (ZOFRAN-ODT) 4 MG disintegrating tablet Place 1 tablet on the tongue Every 8 (Eight) Hours As Needed for Nausea or Vomiting. 12 tablet 0     pantoprazole (PROTONIX) 40 MG EC tablet TAKE ONE TABLET BY MOUTH TWICE A DAY BEFORE A MEAL 180 tablet 3     PARoxetine (PAXIL) 40 MG tablet TAKE ONE TABLET BY MOUTH EVERY MORNING 30 tablet 5     polyethylene glycol (MIRALAX) 17 g packet Take 17 g by mouth Daily. 30 each 0     potassium chloride (MICRO-K) 10 MEQ CR capsule Take 1 capsule by mouth Daily. 90 capsule 5     pramipexole (MIRAPEX) 1.5 MG tablet TAKE ONE TABLET BY MOUTH TWICE A DAY (Patient taking differently: Take 1 tablet by mouth Every Night. PT TAKES TWO TABLETS NIGHTLY    Indications: Restless Leg Syndrome) 180 tablet 1     tamsulosin (FLOMAX) 0.4 MG capsule 24 hr capsule Take 1 capsule by mouth Daily. 30 capsule 0 2023    tiotropium bromide monohydrate (Spiriva Respimat) 2.5 MCG/ACT aerosol solution inhaler Inhale 2 puffs Daily. 4 g 2     cyanocobalamin 1000 MCG/ML injection INJECT 1 ML INTRAMUSCULARLY ONCE EVERY 30 DAYS AS DIRECTED 1 mL 11     metoprolol tartrate (LOPRESSOR) 50 MG tablet Take 1 tablet by mouth 2 (Two) Times a Day for 60 days. (Patient not taking: Reported on 2023) 60 tablet 1     tiotropium bromide-olodaterol (Stiolto Respimat) 2.5-2.5 MCG/ACT aerosol solution inhaler Inhale 2.5 puffs Daily. Indications: Chronic Bronchitis, Chronic Obstructive Lung Disease           Hospital medications:  [START ON 2023] cefTRIAXone, 1,000 mg, Intravenous, Q24H  senna-docusate sodium, 2 tablet, Oral, BID  sodium chloride, 10 mL, Intravenous, Q12H      sodium chloride, 75 mL/hr, Last Rate: 75 mL/hr (11/15/23 0536)        acetaminophen **OR** acetaminophen **OR** acetaminophen    senna-docusate sodium **AND** polyethylene glycol **AND** bisacodyl **AND** bisacodyl    calcium carbonate    ondansetron **OR** ondansetron    sodium chloride    sodium chloride    [COMPLETED] Insert Peripheral IV **AND** sodium chloride    sodium chloride    sodium chloride    Family history:  Family History   Problem Relation Age of Onset    Cerebral aneurysm Mother         cerebral artery aneurysm ( age 56)    Anxiety disorder Father     Suicide Attempts Father          of suicide    Cancer Father         bladder    Prostate cancer Brother 68    No Known Problems Brother     Pancreatic cancer Nephew     Colon cancer Neg Hx     Esophageal cancer Neg Hx     Dementia Neg Hx     Malig Hyperthermia Neg Hx        Social history:  Social History     Tobacco Use    Smoking status: Former     Packs/day: 2.00     Years: 3.00     Additional pack years: 0.00     Total pack years: 6.00      "Types: Cigarettes     Quit date: 1974     Years since quittin.9    Smokeless tobacco: Former     Types: Chew    Tobacco comments:     Caffeine - coffee and soda    Vaping Use    Vaping Use: Never used   Substance Use Topics    Alcohol use: Yes     Alcohol/week: 3.0 standard drinks of alcohol     Types: 1 Glasses of wine, 1 Cans of beer, 1 Shots of liquor per week     Comment: 2    Drug use: Never     Comment: hx marijuana use \"a long time ago\"       Review of systems:      Positive for:  nothing  Negative for:  chest pain, cough, sob, o/w neg    Objective:  TMax 24 hours:   Temp (24hrs), Av.8 °F (37.7 °C), Min:98.6 °F (37 °C), Max:102 °F (38.9 °C)      Vitals Ranges:   Temp:  [98.6 °F (37 °C)-102 °F (38.9 °C)] 98.6 °F (37 °C)  Heart Rate:  [] 71  Resp:  [16-20] 20  BP: ()/(52-56) 104/52    Intake/Output Last 3 shifts:  I/O last 3 completed shifts:  In: 1100 [IV Piggyback:1100]  Out: 400 [Urine:400]     Physical Exam:    General Appearance:    Alert, cooperative, NAD                   Neuro/Psych:   Orientation intact, mood/affect pleasant       Results review:   I reviewed the patient's new clinical results.    Data review:  Lab Results (last 24 hours)       Procedure Component Value Units Date/Time    Urinalysis, Microscopic Only - Urine, Clean Catch [[id number]]  (Abnormal) Collected: 11/15/23 0036    Specimen: Urine, Clean Catch Updated: 11/15/23 0122     RBC, UA None Seen /HPF      WBC, UA 21-50 /HPF      Bacteria, UA 4+ /HPF      Squamous Epithelial Cells, UA 0-2 /HPF      Hyaline Casts, UA None Seen /LPF      Methodology Manual Light Microscopy    Urinalysis With Microscopic If Indicated (No Culture) - Urine, Clean Catch [[id number]]  (Abnormal) Collected: 11/15/23 0036    Specimen: Urine, Clean Catch Updated: 11/15/23 0053     Color, UA Yellow     Appearance, UA Cloudy     pH, UA 5.5     Specific Gravity, UA 1.011     Glucose, UA Negative     Ketones, UA Negative     Bilirubin, UA Negative "     Blood, UA Moderate (2+)     Protein, UA Negative     Leuk Esterase, UA Moderate (2+)     Nitrite, UA Negative     Urobilinogen, UA 0.2 E.U./dL    Respiratory Panel PCR w/COVID-19(SARS-CoV-2) TONIE/HOWARD/CARMEN/PAD/COR/ALEXANDER In-House, NP Swab in UTM/VTM, 2 HR TAT - Swab, Nasopharynx [[id number]]  (Normal) Collected: 11/14/23 2309    Specimen: Swab from Nasopharynx Updated: 11/15/23 0001     ADENOVIRUS, PCR Not Detected     Coronavirus 229E Not Detected     Coronavirus HKU1 Not Detected     Coronavirus NL63 Not Detected     Coronavirus OC43 Not Detected     COVID19 Not Detected     Human Metapneumovirus Not Detected     Human Rhinovirus/Enterovirus Not Detected     Influenza A PCR Not Detected     Influenza B PCR Not Detected     Parainfluenza Virus 1 Not Detected     Parainfluenza Virus 2 Not Detected     Parainfluenza Virus 3 Not Detected     Parainfluenza Virus 4 Not Detected     RSV, PCR Not Detected     Bordetella pertussis pcr Not Detected     Bordetella parapertussis PCR Not Detected     Chlamydophila pneumoniae PCR Not Detected     Mycoplasma pneumo by PCR Not Detected    Narrative:      In the setting of a positive respiratory panel with a viral infection PLUS a negative procalcitonin without other underlying concern for bacterial infection, consider observing off antibiotics or discontinuation of antibiotics and continue supportive care. If the respiratory panel is positive for atypical bacterial infection (Bordetella pertussis, Chlamydophila pneumoniae, or Mycoplasma pneumoniae), consider antibiotic de-escalation to target atypical bacterial infection.    [place] Draw [[id number]] Collected: 11/14/23 2249    Specimen: Blood Updated: 11/15/23 0000    Narrative:      The following orders were created for panel order [place] Draw.  Procedure                               Abnormality         Status                     ---------                               -----------         ------                     Green Top  (Gel)[[id number]]                                  Final result               Lavender Top[[id number]]                                     Final result               Gold Top - SST[[id number]]                                   Final result               Light Blue Top[[id number]]                                   Final result                 Please view results for these tests on the individual orders.    Green Top (Gel) [[id number]] Collected: 11/14/23 2249    Specimen: Blood Updated: 11/15/23 0000     Extra Tube Hold for add-ons.     Comment: Auto resulted.       Lavender Top [[id number]] Collected: 11/14/23 2249    Specimen: Blood Updated: 11/15/23 0000     Extra Tube hold for add-on     Comment: Auto resulted       Gold Top - SST [[id number]] Collected: 11/14/23 2249    Specimen: Blood Updated: 11/15/23 0000     Extra Tube Hold for add-ons.     Comment: Auto resulted.       Light Blue Top [[id number]] Collected: 11/14/23 2249    Specimen: Blood Updated: 11/15/23 0000     Extra Tube Hold for add-ons.     Comment: Auto resulted       Comprehensive Metabolic Panel [[id number]]  (Abnormal) Collected: 11/14/23 2249    Specimen: Blood Updated: 11/14/23 2320     Glucose 104 mg/dL      BUN 29 mg/dL      Creatinine 1.46 mg/dL      Sodium 138 mmol/L      Potassium 3.6 mmol/L      Chloride 102 mmol/L      CO2 27.0 mmol/L      Calcium 9.0 mg/dL      Total Protein 7.0 g/dL      Albumin 3.5 g/dL      ALT (SGPT) 15 U/L      AST (SGOT) 19 U/L      Alkaline Phosphatase 103 U/L      Total Bilirubin 0.4 mg/dL      Globulin 3.5 gm/dL      A/G Ratio 1.0 g/dL      BUN/Creatinine Ratio 19.9     Anion Gap 9.0 mmol/L      eGFR 49.8 mL/min/1.73     Narrative:      GFR Normal >60  Chronic Kidney Disease <60  Kidney Failure <15    The GFR formula is only valid for adults with stable renal function between ages 18 and 70.    Lipase [[id number]]  (Normal) Collected: 11/14/23 2249    Specimen: Blood Updated: 11/14/23 2320     Lipase 28 U/L     Lactic  Acid, Plasma [[id number]]  (Normal) Collected: 11/14/23 2249    Specimen: Blood Updated: 11/14/23 2320     Lactate 1.0 mmol/L     CBC & Differential [[id number]]  (Abnormal) Collected: 11/14/23 2249    Specimen: Blood Updated: 11/14/23 2306    Narrative:      The following orders were created for panel order CBC & Differential.  Procedure                               Abnormality         Status                     ---------                               -----------         ------                     CBC Auto Differential[[id number]]        Abnormal            Final result                 Please view results for these tests on the individual orders.    CBC Auto Differential [[id number]]  (Abnormal) Collected: 11/14/23 2249    Specimen: Blood Updated: 11/14/23 2306     WBC 5.86 10*3/mm3      RBC 3.57 10*6/mm3      Hemoglobin 9.9 g/dL      Hematocrit 30.4 %      MCV 85.2 fL      MCH 27.7 pg      MCHC 32.6 g/dL      RDW 13.8 %      RDW-SD 42.4 fl      MPV 8.5 fL      Platelets 252 10*3/mm3      Neutrophil % 88.8 %      Lymphocyte % 5.5 %      Monocyte % 4.6 %      Eosinophil % 0.3 %      Basophil % 0.3 %      Immature Grans % 0.5 %      Neutrophils, Absolute 5.20 10*3/mm3      Lymphocytes, Absolute 0.32 10*3/mm3      Monocytes, Absolute 0.27 10*3/mm3      Eosinophils, Absolute 0.02 10*3/mm3      Basophils, Absolute 0.02 10*3/mm3      Immature Grans, Absolute 0.03 10*3/mm3      nRBC 0.0 /100 WBC     Blood Culture - Blood, Arm, Left [[id number]] Collected: 11/14/23 2249    Specimen: Blood from Arm, Left Updated: 11/14/23 2255    Blood Culture - Blood, Arm, Right [[id number]] Collected: 11/14/23 2249    Specimen: Blood from Arm, Right Updated: 11/14/23 2255             Imaging:  Imaging Results (Last 24 Hours)       Procedure Component Value Units Date/Time    CT Abdomen Pelvis Without Contrast [[id number]] Collected: 11/15/23 0023     Updated: 11/15/23 0035    Narrative:      CT OF THE ABDOMEN AND PELVIS WITHOUT CONTRAST      HISTORY: Weakness     COMPARISON: July 29, 2023     TECHNIQUE: Axial CT imaging was obtained through the abdomen and pelvis.  No IV contrast was administered.     FINDINGS:  The patient is again noted to be status post esophagectomy and gastric  pull-up. The configuration appears similar to the prior study. There is  scarring identified at the right lung base. There is a chronic loculated  right pleural effusion, as well as pleural thickening. Left lung base is  clear. No suspicious hepatic lesions are seen. Spleen, pancreas,  gallbladder, right adrenal gland, and duodenum appear normal. There is a  stable bulky appearance to the left adrenal gland. Punctate  nonobstructing stones are noted within both kidneys. Largest stone on  the right measures 4 mm. Stone on the left measures 4 mm as well. There  is mild to moderate bilateral hydroureteronephrosis. The patient had  similar findings on prior CT. No distal ureteral or bladder stones are  identified. The urinary bladder is distended, and the appearance may be  related to urinary retention. The prostate gland is absent. Patient is  again noted to have some wall thickening involving the rectum, which  could reflect proctitis. Large volume of stool is noted throughout the  colon, suggesting constipation. There is no evidence of small bowel  obstruction. There are aortoiliac calcifications. Sacral nerve  stimulator is noted. No acute osseous abnormalities are seen. There is  no evidence of appendicitis. Right testicle appears to be currently  positioned within the right inguinal canal.       Impression:         1. Chronic postoperative changes noted at the right lung base.  2. Bilateral hydroureteronephrosis, which may be secondary to urinary  retention.  3. Wall thickening involving the rectum. Similar findings were present  in May 2022 and other prior studies. Appearance may reflect nonspecific  proctitis.  4. Large volume of stool throughout the colon, suggesting  constipation.  Radiation dose reduction techniques were utilized, including automated  exposure control and exposure modulation based on body size.        This report was finalized on 11/15/2023 12:32 AM by Dr. Melani Washington M.D on Workstation: [id number]       XR Chest 1 View [[id number]] Collected: 11/14/23 2322     Updated: 11/14/23 2327    Narrative:      SINGLE VIEW OF THE CHEST     HISTORY: Fever     COMPARISON: None available.     FINDINGS:  This patient has a history of esophagectomy and gastric pull-up. The  appearance of the mediastinum is similar when compared to the exam from  August 5, 2023. There is blunting of the right costophrenic angle. On  previous study, this appears to be secondary to chronic effusion. There  is scarring noted at the right lung base, although I cannot exclude a  superimposed infiltrate. Left lung is clear. No pneumothorax is seen.       Impression:      Chronic scarring identified at the right lung base. Superimposed  infiltrate cannot be excluded.     This report was finalized on 11/14/2023 11:24 PM by Dr. Melani Washington M.D on Workstation: [id number]                  Assessment:     Urinary retention  UTI  Acute kidney injury    Plan:     Likely UTI/pyelonephritis as well as urinary retention/hydronephrosis/ALEC  - place indwelling lama catheter. We will plan to leave the lama in place upon discharge  - continue IV Rocephin  - IV fluids at 100 cc per hour  - Urine culture, blood culture  - we will plan to reimage in 2-3 days    Darian Montoya Jr., MD  11/15/23  07:08 EST

## 2023-11-15 NOTE — H&P
[facility] Medicine Services  HISTORY AND PHYSICAL    Patient Name: Jose Hurtado  : 1950  MRN: [id number]  Primary Care Physician: Brandin Bolton MD  Date of admission: 2023    Subjective   Subjective   Chief Complaint:  Fever and nausea    HPI:  Jose Hurtado is a 73 y.o. male with past medical history of gastroparesis, previous esophagectomy, reported history of prostate cancer and recurrent urinary tract infection with chronic urinary retention presents the hospital with 1 day history of severe general weakness that is worse with exertion and improved with rest associated with high fever and nausea.  Patient was found to have infection and is being hospitalized for further medical management      Review of Systems   Constitutional:  Positive for fatigue and fever.   HENT: Negative.     Eyes: Negative.    Respiratory:  Negative for cough and shortness of breath.    Cardiovascular: Negative.    Gastrointestinal:  Positive for nausea. Negative for diarrhea.   Endocrine: Negative.    Genitourinary:  Positive for difficulty urinating and dysuria.   Musculoskeletal: Negative.    Skin: Negative.    Allergic/Immunologic: Negative.    Neurological: Negative.    Hematological: Negative.    Psychiatric/Behavioral: Negative.        Personal History     Past Medical History:   Diagnosis Date    Acute deep vein thrombosis (DVT) of popliteal vein of left lower extremity 2020    Anemia     Anti-phospholipid syndrome     On lifelong anticoagulation therapy    Bladder disorder     LEAKAGE   ON MED  wers pads    Bleeding hemorrhoids     Chronic kidney disease     Community acquired pneumonia     HISTORY OF IN     Congestive heart failure     COPD (chronic obstructive pulmonary disease)     Deep venous thrombosis ,     Left lower extremity multiple    Depression     Esophageal carcinoma 2014    had chemo and radiation prior surgery    Gastroparesis     Hemorrhoids     HH (hiatus hernia)      History of atrial fibrillation 2015    ONE EPISODE WHILE HOSPITALIZED    History of kidney stones     History of nephrolithiasis     History of pancreatitis     PT STATES MANY YEARS AGO    History of radiation therapy     History of transfusion     Hypertension     Long-term (current) use of anticoagulants, INR goal 2.0-3.0     Lymphedema     Malignant neoplasm of prostate     Other hyperlipidemia 01/30/2018 January 30, 2018 lipid panel risk 12.8%    Restless legs syndrome     Sleep apnea     OCCASIONALLY WEARS CPAP    Squamous carcinoma     on the head       Past Surgical History:   Procedure Laterality Date    APPENDECTOMY  1950    BRONCHOSCOPY      (Diagnostic)    CARDIAC CATHETERIZATION N/A 05/14/2022    Procedure: Left Heart Cath;  Surgeon: Eric So MD;  Location: [facility] CATH INVASIVE LOCATION;  Service: Cardiology;  Laterality: N/A;    CARDIAC CATHETERIZATION N/A 05/14/2022    Procedure: Coronary angiography;  Surgeon: Eric So MD;  Location: [facility] CATH INVASIVE LOCATION;  Service: Cardiology;  Laterality: N/A;    CARDIAC CATHETERIZATION N/A 05/14/2022    Procedure: Left ventriculography;  Surgeon: Eric So MD;  Location: [facility] CATH INVASIVE LOCATION;  Service: Cardiology;  Laterality: N/A;    CATARACT EXTRACTION Bilateral 2014    COLONOSCOPY  12/15/2014    Complete / Description: EH, IH, torts, stool, follow-up colonoscopy due in 5 years.    COLONOSCOPY N/A 06/13/2017    non-thrombosed external hemorrhoids, normal examined ileum, IH    COLONOSCOPY N/A 02/26/2019    Procedure: COLONOSCOPY with Cold Polypectomy;  Surgeon: Mulugeta Millan MD;  Location: [facility] ENDOSCOPY;  Service: Gastroenterology    COLONOSCOPY N/A 12/16/2020    Procedure: COLONOSCOPY to cecum into TI;  Surgeon: Mesha Sanchez MD;  Location: [facility] ENDOSCOPY;  Service: Gastroenterology;  Laterality: N/A;  pre: lower GI bleed  post: hemorrhoids     CYSTOSCOPY W/ LASER LITHOTRIPSY       ENDOSCOPY N/A 06/13/2017    Procedure: ESOPHAGOGASTRODUODENOSCOPY WITH COLD BIOPSY;  Surgeon: Lake Gonzalez MD;  Location: [facility] ENDOSCOPY;  Service:     ENDOSCOPY N/A 11/18/2021    Procedure: ESOPHAGOGASTRODUODENOSCOPY WITH  DILATATION WITH FLUOROSCOPY;  Surgeon: Jose Christine III, MD;  Location: [facility] ENDOSCOPY;  Service: Gastroenterology;  Laterality: N/A;  Pre: dysphagia, h/x of adinocarcinoma of esophagus  Post: same    ENDOSCOPY N/A 08/07/2023    Procedure: ESOPHAGOGASTRODUODENOSCOPY;  Surgeon: Jameel Valencia MD;  Location: [facility] ENDOSCOPY;  Service: Gastroenterology;  Laterality: N/A;  PRE- DYSPHAGIA  POST-ABORTED DUE TO RETAINED FOOD    ENDOSCOPY N/A 08/08/2023    Procedure: ESOPHAGOGASTRODUODENOSCOPY with bx;  Surgeon: Jameel Valencia MD;  Location: [facility] ENDOSCOPY;  Service: Gastroenterology;  Laterality: N/A;  pre: N/V, abd pain  post: esophageal nodule, retained food    ESOPHAGECTOMY      April 2015, stage IIB esophageal carcinoma, sub-total resection.    ESOPHAGECTOMY      Esophagectomy Subtotal [place] Procedure    EXCISION LESION  08/2012    Removal of Squamous Cell CA on Head    HAMMER TOE REPAIR  09/2014    Hammertoe Operation (Each Toe), 10/2014    HAMMER TOE REPAIR Left 10/03/2017    Procedure: Left second third and fourth distal interphalangeal joint resection with flexor tenotomy;  Surgeon: Mulugeta Lira MD;  Location: [facility] OR OSC;  Service:     HEMORRHOIDECTOMY N/A 12/23/2020    Procedure: HEMORRHOIDECTOMY;  Surgeon: Billy Julio Jr., MD;  Location: [facility] MAIN OR;  Service: General;  Laterality: N/A;    HERNIA REPAIR      incisional    JEJUNOSTOMY      Laparoscopic    JEJUNOSTOMY      tube removal     KNEE SURGERY Bilateral 1967, 1973, 1981    PATELLA SURGERY Left     removed    PILONIDAL CYST / SINUS EXCISION      OK ARTHRP KNE CONDYLE&PLATU MEDIAL&LAT COMPARTMENTS Right 03/26/2018    Procedure: TOTAL KNEE ARTHROPLASTY;  Surgeon: Renny Solis MD;  Location:  [facility] MAIN OR;  Service: Orthopedics    PROSTATECTOMY  2010    PYLOROPLASTY      SIGMOIDOSCOPY N/A 08/02/2023    Procedure: SIGMOIDOSCOPY FLEXIBLE;  Surgeon: Mesha Sanchez MD;  Location: [facility] ENDOSCOPY;  Service: Gastroenterology;  Laterality: N/A;  PRE- ABNORMAL CT  POST- HEMORRHOIDS, ULCERATION    SPINAL FUSION  02/1998    C 5,6    TONSILLECTOMY      UPPER GASTROINTESTINAL ENDOSCOPY  12/15/2014    LA Grade D esophagitis, Pardo's, HH, multiple duodenal ulcers    VENTRAL/INCISIONAL HERNIA REPAIR N/A 04/14/2016    Procedure: VENTRAL/INCISIONAL HERNIA REPAIR, open, with mesh, and component separation;  Surgeon: Darren Rivas MD;  Location: [facility] MAIN OR;  Service:        Family History: family history includes Anxiety disorder in his father; Cancer in his father; Cerebral aneurysm in his mother; No Known Problems in his brother; Pancreatic cancer in his nephew; Prostate cancer (age of onset: 68) in his brother; Suicide Attempts in his father. Other pertinent FHx was reviewed and unremarkable.     Social History:  reports that he quit smoking about 49 years ago. His smoking use included cigarettes. He has a 6.00 pack-year smoking history. He has quit using smokeless tobacco.  His smokeless tobacco use included chew. He reports current alcohol use of about 3.0 standard drinks of alcohol per week. He reports that he does not use drugs.  Social History     Social History Narrative    -self-employed     -lives with spouse at home       Medications:  Available home medication information reviewed.    Allergies   Allergen Reactions    Sulfamethoxazole-Trimethoprim Nausea Only       Objective   Objective   Vital Signs:   Temp:  [97.7 °F (36.5 °C)-102 °F (38.9 °C)] 97.7 °F (36.5 °C)  Heart Rate:  [] 74  Resp:  [16-20] 20  BP: ()/(52-78) 111/78        Physical Exam   Constitutional: Awake, alert, elderly  Eyes: PERRLA, sclerae anicteric, no conjunctival injection  HENT: NCAT, mucous  membranes moist  Neck: Supple, no thyromegaly, no lymphadenopathy, trachea midline  Respiratory: No cough or wheezing, moderate inspiration, nonlabored respirations   Cardiovascular: Pulse rate is normal, palpable radial pulses bilaterally  Gastrointestinal:soft, nontender, nondistended  Musculoskeletal: Thin, BMI is 20, no bilateral ankle edema, no clubbing or cyanosis to extremities  Psychiatric: Appropriate affect, cooperative  Neurologic: Oriented, no slurred speech or facial droop, conversational, able to follow instructions, moving extremities  Skin: No rashes or jaundice      Results from last 7 days   Lab Units 11/15/23  0725   WBC 10*3/mm3 7.50   HEMOGLOBIN g/dL 8.4*   HEMATOCRIT % 26.1*   PLATELETS 10*3/mm3 220     Results from last 7 days   Lab Units 11/15/23  0725 11/14/23  2249   SODIUM mmol/L 136 138   POTASSIUM mmol/L 3.8 3.6   CHLORIDE mmol/L 103 102   CO2 mmol/L 26.1 27.0   BUN mg/dL 23 29*   CREATININE mg/dL 1.18 1.46*   GLUCOSE mg/dL 108* 104*   CALCIUM mg/dL 8.3* 9.0   ALK PHOS U/L  --  103   ALT (SGPT) U/L  --  15   AST (SGOT) U/L  --  19   LACTATE mmol/L  --  1.0     Estimated Creatinine Clearance: 59 mL/min (by C-G formula based on SCr of 1.18 mg/dL).  Brief Urine Lab Results  (Last result in the past 365 days)        Color   Clarity   Blood   Leuk Est   Nitrite   Protein   CREAT   Urine HCG        11/15/23 0036 Yellow   Cloudy   Moderate (2+)   Moderate (2+)   Negative   Negative                 Imaging Results (Last 24 Hours)       Procedure Component Value Units Date/Time    CT Abdomen Pelvis Without Contrast [[id number]] Collected: 11/15/23 0023     Updated: 11/15/23 0035    Narrative:      CT OF THE ABDOMEN AND PELVIS WITHOUT CONTRAST     HISTORY: Weakness     COMPARISON: July 29, 2023     TECHNIQUE: Axial CT imaging was obtained through the abdomen and pelvis.  No IV contrast was administered.     FINDINGS:  The patient is again noted to be status post esophagectomy and gastric  pull-up.  The configuration appears similar to the prior study. There is  scarring identified at the right lung base. There is a chronic loculated  right pleural effusion, as well as pleural thickening. Left lung base is  clear. No suspicious hepatic lesions are seen. Spleen, pancreas,  gallbladder, right adrenal gland, and duodenum appear normal. There is a  stable bulky appearance to the left adrenal gland. Punctate  nonobstructing stones are noted within both kidneys. Largest stone on  the right measures 4 mm. Stone on the left measures 4 mm as well. There  is mild to moderate bilateral hydroureteronephrosis. The patient had  similar findings on prior CT. No distal ureteral or bladder stones are  identified. The urinary bladder is distended, and the appearance may be  related to urinary retention. The prostate gland is absent. Patient is  again noted to have some wall thickening involving the rectum, which  could reflect proctitis. Large volume of stool is noted throughout the  colon, suggesting constipation. There is no evidence of small bowel  obstruction. There are aortoiliac calcifications. Sacral nerve  stimulator is noted. No acute osseous abnormalities are seen. There is  no evidence of appendicitis. Right testicle appears to be currently  positioned within the right inguinal canal.       Impression:         1. Chronic postoperative changes noted at the right lung base.  2. Bilateral hydroureteronephrosis, which may be secondary to urinary  retention.  3. Wall thickening involving the rectum. Similar findings were present  in May 2022 and other prior studies. Appearance may reflect nonspecific  proctitis.  4. Large volume of stool throughout the colon, suggesting constipation.  Radiation dose reduction techniques were utilized, including automated  exposure control and exposure modulation based on body size.        This report was finalized on 11/15/2023 12:32 AM by Dr. Melani Washington M.D on Workstation:  [id number]       XR Chest 1 View [[id number]] Collected: 11/14/23 2322     Updated: 11/14/23 2327    Narrative:      SINGLE VIEW OF THE CHEST     HISTORY: Fever     COMPARISON: None available.     FINDINGS:  This patient has a history of esophagectomy and gastric pull-up. The  appearance of the mediastinum is similar when compared to the exam from  August 5, 2023. There is blunting of the right costophrenic angle. On  previous study, this appears to be secondary to chronic effusion. There  is scarring noted at the right lung base, although I cannot exclude a  superimposed infiltrate. Left lung is clear. No pneumothorax is seen.       Impression:      Chronic scarring identified at the right lung base. Superimposed  infiltrate cannot be excluded.     This report was finalized on 11/14/2023 11:24 PM by Dr. Melani Washington M.D on Workstation: [id number]             Results for orders placed during the hospital encounter of 05/12/22    Adult Transthoracic Echo Complete w/ Color, Spectral and Contrast if necessary per protocol    Interpretation Summary  · Estimated right ventricular systolic pressure from tricuspid regurgitation is mildly elevated (35-45 mmHg). Calculated right ventricular systolic pressure from tricuspid regurgitation is 43 mmHg.  · Left ventricular wall thickness is consistent with mild concentric hypertrophy.  · Estimated left ventricular EF = 56% Left ventricular systolic function is normal.  · Left ventricular diastolic function was normal.      Assessment & Plan   Assessment & Plan     Active Hospital Problems    Diagnosis  POA    **Acute UTI [N39.0]  Yes    ALEC (acute kidney injury) [N17.9]  Yes    Sepsis [A41.9]  Yes    Bilateral hydronephrosis [N13.30]  Yes    Scarring of lung [J98.4]  Yes    Wound of left great toe [S91.102A]  Yes    Dehydration [E86.0]  Yes    Weight loss [R63.4]  Yes     Added automatically from request for surgery [id number]      Acute urinary retention [R33.8]  Yes     Chronic obstructive pulmonary disease [J44.9]  Yes    Atrial fibrillation [I48.91]  Yes    CKD (chronic kidney disease) stage 2, GFR 60-89 ml/min [N18.2]  Yes    History of recurrent deep vein thrombosis (DVT) [Z86.718]  Not Applicable    Gastroparesis [K31.84]  Yes    B12 deficiency [E53.8]  Yes     Continue B12 injections monthly.       Peripheral polyneuropathy [G62.9]  Yes     + severe B12 deficiency (6/7/19)  Continue B12 injections monthly      Anemia [D64.9]  Yes    Hypertension [I10]  Yes    History of esophageal cancer [Z85.01]  Yes     Dx'ed 2014: s/p surgery ([facility]), XRT, chemotherapy  *CBC (Code)      RLS (restless legs syndrome) [G25.81]  Yes     Continue pramipexole 1.5 mg two daily.        73-year-old male with multiple chronic medical issues presents the hospital with sepsis secondary to urinary tract infection with urine obstruction causing bilateral hydronephrosis and acute kidney injury.    Sepsis due to urinary tract infection:  Initially with fevers, tachycardia, and tachypnea  Improved with broad-spectrum antibiotic coverage.  I will speak to microbiology and see if they can add on a culture to earlier urine sample.  Supportive care and symptom treatment.  IV fluid resuscitation.    Urine retention with bilateral hydronephrosis:  Acute on chronic issue.  Continue Flomax.  Urology consult.  They have instructed just to insert King catheter.  King catheter ordered.  Patient will need to follow-up in urology clinic.    Acute kidney injury with CKD 2:  Previous records reviewed and baseline creatinine appears to be 1.0-1.1.  Patient was 1.46 at admission.  Improved with IV fluids.    Gastroparesis, weight loss, poor nutrition:  Encourage oral intake.  Consult nutrition for malnutrition assessment.  Continue home medications.    Anemia:  Acute on chronic issue.  Some drop could be from dilution.  Baseline hemoglobin appears to be 9.4.  No bleeding reported.  Previous studies seem to be consistent  with anemia of chronic disease with probable iron deficiency and noted history of B12 deficiency.  Trend anemia and monitor for any sign of bleeding.    COPD:  Continue Spiriva.  Nebulizers as needed.    Peripheral neuropathy and restless leg: Continue home medication.  Monitor.    Paroxysmal atrial fibrillation and essential hypertension:  Continue anticoagulation  Diuretic held initially    Left great toe wound: Consult wound care to evaluate    Treatment plan discussed with the patient who is in agreement.      DVT prophylaxis: Anticoagulated    CODE STATUS:    Code Status and Medical Interventions:   Ordered at: 11/15/23 0232     Code Status (Patient has no pulse and is not breathing):    CPR (Attempt to Resuscitate)     Medical Interventions (Patient has pulse or is breathing):    Full Support       Enoc Suero MD  11/15/23

## 2023-11-15 NOTE — CONSULTS
Nutrition Services    Patient Name:  Jose Hurtado  YOB: 1950  MRN: [id number]  Admit Date:  11/14/2023    Assessment Date:  11/15/23    Summary: Nutrition consult for malnutrition severity assessment.  Admitted with acute UTI.  History of prostate cancer s/p IMRT, recurrent UTI, chronic urinary retention.      Possible weight loss noted per chart weight history.  No PO intake available at this time.    Unavailable at time of attempted visit.  RD to follow up to interview.    CLINICAL NUTRITION ASSESSMENT      Reason for Assessment Malnutrition Severity Assessment (MSA), Physician Consult     Diagnosis/Problem   Acute UTI   Medical/Surgical History Past Medical History:   Diagnosis Date    Acute deep vein thrombosis (DVT) of popliteal vein of left lower extremity 03/24/2020    Anemia     Anti-phospholipid syndrome     On lifelong anticoagulation therapy    Bladder disorder     LEAKAGE   ON MED  wers pads    Bleeding hemorrhoids     Chronic kidney disease     Community acquired pneumonia     HISTORY OF IN 2014    Congestive heart failure     COPD (chronic obstructive pulmonary disease)     Deep venous thrombosis 2006, 2008    Left lower extremity multiple    Depression     Esophageal carcinoma 12/31/2014    had chemo and radiation prior surgery    Gastroparesis     Hemorrhoids     HH (hiatus hernia)     History of atrial fibrillation 2015    ONE EPISODE WHILE HOSPITALIZED    History of kidney stones     History of nephrolithiasis     History of pancreatitis     PT STATES MANY YEARS AGO    History of radiation therapy     History of transfusion     Hypertension     Long-term (current) use of anticoagulants, INR goal 2.0-3.0     Lymphedema     Malignant neoplasm of prostate     Other hyperlipidemia 01/30/2018 January 30, 2018 lipid panel risk 12.8%    Restless legs syndrome     Sleep apnea     OCCASIONALLY WEARS CPAP    Squamous carcinoma     on the head       Past Surgical History:   Procedure  Laterality Date    APPENDECTOMY  1950    BRONCHOSCOPY      (Diagnostic)    CARDIAC CATHETERIZATION N/A 05/14/2022    Procedure: Left Heart Cath;  Surgeon: Eric So MD;  Location: [facility] CATH [facility]VASIVE LOCATION;  Service: Cardiology;  Laterality: N/A;    CARDIAC CATHETERIZATION N/A 05/14/2022    Procedure: Coronary angiography;  Surgeon: Eric So MD;  Location: [facility] CATH [facility]ASIVE LOCATION;  Service: Cardiology;  Laterality: N/A;    CARDIAC CATHETERIZATION N/A 05/14/2022    Procedure: Left ventriculography;  Surgeon: Eric So MD;  Location: [facility] CATH INVASIVE LOCATION;  Service: Cardiology;  Laterality: N/A;    CATARACT EXTRACTION Bilateral 2014    COLONOSCOPY  12/15/2014    Complete / Description: EH, IH, torts, stool, follow-up colonoscopy due in 5 years.    COLONOSCOPY N/A 06/13/2017    non-thrombosed external hemorrhoids, normal examined ileum, IH    COLONOSCOPY N/A 02/26/2019    Procedure: COLONOSCOPY with Cold Polypectomy;  Surgeon: Mulugeta Millan MD;  Location: [facility] ENDOSCOPY;  Service: Gastroenterology    COLONOSCOPY N/A 12/16/2020    Procedure: COLONOSCOPY to cecum into TI;  Surgeon: Mesha Sanchez MD;  Location: [facility] ENDOSCOPY;  Service: Gastroenterology;  Laterality: N/A;  pre: lower GI bleed  post: hemorrhoids     CYSTOSCOPY W/ LASER LITHOTRIPSY      ENDOSCOPY N/A 06/13/2017    Procedure: ESOPHAGOGASTRODUODENOSCOPY WITH COLD BIOPSY;  Surgeon: Lake Gonzalez MD;  Location: [facility] ENDOSCOPY;  Service:     ENDOSCOPY N/A 11/18/2021    Procedure: ESOPHAGOGASTRODUODENOSCOPY WITH  DILATATION WITH FLUOROSCOPY;  Surgeon: Jose Christine III, MD;  Location: [facility] ENDOSCOPY;  Service: Gastroenterology;  Laterality: N/A;  Pre: dysphagia, h/x of adinocarcinoma of esophagus  Post: same    ENDOSCOPY N/A 08/07/2023    Procedure: ESOPHAGOGASTRODUODENOSCOPY;  Surgeon: Jameel Valencia MD;  Location: [facility] ENDOSCOPY;  Service: Gastroenterology;  Laterality:  N/A;  PRE- DYSPHAGIA  POST-ABORTED DUE TO RETAINED FOOD    ENDOSCOPY N/A 08/08/2023    Procedure: ESOPHAGOGASTRODUODENOSCOPY with bx;  Surgeon: Jameel Valencia MD;  Location: [facility] ENDOSCOPY;  Service: Gastroenterology;  Laterality: N/A;  pre: N/V, abd pain  post: esophageal nodule, retained food    ESOPHAGECTOMY      April 2015, stage IIB esophageal carcinoma, sub-total resection.    ESOPHAGECTOMY      Esophagectomy Subtotal Andrea Joe Procedure    EXCISION LESION  08/2012    Removal of Squamous Cell CA on Head    HAMMER TOE REPAIR  09/2014    Hammertoe Operation (Each Toe), 10/2014    HAMMER TOE REPAIR Left 10/03/2017    Procedure: Left second third and fourth distal interphalangeal joint resection with flexor tenotomy;  Surgeon: Mulugeta Lira MD;  Location:  [facility];  Service:     HEMORRHOIDECTOMY N/A 12/23/2020    Procedure: HEMORRHOIDECTOMY;  Surgeon: Billy Julio Jr., MD;  Location: [facility] MAIN OR;  Service: General;  Laterality: N/A;    HERNIA REPAIR      incisional    JEJUNOSTOMY      Laparoscopic    JEJUNOSTOMY      tube removal     KNEE SURGERY Bilateral 1967, 1973, 1981    PATELLA SURGERY Left     removed    PILONIDAL CYST / SINUS EXCISION      GA ARTHRP KNE CONDYLE&PLATU MEDIAL&LAT COMPARTMENTS Right 03/26/2018    Procedure: TOTAL KNEE ARTHROPLASTY;  Surgeon: Renny Solis MD;  Location: [facility] MAIN OR;  Service: Orthopedics    PROSTATECTOMY  2010    PYLOROPLASTY      SIGMOIDOSCOPY N/A 08/02/2023    Procedure: SIGMOIDOSCOPY FLEXIBLE;  Surgeon: Mesha Sanchez MD;  Location: [facility] ENDOSCOPY;  Service: Gastroenterology;  Laterality: N/A;  PRE- ABNORMAL CT  POST- HEMORRHOIDS, ULCERATION    SPINAL FUSION  02/1998    C 5,6    TONSILLECTOMY      UPPER GASTROINTESTINAL ENDOSCOPY  12/15/2014    LA Grade D esophagitis, Pardo's, HH, multiple duodenal ulcers    VENTRAL/INCISIONAL HERNIA REPAIR N/A 04/14/2016    Procedure: VENTRAL/INCISIONAL HERNIA REPAIR, open, with mesh, and component  "separation;  Surgeon: Darren Rivas MD;  Location: [facility] OR;  Service:         Anthropometrics        Current Height  Current Weight  BMI kg/m2 Height: 195.6 cm (77\")  Weight: 77.1 kg (170 lb) (11/14/23 2227)  Body mass index is 20.16 kg/m².   Adjusted BMI (if applicable)    BMI Category Normal/Healthy (18.4 - 24.9)   Ideal Body Weight (IBW) 208 lb (94.5 kg)   Usual Body Weight (UBW) Unsure   Weight Trend Loss   Weight History Wt Readings from Last 30 Encounters:   11/14/23 2227 77.1 kg (170 lb)   11/13/23 1411 75.8 kg (167 lb 3.2 oz)   11/09/23 1259 77.1 kg (169 lb 14.4 oz)   11/08/23 1113 75.8 kg (167 lb)   09/28/23 1558 75.8 kg (167 lb 3.2 oz)   09/27/23 1507 76.2 kg (168 lb)   08/17/23 1303 81.3 kg (179 lb 3.2 oz)   08/17/23 1054 81.1 kg (178 lb 12.8 oz)   08/15/23 1404 79.3 kg (174 lb 14.4 oz)   08/05/23 1514 77.1 kg (170 lb)   08/05/23 1300 81.6 kg (180 lb)   08/02/23 1439 86.2 kg (190 lb)   07/30/23 1145 78.4 kg (172 lb 13.5 oz)   07/29/23 0941 81.6 kg (180 lb)   07/27/23 1015 81.6 kg (180 lb)   06/23/23 1405 85.5 kg (188 lb 6.4 oz)   05/16/23 1253 84.1 kg (185 lb 6.4 oz)   05/12/23 0734 90.7 kg (200 lb)   04/04/23 1323 93 kg (205 lb)   03/22/23 1254 93.4 kg (205 lb 12.8 oz)   02/17/23 1117 96.1 kg (211 lb 12.8 oz)   02/15/23 1044 93.5 kg (206 lb 1.6 oz)   02/10/23 1351 91.2 kg (201 lb)   01/20/23 1058 99.8 kg (220 lb)   01/03/23 0629 95.4 kg (210 lb 6.4 oz)   01/02/23 2331 97.5 kg (215 lb)   12/28/22 1123 104 kg (230 lb 3.2 oz)   12/21/22 1134 102 kg (224 lb)   12/16/22 1041 103 kg (227 lb)   12/05/22 1123 103 kg (227 lb)   11/22/22 1430 99 kg (218 lb 3.2 oz)   10/10/22 1001 107 kg (235 lb)   09/21/22 1126 105 kg (232 lb)      --  Labs       Pertinent Labs    Results from last 7 days   Lab Units 11/15/23  0725 11/14/23  2249   SODIUM mmol/L 136 138   POTASSIUM mmol/L 3.8 3.6   CHLORIDE mmol/L 103 102   CO2 mmol/L 26.1 27.0   BUN mg/dL 23 29*   CREATININE mg/dL 1.18 1.46*   CALCIUM mg/dL 8.3* " 9.0   BILIRUBIN mg/dL  --  0.4   ALK PHOS U/L  --  103   ALT (SGPT) U/L  --  15   AST (SGOT) U/L  --  19   GLUCOSE mg/dL 108* 104*     Results from last 7 days   Lab Units 11/15/23  0725 11/14/23  2249   HEMOGLOBIN g/dL 8.4* 9.9*   HEMATOCRIT % 26.1* 30.4*   WBC 10*3/mm3 7.50 5.86   ALBUMIN g/dL  --  3.5     Results from last 7 days   Lab Units 11/15/23  0725 11/14/23  2249 11/09/23  1249   PLATELETS 10*3/mm3 220 252 274     COVID19   Date Value Ref Range Status   11/14/2023 Not Detected Not Detected - Ref. Range Final     Lab Results   Component Value Date    HGBA1C 5.50 08/06/2021          Medications           Scheduled Medications apixaban, 5 mg, Oral, Q12H  atorvastatin, 40 mg, Oral, Nightly  [START ON 11/16/2023] cefTRIAXone, 1,000 mg, Intravenous, Q24H  [Held by provider] furosemide, 40 mg, Oral, Daily  gabapentin, 100 mg, Oral, TID  metoclopramide, 5 mg, Oral, TID AC  [START ON 11/16/2023] pantoprazole, 40 mg, Oral, Q AM  PARoxetine, 40 mg, Oral, QAM  polyethylene glycol, 17 g, Oral, Daily  potassium chloride, 10 mEq, Oral, Daily  pramipexole, 1.5 mg, Oral, BID  senna-docusate sodium, 2 tablet, Oral, BID  sodium chloride, 10 mL, Intravenous, Q12H  tamsulosin, 0.4 mg, Oral, Daily  tiotropium bromide monohydrate, 2 puff, Inhalation, Daily       Infusions sodium chloride, 75 mL/hr, Last Rate: 75 mL/hr (11/15/23 0536)       PRN Medications   acetaminophen **OR** acetaminophen **OR** acetaminophen    albuterol    senna-docusate sodium **AND** polyethylene glycol **AND** bisacodyl **AND** bisacodyl    calcium carbonate    ondansetron **OR** ondansetron    sodium chloride    sodium chloride    [COMPLETED] Insert Peripheral IV **AND** sodium chloride    sodium chloride    sodium chloride     Physical Findings          General Findings alert, oriented, room air   Oral/Mouth Cavity dental appliance upper dentures   Edema  no edema   Gastrointestinal abdominal pain, nausea, non-distended , last bowel movement: 11/14    Skin  location of wound: L great toe   Tubes/Drains/Lines none   NFPE Other: RD to follow up to perform   --  Current Nutrition Orders & Evaluation of Intake       Oral Nutrition     Food Allergies NKFA   Current PO Diet Diet: Regular/House Diet; Texture: Regular Texture (IDDSI 7); Fluid Consistency: Thin (IDDSI 0)   Supplement n/a   PO Evaluation     % PO Intake No intake available     Factors Affecting Intake: decreased appetite   --  PES STATEMENT / NUTRITION DIAGNOSIS      Nutrition Dx Problem  Problem: Predicted Suboptimal Intake  Etiology: Medical Diagnosis - acute UTI    Signs/Symptoms: Report/Observation     NUTRITION INTERVENTION / PLAN OF CARE      Intervention Goal(s) Maintain nutrition status, Reduce/improve symptoms, Disease management/therapy, Establish PO intake, Tolerate PO , and Maintain weight         RD Intervention/Action Continue to monitor and Care plan reviewed   --      Prescription/Orders:       PO Diet       Supplements       Enteral Nutrition       Parenteral Nutrition    New Prescription Ordered? No changes at this time   --      Monitor/Evaluation Per protocol, PO intake, Pertinent labs, Weight, Skin status, Symptoms   Discharge Plan/Needs Pending clinical course   --    RD to follow per protocol.      Electronically signed by:  Luda Puente RD  11/15/23 15:59 EST

## 2023-11-15 NOTE — ED PROVIDER NOTES
EMERGENCY DEPARTMENT ENCOUNTER    Room Number:  18/18  PCP: Brandin oBlton MD  Historian: Patient, spouse, EMS      HPI:  Chief Complaint: Retching, weakness  A complete HPI/ROS/PMH/PSH/SH/FH are unobtainable due to: Nothing  Context: Jose Hurtado is a 73 y.o. male, with a history of gastroparesis and previous esophagectomy, who presents to the ED from home by EMS c/o generalized weakness and retching since around 12 PM.  Patient reports about 10 episodes of retching.  He is spitting out whitish sputum.  He denies abdominal pain, diarrhea, constipation, flank pain, hematuria, or dysuria.  He also complains of generalized weakness.  Wife reports that he had some chills earlier today.  He was unaware that he had a fever.  Temperature was 102 at triage.  He has had a nonproductive cough as well.  Denies sore throat, chest pain, or shortness of breath.  He was started on gabapentin a few days ago.  He has not been taking Reglan regularly.            PAST MEDICAL HISTORY  Active Ambulatory Problems     Diagnosis Date Noted    History of esophageal cancer 02/11/2016    Adenomatous polyp of colon 02/11/2016    Malignant neoplasm of prostate 02/11/2016    RLS (restless legs syndrome) 02/11/2016    History of DVT (deep vein thrombosis) 03/22/2016    Recurrent major depressive disorder, in partial remission 01/26/2017    Hypertension 04/12/2018    Anemia 04/18/2019    Insomnia due to other mental disorder 06/06/2019    Peripheral polyneuropathy 06/06/2019    B12 deficiency 06/07/2019    Lymphedema 01/14/2020    Iron deficiency 02/10/2020    Gastroparesis 02/15/2020    History of recurrent deep vein thrombosis (DVT) 03/24/2020    Tremor of both hands 07/27/2020    Gastrointestinal hemorrhage 12/15/2020    Acute blood loss anemia 12/16/2020    Pancytopenia 12/16/2020    Chronic venous hypertension due to DVT 12/16/2020    Bleeding hemorrhoid 12/17/2020    Malabsorption of iron 01/05/2021    Iron deficiency anemia 01/05/2021     History of esophageal cancer 05/11/2021    Abnormal CT scan 05/11/2021    Generalized weakness 05/11/2021    Chronic anticoagulation 05/11/2021    CKD (chronic kidney disease) stage 2, GFR 60-89 ml/min 05/12/2021    NSTEMI (non-ST elevated myocardial infarction) 05/12/2022    Bronchitis 05/13/2022    Dyspnea 12/16/2022    Elevated troponin 01/03/2023    Atrial fibrillation 01/03/2023    Chronic obstructive pulmonary disease 01/04/2023    Congestive heart failure 02/10/2023    Gait instability 05/12/2023    Dark stools 06/24/2023    Proctitis 07/29/2023    Severe malnutrition 08/04/2023    Other symptoms and signs concerning food and fluid intake 08/05/2023    Weight loss 08/06/2023    Dehydration 08/09/2023     Resolved Ambulatory Problems     Diagnosis Date Noted    Dysphagia 02/11/2016    Duodenal ulcer 02/11/2016    Decreased body weight 02/11/2016    Cough     Hyperkalemia 02/15/2016    DVT (deep venous thrombosis) 08/24/2017    Other hyperlipidemia 01/30/2018    Anti-phospholipid syndrome 05/10/2018    Postsurgical malabsorption 10/25/2019    Chronic obstructive pulmonary disease with acute exacerbation 12/16/2020    Pleuritic chest pain 05/11/2021    Urinary tract infection due to extended-spectrum beta lactamase (ESBL) producing Escherichia coli 05/11/2021    Dysphagia 11/10/2021    PVC's (premature ventricular contractions) 03/16/2022    RSV (acute bronchiolitis due to respiratory syncytial virus) 01/03/2023    Acute respiratory failure with hypoxia 01/04/2023    Nausea and vomiting 07/29/2023    Acute urinary retention 07/29/2023    Nausea 08/05/2023    Dysphagia 08/05/2023     Past Medical History:   Diagnosis Date    Acute deep vein thrombosis (DVT) of popliteal vein of left lower extremity 03/24/2020    Bladder disorder     Bleeding hemorrhoids     Chronic kidney disease     Community acquired pneumonia     COPD (chronic obstructive pulmonary disease)     Deep venous thrombosis 2006, 2008    Depression      Esophageal carcinoma 12/31/2014    Hemorrhoids     HH (hiatus hernia)     History of atrial fibrillation 2015    History of kidney stones     History of nephrolithiasis     History of pancreatitis     History of radiation therapy     History of transfusion     Long-term (current) use of anticoagulants, INR goal 2.0-3.0     Restless legs syndrome     Sleep apnea     Squamous carcinoma          PAST SURGICAL HISTORY  Past Surgical History:   Procedure Laterality Date    APPENDECTOMY  1950    BRONCHOSCOPY      (Diagnostic)    CARDIAC CATHETERIZATION N/A 05/14/2022    Procedure: Left Heart Cath;  Surgeon: Eric So MD;  Location: [facility] CATH INVASIVE LOCATION;  Service: Cardiology;  Laterality: N/A;    CARDIAC CATHETERIZATION N/A 05/14/2022    Procedure: Coronary angiography;  Surgeon: Eric So MD;  Location[facility] CATH [facility]VASIVE LOC[facility];  Service: Cardiology;  Laterality: N/A;    CARDIAC CATHETERIZATION N/A 05/14/2022    Procedure: Left ventriculography;  Surgeon: Eric So MD;  Location: [facility] CATH INVASIVE LOCATION;  Service: Cardiology;  Laterality: N/A;    CATARACT EXTRACTION Bilateral 2014    COLONOSCOPY  12/15/2014    Complete / Description: EH, IH, torts, stool, follow-up colonoscopy due in 5 years.    COLONOSCOPY N/A 06/13/2017    non-thrombosed external hemorrhoids, normal examined ileum, IH    COLONOSCOPY N/A 02/26/2019    Procedure: COLONOSCOPY with Cold Polypectomy;  Surgeon: Mulugeta Millan MD;  Location: [facility] ENDOSCOPY;  Service: Gastroenterology    COLONOSCOPY N/A 12/16/2020    Procedure: COLONOSCOPY to cecum into TI;  Surgeon: Mesha Sanchez MD;  Location: [facility] ENDOSCOPY;  Service: Gastroenterology;  Laterality: N/A;  pre: lower GI bleed  post: hemorrhoids     CYSTOSCOPY W/ LASER LITHOTRIPSY      ENDOSCOPY N/A 06/13/2017    Procedure: ESOPHAGOGASTRODUODENOSCOPY WITH COLD BIOPSY;  Surgeon: Lake Gonzalez MD;  Location: [facility] ENDOSCOPY;   Service:     ENDOSCOPY N/A 11/18/2021    Procedure: ESOPHAGOGASTRODUODENOSCOPY WITH  DILATATION WITH FLUOROSCOPY;  Surgeon: Jose Christine III, MD;  Location: [facility] ENDOSCOPY;  Service: Gastroenterology;  Laterality: N/A;  Pre: dysphagia, h/x of adinocarcinoma of esophagus  Post: same    ENDOSCOPY N/A 08/07/2023    Procedure: ESOPHAGOGASTRODUODENOSCOPY;  Surgeon: Jameel Valencia MD;  Location: [facility] ENDOSCOPY;  Service: Gastroenterology;  Laterality: N/A;  PRE- DYSPHAGIA  POST-ABORTED DUE TO RETAINED FOOD    ENDOSCOPY N/A 08/08/2023    Procedure: ESOPHAGOGASTRODUODENOSCOPY with bx;  Surgeon: Jameel Valencia MD;  Location: [facility] ENDOSCOPY;  Service: Gastroenterology;  Laterality: N/A;  pre: N/V, abd pain  post: esophageal nodule, retained food    ESOPHAGECTOMY      April 2015, stage IIB esophageal carcinoma, sub-total resection.    ESOPHAGECTOMY      Esophagectomy Subtotal [place] Joe Procedure    EXCISION LESION  08/2012    Removal of Squamous Cell CA on Head    HAMMER TOE REPAIR  09/2014    Hammertoe Operation (Each Toe), 10/2014    HAMMER TOE REPAIR Left 10/03/2017    Procedure: Left second third and fourth distal interphalangeal joint resection with flexor tenotomy;  Surgeon: Mulugeta Lira MD;  Location: [facility] OR [facility];  Service:     HEMORRHOIDECTOMY N/A 12/23/2020    Procedure: HEMORRHOIDECTOMY;  Surgeon: Billy Julio Jr., MD;  Location: [facility] MAIN OR;  Service: General;  Laterality: N/A;    HERNIA REPAIR      incisional    JEJUNOSTOMY      Laparoscopic    JEJUNOSTOMY      tube removal     KNEE SURGERY Bilateral 1967, 1973, 1981    PATELLA SURGERY Left     removed    PILONIDAL CYST / SINUS EXCISION      ID ARTHRP KNE CONDYLE&PLATU MEDIAL&LAT COMPARTMENTS Right 03/26/2018    Procedure: TOTAL KNEE ARTHROPLASTY;  Surgeon: Renny Solis MD;  Location: [facility] MAIN OR;  Service: Orthopedics    PROSTATECTOMY  2010    PYLOROPLASTY      SIGMOIDOSCOPY N/A 08/02/2023    Procedure: SIGMOIDOSCOPY  "FLEXIBLE;  Surgeon: Mesha Sanchez MD;  Location: [facility];  Service: Gastroenterology;  Laterality: N/A;  PRE- ABNORMAL CT  POST- HEMORRHOIDS, ULCERATION    SPINAL FUSION  1998    C 5,6    TONSILLECTOMY      UPPER GASTROINTESTINAL ENDOSCOPY  12/15/2014    LA Grade D esophagitis, Pardo's, HH, multiple duodenal ulcers    VENTRAL/INCISIONAL HERNIA REPAIR N/A 2016    Procedure: VENTRAL/INCISIONAL HERNIA REPAIR, open, with mesh, and component separation;  Surgeon: Darren Rivas MD;  Location: [facility] MAIN OR;  Service:          FAMILY HISTORY  Family History   Problem Relation Age of Onset    Cerebral aneurysm Mother         cerebral artery aneurysm ( age 56)    Anxiety disorder Father     Suicide Attempts Father          of suicide    Cancer Father         bladder    Prostate cancer Brother 68    No Known Problems Brother     Pancreatic cancer Nephew     Colon cancer Neg Hx     Esophageal cancer Neg Hx     Dementia Neg Hx     Malig Hyperthermia Neg Hx          SOCIAL HISTORY  Social History     Socioeconomic History    Marital status:      Spouse name: Lena    Number of children: 0    Years of education: College   Tobacco Use    Smoking status: Former     Packs/day: 2.00     Years: 3.00     Additional pack years: 0.00     Total pack years: 6.00     Types: Cigarettes     Quit date:      Years since quittin.9    Smokeless tobacco: Former     Types: Chew    Tobacco comments:     Caffeine - coffee and soda    Vaping Use    Vaping Use: Never used   Substance and Sexual Activity    Alcohol use: Yes     Alcohol/week: 3.0 standard drinks of alcohol     Types: 1 Glasses of wine, 1 Cans of beer, 1 Shots of liquor per week     Comment: 2    Drug use: Never     Comment: hx marijuana use \"a long time ago\"    Sexual activity: Defer     Partners: Male         ALLERGIES  Sulfamethoxazole-trimethoprim    REVIEW OF SYSTEMS  All systems have been reviewed and are negative except " for those listed in the [facility]      PHYSICAL EXAM  ED Triage Vitals [11/14/23 2227]   Temp Heart Rate Resp BP SpO2   (!) 102 °F (38.9 °C) 101 18 113/52 96 %      Temp src Heart Rate Source Patient Position BP Location FiO2 (%)   Tympanic Monitor Lying Right arm --       Physical Exam      GENERAL: Awake, alert, oriented x3.  Well-developed and nontoxic-appearing elderly male.  No acute distress  HENT: NCAT, nares patent, oropharynx is benign  EYES: no scleral icterus  CV: regular rhythm, normal rate  RESPIRATORY: normal effort, clear to auscultation bilaterally  ABDOMEN: soft, nondistended, nontender, no CVA tenderness  MUSCULOSKELETAL: Extremities are nontender with full range of motion  NEURO: Speech is normal.  No facial droop.  Follows commands  PSYCH:  calm, cooperative  SKIN: warm, dry    Vital signs and nursing notes reviewed.          LAB RESULTS  Recent Results (from the past 24 hour(s))   Comprehensive Metabolic Panel    Collection Time: 11/14/23 10:49 PM    Specimen: Blood   Result Value Ref Range    Glucose 104 (H) 65 - 99 mg/dL    BUN 29 (H) 8 - 23 mg/dL    Creatinine 1.46 (H) 0.76 - 1.27 mg/dL    Sodium 138 136 - 145 mmol/L    Potassium 3.6 3.5 - 5.2 mmol/L    Chloride 102 98 - 107 mmol/L    CO2 27.0 22.0 - 29.0 mmol/L    Calcium 9.0 8.6 - 10.5 mg/dL    Total Protein 7.0 6.0 - 8.5 g/dL    Albumin 3.5 3.5 - 5.2 g/dL    ALT (SGPT) 15 1 - 41 U/L    AST (SGOT) 19 1 - 40 U/L    Alkaline Phosphatase 103 39 - 117 U/L    Total Bilirubin 0.4 0.0 - 1.2 mg/dL    Globulin 3.5 gm/dL    A/G Ratio 1.0 g/dL    BUN/Creatinine Ratio 19.9 7.0 - 25.0    Anion Gap 9.0 5.0 - 15.0 mmol/L    eGFR 49.8 (L) >60.0 mL/min/1.73   Lactic Acid, Plasma    Collection Time: 11/14/23 10:49 PM    Specimen: Blood   Result Value Ref Range    Lactate 1.0 0.5 - 2.0 mmol/L   CBC Auto Differential    Collection Time: 11/14/23 10:49 PM    Specimen: Blood   Result Value Ref Range    WBC 5.86 3.40 - 10.80 10*3/mm3    RBC 3.57 (L) 4.14 - 5.80  10*6/mm3    Hemoglobin 9.9 (L) 13.0 - 17.7 g/dL    Hematocrit 30.4 (L) 37.5 - 51.0 %    MCV 85.2 79.0 - 97.0 fL    MCH 27.7 26.6 - 33.0 pg    MCHC 32.6 31.5 - 35.7 g/dL    RDW 13.8 12.3 - 15.4 %    RDW-SD 42.4 37.0 - 54.0 fl    MPV 8.5 6.0 - 12.0 fL    Platelets 252 140 - 450 10*3/mm3    Neutrophil % 88.8 (H) 42.7 - 76.0 %    Lymphocyte % 5.5 (L) 19.6 - 45.3 %    Monocyte % 4.6 (L) 5.0 - 12.0 %    Eosinophil % 0.3 0.3 - 6.2 %    Basophil % 0.3 0.0 - 1.5 %    Immature Grans % 0.5 0.0 - 0.5 %    Neutrophils, Absolute 5.20 1.70 - 7.00 10*3/mm3    Lymphocytes, Absolute 0.32 (L) 0.70 - 3.10 10*3/mm3    Monocytes, Absolute 0.27 0.10 - 0.90 10*3/mm3    Eosinophils, Absolute 0.02 0.00 - 0.40 10*3/mm3    Basophils, Absolute 0.02 0.00 - 0.20 10*3/mm3    Immature Grans, Absolute 0.03 0.00 - 0.05 10*3/mm3    nRBC 0.0 0.0 - 0.2 /100 WBC   Green Top (Gel)    Collection Time: 11/14/23 10:49 PM   Result Value Ref Range    Extra Tube Hold for add-ons.    Lavender Top    Collection Time: 11/14/23 10:49 PM   Result Value Ref Range    Extra Tube hold for add-on    Gold Top - SST    Collection Time: 11/14/23 10:49 PM   Result Value Ref Range    Extra Tube Hold for add-ons.    Light Blue Top    Collection Time: 11/14/23 10:49 PM   Result Value Ref Range    Extra Tube Hold for add-ons.    Lipase    Collection Time: 11/14/23 10:49 PM    Specimen: Blood   Result Value Ref Range    Lipase 28 13 - 60 U/L   Respiratory Panel PCR w/COVID-19(SARS-CoV-2) TONIE/HOWARD/CARMEN/PAD/COR/ALEXANDER In-House, NP Swab in UTM/VTM, 2 HR TAT - Swab, Nasopharynx    Collection Time: 11/14/23 11:09 PM    Specimen: Nasopharynx; Swab   Result Value Ref Range    ADENOVIRUS, PCR Not Detected Not Detected    Coronavirus 229E Not Detected Not Detected    Coronavirus HKU1 Not Detected Not Detected    Coronavirus NL63 Not Detected Not Detected    Coronavirus OC43 Not Detected Not Detected    COVID19 Not Detected Not Detected - Ref. Range    Human Metapneumovirus Not Detected Not  Detected    Human Rhinovirus/Enterovirus Not Detected Not Detected    Influenza A PCR Not Detected Not Detected    Influenza B PCR Not Detected Not Detected    Parainfluenza Virus 1 Not Detected Not Detected    Parainfluenza Virus 2 Not Detected Not Detected    Parainfluenza Virus 3 Not Detected Not Detected    Parainfluenza Virus 4 Not Detected Not Detected    RSV, PCR Not Detected Not Detected    Bordetella pertussis pcr Not Detected Not Detected    Bordetella parapertussis PCR Not Detected Not Detected    Chlamydophila pneumoniae PCR Not Detected Not Detected    Mycoplasma pneumo by PCR Not Detected Not Detected   Urinalysis With Microscopic If Indicated (No Culture) - Urine, Clean Catch    Collection Time: 11/15/23 12:36 AM    Specimen: Urine, Clean Catch   Result Value Ref Range    Color, UA Yellow Yellow, Straw    Appearance, UA Cloudy (A) Clear    pH, UA 5.5 5.0 - 8.0    Specific Gravity, UA 1.011 1.005 - 1.030    Glucose, UA Negative Negative    Ketones, UA Negative Negative    Bilirubin, UA Negative Negative    Blood, UA Moderate (2+) (A) Negative    Protein, UA Negative Negative    Leuk Esterase, UA Moderate (2+) (A) Negative    Nitrite, UA Negative Negative    Urobilinogen, UA 0.2 E.U./dL 0.2 - 1.0 E.U./dL   Urinalysis, Microscopic Only - Urine, Clean Catch    Collection Time: 11/15/23 12:36 AM    Specimen: Urine, Clean Catch   Result Value Ref Range    RBC, UA None Seen None Seen, 0-2 /HPF    WBC, UA 21-50 (A) None Seen, 0-2 /HPF    Bacteria, UA 4+ (A) None Seen /HPF    Squamous Epithelial Cells, UA 0-2 None Seen, 0-2 /HPF    Hyaline Casts, UA None Seen None Seen /LPF    Methodology Manual Light Microscopy        Ordered the above labs and reviewed the results.        RADIOLOGY  CT Abdomen Pelvis Without Contrast    Result Date: 11/15/2023  CT OF THE ABDOMEN AND PELVIS WITHOUT CONTRAST  HISTORY: Weakness  COMPARISON: July 29, 2023  TECHNIQUE: Axial CT imaging was obtained through the abdomen and pelvis.  No IV contrast was administered.  FINDINGS: The patient is again noted to be status post esophagectomy and gastric pull-up. The configuration appears similar to the prior study. There is scarring identified at the right lung base. There is a chronic loculated right pleural effusion, as well as pleural thickening. Left lung base is clear. No suspicious hepatic lesions are seen. Spleen, pancreas, gallbladder, right adrenal gland, and duodenum appear normal. There is a stable bulky appearance to the left adrenal gland. Punctate nonobstructing stones are noted within both kidneys. Largest stone on the right measures 4 mm. Stone on the left measures 4 mm as well. There is mild to moderate bilateral hydroureteronephrosis. The patient had similar findings on prior CT. No distal ureteral or bladder stones are identified. The urinary bladder is distended, and the appearance may be related to urinary retention. The prostate gland is absent. Patient is again noted to have some wall thickening involving the rectum, which could reflect proctitis. Large volume of stool is noted throughout the colon, suggesting constipation. There is no evidence of small bowel obstruction. There are aortoiliac calcifications. Sacral nerve stimulator is noted. No acute osseous abnormalities are seen. There is no evidence of appendicitis. Right testicle appears to be currently positioned within the right inguinal canal.       1. Chronic postoperative changes noted at the right lung base. 2. Bilateral hydroureteronephrosis, which may be secondary to urinary retention. 3. Wall thickening involving the rectum. Similar findings were present in May 2022 and other prior studies. Appearance may reflect nonspecific proctitis. 4. Large volume of stool throughout the colon, suggesting constipation. Radiation dose reduction techniques were utilized, including automated exposure control and exposure modulation based on body size.   This report was finalized on  11/15/2023 12:32 AM by Dr. Melani Washington M.D on Workstation: [id number]      XR Chest 1 View    Result Date: 11/14/2023  SINGLE VIEW OF THE CHEST  HISTORY: Fever  COMPARISON: None available.  FINDINGS: This patient has a history of esophagectomy and gastric pull-up. The appearance of the mediastinum is similar when compared to the exam from August 5, 2023. There is blunting of the right costophrenic angle. On previous study, this appears to be secondary to chronic effusion. There is scarring noted at the right lung base, although I cannot exclude a superimposed infiltrate. Left lung is clear. No pneumothorax is seen.      Chronic scarring identified at the right lung base. Superimposed infiltrate cannot be excluded.  This report was finalized on 11/14/2023 11:24 PM by Dr. Melani Washington M.D on Workstation: [employer]       Ordered the above noted radiological studies. Reviewed by me in PACS.            PROCEDURES  Procedures              MEDICATIONS GIVEN IN ER  Medications   sodium chloride 0.9 % flush 10 mL (has no administration in time range)   sodium chloride 0.9 % flush 10 mL (has no administration in time range)   sodium chloride 0.9 % flush 10 mL (has no administration in time range)   cefTRIAXone (ROCEPHIN) 2 g in sodium chloride 0.9 % 100 mL IVPB-VTB (2 g Intravenous New Bag 11/15/23 0152)   lactated ringers bolus 1,000 mL (0 mL Intravenous Stopped 11/15/23 0021)   ondansetron (ZOFRAN) injection 4 mg (4 mg Intravenous Given 11/14/23 2308)   acetaminophen (TYLENOL) tablet 1,000 mg (1,000 mg Oral Given 11/14/23 2306)   morphine injection 2 mg (2 mg Intravenous Given 11/15/23 0019)   gabapentin (NEURONTIN) capsule 100 mg (100 mg Oral Given 11/15/23 0212)                   MEDICAL DECISION MAKING, PROGRESS, and CONSULTS    All labs have been independently reviewed by me.  All radiology studies have been reviewed by me and I have also reviewed the radiology report.   EKG's independently viewed and  interpreted by me.  Discussion below represents my analysis of pertinent findings related to patient's condition, differential diagnosis, treatment plan and final disposition.      Additional sources:  - Discussed/ obtained information from independent historians: EMS, spouse at bedside    - External (non-ED) record review: Patient saw his hematologist on 11/9/2023 for follow-up for iron deficiency.  Patient was last admitted here in August 2023 for nausea, vomiting, and abdominal pain.  He was seen by GI and underwent EGD.  Gastric emptying study showed delayed transit time.  He was started on Reglan.  EGD biopsies were consistent with esophageal candidiasis and he was started on fluconazole.    - Chronic or social conditions impacting care: None          Orders placed during this visit:  Orders Placed This Encounter   Procedures    Blood Culture - Blood,    Blood Culture - Blood,    Respiratory Panel PCR w/COVID-19(SARS-CoV-2) TONIE/HOWARD/CARMEN/PAD/COR/ALEXANDER In-House, NP Swab in UTM/VTM, 2 HR TAT - Swab, Nasopharynx    XR Chest 1 View    CT Abdomen Pelvis Without Contrast    Comprehensive Metabolic Panel    Lactic Acid, Plasma    [place] Draw    CBC Auto Differential    Lipase    Urinalysis With Microscopic If Indicated (No Culture) - Urine, Clean Catch    Urinalysis, Microscopic Only - Urine, Clean Catch    Urinalysis With Culture If Indicated -    NPO Diet NPO Type: Strict NPO    Undress & Gown    Continuous Pulse Oximetry    Vital Signs    Undress & Gown    LHA (on-call MD unless specified) Details    Oxygen Therapy- Nasal Cannula; Titrate 1-6 LPM Per SpO2; 90 - 95%    Insert Peripheral IV    Insert Peripheral IV    Insert Peripheral IV    Initiate Observation Status    CBC & Differential    Green Top (Gel)    Lavender Top    Gold Top - SST    Light Blue Top         Additional orders considered but not ordered:  None        Differential diagnosis:    Sepsis, bacteremia, pneumonia, URI, viral syndrome, UTI,  dehydration      Independent interpretation of labs, radiology studies, and discussions with consultants:  ED Course as of 11/15/23 0216   Tue Nov 14, 2023 2233 Temp(!): 102 °F (38.9 °C) [WH]   2233 Heart Rate: 101 [WH]   2233 Resp: 18 [WH]   2233 SpO2: 96 % [WH]   2233 Device (Oxygen Therapy): room air [WH]   2347 Glucose(!): 104 [WH]   2347 BUN(!): 29 [WH]   2347 Creatinine(!): 1.46  1.29 one month ago [WH]   2347 Lactate: 1.0 [WH]   2347 Lipase: 28 [WH]   2347 Chest x-ray personally interpreted by me.  My personal interpretation is: Heart size is normal.  There is possible right pleural effusion.  No pneumothorax    Per the radiologist, there is chronic scarring at the right lung base.  Superimposed infiltrate cannot be excluded. [WH]   2351 WBC: 5.86 [WH]   2351 Hemoglobin(!): 9.9  Stable []   Wed Nov 15, 2023   0008 Respiratory panel is negative [WH]   0048 Per the radiologist, CT abdomen/pelvis shows chronic postoperative changes of the right lung base.  There is bilateral hydroureteronephrosis which may be secondary to urinary retention.  Urinary bladder is distended.  There is wall thickening involving the rectum which is similar to the study done in May 2022.  There is a large volume of stool throughout the colon suggesting constipation.  See dictated report for details. [WH]   0104 Leukocytes, UA(!): Moderate (2+) [WH]   0104 Appearance, UA(!): Cloudy [WH]   0127 WBC, UA(!): 21-50 [WH]   0127 Bacteria, UA(!): 4+ [WH]   0131 Patient has a UTI and will be started on IV Rocephin.  Urine culture has been ordered.  Test results were discussed with the patient.  Shared decision making was discussed and admission was recommended.  He is agreeable with this. [WH]   0212 Case discussed with FAYE Resendiz, and she agrees to admit the patient to Dr. Del Angel.  Pertinent history, exam findings, test results, ED course, and diagnoses were discussed with her. [WH]   0216 MDM: Patient presented to the ED complaining  of chills, generalized weakness, and retching.  Temperature was 102 in triage.  His abdominal exam was benign.  Urinalysis was consistent with UTI.  Labs are otherwise unremarkable.  Chest x-ray did not show any evidence of pneumonia.  CT abdomen/pelvis showed distended bladder and bilateral hydronephrosis but no obstructive uropathy.  Patient was given IV fluids and IV antibiotics.  Urine culture is pending.  Case was discussed with the hospitalist and he will be admitted. [WH]      ED Course User Index  [WH] Jose Gomez MD               DIAGNOSIS  Final diagnoses:   Acute UTI   Fever, unspecified fever cause   Chronic anemia   Chronic renal impairment, unspecified CKD stage   Anticoagulated         DISPOSITION  ADMISSION    Discussed treatment plan and reason for admission with pt/family and admitting physician.  Pt/family voiced understanding of the plan for admission for further testing/treatment as needed.               Latest Documented Vital Signs:  As of 02:16 EST  BP- 113/52 HR- 101 Temp- 99.5 °F (37.5 °C) (Oral) O2 sat- 96%              --    Please note that portions of this were completed with a voice recognition program.       Note Disclaimer: At [facility], we believe that sharing information builds trust and better relationships. You are receiving this note because you are receiving care at [facility] or recently visited. It is possible you will see health information before a provider has talked with you about it. This kind of information can be easy to misunderstand. To help you fully understand what it means for your health, we urge you to discuss this note with your provider.             Jose Gomez MD  11/15/23 0216

## 2023-11-15 NOTE — ED NOTES
"Nursing report ED to floor  Jose Hurtado  73 y.o.  male    HPI :   Chief Complaint   Patient presents with    Abdominal Pain    Nausea       Admitting doctor:   Albert Del Angel MD    Admitting diagnosis:   The primary encounter diagnosis was Acute UTI. Diagnoses of Fever, unspecified fever cause, Chronic anemia, Chronic renal impairment, unspecified CKD stage, and Anticoagulated were also pertinent to this visit.    Code status:   Current Code Status       Date Active Code Status Order ID Comments User Context       Prior            Allergies:   Sulfamethoxazole-trimethoprim    Isolation:   No active isolations    Intake and Output    Intake/Output Summary (Last 24 hours) at 11/15/2023 0223  Last data filed at 11/15/2023 0021  Gross per 24 hour   Intake 1000 ml   Output --   Net 1000 ml       Weight:       11/14/23 2227   Weight: 77.1 kg (170 lb)       Most recent vitals:   Vitals:    11/14/23 2227 11/15/23 0021 11/15/23 0215   BP: 113/52  105/56   BP Location: Right arm     Patient Position: Lying     Pulse: 101  79   Resp: 18     Temp: (!) 102 °F (38.9 °C) 99.5 °F (37.5 °C)    TempSrc: Tympanic Oral    SpO2: 96%  96%   Weight: 77.1 kg (170 lb)     Height: 195.6 cm (77\")         Active LDAs/IV Access:   Lines, Drains & Airways       Active LDAs       Name Placement date Placement time Site Days    Peripheral IV 11/14/23 2249 Left Antecubital 11/14/23 2249  Antecubital  less than 1    Urethral Catheter Coude 14 Fr. 08/04/23  0905  -- 102                    Labs (abnormal labs have a star):   Labs Reviewed   COMPREHENSIVE METABOLIC PANEL - Abnormal; Notable for the following components:       Result Value    Glucose 104 (*)     BUN 29 (*)     Creatinine 1.46 (*)     eGFR 49.8 (*)     All other components within normal limits    Narrative:     GFR Normal >60  Chronic Kidney Disease <60  Kidney Failure <15    The GFR formula is only valid for adults with stable renal function between ages 18 and 70.   CBC WITH " AUTO DIFFERENTIAL - Abnormal; Notable for the following components:    RBC 3.57 (*)     Hemoglobin 9.9 (*)     Hematocrit 30.4 (*)     Neutrophil % 88.8 (*)     Lymphocyte % 5.5 (*)     Monocyte % 4.6 (*)     Lymphocytes, Absolute 0.32 (*)     All other components within normal limits   URINALYSIS W/ MICROSCOPIC IF INDICATED (NO CULTURE) - Abnormal; Notable for the following components:    Appearance, UA Cloudy (*)     Blood, UA Moderate (2+) (*)     Leuk Esterase, UA Moderate (2+) (*)     All other components within normal limits   URINALYSIS, MICROSCOPIC ONLY - Abnormal; Notable for the following components:    WBC, UA 21-50 (*)     Bacteria, UA 4+ (*)     All other components within normal limits   RESPIRATORY PANEL PCR W/ COVID-19 (SARS-COV-2), NP SWAB IN UTM/VTP, 2 HR TAT - Normal    Narrative:     In the setting of a positive respiratory panel with a viral infection PLUS a negative procalcitonin without other underlying concern for bacterial infection, consider observing off antibiotics or discontinuation of antibiotics and continue supportive care. If the respiratory panel is positive for atypical bacterial infection (Bordetella pertussis, Chlamydophila pneumoniae, or Mycoplasma pneumoniae), consider antibiotic de-escalation to target atypical bacterial infection.   LACTIC ACID, PLASMA - Normal   LIPASE - Normal   BLOOD CULTURE   BLOOD CULTURE   RAINBOW DRAW    Narrative:     The following orders were created for panel order [place] Draw.  Procedure                               Abnormality         Status                     ---------                               -----------         ------                     Green Top (Gel)[[id number]]                                  Final result               Lavender Top[[id number]]                                     Final result               Gold Top - SST[[id number]]                                   Final result               Light Blue Top[[id number]]                                    Final result                 Please view results for these tests on the individual orders.   URINALYSIS W/ CULTURE IF INDICATED   CBC AND DIFFERENTIAL    Narrative:     The following orders were created for panel order CBC & Differential.  Procedure                               Abnormality         Status                     ---------                               -----------         ------                     CBC Auto Differential[[id number]]        Abnormal            Final result                 Please view results for these tests on the individual orders.   GREEN TOP   LAVENDER TOP   GOLD TOP - SST   LIGHT BLUE TOP       EKG:   No orders to display       Meds given in ED:   Medications   sodium chloride 0.9 % flush 10 mL (has no administration in time range)   sodium chloride 0.9 % flush 10 mL (has no administration in time range)   sodium chloride 0.9 % flush 10 mL (has no administration in time range)   lactated ringers bolus 1,000 mL (0 mL Intravenous Stopped 11/15/23 0021)   ondansetron (ZOFRAN) injection 4 mg (4 mg Intravenous Given 11/14/23 2308)   acetaminophen (TYLENOL) tablet 1,000 mg (1,000 mg Oral Given 11/14/23 2306)   morphine injection 2 mg (2 mg Intravenous Given 11/15/23 0019)   cefTRIAXone (ROCEPHIN) 2 g in sodium chloride 0.9 % 100 mL IVPB-VTB (2 g Intravenous New Bag 11/15/23 0152)   gabapentin (NEURONTIN) capsule 100 mg (100 mg Oral Given 11/15/23 0212)       Imaging results:  CT Abdomen Pelvis Without Contrast    Result Date: 11/15/2023   1. Chronic postoperative changes noted at the right lung base. 2. Bilateral hydroureteronephrosis, which may be secondary to urinary retention. 3. Wall thickening involving the rectum. Similar findings were present in May 2022 and other prior studies. Appearance may reflect nonspecific proctitis. 4. Large volume of stool throughout the colon, suggesting constipation. Radiation dose reduction techniques were utilized, including automated  "exposure control and exposure modulation based on body size.   This report was finalized on 11/15/2023 12:32 AM by Dr. Melani Washington M.D on Workstation: [employer]      XR Chest 1 View    Result Date: 2023  Chronic scarring identified at the right lung base. Superimposed infiltrate cannot be excluded.  This report was finalized on 2023 11:24 PM by Dr. Melani Washington M.D on Workstation: [id number]       Ambulatory status:   - Ax1    Social issues:   Social History     Socioeconomic History    Marital status:      Spouse name: Lena    Number of children: 0    Years of education: College   Tobacco Use    Smoking status: Former     Packs/day: 2.00     Years: 3.00     Additional pack years: 0.00     Total pack years: 6.00     Types: Cigarettes     Quit date:      Years since quittin.9    Smokeless tobacco: Former     Types: Chew    Tobacco comments:     Caffeine - coffee and soda    Vaping Use    Vaping Use: Never used   Substance and Sexual Activity    Alcohol use: Yes     Alcohol/week: 3.0 standard drinks of alcohol     Types: 1 Glasses of wine, 1 Cans of beer, 1 Shots of liquor per week     Comment: 2    Drug use: Never     Comment: hx marijuana use \"a long time ago\"    Sexual activity: Defer     Partners: Male       NIH Stroke Scale:       Chasidy Foreman RN  11/15/23 02:23 EST     Call Chasidy #[phone] with any questions  "

## 2023-11-15 NOTE — CASE MANAGEMENT/SOCIAL WORK
Discharge Planning Assessment  [facility]     Patient Name: Jose Hurtado  MRN: [id number]  Today's Date: 11/15/2023    Admit Date: 11/14/2023    Plan: Home w/o needs, [facility] if needed   Discharge Needs Assessment       Row Name 11/15/23 1351       Living Environment    People in Home spouse    Name(s) of People in Home wife: Lena    Current Living Arrangements home    Potentially Unsafe Housing Conditions none    Primary Care Provided by self    Provides Primary Care For no one    Family Caregiver if Needed spouse    Family Caregiver Names wife: Lena    Quality of Family Relationships supportive    Able to Return to Prior Arrangements yes       Resource/Environmental Concerns    Resource/Environmental Concerns none    Transportation Concerns none       Transition Planning    Patient/Family Anticipates Transition to home with family    Patient/Family Anticipated Services at Transition none    Transportation Anticipated family or friend will provide       Discharge Needs Assessment    Readmission Within the Last 30 Days no previous admission in last 30 days    Equipment Currently Used at Home cpap;cane, straight;walker, rolling    Concerns to be Addressed adjustment to diagnosis/illness;basic needs;discharge planning    Anticipated Changes Related to Illness none    Equipment Needed After Discharge none                   Discharge Plan       Row Name 11/15/23 3249       Plan    Plan Home w/o needs, [facility] if needed    Patient/Family in Agreement with Plan yes    Provided Post Acute Provider List? Refused    Refused Provider List Comment per pt's wife, do not anticipate the need for SNF / HH    Plan Comments I met with pt and his wife Lena, both provided information. Pt and his wife live in a single level home with one small step to enter, pt is IADL, can afford his Rx and uses [facility], ha a cane, walker and CPAP that he uses PRN.  Lena said she provides the  transportation for her , she said if any cooking is done he does it, Pt has uses [facility] in the past and if HH needed would want to use them again, pt has been to [facility] for rehab.  Plan at discharge is to return home and no discharge needs anticipated by pt or his wife.      Ashlee Sweeney RN                  Continued Care and Services - Admitted Since 11/14/2023    Coordination has not been started for this encounter.          Demographic Summary       Row Name 11/15/23 1355       General Information    Admission Type observation    Arrived From home    Required Notices Provided Observation Status Notice    Referral Source admission list    Reason for Consult discharge planning    Preferred Language English       Contact Information    Permission Granted to Share Info With family/designee                   Functional Status       Row Name 11/15/23 2796       Functional Status, IADL    Medications independent    Meal Preparation assistive person    Housekeeping assistive person    Laundry assistive person    Shopping assistive person    IADL Comments pt does not drive             Ashlee Sweeney, RN

## 2023-11-16 PROBLEM — A49.8 INFECTION DUE TO ESBL-PRODUCING ESCHERICHIA COLI: Status: ACTIVE | Noted: 2021-05-11

## 2023-11-16 PROBLEM — B96.20 BACTEREMIA DUE TO ESCHERICHIA COLI: Status: ACTIVE | Noted: 2023-11-16

## 2023-11-16 PROBLEM — R78.81 GRAM-NEGATIVE BACTEREMIA: Status: ACTIVE | Noted: 2023-11-16

## 2023-11-16 PROBLEM — E44.0 MODERATE MALNUTRITION: Status: ACTIVE | Noted: 2023-11-16

## 2023-11-16 LAB
ANION GAP SERPL CALCULATED.3IONS-SCNC: 4.3 MMOL/L (ref 5–15)
BACTERIA BLD CULT: ABNORMAL
BACTERIA ID TEST ISLT QL CULT: ABNORMAL
BOTTLE TYPE: ABNORMAL
BUN SERPL-MCNC: 21 MG/DL (ref 8–23)
BUN/CREAT SERPL: 18.3 (ref 7–25)
CALCIUM SPEC-SCNC: 8.3 MG/DL (ref 8.6–10.5)
CHLORIDE SERPL-SCNC: 104 MMOL/L (ref 98–107)
CO2 SERPL-SCNC: 26.7 MMOL/L (ref 22–29)
CREAT SERPL-MCNC: 1.15 MG/DL (ref 0.76–1.27)
DEPRECATED RDW RBC AUTO: 43.1 FL (ref 37–54)
EGFRCR SERPLBLD CKD-EPI 2021: 66.4 ML/MIN/1.73
ERYTHROCYTE [DISTWIDTH] IN BLOOD BY AUTOMATED COUNT: 13.7 % (ref 12.3–15.4)
FOLATE SERPL-MCNC: 9.61 NG/ML (ref 4.78–24.2)
GLUCOSE SERPL-MCNC: 124 MG/DL (ref 65–99)
HCT VFR BLD AUTO: 25.3 % (ref 37.5–51)
HGB BLD-MCNC: 8.4 G/DL (ref 13–17.7)
MCH RBC QN AUTO: 28.5 PG (ref 26.6–33)
MCHC RBC AUTO-ENTMCNC: 33.2 G/DL (ref 31.5–35.7)
MCV RBC AUTO: 85.8 FL (ref 79–97)
PLATELET # BLD AUTO: 199 10*3/MM3 (ref 140–450)
PMV BLD AUTO: 9 FL (ref 6–12)
POTASSIUM SERPL-SCNC: 3.9 MMOL/L (ref 3.5–5.2)
RBC # BLD AUTO: 2.95 10*6/MM3 (ref 4.14–5.8)
SODIUM SERPL-SCNC: 135 MMOL/L (ref 136–145)
VIT B12 BLD-MCNC: 355 PG/ML (ref 211–946)
WBC NRBC COR # BLD: 5.73 10*3/MM3 (ref 3.4–10.8)

## 2023-11-16 PROCEDURE — 80048 BASIC METABOLIC PNL TOTAL CA: CPT | Performed by: INTERNAL MEDICINE

## 2023-11-16 PROCEDURE — 25010000002 CEFTRIAXONE PER 250 MG: Performed by: NURSE PRACTITIONER

## 2023-11-16 PROCEDURE — 82746 ASSAY OF FOLIC ACID SERUM: CPT | Performed by: INTERNAL MEDICINE

## 2023-11-16 PROCEDURE — 82607 VITAMIN B-12: CPT | Performed by: INTERNAL MEDICINE

## 2023-11-16 PROCEDURE — 85027 COMPLETE CBC AUTOMATED: CPT | Performed by: INTERNAL MEDICINE

## 2023-11-16 PROCEDURE — 25010000002 ERTAPENEM PER 500 MG: Performed by: INTERNAL MEDICINE

## 2023-11-16 PROCEDURE — 99223 1ST HOSP IP/OBS HIGH 75: CPT | Performed by: STUDENT IN AN ORGANIZED HEALTH CARE EDUCATION/TRAINING PROGRAM

## 2023-11-16 PROCEDURE — 87040 BLOOD CULTURE FOR BACTERIA: CPT | Performed by: STUDENT IN AN ORGANIZED HEALTH CARE EDUCATION/TRAINING PROGRAM

## 2023-11-16 PROCEDURE — 97116 GAIT TRAINING THERAPY: CPT

## 2023-11-16 PROCEDURE — 97161 PT EVAL LOW COMPLEX 20 MIN: CPT

## 2023-11-16 RX ORDER — UREA 10 %
1000 LOTION (ML) TOPICAL DAILY
Status: DISCONTINUED | OUTPATIENT
Start: 2023-11-16 | End: 2023-11-17 | Stop reason: HOSPADM

## 2023-11-16 RX ADMIN — APIXABAN 5 MG: 5 TABLET, FILM COATED ORAL at 08:01

## 2023-11-16 RX ADMIN — Medication 10 ML: at 08:02

## 2023-11-16 RX ADMIN — METOCLOPRAMIDE 5 MG: 5 TABLET ORAL at 12:03

## 2023-11-16 RX ADMIN — Medication 10 ML: at 20:49

## 2023-11-16 RX ADMIN — ERTAPENEM SODIUM 1000 MG: 1 INJECTION, POWDER, LYOPHILIZED, FOR SOLUTION INTRAMUSCULAR; INTRAVENOUS at 12:04

## 2023-11-16 RX ADMIN — BISACODYL 5 MG: 5 TABLET, COATED ORAL at 20:59

## 2023-11-16 RX ADMIN — GABAPENTIN 100 MG: 100 CAPSULE ORAL at 20:59

## 2023-11-16 RX ADMIN — Medication 1000 MCG: at 15:00

## 2023-11-16 RX ADMIN — Medication 1 MG: at 20:59

## 2023-11-16 RX ADMIN — TAMSULOSIN HYDROCHLORIDE 0.4 MG: 0.4 CAPSULE ORAL at 08:01

## 2023-11-16 RX ADMIN — PAROXETINE HYDROCHLORIDE HEMIHYDRATE 40 MG: 20 TABLET, FILM COATED ORAL at 06:11

## 2023-11-16 RX ADMIN — METOCLOPRAMIDE 5 MG: 5 TABLET ORAL at 17:09

## 2023-11-16 RX ADMIN — PANTOPRAZOLE SODIUM 40 MG: 40 TABLET, DELAYED RELEASE ORAL at 06:11

## 2023-11-16 RX ADMIN — PRAMIPEXOLE DIHYDROCHLORIDE 1.5 MG: 1.5 TABLET ORAL at 12:03

## 2023-11-16 RX ADMIN — ATORVASTATIN CALCIUM 40 MG: 20 TABLET, FILM COATED ORAL at 20:59

## 2023-11-16 RX ADMIN — GABAPENTIN 100 MG: 100 CAPSULE ORAL at 08:01

## 2023-11-16 RX ADMIN — APIXABAN 5 MG: 5 TABLET, FILM COATED ORAL at 20:58

## 2023-11-16 RX ADMIN — GABAPENTIN 100 MG: 100 CAPSULE ORAL at 15:00

## 2023-11-16 RX ADMIN — PRAMIPEXOLE DIHYDROCHLORIDE 1.5 MG: 1.5 TABLET ORAL at 20:59

## 2023-11-16 RX ADMIN — POLYETHYLENE GLYCOL 3350 17 G: 17 POWDER, FOR SOLUTION ORAL at 08:01

## 2023-11-16 RX ADMIN — CEFTRIAXONE SODIUM 1000 MG: 1 INJECTION, POWDER, FOR SOLUTION INTRAMUSCULAR; INTRAVENOUS at 01:35

## 2023-11-16 RX ADMIN — SENNOSIDES AND DOCUSATE SODIUM 2 TABLET: 50; 8.6 TABLET ORAL at 08:01

## 2023-11-16 RX ADMIN — SENNOSIDES AND DOCUSATE SODIUM 2 TABLET: 50; 8.6 TABLET ORAL at 20:59

## 2023-11-16 RX ADMIN — POTASSIUM CHLORIDE 10 MEQ: 750 TABLET, EXTENDED RELEASE ORAL at 08:01

## 2023-11-16 NOTE — PLAN OF CARE
Goal Outcome Evaluation:   Patient transferred to unit today from observation unit with a UTI. Aox4. VSS on RA. Ambulates assist x1. King catheter in place. [place], deaf in left ear. Healing wound on left big toe. No complaints of pain. Will continue to monitor.

## 2023-11-16 NOTE — THERAPY EVALUATION
Patient Name: Jose Hurtado  : 1950    MRN: [id number]                              Today's Date: 2023       Admit Date: 2023    Visit Dx:     ICD-10-CM ICD-9-CM   1. Acute UTI  N39.0 599.0   2. Fever, unspecified fever cause  R50.9 780.60   3. Chronic anemia  D64.9 285.9   4. Chronic renal impairment, unspecified CKD stage  N18.9 585.9   5. Anticoagulated  Z79.01 V58.61     Patient Active Problem List   Diagnosis    History of esophageal cancer    Adenomatous polyp of colon    Malignant neoplasm of prostate    RLS (restless legs syndrome)    History of DVT (deep vein thrombosis)    Recurrent major depressive disorder, in partial remission    Hypertension    Anemia    Insomnia due to other mental disorder    Peripheral polyneuropathy    B12 deficiency    Lymphedema    Iron deficiency    Gastroparesis    History of recurrent deep vein thrombosis (DVT)    Tremor of both hands    Gastrointestinal hemorrhage    Acute blood loss anemia    Pancytopenia    Chronic venous hypertension due to DVT    Bleeding hemorrhoid    Malabsorption of iron    Iron deficiency anemia    History of esophageal cancer    Abnormal CT scan    Generalized weakness    Chronic anticoagulation    CKD (chronic kidney disease) stage 2, GFR 60-89 ml/min    NSTEMI (non-ST elevated myocardial infarction)    Bronchitis    Dyspnea    Elevated troponin    Atrial fibrillation    Chronic obstructive pulmonary disease    Congestive heart failure    Gait instability    Dark stools    Proctitis    Acute urinary retention    Severe malnutrition    Other symptoms and signs concerning food and fluid intake    Weight loss    Dehydration    Acute UTI    ALEC (acute kidney injury)    Sepsis    Bilateral hydronephrosis    Scarring of lung    Wound of left great toe    Gram-negative bacteremia     Past Medical History:   Diagnosis Date    Acute deep vein thrombosis (DVT) of popliteal vein of left lower extremity 2020    Anemia      Anti-phospholipid syndrome     On lifelong anticoagulation therapy    Bladder disorder     LEAKAGE   ON MED  wers pads    Bleeding hemorrhoids     Chronic kidney disease     Community acquired pneumonia     HISTORY OF IN 2014    Congestive heart failure     COPD (chronic obstructive pulmonary disease)     Deep venous thrombosis 2006, 2008    Left lower extremity multiple    Depression     Esophageal carcinoma 12/31/2014    had chemo and radiation prior surgery    Gastroparesis     Hemorrhoids     HH (hiatus hernia)     History of atrial fibrillation 2015    ONE EPISODE WHILE HOSPITALIZED    History of kidney stones     History of nephrolithiasis     History of pancreatitis     PT STATES MANY YEARS AGO    History of radiation therapy     History of transfusion     Hypertension     Long-term (current) use of anticoagulants, INR goal 2.0-3.0     Lymphedema     Malignant neoplasm of prostate     Other hyperlipidemia 01/30/2018 January 30, 2018 lipid panel risk 12.8%    Restless legs syndrome     Sleep apnea     OCCASIONALLY WEARS CPAP    Squamous carcinoma     on the head     Past Surgical History:   Procedure Laterality Date    APPENDECTOMY  1950    BRONCHOSCOPY      (Diagnostic)    CARDIAC CATHETERIZATION N/A 05/14/2022    Procedure: Left Heart Cath;  Surgeon: Eric So MD;  Location:  [facility];  Service: Cardiology;  Laterality: N/A;    CARDIAC CATHETERIZATION N/A 05/14/2022    Procedure: Coronary angiography;  Surgeon: Eric So MD;  Location:  [facility];  Service: Cardiology;  Laterality: N/A;    CARDIAC CATHETERIZATION N/A 05/14/2022    Procedure: Left ventriculography;  Surgeon: Eric So MD;  Location:  [facility];  Service: Cardiology;  Laterality: N/A;    CATARACT EXTRACTION Bilateral 2014    COLONOSCOPY  12/15/2014    Complete / Description: EH, IH, torts, stool, follow-up colonoscopy due in 5 years.     COLONOSCOPY N/A 06/13/2017    non-thrombosed external hemorrhoids, normal examined ileum, IH    COLONOSCOPY N/A 02/26/2019    Procedure: COLONOSCOPY with Cold Polypectomy;  Surgeon: Mulugeta Millan MD;  Location: [facility] ENDOSCOPY;  Service: Gastroenterology    COLONOSCOPY N/A 12/16/2020    Procedure: COLONOSCOPY to cecum into TI;  Surgeon: Mesha Sanchez MD;  Location: [facility] ENDOSCOPY;  Service: Gastroenterology;  Laterality: N/A;  pre: lower GI bleed  post: hemorrhoids     CYSTOSCOPY W/ LASER LITHOTRIPSY      ENDOSCOPY N/A 06/13/2017    Procedure: ESOPHAGOGASTRODUODENOSCOPY WITH COLD BIOPSY;  Surgeon: Lake Gonzalez MD;  Location: [facility] ENDOSCOPY;  Service:     ENDOSCOPY N/A 11/18/2021    Procedure: ESOPHAGOGASTRODUODENOSCOPY WITH  DILATATION WITH FLUOROSCOPY;  Surgeon: Jose Christine III, MD;  Location: [facility] ENDOSCOPY;  Service: Gastroenterology;  Laterality: N/A;  Pre: dysphagia, h/x of adinocarcinoma of esophagus  Post: same    ENDOSCOPY N/A 08/07/2023    Procedure: ESOPHAGOGASTRODUODENOSCOPY;  Surgeon: Jameel Valencia MD;  Location: [facility] ENDOSCOPY;  Service: Gastroenterology;  Laterality: N/A;  PRE- DYSPHAGIA  POST-ABORTED DUE TO RETAINED FOOD    ENDOSCOPY N/A 08/08/2023    Procedure: ESOPHAGOGASTRODUODENOSCOPY with bx;  Surgeon: Jameel Valencia MD;  Location: [facility] ENDOSCOPY;  Service: Gastroenterology;  Laterality: N/A;  pre: N/V, abd pain  post: esophageal nodule, retained food    ESOPHAGECTOMY      April 2015, stage IIB esophageal carcinoma, sub-total resection.    ESOPHAGECTOMY      Esophagectomy Subtotal [place] Procedure    EXCISION LESION  08/2012    Removal of Squamous Cell CA on Head    HAMMER TOE REPAIR  09/2014    Hammertoe Operation (Each Toe), 10/2014    HAMMER TOE REPAIR Left 10/03/2017    Procedure: Left second third and fourth distal interphalangeal joint resection with flexor tenotomy;  Surgeon: Mulugeta Lira MD;  Location: [facility] OR [facility];  Service:      HEMORRHOIDECTOMY N/A 12/23/2020    Procedure: HEMORRHOIDECTOMY;  Surgeon: Billy Julio Jr., MD;  Location: [facility] MAIN OR;  Service: General;  Laterality: N/A;    HERNIA REPAIR      incisional    JEJUNOSTOMY      Laparoscopic    JEJUNOSTOMY      tube removal     KNEE SURGERY Bilateral 1967, 1973, 1981    PATELLA SURGERY Left     removed    PILONIDAL CYST / SINUS EXCISION      NH ARTHRP KNE CONDYLE&PLATU MEDIAL&LAT COMPARTMENTS Right 03/26/2018    Procedure: TOTAL KNEE ARTHROPLASTY;  Surgeon: Renny Solis MD;  Location: [facility] MAIN OR;  Service: Orthopedics    PROSTATECTOMY  2010    PYLOROPLASTY      SIGMOIDOSCOPY N/A 08/02/2023    Procedure: SIGMOIDOSCOPY FLEXIBLE;  Surgeon: Mesha Sanchez MD;  Location: [facility] ENDOSCOPY;  Service: Gastroenterology;  Laterality: N/A;  PRE- ABNORMAL CT  POST- HEMORRHOIDS, ULCERATION    SPINAL FUSION  02/1998    C 5,6    TONSILLECTOMY      UPPER GASTROINTESTINAL ENDOSCOPY  12/15/2014    LA Grade D esophagitis, Pardo's, HH, multiple duodenal ulcers    VENTRAL/INCISIONAL HERNIA REPAIR N/A 04/14/2016    Procedure: VENTRAL/INCISIONAL HERNIA REPAIR, open, with mesh, and component separation;  Surgeon: Darren Rivas MD;  Location: [facility] MAIN OR;  Service:       General Information       Row Name 11/16/23 0863          Physical Therapy Time and Intention    Document Type evaluation (P)   -BM     Mode of Treatment individual therapy;physical therapy (P)   -BM       Row Name 11/16/23 0875          General Information    Patient Profile Reviewed yes (P)   -BM     Prior Level of Function independent: (P)   -BM     Existing Precautions/Restrictions fall (P)   -BM     Barriers to Rehab none identified (P)   -BM       Row Name 11/16/23 0883          Living Environment    People in Home spouse (P)   -BM       Row Name 11/16/23 0864          Home Main Entrance    Number of Stairs, Main Entrance one (P)   -BM     Stair Railings, Main Entrance none (P)   -BM       Row Name  11/16/23 0851          Stairs Within Home, Primary    Number of Stairs, Within Home, Primary none (P)   -BM       Row Name 11/16/23 0851          Cognition    Orientation Status (Cognition) oriented x 4 (P)   -BM       Row Name 11/16/23 0851          Safety Issues, Functional Mobility    Impairments Affecting Function (Mobility) balance;endurance/activity tolerance;range of motion (ROM);strength (P)   -BM     Comment, Safety Issues/Impairments (Mobility) non skid socks and gait belt donned (P)   -BM               User Key  (r) = Recorded By, (t) = Taken By, (c) = Cosigned By      Initials Name Provider Type    BM Aayush Coppola, PT Student PT Student                   Mobility       Row Name 11/16/23 0852          Bed Mobility    Bed Mobility supine-sit;sit-supine (P)   -BM     Supine-Sit [place] (Bed Mobility) standby assist (P)   -BM     Sit-Supine [place] (Bed Mobility) standby assist (P)   -BM     Assistive Device (Bed Mobility) head of bed elevated (P)   -BM       Row Name 11/16/23 0852          Sit-Stand Transfer    Sit-Stand [place] (Transfers) contact guard (P)   -BM     Assistive Device (Sit-Stand Transfers) walker, front-wheeled (P)   -BM     Comment, (Sit-Stand Transfer) increased time to complete transfer (P)   -BM       Row Name 11/16/23 0852          Gait/Stairs (Locomotion)    [place] Level (Gait) standby assist (P)   -BM     Assistive Device (Gait) walker, front-wheeled (P)   -BM     Patient was able to Ambulate yes (P)   -BM     Distance in Feet (Gait) 200' (P)   -BM     Deviations/Abnormal Patterns (Gait) jf decreased;gait speed decreased (P)   -BM     Bilateral Gait Deviations forward flexed posture (P)   -BM     Left Sided Gait Deviations decreased knee extension (P)   -BM     Right Sided Gait Deviations decreased knee extension (P)   -BM     Comment, (Gait/Stairs) cues for postural adjustment (P)   -BM               User Key  (r) = Recorded By, (t) = Taken By, (c) =  Cosigned By      Initials Name Provider Type     Aayush Coppola, PT Student PT Student                   Obj/Interventions       Row Name 11/16/23 0854          Range of Motion Comprehensive    General Range of Motion bilateral lower extremity ROM WFL (P)   -       Row Name 11/16/23 0854          Strength Comprehensive (MMT)    Comment, General Manual Muscle Testing (MMT) Assessment BLE strength grossly 4/5 (P)   -       Row Name 11/16/23 0854          Motor Skills    Therapeutic Exercise hip;knee;ankle (P)   B AP, LAQ, seated marches x10  -BM               User Key  (r) = Recorded By, (t) = Taken By, (c) = Cosigned By      Initials Name Provider Type     Aayush Coppola, PT Student PT Student                   Goals/Plan       Row Name 11/16/23 0900          Bed Mobility Goal 1 (PT)    Activity/Assistive Device (Bed Mobility Goal 1, PT) bed mobility activities, all (P)   -BM     [place] Level/Cues Needed (Bed Mobility Goal 1, PT) independent (P)   -BM     Time Frame (Bed Mobility Goal 1, PT) 1 week (P)   -BM     Progress/Outcomes (Bed Mobility Goal 1, PT) goal ongoing (P)   -BM       Row Name 11/16/23 0900          Transfer Goal 1 (PT)    Activity/Assistive Device (Transfer Goal 1, PT) sit-to-stand/stand-to-sit (P)   -BM     [place] Level/Cues Needed (Transfer Goal 1, PT) modified independence (P)   -BM     Time Frame (Transfer Goal 1, PT) 1 week (P)   -BM     Progress/Outcome (Transfer Goal 1, PT) goal ongoing (P)   -BM       Row Name 11/16/23 0900          Gait Training Goal 1 (PT)    Activity/Assistive Device (Gait Training Goal 1, PT) gait (walking locomotion);assistive device use (P)   -BM     [place] Level (Gait Training Goal 1, PT) modified independence (P)   -BM     Distance (Gait Training Goal 1, PT) 250' (P)   -BM     Time Frame (Gait Training Goal 1, PT) 1 week (P)   -BM     Progress/Outcome (Gait Training Goal 1, PT) goal ongoing (P)   -BM       Row Name 11/16/23 0900           Therapy Assessment/Plan (PT)    Planned Therapy Interventions (PT) balance training;bed mobility training;gait training;postural re-education;strengthening;transfer training (P)   -BM               User Key  (r) = Recorded By, (t) = Taken By, (c) = Cosigned By      Initials Name Provider Type    Aayush Vinson, PT Student PT Student                   Clinical Impression       Row Name 11/16/23 0855          Pain    Pretreatment Pain Rating 0/10 - no pain (P)   -BM     Posttreatment Pain Rating 0/10 - no pain (P)   -BM       Row Name 11/16/23 0855          Plan of Care Review    Progress no change (P)   -BM     Outcome Evaluation Pt is a 74 y/o male presenting with an acute UTI and general weakness. Prior to admission pt was independent with all mobility using a straight cane or rwx. Pt completed bed mobility with SBA, sit to stand with CGA and ambulated 200' with rwx and SBA. Cues were given for postural adjustment due to his forward flexed posture while ambulating. Pt could maintain an upright posture briefly but returned back to the flexed posture. Pt still demonstrates generalized weakness and would continue to benefit from PT to increase strength, independence and return to PLOF. (P)   -BM       Row Name 11/16/23 0822          Therapy Assessment/Plan (PT)    Rehab Potential (PT) good, to achieve stated therapy goals (P)   -BM     Criteria for Skilled Interventions Met (PT) yes (P)   -BM     Therapy Frequency (PT) 5 times/wk (P)   -BM       Row Name 11/16/23 0833          Positioning and Restraints    Pre-Treatment Position in bed (P)   -BM     Post Treatment Position bed (P)   -BM     In Bed notified nsg;fowlers;call light within reach;encouraged to call for assist;exit alarm on (P)   -BM               User Key  (r) = Recorded By, (t) = Taken By, (c) = Cosigned By      Initials Name Provider Type    Aayush Vinson, PT Student PT Student                   Outcome Measures       Row Name 11/16/23 0953           How much help from another person do you currently need...    Turning from your back to your side while in flat bed without using bedrails? 4 (P)   -BM     Moving from lying on back to sitting on the side of a flat bed without bedrails? 4 (P)   -BM     Moving to and from a bed to a chair (including a wheelchair)? 3 (P)   -BM     Standing up from a chair using your arms (e.g., wheelchair, bedside chair)? 3 (P)   -BM     Climbing 3-5 steps with a railing? 2 (P)   -BM     To walk in hospital room? 3 (P)   -BM     AM-PAC 6 Clicks Score (PT) 19 (P)   -BM     Highest Level of Mobility Goal 6 --> Walk 10 steps or more (P)   -BM               User Key  (r) = Recorded By, (t) = Taken By, (c) = Cosigned By      Initials Name Provider Type     Aayush Coppola, PT Student PT Student                                 Physical Therapy Education       Title: PT OT SLP Therapies (Done)       Topic: Physical Therapy (Done)       Point: Mobility training (Done)       Learning Progress Summary             Patient Acceptance, E,TB, VU,NR by  at 11/16/2023 0902                         Point: Home exercise program (Done)       Learning Progress Summary             Patient Acceptance, E,TB, VU,NR by  at 11/16/2023 0902                         Point: Body mechanics (Done)       Learning Progress Summary             Patient Acceptance, E,TB, VU,NR by  at 11/16/2023 0902                         Point: Precautions (Done)       Learning Progress Summary             Patient Acceptance, E,TB, VU,NR by  at 11/16/2023 0902                                         User Key       Initials Effective Dates Name Provider Type Discipline     10/12/23 -  Aayush Coppola, PT Student PT Student PT                  PT Recommendation and Plan  Planned Therapy Interventions (PT): (P) balance training, bed mobility training, gait training, postural re-education, strengthening, transfer training  Progress: (P) no change  Outcome Evaluation: (P) Pt is a Ste[place]  y/o male presenting with an acute UTI and general weakness. Prior to admission pt was independent with all mobility using a straight cane or rwx. Pt completed bed mobility with SBA, sit to stand with CGA and ambulated 200' with rwx and SBA. Cues were given for postural adjustment due to his forward flexed posture while ambulating. Pt could maintain an upright posture briefly but returned back to the flexed posture. Pt still demonstrates generalized weakness and would continue to benefit from PT to increase strength, independence and return to PLOF.     Time Calculation:         PT Charges       Row Name 11/16/23 0903             Time Calculation    Start Time 0830 (P)   -BM      Stop Time 0849 (P)   -BM      Time Calculation (min) 19 min (P)   -BM      PT Received On 11/16/23 (P)   -BM      PT - Next Appointment 11/17/23 (P)   -BM      PT Goal Re-Cert Due Date 11/23/23 (P)   -BM         Time Calculation- PT    Total Timed Code Minutes- PT 14 minute(s) (P)   -BM         Timed Charges    46691 - PT Therapeutic Exercise Minutes 5 (P)   -BM      05274 - Gait Training Minutes  9 (P)   -BM         Total Minutes    Timed Charges Total Minutes 14 (P)   -BM       Total Minutes 14 (P)   -BM                User Key  (r) = Recorded By, (t) = Taken By, (c) = Cosigned By      Initials Name Provider Type    BM Aayush Coppola, PT Student PT Student                  Therapy Charges for Today       Code Description Service Date Service Provider Modifiers Qty    82775343298 HC GAIT TRAINING EA 15 MIN 11/16/2023 Aayush Coppola, PT Student GP 1    05549589200 HC PT EVAL LOW COMPLEXITY 2 11/16/2023 Aayush Coppola, PT Student GP 1            PT G-Codes  AM-PAC 6 Clicks Score (PT): (P) 19  PT Discharge Summary  Anticipated Discharge Disposition (PT): (P) home with home health    Aayush Coppola PT Student  11/16/2023

## 2023-11-16 NOTE — PROGRESS NOTES
Nutrition Services    Patient Name:  Jose Hurtado  YOB: 1950  MRN: [id number]  Admit Date:  11/14/2023    Assessment Date:  11/16/23    Summary: Follow up     Consulted for MSA on previous date - pt unavailable at that time. Chart reviewed. Visited room this morning. Pt appeared resting with eyes closed, did not awaken. No family at bedside.     Limited oral intake documented thus far. D/w RN who said he is eating ok - no reported difficulty with chew/swallow function. Admitted with nausea & abdominal pain. Being treated for ESBL e.coli UTI. Reviewed weight hx - he is down 30 lb/6 months (15%).     Patient meets ASPEN/AND criteria for nutrition diagnosis of moderate malnutrition of chronic illness based on:  insufficient energy intake, 15% wt loss/6 mos and NFPE revealing moderate muscle and fat loss.     Plan:  Continue to monitor PO intake  Initiate Boost plus BID for added kcal, pro  RD to follow    CLINICAL NUTRITION ASSESSMENT      Reason for Assessment Malnutrition Severity Assessment (MSA), Physician Consult     Diagnosis/Problem   Acute UTI   Medical/Surgical History Past Medical History:   Diagnosis Date    Acute deep vein thrombosis (DVT) of popliteal vein of left lower extremity 03/24/2020    Anemia     Anti-phospholipid syndrome     On lifelong anticoagulation therapy    Bladder disorder     LEAKAGE   ON MED  wers pads    Bleeding hemorrhoids     Chronic kidney disease     Community acquired pneumonia     HISTORY OF IN 2014    Congestive heart failure     COPD (chronic obstructive pulmonary disease)     Deep venous thrombosis 2006, 2008    Left lower extremity multiple    Depression     Esophageal carcinoma 12/31/2014    had chemo and radiation prior surgery    Gastroparesis     Hemorrhoids     HH (hiatus hernia)     History of atrial fibrillation 2015    ONE EPISODE WHILE HOSPITALIZED    History of kidney stones     History of nephrolithiasis     History of pancreatitis     PT STATES  MANY YEARS AGO    History of radiation therapy     History of transfusion     Hypertension     Long-term (current) use of anticoagulants, INR goal 2.0-3.0     Lymphedema     Malignant neoplasm of prostate     Other hyperlipidemia 01/30/2018 January 30, 2018 lipid panel risk 12.8%    Restless legs syndrome     Sleep apnea     OCCASIONALLY WEARS CPAP    Squamous carcinoma     on the head       Past Surgical History:   Procedure Laterality Date    APPENDECTOMY  1950    BRONCHOSCOPY      (Diagnostic)    CARDIAC CATHETERIZATION N/A 05/14/2022    Procedure: Left Heart Cath;  Surgeon: Eric So MD;  Location: [facility] CATH INVASIVE LOCATION;  Service: Cardiology;  Laterality: N/A;    CARDIAC CATHETERIZATION N/A 05/14/2022    Procedure: Coronary angiography;  Surgeon: Eric So MD;  Location: [facility] CATH INVASIVE LOCATION;  Service: Cardiology;  Laterality: N/A;    CARDIAC CATHETERIZATION N/A 05/14/2022    Procedure: Left ventriculography;  Surgeon: Eric So MD;  Location: [facility] CATH INVASIVE LOCATION;  Service: Cardiology;  Laterality: N/A;    CATARACT EXTRACTION Bilateral 2014    COLONOSCOPY  12/15/2014    Complete / Description: EH, IH, torts, stool, follow-up colonoscopy due in 5 years.    COLONOSCOPY N/A 06/13/2017    non-thrombosed external hemorrhoids, normal examined ileum, IH    COLONOSCOPY N/A 02/26/2019    Procedure: COLONOSCOPY with Cold Polypectomy;  Surgeon: Mulugeta Millan MD;  Location: [facility] ENDOSCOPY;  Service: Gastroenterology    COLONOSCOPY N/A 12/16/2020    Procedure: COLONOSCOPY to cecum into TI;  Surgeon: Mesha Sanchez MD;  Location: [facility] ENDOSCOPY;  Service: Gastroenterology;  Laterality: N/A;  pre: lower GI bleed  post: hemorrhoids     CYSTOSCOPY W/ LASER LITHOTRIPSY      ENDOSCOPY N/A 06/13/2017    Procedure: ESOPHAGOGASTRODUODENOSCOPY WITH COLD BIOPSY;  Surgeon: Lake Gonzalez MD;  Location: [facility] ENDOSCOPY;  Service:     ENDOSCOPY N/A  11/18/2021    Procedure: ESOPHAGOGASTRODUODENOSCOPY WITH  DILATATION WITH FLUOROSCOPY;  Surgeon: Jose Christine III, MD;  Location: [facility] ENDOSCOPY;  Service: Gastroenterology;  Laterality: N/A;  Pre: dysphagia, h/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 adinocarcinoma of esophagus  Post: same    ENDOSCOPY N/A 08/07/2023    Procedure: ESOPHAGOGASTRODUODENOSCOPY;  Surgeon: Jameel Valencia MD;  Location[facility] END[facility]COPY;  Service: Gastroenterology;  Laterality: N/A;  PRE- DYSPHAGIA  POST-ABORTED DUE TO RETAINED FOOD    ENDOSCOPY N/A 08/08/2023    Procedure: ESOPHAGOGASTRODUODENOSCOPY with bx;  Surgeon: Jameel Valencia MD;  Location: [facility] ENDOSCOPY;  Service: Gastroenterology;  Laterality: N/A;  pre: N/V, abd pain  post: esophageal nodule, retained food    ESOPHAGECTOMY      April 2015, stage IIB esophageal carcinoma, sub-total resection.    ESOPHAGECTOMY      Esophagectomy Subtotal Andrea Joe Procedure    EXCISION LESION  08/2012    Removal of Squamous Cell CA on Head    HAMMER TOE REPAIR  09/2014    Hammertoe Operation (Each Toe), 10/2014    HAMMER TOE REPAIR Left 10/03/2017    Procedure: Left second third and fourth distal interphalangeal joint resection with flexor tenotomy;  Surgeon: Mulugeta Lira MD;  Location: [facility] OR OSC;  Service:     HEMORRHOIDECTOMY N/A 12/23/2020    Procedure: HEMORRHOIDECTOMY;  Surgeon: Billy Julio Jr., MD;  Location: [facility] MAIN OR;  Service: General;  Laterality: N/A;    HERNIA REPAIR      incisional    JEJUNOSTOMY      Laparoscopic    JEJUNOSTOMY      tube removal     KNEE SURGERY Bilateral 1967, 1973, 1981    PATELLA SURGERY Left     removed    PILONIDAL CYST / SINUS EXCISION      MO ARTHRP KNE CONDYLE&PLATU MEDIAL&LAT COMPARTMENTS Right 03/26/2018    Procedure: TOTAL KNEE ARTHROPLASTY;  Surgeon: Renny Solis MD;  Location: [facility] MAIN OR;  Service: Orthopedics    PROSTATECTOMY  2010    PYLOROPLASTY      SIGMOIDOSCOPY N/A 08/02/2023    Procedure: SIGMOIDOSCOPY FLEXIBLE;  Surgeon: Mesha Sanchez  "MD CECE;  Location: [facility] ENDOSCOPY;  Service: Gastroenterology;  Laterality: N/A;  PRE- ABNORMAL CT  POST- HEMORRHOIDS, ULCERATION    SPINAL FUSION  02/1998    C 5,6    TONSILLECTOMY      UPPER GASTROINTESTINAL ENDOSCOPY  12/15/2014    LA Grade D esophagitis, Pardo's, HH, multiple duodenal ulcers    VENTRAL/INCISIONAL HERNIA REPAIR N/A 04/14/2016    Procedure: VENTRAL/INCISIONAL HERNIA REPAIR, open, with mesh, and component separation;  Surgeon: Darren Rivas MD;  Location: [facility] MAIN OR;  Service:         Anthropometrics        Current Height  Current Weight  BMI kg/m2 Height: 195.6 cm (77\")  Weight: 77.1 kg (170 lb) (11/14/23 2227)  Body mass index is 20.16 kg/m².   Adjusted BMI (if applicable)    BMI Category Normal/Healthy (18.4 - 24.9)   Ideal Body Weight (IBW) 208 lb (94.5 kg)   Usual Body Weight (UBW) Unsure   Weight Trend Loss 30 lb x 6 months (15%)   Weight History Wt Readings from Last 30 Encounters:   11/14/23 2227 77.1 kg (170 lb)   11/13/23 1411 75.8 kg (167 lb 3.2 oz)   11/09/23 1259 77.1 kg (169 lb 14.4 oz)   11/08/23 1113 75.8 kg (167 lb)   09/28/23 1558 75.8 kg (167 lb 3.2 oz)   09/27/23 1507 76.2 kg (168 lb)   08/17/23 1303 81.3 kg (179 lb 3.2 oz)   08/17/23 1054 81.1 kg (178 lb 12.8 oz)   08/15/23 1404 79.3 kg (174 lb 14.4 oz)   08/05/23 1514 77.1 kg (170 lb)   08/05/23 1300 81.6 kg (180 lb)   08/02/23 1439 86.2 kg (190 lb)   07/30/23 1145 78.4 kg (172 lb 13.5 oz)   07/29/23 0941 81.6 kg (180 lb)   07/27/23 1015 81.6 kg (180 lb)   06/23/23 1405 85.5 kg (188 lb 6.4 oz)   05/16/23 1253 84.1 kg (185 lb 6.4 oz)   05/12/23 0734 90.7 kg (200 lb)   04/04/23 1323 93 kg (205 lb)   03/22/23 1254 93.4 kg (205 lb 12.8 oz)   02/17/23 1117 96.1 kg (211 lb 12.8 oz)   02/15/23 1044 93.5 kg (206 lb 1.6 oz)   02/10/23 1351 91.2 kg (201 lb)   01/20/23 1058 99.8 kg (220 lb)   01/03/23 0629 95.4 kg (210 lb 6.4 oz)   01/02/23 2331 97.5 kg (215 lb)   12/28/22 1123 104 kg (230 lb 3.2 oz)   12/21/22 1134 " 102 kg (224 lb)   12/16/22 1041 103 kg (227 lb)   12/05/22 1123 103 kg (227 lb)   11/22/22 1430 99 kg (218 lb 3.2 oz)   10/10/22 1001 107 kg (235 lb)   09/21/22 1126 105 kg (232 lb)        Estimated/Assessed Needs        Current Weight  Weight: 77.1 kg (170 lb) (11/14/23 2227)       Energy Requirements    Weight for Calculation 77 kg   Method for Estimation  30 kcal/kg   EST Needs (kcal/day) 2310       Protein Requirements    Weight for Calculation 77 kg   EST Protein Needs (g/kg) 1.0 - 1.2 gm/kg   EST Daily Needs (g/day) 77-92       Fluid Requirements     Method for Estimation 1 mL/kcal    EST Needs (mL/day) 2310      Labs       Pertinent Labs    Results from last 7 days   Lab Units 11/16/23  0632 11/15/23  0725 11/14/23  2249   SODIUM mmol/L 135* 136 138   POTASSIUM mmol/L 3.9 3.8 3.6   CHLORIDE mmol/L 104 103 102   CO2 mmol/L 26.7 26.1 27.0   BUN mg/dL 21 23 29*   CREATININE mg/dL 1.15 1.18 1.46*   CALCIUM mg/dL 8.3* 8.3* 9.0   BILIRUBIN mg/dL  --   --  0.4   ALK PHOS U/L  --   --  103   ALT (SGPT) U/L  --   --  15   AST (SGOT) U/L  --   --  19   GLUCOSE mg/dL 124* 108* 104*     Results from last 7 days   Lab Units 11/16/23  0632 11/15/23  0725 11/14/23  2249   HEMOGLOBIN g/dL 8.4*   < > 9.9*   HEMATOCRIT % 25.3*   < > 30.4*   WBC 10*3/mm3 5.73   < > 5.86   ALBUMIN g/dL  --   --  3.5    < > = values in this interval not displayed.     Results from last 7 days   Lab Units 11/16/23  0632 11/15/23  0725 11/14/23 2249 11/09/23  1249   PLATELETS 10*3/mm3 199 220 252 274     COVID19   Date Value Ref Range Status   11/14/2023 Not Detected Not Detected - Ref. Range Final     Lab Results   Component Value Date    HGBA1C 5.50 08/06/2021          Medications           Scheduled Medications apixaban, 5 mg, Oral, Q12H  atorvastatin, 40 mg, Oral, Nightly  ertapenem, 1,000 mg, Intravenous, Q24H  [Held by provider] furosemide, 40 mg, Oral, Daily  gabapentin, 100 mg, Oral, TID  metoclopramide, 5 mg, Oral, TID AC  pantoprazole,  40 mg, Oral, Q AM  PARoxetine, 40 mg, Oral, QAM  polyethylene glycol, 17 g, Oral, Daily  potassium chloride, 10 mEq, Oral, Daily  pramipexole, 1.5 mg, Oral, BID  senna-docusate sodium, 2 tablet, Oral, BID  sodium chloride, 10 mL, Intravenous, Q12H  tamsulosin, 0.4 mg, Oral, Daily  tiotropium bromide monohydrate, 2 puff, Inhalation, Daily       Infusions       PRN Medications   acetaminophen **OR** acetaminophen **OR** acetaminophen    albuterol    senna-docusate sodium **AND** polyethylene glycol **AND** bisacodyl **AND** bisacodyl    calcium carbonate    ondansetron **OR** ondansetron    sodium chloride    sodium chloride    [COMPLETED] Insert Peripheral IV **AND** sodium chloride    sodium chloride    sodium chloride     Physical Findings          General Findings loss of muscle mass, loss of subcutaneous fat, room air, other: appeared sleeping   Oral/Mouth Cavity dental appliance upper dentures   Edema  no edema   Gastrointestinal abdominal pain, constipation, nausea, non-distended , last bowel movement: 11/14   Skin  location of wound: L great toe   Tubes/Drains/Lines none   NFPE See Malnutrition Severity Assessment, Date Completed: 11/16   --  Malnutrition Severity Assessment      Patient meets criteria for : Moderate (non-severe) Malnutrition  Malnutrition Type (last 8 hours)       Malnutrition Severity Assessment       Row Name 11/16/23 1017       Malnutrition Severity Assessment    Malnutrition Type Chronic Disease - Related Malnutrition      Row Name 11/16/23 1017       Insufficient Energy Intake     Insufficient Energy Intake Findings Moderate    Insufficient Energy Intake  <75% of est. energy requirement for > or equal to 1 month      Row Name 11/16/23 1017       Unintentional Weight Loss     Unintentional Weight Loss Findings Severe    Unintentional Weight Loss  Weight loss greater than 10% in six months      Row Name 11/16/23 1017       Muscle Loss    Loss of Muscle Mass Findings Moderate    Latter day  Region Moderate - slight depression    Acromion Bone Region Moderate - acromion may slightly protrude      Row Name 11/16/23 1017       Fat Loss    Subcutaneous Fat Loss Findings Moderate    Orbital Region  Moderate -  somewhat hollowness, slightly dark circles    Upper Arm Region Moderate - some fat tissue, not ample      Row Name 11/16/23 1017       Declining Functional Status    Declining Functional Status Findings Measurably Reduced      Row Name 11/16/23 1017       Criteria Met (Must meet criteria for severity in at least 2 of these categories: M Wasting, Fat Loss, Fluid, Secondary Signs, Wt. Status, Intake)    Patient meets criteria for  Moderate (non-severe) Malnutrition                       Current Nutrition Orders & Evaluation of Intake       Oral Nutrition     Food Allergies NKFA   Current PO Diet Diet: Regular/House Diet; Texture: Regular Texture (IDDSI 7); Fluid Consistency: Thin (IDDSI 0)   Supplement n/a   PO Evaluation     % PO Intake Ate ~50% of breakfast tray (was still in room)    Factors Affecting Intake: decreased appetite   --  PES STATEMENT / NUTRITION DIAGNOSIS      Nutrition Dx Problem  Problem: Predicted Suboptimal Intake  Etiology: Medical Diagnosis - acute UTI    Signs/Symptoms: Report/Observation     Nutrition Dx Problem Problem: Malnutrition (moderate)  Etiology: Medical Diagnosis - hx esophageal cancer  Signs/Symptoms: Unintended Weight Change and Report/Observation       NUTRITION INTERVENTION / PLAN OF CARE      Intervention Goal(s) Disease management/therapy, Tolerate PO , Increase intake, and PO intake goal %: 75+         RD Intervention/Action Continue to monitor and Care plan reviewed   --      Prescription/Orders:       PO Diet       Supplements Boost plus BID      Enteral Nutrition       Parenteral Nutrition    New Prescription Ordered? Yes   --      Monitor/Evaluation Per protocol, PO intake, Pertinent labs, Weight, Skin status, Symptoms   Discharge Plan/Needs Pending clinical  course   --    RD to follow per protocol.      Electronically signed by:  Hailey Barnett RD  11/16/23 10:10 EST

## 2023-11-16 NOTE — PLAN OF CARE
Problem: Malnutrition  Goal: Improved Nutritional Intake  Outcome: Ongoing, Progressing  Intervention: Promote and Optimize Oral Intake  Flowsheets (Taken 11/16/2023 1039)  Oral Nutrition Promotion: calorie-dense liquids provided   Goal Outcome Evaluation:   RD consult/assess.    Boost plus BID added.   Encourage PO intake

## 2023-11-16 NOTE — PROGRESS NOTES
"  [facility] Progress Note    Pt doing well today. King catheter in place and draining well. Denies abdominal pain, nausea, or vomiting. Currently being treated with Ertapenem 1000 mg IV q 24 hours.     -AVS, good UOP  - WBC 5.73  - Cr 1.15  - UCx  >100,00 E.coli      Review of Systems:   Positive for: nothing  Negative for: chest pain, cough, sob          Vital Signs  /63 (BP Location: Right arm, Patient Position: Lying)   Pulse 76   Temp 97.5 °F (36.4 °C) (Oral)   Resp 16   Ht 195.6 cm (77\")   Wt 77.1 kg (170 lb)   SpO2 98%   BMI 20.16 kg/m²     Physical Exam:     General Appearance:    Alert, cooperative, NAD   Lungs:     Respirations unlabored, no wheezing   Abdomen:     Soft, nontender, nondistended   :    King catheter in place, draining pale yellow urine   Neuro/Psych:   Orientation intact, mood/affect pleasant, no focal findings        Results Review:     I reviewed the patient's new clinical results.  Lab Results (last 24 hours)       Procedure Component Value Units Date/Time    Vitamin B12 [[id number]]  (Normal) Collected: 11/16/23 0632    Specimen: Blood Updated: 11/16/23 0818     Vitamin B-12 355 pg/mL     Narrative:      Results may be falsely increased if patient taking Biotin.      Folate [[id number]]  (Normal) Collected: 11/16/23 0632    Specimen: Blood Updated: 11/16/23 0818     Folate 9.61 ng/mL     Narrative:      Results may be falsely increased if patient taking Biotin.      Basic Metabolic Panel [[id number]]  (Abnormal) Collected: 11/16/23 0632    Specimen: Blood Updated: 11/16/23 0707     Glucose 124 mg/dL      BUN 21 mg/dL      Creatinine 1.15 mg/dL      Sodium 135 mmol/L      Potassium 3.9 mmol/L      Chloride 104 mmol/L      CO2 26.7 mmol/L      Calcium 8.3 mg/dL      BUN/Creatinine Ratio 18.3     Anion Gap 4.3 mmol/L      eGFR 66.4 mL/min/1.73     Narrative:      GFR Normal >60  Chronic Kidney Disease <60  Kidney Failure <15    The GFR formula is only valid for adults with " stable renal function between ages 18 and 70.    Blood Culture ID, PCR - Blood, Arm, Left [[id number]]  (Abnormal) Collected: 11/14/23 2249    Specimen: Blood from Arm, Left Updated: 11/16/23 0657     BCID, PCR Escherichia coli. Identification by BCID2 PCR.     BCID, PCR 2 CTX-M (ESBL) detected. Identification by BCID2 PCR     BOTTLE TYPE Anaerobic Bottle    CBC (No Diff) [[id number]]  (Abnormal) Collected: 11/16/23 0632    Specimen: Blood Updated: 11/16/23 0649     WBC 5.73 10*3/mm3      RBC 2.95 10*6/mm3      Hemoglobin 8.4 g/dL      Hematocrit 25.3 %      MCV 85.8 fL      MCH 28.5 pg      MCHC 33.2 g/dL      RDW 13.7 %      RDW-SD 43.1 fl      MPV 9.0 fL      Platelets 199 10*3/mm3     Blood Culture - Blood, Arm, Left [[id number]]  (Abnormal) Collected: 11/14/23 2249    Specimen: Blood from Arm, Left Updated: 11/16/23 0456     Blood Culture Abnormal Stain     Gram Stain Anaerobic Bottle Gram negative bacilli    Blood Culture - Blood, Arm, Right [[id number]]  (Normal) Collected: 11/14/23 2249    Specimen: Blood from Arm, Right Updated: 11/15/23 2300     Blood Culture No growth at 24 hours    Ferritin [[id number]]  (Abnormal) Collected: 11/14/23 2249    Specimen: Blood Updated: 11/15/23 1259     Ferritin 676.00 ng/mL     Narrative:      Results may be falsely decreased if patient taking Biotin.      Iron Profile [[id number]]  (Abnormal) Collected: 11/14/23 2249    Specimen: Blood Updated: 11/15/23 1254     Iron 27 mcg/dL      Iron Saturation (TSAT) 11 %      Transferrin 160 mg/dL      TIBC 238 mcg/dL     Urine Culture - Urine, Urine, Clean Catch [[id number]] Collected: 11/15/23 0036    Specimen: Urine, Clean Catch Updated: 11/15/23 1204    Reticulocytes [[id number]]  (Normal) Collected: 11/15/23 0725    Specimen: Blood Updated: 11/15/23 1154     Reticulocyte % 0.74 %      Reticulocyte Absolute 0.0215 10*6/mm3           Imaging Results (Last 24 Hours)       ** No results found for the last 24 hours. **            Medication  Review:   I have personally reviewed    Current Facility-Administered Medications:     acetaminophen (TYLENOL) tablet 650 mg, 650 mg, Oral, Q4H PRN **OR** acetaminophen (TYLENOL) 160 MG/5ML oral solution 650 mg, 650 mg, Oral, Q4H PRN **OR** acetaminophen (TYLENOL) suppository 650 mg, 650 mg, Rectal, Q4H PRN, Astrid Pierson APRN    albuterol (PROVENTIL) nebulizer solution 0.083% 2.5 mg/3mL, 2.5 mg, Nebulization, Q6H PRN, Enoc Suero MD    apixaban (ELIQUIS) tablet 5 mg, 5 mg, Oral, Q12H, Enoc Suero MD, 5 mg at 11/16/23 0801    atorvastatin (LIPITOR) tablet 40 mg, 40 mg, Oral, Nightly, Enoc Suero MD, 40 mg at 11/15/23 2050    sennosides-docusate (PERICOLACE) 8.6-50 MG per tablet 2 tablet, 2 tablet, Oral, BID, 2 tablet at 11/16/23 0801 **AND** polyethylene glycol (MIRALAX) packet 17 g, 17 g, Oral, Daily PRN **AND** bisacodyl (DULCOLAX) EC tablet 5 mg, 5 mg, Oral, Daily PRN **AND** bisacodyl (DULCOLAX) suppository 10 mg, 10 mg, Rectal, Daily PRN, Astrid Pierson APRN    calcium carbonate (TUMS) chewable tablet 500 mg (200 mg elemental), 2 tablet, Oral, BID PRN, Astrid Pierson APRN    ertapenem (INVanz) 1,000 mg in sodium chloride 0.9 % 100 mL IVPB-VTB, 1,000 mg, Intravenous, Q24H, Enoc Suero MD    [Held by provider] furosemide (LASIX) tablet 40 mg, 40 mg, Oral, Daily, Enoc Suero MD    gabapentin (NEURONTIN) capsule 100 mg, 100 mg, Oral, TID, Enoc Suero MD, 100 mg at 11/16/23 0801    metoclopramide (REGLAN) tablet 5 mg, 5 mg, Oral, TID AC, Enoc Suero MD, 5 mg at 11/15/23 1721    ondansetron (ZOFRAN) tablet 4 mg, 4 mg, Oral, Q6H PRN **OR** ondansetron (ZOFRAN) injection 4 mg, 4 mg, Intravenous, Q6H PRN, Astrid Pierson, FAYE    pantoprazole (PROTONIX) EC tablet 40 mg, 40 mg, Oral, Q AM, Enoc Suero MD, 40 mg at 11/16/23 0611    PARoxetine (PAXIL) tablet 40 mg, 40 mg, Oral, QAMNimo,  Enoc Price MD, 40 mg at 11/16/23 0611    polyethylene glycol (MIRALAX) packet 17 g, 17 g, Oral, Daily, Enoc Suero MD, 17 g at 11/16/23 0801    potassium chloride (K-DUR,KLOR-CON) ER tablet 10 mEq, 10 mEq, Oral, Daily, Enoc Suero MD, 10 mEq at 11/16/23 0801    pramipexole (MIRAPEX) tablet 1.5 mg, 1.5 mg, Oral, BID, Enoc Suero MD, 1.5 mg at 11/15/23 2050    sodium chloride 0.9 % flush 10 mL, 10 mL, Intravenous, PRN, Jose Gomez MD    sodium chloride 0.9 % flush 10 mL, 10 mL, Intravenous, PRN, Jose Gomez MD    [COMPLETED] Insert Peripheral IV, , , Once **AND** sodium chloride 0.9 % flush 10 mL, 10 mL, Intravenous, PRN, Jose Gomez MD    sodium chloride 0.9 % flush 10 mL, 10 mL, Intravenous, Q12H, Astrid Pireson APRN, 10 mL at 11/16/23 0802    sodium chloride 0.9 % flush 10 mL, 10 mL, Intravenous, PRN, Astrid Pierson APRN    sodium chloride 0.9 % infusion 40 mL, 40 mL, Intravenous, PRN, Astrid Pierson APRN    tamsulosin (FLOMAX) 24 hr capsule 0.4 mg, 0.4 mg, Oral, Daily, Enoc Suero MD, 0.4 mg at 11/16/23 0801    tiotropium (SPIRIVA RESPIMAT) 2.5 mcg/act aerosol solution inhaler, 2 puff, Inhalation, Daily, Enoc Suero MD    Allergies:    Sulfamethoxazole-trimethoprim    Assessment:    Active Problems:    Acute UTI    History of esophageal cancer    RLS (restless legs syndrome)    Hypertension    Anemia    Peripheral polyneuropathy    B12 deficiency    Gastroparesis    History of recurrent deep vein thrombosis (DVT)    CKD (chronic kidney disease) stage 2, GFR 60-89 ml/min    Atrial fibrillation    Chronic obstructive pulmonary disease    Acute urinary retention    Weight loss    Dehydration    ALEC (acute kidney injury)    Sepsis    Bilateral hydronephrosis    Scarring of lung    Wound of left great toe    Gram-negative bacteremia     Acute UTI  - Ucx + E. Coli, covered by Ertapenem  - WBC  improved    Urinary retention  - Lama in place    Acute kidney injury  - Cr improved       Plan:    Patient to go home with lama catheter  Continue antibiotics as recommended by attending physician  Will schedule patient in office next week for renal ultrasound and follow-up  Urology to sign-off, call for any questions or concerns.       Susana Mazariegos, APRN    11/16/2023  08:55 EST

## 2023-11-16 NOTE — PLAN OF CARE
Goal Outcome Evaluation:  Plan of Care Reviewed With: patient        Progress: improving       Pt a/o x4, RA, VSS. Inserted lama today, will dc with it in place per urology. No new concerns throughout shift.

## 2023-11-16 NOTE — CONSULTS
Referring Provider: No ref. provider found  Reason for Consultation:     bacteremia     Chief Complaint   Patient presents with    Abdominal Pain    Nausea         Subjective   History of present illness: Patient is a 73-year-old male with past medical history of esophageal carcinoma status post esophagectomy, prostate cancer, recurrent urinary tract infections, and chronic urinary retention who presented with weakness and vomiting.  ID consulted for bacteremia.    Patient reports that at home he was having decreased urinary flow and subsequently developed more fatigue.  States after that he had a fever followed by nausea and vomiting.    On presentation to the hospital he was febrile to 102 with blood cultures obtained now growing ESBL E. coli.  Urinalysis was abnormal concerning for urinary tract infection and patient has been started on ertapenem.  CT of the abdomen and pelvis showed bilateral hydroureteronephrosis and large volume of stool consistent with constipation.    Today patient reports he is feeling better after receiving antibiotics.  Denies any further fevers or chills.  States he is feeling stronger.  Denies any dysuria.    Past Medical History:   Diagnosis Date    Acute deep vein thrombosis (DVT) of popliteal vein of left lower extremity 03/24/2020    Anemia     Anti-phospholipid syndrome     On lifelong anticoagulation therapy    Bladder disorder     LEAKAGE   ON MED  wers pads    Bleeding hemorrhoids     Chronic kidney disease     Community acquired pneumonia     HISTORY OF IN 2014    Congestive heart failure     COPD (chronic obstructive pulmonary disease)     Deep venous thrombosis 2006, 2008    Left lower extremity multiple    Depression     Esophageal carcinoma 12/31/2014    had chemo and radiation prior surgery    Gastroparesis     Hemorrhoids     HH (hiatus hernia)     History of atrial fibrillation 2015    ONE EPISODE WHILE HOSPITALIZED    History of kidney stones     History of  nephrolithiasis     History of pancreatitis     PT STATES MANY YEARS AGO    History of radiation therapy     History of transfusion     Hypertension     Long-term (current) use of anticoagulants, INR goal 2.0-3.0     Lymphedema     Malignant neoplasm of prostate     Other hyperlipidemia 01/30/2018 January 30, 2018 lipid panel risk 12.8%    Restless legs syndrome     Sleep apnea     OCCASIONALLY WEARS CPAP    Squamous carcinoma     on the head       Past Surgical History:   Procedure Laterality Date    APPENDECTOMY  1950    BRONCHOSCOPY      (Diagnostic)    CARDIAC CATHETERIZATION N/A 05/14/2022    Procedure: Left Heart Cath;  Surgeon: Eric So MD;  Location: [facility] CATH INVASIVE LOCATION;  Service: Cardiology;  Laterality: N/A;    CARDIAC CATHETERIZATION N/A 05/14/2022    Procedure: Coronary angiography;  Surgeon: Eric So MD;  Location: [facility] CATH INVASIVE LOCATION;  Service: Cardiology;  Laterality: N/A;    CARDIAC CATHETERIZATION N/A 05/14/2022    Procedure: Left ventriculography;  Surgeon: Eric So MD;  Location: [facility] CATH INVASIVE LOCATION;  Service: Cardiology;  Laterality: N/A;    CATARACT EXTRACTION Bilateral 2014    COLONOSCOPY  12/15/2014    Complete / Description: EH, IH, torts, stool, follow-up colonoscopy due in 5 years.    COLONOSCOPY N/A 06/13/2017    non-thrombosed external hemorrhoids, normal examined ileum, IH    COLONOSCOPY N/A 02/26/2019    Procedure: COLONOSCOPY with Cold Polypectomy;  Surgeon: Mulugeta Millan MD;  Location: [facility] ENDOSCOPY;  Service: Gastroenterology    COLONOSCOPY N/A 12/16/2020    Procedure: COLONOSCOPY to cecum into TI;  Surgeon: Mesha Sanchez MD;  Location: [facility] ENDOSCOPY;  Service: Gastroenterology;  Laterality: N/A;  pre: lower GI bleed  post: hemorrhoids     CYSTOSCOPY W/ LASER LITHOTRIPSY      ENDOSCOPY N/A 06/13/2017    Procedure: ESOPHAGOGASTRODUODENOSCOPY WITH COLD BIOPSY;  Surgeon: Lake Gonzalez MD;   Location: [facility] ENDOSCOPY;  Service:     ENDOSCOPY N/A 11/18/2021    Procedure: ESOPHAGOGASTRODUODENOSCOPY WITH  DILATATION WITH FLUOROSCOPY;  Surgeon: Jose Christine III, MD;  Location: [facility] ENDOSCOPY;  Service: Gastroenterology;  Laterality: N/A;  Pre: dysphagia, h/x of adinocarcinoma of esophagus  Post: same    ENDOSCOPY N/A 08/07/2023    Procedure: ESOPHAGOGASTRODUODENOSCOPY;  Surgeon: Jameel Valencia MD;  Location: [facility] ENDOSCOPY;  Service: Gastroenterology;  Laterality: N/A;  PRE- DYSPHAGIA  POST-ABORTED DUE TO RETAINED FOOD    ENDOSCOPY N/A 08/08/2023    Procedure: ESOPHAGOGASTRODUODENOSCOPY with bx;  Surgeon: Jameel Valencia MD;  Location: [facility] ENDOSCOPY;  Service: Gastroenterology;  Laterality: N/A;  pre: N/V, abd pain  post: esophageal nodule, retained food    ESOPHAGECTOMY      April 2015, stage IIB esophageal carcinoma, sub-total resection.    ESOPHAGECTOMY      Esophagectomy Subtotal Andrea Joe Procedure    EXCISION LESION  08/2012    Removal of Squamous Cell CA on Head    HAMMER TOE REPAIR  09/2014    Hammertoe Operation (Each Toe), 10/2014    HAMMER TOE REPAIR Left 10/03/2017    Procedure: Left second third and fourth distal interphalangeal joint resection with flexor tenotomy;  Surgeon: Mulugeta Lira MD;  Location: [facility] OR [facility];  Service:     HEMORRHOIDECTOMY N/A 12/23/2020    Procedure: HEMORRHOIDECTOMY;  Surgeon: Billy Julio Jr., MD;  Location: [facility] MAIN OR;  Service: General;  Laterality: N/A;    HERNIA REPAIR      incisional    JEJUNOSTOMY      Laparoscopic    JEJUNOSTOMY      tube removal     KNEE SURGERY Bilateral 1967, 1973, 1981    PATELLA SURGERY Left     removed    PILONIDAL CYST / SINUS EXCISION      MD ARTHRP KNE CONDYLE&PLATU MEDIAL&LAT COMPARTMENTS Right 03/26/2018    Procedure: TOTAL KNEE ARTHROPLASTY;  Surgeon: Renny Solis MD;  Location: [facility] MAIN OR;  Service: Orthopedics    PROSTATECTOMY  2010    PYLOROPLASTY      SIGMOIDOSCOPY N/A 08/02/2023     Procedure: SIGMOIDOSCOPY FLEXIBLE;  Surgeon: Mesha Sanchez MD;  Location: [facility] ENDOSCOPY;  Service: Gastroenterology;  Laterality: N/A;  PRE- ABNORMAL CT  POST- HEMORRHOIDS, ULCERATION    SPINAL FUSION  02/1998    C 5,6    TONSILLECTOMY      UPPER GASTROINTESTINAL ENDOSCOPY  12/15/2014    LA Grade D esophagitis, Pardo's, HH, multiple duodenal ulcers    VENTRAL/INCISIONAL HERNIA REPAIR N/A 04/14/2016    Procedure: VENTRAL/INCISIONAL HERNIA REPAIR, open, with mesh, and component separation;  Surgeon: Darren Rivas MD;  Location: [facility] MAIN OR;  Service:        family history includes Anxiety disorder in his father; Cancer in his father; Cerebral aneurysm in his mother; No Known Problems in his brother; Pancreatic cancer in his nephew; Prostate cancer (age of onset: 68) in his brother; Suicide Attempts in his father.     reports that he quit smoking about 49 years ago. His smoking use included cigarettes. He has a 6.00 pack-year smoking history. He has quit using smokeless tobacco.  His smokeless tobacco use included chew. He reports current alcohol use of about 3.0 standard drinks of alcohol per week. He reports that he does not use drugs.     Allergies   Allergen Reactions    Sulfamethoxazole-Trimethoprim Nausea Only       Medication:  Antibiotics:  Anti-Infectives (From admission, onward)      Ordered     Dose/Rate Route Frequency Start Stop    11/16/23 0742  ertapenem (INVanz) 1,000 mg in sodium chloride 0.9 % 100 mL IVPB-VTB        Ordering Provider: Enoc Suero MD    1,000 mg  200 mL/hr over 30 Minutes Intravenous Every 24 Hours 11/16/23 0830 11/23/23 0829    11/15/23 0128  cefTRIAXone (ROCEPHIN) 2 g in sodium chloride 0.9 % 100 mL IVPB-VTB        Ordering Provider: Jose Gomez MD    2 g  200 mL/hr over 30 Minutes Intravenous Once 11/15/23 0144 11/15/23 0230              Objective     Physical Exam:   Vital Signs   Temp:  [97.5 °F (36.4 °C)-98.4 °F (36.9 °C)] 97.5 °F  "(36.4 °C)  Heart Rate:  [72-78] 76  Resp:  [16-20] 16  BP: (108-134)/(57-65) 129/63    GENERAL: Awake and alert, in no acute distress.   HEENT: Oropharynx is clear. Hearing is grossly normal.   EYES: PERRL. No conjunctival injection. No lid lag.   LUNGS: Normal work of breathing  GI: Soft, nontender, nondistended.   SKIN: Warm and dry without cutaneous eruptions in exposed areas  PSYCHIATRIC: Appropriate mood, affect, insight, and judgment.     Results Review:   I reviewed the patient's new clinical results.  I reviewed the patient's new imaging results and agree with the interpretation.  I reviewed the patient's other test results and agree with the interpretation    Lab Results   Component Value Date    WBC 5.73 11/16/2023    HGB 8.4 (L) 11/16/2023    HCT 25.3 (L) 11/16/2023    MCV 85.8 11/16/2023     11/16/2023       No results found for: \"VANCOPEAK\", \"VANCOTROUGH\", \"VANCORANDOM\"    Lab Results   Component Value Date    GLUCOSE 124 (H) 11/16/2023    BUN 21 11/16/2023    CREATININE 1.15 11/16/2023    EGFRIFNONA 62 06/04/2021    EGFRIFAFRI 75 06/04/2021    BCR 18.3 11/16/2023    CO2 26.7 11/16/2023    CALCIUM 8.3 (L) 11/16/2023    PROTENTOTREF 6.3 06/23/2023    ALBUMIN 3.5 11/14/2023    LABIL2 1.5 06/23/2023    AST 19 11/14/2023    ALT 15 11/14/2023         Estimated Creatinine Clearance: 60.5 mL/min (by C-G formula based on SCr of 1.15 mg/dL).      Microbiology:      Radiology:  11/15 CT abdomen and pelvis report reviewed with bilateral hydroureteronephrosis and large amount of stool.    11/14 chest x-ray reviewed by me with no acute infiltrates.  Chronic right lower consolidation.    Assessment     #ESBL E. coli septicemia  #Acute UTI  #Hydroureteronephrosis  #Urinary retention  #ALEC  #Prostate cancer status postradiation  #Constipation    Continue ertapenem 1 g daily targeting ESBL E. coli from a likely urinary source.  Follow-up urine culture for correlation.  Patient is going to need a minimum of 7 days " of IV therapy pending clearance of blood cultures.  Repeat blood cultures today.      Thank you for this consult.  We will continue to follow along and tailor antibiotics as the patient's clinical course evolves.

## 2023-11-16 NOTE — PLAN OF CARE
Goal Outcome Evaluation:           Progress: (P) no change  Outcome Evaluation: (P) Pt is a 74 y/o male presenting with an acute UTI and general weakness. Prior to admission pt was independent with all mobility using a straight cane or rwx. Pt completed bed mobility with SBA, sit to stand with CGA and ambulated 200' with rwx and SBA. Cues were given for postural adjustment due to his forward flexed posture while ambulating. Pt could maintain an upright posture briefly but returned back to the flexed posture. Pt still demonstrates generalized weakness and would continue to benefit from PT to increase strength, independence and return to PLOF.      Anticipated Discharge Disposition (PT): (P) home with home health

## 2023-11-17 ENCOUNTER — READMISSION MANAGEMENT (OUTPATIENT)
Dept: CALL CENTER | Facility: HOSPITAL | Age: 73
End: 2023-11-17
Payer: MEDICARE

## 2023-11-17 ENCOUNTER — HOME HEALTH ADMISSION (OUTPATIENT)
Dept: HOME HEALTH SERVICES | Facility: HOME HEALTHCARE | Age: 73
End: 2023-11-17
Payer: MEDICARE

## 2023-11-17 ENCOUNTER — TELEPHONE (OUTPATIENT)
Dept: INTERNAL MEDICINE | Age: 73
End: 2023-11-17

## 2023-11-17 ENCOUNTER — DOCUMENTATION (OUTPATIENT)
Dept: HOME HEALTH SERVICES | Facility: HOME HEALTHCARE | Age: 73
End: 2023-11-17
Payer: MEDICARE

## 2023-11-17 ENCOUNTER — TRANSCRIBE ORDERS (OUTPATIENT)
Dept: HOME HEALTH SERVICES | Facility: HOME HEALTHCARE | Age: 73
End: 2023-11-17
Payer: MEDICARE

## 2023-11-17 VITALS
OXYGEN SATURATION: 96 % | DIASTOLIC BLOOD PRESSURE: 57 MMHG | HEIGHT: 77 IN | TEMPERATURE: 98.2 F | BODY MASS INDEX: 20.07 KG/M2 | SYSTOLIC BLOOD PRESSURE: 104 MMHG | RESPIRATION RATE: 16 BRPM | WEIGHT: 170 LBS | HEART RATE: 74 BPM

## 2023-11-17 DIAGNOSIS — N39.0 ACUTE UTI: Primary | ICD-10-CM

## 2023-11-17 DIAGNOSIS — S91.109A OPEN TOE WOUND, INITIAL ENCOUNTER: Chronic | ICD-10-CM

## 2023-11-17 DIAGNOSIS — Z97.8 FOLEY CATHETER IN PLACE: ICD-10-CM

## 2023-11-17 PROBLEM — E86.0 DEHYDRATION: Status: RESOLVED | Noted: 2023-08-09 | Resolved: 2023-11-17

## 2023-11-17 PROBLEM — A41.9 SEPSIS: Status: RESOLVED | Noted: 2023-11-15 | Resolved: 2023-11-17

## 2023-11-17 PROBLEM — N17.9 AKI (ACUTE KIDNEY INJURY): Status: RESOLVED | Noted: 2023-11-15 | Resolved: 2023-11-17

## 2023-11-17 LAB
ANION GAP SERPL CALCULATED.3IONS-SCNC: 6 MMOL/L (ref 5–15)
BACTERIA SPEC AEROBE CULT: ABNORMAL
BUN SERPL-MCNC: 16 MG/DL (ref 8–23)
BUN/CREAT SERPL: 15.2 (ref 7–25)
CALCIUM SPEC-SCNC: 8.6 MG/DL (ref 8.6–10.5)
CHLORIDE SERPL-SCNC: 104 MMOL/L (ref 98–107)
CO2 SERPL-SCNC: 26 MMOL/L (ref 22–29)
CREAT SERPL-MCNC: 1.05 MG/DL (ref 0.76–1.27)
DEPRECATED RDW RBC AUTO: 43.2 FL (ref 37–54)
EGFRCR SERPLBLD CKD-EPI 2021: 74 ML/MIN/1.73
ERYTHROCYTE [DISTWIDTH] IN BLOOD BY AUTOMATED COUNT: 13.7 % (ref 12.3–15.4)
GLUCOSE SERPL-MCNC: 105 MG/DL (ref 65–99)
HCT VFR BLD AUTO: 26.4 % (ref 37.5–51)
HGB BLD-MCNC: 8.6 G/DL (ref 13–17.7)
MCH RBC QN AUTO: 28 PG (ref 26.6–33)
MCHC RBC AUTO-ENTMCNC: 32.6 G/DL (ref 31.5–35.7)
MCV RBC AUTO: 86 FL (ref 79–97)
PLATELET # BLD AUTO: 209 10*3/MM3 (ref 140–450)
PMV BLD AUTO: 9 FL (ref 6–12)
POTASSIUM SERPL-SCNC: 3.7 MMOL/L (ref 3.5–5.2)
RBC # BLD AUTO: 3.07 10*6/MM3 (ref 4.14–5.8)
SODIUM SERPL-SCNC: 136 MMOL/L (ref 136–145)
WBC NRBC COR # BLD AUTO: 5.21 10*3/MM3 (ref 3.4–10.8)

## 2023-11-17 PROCEDURE — 94640 AIRWAY INHALATION TREATMENT: CPT

## 2023-11-17 PROCEDURE — 97110 THERAPEUTIC EXERCISES: CPT

## 2023-11-17 PROCEDURE — C1751 CATH, INF, PER/CENT/MIDLINE: HCPCS

## 2023-11-17 PROCEDURE — 80048 BASIC METABOLIC PNL TOTAL CA: CPT | Performed by: INTERNAL MEDICINE

## 2023-11-17 PROCEDURE — 99232 SBSQ HOSP IP/OBS MODERATE 35: CPT | Performed by: STUDENT IN AN ORGANIZED HEALTH CARE EDUCATION/TRAINING PROGRAM

## 2023-11-17 PROCEDURE — 36410 VNPNXR 3YR/> PHY/QHP DX/THER: CPT

## 2023-11-17 PROCEDURE — 85027 COMPLETE CBC AUTOMATED: CPT | Performed by: INTERNAL MEDICINE

## 2023-11-17 PROCEDURE — 25010000002 ERTAPENEM PER 500 MG: Performed by: INTERNAL MEDICINE

## 2023-11-17 PROCEDURE — 94799 UNLISTED PULMONARY SVC/PX: CPT

## 2023-11-17 RX ORDER — SODIUM CHLORIDE 9 MG/ML
40 INJECTION, SOLUTION INTRAVENOUS AS NEEDED
Status: DISCONTINUED | OUTPATIENT
Start: 2023-11-17 | End: 2023-11-17 | Stop reason: HOSPADM

## 2023-11-17 RX ORDER — FUROSEMIDE 20 MG
20 TABLET ORAL DAILY
Status: DISCONTINUED | OUTPATIENT
Start: 2023-11-17 | End: 2023-11-17

## 2023-11-17 RX ORDER — SODIUM CHLORIDE 0.9 % (FLUSH) 0.9 %
20 SYRINGE (ML) INJECTION AS NEEDED
Status: CANCELLED | OUTPATIENT
Start: 2023-11-17

## 2023-11-17 RX ORDER — FUROSEMIDE 20 MG
20 TABLET ORAL DAILY
Status: DISCONTINUED | OUTPATIENT
Start: 2023-11-17 | End: 2023-11-17 | Stop reason: HOSPADM

## 2023-11-17 RX ORDER — FUROSEMIDE 40 MG
20 TABLET ORAL DAILY
Start: 2023-11-17 | End: 2024-05-01

## 2023-11-17 RX ORDER — LANOLIN ALCOHOL/MO/W.PET/CERES
500 CREAM (GRAM) TOPICAL DAILY
Qty: 90 TABLET | Refills: 0 | Status: SHIPPED | OUTPATIENT
Start: 2023-11-18 | End: 2024-05-01

## 2023-11-17 RX ORDER — SODIUM CHLORIDE 0.9 % (FLUSH) 0.9 %
10 SYRINGE (ML) INJECTION EVERY 12 HOURS SCHEDULED
Status: DISCONTINUED | OUTPATIENT
Start: 2023-11-17 | End: 2023-11-17 | Stop reason: HOSPADM

## 2023-11-17 RX ORDER — SODIUM CHLORIDE 0.9 % (FLUSH) 0.9 %
10 SYRINGE (ML) INJECTION AS NEEDED
Status: DISCONTINUED | OUTPATIENT
Start: 2023-11-17 | End: 2023-11-17 | Stop reason: HOSPADM

## 2023-11-17 RX ORDER — ACETAMINOPHEN 325 MG/1
650 TABLET ORAL EVERY 6 HOURS PRN
Start: 2023-11-17

## 2023-11-17 RX ORDER — SODIUM CHLORIDE 0.9 % (FLUSH) 0.9 %
10 SYRINGE (ML) INJECTION EVERY 12 HOURS SCHEDULED
Status: CANCELLED | OUTPATIENT
Start: 2023-11-17

## 2023-11-17 RX ORDER — SODIUM CHLORIDE 0.9 % (FLUSH) 0.9 %
10 SYRINGE (ML) INJECTION AS NEEDED
Status: CANCELLED | OUTPATIENT
Start: 2023-11-17

## 2023-11-17 RX ADMIN — PRAMIPEXOLE DIHYDROCHLORIDE 1.5 MG: 1.5 TABLET ORAL at 08:33

## 2023-11-17 RX ADMIN — TIOTROPIUM BROMIDE INHALATION SPRAY 2 PUFF: 3.12 SPRAY, METERED RESPIRATORY (INHALATION) at 09:52

## 2023-11-17 RX ADMIN — FUROSEMIDE 20 MG: 20 TABLET ORAL at 11:07

## 2023-11-17 RX ADMIN — SENNOSIDES AND DOCUSATE SODIUM 2 TABLET: 50; 8.6 TABLET ORAL at 08:33

## 2023-11-17 RX ADMIN — TAMSULOSIN HYDROCHLORIDE 0.4 MG: 0.4 CAPSULE ORAL at 08:34

## 2023-11-17 RX ADMIN — APIXABAN 5 MG: 5 TABLET, FILM COATED ORAL at 08:34

## 2023-11-17 RX ADMIN — METOCLOPRAMIDE 5 MG: 5 TABLET ORAL at 06:32

## 2023-11-17 RX ADMIN — Medication 10 ML: at 08:35

## 2023-11-17 RX ADMIN — Medication 1000 MCG: at 08:35

## 2023-11-17 RX ADMIN — PAROXETINE HYDROCHLORIDE HEMIHYDRATE 40 MG: 20 TABLET, FILM COATED ORAL at 08:34

## 2023-11-17 RX ADMIN — PANTOPRAZOLE SODIUM 40 MG: 40 TABLET, DELAYED RELEASE ORAL at 06:32

## 2023-11-17 RX ADMIN — METOCLOPRAMIDE 5 MG: 5 TABLET ORAL at 11:07

## 2023-11-17 RX ADMIN — POTASSIUM CHLORIDE 10 MEQ: 750 TABLET, EXTENDED RELEASE ORAL at 08:35

## 2023-11-17 RX ADMIN — ERTAPENEM SODIUM 1000 MG: 1 INJECTION, POWDER, LYOPHILIZED, FOR SOLUTION INTRAMUSCULAR; INTRAVENOUS at 08:33

## 2023-11-17 RX ADMIN — GABAPENTIN 100 MG: 100 CAPSULE ORAL at 08:33

## 2023-11-17 NOTE — PLAN OF CARE
Goal Outcome Evaluation:              Outcome Evaluation: Declined activity tolerance during PT today.  Able toa mbulate 50' w/ contact assist using RW, pt declined further distance 2/2 fatigue.  Able to perform sitting exercises w/ ed for HEP.  Recommend home w/ home PT, pt reports owning a RW.      Anticipated Discharge Disposition (PT): home with home health

## 2023-11-17 NOTE — PLAN OF CARE
Goal Outcome Evaluation:  Plan of Care Reviewed With: patient        Progress: improving  Outcome Evaluation: Pt remains stable. Ambulates with 1 assist, fc in place. C/O constipation, dulcolax and stool softeners given. ABX continued. New blood cxs taken. Educated on htn management. Contact precautions maintained. DC plan pending.

## 2023-11-17 NOTE — PLAN OF CARE
Goal Outcome Evaluation:           Progress: improving  Outcome Evaluation: Patient deemed stable for discharge by MD. Home health provided midline abx and supplies. Spouse and patient educated on midline and lama care. Personal hygeine and extra leg bags sent home with pt.

## 2023-11-17 NOTE — DISCHARGE SUMMARY
[facility] Medicine Services  DISCHARGE SUMMARY    Patient Name: Jose Hurtado  : 1950  MRN: [id number]    Date of Admission: 2023 10:30 PM  Date of Discharge: 2023  Primary Care Physician: Brandin Bolton MD    Consults       Date and Time Order Name Status Description    2023  6:08 AM Inpatient Infectious Diseases Consult Completed     11/15/2023  2:32 AM Inpatient Urology Consult Completed     11/15/2023  1:31 AM [facility] (on-call MD unless specified) Details              Hospital Course       Active Hospital Problems    Diagnosis  POA    **Acute UTI [N39.0]  Yes    Bacteremia due to Escherichia coli [R78.81, B96.20]  Yes    Moderate malnutrition [E44.0]  Yes    Bilateral hydronephrosis [N13.30]  Yes    Scarring of lung [J98.4]  Yes    Wound of left great toe [S91.102A]  Yes    Weight loss [R63.4]  Yes    Acute urinary retention [R33.8]  Yes    Chronic obstructive pulmonary disease [J44.9]  Yes    Atrial fibrillation [I48.91]  Yes    CKD (chronic kidney disease) stage 2, GFR 60-89 ml/min [N18.2]  Yes    Infection due to ESBL-producing Escherichia coli [A49.8, Z16.12]  Yes    History of recurrent deep vein thrombosis (DVT) [Z86.718]  Not Applicable    Gastroparesis [K31.84]  Yes    B12 deficiency [E53.8]  Yes    Peripheral polyneuropathy [G62.9]  Yes    Anemia [D64.9]  Yes    Hypertension [I10]  Yes    History of esophageal cancer [Z85.01]  Yes    RLS (restless legs syndrome) [G25.81]  Yes      Resolved Hospital Problems    Diagnosis Date Resolved POA    ALEC (acute kidney injury) [N17.9] 2023 Yes    Sepsis [A41.9] 2023 Yes    Dehydration [E86.0] 2023 Yes          Hospital Course:  Jose Hurtado is a 73 y.o. male  multiple chronic medical issues presents the hospital with sepsis secondary to urinary tract infection with urine obstruction causing bilateral hydronephrosis and acute kidney injury.        Sepsis due to urinary tract infection and bacteremia from ESBL E.  coli:  Initially with fevers, tachycardia, and tachypnea, improved with treatment  Improved with broad-spectrum antibiotic coverage.    Blood culture returned positive for ESBL E. coli and urine culture positive for E. coli  Infectious disease recommending 7-day treatment course with Invanz.  Patient has completed 2 days in the hospital and they are arranging for 5 further days outside the hospital with a midline.     Urine retention with bilateral hydronephrosis:  Acute on chronic issue.  Continue Flomax.  Urology consult team evaluated.    They have instructed just to insert King catheter which he will continue at discharge and follow-up in their clinic for King exchange or possible voiding trial.         Acute kidney injury with CKD 2:  Previous records reviewed and baseline creatinine appears to be 1.0-1.1.  Patient was 1.46 at admission.  Improved with IV fluids.  Renal dysfunction likely related to sepsis and obstruction.     Gastroparesis, weight loss, poor nutrition:  Encourage oral intake.  Consult nutrition for malnutrition assessment.  Continue home medications.  No current issues     Anemia:  Acute on chronic issue.  Some drop could be from dilution.  Baseline hemoglobin appears to be 9.4.  No bleeding reported.  Previous studies seem to be consistent with anemia of chronic disease with probable iron deficiency and noted history of B12 deficiency.  No sign of bleeding.  B12 level was borderline low at less than 400.  I will restart him on B12.  Recommend he be on B12 supplementation lifelong.  If he stops it again he would likely become B12 deficient again     COPD:  Continue Spiriva.  Respiratory status is stable     Peripheral neuropathy and restless leg: Continue home medication.  Monitor.     Paroxysmal atrial fibrillation and essential hypertension:  Continue anticoagulation  Diuretic furosemide was held initially and restarted at lower dose due to acute illness.  Primary care can reevaluate  volume status and determine whether patient needs to go back to previous Lasix dose and primary care follow-up.     Left great toe wound: Consult wound care to evaluate, Betadine and dressing changes, sees outpatient wound care and will continue at discharge and continue with wound care at discharge.  Please see wound care and note for further details.    At the time of discharge patient was told to take all medications as prescribed, keep all follow-up appointments, and call their doctor or return to the hospital with any worsening or concerning symptoms.    Please note that this note was made using Dragon voice recognition software            Day of Discharge     Subjective:  Feels well.  Very eager to discharge today.  Denies any new complaints.  Agrees with plan for outpatient antibiotics.  No new complaints.    Vital Signs:   Temp:  [97.4 °F (36.3 °C)-98.2 °F (36.8 °C)] 98.2 °F (36.8 °C)  Heart Rate:  [69-76] 74  Resp:  [16] 16  BP: (104-128)/(57-73) 104/57     Physical Exam:  Constitutional: Awake, alert, elderly, clinically appears improving  Neck: Supple, no thyromegaly, no lymphadenopathy, trachea midline  Respiratory: No cough or wheezing,  nonlabored respirations   Cardiovascular: Pulse rate is normal, palpable radial pulses bilaterally  Gastrointestinal:nondistended  Musculoskeletal: Thin, BMI is 20, no bilateral ankle edema, no clubbing or cyanosis to extremities  Psychiatric: Appropriate affect, cooperative  Neurologic: Oriented, no slurred speech or facial droop, conversational, able to follow instructions, moving extremities  Skin: No rashes or jaundice, toe wound with dressing      Pertinent  and/or Most Recent Results     Results from last 7 days   Lab Units 11/17/23  0444 11/16/23  0632 11/15/23  0725 11/14/23  2249   WBC 10*3/mm3 5.21 5.73 7.50 5.86   HEMOGLOBIN g/dL 8.6* 8.4* 8.4* 9.9*   HEMATOCRIT % 26.4* 25.3* 26.1* 30.4*   PLATELETS 10*3/mm3 209 199 220 252   SODIUM mmol/L 136 135* 136 138  "  POTASSIUM mmol/L 3.7 3.9 3.8 3.6   CHLORIDE mmol/L 104 104 103 102   CO2 mmol/L 26.0 26.7 26.1 27.0   BUN mg/dL 16 21 23 29*   CREATININE mg/dL 1.05 1.15 1.18 1.46*   GLUCOSE mg/dL 105* 124* 108* 104*   CALCIUM mg/dL 8.6 8.3* 8.3* 9.0     Results from last 7 days   Lab Units 11/14/23  2249   BILIRUBIN mg/dL 0.4   ALK PHOS U/L 103   ALT (SGPT) U/L 15   AST (SGOT) U/L 19           Invalid input(s): \"TG\", \"LDLCALC\", \"LDLREALC\"  Results from last 7 days   Lab Units 11/14/23  2249   LACTATE mmol/L 1.0       Brief Urine Lab Results  (Last result in the past 365 days)        Color   Clarity   Blood   Leuk Est   Nitrite   Protein   CREAT   Urine HCG        11/15/23 0036 Yellow   Cloudy   Moderate (2+)   Moderate (2+)   Negative   Negative                   Microbiology Results Abnormal       Procedure Component Value - Date/Time    Blood Culture - Blood, Arm, Right [[id number]]  (Normal) Collected: 11/16/23 0932    Lab Status: Preliminary result Specimen: Blood from Arm, Right Updated: 11/17/23 1000     Blood Culture No growth at 24 hours    Blood Culture - Blood, Hand, Right [[id number]]  (Normal) Collected: 11/16/23 0939    Lab Status: Preliminary result Specimen: Blood from Hand, Right Updated: 11/17/23 1000     Blood Culture No growth at 24 hours    Respiratory Panel PCR w/COVID-19(SARS-CoV-2) [facility]ON[facility]/HOWARD/CARMEN/PAD/COR/ALEXANDER In-House, NP Swab in UTM/VTM, 2 HR TAT - Swab, Nasopharynx [[id number]]  (Normal) Collected: 11/14/23 2309    Lab Status: Final result Specimen: Swab from Nasopharynx Updated: 11/15/23 0001     ADENOVIRUS, PCR Not Detected     Coronavirus 229E Not Detected     Coronavirus HKU1 Not Detected     Coronavirus NL63 Not Detected     Coronavirus OC43 Not Detected     COVID19 Not Detected     Human Metapneumovirus Not Detected     Human Rhinovirus/Enterovirus Not Detected     Influenza A PCR Not Detected     Influenza B PCR Not Detected     Parainfluenza Virus 1 Not Detected     Parainfluenza Virus 2 Not Detected    "  Parainfluenza Virus 3 Not Detected     Parainfluenza Virus 4 Not Detected     RSV, PCR Not Detected     Bordetella pertussis pcr Not Detected     Bordetella parapertussis PCR Not Detected     Chlamydophila pneumoniae PCR Not Detected     Mycoplasma pneumo by PCR Not Detected    Narrative:      In the setting of a positive respiratory panel with a viral infection PLUS a negative procalcitonin without other underlying concern for bacterial infection, consider observing off antibiotics or discontinuation of antibiotics and continue supportive care. If the respiratory panel is positive for atypical bacterial infection (Bordetella pertussis, Chlamydophila pneumoniae, or Mycoplasma pneumoniae), consider antibiotic de-escalation to target atypical bacterial infection.          Urine culture 11/15/2023  >100,000 CFU/mL Escherichia coli ESBL Abnormal      Blood culture 11/14/2023  CTX-M (ESBL) detected. Identification by BCID2 PCR Critical   Escherichia coli. Identification by BCID2 PCR      Imaging Results (All)       Procedure Component Value Units Date/Time    CT Abdomen Pelvis Without Contrast [[id number]] Collected: 11/15/23 0023     Updated: 11/15/23 0035    Narrative:      CT OF THE ABDOMEN AND PELVIS WITHOUT CONTRAST     HISTORY: Weakness     COMPARISON: July 29, 2023     TECHNIQUE: Axial CT imaging was obtained through the abdomen and pelvis.  No IV contrast was administered.     FINDINGS:  The patient is again noted to be status post esophagectomy and gastric  pull-up. The configuration appears similar to the prior study. There is  scarring identified at the right lung base. There is a chronic loculated  right pleural effusion, as well as pleural thickening. Left lung base is  clear. No suspicious hepatic lesions are seen. Spleen, pancreas,  gallbladder, right adrenal gland, and duodenum appear normal. There is a  stable bulky appearance to the left adrenal gland. Punctate  nonobstructing stones are noted within  both kidneys. Largest stone on  the right measures 4 mm. Stone on the left measures 4 mm as well. There  is mild to moderate bilateral hydroureteronephrosis. The patient had  similar findings on prior CT. No distal ureteral or bladder stones are  identified. The urinary bladder is distended, and the appearance may be  related to urinary retention. The prostate gland is absent. Patient is  again noted to have some wall thickening involving the rectum, which  could reflect proctitis. Large volume of stool is noted throughout the  colon, suggesting constipation. There is no evidence of small bowel  obstruction. There are aortoiliac calcifications. Sacral nerve  stimulator is noted. No acute osseous abnormalities are seen. There is  no evidence of appendicitis. Right testicle appears to be currently  positioned within the right inguinal canal.       Impression:         1. Chronic postoperative changes noted at the right lung base.  2. Bilateral hydroureteronephrosis, which may be secondary to urinary  retention.  3. Wall thickening involving the rectum. Similar findings were present  in May 2022 and other prior studies. Appearance may reflect nonspecific  proctitis.  4. Large volume of stool throughout the colon, suggesting constipation.  Radiation dose reduction techniques were utilized, including automated  exposure control and exposure modulation based on body size.        This report was finalized on 11/15/2023 12:32 AM by Dr. Melani Washington M.D on Workstation: [id number]       XR Chest 1 View [[id number]] Collected: 11/14/23 2322     Updated: 11/14/23 2327    Narrative:      SINGLE VIEW OF THE CHEST     HISTORY: Fever     COMPARISON: None available.     FINDINGS:  This patient has a history of esophagectomy and gastric pull-up. The  appearance of the mediastinum is similar when compared to the exam from  August 5, 2023. There is blunting of the right costophrenic angle. On  previous study, this appears to be  secondary to chronic effusion. There  is scarring noted at the right lung base, although I cannot exclude a  superimposed infiltrate. Left lung is clear. No pneumothorax is seen.       Impression:      Chronic scarring identified at the right lung base. Superimposed  infiltrate cannot be excluded.     This report was finalized on 11/14/2023 11:24 PM by Dr. Melani Washington M.D on Workstation: [id number]               Results for orders placed in visit on 10/10/22    Duplex Venous Lower Extremity - Left CAR    Interpretation Summary    Chronic left lower extremity deep vein thrombosis noted in the popliteal and gastrocnemius.    All other left sided veins appeared normal.      Results for orders placed in visit on 10/10/22    Duplex Venous Lower Extremity - Left CAR    Interpretation Summary    Chronic left lower extremity deep vein thrombosis noted in the popliteal and gastrocnemius.    All other left sided veins appeared normal.      Results for orders placed during the hospital encounter of 05/12/22    Adult Transthoracic Echo Complete w/ Color, Spectral and Contrast if necessary per protocol    Interpretation Summary  · Estimated right ventricular systolic pressure from tricuspid regurgitation is mildly elevated (35-45 mmHg). Calculated right ventricular systolic pressure from tricuspid regurgitation is 43 mmHg.  · Left ventricular wall thickness is consistent with mild concentric hypertrophy.  · Estimated left ventricular EF = 56% Left ventricular systolic function is normal.  · Left ventricular diastolic function was normal.        Discharge Details        Discharge Medications        New Medications        Instructions Start Date   acetaminophen 325 MG tablet  Commonly known as: TYLENOL   650 mg, Oral, Every 6 Hours PRN      ertapenem 1,000 mg in sodium chloride 0.9 % 100 mL IVPB   1,000 mg, Intravenous, Every 24 Hours   Start Date: November 18, 2023            Changes to Medications        Instructions  Start Date   cyanocobalamin 1000 MCG/ML injection  What changed: Another medication with the same name was added. Make sure you understand how and when to take each.   INJECT 1 ML INTRAMUSCULARLY ONCE EVERY 30 DAYS AS DIRECTED      Vitamin B12 500 MCG tablet tablet  Commonly known as: CYANOCOBALAMIN  What changed: You were already taking a medication with the same name, and this prescription was added. Make sure you understand how and when to take each.   500 mcg, Oral, Daily   Start Date: November 18, 2023     Eliquis 5 MG tablet tablet  Generic drug: apixaban  What changed:   how much to take  when to take this   TAKE ONE TABLET BY MOUTH EVERY 12 HOURS      furosemide 40 MG tablet  Commonly known as: LASIX  What changed: how much to take   20 mg, Oral, Daily      pramipexole 1.5 MG tablet  Commonly known as: MIRAPEX  What changed:   when to take this  additional instructions   TAKE ONE TABLET BY MOUTH TWICE A DAY             Continue These Medications        Instructions Start Date   albuterol sulfate  (90 Base) MCG/ACT inhaler  Commonly known as: PROVENTIL HFA;VENTOLIN HFA;PROAIR HFA   1 puff, Inhalation, Every 4 Hours PRN      atorvastatin 40 MG tablet  Commonly known as: LIPITOR   40 mg, Oral, Nightly      gabapentin 100 MG capsule  Commonly known as: NEURONTIN   100 mg, Oral, 3 Times Daily      metoclopramide 5 MG tablet  Commonly known as: Reglan   5 mg, Oral, 3 Times Daily Before Meals      ondansetron ODT 4 MG disintegrating tablet  Commonly known as: ZOFRAN-ODT   4 mg, Translingual, Every 8 Hours PRN      pantoprazole 40 MG EC tablet  Commonly known as: PROTONIX   TAKE ONE TABLET BY MOUTH TWICE A DAY BEFORE A MEAL      PARoxetine 40 MG tablet  Commonly known as: PAXIL   TAKE ONE TABLET BY MOUTH EVERY MORNING      polyethylene glycol 17 g packet  Commonly known as: MIRALAX   17 g, Oral, Daily      potassium chloride 10 MEQ CR capsule  Commonly known as: MICRO-K   10 mEq, Oral, Daily      Spiriva  Respimat 2.5 MCG/ACT aerosol solution inhaler  Generic drug: tiotropium bromide monohydrate   2 puffs, Inhalation, Daily      Stiolto Respimat 2.5-2.5 MCG/ACT aerosol solution inhaler  Generic drug: tiotropium bromide-olodaterol   2.5 puffs, Inhalation, Daily - RT      tamsulosin 0.4 MG capsule 24 hr capsule  Commonly known as: FLOMAX   0.4 mg, Oral, Daily               Allergies   Allergen Reactions    Sulfamethoxazole-Trimethoprim Nausea Only         Discharge Disposition:  Home or Self Care    Diet:  Hospital:  Diet Order   Procedures    Diet: Regular/House Diet; Texture: Regular Texture (IDDSI 7); Fluid Consistency: Thin (IDDSI 0)       Activity:  Activity Instructions       Activity as Tolerated                   CODE STATUS:    Code Status and Medical Interventions:   Ordered at: 11/15/23 0232     Code Status (Patient has no pulse and is not breathing):    CPR (Attempt to Resuscitate)     Medical Interventions (Patient has pulse or is breathing):    Full Support       Future Appointments   Date Time Provider Department Center   11/30/2023  2:45 PM Mulugeta Millan MD [facility] GE EA [facility]   12/27/2023  2:30 PM Brandin Bolton MD [facility] PC  [facility]   1/11/2024 12:00 PM [facility] [place]   1/11/2024 12:20 PM George Phelan II, MD [facility] CBC EAST [place]   3/11/2024  3:15 PM Rubina Powers, PT BH [facility] OPT [facility]   3/25/2024 12:30 PM Darian Maldonado Jr., MD [facility] CD LCGKR [facility]   4/4/2024 10:30 AM TONIE CT 3 BH T[facility] CT [facility]   4/17/2024  1:00 PM Verito Julio APRN [facility] TS [facility]       Additional Instructions for the Follow-ups that You Need to Schedule       Discharge Follow-up with PCP   As directed       Currently Documented PCP:    Brandin Bolton MD    PCP Phone Number:    [phone]     Follow Up Details: Recommend primary care provider follow-up within 1 week for general hospital follow-up.  Please call today to schedule.  Discussed furosemide dosing at follow-up.        Discharge Follow-up with  Specified Provider: [facility] with [facility] as instructed.  Call with any questions; 1 Month   As directed      To: [facility] with [facility] as [facility].  Call with any questions   Follow Up: 1 Month               Contact information for follow-up providers       Brandin Bolton MD .    Specialty: Internal Medicine  Why: Recommend primary care provider follow-up within 1 week for general hospital follow-up.  Please call today to schedule.  Discussed furosemide dosing at follow-up.  Contact information:  [address]  [facility] [address]  [place] [address]  [phone]               Darian Montoya Jr., MD Follow up in 1 week(s).    Specialty: Urology  Why: Follow-up urine retention.  Call today to schedule  Contact information:  [address] [id number] [phone]                       Contact information for after-discharge care       Home Medical Care       [facility] .    Service: Home Health Services  Contact information:  [address] [facility] [address]  [facility] [address] [phone]                                       Enoc Suero MD  11/17/23      Time Spent on Discharge:  I spent greater than 40 minutes on this discharge activity which included: face-to-face encounter with the patient, reviewing the data in the system, coordination of the care with the nursing staff as well as consultants, documentation, and entering orders.

## 2023-11-17 NOTE — DISCHARGE PLACEMENT REQUEST
"Jose Bourgeois (73 y.o. Male)       Date of Birth   1950    Social Security Number       Address   [address] [address]    Home Phone   [phone]    MRN   [id number]       [facility]    Marital Status                               Admission Date   11/14/23    Admission Type   Emergency    Admitting Provider   Enoc Suero MD    Attending Provider   Enoc Suero MD    Department, Room/Bed   [facility] [address], P895/1       Discharge Date       Discharge Disposition   Home or Self Care    Discharge Destination                                 Attending Provider: Enoc Suero MD    Allergies: Sulfamethoxazole-trimethoprim    Isolation: Contact   Infection: ESBL (11/16/23)   Code Status: CPR    Ht: 195.6 cm (77\")   Wt: 77.1 kg (170 lb)    Admission Cmt: None   Principal Problem: Acute UTI [N39.0]                   Active Insurance as of 11/14/2023       Primary Coverage       Payor Plan Insurance Group Employer/Plan Group    HUMANA MEDICARE REPLACEMENT HUMANA MEDICARE REPLACEMENT [id number]       Payor Plan Address Payor Plan Phone Number Payor Plan Fax Number Effective Dates    [address] [address] [phone]  1/1/2018 - None Entered    [facility] [id number]         Subscriber Name Subscriber Birth Date Member ID       JOSE BOURGEOIS 1950 [id number]                     Emergency Contacts        (Rel.) Home Phone Work Phone Mobile Phone    Lena Alvarado (Spouse) [phone] -- [phone]    woodrow danielle (Friend) -- -- [phone]                "

## 2023-11-17 NOTE — PROGRESS NOTES
Patient is discharging today . Spoke to patient and wife who are agreeable to home health and have no current home health. Face sheet information is correct and will transcribe home health orders for SN per verbal order PCP - Kylah/ Dr Brandin Bolton. [facility] will be delivering BRITTON's to the room prior to DC -  ertapenem 1 g daily for total 7-day course through end date 11/23 per infectious disease Dr. EMILY Strong. Please note going home with a lama cathter per Dr Montoya . Also sees Devin Aguilar for a chronic L toe wound - Betadine wet to dry daily. Thank you !

## 2023-11-17 NOTE — CASE MANAGEMENT/SOCIAL WORK
Continued Stay Note  Psychiatric     Patient Name: Jose Hurtado  MRN: [id number]  Today's Date: 11/17/2023    Admit Date: 11/14/2023    Plan: Home w/o needs, [facility] if needed   Discharge Plan       Row Name 11/17/23 1058       Plan    Plan Comments Per ID, patient will need IV Ertapenem 1 gram daily through 11/23. Order in EPIC for midline, Spoke with patient and he would like to use [facility]. Referrals placed in EPIC.                   Discharge Codes    No documentation.                 Expected Discharge Date and Time       Expected Discharge Date Expected Discharge Time    Nov 17, 2023               Reva Arzate RN

## 2023-11-17 NOTE — OUTREACH NOTE
Prep Survey      Flowsheet Row Responses   [facility] patient discharged from? [facility]   Is LACE score < 7 ? No   Eligibility [facility]   Date of Admission 11/14/23   Date of Discharge 11/17/23   Discharge Disposition Home-Health Care Sv   Discharge diagnosis Acute UTI, sepsis   Does the patient have one of the following disease processes/diagnoses(primary or secondary)? Sepsis   Does the patient have Home health ordered? Yes   What is the Home health agency?  [facility]   Is there a DME ordered? Yes   What DME was ordered? [facility] INF[facility]-  IV Ertapenem   Prep survey completed? Yes            Mervat HAN - Registered Nurse

## 2023-11-17 NOTE — SIGNIFICANT NOTE
"   11/17/23 1216   Midline Catheter - Single Lumen 11/17/23 Left Basilic   Placement date: If unknown, DO NOT use \"Add Comment\" note/Placement time: If unknown, DO NOT use \"Add Comment\" note: 11/17/23 1215   Hand Hygiene Completed: Yes  Site Prep: Chlorhexidine  All 5 Sterile Barriers Used (Gloves, Gown, Cap, Mask, Large Octavio...   Site Assessment Clean;Dry;Intact   Line Status Blood return noted;Capped;Flushed;Saline locked   Length arely (cm) 10 cm   Extremity Circumference (cm) 27 cm   Dressing Type Border Dressing;Securing device;Antimicrobial dressing/disc   Dressing Status Clean;Dry;Intact   Dressing Intervention New dressing   Dressing Change Due 11/24/23   Indication/Daily Review of Necessity other (see comments)  (home IVabx till 11/23)     Sharp Count correct 3 needles, 2 guidewires, and 1 scalpel  "

## 2023-11-17 NOTE — THERAPY TREATMENT NOTE
Patient Name: Jose Hurtado  : 1950    MRN: [id number]                              Today's Date: 2023       Admit Date: 2023    Visit Dx:     ICD-10-CM ICD-9-CM   1. Acute UTI  N39.0 599.0   2. Fever, unspecified fever cause  R50.9 780.60   3. Chronic anemia  D64.9 285.9   4. Chronic renal impairment, unspecified CKD stage  N18.9 585.9   5. Anticoagulated  Z79.01 V58.61     Patient Active Problem List   Diagnosis    History of esophageal cancer    Adenomatous polyp of colon    Malignant neoplasm of prostate    RLS (restless legs syndrome)    History of DVT (deep vein thrombosis)    Recurrent major depressive disorder, in partial remission    Hypertension    Anemia    Insomnia due to other mental disorder    Peripheral polyneuropathy    B12 deficiency    Lymphedema    Iron deficiency    Gastroparesis    History of recurrent deep vein thrombosis (DVT)    Tremor of both hands    Gastrointestinal hemorrhage    Acute blood loss anemia    Pancytopenia    Chronic venous hypertension due to DVT    Bleeding hemorrhoid    Malabsorption of iron    Iron deficiency anemia    History of esophageal cancer    Abnormal CT scan    Generalized weakness    Chronic anticoagulation    Infection due to ESBL-producing Escherichia coli    CKD (chronic kidney disease) stage 2, GFR 60-89 ml/min    NSTEMI (non-ST elevated myocardial infarction)    Bronchitis    Dyspnea    Elevated troponin    Atrial fibrillation    Chronic obstructive pulmonary disease    Congestive heart failure    Gait instability    Dark stools    Proctitis    Acute urinary retention    Severe malnutrition    Other symptoms and signs concerning food and fluid intake    Weight loss    Dehydration    Acute UTI    ALEC (acute kidney injury)    Sepsis    Bilateral hydronephrosis    Scarring of lung    Wound of left great toe    Bacteremia due to Escherichia coli    Moderate malnutrition     Past Medical History:   Diagnosis Date    Acute deep vein thrombosis  (DVT) of popliteal vein of left lower extremity 03/24/2020    Anemia     Anti-phospholipid syndrome     On lifelong anticoagulation therapy    Bladder disorder     LEAKAGE   ON MED  wers pads    Bleeding hemorrhoids     Chronic kidney disease     Community acquired pneumonia     HISTORY OF IN 2014    Congestive heart failure     COPD (chronic obstructive pulmonary disease)     Deep venous thrombosis 2006, 2008    Left lower extremity multiple    Depression     Esophageal carcinoma 12/31/2014    had chemo and radiation prior surgery    Gastroparesis     Hemorrhoids     HH (hiatus hernia)     History of atrial fibrillation 2015    ONE EPISODE WHILE HOSPITALIZED    History of kidney stones     History of nephrolithiasis     History of pancreatitis     PT STATES MANY YEARS AGO    History of radiation therapy     History of transfusion     Hypertension     Long-term (current) use of anticoagulants, INR goal 2.0-3.0     Lymphedema     Malignant neoplasm of prostate     Other hyperlipidemia 01/30/2018 January 30, 2018 lipid panel risk 12.8%    Restless legs syndrome     Sleep apnea     OCCASIONALLY WEARS CPAP    Squamous carcinoma     on the head     Past Surgical History:   Procedure Laterality Date    APPENDECTOMY  1950    BRONCHOSCOPY      (Diagnostic)    CARDIAC CATHETERIZATION N/A 05/14/2022    Procedure: Left Heart Cath;  Surgeon: Eric So MD;  Location:  [facility];  Service: Cardiology;  Laterality: N/A;    CARDIAC CATHETERIZATION N/A 05/14/2022    Procedure: Coronary angiography;  Surgeon: Eric So MD;  Location:  [facility];  Service: Cardiology;  Laterality: N/A;    CARDIAC CATHETERIZATION N/A 05/14/2022    Procedure: Left ventriculography;  Surgeon: Eric So MD;  Location:  [facility];  Service: Cardiology;  Laterality: N/A;    CATARACT EXTRACTION Bilateral 2014    COLONOSCOPY  12/15/2014    Complete /  Description: EH, IH, torts, stool, follow-up colonoscopy due in 5 years.    COLONOSCOPY N/A 06/13/2017    non-thrombosed external hemorrhoids, normal examined ileum, IH    COLONOSCOPY N/A 02/26/2019    Procedure: COLONOSCOPY with Cold Polypectomy;  Surgeon: Mulugeta Millan MD;  Location: [facility] ENDOSCOPY;  Service: Gastroenterology    COLONOSCOPY N/A 12/16/2020    Procedure: COLONOSCOPY to cecum into TI;  Surgeon: Mesha Sanchez MD;  Location: [facility] ENDOSCOPY;  Service: Gastroenterology;  Laterality: N/A;  pre: lower GI bleed  post: hemorrhoids     CYSTOSCOPY W/ LASER LITHOTRIPSY      ENDOSCOPY N/A 06/13/2017    Procedure: ESOPHAGOGASTRODUODENOSCOPY WITH COLD BIOPSY;  Surgeon: Lake Gonzalez MD;  Location: [facility] ENDOSCOPY;  Service:     ENDOSCOPY N/A 11/18/2021    Procedure: ESOPHAGOGASTRODUODENOSCOPY WITH  DILATATION WITH FLUOROSCOPY;  Surgeon: Jose Christine III, MD;  Location: [facility] ENDOSCOPY;  Service: Gastroenterology;  Laterality: N/A;  Pre: dysphagia, h/x of adinocarcinoma of esophagus  Post: same    ENDOSCOPY N/A 08/07/2023    Procedure: ESOPHAGOGASTRODUODENOSCOPY;  Surgeon: Jameel Valencia MD;  Location: [facility] ENDOSCOPY;  Service: Gastroenterology;  Laterality: N/A;  PRE- DYSPHAGIA  POST-ABORTED DUE TO RETAINED FOOD    ENDOSCOPY N/A 08/08/2023    Procedure: ESOPHAGOGASTRODUODENOSCOPY with bx;  Surgeon: Jameel Valencia MD;  Location: [facility] ENDOSCOPY;  Service: Gastroenterology;  Laterality: N/A;  pre: N/V, abd pain  post: esophageal nodule, retained food    ESOPHAGECTOMY      April 2015, stage IIB esophageal carcinoma, sub-total resection.    ESOPHAGECTOMY      Esophagectomy Subtotal Murphy Joe Procedure    EXCISION LESION  08/2012    Removal of Squamous Cell CA on Head    HAMMER TOE REPAIR  09/2014    Hammertoe Operation (Each Toe), 10/2014    HAMMER TOE REPAIR Left 10/03/2017    Procedure: Left second third and fourth distal interphalangeal joint resection with flexor tenotomy;   Surgeon: Mulugeta Lira MD;  Location:  [facility];  Service:     HEMORRHOIDECTOMY N/A 12/23/2020    Procedure: HEMORRHOIDECTOMY;  Surgeon: Billy Julio Jr., MD;  Location: [facility] MAIN OR;  Service: General;  Laterality: N/A;    HERNIA REPAIR      incisional    JEJUNOSTOMY      Laparoscopic    JEJUNOSTOMY      tube removal     KNEE SURGERY Bilateral 1967, 1973, 1981    PATELLA SURGERY Left     removed    PILONIDAL CYST / SINUS EXCISION      DC ARTHRP KNE CONDYLE&PLATU MEDIAL&LAT COMPARTMENTS Right 03/26/2018    Procedure: TOTAL KNEE ARTHROPLASTY;  Surgeon: Renny Solis MD;  Location: [facility] MAIN OR;  Service: Orthopedics    PROSTATECTOMY  2010    PYLOROPLASTY      SIGMOIDOSCOPY N/A 08/02/2023    Procedure: SIGMOIDOSCOPY FLEXIBLE;  Surgeon: Mesha Sanchez MD;  Location: [facility] ENDOSCOPY;  Service: Gastroenterology;  Laterality: N/A;  PRE- ABNORMAL CT  POST- HEMORRHOIDS, ULCERATION    SPINAL FUSION  02/1998    C 5,6    TONSILLECTOMY      UPPER GASTROINTESTINAL ENDOSCOPY  12/15/2014    LA Grade D esophagitis, Pardo's, HH, multiple duodenal ulcers    VENTRAL/INCISIONAL HERNIA REPAIR N/A 04/14/2016    Procedure: VENTRAL/INCISIONAL HERNIA REPAIR, open, with mesh, and component separation;  Surgeon: Darren Rivas MD;  Location: [facility] OR;  Service:       General Information       Row Name 11/17/23 1018          Physical Therapy Time and Intention    Document Type therapy note (daily note)  -AR     Mode of Treatment physical therapy  -AR       Row Name 11/17/23 1018          General Information    Patient Profile Reviewed yes  -AR     Existing Precautions/Restrictions fall  -AR       Row Name 11/17/23 1018          Cognition    Orientation Status (Cognition) oriented x 4  -AR               User Key  (r) = Recorded By, (t) = Taken By, (c) = Cosigned By      Initials Name Provider Type    AR Ines Mcduffie PT Physical Therapist                   Mobility       Row Name 11/17/23 1018           Bed Mobility    Bed Mobility supine-sit  -AR     Supine-Sit Lucas (Bed Mobility) standby assist  -AR     Sit-Supine Lucas (Bed Mobility) not tested  -AR       Row Name 11/17/23 1018          Sit-Stand Transfer    Sit-Stand Lucas (Transfers) contact guard  -AR     Assistive Device (Sit-Stand Transfers) walker, front-wheeled  -AR       Row Name 11/17/23 1018          Gait/Stairs (Locomotion)    Lucas Level (Gait) contact guard  -AR     Assistive Device (Gait) walker, front-wheeled  -AR     Distance in Feet (Gait) 50' declined further distance 2/2 fatigue  -AR     Deviations/Abnormal Patterns (Gait) festinating/shuffling;jf decreased  -AR     Bilateral Gait Deviations heel strike decreased;forward flexed posture  -AR               User Key  (r) = Recorded By, (t) = Taken By, (c) = Cosigned By      Initials Name Provider Type    Ines Torrez, PT Physical Therapist                   Obj/Interventions       Row Name 11/17/23 1018          Motor Skills    Therapeutic Exercise --  B Ap, LAQ, sitting marches 20x  -AR               User Key  (r) = Recorded By, (t) = Taken By, (c) = Cosigned By      Initials Name Provider Type    Ines Torrez, PT Physical Therapist                   Goals/Plan    No documentation.                  Clinical Impression       Row Name 11/17/23 1018          Pain    Pretreatment Pain Rating 0/10 - no pain  -AR     Posttreatment Pain Rating 0/10 - no pain  -AR     Pain Intervention(s) Repositioned  -AR       Row Name 11/17/23 1018          Plan of Care Review    Outcome Evaluation Declined activity tolerance during PT today.  Able toa mbulate 50' w/ contact assist using RW, pt declined further distance 2/2 fatigue.  Able to perform sitting exercises w/ ed for HEP.  Recommend home w/ home PT, pt reports owning a RW.  -AR       Row Name 11/17/23 1018          Therapy Assessment/Plan (PT)    Rehab Potential (PT) good, to achieve stated therapy goals   -AR     Therapy Frequency (PT) 5 times/wk  -AR       Row Name 11/17/23 1018          Vital Signs    O2 Delivery Pre Treatment room air  -AR       Row Name 11/17/23 1018          Positioning and Restraints    Pre-Treatment Position in bed  -AR     Post Treatment Position chair  -AR     In Chair notified nsg;reclined;sitting;call light within reach;encouraged to call for assist;exit alarm on  -AR               User Key  (r) = Recorded By, (t) = Taken By, (c) = Cosigned By      Initials Name Provider Type    Ines Torrez, PT Physical Therapist                   Outcome Measures       Row Name 11/17/23 1020          How much help from another person do you currently need...    Turning from your back to your side while in flat bed without using bedrails? 4  -AR     Moving from lying on back to sitting on the side of a flat bed without bedrails? 4  -AR     Moving to and from a bed to a chair (including a wheelchair)? 3  -AR     Standing up from a chair using your arms (e.g., wheelchair, bedside chair)? 3  -AR     Climbing 3-5 steps with a railing? 2  -AR     To walk in hospital room? 3  -AR     AM-PAC 6 Clicks Score (PT) 19  -AR     Highest Level of Mobility Goal 6 --> Walk 10 steps or more  -AR       Row Name 11/17/23 1020          Functional Assessment    Outcome Measure Options AM-PAC 6 Clicks Basic Mobility (PT)  -AR               User Key  (r) = Recorded By, (t) = Taken By, (c) = Cosigned By      Initials Name Provider Type    Ines Torrez, PT Physical Therapist                                 Physical Therapy Education       Title: PT OT SLP Therapies (Done)       Topic: Physical Therapy (Done)       Point: Mobility training (Done)       Learning Progress Summary             Patient Acceptance, E, VU by AR at 11/17/2023 1020    Acceptance, E,TB, VU,NR by BM at 11/16/2023 0902                         Point: Home exercise program (Done)       Learning Progress Summary             Patient Acceptance, E,  VU by AR at 11/17/2023 1020    Acceptance, E,TB, VU,NR by  at 11/16/2023 0902                         Point: Body mechanics (Done)       Learning Progress Summary             Patient Acceptance, E, VU by AR at 11/17/2023 1020    Acceptance, E,TB, VU,NR by  at 11/16/2023 0902                         Point: Precautions (Done)       Learning Progress Summary             Patient Acceptance, E, VU by AR at 11/17/2023 1020    Acceptance, E,TB, VU,NR by  at 11/16/2023 0902                                         User Key       Initials Effective Dates Name Provider Type Discipline    AR 06/16/21 -  Ines Mcduffie, PT Physical Therapist PT    BM 10/12/23 -  Aayush Coppola, MANDA Student PT Student PT                  PT Recommendation and Plan     Outcome Evaluation: Declined activity tolerance during PT today.  Able toa mbulate 50' w/ contact assist using RW, pt declined further distance 2/2 fatigue.  Able to perform sitting exercises w/ ed for HEP.  Recommend home w/ home PT, pt reports owning a RW.     Time Calculation:         PT Charges       Row Name 11/17/23 1017             Time Calculation    Start Time 0910  -AR      Stop Time 0925  -AR      Time Calculation (min) 15 min  -AR      PT Received On 11/17/23  -AR      PT - Next Appointment 11/19/23  -AR                User Key  (r) = Recorded By, (t) = Taken By, (c) = Cosigned By      Initials Name Provider Type    AR Ines Mcduffie, PT Physical Therapist                  Therapy Charges for Today       Code Description Service Date Service Provider Modifiers Qty    [id number] HC PT THER PROC EA 15 MIN 11/17/2023 Ines Mcduffie, PT GP 1            PT G-Codes  Outcome Measure Options: AM-PAC 6 Clicks Basic Mobility (PT)  AM-PAC 6 Clicks Score (PT): 19  PT Discharge Summary  Anticipated Discharge Disposition (PT): home with home health    Ines Mcduffie PT  11/17/2023

## 2023-11-17 NOTE — TELEPHONE ENCOUNTER
Caller: [facility]    Relationship: Home Health    Best call back number: [phone] : ROSALIND    What orders are you requesting (i.e. lab or imaging): VERBAL ORDERS TO TEACH IV ANTIBIOTICS AND POLLARD CATHETER CARE   Hospital Medicine Discharge Summary    Patient ID: Opal Wooten      Patient's PCP: Herminia Frank DO    Admit Date: 7/13/2020     Discharge Date:  7/14/2020      Admitting Physician: Gretta Howard MD     Discharge Physician: Benjie Ceballos MD      Condition at discharge: Stable    Disposition: Home    Discharge Diagnoses: Active Hospital Problems    Diagnosis Date Noted    Uterine fibroid [D25.9] 07/13/2020   Abdominal pain  Menorrhagia  Status post IR uterine fibroid embolization    The patient was seen and examined on day of discharge and this discharge summary is in conjunction with any daily progress note from day of discharge. Hospital Course:   Ms. Opal Wooten, a 44y.o. year old female  who  has a past medical history of Arthritis, Vallecillo disease, Depression, and GERD (gastroesophageal reflux disease). Patient is a 26-year-old who was admitted to the hospital after undergoing IR uterine fibroid embolization for observation. She has history of menorrhagia and she initially went to IR for embolization procedure. Postoperatively she had pain and so she was admitted here. Denies any headache, dizziness, abdominal pain today. Chart reviewed, overnight events reviewed. No bleeding noted. She is eager to go home and she was recommended to follow-up with her primary care physician as outpatient. Consults:     None    Significant Diagnostic Studies:  As above      Discharge Instructions/Follow-up:  As above       Activity: activity as tolerated    Physical Exam:  Vitals:    07/14/20 0745   BP: (!) 157/85   Pulse: 77   Resp: 16   Temp: 97.1 °F (36.2 °C)   SpO2: 98%       General appearance: No apparent distress, appears stated age and cooperative. Respiratory:  Clear to auscultation, good air entry. Cardiovascular: RRR, no murmur. Abdomen: Soft, non-tender, non-distended with normal bowel sounds. Neurologic: awake, alert and following commands     Labs:  For convenience and continuity at follow-up the following most recent labs are provided:      CBC:    Lab Results   Component Value Date    WBC 12.9 07/14/2020    HGB 10.7 07/14/2020    HCT 33.1 07/14/2020     07/14/2020       Renal:    Lab Results   Component Value Date     07/14/2020    K 3.7 07/14/2020     07/14/2020    CO2 24 07/14/2020    BUN 6 07/14/2020    CREATININE 0.6 07/14/2020    CALCIUM 9.0 07/14/2020         Discharge Medications:     Discharge Medication List as of 7/14/2020 11:36 AM           Details   oxyCODONE (ROXICODONE) 5 MG immediate release tablet Take 1 tablet by mouth every 6 hours as needed for Pain for up to 3 days. , Disp-10 tablet,R-0Print              Details   ondansetron (ZOFRAN) 4 MG tablet Take 1 tablet by mouth every 8 hours as needed for Nausea or Vomiting, Disp-30 tablet,R-0Normal              Details   vitamin B-12 (CYANOCOBALAMIN) 500 MCG tablet Take 500 mcg by mouth Daily with lunchHistorical Med      vitamin D (CHOLECALCIFEROL) 1000 UNIT TABS tablet Take 1,000 Units by mouth Daily with lunchHistorical Med      indomethacin (INDOCIN) 25 MG capsule Take 25 mg by mouth 3 times daily (with meals)Historical Med      acetaminophen (TYLENOL) 325 MG tablet Take 650 mg by mouth every 6 hours as needed for PainHistorical Med      dexlansoprazole (DEXILANT) 60 MG CPDR capsule Take 60 mg by mouth every morning Historical Med      mesalamine (LIALDA) 1.2 GM EC tablet Take 4,800 mg by mouth daily (with breakfast) Historical Med                 Signed:    Nargis Roach MD   7/14/2020      Thank you Pauly Esparza DO for the opportunity to be involved in this patient's care. If you have any questions or concerns please feel free to contact me. NOTE: This report was transcribed using voice recognition software. Every effort was made to ensure accuracy; however, inadvertent computerized transcription errors may be present.

## 2023-11-18 ENCOUNTER — HOME CARE VISIT (OUTPATIENT)
Dept: HOME HEALTH SERVICES | Facility: HOME HEALTHCARE | Age: 73
End: 2023-11-18
Payer: MEDICARE

## 2023-11-18 LAB
BACTERIA SPEC AEROBE CULT: ABNORMAL
GRAM STN SPEC: ABNORMAL
ISOLATED FROM: ABNORMAL

## 2023-11-18 PROCEDURE — G0299 HHS/HOSPICE OF RN EA 15 MIN: HCPCS

## 2023-11-18 NOTE — Clinical Note
Dear Dr Che Borges   41    The above named patient was admitted into home health services as of 23.  We will be doing nursing visits for teaching on medications, disease management, IV therapy teaching, wound care, and assessment of all systems.  If you have any questions or needs on this patient, please feel free to contact our office at [phone].     Thank you,     Shaina BELLN RN   [facility]

## 2023-11-19 ENCOUNTER — HOME CARE VISIT (OUTPATIENT)
Dept: HOME HEALTH SERVICES | Facility: HOME HEALTHCARE | Age: 73
End: 2023-11-19
Payer: MEDICARE

## 2023-11-19 LAB
BACTERIA SPEC AEROBE CULT: ABNORMAL
GRAM STN SPEC: ABNORMAL
GRAM STN SPEC: ABNORMAL
ISOLATED FROM: ABNORMAL

## 2023-11-19 PROCEDURE — G0299 HHS/HOSPICE OF RN EA 15 MIN: HCPCS

## 2023-11-19 NOTE — Clinical Note
Dr Bolton,     Re Jose Genesis   46    Mr Hurtado has a stage 2 pressure ulcer on his spine.  Would it be ok with you for us to begin treating this wound while he has home health services.  I would like to clean it with wound wash, apply therahoney to wound bed, then cover with a bordered foam dressing?      Thank you so much,     Shaina BELLN RN   [facility]

## 2023-11-20 ENCOUNTER — TRANSITIONAL CARE MANAGEMENT TELEPHONE ENCOUNTER (OUTPATIENT)
Dept: CALL CENTER | Facility: HOSPITAL | Age: 73
End: 2023-11-20
Payer: MEDICARE

## 2023-11-20 NOTE — OUTREACH NOTE
Call Center TCM Note      Flowsheet Row Responses   [facility] patient discharged from? [place]   Does the patient have one of the following disease processes/diagnoses(primary or secondary)? Sepsis   TCM attempt successful? No   Unsuccessful attempts Attempt 2            Ismael Newberry RN    11/20/2023, 12:41 EST

## 2023-11-20 NOTE — CASE MANAGEMENT/SOCIAL WORK
Case Management Discharge Note      Final Note: dc'd home with [facility]    Provided Post Acute Provider List?: Refused  Refused Provider List Comment: per pt's wife, do not anticipate the need for SNF / HH    Selected Continued Care - Discharged on 11/17/2023 Admission date: 11/14/2023 - Discharge disposition: Home or Self Care      Destination    No services have been selected for the patient.                Durable Medical Equipment    No services have been selected for the patient.                Dialysis/Infusion Coordination complete.      Service Provider Selected Services Address Phone Fax Patient Preferred    [facility] [address] [address] [phone] --              Home Medical Care Coordination complete.      Service Provider Selected Services Address Phone Fax Patient Preferred    [facility] [address] [address] [phone] --              Therapy    No services have been selected for the patient.                Community Resources    No services have been selected for the patient.                [facility]    No services have been selected for the patient.                    Transportation Services  Private: Car    Final Discharge Disposition Code: 06 - home with home health care

## 2023-11-20 NOTE — OUTREACH NOTE
Call Center TCM Note      Flowsheet Row Responses   [facility] patient discharged from? [place]   Does the patient have one of the following disease processes/diagnoses(primary or secondary)? Sepsis   TCM attempt successful? No   Unsuccessful attempts Attempt 1   Call Status Left message            Ismael Newberry RN    11/20/2023, 12:25 EST

## 2023-11-21 ENCOUNTER — TRANSITIONAL CARE MANAGEMENT TELEPHONE ENCOUNTER (OUTPATIENT)
Dept: CALL CENTER | Facility: HOSPITAL | Age: 73
End: 2023-11-21
Payer: MEDICARE

## 2023-11-21 ENCOUNTER — HOME CARE VISIT (OUTPATIENT)
Dept: HOME HEALTH SERVICES | Facility: HOME HEALTHCARE | Age: 73
End: 2023-11-21
Payer: MEDICARE

## 2023-11-21 VITALS
SYSTOLIC BLOOD PRESSURE: 128 MMHG | HEART RATE: 88 BPM | RESPIRATION RATE: 18 BRPM | DIASTOLIC BLOOD PRESSURE: 68 MMHG | TEMPERATURE: 97.1 F | OXYGEN SATURATION: 96 %

## 2023-11-21 LAB
BACTERIA SPEC AEROBE CULT: NORMAL
BACTERIA SPEC AEROBE CULT: NORMAL

## 2023-11-21 PROCEDURE — G0299 HHS/HOSPICE OF RN EA 15 MIN: HCPCS

## 2023-11-21 NOTE — HOME HEALTH
SOC summary    Primary diagnoses/co-morbidities/recent procedures in past 60 days that impact current episode: UTI    Current level of functional ability: ambulate with walker and supervision    Homebound status and living arrangements: considerable effort to leave home due to weakness, must use walker at all times.  Lives in patio home with spouse and 2 dogs in home.     Skilled need:  SN needed for IV therapy teaching and assessment, medication teaching, dz mgmt teaching, wound care and teaching.     Focus of care for next 60 days for each discipline ordered: Urinary tract infection, site not specified [N39.0]  Skin integrity/wound status: has one open area on back and a wound on left great toe.  We have wound orders for toe, will need to obtain orders for back wound     Code status: has Advance directives on file at hosp     Most recent fall risk: High     Estimated date when home care services will end 1/16/24    Plan of Care confirmed with PCP on 11/19/23 via in basket

## 2023-11-21 NOTE — OUTREACH NOTE
Call Center TCM Note      Flowsheet Row Responses   [facility] patient discharged from? [place]   Does the patient have one of the following disease processes/diagnoses(primary or secondary)? Sepsis   TCM attempt successful? No   Unsuccessful attempts Attempt 3            Ismael Newberry RN    11/21/2023, 09:01 EST

## 2023-11-22 VITALS
TEMPERATURE: 97.5 F | RESPIRATION RATE: 18 BRPM | DIASTOLIC BLOOD PRESSURE: 62 MMHG | HEART RATE: 80 BPM | SYSTOLIC BLOOD PRESSURE: 120 MMHG

## 2023-11-23 VITALS
RESPIRATION RATE: 18 BRPM | OXYGEN SATURATION: 97 % | HEART RATE: 78 BPM | SYSTOLIC BLOOD PRESSURE: 123 MMHG | TEMPERATURE: 97.1 F | DIASTOLIC BLOOD PRESSURE: 70 MMHG

## 2023-11-23 NOTE — HOME HEALTH
PLAN FOR NEXT VISIT:  IV THERAPY TEACHING ON TUBING CHANGE, PICC LINE DRESSING CHANGE IF NEEDED, LABS IF ADDED ON, WOUND CARE

## 2023-11-23 NOTE — HOME HEALTH
CP and general assessment completed.  Vss, afebrile.  Patient states he is feeling better.  pcg wife present and states she needs some help with changing the   iv tubing.  Patient is to finish IV Ertepenem Thursday 11/23/23 via NENO s/l midline.  Sn instructed and performed changing IV tubing for pcg today.  Instructed that she will not need to change tubing again since patient will be   completed 11/23.  Pcg independent with flushing line, preparing antibiotic, changing IV antibiotic daily.  Patient states he will be following up with Dr. Montoya at [facility] for voiding trial.      NEXT SN VISIT: cp assess, remove NENO midline.

## 2023-11-24 ENCOUNTER — HOME CARE VISIT (OUTPATIENT)
Dept: HOME HEALTH SERVICES | Facility: HOME HEALTHCARE | Age: 73
End: 2023-11-24
Payer: MEDICARE

## 2023-11-24 PROCEDURE — G0299 HHS/HOSPICE OF RN EA 15 MIN: HCPCS

## 2023-11-25 VITALS
OXYGEN SATURATION: 95 % | RESPIRATION RATE: 18 BRPM | SYSTOLIC BLOOD PRESSURE: 126 MMHG | HEART RATE: 81 BPM | TEMPERATURE: 97.8 F | DIASTOLIC BLOOD PRESSURE: 74 MMHG

## 2023-11-28 ENCOUNTER — HOME CARE VISIT (OUTPATIENT)
Dept: HOME HEALTH SERVICES | Facility: HOME HEALTHCARE | Age: 73
End: 2023-11-28
Payer: MEDICARE

## 2023-11-28 VITALS
TEMPERATURE: 97.9 F | HEART RATE: 78 BPM | DIASTOLIC BLOOD PRESSURE: 60 MMHG | RESPIRATION RATE: 18 BRPM | SYSTOLIC BLOOD PRESSURE: 122 MMHG | OXYGEN SATURATION: 93 %

## 2023-11-28 PROCEDURE — G0299 HHS/HOSPICE OF RN EA 15 MIN: HCPCS

## 2023-11-30 ENCOUNTER — OFFICE VISIT (OUTPATIENT)
Dept: GASTROENTEROLOGY | Facility: CLINIC | Age: 73
End: 2023-11-30
Payer: MEDICARE

## 2023-11-30 VITALS
DIASTOLIC BLOOD PRESSURE: 76 MMHG | OXYGEN SATURATION: 98 % | WEIGHT: 188.4 LBS | BODY MASS INDEX: 22.25 KG/M2 | HEART RATE: 73 BPM | HEIGHT: 77 IN | SYSTOLIC BLOOD PRESSURE: 144 MMHG | TEMPERATURE: 97.5 F

## 2023-11-30 DIAGNOSIS — K31.84 GASTROPARESIS: Primary | ICD-10-CM

## 2023-11-30 DIAGNOSIS — Z85.01 PERSONAL HISTORY OF ESOPHAGEAL CANCER: ICD-10-CM

## 2023-11-30 PROCEDURE — 1160F RVW MEDS BY RX/DR IN RCRD: CPT | Performed by: INTERNAL MEDICINE

## 2023-11-30 PROCEDURE — 3078F DIAST BP <80 MM HG: CPT | Performed by: INTERNAL MEDICINE

## 2023-11-30 PROCEDURE — 99213 OFFICE O/P EST LOW 20 MIN: CPT | Performed by: INTERNAL MEDICINE

## 2023-11-30 PROCEDURE — 1159F MED LIST DOCD IN RCRD: CPT | Performed by: INTERNAL MEDICINE

## 2023-11-30 PROCEDURE — 3077F SYST BP >= 140 MM HG: CPT | Performed by: INTERNAL MEDICINE

## 2023-11-30 RX ORDER — ERTAPENEM 1 G/1
INJECTION, POWDER, LYOPHILIZED, FOR SOLUTION INTRAMUSCULAR; INTRAVENOUS
COMMUNITY
Start: 2023-11-17

## 2023-11-30 NOTE — PROGRESS NOTES
Chief Complaint   Patient presents with    Gastroparesis        Jose Hurtado is a  73 y.o. male here for a follow up visit for esophageal cancer, gastroparesis    HPI this 73-year-old white male patient of Dr. Brandin Bolotn presents in follow-up since his last office visit September 28 of this year.  At that time we had discussed his issues with delayed gastric emptying that were presumed secondary to his previous esophagectomy with gastric pull-through.  We discussed dietary adjustment as well as possible adjustment of his prokinetic agent.  He is currently taking Reglan 5 mg twice daily and has continued that regimen.  He states now his symptoms are completely abated and his appetite has improved.  He does have a weight of 188 pounds which is a significant improvement since he was last tested at 170 pounds earlier this month.  He continues to be monitored by the thoracic service.  I advised he continue his low residue diet and use of metoclopramide with a follow-up in this office in 6 months time.  He is amenable to that.    Past Medical History:   Diagnosis Date    Acute deep vein thrombosis (DVT) of popliteal vein of left lower extremity 03/24/2020    Anemia     Anti-phospholipid syndrome     On lifelong anticoagulation therapy    Bladder disorder     LEAKAGE   ON MED  wers pads    Bleeding hemorrhoids     Chronic kidney disease     Community acquired pneumonia     HISTORY OF IN 2014    Congestive heart failure     COPD (chronic obstructive pulmonary disease)     Deep venous thrombosis 2006, 2008    Left lower extremity multiple    Depression     Esophageal carcinoma 12/31/2014    had chemo and radiation prior surgery    Gastroparesis     Hemorrhoids     HH (hiatus hernia)     History of atrial fibrillation 2015    ONE EPISODE WHILE HOSPITALIZED    History of kidney stones     History of nephrolithiasis     History of pancreatitis     PT STATES MANY YEARS AGO    History of radiation therapy     History of  transfusion     Hypertension     Long-term (current) use of anticoagulants, INR goal 2.0-3.0     Lymphedema     Malignant neoplasm of prostate     Other hyperlipidemia 01/30/2018 January 30, 2018 lipid panel risk 12.8%    Restless legs syndrome     Sleep apnea     OCCASIONALLY WEARS CPAP    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      albuterol sulfate  (90 Base) MCG/ACT inhaler Inhale 1 puff Every 4 (Four) Hours As Needed for Wheezing.      atorvastatin (LIPITOR) 40 MG tablet Take 1 tablet by mouth Every Night. 90 tablet 3    cyanocobalamin 1000 MCG/ML injection INJECT 1 ML INTRAMUSCULARLY ONCE EVERY 30 DAYS AS DIRECTED 1 mL 11    diphenhydrAMINE (BENADRYL) 25 MG tablet Take 2 tablets by mouth At Night As Needed for Itching. Indications: Skin Reaction due to an Allergy      Eliquis 5 MG tablet tablet TAKE ONE TABLET BY MOUTH EVERY 12 HOURS (Patient taking differently: Take 1 tablet by mouth Every 12 (Twelve) Hours. Indications: Atrial Fibrillation) 60 tablet 1    EPINEPHrine, Anaphylaxis, (ADRENALIN) 1 MG/ML injection Inject 0.3 mL into the appropriate muscle as directed by prescriber As Needed for Anaphylaxis. Indications: Life-Threatening Hypersensitivity Reaction      ertapenem (INVanz) 1 g injection       furosemide (LASIX) 40 MG tablet Take 0.5 tablets by mouth Daily. Indications: Cardiac Failure, Edema, -      gabapentin (NEURONTIN) 100 MG capsule Take 1 capsule by mouth 3 (Three) Times a Day. 90 capsule 0    metoclopramide (Reglan) 5 MG tablet Take 1 tablet by mouth 3 (Three) Times a Day Before Meals. 90 tablet 1    ondansetron ODT (ZOFRAN-ODT) 4 MG disintegrating tablet Place 1 tablet on the tongue Every 8 (Eight) Hours As Needed for Nausea or Vomiting. 12 tablet 0    pantoprazole (PROTONIX) 40 MG EC tablet  "TAKE ONE TABLET BY MOUTH TWICE A DAY BEFORE A MEAL 180 tablet 3    PARoxetine (PAXIL) 40 MG tablet TAKE ONE TABLET BY MOUTH EVERY MORNING 30 tablet 5    polyethylene glycol (MIRALAX) 17 g packet Take 17 g by mouth Daily. 30 each 0    potassium chloride (MICRO-K) 10 MEQ CR capsule Take 1 capsule by mouth Daily. 90 capsule 5    pramipexole (MIRAPEX) 1.5 MG tablet TAKE ONE TABLET BY MOUTH TWICE A DAY (Patient taking differently: Take 1 tablet by mouth Every Night. PT TAKES TWO TABLETS NIGHTLY   Indications: Restless Leg Syndrome) 180 tablet 1    tamsulosin (FLOMAX) 0.4 MG capsule 24 hr capsule Take 1 capsule by mouth Daily. 30 capsule 0    tiotropium bromide monohydrate (Spiriva Respimat) 2.5 MCG/ACT aerosol solution inhaler Inhale 2 puffs Daily. 4 g 2    tiotropium bromide-olodaterol (Stiolto Respimat) 2.5-2.5 MCG/ACT aerosol solution inhaler Inhale 2.5 puffs Daily. Indications: Chronic Bronchitis, Chronic Obstructive Lung Disease      vitamin B-12 (CYANOCOBALAMIN) 500 MCG tablet tablet Take 1 tablet by mouth Daily. 90 tablet 0     No current facility-administered medications for this visit.       PRN Meds:.    Allergies   Allergen Reactions    Sulfamethoxazole-Trimethoprim Nausea Only       Social History     Socioeconomic History    Marital status:      Spouse name: Lena    Number of children: 0    Years of education: College   Tobacco Use    Smoking status: Former     Packs/day: 2.00     Years: 3.00     Additional pack years: 0.00     Total pack years: 6.00     Types: Cigarettes     Quit date:      Years since quittin.9    Smokeless tobacco: Former     Types: Chew    Tobacco comments:     Caffeine - coffee and soda    Vaping Use    Vaping Use: Never used   Substance and Sexual Activity    Alcohol use: Yes     Alcohol/week: 3.0 standard drinks of alcohol     Types: 1 Glasses of wine, 1 Cans of beer, 1 Shots of liquor per week     Comment: 2    Drug use: Never     Comment: hx marijuana use \"a long " "time ago\"    Sexual activity: Defer     Partners: Male       Family History   Problem Relation Age of Onset    Cerebral aneurysm Mother         cerebral artery aneurysm ( age 56)    Anxiety disorder Father     Suicide Attempts Father          of suicide    Cancer Father         bladder    Prostate cancer Brother 68    No Known Problems Brother     Pancreatic cancer Nephew     Colon cancer Neg Hx     Esophageal cancer Neg Hx     Dementia Neg Hx     Malig Hyperthermia Neg Hx        Review of Systems   Constitutional:  Negative for activity change, appetite change, fatigue and unexpected weight change.   HENT:  Positive for hearing loss. Negative for congestion, facial swelling, sore throat, trouble swallowing and voice change.    Eyes:  Negative for photophobia and visual disturbance.   Respiratory:  Negative for cough and choking.    Cardiovascular:  Negative for chest pain.   Gastrointestinal:  Negative for abdominal distention, abdominal pain, anal bleeding, blood in stool, constipation, diarrhea, nausea, rectal pain and vomiting.   Endocrine: Negative for polyphagia.   Musculoskeletal:  Negative for arthralgias, gait problem and joint swelling.        Uses a cane for ambulation   Skin:  Negative for color change, pallor and rash.   Allergic/Immunologic: Negative for food allergies.   Neurological:  Negative for speech difficulty and headaches.   Hematological:  Does not bruise/bleed easily.   Psychiatric/Behavioral:  Negative for agitation, confusion and sleep disturbance.        Vitals:    23 1440   BP: 144/76   Pulse: 73   Temp: 97.5 °F (36.4 °C)   SpO2: 98%       Physical Exam  Constitutional:       Appearance: He is well-developed.   HENT:      Head: Normocephalic.   Eyes:      Conjunctiva/sclera: Conjunctivae normal.   Cardiovascular:      Rate and Rhythm: Normal rate and regular rhythm.   Pulmonary:      Breath sounds: Normal breath sounds.   Abdominal:      General: Bowel sounds are normal.    "   Palpations: Abdomen is soft.   Musculoskeletal:         General: Normal range of motion.      Cervical back: Normal range of motion.   Skin:     General: Skin is warm and dry.   Neurological:      Mental Status: He is alert and oriented to person, place, and time.   Psychiatric:         Behavior: Behavior normal.         ASSESSMENT   #1 history of esophageal cancer status post esophageal resection with gastric pull-through  #2 gastroparesis presumed secondary to #1      PLAN  Continue current dietary regimen  Maintain Reglan 5 mg twice daily  Office follow-up in 6 months, call sooner as needed    No diagnosis found.

## 2023-12-05 ENCOUNTER — TRANSCRIBE ORDERS (OUTPATIENT)
Dept: HOME HEALTH SERVICES | Facility: HOME HEALTHCARE | Age: 73
End: 2023-12-05
Payer: MEDICARE

## 2023-12-05 ENCOUNTER — HOME HEALTH ADMISSION (OUTPATIENT)
Dept: HOME HEALTH SERVICES | Facility: HOME HEALTHCARE | Age: 73
End: 2023-12-05
Payer: MEDICARE

## 2023-12-05 DIAGNOSIS — L97.412 ULCER OF RIGHT HEEL, WITH FAT LAYER EXPOSED: ICD-10-CM

## 2023-12-05 DIAGNOSIS — I87.2 VENOUS INSUFFICIENCY: ICD-10-CM

## 2023-12-05 DIAGNOSIS — I89.0 LYMPHEDEMA: Primary | ICD-10-CM

## 2023-12-27 ENCOUNTER — OFFICE VISIT (OUTPATIENT)
Dept: INTERNAL MEDICINE | Age: 73
End: 2023-12-27
Payer: MEDICARE

## 2023-12-27 VITALS
SYSTOLIC BLOOD PRESSURE: 114 MMHG | DIASTOLIC BLOOD PRESSURE: 62 MMHG | OXYGEN SATURATION: 99 % | WEIGHT: 178 LBS | TEMPERATURE: 97.3 F | HEIGHT: 77 IN | BODY MASS INDEX: 21.02 KG/M2 | HEART RATE: 86 BPM

## 2023-12-27 DIAGNOSIS — G62.9 PERIPHERAL POLYNEUROPATHY: Chronic | ICD-10-CM

## 2023-12-27 DIAGNOSIS — G25.81 RLS (RESTLESS LEGS SYNDROME): Chronic | ICD-10-CM

## 2023-12-27 DIAGNOSIS — F33.41 RECURRENT MAJOR DEPRESSIVE DISORDER, IN PARTIAL REMISSION: Primary | Chronic | ICD-10-CM

## 2023-12-27 DIAGNOSIS — J44.9 CHRONIC OBSTRUCTIVE PULMONARY DISEASE, UNSPECIFIED COPD TYPE: Chronic | ICD-10-CM

## 2023-12-27 PROCEDURE — 3078F DIAST BP <80 MM HG: CPT | Performed by: INTERNAL MEDICINE

## 2023-12-27 PROCEDURE — 3074F SYST BP LT 130 MM HG: CPT | Performed by: INTERNAL MEDICINE

## 2023-12-27 PROCEDURE — 1159F MED LIST DOCD IN RCRD: CPT | Performed by: INTERNAL MEDICINE

## 2023-12-27 PROCEDURE — 1160F RVW MEDS BY RX/DR IN RCRD: CPT | Performed by: INTERNAL MEDICINE

## 2023-12-27 PROCEDURE — 99214 OFFICE O/P EST MOD 30 MIN: CPT | Performed by: INTERNAL MEDICINE

## 2023-12-27 NOTE — ASSESSMENT & PLAN NOTE
BP Readings from Last 3 Encounters:   12/27/23 114/62   11/30/23 144/76   11/28/23 122/60      Blood pressure remains stable on no blood pressure medication.

## 2023-12-27 NOTE — ASSESSMENT & PLAN NOTE
Verify which inhaler he has at home (either Spiriva or Stiolto). Complains of ongoing chronic cough and phlegm. Consider switch to Trelegy or Breztri if needed. Advised to use his inhaler daily. May take Mucinex DM BID as needed.

## 2023-12-27 NOTE — PROGRESS NOTES
I N T E R N A L  M E D I C I N E    J U N O H  K I M,  M D      ENCOUNTER DATE:  12/27/2023    Jose FITCH Hurtado / 73 y.o. / male    CHIEF COMPLAINT / REASON FOR OFFICE VISIT     Depression and Hypertension      ASSESSMENT & PLAN     Problem List Items Addressed This Visit          High    Recurrent major depressive disorder, in partial remission - Primary (Chronic)    Current Assessment & Plan     Depression is doing better back on paroxetine 40 mg daily. Advised to continue.          Relevant Medications    PARoxetine (PAXIL) 40 MG tablet       Medium    RLS (restless legs syndrome) (Chronic)    Overview     Continue pramipexole 1.5 mg two tabs nightly.          Relevant Medications    pramipexole (MIRAPEX) 1.5 MG tablet    Peripheral polyneuropathy (Chronic)    Overview     + severe B12 deficiency (6/7/19)  Continue B12 injections monthly    Continue gabapentin 100 mg TID          Relevant Medications    gabapentin (NEURONTIN) 100 MG capsule    Chronic obstructive pulmonary disease (Chronic)    Current Assessment & Plan     Verify which inhaler he has at home (either Spiriva or Stiolto). Complains of ongoing chronic cough and phlegm. Consider switch to Trelegy or Breztri if needed. Advised to use his inhaler daily. May take Mucinex DM BID as needed.          Relevant Medications    tiotropium bromide monohydrate (Spiriva Respimat) 2.5 MCG/ACT aerosol solution inhaler    tiotropium bromide-olodaterol (Stiolto Respimat) 2.5-2.5 MCG/ACT aerosol solution inhaler    albuterol sulfate  (90 Base) MCG/ACT inhaler    diphenhydrAMINE (BENADRYL) 25 MG tablet     No orders of the defined types were placed in this encounter.    No orders of the defined types were placed in this encounter.      SUMMARY/DISCUSSION        Next Appointment with me: Visit date not found    Return in about 4 months (around 4/27/2024) for Reassess chronic medical problems.      VITAL SIGNS     Vitals:    12/27/23 1433   BP: 114/62   Pulse:  "86   Temp: 97.3 °F (36.3 °C)   SpO2: 99%   Weight: 80.7 kg (178 lb)   Height: 195.6 cm (77\")       BP Readings from Last 3 Encounters:   12/27/23 114/62   11/30/23 144/76   11/28/23 122/60     Wt Readings from Last 3 Encounters:   12/27/23 80.7 kg (178 lb)   11/30/23 85.5 kg (188 lb 6.4 oz)   11/14/23 77.1 kg (170 lb)     Body mass index is 21.11 kg/m².    Blood pressure readings recorded on patient flowsheet:       No data to display                  MEDICATIONS AT THE TIME OF OFFICE VISIT     Current Outpatient Medications on File Prior to Visit   Medication Sig    acetaminophen (TYLENOL) 325 MG tablet Take 2 tablets by mouth Every 6 (Six) Hours As Needed for Mild Pain, Headache or Fever.    albuterol sulfate  (90 Base) MCG/ACT inhaler Inhale 1 puff Every 4 (Four) Hours As Needed for Wheezing.    atorvastatin (LIPITOR) 40 MG tablet Take 1 tablet by mouth Every Night.    cyanocobalamin 1000 MCG/ML injection INJECT 1 ML INTRAMUSCULARLY ONCE EVERY 30 DAYS AS DIRECTED    diphenhydrAMINE (BENADRYL) 25 MG tablet Take 2 tablets by mouth At Night As Needed for Itching. Indications: Skin Reaction due to an Allergy    Eliquis 5 MG tablet tablet TAKE ONE TABLET BY MOUTH EVERY 12 HOURS (Patient taking differently: Take 1 tablet by mouth Every 12 (Twelve) Hours. Indications: Atrial Fibrillation)    EPINEPHrine, Anaphylaxis, (ADRENALIN) 1 MG/ML injection Inject 0.3 mL into the appropriate muscle as directed by prescriber As Needed for Anaphylaxis. Indications: Life-Threatening Hypersensitivity Reaction    ertapenem (INVanz) 1 g injection     furosemide (LASIX) 40 MG tablet Take 0.5 tablets by mouth Daily. Indications: Cardiac Failure, Edema, -    gabapentin (NEURONTIN) 100 MG capsule Take 1 capsule by mouth 3 (Three) Times a Day.    metoclopramide (Reglan) 5 MG tablet Take 1 tablet by mouth 3 (Three) Times a Day Before Meals.    ondansetron ODT (ZOFRAN-ODT) 4 MG disintegrating tablet Place 1 tablet on the tongue Every 8 " "(Eight) Hours As Needed for Nausea or Vomiting.    pantoprazole (PROTONIX) 40 MG EC tablet TAKE ONE TABLET BY MOUTH TWICE A DAY BEFORE A MEAL    PARoxetine (PAXIL) 40 MG tablet TAKE ONE TABLET BY MOUTH EVERY MORNING    polyethylene glycol (MIRALAX) 17 g packet Take 17 g by mouth Daily.    potassium chloride (MICRO-K) 10 MEQ CR capsule Take 1 capsule by mouth Daily.    pramipexole (MIRAPEX) 1.5 MG tablet TAKE ONE TABLET BY MOUTH TWICE A DAY (Patient taking differently: Take 1 tablet by mouth Every Night. PT TAKES TWO TABLETS NIGHTLY   Indications: Restless Leg Syndrome)    tiotropium bromide monohydrate (Spiriva Respimat) 2.5 MCG/ACT aerosol solution inhaler Inhale 2 puffs Daily.    tiotropium bromide-olodaterol (Stiolto Respimat) 2.5-2.5 MCG/ACT aerosol solution inhaler Inhale 2.5 puffs Daily. Indications: Chronic Bronchitis, Chronic Obstructive Lung Disease    vitamin B-12 (CYANOCOBALAMIN) 500 MCG tablet tablet Take 1 tablet by mouth Daily.    tamsulosin (FLOMAX) 0.4 MG capsule 24 hr capsule Take 1 capsule by mouth Daily. (Patient not taking: Reported on 12/27/2023)     No current facility-administered medications on file prior to visit.          HISTORY OF PRESENT ILLNESS     Patient is accompanied by his spouse.     Patient complains of a productive cough, nasal congestion and postnasal drainage. Patient is unsure what inhaler he is using at home. He states \"I have 5 or 6 inhalers\". He has seen Dr. Sommers in sleep medicine in the past.    He reports he self-catheterizes 4 times daily. He reports urinary incontinence. He follows Dr. Blevins, urology.    Depression is stable on Paxil 40 mg daily.    Patient reports his neuropathy is well controlled on gabapentin.    He currently takes pramipexole 2 tablets at night for restless legs, which works well for him.    He confirms that he is taking his monthly B12 injections.     Patient follows Dr. Phelan for his anemia. Hemoglobin is stable. His last iron infusion was on " 11/14/2023. RBC was significantly low at 8.6 mm3 in 11/2023. Dr. Phelan believes his anemia is due to poor iron absorption due to his prior esophageal surgery. He has a follow up appointment with Dr. Phelan on 01/11/2024. He continues to take Eliquis.       Patient Care Team:  Brandin Bolton MD as PCP - General (Internal Medicine)  Tapan, George THORPE II, MD as Consulting Physician (Hematology and Oncology)  Jose Inman MD as Consulting Physician (Urology)  Mulugeta Millan MD as Consulting Physician (Gastroenterology)  Jose Christine III, MD as Referring Physician (Thoracic Surgery)  Seth Randhawa MD as Consulting Physician (Ophthalmology)  James Cyr MD as Consulting Physician (Cardiology)  Stephon Sommers MD as Consulting Physician (Sleep Medicine)  Rolanda Solomon MD as Consulting Physician (Nephrology)    REVIEW OF SYSTEMS     Review of Systems       PHYSICAL EXAMINATION     Physical Exam  Alert with normal thought and judgment.   No acute distress   Cardiovascular: Normal rate, regular rhythm.   Pulm/Chest: Effort normal, no wheezing, crackles or rales       REVIEWED DATA     Labs:     Lab Results   Component Value Date     11/17/2023    K 3.7 11/17/2023    CALCIUM 8.6 11/17/2023    AST 19 11/14/2023    ALT 15 11/14/2023    BUN 16 11/17/2023    CREATININE 1.05 11/17/2023    CREATININE 1.15 11/16/2023    CREATININE 1.18 11/15/2023    EGFRIFNONA 62 06/04/2021    EGFRIFAFRI 75 06/04/2021       Lab Results   Component Value Date    HGBA1C 5.50 08/06/2021    HGBA1C 6.20 (H) 01/17/2020       Lab Results   Component Value Date    LDL 47 06/23/2023    LDL 52 05/12/2023    LDL 98 06/02/2022    HDL 62 (H) 06/23/2023    HDL 52 05/12/2023    TRIG 36 06/23/2023    TRIG 49 05/12/2023       Lab Results   Component Value Date    TSH 1.600 05/12/2023    TSH 2.710 01/17/2020    TSH 2.050 06/06/2019    FREET4 1.36 05/12/2023    FREET4 1.52 01/17/2020    FREET4 1.18 06/06/2019       Lab Results  "  Component Value Date    WBC 5.21 11/17/2023    HGB 8.6 (L) 11/17/2023     11/17/2023     Lab Results   Component Value Date    HGB 8.6 (L) 11/17/2023    HGB 8.4 (L) 11/16/2023    HGB 8.4 (L) 11/15/2023      Lab Results   Component Value Date    FERRITIN 676.00 (H) 11/14/2023    FERRITIN 458.20 (H) 11/09/2023    FERRITIN 314.90 08/15/2023     Lab Results   Component Value Date    IRON 27 (L) 11/14/2023    IRON 19 (L) 11/09/2023    IRON 38 (L) 08/15/2023      Lab Results   Component Value Date    TIBC 238 (L) 11/14/2023    TIBC 243 (L) 11/09/2023    TIBC 231 (L) 08/15/2023       No results found for: \"MALBCRERATIO\"         Imaging:     US Renal Bilateral (12/14/2023 14:51)   CT Abdomen Pelvis Without Contrast (11/15/2023 00:09)     Medical Tests:           Summary of old records / correspondence / consultant report:           Request outside records:         Transcribed from ambient dictation for Brandin Bolton MD by Astrid Goins.  12/27/23   15:52 EST    Patient or patient representative verbalized consent to the visit recording.  I have personally performed the services described in this document as transcribed by the above individual, and it is both accurate and complete.  Brandin Bolton MD  12/27/2023  16:25 EST     "

## 2023-12-30 DIAGNOSIS — G62.9 PERIPHERAL POLYNEUROPATHY: Chronic | ICD-10-CM

## 2024-01-03 RX ORDER — GABAPENTIN 100 MG/1
100 CAPSULE ORAL 3 TIMES DAILY
Qty: 90 CAPSULE | Refills: 0 | Status: SHIPPED | OUTPATIENT
Start: 2024-01-03

## 2024-01-05 RX ORDER — METOCLOPRAMIDE 5 MG/1
5 TABLET ORAL
Qty: 90 TABLET | Refills: 1 | OUTPATIENT
Start: 2024-01-05

## 2024-01-05 NOTE — TELEPHONE ENCOUNTER
Caller: [facility] [phone] [place], [place] [facility] [address]  AT [facility] [phone] [facility] [phone] FX    Relationship: Pharmacy    Best call back number: [phone]     Requested Prescriptions:   Requested Prescriptions     Pending Prescriptions Disp Refills    metoclopramide (Reglan) 5 MG tablet 90 tablet 1     Sig: Take 1 tablet by mouth 3 (Three) Times a Day Before Meals. Indications: Delayed or Stopped Emptying of Stomach        Pharmacy where request should be sent: [facility] [phone] [place], [place] - [address] [facility] AT [facility] [phone] [facility] [phone] FX     Last office visit with prescribing clinician: 12/27/2023   Last telemedicine visit with prescribing clinician: Visit date not found   Next office visit with prescribing clinician: 5/1/2024       Does the patient have less than a 3 day supply:  [] Yes  [x] No    Would you like a call back once the refill request has been completed: [] Yes [x] No    If the office needs to give you a call back, can they leave a voicemail: [] Yes [x] No    Cory De La Cruz Rep   01/05/24 13:45 EST

## 2024-01-08 RX ORDER — METOCLOPRAMIDE 5 MG/1
5 TABLET ORAL
Qty: 90 TABLET | Refills: 1 | OUTPATIENT
Start: 2024-01-08

## 2024-01-15 NOTE — PROGRESS NOTES
Subjective .     REASONS FOR FOLLOWUP:  Esophageal cancer, cytopenias     HISTORY OF PRESENT ILLNESS:  The patient is a 73 y.o. year old male  who is here for follow-up with the above-mentioned history.    No new problems.  No bleeding.  No fever, chills, weight loss, night sweats    Past Medical History:   Diagnosis Date    Acute deep vein thrombosis (DVT) of popliteal vein of left lower extremity 03/24/2020    Anemia     Anti-phospholipid syndrome     On lifelong anticoagulation therapy    Bladder disorder     LEAKAGE   ON MED  wers pads    Bleeding hemorrhoids     Chronic kidney disease     Community acquired pneumonia     HISTORY OF IN 2014    Congestive heart failure     COPD (chronic obstructive pulmonary disease)     Deep venous thrombosis 2006, 2008    Left lower extremity multiple    Depression     Esophageal carcinoma 12/31/2014    had chemo and radiation prior surgery    Gastroparesis     Hemorrhoids     HH (hiatus hernia)     History of atrial fibrillation 2015    ONE EPISODE WHILE HOSPITALIZED    History of kidney stones     History of nephrolithiasis     History of pancreatitis     PT STATES MANY YEARS AGO    History of radiation therapy     History of transfusion     Hypertension     Long-term (current) use of anticoagulants, INR goal 2.0-3.0     Lymphedema     Malignant neoplasm of prostate     Other hyperlipidemia 01/30/2018 January 30, 2018 lipid panel risk 12.8%    Restless legs syndrome     Sleep apnea     OCCASIONALLY WEARS CPAP    Squamous carcinoma     on the head     Past Surgical History:   Procedure Laterality Date    APPENDECTOMY  1950    BRONCHOSCOPY      (Diagnostic)    CARDIAC CATHETERIZATION N/A 05/14/2022    Procedure: Left Heart Cath;  Surgeon: Eric So MD;  Location:  [facility] CATH [facility]VASIVE [facility];  Service: Cardiology;  Laterality: N/A;    CARDIAC CATHETERIZATION N/A 05/14/2022    Procedure: Coronary angiography;  Surgeon: Eric So MD;  Location:   [facility];  Service: Cardiology;  Laterality: N/A;    CARDIAC CATHETERIZATION N/A 05/14/2022    Procedure: Left ventriculography;  Surgeon: Eric So MD;  Location: [facility] CATH INVASIVE LOCATION;  Service: Cardiology;  Laterality: N/A;    CATARACT EXTRACTION Bilateral 2014    COLONOSCOPY  12/15/2014    Complete / Description: EH, IH, torts, stool, follow-up colonoscopy due in 5 years.    COLONOSCOPY N/A 06/13/2017    non-thrombosed external hemorrhoids, normal examined ileum, IH    COLONOSCOPY N/A 02/26/2019    Procedure: COLONOSCOPY with Cold Polypectomy;  Surgeon: Mulugeta Millan MD;  Location: [facility] ENDOSCOPY;  Service: Gastroenterology    COLONOSCOPY N/A 12/16/2020    Procedure: COLONOSCOPY to cecum into TI;  Surgeon: Mesha Sanchez MD;  Location: [facility] ENDOSCOPY;  Service: Gastroenterology;  Laterality: N/A;  pre: lower GI bleed  post: hemorrhoids     CYSTOSCOPY W/ LASER LITHOTRIPSY      ENDOSCOPY N/A 06/13/2017    Procedure: ESOPHAGOGASTRODUODENOSCOPY WITH COLD BIOPSY;  Surgeon: Lake Gonzalez MD;  Location: [facility] ENDOSCOPY;  Service:     ENDOSCOPY N/A 11/18/2021    Procedure: ESOPHAGOGASTRODUODENOSCOPY WITH  DILATATION WITH FLUOROSCOPY;  Surgeon: Jose Christine III, MD;  Location: [facility] ENDOSCOPY;  Service: Gastroenterology;  Laterality: N/A;  Pre: dysphagia, h/x of adinocarcinoma of esophagus  Post: same    ENDOSCOPY N/A 08/07/2023    Procedure: ESOPHAGOGASTRODUODENOSCOPY;  Surgeon: Jameel Valencia MD;  Location: [facility] ENDOSCOPY;  Service: Gastroenterology;  Laterality: N/A;  PRE- DYSPHAGIA  POST-ABORTED DUE TO RETAINED FOOD    ENDOSCOPY N/A 08/08/2023    Procedure: ESOPHAGOGASTRODUODENOSCOPY with bx;  Surgeon: Jameel Valencia MD;  Location: [facility] ENDOSCOPY;  Service: Gastroenterology;  Laterality: N/A;  pre: N/V, abd pain  post: esophageal nodule, retained food    ESOPHAGECTOMY      April 2015, stage IIB esophageal carcinoma, sub-total resection.     ESOPHAGECTOMY      Esophagectomy Subtotal Andrea Joe Procedure    EXCISION LESION  08/2012    Removal of Squamous Cell CA on Head    HAMMER TOE REPAIR  09/2014    Hammertoe Operation (Each Toe), 10/2014    HAMMER TOE REPAIR Left 10/03/2017    Procedure: Left second third and fourth distal interphalangeal joint resection with flexor tenotomy;  Surgeon: Mulugeta Lira MD;  Location: [facility] OR OSC;  Service:     HEMORRHOIDECTOMY N/A 12/23/2020    Procedure: HEMORRHOIDECTOMY;  Surgeon: Billy Julio Jr., MD;  [facility]: [facility] OR;  Service: General;  Laterality: N/A;    HERNIA REPAIR      incisional    JEJUNOSTOMY      Laparoscopic    JEJUNOSTOMY      tube removal     KNEE SURGERY Bilateral 1967, 1973, 1981    PATELLA SURGERY Left     removed    PILONIDAL CYST / SINUS EXCISION      AK ARTHRP KNE CONDYLE&PLATU MEDIAL&LAT COMPARTMENTS Right 03/26/2018    Procedure: TOTAL KNEE ARTHROPLASTY;  Surgeon: Renny Solis MD;  Location: [facility] MAIN OR;  Service: Orthopedics    PROSTATECTOMY  2010    PYLOROPLASTY      SIGMOIDOSCOPY N/A 08/02/2023    Procedure: SIGMOIDOSCOPY FLEXIBLE;  Surgeon: Mesha Sanchez MD;  Location: [facility] ENDOSCOPY;  Service: Gastroenterology;  Laterality: N/A;  PRE- ABNORMAL CT  POST- HEMORRHOIDS, ULCERATION    SPINAL FUSION  02/1998    C 5,6    TONSILLECTOMY      UPPER GASTROINTESTINAL ENDOSCOPY  12/15/2014    LA Grade D esophagitis, Pardo's, HH, multiple duodenal ulcers    VENTRAL/INCISIONAL HERNIA REPAIR N/A 04/14/2016    Procedure: VENTRAL/INCISIONAL HERNIA REPAIR, open, with mesh, and component separation;  Surgeon: Darren Rivas MD;  Location: [facility] MAIN OR;  Service:        HEMATOLOGIC/ONCOLOGIC HISTORY:  (History from previous dates can be found in the separate document.)    MEDICATIONS    Current Outpatient Medications:     acetaminophen (TYLENOL) 325 MG tablet, Take 2 tablets by mouth Every 6 (Six) Hours As Needed for Mild Pain, Headache or Fever., Disp: , Rfl:      albuterol sulfate  (90 Base) MCG/ACT inhaler, Inhale 1 puff Every 4 (Four) Hours As Needed for Wheezing., Disp: , Rfl:     atorvastatin (LIPITOR) 40 MG tablet, Take 1 tablet by mouth Every Night., Disp: 90 tablet, Rfl: 3    cyanocobalamin 1000 MCG/ML injection, INJECT 1 ML INTRAMUSCULARLY ONCE EVERY 30 DAYS AS DIRECTED, Disp: 1 mL, Rfl: 11    diphenhydrAMINE (BENADRYL) 25 MG tablet, Take 2 tablets by mouth At Night As Needed for Itching. Indications: Skin Reaction due to an Allergy, Disp: , Rfl:     Eliquis 5 MG tablet tablet, TAKE ONE TABLET BY MOUTH EVERY 12 HOURS (Patient taking differently: Take 1 tablet by mouth Every 12 (Twelve) Hours. Indications: Atrial Fibrillation), Disp: 60 tablet, Rfl: 1    EPINEPHrine, Anaphylaxis, (ADRENALIN) 1 MG/ML injection, Inject 0.3 mL into the appropriate muscle as directed by prescriber As Needed for Anaphylaxis. Indications: Life-Threatening Hypersensitivity Reaction, Disp: , Rfl:     ertapenem (INVanz) 1 g injection, , Disp: , Rfl:     furosemide (LASIX) 40 MG tablet, Take 0.5 tablets by mouth Daily. Indications: Cardiac Failure, Edema, -, Disp: , Rfl:     gabapentin (NEURONTIN) 100 MG capsule, TAKE ONE CAPSULE BY MOUTH THREE TIMES A DAY, Disp: 90 capsule, Rfl: 0    metoclopramide (Reglan) 5 MG tablet, Take 1 tablet by mouth 3 (Three) Times a Day Before Meals., Disp: 90 tablet, Rfl: 1    ondansetron ODT (ZOFRAN-ODT) 4 MG disintegrating tablet, Place 1 tablet on the tongue Every 8 (Eight) Hours As Needed for Nausea or Vomiting., Disp: 12 tablet, Rfl: 0    pantoprazole (PROTONIX) 40 MG EC tablet, TAKE ONE TABLET BY MOUTH TWICE A DAY BEFORE A MEAL, Disp: 180 tablet, Rfl: 3    PARoxetine (PAXIL) 40 MG tablet, TAKE ONE TABLET BY MOUTH EVERY MORNING, Disp: 30 tablet, Rfl: 5    polyethylene glycol (MIRALAX) 17 g packet, Take 17 g by mouth Daily., Disp: 30 each, Rfl: 0    potassium chloride (MICRO-K) 10 MEQ CR capsule, Take 1 capsule by mouth Daily., Disp: 90 capsule, Rfl:  "5    pramipexole (MIRAPEX) 1.5 MG tablet, TAKE ONE TABLET BY MOUTH TWICE A DAY (Patient taking differently: Take 1 tablet by mouth Every Night. PT TAKES TWO TABLETS NIGHTLY   Indications: Restless Leg Syndrome), Disp: 180 tablet, Rfl: 1    tiotropium bromide monohydrate (Spiriva Respimat) 2.5 MCG/ACT aerosol solution inhaler, Inhale 2 puffs Daily., Disp: 4 g, Rfl: 2    tiotropium bromide-olodaterol (Stiolto Respimat) 2.5-2.5 MCG/ACT aerosol solution inhaler, Inhale 2.5 puffs Daily. Indications: Chronic Bronchitis, Chronic Obstructive Lung Disease, Disp: , Rfl:     vitamin B-12 (CYANOCOBALAMIN) 500 MCG tablet tablet, Take 1 tablet by mouth Daily., Disp: 90 tablet, Rfl: 0    tamsulosin (FLOMAX) 0.4 MG capsule 24 hr capsule, Take 1 capsule by mouth Daily. (Patient taking differently: Take 1 capsule by mouth Daily.), Disp: 30 capsule, Rfl: 0    ALLERGIES:     Allergies   Allergen Reactions    Sulfamethoxazole-Trimethoprim Nausea Only       SOCIAL HISTORY:       Social History     Socioeconomic History    Marital status:      Spouse name: Lena    Number of children: 0    Years of education: College   Tobacco Use    Smoking status: Former     Packs/day: 2.00     Years: 3.00     Additional pack years: 0.00     Total pack years: 6.00     Types: Cigarettes     Quit date:      Years since quittin.0    Smokeless tobacco: Former     Types: Chew    Tobacco comments:     Caffeine - coffee and soda    Vaping Use    Vaping Use: Never used   Substance and Sexual Activity    Alcohol use: Yes     Alcohol/week: 3.0 standard drinks of alcohol     Types: 1 Glasses of wine, 1 Cans of beer, 1 Shots of liquor per week     Comment: 2    Drug use: Never     Comment: hx marijuana use \"a long time ago\"    Sexual activity: Defer     Partners: Male         FAMILY HISTORY:  Family History   Problem Relation Age of Onset    Cerebral aneurysm Mother         cerebral artery aneurysm ( age 56)    Anxiety disorder Father     " "Suicide Attempts Father          of suicide    Cancer Father         bladder    Prostate cancer Brother 68    No Known Problems Brother     Pancreatic cancer Nephew     Colon cancer Neg Hx     Esophageal cancer Neg Hx     Dementia Neg Hx     Malig Hyperthermia Neg Hx        REVIEW OF SYSTEMS:    Review of Systems   Constitutional:  Negative for activity change.   HENT:  Negative for nosebleeds and trouble swallowing.    Respiratory:  Negative for wheezing.    Cardiovascular:  Negative for chest pain and palpitations.   Gastrointestinal:  Negative for constipation and diarrhea.   Genitourinary:  Negative for dysuria and hematuria.   Musculoskeletal:  Negative for arthralgias and myalgias.   Neurological:  Negative for seizures and syncope.   Hematological:  Negative for adenopathy. Does not bruise/bleed easily.   Psychiatric/Behavioral:  Negative for confusion.            Objective    Vitals:    24 1320   BP: 133/72   Pulse: 71   Resp: 16   Temp: 98.2 °F (36.8 °C)   TempSrc: Temporal   SpO2: 99%   Weight: 82.6 kg (182 lb 3.2 oz)   Height: 195 cm (76.77\")   PainSc: 0-No pain           2024     1:21 PM   Current Status   ECOG score 1      PHYSICAL EXAM:        CONSTITUTIONAL:  Vital signs reviewed.  No distress, looks comfortable.  EYES:  Conjunctiva and lids unremarkable.  PERRLA  EARS,NOSE,MOUTH,THROAT:  Ears and nose appear unremarkable.  Lips, teeth, gums appear unremarkable.  RESPIRATORY:  Normal respiratory effort.  Lungs clear to auscultation bilaterally.  CARDIOVASCULAR:  Normal S1, S2.  No murmurs rubs or gallops.  Significant bilateral lower extremity unchanged  GASTROINTESTINAL: Abdomen appears unremarkable.  Nontender.  No hepatomegaly.  No splenomegaly.  LYMPHATIC:  No cervical, supraclavicular, axillary lymphadenopathy.  SKIN:  Warm.  No rashes.  PSYCHIATRIC:  Normal judgment and insight.  Normal mood and affect.             RECENT LABS:        WBC   Date/Time Value Ref Range Status "   01/16/2024 01:02 PM 3.93 3.40 - 10.80 10*3/mm3 Final   06/23/2023 02:51 PM 4.45 3.40 - 10.80 10*3/mm3 Final   04/16/2018 04:25 PM 4.23 (L) 4.5 - 11.0 10*3/uL Final     Hemoglobin   Date/Time Value Ref Range Status   01/16/2024 01:02 PM 10.0 (L) 13.0 - 17.7 g/dL Final   04/16/2018 04:25 PM 9.9 (L) 13.5 - 17.5 g/dL Final     Platelets   Date/Time Value Ref Range Status   01/16/2024 01:02  140 - 450 10*3/mm3 Final   04/16/2018 04:25  140 - 440 10*3/uL Final       Assessment/Plan     ASSESSMENT:  There are no diagnoses linked to this encounter.    *Esophageal adenocarcinoma. Initially T2N0M0. After neoadjuvant carboplatin/Taxol with radiation, achieved a pathologic CR at resection, 4/24/2015.   As per NCCN guidelines, plan CAT scans every 6 months x1 year, then every 6 to 9 months on years 2 and years 3. Defer any surveillance EGDs to Dr. Gonzalez if he feels they are appropriate.   Also as per NCCN guidelines, plan H and P every 3 to 6 months on years 1 and years 2, then every 6 to 12 months' time on years 3 through years 5 and then annually.   EGD 6/13/17 by Dr. Gonzalez: No evidence of recurrence.  CT scan 3/2/18 (this completed 3 years of surveillance CT): No evidence of recurrence.  Plan no more surveillance CT.  EGD 11/18/2021, Dr. Christine: No evidence of recurrent cancer.  Dilated.  No signs of recurrence.  Remaining in remission.    *Recurrent DVT, prior to cancer diagnosis.    Dr. Bolton previously managed his Coumadin.   Chronic left leg larger than right, since DVT  Acute left popliteal, gastrocnemius DVT on 3/24/2020 despite INR 3.4.  Therefore, changed to Eliquis.  On 3/25/2020, unremarkable: Lupus anticoagulant, beta-2 glycoprotein antibodies.  Anticardiolipin antibodies also felt to be unremarkable with IgG and IgM both at 15 (negative is <15.  Indeterminate is 15-20).  No signs of recurrent DVT.  Continuing on Eliquis.  No bleeding problems    *CT angiogram chest 3/24/2020 (LLE acute DVT found  3/24/2020): Mild increased opacity LLL may represent developing infiltrate versus atelectasis.  Radiologist recommended follow-up.  CT chest 5/1/2020: Resolution of groundglass LLL infiltrate from the 3/24/2020 CT.  A few mildly enlarged mediastinal nodes are less prominent than on the 3/18/2020 CT.  No new abnormalities.  Plan no more follow-up CT scans.    *Persistent cough.  He has seen Dr. Maher Sayied for this.    He did not complain of this today.    *Previously complained of emesis occurring 5 hours after eating on average once every month or 2.  No nausea otherwise.  Denies dysphagia or odynophagia.    Unchanged.  Occurring on average once every couple of months.  He did not complain of this today    *Iron deficiency anemia  Hb mostly around 11  On 4/15/2019, ferritin 29.7, 13% saturation.  Serum folate normal.  On oral iron daily through PCP.  On 8/23/19, ferritin 65, 14%.   Increased oral iron.   On 10/15/19, ferritin 72, 10%.  On 10/25/2019, stopped oral iron since it was not helping.    Oral iron not effective due to poor absorption  2 doses Injectafer.  On 12/13/2019, ferritin 708, 15% saturation, Hb 10.4.  Therefore, no IV iron.  Monitor.  On 5/5/2020, ferritin 346, 15% saturation, Hb 9.9.  Therefore, no need for IV iron at this time.  On 7/7/2020, Hb up to 11.7.  Iron labs normal.  Admission 12/15/2020: Hb 6.6.  Ferritin 40, iron saturation 17%.  Discharged on 12/21/2020 with Hb 7.1, which fell from 7.9 on 12/18/2020, from 8.9 in the early morning 12/18/2020.  The drop in Hb was thought to be due to hemorrhoidal bleeding related to Eliquis.  Eliquis was stopped.  Hemorrhoidal repair planned by Dr. Flynn Julio after the holidays.  Was given Venofer 300 mg on 12/17/2020 by Dr. Ross of our practice  On 12/29/2020, ferritin 176, 13% saturation, Hb 8.4, reticulate hemoglobin low at 25.7.  Suspect he would benefit from some more iron.  Given 1 dose Injectafer.  On 2/2/2021, ferritin 317, 17% saturation,  Hb 10.3.  Therefore, Hb gradually improved since the 1 dose of Injectafer.  3/2/2021: Ferritin iron 93, 11% saturation, Hb 11.9.  1 dose Injectafer.  3/23/2021: Ferritin 471, 20% saturation, Hb 10.8  7/6/2021: Ferritin 329, 21% saturation, Hb 10.8  9/28/2021: Ferritin 230, 20% saturation, Hb 11.4.  No need for IV iron.  12/28/2021: Ferritin 337, 6% iron saturation, Hb 10.4.  No IV iron given.  (Although saturation is low, ferritin is 337).  3/22/2022: Ferritin 112, 12% saturation, Hb 10.6.  Plan 1 dose Injectafer.  5/31/2022: Ferritin 473, 23% saturation, Hb 11.4  8/30/2022: Ferritin 308, 17% saturation, Hb 11.7.  No need for Injectafer  11/22/2022: Ferritin 268, 14% saturation.  Hb 11.9.  No need for Injectafer.  2/15/2023: Hb 11.  Ferritin 247, 19% saturation.  No need for Injectafer.  5/16/2023: Hb 12.  Ferritin 358, 15% saturation.  No need for Injectafer.  8/15/23: Hb 10.3  11/9/2023: Hb 9.4.  Ferritin 458, 8% saturation.  Arrange 1 dose Injectafer.  If insurance will not cover Injectafer then arrange Venofer 200 mg x 2 doses  1/16/2024: Hb 10, ferritin 234, 20% saturation    *Hemorrhoidal bleeding with Hb down to 6.6, requiring admission, 12/15/2020.  Thought to be related to Eliquis.  Eliquis was stopped.  Hemorrhoidal repair by Dr. Flynn Julio, 12/23/2020  Eliquis subsequently restarted with no more issues with bleeding.  1 episode of dark stools yesterday, 9/27/2021.  Told him if he notices more of this he should contact Dr. Sanchez.  3/22/2022: Denies any rectal bleeding.  States this has not really been an issue since the hemorrhoidal repair  8/30/2022: Denies bleeding  11/22/2022: Denies bleeding  5/16/2023: Denies bleeding  11/9/2023: Denies bleeding    *9/28/2021: Change in stool frequency.  Was having a bowel movement 3 times per week.  For the past week has been having 3 formed bowel movements per day.  I told him if this persists he should discuss with Dr. Sanchez.    *Source of iron deficiency.  Last  colonoscopy 2/26/2019 by Dr. Millan.  Last EGD 6/13/2017 by Dr. Gonzalez.  Suspect he has an absorption issue due to previous esophageal surgery.    *B12 deficiency.  On 6/6/2019, B12 <150  On B12 injections through PCP.  B12 on 5/5/2020 normal at 694  B12 on 2/2/2021, 789  5/16/2023 B12 1124  11/9/2023: B12 615  Check a B12 at the next visit    *Anemia for reasons in addition to iron deficiency and B12 deficiency  Baseline Hb mostly 10.5-11.5.  On 5/5/2020, unremarkable: RBC folate, iron labs, B12, haptoglobin, TB, LDH, direct Maki.  Creatinine baseline is 0.9-1.2   Hb 9.9 on 5/5/2020  On 7/7/2020, Hb up to 11.7.  Return to prior follow-up plan.  On 2/2/2021, B12 and RBC folate unremarkable  3/23/2021: Hb 10.8 despite normal iron stores.  5/25/2021, Hb 10.7 with normal iron labs  7/6/2021, Hb 10.8 with normal iron labs  Hb 10, from 9.4, from 10.3, from 12, from 11, from 10.2, from 11.9, from 11.7, from 11.4    *Leukocytopenia  New issue on 3/23/2021, WBC 3.38, based on recent labs, but WBC has been as low as 2.9 December 2019  WBC 3.9, from 6.4, from 4.6, from 3.9, from 4.3, from 4.1, from 4.6, from 4    *Neutropenia  ANC 1590 on 3/23/2021 (previously ANC has been as low as 1480 with WBC in the low normal range)  ANC 1480, from 4230, from 2910, from 2150, from 2160, from 2110, from 2560, from 1690    *Thrombocytopenia  New issue on 3/23/2021, , based on recent labs, but PLT has been as low as 119 October 2019  , from 274, from 201, from 180, from 163, from 155, from 174, from 158    *Dyspnea on exertion x2 weeks on 11/22/2022 visit  Advised to discuss with his PCP and in the meantime if it worsens he should go to the ER    *Admitted early January 2023 for respiratory failure due to RSV.  Also had A-fib with RVR.    *He had a white blood cell count in the upper 3s and a hemoglobin in the upper 12s with a normal platelet count prior to beginning chemotherapy.     PLAN:  MD CBC stat ferritin, iron  panel, reticulate hemoglobin, B12 level 2 months  (I offered 6-month follow-up but he prefers to maintain 3-month)  (Seeing him in 2 months instead of his usual 3 months because Hb has been a little lower recently)  Baseline Hb when not in the hospital mostly 9.5-11.5  Continue Eliquis    hemorrhoids have been repaired.  Last drop of Hb was down to 7.1 on 12/21/2020.  (Hemorrhoidal bleeding)    Prior plan was:  MD every 6-month.  No more surveillance CT scans.  He does not have a port.    I have discussed with him if Hb drops and remains around 9 or so, we may want to plan a bone marrow biopsy

## 2024-01-16 ENCOUNTER — LAB (OUTPATIENT)
Dept: OTHER | Facility: HOSPITAL | Age: 74
End: 2024-01-16
Payer: MEDICARE

## 2024-01-16 ENCOUNTER — TELEPHONE (OUTPATIENT)
Dept: ONCOLOGY | Facility: CLINIC | Age: 74
End: 2024-01-16
Payer: MEDICARE

## 2024-01-16 ENCOUNTER — OFFICE VISIT (OUTPATIENT)
Dept: ONCOLOGY | Facility: CLINIC | Age: 74
End: 2024-01-16
Payer: MEDICARE

## 2024-01-16 VITALS
TEMPERATURE: 98.2 F | SYSTOLIC BLOOD PRESSURE: 133 MMHG | BODY MASS INDEX: 21.51 KG/M2 | WEIGHT: 182.2 LBS | OXYGEN SATURATION: 99 % | DIASTOLIC BLOOD PRESSURE: 72 MMHG | HEIGHT: 77 IN | HEART RATE: 71 BPM | RESPIRATION RATE: 16 BRPM

## 2024-01-16 DIAGNOSIS — E61.1 IRON DEFICIENCY: ICD-10-CM

## 2024-01-16 DIAGNOSIS — D50.9 IRON DEFICIENCY ANEMIA, UNSPECIFIED IRON DEFICIENCY ANEMIA TYPE: Primary | ICD-10-CM

## 2024-01-16 LAB
BASOPHILS # BLD AUTO: 0.03 10*3/MM3 (ref 0–0.2)
BASOPHILS NFR BLD AUTO: 0.8 % (ref 0–1.5)
DEPRECATED RDW RBC AUTO: 54.5 FL (ref 37–54)
EOSINOPHIL # BLD AUTO: 0.34 10*3/MM3 (ref 0–0.4)
EOSINOPHIL NFR BLD AUTO: 8.7 % (ref 0.3–6.2)
ERYTHROCYTE [DISTWIDTH] IN BLOOD BY AUTOMATED COUNT: 16.1 % (ref 12.3–15.4)
FERRITIN SERPL-MCNC: 234.2 NG/ML (ref 30–400)
HCT VFR BLD AUTO: 32.9 % (ref 37.5–51)
HGB BLD-MCNC: 10 G/DL (ref 13–17.7)
HGB RETIC QN AUTO: 31.4 PG (ref 29.8–36.1)
IMM GRANULOCYTES # BLD AUTO: 0.01 10*3/MM3 (ref 0–0.05)
IMM GRANULOCYTES NFR BLD AUTO: 0.3 % (ref 0–0.5)
IMM RETICS NFR: 9 % (ref 3–15.8)
IRON 24H UR-MRATE: 60 MCG/DL (ref 59–158)
IRON SATN MFR SERPL: 20 % (ref 20–50)
LYMPHOCYTES # BLD AUTO: 1.63 10*3/MM3 (ref 0.7–3.1)
LYMPHOCYTES NFR BLD AUTO: 41.5 % (ref 19.6–45.3)
MCH RBC QN AUTO: 28.3 PG (ref 26.6–33)
MCHC RBC AUTO-ENTMCNC: 30.4 G/DL (ref 31.5–35.7)
MCV RBC AUTO: 93.2 FL (ref 79–97)
MONOCYTES # BLD AUTO: 0.44 10*3/MM3 (ref 0.1–0.9)
MONOCYTES NFR BLD AUTO: 11.2 % (ref 5–12)
NEUTROPHILS NFR BLD AUTO: 1.48 10*3/MM3 (ref 1.7–7)
NEUTROPHILS NFR BLD AUTO: 37.5 % (ref 42.7–76)
NRBC BLD AUTO-RTO: 0 /100 WBC (ref 0–0.2)
PLATELET # BLD AUTO: 167 10*3/MM3 (ref 140–450)
PMV BLD AUTO: 9 FL (ref 6–12)
RBC # BLD AUTO: 3.53 10*6/MM3 (ref 4.14–5.8)
RETICS # AUTO: 0.02 10*6/MM3 (ref 0.02–0.13)
RETICS/RBC NFR AUTO: 0.65 % (ref 0.7–1.9)
TIBC SERPL-MCNC: 297 MCG/DL (ref 298–536)
TRANSFERRIN SERPL-MCNC: 199 MG/DL (ref 200–360)
WBC NRBC COR # BLD AUTO: 3.93 10*3/MM3 (ref 3.4–10.8)

## 2024-01-16 PROCEDURE — 85046 RETICYTE/HGB CONCENTRATE: CPT | Performed by: INTERNAL MEDICINE

## 2024-01-16 PROCEDURE — 84466 ASSAY OF TRANSFERRIN: CPT | Performed by: INTERNAL MEDICINE

## 2024-01-16 PROCEDURE — 82728 ASSAY OF FERRITIN: CPT | Performed by: INTERNAL MEDICINE

## 2024-01-16 PROCEDURE — 83540 ASSAY OF IRON: CPT | Performed by: INTERNAL MEDICINE

## 2024-01-16 PROCEDURE — 36415 COLL VENOUS BLD VENIPUNCTURE: CPT

## 2024-01-16 PROCEDURE — 85025 COMPLETE CBC W/AUTO DIFF WBC: CPT | Performed by: INTERNAL MEDICINE

## 2024-01-16 NOTE — TELEPHONE ENCOUNTER
Caller: Jose Hurtado    Relationship: Self    Best call back number: [phone]      What was the call regarding: PT WAS EXPOSED TO COVID LAST FRIDAY 1-12-24, HE HAS NO SYMPTOMS. CAN HE STILL COME TO APPOINTMENT TODAY

## 2024-01-16 NOTE — TELEPHONE ENCOUNTER
Called the patient to ensure he had no symptoms and was fever free after his exposure to covid. He stated he had 0 symptoms and he was indeed fever free. I let him know he would be able to come in and I made Dr. Phelan aware. Patient v/u.

## 2024-03-14 ENCOUNTER — TELEPHONE (OUTPATIENT)
Dept: CARDIOLOGY | Facility: CLINIC | Age: 74
End: 2024-03-14

## 2024-03-14 NOTE — TELEPHONE ENCOUNTER
Caller: Jose Hurtado    Relationship to patient: Self    Best call back number: [phone]    Chief complaint: NONE    Type of visit: FOLLOW UP    Requested date: ? SCHEDULED PATIENT ON 7/5/24  THAT WAS 1S AVAILABLE WITH MICHELLE LEZAMA    If rescheduling, when is the original appointment: 3/25/24     Additional notes:CALL PATIENT BACK IF HE NEEDS TO BE SEEN SOONER

## 2024-03-15 ENCOUNTER — TELEPHONE (OUTPATIENT)
Dept: INTERNAL MEDICINE | Age: 74
End: 2024-03-15

## 2024-03-15 NOTE — TELEPHONE ENCOUNTER
Caller: Jose Hurtado    Relationship: Self    Best call back number: [phone]     Who are you requesting to speak with (clinical staff, provider,  specific staff member): DR. HEALY    What was the call regarding: GOT A COVID VACCINE LAST YEAR AND GOT A NOTICE FROM [facility] THAT SAID HE'S DUE FOR ANOTHER COVID VACCINE WANTS TO KNOW IF HE NEEDS A NEW ONE PLEASE CALL AND ADVISE

## 2024-03-20 ENCOUNTER — TELEPHONE (OUTPATIENT)
Dept: INTERNAL MEDICINE | Age: 74
End: 2024-03-20
Payer: MEDICARE

## 2024-03-20 DIAGNOSIS — R26.89 BALANCE PROBLEM: Primary | ICD-10-CM

## 2024-03-20 DIAGNOSIS — R26.89 DECREASED MOBILITY: ICD-10-CM

## 2024-03-25 RX ORDER — APIXABAN 5 MG/1
TABLET, FILM COATED ORAL
Qty: 60 TABLET | Refills: 1 | Status: SHIPPED | OUTPATIENT
Start: 2024-03-25

## 2024-03-28 DIAGNOSIS — G25.81 RLS (RESTLESS LEGS SYNDROME): Chronic | ICD-10-CM

## 2024-03-28 RX ORDER — PRAMIPEXOLE DIHYDROCHLORIDE 1.5 MG/1
1.5 TABLET ORAL 2 TIMES DAILY
Qty: 180 TABLET | Refills: 1 | Status: SHIPPED | OUTPATIENT
Start: 2024-03-28

## 2024-04-01 ENCOUNTER — TELEPHONE (OUTPATIENT)
Dept: SURGERY | Facility: CLINIC | Age: 74
End: 2024-04-01
Payer: MEDICARE

## 2024-04-01 NOTE — TELEPHONE ENCOUNTER
Left a VM with new upcoming appt details 5/13 [facility]. Reminder has been sent to pts listed address. Reminder of CT has been added in the VM.

## 2024-04-04 ENCOUNTER — HOSPITAL ENCOUNTER (OUTPATIENT)
Dept: CT IMAGING | Facility: HOSPITAL | Age: 74
Discharge: HOME | End: 2024-04-04
Admitting: THORACIC SURGERY (CARDIOTHORACIC VASCULAR SURGERY)
Payer: MEDICARE

## 2024-04-04 DIAGNOSIS — C15.9 ADENOCARCINOMA OF ESOPHAGUS: ICD-10-CM

## 2024-04-04 PROCEDURE — 71250 CT THORAX DX C-: CPT

## 2024-04-04 PROCEDURE — 74176 CT ABD & PELVIS W/O CONTRAST: CPT

## 2024-04-23 ENCOUNTER — TREATMENT (OUTPATIENT)
Age: 74
End: 2024-04-23
Payer: MEDICARE

## 2024-04-23 DIAGNOSIS — G89.29 CHRONIC PAIN OF BOTH KNEES: ICD-10-CM

## 2024-04-23 DIAGNOSIS — M25.561 CHRONIC PAIN OF BOTH KNEES: ICD-10-CM

## 2024-04-23 DIAGNOSIS — M25.562 CHRONIC PAIN OF BOTH KNEES: ICD-10-CM

## 2024-04-23 DIAGNOSIS — G89.29 CHRONIC BILATERAL LOW BACK PAIN WITHOUT SCIATICA: ICD-10-CM

## 2024-04-23 DIAGNOSIS — R26.89 DECREASED MOBILITY: ICD-10-CM

## 2024-04-23 DIAGNOSIS — R53.81 PHYSICAL DECONDITIONING: ICD-10-CM

## 2024-04-23 DIAGNOSIS — R26.89 BALANCE PROBLEM: Primary | ICD-10-CM

## 2024-04-23 DIAGNOSIS — M54.50 CHRONIC BILATERAL LOW BACK PAIN WITHOUT SCIATICA: ICD-10-CM

## 2024-04-23 PROCEDURE — 97163 PT EVAL HIGH COMPLEX 45 MIN: CPT | Performed by: PHYSICAL THERAPIST

## 2024-04-23 PROCEDURE — 97530 THERAPEUTIC ACTIVITIES: CPT | Performed by: PHYSICAL THERAPIST

## 2024-04-23 PROCEDURE — 97110 THERAPEUTIC EXERCISES: CPT | Performed by: PHYSICAL THERAPIST

## 2024-04-23 NOTE — PROGRESS NOTES
Physical Therapy Initial Evaluation and Plan of Care  [facility]   [address] [place] [address]  P: [phone]       F: [phone]       Patient: Jose Hurtado   : 1950  Visit Diagnoses:     ICD-10-CM ICD-9-CM   1. Balance problem  R26.89 781.99   2. Decreased mobility  R26.89 781.99   3. Physical deconditioning  R53.81 799.3   4. Chronic pain of both knees  M25.561 719.46    M25.562 338.29    G89.29    5. Chronic bilateral low back pain without sciatica  M54.50 724.2    G89.29 338.29     Referring practitioner: Brandin Bolton MD  Date of Initial Visit: 2024  Today's Date: 2024  Patient seen for 1 sessions           Subjective Questionnaire: ABC: 50%      Subjective Evaluation    History of Present Illness  Date of onset: 3/22/2024  Mechanism of injury: Patient reports he recently completed treatment for infections in his feet and swelling in his legs.  States he is off antibiotics and is wearing compression wrapping on both legs (knee high).      Has been having difficulty with his balance in standing and walking. Can't stand up straight which affects his balance.  Reports he is going to a chiropractor 2x/wk for treatment on his low back.  Reports getting adjustments, having some aches and pains with them.  Reports he has back pain any time he tries to straighten his back.    Reports no falls recently.        Standing tolerance 2-3 minutes and then he needs to sit due to increased pain in his back.    PMH:   Copied from EMR:   Foot infections, Hypertension, Congestive heart failure, NSTEMI (non-ST elevated myocardial infarction), Elevated troponin, Atrial fibrillation, History of recurrent deep vein thrombosis (DVT), History of DVT (deep vein thrombosis), Chronic venous hypertension due to DVT, Chronic anticoagulation, B12 deficiency, Gastroparesis, Adenomatous polyp of colon, Gastrointestinal hemorrhage, Bleeding hemorrhoid, Malabsorption of  iron, Dysphagia, Genitourinary and Reproductive, CKD (chronic kidney disease) stage 2, GFR 60-89 ml/min, History of esophageal cancer, Anemia, Iron deficiency, Malignant neoplasm of prostate, Acute blood loss anemia, Pancytopenia, Iron deficiency anemia, History of esophageal cancer, Recurrent major depressive disorder, in partial remission, Insomnia due to other mental disorder, RLS (restless legs syndrome), Peripheral polyneuropathy, Tremor of both hands, Chronic obstructive pulmonary disease, Bronchitis, Gait instability, Lymphedema, Abnormal CT scan, Generalized weakness, Dyspnea       Subjective comment: Patient reports back pain with difficulty standing and walking.  Patient Occupation: Retired Pain  At best pain ratin (sitting, laying down)  At worst pain ratin (straightening back/standing/walking)  Location: lumbar  Quality: dull ache  Relieving factors: change in position, rest and heat  Aggravating factors: standing and ambulation (straightening his back)  Progression: no change (in the past 6-8 months)    Social Support  Lives in: one-story house  Lives with: spouse    Hand dominance: right    Treatments  Previous treatment: physical therapy  Current treatment: chiropractic  Patient Goals  Patient goals for therapy: improved balance  Patient goal: Improve walking           Objective          Palpation   Left   Hypertonic in the iliopsoas.   Tenderness of the distal biceps femoris, distal semimembranosus, distal semitendinosus, iliopsoas and lumbar paraspinals.     Right   Hypertonic in the iliopsoas. Tenderness of the distal biceps femoris, distal semimembranosus, distal semitendinosus, iliopsoas and lumbar paraspinals.     Tenderness   Left Knee   Tenderness in the medial joint line and pes anserinus.     Right Knee   Tenderness in the medial joint line and pes anserinus.     Active Range of Motion   Left Hip   Extension: 15 (LAG) degrees     Right Hip   Extension: 15 (LAG) degrees   Left Knee    Extensor la degrees     Right Knee   Extensor la degrees     Additional Active Range of Motion Details  Lumbar ROM is limited, grossly 50%.    Strength/Myotome Testing     Left Hip   Planes of Motion   Flexion: 3+  Extension: 3+  Abduction: 3+  External rotation: 4  Internal rotation: 4    Right Hip   Planes of Motion   Flexion: 3+  Extension: 3+  Abduction: 3+  External rotation: 4  Internal rotation: 4    Left Knee   Flexion: 5  Extension: 5    Right Knee   Flexion: 5  Extension: 5    Ambulation     Observational Gait   Gait: antalgic   Decreased walking speed.     Quality of Movement During Gait   Trunk  Forward lean.     Functional Assessment     Comments  5XSTS (24 inch height table): 43.19 seconds (used left hand to push up from table, right hand on walking stick)    TUG (from 24 inch height table): 23.01 seconds (used left hand to push up from table, right hand on walking stick, pause with stepping to make turn)          Assessment & Plan       Assessment  Impairments: abnormal gait, abnormal or restricted ROM, activity intolerance, impaired physical strength, lacks appropriate home exercise program and pain with function   Functional limitations: sleeping, walking, uncomfortable because of pain and standing   Assessment details: Jose Hurtado is a pleasant 74 y.o. male that presents with signs and symptoms consistent with the above diagnosis. He has limited ROM in trunk and bilateral lower extremities, impaired functional mobility and impaired gait.  Pt will benefit from skilled PT services in order to address listed impairments, decrease pain and restore function.    Prognosis: good  Prognosis details: Patient demonstrates good rehab potential as evidenced by high motivation to participate with PT POC and to return to PLOF/ADLs/IADLs.    Goals  Plan Goals: Short Term Goals (6-8 wks):  1.  Patient will have increased lumbar spine ROM to WFL.  2.  Patient will have increased bilateral hip  extension to 0 degrees.  3.  Patient will have increased bilateral knee extension to 0 degrees.  4.  Patient will normalized tone in bilateral iliopsoas.    Long Term Goals (12 wks):  1.  Patient will be independent in performance of HEP.  2.  Patient will have improved ABC score of 60% or better.  3.  Patient will have improved 5XSTS test time of 30  seconds or less.  4.  Patient will have improved TUG test time 18 seconds or less.    Plan  Therapy options: will be seen for skilled therapy services  Planned modality interventions: dry needling, thermotherapy (hydrocollator packs) and cryotherapy  Planned therapy interventions: manual therapy, postural training, soft tissue mobilization, spinal/joint mobilization, strengthening, stretching, flexibility, functional ROM exercises, home exercise program, neuromuscular re-education, therapeutic activities, abdominal trunk stabilization, body mechanics training and balance/weight-bearing training  Frequency: 2x week  Duration in weeks: 12  Treatment plan discussed with: patient  Plan details: Pt was educated on the importance of their HEP and their current need for continued skilled physical therapy. Patients goals and potential limitations were discussed and pt is in agreement with current plan of care and treatment emphasis.              History # of Personal Factors and/or Comorbidities: HIGH (3+)  Examination of Body System(s): # of elements: HIGH (4+)  Clinical Presentation: STABLE   Clinical Decision Making: HIGH      Timed:         Manual Therapy:    15     mins  12402;     Therapeutic Exercise:    8     mins  43773;     Neuromuscular Wil:        mins  87569;    Therapeutic Activity:     10     mins  25370;     Gait Training:           mins  10520;     Ultrasound:          mins  49398;    Ionto                                  mins  19532  Self Care                            mins  77433  Canalith Repos         mins  63198  Orthotic MGMT/Train         mins   19208    Un-Timed:  Electrical Stimulation:         mins  95562 ( );  Dry Needling:          mins  35620 self-pay;  Dry Needling:          mins  26874 self-pay  Traction          mins  72518  Low Eval          mins  16566  Mod Eval          mins  45773  High Eval                       40     mins  87280    Timed Treatment:   33   mins   Total Treatment:     73   mins      PT SIGNATURE: Mervat Ramachandran PT     License Number: [id number]  Electronically signed by Mervat Ramachandran PT, 04/23/24, 1:24 PM EDT      DATE TREATMENT INITIATED: 4/23/2024    Initial Certification  Certification Period: 4/23/2024 thru 7/21/2024  I certify that the therapy services are furnished while this patient is under my care.  The services outlined above are required by this patient, and will be reviewed every 90 days.     PHYSICIAN: Brandin Bolton MD      NPI: [id number]  DATE:         Please sign and return via fax to [phone]. Thank you, [facility].

## 2024-04-30 ENCOUNTER — TELEPHONE (OUTPATIENT)
Dept: ONCOLOGY | Facility: CLINIC | Age: 74
End: 2024-04-30
Payer: MEDICARE

## 2024-04-30 NOTE — TELEPHONE ENCOUNTER
Called the patients spouse to see if he was doing okay and if he would be okay with pushing out his appt to next Tuesday 5/7 as he had missed the 8:40am visit this morning. Patient did not get the v/m from scheduling when they rescheduled him. Patients wife stated this would be fine and ochoa/mary Pickard in scheduling notified to make the change.

## 2024-05-01 ENCOUNTER — OFFICE VISIT (OUTPATIENT)
Dept: INTERNAL MEDICINE | Age: 74
End: 2024-05-01
Payer: MEDICARE

## 2024-05-01 ENCOUNTER — HOSPITAL ENCOUNTER (OUTPATIENT)
Dept: PHYSICAL THERAPY | Facility: HOSPITAL | Age: 74
Setting detail: THERAPIES SERIES
Discharge: HOME | End: 2024-05-01
Payer: MEDICARE

## 2024-05-01 VITALS
HEART RATE: 74 BPM | DIASTOLIC BLOOD PRESSURE: 70 MMHG | TEMPERATURE: 97.3 F | SYSTOLIC BLOOD PRESSURE: 120 MMHG | HEIGHT: 77 IN | OXYGEN SATURATION: 100 % | BODY MASS INDEX: 21.13 KG/M2 | WEIGHT: 179 LBS

## 2024-05-01 DIAGNOSIS — I89.0 LYMPHEDEMA: Primary | ICD-10-CM

## 2024-05-01 DIAGNOSIS — G62.9 PERIPHERAL POLYNEUROPATHY: Chronic | ICD-10-CM

## 2024-05-01 DIAGNOSIS — I48.91 ATRIAL FIBRILLATION, UNSPECIFIED TYPE: ICD-10-CM

## 2024-05-01 DIAGNOSIS — J44.9 CHRONIC OBSTRUCTIVE PULMONARY DISEASE, UNSPECIFIED COPD TYPE: Chronic | ICD-10-CM

## 2024-05-01 DIAGNOSIS — G25.81 RLS (RESTLESS LEGS SYNDROME): Chronic | ICD-10-CM

## 2024-05-01 DIAGNOSIS — F33.41 RECURRENT MAJOR DEPRESSIVE DISORDER, IN PARTIAL REMISSION: Primary | Chronic | ICD-10-CM

## 2024-05-01 PROCEDURE — 97164 PT RE-EVAL EST PLAN CARE: CPT

## 2024-05-01 PROCEDURE — 97140 MANUAL THERAPY 1/> REGIONS: CPT

## 2024-05-01 PROCEDURE — 3078F DIAST BP <80 MM HG: CPT | Performed by: INTERNAL MEDICINE

## 2024-05-01 PROCEDURE — G2211 COMPLEX E/M VISIT ADD ON: HCPCS | Performed by: INTERNAL MEDICINE

## 2024-05-01 PROCEDURE — 99214 OFFICE O/P EST MOD 30 MIN: CPT | Performed by: INTERNAL MEDICINE

## 2024-05-01 PROCEDURE — 3074F SYST BP LT 130 MM HG: CPT | Performed by: INTERNAL MEDICINE

## 2024-05-01 NOTE — THERAPY RE-EVALUATION
Physical Therapy Lymphedema Re-Evaluation  [facility]     Patient Name: Jose Hurtado  : 1950  MRN: [id number]  Today's Date: 2024      Visit Date: 2024    Visit Dx:    ICD-10-CM ICD-9-CM   1. Lymphedema  I89.0 457.1       Patient Active Problem List   Diagnosis    History of esophageal cancer    Adenomatous polyp of colon    Malignant neoplasm of prostate    RLS (restless legs syndrome)    History of DVT (deep vein thrombosis)    Recurrent major depressive disorder, in partial remission    Hypertension    Anemia    Insomnia due to other mental disorder    Peripheral polyneuropathy    B12 deficiency    Lymphedema    Iron deficiency    Gastroparesis    History of recurrent deep vein thrombosis (DVT)    Tremor of both hands    Gastrointestinal hemorrhage    Acute blood loss anemia    Pancytopenia    Chronic venous hypertension due to DVT    Bleeding hemorrhoid    Malabsorption of iron    Iron deficiency anemia    History of esophageal cancer    Abnormal CT scan    Generalized weakness    Chronic anticoagulation    Infection due to ESBL-producing Escherichia coli    CKD (chronic kidney disease) stage 2, GFR 60-89 ml/min    NSTEMI (non-ST elevated myocardial infarction)    Bronchitis    Dyspnea    Elevated troponin    Atrial fibrillation    Chronic obstructive pulmonary disease    Congestive heart failure    Gait instability    Dark stools    Proctitis    Acute urinary retention    Severe malnutrition    Other symptoms and signs concerning food and fluid intake    Weight loss    Acute UTI    Bilateral hydronephrosis    Scarring of lung    Wound of left great toe    Bacteremia due to Escherichia coli    Moderate malnutrition        Past Medical History:   Diagnosis Date    Acute deep vein thrombosis (DVT) of popliteal vein of left lower extremity 2020    Anemia     Anti-phospholipid syndrome     On lifelong anticoagulation therapy    Bladder disorder     LEAKAGE   ON MED  wers pads     Bleeding hemorrhoids     Chronic kidney disease     Community acquired pneumonia     HISTORY OF IN 2014    Congestive heart failure     COPD (chronic obstructive pulmonary disease)     Deep venous thrombosis 2006, 2008    Left lower extremity multiple    Depression     Esophageal carcinoma 12/31/2014    had chemo and radiation prior surgery    Gastroparesis     Hemorrhoids     HH (hiatus hernia)     History of atrial fibrillation 2015    ONE EPISODE WHILE HOSPITALIZED    History of kidney stones     History of nephrolithiasis     History of pancreatitis     PT STATES MANY YEARS AGO    History of radiation therapy     History of transfusion     Hypertension     Long-term (current) use of anticoagulants, INR goal 2.0-3.0     Lymphedema     Malignant neoplasm of prostate     Other hyperlipidemia 01/30/2018 January 30, 2018 lipid panel risk 12.8%    Restless legs syndrome     Sleep apnea     OCCASIONALLY WEARS CPAP    Squamous carcinoma     on the head        Past Surgical History:   Procedure Laterality Date    APPENDECTOMY  1950    BRONCHOSCOPY      (Diagnostic)    CARDIAC CATHETERIZATION N/A 05/14/2022    Procedure: Left Heart Cath;  Surgeon: Eric So MD;  Location:  [facility];  Service: Cardiology;  Laterality: N/A;    CARDIAC CATHETERIZATION N/A 05/14/2022    Procedure: Coronary angiography;  Surgeon: Eric So MD;  Location:  [facility];  Service: Cardiology;  Laterality: N/A;    CARDIAC CATHETERIZATION N/A 05/14/2022    Procedure: Left ventriculography;  Surgeon: Eric So MD;  Location:  [facility];  Service: Cardiology;  Laterality: N/A;    CATARACT EXTRACTION Bilateral 2014    COLONOSCOPY  12/15/2014    Complete / Description: EH, IH, torts, stool, follow-up colonoscopy due in 5 years.    COLONOSCOPY N/A 06/13/2017    non-thrombosed external hemorrhoids, normal examined ileum, IH    COLONOSCOPY N/A 02/26/2019     Procedure: COLONOSCOPY with Cold Polypectomy;  Surgeon: Mulugeta Millan MD;  Location: [facility] ENDOSCOPY;  Service: Gastroenterology    COLONOSCOPY N/A 12/16/2020    Procedure: COLONOSCOPY to cecum into TI;  Surgeon: Mesha Sanchez MD;  Location: [facility] ENDOSCOPY;  Service: Gastroenterology;  Laterality: N/A;  pre: lower GI bleed  post: hemorrhoids     CYSTOSCOPY W/ LASER LITHOTRIPSY      ENDOSCOPY N/A 06/13/2017    Procedure: ESOPHAGOGASTRODUODENOSCOPY WITH COLD BIOPSY;  Surgeon: Lake Gonzalez MD;  Location: [facility] ENDOSCOPY;  Service:     ENDOSCOPY N/A 11/18/2021    Procedure: ESOPHAGOGASTRODUODENOSCOPY WITH  DILATATION WITH FLUOROSCOPY;  Surgeon: Jose Christine III, MD;  Location: [facility] ENDOSCOPY;  Service: Gastroenterology;  Laterality: N/A;  Pre: dysphagia, h/x of adinocarcinoma of esophagus  Post: same    ENDOSCOPY N/A 08/07/2023    Procedure: ESOPHAGOGASTRODUODENOSCOPY;  Surgeon: Jameel Valencia MD;  Location: [facility] ENDOSCOPY;  Service: Gastroenterology;  Laterality: N/A;  PRE- DYSPHAGIA  POST-ABORTED DUE TO RETAINED FOOD    ENDOSCOPY N/A 08/08/2023    Procedure: ESOPHAGOGASTRODUODENOSCOPY with bx;  Surgeon: Jameel Valencia MD;  Location: [facility] ENDOSCOPY;  Service: Gastroenterology;  Laterality: N/A;  pre: N/V, abd pain  post: esophageal nodule, retained food    ESOPHAGECTOMY      April 2015, stage IIB esophageal carcinoma, sub-total resection.    ESOPHAGECTOMY      Esophagectomy Subtotal [place] Procedure    EXCISION LESION  08/2012    Removal of Squamous Cell CA on Head    HAMMER TOE REPAIR  09/2014    Hammertoe Operation (Each Toe), 10/2014    HAMMER TOE REPAIR Left 10/03/2017    Procedure: Left second third and fourth distal interphalangeal joint resection with flexor tenotomy;  Surgeon: Mulugeta Lira MD;  Location: [facility] OR [facility];  Service:     HEMORRHOIDECTOMY N/A 12/23/2020    Procedure: HEMORRHOIDECTOMY;  Surgeon: Billy Julio Jr., MD;  Location: [facility] MAIN OR;   Service: General;  Laterality: N/A;    HERNIA REPAIR      incisional    JEJUNOSTOMY      Laparoscopic    JEJUNOSTOMY      tube removal     KNEE SURGERY Bilateral 1967, 1973, 1981    PATELLA SURGERY Left     removed    PILONIDAL CYST / SINUS EXCISION      AK ARTHRP KNE CONDYLE&PLATU MEDIAL&LAT COMPARTMENTS Right 03/26/2018    Procedure: TOTAL KNEE ARTHROPLASTY;  Surgeon: Renny Solis MD;  Location: [facility] OR;  Service: Orthopedics    PROSTATECTOMY  2010    PYLOROPLASTY      SIGMOIDOSCOPY N/A 08/02/2023    Procedure: SIGMOIDOSCOPY FLEXIBLE;  Surgeon: Mesha Sanchez MD;  Location: [facility] ENDOSCOPY;  Service: Gastroenterology;  Laterality: N/A;  PRE- ABNORMAL CT  POST- HEMORRHOIDS, ULCERATION    SPINAL FUSION  02/1998    C 5,6    TONSILLECTOMY      UPPER GASTROINTESTINAL ENDOSCOPY  12/15/2014    LA Grade D esophagitis, Pardo's, HH, multiple duodenal ulcers    VENTRAL/INCISIONAL HERNIA REPAIR N/A 04/14/2016    Procedure: VENTRAL/INCISIONAL HERNIA REPAIR, open, with mesh, and component separation;  Surgeon: Darren Rivas MD;  Location: [facility] OR;  Service:        Visit Dx:    ICD-10-CM ICD-9-CM   1. Lymphedema  I89.0 457.1            Lymphedema       Row Name 05/01/24 1500             Subjective Pain    Able to rate subjective pain? yes  -      Pre-Treatment Pain Level 0  -         Subjective    Subjective Comments Pt reports that he has just been wrapping his legs with SS bandages 3-4x/week due to difficulty donning compression socks, wears bandaging 24/7 unless showering.  About a week ago, started having some increased swelling even with bandaging so came to get it checked out and see if he needs to change anything.  -         Lymphedema Assessment    Lymphedema Classification RLE:;LLE:;secondary;stage 2 (Spontaneously Irreversible)  -      Lymphedema Assessment Comments BLE lymphedema is well reduced except just distal to knees, wearing compression bandaging upon arrival  -          Posture/Observations    Posture/Observations Comments Ambulates slowly, using SC, extremely flexed posture, severe genu valgus  -KA         General ROM    GENERAL ROM COMMENTS Pt able to reach ankles and feet  -KA         MMT (Manual Muscle Testing)    General MMT Comments BLE strength at least 3/5  -KA         Lymphedema Edema Assessment    Edema Assessment Comment BLE lymphedema present from foot to knee, well reduced except for the top 6 inches distal to knee  -KA         Skin Changes/Observations    Skin Observations Comment Skin is in good condition, mild dryness noted  -KA         Lymphedema Measurements    Measurement Type(s) Circumferential  -KA      Circumferential Areas Lower extremities  -KA         BLE Circumferential (cm)    Measurement Location 1 Knee  -KA      Left 1 41.5 cm  -KA      Right 1 39.6 cm  -KA      Measurement Location 2 10cm below knee  -KA      Left 2 36.9 cm  -KA      Right 2 36.3 cm  -KA      Measurement Location 3 20cm below knee  -KA      Left 3 32.6 cm  -KA      Right 3 34.8 cm  -KA      Measurement Location 4 30cm below knee  -KA      Left 4 26.5 cm  -KA      Right 4 25.8 cm  -KA      Measurement Location 5 Ankle  -KA      Left 5 26.4 cm  -KA      Right 5 26.5 cm  -KA      Measurement Location 6 Midfoot  -KA      Left 6 25.3 cm  -KA      Right 6 25.4 cm  -KA      LLE Circumferential Total 189.2 cm  -KA      RLE Circumferential Total 188.4 cm  -KA         Compression/Skin Care    Compression/Skin Care skin care;wrapping location;bandaging;remove bandages  -KA      Skin Care washed/dried;moisturizing lotion applied  -KA      Wrapping Location lower extremity  -KA      Wrapping Location LE bilateral:;foot to knee  -KA      Bandaging Comments Compressive undersock, Rosidal K 2-10 cm, 1-12 cm to each leg, pt's shoes  -KA      Bandaging Technique circumferential/spiral;light compression  -KA      Remove Bandages Therapist removed bandaging for assessment  -KA                User Key   (r) = Recorded By, (t) = Taken By, (c) = Cosigned By      Initials Name Provider Type    Rubina Amaya, MANDA Physical Therapist                                    Therapy Education  Education Details: Discussed adding foam or cotton padding under bandages if he notes skin irritation, added 4th SS bandage per leg to get better coverage all the way to knee.  Girth measurements are better than at discharge from therapy last visit, feel he is doing well with compression bandaging vs. compression garment, does need to replace SS bandaged as they are wearing out from constant use.  Will order using insurance benefits through [employer] and ship to patient's home.  Recommend using lymphedema pump to help manage lymphedema as well for 1 hour per day, especially when experiencing exacerbations.  Return to clinic as needed for re-evaluation, but no further treatment needed at this time as he is managing the condition well.  Given: Edema management, HEP, Symptoms/condition management, Bandaging/dressing change  Program: New, Reinforced  How Provided: Verbal, Demonstration  Provided to: Patient  Level of Understanding: Verbalized       OP Exercises       Row Name 05/01/24 1706 05/01/24 1500          Subjective    Subjective Comments -- Pt reports that he has just been wrapping his legs with SS bandages 3-4x/week due to difficulty donning compression socks, wears bandaging 24/7 unless showering.  About a week ago, started having some increased swelling even with bandaging so came to get it checked out and see if he needs to change anything.  -KA        Subjective Pain    Able to rate subjective pain? -- yes  -KA     Pre-Treatment Pain Level -- 0  -KA        Total Minutes    73687 - PT Manual Therapy Minutes 15  -KA --               User Key  (r) = Recorded By, (t) = Taken By, (c) = Cosigned By      Initials Name Provider Type    Rubina Amaya, MANDA Physical Therapist                            Manual Rx (Last 36  Hours)       Manual Treatments       Row Name 05/01/24 1706             Total Minutes    82762 - PT Manual Therapy Minutes 15  -KA                User Key  (r) = Recorded By, (t) = Taken By, (c) = Cosigned By      Initials Name Provider Type    Rubina Amaya, PT Physical Therapist                     PT OP Goals       Row Name 05/01/24 1600          Long Term Goals    LTG Date to Achieve 07/30/24  -KA     LTG 1 Pt to report compliance with lymphedema pump as needed for flare ups in symptoms.  -KA     LTG 1 Progress New  -KA        Time Calculation    PT Goal Re-Cert Due Date 07/30/24  -KA               User Key  (r) = Recorded By, (t) = Taken By, (c) = Cosigned By      Initials Name Provider Type    Rubina Amaya, PT Physical Therapist                     PT Assessment/Plan       Row Name 05/01/24 1701          PT Assessment    Functional Limitations Limitation in home management;Performance in leisure activities;Performance in self-care ADL  -KA     Impairments Edema;Gait;Impaired lymphatic circulation;Posture  -KA     Assessment Comments Pt re-evaluated this date as he has recently noticed increased swelling in legs even with consistent self bandaging of legs.  Pt did have long term compression that fit his legs, but due to difficulty donning stocking and dislike of velcro garments, has opted to perform compression bandaging long term instead.  He is not using typical padding under bandaging and is only using 3 SS bandages per legs; however legs are well reduced except for the top 6 inches just distal to the knee and skin is tolerating his treatment well.  Ultimately think he is doing well with self management, just needs to replace short stretch bandaging due to being worn out and recommend adding 4th SS bandage per leg.  Also recommended use of lymphedema pump 1 hour per day, especially when experiencing flare ups in symptoms.  Reapplied compression bandaging at end of re-evaluation.  Will place  bandaging order through [employer], no further skilled treatment needed at this time.  -KA     Rehab Potential Good  -KA     Patient/caregiver participated in establishment of treatment plan and goals Yes  -KA     Patient would benefit from skilled therapy intervention No  -KA        PT Plan    PT Frequency Other (comment)  -     Predicted Duration of Therapy Intervention (PT) Re-evaluation as needed  -DEVONTE     Planned CPT's? PT RE-EVAL: 33649;PT THER PROC EA 15 MIN: 33058;PT THER ACT EA 15 MIN: 39004;PT MANUAL THERAPY EA 15 MIN: 24878;PT NEUROMUSC RE-EDUCATION EA 15 MIN: 82993;PT GAIT TRAINING EA 15 MIN: 77226;PT SELF CARE/HOME MGMT/TRAIN EA 15: 80946  -     PT Plan Comments Order new SS bandages, continue with self management.  Re-evaluation as needed.  -DEVONTE               User Key  (r) = Recorded By, (t) = Taken By, (c) = Cosigned By      Initials Name Provider Type    Rubina Amaya, PT Physical Therapist                                 Time Calculation:   Start Time: 1527  Stop Time: 1605  Time Calculation (min): 38 min  Timed Charges  17238 - PT Manual Therapy Minutes: 15  Untimed Charges  PT Eval/Re-eval Minutes: 23  Total Minutes  Timed Charges Total Minutes: 15  Untimed Charges Total Minutes: 23   Total Minutes: 38   Therapy Charges for Today       Code Description Service Date Service Provider Modifiers Qty    44302514889 HC PT MANUAL THERAPY EA 15 MIN 5/1/2024 Rubina Powers, PT GP 1    34916902354 HC PT RE-EVAL ESTABLISHED PLAN 2 5/1/2024 Rubina Powers, PT GP 1                      Rubina Powers PT  5/1/2024

## 2024-05-01 NOTE — PROGRESS NOTES
I N T E R N A L  M E D I C I N E    J U N O H  K I M,  M D      ENCOUNTER DATE:  05/01/2024    Jose FITCH Hurtado / 74 y.o. / male    CHIEF COMPLAINT / REASON FOR OFFICE VISIT     Depression, Restless Legs Syndrome, Peripheral polyneuropathy, COPD, and Hypertension      ASSESSMENT & PLAN     Problem List Items Addressed This Visit          High    Recurrent major depressive disorder, in partial remission - Primary (Chronic)    Current Assessment & Plan     Not compliant with medication but he would like to resume paroxetine 40 mg daily. Advised to take it daily.          Relevant Medications    PARoxetine (PAXIL) 40 MG tablet       Medium    RLS (restless legs syndrome) (Chronic)    Overview     Continue pramipexole 1.5 mg two tabs nightly.          Relevant Medications    pramipexole (MIRAPEX) 1.5 MG tablet    Peripheral polyneuropathy (Chronic)    Overview     + severe B12 deficiency (6/7/19)  Continue B12 injections monthly         Current Assessment & Plan     Not taking gabapentin. Does not feel he needs it at this time. Monitor.          Atrial fibrillation    Current Assessment & Plan     Not taking Eliquis. Advised to follow-up with cardiology and Dr. Phelan.          Relevant Medications    EPINEPHrine, Anaphylaxis, (ADRENALIN) 1 MG/ML injection    Chronic obstructive pulmonary disease (Chronic)    Current Assessment & Plan     No planned follow-up with pulmonologist. Not compliant with inhalers or CPAP.          Relevant Medications    albuterol sulfate  (90 Base) MCG/ACT inhaler     No orders of the defined types were placed in this encounter.    No orders of the defined types were placed in this encounter.      SUMMARY/DISCUSSION      Next Appointment with me: Visit date not found    Return in about 6 months (around 11/1/2024) for Reassess chronic medical problems.      VITAL SIGNS     Vitals:    05/01/24 1118   BP: 120/70   Pulse: 74   Temp: 97.3 °F (36.3 °C)   SpO2: 100%   Weight: 81.2 kg (179 lb)  "  Height: 195 cm (76.77\")       BP Readings from Last 3 Encounters:   05/01/24 120/70   01/16/24 133/72   12/27/23 114/62     Wt Readings from Last 3 Encounters:   05/01/24 81.2 kg (179 lb)   01/16/24 82.6 kg (182 lb 3.2 oz)   12/27/23 80.7 kg (178 lb)     Body mass index is 21.35 kg/m².    Blood pressure readings recorded on patient flowsheet:       No data to display                  MEDICATIONS AT THE TIME OF OFFICE VISIT     Current Outpatient Medications on File Prior to Visit   Medication Sig    acetaminophen (TYLENOL) 325 MG tablet Take 2 tablets by mouth Every 6 (Six) Hours As Needed for Mild Pain, Headache or Fever.    albuterol sulfate  (90 Base) MCG/ACT inhaler Inhale 1 puff Every 4 (Four) Hours As Needed for Wheezing.    Eliquis 5 MG tablet tablet TAKE ONE TABLET BY MOUTH EVERY 12 HOURS (Patient taking differently: Once a day prn  Indications: Atrial Fibrillation)    EPINEPHrine, Anaphylaxis, (ADRENALIN) 1 MG/ML injection Inject 0.3 mL into the appropriate muscle as directed by prescriber As Needed for Anaphylaxis. Indications: Life-Threatening Hypersensitivity Reaction    PARoxetine (PAXIL) 40 MG tablet TAKE ONE TABLET BY MOUTH EVERY MORNING    polyethylene glycol (MIRALAX) 17 g packet Take 17 g by mouth Daily.    pramipexole (MIRAPEX) 1.5 MG tablet Take 1 tablet by mouth 2 (Two) Times a Day. Indications: Restless Leg Syndrome    [DISCONTINUED] vitamin B-12 (CYANOCOBALAMIN) 500 MCG tablet tablet Take 1 tablet by mouth Daily.    atorvastatin (LIPITOR) 40 MG tablet Take 1 tablet by mouth Every Night. (Patient not taking: Reported on 5/1/2024)    cyanocobalamin 1000 MCG/ML injection INJECT 1 ML INTRAMUSCULARLY ONCE EVERY 30 DAYS AS DIRECTED (Patient not taking: Reported on 5/1/2024)    ertapenem (INVanz) 1 g injection  (Patient not taking: Reported on 5/1/2024)    [DISCONTINUED] diphenhydrAMINE (BENADRYL) 25 MG tablet Take 2 tablets by mouth At Night As Needed for Itching. Indications: Skin Reaction " "due to an Allergy (Patient not taking: Reported on 5/1/2024)    [DISCONTINUED] furosemide (LASIX) 40 MG tablet Take 0.5 tablets by mouth Daily. Indications: Cardiac Failure, Edema, - (Patient not taking: Reported on 5/1/2024)    [DISCONTINUED] gabapentin (NEURONTIN) 100 MG capsule TAKE ONE CAPSULE BY MOUTH THREE TIMES A DAY    [DISCONTINUED] metoclopramide (Reglan) 5 MG tablet Take 1 tablet by mouth 3 (Three) Times a Day Before Meals. (Patient not taking: Reported on 5/1/2024)    [DISCONTINUED] ondansetron ODT (ZOFRAN-ODT) 4 MG disintegrating tablet Place 1 tablet on the tongue Every 8 (Eight) Hours As Needed for Nausea or Vomiting. (Patient not taking: Reported on 5/1/2024)    [DISCONTINUED] pantoprazole (PROTONIX) 40 MG EC tablet TAKE ONE TABLET BY MOUTH TWICE A DAY BEFORE A MEAL (Patient not taking: Reported on 5/1/2024)    [DISCONTINUED] potassium chloride (MICRO-K) 10 MEQ CR capsule Take 1 capsule by mouth Daily. (Patient not taking: Reported on 5/1/2024)    [DISCONTINUED] tamsulosin (FLOMAX) 0.4 MG capsule 24 hr capsule Take 1 capsule by mouth Daily. (Patient not taking: Reported on 5/1/2024)    [DISCONTINUED] tiotropium bromide monohydrate (Spiriva Respimat) 2.5 MCG/ACT aerosol solution inhaler Inhale 2 puffs Daily. (Patient not taking: Reported on 5/1/2024)    [DISCONTINUED] tiotropium bromide-olodaterol (Stiolto Respimat) 2.5-2.5 MCG/ACT aerosol solution inhaler Inhale 2.5 puffs Daily. Indications: Chronic Bronchitis, Chronic Obstructive Lung Disease (Patient not taking: Reported on 5/1/2024)     No current facility-administered medications on file prior to visit.          HISTORY OF PRESENT ILLNESS     Not taking most of his medications. Takes pramipexole at night for restless leg syndrome. Not taking paroxetine, gabapentin, furosemide or his inhalers. Takes few doses of Eliquis here and there. He states he does not feel he needs to take these medications because he feels \"fine\". Has upcoming follow-up " with Dr. Phelan. He states he was taking Eliquis primarily for history of DVT but he has history of paroxysmal atrial fibrillation. Not using CPAP. He states his wife was just recently made aware of his medication issue.     Patient Care Team:  Brandin Bolton MD as PCP - General (Internal Medicine)  Tapan, George THORPE II, MD as Consulting Physician (Hematology and Oncology)  Jose Inman MD as Consulting Physician (Urology)  Mulugeta Millan MD as Consulting Physician (Gastroenterology)  Jose Christine III, MD as Referring Physician (Thoracic Surgery)  Seth Randhawa MD as Consulting Physician (Ophthalmology)  James Cyr MD as Consulting Physician (Cardiology)  Stephon Sommers MD as Consulting Physician (Sleep Medicine)  Rolanda Solomon MD as Consulting Physician (Nephrology)    REVIEW OF SYSTEMS     Review of Systems   Constitutional neg except per HPI   Resp neg  CV neg for chest pain, increased AGUILERA or palpitations   No abdominal pain, melena or bleeding.   Neuro negative for change   Psych negative for change     PHYSICAL EXAMINATION     Physical Exam  Alert with normal thought and judgment.   Mood is stable  Cardiovascular: Normal rate, regular rhythm.   Pulm/Chest: Effort normal, breath sounds normal.       REVIEWED DATA     Labs:     Lab Results   Component Value Date     11/17/2023    K 3.7 11/17/2023    CALCIUM 8.6 11/17/2023    AST 19 11/14/2023    ALT 15 11/14/2023    BUN 16 11/17/2023    CREATININE 1.05 11/17/2023    CREATININE 1.15 11/16/2023    CREATININE 1.18 11/15/2023    EGFRRESULT 59.5 (L) 06/23/2023       Lab Results   Component Value Date    HGBA1C 5.50 08/06/2021    HGBA1C 6.20 (H) 01/17/2020       Lab Results   Component Value Date    LDL 47 06/23/2023    LDL 52 05/12/2023    LDL 98 06/02/2022    HDL 62 (H) 06/23/2023    HDL 52 05/12/2023    TRIG 36 06/23/2023    TRIG 49 05/12/2023       Lab Results   Component Value Date    TSH 1.600 05/12/2023    TSH 2.710 01/17/2020  "   TSH 2.050 06/06/2019    FREET4 1.36 05/12/2023    FREET4 1.52 01/17/2020    FREET4 1.18 06/06/2019       Lab Results   Component Value Date    WBC 3.93 01/16/2024    HGB 10.0 (L) 01/16/2024     01/16/2024       No results found for: \"MALBCRERATIO\"              Imaging:               Medical Tests:               Summary of old records / correspondence / consultant report:     Office Visit with George Phelan II, MD (01/16/2024)         Request outside records:       "

## 2024-05-01 NOTE — ASSESSMENT & PLAN NOTE
Not compliant with medication but he would like to resume paroxetine 40 mg daily. Advised to take it daily.

## 2024-05-02 NOTE — TELEPHONE ENCOUNTER
"Patient calling with follow-up question from appointment. Wants to know why Dr. Phelan wants him to do a \"bone marrow scan\". His callback is [phone]   " Yes

## 2024-05-07 ENCOUNTER — LAB (OUTPATIENT)
Dept: OTHER | Facility: HOSPITAL | Age: 74
End: 2024-05-07
Payer: MEDICARE

## 2024-05-07 ENCOUNTER — OFFICE VISIT (OUTPATIENT)
Dept: ONCOLOGY | Facility: CLINIC | Age: 74
End: 2024-05-07
Payer: MEDICARE

## 2024-05-07 ENCOUNTER — TELEPHONE (OUTPATIENT)
Dept: GASTROENTEROLOGY | Facility: CLINIC | Age: 74
End: 2024-05-07
Payer: MEDICARE

## 2024-05-07 VITALS
OXYGEN SATURATION: 98 % | HEIGHT: 77 IN | DIASTOLIC BLOOD PRESSURE: 70 MMHG | SYSTOLIC BLOOD PRESSURE: 109 MMHG | HEART RATE: 76 BPM | TEMPERATURE: 98.2 F | BODY MASS INDEX: 19.73 KG/M2 | RESPIRATION RATE: 16 BRPM | WEIGHT: 167.1 LBS

## 2024-05-07 DIAGNOSIS — D50.9 IRON DEFICIENCY ANEMIA, UNSPECIFIED IRON DEFICIENCY ANEMIA TYPE: ICD-10-CM

## 2024-05-07 DIAGNOSIS — D50.9 IRON DEFICIENCY ANEMIA, UNSPECIFIED IRON DEFICIENCY ANEMIA TYPE: Primary | ICD-10-CM

## 2024-05-07 DIAGNOSIS — E53.8 B12 DEFICIENCY: Chronic | ICD-10-CM

## 2024-05-07 LAB
BASOPHILS # BLD AUTO: 0.03 10*3/MM3 (ref 0–0.2)
BASOPHILS NFR BLD AUTO: 0.7 % (ref 0–1.5)
DEPRECATED RDW RBC AUTO: 45.2 FL (ref 37–54)
EOSINOPHIL # BLD AUTO: 0.23 10*3/MM3 (ref 0–0.4)
EOSINOPHIL NFR BLD AUTO: 5.1 % (ref 0.3–6.2)
ERYTHROCYTE [DISTWIDTH] IN BLOOD BY AUTOMATED COUNT: 13.9 % (ref 12.3–15.4)
FERRITIN SERPL-MCNC: 286.2 NG/ML (ref 30–400)
HCT VFR BLD AUTO: 37.6 % (ref 37.5–51)
HGB BLD-MCNC: 11.9 G/DL (ref 13–17.7)
HGB RETIC QN AUTO: 30.3 PG (ref 29.8–36.1)
IMM GRANULOCYTES # BLD AUTO: 0.01 10*3/MM3 (ref 0–0.05)
IMM GRANULOCYTES NFR BLD AUTO: 0.2 % (ref 0–0.5)
IMM RETICS NFR: 11.3 % (ref 3–15.8)
IRON 24H UR-MRATE: 30 MCG/DL (ref 59–158)
IRON SATN MFR SERPL: 10 % (ref 20–50)
LYMPHOCYTES # BLD AUTO: 1.65 10*3/MM3 (ref 0.7–3.1)
LYMPHOCYTES NFR BLD AUTO: 36.9 % (ref 19.6–45.3)
MCH RBC QN AUTO: 28.3 PG (ref 26.6–33)
MCHC RBC AUTO-ENTMCNC: 31.6 G/DL (ref 31.5–35.7)
MCV RBC AUTO: 89.3 FL (ref 79–97)
MONOCYTES # BLD AUTO: 0.5 10*3/MM3 (ref 0.1–0.9)
MONOCYTES NFR BLD AUTO: 11.2 % (ref 5–12)
NEUTROPHILS NFR BLD AUTO: 2.05 10*3/MM3 (ref 1.7–7)
NEUTROPHILS NFR BLD AUTO: 45.9 % (ref 42.7–76)
NRBC BLD AUTO-RTO: 0 /100 WBC (ref 0–0.2)
PLATELET # BLD AUTO: 158 10*3/MM3 (ref 140–450)
PMV BLD AUTO: 10.7 FL (ref 6–12)
RBC # BLD AUTO: 4.21 10*6/MM3 (ref 4.14–5.8)
RETICS # AUTO: 0.03 10*6/MM3 (ref 0.02–0.13)
RETICS/RBC NFR AUTO: 0.67 % (ref 0.7–1.9)
TIBC SERPL-MCNC: 301 MCG/DL (ref 298–536)
TRANSFERRIN SERPL-MCNC: 202 MG/DL (ref 200–360)
VIT B12 BLD-MCNC: 878 PG/ML (ref 211–946)
WBC NRBC COR # BLD AUTO: 4.47 10*3/MM3 (ref 3.4–10.8)

## 2024-05-07 PROCEDURE — 84466 ASSAY OF TRANSFERRIN: CPT | Performed by: INTERNAL MEDICINE

## 2024-05-07 PROCEDURE — 82607 VITAMIN B-12: CPT | Performed by: INTERNAL MEDICINE

## 2024-05-07 PROCEDURE — 83540 ASSAY OF IRON: CPT | Performed by: INTERNAL MEDICINE

## 2024-05-07 PROCEDURE — 1126F AMNT PAIN NOTED NONE PRSNT: CPT | Performed by: INTERNAL MEDICINE

## 2024-05-07 PROCEDURE — 85025 COMPLETE CBC W/AUTO DIFF WBC: CPT | Performed by: INTERNAL MEDICINE

## 2024-05-07 PROCEDURE — 3074F SYST BP LT 130 MM HG: CPT | Performed by: INTERNAL MEDICINE

## 2024-05-07 PROCEDURE — 36415 COLL VENOUS BLD VENIPUNCTURE: CPT

## 2024-05-07 PROCEDURE — 85046 RETICYTE/HGB CONCENTRATE: CPT | Performed by: INTERNAL MEDICINE

## 2024-05-07 PROCEDURE — 3078F DIAST BP <80 MM HG: CPT | Performed by: INTERNAL MEDICINE

## 2024-05-07 PROCEDURE — 99214 OFFICE O/P EST MOD 30 MIN: CPT | Performed by: INTERNAL MEDICINE

## 2024-05-07 PROCEDURE — 82728 ASSAY OF FERRITIN: CPT | Performed by: INTERNAL MEDICINE

## 2024-05-07 RX ORDER — GABAPENTIN 100 MG/1
100 CAPSULE ORAL 3 TIMES DAILY
COMMUNITY

## 2024-05-07 RX ORDER — METOCLOPRAMIDE 5 MG/1
5 TABLET ORAL 2 TIMES DAILY
Qty: 60 TABLET | Refills: 5 | Status: SHIPPED | OUTPATIENT
Start: 2024-05-07

## 2024-05-08 RX ORDER — SODIUM CHLORIDE 9 MG/ML
20 INJECTION, SOLUTION INTRAVENOUS ONCE
Status: CANCELLED | OUTPATIENT
Start: 2024-05-13

## 2024-05-08 RX ORDER — DIPHENHYDRAMINE HCL 25 MG
25 CAPSULE ORAL ONCE
Status: CANCELLED | OUTPATIENT
Start: 2024-05-17

## 2024-05-08 RX ORDER — ACETAMINOPHEN 325 MG/1
650 TABLET ORAL ONCE
Status: CANCELLED | OUTPATIENT
Start: 2024-05-13

## 2024-05-08 RX ORDER — SODIUM CHLORIDE 9 MG/ML
20 INJECTION, SOLUTION INTRAVENOUS ONCE
Status: CANCELLED | OUTPATIENT
Start: 2024-05-17

## 2024-05-08 RX ORDER — ACETAMINOPHEN 325 MG/1
650 TABLET ORAL ONCE
Status: CANCELLED | OUTPATIENT
Start: 2024-05-17

## 2024-05-08 RX ORDER — DIPHENHYDRAMINE HCL 25 MG
25 CAPSULE ORAL ONCE
Status: CANCELLED | OUTPATIENT
Start: 2024-05-13

## 2024-05-10 ENCOUNTER — TREATMENT (OUTPATIENT)
Age: 74
End: 2024-05-10
Payer: MEDICARE

## 2024-05-10 DIAGNOSIS — R26.89 DECREASED MOBILITY: ICD-10-CM

## 2024-05-10 DIAGNOSIS — M25.562 CHRONIC PAIN OF BOTH KNEES: ICD-10-CM

## 2024-05-10 DIAGNOSIS — R53.81 PHYSICAL DECONDITIONING: ICD-10-CM

## 2024-05-10 DIAGNOSIS — G89.29 CHRONIC BILATERAL LOW BACK PAIN WITHOUT SCIATICA: ICD-10-CM

## 2024-05-10 DIAGNOSIS — R26.89 BALANCE PROBLEM: Primary | ICD-10-CM

## 2024-05-10 DIAGNOSIS — G89.29 CHRONIC PAIN OF BOTH KNEES: ICD-10-CM

## 2024-05-10 DIAGNOSIS — M54.50 CHRONIC BILATERAL LOW BACK PAIN WITHOUT SCIATICA: ICD-10-CM

## 2024-05-10 DIAGNOSIS — M25.561 CHRONIC PAIN OF BOTH KNEES: ICD-10-CM

## 2024-05-13 ENCOUNTER — INFUSION (OUTPATIENT)
Dept: ONCOLOGY | Facility: HOSPITAL | Age: 74
End: 2024-05-13
Payer: MEDICARE

## 2024-05-13 VITALS
DIASTOLIC BLOOD PRESSURE: 68 MMHG | RESPIRATION RATE: 15 BRPM | HEIGHT: 77 IN | OXYGEN SATURATION: 99 % | TEMPERATURE: 97.8 F | BODY MASS INDEX: 20.33 KG/M2 | HEART RATE: 77 BPM | SYSTOLIC BLOOD PRESSURE: 127 MMHG | WEIGHT: 172.2 LBS

## 2024-05-13 DIAGNOSIS — D50.9 IRON DEFICIENCY ANEMIA, UNSPECIFIED IRON DEFICIENCY ANEMIA TYPE: Primary | ICD-10-CM

## 2024-05-13 DIAGNOSIS — K90.9 MALABSORPTION OF IRON: ICD-10-CM

## 2024-05-13 PROCEDURE — 25810000003 SODIUM CHLORIDE 0.9 % SOLUTION: Performed by: INTERNAL MEDICINE

## 2024-05-13 PROCEDURE — 63710000001 ACETAMINOPHEN 325 MG TABLET: Performed by: INTERNAL MEDICINE

## 2024-05-13 PROCEDURE — 63710000001 DIPHENHYDRAMINE PER 50 MG: Performed by: INTERNAL MEDICINE

## 2024-05-13 PROCEDURE — A9270 NON-COVERED ITEM OR SERVICE: HCPCS | Performed by: INTERNAL MEDICINE

## 2024-05-13 PROCEDURE — 25010000002 IRON SUCROSE PER 1 MG: Performed by: INTERNAL MEDICINE

## 2024-05-13 PROCEDURE — 96374 THER/PROPH/DIAG INJ IV PUSH: CPT

## 2024-05-13 RX ORDER — DIPHENHYDRAMINE HCL 25 MG
25 CAPSULE ORAL ONCE
Status: COMPLETED | OUTPATIENT
Start: 2024-05-13 | End: 2024-05-13

## 2024-05-13 RX ORDER — SODIUM CHLORIDE 9 MG/ML
20 INJECTION, SOLUTION INTRAVENOUS ONCE
Status: COMPLETED | OUTPATIENT
Start: 2024-05-13 | End: 2024-05-13

## 2024-05-13 RX ORDER — ACETAMINOPHEN 325 MG/1
650 TABLET ORAL ONCE
Status: COMPLETED | OUTPATIENT
Start: 2024-05-13 | End: 2024-05-13

## 2024-05-13 RX ADMIN — ACETAMINOPHEN 650 MG: 325 TABLET ORAL at 14:27

## 2024-05-13 RX ADMIN — SODIUM CHLORIDE 20 ML/HR: 9 INJECTION, SOLUTION INTRAVENOUS at 14:29

## 2024-05-13 RX ADMIN — DIPHENHYDRAMINE HYDROCHLORIDE 25 MG: 25 CAPSULE ORAL at 14:27

## 2024-05-13 RX ADMIN — IRON SUCROSE 200 MG: 20 INJECTION, SOLUTION INTRAVENOUS at 14:48

## 2024-05-14 ENCOUNTER — TREATMENT (OUTPATIENT)
Age: 74
End: 2024-05-14
Payer: MEDICARE

## 2024-05-14 DIAGNOSIS — R53.81 PHYSICAL DECONDITIONING: ICD-10-CM

## 2024-05-14 DIAGNOSIS — G89.29 CHRONIC PAIN OF BOTH KNEES: ICD-10-CM

## 2024-05-14 DIAGNOSIS — M25.562 CHRONIC PAIN OF BOTH KNEES: ICD-10-CM

## 2024-05-14 DIAGNOSIS — G89.29 CHRONIC BILATERAL LOW BACK PAIN WITHOUT SCIATICA: ICD-10-CM

## 2024-05-14 DIAGNOSIS — R26.89 DECREASED MOBILITY: ICD-10-CM

## 2024-05-14 DIAGNOSIS — M25.561 CHRONIC PAIN OF BOTH KNEES: ICD-10-CM

## 2024-05-14 DIAGNOSIS — M54.50 CHRONIC BILATERAL LOW BACK PAIN WITHOUT SCIATICA: ICD-10-CM

## 2024-05-14 DIAGNOSIS — R26.89 BALANCE PROBLEM: Primary | ICD-10-CM

## 2024-05-14 PROCEDURE — 97110 THERAPEUTIC EXERCISES: CPT | Performed by: PHYSICAL THERAPIST

## 2024-05-14 PROCEDURE — 97112 NEUROMUSCULAR REEDUCATION: CPT | Performed by: PHYSICAL THERAPIST

## 2024-05-14 NOTE — PROGRESS NOTES
Physical Therapy Treatment Note  [facility] [place]   [address] [place] [address]  P: [phone]       F: [phone]      Patient: Jose Hurtado   : 1950  Treatment Diagnosis:     ICD-10-CM ICD-9-CM   1. Balance problem  R26.89 781.99   2. Decreased mobility  R26.89 781.99   3. Physical deconditioning  R53.81 799.3   4. Chronic pain of both knees  M25.561 719.46    M25.562 338.29    G89.29    5. Chronic bilateral low back pain without sciatica  M54.50 724.2    G89.29 338.29     Referring practitioner: Brandin Bolton MD  Date of Initial Visit: Type: THERAPY  Noted: 2024  Today's Date: 2024  Patient seen for 3 sessions           Subjective   Patient reports he is feeling good today and did not have pain after stretching last session.      Objective     See Exercise, Manual, and Modality Logs for complete treatment.       Assessment/Plan  Patient performed program with progressed exercise parameters and additional strengthening exercises.  He tolerated progression well with no adverse symptoms.  Will continue to progress as tolerated.  Progress per Plan of Care and Progress strengthening /stabilization /functional activity           Timed:         Manual Therapy:         mins  74700;     Therapeutic Exercise:    30     mins  45283;     Neuromuscular Wil:    8    mins  82216;    Therapeutic Activity:          mins  26976;     Gait Training:           mins  71423;     Ultrasound:          mins  25051;    Ionto                                  mins  69895  Self Care                            mins  24296  Canalith Repos         mins  96953  Orthotic MGMT/Train         mins  43620    Un-Timed:  Electrical Stimulation:         mins  99470 ( );  Dry Needling:          mins  01943 self-pay;  Dry Needling:          mins  70652 self-pay  Traction          mins  75096      Timed Treatment:   38   mins   Total Treatment:     38   mins        PT SIGNATURE:  Mervat Ramachandran, PT     License Number: [id number]  Electronically signed by Mervat Ramachandran PT, 05/14/24, 9:00 AM EDT

## 2024-05-15 ENCOUNTER — INFUSION (OUTPATIENT)
Dept: ONCOLOGY | Facility: HOSPITAL | Age: 74
End: 2024-05-15
Payer: MEDICARE

## 2024-05-15 VITALS
RESPIRATION RATE: 15 BRPM | OXYGEN SATURATION: 99 % | SYSTOLIC BLOOD PRESSURE: 113 MMHG | WEIGHT: 172.4 LBS | BODY MASS INDEX: 20.36 KG/M2 | HEART RATE: 82 BPM | TEMPERATURE: 97.8 F | HEIGHT: 77 IN | DIASTOLIC BLOOD PRESSURE: 57 MMHG

## 2024-05-15 DIAGNOSIS — K90.9 MALABSORPTION OF IRON: ICD-10-CM

## 2024-05-15 DIAGNOSIS — D50.9 IRON DEFICIENCY ANEMIA, UNSPECIFIED IRON DEFICIENCY ANEMIA TYPE: Primary | ICD-10-CM

## 2024-05-15 PROCEDURE — 96374 THER/PROPH/DIAG INJ IV PUSH: CPT

## 2024-05-15 PROCEDURE — 25010000002 IRON SUCROSE PER 1 MG: Performed by: INTERNAL MEDICINE

## 2024-05-15 PROCEDURE — A9270 NON-COVERED ITEM OR SERVICE: HCPCS | Performed by: INTERNAL MEDICINE

## 2024-05-15 PROCEDURE — 63710000001 ACETAMINOPHEN 325 MG TABLET: Performed by: INTERNAL MEDICINE

## 2024-05-15 PROCEDURE — 63710000001 DIPHENHYDRAMINE PER 50 MG: Performed by: INTERNAL MEDICINE

## 2024-05-15 PROCEDURE — 25810000003 SODIUM CHLORIDE 0.9 % SOLUTION: Performed by: INTERNAL MEDICINE

## 2024-05-15 RX ORDER — ACETAMINOPHEN 325 MG/1
650 TABLET ORAL ONCE
Status: COMPLETED | OUTPATIENT
Start: 2024-05-15 | End: 2024-05-15

## 2024-05-15 RX ORDER — SODIUM CHLORIDE 9 MG/ML
20 INJECTION, SOLUTION INTRAVENOUS ONCE
Status: COMPLETED | OUTPATIENT
Start: 2024-05-15 | End: 2024-05-15

## 2024-05-15 RX ORDER — DIPHENHYDRAMINE HCL 25 MG
25 CAPSULE ORAL ONCE
Status: COMPLETED | OUTPATIENT
Start: 2024-05-15 | End: 2024-05-15

## 2024-05-15 RX ADMIN — ACETAMINOPHEN 650 MG: 325 TABLET ORAL at 14:16

## 2024-05-15 RX ADMIN — DIPHENHYDRAMINE HYDROCHLORIDE 25 MG: 25 CAPSULE ORAL at 14:16

## 2024-05-15 RX ADMIN — SODIUM CHLORIDE 20 ML/HR: 9 INJECTION, SOLUTION INTRAVENOUS at 14:16

## 2024-05-15 RX ADMIN — IRON SUCROSE 200 MG: 20 INJECTION, SOLUTION INTRAVENOUS at 14:45

## 2024-05-17 ENCOUNTER — TREATMENT (OUTPATIENT)
Age: 74
End: 2024-05-17
Payer: MEDICARE

## 2024-05-17 ENCOUNTER — INFUSION (OUTPATIENT)
Dept: ONCOLOGY | Facility: HOSPITAL | Age: 74
End: 2024-05-17
Payer: MEDICARE

## 2024-05-17 VITALS
RESPIRATION RATE: 15 BRPM | HEART RATE: 81 BPM | SYSTOLIC BLOOD PRESSURE: 143 MMHG | HEIGHT: 77 IN | WEIGHT: 171.8 LBS | BODY MASS INDEX: 20.29 KG/M2 | OXYGEN SATURATION: 99 % | TEMPERATURE: 97.1 F | DIASTOLIC BLOOD PRESSURE: 71 MMHG

## 2024-05-17 DIAGNOSIS — D50.9 IRON DEFICIENCY ANEMIA, UNSPECIFIED IRON DEFICIENCY ANEMIA TYPE: Primary | ICD-10-CM

## 2024-05-17 DIAGNOSIS — R26.89 BALANCE PROBLEM: Primary | ICD-10-CM

## 2024-05-17 DIAGNOSIS — G89.29 CHRONIC PAIN OF BOTH KNEES: ICD-10-CM

## 2024-05-17 DIAGNOSIS — M54.50 CHRONIC BILATERAL LOW BACK PAIN WITHOUT SCIATICA: ICD-10-CM

## 2024-05-17 DIAGNOSIS — R53.81 PHYSICAL DECONDITIONING: ICD-10-CM

## 2024-05-17 DIAGNOSIS — R26.89 DECREASED MOBILITY: ICD-10-CM

## 2024-05-17 DIAGNOSIS — M25.561 CHRONIC PAIN OF BOTH KNEES: ICD-10-CM

## 2024-05-17 DIAGNOSIS — K90.9 MALABSORPTION OF IRON: ICD-10-CM

## 2024-05-17 DIAGNOSIS — G89.29 CHRONIC BILATERAL LOW BACK PAIN WITHOUT SCIATICA: ICD-10-CM

## 2024-05-17 DIAGNOSIS — M25.562 CHRONIC PAIN OF BOTH KNEES: ICD-10-CM

## 2024-05-17 PROCEDURE — 63710000001 DIPHENHYDRAMINE PER 50 MG: Performed by: INTERNAL MEDICINE

## 2024-05-17 PROCEDURE — 25010000002 IRON SUCROSE PER 1 MG: Performed by: INTERNAL MEDICINE

## 2024-05-17 PROCEDURE — A9270 NON-COVERED ITEM OR SERVICE: HCPCS | Performed by: INTERNAL MEDICINE

## 2024-05-17 PROCEDURE — 63710000001 ACETAMINOPHEN 325 MG TABLET: Performed by: INTERNAL MEDICINE

## 2024-05-17 PROCEDURE — 25810000003 SODIUM CHLORIDE 0.9 % SOLUTION: Performed by: INTERNAL MEDICINE

## 2024-05-17 PROCEDURE — 96374 THER/PROPH/DIAG INJ IV PUSH: CPT

## 2024-05-17 RX ORDER — ACETAMINOPHEN 325 MG/1
650 TABLET ORAL ONCE
Status: COMPLETED | OUTPATIENT
Start: 2024-05-17 | End: 2024-05-17

## 2024-05-17 RX ORDER — DIPHENHYDRAMINE HCL 25 MG
25 CAPSULE ORAL ONCE
Status: COMPLETED | OUTPATIENT
Start: 2024-05-17 | End: 2024-05-17

## 2024-05-17 RX ORDER — SODIUM CHLORIDE 9 MG/ML
20 INJECTION, SOLUTION INTRAVENOUS ONCE
Status: COMPLETED | OUTPATIENT
Start: 2024-05-17 | End: 2024-05-17

## 2024-05-17 RX ADMIN — DIPHENHYDRAMINE HYDROCHLORIDE 25 MG: 25 CAPSULE ORAL at 14:08

## 2024-05-17 RX ADMIN — SODIUM CHLORIDE 20 ML/HR: 9 INJECTION, SOLUTION INTRAVENOUS at 14:08

## 2024-05-17 RX ADMIN — IRON SUCROSE 200 MG: 20 INJECTION, SOLUTION INTRAVENOUS at 14:25

## 2024-05-17 RX ADMIN — ACETAMINOPHEN 650 MG: 325 TABLET ORAL at 14:08

## 2024-05-17 NOTE — PROGRESS NOTES
Physical Therapy Treatment Note  [facility] Physical [facility] [place]   [address] [place] [address]  P: [phone]       F: [phone]      Patient: Jose Hurtado   : 1950  Treatment Diagnosis:     ICD-10-CM ICD-9-CM   1. Balance problem  R26.89 781.99   2. Decreased mobility  R26.89 781.99   3. Physical deconditioning  R53.81 799.3   4. Chronic pain of both knees  M25.561 719.46    M25.562 338.29    G89.29    5. Chronic bilateral low back pain without sciatica  M54.50 724.2    G89.29 338.29     Referring practitioner: Brandin Bolton MD  Date of Initial Visit: Type: THERAPY  Noted: 2024  Today's Date: 2024  Patient seen for 4 sessions           Subjective   Patient reports his legs feel like they are getting stronger.  Still has pain in his back if he stands for a long time.    Objective     See Exercise, Manual, and Modality Logs for complete treatment.       Assessment/Plan  Patient performed program with progressed parameters and exercised for strengthening.  He tolerated exercise progression well without any adverse symptoms.  Will continue to progress as tolerated.  Progress per Plan of Care and Progress strengthening /stabilization /functional activity           Timed:         Manual Therapy:         mins  10218;     Therapeutic Exercise:    30     mins  83991;     Neuromuscular Wil:    8    mins  15469;    Therapeutic Activity:          mins  54244;     Gait Training:           mins  54354;     Ultrasound:          mins  82783;    Ionto                                  mins  26498  Self Care                            mins  07452  Canalith Repos         mins  30919  Orthotic MGMT/Train         mins  48615    Un-Timed:  Electrical Stimulation:         mins  70946 ( );  Dry Needling:          mins  52846 self-pay;  Dry Needling:          mins  29765 self-pay  Traction          mins  15881      Timed Treatment:   38   mins   Total Treatment:     38    mins        PT SIGNATURE: Mervat Ramachandran PT     License Number: [id number]  Electronically signed by Mervat Ramachandran PT, 05/17/24, 9:39 AM EDT

## 2024-05-21 ENCOUNTER — TREATMENT (OUTPATIENT)
Age: 74
End: 2024-05-21
Payer: MEDICARE

## 2024-05-21 DIAGNOSIS — R26.89 DECREASED MOBILITY: ICD-10-CM

## 2024-05-21 DIAGNOSIS — G89.29 CHRONIC PAIN OF BOTH KNEES: ICD-10-CM

## 2024-05-21 DIAGNOSIS — R53.81 PHYSICAL DECONDITIONING: ICD-10-CM

## 2024-05-21 DIAGNOSIS — R26.89 BALANCE PROBLEM: Primary | ICD-10-CM

## 2024-05-21 DIAGNOSIS — M25.561 CHRONIC PAIN OF BOTH KNEES: ICD-10-CM

## 2024-05-21 DIAGNOSIS — M25.562 CHRONIC PAIN OF BOTH KNEES: ICD-10-CM

## 2024-05-21 DIAGNOSIS — G89.29 CHRONIC BILATERAL LOW BACK PAIN WITHOUT SCIATICA: ICD-10-CM

## 2024-05-21 DIAGNOSIS — M54.50 CHRONIC BILATERAL LOW BACK PAIN WITHOUT SCIATICA: ICD-10-CM

## 2024-05-21 PROCEDURE — 97530 THERAPEUTIC ACTIVITIES: CPT | Performed by: PHYSICAL THERAPIST

## 2024-05-21 PROCEDURE — 97110 THERAPEUTIC EXERCISES: CPT | Performed by: PHYSICAL THERAPIST

## 2024-05-21 NOTE — PROGRESS NOTES
Physical Therapy Re-Assessment   [facility] Physical Therapy [place]   [address] [place] [address]  P: [phone]       F: [phone]        Patient: Jose Hurtado   : 1950  Visit Diagnoses:     ICD-10-CM ICD-9-CM   1. Balance problem  R26.89 781.99   2. Decreased mobility  R26.89 781.99   3. Physical deconditioning  R53.81 799.3   4. Chronic pain of both knees  M25.561 719.46    M25.562 338.29    G89.29    5. Chronic bilateral low back pain without sciatica  M54.50 724.2    G89.29 338.29     Referring practitioner: Brandin Bolton MD  Date of Initial Visit: Type: THERAPY  Noted: 2024  Today's Date: 2024  Patient seen for 5 sessions      Subjective:   Jose Hurtado reports:   Subjective Questionnaire: ABC: 38%  Clinical Progress: improved  Home Program Compliance: Yes  Treatment has included: therapeutic exercise, neuromuscular re-education, manual therapy, and therapeutic activity    Subjective   Patient reports he feels stronger, still having pain in his back with walking.  States he has been able to walk around the house without his cane or walking stick.    Objective     Palpation   Left   Hypertonic in the iliopsoas.   Tenderness of the iliopsoas and lumbar paraspinals.      Right   Hypertonic in the iliopsoas.   Tenderness of the iliopsoas and lumbar paraspinals.      Tenderness   No TTP of knees     Active Range of Motion   Left Hip   Extension: 10 (LAG) degrees      Right Hip   Extension: 15 (LAG) degrees     Left Knee   Extensor lag: 15 degrees      Right Knee   Extensor lag: 10 degrees      Additional Active Range of Motion Details  Lumbar ROM is limited, grossly 50%.     Strength/Myotome Testing      Left Hip   Planes of Motion   Flexion: 3+  Extension: 4  Abduction: 4  External rotation: 4  Internal rotation: 4     Right Hip   Planes of Motion   Flexion: 4  Extension: 4  Abduction: 4  External rotation: 4  Internal rotation: 4     Left Knee   Flexion:  5  Extension: 5     Right Knee   Flexion: 5  Extension: 5     Ambulation      Observational Gait   Gait: antalgic   Decreased walking speed.      Quality of Movement During Gait   Trunk  Forward lean.      Functional Assessment      Comments  5XSTS (24 inch height table): 18.45 seconds (used both hand to push up from table and then on thighs to straighten back)     TUG (from 24 inch height table): 12.97 seconds (used left hand to push up from table, no AD)     See Exercise, Manual, and Modality Logs for complete treatment.     Assessment/Plan  Patient demonstrates improved ROM and strength.  His functional mobility has improved as well.  Still has gait deviations although is less reliant on AD.  His ABC score is lower although he reports feeling stronger and more mobile.  Recently transitioning to not using an AD which may be a factor in his lower score.  He is progressing with treatment and will benefit from continued PT.   Progress toward previous goals: Partially Met    Goal Review   Short Term Goals (6 wks):  1.  Patient will have increased lumbar spine ROM to WFL.-ongoing  2.  Patient will have increased bilateral hip extension to 0 degrees.-progressing  3.  Patient will have increased bilateral knee extension to 0 degrees.-progressing  4.  Patient will normalized tone in bilateral iliopsoas.-progressing     Long Term Goals (8 wks):  1.  Patient will be independent in performance of HEP.-progressing  2.  Patient will have improved ABC score of 60% or better.-progressing  3.  Patient will have improved 5XSTS test time of 30  seconds or less.-met  4.  Patient will have improved TUG test time 18 seconds or less.-met      Recommendations: Continue as planned  Timeframe: 2 months  Prognosis to achieve goals: good    PT SIGNATURE: Mervat Ramachandran PT     License Number: [id number]  Electronically signed by Mervat Ramachandran PT, 05/21/24, 9:30 AM EDT        Timed:         Manual Therapy:         mins  10036;     Therapeutic  Exercise:    30     mins  38451;     Neuromuscular Wil:        mins  92478;    Therapeutic Activity:     15     mins  07290;     Gait Training:           mins  06412;     Ultrasound:          mins  78453;    Ionto                                  mins  42811  Self Care                            mins  97285  Canalith Repos         mins  56927  Orthotic MGMT/Train         mins  02655    Un-Timed:  Electrical Stimulation:         mins  07097 ( );  Dry Needling:          mins  44128 self-pay;  Dry Needling:          mins  19065 self-pay  Traction          mins  62783  Low Eval          mins  78260  Mod Eval          mins  78973  High Eval                            mins  47850    Timed Treatment:   45   mins   Total Treatment:     45   mins

## 2024-05-23 ENCOUNTER — OFFICE VISIT (OUTPATIENT)
Dept: SURGERY | Facility: CLINIC | Age: 74
End: 2024-05-23
Payer: MEDICARE

## 2024-05-23 VITALS
SYSTOLIC BLOOD PRESSURE: 118 MMHG | BODY MASS INDEX: 21.34 KG/M2 | OXYGEN SATURATION: 97 % | DIASTOLIC BLOOD PRESSURE: 62 MMHG | WEIGHT: 180 LBS | HEART RATE: 83 BPM

## 2024-05-23 DIAGNOSIS — Z85.01 HISTORY OF ESOPHAGEAL CANCER: Primary | Chronic | ICD-10-CM

## 2024-05-23 DIAGNOSIS — R91.8 GROUND GLASS OPACITY PRESENT ON IMAGING OF LUNG: ICD-10-CM

## 2024-05-23 NOTE — PROGRESS NOTES
"Chief Complaint  Esophageal cancer s/p resection in 2015    Subjective        Jose Hurtado presents to [facility] THORACIC SURGERY  History of Present Illness    Mr. Hurtado is a very pleasant 74-year-old gentleman who is status post Andrea Joe esophagectomy by Dr. Gonzalez in April 2015.  He presents today for surveillance.  He has had difficulties with dysphagia in the past and has undergone EGD with dilations, this recently by Dr. Christine in November 2021.  He has no dysphagia currently.  His weight is approximately 180lbs which he prefers to be closer to 200lbs. He is active in his daily life.  He is a retired .  He presents today with CT chest abdomen pelvis without complaint.    Objective   Vital Signs:  /62 (BP Location: Left arm, Patient Position: Sitting, Cuff Size: Adult)   Pulse 83   Wt 81.6 kg (180 lb)   SpO2 97%   BMI 21.34 kg/m²   Estimated body mass index is 21.34 kg/m² as calculated from the following:    Height as of 5/17/24: 195.6 cm (77\").    Weight as of this encounter: 81.6 kg (180 lb).       BMI is within normal parameters. No other follow-up for BMI required.      Physical Exam  Vitals and nursing note reviewed.   Constitutional:       Appearance: He is well-developed.   HENT:      Head: Normocephalic and atraumatic.      Nose: Nose normal.   Eyes:      Conjunctiva/sclera: Conjunctivae normal.   Pulmonary:      Effort: Pulmonary effort is normal.   Musculoskeletal:         General: Normal range of motion.      Cervical back: Normal range of motion and neck supple.   Skin:     General: Skin is warm and dry.   Neurological:      Mental Status: He is alert and oriented to person, place, and time.   Psychiatric:         Behavior: Behavior normal.         Thought Content: Thought content normal.         Judgment: Judgment normal.        Result Review :    CT chest abdomen pelvis 4/4/2024: No evidence of metastatic disease.  Postoperative changes of esophagectomy.  " Groundglass opacities in the left lower lobe.  No pleural or pericardial effusion.             Assessment and Plan   Diagnoses and all orders for this visit:    1. History of esophageal cancer (Primary)  -     CT Chest Without Contrast; Future    2. Ground glass opacity present on imaging of lung  -     CT Chest Without Contrast; Future      Assessment & Plan     Mr. Jose Hurtado is a pleasant 73-year-old gentleman status post esophagectomy in 2015.  His most recent CT chest demonstrates some vague groundglass opacities in the left lower lobe, likely infectious/inflammatory.  I will order CT chest to be performed in approximately 6 months to ensure clearing.  He is in agreement with this plan.  We will continue surveillance imaging at that time.         I spent 31 minutes caring for Jose on this date of service. This time includes time spent by me in the following activities:preparing for the visit, reviewing tests, obtaining and/or reviewing a separately obtained history, performing a medically appropriate examination and/or evaluation , counseling and educating the patient/family/caregiver, ordering medications, tests, or procedures, documenting information in the medical record and independently interpreting results and communicating that information with the patient/family/caregiver    Follow Up   Return in about 6 months (around 11/23/2024) for Next scheduled follow up.  Patient was given instructions and counseling regarding his condition or for health maintenance advice. Please see specific information pulled into the AVS if appropriate.

## 2024-05-24 ENCOUNTER — TREATMENT (OUTPATIENT)
Age: 74
End: 2024-05-24
Payer: MEDICARE

## 2024-05-24 DIAGNOSIS — R26.89 DECREASED MOBILITY: ICD-10-CM

## 2024-05-24 DIAGNOSIS — G89.29 CHRONIC BILATERAL LOW BACK PAIN WITHOUT SCIATICA: ICD-10-CM

## 2024-05-24 DIAGNOSIS — G89.29 CHRONIC PAIN OF BOTH KNEES: ICD-10-CM

## 2024-05-24 DIAGNOSIS — M25.562 CHRONIC PAIN OF BOTH KNEES: ICD-10-CM

## 2024-05-24 DIAGNOSIS — M25.561 CHRONIC PAIN OF BOTH KNEES: ICD-10-CM

## 2024-05-24 DIAGNOSIS — R26.89 BALANCE PROBLEM: Primary | ICD-10-CM

## 2024-05-24 DIAGNOSIS — M54.50 CHRONIC BILATERAL LOW BACK PAIN WITHOUT SCIATICA: ICD-10-CM

## 2024-05-24 DIAGNOSIS — R53.81 PHYSICAL DECONDITIONING: ICD-10-CM

## 2024-05-24 NOTE — PROGRESS NOTES
Physical Therapy Treatment Note  [facility] Physical Therapy [place]   [address] [place] [address]  P: [phone]       F: [phone]      Patient: Jose Hurtado   : 1950  Treatment Diagnosis:     ICD-10-CM ICD-9-CM   1. Balance problem  R26.89 781.99   2. Decreased mobility  R26.89 781.99   3. Physical deconditioning  R53.81 799.3   4. Chronic pain of both knees  M25.561 719.46    M25.562 338.29    G89.29    5. Chronic bilateral low back pain without sciatica  M54.50 724.2    G89.29 338.29     Referring practitioner: Brandin Bolton MD  Date of Initial Visit: Type: THERAPY  Noted: 2024  Today's Date: 2024  Patient seen for 6 sessions           Subjective   Patient reports he has been feeling better.     Objective     See Exercise, Manual, and Modality Logs for complete treatment.       Assessment/Plan  Patient performed program with progressed parameters.  He had some increased LE edema this session with back up below and behind left knee.  He has improved ROM following manual therapy.  Will continue to progress as tolerated.   Progress per Plan of Care and Progress strengthening /stabilization /functional activity           Timed:         Manual Therapy:    13     mins  66937;     Therapeutic Exercise:    20     mins  60420;     Neuromuscular Wil:    8    mins  69091;    Therapeutic Activity:          mins  34448;     Gait Training:           mins  21878;     Ultrasound:          mins  34816;    Ionto                                  mins  48129  Self Care                            mins  61763  Canalith Repos         mins  79244  Orthotic MGMT/Train         mins  94714    Un-Timed:  Electrical Stimulation:         mins  87026 ( );  Dry Needling:          mins  75440 self-pay;  Dry Needling:          mins  63559 self-pay  Traction          mins  51538      Timed Treatment:   41   mins   Total Treatment:     41   mins        PT SIGNATURE: Mervat Ramachandran PT      License Number: [id number]  Electronically signed by Mervat Ramachandran PT, 05/24/24, 9:30 AM EDT

## 2024-05-28 ENCOUNTER — TREATMENT (OUTPATIENT)
Age: 74
End: 2024-05-28
Payer: MEDICARE

## 2024-05-28 DIAGNOSIS — R26.89 BALANCE PROBLEM: Primary | ICD-10-CM

## 2024-05-28 DIAGNOSIS — R26.89 DECREASED MOBILITY: ICD-10-CM

## 2024-05-28 DIAGNOSIS — R53.81 PHYSICAL DECONDITIONING: ICD-10-CM

## 2024-05-28 PROCEDURE — 97140 MANUAL THERAPY 1/> REGIONS: CPT | Performed by: PHYSICAL THERAPIST

## 2024-05-28 PROCEDURE — 97112 NEUROMUSCULAR REEDUCATION: CPT | Performed by: PHYSICAL THERAPIST

## 2024-05-28 PROCEDURE — 97110 THERAPEUTIC EXERCISES: CPT | Performed by: PHYSICAL THERAPIST

## 2024-05-28 NOTE — PROGRESS NOTES
Physical Therapy Daily Treatment Note      [address]  [facility] [address]  [phone]  Patient: Jose Hurtado   : 1950  Referring practitioner: No ref. provider found   Date of Initial Visit: Type: THERAPY  Noted: 2024  Today's Date: 2024  Patient seen for 7 sessions          Visit Diagnoses:    ICD-10-CM ICD-9-CM   1. Balance problem  R26.89 781.99   2. Decreased mobility  R26.89 781.99   3. Physical deconditioning  R53.81 799.3       Subjective   Doing pretty good today.    Objective   See Exercise, Manual, and Modality Logs for complete treatment.   Knee ext lag - R = 9, L = 15 with OP    Assessment/Plan  Reporting good tolerance for current PT exercises and HEP.  Continues with very altered gait with trunk flexion and significant genu valgus.  Still lacking moderately with knee extension with tight HS and ITB.    Continue to progress as laron per POC    Timed:         Manual Therapy:    12     mins  95619;     Therapeutic Exercise:    18     mins  52005;     Neuromuscular Wil:    8    mins  16969;    Therapeutic Activity:     0     mins  64073;     Gait Trainin     mins  92950;     Ultrasound:     0     mins  50540;    Ionto                               0    mins   18574  Self Care                       0     mins   69515      Un-Timed:  Electrical Stimulation:    0     mins  01776 ( );  Dry Needling     0     mins self-pay  Traction     0     mins 12354  Canalith Repos    0     mins 87887    Timed Treatment:   38   mins   Total Treatment:     38   mins    Reva Barger PT  [place] License: [id number]

## 2024-05-31 ENCOUNTER — TREATMENT (OUTPATIENT)
Age: 74
End: 2024-05-31
Payer: MEDICARE

## 2024-05-31 DIAGNOSIS — R26.89 DECREASED MOBILITY: ICD-10-CM

## 2024-05-31 DIAGNOSIS — R53.81 PHYSICAL DECONDITIONING: ICD-10-CM

## 2024-05-31 DIAGNOSIS — M54.50 CHRONIC BILATERAL LOW BACK PAIN WITHOUT SCIATICA: ICD-10-CM

## 2024-05-31 DIAGNOSIS — R26.89 BALANCE PROBLEM: Primary | ICD-10-CM

## 2024-05-31 DIAGNOSIS — G89.29 CHRONIC PAIN OF BOTH KNEES: ICD-10-CM

## 2024-05-31 DIAGNOSIS — M25.562 CHRONIC PAIN OF BOTH KNEES: ICD-10-CM

## 2024-05-31 DIAGNOSIS — M25.561 CHRONIC PAIN OF BOTH KNEES: ICD-10-CM

## 2024-05-31 DIAGNOSIS — G89.29 CHRONIC BILATERAL LOW BACK PAIN WITHOUT SCIATICA: ICD-10-CM

## 2024-05-31 NOTE — PROGRESS NOTES
Physical Therapy Treatment Note  [facility] Physical Therapy [place]   [address] [place] [address]  P: [phone]       F: [phone]      Patient: Jose Hurtado   : 1950  Treatment Diagnosis:     ICD-10-CM ICD-9-CM   1. Balance problem  R26.89 781.99   2. Decreased mobility  R26.89 781.99   3. Physical deconditioning  R53.81 799.3   4. Chronic pain of both knees  M25.561 719.46    M25.562 338.29    G89.29    5. Chronic bilateral low back pain without sciatica  M54.50 724.2    G89.29 338.29     Referring practitioner: Brandin Bolton MD  Date of Initial Visit: Type: THERAPY  Noted: 2024  Today's Date: 2024  Patient seen for 8 sessions           Subjective   Patient reports he is tired today.  States he worked in the yard yesterday planting [employer].  Reports he has some right shoulder pain since working in the yard.    Objective     See Exercise, Manual, and Modality Logs for complete treatment.       Assessment/Plan  Patient performed program to tolerance.  Held weight from right UE due to shoulder pain.  Progressed dynamic balance and stabilization activities.  Will continue to progress as tolerated.  Progress per Plan of Care           Timed:         Manual Therapy:         mins  43174;     Therapeutic Exercise:    30     mins  31193;     Neuromuscular Wil:    10    mins  14978;    Therapeutic Activity:          mins  74303;     Gait Training:           mins  98501;     Ultrasound:          mins  72326;    Ionto                                  mins  05985  Self Care                            mins  55480  Canalith Repos         mins  09957  Orthotic MGMT/Train         mins  21241    Un-Timed:  Electrical Stimulation:         mins  07802 ( );  Dry Needling:          mins  83553 self-pay;  Dry Needling:          mins  31772 self-pay  Traction          mins  04038      Timed Treatment:   40   mins   Total Treatment:     40   mins        PT SIGNATURE: Mervat RUIZ  [place] PT     License Number: [id number]  Electronically signed by Mervat Ramachandran PT, 05/31/24, 10:00 AM EDT

## 2024-06-10 RX ORDER — APIXABAN 5 MG/1
TABLET, FILM COATED ORAL
Qty: 60 TABLET | Refills: 1 | Status: SHIPPED | OUTPATIENT
Start: 2024-06-10

## 2024-06-11 ENCOUNTER — TREATMENT (OUTPATIENT)
Age: 74
End: 2024-06-11
Payer: MEDICARE

## 2024-06-11 DIAGNOSIS — G89.29 CHRONIC BILATERAL LOW BACK PAIN WITHOUT SCIATICA: ICD-10-CM

## 2024-06-11 DIAGNOSIS — M25.561 CHRONIC PAIN OF BOTH KNEES: ICD-10-CM

## 2024-06-11 DIAGNOSIS — G89.29 CHRONIC PAIN OF BOTH KNEES: ICD-10-CM

## 2024-06-11 DIAGNOSIS — R53.81 PHYSICAL DECONDITIONING: ICD-10-CM

## 2024-06-11 DIAGNOSIS — R26.89 DECREASED MOBILITY: ICD-10-CM

## 2024-06-11 DIAGNOSIS — M54.50 CHRONIC BILATERAL LOW BACK PAIN WITHOUT SCIATICA: ICD-10-CM

## 2024-06-11 DIAGNOSIS — M25.562 CHRONIC PAIN OF BOTH KNEES: ICD-10-CM

## 2024-06-11 DIAGNOSIS — R26.89 BALANCE PROBLEM: Primary | ICD-10-CM

## 2024-06-11 NOTE — PROGRESS NOTES
Physical Therapy Treatment Note  [facility] Physical Therapy [place]   [address] [place] [address]  P: [phone]       F: [phone]      Patient: Jose Hurtado   : 1950  Treatment Diagnosis:     ICD-10-CM ICD-9-CM   1. Balance problem  R26.89 781.99   2. Decreased mobility  R26.89 781.99   3. Physical deconditioning  R53.81 799.3   4. Chronic pain of both knees  M25.561 719.46    M25.562 338.29    G89.29    5. Chronic bilateral low back pain without sciatica  M54.50 724.2    G89.29 338.29     Referring practitioner: Brandin Bolton MD  Date of Initial Visit: Type: THERAPY  Noted: 2024  Today's Date: 2024  Patient seen for 9 sessions           Subjective   Patient reports he has not had much energy for the past weeks.  States he has not had an appetite and no energy to do anything.  Reports he has mostly been resting in bed.  States he had an iron infusion last month.  States he slept wrong and has a neck pain and stiffness today.    Objective     See Exercise, Manual, and Modality Logs for complete treatment.       Assessment/Plan  Patient presents with increased trunk/hip/knee flexion in standing and during gait.  He also has increased bilateral hip IR with left LE add/IR during swing phase-left LE makes contact with right LE.  Performed program with progression of stretching exercises to target hip ER, abd, extension and knee extension. He has improved posture and gait following stretches.  Encouraged patient to consult PCP regarding his recent increase in fatigue and decreased appetite if is persists.  Progress per Plan of Care           Timed:         Manual Therapy:         mins  13571;     Therapeutic Exercise:    40     mins  55593;     Neuromuscular Wil:        mins  39717;    Therapeutic Activity:          mins  54805;     Gait Training:           mins  77264;     Ultrasound:          mins  93017;    Ionto                                  mins  61809  Self  Care                            mins  33698  Canalith Repos         mins  16348  Orthotic MGMT/Train         mins  08507    Un-Timed:  Electrical Stimulation:         mins  06081 ( );  Dry Needling:          mins  59601 self-pay;  Dry Needling:          mins  45705 self-pay  Traction          mins  88169      Timed Treatment:   40   mins   Total Treatment:     40   mins        PT SIGNATURE: Mervat Ramachandran PT     License Number: [id number]  Electronically signed by Mervat Ramachandran PT, 06/11/24, 10:50 AM EDT

## 2024-06-13 ENCOUNTER — TELEPHONE (OUTPATIENT)
Age: 74
End: 2024-06-13

## 2024-06-13 ENCOUNTER — OFFICE VISIT (OUTPATIENT)
Dept: INTERNAL MEDICINE | Age: 74
End: 2024-06-13
Payer: MEDICARE

## 2024-06-13 VITALS
WEIGHT: 165 LBS | BODY MASS INDEX: 19.48 KG/M2 | OXYGEN SATURATION: 98 % | DIASTOLIC BLOOD PRESSURE: 58 MMHG | SYSTOLIC BLOOD PRESSURE: 90 MMHG | TEMPERATURE: 97.1 F | HEART RATE: 97 BPM | HEIGHT: 77 IN

## 2024-06-13 DIAGNOSIS — G62.9 PERIPHERAL POLYNEUROPATHY: Chronic | ICD-10-CM

## 2024-06-13 DIAGNOSIS — G25.81 RLS (RESTLESS LEGS SYNDROME): Chronic | ICD-10-CM

## 2024-06-13 DIAGNOSIS — E55.9 VITAMIN D DEFICIENCY: ICD-10-CM

## 2024-06-13 DIAGNOSIS — E53.8 B12 DEFICIENCY: ICD-10-CM

## 2024-06-13 DIAGNOSIS — F33.41 RECURRENT MAJOR DEPRESSIVE DISORDER, IN PARTIAL REMISSION: Primary | Chronic | ICD-10-CM

## 2024-06-13 PROBLEM — I48.91 ATRIAL FIBRILLATION: Chronic | Status: ACTIVE | Noted: 2023-01-03

## 2024-06-13 PROCEDURE — G2211 COMPLEX E/M VISIT ADD ON: HCPCS | Performed by: INTERNAL MEDICINE

## 2024-06-13 PROCEDURE — 3074F SYST BP LT 130 MM HG: CPT | Performed by: INTERNAL MEDICINE

## 2024-06-13 PROCEDURE — 1126F AMNT PAIN NOTED NONE PRSNT: CPT | Performed by: INTERNAL MEDICINE

## 2024-06-13 PROCEDURE — 99214 OFFICE O/P EST MOD 30 MIN: CPT | Performed by: INTERNAL MEDICINE

## 2024-06-13 PROCEDURE — 3078F DIAST BP <80 MM HG: CPT | Performed by: INTERNAL MEDICINE

## 2024-06-13 RX ORDER — CYANOCOBALAMIN 1000 UG/ML
1000 INJECTION, SOLUTION INTRAMUSCULAR; SUBCUTANEOUS
Qty: 4 ML | Refills: 5 | Status: SHIPPED | OUTPATIENT
Start: 2024-06-13

## 2024-06-13 RX ORDER — FUROSEMIDE 20 MG/1
20 TABLET ORAL DAILY
COMMUNITY

## 2024-06-13 RX ORDER — ACETAMINOPHEN 160 MG
2000 TABLET,DISINTEGRATING ORAL DAILY
COMMUNITY
Start: 2024-06-13

## 2024-06-13 NOTE — PROGRESS NOTES
I N T E R N A L  M E D I C I N E    J U N O H  K I M,  M D      ENCOUNTER DATE:  06/13/2024    Jose Hurtado / 74 y.o. / male    CHIEF COMPLAINT / REASON FOR OFFICE VISIT     Fatigue      ASSESSMENT & PLAN     Problem List Items Addressed This Visit          High    Recurrent major depressive disorder, in partial remission - Primary (Chronic)    Current Assessment & Plan     Resumed paroxetine 40 mg daily. Doing better.          Relevant Medications    PARoxetine (PAXIL) 40 MG tablet    B12 deficiency (Chronic)    Current Assessment & Plan     Resume B12 injection (self). Prescription sent to pharmacy. May help with fatigue.          Relevant Medications    cyanocobalamin 1000 MCG/ML injection       Medium    Peripheral polyneuropathy (Chronic)    Overview     + severe B12 deficiency (6/7/19)         Current Assessment & Plan     Not taking B12 shots. Resume. Prescription sent.             Low    RLS (restless legs syndrome) (Chronic)    Overview     Continue pramipexole 1.5 mg two tabs nightly.          Relevant Medications    pramipexole (MIRAPEX) 1.5 MG tablet     Other Visit Diagnoses       Vitamin D deficiency        Relevant Medications    Cholecalciferol (Vitamin D3) 50 MCG (2000 UT) capsule          No orders of the defined types were placed in this encounter.    New Medications Ordered This Visit   Medications    cyanocobalamin 1000 MCG/ML injection     Sig: Inject 1 mL under the skin into the appropriate area as directed Every 30 (Thirty) Days. Indications: Inadequate Vitamin B12     Dispense:  4 mL     Refill:  5    Cholecalciferol (Vitamin D3) 50 MCG (2000 UT) capsule     Sig: Take 1 capsule by mouth Daily.       SUMMARY/DISCUSSION      Next Appointment with me: 11/6/2024    Return in about 8 weeks (around 8/5/2024) for Schedule AWV FOR AUGUST, (WITH APRN).      VITAL SIGNS     Vitals:    06/13/24 1202   BP: 90/58   Pulse: 97   Temp: 97.1 °F (36.2 °C)   SpO2: 98%   Weight: 74.8 kg (165 lb)   Height:  "195.6 cm (77\")       BP Readings from Last 3 Encounters:   06/13/24 90/58   05/23/24 118/62   05/17/24 143/71     Wt Readings from Last 3 Encounters:   06/13/24 74.8 kg (165 lb)   05/23/24 81.6 kg (180 lb)   05/17/24 77.9 kg (171 lb 12.8 oz)     Body mass index is 19.57 kg/m².    Blood pressure readings recorded on patient flowsheet:       No data to display                  MEDICATIONS AT THE TIME OF OFFICE VISIT     Current Outpatient Medications on File Prior to Visit   Medication Sig    acetaminophen (TYLENOL) 325 MG tablet Take 2 tablets by mouth Every 6 (Six) Hours As Needed for Mild Pain, Headache or Fever.    albuterol sulfate  (90 Base) MCG/ACT inhaler Inhale 1 puff Every 4 (Four) Hours As Needed for Wheezing.    atorvastatin (LIPITOR) 40 MG tablet Take 1 tablet by mouth Every Night. (Patient taking differently: Take 1 tablet by mouth Every Night.)    Eliquis 5 MG tablet tablet TAKE ONE TABLET BY MOUTH EVERY 12 HOURS    EPINEPHrine, Anaphylaxis, (ADRENALIN) 1 MG/ML injection Inject 0.3 mL into the appropriate muscle as directed by prescriber As Needed for Anaphylaxis.    furosemide (LASIX) 20 MG tablet Take 1 tablet by mouth Daily.    gabapentin (Neurontin) 100 MG capsule Take 1 capsule by mouth 3 (Three) Times a Day.    metoclopramide (Reglan) 5 MG tablet Take 1 tablet by mouth 2 (Two) Times a Day. You will need to schedule an office apt for further refills    PARoxetine (PAXIL) 40 MG tablet TAKE ONE TABLET BY MOUTH EVERY MORNING    polyethylene glycol (MIRALAX) 17 g packet Take 17 g by mouth Daily. (Patient taking differently: Take 17 g by mouth Daily.)    pramipexole (MIRAPEX) 1.5 MG tablet Take 1 tablet by mouth 2 (Two) Times a Day. Indications: Restless Leg Syndrome    [DISCONTINUED] cyanocobalamin 1000 MCG/ML injection INJECT 1 ML INTRAMUSCULARLY ONCE EVERY 30 DAYS AS DIRECTED (Patient taking differently: No sig reported)    ertapenem (INVanz) 1 g injection  (Patient not taking: Reported on " 6/13/2024)     No current facility-administered medications on file prior to visit.          HISTORY OF PRESENT ILLNESS     Complains of worsening fatigue. Has not been taking B12 injection. Resumed paroxetine 40 mg daily since last visit. Restarted gabapentin 100 mg qHS (prescribed by oncologist) for peripheral neuropathy.     Patient Care Team:  Brandin Bolton MD as PCP - General (Internal Medicine)  Tapan, George THORPE II, MD as Consulting Physician (Hematology and Oncology)  Jose Inman MD as Consulting Physician (Urology)  Mulugeta Millan MD as Consulting Physician (Gastroenterology)  Jose Christine III, MD as Referring Physician (Thoracic Surgery)  Seth Randhawa MD as Consulting Physician (Ophthalmology)  James Cyr MD as Consulting Physician (Cardiology)  Stephon Sommers MD as Consulting Physician (Sleep Medicine)  Rolanda Solomon MD as Consulting Physician (Nephrology)    REVIEW OF SYSTEMS     Review of Systems       PHYSICAL EXAMINATION     Physical Exam  Alert with normal thought and judgment.   Depression seems better   Cardiovascular: Normal rate, regular rhythm.       REVIEWED DATA     Labs:     Lab Results   Component Value Date     11/17/2023    K 3.7 11/17/2023    CALCIUM 8.6 11/17/2023    AST 19 11/14/2023    ALT 15 11/14/2023    BUN 16 11/17/2023    CREATININE 1.05 11/17/2023    CREATININE 1.15 11/16/2023    CREATININE 1.18 11/15/2023    EGFRRESULT 59.5 (L) 06/23/2023       Lab Results   Component Value Date    HGBA1C 5.50 08/06/2021    HGBA1C 6.20 (H) 01/17/2020       Lab Results   Component Value Date    LDL 47 06/23/2023    LDL 52 05/12/2023    LDL 98 06/02/2022    HDL 62 (H) 06/23/2023    HDL 52 05/12/2023    TRIG 36 06/23/2023    TRIG 49 05/12/2023       Lab Results   Component Value Date    TSH 1.600 05/12/2023    TSH 2.710 01/17/2020    TSH 2.050 06/06/2019    FREET4 1.36 05/12/2023    FREET4 1.52 01/17/2020    FREET4 1.18 06/06/2019       Lab Results  "  Component Value Date    WBC 4.47 05/07/2024    HGB 11.9 (L) 05/07/2024     05/07/2024       No results found for: \"MALBCRERATIO\"      Lab Results   Component Value Date    QWAC15BH 25.4 (L) 05/19/2021      Lab Results   Component Value Date    LDBNONZD12 878 05/07/2024        Imaging:               Medical Tests:               Summary of old records / correspondence / consultant report:             Request outside records:       "

## 2024-06-13 NOTE — TELEPHONE ENCOUNTER
Caller: Jose Hurtado    Relationship: Self       What was the call regarding: NOT FEELING WELL

## 2024-06-16 ENCOUNTER — READMISSION MANAGEMENT (OUTPATIENT)
Dept: CALL CENTER | Facility: HOSPITAL | Age: 74
End: 2024-06-16
Payer: MEDICARE

## 2024-06-17 ENCOUNTER — TRANSITIONAL CARE MANAGEMENT TELEPHONE ENCOUNTER (OUTPATIENT)
Dept: CALL CENTER | Facility: HOSPITAL | Age: 74
End: 2024-06-17
Payer: MEDICARE

## 2024-06-17 NOTE — OUTREACH NOTE
Call Center TCM Note      Flowsheet Row Responses   [facility] patient discharged from? Non-BH   Does the patient have one of the following disease processes/diagnoses(primary or secondary)? Other   TCM attempt successful? No  [VR-Spouse]   Unsuccessful attempts Attempt 1            Tonie Garcia RN    6/17/2024, 16:05 EDT

## 2024-06-17 NOTE — OUTREACH NOTE
Call Center TCM Note      Flowsheet Row Responses   [facility] patient discharged from? Non-BH   Does the patient have one of the following disease processes/diagnoses(primary or secondary)? Other   TCM attempt successful? Yes  [Vr-Spouse Lena]   Call start time 1617   Call end time 1619   Discharge diagnosis Complicated UTI   Meds reviewed with patient/caregiver? Yes   Is the patient having any side effects they believe may be caused by any medication additions or changes? No   Does the patient have all medications ordered at discharge? Yes   Is the patient taking all medications as directed (includes completed medication regime)? Yes   Comments Hospital d/c follow up 6/20/24@1045.   Does the patient have an appointment with their PCP within 7-14 days of discharge? Yes   Has home health visited the patient within 72 hours of discharge? N/A   Psychosocial issues? No   Did the patient receive a copy of their discharge instructions? Yes   What is the patient's perception of their health status since discharge? Improving   Is the patient/caregiver able to teach back signs and symptoms related to disease process for when to call PCP? Yes   Is the patient/caregiver able to teach back signs and symptoms related to disease process for when to call 911? Yes   Is the patient/caregiver able to teach back the hierarchy of who to call/visit for symptoms/problems? PCP, Specialist, Home health nurse, Urgent Care, ED, 911 Yes   TCM call completed? Yes   Call end time 1619   Would this patient benefit from a Referral to Amb Social Work? No   Is the patient interested in additional calls from an ambulatory ? No            Tonie Garcia RN    6/17/2024, 16:19 EDT

## 2024-06-17 NOTE — OUTREACH NOTE
Prep Survey      Flowsheet Row Responses   [facility] patient discharged from? Non-BH   Is LACE score < 7 ? Non- Discharge   Eligibility [facility]   Date of Admission 06/14/24   Date of Discharge 06/16/24   Discharge diagnosis Complicated UTI   Does the patient have one of the following disease processes/diagnoses(primary or secondary)? Other   Does the patient have Home health ordered? No   Is there a DME ordered? No   Prep survey completed? Yes            WILL HAN - Registered Nurse

## 2024-06-19 ENCOUNTER — TREATMENT (OUTPATIENT)
Age: 74
End: 2024-06-19
Payer: MEDICARE

## 2024-06-19 DIAGNOSIS — M25.562 CHRONIC PAIN OF BOTH KNEES: ICD-10-CM

## 2024-06-19 DIAGNOSIS — R26.89 DECREASED MOBILITY: ICD-10-CM

## 2024-06-19 DIAGNOSIS — M54.50 CHRONIC BILATERAL LOW BACK PAIN WITHOUT SCIATICA: ICD-10-CM

## 2024-06-19 DIAGNOSIS — G89.29 CHRONIC PAIN OF BOTH KNEES: ICD-10-CM

## 2024-06-19 DIAGNOSIS — R53.81 PHYSICAL DECONDITIONING: ICD-10-CM

## 2024-06-19 DIAGNOSIS — M25.561 CHRONIC PAIN OF BOTH KNEES: ICD-10-CM

## 2024-06-19 DIAGNOSIS — G89.29 CHRONIC BILATERAL LOW BACK PAIN WITHOUT SCIATICA: ICD-10-CM

## 2024-06-19 DIAGNOSIS — R26.89 BALANCE PROBLEM: Primary | ICD-10-CM

## 2024-06-19 NOTE — PROGRESS NOTES
Physical Therapy Re-Assessment   [facility] [place]   [address] [place] [address]  P: [phone]       F: [phone]        Patient: Jose Hurtado   : 1950  Visit Diagnoses:     ICD-10-CM ICD-9-CM   1. Balance problem  R26.89 781.99   2. Decreased mobility  R26.89 781.99   3. Physical deconditioning  R53.81 799.3   4. Chronic pain of both knees  M25.561 719.46    M25.562 338.29    G89.29    5. Chronic bilateral low back pain without sciatica  M54.50 724.2    G89.29 338.29     Referring practitioner: Brandin Bolton MD  Date of Initial Visit: Type: THERAPY  Noted: 2024  Today's Date: 2024  Patient seen for 10 sessions      Subjective:   Jose Hurtado reports:   Subjective Questionnaire: ABC: 49%  Clinical Progress: improved  Home Program Compliance: Yes  Treatment has included: therapeutic exercise, neuromuscular re-education, manual therapy, and therapeutic activity    Subjective   Patient reports he was hospitalized for 3 days due to a UTI a couple days after last session.  States he feels much better now.  States he feels stiff but his strength has returned from being ill.      Objective      Tenderness   No TTP of knees     Active Range of Motion   Left Hip   Extension: 10 (LAG) degrees      Right Hip   Extension: 15 (LAG) degrees     Left Knee   Extensor lag: 15 degrees      Right Knee   Extensor lag: 10 degrees      Additional Active Range of Motion Details  Lumbar ROM is limited, grossly 50%.     Strength/Myotome Testing      Left Hip   Planes of Motion   Flexion: 3+  Extension: 4  Abduction: 4+  External rotation: 5  Internal rotation: 5     Right Hip   Planes of Motion   Flexion: 4+  Extension: 4  Abduction: 4+  External rotation:5  Internal rotation: 5     Left Knee   Flexion: 5  Extension: 5     Right Knee   Flexion: 5  Extension: 5     Ambulation      Observational Gait   Gait: Asymmetrical   Decreased walking speed  Increased NOEL  Abducted  arms     Quality of Movement During Gait   Trunk  Forward lean.      Functional Assessment      Comments  5XSTS (24 inch height table): 15.88 seconds (used both hand to push up from table and then on thighs to straighten back)     TUG (from 24 inch height table): 16.01 seconds (used left hand to push up from table, no AD)        See Exercise, Manual, and Modality Logs for complete treatment.     Assessment/Plan  Patient returns to PT following a hospitalization.  He has increased stiffness in his spine and LE.  Strength has improved some.  Functional performance test score improved for sit to stand however the TUG test was longer.  His gait is more guarded with increased trunk flexion, increased NOEL and increased arm abduction bilaterally.  He will benefit from continued PT to address his deficits, improve balance and gait.   Progress toward previous goals: Partially Met    Goal Review  Short Term Goals (4 wks):  1.  Patient will have increased lumbar spine ROM to WFL.-ongoing  2.  Patient will have increased bilateral hip extension to 0 degrees.-progressing  3.  Patient will have increased bilateral knee extension to 0 degrees.-progressing  4.  Patient will normalized tone in bilateral iliopsoas.-progressing     Long Term Goals (8 wks):  1.  Patient will be independent in performance of HEP.-progressing  2.  Patient will have improved ABC score of 60% or better.-progressing  3.  Patient will have improved 5XSTS test time of 30  seconds or less.-met  4.  Patient will have improved TUG test time 18 seconds or less.-met      Recommendations: Continue as planned  Timeframe: 2 months  Prognosis to achieve goals: good    PT SIGNATURE: Mervat Ramachandran PT     License Number: [id number]  Electronically signed by Mervat Ramachandran PT, 06/19/24, 9:34 AM EDT          Timed:         Manual Therapy:         mins  54208;     Therapeutic Exercise:    30     mins  57631;     Neuromuscular Wil:        mins  32705;    Therapeutic  Activity:     15     mins  71935;     Gait Training:           mins  48019;     Ultrasound:          mins  88349;    Ionto                                  mins  33968  Self Care                            mins  77881  Canalith Repos         mins  63594  Orthotic MGMT/Train         mins  09115    Un-Timed:  Electrical Stimulation:         mins  01543 ( );  Dry Needling:          mins  94528 self-pay;  Dry Needling:          mins  05337 self-pay  Traction          mins  16619  Low Eval          mins  91244  Mod Eval          mins  19215  High Eval                            mins  00575    Timed Treatment:   45   mins   Total Treatment:     45   mins

## 2024-06-20 ENCOUNTER — OFFICE VISIT (OUTPATIENT)
Dept: INTERNAL MEDICINE | Age: 74
End: 2024-06-20
Payer: MEDICARE

## 2024-06-20 VITALS
HEIGHT: 77 IN | TEMPERATURE: 98.2 F | HEART RATE: 97 BPM | OXYGEN SATURATION: 98 % | SYSTOLIC BLOOD PRESSURE: 115 MMHG | BODY MASS INDEX: 20.31 KG/M2 | DIASTOLIC BLOOD PRESSURE: 72 MMHG | WEIGHT: 172 LBS | RESPIRATION RATE: 16 BRPM

## 2024-06-20 DIAGNOSIS — Z87.440 HISTORY OF CYSTITIS: Primary | ICD-10-CM

## 2024-06-20 LAB
BUN SERPL-MCNC: 22 MG/DL (ref 8–23)
BUN/CREAT SERPL: 17.1 (ref 7–25)
CALCIUM SERPL-MCNC: 8.4 MG/DL (ref 8.6–10.5)
CHLORIDE SERPL-SCNC: 105 MMOL/L (ref 98–107)
CO2 SERPL-SCNC: 25.9 MMOL/L (ref 22–29)
CREAT SERPL-MCNC: 1.29 MG/DL (ref 0.76–1.27)
EGFRCR SERPLBLD CKD-EPI 2021: 57.5 ML/MIN/1.73
GLUCOSE SERPL-MCNC: 102 MG/DL (ref 65–99)
POTASSIUM SERPL-SCNC: 4.5 MMOL/L (ref 3.5–5.2)
SODIUM SERPL-SCNC: 139 MMOL/L (ref 136–145)

## 2024-06-20 PROCEDURE — 1111F DSCHRG MED/CURRENT MED MERGE: CPT

## 2024-06-20 PROCEDURE — 3074F SYST BP LT 130 MM HG: CPT

## 2024-06-20 PROCEDURE — 1126F AMNT PAIN NOTED NONE PRSNT: CPT

## 2024-06-20 PROCEDURE — 99495 TRANSJ CARE MGMT MOD F2F 14D: CPT

## 2024-06-20 PROCEDURE — 3078F DIAST BP <80 MM HG: CPT

## 2024-06-20 NOTE — PROGRESS NOTES
"    I N T E R N A L  M E D I C I N E  Yumi Grider, APRN       ENCOUNTER DATE:  06/20/2024    Jose FITCH Hurtado / 74 y.o. / male        CC:   (Transitional Care Follow Up Visit)  Urinary Tract Infection        Within 48 business hours after discharge our office contacted him via telephone to coordinate his care and needs.      I reviewed and discussed the details of that call along with the discharge summary, hospital problems, inpatient lab results, inpatient diagnostic studies, and consultation reports with the patient.         6/16/2024     9:21 PM   Date of TCM Phone Call   [facility]   Date of Admission 6/14/2024   Date of Discharge 6/16/2024       Risk for Readmission (LACE) No data recorded          VITALS    Visit Vitals  /72   Pulse 97   Temp 98.2 °F (36.8 °C)   Resp 16   Ht 195.6 cm (77.01\")   Wt 78 kg (172 lb)   SpO2 98%   BMI 20.39 kg/m²       BP Readings from Last 3 Encounters:   06/20/24 115/72   06/13/24 90/58   05/23/24 118/62     Wt Readings from Last 3 Encounters:   06/20/24 78 kg (172 lb)   06/13/24 74.8 kg (165 lb)   05/23/24 81.6 kg (180 lb)      Body mass index is 20.39 kg/m².    HPI:     Date of admission/discharge: As noted above in CC  Hospital: [facility]   Principle Dx: Complicated UTI  Secondary Dx: Generalized weakness; neurogenic bladder, esophageal cancer, pancreatic cancer  History prior to hospitalization: Arrived to ER via EMS for poor appetite and nausea x 2 days.    Evaluation/Treatment: CT Abd showed right hydronephrosis and hydroureter.  Urology was consulted.  CT Abd showed small right pleural effusion vs empyema, for which CT Chest ordered, which showed small right pleural effusion with right sided pleural thickening, no evidence of empyema.  Also finding of 2 mm left lower lobe micronodule, for which follow up CT Chest is recommended in 12 months, given his cancer history. Treated UTI with ceftriaxone, and his elevated creatinine with IV " fluids.  At time of discharge, BMP with creatinine 1.19, GFR 63.  Since he improved symptomatically after receiving IV fluids despite urine culture revealing bacteria that was resistant to prescribed antibiotic, decision was made to defer oral antibiotic therapy.  He declined HH referral.  Course: Feels as though he is back to baseline at today's visit.  No fever, chills, chest pain, dyspnea.  Continues to self cath and denies any pain, malodorous or foul appearing urine.  Followed by cardiothoracic,  FAYE Jean, who has ordered CT Chest on May 23, 2024 as 6 month follow up of monitoring of left lobe micro nodule.  Continues to be followed by hematology/ oncology, Dr. Phelan.  Next appointment is August 2, 2024.  Eating and drinking well.      Patient Care Team:  Brandin Bolton MD as PCP - General (Internal Medicine)  Tapan, George THORPE II, MD as Consulting Physician (Hematology and Oncology)  Jose Inman MD as Consulting Physician (Urology)  Mulugeta Millan MD as Consulting Physician (Gastroenterology)  Emmett, Jose MCINTOSH III, MD as Referring Physician (Thoracic Surgery)  Seth Randhawa MD as Consulting Physician (Ophthalmology)  James Cyr MD as Consulting Physician (Cardiology)  Stephon Sommers MD as Consulting Physician (Sleep Medicine)  Rolanda Solomon MD as Consulting Physician (Nephrology)  ____________________________________________________________________    ASSESSMENT & PLAN:    1. History of cystitis      Orders Placed This Encounter   Procedures    Basic metabolic panel       Summary/Discussion:  He is hemodynamically stable and back to mental baseline at today's visit.  We will review updated BMP to ensure kidney stability.  He should follow up with hematology/ cardiothoracic surgery as scheduled.     Return for Next scheduled follow up.    ____________________________________________________________________    REVIEW OF SYSTEMS    Review of Systems   Constitutional:   Negative for chills, fever and unexpected weight change.   Respiratory:  Negative for cough, chest tightness and shortness of breath.    Cardiovascular:  Negative for chest pain, palpitations and leg swelling.   Neurological:  Negative for dizziness, weakness, light-headedness and headaches.   Psychiatric/Behavioral:  The patient is not nervous/anxious.          PHYSICAL EXAMINATION    Physical Exam  Vitals reviewed.   Constitutional:       General: He is not in acute distress.     Appearance: Normal appearance. He is not ill-appearing, toxic-appearing or diaphoretic.   HENT:      Head: Normocephalic and atraumatic.   Cardiovascular:      Rate and Rhythm: Normal rate and regular rhythm.      Heart sounds: Normal heart sounds.   Pulmonary:      Effort: Pulmonary effort is normal.      Breath sounds: Normal breath sounds.   Abdominal:      Palpations: Abdomen is soft.      Tenderness: There is no abdominal tenderness.   Musculoskeletal:      Right lower leg: Edema (Wrapped with ACE bandage) present.      Left lower leg: Edema (Wrapped with ACE bandage) present.   Neurological:      Mental Status: He is alert and oriented to person, place, and time. Mental status is at baseline.   Psychiatric:         Mood and Affect: Mood normal.         Behavior: Behavior normal.         Thought Content: Thought content normal.         Judgment: Judgment normal.           REVIEWED DATA:    Labs:   Lab Results   Component Value Date     11/17/2023    K 3.7 11/17/2023    CALCIUM 8.6 11/17/2023    AST 19 11/14/2023    ALT 15 11/14/2023    BUN 16 11/17/2023    CREATININE 1.05 11/17/2023    CREATININE 1.15 11/16/2023    CREATININE 1.18 11/15/2023    EGFRIFNONA 62 06/04/2021    EGFRIFAFRI 75 06/04/2021       Lab Results   Component Value Date    WBC 4.93 06/16/2024    HGB 9.8 (L) 06/16/2024    HGB 9.6 (L) 06/15/2024    HGB 10.4 (L) 06/14/2024     06/16/2024       Lab Results   Component Value Date    GLUCOSEU Negative  11/15/2023    BLOODU Moderate (2+) (A) 11/15/2023    NITRITEU Negative 11/15/2023    LEUKOCYTESUR Moderate (2+) (A) 11/15/2023       Imaging:   CT Chest Wo Contrast    Result Date: 2024  Narrative: REVIEWING YOUR TEST RESULTS IN [facility] IS NOT A SUBSTITUTE FOR DISCUSSING THOSE RESULTS WITH YOUR HEALTH CARE PROVIDER. PLEASE CONTACT YOUR PROVIDER VIA [facility] TO DISCUSS ANY QUESTIONS OR CONCERNS YOU MAY HAVE REGARDING THESE TEST RESULTS.  RADIOLOGY REPORT FACILITY:  [facility] UNIT/AGE/GENDER: LUKE  IN      AGE:74 Y          SEX:M PATIENT NAME/:  JJ BOURGEOIS W    1950 UNIT NUMBER:  [id number] ACCOUNT NUMBER:  [id number] ACCESSION NUMBER:  [id number] EXAM: CT CHEST WO CONTRAST DATE OF EXAM: 2024 CLINICAL HISTORY: Possible empyema COMPARISON: Study correlated with CT abdomen/pelvis dated 2024. Chest x-ray dated 2018. TECHNIQUE: CT imaging of the thorax without intravenous contrast. CT scans at this facility use dose modulation, iterative reconstruction, and/or weight-based dosing when appropriate to reduce radiation dose to as low as reasonably achievable (ALARA). . FINDINGS: . Chest Wall and Axillary Regions: Grossly unremarkable.   . Lungs and Large Airways: There are scattered areas of atelectasis/scarring in the right lower lung field. There is a 2 mm left lower lobe micronodule on series 3 image 457. No lung masses or areas of airspace consolidation. No pneumothorax. There is debris noted within the dependent aspect of the right mainstem bronchus lumen, either retained secretions or aspirated material. . Pleura: A small right pleural effusion is noted. Right-sided pleural thickening is also noted. . Visualized Lower Neck and Supraclavicular Region: No significant abnormalities.   . Mediastinum: There is an enlarged subcarinal lymph node measuring 1.4 x 2.4 cm (AP x TRV) . Thoracic aortic and coronary artery calcifications are noted. There  are postoperative changes of esophagectomy with gastric pull-through. No evidence of complicating feature. . Visualized Portions of the Upper Abdomen: No significant abnormalities.   . Musculoskeletal: Chronic defects in right ribs are noted, likely postoperative. No acute osseous abnormalities. Fusion hardware in the cervical spine is noted.   . Remainder of Exam: Unremarkable.   . IMPRESSION: 1. No acute findings.   2. A small right pleural effusion with right-sided pleural thickening is noted. No signs of empyema on this noncontrast CT. 3. A 2 mm left lower lobe micronodule is noted. As per Fleischner Society Guidelines for follow-up and management of incidental pulmonary nodules/micronodules less than 6mm, In a low risk patient (minimal or absent history of smoking and/or other known risk factors for lung cancer), no follow-up is needed. In a high risk patient (history of smoking or other risk factors for lung cancer), consider follow-up chest CT at 12 months. 4. An enlarged subcarinal lymph node is noted. No additional lymphadenopathy. 5. Postoperative changes of esophagectomy with gastric pull-through are noted. No evidence of complicating feature. 6. Secondary findings are discussed in the body of the report. Dictated by: Albert Gonzalez M.D. Images and Report reviewed and interpreted by: Albert Gonzalez M.D. <PS><Electronically signed by: Albert Gonzalez M.D.> 2024 0723 D: [id number] T: 2024 0715    CT Abdomen & Pelvis With IV Contrast Without Oral Contrast    Result Date: 2024  Narrative: REVIEWING YOUR TEST RESULTS IN [facility] IS NOT A SUBSTITUTE FOR DISCUSSING THOSE RESULTS WITH YOUR HEALTH CARE PROVIDER. PLEASE CONTACT YOUR PROVIDER [facility]IA [facility] TO DISCUSS ANY QUESTIONS OR CONCERNS YOU MAY HAVE REGARDING THESE TEST RESULTS.  RADIOLOGY REPORT FACILITY:  [facility] UNIT/AGE/GENDER: J.ED  IN      AGE:74 Y          SEX:M PATIENT NAME/:  JJ BOURGEOIS W     1950 UNIT NUMBER:  [id number] ACCOUNT NUMBER:  [id number] ACCESSION NUMBER:  [id number] CT ABDOMEN AND PELVIS W IV CONTRAST DATE: 06/14/2024 EXAM: CT ABDOMEN /PELVIS WITH INTRAVENOUS CONTRAST COMPARISON: None available. INDICATION: Abdominal pain, acute, nonlocalized. PROCEDURE: Contiguous axial images of the abdomen and pelvis were obtained following the uneventful administration of intravenous contrast. Dose reduction techniques were employed per ALARA protocol. FINDINGS: Attenuation artifact related to patient arm positioning at their sides degrades the examination. Small right pleural fluid collection with nonspecific surrounding pleural thickening. Right basilar atelectasis is also present. Postoperative changes of esophagectomy are noted. Unremarkable appearance of gastric pull-through. No evidence of bowel obstruction. Appendix is not visualized and is reportedly surgically absent. Nonobstructing calyceal stones noted in both kidneys measuring up to 4 mm in the lower pole of the left kidney. Mild bilateral hydronephrosis and hydroureter noted with no visualized obstructing etiology. Subcentimeter cyst at lower pole of right kidney. Nonspecific distention of the gallbladder is likely related to fasting state. Liver, spleen, pancreas and adrenal glands are within normal limits. No pneumoperitoneum or free fluid. Borderline wall thickening of urinary bladder and borderline distention of urinary bladder are noted. A single focus of gas in the anterior aspect of the bladder could be related to recent catheterization. Prostate gland is absent. Scattered aortoiliac atherosclerotic vascular calcifications are present. No mesenteric or retroperitoneal lymphadenopathy. No pneumoperitoneum or significant free fluid. Mild multilevel spondylosis of the lumbar spine. IMPRESSION: 1. Mild right hydronephrosis and hydroureter without visualized etiology. 2. Nonobstructive bilateral nephrolithiasis. 3. Trace right  pleural fluid collection with surrounding rim seen in setting of postoperative changes of esophagectomy, probably a chronic finding. 4. A preliminary report of these findings was provided by [employer]. Dictated by: Dino Crowell M.D. Images and Report reviewed and interpreted by: Dino Crowell M.D. <PS><Electronically signed by: Dino Crowell M.D.> 06/14/2024 0649 D: 06/14/2024 0641 T: 06/14/2024 0641      Medical Tests:        Summary of old records / correspondence / consultant report:   DC summary re: issues addressed on HPI    Request outside records:         MEDICATIONS   Current Outpatient Medications   Medication Sig Dispense Refill    acetaminophen (TYLENOL) 325 MG tablet Take 2 tablets by mouth Every 6 (Six) Hours As Needed for Mild Pain, Headache or Fever.      albuterol sulfate  (90 Base) MCG/ACT inhaler Inhale 1 puff Every 4 (Four) Hours As Needed for Wheezing.      atorvastatin (LIPITOR) 40 MG tablet Take 1 tablet by mouth Every Night. (Patient taking differently: Take 1 tablet by mouth Every Night.) 90 tablet 3    Cholecalciferol (Vitamin D3) 50 MCG (2000 UT) capsule Take 1 capsule by mouth Daily.      cyanocobalamin 1000 MCG/ML injection Inject 1 mL under the skin into the appropriate area as directed Every 30 (Thirty) Days. Indications: Inadequate Vitamin B12 4 mL 5    Eliquis 5 MG tablet tablet TAKE ONE TABLET BY MOUTH EVERY 12 HOURS 60 tablet 1    EPINEPHrine, Anaphylaxis, (ADRENALIN) 1 MG/ML injection Inject 0.3 mL into the appropriate muscle as directed by prescriber As Needed for Anaphylaxis. Indications: Life-Threatening Hypersensitivity Reaction      furosemide (LASIX) 20 MG tablet Take 1 tablet by mouth Daily.      gabapentin (Neurontin) 100 MG capsule Take 1 capsule by mouth 3 (Three) Times a Day.      metoclopramide (Reglan) 5 MG tablet Take 1 tablet by mouth 2 (Two) Times a Day. You will need to schedule an office apt for further refills 60 tablet 5    PARoxetine (PAXIL) 40 MG  tablet TAKE ONE TABLET BY MOUTH EVERY MORNING 30 tablet 5    polyethylene glycol (MIRALAX) 17 g packet Take 17 g by mouth Daily. (Patient taking differently: Take 17 g by mouth Daily.) 30 each 0    pramipexole (MIRAPEX) 1.5 MG tablet Take 1 tablet by mouth 2 (Two) Times a Day. Indications: Restless Leg Syndrome 180 tablet 1    ertapenem (INVanz) 1 g injection  (Patient not taking: Reported on 6/13/2024)       No current facility-administered medications for this visit.       Current outpatient and discharge medications have been reconciled for the patient.  Reviewed by: FAYE Jason         @MSK@

## 2024-06-21 ENCOUNTER — TREATMENT (OUTPATIENT)
Age: 74
End: 2024-06-21
Payer: MEDICARE

## 2024-06-21 DIAGNOSIS — M25.561 CHRONIC PAIN OF BOTH KNEES: ICD-10-CM

## 2024-06-21 DIAGNOSIS — M54.50 CHRONIC BILATERAL LOW BACK PAIN WITHOUT SCIATICA: ICD-10-CM

## 2024-06-21 DIAGNOSIS — R26.89 DECREASED MOBILITY: ICD-10-CM

## 2024-06-21 DIAGNOSIS — R53.81 PHYSICAL DECONDITIONING: ICD-10-CM

## 2024-06-21 DIAGNOSIS — R26.89 BALANCE PROBLEM: Primary | ICD-10-CM

## 2024-06-21 DIAGNOSIS — G89.29 CHRONIC BILATERAL LOW BACK PAIN WITHOUT SCIATICA: ICD-10-CM

## 2024-06-21 DIAGNOSIS — G89.29 CHRONIC PAIN OF BOTH KNEES: ICD-10-CM

## 2024-06-21 DIAGNOSIS — M25.562 CHRONIC PAIN OF BOTH KNEES: ICD-10-CM

## 2024-06-21 NOTE — PROGRESS NOTES
Physical Therapy Treatment Note  [facility]   [address] [place] [address]  P: [phone]       F: [phone]      Patient: Jose Hurtado   : 1950  Treatment Diagnosis:     ICD-10-CM ICD-9-CM   1. Balance problem  R26.89 781.99   2. Decreased mobility  R26.89 781.99   3. Physical deconditioning  R53.81 799.3   4. Chronic pain of both knees  M25.561 719.46    M25.562 338.29    G89.29    5. Chronic bilateral low back pain without sciatica  M54.50 724.2    G89.29 338.29     Referring practitioner: Brandin Bolton MD  Date of Initial Visit: Type: THERAPY  Noted: 2024  Today's Date: 2024  Patient seen for 11 sessions           Subjective   Patient reports he is feeling much better and his appetite has returned as well.      Objective     See Exercise, Manual, and Modality Logs for complete treatment.       Assessment/Plan  Patient performed program to tolerance. He tolerated program well with no adverse symptoms.  He continues with increased trunk flexion and LE ROM limitations.  Will continue to progress as tolerated.    Progress per Plan of Care and Progress strengthening /stabilization /functional activity           Timed:         Manual Therapy:         mins  98635;     Therapeutic Exercise:    30     mins  56950;     Neuromuscular Wil:        mins  36360;    Therapeutic Activity:     10     mins  72359;     Gait Training:           mins  64840;     Ultrasound:          mins  02179;    Ionto                                  mins  59041  Self Care                            mins  80731  Canalith Repos         mins  69057  Orthotic MGMT/Train         mins  11994    Un-Timed:  Electrical Stimulation:         mins  05005 ( );  Dry Needling:          mins  58809 self-pay;  Dry Needling:          mins   self-pay  Traction          mins  46322      Timed Treatment:   40   mins   Total Treatment:     40   mins        PT SIGNATURE: Mervat RUIZ  Duarte PT     License Number: [id number]  Electronically signed by Mervat Ramachandran PT, 06/21/24, 9:42 AM EDT

## 2024-06-25 ENCOUNTER — TREATMENT (OUTPATIENT)
Age: 74
End: 2024-06-25
Payer: MEDICARE

## 2024-06-25 DIAGNOSIS — R26.89 DECREASED MOBILITY: ICD-10-CM

## 2024-06-25 DIAGNOSIS — M25.562 CHRONIC PAIN OF BOTH KNEES: ICD-10-CM

## 2024-06-25 DIAGNOSIS — R26.89 BALANCE PROBLEM: Primary | ICD-10-CM

## 2024-06-25 DIAGNOSIS — R53.81 PHYSICAL DECONDITIONING: ICD-10-CM

## 2024-06-25 DIAGNOSIS — M25.561 CHRONIC PAIN OF BOTH KNEES: ICD-10-CM

## 2024-06-25 DIAGNOSIS — G89.29 CHRONIC BILATERAL LOW BACK PAIN WITHOUT SCIATICA: ICD-10-CM

## 2024-06-25 DIAGNOSIS — G89.29 CHRONIC PAIN OF BOTH KNEES: ICD-10-CM

## 2024-06-25 DIAGNOSIS — M54.50 CHRONIC BILATERAL LOW BACK PAIN WITHOUT SCIATICA: ICD-10-CM

## 2024-06-25 NOTE — PROGRESS NOTES
Physical Therapy Treatment Note  [facility] [place]   [address] [place] [address]  P: [phone]       F: [phone]      Patient: Jose Hurtado   : 1950  Treatment Diagnosis:     ICD-10-CM ICD-9-CM   1. Balance problem  R26.89 781.99   2. Decreased mobility  R26.89 781.99   3. Physical deconditioning  R53.81 799.3   4. Chronic pain of both knees  M25.561 719.46    M25.562 338.29    G89.29    5. Chronic bilateral low back pain without sciatica  M54.50 724.2    G89.29 338.29     Referring practitioner: Brandin Bolton MD  Date of Initial Visit: Type: THERAPY  Noted: 2024  Today's Date: 2024  Patient seen for 12 sessions           Subjective   Patient reports he feels like his legs are getting stronger.  Overall today he feels tired.    Objective     See Exercise, Manual, and Modality Logs for complete treatment.       Assessment/Plan  Patient performed program to tolerance.  Benefits from cueing for technique and to prevent compensatory patterns.  He had improved stride with hip stretching on stairs this session.  Will continue to progress as tolerated.  Progress per Plan of Care           Timed:         Manual Therapy:         mins  84605;     Therapeutic Exercise:    30     mins  07582;     Neuromuscular Wil:        mins  50523;    Therapeutic Activity:     10     mins  89574;     Gait Training:           mins  17282;     Ultrasound:          mins  72437;    Ionto                                  mins  81133  Self Care                            mins  62660  Canalith Repos         mins  31748  Orthotic MGMT/Train         mins  86799    Un-Timed:  Electrical Stimulation:         mins  77615 ( );  Dry Needling:          mins  96541 self-pay;  Dry Needling:          mins  65640 self-pay  Traction          mins  00369      Timed Treatment:   40   mins   Total Treatment:     40   mins        PT SIGNATURE: Mervat Ramachandran PT     License Number:  [id number]  Electronically signed by Mervat Ramachandran PT, 06/25/24, 9:37 AM EDT

## 2024-06-28 ENCOUNTER — TREATMENT (OUTPATIENT)
Age: 74
End: 2024-06-28
Payer: MEDICARE

## 2024-06-28 DIAGNOSIS — G89.29 CHRONIC BILATERAL LOW BACK PAIN WITHOUT SCIATICA: ICD-10-CM

## 2024-06-28 DIAGNOSIS — R26.89 DECREASED MOBILITY: ICD-10-CM

## 2024-06-28 DIAGNOSIS — G89.29 CHRONIC PAIN OF BOTH KNEES: ICD-10-CM

## 2024-06-28 DIAGNOSIS — M25.562 CHRONIC PAIN OF BOTH KNEES: ICD-10-CM

## 2024-06-28 DIAGNOSIS — R26.89 BALANCE PROBLEM: Primary | ICD-10-CM

## 2024-06-28 DIAGNOSIS — M25.561 CHRONIC PAIN OF BOTH KNEES: ICD-10-CM

## 2024-06-28 DIAGNOSIS — R53.81 PHYSICAL DECONDITIONING: ICD-10-CM

## 2024-06-28 DIAGNOSIS — M54.50 CHRONIC BILATERAL LOW BACK PAIN WITHOUT SCIATICA: ICD-10-CM

## 2024-06-30 NOTE — PROGRESS NOTES
Physical Therapy Daily Treatment Note    [facility]  [address]  [address]  [place] [address]     Patient: Jose Hurtado   : 1950  Referring practitioner: Brandin Bolton MD  Date of Initial Visit: Type: THERAPY  Noted: 2024  Today's Date: 24  Patient seen for 13 sessions         Jose Hurtado reports: no new complaints        Subjective     Objective   -ambulates in/out of clinic with walking stick and noted altered trunk positioning from midline(slightly forward flexed) and without noted TKE of B LE;s(ambulates with knees flexed).    See Exercise, Manual, and Modality Logs for complete treatment.   -posture with ambulation in regards to trunk and LE's  -strategies for stretching  -towel roll use for lumbar and thoracic spine  -short term use of heat to hamstring    Assessment/Plan  Compliant/Cooperative with rehab efforts this session.  Subjectively reports no increased symptoms or discomfort with therapeutic exercise today.  Able to perform  exercise/functional activity without increased discomfort.  Benefits from verbal/tactile cues to ensure proper exercise technique and positioning.  Benefits from education regarding condition and awareness of posture(trunk and lower extremities) with ambulation.        Progress per Plan of Care and Progress strengthening /stabilization /functional activity           Timed:  Manual Therapy:         mins  74856;  Therapeutic Exercise:      20   mins  50732;     Neuromuscular Wil:        mins  40643;    Therapeutic Activity:          mins  59026;     Gait Training:           mins  34846;     Ultrasound:          mins  09313;    Self Care                    _8__      mins 60769    Untimed:  Electrical Stimulation:         mins  23482 ( );  Mechanical Traction:         mins  29588;     Timed Treatment:  28    mins   Total Treatment:    28    mins  Rey Sterling PTA  Physical Therapist  Assistant  [id number]

## 2024-07-05 ENCOUNTER — OFFICE VISIT (OUTPATIENT)
Dept: CARDIOLOGY | Facility: CLINIC | Age: 74
End: 2024-07-05
Payer: MEDICARE

## 2024-07-05 VITALS
SYSTOLIC BLOOD PRESSURE: 124 MMHG | BODY MASS INDEX: 20.78 KG/M2 | HEIGHT: 77 IN | DIASTOLIC BLOOD PRESSURE: 62 MMHG | HEART RATE: 77 BPM | WEIGHT: 176 LBS

## 2024-07-05 DIAGNOSIS — I48.0 PAF (PAROXYSMAL ATRIAL FIBRILLATION): Primary | ICD-10-CM

## 2024-07-05 DIAGNOSIS — E78.2 MIXED HYPERLIPIDEMIA: ICD-10-CM

## 2024-07-05 PROCEDURE — 99214 OFFICE O/P EST MOD 30 MIN: CPT | Performed by: NURSE PRACTITIONER

## 2024-07-05 PROCEDURE — 1159F MED LIST DOCD IN RCRD: CPT | Performed by: NURSE PRACTITIONER

## 2024-07-05 PROCEDURE — 93000 ELECTROCARDIOGRAM COMPLETE: CPT | Performed by: NURSE PRACTITIONER

## 2024-07-05 PROCEDURE — 1160F RVW MEDS BY RX/DR IN RCRD: CPT | Performed by: NURSE PRACTITIONER

## 2024-07-05 PROCEDURE — 3074F SYST BP LT 130 MM HG: CPT | Performed by: NURSE PRACTITIONER

## 2024-07-05 PROCEDURE — 3078F DIAST BP <80 MM HG: CPT | Performed by: NURSE PRACTITIONER

## 2024-07-05 RX ORDER — ATORVASTATIN CALCIUM 40 MG/1
40 TABLET, FILM COATED ORAL NIGHTLY
Qty: 90 TABLET | Refills: 2 | Status: SHIPPED | OUTPATIENT
Start: 2024-07-05

## 2024-07-05 NOTE — PROGRESS NOTES
Date of Office Visit: 24  Encounter Provider: FAYE Darling  Place of Service: [facility] CARDIOLOGY  Patient Name: Jose Hurtado  :1950    Chief Complaint   Patient presents with    Primary hypertension    PAF (paroxysmal atrial fibrillation)    Follow-up   :     HPI: Jose Hurtado is a 74 y.o. male  with  hypertension, hyperlipidemia, paroxysmal atrial fibrillation, chronic kidney disease, lymphedema chronic PVCs, recurrent DVTs and COPD.  He also has a history of esophageal cancer with prior gastrectomy with gastric pull through in  and chemo/radiation, now in remission.  He has had mild renal insufficiency in the past.      He was previously followed by Dr. James Cyr.  He is now followed by Dr. Darian Maldonado. I will visit with him in follow-up today have reviewed his medical record  He has remote history of left lower extremity DVT and has been chronically anticoagulated.        He was in the hospital in May 2022 with chronic cough, nausea and retching.  He had chest tightness and elevated troponin.  He had acute kidney injury and had left heart catheterization which showed normal coronary arteries.  Echocardiogram showed preserved left ventricular systolic function and dilated left atrium.  He had chronic left pleural effusion.     In 2020 to his PCP increase Lasix from 20mg to 80 mg and his breathing improved however edema stayed about the same.  proBNP outpatient with as high as 3222 improved to 1808 with this change.  However his creatinine went from 1.51-1.72.  I have advised that we decrease his Lasix from 80 mg to 40 mg until he was seen outpatient by nephrology.  Stat referral to nephrology was placed as well as a referral to the lymphedema clinic.  He did not continue to decline and had increased shortness of breath.  He tested positive for RSV and had elevated troponin.  He had A. fib with RVR and was treated with breathing treatments  "and steroids.  In January 2023 he was on furosemide 40 mg twice daily per nephrology.       He presents today for reassessment.  He reportedly is taking furosemide 20 mg daily.  He has no bleeding concerns with Eliquis 5 mg twice daily.  He has balance issues and ambulates with a cane.  No chest pain or dizziness or palpitation complaint.  He personally wraps his legs routinely for history of lymphedema.  He has a spinal stimulator.          Allergies   Allergen Reactions    Sulfamethoxazole-Trimethoprim Nausea Only     PT. REPORTS \"I DONT KNOW OF ANYTHING IM ALLERGIC TO\" This nurse did not remove it as it had been placed 09-           Family and social history reviewed.     ROS  All other systems were reviewed and are negative          Objective:     Vitals:    07/05/24 0918   BP: 124/62   BP Location: Left arm   Patient Position: Sitting   Pulse: 77   Weight: 79.8 kg (176 lb)   Height: 195.6 cm (77\")     Body mass index is 20.87 kg/m².    PHYSICAL EXAM:  Pulmonary:      Effort: Pulmonary effort is normal.   Cardiovascular:      Normal rate. Regular rhythm.      Comments: Ace wrap bilateral/lymphedema          ECG 12 Lead    Date/Time: 7/5/2024 9:44 AM  Performed by: Silva Wells APRN    Authorized by: Silva Wells APRN  Comparison: compared with previous ECG   Similar to previous ECG  Rhythm: sinus rhythm  Rate: normal  Comments: Artifact from spinal stimulator present            Current Outpatient Medications   Medication Sig Dispense Refill    acetaminophen (TYLENOL) 325 MG tablet Take 2 tablets by mouth Every 6 (Six) Hours As Needed for Mild Pain, Headache or Fever.      atorvastatin (LIPITOR) 40 MG tablet Take 1 tablet by mouth Every Night. 90 tablet 2    Cholecalciferol (Vitamin D3) 50 MCG (2000 UT) capsule Take 1 capsule by mouth Daily.      cyanocobalamin 1000 MCG/ML injection Inject 1 mL under the skin into the appropriate area as directed Every 30 (Thirty) Days. Indications: Inadequate Vitamin " B12 4 mL 5    Eliquis 5 MG tablet tablet TAKE ONE TABLET BY MOUTH EVERY 12 HOURS 60 tablet 1    EPINEPHrine, Anaphylaxis, (ADRENALIN) 1 MG/ML injection Inject 0.3 mL into the appropriate muscle as directed by prescriber As Needed for Anaphylaxis. Indications: Life-Threatening Hypersensitivity Reaction      furosemide (LASIX) 20 MG tablet Take 1 tablet by mouth Daily.      gabapentin (Neurontin) 100 MG capsule Take 1 capsule by mouth 3 (Three) Times a Day.      metoclopramide (Reglan) 5 MG tablet Take 1 tablet by mouth 2 (Two) Times a Day. You will need to schedule an office apt for further refills 60 tablet 5    PARoxetine (PAXIL) 40 MG tablet TAKE ONE TABLET BY MOUTH EVERY MORNING 30 tablet 5    polyethylene glycol (MIRALAX) 17 g packet Take 17 g by mouth Daily. (Patient taking differently: Take 17 g by mouth Daily.) 30 each 0    pramipexole (MIRAPEX) 1.5 MG tablet Take 1 tablet by mouth 2 (Two) Times a Day. Indications: Restless Leg Syndrome 180 tablet 1     No current facility-administered medications for this visit.     Assessment:       Diagnosis Plan   1. PAF (paroxysmal atrial fibrillation)        2. Mixed hyperlipidemia             Orders Placed This Encounter   Procedures    ECG 12 Lead     This order was created via procedure documentation     Order Specific Question:   Release to patient     Answer:   Routine Release [[id number]]         Plan:     1.  74-year-old gentleman with Paroxysmal atrial fibrillation.  Anticoagulated with Eliquis 5 mg twice daily without bleeding issues continue the same   2.  History of left lower extremity DVT on long-term anticoagulation with Eliquis without bleeding issues  3.  Esophageal cancer status postgastrectomy and gastric pull-through in 2015 status post chemotherapy and radiation now in remission  4.  Hypertension blood pressure appears adequately controlled  5.  Hyperlipidemia continue atorvastatin 10 mg  6.  COPD  7.  History of ventricular ectopy  8.   Lymphedema-now with bilateral Ace wraps and stable  9.  Normal coronary arteries on cardiac catheterization May 2022    10.  cardiorenal syndrome.  He continues to follow with nephrology and was reportedly maintained on furosemide 20 mg daily  11.  Presence of spinal stimulator    Follow-up in 1 year.  Call with questions or concerns.          It has been a pleasure to participate in this patient's care.      Thank you,  FAYE Darling      **I used Dragon to dictate this note:**

## 2024-07-08 RX ORDER — ATORVASTATIN CALCIUM 40 MG/1
40 TABLET, FILM COATED ORAL NIGHTLY
Qty: 90 TABLET | Refills: 2 | OUTPATIENT
Start: 2024-07-08

## 2024-07-09 ENCOUNTER — TREATMENT (OUTPATIENT)
Age: 74
End: 2024-07-09
Payer: MEDICARE

## 2024-07-09 DIAGNOSIS — G89.29 CHRONIC BILATERAL LOW BACK PAIN WITHOUT SCIATICA: ICD-10-CM

## 2024-07-09 DIAGNOSIS — R26.89 BALANCE PROBLEM: Primary | ICD-10-CM

## 2024-07-09 DIAGNOSIS — G89.29 CHRONIC PAIN OF BOTH KNEES: ICD-10-CM

## 2024-07-09 DIAGNOSIS — R26.89 DECREASED MOBILITY: ICD-10-CM

## 2024-07-09 DIAGNOSIS — M54.50 CHRONIC BILATERAL LOW BACK PAIN WITHOUT SCIATICA: ICD-10-CM

## 2024-07-09 DIAGNOSIS — R53.81 PHYSICAL DECONDITIONING: ICD-10-CM

## 2024-07-09 DIAGNOSIS — M25.561 CHRONIC PAIN OF BOTH KNEES: ICD-10-CM

## 2024-07-09 DIAGNOSIS — M25.562 CHRONIC PAIN OF BOTH KNEES: ICD-10-CM

## 2024-07-09 NOTE — PROGRESS NOTES
Physical Therapy Treatment Note  [facility] [place]   [address] [place] [address]  P: [phone]       F: [phone]      Patient: Jose Hurtado   : 1950  Treatment Diagnosis:     ICD-10-CM ICD-9-CM   1. Balance problem  R26.89 781.99   2. Decreased mobility  R26.89 781.99   3. Physical deconditioning  R53.81 799.3   4. Chronic pain of both knees  M25.561 719.46    M25.562 338.29    G89.29    5. Chronic bilateral low back pain without sciatica  M54.50 724.2    G89.29 338.29     Referring practitioner: Brandin Bolton MD  Date of Initial Visit: Type: THERAPY  Noted: 2024  Today's Date: 2024  Patient seen for 14 sessions           Subjective   Patient reports his endurance still feels low.  States he has had a couple episodes with his vagus nerve-N/V.  Reports he ran out of his medications and has been off them since Friday.  States he will be picking up his medication today.    Objective     See Exercise, Manual, and Modality Logs for complete treatment.       Assessment/Plan  Patient performed program to tolerance.  He demonstrates improved standing balance.  Benefits from cueing for technique and to prevent compensatory patterns.  Will continue to progress as tolerated.  Progress per Plan of Care and Progress strengthening /stabilization /functional activity           Timed:         Manual Therapy:         mins  96456;     Therapeutic Exercise:    30     mins  45865;     Neuromuscular Wil:    10    mins  41267;    Therapeutic Activity:          mins  90681;     Gait Training:           mins  17140;     Ultrasound:          mins  84278;    Ionto                                  mins  37696  Self Care                            mins  36912  Canalith Repos         mins  94376  Orthotic MGMT/Train         mins  03711    Un-Timed:  Electrical Stimulation:         mins  31765 ( );  Dry Needling:          mins  04859 self-pay;  Dry Needling:           mins  15175 self-pay  Traction          mins  25185      Timed Treatment:   40   mins   Total Treatment:     40   mins        PT SIGNATURE: Mervat Ramachandran PT     License Number: [id number]  Electronically signed by Mervat Ramachandran PT, 07/09/24, 8:07 AM EDT

## 2024-07-11 DIAGNOSIS — G62.9 PERIPHERAL POLYNEUROPATHY: Primary | Chronic | ICD-10-CM

## 2024-07-11 NOTE — TELEPHONE ENCOUNTER
Caller: [facility] [id number] [place], [place] - [address]  AT [facility] [phone] [facility] [phone] FX    Relationship: Pharmacy    Best call back number: [phone]     Requested Prescriptions:   Requested Prescriptions     Pending Prescriptions Disp Refills    gabapentin (Neurontin) 100 MG capsule       Sig: Take 1 capsule by mouth 3 (Three) Times a Day.        Pharmacy where request should be sent: [facility] [id number] [place], [place] [address]  AT [facility] [phone] [facility] [phone] FX     Last office visit with prescribing clinician: 6/13/2024   Last telemedicine visit with prescribing clinician: Visit date not found   Next office visit with prescribing clinician: 11/6/2024     Additional details provided by patient: PATIENT IS OUT OF MEDICATION     Does the patient have less than a 3 day supply:  [x] Yes  [] No    Would you like a call back once the refill request has been completed: [] Yes [x] No    If the office needs to give you a call back, can they leave a voicemail: [] Yes [x] No    Cory Camargo Rep   07/11/24 15:16 EDT

## 2024-07-12 ENCOUNTER — TREATMENT (OUTPATIENT)
Age: 74
End: 2024-07-12
Payer: MEDICARE

## 2024-07-12 DIAGNOSIS — R26.89 DECREASED MOBILITY: ICD-10-CM

## 2024-07-12 DIAGNOSIS — M25.562 CHRONIC PAIN OF BOTH KNEES: ICD-10-CM

## 2024-07-12 DIAGNOSIS — M54.50 CHRONIC BILATERAL LOW BACK PAIN WITHOUT SCIATICA: ICD-10-CM

## 2024-07-12 DIAGNOSIS — G89.29 CHRONIC BILATERAL LOW BACK PAIN WITHOUT SCIATICA: ICD-10-CM

## 2024-07-12 DIAGNOSIS — R53.81 PHYSICAL DECONDITIONING: ICD-10-CM

## 2024-07-12 DIAGNOSIS — R26.89 BALANCE PROBLEM: Primary | ICD-10-CM

## 2024-07-12 DIAGNOSIS — G89.29 CHRONIC PAIN OF BOTH KNEES: ICD-10-CM

## 2024-07-12 DIAGNOSIS — M25.561 CHRONIC PAIN OF BOTH KNEES: ICD-10-CM

## 2024-07-12 RX ORDER — GABAPENTIN 100 MG/1
100 CAPSULE ORAL 3 TIMES DAILY
Qty: 90 CAPSULE | Refills: 2 | Status: SHIPPED | OUTPATIENT
Start: 2024-07-12

## 2024-07-12 NOTE — PROGRESS NOTES
Physical Therapy Treatment Note  [facility] Physical Therapy [place]   [address] [place] [address]  P: [phone]       F: [phone]      Patient: Jose Hurtado   : 1950  Treatment Diagnosis:     ICD-10-CM ICD-9-CM   1. Balance problem  R26.89 781.99   2. Decreased mobility  R26.89 781.99   3. Physical deconditioning  R53.81 799.3   4. Chronic pain of both knees  M25.561 719.46    M25.562 338.29    G89.29    5. Chronic bilateral low back pain without sciatica  M54.50 724.2    G89.29 338.29     Referring practitioner: Brandin Bolton MD  Date of Initial Visit: Type: THERAPY  Noted: 2024  Today's Date: 2024  Patient seen for 15 sessions           Subjective   Patient reports he is feeling better, has started back on his medications.     Objective     See Exercise, Manual, and Modality Logs for complete treatment.       Assessment/Plan  Patient performed program with progressed parameters and exercises.  He has improved exercise tolerance and endurance.  Continues with difficulty keeping trunk upright in sitting and during gait.  Will continue to progress as tolerated.  Progress per Plan of Care and Progress strengthening /stabilization /functional activity           Timed:         Manual Therapy:         mins  94788;     Therapeutic Exercise:    40     mins  26260;     Neuromuscular Wil:        mins  20810;    Therapeutic Activity:          mins  81883;     Gait Training:           mins  62770;     Ultrasound:          mins  31999;    Ionto                                  mins  53899  Self Care                            mins  56596  Canalith Repos         mins  59017  Orthotic MGMT/Train         mins  11382    Un-Timed:  Electrical Stimulation:         mins  81768 ( );  Dry Needling:          mins  68469 self-pay;  Dry Needling:          mins   self-pay  Traction          mins  60766      Timed Treatment:   40   mins   Total Treatment:     40    mins        PT SIGNATURE: Mervat Ramachandran PT     License Number: [id number]  Electronically signed by Mervat Ramachandran PT, 07/12/24, 10:28 AM EDT

## 2024-07-16 ENCOUNTER — TELEPHONE (OUTPATIENT)
Age: 74
End: 2024-07-16
Payer: MEDICARE

## 2024-07-16 NOTE — TELEPHONE ENCOUNTER
Caller: Jose Hurtado    Relationship: Self         What was the call regarding: NOT FEELING WELL

## 2024-07-19 ENCOUNTER — TREATMENT (OUTPATIENT)
Age: 74
End: 2024-07-19
Payer: MEDICARE

## 2024-07-19 DIAGNOSIS — R53.81 PHYSICAL DECONDITIONING: ICD-10-CM

## 2024-07-19 DIAGNOSIS — G89.29 CHRONIC PAIN OF BOTH KNEES: ICD-10-CM

## 2024-07-19 DIAGNOSIS — G89.29 CHRONIC BILATERAL LOW BACK PAIN WITHOUT SCIATICA: ICD-10-CM

## 2024-07-19 DIAGNOSIS — M25.562 CHRONIC PAIN OF BOTH KNEES: ICD-10-CM

## 2024-07-19 DIAGNOSIS — R26.89 DECREASED MOBILITY: ICD-10-CM

## 2024-07-19 DIAGNOSIS — M25.561 CHRONIC PAIN OF BOTH KNEES: ICD-10-CM

## 2024-07-19 DIAGNOSIS — M54.50 CHRONIC BILATERAL LOW BACK PAIN WITHOUT SCIATICA: ICD-10-CM

## 2024-07-19 DIAGNOSIS — R26.89 BALANCE PROBLEM: Primary | ICD-10-CM

## 2024-07-19 PROCEDURE — 97530 THERAPEUTIC ACTIVITIES: CPT | Performed by: PHYSICAL THERAPIST

## 2024-07-19 PROCEDURE — 97110 THERAPEUTIC EXERCISES: CPT | Performed by: PHYSICAL THERAPIST

## 2024-07-23 ENCOUNTER — TREATMENT (OUTPATIENT)
Age: 74
End: 2024-07-23
Payer: MEDICARE

## 2024-07-23 DIAGNOSIS — G89.29 CHRONIC BILATERAL LOW BACK PAIN WITHOUT SCIATICA: ICD-10-CM

## 2024-07-23 DIAGNOSIS — G89.29 CHRONIC PAIN OF BOTH KNEES: ICD-10-CM

## 2024-07-23 DIAGNOSIS — M54.50 CHRONIC BILATERAL LOW BACK PAIN WITHOUT SCIATICA: ICD-10-CM

## 2024-07-23 DIAGNOSIS — R53.81 PHYSICAL DECONDITIONING: ICD-10-CM

## 2024-07-23 DIAGNOSIS — R26.89 BALANCE PROBLEM: Primary | ICD-10-CM

## 2024-07-23 DIAGNOSIS — M25.562 CHRONIC PAIN OF BOTH KNEES: ICD-10-CM

## 2024-07-23 DIAGNOSIS — R26.89 DECREASED MOBILITY: ICD-10-CM

## 2024-07-23 DIAGNOSIS — M25.561 CHRONIC PAIN OF BOTH KNEES: ICD-10-CM

## 2024-07-23 NOTE — PROGRESS NOTES
Physical Therapy Treatment Note  [facility] Physical Therapy [address]   [address] [place] [address]  P: [phone]       F: [phone]      Patient: Jose Hurtado   : 1950  Treatment Diagnosis:     ICD-10-CM ICD-9-CM   1. Balance problem  R26.89 781.99   2. Decreased mobility  R26.89 781.99   3. Physical deconditioning  R53.81 799.3   4. Chronic pain of both knees  M25.561 719.46    M25.562 338.29    G89.29    5. Chronic bilateral low back pain without sciatica  M54.50 724.2    G89.29 338.29     Referring practitioner: Brandin Bolton MD  Date of Initial Visit: Type: THERAPY  Noted: 2024  Today's Date: 2024  Patient seen for 16 sessions           See media for scanned document of treatment note          Timed:         Manual Therapy:         mins  53246;     Therapeutic Exercise:    30     mins  35707;     Neuromuscular Wil:        mins  52185;    Therapeutic Activity:     8     mins  45298;     Gait Training:           mins  78749;     Ultrasound:          mins  46219;    Ionto                                  mins  18201  Self Care                            mins  26738  Canalith Repos         mins  28439  Orthotic MGMT/Train         mins  28688    Un-Timed:  Electrical Stimulation:         mins  94621 ( );  Dry Needling:          mins  39998 self-pay;  Dry Needling:          mins  32128 self-pay  Traction          mins  22509      Timed Treatment:   38   mins   Total Treatment:     38   mins        PT SIGNATURE: Mervat Ramachandran PT     License Number: [id number]  Electronically signed by Mervat Ramachandran PT, 24, 4:55 PM EDT

## 2024-07-23 NOTE — PROGRESS NOTES
Physical Therapy Re-Assessment / Re-Certification  [facility] Physical Therapy [place]   [address] [place] [address]  P: [phone]       F: [phone]        Patient: Jose Hurtado   : 1950  Visit Diagnoses:     ICD-10-CM ICD-9-CM   1. Balance problem  R26.89 781.99   2. Decreased mobility  R26.89 781.99   3. Physical deconditioning  R53.81 799.3   4. Chronic pain of both knees  M25.561 719.46    M25.562 338.29    G89.29    5. Chronic bilateral low back pain without sciatica  M54.50 724.2    G89.29 338.29     Referring practitioner: Brandin Bolton MD  Date of Initial Visit: Type: THERAPY  Noted: 2024  Today's Date: 2024  Patient seen for 16 sessions      Subjective:   Jose Hurtado reports:   Subjective Questionnaire: ABC: 53%  Clinical Progress: improved  Home Program Compliance: Yes  Treatment has included: therapeutic exercise, neuromuscular re-education, manual therapy, and therapeutic activity    Subjective   Patient reports his legs are feeling better.  Not as tired today.  States his low back pain has been less as well.    Objective     Tenderness   No TTP of knees     Active Range of Motion   Left Hip   Extension: 10 (LAG) degrees      Right Hip   Extension: 10 (LAG) degrees     Left Knee   Extensor lag: 15 degrees      Right Knee   Extensor la degrees      Additional Active Range of Motion Details  Lumbar ROM is limited, grossly 50%.     Strength/Myotome Testing      Left Hip   Planes of Motion   Flexion: 4+  Extension: 4  Abduction: 4+  External rotation: 5  Internal rotation: 5     Right Hip   Planes of Motion   Flexion: 4+  Extension: 4  Abduction: 4+  External rotation:5  Internal rotation: 5     Left Knee   Flexion: 5  Extension: 5     Right Knee   Flexion: 5  Extension: 5     Ambulation      Observational Gait   Gait: Asymmetrical   Decreased walking speed  Increased NOEL  Abducted arms     Quality of Movement During Gait   Trunk  Forward lean.       Functional Assessment      Comments  5XSTS (24 inch height table): 10.69 seconds (used both hand to push up from table, not quite a full upright stand)     TUG (from 24 inch height table): 12.37 seconds (used left hand to push up from table, no AD)        See Exercise, Manual, and Modality Logs for complete treatment.     Assessment/Plan  Patient presents with improving ROM in bilateral hips and knees.  Still has extension limitations bilaterally and altered posture with gait.  He responded positively to manual therapy with improved muscle tone and improved hip and knee ROM.  He will benefit from continued PT.   Progress toward previous goals: Partially Met    Goal Review  Short Term Goals (4 wks):  1.  Patient will have increased lumbar spine ROM to WFL.-ongoing  2.  Patient will have increased bilateral hip extension to 0 degrees.-progressing  3.  Patient will have increased bilateral knee extension to 0 degrees.-progressing  4.  Patient will normalized tone in bilateral iliopsoas.-progressing     Long Term Goals (8 wks):  1.  Patient will be independent in performance of HEP.-progressing  2.  Patient will have improved ABC score of 60% or better.-progressing  3.  Patient will have improved 5XSTS test time of 30  seconds or less.-met  4.  Patient will have improved TUG test time 18 seconds or less.-met      Recommendations: Continue as planned  Timeframe: 2 months  Prognosis to achieve goals: good    PT SIGNATURE: Mervat Ramachandran PT     License Number: [id number]  Electronically signed by Mervat Ramachandran PT, 07/23/24, 8:37 AM EDT    Certification Period: 7/23/2024 thru 10/20/2024  I certify that the therapy services are furnished while this patient is under my care.  The services outlined above are required by this patient, and will be reviewed every 90 days.    Signature: __________________________________    Brandin Bolton MD   NPI: [id number]    Please sign and return via fax to [phone]. Thank you, [facility].      Timed:         Manual Therapy:    10     mins  38253;     Therapeutic Exercise:    20     mins  84706;     Neuromuscular Wil:        mins  47597;    Therapeutic Activity:    10      mins  16017;     Gait Training:           mins  14493;     Ultrasound:          mins  01634;    Ionto                                  mins  27298  Self Care                            mins  89624  Canalith Repos         mins  90561  Orthotic MGMT/Train         mins  34194    Un-Timed:  Electrical Stimulation:         mins  82426 ( );  Dry Needling:          mins  69097 self-pay;  Dry Needling:          mins  31498 self-pay  Traction          mins  38149  Low Eval          mins  75052  Mod Eval          mins  69311  High Eval                            mins  19248    Timed Treatment:   40   mins   Total Treatment:     40   mins

## 2024-07-26 ENCOUNTER — TREATMENT (OUTPATIENT)
Age: 74
End: 2024-07-26
Payer: MEDICARE

## 2024-07-26 DIAGNOSIS — R26.89 BALANCE PROBLEM: Primary | ICD-10-CM

## 2024-07-26 DIAGNOSIS — M54.50 CHRONIC BILATERAL LOW BACK PAIN WITHOUT SCIATICA: ICD-10-CM

## 2024-07-26 DIAGNOSIS — G89.29 CHRONIC PAIN OF BOTH KNEES: ICD-10-CM

## 2024-07-26 DIAGNOSIS — M25.561 CHRONIC PAIN OF BOTH KNEES: ICD-10-CM

## 2024-07-26 DIAGNOSIS — R26.89 DECREASED MOBILITY: ICD-10-CM

## 2024-07-26 DIAGNOSIS — G89.29 CHRONIC BILATERAL LOW BACK PAIN WITHOUT SCIATICA: ICD-10-CM

## 2024-07-26 DIAGNOSIS — M25.562 CHRONIC PAIN OF BOTH KNEES: ICD-10-CM

## 2024-07-26 DIAGNOSIS — R53.81 PHYSICAL DECONDITIONING: ICD-10-CM

## 2024-07-26 NOTE — PROGRESS NOTES
Impression: Dry eye syndrome of bilateral lacrimal glands: H04.123. Plan: Dry eye syndrome requires lubrication of the eye with artificial tears and nighttime ointments or gels. In addition, topical and oral anti-inflammatory medications are usually necessary to treat the associated ocular irritation. Recommend 3-4 drops daily of OTC drops such as Systane or Refresh. Refresh sample given in office. Contact office if dry eye does not improve, worsens or blurs vision. Physical Therapy Treatment Note  [facility] [place]   [address] [place] [address]  P: [phone]       F: [phone]      Patient: Jose Hurtado   : 1950  Treatment Diagnosis:     ICD-10-CM ICD-9-CM   1. Balance problem  R26.89 781.99   2. Decreased mobility  R26.89 781.99   3. Physical deconditioning  R53.81 799.3   4. Chronic pain of both knees  M25.561 719.46    M25.562 338.29    G89.29    5. Chronic bilateral low back pain without sciatica  M54.50 724.2    G89.29 338.29     Referring practitioner: Brandin Bolton MD  Date of Initial Visit: Type: THERAPY  Noted: 2024  Today's Date: 2024  Patient seen for 18 sessions           Subjective   Patient reports he is feeling better.  Feels like the stretching exercises are helping-still feels a strong stretch    Objective     See Exercise, Manual, and Modality Logs for complete treatment.       Assessment/Plan  Patient performed program with progressed parameters.  He tolerated progression well.  Hips and knees continue to have limited ROM although improve after targeted stretching exercises.  Will continue to progress as tolerated.  Progress per Plan of Care           Timed:         Manual Therapy:         mins  96435;     Therapeutic Exercise:    30     mins  49811 (10 min concurrent);     Neuromuscular Wil:        mins  70620;    Therapeutic Activity:          mins  64016;     Gait Training:           mins  43901;     Ultrasound:          mins  93132;    Ionto                                  mins  00798  Self Care                            mins  36040  Canalith Repos         mins  79735  Orthotic MGMT/Train         mins  82011    Un-Timed:  Electrical Stimulation:         mins  25234 ( );  Dry Needling:          mins  09602 self-pay;  Dry Needling:          mins  36671 self-pay  Traction          mins  79189      Timed Treatment:   30   mins   Total Treatment:     40   mins        PT  SIGNATURE: Mervat Ramachandran PT     License Number: [id number]  Electronically signed by Mervat Ramachandran PT, 07/26/24, 9:44 AM EDT

## 2024-08-02 ENCOUNTER — TELEPHONE (OUTPATIENT)
Dept: INTERNAL MEDICINE | Age: 74
End: 2024-08-02
Payer: MEDICARE

## 2024-08-02 ENCOUNTER — TELEPHONE (OUTPATIENT)
Dept: PHYSICAL THERAPY | Facility: OTHER | Age: 74
End: 2024-08-02
Payer: MEDICARE

## 2024-08-02 ENCOUNTER — OFFICE VISIT (OUTPATIENT)
Dept: ONCOLOGY | Facility: CLINIC | Age: 74
End: 2024-08-02
Payer: MEDICARE

## 2024-08-02 ENCOUNTER — LAB (OUTPATIENT)
Dept: OTHER | Facility: HOSPITAL | Age: 74
End: 2024-08-02
Payer: MEDICARE

## 2024-08-02 VITALS
BODY MASS INDEX: 20.73 KG/M2 | WEIGHT: 175.6 LBS | RESPIRATION RATE: 16 BRPM | DIASTOLIC BLOOD PRESSURE: 73 MMHG | HEIGHT: 77 IN | OXYGEN SATURATION: 96 % | SYSTOLIC BLOOD PRESSURE: 146 MMHG | HEART RATE: 77 BPM | TEMPERATURE: 97.5 F

## 2024-08-02 DIAGNOSIS — D50.9 IRON DEFICIENCY ANEMIA, UNSPECIFIED IRON DEFICIENCY ANEMIA TYPE: ICD-10-CM

## 2024-08-02 DIAGNOSIS — E53.8 B12 DEFICIENCY: Chronic | ICD-10-CM

## 2024-08-02 DIAGNOSIS — D50.9 IRON DEFICIENCY ANEMIA, UNSPECIFIED IRON DEFICIENCY ANEMIA TYPE: Primary | ICD-10-CM

## 2024-08-02 LAB
BASOPHILS # BLD AUTO: 0.03 10*3/MM3 (ref 0–0.2)
BASOPHILS NFR BLD AUTO: 0.6 % (ref 0–1.5)
DEPRECATED RDW RBC AUTO: 50.2 FL (ref 37–54)
EOSINOPHIL # BLD AUTO: 0.24 10*3/MM3 (ref 0–0.4)
EOSINOPHIL NFR BLD AUTO: 4.8 % (ref 0.3–6.2)
ERYTHROCYTE [DISTWIDTH] IN BLOOD BY AUTOMATED COUNT: 14.8 % (ref 12.3–15.4)
FERRITIN SERPL-MCNC: 426.8 NG/ML (ref 30–400)
HCT VFR BLD AUTO: 36.6 % (ref 37.5–51)
HGB BLD-MCNC: 11.6 G/DL (ref 13–17.7)
HGB RETIC QN AUTO: 32.8 PG (ref 29.8–36.1)
IMM GRANULOCYTES # BLD AUTO: 0 10*3/MM3 (ref 0–0.05)
IMM GRANULOCYTES NFR BLD AUTO: 0 % (ref 0–0.5)
IMM RETICS NFR: 4.5 % (ref 3–15.8)
IRON 24H UR-MRATE: 52 MCG/DL (ref 59–158)
IRON SATN MFR SERPL: 18 % (ref 20–50)
LYMPHOCYTES # BLD AUTO: 1.75 10*3/MM3 (ref 0.7–3.1)
LYMPHOCYTES NFR BLD AUTO: 34.9 % (ref 19.6–45.3)
MCH RBC QN AUTO: 29.2 PG (ref 26.6–33)
MCHC RBC AUTO-ENTMCNC: 31.7 G/DL (ref 31.5–35.7)
MCV RBC AUTO: 92.2 FL (ref 79–97)
MONOCYTES # BLD AUTO: 0.45 10*3/MM3 (ref 0.1–0.9)
MONOCYTES NFR BLD AUTO: 9 % (ref 5–12)
NEUTROPHILS NFR BLD AUTO: 2.55 10*3/MM3 (ref 1.7–7)
NEUTROPHILS NFR BLD AUTO: 50.7 % (ref 42.7–76)
NRBC BLD AUTO-RTO: 0 /100 WBC (ref 0–0.2)
PLATELET # BLD AUTO: 160 10*3/MM3 (ref 140–450)
PMV BLD AUTO: 9.5 FL (ref 6–12)
RBC # BLD AUTO: 3.97 10*6/MM3 (ref 4.14–5.8)
RETICS # AUTO: 0.03 10*6/MM3 (ref 0.02–0.13)
RETICS/RBC NFR AUTO: 0.67 % (ref 0.7–1.9)
TIBC SERPL-MCNC: 282 MCG/DL (ref 298–536)
TRANSFERRIN SERPL-MCNC: 189 MG/DL (ref 200–360)
VIT B12 BLD-MCNC: 557 PG/ML (ref 211–946)
WBC NRBC COR # BLD AUTO: 5.02 10*3/MM3 (ref 3.4–10.8)

## 2024-08-02 PROCEDURE — 82607 VITAMIN B-12: CPT | Performed by: INTERNAL MEDICINE

## 2024-08-02 PROCEDURE — 36415 COLL VENOUS BLD VENIPUNCTURE: CPT

## 2024-08-02 PROCEDURE — 85025 COMPLETE CBC W/AUTO DIFF WBC: CPT | Performed by: INTERNAL MEDICINE

## 2024-08-02 PROCEDURE — 85046 RETICYTE/HGB CONCENTRATE: CPT | Performed by: INTERNAL MEDICINE

## 2024-08-02 PROCEDURE — 84466 ASSAY OF TRANSFERRIN: CPT | Performed by: INTERNAL MEDICINE

## 2024-08-02 PROCEDURE — 83540 ASSAY OF IRON: CPT | Performed by: INTERNAL MEDICINE

## 2024-08-02 PROCEDURE — 82728 ASSAY OF FERRITIN: CPT | Performed by: INTERNAL MEDICINE

## 2024-08-02 NOTE — TELEPHONE ENCOUNTER
Mr Hurtado is calling to let Dr Bolton know that he had taken a fall last Sunday.  He had went to [facility] and did blood work and a ct scan of his head. He stated everything looked fine but was told  to call and let his PCP know.  Please advise on what his next step would be, he is feeling fine but not sure if you would want to see him.

## 2024-08-02 NOTE — PROGRESS NOTES
Subjective .     REASONS FOR FOLLOWUP:  Esophageal cancer, cytopenias     HISTORY OF PRESENT ILLNESS:  The patient is a 74 y.o. year old male  who is here for follow-up with the above-mentioned history.    Fell recently and hit his head.  States ER workup was unremarkable.  States he fell because he tripped over his orthopedic shoe that he was wearing at the time.  No longer wearing these   Feeling fine.  States no trouble swallowing.  No bleeding.  No chest pain or SOA.  No fever, chills, weight loss, night sweats    Past Medical History:   Diagnosis Date    Acute deep vein thrombosis (DVT) of popliteal vein of left lower extremity 03/24/2020    Anemia     Anti-phospholipid syndrome     On lifelong anticoagulation therapy    Bladder disorder     LEAKAGE   ON MED  wers pads    Bleeding hemorrhoids     Chronic kidney disease     Community acquired pneumonia     HISTORY OF IN 2014    Congestive heart failure     COPD (chronic obstructive pulmonary disease)     Deep venous thrombosis 2006, 2008    Left lower extremity multiple    Depression     Esophageal carcinoma 12/31/2014    had chemo and radiation prior surgery    Gastroparesis     Hemorrhoids     HH (hiatus hernia)     History of atrial fibrillation 2015    ONE EPISODE WHILE HOSPITALIZED    History of kidney stones     History of nephrolithiasis     History of pancreatitis     PT STATES MANY YEARS AGO    History of radiation therapy     History of transfusion     Hypertension     Long-term (current) use of anticoagulants, INR goal 2.0-3.0     Lymphedema     Malignant neoplasm of prostate     Other hyperlipidemia 01/30/2018 January 30, 2018 lipid panel risk 12.8%    Restless legs syndrome     Sleep apnea     OCCASIONALLY WEARS CPAP    Squamous carcinoma     on the head     Past Surgical History:   Procedure Laterality Date    APPENDECTOMY  1950    BRONCHOSCOPY      (Diagnostic)    CARDIAC CATHETERIZATION N/A 05/14/2022    Procedure: Left Heart Cath;  Surgeon:  Eric So MD;  Location: [facility] CATH INVASIVE LOCATION;  Service: Cardiology;  Laterality: N/A;    CARDIAC CATHETERIZATION N/A 05/14/2022    Procedure: Coronary angiography;  Surgeon: Eric So MD;  Location: [facility] CATH INVASIVE LOCATION;  Service: Cardiology;  Laterality: N/A;    CARDIAC CATHETERIZATION N/A 05/14/2022    Procedure: Left ventriculography;  Surgeon: Eric So MD;  Location: [facility] CATH INVASIVE LOCATION;  Service: Cardiology;  Laterality: N/A;    CATARACT EXTRACTION Bilateral 2014    COLONOSCOPY  12/15/2014    Complete / Description: EH, IH, torts, stool, follow-up colonoscopy due in 5 years.    COLONOSCOPY N/A 06/13/2017    non-thrombosed external hemorrhoids, normal examined ileum, IH    COLONOSCOPY N/A 02/26/2019    Procedure: COLONOSCOPY with Cold Polypectomy;  Surgeon: Mulugeta Millan MD;  Location: [facility] ENDOSCOPY;  Service: Gastroenterology    COLONOSCOPY N/A 12/16/2020    Procedure: COLONOSCOPY to cecum into TI;  Surgeon: Mesha Sanchez MD;  Location: [facility] ENDOSCOPY;  Service: Gastroenterology;  Laterality: N/A;  pre: lower GI bleed  post: hemorrhoids     CYSTOSCOPY W/ LASER LITHOTRIPSY      ENDOSCOPY N/A 06/13/2017    Procedure: ESOPHAGOGASTRODUODENOSCOPY WITH COLD BIOPSY;  Surgeon: Lake Gonzalez MD;  Location: [facility] ENDOSCOPY;  Service:     ENDOSCOPY N/A 11/18/2021    Procedure: ESOPHAGOGASTRODUODENOSCOPY WITH  DILATATION WITH FLUOROSCOPY;  Surgeon: Jose Christine III, MD;  Location: [facility] ENDOSCOPY;  Service: Gastroenterology;  Laterality: N/A;  Pre: dysphagia, h/x of adinocarcinoma of esophagus  Post: same    ENDOSCOPY N/A 08/07/2023    Procedure: ESOPHAGOGASTRODUODENOSCOPY;  Surgeon: Jameel Valencia MD;  Location: [facility] ENDOSCOPY;  Service: Gastroenterology;  Laterality: N/A;  PRE- DYSPHAGIA  POST-ABORTED DUE TO RETAINED FOOD    ENDOSCOPY N/A 08/08/2023    Procedure: ESOPHAGOGASTRODUODENOSCOPY with bx;  Surgeon: Annie  Jameel CASTILLO MD;  Location[facility] END[facility]COPY;  Service: Gastroenterology;  Laterality: N/A;  pre: N/V, abd pain  post: esophageal nodule, retained food    ESOPHAGECTOMY      April 2015, stage IIB esophageal carcinoma, sub-total resection.    ESOPHAGECTOMY      Esophagectomy Subtotal Andrea Joe Procedure    EXCISION LESION  08/2012    Removal of Squamous Cell CA on Head    HAMMER TOE REPAIR  09/2014    Hammertoe Operation (Each Toe), 10/2014    HAMMER TOE REPAIR Left 10/03/2017    Procedure: Left second third and fourth distal interphalangeal joint resection with flexor tenotomy;  Surgeon: Mulugeta Lira MD;  Location:  [facility];  Service:     HEMORRHOIDECTOMY N/A 12/23/2020    Procedure: HEMORRHOIDECTOMY;  Surgeon: Billy Julio Jr., MD;  Location: [facility] MAIN OR;  Service: General;  Laterality: N/A;    HERNIA REPAIR      incisional    JEJUNOSTOMY      Laparoscopic    JEJUNOSTOMY      tube removal     KNEE SURGERY Bilateral 1967, 1973, 1981    PATELLA SURGERY Left     removed    PILONIDAL CYST / SINUS EXCISION      MO ARTHRP KNE CONDYLE&PLATU MEDIAL&LAT COMPARTMENTS Right 03/26/2018    Procedure: TOTAL KNEE ARTHROPLASTY;  Surgeon: Renny Solis MD;  Location: [facility] MAIN OR;  Service: Orthopedics    PROSTATECTOMY  2010    PYLOROPLASTY      SIGMOIDOSCOPY N/A 08/02/2023    Procedure: SIGMOIDOSCOPY FLEXIBLE;  Surgeon: Mesha Sanchez MD;  Location: [facility] ENDOSCOPY;  Service: Gastroenterology;  Laterality: N/A;  PRE- ABNORMAL CT  POST- HEMORRHOIDS, ULCERATION    SPINAL FUSION  02/1998    C 5,6    TONSILLECTOMY      UPPER GASTROINTESTINAL ENDOSCOPY  12/15/2014    LA Grade D esophagitis, Pardo's, HH, multiple duodenal ulcers    VENTRAL/INCISIONAL HERNIA REPAIR N/A 04/14/2016    Procedure: VENTRAL/INCISIONAL HERNIA REPAIR, open, with mesh, and component separation;  Surgeon: Darren Rivas MD;  Location: [facility] MAIN OR;  Service:        HEMATOLOGIC/ONCOLOGIC HISTORY:  (History from previous  "dates can be found in the separate document.)    MEDICATIONS    Current Outpatient Medications:     acetaminophen (TYLENOL) 325 MG tablet, Take 2 tablets by mouth Every 6 (Six) Hours As Needed for Mild Pain, Headache or Fever., Disp: , Rfl:     atorvastatin (LIPITOR) 40 MG tablet, Take 1 tablet by mouth Every Night., Disp: 90 tablet, Rfl: 2    Cholecalciferol (Vitamin D3) 50 MCG (2000 UT) capsule, Take 1 capsule by mouth Daily., Disp: , Rfl:     cyanocobalamin 1000 MCG/ML injection, Inject 1 mL under the skin into the appropriate area as directed Every 30 (Thirty) Days. Indications: Inadequate Vitamin B12, Disp: 4 mL, Rfl: 5    Eliquis 5 MG tablet tablet, TAKE ONE TABLET BY MOUTH EVERY 12 HOURS, Disp: 60 tablet, Rfl: 1    EPINEPHrine, Anaphylaxis, (ADRENALIN) 1 MG/ML injection, Inject 0.3 mL into the appropriate muscle as directed by prescriber As Needed for Anaphylaxis. Indications: Life-Threatening Hypersensitivity Reaction, Disp: , Rfl:     gabapentin (Neurontin) 100 MG capsule, Take 1 capsule by mouth 3 (Three) Times a Day., Disp: 90 capsule, Rfl: 2    metoclopramide (Reglan) 5 MG tablet, Take 1 tablet by mouth 2 (Two) Times a Day. You will need to schedule an office apt for further refills, Disp: 60 tablet, Rfl: 5    mupirocin (BACTROBAN) 2 % ointment, Apply  topically to the appropriate area as directed., Disp: , Rfl:     PARoxetine (PAXIL) 40 MG tablet, TAKE ONE TABLET BY MOUTH EVERY MORNING, Disp: 30 tablet, Rfl: 5    polyethylene glycol (MIRALAX) 17 g packet, Take 17 g by mouth Daily. (Patient taking differently: Take 17 g by mouth Daily.), Disp: 30 each, Rfl: 0    pramipexole (MIRAPEX) 1.5 MG tablet, Take 1 tablet by mouth 2 (Two) Times a Day. Indications: Restless Leg Syndrome, Disp: 180 tablet, Rfl: 1    furosemide (LASIX) 20 MG tablet, Take 1 tablet by mouth Daily., Disp: , Rfl:     ALLERGIES:     Allergies   Allergen Reactions    Sulfamethoxazole-Trimethoprim Nausea Only     PT. REPORTS \"I DONT KNOW OF " "ANYTHING IM ALLERGIC TO\" This nurse did not remove it as it had been placed 2023       SOCIAL HISTORY:       Social History     Socioeconomic History    Marital status:      Spouse name: Lena    Number of children: 0    Years of education: College   Tobacco Use    Smoking status: Former     Current packs/day: 0.00     Average packs/day: 2.0 packs/day for 3.0 years (6.0 ttl pk-yrs)     Types: Cigarettes     Start date:      Quit date:      Years since quittin.6     Passive exposure: Past (father smoked in the home)    Smokeless tobacco: Former     Types: Chew    Tobacco comments:     Caffeine - coffee and soda    Vaping Use    Vaping status: Never Used   Substance and Sexual Activity    Alcohol use: Yes     Alcohol/week: 3.0 standard drinks of alcohol     Types: 1 Glasses of wine, 1 Cans of beer, 1 Shots of liquor per week     Comment: 2    Drug use: Never     Comment: hx marijuana use \"a long time ago\"    Sexual activity: Defer     Partners: Male     Birth control/protection: Contraceptive sponge         FAMILY HISTORY:  Family History   Problem Relation Age of Onset    Cerebral aneurysm Mother         cerebral artery aneurysm ( age 56)    Anxiety disorder Father     Suicide Attempts Father          of suicide    Cancer Father         bladder    Prostate cancer Brother 68    No Known Problems Brother     Pancreatic cancer Nephew     Colon cancer Neg Hx     Esophageal cancer Neg Hx     Dementia Neg Hx     Malig Hyperthermia Neg Hx        REVIEW OF SYSTEMS:    Review of Systems   Constitutional:  Negative for activity change.   HENT:  Negative for nosebleeds and trouble swallowing.    Respiratory:  Negative for wheezing.    Cardiovascular:  Negative for chest pain and palpitations.   Gastrointestinal:  Negative for constipation and diarrhea.   Genitourinary:  Negative for dysuria and hematuria.   Musculoskeletal:  Negative for arthralgias and myalgias.   Neurological:  Negative for " "seizures and syncope.   Hematological:  Negative for adenopathy. Does not bruise/bleed easily.   Psychiatric/Behavioral:  Negative for confusion.            Objective    Vitals:    08/02/24 1456   BP: 146/73   Pulse: 77   Resp: 16   Temp: 97.5 °F (36.4 °C)   TempSrc: Oral   SpO2: 96%   Weight: 79.7 kg (175 lb 9.6 oz)   Height: 195.6 cm (77.01\")   PainSc: 0-No pain           8/2/2024     2:52 PM   Current Status   ECOG score 1      PHYSICAL EXAM:        CONSTITUTIONAL:  Vital signs reviewed.  No distress, looks comfortable.  EYES:  Conjunctiva and lids unremarkable.  PERRLA  EARS,NOSE,MOUTH,THROAT:  Ears and nose appear unremarkable.  Lips, teeth, gums appear unremarkable.  RESPIRATORY:  Normal respiratory effort.  Lungs clear to auscultation bilaterally.  CARDIOVASCULAR:  Normal S1, S2.  No murmurs rubs or gallops.  Significant lateral lower extremity edema unchanged  GASTROINTESTINAL: Abdomen appears unremarkable.  Nontender.  No hepatomegaly.  No splenomegaly.  LYMPHATIC:  No cervical, supraclavicular, axillary lymphadenopathy.  SKIN:  Warm.  No rashes.  PSYCHIATRIC:  Normal judgment and insight.  Normal mood and affect.          RECENT LABS:        WBC   Date/Time Value Ref Range Status   08/02/2024 02:49 PM 5.02 3.40 - 10.80 10*3/mm3 Final   07/28/2024 09:10 PM 4.28 (L) 4.5 - 11.0 10*3/uL Final     Hemoglobin   Date/Time Value Ref Range Status   08/02/2024 02:49 PM 11.6 (L) 13.0 - 17.7 g/dL Final   07/28/2024 09:10 PM 10.8 (L) 13.5 - 17.5 g/dL Final     Platelets   Date/Time Value Ref Range Status   08/02/2024 02:49  140 - 450 10*3/mm3 Final   07/28/2024 09:10  140 - 440 10*3/uL Final       Assessment/Plan     ASSESSMENT:  There are no diagnoses linked to this encounter.      *Esophageal adenocarcinoma. Initially T2N0M0. After neoadjuvant carboplatin/Taxol with radiation, achieved a pathologic CR at resection, 4/24/2015.   As per NCCN guidelines, plan CAT scans every 6 months x1 year, then every 6 to " 9 months on years 2 and years 3. Defer any surveillance EGDs to Dr. Gonzalez if he feels they are appropriate.   Also as per NCCN guidelines, plan H and P every 3 to 6 months on years 1 and years 2, then every 6 to 12 months' time on years 3 through years 5 and then annually.   EGD 6/13/17 by Dr. Gonzalez: No evidence of recurrence.  CT scan 3/2/18 (this completed 3 years of surveillance CT): No evidence of recurrence.  Plan no more surveillance CT.  EGD 11/18/2021, Dr. Christine: No evidence of recurrent cancer.  Dilated.  No signs of recurrence.  Remaining in remission.    *Recurrent DVT, prior to cancer diagnosis.    Dr. Bolton previously managed his Coumadin.   Chronic left leg larger than right, since DVT  Acute left popliteal, gastrocnemius DVT on 3/24/2020 despite INR 3.4.  Therefore, changed to Eliquis.  On 3/25/2020, unremarkable: Lupus anticoagulant, beta-2 glycoprotein antibodies.  Anticardiolipin antibodies also felt to be unremarkable with IgG and IgM both at 15 (negative is <15.  Indeterminate is 15-20).  No signs of recurrent DVT.  Continuing on Eliquis.  No bleeding problems    *CT angiogram chest 3/24/2020 (LLE acute DVT found 3/24/2020): Mild increased opacity LLL may represent developing infiltrate versus atelectasis.  Radiologist recommended follow-up.  CT chest 5/1/2020: Resolution of groundglass LLL infiltrate from the 3/24/2020 CT.  A few mildly enlarged mediastinal nodes are less prominent than on the 3/18/2020 CT.  No new abnormalities.  Plan no more follow-up CT scans.    *Persistent cough.  He has seen Dr. Maher Sayied for this.    He did not complain of this today.    *Previously complained of emesis occurring 5 hours after eating on average once every month or 2.  No nausea otherwise.  Denies dysphagia or odynophagia.    Unchanged.  Occurring on average once every couple of months.  He did not complain of this today    *Iron deficiency anemia  Hb mostly around 11  On 4/15/2019, ferritin 29.7, 13%  saturation.  Serum folate normal.  On oral iron daily through PCP.  On 8/23/19, ferritin 65, 14%.   Increased oral iron.   On 10/15/19, ferritin 72, 10%.  On 10/25/2019, stopped oral iron since it was not helping.    Oral iron not effective due to poor absorption  2 doses Injectafer.  On 12/13/2019, ferritin 708, 15% saturation, Hb 10.4.  Therefore, no IV iron.  Monitor.  On 5/5/2020, ferritin 346, 15% saturation, Hb 9.9.  Therefore, no need for IV iron at this time.  On 7/7/2020, Hb up to 11.7.  Iron labs normal.  Admission 12/15/2020: Hb 6.6.  Ferritin 40, iron saturation 17%.  Discharged on 12/21/2020 with Hb 7.1, which fell from 7.9 on 12/18/2020, from 8.9 in the early morning 12/18/2020.  The drop in Hb was thought to be due to hemorrhoidal bleeding related to Eliquis.  Eliquis was stopped.  Hemorrhoidal repair planned by Dr. Flynn Julio after the holidays.  Was given Venofer 300 mg on 12/17/2020 by Dr. Ross of our practice  On 12/29/2020, ferritin 176, 13% saturation, Hb 8.4, reticulate hemoglobin low at 25.7.  Suspect he would benefit from some more iron.  Given 1 dose Injectafer.  On 2/2/2021, ferritin 317, 17% saturation, Hb 10.3.  Therefore, Hb gradually improved since the 1 dose of Injectafer.  3/2/2021: Ferritin iron 93, 11% saturation, Hb 11.9.  1 dose Injectafer.  3/23/2021: Ferritin 471, 20% saturation, Hb 10.8  7/6/2021: Ferritin 329, 21% saturation, Hb 10.8  9/28/2021: Ferritin 230, 20% saturation, Hb 11.4.  No need for IV iron.  12/28/2021: Ferritin 337, 6% iron saturation, Hb 10.4.  No IV iron given.  (Although saturation is low, ferritin is 337).  3/22/2022: Ferritin 112, 12% saturation, Hb 10.6.  Plan 1 dose Injectafer.  5/31/2022: Ferritin 473, 23% saturation, Hb 11.4  8/30/2022: Ferritin 308, 17% saturation, Hb 11.7.  No need for Injectafer  11/22/2022: Ferritin 268, 14% saturation.  Hb 11.9.  No need for Injectafer.  2/15/2023: Hb 11.  Ferritin 247, 19% saturation.  No need for  Injectafer.  5/16/2023: Hb 12.  Ferritin 358, 15% saturation.  No need for Injectafer.  8/15/23: Hb 10.3  11/9/2023: Hb 9.4.  Ferritin 458, 8% saturation.  Arrange 1 dose Injectafer.  If insurance will not cover Injectafer then arrange Venofer 200 mg x 2 doses  1/16/2024: Hb 10, ferritin 234, 20% saturation  5/7/2024: Hb 11.9.  Ferritin 286, 10% saturation.  Venofer 200 mg x 3 doses  8/2/2024: Hb 11.6.  Ferritin 427, 18% saturation.  No need for Venofer    *Hemorrhoidal bleeding with Hb down to 6.6, requiring admission, 12/15/2020.  Thought to be related to Eliquis.  Eliquis was stopped.  Hemorrhoidal repair by Dr. Flynn Julio, 12/23/2020  Eliquis subsequently restarted with no more issues with bleeding.  1 episode of dark stools yesterday, 9/27/2021.  Told him if he notices more of this he should contact Dr. Sanchez.  3/22/2022: Denies any rectal bleeding.  States this has not really been an issue since the hemorrhoidal repair  8/30/2022: Denies bleeding  11/22/2022: Denies bleeding  5/16/2023: Denies bleeding  11/9/2023: Denies bleeding    *9/28/2021: Change in stool frequency.  Was having a bowel movement 3 times per week.  For the past week has been having 3 formed bowel movements per day.  I told him if this persists he should discuss with Dr. Sanchez.    *Source of iron deficiency.  Last colonoscopy 2/26/2019 by Dr. Millan.  Last EGD 6/13/2017 by Dr. Gonzalez.  Suspect he has an absorption issue due to previous esophageal surgery.    *B12 deficiency.  On 6/6/2019, B12 <150  On B12 injections through PCP.  B12 on 5/5/2020 normal at 694  B12 on 2/2/2021, 789  5/16/2023 B12 1124  11/9/2023: B12 615  B12 557    *Anemia for reasons in addition to iron deficiency and B12 deficiency  Baseline Hb mostly 10.5-11.5.  On 5/5/2020, unremarkable: RBC folate, iron labs, B12, haptoglobin, TB, LDH, direct Maki.  Creatinine baseline is 0.9-1.2   Hb 9.9 on 5/5/2020  On 7/7/2020, Hb up to 11.7.  Return to prior follow-up  plan.  On 2/2/2021, B12 and RBC folate unremarkable  3/23/2021: Hb 10.8 despite normal iron stores.  5/25/2021, Hb 10.7 with normal iron labs  7/6/2021, Hb 10.8 with normal iron labs  Hb 11.6, from 11.9, from 10, from 9.4, from 10.3, from 12, from 11, from 10.2, from 11.9, from 11.7, from 11.4    *Leukocytopenia  New issue on 3/23/2021, WBC 3.38, based on recent labs, but WBC has been as low as 2.9 December 2019  WBC 5, from 4.8, from 3.9, from 6.4, from 4.6, from 3.9, from 4.3, from 4.1, from 4.6, from 4    *Neutropenia  ANC 2050, from 1590 on 3/23/2021 (previously ANC has been as low as 1480 with WBC in the low normal range)  ANC 2550, from 1480, from 4230, from 2910, from 2150, from 2160, from 2110, from 2560, from 1690    *Thrombocytopenia  New issue on 3/23/2021, , based on recent labs, but PLT has been as low as 119 October 2019  , from 158, from 167, from 274, from 201, from 180, from 163, from 155, from 174, from 158    *Dyspnea on exertion x2 weeks on 11/22/2022 visit  Advised to discuss with his PCP and in the meantime if it worsens he should go to the ER    *Admitted early January 2023 for respiratory failure due to RSV.  Also had A-fib with RVR.    *He had a white blood cell count in the upper 3s and a hemoglobin in the upper 12s with a normal platelet count prior to beginning chemotherapy.     PLAN:  MD CBC stat ferritin, iron panel, reticulate hemoglobin, B12 level 3 months  (I offered 6-month follow-up but he prefers to maintain 3-month)  Baseline Hb when not in the hospital mostly 9.5-11.5  Continue Eliquis  Venofer 200 mg x 3 doses.    hemorrhoids have been repaired.  Last drop of Hb was down to 7.1 on 12/21/2020.  (Hemorrhoidal bleeding)    Prior plan was:  MD every 6-month.  No more surveillance CT scans.  He does not have a port.    I have discussed with him if Hb drops and remains around 9 or so, we may want to plan a bone marrow biopsy

## 2024-08-02 NOTE — TELEPHONE ENCOUNTER
Caller: Jose Hurtado    Relationship: Self    What was the call regarding: PATIENT CANCELLED APPT TODAY BECAUSE THEY HAD A FALL

## 2024-08-05 ENCOUNTER — TELEPHONE (OUTPATIENT)
Dept: INTERNAL MEDICINE | Age: 74
End: 2024-08-05

## 2024-08-05 ENCOUNTER — OFFICE VISIT (OUTPATIENT)
Dept: INTERNAL MEDICINE | Age: 74
End: 2024-08-05
Payer: MEDICARE

## 2024-08-05 VITALS
WEIGHT: 169 LBS | BODY MASS INDEX: 19.96 KG/M2 | OXYGEN SATURATION: 96 % | TEMPERATURE: 97.3 F | DIASTOLIC BLOOD PRESSURE: 64 MMHG | HEIGHT: 77 IN | HEART RATE: 74 BPM | SYSTOLIC BLOOD PRESSURE: 110 MMHG

## 2024-08-05 DIAGNOSIS — R11.10 DRY HEAVES: Primary | ICD-10-CM

## 2024-08-05 PROBLEM — R14.2 FUNCTIONAL BELCHING DISORDER: Status: ACTIVE | Noted: 2024-08-05

## 2024-08-05 PROCEDURE — 3074F SYST BP LT 130 MM HG: CPT | Performed by: PHYSICIAN ASSISTANT

## 2024-08-05 PROCEDURE — 99213 OFFICE O/P EST LOW 20 MIN: CPT | Performed by: PHYSICIAN ASSISTANT

## 2024-08-05 PROCEDURE — 1126F AMNT PAIN NOTED NONE PRSNT: CPT | Performed by: PHYSICIAN ASSISTANT

## 2024-08-05 PROCEDURE — 3078F DIAST BP <80 MM HG: CPT | Performed by: PHYSICIAN ASSISTANT

## 2024-08-05 RX ORDER — ONDANSETRON 4 MG/1
4 TABLET, ORALLY DISINTEGRATING ORAL EVERY 8 HOURS PRN
Qty: 45 TABLET | Refills: 0 | Status: SHIPPED | OUTPATIENT
Start: 2024-08-05

## 2024-08-05 NOTE — ASSESSMENT & PLAN NOTE
Dietary hygiene counseled to include monitoring or reducing intake of:  Dairy including cheese, ice cream, milk.  Vegetables specifically broccoli, asparagus, cabbage, cauliflower, onions, leaks etc.  Whole grains including bagels, bran cereal, wheat.  Legumes including baked beans, lima beans, peas.  Fatty foods including fried foods and pork.  Carbonated beverages including beers, carbonated hawley, and sodas.  Miscellaneous additives including artificial sweeteners and chewing gums.

## 2024-08-05 NOTE — TELEPHONE ENCOUNTER
Spoke to Mr. Start at 5:06 PM on 8/5/2024.    His question was if repeat imaging would be necessary, as he heard that some findings could be delayed.  I informed him that his ER imaging including CTs of the head and C-spine were both normal following his fall.  I reassured him that routine repeat imaging is not routine following head imaging for falls, unless he is experiencing status changes including things like headaches, photophobia, nausea, vomiting, ringing in the ears.  He was notified that he can contact our office or return for follow-up if his status to change  He felt reassured and agreed with the plan.

## 2024-08-05 NOTE — PROGRESS NOTES
"    I N T E R N A L  M E D I C I N E    Nirmal Magana PA-C      ENCOUNTER DATE:  08/05/2024    Jose Hurtado / 74 y.o. / male    CHIEF COMPLAINT / REASON FOR OFFICE VISIT     Abdominal Pain (Dry hives since Esophageal carcinoma) and ER f/u -fall (7/28 fall and went to ER)      ASSESSMENT & PLAN   Diagnoses and all orders for this visit:    1. Dry heaves (Primary)  Assessment & Plan:  Dietary hygiene counseled to include monitoring or reducing intake of:  Dairy including cheese, ice cream, milk.  Vegetables specifically broccoli, asparagus, cabbage, cauliflower, onions, leaks etc.  Whole grains including bagels, bran cereal, wheat.  Legumes including baked beans, lima beans, peas.  Fatty foods including fried foods and pork.  Carbonated beverages including beers, carbonated hawley, and sodas.  Miscellaneous additives including artificial sweeteners and chewing gums.     Orders:  -     ondansetron ODT (ZOFRAN-ODT) 4 MG disintegrating tablet; Place 1 tablet on the tongue Every 8 (Eight) Hours As Needed for Nausea or Vomiting (for nausea and dry heaves).  Dispense: 45 tablet; Refill: 0         Next Appointment with me:   Return if symptoms worsen or fail to improve. And, for  scheduled follow-up with Dr. Bolton on 11/06/2024.        VITAL SIGNS     Vitals:    08/05/24 1007   BP: 110/64   Pulse: 74   Temp: 97.3 °F (36.3 °C)   SpO2: 96%   Weight: 76.7 kg (169 lb)   Height: 195.6 cm (77.01\")       @BP@  Wt Readings from Last 3 Encounters:   08/05/24 76.7 kg (169 lb)   08/02/24 79.7 kg (175 lb 9.6 oz)   07/05/24 79.8 kg (176 lb)     Body mass index is 20.04 kg/m².      MEDICATIONS AT THE TIME OF OFFICE VISIT     Current Outpatient Medications on File Prior to Visit   Medication Sig    acetaminophen (TYLENOL) 325 MG tablet Take 2 tablets by mouth Every 6 (Six) Hours As Needed for Mild Pain, Headache or Fever.    atorvastatin (LIPITOR) 40 MG tablet Take 1 tablet by mouth Every Night.    Cholecalciferol (Vitamin D3) 50 MCG " (2000 UT) capsule Take 1 capsule by mouth Daily.    cyanocobalamin 1000 MCG/ML injection Inject 1 mL under the skin into the appropriate area as directed Every 30 (Thirty) Days. Indications: Inadequate Vitamin B12    Eliquis 5 MG tablet tablet TAKE ONE TABLET BY MOUTH EVERY 12 HOURS    EPINEPHrine, Anaphylaxis, (ADRENALIN) 1 MG/ML injection Inject 0.3 mL into the appropriate muscle as directed by prescriber As Needed for Anaphylaxis. Indications: Life-Threatening Hypersensitivity Reaction    gabapentin (Neurontin) 100 MG capsule Take 1 capsule by mouth 3 (Three) Times a Day.    metoclopramide (Reglan) 5 MG tablet Take 1 tablet by mouth 2 (Two) Times a Day. You will need to schedule an office apt for further refills    mupirocin (BACTROBAN) 2 % ointment Apply  topically to the appropriate area as directed.    PARoxetine (PAXIL) 40 MG tablet TAKE ONE TABLET BY MOUTH EVERY MORNING    polyethylene glycol (MIRALAX) 17 g packet Take 17 g by mouth Daily. (Patient taking differently: Take 17 g by mouth Daily.)    pramipexole (MIRAPEX) 1.5 MG tablet Take 1 tablet by mouth 2 (Two) Times a Day. Indications: Restless Leg Syndrome    furosemide (LASIX) 20 MG tablet Take 1 tablet by mouth Daily. (Patient not taking: Reported on 8/5/2024)     No current facility-administered medications on file prior to visit.          HISTORY OF PRESENT ILLNESS     Pt is a 75y/o wm with history of gastroparesis, esophageal cancer, and recurrent DVT who presents with complaint of abdominal comfort and.    He reports a history of recurrent episodes of overwhelming sensations nausea causing him to dry-heave uncontrollably, which seem to occur every several months and last several minutes. His current episode began around 4 days ago. He states that his symptoms began suddenly, are accompanied by phlegm, and last 4-to-5 minutes with about 20 minutes of reprieve in between bouts.  He further admits mild cough. He denies pain, vomiting, belching, bowel  changes, dietary changes, medication changes, recent illnesses, or recent travel.  Not currently experiencing any symptoms on today.    Bowel movements consistent at an every-other-day frequency and stools are form, which is his normal.     Followed by Dr. Millan of gastroenterology and Dr. Capone of thoracic surgery, both of whom he is seen by every 6 months.  Next scheduled with GI appointment on 8/13/2024, next thoracic appointment on 12/19/2024.  Pertinent esophageal history of esophagectomy, dysphagia, multiple esophageal dilations.  Pertinent abdominal history of delayed gastric emptying secondary to esophagectomy with gastric pull-through.  He is prescribed Reglan 5 mg twice daily along with a low residue diet.    CT abdomen pelvis with IV contrast completed on 6/14/2024 demonstrated a mild right hydronephrosis and hydroureter without etiology, nonobstructive bilateral nephrolithiasis, and trace right pleural fluid collection without surrounding rim related to postoperative changes of esophagectomy thought to be chronic.    Recent ER visit on 7/28/2024 when patient reported to [place] ER following a trip and fall where he hit his head.  Denied loss of consciousness or dizziness at that time.  No acute abnormalities administrated on CTs of the head and C-spine completed on 7/28/2024.    Followed by Dr. Code of [facility] hematology oncology.  DVTs managed with Eliquis since March 2020 when therapy was switched from Coumadin.  Most recent DVT was 2 left popliteal on 3/24/2020.  Hemodynamically stable at hematology visit on 8/2/2024, which was 5 days post fall.    REVIEW OF SYSTEMS     Review of Systems   As per HPI      PHYSICAL EXAMINATION     Physical Exam  Vitals and nursing note reviewed.   Constitutional:       General: He is not in acute distress.     Appearance: Normal appearance. He is not ill-appearing.   Cardiovascular:      Rate and Rhythm: Normal rate and regular rhythm.      Pulses: Normal pulses.       Heart sounds: Normal heart sounds.   Pulmonary:      Effort: Pulmonary effort is normal.      Breath sounds: Normal breath sounds.   Abdominal:      General: Abdomen is flat. Bowel sounds are normal. There is no distension.      Palpations: Abdomen is soft. There is no mass.      Tenderness: There is abdominal tenderness. There is no right CVA tenderness, left CVA tenderness, guarding or rebound.      Hernia: No hernia is present.   Neurological:      Mental Status: He is alert.             REVIEWED DATA     Labs:           Imaging:           Medical Tests:           Summary of old records / correspondence / consultant report:           Request outside records:

## 2024-08-05 NOTE — TELEPHONE ENCOUNTER
Caller: Jose Hurtado    Relationship to patient: Self    Best call back number:[phone]     Patient is needing: WANTS TO KNOW IF HE SHOULD HAVE A FOLLOW UP CT SCAN FROM HEAD INJURY PLEASE CALL AND ADVISE

## 2024-08-12 ENCOUNTER — TREATMENT (OUTPATIENT)
Age: 74
End: 2024-08-12
Payer: MEDICARE

## 2024-08-12 DIAGNOSIS — R26.89 BALANCE PROBLEM: Primary | ICD-10-CM

## 2024-08-12 DIAGNOSIS — M54.50 CHRONIC BILATERAL LOW BACK PAIN WITHOUT SCIATICA: ICD-10-CM

## 2024-08-12 DIAGNOSIS — G89.29 CHRONIC BILATERAL LOW BACK PAIN WITHOUT SCIATICA: ICD-10-CM

## 2024-08-12 DIAGNOSIS — M25.562 CHRONIC PAIN OF BOTH KNEES: ICD-10-CM

## 2024-08-12 DIAGNOSIS — R53.81 PHYSICAL DECONDITIONING: ICD-10-CM

## 2024-08-12 DIAGNOSIS — G89.29 CHRONIC PAIN OF BOTH KNEES: ICD-10-CM

## 2024-08-12 DIAGNOSIS — R26.89 DECREASED MOBILITY: ICD-10-CM

## 2024-08-12 DIAGNOSIS — M25.561 CHRONIC PAIN OF BOTH KNEES: ICD-10-CM

## 2024-08-12 PROCEDURE — 97112 NEUROMUSCULAR REEDUCATION: CPT | Performed by: PHYSICAL THERAPIST

## 2024-08-12 PROCEDURE — 97110 THERAPEUTIC EXERCISES: CPT | Performed by: PHYSICAL THERAPIST

## 2024-08-12 NOTE — PROGRESS NOTES
Physical Therapy Treatment Note  [facility]   [address] [place] [address]  P: [phone]       F: [phone]      Patient: Jose Hurtado   : 1950  Treatment Diagnosis:     ICD-10-CM ICD-9-CM   1. Balance problem  R26.89 781.99   2. Decreased mobility  R26.89 781.99   3. Physical deconditioning  R53.81 799.3   4. Chronic pain of both knees  M25.561 719.46    M25.562 338.29    G89.29    5. Chronic bilateral low back pain without sciatica  M54.50 724.2    G89.29 338.29     Referring practitioner: Brandin Bolton MD  Date of Initial Visit: Type: THERAPY  Noted: 2024  Today's Date: 2024  Patient seen for 19 sessions           Subjective   Patient reports he is feeling better from his fall a few weeks ago.  States his legs feel good and he wants to challenge them more.    Objective     See Exercise, Manual, and Modality Logs for complete treatment.       Assessment/Plan  Patient performed program with progressed exercises and parameters.  He tolerated progression well with no adverse symptoms.  He continues with hip and knee contractures-tolerating stretching well and has improved standing posture post stretching.  Progress per Plan of Care and Progress strengthening /stabilization /functional activity           Timed:         Manual Therapy:         mins  17758;     Therapeutic Exercise:    30     mins  89520;     Neuromuscular Wil:    10    mins  91024;    Therapeutic Activity:          mins  78924;     Gait Training:           mins  42347;     Ultrasound:          mins  80985;    Ionto                                  mins  82998  Self Care                            mins  34322  Canalith Repos         mins  92939  Orthotic MGMT/Train         mins  45343    Un-Timed:  Electrical Stimulation:         mins  24121 ( );  Dry Needling:          mins  79676 self-pay;  Dry Needling:          mins   self-pay  Traction          mins   95039      Timed Treatment:   40   mins   Total Treatment:     40   mins        PT SIGNATURE: Mervat Ramachandran PT     License Number: [id number]  Electronically signed by Mervat Ramachandran PT, 08/12/24, 10:46 AM EDT

## 2024-08-15 ENCOUNTER — OFFICE VISIT (OUTPATIENT)
Dept: GASTROENTEROLOGY | Facility: CLINIC | Age: 74
End: 2024-08-15
Payer: MEDICARE

## 2024-08-15 ENCOUNTER — TELEPHONE (OUTPATIENT)
Dept: GASTROENTEROLOGY | Facility: CLINIC | Age: 74
End: 2024-08-15
Payer: MEDICARE

## 2024-08-15 ENCOUNTER — TREATMENT (OUTPATIENT)
Age: 74
End: 2024-08-15
Payer: MEDICARE

## 2024-08-15 VITALS
WEIGHT: 176.2 LBS | BODY MASS INDEX: 20.8 KG/M2 | HEART RATE: 73 BPM | HEIGHT: 77 IN | DIASTOLIC BLOOD PRESSURE: 71 MMHG | SYSTOLIC BLOOD PRESSURE: 144 MMHG

## 2024-08-15 DIAGNOSIS — R26.89 DECREASED MOBILITY: ICD-10-CM

## 2024-08-15 DIAGNOSIS — G89.29 CHRONIC BILATERAL LOW BACK PAIN WITHOUT SCIATICA: ICD-10-CM

## 2024-08-15 DIAGNOSIS — R53.81 PHYSICAL DECONDITIONING: ICD-10-CM

## 2024-08-15 DIAGNOSIS — R26.89 BALANCE PROBLEM: Primary | ICD-10-CM

## 2024-08-15 DIAGNOSIS — M54.50 CHRONIC BILATERAL LOW BACK PAIN WITHOUT SCIATICA: ICD-10-CM

## 2024-08-15 DIAGNOSIS — R11.0 NAUSEA: ICD-10-CM

## 2024-08-15 DIAGNOSIS — M25.562 CHRONIC PAIN OF BOTH KNEES: ICD-10-CM

## 2024-08-15 DIAGNOSIS — Z85.01 PERSONAL HISTORY OF ESOPHAGEAL CANCER: ICD-10-CM

## 2024-08-15 DIAGNOSIS — M25.561 CHRONIC PAIN OF BOTH KNEES: ICD-10-CM

## 2024-08-15 DIAGNOSIS — K31.84 GASTROPARESIS: Primary | ICD-10-CM

## 2024-08-15 DIAGNOSIS — G89.29 CHRONIC PAIN OF BOTH KNEES: ICD-10-CM

## 2024-08-15 PROCEDURE — 3077F SYST BP >= 140 MM HG: CPT | Performed by: INTERNAL MEDICINE

## 2024-08-15 PROCEDURE — 3078F DIAST BP <80 MM HG: CPT | Performed by: INTERNAL MEDICINE

## 2024-08-15 PROCEDURE — 1160F RVW MEDS BY RX/DR IN RCRD: CPT | Performed by: INTERNAL MEDICINE

## 2024-08-15 PROCEDURE — 99214 OFFICE O/P EST MOD 30 MIN: CPT | Performed by: INTERNAL MEDICINE

## 2024-08-15 PROCEDURE — 1159F MED LIST DOCD IN RCRD: CPT | Performed by: INTERNAL MEDICINE

## 2024-08-15 RX ORDER — SODIUM CHLORIDE, SODIUM LACTATE, POTASSIUM CHLORIDE, CALCIUM CHLORIDE 600; 310; 30; 20 MG/100ML; MG/100ML; MG/100ML; MG/100ML
30 INJECTION, SOLUTION INTRAVENOUS CONTINUOUS
OUTPATIENT
Start: 2024-08-15

## 2024-08-15 NOTE — TELEPHONE ENCOUNTER
BETTINA RIZZO IN SCHEDULING PT SCHEDULED 12/12/2024 ARRIVING AT 9:15AM EGD PREP INFO HANDED TO [employer] FOR HUB TO RELAY

## 2024-08-15 NOTE — PROGRESS NOTES
Physical Therapy Daily Treatment Note    [facility]  [address]  [address]  [place] [address]     Patient: Jose Hurtado   : 1950  Referring practitioner: Brandin Bolton MD  Date of Initial Visit: Type: THERAPY  Noted: 2024  Today's Date: 8/15/2024  Patient seen for 20 sessions         Jose Hurtado reports: stretches help in regards to mobility after PT sessions.        Subjective     Objective   - ambulates in/out of clinic with tall walking stick.  Noted pt with altered trunk positioning from mid line(increased kyphosis), decreased step/stride length, increased knee flexion B and valgus positioning R greater L LE.    See Exercise, Manual, and Modality Logs for complete treatment.   Gait ambulation in clinic(one lap) working on patients postural awareness of upright trunk positioning.  -verbal/tactile cues throughout session regarding gait and posture.    Assessment/Plan  Gait improves with increased awareness and after flexibility/stretching exercise performance.  Needs continued strengthening with in clinic activities to improve safety with ambulation and ADL performance.        Progress per Plan of Care and Progress strengthening /stabilization /functional activity  Add hip strengthening exercises next session.           Timed:  Manual Therapy:         mins  36277;  Therapeutic Exercise:     25    mins  83570;     Neuromuscular Wil: 4       mins  46312;    Therapeutic Activity:    10      mins  41577;     Gait Trainin     mins  68756;     Ultrasound:          mins  37337;    Self Care                    ___      mins 43219    Untimed:  Electrical Stimulation:         mins  38221 ( );  Mechanical Traction:         mins  63620;     Timed Treatment:  43    mins   Total Treatment:   43     mins  Rey Sterling PTA  Physical Therapist  Assistant  [id number]

## 2024-08-15 NOTE — PROGRESS NOTES
Chief Complaint   Patient presents with    Nausea    Dry Heaving        Jose Hurtado is a  74 y.o. male here for a follow up visit for nausea, dry heaves, history of esophageal cancer, gastroparesis    HPI this 74-year-old male patient of Dr. Brandin Bolton presents in follow-up since he was last seen in August 2023.  He reports an episode earlier this year of severe retching that required hospitalization.  He states that they did not change any medical regimen or perform any endoscopic evaluation but his last upper endoscopy was performed in August 2023.  He had evidence of surgical changes consistent with a pull-through procedure because of previous esophagectomy.  Biopsies obtained at that time revealed rare fungal organisms with some active inflammation and rare intestinal metaplasia.  Despite return of his condition to his baseline I did offer upper endoscopic evaluation at this point given his history of esophageal cancer and Pardo's changes seen on his last examination.  He is amenable to this.  I tried to review old records regarding his initial gastric pull-through surgery but that data is not currently available.  Specifically, I was interested in whether he had undergone a pyloroplasty when his pull-through was performed as I suspect his performance related to gastric emptying was impaired after that procedure due to denervation.  It is also interesting that he has been treated with a prokinetic agent that should not have a dramatic effect on gastric emptying due to this denervation.    Past Medical History:   Diagnosis Date    Acute deep vein thrombosis (DVT) of popliteal vein of left lower extremity 03/24/2020    Anemia     Anti-phospholipid syndrome     On lifelong anticoagulation therapy    Bladder disorder     LEAKAGE   ON MED  wers pads    Bleeding hemorrhoids     Chronic kidney disease     Community acquired pneumonia     HISTORY OF IN 2014    Congestive heart failure     COPD (chronic obstructive  pulmonary disease)     Deep venous thrombosis 2006, 2008    Left lower extremity multiple    Depression     Esophageal carcinoma 12/31/2014    had chemo and radiation prior surgery    Gastroparesis     Hemorrhoids     HH (hiatus hernia)     History of atrial fibrillation 2015    ONE EPISODE WHILE HOSPITALIZED    History of kidney stones     History of nephrolithiasis     History of pancreatitis     PT STATES MANY YEARS AGO    History of radiation therapy     History of transfusion     Hypertension     Long-term (current) use of anticoagulants, INR goal 2.0-3.0     Lymphedema     Malignant neoplasm of prostate     Other hyperlipidemia 01/30/2018 January 30, 2018 lipid panel risk 12.8%    Restless legs syndrome     Sleep apnea     OCCASIONALLY WEARS CPAP    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      atorvastatin (LIPITOR) 40 MG tablet Take 1 tablet by mouth Every Night. 90 tablet 2    Cholecalciferol (Vitamin D3) 50 MCG (2000 UT) capsule Take 1 capsule by mouth Daily.      cyanocobalamin 1000 MCG/ML injection Inject 1 mL under the skin into the appropriate area as directed Every 30 (Thirty) Days. Indications: Inadequate Vitamin B12 4 mL 5    Eliquis 5 MG tablet tablet TAKE ONE TABLET BY MOUTH EVERY 12 HOURS 60 tablet 1    EPINEPHrine, Anaphylaxis, (ADRENALIN) 1 MG/ML injection Inject 0.3 mL into the appropriate muscle as directed by prescriber As Needed for Anaphylaxis. Indications: Life-Threatening Hypersensitivity Reaction      furosemide (LASIX) 20 MG tablet Take 1 tablet by mouth Daily.      gabapentin (Neurontin) 100 MG capsule Take 1 capsule by mouth 3 (Three) Times a Day. 90 capsule 2    metoclopramide (Reglan) 5 MG tablet Take 1 tablet by mouth 2 (Two) Times a Day. You will need to schedule an office apt for  "further refills 60 tablet 5    mupirocin (BACTROBAN) 2 % ointment Apply  topically to the appropriate area as directed.      ondansetron ODT (ZOFRAN-ODT) 4 MG disintegrating tablet Place 1 tablet on the tongue Every 8 (Eight) Hours As Needed for Nausea or Vomiting (for nausea and dry heaves). 45 tablet 0    PARoxetine (PAXIL) 40 MG tablet TAKE ONE TABLET BY MOUTH EVERY MORNING 30 tablet 5    polyethylene glycol (MIRALAX) 17 g packet Take 17 g by mouth Daily. (Patient taking differently: Take 17 g by mouth Daily.) 30 each 0    pramipexole (MIRAPEX) 1.5 MG tablet Take 1 tablet by mouth 2 (Two) Times a Day. Indications: Restless Leg Syndrome 180 tablet 1     No current facility-administered medications for this visit.       PRN Meds:.    Allergies   Allergen Reactions    Sulfamethoxazole-Trimethoprim Nausea Only     PT. REPORTS \"I DONT KNOW OF ANYTHING IM ALLERGIC TO\" This nurse did not remove it as it had been placed 2023       Social History     Socioeconomic History    Marital status:      Spouse name: Lena    Number of children: 0    Years of education: College   Tobacco Use    Smoking status: Former     Current packs/day: 0.00     Average packs/day: 2.0 packs/day for 3.0 years (6.0 ttl pk-yrs)     Types: Cigarettes     Start date:      Quit date:      Years since quittin.6     Passive exposure: Past (father smoked in the home)    Smokeless tobacco: Former     Types: Chew    Tobacco comments:     Caffeine - coffee and soda    Vaping Use    Vaping status: Never Used   Substance and Sexual Activity    Alcohol use: Yes     Alcohol/week: 3.0 standard drinks of alcohol     Types: 1 Glasses of wine, 1 Cans of beer, 1 Shots of liquor per week     Comment: 2    Drug use: Never     Comment: hx marijuana use \"a long time ago\"    Sexual activity: Defer     Partners: Male     Birth control/protection: Contraceptive sponge       Family History   Problem Relation Age of Onset    Cerebral aneurysm " Mother         cerebral artery aneurysm ( age 56)    Anxiety disorder Father     Suicide Attempts Father          of suicide    Cancer Father         bladder    Prostate cancer Brother 68    No Known Problems Brother     Pancreatic cancer Nephew     Colon cancer Neg Hx     Esophageal cancer Neg Hx     Dementia Neg Hx     Malig Hyperthermia Neg Hx        Review of Systems   Constitutional:  Negative for activity change, appetite change, fatigue and unexpected weight change.   HENT:  Negative for congestion, facial swelling, sore throat, trouble swallowing and voice change.    Eyes:  Negative for photophobia and visual disturbance.   Respiratory:  Negative for cough and choking.    Cardiovascular:  Negative for chest pain.   Gastrointestinal:  Positive for nausea. Negative for abdominal distention, abdominal pain, anal bleeding, blood in stool, constipation, diarrhea, rectal pain and vomiting.        GERD  Dry heaves   Endocrine: Negative for polyphagia.   Musculoskeletal:  Negative for arthralgias, gait problem and joint swelling.   Skin:  Negative for color change, pallor and rash.   Allergic/Immunologic: Negative for food allergies.   Neurological:  Negative for speech difficulty and headaches.   Hematological:  Does not bruise/bleed easily.   Psychiatric/Behavioral:  Negative for agitation, confusion and sleep disturbance.        Vitals:    08/15/24 1414   BP: 144/71   Pulse: 73       Physical Exam  Constitutional:       Appearance: He is well-developed.   HENT:      Head: Normocephalic.   Eyes:      Conjunctiva/sclera: Conjunctivae normal.   Cardiovascular:      Rate and Rhythm: Normal rate and regular rhythm.   Pulmonary:      Breath sounds: Normal breath sounds.   Abdominal:      General: Bowel sounds are normal.      Palpations: Abdomen is soft.   Musculoskeletal:         General: Normal range of motion.      Cervical back: Normal range of motion.      Comments: Uses a cane for ambulation   Skin:      General: Skin is warm and dry.   Neurological:      Mental Status: He is alert and oriented to person, place, and time.   Psychiatric:         Behavior: Behavior normal.         ASSESSMENT   Nausea  Gastroparesis  History of esophageal cancer with gastric pull-through  Dry heaves    PLAN  Schedule EGD  Continue current medical regimen      ICD-10-CM ICD-9-CM   1. Gastroparesis  K31.84 536.3   2. Nausea  R11.0 787.02   3. Personal history of esophageal cancer  Z85.01 V10.03

## 2024-08-20 ENCOUNTER — TELEPHONE (OUTPATIENT)
Age: 74
End: 2024-08-20

## 2024-08-20 NOTE — TELEPHONE ENCOUNTER
Caller: Jose Hurtado    Relationship: Self       What was the call regarding: NOT FEELING WELL

## 2024-08-23 ENCOUNTER — TREATMENT (OUTPATIENT)
Age: 74
End: 2024-08-23
Payer: MEDICARE

## 2024-08-23 DIAGNOSIS — R53.81 PHYSICAL DECONDITIONING: ICD-10-CM

## 2024-08-23 DIAGNOSIS — M25.562 CHRONIC PAIN OF BOTH KNEES: ICD-10-CM

## 2024-08-23 DIAGNOSIS — G89.29 CHRONIC BILATERAL LOW BACK PAIN WITHOUT SCIATICA: ICD-10-CM

## 2024-08-23 DIAGNOSIS — M25.561 CHRONIC PAIN OF BOTH KNEES: ICD-10-CM

## 2024-08-23 DIAGNOSIS — G89.29 CHRONIC PAIN OF BOTH KNEES: ICD-10-CM

## 2024-08-23 DIAGNOSIS — R26.89 DECREASED MOBILITY: ICD-10-CM

## 2024-08-23 DIAGNOSIS — R26.89 BALANCE PROBLEM: Primary | ICD-10-CM

## 2024-08-23 DIAGNOSIS — M54.50 CHRONIC BILATERAL LOW BACK PAIN WITHOUT SCIATICA: ICD-10-CM

## 2024-08-23 NOTE — PROGRESS NOTES
Physical Therapy Re-Assessment   [facility] [place]   [address] [place] [address]  P: [phone]       F: [phone]        Patient: Jose Hurtado   : 1950  Visit Diagnoses:     ICD-10-CM ICD-9-CM   1. Balance problem  R26.89 781.99   2. Decreased mobility  R26.89 781.99   3. Physical deconditioning  R53.81 799.3   4. Chronic pain of both knees  M25.561 719.46    M25.562 338.29    G89.29    5. Chronic bilateral low back pain without sciatica  M54.50 724.2    G89.29 338.29     Referring practitioner: Brandin Bolton MD  Date of Initial Visit: Type: THERAPY  Noted: 2024  Today's Date: 2024  Patient seen for 21 sessions      Subjective:   Jose Hurtado reports:   Subjective Questionnaire: ABC: 47%  Clinical Progress: improved  Home Program Compliance: Yes  Treatment has included: therapeutic exercise, neuromuscular re-education, manual therapy, and therapeutic activity    Subjective   Patient reports no new falls this past week.  States overall he is feeling stronger from his recent decline after the fall a couple weeks ago.     Objective     Tenderness   No TTP of knees     Active Range of Motion   Left Hip   Extension: 10 (LAG) degrees      Right Hip   Extension: 4 (LAG) degrees     Left Knee   Extensor lag: 15 degrees      Right Knee   Extensor la degrees      Lumbar Spine  Lumbar ROM is grossly WFL except extension.     Strength/Myotome Testing      Left Hip   Planes of Motion   Flexion: 4+  Extension: 4  Abduction: 4+  External rotation: 5  Internal rotation: 5     Right Hip   Planes of Motion   Flexion: 4+  Extension: 4  Abduction: 4+  External rotation:5  Internal rotation: 5     Left Knee   Flexion: 5  Extension: 5     Right Knee   Flexion: 5  Extension: 5     Ambulation      Observational Gait   Gait: Asymmetrical   Decreased walking speed  Increased NOEL  Abducted arms    Q angle: Left 20 degrees, Right 4 degrees     Quality of Movement During  Gait   Trunk  Forward lean.      Functional Assessment      Comments  5XSTS (standard chair): 14.47 seconds (used both hand to push up from chair)     TUG (standard chair): 12.11 seconds (used left hand to push up from chair, no AD)           See Exercise, Manual, and Modality Logs for complete treatment.     Assessment/Plan  Patient demonstrates improved hip extension bilaterally.  His knee extension has not changed from last assessment.  New observation this assessment increased Q angle on left knee measured at 20 degrees compared to right at 4 degrees.  This is likely contributing to his difficulty improving knee extension.  He was able to advance his functional performance test to starting from a standard chair versus an elevated table.  His times were mildly longer than last assessment however he had the advantage of being on elevated surface.  He will benefit from continued PT to increase hip and knee extension to reduce joint contractures and improve safety and balance with functional mobility and gait.  His overall progress has been limited during this last assessment.  Due to a fall with a concussion and other comorbidities involving GI system.  Progress toward previous goals: Partially Met    Goal Review  Short Term Goals (2 wks):  1.  Patient will have increased lumbar spine ROM to WFL.-partially met  2.  Patient will have increased bilateral hip extension to 0 degrees.-progressing  3.  Patient will have increased bilateral knee extension to 0 degrees.-progressing  4.  Patient will normalized tone in bilateral iliopsoas.-met     Long Term Goals (4 wks):  1.  Patient will be independent in performance of HEP.-progressing  2.  Patient will have improved ABC score of 60% or better.-progressing  3.  Patient will have improved 5XSTS test time of 30  seconds or less.-met  4.  Patient will have improved TUG test time 18 seconds or less.-met      Recommendations: Continue as planned  Timeframe: 1 month  Prognosis  to achieve goals: good    PT SIGNATURE: Mervat Ramachandran PT     License Number: [id number]  Electronically signed by Mervat Ramachandran PT, 08/23/24, 10:26 AM EDT          Timed:         Manual Therapy:         mins  55257;     Therapeutic Exercise:    30     mins  53225;     Neuromuscular Wil:        mins  17938;    Therapeutic Activity:     15     mins  78713;     Gait Training:           mins  97540;     Ultrasound:          mins  43848;    Ionto                                  mins  24508  Self Care                            mins  67558  Canalith Repos         mins  46507  Orthotic MGMT/Train         mins  19825    Un-Timed:  Electrical Stimulation:         mins  39931 ( );  Dry Needling:          mins  55634 self-pay;  Dry Needling:          mins  77161 self-pay  Traction          mins  18125  Low Eval          mins  01670  Mod Eval          mins  15779  High Eval                            mins  44154    Timed Treatment:   45   mins   Total Treatment:     45   mins

## 2024-08-29 ENCOUNTER — TREATMENT (OUTPATIENT)
Age: 74
End: 2024-08-29
Payer: MEDICARE

## 2024-08-29 DIAGNOSIS — G89.29 CHRONIC BILATERAL LOW BACK PAIN WITHOUT SCIATICA: ICD-10-CM

## 2024-08-29 DIAGNOSIS — M25.561 CHRONIC PAIN OF BOTH KNEES: ICD-10-CM

## 2024-08-29 DIAGNOSIS — R53.81 PHYSICAL DECONDITIONING: ICD-10-CM

## 2024-08-29 DIAGNOSIS — M54.50 CHRONIC BILATERAL LOW BACK PAIN WITHOUT SCIATICA: ICD-10-CM

## 2024-08-29 DIAGNOSIS — G89.29 CHRONIC PAIN OF BOTH KNEES: ICD-10-CM

## 2024-08-29 DIAGNOSIS — R26.89 BALANCE PROBLEM: Primary | ICD-10-CM

## 2024-08-29 DIAGNOSIS — R26.89 DECREASED MOBILITY: ICD-10-CM

## 2024-08-29 DIAGNOSIS — M25.562 CHRONIC PAIN OF BOTH KNEES: ICD-10-CM

## 2024-08-29 NOTE — PROGRESS NOTES
Physical Therapy Treatment Note  [facility] [place]   [address] [place] [address]  P: [phone]       F: [phone]      Patient: Jose Hurtado   : 1950  Treatment Diagnosis:     ICD-10-CM ICD-9-CM   1. Balance problem  R26.89 781.99   2. Decreased mobility  R26.89 781.99   3. Physical deconditioning  R53.81 799.3   4. Chronic pain of both knees  M25.561 719.46    M25.562 338.29    G89.29    5. Chronic bilateral low back pain without sciatica  M54.50 724.2    G89.29 338.29     Referring practitioner: Brandin Bolton MD  Date of Initial Visit: Type: THERAPY  Noted: 2024  Today's Date: 2024  Patient seen for 22 sessions           Subjective   Patient reports his legs have been feeling okay.  States he feels tired today.    Objective     Vitals:  BP: 154/62 mmHg  HR: 72-84 bpm  Respiration Rate: 30 breaths/min    See Exercise, Manual, and Modality Logs for complete treatment.       Assessment/Plan  Initiated exercise session with NuStep.  Approximately 7 minutes in on his time on the NuStep he reported feeling really tired and demonstrated increased respiration rate.  His vitals were assessed, after a monitored rest period his heart rate and respirations returned to baseline resting levels.  Resumed exercise with modified program.  He tolerated additional exercises well without a reoccurrence of symptoms of fatigue, elevated heart rate or elevated respiration rate.  Patient was advised to consult his physician if the symptoms return.  Progress per Plan of Care           Timed:         Manual Therapy:         mins  93798;     Therapeutic Exercise:    30     mins  06010;     Neuromuscular Wil:        mins  22581;    Therapeutic Activity:     15     mins  43814;     Gait Training:           mins  15266;     Ultrasound:          mins  75295;    Ionto                                  mins  04404  Self Care                            mins  19115  Andra  Repos         mins  12584  Orthotic MGMT/Train         mins  97574    Un-Timed:  Electrical Stimulation:         mins  11352 ( );  Dry Needling:          mins  87483 self-pay;  Dry Needling:          mins  20561 self-pay  Traction          mins  42277      Timed Treatment:   45   mins   Total Treatment:     45   mins        PT SIGNATURE: Mervat Ramachandran PT     License Number: [id number]  Electronically signed by Mervat Ramachandran PT, 08/29/24, 10:59 AM EDT

## 2024-09-03 ENCOUNTER — TREATMENT (OUTPATIENT)
Age: 74
End: 2024-09-03
Payer: MEDICARE

## 2024-09-03 DIAGNOSIS — G89.29 CHRONIC BILATERAL LOW BACK PAIN WITHOUT SCIATICA: ICD-10-CM

## 2024-09-03 DIAGNOSIS — R26.89 DECREASED MOBILITY: ICD-10-CM

## 2024-09-03 DIAGNOSIS — R26.89 BALANCE PROBLEM: Primary | ICD-10-CM

## 2024-09-03 DIAGNOSIS — G89.29 CHRONIC PAIN OF BOTH KNEES: ICD-10-CM

## 2024-09-03 DIAGNOSIS — M25.562 CHRONIC PAIN OF BOTH KNEES: ICD-10-CM

## 2024-09-03 DIAGNOSIS — R53.81 PHYSICAL DECONDITIONING: ICD-10-CM

## 2024-09-03 DIAGNOSIS — M54.50 CHRONIC BILATERAL LOW BACK PAIN WITHOUT SCIATICA: ICD-10-CM

## 2024-09-03 DIAGNOSIS — M25.561 CHRONIC PAIN OF BOTH KNEES: ICD-10-CM

## 2024-09-03 PROCEDURE — 97110 THERAPEUTIC EXERCISES: CPT | Performed by: PHYSICAL THERAPIST

## 2024-09-03 PROCEDURE — 97112 NEUROMUSCULAR REEDUCATION: CPT | Performed by: PHYSICAL THERAPIST

## 2024-09-03 PROCEDURE — 97530 THERAPEUTIC ACTIVITIES: CPT | Performed by: PHYSICAL THERAPIST

## 2024-09-03 NOTE — PROGRESS NOTES
Physical Therapy Treatment Note  [facility] [place]   [address] [place] [address]  P: [phone]       F: [phone]      Patient: Jose Hurtado   : 1950  Treatment Diagnosis:     ICD-10-CM ICD-9-CM   1. Balance problem  R26.89 781.99   2. Decreased mobility  R26.89 781.99   3. Physical deconditioning  R53.81 799.3   4. Chronic pain of both knees  M25.561 719.46    M25.562 338.29    G89.29    5. Chronic bilateral low back pain without sciatica  M54.50 724.2    G89.29 338.29     Referring practitioner: Brandin Bolton MD  Date of Initial Visit: Type: THERAPY  Noted: 2024  Today's Date: 9/3/2024  Patient seen for 23 sessions           Subjective   Patient reports he tried walking on the TM for the first time in over a year this past weekend.  States his legs were very tired after.      Objective     See Exercise, Manual, and Modality Logs for complete treatment.       Assessment/Plan  Patient performed program with progressed exercise parameters.  Benefits from cueing for technique and to prevent compensatory patterns.  He did require a few rest breaks due to LE muscle fatigue.  Will continue to progress strengthening and hip/knee extension stretching.  Progress per Plan of Care           Timed:         Manual Therapy:         mins  63222;     Therapeutic Exercise:    40     mins  04885;     Neuromuscular Wil:    8    mins  98549;    Therapeutic Activity:     10     mins  91606;     Gait Training:           mins  36574;     Ultrasound:          mins  66531;    Ionto                                  mins  42371  Self Care                            mins  87520  Canalith Repos         mins  26554  Orthotic MGMT/Train         mins  76514    Un-Timed:  Electrical Stimulation:         mins  42731 (MC );  Dry Needling:          mins  96911 self-pay;  Dry Needling:          mins  77273 self-pay  Traction          mins  73263      Timed Treatment:   58   mins    Total Treatment:     65   mins        PT SIGNATURE: Mervat Ramachandran PT     License Number: [id number]  Electronically signed by Mervat Ramachandran PT, 09/03/24, 10:03 AM EDT

## 2024-09-05 ENCOUNTER — TREATMENT (OUTPATIENT)
Age: 74
End: 2024-09-05
Payer: MEDICARE

## 2024-09-05 DIAGNOSIS — M25.562 CHRONIC PAIN OF BOTH KNEES: ICD-10-CM

## 2024-09-05 DIAGNOSIS — R26.89 DECREASED MOBILITY: ICD-10-CM

## 2024-09-05 DIAGNOSIS — M25.561 CHRONIC PAIN OF BOTH KNEES: ICD-10-CM

## 2024-09-05 DIAGNOSIS — R53.81 PHYSICAL DECONDITIONING: ICD-10-CM

## 2024-09-05 DIAGNOSIS — G89.29 CHRONIC BILATERAL LOW BACK PAIN WITHOUT SCIATICA: ICD-10-CM

## 2024-09-05 DIAGNOSIS — M54.50 CHRONIC BILATERAL LOW BACK PAIN WITHOUT SCIATICA: ICD-10-CM

## 2024-09-05 DIAGNOSIS — R26.89 BALANCE PROBLEM: Primary | ICD-10-CM

## 2024-09-05 DIAGNOSIS — G89.29 CHRONIC PAIN OF BOTH KNEES: ICD-10-CM

## 2024-09-05 NOTE — PROGRESS NOTES
Physical Therapy Treatment Note  [facility] [place]   [address] [place] [address]  P: [phone]       F: [phone]      Patient: Jose Hurtado   : 1950  Treatment Diagnosis:     ICD-10-CM ICD-9-CM   1. Balance problem  R26.89 781.99   2. Decreased mobility  R26.89 781.99   3. Physical deconditioning  R53.81 799.3   4. Chronic pain of both knees  M25.561 719.46    M25.562 338.29    G89.29    5. Chronic bilateral low back pain without sciatica  M54.50 724.2    G89.29 338.29     Referring practitioner: Brandin Bolton MD  Date of Initial Visit: Type: THERAPY  Noted: 2024  Today's Date: 2024  Patient seen for 24 sessions           Subjective   Patient reports no falls since last visit.  States his legs feel stronger but his endurance still feels slow.    Objective     See Exercise, Manual, and Modality Logs for complete treatment.       Assessment/Plan  Patient performed program with progressed exercise parameters and advanced exercises for strengthening dynamic stabilization.  He tolerated progression well without any adverse symptoms however he did experience some fatigue which resolved with brief rest breaks.  Will continue to progress strengthening and range of motion to reduce hip and knee contractures.  Progress per Plan of Care and Progress strengthening /stabilization /functional activity           Timed:         Manual Therapy:         mins  22285;     Therapeutic Exercise:    30     mins  53368 (10 min concurrent);     Neuromuscular Wil:    10    mins  52003;    Therapeutic Activity:          mins  63359;     Gait Training:           mins  18961;     Ultrasound:          mins  96445;    Ionto                                  mins  12065  Self Care                            mins  59783  Canalith Repos         mins  79742  Orthotic MGMT/Train         mins  37565    Un-Timed:  Electrical Stimulation:         mins  84848 ( );  Dry  Needling:          mins  68654 self-pay;  Dry Needling:          mins  42892 self-pay  Traction          mins  93197      Timed Treatment:   40   mins   Total Treatment:     50   mins        PT SIGNATURE: Mervat Ramachandran PT     License Number: [id number]  Electronically signed by Mervat Ramachandran PT, 09/05/24, 10:13 AM EDT

## 2024-09-09 ENCOUNTER — TREATMENT (OUTPATIENT)
Age: 74
End: 2024-09-09
Payer: MEDICARE

## 2024-09-09 DIAGNOSIS — M25.561 CHRONIC PAIN OF BOTH KNEES: ICD-10-CM

## 2024-09-09 DIAGNOSIS — G89.29 CHRONIC BILATERAL LOW BACK PAIN WITHOUT SCIATICA: ICD-10-CM

## 2024-09-09 DIAGNOSIS — M54.50 CHRONIC BILATERAL LOW BACK PAIN WITHOUT SCIATICA: ICD-10-CM

## 2024-09-09 DIAGNOSIS — M25.562 CHRONIC PAIN OF BOTH KNEES: ICD-10-CM

## 2024-09-09 DIAGNOSIS — G89.29 CHRONIC PAIN OF BOTH KNEES: ICD-10-CM

## 2024-09-09 DIAGNOSIS — R26.89 DECREASED MOBILITY: ICD-10-CM

## 2024-09-09 DIAGNOSIS — R53.81 PHYSICAL DECONDITIONING: ICD-10-CM

## 2024-09-09 DIAGNOSIS — R26.89 BALANCE PROBLEM: Primary | ICD-10-CM

## 2024-09-09 PROCEDURE — 97110 THERAPEUTIC EXERCISES: CPT | Performed by: PHYSICAL THERAPIST

## 2024-09-09 PROCEDURE — 97530 THERAPEUTIC ACTIVITIES: CPT | Performed by: PHYSICAL THERAPIST

## 2024-09-09 NOTE — PROGRESS NOTES
PHYSICAL THERAPY DISCHARGE SUMMARY  [facility] [place]   [address] [place] [address]  P: [phone]       F: [phone]     Patient: Jose Hurtado   : 1950  Diagnosis/ICD-10 Code:  Balance problem [R26.89]  Referring practitioner: Brandin Bolton MD  Date of Initial Visit: Type: THERAPY  Noted: 2024  Today's Date: 2024  Patient seen for 25 sessions      Subjective:   Jose Hurtado reports:   Subjective Questionnaire: ABC: 67%   Clinical Progress: improved  Home Program Compliance: Yes  Treatment has included: therapeutic exercise, neuromuscular re-education, manual therapy, and therapeutic activity    Subjective  Patient reports he has been feeling stronger.  States he has not been able to go back to the gym the last few days and feels confident with his home exercise program.  States he feels he is ready to try managing his exercise routine on his own again.  States he feels competent with his balance walking as well.    Objective        Tenderness   No TTP of knees     Active Range of Motion   Left Hip   Extension: 0 degrees      Right Hip   Extension: 0 degrees     Left Knee   Extensor lag: 10 degrees      Right Knee   Extensor la degrees      Lumbar Spine  Lumbar ROM is grossly WFL (able to correct hyper flexed posture, default posture is still flexed).     Strength/Myotome Testing      Left Hip   Planes of Motion   Flexion: 4+  Extension: 4+  Abduction: 4+  External rotation: 5  Internal rotation: 5     Right Hip   Planes of Motion   Flexion: 4+  Extension: 4+  Abduction: 4+  External rotation:5  Internal rotation: 5     Left Knee   Flexion: 5  Extension: 5     Right Knee   Flexion: 5  Extension: 5     Ambulation      Observational Gait   Gait: Asymmetrical   Functional walking speed  Increased NOEL  Mildly abducted arms     Q angle: Left 20 degrees, Right 4 degrees     Quality of Movement During Gait   Trunk  Forward lean.      Functional  Assessment      Comments  5XSTS (standard chair): 12.32 seconds (used both hand to push up from chair)     TUG (standard chair): 11.21 seconds (used left hand to push up from chair, no AD)        Assessment/Plan  Patient demonstrates improved range of motion in bilateral hip and knee as well as increased bilateral lower extremity strength.  He continues to have some mild knee extension limitations left more than right.  Functional performance test for sit to stand and timed up and go times have improved.  He is independent in his HEP.  Encouraged to maintain consistent performance of HEP to prevent regression of hip and knee range of motion and ideally make continued gains in knee extension bilaterally.  Progress toward previous goals: All Met except for knee extension.    Goal Review  Short Term Goals:  1.  Patient will have increased lumbar spine ROM to WFL.- met  2.  Patient will have increased bilateral hip extension to 0 degrees.-Met  3.  Patient will have increased bilateral knee extension to 0 degrees.-Improved  4.  Patient will normalized tone in bilateral iliopsoas.-met     Long Term Goals:  1.  Patient will be independent in performance of HEP.-Met  2.  Patient will have improved ABC score of 60% or better.-Met  3.  Patient will have improved 5XSTS test time of 30  seconds or less.-met  4.  Patient will have improved TUG test time 18 seconds or less.-met      Recommendations: Discharge to [facility]    PT SIGNATURE: Mervat Ramachandran PT     License Number: [id number]  Electronically signed by Mervat Ramachandran PT, 09/09/24, 9:51 AM EDT    Timed:         Manual Therapy:         mins  03207;     Therapeutic Exercise:    30     mins  62432;     Neuromuscular Wil:        mins  43172;    Therapeutic Activity:     15     mins  09046;     Gait Training:           mins  55230;     Ultrasound:          mins  95682;    Ionto                                  mins  53459  Self Care                            mins  25612  Andra  Repos         mins  35549  Orthotic MGMT/Train         mins  55338    Un-Timed:  Electrical Stimulation:         mins  87102 ( );  Dry Needling:          mins  35833 self-pay;  Dry Needling:          mins  19215 self-pay  Traction          mins  50703  Low Eval          mins  55698  Mod Eval          mins  95814  High Eval                            mins  35896    Timed Treatment:   45   mins   Total Treatment:     45   mins

## 2024-11-06 ENCOUNTER — OFFICE VISIT (OUTPATIENT)
Dept: INTERNAL MEDICINE | Age: 74
End: 2024-11-06
Payer: MEDICARE

## 2024-11-06 VITALS
HEART RATE: 99 BPM | DIASTOLIC BLOOD PRESSURE: 82 MMHG | HEIGHT: 77 IN | OXYGEN SATURATION: 96 % | BODY MASS INDEX: 18.42 KG/M2 | SYSTOLIC BLOOD PRESSURE: 130 MMHG | TEMPERATURE: 97.1 F | WEIGHT: 156 LBS

## 2024-11-06 DIAGNOSIS — J44.1 CHRONIC OBSTRUCTIVE PULMONARY DISEASE WITH ACUTE EXACERBATION: Primary | Chronic | ICD-10-CM

## 2024-11-06 DIAGNOSIS — F33.41 RECURRENT MAJOR DEPRESSIVE DISORDER, IN PARTIAL REMISSION: Chronic | ICD-10-CM

## 2024-11-06 DIAGNOSIS — R63.0 POOR APPETITE: ICD-10-CM

## 2024-11-06 RX ORDER — BUDESONIDE, GLYCOPYRROLATE, AND FORMOTEROL FUMARATE 160; 9; 4.8 UG/1; UG/1; UG/1
2 AEROSOL, METERED RESPIRATORY (INHALATION) 2 TIMES DAILY
Qty: 10.7 G | Refills: 5 | Status: SHIPPED | OUTPATIENT
Start: 2024-11-06

## 2024-11-06 NOTE — PROGRESS NOTES
"                            J  U  N  O  H    K  I  M ,   M  D                  I  N  T  E  R  N  A  L    M  E  D  I  C  I  N  E         ENCOUNTER DATE:  11/06/2024    Jose FITCH Hurtado / 74 y.o. / male    OFFICE VISIT ENCOUNTER       CHIEF COMPLAINT / REASON FOR OFFICE VISIT     Recurrent major depressive disorder, in partial remission and ER f/u- Acute cystitis with hematuria  (11/2/24)      ASSESSMENT & PLAN     Problem List Items Addressed This Visit          High    Chronic obstructive pulmonary disease - Primary (Chronic)    Current Assessment & Plan     Has established with pulmonologist but not on any inhaler.   Start Breztri 2 puffs BID.   Take Mucinex DM BID PRN for chest congestion/cough.          Relevant Medications    Budeson-Glycopyrrol-Formoterol (Breztri Aerosphere) 160-9-4.8 MCG/ACT aerosol inhaler       Medium    Recurrent major depressive disorder, in partial remission (Chronic)    Current Assessment & Plan     Continue paroxetine 40 mg daily.          Relevant Medications    PARoxetine (PAXIL) 40 MG tablet       Unprioritized    Poor appetite     Orders Placed This Encounter   Procedures    Pneumococcal Conjugate Vaccine 20-Valent All     New Medications Ordered This Visit   Medications    Budeson-Glycopyrrol-Formoterol (Breztri Aerosphere) 160-9-4.8 MCG/ACT aerosol inhaler     Sig: Inhale 2 puffs 2 (Two) Times a Day.     Dispense:  10.7 g     Refill:  5       SUMMARY/DISCUSSION  Recommended Boost/Ensure 2-3 times daily to supplement meals.       TOTAL TIME OF ENCOUNTER:        Next Appointment with me: 3/7/2025    Return in about 4 months (around 3/6/2025) for Reassess chronic medical problems.      VITAL SIGNS     Vitals:    11/06/24 1054   BP: 130/82   Pulse: 99   Temp: 97.1 °F (36.2 °C)   SpO2: 96%   Weight: 70.8 kg (156 lb)   Height: 195.6 cm (77.01\")       BP Readings from Last 3 Encounters:   11/06/24 130/82   08/15/24 144/71   08/05/24 110/64     Wt Readings from Last 3 Encounters: "   11/06/24 70.8 kg (156 lb)   08/15/24 79.9 kg (176 lb 3.2 oz)   08/05/24 76.7 kg (169 lb)     Body mass index is 18.49 kg/m².    Blood pressure readings recorded on patient flowsheet:       No data to display                  MEDICATIONS AT THE TIME OF OFFICE VISIT     Current Outpatient Medications on File Prior to Visit   Medication Sig    acetaminophen (TYLENOL) 325 MG tablet Take 2 tablets by mouth Every 6 (Six) Hours As Needed for Mild Pain, Headache or Fever.    atorvastatin (LIPITOR) 40 MG tablet Take 1 tablet by mouth Every Night.    Cholecalciferol (Vitamin D3) 50 MCG (2000 UT) capsule Take 1 capsule by mouth Daily.    cyanocobalamin 1000 MCG/ML injection Inject 1 mL under the skin into the appropriate area as directed Every 30 (Thirty) Days. Indications: Inadequate Vitamin B12    Eliquis 5 MG tablet tablet TAKE ONE TABLET BY MOUTH EVERY 12 HOURS    EPINEPHrine, Anaphylaxis, (ADRENALIN) 1 MG/ML injection Inject 0.3 mL into the appropriate muscle as directed by prescriber As Needed for Anaphylaxis. Indications: Life-Threatening Hypersensitivity Reaction    gabapentin (Neurontin) 100 MG capsule Take 1 capsule by mouth 3 (Three) Times a Day.    metoclopramide (Reglan) 5 MG tablet Take 1 tablet by mouth 2 (Two) Times a Day. You will need to schedule an office apt for further refills    mupirocin (BACTROBAN) 2 % ointment Apply  topically to the appropriate area as directed.    ondansetron ODT (ZOFRAN-ODT) 4 MG disintegrating tablet Place 1 tablet on the tongue Every 8 (Eight) Hours As Needed for Nausea or Vomiting (for nausea and dry heaves).    PARoxetine (PAXIL) 40 MG tablet TAKE ONE TABLET BY MOUTH EVERY MORNING    polyethylene glycol (MIRALAX) 17 g packet Take 17 g by mouth Daily. (Patient taking differently: Take 17 g by mouth Daily.)    pramipexole (MIRAPEX) 1.5 MG tablet Take 1 tablet by mouth 2 (Two) Times a Day. Indications: Restless Leg Syndrome    furosemide (LASIX) 20 MG tablet Take 1 tablet by  "mouth Daily. (Patient not taking: Reported on 11/6/2024)     No current facility-administered medications on file prior to visit.          HISTORY OF PRESENT ILLNESS     Complains of sinus congestion/PND/chest congestion and cough with ongoing dyspnea which is worse with physical activity.   No fever, chills, chest pain or palpitations. Has seen pulmonologist but is not taking any maintenance inhaler for COPD. Depression is overall better back on paroxetine. Complains of poor appetite and inquires about a \"feeding tube\". Denies dysphagia or aspiration issues. Was see in ED for acute cystitis and treated with Zosyn and has one last dose of fosfomycin. Denies worsening UTI symptoms (self caths).     Patient Care Team:  Brandin Bolton MD as PCP - General (Internal Medicine)  Tapan, George THORPE II, MD as Consulting Physician (Hematology and Oncology)  Jose Inman MD as Consulting Physician (Urology)  Mulugeta Millan MD as Consulting Physician (Gastroenterology)  Jose Christine III, MD as Referring Physician (Thoracic Surgery)  Seth Randhwaa MD as Consulting Physician (Ophthalmology)  James Cyr MD as Consulting Physician (Cardiology)  Stephon Sommers MD as Consulting Physician (Sleep Medicine)  Rolanda Solomon MD as Consulting Physician (Nephrology)    REVIEW OF SYSTEMS     Review of Systems       PHYSICAL EXAMINATION     Physical Exam  Alert with normal thought and judgment.   Appears somewhat chronically ill   Mood overall stable   Cardiovascular: Normal rate, regular rhythm.   Lungs: decreased breath sounds bilaterally throughout lung fields without crackles, rales, wheezing       REVIEWED DATA     Labs:     Lab Results   Component Value Date     06/20/2024    K 4.5 06/20/2024    CALCIUM 8.4 (L) 06/20/2024    AST 19 11/14/2023    ALT 15 11/14/2023    BUN 22 06/20/2024    CREATININE 1.29 (H) 06/20/2024    CREATININE 1.05 11/17/2023    CREATININE 1.15 11/16/2023    EGFRRESULT 57.5 (L) " "06/20/2024     Lab Results   Component Value Date    HGBA1C 5.50 08/06/2021    HGBA1C 6.20 (H) 01/17/2020     Lab Results   Component Value Date    LDL 47 06/23/2023    LDL 52 05/12/2023    LDL 98 06/02/2022    HDL 62 (H) 06/23/2023    HDL 52 05/12/2023    TRIG 36 06/23/2023    TRIG 49 05/12/2023     Lab Results   Component Value Date    TSH 1.600 05/12/2023    TSH 2.710 01/17/2020    TSH 2.050 06/06/2019    FREET4 1.36 05/12/2023    FREET4 1.52 01/17/2020    FREET4 1.18 06/06/2019     Lab Results   Component Value Date    WBC 5.02 08/02/2024    HGB 11.6 (L) 08/02/2024     08/02/2024   No results found for: \"MALBCRERATIO\"        Imaging:     CT HEAD WO CONTRAST (07/28/2024 20:32)           Medical Tests:               Summary of old records / correspondence / consultant report:     PULMONARY - SCAN - F/U DYSPNEA, LPC, 05/21/2024 (05/21/2024)         Request outside records:       "

## 2024-11-06 NOTE — ASSESSMENT & PLAN NOTE
Has established with pulmonologist but not on any inhaler.   Start Breztri 2 puffs BID.   Take Mucinex DM BID PRN for chest congestion/cough.

## 2024-11-06 NOTE — LETTER
[facility]  Vaccine Consent Form    Patient Name:  Jose Hurtado  Patient :  1950     Vaccine(s) Ordered    Pneumococcal Conjugate Vaccine 20-Valent All        Screening Checklist  The following questions should be completed prior to vaccination. If you answer “yes” to any question, it does not necessarily mean you should not be vaccinated. It just means we may need to clarify or ask more questions. If a question is unclear, please ask your healthcare provider to explain it.    Yes No   Any fever or moderate to severe illness today (mild illness and/or antibiotic treatment are not contraindications)?     Do you have a history of a serious reaction to any previous vaccinations, such as anaphylaxis, encephalopathy within 7 days, Guillain-Ballico syndrome within 6 weeks, seizure?     Have you received any live vaccine(s) (e.g MMR, SAWYER) or any other vaccines in the last month (to ensure duplicate doses aren't given)?     Do you have an anaphylactic allergy to latex (DTaP, DTaP-IPV, Hep A, Hep B, MenB, RV, Td, Tdap), baker’s yeast (Hep B, HPV), polysorbates (RSV, nirsevimab, PCV 20, Rotavirrus, Tdap, Shingrix), or gelatin (SAWYER, MMR)?     Do you have an anaphylactic allergy to neomycin (Rabies, SAWYER, MMR, IPV, Hep A), polymyxin B (IPV), or streptomycin (IPV)?      Any cancer, leukemia, AIDS, or other immune system disorder? (SAWYER, MMR, RV)     Do you have a parent, brother, or sister with an immune system problem (if immune competence of vaccine recipient clinically verified, can proceed)? (MMR, SAWYER)     Any recent steroid treatments for >2 weeks, chemotherapy, or radiation treatment? (SAWYER, MMR)     Have you received antibody-containing blood transfusions or IVIG in the past 11 months (recommended interval is dependent on product)? (MMR, SAWYER)     Have you taken antiviral drugs (acyclovir, famciclovir, valacyclovir for SAWYER) in the last 24 or 48 hours, respectively?      Are you pregnant or planning to become  "pregnant within 1 month? (SAWYER, MMR, HPV, IPV, MenB, Abrexvy; For Hep B- refer to Engerix-B; For RSV - Abrysvo is indicated for 32-36 weeks of pregnancy from September to January)     For infants, have you ever been told your child has had intussusception or a medical emergency involving obstruction of the intestine (Rotavirus)? If not for an infant, can skip this question.         *Ordering Physicians/APC should be consulted if \"yes\" is checked by the patient or guardian above.  I have received, read, and understand the Vaccine Information Statement (VIS) for each vaccine ordered.  I have considered my or my child's health status as well as the health status of my close contacts.  I have taken the opportunity to discuss my vaccine questions with my or my child's health care provider.   I have requested that the ordered vaccine(s) be given to me or my child.  I understand the benefits and risks of the vaccines.  I understand that I should remain in the clinic for 15 minutes after receiving the vaccine(s).  _________________________________________________________  Signature of Patient or Parent/Legal Guardian ____________________  Date     "

## 2024-11-06 NOTE — TELEPHONE ENCOUNTER
Call from pt.  Advise per path report that polyp that was removed was not cancerous, but precancerous.  Remaining bx benign.    Advise per DR Millan that recommend f/u c/s in 3-5 yrs.  If bowel pattern changes persist, can f/u in office to discuss w/u of small bowel    Pt verb understanding - no current need.     Sheath was inserted in the right radial artery.

## 2024-12-05 ENCOUNTER — TELEPHONE (OUTPATIENT)
Dept: GASTROENTEROLOGY | Facility: CLINIC | Age: 74
End: 2024-12-05
Payer: MEDICARE

## 2024-12-05 NOTE — TELEPHONE ENCOUNTER
Dr Phelan,  Dr Millan has this pt scheduled for an EGD on 12/12/24 for his hx of gastroparesis, nausea and esophageal CA.  Dr Millan would like to know if this pt can hold their Eliquis for 48 hours prior?  Thanks  Rachel ALAN

## 2024-12-06 ENCOUNTER — LAB (OUTPATIENT)
Dept: OTHER | Facility: HOSPITAL | Age: 74
End: 2024-12-06
Payer: MEDICARE

## 2024-12-06 ENCOUNTER — OFFICE VISIT (OUTPATIENT)
Dept: ONCOLOGY | Facility: CLINIC | Age: 74
End: 2024-12-06
Payer: MEDICARE

## 2024-12-06 VITALS
RESPIRATION RATE: 16 BRPM | HEART RATE: 88 BPM | DIASTOLIC BLOOD PRESSURE: 64 MMHG | TEMPERATURE: 97.3 F | SYSTOLIC BLOOD PRESSURE: 106 MMHG | OXYGEN SATURATION: 98 % | WEIGHT: 174 LBS | HEIGHT: 77 IN | BODY MASS INDEX: 20.55 KG/M2

## 2024-12-06 DIAGNOSIS — D50.9 IRON DEFICIENCY ANEMIA, UNSPECIFIED IRON DEFICIENCY ANEMIA TYPE: Primary | ICD-10-CM

## 2024-12-06 DIAGNOSIS — D50.9 IRON DEFICIENCY ANEMIA, UNSPECIFIED IRON DEFICIENCY ANEMIA TYPE: ICD-10-CM

## 2024-12-06 LAB
BASOPHILS # BLD AUTO: 0.03 10*3/MM3 (ref 0–0.2)
BASOPHILS NFR BLD AUTO: 0.5 % (ref 0–1.5)
DEPRECATED RDW RBC AUTO: 53.8 FL (ref 37–54)
EOSINOPHIL # BLD AUTO: 0.19 10*3/MM3 (ref 0–0.4)
EOSINOPHIL NFR BLD AUTO: 3.2 % (ref 0.3–6.2)
ERYTHROCYTE [DISTWIDTH] IN BLOOD BY AUTOMATED COUNT: 15.6 % (ref 12.3–15.4)
FERRITIN SERPL-MCNC: 578.2 NG/ML (ref 30–400)
HCT VFR BLD AUTO: 35.3 % (ref 37.5–51)
HGB BLD-MCNC: 10.8 G/DL (ref 13–17.7)
HGB RETIC QN AUTO: 31.3 PG (ref 29.8–36.1)
IMM GRANULOCYTES # BLD AUTO: 0.02 10*3/MM3 (ref 0–0.05)
IMM GRANULOCYTES NFR BLD AUTO: 0.3 % (ref 0–0.5)
IMM RETICS NFR: 8.6 % (ref 3–15.8)
IRON 24H UR-MRATE: 24 MCG/DL (ref 59–158)
IRON SATN MFR SERPL: 9 % (ref 20–50)
LYMPHOCYTES # BLD AUTO: 1.58 10*3/MM3 (ref 0.7–3.1)
LYMPHOCYTES NFR BLD AUTO: 26.7 % (ref 19.6–45.3)
MCH RBC QN AUTO: 28.9 PG (ref 26.6–33)
MCHC RBC AUTO-ENTMCNC: 30.6 G/DL (ref 31.5–35.7)
MCV RBC AUTO: 94.4 FL (ref 79–97)
MONOCYTES # BLD AUTO: 0.57 10*3/MM3 (ref 0.1–0.9)
MONOCYTES NFR BLD AUTO: 9.6 % (ref 5–12)
NEUTROPHILS NFR BLD AUTO: 3.52 10*3/MM3 (ref 1.7–7)
NEUTROPHILS NFR BLD AUTO: 59.7 % (ref 42.7–76)
NRBC BLD AUTO-RTO: 0 /100 WBC (ref 0–0.2)
PLATELET # BLD AUTO: 172 10*3/MM3 (ref 140–450)
PMV BLD AUTO: 9.3 FL (ref 6–12)
RBC # BLD AUTO: 3.74 10*6/MM3 (ref 4.14–5.8)
RETICS # AUTO: 0.04 10*6/MM3 (ref 0.02–0.13)
RETICS/RBC NFR AUTO: 1.12 % (ref 0.7–1.9)
TIBC SERPL-MCNC: 267 MCG/DL (ref 298–536)
TRANSFERRIN SERPL-MCNC: 179 MG/DL (ref 200–360)
WBC NRBC COR # BLD AUTO: 5.91 10*3/MM3 (ref 3.4–10.8)

## 2024-12-06 PROCEDURE — 85025 COMPLETE CBC W/AUTO DIFF WBC: CPT | Performed by: INTERNAL MEDICINE

## 2024-12-06 PROCEDURE — 84466 ASSAY OF TRANSFERRIN: CPT | Performed by: INTERNAL MEDICINE

## 2024-12-06 PROCEDURE — 82728 ASSAY OF FERRITIN: CPT | Performed by: INTERNAL MEDICINE

## 2024-12-06 PROCEDURE — 36415 COLL VENOUS BLD VENIPUNCTURE: CPT

## 2024-12-06 PROCEDURE — 85046 RETICYTE/HGB CONCENTRATE: CPT | Performed by: INTERNAL MEDICINE

## 2024-12-06 PROCEDURE — 83540 ASSAY OF IRON: CPT | Performed by: INTERNAL MEDICINE

## 2024-12-06 NOTE — PROGRESS NOTES
Subjective .     REASONS FOR FOLLOWUP:  Esophageal cancer, cytopenias     HISTORY OF PRESENT ILLNESS:  The patient is a 74 y.o. year old male  who is here for follow-up with the above-mentioned history.    No new problems.  No bleeding.  No chest pain or SOA.    Past Medical History:   Diagnosis Date    Acute deep vein thrombosis (DVT) of popliteal vein of left lower extremity 03/24/2020    Anemia     Anti-phospholipid syndrome     On lifelong anticoagulation therapy    Bladder disorder     LEAKAGE   ON MED  wers pads    Bleeding hemorrhoids     Chronic kidney disease     Community acquired pneumonia     HISTORY OF IN 2014    Congestive heart failure     COPD (chronic obstructive pulmonary disease)     Deep venous thrombosis 2006, 2008    Left lower extremity multiple    Depression     Esophageal carcinoma 12/31/2014    had chemo and radiation prior surgery    Gastroparesis     Hemorrhoids     HH (hiatus hernia)     History of atrial fibrillation 2015    ONE EPISODE WHILE HOSPITALIZED    History of kidney stones     History of nephrolithiasis     History of pancreatitis     PT STATES MANY YEARS AGO    History of radiation therapy     History of transfusion     Hypertension     Long-term (current) use of anticoagulants, INR goal 2.0-3.0     Lymphedema     Malignant neoplasm of prostate     Other hyperlipidemia 01/30/2018 January 30, 2018 lipid panel risk 12.8%    Restless legs syndrome     Sleep apnea     OCCASIONALLY WEARS CPAP    Squamous carcinoma     on the head     Past Surgical History:   Procedure Laterality Date    APPENDECTOMY  1950    BRONCHOSCOPY      (Diagnostic)    CARDIAC CATHETERIZATION N/A 05/14/2022    Procedure: Left Heart Cath;  Surgeon: Eric So MD;  Location:  [facility];  Service: Cardiology;  Laterality: N/A;    CARDIAC CATHETERIZATION N/A 05/14/2022    Procedure: Coronary angiography;  Surgeon: rEic So MD;  Location:  [facility]VAS[facility]  LOCATION;  Service: Cardiology;  Laterality: N/A;    CARDIAC CATHETERIZATION N/A 05/14/2022    Procedure: Left ventriculography;  Surgeon: Eric So MD;  Location: [facility] CATH INVASIVE LOCATION;  Service: Cardiology;  Laterality: N/A;    CATARACT EXTRACTION Bilateral 2014    COLONOSCOPY  12/15/2014    Complete / Description: EH, IH, torts, stool, follow-up colonoscopy due in 5 years.    COLONOSCOPY N/A 06/13/2017    non-thrombosed external hemorrhoids, normal examined ileum, IH    COLONOSCOPY N/A 02/26/2019    Procedure: COLONOSCOPY with Cold Polypectomy;  Surgeon: Mulugeta Millan MD;  Location: [facility] ENDOSCOPY;  Service: Gastroenterology    COLONOSCOPY N/A 12/16/2020    Procedure: COLONOSCOPY to cecum into TI;  Surgeon: Mesha Sanchez MD;  Location: [facility] ENDOSCOPY;  Service: Gastroenterology;  Laterality: N/A;  pre: lower GI bleed  post: hemorrhoids     CYSTOSCOPY W/ LASER LITHOTRIPSY      ENDOSCOPY N/A 06/13/2017    Procedure: ESOPHAGOGASTRODUODENOSCOPY WITH COLD BIOPSY;  Surgeon: Lake Gonzalez MD;  Location: [facility] ENDOSCOPY;  Service:     ENDOSCOPY N/A 11/18/2021    Procedure: ESOPHAGOGASTRODUODENOSCOPY WITH  DILATATION WITH FLUOROSCOPY;  Surgeon: Jose Christine III, MD;  Location: [facility] ENDOSCOPY;  Service: Gastroenterology;  Laterality: N/A;  Pre: dysphagia, h/x of adinocarcinoma of esophagus  Post: same    ENDOSCOPY N/A 08/07/2023    Procedure: ESOPHAGOGASTRODUODENOSCOPY;  Surgeon: Jameel Valencia MD;  Location: [facility] ENDOSCOPY;  Service: Gastroenterology;  Laterality: N/A;  PRE- DYSPHAGIA  POST-ABORTED DUE TO RETAINED FOOD    ENDOSCOPY N/A 08/08/2023    Procedure: ESOPHAGOGASTRODUODENOSCOPY with bx;  Surgeon: Jameel Valencia MD;  Location: [facility] ENDOSCOPY;  Service: Gastroenterology;  Laterality: N/A;  pre: N/V, abd pain  post: esophageal nodule, retained food    ESOPHAGECTOMY      April 2015, stage IIB esophageal carcinoma, sub-total resection.    ESOPHAGECTOMY       Esophagectomy Subtotal Andrea Joe Procedure    EXCISION LESION  08/2012    Removal of Squamous Cell CA on Head    HAMMER TOE REPAIR  09/2014    Hammertoe Operation (Each Toe), 10/2014    HAMMER TOE REPAIR Left 10/03/2017    Procedure: Left second third and fourth distal interphalangeal joint resection with flexor tenotomy;  Surgeon: Mulugeta Lira MD;  Location: [facility] OR OSC;  Service:     HEMORRHOIDECTOMY N/A 12/23/2020    Procedure: HEMORRHOIDECTOMY;  Surgeon: Billy Julio Jr., MD;  Location: [facility] MAIN OR;  Service: General;  Laterality: N/A;    HERNIA REPAIR      incisional    JEJUNOSTOMY      Laparoscopic    JEJUNOSTOMY      tube removal     KNEE SURGERY Bilateral 1967, 1973, 1981    PATELLA SURGERY Left     removed    PILONIDAL CYST / SINUS EXCISION      MA ARTHRP KNE CONDYLE&PLATU MEDIAL&LAT COMPARTMENTS Right 03/26/2018    Procedure: TOTAL KNEE ARTHROPLASTY;  Surgeon: Renny Solis MD;  Location: [facility] OR;  Service: Orthopedics    PROSTATECTOMY  2010    PYLOROPLASTY      SIGMOIDOSCOPY N/A 08/02/2023    Procedure: SIGMOIDOSCOPY FLEXIBLE;  Surgeon: Mesha Sanchez MD;  Location: [facility] ENDOSCOPY;  Service: Gastroenterology;  Laterality: N/A;  PRE- ABNORMAL CT  POST- HEMORRHOIDS, ULCERATION    SPINAL FUSION  02/1998    C 5,6    TONSILLECTOMY      UPPER GASTROINTESTINAL ENDOSCOPY  12/15/2014    LA Grade D esophagitis, Pardo's, HH, multiple duodenal ulcers    VENTRAL/INCISIONAL HERNIA REPAIR N/A 04/14/2016    Procedure: VENTRAL/INCISIONAL HERNIA REPAIR, open, with mesh, and component separation;  Surgeon: Darren Rivas MD;  Location: [facility] OR;  Service:        HEMATOLOGIC/ONCOLOGIC HISTORY:  (History from previous dates can be found in the separate document.)    MEDICATIONS    Current Outpatient Medications:     acetaminophen (TYLENOL) 325 MG tablet, Take 2 tablets by mouth Every 6 (Six) Hours As Needed for Mild Pain, Headache or Fever., Disp: , Rfl:     atorvastatin  (LIPITOR) 40 MG tablet, Take 1 tablet by mouth Every Night., Disp: 90 tablet, Rfl: 2    Budeson-Glycopyrrol-Formoterol (Breztri Aerosphere) 160-9-4.8 MCG/ACT aerosol inhaler, Inhale 2 puffs 2 (Two) Times a Day., Disp: 10.7 g, Rfl: 5    Cholecalciferol (Vitamin D3) 50 MCG (2000 UT) capsule, Take 1 capsule by mouth Daily., Disp: , Rfl:     cyanocobalamin 1000 MCG/ML injection, Inject 1 mL under the skin into the appropriate area as directed Every 30 (Thirty) Days. Indications: Inadequate Vitamin B12, Disp: 4 mL, Rfl: 5    Eliquis 5 MG tablet tablet, TAKE ONE TABLET BY MOUTH EVERY 12 HOURS, Disp: 60 tablet, Rfl: 1    EPINEPHrine, Anaphylaxis, (ADRENALIN) 1 MG/ML injection, Inject 0.3 mL into the appropriate muscle as directed by prescriber As Needed for Anaphylaxis. Indications: Life-Threatening Hypersensitivity Reaction, Disp: , Rfl:     gabapentin (Neurontin) 100 MG capsule, Take 1 capsule by mouth 3 (Three) Times a Day., Disp: 90 capsule, Rfl: 2    metoclopramide (Reglan) 5 MG tablet, Take 1 tablet by mouth 2 (Two) Times a Day. You will need to schedule an office apt for further refills, Disp: 60 tablet, Rfl: 5    mupirocin (BACTROBAN) 2 % ointment, Apply  topically to the appropriate area as directed., Disp: , Rfl:     ondansetron ODT (ZOFRAN-ODT) 4 MG disintegrating tablet, Place 1 tablet on the tongue Every 8 (Eight) Hours As Needed for Nausea or Vomiting (for nausea and dry heaves)., Disp: 45 tablet, Rfl: 0    PARoxetine (PAXIL) 40 MG tablet, TAKE ONE TABLET BY MOUTH EVERY MORNING, Disp: 30 tablet, Rfl: 5    polyethylene glycol (MIRALAX) 17 g packet, Take 17 g by mouth Daily. (Patient taking differently: Take 17 g by mouth Daily.), Disp: 30 each, Rfl: 0    pramipexole (MIRAPEX) 1.5 MG tablet, Take 1 tablet by mouth 2 (Two) Times a Day. Indications: Restless Leg Syndrome, Disp: 180 tablet, Rfl: 1    furosemide (LASIX) 20 MG tablet, Take 1 tablet by mouth Daily. (Patient not taking: Reported on 12/6/2024), Disp: ,  "Rfl:     ALLERGIES:     No Known Allergies      SOCIAL HISTORY:       Social History     Socioeconomic History    Marital status:      Spouse name: Lena    Number of children: 0    Years of education: College   Tobacco Use    Smoking status: Former     Current packs/day: 0.00     Average packs/day: 2.0 packs/day for 3.0 years (6.0 ttl pk-yrs)     Types: Cigarettes     Start date:      Quit date:      Years since quittin.9     Passive exposure: Past (father smoked in the home)    Smokeless tobacco: Former     Types: Chew    Tobacco comments:     Caffeine - coffee and soda    Vaping Use    Vaping status: Never Used   Substance and Sexual Activity    Alcohol use: Yes     Alcohol/week: 3.0 standard drinks of alcohol     Types: 1 Glasses of wine, 1 Cans of beer, 1 Shots of liquor per week     Comment: 2    Drug use: Never     Comment: hx marijuana use \"a long time ago\"    Sexual activity: Defer     Partners: Male     Birth control/protection: Contraceptive sponge         FAMILY HISTORY:  Family History   Problem Relation Age of Onset    Cerebral aneurysm Mother         cerebral artery aneurysm ( age 56)    Anxiety disorder Father     Suicide Attempts Father          of suicide    Cancer Father         bladder    Prostate cancer Brother 68    No Known Problems Brother     Pancreatic cancer Nephew     Colon cancer Neg Hx     Esophageal cancer Neg Hx     Dementia Neg Hx     Malig Hyperthermia Neg Hx        REVIEW OF SYSTEMS:    Review of Systems   Constitutional:  Negative for activity change.   HENT:  Negative for nosebleeds and trouble swallowing.    Respiratory:  Negative for wheezing.    Cardiovascular:  Negative for chest pain and palpitations.   Gastrointestinal:  Negative for constipation and diarrhea.   Genitourinary:  Negative for dysuria and hematuria.   Musculoskeletal:  Negative for arthralgias and myalgias.   Neurological:  Negative for seizures and syncope.   Hematological:  " "Negative for adenopathy. Does not bruise/bleed easily.   Psychiatric/Behavioral:  Negative for confusion.            Objective    Vitals:    12/06/24 1437   BP: 106/64   Pulse: 88   Resp: 16   Temp: 97.3 °F (36.3 °C)   TempSrc: Oral   SpO2: 98%   Weight: 78.9 kg (174 lb)   Height: 195 cm (76.77\")   PainSc: 0-No pain           12/6/2024     2:40 PM   Current Status   ECOG score 1      PHYSICAL EXAM:        CONSTITUTIONAL:  Vital signs reviewed.  No distress, looks comfortable.  EYES:  Conjunctiva and lids unremarkable.  PERRLA  EARS,NOSE,MOUTH,THROAT:  Ears and nose appear unremarkable.  Lips, teeth, gums appear unremarkable.  RESPIRATORY:  Normal respiratory effort.  Lungs clear to auscultation bilaterally.  CARDIOVASCULAR:  Normal S1, S2.  No murmurs rubs or gallops.  Bilateral lower extremity unchanged  GASTROINTESTINAL: Abdomen appears unremarkable.  Nontender.  No hepatomegaly.  No splenomegaly.  LYMPHATIC:  No cervical, supraclavicular, axillary lymphadenopathy.  SKIN:  Warm.  No rashes.  PSYCHIATRIC:  Normal judgment and insight.  Normal mood and affect.           RECENT LABS:        WBC   Date/Time Value Ref Range Status   12/06/2024 02:25 PM 5.91 3.40 - 10.80 10*3/mm3 Final   07/28/2024 09:10 PM 4.28 (L) 4.5 - 11.0 10*3/uL Final     Hemoglobin   Date/Time Value Ref Range Status   12/06/2024 02:25 PM 10.8 (L) 13.0 - 17.7 g/dL Final   07/28/2024 09:10 PM 10.8 (L) 13.5 - 17.5 g/dL Final     Platelets   Date/Time Value Ref Range Status   12/06/2024 02:25  140 - 450 10*3/mm3 Final   07/28/2024 09:10  140 - 440 10*3/uL Final       Assessment/Plan     ASSESSMENT:  Iron deficiency anemia, unspecified iron deficiency anemia type  - Ferritin  - Iron Profile  - Retic With IRF & RET-He  - CBC & Differential        *Esophageal adenocarcinoma. Initially T2N0M0. After neoadjuvant carboplatin/Taxol with radiation, achieved a pathologic CR at resection, 4/24/2015.   As per NCCN guidelines, plan CAT scans every 6 " months x1 year, then every 6 to 9 months on years 2 and years 3. Defer any surveillance EGDs to Dr. Gonzalez if he feels they are appropriate.   Also as per NCCN guidelines, plan H and P every 3 to 6 months on years 1 and years 2, then every 6 to 12 months' time on years 3 through years 5 and then annually.   EGD 6/13/17 by Dr. Gonzalez: No evidence of recurrence.  CT scan 3/2/18 (this completed 3 years of surveillance CT): No evidence of recurrence.  Plan no more surveillance CT.  EGD 11/18/2021, Dr. Christine: No evidence of recurrent cancer.  Dilated.  No evidence of recurrence.  Remains in remission.    *Recurrent DVT, prior to cancer diagnosis.    Dr. Bolton previously managed his Coumadin.   Chronic left leg larger than right, since DVT  Acute left popliteal, gastrocnemius DVT on 3/24/2020 despite INR 3.4.  Therefore, changed to Eliquis.  On 3/25/2020, unremarkable: Lupus anticoagulant, beta-2 glycoprotein antibodies.  Anticardiolipin antibodies also felt to be unremarkable with IgG and IgM both at 15 (negative is <15.  Indeterminate is 15-20).  No evidence of recurrent DVT.  Continue on Eliquis.  No bleeding issues    *CT angiogram chest 3/24/2020 (LLE acute DVT found 3/24/2020): Mild increased opacity LLL may represent developing infiltrate versus atelectasis.  Radiologist recommended follow-up.  CT chest 5/1/2020: Resolution of groundglass LLL infiltrate from the 3/24/2020 CT.  A few mildly enlarged mediastinal nodes are less prominent than on the 3/18/2020 CT.  No new abnormalities.  Plan no more follow-up CT scans.    *Persistent cough.  He has seen Dr. Maher Sayied for this.    He did not complain of this today.    *Previously complained of emesis occurring 5 hours after eating on average once every month or 2.  No nausea otherwise.  Denies dysphagia or odynophagia.    Unchanged.  Occurring on average once every couple of months.  He did not complain of this today    *Iron deficiency anemia  Hb mostly around 11  On  4/15/2019, ferritin 29.7, 13% saturation.  Serum folate normal.  On oral iron daily through PCP.  On 8/23/19, ferritin 65, 14%.   Increased oral iron.   On 10/15/19, ferritin 72, 10%.  On 10/25/2019, stopped oral iron since it was not helping.    Oral iron not effective due to poor absorption  2 doses Injectafer.  On 12/13/2019, ferritin 708, 15% saturation, Hb 10.4.  Therefore, no IV iron.  Monitor.  On 5/5/2020, ferritin 346, 15% saturation, Hb 9.9.  Therefore, no need for IV iron at this time.  On 7/7/2020, Hb up to 11.7.  Iron labs normal.  Admission 12/15/2020: Hb 6.6.  Ferritin 40, iron saturation 17%.  Discharged on 12/21/2020 with Hb 7.1, which fell from 7.9 on 12/18/2020, from 8.9 in the early morning 12/18/2020.  The drop in Hb was thought to be due to hemorrhoidal bleeding related to Eliquis.  Eliquis was stopped.  Hemorrhoidal repair planned by Dr. Flynn Julio after the holidays.  Was given Venofer 300 mg on 12/17/2020 by Dr. Ross of our practice  On 12/29/2020, ferritin 176, 13% saturation, Hb 8.4, reticulate hemoglobin low at 25.7.  Suspect he would benefit from some more iron.  Given 1 dose Injectafer.  On 2/2/2021, ferritin 317, 17% saturation, Hb 10.3.  Therefore, Hb gradually improved since the 1 dose of Injectafer.  3/2/2021: Ferritin iron 93, 11% saturation, Hb 11.9.  1 dose Injectafer.  3/23/2021: Ferritin 471, 20% saturation, Hb 10.8  7/6/2021: Ferritin 329, 21% saturation, Hb 10.8  9/28/2021: Ferritin 230, 20% saturation, Hb 11.4.  No need for IV iron.  12/28/2021: Ferritin 337, 6% iron saturation, Hb 10.4.  No IV iron given.  (Although saturation is low, ferritin is 337).  3/22/2022: Ferritin 112, 12% saturation, Hb 10.6.  Plan 1 dose Injectafer.  5/31/2022: Ferritin 473, 23% saturation, Hb 11.4  8/30/2022: Ferritin 308, 17% saturation, Hb 11.7.  No need for Injectafer  11/22/2022: Ferritin 268, 14% saturation.  Hb 11.9.  No need for Injectafer.  2/15/2023: Hb 11.  Ferritin 247, 19%  saturation.  No need for Injectafer.  5/16/2023: Hb 12.  Ferritin 358, 15% saturation.  No need for Injectafer.  8/15/23: Hb 10.3  11/9/2023: Hb 9.4.  Ferritin 458, 8% saturation.  Arrange 1 dose Injectafer.  If insurance will not cover Injectafer then arrange Venofer 200 mg x 2 doses  1/16/2024: Hb 10, ferritin 234, 20% saturation  5/7/2024: Hb 11.9.  Ferritin 286, 10% saturation.  Venofer 200 mg x 3 doses  8/2/2024: Hb 11.6.  Ferritin 427, 18% saturation.  No need for Venofer  12/6/2024: Hb 10.8.  Ferritin 578, 9% saturation.  Reticulated Hb 31 await iron labs.  Although saturation is 9%, with ferritin 578, avoid IV iron at this time    *Hemorrhoidal bleeding with Hb down to 6.6, requiring admission, 12/15/2020.  Thought to be related to Eliquis.  Eliquis was stopped.  Hemorrhoidal repair by Dr. Flynn Julio, 12/23/2020  Eliquis subsequently restarted with no more issues with bleeding.  1 episode of dark stools yesterday, 9/27/2021.  Told him if he notices more of this he should contact Dr. Sanchez.  3/22/2022: Denies any rectal bleeding.  States this has not really been an issue since the hemorrhoidal repair  8/30/2022: Denies bleeding  11/22/2022: Denies bleeding  5/16/2023: Denies bleeding  11/9/2023: Denies bleeding    *9/28/2021: Change in stool frequency.  Was having a bowel movement 3 times per week.  For the past week has been having 3 formed bowel movements per day.  I told him if this persists he should discuss with Dr. Sanchez.    *Source of iron deficiency.  Last colonoscopy 2/26/2019 by Dr. Millan.  Last EGD 6/13/2017 by Dr. Gonzalez.  Suspect he has an absorption issue due to previous esophageal surgery.    *B12 deficiency.  On 6/6/2019, B12 <150  On B12 injections through PCP.  B12 on 5/5/2020 normal at 694  B12 on 2/2/2021, 789  5/16/2023 B12 1124  11/9/2023: B12 615  B12 557 on 8/2/2024    *Anemia for reasons in addition to iron deficiency and B12 deficiency  Baseline Hb mostly 10.5-11.5.  On  5/5/2020, unremarkable: RBC folate, iron labs, B12, haptoglobin, TB, LDH, direct Maki.  Creatinine baseline is 0.9-1.2   Hb 9.9 on 5/5/2020  On 7/7/2020, Hb up to 11.7.  Return to prior follow-up plan.  On 2/2/2021, B12 and RBC folate unremarkable  3/23/2021: Hb 10.8 despite normal iron stores.  5/25/2021, Hb 10.7 with normal iron labs  7/6/2021, Hb 10.8 with normal iron labs  Hb 11.6, from 11.9, from 10, from 9.4, from 10.3, from 12, from 11, from 10.2, from 11.9, from 11.7, from 11.4    *Leukocytopenia  New issue on 3/23/2021, WBC 3.38, based on recent labs, but WBC has been as low as 2.9 December 2019  WBC 5.9, from 5, from 4.8, from 3.9, from 6.4, from 4.6, from 3.9, from 4.3, from 4.1, from 4.6, from 4    *Neutropenia  ANC 2050, from 1590 on 3/23/2021 (previously ANC has been as low as 1480 with WBC in the low normal range)  ANC 3520, from 2550, from 1480, from 4230, from 2910, from 2150, from 2160, from 2110, from 2560, from 1690    *Thrombocytopenia  New issue on 3/23/2021, , based on recent labs, but PLT has been as low as 119 October 2019  , from 160, from 158, from 167, from 274, from 201, from 180, from 163, from 155, from 174, from 158    *Dyspnea on exertion x2 weeks on 11/22/2022 visit  Advised to discuss with his PCP and in the meantime if it worsens he should go to the ER    *Admitted early January 2023 for respiratory failure due to RSV.  Also had A-fib with RVR.    *He had a white blood cell count in the upper 3s and a hemoglobin in the upper 12s with a normal platelet count prior to beginning chemotherapy.     PLAN:  MD CBC stat ferritin, iron panel, reticulate hemoglobin, B12 level 3 months  (I offered 6-month follow-up but he prefers to maintain 3-month)  Baseline Hb when not in the hospital mostly 9.5-11.5  Continue Eliquis  No need for Venofer currently    hemorrhoids have been repaired.  Last drop of Hb was down to 7.1 on 12/21/2020.  (Hemorrhoidal bleeding)  He states   Yana is planning an EGD.      Prior plan was:  MD every 6-month.  No more surveillance CT scans.  He does not have a port.    I have discussed with him if Hb drops and remains around 9 or so, we may want to plan a bone marrow biopsy

## 2024-12-09 NOTE — TELEPHONE ENCOUNTER
Called pt and advised of Dr Phelan's note. Advised last take to take Eliquis would be today.   Verb understanding.     Update sent to Dr Millan.

## 2024-12-11 ENCOUNTER — TELEPHONE (OUTPATIENT)
Dept: GASTROENTEROLOGY | Facility: CLINIC | Age: 74
End: 2024-12-11
Payer: MEDICARE

## 2024-12-12 ENCOUNTER — HOSPITAL ENCOUNTER (OUTPATIENT)
Facility: HOSPITAL | Age: 74
Setting detail: HOSPITAL OUTPATIENT SURGERY
Discharge: HOME | End: 2024-12-12
Attending: INTERNAL MEDICINE | Admitting: INTERNAL MEDICINE
Payer: MEDICARE

## 2024-12-12 ENCOUNTER — ANESTHESIA (OUTPATIENT)
Dept: GASTROENTEROLOGY | Facility: HOSPITAL | Age: 74
End: 2024-12-12
Payer: MEDICARE

## 2024-12-12 ENCOUNTER — ANESTHESIA EVENT (OUTPATIENT)
Dept: GASTROENTEROLOGY | Facility: HOSPITAL | Age: 74
End: 2024-12-12
Payer: MEDICARE

## 2024-12-12 VITALS
BODY MASS INDEX: 20.82 KG/M2 | HEIGHT: 76 IN | SYSTOLIC BLOOD PRESSURE: 124 MMHG | WEIGHT: 171 LBS | RESPIRATION RATE: 16 BRPM | DIASTOLIC BLOOD PRESSURE: 75 MMHG | HEART RATE: 69 BPM | OXYGEN SATURATION: 100 %

## 2024-12-12 DIAGNOSIS — K31.84 GASTROPARESIS: ICD-10-CM

## 2024-12-12 DIAGNOSIS — Z85.01 PERSONAL HISTORY OF ESOPHAGEAL CANCER: ICD-10-CM

## 2024-12-12 DIAGNOSIS — R11.0 NAUSEA: ICD-10-CM

## 2024-12-12 PROCEDURE — 25010000002 LIDOCAINE 2% SOLUTION: Performed by: NURSE ANESTHETIST, CERTIFIED REGISTERED

## 2024-12-12 PROCEDURE — S0260 H&P FOR SURGERY: HCPCS | Performed by: INTERNAL MEDICINE

## 2024-12-12 PROCEDURE — 88312 SPECIAL STAINS GROUP 1: CPT | Performed by: INTERNAL MEDICINE

## 2024-12-12 PROCEDURE — 25810000003 LACTATED RINGERS PER 1000 ML: Performed by: INTERNAL MEDICINE

## 2024-12-12 PROCEDURE — 43239 EGD BIOPSY SINGLE/MULTIPLE: CPT | Performed by: INTERNAL MEDICINE

## 2024-12-12 PROCEDURE — 25010000002 PROPOFOL 10 MG/ML EMULSION: Performed by: NURSE ANESTHETIST, CERTIFIED REGISTERED

## 2024-12-12 PROCEDURE — 88313 SPECIAL STAINS GROUP 2: CPT | Performed by: INTERNAL MEDICINE

## 2024-12-12 PROCEDURE — 88305 TISSUE EXAM BY PATHOLOGIST: CPT | Performed by: INTERNAL MEDICINE

## 2024-12-12 RX ORDER — LIDOCAINE HYDROCHLORIDE 20 MG/ML
INJECTION, SOLUTION INFILTRATION; PERINEURAL AS NEEDED
Status: DISCONTINUED | OUTPATIENT
Start: 2024-12-12 | End: 2024-12-12 | Stop reason: SURG

## 2024-12-12 RX ORDER — SODIUM CHLORIDE 0.9 % (FLUSH) 0.9 %
10 SYRINGE (ML) INJECTION AS NEEDED
Status: DISCONTINUED | OUTPATIENT
Start: 2024-12-12 | End: 2024-12-12 | Stop reason: HOSPADM

## 2024-12-12 RX ORDER — SODIUM CHLORIDE, SODIUM LACTATE, POTASSIUM CHLORIDE, CALCIUM CHLORIDE 600; 310; 30; 20 MG/100ML; MG/100ML; MG/100ML; MG/100ML
30 INJECTION, SOLUTION INTRAVENOUS CONTINUOUS
Status: DISCONTINUED | OUTPATIENT
Start: 2024-12-12 | End: 2024-12-12 | Stop reason: HOSPADM

## 2024-12-12 RX ORDER — SODIUM CHLORIDE, SODIUM LACTATE, POTASSIUM CHLORIDE, CALCIUM CHLORIDE 600; 310; 30; 20 MG/100ML; MG/100ML; MG/100ML; MG/100ML
30 INJECTION, SOLUTION INTRAVENOUS CONTINUOUS PRN
Status: DISCONTINUED | OUTPATIENT
Start: 2024-12-12 | End: 2024-12-12 | Stop reason: HOSPADM

## 2024-12-12 RX ORDER — SODIUM CHLORIDE 9 MG/ML
40 INJECTION, SOLUTION INTRAVENOUS AS NEEDED
Status: DISCONTINUED | OUTPATIENT
Start: 2024-12-12 | End: 2024-12-12 | Stop reason: HOSPADM

## 2024-12-12 RX ORDER — SODIUM CHLORIDE 0.9 % (FLUSH) 0.9 %
10 SYRINGE (ML) INJECTION EVERY 12 HOURS SCHEDULED
Status: DISCONTINUED | OUTPATIENT
Start: 2024-12-12 | End: 2024-12-12 | Stop reason: HOSPADM

## 2024-12-12 RX ORDER — PROPOFOL 10 MG/ML
VIAL (ML) INTRAVENOUS AS NEEDED
Status: DISCONTINUED | OUTPATIENT
Start: 2024-12-12 | End: 2024-12-12 | Stop reason: SURG

## 2024-12-12 RX ADMIN — LIDOCAINE HYDROCHLORIDE 100 MG: 20 INJECTION, SOLUTION INFILTRATION; PERINEURAL at 10:27

## 2024-12-12 RX ADMIN — SODIUM CHLORIDE, SODIUM LACTATE, POTASSIUM CHLORIDE, CALCIUM CHLORIDE 30 ML/HR: 20; 30; 600; 310 INJECTION, SOLUTION INTRAVENOUS at 09:53

## 2024-12-12 RX ADMIN — PROPOFOL 30 MG: 10 INJECTION, EMULSION INTRAVENOUS at 10:31

## 2024-12-12 RX ADMIN — PROPOFOL 60 MG: 10 INJECTION, EMULSION INTRAVENOUS at 10:27

## 2024-12-12 RX ADMIN — PROPOFOL 20 MG: 10 INJECTION, EMULSION INTRAVENOUS at 10:29

## 2024-12-12 NOTE — ANESTHESIA POSTPROCEDURE EVALUATION
Patient: Jose Hurtado    Procedure Summary       Date: 12/12/24 Room / Location:  [facility] / [facility] ENDOSCOPY    Anesthesia Start: 1022 Anesthesia Stop: 1037    Procedure: ESOPHAGOGASTRODUODENOSCOPY with cold biopsies (Esophagus) Diagnosis:       Nausea      Personal history of esophageal cancer      Gastric bezoar      (Gastroparesis [K31.84])      (Nausea [R11.0])      (Personal history of esophageal cancer [Z85.01])    Surgeons: Mulugeta Millan MD Provider: Eric Soliman MD    Anesthesia Type: MAC ASA Status: 3            Anesthesia Type: MAC    Vitals  Vitals Value Taken Time   /75 12/12/24 1057   Temp     Pulse 80 12/12/24 1058   Resp 16 12/12/24 1056   SpO2 100 % 12/12/24 1057   Vitals shown include unfiled device data.        Post Anesthesia Care and Evaluation    Patient location during evaluation: PACU  Patient participation: complete - patient participated  Level of consciousness: awake and alert  Pain management: adequate    Airway patency: patent  Anesthetic complications: No anesthetic complications    Cardiovascular status: acceptable  Respiratory status: acceptable  Hydration status: acceptable    Comments: --------------------            12/12/24               1056     --------------------   BP:       124/75     Pulse:      69       Resp:       16       SpO2:      100%     --------------------

## 2024-12-12 NOTE — DISCHARGE INSTRUCTIONS

## 2024-12-12 NOTE — ANESTHESIA POSTPROCEDURE EVALUATION
Patient: Jose Hurtado    Procedure Summary       Date: 12/12/24 Room / Location:  [facility] / [facility] ENDOSCOPY    Anesthesia Start: 1022 Anesthesia Stop: 1037    Procedure: ESOPHAGOGASTRODUODENOSCOPY with cold biopsies (Esophagus) Diagnosis:       Nausea      Personal history of esophageal cancer      Gastric bezoar      (Gastroparesis [K31.84])      (Nausea [R11.0])      (Personal history of esophageal cancer [Z85.01])    Surgeons: Mulugeta Millan MD Provider: Eric Soliman MD    Anesthesia Type: Not recorded ASA Status: Not recorded            Anesthesia Type: No value filed.    Vitals  Vitals Value Taken Time   /75 12/12/24 1057   Temp     Pulse 80 12/12/24 1058   Resp 16 12/12/24 1056   SpO2 100 % 12/12/24 1057   Vitals shown include unfiled device data.        Post Anesthesia Care and Evaluation    Patient location during evaluation: PACU  Patient participation: complete - patient participated  Level of consciousness: awake and alert  Pain management: adequate    Airway patency: patent  Anesthetic complications: No anesthetic complications    Cardiovascular status: acceptable  Respiratory status: acceptable  Hydration status: acceptable    Comments: --------------------            12/12/24               1056     --------------------   BP:       124/75     Pulse:      69       Resp:       16       SpO2:      100%     --------------------

## 2024-12-12 NOTE — ANESTHESIA PREPROCEDURE EVALUATION
Anesthesia Evaluation     Patient summary reviewed and Nursing notes reviewed                Airway   Mallampati: II  Dental    (+) lower dentures, partials and poor dentition    Pulmonary    (+) a smoker Former, COPD,shortness of breath, sleep apnea  Cardiovascular     ECG reviewed  Rhythm: regular    (+) hypertension, past MI , dysrhythmias Paroxysmal Atrial Fib, PVC, CHF , DVT, hyperlipidemia      Neuro/Psych  (+) tremors, numbness, psychiatric history Anxiety and Depression  GI/Hepatic/Renal/Endo    (+) hiatal hernia, GI bleeding , renal disease- CRI    Musculoskeletal (-) negative ROS    Abdominal    Substance History   (+) alcohol use     OB/GYN negative ob/gyn ROS         Other      history of cancer                  Anesthesia Plan    ASA 3     MAC     (SR)  intravenous induction     Anesthetic plan, risks, benefits, and alternatives have been provided, discussed and informed consent has been obtained with: patient.    CODE STATUS:

## 2024-12-12 NOTE — H&P
[facility]  Pre Procedure History & Physical    Chief Complaint:   Nausea, gastroparesis, history of esophageal cancer with pull-through, Pardo's esophagus    Subjective     HPI:   This 74-year-old male presents the endoscopy suite for upper endoscopic evaluation.  He has a history of esophageal cancer with resection and gastric pull-through and underwent upper endoscopy in August of last year revealing some inflammation at the anastomosis as well as evidence of fungal infection and Pardo's changes.  He continues to have nausea with dry heaves.  A gastric emptying study performed last year did show delay in emptying    Past Medical History:   Past Medical History:   Diagnosis Date    Acute deep vein thrombosis (DVT) of popliteal vein of left lower extremity 03/24/2020    Anemia     Anti-phospholipid syndrome     On lifelong anticoagulation therapy    Bladder disorder     LEAKAGE   ON MED  wers pads    Bleeding hemorrhoids     Chronic kidney disease     Community acquired pneumonia     HISTORY OF IN 2014    Congestive heart failure     COPD (chronic obstructive pulmonary disease)     Deep venous thrombosis 2006, 2008    Left lower extremity multiple    Depression     Esophageal carcinoma 12/31/2014    had chemo and radiation prior surgery    Gastroparesis     Hemorrhoids     HH (hiatus hernia)     History of atrial fibrillation 2015    ONE EPISODE WHILE HOSPITALIZED    History of kidney stones     History of nephrolithiasis     History of pancreatitis     PT STATES MANY YEARS AGO    History of radiation therapy     History of transfusion     Hypertension     Long-term (current) use of anticoagulants, INR goal 2.0-3.0     Lymphedema     Malignant neoplasm of prostate     Other hyperlipidemia 01/30/2018 January 30, 2018 lipid panel risk 12.8%    Restless legs syndrome     Sleep apnea     OCCASIONALLY WEARS CPAP    Squamous carcinoma     on the head       Past Surgical History:  Past  Surgical History:   Procedure Laterality Date    APPENDECTOMY  1950    BRONCHOSCOPY      (Diagnostic)    CARDIAC CATHETERIZATION N/A 05/14/2022    Procedure: Left Heart Cath;  Surgeon: Eric So MD;  [facility];  Service: Cardiology;  Laterality: N/A;    CARDIAC CATHETERIZATION N/A 05/14/2022    Procedure: Coronary angiography;  Surgeon: Eric So MD;  [facility];  Service: Cardiology;  Laterality: N/A;    CARDIAC CATHETERIZATION N/A 05/14/2022    Procedure: Left ventriculography;  Surgeon: Eric So MD;  [facility];  Service: Cardiology;  Laterality: N/A;    CATARACT EXTRACTION Bilateral 2014    COLONOSCOPY  12/15/2014    Complete / Description: EH, IH, torts, stool, follow-up colonoscopy due in 5 years.    COLONOSCOPY N/A 06/13/2017    non-thrombosed external hemorrhoids, normal examined ileum, IH    COLONOSCOPY N/A 02/26/2019    Procedure: COLONOSCOPY with Cold Polypectomy;  Surgeon: Mulugeta Millan MD;  Location: [facility] ENDOSCOPY;  Service: Gastroenterology    COLONOSCOPY N/A 12/16/2020    Procedure: COLONOSCOPY to cecum into TI;  Surgeon: Mesha Sanchez MD;  Location: [facility] ENDOSCOPY;  Service: Gastroenterology;  Laterality: N/A;  pre: lower GI bleed  post: hemorrhoids     CYSTOSCOPY W/ LASER LITHOTRIPSY      ENDOSCOPY N/A 06/13/2017    Procedure: ESOPHAGOGASTRODUODENOSCOPY WITH COLD BIOPSY;  Surgeon: Lake Gonzalez MD;  Location: [facility] ENDOSCOPY;  Service:     ENDOSCOPY N/A 11/18/2021    Procedure: ESOPHAGOGASTRODUODENOSCOPY WITH  DILATATION WITH FLUOROSCOPY;  Surgeon: Jose Christine III, MD;  Location: [facility] ENDOSCOPY;  Service: Gastroenterology;  Laterality: N/A;  Pre: dysphagia, h/x of adinocarcinoma of esophagus  Post: same    ENDOSCOPY N/A 08/07/2023    Procedure: ESOPHAGOGASTRODUODENOSCOPY;  Surgeon: Jameel Valencia MD;  Location: [facility] ENDOSCOPY;  Service:  Gastroenterology;  Laterality: N/A;  PRE- DYSPHAGIA  POST-ABORTED DUE TO RETAINED FOOD    ENDOSCOPY N/A 08/08/2023    Procedure: ESOPHAGOGASTRODUODENOSCOPY with bx;  Surgeon: Jameel Valencia MD;  Location: [facility] ENDOSCOPY;  Service: Gastroenterology;  Laterality: N/A;  pre: N/V, abd pain  post: esophageal nodule, retained food    ESOPHAGECTOMY      April 2015, stage IIB esophageal carcinoma, sub-total resection.    ESOPHAGECTOMY      Esophagectomy Subtotal Printer Joe Procedure    EXCISION LESION  08/2012    Removal of Squamous Cell CA on Head    HAMMER TOE REPAIR  09/2014    Hammertoe Operation (Each Toe), 10/2014    HAMMER TOE REPAIR Left 10/03/2017    Procedure: Left second third and fourth distal interphalangeal joint resection with flexor tenotomy;  Surgeon: Mulugeta Lira MD;  Location: [facility] OR OSC;  Service:     HEMORRHOIDECTOMY N/A 12/23/2020    Procedure: HEMORRHOIDECTOMY;  Surgeon: Billy Julio Jr., MD;  Location: [facility] MAIN OR;  Service: General;  Laterality: N/A;    HERNIA REPAIR      incisional    JEJUNOSTOMY      Laparoscopic    JEJUNOSTOMY      tube removal     KNEE SURGERY Bilateral 1967, 1973, 1981    PATELLA SURGERY Left     removed    PILONIDAL CYST / SINUS EXCISION      MA ARTHRP KNE CONDYLE&PLATU MEDIAL&LAT COMPARTMENTS Right 03/26/2018    Procedure: TOTAL KNEE ARTHROPLASTY;  Surgeon: Renny Solis MD;  Location: [facility] MAIN OR;  Service: Orthopedics    PROSTATECTOMY  2010    PYLOROPLASTY      SIGMOIDOSCOPY N/A 08/02/2023    Procedure: SIGMOIDOSCOPY FLEXIBLE;  Surgeon: Mesha Sanchez MD;  Location: [facility] ENDOSCOPY;  Service: Gastroenterology;  Laterality: N/A;  PRE- ABNORMAL CT  POST- HEMORRHOIDS, ULCERATION    SPINAL FUSION  02/1998    C 5,6    TONSILLECTOMY      UPPER GASTROINTESTINAL ENDOSCOPY  12/15/2014    LA Grade D esophagitis, Pardo's, HH, multiple duodenal ulcers    VENTRAL/INCISIONAL HERNIA REPAIR N/A 04/14/2016    Procedure: VENTRAL/INCISIONAL HERNIA REPAIR,  open, with mesh, and component separation;  Surgeon: Darren Rivas MD;  Location: [facility] OR;  Service:        Family History:  Family History   Problem Relation Age of Onset    Cerebral aneurysm Mother         cerebral artery aneurysm ( age 56)    Anxiety disorder Father     Suicide Attempts Father          of suicide    Cancer Father         bladder    Prostate cancer Brother 68    No Known Problems Brother     Pancreatic cancer Nephew     Colon cancer Neg Hx     Esophageal cancer Neg Hx     Dementia Neg Hx     Malig Hyperthermia Neg Hx        Social History:   reports that he quit smoking about 50 years ago. His smoking use included cigarettes. He started smoking about 53 years ago. He has a 6 pack-year smoking history. He has been exposed to tobacco smoke. He has quit using smokeless tobacco.  His smokeless tobacco use included chew. He reports current alcohol use of about 3.0 standard drinks of alcohol per week. He reports that he does not use drugs.    Medications:   No medications prior to admission.       Allergies:  Patient has no known allergies.    ROS:    Pertinent items are noted in HPI     Objective     There were no vitals taken for this visit.    Physical Exam   Constitutional: Pt is oriented to person, place, and time and well-developed, well-nourished, and in no distress.   Mouth/Throat: Oropharynx is clear and moist.   Neck: Normal range of motion.   Cardiovascular: Normal rate, regular rhythm and normal heart sounds.    Pulmonary/Chest: Effort normal and breath sounds normal.   Abdominal: Soft. Nontender  Skin: Skin is warm and dry.   Psychiatric: Mood, memory, affect and judgment normal.     Assessment & Plan     Diagnosis:  Nausea  History of esophageal cancer with pull-through  Gastroparesis  Pardo's esophagus    Anticipated Surgical Procedure:  EGD    The risks, benefits, and alternatives of this procedure have been discussed with the patient or the responsible party-  the patient understands and agrees to proceed.

## 2024-12-13 LAB
CYTO UR: NORMAL
LAB AP CASE REPORT: NORMAL
LAB AP DIAGNOSIS COMMENT: NORMAL
PATH REPORT.FINAL DX SPEC: NORMAL
PATH REPORT.GROSS SPEC: NORMAL

## 2024-12-18 ENCOUNTER — DOCUMENTATION (OUTPATIENT)
Dept: ONCOLOGY | Facility: CLINIC | Age: 74
End: 2024-12-18
Payer: MEDICARE

## 2024-12-18 NOTE — PROGRESS NOTES
Stephon, Thanks for letting me know.  I am usually a little hesitant to give IV iron in the ferritins this high but if he is having severe issues with restless leg syndrome, I think it would be reasonable.    Yolanda, please call patient and tell him Dr. Sommers contacted me and informed me patient's restless legs are significant.  I agree, restless legs can be more problematic when there is iron deficiency.  I think he should have some IV iron knowing he is having significant issues with restless legs    Yolanda, please arrange 50% of the calculated dose of INFeD  (Not using 100% of the calculated dose because ferritin is already >500).    ===View-only below this line===  ----- Message -----  From: Stephon Sommers MD  Sent: 12/18/2024   1:53 PM EST  To: George Phelan II, MD  Subject: Iron def                                         Jose Doran has severe RLS and now taking 2 mg of pramipexole qhs. This gets worse with iron def. Any thoughts of replacing iron ?    Stephon

## 2024-12-24 ENCOUNTER — TELEPHONE (OUTPATIENT)
Dept: SLEEP MEDICINE | Facility: HOSPITAL | Age: 74
End: 2024-12-24
Payer: MEDICARE

## 2024-12-24 NOTE — TELEPHONE ENCOUNTER
----- Message from George Phelan sent at 12/18/2024  2:09 PM EST -----  Regarding: RE: Iron def  Stephon, Thanks for letting me know.  I am usually a little hesitant to give IV iron in the ferritins this high but if he is having severe issues with restless leg syndrome, I think it would be reasonable.    Yolanda, please call patient and tell him Dr. Sommers contacted me and informed me patient's restless legs are significant.  I agree, restless legs can be more problematic when there is iron deficiency.  I think he should have some IV iron knowing he is having significant issues with restless legs    Yolanda, please arrange 50% of the calculated dose of INFeD  (Not using 100% of the calculated dose because ferritin is already >500).  ----- Message -----  From: Stephon Sommers MD  Sent: 12/18/2024   1:53 PM EST  To: George Phelan II, MD  Subject: Iron def                                         Jose Doran has severe RLS and now taking 2 mg of pramipexole qhs. This gets worse with iron def. Any thoughts of replacing iron ?    Stephon

## 2024-12-26 ENCOUNTER — TELEPHONE (OUTPATIENT)
Dept: GASTROENTEROLOGY | Facility: CLINIC | Age: 74
End: 2024-12-26
Payer: MEDICARE

## 2024-12-26 DIAGNOSIS — R11.0 NAUSEA: ICD-10-CM

## 2024-12-26 DIAGNOSIS — K25.9 GASTRIC ULCER WITHOUT HEMORRHAGE OR PERFORATION, UNSPECIFIED CHRONICITY: Primary | ICD-10-CM

## 2024-12-26 DIAGNOSIS — K31.84 GASTROPARESIS: ICD-10-CM

## 2024-12-26 DIAGNOSIS — K22.70 BARRETT'S ESOPHAGUS WITHOUT DYSPLASIA: ICD-10-CM

## 2024-12-26 NOTE — TELEPHONE ENCOUNTER
----- Message from Mulugeta Millan sent at 12/22/2024  2:26 PM EST -----  Regarding: Biopsy results  Okay to call results, with evidence of fungal infection and ulcer would encourage Diflucan 100 mg to be taken daily for 3 weeks and also consider Carafate suspension 1 g with meals and at bedtime.  He warrants follow-up endoscopy to assess ulcer healing in 3 months time.  ----- Message -----  From: Lab, Background User  Sent: 12/13/2024   1:30 PM EST  To: Mulugeta BLACKWELL MD    Final Diagnosis   1.  Esophagus-gastric anastomosis at 28 cm biopsy: Predominantly glandular mucosa and scant squamous mucosa with               A.  Active moderate chronic inflammation and detached necroinflammatory debris consistent with ulceration.                 B.  No intestinal metaplasia identified by routine staining (see comment).               C.  Rare fungal organisms identified by special staining (see comment).  D.  Inflammatory atypia noted, no dysplasia nor malignancy identified.

## 2024-12-26 NOTE — TELEPHONE ENCOUNTER
Called pt and left vm for pt to call back.     When attempting to escribe diflucan a warning came advising to not take diflucan when taking zofran due prolonged qt interval.  When we reach pt we will need to advise them to not take zofran when taking diflucan.

## 2024-12-30 ENCOUNTER — TELEPHONE (OUTPATIENT)
Dept: GASTROENTEROLOGY | Facility: CLINIC | Age: 74
End: 2024-12-30
Payer: MEDICARE

## 2024-12-30 PROBLEM — K22.70 BARRETT'S ESOPHAGUS WITHOUT DYSPLASIA: Status: ACTIVE | Noted: 2024-12-26

## 2024-12-30 PROBLEM — K25.9 GASTRIC ULCER WITHOUT HEMORRHAGE OR PERFORATION: Status: ACTIVE | Noted: 2024-12-26

## 2024-12-30 RX ORDER — FLUCONAZOLE 100 MG/1
100 TABLET ORAL DAILY
Qty: 21 TABLET | Refills: 0 | Status: SHIPPED | OUTPATIENT
Start: 2024-12-30

## 2024-12-30 RX ORDER — SUCRALFATE 1 G/1
1 TABLET ORAL
Qty: 120 TABLET | Refills: 2 | Status: SHIPPED | OUTPATIENT
Start: 2024-12-30

## 2024-12-30 NOTE — TELEPHONE ENCOUNTER
Addended by: ZAKIYA CHUN on: 10/18/2018 09:09 AM     Modules accepted: Orders     Called pt and advised of below path results and Dr Millan's recommendations.  Advised pt to not take zofran while taking the diflucan due to potential drug interaction. Pt verb understanding.     Sucralfate and diflucan escribed to pt's pharmacy as ordered below.     Case request for repeat egd placed. Egd due after march 12th.  Message sent to Fang in scheduling.

## 2024-12-30 NOTE — TELEPHONE ENCOUNTER
Hub staff attempted to follow warm transfer process and was unsuccessful     Caller: Jose Hurtado    Relationship to patient: Self    Best call back number: [phone]     Patient is needing: PT IS WANTING TO SPEAK WITH GERTRUDIS ABOUT PROCEDURE, HAD SOME QUESTIONS

## 2024-12-30 NOTE — TELEPHONE ENCOUNTER
BETTINA VARGHESE IN SCHEDULING PT SCHEDULED 03/20/2025 ARRIVING AT 0700AM EGD PREP INFORMATION MAILED TO ADDRESS ON FILE VERIFIED BY PT OK FOR HUB TO RELAY

## 2025-01-27 NOTE — ASSESSMENT & PLAN NOTE
----- Message from AMADO Mcnulty sent at 1/25/2025  3:37 PM EST -----  Didn't realized this was the sibling to Nadeem that I also just sent a task and called and left another VM message for the parent about his POS flu A results from ER visit on 1/24/25.  Reminded about supportive therapy and f/u prn.  ----- Message -----  From: Arun Jean MD  Sent: 1/25/2025  12:03 PM EST  To: Sarah Heller PA-C    Influenza a positive in the ED January 24, 2025   Continue furosemide 40 mg twice daily.

## 2025-02-24 ENCOUNTER — OFFICE VISIT (OUTPATIENT)
Dept: INTERNAL MEDICINE | Age: 75
End: 2025-02-24
Payer: MEDICARE

## 2025-02-24 VITALS
HEIGHT: 76 IN | DIASTOLIC BLOOD PRESSURE: 70 MMHG | SYSTOLIC BLOOD PRESSURE: 139 MMHG | WEIGHT: 155 LBS | TEMPERATURE: 97.1 F | HEART RATE: 65 BPM | OXYGEN SATURATION: 97 % | BODY MASS INDEX: 18.88 KG/M2

## 2025-02-24 DIAGNOSIS — T83.511A URINARY TRACT INFECTION ASSOCIATED WITH CATHETERIZATION OF URINARY TRACT, UNSPECIFIED INDWELLING URINARY CATHETER TYPE, INITIAL ENCOUNTER: ICD-10-CM

## 2025-02-24 DIAGNOSIS — R53.83 FATIGUE, UNSPECIFIED TYPE: Primary | ICD-10-CM

## 2025-02-24 DIAGNOSIS — F33.41 RECURRENT MAJOR DEPRESSIVE DISORDER, IN PARTIAL REMISSION: Chronic | ICD-10-CM

## 2025-02-24 DIAGNOSIS — N39.0 URINARY TRACT INFECTION ASSOCIATED WITH CATHETERIZATION OF URINARY TRACT, UNSPECIFIED INDWELLING URINARY CATHETER TYPE, INITIAL ENCOUNTER: ICD-10-CM

## 2025-02-24 PROBLEM — J40 BRONCHITIS: Status: RESOLVED | Noted: 2022-05-13 | Resolved: 2025-02-24

## 2025-02-24 LAB
ALBUMIN SERPL-MCNC: 3.6 G/DL (ref 3.5–5.2)
ALBUMIN/GLOB SERPL: 1.1 G/DL
ALP SERPL-CCNC: 137 U/L (ref 39–117)
ALT SERPL-CCNC: 10 U/L (ref 1–41)
AST SERPL-CCNC: 19 U/L (ref 1–40)
BASOPHILS # BLD AUTO: 0.04 10*3/MM3 (ref 0–0.2)
BASOPHILS NFR BLD AUTO: 0.9 % (ref 0–1.5)
BILIRUB BLD-MCNC: NEGATIVE MG/DL
BILIRUB SERPL-MCNC: 0.5 MG/DL (ref 0–1.2)
BUN SERPL-MCNC: 22 MG/DL (ref 8–23)
BUN/CREAT SERPL: 15.6 (ref 7–25)
CALCIUM SERPL-MCNC: 9.2 MG/DL (ref 8.6–10.5)
CHLORIDE SERPL-SCNC: 102 MMOL/L (ref 98–107)
CLARITY, POC: CLEAR
CO2 SERPL-SCNC: 24.4 MMOL/L (ref 22–29)
COLOR UR: YELLOW
CREAT SERPL-MCNC: 1.41 MG/DL (ref 0.76–1.27)
EGFRCR SERPLBLD CKD-EPI 2021: 51.3 ML/MIN/1.73
EOSINOPHIL # BLD AUTO: 0.09 10*3/MM3 (ref 0–0.4)
EOSINOPHIL NFR BLD AUTO: 2 % (ref 0.3–6.2)
ERYTHROCYTE [DISTWIDTH] IN BLOOD BY AUTOMATED COUNT: 13.5 % (ref 12.3–15.4)
EXPIRATION DATE: ABNORMAL
GLOBULIN SER CALC-MCNC: 3.3 GM/DL
GLUCOSE SERPL-MCNC: 97 MG/DL (ref 65–99)
GLUCOSE UR STRIP-MCNC: NEGATIVE MG/DL
HCT VFR BLD AUTO: 35.8 % (ref 37.5–51)
HGB BLD-MCNC: 11.9 G/DL (ref 13–17.7)
IMM GRANULOCYTES # BLD AUTO: 0.01 10*3/MM3 (ref 0–0.05)
IMM GRANULOCYTES NFR BLD AUTO: 0.2 % (ref 0–0.5)
KETONES UR QL: ABNORMAL
LEUKOCYTE EST, POC: ABNORMAL
LYMPHOCYTES # BLD AUTO: 1.25 10*3/MM3 (ref 0.7–3.1)
LYMPHOCYTES NFR BLD AUTO: 27.1 % (ref 19.6–45.3)
Lab: ABNORMAL
MCH RBC QN AUTO: 29.4 PG (ref 26.6–33)
MCHC RBC AUTO-ENTMCNC: 33.2 G/DL (ref 31.5–35.7)
MCV RBC AUTO: 88.4 FL (ref 79–97)
MONOCYTES # BLD AUTO: 0.37 10*3/MM3 (ref 0.1–0.9)
MONOCYTES NFR BLD AUTO: 8 % (ref 5–12)
NEUTROPHILS # BLD AUTO: 2.85 10*3/MM3 (ref 1.7–7)
NEUTROPHILS NFR BLD AUTO: 61.8 % (ref 42.7–76)
NITRITE UR-MCNC: POSITIVE MG/ML
NRBC BLD AUTO-RTO: 0 /100 WBC (ref 0–0.2)
PH UR: 5.5 [PH] (ref 5–8)
PLATELET # BLD AUTO: 112 10*3/MM3 (ref 140–450)
POTASSIUM SERPL-SCNC: 4.2 MMOL/L (ref 3.5–5.2)
PROT SERPL-MCNC: 6.9 G/DL (ref 6–8.5)
PROT UR STRIP-MCNC: ABNORMAL MG/DL
RBC # BLD AUTO: 4.05 10*6/MM3 (ref 4.14–5.8)
RBC # UR STRIP: ABNORMAL /UL
SODIUM SERPL-SCNC: 138 MMOL/L (ref 136–145)
SP GR UR: 1.02 (ref 1–1.03)
T4 FREE SERPL-MCNC: 1.45 NG/DL (ref 0.92–1.68)
TSH SERPL DL<=0.005 MIU/L-ACNC: 1.85 UIU/ML (ref 0.27–4.2)
UROBILINOGEN UR QL: ABNORMAL
WBC # BLD AUTO: 4.61 10*3/MM3 (ref 3.4–10.8)

## 2025-02-24 PROCEDURE — 81003 URINALYSIS AUTO W/O SCOPE: CPT | Performed by: INTERNAL MEDICINE

## 2025-02-24 PROCEDURE — 1160F RVW MEDS BY RX/DR IN RCRD: CPT | Performed by: INTERNAL MEDICINE

## 2025-02-24 PROCEDURE — 1159F MED LIST DOCD IN RCRD: CPT | Performed by: INTERNAL MEDICINE

## 2025-02-24 PROCEDURE — 3078F DIAST BP <80 MM HG: CPT | Performed by: INTERNAL MEDICINE

## 2025-02-24 PROCEDURE — 1126F AMNT PAIN NOTED NONE PRSNT: CPT | Performed by: INTERNAL MEDICINE

## 2025-02-24 PROCEDURE — 99214 OFFICE O/P EST MOD 30 MIN: CPT | Performed by: INTERNAL MEDICINE

## 2025-02-24 PROCEDURE — 3075F SYST BP GE 130 - 139MM HG: CPT | Performed by: INTERNAL MEDICINE

## 2025-02-24 RX ORDER — NITROFURANTOIN 25; 75 MG/1; MG/1
100 CAPSULE ORAL 2 TIMES DAILY
Qty: 14 CAPSULE | Refills: 0 | Status: SHIPPED | OUTPATIENT
Start: 2025-02-24 | End: 2025-03-03

## 2025-02-24 NOTE — PROGRESS NOTES
J  U  N  O  H    K  I  M ,   M  D      I  N  T  E  R  N  A  L    M  E  D  I  C  I  N  E         ENCOUNTER DATE:  02/24/2025    Jose Hurtado / 75 y.o. / male    OFFICE VISIT ENCOUNTER       CHIEF COMPLAINT / REASON FOR OFFICE VISIT     Fatigue (Increased x 2 weeks/? UTI )      ASSESSMENT & PLAN     1. Fatigue, unspecified type    2. Urinary tract infection associated with catheterization of urinary tract, unspecified indwelling urinary catheter type, initial encounter    3. Recurrent major depressive disorder, in partial remission      Orders Placed This Encounter   Procedures    TSH+Free T4     Order Specific Question:   Release to patient     Answer:   Routine Release [[id number]]    Comprehensive Metabolic Panel     Order Specific Question:   Release to patient     Answer:   Routine Release [[id number]]    POC Urinalysis Dipstick, Automated     Order Specific Question:   Release to patient     Answer:   Routine Release [[id number]]    CBC & Differential     Order Specific Question:   Manual Differential     Answer:   No     Order Specific Question:   Release to patient     Answer:   Routine Release [[id number]]     New Medications Ordered This Visit   Medications    nitrofurantoin, macrocrystal-monohydrate, (Macrobid) 100 MG capsule     Sig: Take 1 capsule by mouth 2 (Two) Times a Day for 7 days.     Dispense:  14 capsule     Refill:  0       SUMMARY/DISCUSSION  Possible UTI (complicated due to self catherization)  Will treat with Macrobid x 7 days for now  Needs significant dietary improvement. Increase protein/carbohydrate intake. Supplement with Ensure/Boost twice daily.   Improve compliance with medication especially with fluoxetine for depression.  I advised him and his spouse to work together as he will need further supervision with medication administration as time goes on.  Patient is agreeable to this.  B12 injection MONTHLY (missed 2 months)      TOTAL TIME OF ENCOUNTER:        Next Appointment  "with me: 3/7/2025    Return in about 3 months (around 5/24/2025) for Reassess chronic medical problems.      VITAL SIGNS     Vitals:    02/24/25 1512   BP: 139/70   Pulse: 65   Temp: 97.1 °F (36.2 °C)   SpO2: 97%   Weight: 70.3 kg (155 lb)   Height: 193 cm (76\")       BP Readings from Last 3 Encounters:   02/24/25 139/70   12/12/24 124/75   12/06/24 106/64     Wt Readings from Last 3 Encounters:   02/24/25 70.3 kg (155 lb)   12/12/24 77.6 kg (171 lb)   12/06/24 78.9 kg (174 lb)     Body mass index is 18.87 kg/m².    Blood pressure readings recorded on patient flowsheet:       No data to display                MEDICATIONS AT THE TIME OF OFFICE VISIT     Current Outpatient Medications on File Prior to Visit   Medication Sig    acetaminophen (TYLENOL) 325 MG tablet Take 2 tablets by mouth Every 6 (Six) Hours As Needed for Mild Pain, Headache or Fever.    atorvastatin (LIPITOR) 40 MG tablet Take 1 tablet by mouth Every Night.    Budeson-Glycopyrrol-Formoterol (Breztri Aerosphere) 160-9-4.8 MCG/ACT aerosol inhaler Inhale 2 puffs 2 (Two) Times a Day.    cyanocobalamin 1000 MCG/ML injection Inject 1 mL under the skin into the appropriate area as directed Every 30 (Thirty) Days. Indications: Inadequate Vitamin B12    Eliquis 5 MG tablet tablet TAKE ONE TABLET BY MOUTH EVERY 12 HOURS    EPINEPHrine, Anaphylaxis, (ADRENALIN) 1 MG/ML injection Inject 0.3 mL into the appropriate muscle as directed by prescriber As Needed for Anaphylaxis. Indications: Life-Threatening Hypersensitivity Reaction    gabapentin (Neurontin) 100 MG capsule Take 1 capsule by mouth 3 (Three) Times a Day.    metoclopramide (Reglan) 5 MG tablet Take 1 tablet by mouth 2 (Two) Times a Day. You will need to schedule an office apt for further refills    mupirocin (BACTROBAN) 2 % ointment Apply  topically to the appropriate area as directed.    ondansetron ODT (ZOFRAN-ODT) 4 MG disintegrating tablet Place 1 tablet on the tongue Every 8 (Eight) Hours As Needed " for Nausea or Vomiting (for nausea and dry heaves).    PARoxetine (PAXIL) 40 MG tablet TAKE ONE TABLET BY MOUTH EVERY MORNING    polyethylene glycol (MIRALAX) 17 g packet Take 17 g by mouth Daily. (Patient taking differently: Take 17 g by mouth Daily.)    pramipexole (MIRAPEX) 1.5 MG tablet Take 1 tablet by mouth 2 (Two) Times a Day. Indications: Restless Leg Syndrome    Cholecalciferol (Vitamin D3) 50 MCG (2000 UT) capsule Take 1 capsule by mouth Daily. (Patient not taking: Reported on 2/24/2025)    fluconazole (Diflucan) 100 MG tablet Take 1 tablet by mouth Daily. Pt to hold zofran for 3 wks while take this med. (Patient not taking: Reported on 2/24/2025)    furosemide (LASIX) 20 MG tablet Take 1 tablet by mouth Daily. (Patient not taking: Reported on 2/24/2025)    sucralfate (Carafate) 1 g tablet Take 1 tablet by mouth 4 (Four) Times a Day With Meals & at Bedtime. (Patient not taking: Reported on 2/24/2025)     No current facility-administered medications on file prior to visit.          HISTORY OF PRESENT ILLNESS     Patient complains of 2 weeks history of subacute worsening fatigue.  He feels this may be related to a recurrent urinary tract infection as he has to catheterize himself 3 times a day.  He denies any fever, chills or flank pain.  Denies gross hematuria.  Denies dysuria or urgency/frequency.  He does complain of nonspecific suprapubic discomfort.  He has history of major depression but has compliance issues with taking his medication regularly.  Patient states that he perhaps takes the medicine only 25% of the time.  He has not been eating well at all and very much lives on high fructose soda pops.  Further weight loss is noted since his last visit.  He has not been giving himself his B12 shot regularly.  Last shot was more than 2 months ago.  He is scheduled to undergo repeat endoscopic studies as well as follow-up with hematologist.        REVIEW OF SYSTEMS             PHYSICAL EXAMINATION  "    Physical Exam  Alert with normal thought and judgment.   Weight loss noted   Appears frail   Cardiovascular: Normal rate, regular rhythm.   Pulm/Chest: Effort normal, breath sounds normal.   Abdomen: Soft and nontender.   Mild bilateral lower extremities edema       REVIEWED DATA     Labs:     Lab Results   Component Value Date     06/20/2024    K 4.5 06/20/2024    CALCIUM 8.4 (L) 06/20/2024    AST 19 11/14/2023    ALT 15 11/14/2023    BUN 22 06/20/2024    CREATININE 1.29 (H) 06/20/2024    CREATININE 1.05 11/17/2023    CREATININE 1.15 11/16/2023     Lab Results   Component Value Date    HGBA1C 5.50 08/06/2021    HGBA1C 6.20 (H) 01/17/2020     Lab Results   Component Value Date    LDL 47 06/23/2023    LDL 52 05/12/2023    LDL 98 06/02/2022    HDL 62 (H) 06/23/2023    HDL 52 05/12/2023    TRIG 36 06/23/2023    TRIG 49 05/12/2023     Lab Results   Component Value Date    TSH 1.600 05/12/2023    TSH 2.710 01/17/2020    TSH 2.050 06/06/2019    FREET4 1.36 05/12/2023    FREET4 1.52 01/17/2020    FREET4 1.18 06/06/2019     Lab Results   Component Value Date    WBC 5.91 12/06/2024    HGB 10.8 (L) 12/06/2024     12/06/2024     No results found for: \"MALBCRERATIO\"    Lab Results   Component Value Date    HGB 10.8 (L) 12/06/2024    HGB 11.6 (L) 08/02/2024    HGB 10.8 (L) 07/28/2024      Lab Results   Component Value Date    [id number] 557 08/02/2024      Lab Results   Component Value Date    FERRITIN 578.20 (H) 12/06/2024    FERRITIN 465 (H) 11/20/2024    FERRITIN 426.80 (H) 08/02/2024    IRONSERUM 25 (L) 06/23/2023    IRONSERUM 56 (L) 04/15/2019    LABIRON 9 (L) 12/06/2024    LABIRON 21 11/20/2024    LABIRON 18 (L) 08/02/2024    TIBC 267 (L) 12/06/2024    TIBC 219 (L) 11/20/2024    TIBC 282 (L) 08/02/2024      Imaging:           Medical Tests:           Summary of old records / correspondence / consultant report:           Request outside records:         "

## 2025-02-25 NOTE — PROGRESS NOTES
Mild anemia is improving. Platelets are little low. Discuss during your upcoming hematology follow-up.    Creatinine for kidney function is somewhat higher.  Maintain good hydration at all times.    Thyroid level is stable (or normal).

## 2025-03-04 ENCOUNTER — TELEPHONE (OUTPATIENT)
Dept: INTERNAL MEDICINE | Age: 75
End: 2025-03-04

## 2025-03-04 NOTE — TELEPHONE ENCOUNTER
Caller: Jose Hurtado    Relationship: Self    Best call back number: [phone]       Any additional details: PATIENT REPORTS CONSTANT BACK PAIN AND WOULD LIKE TO SEE A ORTHOPEDIC SPECIALIST.  PLEASE CALL TO FOLLOW UP

## 2025-03-05 ENCOUNTER — OFFICE VISIT (OUTPATIENT)
Dept: INTERNAL MEDICINE | Age: 75
End: 2025-03-05
Payer: MEDICARE

## 2025-03-05 ENCOUNTER — HOSPITAL ENCOUNTER (OUTPATIENT)
Facility: HOSPITAL | Age: 75
Discharge: HOME | End: 2025-03-05
Payer: MEDICARE

## 2025-03-05 ENCOUNTER — LAB (OUTPATIENT)
Facility: HOSPITAL | Age: 75
End: 2025-03-05
Payer: MEDICARE

## 2025-03-05 VITALS
SYSTOLIC BLOOD PRESSURE: 134 MMHG | OXYGEN SATURATION: 95 % | TEMPERATURE: 98.5 F | BODY MASS INDEX: 18.75 KG/M2 | DIASTOLIC BLOOD PRESSURE: 66 MMHG | HEIGHT: 76 IN | HEART RATE: 74 BPM | WEIGHT: 154 LBS

## 2025-03-05 DIAGNOSIS — T83.511D URINARY TRACT INFECTION ASSOCIATED WITH CATHETERIZATION OF URINARY TRACT, UNSPECIFIED INDWELLING URINARY CATHETER TYPE, SUBSEQUENT ENCOUNTER: Primary | ICD-10-CM

## 2025-03-05 DIAGNOSIS — M54.16 LUMBAR RADICULOPATHY, ACUTE: ICD-10-CM

## 2025-03-05 DIAGNOSIS — M21.372 FOOT DROP, LEFT: Chronic | ICD-10-CM

## 2025-03-05 DIAGNOSIS — N39.0 URINARY TRACT INFECTION ASSOCIATED WITH CATHETERIZATION OF URINARY TRACT, UNSPECIFIED INDWELLING URINARY CATHETER TYPE, SUBSEQUENT ENCOUNTER: Primary | ICD-10-CM

## 2025-03-05 DIAGNOSIS — D50.9 IRON DEFICIENCY ANEMIA, UNSPECIFIED IRON DEFICIENCY ANEMIA TYPE: ICD-10-CM

## 2025-03-05 LAB
BACTERIA UR QL AUTO: ABNORMAL /HPF
BILIRUB BLD-MCNC: NEGATIVE MG/DL
BILIRUB UR QL STRIP: NEGATIVE
CLARITY UR: ABNORMAL
CLARITY, POC: CLEAR
COLOR UR: YELLOW
COLOR UR: YELLOW
EXPIRATION DATE: ABNORMAL
GLUCOSE UR STRIP-MCNC: ABNORMAL MG/DL
GLUCOSE UR STRIP-MCNC: ABNORMAL MG/DL
HGB UR QL STRIP.AUTO: ABNORMAL
HOLD SPECIMEN: NORMAL
HYALINE CASTS UR QL AUTO: ABNORMAL /LPF
KETONES UR QL STRIP: NEGATIVE
KETONES UR QL: NEGATIVE
LEUKOCYTE EST, POC: ABNORMAL
LEUKOCYTE ESTERASE UR QL STRIP.AUTO: ABNORMAL
Lab: ABNORMAL
NITRITE UR QL STRIP: POSITIVE
NITRITE UR-MCNC: POSITIVE MG/ML
PH UR STRIP.AUTO: 6 [PH] (ref 5–8)
PH UR: 5.5 [PH] (ref 5–8)
PROT UR QL STRIP: ABNORMAL
PROT UR STRIP-MCNC: ABNORMAL MG/DL
RBC # UR STRIP: ABNORMAL /HPF
RBC # UR STRIP: ABNORMAL /UL
REF LAB TEST METHOD: ABNORMAL
SP GR UR STRIP: 1.02 (ref 1–1.03)
SP GR UR: 1.02 (ref 1–1.03)
SQUAMOUS #/AREA URNS HPF: ABNORMAL /HPF
UROBILINOGEN UR QL STRIP: ABNORMAL
UROBILINOGEN UR QL: ABNORMAL
WBC # UR STRIP: ABNORMAL /HPF

## 2025-03-05 PROCEDURE — 72110 X-RAY EXAM L-2 SPINE 4/>VWS: CPT

## 2025-03-05 PROCEDURE — 81001 URINALYSIS AUTO W/SCOPE: CPT | Performed by: PHYSICIAN ASSISTANT

## 2025-03-05 RX ORDER — CYCLOBENZAPRINE HCL 5 MG
5 TABLET ORAL 3 TIMES DAILY PRN
Qty: 30 TABLET | Refills: 0 | Status: SHIPPED | OUTPATIENT
Start: 2025-03-05

## 2025-03-05 RX ORDER — NITROFURANTOIN 25; 75 MG/1; MG/1
100 CAPSULE ORAL 2 TIMES DAILY
Qty: 14 CAPSULE | Refills: 0 | Status: SHIPPED | OUTPATIENT
Start: 2025-03-05

## 2025-03-05 RX ORDER — AMOXICILLIN AND CLAVULANATE POTASSIUM 562.5; 437.5; 62.5 MG/1; MG/1; MG/1
1 TABLET, MULTILAYER, EXTENDED RELEASE ORAL 2 TIMES DAILY
COMMUNITY
Start: 2024-12-19 | End: 2025-03-05

## 2025-03-05 RX ORDER — ALBUTEROL SULFATE 90 UG/1
2 INHALANT RESPIRATORY (INHALATION)
COMMUNITY
Start: 2024-12-18

## 2025-03-05 NOTE — PROGRESS NOTES
JESUS LANDIS PA-C                  I  N  T  E  R  N  A  L    M  E  D  I  C  I  N  E         ENCOUNTER DATE:  03/05/2025    Jose Hurtado / 75 y.o. / male    OFFICE VISIT ENCOUNTER       CHIEF COMPLAINT / REASON FOR OFFICE VISIT     Back Problem      ASSESSMENT & PLAN     Problem List Items Addressed This Visit    None  Visit Diagnoses       Urinary tract infection associated with catheterization of urinary tract, unspecified indwelling urinary catheter type, subsequent encounter    -  Primary    Relevant Medications    amoxicillin-clavulanate XR (AUGMENTIN XR) 1000-62.5 MG per 12 hr tablet    nitrofurantoin, macrocrystal-monohydrate, (Macrobid) 100 MG capsule    Other Relevant Orders    POCT urinalysis dipstick, automated (Completed)    Urinalysis With Culture If Indicated - Urine, Clean Catch (Completed)    [place] Urine Culture Tube - Urine, Clean Catch (Completed)    Urinalysis, Microscopic Only - Urine, Clean Catch (Completed)    Lumbar radiculopathy, acute        Relevant Medications    cyclobenzaprine (FLEXERIL) 5 MG tablet    Other Relevant Orders    XR Spine Lumbar 4+ View    Foot drop, left  (Chronic)             Orders Placed This Encounter   Procedures    XR Spine Lumbar 4+ View     Standing Status:   Future     Number of Occurrences:   1     Standing Expiration Date:   3/5/2026     Order Specific Question:   Reason for Exam:     Answer:   Lumbar pain with radiculopathy     Order Specific Question:   Does this patient have a diabetic monitoring/medication delivering device on?     Answer:   No     Order Specific Question:   Release to patient     Answer:   Routine Release [[id number]]    Urinalysis With Culture If Indicated - Urine, Clean Catch     Order Specific Question:   Release to patient     Answer:   Routine Release [[id number]]    [place] Urine Culture Tube - Urine, Clean Catch     Order Specific Question:   Release to patient     Answer:   Routine Release  "[[id number]]    Urinalysis, Microscopic Only - Urine, Clean Catch     Order Specific Question:   Release to patient     Answer:   Routine Release [[id number]]    POCT urinalysis dipstick, automated     Order Specific Question:   Release to patient     Answer:   Routine Release [[id number]]     New Medications Ordered This Visit   Medications    cyclobenzaprine (FLEXERIL) 5 MG tablet     Sig: Take 1 tablet by mouth 3 (Three) Times a Day As Needed for Muscle Spasms.     Dispense:  30 tablet     Refill:  0    nitrofurantoin, macrocrystal-monohydrate, (Macrobid) 100 MG capsule     Sig: Take 1 capsule by mouth 2 (Two) Times a Day.     Dispense:  14 capsule     Refill:  0       SUMMARY/DISCUSSION  X-ray of Lumbar spine ordered to be completed on today. Lumbar MRI likely to be ordered due to radiculopathy and left foot-drop.   Start trial of cyclobenzaprin 5 mg TID prn.   Continue Tylenol as needed, and avoid NSAIDs due to CKD.   POCT Urine dipstick positive for Nitrites, large blood, small leukocytes and glucose > 100 mg. Culture pending.   Refill Macrobid 100 mg BID x 7 days.   I am not this patient's primary care provider, as she is followed by Dr. Brandin Bolton here at the [facility] location.  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 Bolton.  It may be necessary for me to follow up with this patient on a ongoing basis concerning chronicity of this specific disease process, and for continuity of care.         Next Appointment:     Return in about 2 weeks (around 3/19/2025) for Recheck back pain with Dr. Bolton or myself.      VITAL SIGNS     Vitals:    03/05/25 1304   BP: 134/66   BP Location: Right arm   Patient Position: Sitting   Cuff Size: Adult   Pulse: 74   Temp: 98.5 °F (36.9 °C)   TempSrc: Temporal   SpO2: 95%   Weight: 69.9 kg (154 lb)   Height: 193 cm (75.98\")       BP Readings from Last 3 Encounters:   03/05/25 134/66   02/24/25 139/70 "   12/12/24 124/75     Wt Readings from Last 3 Encounters:   03/05/25 69.9 kg (154 lb)   02/24/25 70.3 kg (155 lb)   12/12/24 77.6 kg (171 lb)     Body mass index is 18.75 kg/m².         No data to display                   MEDICATIONS AT THE TIME OF OFFICE VISIT     Current Outpatient Medications on File Prior to Visit   Medication Sig    acetaminophen (TYLENOL) 325 MG tablet Take 2 tablets by mouth Every 6 (Six) Hours As Needed for Mild Pain, Headache or Fever.    albuterol sulfate  (90 Base) MCG/ACT inhaler Inhale 2 puffs 4 (Four) Times a Day.    atorvastatin (LIPITOR) 40 MG tablet Take 1 tablet by mouth Every Night.    Budeson-Glycopyrrol-Formoterol (Breztri Aerosphere) 160-9-4.8 MCG/ACT aerosol inhaler Inhale 2 puffs 2 (Two) Times a Day.    Cholecalciferol (Vitamin D3) 50 MCG (2000 UT) capsule Take 1 capsule by mouth Daily.    cyanocobalamin 1000 MCG/ML injection Inject 1 mL under the skin into the appropriate area as directed Every 30 (Thirty) Days. Indications: Inadequate Vitamin B12    Eliquis 5 MG tablet tablet TAKE ONE TABLET BY MOUTH EVERY 12 HOURS    gabapentin (Neurontin) 100 MG capsule Take 1 capsule by mouth 3 (Three) Times a Day.    metoclopramide (Reglan) 5 MG tablet Take 1 tablet by mouth 2 (Two) Times a Day. You will need to schedule an office apt for further refills    PARoxetine (PAXIL) 40 MG tablet TAKE ONE TABLET BY MOUTH EVERY MORNING    polyethylene glycol (MIRALAX) 17 g packet Take 17 g by mouth Daily. (Patient taking differently: Take 17 g by mouth Daily.)    pramipexole (MIRAPEX) 1.5 MG tablet Take 1 tablet by mouth 2 (Two) Times a Day. Indications: Restless Leg Syndrome    amoxicillin-clavulanate XR (AUGMENTIN XR) 1000-62.5 MG per 12 hr tablet Take 1 tablet by mouth 2 (Two) Times a Day. (Patient not taking: Reported on 3/5/2025)    EPINEPHrine, Anaphylaxis, (ADRENALIN) 1 MG/ML injection Inject 0.3 mL into the appropriate muscle as directed by prescriber As Needed for Anaphylaxis.  Indications: Life-Threatening Hypersensitivity Reaction (Patient not taking: Reported on 3/5/2025)    fluconazole (Diflucan) 100 MG tablet Take 1 tablet by mouth Daily. Pt to hold zofran for 3 wks while take this med. (Patient not taking: Reported on 3/5/2025)    furosemide (LASIX) 20 MG tablet Take 1 tablet by mouth Daily. (Patient not taking: Reported on 3/5/2025)    mupirocin (BACTROBAN) 2 % ointment Apply  topically to the appropriate area as directed. (Patient not taking: Reported on 3/5/2025)    ondansetron ODT (ZOFRAN-ODT) 4 MG disintegrating tablet Place 1 tablet on the tongue Every 8 (Eight) Hours As Needed for Nausea or Vomiting (for nausea and dry heaves). (Patient not taking: Reported on 3/5/2025)    sucralfate (Carafate) 1 g tablet Take 1 tablet by mouth 4 (Four) Times a Day With Meals & at Bedtime. (Patient not taking: Reported on 3/5/2025)     No current facility-administered medications on file prior to visit.          HISTORY OF PRESENT ILLNESS     Reports chronic, but worsening lower back pain over the last month. He states that his pain worsens when attempting to stand up straight. Pain is stabbing in quality, localized to his mid-spine, and rated 7/10. There is radiation of his pain that travels laterally across his back. He also admits some foot-drop to his left foot with walking. Pain is improved when seated or lying down. Denies trauma, saddle paresthesia, or bowel changes. He has attempted physical therapy in the past, back braces, chiropractic therapy, massage therapy.     Patient Care Team:  Brandin Bolton MD as PCP - General (Internal Medicine)  Tapan, George THORPE II, MD as Consulting Physician (Hematology and Oncology)  Jose Inman MD as Consulting Physician (Urology)  Mulugeta Millan MD as Consulting Physician (Gastroenterology)  Emmett, Jose MCINTOSH III, MD as Referring Physician (Thoracic Surgery)  Seth Randhawa MD as Consulting Physician (Ophthalmology)  James Cyr  MD as Consulting Physician (Cardiology)  Stephon Sommers MD as Consulting Physician (Sleep Medicine)  Rolanda Solomon MD as Consulting Physician (Nephrology)    REVIEW OF SYSTEMS     Review of Systems   As Per HPI.  All other systems negative.     PHYSICAL EXAMINATION     Physical Exam  Vitals and nursing note reviewed.   Constitutional:       General: He is not in acute distress.     Appearance: Normal appearance. He is not ill-appearing.   Cardiovascular:      Rate and Rhythm: Normal rate and regular rhythm.      Pulses: Normal pulses.      Heart sounds: Normal heart sounds. No murmur heard.     No friction rub. No gallop.   Pulmonary:      Effort: Pulmonary effort is normal. No respiratory distress.      Breath sounds: Normal breath sounds. No stridor. No wheezing.   Musculoskeletal:      Lumbar back: Spasms, tenderness and bony tenderness present. Decreased range of motion. Positive right straight leg raise test and positive left straight leg raise test.        Back:    Neurological:      Mental Status: He is alert.   Psychiatric:         Mood and Affect: Mood normal.         Behavior: Behavior normal.             REVIEWED DATA     Labs:     Lab Results   Component Value Date     02/24/2025    K 4.2 02/24/2025    CALCIUM 9.2 02/24/2025    AST 19 02/24/2025    ALT 10 02/24/2025    BUN 22 02/24/2025    CREATININE 1.41 (H) 02/24/2025    CREATININE 1.29 (H) 06/20/2024    CREATININE 1.05 11/17/2023     Lab Results   Component Value Date    HGBA1C 5.50 08/06/2021    HGBA1C 6.20 (H) 01/17/2020     Lab Results   Component Value Date    LDL 47 06/23/2023    LDL 52 05/12/2023    LDL 98 06/02/2022    HDL 62 (H) 06/23/2023    HDL 52 05/12/2023    TRIG 36 06/23/2023    TRIG 49 05/12/2023     Lab Results   Component Value Date    TSH 1.850 02/24/2025    TSH 1.600 05/12/2023    TSH 2.710 01/17/2020    FREET4 1.45 02/24/2025    FREET4 1.36 05/12/2023    FREET4 1.52 01/17/2020     Lab Results   Component Value Date    WBC  "4.61 02/24/2025    HGB 11.9 (L) 02/24/2025     (L) 02/24/2025   No results found for: \"MALBCRERATIO\"          Imaging:               Medical Tests:               Summary of old records / correspondence / consultant report:             Request outside records:       "

## 2025-03-07 DIAGNOSIS — R29.818 NEUROLOGIC DISORDER DUE TO DEGENERATION OF LUMBAR INTERVERTEBRAL DISC: Primary | ICD-10-CM

## 2025-03-07 DIAGNOSIS — M51.369 NEUROLOGIC DISORDER DUE TO DEGENERATION OF LUMBAR INTERVERTEBRAL DISC: Primary | ICD-10-CM

## 2025-03-08 DIAGNOSIS — N39.0 URINARY TRACT INFECTION ASSOCIATED WITH CATHETERIZATION OF URINARY TRACT, UNSPECIFIED INDWELLING URINARY CATHETER TYPE, SUBSEQUENT ENCOUNTER: Primary | ICD-10-CM

## 2025-03-08 DIAGNOSIS — T83.511D URINARY TRACT INFECTION ASSOCIATED WITH CATHETERIZATION OF URINARY TRACT, UNSPECIFIED INDWELLING URINARY CATHETER TYPE, SUBSEQUENT ENCOUNTER: Primary | ICD-10-CM

## 2025-03-08 LAB
BACTERIA UR CULT: ABNORMAL
BACTERIA UR CULT: ABNORMAL
OTHER ANTIBIOTIC SUSC ISLT: ABNORMAL

## 2025-03-12 DIAGNOSIS — F32.9 MAJOR DEPRESSIVE DISORDER WITH CURRENT ACTIVE EPISODE, UNSPECIFIED DEPRESSION EPISODE SEVERITY, UNSPECIFIED WHETHER RECURRENT: ICD-10-CM

## 2025-03-14 ENCOUNTER — OFFICE VISIT (OUTPATIENT)
Dept: ONCOLOGY | Facility: CLINIC | Age: 75
End: 2025-03-14
Payer: MEDICARE

## 2025-03-14 ENCOUNTER — LAB (OUTPATIENT)
Dept: OTHER | Facility: HOSPITAL | Age: 75
End: 2025-03-14
Payer: MEDICARE

## 2025-03-14 VITALS
HEIGHT: 76 IN | HEART RATE: 71 BPM | SYSTOLIC BLOOD PRESSURE: 148 MMHG | DIASTOLIC BLOOD PRESSURE: 73 MMHG | OXYGEN SATURATION: 99 % | BODY MASS INDEX: 20.7 KG/M2 | RESPIRATION RATE: 16 BRPM | WEIGHT: 170 LBS | TEMPERATURE: 97.3 F

## 2025-03-14 DIAGNOSIS — D50.9 IRON DEFICIENCY ANEMIA, UNSPECIFIED IRON DEFICIENCY ANEMIA TYPE: ICD-10-CM

## 2025-03-14 DIAGNOSIS — D50.9 IRON DEFICIENCY ANEMIA, UNSPECIFIED IRON DEFICIENCY ANEMIA TYPE: Primary | ICD-10-CM

## 2025-03-14 LAB
BASOPHILS # BLD AUTO: 0.03 10*3/MM3 (ref 0–0.2)
BASOPHILS NFR BLD AUTO: 0.8 % (ref 0–1.5)
DEPRECATED RDW RBC AUTO: 49.5 FL (ref 37–54)
EOSINOPHIL # BLD AUTO: 0.2 10*3/MM3 (ref 0–0.4)
EOSINOPHIL NFR BLD AUTO: 5.4 % (ref 0.3–6.2)
ERYTHROCYTE [DISTWIDTH] IN BLOOD BY AUTOMATED COUNT: 14.5 % (ref 12.3–15.4)
FERRITIN SERPL-MCNC: 396.9 NG/ML (ref 30–400)
HCT VFR BLD AUTO: 35.3 % (ref 37.5–51)
HGB BLD-MCNC: 10.7 G/DL (ref 13–17.7)
HGB RETIC QN AUTO: 30 PG (ref 29.8–36.1)
IMM GRANULOCYTES # BLD AUTO: 0 10*3/MM3 (ref 0–0.05)
IMM GRANULOCYTES NFR BLD AUTO: 0 % (ref 0–0.5)
IMM RETICS NFR: 7.7 % (ref 3–15.8)
IRON 24H UR-MRATE: 51 MCG/DL (ref 59–158)
IRON SATN MFR SERPL: 18 % (ref 20–50)
LYMPHOCYTES # BLD AUTO: 1.54 10*3/MM3 (ref 0.7–3.1)
LYMPHOCYTES NFR BLD AUTO: 41.5 % (ref 19.6–45.3)
MCH RBC QN AUTO: 28.7 PG (ref 26.6–33)
MCHC RBC AUTO-ENTMCNC: 30.3 G/DL (ref 31.5–35.7)
MCV RBC AUTO: 94.6 FL (ref 79–97)
MONOCYTES # BLD AUTO: 0.31 10*3/MM3 (ref 0.1–0.9)
MONOCYTES NFR BLD AUTO: 8.4 % (ref 5–12)
NEUTROPHILS NFR BLD AUTO: 1.63 10*3/MM3 (ref 1.7–7)
NEUTROPHILS NFR BLD AUTO: 43.9 % (ref 42.7–76)
NRBC BLD AUTO-RTO: 0 /100 WBC (ref 0–0.2)
PLATELET # BLD AUTO: 171 10*3/MM3 (ref 140–450)
PMV BLD AUTO: 9.2 FL (ref 6–12)
RBC # BLD AUTO: 3.73 10*6/MM3 (ref 4.14–5.8)
RETICS # AUTO: 0.02 10*6/MM3 (ref 0.02–0.13)
RETICS/RBC NFR AUTO: 0.61 % (ref 0.7–1.9)
TIBC SERPL-MCNC: 286 MCG/DL (ref 298–536)
TRANSFERRIN SERPL-MCNC: 192 MG/DL (ref 200–360)
VIT B12 BLD-MCNC: 384 PG/ML (ref 211–946)
WBC NRBC COR # BLD AUTO: 3.71 10*3/MM3 (ref 3.4–10.8)

## 2025-03-14 PROCEDURE — 36415 COLL VENOUS BLD VENIPUNCTURE: CPT

## 2025-03-14 PROCEDURE — 85046 RETICYTE/HGB CONCENTRATE: CPT | Performed by: INTERNAL MEDICINE

## 2025-03-14 PROCEDURE — 83540 ASSAY OF IRON: CPT | Performed by: INTERNAL MEDICINE

## 2025-03-14 PROCEDURE — 82728 ASSAY OF FERRITIN: CPT | Performed by: INTERNAL MEDICINE

## 2025-03-14 PROCEDURE — 82607 VITAMIN B-12: CPT | Performed by: INTERNAL MEDICINE

## 2025-03-14 PROCEDURE — 84466 ASSAY OF TRANSFERRIN: CPT | Performed by: INTERNAL MEDICINE

## 2025-03-14 PROCEDURE — 85025 COMPLETE CBC W/AUTO DIFF WBC: CPT | Performed by: INTERNAL MEDICINE

## 2025-03-14 RX ORDER — PAROXETINE 40 MG/1
40 TABLET, FILM COATED ORAL EVERY MORNING
Qty: 30 TABLET | Refills: 5 | Status: SHIPPED | OUTPATIENT
Start: 2025-03-14

## 2025-03-14 NOTE — PROGRESS NOTES
Subjective .     REASONS FOR FOLLOWUP:  Esophageal cancer, cytopenias     HISTORY OF PRESENT ILLNESS:  The patient is a 75 y.o. year old male  who is here for follow-up with the above-mentioned history.    States his restless legs is doing much better.  No bleeding.    Past Medical History:   Diagnosis Date    Acute deep vein thrombosis (DVT) of popliteal vein of left lower extremity 03/24/2020    Anemia     Anti-phospholipid syndrome     On lifelong anticoagulation therapy    Bladder disorder     LEAKAGE   ON MED  wers pads    Bleeding hemorrhoids     Chronic kidney disease     Community acquired pneumonia     HISTORY OF IN 2014    Congestive heart failure     COPD (chronic obstructive pulmonary disease)     Deep venous thrombosis 2006, 2008    Left lower extremity multiple    Depression     Esophageal carcinoma 12/31/2014    had chemo and radiation prior surgery    Gastroparesis     Hemorrhoids     HH (hiatus hernia)     History of atrial fibrillation 2015    ONE EPISODE WHILE HOSPITALIZED    History of kidney stones     History of nephrolithiasis     History of pancreatitis     PT STATES MANY YEARS AGO    History of radiation therapy     History of transfusion     Hypertension     Long-term (current) use of anticoagulants, INR goal 2.0-3.0     Lymphedema     Malignant neoplasm of prostate     Other hyperlipidemia 01/30/2018 January 30, 2018 lipid panel risk 12.8%    Restless legs syndrome     Sleep apnea     OCCASIONALLY WEARS CPAP    Squamous carcinoma     on the head     Past Surgical History:   Procedure Laterality Date    APPENDECTOMY  1950    BRONCHOSCOPY      (Diagnostic)    CARDIAC CATHETERIZATION N/A 05/14/2022    Procedure: Left Heart Cath;  Surgeon: Eric So MD;  Location: [facility] INVASIVE LOCATION;  Service: Cardiology;  Laterality: N/A;    CARDIAC CATHETERIZATION N/A 05/14/2022    Procedure: Coronary angiography;  Surgeon: Eric So MD;  Location: [facility]  INVASIVE LOCATION;  Service: Cardiology;  Laterality: N/A;    CARDIAC CATHETERIZATION N/A 05/14/2022    Procedure: Left ventriculography;  Surgeon: Eric So MD;  Location: [facility] INVASIVE LOCATION;  Service: Cardiology;  Laterality: N/A;    CATARACT EXTRACTION Bilateral 2014    COLONOSCOPY  12/15/2014    Complete / Description: EH, IH, torts, stool, follow-up colonoscopy due in 5 years.    COLONOSCOPY N/A 06/13/2017    non-thrombosed external hemorrhoids, normal examined ileum, IH    COLONOSCOPY N/A 02/26/2019    Procedure: COLONOSCOPY with Cold Polypectomy;  Surgeon: Mulugeta Millan MD;  Location: [facility] ENDOSCOPY;  Service: Gastroenterology    COLONOSCOPY N/A 12/16/2020    Procedure: COLONOSCOPY to cecum into TI;  Surgeon: Mesha Sanchez MD;  Location: [facility] ENDOSCOPY;  Service: Gastroenterology;  Laterality: N/A;  pre: lower GI bleed  post: hemorrhoids     CYSTOSCOPY W/ LASER LITHOTRIPSY      ENDOSCOPY N/A 06/13/2017    Procedure: ESOPHAGOGASTRODUODENOSCOPY WITH COLD BIOPSY;  Surgeon: Lake Gonzalez MD;  Location: [facility] ENDOSCOPY;  Service:     ENDOSCOPY N/A 11/18/2021    Procedure: ESOPHAGOGASTRODUODENOSCOPY WITH  DILATATION WITH FLUOROSCOPY;  Surgeon: Jose Christine III, MD;  Location: [facility] ENDOSCOPY;  Service: Gastroenterology;  Laterality: N/A;  Pre: dysphagia, h/x of adinocarcinoma of esophagus  Post: same    ENDOSCOPY N/A 08/07/2023    Procedure: ESOPHAGOGASTRODUODENOSCOPY;  Surgeon: Jameel Valencia MD;  Location: [facility] ENDOSCOPY;  Service: Gastroenterology;  Laterality: N/A;  PRE- DYSPHAGIA  POST-ABORTED DUE TO RETAINED FOOD    ENDOSCOPY N/A 08/08/2023    Procedure: ESOPHAGOGASTRODUODENOSCOPY with bx;  Surgeon: Jameel Valencia MD;  Location: [facility] ENDOSCOPY;  Service: Gastroenterology;  Laterality: N/A;  pre: N/V, abd pain  post: esophageal nodule, retained food    ENDOSCOPY N/A 12/12/2024    Procedure: ESOPHAGOGASTRODUODENOSCOPY with cold biopsies;  Surgeon:  Mulugeta Millan MD;  Location: [facility] ENDOSCOPY;  Service: Gastroenterology;  Laterality: N/A;  pre: Nausea, gastroparesis, history of esophageal cancer with pull-through, Pardo's esophagus  post: inflammation @GE junction anastamosis, retained gastric content    ESOPHAGECTOMY      April 2015, stage IIB esophageal carcinoma, sub-total resection.    ESOPHAGECTOMY      Esophagectomy Subtotal Andrea Joe Procedure    EXCISION LESION  08/2012    Removal of Squamous Cell CA on Head    HAMMER TOE REPAIR  09/2014    Hammertoe Operation (Each Toe), 10/2014    HAMMER TOE REPAIR Left 10/03/2017    Procedure: Left second third and fourth distal interphalangeal joint resection with flexor tenotomy;  Surgeon: Mulugeta Lira MD;  Location:  [facility];  Service:     HEMORRHOIDECTOMY N/A 12/23/2020    Procedure: HEMORRHOIDECTOMY;  Surgeon: Billy Julio Jr., MD;  Location: [facility] MAIN OR;  Service: General;  Laterality: N/A;    HERNIA REPAIR      incisional    JEJUNOSTOMY      Laparoscopic    JEJUNOSTOMY      tube removal     KNEE SURGERY Bilateral 1967, 1973, 1981    PATELLA SURGERY Left     removed    PILONIDAL CYST / SINUS EXCISION      TN ARTHRP KNE CONDYLE&PLATU MEDIAL&LAT COMPARTMENTS Right 03/26/2018    Procedure: TOTAL KNEE ARTHROPLASTY;  Surgeon: Renny Solis MD;  Location: [facility] MAIN OR;  Service: Orthopedics    PROSTATECTOMY  2010    PYLOROPLASTY      SIGMOIDOSCOPY N/A 08/02/2023    Procedure: SIGMOIDOSCOPY FLEXIBLE;  Surgeon: Mesha Sanchez MD;  Location: [facility] ENDOSCOPY;  Service: Gastroenterology;  Laterality: N/A;  PRE- ABNORMAL CT  POST- HEMORRHOIDS, ULCERATION    SPINAL FUSION  02/1998    C 5,6    TONSILLECTOMY      UPPER GASTROINTESTINAL ENDOSCOPY  12/15/2014    LA Grade D esophagitis, Pardo's, HH, multiple duodenal ulcers    VENTRAL/INCISIONAL HERNIA REPAIR N/A 04/14/2016    Procedure: VENTRAL/INCISIONAL HERNIA REPAIR, open, with mesh, and component separation;  Surgeon: Darren  Oni Rivas MD;  Location: [facility] OR;  Service:        HEMATOLOGIC/ONCOLOGIC HISTORY:  (History from previous dates can be found in the separate document.)    MEDICATIONS    Current Outpatient Medications:     acetaminophen (TYLENOL) 325 MG tablet, Take 2 tablets by mouth Every 6 (Six) Hours As Needed for Mild Pain, Headache or Fever., Disp: , Rfl:     albuterol sulfate  (90 Base) MCG/ACT inhaler, Inhale 2 puffs 4 (Four) Times a Day., Disp: , Rfl:     amoxicillin-clavulanate (AUGMENTIN) 875-125 MG per tablet, Take 1 tablet by mouth 2 (Two) Times a Day., Disp: 14 tablet, Rfl: 0    atorvastatin (LIPITOR) 40 MG tablet, Take 1 tablet by mouth Every Night., Disp: 90 tablet, Rfl: 2    Budeson-Glycopyrrol-Formoterol (Breztri Aerosphere) 160-9-4.8 MCG/ACT aerosol inhaler, Inhale 2 puffs 2 (Two) Times a Day., Disp: 10.7 g, Rfl: 5    Cholecalciferol (Vitamin D3) 50 MCG (2000 UT) capsule, Take 1 capsule by mouth Daily., Disp: , Rfl:     cyanocobalamin 1000 MCG/ML injection, Inject 1 mL under the skin into the appropriate area as directed Every 30 (Thirty) Days. Indications: Inadequate Vitamin B12, Disp: 4 mL, Rfl: 5    cyclobenzaprine (FLEXERIL) 5 MG tablet, Take 1 tablet by mouth 3 (Three) Times a Day As Needed for Muscle Spasms., Disp: 30 tablet, Rfl: 0    Eliquis 5 MG tablet tablet, TAKE ONE TABLET BY MOUTH EVERY 12 HOURS, Disp: 60 tablet, Rfl: 1    gabapentin (Neurontin) 100 MG capsule, Take 1 capsule by mouth 3 (Three) Times a Day., Disp: 90 capsule, Rfl: 2    metoclopramide (Reglan) 5 MG tablet, Take 1 tablet by mouth 2 (Two) Times a Day. You will need to schedule an office apt for further refills, Disp: 60 tablet, Rfl: 5    nitrofurantoin, macrocrystal-monohydrate, (Macrobid) 100 MG capsule, Take 1 capsule by mouth 2 (Two) Times a Day., Disp: 14 capsule, Rfl: 0    PARoxetine (PAXIL) 40 MG tablet, TAKE ONE TABLET BY MOUTH EVERY MORNING, Disp: 30 tablet, Rfl: 5    polyethylene glycol (MIRALAX) 17 g  "packet, Take 17 g by mouth Daily. (Patient taking differently: Take 17 g by mouth Daily.), Disp: 30 each, Rfl: 0    pramipexole (MIRAPEX) 1.5 MG tablet, Take 1 tablet by mouth 2 (Two) Times a Day. Indications: Restless Leg Syndrome, Disp: 180 tablet, Rfl: 1    sucralfate (Carafate) 1 g tablet, Take 1 tablet by mouth 4 (Four) Times a Day With Meals & at Bedtime., Disp: 120 tablet, Rfl: 2    ALLERGIES:     No Known Allergies      SOCIAL HISTORY:       Social History     Socioeconomic History    Marital status:      Spouse name: Lena    Number of children: 0    Years of education: College   Tobacco Use    Smoking status: Former     Current packs/day: 0.00     Average packs/day: 2.0 packs/day for 3.0 years (6.0 ttl pk-yrs)     Types: Cigarettes     Start date:      Quit date:      Years since quittin.2     Passive exposure: Past (father smoked in the home)    Smokeless tobacco: Former     Types: Chew    Tobacco comments:     Caffeine - coffee and soda    Vaping Use    Vaping status: Never Used   Substance and Sexual Activity    Alcohol use: Yes     Alcohol/week: 3.0 standard drinks of alcohol     Types: 1 Glasses of wine, 1 Cans of beer, 1 Shots of liquor per week     Comment: 2    Drug use: Never     Comment: hx marijuana use \"a long time ago\"    Sexual activity: Defer     Partners: Male     Birth control/protection: Contraceptive sponge         FAMILY HISTORY:  Family History   Problem Relation Age of Onset    Cerebral aneurysm Mother         cerebral artery aneurysm ( age 56)    Anxiety disorder Father     Suicide Attempts Father          of suicide    Cancer Father         bladder    Prostate cancer Brother 68    No Known Problems Brother     Pancreatic cancer Nephew     Colon cancer Neg Hx     Esophageal cancer Neg Hx     Dementia Neg Hx     Malig Hyperthermia Neg Hx        REVIEW OF SYSTEMS:    Review of Systems   Constitutional:  Negative for activity change.   HENT:  Negative for " "nosebleeds and trouble swallowing.    Respiratory:  Negative for wheezing.    Cardiovascular:  Negative for chest pain and palpitations.   Gastrointestinal:  Negative for constipation and diarrhea.   Genitourinary:  Negative for dysuria and hematuria.   Musculoskeletal:  Negative for arthralgias and myalgias.   Neurological:  Negative for seizures and syncope.   Hematological:  Negative for adenopathy. Does not bruise/bleed easily.   Psychiatric/Behavioral:  Negative for confusion.            Objective    Vitals:    03/14/25 1301   BP: 148/73   Pulse: 71   Resp: 16   Temp: 97.3 °F (36.3 °C)   TempSrc: Oral   SpO2: 99%   Weight: 77.1 kg (170 lb)   Height: 193 cm (75.98\")   PainSc: 0-No pain           3/14/2025     1:02 PM   Current Status   ECOG score 1      PHYSICAL EXAM:        CONSTITUTIONAL:  Vital signs reviewed.  No distress, looks comfortable.  EYES:  Conjunctiva and lids unremarkable.  PERRLA  EARS,NOSE,MOUTH,THROAT:  Ears and nose appear unremarkable.  Lips, teeth, gums appear unremarkable.  RESPIRATORY:  Normal respiratory effort.  Lungs clear to auscultation bilaterally.  CARDIOVASCULAR:  Normal S1, S2.  No murmurs rubs or gallops.  No change significant bilateral lower extremity edema  GASTROINTESTINAL: Abdomen appears unremarkable.  Nontender.  No hepatomegaly.  No splenomegaly.  LYMPHATIC:  No cervical, supraclavicular, axillary lymphadenopathy.  SKIN:  Warm.  No rashes.  PSYCHIATRIC:  Normal judgment and insight.  Normal mood and affect.           RECENT LABS:        WBC   Date/Time Value Ref Range Status   03/14/2025 12:34 PM 3.71 3.40 - 10.80 10*3/mm3 Final   02/24/2025 04:21 PM 4.61 3.40 - 10.80 10*3/mm3 Final   07/28/2024 09:10 PM 4.28 (L) 4.5 - 11.0 10*3/uL Final     Hemoglobin   Date/Time Value Ref Range Status   03/14/2025 12:34 PM 10.7 (L) 13.0 - 17.7 g/dL Final   07/28/2024 09:10 PM 10.8 (L) 13.5 - 17.5 g/dL Final     Platelets   Date/Time Value Ref Range Status   03/14/2025 12:34  140 - " 450 10*3/mm3 Final   07/28/2024 09:10  140 - 440 10*3/uL Final       Assessment/Plan     ASSESSMENT:  There are no diagnoses linked to this encounter.        *Esophageal adenocarcinoma. Initially T2N0M0. After neoadjuvant carboplatin/Taxol with radiation, achieved a pathologic CR at resection, 4/24/2015.   As per NCCN guidelines, plan CAT scans every 6 months x1 year, then every 6 to 9 months on years 2 and years 3. Defer any surveillance EGDs to Dr. Gonzalez if he feels they are appropriate.   Also as per NCCN guidelines, plan H and P every 3 to 6 months on years 1 and years 2, then every 6 to 12 months' time on years 3 through years 5 and then annually.   EGD 6/13/17 by Dr. Gonzalez: No evidence of recurrence.  CT scan 3/2/18 (this completed 3 years of surveillance CT): No evidence of recurrence.  Plan no more surveillance CT.  EGD 11/18/2021, Dr. Christine: No evidence of recurrent cancer.  Dilated.  No signs of recurrence.  Remaining in remission.    *Recurrent DVT, prior to cancer diagnosis.    Dr. Bolton previously managed his Coumadin.   Chronic left leg larger than right, since DVT  Acute left popliteal, gastrocnemius DVT on 3/24/2020 despite INR 3.4.  Therefore, changed to Eliquis.  On 3/25/2020, unremarkable: Lupus anticoagulant, beta-2 glycoprotein antibodies.  Anticardiolipin antibodies also felt to be unremarkable with IgG and IgM both at 15 (negative is <15.  Indeterminate is 15-20).  No signs of recurrent DVT.  Continuing on Eliquis.  No bleeding issues    *CT angiogram chest 3/24/2020 (LLE acute DVT found 3/24/2020): Mild increased opacity LLL may represent developing infiltrate versus atelectasis.  Radiologist recommended follow-up.  CT chest 5/1/2020: Resolution of groundglass LLL infiltrate from the 3/24/2020 CT.  A few mildly enlarged mediastinal nodes are less prominent than on the 3/18/2020 CT.  No new abnormalities.  Plan no more follow-up CT scans.    *Persistent cough.  He has seen Dr. Maher  Sayied for this.    He did not complain of this today.    *Previously complained of emesis occurring 5 hours after eating on average once every month or 2.  No nausea otherwise.  Denies dysphagia or odynophagia.    Unchanged.  Occurring on average once every couple of months.  He did not complain of this today    *Iron deficiency anemia  Hb mostly around 11  On 4/15/2019, ferritin 29.7, 13% saturation.  Serum folate normal.  On oral iron daily through PCP.  On 8/23/19, ferritin 65, 14%.   Increased oral iron.   On 10/15/19, ferritin 72, 10%.  On 10/25/2019, stopped oral iron since it was not helping.    Oral iron not effective due to poor absorption  2 doses Injectafer.  On 12/13/2019, ferritin 708, 15% saturation, Hb 10.4.  Therefore, no IV iron.  Monitor.  On 5/5/2020, ferritin 346, 15% saturation, Hb 9.9.  Therefore, no need for IV iron at this time.  On 7/7/2020, Hb up to 11.7.  Iron labs normal.  Admission 12/15/2020: Hb 6.6.  Ferritin 40, iron saturation 17%.  Discharged on 12/21/2020 with Hb 7.1, which fell from 7.9 on 12/18/2020, from 8.9 in the early morning 12/18/2020.  The drop in Hb was thought to be due to hemorrhoidal bleeding related to Eliquis.  Eliquis was stopped.  Hemorrhoidal repair planned by Dr. Flynn Julio after the holidays.  Was given Venofer 300 mg on 12/17/2020 by Dr. Ross of our practice  On 12/29/2020, ferritin 176, 13% saturation, Hb 8.4, reticulate hemoglobin low at 25.7.  Suspect he would benefit from some more iron.  Given 1 dose Injectafer.  On 2/2/2021, ferritin 317, 17% saturation, Hb 10.3.  Therefore, Hb gradually improved since the 1 dose of Injectafer.  3/2/2021: Ferritin iron 93, 11% saturation, Hb 11.9.  1 dose Injectafer.  3/23/2021: Ferritin 471, 20% saturation, Hb 10.8  7/6/2021: Ferritin 329, 21% saturation, Hb 10.8  9/28/2021: Ferritin 230, 20% saturation, Hb 11.4.  No need for IV iron.  12/28/2021: Ferritin 337, 6% iron saturation, Hb 10.4.  No IV iron given.   (Although saturation is low, ferritin is 337).  3/22/2022: Ferritin 112, 12% saturation, Hb 10.6.  Plan 1 dose Injectafer.  5/31/2022: Ferritin 473, 23% saturation, Hb 11.4  8/30/2022: Ferritin 308, 17% saturation, Hb 11.7.  No need for Injectafer  11/22/2022: Ferritin 268, 14% saturation.  Hb 11.9.  No need for Injectafer.  2/15/2023: Hb 11.  Ferritin 247, 19% saturation.  No need for Injectafer.  5/16/2023: Hb 12.  Ferritin 358, 15% saturation.  No need for Injectafer.  8/15/23: Hb 10.3  11/9/2023: Hb 9.4.  Ferritin 458, 8% saturation.  Arrange 1 dose Injectafer.  If insurance will not cover Injectafer then arrange Venofer 200 mg x 2 doses  1/16/2024: Hb 10, ferritin 234, 20% saturation  5/7/2024: Hb 11.9.  Ferritin 286, 10% saturation.  Venofer 200 mg x 3 doses  8/2/2024: Hb 11.6.  Ferritin 427, 18% saturation.  No need for Venofer  12/6/2024: Hb 10.8.  Ferritin 578, 9% saturation.  Reticulated Hb 31 await iron labs.  Although saturation is 9%, with ferritin 578.  I planned to avoid IV iron considering the ferritin >500.  12/18/2024: Dr. Sommers contacted me stating patient has restless legs and asked about patient receiving IV iron.  I ask our office to arrange 50% of the calculated dose of INFeD.  For some reason this did not occur  3/14/2025: Ferritin 397, 18% saturation, Hb 10.7.  No need for IV iron.  Restless legs are currently well-controlled and currently no need for IV iron    *Hemorrhoidal bleeding with Hb down to 6.6, requiring admission, 12/15/2020.  Thought to be related to Eliquis.  Eliquis was stopped.  Hemorrhoidal repair by Dr. Flynn Julio, 12/23/2020  Eliquis subsequently restarted with no more issues with bleeding.  1 episode of dark stools yesterday, 9/27/2021.  Told him if he notices more of this he should contact Dr. Sanchez.  3/22/2022: Denies any rectal bleeding.  States this has not really been an issue since the hemorrhoidal repair  8/30/2022: Denies bleeding  11/22/2022: Denies  bleeding  5/16/2023: Denies bleeding  11/9/2023: Denies bleeding    *9/28/2021: Change in stool frequency.  Was having a bowel movement 3 times per week.  For the past week has been having 3 formed bowel movements per day.  I told him if this persists he should discuss with Dr. Sanchez.    *Source of iron deficiency.  Last colonoscopy 2/26/2019 by Dr. Millan.  Last EGD 6/13/2017 by Dr. Gonzalez.  Suspect he has an absorption issue due to previous esophageal surgery.    *B12 deficiency.  On 6/6/2019, B12 <150  On B12 injections through PCP.  B12 on 5/5/2020 normal at 694  B12 on 2/2/2021, 789  5/16/2023 B12 1124  11/9/2023: B12 615  B12 557 on 8/2/2024    *Anemia for reasons in addition to iron deficiency and B12 deficiency  Baseline Hb mostly 10.5-11.5.  On 5/5/2020, unremarkable: RBC folate, iron labs, B12, haptoglobin, TB, LDH, direct Maki.  Creatinine baseline is 0.9-1.2   Hb 9.9 on 5/5/2020  On 7/7/2020, Hb up to 11.7.  Return to prior follow-up plan.  On 2/2/2021, B12 and RBC folate unremarkable  3/23/2021: Hb 10.8 despite normal iron stores.  5/25/2021, Hb 10.7 with normal iron labs  7/6/2021, Hb 10.8 with normal iron labs  Hb 10.7, from 11.6, from 11.9, from 10, from 9.4, from 10.3, from 12, from 11, from 10.2, from 11.9, from 11.7, from 11.4    *Leukocytopenia  New issue on 3/23/2021, WBC 3.38, based on recent labs, but WBC has been as low as 2.9 December 2019  WBC 3.7, from 5.9, from 5, from 4.8, from 3.9, from 6.4, from 4.6, from 3.9, from 4.3, from 4.1, from 4.6, from 4    *Neutropenia  ANC 2050, from 1590 on 3/23/2021 (previously ANC has been as low as 1480 with WBC in the low normal range)  ANC 1630, from 3520, from 2550, from 1480, from 4230, from 2910, from 2150, from 2160, from 2110, from 2560, from 1690    *Thrombocytopenia  New issue on 3/23/2021, , based on recent labs, but PLT has been as low as 119 October 2019  , from 172, from 160, from 158, from 167, from 274, from 201, from  180, from 163, from 155, from 174, from 158    *Dyspnea on exertion x2 weeks on 11/22/2022 visit  Advised to discuss with his PCP and in the meantime if it worsens he should go to the ER    *Admitted early January 2023 for respiratory failure due to RSV.  Also had A-fib with RVR.    *He had a white blood cell count in the upper 3s and a hemoglobin in the upper 12s with a normal platelet count prior to beginning chemotherapy.     PLAN:  MD CBC stat ferritin, iron panel, reticulate hemoglobin, B12 level 3 months  (I previously offered 6-month follow-up but he prefers to maintain 3-month)  Baseline Hb when not in the hospital mostly 9.5-11.5  Continue Eliquis  No need for Venofer currently    hemorrhoids have been repaired.  Last drop of Hb was down to 7.1 on 12/21/2020.  (Hemorrhoidal bleeding)  He states Dr. Millan is planning an EGD.      Prior plan was:  MD every 6-month.  No more surveillance CT scans.  He does not have a port.    I have discussed with him if Hb drops and remains around 9 or so, we may want to plan a bone marrow biopsy

## 2025-03-19 ENCOUNTER — OFFICE VISIT (OUTPATIENT)
Dept: INTERNAL MEDICINE | Age: 75
End: 2025-03-19
Payer: MEDICARE

## 2025-03-19 VITALS
SYSTOLIC BLOOD PRESSURE: 124 MMHG | DIASTOLIC BLOOD PRESSURE: 72 MMHG | OXYGEN SATURATION: 93 % | HEIGHT: 76 IN | WEIGHT: 168 LBS | TEMPERATURE: 97.4 F | BODY MASS INDEX: 20.46 KG/M2 | HEART RATE: 71 BPM

## 2025-03-19 DIAGNOSIS — T83.511D URINARY TRACT INFECTION ASSOCIATED WITH CATHETERIZATION OF URINARY TRACT, UNSPECIFIED INDWELLING URINARY CATHETER TYPE, SUBSEQUENT ENCOUNTER: ICD-10-CM

## 2025-03-19 DIAGNOSIS — M54.16 LUMBAR RADICULOPATHY, ACUTE: ICD-10-CM

## 2025-03-19 DIAGNOSIS — Z51.81 THERAPEUTIC DRUG MONITORING: Primary | ICD-10-CM

## 2025-03-19 DIAGNOSIS — N39.0 URINARY TRACT INFECTION ASSOCIATED WITH CATHETERIZATION OF URINARY TRACT, UNSPECIFIED INDWELLING URINARY CATHETER TYPE, SUBSEQUENT ENCOUNTER: ICD-10-CM

## 2025-03-19 RX ORDER — GABAPENTIN 300 MG/1
300 CAPSULE ORAL 2 TIMES DAILY
Qty: 60 CAPSULE | Refills: 0 | Status: SHIPPED | OUTPATIENT
Start: 2025-03-19

## 2025-03-19 NOTE — PROGRESS NOTES
JESUS LANDIS PA-C                  I  N  T  E  R  N  A  L    M  E  D  I  C  I  N  E         ENCOUNTER DATE:  03/19/2025    Jose Hurtado / 75 y.o. / male    OFFICE VISIT ENCOUNTER       CHIEF COMPLAINT / REASON FOR OFFICE VISIT     Back Pain (Pt states his back pain is about the same as last visit.)      ASSESSMENT & PLAN     Problem List Items Addressed This Visit    None  Visit Diagnoses         Therapeutic drug monitoring    -  Primary    Relevant Orders    Compliance Drug Analysis, Ur - Urine, Clean Catch    Drug Screen 11 w/Conf, Serum      Urinary tract infection associated with catheterization of urinary tract, unspecified indwelling urinary catheter type, subsequent encounter        Relevant Orders    Urinalysis With Culture If Indicated - Urine, Clean Catch      Lumbar radiculopathy, acute        Relevant Medications    gabapentin (NEURONTIN) 300 MG capsule          Orders Placed This Encounter   Procedures    Compliance Drug Analysis, Ur - Urine, Clean Catch     Release to patient:   Routine Release [[id number]]    Urinalysis With Culture If Indicated - Urine, Clean Catch     Release to patient:   Routine Release [[id number]]    Drug Screen 11 w/Conf, Serum     Release to patient:   Routine Release [[id number]]     New Medications Ordered This Visit   Medications    gabapentin (NEURONTIN) 300 MG capsule     Sig: Take 1 capsule by mouth 2 (Two) Times a Day.     Dispense:  60 capsule     Refill:  0       SUMMARY/DISCUSSION  Patient requesting guidance/instruction on how to operate his newly-prescribed TENS unit.   Increase Gabapentin to 300 mg from 100 mg as patient has not nerve pain relief.   Reviewed latest [facility] ('PDMP' tab) .  Most recent UDS and controlled medication contract reviewed concerning Gabapentin, and both updated on today. Patient is having medication refilled at expected intervals. Using medication appropriately without evidence of unusual increased use,  "abuse, or diverting.    Repeat Urinalysis as patient was unable to swallow large tablets of Augmentin. If UTI is present, and alternative therapy will be prescribed.   I am not this patient's primary care provider, as she is followed by Dr. Brandin Bolton here at the [facility] location.  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 Bloton.  It may be necessary for me to follow up with this patient on a ongoing basis concerning chronicity of this specific disease process, and for continuity of care.          Next Appointment:     Return in about 4 weeks (around 4/16/2025) for Follow-up with Dr. Bolton for back pain.      VITAL SIGNS     Vitals:    03/19/25 1137   BP: 124/72   Pulse: 71   Temp: 97.4 °F (36.3 °C)   SpO2: 93%   Weight: 76.2 kg (168 lb)   Height: 193 cm (76\")       BP Readings from Last 3 Encounters:   03/19/25 124/72   03/14/25 148/73   03/05/25 134/66     Wt Readings from Last 3 Encounters:   03/19/25 76.2 kg (168 lb)   03/14/25 77.1 kg (170 lb)   03/05/25 69.9 kg (154 lb)     Body mass index is 20.45 kg/m².         No data to display                   MEDICATIONS AT THE TIME OF OFFICE VISIT     Current Outpatient Medications on File Prior to Visit   Medication Sig    acetaminophen (TYLENOL) 325 MG tablet Take 2 tablets by mouth Every 6 (Six) Hours As Needed for Mild Pain, Headache or Fever.    albuterol sulfate  (90 Base) MCG/ACT inhaler Inhale 2 puffs 4 (Four) Times a Day.    atorvastatin (LIPITOR) 40 MG tablet Take 1 tablet by mouth Every Night.    Budeson-Glycopyrrol-Formoterol (Breztri Aerosphere) 160-9-4.8 MCG/ACT aerosol inhaler Inhale 2 puffs 2 (Two) Times a Day.    Cholecalciferol (Vitamin D3) 50 MCG (2000 UT) capsule Take 1 capsule by mouth Daily.    cyanocobalamin 1000 MCG/ML injection Inject 1 mL under the skin into the appropriate area as directed Every 30 (Thirty) Days. Indications: Inadequate Vitamin B12    " cyclobenzaprine (FLEXERIL) 5 MG tablet Take 1 tablet by mouth 3 (Three) Times a Day As Needed for Muscle Spasms.    Eliquis 5 MG tablet tablet TAKE ONE TABLET BY MOUTH EVERY 12 HOURS    metoclopramide (Reglan) 5 MG tablet Take 1 tablet by mouth 2 (Two) Times a Day. You will need to schedule an office apt for further refills    nitrofurantoin, macrocrystal-monohydrate, (Macrobid) 100 MG capsule Take 1 capsule by mouth 2 (Two) Times a Day.    PARoxetine (PAXIL) 40 MG tablet TAKE ONE TABLET BY MOUTH EVERY MORNING    polyethylene glycol (MIRALAX) 17 g packet Take 17 g by mouth Daily. (Patient taking differently: Take 17 g by mouth Daily.)    pramipexole (MIRAPEX) 1.5 MG tablet Take 1 tablet by mouth 2 (Two) Times a Day. Indications: Restless Leg Syndrome    sucralfate (Carafate) 1 g tablet Take 1 tablet by mouth 4 (Four) Times a Day With Meals & at Bedtime.     No current facility-administered medications on file prior to visit.          HISTORY OF PRESENT ILLNESS     Presents for a 2 week follow up concerning chronic, but worsening lower back pain over the last 6 weeks. He states that his pain worsens when attempting to stand up straight. Pain is stabbing in quality, localized to his mid-spine, and rated 7/10. There is radiation of his pain that travels laterally across his back. He also admits some foot-drop to his left foot with walking. Pain is improved when seated or lying down. Denies trauma, saddle paresthesia, or bowel changes. He has attempted physical therapy in the past, back braces, chiropractic therapy, massage therapy. X-Ray imaging of the lumbar spine was completed 3/05/2025 and demonstrated multilevel degenerative disc disease and facet arthritis without evidence of fracture, malalignment, or acute abnormality. Lumbar MRI ordered and pending. He states that although he has received his prescribed TENS unit, he has not yet been able to use it as he is not sure of how to operate it. He has also not yet  attempted his prescribed muscle relaxer.    UTI: He states that he did not complete prescribed Amox-Clav due to overly large size of tablets.     Patient Care Team:  Brandin Bolton MD as PCP - General (Internal Medicine)  George Phelan II, MD as Consulting Physician (Hematology and Oncology)  Jose Inman MD as Consulting Physician (Urology)  Mulugeta Millan MD as Consulting Physician (Gastroenterology)  Jose Christine III, MD as Referring Physician (Thoracic Surgery)  Seth Randhawa MD as Consulting Physician (Ophthalmology)  James Cyr MD (Inactive) as Consulting Physician (Cardiology)  Stephon Sommers MD as Consulting Physician (Sleep Medicine)  Rolanda Solomon MD as Consulting Physician (Nephrology)    REVIEW OF SYSTEMS     Review of Systems   As Per HPI.  All other systems negative.     Answers submitted by the patient for this visit:  Back Pain Questionnaire (Submitted on 3/19/2025)  Chief Complaint: Back pain  Chronicity: chronic  Onset: more than 1 year ago  Frequency: daily  Progression since onset: worsening  Pain location: lumbar spine  Pain quality: aching  Radiates to: does not radiate  Pain - numeric: 6/10  Pain is: the same all the time  Aggravated by: standing  Stiffness is present: all day  bladder incontinence: Yes  weight loss: Yes  Risk factors: history of cancer, poor posture      PHYSICAL EXAMINATION     Physical Exam  Vitals and nursing note reviewed.   Constitutional:       General: He is not in acute distress.     Appearance: Normal appearance. He is not ill-appearing.   Cardiovascular:      Rate and Rhythm: Normal rate and regular rhythm.      Pulses: Normal pulses.      Heart sounds: Normal heart sounds. No murmur heard.     No friction rub. No gallop.   Pulmonary:      Effort: Pulmonary effort is normal. No respiratory distress.      Breath sounds: Normal breath sounds. No stridor. No wheezing.   Musculoskeletal:      Lumbar back: Spasms, tenderness and bony  "tenderness present.   Neurological:      Mental Status: He is alert.   Psychiatric:         Mood and Affect: Mood normal.         Behavior: Behavior normal.             REVIEWED DATA     Labs:     Lab Results   Component Value Date     02/24/2025    K 4.2 02/24/2025    CALCIUM 9.2 02/24/2025    AST 19 02/24/2025    ALT 10 02/24/2025    BUN 22 02/24/2025    CREATININE 1.41 (H) 02/24/2025    CREATININE 1.29 (H) 06/20/2024    CREATININE 1.05 11/17/2023     Lab Results   Component Value Date    HGBA1C 5.50 08/06/2021    HGBA1C 6.20 (H) 01/17/2020     Lab Results   Component Value Date    LDL 47 06/23/2023    LDL 52 05/12/2023    LDL 98 06/02/2022    HDL 62 (H) 06/23/2023    HDL 52 05/12/2023    TRIG 36 06/23/2023    TRIG 49 05/12/2023     Lab Results   Component Value Date    TSH 1.850 02/24/2025    TSH 1.600 05/12/2023    TSH 2.710 01/17/2020    FREET4 1.45 02/24/2025    FREET4 1.36 05/12/2023    FREET4 1.52 01/17/2020     Lab Results   Component Value Date    WBC 3.71 03/14/2025    HGB 10.7 (L) 03/14/2025     03/14/2025   No results found for: \"MALBCRERATIO\"          Imaging:               Medical Tests:               Summary of old records / correspondence / consultant report:             Request outside records:       "

## 2025-03-21 ENCOUNTER — TELEPHONE (OUTPATIENT)
Dept: GASTROENTEROLOGY | Facility: CLINIC | Age: 75
End: 2025-03-21
Payer: MEDICARE

## 2025-03-21 NOTE — TELEPHONE ENCOUNTER
CALLED TO SW PT ABOUT REQUEST TO R/S SHAYY IN SCHEDULING COMMUNICATED PT NOT SCHEDULED HE MUST BE IN THE DEPOT NO ANSWER FROM PT LVM COMMUNICATING REASON FOR CALL OK FOR THE HUB TO RELAY

## 2025-03-21 NOTE — TELEPHONE ENCOUNTER
Provider: DR REEMA LIPSCOMB    Caller: JJ BOURGEOIS     Relationship to Patient: SELF    Phone Number: [phone]     Reason for Call: PT WOULD LIKE TO SCHEDULE EGD PLEASE ADVISE AND CALL PT BACK

## 2025-03-22 LAB
AMPHETAMINES SERPL QL SCN: NEGATIVE NG/ML
BARBITURATES SERPL QL SCN: NEGATIVE UG/ML
BENZODIAZ SERPL QL SCN: NEGATIVE NG/ML
CANNABINOIDS SERPL QL SCN: NEGATIVE NG/ML
COCAINE+BZE SERPL QL SCN: NEGATIVE NG/ML
ETHANOL SERPL-MCNC: NEGATIVE GM/DL
METHADONE SERPL QL SCN: NEGATIVE NG/ML
OPIATES SERPL QL SCN: NEGATIVE NG/ML
OXYCODONE+OXYMORPHONE SERPLBLD QL SCN: NEGATIVE NG/ML
PCP SERPL QL SCN: NEGATIVE NG/ML
PROPOXYPH SERPL QL SCN: NEGATIVE NG/ML

## 2025-03-24 ENCOUNTER — TELEPHONE (OUTPATIENT)
Dept: GASTROENTEROLOGY | Facility: CLINIC | Age: 75
End: 2025-03-24
Payer: MEDICARE

## 2025-03-24 DIAGNOSIS — R13.10 DYSPHAGIA, UNSPECIFIED TYPE: Primary | ICD-10-CM

## 2025-03-24 DIAGNOSIS — K31.84 GASTROPARESIS: ICD-10-CM

## 2025-03-24 RX ORDER — SODIUM CHLORIDE, SODIUM LACTATE, POTASSIUM CHLORIDE, CALCIUM CHLORIDE 600; 310; 30; 20 MG/100ML; MG/100ML; MG/100ML; MG/100ML
30 INJECTION, SOLUTION INTRAVENOUS CONTINUOUS
OUTPATIENT
Start: 2025-03-24 | End: 2025-03-24

## 2025-03-24 NOTE — TELEPHONE ENCOUNTER
Hub staff attempted to follow warm transfer process and was unsuccessful     Caller: Jose Hurtado    Relationship to patient: Self    Best call back number: [phone]    Patient is needing: PATIENT WOULD LIKE TO RESCHEDULE HIS PROCEDURE THAT WAS SCHEDULED ON 3/20/25. PLEASE CALL

## 2025-03-24 NOTE — TELEPHONE ENCOUNTER
BETTINA LUCAS IN SCHEDULING PT SCHEDULED 04/11/2025 ARRIVING AT 1030AM PT VERBALIZES UNDERSTANDING 48 HR HOLD PRIOR TO PROCEDURE THIS IS A R/S PT HAS CLEARANCE HE COMMUNICATED ALSO COMMUNICATED STILL HAS HIS PREP INFORMATION FOR EGD AND WILL UPDATE OK FOR HUB TO RELAY

## 2025-04-04 ENCOUNTER — TELEPHONE (OUTPATIENT)
Dept: GASTROENTEROLOGY | Facility: CLINIC | Age: 75
End: 2025-04-04

## 2025-04-04 NOTE — TELEPHONE ENCOUNTER
Hub staff attempted to follow warm transfer process and was unsuccessful     Caller: Jose Hurtado    Relationship to patient: Self    Best call back number: [phone]    Patient is needing: PATIENT CALLED IN AND STATED THAT HE WAS RETURNING MISSED CALL TO CONFIRM PROCEDURE APPOINTMENT 04/11/2025. PATIENT STATED THAT HE HAS NO QUESTIONS. IF NEED TO CALL BACK, OKAY TO DO SO.

## 2025-04-11 ENCOUNTER — ANESTHESIA EVENT (OUTPATIENT)
Dept: GASTROENTEROLOGY | Facility: HOSPITAL | Age: 75
End: 2025-04-11
Payer: MEDICARE

## 2025-04-11 ENCOUNTER — ANESTHESIA (OUTPATIENT)
Dept: GASTROENTEROLOGY | Facility: HOSPITAL | Age: 75
End: 2025-04-11
Payer: MEDICARE

## 2025-04-11 ENCOUNTER — HOSPITAL ENCOUNTER (OUTPATIENT)
Facility: HOSPITAL | Age: 75
Setting detail: HOSPITAL OUTPATIENT SURGERY
Discharge: HOME | End: 2025-04-11
Attending: INTERNAL MEDICINE | Admitting: INTERNAL MEDICINE
Payer: MEDICARE

## 2025-04-11 VITALS
RESPIRATION RATE: 16 BRPM | OXYGEN SATURATION: 100 % | WEIGHT: 168 LBS | HEIGHT: 77 IN | SYSTOLIC BLOOD PRESSURE: 119 MMHG | HEART RATE: 67 BPM | TEMPERATURE: 98.1 F | DIASTOLIC BLOOD PRESSURE: 65 MMHG | BODY MASS INDEX: 19.84 KG/M2

## 2025-04-11 DIAGNOSIS — K31.84 GASTROPARESIS: ICD-10-CM

## 2025-04-11 DIAGNOSIS — R13.10 DYSPHAGIA, UNSPECIFIED TYPE: ICD-10-CM

## 2025-04-11 DIAGNOSIS — R11.0 NAUSEA: ICD-10-CM

## 2025-04-11 DIAGNOSIS — K25.9 GASTRIC ULCER WITHOUT HEMORRHAGE OR PERFORATION, UNSPECIFIED CHRONICITY: ICD-10-CM

## 2025-04-11 DIAGNOSIS — K22.70 BARRETT'S ESOPHAGUS WITHOUT DYSPLASIA: ICD-10-CM

## 2025-04-11 PROCEDURE — 88342 IMHCHEM/IMCYTCHM 1ST ANTB: CPT | Performed by: INTERNAL MEDICINE

## 2025-04-11 PROCEDURE — 25810000003 LACTATED RINGERS PER 1000 ML: Performed by: INTERNAL MEDICINE

## 2025-04-11 PROCEDURE — 43239 EGD BIOPSY SINGLE/MULTIPLE: CPT | Performed by: INTERNAL MEDICINE

## 2025-04-11 PROCEDURE — 88305 TISSUE EXAM BY PATHOLOGIST: CPT | Performed by: INTERNAL MEDICINE

## 2025-04-11 PROCEDURE — 25010000002 PROPOFOL 1000 MG/100ML EMULSION: Performed by: NURSE ANESTHETIST, CERTIFIED REGISTERED

## 2025-04-11 PROCEDURE — 25010000002 LIDOCAINE 2% SOLUTION: Performed by: NURSE ANESTHETIST, CERTIFIED REGISTERED

## 2025-04-11 PROCEDURE — S0260 H&P FOR SURGERY: HCPCS | Performed by: INTERNAL MEDICINE

## 2025-04-11 RX ORDER — PROPOFOL 10 MG/ML
INJECTION, EMULSION INTRAVENOUS AS NEEDED
Status: DISCONTINUED | OUTPATIENT
Start: 2025-04-11 | End: 2025-04-11 | Stop reason: SURG

## 2025-04-11 RX ORDER — LIDOCAINE HYDROCHLORIDE 20 MG/ML
INJECTION, SOLUTION INFILTRATION; PERINEURAL AS NEEDED
Status: DISCONTINUED | OUTPATIENT
Start: 2025-04-11 | End: 2025-04-11 | Stop reason: SURG

## 2025-04-11 RX ORDER — SODIUM CHLORIDE, SODIUM LACTATE, POTASSIUM CHLORIDE, CALCIUM CHLORIDE 600; 310; 30; 20 MG/100ML; MG/100ML; MG/100ML; MG/100ML
30 INJECTION, SOLUTION INTRAVENOUS CONTINUOUS
Status: DISCONTINUED | OUTPATIENT
Start: 2025-04-11 | End: 2025-04-11 | Stop reason: HOSPADM

## 2025-04-11 RX ADMIN — SODIUM CHLORIDE, SODIUM LACTATE, POTASSIUM CHLORIDE, AND CALCIUM CHLORIDE 30 ML/HR: 600; 310; 30; 20 INJECTION, SOLUTION INTRAVENOUS at 10:46

## 2025-04-11 RX ADMIN — PROPOFOL INJECTABLE EMULSION 50 MG: 10 INJECTION, EMULSION INTRAVENOUS at 11:55

## 2025-04-11 RX ADMIN — LIDOCAINE HYDROCHLORIDE 100 MG: 20 INJECTION, SOLUTION INFILTRATION; PERINEURAL at 11:51

## 2025-04-11 RX ADMIN — PROPOFOL INJECTABLE EMULSION 100 MG: 10 INJECTION, EMULSION INTRAVENOUS at 11:51

## 2025-04-11 NOTE — ANESTHESIA POSTPROCEDURE EVALUATION
Patient: Jose Hurtado    Procedure Summary       Date: 04/11/25 Room / [facility]:  [facility]    Anesthesia Start: 1142 Anesthesia Stop: 1205    Procedure: ESOPHAGOGASTRODUODENOSCOPY WITH BIOPSIES (Esophagus) Diagnosis:       Personal history of gastric ulcer      Duodenogastric bile reflux      Gastritis      Esophagitis      (Gastric ulcer without hemorrhage or perforation, unspecified chronicity [K25.9])      (Nausea [R11.0])      (Gastroparesis [K31.84])      (Pardo's esophagus without dysplasia [K22.70])    Surgeons: Mulugeta Millan MD Provider: Estrada Florian MD    Anesthesia Type: MAC ASA Status: 3            Anesthesia Type: MAC    Vitals  Vitals Value Taken Time   /65 04/11/25 12:26   Temp     Pulse 67 04/11/25 12:27   Resp 16 04/11/25 12:24   SpO2 99 % 04/11/25 12:27   Vitals shown include unfiled device data.        Post Anesthesia Care and Evaluation    Patient location during evaluation: PHASE II  Patient participation: complete - patient participated  Level of consciousness: awake and alert  Pain management: adequate    Airway patency: patent  Anesthetic complications: No anesthetic complications  PONV Status: none  Cardiovascular status: acceptable and hemodynamically stable  Respiratory status: acceptable, nonlabored ventilation and spontaneous ventilation  Hydration status: acceptable

## 2025-04-11 NOTE — DISCHARGE INSTRUCTIONS
For the next 24 hours patient needs to be with a responsible adult.    For 24 hours DO NOT drive, operate machinery, appliances, drink alcohol, make important decisions or sign legal documents.    Start with a light or bland diet if you are feeling sick to your stomach otherwise advance to regular diet as tolerated.    Follow recommendations on procedure report if provided by your doctor.    Call Dr Millan for problems [phone]    Problems may include but not limited to: large amounts of bleeding, trouble breathing, repeated vomiting, severe unrelieved pain, fever or chills.

## 2025-04-11 NOTE — H&P
[facility]  Pre Procedure History & Physical    Chief Complaint:   GERD, history of esophageal cancer, Pardo's esophagus, anastomotic ulcer    Subjective     HPI:   75-year-old male presents the endoscopy suite for upper endoscopic evaluation.  He has a history of Pardo's esophagus and reflux with previous esophageal resection and pull-through for esophageal cancer.  Recent endoscopy in December revealed anastomotic ulcer and retained gastric contents.    Past Medical History:   Past Medical History:   Diagnosis Date    Acute deep vein thrombosis (DVT) of popliteal vein of left lower extremity 03/24/2020    Anemia     Anti-phospholipid syndrome     On lifelong anticoagulation therapy    Bladder disorder     LEAKAGE   ON MED  wers pads    Bleeding hemorrhoids     Chronic kidney disease     Community acquired pneumonia     HISTORY OF IN 2014    Congestive heart failure     COPD (chronic obstructive pulmonary disease)     Deep venous thrombosis 2006, 2008    Left lower extremity multiple    Depression     Esophageal carcinoma 12/31/2014    had chemo and radiation prior surgery    Gastroparesis     Hemorrhoids     HH (hiatus hernia)     History of atrial fibrillation 2015    ONE EPISODE WHILE HOSPITALIZED    History of kidney stones     History of nephrolithiasis     History of pancreatitis     PT STATES MANY YEARS AGO    History of radiation therapy     History of transfusion     Hypertension     Long-term (current) use of anticoagulants, INR goal 2.0-3.0     Lymphedema     Malignant neoplasm of prostate     Other hyperlipidemia 01/30/2018 January 30, 2018 lipid panel risk 12.8%    Restless legs syndrome     Sleep apnea     OCCASIONALLY WEARS CPAP    Squamous carcinoma     on the head       Past Surgical History:  Past Surgical History:   Procedure Laterality Date    APPENDECTOMY  1950    BRONCHOSCOPY      (Diagnostic)    CARDIAC CATHETERIZATION N/A 05/14/2022    Procedure: Left Heart  Cath;  Surgeon: Eric So MD;  Location: [facility] CATH INVASIVE LOCATION;  Service: Cardiology;  Laterality: N/A;    CARDIAC CATHETERIZATION N/A 05/14/2022    Procedure: Coronary angiography;  Surgeon: Eric So MD;  Location: [facility] CATH INVASIVE LOCATION;  Service: Cardiology;  Laterality: N/A;    CARDIAC CATHETERIZATION N/A 05/14/2022    Procedure: Left ventriculography;  Surgeon: Eric So MD;  Location: [facility] CATH INVASIVE LOCATION;  Service: Cardiology;  Laterality: N/A;    CATARACT EXTRACTION Bilateral 2014    COLONOSCOPY  12/15/2014    Complete / Description: EH, IH, torts, stool, follow-up colonoscopy due in 5 years.    COLONOSCOPY N/A 06/13/2017    non-thrombosed external hemorrhoids, normal examined ileum, IH    COLONOSCOPY N/A 02/26/2019    Procedure: COLONOSCOPY with Cold Polypectomy;  Surgeon: Mulugeta Millan MD;  Location: [facility] ENDOSCOPY;  Service: Gastroenterology    COLONOSCOPY N/A 12/16/2020    Procedure: COLONOSCOPY to cecum into TI;  Surgeon: Mesha Sanchez MD;  Location: [facility] ENDOSCOPY;  Service: Gastroenterology;  Laterality: N/A;  pre: lower GI bleed  post: hemorrhoids     CYSTOSCOPY W/ LASER LITHOTRIPSY      ENDOSCOPY N/A 06/13/2017    Procedure: ESOPHAGOGASTRODUODENOSCOPY WITH COLD BIOPSY;  Surgeon: Lake Gonzalez MD;  Location: [facility] ENDOSCOPY;  Service:     ENDOSCOPY N/A 11/18/2021    Procedure: ESOPHAGOGASTRODUODENOSCOPY WITH  DILATATION WITH FLUOROSCOPY;  Surgeon: Jose Christine III, MD;  Location: [facility] ENDOSCOPY;  Service: Gastroenterology;  Laterality: N/A;  Pre: dysphagia, h/x of adinocarcinoma of esophagus  Post: same    ENDOSCOPY N/A 08/07/2023    Procedure: ESOPHAGOGASTRODUODENOSCOPY;  Surgeon: Jameel Valencia MD;  Location: [facility] ENDOSCOPY;  Service: Gastroenterology;  Laterality: N/A;  PRE- DYSPHAGIA  POST-ABORTED DUE TO RETAINED FOOD    ENDOSCOPY N/A 08/08/2023    Procedure: ESOPHAGOGASTRODUODENOSCOPY with bx;   Surgeon: Jameel Valencia MD;  [facility];  Service: Gastroenterology;  Laterality: N/A;  pre: N/V, abd pain  post: esophageal nodule, retained food    ENDOSCOPY N/A 12/12/2024    Procedure: ESOPHAGOGASTRODUODENOSCOPY with cold biopsies;  Surgeon: Mulugeta Millan MD;  [facility];  Service: Gastroenterology;  Laterality: N/A;  pre: Nausea, gastroparesis, history of esophageal cancer with pull-through, Pardo's esophagus  post: inflammation @GE junction anastamosis, retained gastric content    ESOPHAGECTOMY      April 2015, stage IIB esophageal carcinoma, sub-total resection.    ESOPHAGECTOMY      Esophagectomy Subtotal [place] [facility] Procedure    EXCISION LESION  08/2012    Removal of Squamous Cell CA on Head    HAMMER TOE REPAIR  09/2014    Hammertoe Operation (Each Toe), 10/2014    HAMMER TOE REPAIR Left 10/03/2017    Procedure: Left second third and fourth distal interphalangeal joint resection with flexor tenotomy;  Surgeon: Mulugeta Lira MD;  Location:  [facility];  Service:     HEMORRHOIDECTOMY N/A 12/23/2020    Procedure: HEMORRHOIDECTOMY;  Surgeon: Billy Julio Jr., MD;  Location: [facility] MAIN OR;  Service: General;  Laterality: N/A;    HERNIA REPAIR      incisional    JEJUNOSTOMY      Laparoscopic    JEJUNOSTOMY      tube removal     KNEE SURGERY Bilateral 1967, 1973, 1981    PATELLA SURGERY Left     removed    PILONIDAL CYST / SINUS EXCISION      SC ARTHRP KNE CONDYLE&PLATU MEDIAL&LAT COMPARTMENTS Right 03/26/2018    Procedure: TOTAL KNEE ARTHROPLASTY;  Surgeon: Renny Solis MD;  Location: [facility] MAIN OR;  Service: Orthopedics    PROSTATECTOMY  2010    PYLOROPLASTY      SIGMOIDOSCOPY N/A 08/02/2023    Procedure: SIGMOIDOSCOPY FLEXIBLE;  Surgeon: Mesha Sanchez MD;  [facility];  Service: Gastroenterology;  Laterality: N/A;  PRE- ABNORMAL CT  POST- HEMORRHOIDS, ULCERATION    SPINAL FUSION  02/1998    C 5,6    TONSILLECTOMY      UPPER  GASTROINTESTINAL ENDOSCOPY  12/15/2014    LA Grade D esophagitis, Pardo's, HH, multiple duodenal ulcers    VENTRAL/INCISIONAL HERNIA REPAIR N/A 2016    Procedure: VENTRAL/INCISIONAL HERNIA REPAIR, open, with mesh, and component separation;  Surgeon: Darren Rivas MD;  Location: [facility] OR;  Service:        Family History:  Family History   Problem Relation Age of Onset    Cerebral aneurysm Mother         cerebral artery aneurysm ( age 56)    Anxiety disorder Father     Suicide Attempts Father          of suicide    Cancer Father         bladder    Prostate cancer Brother 68    No Known Problems Brother     Pancreatic cancer Nephew     Colon cancer Neg Hx     Esophageal cancer Neg Hx     Dementia Neg Hx     Malig Hyperthermia Neg Hx        Social History:   reports that he quit smoking about 51 years ago. His smoking use included cigarettes. He started smoking about 54 years ago. He has a 6 pack-year smoking history. He has been exposed to tobacco smoke. He has quit using smokeless tobacco.  His smokeless tobacco use included chew. He reports current alcohol use of about 3.0 standard drinks of alcohol per week. He reports that he does not use drugs.    Medications:   No medications prior to admission.       Allergies:  Patient has no known allergies.    ROS:    Pertinent items are noted in HPI     Objective     There were no vitals taken for this visit.    Physical Exam   Constitutional: Pt is oriented to person, place, and time and well-developed, well-nourished, and in no distress.   Mouth/Throat: Oropharynx is clear and moist.   Neck: Normal range of motion.   Cardiovascular: Normal rate, regular rhythm and normal heart sounds.    Pulmonary/Chest: Effort normal and breath sounds normal.   Abdominal: Soft. Nontender  Skin: Skin is warm and dry.   Psychiatric: Mood, memory, affect and judgment normal.     Assessment & Plan     Diagnosis:  GERD  Esophageal cancer status post  resection  Pardo's esophagus  Anastomotic ulcer    Anticipated Surgical Procedure:  EGD    The risks, benefits, and alternatives of this procedure have been discussed with the patient or the responsible party- the patient understands and agrees to proceed.

## 2025-04-11 NOTE — ANESTHESIA PREPROCEDURE EVALUATION
Anesthesia Evaluation     Patient summary reviewed and Nursing notes reviewed   NPO Solid Status: > 8 hours  NPO Liquid Status: > 2 hours           Airway   Mallampati: II  TM distance: >3 FB  Neck ROM: full  No difficulty expected  Comment: Grade I view with MAC 4  Dental    (+) upper dentures, partials and poor dentition    Comment: Lower partials    Pulmonary - normal exam    breath sounds clear to auscultation  (+) a smoker Former, COPD,shortness of breath, sleep apnea on CPAP  Cardiovascular - normal exam    ECG reviewed  Rhythm: regular  Rate: normal    (+) hypertension less than 2 medications, dysrhythmias Paroxysmal Atrial Fib, CHF , DVT, hyperlipidemia    ROS comment: EF 56% by ECHO 5/22/normal coronaries by cath 5/22/patient denies hx of MI    Neuro/Psych  (+) tremors, numbness, psychiatric history    ROS Comment: RLS/peripheral polyneuropathy  GI/Hepatic/Renal/Endo    (+) hiatal hernia, PUD, GI bleeding , renal disease- CRI and stones    ROS Comment: Hx esophagectomy for CA/gastroparesis/Pardo's    Musculoskeletal     Abdominal  - normal exam   Substance History      OB/GYN          Other   blood dyscrasia anemia,   history of cancer remission      Other Comment: Hx prostate CA/esophageal CA, s/p esophagectomy/antiphospholipid syndrome/Hgb 10.7                Anesthesia Plan    ASA 3     MAC     intravenous induction     Anesthetic plan, risks, benefits, and alternatives have been provided, discussed and informed consent has been obtained with: patient.      CODE STATUS:

## 2025-04-11 NOTE — ANESTHESIA POSTPROCEDURE EVALUATION
Patient: Jose Hurtado    Procedure Summary       Date: 04/11/25 Room / [facility]    Anesthesia Start: 1142 Anesthesia Stop:     Procedure: ESOPHAGOGASTRODUODENOSCOPY WITH BIOPSIES (Esophagus) Diagnosis:       Personal history of gastric ulcer      Duodenogastric bile reflux      Gastritis      Esophagitis      (Gastric ulcer without hemorrhage or perforation, unspecified chronicity [K25.9])      (Nausea [R11.0])      (Gastroparesis [K31.84])      (Pardo's esophagus without dysplasia [K22.70])    Surgeons: Mulugeta Millan MD Provider: Estrada Florian MD    Anesthesia Type: MAC ASA Status: 3            Anesthesia Type: MAC    Vitals  Vitals Value Taken Time   /65 04/11/25 12:26   Temp     Pulse 67 04/11/25 12:27   Resp 16 04/11/25 12:24   SpO2 99 % 04/11/25 12:27   Vitals shown include unfiled device data.        Post Anesthesia Care and Evaluation    Patient location during evaluation: PHASE II  Patient participation: complete - patient participated  Level of consciousness: awake and alert  Pain management: adequate    Airway patency: patent  Anesthetic complications: No anesthetic complications  PONV Status: none  Cardiovascular status: acceptable and hemodynamically stable  Respiratory status: acceptable, nonlabored ventilation and spontaneous ventilation  Hydration status: acceptable

## 2025-04-15 LAB
CYTO UR: NORMAL
LAB AP CASE REPORT: NORMAL
PATH REPORT.ADDENDUM SPEC: NORMAL
PATH REPORT.FINAL DX SPEC: NORMAL
PATH REPORT.GROSS SPEC: NORMAL

## 2025-04-16 ENCOUNTER — OFFICE VISIT (OUTPATIENT)
Dept: INTERNAL MEDICINE | Age: 75
End: 2025-04-16
Payer: MEDICARE

## 2025-04-16 VITALS
TEMPERATURE: 96.9 F | HEIGHT: 76 IN | SYSTOLIC BLOOD PRESSURE: 120 MMHG | WEIGHT: 176 LBS | RESPIRATION RATE: 14 BRPM | HEART RATE: 80 BPM | BODY MASS INDEX: 21.43 KG/M2 | DIASTOLIC BLOOD PRESSURE: 68 MMHG

## 2025-04-16 DIAGNOSIS — M47.26 OSTEOARTHRITIS OF SPINE WITH RADICULOPATHY, LUMBAR REGION: Primary | ICD-10-CM

## 2025-04-16 NOTE — ASSESSMENT & PLAN NOTE
Proceed with planned MRI of lumbar spine (orders by orthopedic surgery)  Advised to take gabapentin 300 mg BID as recommended by orthopedist   Take Tylenol ES 1-2 every 8 hours PRN for pain   Consider AVTAR pending MRI findings

## 2025-04-16 NOTE — PROGRESS NOTES
"                                          J  U  N  O  H    K  I  M ,   M  D                             I  N  T  E  R  N  A  L    M  E  D  I  C  I  N  E         ENCOUNTER DATE:  04/16/2025    Jose Hurtado / 75 y.o. / male    OFFICE VISIT ENCOUNTER       CHIEF COMPLAINT / REASON FOR OFFICE VISIT     Back Pain      ASSESSMENT & PLAN     Problem List Items Addressed This Visit          High    Osteoarthritis of spine with radiculopathy, lumbar region - Primary (Chronic)    Current Assessment & Plan   Proceed with planned MRI of lumbar spine (orders by orthopedic surgery)  Advised to take gabapentin 300 mg BID as recommended by orthopedist   Take Tylenol ES 1-2 every 8 hours PRN for pain   Consider AVTAR pending MRI findings           No orders of the defined types were placed in this encounter.    No orders of the defined types were placed in this encounter.      SUMMARY/DISCUSSION  Assessment & Plan  1. Back pain.  - Reports chronic mid-back pain rated at 7 out of 10, which worsens upon standing and radiates across his back, accompanied by left-sided foot drop.  - Prescribed cyclobenzaprine and uses Tylenol as needed but has not noticed significant improvement.  - MRI was ordered but not scheduled due to concerns about an InterStim device.  - Advised to try extra strength Tylenol, starting with one tablet and increasing to two if necessary. If pain persists, further evaluation will be considered.           TOTAL TIME OF ENCOUNTER:        Next Appointment with me: 5/30/2025     No follow-ups on file.      VITAL SIGNS     Vitals:    04/16/25 1038   BP: 120/68   Pulse: 80   Resp: 14   Temp: 96.9 °F (36.1 °C)   Weight: 79.8 kg (176 lb)   Height: 193 cm (76\")       BP Readings from Last 3 Encounters:   04/16/25 120/68   04/11/25 119/65   03/19/25 124/72     Wt Readings from Last 3 Encounters:   04/16/25 79.8 kg (176 lb)   04/11/25 76.2 kg (168 lb)   03/19/25 76.2 kg (168 lb)     Body mass index is 21.42 kg/m².    Blood " pressure readings recorded on patient flowsheet:       No data to display                  MEDICATIONS AT THE TIME OF OFFICE VISIT     Current Outpatient Medications on File Prior to Visit   Medication Sig    acetaminophen (TYLENOL) 325 MG tablet Take 2 tablets by mouth Every 6 (Six) Hours As Needed for Mild Pain, Headache or Fever.    albuterol sulfate  (90 Base) MCG/ACT inhaler Inhale 2 puffs 4 (Four) Times a Day.    atorvastatin (LIPITOR) 40 MG tablet Take 1 tablet by mouth Every Night.    Budeson-Glycopyrrol-Formoterol (Breztri Aerosphere) 160-9-4.8 MCG/ACT aerosol inhaler Inhale 2 puffs 2 (Two) Times a Day.    cyanocobalamin 1000 MCG/ML injection Inject 1 mL under the skin into the appropriate area as directed Every 30 (Thirty) Days. Indications: Inadequate Vitamin B12    Eliquis 5 MG tablet tablet TAKE ONE TABLET BY MOUTH EVERY 12 HOURS    gabapentin (NEURONTIN) 300 MG capsule Take 1 capsule by mouth 2 (Two) Times a Day.    metoclopramide (Reglan) 5 MG tablet Take 1 tablet by mouth 2 (Two) Times a Day. You will need to schedule an office apt for further refills    PARoxetine (PAXIL) 40 MG tablet TAKE ONE TABLET BY MOUTH EVERY MORNING    polyethylene glycol (MIRALAX) 17 g packet Take 17 g by mouth Daily. (Patient taking differently: Take 17 g by mouth Daily.)    pramipexole (MIRAPEX) 1.5 MG tablet Take 1 tablet by mouth 2 (Two) Times a Day. Indications: Restless Leg Syndrome    [DISCONTINUED] nitrofurantoin, macrocrystal-monohydrate, (Macrobid) 100 MG capsule Take 1 capsule by mouth 2 (Two) Times a Day.    Cholecalciferol (Vitamin D3) 50 MCG (2000 UT) capsule Take 1 capsule by mouth Daily. (Patient not taking: Reported on 4/16/2025)    cyclobenzaprine (FLEXERIL) 5 MG tablet Take 1 tablet by mouth 3 (Three) Times a Day As Needed for Muscle Spasms. (Patient not taking: Reported on 4/16/2025)    sucralfate (Carafate) 1 g tablet Take 1 tablet by mouth 4 (Four) Times a Day With Meals & at Bedtime. (Patient  not taking: Reported on 4/16/2025)     No current facility-administered medications on file prior to visit.          HISTORY OF PRESENT ILLNESS     History of Present Illness  The patient presents for evaluation of back pain    Chronic mid-back pain has progressively worsened over the past month. Pain intensifies upon standing, radiates across the back, and is accompanied by left-sided foot drop during ambulation. Pain is rated as a 7 on a scale of 1 to 10. Despite being prescribed cyclobenzaprine, no significant relief is reported. Tylenol is taken as needed, but over-the-counter NSAIDs are avoided. He was prescribed gabapentin 300 mg BID for radicular pain.  Xray shows degenerative changes without acute findings. An MRI has been ordered by PA but not yet scheduled due to the desire to remove an InterStim device prior to the procedure. No pain management specialist is currently involved.       Patient Care Team:  Brandin Bolton MD as PCP - General (Internal Medicine)  Tapan, George THORPE II, MD as Consulting Physician (Hematology and Oncology)  Jose Inman MD as Consulting Physician (Urology)  Mulugeta Millan MD as Consulting Physician (Gastroenterology)  Jose Christine III, MD as Referring Physician (Thoracic Surgery)  Seth Randhawa MD as Consulting Physician (Ophthalmology)  James Cyr MD (Inactive) as Consulting Physician (Cardiology)  Stephon Sommers MD as Consulting Physician (Sleep Medicine)  Rolanda Solomon MD as Consulting Physician (Nephrology)    REVIEW OF SYSTEMS     Review of Systems       PHYSICAL EXAMINATION     Physical Exam  Alert with normal thought and judgment.   No acute distress         REVIEWED DATA     Labs:     Lab Results   Component Value Date     02/24/2025    K 4.2 02/24/2025    CALCIUM 9.2 02/24/2025    AST 19 02/24/2025    ALT 10 02/24/2025    BUN 22 02/24/2025    CREATININE 1.41 (H) 02/24/2025    CREATININE 1.29 (H) 06/20/2024    CREATININE 1.05  "11/17/2023    EGFR 51.3 (L) 02/24/2025     Lab Results   Component Value Date    HGBA1C 5.50 08/06/2021    HGBA1C 6.20 (H) 01/17/2020   No results found for: \"MALBCRERATIO\"  Lab Results   Component Value Date    LDL 47 06/23/2023    LDL 52 05/12/2023    LDL 98 06/02/2022    HDL 62 (H) 06/23/2023    HDL 52 05/12/2023    TRIG 36 06/23/2023    TRIG 49 05/12/2023     Lab Results   Component Value Date    TSH 1.850 02/24/2025    TSH 1.600 05/12/2023    TSH 2.710 01/17/2020    FREET4 1.45 02/24/2025    FREET4 1.36 05/12/2023    FREET4 1.52 01/17/2020     Lab Results   Component Value Date    WBC 3.71 03/14/2025    HGB 10.7 (L) 03/14/2025     03/14/2025           Imaging:     XR Spine Lumbar Complete 4+VW (03/05/2025 14:42)   FINDINGS: There is disc space narrowing at all levels of the lumbar  spine, least involved at L4-5. Multilevel endplate spurring. Facet  arthritis is present. There is no evidence for fracture malalignment or  acute abnormality. There is a minor scoliotic curvature convex to the  left at L4-5. Atherosclerotic calcifications are present. A sacral  stimulator device is present with lead extending overlying the left  sacrum. Surgical clips overlie the abdomen.     IMPRESSION:  Multilevel degenerative disc disease and facet arthritis  without evidence for fracture malalignment or acute abnormality of the  lumbar spine.        Medical Tests:               Summary of old records / correspondence / consultant report:             Request outside records:       "

## 2025-04-24 ENCOUNTER — RESULTS FOLLOW-UP (OUTPATIENT)
Dept: GASTROENTEROLOGY | Facility: HOSPITAL | Age: 75
End: 2025-04-24
Payer: MEDICARE

## 2025-04-28 ENCOUNTER — TELEPHONE (OUTPATIENT)
Dept: GASTROENTEROLOGY | Facility: CLINIC | Age: 75
End: 2025-04-28
Payer: MEDICARE

## 2025-04-28 DIAGNOSIS — K25.7 CHRONIC GASTRIC ULCER, UNSPECIFIED WHETHER GASTRIC ULCER HEMORRHAGE OR PERFORATION PRESENT: Primary | ICD-10-CM

## 2025-04-28 RX ORDER — SUCRALFATE 1 G/1
1 TABLET ORAL
Qty: 120 TABLET | Refills: 2 | Status: SHIPPED | OUTPATIENT
Start: 2025-04-28

## 2025-04-28 RX ORDER — PANTOPRAZOLE SODIUM 40 MG/1
40 TABLET, DELAYED RELEASE ORAL DAILY
Qty: 90 TABLET | Refills: 1 | Status: SHIPPED | OUTPATIENT
Start: 2025-04-28

## 2025-04-28 RX ORDER — SODIUM CHLORIDE, SODIUM LACTATE, POTASSIUM CHLORIDE, CALCIUM CHLORIDE 600; 310; 30; 20 MG/100ML; MG/100ML; MG/100ML; MG/100ML
30 INJECTION, SOLUTION INTRAVENOUS CONTINUOUS
OUTPATIENT
Start: 2025-04-28 | End: 2025-04-28

## 2025-04-28 NOTE — TELEPHONE ENCOUNTER
BETTINA LIU IN SCHEDULING PT SCHEDULED 07/17/2025 ARRIVING AT 0700AM PT ON ELIQUIS VERBALIZES UNDERSTANDING THE HOLD AND ALSO HIGHLIGHTED ON EGD PREP INFORMATION SHEET MAILED TO ADDRESS ON FILE VERIFIED BY PT OK FOR THE HUB TO RELAY

## 2025-04-28 NOTE — TELEPHONE ENCOUNTER
Called pt and advised of path results and of Dr Millan's recommendations.   Pt verb understanding and reports he needs a new script  for carafate. Advised pt we will escribe carafate 1mg dissolve tab in sm bit of water and drink take 1 po with meals and at bedtime #120 wityh 1 refills. Also escribed pantoprazole 40mg po daily #90 with 1 refill.   Pt verb understanding.     Message sent to Dr Millan for case request for repeat egd.

## 2025-04-28 NOTE — TELEPHONE ENCOUNTER
Hub staff attempted to follow warm transfer process and was unsuccessful     Caller: Jose Hurtado    Relationship to patient: Self    Best call back number: [phone]     Patient is needing: PT RETURNING CALL FOR RESULTS

## 2025-05-14 DIAGNOSIS — G25.81 RLS (RESTLESS LEGS SYNDROME): Chronic | ICD-10-CM

## 2025-05-14 RX ORDER — APIXABAN 5 MG/1
5 TABLET, FILM COATED ORAL EVERY 12 HOURS SCHEDULED
Qty: 60 TABLET | Refills: 2 | Status: SHIPPED | OUTPATIENT
Start: 2025-05-14

## 2025-05-15 RX ORDER — METOCLOPRAMIDE 5 MG/1
5 TABLET ORAL 2 TIMES DAILY
Qty: 60 TABLET | Refills: 5 | Status: SHIPPED | OUTPATIENT
Start: 2025-05-15

## 2025-05-15 NOTE — TELEPHONE ENCOUNTER
Okay for refill on Reglan 5 mg to be taken twice daily #60 with 5 refills per Dr Millan.    [facility] script escribed to pt's pharmacy.

## 2025-05-16 RX ORDER — PRAMIPEXOLE DIHYDROCHLORIDE 1.5 MG/1
TABLET ORAL
Qty: 180 TABLET | Refills: 1 | Status: SHIPPED | OUTPATIENT
Start: 2025-05-16

## 2025-05-29 DIAGNOSIS — C61 MALIGNANT NEOPLASM OF PROSTATE: ICD-10-CM

## 2025-05-29 DIAGNOSIS — D50.9 IRON DEFICIENCY ANEMIA, UNSPECIFIED IRON DEFICIENCY ANEMIA TYPE: Primary | ICD-10-CM

## 2025-05-29 DIAGNOSIS — K90.9 MALABSORPTION OF IRON: ICD-10-CM

## 2025-05-30 ENCOUNTER — TELEPHONE (OUTPATIENT)
Dept: ONCOLOGY | Facility: CLINIC | Age: 75
End: 2025-05-30
Payer: MEDICARE

## 2025-06-02 ENCOUNTER — OFFICE VISIT (OUTPATIENT)
Dept: INTERNAL MEDICINE | Age: 75
End: 2025-06-02
Payer: MEDICARE

## 2025-06-02 VITALS
WEIGHT: 161 LBS | OXYGEN SATURATION: 96 % | HEIGHT: 76 IN | SYSTOLIC BLOOD PRESSURE: 102 MMHG | BODY MASS INDEX: 19.61 KG/M2 | TEMPERATURE: 97.1 F | HEART RATE: 88 BPM | DIASTOLIC BLOOD PRESSURE: 68 MMHG

## 2025-06-02 DIAGNOSIS — J44.9 CHRONIC OBSTRUCTIVE PULMONARY DISEASE, UNSPECIFIED COPD TYPE: Chronic | ICD-10-CM

## 2025-06-02 DIAGNOSIS — R29.898 WEAKNESS OF BOTH LEGS: ICD-10-CM

## 2025-06-02 DIAGNOSIS — M54.16 LUMBAR RADICULOPATHY, ACUTE: ICD-10-CM

## 2025-06-02 DIAGNOSIS — I48.91 ATRIAL FIBRILLATION, UNSPECIFIED TYPE: Chronic | ICD-10-CM

## 2025-06-02 DIAGNOSIS — E53.8 B12 DEFICIENCY: ICD-10-CM

## 2025-06-02 DIAGNOSIS — M47.26 OSTEOARTHRITIS OF SPINE WITH RADICULOPATHY, LUMBAR REGION: Chronic | ICD-10-CM

## 2025-06-02 DIAGNOSIS — F33.41 RECURRENT MAJOR DEPRESSIVE DISORDER, IN PARTIAL REMISSION: Primary | Chronic | ICD-10-CM

## 2025-06-02 PROBLEM — I10 HYPERTENSION: Chronic | Status: RESOLVED | Noted: 2018-04-12 | Resolved: 2025-06-02

## 2025-06-02 PROCEDURE — 1160F RVW MEDS BY RX/DR IN RCRD: CPT | Performed by: INTERNAL MEDICINE

## 2025-06-02 PROCEDURE — 99214 OFFICE O/P EST MOD 30 MIN: CPT | Performed by: INTERNAL MEDICINE

## 2025-06-02 PROCEDURE — 1159F MED LIST DOCD IN RCRD: CPT | Performed by: INTERNAL MEDICINE

## 2025-06-02 PROCEDURE — G2211 COMPLEX E/M VISIT ADD ON: HCPCS | Performed by: INTERNAL MEDICINE

## 2025-06-02 PROCEDURE — 1125F AMNT PAIN NOTED PAIN PRSNT: CPT | Performed by: INTERNAL MEDICINE

## 2025-06-02 RX ORDER — CYANOCOBALAMIN 1000 UG/ML
1000 INJECTION, SOLUTION INTRAMUSCULAR; SUBCUTANEOUS
Qty: 4 ML | Refills: 5 | Status: SHIPPED | OUTPATIENT
Start: 2025-06-02

## 2025-06-02 RX ORDER — GABAPENTIN 300 MG/1
300 CAPSULE ORAL 2 TIMES DAILY
Qty: 60 CAPSULE | Refills: 2 | Status: SHIPPED | OUTPATIENT
Start: 2025-06-02

## 2025-06-02 NOTE — PROGRESS NOTES
J  U  N  O  H    K  I  M ,   M  D                               I  N  T  E  R  N  A  L    M  E  D  I  C  I  N  E         ENCOUNTER DATE:  06/02/2025    Jose Hurtado / 75 y.o. / male    OFFICE VISIT ENCOUNTER       CHIEF COMPLAINT / REASON FOR OFFICE VISIT     Osteoarthritis, Depression, and Fatigue      ASSESSMENT & PLAN     Problem List Items Addressed This Visit          High    Recurrent major depressive disorder, in partial remission - Primary (Chronic)    Overview   Continue paroxetine 40 mg daily          Relevant Medications    PARoxetine (PAXIL) 40 MG tablet    Osteoarthritis of spine with radiculopathy, lumbar region (Chronic)    Overview   Continue gabapentin 300 mg 1 BID          Relevant Orders    Ambulatory Referral to Physical Therapy for Evaluation & Treatment       Medium    B12 deficiency (Chronic)    Overview   Continue B12 injection monthly (at home)         Relevant Medications    cyanocobalamin 1000 MCG/ML injection    Atrial fibrillation (Chronic)    Overview   Continue Eliquis 5 mg BID          Current Assessment & Plan   Resumed Eliquis 5 mg BID. Continue.          Chronic obstructive pulmonary disease (Chronic)    Overview   Continue Breztri inhale 2 puffs BID          Relevant Medications    Budeson-Glycopyrrol-Formoterol (Breztri Aerosphere) 160-9-4.8 MCG/ACT aerosol inhaler    albuterol sulfate  (90 Base) MCG/ACT inhaler     Other Visit Diagnoses         Lumbar radiculopathy, acute        Relevant Medications    gabapentin (NEURONTIN) 300 MG capsule      Weakness of both legs        Relevant Medications    gabapentin (NEURONTIN) 300 MG capsule    Other Relevant Orders    Ambulatory Referral to Physical Therapy for Evaluation & Treatment          Orders Placed This Encounter   Procedures    Ambulatory Referral to Physical Therapy for Evaluation & Treatment     Referral Priority:   Routine     Referral Type:   Physical Therapy      "Referral Reason:   Specialty Services Required     Requested Specialty:   Physical Therapy     Number of Visits Requested:   1     New Medications Ordered This Visit   Medications    cyanocobalamin 1000 MCG/ML injection     Sig: Inject 1 mL under the skin into the appropriate area as directed Every 30 (Thirty) Days. Indications: Inadequate Vitamin B12     Dispense:  4 mL     Refill:  5    gabapentin (NEURONTIN) 300 MG capsule     Sig: Take 1 capsule by mouth 2 (Two) Times a Day.     Dispense:  60 capsule     Refill:  2       SUMMARY/DISCUSSION        TOTAL TIME OF ENCOUNTER:        Next Appointment with me: Visit date not found     Return in about 4 months (around 10/2/2025) for Reassess chronic medical problems, Schedule AWV FOR AUGUST, (WITH APRN/PA).      VITAL SIGNS     Vitals:    06/02/25 1525   BP: 102/68   Pulse: 88   Temp: 97.1 °F (36.2 °C)   SpO2: 96%   Weight: 73 kg (161 lb)   Height: 193 cm (76\")       BP Readings from Last 3 Encounters:   06/02/25 102/68   04/16/25 120/68   04/11/25 119/65     Wt Readings from Last 3 Encounters:   06/02/25 73 kg (161 lb)   04/16/25 79.8 kg (176 lb)   04/11/25 76.2 kg (168 lb)     Body mass index is 19.6 kg/m².    Blood pressure readings recorded on patient flowsheet:       No data to display                  MEDICATIONS AT THE TIME OF OFFICE VISIT     Current Outpatient Medications on File Prior to Visit   Medication Sig    acetaminophen (TYLENOL) 325 MG tablet Take 2 tablets by mouth Every 6 (Six) Hours As Needed for Mild Pain, Headache or Fever.    albuterol sulfate  (90 Base) MCG/ACT inhaler Inhale 2 puffs 4 (Four) Times a Day.    apixaban (Eliquis) 5 MG tablet tablet TAKE ONE TABLET BY MOUTH EVERY 12 HOURS    atorvastatin (LIPITOR) 40 MG tablet Take 1 tablet by mouth Every Night.    Budeson-Glycopyrrol-Formoterol (Breztri Aerosphere) 160-9-4.8 MCG/ACT aerosol inhaler Inhale 2 puffs 2 (Two) Times a Day.    Cholecalciferol (Vitamin D3) 50 MCG (2000 UT) capsule " Take 1 capsule by mouth Daily.    metoclopramide (REGLAN) 5 MG tablet TAKE 1 TABLET BY MOUTH 2 TIMES A DAY    pantoprazole (PROTONIX) 40 MG EC tablet Take 1 tablet by mouth Daily.    PARoxetine (PAXIL) 40 MG tablet TAKE ONE TABLET BY MOUTH EVERY MORNING    polyethylene glycol (MIRALAX) 17 g packet Take 17 g by mouth Daily. (Patient taking differently: Take 17 g by mouth Daily.)    pramipexole (MIRAPEX) 1.5 MG tablet TAKE 1 TABLET BY MOUTH 2 TIMES A DAY FOR RESTLESS LEG SYNDROME    [DISCONTINUED] cyanocobalamin 1000 MCG/ML injection Inject 1 mL under the skin into the appropriate area as directed Every 30 (Thirty) Days. Indications: Inadequate Vitamin B12    [DISCONTINUED] gabapentin (NEURONTIN) 300 MG capsule Take 1 capsule by mouth 2 (Two) Times a Day.    [DISCONTINUED] cyclobenzaprine (FLEXERIL) 5 MG tablet Take 1 tablet by mouth 3 (Three) Times a Day As Needed for Muscle Spasms. (Patient not taking: Reported on 6/2/2025)    [DISCONTINUED] sucralfate (Carafate) 1 g tablet Take 1 tablet by mouth 4 (Four) Times a Day With Meals & at Bedtime. Dissolve pill in sm bit of water and drink (Patient not taking: Reported on 6/2/2025)     No current facility-administered medications on file prior to visit.          HISTORY OF PRESENT ILLNESS     Overall doing better. Taking all medications as prescribed. Depression stable on paroxetine 40 mg. Taking B12 injection monthly at home. Takes gabapentin 300 mg BID for spine related radicular pain and peripheral neuropathy. Pramipexole 1.5 mg for restless leg syndrome. Breztri 2 puffs BID for COPD. On Eliquis 5 mg BID.     Patient Care Team:  Brandin Bolton MD as PCP - General (Internal Medicine)  Tapan, George THORPE II, MD as Consulting Physician (Hematology and Oncology)  Jose Inman MD as Consulting Physician (Urology)  Mulugeta Millan MD as Consulting Physician (Gastroenterology)  Jose Christine III, MD as Referring Physician (Thoracic Surgery)  Seth Randhawa MD as  "Consulting Physician (Ophthalmology)  James Cyr MD (Inactive) as Consulting Physician (Cardiology)  Stephon Sommers MD as Consulting Physician (Sleep Medicine)  Rolanda Solomon MD as Consulting Physician (Nephrology)    REVIEW OF SYSTEMS     Review of Systems       PHYSICAL EXAMINATION     Physical Exam  Alert with normal thought and judgment.   Mood is stable overall.   Heart rate is normal   Pulm/Chest: Effort normal, breath sounds normal.       REVIEWED DATA     Labs:     Lab Results   Component Value Date     02/24/2025    K 4.2 02/24/2025    CALCIUM 9.2 02/24/2025    AST 19 02/24/2025    ALT 10 02/24/2025    BUN 22 02/24/2025    CREATININE 1.41 (H) 02/24/2025    CREATININE 1.29 (H) 06/20/2024    CREATININE 1.05 11/17/2023    EGFR 51.3 (L) 02/24/2025     Lab Results   Component Value Date    HGBA1C 5.50 08/06/2021    HGBA1C 6.20 (H) 01/17/2020   No results found for: \"MALBCRERATIO\"  Lab Results   Component Value Date    LDL 47 06/23/2023    LDL 52 05/12/2023    LDL 98 06/02/2022    HDL 62 (H) 06/23/2023    HDL 52 05/12/2023    TRIG 36 06/23/2023    CHOLHDLRATIO 2.23 05/12/2023     Lab Results   Component Value Date    TSH 1.850 02/24/2025    TSH 1.600 05/12/2023    TSH 2.710 01/17/2020    FREET4 1.45 02/24/2025    FREET4 1.36 05/12/2023    FREET4 1.52 01/17/2020     Lab Results   Component Value Date    WBC 3.71 03/14/2025    HGB 10.7 (L) 03/14/2025     03/14/2025           Imaging:               Medical Tests:               Summary of old records / correspondence / consultant report:             Request outside records:       "

## 2025-06-24 ENCOUNTER — TELEPHONE (OUTPATIENT)
Dept: INTERNAL MEDICINE | Age: 75
End: 2025-06-24
Payer: MEDICARE

## 2025-06-24 NOTE — TELEPHONE ENCOUNTER
----- Message from Brandin Bolton sent at 6/23/2025  6:31 PM EDT -----  Regarding: RE: Hydronephrosis  Information regarding hydronephrosis noted.  Please contact the patient and make sure he is following up with his kidney specialist in a timely manner.  Otherwise he may follow-up with me sooner if necessary.  ----- Message -----  From: Evie Malik APRN  Sent: 6/12/2025  12:39 PM EDT  To: Brandin Bolton MD  Subject: Hydronephrosis                                   Hi Dr Bolton, I am reaching out per Dr Sommers's request. Pt recently had CT chest which showed at least moderate dilation of the visualized portions of the bilateral renal collecting systems, possible hydronephrosis. Dr Sommers wanted to make sure you are aware.    Pt is seeing nephrology, Dr Bond. I will reach out to his office to make him aware as well.      Thanks.    Evie.

## 2025-06-27 ENCOUNTER — TREATMENT (OUTPATIENT)
Dept: PHYSICAL THERAPY | Facility: CLINIC | Age: 75
End: 2025-06-27
Payer: MEDICARE

## 2025-06-27 DIAGNOSIS — R26.89 BALANCE PROBLEM: ICD-10-CM

## 2025-06-27 DIAGNOSIS — R29.898 BILATERAL LEG WEAKNESS: ICD-10-CM

## 2025-06-27 DIAGNOSIS — G89.29 CHRONIC BILATERAL LOW BACK PAIN WITHOUT SCIATICA: Primary | ICD-10-CM

## 2025-06-27 DIAGNOSIS — M54.50 CHRONIC BILATERAL LOW BACK PAIN WITHOUT SCIATICA: Primary | ICD-10-CM

## 2025-06-27 DIAGNOSIS — R26.9 ABNORMALITY OF GAIT AND MOBILITY: ICD-10-CM

## 2025-06-27 NOTE — PROGRESS NOTES
Physical Therapy Daily Treatment Note  [address]  [facility] [address]  [phone]    Patient: Jose Hurtado   : 1950  Referring practitioner: Brandin Bolton MD  Date of Initial Visit: Type: THERAPY  Noted: 2025  Today's Date: 2025  Patient seen for 1 sessions       Visit Diagnoses:  No diagnosis found.    Jose Hurtado reports: ***      Objective          Active Range of Motion   Left Knee   Flexion: 120 degrees   Extensor la degrees     Right Knee   Flexion: 126 degrees   Extensor la degrees     Strength/Myotome Testing     Left Hip   Planes of Motion   Flexion: 4-  Abduction: 4-    Right Hip   Planes of Motion   Flexion: 4  Abduction: 4+    Left Knee   Extension: 5    Right Knee   Extension: 5    Left Ankle/Foot   Dorsiflexion: 5    Right Ankle/Foot   Dorsiflexion: 5    Additional Strength Details  Q angle L 10 ; 5     Functional Assessment     Comments  5x sts:28  TUG 20 sec      See Exercise, Manual, and Modality Logs for complete treatment.       Assessment/Plan  ***    {AMB PT PLAN (SOAP Note):98399}        Timed:         Manual Therapy:    ***     mins  42330;     Therapeutic Exercise:    ***     mins  85226;     Neuromuscular Wil:    ***    mins  80235;    Therapeutic Activity:     ***     mins  27965;     Gait Training:      ***     mins  38401;     Ultrasound:     ***     mins  12391;    Ionto                               ***    mins   63518  Self Care                       ***     mins   31359      Un-Timed:  Electrical Stimulation:    ***     mins  29288 (MC );  Dry Needling     ***     mins self-pay  Traction     ***     mins 45183  Canalith Repos    ***     mins 78724      Timed Treatment:   ***   mins   Total Treatment:     ***   mins    Caroline Vasquez, PT  Physical Therapist  [place] License # [id number]

## 2025-06-27 NOTE — PATIENT INSTRUCTIONS
Access Code: [email or web address]  URL: [email or web address]  Date: 06/27/2025  Prepared by: Caroline Vasquez    Exercises  - Supine Bridge  - 1 x daily - 7 x weekly - 3 sets - 10 reps  - Sidelying Hip Abduction  - 1 x daily - 7 x weekly - 3 sets - 10 reps  - Clamshell  - 1 x daily - 7 x weekly - 3 sets - 10 reps  - Modified Jose Stretch  - 1 x daily - 7 x weekly - 1 sets - 3 reps - 20 hold

## 2025-07-01 NOTE — PROGRESS NOTES
Physical Therapy Initial Evaluation and Plan of Care  [address]  [facility] [address]  [phone]    Patient: Jose Hurtado   : 1950  Diagnosis/ICD-10 Code:  Chronic bilateral low back pain without sciatica [M54.50, G89.29]  Referring practitioner: Brandin Bolton MD  Date of Initial Visit: 2025  Today's Date: 2025  Patient seen for 1 session         Visit Diagnoses:    ICD-10-CM ICD-9-CM   1. Chronic bilateral low back pain without sciatica  M54.50 724.2    G89.29 338.29   2. Bilateral leg weakness  R29.898 729.89   3. Abnormality of gait and mobility  R26.9 781.2   4. Balance problem  R26.89 781.99         Subjective Questionnaire: Oswestry: 62%      Subjective Evaluation    History of Present Illness  Mechanism of injury: I have lost 50# but have regained 30#. I fell 2x/last month at home. I hit my head and went to ER. I just didn't have an appetite for a while. Denies any other medical reasons for weight loss.  I use a cane and/or walking stick  My back doesn't hurt when sitting or laying. Hurts almost constantly when upright.  MD ordered MRI but need to have my bladder stim removed first  PMH: R TKA      Patient Occupation: retired          Objective          Static Posture   General Observations  Flexed.     Head  Forward.    Shoulders  Rounded.    Scapulae  Left protracted and right protracted.    Thoracic Spine  Hyperkyphosis.    Lumbar Spine   Flattened.     Knee   Knee (Left): Flexed.   Knee (Right): Flexed.     Postural Observations  Seated posture: fair  Standing posture: poor      Active Range of Motion     Lumbar   Flexion: Active lumbar flexion: difficulty returning to upright. WFL    Strength/Myotome Testing     Left Hip   Planes of Motion   Flexion: 4-  Left hip extension strength: NT.  Abduction: 4-    Right Hip   Planes of Motion   Flexion: 4  Right hip extension strength: NT.  Abduction: 4    Left Knee   Extension: 4    Right Knee   Extension: 5    Left Ankle/Foot    Dorsiflexion: 4+    Right Ankle/Foot   Dorsiflexion: 5    Ambulation   Weight-Bearing Status   Weight-Bearing Status (Left): full weight bearing   Weight-Bearing Status (Right): full weight-bearing    Assistive device used: single point cane    Ambulation: Level Surfaces   Ambulation with assistive device: independent  Ambulation without assistive device: contact guard assist    Observational Gait   Decreased walking speed and stride length.     Quality of Movement During Gait   Trunk  Forward lean.     Pelvis  Anterior pelvic tilt.     Knee    Knee (Left): Positive valgus.   Knee (Right): Positive stiff knee.           Assessment & Plan       Assessment  Impairments: abnormal coordination, abnormal gait, abnormal muscle tone, abnormal or restricted ROM, activity intolerance, impaired balance, impaired physical strength, lacks appropriate home exercise program, pain with function and safety issue   Functional limitations: carrying objects, lifting, walking, uncomfortable because of pain, moving in bed, standing, stooping, reaching overhead and unable to perform repetitive tasks   Prognosis: fair    Goals  Plan Goals: Short Term Goals: ( 4 weeks)  1.Pt to be independent with HEP   2. Pt to exhibit improved L hip flexion and  abduction strength to 4/5 to assist with stability when standing  3. Pt to ambulate with walker with SBA for 75 feet to allow for improved independence at home  4. Pt to perform 10x sit to stand with min to no assist    Long Term Goals: (12 weeks)  1. Pt able to step over 6 low hurdles with use of rails and/or canes to allow for safer community ambulation  2. Pt to exhibit 5/5 hip abduction and extension strength to allow for prolonged standing and community ambulation  3. Pt to score < 50% on Oswestry  4. Pt to exhibit ability to walk 250 feet in clinic with cane and no complaints of back fatigue       Plan  Therapy options: will be seen for skilled therapy services  Planned therapy  interventions: abdominal trunk stabilization, ADL retraining, balance/weight-bearing training, body mechanics training, flexibility, gait training, home exercise program, joint mobilization, manual therapy, postural training, neuromuscular re-education, soft tissue mobilization, strengthening, stretching, therapeutic activities and transfer training  Frequency: 2x week  Duration in weeks: 8  Treatment plan discussed with: patient and family            Timed:         Manual Therapy:         mins  97572;     Therapeutic Exercise:    15     mins  35480;     Neuromuscular Wil:        mins  29986;    Therapeutic Activity:          mins  50023;     Gait Training:           mins  30622;     Ultrasound:          mins  72716;    Ionto                                   mins   83423  Self Care                            mins   83901      Un-Timed:  Electrical Stimulation:         mins  92649 (MC );  Dry Needling          mins self-pay  Traction          mins 57265  Canalith Repos         mins 04313      Timed Treatment:   15   mins   Total Treatment:     50   mins          PT: Caroline Vasquez PT     License Number: [id number]  Electronically signed by Caroline Vasquez PT, 07/01/25, 4:34 AM EDT    Certification Period: 7/1/2025 thru 9/28/2025  I certify that the therapy services are furnished while this patient is under my care.  The services outlined above are required by this patient, and will be reviewed every 90 days.         Physician Signature:__________________________________________________    PHYSICIAN: Brandin Bolton MD  NPI: [id number]                                      DATE:      Please sign and return via fax to [email or web address] . Thank you, [facility].

## 2025-07-03 ENCOUNTER — TREATMENT (OUTPATIENT)
Dept: PHYSICAL THERAPY | Facility: CLINIC | Age: 75
End: 2025-07-03
Payer: MEDICARE

## 2025-07-03 DIAGNOSIS — R29.898 BILATERAL LEG WEAKNESS: ICD-10-CM

## 2025-07-03 DIAGNOSIS — R26.89 BALANCE PROBLEM: ICD-10-CM

## 2025-07-03 DIAGNOSIS — R26.9 ABNORMALITY OF GAIT AND MOBILITY: ICD-10-CM

## 2025-07-03 DIAGNOSIS — M54.50 CHRONIC BILATERAL LOW BACK PAIN WITHOUT SCIATICA: Primary | ICD-10-CM

## 2025-07-03 DIAGNOSIS — G89.29 CHRONIC BILATERAL LOW BACK PAIN WITHOUT SCIATICA: Primary | ICD-10-CM

## 2025-07-03 NOTE — PROGRESS NOTES
Physical Therapy Daily Treatment Note  [facility] [place]   [address], [address]  [place] [address]  P: [phone]  F: [phone]    Patient: Jose Hurtado   : 1950  Referring practitioner: Brandin Bolton MD  Date of Initial Visit: Type: THERAPY  Noted: 2025  Today's Date: 7/3/2025  Patient seen for 2 sessions       Visit Diagnoses:    ICD-10-CM ICD-9-CM   1. Chronic bilateral low back pain without sciatica  M54.50 724.2    G89.29 338.29   2. Bilateral leg weakness  R29.898 729.89   3. Abnormality of gait and mobility  R26.9 781.2   4. Balance problem  R26.89 781.99         Subjective     Jose Hurtado reports: No new complaints this visit.         Objective   See Exercise, Manual, and Modality Logs for complete treatment.       Assessment:  Pt tolerated new addition of core/hip strengthening interventions well, with no adverse effects to lumbar region. Pt will continue to benefit from progressive strengthening program, gradually  incorporating more standing activities to improve gait mechanics. HEP updated and reviewed with pt, demonstrating good understanding.         Plan:  Progress per Plan of Care            Timed:         Manual Therapy:         mins  30132;     Therapeutic Exercise:    15     mins  79683;     Neuromuscular Wil:        mins  66136;    Therapeutic Activity:     15     mins  70367;     Gait Training:           mins  39021;     Ultrasound:          mins  63419;    Ionto                                   mins  87578  Self Care                            mins  08887    Un-Timed:  Electrical Stimulation:         mins  51606 (MC );  Traction          mins 88759        Timed Treatment:   30   mins   Total Treatment:     37   mins      Varun Mascorro, Physical Therapist Assistant  [place] License #: [id number]

## 2025-07-09 ENCOUNTER — TELEPHONE (OUTPATIENT)
Dept: PHYSICAL THERAPY | Facility: CLINIC | Age: 75
End: 2025-07-09

## 2025-07-09 NOTE — TELEPHONE ENCOUNTER
Caller: Jose Hurtado    Relationship: Self         What was the call regarding: BEEN SICK ALL MORNING

## 2025-07-11 ENCOUNTER — TREATMENT (OUTPATIENT)
Dept: PHYSICAL THERAPY | Facility: CLINIC | Age: 75
End: 2025-07-11
Payer: MEDICARE

## 2025-07-11 DIAGNOSIS — R26.9 ABNORMALITY OF GAIT AND MOBILITY: ICD-10-CM

## 2025-07-11 DIAGNOSIS — M54.50 CHRONIC BILATERAL LOW BACK PAIN WITHOUT SCIATICA: Primary | ICD-10-CM

## 2025-07-11 DIAGNOSIS — G89.29 CHRONIC BILATERAL LOW BACK PAIN WITHOUT SCIATICA: Primary | ICD-10-CM

## 2025-07-11 DIAGNOSIS — R29.898 BILATERAL LEG WEAKNESS: ICD-10-CM

## 2025-07-11 DIAGNOSIS — R26.89 BALANCE PROBLEM: ICD-10-CM

## 2025-07-11 NOTE — PROGRESS NOTES
Physical Therapy Daily Treatment Note  [address]  [facility] [address]  [phone]    Patient: Jose Hurtado   : 1950  Referring practitioner: Brandin Bolton MD  Date of Initial Visit: Type: THERAPY  Noted: 2025  Today's Date: 2025  Patient seen for 3 sessions       Visit Diagnoses:    ICD-10-CM ICD-9-CM   1. Chronic bilateral low back pain without sciatica  M54.50 724.2    G89.29 338.29   2. Bilateral leg weakness  R29.898 729.89   3. Abnormality of gait and mobility  R26.9 781.2   4. Balance problem  R26.89 781.99       Jose Hurtado reports: Exhausted from building a roof for patio and got a rescue dog  4 days ago and its keeping me busy. No change with back pain when upright      Objective   See Exercise, Manual, and Modality Logs for complete treatment.       Assessment/Plan  Pt very kyphotic and flexed posture when upright. Worked on L hip ABD/ER strength. Pt needs frequent verbal cues to keep head up and look straight ahead. Progressed to more standing exercises today. Pt tired with retrosteps and slightly short of breath    Progress per Plan of Care        Timed:         Manual Therapy:         mins  35339;     Therapeutic Exercise:    25     mins  89492;     Neuromuscular Wil:        mins  25442;    Therapeutic Activity:     15     mins  87710;     Gait Trainin     mins  94398;     Ultrasound:          mins  03334;    Ionto                                   mins   23829  Self Care                            mins   04191      Un-Timed:  Electrical Stimulation:         mins  63357 ( );  Dry Needling          mins self-pay  Traction          mins 95765  Canalith Repos         mins 78084      Timed Treatment:   48   mins   Total Treatment:     48   mins    Caroline Vasquez, PT  Physical Therapist  [place] License # [id number]

## 2025-07-14 ENCOUNTER — TELEPHONE (OUTPATIENT)
Dept: GASTROENTEROLOGY | Facility: CLINIC | Age: 75
End: 2025-07-14

## 2025-07-16 ENCOUNTER — TREATMENT (OUTPATIENT)
Dept: PHYSICAL THERAPY | Facility: CLINIC | Age: 75
End: 2025-07-16
Payer: MEDICARE

## 2025-07-16 DIAGNOSIS — R26.9 ABNORMALITY OF GAIT AND MOBILITY: ICD-10-CM

## 2025-07-16 DIAGNOSIS — R29.898 BILATERAL LEG WEAKNESS: ICD-10-CM

## 2025-07-16 DIAGNOSIS — G89.29 CHRONIC BILATERAL LOW BACK PAIN WITHOUT SCIATICA: Primary | ICD-10-CM

## 2025-07-16 DIAGNOSIS — M54.50 CHRONIC BILATERAL LOW BACK PAIN WITHOUT SCIATICA: Primary | ICD-10-CM

## 2025-07-16 DIAGNOSIS — R26.89 BALANCE PROBLEM: ICD-10-CM

## 2025-07-16 NOTE — PROGRESS NOTES
Physical Therapy Daily Treatment Note  [address]  [facility] [address]  [phone]    Patient: Jose Hurtado   : 1950  Referring practitioner: Brandin Bolton MD  Date of Initial Visit: Type: THERAPY  Noted: 2025  Today's Date: 2025  Patient seen for 4 sessions       Visit Diagnoses:    ICD-10-CM ICD-9-CM   1. Chronic bilateral low back pain without sciatica  M54.50 724.2    G89.29 338.29   2. Bilateral leg weakness  R29.898 729.89   3. Abnormality of gait and mobility  R26.9 781.2   4. Balance problem  R26.89 781.99       Jose Hurtado reports: I had bladder stimulator removed yesterday and now I can have my back MRI on Monday.       Objective   See Exercise, Manual, and Modality Logs for complete treatment.       Assessment/Plan  Used Mulligan strap for postural biofeedback with standing exercises. Added Paloff with 10#. ABle to do 10 reps on R side but only 5 on L. Tandem balance improved with R foot behind but difficult with L behind. Pt stated he thought the MRI results may help with knowing what new exercises to add. I discussed with him that his primary issues right now are posture, hip flexor tightness, glut  weakness and thoracic  kyphosis and that MRI results probably wont affect the PT plan of care    Progress per Plan of Care        Timed:         Manual Therapy:         mins  18232;     Therapeutic Exercise:    25     mins  47916;     Neuromuscular Wil:    8    mins  24619;    Therapeutic Activity:     10     mins  09941;     Gait Training:           mins  21729;     Ultrasound:          mins  57810;    Ionto                                   mins   43941  Self Care                            mins   03272      Un-Timed:  Electrical Stimulation:         mins  83835 ( );  Dry Needling          mins self-pay  Traction          mins 27197  Canalith Repos         mins 45174      Timed Treatment:   43   mins   Total Treatment:     43   mins    Caroline Vasquez,  PT  Physical Therapist  [place] License # [id number]

## 2025-07-17 ENCOUNTER — ANESTHESIA (OUTPATIENT)
Dept: GASTROENTEROLOGY | Facility: HOSPITAL | Age: 75
End: 2025-07-17
Payer: MEDICARE

## 2025-07-17 ENCOUNTER — ANESTHESIA EVENT (OUTPATIENT)
Dept: GASTROENTEROLOGY | Facility: HOSPITAL | Age: 75
End: 2025-07-17
Payer: MEDICARE

## 2025-07-17 ENCOUNTER — HOSPITAL ENCOUNTER (OUTPATIENT)
Facility: HOSPITAL | Age: 75
Setting detail: HOSPITAL OUTPATIENT SURGERY
Discharge: HOME | End: 2025-07-17
Attending: INTERNAL MEDICINE | Admitting: INTERNAL MEDICINE
Payer: MEDICARE

## 2025-07-17 VITALS
SYSTOLIC BLOOD PRESSURE: 100 MMHG | BODY MASS INDEX: 20.24 KG/M2 | OXYGEN SATURATION: 100 % | HEART RATE: 63 BPM | WEIGHT: 171.45 LBS | HEIGHT: 77 IN | RESPIRATION RATE: 18 BRPM | DIASTOLIC BLOOD PRESSURE: 61 MMHG

## 2025-07-17 DIAGNOSIS — K25.7 CHRONIC GASTRIC ULCER, UNSPECIFIED WHETHER GASTRIC ULCER HEMORRHAGE OR PERFORATION PRESENT: ICD-10-CM

## 2025-07-17 PROCEDURE — 25010000002 PROPOFOL 200 MG/20ML EMULSION: Performed by: NURSE ANESTHETIST, CERTIFIED REGISTERED

## 2025-07-17 PROCEDURE — 43239 EGD BIOPSY SINGLE/MULTIPLE: CPT | Performed by: INTERNAL MEDICINE

## 2025-07-17 PROCEDURE — 25810000003 LACTATED RINGERS PER 1000 ML: Performed by: INTERNAL MEDICINE

## 2025-07-17 PROCEDURE — S0260 H&P FOR SURGERY: HCPCS | Performed by: INTERNAL MEDICINE

## 2025-07-17 PROCEDURE — 25010000002 PROPOFOL 1000 MG/100ML EMULSION: Performed by: NURSE ANESTHETIST, CERTIFIED REGISTERED

## 2025-07-17 PROCEDURE — 88305 TISSUE EXAM BY PATHOLOGIST: CPT | Performed by: INTERNAL MEDICINE

## 2025-07-17 PROCEDURE — 25010000002 GLYCOPYRROLATE 0.2 MG/ML SOLUTION: Performed by: NURSE ANESTHETIST, CERTIFIED REGISTERED

## 2025-07-17 PROCEDURE — 88312 SPECIAL STAINS GROUP 1: CPT | Performed by: INTERNAL MEDICINE

## 2025-07-17 RX ORDER — GLYCOPYRROLATE 0.2 MG/ML
INJECTION INTRAMUSCULAR; INTRAVENOUS AS NEEDED
Status: DISCONTINUED | OUTPATIENT
Start: 2025-07-17 | End: 2025-07-17 | Stop reason: SURG

## 2025-07-17 RX ORDER — PROPOFOL 10 MG/ML
INJECTION, EMULSION INTRAVENOUS AS NEEDED
Status: DISCONTINUED | OUTPATIENT
Start: 2025-07-17 | End: 2025-07-17 | Stop reason: SURG

## 2025-07-17 RX ORDER — PROPOFOL 10 MG/ML
INJECTION, EMULSION INTRAVENOUS CONTINUOUS PRN
Status: DISCONTINUED | OUTPATIENT
Start: 2025-07-17 | End: 2025-07-17 | Stop reason: SURG

## 2025-07-17 RX ORDER — SODIUM CHLORIDE, SODIUM LACTATE, POTASSIUM CHLORIDE, CALCIUM CHLORIDE 600; 310; 30; 20 MG/100ML; MG/100ML; MG/100ML; MG/100ML
30 INJECTION, SOLUTION INTRAVENOUS CONTINUOUS
Status: DISCONTINUED | OUTPATIENT
Start: 2025-07-17 | End: 2025-07-17 | Stop reason: HOSPADM

## 2025-07-17 RX ADMIN — SODIUM CHLORIDE, POTASSIUM CHLORIDE, SODIUM LACTATE AND CALCIUM CHLORIDE 30 ML/HR: 600; 310; 30; 20 INJECTION, SOLUTION INTRAVENOUS at 07:52

## 2025-07-17 RX ADMIN — PROPOFOL INJECTABLE EMULSION 50 MG: 10 INJECTION, EMULSION INTRAVENOUS at 08:09

## 2025-07-17 RX ADMIN — GLYCOPYRROLATE 0.2 MG: 0.2 INJECTION INTRAMUSCULAR; INTRAVENOUS at 08:05

## 2025-07-17 RX ADMIN — PROPOFOL 250 MCG/KG/MIN: 10 INJECTION, EMULSION INTRAVENOUS at 08:10

## 2025-07-17 NOTE — ANESTHESIA PREPROCEDURE EVALUATION
Anesthesia Evaluation     Patient summary reviewed and Nursing notes reviewed   NPO Solid Status: > 8 hours  NPO Liquid Status: > 4 hours           Airway   Mallampati: II  TM distance: >3 FB  Neck ROM: full  No difficulty expected  Comment: Grade I view with MAC 4  Dental    (+) upper dentures, partials and poor dentition    Comment: Lower partials    Pulmonary - normal exam    breath sounds clear to auscultation  (+) a smoker Former, COPD,shortness of breath, sleep apnea on CPAP  Cardiovascular - normal exam    ECG reviewed  Rhythm: regular  Rate: normal    (+) hypertension less than 2 medications, past MI , dysrhythmias Paroxysmal Atrial Fib, CHF , DVT, hyperlipidemia    ROS comment: EF 56% by ECHO 5/22/normal coronaries by cath 5/22/patient denies hx of MI    Neuro/Psych  (+) tremors, numbness, psychiatric history    ROS Comment: RLS/peripheral polyneuropathy  GI/Hepatic/Renal/Endo    (+) hiatal hernia, PUD, GI bleeding , renal disease- CRI and stones    ROS Comment: Hx esophagectomy for CA/gastroparesis/Pardo's    Musculoskeletal     Abdominal  - normal exam   Substance History      OB/GYN          Other   blood dyscrasia anemia,   history of cancer remission      Other Comment: Hx prostate CA/esophageal CA, s/p esophagectomy/antiphospholipid syndrome/Hgb 10.7                Anesthesia Plan    ASA 4     MAC     intravenous induction     Anesthetic plan, risks, benefits, and alternatives have been provided, discussed and informed consent has been obtained with: patient.      CODE STATUS:

## 2025-07-17 NOTE — ANESTHESIA POSTPROCEDURE EVALUATION
"Patient: Jose Hurtado    Procedure Summary       Date: 07/17/25 Room / Location:  [facility]    Anesthesia Start: 0800 Anesthesia Stop: 0821    Procedure: ESOPHAGOGASTRODUODENOSCOPY with cold biopsy (Esophagus) Diagnosis:       Hx of esophagectomy      Gastric bezoar      Duodenogastric bile reflux      (Chronic gastric ulcer, unspecified whether gastric ulcer hemorrhage or perforation present [K25.7])    Surgeons: Mulugeta Millan MD Provider: Eric Watts MD    Anesthesia Type: MAC ASA Status: 4            Anesthesia Type: MAC    Vitals  Vitals Value Taken Time   /61 07/17/25 08:41   Temp     Pulse 63 07/17/25 08:44   Resp 18 07/17/25 08:41   SpO2 100 % 07/17/25 08:44   Vitals shown include unfiled device data.        Post Anesthesia Care and Evaluation    Patient location during evaluation: bedside  Patient participation: complete - patient participated  Level of consciousness: sleepy but conscious  Pain score: 0  Pain management: adequate    Airway patency: patent  Anesthetic complications: No anesthetic complications    Cardiovascular status: acceptable  Respiratory status: acceptable  Hydration status: acceptable    Comments: /61 (BP Location: Left arm, Patient Position: Lying)   Pulse 63   Resp 18   Ht 195.6 cm (77\")   Wt 77.8 kg (171 lb 7.2 oz)   SpO2 100%   BMI 20.33 kg/m²       "

## 2025-07-17 NOTE — H&P
[facility]  Pre Procedure History & Physical    Chief Complaint:   Esophageal ulcer, history of esophageal cancer, Pardo's esophagus    Subjective     HPI:   This 75-year-old male presents to the endoscopy suite for upper endoscopic evaluation.  He has a prior history of esophageal cancer requiring resection and reanastomosis.  Also had endoscopy performed in April that revealed esophageal ulcer.  This is follow-up to assess ulcer healing.    Past Medical History:   Past Medical History:   Diagnosis Date    Acute deep vein thrombosis (DVT) of popliteal vein of left lower extremity 03/24/2020    Anemia     Anti-phospholipid syndrome     On lifelong anticoagulation therapy    Bladder disorder     LEAKAGE   ON MED  wers pads    Bleeding hemorrhoids     Chronic kidney disease     Community acquired pneumonia     HISTORY OF IN 2014    Congestive heart failure     COPD (chronic obstructive pulmonary disease)     Deep venous thrombosis 2006, 2008    Left lower extremity multiple    Depression     Esophageal carcinoma 12/31/2014    had chemo and radiation prior surgery    Gastroparesis     Hemorrhoids     HH (hiatus hernia)     History of atrial fibrillation 2015    ONE EPISODE WHILE HOSPITALIZED    History of kidney stones     History of nephrolithiasis     History of pancreatitis     PT STATES MANY YEARS AGO    History of radiation therapy     History of transfusion     Hypertension     Long-term (current) use of anticoagulants, INR goal 2.0-3.0     Lymphedema     Malignant neoplasm of prostate     Other hyperlipidemia 01/30/2018 January 30, 2018 lipid panel risk 12.8%    Restless legs syndrome     Sleep apnea     OCCASIONALLY WEARS CPAP    Squamous carcinoma     on the head       Past Surgical History:  Past Surgical History:   Procedure Laterality Date    APPENDECTOMY  1950    BRONCHOSCOPY      (Diagnostic)    CARDIAC CATHETERIZATION N/A 05/14/2022    Procedure: Left Heart Cath;  Surgeon:  Eric So MD;  Location: [facility] CATH INVASIVE LOCATION;  Service: Cardiology;  Laterality: N/A;    CARDIAC CATHETERIZATION N/A 05/14/2022    Procedure: Coronary angiography;  Surgeon: Eric So MD;  Location: [facility] CATH INVASIVE LOCATION;  Service: Cardiology;  Laterality: N/A;    CARDIAC CATHETERIZATION N/A 05/14/2022    Procedure: Left ventriculography;  Surgeon: Eric So MD;  Location: [facility] CATH INVASIVE LOCATION;  Service: Cardiology;  Laterality: N/A;    CATARACT EXTRACTION Bilateral 2014    COLONOSCOPY  12/15/2014    Complete / Description: EH, IH, torts, stool, follow-up colonoscopy due in 5 years.    COLONOSCOPY N/A 06/13/2017    non-thrombosed external hemorrhoids, normal examined ileum, IH    COLONOSCOPY N/A 02/26/2019    Procedure: COLONOSCOPY with Cold Polypectomy;  Surgeon: Mulugeta Millan MD;  Location: [facility] ENDOSCOPY;  Service: Gastroenterology    COLONOSCOPY N/A 12/16/2020    Procedure: COLONOSCOPY to cecum into TI;  Surgeon: Mesha Sanchez MD;  Location: [facility] ENDOSCOPY;  Service: Gastroenterology;  Laterality: N/A;  pre: lower GI bleed  post: hemorrhoids     CYSTOSCOPY W/ LASER LITHOTRIPSY      ENDOSCOPY N/A 06/13/2017    Procedure: ESOPHAGOGASTRODUODENOSCOPY WITH COLD BIOPSY;  Surgeon: Lake Gonzalez MD;  Location: [facility] ENDOSCOPY;  Service:     ENDOSCOPY N/A 11/18/2021    Procedure: ESOPHAGOGASTRODUODENOSCOPY WITH  DILATATION WITH FLUOROSCOPY;  Surgeon: Jose Christine III, MD;  Location: [facility] ENDOSCOPY;  Service: Gastroenterology;  Laterality: N/A;  Pre: dysphagia, h/x of adinocarcinoma of esophagus  Post: same    ENDOSCOPY N/A 08/07/2023    Procedure: ESOPHAGOGASTRODUODENOSCOPY;  Surgeon: Jameel Valencia MD;  Location: [facility] ENDOSCOPY;  Service: Gastroenterology;  Laterality: N/A;  PRE- DYSPHAGIA  POST-ABORTED DUE TO RETAINED FOOD    ENDOSCOPY N/A 08/08/2023    Procedure: ESOPHAGOGASTRODUODENOSCOPY with bx;  Surgeon: Annie  Jameel CASTILLO MD;  Location: [facility] ENDOSCOPY;  Service: Gastroenterology;  Laterality: N/A;  pre: N/V, abd pain  post: esophageal nodule, retained food    ENDOSCOPY N/A 12/12/2024    Procedure: ESOPHAGOGASTRODUODENOSCOPY with cold biopsies;  Surgeon: Mulugeta Millan MD;  Location: [facility] ENDOSCOPY;  Service: Gastroenterology;  Laterality: N/A;  pre: Nausea, gastroparesis, history of esophageal cancer with pull-through, Shrestha's esophagus  post: inflammation @GE junction anastamosis, retained gastric content    ENDOSCOPY N/A 4/11/2025    Procedure: ESOPHAGOGASTRODUODENOSCOPY WITH BIOPSIES;  Surgeon: Mulugeta Millan MD;  Location: [facility] ENDOSCOPY;  Service: Gastroenterology;  Laterality: N/A;  PRE- ULCER AT ANSTOMOSIS, SHRESTHA'S ESOPHAGUS  POST- SURGICAL CHANGES, ANASTOMOTIC GASTRIC ULCERATION    ESOPHAGECTOMY      April 2015, stage IIB esophageal carcinoma, sub-total resection.    ESOPHAGECTOMY      Esophagectomy Subtotal [place] Procedure    EXCISION LESION  08/2012    Removal of Squamous Cell CA on Head    HAMMER TOE REPAIR  09/2014    Hammertoe Operation (Each Toe), 10/2014    HAMMER TOE REPAIR Left 10/03/2017    Procedure: Left second third and fourth distal interphalangeal joint resection with flexor tenotomy;  Surgeon: Mulugeta Lira MD;  Location: [facility] OR OSC;  Service:     HEMORRHOIDECTOMY N/A 12/23/2020    Procedure: HEMORRHOIDECTOMY;  Surgeon: Billy Julio Jr., MD;  Location: [facility] MAIN OR;  Service: General;  Laterality: N/A;    HERNIA REPAIR      incisional    JEJUNOSTOMY      Laparoscopic    JEJUNOSTOMY      tube removal     KNEE SURGERY Bilateral 1967, 1973, 1981    PATELLA SURGERY Left     removed    PILONIDAL CYST / SINUS EXCISION      PA ARTHRP KNE CONDYLE&PLATU MEDIAL&LAT COMPARTMENTS Right 03/26/2018    Procedure: TOTAL KNEE ARTHROPLASTY;  Surgeon: Renny Solis MD;  Location: [facility] MAIN OR;  Service: Orthopedics    PROSTATECTOMY  2010    PYLOROPLASTY       SIGMOIDOSCOPY N/A 2023    Procedure: SIGMOIDOSCOPY FLEXIBLE;  Surgeon: Mesha Sanchez MD;  Location:  [facility];  Service: Gastroenterology;  Laterality: N/A;  PRE- ABNORMAL CT  POST- HEMORRHOIDS, ULCERATION    SPINAL FUSION  1998    C 5,6    TONSILLECTOMY      UPPER GASTROINTESTINAL ENDOSCOPY  12/15/2014    LA Grade D esophagitis, Pardo's, HH, multiple duodenal ulcers    VENTRAL/INCISIONAL HERNIA REPAIR N/A 2016    Procedure: VENTRAL/INCISIONAL HERNIA REPAIR, open, with mesh, and component separation;  Surgeon: Darren Rivas MD;  Location: [facility] MAIN OR;  Service:        Family History:  Family History   Problem Relation Age of Onset    Cerebral aneurysm Mother         cerebral artery aneurysm ( age 56)    Anxiety disorder Father     Suicide Attempts Father          of suicide    Cancer Father         bladder    Prostate cancer Brother 68    No Known Problems Brother     Pancreatic cancer Nephew     Colon cancer Neg Hx     Esophageal cancer Neg Hx     Dementia Neg Hx     Malig Hyperthermia Neg Hx        Social History:   reports that he quit smoking about 51 years ago. His smoking use included cigarettes. He started smoking about 54 years ago. He has a 6 pack-year smoking history. He has been exposed to tobacco smoke. He has quit using smokeless tobacco.  His smokeless tobacco use included chew. He reports current alcohol use of about 3.0 standard drinks of alcohol per week. He reports that he does not use drugs.    Medications:   Medications Prior to Admission   Medication Sig Dispense Refill Last Dose/Taking    acetaminophen (TYLENOL) 325 MG tablet Take 2 tablets by mouth Every 6 (Six) Hours As Needed for Mild Pain, Headache or Fever.   Past Week    albuterol sulfate  (90 Base) MCG/ACT inhaler Inhale 2 puffs 4 (Four) Times a Day.   Past Week    Budeson-Glycopyrrol-Formoterol (Breztri Aerosphere) 160-9-4.8 MCG/ACT aerosol inhaler Inhale 2 puffs 2 (Two) Times a  Day. 10.7 g 5 Past Week    polyethylene glycol (MIRALAX) 17 g packet Take 17 g by mouth Daily. (Patient taking differently: Take 17 g by mouth Daily.) 30 each 0 Past Week    apixaban (Eliquis) 5 MG tablet tablet TAKE ONE TABLET BY MOUTH EVERY 12 HOURS 60 tablet 2 7/12/2025 Evening    atorvastatin (LIPITOR) 40 MG tablet Take 1 tablet by mouth Every Night. 90 tablet 2 7/12/2025    Cholecalciferol (Vitamin D3) 50 MCG (2000 UT) capsule Take 1 capsule by mouth Daily.   7/12/2025    cyanocobalamin 1000 MCG/ML injection Inject 1 mL under the skin into the appropriate area as directed Every 30 (Thirty) Days. Indications: Inadequate Vitamin B12 4 mL 5 7/12/2025    gabapentin (NEURONTIN) 300 MG capsule Take 1 capsule by mouth 2 (Two) Times a Day. 60 capsule 2 7/12/2025    metoclopramide (REGLAN) 5 MG tablet TAKE 1 TABLET BY MOUTH 2 TIMES A DAY 60 tablet 5 7/12/2025    pantoprazole (PROTONIX) 40 MG EC tablet Take 1 tablet by mouth Daily. 90 tablet 1 7/12/2025    PARoxetine (PAXIL) 40 MG tablet TAKE ONE TABLET BY MOUTH EVERY MORNING 30 tablet 5 7/12/2025    pramipexole (MIRAPEX) 1.5 MG tablet TAKE 1 TABLET BY MOUTH 2 TIMES A DAY FOR RESTLESS LEG SYNDROME 180 tablet 1 7/12/2025       Allergies:  Patient has no known allergies.    ROS:    Pertinent items are noted in HPI     Objective     There were no vitals taken for this visit.    Physical Exam   Constitutional: Pt is oriented to person, place, and time and well-developed, well-nourished, and in no distress.   Mouth/Throat: Oropharynx is clear and moist.   Neck: Normal range of motion.   Cardiovascular: Normal rate, regular rhythm and normal heart sounds.    Pulmonary/Chest: Effort normal and breath sounds normal.   Abdominal: Soft. Nontender  Skin: Skin is warm and dry.   Psychiatric: Mood, memory, affect and judgment normal.     Assessment & Plan     Diagnosis:  Esophageal ulcer  History of esophageal cancer  Pardo's esophagus    Anticipated Surgical Procedure:  EGD    The  risks, benefits, and alternatives of this procedure have been discussed with the patient or the responsible party- the patient understands and agrees to proceed.

## 2025-07-17 NOTE — DISCHARGE INSTRUCTIONS
For the next 24 hours patient needs to be with a responsible adult.    For THE REST OF TODAY DO NOT drive, operate machinery, appliances, drink alcohol, make important decisions or sign legal documents.    Start with a light or bland diet if you are feeling sick to your stomach otherwise advance to regular diet as tolerated.    Follow recommendations on procedure report if provided by your doctor.    Call Dr Millan for problems [phone]    Problems may include but not limited to: large amounts of bleeding, trouble breathing, repeated vomiting, severe unrelieved pain, fever or chills.      If biopsies or polyps were taken, MD will call you with the results in about 7 days. If you don't hear from the MD in 2 weeks, call the number above.

## 2025-07-18 ENCOUNTER — TELEPHONE (OUTPATIENT)
Dept: PHYSICAL THERAPY | Facility: CLINIC | Age: 75
End: 2025-07-18

## 2025-07-18 LAB
CYTO UR: NORMAL
LAB AP CASE REPORT: NORMAL
LAB AP DIAGNOSIS COMMENT: NORMAL
PATH REPORT.ADDENDUM SPEC: NORMAL
PATH REPORT.FINAL DX SPEC: NORMAL
PATH REPORT.GROSS SPEC: NORMAL

## 2025-07-18 NOTE — TELEPHONE ENCOUNTER
Caller: Jose Hurtado    Relationship: Self        What was the call regarding: UNABLE TO COME

## 2025-07-21 ENCOUNTER — HOSPITAL ENCOUNTER (OUTPATIENT)
Dept: MRI IMAGING | Facility: HOSPITAL | Age: 75
Discharge: HOME | End: 2025-07-21
Payer: MEDICARE

## 2025-07-21 ENCOUNTER — TELEPHONE (OUTPATIENT)
Dept: GASTROENTEROLOGY | Facility: CLINIC | Age: 75
End: 2025-07-21

## 2025-07-21 DIAGNOSIS — M51.369 NEUROLOGIC DISORDER DUE TO DEGENERATION OF LUMBAR INTERVERTEBRAL DISC: ICD-10-CM

## 2025-07-21 DIAGNOSIS — R29.818 NEUROLOGIC DISORDER DUE TO DEGENERATION OF LUMBAR INTERVERTEBRAL DISC: ICD-10-CM

## 2025-07-22 ENCOUNTER — TREATMENT (OUTPATIENT)
Dept: PHYSICAL THERAPY | Facility: CLINIC | Age: 75
End: 2025-07-22
Payer: MEDICARE

## 2025-07-22 DIAGNOSIS — R26.9 ABNORMALITY OF GAIT AND MOBILITY: ICD-10-CM

## 2025-07-22 DIAGNOSIS — G89.29 CHRONIC BILATERAL LOW BACK PAIN WITHOUT SCIATICA: Primary | ICD-10-CM

## 2025-07-22 DIAGNOSIS — R26.89 BALANCE PROBLEM: ICD-10-CM

## 2025-07-22 DIAGNOSIS — R29.898 BILATERAL LEG WEAKNESS: ICD-10-CM

## 2025-07-22 DIAGNOSIS — M54.50 CHRONIC BILATERAL LOW BACK PAIN WITHOUT SCIATICA: Primary | ICD-10-CM

## 2025-07-22 PROCEDURE — 97530 THERAPEUTIC ACTIVITIES: CPT

## 2025-07-22 PROCEDURE — 97110 THERAPEUTIC EXERCISES: CPT

## 2025-07-22 PROCEDURE — 97112 NEUROMUSCULAR REEDUCATION: CPT

## 2025-07-22 NOTE — PROGRESS NOTES
Physical Therapy Daily Treatment Note  [facility] [place]   [address], [address]  [place] [address]  P: [phone]  F: [phone]    Patient: Jose Hurtado   : 1950  Referring practitioner: Brandin Bolton MD  Date of Initial Visit: Type: THERAPY  Noted: 2025  Today's Date: 2025  Patient seen for 5 sessions       Visit Diagnoses:    ICD-10-CM ICD-9-CM   1. Chronic bilateral low back pain without sciatica  M54.50 724.2    G89.29 338.29   2. Bilateral leg weakness  R29.898 729.89   3. Abnormality of gait and mobility  R26.9 781.2   4. Balance problem  R26.89 781.99       Subjective     Jose Hurtado reports: had stimulator removed, but they left parts in there so he was unable to get his MRI. Has been walking using both walking sticks bilaterally.    Objective   See Exercise, Manual, and Modality Logs for complete treatment.       Assessment:  Displayed improvements today since last session. Was able to retro walk without UE support for some of the reps. Also he increased repetitions of Pallof press on weaker side. Unsure why he was unable to receive MRI and voiced he would have to reach out to them. He will continue to benefit from skilled PT services.      Plan:  Progress per POC.         Timed:         Manual Therapy:         mins  70462;     Therapeutic Exercise:    20     mins  58865;     Neuromuscular Wil:    12    mins  78924;    Therapeutic Activity:     10     mins  74519;     Gait Training:           mins  35277;     Ultrasound:          mins  17832;    Ionto                                   mins  99141  Self Care                            mins  66003  Traction          mins 37214      Un-Timed:  Canalith Repos         mins 62174  Electrical Stimulation:         mins  06050 (MC );  Dry Needling          mins self-pay  Traction          mins 92175        Timed Treatment:   42   mins   Total Treatment:     50   mins    George Ruggiero PT  [place] License  #: [id number]    Physical Therapist

## 2025-07-25 ENCOUNTER — TREATMENT (OUTPATIENT)
Dept: PHYSICAL THERAPY | Facility: CLINIC | Age: 75
End: 2025-07-25
Payer: MEDICARE

## 2025-07-25 DIAGNOSIS — R29.898 BILATERAL LEG WEAKNESS: ICD-10-CM

## 2025-07-25 DIAGNOSIS — G89.29 CHRONIC BILATERAL LOW BACK PAIN WITHOUT SCIATICA: Primary | ICD-10-CM

## 2025-07-25 DIAGNOSIS — M54.50 CHRONIC BILATERAL LOW BACK PAIN WITHOUT SCIATICA: Primary | ICD-10-CM

## 2025-07-25 DIAGNOSIS — R26.89 BALANCE PROBLEM: ICD-10-CM

## 2025-07-25 DIAGNOSIS — R26.9 ABNORMALITY OF GAIT AND MOBILITY: ICD-10-CM

## 2025-07-25 NOTE — PROGRESS NOTES
Physical Therapy Daily Treatment Note  [facility] [place]   [address], [address]  [place] [address]  P: [phone]  F: [phone]    Patient: Jose Hurtado   : 1950  Referring practitioner: Brandin Bolton MD  Date of Initial Visit: Type: THERAPY  Noted: 2025  Today's Date: 2025  Patient seen for 6 sessions       Visit Diagnoses:    ICD-10-CM ICD-9-CM   1. Chronic bilateral low back pain without sciatica  M54.50 724.2    G89.29 338.29   2. Bilateral leg weakness  R29.898 729.89   3. Abnormality of gait and mobility  R26.9 781.2   4. Balance problem  R26.89 781.99       Subjective     Jose Hurtado reports: he is a little tired today due to doing yard work this morning.    Objective   See Exercise, Manual, and Modality Logs for complete treatment.       Assessment:  Pt displayed improved ability to do exercises today with increased resistance and repetitions. He was fatigued more than usual due to his yard work this morning and most weight-bearing exercises we did not get to. Overall, he appears to be progressing and will benefit from continued skilled PT services.    The patient was part of group therapy with 2 patients.  The patient performed the exercises and activities designated in the Flow Sheet of the patient's chart.  As the patient's therapist I provided the following: Physical Cues, Technique Correction, and Posture Correction.  The treatment will help restore the function of the patient with postural and mobility deficits.  Group Therapy was performed for one time for 10 minutes.      Plan:  Progress per POC.         Timed:         Manual Therapy:         mins  72541;     Therapeutic Exercise:    16     mins  43372;     Neuromuscular Wil:        mins  18541;    Therapeutic Activity:     10     mins  37176;     Gait Training:           mins  40227;     Ultrasound:          mins  10987;    Ionto                                   mins  11482  Self Care                             mins  77780  Traction          mins 26061      Un-Timed:  Canalith Repos         mins 38364  Electrical Stimulation:         mins  58755 ( );  Dry Needling          mins self-pay  Traction          mins 25658  Group Therapy __10___ mins 77142        Timed Treatment:   26   mins   Total Treatment:     48   mins    George Ruggiero PT  [place] License #: [id number]    Physical Therapist

## 2025-07-28 ENCOUNTER — TELEPHONE (OUTPATIENT)
Dept: GASTROENTEROLOGY | Facility: CLINIC | Age: 75
End: 2025-07-28
Payer: MEDICARE

## 2025-07-28 ENCOUNTER — TELEPHONE (OUTPATIENT)
Dept: GASTROENTEROLOGY | Facility: CLINIC | Age: 75
End: 2025-07-28

## 2025-07-28 NOTE — TELEPHONE ENCOUNTER
Hub staff attempted to follow warm transfer process and was unsuccessful     Caller: Jose Hurtado    Relationship to patient: Self    Best call back number: [phone]    Patient is needing: RETURNING CALL TO [facility]

## 2025-07-28 NOTE — TELEPHONE ENCOUNTER
Hub staff attempted to follow warm transfer process and was unsuccessful     Caller: Jose Hurtado    Relationship to patient: Self    Best call back number: [phone] (home)       Patient is needing: QUESTIONS ABOUT TEST RESULTS, ASKED FOR NAZ. PLEASE CALL TO ADVISE.

## 2025-07-29 NOTE — TELEPHONE ENCOUNTER
Returned call to pt who is asking what he needs to do for his erosive gastritis.  Advised pt he should continue on the pantoprazole, pt reports he has not been taking this,  but he will start.  Pt stated he didn't need a refill at this time.

## 2025-07-30 ENCOUNTER — TELEPHONE (OUTPATIENT)
Dept: ORTHOPEDICS | Facility: OTHER | Age: 75
End: 2025-07-30
Payer: MEDICARE

## 2025-08-01 ENCOUNTER — TREATMENT (OUTPATIENT)
Dept: PHYSICAL THERAPY | Facility: CLINIC | Age: 75
End: 2025-08-01
Payer: MEDICARE

## 2025-08-01 DIAGNOSIS — G89.29 CHRONIC BILATERAL LOW BACK PAIN WITHOUT SCIATICA: Primary | ICD-10-CM

## 2025-08-01 DIAGNOSIS — M54.50 CHRONIC BILATERAL LOW BACK PAIN WITHOUT SCIATICA: Primary | ICD-10-CM

## 2025-08-01 DIAGNOSIS — R26.9 ABNORMALITY OF GAIT AND MOBILITY: ICD-10-CM

## 2025-08-01 DIAGNOSIS — R26.89 BALANCE PROBLEM: ICD-10-CM

## 2025-08-01 DIAGNOSIS — R29.898 BILATERAL LEG WEAKNESS: ICD-10-CM

## 2025-08-01 PROCEDURE — 97112 NEUROMUSCULAR REEDUCATION: CPT | Performed by: PHYSICAL THERAPIST

## 2025-08-01 PROCEDURE — 97530 THERAPEUTIC ACTIVITIES: CPT | Performed by: PHYSICAL THERAPIST

## 2025-08-01 PROCEDURE — 97110 THERAPEUTIC EXERCISES: CPT | Performed by: PHYSICAL THERAPIST

## 2025-08-06 ENCOUNTER — TREATMENT (OUTPATIENT)
Dept: PHYSICAL THERAPY | Facility: CLINIC | Age: 75
End: 2025-08-06
Payer: MEDICARE

## 2025-08-06 DIAGNOSIS — R26.9 ABNORMALITY OF GAIT AND MOBILITY: ICD-10-CM

## 2025-08-06 DIAGNOSIS — G89.29 CHRONIC BILATERAL LOW BACK PAIN WITHOUT SCIATICA: Primary | ICD-10-CM

## 2025-08-06 DIAGNOSIS — M54.50 CHRONIC BILATERAL LOW BACK PAIN WITHOUT SCIATICA: Primary | ICD-10-CM

## 2025-08-06 DIAGNOSIS — R29.898 BILATERAL LEG WEAKNESS: ICD-10-CM

## 2025-08-06 DIAGNOSIS — R26.89 BALANCE PROBLEM: ICD-10-CM

## 2025-08-11 ENCOUNTER — TELEPHONE (OUTPATIENT)
Dept: INTERNAL MEDICINE | Age: 75
End: 2025-08-11
Payer: MEDICARE

## 2025-08-12 ENCOUNTER — OFFICE VISIT (OUTPATIENT)
Dept: ONCOLOGY | Facility: CLINIC | Age: 75
End: 2025-08-12
Payer: MEDICARE

## 2025-08-12 ENCOUNTER — LAB (OUTPATIENT)
Dept: OTHER | Facility: HOSPITAL | Age: 75
End: 2025-08-12
Payer: MEDICARE

## 2025-08-12 VITALS
HEART RATE: 82 BPM | TEMPERATURE: 97.7 F | HEIGHT: 77 IN | WEIGHT: 171.3 LBS | SYSTOLIC BLOOD PRESSURE: 133 MMHG | BODY MASS INDEX: 20.23 KG/M2 | OXYGEN SATURATION: 97 % | DIASTOLIC BLOOD PRESSURE: 69 MMHG

## 2025-08-12 DIAGNOSIS — K90.9 MALABSORPTION OF IRON: ICD-10-CM

## 2025-08-12 DIAGNOSIS — C61 MALIGNANT NEOPLASM OF PROSTATE: ICD-10-CM

## 2025-08-12 DIAGNOSIS — D50.8 OTHER IRON DEFICIENCY ANEMIA: ICD-10-CM

## 2025-08-12 DIAGNOSIS — Z85.01 PERSONAL HISTORY OF ESOPHAGEAL CANCER: ICD-10-CM

## 2025-08-12 DIAGNOSIS — D50.8 OTHER IRON DEFICIENCY ANEMIA: Primary | ICD-10-CM

## 2025-08-12 DIAGNOSIS — D50.9 IRON DEFICIENCY ANEMIA, UNSPECIFIED IRON DEFICIENCY ANEMIA TYPE: ICD-10-CM

## 2025-08-12 LAB
BASOPHILS # BLD AUTO: 0.04 10*3/MM3 (ref 0–0.2)
BASOPHILS NFR BLD AUTO: 0.8 % (ref 0–1.5)
DEPRECATED RDW RBC AUTO: 49 FL (ref 37–54)
EOSINOPHIL # BLD AUTO: 0.34 10*3/MM3 (ref 0–0.4)
EOSINOPHIL NFR BLD AUTO: 7 % (ref 0.3–6.2)
ERYTHROCYTE [DISTWIDTH] IN BLOOD BY AUTOMATED COUNT: 14.4 % (ref 12.3–15.4)
FERRITIN SERPL-MCNC: 298 NG/ML (ref 30–400)
HCT VFR BLD AUTO: 35 % (ref 37.5–51)
HGB BLD-MCNC: 11.1 G/DL (ref 13–17.7)
HGB RETIC QN AUTO: 30.5 PG (ref 29.8–36.1)
IMM GRANULOCYTES # BLD AUTO: 0.01 10*3/MM3 (ref 0–0.05)
IMM GRANULOCYTES NFR BLD AUTO: 0.2 % (ref 0–0.5)
IMM RETICS NFR: 6.1 % (ref 3–15.8)
IRON 24H UR-MRATE: 67 MCG/DL (ref 59–158)
IRON SATN MFR SERPL: 22 % (ref 20–50)
LYMPHOCYTES # BLD AUTO: 1.78 10*3/MM3 (ref 0.7–3.1)
LYMPHOCYTES NFR BLD AUTO: 36.6 % (ref 19.6–45.3)
MCH RBC QN AUTO: 29.1 PG (ref 26.6–33)
MCHC RBC AUTO-ENTMCNC: 31.7 G/DL (ref 31.5–35.7)
MCV RBC AUTO: 91.9 FL (ref 79–97)
MONOCYTES # BLD AUTO: 0.4 10*3/MM3 (ref 0.1–0.9)
MONOCYTES NFR BLD AUTO: 8.2 % (ref 5–12)
NEUTROPHILS NFR BLD AUTO: 2.3 10*3/MM3 (ref 1.7–7)
NEUTROPHILS NFR BLD AUTO: 47.2 % (ref 42.7–76)
NRBC BLD AUTO-RTO: 0 /100 WBC (ref 0–0.2)
PLATELET # BLD AUTO: 199 10*3/MM3 (ref 140–450)
PMV BLD AUTO: 9 FL (ref 6–12)
RBC # BLD AUTO: 3.81 10*6/MM3 (ref 4.14–5.8)
RETICS # AUTO: 0.02 10*6/MM3 (ref 0.02–0.13)
RETICS/RBC NFR AUTO: 0.58 % (ref 0.7–1.9)
TIBC SERPL-MCNC: 299 MCG/DL (ref 298–536)
TRANSFERRIN SERPL-MCNC: 201 MG/DL (ref 200–360)
VIT B12 BLD-MCNC: 322 PG/ML (ref 211–946)
WBC NRBC COR # BLD AUTO: 4.87 10*3/MM3 (ref 3.4–10.8)

## 2025-08-12 PROCEDURE — 83540 ASSAY OF IRON: CPT | Performed by: INTERNAL MEDICINE

## 2025-08-12 PROCEDURE — 84466 ASSAY OF TRANSFERRIN: CPT | Performed by: INTERNAL MEDICINE

## 2025-08-12 PROCEDURE — 85025 COMPLETE CBC W/AUTO DIFF WBC: CPT | Performed by: INTERNAL MEDICINE

## 2025-08-12 PROCEDURE — 82607 VITAMIN B-12: CPT | Performed by: INTERNAL MEDICINE

## 2025-08-12 PROCEDURE — 85046 RETICYTE/HGB CONCENTRATE: CPT | Performed by: INTERNAL MEDICINE

## 2025-08-12 PROCEDURE — 36415 COLL VENOUS BLD VENIPUNCTURE: CPT

## 2025-08-12 PROCEDURE — 82728 ASSAY OF FERRITIN: CPT | Performed by: INTERNAL MEDICINE

## 2025-08-13 ENCOUNTER — TREATMENT (OUTPATIENT)
Dept: PHYSICAL THERAPY | Facility: CLINIC | Age: 75
End: 2025-08-13
Payer: MEDICARE

## 2025-08-13 DIAGNOSIS — G89.29 CHRONIC BILATERAL LOW BACK PAIN WITHOUT SCIATICA: Primary | ICD-10-CM

## 2025-08-13 DIAGNOSIS — M54.50 CHRONIC BILATERAL LOW BACK PAIN WITHOUT SCIATICA: Primary | ICD-10-CM

## 2025-08-13 DIAGNOSIS — R26.89 BALANCE PROBLEM: ICD-10-CM

## 2025-08-13 DIAGNOSIS — R29.898 BILATERAL LEG WEAKNESS: ICD-10-CM

## 2025-08-13 DIAGNOSIS — R26.9 ABNORMALITY OF GAIT AND MOBILITY: ICD-10-CM

## 2025-08-15 ENCOUNTER — TREATMENT (OUTPATIENT)
Dept: PHYSICAL THERAPY | Facility: CLINIC | Age: 75
End: 2025-08-15
Payer: MEDICARE

## 2025-08-15 DIAGNOSIS — R26.89 BALANCE PROBLEM: ICD-10-CM

## 2025-08-15 DIAGNOSIS — R29.898 BILATERAL LEG WEAKNESS: ICD-10-CM

## 2025-08-15 DIAGNOSIS — G89.29 CHRONIC BILATERAL LOW BACK PAIN WITHOUT SCIATICA: Primary | ICD-10-CM

## 2025-08-15 DIAGNOSIS — R26.9 ABNORMALITY OF GAIT AND MOBILITY: ICD-10-CM

## 2025-08-15 DIAGNOSIS — M54.50 CHRONIC BILATERAL LOW BACK PAIN WITHOUT SCIATICA: Primary | ICD-10-CM

## 2025-08-19 ENCOUNTER — TREATMENT (OUTPATIENT)
Dept: PHYSICAL THERAPY | Facility: CLINIC | Age: 75
End: 2025-08-19
Payer: MEDICARE

## 2025-08-19 DIAGNOSIS — R29.898 BILATERAL LEG WEAKNESS: ICD-10-CM

## 2025-08-19 DIAGNOSIS — R26.89 BALANCE PROBLEM: ICD-10-CM

## 2025-08-19 DIAGNOSIS — M54.50 CHRONIC BILATERAL LOW BACK PAIN WITHOUT SCIATICA: Primary | ICD-10-CM

## 2025-08-19 DIAGNOSIS — R26.9 ABNORMALITY OF GAIT AND MOBILITY: ICD-10-CM

## 2025-08-19 DIAGNOSIS — G89.29 CHRONIC BILATERAL LOW BACK PAIN WITHOUT SCIATICA: Primary | ICD-10-CM

## 2025-08-19 PROCEDURE — 97112 NEUROMUSCULAR REEDUCATION: CPT | Performed by: PHYSICAL THERAPIST

## 2025-08-19 PROCEDURE — 97530 THERAPEUTIC ACTIVITIES: CPT | Performed by: PHYSICAL THERAPIST

## 2025-08-21 ENCOUNTER — OFFICE VISIT (OUTPATIENT)
Dept: INTERNAL MEDICINE | Age: 75
End: 2025-08-21
Payer: MEDICARE

## 2025-08-21 VITALS
SYSTOLIC BLOOD PRESSURE: 106 MMHG | WEIGHT: 176.6 LBS | TEMPERATURE: 97.6 F | HEIGHT: 77 IN | BODY MASS INDEX: 20.85 KG/M2 | OXYGEN SATURATION: 100 % | DIASTOLIC BLOOD PRESSURE: 64 MMHG | HEART RATE: 63 BPM

## 2025-08-21 DIAGNOSIS — Z23 NEED FOR HEPATITIS B VACCINATION: ICD-10-CM

## 2025-08-21 DIAGNOSIS — Z00.00 MEDICARE ANNUAL WELLNESS VISIT, SUBSEQUENT: Primary | ICD-10-CM

## 2025-08-22 ENCOUNTER — TREATMENT (OUTPATIENT)
Dept: PHYSICAL THERAPY | Facility: CLINIC | Age: 75
End: 2025-08-22
Payer: MEDICARE

## 2025-08-22 DIAGNOSIS — G89.29 CHRONIC BILATERAL LOW BACK PAIN WITHOUT SCIATICA: Primary | ICD-10-CM

## 2025-08-22 DIAGNOSIS — M54.50 CHRONIC BILATERAL LOW BACK PAIN WITHOUT SCIATICA: Primary | ICD-10-CM

## 2025-08-22 DIAGNOSIS — R29.898 BILATERAL LEG WEAKNESS: ICD-10-CM

## 2025-08-22 DIAGNOSIS — R26.9 ABNORMALITY OF GAIT AND MOBILITY: ICD-10-CM

## 2025-08-22 DIAGNOSIS — R26.89 BALANCE PROBLEM: ICD-10-CM

## 2025-08-22 PROCEDURE — 97112 NEUROMUSCULAR REEDUCATION: CPT | Performed by: PHYSICAL THERAPIST

## 2025-08-22 PROCEDURE — 97530 THERAPEUTIC ACTIVITIES: CPT | Performed by: PHYSICAL THERAPIST

## 2025-08-22 PROCEDURE — 97110 THERAPEUTIC EXERCISES: CPT | Performed by: PHYSICAL THERAPIST

## 2025-08-26 ENCOUNTER — TREATMENT (OUTPATIENT)
Dept: PHYSICAL THERAPY | Facility: CLINIC | Age: 75
End: 2025-08-26
Payer: MEDICARE

## 2025-08-26 DIAGNOSIS — R29.898 BILATERAL LEG WEAKNESS: ICD-10-CM

## 2025-08-26 DIAGNOSIS — R26.9 ABNORMALITY OF GAIT AND MOBILITY: ICD-10-CM

## 2025-08-26 DIAGNOSIS — G89.29 CHRONIC BILATERAL LOW BACK PAIN WITHOUT SCIATICA: Primary | ICD-10-CM

## 2025-08-26 DIAGNOSIS — M54.50 CHRONIC BILATERAL LOW BACK PAIN WITHOUT SCIATICA: Primary | ICD-10-CM

## 2025-08-26 DIAGNOSIS — R26.89 BALANCE PROBLEM: ICD-10-CM

## 2025-08-26 PROCEDURE — 97530 THERAPEUTIC ACTIVITIES: CPT | Performed by: PHYSICAL THERAPIST

## 2025-08-26 PROCEDURE — 97110 THERAPEUTIC EXERCISES: CPT | Performed by: PHYSICAL THERAPIST

## (undated) DEVICE — BLCK/BITE BLOX W/DENTL/RIM W/STRAP 54F

## (undated) DEVICE — ENCORE® LATEX ORTHO SIZE 7.5, STERILE LATEX POWDER-FREE SURGICAL GLOVE: Brand: ENCORE

## (undated) DEVICE — PK KN TOTL 40

## (undated) DEVICE — GLV SURG SENSICARE W/ALOE PF LF 7.5 STRL

## (undated) DEVICE — CANN O2 ETCO2 FITS ALL CONN CO2 SMPL A/ 7IN DISP LF

## (undated) DEVICE — THE TORRENT IRRIGATION SCOPE CONNECTOR IS USED WITH THE TORRENT IRRIGATION TUBING TO PROVIDE IRRIGATION FLUIDS SUCH AS STERILE WATER DURING GASTROINTESTINAL ENDOSCOPIC PROCEDURES WHEN USED IN CONJUNCTION WITH AN IRRIGATION PUMP (OR ELECTROSURGICAL UNIT).: Brand: TORRENT

## (undated) DEVICE — DISPOSABLE TOURNIQUET CUFF SINGLE BLADDER, SINGLE PORT AND QUICK CONNECT CONNECTOR: Brand: COLOR CUFF

## (undated) DEVICE — LN SMPL CO2 SHTRM SD STREAM W/M LUER

## (undated) DEVICE — ADAPT CLN BIOGUARD AIR/H2O DISP

## (undated) DEVICE — TUBING, SUCTION, 1/4" X 10', STRAIGHT: Brand: MEDLINE

## (undated) DEVICE — PK ORTHO MINOR TOWER 40

## (undated) DEVICE — KT ORCA ORCAPOD DISP STRL

## (undated) DEVICE — SHOE P/OP/CAST O/T M LG 11/13

## (undated) DEVICE — GLIDESHEATH SLENDER STAINLESS STEEL KIT: Brand: GLIDESHEATH SLENDER

## (undated) DEVICE — MAT FLR ABSORBENT LG 4FT 10 2.5FT

## (undated) DEVICE — PREP SOL POVIDONE/IODINE BT 4OZ

## (undated) DEVICE — FRCP BX RADJAW4 NDL 2.8 240CM LG OG BX40

## (undated) DEVICE — SINGLE-USE BIOPSY FORCEPS: Brand: RADIAL JAW 4

## (undated) DEVICE — 3M™ IOBAN™ 2 ANTIMICROBIAL INCISE DRAPE 6640EZ: Brand: IOBAN™ 2

## (undated) DEVICE — MEDI-VAC YANKAUER SUCTION HANDLE W/BULBOUS TIP: Brand: CARDINAL HEALTH

## (undated) DEVICE — STPLR SKIN VISISTAT WD 35CT

## (undated) DEVICE — BNDG GZ SOF-FORM CONFRM 2X75IN LF STRL

## (undated) DEVICE — SUT VIC 0 CT1 36IN J946H

## (undated) DEVICE — SENSR O2 OXIMAX FNGR A/ 18IN NONSTR

## (undated) DEVICE — CANNULA,ADULT,SOFT-TOUCH,7TUBE,SC: Brand: MEDLINE

## (undated) DEVICE — GLV SURG SENSICARE GREEN W/ALOE PF LF 7 STRL

## (undated) DEVICE — ELECTRD NDL EZ CLN MOD 2.75IN

## (undated) DEVICE — GOWN,NON-REINFORCED,SIRUS,SET IN SLV,XXL: Brand: MEDLINE

## (undated) DEVICE — DRSNG WND GZ CURAD OIL EMULSION 3X8IN LF STRL 1PK

## (undated) DEVICE — UNDERCAST PADDING: Brand: DEROYAL

## (undated) DEVICE — SKIN PREP TRAY W/CHG: Brand: MEDLINE INDUSTRIES, INC.

## (undated) DEVICE — GW DREAMWIRE STR SS .035IN 260CM

## (undated) DEVICE — NDL SPINE 22G 31/2IN BLK

## (undated) DEVICE — Device: Brand: DEFENDO AIR/WATER/SUCTION AND BIOPSY VALVE

## (undated) DEVICE — APPL CHLORAPREP W/TINT 26ML ORNG

## (undated) DEVICE — MSK PROC CURAPLEX O2 2/ADAPT 7FT

## (undated) DEVICE — LOU MINOR PROCEDURE: Brand: MEDLINE INDUSTRIES, INC.

## (undated) DEVICE — OCCLUSIVE GAUZE STRIP,3% BISMUTH TRIBROMOPHENATE IN PETROLATUM BLEND: Brand: XEROFORM

## (undated) DEVICE — PK CATH CARD 40

## (undated) DEVICE — BALL PIN JURGAN .062X3/8 DIA

## (undated) DEVICE — INTENDED FOR TISSUE SEPARATION, AND OTHER PROCEDURES THAT REQUIRE A SHARP SURGICAL BLADE TO PUNCTURE OR CUT.: Brand: BARD-PARKER ® CARBON RIB-BACK BLADES

## (undated) DEVICE — HARMONIC ACE 5MM DIAMETER SHEARS 23CM SHAFT LENGTH + ADAPTIVE TISSUE TECHNOLOGY FOR USE WITH GENERATOR G11: Brand: HARMONIC ACE

## (undated) DEVICE — BNDG ELAS ELITE V/CLOSE 6IN 5YD LF STRL

## (undated) DEVICE — ANTIBACTERIAL UNDYED BRAIDED (POLYGLACTIN 910), SYNTHETIC ABSORBABLE SUTURE: Brand: COATED VICRYL

## (undated) DEVICE — CANN NASL O2 SALTER STL

## (undated) DEVICE — PREMIUM WET SKIN PREP TRAY: Brand: MEDLINE INDUSTRIES, INC.

## (undated) DEVICE — APPL DURAPREP IODOPHOR APL 26ML

## (undated) DEVICE — BITEBLOCK OMNI BLOC

## (undated) DEVICE — KT MANIFLD CARDIAC

## (undated) DEVICE — GLV SURG SENSICARE MICRO PF LF 7 STRL

## (undated) DEVICE — GLV SURG BIOGEL LTX PF 8

## (undated) DEVICE — GLV SURG SENSICARE W/ALOE PF LF 8 STRL

## (undated) DEVICE — SUT VIC 1 CT1 36IN J947H

## (undated) DEVICE — SOL NACL 0.9PCT 100ML SGL

## (undated) DEVICE — CANN NASL CO2 TRULINK W/O2 A/

## (undated) DEVICE — BNDG ELAS CO-FLEX SLF ADHR 2IN 5YD LF STRL

## (undated) DEVICE — CATH DIAG IMPULSE FL3.5 5F 100CM

## (undated) DEVICE — CATH DIAG IMPULSE FR5 5F 100CM

## (undated) DEVICE — GLV SURG TRIUMPH CLASSIC PF LTX 8 STRL

## (undated) DEVICE — PENCL E/S ULTRAVAC TELESCP NOSE HOLSTR 10FT

## (undated) DEVICE — PRECISION THIN (9.0 X 0.38 X 25.0MM)

## (undated) DEVICE — DUAL CUT SAGITTAL BLADE

## (undated) DEVICE — CATH DIAG IMPULSE PIG 5F 100CM

## (undated) DEVICE — GLV SURG PREMIERPRO ORTHO LTX PF SZ7.5 BRN

## (undated) DEVICE — GW EMR FIX EXCHG J STD .035 3MM 260CM

## (undated) DEVICE — MICRO SAGITTAL BLADE OFFSET (5.5 X 0.38 X 29.6MM)

## (undated) DEVICE — SUT ETHLN 4/0 PS2 PLSTC 1667G

## (undated) DEVICE — TRAP FLD MINIVAC MEGADYNE 100ML

## (undated) DEVICE — WEBRIL* CAST PADDING: Brand: DEROYAL

## (undated) DEVICE — JELLY,LUBE,STERILE,FLIP TOP,TUBE,4-OZ: Brand: MEDLINE